# Patient Record
Sex: MALE | Race: BLACK OR AFRICAN AMERICAN | NOT HISPANIC OR LATINO | Employment: OTHER | URBAN - METROPOLITAN AREA
[De-identification: names, ages, dates, MRNs, and addresses within clinical notes are randomized per-mention and may not be internally consistent; named-entity substitution may affect disease eponyms.]

---

## 2017-01-04 ENCOUNTER — HOSPITAL ENCOUNTER (OUTPATIENT)
Dept: UROLOGY | Facility: HOSPITAL | Age: 67
Discharge: HOME | DRG: 699 | End: 2017-01-04
Attending: INTERNAL MEDICINE
Payer: MEDICARE

## 2017-01-04 VITALS
TEMPERATURE: 98 F | BODY MASS INDEX: 18.9 KG/M2 | WEIGHT: 135 LBS | RESPIRATION RATE: 18 BRPM | DIASTOLIC BLOOD PRESSURE: 80 MMHG | HEART RATE: 57 BPM | SYSTOLIC BLOOD PRESSURE: 118 MMHG | HEIGHT: 71 IN

## 2017-01-04 DIAGNOSIS — Z94.0 KIDNEY REPLACED BY TRANSPLANT: Primary | ICD-10-CM

## 2017-01-04 DIAGNOSIS — T86.10 COMPLICATIONS OF TRANSPLANTED KIDNEY: ICD-10-CM

## 2017-01-04 DIAGNOSIS — Z57.8 EMPLOYEE EXPOSURE TO BLOOD: Primary | ICD-10-CM

## 2017-01-04 DIAGNOSIS — Z72.89 OTHER PROBLEMS RELATED TO LIFESTYLE: ICD-10-CM

## 2017-01-04 DIAGNOSIS — R82.81 PYURIA: Primary | ICD-10-CM

## 2017-01-04 DIAGNOSIS — Z94.0 STATUS POST KIDNEY TRANSPLANT: ICD-10-CM

## 2017-01-04 DIAGNOSIS — R79.89 ELEVATED LFTS: ICD-10-CM

## 2017-01-04 LAB
BILIRUB SERPL-MCNC: NORMAL MG/DL
BLOOD URINE, POC: NORMAL
COLOR, POC UA: YELLOW
GLUCOSE UR QL STRIP: NORMAL
KETONES UR QL STRIP: NORMAL
LEUKOCYTE ESTERASE URINE, POC: NORMAL
NITRITE, POC UA: NORMAL
PH, POC UA: 5
PROTEIN, POC: NORMAL
SPECIFIC GRAVITY, POC UA: 1.01
UROBILINOGEN, POC UA: NORMAL

## 2017-01-04 PROCEDURE — 52310 CYSTOSCOPY AND TREATMENT: CPT | Mod: ,,, | Performed by: UROLOGY

## 2017-01-04 RX ORDER — TACROLIMUS 1 MG/1
CAPSULE ORAL
Qty: 450 CAPSULE | Refills: 11 | Status: ON HOLD | OUTPATIENT
Start: 2017-01-04 | End: 2017-01-16

## 2017-01-04 RX ORDER — LIDOCAINE HYDROCHLORIDE 20 MG/ML
10 JELLY TOPICAL
Status: COMPLETED | OUTPATIENT
Start: 2017-01-04 | End: 2017-01-04

## 2017-01-04 RX ORDER — SODIUM BICARBONATE 650 MG/1
1950 TABLET ORAL 2 TIMES DAILY
Qty: 180 TABLET | Refills: 11 | Status: SHIPPED | OUTPATIENT
Start: 2017-01-04 | End: 2017-09-25 | Stop reason: SDUPTHER

## 2017-01-04 RX ORDER — DOXYCYCLINE HYCLATE 100 MG
100 TABLET ORAL
Status: COMPLETED | OUTPATIENT
Start: 2017-01-04 | End: 2017-01-04

## 2017-01-04 RX ADMIN — Medication 100 MG: at 10:01

## 2017-01-04 RX ADMIN — LIDOCAINE HYDROCHLORIDE 10 ML: 20 JELLY TOPICAL at 09:01

## 2017-01-04 SDOH — SOCIAL DETERMINANTS OF HEALTH (SDOH): OCCUPATIONAL EXPOSURE TO OTHER RISK FACTORS: Z57.8

## 2017-01-04 NOTE — IP AVS SNAPSHOT
[address]  [place] [address]  Phone: [phone]           I have received a copy of my After Visit Summary and discharge instructions from [facility].    INSTRUCTIONS RECEIVED AND UNDERSTOOD BY:                     Patient/Patient Representative: ________________________________________________________________     Date/Time: ________________________________________________________________                     Instructions Given By: ________________________________________________________________     Date/Time: ________________________________________________________________

## 2017-01-04 NOTE — PROCEDURES
Procedure: Flexible cysto-uretheroscopy and stent removal   Pre Procedure Diagnosis:s/p kidney transplant   Post Procedure Diagnosis:same   Surgeon: Emily Guerin MD   Anesthesia: 2% uro-jet lidocaine jelly for local analgesia   Flexible cysto-urethroscopy was performed after consent was obtained. The risks and benefits were explained.   2% lidocaine urojet was used for local analgesia.   The genitalia was prepped and draped in the sterile fashion with betadine.   The flexible scope was advanced into the urethra and into the bladder. Slight stricture noted in urethra about 14F. Able to pass with scope with a little pressure.   The stent was removed without difficulty.   The patient tolerated the procedure well without complication.   They will follow up with transplant and f/u with Dr. Mann in 3 months.   Urine culture.

## 2017-01-04 NOTE — IP AVS SNAPSHOT
[facility]  [address] [facility] [address]  Phone: [phone]  Fax: [phone]                  Alin Burkett   2017  8:45 AM   Cysto and Stent Removal    Description:  Male : 1950   Provider:  [facility]   Department:  [facility]           Visit Information     Date & Time Provider Department    2017  8:45 AM [facility]      Recent Lab Values        10/20/2015 10/18/2016 2016 2016                  1:41 PM  3:12 PM  8:54 AM  8:26 AM        Color - - Yellow Yellow        Specific Gravity - - 1.015 1.010        pH - - 7.0 7.0        Nitrite - - Negative Negative        Ketones - - Negative Negative        Urobilinogen - - Negative Negative        PSA 0.32 0.41 - -        Comment for PSA at  1:41 PM on 10/20/2015:  PSA Expected levels:  Hormonal Therapy: <0.05 ng/ml  Prostatectomy: <0.01 ng/ml  Radiation Therapy: <1.00 ng/ml      Comment for PSA at  3:12 PM on 10/18/2016:  PSA Expected levels:  Hormonal Therapy: <0.05 ng/ml  Prostatectomy: <0.01 ng/ml  Radiation Therapy: <1.00 ng/ml        Procedures Performed This Visit     Procedure Authorizing Provider    Cystoscopy w/ stent removal Travis Zimmerman MD      Reason for Visit     Other           Diagnoses this Visit        Comments    Pyuria    -  Primary     Status post kidney transplant                To Do List           Your Scheduled Appointments     2017  8:00 AM CST   Fasting Lab with LAB, SLIDELL SAT   [facility] ([place]    [address]  [place] [address]   [phone]            2017  8:45 AM CST   Fasting Lab with LAB, SL[facility]LL SAT   [facility] ([place]    [address]  [place] [address]   [phone]            2017 11:00 AM CST   Established Patient with Chasidy Mcclain MD   [facility] ([facility] )    [address] [facility]  [address]   [phone]            Jan 23, 2017  8:30 AM CST   Fasting Lab with LAB, ENRIQUETA SAT   [facility] ([place])    [address] [place] [address] [phone]            Jan 23, 2017  8:30 AM CST   Urine with SPECIMEN, ENRIQUET[facility] [place] [facility] ([place])    [address]  [place] [address] [phone]              Goals (5 Years of Data)     None           Medications                ** Verify the list of medication(s) below is accurate and up to date. Carry this with you in case of emergency. If your medications have changed, please notify your healthcare provider.             Medication List      TAKE these medications        Additional Info                      cinacalcet 30 MG Tab   Commonly known as:  SENSIPAR   Quantity:  30 tablet   Refills:  11   Dose:  30 mg    Instructions:  Take 1 tablet (30 mg total) by mouth daily with breakfast.     Begin Date    AM    Noon    PM    Bedtime       famotidine 20 MG tablet   Commonly known as:  PEPCID   Quantity:  30 tablet   Refills:  11   Dose:  20 mg    Instructions:  Take 1 tablet (20 mg total) by mouth every evening.     Begin Date    AM    Noon    PM    Bedtime       k phos di & mono-sod phos mono 250 mg Tab   Commonly known as:  K-PHOS-NEUTRAL   Quantity:  180 tablet   Refills:  11   Dose:  500 mg   Indications:  Hypophosphatemia   Comments:  DOSE INCREASE    Instructions:  Take 2 tablets by mouth 3 (three) times daily.     Begin Date    AM    Noon    PM    Bedtime       levothyroxine 112 MCG tablet   Commonly known as:  SYNTHROID   Quantity:  30 tablet   Refills:  6   Dose:  112 mcg    Instructions:  Take 1 tablet (112 mcg total) by mouth once daily.     Begin Date    AM    Noon    PM    Bedtime       magnesium oxide 400 mg tablet   Commonly known as:  MAG-OX   Quantity:  30 tablet   Refills:  11   Dose:  400 mg    Instructions:  Take 1 tablet (400 mg total) by mouth once daily.     Begin Date    AM    Noon    PM     Bedtime       methylcellulose (laxative) 500 mg Tab   Commonly known as:  CITRUCEL   Quantity:  60 each   Refills:  6   Dose:  1 tablet    Instructions:  Take 1 tablet by mouth 2 (two) times daily.     Begin Date    AM    Noon    PM    Bedtime       metoprolol tartrate 25 MG tablet   Commonly known as:  LOPRESSOR   Quantity:  60 tablet   Refills:  11   Dose:  25 mg    Instructions:  Take 1 tablet (25 mg total) by mouth 2 (two) times daily.     Begin Date    AM    Noon    PM    Bedtime       mycophenolate 250 mg Cap   Commonly known as:  CELLCEPT   Quantity:  60 capsule   Refills:  11   Dose:  250 mg   Indications:  Prevention of Kidney Transplant Rejection   Comments:  DOSE DECREASE; Kidney Transplant z94.0; 11/26/16    Instructions:  Take 1 capsule (250 mg total) by mouth 2 (two) times daily. Z94.0/Kidney Transplant on 11/26/16     Begin Date    AM    Noon    PM    Bedtime       nifedipine 60 MG (OSM) 24 hr tablet   Commonly known as:  PROCARDIA-XL   Quantity:  60 tablet   Refills:  11   Dose:  60 mg    Instructions:  Take 1 tablet (60 mg total) by mouth 2 (two) times daily.     Begin Date    AM    Noon    PM    Bedtime       nystatin 100,000 unit/mL suspension   Commonly known as:  MYCOSTATIN   Quantity:  473 mL   Refills:  0   Dose:  [id number] Units    Instructions:  Take 5 mLs (500,000 Units total) by mouth 2 hours after meals and at bedtime. Stop: 12/26/16     Begin Date    AM    Noon    PM    Bedtime       ONE DAILY MULTIVITAMIN per tablet   Refills:  0   Dose:  1 tablet   Generic drug:  multivitamin    Instructions:  Take 1 tablet by mouth once daily.     Begin Date    AM    Noon    PM    Bedtime       oxycodone-acetaminophen 5-325 mg per tablet   Commonly known as:  PERCOCET   Quantity:  40 tablet   Refills:  0   Dose:  1 tablet    Instructions:  Take 1 tablet by mouth every 6 (six) hours as needed.     Begin Date    AM    Noon    PM    Bedtime       sodium bicarbonate 650 MG tablet   Quantity:  120 tablet  "  Refills:  11   Dose:  1300 mg   Comments:  DOSE DECREASE    Instructions:  Take 2 tablets (1,300 mg total) by mouth 2 (two) times daily.     Begin Date    AM    Noon    PM    Bedtime       sulfamethoxazole-trimethoprim 400-80mg 400-80 mg per tablet   Commonly known as:  BACTRIM,SEPTRA   Quantity:  30 tablet   Refills:  11   Dose:  1 tablet   Indications:  Opportunistic infection prophylaxis   Comments:  DOSE INCREASE; Kidney Transplant z94.0; 11/26/16    Instructions:  Take 1 tablet by mouth once daily. Stop: 11/26/17     Begin Date    AM    Noon    PM    Bedtime       tacrolimus 1 MG Cap   Commonly known as:  PROGRAF   Quantity:  420 capsule   Refills:  11   Dose:  7 mg   Indications:  Prevention of Kidney Transplant Rejection   Comments:  Z94.0/Kidney Transplant on 11/26/16    Instructions:  Take 7 capsules (7 mg total) by mouth every 12 (twelve) hours. Z94.0/Kidney Transplant on 11/26/16     Begin Date    AM    Noon    PM    Bedtime       valganciclovir 450 mg Tab   Commonly known as:  VALCYTE   Quantity:  30 tablet   Refills:  3   Dose:  450 mg   Comments:  DOSE INCREASE; Kidney Transplant z94.0; 11/26/16    Instructions:  Take 1 tablet (450 mg total) by mouth once daily. Stop: 2/24/17     Begin Date    AM    Noon    PM    Bedtime               Your Vitals Were     Temp Resp Height Weight BMI    97.8 °F (36.6 °C) (Oral) 18 5' 11" (1.803 m) 61.2 kg (135 lb) 18.83 kg/m2      Allergies as of 1/4/2017     No Known Allergies      Immunizations Administered on Date of Encounter - 1/4/2017     None      Orders Placed During Today's Visit      Normal Orders This Visit    CULTURE, URINE     Cystoscopy w/ stent removal     POCT urinalysis, dipstick or tablet reag       Maintenance Dialysis History     Start End Type Comments Center    6/16/2010  [facility] [place]            Current D[facility] Center Information     [facility] [place] [address] Phone #:  [phone]    Contact:  [facility] [place]  [address] Fax #:  " [phone]            Transplant Information        Txp Date Organ Coordinator Care Team    11/26/2016 Kidney Lisa Olea RN Referring Physician:  Lake Merchant MD   Surgeon:  Andrew Lopez Jr., MD         Instructions    What to Expect After a Cystoscopy with Stent Removal  For the next 24-48 hours, you may feel a mild burning when you urinate. This burning is normal and expected. Drink plenty of water to dilute the urine to help relieve the burning sensation. You may also see a small amount of blood in your urine after the procedure.    Unless you are already taking antibiotics, you may be given an antibiotic after the test to prevent infection.    Signs and Symptoms to Report  Call the [facility] at [phone] if you develop any of the following:  · Fever of 101 degrees or higher  · Chills or persistent bleeding  · Inability to urinate    After hours or on weekends, you may reach a urology resident on call at this number: [phone].       Advance Directives     An advance directive is a document which, in the event you are no longer able to make decisions for yourself, tells your healthcare team what kind of treatment you do or do not want to receive, or who you would like to make those decisions for you.  If you do not currently have an advance directive, [facility] encourages you to create one.  For more information call:  [phone]), [phone] ([phone]),  or log on to [email or web address].        [facility]     [facility] Care Line - 24/7 Assistance  Registered nurses in the [facility] provide clinical advisement, health education, appointment booking, and other advisory services.  Call for this free service at [phone].        Language Assistance Services     ATTENTION: Language assistance services are available, free of charge. Please call [phone].      ATENCIÓN: Si ramiro holley, aminah a neri disposición  servicios gratuitos de asistencia lingüística. Gilberto madison [phone].     [facility] Ý: N?u b?n nói Ti?ng Vi?t, có các d?ch v? h? tr? ngôn ng? mi?n phí dành cho b?n. G?i s? [phone].         [facility] complies with applicable Federal civil rights laws and does not discriminate on the basis of race, color, national origin, age, disability, or sex.

## 2017-01-04 NOTE — INTERVAL H&P NOTE
The patient has been examined and the H&P has been reviewed:    I concur with the findings and no changes have occurred since H&P was written.        There are no hospital problems to display for this patient.

## 2017-01-04 NOTE — PATIENT INSTRUCTIONS

## 2017-01-04 NOTE — H&P (VIEW-ONLY)
Transplant Surgery  Kidney Transplant Recipient Follow-up    Referring Physician: Lake Merchant  Current Nephrologist:     Subjective:     Chief Complaint: Alin Burkett is a 66 y.o. year old Black or  male who is status post Kidney transplant performed on 2016.    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  From a transplant perspective, he reports normal urination, no incisional pain, pain controlled with medications and no dysuria.  Alin is here for management of his immunosuppression medication.  Alin states that his immunosuppression is being well tolerated.  Hypertension is not present.    Review of Systems   Constitutional: Positive for unexpected weight change. Negative for activity change, appetite change, chills and fever.   HENT: Negative for congestion, nosebleeds, sinus pressure, sore throat and voice change.    Eyes: Negative for pain and visual disturbance.   Respiratory: Negative for cough and shortness of breath.    Cardiovascular: Negative for palpitations and leg swelling.   Gastrointestinal: Positive for diarrhea. Negative for abdominal distention, abdominal pain, nausea and vomiting.   Endocrine: Negative for cold intolerance and heat intolerance.   Genitourinary: Negative for dysuria, flank pain, frequency and hematuria.   Musculoskeletal: Negative for back pain and gait problem.   Skin: Negative for color change, pallor and wound.   Allergic/Immunologic: Negative for food allergies.   Neurological: Negative for dizziness, seizures, numbness and headaches.   Hematological: Negative for adenopathy.   Psychiatric/Behavioral: Negative for agitation, behavioral problems, hallucinations and sleep disturbance.        Objective:     Physical Exam   Constitutional: He is oriented to person, place, and time. He appears well-developed and well-nourished.   HENT:   Head: Normocephalic and atraumatic.   Eyes: EOM are normal. Pupils are equal, round, and reactive to light.   Neck: No JVD present.   Cardiovascular: Normal rate, regular rhythm and normal heart sounds.    Pulmonary/Chest: Effort normal and breath sounds normal. No stridor.   Abdominal: Soft. He exhibits no distension. There is no tenderness.   Incision c/d/i   Musculoskeletal: Normal range of motion. He exhibits no edema.   Neurological: He is alert and oriented to person, place, and time.   Skin: Skin is warm and dry.   Psychiatric: He has a normal mood and affect. His behavior is normal.     Lab Results   Component Value Date    CREATININE 1.3 12/27/2016    BUN 17 12/27/2016     Lab Results   Component Value Date    WBC 3.78 (L) 12/27/2016    HGB 12.1 (L) 12/27/2016    HCT 36.6 (L) 12/27/2016    HCT 26 (L) 11/26/2016     12/27/2016     Lab Results   Component Value Date    TACROLIMUS 5.3 12/27/2016         Assessment and Plan:        · S/P Kidney transplant.  · Chronic immunosuppressive medications for rejection prophylaxis subtherapeutic.  Plan: increase tacro to 7.  · Continue monitoring symptoms, labs and drug levels for drug-related toxicity and side effects.  · Renal hypertension at target.  · Diarrhea - imodium and fiber prescribed    Follow-up: Patient reminded to call with any health changes, since these can be early signs of significant complications.  Also, I advised the patient to be sure any new medications or changes of old medications are discussed with either a pharmacist, or physician knowledgeable with transplant to avoid rejection/drug toxicity related to significant drug interactions.    Andrew Lopez Jr, MD       [facility] Patient Status  Functional Status: 80% - Normal activity with effort: some symptoms of disease  Physical Capacity: No  Limitations

## 2017-01-04 NOTE — TELEPHONE ENCOUNTER
----- Message from Deb Gardner MD sent at 1/4/2017  3:33 PM CST -----  Prograf low at 6.8 --> if true 12 hour trough then increase Prograf from 7 mg BID to 8/7

## 2017-01-04 NOTE — TELEPHONE ENCOUNTER
Notified patient of Dr. Gardner's review of 1/3/17 lab results. Instructed patient to increase Prograf to 8/7 and increase sodium bicarbonate to 1950 mg BID. Informed patient that an abdominal ultrasound & transplant kidney ultrasound will be ordered due to elevated LFTs & elevated creatinine. Instructed patient to increase fluid intake to at least 2.5L water/fluids daily. Patient denies nausea, vomiting, diarrhea or any acute illness. Ultrasound and stat lab appointments given to patient.

## 2017-01-05 ENCOUNTER — HOSPITAL ENCOUNTER (INPATIENT)
Facility: HOSPITAL | Age: 67
LOS: 1 days | Discharge: HOME | DRG: 699 | End: 2017-01-06
Attending: TRANSPLANT SURGERY | Admitting: TRANSPLANT SURGERY
Payer: MEDICARE

## 2017-01-05 ENCOUNTER — HOSPITAL ENCOUNTER (OUTPATIENT)
Dept: RADIOLOGY | Facility: HOSPITAL | Age: 67
Discharge: HOME | DRG: 699 | End: 2017-01-05
Attending: INTERNAL MEDICINE
Payer: MEDICARE

## 2017-01-05 DIAGNOSIS — N17.9 AKI (ACUTE KIDNEY INJURY): ICD-10-CM

## 2017-01-05 DIAGNOSIS — Z29.89 PROPHYLACTIC IMMUNOTHERAPY: ICD-10-CM

## 2017-01-05 DIAGNOSIS — T86.10 COMPLICATIONS OF TRANSPLANTED KIDNEY: ICD-10-CM

## 2017-01-05 DIAGNOSIS — R79.89 ELEVATED LFTS: ICD-10-CM

## 2017-01-05 DIAGNOSIS — E87.20 ACIDOSIS: ICD-10-CM

## 2017-01-05 DIAGNOSIS — Z79.60 LONG-TERM USE OF IMMUNOSUPPRESSANT MEDICATION: ICD-10-CM

## 2017-01-05 DIAGNOSIS — Z94.0 S/P KIDNEY TRANSPLANT: ICD-10-CM

## 2017-01-05 LAB
ALBUMIN SERPL BCP-MCNC: 3.8 G/DL
ALP SERPL-CCNC: 110 U/L
ALT SERPL W/O P-5'-P-CCNC: 152 U/L
ANION GAP SERPL CALC-SCNC: 8 MMOL/L
AST SERPL-CCNC: 68 U/L
BACTERIA UR CULT: NO GROWTH
BASOPHILS # BLD AUTO: 0.03 K/UL
BASOPHILS NFR BLD: 0.8 %
BILIRUB SERPL-MCNC: 0.4 MG/DL
BUN SERPL-MCNC: 37 MG/DL
CALCIUM SERPL-MCNC: 8.8 MG/DL
CHLORIDE SERPL-SCNC: 111 MMOL/L
CO2 SERPL-SCNC: 22 MMOL/L
CREAT SERPL-MCNC: 1.6 MG/DL
DIFFERENTIAL METHOD: ABNORMAL
EOSINOPHIL # BLD AUTO: 0.2 K/UL
EOSINOPHIL NFR BLD: 4.7 %
ERYTHROCYTE [DISTWIDTH] IN BLOOD BY AUTOMATED COUNT: 16.6 %
EST. GFR  (AFRICAN AMERICAN): 51.5 ML/MIN/1.73 M^2
EST. GFR  (NON AFRICAN AMERICAN): 44.5 ML/MIN/1.73 M^2
GLUCOSE SERPL-MCNC: 88 MG/DL
HCT VFR BLD AUTO: 34.3 %
HGB BLD-MCNC: 11.6 G/DL
INR PPP: 0.9
LYMPHOCYTES # BLD AUTO: 0.5 K/UL
LYMPHOCYTES NFR BLD: 11.8 %
MAGNESIUM SERPL-MCNC: 2 MG/DL
MCH RBC QN AUTO: 30.6 PG
MCHC RBC AUTO-ENTMCNC: 33.8 %
MCV RBC AUTO: 91 FL
MONOCYTES # BLD AUTO: 0.2 K/UL
MONOCYTES NFR BLD: 5 %
NEUTROPHILS # BLD AUTO: 3 K/UL
NEUTROPHILS NFR BLD: 77.4 %
PHOSPHATE SERPL-MCNC: 2.8 MG/DL
PLATELET # BLD AUTO: 160 K/UL
PMV BLD AUTO: 10.1 FL
POCT GLUCOSE: 151 MG/DL (ref 70–110)
POTASSIUM SERPL-SCNC: 4.3 MMOL/L
PROT SERPL-MCNC: 7.3 G/DL
PROTHROMBIN TIME: 9.9 SEC
RBC # BLD AUTO: 3.79 M/UL
SODIUM SERPL-SCNC: 141 MMOL/L
WBC # BLD AUTO: 3.82 K/UL

## 2017-01-05 PROCEDURE — 63600175 PHARM REV CODE 636 W HCPCS: Performed by: PHYSICIAN ASSISTANT

## 2017-01-05 PROCEDURE — 83735 ASSAY OF MAGNESIUM: CPT

## 2017-01-05 PROCEDURE — 86977 RBC SERUM PRETX INCUBJ/INHIB: CPT

## 2017-01-05 PROCEDURE — 36415 COLL VENOUS BLD VENIPUNCTURE: CPT

## 2017-01-05 PROCEDURE — 86832 HLA CLASS I HIGH DEFIN QUAL: CPT | Mod: 91

## 2017-01-05 PROCEDURE — 25000003 PHARM REV CODE 250: Performed by: PHYSICIAN ASSISTANT

## 2017-01-05 PROCEDURE — 85025 COMPLETE CBC W/AUTO DIFF WBC: CPT

## 2017-01-05 PROCEDURE — 80053 COMPREHEN METABOLIC PANEL: CPT

## 2017-01-05 PROCEDURE — 20600001 HC STEP DOWN PRIVATE ROOM

## 2017-01-05 PROCEDURE — 84100 ASSAY OF PHOSPHORUS: CPT

## 2017-01-05 PROCEDURE — 86833 HLA CLASS II HIGH DEFIN QUAL: CPT

## 2017-01-05 PROCEDURE — 76776 US EXAM K TRANSPL W/DOPPLER: CPT | Mod: 26,,, | Performed by: RADIOLOGY

## 2017-01-05 PROCEDURE — 76700 US EXAM ABDOM COMPLETE: CPT | Mod: 26,,, | Performed by: RADIOLOGY

## 2017-01-05 PROCEDURE — 85610 PROTHROMBIN TIME: CPT

## 2017-01-05 PROCEDURE — 99223 1ST HOSP IP/OBS HIGH 75: CPT | Mod: 24,,, | Performed by: PHYSICIAN ASSISTANT

## 2017-01-05 RX ORDER — LEVOTHYROXINE SODIUM 112 UG/1
112 TABLET ORAL DAILY
Status: DISCONTINUED | OUTPATIENT
Start: 2017-01-06 | End: 2017-01-06 | Stop reason: HOSPADM

## 2017-01-05 RX ORDER — METOPROLOL TARTRATE 25 MG/1
25 TABLET, FILM COATED ORAL 2 TIMES DAILY
Status: DISCONTINUED | OUTPATIENT
Start: 2017-01-05 | End: 2017-01-06 | Stop reason: HOSPADM

## 2017-01-05 RX ORDER — HEPARIN SODIUM 5000 [USP'U]/ML
5000 INJECTION, SOLUTION INTRAVENOUS; SUBCUTANEOUS EVERY 8 HOURS
Status: DISCONTINUED | OUTPATIENT
Start: 2017-01-05 | End: 2017-01-06 | Stop reason: HOSPADM

## 2017-01-05 RX ORDER — OXYCODONE AND ACETAMINOPHEN 5; 325 MG/1; MG/1
1 TABLET ORAL EVERY 8 HOURS PRN
Status: DISCONTINUED | OUTPATIENT
Start: 2017-01-05 | End: 2017-01-06 | Stop reason: HOSPADM

## 2017-01-05 RX ORDER — IBUPROFEN 200 MG
16 TABLET ORAL
Status: DISCONTINUED | OUTPATIENT
Start: 2017-01-05 | End: 2017-01-06 | Stop reason: HOSPADM

## 2017-01-05 RX ORDER — NYSTATIN 100000 [USP'U]/ML
500000 SUSPENSION ORAL
Status: DISCONTINUED | OUTPATIENT
Start: 2017-01-05 | End: 2017-01-06 | Stop reason: HOSPADM

## 2017-01-05 RX ORDER — IBUPROFEN 200 MG
24 TABLET ORAL
Status: DISCONTINUED | OUTPATIENT
Start: 2017-01-05 | End: 2017-01-06 | Stop reason: HOSPADM

## 2017-01-05 RX ORDER — NIFEDIPINE 60 MG/1
60 TABLET, EXTENDED RELEASE ORAL 2 TIMES DAILY
Status: DISCONTINUED | OUTPATIENT
Start: 2017-01-05 | End: 2017-01-06 | Stop reason: HOSPADM

## 2017-01-05 RX ORDER — SULFAMETHOXAZOLE AND TRIMETHOPRIM 400; 80 MG/1; MG/1
1 TABLET ORAL DAILY
Status: DISCONTINUED | OUTPATIENT
Start: 2017-01-06 | End: 2017-01-06 | Stop reason: HOSPADM

## 2017-01-05 RX ORDER — CINACALCET 30 MG/1
30 TABLET, FILM COATED ORAL
Status: DISCONTINUED | OUTPATIENT
Start: 2017-01-06 | End: 2017-01-06 | Stop reason: HOSPADM

## 2017-01-05 RX ORDER — GLUCAGON 1 MG
1 KIT INJECTION
Status: DISCONTINUED | OUTPATIENT
Start: 2017-01-05 | End: 2017-01-06 | Stop reason: HOSPADM

## 2017-01-05 RX ORDER — SODIUM BICARBONATE 650 MG/1
1950 TABLET ORAL 2 TIMES DAILY
Status: DISCONTINUED | OUTPATIENT
Start: 2017-01-05 | End: 2017-01-06 | Stop reason: HOSPADM

## 2017-01-05 RX ORDER — FAMOTIDINE 20 MG/1
20 TABLET, FILM COATED ORAL NIGHTLY
Status: DISCONTINUED | OUTPATIENT
Start: 2017-01-05 | End: 2017-01-06 | Stop reason: HOSPADM

## 2017-01-05 RX ORDER — VALGANCICLOVIR 450 MG/1
450 TABLET, FILM COATED ORAL DAILY
Status: DISCONTINUED | OUTPATIENT
Start: 2017-01-06 | End: 2017-01-06 | Stop reason: HOSPADM

## 2017-01-05 RX ORDER — INSULIN ASPART 100 [IU]/ML
0-5 INJECTION, SOLUTION INTRAVENOUS; SUBCUTANEOUS
Status: DISCONTINUED | OUTPATIENT
Start: 2017-01-05 | End: 2017-01-06 | Stop reason: HOSPADM

## 2017-01-05 RX ORDER — SODIUM CHLORIDE 0.9 % (FLUSH) 0.9 %
3 SYRINGE (ML) INJECTION EVERY 8 HOURS
Status: DISCONTINUED | OUTPATIENT
Start: 2017-01-05 | End: 2017-01-06 | Stop reason: HOSPADM

## 2017-01-05 RX ORDER — ONDANSETRON 8 MG/1
8 TABLET, ORALLY DISINTEGRATING ORAL EVERY 8 HOURS PRN
Status: DISCONTINUED | OUTPATIENT
Start: 2017-01-05 | End: 2017-01-06 | Stop reason: HOSPADM

## 2017-01-05 RX ORDER — MYCOPHENOLATE MOFETIL 250 MG/1
250 CAPSULE ORAL 2 TIMES DAILY
Status: DISCONTINUED | OUTPATIENT
Start: 2017-01-05 | End: 2017-01-06

## 2017-01-05 RX ADMIN — DIBASIC SODIUM PHOSPHATE, MONOBASIC POTASSIUM PHOSPHATE AND MONOBASIC SODIUM PHOSPHATE 2 TABLET: 852; 155; 130 TABLET ORAL at 09:01

## 2017-01-05 RX ADMIN — HEPARIN SODIUM 5000 UNITS: 5000 INJECTION, SOLUTION INTRAVENOUS; SUBCUTANEOUS at 09:01

## 2017-01-05 RX ADMIN — NIFEDIPINE 60 MG: 60 TABLET, FILM COATED, EXTENDED RELEASE ORAL at 09:01

## 2017-01-05 RX ADMIN — FAMOTIDINE 20 MG: 20 TABLET, FILM COATED ORAL at 09:01

## 2017-01-05 RX ADMIN — MYCOPHENOLATE MOFETIL 250 MG: 250 CAPSULE ORAL at 09:01

## 2017-01-05 RX ADMIN — TACROLIMUS 7 MG: 5 CAPSULE, GELATIN COATED ORAL at 05:01

## 2017-01-05 RX ADMIN — SODIUM BICARBONATE 650 MG TABLET 1950 MG: at 09:01

## 2017-01-05 RX ADMIN — NYSTATIN 500000 UNITS: 500000 SUSPENSION ORAL at 09:01

## 2017-01-05 RX ADMIN — METOPROLOL TARTRATE 25 MG: 25 TABLET ORAL at 09:01

## 2017-01-05 RX ADMIN — DEXTROSE: 50 INJECTION, SOLUTION INTRAVENOUS at 07:01

## 2017-01-05 NOTE — TELEPHONE ENCOUNTER
Notified patient of Dr. Gardner's review of 1/4/17 lab results. Informed patient that he will need to be admitted to [facility] to be evaluated for elevated creatinine 2.6. Informed patient that he will need to report to admit office on 1st floor of hospital. Patient verbalized understanding.  MATTHEW Delarosa was informed of patient needing to be admitted today (Dr. Gardner's recommendations were given).

## 2017-01-05 NOTE — TELEPHONE ENCOUNTER
----- Message from Deb Gardner MD sent at 1/5/2017 12:33 PM CST -----  Cr even worse. Please direct patient for admission. He should have renal allograft biopsy, get DSAs, initiate empiric SM pulse.

## 2017-01-05 NOTE — IP AVS SNAPSHOT
[facility]  [address]  [facility] [address]  Phone: [phone]           Patient Discharge Instructions     Our goal is to set you up for success. This packet includes information on your condition, medications, and your home care. It will help you to care for yourself so you don't get sicker and need to go back to the hospital.     Please ask your nurse if you have any questions.        There are many details to remember when preparing to leave the hospital. Here is what you will need to do:    1. Take your medicine. If you are prescribed medications, review your Medication List in the following pages. You may have new medications to  at the pharmacy and others that you'll need to stop taking. Review the instructions for how and when to take your medications. Talk with your doctor or nurses if you are unsure of what to do.     2. Go to your follow-up appointments. Specific follow-up information is listed in the following pages. Your may be contacted by a transition nurse or clinical provider about future appointments. Be sure we have all of the phone numbers to reach you, if needed. Please contact your provider's office if you are unable to make an appointment.     3. Watch for warning signs. Your doctor or nurse will give you detailed warning signs to watch for and when to call for assistance. These instructions may also include educational information about your condition. If you experience any of warning signs to your health, call your doctor.               [facility]  Unless otherwise directed by your provider, please contact [facility], our nurse care line that is available for 24/7 assistance.     [phone] (toll-free)    Registered nurses in the [facility] provide clinical advisement, health education, appointment booking, and other advisory services.                    ** Verify the list of medication(s) below is accurate and up  to date. Carry this with you in case of emergency. If your medications have changed, please notify your healthcare provider.             Medication List      CHANGE how you take these medications        Additional Info                      mycophenolate 250 mg Cap   Commonly known as:  CELLCEPT   Quantity:  60 capsule   Refills:  11   Dose:  500 mg   Indications:  Prevention of Kidney Transplant Rejection   What changed:  how much to take   Comments:  DOSE DECREASE; Kidney Transplant z94.0; 11/26/16    Last time this was given:  250 mg on 1/6/2017  8:37 AM   Instructions:  Take 2 capsules (500 mg total) by mouth 2 (two) times daily. Z94.0/Kidney Transplant on 11/26/16     Begin Date    AM    Noon    PM    Bedtime         CONTINUE taking these medications        Additional Info                      cinacalcet 30 MG Tab   Commonly known as:  SENSIPAR   Quantity:  30 tablet   Refills:  11   Dose:  30 mg    Last time this was given:  30 mg on 1/6/2017  8:38 AM   Instructions:  Take 1 tablet (30 mg total) by mouth daily with breakfast.     Begin Date    AM    Noon    PM    Bedtime       famotidine 20 MG tablet   Commonly known as:  PEPCID   Quantity:  30 tablet   Refills:  11   Dose:  20 mg    Last time this was given:  20 mg on 1/5/2017  9:17 PM   Instructions:  Take 1 tablet (20 mg total) by mouth every evening.     Begin Date    AM    Noon    PM    Bedtime       k phos di & mono-sod phos mono 250 mg Tab   Commonly known as:  K-PHOS-NEUTRAL   Quantity:  180 tablet   Refills:  11   Dose:  500 mg   Indications:  Hypophosphatemia   Comments:  DOSE INCREASE    Last time this was given:  2 tablets on 1/6/2017  5:26 AM   Instructions:  Take 2 tablets by mouth 3 (three) times daily.     Begin Date    AM    Noon    PM    Bedtime       levothyroxine 112 MCG tablet   Commonly known as:  SYNTHROID   Quantity:  30 tablet   Refills:  6   Dose:  112 mcg    Last time this was given:  112 mcg on 1/6/2017  8:37 AM   Instructions:  Take  1 tablet (112 mcg total) by mouth once daily.     Begin Date    AM    Noon    PM    Bedtime       magnesium oxide 400 mg tablet   Commonly known as:  MAG-OX   Quantity:  30 tablet   Refills:  11   Dose:  400 mg    Instructions:  Take 1 tablet (400 mg total) by mouth once daily.     Begin Date    AM    Noon    PM    Bedtime       metoprolol tartrate 25 MG tablet   Commonly known as:  LOPRESSOR   Quantity:  60 tablet   Refills:  11   Dose:  25 mg    Last time this was given:  25 mg on 1/6/2017  8:39 AM   Instructions:  Take 1 tablet (25 mg total) by mouth 2 (two) times daily.     Begin Date    AM    Noon    PM    Bedtime       nifedipine 60 MG (OSM) 24 hr tablet   Commonly known as:  PROCARDIA-XL   Quantity:  60 tablet   Refills:  11   Dose:  60 mg    Last time this was given:  60 mg on 1/5/2017  9:17 PM   Instructions:  Take 1 tablet (60 mg total) by mouth 2 (two) times daily.     Begin Date    AM    Noon    PM    Bedtime       ONE DAILY MULTIVITAMIN per tablet   Refills:  0   Dose:  1 tablet   Generic drug:  multivitamin    Instructions:  Take 1 tablet by mouth once daily.     Begin Date    AM    Noon    PM    Bedtime       oxycodone-acetaminophen 5-325 mg per tablet   Commonly known as:  PERCOCET   Quantity:  40 tablet   Refills:  0   Dose:  1 tablet    Instructions:  Take 1 tablet by mouth every 6 (six) hours as needed.     Begin Date    AM    Noon    PM    Bedtime       sodium bicarbonate 650 MG tablet   Quantity:  180 tablet   Refills:  11   Dose:  1950 mg   Comments:  DOSE INCREASE    Last time this was given:  1,950 mg on 1/6/2017  8:37 AM   Instructions:  Take 3 tablets (1,950 mg total) by mouth 2 (two) times daily.     Begin Date    AM    Noon    PM    Bedtime       sulfamethoxazole-trimethoprim 400-80mg 400-80 mg per tablet   Commonly known as:  BACTRIM,SEPTRA   Quantity:  30 tablet   Refills:  11   Dose:  1 tablet   Indications:  Opportunistic infection prophylaxis   Comments:  DOSE INCREASE; Kidney  Transplant z94.0; 11/26/16    Last time this was given:  1 tablet on 1/6/2017  8:39 AM   Instructions:  Take 1 tablet by mouth once daily. Stop: 11/26/17     Begin Date    AM    Noon    PM    Bedtime       tacrolimus 1 MG Cap   Commonly known as:  PROGRAF   Quantity:  450 capsule   Refills:  11   Indications:  Prevention of Kidney Transplant Rejection   Comments:  DOSE INCREASE; Z94.0/Kidney Transplant on 11/26/16    Last time this was given:  8 mg on 1/6/2017  8:38 AM   Instructions:  Take 8 capsules (8mg total) in AM & 7 capsules (7mg total) in PM by mouth daily. Z94.0/Kidney Transplant on 11/26/16     Begin Date    AM    Noon    PM    Bedtime       valganciclovir 450 mg Tab   Commonly known as:  VALCYTE   Quantity:  30 tablet   Refills:  3   Dose:  450 mg   Comments:  DOSE INCREASE; Kidney Transplant z94.0; 11/26/16    Last time this was given:  450 mg on 1/6/2017  8:37 AM   Instructions:  Take 1 tablet (450 mg total) by mouth once daily. Stop: 2/24/17     Begin Date    AM    Noon    PM    Bedtime            Where to Get Your Medications      Information about where to get these medications is not yet available     ! Ask your nurse or doctor about these medications     mycophenolate 250 mg Cap                  Please bring to all follow up appointments:    1. A copy of your discharge instructions.  2. All medicines you are currently taking in their original bottles.  3. Identification and insurance card.    Please arrive 15 minutes ahead of scheduled appointment time.    Please call 24 hours in advance if you must reschedule your appointment and/or time.        Your Scheduled Appointments     Jan 09, 2017  8:00 AM CST   Fasting Lab with [facility] ([place])    [address]  [place] [address]   [phone]            Jan 16, 2017  8:45 AM CST   Fasting Lab with [facility] - Lab ([place])    [address]  [place] [address]   [phone]             Jan 16, 2017 11:00 AM CST   Established Patient with MD Brad Machado [facility]- Transplant ([place] )    [address]  [facility] [id number] [phone]            Jan 23, 2017  8:30 AM CST   Fasting Lab with [facility], [facility] - Lab ([place])    [address]  [place] [address] [phone]            Jan 23, 2017  8:30 AM CST   Urine with SPECIMEN, SLIDEMILVIA   [place] [facility] - Lab ([place])    [address]  [place] [address] [phone]              Follow-up Information     Follow up with as scheduled per transplant coordinator.    Why:  As needed, If symptoms worsen as scheduled per transplant coordinator        Discharge Instructions     Future Orders    Call MD for:  difficulty breathing or increased cough     Call MD for:  increased confusion or weakness     Call MD for:  persistent dizziness, light-headedness, or visual disturbances     Call MD for:  persistent nausea and vomiting or diarrhea     Call MD for:  redness, tenderness, or signs of infection (pain, swelling, redness, odor or green/yellow discharge around incision site)     Call MD for:  severe persistent headache     Call MD for:  severe uncontrolled pain     Call MD for:  temperature >100.4     Call MD for:  worsening rash     Call MD for:     Comments:    For any concerning signs or symptoms    Diet general     Questions:    Total calories:      Fat restriction, if any:      Protein restriction, if any:      Na restriction, if any:      Fluid restriction:      Additional restrictions:      Lifting restrictions     Comments:    Do not lift >10 pounds for 6 weeks    No dressing needed         Primary Diagnosis     Your primary diagnosis was:  Acute Nontraumatic Kidney Injury      Admission Information     Date & Time Provider Department CSN    1/5/2017  4:46 PM Andrew Lopez Jr., MD [facility] [id number]      Care Providers     Provider Role Specialty Primary  "office phone    Andrew Lopez Jr., MD Attending Provider Transplant [phone]    Reinier Roche MD Physician  Transplant [phone]    Lake Kay MD Physician  Transplant [phone]    Davida Roberto MD Physician  Transplant [phone]    Goldy Cruz MD Physician  Transplant [phone]    Naeem Cordova MD Physician  Transplant [phone]    Kyle Rouse Jr., MD Physician  Transplant [phone]    Vincenzo Austin MD Physician  Transplant [phone]    Andrew Lopez Jr., MD Physician  Transplant [phone]    Jovanna Omalley MD Consulting Physician  Nephrology [phone]    Travis Zimmerman MD Consulting Physician  Nephrology [phone]    Deb Gardner MD Consulting Physician  Transplant [phone]      Your Vitals Were     BP Pulse Temp Resp Height Weight    123/83 (BP Location: Right arm, Patient Position: Sitting, BP Method: Automatic) 76 98.3 °F (36.8 °C) (Oral) 18 5' 11" (1.803 m) 60 kg (132 lb 3.2 oz)    SpO2 BMI             99% 18.44 kg/m2         Recent Lab Values        2/8/2012                           5:05 AM           A1C 5.2                       Pending Labs     Order Current Status    HLA Donor Specific Antibodies In process    Urine culture In process      Allergies as of 1/6/2017     No Known Allergies      Advance Directives     An advance directive is a document which, in the event you are no longer able to make decisions for yourself, tells your healthcare team what kind of treatment you do or do not want to receive, or who you would like to make those decisions for you.  If you do not currently have an advance directive, [facility] encourages you to create one.  For more information call:  [phone] ([phone]), [phone] ([phone]),  or log on to [email or web address].        Smoking Cessation     If you would like to quit smoking:   You may be eligible for free services if you are a [place] resident and started " smoking cigarettes before September 1, 1988.  Call the Smoking Cessation [employer] (SCT) toll free at (096) 318-5342 or (700) 584-3412.   Call 1-800-QUIT-NOW if you do not meet the above criteria.            Language Assistance Services     ATTENTION: Language assistance services are available, free of charge. Please call [phone].      ATENCIÓN: Si habla español, tiene a neri disposición servicios gratuitos de asistencia lingüística. Llame al [phone].     CHÚ Ý: N?u b?n nói Ti?ng Vi?t, có các d?ch v? h? tr? ngôn ng? mi?n phí dành cho b?n. G?i s? [phone].        Chronic Kindey Disease Education              [facility] complies with applicable Federal civil rights laws and does not discriminate on the basis of race, color, national origin, age, disability, or sex.

## 2017-01-05 NOTE — IP AVS SNAPSHOT
[address]  [place] [address]  Phone: [phone]           I have received a copy of my After Visit Summary and discharge instructions from [facility].    INSTRUCTIONS RECEIVED AND UNDERSTOOD BY:                     Patient/Patient Representative: ________________________________________________________________     Date/Time: ________________________________________________________________                     Instructions Given By: ________________________________________________________________     Date/Time: ________________________________________________________________

## 2017-01-06 VITALS
OXYGEN SATURATION: 100 % | BODY MASS INDEX: 18.51 KG/M2 | HEIGHT: 71 IN | TEMPERATURE: 98 F | SYSTOLIC BLOOD PRESSURE: 115 MMHG | HEART RATE: 70 BPM | DIASTOLIC BLOOD PRESSURE: 78 MMHG | RESPIRATION RATE: 18 BRPM | WEIGHT: 132.19 LBS

## 2017-01-06 DIAGNOSIS — R74.8 ELEVATED LIVER ENZYMES: Primary | ICD-10-CM

## 2017-01-06 PROBLEM — N17.9 AKI (ACUTE KIDNEY INJURY): Status: ACTIVE | Noted: 2017-01-06

## 2017-01-06 LAB
ALBUMIN SERPL BCP-MCNC: 3.6 G/DL
ALP SERPL-CCNC: 101 U/L
ALT SERPL W/O P-5'-P-CCNC: 128 U/L
ANION GAP SERPL CALC-SCNC: 10 MMOL/L
AST SERPL-CCNC: 47 U/L
BASOPHILS # BLD AUTO: 0.01 K/UL
BASOPHILS NFR BLD: 0.2 %
BILIRUB SERPL-MCNC: 0.4 MG/DL
BILIRUB UR QL STRIP: NEGATIVE
BUN SERPL-MCNC: 32 MG/DL
CALCIUM SERPL-MCNC: 9.1 MG/DL
CHLORIDE SERPL-SCNC: 110 MMOL/L
CLARITY UR REFRACT.AUTO: CLEAR
CO2 SERPL-SCNC: 19 MMOL/L
COLOR UR AUTO: YELLOW
CREAT SERPL-MCNC: 1.5 MG/DL
DIFFERENTIAL METHOD: ABNORMAL
EOSINOPHIL # BLD AUTO: 0 K/UL
EOSINOPHIL NFR BLD: 0.5 %
ERYTHROCYTE [DISTWIDTH] IN BLOOD BY AUTOMATED COUNT: 16.4 %
EST. GFR  (AFRICAN AMERICAN): 55.7 ML/MIN/1.73 M^2
EST. GFR  (NON AFRICAN AMERICAN): 48.2 ML/MIN/1.73 M^2
GLUCOSE SERPL-MCNC: 133 MG/DL
GLUCOSE UR QL STRIP: NEGATIVE
HCT VFR BLD AUTO: 34.6 %
HGB BLD-MCNC: 11.8 G/DL
HGB UR QL STRIP: NEGATIVE
KETONES UR QL STRIP: NEGATIVE
LEUKOCYTE ESTERASE UR QL STRIP: NEGATIVE
LYMPHOCYTES # BLD AUTO: 0.4 K/UL
LYMPHOCYTES NFR BLD: 10.9 %
MAGNESIUM SERPL-MCNC: 2.1 MG/DL
MCH RBC QN AUTO: 30.9 PG
MCHC RBC AUTO-ENTMCNC: 34.1 %
MCV RBC AUTO: 91 FL
MONOCYTES # BLD AUTO: 0.1 K/UL
MONOCYTES NFR BLD: 1.7 %
NEUTROPHILS # BLD AUTO: 3.5 K/UL
NEUTROPHILS NFR BLD: 86 %
NITRITE UR QL STRIP: NEGATIVE
PH UR STRIP: 7 [PH] (ref 5–8)
PHOSPHATE SERPL-MCNC: 2.7 MG/DL
PLATELET # BLD AUTO: 157 K/UL
PMV BLD AUTO: 10.5 FL
POCT GLUCOSE: 131 MG/DL (ref 70–110)
POTASSIUM SERPL-SCNC: 4.7 MMOL/L
PROT SERPL-MCNC: 7.1 G/DL
PROT UR QL STRIP: NEGATIVE
RBC # BLD AUTO: 3.82 M/UL
SODIUM SERPL-SCNC: 139 MMOL/L
SP GR UR STRIP: 1.01 (ref 1–1.03)
TACROLIMUS BLD-MCNC: 8 NG/ML
URN SPEC COLLECT METH UR: NORMAL
UROBILINOGEN UR STRIP-ACNC: NEGATIVE EU/DL
WBC # BLD AUTO: 4.03 K/UL

## 2017-01-06 PROCEDURE — 80197 ASSAY OF TACROLIMUS: CPT

## 2017-01-06 PROCEDURE — 63600175 PHARM REV CODE 636 W HCPCS: Performed by: RADIOLOGY

## 2017-01-06 PROCEDURE — 85025 COMPLETE CBC W/AUTO DIFF WBC: CPT

## 2017-01-06 PROCEDURE — 84100 ASSAY OF PHOSPHORUS: CPT

## 2017-01-06 PROCEDURE — 63600175 PHARM REV CODE 636 W HCPCS: Performed by: PHYSICIAN ASSISTANT

## 2017-01-06 PROCEDURE — 25000003 PHARM REV CODE 250: Performed by: RADIOLOGY

## 2017-01-06 PROCEDURE — 80053 COMPREHEN METABOLIC PANEL: CPT

## 2017-01-06 PROCEDURE — 83735 ASSAY OF MAGNESIUM: CPT

## 2017-01-06 PROCEDURE — 36415 COLL VENOUS BLD VENIPUNCTURE: CPT

## 2017-01-06 PROCEDURE — 81003 URINALYSIS AUTO W/O SCOPE: CPT

## 2017-01-06 PROCEDURE — 99238 HOSP IP/OBS DSCHRG MGMT 30/<: CPT | Mod: 24,,, | Performed by: PHYSICIAN ASSISTANT

## 2017-01-06 PROCEDURE — 87086 URINE CULTURE/COLONY COUNT: CPT

## 2017-01-06 PROCEDURE — 0TB03ZX EXCISION OF RIGHT KIDNEY, PERCUTANEOUS APPROACH, DIAGNOSTIC: ICD-10-PCS | Performed by: RADIOLOGY

## 2017-01-06 PROCEDURE — 25000003 PHARM REV CODE 250: Performed by: PHYSICIAN ASSISTANT

## 2017-01-06 RX ORDER — MYCOPHENOLATE MOFETIL 250 MG/1
500 CAPSULE ORAL 2 TIMES DAILY
Status: DISCONTINUED | OUTPATIENT
Start: 2017-01-06 | End: 2017-01-06 | Stop reason: HOSPADM

## 2017-01-06 RX ORDER — MYCOPHENOLATE MOFETIL 250 MG/1
500 CAPSULE ORAL 2 TIMES DAILY
Qty: 60 CAPSULE | Refills: 11 | Status: ON HOLD
Start: 2017-01-06 | End: 2017-01-16

## 2017-01-06 RX ADMIN — SULFAMETHOXAZOLE AND TRIMETHOPRIM 1 TABLET: 400; 80 TABLET ORAL at 08:01

## 2017-01-06 RX ADMIN — DESMOPRESSIN ACETATE 9 MCG: 4 INJECTION INTRAVENOUS at 09:01

## 2017-01-06 RX ADMIN — VALGANCICLOVIR 450 MG: 450 TABLET, FILM COATED ORAL at 08:01

## 2017-01-06 RX ADMIN — DIBASIC SODIUM PHOSPHATE, MONOBASIC POTASSIUM PHOSPHATE AND MONOBASIC SODIUM PHOSPHATE 2 TABLET: 852; 155; 130 TABLET ORAL at 05:01

## 2017-01-06 RX ADMIN — METOPROLOL TARTRATE 25 MG: 25 TABLET ORAL at 08:01

## 2017-01-06 RX ADMIN — MYCOPHENOLATE MOFETIL 250 MG: 250 CAPSULE ORAL at 08:01

## 2017-01-06 RX ADMIN — CINACALCET HYDROCHLORIDE 30 MG: 30 TABLET, COATED ORAL at 08:01

## 2017-01-06 RX ADMIN — SODIUM BICARBONATE 650 MG TABLET 1950 MG: at 08:01

## 2017-01-06 RX ADMIN — TACROLIMUS 8 MG: 5 CAPSULE, GELATIN COATED ORAL at 08:01

## 2017-01-06 RX ADMIN — LEVOTHYROXINE SODIUM 112 MCG: 112 TABLET ORAL at 08:01

## 2017-01-06 RX ADMIN — THERA TABS 1 TABLET: TAB at 08:01

## 2017-01-06 NOTE — PROGRESS NOTES
Discharge Medication Note:    Hospital Course:  65 y/o M s/p  Kidney Transplant 11/26/16 secondary to HTN.  Patient admitted for possible rejection with DIVINE (SCr 2.6 from baseline of 1.3).  On admit SCr was 1.5.  Patient empirically received 1 dose of solumedrol and a kidney biopsy on 1/6. Plan was to discontinue solumedrol and increase MMF back to dose of 500 mg PO BID.  Will get a G6PD in the case of switching bactrim to Dapsone due to neutropenia and mild increases in LFTs.  Hepatology was curbsided for elevated LFTs -- patient to follow up with hepatology outpatient in 3-4 weeks.  Otherwise patient doing well and understood all the changes made during this hospitalization.     Met with Alin Burkett at discharge to review discharge medications and to update the blue medication card.      Medication Information      cinacalcet (SENSIPAR) 30 MG Tab  Take 1 tablet (30 mg total) by mouth daily with breakfast.     famotidine (PEPCID) 20 MG tablet  Take 1 tablet (20 mg total) by mouth every evening.     k phos di & mono-sod phos mono (K-PHOS-NEUTRAL) 250 mg Tab  Take 2 tablets by mouth 3 (three) times daily.     levothyroxine (SYNTHROID) 112 MCG tablet  Take 1 tablet (112 mcg total) by mouth once daily.     magnesium oxide (MAG-OX) 400 mg tablet  Take 1 tablet (400 mg total) by mouth once daily.     metoprolol tartrate (LOPRESSOR) 25 MG tablet  Take 1 tablet (25 mg total) by mouth 2 (two) times daily.     multivitamin (ONE DAILY MULTIVITAMIN) per tablet  Take 1 tablet by mouth once daily.     mycophenolate (CELLCEPT) 250 mg Cap  Take 2 capsules (500 mg total) by mouth 2 (two) times daily. Z94.0/Kidney Transplant on 11/26/16     nifedipine (PROCARDIA-XL) 60 MG (OSM) 24 hr tablet  Take 1 tablet (60 mg total) by mouth 2 (two) times daily.     oxycodone-acetaminophen (PERCOCET) 5-325 mg per tablet  Take 1 tablet by mouth every 6 (six) hours as needed.     sodium bicarbonate 650 MG tablet  Take 3 tablets (1,950 mg total)  by mouth 2 (two) times daily.     sulfamethoxazole-trimethoprim 400-80mg (BACTRIM,SEPTRA) 400-80 mg per tablet  Take 1 tablet by mouth once daily. Stop: 11/26/17     tacrolimus (PROGRAF) 1 MG Cap  Take 8 capsules (8mg total) in AM & 7 capsules (7mg total) in PM by mouth daily. Z94.0/Kidney Transplant on 11/26/16     valganciclovir (VALCYTE) 450 mg Tab  Take 1 tablet (450 mg total) by mouth once daily. Stop: 2/24/17         Pt was provided with prescriptions for the following meds:  E-rx: n/a  Printed rx: n/a  Phone-in or faxed rx: n/a to n/a pharmacy per pt request.    The following medications have been placed on HOLD and should be restarted in the outpatient setting (when appropriate): n/a    Alin Burkett verbalized understanding and had the opportunity to ask questions.

## 2017-01-06 NOTE — PLAN OF CARE
Problem: Patient Care Overview  Goal: Plan of Care Review  Outcome: Ongoing (interventions implemented as appropriate)  Patient aaox4. Bed kept locked, lowest position with 2x side rails up while in bed. nonslip footwear when out of bed. Ambulates independently. Call bell and personal items within reach. NPO after MD. Went for kidney u/s with biopsy this morning, bandaide to right abdomen cdi.  Left arm fistula +/+. Right FA PIV cdi, removed per discharge order, catheter intact. All discharge instructions explained and questions answered. Discharged home.

## 2017-01-06 NOTE — PLAN OF CARE
Problem: Patient Care Overview  Goal: Plan of Care Review  Outcome: Ongoing (interventions implemented as appropriate)  Pt has call bell in reach, non slip socks on, and bedrails up x2. Pt encouraged to wash hands. Pt receiving iv steroids for rejection.

## 2017-01-06 NOTE — DISCHARGE SUMMARY
Discharge Summary      Admit Date: 1/5/2017    Discharge Date and Time: 01/06/2017    Attending Physician: Andrew Lopez Jr., MD     Discharge Physician: Andrew Lopez Jr., MD     Principal Diagnoses: DIVINE (acute kidney injury)    Other Secondary Diagnoses:   1. DIVINE (acute kidney injury)    2. Long-term use of immunosuppressant medication    3. Prophylactic immunotherapy    4. S/P kidney transplant        Discharged Condition: good    Indication for Admission: DIVINE (acute kidney injury) [N17.9]     Hospital Course: Mr. Mi is a 66 y.o. M with ESRD secondary to HTN and congenitally absent L kidney. He was on hemodialysis since 06/16/2010. He is now s/p DDKTX (DBD donor, KDPI:36%) on 11/26/16. On prograf and cellcept increased to 500 BID. Of significance, donor had positive RPR screening testing. RPR checked on pt, negative. Oral doxycycline was started emperically on 11/27, and he was transitioned to 2.4 mu IM PCN with weekly injections x 3 weeks 12/5, 12/12, 12/19. RPR to be rechecked at 1, 3, 6, 9, and 12 months to exclude transmission per ID.     Pt was admitted 1/5 for DIVINE. Cr 2.6 from baseline 1.3. US 1/5 without hydronephrosis, stable RIs. Renal vein stenosis (mid vein not at anastomosis) noted on US, reviewed by Dr. Lopez. Pt received 1 dose of solumedrol empirically on admit; however, Cr improved to 1.6 prior to receiving first dose, and was down to 1.5 this morning. Pt underwent US biopsy of kidney 1/6 results pending. DSAs pending.    Pt also found to have elevated AST/ALT on outpt labs, which continued to be elevated while inpt. US liver 1/5 without significant findings. Will need to continue with CMPs with routine labs. Consider bactrim as possible cause, will send G6PD with follow up labs. Discussed briefly with Dr. Khan, pt is to follow up with hepatology in 3-4 weeks outpt.     Pt is doing well this morning. He is ready for discharge and will need labs (to include: CMP, Hep B DNA  PCR, Hep B surface antigen, Hep C RNA PCR, HIV-1 and HIV-2 antibodies and G6PD) as well as repeat US of his kidney Mon.           Total time spent with patients discussing/planning discharge 23 min.     Consults: none    Significant Diagnostic Studies: labs:   Lab Results   Component Value Date    WBC 4.03 01/06/2017    HGB 11.8 (L) 01/06/2017    HCT 34.6 (L) 01/06/2017    MCV 91 01/06/2017     01/06/2017     CMP  Sodium   Date Value Ref Range Status   01/06/2017 139 136 - 145 mmol/L Final     Potassium   Date Value Ref Range Status   01/06/2017 4.7 3.5 - 5.1 mmol/L Final     Chloride   Date Value Ref Range Status   01/06/2017 110 95 - 110 mmol/L Final     CO2   Date Value Ref Range Status   01/06/2017 19 (L) 23 - 29 mmol/L Final     Glucose   Date Value Ref Range Status   01/06/2017 133 (H) 70 - 110 mg/dL Final     BUN, Bld   Date Value Ref Range Status   01/06/2017 32 (H) 8 - 23 mg/dL Final     Creatinine   Date Value Ref Range Status   01/06/2017 1.5 (H) 0.5 - 1.4 mg/dL Final     Calcium   Date Value Ref Range Status   01/06/2017 9.1 8.7 - 10.5 mg/dL Final     Total Protein   Date Value Ref Range Status   01/06/2017 7.1 6.0 - 8.4 g/dL Final     Albumin   Date Value Ref Range Status   01/06/2017 3.6 3.5 - 5.2 g/dL Final     Total Bilirubin   Date Value Ref Range Status   01/06/2017 0.4 0.1 - 1.0 mg/dL Final     Comment:     For infants and newborns, interpretation of results should be based  on gestational age, weight and in agreement with clinical  observations.  Premature Infant recommended reference ranges:  Up to 24 hours.............<8.0 mg/dL  Up to 48 hours............<12.0 mg/dL  3-5 days..................<15.0 mg/dL  6-29 days.................<15.0 mg/dL       Alkaline Phosphatase   Date Value Ref Range Status   01/06/2017 101 55 - 135 U/L Final     AST   Date Value Ref Range Status   01/06/2017 47 (H) 10 - 40 U/L Final     ALT   Date Value Ref Range Status   01/06/2017 128 (H) 10 - 44 U/L Final      Anion Gap   Date Value Ref Range Status   01/06/2017 10 8 - 16 mmol/L Final     eGFR if    Date Value Ref Range Status   01/06/2017 55.7 (A) >60 mL/min/1.73 m^2 Final     eGFR if non    Date Value Ref Range Status   01/06/2017 48.2 (A) >60 mL/min/1.73 m^2 Final     Comment:     Calculation used to obtain the estimated glomerular filtration  rate (eGFR) is the CKD-EPI equation. Since race is unknown   in our information system, the eGFR values for   -American and Non--American patients are given   for each creatinine result.       Tacrolimus Lvl   Date Value Ref Range Status   01/06/2017 8.0 5.0 - 15.0 ng/mL Final     Comment:     Testing performed by Liquid Chromatography-Tandem  Mass Spectrometry (LC-MS/MS).  This test was developed and its performance characteristics  determined by [facility], Department of Pathology  and Laboratory Medicine in a manner consistent with CLIA  requirements. It has not been cleared or approved by the [place]  Food and Drug Administration.  This test is used for clinical   purposes.  It should not be regarded as investigational or for  research.     01/03/2017 6.8 5.0 - 15.0 ng/mL Final     Comment:     Testing performed by Liquid Chromatography-Tandem  Mass Spectrometry (LC-MS/MS).  This test was developed and its performance characteristics  determined by [facility], Department of Pathology  and Laboratory Medicine in a manner consistent with CLIA  requirements. It has not been cleared or approved by the [place]  Food and Drug Administration.  This test is used for clinical   purposes.  It should not be regarded as investigational or for  research.     12/27/2016 5.3 5.0 - 15.0 ng/mL Final     Comment:     Testing performed by Liquid Chromatography-Tandem  Mass Spectrometry (LC-MS/MS).  This test was developed and its performance characteristics  determined by [facility], Department of Pathology  and  Laboratory Medicine in a manner consistent with CLIA  requirements. It has not been cleared or approved by the [place]  Food and Drug Administration.  This test is used for clinical   purposes.  It should not be regarded as investigational or for  research.     12/22/2016 9.5 5.0 - 15.0 ng/mL Final     Comment:     Testing performed by Liquid Chromatography-Tandem  Mass Spectrometry (LC-MS/MS).  This test was developed and its performance characteristics  determined by [facility], Department of Pathology  and Laboratory Medicine in a manner consistent with CLIA  requirements. It has not been cleared or approved by the [place]  Food and Drug Administration.  This test is used for clinical   purposes.  It should not be regarded as investigational or for  research.           Treatments: See hospital course above    Disposition: Home or Self Care    Patient Instructions:   Current Discharge Medication List      CONTINUE these medications which have CHANGED    Details   mycophenolate (CELLCEPT) 250 mg Cap Take 2 capsules (500 mg total) by mouth 2 (two) times daily. Z94.0/Kidney Transplant on 11/26/16  Qty: 60 capsule, Refills: 11    Comments: DOSE DECREASE; Kidney Transplant z94.0; 11/26/16         CONTINUE these medications which have NOT CHANGED    Details   cinacalcet (SENSIPAR) 30 MG Tab Take 1 tablet (30 mg total) by mouth daily with breakfast.  Qty: 30 tablet, Refills: 11      famotidine (PEPCID) 20 MG tablet Take 1 tablet (20 mg total) by mouth every evening.  Qty: 30 tablet, Refills: 11      k phos di & mono-sod phos mono (K-PHOS-NEUTRAL) 250 mg Tab Take 2 tablets by mouth 3 (three) times daily.  Qty: 180 tablet, Refills: 11    Comments: DOSE INCREASE      levothyroxine (SYNTHROID) 112 MCG tablet Take 1 tablet (112 mcg total) by mouth once daily.  Qty: 30 tablet, Refills: 6      magnesium oxide (MAG-OX) 400 mg tablet Take 1 tablet (400 mg total) by mouth once daily.  Qty: 30 tablet, Refills: 11      metoprolol  tartrate (LOPRESSOR) 25 MG tablet Take 1 tablet (25 mg total) by mouth 2 (two) times daily.  Qty: 60 tablet, Refills: 11      multivitamin (ONE DAILY MULTIVITAMIN) per tablet Take 1 tablet by mouth once daily.      nifedipine (PROCARDIA-XL) 60 MG (OSM) 24 hr tablet Take 1 tablet (60 mg total) by mouth 2 (two) times daily.  Qty: 60 tablet, Refills: 11      oxycodone-acetaminophen (PERCOCET) 5-325 mg per tablet Take 1 tablet by mouth every 6 (six) hours as needed.  Qty: 40 tablet, Refills: 0      sodium bicarbonate 650 MG tablet Take 3 tablets (1,950 mg total) by mouth 2 (two) times daily.  Qty: 180 tablet, Refills: 11    Comments: DOSE INCREASE      sulfamethoxazole-trimethoprim 400-80mg (BACTRIM,SEPTRA) 400-80 mg per tablet Take 1 tablet by mouth once daily. Stop: 11/26/17  Qty: 30 tablet, Refills: 11    Comments: DOSE INCREASE; Kidney Transplant z94.0; 11/26/16      tacrolimus (PROGRAF) 1 MG Cap Take 8 capsules (8mg total) in AM & 7 capsules (7mg total) in PM by mouth daily. Z94.0/Kidney Transplant on 11/26/16  Qty: 450 capsule, Refills: 11    Comments: DOSE INCREASE; Z94.0/Kidney Transplant on 11/26/16      valganciclovir (VALCYTE) 450 mg Tab Take 1 tablet (450 mg total) by mouth once daily. Stop: 2/24/17  Qty: 30 tablet, Refills: 3    Comments: DOSE INCREASE; Kidney Transplant z94.0; 11/26/16         STOP taking these medications       nystatin (MYCOSTATIN) 100,000 unit/mL suspension Comments:   Reason for Stopping:                 Discharge Procedure Orders  Diet general     Lifting restrictions   Order Comments: Do not lift >10 pounds for 6 weeks     Call MD for:  temperature >100.4     Call MD for:  persistent nausea and vomiting or diarrhea     Call MD for:  severe uncontrolled pain     Call MD for:  redness, tenderness, or signs of infection (pain, swelling, redness, odor or green/yellow discharge around incision site)     Call MD for:  difficulty breathing or increased cough     Call MD for:  severe  persistent headache     Call MD for:  worsening rash     Call MD for:  persistent dizziness, light-headedness, or visual disturbances     Call MD for:  increased confusion or weakness     Call MD for:   Order Comments: For any concerning signs or symptoms     No dressing needed

## 2017-01-06 NOTE — H&P
Inpatient Radiology Pre-procedure Note    History of Present Illness:    Alin Burkett is a 66 y.o. male who presents for ultrasound guided kidney transplant biopsy.    Admission H&P reviewed.    Past Medical History   Diagnosis Date    Acidosis     Adrenal adenoma     Anemia associated with chronic renal failure     Arrhythmia, onset 1995 5/1/2015    Awaiting organ transplant status 11/26/2013    Basal cell carcinoma 06/12/2012     left nasal tip    Blood type B+ 11/26/2013    Cancer     Celiac artery dissection     Chronic diarrhea     Chronic urethral stricture     Congenital absence of kidney      left    Dissecting aortic aneurysm (any part), abdominal     Encounter for blood transfusion     ESRD (end stage renal disease) 06/16/2010    H/O: urethral stricture     History of AAA (abdominal aortic aneurysm) repair     Hypertension     Hypokalemia     Hypothyroidism 1/10/2014    Inguinal hernia bilateral, non-recurrent     Kidney stones     Organ transplant candidate 11/26/2013    Plantar warts 1/10/2014    Secondary hyperparathyroidism, renal     Thyroid disease      Past Surgical History   Procedure Laterality Date    Gastrojejunostomy      Hernia repair      Hemorrhoid surgery      Aorta - superior mesenteric artery bypass graft      Percutaneous nephrolithotripsy      Right eswl  6/26/12    Right  eswl  10/31/12    Bladder neck reconstruction      Abdominal surgery       exploratory lapatomy x 2    Bladder surgery      Cystoscopy      Lithotripsy         Review of Systems:   As documented in primary team H&P    Home Meds:   Prior to Admission medications    Medication Sig Start Date End Date Taking? Authorizing Provider   cinacalcet (SENSIPAR) 30 MG Tab Take 1 tablet (30 mg total) by mouth daily with breakfast. 12/12/16  Yes Travis Zimmerman MD   famotidine (PEPCID) 20 MG tablet Take 1 tablet (20 mg total) by mouth every evening. 11/28/16   Travis Zimmerman MD   [facility]  di & mono-sod phos mono (K-PHOS-NEUTRAL) 250 mg Tab Take 2 tablets by mouth 3 (three) times daily. 12/29/16 12/29/17  Travis Zimmerman MD   levothyroxine (SYNTHROID) 112 MCG tablet Take 1 tablet (112 mcg total) by mouth once daily. 1/23/15   Kristie Copeland PA-C   magnesium oxide (MAG-OX) 400 mg tablet Take 1 tablet (400 mg total) by mouth once daily. 12/22/16   Travis Zimmerman MD   methylcellulose, laxative, (CITRUCEL) 500 mg Tab Take 1 tablet by mouth 2 (two) times daily. 12/28/16   Andrew Lopez Jr., MD   metoprolol tartrate (LOPRESSOR) 25 MG tablet Take 1 tablet (25 mg total) by mouth 2 (two) times daily. 11/30/16 11/30/17  Travis Zimmerman MD   multivitamin (ONE DAILY MULTIVITAMIN) per tablet Take 1 tablet by mouth once daily.    Deysi Camerno MD   mycophenolate (CELLCEPT) 250 mg Cap Take 1 capsule (250 mg total) by mouth 2 (two) times daily. Z94.0/Kidney Transplant on 11/26/16 12/22/16   Travis Zimmerman MD   nifedipine (PROCARDIA-XL) 60 MG (OSM) 24 hr tablet Take 1 tablet (60 mg total) by mouth 2 (two) times daily. 11/30/16 11/30/17  Travis Zimmerman MD   nystatin (MYCOSTATIN) 100,000 unit/mL suspension Take 5 mLs (500,000 Units total) by mouth 2 hours after meals and at bedtime. Stop: 12/26/16 11/28/16   Travis Zimmerman MD   oxycodone-acetaminophen (PERCOCET) 5-325 mg per tablet Take 1 tablet by mouth every 6 (six) hours as needed. 12/28/16   Andrew Lopez Jr., MD   sodium bicarbonate 650 MG tablet Take 3 tablets (1,950 mg total) by mouth 2 (two) times daily. 1/4/17   Deb Gardner MD   sulfamethoxazole-trimethoprim 400-80mg (BACTRIM,SEPTRA) 400-80 mg per tablet Take 1 tablet by mouth once daily. Stop: 11/26/17 12/14/16   Travis Zimmerman MD   tacrolimus (PROGRAF) 1 MG Cap Take 8 capsules (8mg total) in AM & 7 capsules (7mg total) in PM by mouth daily. Z94.0/Kidney Transplant on 11/26/16 1/4/17   Deb Gardner MD   valganciclovir (VALCYTE) 450 mg Tab Take 1 tablet (450 mg total) by  mouth once daily. Stop: 2/24/17 12/14/16   Travis Zimmerman MD     Scheduled Meds:    cinacalcet  30 mg Oral Daily with breakfast    famotidine  20 mg Oral QHS    heparin (porcine)  5,000 Units Subcutaneous Q8H    k phos di & mono-sod phos mono  500 mg Oral TID    levothyroxine  112 mcg Oral Daily    methylPREDNISolone (SOLU-Medrol) IVPB   Intravenous Daily    metoprolol tartrate  25 mg Oral BID    multivitamin  1 tablet Oral Daily    mycophenolate  250 mg Oral BID    nifedipine  60 mg Oral BID    nystatin  500,000 Units Oral QID (PC + HS)    sodium bicarbonate  1,950 mg Oral BID    sodium chloride 0.9%  3 mL Intravenous Q8H    sulfamethoxazole-trimethoprim 400-80mg  1 tablet Oral Daily    tacrolimus  7 mg Oral Daily    tacrolimus  8 mg Oral Daily    valganciclovir  450 mg Oral Daily     Continuous Infusions:    PRN Meds:desmopressin (DDAVP) IVPB, dextrose 50%, dextrose 50%, glucagon (human recombinant), glucose, glucose, insulin aspart, ondansetron, oxycodone-acetaminophen     Anticoagulants/Antiplatelets: no anticoagulation    Allergies: Review of patient's allergies indicates:    No Known Allergies    Sedation Hx: have not been any systemic reactions    Labs:    Recent Labs  Lab 01/05/17  1712   INR 0.9       Recent Labs  Lab 01/06/17  0507   WBC 4.03   HGB 11.8*   HCT 34.6*   MCV 91         Recent Labs  Lab 01/04/17  1352  01/06/17  0507   GLU 91  < > 133*     < > 139   K 4.2  < > 4.7     < > 110   CO2 21*  < > 19*   BUN 42*  < > 32*   CREATININE 2.6*  < > 1.5*   CALCIUM 8.7  < > 9.1   MG  --   < > 2.1   *  < > 128*   AST 57*  < > 47*   ALBUMIN 3.8  3.8  < > 3.6   BILITOT 0.5  < > 0.4   BILIDIR 0.2  --   --    < > = values in this interval not displayed.      Vitals:  Temp: 98.3 °F (36.8 °C) (01/06/17 0800)  Pulse: 76 (01/06/17 0800)  Resp: 18 (01/06/17 0800)  BP: 123/83 (01/06/17 0800)  SpO2: 99 % (01/06/17 0800)     Physical Exam:  ASA: 2  Mallampati: 1    General:  no acute distress  Mental Status: alert and oriented to person, place and time  HEENT: normocephalic, atraumatic  Chest: unlabored breathing  Heart: regular heart rate  Abdomen: nondistended  Extremity: moves all extremities    Plan: Ultrasound guided kidney transplant biopsy.  Sedation Plan: local.    Antwon Pérez MD  PGY-4  Department of Radiology  [phone]

## 2017-01-06 NOTE — PROGRESS NOTES
Admit Note/Discharge Note    Met with patient to assess needs. Patient is a 66 y.o. in a relationship male, admitted for DIVINE. Pt was admitted for hypertension.     Patient admitted from home on 1/5/2017 .  At this time, patient presents as alert and oriented x 4, pleasant, good eye contact, well groomed, recall good, concentration/judgement good, calm, communicative, cooperative and asking and answering questions appropriately. Pt reported is feeling well, has been feeling well and believes the test are false. Pt also reported his caregivers are home.     Household/Family Systems     Patient resides with patient's significant other, at [address] [place] [address].  Support system includes s/o Jayden Winston for 24 yrs, daughters Trixie and Yohannes. Pt also has 3 supportive Pablo ,Sadiq, and Salma siblings and reports all family as supportive.  Patient does not have dependents that are need of being cared for.     Patients primary caregiver is Jayden Winston, patients significant other, phone number [phone] and pt reported daughter Yohannes will also assist [phone].  Confirmed patients contact information is [phone] (home);     Telephone Information:   Mobile [phone]   .    During admission, patient's caregiver at home Confirmed patient and patients caregivers do have access to reliable transportation.    Cognitive Status/Learning     Patient reports reading ability as college and states patient does have difficulty with seeing and wears trifocals.  Patient reports patient learns best by a combination of verbal, written, and hands-on instruction.     Needed: No.   Highest education level: Associate/Bachelor Degree    Vocation/Disability   .  Working for Income: No  If no, reason not working: Disability  Patient is disabled due to ESRD since 6/12/2010.  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    Adherence     Patient reports a high level of  adherence to patients health care regimen.  Adherence counseling and education provided.  Patient's caregiver verbalizes understanding.    Substance Use    Patient reports the following substance usage.    Tobacco: Pt is a former smoker of 40yrs.   Alcohol: Pt stoped ETHO in .  Illicit Drugs/Non-prescribed Medications: THC in youth.  Patient states clear understanding of the potential impact of substance use.  Substance abstinence/cessation counseling, education and resources provided and reviewed.     Services Utilizing/ADLS    Infusion Service: Prior to admission, patient utilizing? no  Home Health: Prior to admission, patient utilizing? no  DME: Prior to admission, yes BP cuff  Pulmonary/Cardiac Rehab: Prior to admission, no  Dialysis:  Prior to admission, yes  Transplant Specialty Pharmacy:  Prior to admission, pt was using [facility].     Prior to admission, patient reports patient was independent with ADLS and was driving.  Patient reports patient is able to care for self at this time.  Patient indicates a willingness to care for self once medically cleared to do so.    Insurance/Medications    Insured by   Payor/Plan Subscr  Sex Relation Sub. Ins. ID Effective Group Num   1. MEDICARE - [place]* TANG FALK 1950 Male  [id number] 6/1/10                                    [address]   2. [employer] CROSS * TANG FALK 1950 Male  [id number] 11/1/15 [id number]                                   [address] [address]      Primary Insurance (for UNOS reporting): Public Insurance - Medicare FFS (Fee For Service)  Secondary Insurance (for UNOS reporting): Private Insurance    Patient reports patient is able to obtain and afford medications at this time and at time of discharge. Pt reports since transplant, he has not had problems affording medications.     Living Will/Healthcare Power of     Patient states patient does not have a LW and/or HCPA.      Coping/Mental Health    Patient is coping  "adequately with post transplant process thus far. Pt was is great spirits and talkative. SW provided active listening, support, and encouragement in the post transplant process. Pt reported "All praise be to Allah" and that when he thinks positive, positive things happen.  Patient denies mental health difficulties.     Discharge Planning    At time of discharge, patient plans to return to patient's home and s/o and daughter are still caring for him.  Patients will transport patient.  Per rounds today, expected discharge date is today  1/6/2017. Patient verbalize understanding and is involved in treatment planning and discharge process.     Additional Concerns    Patient is being followed for needs, education, resources, information, emotional support, supportive counseling, and for supportive and skilled discharge plan of care. Patient denies additional needs and/or concerns at this time. Patient verbalizes understanding and agreement with information reviewed, social work availability, and how to access available resources as needed.   "

## 2017-01-06 NOTE — PROCEDURES
Radiology Post-Procedure Note    Pre Op Diagnosis: Renal dysfunction    Post Op Diagnosis: Same    Procedure: Ultrasound guided renal biopsy    Procedure performed by: Kaylyn Liao MD    Written Informed Consent Obtained: Yes    Specimen Removed: YES 2 specimens    Estimated Blood Loss: Minimal    Findings: Moderate sedation and local anesthesia were used.    A 16-gauge Monopty biopsy device was used to remove 2 specimens from the transplant kidney under ultrasound guidance.  Tissue was evaluated for adequacy and sent to pathology for further analysis.    The patient tolerated the procedure well and there were no complications.  Please see Imaging report for further details.    Antwon Pérez MD  PGY-4  Department of Radiology  [phone]

## 2017-01-06 NOTE — H&P
History & Physical   Kidney Transplant Surgery      SUBJECTIVE:     Chief Complaint/Reason for Admission: DIVINE    History of Present Illness: Mr. Mi is a 66 y.o. M with ESRD secondary to HTN and congenitally absent L kidney. He was on hemodialysis since 06/16/2010. He is now s/p DDKTX (DBD donor, KDPI:36%) on 11/26/16. Of significance, donor had positive RPR screening testing. RPR checked on pt, negative. Oral doxycycline was started emperically on 11/27, and he was transitioned to 2.4 mu IM PCN with plan for weekly injections x 3 weeks per ID. RPR to be rechecked at 1, 3, 6, 9, and 12 months to exclude transmission per ID.     Pt is being admitted from home today for DIVINE. Cr 2.6 from baseline 1.3. US 1/5 without hydronephrosis, stable RIs. Renal vein stenosis noted on US, reviewed by Dr. Lopez, will continue to monitor. Pt denies fever, chills, nausea, vomiting, CP, SOB, diarrhea or any recent sick contacts. Plan to start SMP x3, STAT DSAs and biopsy in the AM.     Facility-Administered Medications Prior to Admission   Medication    loperamide capsule 2 mg    penicillin G benzathine (BICILLIN LA) injection 2.4 Million Units    penicillin G benzathine (BICILLIN LA) injection 2.4 Million Units     PTA Medications   Medication Sig    cinacalcet (SENSIPAR) 30 MG Tab Take 1 tablet (30 mg total) by mouth daily with breakfast.    famotidine (PEPCID) 20 MG tablet Take 1 tablet (20 mg total) by mouth every evening.    k phos di & mono-sod phos mono (K-PHOS-NEUTRAL) 250 mg Tab Take 2 tablets by mouth 3 (three) times daily.    levothyroxine (SYNTHROID) 112 MCG tablet Take 1 tablet (112 mcg total) by mouth once daily.    magnesium oxide (MAG-OX) 400 mg tablet Take 1 tablet (400 mg total) by mouth once daily.    methylcellulose, laxative, (CITRUCEL) 500 mg Tab Take 1 tablet by mouth 2 (two) times daily.    metoprolol tartrate (LOPRESSOR) 25 MG tablet Take 1 tablet (25 mg total) by mouth 2 (two) times daily.     multivitamin (ONE DAILY MULTIVITAMIN) per tablet Take 1 tablet by mouth once daily.    mycophenolate (CELLCEPT) 250 mg Cap Take 1 capsule (250 mg total) by mouth 2 (two) times daily. Z94.0/Kidney Transplant on 11/26/16    nifedipine (PROCARDIA-XL) 60 MG (OSM) 24 hr tablet Take 1 tablet (60 mg total) by mouth 2 (two) times daily.    nystatin (MYCOSTATIN) 100,000 unit/mL suspension Take 5 mLs (500,000 Units total) by mouth 2 hours after meals and at bedtime. Stop: 12/26/16    oxycodone-acetaminophen (PERCOCET) 5-325 mg per tablet Take 1 tablet by mouth every 6 (six) hours as needed.    sodium bicarbonate 650 MG tablet Take 3 tablets (1,950 mg total) by mouth 2 (two) times daily.    sulfamethoxazole-trimethoprim 400-80mg (BACTRIM,SEPTRA) 400-80 mg per tablet Take 1 tablet by mouth once daily. Stop: 11/26/17    tacrolimus (PROGRAF) 1 MG Cap Take 8 capsules (8mg total) in AM & 7 capsules (7mg total) in PM by mouth daily. Z94.0/Kidney Transplant on 11/26/16    valganciclovir (VALCYTE) 450 mg Tab Take 1 tablet (450 mg total) by mouth once daily. Stop: 2/24/17       Review of patient's allergies indicates:  No Known Allergies    Past Medical History   Diagnosis Date    Acidosis     Adrenal adenoma     Anemia associated with chronic renal failure     Arrhythmia, onset 1995 5/1/2015    Awaiting organ transplant status 11/26/2013    Basal cell carcinoma 06/12/2012     left nasal tip    Blood type B+ 11/26/2013    Cancer     Celiac artery dissection     Chronic diarrhea     Chronic urethral stricture     Congenital absence of kidney      left    Dissecting aortic aneurysm (any part), abdominal     Encounter for blood transfusion     ESRD (end stage renal disease) 06/16/2010    H/O: urethral stricture     History of AAA (abdominal aortic aneurysm) repair     Hypertension     Hypokalemia     Hypothyroidism 1/10/2014    Inguinal hernia bilateral, non-recurrent     Kidney stones     Organ transplant  candidate 11/26/2013    Plantar warts 1/10/2014    Secondary hyperparathyroidism, renal     Thyroid disease      Past Surgical History   Procedure Laterality Date    Gastrojejunostomy      Hernia repair      Hemorrhoid surgery      Aorta - superior mesenteric artery bypass graft      Percutaneous nephrolithotripsy      Right eswl  6/26/12    Right  eswl  10/31/12    Bladder neck reconstruction      Abdominal surgery       exploratory lapatomy x 2    Bladder surgery      Cystoscopy      Lithotripsy       Family History   Problem Relation Age of Onset    Diabetes Mother     Cancer Brother      thyroid cancer    Stroke Maternal Aunt     Kidney disease Paternal Uncle     Kidney disease Cousin     Melanoma Neg Hx     Psoriasis Neg Hx     Lupus Neg Hx     Eczema Neg Hx      Social History   Substance Use Topics    Smoking status: Former Smoker     Years: 40.00     Types: Cigarettes     Quit date: 6/16/2010    Smokeless tobacco: Never Used    Alcohol use No      Comment: stopped ETOH in 2010        Review of Systems   Constitutional: Negative for appetite change, chills, diaphoresis, fatigue and fever.   Respiratory: Negative for cough, shortness of breath and wheezing.    Cardiovascular: Negative for chest pain, palpitations and leg swelling.   Gastrointestinal: Negative for abdominal distention, abdominal pain, constipation, diarrhea, nausea and vomiting.   Genitourinary: Negative for decreased urine volume, dysuria, frequency and urgency.   Allergic/Immunologic: Positive for immunocompromised state.   Neurological: Negative for dizziness, weakness, numbness and headaches.     OBJECTIVE:     Vital Signs (Most Recent)  Temp: 98.3 °F (36.8 °C) (01/05/17 1648)  Pulse: 70 (01/05/17 1648)  Resp: 16 (01/05/17 1648)  BP: 124/76 (01/05/17 1648)  SpO2: 99 % (01/05/17 1648)    Physical Exam   Constitutional: He is oriented to person, place, and time. He appears well-developed and well-nourished.   HENT:    Head: Normocephalic and atraumatic.   Eyes: EOM are normal.   Neck: Normal range of motion. Neck supple.   Cardiovascular: Normal rate, regular rhythm and intact distal pulses.  Exam reveals no gallop.    No murmur heard.  Pulmonary/Chest: Effort normal and breath sounds normal. No respiratory distress. He has no wheezes. He exhibits no tenderness.   Abdominal: Soft. Bowel sounds are normal. He exhibits no distension. There is no tenderness. There is no rebound and no guarding.   Musculoskeletal: Normal range of motion.   Neurological: He is alert and oriented to person, place, and time.   Skin: Skin is warm and dry.   Psychiatric: He has a normal mood and affect.     Laboratory  CBC:   Recent Labs  Lab 01/05/17  1712   WBC 3.82*   RBC 3.79*   HGB 11.6*   HCT 34.3*      MCV 91   MCH 30.6   MCHC 33.8     CMP:   Recent Labs  Lab 01/05/17  1713   GLU 88   CALCIUM 8.8   ALBUMIN 3.8   PROT 7.3      K 4.3   CO2 22*   *   BUN 37*   CREATININE 1.6*   ALKPHOS 110   *   AST 68*   BILITOT 0.4     Coagulation: No results for input(s): INR, APTT in the last 168 hours.    Invalid input(s): PT    Diagnostic Results:  Labs: Reviewed  US: Reviewed    ASSESSMENT/PLAN:     1. s/p DDKTX (DBD donor, KDPI:36%) on 11/26/16 for HTN: Cr elevated. US with stable RIs, renal vein stenosis, no hydro. Reviewed by Dr. Lopez. Pt denies changes in UOP. Monitor.   2. DIVINE: Cr elevated. Suspected rejection. Start SMP. Biopsy kidney in AM. STAT DSAs ordered.   3. Management of immunosuppressants: Cont prograf and cellcept. Monitor for toxicity and adjust dose accordingly. Cont valcyte, bactrim and nystatin for opportunistic infection prophylaxis.   4. Donor positive RPR screening testing. RPR pt, negative. Oral doxycycline was started emperically on 11/27,  transitioned to 2.4 mu IM PCN with plan for weekly injections x 3 weeks per ID. RPR to be rechecked at 1, 3, 6, 9, and 12 months to exclude transmission per ID.   5. Hx  of HTN: cont coreg and nifedipine. Monitor.   6. Acidosis: cont bicarb BID. Monitor.   7. DVT prophylaxis: subq heparin.   8. GI prophylaxis: H2 blocker.   9. Dispo: admit to inpatient.

## 2017-01-07 LAB — BACTERIA UR CULT: NORMAL

## 2017-01-09 ENCOUNTER — TELEPHONE (OUTPATIENT)
Dept: TRANSPLANT | Facility: CLINIC | Age: 67
End: 2017-01-09

## 2017-01-09 ENCOUNTER — HOSPITAL ENCOUNTER (INPATIENT)
Facility: HOSPITAL | Age: 67
LOS: 7 days | Discharge: HOME | DRG: 699 | End: 2017-01-16
Attending: TRANSPLANT SURGERY | Admitting: TRANSPLANT SURGERY
Payer: MEDICARE

## 2017-01-09 ENCOUNTER — TELEPHONE (OUTPATIENT)
Dept: TRANSPLANT | Facility: HOSPITAL | Age: 67
End: 2017-01-09

## 2017-01-09 DIAGNOSIS — Z94.0 DECEASED-DONOR KIDNEY TRANSPLANT: ICD-10-CM

## 2017-01-09 DIAGNOSIS — E83.39 HYPOPHOSPHATEMIA: ICD-10-CM

## 2017-01-09 DIAGNOSIS — N20.0 CALCIUM NEPHROLITHIASIS: ICD-10-CM

## 2017-01-09 DIAGNOSIS — Z29.89 PROPHYLACTIC IMMUNOTHERAPY: ICD-10-CM

## 2017-01-09 DIAGNOSIS — Z79.60 LONG-TERM USE OF IMMUNOSUPPRESSANT MEDICATION: ICD-10-CM

## 2017-01-09 DIAGNOSIS — E87.20 METABOLIC ACIDOSIS: ICD-10-CM

## 2017-01-09 DIAGNOSIS — Z94.0 S/P KIDNEY TRANSPLANT: ICD-10-CM

## 2017-01-09 DIAGNOSIS — T86.11 ACUTE REJECTION OF KIDNEY TRANSPLANT: ICD-10-CM

## 2017-01-09 DIAGNOSIS — T86.11 ACUTE REJECTION OF KIDNEY TRANSPLANT: Primary | ICD-10-CM

## 2017-01-09 LAB
ALBUMIN SERPL BCP-MCNC: 3.8 G/DL
ANION GAP SERPL CALC-SCNC: 7 MMOL/L
APTT BLDCRRT: 23.1 SEC
BASOPHILS # BLD AUTO: 0.03 K/UL
BASOPHILS NFR BLD: 0.7 %
BUN SERPL-MCNC: 33 MG/DL
CALCIUM SERPL-MCNC: 8.3 MG/DL
CHLORIDE SERPL-SCNC: 109 MMOL/L
CLASS I ANTIBODY COMMENTS - LUMINEX: NORMAL
CLASS II ANTIBODY COMMENTS - LUMINEX: NORMAL
CO2 SERPL-SCNC: 22 MMOL/L
CREAT SERPL-MCNC: 1.9 MG/DL
DIFFERENTIAL METHOD: ABNORMAL
DSA1 TESTING DATE: NORMAL
DSA2 TESTING DATE: NORMAL
EOSINOPHIL # BLD AUTO: 0.2 K/UL
EOSINOPHIL NFR BLD: 4.8 %
ERYTHROCYTE [DISTWIDTH] IN BLOOD BY AUTOMATED COUNT: 16.6 %
EST. GFR  (AFRICAN AMERICAN): 41.8 ML/MIN/1.73 M^2
EST. GFR  (NON AFRICAN AMERICAN): 36.2 ML/MIN/1.73 M^2
GLUCOSE SERPL-MCNC: 108 MG/DL
HCT VFR BLD AUTO: 35.2 %
HGB BLD-MCNC: 12 G/DL
INR PPP: 0.9
LYMPHOCYTES # BLD AUTO: 0.5 K/UL
LYMPHOCYTES NFR BLD: 11.2 %
MAGNESIUM SERPL-MCNC: 2.2 MG/DL
MCH RBC QN AUTO: 30.8 PG
MCHC RBC AUTO-ENTMCNC: 34.1 %
MCV RBC AUTO: 90 FL
MONOCYTES # BLD AUTO: 0.1 K/UL
MONOCYTES NFR BLD: 3.2 %
NEUTROPHILS # BLD AUTO: 3.5 K/UL
NEUTROPHILS NFR BLD: 79 %
PHOSPHATE SERPL-MCNC: 2.1 MG/DL
PLATELET # BLD AUTO: 192 K/UL
PMV BLD AUTO: 10.8 FL
POTASSIUM SERPL-SCNC: 4.3 MMOL/L
PROTHROMBIN TIME: 10 SEC
RBC # BLD AUTO: 3.9 M/UL
SERUM COLLECTION DT - LUMINEX CLASS I: NORMAL
SERUM COLLECTION DT - LUMINEX CLASS II: NORMAL
SODIUM SERPL-SCNC: 138 MMOL/L
WBC # BLD AUTO: 4.37 K/UL

## 2017-01-09 PROCEDURE — 87086 URINE CULTURE/COLONY COUNT: CPT

## 2017-01-09 PROCEDURE — 85610 PROTHROMBIN TIME: CPT

## 2017-01-09 PROCEDURE — 80069 RENAL FUNCTION PANEL: CPT

## 2017-01-09 PROCEDURE — 83735 ASSAY OF MAGNESIUM: CPT

## 2017-01-09 PROCEDURE — 85025 COMPLETE CBC W/AUTO DIFF WBC: CPT

## 2017-01-09 PROCEDURE — 20600001 HC STEP DOWN PRIVATE ROOM

## 2017-01-09 PROCEDURE — 85730 THROMBOPLASTIN TIME PARTIAL: CPT

## 2017-01-09 PROCEDURE — 36415 COLL VENOUS BLD VENIPUNCTURE: CPT

## 2017-01-09 RX ORDER — SODIUM CHLORIDE 0.9 % (FLUSH) 0.9 %
3 SYRINGE (ML) INJECTION EVERY 8 HOURS
Status: DISCONTINUED | OUTPATIENT
Start: 2017-01-09 | End: 2017-01-16 | Stop reason: HOSPADM

## 2017-01-09 RX ORDER — ONDANSETRON 8 MG/1
8 TABLET, ORALLY DISINTEGRATING ORAL EVERY 8 HOURS PRN
Status: DISCONTINUED | OUTPATIENT
Start: 2017-01-09 | End: 2017-01-16 | Stop reason: HOSPADM

## 2017-01-09 NOTE — TELEPHONE ENCOUNTER
----- Message from Travis Zimmerman MD sent at 1/9/2017  2:27 PM CST -----  Please increase KPN to three times a day  Make sure patient is taking dose correctly

## 2017-01-09 NOTE — IP AVS SNAPSHOT
[facility]  [address]  [facility] [address]  Phone: [phone]           Patient Discharge Instructions     Our goal is to set you up for success. This packet includes information on your condition, medications, and your home care. It will help you to care for yourself so you don't get sicker and need to go back to the hospital.     Please ask your nurse if you have any questions.        There are many details to remember when preparing to leave the hospital. Here is what you will need to do:    1. Take your medicine. If you are prescribed medications, review your Medication List in the following pages. You may have new medications to  at the pharmacy and others that you'll need to stop taking. Review the instructions for how and when to take your medications. Talk with your doctor or nurses if you are unsure of what to do.     2. Go to your follow-up appointments. Specific follow-up information is listed in the following pages. Your may be contacted by a transition nurse or clinical provider about future appointments. Be sure we have all of the phone numbers to reach you, if needed. Please contact your provider's office if you are unable to make an appointment.     3. Watch for warning signs. Your doctor or nurse will give you detailed warning signs to watch for and when to call for assistance. These instructions may also include educational information about your condition. If you experience any of warning signs to your health, call your doctor.               [facility]  Unless otherwise directed by your provider, please contact [facility], our nurse care line that is available for 24/7 assistance.     [phone] (toll-free)    Registered nurses in the [facility] provide clinical advisement, health education, appointment booking, and other advisory services.                    ** Verify the list of medication(s) below is accurate and up  to date. Carry this with you in case of emergency. If your medications have changed, please notify your healthcare provider.             Medication List      START taking these medications        Additional Info                      ketoconazole 200 mg Tab   Commonly known as:  NIZORAL   Quantity:  15 tablet   Refills:  5   Dose:  100 mg    Last time this was given:  100 mg on 1/16/2017  9:16 AM   Instructions:  Take 0.5 tablets (100 mg total) by mouth once daily.     Begin Date    AM    Noon    PM    Bedtime       linezolid 600 mg Tab   Commonly known as:  ZYVOX   Quantity:  14 tablet   Refills:  0   Dose:  600 mg   Indications:  Urinary Tract Infection    Last time this was given:  600 mg on 1/16/2017  9:17 AM   Instructions:  Take 1 tablet (600 mg total) by mouth every 12 (twelve) hours.     Begin Date    AM    Noon    PM    Bedtime       nystatin 100,000 unit/mL suspension   Commonly known as:  MYCOSTATIN   Quantity:  473 mL   Refills:  0   Dose:  981459 Units    Last time this was given:  500,000 Units on 1/16/2017  1:11 PM   Instructions:  Take 5 mLs (500,000 Units total) by mouth 4 (four) times daily with meals and nightly. Stop: 2/13/17     Begin Date    AM    Noon    PM    Bedtime       predniSONE 10 MG tablet   Commonly known as:  DELTASONE   Quantity:  60 tablet   Refills:  11    Last time this was given:  20 mg on 1/16/2017  9:16 AM   Instructions:  Take 20 mg PO QD 1/13-2/12; 15 mg PO QD 2/13-3/12; 10 mg PO QD 3/13-4/12; then 5 mg PO QD thereafter     Begin Date    AM    Noon    PM    Bedtime         CHANGE how you take these medications        Additional Info                      k phos di & mono-sod phos mono 250 mg Tab   Commonly known as:  K-PHOS-NEUTRAL   Quantity:  270 tablet   Refills:  11   Dose:  750 mg   Indications:  Hypophosphatemia   What changed:  how much to take   Comments:  DOSE INCREASE    Last time this was given:  3 tablets on 1/16/2017  1:11 PM   Instructions:  Take 3 tablets by  mouth 3 (three) times daily.     Begin Date    AM    Noon    PM    Bedtime       metoprolol tartrate 25 MG tablet   Commonly known as:  LOPRESSOR   Quantity:  30 tablet   Refills:  11   Dose:  12.5 mg   What changed:  how much to take    Last time this was given:  12.5 mg on 1/16/2017  9:16 AM   Instructions:  Take 0.5 tablets (12.5 mg total) by mouth 2 (two) times daily.     Begin Date    AM    Noon    PM    Bedtime       mycophenolate 250 mg Cap   Commonly known as:  CELLCEPT   Quantity:  180 capsule   Refills:  11   Dose:  750 mg   Indications:  Prevention of Kidney Transplant Rejection   What changed:  how much to take   Comments:  DOSE DECREASE; Kidney Transplant z94.0; 11/26/16    Last time this was given:  500 mg on 1/16/2017  9:16 AM   Instructions:  Take 3 capsules (750 mg total) by mouth 2 (two) times daily. Z94.0/Kidney Transplant on 11/26/16     Begin Date    AM    Noon    PM    Bedtime       tacrolimus 1 MG Cap   Commonly known as:  PROGRAF   Quantity:  450 capsule   Refills:  11   Dose:  7 mg   Indications:  Prevention of Kidney Transplant Rejection   What changed:    - how much to take  - how to take this  - when to take this  - additional instructions   Comments:  DOSE INCREASE; Z94.0/Kidney Transplant on 11/26/16    Last time this was given:  7 mg on 1/16/2017  9:16 AM   Instructions:  Take 7 capsules (7 mg total) by mouth every 12 (twelve) hours. Z94.0/Kidney Transplant on 11/26/16     Begin Date    AM    Noon    PM    Bedtime         CONTINUE taking these medications        Additional Info                      famotidine 20 MG tablet   Commonly known as:  PEPCID   Quantity:  30 tablet   Refills:  11   Dose:  20 mg    Last time this was given:  20 mg on 1/15/2017  9:53 PM   Instructions:  Take 1 tablet (20 mg total) by mouth every evening.     Begin Date    AM    Noon    PM    Bedtime       levothyroxine 112 MCG tablet   Commonly known as:  SYNTHROID   Quantity:  30 tablet   Refills:  6   Dose:   112 mcg    Last time this was given:  112 mcg on 1/16/2017  5:54 AM   Instructions:  Take 1 tablet (112 mcg total) by mouth once daily.     Begin Date    AM    Noon    PM    Bedtime       magnesium oxide 400 mg tablet   Commonly known as:  MAG-OX   Quantity:  30 tablet   Refills:  11   Dose:  400 mg    Last time this was given:  400 mg on 1/16/2017  9:16 AM   Instructions:  Take 1 tablet (400 mg total) by mouth once daily.     Begin Date    AM    Noon    PM    Bedtime       ONE DAILY MULTIVITAMIN per tablet   Refills:  0   Dose:  1 tablet   Generic drug:  multivitamin    Instructions:  Take 1 tablet by mouth once daily.     Begin Date    AM    Noon    PM    Bedtime       oxycodone-acetaminophen 5-325 mg per tablet   Commonly known as:  PERCOCET   Quantity:  40 tablet   Refills:  0   Dose:  1 tablet    Last time this was given:  1 tablet on 1/16/2017  6:02 AM   Instructions:  Take 1 tablet by mouth every 6 (six) hours as needed.     Begin Date    AM    Noon    PM    Bedtime       sodium bicarbonate 650 MG tablet   Quantity:  180 tablet   Refills:  11   Dose:  1950 mg   Comments:  DOSE INCREASE    Last time this was given:  1,950 mg on 1/16/2017  9:16 AM   Instructions:  Take 3 tablets (1,950 mg total) by mouth 2 (two) times daily.     Begin Date    AM    Noon    PM    Bedtime       sulfamethoxazole-trimethoprim 400-80mg 400-80 mg per tablet   Commonly known as:  BACTRIM,SEPTRA   Quantity:  30 tablet   Refills:  11   Dose:  1 tablet   Indications:  Opportunistic infection prophylaxis   Comments:  DOSE INCREASE; Kidney Transplant z94.0; 11/26/16    Last time this was given:  1 tablet on 1/16/2017  9:16 AM   Instructions:  Take 1 tablet by mouth once daily. Stop: 11/26/17     Begin Date    AM    Noon    PM    Bedtime       valganciclovir 450 mg Tab   Commonly known as:  VALCYTE   Quantity:  30 tablet   Refills:  3   Dose:  450 mg   Comments:  DOSE INCREASE; Kidney Transplant z94.0; 11/26/16    Last time this was given:   450 mg on 1/16/2017  9:16 AM   Instructions:  Take 1 tablet (450 mg total) by mouth once daily. Stop: 2/24/17     Begin Date    AM    Noon    PM    Bedtime            Where to Get Your Medications      These medications were sent to [facility] - [place], [place] - [address]  [address] [facility] [address]     Phone:  [phone]     k phos di & mono-sod phos mono 250 mg Tab    ketoconazole 200 mg Tab    linezolid 600 mg Tab    mycophenolate 250 mg Cap    nystatin 100,000 unit/mL suspension    predniSONE 10 MG tablet    tacrolimus 1 MG Cap         Information about where to get these medications is not yet available     ! Ask your nurse or doctor about these medications     metoprolol tartrate 25 MG tablet                  Please bring to all follow up appointments:    1. A copy of your discharge instructions.  2. All medicines you are currently taking in their original bottles.  3. Identification and insurance card.    Please arrive 15 minutes ahead of scheduled appointment time.    Please call 24 hours in advance if you must reschedule your appointment and/or time.        Your Scheduled Appointments     Jan 18, 2017  9:00 AM CST   Fasting Lab with LAB, [facility] SAT   [facility]    [address]  [place] [address]   [phone]            Jan 20, 2017  8:30 AM CST   Fasting Lab with LAB, [facility] SAT   [place] [facility]    [address]  [place] [address]   [phone]            Jan 23, 2017  8:30 AM CST   Fasting Lab with LAB, SL[facility]LL SAT   [place] [facility]    [address]  [place] [address]   [phone]            Jan 23, 2017  8:30 AM CST   Urine with SPECIMEN, SLIDELL   [place] [facility]    [address]  [place] [address]   [phone]            Jan 30, 2017  8:45 AM CST   Fasting Lab with LAB, [facility] SAT   [facility] [place] [facility]    [address]  [place] [address]   [phone]                Discharge Instructions     Future Orders    Activity as tolerated     Call MD for:  difficulty breathing or increased cough     Call MD for:  increased confusion or weakness     Call MD for:  persistent dizziness, light-headedness, or visual disturbances     Call MD for:  persistent nausea and vomiting or diarrhea     Call MD for:  redness, tenderness, or signs of infection (pain, swelling, redness, odor or green/yellow discharge around incision site)     Call MD for:  severe persistent headache     Call MD for:  severe uncontrolled pain     Call MD for:  temperature >100.4     Call MD for:  worsening rash     Diet general     Questions:    Total calories:      Fat restriction, if any:      Protein restriction, if any:      Na restriction, if any:      Fluid restriction:      Additional restrictions:      No dressing needed         Primary Diagnosis     Your primary diagnosis was:  Acute Rejection Of Renal Transplant      Admission Information     Date & Time Provider Department CSN    1/9/2017  8:49 PM Andrew Lopez Jr., MD [facility] [id number]      Care Providers     Provider Role Specialty Primary office phone    Andrew Lopez Jr., MD Attending Provider Transplant [phone]    Jovanna Omalley MD Consulting Physician  Nephrology [phone]    Travis Zimmerman MD Consulting Physician  Nephrology [phone]    Deb Gardner MD Consulting Physician  Transplant [phone]    Kyle Rouse Jr., MD Physician  Transplant [phone]    Vincenzo Austin MD Physician  Transplant [phone]    Andrew Lopez Jr., MD Physician  Transplant [phone]    Reinier Roche MD Physician  Transplant [phone]    Lake Kay MD Physician  Transplant [phone]    Davida Roberto MD Physician  Transplant [phone]    Goldy Cruz MD Physician  Transplant [phone]    Naeem Cordova MD Physician   "Transplant [phone]      Important Medicare Message          Most Recent Value    Important Message from Medicare Regarding Discharge Appeal Rights  Given to patient/caregiver, Explained to patient/caregiver, Signed/date by patient/caregiver [copy of initialed follow up IMM placed in blue chart for medical records] yes 01/16/2017 1347      Your Vitals Were     BP Pulse Temp Resp Height Weight    131/79 (BP Location: Right arm, Patient Position: Lying, BP Method: Automatic) 79 98.8 °F (37.1 °C) (Oral) 16 5' 11" (1.803 m) 60.8 kg (134 lb 0.6 oz)    SpO2 BMI             97% 18.69 kg/m2         Recent Lab Values        2/8/2012                           5:05 AM           A1C 5.2                       Pending Labs     Order Current Status    Urinalysis In process    Urine culture In process    Blood culture Preliminary result    Blood culture Preliminary result      Allergies as of 1/16/2017     No Known Allergies      Advance Directives     An advance directive is a document which, in the event you are no longer able to make decisions for yourself, tells your healthcare team what kind of treatment you do or do not want to receive, or who you would like to make those decisions for you.  If you do not currently have an advance directive, [facility] encourages you to create one.  For more information call:  [phone] ([phone]), [phone] ([phone]),  or log on to [email or web address].        Smoking Cessation     If you would like to quit smoking:   You may be eligible for free services if you are a [place] resident and started smoking cigarettes before September 1, 1988.  Call the Smoking Cessation Trust (SCT) toll free at (522) 149-5310 or (213) 573-3924.   Call 4-491-QUIT-NOW if you do not meet the above criteria.            Language Assistance Services     ATTENTION: Language assistance services are available, free of charge. Please call [phone].      ATENCIÓN: aminah Hollins " neri disposición servicios gratuitos de asistencia lingüística. Gilberto al [phone].     ELAYNE Ý: N?u b?n nói Ti?ng Vi?t, có các d?ch v? h? tr? ngôn ng? mi?n phí dành cho b?n. G?i s? [phone].        Chronic Kindey Disease Education [facility] complies with applicable Federal civil rights laws and does not discriminate on the basis of race, color, national origin, age, disability, or sex.

## 2017-01-09 NOTE — TELEPHONE ENCOUNTER
Renal Transplant Attending Brief Note:     Received prelim results of renal allograft biopsy performed on 1/6/17 for acute rise in Cr showing 21 glomeruli, none globally sclerosed, <5% interstitial fibrosis, no ACR, c4d negative, AVR CCT Type 2 (V1 lesion); plan is to admit for inpatient THYMO for treatment of vascular rejection     Deb Gardner MD

## 2017-01-09 NOTE — TELEPHONE ENCOUNTER
Notified patient of Dr. Gardner's review of 1/6/17 transplant kidney biopsy results. Informed patient that he will need to be admitted to hospital today for treatment of AV rejection. Patient verbalized understanding.     (1/6/17 Kidney biopsy results:  Renal allograft biopsy performed on 1/6/17 for acute rise in Cr showing 21 glomeruli, none globally sclerosed, <5% interstitial fibrosis, no ACR, c4d negative, AVR CCT Type 2 (V1 lesion); plan is to admit for inpatient THYMO)    Notified Ayaz CARLTON NP, Consuelo call RN, and On-call coordinator- Vladimir Ornelas RN

## 2017-01-10 LAB
ALBUMIN SERPL BCP-MCNC: 3.4 G/DL
ANION GAP SERPL CALC-SCNC: 8 MMOL/L
APTT BLDCRRT: 22.6 SEC
BASOPHILS # BLD AUTO: 0.03 K/UL
BASOPHILS NFR BLD: 0.8 %
BUN SERPL-MCNC: 30 MG/DL
CALCIUM SERPL-MCNC: 8.3 MG/DL
CHLORIDE SERPL-SCNC: 110 MMOL/L
CO2 SERPL-SCNC: 21 MMOL/L
CREAT SERPL-MCNC: 1.6 MG/DL
CYCLOSPORINE BLD LC/MS/MS-MCNC: <10 NG/ML
DIFFERENTIAL METHOD: ABNORMAL
EOSINOPHIL # BLD AUTO: 0.2 K/UL
EOSINOPHIL NFR BLD: 5.6 %
ERYTHROCYTE [DISTWIDTH] IN BLOOD BY AUTOMATED COUNT: 16.6 %
EST. GFR  (AFRICAN AMERICAN): 51.5 ML/MIN/1.73 M^2
EST. GFR  (NON AFRICAN AMERICAN): 44.5 ML/MIN/1.73 M^2
GLUCOSE SERPL-MCNC: 78 MG/DL
HCT VFR BLD AUTO: 34.1 %
HGB BLD-MCNC: 11.5 G/DL
INR PPP: 1
LYMPHOCYTES # BLD AUTO: 0.3 K/UL
LYMPHOCYTES NFR BLD: 8.7 %
MAGNESIUM SERPL-MCNC: 2.1 MG/DL
MCH RBC QN AUTO: 30.8 PG
MCHC RBC AUTO-ENTMCNC: 33.7 %
MCV RBC AUTO: 91 FL
MONOCYTES # BLD AUTO: 0.3 K/UL
MONOCYTES NFR BLD: 7.6 %
NEUTROPHILS # BLD AUTO: 2.7 K/UL
NEUTROPHILS NFR BLD: 76.2 %
PHOSPHATE SERPL-MCNC: 2.6 MG/DL
PLATELET # BLD AUTO: 180 K/UL
PMV BLD AUTO: 10.6 FL
POTASSIUM SERPL-SCNC: 4.4 MMOL/L
PROTHROMBIN TIME: 10.6 SEC
RBC # BLD AUTO: 3.73 M/UL
SODIUM SERPL-SCNC: 139 MMOL/L
TACROLIMUS BLD-MCNC: 8.8 NG/ML
WBC # BLD AUTO: 3.57 K/UL

## 2017-01-10 PROCEDURE — 85730 THROMBOPLASTIN TIME PARTIAL: CPT

## 2017-01-10 PROCEDURE — 85025 COMPLETE CBC W/AUTO DIFF WBC: CPT

## 2017-01-10 PROCEDURE — 82960 TEST FOR G6PD ENZYME: CPT

## 2017-01-10 PROCEDURE — 25000003 PHARM REV CODE 250: Performed by: PHYSICIAN ASSISTANT

## 2017-01-10 PROCEDURE — 63600175 PHARM REV CODE 636 W HCPCS: Performed by: STUDENT IN AN ORGANIZED HEALTH CARE EDUCATION/TRAINING PROGRAM

## 2017-01-10 PROCEDURE — 63600175 PHARM REV CODE 636 W HCPCS: Performed by: PHYSICIAN ASSISTANT

## 2017-01-10 PROCEDURE — 20600001 HC STEP DOWN PRIVATE ROOM

## 2017-01-10 PROCEDURE — 25000003 PHARM REV CODE 250: Performed by: STUDENT IN AN ORGANIZED HEALTH CARE EDUCATION/TRAINING PROGRAM

## 2017-01-10 PROCEDURE — 99233 SBSQ HOSP IP/OBS HIGH 50: CPT | Mod: ,,, | Performed by: PHYSICIAN ASSISTANT

## 2017-01-10 PROCEDURE — 83735 ASSAY OF MAGNESIUM: CPT

## 2017-01-10 PROCEDURE — 36415 COLL VENOUS BLD VENIPUNCTURE: CPT

## 2017-01-10 PROCEDURE — 80197 ASSAY OF TACROLIMUS: CPT

## 2017-01-10 PROCEDURE — 25000003 PHARM REV CODE 250: Performed by: NURSE PRACTITIONER

## 2017-01-10 PROCEDURE — 80069 RENAL FUNCTION PANEL: CPT

## 2017-01-10 PROCEDURE — 80158 DRUG ASSAY CYCLOSPORINE: CPT

## 2017-01-10 PROCEDURE — 85610 PROTHROMBIN TIME: CPT

## 2017-01-10 RX ORDER — EPINEPHRINE 1 MG/ML
1 INJECTION, SOLUTION INTRACARDIAC; INTRAMUSCULAR; INTRAVENOUS; SUBCUTANEOUS ONCE AS NEEDED
Status: DISPENSED | OUTPATIENT
Start: 2017-01-10 | End: 2017-01-10

## 2017-01-10 RX ORDER — DIPHENHYDRAMINE HCL 25 MG
25 CAPSULE ORAL ONCE
Status: COMPLETED | OUTPATIENT
Start: 2017-01-10 | End: 2017-01-10

## 2017-01-10 RX ORDER — TACROLIMUS 1 MG/1
8 CAPSULE ORAL EVERY MORNING
Status: DISCONTINUED | OUTPATIENT
Start: 2017-01-10 | End: 2017-01-11

## 2017-01-10 RX ORDER — VALGANCICLOVIR 450 MG/1
450 TABLET, FILM COATED ORAL DAILY
Status: DISCONTINUED | OUTPATIENT
Start: 2017-01-10 | End: 2017-01-16 | Stop reason: HOSPADM

## 2017-01-10 RX ORDER — MYCOPHENOLATE MOFETIL 250 MG/1
500 CAPSULE ORAL 2 TIMES DAILY
Status: DISCONTINUED | OUTPATIENT
Start: 2017-01-10 | End: 2017-01-16

## 2017-01-10 RX ORDER — DIPHENHYDRAMINE HCL 50 MG
50 CAPSULE ORAL ONCE AS NEEDED
Status: ACTIVE | OUTPATIENT
Start: 2017-01-10 | End: 2017-01-10

## 2017-01-10 RX ORDER — CINACALCET 30 MG/1
30 TABLET, FILM COATED ORAL
Status: DISCONTINUED | OUTPATIENT
Start: 2017-01-10 | End: 2017-01-12

## 2017-01-10 RX ORDER — TACROLIMUS 1 MG/1
7 CAPSULE ORAL EVERY EVENING
Status: DISCONTINUED | OUTPATIENT
Start: 2017-01-10 | End: 2017-01-11

## 2017-01-10 RX ORDER — NYSTATIN 100000 [USP'U]/ML
500000 SUSPENSION ORAL
Status: DISCONTINUED | OUTPATIENT
Start: 2017-01-10 | End: 2017-01-16 | Stop reason: HOSPADM

## 2017-01-10 RX ORDER — ACETAMINOPHEN 325 MG/1
650 TABLET ORAL ONCE
Status: COMPLETED | OUTPATIENT
Start: 2017-01-10 | End: 2017-01-10

## 2017-01-10 RX ORDER — LANOLIN ALCOHOL/MO/W.PET/CERES
400 CREAM (GRAM) TOPICAL DAILY
Status: DISCONTINUED | OUTPATIENT
Start: 2017-01-10 | End: 2017-01-16 | Stop reason: HOSPADM

## 2017-01-10 RX ORDER — NIFEDIPINE 60 MG/1
60 TABLET, EXTENDED RELEASE ORAL 2 TIMES DAILY
Status: DISCONTINUED | OUTPATIENT
Start: 2017-01-10 | End: 2017-01-10

## 2017-01-10 RX ORDER — NYSTATIN 500000 [USP'U]/1
500000 TABLET, COATED ORAL
Status: DISCONTINUED | OUTPATIENT
Start: 2017-01-10 | End: 2017-01-10

## 2017-01-10 RX ORDER — FAMOTIDINE 20 MG/1
20 TABLET, FILM COATED ORAL NIGHTLY
Status: DISCONTINUED | OUTPATIENT
Start: 2017-01-10 | End: 2017-01-16 | Stop reason: HOSPADM

## 2017-01-10 RX ORDER — OXYCODONE AND ACETAMINOPHEN 5; 325 MG/1; MG/1
1 TABLET ORAL EVERY 6 HOURS PRN
Status: DISCONTINUED | OUTPATIENT
Start: 2017-01-10 | End: 2017-01-16 | Stop reason: HOSPADM

## 2017-01-10 RX ORDER — SODIUM BICARBONATE 650 MG/1
1950 TABLET ORAL 2 TIMES DAILY
Status: DISCONTINUED | OUTPATIENT
Start: 2017-01-10 | End: 2017-01-16 | Stop reason: HOSPADM

## 2017-01-10 RX ORDER — ACETAMINOPHEN 325 MG/1
650 TABLET ORAL ONCE AS NEEDED
Status: ACTIVE | OUTPATIENT
Start: 2017-01-10 | End: 2017-01-10

## 2017-01-10 RX ORDER — EPINEPHRINE 1 MG/ML
INJECTION, SOLUTION INTRACARDIAC; INTRAMUSCULAR; INTRAVENOUS; SUBCUTANEOUS
Status: DISPENSED
Start: 2017-01-10 | End: 2017-01-11

## 2017-01-10 RX ORDER — LEVOTHYROXINE SODIUM 112 UG/1
112 TABLET ORAL
Status: DISCONTINUED | OUTPATIENT
Start: 2017-01-10 | End: 2017-01-16 | Stop reason: HOSPADM

## 2017-01-10 RX ORDER — METOPROLOL TARTRATE 25 MG/1
25 TABLET, FILM COATED ORAL 2 TIMES DAILY
Status: DISCONTINUED | OUTPATIENT
Start: 2017-01-10 | End: 2017-01-10

## 2017-01-10 RX ORDER — METOPROLOL TARTRATE 25 MG/1
12.5 TABLET ORAL 2 TIMES DAILY
Status: DISCONTINUED | OUTPATIENT
Start: 2017-01-10 | End: 2017-01-16 | Stop reason: HOSPADM

## 2017-01-10 RX ORDER — SULFAMETHOXAZOLE AND TRIMETHOPRIM 400; 80 MG/1; MG/1
1 TABLET ORAL DAILY
Status: DISCONTINUED | OUTPATIENT
Start: 2017-01-10 | End: 2017-01-16 | Stop reason: HOSPADM

## 2017-01-10 RX ORDER — HEPARIN SODIUM 5000 [USP'U]/ML
5000 INJECTION, SOLUTION INTRAVENOUS; SUBCUTANEOUS EVERY 8 HOURS
Status: DISCONTINUED | OUTPATIENT
Start: 2017-01-10 | End: 2017-01-16 | Stop reason: HOSPADM

## 2017-01-10 RX ADMIN — FAMOTIDINE 20 MG: 20 TABLET, FILM COATED ORAL at 09:01

## 2017-01-10 RX ADMIN — NYSTATIN 500000 UNITS: 500000 SUSPENSION ORAL at 09:01

## 2017-01-10 RX ADMIN — ACETAMINOPHEN 650 MG: 325 TABLET ORAL at 02:01

## 2017-01-10 RX ADMIN — DIBASIC SODIUM PHOSPHATE, MONOBASIC POTASSIUM PHOSPHATE AND MONOBASIC SODIUM PHOSPHATE 2 TABLET: 852; 155; 130 TABLET ORAL at 09:01

## 2017-01-10 RX ADMIN — DIBASIC SODIUM PHOSPHATE, MONOBASIC POTASSIUM PHOSPHATE AND MONOBASIC SODIUM PHOSPHATE 2 TABLET: 852; 155; 130 TABLET ORAL at 05:01

## 2017-01-10 RX ADMIN — THERA TABS 1 TABLET: TAB at 08:01

## 2017-01-10 RX ADMIN — DEXTROSE: 50 INJECTION, SOLUTION INTRAVENOUS at 01:01

## 2017-01-10 RX ADMIN — SULFAMETHOXAZOLE AND TRIMETHOPRIM 1 TABLET: 400; 80 TABLET ORAL at 08:01

## 2017-01-10 RX ADMIN — Medication 3 ML: at 02:01

## 2017-01-10 RX ADMIN — Medication 12.5 MG: at 09:01

## 2017-01-10 RX ADMIN — ANTI-THYMOCYTE GLOBULIN (RABBIT) 100 MG: 5 INJECTION, POWDER, LYOPHILIZED, FOR SOLUTION INTRAVENOUS at 03:01

## 2017-01-10 RX ADMIN — VALGANCICLOVIR 450 MG: 450 TABLET, FILM COATED ORAL at 08:01

## 2017-01-10 RX ADMIN — SODIUM BICARBONATE 650 MG TABLET 1950 MG: at 09:01

## 2017-01-10 RX ADMIN — TACROLIMUS 8 MG: 1 CAPSULE, GELATIN COATED ORAL at 08:01

## 2017-01-10 RX ADMIN — MYCOPHENOLATE MOFETIL 500 MG: 250 CAPSULE ORAL at 09:01

## 2017-01-10 RX ADMIN — TACROLIMUS 7 MG: 1 CAPSULE, GELATIN COATED ORAL at 06:01

## 2017-01-10 RX ADMIN — MYCOPHENOLATE MOFETIL 500 MG: 250 CAPSULE ORAL at 08:01

## 2017-01-10 RX ADMIN — LEVOTHYROXINE SODIUM 112 MCG: 112 TABLET ORAL at 05:01

## 2017-01-10 RX ADMIN — SODIUM BICARBONATE 650 MG TABLET 1950 MG: at 08:01

## 2017-01-10 RX ADMIN — Medication 3 ML: at 10:01

## 2017-01-10 RX ADMIN — DIPHENHYDRAMINE HYDROCHLORIDE 25 MG: 25 CAPSULE ORAL at 02:01

## 2017-01-10 RX ADMIN — NIFEDIPINE 60 MG: 60 TABLET, FILM COATED, EXTENDED RELEASE ORAL at 08:01

## 2017-01-10 RX ADMIN — METOPROLOL TARTRATE 25 MG: 25 TABLET ORAL at 08:01

## 2017-01-10 RX ADMIN — DIBASIC SODIUM PHOSPHATE, MONOBASIC POTASSIUM PHOSPHATE AND MONOBASIC SODIUM PHOSPHATE 2 TABLET: 852; 155; 130 TABLET ORAL at 01:01

## 2017-01-10 RX ADMIN — CINACALCET HYDROCHLORIDE 30 MG: 30 TABLET, COATED ORAL at 08:01

## 2017-01-10 RX ADMIN — MAGNESIUM OXIDE TAB 400 MG (241.3 MG ELEMENTAL MG) 400 MG: 400 (241.3 MG) TAB at 08:01

## 2017-01-10 NOTE — PROGRESS NOTES
While doing vitals, saw pts home percocet sitting on bedside table.  Asked pt if he took any.  Stated he took 2 around midnight.  Placed meds up in pt belongings.  Explained to pt to not take any meds besides the ones that we provide and to call if needing any prn meds.  Stated understanding.  Dr cosby informed

## 2017-01-10 NOTE — PROGRESS NOTES
Pt to [facility] independently ambulatory from admit office with wife.  Denies pain. Dr Carcamo informed.  Ok'd to have pt take home meds now as it may take some time for her to come see pt.  No PIV for now due to possible line placement for thymo for pt's ktx rejection.  Vitals obtained.  Food provided.  Call bell, phone, drink, urinal within reach.  Pt aware to call if needing assistance.

## 2017-01-10 NOTE — PROGRESS NOTES
Admit Note/Discharge Note    Met with patient to assess needs. Patient is a 66 y.o. 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     Patient admitted from home on 1/9/2017 .  At this time, patient presents as alert and oriented x 4, pleasant, good eye contact, well groomed, recall good, concentration/judgement good, average intelligence, calm, communicative, cooperative and asking and answering questions appropriately. Pt presents alone at this time.     Household/Family Systems     Patient resides with patient's significant other, at [address] [place] [address].  Support system includes s/o Jayden Winston for 24 yrs, daughters Trixie and Yohannes and 3 supportive siblings: Pablo ,Sadiq, and Salma.  Patient does not have dependents that are need of being cared for.     Patients primary caregiver is Jayden Winston, patients significant other, phone number [phone] and pt reported daughter Yohannes will also assist [phone].  Confirmed patients contact information is [phone] (home);     Telephone Information:   Mobile [phone]   .    During admission, patient's caregiver at home Confirmed patient and patients caregivers do have access to reliable transportation.    Cognitive Status/Learning     Patient reports reading ability as college and states patient does have difficulty with seeing and wears trifocals.  Patient reports patient learns best by a combination of verbal, written, and hands-on instruction.     Needed: No.   Highest education level: Associate/Bachelor Degree    Vocation/Disability   .  Working for Income: No  If no, reason not working: Disability  Patient is disabled due to ESRD since 6/12/2010.  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    Adherence     Patient reports a high level of adherence to patients health care regimen.  Adherence counseling and education  provided.  Patient's caregiver verbalizes understanding.    Substance Use    Patient reports the following substance usage.    Tobacco: Pt is a former smoker of 40yrs.   Alcohol: Pt reports quitting in .  Illicit Drugs/Non-prescribed Medications: Pt reports smoking marijuana in his teens.  Patient states clear understanding of the potential impact of substance use.  Substance abstinence/cessation counseling, education and resources provided and reviewed.     Services Utilizing/ADLS    Infusion Service: Prior to admission, patient utilizing? no  Home Health: Prior to admission, patient utilizing? no  DME: Prior to admission, yes BP cuff  Pulmonary/Cardiac Rehab: Prior to admission, no  Dialysis:  Prior to admission, yes  Transplant Specialty Pharmacy:  Prior to admission, pt was using [facility].     Prior to admission, patient reports patient was independent with ADLS and was driving.  Patient reports patient is able to care for self at this time.  Patient indicates a willingness to care for self once medically cleared to do so.    Insurance/Medications    Insured by   Payor/Plan Subscr  Sex Relation Sub. Ins. ID Effective Group Num   1. MEDICARE - ME* TANG FALK 1950 Male  [id number] 6/1/10                                    [address]   2. [employer] CROSS * TANG FALK 1950 Male  [id number] 11/1/15 [id number]                                   [address] [address]      Primary Insurance (for UNOS reporting): Public Insurance - Medicare FFS (Fee For Service)  Secondary Insurance (for UNOS reporting): Private Insurance    Patient reports patient is able to obtain and afford medications at this time and at time of discharge    Living Will/Healthcare Power of     Patient states patient does not have a LW and/or HCPA.      Coping/Mental Health    Patient is coping adequately with post transplant process.  Patient denies mental health difficulties.     Discharge Planning    At time of discharge,  patient plans to return to patient's home and s/o and daughter are still caring for him.  Patients will transport patient.  Per rounds today, expected discharge date has not been medically determined at this time. Patient verbalize understanding and is involved in treatment planning and discharge process.     Additional Concerns    Patient denies additional needs and/or concerns at this time. Patient is being followed for needs, education, resources, information, emotional support, supportive counseling, and for supportive and skilled discharge plan of care.  Patient verbalizes understanding and agreement with information reviewed, social work availability, and how to access available resources as needed.  remains available.

## 2017-01-10 NOTE — PLAN OF CARE
Problem: Patient Care Overview  Goal: Individualization & Mutuality  Outcome: Ongoing (interventions implemented as appropriate)  Pt aao x 4. Patient currently in bed, in lowest position, locked, and call light within reach. Right peripheral 20 G IV inserted in patient's forearm, tolerated well. Patient began receiving 1st dose of Thymo at 1500. VSS. Patient complains of no pain at this time. Patient is strict I/O, patient's current output 1140. Nifedipine has been D/C, and metoprolol 12.5 mg split tab to begin tonight at 2100.      Will continue to monitor, assess, and adjust care as needed.

## 2017-01-10 NOTE — H&P
[facility]  History & Physical  General Surgery    SUBJECTIVE:     Chief Complaint/Reason for Admission: Kidney rejection    History of Present Illness:  Patient is a 66 y.o. male s/p kidney transplant in 11/26/2016 who presents to F with a biopsy showing signs of acute rejection. Patient states he has not felt any differently but that his labs showed an elevated creatinine. He states he was still urinating well. He was tolerating a regular diet and denies any nausea or vomiting, fevers or chills.     Facility-Administered Medications Prior to Admission   Medication    loperamide capsule 2 mg     PTA Medications   Medication Sig    cinacalcet (SENSIPAR) 30 MG Tab Take 1 tablet (30 mg total) by mouth daily with breakfast.    famotidine (PEPCID) 20 MG tablet Take 1 tablet (20 mg total) by mouth every evening.    k phos di & mono-sod phos mono (K-PHOS-NEUTRAL) 250 mg Tab Take 2 tablets by mouth 3 (three) times daily.    levothyroxine (SYNTHROID) 112 MCG tablet Take 1 tablet (112 mcg total) by mouth once daily.    magnesium oxide (MAG-OX) 400 mg tablet Take 1 tablet (400 mg total) by mouth once daily.    metoprolol tartrate (LOPRESSOR) 25 MG tablet Take 1 tablet (25 mg total) by mouth 2 (two) times daily.    multivitamin (ONE DAILY MULTIVITAMIN) per tablet Take 1 tablet by mouth once daily.    mycophenolate (CELLCEPT) 250 mg Cap Take 2 capsules (500 mg total) by mouth 2 (two) times daily. Z94.0/Kidney Transplant on 11/26/16    nifedipine (PROCARDIA-XL) 60 MG (OSM) 24 hr tablet Take 1 tablet (60 mg total) by mouth 2 (two) times daily.    oxycodone-acetaminophen (PERCOCET) 5-325 mg per tablet Take 1 tablet by mouth every 6 (six) hours as needed.    sodium bicarbonate 650 MG tablet Take 3 tablets (1,950 mg total) by mouth 2 (two) times daily.    sulfamethoxazole-trimethoprim 400-80mg (BACTRIM,SEPTRA) 400-80 mg per tablet Take 1 tablet by mouth once daily. Stop: 11/26/17    tacrolimus  (PROGRAF) 1 MG Cap Take 8 capsules (8mg total) in AM & 7 capsules (7mg total) in PM by mouth daily. Z94.0/Kidney Transplant on 11/26/16    valganciclovir (VALCYTE) 450 mg Tab Take 1 tablet (450 mg total) by mouth once daily. Stop: 2/24/17       Review of patient's allergies indicates:  No Known Allergies    Past Medical History   Diagnosis Date    Acidosis     Adrenal adenoma     Anemia associated with chronic renal failure     Arrhythmia, onset 1995 5/1/2015    Awaiting organ transplant status 11/26/2013    Basal cell carcinoma 06/12/2012     left nasal tip    Blood type B+ 11/26/2013    Cancer     Celiac artery dissection     Chronic diarrhea     Chronic urethral stricture     Congenital absence of kidney      left    Dissecting aortic aneurysm (any part), abdominal     Encounter for blood transfusion     ESRD (end stage renal disease) 06/16/2010    H/O: urethral stricture     History of AAA (abdominal aortic aneurysm) repair     Hypertension     Hypokalemia     Hypothyroidism 1/10/2014    Inguinal hernia bilateral, non-recurrent     Kidney stones     Organ transplant candidate 11/26/2013    Plantar warts 1/10/2014    S/P kidney transplant     Secondary hyperparathyroidism, renal     Thyroid disease      Past Surgical History   Procedure Laterality Date    Gastrojejunostomy      Hernia repair      Hemorrhoid surgery      Aorta - superior mesenteric artery bypass graft      Percutaneous nephrolithotripsy      Right eswl  6/26/12    Right  eswl  10/31/12    Bladder neck reconstruction      Abdominal surgery       exploratory lapatomy x 2    Bladder surgery      Cystoscopy      Lithotripsy       Family History   Problem Relation Age of Onset    Diabetes Mother     Cancer Brother      thyroid cancer    Stroke Maternal Aunt     Kidney disease Paternal Uncle     Kidney disease Cousin     Melanoma Neg Hx     Psoriasis Neg Hx     Lupus Neg Hx     Eczema Neg Hx      Social  History   Substance Use Topics    Smoking status: Former Smoker     Years: 40.00     Types: Cigarettes     Quit date: 6/16/2010    Smokeless tobacco: Never Used    Alcohol use No      Comment: stopped ETOH in 2010        Review of Systems:  Constitutional: no fever or chills  Respiratory: no cough or shortness of breath  Cardiovascular: no chest pain or palpitations  Gastrointestinal: no nausea or vomiting, tolerating diet  Genitourinary: no hematuria or dysuria  Musculoskeletal: no arthralgias or myalgias    OBJECTIVE:     Vital Signs (Most Recent):  Temp: 98.2 °F (36.8 °C) (01/09/17 2045)  Pulse: (!) 57 (01/10/17 0100)  Resp: 18 (01/09/17 2045)  BP: 113/76 (01/09/17 2045)  SpO2: 100 % (01/09/17 2045)    Physical Exam:  General: well developed, well nourished, no distress  Head: normocephalic, atraumatic  Lungs:  clear to auscultation bilaterally and normal respiratory effort  Heart: regular rate and rhythm, S1, S2 normal, no murmur, rub or gallop  Abdomen: Soft, non-tender, non-distended, transplant scar healing well  Extremities: no cyanosis or edema, or clubbing    Laboratory:  CBC:   Recent Labs  Lab 01/09/17 2129   WBC 4.37   RBC 3.90*   HGB 12.0*   HCT 35.2*      MCV 90   MCH 30.8   MCHC 34.1     CMP:   Recent Labs  Lab 01/09/17  0738 01/09/17 2129   GLU 93 108   CALCIUM 8.6* 8.3*   ALBUMIN 3.8  3.8 3.8   PROT 7.0  --     138   K 4.1 4.3   CO2 23 22*    109   BUN 26* 33*   CREATININE 1.4 1.9*   ALKPHOS 101  --    *  --    AST 47*  --    BILITOT 0.4  --        Diagnostic Results:  Labs: Reviewed    ASSESSMENT/PLAN:     67 yo male s/p OLTx on 11/26/16 who presents with rejection    Plan:   - Admit to kidney transplant medicine  - Will consent patient for central line placement  - Reorder home meds  - NPO  - MIVF  - Discussed with kidney transplant staff on call    Sarah Carcamo MD  PGY2  [phone]

## 2017-01-10 NOTE — CONSULTS
Consult Note  Kidney Transplant      Consult Requested By: Andrew Lopez Jr., MD  Reason for Consult: Assessment of  donor kidney transplant recipient and management of immunosuppression.    SUBJECTIVE:     History of Present Illness:   Mr. Burkett is a 66 y.o. year old male who is status post  donor kidney transplant on 16 for HTN.  His maintenance immunosuppression consists of mycophenolate mofetil and tacrolimus. Pt recently admitted for DIVINE with suspected rejection; however, labs on admit  showed improvement of Cr from 2.6 to 1.6. Pt did receive 1 dose of SMP after labs drawn. A decision was made to bx pt  and d/c. Bx revealed AVR and pt was readmitted  to begin tx with thymo x 5-7 doses. DSA  neg.     Follow up: Pt is doing well this morning. He was unable to receive first dose of thymo overnight due to inability to place central line. Will proceed today with peripheral thymo. CMV pending.     Review of Systems:  General: no weakness, fatigue or lethargy  Eyes: no visual disturbance, blurry vision or double vision  Ears/Nose/Throat: no hearing loss, nasal congestion, sinus congestion or sore throat   Respiratory: no cough, shortness of breath or chest congestion  Cardiovascular: no palpitations, chest pain, dynpnea on exertion, orthopnea, PND or syncope  Gastrointestinal: no nausea, vomiting, abdominal pain, diarrhea, melena or bright red bleeding per rectum  Genitourinary: no burning, frequency, urgency, hematuria or urethral discharge  Musculoskeletal: no myalgias, arthralgias, back pain or limitation of joint movements  Psychiatric: no hallucinations, delusions or suicidal ideation  Neurological: no confusion, focal weakness, numbness or paresthesias.  Hematological: no bruising or lymphadenopathy  Skin: no rash, itching or ulceration    Past Medical and Surgical History: Mr. Burkett has a past medical history of Acidosis; Adrenal adenoma; Anemia associated with chronic  "renal failure; Arrhythmia, onset 1995 (5/1/2015); Awaiting organ transplant status (11/26/2013); Basal cell carcinoma (06/12/2012); Blood type B+ (11/26/2013); Cancer; Celiac artery dissection; Chronic diarrhea; Chronic urethral stricture; Congenital absence of kidney; Dissecting aortic aneurysm (any part), abdominal; Encounter for blood transfusion; ESRD (end stage renal disease) (06/16/2010); H/O: urethral stricture; History of AAA (abdominal aortic aneurysm) repair; Hypertension; Hypokalemia; Hypothyroidism (1/10/2014); Inguinal hernia bilateral, non-recurrent; Kidney stones; Organ transplant candidate (11/26/2013); Plantar warts (1/10/2014); S/P kidney transplant; Secondary hyperparathyroidism, renal; and Thyroid disease.  He has a past surgical history that includes Gastrojejunostomy; Hernia repair; Hemorrhoid surgery; Aorta - superior mesenteric artery bypass graft; Percutaneous nephrolithotripsy; right ESWL (6/26/12); right  ESWL (10/31/12); Bladder neck reconstruction; Abdominal surgery; Bladder surgery; Cystoscopy; and Lithotripsy.    Past Social and Family History: Mr. Burkett reports that he quit smoking about 6 years ago. His smoking use included Cigarettes. He quit after 40.00 years of use. He has never used smokeless tobacco. He reports that he does not drink alcohol or use illicit drugs.  His family history includes Cancer in his brother; Diabetes in his mother; Kidney disease in his cousin and paternal uncle; Stroke in his maternal aunt. There is no history of Melanoma, Psoriasis, Lupus, or Eczema.    OBJECTIVE:     Vital Signs (Most Recent)  Blood pressure 112/71, pulse 73, temperature 98.4 °F (36.9 °C), temperature source Oral, resp. rate 16, height 5' 11" (1.803 m), weight 59.6 kg (131 lb 6.3 oz), SpO2 100 %.    Physical Exam:  Constitutional: He is oriented to person, place, and time. He is well-developed and well-nourished, in no apparent distress.  HEENT: Normocephalic and atraumatic.  EOM are " normal.  Pupils are equal, round, and reactive to light.  No scleral icterus.  Neck: Normal range of motion.  No JVD present.  No thyromegaly present.   Heart: Normal rate and regular rhythm.  No gallop and no friction rub.  No murmur heard.  Chest: Effort normal and breath sounds normal.  No respiratory distress.  No rales or ronchi.  Abdominal: Soft.   Bowel sounds are normal.  He exhibits no distension and no mass.  There is no tenderness.  There is no guarding.   Musculoskeletal: Normal range of motion.  He exhibits no edema and no tenderness.  Lymphadenopathy: No cervical, axillary or inguinal lymphadenopathy.  Neurological: No focal weakness, numbness or paresthesias.  No cranial nerve deficit.   Skin: Skin is warm and dry.  No rash noted.  No pallor.   Psychiatric: He has normal mood, affect and behaviors.    Laboratory:    Recent Labs  Lab 01/10/17  0503   WBC 3.57*   RBC 3.73*   HGB 11.5*   HCT 34.1*      MCV 91   MCH 30.8   MCHC 33.7       Recent Labs  Lab 01/09/17  0738  01/10/17  0503   CALCIUM 8.6*  < > 8.3*   PROT 7.0  --   --      < > 139   K 4.1  < > 4.4   CO2 23  < > 21*     < > 110   BUN 26*  < > 30*   CREATININE 1.4  < > 1.6*   PHOS 2.0*  < > 2.6*   ALKPHOS 101  --   --    *  --   --    AST 47*  --   --    BILITOT 0.4  --   --    < > = values in this interval not displayed.    Recent Labs  Lab 01/10/17  0503   TACROLIMUS 8.8   CYCLOSPORINE <10*       Imaging Results:  None    ASSESSMENT/PLAN:   1. S/p kidney txp 11/26/16 for HTN: Cr elevated, pt reports good UOP. Bx 1/6 with AVR. DSAs neg. Plan for thymo. Monitor.   2. 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 Plan for peripheral thymo x 5-7 doses, #1 today. Check CD3 after third dose. Monitor.   3. Management of immunosuppressants: Cont prograf and cellcept. Monitor for toxicities and adjust dose accordingly. Cont valcyte, bactrim and nystatin for opportunistic infection prophylaxis. CMV pending.   4. Hx of HTN:  cont metoprolol. Monitor.   5. Acidosis: cont bicarb. Monitor.   6. Hypomagnesemia: Cont mg ox. Monitor.   7. GI prophylaxis: cont H2 blocker. Monitor.   8. DVT prophylaxis: subq heparin.   9. Dispo: Not a candidate at this time.

## 2017-01-11 LAB
ALBUMIN SERPL BCP-MCNC: 3.5 G/DL
ANION GAP SERPL CALC-SCNC: 7 MMOL/L
BACTERIA #/AREA URNS AUTO: ABNORMAL /HPF
BACTERIA UR CULT: NO GROWTH
BASOPHILS # BLD AUTO: 0.01 K/UL
BASOPHILS NFR BLD: 0.2 %
BILIRUB UR QL STRIP: NEGATIVE
BUN SERPL-MCNC: 28 MG/DL
CALCIUM SERPL-MCNC: 8.3 MG/DL
CHLORIDE SERPL-SCNC: 110 MMOL/L
CLARITY UR REFRACT.AUTO: CLEAR
CO2 SERPL-SCNC: 20 MMOL/L
COLOR UR AUTO: YELLOW
CREAT SERPL-MCNC: 1.3 MG/DL
DIFFERENTIAL METHOD: ABNORMAL
EOSINOPHIL # BLD AUTO: 0 K/UL
EOSINOPHIL NFR BLD: 0 %
ERYTHROCYTE [DISTWIDTH] IN BLOOD BY AUTOMATED COUNT: 16.5 %
EST. GFR  (AFRICAN AMERICAN): >60 ML/MIN/1.73 M^2
EST. GFR  (NON AFRICAN AMERICAN): 57.3 ML/MIN/1.73 M^2
GLUCOSE SERPL-MCNC: 100 MG/DL
GLUCOSE UR QL STRIP: NEGATIVE
HCT VFR BLD AUTO: 33.9 %
HGB BLD-MCNC: 11.7 G/DL
HGB UR QL STRIP: NEGATIVE
KETONES UR QL STRIP: NEGATIVE
LEUKOCYTE ESTERASE UR QL STRIP: ABNORMAL
LYMPHOCYTES # BLD AUTO: 0.1 K/UL
LYMPHOCYTES NFR BLD: 1.6 %
MAGNESIUM SERPL-MCNC: 1.7 MG/DL
MCH RBC QN AUTO: 30.8 PG
MCHC RBC AUTO-ENTMCNC: 34.5 %
MCV RBC AUTO: 89 FL
MICROSCOPIC COMMENT: ABNORMAL
MONOCYTES # BLD AUTO: 0.1 K/UL
MONOCYTES NFR BLD: 2.2 %
NEUTROPHILS # BLD AUTO: 4.8 K/UL
NEUTROPHILS NFR BLD: 95.4 %
NITRITE UR QL STRIP: NEGATIVE
PH UR STRIP: 6 [PH] (ref 5–8)
PHOSPHATE SERPL-MCNC: 2.3 MG/DL
PLATELET # BLD AUTO: 132 K/UL
PMV BLD AUTO: 10.3 FL
POTASSIUM SERPL-SCNC: 4.4 MMOL/L
PROT UR QL STRIP: ABNORMAL
RBC # BLD AUTO: 3.8 M/UL
RBC #/AREA URNS AUTO: 1 /HPF (ref 0–4)
SODIUM SERPL-SCNC: 137 MMOL/L
SP GR UR STRIP: 1.01 (ref 1–1.03)
SQUAMOUS #/AREA URNS AUTO: 1 /HPF
TACROLIMUS BLD-MCNC: 8.2 NG/ML
URN SPEC COLLECT METH UR: ABNORMAL
UROBILINOGEN UR STRIP-ACNC: NEGATIVE EU/DL
WBC # BLD AUTO: 4.99 K/UL
WBC #/AREA URNS AUTO: 5 /HPF (ref 0–5)

## 2017-01-11 PROCEDURE — 25000003 PHARM REV CODE 250: Performed by: NURSE PRACTITIONER

## 2017-01-11 PROCEDURE — 63600175 PHARM REV CODE 636 W HCPCS: Performed by: STUDENT IN AN ORGANIZED HEALTH CARE EDUCATION/TRAINING PROGRAM

## 2017-01-11 PROCEDURE — 87077 CULTURE AEROBIC IDENTIFY: CPT

## 2017-01-11 PROCEDURE — 80069 RENAL FUNCTION PANEL: CPT

## 2017-01-11 PROCEDURE — 20600001 HC STEP DOWN PRIVATE ROOM

## 2017-01-11 PROCEDURE — 85025 COMPLETE CBC W/AUTO DIFF WBC: CPT

## 2017-01-11 PROCEDURE — 25000003 PHARM REV CODE 250: Performed by: STUDENT IN AN ORGANIZED HEALTH CARE EDUCATION/TRAINING PROGRAM

## 2017-01-11 PROCEDURE — 63600175 PHARM REV CODE 636 W HCPCS: Performed by: PHYSICIAN ASSISTANT

## 2017-01-11 PROCEDURE — 80197 ASSAY OF TACROLIMUS: CPT

## 2017-01-11 PROCEDURE — 81001 URINALYSIS AUTO W/SCOPE: CPT

## 2017-01-11 PROCEDURE — 36415 COLL VENOUS BLD VENIPUNCTURE: CPT

## 2017-01-11 PROCEDURE — 63600175 PHARM REV CODE 636 W HCPCS: Performed by: INTERNAL MEDICINE

## 2017-01-11 PROCEDURE — 87086 URINE CULTURE/COLONY COUNT: CPT

## 2017-01-11 PROCEDURE — 99233 SBSQ HOSP IP/OBS HIGH 50: CPT | Mod: 24,,, | Performed by: PHYSICIAN ASSISTANT

## 2017-01-11 PROCEDURE — 87088 URINE BACTERIA CULTURE: CPT

## 2017-01-11 PROCEDURE — 87186 SC STD MICRODIL/AGAR DIL: CPT

## 2017-01-11 PROCEDURE — 25000003 PHARM REV CODE 250: Performed by: PHYSICIAN ASSISTANT

## 2017-01-11 PROCEDURE — 83735 ASSAY OF MAGNESIUM: CPT

## 2017-01-11 PROCEDURE — 87040 BLOOD CULTURE FOR BACTERIA: CPT

## 2017-01-11 RX ORDER — TACROLIMUS 1 MG/1
8 CAPSULE ORAL 2 TIMES DAILY
Status: DISCONTINUED | OUTPATIENT
Start: 2017-01-11 | End: 2017-01-12

## 2017-01-11 RX ORDER — DIPHENHYDRAMINE HCL 25 MG
25 CAPSULE ORAL ONCE
Status: DISCONTINUED | OUTPATIENT
Start: 2017-01-11 | End: 2017-01-12

## 2017-01-11 RX ORDER — ACETAMINOPHEN 325 MG/1
650 TABLET ORAL ONCE AS NEEDED
Status: ACTIVE | OUTPATIENT
Start: 2017-01-11 | End: 2017-01-11

## 2017-01-11 RX ORDER — EPINEPHRINE 1 MG/ML
1 INJECTION, SOLUTION INTRACARDIAC; INTRAMUSCULAR; INTRAVENOUS; SUBCUTANEOUS ONCE AS NEEDED
Status: ACTIVE | OUTPATIENT
Start: 2017-01-11 | End: 2017-01-11

## 2017-01-11 RX ORDER — ACETAMINOPHEN 325 MG/1
650 TABLET ORAL ONCE
Status: DISCONTINUED | OUTPATIENT
Start: 2017-01-11 | End: 2017-01-12

## 2017-01-11 RX ORDER — DIPHENHYDRAMINE HCL 50 MG
50 CAPSULE ORAL ONCE AS NEEDED
Status: ACTIVE | OUTPATIENT
Start: 2017-01-11 | End: 2017-01-11

## 2017-01-11 RX ADMIN — NYSTATIN 500000 UNITS: 500000 SUSPENSION ORAL at 05:01

## 2017-01-11 RX ADMIN — Medication 12.5 MG: at 09:01

## 2017-01-11 RX ADMIN — TACROLIMUS 8 MG: 1 CAPSULE, GELATIN COATED ORAL at 08:01

## 2017-01-11 RX ADMIN — THERA TABS 1 TABLET: TAB at 08:01

## 2017-01-11 RX ADMIN — LEVOTHYROXINE SODIUM 112 MCG: 112 TABLET ORAL at 05:01

## 2017-01-11 RX ADMIN — OXYCODONE HYDROCHLORIDE AND ACETAMINOPHEN 1 TABLET: 5; 325 TABLET ORAL at 12:01

## 2017-01-11 RX ADMIN — MAGNESIUM OXIDE TAB 400 MG (241.3 MG ELEMENTAL MG) 400 MG: 400 (241.3 MG) TAB at 08:01

## 2017-01-11 RX ADMIN — SODIUM BICARBONATE 650 MG TABLET 1950 MG: at 09:01

## 2017-01-11 RX ADMIN — Medication 3 ML: at 02:01

## 2017-01-11 RX ADMIN — OXYCODONE HYDROCHLORIDE AND ACETAMINOPHEN 1 TABLET: 5; 325 TABLET ORAL at 06:01

## 2017-01-11 RX ADMIN — SODIUM BICARBONATE 650 MG TABLET 1950 MG: at 08:01

## 2017-01-11 RX ADMIN — NYSTATIN 500000 UNITS: 500000 SUSPENSION ORAL at 08:01

## 2017-01-11 RX ADMIN — FAMOTIDINE 20 MG: 20 TABLET, FILM COATED ORAL at 09:01

## 2017-01-11 RX ADMIN — MYCOPHENOLATE MOFETIL 500 MG: 250 CAPSULE ORAL at 09:01

## 2017-01-11 RX ADMIN — Medication 12.5 MG: at 12:01

## 2017-01-11 RX ADMIN — DIBASIC SODIUM PHOSPHATE, MONOBASIC POTASSIUM PHOSPHATE AND MONOBASIC SODIUM PHOSPHATE 2 TABLET: 852; 155; 130 TABLET ORAL at 05:01

## 2017-01-11 RX ADMIN — Medication 3 ML: at 06:01

## 2017-01-11 RX ADMIN — SULFAMETHOXAZOLE AND TRIMETHOPRIM 1 TABLET: 400; 80 TABLET ORAL at 08:01

## 2017-01-11 RX ADMIN — NYSTATIN 500000 UNITS: 500000 SUSPENSION ORAL at 12:01

## 2017-01-11 RX ADMIN — DIBASIC SODIUM PHOSPHATE, MONOBASIC POTASSIUM PHOSPHATE AND MONOBASIC SODIUM PHOSPHATE 2 TABLET: 852; 155; 130 TABLET ORAL at 02:01

## 2017-01-11 RX ADMIN — TACROLIMUS 8 MG: 1 CAPSULE, GELATIN COATED ORAL at 05:01

## 2017-01-11 RX ADMIN — DEXTROSE: 50 INJECTION, SOLUTION INTRAVENOUS at 12:01

## 2017-01-11 RX ADMIN — Medication 3 ML: at 10:01

## 2017-01-11 RX ADMIN — OXYCODONE HYDROCHLORIDE AND ACETAMINOPHEN 1 TABLET: 5; 325 TABLET ORAL at 05:01

## 2017-01-11 RX ADMIN — DIBASIC SODIUM PHOSPHATE, MONOBASIC POTASSIUM PHOSPHATE AND MONOBASIC SODIUM PHOSPHATE 2 TABLET: 852; 155; 130 TABLET ORAL at 09:01

## 2017-01-11 RX ADMIN — VALGANCICLOVIR 450 MG: 450 TABLET, FILM COATED ORAL at 08:01

## 2017-01-11 RX ADMIN — MYCOPHENOLATE MOFETIL 500 MG: 250 CAPSULE ORAL at 08:01

## 2017-01-11 RX ADMIN — CINACALCET HYDROCHLORIDE 30 MG: 30 TABLET, COATED ORAL at 08:01

## 2017-01-11 RX ADMIN — NYSTATIN 500000 UNITS: 500000 SUSPENSION ORAL at 09:01

## 2017-01-11 NOTE — PLAN OF CARE
Problem: Patient Care Overview  Goal: Individualization & Mutuality  Outcome: Ongoing (interventions implemented as appropriate)    Pt aao x 4. Patient currently in bed, in lowest position, locked, and call light within reach. Patient free from injuries/falls this shift. D/C TELE today, patient showered self. VSS with the exception of afternoon temp of 100.5. Patient did NOT receive 2nd dose of Thymo today per MD order, due to patient's temp of 100.5. Patient notified and stated he understood reasoning of why he would not receive Thymo today. Patient has received Percocet 5 mg for pain this shift. Blood cultures x 2 done this afternoon, results pending. UA and culture specimen collected, results pending. Prograf dosage has been updated to 8 mg for morning and night administration.        Will continue to monitor, assess, and adjust care as needed.

## 2017-01-11 NOTE — PROGRESS NOTES
Progress Note  Transplant Surgery    Admit Date: 2017  Post-operative Day: 46  Hospital Day: 3    ORGAN: LEFT KIDNEY    Disease Etiology: Hypertensive Nephrosclerosis  Donor Type:  - Brain Death    CDC High Risk: Yes    Donor CMV Status: Positive  Donor CMV Status:   Donor HBcAB: Negative    Donor HCV Status: Negative    Whole or Partial:   Biliary Anastomosis:   Arterial Anatomy:     SUBJECTIVE:   HPI: Mr. Burkett is a 66 y.o. year old male who is status post  donor kidney transplant on 16 for HTN.  His maintenance immunosuppression consists of mycophenolate mofetil and tacrolimus. Pt recently admitted for DIVINE with suspected rejection; however, labs on admit  showed improvement of Cr from 2.6 to 1.6. Pt did receive 1 dose of SMP after labs drawn. A decision was made to bx pt  and d/c. Bx revealed AVR and pt was readmitted  to begin tx with thymo x 5-7 doses. DSA  neg.      Follow up: No acute events overnight. Received thymo #1 yest, tolerated well. Cr 1.3 from 1.6 yest. Pt with temp 100.5 prior to second dose today, with tachycardia. Blood and urine cultures sent. Will hold dose #2 and resume tomorrow.           Follow-up For: * No surgery found *    Review of patient's allergies indicates:  No Known Allergies    Review of Systems   Constitutional: Negative for appetite change, chills, diaphoresis, fatigue and fever.   Respiratory: Negative for cough, shortness of breath and wheezing.    Cardiovascular: Negative for chest pain, palpitations and leg swelling.   Gastrointestinal: Negative for abdominal distention, abdominal pain, constipation, diarrhea, nausea and vomiting.   Genitourinary: Negative for decreased urine volume, dysuria, frequency and urgency.   Allergic/Immunologic: Positive for immunocompromised state.   Neurological: Negative for dizziness, weakness, numbness and headaches.     OBJECTIVE:     Vital Signs (Most Recent)  Temp: (!) 100.5 °F (38.1 °C) (17  1330)  Pulse: 100 (01/11/17 1230)  Resp: 20 (01/11/17 1230)  BP: 135/86 (01/11/17 1230)  SpO2: 100 % (01/11/17 1230)    Vital Signs Range (Last 24H):  Temp:  [98.1 °F (36.7 °C)-100.5 °F (38.1 °C)]   Pulse:  []   Resp:  [16-20]   BP: (102-135)/(60-86)   SpO2:  [96 %-100 %]     I & O (Last 24H):  Intake/Output Summary (Last 24 hours) at 01/11/17 1524  Last data filed at 01/11/17 1500   Gross per 24 hour   Intake             1920 ml   Output             2915 ml   Net             -995 ml     Physical Exam   Constitutional: He is oriented to person, place, and time. He appears well-developed and well-nourished.   HENT:   Head: Normocephalic and atraumatic.   Eyes: EOM are normal.   Neck: Normal range of motion. Neck supple.   Cardiovascular: Normal rate, regular rhythm and intact distal pulses.  Exam reveals no gallop.    No murmur heard.  Pulmonary/Chest: Effort normal and breath sounds normal. No respiratory distress. He has no wheezes. He exhibits no tenderness.   Abdominal: Soft. Bowel sounds are normal. He exhibits no distension. There is no tenderness. There is no rebound and no guarding.   Musculoskeletal: Normal range of motion.   Neurological: He is alert and oriented to person, place, and time.   Skin: Skin is warm and dry.   Psychiatric: He has a normal mood and affect.       Laboratory:  CBC:   Recent Labs  Lab 01/11/17  0409   WBC 4.99   RBC 3.80*   HGB 11.7*   HCT 33.9*   *   MCV 89   MCH 30.8   MCHC 34.5     CMP:   Recent Labs  Lab 01/09/17  0738  01/11/17  0409   GLU 93  < > 100   CALCIUM 8.6*  < > 8.3*   ALBUMIN 3.8  3.8  < > 3.5   PROT 7.0  --   --      < > 137   K 4.1  < > 4.4   CO2 23  < > 20*     < > 110   BUN 26*  < > 28*   CREATININE 1.4  < > 1.3   ALKPHOS 101  --   --    *  --   --    AST 47*  --   --    BILITOT 0.4  --   --    < > = values in this interval not displayed.  Labs within the past 24 hours have been reviewed.    Diagnostic Results:  Labs:  Reviewed    ASSESSMENT/PLAN:     1. S/p kidney txp 11/26/16 for HTN: Cr elevated, pt reports good UOP. Bx 1/6 with AVR. DSAs neg. Plan for thymo. Monitor.   2. 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 Plan for peripheral thymo x 5-7 doses, #1 1/11 will hold of second dose today 2/2 fever 100.5. Check CD3 after third dose. Blood and urine cultures sent. Monitor.   3. Management of immunosuppressants: Cont prograf and cellcept. Monitor for toxicities and adjust dose accordingly. Cont valcyte, bactrim and nystatin for opportunistic infection prophylaxis. CMV pending. G6PD pending.   4. Transaminitis: Discussed with hepatology on previous admission. US 1/5 abdomen no acute findings. May be related to bactrim? G6PD pending. Cont CMPs.   5. Hx of HTN: cont metoprolol. Monitor.   6. Acidosis: cont bicarb. Monitor.   7. Hypomagnesemia: Cont mg ox. Monitor.   8. GI prophylaxis: cont H2 blocker. Monitor.   9. DVT prophylaxis: subq heparin.   10. Dispo: Not a candidate at this time.

## 2017-01-11 NOTE — PROGRESS NOTES
J. Oppenheimer, PA notified of patient's temp 100.5. Tylenol and Benadryl due as pre-medication for Thymo. Solumedrol 200mg IV already infused. Awaiting further instruction from PA.     [id number] Dr. Gardner notified of above per PA. Instructed to hold Thymo dose. Blood cultures to be ordered. Will implement and continue to monitor.

## 2017-01-12 LAB
ALBUMIN SERPL BCP-MCNC: 3 G/DL
ANION GAP SERPL CALC-SCNC: 8 MMOL/L
BASOPHILS # BLD AUTO: 0 K/UL
BASOPHILS NFR BLD: 0 %
BUN SERPL-MCNC: 28 MG/DL
CALCIUM SERPL-MCNC: 7.8 MG/DL
CHLORIDE SERPL-SCNC: 106 MMOL/L
CO2 SERPL-SCNC: 22 MMOL/L
CREAT SERPL-MCNC: 1.3 MG/DL
CYTOMEGALOVIRUS DNA: NOT DETECTED
CYTOMEGALOVIRUS LOG (IU/ML): <2.4 LOG (10) COPIES/ML
CYTOMEGALOVIRUS PCR, QUANT: <250 COPIES/ML
CYTOMEGALOVIRUS SOURCE: NORMAL
DIFFERENTIAL METHOD: ABNORMAL
EOSINOPHIL # BLD AUTO: 0 K/UL
EOSINOPHIL NFR BLD: 0.2 %
ERYTHROCYTE [DISTWIDTH] IN BLOOD BY AUTOMATED COUNT: 16.5 %
EST. GFR  (AFRICAN AMERICAN): >60 ML/MIN/1.73 M^2
EST. GFR  (NON AFRICAN AMERICAN): 57.3 ML/MIN/1.73 M^2
GLUCOSE SERPL-MCNC: 93 MG/DL
GLUCOSE-6-PHOSPHATE DEHYDROGENASE QUAL: NORMAL
HCT VFR BLD AUTO: 29.6 %
HGB BLD-MCNC: 10.1 G/DL
LYMPHOCYTES # BLD AUTO: 0 K/UL
LYMPHOCYTES NFR BLD: 0.8 %
MAGNESIUM SERPL-MCNC: 1.9 MG/DL
MCH RBC QN AUTO: 30.8 PG
MCHC RBC AUTO-ENTMCNC: 34.1 %
MCV RBC AUTO: 90 FL
MONOCYTES # BLD AUTO: 0.1 K/UL
MONOCYTES NFR BLD: 2.2 %
NEUTROPHILS # BLD AUTO: 4.9 K/UL
NEUTROPHILS NFR BLD: 96.4 %
PHOSPHATE SERPL-MCNC: 2.7 MG/DL
PLATELET # BLD AUTO: 119 K/UL
PMV BLD AUTO: 10.7 FL
POTASSIUM SERPL-SCNC: 4.3 MMOL/L
RBC # BLD AUTO: 3.28 M/UL
SODIUM SERPL-SCNC: 136 MMOL/L
TACROLIMUS BLD-MCNC: 6.3 NG/ML
WBC # BLD AUTO: 5.06 K/UL

## 2017-01-12 PROCEDURE — 83735 ASSAY OF MAGNESIUM: CPT

## 2017-01-12 PROCEDURE — 63600175 PHARM REV CODE 636 W HCPCS: Performed by: NURSE PRACTITIONER

## 2017-01-12 PROCEDURE — 25000003 PHARM REV CODE 250: Performed by: NURSE PRACTITIONER

## 2017-01-12 PROCEDURE — 25000003 PHARM REV CODE 250: Performed by: STUDENT IN AN ORGANIZED HEALTH CARE EDUCATION/TRAINING PROGRAM

## 2017-01-12 PROCEDURE — 25000003 PHARM REV CODE 250: Performed by: PHYSICIAN ASSISTANT

## 2017-01-12 PROCEDURE — 85025 COMPLETE CBC W/AUTO DIFF WBC: CPT

## 2017-01-12 PROCEDURE — 63600175 PHARM REV CODE 636 W HCPCS: Performed by: STUDENT IN AN ORGANIZED HEALTH CARE EDUCATION/TRAINING PROGRAM

## 2017-01-12 PROCEDURE — 99233 SBSQ HOSP IP/OBS HIGH 50: CPT | Mod: ,,, | Performed by: NURSE PRACTITIONER

## 2017-01-12 PROCEDURE — 80197 ASSAY OF TACROLIMUS: CPT

## 2017-01-12 PROCEDURE — 20600001 HC STEP DOWN PRIVATE ROOM

## 2017-01-12 PROCEDURE — 80069 RENAL FUNCTION PANEL: CPT

## 2017-01-12 PROCEDURE — 63600175 PHARM REV CODE 636 W HCPCS: Performed by: INTERNAL MEDICINE

## 2017-01-12 PROCEDURE — 36415 COLL VENOUS BLD VENIPUNCTURE: CPT

## 2017-01-12 RX ORDER — TACROLIMUS 1 MG/1
9 CAPSULE ORAL EVERY MORNING
Status: DISCONTINUED | OUTPATIENT
Start: 2017-01-13 | End: 2017-01-14

## 2017-01-12 RX ORDER — TACROLIMUS 1 MG/1
8 CAPSULE ORAL EVERY EVENING
Status: DISCONTINUED | OUTPATIENT
Start: 2017-01-12 | End: 2017-01-14

## 2017-01-12 RX ORDER — DIPHENHYDRAMINE HCL 50 MG
50 CAPSULE ORAL ONCE AS NEEDED
Status: ACTIVE | OUTPATIENT
Start: 2017-01-12 | End: 2017-01-12

## 2017-01-12 RX ORDER — ACETAMINOPHEN 325 MG/1
650 TABLET ORAL ONCE AS NEEDED
Status: ACTIVE | OUTPATIENT
Start: 2017-01-12 | End: 2017-01-12

## 2017-01-12 RX ORDER — TACROLIMUS 1 MG/1
1 CAPSULE ORAL ONCE
Status: COMPLETED | OUTPATIENT
Start: 2017-01-12 | End: 2017-01-12

## 2017-01-12 RX ORDER — METHYLPREDNISOLONE SOD SUCC 125 MG
125 VIAL (EA) INJECTION ONCE
Status: COMPLETED | OUTPATIENT
Start: 2017-01-12 | End: 2017-01-12

## 2017-01-12 RX ORDER — ACETAMINOPHEN 325 MG/1
650 TABLET ORAL ONCE
Status: COMPLETED | OUTPATIENT
Start: 2017-01-12 | End: 2017-01-12

## 2017-01-12 RX ORDER — EPINEPHRINE 1 MG/ML
1 INJECTION, SOLUTION INTRACARDIAC; INTRAMUSCULAR; INTRAVENOUS; SUBCUTANEOUS ONCE AS NEEDED
Status: ACTIVE | OUTPATIENT
Start: 2017-01-12 | End: 2017-01-12

## 2017-01-12 RX ORDER — DIPHENHYDRAMINE HCL 25 MG
25 CAPSULE ORAL ONCE
Status: COMPLETED | OUTPATIENT
Start: 2017-01-12 | End: 2017-01-12

## 2017-01-12 RX ADMIN — Medication 12.5 MG: at 10:01

## 2017-01-12 RX ADMIN — NYSTATIN 500000 UNITS: 500000 SUSPENSION ORAL at 08:01

## 2017-01-12 RX ADMIN — SODIUM BICARBONATE 650 MG TABLET 1950 MG: at 10:01

## 2017-01-12 RX ADMIN — MYCOPHENOLATE MOFETIL 500 MG: 250 CAPSULE ORAL at 08:01

## 2017-01-12 RX ADMIN — LEVOTHYROXINE SODIUM 112 MCG: 112 TABLET ORAL at 06:01

## 2017-01-12 RX ADMIN — THERA TABS 1 TABLET: TAB at 08:01

## 2017-01-12 RX ADMIN — SODIUM BICARBONATE 650 MG TABLET 1950 MG: at 08:01

## 2017-01-12 RX ADMIN — CINACALCET HYDROCHLORIDE 30 MG: 30 TABLET, COATED ORAL at 09:01

## 2017-01-12 RX ADMIN — METHYLPREDNISOLONE SODIUM SUCCINATE 125 MG: 125 INJECTION, POWDER, FOR SOLUTION INTRAMUSCULAR; INTRAVENOUS at 10:01

## 2017-01-12 RX ADMIN — ANTI-THYMOCYTE GLOBULIN (RABBIT) 100 MG: 5 INJECTION, POWDER, LYOPHILIZED, FOR SOLUTION INTRAVENOUS at 10:01

## 2017-01-12 RX ADMIN — FAMOTIDINE 20 MG: 20 TABLET, FILM COATED ORAL at 10:01

## 2017-01-12 RX ADMIN — VALGANCICLOVIR 450 MG: 450 TABLET, FILM COATED ORAL at 08:01

## 2017-01-12 RX ADMIN — Medication 3 ML: at 06:01

## 2017-01-12 RX ADMIN — DIBASIC SODIUM PHOSPHATE, MONOBASIC POTASSIUM PHOSPHATE AND MONOBASIC SODIUM PHOSPHATE 2 TABLET: 852; 155; 130 TABLET ORAL at 01:01

## 2017-01-12 RX ADMIN — MYCOPHENOLATE MOFETIL 500 MG: 250 CAPSULE ORAL at 10:01

## 2017-01-12 RX ADMIN — MAGNESIUM OXIDE TAB 400 MG (241.3 MG ELEMENTAL MG) 400 MG: 400 (241.3 MG) TAB at 08:01

## 2017-01-12 RX ADMIN — ACETAMINOPHEN 650 MG: 325 TABLET ORAL at 10:01

## 2017-01-12 RX ADMIN — NYSTATIN 500000 UNITS: 500000 SUSPENSION ORAL at 12:01

## 2017-01-12 RX ADMIN — Medication 3 ML: at 02:01

## 2017-01-12 RX ADMIN — Medication 3 ML: at 10:01

## 2017-01-12 RX ADMIN — DIPHENHYDRAMINE HYDROCHLORIDE 25 MG: 25 CAPSULE ORAL at 10:01

## 2017-01-12 RX ADMIN — NYSTATIN 500000 UNITS: 500000 SUSPENSION ORAL at 10:01

## 2017-01-12 RX ADMIN — DIBASIC SODIUM PHOSPHATE, MONOBASIC POTASSIUM PHOSPHATE AND MONOBASIC SODIUM PHOSPHATE 2 TABLET: 852; 155; 130 TABLET ORAL at 10:01

## 2017-01-12 RX ADMIN — DIBASIC SODIUM PHOSPHATE, MONOBASIC POTASSIUM PHOSPHATE AND MONOBASIC SODIUM PHOSPHATE 2 TABLET: 852; 155; 130 TABLET ORAL at 06:01

## 2017-01-12 RX ADMIN — NYSTATIN 500000 UNITS: 500000 SUSPENSION ORAL at 05:01

## 2017-01-12 RX ADMIN — OXYCODONE HYDROCHLORIDE AND ACETAMINOPHEN 1 TABLET: 5; 325 TABLET ORAL at 01:01

## 2017-01-12 RX ADMIN — TACROLIMUS 8 MG: 1 CAPSULE, GELATIN COATED ORAL at 08:01

## 2017-01-12 RX ADMIN — TACROLIMUS 1 MG: 1 CAPSULE, GELATIN COATED ORAL at 03:01

## 2017-01-12 RX ADMIN — TACROLIMUS 8 MG: 1 CAPSULE, GELATIN COATED ORAL at 05:01

## 2017-01-12 RX ADMIN — SULFAMETHOXAZOLE AND TRIMETHOPRIM 1 TABLET: 400; 80 TABLET ORAL at 08:01

## 2017-01-12 NOTE — PROGRESS NOTES
Orders given by Janna August NP to administer thymo dose #2 this am.  Orders given to use unadministered medication from yesterday with expiration date of 01/12/17 at 1100.      Peripheral IV infiltrated upon administering thymo dose #2.  New PIV inserted to R forearm 20g.  Vital signs taken prior to initiation of thymo.  Thymo started at 50ml/hr, to be run over 10 hours due to hx of sensitivity.  No issues at this time.  Will continue to monitor pt closely.

## 2017-01-12 NOTE — PROGRESS NOTES
Progress Note  Transplant Surgery    Admit Date: 2017  Post-operative Day: 46  Hospital Day: 4    ORGAN: LEFT KIDNEY    Disease Etiology: Hypertensive Nephrosclerosis  Donor Type:  - Brain Death    CDC High Risk: Yes    Donor CMV Status: Positive  Donor CMV Status:   Donor HBcAB: Negative    Donor HCV Status: Negative    Whole or Partial:   Biliary Anastomosis:   Arterial Anatomy:     SUBJECTIVE:   HPI: Mr. Burkett is a 66 y.o. year old male who is status post  donor kidney transplant on 16 for HTN.  His maintenance immunosuppression consists of mycophenolate mofetil and tacrolimus. Pt recently admitted for DIVINE with suspected rejection; however, labs on admit  showed improvement of Cr from 2.6 to 1.6. Pt did receive 1 dose of SMP after labs drawn. A decision was made to bx pt  and d/c. Bx revealed AVR and pt was readmitted  to begin tx with thymo x 5-7 doses. DSA  neg.      Follow up: No acute events overnight. No fever since  at 1330.  Of note, fever resolved w/o intervention- no Tylenol administered.  Patient denies fever, chills, n/v, abd pain or diarrhea.  VSS.  He received Thymo #1 1/10/17 w/o complications.  Cr at baseline this am.  Blood cultures prelim w NGTD.  UA w trace leuks and few bacteria, urine cx pending.  Plan for Thymp #2 today.  Tentative plan for dose #3 tomorrow and likely Saturday.  He will need CD3 level after third dose however CD3 not ran over wkd.      Follow-up For: * No surgery found *    Review of patient's allergies indicates:  No Known Allergies    Review of Systems   Constitutional: Negative for appetite change, chills, diaphoresis, fatigue and fever.   HENT: Negative for trouble swallowing.    Respiratory: Negative for cough, shortness of breath and wheezing.    Cardiovascular: Negative for chest pain, palpitations and leg swelling.   Gastrointestinal: Negative for abdominal distention, abdominal pain, constipation, diarrhea, nausea and  vomiting.   Genitourinary: Negative for decreased urine volume, dysuria, frequency and urgency.   Allergic/Immunologic: Positive for immunocompromised state.   Neurological: Negative for dizziness, weakness, numbness and headaches.     OBJECTIVE:     Vital Signs (Most Recent)  Temp: 98.5 °F (36.9 °C) (01/12/17 0800)  Pulse: 75 (01/12/17 0800)  Resp: 16 (01/12/17 0800)  BP: 133/85 (01/12/17 0800)  SpO2: 100 % (01/12/17 0800)    Vital Signs Range (Last 24H):  Temp:  [98.4 °F (36.9 °C)-100.5 °F (38.1 °C)]   Pulse:  []   Resp:  [16-20]   BP: (114-135)/(73-94)   SpO2:  [97 %-100 %]     I & O (Last 24H):    Intake/Output Summary (Last 24 hours) at 01/12/17 0854  Last data filed at 01/12/17 0810   Gross per 24 hour   Intake             2040 ml   Output             2550 ml   Net             -510 ml     Physical Exam   Constitutional: He is oriented to person, place, and time. He appears well-developed and well-nourished.   HENT:   Head: Normocephalic and atraumatic.   Mouth/Throat: No oropharyngeal exudate.   Eyes: EOM are normal. Pupils are equal, round, and reactive to light. No scleral icterus.   Neck: Normal range of motion. Neck supple. No thyromegaly present.   Cardiovascular: Normal rate, regular rhythm and intact distal pulses.  Exam reveals no gallop.    No murmur heard.  Pulmonary/Chest: Effort normal and breath sounds normal. No respiratory distress. He has no wheezes. He exhibits no tenderness.   Abdominal: Soft. Bowel sounds are normal. He exhibits no distension. There is no tenderness. There is no rebound and no guarding.   Musculoskeletal: Normal range of motion.   Neurological: He is alert and oriented to person, place, and time.   Skin: Skin is warm and dry.   Psychiatric: He has a normal mood and affect. His behavior is normal. Judgment and thought content normal.   Nursing note and vitals reviewed.      Laboratory:  CBC:     Recent Labs  Lab 01/12/17  0438   WBC 5.06   RBC 3.28*   HGB 10.1*   HCT  29.6*   *   MCV 90   MCH 30.8   MCHC 34.1     CMP:   Recent Labs  Lab 01/09/17  0738  01/12/17  0438   GLU 93  < > 93   CALCIUM 8.6*  < > 7.8*   ALBUMIN 3.8  3.8  < > 3.0*   PROT 7.0  --   --      < > 136   K 4.1  < > 4.3   CO2 23  < > 22*     < > 106   BUN 26*  < > 28*   CREATININE 1.4  < > 1.3   ALKPHOS 101  --   --    *  --   --    AST 47*  --   --    BILITOT 0.4  --   --    < > = values in this interval not displayed.     Tacrolimus Lvl   Date Value Ref Range Status   01/12/2017 6.3 5.0 - 15.0 ng/mL Final     Comment:     Testing performed by Liquid Chromatography-Tandem  Mass Spectrometry (LC-MS/MS).  This test was developed and its performance characteristics  determined by [facility], Department of Pathology  and Laboratory Medicine in a manner consistent with CLIA  requirements. It has not been cleared or approved by the [place]  Food and Drug Administration.  This test is used for clinical   purposes.  It should not be regarded as investigational or for  research.     01/11/2017 8.2 5.0 - 15.0 ng/mL Final     Comment:     Testing performed by Liquid Chromatography-Tandem  Mass Spectrometry (LC-MS/MS).  This test was developed and its performance characteristics  determined by [facility], Department of Pathology  and Laboratory Medicine in a manner consistent with CLIA  requirements. It has not been cleared or approved by the [place]  Food and Drug Administration.  This test is used for clinical   purposes.  It should not be regarded as investigational or for  research.     01/10/2017 8.8 5.0 - 15.0 ng/mL Final     Comment:     Testing performed by Liquid Chromatography-Tandem  Mass Spectrometry (LC-MS/MS).  This test was developed and its performance characteristics  determined by [facility], Department of Pathology  and Laboratory Medicine in a manner consistent with CLIA  requirements. It has not been cleared or approved by the [place]  Food and Drug  Administration.  This test is used for clinical   purposes.  It should not be regarded as investigational or for  research.     01/09/2017 9.6 5.0 - 15.0 ng/mL Final     Comment:     Testing performed by Liquid Chromatography-Tandem  Mass Spectrometry (LC-MS/MS).  This test was developed and its performance characteristics  determined by [facility], Department of Pathology  and Laboratory Medicine in a manner consistent with CLIA  requirements. It has not been cleared or approved by the [place]  Food and Drug Administration.  This test is used for clinical   purposes.  It should not be regarded as investigational or for  research.         Labs within the past 24 hours have been reviewed.    Diagnostic Results:  Labs: Reviewed    ASSESSMENT/PLAN:     1. S/p kidney txp 11/26/16 for HTN: Cr elevated on outpt labs prompting Kidney bx 1/6 which showed AVR. DSAs neg. Patient admitted for tx w Thymo.  Cont to make excellent urine.   Monitor.   2. 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 Plan for peripheral thymo x 5-7 doses (tentatively).  Thymo #1 1/10/17 w/o complications.   Dose cancelled 1/11 for temp 100.5.  W/u unremarkable and no further fever.  Plan for Thymo #2 today.  Patient should have CD3 after third dose however, lab does not run CD3 on wkd, may push CD3 to Monday.  Monitor.   3. Fever- 100.5 on 1/11/17 at 1330.  Resolved w/o intervention.  Blood cx NGTD.  UA w trace leuks and few bacteria, cx pending.  Cont to monitor.   4. Management of immunosuppressants: Cont prograf and cellcept. Monitor for toxicities and adjust dose accordingly. Cont valcyte, bactrim and nystatin for opportunistic infection prophylaxis. CMV in process. G6PD normal acitivity.   5. Transaminitis: Discussed with hepatology on previous admission. US 1/5 abdomen no acute findings. May be related to bactrim? G6PD normal activity.  Will change renal panel to CMP tomorrow.    6. Hx of HTN: well managed.  Cont metoprolol.  Monitor.   7. Acidosis: improved.  Cont bicarb. Monitor.   8. Hypomagnesemia: Cont mg ox. Monitor.   9. GI prophylaxis: cont H2 blocker. Monitor.   10. DVT prophylaxis: subq heparin.  Encourage ambulation.   11. Dispo: Not a candidate at this time.

## 2017-01-12 NOTE — PROGRESS NOTES
Pt aaox3.  Bed in low and locked position.  Nonskid socks in use.  Call bell, phone, food, drink, urinal within reach in bed or on bedside table.  Pt aware to call if needing assistance.  L ++.  R PIV.  Thymo #2 held today due to temp of 100.5.  Urine and blood cultures sent.  Plan for 5-7 doses of thymo with a cd3 count after 3rd dose.  Refuses heparin sq.  Complains of neck pain from old fall at home and is given prn po percocet that relieves pain.  See flowsheet for full assessment and vitals

## 2017-01-13 LAB
ALBUMIN SERPL BCP-MCNC: 3 G/DL
ALP SERPL-CCNC: 86 U/L
ALT SERPL W/O P-5'-P-CCNC: 100 U/L
ANION GAP SERPL CALC-SCNC: 8 MMOL/L
AST SERPL-CCNC: 37 U/L
BASOPHILS # BLD AUTO: 0 K/UL
BASOPHILS NFR BLD: 0 %
BILIRUB SERPL-MCNC: 0.3 MG/DL
BUN SERPL-MCNC: 26 MG/DL
CALCIUM SERPL-MCNC: 7.9 MG/DL
CHLORIDE SERPL-SCNC: 111 MMOL/L
CO2 SERPL-SCNC: 21 MMOL/L
CREAT SERPL-MCNC: 1.2 MG/DL
DIFFERENTIAL METHOD: ABNORMAL
EOSINOPHIL # BLD AUTO: 0 K/UL
EOSINOPHIL NFR BLD: 0.5 %
ERYTHROCYTE [DISTWIDTH] IN BLOOD BY AUTOMATED COUNT: 16.6 %
EST. GFR  (AFRICAN AMERICAN): >60 ML/MIN/1.73 M^2
EST. GFR  (NON AFRICAN AMERICAN): >60 ML/MIN/1.73 M^2
GLUCOSE SERPL-MCNC: 77 MG/DL
HCT VFR BLD AUTO: 30 %
HGB BLD-MCNC: 10.3 G/DL
LYMPHOCYTES # BLD AUTO: 0 K/UL
LYMPHOCYTES NFR BLD: 1.9 %
MAGNESIUM SERPL-MCNC: 1.7 MG/DL
MCH RBC QN AUTO: 31.1 PG
MCHC RBC AUTO-ENTMCNC: 34.3 %
MCV RBC AUTO: 91 FL
MONOCYTES # BLD AUTO: 0.1 K/UL
MONOCYTES NFR BLD: 6.1 %
NEUTROPHILS # BLD AUTO: 1.9 K/UL
NEUTROPHILS NFR BLD: 90.1 %
PLATELET # BLD AUTO: 91 K/UL
PMV BLD AUTO: 11.2 FL
POTASSIUM SERPL-SCNC: 3.8 MMOL/L
PROT SERPL-MCNC: 6 G/DL
RBC # BLD AUTO: 3.31 M/UL
SODIUM SERPL-SCNC: 140 MMOL/L
TACROLIMUS BLD-MCNC: 5.4 NG/ML
WBC # BLD AUTO: 2.14 K/UL

## 2017-01-13 PROCEDURE — 63600175 PHARM REV CODE 636 W HCPCS: Performed by: NURSE PRACTITIONER

## 2017-01-13 PROCEDURE — 80053 COMPREHEN METABOLIC PANEL: CPT

## 2017-01-13 PROCEDURE — 80197 ASSAY OF TACROLIMUS: CPT

## 2017-01-13 PROCEDURE — 85025 COMPLETE CBC W/AUTO DIFF WBC: CPT

## 2017-01-13 PROCEDURE — 20600001 HC STEP DOWN PRIVATE ROOM

## 2017-01-13 PROCEDURE — 25000003 PHARM REV CODE 250: Performed by: NURSE PRACTITIONER

## 2017-01-13 PROCEDURE — 99233 SBSQ HOSP IP/OBS HIGH 50: CPT | Mod: ,,, | Performed by: NURSE PRACTITIONER

## 2017-01-13 PROCEDURE — 25000003 PHARM REV CODE 250: Performed by: PHYSICIAN ASSISTANT

## 2017-01-13 PROCEDURE — 63600175 PHARM REV CODE 636 W HCPCS: Performed by: INTERNAL MEDICINE

## 2017-01-13 PROCEDURE — 63600175 PHARM REV CODE 636 W HCPCS: Performed by: STUDENT IN AN ORGANIZED HEALTH CARE EDUCATION/TRAINING PROGRAM

## 2017-01-13 PROCEDURE — 36415 COLL VENOUS BLD VENIPUNCTURE: CPT

## 2017-01-13 PROCEDURE — 83735 ASSAY OF MAGNESIUM: CPT

## 2017-01-13 PROCEDURE — 25000003 PHARM REV CODE 250: Performed by: STUDENT IN AN ORGANIZED HEALTH CARE EDUCATION/TRAINING PROGRAM

## 2017-01-13 RX ORDER — ACETAMINOPHEN 325 MG/1
650 TABLET ORAL ONCE
Status: COMPLETED | OUTPATIENT
Start: 2017-01-13 | End: 2017-01-13

## 2017-01-13 RX ORDER — PREDNISONE 20 MG/1
20 TABLET ORAL DAILY
Status: DISCONTINUED | OUTPATIENT
Start: 2017-01-13 | End: 2017-01-16 | Stop reason: HOSPADM

## 2017-01-13 RX ORDER — DIPHENHYDRAMINE HCL 25 MG
25 CAPSULE ORAL ONCE
Status: COMPLETED | OUTPATIENT
Start: 2017-01-13 | End: 2017-01-13

## 2017-01-13 RX ORDER — DIPHENHYDRAMINE HCL 50 MG
50 CAPSULE ORAL ONCE AS NEEDED
Status: ACTIVE | OUTPATIENT
Start: 2017-01-13 | End: 2017-01-13

## 2017-01-13 RX ORDER — AMOXICILLIN AND CLAVULANATE POTASSIUM 500; 125 MG/1; MG/1
1 TABLET, FILM COATED ORAL 2 TIMES DAILY
Status: CANCELLED | OUTPATIENT
Start: 2017-01-13 | End: 2017-01-23

## 2017-01-13 RX ORDER — EPINEPHRINE 1 MG/ML
1 INJECTION, SOLUTION INTRACARDIAC; INTRAMUSCULAR; INTRAVENOUS; SUBCUTANEOUS ONCE AS NEEDED
Status: ACTIVE | OUTPATIENT
Start: 2017-01-13 | End: 2017-01-13

## 2017-01-13 RX ORDER — ACETAMINOPHEN 325 MG/1
650 TABLET ORAL ONCE AS NEEDED
Status: ACTIVE | OUTPATIENT
Start: 2017-01-13 | End: 2017-01-13

## 2017-01-13 RX ADMIN — MYCOPHENOLATE MOFETIL 500 MG: 250 CAPSULE ORAL at 08:01

## 2017-01-13 RX ADMIN — Medication 12.5 MG: at 08:01

## 2017-01-13 RX ADMIN — VALGANCICLOVIR 450 MG: 450 TABLET, FILM COATED ORAL at 08:01

## 2017-01-13 RX ADMIN — ANTI-THYMOCYTE GLOBULIN (RABBIT) 50 MG: 5 INJECTION, POWDER, LYOPHILIZED, FOR SOLUTION INTRAVENOUS at 01:01

## 2017-01-13 RX ADMIN — NYSTATIN 500000 UNITS: 500000 SUSPENSION ORAL at 01:01

## 2017-01-13 RX ADMIN — FAMOTIDINE 20 MG: 20 TABLET, FILM COATED ORAL at 08:01

## 2017-01-13 RX ADMIN — THERA TABS 1 TABLET: TAB at 08:01

## 2017-01-13 RX ADMIN — DIPHENHYDRAMINE HYDROCHLORIDE 25 MG: 25 CAPSULE ORAL at 12:01

## 2017-01-13 RX ADMIN — ACETAMINOPHEN 650 MG: 325 TABLET ORAL at 12:01

## 2017-01-13 RX ADMIN — OXYCODONE HYDROCHLORIDE AND ACETAMINOPHEN 1 TABLET: 5; 325 TABLET ORAL at 06:01

## 2017-01-13 RX ADMIN — DIBASIC SODIUM PHOSPHATE, MONOBASIC POTASSIUM PHOSPHATE AND MONOBASIC SODIUM PHOSPHATE 2 TABLET: 852; 155; 130 TABLET ORAL at 06:01

## 2017-01-13 RX ADMIN — PREDNISONE 20 MG: 20 TABLET ORAL at 12:01

## 2017-01-13 RX ADMIN — TACROLIMUS 8 MG: 1 CAPSULE, GELATIN COATED ORAL at 06:01

## 2017-01-13 RX ADMIN — VANCOMYCIN HYDROCHLORIDE 1000 MG: 1 INJECTION, POWDER, LYOPHILIZED, FOR SOLUTION INTRAVENOUS at 11:01

## 2017-01-13 RX ADMIN — Medication 100 MG: at 12:01

## 2017-01-13 RX ADMIN — SODIUM BICARBONATE 650 MG TABLET 1950 MG: at 08:01

## 2017-01-13 RX ADMIN — TACROLIMUS 9 MG: 1 CAPSULE, GELATIN COATED ORAL at 08:01

## 2017-01-13 RX ADMIN — SULFAMETHOXAZOLE AND TRIMETHOPRIM 1 TABLET: 400; 80 TABLET ORAL at 08:01

## 2017-01-13 RX ADMIN — NYSTATIN 500000 UNITS: 500000 SUSPENSION ORAL at 08:01

## 2017-01-13 RX ADMIN — Medication 3 ML: at 06:01

## 2017-01-13 RX ADMIN — MAGNESIUM OXIDE TAB 400 MG (241.3 MG ELEMENTAL MG) 400 MG: 400 (241.3 MG) TAB at 08:01

## 2017-01-13 RX ADMIN — Medication 12.5 MG: at 09:01

## 2017-01-13 RX ADMIN — DIBASIC SODIUM PHOSPHATE, MONOBASIC POTASSIUM PHOSPHATE AND MONOBASIC SODIUM PHOSPHATE 2 TABLET: 852; 155; 130 TABLET ORAL at 08:01

## 2017-01-13 RX ADMIN — LEVOTHYROXINE SODIUM 112 MCG: 112 TABLET ORAL at 06:01

## 2017-01-13 NOTE — PROGRESS NOTES
PIV thymo was scanned no duel signature required. Charge nurse Chi SANCHES RN at bedside to verify. This was brought to sandra's (pharamcist) attention. She stated she will address.

## 2017-01-13 NOTE — PROGRESS NOTES
SARBJIT met with pt and significant other: Jayden to assess coping. Pt reports that he is doing well, however had questions about a letter sent by [employer] regarding a letter they sent him about the Ticket to Work Program and pt's eligibility. SW read and explained the letter. Pt expressed gratefulness for SW time. Pt's significant other also reports that her father: Yaniv Winston (age 80), [phone] would be coming to  pt on Monday if pt discharges. Patient and Caregiver expressed no other needs at this time. Pt will continue to be followed for any continuity of care issues, discharge planning, and emotional support. SARBJIT remains available at [phone].

## 2017-01-13 NOTE — PROGRESS NOTES
Progress Note  Transplant Surgery    Admit Date: 2017  Post-operative Day: 46  Hospital Day: 5    ORGAN: LEFT KIDNEY    Disease Etiology: Hypertensive Nephrosclerosis  Donor Type:  - Brain Death    CDC High Risk: Yes    Donor CMV Status: Positive  Donor CMV Status:   Donor HBcAB: Negative    Donor HCV Status: Negative    Whole or Partial:   Biliary Anastomosis:   Arterial Anatomy:     SUBJECTIVE:   HPI: Mr. Burkett is a 66 y.o. year old male who is status post  donor kidney transplant on 16 for HTN.  His maintenance immunosuppression consists of mycophenolate mofetil and tacrolimus. Pt recently admitted for DIVINE with suspected rejection; however, labs on admit  showed improvement of Cr from 2.6 to 1.6. Pt did receive 1 dose of SMP after labs drawn. A decision was made to bx pt  and d/c. Bx revealed AVR and pt was readmitted  to begin tx with thymo x 5-7 doses. DSA  neg.      Follow up: No acute events overnight. Remains afebrile.  Tolerated dose #2 of Thymo.  Wbc decreased from 5 to 2.  Plan to proceed w #3 half dose THYMO today.  Cr improved again today to 1.2. Blood cx NGTD.  UA w trace leuks and few bacteria.  Urine cx pending.  He will need CD3 level after third dose however CD3 not ran over wkd.  Will plan for CD3 on Monday in am.      Follow-up For: * No surgery found *    Review of patient's allergies indicates:  No Known Allergies    Review of Systems   Constitutional: Negative for appetite change, chills, diaphoresis, fatigue and fever.   HENT: Negative for trouble swallowing.    Respiratory: Negative for cough, shortness of breath and wheezing.    Cardiovascular: Negative for chest pain, palpitations and leg swelling.   Gastrointestinal: Negative for abdominal distention, abdominal pain, constipation, diarrhea, nausea and vomiting.   Genitourinary: Negative for decreased urine volume, dysuria, frequency and urgency.   Allergic/Immunologic: Positive for immunocompromised  state.   Neurological: Negative for dizziness, weakness, numbness and headaches.     OBJECTIVE:     Vital Signs (Most Recent)  Temp: 98.5 °F (36.9 °C) (01/13/17 0000)  Pulse: 76 (01/13/17 0400)  Resp: 20 (01/13/17 0000)  BP: (!) 120/90 (01/13/17 0400)  SpO2: 98 % (01/13/17 0400)    Vital Signs Range (Last 24H):  Temp:  [98.1 °F (36.7 °C)-98.9 °F (37.2 °C)]   Pulse:  [73-86]   Resp:  [12-20]   BP: (111-136)/(63-95)   SpO2:  [90 %-100 %]     I & O (Last 24H):    Intake/Output Summary (Last 24 hours) at 01/13/17 0807  Last data filed at 01/13/17 0600   Gross per 24 hour   Intake             2740 ml   Output             1800 ml   Net              940 ml     Physical Exam   Constitutional: He is oriented to person, place, and time. He appears well-developed and well-nourished.   HENT:   Head: Normocephalic and atraumatic.   Mouth/Throat: No oropharyngeal exudate.   Eyes: EOM are normal. Pupils are equal, round, and reactive to light. No scleral icterus.   Neck: Normal range of motion. Neck supple. No thyromegaly present.   Cardiovascular: Normal rate, regular rhythm and intact distal pulses.  Exam reveals no gallop.    No murmur heard.  Pulmonary/Chest: Effort normal and breath sounds normal. No respiratory distress. He has no wheezes. He exhibits no tenderness.   Abdominal: Soft. Bowel sounds are normal. He exhibits no distension. There is no tenderness. There is no rebound and no guarding.   Musculoskeletal: Normal range of motion.   Neurological: He is alert and oriented to person, place, and time.   Skin: Skin is warm and dry.   Psychiatric: He has a normal mood and affect. His behavior is normal. Judgment and thought content normal.   Nursing note and vitals reviewed.      Laboratory:  CBC:     Recent Labs  Lab 01/13/17  0426   WBC 2.14*   RBC 3.31*   HGB 10.3*   HCT 30.0*   PLT 91*   MCV 91   MCH 31.1*   MCHC 34.3     CMP:     Recent Labs  Lab 01/13/17  0426   GLU 77   CALCIUM 7.9*   ALBUMIN 3.0*   PROT 6.0   NA  140   K 3.8   CO2 21*   *   BUN 26*   CREATININE 1.2   ALKPHOS 86   *   AST 37   BILITOT 0.3      Tacrolimus Lvl   Date Value Ref Range Status   01/13/2017 5.4 5.0 - 15.0 ng/mL Final     Comment:     Testing performed by Liquid Chromatography-Tandem  Mass Spectrometry (LC-MS/MS).  This test was developed and its performance characteristics  determined by [facility], Department of Pathology  and Laboratory Medicine in a manner consistent with CLIA  requirements. It has not been cleared or approved by the [place]  Food and Drug Administration.  This test is used for clinical   purposes.  It should not be regarded as investigational or for  research.     01/12/2017 6.3 5.0 - 15.0 ng/mL Final     Comment:     Testing performed by Liquid Chromatography-Tandem  Mass Spectrometry (LC-MS/MS).  This test was developed and its performance characteristics  determined by [facility], Department of Pathology  and Laboratory Medicine in a manner consistent with CLIA  requirements. It has not been cleared or approved by the [place]  Food and Drug Administration.  This test is used for clinical   purposes.  It should not be regarded as investigational or for  research.     01/11/2017 8.2 5.0 - 15.0 ng/mL Final     Comment:     Testing performed by Liquid Chromatography-Tandem  Mass Spectrometry (LC-MS/MS).  This test was developed and its performance characteristics  determined by [facility], Department of Pathology  and Laboratory Medicine in a manner consistent with CLIA  requirements. It has not been cleared or approved by the [place]  Food and Drug Administration.  This test is used for clinical   purposes.  It should not be regarded as investigational or for  research.     01/10/2017 8.8 5.0 - 15.0 ng/mL Final     Comment:     Testing performed by Liquid Chromatography-Tandem  Mass Spectrometry (LC-MS/MS).  This test was developed and its performance characteristics  determined by [facility], Department of Pathology  and Laboratory Medicine in a manner consistent with CLIA  requirements. It has not been cleared or approved by the [place]  Food and Drug Administration.  This test is used for clinical   purposes.  It should not be regarded as investigational or for  research.         Labs within the past 24 hours have been reviewed.    Diagnostic Results:  Labs: Reviewed    ASSESSMENT/PLAN:     1. S/p kidney txp 11/26/16 for HTN: Cr elevated on outpt labs prompting Kidney bx 1/6 which showed AVR. DSAs neg. Patient admitted for tx w Thymo.  Cont to make excellent urine.   Monitor.   2. 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 Plan for peripheral thymo x 5-7 doses (tentatively).  Thymo #1 1/10/17 w/o complications.   Dose cancelled 1/11 for temp 100.5.  W/u unremarkable and no further fever.  Thymo #2 on 1/12.  Plan for Thymo #3 half dose today given decreased wbc.  Plan for CD3 Monday in am- ordered enetered.  Monitor.   3. Fever: 100.5 on 1/11/17 at 1330.  Resolved w/o intervention.  Blood cx NGTD.  UA w trace leuks and few bacteria.  Urine cx positive enterococcus >100,000.  Plan to treat w Vanc until sensitivities avail.  Cont to monitor.   4. UTI:  Urine cx positive enterococcus >100,000.  Plan to treat w Vanc until sensitivities avail.  Patient asymptomatic.  Monitor.  5. Management of immunosuppressants: Cont prograf.  Will cont Cellcept for now also. Monitor for toxicities and adjust dose accordingly. Cont valcyte, bactrim and nystatin for opportunistic infection prophylaxis. CMV 1/10 not detected. G6PD normal acitivity.   6. Transaminitis: Discussed with hepatology on previous admission. US 1/5 abdomen no acute findings. May be related to bactrim? G6PD normal activity.  Will change renal panel to CMP tomorrow.    7. Hx of HTN: well managed.  Cont metoprolol. Monitor.   8. Acidosis: improved.  Cont bicarb. Monitor.   9. Hypomagnesemia: Cont mag ox. Monitor.   10. GI prophylaxis:  cont H2 blocker. Monitor.   11. DVT prophylaxis: subq heparin.  Encourage ambulation.   12. Dispo: Not a candidate at this time.

## 2017-01-13 NOTE — PLAN OF CARE
Problem: Patient Care Overview  Goal: Plan of Care Review  Outcome: Ongoing (interventions implemented as appropriate)  Client is aaox3, ambulating around room and halls independently. Dose #4 of thymo currently infusing tolerating well. Tolerated meds well. Spouse at bedside today and supportive. Will continue to monitor. Call bell in reach.

## 2017-01-14 LAB
ALBUMIN SERPL BCP-MCNC: 3.3 G/DL
ALP SERPL-CCNC: 93 U/L
ALT SERPL W/O P-5'-P-CCNC: 98 U/L
ANION GAP SERPL CALC-SCNC: 5 MMOL/L
ANISOCYTOSIS BLD QL SMEAR: SLIGHT
AST SERPL-CCNC: 28 U/L
BACTERIA UR CULT: NORMAL
BASOPHILS # BLD AUTO: ABNORMAL K/UL
BASOPHILS NFR BLD: 0 %
BILIRUB SERPL-MCNC: 0.3 MG/DL
BUN SERPL-MCNC: 23 MG/DL
BURR CELLS BLD QL SMEAR: ABNORMAL
CALCIUM SERPL-MCNC: 8.3 MG/DL
CHLORIDE SERPL-SCNC: 113 MMOL/L
CO2 SERPL-SCNC: 23 MMOL/L
CREAT SERPL-MCNC: 1.1 MG/DL
DIFFERENTIAL METHOD: ABNORMAL
EOSINOPHIL # BLD AUTO: ABNORMAL K/UL
EOSINOPHIL NFR BLD: 0 %
ERYTHROCYTE [DISTWIDTH] IN BLOOD BY AUTOMATED COUNT: 16.4 %
EST. GFR  (AFRICAN AMERICAN): >60 ML/MIN/1.73 M^2
EST. GFR  (NON AFRICAN AMERICAN): >60 ML/MIN/1.73 M^2
FERRITIN SERPL-MCNC: 2978 NG/ML
GLUCOSE SERPL-MCNC: 119 MG/DL
HCT VFR BLD AUTO: 33.2 %
HGB BLD-MCNC: 11.2 G/DL
HYPOCHROMIA BLD QL SMEAR: ABNORMAL
IRON SERPL-MCNC: 100 UG/DL
LYMPHOCYTES # BLD AUTO: ABNORMAL K/UL
LYMPHOCYTES NFR BLD: 0 %
MAGNESIUM SERPL-MCNC: 2 MG/DL
MCH RBC QN AUTO: 30.9 PG
MCHC RBC AUTO-ENTMCNC: 33.7 %
MCV RBC AUTO: 92 FL
MONOCYTES # BLD AUTO: ABNORMAL K/UL
MONOCYTES NFR BLD: 2 %
NEUTROPHILS NFR BLD: 97 %
NEUTS BAND NFR BLD MANUAL: 1 %
OVALOCYTES BLD QL SMEAR: ABNORMAL
PHOSPHATE SERPL-MCNC: 1.6 MG/DL
PLATELET # BLD AUTO: 111 K/UL
PMV BLD AUTO: 11.8 FL
POIKILOCYTOSIS BLD QL SMEAR: SLIGHT
POLYCHROMASIA BLD QL SMEAR: ABNORMAL
POTASSIUM SERPL-SCNC: 3.6 MMOL/L
PROT SERPL-MCNC: 6.7 G/DL
RBC # BLD AUTO: 3.62 M/UL
SATURATED IRON: 36 %
SCHISTOCYTES BLD QL SMEAR: ABNORMAL
SODIUM SERPL-SCNC: 141 MMOL/L
TACROLIMUS BLD-MCNC: 9.7 NG/ML
TOTAL IRON BINDING CAPACITY: 277 UG/DL
TRANSFERRIN SERPL-MCNC: 187 MG/DL
WBC # BLD AUTO: 1.8 K/UL

## 2017-01-14 PROCEDURE — 63600175 PHARM REV CODE 636 W HCPCS: Performed by: INTERNAL MEDICINE

## 2017-01-14 PROCEDURE — 84100 ASSAY OF PHOSPHORUS: CPT

## 2017-01-14 PROCEDURE — 84466 ASSAY OF TRANSFERRIN: CPT

## 2017-01-14 PROCEDURE — 25000003 PHARM REV CODE 250: Performed by: NURSE PRACTITIONER

## 2017-01-14 PROCEDURE — 80053 COMPREHEN METABOLIC PANEL: CPT

## 2017-01-14 PROCEDURE — 99233 SBSQ HOSP IP/OBS HIGH 50: CPT | Mod: ,,, | Performed by: INTERNAL MEDICINE

## 2017-01-14 PROCEDURE — 25000003 PHARM REV CODE 250: Performed by: INTERNAL MEDICINE

## 2017-01-14 PROCEDURE — 63600175 PHARM REV CODE 636 W HCPCS: Performed by: STUDENT IN AN ORGANIZED HEALTH CARE EDUCATION/TRAINING PROGRAM

## 2017-01-14 PROCEDURE — 25000003 PHARM REV CODE 250: Performed by: STUDENT IN AN ORGANIZED HEALTH CARE EDUCATION/TRAINING PROGRAM

## 2017-01-14 PROCEDURE — 80197 ASSAY OF TACROLIMUS: CPT

## 2017-01-14 PROCEDURE — 85027 COMPLETE CBC AUTOMATED: CPT

## 2017-01-14 PROCEDURE — 83735 ASSAY OF MAGNESIUM: CPT

## 2017-01-14 PROCEDURE — 82728 ASSAY OF FERRITIN: CPT

## 2017-01-14 PROCEDURE — 63600175 PHARM REV CODE 636 W HCPCS: Performed by: NURSE PRACTITIONER

## 2017-01-14 PROCEDURE — 20600001 HC STEP DOWN PRIVATE ROOM

## 2017-01-14 PROCEDURE — 36415 COLL VENOUS BLD VENIPUNCTURE: CPT

## 2017-01-14 PROCEDURE — 83540 ASSAY OF IRON: CPT

## 2017-01-14 PROCEDURE — 85007 BL SMEAR W/DIFF WBC COUNT: CPT

## 2017-01-14 RX ORDER — TACROLIMUS 1 MG/1
8 CAPSULE ORAL 2 TIMES DAILY
Status: DISCONTINUED | OUTPATIENT
Start: 2017-01-14 | End: 2017-01-15

## 2017-01-14 RX ORDER — LINEZOLID 600 MG/1
600 TABLET, FILM COATED ORAL EVERY 12 HOURS
Status: DISCONTINUED | OUTPATIENT
Start: 2017-01-14 | End: 2017-01-16 | Stop reason: HOSPADM

## 2017-01-14 RX ORDER — ACETAMINOPHEN 325 MG/1
650 TABLET ORAL ONCE
Status: COMPLETED | OUTPATIENT
Start: 2017-01-14 | End: 2017-01-14

## 2017-01-14 RX ORDER — EPINEPHRINE 1 MG/ML
1 INJECTION, SOLUTION INTRACARDIAC; INTRAMUSCULAR; INTRAVENOUS; SUBCUTANEOUS ONCE AS NEEDED
Status: ACTIVE | OUTPATIENT
Start: 2017-01-14 | End: 2017-01-14

## 2017-01-14 RX ORDER — ACETAMINOPHEN 325 MG/1
650 TABLET ORAL ONCE AS NEEDED
Status: ACTIVE | OUTPATIENT
Start: 2017-01-14 | End: 2017-01-14

## 2017-01-14 RX ORDER — DIPHENHYDRAMINE HCL 50 MG
50 CAPSULE ORAL ONCE AS NEEDED
Status: ACTIVE | OUTPATIENT
Start: 2017-01-14 | End: 2017-01-14

## 2017-01-14 RX ORDER — DIPHENHYDRAMINE HCL 25 MG
25 CAPSULE ORAL ONCE
Status: COMPLETED | OUTPATIENT
Start: 2017-01-14 | End: 2017-01-14

## 2017-01-14 RX ADMIN — NYSTATIN 500000 UNITS: 500000 SUSPENSION ORAL at 06:01

## 2017-01-14 RX ADMIN — LINEZOLID 600 MG: 600 TABLET, FILM COATED ORAL at 09:01

## 2017-01-14 RX ADMIN — FAMOTIDINE 20 MG: 20 TABLET, FILM COATED ORAL at 09:01

## 2017-01-14 RX ADMIN — Medication 100 MG: at 08:01

## 2017-01-14 RX ADMIN — LEVOTHYROXINE SODIUM 112 MCG: 112 TABLET ORAL at 06:01

## 2017-01-14 RX ADMIN — POTASSIUM PHOSPHATE, MONOBASIC AND POTASSIUM PHOSPHATE, DIBASIC 15 MMOL: 224; 236 INJECTION, SOLUTION, CONCENTRATE INTRAVENOUS at 09:01

## 2017-01-14 RX ADMIN — NYSTATIN 500000 UNITS: 500000 SUSPENSION ORAL at 01:01

## 2017-01-14 RX ADMIN — MAGNESIUM OXIDE TAB 400 MG (241.3 MG ELEMENTAL MG) 400 MG: 400 (241.3 MG) TAB at 08:01

## 2017-01-14 RX ADMIN — DIBASIC SODIUM PHOSPHATE, MONOBASIC POTASSIUM PHOSPHATE AND MONOBASIC SODIUM PHOSPHATE 2 TABLET: 852; 155; 130 TABLET ORAL at 06:01

## 2017-01-14 RX ADMIN — NYSTATIN 500000 UNITS: 500000 SUSPENSION ORAL at 09:01

## 2017-01-14 RX ADMIN — VALGANCICLOVIR 450 MG: 450 TABLET, FILM COATED ORAL at 08:01

## 2017-01-14 RX ADMIN — TACROLIMUS 8 MG: 1 CAPSULE, GELATIN COATED ORAL at 06:01

## 2017-01-14 RX ADMIN — Medication 3 ML: at 10:01

## 2017-01-14 RX ADMIN — THERA TABS 1 TABLET: TAB at 08:01

## 2017-01-14 RX ADMIN — OXYCODONE HYDROCHLORIDE AND ACETAMINOPHEN 1 TABLET: 5; 325 TABLET ORAL at 06:01

## 2017-01-14 RX ADMIN — MYCOPHENOLATE MOFETIL 500 MG: 250 CAPSULE ORAL at 09:01

## 2017-01-14 RX ADMIN — ACETAMINOPHEN 650 MG: 325 TABLET ORAL at 03:01

## 2017-01-14 RX ADMIN — TACROLIMUS 9 MG: 1 CAPSULE, GELATIN COATED ORAL at 08:01

## 2017-01-14 RX ADMIN — SODIUM BICARBONATE 650 MG TABLET 1950 MG: at 09:01

## 2017-01-14 RX ADMIN — Medication 3 ML: at 03:01

## 2017-01-14 RX ADMIN — DIBASIC SODIUM PHOSPHATE, MONOBASIC POTASSIUM PHOSPHATE AND MONOBASIC SODIUM PHOSPHATE 2 TABLET: 852; 155; 130 TABLET ORAL at 01:01

## 2017-01-14 RX ADMIN — TBO-FILGRASTIM 480 MCG: 480 INJECTION, SOLUTION SUBCUTANEOUS at 01:01

## 2017-01-14 RX ADMIN — NYSTATIN 500000 UNITS: 500000 SUSPENSION ORAL at 08:01

## 2017-01-14 RX ADMIN — DIBASIC SODIUM PHOSPHATE, MONOBASIC POTASSIUM PHOSPHATE AND MONOBASIC SODIUM PHOSPHATE 2 TABLET: 852; 155; 130 TABLET ORAL at 09:01

## 2017-01-14 RX ADMIN — SODIUM BICARBONATE 650 MG TABLET 1950 MG: at 08:01

## 2017-01-14 RX ADMIN — LINEZOLID 600 MG: 600 TABLET, FILM COATED ORAL at 01:01

## 2017-01-14 RX ADMIN — ANTI-THYMOCYTE GLOBULIN (RABBIT) 50 MG: 5 INJECTION, POWDER, LYOPHILIZED, FOR SOLUTION INTRAVENOUS at 03:01

## 2017-01-14 RX ADMIN — Medication 3 ML: at 06:01

## 2017-01-14 RX ADMIN — MYCOPHENOLATE MOFETIL 500 MG: 250 CAPSULE ORAL at 08:01

## 2017-01-14 RX ADMIN — SULFAMETHOXAZOLE AND TRIMETHOPRIM 1 TABLET: 400; 80 TABLET ORAL at 08:01

## 2017-01-14 RX ADMIN — DIPHENHYDRAMINE HYDROCHLORIDE 25 MG: 25 CAPSULE ORAL at 03:01

## 2017-01-14 RX ADMIN — PREDNISONE 20 MG: 20 TABLET ORAL at 08:01

## 2017-01-14 NOTE — PROGRESS NOTES
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 Will place patient on contact isolation.

## 2017-01-14 NOTE — PLAN OF CARE
Problem: Patient Care Overview  Goal: Plan of Care Review  Outcome: Ongoing (interventions implemented as appropriate)     Pt AAOx4.      Pt received half dose of dose #3 of 5-7 of Thymo. Pt tolerated without incident.      VSS. Afebrile, HR 80's, 's/80's, satting 100% on room air.      Pt ambulated in halls independently. Steady on feet.   Pt received 5 mg percocet for c/o 3/10 pain to R neck/shoulder.      Skin is intact. L UA fistula +,+. No complications.   R FA 20g CDI.      Pt remained free from falls or injury thus far.   Bed is in low/ locked position, side rails are up x 2, call light is in reach.   Will continue to monitor.

## 2017-01-15 PROBLEM — E83.39 HYPOPHOSPHATEMIA: Status: ACTIVE | Noted: 2017-01-15

## 2017-01-15 LAB
ALBUMIN SERPL BCP-MCNC: 3 G/DL
ALP SERPL-CCNC: 92 U/L
ALT SERPL W/O P-5'-P-CCNC: 77 U/L
ANION GAP SERPL CALC-SCNC: 7 MMOL/L
ANISOCYTOSIS BLD QL SMEAR: SLIGHT
ANISOCYTOSIS BLD QL SMEAR: SLIGHT
AST SERPL-CCNC: 21 U/L
BASOPHILS # BLD AUTO: ABNORMAL K/UL
BASOPHILS # BLD AUTO: ABNORMAL K/UL
BASOPHILS NFR BLD: 0 %
BASOPHILS NFR BLD: 0 %
BILIRUB SERPL-MCNC: 0.5 MG/DL
BUN SERPL-MCNC: 17 MG/DL
CALCIUM SERPL-MCNC: 8.3 MG/DL
CHLORIDE SERPL-SCNC: 110 MMOL/L
CO2 SERPL-SCNC: 23 MMOL/L
CREAT SERPL-MCNC: 1.2 MG/DL
DIFFERENTIAL METHOD: ABNORMAL
DIFFERENTIAL METHOD: ABNORMAL
EOSINOPHIL # BLD AUTO: ABNORMAL K/UL
EOSINOPHIL # BLD AUTO: ABNORMAL K/UL
EOSINOPHIL NFR BLD: 0 %
EOSINOPHIL NFR BLD: 0 %
ERYTHROCYTE [DISTWIDTH] IN BLOOD BY AUTOMATED COUNT: 16.4 %
ERYTHROCYTE [DISTWIDTH] IN BLOOD BY AUTOMATED COUNT: 16.5 %
EST. GFR  (AFRICAN AMERICAN): >60 ML/MIN/1.73 M^2
EST. GFR  (NON AFRICAN AMERICAN): >60 ML/MIN/1.73 M^2
GLUCOSE SERPL-MCNC: 90 MG/DL
HCT VFR BLD AUTO: 30.6 %
HCT VFR BLD AUTO: 32.2 %
HGB BLD-MCNC: 10.3 G/DL
HGB BLD-MCNC: 10.9 G/DL
HYPOCHROMIA BLD QL SMEAR: ABNORMAL
LYMPHOCYTES # BLD AUTO: ABNORMAL K/UL
LYMPHOCYTES # BLD AUTO: ABNORMAL K/UL
LYMPHOCYTES NFR BLD: 0 %
LYMPHOCYTES NFR BLD: 0 %
MAGNESIUM SERPL-MCNC: 1.6 MG/DL
MCH RBC QN AUTO: 30.9 PG
MCH RBC QN AUTO: 30.9 PG
MCHC RBC AUTO-ENTMCNC: 33.7 %
MCHC RBC AUTO-ENTMCNC: 33.9 %
MCV RBC AUTO: 91 FL
MCV RBC AUTO: 92 FL
MONOCYTES # BLD AUTO: ABNORMAL K/UL
MONOCYTES # BLD AUTO: ABNORMAL K/UL
MONOCYTES NFR BLD: 0 %
MONOCYTES NFR BLD: 1 %
MYELOCYTES NFR BLD MANUAL: 1 %
NEUTROPHILS NFR BLD: 91 %
NEUTROPHILS NFR BLD: 92 %
NEUTS BAND NFR BLD MANUAL: 6 %
NEUTS BAND NFR BLD MANUAL: 9 %
OVALOCYTES BLD QL SMEAR: ABNORMAL
PHOSPHATE SERPL-MCNC: 2.2 MG/DL
PLATELET # BLD AUTO: 101 K/UL
PLATELET # BLD AUTO: 101 K/UL
PLATELET BLD QL SMEAR: ABNORMAL
PMV BLD AUTO: 11.8 FL
PMV BLD AUTO: 12.1 FL
POIKILOCYTOSIS BLD QL SMEAR: SLIGHT
POLYCHROMASIA BLD QL SMEAR: ABNORMAL
POTASSIUM SERPL-SCNC: 3.8 MMOL/L
PROT SERPL-MCNC: 6 G/DL
RBC # BLD AUTO: 3.33 M/UL
RBC # BLD AUTO: 3.53 M/UL
SODIUM SERPL-SCNC: 140 MMOL/L
TACROLIMUS BLD-MCNC: 14.2 NG/ML
WBC # BLD AUTO: 11.89 K/UL
WBC # BLD AUTO: 12.36 K/UL

## 2017-01-15 PROCEDURE — 25000003 PHARM REV CODE 250: Performed by: NURSE PRACTITIONER

## 2017-01-15 PROCEDURE — 63600175 PHARM REV CODE 636 W HCPCS: Performed by: NURSE PRACTITIONER

## 2017-01-15 PROCEDURE — 85027 COMPLETE CBC AUTOMATED: CPT | Mod: 91

## 2017-01-15 PROCEDURE — 99233 SBSQ HOSP IP/OBS HIGH 50: CPT | Mod: GC,,, | Performed by: INTERNAL MEDICINE

## 2017-01-15 PROCEDURE — 63600175 PHARM REV CODE 636 W HCPCS: Performed by: INTERNAL MEDICINE

## 2017-01-15 PROCEDURE — 80053 COMPREHEN METABOLIC PANEL: CPT

## 2017-01-15 PROCEDURE — 25000003 PHARM REV CODE 250: Performed by: STUDENT IN AN ORGANIZED HEALTH CARE EDUCATION/TRAINING PROGRAM

## 2017-01-15 PROCEDURE — 25000003 PHARM REV CODE 250: Performed by: PHYSICIAN ASSISTANT

## 2017-01-15 PROCEDURE — 84100 ASSAY OF PHOSPHORUS: CPT

## 2017-01-15 PROCEDURE — 25000003 PHARM REV CODE 250: Performed by: INTERNAL MEDICINE

## 2017-01-15 PROCEDURE — 80197 ASSAY OF TACROLIMUS: CPT

## 2017-01-15 PROCEDURE — 36415 COLL VENOUS BLD VENIPUNCTURE: CPT

## 2017-01-15 PROCEDURE — 83735 ASSAY OF MAGNESIUM: CPT

## 2017-01-15 PROCEDURE — 85007 BL SMEAR W/DIFF WBC COUNT: CPT | Mod: 91

## 2017-01-15 PROCEDURE — 63600175 PHARM REV CODE 636 W HCPCS: Performed by: STUDENT IN AN ORGANIZED HEALTH CARE EDUCATION/TRAINING PROGRAM

## 2017-01-15 PROCEDURE — 20600001 HC STEP DOWN PRIVATE ROOM

## 2017-01-15 RX ORDER — ACETAMINOPHEN 325 MG/1
650 TABLET ORAL ONCE
Status: COMPLETED | OUTPATIENT
Start: 2017-01-15 | End: 2017-01-15

## 2017-01-15 RX ORDER — ACETAMINOPHEN 325 MG/1
650 TABLET ORAL ONCE AS NEEDED
Status: DISPENSED | OUTPATIENT
Start: 2017-01-15 | End: 2017-01-15

## 2017-01-15 RX ORDER — DIPHENHYDRAMINE HCL 25 MG
25 CAPSULE ORAL ONCE
Status: COMPLETED | OUTPATIENT
Start: 2017-01-15 | End: 2017-01-15

## 2017-01-15 RX ORDER — EPINEPHRINE 1 MG/ML
1 INJECTION, SOLUTION INTRACARDIAC; INTRAMUSCULAR; INTRAVENOUS; SUBCUTANEOUS ONCE AS NEEDED
Status: ACTIVE | OUTPATIENT
Start: 2017-01-15 | End: 2017-01-15

## 2017-01-15 RX ORDER — LINEZOLID 2 MG/ML
600 INJECTION, SOLUTION INTRAVENOUS
Status: DISCONTINUED | OUTPATIENT
Start: 2017-01-15 | End: 2017-01-15

## 2017-01-15 RX ORDER — DIPHENHYDRAMINE HCL 50 MG
50 CAPSULE ORAL ONCE AS NEEDED
Status: DISPENSED | OUTPATIENT
Start: 2017-01-15 | End: 2017-01-15

## 2017-01-15 RX ADMIN — DIBASIC SODIUM PHOSPHATE, MONOBASIC POTASSIUM PHOSPHATE AND MONOBASIC SODIUM PHOSPHATE 2 TABLET: 852; 155; 130 TABLET ORAL at 06:01

## 2017-01-15 RX ADMIN — MYCOPHENOLATE MOFETIL 500 MG: 250 CAPSULE ORAL at 08:01

## 2017-01-15 RX ADMIN — Medication 100 MG: at 08:01

## 2017-01-15 RX ADMIN — PREDNISONE 20 MG: 20 TABLET ORAL at 08:01

## 2017-01-15 RX ADMIN — NYSTATIN 500000 UNITS: 500000 SUSPENSION ORAL at 08:01

## 2017-01-15 RX ADMIN — SODIUM BICARBONATE 650 MG TABLET 1950 MG: at 09:01

## 2017-01-15 RX ADMIN — TACROLIMUS 7 MG: 5 CAPSULE, GELATIN COATED ORAL at 06:01

## 2017-01-15 RX ADMIN — LINEZOLID 600 MG: 600 TABLET, FILM COATED ORAL at 09:01

## 2017-01-15 RX ADMIN — SULFAMETHOXAZOLE AND TRIMETHOPRIM 1 TABLET: 400; 80 TABLET ORAL at 08:01

## 2017-01-15 RX ADMIN — MYCOPHENOLATE MOFETIL 500 MG: 250 CAPSULE ORAL at 09:01

## 2017-01-15 RX ADMIN — LINEZOLID 600 MG: 600 TABLET, FILM COATED ORAL at 08:01

## 2017-01-15 RX ADMIN — NYSTATIN 500000 UNITS: 500000 SUSPENSION ORAL at 09:01

## 2017-01-15 RX ADMIN — Medication 3 ML: at 03:01

## 2017-01-15 RX ADMIN — DIPHENHYDRAMINE HYDROCHLORIDE 25 MG: 25 CAPSULE ORAL at 02:01

## 2017-01-15 RX ADMIN — THERA TABS 1 TABLET: TAB at 08:01

## 2017-01-15 RX ADMIN — TACROLIMUS 8 MG: 1 CAPSULE, GELATIN COATED ORAL at 08:01

## 2017-01-15 RX ADMIN — ANTI-THYMOCYTE GLOBULIN (RABBIT) 100 MG: 5 INJECTION, POWDER, LYOPHILIZED, FOR SOLUTION INTRAVENOUS at 03:01

## 2017-01-15 RX ADMIN — DIBASIC SODIUM PHOSPHATE, MONOBASIC POTASSIUM PHOSPHATE AND MONOBASIC SODIUM PHOSPHATE 3 TABLET: 852; 155; 130 TABLET ORAL at 03:01

## 2017-01-15 RX ADMIN — VALGANCICLOVIR 450 MG: 450 TABLET, FILM COATED ORAL at 08:01

## 2017-01-15 RX ADMIN — SODIUM BICARBONATE 650 MG TABLET 1950 MG: at 08:01

## 2017-01-15 RX ADMIN — LEVOTHYROXINE SODIUM 112 MCG: 112 TABLET ORAL at 06:01

## 2017-01-15 RX ADMIN — DIBASIC SODIUM PHOSPHATE, MONOBASIC POTASSIUM PHOSPHATE AND MONOBASIC SODIUM PHOSPHATE 3 TABLET: 852; 155; 130 TABLET ORAL at 09:01

## 2017-01-15 RX ADMIN — HEPARIN SODIUM 5000 UNITS: 5000 INJECTION, SOLUTION INTRAVENOUS; SUBCUTANEOUS at 09:01

## 2017-01-15 RX ADMIN — FAMOTIDINE 20 MG: 20 TABLET, FILM COATED ORAL at 09:01

## 2017-01-15 RX ADMIN — ACETAMINOPHEN 650 MG: 325 TABLET ORAL at 02:01

## 2017-01-15 RX ADMIN — OXYCODONE HYDROCHLORIDE AND ACETAMINOPHEN 1 TABLET: 5; 325 TABLET ORAL at 06:01

## 2017-01-15 RX ADMIN — MAGNESIUM OXIDE TAB 400 MG (241.3 MG ELEMENTAL MG) 400 MG: 400 (241.3 MG) TAB at 08:01

## 2017-01-15 RX ADMIN — NYSTATIN 500000 UNITS: 500000 SUSPENSION ORAL at 06:01

## 2017-01-15 RX ADMIN — NYSTATIN 500000 UNITS: 500000 SUSPENSION ORAL at 12:01

## 2017-01-15 RX ADMIN — Medication 3 ML: at 06:01

## 2017-01-15 RX ADMIN — Medication 12.5 MG: at 09:01

## 2017-01-15 NOTE — PROGRESS NOTES
Name: Alin Burkett  Medical Record Number: [id number]  Date of Service: 01/15/2017  Note By: Michelle Zuleta MD    RENAL TRANSPLANT PROGRESS NOTE      Reason for Follow-up: Reassessment of kidney transplant recipient and management of immunosuppression.    Summary: 66 y.o. male with history of ESRD secondary to decreased renal mass (congenitally absent left kidney) and HTN who was on dialysis since 6/16/10 until receiving PHS increased risk DDRT (pumped kidney, Campath induction, KDPI 36%, WIT 30 minutes, CIT 14 hours and 14 minutes, donor RPR positive thus patient completed PCN x3, CMV D+/R+) on 16 with corina Cr since transplant being 1.3 on 16 but noted to have DIVINE on outpatient labs up to 2.6 on 17 and was admitted for further work-up however he had improvement in Cr without intervention. He did have biopsy performed on 17 which resulted with 21 glomeruli, none globally sclerosed, <5% interstitial fibrosis, no ACR, c4d negative, AVR CCT Type 2 (V1 lesion) thus he was re-admitted on 17 for treatment of AVR. Unable to place central line thus he is receiving THYMO via peripheral IV.    Interval History: Patient states he feels well. Afebrile. Tolerated THYMO dose #4 (half dose) yesterday without complications. Urine cultures came back positive for VRE. Started on po linezolid.    PMHx:  Past Medical History   Diagnosis Date    Acidosis     Adrenal adenoma     Anemia associated with chronic renal failure     Arrhythmia, onset 2015    Awaiting organ transplant status 2013    Basal cell carcinoma 2012     left nasal tip    Blood type B+ 2013    Cancer     Celiac artery dissection     Chronic diarrhea     Chronic urethral stricture     Congenital absence of kidney      left    -donor kidney transplant 16      Induced w Campath 30 mg IV intraoperatively & SoluMedrol 875 mg total over 3 days.  Renal allograft biopsy 17 (DIVINE): 21 glomeruli,  none globally sclerosed, <5% interstitial fibrosis, no ACR, c4d negative, AVR CCT Type 2 (V1 lesion); plan THYMO     Dissecting aortic aneurysm (any part), abdominal     Encounter for blood transfusion     ESRD (end stage renal disease) 06/16/2010    H/O: urethral stricture     History of AAA (abdominal aortic aneurysm) repair     Hypertension     Hypokalemia     Hypothyroidism 1/10/2014    Inguinal hernia bilateral, non-recurrent     Kidney stones     Organ transplant candidate 11/26/2013    Plantar warts 1/10/2014    S/P kidney transplant     Secondary hyperparathyroidism, renal     Thyroid disease        Medications:   Scheduled Meds:   acetaminophen  650 mg Oral Once    Thymoglobulin 1.5mg/kg, hydrocortisone 20mg, heparin 1000 units in NS 500ml (Peripheral line)  1.5 mg/kg (Order-Specific) Intravenous Once    diphenhydrAMINE  25 mg Oral Once    famotidine  20 mg Oral QHS    heparin (porcine)  5,000 Units Subcutaneous Q8H    k phos di & mono-sod phos mono  750 mg Oral TID    ketoconazole  100 mg Oral Daily    levothyroxine  112 mcg Oral Before breakfast    linezolid  600 mg Oral Q12H    magnesium oxide  400 mg Oral Daily    metoprolol tartrate  12.5 mg Oral BID    multivitamin  1 tablet Oral Daily    mycophenolate  500 mg Oral BID    nystatin  500,000 Units Oral QID (WM & HS)    predniSONE  20 mg Oral Daily    sodium bicarbonate  1,950 mg Oral BID    sodium chloride 0.9%  3 mL Intravenous Q8H    sulfamethoxazole-trimethoprim 400-80mg  1 tablet Oral Daily    tacrolimus  8 mg Oral BID    valganciclovir  450 mg Oral Daily       Continuous Infusions:     PRN Meds:.acetaminophen, diphenhydrAMINE, EPINEPHrine, hydrocortisone sodium succinate, ondansetron, oxycodone-acetaminophen    Review of Systems:   10 point review of systems was conducted and was negative except as mentioned in the HPI.    Physical Exam:  Vitals:  Vitals:    01/15/17 1220   BP: 126/82   Pulse: 77   Resp: 15    Temp: 98.1 °F (36.7 °C)       Temp:  [98 °F (36.7 °C)-99 °F (37.2 °C)] 98.1 °F (36.7 °C)  Pulse:  [] 77  Resp:  [14-18] 15  SpO2:  [99 %-100 %] 99 %  BP: (115-141)/(65-93) 126/82    I/Os:  Ins: 2910  Outs: 2950  Net: -40  Net for hospitalization: -485    Exam  General: No acute distress, well groomed, alert and oriented x 3  HEENT: Normocephalic, atraumatic, EOM's intact bilaterally, external inspection of ears and nose normal, moist mucous membranes, no oral ulcerations/lesions  Neck: Supple, symmetrical, trachea midline, no thyromegaly, no JVD  Respiratory: Clear to auscultation bilaterally, respirations unlabored, no rales/rhonchi/wheezing  Cardiovacular: Regular rate and rhythm, S1, S2 normal, no murmurs, rubs or gallops  Gastrointestinal: Soft, non-tender, bowel sounds normal, no hepatosplenomegaly  Renal allograft exam: No tenderness, no bruits, normal exam  Musculoskeletal: No knee or ankle joint tenderness or swelling.   Extremities: No clubbing or cyanosis of bilateral upper extremities; no lower extremity edema bilaterally, radial pulses 2+ bilaterally, symmetric  Skin: warm and dry; no rash on exposed skin  Neurologic: CN grossly intact    Labs:  Results for TANG LAUREN (MRN [id number]) as of 1/15/2017 12:45   Ref. Range 1/14/2017 04:32 1/15/2017 04:51 1/15/2017 10:58   WBC Latest Ref Range: 3.90 - 12.70 K/uL 1.80 (LL) 11.89 12.36   RBC Latest Ref Range: 4.60 - 6.20 M/uL 3.62 (L) 3.53 (L) 3.33 (L)   Hemoglobin Latest Ref Range: 14.0 - 18.0 g/dL 11.2 (L) 10.9 (L) 10.3 (L)   Hematocrit Latest Ref Range: 40.0 - 54.0 % 33.2 (L) 32.2 (L) 30.6 (L)   MCV Latest Ref Range: 82 - 98 fL 92 91 92   MCH Latest Ref Range: 27.0 - 31.0 pg 30.9 30.9 30.9   MCHC Latest Ref Range: 32.0 - 36.0 % 33.7 33.9 33.7   RDW Latest Ref Range: 11.5 - 14.5 % 16.4 (H) 16.5 (H) 16.4 (H)   Platelets Latest Ref Range: 150 - 350 K/uL 111 (L) 101 (L) 101 (L)   MPV Latest Ref Range: 9.2 - 12.9 fL 11.8 12.1 11.8   Gran% Latest Ref  Range: 38.0 - 73.0 % 97.0 (H) 91.0 (H) 92.0 (H)   Lymph% Latest Ref Range: 18.0 - 48.0 % 0.0 (L) 0.0 (L) 0.0 (L)   Lymph # Latest Ref Range: 1.0 - 4.8 K/uL Test Not Performed CANCELED CANCELED   Mono% Latest Ref Range: 4.0 - 15.0 % 2.0 (L) 0.0 (L) 1.0 (L)   Mono # Latest Ref Range: 0.3 - 1.0 K/uL Test Not Performed CANCELED CANCELED   Eosinophil% Latest Ref Range: 0.0 - 8.0 % 0.0 0.0 0.0   Eos # Latest Ref Range: 0.0 - 0.5 K/uL Test Not Performed CANCELED CANCELED   Basophil% Latest Ref Range: 0.0 - 1.9 % 0.0 0.0 0.0   Baso # Latest Ref Range: 0.00 - 0.20 K/uL Test Not Performed CANCELED CANCELED   BANDS Latest Units: % 1.0 9.0 6.0   Myelocytes Latest Units: %   1.0   Iron Latest Ref Range: 45 - 160 ug/dL 100     TIBC Latest Ref Range: 250 - 450 ug/dL 277     Saturated Iron Latest Ref Range: 20 - 50 % 36     Transferrin Latest Ref Range: 200 - 375 mg/dL 187 (L)     Ferritin Latest Ref Range: 20.0 - 300.0 ng/mL 2978 (H)       Results for TANG LAUREN (MRN [id number]) as of 1/15/2017 12:45   Ref. Range 1/13/2017 04:26 1/14/2017 04:32 1/15/2017 04:51   Sodium Latest Ref Range: 136 - 145 mmol/L 140 141 140   Potassium Latest Ref Range: 3.5 - 5.1 mmol/L 3.8 3.6 3.8   Chloride Latest Ref Range: 95 - 110 mmol/L 111 (H) 113 (H) 110   CO2 Latest Ref Range: 23 - 29 mmol/L 21 (L) 23 23   Anion Gap Latest Ref Range: 8 - 16 mmol/L 8 5 (L) 7 (L)   BUN, Bld Latest Ref Range: 8 - 23 mg/dL 26 (H) 23 17   Creatinine Latest Ref Range: 0.5 - 1.4 mg/dL 1.2 1.1 1.2   eGFR if non African American Latest Ref Range: >60 mL/min/1.73 m^2 >60.0 >60.0 >60.0   eGFR if African American Latest Ref Range: >60 mL/min/1.73 m^2 >60.0 >60.0 >60.0   Glucose Latest Ref Range: 70 - 110 mg/dL 77 119 (H) 90   Calcium Latest Ref Range: 8.7 - 10.5 mg/dL 7.9 (L) 8.3 (L) 8.3 (L)   Phosphorus Latest Ref Range: 2.7 - 4.5 mg/dL  1.6 (L) 2.2 (L)   Magnesium Latest Ref Range: 1.6 - 2.6 mg/dL 1.7 2.0 1.6   Alkaline Phosphatase Latest Ref Range: 55 - 135 U/L 86 93 92    Total Protein Latest Ref Range: 6.0 - 8.4 g/dL 6.0 6.7 6.0   Albumin Latest Ref Range: 3.5 - 5.2 g/dL 3.0 (L) 3.3 (L) 3.0 (L)   Total Bilirubin Latest Ref Range: 0.1 - 1.0 mg/dL 0.3 0.3 0.5   AST Latest Ref Range: 10 - 40 U/L 37 28 21   ALT Latest Ref Range: 10 - 44 U/L 100 (H) 98 (H) 77 (H)     Imaging Studies:    ?    Assessment/Plan:  66 y.o. male with:    # History of ESRD secondary to decreased renal mass (congenitally absent left kidney) and HTN who was on dialysis since 6/16/10 until receiving PHS increased risk DDRT (pumped kidney, Campath induction, KDPI 36%, WIT 30 minutes, CIT 14 hours and 14 minutes, donor RPR positive thus patient completed PCN x3, CMV D+/R+) on 11/26/16: corina Cr since transplant being 1.3 on 12/27/16 but noted to have DIVINE on outpatient labs up to 2.6 on 1/4/17 and was admitted for further work-up however he had improvement in Cr without intervention. He did have biopsy performed on 1/6/17 which resulted with 21 glomeruli, none globally sclerosed, <5% interstitial fibrosis, no ACR, c4d negative, AVR CCT Type 2 (V1 lesion)  - Cr improved at 1.2  - will administer THYMO dose #5 today (full dose)  - CD3 count on Monday 1/16/2017.    # Immunosuppression Management:  - Prograf slightly decreased on 1/14/2017; goal FK-506 trough level is 8-10; level today 14.2 (10 hour level). Lowered dose to 7 mg BID.  - continue Cellcept 500 mg BID; once THYMO completed would aim to uptitrate to 1000 mg BID   - continue prednisone 20 mg daily  - continue to monitor for toxicities from immunosuppressive medications    # Immune Prophylaxis:  - continue Bactrim for PJP prophylaxis for 1 year  - continue Valcyte for CMV prophylaxis for 3 months; CMV D+/R+  - continue nystatin for thrush prophylaxis for 1 month    # Enterococcus UTI: patient noted to have fever of 100.5 on 1/11/17 thus obtained urine culture which is growing >100,000 CFU of VR enterococcus from urine culture on 1/13/17  - disntinue vancomycin  IV   - started on linezolid 600 mg BID to complete 7 days of therapy; end of therapy date 1/20/17    # Anemia of chronic disease: Hb currently in the 10-11 range  - continue to monitor    # Leukopenia: likely related to THYMO; CMV PCR negative from 1/10/17   - administer Neupogen 480 mcg SC on 1/14/2017  - [facility] WNL today  - continue to monitor     # Thrombocytopenia: likely related to THYMO   - continue to monitor     # HTN: BPs stable overall   - continue metoprolol 12.5 mg BID   - continue to monitor    # Hypothyroidism:   - continue synthroid     # Metabolic acidosis: bicarb improved at 23  - continue sodium bicarbonate 1950 mg BID     # Lytes:  - continue to monitor corrected calcium, potassium, phos, and magnesium --> currently within normal  - hypophosphatemia --> increased PO K-phos to 750 mg TID; encourage patient regarding higher phos diet     Michelle Zuleta MD  Abdominal Transplant surgery fellow

## 2017-01-15 NOTE — PLAN OF CARE
Problem: Patient Care Overview  Goal: Plan of Care Review  Outcome: Ongoing (interventions implemented as appropriate)     Pt AAOx4 with wife at the bedside.   Spouse is attentive to and supportive of pt and actively involved in pt care.  Pt received half dose of dose #4 of 5-7 of Thymo. Pt tolerated without incident.      VSS. Afebrile, HR 80's, 's-130's/70's-80's, satting 100% on room air.       L UA fistula +,+. No complications.   R FA 20g CDI. Saline locked.    Contact precautions maintained for VRE in urine. Pt started on Zyvox PO.     Pt remained free from falls or injury thus far.   Bed is in low/ locked position, side rails are up x 2, call light is in reach.   Will continue to monitor.

## 2017-01-15 NOTE — PLAN OF CARE
Problem: Patient Care Overview  Goal: Plan of Care Review  Outcome: Ongoing (interventions implemented as appropriate)  Patient's VSS and in NAD today. Patient in bed for most of day, but is able to walk independently around room ad [place]. Patient received potassium phosphate today as phosphate was 1.6 this morning. Thymo dose 4 started this afternoon. Patient tolerating medication with no reported side effects. Still putting out clear yellow urine (almost a liter today). Appetite is excellent. Patient is positive for VRE in urine, so was place on contact isolation today. Patient remains AAOx4, calm and cooperative. Free from injury, falls, and skin breakdown.

## 2017-01-16 VITALS
SYSTOLIC BLOOD PRESSURE: 131 MMHG | HEART RATE: 79 BPM | BODY MASS INDEX: 18.77 KG/M2 | OXYGEN SATURATION: 97 % | TEMPERATURE: 99 F | WEIGHT: 134.06 LBS | DIASTOLIC BLOOD PRESSURE: 79 MMHG | RESPIRATION RATE: 16 BRPM | HEIGHT: 71 IN

## 2017-01-16 LAB
ABSOLUTE CD3: 2 CELLS/UL (ref 700–2100)
ALBUMIN SERPL BCP-MCNC: 2.9 G/DL
ALP SERPL-CCNC: 91 U/L
ALT SERPL W/O P-5'-P-CCNC: 61 U/L
ANION GAP SERPL CALC-SCNC: 9 MMOL/L
ANISOCYTOSIS BLD QL SMEAR: SLIGHT
AST SERPL-CCNC: 14 U/L
BACTERIA BLD CULT: NORMAL
BACTERIA BLD CULT: NORMAL
BASOPHILS NFR BLD: 0 %
BILIRUB SERPL-MCNC: 0.3 MG/DL
BILIRUB UR QL STRIP: NEGATIVE
BUN SERPL-MCNC: 20 MG/DL
CALCIUM SERPL-MCNC: 8.4 MG/DL
CD3%: 66 % (ref 55–83)
CHLORIDE SERPL-SCNC: 112 MMOL/L
CLARITY UR REFRACT.AUTO: CLEAR
CO2 SERPL-SCNC: 19 MMOL/L
COLOR UR AUTO: YELLOW
CREAT SERPL-MCNC: 1.1 MG/DL
DIFFERENTIAL METHOD: ABNORMAL
EOSINOPHIL NFR BLD: 0 %
ERYTHROCYTE [DISTWIDTH] IN BLOOD BY AUTOMATED COUNT: 16.4 %
EST. GFR  (AFRICAN AMERICAN): >60 ML/MIN/1.73 M^2
EST. GFR  (NON AFRICAN AMERICAN): >60 ML/MIN/1.73 M^2
GLUCOSE SERPL-MCNC: 103 MG/DL
GLUCOSE UR QL STRIP: NEGATIVE
HCT VFR BLD AUTO: 31 %
HGB BLD-MCNC: 10.4 G/DL
HGB UR QL STRIP: NEGATIVE
HYPOCHROMIA BLD QL SMEAR: ABNORMAL
KETONES UR QL STRIP: NEGATIVE
LEUKOCYTE ESTERASE UR QL STRIP: NEGATIVE
LYMPHOCYTES NFR BLD: 3 %
MAGNESIUM SERPL-MCNC: 1.8 MG/DL
MCH RBC QN AUTO: 30.4 PG
MCHC RBC AUTO-ENTMCNC: 33.5 %
MCV RBC AUTO: 91 FL
MONOCYTES NFR BLD: 1 %
NEUTROPHILS NFR BLD: 93 %
NEUTS BAND NFR BLD MANUAL: 3 %
NITRITE UR QL STRIP: NEGATIVE
OVALOCYTES BLD QL SMEAR: ABNORMAL
PH UR STRIP: 7 [PH] (ref 5–8)
PHOSPHATE SERPL-MCNC: 1.8 MG/DL
PLATELET # BLD AUTO: 82 K/UL
PLATELET BLD QL SMEAR: ABNORMAL
PMV BLD AUTO: 10.9 FL
POIKILOCYTOSIS BLD QL SMEAR: SLIGHT
POLYCHROMASIA BLD QL SMEAR: ABNORMAL
POTASSIUM SERPL-SCNC: 3.6 MMOL/L
PROT SERPL-MCNC: 5.9 G/DL
PROT UR QL STRIP: NEGATIVE
RBC # BLD AUTO: 3.42 M/UL
SODIUM SERPL-SCNC: 140 MMOL/L
SP GR UR STRIP: 1.01 (ref 1–1.03)
TACROLIMUS BLD-MCNC: 14 NG/ML
URN SPEC COLLECT METH UR: NORMAL
UROBILINOGEN UR STRIP-ACNC: NEGATIVE EU/DL
WBC # BLD AUTO: 3.61 K/UL

## 2017-01-16 PROCEDURE — 25000003 PHARM REV CODE 250: Performed by: NURSE PRACTITIONER

## 2017-01-16 PROCEDURE — 25000003 PHARM REV CODE 250: Performed by: STUDENT IN AN ORGANIZED HEALTH CARE EDUCATION/TRAINING PROGRAM

## 2017-01-16 PROCEDURE — 25000003 PHARM REV CODE 250: Performed by: INTERNAL MEDICINE

## 2017-01-16 PROCEDURE — 85007 BL SMEAR W/DIFF WBC COUNT: CPT

## 2017-01-16 PROCEDURE — 83735 ASSAY OF MAGNESIUM: CPT

## 2017-01-16 PROCEDURE — 63600175 PHARM REV CODE 636 W HCPCS: Performed by: STUDENT IN AN ORGANIZED HEALTH CARE EDUCATION/TRAINING PROGRAM

## 2017-01-16 PROCEDURE — 63600175 PHARM REV CODE 636 W HCPCS: Performed by: INTERNAL MEDICINE

## 2017-01-16 PROCEDURE — 84100 ASSAY OF PHOSPHORUS: CPT

## 2017-01-16 PROCEDURE — 81003 URINALYSIS AUTO W/O SCOPE: CPT

## 2017-01-16 PROCEDURE — 80197 ASSAY OF TACROLIMUS: CPT

## 2017-01-16 PROCEDURE — 80053 COMPREHEN METABOLIC PANEL: CPT

## 2017-01-16 PROCEDURE — 63600175 PHARM REV CODE 636 W HCPCS: Performed by: NURSE PRACTITIONER

## 2017-01-16 PROCEDURE — 87086 URINE CULTURE/COLONY COUNT: CPT

## 2017-01-16 PROCEDURE — 36415 COLL VENOUS BLD VENIPUNCTURE: CPT

## 2017-01-16 PROCEDURE — 85027 COMPLETE CBC AUTOMATED: CPT

## 2017-01-16 PROCEDURE — 25000003 PHARM REV CODE 250: Performed by: PHYSICIAN ASSISTANT

## 2017-01-16 PROCEDURE — 86359 T CELLS TOTAL COUNT: CPT

## 2017-01-16 PROCEDURE — 99233 SBSQ HOSP IP/OBS HIGH 50: CPT | Mod: ,,, | Performed by: NURSE PRACTITIONER

## 2017-01-16 RX ORDER — NYSTATIN 100000 [USP'U]/ML
500000 SUSPENSION ORAL
Qty: 473 ML | Refills: 0 | Status: SHIPPED | OUTPATIENT
Start: 2017-01-16 | End: 2017-02-20 | Stop reason: ALTCHOICE

## 2017-01-16 RX ORDER — TACROLIMUS 1 MG/1
7 CAPSULE ORAL EVERY 12 HOURS
Qty: 450 CAPSULE | Refills: 11 | Status: SHIPPED | OUTPATIENT
Start: 2017-01-16 | End: 2017-01-20 | Stop reason: DRUGHIGH

## 2017-01-16 RX ORDER — KETOCONAZOLE 200 MG/1
100 TABLET ORAL DAILY
Qty: 15 TABLET | Refills: 5 | Status: SHIPPED | OUTPATIENT
Start: 2017-01-16 | End: 2017-07-21 | Stop reason: SDUPTHER

## 2017-01-16 RX ORDER — MYCOPHENOLATE MOFETIL 250 MG/1
750 CAPSULE ORAL 2 TIMES DAILY
Status: DISCONTINUED | OUTPATIENT
Start: 2017-01-16 | End: 2017-01-16 | Stop reason: HOSPADM

## 2017-01-16 RX ORDER — LINEZOLID 600 MG/1
600 TABLET, FILM COATED ORAL EVERY 12 HOURS
Qty: 14 TABLET | Refills: 0 | Status: SHIPPED | OUTPATIENT
Start: 2017-01-16 | End: 2017-01-23

## 2017-01-16 RX ORDER — PREDNISONE 10 MG/1
TABLET ORAL
Qty: 60 TABLET | Refills: 11 | Status: SHIPPED | OUTPATIENT
Start: 2017-01-16 | End: 2017-05-30 | Stop reason: SDUPTHER

## 2017-01-16 RX ORDER — MYCOPHENOLATE MOFETIL 250 MG/1
750 CAPSULE ORAL 2 TIMES DAILY
Qty: 180 CAPSULE | Refills: 11 | Status: SHIPPED | OUTPATIENT
Start: 2017-01-16 | End: 2017-08-18 | Stop reason: SDUPTHER

## 2017-01-16 RX ORDER — METOPROLOL TARTRATE 25 MG/1
12.5 TABLET, FILM COATED ORAL 2 TIMES DAILY
Qty: 30 TABLET | Refills: 11
Start: 2017-01-16 | End: 2017-09-25 | Stop reason: SDUPTHER

## 2017-01-16 RX ADMIN — Medication 100 MG: at 09:01

## 2017-01-16 RX ADMIN — DIBASIC SODIUM PHOSPHATE, MONOBASIC POTASSIUM PHOSPHATE AND MONOBASIC SODIUM PHOSPHATE 3 TABLET: 852; 155; 130 TABLET ORAL at 05:01

## 2017-01-16 RX ADMIN — DIBASIC SODIUM PHOSPHATE, MONOBASIC POTASSIUM PHOSPHATE AND MONOBASIC SODIUM PHOSPHATE 3 TABLET: 852; 155; 130 TABLET ORAL at 01:01

## 2017-01-16 RX ADMIN — LEVOTHYROXINE SODIUM 112 MCG: 112 TABLET ORAL at 05:01

## 2017-01-16 RX ADMIN — Medication 3 ML: at 05:01

## 2017-01-16 RX ADMIN — OXYCODONE HYDROCHLORIDE AND ACETAMINOPHEN 1 TABLET: 5; 325 TABLET ORAL at 06:01

## 2017-01-16 RX ADMIN — Medication 12.5 MG: at 09:01

## 2017-01-16 RX ADMIN — LINEZOLID 600 MG: 600 TABLET, FILM COATED ORAL at 09:01

## 2017-01-16 RX ADMIN — TACROLIMUS 7 MG: 5 CAPSULE, GELATIN COATED ORAL at 09:01

## 2017-01-16 RX ADMIN — MAGNESIUM OXIDE TAB 400 MG (241.3 MG ELEMENTAL MG) 400 MG: 400 (241.3 MG) TAB at 09:01

## 2017-01-16 RX ADMIN — MYCOPHENOLATE MOFETIL 500 MG: 250 CAPSULE ORAL at 09:01

## 2017-01-16 RX ADMIN — THERA TABS 1 TABLET: TAB at 09:01

## 2017-01-16 RX ADMIN — PREDNISONE 20 MG: 20 TABLET ORAL at 09:01

## 2017-01-16 RX ADMIN — SODIUM BICARBONATE 650 MG TABLET 1950 MG: at 09:01

## 2017-01-16 RX ADMIN — VALGANCICLOVIR 450 MG: 450 TABLET, FILM COATED ORAL at 09:01

## 2017-01-16 RX ADMIN — SULFAMETHOXAZOLE AND TRIMETHOPRIM 1 TABLET: 400; 80 TABLET ORAL at 09:01

## 2017-01-16 RX ADMIN — NYSTATIN 500000 UNITS: 500000 SUSPENSION ORAL at 05:01

## 2017-01-16 RX ADMIN — NYSTATIN 500000 UNITS: 500000 SUSPENSION ORAL at 01:01

## 2017-01-16 NOTE — PROGRESS NOTES
D/C note:    SW met with pt in pt's room today to discuss discharge and assess needs.  Pt presents alert and oriented x4 and reports coping appropriately and in agreement with d/c plan.  Pt will d/c to pt's home under the care of pt's s/o (Jayden Winston) with pt's s/o's father - Sean Winston ([phone]) providing transportation.  No needs identified at this time.  Pt denies having any concerns at this time.  Patient verbalizes understanding and agreement with information reviewed, social work availability, and how to access available resources as needed.  SW remains available.

## 2017-01-16 NOTE — PROGRESS NOTES
Discharge Medication Note:    Hospital Course:  65 y/o M s/p Kidney Transplant on 11/26/16 secondary to HTN.  Admitted due to acute vascular rejection seen on biopsy to start Thymoglobulin treatment.  DSA on 1/6 were negative.  Patient tolerated 4 (#1-5) total doses of thymo -- dose number 3 and 4 were half doses due to neutropenia requiring one dose of neupogen, then #5 was a full dose.  CD3 count still pending.  Patient had fever prior to dose #2 of thymo therefore Blood and Urine cultures were obtained.  Urine culture grew VRE therefore patient is on zyvox 600 mg PO BID x 10 days total ending on 1/23/17. Plan for another biopsy next Monday.  Increased MMF dose to 750 mg PO BID and running FK levels slightly higher.  Patient's ANC today was 3,400.    Met with Alin Burkett at discharge to review discharge medications and to update the blue medication card.      Medication Information      famotidine (PEPCID) 20 MG tablet  Take 1 tablet (20 mg total) by mouth every evening.     k phos di & mono-sod phos mono (K-PHOS-NEUTRAL) 250 mg Tab  Take 3 tablets by mouth 3 (three) times daily.     ketoconazole (NIZORAL) 200 mg Tab  Take 0.5 tablets (100 mg total) by mouth once daily.     levothyroxine (SYNTHROID) 112 MCG tablet  Take 1 tablet (112 mcg total) by mouth once daily.     linezolid (ZYVOX) 600 mg Tab  Take 1 tablet (600 mg total) by mouth every 12 (twelve) hours.     magnesium oxide (MAG-OX) 400 mg tablet  Take 1 tablet (400 mg total) by mouth once daily.     metoprolol tartrate (LOPRESSOR) 25 MG tablet  Take 0.5 tablets (12.5 mg total) by mouth 2 (two) times daily.     multivitamin (ONE DAILY MULTIVITAMIN) per tablet  Take 1 tablet by mouth once daily.     mycophenolate (CELLCEPT) 250 mg Cap  Take 3 capsules (750 mg total) by mouth 2 (two) times daily. Z94.0/Kidney Transplant on 11/26/16     nystatin (MYCOSTATIN) 100,000 unit/mL suspension  Take 5 mLs (500,000 Units total) by mouth 4 (four) times daily with  meals and nightly. Stop: 2/13/17     oxycodone-acetaminophen (PERCOCET) 5-325 mg per tablet  Take 1 tablet by mouth every 6 (six) hours as needed.     predniSONE (DELTASONE) 10 MG tablet  Take 20 mg PO QD 1/13-2/12; 15 mg PO QD 2/13-3/12; 10 mg PO QD 3/13-4/12; then 5 mg PO QD thereafter     sodium bicarbonate 650 MG tablet  Take 3 tablets (1,950 mg total) by mouth 2 (two) times daily.     sulfamethoxazole-trimethoprim 400-80mg (BACTRIM,SEPTRA) 400-80 mg per tablet  Take 1 tablet by mouth once daily. Stop: 11/26/17     tacrolimus (PROGRAF) 1 MG Cap  Take 7 capsules (7 mg total) by mouth every 12 (twelve) hours. Z94.0/Kidney Transplant on 11/26/16     valganciclovir (VALCYTE) 450 mg Tab  Take 1 tablet (450 mg total) by mouth once daily. Stop: 2/24/17         Pt was provided with prescriptions for the following meds:  E-rx: Ketoconazole, MMF, updated FK dose, Nystatin, Prednisone taper, KPN, Zyvox  Printed rx: n/a  Phone-in or faxed rx: n/a to n/a pharmacy per pt request.    The following medications have been placed on HOLD and should be restarted in the outpatient setting (when appropriate): Nifedipine on hold due to low BPs -- may be discontinued altogether     Alin Burkett verbalized understanding and had the opportunity to ask questions.

## 2017-01-16 NOTE — PLAN OF CARE
Problem: Patient Care Overview  Goal: Plan of Care Review  Outcome: Ongoing (interventions implemented as appropriate)  Pt has call bell in reach, non slip socks on, and wife at bedside. Pt encouraged to wash hands. Pt on po antibiotics. Pt has labs and strict I&Os.

## 2017-01-16 NOTE — DISCHARGE SUMMARY
Discharge Summary      Admit Date: 2017    Discharge Date and Time: 2017    Attending Physician: Andrew Lopez Jr., MD     Discharge Physician: Andrew Lopez Jr., MD     Principal Diagnoses: Acute rejection of kidney transplant    Other Secondary Diagnoses:   1. Acute vascular rejection of kidney transplant 17    2. Acute rejection of kidney transplant    3. Long-term use of immunosuppressant medication    4. Prophylactic immunotherapy    5. S/P kidney transplant    6. Metabolic acidosis    7. Hypophosphatemia    8. Calcium nephrolithiasis    9. -donor kidney transplant 16        Discharged Condition: fair    Indication for Admission: Acute rejection of kidney transplant [T86.11]     Hospital Course:  Mr. Burkett is a 66 y.o. s/p  donor kidney transplant on 16 for HTN due to decreased renal mass (congenitally absent left kidney) and HTN who was on dialysis since 6/16/10 until receiving PHS increased risk DDRT (pumped kidney, Campath induction, KDPI 36%, WIT 30 minutes, CIT 14 hours and 14 minutes, donor RPR positive thus patient completed PCN x3, CMV D+/R+) on 16    His maintenance immunosuppression consists of mycophenolate mofetil and tacrolimus.   Immune Prophylaxis w treatment of rejection Bactrim for PJP prophylaxis for 1 year, continue Valcyte for CMV prophylaxis for 3 months; CMV D+/R+ and Nystatin for thrush prophylaxis for 1 month.   Pt recently admitted for DIVINE with suspected rejection.  Labs on admit  showed improvement of Cr from 2.6 to 1.6. Pt did receive 1 dose of SMP after labs drawn. Kid bx  revealed AVR nd he was d/c'd home while awaiting results.  He was readmitted  to begin tx with thymo x 5-7 doses. DSA  neg.  He tolerated Thymo dose #1 w/o comp.  Dose #2 held due to fever 100.5.  No further fever.  Blood cx w NGTD.  U cx resulted (+) enterococcus >100,000.  He will be treated w Zyvox fro total of 10 days.  He received Thymo dose  #2 on 1/12, Thymo HALF dose #3, Thymo HALF dose #4 on 1/14( also received Neupogen 480 mcg SC 1/14/17) and Thymo full dose #5 on 1/15.  Cr improved to 1.2. CD3 this am is absolute 6.  No plan for additional plan for Thymo. CMV 1/10 not detected. G6PD normal acitivity.  Plan for discharge to home today.  Plan for kidney bx next week.  Labs on Wednesday and Friday.  Hypertension well managed w continuing Metoprolol. Acidosis improved w bicarb.  He will cont Mag Ox for hypomagnesemia.  Will cont synthroid and sodium bicarb 1950 mg bid to maintain bicarb at 23.    Will cont K-phos 750 mg TID and encourage patient regarding higher phos diet     Discharge instructions reviewed w patient per Pharmacist an Nursing.  Patient denies questions.  He will have labs Wednesday an Friday and f/u kidney bx on Monday.  Patient is appropriate for discharge to home today.        Consults: None    Significant Diagnostic Studies: labs:   Lab Results   Component Value Date    WBC 3.61 (L) 01/16/2017    HGB 10.4 (L) 01/16/2017    HCT 31.0 (L) 01/16/2017    MCV 91 01/16/2017    PLT 82 (L) 01/16/2017     CMP  Sodium   Date Value Ref Range Status   01/16/2017 140 136 - 145 mmol/L Final     Potassium   Date Value Ref Range Status   01/16/2017 3.6 3.5 - 5.1 mmol/L Final     Chloride   Date Value Ref Range Status   01/16/2017 112 (H) 95 - 110 mmol/L Final     CO2   Date Value Ref Range Status   01/16/2017 19 (L) 23 - 29 mmol/L Final     Glucose   Date Value Ref Range Status   01/16/2017 103 70 - 110 mg/dL Final     BUN, Bld   Date Value Ref Range Status   01/16/2017 20 8 - 23 mg/dL Final     Creatinine   Date Value Ref Range Status   01/16/2017 1.1 0.5 - 1.4 mg/dL Final     Calcium   Date Value Ref Range Status   01/16/2017 8.4 (L) 8.7 - 10.5 mg/dL Final     Total Protein   Date Value Ref Range Status   01/16/2017 5.9 (L) 6.0 - 8.4 g/dL Final     Albumin   Date Value Ref Range Status   01/16/2017 2.9 (L) 3.5 - 5.2 g/dL Final     Total Bilirubin    Date Value Ref Range Status   01/16/2017 0.3 0.1 - 1.0 mg/dL Final     Comment:     For infants and newborns, interpretation of results should be based  on gestational age, weight and in agreement with clinical  observations.  Premature Infant recommended reference ranges:  Up to 24 hours.............<8.0 mg/dL  Up to 48 hours............<12.0 mg/dL  3-5 days..................<15.0 mg/dL  6-29 days.................<15.0 mg/dL       Alkaline Phosphatase   Date Value Ref Range Status   01/16/2017 91 55 - 135 U/L Final     AST   Date Value Ref Range Status   01/16/2017 14 10 - 40 U/L Final     ALT   Date Value Ref Range Status   01/16/2017 61 (H) 10 - 44 U/L Final     Anion Gap   Date Value Ref Range Status   01/16/2017 9 8 - 16 mmol/L Final     eGFR if    Date Value Ref Range Status   01/16/2017 >60.0 >60 mL/min/1.73 m^2 Final     eGFR if non    Date Value Ref Range Status   01/16/2017 >60.0 >60 mL/min/1.73 m^2 Final     Comment:     Calculation used to obtain the estimated glomerular filtration  rate (eGFR) is the CKD-EPI equation. Since race is unknown   in our information system, the eGFR values for   -American and Non--American patients are given   for each creatinine result.       Tacrolimus Lvl   Date Value Ref Range Status   01/16/2017 14.0 5.0 - 15.0 ng/mL Final     Comment:     Testing performed by Liquid Chromatography-Tandem  Mass Spectrometry (LC-MS/MS).  This test was developed and its performance characteristics  determined by [facility], Department of Pathology  and Laboratory Medicine in a manner consistent with CLIA  requirements. It has not been cleared or approved by the [place]  Food and Drug Administration.  This test is used for clinical   purposes.  It should not be regarded as investigational or for  research.     01/15/2017 14.2 5.0 - 15.0 ng/mL Final     Comment:     Testing performed by Liquid Chromatography-Tandem  Mass Spectrometry  (LC-MS/MS).  This test was developed and its performance characteristics  determined by [facility], Department of Pathology  and Laboratory Medicine in a manner consistent with CLIA  requirements. It has not been cleared or approved by the [place]  Food and Drug Administration.  This test is used for clinical   purposes.  It should not be regarded as investigational or for  research.     01/14/2017 9.7 5.0 - 15.0 ng/mL Final     Comment:     Testing performed by Liquid Chromatography-Tandem  Mass Spectrometry (LC-MS/MS).  This test was developed and its performance characteristics  determined by [facility], Department of Pathology  and Laboratory Medicine in a manner consistent with CLIA  requirements. It has not been cleared or approved by the [place]  Food and Drug Administration.  This test is used for clinical   purposes.  It should not be regarded as investigational or for  research.     01/13/2017 5.4 5.0 - 15.0 ng/mL Final     Comment:     Testing performed by Liquid Chromatography-Tandem  Mass Spectrometry (LC-MS/MS).  This test was developed and its performance characteristics  determined by [facility], Department of Pathology  and Laboratory Medicine in a manner consistent with CLIA  requirements. It has not been cleared or approved by the [place]  Food and Drug Administration.  This test is used for clinical   purposes.  It should not be regarded as investigational or for  research.         Treatments: see HPI    Disposition: Home or Self Care    Patient Instructions:   Current Discharge Medication List      START taking these medications    Details   ketoconazole (NIZORAL) 200 mg Tab Take 0.5 tablets (100 mg total) by mouth once daily.  Qty: 15 tablet, Refills: 5      linezolid (ZYVOX) 600 mg Tab Take 1 tablet (600 mg total) by mouth every 12 (twelve) hours.  Qty: 14 tablet, Refills: 0      nystatin (MYCOSTATIN) 100,000 unit/mL suspension Take 5 mLs (500,000 Units total) by mouth 4  (four) times daily with meals and nightly. Stop: 2/13/17  Qty: 473 mL, Refills: 0      predniSONE (DELTASONE) 10 MG tablet Take 20 mg PO QD 1/13-2/12; 15 mg PO QD 2/13-3/12; 10 mg PO QD 3/13-4/12; then 5 mg PO QD thereafter  Qty: 60 tablet, Refills: 11         CONTINUE these medications which have CHANGED    Details   k phos di & mono-sod phos mono (K-PHOS-NEUTRAL) 250 mg Tab Take 3 tablets by mouth 3 (three) times daily.  Qty: 270 tablet, Refills: 11    Comments: DOSE INCREASE      metoprolol tartrate (LOPRESSOR) 25 MG tablet Take 0.5 tablets (12.5 mg total) by mouth 2 (two) times daily.  Qty: 30 tablet, Refills: 11      mycophenolate (CELLCEPT) 250 mg Cap Take 3 capsules (750 mg total) by mouth 2 (two) times daily. Z94.0/Kidney Transplant on 11/26/16  Qty: 180 capsule, Refills: 11    Comments: DOSE DECREASE; Kidney Transplant z94.0; 11/26/16      tacrolimus (PROGRAF) 1 MG Cap Take 7 capsules (7 mg total) by mouth every 12 (twelve) hours. Z94.0/Kidney Transplant on 11/26/16  Qty: 450 capsule, Refills: 11    Comments: DOSE INCREASE; Z94.0/Kidney Transplant on 11/26/16         CONTINUE these medications which have NOT CHANGED    Details   famotidine (PEPCID) 20 MG tablet Take 1 tablet (20 mg total) by mouth every evening.  Qty: 30 tablet, Refills: 11      levothyroxine (SYNTHROID) 112 MCG tablet Take 1 tablet (112 mcg total) by mouth once daily.  Qty: 30 tablet, Refills: 6      magnesium oxide (MAG-OX) 400 mg tablet Take 1 tablet (400 mg total) by mouth once daily.  Qty: 30 tablet, Refills: 11      multivitamin (ONE DAILY MULTIVITAMIN) per tablet Take 1 tablet by mouth once daily.      oxycodone-acetaminophen (PERCOCET) 5-325 mg per tablet Take 1 tablet by mouth every 6 (six) hours as needed.  Qty: 40 tablet, Refills: 0      sodium bicarbonate 650 MG tablet Take 3 tablets (1,950 mg total) by mouth 2 (two) times daily.  Qty: 180 tablet, Refills: 11    Comments: DOSE INCREASE      sulfamethoxazole-trimethoprim 400-80mg  (BACTRIM,SEPTRA) 400-80 mg per tablet Take 1 tablet by mouth once daily. Stop: 11/26/17  Qty: 30 tablet, Refills: 11    Comments: DOSE INCREASE; Kidney Transplant z94.0; 11/26/16      valganciclovir (VALCYTE) 450 mg Tab Take 1 tablet (450 mg total) by mouth once daily. Stop: 2/24/17  Qty: 30 tablet, Refills: 3    Comments: DOSE INCREASE; Kidney Transplant z94.0; 11/26/16               Discharge Procedure Orders  Diet general     Activity as tolerated     No dressing needed     Call MD for:  temperature >100.4     Call MD for:  increased confusion or weakness     Call MD for:  persistent dizziness, light-headedness, or visual disturbances     Call MD for:  worsening rash     Call MD for:  severe persistent headache     Call MD for:  difficulty breathing or increased cough     Call MD for:  redness, tenderness, or signs of infection (pain, swelling, redness, odor or green/yellow discharge around incision site)     Call MD for:  severe uncontrolled pain     Call MD for:  persistent nausea and vomiting or diarrhea         Time spent caring for patient (Greater than 1/2 spent in direct face-to-face contact): > 30 minutes

## 2017-01-16 NOTE — PLAN OF CARE
Problem: Patient Care Overview  Goal: Plan of Care Review  Outcome: Ongoing (interventions implemented as appropriate)  Patient's VSS and in NAD for shift. Patient remains calm, cooperative, independent, and AAOx4. Patient up ad [place]olina in room with no overt deficits. Patient received 5th dose of Thymo today with no reported adverse effects. Patient's urine output 725 ml for shift. Patient remains free from injury, falls, and skin breakdown.

## 2017-01-16 NOTE — PROGRESS NOTES
Pt transferred to Rm [id number] by bed with BRUNO Lucio assistance. Pt has all belongings. I have given report to BRUNO Darby.

## 2017-01-16 NOTE — PROGRESS NOTES
Staff Transplant Nephrology addendum:  Patient was seen and examined with Janna August     I agree with assessment and plan. I spoke with the patient for 15 minutes and explained  current plan. Patient and family voiced understanding. All questions answered  S/p kidney biopsy due to DIVINE  Vascurar rejection s/p 4 doses of thymo  VRE UTI: will get po Linezolid  No evidence of AMR    Will d/c patient today  Will repeat kidney biopsy next week  Will investigate with KIRA for central line if more thymo is needed  CD3 count from today is pending  KPN oral replacement and oral sodium bicarbonate  Same dose of prograf  MMF: 750 bid  Labs Wednesday and Friday

## 2017-01-16 NOTE — PLAN OF CARE
Notified by the [facility] the 1/11/2017 urine culture is positive for VRE, follow CONTACT ISOLATION and utilize appropriate PPE for patient encounters.  Sanitize hands after PPE removal.  Call the Infection Prevention dept. ([id number]) for isolation discontinuation criteria.

## 2017-01-16 NOTE — PROGRESS NOTES
Discharge education given to pt  regarding medications, future appointments, and when to call a healthcare provider. He verbalized understanding. Peripheral IV removed. He is ambulating downstairs independently and wife will drive him home.

## 2017-01-17 DIAGNOSIS — T86.10 COMPLICATIONS OF TRANSPLANTED KIDNEY: Primary | ICD-10-CM

## 2017-01-18 ENCOUNTER — LAB VISIT (OUTPATIENT)
Dept: LAB | Facility: HOSPITAL | Age: 67
End: 2017-01-18
Attending: INTERNAL MEDICINE
Payer: MEDICARE

## 2017-01-18 DIAGNOSIS — Z94.0 STATUS POST KIDNEY TRANSPLANT: ICD-10-CM

## 2017-01-18 DIAGNOSIS — Z79.899 ENCOUNTER FOR LONG-TERM (CURRENT) USE OF OTHER MEDICATIONS: ICD-10-CM

## 2017-01-18 LAB
ALBUMIN SERPL BCP-MCNC: 3.3 G/DL
ANION GAP SERPL CALC-SCNC: 8 MMOL/L
ANISOCYTOSIS BLD QL SMEAR: SLIGHT
BACTERIA UR CULT: NO GROWTH
BASOPHILS # BLD AUTO: ABNORMAL K/UL
BASOPHILS NFR BLD: 0 %
BUN SERPL-MCNC: 23 MG/DL
CALCIUM SERPL-MCNC: 8.6 MG/DL
CHLORIDE SERPL-SCNC: 107 MMOL/L
CO2 SERPL-SCNC: 24 MMOL/L
CREAT SERPL-MCNC: 1.3 MG/DL
DIFFERENTIAL METHOD: ABNORMAL
EOSINOPHIL # BLD AUTO: ABNORMAL K/UL
EOSINOPHIL NFR BLD: 0 %
ERYTHROCYTE [DISTWIDTH] IN BLOOD BY AUTOMATED COUNT: 16.3 %
EST. GFR  (AFRICAN AMERICAN): >60 ML/MIN/1.73 M^2
EST. GFR  (NON AFRICAN AMERICAN): 57.3 ML/MIN/1.73 M^2
GLUCOSE SERPL-MCNC: 83 MG/DL
HCT VFR BLD AUTO: 34.1 %
HGB BLD-MCNC: 11.5 G/DL
HYPOCHROMIA BLD QL SMEAR: ABNORMAL
LYMPHOCYTES # BLD AUTO: ABNORMAL K/UL
LYMPHOCYTES NFR BLD: 0 %
MAGNESIUM SERPL-MCNC: 1.6 MG/DL
MCH RBC QN AUTO: 30.6 PG
MCHC RBC AUTO-ENTMCNC: 33.7 %
MCV RBC AUTO: 91 FL
MONOCYTES # BLD AUTO: ABNORMAL K/UL
MONOCYTES NFR BLD: 4 %
NEUTROPHILS NFR BLD: 94 %
NEUTS BAND NFR BLD MANUAL: 2 %
OVALOCYTES BLD QL SMEAR: ABNORMAL
PHOSPHATE SERPL-MCNC: 2 MG/DL
PLATELET # BLD AUTO: 115 K/UL
PLATELET BLD QL SMEAR: ABNORMAL
PMV BLD AUTO: 10.8 FL
POIKILOCYTOSIS BLD QL SMEAR: SLIGHT
POTASSIUM SERPL-SCNC: 3.3 MMOL/L
RBC # BLD AUTO: 3.76 M/UL
SODIUM SERPL-SCNC: 139 MMOL/L
WBC # BLD AUTO: 5.71 K/UL

## 2017-01-18 PROCEDURE — 85007 BL SMEAR W/DIFF WBC COUNT: CPT

## 2017-01-18 PROCEDURE — 85027 COMPLETE CBC AUTOMATED: CPT

## 2017-01-18 PROCEDURE — 80069 RENAL FUNCTION PANEL: CPT

## 2017-01-18 PROCEDURE — 36415 COLL VENOUS BLD VENIPUNCTURE: CPT | Mod: PO

## 2017-01-18 PROCEDURE — 80197 ASSAY OF TACROLIMUS: CPT

## 2017-01-18 PROCEDURE — 83735 ASSAY OF MAGNESIUM: CPT

## 2017-01-19 ENCOUNTER — TELEPHONE (OUTPATIENT)
Dept: HEPATOLOGY | Facility: CLINIC | Age: 67
End: 2017-01-19

## 2017-01-19 DIAGNOSIS — N17.9 ACUTE KIDNEY FAILURE, UNSPECIFIED: ICD-10-CM

## 2017-01-19 DIAGNOSIS — T86.10 COMPLICATIONS OF TRANSPLANTED KIDNEY: Primary | ICD-10-CM

## 2017-01-19 LAB — TACROLIMUS BLD-MCNC: 14.2 NG/ML

## 2017-01-19 NOTE — TELEPHONE ENCOUNTER
MA called patient to schedule his Initial visit. Patient accepted 2/2/17 to see Dr. Epstein. Mailed appt reminder to patient.HO

## 2017-01-20 ENCOUNTER — LAB VISIT (OUTPATIENT)
Dept: LAB | Facility: HOSPITAL | Age: 67
End: 2017-01-20
Attending: INTERNAL MEDICINE
Payer: MEDICARE

## 2017-01-20 DIAGNOSIS — Z94.0 KIDNEY REPLACED BY TRANSPLANT: ICD-10-CM

## 2017-01-20 LAB
ALBUMIN SERPL BCP-MCNC: 3.2 G/DL
ANION GAP SERPL CALC-SCNC: 8 MMOL/L
ANISOCYTOSIS BLD QL SMEAR: SLIGHT
BASOPHILS # BLD AUTO: 0 K/UL
BASOPHILS NFR BLD: 0 %
BUN SERPL-MCNC: 22 MG/DL
BURR CELLS BLD QL SMEAR: ABNORMAL
CALCIUM SERPL-MCNC: 8.6 MG/DL
CHLORIDE SERPL-SCNC: 109 MMOL/L
CO2 SERPL-SCNC: 23 MMOL/L
CREAT SERPL-MCNC: 1.3 MG/DL
DACRYOCYTES BLD QL SMEAR: ABNORMAL
DIFFERENTIAL METHOD: ABNORMAL
EOSINOPHIL # BLD AUTO: 0 K/UL
EOSINOPHIL NFR BLD: 0.6 %
ERYTHROCYTE [DISTWIDTH] IN BLOOD BY AUTOMATED COUNT: 15.9 %
EST. GFR  (AFRICAN AMERICAN): >60 ML/MIN/1.73 M^2
EST. GFR  (NON AFRICAN AMERICAN): 57.3 ML/MIN/1.73 M^2
GLUCOSE SERPL-MCNC: 81 MG/DL
HCT VFR BLD AUTO: 33.3 %
HGB BLD-MCNC: 11.1 G/DL
LYMPHOCYTES # BLD AUTO: 0 K/UL
LYMPHOCYTES NFR BLD: 0.3 %
MAGNESIUM SERPL-MCNC: 1.7 MG/DL
MCH RBC QN AUTO: 30.8 PG
MCHC RBC AUTO-ENTMCNC: 33.3 %
MCV RBC AUTO: 93 FL
MONOCYTES # BLD AUTO: 0.6 K/UL
MONOCYTES NFR BLD: 8.9 %
NEUTROPHILS # BLD AUTO: 6.1 K/UL
NEUTROPHILS NFR BLD: 90.2 %
OVALOCYTES BLD QL SMEAR: ABNORMAL
PHOSPHATE SERPL-MCNC: 2.1 MG/DL
PLATELET # BLD AUTO: 122 K/UL
PLATELET BLD QL SMEAR: ABNORMAL
PMV BLD AUTO: 10.8 FL
POIKILOCYTOSIS BLD QL SMEAR: SLIGHT
POTASSIUM SERPL-SCNC: 3.8 MMOL/L
RBC # BLD AUTO: 3.6 M/UL
SCHISTOCYTES BLD QL SMEAR: ABNORMAL
SODIUM SERPL-SCNC: 140 MMOL/L
WBC # BLD AUTO: 6.71 K/UL

## 2017-01-20 PROCEDURE — 85025 COMPLETE CBC W/AUTO DIFF WBC: CPT | Mod: PO

## 2017-01-20 PROCEDURE — 80069 RENAL FUNCTION PANEL: CPT

## 2017-01-20 PROCEDURE — 80197 ASSAY OF TACROLIMUS: CPT

## 2017-01-20 PROCEDURE — 83735 ASSAY OF MAGNESIUM: CPT

## 2017-01-20 PROCEDURE — 36415 COLL VENOUS BLD VENIPUNCTURE: CPT | Mod: PO

## 2017-01-20 RX ORDER — TACROLIMUS 1 MG/1
6 CAPSULE ORAL EVERY 12 HOURS
Qty: 360 CAPSULE | Refills: 11 | Status: SHIPPED | OUTPATIENT
Start: 2017-01-20 | End: 2017-01-27 | Stop reason: SDUPTHER

## 2017-01-20 NOTE — PROGRESS NOTES
Phos low at 2.1 -->  on higher phos diet; if he is taking K-phos 750 mg TID then increase to 1000 mg TID

## 2017-01-20 NOTE — TELEPHONE ENCOUNTER
----- Message from Deb Gardner MD sent at 1/20/2017  4:12 PM CST -----  Phos low at 2.1 -->  on higher phos diet; if he is taking K-phos 750 mg TID then increase to 1000 mg TID

## 2017-01-20 NOTE — TELEPHONE ENCOUNTER
----- Message from Travis Zimmerman MD sent at 1/19/2017  1:11 PM CST -----  Please lower prograf to 6 mg po bid

## 2017-01-20 NOTE — TELEPHONE ENCOUNTER
Notified patient of Dr. Gardner's review of 1/18/17 lab results. Instructed patient to decrease Prograf to 6mg twice daily; increase KPN to 4 tabs three x daily; next lab appointment is 1/23/17. Patient verbalized understanding.

## 2017-01-21 LAB — TACROLIMUS BLD-MCNC: 11.7 NG/ML

## 2017-01-22 ENCOUNTER — NURSE TRIAGE (OUTPATIENT)
Dept: ADMINISTRATIVE | Facility: CLINIC | Age: 67
End: 2017-01-22

## 2017-01-22 NOTE — TELEPHONE ENCOUNTER
"BPA 16  Pt states that he did not call. Spouse called re cold sx    Reason for Disposition   Patient sounds very sick or weak to the triager    Answer Assessment - Initial Assessment Questions  1. ONSET: "When did the nasal discharge start?"       Cold sx x 3-4 days, just got out of hospital Monday for kidney bx. Kidney tx 2016. On valcyte, on abx, -zyvox         3. COUGH: "Do you have a cough?" If yes, ask: "Describe the color of your sputum" (clear, white, yellow, green)      Some cough, gets cough freq   4. RESPIRATORY DISTRESS: "Describe your breathing."       No SOB   5. FEVER: "Do you have a fever?" If so, ask: "What is your temperature, how was it measured, and when did it start?"      afeb - unsure if temp checked  6. SEVERITY: "Overall, how bad are you feeling right now?" (e.g., doesn't interfere with normal activities, staying home from school/work, staying in bed)       Pt has bx radha and pt doesn't know that pt can have bx with cold sx   7. OTHER SYMPTOMS: "Do you have any other symptoms?" (e.g., sore throat, earache, wheezing, vomiting)      C/o ST- getting better    Protocols used: ST [facility] pt follow up with kidney tx 11/28/2016  spok with neph resident, will review record and call family if pt cannot have bx tomorrow due to cold sx. Family notified. Offered appt for follow up with cold sx. appt made at 2pm. Sent message for OV earlier as first available at 2pm. call back with questions.      "

## 2017-01-23 ENCOUNTER — OFFICE VISIT (OUTPATIENT)
Dept: NEPHROLOGY | Facility: CLINIC | Age: 67
End: 2017-01-23
Payer: MEDICARE

## 2017-01-23 ENCOUNTER — HOSPITAL ENCOUNTER (OUTPATIENT)
Facility: HOSPITAL | Age: 67
Discharge: HOME | End: 2017-01-23
Attending: INTERNAL MEDICINE | Admitting: INTERNAL MEDICINE
Payer: MEDICARE

## 2017-01-23 ENCOUNTER — SURGERY (OUTPATIENT)
Age: 67
End: 2017-01-23

## 2017-01-23 ENCOUNTER — TELEPHONE (OUTPATIENT)
Dept: NEPHROLOGY | Facility: CLINIC | Age: 67
End: 2017-01-23

## 2017-01-23 VITALS
OXYGEN SATURATION: 93 % | BODY MASS INDEX: 18.58 KG/M2 | HEART RATE: 55 BPM | HEIGHT: 71 IN | WEIGHT: 132.69 LBS | DIASTOLIC BLOOD PRESSURE: 82 MMHG | SYSTOLIC BLOOD PRESSURE: 123 MMHG

## 2017-01-23 VITALS
DIASTOLIC BLOOD PRESSURE: 67 MMHG | BODY MASS INDEX: 18.48 KG/M2 | HEIGHT: 71 IN | TEMPERATURE: 98 F | RESPIRATION RATE: 18 BRPM | HEART RATE: 76 BPM | OXYGEN SATURATION: 100 % | WEIGHT: 132 LBS | SYSTOLIC BLOOD PRESSURE: 115 MMHG

## 2017-01-23 DIAGNOSIS — Z94.0 DECEASED-DONOR KIDNEY TRANSPLANT: ICD-10-CM

## 2017-01-23 DIAGNOSIS — Z29.89 PROPHYLACTIC IMMUNOTHERAPY: ICD-10-CM

## 2017-01-23 DIAGNOSIS — Q63.2 ECTOPIC KIDNEY: ICD-10-CM

## 2017-01-23 DIAGNOSIS — T86.10 COMPLICATIONS OF TRANSPLANTED KIDNEY: ICD-10-CM

## 2017-01-23 DIAGNOSIS — Z87.891 HISTORY OF SMOKING 10-25 PACK YEARS: ICD-10-CM

## 2017-01-23 DIAGNOSIS — E87.20 METABOLIC ACIDOSIS: ICD-10-CM

## 2017-01-23 DIAGNOSIS — N25.81 SECONDARY HYPERPARATHYROIDISM, RENAL: ICD-10-CM

## 2017-01-23 DIAGNOSIS — Z98.890 HISTORY OF AAA (ABDOMINAL AORTIC ANEURYSM) REPAIR: ICD-10-CM

## 2017-01-23 DIAGNOSIS — E83.39 HYPOPHOSPHATEMIA: ICD-10-CM

## 2017-01-23 DIAGNOSIS — Q60.2 CONGENITAL ABSENCE OF KIDNEY: ICD-10-CM

## 2017-01-23 DIAGNOSIS — N40.0 BENIGN PROSTATIC HYPERPLASIA, PRESENCE OF LOWER URINARY TRACT SYMPTOMS UNSPECIFIED, UNSPECIFIED MORPHOLOGY: Chronic | ICD-10-CM

## 2017-01-23 DIAGNOSIS — G47.33 OSA (OBSTRUCTIVE SLEEP APNEA): Primary | ICD-10-CM

## 2017-01-23 DIAGNOSIS — K52.9 CHRONIC DIARRHEA: ICD-10-CM

## 2017-01-23 DIAGNOSIS — Z79.60 LONG-TERM USE OF IMMUNOSUPPRESSANT MEDICATION: ICD-10-CM

## 2017-01-23 DIAGNOSIS — Z94.0 S/P KIDNEY TRANSPLANT: ICD-10-CM

## 2017-01-23 DIAGNOSIS — D35.00 ADRENAL ADENOMA, UNSPECIFIED LATERALITY: ICD-10-CM

## 2017-01-23 DIAGNOSIS — T86.10 COMPLICATION OF KIDNEY TRANSPLANT: ICD-10-CM

## 2017-01-23 DIAGNOSIS — N20.0 CALCIUM NEPHROLITHIASIS: ICD-10-CM

## 2017-01-23 DIAGNOSIS — D63.8 ANEMIA OF CHRONIC DISEASE: ICD-10-CM

## 2017-01-23 DIAGNOSIS — I11.9 LVH (LEFT VENTRICULAR HYPERTROPHY) DUE TO HYPERTENSIVE DISEASE, WITHOUT HEART FAILURE: ICD-10-CM

## 2017-01-23 DIAGNOSIS — N17.9 AKI (ACUTE KIDNEY INJURY): ICD-10-CM

## 2017-01-23 DIAGNOSIS — T86.11 ACUTE REJECTION OF KIDNEY TRANSPLANT: ICD-10-CM

## 2017-01-23 DIAGNOSIS — I10 ESSENTIAL HYPERTENSION: ICD-10-CM

## 2017-01-23 PROCEDURE — 99213 OFFICE O/P EST LOW 20 MIN: CPT | Mod: PBBFAC | Performed by: INTERNAL MEDICINE

## 2017-01-23 PROCEDURE — 99999 PR PBB SHADOW E&M-EST. PATIENT-LVL III: CPT | Mod: PBBFAC,,, | Performed by: INTERNAL MEDICINE

## 2017-01-23 PROCEDURE — 99024 POSTOP FOLLOW-UP VISIT: CPT | Mod: ,,, | Performed by: INTERNAL MEDICINE

## 2017-01-23 RX ORDER — HYDROCODONE BITARTRATE AND ACETAMINOPHEN 5; 325 MG/1; MG/1
1 TABLET ORAL EVERY 4 HOURS PRN
Status: CANCELLED | OUTPATIENT
Start: 2017-01-23

## 2017-01-23 RX ADMIN — DESMOPRESSIN ACETATE 9.12 MCG: 4 INJECTION INTRAVENOUS at 09:01

## 2017-01-23 NOTE — PROGRESS NOTES
Subjective:       Patient ID: Alin Burkett is a 67 y.o. Black or  male who presents erroneously for evaluation. Discussed with Dr. Gardner: Patient had a renal bx today by transplant nephrologist and will be followed by them until further notice.    HPI           Review of Systems   Constitutional: Negative for activity change, appetite change, chills, diaphoresis, fatigue, fever and unexpected weight change.   HENT: Negative for congestion, ear discharge, ear pain, facial swelling, hearing loss, nosebleeds, sinus pressure, sore throat and trouble swallowing.    Eyes: Negative for photophobia, pain, discharge, redness, itching and visual disturbance.   Respiratory: Negative for apnea, cough, chest tightness, shortness of breath and wheezing.    Cardiovascular: Negative for chest pain, palpitations and leg swelling.   Gastrointestinal: Negative for abdominal distention, abdominal pain, constipation, diarrhea and vomiting.   Endocrine: Negative for cold intolerance, heat intolerance, polydipsia and polyuria.   Genitourinary: Negative for decreased urine volume, difficulty urinating, dysuria, flank pain, frequency, hematuria, scrotal swelling, testicular pain and urgency.   Musculoskeletal: Negative for arthralgias, back pain, gait problem, joint swelling, myalgias, neck pain and neck stiffness.   Skin: Negative for color change, pallor, rash and wound.   Allergic/Immunologic: Negative for environmental allergies and food allergies.   Neurological: Negative for dizziness, tremors, seizures, syncope, facial asymmetry, speech difficulty, weakness, light-headedness, numbness and headaches.   Hematological: Negative for adenopathy. Does not bruise/bleed easily.   Psychiatric/Behavioral: Negative for agitation, behavioral problems, dysphoric mood and sleep disturbance. The patient is not nervous/anxious.        Objective:       Physical Exam   Constitutional: He is oriented to person, place, and time. He  appears well-developed and well-nourished. No distress.   HENT:   Head: Normocephalic and atraumatic.   Mouth/Throat: Oropharynx is clear and moist. No oropharyngeal exudate.   Eyes: Conjunctivae and EOM are normal. Pupils are equal, round, and reactive to light. Right eye exhibits no discharge. Left eye exhibits no discharge. No scleral icterus.   Neck: Normal range of motion. Neck supple. No JVD present. No tracheal deviation present. No thyromegaly present.   Cardiovascular: Normal rate, regular rhythm and normal heart sounds.  Exam reveals no gallop and no friction rub.    No murmur heard.  Pulmonary/Chest: No stridor. No respiratory distress. He has no wheezes. He has no rales. He exhibits no tenderness.   Abdominal: Soft. Bowel sounds are normal. He exhibits no distension and no mass. There is no tenderness. There is no rebound and no guarding. No hernia.   Musculoskeletal: Normal range of motion. He exhibits no edema or tenderness.   Lymphadenopathy:     He has no cervical adenopathy.   Neurological: He is alert and oriented to person, place, and time. No cranial nerve deficit. He exhibits normal muscle tone. Coordination normal.   Skin: Skin is warm and dry. No rash noted. He is not diaphoretic. No erythema. No pallor.   Psychiatric: He has a normal mood and affect. His behavior is normal. Judgment and thought content normal.   Nursing note and vitals reviewed.      Assessment:       1. LOUISA (obstructive sleep apnea), CPAP refused    2. Essential hypertension    3. LVH (left ventricular hypertrophy) due to hypertensive disease, without heart failure    4. Acute vascular rejection of kidney transplant 1/6/17    5. DIVINE (acute kidney injury)    6. Benign prostatic hyperplasia, presence of lower urinary tract symptoms unspecified, unspecified morphology    7. Calcium nephrolithiasis    8. Congenital absence of kidney    9. Ectopic kidney    10. Metabolic acidosis    11. Adrenal adenoma, unspecified laterality     12. Anemia of chronic disease    13. Secondary hyperparathyroidism, renal    14. Chronic diarrhea    15. Hypophosphatemia    16. -donor kidney transplant 16    17. History of AAA (abdominal aortic aneurysm) repair,     18. History of smoking 10-25 pack years, quit , 20 pack-years    19. Long-term use of immunosuppressant medication    20. Prophylactic immunotherapy (transplant immunosuppression)    21. S/P kidney transplant        Plan:

## 2017-01-23 NOTE — DISCHARGE SUMMARY
"Radiology Discharge Summary      Hospital Course: No complications    Admit Date: 1/23/2017  Discharge Date: 01/23/2017     Instructions Given to Patient: Yes  Diet: Resume prior diet  Activity: activity as tolerated    Description of Condition on Discharge: Stable  Vital Signs (Most Recent): Temp: 98.2 °F (36.8 °C) (01/23/17 0836)  Pulse: 76 (01/23/17 1215)  Resp: 18 (01/23/17 1215)  BP: 115/67 (01/23/17 1215)  SpO2: 100 % (01/23/17 1215)    Discharge Disposition: Home    Discharge Diagnosis: complications of renal allograft    Kieran Mccracken MD ([facility])  Radiology PGY-3  [phone]      "

## 2017-01-23 NOTE — IP AVS SNAPSHOT
[facility] [address]  [address]  [facility] [address]  Phone: [phone]           Patient Discharge Instructions     Our goal is to set you up for success. This packet includes information on your condition, medications, and your home care. It will help you to care for yourself so you don't get sicker and need to go back to the hospital.     Please ask your nurse if you have any questions.        There are many details to remember when preparing to leave the hospital. Here is what you will need to do:    1. Take your medicine. If you are prescribed medications, review your Medication List in the following pages. You may have new medications to  at the pharmacy and others that you'll need to stop taking. Review the instructions for how and when to take your medications. Talk with your doctor or nurses if you are unsure of what to do.     2. Go to your follow-up appointments. Specific follow-up information is listed in the following pages. Your may be contacted by a transition nurse or clinical provider about future appointments. Be sure we have all of the phone numbers to reach you, if needed. Please contact your provider's office if you are unable to make an appointment.     3. Watch for warning signs. Your doctor or nurse will give you detailed warning signs to watch for and when to call for assistance. These instructions may also include educational information about your condition. If you experience any of warning signs to your health, call your doctor.               [facility]  Unless otherwise directed by your provider, please contact [facility], our nurse care line that is available for 24/7 assistance.     [phone] (toll-free)    Registered nurses in the [facility] provide clinical advisement, health education, appointment booking, and other advisory services.                    ** Verify the list of medication(s) below is accurate and up  to date. Carry this with you in case of emergency. If your medications have changed, please notify your healthcare provider.             Medication List      ASK your doctor about these medications        Additional Info                      famotidine 20 MG tablet   Commonly known as:  PEPCID   Quantity:  30 tablet   Refills:  11   Dose:  20 mg    Instructions:  Take 1 tablet (20 mg total) by mouth every evening.     Begin Date    AM    Noon    PM    Bedtime       k phos di & mono-sod phos mono 250 mg Tab   Commonly known as:  K-PHOS-NEUTRAL   Quantity:  360 tablet   Refills:  11   Dose:  1000 mg   Indications:  Hypophosphatemia   Comments:  DOSE INCREASE    Instructions:  Take 4 tablets by mouth 3 (three) times daily.     Begin Date    AM    Noon    PM    Bedtime       ketoconazole 200 mg Tab   Commonly known as:  NIZORAL   Quantity:  15 tablet   Refills:  5   Dose:  100 mg    Instructions:  Take 0.5 tablets (100 mg total) by mouth once daily.     Begin Date    AM    Noon    PM    Bedtime       levothyroxine 112 MCG tablet   Commonly known as:  SYNTHROID   Quantity:  30 tablet   Refills:  6   Dose:  112 mcg    Instructions:  Take 1 tablet (112 mcg total) by mouth once daily.     Begin Date    AM    Noon    PM    Bedtime       linezolid 600 mg Tab   Commonly known as:  ZYVOX   Quantity:  14 tablet   Refills:  0   Dose:  600 mg   Indications:  Urinary Tract Infection    Instructions:  Take 1 tablet (600 mg total) by mouth every 12 (twelve) hours.     Begin Date    AM    Noon    PM    Bedtime       magnesium oxide 400 mg tablet   Commonly known as:  MAG-OX   Quantity:  30 tablet   Refills:  11   Dose:  400 mg    Instructions:  Take 1 tablet (400 mg total) by mouth once daily.     Begin Date    AM    Noon    PM    Bedtime       metoprolol tartrate 25 MG tablet   Commonly known as:  LOPRESSOR   Quantity:  30 tablet   Refills:  11   Dose:  12.5 mg    Instructions:  Take 0.5 tablets (12.5 mg total) by mouth 2 (two) times  daily.     Begin Date    AM    Noon    PM    Bedtime       mycophenolate 250 mg Cap   Commonly known as:  CELLCEPT   Quantity:  180 capsule   Refills:  11   Dose:  750 mg   Indications:  Prevention of Kidney Transplant Rejection   Comments:  DOSE DECREASE; Kidney Transplant z94.0; 11/26/16    Instructions:  Take 3 capsules (750 mg total) by mouth 2 (two) times daily. Z94.0/Kidney Transplant on 11/26/16     Begin Date    AM    Noon    PM    Bedtime       nystatin 100,000 unit/mL suspension   Commonly known as:  MYCOSTATIN   Quantity:  473 mL   Refills:  0   Dose:  823410 Units    Instructions:  Take 5 mLs (500,000 Units total) by mouth 4 (four) times daily with meals and nightly. Stop: 2/13/17     Begin Date    AM    Noon    PM    Bedtime       ONE DAILY MULTIVITAMIN per tablet   Refills:  0   Dose:  1 tablet   Generic drug:  multivitamin    Instructions:  Take 1 tablet by mouth once daily.     Begin Date    AM    Noon    PM    Bedtime       oxycodone-acetaminophen 5-325 mg per tablet   Commonly known as:  PERCOCET   Quantity:  40 tablet   Refills:  0   Dose:  1 tablet    Instructions:  Take 1 tablet by mouth every 6 (six) hours as needed.     Begin Date    AM    Noon    PM    Bedtime       predniSONE 10 MG tablet   Commonly known as:  DELTASONE   Quantity:  60 tablet   Refills:  11    Instructions:  Take 20 mg PO QD 1/13-2/12; 15 mg PO QD 2/13-3/12; 10 mg PO QD 3/13-4/12; then 5 mg PO QD thereafter     Begin Date    AM    Noon    PM    Bedtime       sodium bicarbonate 650 MG tablet   Quantity:  180 tablet   Refills:  11   Dose:  1950 mg   Comments:  DOSE INCREASE    Instructions:  Take 3 tablets (1,950 mg total) by mouth 2 (two) times daily.     Begin Date    AM    Noon    PM    Bedtime       sulfamethoxazole-trimethoprim 400-80mg 400-80 mg per tablet   Commonly known as:  BACTRIM,SEPTRA   Quantity:  30 tablet   Refills:  11   Dose:  1 tablet   Indications:  Opportunistic infection prophylaxis   Comments:  DOSE  INCREASE; Kidney Transplant z94.0; 11/26/16    Instructions:  Take 1 tablet by mouth once daily. Stop: 11/26/17     Begin Date    AM    Noon    PM    Bedtime       tacrolimus 1 MG Cap   Commonly known as:  PROGRAF   Quantity:  360 capsule   Refills:  11   Dose:  6 mg   Indications:  Prevention of Kidney Transplant Rejection   Comments:  DOSE DECREASE; Z94.0/Kidney Transplant on 11/26/16    Instructions:  Take 6 capsules (6 mg total) by mouth every 12 (twelve) hours. Z94.0/Kidney Transplant on 11/26/16     Begin Date    AM    Noon    PM    Bedtime       valganciclovir 450 mg Tab   Commonly known as:  VALCYTE   Quantity:  30 tablet   Refills:  3   Dose:  450 mg   Comments:  DOSE INCREASE; Kidney Transplant z94.0; 11/26/16    Instructions:  Take 1 tablet (450 mg total) by mouth once daily. Stop: 2/24/17     Begin Date    AM    Noon    PM    Bedtime                  Please bring to all follow up appointments:    1. A copy of your discharge instructions.  2. All medicines you are currently taking in their original bottles.  3. Identification and insurance card.    Please arrive 15 minutes ahead of scheduled appointment time.    Please call 24 hours in advance if you must reschedule your appointment and/or time.        Your Scheduled Appointments     Jan 23, 2017  2:00 PM CST   Established Patient Visit with MD Brad Fournier gauri - Nephrology ([facility] )    [address]  [place] [address]   [phone]            Jan 30, 2017  8:45 AM CST   Fasting Lab with [facility] ([place])    [address] [facility] [address] [facility] [facility]  [place] [address]   [phone]            Feb 01, 2017  1:30 PM CST   Established Patient with MD Brad Machado [facility]- Transplant ([facility] )    [address]  [place] [address]   [phone]            Feb 02, 2017  3:00 PM CST   New Patient with MD Brad Hayden gauri - Hepatology ([facility] )    [address]  [place]  [facility] [address]   [phone]            Feb 06, 2017  8:15 AM CST   Fasting Lab with LAB, [place]   [facility] ([place])    [address] [address] [phone]                  Discharge Instructions            Discharge Instructions for Kidney Biopsy  You had a procedure called a kidney biopsy. Your doctor used a special needle to remove a small piece of tissue from your kidney to examine it for signs of damage and disease. A kidney biopsy is ordered after other tests have shown that there may be a problem with your kidney. Kidney biopsies are also performed when kidney disease is suspected and to rule out cancer.  Home care  · Rest for 24 hours to 48 hours. Get up only to use the bathroom.  · Dont drive for 24 hours to 48 hours after the procedure.  · Dont shower for 24 hours after the biopsy. If you wish, you may wash yourself with a sponge or washcloth. When you are able to shower, dont scrub the site. Gently wash the area and pat it dry.  · Remove the bandage covering the biopsy site 24 hours to 48 hours after the procedure.  · Dont lift anything heavier than 10 pounds for 3 days to 4 days after the procedure.  · Ask your doctor when you can return to work. Be sure to tell your doctor if your job involves heavy lifting.  · If you normally take blood thinner medications (anticoagulants or antiplatelet medications) and you stopped taking them a few days before your procedure, ask your doctor when to start taking them again.  When to seek medical care  Call your doctor right away if you have any of the following:  · Blood in your urine  · Exhaustion or extreme weakness  · Dizziness or lightheadedness  · Sudden or increased shortness of breath  · Sudden chest pain  · Fever of 100.4°F (38°C) or higher, or chills  · Increasing redness, tenderness, or swelling at the biopsy site  · Opening of or drainage or bleeding from the biopsy site  · Increasing pain, with or without  "activity               Admission Information     Date & Time Provider Department CSN    1/23/2017  7:31 AM Deb Gardner MD [facility] [id number]      Care Providers     Provider Role Specialty Primary office phone    Deb Gardner MD Attending Provider Transplant [phone]    Deb Gardner MD Surgeon  Transplant [phone]      Your Vitals Were     BP Pulse Temp Resp Height Weight    114/66 75 98.2 °F (36.8 °C) (Oral) 17 5' 11" (1.803 m) 59.9 kg (132 lb)    SpO2 BMI             100% 18.41 kg/m2         Recent Lab Values        2/8/2012                           5:05 AM           A1C 5.2                       Allergies as of 1/23/2017     No Known Allergies      Advance Directives     An advance directive is a document which, in the event you are no longer able to make decisions for yourself, tells your healthcare team what kind of treatment you do or do not want to receive, or who you would like to make those decisions for you.  If you do not currently have an advance directive, [facility] encourages you to create one.  For more information call:  [phone]), [phone] ([phone]),  or log on to [email or web address].        Smoking Cessation     If you would like to quit smoking:   You may be eligible for free services if you are a [place] resident and started smoking cigarettes before September 1, 1988.  Call the Smoking Cessation Trust ([facility]) toll free at (638) 170-0746 or (821) 693-0974.   Call 1-800-QUIT-NOW if you do not meet the above criteria.            Language Assistance Services     ATTENTION: Language assistance services are available, free of charge. Please call [phone].      ATENCIÓN: Si habla español, tiene a neri disposición servicios gratuitos de asistencia lingüística. Llame al [phone].     CHÚ Ý: N?u b?n nói Ti?ng Vi?t, có các d?ch v? h? tr? ngôn ng? mi?n phí dành cho b?n. G?i s? [phone].        Chronic Kindey Disease " Education [facility] complies with applicable Federal civil rights laws and does not discriminate on the basis of race, color, national origin, age, disability, or sex.

## 2017-01-23 NOTE — PLAN OF CARE
Problem: Patient Care Overview  Goal: Plan of Care Review  Outcome: Outcome(s) achieved Date Met:  01/23/17  Discharge instructions given and explained to patient and family with verbalization of understanding all instructions.  Patients v/s stable, denies n/v and tolerating po, rates pain level tolerable, IV removed, and family at bedside for patient discharge home. Anesthesia and surgical consent in pt's chart at time of discharge.

## 2017-01-23 NOTE — PROGRESS NOTES
Prograf level still elevated --> if true 12 hour trough decrease Prograf from 6 mg BID to 6/5. Repeat labs on Wednesday

## 2017-01-23 NOTE — DISCHARGE INSTRUCTIONS
Discharge Instructions for Kidney Biopsy  You had a procedure called a kidney biopsy. Your doctor used a special needle to remove a small piece of tissue from your kidney to examine it for signs of damage and disease. A kidney biopsy is ordered after other tests have shown that there may be a problem with your kidney. Kidney biopsies are also performed when kidney disease is suspected and to rule out cancer.  Home care  · Rest for 24 hours to 48 hours. Get up only to use the bathroom.  · Dont drive for 24 hours to 48 hours after the procedure.  · Dont shower for 24 hours after the biopsy. If you wish, you may wash yourself with a sponge or washcloth. When you are able to shower, dont scrub the site. Gently wash the area and pat it dry.  · Remove the bandage covering the biopsy site 24 hours to 48 hours after the procedure.  · Dont lift anything heavier than 10 pounds for 3 days to 4 days after the procedure.  · Ask your doctor when you can return to work. Be sure to tell your doctor if your job involves heavy lifting.  · If you normally take blood thinner medications (anticoagulants or antiplatelet medications) and you stopped taking them a few days before your procedure, ask your doctor when to start taking them again.  When to seek medical care  Call your doctor right away if you have any of the following:  · Blood in your urine  · Exhaustion or extreme weakness  · Dizziness or lightheadedness  · Sudden or increased shortness of breath  · Sudden chest pain  · Fever of 100.4°F (38°C) or higher, or chills  · Increasing redness, tenderness, or swelling at the biopsy site  · Opening of or drainage or bleeding from the biopsy site  · Increasing pain, with or without activity

## 2017-01-23 NOTE — PROCEDURES
"Radiology Post-Procedure Note    Pre Op Diagnosis: Renal transplant ysfunction    Post Op Diagnosis: Same    Procedure: Ultrasound guided renal biopsy    Procedure performed by: Kaylyn Liao MD  And MD Kieran Mccracken    Written Informed Consent Obtained: Yes    Specimen Removed: YES 2 passes    Estimated Blood Loss: Minimal    Findings: Moderate sedation and local anesthesia were used.    A 16-gauge Monopty biopsy device was used to remove 2 specimens from the left-sided renal allograft under ultrasound guidance.  Tissue was evaluated by nephrology for adequacy and sent to pathology for further analysis.      The patient tolerated the procedure well and there were no complications.  Please see Imaging report for further details.    Kieran Mccracken MD ([facility])  Radiology PGY-3  [phone]      "

## 2017-01-23 NOTE — H&P
Radiology History & Physical      SUBJECTIVE:     Chief Complaint: Renal transplant complications    History of Present Illness:  Alin Burkett is a 67 y.o. male who presents for US guided biopsy of renal allograft.    Past Medical History   Diagnosis Date    Acidosis     Adrenal adenoma     Anemia associated with chronic renal failure     Arrhythmia, onset 2015    Awaiting organ transplant status 2013    Basal cell carcinoma 2012     left nasal tip    Blood type B+ 2013    Cancer     Celiac artery dissection     Chronic diarrhea     Chronic urethral stricture     Congenital absence of kidney      left    -donor kidney transplant 16      Induced w Campath 30 mg IV intraoperatively & SoluMedrol 875 mg total over 3 days.  Renal allograft biopsy 17 (DIVINE): 21 glomeruli, none globally sclerosed, <5% interstitial fibrosis, no ACR, c4d negative, AVR CCT Type 2 (V1 lesion); plan THYMO     Dissecting aortic aneurysm (any part), abdominal     Encounter for blood transfusion     ESRD (end stage renal disease) 2010    H/O: urethral stricture     History of AAA (abdominal aortic aneurysm) repair     Hypertension     Hypokalemia     Hypothyroidism 1/10/2014    Inguinal hernia bilateral, non-recurrent     Kidney stones     Organ transplant candidate 2013    Plantar warts 1/10/2014    S/P kidney transplant     Secondary hyperparathyroidism, renal     Thyroid disease      Past Surgical History   Procedure Laterality Date    Gastrojejunostomy      Hernia repair      Hemorrhoid surgery      Aorta - superior mesenteric artery bypass graft      Percutaneous nephrolithotripsy      Right eswl  12    Right  eswl  10/31/12    Bladder neck reconstruction      Abdominal surgery       exploratory lapatomy x 2    Bladder surgery      Cystoscopy      Lithotripsy      Kidney transplant         Home Meds:   Prior to Admission medications     Medication Sig Start Date End Date Taking? Authorizing Provider   famotidine (PEPCID) 20 MG tablet Take 1 tablet (20 mg total) by mouth every evening. 11/28/16  Yes Travis Zimmerman MD   k phos di & mono-sod phos mono (K-PHOS-NEUTRAL) 250 mg Tab Take 4 tablets by mouth 3 (three) times daily. 1/20/17 1/20/18 Yes Deb Gardner MD   ketoconazole (NIZORAL) 200 mg Tab Take 0.5 tablets (100 mg total) by mouth once daily. 1/16/17  Yes Travis Zimmerman MD   levothyroxine (SYNTHROID) 112 MCG tablet Take 1 tablet (112 mcg total) by mouth once daily. 1/23/15  Yes Kristie Copeland PA-C   linezolid (ZYVOX) 600 mg Tab Take 1 tablet (600 mg total) by mouth every 12 (twelve) hours. 1/16/17 1/23/17 Yes Travis Zimmerman MD   magnesium oxide (MAG-OX) 400 mg tablet Take 1 tablet (400 mg total) by mouth once daily. 12/22/16  Yes Travis Zimmerman MD   metoprolol tartrate (LOPRESSOR) 25 MG tablet Take 0.5 tablets (12.5 mg total) by mouth 2 (two) times daily. 1/16/17  Yes Travis Zimmerman MD   multivitamin (ONE DAILY MULTIVITAMIN) per tablet Take 1 tablet by mouth once daily.   Yes Historical Provider, MD   mycophenolate (CELLCEPT) 250 mg Cap Take 3 capsules (750 mg total) by mouth 2 (two) times daily. Z94.0/Kidney Transplant on 11/26/16 1/16/17  Yes Travis Zimmerman MD   nystatin (MYCOSTATIN) 100,000 unit/mL suspension Take 5 mLs (500,000 Units total) by mouth 4 (four) times daily with meals and nightly. Stop: 2/13/17 1/16/17  Yes Travis Zimmerman MD   oxycodone-acetaminophen (PERCOCET) 5-325 mg per tablet Take 1 tablet by mouth every 6 (six) hours as needed. 12/28/16  Yes Andrew Lopez Jr., MD   predniSONE (DELTASONE) 10 MG tablet Take 20 mg PO QD 1/13-2/12; 15 mg PO QD 2/13-3/12; 10 mg PO QD 3/13-4/12; then 5 mg PO QD thereafter 1/16/17  Yes Travis Zimmerman MD   sodium bicarbonate 650 MG tablet Take 3 tablets (1,950 mg total) by mouth 2 (two) times daily. 1/4/17  Yes Deb Gardner MD   sulfamethoxazole-trimethoprim 400-80mg  (BACTRIM,SEPTRA) 400-80 mg per tablet Take 1 tablet by mouth once daily. Stop: 11/26/17 12/14/16  Yes Travis Zimmerman MD   tacrolimus (PROGRAF) 1 MG Cap Take 6 capsules (6 mg total) by mouth every 12 (twelve) hours. Z94.0/Kidney Transplant on 11/26/16 1/20/17 1/20/18 Yes Deb Gardner MD   valganciclovir (VALCYTE) 450 mg Tab Take 1 tablet (450 mg total) by mouth once daily. Stop: 2/24/17 12/14/16  Yes Travis Zimmerman MD     Anticoagulants/Antiplatelets: no anticoagulation    Allergies: Review of patient's allergies indicates:  No Known Allergies  Sedation History:  no adverse reactions    Review of Systems:   Hematological: no known coagulopathies  Respiratory: no shortness of breath  Cardiovascular: no chest pain  Gastrointestinal: no abdominal pain  Genito-Urinary: no dysuria  Musculoskeletal: negative  Neurological: no TIA or stroke symptoms         OBJECTIVE:     Vital Signs (Most Recent)  Temp: 98.2 °F (36.8 °C) (01/23/17 0836)  Pulse: 78 (01/23/17 0836)  Resp: 16 (01/23/17 0836)  BP: 125/72 (01/23/17 0836)  SpO2: 100 % (01/23/17 0836)    Physical Exam:  ASA: 2  Mallampati: 2    General: no acute distress  Mental Status: alert and oriented to person, place and time  HEENT: normocephalic, atraumatic  Chest: unlabored breathing  Heart: regular heart rate  Abdomen: nondistended  Extremity: moves all extremities    Laboratory  Lab Results   Component Value Date    INR 0.9 01/23/2017       Lab Results   Component Value Date    WBC 4.78 01/23/2017    HGB 11.0 (L) 01/23/2017    HCT 32.5 (L) 01/23/2017    MCV 91 01/23/2017     (L) 01/23/2017      Lab Results   Component Value Date    GLU 94 01/23/2017     01/23/2017    K 3.8 01/23/2017     (H) 01/23/2017    CO2 25 01/23/2017    BUN 23 01/23/2017    CREATININE 1.2 01/23/2017    CALCIUM 8.7 01/23/2017    MG 1.7 01/20/2017    ALT 61 (H) 01/16/2017    AST 14 01/16/2017    ALBUMIN 3.1 (L) 01/23/2017    BILITOT 0.3 01/16/2017    BILIDIR 0.2 01/09/2017  "      ASSESSMENT/PLAN:     Sedation Plan: Local  Patient will undergo US guided biopsy of renal allograft.    Kieran Mccracken MD ([facility])  Radiology PGY-3  [phone]      "

## 2017-01-27 RX ORDER — TACROLIMUS 1 MG/1
CAPSULE ORAL
Qty: 330 CAPSULE | Refills: 11 | Status: SHIPPED | OUTPATIENT
Start: 2017-01-27 | End: 2017-02-01 | Stop reason: DRUGHIGH

## 2017-01-27 NOTE — TELEPHONE ENCOUNTER
Notified patient of Dr. Gardner's review of 1/23/17 lab results. Instructed patient to decrease prograf to 6mg in AM & 5mg in PM. Patient verbalized understanding.

## 2017-01-27 NOTE — TELEPHONE ENCOUNTER
----- Message from Deb Gardner MD sent at 1/23/2017 10:08 AM CST -----  Prograf level still elevated --> if true 12 hour trough decrease Prograf from 6 mg BID to 6/5. Repeat labs on Wednesday

## 2017-01-30 ENCOUNTER — LAB VISIT (OUTPATIENT)
Dept: LAB | Facility: HOSPITAL | Age: 67
End: 2017-01-30
Attending: INTERNAL MEDICINE
Payer: MEDICARE

## 2017-01-30 DIAGNOSIS — Z94.0 STATUS POST KIDNEY TRANSPLANT: ICD-10-CM

## 2017-01-30 DIAGNOSIS — Z79.899 ENCOUNTER FOR LONG-TERM (CURRENT) USE OF OTHER MEDICATIONS: ICD-10-CM

## 2017-01-30 LAB
ALBUMIN SERPL BCP-MCNC: 3.3 G/DL
ANION GAP SERPL CALC-SCNC: 5 MMOL/L
ANISOCYTOSIS BLD QL SMEAR: SLIGHT
BASOPHILS # BLD AUTO: ABNORMAL K/UL
BASOPHILS NFR BLD: 0 %
BUN SERPL-MCNC: 21 MG/DL
BURR CELLS BLD QL SMEAR: ABNORMAL
CALCIUM SERPL-MCNC: 8.3 MG/DL
CHLORIDE SERPL-SCNC: 111 MMOL/L
CO2 SERPL-SCNC: 24 MMOL/L
CREAT SERPL-MCNC: 1.1 MG/DL
DIFFERENTIAL METHOD: ABNORMAL
EOSINOPHIL # BLD AUTO: ABNORMAL K/UL
EOSINOPHIL NFR BLD: 0 %
ERYTHROCYTE [DISTWIDTH] IN BLOOD BY AUTOMATED COUNT: 16.7 %
EST. GFR  (AFRICAN AMERICAN): >60 ML/MIN/1.73 M^2
EST. GFR  (NON AFRICAN AMERICAN): >60 ML/MIN/1.73 M^2
GLUCOSE SERPL-MCNC: 86 MG/DL
HCT VFR BLD AUTO: 30.2 %
HGB BLD-MCNC: 10.2 G/DL
HYPOCHROMIA BLD QL SMEAR: ABNORMAL
LYMPHOCYTES # BLD AUTO: ABNORMAL K/UL
LYMPHOCYTES NFR BLD: 1 %
MAGNESIUM SERPL-MCNC: 1.9 MG/DL
MCH RBC QN AUTO: 30.9 PG
MCHC RBC AUTO-ENTMCNC: 33.8 %
MCV RBC AUTO: 92 FL
MONOCYTES # BLD AUTO: ABNORMAL K/UL
MONOCYTES NFR BLD: 5 %
NEUTROPHILS NFR BLD: 94 %
OVALOCYTES BLD QL SMEAR: ABNORMAL
PHOSPHATE SERPL-MCNC: 2.5 MG/DL
PLATELET # BLD AUTO: 135 K/UL
PLATELET BLD QL SMEAR: ABNORMAL
PMV BLD AUTO: 11 FL
POIKILOCYTOSIS BLD QL SMEAR: SLIGHT
POLYCHROMASIA BLD QL SMEAR: ABNORMAL
POTASSIUM SERPL-SCNC: 4.2 MMOL/L
RBC # BLD AUTO: 3.3 M/UL
SODIUM SERPL-SCNC: 140 MMOL/L
WBC # BLD AUTO: 7.36 K/UL

## 2017-01-30 PROCEDURE — 80069 RENAL FUNCTION PANEL: CPT

## 2017-01-30 PROCEDURE — 85027 COMPLETE CBC AUTOMATED: CPT

## 2017-01-30 PROCEDURE — 85007 BL SMEAR W/DIFF WBC COUNT: CPT

## 2017-01-30 PROCEDURE — 80197 ASSAY OF TACROLIMUS: CPT

## 2017-01-30 PROCEDURE — 83735 ASSAY OF MAGNESIUM: CPT

## 2017-01-30 PROCEDURE — 36415 COLL VENOUS BLD VENIPUNCTURE: CPT | Mod: PO

## 2017-01-31 LAB — TACROLIMUS BLD-MCNC: 7.9 NG/ML

## 2017-02-01 ENCOUNTER — OFFICE VISIT (OUTPATIENT)
Dept: TRANSPLANT | Facility: CLINIC | Age: 67
End: 2017-02-01
Payer: MEDICARE

## 2017-02-01 VITALS
BODY MASS INDEX: 18.95 KG/M2 | HEIGHT: 71 IN | WEIGHT: 135.38 LBS | DIASTOLIC BLOOD PRESSURE: 85 MMHG | OXYGEN SATURATION: 100 % | RESPIRATION RATE: 17 BRPM | SYSTOLIC BLOOD PRESSURE: 122 MMHG | TEMPERATURE: 98 F | HEART RATE: 74 BPM

## 2017-02-01 DIAGNOSIS — Z29.89 PROPHYLACTIC IMMUNOTHERAPY: Primary | ICD-10-CM

## 2017-02-01 DIAGNOSIS — N25.81 SECONDARY HYPERPARATHYROIDISM, RENAL: ICD-10-CM

## 2017-02-01 DIAGNOSIS — D63.8 ANEMIA OF CHRONIC DISEASE: ICD-10-CM

## 2017-02-01 DIAGNOSIS — Z94.0 DECEASED-DONOR KIDNEY TRANSPLANT: ICD-10-CM

## 2017-02-01 DIAGNOSIS — E83.39 HYPOPHOSPHATEMIA: ICD-10-CM

## 2017-02-01 PROCEDURE — 99214 OFFICE O/P EST MOD 30 MIN: CPT | Mod: S$PBB,,, | Performed by: INTERNAL MEDICINE

## 2017-02-01 PROCEDURE — 99214 OFFICE O/P EST MOD 30 MIN: CPT | Mod: PBBFAC | Performed by: INTERNAL MEDICINE

## 2017-02-01 PROCEDURE — 99999 PR PBB SHADOW E&M-EST. PATIENT-LVL IV: CPT | Mod: PBBFAC,,, | Performed by: INTERNAL MEDICINE

## 2017-02-01 RX ORDER — TACROLIMUS 1 MG/1
6 CAPSULE ORAL EVERY 12 HOURS
Qty: 360 CAPSULE | Refills: 11 | Status: SHIPPED | OUTPATIENT
Start: 2017-02-01 | End: 2017-03-02 | Stop reason: DRUGHIGH

## 2017-02-01 NOTE — TELEPHONE ENCOUNTER
Notified patient of Dr. Zimmerman review of 1/30/17 lab results. Instructed patient to increase prograf to 6mg twice daily; repeat labs 2/6/17. Patient verbalized understanding.

## 2017-02-01 NOTE — TELEPHONE ENCOUNTER
----- Message from Travis Zimmerman MD sent at 1/31/2017  3:44 PM CST -----  Please increase prograf to 6 bid repeat labs in 1 wwk

## 2017-02-01 NOTE — LETTER
February 1, 2017                     [address]- Transplant  [address]  [facility] [address]  Phone: [phone]   Patient: Alin Burktet   MR Number: [id number]   YOB: 1950   Date of Visit: 2/1/2017       Dear      Thank you for referring Alin Burkett to me for evaluation. Attached you will find relevant portions of my assessment and plan of care.    If you have questions, please do not hesitate to call me. I look forward to following Alin Burkett along with you.    Sincerely,    Chasidy Mcclain MD    Enclosure    If you would like to receive this communication electronically, please contact [email or web address] or [phone] to request [email or web address] Link access.    [email or web address] Link is a tool which provides read-only access to select patient information with whom you have a relationship. Its easy to use and provides real time access to review your patients record including encounter summaries, notes, results, and demographic information.    If you feel you have received this communication in error or would no longer like to receive these types of communications, please e-mail [email or web address]

## 2017-02-01 NOTE — PROGRESS NOTES
Kidney Post-Transplant Assessment    Referring Physician: Lake Merchant  Current Nephrologist:     ORGAN: LEFT KIDNEY  Donor Type:  - brain death 67 y.o.   PHS Increased Risk: yes  Cold Ischemia: 854 mins  Induction Medications: campath - alemtuzumab (anti-cd52)    Subjective:     CC:  Reassessment of renal allograft function and management of chronic immunosuppression.    HPI: Mr. Burkett is a 66 y.o male with PMH of  ESRD HD due to HTN  and congenital absent left kidney who was on dialysis since 6/16/10 until receiving PHS increased risk DDRT (pumped kidney, Campath induction, KDPI 36%, WIT 30 minutes, CIT 14 hours and 14 minutes, donor RPR positive thus patient completed PCN x3, CMV D+/R+) on 16. His maintenance immunosuppression consists of mycophenolate mofetil and tacrolimus. He is on Bactrim X 1 year and Valcyte X 3 months.     Post Transplant Course: surgery had no complication. Pt had DIVINE with suspected rejection on . Pt did receive 1 dose of SMP after labs drawn. Kid bx  revealed AVR and he was d/c'd home while awaiting results.  He was readmitted  to begin tx with thymo x 5-7 doses. DSA  neg.  He tolerated Thymo dose #1 w/o comp.  Dose #2 held due to fever 100.5.  No further fever.  Blood cx w NGTD.  U cx resulted (+) enterococcus >100,000.  He treated w Zyvox for total of 10 days. He received Thymo dose #2 on ; Thymo HALF dose #3; Thymo HALF dose #4 and also received Neupogen 480 mcg SC 17; and Thymo full dose #5 on 1/15. Creatinine trended to 1.2. He was rebiopsied on 17 which showed acute tubular injury and vacuolization, with + myoglobulin and negative heme.    Interval History:Since last biopsy he denies any hospitalizations or ER visits.  He states his incision has healed well, he is tolerating his immunosuppression w/o problems or financial concerns, he has no pain over allograft, and voids without problems.       Past Medical History:  Past Medical  History   Diagnosis Date    Acidosis     Adrenal adenoma     Anemia associated with chronic renal failure     Arrhythmia, onset 2015    Awaiting organ transplant status 2013    Basal cell carcinoma 2012     left nasal tip    Blood type B+ 2013    Cancer     Celiac artery dissection     Chronic diarrhea     Chronic urethral stricture     Congenital absence of kidney      left    -donor kidney transplant 16      Induced w Campath 30 mg IV intraoperatively & SoluMedrol 875 mg total over 3 days.  Renal allograft biopsy 17 (DIVINE): 21 glomeruli, none globally sclerosed, <5% interstitial fibrosis, no ACR, c4d negative, AVR CCT Type 2 (V1 lesion); plan THYMO     Dissecting aortic aneurysm (any part), abdominal     Encounter for blood transfusion     ESRD (end stage renal disease) 2010    H/O: urethral stricture     History of AAA (abdominal aortic aneurysm) repair     Hypertension     Hypokalemia     Hypothyroidism 1/10/2014    Inguinal hernia bilateral, non-recurrent     Kidney stones     Organ transplant candidate 2013    Plantar warts 1/10/2014    S/P kidney transplant     Secondary hyperparathyroidism, renal     Thyroid disease        Current Medication  Current Outpatient Prescriptions   Medication Sig Dispense Refill    famotidine (PEPCID) 20 MG tablet Take 1 tablet (20 mg total) by mouth every evening. 30 tablet 11    k phos di & mono-sod phos mono (K-PHOS-NEUTRAL) 250 mg Tab Take 4 tablets by mouth 3 (three) times daily. 360 tablet 11    ketoconazole (NIZORAL) 200 mg Tab Take 0.5 tablets (100 mg total) by mouth once daily. 15 tablet 5    levothyroxine (SYNTHROID) 112 MCG tablet Take 1 tablet (112 mcg total) by mouth once daily. 30 tablet 6    magnesium oxide (MAG-OX) 400 mg tablet Take 1 tablet (400 mg total) by mouth once daily. 30 tablet 11    metoprolol tartrate (LOPRESSOR) 25 MG tablet Take 0.5 tablets (12.5 mg total) by  mouth 2 (two) times daily. 30 tablet 11    multivitamin (ONE DAILY MULTIVITAMIN) per tablet Take 1 tablet by mouth once daily.      mycophenolate (CELLCEPT) 250 mg Cap Take 3 capsules (750 mg total) by mouth 2 (two) times daily. Z94.0/Kidney Transplant on 11/26/16 180 capsule 11    nystatin (MYCOSTATIN) 100,000 unit/mL suspension Take 5 mLs (500,000 Units total) by mouth 4 (four) times daily with meals and nightly. Stop: 2/13/17 473 mL 0    oxycodone-acetaminophen (PERCOCET) 5-325 mg per tablet Take 1 tablet by mouth every 6 (six) hours as needed. 40 tablet 0    predniSONE (DELTASONE) 10 MG tablet Take 20 mg PO QD 1/13-2/12; 15 mg PO QD 2/13-3/12; 10 mg PO QD 3/13-4/12; then 5 mg PO QD thereafter 60 tablet 11    sodium bicarbonate 650 MG tablet Take 3 tablets (1,950 mg total) by mouth 2 (two) times daily. 180 tablet 11    sulfamethoxazole-trimethoprim 400-80mg (BACTRIM,SEPTRA) 400-80 mg per tablet Take 1 tablet by mouth once daily. Stop: 11/26/17 30 tablet 11    tacrolimus (PROGRAF) 1 MG Cap Take 6 capsules (6 mg total) by mouth every 12 (twelve) hours. Z94.0/Kidney Transplant on 11/26/16 360 capsule 11    valganciclovir (VALCYTE) 450 mg Tab Take 1 tablet (450 mg total) by mouth once daily. Stop: 2/24/17 30 tablet 3     Current Facility-Administered Medications   Medication Dose Route Frequency Provider Last Rate Last Dose    loperamide capsule 2 mg  2 mg Oral QID PRN Anderw Lopez Jr., MD           Allergy    Review of Systems    Constitutional: Negative for fever, appetite change and fatigue.   HENT: Negative for hearing loss, sore throat and mouth sores.   Eyes: Negative for photophobia, pain and visual disturbance.   Respiratory: Negative for cough, chest tightness, shortness of breath and wheezing.   Cardiovascular: Negative for chest pain, palpitations and leg swelling.   Gastrointestinal: Negative for nausea, vomiting, abdominal pain, diarrhea, constipation, blood in stool and abdominal  "distention.   Genitourinary: Negative for dysuria, urgency, frequency, hematuria, decreased urine volume, difficulty urinating  Musculoskeletal: Negative for back pain, joint swelling, arthralgias and gait problem.   Skin: Negative for pallor, rash and wound.   Neurological: Negative for dizziness, tremors, syncope, weakness, light-headedness and headaches.   Psychiatric/Behavioral: Negative for confusion, sleep disturbance and dysphoric mood. The patient is not nervous/anxious.       Objective:     Blood pressure 122/85, pulse 74, temperature 98 °F (36.7 °C), temperature source Oral, resp. rate 17, height 5' 11" (1.803 m), weight 61.4 kg (135 lb 5.8 oz), SpO2 100 %.  body mass index is 18.88 kg/(m^2).    Physical Exam  General: No acute distress, well groomed  HEENT: Normocephalic, external inspection of ears and nose normal, moist mucous membranes, no oral ulcerations/lesions  Neck: Supple, symmetrical, trachea midline  Respiratory: Clear to auscultation bilaterally, respirations unlabored, no rales/rhonchi/wheezing  Cardiovacular: Regular rate and rhythm, S1, S2 normal, no murmurs, rubs or gallops, no JVD, no carotid bruit  Gastrointestinal: Soft, non-tender, bowel sounds normal  Renal allograft exam: No tenderness, no bruits, normal exam, mid line abdominal wall  and RLQ surgical scars  Musculoskeletal: No knee or ankle joint tenderness or swelling.   Extremities: No clubbing or cyanosis of bilateral upper extremities; no lower extremity edema bilaterally, radial pulses 2+ bilaterally, symmetric  Skin: warm and dry; no rash on exposed skin  Neurologic: CN grossly intact,  alert and oriented x 3    Labs:  Lab Results   Component Value Date    WBC 7.36 01/30/2017    HGB 10.2 (L) 01/30/2017    HCT 30.2 (L) 01/30/2017     01/30/2017    K 4.2 01/30/2017     (H) 01/30/2017    CO2 24 01/30/2017    BUN 21 01/30/2017    CREATININE 1.1 01/30/2017    EGFRNONAA >60.0 01/30/2017    CALCIUM 8.3 (L) 01/30/2017    " PHOS 2.5 (L) 2017    MG 1.9 2017    ALBUMIN 3.3 (L) 2017    AST 14 2017    ALT 61 (H) 2017    UTPCR 0.10 2017    .0 (H) 2016    TACROLIMUS 7.9 2017    CYCLOSPORINE <10 (L) 01/10/2017       No results found for: EXTANC, EXTWBC, EXTSEGS, EXTPLATELETS, EXTHEMOGLOBI, EXTHEMATOCRI, EXTCREATININ, EXTSODIUM, EXTPOTASSIUM, EXTBUN, EXTCO2, EXTCALCIUM, EXTPHOSPHORU, EXTGLUCOSE, EXTALBUMIN, EXTAST, EXTALT, EXTBILITOTAL, EXTLIPASE, EXTAMYLASE    No results found for: EXTCYCLOSLVL, EXTSIROLIMUS, EXTTACROLVL, EXTPROTCRE, EXTPTHINTACT, EXTPROTEINUA, EXTWBCUA, EXTRBCUA    Labs were reviewed with the patient    Assessment/Plan:     1. Prophylactic immunotherapy (transplant immunosuppression)    2. -donor kidney transplant 16    3. Anemia of chronic disease    4. Secondary hyperparathyroidism, renal    5. Hypophosphatemia        Mr. Burkett is a 67 y.o. male with:       # History of ESRD presumed secondary to HTN, and congenital solitary kidney  - S/p DDKT (pumped kidney, Campath induction, KDPI 36%, WIT 30 minutes, CIT 14 hours and 14 minutes, donor RPR positive thus patient completed PCN x3, CMV D+/R+) on 16.   - DIVINE due to vascular rejection on 17, treated with solumedrol and thymo X total 4 complete dose. Cr of 1.2 mg/dL after treatment. Second biopsy on 17 showed acute tubular injury and vacuolization, with + myoglobulin and negative heme.  - will check UA, urine pro/cr ratio, myoglobulin, serum myoglobulin and CPK      # Immunosuppression:   - continue Prograf , the level is acceptable, no change today.  - continue MMF  - continue prednisone taper to 5 mg daily   - will monitor closely for toxicities    # Antimicrobial Prophylaxis:   - continue bactrim for PJP prophylaxis  - continue Valcyte for CMV prophylaxis     # Infectious Surveillance:   - last CMV: negative  - last BK PCR: negative    # HTN:   - BPs well controlled   - continue with home  blood pressure monitoring  - low salt and healthy life discussed with the patient    # Metabolic Bone Disease/Secondary Hyperparathyroidism:  - on K-phos 4 tabs TID, encouraged to take nuts   - Corrected Ca is at normal range  - last PTH: 215 pg/ml  - will monitor PTH, calcium, and phosphorus as per our center protocol    # Anemia of chronic disease:   - Hb of 10.2  - will continue monitoring as per our center guidelines.   - No indication for acute intervention today    # History of basal cell carcinoma:  - He follows with dermatology    # Hypothyroidism  - on levothyroxine supplement    # Transaminitis:  - improving, close to normal range,  will monitor.    # Low BMI  - encourage the patient to take protein/callorie rich diet  - will monitor          Follow-up:   - Clinic: return to transplant clinic weekly for the first month after transplant; every 2 weeks during months 2-3; then at 6-, 9-, 12-, 18-, 24-, and 36- months post-transplant to reassess for complications from immunosuppression toxicity and monitor for rejection.  Annually thereafter.    - Labs: since patient remains at high risk for rejection and drug-related complications that warrant close monitoring, labs will be ordered as follows: continue twice weekly CBC, renal panel, and drug level for first month; then same labs once weekly through 3rd month post-transplant.  Urine for UA and protein/creatinine ratio monthly.  Serum BK - PCR at 1-, 3-, 6-, 9-, 12-, 18-, 24-, and 36- months post-transplant.  Hepatic panel at 1-, 2-, 3-, 6-, 9-, 12-, 18-, 24-, and 36- months post-transplant.    Chasidy Mcclain MD       Education:   Material provided to the patient.  Patient reminded to call with any health changes since these can be early signs of significant complications.  Also, I advised the patient to be sure any new medications or changes of old medications are discussed with either a pharmacist or physician knowledgeable with transplant to avoid  rejection/drug toxicity related to significant drug interactions.

## 2017-02-06 ENCOUNTER — LAB VISIT (OUTPATIENT)
Dept: LAB | Facility: HOSPITAL | Age: 67
End: 2017-02-06
Attending: INTERNAL MEDICINE
Payer: MEDICARE

## 2017-02-06 DIAGNOSIS — Z79.899 ENCOUNTER FOR LONG-TERM (CURRENT) USE OF OTHER MEDICATIONS: ICD-10-CM

## 2017-02-06 DIAGNOSIS — Z94.0 STATUS POST KIDNEY TRANSPLANT: ICD-10-CM

## 2017-02-06 LAB
ALBUMIN SERPL BCP-MCNC: 3.6 G/DL
ANION GAP SERPL CALC-SCNC: 7 MMOL/L
ANISOCYTOSIS BLD QL SMEAR: SLIGHT
BASOPHILS # BLD AUTO: ABNORMAL K/UL
BASOPHILS NFR BLD: 0 %
BUN SERPL-MCNC: 25 MG/DL
BURR CELLS BLD QL SMEAR: ABNORMAL
CALCIUM SERPL-MCNC: 8.7 MG/DL
CHLORIDE SERPL-SCNC: 114 MMOL/L
CO2 SERPL-SCNC: 22 MMOL/L
CREAT SERPL-MCNC: 1.1 MG/DL
DIFFERENTIAL METHOD: ABNORMAL
EOSINOPHIL # BLD AUTO: ABNORMAL K/UL
EOSINOPHIL NFR BLD: 3 %
ERYTHROCYTE [DISTWIDTH] IN BLOOD BY AUTOMATED COUNT: 18.2 %
EST. GFR  (AFRICAN AMERICAN): >60 ML/MIN/1.73 M^2
EST. GFR  (NON AFRICAN AMERICAN): >60 ML/MIN/1.73 M^2
GLUCOSE SERPL-MCNC: 85 MG/DL
HCT VFR BLD AUTO: 33.3 %
HGB BLD-MCNC: 11 G/DL
HYPOCHROMIA BLD QL SMEAR: ABNORMAL
LYMPHOCYTES # BLD AUTO: ABNORMAL K/UL
LYMPHOCYTES NFR BLD: 2 %
MAGNESIUM SERPL-MCNC: 1.8 MG/DL
MCH RBC QN AUTO: 31.8 PG
MCHC RBC AUTO-ENTMCNC: 33 %
MCV RBC AUTO: 96 FL
MONOCYTES # BLD AUTO: ABNORMAL K/UL
MONOCYTES NFR BLD: 5 %
NEUTROPHILS NFR BLD: 90 %
OVALOCYTES BLD QL SMEAR: ABNORMAL
PHOSPHATE SERPL-MCNC: 2.6 MG/DL
PLATELET # BLD AUTO: 182 K/UL
PLATELET BLD QL SMEAR: ABNORMAL
PMV BLD AUTO: 11.1 FL
POIKILOCYTOSIS BLD QL SMEAR: SLIGHT
POTASSIUM SERPL-SCNC: 3.7 MMOL/L
RBC # BLD AUTO: 3.46 M/UL
SCHISTOCYTES BLD QL SMEAR: ABNORMAL
SODIUM SERPL-SCNC: 143 MMOL/L
WBC # BLD AUTO: 4.84 K/UL

## 2017-02-06 PROCEDURE — 83735 ASSAY OF MAGNESIUM: CPT

## 2017-02-06 PROCEDURE — 85027 COMPLETE CBC AUTOMATED: CPT

## 2017-02-06 PROCEDURE — 80069 RENAL FUNCTION PANEL: CPT

## 2017-02-06 PROCEDURE — 85007 BL SMEAR W/DIFF WBC COUNT: CPT

## 2017-02-06 PROCEDURE — 36415 COLL VENOUS BLD VENIPUNCTURE: CPT | Mod: PO

## 2017-02-06 PROCEDURE — 80197 ASSAY OF TACROLIMUS: CPT

## 2017-02-07 LAB — TACROLIMUS BLD-MCNC: 9.4 NG/ML

## 2017-02-10 ENCOUNTER — LAB VISIT (OUTPATIENT)
Dept: LAB | Facility: HOSPITAL | Age: 67
End: 2017-02-10
Attending: INTERNAL MEDICINE
Payer: MEDICARE

## 2017-02-10 ENCOUNTER — TELEPHONE (OUTPATIENT)
Dept: TRANSPLANT | Facility: CLINIC | Age: 67
End: 2017-02-10

## 2017-02-10 DIAGNOSIS — N30.01 ACUTE CYSTITIS WITH HEMATURIA: ICD-10-CM

## 2017-02-10 DIAGNOSIS — N30.01 ACUTE CYSTITIS WITH HEMATURIA: Primary | ICD-10-CM

## 2017-02-10 LAB
BACTERIA #/AREA URNS AUTO: ABNORMAL /HPF
BILIRUB UR QL STRIP: NEGATIVE
CLARITY UR REFRACT.AUTO: ABNORMAL
COLOR UR AUTO: YELLOW
GLUCOSE UR QL STRIP: NEGATIVE
HGB UR QL STRIP: NEGATIVE
KETONES UR QL STRIP: NEGATIVE
LEUKOCYTE ESTERASE UR QL STRIP: ABNORMAL
MICROSCOPIC COMMENT: ABNORMAL
NITRITE UR QL STRIP: NEGATIVE
PH UR STRIP: 7 [PH] (ref 5–8)
PROT UR QL STRIP: NEGATIVE
RBC #/AREA URNS AUTO: 4 /HPF (ref 0–4)
SP GR UR STRIP: 1.02 (ref 1–1.03)
URN SPEC COLLECT METH UR: ABNORMAL
UROBILINOGEN UR STRIP-ACNC: NEGATIVE EU/DL
WBC #/AREA URNS AUTO: >100 /HPF (ref 0–5)

## 2017-02-10 PROCEDURE — 87077 CULTURE AEROBIC IDENTIFY: CPT

## 2017-02-10 PROCEDURE — 87088 URINE BACTERIA CULTURE: CPT

## 2017-02-10 PROCEDURE — 81001 URINALYSIS AUTO W/SCOPE: CPT

## 2017-02-10 PROCEDURE — 87086 URINE CULTURE/COLONY COUNT: CPT

## 2017-02-10 PROCEDURE — 87186 SC STD MICRODIL/AGAR DIL: CPT

## 2017-02-10 NOTE — TELEPHONE ENCOUNTER
Received phone call from patient C/O Burning with Urination x several days. Patient will submit urine today in slidelll and voice a understanding if pain gets worse or temp greater than 100.4 he will present to the ER.

## 2017-02-13 ENCOUNTER — LAB VISIT (OUTPATIENT)
Dept: LAB | Facility: HOSPITAL | Age: 67
End: 2017-02-13
Attending: INTERNAL MEDICINE
Payer: MEDICARE

## 2017-02-13 DIAGNOSIS — Z94.0 STATUS POST KIDNEY TRANSPLANT: ICD-10-CM

## 2017-02-13 DIAGNOSIS — Z79.899 ENCOUNTER FOR LONG-TERM (CURRENT) USE OF OTHER MEDICATIONS: ICD-10-CM

## 2017-02-13 LAB
ALBUMIN SERPL BCP-MCNC: 3.3 G/DL
ANION GAP SERPL CALC-SCNC: 8 MMOL/L
ANISOCYTOSIS BLD QL SMEAR: SLIGHT
BASOPHILS NFR BLD: 0 %
BUN SERPL-MCNC: 26 MG/DL
BURR CELLS BLD QL SMEAR: ABNORMAL
CALCIUM SERPL-MCNC: 8.6 MG/DL
CHLORIDE SERPL-SCNC: 112 MMOL/L
CO2 SERPL-SCNC: 21 MMOL/L
CREAT SERPL-MCNC: 1.3 MG/DL
DIFFERENTIAL METHOD: ABNORMAL
EOSINOPHIL NFR BLD: 0 %
ERYTHROCYTE [DISTWIDTH] IN BLOOD BY AUTOMATED COUNT: 18.6 %
EST. GFR  (AFRICAN AMERICAN): >60 ML/MIN/1.73 M^2
EST. GFR  (NON AFRICAN AMERICAN): 56.9 ML/MIN/1.73 M^2
GLUCOSE SERPL-MCNC: 85 MG/DL
HCT VFR BLD AUTO: 33.4 %
HGB BLD-MCNC: 10.9 G/DL
LYMPHOCYTES NFR BLD: 5 %
MAGNESIUM SERPL-MCNC: 1.7 MG/DL
MCH RBC QN AUTO: 31.9 PG
MCHC RBC AUTO-ENTMCNC: 32.6 %
MCV RBC AUTO: 98 FL
METAMYELOCYTES NFR BLD MANUAL: 3 %
MONOCYTES NFR BLD: 10 %
MYELOCYTES NFR BLD MANUAL: 4 %
NEUTROPHILS NFR BLD: 70 %
NEUTS BAND NFR BLD MANUAL: 8 %
OVALOCYTES BLD QL SMEAR: ABNORMAL
PHOSPHATE SERPL-MCNC: 2.3 MG/DL
PLATELET # BLD AUTO: 152 K/UL
PMV BLD AUTO: 11.1 FL
POIKILOCYTOSIS BLD QL SMEAR: SLIGHT
POLYCHROMASIA BLD QL SMEAR: ABNORMAL
POTASSIUM SERPL-SCNC: 3.3 MMOL/L
RBC # BLD AUTO: 3.42 M/UL
SCHISTOCYTES BLD QL SMEAR: ABNORMAL
SODIUM SERPL-SCNC: 141 MMOL/L
WBC # BLD AUTO: 5.16 K/UL

## 2017-02-13 PROCEDURE — 83735 ASSAY OF MAGNESIUM: CPT

## 2017-02-13 PROCEDURE — 36415 COLL VENOUS BLD VENIPUNCTURE: CPT | Mod: PO

## 2017-02-13 PROCEDURE — 85007 BL SMEAR W/DIFF WBC COUNT: CPT

## 2017-02-13 PROCEDURE — 85027 COMPLETE CBC AUTOMATED: CPT

## 2017-02-13 PROCEDURE — 80197 ASSAY OF TACROLIMUS: CPT

## 2017-02-13 PROCEDURE — 80069 RENAL FUNCTION PANEL: CPT

## 2017-02-14 LAB
BACTERIA UR CULT: NORMAL
TACROLIMUS BLD-MCNC: 8.1 NG/ML

## 2017-02-14 RX ORDER — CIPROFLOXACIN 500 MG/1
500 TABLET ORAL 2 TIMES DAILY
Qty: 14 TABLET | Refills: 0 | Status: SHIPPED | OUTPATIENT
Start: 2017-02-14 | End: 2017-02-21

## 2017-02-14 NOTE — PROGRESS NOTES
Please check adherence to KPN his Phos is a bit lower  Encourage dairy  Products  Will repeat labs next weeks

## 2017-02-19 DIAGNOSIS — Z94.0 KIDNEY REPLACED BY TRANSPLANT: Primary | ICD-10-CM

## 2017-02-19 DIAGNOSIS — D72.829 LEUKOCYTOSIS, UNSPECIFIED TYPE: ICD-10-CM

## 2017-02-19 DIAGNOSIS — N39.0 URINARY TRACT INFECTION WITHOUT HEMATURIA, SITE UNSPECIFIED: ICD-10-CM

## 2017-02-19 DIAGNOSIS — Z57.8 EMPLOYEE EXPOSURE TO BLOOD: ICD-10-CM

## 2017-02-19 DIAGNOSIS — R74.8 ELEVATED LIVER ENZYMES: ICD-10-CM

## 2017-02-19 DIAGNOSIS — Z72.89 OTHER PROBLEMS RELATED TO LIFESTYLE: ICD-10-CM

## 2017-02-19 SDOH — SOCIAL DETERMINANTS OF HEALTH (SDOH): OCCUPATIONAL EXPOSURE TO OTHER RISK FACTORS: Z57.8

## 2017-02-20 ENCOUNTER — OFFICE VISIT (OUTPATIENT)
Dept: TRANSPLANT | Facility: CLINIC | Age: 67
End: 2017-02-20
Payer: MEDICARE

## 2017-02-20 ENCOUNTER — LAB VISIT (OUTPATIENT)
Dept: LAB | Facility: HOSPITAL | Age: 67
End: 2017-02-20
Attending: INTERNAL MEDICINE
Payer: MEDICARE

## 2017-02-20 VITALS
SYSTOLIC BLOOD PRESSURE: 118 MMHG | WEIGHT: 140.44 LBS | RESPIRATION RATE: 18 BRPM | BODY MASS INDEX: 19.66 KG/M2 | DIASTOLIC BLOOD PRESSURE: 77 MMHG | OXYGEN SATURATION: 100 % | HEIGHT: 71 IN | HEART RATE: 70 BPM | TEMPERATURE: 98 F

## 2017-02-20 DIAGNOSIS — Z94.0 DECEASED-DONOR KIDNEY TRANSPLANT: Primary | ICD-10-CM

## 2017-02-20 DIAGNOSIS — Q60.2 CONGENITAL ABSENCE OF KIDNEY: ICD-10-CM

## 2017-02-20 DIAGNOSIS — E87.6 HYPOKALEMIA: ICD-10-CM

## 2017-02-20 DIAGNOSIS — E87.20 METABOLIC ACIDOSIS: ICD-10-CM

## 2017-02-20 DIAGNOSIS — Z94.0 KIDNEY REPLACED BY TRANSPLANT: Primary | ICD-10-CM

## 2017-02-20 DIAGNOSIS — Z94.0 S/P KIDNEY TRANSPLANT: ICD-10-CM

## 2017-02-20 DIAGNOSIS — T86.11 ACUTE REJECTION OF KIDNEY TRANSPLANT: ICD-10-CM

## 2017-02-20 DIAGNOSIS — Z57.8 EMPLOYEE EXPOSURE TO BLOOD: ICD-10-CM

## 2017-02-20 DIAGNOSIS — D63.8 ANEMIA OF CHRONIC DISEASE: ICD-10-CM

## 2017-02-20 DIAGNOSIS — E83.39 HYPOPHOSPHATEMIA: ICD-10-CM

## 2017-02-20 DIAGNOSIS — K52.9 CHRONIC DIARRHEA: ICD-10-CM

## 2017-02-20 DIAGNOSIS — N18.2 CKD (CHRONIC KIDNEY DISEASE) STAGE 2, GFR 60-89 ML/MIN: Chronic | ICD-10-CM

## 2017-02-20 DIAGNOSIS — Z94.0 KIDNEY REPLACED BY TRANSPLANT: ICD-10-CM

## 2017-02-20 DIAGNOSIS — Z79.60 LONG-TERM USE OF IMMUNOSUPPRESSANT MEDICATION: ICD-10-CM

## 2017-02-20 DIAGNOSIS — Z72.89 OTHER PROBLEMS RELATED TO LIFESTYLE: ICD-10-CM

## 2017-02-20 DIAGNOSIS — R74.8 ELEVATED LIVER ENZYMES: ICD-10-CM

## 2017-02-20 DIAGNOSIS — N25.81 SECONDARY HYPERPARATHYROIDISM, RENAL: ICD-10-CM

## 2017-02-20 DIAGNOSIS — Z29.89 PROPHYLACTIC IMMUNOTHERAPY: ICD-10-CM

## 2017-02-20 LAB
ALBUMIN SERPL BCP-MCNC: 3.5 G/DL
ALBUMIN SERPL BCP-MCNC: 3.5 G/DL
ALP SERPL-CCNC: 70 U/L
ALT SERPL W/O P-5'-P-CCNC: 57 U/L
ANION GAP SERPL CALC-SCNC: 7 MMOL/L
ANISOCYTOSIS BLD QL SMEAR: SLIGHT
AST SERPL-CCNC: 37 U/L
BASOPHILS # BLD AUTO: ABNORMAL K/UL
BASOPHILS NFR BLD: 0 %
BILIRUB DIRECT SERPL-MCNC: 0.2 MG/DL
BILIRUB SERPL-MCNC: 0.5 MG/DL
BUN SERPL-MCNC: 20 MG/DL
CALCIUM SERPL-MCNC: 8.8 MG/DL
CHLORIDE SERPL-SCNC: 110 MMOL/L
CHOLEST/HDLC SERPL: 2.7 {RATIO}
CO2 SERPL-SCNC: 23 MMOL/L
CREAT SERPL-MCNC: 1.2 MG/DL
DIFFERENTIAL METHOD: ABNORMAL
EOSINOPHIL # BLD AUTO: ABNORMAL K/UL
EOSINOPHIL NFR BLD: 2 %
ERYTHROCYTE [DISTWIDTH] IN BLOOD BY AUTOMATED COUNT: 18.3 %
EST. GFR  (AFRICAN AMERICAN): >60 ML/MIN/1.73 M^2
EST. GFR  (NON AFRICAN AMERICAN): >60 ML/MIN/1.73 M^2
GLUCOSE SERPL-MCNC: 84 MG/DL
HBV SURFACE AG SERPL QL IA: NEGATIVE
HCT VFR BLD AUTO: 35.1 %
HDL/CHOLESTEROL RATIO: 37.2 %
HDLC SERPL-MCNC: 156 MG/DL
HDLC SERPL-MCNC: 58 MG/DL
HGB BLD-MCNC: 11.6 G/DL
HIV 1+2 AB+HIV1 P24 AG SERPL QL IA: NEGATIVE
HYPOCHROMIA BLD QL SMEAR: ABNORMAL
LDLC SERPL CALC-MCNC: 71.8 MG/DL
LYMPHOCYTES # BLD AUTO: ABNORMAL K/UL
LYMPHOCYTES NFR BLD: 1 %
MAGNESIUM SERPL-MCNC: 1.7 MG/DL
MCH RBC QN AUTO: 32 PG
MCHC RBC AUTO-ENTMCNC: 33 %
MCV RBC AUTO: 97 FL
METAMYELOCYTES NFR BLD MANUAL: 1 %
MONOCYTES # BLD AUTO: ABNORMAL K/UL
MONOCYTES NFR BLD: 6 %
NEUTROPHILS NFR BLD: 85 %
NEUTS BAND NFR BLD MANUAL: 5 %
NONHDLC SERPL-MCNC: 98 MG/DL
OVALOCYTES BLD QL SMEAR: ABNORMAL
PHOSPHATE SERPL-MCNC: 2.7 MG/DL
PLATELET # BLD AUTO: 111 K/UL
PLATELET BLD QL SMEAR: ABNORMAL
PMV BLD AUTO: 11.2 FL
POIKILOCYTOSIS BLD QL SMEAR: SLIGHT
POTASSIUM SERPL-SCNC: 3.8 MMOL/L
PROT SERPL-MCNC: 6.3 G/DL
RBC # BLD AUTO: 3.62 M/UL
SCHISTOCYTES BLD QL SMEAR: ABNORMAL
SODIUM SERPL-SCNC: 140 MMOL/L
TRIGL SERPL-MCNC: 131 MG/DL
WBC # BLD AUTO: 4.99 K/UL

## 2017-02-20 PROCEDURE — 99214 OFFICE O/P EST MOD 30 MIN: CPT | Mod: PBBFAC | Performed by: NURSE PRACTITIONER

## 2017-02-20 PROCEDURE — 99215 OFFICE O/P EST HI 40 MIN: CPT | Mod: S$PBB,,, | Performed by: NURSE PRACTITIONER

## 2017-02-20 PROCEDURE — 99999 PR PBB SHADOW E&M-EST. PATIENT-LVL IV: CPT | Mod: PBBFAC,,, | Performed by: NURSE PRACTITIONER

## 2017-02-20 RX ORDER — VALGANCICLOVIR 450 MG/1
450 TABLET, FILM COATED ORAL DAILY
Qty: 30 TABLET | Refills: 1 | Status: SHIPPED | OUTPATIENT
Start: 2017-02-20 | End: 2017-05-22 | Stop reason: ALTCHOICE

## 2017-02-20 SDOH — SOCIAL DETERMINANTS OF HEALTH (SDOH): OCCUPATIONAL EXPOSURE TO OTHER RISK FACTORS: Z57.8

## 2017-02-20 NOTE — PROGRESS NOTES
Kidney Post-Transplant Assessment    Referring Physician: Lake Merchant  Current Nephrologist:     ORGAN: LEFT KIDNEY  Donor Type:  - brain death  PHS Increased Risk: yes  Cold Ischemia: 854 mins  Induction Medications: campath - alemtuzumab (anti-cd52)    Subjective:     CC:  Reassessment of renal allograft function and management of chronic immunosuppression.    HPI:  Mr. Burkett is a 67 y.o. year old Black or  male who received a  - brain death kidney transplant on 16. Mr. Burkett had  ESRD HD due to HTN  and congenital absent left kidney who was on dialysis since 6/16/10 until receiving PHS increased risk DDRT (pumped kidney, Campath induction, KDPI 36%, WIT 30 minutes, CIT 14 hours and 14 minutes, donor RPR positive thus patient completed PCN x3, CMV D+/R+) on 16.    His most recent creatinine is 1.3. He takes mycophenolate mofetil, prednisone and tacrolimus  For Immunosuppression. He is on Bactrim X 1 year and Valcyte X 3 months.      His post transplant course has been complicated by acute rejection.  Post Transplant Course: surgery had no complication. Pt had DIVINE with suspected rejection on . Pt did receive 1 dose of SMP after labs drawn. Kid bx  revealed AVR and he was d/c'd home while awaiting results.  He was readmitted  to begin tx with thymo x 5-7 doses. DSA  neg.  He tolerated Thymo dose #1 w/o comp.  Dose #2 held due to fever 100.5.  No further fever.  Blood cx w NGTD.  U cx resulted (+) enterococcus >100,000.  He will be treated w Zyvox fro total of 10 days. He received Thymo dose #2 on ; Thymo HALF dose #3; Thymo HALF dose #4 and also received Neupogen 480 mcg SC 17; and Thymo full dose #5 on 1/15. Creatinine trended to 1.2. He was rebiopsied on 17 which showed acute tubular injury and vacuolization, with + myoglobulin and negative heme.    Started on Cipro 500 mg BID for UTI  On 2017    Patient logs were left home  The  patient reports the following information:   BP stable --states it runs in the 120s/70s at home   Weight --> had lost some weight but has started drinking boost for extra calories, and feels he has started to put on weight.   2017--> weight 135 lb 5.8 oz , today -->  weight 140 lb 6.9 oz  I/Os--> urinating a lot --has not measured output  Is drinking 2-3l/ of water /day    Overall feels well. No health concerns today.   Denies chest pain, SOB, leg pain, abdominal pain or LUTs.    Past Medical History   Diagnosis Date    Acidosis     Adrenal adenoma     Anemia associated with chronic renal failure     Arrhythmia, onset 2015    Awaiting organ transplant status 2013    Basal cell carcinoma 2012     left nasal tip    Blood type B+ 2013    Cancer     Celiac artery dissection     Chronic diarrhea     Chronic urethral stricture     Congenital absence of kidney      left    -donor kidney transplant 16      Induced w Campath 30 mg IV intraoperatively & SoluMedrol 875 mg total over 3 days.  Renal allograft biopsy 17 (DIVINE): 21 glomeruli, none globally sclerosed, <5% interstitial fibrosis, no ACR, c4d negative, AVR CCT Type 2 (V1 lesion); plan THYMO     Dissecting aortic aneurysm (any part), abdominal     Encounter for blood transfusion     ESRD (end stage renal disease) 2010    H/O: urethral stricture     History of AAA (abdominal aortic aneurysm) repair     Hypertension     Hypokalemia     Hypothyroidism 1/10/2014    Inguinal hernia bilateral, non-recurrent     Kidney stones     Organ transplant candidate 2013    Plantar warts 1/10/2014    S/P kidney transplant     Secondary hyperparathyroidism, renal     Thyroid disease        Review of Systems   Constitutional: Negative for activity change, appetite change, chills, fatigue, fever and unexpected weight change.   HENT: Negative for congestion, facial swelling, postnasal drip, rhinorrhea,  "sinus pressure, sore throat and trouble swallowing.    Eyes: Positive for visual disturbance. Negative for pain and redness.        Wears glasses   Respiratory: Negative for cough, chest tightness, shortness of breath and wheezing.    Cardiovascular: Negative.  Negative for chest pain, palpitations and leg swelling.   Gastrointestinal: Negative for abdominal pain, diarrhea, nausea and vomiting.   Genitourinary: Negative for dysuria, flank pain and urgency.        Has noticed a low sex drive since starting dialysis    Musculoskeletal: Positive for back pain. Negative for gait problem, neck pain and neck stiffness.        Gets lower back pain at times. Last occurred 2 weeks ago  States percocet helps    Skin: Negative for rash.   Allergic/Immunologic: Positive for immunocompromised state. Negative for environmental allergies and food allergies.   Neurological: Negative for dizziness, weakness, light-headedness and headaches.   Psychiatric/Behavioral: Negative for agitation and confusion. The patient is not nervous/anxious.        Objective:   Blood pressure 118/77, pulse 70, temperature 97.8 °F (36.6 °C), temperature source Oral, resp. rate 18, height 5' 11" (1.803 m), weight 63.7 kg (140 lb 6.9 oz), SpO2 100 %.body mass index is 19.59 kg/(m^2).    Physical Exam   Constitutional: He is oriented to person, place, and time. He appears well-developed and well-nourished.   HENT:   Head: Normocephalic.   Mouth/Throat: Oropharynx is clear and moist. No oropharyngeal exudate.   Eyes: Conjunctivae and EOM are normal. Pupils are equal, round, and reactive to light. No scleral icterus.   Neck: Normal range of motion. Neck supple.   Cardiovascular: Normal rate and regular rhythm.    Murmur heard.   Systolic murmur is present with a grade of 1/6   Pulmonary/Chest: Effort normal and breath sounds normal. No respiratory distress. He has no wheezes. He has no rales.   Abdominal: Soft. Normal appearance and bowel sounds are normal. " He exhibits no distension and no mass. There is no splenomegaly or hepatomegaly. There is no tenderness. There is no rebound, no guarding, no CVA tenderness, no tenderness at McBurney's point and negative Blanchard's sign.       Musculoskeletal: Normal range of motion. He exhibits no edema.        Arms:  Lymphadenopathy:     He has no cervical adenopathy.   Neurological: He is alert and oriented to person, place, and time. He exhibits normal muscle tone. Coordination normal.   Skin: Skin is warm and dry.   Psychiatric: He has a normal mood and affect. His behavior is normal.   Vitals reviewed.      Labs:  Lab Results   Component Value Date    WBC 5.16 2017    HGB 10.9 (L) 2017    HCT 33.4 (L) 2017     2017    K 3.3 (L) 2017     (H) 2017    CO2 21 (L) 2017    BUN 26 (H) 2017    CREATININE 1.3 2017    EGFRNONAA 56.9 (A) 2017    CALCIUM 8.6 (L) 2017    PHOS 2.3 (L) 2017    MG 1.7 2017    ALBUMIN 3.3 (L) 2017    AST 14 2017    ALT 61 (H) 2017    UTPCR 0.10 2017    .0 (H) 2016    TACROLIMUS 8.1 2017    CYCLOSPORINE <10 (L) 01/10/2017       No results found for: EXTANC, EXTWBC, EXTSEGS, EXTPLATELETS, EXTHEMOGLOBI, EXTHEMATOCRI, EXTCREATININ, EXTSODIUM, EXTPOTASSIUM, EXTBUN, EXTCO2, EXTCALCIUM, EXTPHOSPHORU, EXTGLUCOSE, EXTALBUMIN, EXTAST, EXTALT, EXTBILITOTAL, EXTLIPASE, EXTAMYLASE    No results found for: EXTCYCLOSLVL, EXTSIROLIMUS, EXTTACROLVL, EXTPROTCRE, EXTPTHINTACT, EXTPROTEINUA, EXTWBCUA, EXTRBCUA    Labs were reviewed with the patient    Assessment:     1. -donor kidney transplant 16    2. S/P kidney transplant    3. Prophylactic immunotherapy (transplant immunosuppression)    4. Long-term use of immunosuppressant medication    5. Acute rejection of kidney transplant    6. Congenital absence of kidney    7. Hypophosphatemia    8. Anemia of chronic disease    9. Chronic  diarrhea    10. CKD (chronic kidney disease) stage 2, GFR 60-89 ml/min    11. Metabolic acidosis    12. Secondary hyperparathyroidism, renal    13. Hypokalemia        Plan:    Continue Cipro as prescribed     Follow-up:   1. CKD stage: 2 stable    2. Immunosuppression:   Prograf Trough 8.1, which is therapeutic--> target 8-10. Continue Prograf 6/6,  mg BID , and prednisone taper as prescribed  Continue Valcyte 450 mg QD for CMV prophylaxis  and Bactrim 400/80 mg for PCP prophylaxis QD as prescribed    Will continue to monitor for drug toxicities.     3. Allograft Function: Stable.  Continue good po hydration .    Lab Results   Component Value Date    CREATININE 1.3 02/13/2017     eGFR if African American >60 mL/min/1.73 m^2 >60.0                  02/13/2017       4. Hypertension management: controlled, advise low salt diet and home BP monitoring   Continue Lopressor 12.5 mg BID as prescribed     5. Metabolic Bone Disease/Secondary Hyperparathyroidism:stable.    Will monitor Vit D, PTH and CA / protocol   Lab Results   Component Value Date    CALCIUM 8.6 (L) 02/13/2017    PHOS 2.3 (L) 02/13/2017   Continue k phos 1000 mg TID as prescribed, High phos diet  and add some High K+ foods to diet    6. Electrolytes:  Bicarbonate is low, Continue sodium bicarb 1950 mg BID and MG ox 400 mg QD as prescribed  Lab Results   Component Value Date     02/13/2017    K 3.3 (L) 02/13/2017     (H) 02/13/2017    CO2 21 (L) 02/13/2017     MG 1.7 02/13/2017     7. Anemia: stable. No need for intervention    Lab Results   Component Value Date    WBC 4.99 02/20/2017    HGB 11.6 (L) 02/20/2017    HCT 35.1 (L) 02/20/2017    MCV 97 02/20/2017     (L) 02/20/2017       8.  Cytopenias: no significant cytopenias will monitor as per our guidelines. Medicine list reviewed including potential causes of drug-induced cytopenias    9.Proteinuria: continue p/c ratio as per guidelines    UTPCR 0.10 01/20/2017     10. BK virus  infection screening:   will continue to monitor/ guidelines  Results pending    11. Weight education: provided during the clinic visit   Body mass index is 19.59 kg/(m^2).          12.Patient safety education regarding immunosuppression including prophylaxis posttransplant for CMV, PCP : Education provided about vaccination and prevention of infections       Follow-up:   Clinic: return to transplant clinic weekly for the first month after transplant; every 2 weeks during months 2-3; then at 6-, 9-, 12-, 18-, 24-, and 36- months post-transplant to reassess for complications from immunosuppression toxicity and monitor for rejection.  Annually thereafter.    Labs: since patient remains at high risk for rejection and drug-related complications that warrant close monitoring, labs will be ordered as follows: continue twice weekly CBC, renal panel, and drug level for first month; then same labs once weekly through 3rd month post-transplant.  Urine for UA and protein/creatinine ratio monthly.  Urine BK - PCR at 1-, 3-, 6-, 9-, 12-, 18-, 24-, and 36- months post-transplant.  Hepatic panel at 1-, 2-, 3-, 6-, 9-, 12-, 18-, 24-, and 36- months post-transplant.    Yuni Rodney NP       Education:   Material provided to the patient.  Patient reminded to call with any health changes since these can be early signs of significant complications.  Also, I advised the patient to be sure any new medications or changes of old medications are discussed with either a pharmacist or physician knowledgeable with transplant to avoid rejection/drug toxicity related to significant drug interactions.

## 2017-02-20 NOTE — LETTER
February 20, 2017                     [address] [facility]  [address]  [facility] [address]  Phone: [phone]   Patient: Alin Burkett   MR Number: [id number]   YOB: 1950   Date of Visit: 2/20/2017       Dear      Thank you for referring Alin Burkett to me for evaluation. Attached you will find relevant portions of my assessment and plan of care.    If you have questions, please do not hesitate to call me. I look forward to following Alin Burkett along with you.    Sincerely,    Yuni Rodney, NP    Enclosure    If you would like to receive this communication electronically, please contact [email or web address] or [phone] to request [employer] access.    [facility] Link is a tool which provides read-only access to select patient information with whom you have a relationship. Its easy to use and provides real time access to review your patients record including encounter summaries, notes, results, and demographic information.    If you feel you have received this communication in error or would no longer like to receive these types of communications, please e-mail [email or web address]

## 2017-02-20 NOTE — MR AVS SNAPSHOT
[facility]  [address]  [place] [address]  Phone: [phone]                  Alin Burkett   2017 2:00 PM   Office Visit    Description:  Male : 1950   Provider:  Yuni Rodney NP   Department:  [facility]           Reason for Visit     Kidney Transplant Evaluation           Diagnoses this Visit        Comments    -donor kidney transplant    -  Primary     S/P kidney transplant         Prophylactic immunotherapy         Long-term use of immunosuppressant medication         Acute rejection of kidney transplant         Congenital absence of kidney         Hypophosphatemia         Anemia of chronic disease         Chronic diarrhea         CKD (chronic kidney disease) stage 2, GFR 60-89 ml/min                To Do List           Future Appointments        Provider Department Dept Phone    2017 8:45 AM LAB, [facility] - [facility] [phone]    2017 9:00 AM SPECIMEN, [facility] - [facility] [phone]    3/6/2017 8:30 AM LAB, [facility] - [facility] [phone]    2017 8:15 AM LAB, [facility] [phone]    2017 8:15 AM LAB, [facility] [phone]      Goals (5 Years of Data)     None      Follow-Up and Disposition     Return in about 3 months (around 2017).       These Medications        Disp Refills Start End    valganciclovir (VALCYTE) 450 mg Tab 30 tablet 1 2017 4/15/2017    Take 1 tablet (450 mg total) by mouth once daily. STOP 4/15/17 - Oral    Pharmacy: [facility] [address] [place] [address] #: [phone]         [facility]     [facility] Care [facility] -  Assistance  Registered nurses in the [facility] provide clinical advisement, health education, appointment booking, and other advisory services.  Call for this free service at [phone].             Medications            Message regarding Medications     Verify the changes and/or additions to your medication regime listed below are the same as discussed with your clinician today.  If any of these changes or additions are incorrect, please notify your healthcare provider.        START taking these NEW medications        Refills    valganciclovir (VALCYTE) 450 mg Tab 1    Sig: Take 1 tablet (450 mg total) by mouth once daily. STOP 4/15/17    Class: Normal    Route: Oral      STOP taking these medications     oxycodone-acetaminophen (PERCOCET) 5-325 mg per tablet Take 1 tablet by mouth every 6 (six) hours as needed.    nystatin (MYCOSTATIN) 100,000 unit/mL suspension Take 5 mLs (500,000 Units total) by mouth 4 (four) times daily with meals and nightly. Stop: 2/13/17    loperamide capsule 2 mg            Verify that the below list of medications is an accurate representation of the medications you are currently taking.  If none reported, the list may be blank. If incorrect, please contact your healthcare provider. Carry this list with you in case of emergency.           Current Medications     ciprofloxacin HCl (CIPRO) 500 MG tablet Take 1 tablet (500 mg total) by mouth 2 (two) times daily.    famotidine (PEPCID) 20 MG tablet Take 1 tablet (20 mg total) by mouth every evening.    k phos di & mono-sod phos mono (K-PHOS-NEUTRAL) 250 mg Tab Take 4 tablets by mouth 3 (three) times daily.    ketoconazole (NIZORAL) 200 mg Tab Take 0.5 tablets (100 mg total) by mouth once daily.    levothyroxine (SYNTHROID) 112 MCG tablet Take 1 tablet (112 mcg total) by mouth once daily.    magnesium oxide (MAG-OX) 400 mg tablet Take 1 tablet (400 mg total) by mouth once daily.    metoprolol tartrate (LOPRESSOR) 25 MG tablet Take 0.5 tablets (12.5 mg total) by mouth 2 (two) times daily.    multivitamin (ONE DAILY MULTIVITAMIN) per tablet Take 1 tablet by mouth once daily.    mycophenolate (CELLCEPT) 250 mg Cap Take 3 capsules (750 mg total) by mouth  "2 (two) times daily. Z94.0/Kidney Transplant on 11/26/16    predniSONE (DELTASONE) 10 MG tablet Take 20 mg PO QD 1/13-2/12; 15 mg PO QD 2/13-3/12; 10 mg PO QD 3/13-4/12; then 5 mg PO QD thereafter    sodium bicarbonate 650 MG tablet Take 3 tablets (1,950 mg total) by mouth 2 (two) times daily.    sulfamethoxazole-trimethoprim 400-80mg (BACTRIM,SEPTRA) 400-80 mg per tablet Take 1 tablet by mouth once daily. Stop: 11/26/17    tacrolimus (PROGRAF) 1 MG Cap Take 6 capsules (6 mg total) by mouth every 12 (twelve) hours. Z94.0/Kidney Transplant on 11/26/16    valganciclovir (VALCYTE) 450 mg Tab Take 1 tablet (450 mg total) by mouth once daily. STOP 4/15/17           Clinical Reference Information           Your Vitals Were     BP Pulse Temp Resp Height Weight    118/77 (BP Location: Right arm, Patient Position: Sitting, BP Method: Automatic) 70 97.8 °F (36.6 °C) (Oral) 18 5' 11" (1.803 m) 63.7 kg (140 lb 6.9 oz)    SpO2 BMI             100% 19.59 kg/m2         Blood Pressure          Most Recent Value    BP  118/77      Allergies as of 2/20/2017     No Known Allergies      Immunizations Administered on Date of Encounter - 2/20/2017     None      Maintenance Dialysis History     Start End Type Comments Center    6/16/2010  [place]  [facility]            Current Dialysis Center Information     [facility] [address] [facility] Phone #:  [phone]    Contact:  [facility]  [address] Fax #:  [phone]            Transplant Information        Txp Date Organ Coordinator Care Team    11/26/2016 Kidney Lisa Olea RN Referring Physician:  Lake Merchant MD   Surgeon:  Andrew Lopez Jr., MD         Language Assistance Services     ATTENTION: Language assistance services are available, free of charge. Please call [phone].      ATENCIÓN: Si habla español, tiene a neri disposición servicios gratuitos de asistencia lingüística. Llame al [phone].     ELAYNE Ý: N?u b?n nói Ti?ng Vi?t, có các d?ch v? " h? tr? toby ng? mi?n phí dành cho b?n. G?i s? [phone].         [facility] complies with applicable Federal civil rights laws and does not discriminate on the basis of race, color, national origin, age, disability, or sex.

## 2017-02-21 LAB
CK MB SERPL-MCNC: 8.6 NG/ML
CK MB SERPL-RTO: 5.1 %
CK SERPL-CCNC: 168 U/L
MYOGLOBIN SERPL-MCNC: 66 MCG/L
TACROLIMUS BLD-MCNC: 9.9 NG/ML

## 2017-02-22 LAB
BK VIRUS DNA PCR, QUANT, BLOOD: <250 COPIES/ML
BK VIRUS DNA, BLOOD: NOT DETECTED
HCV LOG: <1.08 LOG (10) IU/ML
HCV RNA QUANT PCR: <12 IU/ML
HCV, QUALITATIVE: NOT DETECTED IU/ML
LOG BKV COPIES/ML: <2.4 LOG (10) COPIES/ML

## 2017-02-23 LAB
HEPATITIS B VIRAL DNA - QUANTITATIVE: <10 IU/ML
HEPATITIS B VIRUS DNA: NOT DETECTED
LOG HBV IU/ML: <1 LOG (10) IU/ML

## 2017-02-27 ENCOUNTER — LAB VISIT (OUTPATIENT)
Dept: LAB | Facility: HOSPITAL | Age: 67
End: 2017-02-27
Attending: INTERNAL MEDICINE
Payer: MEDICARE

## 2017-02-27 DIAGNOSIS — Z94.0 KIDNEY REPLACED BY TRANSPLANT: ICD-10-CM

## 2017-02-27 LAB
ALBUMIN SERPL BCP-MCNC: 3.4 G/DL
ANION GAP SERPL CALC-SCNC: 6 MMOL/L
ANISOCYTOSIS BLD QL SMEAR: SLIGHT
BASOPHILS # BLD AUTO: ABNORMAL K/UL
BASOPHILS NFR BLD: 3 %
BUN SERPL-MCNC: 23 MG/DL
CALCIUM SERPL-MCNC: 8.3 MG/DL
CHLORIDE SERPL-SCNC: 110 MMOL/L
CK SERPL-CCNC: 155 U/L
CO2 SERPL-SCNC: 24 MMOL/L
CREAT SERPL-MCNC: 1.2 MG/DL
DIFFERENTIAL METHOD: ABNORMAL
EOSINOPHIL # BLD AUTO: ABNORMAL K/UL
EOSINOPHIL NFR BLD: 4 %
ERYTHROCYTE [DISTWIDTH] IN BLOOD BY AUTOMATED COUNT: 17.8 %
EST. GFR  (AFRICAN AMERICAN): >60 ML/MIN/1.73 M^2
EST. GFR  (NON AFRICAN AMERICAN): >60 ML/MIN/1.73 M^2
GLUCOSE SERPL-MCNC: 81 MG/DL
HCT VFR BLD AUTO: 36.2 %
HGB BLD-MCNC: 11.8 G/DL
LYMPHOCYTES # BLD AUTO: ABNORMAL K/UL
LYMPHOCYTES NFR BLD: 4 %
MAGNESIUM SERPL-MCNC: 1.7 MG/DL
MCH RBC QN AUTO: 32.7 PG
MCHC RBC AUTO-ENTMCNC: 32.6 %
MCV RBC AUTO: 100 FL
MONOCYTES # BLD AUTO: ABNORMAL K/UL
MONOCYTES NFR BLD: 4 %
NEUTROPHILS NFR BLD: 77 %
NEUTS BAND NFR BLD MANUAL: 8 %
OVALOCYTES BLD QL SMEAR: ABNORMAL
PHOSPHATE SERPL-MCNC: 2.1 MG/DL
PLATELET # BLD AUTO: 121 K/UL
PMV BLD AUTO: 11.2 FL
POIKILOCYTOSIS BLD QL SMEAR: SLIGHT
POLYCHROMASIA BLD QL SMEAR: ABNORMAL
POTASSIUM SERPL-SCNC: 3.2 MMOL/L
RBC # BLD AUTO: 3.61 M/UL
SODIUM SERPL-SCNC: 140 MMOL/L
WBC # BLD AUTO: 4.54 K/UL

## 2017-02-27 PROCEDURE — 36415 COLL VENOUS BLD VENIPUNCTURE: CPT | Mod: PO

## 2017-02-27 PROCEDURE — 85007 BL SMEAR W/DIFF WBC COUNT: CPT

## 2017-02-27 PROCEDURE — 80069 RENAL FUNCTION PANEL: CPT

## 2017-02-27 PROCEDURE — 82550 ASSAY OF CK (CPK): CPT

## 2017-02-27 PROCEDURE — 80197 ASSAY OF TACROLIMUS: CPT

## 2017-02-27 PROCEDURE — 83735 ASSAY OF MAGNESIUM: CPT

## 2017-02-27 PROCEDURE — 85027 COMPLETE CBC AUTOMATED: CPT

## 2017-02-27 NOTE — PROGRESS NOTES
Potassium, calcium, and phos all low --> have dietician contact patient.  on higher potassium diet. Last vitamin D on lower end --> start cholecalciferol 1000 units daily. If he is compliant with K-phos 1000 mg TID then increase to 1000 mg QID. Repeat labs later this week

## 2017-02-27 NOTE — TELEPHONE ENCOUNTER
Call placed, labs reviewed by Dr. Gardner. KPN dose increased to 1000mg QID. Will start Cholecalciferol 1000mg/d

## 2017-02-28 LAB — TACROLIMUS BLD-MCNC: 9.6 NG/ML

## 2017-02-28 NOTE — PROGRESS NOTES
Results reviewed and the following message sent to patient via [facility]: Please decrease Prograf/tacrolimus to 5 mg twice daily.

## 2017-03-01 RX ORDER — VIT C/E/ZN/COPPR/LUTEIN/ZEAXAN 250MG-90MG
1000 CAPSULE ORAL DAILY
Qty: 30 CAPSULE | Refills: 11 | Status: SHIPPED | OUTPATIENT
Start: 2017-03-01 | End: 2017-06-26

## 2017-03-02 NOTE — TELEPHONE ENCOUNTER
----- Message from Jovanna Omalley MD sent at 2/28/2017  5:34 PM CST -----  Results reviewed and the following message sent to patient via [facility]: Please decrease Prograf/tacrolimus to 5 mg twice daily.

## 2017-03-02 NOTE — TELEPHONE ENCOUNTER
Notified patient of Dr. Pulido's review of 2/27/17 lab results. Instructed patient to decrease Prograf to 5mg twice daily; next labs 3/6/17.  Patient verbalized understanding.

## 2017-03-03 ENCOUNTER — TELEPHONE (OUTPATIENT)
Dept: TRANSPLANT | Facility: CLINIC | Age: 67
End: 2017-03-03

## 2017-03-03 RX ORDER — TACROLIMUS 1 MG/1
5 CAPSULE ORAL EVERY 12 HOURS
Qty: 300 CAPSULE | Refills: 11 | Status: SHIPPED | OUTPATIENT
Start: 2017-03-03 | End: 2017-03-09 | Stop reason: DRUGHIGH

## 2017-03-03 NOTE — TELEPHONE ENCOUNTER
Called pt per nurse request to speak to him about his diet relating to low phos and potassium levels.   K 3.2, Phos 2.1    Pt reports having a very good appetite, no n/v, he does have consistent diarrhea after eating. He states it does not matter what he eats or drinks, he experiences diarrhea afterwards.   He drinks Ensure/Boost when available at home, not daily. He is eating high phos and potassium rich foods daily, at each meal.   Pt will accept nutrition info mailed to his home.   RD contact info provided.

## 2017-03-06 ENCOUNTER — LAB VISIT (OUTPATIENT)
Dept: LAB | Facility: HOSPITAL | Age: 67
End: 2017-03-06
Attending: INTERNAL MEDICINE
Payer: MEDICARE

## 2017-03-06 DIAGNOSIS — Z94.0 KIDNEY REPLACED BY TRANSPLANT: ICD-10-CM

## 2017-03-06 LAB
ALBUMIN SERPL BCP-MCNC: 3.6 G/DL
ANION GAP SERPL CALC-SCNC: 7 MMOL/L
BASOPHILS # BLD AUTO: 0.01 K/UL
BASOPHILS NFR BLD: 0.2 %
BUN SERPL-MCNC: 19 MG/DL
CALCIUM SERPL-MCNC: 8.7 MG/DL
CHLORIDE SERPL-SCNC: 111 MMOL/L
CO2 SERPL-SCNC: 23 MMOL/L
CREAT SERPL-MCNC: 1.2 MG/DL
DIFFERENTIAL METHOD: ABNORMAL
EOSINOPHIL # BLD AUTO: 0.1 K/UL
EOSINOPHIL NFR BLD: 0.9 %
ERYTHROCYTE [DISTWIDTH] IN BLOOD BY AUTOMATED COUNT: 16.7 %
EST. GFR  (AFRICAN AMERICAN): >60 ML/MIN/1.73 M^2
EST. GFR  (NON AFRICAN AMERICAN): >60 ML/MIN/1.73 M^2
GLUCOSE SERPL-MCNC: 74 MG/DL
HCT VFR BLD AUTO: 36.9 %
HGB BLD-MCNC: 11.9 G/DL
LYMPHOCYTES # BLD AUTO: 0.4 K/UL
LYMPHOCYTES NFR BLD: 7.8 %
MAGNESIUM SERPL-MCNC: 1.7 MG/DL
MCH RBC QN AUTO: 32.2 PG
MCHC RBC AUTO-ENTMCNC: 32.2 %
MCV RBC AUTO: 100 FL
MONOCYTES # BLD AUTO: 0.2 K/UL
MONOCYTES NFR BLD: 2.9 %
NEUTROPHILS # BLD AUTO: 4.7 K/UL
NEUTROPHILS NFR BLD: 84.4 %
PHOSPHATE SERPL-MCNC: 2.4 MG/DL
PLATELET # BLD AUTO: 130 K/UL
PMV BLD AUTO: 11.6 FL
POTASSIUM SERPL-SCNC: 3.7 MMOL/L
RBC # BLD AUTO: 3.69 M/UL
SODIUM SERPL-SCNC: 141 MMOL/L
WBC # BLD AUTO: 5.52 K/UL

## 2017-03-06 PROCEDURE — 36415 COLL VENOUS BLD VENIPUNCTURE: CPT | Mod: PO

## 2017-03-06 PROCEDURE — 80069 RENAL FUNCTION PANEL: CPT

## 2017-03-06 PROCEDURE — 85025 COMPLETE CBC W/AUTO DIFF WBC: CPT

## 2017-03-06 PROCEDURE — 80197 ASSAY OF TACROLIMUS: CPT

## 2017-03-06 PROCEDURE — 83735 ASSAY OF MAGNESIUM: CPT

## 2017-03-08 LAB — TACROLIMUS BLD-MCNC: 12.5 NG/ML

## 2017-03-09 NOTE — TELEPHONE ENCOUNTER
Notified patient of Dr. Mcclain's review of 3/7/17 lab results. Instructed patient to decrease Prograf to 5mg in AM & 4mg in PM; repeat labs 3/15/17. Patient verbalized understanding.

## 2017-03-09 NOTE — TELEPHONE ENCOUNTER
----- Message from Chasidy Mcclain MD sent at 3/8/2017 10:25 AM CST -----  Please change the prograf dose to 5/4 if it is a true trough and check the level next week.

## 2017-03-10 RX ORDER — TACROLIMUS 1 MG/1
CAPSULE ORAL
Qty: 270 CAPSULE | Refills: 11 | Status: SHIPPED | OUTPATIENT
Start: 2017-03-10 | End: 2017-03-17 | Stop reason: DRUGHIGH

## 2017-03-15 ENCOUNTER — LAB VISIT (OUTPATIENT)
Dept: LAB | Facility: HOSPITAL | Age: 67
End: 2017-03-15
Attending: INTERNAL MEDICINE
Payer: MEDICARE

## 2017-03-15 DIAGNOSIS — Z94.0 KIDNEY REPLACED BY TRANSPLANT: ICD-10-CM

## 2017-03-15 LAB
ALBUMIN SERPL BCP-MCNC: 3.7 G/DL
ANION GAP SERPL CALC-SCNC: 8 MMOL/L
ANISOCYTOSIS BLD QL SMEAR: SLIGHT
BASOPHILS # BLD AUTO: ABNORMAL K/UL
BASOPHILS NFR BLD: 3 %
BUN SERPL-MCNC: 22 MG/DL
CALCIUM SERPL-MCNC: 9.3 MG/DL
CHLORIDE SERPL-SCNC: 109 MMOL/L
CO2 SERPL-SCNC: 24 MMOL/L
CREAT SERPL-MCNC: 1.2 MG/DL
DIFFERENTIAL METHOD: ABNORMAL
EOSINOPHIL # BLD AUTO: ABNORMAL K/UL
EOSINOPHIL NFR BLD: 2 %
ERYTHROCYTE [DISTWIDTH] IN BLOOD BY AUTOMATED COUNT: 15.4 %
EST. GFR  (AFRICAN AMERICAN): >60 ML/MIN/1.73 M^2
EST. GFR  (NON AFRICAN AMERICAN): >60 ML/MIN/1.73 M^2
GLUCOSE SERPL-MCNC: 74 MG/DL
HCT VFR BLD AUTO: 37.9 %
HGB BLD-MCNC: 13 G/DL
LYMPHOCYTES # BLD AUTO: ABNORMAL K/UL
LYMPHOCYTES NFR BLD: 5 %
MAGNESIUM SERPL-MCNC: 1.9 MG/DL
MCH RBC QN AUTO: 33.9 PG
MCHC RBC AUTO-ENTMCNC: 34.3 %
MCV RBC AUTO: 99 FL
MONOCYTES # BLD AUTO: ABNORMAL K/UL
MONOCYTES NFR BLD: 5 %
NEUTROPHILS NFR BLD: 83 %
NEUTS BAND NFR BLD MANUAL: 2 %
OVALOCYTES BLD QL SMEAR: ABNORMAL
PHOSPHATE SERPL-MCNC: 3.2 MG/DL
PLATELET # BLD AUTO: 106 K/UL
PLATELET BLD QL SMEAR: ABNORMAL
PMV BLD AUTO: 11 FL
POIKILOCYTOSIS BLD QL SMEAR: ABNORMAL
POTASSIUM SERPL-SCNC: 3.6 MMOL/L
RBC # BLD AUTO: 3.83 M/UL
SODIUM SERPL-SCNC: 141 MMOL/L
WBC # BLD AUTO: 5.52 K/UL

## 2017-03-15 PROCEDURE — 80197 ASSAY OF TACROLIMUS: CPT

## 2017-03-15 PROCEDURE — 85007 BL SMEAR W/DIFF WBC COUNT: CPT | Mod: PO

## 2017-03-15 PROCEDURE — 80069 RENAL FUNCTION PANEL: CPT

## 2017-03-15 PROCEDURE — 83735 ASSAY OF MAGNESIUM: CPT

## 2017-03-15 PROCEDURE — 36415 COLL VENOUS BLD VENIPUNCTURE: CPT | Mod: PO

## 2017-03-15 PROCEDURE — 85027 COMPLETE CBC AUTOMATED: CPT | Mod: PO

## 2017-03-16 LAB — TACROLIMUS BLD-MCNC: 6.8 NG/ML

## 2017-03-16 NOTE — PROGRESS NOTES
Prograf low fred since he had rejection in Jan 2017 --> if true 12 hour trough increase Prograf from 5/4 to 5 mg BID. Repeat labs in a week

## 2017-03-17 DIAGNOSIS — Z72.89 OTHER PROBLEMS RELATED TO LIFESTYLE: Primary | ICD-10-CM

## 2017-03-17 DIAGNOSIS — Z94.0 KIDNEY REPLACED BY TRANSPLANT: ICD-10-CM

## 2017-03-17 DIAGNOSIS — E83.39 HYPOPHOSPHATEMIA: ICD-10-CM

## 2017-03-17 RX ORDER — TACROLIMUS 1 MG/1
5 CAPSULE ORAL EVERY 12 HOURS
Qty: 300 CAPSULE | Refills: 11 | Status: SHIPPED | OUTPATIENT
Start: 2017-03-17 | End: 2017-06-07 | Stop reason: DRUGHIGH

## 2017-03-17 NOTE — TELEPHONE ENCOUNTER
Notified patient of Dr. Gardner's review of 3/15/17 lab results. Instructed patient to increase Prograf to 5/5; Next lab appointment is 3/27/17. Patient verbalized understanding.

## 2017-03-17 NOTE — TELEPHONE ENCOUNTER
----- Message from Deb Gardner MD sent at 3/16/2017 11:14 AM CDT -----  Prograf low fred since he had rejection in Jan 2017 --> if true 12 hour trough increase Prograf from 5/4 to 5 mg BID. Repeat labs in a week

## 2017-03-27 ENCOUNTER — LAB VISIT (OUTPATIENT)
Dept: LAB | Facility: HOSPITAL | Age: 67
End: 2017-03-27
Attending: INTERNAL MEDICINE
Payer: MEDICARE

## 2017-03-27 DIAGNOSIS — Z72.89 OTHER PROBLEMS RELATED TO LIFESTYLE: ICD-10-CM

## 2017-03-27 DIAGNOSIS — Z94.0 KIDNEY REPLACED BY TRANSPLANT: ICD-10-CM

## 2017-03-27 PROCEDURE — 80197 ASSAY OF TACROLIMUS: CPT

## 2017-03-27 PROCEDURE — 86592 SYPHILIS TEST NON-TREP QUAL: CPT

## 2017-03-27 PROCEDURE — 86780 TREPONEMA PALLIDUM: CPT

## 2017-03-27 PROCEDURE — 36415 COLL VENOUS BLD VENIPUNCTURE: CPT | Mod: PO

## 2017-03-28 LAB
RPR SER QL: NORMAL
TACROLIMUS BLD-MCNC: 9.7 NG/ML

## 2017-03-29 LAB — T PALLIDUM AB SER QL IF: NORMAL

## 2017-04-04 ENCOUNTER — LAB VISIT (OUTPATIENT)
Dept: LAB | Facility: HOSPITAL | Age: 67
End: 2017-04-04
Attending: INTERNAL MEDICINE
Payer: MEDICARE

## 2017-04-04 DIAGNOSIS — Z94.0 KIDNEY REPLACED BY TRANSPLANT: ICD-10-CM

## 2017-04-04 LAB
ALBUMIN SERPL BCP-MCNC: 3.4 G/DL
ANION GAP SERPL CALC-SCNC: 6 MMOL/L
ANISOCYTOSIS BLD QL SMEAR: SLIGHT
BASOPHILS # BLD AUTO: ABNORMAL K/UL
BASOPHILS NFR BLD: 1 %
BUN SERPL-MCNC: 19 MG/DL
CALCIUM SERPL-MCNC: 9.1 MG/DL
CHLORIDE SERPL-SCNC: 112 MMOL/L
CO2 SERPL-SCNC: 24 MMOL/L
CREAT SERPL-MCNC: 1.2 MG/DL
DIFFERENTIAL METHOD: ABNORMAL
EOSINOPHIL # BLD AUTO: ABNORMAL K/UL
EOSINOPHIL NFR BLD: 5 %
ERYTHROCYTE [DISTWIDTH] IN BLOOD BY AUTOMATED COUNT: 14.6 %
EST. GFR  (AFRICAN AMERICAN): >60 ML/MIN/1.73 M^2
EST. GFR  (NON AFRICAN AMERICAN): >60 ML/MIN/1.73 M^2
GLUCOSE SERPL-MCNC: 85 MG/DL
HCT VFR BLD AUTO: 38.9 %
HGB BLD-MCNC: 12.6 G/DL
LYMPHOCYTES # BLD AUTO: ABNORMAL K/UL
LYMPHOCYTES NFR BLD: 8 %
MAGNESIUM SERPL-MCNC: 1.9 MG/DL
MCH RBC QN AUTO: 32.9 PG
MCHC RBC AUTO-ENTMCNC: 32.4 %
MCV RBC AUTO: 102 FL
MONOCYTES # BLD AUTO: ABNORMAL K/UL
MONOCYTES NFR BLD: 11 %
NEUTROPHILS NFR BLD: 74 %
NEUTS BAND NFR BLD MANUAL: 1 %
PHOSPHATE SERPL-MCNC: 2 MG/DL
PLATELET # BLD AUTO: 133 K/UL
PMV BLD AUTO: 11.9 FL
POTASSIUM SERPL-SCNC: 4.1 MMOL/L
RBC # BLD AUTO: 3.83 M/UL
SODIUM SERPL-SCNC: 142 MMOL/L
WBC # BLD AUTO: 2.76 K/UL

## 2017-04-04 PROCEDURE — 36415 COLL VENOUS BLD VENIPUNCTURE: CPT | Mod: PO

## 2017-04-04 PROCEDURE — 85027 COMPLETE CBC AUTOMATED: CPT

## 2017-04-04 PROCEDURE — 80069 RENAL FUNCTION PANEL: CPT

## 2017-04-04 PROCEDURE — 85007 BL SMEAR W/DIFF WBC COUNT: CPT

## 2017-04-04 PROCEDURE — 80197 ASSAY OF TACROLIMUS: CPT

## 2017-04-04 PROCEDURE — 83735 ASSAY OF MAGNESIUM: CPT

## 2017-04-05 ENCOUNTER — TELEPHONE (OUTPATIENT)
Dept: TRANSPLANT | Facility: CLINIC | Age: 67
End: 2017-04-05

## 2017-04-05 LAB — TACROLIMUS BLD-MCNC: 9 NG/ML

## 2017-04-05 NOTE — TELEPHONE ENCOUNTER
Notified patient of Dr. Mcclain's review of 4/4/17 lab results. Patient reports he has been taking KPN 4 tabs 4 x daily as prescribed but has not eating foods high in phosphorus. Patient request not to increase KPN to 5 tabs 4 x daily at this time due to high cost of medications. Patient states he will increase his intake of foods high in phosphorus. Next lab appointment is 4/10/17. Patient verbalized understanding.

## 2017-04-05 NOTE — TELEPHONE ENCOUNTER
----- Message from Chasidy Mcclain MD sent at 4/5/2017  9:24 AM CDT -----  Low Phos: she is on K-Phos 4 tabs 4 times a day. Does she take her med? If so, please increase it to 5 tabs 4 times a day.

## 2017-04-05 NOTE — PROGRESS NOTES
Low Phos: she is KPhos 4 tabs 4 times a day. Does she take her med? If so, please increase it to 5 tabs 4 times a day.

## 2017-04-11 ENCOUNTER — TELEPHONE (OUTPATIENT)
Dept: TRANSPLANT | Facility: CLINIC | Age: 67
End: 2017-04-11

## 2017-04-11 ENCOUNTER — LAB VISIT (OUTPATIENT)
Dept: LAB | Facility: HOSPITAL | Age: 67
End: 2017-04-11
Attending: INTERNAL MEDICINE
Payer: MEDICARE

## 2017-04-11 DIAGNOSIS — Z94.0 KIDNEY REPLACED BY TRANSPLANT: ICD-10-CM

## 2017-04-11 LAB
ALBUMIN SERPL BCP-MCNC: 3.6 G/DL
ANION GAP SERPL CALC-SCNC: 9 MMOL/L
BASOPHILS # BLD AUTO: 0.01 K/UL
BASOPHILS NFR BLD: 0.2 %
BUN SERPL-MCNC: 23 MG/DL
CALCIUM SERPL-MCNC: 9 MG/DL
CHLORIDE SERPL-SCNC: 109 MMOL/L
CO2 SERPL-SCNC: 22 MMOL/L
CREAT SERPL-MCNC: 1.2 MG/DL
DIFFERENTIAL METHOD: ABNORMAL
EOSINOPHIL # BLD AUTO: 0.1 K/UL
EOSINOPHIL NFR BLD: 1.8 %
ERYTHROCYTE [DISTWIDTH] IN BLOOD BY AUTOMATED COUNT: 14.1 %
EST. GFR  (AFRICAN AMERICAN): >60 ML/MIN/1.73 M^2
EST. GFR  (NON AFRICAN AMERICAN): >60 ML/MIN/1.73 M^2
GLUCOSE SERPL-MCNC: 85 MG/DL
HCT VFR BLD AUTO: 37.9 %
HGB BLD-MCNC: 12.7 G/DL
LYMPHOCYTES # BLD AUTO: 0.5 K/UL
LYMPHOCYTES NFR BLD: 11.2 %
MAGNESIUM SERPL-MCNC: 1.7 MG/DL
MCH RBC QN AUTO: 33.3 PG
MCHC RBC AUTO-ENTMCNC: 33.5 %
MCV RBC AUTO: 100 FL
MONOCYTES # BLD AUTO: 0.4 K/UL
MONOCYTES NFR BLD: 7.8 %
NEUTROPHILS # BLD AUTO: 3.5 K/UL
NEUTROPHILS NFR BLD: 78.3 %
PHOSPHATE SERPL-MCNC: 3 MG/DL
PLATELET # BLD AUTO: 125 K/UL
PMV BLD AUTO: 11.8 FL
POTASSIUM SERPL-SCNC: 3.3 MMOL/L
RBC # BLD AUTO: 3.81 M/UL
SODIUM SERPL-SCNC: 140 MMOL/L
WBC # BLD AUTO: 4.46 K/UL

## 2017-04-11 PROCEDURE — 80069 RENAL FUNCTION PANEL: CPT

## 2017-04-11 PROCEDURE — 36415 COLL VENOUS BLD VENIPUNCTURE: CPT | Mod: PO

## 2017-04-11 PROCEDURE — 80197 ASSAY OF TACROLIMUS: CPT

## 2017-04-11 PROCEDURE — 83735 ASSAY OF MAGNESIUM: CPT

## 2017-04-11 PROCEDURE — 85025 COMPLETE CBC W/AUTO DIFF WBC: CPT

## 2017-04-11 NOTE — TELEPHONE ENCOUNTER
Received call from patient, requesting rx refills, but states unable to reach transplant pharmacy.  Call placed to pharmacy, patient warm transferred to pharm tech to take care of request.

## 2017-04-12 ENCOUNTER — TELEPHONE (OUTPATIENT)
Dept: TRANSPLANT | Facility: CLINIC | Age: 67
End: 2017-04-12

## 2017-04-12 LAB — TACROLIMUS BLD-MCNC: 7.5 NG/ML

## 2017-04-12 NOTE — TELEPHONE ENCOUNTER
Notified patient of Dr. Mcclain's review of 4/11/17 lab results. Instructed patient to increase intake of foods high in potassium. Patient verbalized understanding.

## 2017-04-12 NOTE — TELEPHONE ENCOUNTER
----- Message from Chasidy Mcclain MD sent at 4/12/2017  7:50 AM CDT -----    Low K: Increase high K diet for couple days. Thank you

## 2017-05-02 ENCOUNTER — LAB VISIT (OUTPATIENT)
Dept: LAB | Facility: HOSPITAL | Age: 67
End: 2017-05-02
Attending: INTERNAL MEDICINE
Payer: MEDICARE

## 2017-05-02 DIAGNOSIS — Z72.89 OTHER PROBLEMS RELATED TO LIFESTYLE: ICD-10-CM

## 2017-05-02 DIAGNOSIS — Z94.0 KIDNEY REPLACED BY TRANSPLANT: ICD-10-CM

## 2017-05-02 DIAGNOSIS — E83.39 HYPOPHOSPHATEMIA: ICD-10-CM

## 2017-05-02 LAB
25(OH)D3+25(OH)D2 SERPL-MCNC: 29 NG/ML
ALBUMIN SERPL BCP-MCNC: 3.6 G/DL
ALBUMIN SERPL BCP-MCNC: 3.6 G/DL
ALP SERPL-CCNC: 63 U/L
ALT SERPL W/O P-5'-P-CCNC: 40 U/L
ANION GAP SERPL CALC-SCNC: 8 MMOL/L
AST SERPL-CCNC: 31 U/L
BASOPHILS # BLD AUTO: 0.01 K/UL
BASOPHILS NFR BLD: 0.3 %
BILIRUB DIRECT SERPL-MCNC: 0.2 MG/DL
BILIRUB SERPL-MCNC: 0.5 MG/DL
BUN SERPL-MCNC: 20 MG/DL
CALCIUM SERPL-MCNC: 9 MG/DL
CHLORIDE SERPL-SCNC: 110 MMOL/L
CO2 SERPL-SCNC: 25 MMOL/L
CREAT SERPL-MCNC: 1.3 MG/DL
DIFFERENTIAL METHOD: ABNORMAL
EOSINOPHIL # BLD AUTO: 0.1 K/UL
EOSINOPHIL NFR BLD: 2.7 %
ERYTHROCYTE [DISTWIDTH] IN BLOOD BY AUTOMATED COUNT: 13.2 %
EST. GFR  (AFRICAN AMERICAN): >60 ML/MIN/1.73 M^2
EST. GFR  (NON AFRICAN AMERICAN): 56.9 ML/MIN/1.73 M^2
GLUCOSE SERPL-MCNC: 80 MG/DL
HCT VFR BLD AUTO: 38.3 %
HGB BLD-MCNC: 13.1 G/DL
LYMPHOCYTES # BLD AUTO: 0.3 K/UL
LYMPHOCYTES NFR BLD: 8.9 %
MAGNESIUM SERPL-MCNC: 1.8 MG/DL
MCH RBC QN AUTO: 33.7 PG
MCHC RBC AUTO-ENTMCNC: 34.2 %
MCV RBC AUTO: 99 FL
MONOCYTES # BLD AUTO: 0.4 K/UL
MONOCYTES NFR BLD: 10.8 %
NEUTROPHILS # BLD AUTO: 2.8 K/UL
NEUTROPHILS NFR BLD: 76.5 %
PHOSPHATE SERPL-MCNC: 3.3 MG/DL
PLATELET # BLD AUTO: 123 K/UL
PMV BLD AUTO: 11.7 FL
POTASSIUM SERPL-SCNC: 3.8 MMOL/L
PROT SERPL-MCNC: 6.4 G/DL
PTH-INTACT SERPL-MCNC: 323 PG/ML
RBC # BLD AUTO: 3.89 M/UL
RPR SER QL: NORMAL
SODIUM SERPL-SCNC: 143 MMOL/L
URATE SERPL-MCNC: 7.9 MG/DL
WBC # BLD AUTO: 3.7 K/UL

## 2017-05-02 PROCEDURE — 85025 COMPLETE CBC W/AUTO DIFF WBC: CPT

## 2017-05-02 PROCEDURE — 36415 COLL VENOUS BLD VENIPUNCTURE: CPT | Mod: PO

## 2017-05-02 PROCEDURE — 86592 SYPHILIS TEST NON-TREP QUAL: CPT

## 2017-05-02 PROCEDURE — 87799 DETECT AGENT NOS DNA QUANT: CPT

## 2017-05-02 PROCEDURE — 80197 ASSAY OF TACROLIMUS: CPT

## 2017-05-02 PROCEDURE — 82306 VITAMIN D 25 HYDROXY: CPT

## 2017-05-02 PROCEDURE — 83735 ASSAY OF MAGNESIUM: CPT

## 2017-05-02 PROCEDURE — 83970 ASSAY OF PARATHORMONE: CPT

## 2017-05-02 PROCEDURE — 84550 ASSAY OF BLOOD/URIC ACID: CPT

## 2017-05-02 PROCEDURE — 80069 RENAL FUNCTION PANEL: CPT

## 2017-05-02 PROCEDURE — 86780 TREPONEMA PALLIDUM: CPT

## 2017-05-02 PROCEDURE — 84075 ASSAY ALKALINE PHOSPHATASE: CPT

## 2017-05-03 ENCOUNTER — TELEPHONE (OUTPATIENT)
Dept: TRANSPLANT | Facility: CLINIC | Age: 67
End: 2017-05-03

## 2017-05-03 LAB
T PALLIDUM AB SER QL IF: NORMAL
TACROLIMUS BLD-MCNC: 9.9 NG/ML

## 2017-05-03 NOTE — TELEPHONE ENCOUNTER
----- Message from Jovanna Omalley MD sent at 5/3/2017  4:15 PM CDT -----  Results reviewed and the following message sent to patient via [facility]: Your urinalysis shows a lot of white cells suggestive of infection.  Please repeat a clean-catch midstream urine to Center repeat urinalysis and culture.

## 2017-05-03 NOTE — TELEPHONE ENCOUNTER
Notified patient of Dr. Pulido's review of 5/2/17 lab results. Instructed patient to repeat a clean-catch midstream urine tomorrow (repeat urinalysis and culture). Patient verbalized understanding.

## 2017-05-04 ENCOUNTER — LAB VISIT (OUTPATIENT)
Dept: LAB | Facility: HOSPITAL | Age: 67
End: 2017-05-04
Attending: INTERNAL MEDICINE
Payer: MEDICARE

## 2017-05-04 DIAGNOSIS — N39.0 URINARY TRACT INFECTION WITHOUT HEMATURIA, SITE UNSPECIFIED: ICD-10-CM

## 2017-05-04 DIAGNOSIS — Z94.0 KIDNEY REPLACED BY TRANSPLANT: ICD-10-CM

## 2017-05-04 DIAGNOSIS — D72.829 LEUKOCYTOSIS, UNSPECIFIED TYPE: ICD-10-CM

## 2017-05-04 LAB
BACTERIA #/AREA URNS AUTO: ABNORMAL /HPF
BILIRUB UR QL STRIP: NEGATIVE
BK VIRUS DNA PCR, QUANT, BLOOD: <250 COPIES/ML
BK VIRUS DNA, BLOOD: NOT DETECTED
CAOX CRY UR QL COMP ASSIST: ABNORMAL
CLARITY UR REFRACT.AUTO: ABNORMAL
COLOR UR AUTO: YELLOW
GLUCOSE UR QL STRIP: NEGATIVE
HGB UR QL STRIP: NEGATIVE
KETONES UR QL STRIP: NEGATIVE
LEUKOCYTE ESTERASE UR QL STRIP: ABNORMAL
LOG BKV COPIES/ML: <2.4 LOG (10) COPIES/ML
MICROSCOPIC COMMENT: ABNORMAL
NITRITE UR QL STRIP: NEGATIVE
PH UR STRIP: 6 [PH] (ref 5–8)
PROT UR QL STRIP: NEGATIVE
SP GR UR STRIP: 1.02 (ref 1–1.03)
URN SPEC COLLECT METH UR: ABNORMAL
UROBILINOGEN UR STRIP-ACNC: NEGATIVE EU/DL
WBC #/AREA URNS AUTO: >100 /HPF (ref 0–5)

## 2017-05-04 PROCEDURE — 87077 CULTURE AEROBIC IDENTIFY: CPT

## 2017-05-04 PROCEDURE — 81001 URINALYSIS AUTO W/SCOPE: CPT

## 2017-05-04 PROCEDURE — 87088 URINE BACTERIA CULTURE: CPT

## 2017-05-04 PROCEDURE — 87086 URINE CULTURE/COLONY COUNT: CPT

## 2017-05-04 PROCEDURE — 87186 SC STD MICRODIL/AGAR DIL: CPT

## 2017-05-05 NOTE — PROGRESS NOTES
The lab reviewed. UA shows + bacteria and leukocytes. Is patient symptomatic? Please consider urine culture first and then treat the patient with ciprofloxacin 500 mg BID for 5 days.

## 2017-05-08 LAB — BACTERIA UR CULT: NORMAL

## 2017-05-08 RX ORDER — CIPROFLOXACIN 500 MG/1
500 TABLET ORAL 2 TIMES DAILY
Qty: 14 TABLET | Refills: 0 | Status: SHIPPED | OUTPATIENT
Start: 2017-05-08 | End: 2017-05-15

## 2017-05-08 NOTE — TELEPHONE ENCOUNTER
Notified patient of Dr. Mcclain's review of 5/4/17 urine culture results. Instructed patient to take Cipro 500mg twice daily x 7 days. Patient verbalized understanding.

## 2017-05-08 NOTE — TELEPHONE ENCOUNTER
----- Message from Chasidy Mcclain MD sent at 5/8/2017 11:30 AM CDT -----  Will continue ciprofloxacin 500 mg BID for total 7 days.

## 2017-05-22 ENCOUNTER — OFFICE VISIT (OUTPATIENT)
Dept: TRANSPLANT | Facility: CLINIC | Age: 67
End: 2017-05-22
Payer: MEDICARE

## 2017-05-22 VITALS
WEIGHT: 150.81 LBS | HEART RATE: 69 BPM | BODY MASS INDEX: 21.11 KG/M2 | SYSTOLIC BLOOD PRESSURE: 130 MMHG | TEMPERATURE: 98 F | HEIGHT: 71 IN | RESPIRATION RATE: 16 BRPM | OXYGEN SATURATION: 100 % | DIASTOLIC BLOOD PRESSURE: 92 MMHG

## 2017-05-22 DIAGNOSIS — N39.0 URINARY TRACT INFECTION WITHOUT HEMATURIA, SITE UNSPECIFIED: ICD-10-CM

## 2017-05-22 DIAGNOSIS — N25.81 SECONDARY HYPERPARATHYROIDISM, RENAL: ICD-10-CM

## 2017-05-22 DIAGNOSIS — E87.20 METABOLIC ACIDOSIS: ICD-10-CM

## 2017-05-22 DIAGNOSIS — Z98.890 HISTORY OF AAA (ABDOMINAL AORTIC ANEURYSM) REPAIR: ICD-10-CM

## 2017-05-22 DIAGNOSIS — Z94.0 DECEASED-DONOR KIDNEY TRANSPLANT: ICD-10-CM

## 2017-05-22 DIAGNOSIS — Z79.60 LONG-TERM USE OF IMMUNOSUPPRESSANT MEDICATION: Primary | ICD-10-CM

## 2017-05-22 DIAGNOSIS — N39.0 URINARY TRACT INFECTION WITHOUT HEMATURIA, SITE UNSPECIFIED: Primary | ICD-10-CM

## 2017-05-22 PROCEDURE — 87086 URINE CULTURE/COLONY COUNT: CPT

## 2017-05-22 PROCEDURE — 87186 SC STD MICRODIL/AGAR DIL: CPT

## 2017-05-22 PROCEDURE — 99214 OFFICE O/P EST MOD 30 MIN: CPT | Mod: S$PBB,,, | Performed by: INTERNAL MEDICINE

## 2017-05-22 PROCEDURE — 99213 OFFICE O/P EST LOW 20 MIN: CPT | Mod: PBBFAC | Performed by: INTERNAL MEDICINE

## 2017-05-22 PROCEDURE — 99999 PR PBB SHADOW E&M-EST. PATIENT-LVL III: CPT | Mod: PBBFAC,,, | Performed by: INTERNAL MEDICINE

## 2017-05-22 PROCEDURE — 81001 URINALYSIS AUTO W/SCOPE: CPT

## 2017-05-22 PROCEDURE — 87088 URINE BACTERIA CULTURE: CPT

## 2017-05-22 PROCEDURE — 87077 CULTURE AEROBIC IDENTIFY: CPT

## 2017-05-22 NOTE — LETTER
May 22, 2017                     [facility]  [address]  [facility] [address]  Phone: [phone]   Patient: Alin Burkett   MR Number: [id number]   YOB: 1950   Date of Visit: 5/22/2017       Dear      Thank you for referring Alin Burkett to me for evaluation. Attached you will find relevant portions of my assessment and plan of care.    If you have questions, please do not hesitate to call me. I look forward to following Alin Burkett along with you.    Sincerely,    Chasidy Mcclain MD    Enclosure    If you would like to receive this communication electronically, please contact [email or web address] or [phone] to request [employer] Link access.    [employer] Link is a tool which provides read-only access to select patient information with whom you have a relationship. Its easy to use and provides real time access to review your patients record including encounter summaries, notes, results, and demographic information.    If you feel you have received this communication in error or would no longer like to receive these types of communications, please e-mail [email or web address]

## 2017-05-22 NOTE — PROGRESS NOTES
Kidney Post-Transplant Assessment    Referring Physician: Lake Merchant    ORGAN: LEFT KIDNEY  Donor Type:  - brain death   PHS Increased Risk: yes  Cold Ischemia: 854 mins  Induction Medications: campath - alemtuzumab (anti-cd52)      Subjective:     CC:  Reassessment of renal allograft function and management of chronic immunosuppression.      Mr. Burkett is a 67 y.o. year old Black or  male who received a  - brain death kidney transplant on 16. Mr. Burkett had  ESRD HD due to HTN  and congenital absent left kidney who was on dialysis since 6/16/10 until receiving PHS increased risk DDRT (pumped kidney, Campath induction, KDPI 36%, WIT 30 minutes, CIT 14 hours and 14 minutes, donor RPR positive thus patient completed PCN x3, CMV D+/R+) on 16. His most recent creatinine is 1.3. He takes mycophenolate mofetil, prednisone and tacrolimus  For Immunosuppression. He is on Bactrim X 1 year and Valcyte X 3 months.       Post Transplant Course:   - Pt had DIVINE with suspected rejection on . Pt did receive 1 dose of SMP after labs drawn. Kid bx  revealed AVR and he was d/c'd home while awaiting results. Had thymo x 5-7 doses. DSA  neg.  -  - He was rebiopsied on 17 which showed acute tubular injury and vacuolization, with + myoglobulin and negative heme.  - Recurrent UTI, last one May 8, 2017 completed Cipro 500 mg BID for UTI    Interval History: patient complains of mild burning sensation which improved after completing cipro.  he denies any chest pain, shortness of breath, ENT symptoms, nausea/vomiting, diarrhea,  frequency, urgency, or pain over the allograft. His home BPs are acceptable.       Medications:   Current Outpatient Prescriptions   Medication Sig Dispense Refill    cholecalciferol, vitamin D3, 1,000 unit capsule Take 1 capsule (1,000 Units total) by mouth once daily. 30 capsule 11    famotidine (PEPCID) 20 MG tablet Take 1 tablet (20 mg total) by  mouth every evening. 30 tablet 11    k phos di & mono-sod phos mono (K-PHOS-NEUTRAL) 250 mg Tab Take 4 tablets by mouth 4 (four) times daily. 480 tablet 11    ketoconazole (NIZORAL) 200 mg Tab Take 0.5 tablets (100 mg total) by mouth once daily. 15 tablet 5    levothyroxine (SYNTHROID) 112 MCG tablet Take 1 tablet (112 mcg total) by mouth once daily. 30 tablet 6    magnesium oxide (MAG-OX) 400 mg tablet Take 1 tablet (400 mg total) by mouth once daily. 30 tablet 11    metoprolol tartrate (LOPRESSOR) 25 MG tablet Take 0.5 tablets (12.5 mg total) by mouth 2 (two) times daily. 30 tablet 11    multivitamin (ONE DAILY MULTIVITAMIN) per tablet Take 1 tablet by mouth once daily.      mycophenolate (CELLCEPT) 250 mg Cap Take 3 capsules (750 mg total) by mouth 2 (two) times daily. Z94.0/Kidney Transplant on 11/26/16 180 capsule 11    predniSONE (DELTASONE) 10 MG tablet Take 20 mg PO QD 1/13-2/12; 15 mg PO QD 2/13-3/12; 10 mg PO QD 3/13-4/12; then 5 mg PO QD thereafter 60 tablet 11    sodium bicarbonate 650 MG tablet Take 3 tablets (1,950 mg total) by mouth 2 (two) times daily. 180 tablet 11    sulfamethoxazole-trimethoprim 400-80mg (BACTRIM,SEPTRA) 400-80 mg per tablet Take 1 tablet by mouth once daily. Stop: 11/26/17 30 tablet 11    tacrolimus (PROGRAF) 1 MG Cap Take 5 capsules (5 mg total) by mouth every 12 (twelve) hours. Z94.0/Kidney Transplant on 11/26/16 300 capsule 11     No current facility-administered medications for this visit.          Review of Systems  Constitutional: Negative for fever, appetite change and fatigue.   HENT: Negative for hearing loss, sore throat and mouth sores.   Eyes: Negative for photophobia, pain and visual disturbance.   Respiratory: Negative for cough, chest tightness, shortness of breath and wheezing.   Cardiovascular: Negative for chest pain, palpitations and leg swelling.   Gastrointestinal: Negative for nausea, vomiting, abdominal pain, diarrhea, constipation, blood in  "stool and abdominal distention.   Genitourinary: Negative for urgency, frequency, hematuria, decreased urine volume, difficulty urinating. + mild dysuria  Hematological: Negative for adenopathy. Does not bruise/bleed easily.   Psychiatric/Behavioral: Negative for confusion, sleep disturbance and dysphoric mood. The patient is not nervous/anxious.         Objective:     Blood pressure (!) 130/92, pulse 69, temperature 98.4 °F (36.9 °C), temperature source Oral, resp. rate 16, height 5' 11" (1.803 m), weight 68.4 kg (150 lb 12.7 oz), SpO2 100 %.  body mass index is 21.03 kg/m².    Physical Exam  General: No acute distress, well groomed  HEENT: Normocephalic, atraumatic, moist mucous membranes, no oral ulcerations/lesions  Neck: Supple, symmetrical, trachea midline, no thyromegaly, no JVD  Respiratory: Clear to auscultation bilaterally, respirations unlabored, no rales/rhonchi/wheezing  Cardiovacular: Regular rate and rhythm, S1, S2 normal, no murmurs, rubs or gallops, no carotid bruit  Gastrointestinal: Soft, non-tender, bowel sounds normal, no hepatosplenomegaly  Renal allograft exam: No tenderness, no bruits, normal exam  Musculoskeletal: No knee or ankle joint tenderness or swelling.   Extremities: No clubbing or cyanosis of bilateral upper extremities; no lower extremity edema bilaterally, radial pulses 2+ bilaterally, symmetric  Skin: warm and dry; no rash on exposed skin  Neurologic: CN grossly intact, alert and oriented x 3    Labs:  Lab Results   Component Value Date    WBC 3.70 (L) 05/02/2017    HGB 13.1 (L) 05/02/2017    HCT 38.3 (L) 05/02/2017     05/02/2017    K 3.8 05/02/2017     05/02/2017    CO2 25 05/02/2017    BUN 20 05/02/2017    CREATININE 1.3 05/02/2017    EGFRNONAA 56.9 (A) 05/02/2017    CALCIUM 9.0 05/02/2017    PHOS 3.3 05/02/2017    MG 1.8 05/02/2017    ALBUMIN 3.6 05/02/2017    ALBUMIN 3.6 05/02/2017    AST 31 05/02/2017    ALT 40 05/02/2017    UTPCR 0.19 05/02/2017    .0 " (H) 2017    TACROLIMUS 9.9 2017    CYCLOSPORINE <10 (L) 01/10/2017           Assessment/Plan:     1. Long-term use of immunosuppressant medication    2. -donor kidney transplant 16    3. History of AAA (abdominal aortic aneurysm) repair,     4. Secondary hyperparathyroidism, renal    5. Metabolic acidosis        Mr. Burkett is a 67 y.o. male with:     # History of ESRD presumed secondary to HTN and congenital solitary kidney  - s/p DDRT on 16  - Pt had DIVINE with suspected rejection on . Pt did receive 1 dose of SMP after labs drawn. Kid bx 17 revealed AVR. Had thymo x 5-7 doses. DSA  neg.    - Kidney rebiopsy on 17 showed acute tubular injury and vacuolization, with + myoglobulin and negative heme.  - last Cr 1.3  - non significant proteinuria    # Immunosuppression:   - continue Prograf at the current dose, recently adjusted  - continue  mg BID   - continue prednisone   - will monitor closely for toxicities    # Antimicrobial Prophylaxis:   - continue  for PJP prophylaxis   - continue Valcyte for CMV prophylaxis   - continue nystatin for thrush prophylaxis for 1 month     # mild dysuria  - has history of urethral stricture in the past  - recurrent UTI after surgery  - Will refer him to urology    # Infectious Surveillance:   - last CMV : negative  - last BK PCR : negative    # HTN:   - BPs well controlled   - continue with home blood pressure monitoring  - low salt and healthy life discussed with the patient    # Metabolic Bone Disease/Secondary Hyperparathyroidism:  - stable Ca, PO4 and Mg  - will monitor PTH, calcium, and phosphorus as per our center protocol    # Anemia of chronic disease:   - Hb stable  - will continue monitoring as per our center guidelines. No indication for acute intervention today    # Hyperuricemia of 7.9  - educated regarding low purine diet  - will monitor uric acid    Chasidy Mcclain MD     Education:   Material provided to the  patient.  Patient reminded to call with any health changes since these can be early signs of significant complications.  Also, I advised the patient to be sure any new medications or changes of old medications are discussed with either a pharmacist or physician knowledgeable with transplant to avoid rejection/drug toxicity related to significant drug interactions.    UNOS Patient Status  Functional Status: 80% - Normal activity with effort: some symptoms of disease  Physical Capacity: No Limitations

## 2017-05-23 LAB
BACTERIA #/AREA URNS AUTO: ABNORMAL /HPF
BILIRUB UR QL STRIP: NEGATIVE
CLARITY UR REFRACT.AUTO: CLEAR
COLOR UR AUTO: YELLOW
GLUCOSE UR QL STRIP: NEGATIVE
HGB UR QL STRIP: NEGATIVE
KETONES UR QL STRIP: NEGATIVE
LEUKOCYTE ESTERASE UR QL STRIP: ABNORMAL
MICROSCOPIC COMMENT: ABNORMAL
NITRITE UR QL STRIP: NEGATIVE
PH UR STRIP: 6 [PH] (ref 5–8)
PROT UR QL STRIP: NEGATIVE
RBC #/AREA URNS AUTO: 1 /HPF (ref 0–4)
SP GR UR STRIP: 1.01 (ref 1–1.03)
SQUAMOUS #/AREA URNS AUTO: 31 /HPF
URN SPEC COLLECT METH UR: ABNORMAL
UROBILINOGEN UR STRIP-ACNC: NEGATIVE EU/DL
WBC #/AREA URNS AUTO: 25 /HPF (ref 0–5)

## 2017-05-25 ENCOUNTER — LAB VISIT (OUTPATIENT)
Dept: LAB | Facility: HOSPITAL | Age: 67
End: 2017-05-25
Attending: INTERNAL MEDICINE
Payer: MEDICARE

## 2017-05-25 ENCOUNTER — PATIENT MESSAGE (OUTPATIENT)
Dept: TRANSPLANT | Facility: CLINIC | Age: 67
End: 2017-05-25

## 2017-05-25 DIAGNOSIS — Z94.0 KIDNEY REPLACED BY TRANSPLANT: ICD-10-CM

## 2017-05-25 LAB — BACTERIA UR CULT: NORMAL

## 2017-05-25 PROCEDURE — 80197 ASSAY OF TACROLIMUS: CPT

## 2017-05-25 PROCEDURE — 36415 COLL VENOUS BLD VENIPUNCTURE: CPT | Mod: PO

## 2017-05-25 NOTE — TELEPHONE ENCOUNTER
----- Message from Chasidy Mcclain MD sent at 5/25/2017  3:37 PM CDT -----  Please start Nitrofurantoin  mg q 12 hours x 10 days (give with food). Thank you

## 2017-05-25 NOTE — TELEPHONE ENCOUNTER
Notified patient of Dr. Mcclain's review of urine culture results. Instructed patient to start Nitrofurantoin  mg q 12 hours x 10 days (give with food). Patient verbalized understanding.     Prescription called into [facility] @ [phone]  Pharmacist: Jovanna Martínez

## 2017-05-26 LAB — TACROLIMUS BLD-MCNC: 5.6 NG/ML

## 2017-05-26 RX ORDER — NITROFURANTOIN (MACROCRYSTALS) 100 MG/1
100 CAPSULE ORAL EVERY 12 HOURS
Qty: 20 CAPSULE | Refills: 0 | Status: SHIPPED | OUTPATIENT
Start: 2017-05-26 | End: 2017-06-05

## 2017-05-30 NOTE — TELEPHONE ENCOUNTER
Received a phone call from [facility]'s pharmacy requesting a new prescription for prednisone be sent because patient is almost out.

## 2017-05-31 RX ORDER — PREDNISONE 5 MG/1
5 TABLET ORAL DAILY
Qty: 30 TABLET | Refills: 11 | Status: SHIPPED | OUTPATIENT
Start: 2017-05-31 | End: 2018-02-20 | Stop reason: SDUPTHER

## 2017-06-05 NOTE — TELEPHONE ENCOUNTER
----- Message from Lisa Moore sent at 6/5/2017 10:39 AM CDT -----  Contact: [facility]  Faxing over a script request for k phos di & mono-sod phos mono (K-PHOS-NEUTRAL) 250 mg Tab is running out due to dosage change can call @ # [phone].

## 2017-06-06 ENCOUNTER — LAB VISIT (OUTPATIENT)
Dept: LAB | Facility: HOSPITAL | Age: 67
End: 2017-06-06
Attending: INTERNAL MEDICINE
Payer: MEDICARE

## 2017-06-06 DIAGNOSIS — Z94.0 STATUS POST KIDNEY TRANSPLANT: ICD-10-CM

## 2017-06-06 DIAGNOSIS — Z94.0 KIDNEY REPLACED BY TRANSPLANT: ICD-10-CM

## 2017-06-06 DIAGNOSIS — Z79.899 ENCOUNTER FOR LONG-TERM (CURRENT) USE OF OTHER MEDICATIONS: ICD-10-CM

## 2017-06-06 LAB
ALBUMIN SERPL BCP-MCNC: 3.5 G/DL
ANION GAP SERPL CALC-SCNC: 9 MMOL/L
BASOPHILS # BLD AUTO: 0.01 K/UL
BASOPHILS NFR BLD: 0.2 %
BUN SERPL-MCNC: 31 MG/DL
CALCIUM SERPL-MCNC: 8.8 MG/DL
CHLORIDE SERPL-SCNC: 108 MMOL/L
CO2 SERPL-SCNC: 22 MMOL/L
CREAT SERPL-MCNC: 1.6 MG/DL
DIFFERENTIAL METHOD: ABNORMAL
EOSINOPHIL # BLD AUTO: 0.1 K/UL
EOSINOPHIL NFR BLD: 2.4 %
ERYTHROCYTE [DISTWIDTH] IN BLOOD BY AUTOMATED COUNT: 13.1 %
EST. GFR  (AFRICAN AMERICAN): 51.1 ML/MIN/1.73 M^2
EST. GFR  (NON AFRICAN AMERICAN): 44.2 ML/MIN/1.73 M^2
GLUCOSE SERPL-MCNC: 79 MG/DL
HCT VFR BLD AUTO: 36.8 %
HGB BLD-MCNC: 12.1 G/DL
LYMPHOCYTES # BLD AUTO: 1.3 K/UL
LYMPHOCYTES NFR BLD: 25 %
MAGNESIUM SERPL-MCNC: 2.1 MG/DL
MCH RBC QN AUTO: 32.1 PG
MCHC RBC AUTO-ENTMCNC: 32.9 %
MCV RBC AUTO: 98 FL
MONOCYTES # BLD AUTO: 0.7 K/UL
MONOCYTES NFR BLD: 12.8 %
NEUTROPHILS # BLD AUTO: 3 K/UL
NEUTROPHILS NFR BLD: 59 %
PHOSPHATE SERPL-MCNC: 3.7 MG/DL
PLATELET # BLD AUTO: 121 K/UL
PMV BLD AUTO: 11.5 FL
POTASSIUM SERPL-SCNC: 3.3 MMOL/L
RBC # BLD AUTO: 3.77 M/UL
SODIUM SERPL-SCNC: 139 MMOL/L
WBC # BLD AUTO: 5.08 K/UL

## 2017-06-06 PROCEDURE — 80197 ASSAY OF TACROLIMUS: CPT

## 2017-06-06 PROCEDURE — 80069 RENAL FUNCTION PANEL: CPT

## 2017-06-06 PROCEDURE — 83735 ASSAY OF MAGNESIUM: CPT

## 2017-06-06 PROCEDURE — 85025 COMPLETE CBC W/AUTO DIFF WBC: CPT

## 2017-06-06 PROCEDURE — 36415 COLL VENOUS BLD VENIPUNCTURE: CPT | Mod: PO

## 2017-06-06 NOTE — PROGRESS NOTES
Tell the patient to gentle increase K with diet 1 banana, 1 portion of tomatoes/day  Increase hydration  Other labs pending  Is he on any diuretic

## 2017-06-07 LAB
CMV DNA SERPL NAA+PROBE-ACNC: NORMAL IU/ML
TACROLIMUS BLD-MCNC: 10.4 NG/ML

## 2017-06-07 RX ORDER — TACROLIMUS 1 MG/1
CAPSULE ORAL
Qty: 270 CAPSULE | Refills: 11 | Status: SHIPPED | OUTPATIENT
Start: 2017-06-07 | End: 2017-08-22 | Stop reason: SDUPTHER

## 2017-06-07 NOTE — TELEPHONE ENCOUNTER
Notified patient of Dr. Zimmerman review of 66/17 lab results. Instructed patient to decrease Prograf to 5mg in AM & 4mg in PM; increase food high in potassium & fluid intake to at least 2.5-3L water/fluids daily.

## 2017-06-07 NOTE — TELEPHONE ENCOUNTER
----- Message from Travis Zimmerman MD sent at 6/7/2017  2:17 PM CDT -----  Please lower tacro to 5/4 repeat level in 2 weeks

## 2017-06-07 NOTE — TELEPHONE ENCOUNTER
----- Message from Travis Zimmerman MD sent at 6/6/2017  4:42 PM CDT -----  Tell the patient to gentle increase K with diet 1 banana, 1 portion of tomatoes/day  Increase hydration  Other labs pending  Is he on any diuretic

## 2017-06-19 ENCOUNTER — LAB VISIT (OUTPATIENT)
Dept: LAB | Facility: HOSPITAL | Age: 67
End: 2017-06-19
Attending: INTERNAL MEDICINE
Payer: MEDICARE

## 2017-06-19 DIAGNOSIS — Z94.0 KIDNEY REPLACED BY TRANSPLANT: ICD-10-CM

## 2017-06-19 PROCEDURE — 80197 ASSAY OF TACROLIMUS: CPT

## 2017-06-19 PROCEDURE — 36415 COLL VENOUS BLD VENIPUNCTURE: CPT | Mod: PO

## 2017-06-20 ENCOUNTER — PATIENT MESSAGE (OUTPATIENT)
Dept: TRANSPLANT | Facility: CLINIC | Age: 67
End: 2017-06-20

## 2017-06-20 LAB — TACROLIMUS BLD-MCNC: 10.5 NG/ML

## 2017-06-22 ENCOUNTER — TELEPHONE (OUTPATIENT)
Dept: TRANSPLANT | Facility: CLINIC | Age: 67
End: 2017-06-22

## 2017-06-22 DIAGNOSIS — N39.0 URINARY TRACT INFECTION WITHOUT HEMATURIA, SITE UNSPECIFIED: Primary | ICD-10-CM

## 2017-06-22 RX ORDER — CIPROFLOXACIN 500 MG/1
500 TABLET ORAL 2 TIMES DAILY
Qty: 20 TABLET | Refills: 0 | Status: CANCELLED | OUTPATIENT
Start: 2017-06-22 | End: 2017-07-02

## 2017-06-22 RX ORDER — CEFPODOXIME PROXETIL 100 MG/1
100 TABLET, FILM COATED ORAL 2 TIMES DAILY
Qty: 14 TABLET | Refills: 0 | Status: SHIPPED | OUTPATIENT
Start: 2017-06-22 | End: 2017-06-26

## 2017-06-22 NOTE — TELEPHONE ENCOUNTER
Alin has another UTI and needs antibiotics.   I've ordered cefpedoxime BID x7 days; rx sent to [facility]'s.    Also, I see no recent  follow up. He needs to see Dr. Mann (urology) given prior hx TURP for BPH and urethral stricture. He should also have US of native and transplanted kidneys.

## 2017-06-22 NOTE — TELEPHONE ENCOUNTER
----- Message from Jovanna Omalley MD sent at 6/22/2017  4:03 PM CDT -----  Results reviewed, and action is needed: See telephone encounter- needs atb and  consult.

## 2017-06-22 NOTE — TELEPHONE ENCOUNTER
Notified patient of Dr. Pulido's review of 6/19/17 urine results. Instructed patient to start Cipro 500mg twice daily x 10 days. Informed patient that an appointment with Urology will be scheduled due to recurrent UTIs. Patient verbalized understanding.

## 2017-06-23 NOTE — TELEPHONE ENCOUNTER
Clarification of medication: 6/22/17 @ 5:45PM  Notified patient of prescription called into [facility]'s pharmacy by Dr. Pulido. Instructed patient to take cefpodoxime 100mg twice daily x 7 days. Informed patient that a Urology  appointment will be scheduled due to recurrent UTIs.  Patient verbalized understanding.

## 2017-06-26 ENCOUNTER — OFFICE VISIT (OUTPATIENT)
Dept: UROLOGY | Facility: CLINIC | Age: 67
End: 2017-06-26
Payer: MEDICARE

## 2017-06-26 VITALS
SYSTOLIC BLOOD PRESSURE: 107 MMHG | HEART RATE: 70 BPM | WEIGHT: 143 LBS | DIASTOLIC BLOOD PRESSURE: 66 MMHG | BODY MASS INDEX: 20.02 KG/M2 | HEIGHT: 71 IN

## 2017-06-26 DIAGNOSIS — N39.0 URINARY TRACT INFECTION WITHOUT HEMATURIA, SITE UNSPECIFIED: Primary | ICD-10-CM

## 2017-06-26 DIAGNOSIS — R30.0 DYSURIA: ICD-10-CM

## 2017-06-26 DIAGNOSIS — N40.0 BENIGN NON-NODULAR PROSTATIC HYPERPLASIA, PRESENCE OF LOWER URINARY TRACT SYMPTOMS UNSPECIFIED: Chronic | ICD-10-CM

## 2017-06-26 DIAGNOSIS — R39.198 SLOW URINARY STREAM: ICD-10-CM

## 2017-06-26 DIAGNOSIS — Z94.0 S/P KIDNEY TRANSPLANT: ICD-10-CM

## 2017-06-26 DIAGNOSIS — Z79.60 LONG-TERM USE OF IMMUNOSUPPRESSANT MEDICATION: ICD-10-CM

## 2017-06-26 LAB
BILIRUB SERPL-MCNC: NORMAL MG/DL
BLOOD URINE, POC: NORMAL
COLOR, POC UA: NORMAL
GLUCOSE UR QL STRIP: NORMAL
KETONES UR QL STRIP: NORMAL
LEUKOCYTE ESTERASE URINE, POC: NORMAL
NITRITE, POC UA: NORMAL
PH, POC UA: 5
POC RESIDUAL URINE VOLUME: 26 ML (ref 0–100)
PROTEIN, POC: NORMAL
SPECIFIC GRAVITY, POC UA: 1.01
UROBILINOGEN, POC UA: NORMAL

## 2017-06-26 PROCEDURE — 99214 OFFICE O/P EST MOD 30 MIN: CPT | Mod: S$PBB,,, | Performed by: NURSE PRACTITIONER

## 2017-06-26 PROCEDURE — 1126F AMNT PAIN NOTED NONE PRSNT: CPT | Mod: ,,, | Performed by: NURSE PRACTITIONER

## 2017-06-26 PROCEDURE — 87088 URINE BACTERIA CULTURE: CPT

## 2017-06-26 PROCEDURE — 51798 US URINE CAPACITY MEASURE: CPT | Mod: PBBFAC | Performed by: NURSE PRACTITIONER

## 2017-06-26 PROCEDURE — 81002 URINALYSIS NONAUTO W/O SCOPE: CPT | Mod: PBBFAC | Performed by: NURSE PRACTITIONER

## 2017-06-26 PROCEDURE — 1159F MED LIST DOCD IN RCRD: CPT | Mod: ,,, | Performed by: NURSE PRACTITIONER

## 2017-06-26 PROCEDURE — 99214 OFFICE O/P EST MOD 30 MIN: CPT | Mod: PBBFAC,25 | Performed by: NURSE PRACTITIONER

## 2017-06-26 PROCEDURE — 99999 PR PBB SHADOW E&M-EST. PATIENT-LVL IV: CPT | Mod: PBBFAC,,, | Performed by: NURSE PRACTITIONER

## 2017-06-26 PROCEDURE — 87086 URINE CULTURE/COLONY COUNT: CPT

## 2017-06-26 PROCEDURE — 87186 SC STD MICRODIL/AGAR DIL: CPT

## 2017-06-26 PROCEDURE — 87077 CULTURE AEROBIC IDENTIFY: CPT

## 2017-06-26 RX ORDER — CIPROFLOXACIN 250 MG/1
500 TABLET, FILM COATED ORAL ONCE
Status: CANCELLED | OUTPATIENT
Start: 2017-06-26 | End: 2017-06-26

## 2017-06-26 RX ORDER — LIDOCAINE HYDROCHLORIDE 20 MG/ML
JELLY TOPICAL ONCE
Status: CANCELLED | OUTPATIENT
Start: 2017-06-26 | End: 2017-06-26

## 2017-06-26 NOTE — PROGRESS NOTES
CHIEF COMPLAINT:    Mr. Burkett is a 67 y.o. male presenting for penile pain.   PRESENTING ILLNESS:    Alin Burkett is a 67 y.o. male with a PMH of kidney transplant in 2016 who presents for urethral pain.  Last seen in the clinic with Dr. Mann in .     s/p right ESWL back in 10/12.  Hx of urethral stricture  Hx of kidney transplant in   Cysto was done back in 2013 revealing 16 F mild stricture, which was dilated by Dr. Mann.    Today he reports recurrent UTIs since 2017. He is currently taking Vantin BID since 17 for a UTI. He will take a total of days. He states he was on cipro but it did not help his symptoms.  He continue to report burning in his urethra when he is not urinating, frequency, and a weak slow FOS.  Denies dysuria, urgency, and hematuria. Denies abdominal and flank pain.  Reports complete bladder emptying.  He takes bactrim and prograf.      REVIEW OF SYSTEMS:    Review of Systems    Constitutional: Negative for fever and chills.   HENT: Negative for hearing loss.   Eyes: Negative for visual disturbance.   Respiratory: Negative for shortness of breath.   Cardiovascular: Negative for chest pain.   Gastrointestinal: Negative for nausea, vomiting, and constipation.   Genitourinary:  See above.   Neurological: Negative for dizziness.   Hematological: Does not bruise/bleed easily.   Psychiatric/Behavioral: Negative for confusion.       PATIENT HISTORY:    Past Medical History:   Diagnosis Date    Acidosis     Adrenal adenoma     Anemia associated with chronic renal failure     Arrhythmia, onset 2015    Awaiting organ transplant status 2013    Basal cell carcinoma 2012    left nasal tip    Blood type B+ 2013    Cancer     Celiac artery dissection     Chronic diarrhea     Chronic urethral stricture     Congenital absence of kidney     left    -donor kidney transplant 16     Induced w Campath 30 mg IV intraoperatively & SoluMedrol  875 mg total over 3 days.  Renal allograft biopsy 1/6/17 ([facility]): 21 glomeruli, none globally sclerosed, <5% interstitial fibrosis, no ACR, c4d negative, AVR CCT Type 2 (V1 lesion); plan THYMO     Dissecting aortic aneurysm (any part), abdominal     Encounter for blood transfusion     ESRD (end stage renal disease) 06/16/2010    H/O: urethral stricture     History of AAA (abdominal aortic aneurysm) repair     Hypertension     Hypokalemia     Hypothyroidism 1/10/2014    Inguinal hernia bilateral, non-recurrent     Kidney stones     Organ transplant candidate 11/26/2013    Plantar warts 1/10/2014    S/P kidney transplant     Secondary hyperparathyroidism, renal     Thyroid disease        Past Surgical History:   Procedure Laterality Date    ABDOMINAL SURGERY      exploratory lapatomy x 2    AORTA - SUPERIOR MESENTERIC ARTERY BYPASS GRAFT      BLADDER NECK RECONSTRUCTION      BLADDER SURGERY      CYSTOSCOPY      GASTROJEJUNOSTOMY      HEMORRHOID SURGERY      HERNIA REPAIR      KIDNEY TRANSPLANT      LITHOTRIPSY      PERCUTANEOUS NEPHROLITHOTRIPSY      right  ESWL  10/31/12    right ESWL  6/26/12       Family History   Problem Relation Age of Onset    Diabetes Mother     Cancer Brother      thyroid cancer    Stroke Maternal Aunt     Kidney disease Paternal Uncle     Kidney disease Cousin     Melanoma Neg Hx     Psoriasis Neg Hx     Lupus Neg Hx     Eczema Neg Hx        Social History     Social History    Marital status: Significant Other     Spouse name: N/A    Number of children: N/A    Years of education: N/A     Occupational History     Disabled     Social History Main Topics    Smoking status: Former Smoker     Years: 40.00     Types: Cigarettes     Quit date: 6/16/2010    Smokeless tobacco: Never Used    Alcohol use No      Comment: stopped ETOH in 2010    Drug use: No      Comment: THC in youth    Sexual activity: Yes     Partners: Female     Birth control/ protection:  None     Other Topics Concern    Not on file     Social History Narrative    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    Medications:    Current Outpatient Prescriptions:     famotidine (PEPCID) 20 MG tablet, Take 1 tablet (20 mg total) by mouth every evening., Disp: 30 tablet, Rfl: 11    k phos di & mono-sod phos mono (K-PHOS-NEUTRAL) 250 mg Tab, Take 4 tablets by mouth 4 (four) times daily., Disp: 480 tablet, Rfl: 11    ketoconazole (NIZORAL) 200 mg Tab, Take 0.5 tablets (100 mg total) by mouth once daily., Disp: 15 tablet, Rfl: 5    levothyroxine (SYNTHROID) 112 MCG tablet, Take 1 tablet (112 mcg total) by mouth once daily., Disp: 30 tablet, Rfl: 6    magnesium oxide (MAG-OX) 400 mg tablet, Take 1 tablet (400 mg total) by mouth once daily., Disp: 30 tablet, Rfl: 11    metoprolol tartrate (LOPRESSOR) 25 MG tablet, Take 0.5 tablets (12.5 mg total) by mouth 2 (two) times daily., Disp: 30 tablet, Rfl: 11    multivitamin (ONE DAILY MULTIVITAMIN) per tablet, Take 1 tablet by mouth once daily., Disp: , Rfl:     mycophenolate (CELLCEPT) 250 mg Cap, Take 3 capsules (750 mg total) by mouth 2 (two) times daily. Z94.0/Kidney Transplant on 11/26/16, Disp: 180 capsule, Rfl: 11    predniSONE (DELTASONE) 5 MG tablet, Take 1 tablet (5 mg total) by mouth once daily. Z94.0/Kidney transplant on 11/26/2016, Disp: 30 tablet, Rfl: 11    sodium bicarbonate 650 MG tablet, Take 3 tablets (1,950 mg total) by mouth 2 (two) times daily., Disp: 180 tablet, Rfl: 11    sulfamethoxazole-trimethoprim 400-80mg (BACTRIM,SEPTRA) 400-80 mg per tablet, Take 1 tablet by mouth once daily. Stop: 11/26/17, Disp: 30 tablet, Rfl: 11    tacrolimus (PROGRAF) 1 MG Cap, Take 5 capsules (5mg total) in AM & 4 capsules (4mg total) in PM by mouth daily. Z94.0/Kidney Transplant on 11/26/16, Disp: 270 capsule, Rfl: 11    PHYSICAL EXAMINATION:    Constitutional: He is  oriented to person, place, and time. He appears well-developed and well-nourished.  He is in no apparent distress.    Neck: No tracheal deviation present.     Cardiovascular: Normal rate.      Pulmonary/Chest: Effort normal. No respiratory distress.     Abdominal:  He exhibits no distension.  There is no CVA tenderness.     Lymphadenopathy:        Right: No supraclavicular adenopathy present.        Left: No supraclavicular adenopathy present.     Neurological: He is alert and oriented to person, place, and time.     Skin: Skin is warm and dry.     Extremities: No pitting edema noted in lower extremities bilaterally    Psych: Cooperative with normal affect.    Genitourinary: The penis is normal. The urethral meatus is normal.     Normal anal sphincter tone. No rectal mass.    The prostate is ~40 g.  Prostate is boggy, non tender with no nodules noted.    Physical Exam      LABS:  PVR today with bladder scanner was 26 cc.   U/a today + blood and trace protein. No bacteria present.     Lab Results   Component Value Date    PSA 0.41 10/18/2016    PSA 0.32 10/20/2015    PSA 0.45 11/04/2014       IMPRESSION:    Encounter Diagnoses   Name Primary?    Urinary tract infection without hematuria, site unspecified Yes    Dysuria     S/P kidney transplant     Long-term use of immunosuppressant medication     Benign non-nodular prostatic hyperplasia, presence of lower urinary tract symptoms unspecified          PLAN:  -Reassured pt,   -Continue Vantin.  -Post prostate massage UC sent to the lab. Will notify with results.   -Discussed the need for a cysto. Will have it scheduled with Dr. Mann.  He cannot do July 9-20 bc he is out of town.     I encouraged him or any of his family members to call or email me with questions and/or concerns.      VERONICA Gloria

## 2017-06-26 NOTE — LETTER
June 28, 2017      Jovanna Omalley MD  [address]  [place] [address]           [facility]  [address]  [facility] [address]  Phone: [phone]          Patient: Alin Burkett   MR Number: [id number]   YOB: 1950   Date of Visit: 6/26/2017       Dear Dr. Jovanna Omalley:    Thank you for referring Alin Burkett to me for evaluation. Attached you will find relevant portions of my assessment and plan of care.    If you have questions, please do not hesitate to call me. I look forward to following Alin Burkett along with you.    Sincerely,    Shannon Kelly, NP    Enclosure  CC:  No Recipients    If you would like to receive this communication electronically, please contact [email or web address] or [phone] to request more information on [employer] access.    For providers and/or their staff who would like to refer a patient to [facility], please contact us through our one-stop-shop provider referral line, [facility], at [phone].    If you feel you have received this communication in error or would no longer like to receive these types of communications, please e-mail [email or web address]

## 2017-06-26 NOTE — PATIENT INSTRUCTIONS
Cystoscopy    Cystoscopy is a procedure that lets your doctor look directly inside your urethra and bladder. It can be used to:  · Help diagnose a problem with your urethra, bladder, or kidneys.  · Take a sample (biopsy) of bladder or urethral tissue.  · Treat certain problems (such as removing kidney stones).  · Place a stent to bypass an obstruction.  · Take special X-rays of the kidneys.  Based on the findings, your doctor may recommend other tests or treatments.  What is a cystoscope?  A cystoscope is a telescope-like instrument that contains lenses and fiberoptics (small glass wires that make bright light). The cystoscope may be straight and rigid, or flexible to bend around curves in the urethra. The doctor may look directly into the cystoscope, or project the image onto a monitor.  Getting ready  · Ask your doctor if you should stop taking any medications prior to the procedure.  · Ask whether you should avoid eating or drinking anything after midnight before the procedure.  · Follow any other instructions your doctor gives you.  Tell your doctor before the exam if you:  · Take any medications, such as aspirin or blood thinners  · Have allergies to any medications  · Are pregnant   The procedure  Cystoscopy is done in the doctors office or hospital. The doctor and a nurse are present during the procedure. It takes only a few minutes, longer if a biopsy, X-ray, or treatment needs to be done.  During the procedure:  · You lie on an exam table on your back, knees bent and legs apart. You are covered with a drape.  · Your urethra and the area around it are washed. Anesthetic jelly may be applied to numb the urethra. Other pain medication is usually not needed. In some cases, you may be offered a mild sedative to help you relax. If a more extensive procedure is to be done, such as a biopsy or kidney stone removal, general anesthesia may be needed.  · The cystoscope is inserted. A sterile fluid is put into the  bladder to expand it. You may feel pressure from this fluid.  · When the procedure is done, the cystoscope is removed.  After the procedure  If you had a sedative, general anesthesia, or spinal anesthesia, you must have someone drive you home. Once youre home:  · Drink plenty of fluids.  · You may have burning or light bleeding when you urinate--this is normal.  · Medications may be prescribed to ease any discomfort or prevent infection. Take these as directed.  · Call your doctor if you have heavy bleeding or blood clots, burning that lasts more than a day, a fever over 100°F  (38° C), or trouble urinating.  Date Last Reviewed: 9/8/2014  © 7453-1391 [employer]. [address] [place] [address], [place] [address]. All rights reserved. This information is not intended as a substitute for professional medical care. Always follow your healthcare professional's instructions.        Urinary Tract Infections in Men    Urinary tract infections (UTIs) are most often caused by bacteria (germs) that invade the urinary tract. The bacteria may come from outside the body. Or they may travel from the skin outside of rectum into the urethra. Pain in or around the urinary tract is a common symptom for most UTIs. But the only way to know for sure if you have a UTI is to have a urinalysis and urine culture.   Types of UTIs  · Cystitis: This is a bladder infection and is often linked to a blockage from an enlarged prostate. You may have an urgent or frequent need to urinate, and bloody urine. Treatment includes antibiotics and medications to relax or shrink the prostate. Sometimes, surgery is needed.  · Urethritis: This is an infection of the urethra. You may have a discharge from the urethra or burning when you urinate. You may also have pain in the urethra or penis. It is treated with antibiotics.  · Prostatitis: This is an inflammation or infection of the prostate. You may have an urgent or frequent need to urinate, fever, or  burning when you urinate. Or you may have a tender prostate, or a vague feeling of pressure. Prostatitis is treated with a range of medications, depending on the cause.  · Pyelonephritis: This is a kidney infection. If not treated, it can be serious and damage your kidneys. In severe cases you may be hospitalized. You may have a fever and upper back pain.  Treating a UTI  · Medications: Most UTIs are treated with antibiotics. These kill the bacteria. The length of time you need to take them depends on the type of infection. Take antibiotics exactly as directed until all of the medication is gone. If you don't, the infection may not go away and may become harder to treat. For certain types of UTIs, you may be given other medications to help treat your symptoms.  · Lifestyle changes: The lifestyle changes below will help get rid of your current infection. They may also help prevent future UTIs.  ¨ Drink plenty of fluids such as water, juice, or other caffeine-free drinks. This helps flush bacteria out of your system.  ¨ Empty your bladder when you feel the urge to urinate and before going to sleep. Urine that stays in your bladder promotes infection.  ¨ Use condoms during sex. These help prevent UTIs caused by sexually transmitted bacteria.  ¨ Keep follow-up appointments with your health care provider. He or she can may do tests to make sure the infection has cleared. If necessary, additional treatment can be started.  · Other treatment: Most UTIs respond to medication. But sometimes a procedure or surgery is needed. This can treat an enlarged prostate, or remove a kidney stone or other blockage. Surgery may also treat problems caused by scarring or long-term infections.  Date Last Reviewed: 9/8/2014 © 2000-2016 [employer]. [address] [place] [address], [place] [address]. All rights reserved. This information is not intended as a substitute for professional medical care. Always follow your healthcare  professional's instructions.

## 2017-06-28 LAB — BACTERIA UR CULT: NORMAL

## 2017-06-29 ENCOUNTER — TELEPHONE (OUTPATIENT)
Dept: UROLOGY | Facility: CLINIC | Age: 67
End: 2017-06-29

## 2017-06-29 DIAGNOSIS — N39.0 URINARY TRACT INFECTION WITHOUT HEMATURIA, SITE UNSPECIFIED: Primary | ICD-10-CM

## 2017-06-29 DIAGNOSIS — R30.0 DYSURIA: ICD-10-CM

## 2017-06-29 RX ORDER — CEFPODOXIME PROXETIL 100 MG/1
100 TABLET, FILM COATED ORAL 2 TIMES DAILY
Qty: 14 TABLET | Refills: 0 | Status: SHIPPED | OUTPATIENT
Start: 2017-06-29 | End: 2017-07-06

## 2017-07-05 ENCOUNTER — TELEPHONE (OUTPATIENT)
Dept: UROLOGY | Facility: CLINIC | Age: 67
End: 2017-07-05

## 2017-07-05 NOTE — TELEPHONE ENCOUNTER
7/6/17 Called to notify him that Dr. Mann wanted to perform cysto urethral dilation with possible DVIU under general anesthesia. He will be out of town 7/9- 7/16. Verbalized understanding.       7/5/17  Called to notify him that Dr. Mann wanted to perform cysto urethral dilation with possible DVIU under general anesthesia. Left VM for him to return my call.

## 2017-07-06 ENCOUNTER — TELEPHONE (OUTPATIENT)
Dept: UROLOGY | Facility: CLINIC | Age: 67
End: 2017-07-06

## 2017-07-06 ENCOUNTER — LAB VISIT (OUTPATIENT)
Dept: LAB | Facility: HOSPITAL | Age: 67
End: 2017-07-06
Attending: INTERNAL MEDICINE
Payer: MEDICARE

## 2017-07-06 DIAGNOSIS — Z79.899 ENCOUNTER FOR LONG-TERM (CURRENT) USE OF OTHER MEDICATIONS: ICD-10-CM

## 2017-07-06 DIAGNOSIS — N35.9 URETHRAL STRICTURE, UNSPECIFIED STRICTURE TYPE: Primary | ICD-10-CM

## 2017-07-06 DIAGNOSIS — Z94.0 KIDNEY REPLACED BY TRANSPLANT: ICD-10-CM

## 2017-07-06 DIAGNOSIS — Z94.0 STATUS POST KIDNEY TRANSPLANT: ICD-10-CM

## 2017-07-06 LAB
ALBUMIN SERPL BCP-MCNC: 3.4 G/DL
ANION GAP SERPL CALC-SCNC: 8 MMOL/L
BASOPHILS # BLD AUTO: 0.01 K/UL
BASOPHILS NFR BLD: 0.2 %
BUN SERPL-MCNC: 22 MG/DL
CALCIUM SERPL-MCNC: 8.7 MG/DL
CHLORIDE SERPL-SCNC: 111 MMOL/L
CO2 SERPL-SCNC: 24 MMOL/L
CREAT SERPL-MCNC: 1.5 MG/DL
DIFFERENTIAL METHOD: ABNORMAL
EOSINOPHIL # BLD AUTO: 0.1 K/UL
EOSINOPHIL NFR BLD: 2.2 %
ERYTHROCYTE [DISTWIDTH] IN BLOOD BY AUTOMATED COUNT: 13.6 %
EST. GFR  (AFRICAN AMERICAN): 55.3 ML/MIN/1.73 M^2
EST. GFR  (NON AFRICAN AMERICAN): 47.8 ML/MIN/1.73 M^2
GLUCOSE SERPL-MCNC: 86 MG/DL
HCT VFR BLD AUTO: 36.6 %
HGB BLD-MCNC: 12.2 G/DL
LYMPHOCYTES # BLD AUTO: 0.9 K/UL
LYMPHOCYTES NFR BLD: 18.8 %
MAGNESIUM SERPL-MCNC: 1.9 MG/DL
MCH RBC QN AUTO: 31.5 PG
MCHC RBC AUTO-ENTMCNC: 33.3 %
MCV RBC AUTO: 95 FL
MONOCYTES # BLD AUTO: 0.4 K/UL
MONOCYTES NFR BLD: 9.6 %
NEUTROPHILS # BLD AUTO: 3.2 K/UL
NEUTROPHILS NFR BLD: 68.8 %
PHOSPHATE SERPL-MCNC: 3.9 MG/DL
PLATELET # BLD AUTO: 115 K/UL
PMV BLD AUTO: 11.3 FL
POTASSIUM SERPL-SCNC: 3.2 MMOL/L
RBC # BLD AUTO: 3.87 M/UL
SODIUM SERPL-SCNC: 143 MMOL/L
WBC # BLD AUTO: 4.58 K/UL

## 2017-07-06 PROCEDURE — 80069 RENAL FUNCTION PANEL: CPT

## 2017-07-06 PROCEDURE — 80197 ASSAY OF TACROLIMUS: CPT

## 2017-07-06 PROCEDURE — 36415 COLL VENOUS BLD VENIPUNCTURE: CPT | Mod: PO

## 2017-07-06 PROCEDURE — 83735 ASSAY OF MAGNESIUM: CPT

## 2017-07-06 PROCEDURE — 85025 COMPLETE CBC W/AUTO DIFF WBC: CPT

## 2017-07-07 LAB
CMV DNA SERPL NAA+PROBE-ACNC: NORMAL IU/ML
TACROLIMUS BLD-MCNC: 8.8 NG/ML

## 2017-07-10 ENCOUNTER — PATIENT MESSAGE (OUTPATIENT)
Dept: TRANSPLANT | Facility: CLINIC | Age: 67
End: 2017-07-10

## 2017-07-10 DIAGNOSIS — N39.0 URINARY TRACT INFECTION WITHOUT HEMATURIA, SITE UNSPECIFIED: Primary | ICD-10-CM

## 2017-07-10 RX ORDER — LINEZOLID 600 MG/1
600 TABLET, FILM COATED ORAL EVERY 12 HOURS
Qty: 20 TABLET | Refills: 0 | Status: ON HOLD | OUTPATIENT
Start: 2017-07-10 | End: 2017-07-19

## 2017-07-10 NOTE — TELEPHONE ENCOUNTER
Notified patient of Dr. Mcclain's review of 7/6/17 urine culture results. Instructed patient to start linezolid 600 mg BID X 10 days. Informed patient that he will also be referred to infectious disease due to recurrent UTIs. Patient verbalized understanding.

## 2017-07-10 NOTE — TELEPHONE ENCOUNTER
----- Message from Chasidy Mcclain MD sent at 7/9/2017  3:22 PM CDT -----  Patient has VRE UTI. Please ID consult (Dr. Delgado). Start linezolid 600 mg BID X 10 days. Patient was supposed to have cystoscopy last week, did he get it done?

## 2017-07-12 ENCOUNTER — ANESTHESIA EVENT (OUTPATIENT)
Dept: SURGERY | Facility: HOSPITAL | Age: 67
End: 2017-07-12
Payer: MEDICARE

## 2017-07-12 NOTE — PRE ADMISSION SCREENING
Anesthesia Assessment: Preoperative EQUATION    Planned Procedure: Procedure(s) (LRB):  CYSTOSCOPY (N/A)  DILATION-URETHRA (N/A)  URETHROTOMY-DIRECT VISUAL INTERNAL (DVIU) (N/A)  Requested Anesthesia Type:General  Surgeon: Dewey Mann MD  Service: Urology  Known or anticipated Date of Surgery:7/19/2017    Surgeon notes: reviewed    Electronic QUestionnaire Assessment completed via nurse interview with patient.        NO AQ    Triage considerations:     The patient has no apparent active cardiac condition (No unstable coronary Syndrome such as severe unstable angina or recent [<1 month] myocardial infarction, decompensated CHF, severe valvular   disease or significant arrhythmia)    Previous anesthesia records:GETA, MAC and No problems Kidney Transplant- 11/26/16; Placement Time: 1439; Method of Intubation: Direct laryngoscopy; Inserted by: CRNA; Airway Device: Endotracheal Tube; Mask Ventilation: Easy; Intubated: Postinduction; Blade: Dotson #2; Airway Device Size: 8.0; Style: Cuffed; Cuff Inflation: Minimal occlusive pressure; Inflation Amount: 6; Placement Verified By: Auscultation, Capnometry; Grade: Grade I; Complicating Factors: None; Intubation Findings: Positive EtCO2, Bilateral breath sounds, Atraumatic/Condition of teeth unchanged;  Depth of Insertion: 26; Securment: Lips; Complications: None; Breath Sounds: Equal Bilateral; Insertion Attempts: 1; Removal Date: 11/26/16;   Airway/Jaw/Neck:  Airway Findings: Mouth Opening: Normal Tongue: Normal  General Airway Assessment: Adult  Mallampati: II  Improves to II with phonation.  TM Distance: Normal, at least 6 cm      Dental:  Dental Findings: In tact     Last PCP note: within 3 months , within [facility]  Transplant MD 5/22/17  Subspecialty notes: Kidney Transplant    Other important co-morbidities: S/P Kidney transplant d/t HTN 11/2016, HTN, GERD, HX anemia, Hyperparathyroidism, hypothyroidism     Tests already available:  Available tests,  within 1 month ,  within [facility] . 7/6/17 renal fxn, CBC, tacro level. 9/2016 echo, 11/2016 EKG            Instructions given. (See in Nurse's note)    Optimization:  Anesthesia Preop Clinic Assessment  Indicated-not required for this procedure    Medical Opinion Indicated-will ask transplant for ok          Plan:    Testing:  none     Consultation:will ask transplant ok for surgery?     Patient  has previously scheduled Medical Appointment:none    Navigation:           Consults scheduled.             Results will be tracked by Preop Clinic.

## 2017-07-12 NOTE — ANESTHESIA PREPROCEDURE EVALUATION
Pre Admission Screening  Jess Freed RN      []Hide copied text  Anesthesia Assessment: Preoperative EQUATION     Planned Procedure: Procedure(s) (LRB):  CYSTOSCOPY (N/A)  DILATION-URETHRA (N/A)  URETHROTOMY-DIRECT VISUAL INTERNAL (DVIU) (N/A)  Requested Anesthesia Type:General  Surgeon: Dewey Mann MD  Service: Urology  Known or anticipated Date of Surgery:7/19/2017     Surgeon notes: reviewed     Electronic QUestionnaire Assessment completed via nurse interview with patient.         NO AQ     Triage considerations:      The patient has no apparent active cardiac condition (No unstable coronary Syndrome such as severe unstable angina or recent [<1 month] myocardial infarction, decompensated CHF, severe valvular   disease or significant arrhythmia)     Previous anesthesia records:GETA, MAC and No problems Kidney Transplant- 11/26/16; Placement Time: 1439; Method of Intubation: Direct laryngoscopy; Inserted by: CRNA; Airway Device: Endotracheal Tube; Mask Ventilation: Easy; Intubated: Postinduction; Blade: Dotson #2; Airway Device Size: 8.0; Style: Cuffed; Cuff Inflation: Minimal occlusive pressure; Inflation Amount: 6; Placement Verified By: Auscultation, Capnometry; Grade: Grade I; Complicating Factors: None; Intubation Findings: Positive EtCO2, Bilateral breath sounds, Atraumatic/Condition of teeth unchanged;  Depth of Insertion: 26; Securment: Lips; Complications: None; Breath Sounds: Equal Bilateral; Insertion Attempts: 1; Removal Date: 11/26/16;   Airway/Jaw/Neck:  Airway Findings: Mouth Opening: Normal Tongue: Normal  General Airway Assessment: Adult  Mallampati: II  Improves to II with phonation.  TM Distance: Normal, at least 6 cm      Dental:  Dental Findings: In tact      Last PCP note: within 3 months , within [facility]  Transplant MD 5/22/17  Subspecialty notes: Kidney Transplant     Other important co-morbidities: S/P Kidney transplant d/t HTN 11/2016, HTN, GERD, HX anemia, Hyperparathyroidism,  hypothyroidism     Tests already available:  Available tests,  within 1 month , within [facility] . 7/6/17 renal fxn, CBC, tacro level. 9/2016 echo, 11/2016 EKG                                                Instructions given. (See in Nurse's note)     Optimization:  Anesthesia Preop Clinic Assessment  Indicated-not required for this procedure    Medical Opinion Indicated-will ask transplant for ok                                                                      Plan:              Testing:  none                                               Consultation:will ask transplant ok for surgery?                                               Patient  has previously scheduled Medical Appointment:none     Navigation:                        Consults scheduled.                                  Results will be tracked by Preop Clinic.                                          Electronically signed by Jess Freed RN at 7/12/2017 11:45 AM        Pre-admit on 7/19/2017            Detailed Report    7/13/17   From: Chasidy Mcclain MD   Sent: 7/12/2017   4:20 PM   To: Lisa Olea RN     We need to repeat his labs on Friday (his K was recently low) and inform Dr. Mann about the patient's recent UTI, on antibiotic.        7/17/17 K ordered for am of surgery                                                                                                           07/12/2017  Alin Burkett is a 67 y.o., male.    Anesthesia Evaluation    I have reviewed the Patient Summary Reports.        Review of Systems  Anesthesia Hx:  No problems with previous Anesthesia  History of prior surgery of interest to airway management or planning: Previous anesthesia: General 11/2016 kidney transplant with general anesthesia.  Procedure performed at an [facility]. Denies Family Hx of Anesthesia complications.   Denies Personal Hx of Anesthesia complications.   Social:  Non-Smoker    Hematology/Oncology:     Oncology Normal    --  Anemia:   EENT/Dental:EENT/Dental Normal   Cardiovascular:   Hypertension, well controlled  Denies Angina.  Abdominal Aortic Aneurysm, s/p surgical repair    Pulmonary:   Shortness of breath Recent URI    Renal/:  Renal Symptoms/Infections/Stones:  Urinary Tract Infection (UTI) Urethral stricture Kidney Function/Disease S/P Kidney Transplant, functioning, stable.   Hepatic/GI:   GERD, well controlled    Endocrine:  Thyroid Disease Hypothyroidism  Parathyroid Disease, Hyperparathyroidism        Physical Exam  General:  Well nourished    Airway/Jaw/Neck:  Airway Findings: Mouth Opening: Normal General Airway Assessment: Adult  Mallampati: I  TM Distance: Normal, at least 6 cm  Jaw/Neck Findings:  Neck ROM: Normal ROM       Chest/Lungs:  Chest/Lungs Findings: Clear to auscultation     Heart/Vascular:  Heart Findings: Rate: Normal  Rhythm: Regular Rhythm        Mental Status:  Mental Status Findings:  Cooperative, Alert and Oriented         Anesthesia Plan  Type of Anesthesia, risks & benefits discussed:  Anesthesia Type:  general  Patient's Preference:   Intra-op Monitoring Plan:   Intra-op Monitoring Plan Comments:   Post Op Pain Control Plan:   Post Op Pain Control Plan Comments:   Induction:    Beta Blocker:  Patient is not currently on a Beta-Blocker (No further documentation required).       Informed Consent: Patient understands risks and agrees with Anesthesia plan.  Questions answered. Anesthesia consent signed with patient.  ASA Score: 3     Day of Surgery Review of History & Physical:            Ready For Surgery From Anesthesia Perspective.

## 2017-07-13 ENCOUNTER — TELEPHONE (OUTPATIENT)
Dept: UROLOGY | Facility: CLINIC | Age: 67
End: 2017-07-13

## 2017-07-17 DIAGNOSIS — Z01.818 PREOPERATIVE TESTING: Primary | ICD-10-CM

## 2017-07-18 ENCOUNTER — TELEPHONE (OUTPATIENT)
Dept: UROLOGY | Facility: CLINIC | Age: 67
End: 2017-07-18

## 2017-07-18 NOTE — TELEPHONE ENCOUNTER
Called pt to confirm 11:45am arrival time for procedure. Gave pt NPO instructions and gave pt opportunity to ask questions. Pt verbalized understanding.

## 2017-07-19 ENCOUNTER — ANESTHESIA (OUTPATIENT)
Dept: SURGERY | Facility: HOSPITAL | Age: 67
End: 2017-07-19
Payer: MEDICARE

## 2017-07-19 ENCOUNTER — HOSPITAL ENCOUNTER (OUTPATIENT)
Facility: HOSPITAL | Age: 67
Discharge: HOME | End: 2017-07-19
Attending: UROLOGY | Admitting: UROLOGY
Payer: MEDICARE

## 2017-07-19 ENCOUNTER — SURGERY (OUTPATIENT)
Age: 67
End: 2017-07-19

## 2017-07-19 VITALS
HEART RATE: 65 BPM | BODY MASS INDEX: 20.02 KG/M2 | RESPIRATION RATE: 18 BRPM | TEMPERATURE: 99 F | HEIGHT: 71 IN | SYSTOLIC BLOOD PRESSURE: 123 MMHG | WEIGHT: 143 LBS | DIASTOLIC BLOOD PRESSURE: 76 MMHG | OXYGEN SATURATION: 100 %

## 2017-07-19 DIAGNOSIS — N35.919 URETHRAL STRICTURE: ICD-10-CM

## 2017-07-19 DIAGNOSIS — N35.9 URETHRAL STRICTURE, UNSPECIFIED STRICTURE TYPE: ICD-10-CM

## 2017-07-19 DIAGNOSIS — Z01.818 PREOPERATIVE TESTING: ICD-10-CM

## 2017-07-19 LAB — POTASSIUM SERPL-SCNC: 4.2 MMOL/L

## 2017-07-19 PROCEDURE — 63600175 PHARM REV CODE 636 W HCPCS: Performed by: REGISTERED NURSE

## 2017-07-19 PROCEDURE — D9220A PRA ANESTHESIA: Mod: CRNA,,, | Performed by: REGISTERED NURSE

## 2017-07-19 PROCEDURE — 87086 URINE CULTURE/COLONY COUNT: CPT

## 2017-07-19 PROCEDURE — 71000015 HC POSTOP RECOV 1ST HR: Performed by: UROLOGY

## 2017-07-19 PROCEDURE — 37000008 HC ANESTHESIA 1ST 15 MINUTES: Performed by: UROLOGY

## 2017-07-19 PROCEDURE — 37000009 HC ANESTHESIA EA ADD 15 MINS: Performed by: UROLOGY

## 2017-07-19 PROCEDURE — 36000707: Performed by: UROLOGY

## 2017-07-19 PROCEDURE — 52276 CYSTOSCOPY AND TREATMENT: CPT | Mod: GC,,, | Performed by: UROLOGY

## 2017-07-19 PROCEDURE — 87186 SC STD MICRODIL/AGAR DIL: CPT

## 2017-07-19 PROCEDURE — D9220A PRA ANESTHESIA: Mod: ANES,,, | Performed by: ANESTHESIOLOGY

## 2017-07-19 PROCEDURE — 25000003 PHARM REV CODE 250: Performed by: STUDENT IN AN ORGANIZED HEALTH CARE EDUCATION/TRAINING PROGRAM

## 2017-07-19 PROCEDURE — 71000033 HC RECOVERY, INTIAL HOUR: Performed by: UROLOGY

## 2017-07-19 PROCEDURE — 25000003 PHARM REV CODE 250: Performed by: REGISTERED NURSE

## 2017-07-19 PROCEDURE — 87088 URINE BACTERIA CULTURE: CPT

## 2017-07-19 PROCEDURE — C1769 GUIDE WIRE: HCPCS | Performed by: UROLOGY

## 2017-07-19 PROCEDURE — 87077 CULTURE AEROBIC IDENTIFY: CPT

## 2017-07-19 PROCEDURE — 36000706: Performed by: UROLOGY

## 2017-07-19 PROCEDURE — 84132 ASSAY OF SERUM POTASSIUM: CPT

## 2017-07-19 PROCEDURE — 27201423 OPTIME MED/SURG SUP & DEVICES STERILE SUPPLY: Performed by: UROLOGY

## 2017-07-19 PROCEDURE — 63600175 PHARM REV CODE 636 W HCPCS: Performed by: ANESTHESIOLOGY

## 2017-07-19 RX ORDER — LINEZOLID 600 MG/1
600 TABLET, FILM COATED ORAL EVERY 12 HOURS
Qty: 6 TABLET | Refills: 0 | Status: SHIPPED | OUTPATIENT
Start: 2017-07-19 | End: 2017-07-22

## 2017-07-19 RX ORDER — OXYCODONE AND ACETAMINOPHEN 5; 325 MG/1; MG/1
1 TABLET ORAL EVERY 4 HOURS PRN
Qty: 10 TABLET | Refills: 0 | Status: SHIPPED | OUTPATIENT
Start: 2017-07-19 | End: 2017-10-04

## 2017-07-19 RX ORDER — MIDAZOLAM HYDROCHLORIDE 1 MG/ML
INJECTION, SOLUTION INTRAMUSCULAR; INTRAVENOUS
Status: DISCONTINUED | OUTPATIENT
Start: 2017-07-19 | End: 2017-07-19

## 2017-07-19 RX ORDER — SODIUM CHLORIDE 9 MG/ML
INJECTION, SOLUTION INTRAVENOUS CONTINUOUS
Status: DISCONTINUED | OUTPATIENT
Start: 2017-07-19 | End: 2017-07-19 | Stop reason: HOSPADM

## 2017-07-19 RX ORDER — ONDANSETRON 8 MG/1
8 TABLET, ORALLY DISINTEGRATING ORAL EVERY 8 HOURS PRN
Status: DISCONTINUED | OUTPATIENT
Start: 2017-07-19 | End: 2017-07-19 | Stop reason: HOSPADM

## 2017-07-19 RX ORDER — POLYETHYLENE GLYCOL 3350 17 G/17G
17 POWDER, FOR SOLUTION ORAL DAILY
Qty: 30 PACKET | Refills: 0 | Status: SHIPPED | OUTPATIENT
Start: 2017-07-19 | End: 2017-07-28

## 2017-07-19 RX ORDER — FENTANYL CITRATE 50 UG/ML
INJECTION, SOLUTION INTRAMUSCULAR; INTRAVENOUS
Status: DISCONTINUED | OUTPATIENT
Start: 2017-07-19 | End: 2017-07-19

## 2017-07-19 RX ORDER — HYDROCODONE BITARTRATE AND ACETAMINOPHEN 5; 325 MG/1; MG/1
1 TABLET ORAL EVERY 4 HOURS PRN
Status: DISCONTINUED | OUTPATIENT
Start: 2017-07-19 | End: 2017-07-19 | Stop reason: HOSPADM

## 2017-07-19 RX ORDER — PROPOFOL 10 MG/ML
VIAL (ML) INTRAVENOUS
Status: DISCONTINUED | OUTPATIENT
Start: 2017-07-19 | End: 2017-07-19

## 2017-07-19 RX ORDER — LIDOCAINE HCL/PF 100 MG/5ML
SYRINGE (ML) INTRAVENOUS
Status: DISCONTINUED | OUTPATIENT
Start: 2017-07-19 | End: 2017-07-19

## 2017-07-19 RX ADMIN — FENTANYL CITRATE 100 MCG: 50 INJECTION, SOLUTION INTRAMUSCULAR; INTRAVENOUS at 03:07

## 2017-07-19 RX ADMIN — HYDROCODONE BITARTRATE AND ACETAMINOPHEN 1 TABLET: 5; 325 TABLET ORAL at 03:07

## 2017-07-19 RX ADMIN — PROPOFOL 150 MG: 10 INJECTION, EMULSION INTRAVENOUS at 03:07

## 2017-07-19 RX ADMIN — MIDAZOLAM HYDROCHLORIDE 2 MG: 1 INJECTION, SOLUTION INTRAMUSCULAR; INTRAVENOUS at 03:07

## 2017-07-19 RX ADMIN — LIDOCAINE HYDROCHLORIDE 100 MG: 20 INJECTION, SOLUTION INTRAVENOUS at 03:07

## 2017-07-19 RX ADMIN — LINEZOLID 600 MG: 200 INJECTION, SOLUTION INTRAVENOUS at 02:07

## 2017-07-19 NOTE — OP NOTE
[facility]  Operative Note    Date: 2017    Pre-Op Diagnosis: urethral stricture  Patient Active Problem List   Diagnosis    Adrenal adenoma    Calcium nephrolithiasis    Ectopic kidney    Essential hypertension    Congenital absence of kidney    Anemia of chronic disease    Basal cell carcinoma - of the nose    History of AAA (abdominal aortic aneurysm) repair,     Secondary hyperparathyroidism, renal    Metabolic acidosis    Hypokalemia    Chronic diarrhea    Hypothyroidism    Plantar warts    Left lateral abdominal pain    Arrhythmia, onset     History of smoking 10-25 pack years, quit , 20 pack-years    History of alcohol abuse, quit     Abnormal ECG    VPC (ventricular premature complex)    Body mass index (BMI) of 20.0-20.9 in adult    LOUISA (obstructive sleep apnea), CPAP refused    LVH (left ventricular hypertrophy) due to hypertensive disease    Chest pain    Elevated troponin I level    BPH (benign prostatic hyperplasia)    Prophylactic immunotherapy (transplant immunosuppression)    Long-term use of immunosuppressant medication    DIVINE (acute kidney injury)    -donor kidney transplant 16    Acute vascular rejection of kidney transplant 17    S/P kidney transplant    Hypophosphatemia    Complication of kidney transplant    CKD (chronic kidney disease) stage 2, GFR 60-89 ml/min    Urethral stricture       Post-Op Diagnosis: same    Procedure(s) Performed:   1.  Cystoscopy with DVIU  2.  Fluoro < 1 hour    Specimen(s): urine for culture    Staff Surgeon: Dewey Mann MD    Assistant Surgeon: Basia Jackson MD    Anesthesia: General endotracheal anesthesia    Indications: Alin Burkett is a 67 y.o. male with urethral stricture presenting for endoscopic management.      Findings:  1. tight stricture in anterior urethra  2. Bladder diverticulum on left lateral wall    Estimated Blood Loss: min    Drains: 20 Fr fournier  catheter    Procedure in Detail:  After risks, benefits and possible complications of cystoscopy were explained, the patient elected to undergo the procedure and informed consent was obtained. All questions were answered in the ingrid-operative area. The patient was transferred to the cystoscopy suite and placed in the supine position.  SCDs were applied and working.  Anesthesia was administered.  Once the patient was adequately sedated, he was placed in the dorsal lithotomy position and prepped and draped in the usual sterile fashion.  Time out was performed, ingrid-procedural antibiotics were confirmed.     A rigid cystoscope in a 22 Fr sheath was inserted into the urethra. There was a stricture in the mid penile urethra. A super stiff wire was passed through the scope through the stricture and into the bladder. Position confirmed with fluoro. The scope was removed.    A urethrotome in a 22 Fr sheath was introduced into the urethra. The urethrotome was used to cut at the 12 o'clock position until bleeding was visualized.      The scope was then passed through the rest of the urethra and into the bladder.  There was a lot of debris present in the bladder. The urine was drained and sent for culture. There was a large diverticulum on the left lateral wall. The remainder of the bladder appeared hyperemic. The scope was removed.    A 20 fr Douglas tip fournier was advance over the wire and into the bladder. The wire was removed. 10 cc were placed in the balloon.     The patient was removed from lithotomy.     The patient tolerated the procedure well and was transferred to recovery in stable condition.    Disposition:  The patient will follow up with Dr. Mann in 3 months.  The patient was given prescriptions for linezolid, percocet, miralax. He will remove his catheter in 4 days.       Basia Jackson MD

## 2017-07-19 NOTE — PLAN OF CARE
Problem: Patient Care Overview  Goal: Plan of Care Review  Outcome: Outcome(s) achieved Date Met: 07/19/17    Discharge instructions and prescription given to patient and daughter. Verbalized understanding. Patient stable, tolerating fluids. No complaints at this time.  Dr. Mann at bedside explaining results of procedure to patient and daughter. All questions answered.  Patient adequate for discharge.

## 2017-07-19 NOTE — DISCHARGE INSTRUCTIONS
You may remove your catheter on Sunday 7/23, in the morning.         Cystoscopy    Cystoscopy is a procedure that lets your doctor look directly inside your urethra and bladder. It can be used to:  · Help diagnose a problem with your urethra, bladder, or kidneys.  · Take a sample (biopsy) of bladder or urethral tissue.  · Treat certain problems (such as removing kidney stones).  · Place a stent to bypass an obstruction.  · Take special X-rays of the kidneys.  Based on the findings, your doctor may recommend other tests or treatments.  What is a cystoscope?  A cystoscope is a telescope-like instrument that contains lenses and fiberoptics (small glass wires that make bright light). The cystoscope may be straight and rigid, or flexible to bend around curves in the urethra. The doctor may look directly into the cystoscope, or project the image onto a monitor.  Getting ready  · Ask your doctor if you should stop taking any medications prior to the procedure.  · Ask whether you should avoid eating or drinking anything after midnight before the procedure.  · Follow any other instructions your doctor gives you.  Tell your doctor before the exam if you:  · Take any medications, such as aspirin or blood thinners  · Have allergies to any medications  · Are pregnant   The procedure  Cystoscopy is done in the doctors office or hospital. The doctor and a nurse are present during the procedure. It takes only a few minutes, longer if a biopsy, X-ray, or treatment needs to be done.  During the procedure:  · You lie on an exam table on your back, knees bent and legs apart. You are covered with a drape.  · Your urethra and the area around it are washed. Anesthetic jelly may be applied to numb the urethra. Other pain medication is usually not needed. In some cases, you may be offered a mild sedative to help you relax. If a more extensive procedure is to be done, such as a biopsy or kidney stone removal, general anesthesia may be  needed.  · The cystoscope is inserted. A sterile fluid is put into the bladder to expand it. You may feel pressure from this fluid.  · When the procedure is done, the cystoscope is removed.  After the procedure  If you had a sedative, general anesthesia, or spinal anesthesia, you must have someone drive you home. Once youre home:  · Drink plenty of fluids.  · You may have burning or light bleeding when you urinate--this is normal.  · Medications may be prescribed to ease any discomfort or prevent infection. Take these as directed.  · Call your doctor if you have heavy bleeding or blood clots, burning that lasts more than a day, a fever over 100°F  (38° C), or trouble urinating.          PATIENT EDUCATION FOR CATHETER CARE:  1. Wash hands before and after handling the catheter.  2. Wash around urinary opening daily, taking care to avoid pulling on the catheter during cleansing.  3. Drink 8-12 glasses of fluids daily; increase fluid intake if urine becomes dark and concentrated.  4. Wipe all connecting junctions with alcohol or betadine before changing from leg-bag drainage to  overnight bag drainage.  5. Keep the drainage bag at a lower level than the bladder; do not place the bag on your chair.  6. Avoid letting the bag lay on its side as urine may flow back into the drainage tube.  7. Usually the catheter is not changed except when blocked or a malfunction occurs.  **MALE**  If you are uncircumcised, pull skin back over glans (head) of penis. Cleanse from the catheter outward to  include entire glans. After cleansing return foreskin to its normal position. Continue with cleansing the rest  of your genitalia, without returning to this glans area.  BATHING: You may take a shower. Do not soak in a tub.

## 2017-07-19 NOTE — ANESTHESIA POSTPROCEDURE EVALUATION
"Anesthesia Post Evaluation    Patient: Alin Burkett    Procedure(s) Performed: Procedure(s) (LRB):  CYSTOSCOPY (N/A)  URETHROTOMY-DIRECT VISUAL INTERNAL (DVIU) (N/A)    Final Anesthesia Type: general  Patient location during evaluation: PACU  Patient participation: Yes- Able to Participate  Level of consciousness: awake and alert  Post-procedure vital signs: reviewed and stable  Pain management: adequate  Airway patency: patent  PONV status at discharge: No PONV  Anesthetic complications: no      Cardiovascular status: stable  Respiratory status: unassisted, spontaneous ventilation and room air  Hydration status: euvolemic  Follow-up not needed.        Visit Vitals  /73   Pulse 70   Temp 36.6 °C (97.9 °F) (Temporal)   Resp 18   Ht 5' 11" (1.803 m)   Wt 64.9 kg (143 lb)   SpO2 100%   BMI 19.94 kg/m²       Pain/Raheem Score: Pain Rating Prior to Med Admin: 6 (burning to penis) (7/19/2017  3:58 PM)      "

## 2017-07-19 NOTE — DISCHARGE SUMMARY
[facility]  Discharge Note  Short Stay    Admit Date: 7/19/2017    Discharge Date and Time: 07/19/2017    Attending Physician: Dewey Mann MD     Discharge Provider: Basia Jackson    Diagnoses:  Active Hospital Problems    Diagnosis  POA    *Urethral stricture [N35.9]  Yes    CKD (chronic kidney disease) stage 2, GFR 60-89 ml/min [N18.2]  Yes     Chronic    S/P kidney transplant [Z94.0]  Not Applicable    Acute vascular rejection of kidney transplant 1/6/17 [T86.11]  Yes    Long-term use of immunosuppressant medication [Z79.899]  Not Applicable    BPH (benign prostatic hyperplasia) [N40.0]  Yes     Chronic     s/p TURP with urethral stricture      LVH (left ventricular hypertrophy) due to hypertensive disease [I11.9]  Yes    Body mass index (BMI) of 20.0-20.9 in adult [Z68.20]  Not Applicable    VPC (ventricular premature complex) [I49.3]  Yes    History of smoking 10-25 pack years, quit 2010, 20 pack-years [Z87.891]  Not Applicable    Anemia of chronic disease [D63.8]  Yes    Congenital absence of kidney [Q60.2]  Not Applicable     left      Essential hypertension [I10]  Yes      Resolved Hospital Problems    Diagnosis Date Resolved POA   No resolved problems to display.       Discharged Condition: good    Hospital Course: Patient was admitted for an outpatient procedure and tolerated the procedure well with no complications.    Final Diagnoses: Same as principal problem.    Disposition: Home or Self Care    Follow up/Patient Instructions:    Medications:  Reconciled Home Medications:   Current Discharge Medication List      START taking these medications    Details   oxycodone-acetaminophen (PERCOCET) 5-325 mg per tablet Take 1 tablet by mouth every 4 (four) hours as needed for Pain.  Qty: 10 tablet, Refills: 0      polyethylene glycol (GLYCOLAX) 17 gram PwPk Take 17 g by mouth once daily.  Qty: 30 packet, Refills: 0         CONTINUE these medications which have CHANGED    Details    linezolid (ZYVOX) 600 mg Tab Take 1 tablet (600 mg total) by mouth every 12 (twelve) hours.  Qty: 6 tablet, Refills: 0         CONTINUE these medications which have NOT CHANGED    Details   famotidine (PEPCID) 20 MG tablet Take 1 tablet (20 mg total) by mouth every evening.  Qty: 30 tablet, Refills: 11      k phos di & mono-sod phos mono (K-PHOS-NEUTRAL) 250 mg Tab Take 4 tablets by mouth 4 (four) times daily.  Qty: 480 tablet, Refills: 11      ketoconazole (NIZORAL) 200 mg Tab Take 0.5 tablets (100 mg total) by mouth once daily.  Qty: 15 tablet, Refills: 5      levothyroxine (SYNTHROID) 112 MCG tablet Take 1 tablet (112 mcg total) by mouth once daily.  Qty: 30 tablet, Refills: 6      magnesium oxide (MAG-OX) 400 mg tablet Take 1 tablet (400 mg total) by mouth once daily.  Qty: 30 tablet, Refills: 11      metoprolol tartrate (LOPRESSOR) 25 MG tablet Take 0.5 tablets (12.5 mg total) by mouth 2 (two) times daily.  Qty: 30 tablet, Refills: 11      multivitamin (ONE DAILY MULTIVITAMIN) per tablet Take 1 tablet by mouth once daily.      mycophenolate (CELLCEPT) 250 mg Cap Take 3 capsules (750 mg total) by mouth 2 (two) times daily. Z94.0/Kidney Transplant on 11/26/16  Qty: 180 capsule, Refills: 11    Comments: DOSE DECREASE; Kidney Transplant z94.0; 11/26/16      predniSONE (DELTASONE) 5 MG tablet Take 1 tablet (5 mg total) by mouth once daily. Z94.0/Kidney transplant on 11/26/2016  Qty: 30 tablet, Refills: 11      sodium bicarbonate 650 MG tablet Take 3 tablets (1,950 mg total) by mouth 2 (two) times daily.  Qty: 180 tablet, Refills: 11    Comments: DOSE INCREASE      sulfamethoxazole-trimethoprim 400-80mg (BACTRIM,SEPTRA) 400-80 mg per tablet Take 1 tablet by mouth once daily. Stop: 11/26/17  Qty: 30 tablet, Refills: 11    Comments: DOSE INCREASE; Kidney Transplant z94.0; 11/26/16      tacrolimus (PROGRAF) 1 MG Cap Take 5 capsules (5mg total) in AM & 4 capsules (4mg total) in PM by mouth daily. Z94.0/Kidney  Transplant on 11/26/16  Qty: 270 capsule, Refills: 11    Comments: DOSE DECREASE; Z94.0/Kidney Transplant on 11/26/16             Discharge Procedure Orders  Diet general     Activity as tolerated     Call MD for:  temperature >100.4     Call MD for:  persistent nausea and vomiting     Call MD for:  severe uncontrolled pain     Call MD for:   Order Comments: Barker stops draining     No dressing needed       Follow-up Information     Dewey Mann MD In 3 months.    Specialty:  Urology  Why:  with cysto  Contact information:  [address] [place], [place] [address]ERSON [facility] [id number] [phone]                     Basia Jackson MD  Urology, PGY-3  Pager# [phone]

## 2017-07-19 NOTE — ANESTHESIA RELEASE NOTE
"Anesthesia Release from PACU Note    Patient: Alin Burkett    Procedure(s) Performed: Procedure(s) (LRB):  CYSTOSCOPY (N/A)  URETHROTOMY-DIRECT VISUAL INTERNAL (DVIU) (N/A)    Anesthesia type: general    Post pain: Adequate analgesia    Post assessment: no apparent anesthetic complications    Last Vitals:   Visit Vitals  /73   Pulse 70   Temp 36.6 °C (97.9 °F) (Temporal)   Resp 18   Ht 5' 11" (1.803 m)   Wt 64.9 kg (143 lb)   SpO2 100%   BMI 19.94 kg/m²       Post vital signs: stable    Level of consciousness: awake    Nausea/Vomiting: no nausea/no vomiting    Complications: none    Airway Patency: patent    Respiratory: unassisted    Cardiovascular: stable and blood pressure at baseline    Hydration: euvolemic  "

## 2017-07-19 NOTE — TRANSFER OF CARE
"Anesthesia Transfer of Care Note    Patient: Alin Burkett    Procedure(s) Performed: Procedure(s) (LRB):  CYSTOSCOPY (N/A)  URETHROTOMY-DIRECT VISUAL INTERNAL (DVIU) (N/A)    Patient location: [facility]    Anesthesia Type: general    Transport from OR: Transported from OR on 6-10 L/min O2 by face mask with adequate spontaneous ventilation    Post pain: adequate analgesia    Post assessment: no apparent anesthetic complications    Post vital signs: stable    Level of consciousness: awake    Nausea/Vomiting: no nausea/vomiting    Complications: none    Transfer of care protocol was followed      Last vitals:   Visit Vitals  /79 (BP Location: Right arm, Patient Position: Lying, BP Method: Automatic)   Pulse 74   Temp 36.7 °C (98.1 °F) (Skin)   Resp 18   Ht 5' 11" (1.803 m)   Wt 64.9 kg (143 lb)   SpO2 100%   BMI 19.94 kg/m²     "

## 2017-07-20 ENCOUNTER — TELEPHONE (OUTPATIENT)
Dept: UROLOGY | Facility: CLINIC | Age: 67
End: 2017-07-20

## 2017-07-20 NOTE — TELEPHONE ENCOUNTER
----- Message from Mckenna Decker sent at 7/19/2017  4:50 PM CDT -----  Contact: Dr Johnathan Mann called and asked that pt be added to his schedule on 7/28/17 at 11 am for IC office visit. Please add pt.

## 2017-07-21 ENCOUNTER — TELEPHONE (OUTPATIENT)
Dept: UROLOGY | Facility: CLINIC | Age: 67
End: 2017-07-21

## 2017-07-21 DIAGNOSIS — N39.0 URINARY TRACT INFECTION WITHOUT HEMATURIA, SITE UNSPECIFIED: Primary | ICD-10-CM

## 2017-07-21 LAB — BACTERIA UR CULT: NORMAL

## 2017-07-21 RX ORDER — CIPROFLOXACIN 500 MG/1
500 TABLET ORAL DAILY
Qty: 7 TABLET | Refills: 0 | Status: SHIPPED | OUTPATIENT
Start: 2017-07-21 | End: 2017-07-28

## 2017-07-21 RX ORDER — KETOCONAZOLE 200 MG/1
100 TABLET ORAL DAILY
Qty: 15 TABLET | Refills: 11 | Status: SHIPPED | OUTPATIENT
Start: 2017-07-21 | End: 2018-02-20 | Stop reason: SDUPTHER

## 2017-07-21 NOTE — TELEPHONE ENCOUNTER
Returned patient's call regarding medication refill for Ketoconazole to be sent to [facility]'s pharmacy.

## 2017-07-21 NOTE — TELEPHONE ENCOUNTER
----- Message from Gabbie Antonio sent at 7/21/2017  9:44 AM CDT -----  Contact: patient   Requesting a return call regarding medication please call

## 2017-07-21 NOTE — TELEPHONE ENCOUNTER
Diagnoses and all orders for this visit:    Urinary tract infection without hematuria, site unspecified    Other orders  -     ciprofloxacin HCl (CIPRO) 500 MG tablet; Take 1 tablet (500 mg total) by mouth once daily at 6am.    eRxed to the pharmacy.  Please call and advise the patient to take the medication as given.

## 2017-07-24 ENCOUNTER — TELEPHONE (OUTPATIENT)
Dept: UROLOGY | Facility: CLINIC | Age: 67
End: 2017-07-24

## 2017-07-28 ENCOUNTER — OFFICE VISIT (OUTPATIENT)
Dept: INFECTIOUS DISEASES | Facility: CLINIC | Age: 67
End: 2017-07-28
Payer: MEDICARE

## 2017-07-28 ENCOUNTER — OFFICE VISIT (OUTPATIENT)
Dept: UROLOGY | Facility: CLINIC | Age: 67
End: 2017-07-28
Payer: MEDICARE

## 2017-07-28 VITALS
SYSTOLIC BLOOD PRESSURE: 117 MMHG | HEIGHT: 71 IN | WEIGHT: 147.5 LBS | DIASTOLIC BLOOD PRESSURE: 78 MMHG | HEART RATE: 63 BPM | BODY MASS INDEX: 20.65 KG/M2

## 2017-07-28 VITALS
HEART RATE: 68 BPM | DIASTOLIC BLOOD PRESSURE: 79 MMHG | BODY MASS INDEX: 20.65 KG/M2 | HEIGHT: 71 IN | WEIGHT: 147.5 LBS | TEMPERATURE: 99 F | SYSTOLIC BLOOD PRESSURE: 119 MMHG

## 2017-07-28 DIAGNOSIS — Q64.32 CONGENITAL STRICTURE OF URETHRA: Primary | ICD-10-CM

## 2017-07-28 DIAGNOSIS — N40.0 BENIGN PROSTATIC HYPERPLASIA, PRESENCE OF LOWER URINARY TRACT SYMPTOMS UNSPECIFIED: Chronic | ICD-10-CM

## 2017-07-28 DIAGNOSIS — R19.7 DIARRHEA, UNSPECIFIED TYPE: ICD-10-CM

## 2017-07-28 DIAGNOSIS — N39.0 RECURRENT UTI: ICD-10-CM

## 2017-07-28 DIAGNOSIS — R82.71 BACTERIURIA: Primary | ICD-10-CM

## 2017-07-28 DIAGNOSIS — N32.3 ACQUIRED BLADDER DIVERTICULUM: ICD-10-CM

## 2017-07-28 PROCEDURE — 99214 OFFICE O/P EST MOD 30 MIN: CPT | Mod: S$PBB,,, | Performed by: INTERNAL MEDICINE

## 2017-07-28 PROCEDURE — 1159F MED LIST DOCD IN RCRD: CPT | Mod: ,,, | Performed by: UROLOGY

## 2017-07-28 PROCEDURE — 1159F MED LIST DOCD IN RCRD: CPT | Mod: ,,, | Performed by: INTERNAL MEDICINE

## 2017-07-28 PROCEDURE — 99213 OFFICE O/P EST LOW 20 MIN: CPT | Mod: PBBFAC,27 | Performed by: INTERNAL MEDICINE

## 2017-07-28 PROCEDURE — 99999 PR PBB SHADOW E&M-EST. PATIENT-LVL III: CPT | Mod: PBBFAC,,, | Performed by: INTERNAL MEDICINE

## 2017-07-28 PROCEDURE — 99214 OFFICE O/P EST MOD 30 MIN: CPT | Mod: S$PBB,,, | Performed by: UROLOGY

## 2017-07-28 PROCEDURE — 1126F AMNT PAIN NOTED NONE PRSNT: CPT | Mod: ,,, | Performed by: INTERNAL MEDICINE

## 2017-07-28 PROCEDURE — 1126F AMNT PAIN NOTED NONE PRSNT: CPT | Mod: ,,, | Performed by: UROLOGY

## 2017-07-28 PROCEDURE — 99999 PR PBB SHADOW E&M-EST. PATIENT-LVL III: CPT | Mod: PBBFAC,,, | Performed by: UROLOGY

## 2017-07-28 NOTE — PROGRESS NOTES
Infectious Diseases Clinic Note    Subjective:       Patient ID: Alin Burkett is a 67 y.o. male.    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 16 ( CMV D+/R+, alemtuzumab induction, mmf/tacro, [facility] IR donor RPR + s/p PCN x 3) c/b AVR given thymo 2017 and VRE UTI 2017 ultimately found to have urethral stricture, BPH and bladder diverticulum s/p urethral dilation  with recommended initiation of CIC 2x perday     ucx E cloacae R to tetracycline, tmp/smx, I to nitrofurantoin   ucx VRE faecium    Past Medical History:   Diagnosis Date    Acidosis     Adrenal adenoma     Anemia associated with chronic renal failure     Arrhythmia, onset 2015    Awaiting organ transplant status 2013    Basal cell carcinoma 2012    left nasal tip    Blood type B+ 2013    Cancer     Celiac artery dissection     Chronic diarrhea     Chronic urethral stricture     Congenital absence of kidney     left    -donor kidney transplant 16     Induced w Campath 30 mg IV intraoperatively & SoluMedrol 875 mg total over 3 days.  Renal allograft biopsy 17 (DIVINE): 21 glomeruli, none globally sclerosed, <5% interstitial fibrosis, no ACR, c4d negative, AVR CCT Type 2 (V1 lesion); plan THYMO     Dissecting aortic aneurysm (any part), abdominal     Encounter for blood transfusion     ESRD (end stage renal disease) 2010    H/O: urethral stricture     History of AAA (abdominal aortic aneurysm) repair     Hypertension     Hypokalemia     Hypothyroidism 1/10/2014    Inguinal hernia bilateral, non-recurrent     Kidney stones     Organ transplant candidate 2013    Plantar warts 1/10/2014    Recurrent UTI 2017    S/P kidney transplant     Secondary hyperparathyroidism, renal     Thyroid disease        Social History     Social History    Marital status: Significant Other      Spouse name: N/A    Number of children: N/A    Years of education: N/A     Occupational History     Disabled     Social History Main Topics    Smoking status: Former Smoker     Years: 40.00     Types: Cigarettes     Quit date: 6/16/2010    Smokeless tobacco: Never Used    Alcohol use No      Comment: stopped ETOH in 2010    Drug use: No      Comment: THC in youth    Sexual activity: Yes     Partners: Female     Birth control/ protection: None     Other Topics Concern    Not on file     Social History Narrative    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         Current Outpatient Prescriptions:     ciprofloxacin HCl (CIPRO) 500 MG tablet, Take 1 tablet (500 mg total) by mouth once daily at 6am., Disp: 7 tablet, Rfl: 0    famotidine (PEPCID) 20 MG tablet, Take 1 tablet (20 mg total) by mouth every evening., Disp: 30 tablet, Rfl: 11    k phos di & mono-sod phos mono (K-PHOS-NEUTRAL) 250 mg Tab, Take 4 tablets by mouth 4 (four) times daily., Disp: 480 tablet, Rfl: 11    ketoconazole (NIZORAL) 200 mg Tab, Take 0.5 tablets (100 mg total) by mouth once daily., Disp: 15 tablet, Rfl: 11    levothyroxine (SYNTHROID) 112 MCG tablet, Take 1 tablet (112 mcg total) by mouth once daily., Disp: 30 tablet, Rfl: 6    magnesium oxide (MAG-OX) 400 mg tablet, Take 1 tablet (400 mg total) by mouth once daily., Disp: 30 tablet, Rfl: 11    metoprolol tartrate (LOPRESSOR) 25 MG tablet, Take 0.5 tablets (12.5 mg total) by mouth 2 (two) times daily., Disp: 30 tablet, Rfl: 11    multivitamin (ONE DAILY MULTIVITAMIN) per tablet, Take 1 tablet by mouth once daily., Disp: , Rfl:     mycophenolate (CELLCEPT) 250 mg Cap, Take 3 capsules (750 mg total) by mouth 2 (two) times daily. Z94.0/Kidney Transplant on 11/26/16, Disp: 180 capsule, Rfl: 11    oxycodone-acetaminophen (PERCOCET) 5-325 mg per tablet, Take 1 tablet by mouth every 4 (four) hours as needed for Pain., Disp: 10 tablet, Rfl: 0    predniSONE (DELTASONE) 5  MG tablet, Take 1 tablet (5 mg total) by mouth once daily. Z94.0/Kidney transplant on 11/26/2016, Disp: 30 tablet, Rfl: 11    sodium bicarbonate 650 MG tablet, Take 3 tablets (1,950 mg total) by mouth 2 (two) times daily., Disp: 180 tablet, Rfl: 11    sulfamethoxazole-trimethoprim 400-80mg (BACTRIM,SEPTRA) 400-80 mg per tablet, Take 1 tablet by mouth once daily. Stop: 11/26/17, Disp: 30 tablet, Rfl: 11    tacrolimus (PROGRAF) 1 MG Cap, Take 5 capsules (5mg total) in AM & 4 capsules (4mg total) in PM by mouth daily. Z94.0/Kidney Transplant on 11/26/16, Disp: 270 capsule, Rfl: 11    Review of Systems   Constitutional: Negative for activity change, appetite change, chills, fatigue and fever.   HENT: Negative for congestion, dental problem, mouth sores and sinus pressure.    Eyes: Negative for pain, redness and visual disturbance.   Respiratory: Negative for cough, shortness of breath and wheezing.    Cardiovascular: Negative for chest pain and leg swelling.   Gastrointestinal: Negative for abdominal distention, abdominal pain, diarrhea, nausea and vomiting.   Endocrine: Negative for polyuria.   Genitourinary: Negative for decreased urine volume, dysuria and frequency.   Musculoskeletal: Negative for joint swelling.   Skin: Negative for color change.   Allergic/Immunologic: Negative for food allergies.   Neurological: Negative for dizziness, weakness and headaches.   Hematological: Negative for adenopathy.   Psychiatric/Behavioral: Negative for agitation and confusion. The patient is not nervous/anxious.            Objective:      Vitals:    07/28/17 1328   BP: 119/79   Pulse: 68   Temp: 98.7 °F (37.1 °C)     Physical Exam   Constitutional: He is oriented to person, place, and time. He appears well-developed and well-nourished.   HENT:   Head: Normocephalic and atraumatic.   Mouth/Throat: Oropharynx is clear and moist.   Eyes: Conjunctivae are normal.   Neck: Neck supple.   Cardiovascular: Normal rate, regular rhythm  and normal heart sounds.    No murmur heard.  Pulmonary/Chest: Effort normal and breath sounds normal. No respiratory distress. He has no wheezes.   Abdominal: Soft. Bowel sounds are normal. He exhibits no distension. There is no tenderness.   Musculoskeletal: Normal range of motion. He exhibits no edema or tenderness.   Lymphadenopathy:     He has no cervical adenopathy.   Neurological: He is alert and oriented to person, place, and time. Coordination normal.   Skin: Skin is warm and dry. No rash noted.   Psychiatric: He has a normal mood and affect. His behavior is normal.           Assessment/Plan:       No diagnosis found.    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  - no abx needed at this time, pt understands what to look for and to call coordinator with issues

## 2017-07-28 NOTE — PROGRESS NOTES
CC: recurrent enterococcus UTI, bladder diverticulum, urethral stricture    Alin Burkett is a 67 y.o. man who is here for the evaluation of ESRD( END STAGE RENAL DISEASE)  (FOLLOW UP APPIONTMENT PT HAVE NO UROLOGY COMPLAINTS ON TODAY VIST.)  s/p cysto and urethral dilation for anterior urethral stricture.  Bladder diverticulum was found on cysto.  He is here to discuss the future management of his urinary conditions.    States that he can void freely.  He is pleased with the outcome of the surgery.    Denies flank pain, dysuira, hematuria .      Past Medical History:   Diagnosis Date    Acidosis     Adrenal adenoma     Anemia associated with chronic renal failure     Arrhythmia, onset 2015    Awaiting organ transplant status 2013    Basal cell carcinoma 2012    left nasal tip    Blood type B+ 2013    Cancer     Celiac artery dissection     Chronic diarrhea     Chronic urethral stricture     Congenital absence of kidney     left    -donor kidney transplant 16     Induced w Campath 30 mg IV intraoperatively & SoluMedrol 875 mg total over 3 days.  Renal allograft biopsy 17 (DIVINE): 21 glomeruli, none globally sclerosed, <5% interstitial fibrosis, no ACR, c4d negative, AVR CCT Type 2 (V1 lesion); plan THYMO     Dissecting aortic aneurysm (any part), abdominal     Encounter for blood transfusion     ESRD (end stage renal disease) 2010    H/O: urethral stricture     History of AAA (abdominal aortic aneurysm) repair     Hypertension     Hypokalemia     Hypothyroidism 1/10/2014    Inguinal hernia bilateral, non-recurrent     Kidney stones     Organ transplant candidate 2013    Plantar warts 1/10/2014    Recurrent UTI 2017    S/P kidney transplant     Secondary hyperparathyroidism, renal     Thyroid disease      Past Surgical History:   Procedure Laterality Date    ABDOMINAL SURGERY      exploratory lapatomy x 2    AORTA  - SUPERIOR MESENTERIC ARTERY BYPASS GRAFT      BLADDER NECK RECONSTRUCTION      BLADDER SURGERY      CYSTOSCOPY      GASTROJEJUNOSTOMY      HEMORRHOID SURGERY      HERNIA REPAIR      KIDNEY TRANSPLANT      LITHOTRIPSY      PERCUTANEOUS NEPHROLITHOTRIPSY      right  ESWL  10/31/12    right ESWL  6/26/12     Social History   Substance Use Topics    Smoking status: Former Smoker     Years: 40.00     Types: Cigarettes     Quit date: 6/16/2010    Smokeless tobacco: Never Used    Alcohol use No      Comment: stopped ETOH in 2010     Family History   Problem Relation Age of Onset    Diabetes Mother     Cancer Brother      thyroid cancer    Stroke Maternal Aunt     Kidney disease Paternal Uncle     Kidney disease Cousin     Melanoma Neg Hx     Psoriasis Neg Hx     Lupus Neg Hx     Eczema Neg Hx      Allergy:  Review of patient's allergies indicates:  No Known Allergies  Outpatient Encounter Prescriptions as of 7/28/2017   Medication Sig Dispense Refill    ciprofloxacin HCl (CIPRO) 500 MG tablet Take 1 tablet (500 mg total) by mouth once daily at 6am. 7 tablet 0    famotidine (PEPCID) 20 MG tablet Take 1 tablet (20 mg total) by mouth every evening. 30 tablet 11    k phos di & mono-sod phos mono (K-PHOS-NEUTRAL) 250 mg Tab Take 4 tablets by mouth 4 (four) times daily. 480 tablet 11    ketoconazole (NIZORAL) 200 mg Tab Take 0.5 tablets (100 mg total) by mouth once daily. 15 tablet 11    levothyroxine (SYNTHROID) 112 MCG tablet Take 1 tablet (112 mcg total) by mouth once daily. 30 tablet 6    magnesium oxide (MAG-OX) 400 mg tablet Take 1 tablet (400 mg total) by mouth once daily. 30 tablet 11    metoprolol tartrate (LOPRESSOR) 25 MG tablet Take 0.5 tablets (12.5 mg total) by mouth 2 (two) times daily. 30 tablet 11    multivitamin (ONE DAILY MULTIVITAMIN) per tablet Take 1 tablet by mouth once daily.      mycophenolate (CELLCEPT) 250 mg Cap Take 3 capsules (750 mg total) by mouth 2 (two) times  daily. Z94.0/Kidney Transplant on 11/26/16 180 capsule 11    oxycodone-acetaminophen (PERCOCET) 5-325 mg per tablet Take 1 tablet by mouth every 4 (four) hours as needed for Pain. 10 tablet 0    predniSONE (DELTASONE) 5 MG tablet Take 1 tablet (5 mg total) by mouth once daily. Z94.0/Kidney transplant on 11/26/2016 30 tablet 11    sodium bicarbonate 650 MG tablet Take 3 tablets (1,950 mg total) by mouth 2 (two) times daily. 180 tablet 11    sulfamethoxazole-trimethoprim 400-80mg (BACTRIM,SEPTRA) 400-80 mg per tablet Take 1 tablet by mouth once daily. Stop: 11/26/17 30 tablet 11    tacrolimus (PROGRAF) 1 MG Cap Take 5 capsules (5mg total) in AM & 4 capsules (4mg total) in PM by mouth daily. Z94.0/Kidney Transplant on 11/26/16 270 capsule 11    [DISCONTINUED] polyethylene glycol (GLYCOLAX) 17 gram PwPk Take 17 g by mouth once daily. 30 packet 0     No facility-administered encounter medications on file as of 7/28/2017.      Review of Systems   General ROS: GENERAL:  No weight gain or loss  SKIN:  No rashes or lacerations  HEAD:  No headaches  EYES:  No exophthalmos, jaundice or blindness  EARS:  No dizziness, tinnitus or hearing loss  NOSE:  No changes in smell  MOUTH & THROAT:  No dyskinetic movements or obvious goiter  CHEST:  No shortness of breath, hyperventilation or cough  CARDIOVASCULAR:  No tachycardia or chest pain  ABDOMEN:  No nausea, vomiting, pain, constipation or diarrhea  URINARY:  No frequency, dysuria or sexual dysfunction  ENDOCRINE:  No polydipsia, polyuria  MUSCULOSKELETAL:  No pain or stiffness of the joints  NEUROLOGIC:  No weakness, sensory changes, seizures, confusion, memory loss, tremor or other abnormal movements  Physical Exam     Vitals:    07/28/17 1123   BP: 117/78   Pulse: 63     Physical Exam  Genitalia:  Scrotum: no rash or lesion  Normal symmetric epididymis without masses  Normal vas palpated  Normal size, symmetric testicles with no masses   Normal urethral meatus with no  discharge  Normal circumcised penis with no lesion   Rectal:  Normal perineum and anus upon inspection.  Normal tone, no masses or tenderness;     LABS:  Lab Results   Component Value Date    PSA 0.41 10/18/2016    PSA 0.32 10/20/2015    PSA 0.45 11/04/2014    PSA 0.79 11/21/2013    PSA 0.53 01/04/2013    PSA 1.1 05/17/2011    PSA 0.4 04/23/2008     No results found for this or any previous visit.  Lab Results   Component Value Date    CREATININE 1.5 (H) 07/06/2017    CREATININE 1.6 (H) 06/06/2017    CREATININE 1.3 05/02/2017     Results for orders placed or performed in visit on 05/15/12   Testosterone   Result Value Ref Range    Testosterone, Total 652 195 - 1138 ng/dl     Urine Culture, Routine   Date Value Ref Range Status   07/19/2017 ENTEROBACTER CLOACAE  >100,000 cfu/ml    Final       Assessment and Plan:  Alin was seen today for esrd( end stage renal disease) 06-/16/2010.    Diagnoses and all orders for this visit:    Congenital stricture of urethra    Recurrent UTI    Acquired bladder diverticulum    Benign prostatic hyperplasia, presence of lower urinary tract symptoms unspecified    start CIC 2 x day indefinitely to improve his bladder emptying, minimize recurrent urethral stricture.  I believe that this will lower his recurrent UTI.  Recommend CIC in a sterile fashion.  Pt was taught how to do CIC today.    Will follow up in 3 months to check and see how he does.  I spent 25 minutes with the patient of which more than half was spent in direct consultation with the patient in regards to our treatment and plan.    Follow-up:  Return in about 3 months (around 10/28/2017).

## 2017-08-02 ENCOUNTER — LAB VISIT (OUTPATIENT)
Dept: LAB | Facility: HOSPITAL | Age: 67
End: 2017-08-02
Attending: INTERNAL MEDICINE
Payer: MEDICARE

## 2017-08-02 DIAGNOSIS — Z94.0 STATUS POST KIDNEY TRANSPLANT: ICD-10-CM

## 2017-08-02 DIAGNOSIS — Z94.0 KIDNEY REPLACED BY TRANSPLANT: ICD-10-CM

## 2017-08-02 DIAGNOSIS — Z79.899 ENCOUNTER FOR LONG-TERM (CURRENT) USE OF OTHER MEDICATIONS: ICD-10-CM

## 2017-08-02 LAB
ALBUMIN SERPL BCP-MCNC: 3.6 G/DL
ALBUMIN SERPL BCP-MCNC: 3.6 G/DL
ALP SERPL-CCNC: 75 U/L
ALT SERPL W/O P-5'-P-CCNC: 33 U/L
ANION GAP SERPL CALC-SCNC: 9 MMOL/L
AST SERPL-CCNC: 31 U/L
BASOPHILS # BLD AUTO: 0.01 K/UL
BASOPHILS NFR BLD: 0.2 %
BILIRUB DIRECT SERPL-MCNC: 0.2 MG/DL
BILIRUB SERPL-MCNC: 0.3 MG/DL
BUN SERPL-MCNC: 16 MG/DL
CALCIUM SERPL-MCNC: 8.7 MG/DL
CHLORIDE SERPL-SCNC: 110 MMOL/L
CO2 SERPL-SCNC: 23 MMOL/L
CREAT SERPL-MCNC: 1.5 MG/DL
DIFFERENTIAL METHOD: ABNORMAL
EOSINOPHIL # BLD AUTO: 0.1 K/UL
EOSINOPHIL NFR BLD: 2.7 %
ERYTHROCYTE [DISTWIDTH] IN BLOOD BY AUTOMATED COUNT: 13.6 %
EST. GFR  (AFRICAN AMERICAN): 55.3 ML/MIN/1.73 M^2
EST. GFR  (NON AFRICAN AMERICAN): 47.8 ML/MIN/1.73 M^2
GLUCOSE SERPL-MCNC: 78 MG/DL
HCT VFR BLD AUTO: 35.7 %
HGB BLD-MCNC: 11.9 G/DL
LYMPHOCYTES # BLD AUTO: 0.9 K/UL
LYMPHOCYTES NFR BLD: 19.5 %
MAGNESIUM SERPL-MCNC: 1.8 MG/DL
MCH RBC QN AUTO: 31.7 PG
MCHC RBC AUTO-ENTMCNC: 33.3 G/DL
MCV RBC AUTO: 95 FL
MONOCYTES # BLD AUTO: 0.4 K/UL
MONOCYTES NFR BLD: 9.8 %
NEUTROPHILS # BLD AUTO: 3 K/UL
NEUTROPHILS NFR BLD: 67.3 %
PHOSPHATE SERPL-MCNC: 3.2 MG/DL
PLATELET # BLD AUTO: 152 K/UL
PMV BLD AUTO: 10.7 FL
POTASSIUM SERPL-SCNC: 4 MMOL/L
PROT SERPL-MCNC: 6.7 G/DL
RBC # BLD AUTO: 3.75 M/UL
SODIUM SERPL-SCNC: 142 MMOL/L
WBC # BLD AUTO: 4.41 K/UL

## 2017-08-02 PROCEDURE — 80197 ASSAY OF TACROLIMUS: CPT

## 2017-08-02 PROCEDURE — 80069 RENAL FUNCTION PANEL: CPT

## 2017-08-02 PROCEDURE — 85025 COMPLETE CBC W/AUTO DIFF WBC: CPT

## 2017-08-02 PROCEDURE — 36415 COLL VENOUS BLD VENIPUNCTURE: CPT | Mod: PO

## 2017-08-02 PROCEDURE — 84075 ASSAY ALKALINE PHOSPHATASE: CPT

## 2017-08-02 PROCEDURE — 83735 ASSAY OF MAGNESIUM: CPT

## 2017-08-02 PROCEDURE — 87799 DETECT AGENT NOS DNA QUANT: CPT

## 2017-08-03 LAB — TACROLIMUS BLD-MCNC: 8.8 NG/ML

## 2017-08-04 LAB
BK VIRUS DNA PCR, QUANT, BLOOD: <125 COPIES/ML
BK VIRUS DNA, BLOOD: NOT DETECTED
LOG BKV COPIES/ML: <2.1 LOG (10) COPIES/ML

## 2017-08-18 RX ORDER — MYCOPHENOLATE MOFETIL 250 MG/1
750 CAPSULE ORAL 2 TIMES DAILY
Qty: 180 CAPSULE | Refills: 11 | Status: SHIPPED | OUTPATIENT
Start: 2017-08-18 | End: 2018-02-09 | Stop reason: SDUPTHER

## 2017-08-18 NOTE — TELEPHONE ENCOUNTER
Patient called requesting his prescription for Cellcept be sent to [facility]'s pharmacy.   Medication routed as requested.

## 2017-08-22 RX ORDER — TACROLIMUS 1 MG/1
CAPSULE ORAL
Qty: 270 CAPSULE | Refills: 11 | Status: SHIPPED | OUTPATIENT
Start: 2017-08-22 | End: 2017-09-07 | Stop reason: SDUPTHER

## 2017-08-22 NOTE — TELEPHONE ENCOUNTER
----- Message from Neisha Reardon sent at 8/22/2017 11:25 AM CDT -----  Good morning,    Mr Ogden is running low on his prograf and requests rx be sent to [facility] in [place]. Please send as soon as possible.     Thank you  Outpatient pharmacy

## 2017-08-31 ENCOUNTER — TELEPHONE (OUTPATIENT)
Dept: TRANSPLANT | Facility: CLINIC | Age: 67
End: 2017-08-31

## 2017-09-06 ENCOUNTER — LAB VISIT (OUTPATIENT)
Dept: LAB | Facility: HOSPITAL | Age: 67
End: 2017-09-06
Attending: INTERNAL MEDICINE
Payer: MEDICARE

## 2017-09-06 DIAGNOSIS — Z94.0 KIDNEY REPLACED BY TRANSPLANT: ICD-10-CM

## 2017-09-06 DIAGNOSIS — Z72.89 OTHER PROBLEMS RELATED TO LIFESTYLE: ICD-10-CM

## 2017-09-06 LAB
ALBUMIN SERPL BCP-MCNC: 3.5 G/DL
ANION GAP SERPL CALC-SCNC: 9 MMOL/L
BASOPHILS # BLD AUTO: 0.01 K/UL
BASOPHILS NFR BLD: 0.3 %
BUN SERPL-MCNC: 22 MG/DL
CALCIUM SERPL-MCNC: 8.8 MG/DL
CHLORIDE SERPL-SCNC: 107 MMOL/L
CO2 SERPL-SCNC: 25 MMOL/L
CREAT SERPL-MCNC: 1.5 MG/DL
DIFFERENTIAL METHOD: ABNORMAL
EOSINOPHIL # BLD AUTO: 0.1 K/UL
EOSINOPHIL NFR BLD: 2.3 %
ERYTHROCYTE [DISTWIDTH] IN BLOOD BY AUTOMATED COUNT: 14 %
EST. GFR  (AFRICAN AMERICAN): 55.3 ML/MIN/1.73 M^2
EST. GFR  (NON AFRICAN AMERICAN): 47.8 ML/MIN/1.73 M^2
GLUCOSE SERPL-MCNC: 80 MG/DL
HCT VFR BLD AUTO: 37.6 %
HGB BLD-MCNC: 12.3 G/DL
LYMPHOCYTES # BLD AUTO: 0.7 K/UL
LYMPHOCYTES NFR BLD: 17.1 %
MAGNESIUM SERPL-MCNC: 1.9 MG/DL
MCH RBC QN AUTO: 31.5 PG
MCHC RBC AUTO-ENTMCNC: 32.7 G/DL
MCV RBC AUTO: 96 FL
MONOCYTES # BLD AUTO: 0.4 K/UL
MONOCYTES NFR BLD: 8.8 %
NEUTROPHILS # BLD AUTO: 2.8 K/UL
NEUTROPHILS NFR BLD: 71.2 %
PHOSPHATE SERPL-MCNC: 3.1 MG/DL
PLATELET # BLD AUTO: 136 K/UL
PMV BLD AUTO: 11.4 FL
POTASSIUM SERPL-SCNC: 3.6 MMOL/L
RBC # BLD AUTO: 3.91 M/UL
SODIUM SERPL-SCNC: 141 MMOL/L
WBC # BLD AUTO: 3.97 K/UL

## 2017-09-06 PROCEDURE — 80197 ASSAY OF TACROLIMUS: CPT

## 2017-09-06 PROCEDURE — 85025 COMPLETE CBC W/AUTO DIFF WBC: CPT

## 2017-09-06 PROCEDURE — 86780 TREPONEMA PALLIDUM: CPT

## 2017-09-06 PROCEDURE — 83735 ASSAY OF MAGNESIUM: CPT

## 2017-09-06 PROCEDURE — 80069 RENAL FUNCTION PANEL: CPT

## 2017-09-06 PROCEDURE — 86592 SYPHILIS TEST NON-TREP QUAL: CPT

## 2017-09-06 PROCEDURE — 36415 COLL VENOUS BLD VENIPUNCTURE: CPT | Mod: PO

## 2017-09-07 LAB
RPR SER QL: NORMAL
TACROLIMUS BLD-MCNC: 10.8 NG/ML

## 2017-09-07 RX ORDER — TACROLIMUS 1 MG/1
4 CAPSULE ORAL EVERY 12 HOURS
Qty: 240 CAPSULE | Refills: 11 | Status: SHIPPED | OUTPATIENT
Start: 2017-09-07 | End: 2017-09-25 | Stop reason: SDUPTHER

## 2017-09-07 NOTE — TELEPHONE ENCOUNTER
----- Message from Chasidy Mcclain MD sent at 9/7/2017  4:32 PM CDT -----  Please decrease prograf to 4 mg BID if it is a true trough and check level in a week. Thank you!

## 2017-09-07 NOTE — TELEPHONE ENCOUNTER
Patient wife ghassan repeated back and voice a understanding of orders and will decrease Tacro to 4mg po BID . Next labs 9/12/17 . Patient wife also requesting a GI consult for chronic diarrhea .

## 2017-09-12 ENCOUNTER — LAB VISIT (OUTPATIENT)
Dept: LAB | Facility: HOSPITAL | Age: 67
End: 2017-09-12
Attending: INTERNAL MEDICINE
Payer: MEDICARE

## 2017-09-12 ENCOUNTER — OFFICE VISIT (OUTPATIENT)
Dept: GASTROENTEROLOGY | Facility: CLINIC | Age: 67
End: 2017-09-12
Payer: MEDICARE

## 2017-09-12 VITALS
DIASTOLIC BLOOD PRESSURE: 72 MMHG | WEIGHT: 148.81 LBS | HEIGHT: 71 IN | HEART RATE: 64 BPM | RESPIRATION RATE: 20 BRPM | SYSTOLIC BLOOD PRESSURE: 118 MMHG | BODY MASS INDEX: 20.83 KG/M2

## 2017-09-12 DIAGNOSIS — Z94.0 HISTORY OF KIDNEY TRANSPLANT: ICD-10-CM

## 2017-09-12 DIAGNOSIS — K52.9 CHRONIC DIARRHEA: Primary | ICD-10-CM

## 2017-09-12 DIAGNOSIS — Z94.0 KIDNEY REPLACED BY TRANSPLANT: ICD-10-CM

## 2017-09-12 LAB
ALBUMIN SERPL BCP-MCNC: 3.4 G/DL
ANION GAP SERPL CALC-SCNC: 11 MMOL/L
BASOPHILS # BLD AUTO: 0.01 K/UL
BASOPHILS NFR BLD: 0.2 %
BUN SERPL-MCNC: 21 MG/DL
CALCIUM SERPL-MCNC: 8.9 MG/DL
CHLORIDE SERPL-SCNC: 110 MMOL/L
CO2 SERPL-SCNC: 22 MMOL/L
CREAT SERPL-MCNC: 1.5 MG/DL
DIFFERENTIAL METHOD: ABNORMAL
EOSINOPHIL # BLD AUTO: 0.1 K/UL
EOSINOPHIL NFR BLD: 2.3 %
ERYTHROCYTE [DISTWIDTH] IN BLOOD BY AUTOMATED COUNT: 13.8 %
EST. GFR  (AFRICAN AMERICAN): 55.3 ML/MIN/1.73 M^2
EST. GFR  (NON AFRICAN AMERICAN): 47.8 ML/MIN/1.73 M^2
GLUCOSE SERPL-MCNC: 84 MG/DL
HCT VFR BLD AUTO: 37.1 %
HGB BLD-MCNC: 12.1 G/DL
LYMPHOCYTES # BLD AUTO: 0.7 K/UL
LYMPHOCYTES NFR BLD: 14.7 %
MAGNESIUM SERPL-MCNC: 2 MG/DL
MCH RBC QN AUTO: 31.5 PG
MCHC RBC AUTO-ENTMCNC: 32.6 G/DL
MCV RBC AUTO: 97 FL
MONOCYTES # BLD AUTO: 0.4 K/UL
MONOCYTES NFR BLD: 9.7 %
NEUTROPHILS # BLD AUTO: 3.2 K/UL
NEUTROPHILS NFR BLD: 72.9 %
PHOSPHATE SERPL-MCNC: 2.9 MG/DL
PLATELET # BLD AUTO: 133 K/UL
PMV BLD AUTO: 11 FL
POTASSIUM SERPL-SCNC: 3.8 MMOL/L
RBC # BLD AUTO: 3.84 M/UL
SODIUM SERPL-SCNC: 143 MMOL/L
WBC # BLD AUTO: 4.43 K/UL

## 2017-09-12 PROCEDURE — 3074F SYST BP LT 130 MM HG: CPT | Mod: ,,, | Performed by: NURSE PRACTITIONER

## 2017-09-12 PROCEDURE — 85025 COMPLETE CBC W/AUTO DIFF WBC: CPT

## 2017-09-12 PROCEDURE — 99214 OFFICE O/P EST MOD 30 MIN: CPT | Mod: PBBFAC,PO | Performed by: NURSE PRACTITIONER

## 2017-09-12 PROCEDURE — 3078F DIAST BP <80 MM HG: CPT | Mod: ,,, | Performed by: NURSE PRACTITIONER

## 2017-09-12 PROCEDURE — 1159F MED LIST DOCD IN RCRD: CPT | Mod: ,,, | Performed by: NURSE PRACTITIONER

## 2017-09-12 PROCEDURE — 83735 ASSAY OF MAGNESIUM: CPT

## 2017-09-12 PROCEDURE — 80069 RENAL FUNCTION PANEL: CPT

## 2017-09-12 PROCEDURE — 80197 ASSAY OF TACROLIMUS: CPT

## 2017-09-12 PROCEDURE — 99999 PR PBB SHADOW E&M-EST. PATIENT-LVL IV: CPT | Mod: PBBFAC,,, | Performed by: NURSE PRACTITIONER

## 2017-09-12 PROCEDURE — 99214 OFFICE O/P EST MOD 30 MIN: CPT | Mod: S$PBB,,, | Performed by: NURSE PRACTITIONER

## 2017-09-12 PROCEDURE — 1126F AMNT PAIN NOTED NONE PRSNT: CPT | Mod: ,,, | Performed by: NURSE PRACTITIONER

## 2017-09-12 PROCEDURE — 36415 COLL VENOUS BLD VENIPUNCTURE: CPT | Mod: PO

## 2017-09-12 NOTE — PATIENT INSTRUCTIONS

## 2017-09-12 NOTE — LETTER
September 12, 2017        Chasidy Mcclain MD  [address]  [facility] [address]             [facility] [facility]  [address]  [place] [address]  Phone: [phone]   Patient: Alin Burkett   MR Number: [id number]   YOB: 1950   Date of Visit: 9/12/2017       Dear Dr. Mcclain:    Thank you for referring Alin Burkett to me for evaluation. Below are the relevant portions of my assessment and plan of care.            If you have questions, please do not hesitate to call me. I look forward to following Alin along with you.    Sincerely,      Jessica Grissom, VIC           CC  No Recipients

## 2017-09-12 NOTE — PROGRESS NOTES
Subjective:       Patient ID: Alin Burkett is a 67 y.o. male Body mass index is 20.75 kg/m².    Chief Complaint: Diarrhea (has had it for years)    This patient is new to me.  Referred by Dr. Mcclain for chronic diarrhea  Established patient of Dr. Gutierrez (last in 2013)    Diarrhea    This is a chronic problem. Episode onset: started several years ago, prior to kidney transplant; greater than 5 years ago. The problem occurs 2 to 4 times per day (anytime he eats or drinks). The problem has been waxing and waning. Diarrhea characteristics: rated 5- 7 on bristol stool scale. The patient states that diarrhea awakens him from sleep. Associated symptoms include increased flatus. Pertinent negatives include no abdominal pain, chills, coughing, fever, vomiting or weight loss. Associated symptoms comments: Bowel urgency. Exacerbated by: anytime he eats. Risk factors include recent antibiotic use and recent hospitalization (bactrim currently; last hospitalizations: kidney transplant 11/2016 & 1/2017; denies foriegn travel, ill contacts, suspect food intake, or new/change in medications). He has tried nothing for the symptoms. His past medical history is significant for a recent abdominal surgery (kidney transplant; reports abdominal surgery in the past after AAA rupture). There is no history of bowel resection or inflammatory bowel disease.     Review of Systems   Constitutional: Negative for appetite change, chills, fatigue, fever, unexpected weight change and weight loss.   HENT: Negative for sore throat and trouble swallowing.    Respiratory: Negative for cough, choking and shortness of breath.    Cardiovascular: Negative for chest pain.   Gastrointestinal: Positive for diarrhea and flatus. Negative for abdominal pain, anal bleeding, blood in stool, constipation, nausea, rectal pain and vomiting.   Genitourinary: Negative for difficulty urinating, dysuria, flank pain, frequency and urgency.   Neurological: Negative for  weakness.       Past Medical History:   Diagnosis Date    Acidosis     Adrenal adenoma     Anemia associated with chronic renal failure     Arrhythmia, onset 2015    Awaiting organ transplant status 2013    Basal cell carcinoma 2012    left nasal tip    Blood type B+ 2013    Cancer     Celiac artery dissection     Chronic diarrhea     Chronic urethral stricture     Congenital absence of kidney     left    -donor kidney transplant 16     Induced w Campath 30 mg IV intraoperatively & SoluMedrol 875 mg total over 3 days.  Renal allograft biopsy 17 (DIVINE): 21 glomeruli, none globally sclerosed, <5% interstitial fibrosis, no ACR, c4d negative, AVR CCT Type 2 (V1 lesion); plan THYMO     Dissecting aortic aneurysm (any part), abdominal     Diverticulosis     Encounter for blood transfusion     ESRD (end stage renal disease) 2010    H/O: urethral stricture     History of AAA (abdominal aortic aneurysm) repair     Hypertension     Hypokalemia     Hypothyroidism 1/10/2014    Inguinal hernia bilateral, non-recurrent     Kidney stones     Organ transplant candidate 2013    Plantar warts 1/10/2014    Recurrent UTI 2017    S/P kidney transplant     Secondary hyperparathyroidism, renal     Thyroid disease      Past Surgical History:   Procedure Laterality Date    ABDOMINAL SURGERY      exploratory lapatomy x 2    AORTA - SUPERIOR MESENTERIC ARTERY BYPASS GRAFT      BLADDER NECK RECONSTRUCTION      BLADDER SURGERY      COLONOSCOPY  10/10/2013    Dr. Gutierrez, repeat in 5 years    CYSTOSCOPY      GASTROJEJUNOSTOMY      HEMORRHOID SURGERY      HERNIA REPAIR      KIDNEY TRANSPLANT      LITHOTRIPSY      PERCUTANEOUS NEPHROLITHOTRIPSY      right  ESWL  10/31/12    right ESWL  12     Family History   Problem Relation Age of Onset    Diabetes Mother     Cancer Brother      thyroid cancer    Stroke Maternal Aunt     Kidney  disease Paternal Uncle     Kidney disease Cousin     Melanoma Neg Hx     Psoriasis Neg Hx     Lupus Neg Hx     Eczema Neg Hx     Colon cancer Neg Hx     Colon polyps Neg Hx     Crohn's disease Neg Hx     Ulcerative colitis Neg Hx     Celiac disease Neg Hx      Wt Readings from Last 10 Encounters:   09/12/17 67.5 kg (148 lb 13 oz)   07/28/17 66.9 kg (147 lb 7.8 oz)   07/28/17 66.9 kg (147 lb 7.8 oz)   07/19/17 64.9 kg (143 lb)   06/26/17 64.9 kg (143 lb)   05/22/17 68.4 kg (150 lb 12.7 oz)   02/20/17 63.7 kg (140 lb 6.9 oz)   02/01/17 61.4 kg (135 lb 5.8 oz)   01/23/17 59.9 kg (132 lb)   01/23/17 60.2 kg (132 lb 11.5 oz)     Lab Results   Component Value Date    WBC 4.43 09/12/2017    HGB 12.1 (L) 09/12/2017    HCT 37.1 (L) 09/12/2017    MCV 97 09/12/2017     (L) 09/12/2017     CMP  Sodium   Date Value Ref Range Status   09/06/2017 141 136 - 145 mmol/L Final     Potassium   Date Value Ref Range Status   09/06/2017 3.6 3.5 - 5.1 mmol/L Final     Chloride   Date Value Ref Range Status   09/06/2017 107 95 - 110 mmol/L Final     CO2   Date Value Ref Range Status   09/06/2017 25 23 - 29 mmol/L Final     Glucose   Date Value Ref Range Status   09/06/2017 80 70 - 110 mg/dL Final     BUN, Bld   Date Value Ref Range Status   09/06/2017 22 8 - 23 mg/dL Final     Creatinine   Date Value Ref Range Status   09/06/2017 1.5 (H) 0.5 - 1.4 mg/dL Final     Calcium   Date Value Ref Range Status   09/06/2017 8.8 8.7 - 10.5 mg/dL Final     Total Protein   Date Value Ref Range Status   08/02/2017 6.7 6.0 - 8.4 g/dL Final     Albumin   Date Value Ref Range Status   09/06/2017 3.5 3.5 - 5.2 g/dL Final     Total Bilirubin   Date Value Ref Range Status   08/02/2017 0.3 0.1 - 1.0 mg/dL Final     Comment:     For infants and newborns, interpretation of results should be based  on gestational age, weight and in agreement with clinical  observations.  Premature Infant recommended reference ranges:  Up to 24 hours.............<8.0  "mg/dL  Up to 48 hours............<12.0 mg/dL  3-5 days..................<15.0 mg/dL  6-29 days.................<15.0 mg/dL       Alkaline Phosphatase   Date Value Ref Range Status   08/02/2017 75 55 - 135 U/L Final     AST   Date Value Ref Range Status   08/02/2017 31 10 - 40 U/L Final     ALT   Date Value Ref Range Status   08/02/2017 33 10 - 44 U/L Final     Anion Gap   Date Value Ref Range Status   09/06/2017 9 8 - 16 mmol/L Final     eGFR if    Date Value Ref Range Status   09/06/2017 55.3 (A) >60 mL/min/1.73 m^2 Final     eGFR if non    Date Value Ref Range Status   09/06/2017 47.8 (A) >60 mL/min/1.73 m^2 Final     Comment:     Calculation used to obtain the estimated glomerular filtration  rate (eGFR) is the CKD-EPI equation. Since race is unknown   in our information system, the eGFR values for   -American and Non--American patients are given   for each creatinine result.       Lab Results   Component Value Date    LIPASE 43 01/20/2015     Lab Results   Component Value Date    TSH 2.797 06/20/2016     Reviewed prior medical records including radiology report of 1/5/17 abdominal ultrasound, 10/18/16 ct abdomen pelvis & endoscopy history (see surgical history).    10/10/13 Colonoscopy was reviewed and procedure report states:   " Findings:       The perianal exam was abnormal. Findings include non-thrombosed        external hemorrhoids.       Many medium-mouthed diverticula were found in the sigmoid colon, in        the descending colon, in the transverse colon and in the cecum.       The rectum and ascending colon appeared normal.  Impression:          - Non-thrombosed external hemorrhoids found on                        perianal exam.                       - Diverticulosis in the sigmoid colon, in the                        descending colon, in the transverse colon and in the                        cecum.                       - The rectum and ascending colon are " "normal.  Recommendation:      - High fiber diet.                       - Repeat colonoscopy in 5 years for screening                        purposes. ".    Objective:      Physical Exam   Constitutional: He is oriented to person, place, and time. He appears well-developed and well-nourished. No distress.   HENT:   Mouth/Throat: Oropharynx is clear and moist and mucous membranes are normal. No oral lesions. No oropharyngeal exudate.   Eyes: Conjunctivae are normal. Pupils are equal, round, and reactive to light. No scleral icterus.   Neck: Neck supple. No tracheal deviation present.   Cardiovascular: Normal rate.    Pulmonary/Chest: Effort normal and breath sounds normal. No respiratory distress. He has no wheezes.   Abdominal: Soft. Bowel sounds are normal. He exhibits no distension, no abdominal bruit and no mass. There is no hepatosplenomegaly. There is no tenderness. There is no rigidity, no rebound, no guarding, no tenderness at McBurney's point and negative Blanchard's sign. No hernia.   Well-healed surgical scars noted.   Lymphadenopathy:     He has no cervical adenopathy.   Neurological: He is alert and oriented to person, place, and time.   Skin: Skin is warm and dry. No rash noted. He is not diaphoretic. No erythema. No pallor.   Non-jaundiced   Psychiatric: He has a normal mood and affect. His behavior is normal.   Nursing note and vitals reviewed.      Assessment:       1. Chronic diarrhea    2. History of kidney transplant        Plan:       Chronic diarrhea  -     IgA; Future; Expected date: 09/12/2017  -     Tissue transglutaminase, IgA; Future; Expected date: 09/12/2017  -     Adenovirus Antigen EIA, Stool; Future; Expected date: 09/12/2017  -     Fecal fat, qualitative; Future; Expected date: 09/12/2017  -     Giardia / Cryptosporidum, EIA; Future; Expected date: 09/12/2017  -     Occult blood x 1, stool; Future; Expected date: 09/12/2017  -     pH, stool; Future; Expected date: 09/12/2017  -     " Rotavirus antigen, stool; Future; Expected date: 09/12/2017  -     Stool Exam-Ova,Cysts,Parasites; Future; Expected date: 09/12/2017  -     WBC, Stool; Future; Expected date: 09/12/2017  -     Stool culture; Future; Expected date: 09/12/2017  -     Clostridium difficile EIA; Future; Expected date: 09/12/2017  - bile acid sequestrant is contraindicated with transplant medications  - possible trial of bentyl pending results of testing and if symptoms persist  - schedule Colonoscopy, discussed procedure with the patient, patient verbalized understanding    History of kidney transplant  Recommend follow-up with Primary Care Provider and transplant team for continued evaluation and management.    Return in about 1 month (around 10/12/2017), or if symptoms worsen or fail to improve.      If no improvement in symptoms or symptoms worsen, call/follow-up at clinic or go to ER.

## 2017-09-13 LAB
T PALLIDUM AB SER QL IF: NORMAL
TACROLIMUS BLD-MCNC: 7.7 NG/ML

## 2017-09-25 ENCOUNTER — OFFICE VISIT (OUTPATIENT)
Dept: TRANSPLANT | Facility: CLINIC | Age: 67
End: 2017-09-25
Payer: MEDICARE

## 2017-09-25 VITALS
BODY MASS INDEX: 20.61 KG/M2 | HEIGHT: 71 IN | HEART RATE: 74 BPM | TEMPERATURE: 99 F | DIASTOLIC BLOOD PRESSURE: 79 MMHG | SYSTOLIC BLOOD PRESSURE: 111 MMHG | OXYGEN SATURATION: 100 % | RESPIRATION RATE: 16 BRPM | WEIGHT: 147.25 LBS

## 2017-09-25 DIAGNOSIS — C44.91 BASAL CELL CARCINOMA: ICD-10-CM

## 2017-09-25 DIAGNOSIS — N52.9 ERECTILE DYSFUNCTION, UNSPECIFIED ERECTILE DYSFUNCTION TYPE: ICD-10-CM

## 2017-09-25 DIAGNOSIS — K52.9 CHRONIC DIARRHEA: ICD-10-CM

## 2017-09-25 DIAGNOSIS — Z79.60 LONG-TERM USE OF IMMUNOSUPPRESSANT MEDICATION: Primary | ICD-10-CM

## 2017-09-25 DIAGNOSIS — Z94.0 KIDNEY REPLACED BY TRANSPLANT: Primary | ICD-10-CM

## 2017-09-25 DIAGNOSIS — Z94.0 S/P KIDNEY TRANSPLANT: ICD-10-CM

## 2017-09-25 DIAGNOSIS — E03.9 HYPOTHYROIDISM, UNSPECIFIED TYPE: ICD-10-CM

## 2017-09-25 DIAGNOSIS — R53.83 FATIGUE, UNSPECIFIED TYPE: ICD-10-CM

## 2017-09-25 PROCEDURE — 1126F AMNT PAIN NOTED NONE PRSNT: CPT | Mod: ,,, | Performed by: INTERNAL MEDICINE

## 2017-09-25 PROCEDURE — 99215 OFFICE O/P EST HI 40 MIN: CPT | Mod: S$PBB,,, | Performed by: INTERNAL MEDICINE

## 2017-09-25 PROCEDURE — 1159F MED LIST DOCD IN RCRD: CPT | Mod: ,,, | Performed by: INTERNAL MEDICINE

## 2017-09-25 PROCEDURE — 3074F SYST BP LT 130 MM HG: CPT | Mod: ,,, | Performed by: INTERNAL MEDICINE

## 2017-09-25 PROCEDURE — 99999 PR PBB SHADOW E&M-EST. PATIENT-LVL IV: CPT | Mod: PBBFAC,,, | Performed by: INTERNAL MEDICINE

## 2017-09-25 PROCEDURE — 3078F DIAST BP <80 MM HG: CPT | Mod: ,,, | Performed by: INTERNAL MEDICINE

## 2017-09-25 PROCEDURE — 99214 OFFICE O/P EST MOD 30 MIN: CPT | Mod: PBBFAC | Performed by: INTERNAL MEDICINE

## 2017-09-25 RX ORDER — TACROLIMUS 1 MG/1
4 CAPSULE ORAL EVERY 12 HOURS
Qty: 240 CAPSULE | Refills: 11 | Status: SHIPPED | OUTPATIENT
Start: 2017-09-25 | End: 2017-12-26 | Stop reason: DRUGHIGH

## 2017-09-25 RX ORDER — SODIUM BICARBONATE 650 MG/1
1950 TABLET ORAL 2 TIMES DAILY
Qty: 180 TABLET | Refills: 11 | Status: SHIPPED | OUTPATIENT
Start: 2017-09-25 | End: 2018-02-20 | Stop reason: SDUPTHER

## 2017-09-25 RX ORDER — METOPROLOL TARTRATE 25 MG/1
12.5 TABLET, FILM COATED ORAL 2 TIMES DAILY
Qty: 30 TABLET | Refills: 11 | Status: SHIPPED | OUTPATIENT
Start: 2017-09-25 | End: 2018-02-20 | Stop reason: SDUPTHER

## 2017-09-25 RX ORDER — FAMOTIDINE 20 MG/1
20 TABLET, FILM COATED ORAL NIGHTLY
Qty: 30 TABLET | Refills: 11 | Status: SHIPPED | OUTPATIENT
Start: 2017-09-25 | End: 2018-02-20 | Stop reason: SDUPTHER

## 2017-09-25 RX ORDER — SULFAMETHOXAZOLE AND TRIMETHOPRIM 400; 80 MG/1; MG/1
1 TABLET ORAL DAILY
Qty: 30 TABLET | Refills: 11 | Status: SHIPPED | OUTPATIENT
Start: 2017-09-25 | End: 2018-02-05 | Stop reason: SDUPTHER

## 2017-09-25 NOTE — PROGRESS NOTES
Kidney Post-Transplant Assessment    Referring Physician: [place]  Current Nephrologist:     ORGAN: LEFT KIDNEY  Donor Type:  - brain death   PHS Increased Risk: yes  Cold Ischemia: 854 mins  Induction Medications: campath - alemtuzumab (anti-cd52)      Subjective:     CC:  Reassessment of renal allograft function and management of chronic immunosuppression.    Kidney History:  Mr. Burkett is a 67 y.o. 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 Mr. Burkett had  ESRD HD due to HTN  and congenital absent left kidney who was on dialysis since 6/16/10 until receiving PHS increased risk DDRT (pumped kidney, Campath induction, KDPI 36%, WIT 30 minutes, CIT 14 hours and 14 minutes, donor RPR positive thus patient completed PCN x3, CMV D+/R+) on 16. His most recent creatinine is 1.5. He takes mycophenolate mofetil, prednisone and tacrolimus  For Immunosuppression. He is on Bactrim X 1 year      Post Transplant Course:   - Pt had DIVINE with suspected rejection on 17.  Kid bx  revealed AVR  Had thymo x 5-7 doses. DSA neg.  -  - He was rebiopsied on 17 which showed acute tubular injury and vacuolization, with + myoglobulin and negative heme.  - Recurrent UTI  - chronic diarrhea     Interval History:  he denies any chest pain, shortness of breath, ENT symptoms, nausea/vomiting,  frequency, urgency, or pain over the allograft. His home BPs are acceptable. He follows urology for his recurrent UTI, last episode was in May. Patient states that he has had chronic diarrhea which not resolved , GI plans to perform EGD and colonoscopy. His stool test is still pending    Medications:   Current Outpatient Prescriptions   Medication Sig Dispense Refill    ketoconazole (NIZORAL) 200 mg Tab Take 0.5 tablets (100 mg total) by mouth once daily. 15 tablet 11    magnesium oxide (MAG-OX) 400 mg tablet Take 1 tablet (400 mg total) by mouth once  daily. 30 tablet 11    multivitamin (ONE DAILY MULTIVITAMIN) per tablet Take 1 tablet by mouth once daily.      mycophenolate (CELLCEPT) 250 mg Cap Take 3 capsules (750 mg total) by mouth 2 (two) times daily. Z94.0/Kidney Transplant on 11/26/16 180 capsule 11    predniSONE (DELTASONE) 5 MG tablet Take 1 tablet (5 mg total) by mouth once daily. Z94.0/Kidney transplant on 11/26/2016 30 tablet 11    famotidine (PEPCID) 20 MG tablet Take 1 tablet (20 mg total) by mouth every evening. 30 tablet 11    k phos di & mono-sod phos mono (K-PHOS-NEUTRAL) 250 mg Tab Take 4 tablets by mouth 4 (four) times daily. 480 tablet 11    levothyroxine (SYNTHROID) 112 MCG tablet Take 1 tablet (112 mcg total) by mouth once daily. 30 tablet 6    metoprolol tartrate (LOPRESSOR) 25 MG tablet Take 0.5 tablets (12.5 mg total) by mouth 2 (two) times daily. 30 tablet 11    oxycodone-acetaminophen (PERCOCET) 5-325 mg per tablet Take 1 tablet by mouth every 4 (four) hours as needed for Pain. 10 tablet 0    sodium bicarbonate 650 MG tablet Take 3 tablets (1,950 mg total) by mouth 2 (two) times daily. 180 tablet 11    sulfamethoxazole-trimethoprim 400-80mg (BACTRIM,SEPTRA) 400-80 mg per tablet Take 1 tablet by mouth once daily. Stop: 11/26/17 30 tablet 11    tacrolimus (PROGRAF) 1 MG Cap Take 4 capsules (4 mg total) by mouth every 12 (twelve) hours. Z94.0/Kidney Transplant on 11/26/16 240 capsule 11     No current facility-administered medications for this visit.            Review of Systems  Constitutional: Negative for fever, appetite change and fatigue.    Respiratory: Negative for cough, chest tightness, shortness of breath and wheezing.   Cardiovascular: Negative for chest pain, palpitations and leg swelling.   Gastrointestinal: Negative for nausea, vomiting, abdominal pain, constipation, blood in stool and abdominal distention. + diarrhea 5 times per day  Genitourinary: Negative for urgency, frequency, hematuria, decreased urine  "volume, difficulty urinating.  Hematological: Negative for adenopathy. Does not bruise/bleed easily.   Psychiatric/Behavioral: Negative for confusion, sleep disturbance and dysphoric mood. The patient is not nervous/anxious.        Objective:     Blood pressure 111/79, pulse 74, temperature 98.6 °F (37 °C), temperature source Oral, resp. rate 16, height 5' 11" (1.803 m), weight 66.8 kg (147 lb 4.3 oz), SpO2 100 %.  body mass index is 20.54 kg/m².    Physical Exam    Respiratory: Clear to auscultation bilaterally, respirations unlabored, no rales/rhonchi/wheezing  Cardiovacular: Regular rate and rhythm, S1, S2 normal, no murmurs, rubs or gallops, no carotid bruit  Gastrointestinal: Soft, non-tender, bowel sounds normal, no hepatosplenomegaly  Renal allograft exam: No tenderness, no bruits, normal exam  Musculoskeletal: No knee or ankle joint tenderness or swelling.   Extremities: No clubbing or cyanosis of bilateral upper extremities; no lower extremity edema bilaterally, radial pulses 2+ bilaterally, symmetric  Skin: warm and dry; no rash on exposed skin  Neurologic: CN grossly intact, alert and oriented x 3       Labs:  Lab Results   Component Value Date    WBC 4.43 09/12/2017    HGB 12.1 (L) 09/12/2017    HCT 37.1 (L) 09/12/2017     09/12/2017    K 3.8 09/12/2017     09/12/2017    CO2 22 (L) 09/12/2017    BUN 21 09/12/2017    CREATININE 1.5 (H) 09/12/2017    EGFRNONAA 47.8 (A) 09/12/2017    CALCIUM 8.9 09/12/2017    PHOS 2.9 09/12/2017    MG 2.0 09/12/2017    ALBUMIN 3.4 (L) 09/12/2017    AST 31 08/02/2017    ALT 33 08/02/2017    UTPCR 0.09 08/02/2017    .0 (H) 05/02/2017    TACROLIMUS 7.7 09/12/2017    CYCLOSPORINE <10 (L) 01/10/2017       Assessment/Plan:   No diagnosis found.    Mr. Burkett is a 67 y.o. male with:     # History of ESRD presumed secondary to HTN and congenital solitary kidney  - s/p DDRT on 11/26/16  - Kid bx 1/6/17 revealed AVR. Had thymo x 5 doses. DSA eg.    - Kidney " rebiopsy on 1/23/17 showed acute tubular injury and vacuolization, with + myoglobulin and negative heme.  - last Cr 1.5  - non significant proteinuria    # Immunosuppression:   - continue Prograf at the current dose, recently adjusted  - continue  mg BID   - continue prednisone   - will monitor closely for toxicities     # Antimicrobial Prophylaxis:   - continue bactrim for PJP prophylaxis   - continue Valcyte for CMV prophylaxis   - continue nystatin for thrush prophylaxis for 1 month      # History of recurrent UTI  - has history of urethral stricture in the past  - recurrent UTI after surgery, last one was in May 2017  - follow with his urologist     # Infectious Surveillance:   - last CMV : negative  - last BK PCR : negative    # HTN:   - BPs well controlled   - continue with home blood pressure monitoring  - low salt and healthy life discussed with the patient     # Metabolic Bone Disease/Secondary Hyperparathyroidism:  - stable Ca, PO4 and Mg  - will monitor PTH, calcium, and phosphorus as per our center protocol     # Anemia of chronic disease:   - Hb stable  - will continue monitoring as per our center guidelines. No indication for acute intervention today     # Hyperuricemia of 7.9  - on low purine diet  - will monitor uric acid    # Chronic diarrhea  - plan for colonoscopy and EGD  - pending stool study ( patient needs to drop the sample off to lab)    # Erection dysfunction  - check testosterone, free t3, t4 and TSH  - urology follow up          Plan:  - Check cylax and UPCR, testosterone, TSH, Free T4, T3 with next lab  - Follow with your urologist for history recurrent UTI  - To clarify his MMF dose  - Plan for colonoscopy due to diarrhea  - Pending stool study       Chasidy Mcclain MD

## 2017-09-25 NOTE — LETTER
September 25, 2017                     [address]  [address]  [facility] [address]  Phone: [phone]   Patient: Alin Burkett   MR Number: [id number]   YOB: 1950   Date of Visit: 9/25/2017       Dear      Thank you for referring Alin Burkett to me for evaluation. Attached you will find relevant portions of my assessment and plan of care.    If you have questions, please do not hesitate to call me. I look forward to following Alin Burkett along with you.    Sincerely,    Chasidy Mcclain MD    Enclosure    If you would like to receive this communication electronically, please contact [email or web address] or [phone] to request [facility] [employer] access.    [facility] [employer] is a tool which provides read-only access to select patient information with whom you have a relationship. Its easy to use and provides real time access to review your patients record including encounter summaries, notes, results, and demographic information.    If you feel you have received this communication in error or would no longer like to receive these types of communications, please e-mail [email or web address]

## 2017-09-25 NOTE — PATIENT INSTRUCTIONS
Please:    - Increase water intake  - Drop your stool sample for C. Diff and stool study  - Colonoscopy November 1st  - Call Lisa to clarify the dose of myfortic  - Monitor your blood pressure and aim to keep < 140/90  - Follow a low sodium diet. It is hard to do, but helps keep blood pressure controlled and prevents swelling (edema).   - Avoid nonsteroidal anti-inflamatory drugs (NSAIDS): ibuprofen (Advil, Motrin), naproxen (Aleve, Naprosyn), Indomethacin (Indocin).   - Check any new medications (over the counter or prescription) to make sure they will not affect your new kidney.   - Follow regular routine health maintenance as appropriate for you (i.e. prostate examination colonoscopy after age 50).  - See a dermatologist for skin screening test annually as you have a higher risk of skin  cancer after transplant.  - See your nephrologist regularly and follow his/her recommendations.  - Follow with your urologist for history UTI

## 2017-09-26 ENCOUNTER — TELEPHONE (OUTPATIENT)
Dept: TRANSPLANT | Facility: CLINIC | Age: 67
End: 2017-09-26

## 2017-09-26 NOTE — TELEPHONE ENCOUNTER
Patient called to inform transplant nephrologist of his current dose of cellcept. Patient reports he's currently taking Cellcept 250mg three capsules 3 x daily. Instructed patient to continue taking Cellcept as prescribed. Patient verbalized understanding.

## 2017-09-30 ENCOUNTER — HOSPITAL ENCOUNTER (EMERGENCY)
Facility: HOSPITAL | Age: 67
Discharge: HOME | End: 2017-09-30
Attending: EMERGENCY MEDICINE
Payer: MEDICARE

## 2017-09-30 VITALS
TEMPERATURE: 99 F | BODY MASS INDEX: 20.16 KG/M2 | WEIGHT: 144 LBS | DIASTOLIC BLOOD PRESSURE: 63 MMHG | HEART RATE: 64 BPM | RESPIRATION RATE: 20 BRPM | HEIGHT: 71 IN | SYSTOLIC BLOOD PRESSURE: 104 MMHG | OXYGEN SATURATION: 99 %

## 2017-09-30 DIAGNOSIS — L03.114 CELLULITIS OF LEFT UPPER EXTREMITY: Primary | ICD-10-CM

## 2017-09-30 PROCEDURE — 99283 EMERGENCY DEPT VISIT LOW MDM: CPT

## 2017-09-30 RX ORDER — CEPHALEXIN 500 MG/1
500 CAPSULE ORAL 4 TIMES DAILY
Qty: 20 CAPSULE | Refills: 0 | Status: SHIPPED | OUTPATIENT
Start: 2017-09-30 | End: 2017-10-05

## 2017-09-30 NOTE — DISCHARGE INSTRUCTIONS
Take antibiotics as prescribed.  See your primary care provider or Dr. Ruiz within one week.  For worsening symptoms, chest pain, shortness of breath, increased abdominal pain, high grade fever, stroke or stroke like symptoms, immediately go to the nearest Emergency Room or call 911 as soon as possible.

## 2017-09-30 NOTE — ED PROVIDER NOTES
Encounter Date: 2017       History     Chief Complaint   Patient presents with    Vascular Access Problem     Patient is a 67 year old male who presents with swelling to his fistula. He reports PMH significant for ESRD s/p renal transplant (), hypertension, hypothyroidism and adrenal adenoma. He states he has not used the fistula since  when he received his renal transplant. He notes over the last few days he has noticed swelling to the fistula with associated erythema. He denied any fever or chills. He denied any warmth to the area. He denied any attempted treatment.       The history is provided by the patient.     Review of patient's allergies indicates:  No Known Allergies  Past Medical History:   Diagnosis Date    Acidosis     Adrenal adenoma     Anemia associated with chronic renal failure     Arrhythmia, onset 2015    Awaiting organ transplant status 2013    Basal cell carcinoma 2012    left nasal tip    Blood type B+ 2013    Cancer     Celiac artery dissection     Chronic diarrhea     Chronic urethral stricture     Congenital absence of kidney     left    -donor kidney transplant 16     Induced w Campath 30 mg IV intraoperatively & SoluMedrol 875 mg total over 3 days.  Renal allograft biopsy 17 (DIVINE): 21 glomeruli, none globally sclerosed, <5% interstitial fibrosis, no ACR, c4d negative, AVR CCT Type 2 (V1 lesion); plan THYMO     Dissecting aortic aneurysm (any part), abdominal     Diverticulosis     Encounter for blood transfusion     ESRD (end stage renal disease) 2010    H/O: urethral stricture     History of AAA (abdominal aortic aneurysm) repair     Hypertension     Hypokalemia     Hypothyroidism 1/10/2014    Inguinal hernia bilateral, non-recurrent     Kidney stones     Organ transplant candidate 2013    Plantar warts 1/10/2014    Recurrent UTI 2017    S/P kidney transplant     Secondary  hyperparathyroidism, renal     Thyroid disease      Past Surgical History:   Procedure Laterality Date    ABDOMINAL SURGERY      exploratory lapatomy x 2    AORTA - SUPERIOR MESENTERIC ARTERY BYPASS GRAFT      BLADDER NECK RECONSTRUCTION      BLADDER SURGERY      COLONOSCOPY  10/10/2013    Dr. Gutierrez, repeat in 5 years    CYSTOSCOPY      GASTROJEJUNOSTOMY      HEMORRHOID SURGERY      HERNIA REPAIR      KIDNEY TRANSPLANT      LITHOTRIPSY      PERCUTANEOUS NEPHROLITHOTRIPSY      right  ESWL  10/31/12    right ESWL  6/26/12     Family History   Problem Relation Age of Onset    Diabetes Mother     Cancer Brother      thyroid cancer    Stroke Maternal Aunt     Kidney disease Paternal Uncle     Kidney disease Cousin     Melanoma Neg Hx     Psoriasis Neg Hx     Lupus Neg Hx     Eczema Neg Hx     Colon cancer Neg Hx     Colon polyps Neg Hx     Crohn's disease Neg Hx     Ulcerative colitis Neg Hx     Celiac disease Neg Hx      Social History   Substance Use Topics    Smoking status: Former Smoker     Years: 40.00     Types: Cigarettes     Quit date: 6/16/2010    Smokeless tobacco: Never Used    Alcohol use No      Comment: stopped ETOH in 2010     Review of Systems   Constitutional: Negative for chills and fever.   HENT: Negative for congestion, rhinorrhea and sore throat.    Respiratory: Negative for cough, chest tightness and shortness of breath.    Cardiovascular: Negative for chest pain.   Gastrointestinal: Negative for abdominal pain, diarrhea, nausea and vomiting.   Genitourinary: Negative for dysuria and frequency.   Musculoskeletal: Negative for back pain, neck pain and neck stiffness.        Left upper extremity    Skin: Positive for color change. Negative for rash.   Neurological: Negative for dizziness, syncope, numbness and headaches.            Physical Exam     Initial Vitals [09/30/17 1218]   BP Pulse Resp Temp SpO2   104/63 64 20 99.3 °F (37.4 °C) 99 %      MAP       76.67      "    Physical Exam    Constitutional: Vital signs are normal. He appears well-developed and well-nourished. He is cooperative.  Non-toxic appearance. He does not have a sickly appearance.   HENT:   Head: Normocephalic and atraumatic.   Right Ear: External ear normal.   Left Ear: External ear normal.   Nose: Nose normal.   Mouth/Throat: Oropharynx is clear and moist.   Eyes: Conjunctivae and lids are normal. Pupils are equal, round, and reactive to light.   Neck: Normal range of motion and full passive range of motion without pain. Neck supple.   Cardiovascular: Normal rate and regular rhythm.   Pulmonary/Chest: Breath sounds normal. No respiratory distress. He has no wheezes.   Abdominal: Soft. Normal appearance. There is no tenderness. There is no rigidity, no rebound and no guarding.   Musculoskeletal:        Arms:  Fistula to left upper extremity. Pulses noted. Sensation intact. Cap refill < 3 seconds. There is some mild erythema and warmth noted around the fistula.   Neurological: He is alert and oriented to person, place, and time.   Skin: Skin is warm, dry and intact. No rash noted.         ED Course   Procedures  Labs Reviewed - No data to display          Medical Decision Making:   History:   Old Medical Records: I decided to obtain old medical records.       APC / Resident Notes:   This is an urgent evaluation of a 67 year old male who presents with "swelling" to fistula on the left upper extremity. He has a good radial pulse. There is mild erythema and warmth. There is no significant swelling. No vascular compromise at this time. He is a kidney transplant patient and is on immunosuppressives. Secondary to this, will treat with antibiotics due to erythema and concern for possible cellulitis. Discussed results with patient. Return precautions given. Patient is to follow up with their primary care provider. Case was discussed with Dr. Cornelius who has evaluated the patient and is in agreement with the plan " of care. All questions answered.                 ED Course      Clinical Impression:   The encounter diagnosis was Cellulitis of left upper extremity.                           Doris Smith PA-C  09/30/17 1646       Doris Smith PA-C  09/30/17 1655

## 2017-10-03 ENCOUNTER — LAB VISIT (OUTPATIENT)
Dept: LAB | Facility: HOSPITAL | Age: 67
End: 2017-10-03
Attending: INTERNAL MEDICINE
Payer: MEDICARE

## 2017-10-03 DIAGNOSIS — N52.9 ERECTILE DYSFUNCTION, UNSPECIFIED ERECTILE DYSFUNCTION TYPE: ICD-10-CM

## 2017-10-03 DIAGNOSIS — R53.83 FATIGUE, UNSPECIFIED TYPE: ICD-10-CM

## 2017-10-03 DIAGNOSIS — Z94.0 KIDNEY REPLACED BY TRANSPLANT: ICD-10-CM

## 2017-10-03 LAB
ALBUMIN SERPL BCP-MCNC: 3.1 G/DL
ANION GAP SERPL CALC-SCNC: 9 MMOL/L
BASOPHILS # BLD AUTO: 0 K/UL
BASOPHILS NFR BLD: 0 %
BUN SERPL-MCNC: 18 MG/DL
CALCIUM SERPL-MCNC: 9.4 MG/DL
CHLORIDE SERPL-SCNC: 108 MMOL/L
CO2 SERPL-SCNC: 22 MMOL/L
CREAT SERPL-MCNC: 1.5 MG/DL
DIFFERENTIAL METHOD: ABNORMAL
EOSINOPHIL # BLD AUTO: 0.1 K/UL
EOSINOPHIL NFR BLD: 1.2 %
ERYTHROCYTE [DISTWIDTH] IN BLOOD BY AUTOMATED COUNT: 13.6 %
EST. GFR  (AFRICAN AMERICAN): 55.3 ML/MIN/1.73 M^2
EST. GFR  (NON AFRICAN AMERICAN): 47.8 ML/MIN/1.73 M^2
GLUCOSE SERPL-MCNC: 92 MG/DL
HCT VFR BLD AUTO: 38.2 %
HGB BLD-MCNC: 12.3 G/DL
LYMPHOCYTES # BLD AUTO: 0.7 K/UL
LYMPHOCYTES NFR BLD: 12.1 %
MAGNESIUM SERPL-MCNC: 1.8 MG/DL
MCH RBC QN AUTO: 31.1 PG
MCHC RBC AUTO-ENTMCNC: 32.2 G/DL
MCV RBC AUTO: 97 FL
MONOCYTES # BLD AUTO: 0.5 K/UL
MONOCYTES NFR BLD: 9 %
NEUTROPHILS # BLD AUTO: 4.5 K/UL
NEUTROPHILS NFR BLD: 77.2 %
PHOSPHATE SERPL-MCNC: 2.1 MG/DL
PLATELET # BLD AUTO: 147 K/UL
PMV BLD AUTO: 11.7 FL
POTASSIUM SERPL-SCNC: 3.4 MMOL/L
RBC # BLD AUTO: 3.96 M/UL
SODIUM SERPL-SCNC: 139 MMOL/L
T4 FREE SERPL-MCNC: 1.08 NG/DL
TESTOST SERPL-MCNC: 543 NG/DL
TSH SERPL DL<=0.005 MIU/L-ACNC: 0.3 UIU/ML
WBC # BLD AUTO: 5.79 K/UL

## 2017-10-03 PROCEDURE — 84403 ASSAY OF TOTAL TESTOSTERONE: CPT

## 2017-10-03 PROCEDURE — 80069 RENAL FUNCTION PANEL: CPT

## 2017-10-03 PROCEDURE — 80197 ASSAY OF TACROLIMUS: CPT

## 2017-10-03 PROCEDURE — 85025 COMPLETE CBC W/AUTO DIFF WBC: CPT

## 2017-10-03 PROCEDURE — 84481 FREE ASSAY (FT-3): CPT

## 2017-10-03 PROCEDURE — 86352 CELL FUNCTION ASSAY W/STIM: CPT

## 2017-10-03 PROCEDURE — 36415 COLL VENOUS BLD VENIPUNCTURE: CPT | Mod: PO

## 2017-10-03 PROCEDURE — 84443 ASSAY THYROID STIM HORMONE: CPT

## 2017-10-03 PROCEDURE — 84439 ASSAY OF FREE THYROXINE: CPT

## 2017-10-03 PROCEDURE — 83735 ASSAY OF MAGNESIUM: CPT

## 2017-10-04 ENCOUNTER — HOSPITAL ENCOUNTER (EMERGENCY)
Facility: HOSPITAL | Age: 67
Discharge: HOME | End: 2017-10-04
Attending: EMERGENCY MEDICINE
Payer: MEDICARE

## 2017-10-04 VITALS
WEIGHT: 144 LBS | SYSTOLIC BLOOD PRESSURE: 163 MMHG | TEMPERATURE: 98 F | OXYGEN SATURATION: 96 % | HEART RATE: 76 BPM | BODY MASS INDEX: 20.16 KG/M2 | HEIGHT: 71 IN | DIASTOLIC BLOOD PRESSURE: 89 MMHG | RESPIRATION RATE: 18 BRPM

## 2017-10-04 DIAGNOSIS — I80.8 THROMBOPHLEBITIS ARM: Primary | ICD-10-CM

## 2017-10-04 DIAGNOSIS — M79.602 LEFT ARM PAIN: ICD-10-CM

## 2017-10-04 LAB
T3FREE SERPL-MCNC: 2 PG/ML
TACROLIMUS BLD-MCNC: 5.3 NG/ML

## 2017-10-04 PROCEDURE — 99283 EMERGENCY DEPT VISIT LOW MDM: CPT | Mod: 25

## 2017-10-04 PROCEDURE — 96372 THER/PROPH/DIAG INJ SC/IM: CPT

## 2017-10-04 PROCEDURE — 63600175 PHARM REV CODE 636 W HCPCS: Performed by: EMERGENCY MEDICINE

## 2017-10-04 RX ORDER — OXYCODONE AND ACETAMINOPHEN 5; 325 MG/1; MG/1
1 TABLET ORAL EVERY 4 HOURS PRN
Qty: 18 TABLET | Refills: 0 | Status: SHIPPED | OUTPATIENT
Start: 2017-10-04 | End: 2017-11-24

## 2017-10-04 RX ORDER — DEXAMETHASONE SODIUM PHOSPHATE 4 MG/ML
8 INJECTION, SOLUTION INTRA-ARTICULAR; INTRALESIONAL; INTRAMUSCULAR; INTRAVENOUS; SOFT TISSUE
Status: COMPLETED | OUTPATIENT
Start: 2017-10-04 | End: 2017-10-04

## 2017-10-04 RX ORDER — HYDROMORPHONE HYDROCHLORIDE 1 MG/ML
1 INJECTION, SOLUTION INTRAMUSCULAR; INTRAVENOUS; SUBCUTANEOUS
Status: COMPLETED | OUTPATIENT
Start: 2017-10-04 | End: 2017-10-04

## 2017-10-04 RX ADMIN — HYDROMORPHONE HYDROCHLORIDE 1 MG: 1 INJECTION, SOLUTION INTRAMUSCULAR; INTRAVENOUS; SUBCUTANEOUS at 01:10

## 2017-10-04 RX ADMIN — DEXAMETHASONE SODIUM PHOSPHATE 8 MG: 4 INJECTION, SOLUTION INTRAMUSCULAR; INTRAVENOUS at 01:10

## 2017-10-04 NOTE — ED PROVIDER NOTES
"Encounter Date: 10/4/2017    SCRIBE #1 NOTE: I, Adalgisa Epstein, am scribing for, and in the presence of, Dr. Menjivar.       History     Chief Complaint   Patient presents with    Vascular Access Problem     seen per Dr Ruiz " clotted "  , appt. at 0900 tomorrow , patient states ' increasing in size "       10/04/2017 12:21 PM     Chief complaint: Arm swelling       Alin Burkett is a 67 y.o. male with HTN and ESRD who presents to the ED with an onset of worsening left arm swelling around the AV fistula over the last several days with associated pain, erythema, and warmth to touch. He was seen in the ER 4 days ago, discharged with Keflex 500 mg, and referred to seen Dr. Ruiz for a suspected clot. The patient was seen by Dr. Ruiz, who ordered an US and an XR of the LUE, 3 days ago and has an appointment tomorrow with him but was unable to tolerate the swelling and the pain. He denies any other symptoms at this time. No pertinent SHx noted.       The history is provided by the patient.     Review of patient's allergies indicates:  No Known Allergies  Past Medical History:   Diagnosis Date    Acidosis     Adrenal adenoma     Anemia associated with chronic renal failure     Arrhythmia, onset 2015    Awaiting organ transplant status 2013    Basal cell carcinoma 2012    left nasal tip    Blood type B+ 2013    Cancer     Celiac artery dissection     Chronic diarrhea     Chronic urethral stricture     Congenital absence of kidney     left    -donor kidney transplant 16     Induced w Campath 30 mg IV intraoperatively & SoluMedrol 875 mg total over 3 days.  Renal allograft biopsy 17 (DIVINE): 21 glomeruli, none globally sclerosed, <5% interstitial fibrosis, no ACR, c4d negative, AVR CCT Type 2 (V1 lesion); plan THYMO     Dissecting aortic aneurysm (any part), abdominal     Diverticulosis     Encounter for blood transfusion     ESRD (end stage renal disease) " 06/16/2010    H/O: urethral stricture     History of AAA (abdominal aortic aneurysm) repair     Hypertension     Hypokalemia     Hypothyroidism 1/10/2014    Inguinal hernia bilateral, non-recurrent     Kidney stones     Organ transplant candidate 11/26/2013    Plantar warts 1/10/2014    Recurrent UTI 7/28/2017    S/P kidney transplant     Secondary hyperparathyroidism, renal     Thyroid disease      Past Surgical History:   Procedure Laterality Date    ABDOMINAL SURGERY      exploratory lapatomy x 2    AORTA - SUPERIOR MESENTERIC ARTERY BYPASS GRAFT      BLADDER NECK RECONSTRUCTION      BLADDER SURGERY      COLONOSCOPY  10/10/2013    Dr. Gutierrez, repeat in 5 years    CYSTOSCOPY      GASTROJEJUNOSTOMY      HEMORRHOID SURGERY      HERNIA REPAIR      KIDNEY TRANSPLANT      LITHOTRIPSY      PERCUTANEOUS NEPHROLITHOTRIPSY      right  ESWL  10/31/12    right ESWL  6/26/12     Family History   Problem Relation Age of Onset    Diabetes Mother     Cancer Brother      thyroid cancer    Stroke Maternal Aunt     Kidney disease Paternal Uncle     Kidney disease Cousin     Melanoma Neg Hx     Psoriasis Neg Hx     Lupus Neg Hx     Eczema Neg Hx     Colon cancer Neg Hx     Colon polyps Neg Hx     Crohn's disease Neg Hx     Ulcerative colitis Neg Hx     Celiac disease Neg Hx      Social History   Substance Use Topics    Smoking status: Former Smoker     Years: 40.00     Types: Cigarettes     Quit date: 6/16/2010    Smokeless tobacco: Never Used    Alcohol use No      Comment: stopped ETOH in 2010     Review of Systems   Constitutional: Negative for chills and fever.   HENT: Negative for nosebleeds.    Eyes: Negative for visual disturbance.   Respiratory: Negative for cough and shortness of breath.    Cardiovascular: Negative for chest pain and palpitations.   Gastrointestinal: Negative for abdominal pain, diarrhea, nausea and vomiting.   Genitourinary: Negative for dysuria and hematuria.    Musculoskeletal: Positive for myalgias (left arm). Negative for back pain and neck pain.        Positive for swelling (left arm).   Skin: Positive for color change (erythema). Negative for rash.        Positive for warmth to touch.    Neurological: Negative for seizures, syncope and headaches.     Physical Exam     Initial Vitals [10/04/17 1203]   BP Pulse Resp Temp SpO2   (!) 178/81 79 20 98 °F (36.7 °C) 98 %      MAP       113.33         Physical Exam    Nursing note and vitals reviewed.  Constitutional: He appears well-developed and well-nourished.  Non-toxic appearance. No distress.   HENT:   Head: Normocephalic and atraumatic.   Eyes: EOM are normal. Pupils are equal, round, and reactive to light.   Neck: Normal range of motion. Neck supple. No neck rigidity. No JVD present.   Cardiovascular: Normal rate, regular rhythm, normal heart sounds and intact distal pulses. Exam reveals no gallop and no friction rub.    No murmur heard.  Pulses:       Radial pulses are 2+ on the left side.   Normal pulses distally.    Pulmonary/Chest: Breath sounds normal. He has no wheezes. He has no rhonchi. He has no rales.   Abdominal: Soft. Bowel sounds are normal. He exhibits no distension. There is no tenderness. There is no rigidity, no rebound and no guarding.   Musculoskeletal: Normal range of motion.   Firm left arm AV fistula with tenderness and localized erythema. No thrill or bruit. No other swelling to the LUE.    Neurological: He is alert and oriented to person, place, and time. He has normal strength and normal reflexes. No cranial nerve deficit or sensory deficit. He exhibits normal muscle tone. Coordination normal. GCS eye subscore is 4. GCS verbal subscore is 5. GCS motor subscore is 6.   Skin: Skin is warm and dry.   Psychiatric: He has a normal mood and affect. His speech is normal and behavior is normal. He is not actively hallucinating.       ED Course   Procedures  Labs Reviewed - No data to display         Medical Decision Making:   History:   Old Medical Records: I decided to obtain old medical records.  ED Management:  12:35 PM - Patient with left arm pain, swelling, pain, and redness. Patient history and PE are consistent with a superficial thrombophlebitis and AV fistula thrombosis. Dr. Ruiz, who suggests outpatient treatment and follow-up. Patient to apply warm compression, shot of steroids, and pain medication.  Patient had outpatient ultrasound and x-ray performed.  He will follow-up in Dr. Ruiz's office tomorrow morning at 9 AM. I do not believe this is a DVT or cellulitis.  Patient is afebrile.  There is no induration.  I doubt abscess formation.  He is given IM Dilaudid in the emergency department and 8 mg of Decadron.  I will prescribe him Percocet 5 mg tablets for his pain and instructed to apply warm compresses.  Return precautions for worsening symptoms, specially development of fever.            Scribe Attestation:   Scribe #1: I performed the above scribed service and the documentation accurately describes the services I performed. I attest to the accuracy of the note.            ED Course      Clinical Impression:     1. Thrombophlebitis arm    2. Left arm pain          Disposition:   Disposition: Discharged  Condition: Stable                        Vimal Menjivar MD  10/04/17 5298

## 2017-10-05 LAB — IMMUNKNOW (STIMULATED): 183 NG/ML

## 2017-10-11 ENCOUNTER — LAB VISIT (OUTPATIENT)
Dept: LAB | Facility: HOSPITAL | Age: 67
End: 2017-10-11
Attending: NURSE PRACTITIONER
Payer: MEDICARE

## 2017-10-11 DIAGNOSIS — K52.9 CHRONIC DIARRHEA: ICD-10-CM

## 2017-10-11 PROCEDURE — 83986 ASSAY PH BODY FLUID NOS: CPT

## 2017-10-11 PROCEDURE — 89125 SPECIMEN FAT STAIN: CPT

## 2017-10-11 PROCEDURE — 82272 OCCULT BLD FECES 1-3 TESTS: CPT

## 2017-10-11 PROCEDURE — 87046 STOOL CULTR AEROBIC BACT EA: CPT

## 2017-10-11 PROCEDURE — 87301 ADENOVIRUS AG IA: CPT

## 2017-10-11 PROCEDURE — 87209 SMEAR COMPLEX STAIN: CPT

## 2017-10-11 PROCEDURE — 87427 SHIGA-LIKE TOXIN AG IA: CPT

## 2017-10-11 PROCEDURE — 87449 NOS EACH ORGANISM AG IA: CPT

## 2017-10-11 PROCEDURE — 87425 ROTAVIRUS AG IA: CPT

## 2017-10-11 PROCEDURE — 87045 FECES CULTURE AEROBIC BACT: CPT

## 2017-10-11 PROCEDURE — 87329 GIARDIA AG IA: CPT

## 2017-10-11 PROCEDURE — 89055 LEUKOCYTE ASSESSMENT FECAL: CPT

## 2017-10-12 LAB
C DIFF GDH STL QL: NEGATIVE
C DIFF TOX A+B STL QL IA: NEGATIVE
CRYPTOSP AG STL QL IA: NEGATIVE
FAT STL SUDAN IV STN: NORMAL
G LAMBLIA AG STL QL IA: NEGATIVE
O+P STL TRI STN: NORMAL
OB PNL STL: NEGATIVE
PH STL: NORMAL [PH]
RV AG STL QL IA.RAPID: NEGATIVE
WBC #/AREA STL HPF: NORMAL /[HPF]

## 2017-10-13 ENCOUNTER — TELEPHONE (OUTPATIENT)
Dept: TRANSPLANT | Facility: CLINIC | Age: 67
End: 2017-10-13

## 2017-10-13 DIAGNOSIS — R79.89 LOW TSH LEVEL: Primary | ICD-10-CM

## 2017-10-13 LAB
E COLI SXT1 STL QL IA: NEGATIVE
E COLI SXT2 STL QL IA: NEGATIVE

## 2017-10-13 NOTE — TELEPHONE ENCOUNTER
----- Message from Chasidy Mcclain MD sent at 10/4/2017 11:03 AM CDT -----  Endo consult: low TSH and low T3. Needs further action. Thank you!

## 2017-10-16 ENCOUNTER — LAB VISIT (OUTPATIENT)
Dept: LAB | Facility: HOSPITAL | Age: 67
End: 2017-10-16
Attending: INTERNAL MEDICINE
Payer: MEDICARE

## 2017-10-16 DIAGNOSIS — Z94.0 KIDNEY REPLACED BY TRANSPLANT: ICD-10-CM

## 2017-10-16 LAB — BACTERIA STL CULT: NORMAL

## 2017-10-16 PROCEDURE — 80197 ASSAY OF TACROLIMUS: CPT

## 2017-10-16 PROCEDURE — 36415 COLL VENOUS BLD VENIPUNCTURE: CPT | Mod: PO

## 2017-10-17 LAB
HADV AG STL QL IA: NOT DETECTED
TACROLIMUS BLD-MCNC: 10.2 NG/ML

## 2017-10-17 NOTE — PROGRESS NOTES
The dose was not changed but tac levels are all over the place. Please recheck it in 1-2 weeks. Thank you!

## 2017-10-23 ENCOUNTER — TELEPHONE (OUTPATIENT)
Dept: ENDOCRINOLOGY | Facility: CLINIC | Age: 67
End: 2017-10-23

## 2017-11-07 ENCOUNTER — LAB VISIT (OUTPATIENT)
Dept: LAB | Facility: HOSPITAL | Age: 67
End: 2017-11-07
Attending: INTERNAL MEDICINE
Payer: MEDICARE

## 2017-11-07 DIAGNOSIS — Z94.0 KIDNEY REPLACED BY TRANSPLANT: ICD-10-CM

## 2017-11-07 DIAGNOSIS — Z72.89 OTHER PROBLEMS RELATED TO LIFESTYLE: ICD-10-CM

## 2017-11-07 LAB
ALBUMIN SERPL BCP-MCNC: 3.5 G/DL
ALBUMIN SERPL BCP-MCNC: 3.5 G/DL
ALP SERPL-CCNC: 64 U/L
ALT SERPL W/O P-5'-P-CCNC: 28 U/L
ANION GAP SERPL CALC-SCNC: 12 MMOL/L
AST SERPL-CCNC: 25 U/L
BASOPHILS # BLD AUTO: 0.02 K/UL
BASOPHILS NFR BLD: 0.5 %
BILIRUB DIRECT SERPL-MCNC: 0.3 MG/DL
BILIRUB SERPL-MCNC: 0.6 MG/DL
BUN SERPL-MCNC: 17 MG/DL
CALCIUM SERPL-MCNC: 9 MG/DL
CHLORIDE SERPL-SCNC: 109 MMOL/L
CO2 SERPL-SCNC: 23 MMOL/L
CREAT SERPL-MCNC: 1.6 MG/DL
DIFFERENTIAL METHOD: ABNORMAL
EOSINOPHIL # BLD AUTO: 0.1 K/UL
EOSINOPHIL NFR BLD: 2.8 %
ERYTHROCYTE [DISTWIDTH] IN BLOOD BY AUTOMATED COUNT: 13.3 %
EST. GFR  (AFRICAN AMERICAN): 51.1 ML/MIN/1.73 M^2
EST. GFR  (NON AFRICAN AMERICAN): 44.2 ML/MIN/1.73 M^2
GLUCOSE SERPL-MCNC: 82 MG/DL
HCT VFR BLD AUTO: 37.2 %
HGB BLD-MCNC: 11.8 G/DL
IMM GRANULOCYTES # BLD AUTO: 0.01 K/UL
IMM GRANULOCYTES NFR BLD AUTO: 0.3 %
LYMPHOCYTES # BLD AUTO: 0.6 K/UL
LYMPHOCYTES NFR BLD: 16.1 %
MAGNESIUM SERPL-MCNC: 1.7 MG/DL
MCH RBC QN AUTO: 30.5 PG
MCHC RBC AUTO-ENTMCNC: 31.7 G/DL
MCV RBC AUTO: 96 FL
MONOCYTES # BLD AUTO: 0.4 K/UL
MONOCYTES NFR BLD: 9.5 %
NEUTROPHILS # BLD AUTO: 2.8 K/UL
NEUTROPHILS NFR BLD: 70.8 %
NRBC BLD-RTO: 0 /100 WBC
PHOSPHATE SERPL-MCNC: 3 MG/DL
PLATELET # BLD AUTO: 132 K/UL
PMV BLD AUTO: 12 FL
POTASSIUM SERPL-SCNC: 3.4 MMOL/L
PROT SERPL-MCNC: 6.7 G/DL
RBC # BLD AUTO: 3.87 M/UL
SODIUM SERPL-SCNC: 144 MMOL/L
WBC # BLD AUTO: 3.98 K/UL

## 2017-11-07 PROCEDURE — 87799 DETECT AGENT NOS DNA QUANT: CPT

## 2017-11-07 PROCEDURE — 84075 ASSAY ALKALINE PHOSPHATASE: CPT

## 2017-11-07 PROCEDURE — 82247 BILIRUBIN TOTAL: CPT

## 2017-11-07 PROCEDURE — 83735 ASSAY OF MAGNESIUM: CPT

## 2017-11-07 PROCEDURE — 86592 SYPHILIS TEST NON-TREP QUAL: CPT

## 2017-11-07 PROCEDURE — 85025 COMPLETE CBC W/AUTO DIFF WBC: CPT

## 2017-11-07 PROCEDURE — 80197 ASSAY OF TACROLIMUS: CPT

## 2017-11-07 PROCEDURE — 86780 TREPONEMA PALLIDUM: CPT

## 2017-11-07 PROCEDURE — 80069 RENAL FUNCTION PANEL: CPT

## 2017-11-07 NOTE — PROGRESS NOTES
Na of 144: water depletion for 1 liter. Please more water intake  Low K: please encourage for high K diet for few days    Thank you!

## 2017-11-08 ENCOUNTER — TELEPHONE (OUTPATIENT)
Dept: TRANSPLANT | Facility: CLINIC | Age: 67
End: 2017-11-08

## 2017-11-08 LAB
CMV DNA SERPL NAA+PROBE-ACNC: NORMAL IU/ML
RPR SER QL: NORMAL
TACROLIMUS BLD-MCNC: 7 NG/ML

## 2017-11-08 NOTE — TELEPHONE ENCOUNTER
----- Message from Chasidy Mcclain MD sent at 11/7/2017  2:59 PM CST -----  Is she symptomatic for UTI?

## 2017-11-08 NOTE — TELEPHONE ENCOUNTER
----- Message from Chasidy Mcclain MD sent at 11/7/2017  2:54 PM CST -----  Mild leukopenia: Please check CBC in a week.

## 2017-11-08 NOTE — TELEPHONE ENCOUNTER
----- Message from Chasidy Mcclain MD sent at 11/7/2017  5:17 PM CST -----  Na of 144: water depletion for 1 liter. Please more water intake  Low K: please encourage for high K diet for few days    Thank you!

## 2017-11-14 ENCOUNTER — PATIENT MESSAGE (OUTPATIENT)
Dept: TRANSPLANT | Facility: CLINIC | Age: 67
End: 2017-11-14

## 2017-11-15 LAB — T PALLIDUM AB SER QL IF: NORMAL

## 2017-11-20 ENCOUNTER — OFFICE VISIT (OUTPATIENT)
Dept: TRANSPLANT | Facility: CLINIC | Age: 67
End: 2017-11-20
Payer: MEDICARE

## 2017-11-20 VITALS
BODY MASS INDEX: 20.68 KG/M2 | HEART RATE: 72 BPM | OXYGEN SATURATION: 100 % | DIASTOLIC BLOOD PRESSURE: 73 MMHG | TEMPERATURE: 98 F | HEIGHT: 71 IN | RESPIRATION RATE: 16 BRPM | WEIGHT: 147.69 LBS | SYSTOLIC BLOOD PRESSURE: 128 MMHG

## 2017-11-20 DIAGNOSIS — N18.30 STAGE 3 CHRONIC KIDNEY DISEASE: Chronic | ICD-10-CM

## 2017-11-20 DIAGNOSIS — Z94.0 DECEASED-DONOR KIDNEY TRANSPLANT: Primary | ICD-10-CM

## 2017-11-20 DIAGNOSIS — E87.20 METABOLIC ACIDOSIS: ICD-10-CM

## 2017-11-20 DIAGNOSIS — Z29.89 PROPHYLACTIC IMMUNOTHERAPY: ICD-10-CM

## 2017-11-20 DIAGNOSIS — Z79.60 LONG-TERM USE OF IMMUNOSUPPRESSANT MEDICATION: ICD-10-CM

## 2017-11-20 DIAGNOSIS — Z94.0 S/P KIDNEY TRANSPLANT: ICD-10-CM

## 2017-11-20 DIAGNOSIS — E83.39 HYPOPHOSPHATEMIA: ICD-10-CM

## 2017-11-20 DIAGNOSIS — D63.8 ANEMIA OF CHRONIC DISEASE: ICD-10-CM

## 2017-11-20 DIAGNOSIS — N35.9 URETHRAL STRICTURE, UNSPECIFIED STRICTURE TYPE: ICD-10-CM

## 2017-11-20 PROCEDURE — 99214 OFFICE O/P EST MOD 30 MIN: CPT | Mod: S$GLB,,, | Performed by: NURSE PRACTITIONER

## 2017-11-20 PROCEDURE — 99999 PR PBB SHADOW E&M-EST. PATIENT-LVL III: CPT | Mod: PBBFAC,,, | Performed by: NURSE PRACTITIONER

## 2017-11-20 PROCEDURE — 99213 OFFICE O/P EST LOW 20 MIN: CPT | Mod: PBBFAC | Performed by: NURSE PRACTITIONER

## 2017-11-20 NOTE — LETTER
November 20, 2017                     [address]  [address]  [facility] [address]  Phone: [phone]   Patient: Alin Burkett   MR Number: [id number]   YOB: 1950   Date of Visit: 11/20/2017       Dear      Thank you for referring Alin uBrkett to me for evaluation. Attached you will find relevant portions of my assessment and plan of care.    If you have questions, please do not hesitate to call me. I look forward to following Alin Burkett along with you.    Sincerely,    Yuni Rodney, NP    Enclosure    If you would like to receive this communication electronically, please contact [email or web address] or [phone] to request [employer] access.    [employer] is a tool which provides read-only access to select patient information with whom you have a relationship. Its easy to use and provides real time access to review your patients record including encounter summaries, notes, results, and demographic information.    If you feel you have received this communication in error or would no longer like to receive these types of communications, please e-mail [email or web address]

## 2017-11-20 NOTE — PROGRESS NOTES
Kidney Post-Transplant Assessment    Referring Physician: [place]  Current Nephrologist:     ORGAN: LEFT KIDNEY  Donor Type:  - brain death  PHS Increased Risk: yes  Cold Ischemia: 854 mins  Induction Medications: campath - alemtuzumab (anti-cd52)    Subjective:     CC:  Reassessment of renal allograft function and management of chronic immunosuppression.    HPI:  Mr. Burkett is a 67 y.o. year old Black or  male who received a  - brain death kidney transplant on 16.  He has CKD stage 3 - GFR 30-59 and his baseline creatinine is between 1.5-1.6. He takes mycophenolate mofetil, prednisone and tacrolimus for maintenance immunosuppression. He denies any recent hospitalizations or ER visits since his previous clinic visit.  10/4/2017 ED thrombophlebitis arm   2017 ED cellulitis left arm     HX urethral stricture. Is followed by DR Mann/ urology. Reports self cath 3 x / week   2017 cystoscopy / DR Mann  Reports having some blood that drips out of the penis once the cathter is removed. He has a f/u apt with Dr Mann on   He also describes the area on the tip of the penis will get infected at the area of trauma and he will need antibiotics. He has been on Keflex in the past. He recently restarted an old Keflex prescription, which he had a few pills left over. He ran out of pills, and started to take his dogs Keflex prescription. He states the keflex is  the same dose. He was encouraged to stop taking his pets meds and to contact Dr Mann's office prior to his apt on 2017 to discuss his medicine options.     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  Pt had DIVINE with suspected rejection on . Pt did receive 1 dose of SMP after labs drawn. Kid bx  revealed AVR and he was d/c'd home while awaiting results.  He was readmitted  to begin tx with thymo x 5-7 doses. DSA  neg.  He tolerated Thymo dose #1 w/o comp.  Dose  #2 held due to fever 100.5.  No further fever.  Blood cx w NGTD.  U cx resulted (+) enterococcus >100,000.  He will be treated w Zyvox fro total of 10 days. He received Thymo dose #2 on ; Thymo HALF dose #3; Thymo HALF dose #4 and also received Neupogen 480 mcg SC 17; and Thymo full dose #5 on 1/15. Creatinine trended to 1.2. He was rebiopsied on 17 which showed acute tubular injury and vacuolization, with + myoglobulin and negative heme.     Past Medical History:   Diagnosis Date    Acidosis     Adrenal adenoma     Anemia associated with chronic renal failure     Arrhythmia, onset 2015    Awaiting organ transplant status 2013    Basal cell carcinoma 2012    left nasal tip    Blood type B+ 2013    Cancer     Celiac artery dissection     Chronic diarrhea     Chronic urethral stricture     Congenital absence of kidney     left    -donor kidney transplant 16     Induced w Campath 30 mg IV intraoperatively & SoluMedrol 875 mg total over 3 days.  Renal allograft biopsy 17 (DIVINE): 21 glomeruli, none globally sclerosed, <5% interstitial fibrosis, no ACR, c4d negative, AVR CCT Type 2 (V1 lesion); plan THYMO     Dissecting aortic aneurysm (any part), abdominal     Diverticulosis     Encounter for blood transfusion     ESRD (end stage renal disease) 2010    H/O: urethral stricture     History of AAA (abdominal aortic aneurysm) repair     Hypertension     Hypokalemia     Hypothyroidism 1/10/2014    Inguinal hernia bilateral, non-recurrent     Kidney stones     Organ transplant candidate 2013    Plantar warts 1/10/2014    Recurrent UTI 2017    S/P kidney transplant     Secondary hyperparathyroidism, renal     Thyroid disease        Review of Systems   Constitutional: Negative for activity change, appetite change, chills, fatigue, fever and unexpected weight change.   HENT: Negative for congestion, facial swelling, postnasal  "drip, rhinorrhea, sinus pressure, sore throat and trouble swallowing.    Eyes: Negative for pain, redness and visual disturbance.   Respiratory: Negative for cough, chest tightness, shortness of breath and wheezing.    Cardiovascular: Negative.  Negative for chest pain, palpitations and leg swelling.   Gastrointestinal: Negative for abdominal pain, diarrhea, nausea and vomiting.   Genitourinary: Negative for dysuria, flank pain and urgency.        Urethral stricture   Musculoskeletal: Negative for gait problem, neck pain and neck stiffness.   Skin: Negative for rash.   Allergic/Immunologic: Positive for immunocompromised state. Negative for environmental allergies and food allergies.   Neurological: Negative for dizziness, weakness, light-headedness and headaches.   Psychiatric/Behavioral: Negative for agitation and confusion. The patient is not nervous/anxious.        Objective:     Blood pressure 128/73, pulse 72, temperature 98.2 °F (36.8 °C), temperature source Oral, resp. rate 16, height 5' 11" (1.803 m), weight 67 kg (147 lb 11.3 oz), SpO2 100 %.body mass index is 20.6 kg/m².    Physical Exam   Constitutional: He is oriented to person, place, and time. He appears well-developed and well-nourished.   HENT:   Head: Normocephalic.   Mouth/Throat: Oropharynx is clear and moist. No oropharyngeal exudate.   Eyes: Conjunctivae and EOM are normal. Pupils are equal, round, and reactive to light. No scleral icterus.   Neck: Normal range of motion. Neck supple.   Cardiovascular: Normal rate, regular rhythm and normal heart sounds.    Pulmonary/Chest: Effort normal and breath sounds normal.   Abdominal: Soft. Normal appearance and bowel sounds are normal. He exhibits no distension and no mass. There is no splenomegaly or hepatomegaly. There is no tenderness. There is no rebound, no guarding, no CVA tenderness, no tenderness at McBurney's point and negative Blanchard's sign.       Musculoskeletal: Normal range of motion. He " exhibits no edema.   Lymphadenopathy:     He has no cervical adenopathy.   Neurological: He is alert and oriented to person, place, and time. He exhibits normal muscle tone. Coordination normal.   Skin: Skin is warm and dry.   Psychiatric: He has a normal mood and affect. His behavior is normal.   Vitals reviewed.      Labs:  Lab Results   Component Value Date    WBC 3.98 11/07/2017    HGB 11.8 (L) 11/07/2017    HCT 37.2 (L) 11/07/2017     11/07/2017    K 3.4 (L) 11/07/2017     11/07/2017    CO2 23 11/07/2017    BUN 17 11/07/2017    CREATININE 1.6 (H) 11/07/2017    EGFRNONAA 44.2 (A) 11/07/2017    CALCIUM 9.0 11/07/2017    PHOS 3.0 11/07/2017    MG 1.7 11/07/2017    ALBUMIN 3.5 11/07/2017    ALBUMIN 3.5 11/07/2017    AST 25 11/07/2017    ALT 28 11/07/2017    UTPCR 0.11 11/07/2017    .0 (H) 05/02/2017    TACROLIMUS 7.0 11/07/2017    CYCLOSPORINE <10 (L) 01/10/2017     No results found for: EXTANC, EXTWBC, EXTSEGS, EXTPLATELETS, EXTHEMOGLOBI, EXTHEMATOCRI, EXTCREATININ, EXTSODIUM, EXTPOTASSIUM, EXTBUN, EXTCO2, EXTCALCIUM, EXTPHOSPHORU, EXTGLUCOSE, EXTALBUMIN, EXTAST, EXTALT, EXTBILITOTAL, EXTLIPASE, EXTAMYLASE    No results found for: EXTCYCLOSLVL, EXTSIROLIMUS, EXTTACROLVL, EXTPROTCRE, EXTPTHINTACT, EXTPROTEINUA, EXTWBCUA, EXTRBCUA    Current Outpatient Prescriptions   Medication Sig Dispense Refill    famotidine (PEPCID) 20 MG tablet Take 1 tablet (20 mg total) by mouth every evening. 30 tablet 11    k phos di & mono-sod phos mono (K-PHOS-NEUTRAL) 250 mg Tab Take 4 tablets by mouth 4 (four) times daily. 480 tablet 11    ketoconazole (NIZORAL) 200 mg Tab Take 0.5 tablets (100 mg total) by mouth once daily. 15 tablet 11    levothyroxine (SYNTHROID) 112 MCG tablet Take 1 tablet (112 mcg total) by mouth once daily. 30 tablet 6    magnesium oxide (MAG-OX) 400 mg tablet Take 1 tablet (400 mg total) by mouth once daily. 30 tablet 11    metoprolol tartrate (LOPRESSOR) 25 MG tablet Take 0.5 tablets  (12.5 mg total) by mouth 2 (two) times daily. 30 tablet 11    multivitamin (ONE DAILY MULTIVITAMIN) per tablet Take 1 tablet by mouth once daily.      mycophenolate (CELLCEPT) 250 mg Cap Take 3 capsules (750 mg total) by mouth 2 (two) times daily. Z94.0/Kidney Transplant on 16 180 capsule 11    oxycodone-acetaminophen (PERCOCET) 5-325 mg per tablet Take 1 tablet by mouth every 4 (four) hours as needed for Pain. 18 tablet 0    predniSONE (DELTASONE) 5 MG tablet Take 1 tablet (5 mg total) by mouth once daily. Z94.0/Kidney transplant on 2016 30 tablet 11    sodium bicarbonate 650 MG tablet Take 3 tablets (1,950 mg total) by mouth 2 (two) times daily. 180 tablet 11    sulfamethoxazole-trimethoprim 400-80mg (BACTRIM,SEPTRA) 400-80 mg per tablet Take 1 tablet by mouth once daily. Stop: 17 30 tablet 11    tacrolimus (PROGRAF) 1 MG Cap Take 4 capsules (4 mg total) by mouth every 12 (twelve) hours. Z94.0/Kidney Transplant on 16 240 capsule 11     No current facility-administered medications for this visit.        Labs were reviewed with the patient.    Assessment:     1. -donor kidney transplant 16    2. Stage 3 chronic kidney disease    3. Long-term use of immunosuppressant medication    4. S/P kidney transplant    5. Prophylactic immunotherapy (transplant immunosuppression)    6. Metabolic acidosis    7. Hypophosphatemia    8. Anemia of chronic disease    9. Body mass index (BMI) of 20.0-20.9 in adult    10. Urethral stricture, unspecified stricture type        Plan:   F/u with urology 2017    Follow-up:   1. CKD stage: 3 stable    2. Immunosuppression:   Prograf trough 7.0, which is  therapeutic target 5-7. Continue  Prograf 4/4  Mg BID, and Prednisone 5 mg QD. Bactrim 400/80 mg QD for PCP prophylaxis, Ketoconazole 100 mg QD  Will continue to monitor for drug toxicities    3. Allograft Function: Stable. Continue good po hydration.     BL 1.5-1.6  Lab Results    Component Value Date    CREATININE 1.6 (H) 11/07/2017 11/7/2017  eGFR if African American >60 mL/min/1.73 m^2 51.1        4. Hypertension management: stable, advise low salt diet and home BP monitoring    Lopressor 12.5 mg BID    5. Metabolic Bone Disease/Secondary Hyperparathyroidism:stable  Will monitor PTH, CA and Vit D/guidelines,    Lab Results   Component Value Date    .0 (H) 05/02/2017    CALCIUM 9.0 11/07/2017    PHOS 3.0 11/07/2017   Mg ox 400 mg qd, KPN  1000 mg QID      6. Electrolytes:  Will monitor /guidelines     Lab Results   Component Value Date     11/07/2017    K 3.4 (L) 11/07/2017     11/07/2017    CO2 23 11/07/2017   Sodium bicarb 1950 mg bid, low K+ --high K+ diet encouraged         7. Anemia: stable. No need for intervention    Will monitor /guidelines  Lab Results   Component Value Date    WBC 3.98 11/07/2017    HGB 11.8 (L) 11/07/2017    HCT 37.2 (L) 11/07/2017    MCV 96 11/07/2017     (L) 11/07/2017       8.  Cytopenias: no significant cytopenias will monitor as per our guidelines. Medicine list reviewed including potential causes of drug-induced cytopenias  11/7/2017  Gran # 1.8 - 7.7 K/uL 2.8       9.Proteinuria: continue p/c ratio as per guidelines    11/7/2017  Prot/Creat Ratio, Ur 0.00 - 0.20 0.11        10. BK virus infection screening:  will continue to monitor/ guidelines  11/7/2017  Not detected     11/7/2017  Cytomegalovirus PCR, Quant Undetected IU/mL  Undetected        11. Weight education: provided during the clinic visit   Body mass index is 20.6 kg/m².          12.Patient safety education regarding immunosuppression including prophylaxis posttransplant for CMV, PCP : Education provided about vaccination and prevention of infections         Follow-up:   Clinic: return to transplant clinic weekly for the first month after transplant; every 2 weeks during months 2-3; then at 6-, 9-, 12-, 18-, 24-, and 36- months post-transplant to reassess for  complications from immunosuppression toxicity and monitor for rejection.  Annually thereafter.    Labs: since patient remains at high risk for rejection and drug-related complications that warrant close monitoring, labs will be ordered as follows: continue twice weekly CBC, renal panel, and drug level for first month; then same labs once weekly through 3rd month post-transplant.  Urine for UA and protein/creatinine ratio monthly.  Urine BK - PCR at 1-, 3-, 6-, 9-, 12-, 18-, 24-, and 36- months post-transplant.  Hepatic panel at 1-, 2-, 3-, 6-, 9-, 12-, 18-, 24-, and 36- months post-transplant.    Yuni Rodney NP       Education:   Material provided to the patient.  Patient reminded to call with any health changes since these can be early signs of significant complications.  Also, I advised the patient to be sure any new medications or changes of old medications are discussed with either a pharmacist or physician knowledgeable with transplant to avoid rejection/drug toxicity related to significant drug interactions.    UNOS Patient Status  Functional Status: 80% - Normal activity with effort: some symptoms of disease  Physical Capacity: No Limitations

## 2017-11-21 ENCOUNTER — LAB VISIT (OUTPATIENT)
Dept: LAB | Facility: HOSPITAL | Age: 67
End: 2017-11-21
Attending: INTERNAL MEDICINE
Payer: MEDICARE

## 2017-11-21 DIAGNOSIS — Z94.0 KIDNEY REPLACED BY TRANSPLANT: ICD-10-CM

## 2017-11-21 LAB
ALBUMIN SERPL BCP-MCNC: 3.4 G/DL
ANION GAP SERPL CALC-SCNC: 8 MMOL/L
BASOPHILS # BLD AUTO: 0.02 K/UL
BASOPHILS NFR BLD: 0.5 %
BUN SERPL-MCNC: 17 MG/DL
CALCIUM SERPL-MCNC: 9.2 MG/DL
CHLORIDE SERPL-SCNC: 109 MMOL/L
CO2 SERPL-SCNC: 25 MMOL/L
CREAT SERPL-MCNC: 1.5 MG/DL
DIFFERENTIAL METHOD: ABNORMAL
EOSINOPHIL # BLD AUTO: 0.1 K/UL
EOSINOPHIL NFR BLD: 2.1 %
ERYTHROCYTE [DISTWIDTH] IN BLOOD BY AUTOMATED COUNT: 13.5 %
EST. GFR  (AFRICAN AMERICAN): 55.3 ML/MIN/1.73 M^2
EST. GFR  (NON AFRICAN AMERICAN): 47.8 ML/MIN/1.73 M^2
GLUCOSE SERPL-MCNC: 82 MG/DL
HCT VFR BLD AUTO: 37.5 %
HGB BLD-MCNC: 12 G/DL
IMM GRANULOCYTES # BLD AUTO: 0.02 K/UL
IMM GRANULOCYTES NFR BLD AUTO: 0.5 %
LYMPHOCYTES # BLD AUTO: 0.8 K/UL
LYMPHOCYTES NFR BLD: 17.7 %
MCH RBC QN AUTO: 30.7 PG
MCHC RBC AUTO-ENTMCNC: 32 G/DL
MCV RBC AUTO: 96 FL
MONOCYTES # BLD AUTO: 0.4 K/UL
MONOCYTES NFR BLD: 8.3 %
NEUTROPHILS # BLD AUTO: 3 K/UL
NEUTROPHILS NFR BLD: 70.9 %
NRBC BLD-RTO: 0 /100 WBC
PHOSPHATE SERPL-MCNC: 4.1 MG/DL
PLATELET # BLD AUTO: 143 K/UL
PMV BLD AUTO: 11.8 FL
POTASSIUM SERPL-SCNC: 3.8 MMOL/L
RBC # BLD AUTO: 3.91 M/UL
SODIUM SERPL-SCNC: 142 MMOL/L
WBC # BLD AUTO: 4.24 K/UL

## 2017-11-21 PROCEDURE — 80197 ASSAY OF TACROLIMUS: CPT

## 2017-11-21 PROCEDURE — 85025 COMPLETE CBC W/AUTO DIFF WBC: CPT

## 2017-11-21 PROCEDURE — 36415 COLL VENOUS BLD VENIPUNCTURE: CPT | Mod: PO

## 2017-11-21 PROCEDURE — 80069 RENAL FUNCTION PANEL: CPT

## 2017-11-22 LAB — TACROLIMUS BLD-MCNC: 7.7 NG/ML

## 2017-11-24 ENCOUNTER — OFFICE VISIT (OUTPATIENT)
Dept: UROLOGY | Facility: CLINIC | Age: 67
End: 2017-11-24
Payer: MEDICARE

## 2017-11-24 ENCOUNTER — OFFICE VISIT (OUTPATIENT)
Dept: ENDOCRINOLOGY | Facility: CLINIC | Age: 67
End: 2017-11-24
Payer: MEDICARE

## 2017-11-24 VITALS
DIASTOLIC BLOOD PRESSURE: 70 MMHG | HEIGHT: 71 IN | SYSTOLIC BLOOD PRESSURE: 118 MMHG | WEIGHT: 143.94 LBS | BODY MASS INDEX: 20.15 KG/M2 | HEART RATE: 68 BPM

## 2017-11-24 VITALS
BODY MASS INDEX: 20.15 KG/M2 | WEIGHT: 143.94 LBS | DIASTOLIC BLOOD PRESSURE: 70 MMHG | HEART RATE: 68 BPM | SYSTOLIC BLOOD PRESSURE: 118 MMHG | HEIGHT: 71 IN

## 2017-11-24 DIAGNOSIS — Z13.820 SCREENING FOR OSTEOPOROSIS: ICD-10-CM

## 2017-11-24 DIAGNOSIS — E03.9 HYPOTHYROIDISM, UNSPECIFIED TYPE: ICD-10-CM

## 2017-11-24 DIAGNOSIS — N39.0 RECURRENT UTI: ICD-10-CM

## 2017-11-24 DIAGNOSIS — N35.9 URETHRAL STRICTURE, UNSPECIFIED STRICTURE TYPE: Primary | ICD-10-CM

## 2017-11-24 DIAGNOSIS — M89.9 DISORDER OF BONE: ICD-10-CM

## 2017-11-24 DIAGNOSIS — Z92.241 HISTORY OF RECENT STEROID USE: ICD-10-CM

## 2017-11-24 DIAGNOSIS — Z94.0 S/P KIDNEY TRANSPLANT: ICD-10-CM

## 2017-11-24 DIAGNOSIS — E27.8 ADRENAL INCIDENTALOMA: Primary | ICD-10-CM

## 2017-11-24 LAB
BILIRUB SERPL-MCNC: NORMAL MG/DL
BLOOD URINE, POC: NORMAL
COLOR, POC UA: NORMAL
GLUCOSE UR QL STRIP: NORMAL
KETONES UR QL STRIP: NORMAL
LEUKOCYTE ESTERASE URINE, POC: NORMAL
NITRITE, POC UA: NORMAL
PH, POC UA: 7
PROTEIN, POC: NORMAL
SPECIFIC GRAVITY, POC UA: 1.01
UROBILINOGEN, POC UA: NORMAL

## 2017-11-24 PROCEDURE — 99999 PR PBB SHADOW E&M-EST. PATIENT-LVL III: CPT | Mod: PBBFAC,,, | Performed by: INTERNAL MEDICINE

## 2017-11-24 PROCEDURE — 99999 PR PBB SHADOW E&M-EST. PATIENT-LVL III: CPT | Mod: PBBFAC,,, | Performed by: UROLOGY

## 2017-11-24 PROCEDURE — 81002 URINALYSIS NONAUTO W/O SCOPE: CPT | Mod: S$GLB,,, | Performed by: UROLOGY

## 2017-11-24 PROCEDURE — 87088 URINE BACTERIA CULTURE: CPT

## 2017-11-24 PROCEDURE — 87077 CULTURE AEROBIC IDENTIFY: CPT

## 2017-11-24 PROCEDURE — 87086 URINE CULTURE/COLONY COUNT: CPT

## 2017-11-24 PROCEDURE — 99214 OFFICE O/P EST MOD 30 MIN: CPT | Mod: 25,S$GLB,, | Performed by: UROLOGY

## 2017-11-24 PROCEDURE — 87186 SC STD MICRODIL/AGAR DIL: CPT

## 2017-11-24 PROCEDURE — 99213 OFFICE O/P EST LOW 20 MIN: CPT | Mod: PBBFAC | Performed by: INTERNAL MEDICINE

## 2017-11-24 PROCEDURE — 81002 URINALYSIS NONAUTO W/O SCOPE: CPT | Mod: PBBFAC | Performed by: UROLOGY

## 2017-11-24 PROCEDURE — 99204 OFFICE O/P NEW MOD 45 MIN: CPT | Mod: S$GLB,,, | Performed by: INTERNAL MEDICINE

## 2017-11-24 PROCEDURE — 99213 OFFICE O/P EST LOW 20 MIN: CPT | Mod: PBBFAC,27 | Performed by: UROLOGY

## 2017-11-24 RX ORDER — LEVOTHYROXINE SODIUM 100 UG/1
100 TABLET ORAL DAILY
Qty: 30 TABLET | Refills: 11 | Status: SHIPPED | OUTPATIENT
Start: 2017-11-24 | End: 2018-02-20 | Stop reason: SDUPTHER

## 2017-11-24 NOTE — PROGRESS NOTES
CC: hematuria, recurrent urethral stricture    CC: recurrent enterococcus UTI, bladder diverticulum, urethral stricture    Alin Burkett is a 67 y.o. man who is here for the evaluation of Follow-up  s/p cysto and urethral dilation for anterior urethral stricture on 17.  Bladder diverticulum was found on cysto.  He is here to discuss the future management of his urinary conditions.    States that he can void freely.  He is pleased with the outcome of the surgery.  C/o bleeding after self urethral dilation.  Thus he has not done additional urethral dilation.    Denies flank pain, dysuira .      Past Medical History:   Diagnosis Date    Acidosis     Adrenal adenoma     Anemia associated with chronic renal failure     Arrhythmia, onset 2015    Awaiting organ transplant status 2013    Basal cell carcinoma 2012    left nasal tip    Blood type B+ 2013    Cancer     Celiac artery dissection     Chronic diarrhea     Chronic urethral stricture     Congenital absence of kidney     left    -donor kidney transplant 16     Induced w Campath 30 mg IV intraoperatively & SoluMedrol 875 mg total over 3 days.  Renal allograft biopsy 17 (DIVINE): 21 glomeruli, none globally sclerosed, <5% interstitial fibrosis, no ACR, c4d negative, AVR CCT Type 2 (V1 lesion); plan THYMO     Dissecting aortic aneurysm (any part), abdominal     Diverticulosis     Encounter for blood transfusion     ESRD (end stage renal disease) 2010    H/O: urethral stricture     History of AAA (abdominal aortic aneurysm) repair     Hypertension     Hypokalemia     Hypothyroidism 1/10/2014    Inguinal hernia bilateral, non-recurrent     Kidney stones     Organ transplant candidate 2013    Plantar warts 1/10/2014    Recurrent UTI 2017    S/P kidney transplant     Secondary hyperparathyroidism, renal     Thyroid disease      Past Surgical History:   Procedure Laterality Date     ABDOMINAL SURGERY      exploratory lapatomy x 2    AORTA - SUPERIOR MESENTERIC ARTERY BYPASS GRAFT      BLADDER NECK RECONSTRUCTION      BLADDER SURGERY      COLONOSCOPY  10/10/2013    Dr. Gutierrez, repeat in 5 years    CYSTOSCOPY      GASTROJEJUNOSTOMY      HEMORRHOID SURGERY      HERNIA REPAIR      KIDNEY TRANSPLANT      LITHOTRIPSY      PERCUTANEOUS NEPHROLITHOTRIPSY      right  ESWL  10/31/12    right ESWL  6/26/12     Social History   Substance Use Topics    Smoking status: Former Smoker     Years: 40.00     Types: Cigarettes     Quit date: 6/16/2010    Smokeless tobacco: Never Used    Alcohol use No      Comment: stopped ETOH in 2010     Family History   Problem Relation Age of Onset    Diabetes Mother     Cancer Brother      thyroid cancer    Stroke Maternal Aunt     Kidney disease Paternal Uncle     Kidney disease Cousin     Melanoma Neg Hx     Psoriasis Neg Hx     Lupus Neg Hx     Eczema Neg Hx     Colon cancer Neg Hx     Colon polyps Neg Hx     Crohn's disease Neg Hx     Ulcerative colitis Neg Hx     Celiac disease Neg Hx      Allergy:  Review of patient's allergies indicates:  No Known Allergies  Outpatient Encounter Prescriptions as of 11/24/2017   Medication Sig Dispense Refill    famotidine (PEPCID) 20 MG tablet Take 1 tablet (20 mg total) by mouth every evening. 30 tablet 11    k phos di & mono-sod phos mono (K-PHOS-NEUTRAL) 250 mg Tab Take 4 tablets by mouth 4 (four) times daily. (Patient taking differently: Take 1,000 mg by mouth 3 (three) times daily. ) 480 tablet 11    ketoconazole (NIZORAL) 200 mg Tab Take 0.5 tablets (100 mg total) by mouth once daily. 15 tablet 11    levothyroxine (SYNTHROID) 100 MCG tablet Take 1 tablet (100 mcg total) by mouth once daily. 30 tablet 11    magnesium oxide (MAG-OX) 400 mg tablet Take 1 tablet (400 mg total) by mouth once daily. 30 tablet 11    metoprolol tartrate (LOPRESSOR) 25 MG tablet Take 0.5 tablets (12.5 mg total) by  mouth 2 (two) times daily. 30 tablet 11    multivitamin (ONE DAILY MULTIVITAMIN) per tablet Take 1 tablet by mouth once daily.      mycophenolate (CELLCEPT) 250 mg Cap Take 3 capsules (750 mg total) by mouth 2 (two) times daily. Z94.0/Kidney Transplant on 11/26/16 180 capsule 11    predniSONE (DELTASONE) 5 MG tablet Take 1 tablet (5 mg total) by mouth once daily. Z94.0/Kidney transplant on 11/26/2016 30 tablet 11    sodium bicarbonate 650 MG tablet Take 3 tablets (1,950 mg total) by mouth 2 (two) times daily. 180 tablet 11    sulfamethoxazole-trimethoprim 400-80mg (BACTRIM,SEPTRA) 400-80 mg per tablet Take 1 tablet by mouth once daily. Stop: 11/26/17 30 tablet 11    tacrolimus (PROGRAF) 1 MG Cap Take 4 capsules (4 mg total) by mouth every 12 (twelve) hours. Z94.0/Kidney Transplant on 11/26/16 (Patient taking differently: Take by mouth every 12 (twelve) hours. 5 tablets am   4 tablets  pm) 240 capsule 11    [DISCONTINUED] levothyroxine (SYNTHROID) 112 MCG tablet Take 1 tablet (112 mcg total) by mouth once daily. 30 tablet 6    [DISCONTINUED] oxycodone-acetaminophen (PERCOCET) 5-325 mg per tablet Take 1 tablet by mouth every 4 (four) hours as needed for Pain. 18 tablet 0     No facility-administered encounter medications on file as of 11/24/2017.      Review of Systems   General ROS: GENERAL:  No weight gain or loss  SKIN:  No rashes or lacerations  HEAD:  No headaches  EYES:  No exophthalmos, jaundice or blindness  EARS:  No dizziness, tinnitus or hearing loss  NOSE:  No changes in smell  MOUTH & THROAT:  No dyskinetic movements or obvious goiter  CHEST:  No shortness of breath, hyperventilation or cough  CARDIOVASCULAR:  No tachycardia or chest pain  ABDOMEN:  No nausea, vomiting, pain, constipation or diarrhea  URINARY:  No frequency, dysuria or sexual dysfunction  ENDOCRINE:  No polydipsia, polyuria  MUSCULOSKELETAL:  No pain or stiffness of the joints  NEUROLOGIC:  No weakness, sensory changes, seizures,  confusion, memory loss, tremor or other abnormal movements  Physical Exam     Vitals:    11/24/17 1047   BP: 118/70   Pulse: 68     Physical Exam  Genitalia:  Scrotum: no rash or lesion  Normal symmetric epididymis without masses  Normal vas palpated  Normal size, symmetric testicles with no masses   Normal urethral meatus with no discharge  Normal circumcised penis with no lesion   Rectal:  Normal perineum and anus upon inspection.  Normal tone, no masses or tenderness;     LABS:  Lab Results   Component Value Date    PSA 0.41 10/18/2016    PSA 0.32 10/20/2015    PSA 0.45 11/04/2014    PSA 0.79 11/21/2013    PSA 0.53 01/04/2013    PSA 1.1 05/17/2011    PSA 0.4 04/23/2008     No results found for this or any previous visit.  Lab Results   Component Value Date    CREATININE 1.5 (H) 11/21/2017    CREATININE 1.6 (H) 11/07/2017    CREATININE 1.5 (H) 10/03/2017     Results for orders placed or performed in visit on 10/03/17   TESTOSTERONE   Result Value Ref Range    Testosterone, Total 543 195.0 - 1138.0 ng/dL   Results for orders placed or performed in visit on 05/15/12   Testosterone   Result Value Ref Range    Testosterone, Total 652 195 - 1138 ng/dl     Urine Culture, Routine   Date Value Ref Range Status   08/02/2017   Final    KLEBSIELLA PNEUMONIAE ESBL  10,000 - 49,999 cfu/ml         Assessment and Plan:  Alin was seen today for follow-up.    Diagnoses and all orders for this visit:    Urethral stricture, unspecified stricture type    S/P kidney transplant    Recurrent UTI  -     POCT urine dipstick without microscope  -     Urine culture    he was doing CIC 2 x day to improve his bladder emptying, minimize recurrent urethral stricture.  Will do cysto to re-evaluate his urethral stricture.  Urethral dilation poss. DVIU will be scheduled.  Urine culture today.    I spent 25 minutes with the patient of which more than half was spent in direct consultation with the patient in regards to our treatment and plan.       Follow-up:  Return for cysto urethral dilation, poss. DVIU.

## 2017-11-24 NOTE — PROGRESS NOTES
"Mr. Burkett is a 67 y.o. M with history of hypothyroidism, adrenal incidentalomas, ESRD s/p transplant 11/26/16, here for reestablishing care.    Pt is new to me.  Last visit in Jan 2014, since then he has received his transplant.     Regarding his hypothyroidism: s/p 33mCi  on 09/27/2012.  In the past thyroid Us with only small cystic lesions. Pt is currently on 112mcg for levothyroxine daily as chronic dose.  His most recent TSH was mildly suppressed (see below). He denies palpitations or tremors or recent weight changes.      Regarding his bilateraly adrenal incidentalomas: initially found as part of kidney transplant workup in 2011 - 1cm bilateral adrenal nodules. On CT in Oct 2012 showed "stable" imaging with hormonal evaluation with 2012 labs showing ON Dex suppression with cortiso of 3.3, low renin below lower threshold of detection and kathrine of 9 and 18 on separate occasions (unclear what medications pt was on), and plasma metanephrine minimally elevated (felt due to ESRD).  He had a recent CT in Oct 2016 noncontrast of the abdomen though no adrenal was mentioned.     On 5mg prednisone daily in addition to cellcept and prograf.  His kidney transplant was complicated by rejection.     Component      Latest Ref Rng & Units 10/3/2017             TSH      0.400 - 4.000 uIU/mL 0.298 (L)   Vit D, 25-Hydroxy      30 - 96 ng/mL    Renin Activity      ng/mL/h    Aldosterone      ng/dL    Free T4      0.71 - 1.51 ng/dL 1.08   PTH      9.0 - 77.0 pg/mL    T3, Free      2.3 - 4.2 pg/mL 2.0 (L)     Component      Latest Ref Rng & Units 9/18/2012 6/26/2012 6/19/2012 5/15/2012                Metanephrine, Free      see fn pg/mL 111 (H)  95 (H) 81 (H)   Normetanephrine, Free      see fn pg/mL 83  132 106   Metanephrine, Total, Plasma      see fn pg/mL 194  227 (H) 187     Component      Latest Ref Rng & Units 9/18/2012 6/26/2012 6/19/2012 5/15/2012                Renin Activity      ng/mL/h <0.6   <0.6   Aldosterone      " ng/dL 9.3   18.8     Past Medical History:   Diagnosis Date    Acidosis     Adrenal adenoma     Anemia associated with chronic renal failure     Arrhythmia, onset 2015    Awaiting organ transplant status 2013    Basal cell carcinoma 2012    left nasal tip    Blood type B+ 2013    Cancer     Celiac artery dissection     Chronic diarrhea     Chronic urethral stricture     Congenital absence of kidney     left    -donor kidney transplant 16     Induced w Campath 30 mg IV intraoperatively & SoluMedrol 875 mg total over 3 days.  Renal allograft biopsy 17 (DIVINE): 21 glomeruli, none globally sclerosed, <5% interstitial fibrosis, no ACR, c4d negative, AVR CCT Type 2 (V1 lesion); plan THYMO     Dissecting aortic aneurysm (any part), abdominal     Diverticulosis     Encounter for blood transfusion     ESRD (end stage renal disease) 2010    H/O: urethral stricture     History of AAA (abdominal aortic aneurysm) repair     Hypertension     Hypokalemia     Hypothyroidism 1/10/2014    Inguinal hernia bilateral, non-recurrent     Kidney stones     Organ transplant candidate 2013    Plantar warts 1/10/2014    Recurrent UTI 2017    S/P kidney transplant     Secondary hyperparathyroidism, renal     Thyroid disease         reports that he quit smoking about 7 years ago. His smoking use included Cigarettes. He quit after 40.00 years of use. He has never used smokeless tobacco. He reports that he does not drink alcohol or use drugs.    Family History   Problem Relation Age of Onset    Diabetes Mother     Cancer Brother      thyroid cancer    Stroke Maternal Aunt     Kidney disease Paternal Uncle     Kidney disease Cousin     Melanoma Neg Hx     Psoriasis Neg Hx     Lupus Neg Hx     Eczema Neg Hx     Colon cancer Neg Hx     Colon polyps Neg Hx     Crohn's disease Neg Hx     Ulcerative colitis Neg Hx     Celiac disease Neg Hx         Past Surgical History:   Procedure Laterality Date    ABDOMINAL SURGERY      exploratory lapatomy x 2    AORTA - SUPERIOR MESENTERIC ARTERY BYPASS GRAFT      BLADDER NECK RECONSTRUCTION      BLADDER SURGERY      COLONOSCOPY  10/10/2013    Dr. Gutierrez, repeat in 5 years    CYSTOSCOPY      GASTROJEJUNOSTOMY      HEMORRHOID SURGERY      HERNIA REPAIR      KIDNEY TRANSPLANT      LITHOTRIPSY      PERCUTANEOUS NEPHROLITHOTRIPSY      right  ESWL  10/31/12    right ESWL  6/26/12       Patient's Medications   New Prescriptions    No medications on file   Previous Medications    FAMOTIDINE (PEPCID) 20 MG TABLET    Take 1 tablet (20 mg total) by mouth every evening.    K PHOS DI & MONO-SOD PHOS MONO (K-PHOS-NEUTRAL) 250 MG TAB    Take 4 tablets by mouth 4 (four) times daily.    KETOCONAZOLE (NIZORAL) 200 MG TAB    Take 0.5 tablets (100 mg total) by mouth once daily.    LEVOTHYROXINE (SYNTHROID) 112 MCG TABLET    Take 1 tablet (112 mcg total) by mouth once daily.    MAGNESIUM OXIDE (MAG-OX) 400 MG TABLET    Take 1 tablet (400 mg total) by mouth once daily.    METOPROLOL TARTRATE (LOPRESSOR) 25 MG TABLET    Take 0.5 tablets (12.5 mg total) by mouth 2 (two) times daily.    MULTIVITAMIN (ONE DAILY MULTIVITAMIN) PER TABLET    Take 1 tablet by mouth once daily.    MYCOPHENOLATE (CELLCEPT) 250 MG CAP    Take 3 capsules (750 mg total) by mouth 2 (two) times daily. Z94.0/Kidney Transplant on 11/26/16    OXYCODONE-ACETAMINOPHEN (PERCOCET) 5-325 MG PER TABLET    Take 1 tablet by mouth every 4 (four) hours as needed for Pain.    PREDNISONE (DELTASONE) 5 MG TABLET    Take 1 tablet (5 mg total) by mouth once daily. Z94.0/Kidney transplant on 11/26/2016    SODIUM BICARBONATE 650 MG TABLET    Take 3 tablets (1,950 mg total) by mouth 2 (two) times daily.    SULFAMETHOXAZOLE-TRIMETHOPRIM 400-80MG (BACTRIM,SEPTRA) 400-80 MG PER TABLET    Take 1 tablet by mouth once daily. Stop: 11/26/17    TACROLIMUS (PROGRAF) 1 MG CAP     "Take 4 capsules (4 mg total) by mouth every 12 (twelve) hours. Z94.0/Kidney Transplant on 11/26/16   Modified Medications    No medications on file   Discontinued Medications    No medications on file         Review of Systems:  General: no fevers  Eyes: no vision changes  ENT: no sore throat  Lung: no sob  CV: no palpitations  GI: no nausea   : no dysuria   Endo: no heat interolance  Heme: no easy bruising   MSK: no joint swelling        /70 (BP Location: Right arm, Patient Position: Sitting, BP Method: Large (Manual))   Pulse 68   Ht 5' 11" (1.803 m)   Wt 65.3 kg (143 lb 15.4 oz)   BMI 20.08 kg/m²     Physical Exam:  General: thin body habitus, not in acute distress   Eyes: anicteric, no proptosis   ENT: no facial lesions, no oral lesions   Neck: no masses, no LAD, thyroid 10g  Lung; ctabl, no wheezing or stridor   GI: normal bowel sounds, nondistended, healed scars on abdomen   CV: RRR, no rubs or murmurs   Skin: exposed skin without bruising or bleeding   Ext: no peripheral edema or erythema  Neuro: AOx3, moving all extremities, normal gait     Labs:    Chemistry        Component Value Date/Time     11/21/2017 0713    K 3.8 11/21/2017 0713     11/21/2017 0713    CO2 25 11/21/2017 0713    BUN 17 11/21/2017 0713    CREATININE 1.5 (H) 11/21/2017 0713    GLU 82 11/21/2017 0713        Component Value Date/Time    CALCIUM 9.2 11/21/2017 0713    ALKPHOS 64 11/07/2017 0717    AST 25 11/07/2017 0717    ALT 28 11/07/2017 0717    BILITOT 0.6 11/07/2017 0717    ESTGFRAFRICA 55.3 (A) 11/21/2017 0713    EGFRNONAA 47.8 (A) 11/21/2017 0713          Lab Results   Component Value Date    WBC 4.24 11/21/2017    HGB 12.0 (L) 11/21/2017    HCT 37.5 (L) 11/21/2017    MCV 96 11/21/2017     (L) 11/21/2017        Lab Results   Component Value Date    HDL 58 02/20/2017    HDL 45 11/26/2016    HDL 39 (L) 06/21/2016     Lab Results   Component Value Date    LDLCALC 71.8 02/20/2017    LDLCALC 52.2 (L) " 11/26/2016    LDLCALC 45.6 (L) 06/21/2016     Lab Results   Component Value Date    TRIG 131 02/20/2017    TRIG 49 11/26/2016    TRIG 62 06/21/2016     Lab Results   Component Value Date    CHOLHDL 37.2 02/20/2017    CHOLHDL 42.1 11/26/2016    CHOLHDL 40.2 06/21/2016       Hemoglobin A1C   Date Value Ref Range Status   02/08/2012 5.2 4.0 - 6.2 % Final       Lab Results   Component Value Date    TSH 0.298 (L) 10/03/2017         Assessment and Plan:  Mr. Burkett with history of hypothyroidism, adrenal incidentalomas, ESRD s/p transplant 11/26/16, here for reestablishing care.    1. Adrenal nodules:   Will review w radiology if CT's done in 2016 is able to offer information on stability of adrenal nodules  Repeat hormonal workup - plasma metanephrines, renin/kathrine (noted pt on prednisone thus unclear usefulness of hypercortisolism testing especially without signs/symptoms)  If continues to be stable, can likely stop repeat monitoring    2. Hypothyroidism: mildly suppressed TSH - can decrease to 100mcg levothyroxine daily - repeat TFTs in 2 months    3. Osteoporosis screening: in setting of age and chronic steroid use    RTC 2 months    Marcin Pineda M.D.  Endocrinology

## 2017-11-27 ENCOUNTER — HOSPITAL ENCOUNTER (OUTPATIENT)
Dept: RADIOLOGY | Facility: CLINIC | Age: 67
Discharge: HOME | End: 2017-11-27
Attending: INTERNAL MEDICINE
Payer: MEDICARE

## 2017-11-27 ENCOUNTER — TELEPHONE (OUTPATIENT)
Dept: UROLOGY | Facility: CLINIC | Age: 67
End: 2017-11-27

## 2017-11-27 ENCOUNTER — PATIENT MESSAGE (OUTPATIENT)
Dept: ENDOCRINOLOGY | Facility: CLINIC | Age: 67
End: 2017-11-27

## 2017-11-27 DIAGNOSIS — M89.9 DISORDER OF BONE: ICD-10-CM

## 2017-11-27 DIAGNOSIS — N30.00 ACUTE CYSTITIS WITHOUT HEMATURIA: Primary | ICD-10-CM

## 2017-11-27 DIAGNOSIS — Z13.820 SCREENING FOR OSTEOPOROSIS: ICD-10-CM

## 2017-11-27 DIAGNOSIS — N39.0 RECURRENT UTI: Primary | ICD-10-CM

## 2017-11-27 DIAGNOSIS — N35.9 URETHRAL STRICTURE, UNSPECIFIED STRICTURE TYPE: ICD-10-CM

## 2017-11-27 LAB — BACTERIA UR CULT: NORMAL

## 2017-11-27 PROCEDURE — 77080 DXA BONE DENSITY AXIAL: CPT | Mod: 26,,, | Performed by: RADIOLOGY

## 2017-11-27 PROCEDURE — 77080 DXA BONE DENSITY AXIAL: CPT | Mod: TC,PO

## 2017-11-27 RX ORDER — NITROFURANTOIN 25; 75 MG/1; MG/1
100 CAPSULE ORAL 2 TIMES DAILY
Qty: 14 CAPSULE | Refills: 0 | Status: SHIPPED | OUTPATIENT
Start: 2017-11-27 | End: 2017-12-04

## 2017-11-27 NOTE — TELEPHONE ENCOUNTER
Diagnoses and all orders for this visit:    Recurrent UTI  -     Case Request Operating Room: CYSTOSCOPY, DILATION-URETHRA, URETHROTOMY-DIRECT VISUAL INTERNAL (DVIU)    Urethral stricture, unspecified stricture type  -     Case Request Operating Room: CYSTOSCOPY, DILATION-URETHRA, URETHROTOMY-DIRECT VISUAL INTERNAL (DVIU)

## 2017-11-27 NOTE — TELEPHONE ENCOUNTER
Diagnoses and all orders for this visit:    Acute cystitis without hematuria  -     nitrofurantoin, macrocrystal-monohydrate, (MACROBID) 100 MG capsule; Take 1 capsule (100 mg total) by mouth 2 (two) times daily.    eRxed to the pharmacy.  Please call and advise the patient to take the medication as given.

## 2017-11-27 NOTE — TELEPHONE ENCOUNTER
Hi Mr. Burkett - your bone density shows there is some osteopenia (lower bone density but not actual osteoporosis) at the hip.  We can discuss further at the next visit.    Best,  Dr. Pineda

## 2017-12-05 ENCOUNTER — DOCUMENTATION ONLY (OUTPATIENT)
Dept: ENDOCRINOLOGY | Facility: CLINIC | Age: 67
End: 2017-12-05

## 2017-12-05 ENCOUNTER — LAB VISIT (OUTPATIENT)
Dept: LAB | Facility: HOSPITAL | Age: 67
End: 2017-12-05
Attending: INTERNAL MEDICINE
Payer: MEDICARE

## 2017-12-05 DIAGNOSIS — Z94.0 KIDNEY REPLACED BY TRANSPLANT: ICD-10-CM

## 2017-12-05 LAB
ALBUMIN SERPL BCP-MCNC: 3.6 G/DL
ANION GAP SERPL CALC-SCNC: 10 MMOL/L
BASOPHILS # BLD AUTO: 0.02 K/UL
BASOPHILS NFR BLD: 0.5 %
BUN SERPL-MCNC: 20 MG/DL
CALCIUM SERPL-MCNC: 8.8 MG/DL
CHLORIDE SERPL-SCNC: 109 MMOL/L
CO2 SERPL-SCNC: 23 MMOL/L
CREAT SERPL-MCNC: 1.6 MG/DL
DIFFERENTIAL METHOD: ABNORMAL
EOSINOPHIL # BLD AUTO: 0.1 K/UL
EOSINOPHIL NFR BLD: 2 %
ERYTHROCYTE [DISTWIDTH] IN BLOOD BY AUTOMATED COUNT: 13.7 %
EST. GFR  (AFRICAN AMERICAN): 51.1 ML/MIN/1.73 M^2
EST. GFR  (NON AFRICAN AMERICAN): 44.2 ML/MIN/1.73 M^2
GLUCOSE SERPL-MCNC: 76 MG/DL
HCT VFR BLD AUTO: 38.4 %
HGB BLD-MCNC: 12.4 G/DL
IMM GRANULOCYTES # BLD AUTO: 0.02 K/UL
IMM GRANULOCYTES NFR BLD AUTO: 0.5 %
LYMPHOCYTES # BLD AUTO: 0.5 K/UL
LYMPHOCYTES NFR BLD: 13.3 %
MAGNESIUM SERPL-MCNC: 1.8 MG/DL
MCH RBC QN AUTO: 31.1 PG
MCHC RBC AUTO-ENTMCNC: 32.3 G/DL
MCV RBC AUTO: 96 FL
MONOCYTES # BLD AUTO: 0.4 K/UL
MONOCYTES NFR BLD: 9.5 %
NEUTROPHILS # BLD AUTO: 3 K/UL
NEUTROPHILS NFR BLD: 74.2 %
NRBC BLD-RTO: 0 /100 WBC
PHOSPHATE SERPL-MCNC: 3.3 MG/DL
PLATELET # BLD AUTO: 119 K/UL
PMV BLD AUTO: 12 FL
POTASSIUM SERPL-SCNC: 3.6 MMOL/L
RBC # BLD AUTO: 3.99 M/UL
SODIUM SERPL-SCNC: 142 MMOL/L
WBC # BLD AUTO: 3.99 K/UL

## 2017-12-05 PROCEDURE — 80197 ASSAY OF TACROLIMUS: CPT

## 2017-12-05 PROCEDURE — 80069 RENAL FUNCTION PANEL: CPT

## 2017-12-05 PROCEDURE — 85025 COMPLETE CBC W/AUTO DIFF WBC: CPT

## 2017-12-05 PROCEDURE — 36415 COLL VENOUS BLD VENIPUNCTURE: CPT | Mod: PO

## 2017-12-05 PROCEDURE — 83735 ASSAY OF MAGNESIUM: CPT

## 2017-12-05 NOTE — PROGRESS NOTES
Reviewed w Dr. Garrett in radiology - Oct 2016 2017 abdominal CT reviewed - R and L adrenal nodules stable in size about 1cm each.  Study limited by lack of contrast.     Marcin Pineda MD

## 2017-12-06 LAB
CMV DNA SERPL NAA+PROBE-ACNC: NORMAL IU/ML
TACROLIMUS BLD-MCNC: 8.4 NG/ML

## 2017-12-07 ENCOUNTER — TELEPHONE (OUTPATIENT)
Dept: TRANSPLANT | Facility: CLINIC | Age: 67
End: 2017-12-07

## 2017-12-07 ENCOUNTER — PATIENT MESSAGE (OUTPATIENT)
Dept: TRANSPLANT | Facility: CLINIC | Age: 67
End: 2017-12-07

## 2017-12-07 NOTE — TELEPHONE ENCOUNTER
----- Message from Chasidy Mcclain MD sent at 12/6/2017  5:53 PM CST -----  Mildly higher than target level, please repeat tac in 2-3 weeks. Thank you!

## 2017-12-07 NOTE — TELEPHONE ENCOUNTER
Notified patient of Dr. Mcclain's review of 12/5/17 lab results. Instructed patient to repeat Prograf level on 12/27/17. Patient verbalized understanding.

## 2017-12-13 ENCOUNTER — ANESTHESIA EVENT (OUTPATIENT)
Dept: SURGERY | Facility: HOSPITAL | Age: 67
End: 2017-12-13
Payer: MEDICARE

## 2017-12-13 ENCOUNTER — TELEPHONE (OUTPATIENT)
Dept: TRANSPLANT | Facility: CLINIC | Age: 67
End: 2017-12-13

## 2017-12-13 DIAGNOSIS — N39.0 URINARY TRACT INFECTION WITHOUT HEMATURIA, SITE UNSPECIFIED: Primary | ICD-10-CM

## 2017-12-13 NOTE — ANESTHESIA PREPROCEDURE EVALUATION
Pre Admission Screening  Jess Freed RN      []Hide copied text  Anesthesia Assessment: Preoperative EQUATION     Planned Procedure: Procedure(s) (LRB):  CYSTOSCOPY (N/A)  DILATION-URETHRA (N/A)  URETHROTOMY-DIRECT VISUAL INTERNAL (DVIU) (N/A)  Requested Anesthesia Type:General  Surgeon: Dewey Mann MD  Service: Urology  Known or anticipated Date of Surgery:12/27/2017     Surgeon notes: reviewed     Electronic QUestionnaire Assessment completed via nurse interview with patient.         NO AQ     Triage considerations:      The patient has no apparent active cardiac condition (No unstable coronary Syndrome such as severe unstable angina or recent [<1 month] myocardial infarction, decompensated CHF, severe valvular   disease or significant arrhythmia)     Previous anesthesia records:GETA, LMA General, MAC and No problems -HX of TMJ  Cystoscopy:  07/19/17; Placement Time: 1511; Inserted by: CRNA; Airway Device: LMA; Mask Ventilation: Easy - oral; Intubated: Postinduction; Cuff Inflation: Minimal occlusive pressure; Placement Verified By: Auscultation, Capnometry; Complicating Factors: None; Intubation Findings: Positive EtCO2, Bilateral breath sounds, Atraumatic/Condition of teeth unchanged; Securment: Lips; Complications: None; Removal Date: 07/19/17;      11/26/16; Placement Time: 1439; Method of Intubation: Direct laryngoscopy; Inserted by: CRNA; Airway Device: Endotracheal Tube; Mask Ventilation: Easy; Intubated: Postinduction; Blade: Dotson #2; Airway Device Size: 8.0; Style: Cuffed; Cuff Inflation: Minimal occlusive pressure; Inflation Amount: 6; Placement Verified By: Auscultation, Capnometry; Grade: Grade I; Complicating Factors: None; Intubation Findings: Positive EtCO2, Bilateral breath sounds, Atraumatic/Condition of teeth unchanged;  Depth of Insertion: 26; Securment: Lips; Complications: None; Breath Sounds: Equal Bilateral; Insertion Attempts: 1;      Last PCP note: within 3 months [facility]  11/20  Subspecialty notes: Endocrinology, Gastroenterology, Kidney Transplant     Other important co-morbidities: S/P Kidney transplant d/t HTN 11/2016, HTN, GERD, HX anemia, Hyperparathyroidism, hypothyroidism     Tests already available:  Available tests,  within 1 month . 12/5/17 renal function, CBC and tacro level. 11/2016 EKG and 9/2016 echo.                            Instructions given. (See in Nurse's note)     Optimization:  Anesthesia Preop Clinic Assessment  Indicated-not required for this procedure    Medical Opinion Indicated-will notify transplant of surgery                                        Plan:    Testing:  none                           Patient  has previously scheduled Medical Appointment:12/22 tacro level per Transplant     Navigation: Tests Scheduled.                         Consults scheduled.                        Results will be tracked by Preop Clinic.                                Electronically signed by Jess Freed RN at 12/13/2017 10:33 AM        Pre-admit on 12/27/2017            Detailed Report      12/26-tac level and UA results noted, and transplant aware of pt surgery.                                                                                                               12/13/2017  Alin Burkett is a 67 y.o., male.    Anesthesia Evaluation         Review of Systems  Anesthesia Hx:  No problems with previous Anesthesia History of prior surgery of interest to airway management or planning: Previous anesthesia: General 7/19/17 cysto DVIU with general anesthesia.  Procedure performed at an [facility]. Airway issues documented on chart review include laryngeal mask airway used  Denies Family Hx of Anesthesia complications.   Denies Personal Hx of Anesthesia complications.   Social:  Former Smoker    Hematology/Oncology:     Oncology Normal    -- Anemia: Hematology Comments: HX anemia- H/H 38.4 & 12.4 on 12/5/17    EENT/Dental:   Jaw Problems:  Clicking (TMJ) and  Pain   Cardiovascular:   Hypertension  Denies Angina. History of AAA (abdominal aortic aneurysm) repair Functional Capacity 4 METS  Abdominal Aortic Aneurysm, s/p surgical repair  Hypertension , Well Controlled on Rx    Pulmonary:   Denies Shortness of breath.  Denies Recent URI. Sleep Apnea    Renal/:   Chronic Renal Disease HX adrenal incidentaloma  S/P Kidney transplant  Congenital absence of kidney Kidney Function/Disease, Chronic Kidney Disease (CKD) , CKD Stage III (GFR 30-59) S/P Kidney Transplant, functioning, stable. Other Renal / Gu Conditions: (recurrent UTI, urethral stricture)   Hepatic/GI:  Esophageal / Stomach Disorders Gerd Controlled by chronic antireflux medication.    Musculoskeletal:  Musculoskeletal Normal    Neurological:  Neurology Normal    Endocrine:   Hypothyroidism Secondary hyperparathyroidism, renal Thyroid Disease Hypothyroidism  Parathyroid Disease, Hyperparathyroidism    Psych:  Psychiatric Normal           Physical Exam  General:  Well nourished    Airway/Jaw/Neck:  Airway Findings: Mouth Opening: Normal Tongue: Normal  General Airway Assessment: Adult  Mallampati: II  TM Distance: Normal, at least 6 cm        Eyes/Ears/Nose:  EYES/EARS/NOSE FINDINGS: Normal   Dental:  Dental Findings: In tact    Chest/Lungs:  Chest/Lungs Clear    Heart/Vascular:  Heart Findings: Normal Heart murmur: negative Vascular Findings: Normal    Abdomen:  Abdomen Findings: Normal    Musculoskeletal:  Musculoskeletal Findings: Normal   Skin:  Skin Findings: Normal    Mental Status:  Mental Status Findings: Normal        Anesthesia Plan  Type of Anesthesia, risks & benefits discussed:  Anesthesia Type:  general, MAC  Patient's Preference:   Intra-op Monitoring Plan:   Intra-op Monitoring Plan Comments:   Post Op Pain Control Plan:   Post Op Pain Control Plan Comments:   Induction:   IV  Beta Blocker:  Patient is on a Beta-Blocker and has received one dose within the past 24 hours (No further documentation  required).       Informed Consent: Patient understands risks and agrees with Anesthesia plan.  Questions answered. Anesthesia consent signed with patient.  ASA Score: 3     Day of Surgery Review of History & Physical:    H&P update referred to the surgeon.         Ready For Surgery From Anesthesia Perspective.

## 2017-12-13 NOTE — PRE ADMISSION SCREENING
Anesthesia Assessment: Preoperative EQUATION    Planned Procedure: Procedure(s) (LRB):  CYSTOSCOPY (N/A)  DILATION-URETHRA (N/A)  URETHROTOMY-DIRECT VISUAL INTERNAL (DVIU) (N/A)  Requested Anesthesia Type:General  Surgeon: Dewey Mann MD  Service: Urology  Known or anticipated Date of Surgery:12/27/2017    Surgeon notes: reviewed    Electronic QUestionnaire Assessment completed via nurse interview with patient.        NO AQ    Triage considerations:     The patient has no apparent active cardiac condition (No unstable coronary Syndrome such as severe unstable angina or recent [<1 month] myocardial infarction, decompensated CHF, severe valvular   disease or significant arrhythmia)    Previous anesthesia records:GETA, LMA General, MAC and No problems -HX of TMJ  Cystoscopy:  07/19/17; Placement Time: 1511; Inserted by: CRNA; Airway Device: LMA; Mask Ventilation: Easy - oral; Intubated: Postinduction; Cuff Inflation: Minimal occlusive pressure; Placement Verified By: Auscultation, Capnometry; Complicating Factors: None; Intubation Findings: Positive EtCO2, Bilateral breath sounds, Atraumatic/Condition of teeth unchanged; Securment: Lips; Complications: None; Removal Date: 07/19/17;     11/26/16; Placement Time: 1439; Method of Intubation: Direct laryngoscopy; Inserted by: CRNA; Airway Device: Endotracheal Tube; Mask Ventilation: Easy; Intubated: Postinduction; Blade: Dotson #2; Airway Device Size: 8.0; Style: Cuffed; Cuff Inflation: Minimal occlusive pressure; Inflation Amount: 6; Placement Verified By: Auscultation, Capnometry; Grade: Grade I; Complicating Factors: None; Intubation Findings: Positive EtCO2, Bilateral breath sounds, Atraumatic/Condition of teeth unchanged;  Depth of Insertion: 26; Securment: Lips; Complications: None; Breath Sounds: Equal Bilateral; Insertion Attempts: 1;     Last PCP note: within 3 months [facility] 11/20  Subspecialty notes: Endocrinology, Gastroenterology, Kidney  Transplant    Other important co-morbidities: S/P Kidney transplant d/t HTN 11/2016, HTN, GERD, HX anemia, Hyperparathyroidism, hypothyroidism     Tests already available:  Available tests,  within 1 month . 12/5/17 renal function, CBC and tacro level. 11/2016 EKG and 9/2016 echo.            Instructions given. (See in Nurse's note)    Optimization:  Anesthesia Preop Clinic Assessment  Indicated-not required for this procedure    Medical Opinion Indicated-will ask transplant if ok for surgery         Plan:    Testing:  none     Patient  has previously scheduled Medical Appointment:12/22 tacro level per Transplant    Navigation: Tests Scheduled.              Consults scheduled.             Results will be tracked by Preop Clinic.

## 2017-12-13 NOTE — TELEPHONE ENCOUNTER
----- Message from Jess Freed RN sent at 12/13/2017 10:48 AM CST -----  Pt having another cystoscopy/DVIU with Dr Mann on 12/27/17. Anesthesia review in progress and wanted to be sure he is ok for surgery.   I see he has a tacro level scheduled for 12/22, he requests to give a urine specimen at that time to be sure he doesn't have a UTI prior to surgery. Not sure if that's something you can order for him.  Please advise.  Thanks,  Jess Freed  Preop RN

## 2017-12-15 RX ORDER — LANOLIN ALCOHOL/MO/W.PET/CERES
400 CREAM (GRAM) TOPICAL DAILY
Qty: 30 TABLET | Refills: 11 | Status: SHIPPED | OUTPATIENT
Start: 2017-12-15 | End: 2017-12-26 | Stop reason: SDUPTHER

## 2017-12-19 ENCOUNTER — TELEPHONE (OUTPATIENT)
Dept: TRANSPLANT | Facility: CLINIC | Age: 67
End: 2017-12-19

## 2017-12-19 NOTE — TELEPHONE ENCOUNTER
SARBJIT received a message from pt's coordinator, BIANKA Olea, RN who reports that pt is trying to sign up for a new insurance ([employer]) and needs a letter stating that pt had a successful kidney transplant. SARBJIT spoke with pt to see where to send letter. Pt reports that he was not at home but requested it to be sent to his e-mail address [email or web address]. SARBJIT drafted the letter and provided a copy to pt's coordinator to have signed by the doctor. SARBJIT remains available to pt, pt's family, and transplant team at [phone].

## 2017-12-22 ENCOUNTER — TELEPHONE (OUTPATIENT)
Dept: UROLOGY | Facility: CLINIC | Age: 67
End: 2017-12-22

## 2017-12-22 ENCOUNTER — LAB VISIT (OUTPATIENT)
Dept: LAB | Facility: HOSPITAL | Age: 67
End: 2017-12-22
Attending: INTERNAL MEDICINE
Payer: MEDICARE

## 2017-12-22 DIAGNOSIS — N39.0 RECURRENT UTI: Primary | ICD-10-CM

## 2017-12-22 DIAGNOSIS — Z94.0 KIDNEY REPLACED BY TRANSPLANT: ICD-10-CM

## 2017-12-22 PROCEDURE — 36415 COLL VENOUS BLD VENIPUNCTURE: CPT | Mod: PO

## 2017-12-22 PROCEDURE — 80197 ASSAY OF TACROLIMUS: CPT

## 2017-12-22 RX ORDER — NITROFURANTOIN 25; 75 MG/1; MG/1
100 CAPSULE ORAL 2 TIMES DAILY
Qty: 14 CAPSULE | Refills: 0 | Status: SHIPPED | OUTPATIENT
Start: 2017-12-22 | End: 2017-12-29

## 2017-12-22 NOTE — TELEPHONE ENCOUNTER
Called pt and told him his antibiotic has been called into the pharmacy. Pt states he will pick it up and start it today.

## 2017-12-22 NOTE — TELEPHONE ENCOUNTER
Called pt to confirm 8:45am arrival time for procedure scheduled on 12/27. Gave pt NPO instructions and gave pt opportunity to ask questions. Pt verbalized understanding.

## 2017-12-22 NOTE — TELEPHONE ENCOUNTER
Diagnoses and all orders for this visit:    Recurrent UTI    Other orders  -     nitrofurantoin, macrocrystal-monohydrate, (MACROBID) 100 MG capsule; Take 1 capsule (100 mg total) by mouth 2 (two) times daily.    start prophylactic abx now for his surgery next week.  eRxed to the pharmacy.  Please call and advise the patient to take the medication as given.

## 2017-12-23 LAB — TACROLIMUS BLD-MCNC: 10.1 NG/ML

## 2017-12-26 RX ORDER — LANOLIN ALCOHOL/MO/W.PET/CERES
400 CREAM (GRAM) TOPICAL DAILY
Qty: 30 TABLET | Refills: 11 | Status: SHIPPED | OUTPATIENT
Start: 2017-12-26 | End: 2018-02-20 | Stop reason: SDUPTHER

## 2017-12-26 RX ORDER — TACROLIMUS 1 MG/1
CAPSULE ORAL
Qty: 210 CAPSULE | Refills: 11 | Status: SHIPPED | OUTPATIENT
Start: 2017-12-26 | End: 2018-02-09 | Stop reason: SDUPTHER

## 2017-12-26 NOTE — PROGRESS NOTES
Please lower prograf to 4/3 repeat labs in 1 week    I see a urine culture resulted from 4 days ago. I assume he is symptomatic, need to make a decision today. Call ID and see if he can be seen today. The sensitivities of this culture are limited

## 2017-12-27 ENCOUNTER — SURGERY (OUTPATIENT)
Age: 67
End: 2017-12-27

## 2017-12-27 ENCOUNTER — ANESTHESIA (OUTPATIENT)
Dept: SURGERY | Facility: HOSPITAL | Age: 67
End: 2017-12-27
Payer: MEDICARE

## 2017-12-27 ENCOUNTER — HOSPITAL ENCOUNTER (OUTPATIENT)
Facility: HOSPITAL | Age: 67
Discharge: HOME | End: 2017-12-27
Attending: UROLOGY | Admitting: UROLOGY
Payer: MEDICARE

## 2017-12-27 VITALS
WEIGHT: 146 LBS | RESPIRATION RATE: 18 BRPM | BODY MASS INDEX: 20.44 KG/M2 | HEART RATE: 69 BPM | TEMPERATURE: 97 F | OXYGEN SATURATION: 99 % | SYSTOLIC BLOOD PRESSURE: 129 MMHG | HEIGHT: 71 IN | DIASTOLIC BLOOD PRESSURE: 69 MMHG

## 2017-12-27 DIAGNOSIS — N52.9 ED (ERECTILE DYSFUNCTION) OF ORGANIC ORIGIN: Primary | ICD-10-CM

## 2017-12-27 DIAGNOSIS — R31.9 HEMATURIA, UNSPECIFIED TYPE: ICD-10-CM

## 2017-12-27 DIAGNOSIS — N35.919 URETHRAL STRICTURE: ICD-10-CM

## 2017-12-27 PROCEDURE — 25000003 PHARM REV CODE 250: Performed by: UROLOGY

## 2017-12-27 PROCEDURE — 25000003 PHARM REV CODE 250: Performed by: STUDENT IN AN ORGANIZED HEALTH CARE EDUCATION/TRAINING PROGRAM

## 2017-12-27 PROCEDURE — 36000706: Performed by: UROLOGY

## 2017-12-27 PROCEDURE — C1769 GUIDE WIRE: HCPCS | Performed by: UROLOGY

## 2017-12-27 PROCEDURE — 71000015 HC POSTOP RECOV 1ST HR: Performed by: UROLOGY

## 2017-12-27 PROCEDURE — 74450 X-RAY URETHRA/BLADDER: CPT | Mod: 26,,, | Performed by: UROLOGY

## 2017-12-27 PROCEDURE — D9220A PRA ANESTHESIA: Mod: CRNA,,, | Performed by: NURSE ANESTHETIST, CERTIFIED REGISTERED

## 2017-12-27 PROCEDURE — 36000707: Performed by: UROLOGY

## 2017-12-27 PROCEDURE — D9220A PRA ANESTHESIA: Mod: ANES,,, | Performed by: ANESTHESIOLOGY

## 2017-12-27 PROCEDURE — 63600175 PHARM REV CODE 636 W HCPCS: Performed by: STUDENT IN AN ORGANIZED HEALTH CARE EDUCATION/TRAINING PROGRAM

## 2017-12-27 PROCEDURE — 51610 INJECTION FOR BLADDER X-RAY: CPT | Mod: 51,,, | Performed by: UROLOGY

## 2017-12-27 PROCEDURE — 27201423 OPTIME MED/SURG SUP & DEVICES STERILE SUPPLY: Performed by: UROLOGY

## 2017-12-27 PROCEDURE — 37000009 HC ANESTHESIA EA ADD 15 MINS: Performed by: UROLOGY

## 2017-12-27 PROCEDURE — 63600175 PHARM REV CODE 636 W HCPCS: Performed by: NURSE ANESTHETIST, CERTIFIED REGISTERED

## 2017-12-27 PROCEDURE — 76000 FLUOROSCOPY <1 HR PHYS/QHP: CPT | Mod: 26,59,, | Performed by: UROLOGY

## 2017-12-27 PROCEDURE — 71000033 HC RECOVERY, INTIAL HOUR: Performed by: UROLOGY

## 2017-12-27 PROCEDURE — 37000008 HC ANESTHESIA 1ST 15 MINUTES: Performed by: UROLOGY

## 2017-12-27 PROCEDURE — 52276 CYSTOSCOPY AND TREATMENT: CPT | Mod: ,,, | Performed by: UROLOGY

## 2017-12-27 PROCEDURE — 25000003 PHARM REV CODE 250: Performed by: NURSE ANESTHETIST, CERTIFIED REGISTERED

## 2017-12-27 RX ORDER — PROPOFOL 10 MG/ML
VIAL (ML) INTRAVENOUS
Status: DISCONTINUED | OUTPATIENT
Start: 2017-12-27 | End: 2017-12-27

## 2017-12-27 RX ORDER — LIDOCAINE HCL/PF 100 MG/5ML
SYRINGE (ML) INTRAVENOUS
Status: DISCONTINUED | OUTPATIENT
Start: 2017-12-27 | End: 2017-12-27

## 2017-12-27 RX ORDER — SILDENAFIL 100 MG/1
100 TABLET, FILM COATED ORAL DAILY PRN
Qty: 6 TABLET | Refills: 12 | Status: SHIPPED | OUTPATIENT
Start: 2017-12-27 | End: 2018-04-16

## 2017-12-27 RX ORDER — HYDROCODONE BITARTRATE AND ACETAMINOPHEN 5; 325 MG/1; MG/1
1 TABLET ORAL EVERY 6 HOURS PRN
Qty: 20 TABLET | Refills: 0 | Status: SHIPPED | OUTPATIENT
Start: 2017-12-27 | End: 2018-01-06

## 2017-12-27 RX ORDER — HYDROCODONE BITARTRATE AND ACETAMINOPHEN 5; 325 MG/1; MG/1
1 TABLET ORAL EVERY 6 HOURS PRN
Status: DISCONTINUED | OUTPATIENT
Start: 2017-12-27 | End: 2017-12-27 | Stop reason: HOSPADM

## 2017-12-27 RX ORDER — SODIUM CHLORIDE 9 MG/ML
INJECTION, SOLUTION INTRAVENOUS CONTINUOUS PRN
Status: DISCONTINUED | OUTPATIENT
Start: 2017-12-27 | End: 2017-12-27

## 2017-12-27 RX ORDER — SODIUM CHLORIDE 9 MG/ML
INJECTION, SOLUTION INTRAVENOUS CONTINUOUS
Status: DISCONTINUED | OUTPATIENT
Start: 2017-12-27 | End: 2017-12-27 | Stop reason: HOSPADM

## 2017-12-27 RX ORDER — FENTANYL CITRATE 50 UG/ML
INJECTION, SOLUTION INTRAMUSCULAR; INTRAVENOUS
Status: DISCONTINUED | OUTPATIENT
Start: 2017-12-27 | End: 2017-12-27

## 2017-12-27 RX ORDER — MEPERIDINE HYDROCHLORIDE 50 MG/ML
12.5 INJECTION INTRAMUSCULAR; INTRAVENOUS; SUBCUTANEOUS ONCE AS NEEDED
Status: DISCONTINUED | OUTPATIENT
Start: 2017-12-27 | End: 2017-12-27 | Stop reason: HOSPADM

## 2017-12-27 RX ORDER — LINEZOLID 2 MG/ML
600 INJECTION, SOLUTION INTRAVENOUS
Status: COMPLETED | OUTPATIENT
Start: 2017-12-27 | End: 2017-12-27

## 2017-12-27 RX ORDER — HYDROCODONE BITARTRATE AND ACETAMINOPHEN 5; 325 MG/1; MG/1
TABLET ORAL
Status: DISCONTINUED
Start: 2017-12-27 | End: 2017-12-27 | Stop reason: HOSPADM

## 2017-12-27 RX ORDER — LIDOCAINE HYDROCHLORIDE 20 MG/ML
JELLY TOPICAL
Status: DISCONTINUED | OUTPATIENT
Start: 2017-12-27 | End: 2017-12-27 | Stop reason: HOSPADM

## 2017-12-27 RX ORDER — PROPOFOL 10 MG/ML
VIAL (ML) INTRAVENOUS CONTINUOUS PRN
Status: DISCONTINUED | OUTPATIENT
Start: 2017-12-27 | End: 2017-12-27

## 2017-12-27 RX ADMIN — HYDROCODONE BITARTRATE AND ACETAMINOPHEN 1 TABLET: 5; 325 TABLET ORAL at 10:12

## 2017-12-27 RX ADMIN — PROPOFOL 30 MG: 10 INJECTION, EMULSION INTRAVENOUS at 09:12

## 2017-12-27 RX ADMIN — SODIUM CHLORIDE: 0.9 INJECTION, SOLUTION INTRAVENOUS at 09:12

## 2017-12-27 RX ADMIN — FENTANYL CITRATE 50 MCG: 50 INJECTION, SOLUTION INTRAMUSCULAR; INTRAVENOUS at 09:12

## 2017-12-27 RX ADMIN — PROPOFOL 170 MG: 10 INJECTION, EMULSION INTRAVENOUS at 09:12

## 2017-12-27 RX ADMIN — PROPOFOL 125 MCG/KG/MIN: 10 INJECTION, EMULSION INTRAVENOUS at 09:12

## 2017-12-27 RX ADMIN — LIDOCAINE HYDROCHLORIDE 10 ML: 20 JELLY TOPICAL at 09:12

## 2017-12-27 RX ADMIN — ERTAPENEM SODIUM 1 G: 1 INJECTION, POWDER, LYOPHILIZED, FOR SOLUTION INTRAMUSCULAR; INTRAVENOUS at 09:12

## 2017-12-27 RX ADMIN — LINEZOLID 600 MG: 600 INJECTION, SOLUTION INTRAVENOUS at 09:12

## 2017-12-27 RX ADMIN — LIDOCAINE HYDROCHLORIDE 25 MG: 20 INJECTION, SOLUTION INTRAVENOUS at 09:12

## 2017-12-27 RX ADMIN — FENTANYL CITRATE 25 MCG: 50 INJECTION, SOLUTION INTRAMUSCULAR; INTRAVENOUS at 09:12

## 2017-12-27 RX ADMIN — LIDOCAINE HYDROCHLORIDE 75 MG: 20 INJECTION, SOLUTION INTRAVENOUS at 09:12

## 2017-12-27 NOTE — TRANSFER OF CARE
"Anesthesia Transfer of Care Note    Patient: Alin Burkett    Procedure(s) Performed: Procedure(s) (LRB):  CYSTOSCOPY (N/A)  URETHROTOMY-DIRECT VISUAL INTERNAL (DVIU) (N/A)  URETHROGRAM-RETROGRADE (N/A)    Patient location: PACU    Anesthesia Type: general    Transport from OR: Transported from OR on room air with adequate spontaneous ventilation    Post pain: adequate analgesia    Post assessment: no apparent anesthetic complications and tolerated procedure well    Post vital signs: stable    Level of consciousness: awake, alert and oriented    Nausea/Vomiting: no nausea/vomiting    Complications: none    Transfer of care protocol was followed      Last vitals:   Visit Vitals  BP (!) 121/92 (BP Location: Right arm, Patient Position: Lying)   Pulse 66   Temp 36.8 °C (98.3 °F) (Temporal)   Resp 17   Ht 5' 11" (1.803 m)   Wt 66.2 kg (146 lb)   SpO2 98%   BMI 20.36 kg/m²     "

## 2017-12-27 NOTE — PROGRESS NOTES
Called placed to pharmacy again, informed Arlette that neither ertapenem or linezolid has arrived to the tube station. Arlette states she will check on it and give me a call back. Will continue to monitor.

## 2017-12-27 NOTE — DISCHARGE INSTRUCTIONS
Please continue taking MACROBID for three more days.   Patient may take out fournier catheter on Saturday morning (12/30/17). Please give patient 10cc syringe to use to remove catheter.   Please do Clean Intermittent Catheterizations (CIC) twice daily once catheter removed.   Please use 16Fr catheters to perform CIC.         Catheter Care  PATIENT EDUCATION:  1. Wash hands before and after handling the catheter.  2. Wash around urinary opening daily, taking care to avoid pulling on the catheter during cleansing.  3. Drink 8-12 glasses of fluids daily; increase fluid intake if urine becomes dark and concentrated.  4. Wipe all connecting junctions with alcohol or betadine before changing from leg-bag drainage to  overnight bag drainage.  5. Keep the drainage bag at a lower level than the bladder; do not place the bag on your chair.  6. Avoid letting the bag lay on its side as urine may flow back into the drainage tube.  7. Usually the catheter is not changed except when blocked or a malfunction occurs.  **MALE**  If you are uncircumcised, pull skin back over glans (head) of penis. Cleanse from the catheter outward to  include entire glans. After cleansing return foreskin to its normal position. Continue with cleansing the rest  of your genitalia, without returning to this glans area.  BATHING: You may take a shower. Do not soak in a tub.

## 2017-12-27 NOTE — TELEPHONE ENCOUNTER
----- Message from Travis Zimmerman MD sent at 12/26/2017  8:31 AM CST -----  Please lower prograf to 4/3 repeat labs in 1 week    I see a urine culture resulted from 4 days ago. I assume he is symptomatic, need to make a decision today. Call ID and see if he can be seen today. The sensitivities of this culture are limited

## 2017-12-27 NOTE — H&P
Urology ([facility]) H&P  Staff: Dewey Mann MD    Is this patient in a research study?  No    CC: urethral stricture     HPI:  Alin Burkett is a 67 y.o. male with recurrent enterococcus UTI, bladder diverticulum, urethral stricture       He is s/p cysto and urethral dilation for stricture in the pendulous urethra on 17. Bladder diverticulum was found on cysto. He presents today for urethral dilation possible DVIU. UCx growing enterococcus, on macrobid.  No blood thinners.      Denies flank pain, dysuira.        ROS:  Neg except per HPI    Past Medical History:   Diagnosis Date    Acidosis     Adrenal adenoma     Anemia associated with chronic renal failure     Arrhythmia, onset 2015    Awaiting organ transplant status 2013    Basal cell carcinoma 2012    left nasal tip    Blood type B+ 2013    Cancer     Celiac artery dissection     Chronic diarrhea     Chronic urethral stricture     Congenital absence of kidney     left    -donor kidney transplant 16     Induced w Campath 30 mg IV intraoperatively & SoluMedrol 875 mg total over 3 days.  Renal allograft biopsy 17 (DIVINE): 21 glomeruli, none globally sclerosed, <5% interstitial fibrosis, no ACR, c4d negative, AVR CCT Type 2 (V1 lesion); plan THYMO     Dissecting aortic aneurysm (any part), abdominal     Diverticulosis     Encounter for blood transfusion     ESRD (end stage renal disease) 2010    H/O: urethral stricture     History of AAA (abdominal aortic aneurysm) repair     Hypertension     Hypokalemia     Hypothyroidism 1/10/2014    Inguinal hernia bilateral, non-recurrent     Kidney stones     Organ transplant candidate 2013    Plantar warts 1/10/2014    Recurrent UTI 2017    S/P kidney transplant     Secondary hyperparathyroidism, renal     Thyroid disease        Past Surgical History:   Procedure Laterality Date    ABDOMINAL SURGERY      exploratory lapatomy x 2     AORTA - SUPERIOR MESENTERIC ARTERY BYPASS GRAFT      BLADDER NECK RECONSTRUCTION      BLADDER SURGERY      COLONOSCOPY  10/10/2013    Dr. Gutierrez, repeat in 5 years    CYSTOSCOPY      GASTROJEJUNOSTOMY      HEMORRHOID SURGERY      HERNIA REPAIR      KIDNEY TRANSPLANT      LITHOTRIPSY      PERCUTANEOUS NEPHROLITHOTRIPSY      right  ESWL  10/31/12    right ESWL  6/26/12       Social History     Social History    Marital status: Significant Other     Spouse name: N/A    Number of children: N/A    Years of education: N/A     Occupational History     Disabled     Social History Main Topics    Smoking status: Former Smoker     Years: 40.00     Types: Cigarettes     Quit date: 6/16/2010    Smokeless tobacco: Never Used    Alcohol use No      Comment: stopped ETOH in 2010    Drug use: No      Comment: THC in youth    Sexual activity: Yes     Partners: Female     Birth control/ protection: None     Other Topics Concern    Not on file     Social History Narrative    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      Current Outpatient Prescriptions on File Prior to Encounter   Medication Sig Dispense Refill    famotidine (PEPCID) 20 MG tablet Take 1 tablet (20 mg total) by mouth every evening. 30 tablet 11    k phos di & mono-sod phos mono (K-PHOS-NEUTRAL) 250 mg Tab Take 4 tablets by mouth 4 (four) times daily. (Patient taking differently: Take  1,000 mg by mouth 3 (three) times daily. ) 480 tablet 11    levothyroxine (SYNTHROID) 100 MCG tablet Take 1 tablet (100 mcg total) by mouth once daily. 30 tablet 11    metoprolol tartrate (LOPRESSOR) 25 MG tablet Take 0.5 tablets (12.5 mg total) by mouth 2 (two) times daily. 30 tablet 11    multivitamin (ONE DAILY MULTIVITAMIN) per tablet Take 1 tablet by mouth once daily.      mycophenolate (CELLCEPT) 250 mg Cap Take 3 capsules (750 mg total) by mouth 2 (two) times daily. Z94.0/Kidney Transplant on 11/26/16 180 capsule 11    predniSONE (DELTASONE) 5 MG tablet Take 1 tablet (5 mg total) by mouth once daily. Z94.0/Kidney transplant on 11/26/2016 30 tablet 11    sodium bicarbonate 650 MG tablet Take 3 tablets (1,950 mg total) by mouth 2 (two) times daily. 180 tablet 11    ketoconazole (NIZORAL) 200 mg Tab Take 0.5 tablets (100 mg total) by mouth once daily. 15 tablet 11    sulfamethoxazole-trimethoprim 400-80mg (BACTRIM,SEPTRA) 400-80 mg per tablet Take 1 tablet by mouth once daily. Stop: 11/26/17 30 tablet 11       Anticoagulation:  No    Physical Exam:      AAOx4, NAD, WDWN  NC/AT, EOMI, PER, sclerae anicteric, speech normal, tongue midline  Nl effort  Regular rate  Soft, non-tender, non-distended      Labs:    Urine dipstick today - negative for all components       Lab Results   Component Value Date    WBC 3.99 12/05/2017    HGB 12.4 (L) 12/05/2017    HCT 38.4 (L) 12/05/2017    MCV 96 12/05/2017     (L) 12/05/2017       BMP  Lab Results   Component Value Date     12/05/2017    K 3.6 12/05/2017     12/05/2017    CO2 23 12/05/2017    BUN 20 12/05/2017    CREATININE 1.6 (H) 12/05/2017    CALCIUM 8.8 12/05/2017    ANIONGAP 10 12/05/2017    ESTGFRAFRICA 51.1 (A) 12/05/2017    EGFRNONAA 44.2 (A) 12/05/2017       Lab Results   Component Value Date    PSA 0.41 10/18/2016    PSA 0.32 10/20/2015    PSA 0.45 11/04/2014       Imaging:      Assessment: Alin Burkett is a 67 y.o. male with recurrent  enterococcus UTI, bladder diverticulum, urethral stricture      Plan:     1. To OR today for cystoscopy with urethral dilation, possible DVIU  2. Consents signed   3. I have explained the risk, benefits, and alternatives of the procedure in detail. The patient voices understanding and all questions have been answered. The patient agrees to proceed as planned.       John Mckeon MD

## 2017-12-27 NOTE — TELEPHONE ENCOUNTER
Patient returned coordinator's call.   Notified patient of Dr. Zimmerman review of 12/22/17 lab results. Instructed patient to decrease Prograf to 4mg in AM & 3mg in PM; repeat labs on 1/9/18. Patient verbalized understanding.     Informed patient that Dr. Zimmerman recommends he sees infectious disease for further evaluation, ASAP.   Patient denies symptoms of UTI. Patient reports on 12/22/17 Dr. Mann started him on Macrobid 100mg bid in preparation for cystoscopy.  Urine results were routed to Dr. JULIA Mann. Instructed patient to notify Dr. Mann or Transplant nephrologist or report to nearest ER if he develops fever, any changes in condition. Patient verbalized understanding.

## 2017-12-27 NOTE — OP NOTE
[facility]  Operative Note    Date: 2017    Pre-Op Diagnosis: penile urethral stricture    Patient Active Problem List   Diagnosis    Adrenal adenoma    Calcium nephrolithiasis    Ectopic kidney    Essential hypertension    Congenital absence of kidney    Anemia of chronic disease    Basal cell carcinoma - of the nose    History of AAA (abdominal aortic aneurysm) repair,     Secondary hyperparathyroidism, renal    Metabolic acidosis    Hypokalemia    Chronic diarrhea    Hypothyroidism    Plantar warts    Left lateral abdominal pain    Arrhythmia, onset     History of smoking 10-25 pack years, quit , 20 pack-years    History of alcohol abuse, quit     Abnormal ECG    VPC (ventricular premature complex)    Body mass index (BMI) of 20.0-20.9 in adult    LOUISA (obstructive sleep apnea), CPAP refused    LVH (left ventricular hypertrophy) due to hypertensive disease    Chest pain    Elevated troponin I level    BPH (benign prostatic hyperplasia)    Prophylactic immunotherapy (transplant immunosuppression)    Long-term use of immunosuppressant medication    DIVINE (acute kidney injury)    -donor kidney transplant 16    Acute vascular rejection of kidney transplant 17    S/P kidney transplant    Hypophosphatemia    Complication of kidney transplant    Stage 3 chronic kidney disease    Urethral stricture    Recurrent UTI    Acquired bladder diverticulum       Post-Op Diagnosis: same    Procedure(s) Performed:   1.  Cystoscopy with DVIU  2.  RUG  3.  Fluoro < 1 hour    Specimen(s): none    Staff Surgeon: Dr. Dewey Mann MD    Assistant Surgeon: Siddhartha Barr MD    Anesthesia: Monitored Local Anesthesia with Sedation    Indications: Alin Burkett is a 67 y.o. male with urethral stricture presenting for endoscopic management.      Findings:   - Tight stricture in anterior (mid-penile) urethra.  - The stricture appeared to be roughly 3  cm in length beginning just distal to the external sphincter and extending proximally.    Estimated Blood Loss: min    Drains: 22 Fr fournier catheter    Procedure in Detail:  After risks, benefits and possible complications of cystoscopy were explained, the patient elected to undergo the procedure and informed consent was obtained. All questions were answered in the ingrid-operative area. The patient was transferred to the cystoscopy suite and placed in the supine position.  SCDs were applied and working.  Anesthesia was administered.  Once the patient was adequately sedated, he was placed in the dorsal lithotomy position and prepped and draped in the usual sterile fashion. Time out was performed, ingrid-procedural antibiotics were confirmed.     The cystoscope in a 22 Fr sheath was introduced into the patients urethra. A tight mid penile urethral stricture was noted, roughly 7 Fr in diameter. We could not pass the scope and the scope was removed.     A 60 mL catheter tip syringe was introduced into the patient's meatus.  A RUG was performed which revealed a narrowed area in the mid penile urethra with no additional lesions seen throughout the more proximal urethra.     The cystoscope was again placed into the urethra and a guide wire was placed through the strictured area and into the bladder. This was confirmed with fluoroscopy. We removed the cystoscope, keeping the wire in place. A urethrotome in a 22 Fr sheath was introduced into the urethra. The urethrotome was used to cut the strictured area at the 12 o'clock position until bleeding was visualized.      The scope was then passed through the rest of the urethra and into the bladder. The right and left ureteral orifices were identified in the normal anatomic position and were seen effluxing clear urine. Formal cystoscopy was performed which revealed a large left sided bladder diverticulum. No stones or masses were seen. The bladder neck was wide open as the patient  has had a previous TURP. The stricture appeared to be roughly 3 cm in length beginning just distal to the external sphincter and extending proximally. The scope was removed, keeping the wire in place.     The bladder was drained and a 22 Fr fournier catheter was placed over the wire. The wire was removed. The patient was removed from lithotomy.     The patient tolerated the procedure well and was transferred to recovery in stable condition.    Disposition:  The patient will follow up with Dr. Mann in 3 months.  The patient was given prescriptions for hydrocodone. He was instructed to take out his catheter at home in 3 days. He was also instructed to use a 16Fr catheter and perform CIC 2x/day. He will take his Macrobid for three more days.       Siddhartha Barr MD

## 2017-12-27 NOTE — PROGRESS NOTES
Diagnoses and all orders for this visit:    ED (erectile dysfunction) of organic origin  -     sildenafil (VIAGRA) 100 MG tablet; Take 1 tablet (100 mg total) by mouth daily as needed for Erectile Dysfunction.

## 2017-12-27 NOTE — PLAN OF CARE
Pt awake and alert.  Denies pain.  D/C instructions given, all questions answered.   Tolerated PO without difficulty.  Pt adequate for D/C at this time.

## 2017-12-27 NOTE — PROGRESS NOTES
Call placed to pharmacy, informed Yen of needing antibiotics (ertapenem and linezolid) before pt heads to procedure. She states that linezolid was just sent via tube system and should be received shortly. Informed her that I am looking at tube station and none received here. She states they are working on tubing the ertapenem and give the tube station a minute or two. Will continue to monitor.

## 2017-12-27 NOTE — DISCHARGE SUMMARY
[facility]  Discharge Note  Short Stay    Admit Date: 12/27/2017    Discharge Date and Time: 12/27/2017 10:00 AM     Attending Physician: Dewey Mann MD     Discharge Provider: Siddhartha Barr MD    Diagnoses:  Active Hospital Problems    Diagnosis  POA    *Urethral stricture [N35.9]  Yes      Resolved Hospital Problems    Diagnosis Date Resolved POA   No resolved problems to display.       Discharged Condition: good    Hospital Course: Patient was admitted for an outpatient cystoscopy and DVIU and tolerated the procedure well with no complications.    Final Diagnoses: Same as principal problem.    Disposition: Home or Self Care    Follow up/Patient Instructions:    Medications:  Reconciled Home Medications:   Current Discharge Medication List      START taking these medications    Details   hydrocodone-acetaminophen 5-325mg (NORCO) 5-325 mg per tablet Take 1 tablet by mouth every 6 (six) hours as needed.  Qty: 20 tablet, Refills: 0         CONTINUE these medications which have NOT CHANGED    Details   famotidine (PEPCID) 20 MG tablet Take 1 tablet (20 mg total) by mouth every evening.  Qty: 30 tablet, Refills: 11      k phos di & mono-sod phos mono (K-PHOS-NEUTRAL) 250 mg Tab Take 4 tablets by mouth 4 (four) times daily.  Qty: 480 tablet, Refills: 11      ketoconazole (NIZORAL) 200 mg Tab Take 0.5 tablets (100 mg total) by mouth once daily.  Qty: 15 tablet, Refills: 11      levothyroxine (SYNTHROID) 100 MCG tablet Take 1 tablet (100 mcg total) by mouth once daily.  Qty: 30 tablet, Refills: 11      magnesium oxide (MAG-OX) 400 mg tablet Take 1 tablet (400 mg total) by mouth once daily.  Qty: 30 tablet, Refills: 11      metoprolol tartrate (LOPRESSOR) 25 MG tablet Take 0.5 tablets (12.5 mg total) by mouth 2 (two) times daily.  Qty: 30 tablet, Refills: 11      multivitamin (ONE DAILY MULTIVITAMIN) per tablet Take 1 tablet by mouth once daily.      mycophenolate (CELLCEPT) 250 mg Cap Take 3 capsules (750 mg  total) by mouth 2 (two) times daily. Z94.0/Kidney Transplant on 11/26/16  Qty: 180 capsule, Refills: 11    Comments: Z94.0/Kidney Transplant on 11/26/16.      nitrofurantoin, macrocrystal-monohydrate, (MACROBID) 100 MG capsule Take 1 capsule (100 mg total) by mouth 2 (two) times daily.  Qty: 14 capsule, Refills: 0      predniSONE (DELTASONE) 5 MG tablet Take 1 tablet (5 mg total) by mouth once daily. Z94.0/Kidney transplant on 11/26/2016  Qty: 30 tablet, Refills: 11      sodium bicarbonate 650 MG tablet Take 3 tablets (1,950 mg total) by mouth 2 (two) times daily.  Qty: 180 tablet, Refills: 11    Comments: DOSE INCREASE      tacrolimus (PROGRAF) 1 MG Cap Take 4 capsules (4mg total) in AM & 3 capsules (3mg total) in PM by mouth daily. Z94.0/Kidney Transplant on 11/26/16  Qty: 210 capsule, Refills: 11    Comments: Z94.0/Kidney Transplant on 11/26/16      sulfamethoxazole-trimethoprim 400-80mg (BACTRIM,SEPTRA) 400-80 mg per tablet Take 1 tablet by mouth once daily. Stop: 11/26/17  Qty: 30 tablet, Refills: 11    Comments: Kidney Transplant z94.0; 11/26/16             Discharge Procedure Orders  Call MD for:  persistent nausea and vomiting or diarrhea     Call MD for:  severe uncontrolled pain     Call MD for:   Order Comments: Temperature > 101F       Follow-up Information     Dewey Mann MD In 3 months.    Specialty:  Urology  Contact information:  [address] [facility] [id number] [phone]                   Discharge Procedure Orders (must include Diet, Follow-up, Activity):    Discharge Procedure Orders (must include Diet, Follow-up, Activity)  Call MD for:  persistent nausea and vomiting or diarrhea     Call MD for:  severe uncontrolled pain     Call MD for:   Order Comments: Temperature > 101F

## 2017-12-28 NOTE — ANESTHESIA POSTPROCEDURE EVALUATION
"Anesthesia Post Evaluation    Patient: Alin Burkett    Procedure(s) Performed: Procedure(s) (LRB):  CYSTOSCOPY (N/A)  URETHROTOMY-DIRECT VISUAL INTERNAL (DVIU) (N/A)  URETHROGRAM-RETROGRADE (N/A)    Final Anesthesia Type: general  Patient location during evaluation: PACU  Patient participation: Yes- Able to Participate  Level of consciousness: awake and alert  Post-procedure vital signs: reviewed and stable  Pain management: adequate  Airway patency: patent  PONV status at discharge: No PONV  Anesthetic complications: no      Cardiovascular status: blood pressure returned to baseline  Respiratory status: spontaneous ventilation and room air  Hydration status: euvolemic  Follow-up not needed.        Visit Vitals  /69 (BP Location: Right arm, Patient Position: Lying)   Pulse 69   Temp 36.3 °C (97.4 °F) (Oral)   Resp 18   Ht 5' 11" (1.803 m)   Wt 66.2 kg (146 lb)   SpO2 99%   BMI 20.36 kg/m²       Pain/Raheem Score: Pain Assessment Performed: Yes (12/27/2017 11:00 AM)  Presence of Pain: denies (12/27/2017 11:00 AM)  Pain Rating Prior to Med Admin: 8 (12/27/2017 10:13 AM)  Raheem Score: 10 (12/27/2017 11:00 AM)      "

## 2018-01-03 ENCOUNTER — OFFICE VISIT (OUTPATIENT)
Dept: NEPHROLOGY | Facility: CLINIC | Age: 68
End: 2018-01-03
Payer: MEDICARE

## 2018-01-03 VITALS
HEIGHT: 71 IN | HEART RATE: 62 BPM | BODY MASS INDEX: 20.86 KG/M2 | WEIGHT: 149 LBS | SYSTOLIC BLOOD PRESSURE: 115 MMHG | OXYGEN SATURATION: 98 % | DIASTOLIC BLOOD PRESSURE: 78 MMHG

## 2018-01-03 DIAGNOSIS — Z87.891 HISTORY OF SMOKING 10-25 PACK YEARS: ICD-10-CM

## 2018-01-03 DIAGNOSIS — F10.11 HISTORY OF ALCOHOL ABUSE: ICD-10-CM

## 2018-01-03 DIAGNOSIS — I11.9 HYPERTENSIVE LEFT VENTRICULAR HYPERTROPHY, WITHOUT HEART FAILURE: ICD-10-CM

## 2018-01-03 DIAGNOSIS — N20.0 CALCIUM NEPHROLITHIASIS: ICD-10-CM

## 2018-01-03 DIAGNOSIS — T86.11 ACUTE REJECTION OF KIDNEY TRANSPLANT: ICD-10-CM

## 2018-01-03 DIAGNOSIS — D50.9 IRON DEFICIENCY ANEMIA, UNSPECIFIED IRON DEFICIENCY ANEMIA TYPE: ICD-10-CM

## 2018-01-03 DIAGNOSIS — Q63.2 ECTOPIC KIDNEY: ICD-10-CM

## 2018-01-03 DIAGNOSIS — Q60.2 CONGENITAL ABSENCE OF KIDNEY: ICD-10-CM

## 2018-01-03 DIAGNOSIS — E03.9 HYPOTHYROIDISM, UNSPECIFIED TYPE: ICD-10-CM

## 2018-01-03 DIAGNOSIS — G47.33 OSA (OBSTRUCTIVE SLEEP APNEA): ICD-10-CM

## 2018-01-03 DIAGNOSIS — N18.30 STAGE 3 CHRONIC KIDNEY DISEASE: Primary | Chronic | ICD-10-CM

## 2018-01-03 DIAGNOSIS — Z94.0 DECEASED-DONOR KIDNEY TRANSPLANT: ICD-10-CM

## 2018-01-03 DIAGNOSIS — I10 ESSENTIAL HYPERTENSION: ICD-10-CM

## 2018-01-03 DIAGNOSIS — Z79.60 LONG-TERM USE OF IMMUNOSUPPRESSANT MEDICATION: ICD-10-CM

## 2018-01-03 DIAGNOSIS — N25.81 SECONDARY HYPERPARATHYROIDISM, RENAL: ICD-10-CM

## 2018-01-03 DIAGNOSIS — E87.20 METABOLIC ACIDOSIS: ICD-10-CM

## 2018-01-03 PROCEDURE — 99999 PR PBB SHADOW E&M-EST. PATIENT-LVL III: CPT | Mod: PBBFAC,,, | Performed by: INTERNAL MEDICINE

## 2018-01-03 PROCEDURE — 99215 OFFICE O/P EST HI 40 MIN: CPT | Mod: S$GLB,,, | Performed by: INTERNAL MEDICINE

## 2018-01-05 NOTE — PROGRESS NOTES
Subjective:       Patient ID: Alin Burkett is a 67 y.o. Black or  male who presents for new evaluation of Consult; transplant done 2016; and Chronic Kidney Disease    HPI     He is referred by kidney transplant for CKD stage 3. He was previously followed by Dr. Merchant of nephrology but pt wishes to change nephrologists after he attempted to schedule an appointment to see him and was unsuccessful after four times. This caused delay in pt having secondary insurance as he needed a letter from Dr. Merchant.     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 He required Hemodialysis since  and received a  donor kidney in 2016 complicated by rejection. His baseline creatinine is around 1.5-1.6mg/dL. He denies allograft pain and no problems with urination but needs urethral dilatation intermittently, follows with Dr. Mann. No recent illness. He avoids NSAIDS and no herbal medications.He is compliant with medications and denies any side effects. BP is typically well controlled. No DM    Review of Systems   Constitutional: Negative for activity change, appetite change, fatigue, fever and unexpected weight change.   HENT: Negative for facial swelling.    Respiratory: Negative for cough and shortness of breath.    Cardiovascular: Negative for chest pain and leg swelling.   Gastrointestinal: Negative for abdominal pain, constipation and diarrhea.   Genitourinary: Positive for hematuria (when self caths). Negative for difficulty urinating, dysuria and frequency.   Musculoskeletal: Negative for arthralgias.   Skin: Negative for rash.   Neurological: Negative for tremors, weakness and headaches.   Hematological: Does not bruise/bleed easily.   Psychiatric/Behavioral: Negative for sleep disturbance.       Objective:      Physical Exam   Constitutional: He is oriented to person, place, and time. He appears well-developed and well-nourished. No distress.   Neck: No JVD present.    Cardiovascular: S1 normal and S2 normal.  Exam reveals no friction rub.    Pulmonary/Chest: Breath sounds normal. He has no wheezes. He has no rales.   Abdominal: Soft.   Musculoskeletal: He exhibits no edema.   Neurological: He is alert and oriented to person, place, and time.   Skin: Skin is warm and dry.   Psychiatric: He has a normal mood and affect.   Vitals reviewed.      Assessment:       1. Stage 3 chronic kidney disease    2. -donor kidney transplant 16    3. Acute vascular rejection of kidney transplant 17    4. Other specified causes of urethral stricture    5. History of alcohol abuse, quit     6. Essential hypertension    7. Hypertensive left ventricular hypertrophy, without heart failure    8. Calcium nephrolithiasis    9. Ectopic kidney    10. Congenital absence of kidney    11. Metabolic acidosis    12. Secondary hyperparathyroidism, renal    13. Hypothyroidism, unspecified type    14. History of smoking 10-25 pack years, quit , 20 pack-years    15. LOUISA (obstructive sleep apnea), CPAP refused    16. Long-term use of immunosuppressant medication        Plan:             ESRD s/p kidney transplant and resultant CKD Stage 3.     HTN is controlled    Mineral and Bone disease--due for PTH. Continue exogenous bicarbonate    Mild anemia--check iron stores, B12 and folate    Needs screen for DM      RTC 3 months with labs prior

## 2018-01-08 ENCOUNTER — TELEPHONE (OUTPATIENT)
Dept: TRANSPLANT | Facility: CLINIC | Age: 68
End: 2018-01-08

## 2018-01-08 NOTE — TELEPHONE ENCOUNTER
----- Message from Garrett Pablo sent at 1/8/2018  9:42 AM CST -----  Contact: Pt   Pt would like a call regarding his medication, please call him today @ [phone]     He states his medication will run out today.     tacrolimus (PROGRAF) 1 MG Cap AND mycophenolate (CELLCEPT) 250 mg Cap    Pharmacy [phone]

## 2018-01-09 ENCOUNTER — LAB VISIT (OUTPATIENT)
Dept: LAB | Facility: HOSPITAL | Age: 68
End: 2018-01-09
Attending: INTERNAL MEDICINE
Payer: MEDICARE

## 2018-01-09 DIAGNOSIS — E03.9 HYPOTHYROIDISM, UNSPECIFIED TYPE: ICD-10-CM

## 2018-01-09 DIAGNOSIS — E27.8 ADRENAL INCIDENTALOMA: ICD-10-CM

## 2018-01-09 DIAGNOSIS — M89.9 DISORDER OF BONE: ICD-10-CM

## 2018-01-09 LAB
25(OH)D3+25(OH)D2 SERPL-MCNC: 25 NG/ML
ANION GAP SERPL CALC-SCNC: 9 MMOL/L
BUN SERPL-MCNC: 17 MG/DL
CALCIUM SERPL-MCNC: 9.1 MG/DL
CHLORIDE SERPL-SCNC: 109 MMOL/L
CO2 SERPL-SCNC: 26 MMOL/L
CREAT SERPL-MCNC: 1.5 MG/DL
EST. GFR  (AFRICAN AMERICAN): 55.3 ML/MIN/1.73 M^2
EST. GFR  (NON AFRICAN AMERICAN): 47.8 ML/MIN/1.73 M^2
GLUCOSE SERPL-MCNC: 85 MG/DL
POTASSIUM SERPL-SCNC: 3.9 MMOL/L
SODIUM SERPL-SCNC: 144 MMOL/L
T4 FREE SERPL-MCNC: 1 NG/DL
TSH SERPL DL<=0.005 MIU/L-ACNC: 4.74 UIU/ML

## 2018-01-09 PROCEDURE — 82088 ASSAY OF ALDOSTERONE: CPT

## 2018-01-09 PROCEDURE — 84244 ASSAY OF RENIN: CPT

## 2018-01-09 PROCEDURE — 84443 ASSAY THYROID STIM HORMONE: CPT

## 2018-01-09 PROCEDURE — 36415 COLL VENOUS BLD VENIPUNCTURE: CPT | Mod: PO

## 2018-01-09 PROCEDURE — 80048 BASIC METABOLIC PNL TOTAL CA: CPT

## 2018-01-09 PROCEDURE — 84439 ASSAY OF FREE THYROXINE: CPT

## 2018-01-09 PROCEDURE — 83835 ASSAY OF METANEPHRINES: CPT

## 2018-01-09 PROCEDURE — 82306 VITAMIN D 25 HYDROXY: CPT

## 2018-01-11 LAB — ALDOST SERPL-MCNC: 24.6 NG/DL

## 2018-01-12 ENCOUNTER — DOCUMENTATION ONLY (OUTPATIENT)
Dept: FAMILY MEDICINE | Facility: CLINIC | Age: 68
End: 2018-01-12

## 2018-01-12 LAB — RENIN PLAS-CCNC: 0.9 NG/ML/H

## 2018-01-12 NOTE — PROGRESS NOTES
Pre-Visit Chart Review  For Appointment Scheduled on 1/15/18    Health Maintenance Due   Topic Date Due    TETANUS VACCINE  01[birth year]21/1968    Zoster Vaccine  01/21/2011    Pneumococcal (65+) (1 of 2 - PCV13) 01/21/2016    Influenza Vaccine  08/01/2017

## 2018-01-13 LAB
METANEPH FREE SERPL-MCNC: 59 PG/ML
METANEPHS SERPL-MCNC: 133 PG/ML
NORMETANEPH FREE SERPL-MCNC: 74 PG/ML

## 2018-01-24 ENCOUNTER — OFFICE VISIT (OUTPATIENT)
Dept: ENDOCRINOLOGY | Facility: CLINIC | Age: 68
End: 2018-01-24
Payer: MEDICARE

## 2018-01-24 VITALS
HEIGHT: 71 IN | BODY MASS INDEX: 20.87 KG/M2 | SYSTOLIC BLOOD PRESSURE: 116 MMHG | WEIGHT: 149.06 LBS | DIASTOLIC BLOOD PRESSURE: 74 MMHG

## 2018-01-24 DIAGNOSIS — E03.9 HYPOTHYROIDISM, UNSPECIFIED TYPE: ICD-10-CM

## 2018-01-24 DIAGNOSIS — M85.80 OSTEOPENIA, UNSPECIFIED LOCATION: ICD-10-CM

## 2018-01-24 DIAGNOSIS — R79.89 ELEVATED PLASMA METANEPHRINES: ICD-10-CM

## 2018-01-24 DIAGNOSIS — E27.8 ADRENAL INCIDENTALOMA: Primary | ICD-10-CM

## 2018-01-24 PROCEDURE — 99214 OFFICE O/P EST MOD 30 MIN: CPT | Mod: S$GLB,,, | Performed by: INTERNAL MEDICINE

## 2018-01-24 PROCEDURE — 99999 PR PBB SHADOW E&M-EST. PATIENT-LVL IV: CPT | Mod: PBBFAC,,, | Performed by: INTERNAL MEDICINE

## 2018-01-24 NOTE — PROGRESS NOTES
"Mr. Burkett is a 68 y.o. M with history of hypothyroidism, adrenal incidentalomas, ESRD s/p transplant 11/26/16, here for followup.       Regarding his hypothyroidism: s/p 33mCi  on 09/27/2012.  In the past thyroid Us with only small cystic lesions. Pt is currently on 112mcg for levothyroxine daily as chronic dose.  His TSH in Oct 2017 was suppressed so I decreased his levothyroxine from 112 to 100mcg daily - recent labs show very minimally elevated TSH in Jan 2018 (below). He denies palpitations or tremors or recent weight changes.      Regarding his bilateraly adrenal incidentalomas: initially found as part of kidney transplant workup in 2011 - 1cm bilateral adrenal nodules. On CT in Oct 2012 showed "stable" imaging with hormonal evaluation with 2012 labs showing ON Dex suppression with cortiso of 3.3.  Over the years (including in Jan 2018) he continues to have very minimally elevated plasma free metanephrines (but stable) - he also has had impressive kathrine/renin ratios in Jan 2018 and in the past, although his BP is well controlled on metoprolol and no hypokalemia.  He had a recent CT in Oct 2016 noncontrast of the abdomen which on review w radiology (although limited by lack of contrast) showed stable bl adrenal adenomas.      We also did a Nov 2017 DXA which showed osteopenia not meeting criteria for treatment.     On 5mg prednisone daily in addition to cellcept and prograf.  His kidney transplant was complicated by rejection.             Component      Latest Ref Rng & Units 1/9/2018 10/3/2017   Sodium      136 - 145 mmol/L 144    Potassium      3.5 - 5.1 mmol/L 3.9    Chloride      95 - 110 mmol/L 109    CO2      23 - 29 mmol/L 26    Glucose      70 - 110 mg/dL 85    BUN, Bld      8 - 23 mg/dL 17    Creatinine      0.5 - 1.4 mg/dL 1.5 (H)    Calcium      8.7 - 10.5 mg/dL 9.1    Anion Gap      8 - 16 mmol/L 9    eGFR if African American      >60 mL/min/1.73 m:2 55.3 (A)    eGFR if non       >60 " mL/min/1.73 m:2 47.8 (A)    Metanephrine, Free      < OR = 57 pg/mL 59 (H)    Metanephrine, Total, Plasma      < OR = 205 pg/mL 133    Normetanephrine, Free      < OR = 148 pg/mL 74    Free T4      0.71 - 1.51 ng/dL 1.00 1.08   TSH      0.400 - 4.000 uIU/mL 4.735 (H) 0.298 (L)   Renin Activity      ng/mL/h 0.9    Aldosterone      ng/dL 24.6    Vit D, 25-Hydroxy      30 - 96 ng/mL 25 (L)        Component      Latest Ref Rng & Units 10/3/2017             TSH      0.400 - 4.000 uIU/mL 0.298 (L)   Vit D, 25-Hydroxy      30 - 96 ng/mL    Renin Activity      ng/mL/h    Aldosterone      ng/dL    Free T4      0.71 - 1.51 ng/dL 1.08   PTH      9.0 - 77.0 pg/mL    T3, Free      2.3 - 4.2 pg/mL 2.0 (L)     Component      Latest Ref Rng & Units 2012 2012 2012 5/15/2012                Metanephrine, Free      see fn pg/mL 111 (H)  95 (H) 81 (H)   Normetanephrine, Free      see fn pg/mL 83  132 106   Metanephrine, Total, Plasma      see fn pg/mL 194  227 (H) 187     Component      Latest Ref Rng & Units 2012 2012 2012 5/15/2012                Renin Activity      ng/mL/h <0.6   <0.6   Aldosterone      ng/dL 9.3   18.8     Past Medical History:   Diagnosis Date    Acidosis     Adrenal adenoma     Anemia associated with chronic renal failure     Arrhythmia, onset 2015    Awaiting organ transplant status 2013    Basal cell carcinoma 2012    left nasal tip    Blood type B+ 2013    Cancer     Celiac artery dissection     Chronic diarrhea     Chronic urethral stricture     Congenital absence of kidney     left    -donor kidney transplant 16     Induced w Campath 30 mg IV intraoperatively & SoluMedrol 875 mg total over 3 days.  Renal allograft biopsy 17 (DIVINE): 21 glomeruli, none globally sclerosed, <5% interstitial fibrosis, no ACR, c4d negative, AVR CCT Type 2 (V1 lesion); plan THYMO     Dissecting aortic aneurysm (any part), abdominal      Diverticulosis     Encounter for blood transfusion     ESRD (end stage renal disease) 06/16/2010    H/O: urethral stricture     History of AAA (abdominal aortic aneurysm) repair     Hypertension     Hypokalemia     Hypothyroidism 1/10/2014    Inguinal hernia bilateral, non-recurrent     Kidney stones     Organ transplant candidate 11/26/2013    Plantar warts 1/10/2014    Recurrent UTI 7/28/2017    S/P kidney transplant     Secondary hyperparathyroidism, renal     Thyroid disease         reports that he quit smoking about 7 years ago. His smoking use included Cigarettes. He quit after 40.00 years of use. He has never used smokeless tobacco. He reports that he does not drink alcohol or use drugs.    Family History   Problem Relation Age of Onset    Diabetes Mother     Cancer Brother      thyroid cancer    Stroke Maternal Aunt     Kidney disease Paternal Uncle     Kidney disease Cousin     Melanoma Neg Hx     Psoriasis Neg Hx     Lupus Neg Hx     Eczema Neg Hx     Colon cancer Neg Hx     Colon polyps Neg Hx     Crohn's disease Neg Hx     Ulcerative colitis Neg Hx     Celiac disease Neg Hx        Past Surgical History:   Procedure Laterality Date    ABDOMINAL SURGERY      exploratory lapatomy x 2    AORTA - SUPERIOR MESENTERIC ARTERY BYPASS GRAFT      BLADDER NECK RECONSTRUCTION      BLADDER SURGERY      COLONOSCOPY  10/10/2013    Dr. Gutierrez, repeat in 5 years    CYSTOSCOPY      GASTROJEJUNOSTOMY      HEMORRHOID SURGERY      HERNIA REPAIR      KIDNEY TRANSPLANT      LITHOTRIPSY      PERCUTANEOUS NEPHROLITHOTRIPSY      right  ESWL  10/31/12    right ESWL  6/26/12       Patient's Medications   New Prescriptions    No medications on file   Previous Medications    FAMOTIDINE (PEPCID) 20 MG TABLET    Take 1 tablet (20 mg total) by mouth every evening.    K PHOS DI & MONO-SOD PHOS MONO (K-PHOS-NEUTRAL) 250 MG TAB    Take 4 tablets by mouth 4 (four) times daily.    KETOCONAZOLE  "(NIZORAL) 200 MG TAB    Take 0.5 tablets (100 mg total) by mouth once daily.    LEVOTHYROXINE (SYNTHROID) 100 MCG TABLET    Take 1 tablet (100 mcg total) by mouth once daily.    MAGNESIUM OXIDE (MAG-OX) 400 MG TABLET    Take 1 tablet (400 mg total) by mouth once daily.    METOPROLOL TARTRATE (LOPRESSOR) 25 MG TABLET    Take 0.5 tablets (12.5 mg total) by mouth 2 (two) times daily.    MULTIVITAMIN (ONE DAILY MULTIVITAMIN) PER TABLET    Take 1 tablet by mouth once daily.    MYCOPHENOLATE (CELLCEPT) 250 MG CAP    Take 3 capsules (750 mg total) by mouth 2 (two) times daily. Z94.0/Kidney Transplant on 11/26/16    PREDNISONE (DELTASONE) 5 MG TABLET    Take 1 tablet (5 mg total) by mouth once daily. Z94.0/Kidney transplant on 11/26/2016    SILDENAFIL (VIAGRA) 100 MG TABLET    Take 1 tablet (100 mg total) by mouth daily as needed for Erectile Dysfunction.    SODIUM BICARBONATE 650 MG TABLET    Take 3 tablets (1,950 mg total) by mouth 2 (two) times daily.    SULFAMETHOXAZOLE-TRIMETHOPRIM 400-80MG (BACTRIM,SEPTRA) 400-80 MG PER TABLET    Take 1 tablet by mouth once daily. Stop: 11/26/17    TACROLIMUS (PROGRAF) 1 MG CAP    Take 4 capsules (4mg total) in AM & 3 capsules (3mg total) in PM by mouth daily. Z94.0/Kidney Transplant on 11/26/16   Modified Medications    No medications on file   Discontinued Medications    No medications on file         Review of Systems:  General: no fevers  Eyes: no vision changes  ENT: no sore throat  Lung: no sob  CV: no palpitations  GI: no nausea   : no dysuria   Endo: no heat interolance  Heme: no easy bruising   MSK: no joint swelling        /74   Ht 5' 11" (1.803 m)   Wt 67.6 kg (149 lb 0.5 oz)   BMI 20.79 kg/m²     Physical Exam:  General: thin body habitus, not in acute distress   Eyes: anicteric, no proptosis   ENT: no facial lesions, no oral lesions   Skin: exposed skin without bruising or bleeding   Ext: no peripheral edema or erythema  Neuro: AOx3, moving all extremities, " normal gait     Labs:    Chemistry        Component Value Date/Time     01/09/2018 0833     01/09/2018 0833    K 3.7 01/09/2018 0833    K 3.9 01/09/2018 0833     01/09/2018 0833     01/09/2018 0833    CO2 23 01/09/2018 0833    CO2 26 01/09/2018 0833    BUN 17 01/09/2018 0833    BUN 17 01/09/2018 0833    CREATININE 1.6 (H) 01/09/2018 0833    CREATININE 1.5 (H) 01/09/2018 0833    GLU 87 01/09/2018 0833    GLU 85 01/09/2018 0833        Component Value Date/Time    CALCIUM 9.3 01/09/2018 0833    CALCIUM 9.1 01/09/2018 0833    ALKPHOS 64 11/07/2017 0717    AST 25 11/07/2017 0717    ALT 28 11/07/2017 0717    BILITOT 0.6 11/07/2017 0717    ESTGFRAFRICA 51.1 (A) 01/09/2018 0833    ESTGFRAFRICA 55.3 (A) 01/09/2018 0833    EGFRNONAA 44.2 (A) 01/09/2018 0833    EGFRNONAA 47.8 (A) 01/09/2018 0833          Lab Results   Component Value Date    WBC 4.12 01/09/2018    HGB 12.8 (L) 01/09/2018    HCT 40.5 01/09/2018    MCV 96 01/09/2018     (L) 01/09/2018        Lab Results   Component Value Date    HDL 58 02/20/2017    HDL 45 11/26/2016    HDL 39 (L) 06/21/2016     Lab Results   Component Value Date    LDLCALC 71.8 02/20/2017    LDLCALC 52.2 (L) 11/26/2016    LDLCALC 45.6 (L) 06/21/2016     Lab Results   Component Value Date    TRIG 131 02/20/2017    TRIG 49 11/26/2016    TRIG 62 06/21/2016     Lab Results   Component Value Date    CHOLHDL 37.2 02/20/2017    CHOLHDL 42.1 11/26/2016    CHOLHDL 40.2 06/21/2016       Hemoglobin A1C   Date Value Ref Range Status   02/08/2012 5.2 4.0 - 6.2 % Final       Lab Results   Component Value Date    TSH 4.735 (H) 01/09/2018         Assessment and Plan:    Mr. Burkett with history of hypothyroidism, adrenal incidentalomas, ESRD s/p transplant 11/26/16, here for reestablishing care.    1. Adrenal nodules: stable in size, mildly elevated plasma metanephrines likely false positive, unclear significance of hyperaldosteronism w lower renin without significant  hypertension or hyperkalemia  Pt on prednisone thus unclear usefulness of hypercortisolism testing especially without signs/symptoms  Recommend 24hr urine metanephrines, intermittently monitor size on imaging and hormonal excess    2. Hypothyroidism: mildly elevated TSH -continue 100mcg levothyroxine daily - repeat TFTs in 2 months (added onto transplant labs)    3. Osteopenia: risk factors including steroid use and hx of ESRD; recommend 2000 units vitD daily and 2-3 servings of calcium daily (per pt this is achievable)    RTC 1 year    Marcin Pineda M.D.  Endocrinology

## 2018-02-05 RX ORDER — SULFAMETHOXAZOLE AND TRIMETHOPRIM 400; 80 MG/1; MG/1
1 TABLET ORAL DAILY
Qty: 30 TABLET | Refills: 4 | Status: SHIPPED | OUTPATIENT
Start: 2018-02-05 | End: 2018-02-07 | Stop reason: SDUPTHER

## 2018-02-07 RX ORDER — SULFAMETHOXAZOLE AND TRIMETHOPRIM 400; 80 MG/1; MG/1
1 TABLET ORAL DAILY
Qty: 30 TABLET | Refills: 4 | Status: SHIPPED | OUTPATIENT
Start: 2018-02-07 | End: 2018-02-20 | Stop reason: SDUPTHER

## 2018-02-08 ENCOUNTER — LAB VISIT (OUTPATIENT)
Dept: LAB | Facility: HOSPITAL | Age: 68
End: 2018-02-08
Attending: INTERNAL MEDICINE
Payer: MEDICARE

## 2018-02-08 DIAGNOSIS — Z94.0 KIDNEY REPLACED BY TRANSPLANT: ICD-10-CM

## 2018-02-08 PROCEDURE — 80197 ASSAY OF TACROLIMUS: CPT

## 2018-02-08 PROCEDURE — 36415 COLL VENOUS BLD VENIPUNCTURE: CPT | Mod: PO

## 2018-02-09 DIAGNOSIS — Z94.0 KIDNEY REPLACED BY TRANSPLANT: Primary | ICD-10-CM

## 2018-02-09 LAB — TACROLIMUS BLD-MCNC: 6.8 NG/ML

## 2018-02-09 RX ORDER — MYCOPHENOLATE MOFETIL 250 MG/1
750 CAPSULE ORAL 2 TIMES DAILY
Qty: 540 CAPSULE | Refills: 3 | Status: SHIPPED | OUTPATIENT
Start: 2018-02-09 | End: 2019-03-04 | Stop reason: SDUPTHER

## 2018-02-09 RX ORDER — TACROLIMUS 1 MG/1
CAPSULE ORAL
Qty: 630 CAPSULE | Refills: 3 | Status: SHIPPED | OUTPATIENT
Start: 2018-02-09 | End: 2018-05-10 | Stop reason: SDUPTHER

## 2018-02-09 NOTE — TELEPHONE ENCOUNTER
Patient called to request prescription for Cellcept & Prograf be routed to [facility] since his insurance changed.

## 2018-02-20 RX ORDER — PREDNISONE 5 MG/1
5 TABLET ORAL DAILY
Qty: 90 TABLET | Refills: 3 | Status: SHIPPED | OUTPATIENT
Start: 2018-02-20 | End: 2019-03-04 | Stop reason: SDUPTHER

## 2018-02-20 RX ORDER — METOPROLOL TARTRATE 25 MG/1
12.5 TABLET, FILM COATED ORAL 2 TIMES DAILY
Qty: 90 TABLET | Refills: 3 | Status: SHIPPED | OUTPATIENT
Start: 2018-02-20 | End: 2019-03-04 | Stop reason: SDUPTHER

## 2018-02-20 RX ORDER — LANOLIN ALCOHOL/MO/W.PET/CERES
400 CREAM (GRAM) TOPICAL DAILY
Qty: 90 TABLET | Refills: 3 | Status: SHIPPED | OUTPATIENT
Start: 2018-02-20 | End: 2019-03-04 | Stop reason: SDUPTHER

## 2018-02-20 RX ORDER — SODIUM BICARBONATE 650 MG/1
1950 TABLET ORAL 2 TIMES DAILY
Qty: 540 TABLET | Refills: 3 | Status: SHIPPED | OUTPATIENT
Start: 2018-02-20 | End: 2018-09-04 | Stop reason: SDUPTHER

## 2018-02-20 RX ORDER — KETOCONAZOLE 200 MG/1
100 TABLET ORAL DAILY
Qty: 45 TABLET | Refills: 3 | Status: SHIPPED | OUTPATIENT
Start: 2018-02-20 | End: 2019-03-04 | Stop reason: SDUPTHER

## 2018-02-20 RX ORDER — FAMOTIDINE 20 MG/1
20 TABLET, FILM COATED ORAL NIGHTLY
Qty: 90 TABLET | Refills: 3 | Status: SHIPPED | OUTPATIENT
Start: 2018-02-20 | End: 2019-03-04 | Stop reason: SDUPTHER

## 2018-02-20 RX ORDER — SULFAMETHOXAZOLE AND TRIMETHOPRIM 400; 80 MG/1; MG/1
1 TABLET ORAL DAILY
Qty: 30 TABLET | Refills: 4 | Status: SHIPPED | OUTPATIENT
Start: 2018-02-20 | End: 2018-05-14 | Stop reason: ALTCHOICE

## 2018-02-20 RX ORDER — LEVOTHYROXINE SODIUM 100 UG/1
100 TABLET ORAL DAILY
Qty: 90 TABLET | Refills: 3 | Status: SHIPPED | OUTPATIENT
Start: 2018-02-20 | End: 2018-06-06 | Stop reason: SDUPTHER

## 2018-02-20 NOTE — TELEPHONE ENCOUNTER
----- Message from Courtney Leija sent at 2/20/2018  8:03 AM CST -----  Contact: pt  Pt calling bc rx needs pre authorizations and [facility]ills at [facility] [phone]  k phos di & mono-sod phos mono (K-PHOS-NEUTRAL) 250 mg Tab  ketoconazole (NIZORAL) 200 mg Tab  levothyroxine (SYNTHROID) 100 MCG tablet  metoprolol tartrate (LOPRESSOR) 25 MG tablet  sulfamethoxazole-trimethoprim 400-80mg (BACTRIM,SEPTRA) 400-80 mg per tablet    Pt called yesterday and has not heard back.    Pt is running out of medications    Pt contact [phone]

## 2018-03-06 ENCOUNTER — TELEPHONE (OUTPATIENT)
Dept: TRANSPLANT | Facility: CLINIC | Age: 68
End: 2018-03-06

## 2018-03-06 NOTE — TELEPHONE ENCOUNTER
Received a call from Dr. Winston, patient's dentist requesting recommendations for patient having oral surgery.   Call forwarded to Dr. Mcclain.    Plan given by Dr. Mcclain.:  We need to do labs (cylex, CBC., RFP, CMV, PCR & Tacro) few days before procedure. The dental office will call transplant and tell the exact day of extraction.  We will HOLD Cellcept 1-2 days before surgery for 2 weeks for wound healing & possible abscess development.

## 2018-03-28 ENCOUNTER — LAB VISIT (OUTPATIENT)
Dept: LAB | Facility: HOSPITAL | Age: 68
End: 2018-03-28
Attending: INTERNAL MEDICINE
Payer: MEDICARE

## 2018-03-28 DIAGNOSIS — E03.9 HYPOTHYROIDISM, UNSPECIFIED TYPE: ICD-10-CM

## 2018-03-28 DIAGNOSIS — Z94.0 DECEASED-DONOR KIDNEY TRANSPLANT: ICD-10-CM

## 2018-03-28 DIAGNOSIS — D50.9 IRON DEFICIENCY ANEMIA, UNSPECIFIED IRON DEFICIENCY ANEMIA TYPE: ICD-10-CM

## 2018-03-28 DIAGNOSIS — N18.30 STAGE 3 CHRONIC KIDNEY DISEASE: Chronic | ICD-10-CM

## 2018-03-28 LAB
25(OH)D3+25(OH)D2 SERPL-MCNC: 29 NG/ML
ALBUMIN SERPL BCP-MCNC: 3.6 G/DL
ALBUMIN SERPL BCP-MCNC: 3.6 G/DL
ALP SERPL-CCNC: 66 U/L
ALT SERPL W/O P-5'-P-CCNC: 24 U/L
ANION GAP SERPL CALC-SCNC: 10 MMOL/L
AST SERPL-CCNC: 22 U/L
BILIRUB DIRECT SERPL-MCNC: 0.2 MG/DL
BILIRUB SERPL-MCNC: 0.4 MG/DL
BUN SERPL-MCNC: 23 MG/DL
CALCIUM SERPL-MCNC: 8.7 MG/DL
CHLORIDE SERPL-SCNC: 108 MMOL/L
CO2 SERPL-SCNC: 27 MMOL/L
CREAT SERPL-MCNC: 1.8 MG/DL
EST. GFR  (AFRICAN AMERICAN): 44 ML/MIN/1.73 M^2
EST. GFR  (NON AFRICAN AMERICAN): 38.1 ML/MIN/1.73 M^2
ESTIMATED AVG GLUCOSE: 105 MG/DL
FERRITIN SERPL-MCNC: 1478 NG/ML
FOLATE SERPL-MCNC: 11.7 NG/ML
GLUCOSE SERPL-MCNC: 81 MG/DL
HBA1C MFR BLD HPLC: 5.3 %
IRON SERPL-MCNC: 76 UG/DL
PHOSPHATE SERPL-MCNC: 4.7 MG/DL
POTASSIUM SERPL-SCNC: 3.7 MMOL/L
PROT SERPL-MCNC: 6.6 G/DL
PTH-INTACT SERPL-MCNC: 920 PG/ML
SATURATED IRON: 23 %
SODIUM SERPL-SCNC: 145 MMOL/L
TOTAL IRON BINDING CAPACITY: 337 UG/DL
TRANSFERRIN SERPL-MCNC: 228 MG/DL
TSH SERPL DL<=0.005 MIU/L-ACNC: 3.83 UIU/ML
VIT B12 SERPL-MCNC: 410 PG/ML

## 2018-03-28 PROCEDURE — 82728 ASSAY OF FERRITIN: CPT

## 2018-03-28 PROCEDURE — 82746 ASSAY OF FOLIC ACID SERUM: CPT

## 2018-03-28 PROCEDURE — 82306 VITAMIN D 25 HYDROXY: CPT

## 2018-03-28 PROCEDURE — 36415 COLL VENOUS BLD VENIPUNCTURE: CPT | Mod: PO

## 2018-03-28 PROCEDURE — 84075 ASSAY ALKALINE PHOSPHATASE: CPT

## 2018-03-28 PROCEDURE — 83036 HEMOGLOBIN GLYCOSYLATED A1C: CPT

## 2018-03-28 PROCEDURE — 80197 ASSAY OF TACROLIMUS: CPT

## 2018-03-28 PROCEDURE — 80069 RENAL FUNCTION PANEL: CPT

## 2018-03-28 PROCEDURE — 82607 VITAMIN B-12: CPT

## 2018-03-28 PROCEDURE — 83970 ASSAY OF PARATHORMONE: CPT

## 2018-03-28 PROCEDURE — 83540 ASSAY OF IRON: CPT

## 2018-03-28 PROCEDURE — 84443 ASSAY THYROID STIM HORMONE: CPT

## 2018-03-29 LAB — TACROLIMUS BLD-MCNC: 10 NG/ML

## 2018-04-02 ENCOUNTER — PATIENT MESSAGE (OUTPATIENT)
Dept: ENDOCRINOLOGY | Facility: CLINIC | Age: 68
End: 2018-04-02

## 2018-04-02 NOTE — TELEPHONE ENCOUNTER
Component      Latest Ref Rng & Units 3/28/2018           7:09 AM   TSH      0.400 - 4.000 uIU/mL 3.834

## 2018-04-16 ENCOUNTER — TELEPHONE (OUTPATIENT)
Dept: NEPHROLOGY | Facility: CLINIC | Age: 68
End: 2018-04-16

## 2018-04-16 ENCOUNTER — OFFICE VISIT (OUTPATIENT)
Dept: NEPHROLOGY | Facility: CLINIC | Age: 68
End: 2018-04-16
Payer: MEDICARE

## 2018-04-16 VITALS
WEIGHT: 149.5 LBS | OXYGEN SATURATION: 100 % | DIASTOLIC BLOOD PRESSURE: 78 MMHG | SYSTOLIC BLOOD PRESSURE: 136 MMHG | HEART RATE: 69 BPM | HEIGHT: 71 IN | BODY MASS INDEX: 20.93 KG/M2

## 2018-04-16 DIAGNOSIS — N25.81 SECONDARY HYPERPARATHYROIDISM, RENAL: ICD-10-CM

## 2018-04-16 DIAGNOSIS — E87.20 METABOLIC ACIDOSIS: ICD-10-CM

## 2018-04-16 DIAGNOSIS — I11.9 HYPERTENSIVE LEFT VENTRICULAR HYPERTROPHY, WITHOUT HEART FAILURE: ICD-10-CM

## 2018-04-16 DIAGNOSIS — Z94.0 KIDNEY TRANSPLANT RECIPIENT: Primary | ICD-10-CM

## 2018-04-16 DIAGNOSIS — N20.0 CALCIUM NEPHROLITHIASIS: ICD-10-CM

## 2018-04-16 DIAGNOSIS — D63.8 ANEMIA OF CHRONIC DISEASE: ICD-10-CM

## 2018-04-16 DIAGNOSIS — Z29.89 PROPHYLACTIC IMMUNOTHERAPY: ICD-10-CM

## 2018-04-16 DIAGNOSIS — E03.9 HYPOTHYROIDISM, UNSPECIFIED TYPE: ICD-10-CM

## 2018-04-16 DIAGNOSIS — N18.30 STAGE 3 CHRONIC KIDNEY DISEASE: Chronic | ICD-10-CM

## 2018-04-16 DIAGNOSIS — Z87.891 HISTORY OF SMOKING 10-25 PACK YEARS: ICD-10-CM

## 2018-04-16 DIAGNOSIS — T86.11 ACUTE REJECTION OF KIDNEY TRANSPLANT: ICD-10-CM

## 2018-04-16 DIAGNOSIS — I10 ESSENTIAL HYPERTENSION: ICD-10-CM

## 2018-04-16 DIAGNOSIS — Z94.0 DECEASED-DONOR KIDNEY TRANSPLANT: ICD-10-CM

## 2018-04-16 PROBLEM — N17.9 AKI (ACUTE KIDNEY INJURY): Status: RESOLVED | Noted: 2017-01-06 | Resolved: 2018-04-16

## 2018-04-16 PROCEDURE — 3075F SYST BP GE 130 - 139MM HG: CPT | Mod: S$GLB,,, | Performed by: INTERNAL MEDICINE

## 2018-04-16 PROCEDURE — 99999 PR PBB SHADOW E&M-EST. PATIENT-LVL IV: CPT | Mod: PBBFAC,,, | Performed by: INTERNAL MEDICINE

## 2018-04-16 PROCEDURE — 99214 OFFICE O/P EST MOD 30 MIN: CPT | Mod: S$GLB,,, | Performed by: INTERNAL MEDICINE

## 2018-04-16 PROCEDURE — 3078F DIAST BP <80 MM HG: CPT | Mod: S$GLB,,, | Performed by: INTERNAL MEDICINE

## 2018-04-16 NOTE — PROGRESS NOTES
Subjective:       Patient ID: Alin Burkett is a 68 y.o. Black or  male who presents for new evaluation of Chronic Kidney Disease and Kidney Transplant    HPI     Initial visit: 2018: He is referred by kidney transplant for CKD stage 3. He was previously followed by Dr. Merchant of nephrology but pt wishes to change nephrologists after he attempted to schedule an appointment to see him and was unsuccessful after four times. This caused delay in pt having secondary insurance as he needed a letter from Dr. Merchant.   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 He required Hemodialysis since  and received a  donor kidney in 2016 complicated by rejection. His baseline creatinine is around 1.5-1.6mg/dL. He denies allograft pain and no problems with urination but needs urethral dilatation intermittently, follows with Dr. Mann. No recent illness. He avoids NSAIDS and no herbal medications.He is compliant with medications and denies any side effects. BP is typically well controlled. No DM    Interval history: 2018: he reports he is doing well. He is to have a tooth pulled soon. No recent bronchitis nor illness. He escaped the flu thus far. He denies allograft pain and no problems with urination. No edema and no SOB. Appetite is good and weight is stable. He got 'off track' with Prograf q12 but has since resumed a better schedule.       Review of Systems   Constitutional: Negative for activity change, appetite change, fatigue, fever and unexpected weight change.   HENT: Negative for facial swelling.    Respiratory: Negative for cough and shortness of breath.    Cardiovascular: Negative for chest pain and leg swelling.   Gastrointestinal: Negative for abdominal pain, constipation and diarrhea.   Genitourinary: Positive for hematuria (when self caths). Negative for difficulty urinating, dysuria and frequency.   Musculoskeletal: Negative for arthralgias.   Skin:  Negative for rash.   Neurological: Negative for tremors, weakness and headaches.   Hematological: Does not bruise/bleed easily.   Psychiatric/Behavioral: Negative for sleep disturbance.       Objective:      Physical Exam   Constitutional: He is oriented to person, place, and time. He appears well-developed and well-nourished. No distress.   Neck: No JVD present.   Cardiovascular: S1 normal and S2 normal.  Exam reveals no friction rub.    Pulmonary/Chest: Breath sounds normal. He has no wheezes. He has no rales.   Abdominal: Soft.   Musculoskeletal: He exhibits no edema.   Neurological: He is alert and oriented to person, place, and time.   Skin: Skin is warm and dry.   Psychiatric: He has a normal mood and affect.   Vitals reviewed.      Assessment:       1. Kidney transplant recipient    2. Stage 3 chronic kidney disease    3. Hypertensive left ventricular hypertrophy, without heart failure    4. Essential hypertension    5. -donor kidney transplant 16    6. Acute vascular rejection of kidney transplant 17    7. Secondary hyperparathyroidism, renal    8. Anemia of chronic disease    9. History of smoking 10-25 pack years, quit , 20 pack-years    10. Prophylactic immunotherapy (transplant immunosuppression)    11. Hypothyroidism, unspecified type    12. Metabolic acidosis    13. Calcium nephrolithiasis        Plan:             ESRD s/p kidney transplant and resultant CKD Stage 3 with stable kidney function. Prograf is high but likely not a true trough. Repeat level this Friday    HTN is controlled    Mineral and Bone disease--may need Sensipar. Add calcitriol with PTH in one month. Continue exogenous bicarbonate    Mild anemia--B12 and folate are fine. Ferritin >1000 so no additional iron    Screen for DM is negative    History of facial basal cell--Derm follow up.       RTC 4 months with labs prior

## 2018-04-16 NOTE — PATIENT INSTRUCTIONS
1. Kidney function is great  2. All electrolytes are normal  3. Urine protein is normal  4. Prograf is high--will recheck Friday morning at 7am (take Prograf at 7pm Thursday)   5. Iron, B12 and folate levels are fine  6. BP is great  7. Watch phosphorous for next time  8. Screen for Diabetes is NORMAL

## 2018-04-19 ENCOUNTER — DOCUMENTATION ONLY (OUTPATIENT)
Dept: FAMILY MEDICINE | Facility: CLINIC | Age: 68
End: 2018-04-19

## 2018-04-19 NOTE — PROGRESS NOTES
Pre-Visit Chart Review  For Appointment Scheduled on 4-20-18    Health Maintenance Due   Topic Date Due    TETANUS VACCINE  01/21/1969    Zoster Vaccine  01/21/2011    Pneumococcal (65+) (1 of 2 - PCV13) 01/21/2016    Influenza Vaccine  08/01/2017

## 2018-04-20 ENCOUNTER — OFFICE VISIT (OUTPATIENT)
Dept: FAMILY MEDICINE | Facility: CLINIC | Age: 68
End: 2018-04-20
Payer: MEDICARE

## 2018-04-20 ENCOUNTER — LAB VISIT (OUTPATIENT)
Dept: LAB | Facility: HOSPITAL | Age: 68
End: 2018-04-20
Attending: INTERNAL MEDICINE
Payer: MEDICARE

## 2018-04-20 VITALS
BODY MASS INDEX: 20.87 KG/M2 | DIASTOLIC BLOOD PRESSURE: 61 MMHG | HEART RATE: 85 BPM | SYSTOLIC BLOOD PRESSURE: 127 MMHG | WEIGHT: 149.06 LBS | TEMPERATURE: 98 F | HEIGHT: 71 IN

## 2018-04-20 DIAGNOSIS — T86.11 ACUTE REJECTION OF KIDNEY TRANSPLANT: ICD-10-CM

## 2018-04-20 DIAGNOSIS — N18.30 STAGE 3 CHRONIC KIDNEY DISEASE: Chronic | ICD-10-CM

## 2018-04-20 DIAGNOSIS — Z29.89 PROPHYLACTIC IMMUNOTHERAPY: ICD-10-CM

## 2018-04-20 DIAGNOSIS — Z79.60 LONG-TERM USE OF IMMUNOSUPPRESSANT MEDICATION: ICD-10-CM

## 2018-04-20 DIAGNOSIS — Z94.0 KIDNEY TRANSPLANT RECIPIENT: ICD-10-CM

## 2018-04-20 DIAGNOSIS — E03.9 HYPOTHYROIDISM, UNSPECIFIED TYPE: ICD-10-CM

## 2018-04-20 DIAGNOSIS — I10 ESSENTIAL HYPERTENSION: Primary | ICD-10-CM

## 2018-04-20 PROCEDURE — 36415 COLL VENOUS BLD VENIPUNCTURE: CPT | Mod: PO

## 2018-04-20 PROCEDURE — 3074F SYST BP LT 130 MM HG: CPT | Mod: S$GLB,,, | Performed by: FAMILY MEDICINE

## 2018-04-20 PROCEDURE — 99999 PR PBB SHADOW E&M-EST. PATIENT-LVL III: CPT | Mod: PBBFAC,,, | Performed by: FAMILY MEDICINE

## 2018-04-20 PROCEDURE — 99214 OFFICE O/P EST MOD 30 MIN: CPT | Mod: S$GLB,,, | Performed by: FAMILY MEDICINE

## 2018-04-20 PROCEDURE — 80197 ASSAY OF TACROLIMUS: CPT

## 2018-04-20 PROCEDURE — 3078F DIAST BP <80 MM HG: CPT | Mod: S$GLB,,, | Performed by: FAMILY MEDICINE

## 2018-04-20 NOTE — PROGRESS NOTES
Subjective:       Patient ID: Alin Burkett is a 68 y.o. male.    Chief Complaint: Establish Care    Patient Active Problem List   Diagnosis    Adrenal adenoma    Calcium nephrolithiasis    Ectopic kidney    Essential hypertension    Congenital absence of kidney    Anemia of chronic disease    Basal cell carcinoma - of the nose    History of AAA (abdominal aortic aneurysm) repair,     Secondary hyperparathyroidism, renal    Metabolic acidosis    Hypokalemia    Chronic diarrhea    Hypothyroidism    Plantar warts    Left lateral abdominal pain    Arrhythmia, onset     History of smoking 10-25 pack years, quit , 20 pack-years    History of alcohol abuse, quit     Abnormal ECG    VPC (ventricular premature complex)    Body mass index (BMI) of 20.0-20.9 in adult    LOUISA (obstructive sleep apnea), CPAP refused    LVH (left ventricular hypertrophy) due to hypertensive disease    Chest pain    Elevated troponin I level    BPH (benign prostatic hyperplasia)    Prophylactic immunotherapy (transplant immunosuppression)    Long-term use of immunosuppressant medication    -donor kidney transplant 16    Acute vascular rejection of kidney transplant 17    S/P kidney transplant    Hypophosphatemia    Complication of kidney transplant    Stage 3 chronic kidney disease    Urethral stricture    Recurrent UTI    Acquired bladder diverticulum     HPI  Review of Systems   Constitutional: Negative for activity change and unexpected weight change.   HENT: Negative for hearing loss, rhinorrhea and trouble swallowing.    Eyes: Negative for discharge and visual disturbance.   Respiratory: Negative for chest tightness and wheezing.    Cardiovascular: Negative for chest pain and palpitations.   Gastrointestinal: Negative for blood in stool, constipation, diarrhea and vomiting.   Endocrine: Negative for polydipsia and polyuria.   Genitourinary: Negative for difficulty  urinating, hematuria and urgency.   Musculoskeletal: Negative for arthralgias, joint swelling and neck pain.   Neurological: Negative for weakness and headaches.   Psychiatric/Behavioral: Negative for confusion and dysphoric mood.       Objective:      Physical Exam   Constitutional: He is oriented to person, place, and time. He appears well-developed and well-nourished.   Cardiovascular: Normal rate, regular rhythm and normal heart sounds.    Pulmonary/Chest: Effort normal and breath sounds normal.   Musculoskeletal: He exhibits no edema.   Neurological: He is alert and oriented to person, place, and time.   Skin: Skin is warm and dry.   Psychiatric: He has a normal mood and affect.   Nursing note and vitals reviewed.      Assessment:       1. Essential hypertension    2. Long-term use of immunosuppressant medication    3. Prophylactic immunotherapy (transplant immunosuppression)    4. Stage 3 chronic kidney disease    5. Acute vascular rejection of kidney transplant 1/6/17    6. Hypothyroidism, unspecified type        Plan:       1. Essential hypertension  Controlled on current medications.  Continue current medications.      2. Long-term use of immunosuppressant medication  Cont current meds    3. Prophylactic immunotherapy (transplant immunosuppression)  Cont transplant team care and monitoring    4. Stage 3 chronic kidney disease  Stable and chronic.  Will continue to monitor q3-6 months and control chronic conditions as optimally as possible to preserve function.      5. Acute vascular rejection of kidney transplant 1/6/17  Cont transplant team monitoring and care    6. Hypothyroidism, unspecified type  Stable condition.  Continue current medications.  Will adjust based on lab findings or if condition changes.    Reeval 6 months

## 2018-04-21 LAB — TACROLIMUS BLD-MCNC: 6.3 NG/ML

## 2018-04-23 ENCOUNTER — PATIENT MESSAGE (OUTPATIENT)
Dept: NEPHROLOGY | Facility: CLINIC | Age: 68
End: 2018-04-23

## 2018-05-08 ENCOUNTER — LAB VISIT (OUTPATIENT)
Dept: LAB | Facility: HOSPITAL | Age: 68
End: 2018-05-08
Attending: FAMILY MEDICINE
Payer: MEDICARE

## 2018-05-08 DIAGNOSIS — Z94.0 KIDNEY REPLACED BY TRANSPLANT: ICD-10-CM

## 2018-05-08 LAB
ALBUMIN SERPL BCP-MCNC: 3.6 G/DL
ALBUMIN SERPL BCP-MCNC: 3.6 G/DL
ALP SERPL-CCNC: 63 U/L
ALT SERPL W/O P-5'-P-CCNC: 35 U/L
ANION GAP SERPL CALC-SCNC: 10 MMOL/L
AST SERPL-CCNC: 27 U/L
BASOPHILS # BLD AUTO: 0.02 K/UL
BASOPHILS NFR BLD: 0.4 %
BILIRUB DIRECT SERPL-MCNC: 0.2 MG/DL
BILIRUB SERPL-MCNC: 0.4 MG/DL
BUN SERPL-MCNC: 20 MG/DL
CALCIUM SERPL-MCNC: 9.1 MG/DL
CHLORIDE SERPL-SCNC: 110 MMOL/L
CO2 SERPL-SCNC: 24 MMOL/L
CREAT SERPL-MCNC: 1.5 MG/DL
DIFFERENTIAL METHOD: ABNORMAL
EOSINOPHIL # BLD AUTO: 0.1 K/UL
EOSINOPHIL NFR BLD: 1.9 %
ERYTHROCYTE [DISTWIDTH] IN BLOOD BY AUTOMATED COUNT: 13.3 %
EST. GFR  (AFRICAN AMERICAN): 54.9 ML/MIN/1.73 M^2
EST. GFR  (NON AFRICAN AMERICAN): 47.5 ML/MIN/1.73 M^2
GLUCOSE SERPL-MCNC: 87 MG/DL
HCT VFR BLD AUTO: 40.3 %
HGB BLD-MCNC: 12.7 G/DL
IMM GRANULOCYTES # BLD AUTO: 0.03 K/UL
IMM GRANULOCYTES NFR BLD AUTO: 0.6 %
LYMPHOCYTES # BLD AUTO: 0.8 K/UL
LYMPHOCYTES NFR BLD: 16.2 %
MAGNESIUM SERPL-MCNC: 1.8 MG/DL
MCH RBC QN AUTO: 30.7 PG
MCHC RBC AUTO-ENTMCNC: 31.5 G/DL
MCV RBC AUTO: 97 FL
MONOCYTES # BLD AUTO: 0.4 K/UL
MONOCYTES NFR BLD: 9.3 %
NEUTROPHILS # BLD AUTO: 3.3 K/UL
NEUTROPHILS NFR BLD: 71.6 %
NRBC BLD-RTO: 0 /100 WBC
PHOSPHATE SERPL-MCNC: 4.1 MG/DL
PLATELET # BLD AUTO: 145 K/UL
PMV BLD AUTO: 11.6 FL
POTASSIUM SERPL-SCNC: 3.3 MMOL/L
PROT SERPL-MCNC: 6.7 G/DL
RBC # BLD AUTO: 4.14 M/UL
SODIUM SERPL-SCNC: 144 MMOL/L
WBC # BLD AUTO: 4.64 K/UL

## 2018-05-08 PROCEDURE — 84075 ASSAY ALKALINE PHOSPHATASE: CPT

## 2018-05-08 PROCEDURE — 80069 RENAL FUNCTION PANEL: CPT

## 2018-05-08 PROCEDURE — 80197 ASSAY OF TACROLIMUS: CPT

## 2018-05-08 PROCEDURE — 85025 COMPLETE CBC W/AUTO DIFF WBC: CPT

## 2018-05-08 PROCEDURE — 83735 ASSAY OF MAGNESIUM: CPT

## 2018-05-08 PROCEDURE — 82247 BILIRUBIN TOTAL: CPT

## 2018-05-08 PROCEDURE — 87799 DETECT AGENT NOS DNA QUANT: CPT

## 2018-05-09 ENCOUNTER — PATIENT MESSAGE (OUTPATIENT)
Dept: TRANSPLANT | Facility: CLINIC | Age: 68
End: 2018-05-09

## 2018-05-09 LAB — TACROLIMUS BLD-MCNC: 10.1 NG/ML

## 2018-05-10 LAB
BKV DNA SERPL NAA+PROBE-ACNC: <125 COPIES/ML
BKV DNA SERPL NAA+PROBE-LOG#: <2.1 LOG (10) COPIES/ML
BKV DNA SERPL QL NAA+PROBE: NOT DETECTED

## 2018-05-10 NOTE — TELEPHONE ENCOUNTER
----- Message from Chasidy Mcclain MD sent at 5/9/2018  9:56 AM CDT -----  Please decrease tac to 3 mg BID if it is a real trough and check level in 3 weeks. Thank you!

## 2018-05-10 NOTE — TELEPHONE ENCOUNTER
Notified patient of Dr. Mcclain's review of 5/8/18 lab results via [facility]. Instructions given for patient to decrease Prograf to 3mg twice daily; repeat labs on 5/30/18.

## 2018-05-11 RX ORDER — TACROLIMUS 1 MG/1
3 CAPSULE ORAL EVERY 12 HOURS
Qty: 180 CAPSULE | Refills: 11 | Status: SHIPPED | OUTPATIENT
Start: 2018-05-11 | End: 2018-05-31 | Stop reason: SDUPTHER

## 2018-05-14 ENCOUNTER — OFFICE VISIT (OUTPATIENT)
Dept: TRANSPLANT | Facility: CLINIC | Age: 68
End: 2018-05-14
Payer: MEDICARE

## 2018-05-14 ENCOUNTER — LAB VISIT (OUTPATIENT)
Dept: LAB | Facility: HOSPITAL | Age: 68
End: 2018-05-14
Attending: INTERNAL MEDICINE
Payer: MEDICARE

## 2018-05-14 VITALS
HEIGHT: 71 IN | TEMPERATURE: 99 F | OXYGEN SATURATION: 99 % | RESPIRATION RATE: 17 BRPM | DIASTOLIC BLOOD PRESSURE: 84 MMHG | BODY MASS INDEX: 20.8 KG/M2 | SYSTOLIC BLOOD PRESSURE: 115 MMHG | WEIGHT: 148.56 LBS | HEART RATE: 50 BPM

## 2018-05-14 DIAGNOSIS — E03.9 HYPOTHYROIDISM, UNSPECIFIED TYPE: ICD-10-CM

## 2018-05-14 DIAGNOSIS — Z79.60 LONG-TERM USE OF IMMUNOSUPPRESSANT MEDICATION: Primary | ICD-10-CM

## 2018-05-14 DIAGNOSIS — N25.81 SECONDARY HYPERPARATHYROIDISM, RENAL: ICD-10-CM

## 2018-05-14 DIAGNOSIS — R30.0 BURNING WITH URINATION: Primary | ICD-10-CM

## 2018-05-14 DIAGNOSIS — R30.0 BURNING WITH URINATION: ICD-10-CM

## 2018-05-14 DIAGNOSIS — I10 ESSENTIAL HYPERTENSION: ICD-10-CM

## 2018-05-14 DIAGNOSIS — Z94.0 S/P KIDNEY TRANSPLANT: ICD-10-CM

## 2018-05-14 DIAGNOSIS — N39.0 RECURRENT UTI: ICD-10-CM

## 2018-05-14 PROBLEM — T86.11 ACUTE REJECTION OF KIDNEY TRANSPLANT: Status: RESOLVED | Noted: 2017-01-09 | Resolved: 2018-05-14

## 2018-05-14 LAB
BACTERIA #/AREA URNS AUTO: ABNORMAL /HPF
BILIRUB UR QL STRIP: NEGATIVE
CLARITY UR REFRACT.AUTO: ABNORMAL
COLOR UR AUTO: YELLOW
GLUCOSE UR QL STRIP: NEGATIVE
HGB UR QL STRIP: ABNORMAL
KETONES UR QL STRIP: NEGATIVE
LEUKOCYTE ESTERASE UR QL STRIP: ABNORMAL
MICROSCOPIC COMMENT: ABNORMAL
NITRITE UR QL STRIP: POSITIVE
PH UR STRIP: 6 [PH] (ref 5–8)
PROT UR QL STRIP: NEGATIVE
RBC #/AREA URNS AUTO: 3 /HPF (ref 0–4)
SP GR UR STRIP: 1.01 (ref 1–1.03)
URN SPEC COLLECT METH UR: ABNORMAL
UROBILINOGEN UR STRIP-ACNC: NEGATIVE EU/DL
WBC #/AREA URNS AUTO: >100 /HPF (ref 0–5)
WBC CLUMPS UR QL AUTO: ABNORMAL

## 2018-05-14 PROCEDURE — 87086 URINE CULTURE/COLONY COUNT: CPT

## 2018-05-14 PROCEDURE — 3074F SYST BP LT 130 MM HG: CPT | Mod: S$GLB,,, | Performed by: INTERNAL MEDICINE

## 2018-05-14 PROCEDURE — 87186 SC STD MICRODIL/AGAR DIL: CPT

## 2018-05-14 PROCEDURE — 3079F DIAST BP 80-89 MM HG: CPT | Mod: S$GLB,,, | Performed by: INTERNAL MEDICINE

## 2018-05-14 PROCEDURE — 81001 URINALYSIS AUTO W/SCOPE: CPT

## 2018-05-14 PROCEDURE — 87088 URINE BACTERIA CULTURE: CPT

## 2018-05-14 PROCEDURE — 99215 OFFICE O/P EST HI 40 MIN: CPT | Mod: S$GLB,,, | Performed by: INTERNAL MEDICINE

## 2018-05-14 PROCEDURE — 87077 CULTURE AEROBIC IDENTIFY: CPT

## 2018-05-14 PROCEDURE — 99999 PR PBB SHADOW E&M-EST. PATIENT-LVL IV: CPT | Mod: PBBFAC,,, | Performed by: INTERNAL MEDICINE

## 2018-05-14 RX ORDER — CINACALCET 60 MG/1
60 TABLET, FILM COATED ORAL
Qty: 30 TABLET | Refills: 5 | Status: SHIPPED | OUTPATIENT
Start: 2018-05-14 | End: 2018-11-07

## 2018-05-14 RX ORDER — ERGOCALCIFEROL 1.25 MG/1
50000 CAPSULE ORAL
Qty: 4 CAPSULE | Refills: 6 | Status: SHIPPED | OUTPATIENT
Start: 2018-05-14 | End: 2018-09-04

## 2018-05-14 NOTE — PATIENT INSTRUCTIONS
Thank you for entrusting your transplant care to us, and visiting me today. Please:    - blood work May 30th, 2018  - Ergocalciferol 58412 units q weekly  - Start sensipar 60 mg daily  - Feel free to call us with any concerns regarding your health care.  - Monitor your blood pressure and aim to keep < 140/90  - See your nephrologist

## 2018-05-14 NOTE — LETTER
May 14, 2018                     [address]  [address]  [facility] [address]  Phone: [phone]   Patient: lAin Burkett   MR Number: [id number]   YOB: 1950   Date of Visit: 5/14/2018       Dear      Thank you for referring Alin Burkett to me for evaluation. Attached you will find relevant portions of my assessment and plan of care.    If you have questions, please do not hesitate to call me. I look forward to following Alin Burkett along with you.    Sincerely,    Chasidy Mcclain MD    Enclosure    If you would like to receive this communication electronically, please contact [email or web address] or [phone] to request [employer] Link access.    [employer] Link is a tool which provides read-only access to select patient information with whom you have a relationship. Its easy to use and provides real time access to review your patients record including encounter summaries, notes, results, and demographic information.    If you feel you have received this communication in error or would no longer like to receive these types of communications, please e-mail [email or web address]

## 2018-05-14 NOTE — PROGRESS NOTES
Kidney Post-Transplant Assessment    Referring Physician: Lake Merchant  Current Nephrologist:     ORGAN: LEFT KIDNEY  Donor Type:  - brain death   PHS Increased Risk: yes  Cold Ischemia: 854 mins  Induction Medications: campath - alemtuzumab (anti-cd52)      Subjective:     CC:  Reassessment of renal allograft function and management of chronic immunosuppression.    Kidney History:  Mr. Burkett is a 68 y.o. 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 Mr. Burkett had  ESRD HD due to HTN  and congenital absent left kidney who was on dialysis since 6/16/10 until receiving PHS increased risk DDRT (pumped kidney, Campath induction, KDPI 36%, WIT 30 minutes, CIT 14 hours and 14 minutes, donor RPR positive thus patient completed PCN x3, CMV D+/R+) on 16. His most recent creatinine is 1.4. He takes mycophenolate mofetil, prednisone and tacrolimus  For Immunosuppression.       Post Transplant Course:   - Pt had DIVINE with suspected rejection on 17.  Kid bx  revealed AVR  Had thymo x 5-7 doses. DSA neg.  -  - He was rebiopsied on 17 which showed acute tubular injury and vacuolization, with + myoglobulin and negative heme.  - Recurrent UTI  - chronic diarrhea: resolved     Interval History: He presents to the clinic by himself.  He feels well, but he complains of burning sensation while urinating.  he denies any chest pain, shortness of breath, ENT symptoms, nausea/vomiting, or pain over the allograft. His home BPs are acceptable. He follows urology for his recurrent UTI.  Patient is not familiar with his meds and states that his wife takes care of his meds.       Medications:   Current Outpatient Prescriptions   Medication Sig Dispense Refill    famotidine (PEPCID) 20 MG tablet Take 1 tablet (20 mg total) by mouth every evening. 90 tablet 3    k phos di & mono-sod phos mono (K-PHOS-NEUTRAL) 250 mg Tab Take 4 tablets by mouth 4  (four) times daily. 480 tablet 11    ketoconazole (NIZORAL) 200 mg Tab Take 0.5 tablets (100 mg total) by mouth once daily. 45 tablet 3    levothyroxine (SYNTHROID) 100 MCG tablet Take 1 tablet (100 mcg total) by mouth once daily. 90 tablet 3    magnesium oxide (MAG-OX) 400 mg tablet Take 1 tablet (400 mg total) by mouth once daily. 90 tablet 3    metoprolol tartrate (LOPRESSOR) 25 MG tablet Take 0.5 tablets (12.5 mg total) by mouth 2 (two) times daily. 90 tablet 3    multivitamin (ONE DAILY MULTIVITAMIN) per tablet Take 1 tablet by mouth once daily.      mycophenolate (CELLCEPT) 250 mg Cap Take 3 capsules (750 mg total) by mouth 2 (two) times daily. Z94.0/Kidney Transplant on 11/26/16 540 capsule 3    predniSONE (DELTASONE) 5 MG tablet Take 1 tablet (5 mg total) by mouth once daily. Z94.0/Kidney transplant on 11/26/2016 90 tablet 3    sodium bicarbonate 650 MG tablet Take 3 tablets (1,950 mg total) by mouth 2 (two) times daily. 540 tablet 3    tacrolimus (PROGRAF) 1 MG Cap Take 3 capsules (3 mg total) by mouth every 12 (twelve) hours 180 capsule 11    cinacalcet (SENSIPAR) 60 MG Tab Take 1 tablet (60 mg total) by mouth daily with breakfast. 30 tablet 5    ergocalciferol (ERGOCALCIFEROL) 50,000 unit Cap Take 1 capsule (50,000 Units total) by mouth every 7 days. 4 capsule 6     No current facility-administered medications for this visit.            Review of Systems     Constitutional: Negative for fever, appetite change and fatigue.    Respiratory: Negative for cough, chest tightness, shortness of breath and wheezing.   Cardiovascular: Negative for chest pain, palpitations and leg swelling.   Gastrointestinal: Negative for nausea, vomiting, abdominal pain, constipation, blood in stool and abdominal distention.   Genitourinary: Negative for urgency, frequency, hematuria, decreased urine volume, difficulty urinating.  Hematological: Negative for adenopathy. Does not bruise/bleed easily.  "  Psychiatric/Behavioral: Negative for confusion, sleep disturbance and dysphoric mood. The patient is not nervous/anxious.        Objective:     Blood pressure 115/84, pulse (!) 50, temperature 98.9 °F (37.2 °C), temperature source Oral, resp. rate 17, height 5' 11" (1.803 m), weight 67.4 kg (148 lb 9.4 oz), SpO2 99 %.  body mass index is 20.72 kg/m².    Physical Exam     Respiratory: Clear to auscultation bilaterally, respirations unlabored, no rales/rhonchi/wheezing  Cardiovacular: Regular rate and rhythm, S1, S2 normal, no murmurs, rubs or gallops, no carotid bruit  Gastrointestinal: Soft, non-tender, bowel sounds normal, no hepatosplenomegaly  Renal allograft exam: No tenderness, no bruits, normal exam  Musculoskeletal: No knee or ankle joint tenderness or swelling.   Extremities: No clubbing or cyanosis of bilateral upper extremities; no lower extremity edema bilaterally  Skin: warm and dry; no rash on exposed skin  Neurologic: CN grossly intact, alert and oriented x 3    Labs:  Lab Results   Component Value Date    WBC 4.64 05/08/2018    HGB 12.7 (L) 05/08/2018    HCT 40.3 05/08/2018     05/08/2018    K 3.3 (L) 05/08/2018     05/08/2018    CO2 24 05/08/2018    BUN 20 05/08/2018    CREATININE 1.5 (H) 05/08/2018    EGFRNONAA 47.5 (A) 05/08/2018    CALCIUM 9.1 05/08/2018    PHOS 4.1 05/08/2018    MG 1.8 05/08/2018    ALBUMIN 3.6 05/08/2018    ALBUMIN 3.6 05/08/2018    AST 27 05/08/2018    ALT 35 05/08/2018    UTPCR 0.15 05/08/2018    .0 (H) 03/28/2018    TACROLIMUS 10.1 05/08/2018    CYCLOSPORINE <10 (L) 01/10/2017       No results found for: EXTANC, EXTWBC, EXTSEGS, EXTPLATELETS, EXTHEMOGLOBI, EXTHEMATOCRI, EXTCREATININ, EXTSODIUM, EXTPOTASSIUM, EXTBUN, EXTCO2, EXTCALCIUM, EXTPHOSPHORU, EXTGLUCOSE, EXTALBUMIN, EXTAST, EXTALT, EXTBILITOTAL, EXTLIPASE, EXTAMYLASE    No results found for: EXTCYCLOSLVL, EXTSIROLIMUS, EXTTACROLVL, EXTPROTCRE, EXTPTHINTACT, EXTPROTEINUA, EXTWBCUA, EXTRBCUA    Labs " were reviewed with the patient.    Assessment/Plan:     1. Long-term use of immunosuppressant medication    2. Hypothyroidism, unspecified type    3. Body mass index (BMI) of 20.0-20.9 in adult    4. Secondary hyperparathyroidism, renal    5. Recurrent UTI    6. S/P kidney transplant    7. Essential hypertension        Mr. Burkett is a 68 y.o. male with:       # History of ESRD presumed secondary to HTN and congenital solitary kidney  - s/p DDRT on 11/26/16  - Kid bx 1/6/17 revealed AVR. Had thymo x 5 doses. DSA neg.    - Kidney rebiopsy on 1/23/17 showed acute tubular injury and vacuolization, with + myoglobulin and negative heme.  - last Cr 1.5  - non significant proteinuria    # Immunosuppression:   - continue Prograf at the current dose  - continue  mg BID   - continue prednisone      # History of recurrent UTI  - has history of urethral stricture in the past  - follow with his urologist  - UA and urine culture as patient is symptomatic today     # Infectious Surveillance:   - last CMV : negative  - last BK PCR : negative    # HTN:   - BPs well controlled   - continue with home blood pressure monitoring  - low salt and healthy life discussed with the patient     # Metabolic Bone Disease/Secondary Hyperparathyroidism:  - High PTH and low vitamin D   - stable Ca, PO4 and Mg  - will start ergocalciferol 93210 units weekly and sensipar 60 mg daily       # Anemia of chronic disease:   - Hb stable  - will continue monitoring as per our center guidelines.       # Hyperuricemia   - on low purine diet  - will monitor uric acid        # Erection dysfunction  - urology follow up              Plan:  - Start sensipar and ergocalciferol  - UA and urine culture today   - Education by pharmacy regarding hs med list

## 2018-05-14 NOTE — Clinical Note
Clinic plan:  - Sensipar 60 mg daily - Ergocalciferol 77889 units weekly for 6 months - D/C bactrim - Dermatology consult

## 2018-05-15 ENCOUNTER — PATIENT MESSAGE (OUTPATIENT)
Dept: TRANSPLANT | Facility: CLINIC | Age: 68
End: 2018-05-15

## 2018-05-15 RX ORDER — CIPROFLOXACIN 500 MG/1
500 TABLET ORAL DAILY
Qty: 7 TABLET | Refills: 0 | Status: SHIPPED | OUTPATIENT
Start: 2018-05-15 | End: 2018-05-22

## 2018-05-15 NOTE — TELEPHONE ENCOUNTER
----- Message from Chasidy Mcclain MD sent at 5/14/2018  7:31 PM CDT -----  Please start ciprofloxacin 500 mg daily x 7 days. Thank you

## 2018-05-16 LAB — BACTERIA UR CULT: NORMAL

## 2018-05-30 ENCOUNTER — LAB VISIT (OUTPATIENT)
Dept: LAB | Facility: HOSPITAL | Age: 68
End: 2018-05-30
Attending: INTERNAL MEDICINE
Payer: MEDICARE

## 2018-05-30 DIAGNOSIS — Z94.0 KIDNEY REPLACED BY TRANSPLANT: ICD-10-CM

## 2018-05-30 PROCEDURE — 36415 COLL VENOUS BLD VENIPUNCTURE: CPT | Mod: PO

## 2018-05-30 PROCEDURE — 80197 ASSAY OF TACROLIMUS: CPT

## 2018-05-31 LAB — TACROLIMUS BLD-MCNC: 10.1 NG/ML

## 2018-05-31 RX ORDER — TACROLIMUS 1 MG/1
CAPSULE ORAL
Qty: 150 CAPSULE | Refills: 11 | Status: SHIPPED | OUTPATIENT
Start: 2018-05-31 | End: 2019-03-04 | Stop reason: SDUPTHER

## 2018-05-31 NOTE — TELEPHONE ENCOUNTER
----- Message from Chasidy Mcclain MD sent at 5/31/2018 10:26 AM CDT -----  Please decrease prograf to 3/2 mg and check the level in 2-3 weeks. Thank you!

## 2018-06-06 RX ORDER — LEVOTHYROXINE SODIUM 100 UG/1
100 TABLET ORAL DAILY
Qty: 90 TABLET | Refills: 3 | Status: SHIPPED | OUTPATIENT
Start: 2018-06-06 | End: 2019-03-04 | Stop reason: SDUPTHER

## 2018-06-11 ENCOUNTER — LAB VISIT (OUTPATIENT)
Dept: LAB | Facility: HOSPITAL | Age: 68
End: 2018-06-11
Attending: INTERNAL MEDICINE
Payer: MEDICARE

## 2018-06-11 DIAGNOSIS — Z94.0 KIDNEY REPLACED BY TRANSPLANT: ICD-10-CM

## 2018-06-11 PROCEDURE — 80197 ASSAY OF TACROLIMUS: CPT

## 2018-06-11 PROCEDURE — 36415 COLL VENOUS BLD VENIPUNCTURE: CPT | Mod: PO

## 2018-06-12 LAB — TACROLIMUS BLD-MCNC: 5.2 NG/ML

## 2018-07-03 RX ORDER — SOD PHOS DI, MONO/K PHOS MONO 250 MG
TABLET ORAL
Qty: 480 TABLET | Refills: 11 | OUTPATIENT
Start: 2018-07-03

## 2018-07-07 RX ORDER — SOD PHOS DI, MONO/K PHOS MONO 250 MG
TABLET ORAL
Qty: 480 TABLET | Refills: 11 | Status: SHIPPED | OUTPATIENT
Start: 2018-07-07 | End: 2018-11-12 | Stop reason: ALTCHOICE

## 2018-08-13 ENCOUNTER — LAB VISIT (OUTPATIENT)
Dept: LAB | Facility: HOSPITAL | Age: 68
End: 2018-08-13
Attending: INTERNAL MEDICINE
Payer: MEDICARE

## 2018-08-13 DIAGNOSIS — Z94.0 KIDNEY TRANSPLANT RECIPIENT: ICD-10-CM

## 2018-08-13 LAB
ALBUMIN SERPL BCP-MCNC: 3.5 G/DL
ALBUMIN SERPL BCP-MCNC: 3.5 G/DL
ALP SERPL-CCNC: 60 U/L
ALT SERPL W/O P-5'-P-CCNC: 31 U/L
ANION GAP SERPL CALC-SCNC: 10 MMOL/L
AST SERPL-CCNC: 24 U/L
BASOPHILS # BLD AUTO: 0.02 K/UL
BASOPHILS NFR BLD: 0.4 %
BILIRUB DIRECT SERPL-MCNC: 0.3 MG/DL
BILIRUB SERPL-MCNC: 0.6 MG/DL
BUN SERPL-MCNC: 23 MG/DL
CALCIUM SERPL-MCNC: 8 MG/DL
CHLORIDE SERPL-SCNC: 106 MMOL/L
CO2 SERPL-SCNC: 29 MMOL/L
CREAT SERPL-MCNC: 1.4 MG/DL
DIFFERENTIAL METHOD: ABNORMAL
EOSINOPHIL # BLD AUTO: 0.1 K/UL
EOSINOPHIL NFR BLD: 2 %
ERYTHROCYTE [DISTWIDTH] IN BLOOD BY AUTOMATED COUNT: 13.9 %
EST. GFR  (AFRICAN AMERICAN): 59.7 ML/MIN/1.73 M^2
EST. GFR  (NON AFRICAN AMERICAN): 51.6 ML/MIN/1.73 M^2
GLUCOSE SERPL-MCNC: 62 MG/DL
HCT VFR BLD AUTO: 40.4 %
HGB BLD-MCNC: 12.9 G/DL
IMM GRANULOCYTES # BLD AUTO: 0.03 K/UL
IMM GRANULOCYTES NFR BLD AUTO: 0.6 %
LYMPHOCYTES # BLD AUTO: 0.7 K/UL
LYMPHOCYTES NFR BLD: 14.1 %
MAGNESIUM SERPL-MCNC: 1.6 MG/DL
MCH RBC QN AUTO: 31 PG
MCHC RBC AUTO-ENTMCNC: 31.9 G/DL
MCV RBC AUTO: 97 FL
MONOCYTES # BLD AUTO: 0.5 K/UL
MONOCYTES NFR BLD: 9.7 %
NEUTROPHILS # BLD AUTO: 3.7 K/UL
NEUTROPHILS NFR BLD: 73.2 %
NRBC BLD-RTO: 0 /100 WBC
PHOSPHATE SERPL-MCNC: 3.2 MG/DL
PLATELET # BLD AUTO: 131 K/UL
PMV BLD AUTO: 11.6 FL
POTASSIUM SERPL-SCNC: 3 MMOL/L
PROT SERPL-MCNC: 6.5 G/DL
PTH-INTACT SERPL-MCNC: 324 PG/ML
RBC # BLD AUTO: 4.16 M/UL
SODIUM SERPL-SCNC: 145 MMOL/L
WBC # BLD AUTO: 5.04 K/UL

## 2018-08-13 PROCEDURE — 80197 ASSAY OF TACROLIMUS: CPT

## 2018-08-13 PROCEDURE — 83735 ASSAY OF MAGNESIUM: CPT

## 2018-08-13 PROCEDURE — 83970 ASSAY OF PARATHORMONE: CPT

## 2018-08-13 PROCEDURE — 82247 BILIRUBIN TOTAL: CPT

## 2018-08-13 PROCEDURE — 85025 COMPLETE CBC W/AUTO DIFF WBC: CPT

## 2018-08-13 PROCEDURE — 84155 ASSAY OF PROTEIN SERUM: CPT

## 2018-08-13 PROCEDURE — 36415 COLL VENOUS BLD VENIPUNCTURE: CPT | Mod: PO

## 2018-08-13 PROCEDURE — 80069 RENAL FUNCTION PANEL: CPT

## 2018-08-14 LAB — TACROLIMUS BLD-MCNC: 6.4 NG/ML

## 2018-08-24 ENCOUNTER — TELEPHONE (OUTPATIENT)
Dept: NEPHROLOGY | Facility: CLINIC | Age: 68
End: 2018-08-24

## 2018-08-24 NOTE — TELEPHONE ENCOUNTER
He needs to get a tetanus. He can go to his PCP or a [facility].     Keep it cleaned BID with antibacterial soap. Any drainage or increased redness to Urgent Care over the weekend.

## 2018-08-24 NOTE — TELEPHONE ENCOUNTER
"Patient fell and scraped arm  "pretty bad" on an old metal bench.  No Tetanus vaccine since [place].  He cleaned it good and put neosporin and a bandage on it.    Please advise re: treatment or vaccines needed.     "

## 2018-09-04 ENCOUNTER — OFFICE VISIT (OUTPATIENT)
Dept: NEPHROLOGY | Facility: CLINIC | Age: 68
End: 2018-09-04
Payer: MEDICARE

## 2018-09-04 ENCOUNTER — LAB VISIT (OUTPATIENT)
Dept: LAB | Facility: HOSPITAL | Age: 68
End: 2018-09-04
Attending: INTERNAL MEDICINE
Payer: MEDICARE

## 2018-09-04 VITALS
OXYGEN SATURATION: 99 % | HEART RATE: 63 BPM | WEIGHT: 154.81 LBS | BODY MASS INDEX: 21.67 KG/M2 | DIASTOLIC BLOOD PRESSURE: 76 MMHG | HEIGHT: 71 IN | SYSTOLIC BLOOD PRESSURE: 124 MMHG

## 2018-09-04 DIAGNOSIS — N39.0 UTI (URINARY TRACT INFECTION), UNCOMPLICATED: ICD-10-CM

## 2018-09-04 DIAGNOSIS — N39.0 UTI (URINARY TRACT INFECTION), UNCOMPLICATED: Primary | ICD-10-CM

## 2018-09-04 DIAGNOSIS — E83.39 HYPOPHOSPHATEMIA: ICD-10-CM

## 2018-09-04 PROCEDURE — 99214 OFFICE O/P EST MOD 30 MIN: CPT | Mod: S$PBB,,, | Performed by: INTERNAL MEDICINE

## 2018-09-04 PROCEDURE — 3074F SYST BP LT 130 MM HG: CPT | Mod: ,,, | Performed by: INTERNAL MEDICINE

## 2018-09-04 PROCEDURE — 87186 SC STD MICRODIL/AGAR DIL: CPT

## 2018-09-04 PROCEDURE — 99213 OFFICE O/P EST LOW 20 MIN: CPT | Mod: PBBFAC,PO | Performed by: INTERNAL MEDICINE

## 2018-09-04 PROCEDURE — 87088 URINE BACTERIA CULTURE: CPT

## 2018-09-04 PROCEDURE — 87077 CULTURE AEROBIC IDENTIFY: CPT

## 2018-09-04 PROCEDURE — 1101F PT FALLS ASSESS-DOCD LE1/YR: CPT | Mod: ,,, | Performed by: INTERNAL MEDICINE

## 2018-09-04 PROCEDURE — 99999 PR PBB SHADOW E&M-EST. PATIENT-LVL III: CPT | Mod: PBBFAC,,, | Performed by: INTERNAL MEDICINE

## 2018-09-04 PROCEDURE — 3078F DIAST BP <80 MM HG: CPT | Mod: ,,, | Performed by: INTERNAL MEDICINE

## 2018-09-04 PROCEDURE — 87086 URINE CULTURE/COLONY COUNT: CPT

## 2018-09-04 RX ORDER — SODIUM BICARBONATE 650 MG/1
1950 TABLET ORAL 2 TIMES DAILY
Qty: 540 TABLET | Refills: 3 | Status: SHIPPED | OUTPATIENT
Start: 2018-09-04 | End: 2018-11-12

## 2018-09-05 ENCOUNTER — TELEPHONE (OUTPATIENT)
Dept: NEPHROLOGY | Facility: CLINIC | Age: 68
End: 2018-09-05

## 2018-09-05 DIAGNOSIS — I10 ESSENTIAL HYPERTENSION: Primary | ICD-10-CM

## 2018-09-05 NOTE — TELEPHONE ENCOUNTER
Pt had urcx done yesterday and was to have a RFP in 2 weeks but it was drawn yesterday as well. Please schedule another in 2 weeks so that I can check his phosphorous level after I changed meds yesterday, orders in. Thank you

## 2018-09-06 ENCOUNTER — TELEPHONE (OUTPATIENT)
Dept: NEPHROLOGY | Facility: CLINIC | Age: 68
End: 2018-09-06

## 2018-09-06 RX ORDER — AMPICILLIN 500 MG/1
500 CAPSULE ORAL 3 TIMES DAILY
Qty: 21 CAPSULE | Refills: 0 | Status: SHIPPED | OUTPATIENT
Start: 2018-09-06 | End: 2018-09-13

## 2018-09-07 ENCOUNTER — TELEPHONE (OUTPATIENT)
Dept: NEPHROLOGY | Facility: CLINIC | Age: 68
End: 2018-09-07

## 2018-09-07 LAB — BACTERIA UR CULT: NORMAL

## 2018-09-07 RX ORDER — DOXYCYCLINE 100 MG/1
100 CAPSULE ORAL 2 TIMES DAILY WITH MEALS
Qty: 14 CAPSULE | Refills: 0 | Status: SHIPPED | OUTPATIENT
Start: 2018-09-07 | End: 2018-09-14

## 2018-09-07 NOTE — TELEPHONE ENCOUNTER
The ID & S has returned and I need to prescribe Doxycycline. Cipro won't clear the UTI. Script sent to [facility]. Thank you

## 2018-09-07 NOTE — TELEPHONE ENCOUNTER
----- Message from Lilia Jane sent at 9/7/2018  2:22 PM CDT -----    Telma  From  [facility] pharmacy [phone]  For a script ampicillin 500 mg  Pharmacy is out pt requesting  cipro please call [phone] pt is in store waiting to see if  They  Can get  Cipro instead  // placed a call to pod

## 2018-09-19 ENCOUNTER — LAB VISIT (OUTPATIENT)
Dept: LAB | Facility: HOSPITAL | Age: 68
End: 2018-09-19
Attending: INTERNAL MEDICINE
Payer: MEDICARE

## 2018-09-19 DIAGNOSIS — I10 ESSENTIAL HYPERTENSION: ICD-10-CM

## 2018-09-19 LAB
ALBUMIN SERPL BCP-MCNC: 3.8 G/DL
ANION GAP SERPL CALC-SCNC: 9 MMOL/L
BUN SERPL-MCNC: 26 MG/DL
CALCIUM SERPL-MCNC: 8.7 MG/DL
CHLORIDE SERPL-SCNC: 109 MMOL/L
CO2 SERPL-SCNC: 25 MMOL/L
CREAT SERPL-MCNC: 1.8 MG/DL
EST. GFR  (AFRICAN AMERICAN): 44 ML/MIN/1.73 M^2
EST. GFR  (NON AFRICAN AMERICAN): 38.1 ML/MIN/1.73 M^2
GLUCOSE SERPL-MCNC: 77 MG/DL
PHOSPHATE SERPL-MCNC: 3.6 MG/DL
POTASSIUM SERPL-SCNC: 3.5 MMOL/L
SODIUM SERPL-SCNC: 143 MMOL/L

## 2018-09-19 PROCEDURE — 36415 COLL VENOUS BLD VENIPUNCTURE: CPT | Mod: PO

## 2018-09-19 PROCEDURE — 80069 RENAL FUNCTION PANEL: CPT

## 2018-09-23 NOTE — PROGRESS NOTES
Subjective:       Patient ID: Alin Burkett is a 68 y.o. Black or  male who presents for new evaluation of Chronic Kidney Disease and Kidney Transplant    HPI   Initial visit: 2018: He is referred by kidney transplant for CKD stage 3. He was previously followed by Dr. Merchant of nephrology but pt wishes to change nephrologists after he attempted to schedule an appointment to see him and was unsuccessful after four times. This caused delay in pt having secondary insurance as he needed a letter from Dr. Merchant.   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 He required Hemodialysis since  and received a  donor kidney in 2016 complicated by rejection. His baseline creatinine is around 1.5-1.6mg/dL. He denies allograft pain and no problems with urination but needs urethral dilatation intermittently, follows with Dr. Mann. No recent illness. He avoids NSAIDS and no herbal medications.He is compliant with medications and denies any side effects. BP is typically well controlled. No DM    Interval history: 2018: he reports he is doing well. He is to have a tooth pulled soon. No recent bronchitis nor illness. He escaped the flu thus far. He denies allograft pain and no problems with urination. No edema and no SOB. Appetite is good and weight is stable. He got 'off track' with Prograf q12 but has since resumed a better schedule.     Interval history: He's having trouble affording phos supplements. Good po intake, 2L at least, no recent illness. Has LUTS  But no allograft pain. No fever      Review of Systems   Constitutional: Negative for activity change, appetite change, fatigue, fever and unexpected weight change.   HENT: Negative for facial swelling.    Respiratory: Negative for cough and shortness of breath.    Cardiovascular: Negative for chest pain and leg swelling.   Gastrointestinal: Negative for abdominal pain, constipation and diarrhea.    Genitourinary: Positive for hematuria (when self caths). Negative for difficulty urinating, dysuria and frequency.   Musculoskeletal: Negative for arthralgias.   Skin: Negative for rash.   Neurological: Negative for tremors, weakness and headaches.   Hematological: Does not bruise/bleed easily.   Psychiatric/Behavioral: Negative for sleep disturbance.       Objective:      Physical Exam   Constitutional: He is oriented to person, place, and time. He appears well-developed and well-nourished. No distress.   Neck: No JVD present.   Cardiovascular: S1 normal and S2 normal. Exam reveals no friction rub.   Pulmonary/Chest: Breath sounds normal. He has no wheezes. He has no rales.   Abdominal: Soft.   Musculoskeletal: He exhibits no edema.   Neurological: He is alert and oriented to person, place, and time.   Skin: Skin is warm and dry.   Psychiatric: He has a normal mood and affect.   Vitals reviewed.      Assessment:       1. UTI (urinary tract infection), uncomplicated    2. Hypophosphatemia        Plan:             ESRD s/p kidney transplant and resultant CKD Stage 3 with stable kidney function. Repeat tac level     HTN is controlled    Mineral and Bone--improvement of addition of calcitriol. Continue exogenous bicarbonate    Mild anemia-Last Ferritin >1000 so no additional iron. No indication for ROMINA's since Hb >10    Screen for DM is negative    History of facial basal cell--Derm follow up.       RTC 4 months with labs prior

## 2018-09-24 ENCOUNTER — PATIENT MESSAGE (OUTPATIENT)
Dept: NEPHROLOGY | Facility: CLINIC | Age: 68
End: 2018-09-24

## 2018-11-06 ENCOUNTER — LAB VISIT (OUTPATIENT)
Dept: LAB | Facility: HOSPITAL | Age: 68
End: 2018-11-06
Attending: INTERNAL MEDICINE
Payer: MEDICARE

## 2018-11-06 DIAGNOSIS — Z94.0 KIDNEY REPLACED BY TRANSPLANT: ICD-10-CM

## 2018-11-06 LAB
ALBUMIN SERPL BCP-MCNC: 3.8 G/DL
ALBUMIN SERPL BCP-MCNC: 3.8 G/DL
ALP SERPL-CCNC: 58 U/L
ALT SERPL W/O P-5'-P-CCNC: 31 U/L
ANION GAP SERPL CALC-SCNC: 9 MMOL/L
AST SERPL-CCNC: 28 U/L
BASOPHILS # BLD AUTO: 0.03 K/UL
BASOPHILS NFR BLD: 0.5 %
BILIRUB DIRECT SERPL-MCNC: 0.3 MG/DL
BILIRUB SERPL-MCNC: 0.7 MG/DL
BUN SERPL-MCNC: 21 MG/DL
CALCIUM SERPL-MCNC: 8.1 MG/DL
CHLORIDE SERPL-SCNC: 110 MMOL/L
CO2 SERPL-SCNC: 26 MMOL/L
CREAT SERPL-MCNC: 1.8 MG/DL
DIFFERENTIAL METHOD: ABNORMAL
EOSINOPHIL # BLD AUTO: 0.1 K/UL
EOSINOPHIL NFR BLD: 1.9 %
ERYTHROCYTE [DISTWIDTH] IN BLOOD BY AUTOMATED COUNT: 13.6 %
EST. GFR  (AFRICAN AMERICAN): 44 ML/MIN/1.73 M^2
EST. GFR  (NON AFRICAN AMERICAN): 38.1 ML/MIN/1.73 M^2
GLUCOSE SERPL-MCNC: 85 MG/DL
HCT VFR BLD AUTO: 41.7 %
HGB BLD-MCNC: 13.3 G/DL
IMM GRANULOCYTES # BLD AUTO: 0.03 K/UL
IMM GRANULOCYTES NFR BLD AUTO: 0.5 %
LYMPHOCYTES # BLD AUTO: 0.8 K/UL
LYMPHOCYTES NFR BLD: 13 %
MAGNESIUM SERPL-MCNC: 1.8 MG/DL
MCH RBC QN AUTO: 31.2 PG
MCHC RBC AUTO-ENTMCNC: 31.9 G/DL
MCV RBC AUTO: 98 FL
MONOCYTES # BLD AUTO: 0.4 K/UL
MONOCYTES NFR BLD: 7.3 %
NEUTROPHILS # BLD AUTO: 4.6 K/UL
NEUTROPHILS NFR BLD: 76.8 %
NRBC BLD-RTO: 0 /100 WBC
PHOSPHATE SERPL-MCNC: 4.2 MG/DL
PLATELET # BLD AUTO: 148 K/UL
PMV BLD AUTO: 12 FL
POTASSIUM SERPL-SCNC: 4.1 MMOL/L
PROT SERPL-MCNC: 7 G/DL
RBC # BLD AUTO: 4.26 M/UL
SODIUM SERPL-SCNC: 145 MMOL/L
WBC # BLD AUTO: 5.92 K/UL

## 2018-11-06 PROCEDURE — 85025 COMPLETE CBC W/AUTO DIFF WBC: CPT

## 2018-11-06 PROCEDURE — 82247 BILIRUBIN TOTAL: CPT

## 2018-11-06 PROCEDURE — 84075 ASSAY ALKALINE PHOSPHATASE: CPT

## 2018-11-06 PROCEDURE — 80069 RENAL FUNCTION PANEL: CPT

## 2018-11-06 PROCEDURE — 80197 ASSAY OF TACROLIMUS: CPT

## 2018-11-06 PROCEDURE — 84155 ASSAY OF PROTEIN SERUM: CPT

## 2018-11-06 PROCEDURE — 87799 DETECT AGENT NOS DNA QUANT: CPT

## 2018-11-06 PROCEDURE — 83735 ASSAY OF MAGNESIUM: CPT

## 2018-11-06 PROCEDURE — 36415 COLL VENOUS BLD VENIPUNCTURE: CPT | Mod: PO

## 2018-11-06 NOTE — PROGRESS NOTES
Low Ca: Please stop sensipar and start calcitriol 0.5 mcg daily and check renal panel in a week. Thank you!

## 2018-11-07 ENCOUNTER — PATIENT MESSAGE (OUTPATIENT)
Dept: TRANSPLANT | Facility: CLINIC | Age: 68
End: 2018-11-07

## 2018-11-07 LAB — TACROLIMUS BLD-MCNC: 5.8 NG/ML

## 2018-11-07 RX ORDER — CALCITRIOL 0.5 UG/1
0.5 CAPSULE ORAL DAILY
Qty: 30 CAPSULE | Refills: 11 | Status: SHIPPED | OUTPATIENT
Start: 2018-11-07 | End: 2018-11-26 | Stop reason: SDUPTHER

## 2018-11-07 NOTE — TELEPHONE ENCOUNTER
Notified patient of Dr. Mcclain's review of 11/6/18 lab results via [facility]. Instructions given for patient to stop sensipar and start calcitriol 0.5 mcg by mouth daily and check renal panel in a week (11/131/18).

## 2018-11-11 NOTE — PROGRESS NOTES
Kidney Post-Transplant Assessment    Referring Physician: Lake Merchant  Current Nephrologist:     ORGAN: LEFT KIDNEY  Donor Type:  - brain death   PHS Increased Risk: yes  Cold Ischemia: 854 mins  Induction Medications: campath - alemtuzumab (anti-cd52)      Subjective:     CC:  Reassessment of renal allograft function and management of chronic immunosuppression.    Kidney History:  Mr. Burkett is a 68 y.o. 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 Mr. Burkett had  ESRD HD due to HTN  and congenital absent left kidney who was on dialysis since 6/16/10 until receiving PHS increased risk DDRT (pumped kidney, Campath induction, KDPI 36%, WIT 30 minutes, CIT 14 hours and 14 minutes, donor RPR positive thus patient completed PCN x3, CMV D+/R+) on 16. His most recent creatinine is 1.4. He takes mycophenolate mofetil, prednisone and tacrolimus  For Immunosuppression.       Post Transplant Course:   - Pt had DIVINE with suspected rejection on 17.  Kid bx  revealed AVR  Had thymo x 5-7 doses. DSA neg.  -  - He was rebiopsied on 17 which showed acute tubular injury and vacuolization, with + myoglobulin and negative heme.  - Recurrent UTI       Interval History:  He feels well, but he complains of burning sensation while urinating.  he denies any chest pain, shortness of breath, ENT symptoms, nausea/vomiting, or pain over the allograft. His home BPs are acceptable. He follows urology for his recurrent UTI.      Medications:   Current Outpatient Medications   Medication Sig Dispense Refill    calcitRIOL (ROCALTROL) 0.5 MCG Cap Take 1 capsule (0.5 mcg total) by mouth once daily. 30 capsule 11    famotidine (PEPCID) 20 MG tablet Take 1 tablet (20 mg total) by mouth every evening. 90 tablet 3    k phos di & mono-sod phos mono (K-PHOS-NEUTRAL) 250 mg Tab Take 3 tablets by mouth 2 (two) times daily. 480 tablet 11    ketoconazole  "(NIZORAL) 200 mg Tab Take 0.5 tablets (100 mg total) by mouth once daily. 45 tablet 3    levothyroxine (SYNTHROID) 100 MCG tablet Take 1 tablet (100 mcg total) by mouth once daily. 90 tablet 3    magnesium oxide (MAG-OX) 400 mg tablet Take 1 tablet (400 mg total) by mouth once daily. 90 tablet 3    metoprolol tartrate (LOPRESSOR) 25 MG tablet Take 0.5 tablets (12.5 mg total) by mouth 2 (two) times daily. 90 tablet 3    multivitamin (ONE DAILY MULTIVITAMIN) per tablet Take 1 tablet by mouth once daily.      mycophenolate (CELLCEPT) 250 mg Cap Take 3 capsules (750 mg total) by mouth 2 (two) times daily. Z94.0/Kidney Transplant on 11/26/16 540 capsule 3    predniSONE (DELTASONE) 5 MG tablet Take 1 tablet (5 mg total) by mouth once daily. Z94.0/Kidney transplant on 11/26/2016 90 tablet 3    sodium bicarbonate 650 MG tablet Take 1 tablet (650 mg total) by mouth 2 (two) times daily. 540 tablet 3    tacrolimus (PROGRAF) 1 MG Cap Take 3 mg by mouth every morning and take 2 mg every evening 150 capsule 11     No current facility-administered medications for this visit.            Review of Systems     Constitutional: Negative for fever, appetite change and fatigue.    Respiratory: Negative for cough, chest tightness, shortness of breath and wheezing.   Cardiovascular: Negative for chest pain, palpitations and leg swelling.   Gastrointestinal: Negative for nausea, vomiting, abdominal pain, constipation, blood in stool and abdominal distention.   Genitourinary: + urgency, frequency, No hematuria, decreased urine volume, difficulty urinating.  Hematological: Negative for adenopathy. Does not bruise/bleed easily.   Psychiatric/Behavioral: Negative for confusion, sleep disturbance and dysphoric mood. The patient is not nervous/anxious.        Objective:     Blood pressure (!) 140/88, pulse 66, temperature 99 °F (37.2 °C), temperature source Oral, resp. rate 16, height 5' 11" (1.803 m), weight 72 kg (158 lb 11.7 oz), SpO2 " 99 %.  body mass index is 22.14 kg/m².    Physical Exam     Respiratory: Clear to auscultation bilaterally, respirations unlabored, no rales/rhonchi/wheezing  Cardiovacular: Regular rate and rhythm, S1, S2 normal, no murmurs, rubs or gallops, no carotid bruit  Gastrointestinal: Soft, non-tender, bowel sounds normal, no hepatosplenomegaly  Renal allograft exam: No tenderness, no bruits, normal exam  Musculoskeletal: No knee or ankle joint tenderness or swelling.   Extremities: No clubbing or cyanosis of bilateral upper extremities; no lower extremity edema bilaterally  Skin: warm and dry; no rash on exposed skin  Neurologic: CN grossly intact, alert and oriented x 3    Labs:  Lab Results   Component Value Date    WBC 5.92 2018    HGB 13.3 (L) 2018    HCT 41.7 2018     2018    K 4.1 2018     2018    CO2 26 2018    BUN 21 2018    CREATININE 1.8 (H) 2018    EGFRNONAA 38.1 (A) 2018    CALCIUM 8.1 (L) 2018    PHOS 4.2 2018    MG 1.8 2018    ALBUMIN 3.8 2018    ALBUMIN 3.8 2018    AST 28 2018    ALT 31 2018    UTPCR 0.10 2018    .0 (H) 2018    TACROLIMUS 5.8 2018    CYCLOSPORINE <10 (L) 01/10/2017       No results found for: EXTANC, EXTWBC, EXTSEGS, EXTPLATELETS, EXTHEMOGLOBI, EXTHEMATOCRI, EXTCREATININ, EXTSODIUM, EXTPOTASSIUM, EXTBUN, EXTCO2, EXTCALCIUM, EXTPHOSPHORU, EXTGLUCOSE, EXTALBUMIN, EXTAST, EXTALT, EXTBILITOTAL, EXTLIPASE, EXTAMYLASE    No results found for: EXTCYCLOSLVL, EXTSIROLIMUS, EXTTACROLVL, EXTPROTCRE, EXTPTHINTACT, EXTPROTEINUA, EXTWBCUA, EXTRBCUA    Labs were reviewed with the patient.    Assessment/Plan:     1. Kidney transplant status    2. Long-term use of immunosuppressant medication    3. Secondary hyperparathyroidism, renal    4. -donor kidney transplant 16    5. Benign prostatic hyperplasia, unspecified whether lower urinary tract symptoms  present        Mr. Burkett is a 68 y.o. male with:       # History of ESRD presumed secondary to HTN and congenital solitary kidney  - s/p DDRT on 11/26/16  - Kid bx 1/6/17 revealed AVR. Had thymo x 5 doses. DSA neg.    - Kidney rebiopsy on 1/23/17 showed acute tubular injury and vacuolization, with + myoglobulin and negative heme.  - last Cr 1.8                      - non significant proteinuria    # Immunosuppression:   - continue Prograf and ketoconazole at the current dose  - continue  mg BID   - continue prednisone      # History of recurrent UTI  - has history of urethral stricture in the past  - follow with his urologist  - UA and urine culture as patient is symptomatic today       # Infectious Surveillance:   - last CMV : negative  - last BK PCR : negative    # HTN:   - BPs well controlled   - continue with home blood pressure monitoring  - low salt and healthy life discussed with the patient     # Metabolic Bone Disease/Secondary Hyperparathyroidism:  - High PTH and low vitamin D   - stable Ca, PO4 and Mg  - on ergocalciferol 32294 units weekly and calcitriol       # Anemia of chronic disease:   - Hb stable  - will continue monitoring as per our center guidelines.       # Hyperuricemia   - on low purine diet  - will monitor uric acid        # Erection dysfunction  - urology follow up              Plan:  - UA and urine culture today   - urology follow up for recurrent UTI and BPH  - PTH and vitamin D in 3-6 months

## 2018-11-12 ENCOUNTER — OFFICE VISIT (OUTPATIENT)
Dept: TRANSPLANT | Facility: CLINIC | Age: 68
End: 2018-11-12
Payer: MEDICARE

## 2018-11-12 VITALS
TEMPERATURE: 99 F | HEART RATE: 66 BPM | BODY MASS INDEX: 22.23 KG/M2 | OXYGEN SATURATION: 99 % | RESPIRATION RATE: 16 BRPM | HEIGHT: 71 IN | WEIGHT: 158.75 LBS | SYSTOLIC BLOOD PRESSURE: 140 MMHG | DIASTOLIC BLOOD PRESSURE: 88 MMHG

## 2018-11-12 DIAGNOSIS — Z79.60 LONG-TERM USE OF IMMUNOSUPPRESSANT MEDICATION: ICD-10-CM

## 2018-11-12 DIAGNOSIS — N25.81 SECONDARY HYPERPARATHYROIDISM, RENAL: ICD-10-CM

## 2018-11-12 DIAGNOSIS — N40.0 BENIGN PROSTATIC HYPERPLASIA, UNSPECIFIED WHETHER LOWER URINARY TRACT SYMPTOMS PRESENT: Chronic | ICD-10-CM

## 2018-11-12 DIAGNOSIS — Z94.0 KIDNEY TRANSPLANT STATUS: Primary | ICD-10-CM

## 2018-11-12 DIAGNOSIS — Z94.0 DECEASED-DONOR KIDNEY TRANSPLANT: ICD-10-CM

## 2018-11-12 PROBLEM — T86.10 COMPLICATION OF KIDNEY TRANSPLANT: Status: RESOLVED | Noted: 2017-01-23 | Resolved: 2018-11-12

## 2018-11-12 LAB
BACTERIA #/AREA URNS AUTO: ABNORMAL /HPF
BILIRUB UR QL STRIP: NEGATIVE
CLARITY UR REFRACT.AUTO: CLEAR
COLOR UR AUTO: YELLOW
GLUCOSE UR QL STRIP: NEGATIVE
HGB UR QL STRIP: NEGATIVE
KETONES UR QL STRIP: NEGATIVE
LEUKOCYTE ESTERASE UR QL STRIP: ABNORMAL
MICROSCOPIC COMMENT: ABNORMAL
NITRITE UR QL STRIP: NEGATIVE
PH UR STRIP: 6 [PH] (ref 5–8)
PROT UR QL STRIP: NEGATIVE
RBC #/AREA URNS AUTO: 1 /HPF (ref 0–4)
SP GR UR STRIP: 1.01 (ref 1–1.03)
SQUAMOUS #/AREA URNS AUTO: 0 /HPF
URN SPEC COLLECT METH UR: ABNORMAL
WBC #/AREA URNS AUTO: 51 /HPF (ref 0–5)

## 2018-11-12 PROCEDURE — 87088 URINE BACTERIA CULTURE: CPT

## 2018-11-12 PROCEDURE — 3077F SYST BP >= 140 MM HG: CPT | Mod: S$GLB,,, | Performed by: INTERNAL MEDICINE

## 2018-11-12 PROCEDURE — 99215 OFFICE O/P EST HI 40 MIN: CPT | Mod: S$GLB,,, | Performed by: INTERNAL MEDICINE

## 2018-11-12 PROCEDURE — 81001 URINALYSIS AUTO W/SCOPE: CPT

## 2018-11-12 PROCEDURE — 87077 CULTURE AEROBIC IDENTIFY: CPT

## 2018-11-12 PROCEDURE — 99999 PR PBB SHADOW E&M-EST. PATIENT-LVL V: CPT | Mod: PBBFAC,,, | Performed by: INTERNAL MEDICINE

## 2018-11-12 PROCEDURE — 87186 SC STD MICRODIL/AGAR DIL: CPT

## 2018-11-12 PROCEDURE — 87086 URINE CULTURE/COLONY COUNT: CPT

## 2018-11-12 PROCEDURE — 3079F DIAST BP 80-89 MM HG: CPT | Mod: S$GLB,,, | Performed by: INTERNAL MEDICINE

## 2018-11-12 PROCEDURE — 1101F PT FALLS ASSESS-DOCD LE1/YR: CPT | Mod: S$GLB,,, | Performed by: INTERNAL MEDICINE

## 2018-11-12 RX ORDER — SODIUM BICARBONATE 650 MG/1
650 TABLET ORAL 2 TIMES DAILY
Qty: 540 TABLET | Refills: 3 | Status: SHIPPED | OUTPATIENT
Start: 2018-11-12

## 2018-11-12 NOTE — PATIENT INSTRUCTIONS
Thank you for entrusting your transplant care to us, and visiting me today. Please:    - UA and urine culture today  - Feel free to call us with any concerns regarding your health care.  - Monitor your blood pressure and aim to keep < 140/90  - See a dermatologist for skin screening test annually as you have a higher risk of skin  cancer after transplant.  - See your nephrologist regularly and follow his/her recommendations.

## 2018-11-12 NOTE — Clinical Note
Clinic plan:- decrease sodium bicarbonate to 650 mg BID - decrease k-phos to 750 mg BID- Check PTH, vitamin D in 6 months- UA, urine culture today

## 2018-11-12 NOTE — Clinical Note
November 12, 2018                     [address]  [address]  [facility] [address]  Phone: [phone]   Patient: Alin Burkett   MR Number: [id number]   YOB: 1950   Date of Visit: 11/12/2018       Dear      Thank you for referring Alin Burkett to me for evaluation. Attached you will find relevant portions of my assessment and plan of care.    If you have questions, please do not hesitate to call me. I look forward to following Alin Burkett along with you.    Sincerely,    Chasidy Mcclain MD    Enclosure    If you would like to receive this communication electronically, please contact [email or web address] or [phone] to request [employer] access.    [employer] is a tool which provides read-only access to select patient information with whom you have a relationship. Its easy to use and provides real time access to review your patients record including encounter summaries, notes, results, and demographic information.    If you feel you have received this communication in error or would no longer like to receive these types of communications, please e-mail [email or web address]

## 2018-11-13 ENCOUNTER — LAB VISIT (OUTPATIENT)
Dept: LAB | Facility: HOSPITAL | Age: 68
End: 2018-11-13
Attending: INTERNAL MEDICINE
Payer: MEDICARE

## 2018-11-13 DIAGNOSIS — Z94.0 KIDNEY REPLACED BY TRANSPLANT: ICD-10-CM

## 2018-11-13 LAB
ALBUMIN SERPL BCP-MCNC: 3.6 G/DL
ANION GAP SERPL CALC-SCNC: 9 MMOL/L
BUN SERPL-MCNC: 18 MG/DL
CALCIUM SERPL-MCNC: 9.1 MG/DL
CHLORIDE SERPL-SCNC: 109 MMOL/L
CO2 SERPL-SCNC: 25 MMOL/L
CREAT SERPL-MCNC: 1.4 MG/DL
EST. GFR  (AFRICAN AMERICAN): 59.7 ML/MIN/1.73 M^2
EST. GFR  (NON AFRICAN AMERICAN): 51.6 ML/MIN/1.73 M^2
GLUCOSE SERPL-MCNC: 77 MG/DL
PHOSPHATE SERPL-MCNC: 3.1 MG/DL
POTASSIUM SERPL-SCNC: 4 MMOL/L
SODIUM SERPL-SCNC: 143 MMOL/L

## 2018-11-13 PROCEDURE — 36415 COLL VENOUS BLD VENIPUNCTURE: CPT | Mod: PO

## 2018-11-13 PROCEDURE — 80069 RENAL FUNCTION PANEL: CPT

## 2018-11-14 RX ORDER — CIPROFLOXACIN 500 MG/1
500 TABLET ORAL 2 TIMES DAILY
Qty: 14 TABLET | Refills: 0 | Status: SHIPPED | OUTPATIENT
Start: 2018-11-14 | End: 2018-11-21

## 2018-11-14 NOTE — PROGRESS NOTES
Please start ciprofloxacin 500 mg BID X 7 days. Urology consult with Dr. Mann for recurrent UTI. Thank you!

## 2018-11-14 NOTE — TELEPHONE ENCOUNTER
Notified patient of Dr. Mcclain's review of 11/12/18 urine results via [email or web address]. Instructions given for patient to take Cipro 500mg twice daily; follow-up with Urologist,Dr. Mann for recurrent UTI.     Coordinator will follow-up with patient verify that he received the message with medication instructions.

## 2018-11-14 NOTE — TELEPHONE ENCOUNTER
----- Message from Chasidy Mcclain MD sent at 11/14/2018  9:26 AM CST -----  Please start ciprofloxacin 500 mg BID X 7 days. Urology consult with Dr. Mann for recurrent UTI. Thank you!

## 2018-11-16 LAB — BACTERIA UR CULT: NORMAL

## 2018-11-23 ENCOUNTER — PES CALL (OUTPATIENT)
Dept: ADMINISTRATIVE | Facility: CLINIC | Age: 68
End: 2018-11-23

## 2018-11-26 RX ORDER — CALCITRIOL 0.5 UG/1
0.5 CAPSULE ORAL DAILY
Qty: 90 CAPSULE | Refills: 0 | Status: SHIPPED | OUTPATIENT
Start: 2018-11-26 | End: 2019-03-04 | Stop reason: SDUPTHER

## 2018-11-26 NOTE — TELEPHONE ENCOUNTER
----- Message from Amara Segovia sent at 11/26/2018 10:40 AM CST -----  Contact: Patient  Needs Advice    Reason for call: Pt requesting his [facility]RIOL [facility].         Communication Preference: [phone]    Additional Information: n/a

## 2018-11-27 ENCOUNTER — OFFICE VISIT (OUTPATIENT)
Dept: UROLOGY | Facility: CLINIC | Age: 68
End: 2018-11-27
Payer: MEDICARE

## 2018-11-27 ENCOUNTER — TELEPHONE (OUTPATIENT)
Dept: FAMILY MEDICINE | Facility: CLINIC | Age: 68
End: 2018-11-27

## 2018-11-27 ENCOUNTER — TELEPHONE (OUTPATIENT)
Dept: UROLOGY | Facility: CLINIC | Age: 68
End: 2018-11-27

## 2018-11-27 VITALS
SYSTOLIC BLOOD PRESSURE: 130 MMHG | WEIGHT: 157.19 LBS | BODY MASS INDEX: 22.01 KG/M2 | HEIGHT: 71 IN | DIASTOLIC BLOOD PRESSURE: 92 MMHG | HEART RATE: 63 BPM

## 2018-11-27 DIAGNOSIS — N32.3 BLADDER DIVERTICULUM: ICD-10-CM

## 2018-11-27 DIAGNOSIS — Z87.448 H/O URETHRAL STRICTURE: ICD-10-CM

## 2018-11-27 DIAGNOSIS — N20.0 KIDNEY STONE: ICD-10-CM

## 2018-11-27 DIAGNOSIS — N39.0 RECURRENT UTI: Primary | ICD-10-CM

## 2018-11-27 PROCEDURE — 99214 OFFICE O/P EST MOD 30 MIN: CPT | Mod: S$GLB,,, | Performed by: UROLOGY

## 2018-11-27 PROCEDURE — 3075F SYST BP GE 130 - 139MM HG: CPT | Mod: S$GLB,,, | Performed by: UROLOGY

## 2018-11-27 PROCEDURE — 99999 PR PBB SHADOW E&M-EST. PATIENT-LVL IV: CPT | Mod: PBBFAC,,, | Performed by: UROLOGY

## 2018-11-27 PROCEDURE — 1101F PT FALLS ASSESS-DOCD LE1/YR: CPT | Mod: S$GLB,,, | Performed by: UROLOGY

## 2018-11-27 PROCEDURE — 87086 URINE CULTURE/COLONY COUNT: CPT

## 2018-11-27 PROCEDURE — 87088 URINE BACTERIA CULTURE: CPT

## 2018-11-27 PROCEDURE — 3080F DIAST BP >= 90 MM HG: CPT | Mod: S$GLB,,, | Performed by: UROLOGY

## 2018-11-27 PROCEDURE — 87077 CULTURE AEROBIC IDENTIFY: CPT

## 2018-11-27 PROCEDURE — 87186 SC STD MICRODIL/AGAR DIL: CPT

## 2018-11-27 NOTE — H&P (VIEW-ONLY)
CC: recurrent  UTI, bladder diverticulum, urethral stricture    Alin Burkett is a 68 y.o. man who is here for the evaluation of urethral stricture , unspecified stricture type (patient follow up on today .)  s/p cysto and urethral dilation for anterior urethral stricture on .  Bladder diverticulum was also seen on cysto.  He is here to discuss the recurrent urinary tract infections. His only symptom is small degree of burning before he urinates whenever his urine cultures are positive  No fevers, no dysuria or hematuria. He still does self-cath with 16 Fr catheter occasionally but has not does this in over 3 months.  His stream is good most of the time, but he reports occasional trickling. He also occasionally passes some debris that may be stones.    3 most recent urine cultures are proteus, enterococcus, and klebsiella.    PVR today is 0 mL    Past Medical History:   Diagnosis Date    Acidosis     Adrenal adenoma     Anemia associated with chronic renal failure     Arrhythmia, onset 2015    Awaiting organ transplant status 2013    Basal cell carcinoma 2012    left nasal tip    Blood type B+ 2013    Cancer     Celiac artery dissection     Chronic diarrhea     Chronic urethral stricture     Congenital absence of kidney     left    -donor kidney transplant 16     Induced w Campath 30 mg IV intraoperatively & SoluMedrol 875 mg total over 3 days.  Renal allograft biopsy 17 (DIVINE): 21 glomeruli, none globally sclerosed, <5% interstitial fibrosis, no ACR, c4d negative, AVR CCT Type 2 (V1 lesion); plan THYMO     Dissecting aortic aneurysm (any part), abdominal     Diverticulosis     Encounter for blood transfusion     ESRD (end stage renal disease) 2010    H/O: urethral stricture     History of AAA (abdominal aortic aneurysm) repair     Hypertension     Hypokalemia     Hypothyroidism 1/10/2014    Inguinal hernia bilateral, non-recurrent      Kidney stones     Organ transplant candidate 2013    Plantar warts 1/10/2014    Recurrent UTI 2017    S/P kidney transplant     Secondary hyperparathyroidism, renal     Thyroid disease      Past Surgical History:   Procedure Laterality Date    ABDOMINAL SURGERY      exploratory lapatomy x 2    AORTA - SUPERIOR MESENTERIC ARTERY BYPASS GRAFT      BIOPSY - Ultrasound guided N/A 2017    [facility]formed [facility] at [facility] OR 2ND [facility]    BLADDER NECK RECONSTRUCTION      BLADDER SURGERY      COLONOSCOPY  10/10/2013    Dr. Gutierrez, repeat in 5 years    COLONOSCOPY N/A 10/10/2013    [facility] at [facility]    CYSTOSCOPY      CYSTOSCOPY N/A 2017    [facility]formed by Dewey [facility] at [facility] OR 1ST [facility]    CYSTOSCOPY N/A 2017    [facility]formed by [facility] at [facility] OR [facility]ST [facility]    GASTROJEJUNOSTOMY      HEMORRHOID SURGERY      HERNIA REPAIR      KIDNEY TRANSPLANT      LITHOTRIPSY      PERCUTANEOUS NEPHROLITHOTRIPSY      right  ESWL  10/31/12    right ESWL  12    TRANSPLANT-KIDNEY N/A 2016    Performed by Andrew Lopez Jr., MD at [facility] OR 2ND FLR    URETHROGRAM-RETROGRADE N/A 2017    Performed by [facility] at [facility] OR 1ST [facility]    URETHROTOMY-DIRECT VISUAL INTERNAL (DVIU) N/A 2017    Performed by [facility] at [facility] OR 1ST [facility]    URETHROTOMY-DIRECT VISUAL INTERNAL (DVIU) N/A 2017    Performed by Dewey Mann MD at [facility] OR 1ST [place]     Social History     Tobacco Use    Smoking status: Former Smoker     Years: 40.00     Types: Cigarettes     Last attempt to quit: 2010     Years since quittin.4    Smokeless tobacco: Never Used   Substance Use Topics    Alcohol use: No     Comment: stopped ETOH in     Drug use: No     Comment: THC in youth     Family History   Problem Relation Age of Onset    Diabetes Mother     Cancer Brother         thyroid cancer    Stroke Maternal Aunt     Kidney disease  Paternal Uncle     Kidney disease Cousin     Melanoma Neg Hx     Psoriasis Neg Hx     Lupus Neg Hx     Eczema Neg Hx     Colon cancer Neg Hx     Colon polyps Neg Hx     Crohn's disease Neg Hx     Ulcerative colitis Neg Hx     Celiac disease Neg Hx      Allergy:  Review of patient's allergies indicates:  No Known Allergies  Outpatient Encounter Medications as of 11/27/2018   Medication Sig Dispense Refill    calcitRIOL (ROCALTROL) 0.5 MCG Cap Take 1 capsule (0.5 mcg total) by mouth once daily. 90 capsule 0    famotidine (PEPCID) 20 MG tablet Take 1 tablet (20 mg total) by mouth every evening. 90 tablet 3    k phos di & mono-sod phos mono (K-PHOS-NEUTRAL) 250 mg Tab Take 3 tablets by mouth 2 (two) times daily. 480 tablet 11    ketoconazole (NIZORAL) 200 mg Tab Take 0.5 tablets (100 mg total) by mouth once daily. 45 tablet 3    levothyroxine (SYNTHROID) 100 MCG tablet Take 1 tablet (100 mcg total) by mouth once daily. 90 tablet 3    magnesium oxide (MAG-OX) 400 mg tablet Take 1 tablet (400 mg total) by mouth once daily. 90 tablet 3    metoprolol tartrate (LOPRESSOR) 25 MG tablet Take 0.5 tablets (12.5 mg total) by mouth 2 (two) times daily. 90 tablet 3    multivitamin (ONE DAILY MULTIVITAMIN) per tablet Take 1 tablet by mouth once daily.      mycophenolate (CELLCEPT) 250 mg Cap Take 3 capsules (750 mg total) by mouth 2 (two) times daily. Z94.0/Kidney Transplant on 11/26/16 540 capsule 3    predniSONE (DELTASONE) 5 MG tablet Take 1 tablet (5 mg total) by mouth once daily. Z94.0/Kidney transplant on 11/26/2016 90 tablet 3    sodium bicarbonate 650 MG tablet Take 1 tablet (650 mg total) by mouth 2 (two) times daily. 540 tablet 3    tacrolimus (PROGRAF) 1 MG Cap Take 3 mg by mouth every morning and take 2 mg every evening 150 capsule 11     No facility-administered encounter medications on file as of 11/27/2018.      Review of Systems   General ROS: GENERAL:  No weight gain or loss  SKIN:  No rashes  or lacerations  HEAD:  No headaches  EYES:  No exophthalmos, jaundice or blindness  EARS:  No dizziness, tinnitus or hearing loss  NOSE:  No changes in smell  MOUTH & THROAT:  No dyskinetic movements or obvious goiter  CHEST:  No shortness of breath, hyperventilation or cough  CARDIOVASCULAR:  No tachycardia or chest pain  ABDOMEN:  No nausea, vomiting, pain, constipation or diarrhea  URINARY:  No frequency, dysuria or sexual dysfunction  ENDOCRINE:  No polydipsia, polyuria  MUSCULOSKELETAL:  No pain or stiffness of the joints  NEUROLOGIC:  No weakness, sensory changes, seizures, confusion, memory loss, tremor or other abnormal movements    Physical Exam     Vitals:    11/27/18 0943   BP: (!) 130/92   Pulse: 63     Physical Exam   Nursing note and vitals reviewed.  Constitutional: He is oriented to person, place, and time. He appears well-developed and well-nourished. No distress.   Neck: No tracheal deviation present. No thyromegaly present.   Cardiovascular: Normal rate and intact distal pulses.    Pulmonary/Chest: Effort normal. No accessory muscle usage. No respiratory distress.   Abdominal: Soft. He exhibits no distension and no mass. There is no splenomegaly or hepatomegaly. There is no tenderness. There is no CVA tenderness. No hernia.   Musculoskeletal: He exhibits no edema or tenderness.   Lymphadenopathy:     He has no cervical adenopathy.   Neurological: He is alert and oriented to person, place, and time.   Skin: Skin is warm and dry. No lesion and no rash noted. No cyanosis.     Psychiatric: He has a normal mood and affect.               LABS:  Lab Results   Component Value Date    PSA 0.41 10/18/2016    PSA 0.32 10/20/2015    PSA 0.45 11/04/2014    PSA 0.79 11/21/2013    PSA 0.53 01/04/2013    PSA 1.1 05/17/2011    PSA 0.4 04/23/2008     No results found for this or any previous visit.  Lab Results   Component Value Date    CREATININE 1.4 11/13/2018    CREATININE 1.8 (H) 11/06/2018    CREATININE 1.8  (H) 09/19/2018     Results for orders placed or performed in visit on 10/03/17   TESTOSTERONE   Result Value Ref Range    Testosterone, Total 543 195.0 - 1138.0 ng/dL   Results for orders placed or performed in visit on 05/15/12   Testosterone   Result Value Ref Range    Testosterone, Total 652 195 - 1138 ng/dl     Urine Culture, Routine   Date Value Ref Range Status   11/12/2018 PROTEUS MIRABILIS  >100,000 cfu/ml    Final     PVR 0 ml per bladder scan  UA clear    Assessment and Plan:  Alin was seen today for urethral stricture , unspecified stricture type.    Diagnoses and all orders for this visit:    Recurrent UTI  -     CT Renal Stone Study ABD Pelvis WO; Future  -     X-Ray Abdomen AP 1 View; Future  -     EKG 12-lead; Future    Bladder diverticulum  -     EKG 12-lead; Future    H/O urethral stricture  -     EKG 12-lead; Future      CTRSS to evaluate for stone disease given proteus urine culture and history  Will do cysto to re-evaluate his urethral stricture, possible dilation, possible DVIU. Also stone intervention at that time if required  Will evaluate diverticulum at that time as well to determine if that is likely cause of infections.  I spent 25 minutes with the patient of which more than half was spent in direct consultation with the patient in regards to our treatment and plan.      Follow-up:  Follow-up for cysto under anesthesia, poss. DVIU.

## 2018-11-27 NOTE — TELEPHONE ENCOUNTER
----- Message from Guadalupe Boyle sent at 11/27/2018  1:09 PM CST -----  Type: Needs Medical Advice    Who Called:  Patient  Best Call Back Number: [phone]  Additional Information: Needs to reschedule his Annual Wellness Visit on 12/19/18. Please call back to reschedule.

## 2018-11-27 NOTE — PROGRESS NOTES
CC: recurrent  UTI, bladder diverticulum, urethral stricture    Alin Burkett is a 68 y.o. man who is here for the evaluation of urethral stricture , unspecified stricture type (patient follow up on today .)  s/p cysto and urethral dilation for anterior urethral stricture on .  Bladder diverticulum was also seen on cysto.  He is here to discuss the recurrent urinary tract infections. His only symptom is small degree of burning before he urinates whenever his urine cultures are positive  No fevers, no dysuria or hematuria. He still does self-cath with 16 Fr catheter occasionally but has not does this in over 3 months.  His stream is good most of the time, but he reports occasional trickling. He also occasionally passes some debris that may be stones.    3 most recent urine cultures are proteus, enterococcus, and klebsiella.    PVR today is 0 mL    Past Medical History:   Diagnosis Date    Acidosis     Adrenal adenoma     Anemia associated with chronic renal failure     Arrhythmia, onset 2015    Awaiting organ transplant status 2013    Basal cell carcinoma 2012    left nasal tip    Blood type B+ 2013    Cancer     Celiac artery dissection     Chronic diarrhea     Chronic urethral stricture     Congenital absence of kidney     left    -donor kidney transplant 16     Induced w Campath 30 mg IV intraoperatively & SoluMedrol 875 mg total over 3 days.  Renal allograft biopsy 17 (DIVINE): 21 glomeruli, none globally sclerosed, <5% interstitial fibrosis, no ACR, c4d negative, AVR CCT Type 2 (V1 lesion); plan THYMO     Dissecting aortic aneurysm (any part), abdominal     Diverticulosis     Encounter for blood transfusion     ESRD (end stage renal disease) 2010    H/O: urethral stricture     History of AAA (abdominal aortic aneurysm) repair     Hypertension     Hypokalemia     Hypothyroidism 1/10/2014    Inguinal hernia bilateral, non-recurrent      Kidney stones     Organ transplant candidate 2013    Plantar warts 1/10/2014    Recurrent UTI 2017    S/P kidney transplant     Secondary hyperparathyroidism, renal     Thyroid disease      Past Surgical History:   Procedure Laterality Date    ABDOMINAL SURGERY      exploratory lapatomy x 2    AORTA - SUPERIOR MESENTERIC ARTERY BYPASS GRAFT      BIOPSY - Ultrasound guided N/A 2017    Performed by Deb [facility] at [facility] OR 2ND FLR    BLADDER NECK RECONSTRUCTION      BLADDER SURGERY      COLONOSCOPY  10/10/2013    Dr. Gutierrez, repeat in 5 years    COLONOSCOPY N/A 10/10/2013    [facility]    CYSTOSCOPY      CYSTOSCOPY N/A 2017    Performed by Dewey Mann MD at [facility] OR 1ST FLR    CYSTOSCOPY N/A 2017    Performed by Dewey Mann MD at [facility] OR 1ST [facility]    GASTROJEJUNOSTOMY      HEMORRHOID SURGERY      HERNIA REPAIR      KIDNEY TRANSPLANT      LITHOTRIPSY      PERCUTANEOUS NEPHROLITHOTRIPSY      right  ESWL  10/31/12    right ESWL  12    TRANSPLANT-KIDNEY N/A 2016    Performed by Andrew Lopez Jr., MD at [facility] OR 2ND FLR    URETHROGRAM-RETROGRADE N/A 2017    Performed by Dewey Mann MD at [facility] OR 1ST [facility]LR    URETHROTOMY-DIRECT VISUAL INTERNAL (DVIU) N/A 2017    Performed by Dewey Mann MD at [facility] OR 1ST [facility]LR    URETHROTOMY-DIRECT VISUAL INTERNAL (DVIU) N/A 2017    Performed by Dewey Mann MD at [facility] [place] [place]     Social History     Tobacco Use    Smoking status: Former Smoker     Years: 40.00     Types: Cigarettes     Last attempt to quit: 2010     Years since quittin.4    Smokeless tobacco: Never Used   Substance Use Topics    Alcohol use: No     Comment: stopped ETOH in     Drug use: No     Comment: THC in youth     Family History   Problem Relation Age of Onset    Diabetes Mother     Cancer Brother         thyroid cancer    Stroke Maternal Aunt     Kidney disease  Paternal Uncle     Kidney disease Cousin     Melanoma Neg Hx     Psoriasis Neg Hx     Lupus Neg Hx     Eczema Neg Hx     Colon cancer Neg Hx     Colon polyps Neg Hx     Crohn's disease Neg Hx     Ulcerative colitis Neg Hx     Celiac disease Neg Hx      Allergy:  Review of patient's allergies indicates:  No Known Allergies  Outpatient Encounter Medications as of 11/27/2018   Medication Sig Dispense Refill    calcitRIOL (ROCALTROL) 0.5 MCG Cap Take 1 capsule (0.5 mcg total) by mouth once daily. 90 capsule 0    famotidine (PEPCID) 20 MG tablet Take 1 tablet (20 mg total) by mouth every evening. 90 tablet 3    k phos di & mono-sod phos mono (K-PHOS-NEUTRAL) 250 mg Tab Take 3 tablets by mouth 2 (two) times daily. 480 tablet 11    ketoconazole (NIZORAL) 200 mg Tab Take 0.5 tablets (100 mg total) by mouth once daily. 45 tablet 3    levothyroxine (SYNTHROID) 100 MCG tablet Take 1 tablet (100 mcg total) by mouth once daily. 90 tablet 3    magnesium oxide (MAG-OX) 400 mg tablet Take 1 tablet (400 mg total) by mouth once daily. 90 tablet 3    metoprolol tartrate (LOPRESSOR) 25 MG tablet Take 0.5 tablets (12.5 mg total) by mouth 2 (two) times daily. 90 tablet 3    multivitamin (ONE DAILY MULTIVITAMIN) per tablet Take 1 tablet by mouth once daily.      mycophenolate (CELLCEPT) 250 mg Cap Take 3 capsules (750 mg total) by mouth 2 (two) times daily. Z94.0/Kidney Transplant on 11/26/16 540 capsule 3    predniSONE (DELTASONE) 5 MG tablet Take 1 tablet (5 mg total) by mouth once daily. Z94.0/Kidney transplant on 11/26/2016 90 tablet 3    sodium bicarbonate 650 MG tablet Take 1 tablet (650 mg total) by mouth 2 (two) times daily. 540 tablet 3    tacrolimus (PROGRAF) 1 MG Cap Take 3 mg by mouth every morning and take 2 mg every evening 150 capsule 11     No facility-administered encounter medications on file as of 11/27/2018.      Review of Systems   General ROS: GENERAL:  No weight gain or loss  SKIN:  No rashes  or lacerations  HEAD:  No headaches  EYES:  No exophthalmos, jaundice or blindness  EARS:  No dizziness, tinnitus or hearing loss  NOSE:  No changes in smell  MOUTH & THROAT:  No dyskinetic movements or obvious goiter  CHEST:  No shortness of breath, hyperventilation or cough  CARDIOVASCULAR:  No tachycardia or chest pain  ABDOMEN:  No nausea, vomiting, pain, constipation or diarrhea  URINARY:  No frequency, dysuria or sexual dysfunction  ENDOCRINE:  No polydipsia, polyuria  MUSCULOSKELETAL:  No pain or stiffness of the joints  NEUROLOGIC:  No weakness, sensory changes, seizures, confusion, memory loss, tremor or other abnormal movements    Physical Exam     Vitals:    11/27/18 0943   BP: (!) 130/92   Pulse: 63     Physical Exam   Nursing note and vitals reviewed.  Constitutional: He is oriented to person, place, and time. He appears well-developed and well-nourished. No distress.   Neck: No tracheal deviation present. No thyromegaly present.   Cardiovascular: Normal rate and intact distal pulses.    Pulmonary/Chest: Effort normal. No accessory muscle usage. No respiratory distress.   Abdominal: Soft. He exhibits no distension and no mass. There is no splenomegaly or hepatomegaly. There is no tenderness. There is no CVA tenderness. No hernia.   Musculoskeletal: He exhibits no edema or tenderness.   Lymphadenopathy:     He has no cervical adenopathy.   Neurological: He is alert and oriented to person, place, and time.   Skin: Skin is warm and dry. No lesion and no rash noted. No cyanosis.     Psychiatric: He has a normal mood and affect.               LABS:  Lab Results   Component Value Date    PSA 0.41 10/18/2016    PSA 0.32 10/20/2015    PSA 0.45 11/04/2014    PSA 0.79 11/21/2013    PSA 0.53 01/04/2013    PSA 1.1 05/17/2011    PSA 0.4 04/23/2008     No results found for this or any previous visit.  Lab Results   Component Value Date    CREATININE 1.4 11/13/2018    CREATININE 1.8 (H) 11/06/2018    CREATININE 1.8  (H) 09/19/2018     Results for orders placed or performed in visit on 10/03/17   TESTOSTERONE   Result Value Ref Range    Testosterone, Total 543 195.0 - 1138.0 ng/dL   Results for orders placed or performed in visit on 05/15/12   Testosterone   Result Value Ref Range    Testosterone, Total 652 195 - 1138 ng/dl     Urine Culture, Routine   Date Value Ref Range Status   11/12/2018 PROTEUS MIRABILIS  >100,000 cfu/ml    Final     PVR 0 ml per bladder scan  UA clear    Assessment and Plan:  Alin was seen today for urethral stricture , unspecified stricture type.    Diagnoses and all orders for this visit:    Recurrent UTI  -     CT Renal Stone Study ABD Pelvis WO; Future  -     X-Ray Abdomen AP 1 View; Future  -     EKG 12-lead; Future    Bladder diverticulum  -     EKG 12-lead; Future    H/O urethral stricture  -     EKG 12-lead; Future      CTRSS to evaluate for stone disease given proteus urine culture and history  Will do cysto to re-evaluate his urethral stricture, possible dilation, possible DVIU. Also stone intervention at that time if required  Will evaluate diverticulum at that time as well to determine if that is likely cause of infections.  I spent 25 minutes with the patient of which more than half was spent in direct consultation with the patient in regards to our treatment and plan.      Follow-up:  Follow-up for cysto under anesthesia, poss. DVIU.

## 2018-11-30 ENCOUNTER — HOSPITAL ENCOUNTER (OUTPATIENT)
Dept: RADIOLOGY | Facility: HOSPITAL | Age: 68
Discharge: HOME | End: 2018-11-30
Attending: UROLOGY
Payer: MEDICARE

## 2018-11-30 DIAGNOSIS — N39.0 RECURRENT UTI: ICD-10-CM

## 2018-11-30 PROCEDURE — 74176 CT ABD & PELVIS W/O CONTRAST: CPT | Mod: 26,,, | Performed by: RADIOLOGY

## 2018-11-30 PROCEDURE — 74018 RADEX ABDOMEN 1 VIEW: CPT | Mod: TC,FY

## 2018-11-30 PROCEDURE — 74018 RADEX ABDOMEN 1 VIEW: CPT | Mod: 26,,, | Performed by: RADIOLOGY

## 2018-11-30 PROCEDURE — 74176 CT ABD & PELVIS W/O CONTRAST: CPT | Mod: TC

## 2018-12-02 LAB — BACTERIA UR CULT: NORMAL

## 2018-12-03 ENCOUNTER — TELEPHONE (OUTPATIENT)
Dept: UROLOGY | Facility: CLINIC | Age: 68
End: 2018-12-03

## 2018-12-03 ENCOUNTER — PATIENT MESSAGE (OUTPATIENT)
Dept: UROLOGY | Facility: CLINIC | Age: 68
End: 2018-12-03

## 2018-12-03 DIAGNOSIS — N30.00 ACUTE CYSTITIS WITHOUT HEMATURIA: Primary | ICD-10-CM

## 2018-12-03 RX ORDER — NITROFURANTOIN 25; 75 MG/1; MG/1
100 CAPSULE ORAL 2 TIMES DAILY
Qty: 14 CAPSULE | Refills: 0 | Status: SHIPPED | OUTPATIENT
Start: 2018-12-03 | End: 2018-12-10

## 2018-12-03 NOTE — TELEPHONE ENCOUNTER
Acute cystitis without hematuria  -     nitrofurantoin, macrocrystal-monohydrate, (MACROBID) 100 MG capsule; Take 1 capsule (100 mg total) by mouth 2 (two) times daily. for 7 days  Dispense: 14 capsule; Refill: 0    eRxed to the pharmacy.  Please call and advise the patient to take the medication as given.

## 2018-12-10 ENCOUNTER — ANESTHESIA EVENT (OUTPATIENT)
Dept: SURGERY | Facility: HOSPITAL | Age: 68
End: 2018-12-10
Payer: MEDICARE

## 2018-12-10 NOTE — ANESTHESIA PREPROCEDURE EVALUATION
Jess Freed, RN   Registered Nurse      Pre Admission Screening   Signed                             []Hide copied text    []Hover for details      Anesthesia Assessment: Preoperative EQUATION     Planned Procedure: Procedure(s) (LRB):  CYSTOSCOPY (N/A)  CYSTOSCOPY, WITH DIRECT VISION INTERNAL URETHROTOMY (N/A)  DILATION, URETHRA (N/A)  Requested Anesthesia Type:Monitor Anesthesia Care  Surgeon: Dewey Mann MD  Service: Urology  Known or anticipated Date of Surgery:12/19/2018     Surgeon notes: reviewed     Electronic QUestionnaire Assessment completed via nurse interview with patient.         No AQ        Triage considerations:      The patient has no apparent active cardiac condition (No unstable coronary Syndrome such as severe unstable angina or recent [<1 month] myocardial infarction, decompensated CHF, severe valvular   disease or significant arrhythmia)     Previous anesthesia records:GETA, MAC and No problems -HX of TMJ   12/27/17 cysto:   Airway/Jaw/Neck:  Airway Findings: Mouth Opening: Normal Tongue: Normal  General Airway Assessment: Adult  Mallampati: II  TM Distance: Normal, at least 6 cm              Last PCP note: 6-12 months ago   Subspecialty notes: Endocrinology, Gastroenterology, Kidney Transplant     Other important co-morbidities: S/P Kidney transplant d/t HTN 11/2016, HTN, GERD, HX anemia, Hyperparathyroidism, hypothyroidism     Tests already available:  Available tests,  within 3 months . 11/13/18 renal function, 11/6/18 CBC, tacro level. 2016 EKG and echo.                            Instructions given. (See in Nurse's note)     Optimization:  Anesthesia Preop Clinic Assessment  Indicated-not required for this procedure                Plan:    Testing:  none                              Consultation:will notify kidney transplant                           Patient  has previously scheduled Medical Appointment: none     Navigation:              Consults scheduled.                         Results will be tracked by Preop Clinic.             Electronically signed by Jess Freed RN at 12/10/2018 11:07 AM       Pre-admit on 12/19/2018            Detailed Report    12/10/18 Kidney transplant note/recommendations:   MD Jess Machado, RN; REHAN Umaña Staff             Thank you for taking care of patient! Just hydration at this point. I assume he has no active infection.  Right?     Thank you!   Chasidy                                                                                                                      12/10/2018  Alin Burkett is a 68 y.o., male.    Anesthesia Evaluation         Review of Systems  Anesthesia Hx:  No problems with previous Anesthesia History of prior surgery of interest to airway management or planning: Previous anesthesia: MAC  12/2017 cysto with MAC.  Procedure performed at an [facility]. Denies Family Hx of Anesthesia complications.   Denies Personal Hx of Anesthesia complications.   Social:  Former Smoker    Hematology/Oncology:  Hematology Normal   Oncology Normal     EENT/Dental:   Jaw Problems:  Clicking (TMJ)   Cardiovascular:   Hypertension, well controlled Denies MI.    Denies Angina.  Functional Capacity good / => 4 METS  Abdominal Aortic Aneurysm, s/p surgical repair  Hypertension , Well Controlled on Rx    Pulmonary:   Denies Shortness of breath.  Denies Recent URI.  Obstructive Sleep Apnea (LOUISA).   Renal/:  Renal Symptoms/Infections/Stones:  Urinary Tract Infection (UTI) (completes abx 12/10/18) Kidney Function/Disease, Chronic Kidney Disease (CKD) , CKD Stage III (GFR 30-59) S/P Kidney Transplant (D/T HTN), functioning, stable.   Hepatic/GI:  Esophageal / Stomach Disorders Gerd Controlled by chronic antireflux medication.    Musculoskeletal:  Musculoskeletal Normal    Neurological:  Neurology Normal    Endocrine:   Denies Diabetes.  Thyroid Disease Hypothyroidism  Parathyroid Disease, Hyperparathyroidism  Adrenal  Disease (incidentaloma)    Psych:  Psychiatric Normal           Physical Exam  General:  Well nourished    Airway/Jaw/Neck:  Airway Findings: Mouth Opening: Normal Tongue: Normal  General Airway Assessment: Adult, Average  Mallampati: II  TM Distance: Normal, at least 6 cm  Jaw/Neck Findings:  Neck ROM: Normal ROM            Mental Status:  Mental Status Findings:  Cooperative, Alert and Oriented         Anesthesia Plan  Type of Anesthesia, risks & benefits discussed:  Anesthesia Type:  general  Patient's Preference:   Intra-op Monitoring Plan:   Intra-op Monitoring Plan Comments:   Post Op Pain Control Plan:   Post Op Pain Control Plan Comments: As per surgeon's plan  Induction:   IV  Beta Blocker:  Patient is not currently on a Beta-Blocker (No further documentation required).       Informed Consent: Patient understands risks and agrees with Anesthesia plan.  Questions answered. Anesthesia consent signed with patient.  ASA Score: 2     Day of Surgery Review of History & Physical:    H&P update referred to the surgeon.         Ready For Surgery From Anesthesia Perspective.   intubation, easy mask grade 1

## 2018-12-10 NOTE — PRE ADMISSION SCREENING
Anesthesia Assessment: Preoperative EQUATION    Planned Procedure: Procedure(s) (LRB):  CYSTOSCOPY (N/A)  CYSTOSCOPY, WITH DIRECT VISION INTERNAL URETHROTOMY (N/A)  DILATION, URETHRA (N/A)  Requested Anesthesia Type:Monitor Anesthesia Care  Surgeon: Dewey Mann MD  Service: Urology  Known or anticipated Date of Surgery:12/19/2018    Surgeon notes: reviewed    Electronic QUestionnaire Assessment completed via nurse interview with patient.        No AQ      Triage considerations:     The patient has no apparent active cardiac condition (No unstable coronary Syndrome such as severe unstable angina or recent [<1 month] myocardial infarction, decompensated CHF, severe valvular   disease or significant arrhythmia)    Previous anesthesia records:GETA, MAC and No problems -HX of TMJ   12/27/17 cysto:   Airway/Jaw/Neck:  Airway Findings: Mouth Opening: Normal Tongue: Normal  General Airway Assessment: Adult  Mallampati: II  TM Distance: Normal, at least 6 cm            Last PCP note: 6-12 months ago   Subspecialty notes: Endocrinology, Gastroenterology, Kidney Transplant    Other important co-morbidities: S/P Kidney transplant d/t HTN 11/2016, HTN, GERD, HX anemia, Hyperparathyroidism, hypothyroidism     Tests already available:  Available tests,  within 3 months . 11/13/18 renal function, 11/6/18 CBC, tacro level. 2016 EKG and echo.            Instructions given. (See in Nurse's note)    Optimization:  Anesthesia Preop Clinic Assessment  Indicated-not required for this procedure      Plan:    Testing:  none       Consultation:will notify kidney transplant     Patient  has previously scheduled Medical Appointment: none    Navigation:            Consults scheduled.             Results will be tracked by Preop Clinic.

## 2018-12-18 ENCOUNTER — TELEPHONE (OUTPATIENT)
Dept: UROLOGY | Facility: CLINIC | Age: 68
End: 2018-12-18

## 2018-12-18 NOTE — TELEPHONE ENCOUNTER
Called pt to confirm 1:30pm arrival time for procedure. Gave pt NPO instructions and gave pt opportunity to ask questions. Pt verbalized understanding.

## 2018-12-19 ENCOUNTER — ANESTHESIA (OUTPATIENT)
Dept: SURGERY | Facility: HOSPITAL | Age: 68
End: 2018-12-19
Payer: MEDICARE

## 2018-12-19 ENCOUNTER — HOSPITAL ENCOUNTER (OUTPATIENT)
Facility: HOSPITAL | Age: 68
Discharge: HOME | End: 2018-12-19
Attending: UROLOGY | Admitting: UROLOGY
Payer: MEDICARE

## 2018-12-19 VITALS
RESPIRATION RATE: 12 BRPM | HEART RATE: 55 BPM | SYSTOLIC BLOOD PRESSURE: 135 MMHG | DIASTOLIC BLOOD PRESSURE: 81 MMHG | OXYGEN SATURATION: 99 % | BODY MASS INDEX: 21.98 KG/M2 | WEIGHT: 157 LBS | HEIGHT: 71 IN | TEMPERATURE: 98 F

## 2018-12-19 DIAGNOSIS — Z87.448 H/O URETHRAL STRICTURE: Primary | ICD-10-CM

## 2018-12-19 DIAGNOSIS — N32.3 BLADDER DIVERTICULUM: ICD-10-CM

## 2018-12-19 DIAGNOSIS — Z87.448 HISTORY OF URETHRAL STRICTURE: ICD-10-CM

## 2018-12-19 DIAGNOSIS — N39.0 RECURRENT UTI: ICD-10-CM

## 2018-12-19 PROCEDURE — 52276 CYSTOSCOPY AND TREATMENT: CPT | Mod: ,,, | Performed by: UROLOGY

## 2018-12-19 PROCEDURE — D9220A PRA ANESTHESIA: Mod: CRNA,,, | Performed by: NURSE ANESTHETIST, CERTIFIED REGISTERED

## 2018-12-19 PROCEDURE — 71000015 HC POSTOP RECOV 1ST HR: Performed by: UROLOGY

## 2018-12-19 PROCEDURE — 25000003 PHARM REV CODE 250: Performed by: NURSE ANESTHETIST, CERTIFIED REGISTERED

## 2018-12-19 PROCEDURE — 25000003 PHARM REV CODE 250: Performed by: STUDENT IN AN ORGANIZED HEALTH CARE EDUCATION/TRAINING PROGRAM

## 2018-12-19 PROCEDURE — 27201423 OPTIME MED/SURG SUP & DEVICES STERILE SUPPLY: Performed by: UROLOGY

## 2018-12-19 PROCEDURE — D9220A PRA ANESTHESIA: Mod: ANES,,, | Performed by: ANESTHESIOLOGY

## 2018-12-19 PROCEDURE — 71000044 HC DOSC ROUTINE RECOVERY FIRST HOUR: Performed by: UROLOGY

## 2018-12-19 PROCEDURE — 36000706: Performed by: UROLOGY

## 2018-12-19 PROCEDURE — C1769 GUIDE WIRE: HCPCS | Performed by: UROLOGY

## 2018-12-19 PROCEDURE — 25000003 PHARM REV CODE 250: Performed by: UROLOGY

## 2018-12-19 PROCEDURE — 25000003 PHARM REV CODE 250

## 2018-12-19 PROCEDURE — 63600175 PHARM REV CODE 636 W HCPCS: Performed by: STUDENT IN AN ORGANIZED HEALTH CARE EDUCATION/TRAINING PROGRAM

## 2018-12-19 PROCEDURE — 36000707: Performed by: UROLOGY

## 2018-12-19 PROCEDURE — 37000009 HC ANESTHESIA EA ADD 15 MINS: Performed by: UROLOGY

## 2018-12-19 PROCEDURE — 37000008 HC ANESTHESIA 1ST 15 MINUTES: Performed by: UROLOGY

## 2018-12-19 PROCEDURE — 63600175 PHARM REV CODE 636 W HCPCS: Performed by: NURSE ANESTHETIST, CERTIFIED REGISTERED

## 2018-12-19 PROCEDURE — 63600175 PHARM REV CODE 636 W HCPCS

## 2018-12-19 RX ORDER — SODIUM CHLORIDE 9 MG/ML
INJECTION, SOLUTION INTRAVENOUS CONTINUOUS
Status: DISCONTINUED | OUTPATIENT
Start: 2018-12-19 | End: 2018-12-19 | Stop reason: HOSPADM

## 2018-12-19 RX ORDER — GLYCOPYRROLATE 0.2 MG/ML
INJECTION INTRAMUSCULAR; INTRAVENOUS
Status: DISCONTINUED | OUTPATIENT
Start: 2018-12-19 | End: 2018-12-19

## 2018-12-19 RX ORDER — VANCOMYCIN HCL IN 5 % DEXTROSE 1G/250ML
1000 PLASTIC BAG, INJECTION (ML) INTRAVENOUS
Status: COMPLETED | OUTPATIENT
Start: 2018-12-19 | End: 2018-12-19

## 2018-12-19 RX ORDER — MEPERIDINE HYDROCHLORIDE 25 MG/ML
12.5 INJECTION INTRAMUSCULAR; INTRAVENOUS; SUBCUTANEOUS EVERY 10 MIN PRN
Status: DISCONTINUED | OUTPATIENT
Start: 2018-12-19 | End: 2018-12-19 | Stop reason: HOSPADM

## 2018-12-19 RX ORDER — PROPOFOL 10 MG/ML
VIAL (ML) INTRAVENOUS
Status: DISCONTINUED | OUTPATIENT
Start: 2018-12-19 | End: 2018-12-19

## 2018-12-19 RX ORDER — KETAMINE HYDROCHLORIDE 10 MG/ML
INJECTION, SOLUTION INTRAMUSCULAR; INTRAVENOUS
Status: DISCONTINUED | OUTPATIENT
Start: 2018-12-19 | End: 2018-12-19

## 2018-12-19 RX ORDER — MIDAZOLAM HYDROCHLORIDE 1 MG/ML
INJECTION, SOLUTION INTRAMUSCULAR; INTRAVENOUS
Status: DISCONTINUED | OUTPATIENT
Start: 2018-12-19 | End: 2018-12-19

## 2018-12-19 RX ORDER — SODIUM CHLORIDE 0.9 % (FLUSH) 0.9 %
3 SYRINGE (ML) INJECTION
Status: DISCONTINUED | OUTPATIENT
Start: 2018-12-19 | End: 2018-12-19 | Stop reason: HOSPADM

## 2018-12-19 RX ORDER — VANCOMYCIN HCL IN 5 % DEXTROSE 1G/250ML
PLASTIC BAG, INJECTION (ML) INTRAVENOUS
Status: COMPLETED
Start: 2018-12-19 | End: 2018-12-19

## 2018-12-19 RX ORDER — LIDOCAINE HCL/PF 100 MG/5ML
SYRINGE (ML) INTRAVENOUS
Status: DISCONTINUED | OUTPATIENT
Start: 2018-12-19 | End: 2018-12-19

## 2018-12-19 RX ORDER — TRAMADOL HYDROCHLORIDE 50 MG/1
50 TABLET ORAL EVERY 4 HOURS PRN
Qty: 10 TABLET | Refills: 0 | Status: SHIPPED | OUTPATIENT
Start: 2018-12-19 | End: 2019-01-07 | Stop reason: ALTCHOICE

## 2018-12-19 RX ORDER — LIDOCAINE HYDROCHLORIDE 20 MG/ML
JELLY TOPICAL
Status: DISCONTINUED | OUTPATIENT
Start: 2018-12-19 | End: 2018-12-19 | Stop reason: HOSPADM

## 2018-12-19 RX ORDER — ONDANSETRON 2 MG/ML
INJECTION INTRAMUSCULAR; INTRAVENOUS
Status: DISCONTINUED | OUTPATIENT
Start: 2018-12-19 | End: 2018-12-19

## 2018-12-19 RX ORDER — HYDROMORPHONE HYDROCHLORIDE 1 MG/ML
0.2 INJECTION, SOLUTION INTRAMUSCULAR; INTRAVENOUS; SUBCUTANEOUS EVERY 5 MIN PRN
Status: DISCONTINUED | OUTPATIENT
Start: 2018-12-19 | End: 2018-12-19 | Stop reason: HOSPADM

## 2018-12-19 RX ORDER — FENTANYL CITRATE 50 UG/ML
INJECTION, SOLUTION INTRAMUSCULAR; INTRAVENOUS
Status: DISCONTINUED | OUTPATIENT
Start: 2018-12-19 | End: 2018-12-19

## 2018-12-19 RX ADMIN — ONDANSETRON 4 MG: 2 INJECTION INTRAMUSCULAR; INTRAVENOUS at 04:12

## 2018-12-19 RX ADMIN — FENTANYL CITRATE 50 MCG: 50 INJECTION, SOLUTION INTRAMUSCULAR; INTRAVENOUS at 03:12

## 2018-12-19 RX ADMIN — GLYCOPYRROLATE 0.2 MG: 0.2 INJECTION, SOLUTION INTRAMUSCULAR; INTRAVENOUS at 04:12

## 2018-12-19 RX ADMIN — GENTAMICIN SULFATE 160 MG: 40 INJECTION, SOLUTION INTRAMUSCULAR; INTRAVENOUS at 03:12

## 2018-12-19 RX ADMIN — LIDOCAINE HYDROCHLORIDE 80 MG: 20 INJECTION, SOLUTION INTRAVENOUS at 03:12

## 2018-12-19 RX ADMIN — PROPOFOL 150 MG: 10 INJECTION, EMULSION INTRAVENOUS at 03:12

## 2018-12-19 RX ADMIN — VANCOMYCIN HYDROCHLORIDE 1000 MG: 1 INJECTION, POWDER, LYOPHILIZED, FOR SOLUTION INTRAVENOUS at 02:12

## 2018-12-19 RX ADMIN — MIDAZOLAM HYDROCHLORIDE 2 MG: 1 INJECTION, SOLUTION INTRAMUSCULAR; INTRAVENOUS at 03:12

## 2018-12-19 RX ADMIN — Medication 1000 MG: at 02:12

## 2018-12-19 RX ADMIN — KETAMINE HYDROCHLORIDE 25 MG: 10 INJECTION, SOLUTION INTRAMUSCULAR; INTRAVENOUS at 03:12

## 2018-12-19 RX ADMIN — SODIUM CHLORIDE: 0.9 INJECTION, SOLUTION INTRAVENOUS at 02:12

## 2018-12-19 NOTE — ANESTHESIA POSTPROCEDURE EVALUATION
"Anesthesia Post Evaluation    Patient: Alin Burkett    Procedure(s) Performed: Procedure(s) (LRB):  CYSTOSCOPY (N/A)  CYSTOSCOPY, WITH DIRECT VISION INTERNAL URETHROTOMY (N/A)  DILATION, URETHRA (N/A)    Final Anesthesia Type: general  Patient location during evaluation: PACU  Patient participation: Yes- Able to Participate  Level of consciousness: awake and alert and oriented  Post-procedure vital signs: reviewed and stable  Pain management: adequate  Airway patency: patent  PONV status at discharge: No PONV  Anesthetic complications: no      Cardiovascular status: stable  Respiratory status: unassisted, spontaneous ventilation and room air  Hydration status: euvolemic  Follow-up not needed.        Visit Vitals  /76   Pulse 62   Temp 36.7 °C (98 °F) (Temporal)   Resp 16   Ht 5' 11" (1.803 m)   Wt 71.2 kg (157 lb)   SpO2 96%   BMI 21.90 kg/m²       Pain/Raheem Score: Raheem Score: 9 (12/19/2018  4:35 PM)        "

## 2018-12-19 NOTE — INTERVAL H&P NOTE
The patient has been examined and the H&P has been reviewed:    I concur with the findings and no changes have occurred since H&P was written.    Anesthesia/Surgery risks, benefits and alternative options discussed and understood by patient/family.          Active Hospital Problems    Diagnosis  POA    History of urethral stricture [Z87.448]  Yes      Resolved Hospital Problems   No resolved problems to display.

## 2018-12-19 NOTE — OP NOTE
[facility]  Operative Note    Date: 2018    Pre-Op Diagnosis: urethral stricture  Patient Active Problem List    Diagnosis Date Noted    History of urethral stricture 2018    H/O urethral stricture 2018    Recurrent UTI 2017    Bladder diverticulum 2017    Urethral stricture 2017    Stage 3 chronic kidney disease 2017    Hypophosphatemia 01/15/2017    S/P kidney transplant     -donor kidney transplant 16     Prophylactic immunotherapy (transplant immunosuppression) 2016    Long-term use of immunosuppressant medication 2016    BPH (benign prostatic hyperplasia) 10/18/2016    Chest pain 2016    LOUISA (obstructive sleep apnea), CPAP refused 2015    LVH (left ventricular hypertrophy) due to hypertensive disease 2015    Arrhythmia, onset 2015    History of smoking 10-25 pack years, quit , 20 pack-years 2015    History of alcohol abuse, quit 2015    Abnormal ECG 2015    VPC (ventricular premature complex) 2015    Hypothyroidism 01/10/2014    Plantar warts 01/10/2014    Essential hypertension     Congenital absence of kidney     Anemia of chronic disease     Basal cell carcinoma - of the nose     History of AAA (abdominal aortic aneurysm) repair,      Secondary hyperparathyroidism, renal     Ectopic kidney 11/15/2012    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  Cystoscopy with DVIU  2.  Urethral dilation    Specimen(s): none    Staff Surgeon: Dewey Mann MD    Assistant Surgeon: Cullen Renee MD    Anesthesia: General LMA anesthesia    Indications: Alin Burkett is a 68 y.o. male with urethral stricture, recurrent UTIs, bladder diverticulum presenting for endoscopic management.     Findings:   - tight stricture in pendulous urethra about 3 cm in length, dilated from 10  - 22 Fr with Heymen dilators. Urethrotome used to open strictured area.  - Large mouth bladder diverticula with multiple outpouchings    Estimated Blood Loss: min    Drains: 22 Fr [place] tip catheter    Procedure in Detail:  After risks, benefits and possible complications of cystoscopy were explained, the patient elected to undergo the procedure and informed consent was obtained. All questions were answered in the ingrdi-operative area. The patient was transferred to the cystoscopy suite and placed in the supine position.  SCDs were applied and working.  Anesthesia was administered.  Once the patient was adequately sedated, he was placed in the dorsal lithotomy position and prepped and draped in the usual sterile fashion.  Time out was performed, ingrid-procedural antibiotics were confirmed.    22 Fr cystoscope was inserted into the urethra. Findings as above. Guide wire placed through the scope and scope removed. Dilated from 10-22 Fr with Heymen dilators.     A urethrotome in a 22 Fr sheath was introduced into the urethra. A stricture was able to accommodate the scope. The urethrotome was used to cut at the 12 o'clock position until bleeding was visualized.      The scope was then passed through the rest of the urethra and into the bladder. The right and left ureteral orifices were identified in the normal anatomic position and were seen effluxing clear urine.  Formal cystoscopy was performed which revealed findings as above.    A 22 Fr [place] tip catheter was placed.    The bladder was drained, and the patient was removed from lithotomy.     The patient tolerated the procedure well and was transferred to recovery in stable condition.    Disposition:  The patient will follow up with [facility] next week for fournier removal and to learn CIC. The patient was given prescriptions for tramadol.    Cullen Renee MD

## 2018-12-19 NOTE — ANESTHESIA RELEASE NOTE
"Anesthesia Release from PACU Note    Patient: Alin Burkett    Procedure(s) Performed: Procedure(s) (LRB):  CYSTOSCOPY (N/A)  CYSTOSCOPY, WITH DIRECT VISION INTERNAL URETHROTOMY (N/A)  DILATION, URETHRA (N/A)    Anesthesia type: GEN    Post pain: Adequate analgesia reported    Post assessment: no apparent anesthetic complications, tolerated procedure well and no evidence of recall    Post vital signs: /76   Pulse 62   Temp 36.7 °C (98 °F) (Temporal)   Resp 16   Ht 5' 11" (1.803 m)   Wt 71.2 kg (157 lb)   SpO2 96%   BMI 21.90 kg/m²     Level of consciousness: awake, alert and oriented    Nausea/Vomiting: no nausea/no vomiting    Complications: none    Airway Patency: patent    Respiratory: unassisted, spontaneous ventilation, room air    Cardiovascular: stable and blood pressure at baseline    Hydration: euvolemic    "

## 2018-12-19 NOTE — PLAN OF CARE
Discharge instructions given to patient and family. Educated patient on procedure and post op instructions, medications and when to follow up within designated time frame. Patient verbalized understanding. Patient denies pain and nausea at this time. PO fluids tolerated.

## 2018-12-19 NOTE — DISCHARGE INSTRUCTIONS
Barker Catheter Care    A Barker catheter is a rubber tube that is placed through the urethra (opening where urine comes out) and into the bladder. This helps drain urine from the bladder. There is a small balloon on the end of the tube that is inflated after insertion. This keeps the catheter from sliding out of the bladder.  A Barker catheter is used to treat urinary retention (unable to pass urine). It is also used when there is incontinence (loss of bladder control).  Home care  · Finish taking any prescribed antibiotic even if you are feeling better before then.  · It is important to keep bacteria from getting into the collection bag. Do not disconnect the catheter from the collection bag.  · Use a leg band to secure the drainage tube, so it does not pull on the catheter. Drain the collection bag when it becomes full using the drain spout at the bottom of the bag.  · Do not try to pull or remove your catheter. This will injure your urethra. It must be removed by your healthcare provider or nurse.  Follow-up care  Follow up with your healthcare provider as advised for repeat urine testing and catheter removal or replacement.  When to seek medical advice  Call your healthcare provider right away if any of these occur:  · Fever of 100.4ºF (38ºC) or higher, or as directed by your healthcare provider  · Bladder pain or fullness  · Abdominal swelling, nausea or vomiting, or back pain  · Blood or urine leakage around the catheter  · Bloody urine coming from the catheter (if a new symptom)  · Catheter falls out  · Catheter stops draining for 6 hours  · Weakness, dizziness, or fainting  Date Last Reviewed: 10/1/2016  © 9440-0222 [employer]. [address] [place] [address], [place] [address]. All rights reserved. This information is not intended as a substitute for professional medical care. Always follow your healthcare professional's instructions.

## 2018-12-19 NOTE — DISCHARGE SUMMARY
[facility]  Discharge Note  Short Stay    Admit Date: 2018    Discharge Date and Time: 2018 4:21 PM      Attending Physician: Dewey Mann MD     Discharge Provider: Cullen Renee MD    Diagnoses:  Active Hospital Problems    Diagnosis  POA    *History of urethral stricture [Z87.448]  Yes    H/O urethral stricture [Z87.448]  Yes    Bladder diverticulum [N32.3]  Yes    Recurrent UTI [N39.0]  Yes    Stage 3 chronic kidney disease [N18.3]  Yes     Chronic    Hypophosphatemia [E83.39]  Yes    -donor kidney transplant 16 [Z94.0]  Not Applicable     Induced w Campath 30 mg IV intraoperatively & SoluMedrol 875 mg total over 3 days.   Renal allograft biopsy 17 (DIVINE): 21 glomeruli, none globally sclerosed, <5% interstitial fibrosis, no ACR, c4d negative, AVR CCT Type 2 (V1 lesion); plan THYMO   Renal allograft biopsy 17 (follow-up after rejection treatment): 27 glomeruli, no AVR, no ACR, C4d negative, no microcirculation changes, 5-10% interstitial fibrosis; ?cast --> further studies pending      Long-term use of immunosuppressant medication [Z79.899]  Not Applicable    BPH (benign prostatic hyperplasia) [N40.0]  Yes     Chronic     s/p TURP with urethral stricture      LVH (left ventricular hypertrophy) due to hypertensive disease [I11.9]  Yes    LOUISA (obstructive sleep apnea), CPAP refused [G47.33]  Yes    Arrhythmia, onset  [I49.9]  Yes    History of smoking 10-25 pack years, quit , 20 pack-years [Z87.891]  Not Applicable    Abnormal ECG [R94.31]  Yes    Hypothyroidism [E03.9]  Yes    Essential hypertension [I10]  Yes    Congenital absence of kidney [Q60.2]  Not Applicable     left      Anemia of chronic disease [D63.8]  Yes    Basal cell carcinoma - of the nose [C44.91]  Yes    History of AAA (abdominal aortic aneurysm) repair,  [Z98.890]  Not Applicable    Secondary hyperparathyroidism, renal [N25.81]  Yes    Ectopic kidney [Q63.2]  Not  Applicable    Calcium nephrolithiasis [N20.0]  Yes    Adrenal adenoma [D35.00]  Yes      Resolved Hospital Problems   No resolved problems to display.       Discharged Condition: stable    Hospital Course: Patient was admitted for cysto DVIU and tolerated the procedure well with no complications. The patient was discharged home in good condition on the same day.       Final Diagnoses: Same as principal problem.    Disposition: Home or Self Care    Follow up/Patient Instructions:    Medications:  Reconciled Home Medications:   Current Discharge Medication List      START taking these medications    Details   traMADol (ULTRAM) 50 mg tablet Take 1 tablet (50 mg total) by mouth every 4 (four) hours as needed for Pain.  Qty: 10 tablet, Refills: 0         CONTINUE these medications which have NOT CHANGED    Details   calcitRIOL (ROCALTROL) 0.5 MCG Cap Take 1 capsule (0.5 mcg total) by mouth once daily.  Qty: 90 capsule, Refills: 0      famotidine (PEPCID) 20 MG tablet Take 1 tablet (20 mg total) by mouth every evening.  Qty: 90 tablet, Refills: 3      k phos di & mono-sod phos mono (K-PHOS-NEUTRAL) 250 mg Tab Take 3 tablets by mouth 2 (two) times daily.  Qty: 480 tablet, Refills: 11      ketoconazole (NIZORAL) 200 mg Tab Take 0.5 tablets (100 mg total) by mouth once daily.  Qty: 45 tablet, Refills: 3      levothyroxine (SYNTHROID) 100 MCG tablet Take 1 tablet (100 mcg total) by mouth once daily.  Qty: 90 tablet, Refills: 3      magnesium oxide (MAG-OX) 400 mg tablet Take 1 tablet (400 mg total) by mouth once daily.  Qty: 90 tablet, Refills: 3      metoprolol tartrate (LOPRESSOR) 25 MG tablet Take 0.5 tablets (12.5 mg total) by mouth 2 (two) times daily.  Qty: 90 tablet, Refills: 3      multivitamin (ONE DAILY MULTIVITAMIN) per tablet Take 1 tablet by mouth once daily.      mycophenolate (CELLCEPT) 250 mg Cap Take 3 capsules (750 mg total) by mouth 2 (two) times daily. Z94.0/Kidney Transplant on 11/26/16  Qty: 540  capsule, Refills: 3    Comments: Z94.0/Kidney Transplant on 11/26/16.      predniSONE (DELTASONE) 5 MG tablet Take 1 tablet (5 mg total) by mouth once daily. Z94.0/Kidney transplant on 11/26/2016  Qty: 90 tablet, Refills: 3      sodium bicarbonate 650 MG tablet Take 1 tablet (650 mg total) by mouth 2 (two) times daily.  Qty: 540 tablet, Refills: 3    Comments: DOSE INCREASE      tacrolimus (PROGRAF) 1 MG Cap Take 3 mg by mouth every morning and take 2 mg every evening  Qty: 150 capsule, Refills: 11    Comments: Z94.0/Kidney Transplant on 11/26/16           Discharge Procedure Orders   Call MD for:  temperature >100.4     Call MD for:  persistent nausea and vomiting or diarrhea     Call MD for:  severe uncontrolled pain     Call MD for:  difficulty breathing or increased cough     Call MD for:  severe persistent headache     Call MD for:  persistent dizziness, light-headedness, or visual disturbances     Call MD for:  increased confusion or weakness     No dressing needed     Activity as tolerated     Follow-up Information     Jeannine Cross PA-C On 12/26/2018.    Specialty:  Urology  Why:  s/p DVIU  Contact information:  [address] [facility] [address]  [phone]                   Discharge Procedure Orders (must include Diet, Follow-up, Activity):   Discharge Procedure Orders (must include Diet, Follow-up, Activity)   Call MD for:  temperature >100.4     Call MD for:  persistent nausea and vomiting or diarrhea     Call MD for:  severe uncontrolled pain     Call MD for:  difficulty breathing or increased cough     Call MD for:  severe persistent headache     Call MD for:  persistent dizziness, light-headedness, or visual disturbances     Call MD for:  increased confusion or weakness     No dressing needed     Activity as tolerated

## 2018-12-19 NOTE — TRANSFER OF CARE
"Anesthesia Transfer of Care Note    Patient: Alin Burkett    Procedure(s) Performed: Procedure(s) (LRB):  CYSTOSCOPY (N/A)  CYSTOSCOPY, WITH DIRECT VISION INTERNAL URETHROTOMY (N/A)  DILATION, URETHRA (N/A)    Patient location: PACU    Anesthesia Type: general    Transport from OR: Transported from OR on 6-10 L/min O2 by face mask with adequate spontaneous ventilation    Post pain: adequate analgesia    Post assessment: no apparent anesthetic complications and tolerated procedure well    Post vital signs: stable    Level of consciousness: awake, alert and oriented    Nausea/Vomiting: no nausea/vomiting    Complications: none    Transfer of care protocol was followed      Last vitals:   Visit Vitals  /74 (BP Location: Right arm, Patient Position: Lying)   Pulse 81   Temp 36.8 °C (98.2 °F) (Oral)   Resp 20   Ht 5' 11" (1.803 m)   Wt 71.2 kg (157 lb)   SpO2 98%   BMI 21.90 kg/m²     "

## 2018-12-24 ENCOUNTER — DOCUMENTATION ONLY (OUTPATIENT)
Dept: FAMILY MEDICINE | Facility: CLINIC | Age: 68
End: 2018-12-24

## 2018-12-24 NOTE — PROGRESS NOTES
Pre-Visit Chart Review  For Appointment Scheduled on 01/07/2019    Health Maintenance Due   Topic Date Due    TETANUS VACCINE  01/21/1968

## 2018-12-26 ENCOUNTER — OFFICE VISIT (OUTPATIENT)
Dept: UROLOGY | Facility: CLINIC | Age: 68
End: 2018-12-26
Payer: MEDICARE

## 2018-12-26 VITALS
WEIGHT: 154.56 LBS | DIASTOLIC BLOOD PRESSURE: 78 MMHG | SYSTOLIC BLOOD PRESSURE: 138 MMHG | BODY MASS INDEX: 21.64 KG/M2 | HEART RATE: 68 BPM | HEIGHT: 71 IN

## 2018-12-26 DIAGNOSIS — N35.819 OTHER URETHRAL STRICTURE, MALE, UNSPECIFIED SITE: Primary | ICD-10-CM

## 2018-12-26 DIAGNOSIS — R33.9 INCOMPLETE BLADDER EMPTYING: ICD-10-CM

## 2018-12-26 PROCEDURE — 3075F SYST BP GE 130 - 139MM HG: CPT | Mod: S$GLB,,, | Performed by: PHYSICIAN ASSISTANT

## 2018-12-26 PROCEDURE — 3078F DIAST BP <80 MM HG: CPT | Mod: S$GLB,,, | Performed by: PHYSICIAN ASSISTANT

## 2018-12-26 PROCEDURE — 99213 OFFICE O/P EST LOW 20 MIN: CPT | Mod: S$GLB,,, | Performed by: PHYSICIAN ASSISTANT

## 2018-12-26 PROCEDURE — 1101F PT FALLS ASSESS-DOCD LE1/YR: CPT | Mod: S$GLB,,, | Performed by: PHYSICIAN ASSISTANT

## 2018-12-26 PROCEDURE — 99999 PR PBB SHADOW E&M-EST. PATIENT-LVL IV: CPT | Mod: PBBFAC,,, | Performed by: PHYSICIAN ASSISTANT

## 2018-12-26 NOTE — PROGRESS NOTES
CHIEF COMPLAINT:    Mr. Burkett is a 68 y.o. male presenting for fournier catheter removal.    PRESENTING ILLNESS:    Alin Burkett is a 68 y.o. male with a PMH of urethral stricture who presents for fournier catheter removal.    He had the following procedure on 12/20/18 with Dr. Mann:  Procedure(s) Performed:   1.  Cystoscopy with DVIU  2.  Urethral dilation   Findings:   - tight stricture in pendulous urethra about 3 cm in length, dilated from 10 - 22 Fr with Heymen dilators. Urethrotome used to open strictured area.  - Large mouth bladder diverticula with multiple outpouchings    He is here today for fournier catheter removal and to learn CIC.  His catheter has been draining well.  He reports some discomfort at the tip of his penis yesterday.  He has a history of recurrent UTIs.    His most recent urine cultures are proteus, enterococcus, and klebsiella.  His only symptom is small degree of burning before he urinates whenever his urine cultures are positive.    s/p cysto and urethral dilation for anterior urethral stricture on 12/17.  Bladder diverticulum was also seen on cysto.     He has a history of self-cath with 16 Fr catheter occasionally but has not does this in over 3 months.  His stream is good most of the time, but he reports occasional trickling. He also occasionally passes some debris that may be stones.     He had a kidney transplant in 11/ 2016       REVIEW OF SYSTEMS:    Constitutional: Negative for fever and chills.   HENT: Negative for hearing loss.   Eyes: Negative for visual disturbance.   Respiratory: Negative for shortness of breath.   Cardiovascular: Negative for chest pain.   Gastrointestinal: Negative for vomiting, and constipation.   Genitourinary: See HPI  Neurological: Negative for dizziness.   Hematological: Does not bruise/bleed easily.   Psychiatric/Behavioral: Negative for confusion.       PATIENT HISTORY:    Past Medical History:   Diagnosis Date    Acidosis     Adrenal adenoma      Anemia associated with chronic renal failure     Arrhythmia, onset 2015    Awaiting organ transplant status 2013    Basal cell carcinoma 2012    left nasal tip    Blood type B+ 2013    Cancer     Celiac artery dissection     Chronic diarrhea     Chronic urethral stricture     Congenital absence of kidney     left    -donor kidney transplant 16     Induced w Campath 30 mg IV intraoperatively & SoluMedrol 875 mg total over 3 days.  Renal allograft biopsy 17 (DIVINE): 21 glomeruli, none globally sclerosed, <5% interstitial fibrosis, no ACR, c4d negative, AVR CCT Type 2 (V1 lesion); plan THYMO     Dissecting aortic aneurysm (any part), abdominal     Diverticulosis     Encounter for blood transfusion     ESRD (end stage renal disease) 2010    H/O: urethral stricture     History of AAA (abdominal aortic aneurysm) repair     Hypertension     Hypokalemia     Hypothyroidism 1/10/2014    Inguinal hernia bilateral, non-recurrent     Kidney stones     Organ transplant candidate 2013    Plantar warts 1/10/2014    Recurrent UTI 2017    S/P kidney transplant     Secondary hyperparathyroidism, renal     Thyroid disease        Past Surgical History:   Procedure Laterality Date    ABDOMINAL SURGERY      exploratory lapatomy x 2    AORTA - SUPERIOR MESENTERIC ARTERY BYPASS GRAFT      BIOPSY - Ultrasound guided N/A 2017    [facility] 2ND FLR    BLADDER NECK RECONSTRUCTION      BLADDER SURGERY      COLONOSCOPY  10/10/2013    Dr. Gutierrez, repeat in 5 years    COLONOSCOPY N/A 10/10/2013    [facility] ENDO    CYSTOSCOPY      CYSTOSCOPY N/A 2018    [facility] by [facility] at [facility] OR 1ST FLR    CYSTOSCOPY N/A 2017    [facility] by [facility] at [facility] OR 1ST FLR    CYSTOSCOPY N/A 2017    [facility] at [facility] OR 1ST FLR    CYSTOSCOPY,  WITH DIRECT VISION INTERNAL URETHROTOMY N/A 2018    [facility]formed [facility] at [facility] OR 1ST FLR    DILATION, URETHRA N/A 2018    Performed by [facility] at [facility] OR 1ST FLR    GASTROJEJUNOSTOMY      HEMORRHOID SURGERY      HERNIA REPAIR      KIDNEY TRANSPLANT      LITHOTRIPSY      PERCUTANEOUS NEPHROLITHOTRIPSY      right  ESWL  10/31/12    right ESWL  12    TRANSPLANT-KIDNEY N/A 2016    Performed by Andrew Lopez Jr., MD at [facility] OR 2ND FLR    URETHROGRAM-RETROGRADE N/A 2017    Performed by [facility]wey [facility] at [facility] OR 1ST FLR    URETHROTOMY-DIRECT VISUAL INTERNAL (DVIU) N/A 2017    Performed by [facility] at [facility] OR 1ST FLR    URETHROTOMY-DIRECT VISUAL INTERNAL (DVIU) N/A 2017    [facility]formed by [facility] at [facility] OR 1ST FLR       Family History   Problem Relation Age of Onset    Diabetes Mother     Cancer Brother         thyroid cancer    Stroke Maternal Aunt     Kidney disease Paternal Uncle     Kidney disease Cousin     Melanoma Neg Hx     Psoriasis Neg Hx     Lupus Neg Hx     Eczema Neg Hx     Colon cancer Neg Hx     Colon polyps Neg Hx     Crohn's disease Neg Hx     Ulcerative colitis Neg Hx     Celiac disease Neg Hx        Social History     Socioeconomic History    Marital status: Significant Other     Spouse name: Not on file    Number of children: Not on file    Years of education: Not on file    Highest education level: Not on file   Social Needs    Financial resource strain: Not on file    Food insecurity - worry: Not on file    Food insecurity - inability: Not on file    Transportation needs - medical: Not on file    Transportation needs - non-medical: Not on file   Occupational History     Employer: Disabled   Tobacco Use    Smoking status: Former Smoker     Years: 40.00     Types: Cigarettes     Last attempt to quit: 2010     Years since quittin.5    Smokeless tobacco: Never Used    Substance and Sexual Activity    Alcohol use: No     Comment: stopped ETOH in 2010    Drug use: No     Comment: THC in youth    Sexual activity: Yes     Partners: Female     Birth control/protection: None   Other Topics Concern    Not on file   Social History Narrative    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    Medications:    Current Outpatient Medications:     calcitRIOL (ROCALTROL) 0.5 MCG Cap, Take 1 capsule (0.5 mcg total) by mouth once daily., Disp: 90 capsule, Rfl: 0    famotidine (PEPCID) 20 MG tablet, Take 1 tablet (20 mg total) by mouth every evening., Disp: 90 tablet, Rfl: 3    k phos di & mono-sod phos mono (K-PHOS-NEUTRAL) 250 mg Tab, Take 3 tablets by mouth 2 (two) times daily., Disp: 480 tablet, Rfl: 11    ketoconazole (NIZORAL) 200 mg Tab, Take 0.5 tablets (100 mg total) by mouth once daily., Disp: 45 tablet, Rfl: 3    levothyroxine (SYNTHROID) 100 MCG tablet, Take 1 tablet (100 mcg total) by mouth once daily., Disp: 90 tablet, Rfl: 3    magnesium oxide (MAG-OX) 400 mg tablet, Take 1 tablet (400 mg total) by mouth once daily., Disp: 90 tablet, Rfl: 3    metoprolol tartrate (LOPRESSOR) 25 MG tablet, Take 0.5 tablets (12.5 mg total) by mouth 2 (two) times daily., Disp: 90 tablet, Rfl: 3    multivitamin (ONE DAILY MULTIVITAMIN) per tablet, Take 1 tablet by mouth once daily., Disp: , Rfl:     mycophenolate (CELLCEPT) 250 mg Cap, Take 3 capsules (750 mg total) by mouth 2 (two) times daily. Z94.0/Kidney Transplant on 11/26/16, Disp: 540 capsule, Rfl: 3    predniSONE (DELTASONE) 5 MG tablet, Take 1 tablet (5 mg total) by mouth once daily. Z94.0/Kidney transplant on 11/26/2016, Disp: 90 tablet, Rfl: 3    sodium bicarbonate 650 MG tablet, Take 1 tablet (650 mg total) by mouth 2 (two) times daily., Disp: 540 tablet, Rfl: 3    tacrolimus (PROGRAF) 1 MG Cap, Take 3 mg by mouth every morning and take 2 mg every evening, Disp: 150  capsule, Rfl: 11    traMADol (ULTRAM) 50 mg tablet, Take 1 tablet (50 mg total) by mouth every 4 (four) hours as needed for Pain., Disp: 10 tablet, Rfl: 0    PHYSICAL EXAMINATION:    Constitutional: He appears well-developed and well-nourished.  He is in no apparent distress.    Eyes: No scleral icterus noted bilaterally. No discharge bilaterally.    Nose: No rhinorrhea    Cardiovascular: Normal rate.  No pitting edema noted in lower extremities bilaterally    Pulmonary/Chest: Effort normal. No respiratory distress.     Abdominal:  He exhibits no distension.  There is no CVA tenderness.     Lymphadenopathy:        Right: No supraclavicular adenopathy present.        Left: No supraclavicular adenopathy present.     Neurological: He is alert and oriented to person, place, and time.     Skin: Skin is warm and dry.     Psych: Cooperative with normal affect.    Genitourinary: The penis is circumcised.     Physical Exam      LABS:    Lab Results   Component Value Date    PSA 0.41 10/18/2016    PSA 0.32 10/20/2015    PSA 0.45 11/04/2014       IMPRESSION:    Encounter Diagnoses   Name Primary?    Other urethral stricture, male, unspecified site Yes    Incomplete bladder emptying          PLAN:    10cc was removed to deflate the balloon and the fournier catheter was removed by me.  Patient demonstrated the ability to perform CIC in office.  Patient instructed to perform CIC twice a day using a 16fr catheter indefinitely due to incomplete bladder emptying.      Follow up with Dr. Mann in 3 months.        Jeannine Cross PA-C

## 2019-01-01 ENCOUNTER — TELEPHONE (OUTPATIENT)
Dept: RADIOLOGY | Facility: HOSPITAL | Age: 69
End: 2019-01-01

## 2019-01-01 ENCOUNTER — HOSPITAL ENCOUNTER (OUTPATIENT)
Dept: RADIOLOGY | Facility: HOSPITAL | Age: 69
Discharge: HOME | End: 2019-11-25
Attending: STUDENT IN AN ORGANIZED HEALTH CARE EDUCATION/TRAINING PROGRAM
Payer: MEDICARE

## 2019-01-01 ENCOUNTER — OFFICE VISIT (OUTPATIENT)
Dept: TRANSPLANT | Facility: CLINIC | Age: 69
End: 2019-01-01
Payer: MEDICARE

## 2019-01-01 ENCOUNTER — OFFICE VISIT (OUTPATIENT)
Dept: UROLOGY | Facility: CLINIC | Age: 69
End: 2019-01-01
Payer: MEDICARE

## 2019-01-01 VITALS
HEIGHT: 71 IN | OXYGEN SATURATION: 100 % | SYSTOLIC BLOOD PRESSURE: 114 MMHG | HEART RATE: 69 BPM | WEIGHT: 152.75 LBS | RESPIRATION RATE: 18 BRPM | BODY MASS INDEX: 21.39 KG/M2 | TEMPERATURE: 98 F | DIASTOLIC BLOOD PRESSURE: 84 MMHG

## 2019-01-01 VITALS
BODY MASS INDEX: 21.3 KG/M2 | WEIGHT: 152.13 LBS | DIASTOLIC BLOOD PRESSURE: 76 MMHG | HEIGHT: 71 IN | HEART RATE: 66 BPM | SYSTOLIC BLOOD PRESSURE: 120 MMHG

## 2019-01-01 DIAGNOSIS — N25.81 SECONDARY HYPERPARATHYROIDISM, RENAL: Chronic | ICD-10-CM

## 2019-01-01 DIAGNOSIS — T83.511A URINARY TRACT INFECTION ASSOCIATED WITH INDWELLING URETHRAL CATHETER, INITIAL ENCOUNTER: ICD-10-CM

## 2019-01-01 DIAGNOSIS — N39.0 URINARY TRACT INFECTION ASSOCIATED WITH INDWELLING URETHRAL CATHETER, INITIAL ENCOUNTER: ICD-10-CM

## 2019-01-01 DIAGNOSIS — N99.114 POSTPROCEDURAL STRICTURE OF ANTERIOR URETHRA: ICD-10-CM

## 2019-01-01 DIAGNOSIS — N99.114 POSTPROCEDURAL STRICTURE OF ANTERIOR URETHRA: Primary | ICD-10-CM

## 2019-01-01 DIAGNOSIS — Z79.60 LONG-TERM USE OF IMMUNOSUPPRESSANT MEDICATION: Primary | ICD-10-CM

## 2019-01-01 DIAGNOSIS — Z94.0 DECEASED-DONOR KIDNEY TRANSPLANT: ICD-10-CM

## 2019-01-01 DIAGNOSIS — I10 ESSENTIAL HYPERTENSION: Chronic | ICD-10-CM

## 2019-01-01 DIAGNOSIS — N18.30 STAGE 3 CHRONIC KIDNEY DISEASE: Chronic | ICD-10-CM

## 2019-01-01 PROCEDURE — 3079F DIAST BP 80-89 MM HG: CPT | Mod: S$GLB,,, | Performed by: INTERNAL MEDICINE

## 2019-01-01 PROCEDURE — 99999 PR PBB SHADOW E&M-EST. PATIENT-LVL IV: ICD-10-PCS | Mod: PBBFAC,,, | Performed by: UROLOGY

## 2019-01-01 PROCEDURE — 99499 NO LOS: ICD-10-PCS | Mod: S$GLB,,, | Performed by: UROLOGY

## 2019-01-01 PROCEDURE — 1101F PR PT FALLS ASSESS DOC 0-1 FALLS W/OUT INJ PAST YR: ICD-10-PCS | Mod: S$GLB,,, | Performed by: INTERNAL MEDICINE

## 2019-01-01 PROCEDURE — 25500020 PHARM REV CODE 255: Performed by: STUDENT IN AN ORGANIZED HEALTH CARE EDUCATION/TRAINING PROGRAM

## 2019-01-01 PROCEDURE — 99215 OFFICE O/P EST HI 40 MIN: CPT | Mod: S$GLB,,, | Performed by: INTERNAL MEDICINE

## 2019-01-01 PROCEDURE — 99215 PR OFFICE/OUTPT VISIT, EST, LEVL V, 40-54 MIN: ICD-10-PCS | Mod: S$GLB,,, | Performed by: INTERNAL MEDICINE

## 2019-01-01 PROCEDURE — 99999 PR PBB SHADOW E&M-EST. PATIENT-LVL IV: CPT | Mod: PBBFAC,,, | Performed by: INTERNAL MEDICINE

## 2019-01-01 PROCEDURE — 3079F PR MOST RECENT DIASTOLIC BLOOD PRESSURE 80-89 MM HG: ICD-10-PCS | Mod: S$GLB,,, | Performed by: INTERNAL MEDICINE

## 2019-01-01 PROCEDURE — 3074F SYST BP LT 130 MM HG: CPT | Mod: S$GLB,,, | Performed by: INTERNAL MEDICINE

## 2019-01-01 PROCEDURE — 74450 X-RAY URETHRA/BLADDER: CPT | Mod: TC

## 2019-01-01 PROCEDURE — 99999 PR PBB SHADOW E&M-EST. PATIENT-LVL IV: ICD-10-PCS | Mod: PBBFAC,,, | Performed by: INTERNAL MEDICINE

## 2019-01-01 PROCEDURE — 99999 PR PBB SHADOW E&M-EST. PATIENT-LVL IV: CPT | Mod: PBBFAC,,, | Performed by: UROLOGY

## 2019-01-01 PROCEDURE — 25000003 PHARM REV CODE 250: Performed by: STUDENT IN AN ORGANIZED HEALTH CARE EDUCATION/TRAINING PROGRAM

## 2019-01-01 PROCEDURE — 99499 UNLISTED E&M SERVICE: CPT | Mod: S$GLB,,, | Performed by: UROLOGY

## 2019-01-01 PROCEDURE — 74450 FL URETHROGRAM RETROGRADE: ICD-10-PCS | Mod: 26,,, | Performed by: RADIOLOGY

## 2019-01-01 PROCEDURE — 51610 INJECTION FOR BLADDER X-RAY: CPT | Mod: ,,, | Performed by: RADIOLOGY

## 2019-01-01 PROCEDURE — 3074F PR MOST RECENT SYSTOLIC BLOOD PRESSURE < 130 MM HG: ICD-10-PCS | Mod: S$GLB,,, | Performed by: INTERNAL MEDICINE

## 2019-01-01 PROCEDURE — 74450 X-RAY URETHRA/BLADDER: CPT | Mod: 26,,, | Performed by: RADIOLOGY

## 2019-01-01 PROCEDURE — 1159F PR MEDICATION LIST DOCUMENTED IN MEDICAL RECORD: ICD-10-PCS | Mod: S$GLB,,, | Performed by: INTERNAL MEDICINE

## 2019-01-01 PROCEDURE — 1126F AMNT PAIN NOTED NONE PRSNT: CPT | Mod: S$GLB,,, | Performed by: INTERNAL MEDICINE

## 2019-01-01 PROCEDURE — 1101F PT FALLS ASSESS-DOCD LE1/YR: CPT | Mod: S$GLB,,, | Performed by: INTERNAL MEDICINE

## 2019-01-01 PROCEDURE — 1126F PR PAIN SEVERITY QUANTIFIED, NO PAIN PRESENT: ICD-10-PCS | Mod: S$GLB,,, | Performed by: INTERNAL MEDICINE

## 2019-01-01 PROCEDURE — 1159F MED LIST DOCD IN RCRD: CPT | Mod: S$GLB,,, | Performed by: INTERNAL MEDICINE

## 2019-01-01 PROCEDURE — 51610 FL URETHROGRAM RETROGRADE: ICD-10-PCS | Mod: ,,, | Performed by: RADIOLOGY

## 2019-01-01 RX ORDER — AMOXICILLIN 500 MG/1
500 CAPSULE ORAL EVERY 12 HOURS
Qty: 6 CAPSULE | Refills: 0 | Status: SHIPPED | OUTPATIENT
Start: 2019-01-01 | End: 2020-01-01 | Stop reason: CLARIF

## 2019-01-01 RX ORDER — LIDOCAINE HYDROCHLORIDE 20 MG/ML
JELLY TOPICAL
Status: DISCONTINUED | OUTPATIENT
Start: 2019-01-01 | End: 2019-01-01 | Stop reason: HOSPADM

## 2019-01-01 RX ADMIN — IOTHALAMATE MEGLUMINE 40 ML: 172 INJECTION URETERAL at 01:11

## 2019-01-01 RX ADMIN — LIDOCAINE HYDROCHLORIDE: 20 JELLY TOPICAL at 01:11

## 2019-01-07 ENCOUNTER — OFFICE VISIT (OUTPATIENT)
Dept: FAMILY MEDICINE | Facility: CLINIC | Age: 69
End: 2019-01-07
Payer: MEDICARE

## 2019-01-07 VITALS
DIASTOLIC BLOOD PRESSURE: 50 MMHG | TEMPERATURE: 98 F | BODY MASS INDEX: 21.66 KG/M2 | SYSTOLIC BLOOD PRESSURE: 90 MMHG | WEIGHT: 154.75 LBS | HEART RATE: 64 BPM | HEIGHT: 71 IN

## 2019-01-07 DIAGNOSIS — I11.9 HYPERTENSIVE LEFT VENTRICULAR HYPERTROPHY, WITHOUT HEART FAILURE: ICD-10-CM

## 2019-01-07 DIAGNOSIS — I71.40 ABDOMINAL AORTIC ANEURYSM (AAA) WITHOUT RUPTURE: ICD-10-CM

## 2019-01-07 DIAGNOSIS — D69.6 THROMBOCYTOPENIA: ICD-10-CM

## 2019-01-07 DIAGNOSIS — N18.30 STAGE 3 CHRONIC KIDNEY DISEASE: Chronic | ICD-10-CM

## 2019-01-07 DIAGNOSIS — D63.8 ANEMIA OF CHRONIC DISEASE: ICD-10-CM

## 2019-01-07 DIAGNOSIS — D35.00 ADRENAL ADENOMA, UNSPECIFIED LATERALITY: ICD-10-CM

## 2019-01-07 DIAGNOSIS — F10.11 HISTORY OF ALCOHOL ABUSE: ICD-10-CM

## 2019-01-07 DIAGNOSIS — Z00.00 ENCOUNTER FOR PREVENTIVE HEALTH EXAMINATION: Primary | ICD-10-CM

## 2019-01-07 DIAGNOSIS — I49.9 CARDIAC ARRHYTHMIA, UNSPECIFIED CARDIAC ARRHYTHMIA TYPE: ICD-10-CM

## 2019-01-07 DIAGNOSIS — N39.0 RECURRENT UTI: Primary | ICD-10-CM

## 2019-01-07 DIAGNOSIS — N25.81 SECONDARY HYPERPARATHYROIDISM, RENAL: ICD-10-CM

## 2019-01-07 DIAGNOSIS — I10 ESSENTIAL HYPERTENSION: ICD-10-CM

## 2019-01-07 DIAGNOSIS — I70.0 ABDOMINAL AORTIC ATHEROSCLEROSIS: ICD-10-CM

## 2019-01-07 DIAGNOSIS — G47.33 OSA (OBSTRUCTIVE SLEEP APNEA): ICD-10-CM

## 2019-01-07 DIAGNOSIS — N40.0 BENIGN PROSTATIC HYPERPLASIA, UNSPECIFIED WHETHER LOWER URINARY TRACT SYMPTOMS PRESENT: Chronic | ICD-10-CM

## 2019-01-07 PROBLEM — D69.3 IMMUNE THROMBOCYTOPENIC PURPURA: Status: ACTIVE | Noted: 2019-01-07

## 2019-01-07 PROBLEM — Z87.448 HISTORY OF URETHRAL STRICTURE: Status: RESOLVED | Noted: 2018-12-19 | Resolved: 2019-01-07

## 2019-01-07 PROBLEM — Z87.448 H/O URETHRAL STRICTURE: Status: RESOLVED | Noted: 2018-11-27 | Resolved: 2019-01-07

## 2019-01-07 PROCEDURE — 3078F PR MOST RECENT DIASTOLIC BLOOD PRESSURE < 80 MM HG: ICD-10-PCS | Mod: S$GLB,,, | Performed by: PHYSICIAN ASSISTANT

## 2019-01-07 PROCEDURE — 99999 PR PBB SHADOW E&M-EST. PATIENT-LVL IV: ICD-10-PCS | Mod: PBBFAC,,, | Performed by: PHYSICIAN ASSISTANT

## 2019-01-07 PROCEDURE — 3074F PR MOST RECENT SYSTOLIC BLOOD PRESSURE < 130 MM HG: ICD-10-PCS | Mod: S$GLB,,, | Performed by: PHYSICIAN ASSISTANT

## 2019-01-07 PROCEDURE — G0439 PR MEDICARE ANNUAL WELLNESS SUBSEQUENT VISIT: ICD-10-PCS | Mod: S$GLB,,, | Performed by: PHYSICIAN ASSISTANT

## 2019-01-07 PROCEDURE — 3078F DIAST BP <80 MM HG: CPT | Mod: S$GLB,,, | Performed by: PHYSICIAN ASSISTANT

## 2019-01-07 PROCEDURE — G0439 PPPS, SUBSEQ VISIT: HCPCS | Mod: S$GLB,,, | Performed by: PHYSICIAN ASSISTANT

## 2019-01-07 PROCEDURE — 99999 PR PBB SHADOW E&M-EST. PATIENT-LVL IV: CPT | Mod: PBBFAC,,, | Performed by: PHYSICIAN ASSISTANT

## 2019-01-07 PROCEDURE — 3074F SYST BP LT 130 MM HG: CPT | Mod: S$GLB,,, | Performed by: PHYSICIAN ASSISTANT

## 2019-01-07 NOTE — PROGRESS NOTES
"Alin Burkett presented for a  Medicare AWV and comprehensive Health Risk Assessment today. The following components were reviewed and updated:    · Medical history  · Family History  · Social history  · Allergies and Current Medications  · Health Risk Assessment  · Health Maintenance  · Care Team     ** See Completed Assessments for Annual Wellness Visit within the encounter summary.**       The following assessments were completed:  · Living Situation  · CAGE  · Depression Screening  · Timed Get Up and Go  · Whisper Test  · Cognitive Function Screening  · Nutrition Screening  · ADL Screening  · PAQ Screening    Vitals:    01/07/19 0815   BP: (!) 90/50   BP Location: Right arm   Patient Position: Sitting   BP Method: Small (Automatic)   Pulse: 64   Temp: 98.1 °F (36.7 °C)   TempSrc: Oral   Weight: 70.2 kg (154 lb 12.2 oz)   Height: 5' 11" (1.803 m)     Body mass index is 21.59 kg/m².  Physical Exam   Constitutional: He is oriented to person, place, and time. He appears well-developed and well-nourished. No distress.   HENT:   Head: Normocephalic and atraumatic.   Neck: Normal range of motion. Neck supple. No JVD present.   Pulmonary/Chest: Effort normal.   Neurological: He is alert and oriented to person, place, and time.   Skin: He is not diaphoretic.                 Diagnoses and health risks identified today and associated recommendations/orders:    Alin was seen today for medicare awv.    Diagnoses and all orders for this visit:    Encounter for preventive health examination    Secondary hyperparathyroidism, renal  Comments:  stable, continue to monitor    Stage 3 chronic kidney disease  Comments:  stable, continue to monitor    Thrombocytopenia  Comments:  stable, continue to monitor    Abdominal aortic atherosclerosis  Comments:  stable, continue to monitor    Abdominal aortic aneurysm (AAA) without rupture  Comments:  stable, continue to monitor    Benign prostatic hyperplasia, unspecified whether lower " urinary tract symptoms present  Comments:  stable, followed by urology    Essential hypertension  Comments:  controlled, continue meds    Cardiac arrhythmia, unspecified cardiac arrhythmia type  Comments:  stable, continue regimen    LOUISA (obstructive sleep apnea), CPAP refused  Comments:  uncontrolled, encouraged cpap    Hypertensive left ventricular hypertrophy, without heart failure  Comments:  stable, continue to monitor    History of alcohol abuse, quit 2010  Comments:  controlled, in remission    Anemia of chronic disease  Comments:  stable, continue to monitor    Adrenal adenoma, unspecified laterality  Comments:  stable, continue to monitor        Provided Alin with a 5-10 year written screening schedule and personal prevention plan. Recommendations were developed using the USPSTF age appropriate recommendations. Education, counseling, and referrals were provided as needed. After Visit Summary printed and given to patient which includes a list of additional screenings\tests needed.    No Follow-up on file.    MATTHEW Lara     Patient readiness: acceptance and barriers:environmental    During the course of the visit the patient was educated and counseled about the following:     Hypertension:   Regular aerobic exercise.    Goals: Hypertension: Reduce Blood Pressure    Did patient meet goals/outcomes: Yes    The following self management tools provided: declined    Patient Instructions (the written plan) was given to the patient/family.     Time spent with patient: 55 minutes    Barriers to medications present (no )    Adverse reactions to current medications (no)    Over the counter medications reviewed (Yes)

## 2019-01-07 NOTE — Clinical Note
Primary Care Providers:Carmen Krueger MD, MD (General)Your patient was seen today for a HRA visit. Gap(s) in care (HEDIS gaps) have been identified during this visit that require additional testing and possible follow up.No orders of the defined types were placed in this encounter.These orders were placed using [facility] approved protocol and any results will be forwarded to your office for appropriate follow up. I have included a copy of my visit note; please review the note and feel free to contact me with any questions. Thank you for allowing me to participate in the care of your patients.MATTHEW Lara

## 2019-01-07 NOTE — PATIENT INSTRUCTIONS
Counseling and Referral of Other Preventative  (Italic type indicates deductible and co-insurance are waived)    Patient Name: Alin Burkett  Today's Date: 1/7/2019    Health Maintenance       Date Due Completion Date    High Dose Statin [birth year]/1972 ---    Pneumococcal Vaccine (65+ High/Highest Risk) (2 of 2 - PPSV23) 11/28/2018 10/3/2018    TETANUS VACCINE 01/06/2020 (Originally 1/21/1968) ---    Lipid Panel 02/20/2022 2/20/2017    Colonoscopy 10/10/2023 10/10/2013        No orders of the defined types were placed in this encounter.    The following information is provided to all patients.  This information is to help you find resources for any of the problems found today that may be affecting your health:                Living healthy guide: www.[facility].[place].gov      Understanding Diabetes: www.diabetes.org      Eating healthy: www.cdc.gov/healthyweight      CDC home safety checklist: www.cdc.gov/steadi/patient.html      Agency on Aging: www.goea.[place].[facility]      Alcoholics anonymous (AA): www.aa.org      Physical Activity: www.michael.nih.gov/vz6hjfy      Tobacco use: www.quitwithusla.org

## 2019-01-08 ENCOUNTER — APPOINTMENT (OUTPATIENT)
Dept: LAB | Facility: HOSPITAL | Age: 69
End: 2019-01-08
Attending: INTERNAL MEDICINE
Payer: MEDICARE

## 2019-01-10 ENCOUNTER — TELEPHONE (OUTPATIENT)
Dept: FAMILY MEDICINE | Facility: CLINIC | Age: 69
End: 2019-01-10

## 2019-01-10 RX ORDER — CIPROFLOXACIN 500 MG/1
500 TABLET ORAL 2 TIMES DAILY
Qty: 14 TABLET | Refills: 0 | Status: SHIPPED | OUTPATIENT
Start: 2019-01-10 | End: 2019-01-17

## 2019-01-10 NOTE — TELEPHONE ENCOUNTER
Covering for argenis. Preliminary urine culture demonstrates infection with a gram negative linda. Am starting antibiotics and argenis will notify him of the final culture results.

## 2019-01-12 NOTE — TELEPHONE ENCOUNTER
Please inform pt that his urcx thus far shows a UTI and I have sent ampicillin to [facility] in [place]. The ABX sensitivity has not resulted so I may be changing the ABX.     Thank you  
Pt informed.  
APER

## 2019-03-04 RX ORDER — CALCITRIOL 0.5 UG/1
0.5 CAPSULE ORAL DAILY
Qty: 90 CAPSULE | Refills: 3 | Status: SHIPPED | OUTPATIENT
Start: 2019-03-04 | End: 2019-05-08 | Stop reason: DRUGHIGH

## 2019-03-04 RX ORDER — LANOLIN ALCOHOL/MO/W.PET/CERES
400 CREAM (GRAM) TOPICAL DAILY
Qty: 90 TABLET | Refills: 3 | Status: SHIPPED | OUTPATIENT
Start: 2019-03-04

## 2019-03-04 RX ORDER — PREDNISONE 5 MG/1
5 TABLET ORAL DAILY
Qty: 90 TABLET | Refills: 3 | Status: SHIPPED | OUTPATIENT
Start: 2019-03-04 | End: 2020-01-01

## 2019-03-04 RX ORDER — LEVOTHYROXINE SODIUM 100 UG/1
100 TABLET ORAL DAILY
Qty: 90 TABLET | Refills: 3 | Status: SHIPPED | OUTPATIENT
Start: 2019-03-04 | End: 2020-01-01

## 2019-03-04 RX ORDER — METOPROLOL TARTRATE 25 MG/1
12.5 TABLET, FILM COATED ORAL 2 TIMES DAILY
Qty: 90 TABLET | Refills: 3 | Status: SHIPPED | OUTPATIENT
Start: 2019-03-04 | End: 2020-01-01

## 2019-03-04 RX ORDER — KETOCONAZOLE 200 MG/1
100 TABLET ORAL DAILY
Qty: 45 TABLET | Refills: 3 | Status: SHIPPED | OUTPATIENT
Start: 2019-03-04 | End: 2020-01-01

## 2019-03-04 RX ORDER — TACROLIMUS 1 MG/1
CAPSULE ORAL
Qty: 450 CAPSULE | Refills: 3 | Status: ON HOLD | OUTPATIENT
Start: 2019-03-04 | End: 2020-01-01 | Stop reason: SDUPTHER

## 2019-03-04 RX ORDER — MYCOPHENOLATE MOFETIL 250 MG/1
750 CAPSULE ORAL 2 TIMES DAILY
Qty: 540 CAPSULE | Refills: 3 | Status: ON HOLD | OUTPATIENT
Start: 2019-03-04 | End: 2019-08-25 | Stop reason: HOSPADM

## 2019-03-04 RX ORDER — FAMOTIDINE 20 MG/1
20 TABLET, FILM COATED ORAL NIGHTLY
Qty: 90 TABLET | Refills: 3 | Status: SHIPPED | OUTPATIENT
Start: 2019-03-04 | End: 2020-01-01

## 2019-05-03 DIAGNOSIS — Z94.0 KIDNEY REPLACED BY TRANSPLANT: Primary | ICD-10-CM

## 2019-05-03 DIAGNOSIS — N39.0 URINARY TRACT INFECTION WITHOUT HEMATURIA, SITE UNSPECIFIED: ICD-10-CM

## 2019-05-03 DIAGNOSIS — T86.10 COMPLICATION OF TRANSPLANTED KIDNEY, UNSPECIFIED COMPLICATION: ICD-10-CM

## 2019-05-07 ENCOUNTER — LAB VISIT (OUTPATIENT)
Dept: LAB | Facility: HOSPITAL | Age: 69
End: 2019-05-07
Attending: INTERNAL MEDICINE
Payer: MEDICARE

## 2019-05-07 DIAGNOSIS — T86.10 COMPLICATION OF TRANSPLANTED KIDNEY, UNSPECIFIED COMPLICATION: ICD-10-CM

## 2019-05-07 LAB
25(OH)D3+25(OH)D2 SERPL-MCNC: 34 NG/ML (ref 30–96)
PTH-INTACT SERPL-MCNC: 129 PG/ML (ref 9–77)

## 2019-05-07 PROCEDURE — 36415 COLL VENOUS BLD VENIPUNCTURE: CPT | Mod: PO

## 2019-05-07 PROCEDURE — 82306 VITAMIN D 25 HYDROXY: CPT

## 2019-05-07 PROCEDURE — 83970 ASSAY OF PARATHORMONE: CPT

## 2019-05-08 RX ORDER — CALCITRIOL 0.25 UG/1
0.25 CAPSULE ORAL DAILY
Qty: 30 CAPSULE | Refills: 11 | Status: ON HOLD | OUTPATIENT
Start: 2019-05-08 | End: 2019-11-05 | Stop reason: SDUPTHER

## 2019-05-08 NOTE — TELEPHONE ENCOUNTER
Notified patient of Dr. Mcclain's review of 5/7/19 lab results. Instructed patient to decrease calcitrol to 0.25mcg daily. Patient verbalized understanding.

## 2019-05-08 NOTE — TELEPHONE ENCOUNTER
----- Message from Chasidy Mcclain MD sent at 5/7/2019  2:25 PM CDT -----  Please decrease calcitriol to 0.25 mcg daily. Thank you!

## 2019-07-23 NOTE — TELEPHONE ENCOUNTER
Returned patient's call regarding KPN refill. Explained to patient that Transplant nephrologist prefers non transplant medications be refilled by his nephrologist. Patient reports he has not seen Dr. Allen since last year but he will call her office to schedule his annual follow-up appointment. In the meantime patient will have RFP drawn this Thursday. Once labs are reviewed by Dr. Mcclain she will determine if patient needs to continue taking KPN 3 tabs twice daily. Patient verbalized understanding.

## 2019-07-23 NOTE — TELEPHONE ENCOUNTER
----- Message from Courtney Leija sent at 7/23/2019 11:32 AM CDT -----  Rx Refill/Request     Is this a Refill or New Rx:  New  Rx Name and Strength:  k phos di & mono-sod phos mono (K-PHOS-NEUTRAL) 250 mg Tab  Preferred Pharmacy with phone number: [facility]  Communication Preference: [phone]  Additional Information: new rx needed

## 2019-07-25 ENCOUNTER — LAB VISIT (OUTPATIENT)
Dept: LAB | Facility: HOSPITAL | Age: 69
End: 2019-07-25
Attending: FAMILY MEDICINE
Payer: MEDICARE

## 2019-07-25 DIAGNOSIS — Z94.0 KIDNEY REPLACED BY TRANSPLANT: ICD-10-CM

## 2019-07-25 LAB
ALBUMIN SERPL BCP-MCNC: 3.5 G/DL (ref 3.5–5.2)
ANION GAP SERPL CALC-SCNC: 7 MMOL/L (ref 8–16)
BUN SERPL-MCNC: 28 MG/DL (ref 8–23)
CALCIUM SERPL-MCNC: 10 MG/DL (ref 8.7–10.5)
CHLORIDE SERPL-SCNC: 109 MMOL/L (ref 95–110)
CO2 SERPL-SCNC: 23 MMOL/L (ref 23–29)
CREAT SERPL-MCNC: 2.1 MG/DL (ref 0.5–1.4)
EST. GFR  (AFRICAN AMERICAN): 36.3 ML/MIN/1.73 M^2
EST. GFR  (NON AFRICAN AMERICAN): 31.4 ML/MIN/1.73 M^2
GLUCOSE SERPL-MCNC: 76 MG/DL (ref 70–110)
PHOSPHATE SERPL-MCNC: 2.6 MG/DL (ref 2.7–4.5)
POTASSIUM SERPL-SCNC: 3.5 MMOL/L (ref 3.5–5.1)
SODIUM SERPL-SCNC: 139 MMOL/L (ref 136–145)

## 2019-07-25 PROCEDURE — 36415 COLL VENOUS BLD VENIPUNCTURE: CPT | Mod: PO

## 2019-07-25 PROCEDURE — 80069 RENAL FUNCTION PANEL: CPT

## 2019-08-05 ENCOUNTER — PATIENT MESSAGE (OUTPATIENT)
Dept: TRANSPLANT | Facility: CLINIC | Age: 69
End: 2019-08-05

## 2019-08-05 ENCOUNTER — TELEPHONE (OUTPATIENT)
Dept: TRANSPLANT | Facility: CLINIC | Age: 69
End: 2019-08-05

## 2019-08-05 DIAGNOSIS — T86.10 COMPLICATION OF TRANSPLANTED KIDNEY, UNSPECIFIED COMPLICATION: Primary | ICD-10-CM

## 2019-08-05 NOTE — TELEPHONE ENCOUNTER
----- Message from Lisa Olea RN sent at 7/26/2019 12:06 AM CDT -----      ----- Message -----  From: Chasidy Mcclain MD  Sent: 7/25/2019   2:56 PM  To: [facility]    Cr 2.1: Please more water intake. Kidney US. Does he see us soon?

## 2019-08-05 NOTE — TELEPHONE ENCOUNTER
Notified patient of Dr. Mcclain's review of 7/25/19 lab results via [facility]. Instructed patient to increase water intake to 2.5-3L daily; he will need a kidney ultrasound due to elevated creatinine=2.1.

## 2019-08-08 ENCOUNTER — HOSPITAL ENCOUNTER (OUTPATIENT)
Dept: RADIOLOGY | Facility: HOSPITAL | Age: 69
Discharge: HOME | End: 2019-08-08
Attending: INTERNAL MEDICINE
Payer: MEDICARE

## 2019-08-08 DIAGNOSIS — T86.10 COMPLICATION OF TRANSPLANTED KIDNEY, UNSPECIFIED COMPLICATION: ICD-10-CM

## 2019-08-08 PROCEDURE — 76776 US TRANSPLANT KIDNEY WITH DOPPLER: ICD-10-PCS | Mod: 26,,, | Performed by: RADIOLOGY

## 2019-08-08 PROCEDURE — 76776 US EXAM K TRANSPL W/DOPPLER: CPT | Mod: TC

## 2019-08-08 PROCEDURE — 76776 US EXAM K TRANSPL W/DOPPLER: CPT | Mod: 26,,, | Performed by: RADIOLOGY

## 2019-08-16 ENCOUNTER — TELEPHONE (OUTPATIENT)
Dept: TRANSPLANT | Facility: CLINIC | Age: 69
End: 2019-08-16

## 2019-08-16 DIAGNOSIS — T86.10 COMPLICATION OF TRANSPLANTED KIDNEY, UNSPECIFIED COMPLICATION: Primary | ICD-10-CM

## 2019-08-16 NOTE — TELEPHONE ENCOUNTER
Reviewed patient's case with Dr. Mcclain. Patient c/o new pain around his kidney. Noticed this Tues 8/13/19. Informed patient Dr. Mcclain recommends a repeat ultrasound. Patient agrees to have US done on Tues 8/20/19.       Patient appointment with Nephrology was changed to 8/22/19

## 2019-08-16 NOTE — TELEPHONE ENCOUNTER
"Returned patient's call. Patient reports he's experienced a "few episodes of passing out associated with low B/P". Asked patient if he contacted his local nephrologist or PCP to be seen. Patient states he missed an opportunity to see his nephrologist today but he will try to get an appointment next week. In the meantime, advised patient to have someone bring him to nearest ER if he experience dizziness or passes out again.    Patient also c/o intermittent pain on the side of his kidney. Denies fever, symptoms of UTI, issues with urinating (no decrease urine output). Informed patient that coordinator will review with Dr. Mcclain & follow-up with her recommendations. Patient verbalized understanding.    "

## 2019-08-16 NOTE — TELEPHONE ENCOUNTER
----- Message from Nina Nichols sent at 8/16/2019 11:59 AM CDT -----  Contact: self  Needs Advice    Reason for call: Pt states that he left a message earlier this morning in regards to having some complication and need to speak to someone as soon as possible        Communication Preference:  Phone     Additional Information: n/a

## 2019-08-19 ENCOUNTER — HOSPITAL ENCOUNTER (INPATIENT)
Facility: HOSPITAL | Age: 69
LOS: 1 days | Discharge: ANOTHER HOSPITAL | DRG: 309 | End: 2019-08-20
Attending: EMERGENCY MEDICINE | Admitting: INTERNAL MEDICINE
Payer: MEDICARE

## 2019-08-19 DIAGNOSIS — R79.89 ELEVATED TROPONIN: ICD-10-CM

## 2019-08-19 DIAGNOSIS — R07.9 CHEST PAIN: Primary | ICD-10-CM

## 2019-08-19 DIAGNOSIS — I24.9 ACUTE CORONARY SYNDROME: ICD-10-CM

## 2019-08-19 DIAGNOSIS — I21.9 AMI (ACUTE MYOCARDIAL INFARCTION): ICD-10-CM

## 2019-08-19 DIAGNOSIS — I47.10 SVT (SUPRAVENTRICULAR TACHYCARDIA): ICD-10-CM

## 2019-08-19 LAB
BASOPHILS # BLD AUTO: 0.02 K/UL (ref 0–0.2)
BASOPHILS NFR BLD: 0.2 % (ref 0–1.9)
DIFFERENTIAL METHOD: ABNORMAL
EOSINOPHIL # BLD AUTO: 0.1 K/UL (ref 0–0.5)
EOSINOPHIL NFR BLD: 0.7 % (ref 0–8)
ERYTHROCYTE [DISTWIDTH] IN BLOOD BY AUTOMATED COUNT: 13.9 % (ref 11.5–14.5)
HCT VFR BLD AUTO: 40.8 % (ref 40–54)
HGB BLD-MCNC: 12.8 G/DL (ref 14–18)
IMM GRANULOCYTES # BLD AUTO: 0.06 K/UL (ref 0–0.04)
LYMPHOCYTES # BLD AUTO: 1.2 K/UL (ref 1–4.8)
LYMPHOCYTES NFR BLD: 13.9 % (ref 18–48)
MCH RBC QN AUTO: 29.7 PG (ref 27–31)
MCHC RBC AUTO-ENTMCNC: 31.4 G/DL (ref 32–36)
MCV RBC AUTO: 95 FL (ref 82–98)
MONOCYTES # BLD AUTO: 0.8 K/UL (ref 0.3–1)
MONOCYTES NFR BLD: 8.9 % (ref 4–15)
NEUTROPHILS # BLD AUTO: 6.7 K/UL (ref 1.8–7.7)
NEUTROPHILS NFR BLD: 75.6 % (ref 38–73)
NRBC BLD-RTO: 0 /100 WBC
PLATELET # BLD AUTO: 171 K/UL (ref 150–350)
PMV BLD AUTO: 11.3 FL (ref 9.2–12.9)
RBC # BLD AUTO: 4.31 M/UL (ref 4.6–6.2)
TROPONIN I SERPL DL<=0.01 NG/ML-MCNC: 1.16 NG/ML (ref 0–0.03)
WBC # BLD AUTO: 8.89 K/UL (ref 3.9–12.7)

## 2019-08-19 PROCEDURE — 83880 ASSAY OF NATRIURETIC PEPTIDE: CPT

## 2019-08-19 PROCEDURE — 12000002 HC ACUTE/MED SURGE SEMI-PRIVATE ROOM

## 2019-08-19 PROCEDURE — 93005 ELECTROCARDIOGRAM TRACING: CPT

## 2019-08-19 PROCEDURE — 25000003 PHARM REV CODE 250: Performed by: EMERGENCY MEDICINE

## 2019-08-19 PROCEDURE — 99291 CRITICAL CARE FIRST HOUR: CPT | Mod: 25

## 2019-08-19 PROCEDURE — 96361 HYDRATE IV INFUSION ADD-ON: CPT | Mod: 59

## 2019-08-19 PROCEDURE — 36415 COLL VENOUS BLD VENIPUNCTURE: CPT

## 2019-08-19 PROCEDURE — 84484 ASSAY OF TROPONIN QUANT: CPT

## 2019-08-19 PROCEDURE — 92960 CARDIOVERSION ELECTRIC EXT: CPT

## 2019-08-19 PROCEDURE — 63600175 PHARM REV CODE 636 W HCPCS: Performed by: EMERGENCY MEDICINE

## 2019-08-19 PROCEDURE — 80053 COMPREHEN METABOLIC PANEL: CPT

## 2019-08-19 PROCEDURE — 85025 COMPLETE CBC W/AUTO DIFF WBC: CPT

## 2019-08-19 RX ORDER — SODIUM CHLORIDE 9 MG/ML
1000 INJECTION, SOLUTION INTRAVENOUS
Status: COMPLETED | OUTPATIENT
Start: 2019-08-19 | End: 2019-08-19

## 2019-08-19 RX ORDER — ASPIRIN 325 MG
325 TABLET ORAL
Status: COMPLETED | OUTPATIENT
Start: 2019-08-19 | End: 2019-08-19

## 2019-08-19 RX ORDER — ADENOSINE 3 MG/ML
6 INJECTION, SOLUTION INTRAVENOUS
Status: COMPLETED | OUTPATIENT
Start: 2019-08-19 | End: 2019-08-19

## 2019-08-19 RX ADMIN — SODIUM CHLORIDE 1000 ML: 0.9 INJECTION, SOLUTION INTRAVENOUS at 11:08

## 2019-08-19 RX ADMIN — ASPIRIN 325 MG ORAL TABLET 325 MG: 325 PILL ORAL at 11:08

## 2019-08-19 RX ADMIN — ADENOSINE 6 MG: 3 INJECTION, SOLUTION INTRAVENOUS at 11:08

## 2019-08-20 ENCOUNTER — CLINICAL SUPPORT (OUTPATIENT)
Dept: CARDIOLOGY | Facility: HOSPITAL | Age: 69
DRG: 287 | End: 2019-08-20
Attending: FAMILY MEDICINE
Payer: MEDICARE

## 2019-08-20 ENCOUNTER — TELEPHONE (OUTPATIENT)
Dept: TRANSPLANT | Facility: CLINIC | Age: 69
End: 2019-08-20

## 2019-08-20 ENCOUNTER — HOSPITAL ENCOUNTER (INPATIENT)
Facility: HOSPITAL | Age: 69
LOS: 1 days | Discharge: OTHER INSTITUTION | DRG: 287 | End: 2019-08-21
Attending: FAMILY MEDICINE | Admitting: FAMILY MEDICINE
Payer: MEDICARE

## 2019-08-20 VITALS
BODY MASS INDEX: 21 KG/M2 | RESPIRATION RATE: 20 BRPM | WEIGHT: 150 LBS | SYSTOLIC BLOOD PRESSURE: 118 MMHG | HEIGHT: 71 IN | TEMPERATURE: 98 F | HEART RATE: 78 BPM | OXYGEN SATURATION: 95 % | DIASTOLIC BLOOD PRESSURE: 74 MMHG

## 2019-08-20 DIAGNOSIS — I47.10 SVT (SUPRAVENTRICULAR TACHYCARDIA): ICD-10-CM

## 2019-08-20 DIAGNOSIS — R79.89 ELEVATED TROPONIN: Primary | ICD-10-CM

## 2019-08-20 DIAGNOSIS — I21.9 AMI (ACUTE MYOCARDIAL INFARCTION): ICD-10-CM

## 2019-08-20 PROBLEM — I24.89 DEMAND ISCHEMIA: Status: ACTIVE | Noted: 2019-08-20

## 2019-08-20 LAB
ALBUMIN SERPL BCP-MCNC: 3.3 G/DL (ref 3.5–5.2)
ALP SERPL-CCNC: 55 U/L (ref 55–135)
ALT SERPL W/O P-5'-P-CCNC: 22 U/L (ref 10–44)
ANION GAP SERPL CALC-SCNC: 10 MMOL/L (ref 8–16)
AORTIC ROOT ANNULUS: 3.37 CM
AORTIC VALVE CUSP SEPERATION: 2.33 CM
AST SERPL-CCNC: 30 U/L (ref 10–40)
AV INDEX (PROSTH): 1.28
AV MEAN GRADIENT: 7 MMHG
AV PEAK GRADIENT: 15 MMHG
AV VALVE AREA: 4.48 CM2
AV VELOCITY RATIO: 0.99
BILIRUB SERPL-MCNC: 0.5 MG/DL (ref 0.1–1)
BNP SERPL-MCNC: 811 PG/ML (ref 0–99)
BSA FOR ECHO PROCEDURE: 1.85 M2
BUN SERPL-MCNC: 20 MG/DL (ref 8–23)
CALCIUM SERPL-MCNC: 10.4 MG/DL (ref 8.7–10.5)
CHLORIDE SERPL-SCNC: 107 MMOL/L (ref 95–110)
CHOLEST SERPL-MCNC: 108 MG/DL (ref 120–199)
CHOLEST/HDLC SERPL: 3.4 {RATIO} (ref 2–5)
CO2 SERPL-SCNC: 26 MMOL/L (ref 23–29)
CREAT SERPL-MCNC: 1.8 MG/DL (ref 0.5–1.4)
CV ECHO LV RWT: 1.09 CM
DOP CALC AO PEAK VEL: 1.94 M/S
DOP CALC AO VTI: 32.52 CM
DOP CALC LVOT AREA: 3.5 CM2
DOP CALC LVOT DIAMETER: 2.11 CM
DOP CALC LVOT PEAK VEL: 1.93 M/S
DOP CALC LVOT STROKE VOLUME: 145.74 CM3
DOP CALCLVOT PEAK VEL VTI: 41.7 CM
E WAVE DECELERATION TIME: 174.45 MSEC
E/A RATIO: 1.7
E/E' RATIO: 18.91 M/S
ECHO LV POSTERIOR WALL: 1.62 CM (ref 0.6–1.1)
EST. GFR  (AFRICAN AMERICAN): 44 ML/MIN/1.73 M^2
EST. GFR  (NON AFRICAN AMERICAN): 38 ML/MIN/1.73 M^2
FRACTIONAL SHORTENING: 32 % (ref 28–44)
GLUCOSE SERPL-MCNC: 100 MG/DL (ref 70–110)
HDLC SERPL-MCNC: 32 MG/DL (ref 40–75)
HDLC SERPL: 29.6 % (ref 20–50)
INR PPP: 1.1
INTERVENTRICULAR SEPTUM: 1.6 CM (ref 0.6–1.1)
IVRT: 0.11 MSEC
LDLC SERPL CALC-MCNC: 62.6 MG/DL (ref 63–159)
LEFT ATRIUM SIZE: 3.89 CM
LEFT INTERNAL DIMENSION IN SYSTOLE: 2.02 CM (ref 2.1–4)
LEFT VENTRICLE DIASTOLIC VOLUME INDEX: 18.41 ML/M2
LEFT VENTRICLE DIASTOLIC VOLUME: 34.36 ML
LEFT VENTRICLE MASS INDEX: 95 G/M2
LEFT VENTRICLE SYSTOLIC VOLUME INDEX: 7 ML/M2
LEFT VENTRICLE SYSTOLIC VOLUME: 13.13 ML
LEFT VENTRICULAR INTERNAL DIMENSION IN DIASTOLE: 2.98 CM (ref 3.5–6)
LEFT VENTRICULAR MASS: 176.87 G
LV LATERAL E/E' RATIO: 14.86 M/S
LV SEPTAL E/E' RATIO: 26 M/S
MV A" WAVE DURATION": 166 MSEC
MV PEAK A VEL: 0.61 M/S
MV PEAK E VEL: 1.04 M/S
NONHDLC SERPL-MCNC: 76 MG/DL
PISA TR MAX VEL: 3.07 M/S
POTASSIUM SERPL-SCNC: 3.3 MMOL/L (ref 3.5–5.1)
PROT SERPL-MCNC: 6.8 G/DL (ref 6–8.4)
PROTHROMBIN TIME: 13.3 SEC (ref 11.7–14)
PULM VEIN A" WAVE DURATION": 134 MSEC
PULM VEIN S/D RATIO: 1.51
PV PEAK D VEL: 0.45 M/S
PV PEAK S VEL: 0.68 M/S
PV PEAK VELOCITY: 1.86 CM/S
RA PRESSURE: 3 MMHG
RIGHT VENTRICULAR END-DIASTOLIC DIMENSION: 3.37 CM
SODIUM SERPL-SCNC: 143 MMOL/L (ref 136–145)
T4 FREE SERPL-MCNC: 0.83 NG/DL (ref 0.71–1.51)
TDI LATERAL: 0.07 M/S
TDI SEPTAL: 0.04 M/S
TDI: 0.06 M/S
TR MAX PG: 38 MMHG
TRICUSPID ANNULAR PLANE SYSTOLIC EXCURSION: 2.11 CM
TRIGL SERPL-MCNC: 67 MG/DL (ref 30–150)
TROPONIN I SERPL DL<=0.01 NG/ML-MCNC: 5.58 NG/ML (ref 0–0.03)
TROPONIN I SERPL DL<=0.01 NG/ML-MCNC: 6.92 NG/ML (ref 0–0.03)
TSH SERPL DL<=0.005 MIU/L-ACNC: 4.2 UIU/ML (ref 0.4–4)
TV REST PULMONARY ARTERY PRESSURE: 41 MMHG

## 2019-08-20 PROCEDURE — 85610 PROTHROMBIN TIME: CPT

## 2019-08-20 PROCEDURE — 25000003 PHARM REV CODE 250: Performed by: INTERNAL MEDICINE

## 2019-08-20 PROCEDURE — 63600175 PHARM REV CODE 636 W HCPCS: Performed by: SPECIALIST

## 2019-08-20 PROCEDURE — 25500020 PHARM REV CODE 255: Performed by: INTERNAL MEDICINE

## 2019-08-20 PROCEDURE — 25000003 PHARM REV CODE 250: Performed by: NURSE PRACTITIONER

## 2019-08-20 PROCEDURE — 93458 L HRT ARTERY/VENTRICLE ANGIO: CPT | Performed by: INTERNAL MEDICINE

## 2019-08-20 PROCEDURE — 25000003 PHARM REV CODE 250: Performed by: SPECIALIST

## 2019-08-20 PROCEDURE — 94761 N-INVAS EAR/PLS OXIMETRY MLT: CPT

## 2019-08-20 PROCEDURE — 11000001 HC ACUTE MED/SURG PRIVATE ROOM

## 2019-08-20 PROCEDURE — 93306 TTE W/DOPPLER COMPLETE: CPT

## 2019-08-20 PROCEDURE — 63600175 PHARM REV CODE 636 W HCPCS: Performed by: FAMILY MEDICINE

## 2019-08-20 PROCEDURE — 84484 ASSAY OF TROPONIN QUANT: CPT

## 2019-08-20 PROCEDURE — 25000003 PHARM REV CODE 250: Performed by: EMERGENCY MEDICINE

## 2019-08-20 PROCEDURE — C1769 GUIDE WIRE: HCPCS | Performed by: INTERNAL MEDICINE

## 2019-08-20 PROCEDURE — C1887 CATHETER, GUIDING: HCPCS | Performed by: INTERNAL MEDICINE

## 2019-08-20 PROCEDURE — 21400001 HC TELEMETRY ROOM

## 2019-08-20 PROCEDURE — 63600175 PHARM REV CODE 636 W HCPCS: Performed by: EMERGENCY MEDICINE

## 2019-08-20 PROCEDURE — 25000003 PHARM REV CODE 250: Performed by: FAMILY MEDICINE

## 2019-08-20 PROCEDURE — 63600175 PHARM REV CODE 636 W HCPCS: Performed by: INTERNAL MEDICINE

## 2019-08-20 PROCEDURE — C1725 CATH, TRANSLUMIN NON-LASER: HCPCS | Performed by: INTERNAL MEDICINE

## 2019-08-20 PROCEDURE — 63600175 PHARM REV CODE 636 W HCPCS: Performed by: NURSE PRACTITIONER

## 2019-08-20 PROCEDURE — 80061 LIPID PANEL: CPT

## 2019-08-20 PROCEDURE — 36415 COLL VENOUS BLD VENIPUNCTURE: CPT

## 2019-08-20 PROCEDURE — 84439 ASSAY OF FREE THYROXINE: CPT

## 2019-08-20 PROCEDURE — 84443 ASSAY THYROID STIM HORMONE: CPT

## 2019-08-20 PROCEDURE — C1894 INTRO/SHEATH, NON-LASER: HCPCS | Performed by: INTERNAL MEDICINE

## 2019-08-20 RX ORDER — LANOLIN ALCOHOL/MO/W.PET/CERES
400 CREAM (GRAM) TOPICAL DAILY
Status: DISCONTINUED | OUTPATIENT
Start: 2019-08-20 | End: 2019-08-20 | Stop reason: HOSPADM

## 2019-08-20 RX ORDER — AMOXICILLIN 250 MG
1 CAPSULE ORAL 2 TIMES DAILY
Status: DISCONTINUED | OUTPATIENT
Start: 2019-08-20 | End: 2019-08-20 | Stop reason: HOSPADM

## 2019-08-20 RX ORDER — SODIUM BICARBONATE 650 MG/1
650 TABLET ORAL 2 TIMES DAILY
Status: DISCONTINUED | OUTPATIENT
Start: 2019-08-20 | End: 2019-08-21 | Stop reason: HOSPADM

## 2019-08-20 RX ORDER — CALCITRIOL 0.25 UG/1
0.25 CAPSULE ORAL DAILY
Status: DISCONTINUED | OUTPATIENT
Start: 2019-08-21 | End: 2019-08-21 | Stop reason: HOSPADM

## 2019-08-20 RX ORDER — LANOLIN ALCOHOL/MO/W.PET/CERES
400 CREAM (GRAM) TOPICAL DAILY
Status: DISCONTINUED | OUTPATIENT
Start: 2019-08-21 | End: 2019-08-21 | Stop reason: HOSPADM

## 2019-08-20 RX ORDER — ENOXAPARIN SODIUM 100 MG/ML
1 INJECTION SUBCUTANEOUS
Status: COMPLETED | OUTPATIENT
Start: 2019-08-20 | End: 2019-08-20

## 2019-08-20 RX ORDER — SODIUM BICARBONATE 650 MG/1
650 TABLET ORAL 2 TIMES DAILY
Status: DISCONTINUED | OUTPATIENT
Start: 2019-08-20 | End: 2019-08-20 | Stop reason: HOSPADM

## 2019-08-20 RX ORDER — METOPROLOL TARTRATE 50 MG/1
50 TABLET ORAL 3 TIMES DAILY
Status: DISCONTINUED | OUTPATIENT
Start: 2019-08-20 | End: 2019-08-20 | Stop reason: HOSPADM

## 2019-08-20 RX ORDER — METOPROLOL TARTRATE 25 MG/1
12.5 TABLET ORAL 2 TIMES DAILY
Status: DISCONTINUED | OUTPATIENT
Start: 2019-08-20 | End: 2019-08-20 | Stop reason: DRUGHIGH

## 2019-08-20 RX ORDER — NITROGLYCERIN 0.4 MG/1
0.4 TABLET SUBLINGUAL EVERY 5 MIN PRN
Status: DISCONTINUED | OUTPATIENT
Start: 2019-08-20 | End: 2019-08-20 | Stop reason: HOSPADM

## 2019-08-20 RX ORDER — DOBUTAMINE HYDROCHLORIDE 200 MG/100ML
10 INJECTION INTRAVENOUS ONCE
Status: COMPLETED | OUTPATIENT
Start: 2019-08-20 | End: 2019-08-20

## 2019-08-20 RX ORDER — MORPHINE SULFATE 2 MG/ML
2 INJECTION, SOLUTION INTRAMUSCULAR; INTRAVENOUS
Status: DISCONTINUED | OUTPATIENT
Start: 2019-08-20 | End: 2019-08-20 | Stop reason: HOSPADM

## 2019-08-20 RX ORDER — SODIUM CHLORIDE 9 MG/ML
INJECTION, SOLUTION INTRAVENOUS CONTINUOUS
Status: DISCONTINUED | OUTPATIENT
Start: 2019-08-20 | End: 2019-08-20 | Stop reason: HOSPADM

## 2019-08-20 RX ORDER — POTASSIUM CHLORIDE 20 MEQ/1
40 TABLET, EXTENDED RELEASE ORAL 3 TIMES DAILY
Status: DISCONTINUED | OUTPATIENT
Start: 2019-08-20 | End: 2019-08-20

## 2019-08-20 RX ORDER — MORPHINE SULFATE 2 MG/ML
2 INJECTION, SOLUTION INTRAMUSCULAR; INTRAVENOUS ONCE
Status: COMPLETED | OUTPATIENT
Start: 2019-08-20 | End: 2019-08-20

## 2019-08-20 RX ORDER — ASPIRIN 325 MG
325 TABLET ORAL DAILY
Status: DISCONTINUED | OUTPATIENT
Start: 2019-08-20 | End: 2019-08-20 | Stop reason: HOSPADM

## 2019-08-20 RX ORDER — LIDOCAINE HYDROCHLORIDE 10 MG/ML
INJECTION, SOLUTION EPIDURAL; INFILTRATION; INTRACAUDAL; PERINEURAL
Status: DISCONTINUED | OUTPATIENT
Start: 2019-08-20 | End: 2019-08-21 | Stop reason: HOSPADM

## 2019-08-20 RX ORDER — FAMOTIDINE 20 MG/1
20 TABLET, FILM COATED ORAL NIGHTLY
Status: DISCONTINUED | OUTPATIENT
Start: 2019-08-20 | End: 2019-08-21 | Stop reason: HOSPADM

## 2019-08-20 RX ORDER — MYCOPHENOLATE MOFETIL 250 MG/1
750 CAPSULE ORAL 2 TIMES DAILY
Status: DISCONTINUED | OUTPATIENT
Start: 2019-08-20 | End: 2019-08-21 | Stop reason: HOSPADM

## 2019-08-20 RX ORDER — HYDROCODONE BITARTRATE AND ACETAMINOPHEN 5; 325 MG/1; MG/1
1 TABLET ORAL EVERY 8 HOURS PRN
Status: DISCONTINUED | OUTPATIENT
Start: 2019-08-20 | End: 2019-08-21 | Stop reason: HOSPADM

## 2019-08-20 RX ORDER — METOPROLOL TARTRATE 25 MG/1
25 TABLET, FILM COATED ORAL
Status: COMPLETED | OUTPATIENT
Start: 2019-08-20 | End: 2019-08-20

## 2019-08-20 RX ORDER — SODIUM CHLORIDE 450 MG/100ML
100 INJECTION, SOLUTION INTRAVENOUS CONTINUOUS
Status: ACTIVE | OUTPATIENT
Start: 2019-08-20 | End: 2019-08-20

## 2019-08-20 RX ORDER — PREDNISONE 5 MG/1
5 TABLET ORAL DAILY
Status: DISCONTINUED | OUTPATIENT
Start: 2019-08-20 | End: 2019-08-20 | Stop reason: HOSPADM

## 2019-08-20 RX ORDER — MYCOPHENOLATE MOFETIL 250 MG/1
750 CAPSULE ORAL 2 TIMES DAILY
Status: DISCONTINUED | OUTPATIENT
Start: 2019-08-20 | End: 2019-08-20 | Stop reason: HOSPADM

## 2019-08-20 RX ORDER — MORPHINE SULFATE 2 MG/ML
2 INJECTION, SOLUTION INTRAMUSCULAR; INTRAVENOUS ONCE
Status: DISCONTINUED | OUTPATIENT
Start: 2019-08-20 | End: 2019-08-20

## 2019-08-20 RX ORDER — CALCIUM CARBONATE 200(500)MG
1000 TABLET,CHEWABLE ORAL EVERY 6 HOURS PRN
Status: DISCONTINUED | OUTPATIENT
Start: 2019-08-20 | End: 2019-08-20 | Stop reason: HOSPADM

## 2019-08-20 RX ORDER — TACROLIMUS 1 MG/1
2 CAPSULE ORAL EVERY EVENING
Status: DISCONTINUED | OUTPATIENT
Start: 2019-08-20 | End: 2019-08-20 | Stop reason: HOSPADM

## 2019-08-20 RX ORDER — POTASSIUM CHLORIDE 20 MEQ/1
40 TABLET, EXTENDED RELEASE ORAL ONCE
Status: COMPLETED | OUTPATIENT
Start: 2019-08-20 | End: 2019-08-20

## 2019-08-20 RX ORDER — TACROLIMUS 1 MG/1
3 CAPSULE ORAL EVERY MORNING
Status: DISCONTINUED | OUTPATIENT
Start: 2019-08-20 | End: 2019-08-20 | Stop reason: HOSPADM

## 2019-08-20 RX ORDER — ATORVASTATIN CALCIUM 40 MG/1
80 TABLET, FILM COATED ORAL DAILY
Status: DISCONTINUED | OUTPATIENT
Start: 2019-08-20 | End: 2019-08-20 | Stop reason: HOSPADM

## 2019-08-20 RX ORDER — FENTANYL CITRATE 50 UG/ML
INJECTION, SOLUTION INTRAMUSCULAR; INTRAVENOUS
Status: DISCONTINUED | OUTPATIENT
Start: 2019-08-20 | End: 2019-08-21 | Stop reason: HOSPADM

## 2019-08-20 RX ORDER — PREDNISONE 5 MG/1
5 TABLET ORAL DAILY
Status: DISCONTINUED | OUTPATIENT
Start: 2019-08-21 | End: 2019-08-21 | Stop reason: HOSPADM

## 2019-08-20 RX ORDER — METOPROLOL TARTRATE 25 MG/1
12.5 TABLET ORAL 2 TIMES DAILY
Status: DISCONTINUED | OUTPATIENT
Start: 2019-08-20 | End: 2019-08-21 | Stop reason: HOSPADM

## 2019-08-20 RX ORDER — ASPIRIN 325 MG
325 TABLET ORAL
Status: DISCONTINUED | OUTPATIENT
Start: 2019-08-20 | End: 2019-08-20

## 2019-08-20 RX ORDER — FAMOTIDINE 20 MG/1
20 TABLET, FILM COATED ORAL NIGHTLY
Status: DISCONTINUED | OUTPATIENT
Start: 2019-08-20 | End: 2019-08-20 | Stop reason: HOSPADM

## 2019-08-20 RX ORDER — CALCITRIOL 0.25 UG/1
0.25 CAPSULE ORAL DAILY
Status: DISCONTINUED | OUTPATIENT
Start: 2019-08-20 | End: 2019-08-20 | Stop reason: HOSPADM

## 2019-08-20 RX ORDER — TACROLIMUS 1 MG/1
1 CAPSULE ORAL EVERY MORNING
Status: DISCONTINUED | OUTPATIENT
Start: 2019-08-21 | End: 2019-08-21 | Stop reason: HOSPADM

## 2019-08-20 RX ORDER — MIDAZOLAM HYDROCHLORIDE 1 MG/ML
INJECTION INTRAMUSCULAR; INTRAVENOUS
Status: DISCONTINUED | OUTPATIENT
Start: 2019-08-20 | End: 2019-08-21 | Stop reason: HOSPADM

## 2019-08-20 RX ORDER — ASPIRIN 325 MG
325 TABLET ORAL DAILY
Refills: 0 | Status: ON HOLD | COMMUNITY
Start: 2019-08-21 | End: 2019-08-25 | Stop reason: HOSPADM

## 2019-08-20 RX ORDER — LEVOTHYROXINE SODIUM 100 UG/1
100 TABLET ORAL DAILY
Status: DISCONTINUED | OUTPATIENT
Start: 2019-08-21 | End: 2019-08-21 | Stop reason: HOSPADM

## 2019-08-20 RX ORDER — LEVOTHYROXINE SODIUM 100 UG/1
100 TABLET ORAL
Status: DISCONTINUED | OUTPATIENT
Start: 2019-08-20 | End: 2019-08-20 | Stop reason: HOSPADM

## 2019-08-20 RX ORDER — SODIUM CHLORIDE AND POTASSIUM CHLORIDE 150; 450 MG/100ML; MG/100ML
INJECTION, SOLUTION INTRAVENOUS CONTINUOUS
Status: DISCONTINUED | OUTPATIENT
Start: 2019-08-20 | End: 2019-08-20

## 2019-08-20 RX ORDER — MORPHINE SULFATE 2 MG/ML
1 INJECTION, SOLUTION INTRAMUSCULAR; INTRAVENOUS EVERY 4 HOURS PRN
Status: DISCONTINUED | OUTPATIENT
Start: 2019-08-20 | End: 2019-08-21 | Stop reason: HOSPADM

## 2019-08-20 RX ORDER — ONDANSETRON 2 MG/ML
4 INJECTION INTRAMUSCULAR; INTRAVENOUS EVERY 4 HOURS PRN
Status: DISCONTINUED | OUTPATIENT
Start: 2019-08-20 | End: 2019-08-20 | Stop reason: HOSPADM

## 2019-08-20 RX ORDER — SODIUM CHLORIDE 0.9 % (FLUSH) 0.9 %
10 SYRINGE (ML) INJECTION
Status: DISCONTINUED | OUTPATIENT
Start: 2019-08-20 | End: 2019-08-20 | Stop reason: HOSPADM

## 2019-08-20 RX ORDER — ACETAMINOPHEN 325 MG/1
650 TABLET ORAL EVERY 4 HOURS PRN
Status: DISCONTINUED | OUTPATIENT
Start: 2019-08-20 | End: 2019-08-20 | Stop reason: HOSPADM

## 2019-08-20 RX ADMIN — METOPROLOL TARTRATE 50 MG: 50 TABLET ORAL at 09:08

## 2019-08-20 RX ADMIN — METOPROLOL TARTRATE 12.5 MG: 25 TABLET, FILM COATED ORAL at 08:08

## 2019-08-20 RX ADMIN — MYCOPHENOLATE MOFETIL 750 MG: 250 CAPSULE ORAL at 08:08

## 2019-08-20 RX ADMIN — ENOXAPARIN SODIUM 70 MG: 100 INJECTION SUBCUTANEOUS at 02:08

## 2019-08-20 RX ADMIN — DOBUTAMINE IN DEXTROSE 10 MCG/KG/MIN: 200 INJECTION, SOLUTION INTRAVENOUS at 08:08

## 2019-08-20 RX ADMIN — PREDNISONE 5 MG: 5 TABLET ORAL at 08:08

## 2019-08-20 RX ADMIN — HYDROCODONE BITARTRATE AND ACETAMINOPHEN 1 TABLET: 5; 325 TABLET ORAL at 04:08

## 2019-08-20 RX ADMIN — SODIUM CHLORIDE AND POTASSIUM CHLORIDE: 4.5; 1.49 INJECTION, SOLUTION INTRAVENOUS at 09:08

## 2019-08-20 RX ADMIN — ASPIRIN 325 MG ORAL TABLET 325 MG: 325 PILL ORAL at 08:08

## 2019-08-20 RX ADMIN — FAMOTIDINE 20 MG: 20 TABLET ORAL at 08:08

## 2019-08-20 RX ADMIN — POTASSIUM CHLORIDE 40 MEQ: 20 TABLET, EXTENDED RELEASE ORAL at 04:08

## 2019-08-20 RX ADMIN — FAMOTIDINE 20 MG: 20 TABLET, FILM COATED ORAL at 04:08

## 2019-08-20 RX ADMIN — SODIUM BICARBONATE 650 MG TABLET 650 MG: at 08:08

## 2019-08-20 RX ADMIN — LEVOTHYROXINE SODIUM 100 MCG: 100 TABLET ORAL at 06:08

## 2019-08-20 RX ADMIN — MORPHINE SULFATE 1 MG: 2 INJECTION, SOLUTION INTRAMUSCULAR; INTRAVENOUS at 07:08

## 2019-08-20 RX ADMIN — MORPHINE SULFATE 2 MG: 2 INJECTION, SOLUTION INTRAMUSCULAR; INTRAVENOUS at 08:08

## 2019-08-20 RX ADMIN — TACROLIMUS 3 MG: 1 CAPSULE ORAL at 08:08

## 2019-08-20 RX ADMIN — METOPROLOL TARTRATE 25 MG: 25 TABLET ORAL at 01:08
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
## 2019-08-20 NOTE — TELEPHONE ENCOUNTER
----- Message from Tianna Rizvi sent at 8/20/2019  8:20 AM CDT -----  Contact: Self/ [phone]  Patient would like a call back to speak with Lisa Olea RN.

## 2019-08-20 NOTE — H&P
History & Physical  Department of Hospital Medicine      SUBJECTIVE:     CC/Reason for Admission to Hospital:   No chief complaint on file.      History of Present Illness:    Per history and physical at outside facility;    Mr. Burkett is a 70yo M with a PMH of HTN, Thyroid disease, AAA-s/p repair, Anemia due to CKD, CKD3, and Kidney transplant 2016. He presents ot the ED with c/o Palpitations. It started after eating and was associated with chest burning. He took and antacid without relief. His Metoprolol was recently discontinued due to low BP.  He had been having tenderness to his transplanted kidney site. He has an ultrasound scheduled for tomorrow, but will likely miis it due to now being in the hospital. While in the ED, he was found to be in SVT and given Adenosine. His troponin was elevated at 1.162. Dr. Simons was notified of patient case. He was given ASA 325mg, Full dose Lovenox, and 25mg lopressor. He currently denies pain or discomforts.       Patient was referred to our facility due to elevated troponin. He was seen by Dr. Bebeto Chacon [place] .He was then referred here for left heart catheterization.he is currently chest pain-free. He has had no further SVT or palpitations. He has had no prior history of coronary artery disease. I reviewed the patient's past medical history surgical history etc.denies any smoking or tobacco abuse. Denies heavy alcohol abuse.    PTA Medications   Medication Sig    [START ON 8/21/2019] aspirin 325 MG tablet Take 1 tablet (325 mg total) by mouth once daily.    calcitRIOL (ROCALTROL) 0.25 MCG Cap Take 1 capsule (0.25 mcg total) by mouth once daily.    famotidine (PEPCID) 20 MG tablet Take 1 tablet (20 mg total) by mouth every evening.    k phos di & mono-sod phos mono (K-PHOS-NEUTRAL) 250 mg Tab Take 3 tablets by mouth 2 (two) times daily.    ketoconazole (NIZORAL) 200 mg Tab Take 0.5 tablets (100 mg total) by mouth once daily.    levothyroxine (SYNTHROID) 100  MCG tablet Take 1 tablet (100 mcg total) by mouth once daily.    magnesium oxide (MAG-OX) 400 mg (241.3 mg magnesium) tablet Take 1 tablet (400 mg total) by mouth once daily.    metoprolol tartrate (LOPRESSOR) 25 MG tablet Take 0.5 tablets (12.5 mg total) by mouth 2 (two) times daily.    multivitamin (ONE DAILY MULTIVITAMIN) per tablet Take 1 tablet by mouth once daily.    mycophenolate (CELLCEPT) 250 mg Cap Take 3 capsules (750 mg total) by mouth 2 (two) times daily. Z94.0/Kidney Transplant on 16    predniSONE (DELTASONE) 5 MG tablet Take 1 tablet (5 mg total) by mouth once daily. Z94.0/Kidney transplant on 2016    sodium bicarbonate 650 MG tablet Take 1 tablet (650 mg total) by mouth 2 (two) times daily.    tacrolimus (PROGRAF) 1 MG Cap Take 3 capsules (3 mg total) by mouth every morning AND 2 capsules (2 mg total) every evening. Z94.0/Kidney Transplant on 16.       Review of patient's allergies indicates:  No Known Allergies     Past Medical History:   Diagnosis Date    Acidosis     Adrenal adenoma     Anemia associated with chronic renal failure     Arrhythmia, onset 2015    Awaiting organ transplant status 2013    Basal cell carcinoma 2012    left nasal tip    Blood type B+ 2013    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  Renal allograft biopsy 17 (DIVINE): 21 glomeruli, none globally sclerosed, <5% interstitial fibrosis, no ACR, c4d negative, AVR CCT Type 2 (V1 lesion); plan THYMO     Dissecting aortic aneurysm (any part), abdominal     Diverticulosis     Encounter for blood transfusion     ESRD (end stage renal disease) 2010    H/O urethral stricture 2018    H/O:  urethral stricture     History of AAA (abdominal aortic aneurysm) repair     History of urethral stricture 12/19/2018    Hypertension     Hypokalemia     Hypothyroidism 1/10/2014    Inguinal hernia bilateral, non-recurrent     Kidney stones     Organ transplant candidate 11/26/2013    Plantar warts 1/10/2014    Recurrent UTI 7/28/2017    S/P kidney transplant     Secondary hyperparathyroidism, renal     Thyroid disease      Past Surgical History:   Procedure Laterality Date    ABDOMINAL SURGERY      exploratory lapatomy x 2    AORTA - SUPERIOR MESENTERIC ARTERY BYPASS GRAFT      BIOPSY - Ultrasound guided N/A 1/23/2017    [facility]formed [facility] at [facility] OR 2ND FLR    BLADDER NECK RECONSTRUCTION      BLADDER SURGERY      COLONOSCOPY  10/10/2013    Dr. Gutierrez, repeat in 5 years    COLONOSCOPY N/A 10/10/2013    [facility] ENDO    CYSTOSCOPY      CYSTOSCOPY N/A 12/19/2018    Performed by Dewey Mann [facility] at [facility] OR 1ST FLR    CYSTOSCOPY N/A 12/27/2017    Performed by [facility] at [facility] OR 1ST FLR    CYSTOSCOPY N/A 7/19/2017    Performed by [facility] at [facility] OR 1ST FLR    CYSTOSCOPY, WITH DIRECT VISION INTERNAL URETHROTOMY N/A 12/19/2018    Performed by [facility] at [facility] OR 1ST FLR    DILATION, URETHRA N/A 12/19/2018    Performed by [facility] at [facility] OR 1ST FLR    GASTROJEJUNOSTOMY      HEMORRHOID SURGERY      HERNIA REPAIR      KIDNEY TRANSPLANT      LITHOTRIPSY      PERCUTANEOUS NEPHROLITHOTRIPSY      right  ESWL  10/31/12    right ESWL  6/26/12    TRANSPLANT-KIDNEY N/A 11/26/2016    Performed by Andrew Lopez Jr., MD at [facility] OR 2ND FLR    URETHROGRAM-RETROGRADE N/A 12/27/2017    Performed by [facility] at [facility] OR 1ST FLR    URETHROTOMY-DIRECT VISUAL INTERNAL (DVIU) N/A 12/27/2017    Performed by [facility] at [facility] OR 1ST FLR    URETHROTOMY-DIRECT VISUAL INTERNAL (DVIU) N/A 7/19/2017     [facility] at [facility] OR [address] [place] [address]     Family History   Problem Relation Age of Onset    Diabetes Mother     Alzheimer's disease Mother     Alcohol abuse Father     HIV Brother     Stroke Maternal Aunt     Kidney disease Paternal Uncle     Kidney disease Cousin     No Known Problems Sister     No Known Problems Daughter     No Known Problems Sister     No Known Problems Brother     No Known Problems Brother     Cancer Brother         thyroid cancer    Melanoma Neg Hx     Psoriasis Neg Hx     Lupus Neg Hx     Eczema Neg Hx     Colon cancer Neg Hx     Colon polyps Neg Hx     Crohn's disease Neg Hx     Ulcerative colitis Neg Hx     Celiac disease Neg Hx      Social History     Tobacco Use    Smoking status: Former Smoker     Years: 40.00     Types: Cigarettes     Last attempt to quit: 2010     Years since quittin.1    Smokeless tobacco: Never Used   Substance Use Topics    Alcohol use: No     Comment: stopped ETOH in     Drug use: No     Comment: THC in youth         Review of Systems:  Constitutional: no fever or chills, negative for chills, fevers and weight loss  ENT: no nasal congestion or sore throat  Respiratory: no cough or shortness of breath  Cardiovascular: positive for chest discomfort as well as palpitations. None currently.  Gastrointestinal: no nausea or vomiting, no abdominal pain or change in bowel habits  Genitourinary: no hematuria or dysuria, negative for frequency  Allergy/Immunology: nasal congestion and postnasal drip  Musculoskeletal: no arthralgias or myalgias  Neurological: no seizures or tremors, negative for dizziness and headaches  Dermatologic: no significant lesions or skin breakdown noted    OBJECTIVE:     Vital Signs (Most Recent):  Temp: 98.2 °F (36.8 °C) (19 1200)  Pulse: 60 (19 1615)  Resp: 15 (19 1615)  BP: 136/86 (19 1615)  SpO2: 98 % (19 1615)       Physical Exam:  General- Resting in bed,  NAD  HEENT- PERRLA, EOMI, OP clear, MMM  Neck- No JVD, no LAD, no Thyromegaly  CV- Regular rate and rhythm, No Murmur, rubs, or gallops.  Resp- Lungs CTA Bilaterally, No increased WOB  GI- Soft, Non tender/non-distended, BS normoactive x4 quads, no HSM  Extrem- No cyanosis, clubbing, edema. 2+ DPs bilaterally.  Neuro- CN II-XII grossly intact, no sensory or motor deficits noted.  PSYC: Alert and oriented times four.  Euthymic mood      Laboratory and Radilogy Data:  CBC:   Recent Labs   Lab 08/19/19  2305   WBC 8.89   RBC 4.31*   HGB 12.8*   HCT 40.8      MCV 95   MCH 29.7   MCHC 31.4*     BMP:   Recent Labs   Lab 08/19/19  2305         K 3.3*      CO2 26   BUN 20   CREATININE 1.8*   CALCIUM 10.4     CMP:   Recent Labs   Lab 08/19/19  2305      CALCIUM 10.4   ALBUMIN 3.3*   PROT 6.8      K 3.3*   CO2 26      BUN 20   CREATININE 1.8*   ALKPHOS 55   ALT 22   AST 30   BILITOT 0.5     LFTs:   Recent Labs   Lab 08/19/19  2305   ALT 22   AST 30   ALKPHOS 55   BILITOT 0.5   PROT 6.8   ALBUMIN 3.3*     Coagulation:   Recent Labs   Lab 08/20/19  1300   INR 1.1     Cardiac markers:   Recent Labs   Lab 08/20/19  0920   TROPONINI 6.920*     No results for input(s): COLORU, CLARITYU, SPECGRAV, PHUR, PROTEINUA, GLUCOSEU, BILIRUBINCON, BLOODU, WBCU, RBCU, BACTERIA, MUCUS, NITRITE, LEUKOCYTESUR, UROBILINOGEN, HYALINECASTS in the last 168 hours.    ASSESSMENT/PLAN:     Active Hospital Problems    Diagnosis  POA    Elevated troponin [R74.8]  Yes      Resolved Hospital Problems   No resolved problems to display.       Plan: See orders dated today for the complete plan. Case discussed with Dr. ALANNA Oscar.     Acute myocardial infarction- patient be taken to the Cath Lab for acute left heart catheterization. Trend cardiac enzymes. Unclear etiology. This may be secondary to coronary artery disease versus significant supraventricular tachycardia which is currently resolved. Trend cardiac  enzymes. Obtain echo and chest x-ray. Recheck labs.Discussed with Dr Javan Oscar, Interventional Cardiology personally.    Paroxysmal supraventricular tachycardia currently resolved- present at outside facility. Currently resolved. We'll continue to monitor on telemetry. Continue current dose of beta-blockade.     donor kidney transplant 2016- ontinue Prograf and CellCept. Patient complains of pain at the transplanted kidney site. He will need an ultrasound immediately at this site.    Chronic hypothyroidism- continue home levothyroxine. Outside TSH 3.834.      Anemia of chronic disease -currently stable. Cont  to Monitor    Essential hypertension-currently stable. Continue to monitor blood pressure while receiving beta-blockade.

## 2019-08-20 NOTE — ED PROVIDER NOTES
"Encounter Date: 2019    SCRIBE #1 NOTE: IMarcela, am scribing for, and in the presence of, Vimal Menjivar MD.       History     Chief Complaint   Patient presents with    Heart racing     started around 2 pm along with chest burning and took an antacid       Time seen by provider: 10:50 PM on 2019    Alin Burkett is a 69 y.o. male with PMHx of HTN, thyroid disease, cancer, ESRD, and kidney transplant who presents to the ED for evaluation of "rapid heart beat" that started ~x9 hours ago at 2PM. The patient describes having a "burning sensation" in his chest s/p eating. He has taken Antacid without relief. The patient endorses having similar episodes in the past. He denies being on blood thinners. The patient mentions recently discontinuing Metoprolol secondary to having low blood pressure. The patient has no other medical concerns or complaints at this moment. He denies onset of any other new symptoms currently. SHx includes percutaneous nephrolithotripsy, bladder surgery, cystoscopy, colonoscopy, and superior mesenteric artery bypass graft. NKDA noted.     The history is provided by the patient and the spouse.     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  Renal allograft biopsy 17 (DIVINE): 21 glomeruli, none globally sclerosed, <5% interstitial fibrosis, no ACR, c4d negative, " AVR CCT Type 2 (V1 lesion); plan THYMO     Dissecting aortic aneurysm (any part), abdominal     Diverticulosis     Encounter for blood transfusion     ESRD (end stage renal disease) 06/16/2010    H/O urethral stricture 11/27/2018    H/O: urethral stricture     History of AAA (abdominal aortic aneurysm) repair     History of urethral stricture 12/19/2018    Hypertension     Hypokalemia     Hypothyroidism 1/10/2014    Inguinal hernia bilateral, non-recurrent     Kidney stones     Organ transplant candidate 11/26/2013    Plantar warts 1/10/2014    Recurrent UTI 7/28/2017    S/P kidney transplant     Secondary hyperparathyroidism, renal     Thyroid disease      Past Surgical History:   Procedure Laterality Date    ABDOMINAL SURGERY      exploratory lapatomy x 2    AORTA - SUPERIOR MESENTERIC ARTERY BYPASS GRAFT      BIOPSY - Ultrasound guided N/A 1/23/2017    Performed by [facility] at [facility] OR 2ND FLR    BLADDER NECK RECONSTRUCTION      BLADDER SURGERY      COLONOSCOPY  10/10/2013    Dr. Gutierrez, repeat in 5 years    COLONOSCOPY N/A 10/10/2013    [facility] at [facility] ENDO    CYSTOSCOPY      CYSTOSCOPY N/A 12/19/2018    Performed by Dewey Mann [facility] at [facility] OR 1ST FLR    CYSTOSCOPY N/A 12/27/2017    Performed by [facility]wey [facility] at [facility] OR 1ST FLR    CYSTOSCOPY N/A 7/19/2017    Performed by [facility]wey Mann [facility] at [facility] OR 1ST FLR    CYSTOSCOPY, WITH DIRECT VISION INTERNAL URETHROTOMY N/A 12/19/2018    Performed by Dewey Mann MD at [facility] OR 1ST FLR    DILATION, URETHRA N/A 12/19/2018    Performed by Dewey Mann [facility] at [facility] OR 1ST FLR    GASTROJEJUNOSTOMY      HEMORRHOID SURGERY      HERNIA REPAIR      KIDNEY TRANSPLANT      LITHOTRIPSY      PERCUTANEOUS NEPHROLITHOTRIPSY      right  ESWL  10/31/12    right ESWL  6/26/12    TRANSPLANT-KIDNEY N/A 11/26/2016    Performed by Andrew Lopez Jr., MD at [facility] OR 2ND FLR    URETHROGRAM-RETROGRADE N/A  2017    [facility] at [facility] OR 1ST FLR    URETHROTOMY-DIRECT VISUAL INTERNAL (DVIU) N/A 2017    Performed by [facility]wey Mann [facility] at [facility] OR 1ST FLR    URETHROTOMY-DIRECT VISUAL INTERNAL (DVIU) N/A 2017    Performed by [facility]wey Mann [facility] at [facility] OR 1ST FLR     Family History   Problem Relation Age of Onset    Diabetes Mother     Alzheimer's disease Mother     Alcohol abuse Father     HIV Brother     Stroke Maternal Aunt     Kidney disease Paternal Uncle     Kidney disease Cousin     No Known Problems Sister     No Known Problems Daughter     No Known Problems Sister     No Known Problems Brother     No Known Problems Brother     Cancer Brother         thyroid cancer    Melanoma Neg Hx     Psoriasis Neg Hx     Lupus Neg Hx     Eczema Neg Hx     Colon cancer Neg Hx     Colon polyps Neg Hx     Crohn's disease Neg Hx     Ulcerative colitis Neg Hx     Celiac disease Neg Hx      Social History     Tobacco Use    Smoking status: Former Smoker     Years: 40.00     Types: Cigarettes     Last attempt to quit: 2010     Years since quittin.1    Smokeless tobacco: Never Used   Substance Use Topics    Alcohol use: No     Comment: stopped ETOH in     Drug use: No     Comment: THC in youth     Review of Systems   Constitutional: Negative for diaphoresis and fever.   HENT: Negative for facial swelling.    Respiratory: Negative for cough and shortness of breath.    Cardiovascular: Positive for palpitations. Negative for chest pain.   Gastrointestinal: Negative for vomiting.   Genitourinary: Negative for hematuria.   Musculoskeletal: Negative for joint swelling.   Skin: Negative for rash.   Neurological: Negative for weakness and headaches.   Hematological: Does not bruise/bleed easily.   Psychiatric/Behavioral: The patient is not nervous/anxious.        Physical Exam     Initial Vitals [19 2246]   BP Pulse Resp Temp SpO2   (!) 77/55 (!) 153 16 99.4 °F  (37.4 °C) 98 %      MAP       --         Physical Exam    Nursing note and vitals reviewed.  Constitutional: He appears well-developed and well-nourished. He is not diaphoretic.  Non-toxic appearance. No distress.   HENT:   Head: Normocephalic and atraumatic.   Eyes: EOM are normal. Pupils are equal, round, and reactive to light.   Neck: Normal range of motion. No neck rigidity.   Cardiovascular: Regular rhythm, normal heart sounds and intact distal pulses. Tachycardia present.  Exam reveals no gallop and no friction rub.    No murmur heard.  Pulses:       Radial pulses are 2+ on the right side, and 2+ on the left side.   Pulmonary/Chest: Breath sounds normal. He has no wheezes. He has no rhonchi. He has no rales.   Abdominal: Soft. There is no tenderness.   Abdominal scar. No tenderness.    Musculoskeletal: Normal range of motion.   Neurological: He is alert and oriented to person, place, and time. He has normal strength. No cranial nerve deficit.   Skin: Skin is warm and dry.   Psychiatric: He has a normal mood and affect. His speech is normal and behavior is normal. He is not actively hallucinating.         ED Course   Cardioversion  Date/Time: 8/20/2019 6:00 AM  Performed by: Vimal Menjivar MD  Authorized by: Clementina Evans MD   Consent Done: Emergent Situation  Patient sedated: no  Cardioversion basis: emergent  Pre-procedure rhythm: supraventricular tachycardia  Patient position: patient was placed in a supine position  Chest area: chest area exposed  Electrodes: pads  Electrodes placed: anterior-posterior  Number of attempts: 1  Cardioversion mode attempt one: Medical cardioversion was performed with adenosine, 6 mg IV with successful conversion to sinus rhythm.  Post-procedure rhythm: normal sinus rhythm  Complications: no complications  Patient tolerance: Patient tolerated the procedure well with no immediate complications        Labs Reviewed   CBC W/ AUTO DIFFERENTIAL - Abnormal; Notable for the  following components:       Result Value    RBC 4.31 (*)     Hemoglobin 12.8 (*)     Mean Corpuscular Hemoglobin Conc 31.4 (*)     Immature Grans (Abs) 0.06 (*)     Gran% 75.6 (*)     Lymph% 13.9 (*)     All other components within normal limits   COMPREHENSIVE METABOLIC PANEL - Abnormal; Notable for the following components:    Potassium 3.3 (*)     Creatinine 1.8 (*)     Albumin 3.3 (*)     eGFR if  44 (*)     eGFR if non  38 (*)     All other components within normal limits   TROPONIN I - Abnormal; Notable for the following components:    Troponin I 1.162 (*)     All other components within normal limits   B-TYPE NATRIURETIC PEPTIDE - Abnormal; Notable for the following components:     (*)     All other components within normal limits     EKG Readings: (Independently Interpreted)   Rhythm: Supraventricular Tachycardia (SVT). Heart Rate: 157 BPM.       Imaging Results          X-Ray Chest PA And Lateral (In process)                  Medical Decision Making:   History:   Old Medical Records: I decided to obtain old medical records.  Initial Assessment:   Patient is 69-year-old man who presents emergency department complaining of burning chest pain and palpitations.  Patient is found to be in SVT.  No improvement with vagal maneuvers.  Patient converted to sinus rhythm after 6 mg of adenosine.  Chest pain resolved after conversion of sinus rhythm. Troponin was found to be elevated, likely secondary to SVT more so than ACS.  EKG you with findings concerning for LVH and repolarization abnormalities versus ischemic changes.  Discussed with Dr. Simons who recommends Lovenox, aspirin, stress echo admission Hospital Medicine who I discussed with and agrees to admit the patient.  Independently Interpreted Test(s):   I have ordered and independently interpreted EKG Reading(s) - see prior notes  Clinical Tests:   Lab Tests: Reviewed and Ordered  Radiological Study: Reviewed and  Ordered  Medical Tests: Ordered and Reviewed  ED Management:  Critical Care Time MDM    The high probability of sudden, clinically significant deterioration in the patient's condition required the highest level of my preparedness to intervene urgently.    The services I provided to this patient were to treat and/or prevent clinically significant deterioration that could result in permanent disability, chronic pain and/or death.     Services included the following: chart data review, reviewing nursing notes and/or old charts, documentation time, consultant collaboration regarding findings and treatment options, medication orders and management, direct patient care, vital sign assessments and ordering, interpreting and reviewing diagnostic studies/lab tests.    Aggregate critical care time was between 30 and 74 minutes, which includes only time during which I was engaged in work directly related to the patient's care, as described above, whether at the bedside or elsewhere in the Emergency Department.     Vimal Menjivar M.D.       Other:   I have discussed this case with another health care provider.            Scribe Attestation:   Scribe #1: I performed the above scribed service and the documentation accurately describes the services I performed. I attest to the accuracy of the note.      I, Otto Rush, personally performed the services described in this documentation. All medical record entries made by the scribe were at my direction and in my presence.  I have reviewed the chart and agree that the record reflects my personal performance and is accurate and complete. Vimal Menjivar MD.  6:01 AM 08/20/2019       DISCLAIMER: This note was prepared with [employer] Flu[employer] Direct voice recognition transcription software. Garbled syntax, mangled pronouns, and other bizarre constructions may be attributed to that software system.               Clinical Impression:       ICD-10-CM ICD-9-CM   1. SVT  (supraventricular tachycardia) I47.1 427.89   2. Chest pain R07.9 786.50   3. Acute coronary syndrome I24.9 411.1         Disposition:   Disposition: Admitted                        Vimal Menjivar MD  08/20/19 0601

## 2019-08-20 NOTE — NURSING
Received report from, Araceli RN, Care assumed. Dobutamine Infusion initiated, pt tolerating fine. VSS. Denies any pain or other complaints at this time. Will continue to monitor.   3401- Spoke with Dr Simons, whom ordered to D/C Dobutrex drip. Keep pt NPO but okay to give one time dose of Lopressor that he ordered, and to tx to [facility].

## 2019-08-20 NOTE — TELEPHONE ENCOUNTER
----- Message from Tianna Rizvi sent at 8/20/2019  8:20 AM CDT -----  Contact: Self/ [phone]  Patient would like a call back to speak with Lisa Olea RN.

## 2019-08-20 NOTE — DISCHARGE SUMMARY
[facility] Hospital Medicine  Discharge Summary      Patient Name: Alin Burkett  MRN: [id number]  Admission Date: 8/19/2019  Hospital Length of Stay: 0 days  Discharge Date and Time:  08/20/2019 10:49 AM  Attending Physician: Clementina Evans MD   Discharging Provider: Clementina Evans MD  Primary Care Provider: Carmen Krueger MD      HPI:   Mr. Burkett is a 70yo M with a PMH of HTN, Thyroid disease, AAA-s/p repair, Anemia due to CKD, CKD3, and Kidney transplant 2016. He presents ot the ED with c/o Palpitations. It started after eating and was associated with chest burning. He took and antacid without relief. His Metoprolol was recently discontinued due to low BP.  He had been having tenderness to his transplanted kidney site. He has an ultrasound scheduled for tomorrow, but will likely miis it due to now being in the hospital. While in the ED, he was found to be in SVT and given Adenosine. His troponin was elevated at 1.162. Dr. Simons was notified of patient case. He was given ASA 325mg, Full dose Lovenox, and 25mg lopressor. He currently denies pain or discomforts.     Hospital Course:   The patient is a 69-year-old gentleman who was admitted to the hospital with symptomatic  SVT.  He was treated with adenosine in the emergency room and converted to a sinus rhythm.  The patient also complained of chest pain therefore we checked a troponin which is abnormal.  The patient was seen by Dr. Simons today who recommended transfer to [facility] for a left heart catheterization.    The patient has a history of a kidney transplant and has chronic kidney disease which is stable.      Consults:   Consults (From admission, onward)        Status Ordering Provider     Inpatient consult to Cardiology  Once     Provider:  Antwon Simons MD    Acknowledged CLEMENTINA EVANS            Final Active Diagnoses:    Diagnosis Date Noted POA    PRINCIPAL PROBLEM:  SVT (supraventricular tachycardia)  [I47.1] 2019 Yes    Stage 3 chronic kidney disease [N18.3] 2017 Yes     Chronic    -donor kidney transplant 16 [Z94.0]  Not Applicable    Acute coronary syndrome [I24.9] 2016 Yes    Hypothyroidism [E03.9] 01/10/2014 Yes    Essential hypertension [I10]  Yes    Anemia of chronic disease [D63.8]  Yes      Problems Resolved During this Admission:       Discharged Condition: stable    Disposition: Another Health Care Inst*         Diet Cardiac     Activity as tolerated       Significant Diagnostic Studies: Labs:   BMP:   Recent Labs   Lab 19  2305         K 3.3*      CO2 26   BUN 20   CREATININE 1.8*   CALCIUM 10.4    and CBC   Recent Labs   Lab 19  2305   WBC 8.89   HGB 12.8*   HCT 40.8          Pending Diagnostic Studies:     Procedure Component Value Units Date/Time    EKG 12-lead [[id number]] Collected:  19    Order Status:  Sent Lab Status:  In process Updated:  19    Narrative:       Test Reason : I47.1,    Vent. Rate : 086 BPM     Atrial Rate : 086 BPM     P-R Int : 216 ms          QRS Dur : 080 ms      QT Int : 374 ms       P-R-T Axes : 053 030 147 degrees     QTc Int : 447 ms    Sinus rhythm with 1st degree A-V block with Premature atrial complexes  LVH with repolarization abnormality  Abnormal ECG  When compared with ECG of 19-AUG-2019 22:53,  Premature atrial complexes are now Present  AZ interval has increased  Vent. rate has decreased BY  71 BPM  ST no longer depressed in Inferior leads    Referred By: [email or web address]   SELF           Confirmed By:     Echocardiogram stress test [[id number]]     Order Status:  Sent Lab Status:  No result     Transthoracic echo (TTE) 2D with Color Flow [[id number]]     Order Status:  Sent Lab Status:  No result     US Transplant Kidney With Doppler [[id number]] Resulted:  19    Order Status:  Sent Lab Status:  In process Updated:  19         Medications:  Reconciled  Home Medications:      Medication List      START taking these medications    aspirin 325 MG tablet  Take 1 tablet (325 mg total) by mouth once daily.  Start taking on:  8/21/2019        CHANGE how you take these medications    k phos di & mono-sod phos mono 250 mg Tab  Commonly known as:  K-PHOS-NEUTRAL  Take 3 tablets by mouth 2 (two) times daily.  What changed:  Another medication with the same name was removed. Continue taking this medication, and follow the directions you see here.        CONTINUE taking these medications    calcitRIOL 0.25 MCG Cap  Commonly known as:  ROCALTROL  Take 1 capsule (0.25 mcg total) by mouth once daily.     famotidine 20 MG tablet  Commonly known as:  PEPCID  Take 1 tablet (20 mg total) by mouth every evening.     ketoconazole 200 mg Tab  Commonly known as:  NIZORAL  Take 0.5 tablets (100 mg total) by mouth once daily.     levothyroxine 100 MCG tablet  Commonly known as:  SYNTHROID  Take 1 tablet (100 mcg total) by mouth once daily.     magnesium oxide 400 mg (241.3 mg magnesium) tablet  Commonly known as:  MAG-OX  Take 1 tablet (400 mg total) by mouth once daily.     metoprolol tartrate 25 MG tablet  Commonly known as:  LOPRESSOR  Take 0.5 tablets (12.5 mg total) by mouth 2 (two) times daily.     mycophenolate 250 mg Cap  Commonly known as:  CELLCEPT  Take 3 capsules (750 mg total) by mouth 2 (two) times daily. Z94.0/Kidney Transplant on 11/26/16     ONE DAILY MULTIVITAMIN per tablet  Generic drug:  multivitamin  Take 1 tablet by mouth once daily.     predniSONE 5 MG tablet  Commonly known as:  DELTASONE  Take 1 tablet (5 mg total) by mouth once daily. Z94.0/Kidney transplant on 11/26/2016     sodium bicarbonate 650 MG tablet  Take 1 tablet (650 mg total) by mouth 2 (two) times daily.     tacrolimus 1 MG Cap  Commonly known as:  PROGRAF  Take 3 capsules (3 mg total) by mouth every morning AND 2 capsules (2 mg total) every evening. Z94.0/Kidney Transplant on 11/26/16.             Indwelling Lines/Drains at time of discharge:   Lines/Drains/Airways     Drain                 Hemodialysis AV Fistula Left forearm -- days         Urethral Catheter 12/19/18 1614 Double-lumen;Latex 22 Fr. 243 days                Time spent on the discharge of patient: 30 minutes  Patient was seen and examined on the date of discharge and determined to be suitable for discharge.         Clementina Evans MD  Department of Hospital Medicine  [facility]

## 2019-08-20 NOTE — H&P
[facility] Medicine  History & Physical    Patient Name: Alin Burkett  MRN: [id number]  Admission Date: 2019  Attending Physician: Clementina Evans MD   Primary Care Provider: Carmen Krueger MD         Patient information was obtained from patient, past medical records and ER records.     Subjective:     Principal Problem:SVT (supraventricular tachycardia)    Chief Complaint:   Chief Complaint   Patient presents with    Heart racing     started around 2 pm along with chest burning and took an antacid        HPI: Mr. Burkett is a 70yo M with a PMH of HTN, Thyroid disease, AAA-s/p repair, Anemia due to CKD, CKD3, and Kidney transplant 2016. He presents ot the ED with c/o Palpitations. It started after eating and was associated with chest burning. He took and antacid without relief. His Metoprolol was recently discontinued due to low BP.  He had been having tenderness to his transplanted kidney site. He has an ultrasound scheduled for tomorrow, but will likely miis it due to now being in the hospital. While in the ED, he was found to be in SVT and given Adenosine. His troponin was elevated at 1.162. Dr. Simons was notified of patient case. He was given ASA 325mg, Full dose Lovenox, and 25mg lopressor. He currently denies pain or discomforts.     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  Renal allograft biopsy 17 (DIVINE): 21 glomeruli,  none globally sclerosed, <5% interstitial fibrosis, no ACR, c4d negative, AVR CCT Type 2 (V1 lesion); plan THYMO     Dissecting aortic aneurysm (any part), abdominal     Diverticulosis     Encounter for blood transfusion     ESRD (end stage renal disease) 06/16/2010    H/O urethral stricture 11/27/2018    H/O: urethral stricture     History of AAA (abdominal aortic aneurysm) repair     History of urethral stricture 12/19/2018    Hypertension     Hypokalemia     Hypothyroidism 1/10/2014    Inguinal hernia bilateral, non-recurrent     Kidney stones     Organ transplant candidate 11/26/2013    Plantar warts 1/10/2014    Recurrent UTI 7/28/2017    S/P kidney transplant     Secondary hyperparathyroidism, renal     Thyroid disease        Past Surgical History:   Procedure Laterality Date    ABDOMINAL SURGERY      exploratory lapatomy x 2    AORTA - SUPERIOR MESENTERIC ARTERY BYPASS GRAFT      BIOPSY - Ultrasound guided N/A 1/23/2017    Performed by [facility] at [facility] OR [facility]ND FLR    BLADDER NECK RECONSTRUCTION      BLADDER SURGERY      COLONOSCOPY  10/10/2013    Dr. Gutierrez, repeat in 5 years    COLONOSCOPY N/A 10/10/2013    Per[facility] by [facility] at [facility]    CYSTOSCOPY      CYSTOSCOPY N/A 12/19/2018    Performed by Dewey Mann [facility] at [facility] OR [facility]ST FLR    CYSTOSCOPY N/A 12/27/2017    Performed by [facility] at [facility] OR [facility]    CYSTOSCOPY N/A 7/19/2017    Performed by [facility] at [facility] OR [facility]    CYSTOSCOPY, WITH DIRECT VISION INTERNAL URETHROTOMY N/A 12/19/2018    Performed by [facility] at [facility] OR [facility]ST [facility]LR    DILATION, URETHRA N/A 12/19/2018    Performed by [facility] MD at [facility] OR [facility]ST [facility]LR    GASTROJEJUNOSTOMY      HEMORRHOID SURGERY      HERNIA REPAIR      KIDNEY TRANSPLANT      LITHOTRIPSY      PERCUTANEOUS NEPHROLITHOTRIPSY      right  ESWL  10/31/12    right ESWL  6/26/12    TRANSPLANT-KIDNEY N/A 11/26/2016    Performed by  Andrew Lopez Jr., MD at [facility] OR 2ND FLR    URETHROGRAM-RETROGRADE N/A 12/27/2017    Performed by [facility] Mann MD at [facility] OR [facility]ST [facility]LR    URETHROTOMY-DIRECT VISUAL INTERNAL (DVIU) N/A 12/27/2017    Performed by [facility]wey Mann MD at [facility] OR 1ST FLR    URETHROTOMY-DIRECT VISUAL INTERNAL (DVIU) N/A 7/19/2017    Performed by Dewey Mann MD at [facility] OR 1ST FLR       Review of patient's allergies indicates:  No Known Allergies    No current facility-administered medications on file prior to encounter.      Current Outpatient Medications on File Prior to Encounter   Medication Sig    calcitRIOL (ROCALTROL) 0.25 MCG Cap Take 1 capsule (0.25 mcg total) by mouth once daily.    famotidine (PEPCID) 20 MG tablet Take 1 tablet (20 mg total) by mouth every evening.    ketoconazole (NIZORAL) 200 mg Tab Take 0.5 tablets (100 mg total) by mouth once daily.    levothyroxine (SYNTHROID) 100 MCG tablet Take 1 tablet (100 mcg total) by mouth once daily.    magnesium oxide (MAG-OX) 400 mg (241.3 mg magnesium) tablet Take 1 tablet (400 mg total) by mouth once daily.    multivitamin (ONE DAILY MULTIVITAMIN) per tablet Take 1 tablet by mouth once daily.    mycophenolate (CELLCEPT) 250 mg Cap Take 3 capsules (750 mg total) by mouth 2 (two) times daily. Z94.0/Kidney Transplant on 11/26/16    predniSONE (DELTASONE) 5 MG tablet Take 1 tablet (5 mg total) by mouth once daily. Z94.0/Kidney transplant on 11/26/2016    sodium bicarbonate 650 MG tablet Take 1 tablet (650 mg total) by mouth 2 (two) times daily.    tacrolimus (PROGRAF) 1 MG Cap Take 3 capsules (3 mg total) by mouth every morning AND 2 capsules (2 mg total) every evening. Z94.0/Kidney Transplant on 11/26/16.    k phos di & mono-sod phos mono (K-PHOS-NEUTRAL) 250 mg Tab Take 3 tablets by mouth 2 (two) times daily.    k phos di & mono-sod phos mono (PHOSPHO-FARIBA 250 NEUTRAL) 250 mg Tab Take 1 tablet by mouth 2 (two) times daily.    metoprolol tartrate (LOPRESSOR) 25  MG tablet Take 0.5 tablets (12.5 mg total) by mouth 2 (two) times daily.     Family History     Problem Relation (Age of Onset)    Alcohol abuse Father    Alzheimer's disease Mother    Cancer Brother    Diabetes Mother    HIV Brother    Kidney disease Paternal Uncle, Cousin    No Known Problems Sister, Daughter, Sister, Brother, Brother    Stroke Maternal Aunt        Tobacco Use    Smoking status: Former Smoker     Years: 40.00     Types: Cigarettes     Last attempt to quit: 2010     Years since quittin.1    Smokeless tobacco: Never Used   Substance and Sexual Activity    Alcohol use: No     Comment: stopped ETOH in     Drug use: No     Comment: THC in youth    Sexual activity: Yes     Partners: Female     Birth control/protection: None     Review of Systems   Constitutional: Negative for chills, diaphoresis and fever.   HENT: Negative for congestion.    Eyes: Negative for visual disturbance.   Respiratory: Negative for cough and shortness of breath.    Cardiovascular: Positive for chest pain and palpitations.   Gastrointestinal: Negative for abdominal pain, diarrhea, nausea and vomiting.   Genitourinary: Negative for dysuria.   Musculoskeletal: Negative for arthralgias.   Skin: Negative for wound.   Neurological: Negative for dizziness, syncope and headaches.   Psychiatric/Behavioral: Negative for confusion and hallucinations.     Objective:     Vital Signs (Most Recent):  Temp: 97.9 °F (36.6 °C) (19)  Pulse: 77 (19)  Resp: 18 (19)  BP: 115/78 (19)  SpO2: 99 % (19) Vital Signs (24h Range):  Temp:  [97.9 °F (36.6 °C)-99.4 °F (37.4 °C)] 97.9 °F (36.6 °C)  Pulse:  [] 77  Resp:  [16-18] 18  SpO2:  [93 %-99 %] 99 %  BP: ()/(51-78) 115/78     Weight: 68.1 kg (150 lb 2.1 oz)  Body mass index is 20.94 kg/m².    Physical Exam   Constitutional: He is oriented to person, place, and time. No distress.   HENT:   Head: Normocephalic.   Eyes:  Pupils are equal, round, and reactive to light.   Neck: Normal range of motion. Neck supple. No JVD present. No tracheal deviation present.   Cardiovascular: Normal rate, regular rhythm, normal heart sounds and intact distal pulses.   Pulmonary/Chest: Effort normal and breath sounds normal. No respiratory distress.   Abdominal: Soft. Bowel sounds are normal. He exhibits no distension. There is tenderness in the right upper quadrant. There is no guarding.   Musculoskeletal: Normal range of motion. He exhibits no edema.   Neurological: He is alert and oriented to person, place, and time. No cranial nerve deficit.   Skin: Skin is warm, dry and intact. Capillary refill takes less than 2 seconds.         CRANIAL NERVES     CN III, IV, VI   Pupils are equal, round, and reactive to light.       Significant Labs:   CBC:   Recent Labs   Lab 19  2305   WBC 8.89   HGB 12.8*   HCT 40.8        CMP:   Recent Labs   Lab 19  2305      K 3.3*      CO2 26      BUN 20   CREATININE 1.8*   CALCIUM 10.4   PROT 6.8   ALBUMIN 3.3*   BILITOT 0.5   ALKPHOS 55   AST 30   ALT 22   ANIONGAP 10   EGFRNONAA 38*     Cardiac Markers:   Recent Labs   Lab 19  2305   *     Troponin:   Recent Labs   Lab 19  2305   TROPONINI 1.162*       Significant Imaging:     CXR: Reviewed film-- looks ok.    Assessment/Plan:     * SVT (supraventricular tachycardia)  Given IV Adenosine 6mg x1 in ED and converted to NSR  Currently NSR  Continue BB  Monitor on telemetry  Consult cardiology      Acute coronary syndrome  Likey due to SVT  Chest pain resolved when converted to NSR  Troponin 1.162--continue to trend  Full dose lovenox and ASA 325mg given in ED  Continue ASA daily  Check fasting Lipid panel  NPO for ECHO stress test  Monitor on telemetry  Consult Cardiology    Stage 3 chronic kidney disease  Stable  Avoid NSAIDs/Nephrotoxic agents  Renal diet  Monitor labs      -donor kidney transplant  11/26/16  Continue Prograf and Cellcept  Obtain Ultrasound since he is having pain at transplanted kidney site      Hypothyroidism  Chronic. Continue home levothyroxine dose.  Obtain TSH  Lab Results   Component Value Date    TSH 3.834 03/28/2018           Anemia of chronic disease  Stable  Monitor labs      Essential hypertension  Chronic/Controlled.   Not on chronic medications  Adding BB for SVT  Monitor VS      VTE Risk Mitigation (From admission, onward)        Ordered     IP VTE LOW RISK PATIENT  Once     Given Full Dose Lovenox in ED 08/20/19 0333             Lorena Gage NP  Department of Hospital Medicine   [facility]

## 2019-08-20 NOTE — PLAN OF CARE
CM attempted to meet with pt to complete discharge assessment. Pt currently having ECHO done at bedside. CM will try again at later time.     08/20/19 1010   Discharge Assessment   Assessment Type Discharge Planning Assessment

## 2019-08-20 NOTE — TELEPHONE ENCOUNTER
Coordinator returned patient's call. Patient informed coordinator he is currently admitted to [facility] for evaluation of chest pain. Patient reports the kidney ultrasound he was scheduled for today will be done inpatient.

## 2019-08-20 NOTE — Clinical Note
Catheter is inserted into the ostium   left main. Angiography performed of the left coronary arteries in multiple views. Angiography performed via power injection with 8 mL contrast at 4 mL/s. JL 4.0

## 2019-08-20 NOTE — ASSESSMENT & PLAN NOTE
Chronic. Continue home levothyroxine dose.  Obtain TSH  Lab Results   Component Value Date    TSH 3.834 03/28/2018

## 2019-08-20 NOTE — HOSPITAL COURSE
The patient is a 69-year-old gentleman who was admitted to the hospital with symptomatic  SVT.  He was treated with adenosine in the emergency room and converted to a sinus rhythm.  The patient also complained of chest pain therefore we checked a troponin which is abnormal.  The patient was seen by Dr. Ronak herr who recommended transfer to [facility] for a left heart catheterization.    The patient has a history of a kidney transplant and has chronic kidney disease which is stable.

## 2019-08-20 NOTE — ASSESSMENT & PLAN NOTE
Likey due to SVT  Chest pain resolved when converted to NSR  Troponin 1.162--continue to trend  Full dose lovenox and ASA 325mg given in ED  Continue ASA daily  Check fasting Lipid panel  NPO for ECHO stress test  Monitor on telemetry  Consult Cardiology

## 2019-08-20 NOTE — ASSESSMENT & PLAN NOTE
Continue Prograf and Cellcept  Obtain Ultrasound since he is having pain at transplanted kidney site

## 2019-08-20 NOTE — HPI
Mr. Burkett is a 68yo M with a PMH of HTN, Thyroid disease, AAA-s/p repair, Anemia due to CKD, CKD3, and Kidney transplant 2016. He presents ot the ED with c/o Palpitations. It started after eating and was associated with chest burning. He took and antacid without relief. His Metoprolol was recently discontinued due to low BP.  He had been having tenderness to his transplanted kidney site. He has an ultrasound scheduled for tomorrow, but will likely miis it due to now being in the hospital. While in the ED, he was found to be in SVT and given Adenosine. His troponin was elevated at 1.162. Dr. Simons was notified of patient case. He was given ASA 325mg, Full dose Lovenox, and 25mg lopressor. He currently denies pain or discomforts.

## 2019-08-20 NOTE — PLAN OF CARE
08/20/19 1131   Final Note   Assessment Type Discharge Planning Reassessment   Anticipated Discharge Disposition Short Term

## 2019-08-20 NOTE — NURSING
Spoke with Dr. Simons concerning pts elevated troponin. Ronak is on his way to see pt.     0950  Informed Dr. Evans that Dr. Simons wants pt transferred to [facility] today. Provided Dr. Evans with the transfer center phone number.     8537  Called transfer center to inform them that per Dr. Simons pt needs to transfer STAT to [facility].

## 2019-08-20 NOTE — Clinical Note
Catheter is inserted into the ostium   right coronary artery. Angiography performed of the right coronary arteries in multiple views. Angiography performed via power injection with 6 mL contrast at 3 mL/s. JR 4.0

## 2019-08-20 NOTE — PLAN OF CARE
08/20/19 0342   Patient Assessment/Suction   Level of Consciousness (AVPU) alert   Respiratory Effort Unlabored   Expansion/Accessory Muscles/Retractions no use of accessory muscles   PRE-TX-O2   O2 Device (Oxygen Therapy) room air   SpO2 98 %   Pulse Oximetry Type Intermittent   $ Pulse Oximetry - Multiple Charge Pulse Oximetry - Multiple   Pulse 75   Resp 18

## 2019-08-20 NOTE — ASSESSMENT & PLAN NOTE
Given IV Adenosine 6mg x1 in ED and converted to NSR  Currently NSR  Continue BB  Monitor on telemetry  Consult cardiology

## 2019-08-20 NOTE — PLAN OF CARE
Pt transferred to [facility] before  could complete discharge assessment.      08/20/19 1131   Discharge Assessment   Assessment Type Discharge Planning Reassessment

## 2019-08-20 NOTE — NURSING
Spoke with Tx Center whom said report can be called @ [phone] to [facility]. Pt will tx to Room # 2533 under the care of Hospitalist Dr. Alec Camacho and that ambulance transportation is currently being set-up.      1050- Attempted to call report to [facility] but was told they do not have a bed or nurse available. Called transfer center about situation and was told house supervisor is aware of the patient being transferred and to recall [facility]. Called [facility] again and report given to Nurse Carlson at [facility].   Pt aware of Transfer and verbalized understanding of POC. VSS. Denies any complaints.      1111- Pt transported off unit via stretcher by [facility].

## 2019-08-20 NOTE — SUBJECTIVE & OBJECTIVE
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  Renal allograft biopsy 17 (DIVINE): 21 glomeruli, none globally sclerosed, <5% interstitial fibrosis, no ACR, c4d negative, AVR CCT Type 2 (V1 lesion); plan THYMO     Dissecting aortic aneurysm (any part), abdominal     Diverticulosis     Encounter for blood transfusion     ESRD (end stage renal disease) 2010    H/O urethral stricture 2018    H/O: urethral stricture     History of AAA (abdominal aortic aneurysm) repair     History of urethral stricture 2018    Hypertension     Hypokalemia     Hypothyroidism 1/10/2014    Inguinal hernia bilateral, non-recurrent     Kidney stones     Organ transplant candidate 2013    Plantar warts 1/10/2014    Recurrent UTI 2017    S/P kidney transplant     Secondary hyperparathyroidism, renal     Thyroid disease        Past Surgical History:   Procedure Laterality Date    ABDOMINAL SURGERY      exploratory lapatomy x 2    AORTA - SUPERIOR MESENTERIC ARTERY BYPASS GRAFT      BIOPSY - Ultrasound guided N/A 2017    [facility]    BLADDER NECK RECONSTRUCTION      BLADDER SURGERY      COLONOSCOPY  10/10/2013    Dr. Gutierrez, repeat in 5 years    COLONOSCOPY N/A 10/10/2013    [facility]    CYSTOSCOPY      CYSTOSCOPY N/A 2018    Performed by De[facility] Mann [facility]  at [facility] OR 1ST FLR    CYSTOSCOPY N/A 12/27/2017    Performed by [facility]wey [facility] MD at [facility] OR [facility]LR    CYSTOSCOPY N/A 7/19/2017    Performed by [facility] at [facility] OR [facility]LR    CYSTOSCOPY, WITH DIRECT VISION INTERNAL URETHROTOMY N/A 12/19/2018    Performed by [facility] at [facility] OR 1ST [facility]LR    DILATION, URETHRA N/A 12/19/2018    Performed by [facility] at [facility] OR [facility]ST [facility]LR    GASTROJEJUNOSTOMY      HEMORRHOID SURGERY      HERNIA REPAIR      KIDNEY TRANSPLANT      LITHOTRIPSY      PERCUTANEOUS NEPHROLITHOTRIPSY      right  ESWL  10/31/12    right ESWL  6/26/12    TRANSPLANT-KIDNEY N/A 11/26/2016    Performed by Andrew Lopez Jr., MD at [facility] OR 2ND FLR    URETHROGRAM-RETROGRADE N/A 12/27/2017    Performed by [facility]wey Mann MD at [facility] OR [facility]ST FLR    URETHROTOMY-DIRECT VISUAL INTERNAL (DVIU) N/A 12/27/2017    Performed by [facility] Mann MD at [facility] OR 1ST FLR    URETHROTOMY-DIRECT VISUAL INTERNAL (DVIU) N/A 7/19/2017    Performed by Dewey Mann MD at [facility] OR [address] [place] [address]       Review of patient's allergies indicates:  No Known Allergies    No current facility-administered medications on file prior to encounter.      Current Outpatient Medications on File Prior to Encounter   Medication Sig    calcitRIOL (ROCALTROL) 0.25 MCG Cap Take 1 capsule (0.25 mcg total) by mouth once daily.    famotidine (PEPCID) 20 MG tablet Take 1 tablet (20 mg total) by mouth every evening.    ketoconazole (NIZORAL) 200 mg Tab Take 0.5 tablets (100 mg total) by mouth once daily.    levothyroxine (SYNTHROID) 100 MCG tablet Take 1 tablet (100 mcg total) by mouth once daily.    magnesium oxide (MAG-OX) 400 mg (241.3 mg magnesium) tablet Take 1 tablet (400 mg total) by mouth once daily.    multivitamin (ONE DAILY MULTIVITAMIN) per tablet Take 1 tablet by mouth once daily.    mycophenolate (CELLCEPT) 250 mg Cap Take 3 capsules (750 mg total) by mouth 2 (two) times daily. Z94.0/Kidney Transplant on 11/26/16     predniSONE (DELTASONE) 5 MG tablet Take 1 tablet (5 mg total) by mouth once daily. Z94.0/Kidney transplant on 2016    sodium bicarbonate 650 MG tablet Take 1 tablet (650 mg total) by mouth 2 (two) times daily.    tacrolimus (PROGRAF) 1 MG Cap Take 3 capsules (3 mg total) by mouth every morning AND 2 capsules (2 mg total) every evening. Z94.0/Kidney Transplant on 16.    k phos di & mono-sod phos mono (K-PHOS-NEUTRAL) 250 mg Tab Take 3 tablets by mouth 2 (two) times daily.    k phos di & mono-sod phos mono (PHOSPHO-FARIBA 250 NEUTRAL) 250 mg Tab Take 1 tablet by mouth 2 (two) times daily.    metoprolol tartrate (LOPRESSOR) 25 MG tablet Take 0.5 tablets (12.5 mg total) by mouth 2 (two) times daily.     Family History     Problem Relation (Age of Onset)    Alcohol abuse Father    Alzheimer's disease Mother    Cancer Brother    Diabetes Mother    HIV Brother    Kidney disease Paternal Uncle, Cousin    No Known Problems Sister, Daughter, Sister, Brother, Brother    Stroke Maternal Aunt        Tobacco Use    Smoking status: Former Smoker     Years: 40.00     Types: Cigarettes     Last attempt to quit: 2010     Years since quittin.1    Smokeless tobacco: Never Used   Substance and Sexual Activity    Alcohol use: No     Comment: stopped ETOH in     Drug use: No     Comment: THC in youth    Sexual activity: Yes     Partners: Female     Birth control/protection: None     Review of Systems   Constitutional: Negative for chills, diaphoresis and fever.   HENT: Negative for congestion.    Eyes: Negative for visual disturbance.   Respiratory: Negative for cough and shortness of breath.    Cardiovascular: Positive for chest pain and palpitations.   Gastrointestinal: Negative for abdominal pain, diarrhea, nausea and vomiting.   Genitourinary: Negative for dysuria.   Musculoskeletal: Negative for arthralgias.   Skin: Negative for wound.   Neurological: Negative for dizziness, syncope and headaches.    Psychiatric/Behavioral: Negative for confusion and hallucinations.     Objective:     Vital Signs (Most Recent):  Temp: 97.9 °F (36.6 °C) (08/20/19 0224)  Pulse: 77 (08/20/19 0224)  Resp: 18 (08/20/19 0224)  BP: 115/78 (08/20/19 0224)  SpO2: 99 % (08/20/19 0224) Vital Signs (24h Range):  Temp:  [97.9 °F (36.6 °C)-99.4 °F (37.4 °C)] 97.9 °F (36.6 °C)  Pulse:  [] 77  Resp:  [16-18] 18  SpO2:  [93 %-99 %] 99 %  BP: ()/(51-78) 115/78     Weight: 68.1 kg (150 lb 2.1 oz)  Body mass index is 20.94 kg/m².    Physical Exam   Constitutional: He is oriented to person, place, and time. No distress.   HENT:   Head: Normocephalic.   Eyes: Pupils are equal, round, and reactive to light.   Neck: Normal range of motion. Neck supple. No JVD present. No tracheal deviation present.   Cardiovascular: Normal rate, regular rhythm, normal heart sounds and intact distal pulses.   Pulmonary/Chest: Effort normal and breath sounds normal. No respiratory distress.   Abdominal: Soft. Bowel sounds are normal. He exhibits no distension. There is tenderness in the right upper quadrant. There is no guarding.   Musculoskeletal: Normal range of motion. He exhibits no edema.   Neurological: He is alert and oriented to person, place, and time. No cranial nerve deficit.   Skin: Skin is warm, dry and intact. Capillary refill takes less than 2 seconds.         CRANIAL NERVES     CN III, IV, VI   Pupils are equal, round, and reactive to light.       Significant Labs:   CBC:   Recent Labs   Lab 08/19/19  2305   WBC 8.89   HGB 12.8*   HCT 40.8        CMP:   Recent Labs   Lab 08/19/19  2305      K 3.3*      CO2 26      BUN 20   CREATININE 1.8*   CALCIUM 10.4   PROT 6.8   ALBUMIN 3.3*   BILITOT 0.5   ALKPHOS 55   AST 30   ALT 22   ANIONGAP 10   EGFRNONAA 38*     Cardiac Markers:   Recent Labs   Lab 08/19/19  2305   *     Troponin:   Recent Labs   Lab 08/19/19  2305   TROPONINI 1.162*       Significant Imaging:      CXR: Reviewed film-- looks ok.

## 2019-08-20 NOTE — NURSING
Pt arrived on floor via ems from [facility]. Per report and after speaking with DR. SALDANA PT. IS SUPPOSE TO GO DIRECTLY TO CATH LAB FOR ANGIO. HOWEVER THERE WAS STILL ANOTHER PT. ON TABLE SO PT. PLACED IN ROOM. VS TAKEN AND RECORDED. A SECOND IV PLACED PER PROTOCOL. UNABLE TO VIEW OR DOCUMENT ON PT. AT TIME DUE TO NOT TRANSFERRED OVER TO OUR COMPUTER SYSTEM. HOUSE SUPERVISOR MADE AWARE.

## 2019-08-20 NOTE — PLAN OF CARE
Problem: Adult Inpatient Plan of Care  Goal: Plan of Care Review  Outcome: Ongoing (interventions implemented as appropriate)  Pt in bed resting. VSS. No acute distress noted. NPO at this time for stress test this morning. Ultrasound kidney this morning as well. Sinus rhythm on the monitor. No complaints of pain. Up ad [place]. Safety maintained. Bed low and locked. Call light in reach. Will continue to monitor.

## 2019-08-21 ENCOUNTER — HOSPITAL ENCOUNTER (INPATIENT)
Facility: HOSPITAL | Age: 69
LOS: 4 days | Discharge: HOME | DRG: 274 | End: 2019-08-25
Attending: HOSPITALIST | Admitting: HOSPITALIST
Payer: MEDICARE

## 2019-08-21 ENCOUNTER — TELEPHONE (OUTPATIENT)
Dept: MEDSURG UNIT | Facility: HOSPITAL | Age: 69
End: 2019-08-21

## 2019-08-21 VITALS
HEIGHT: 71 IN | OXYGEN SATURATION: 97 % | RESPIRATION RATE: 18 BRPM | DIASTOLIC BLOOD PRESSURE: 75 MMHG | BODY MASS INDEX: 20.87 KG/M2 | TEMPERATURE: 99 F | WEIGHT: 149.06 LBS | SYSTOLIC BLOOD PRESSURE: 117 MMHG | HEART RATE: 70 BPM

## 2019-08-21 DIAGNOSIS — D63.8 ANEMIA OF CHRONIC DISEASE: Chronic | ICD-10-CM

## 2019-08-21 DIAGNOSIS — N25.81 SECONDARY HYPERPARATHYROIDISM, RENAL: Chronic | ICD-10-CM

## 2019-08-21 DIAGNOSIS — Z79.60 LONG-TERM USE OF IMMUNOSUPPRESSANT MEDICATION: ICD-10-CM

## 2019-08-21 DIAGNOSIS — N39.0 UTI DUE TO KLEBSIELLA SPECIES: ICD-10-CM

## 2019-08-21 DIAGNOSIS — N18.30 STAGE 3 CHRONIC KIDNEY DISEASE: Chronic | ICD-10-CM

## 2019-08-21 DIAGNOSIS — B96.89 UTI DUE TO KLEBSIELLA SPECIES: ICD-10-CM

## 2019-08-21 DIAGNOSIS — Z94.0 DECEASED-DONOR KIDNEY TRANSPLANT: ICD-10-CM

## 2019-08-21 DIAGNOSIS — I49.9 ARRHYTHMIA: ICD-10-CM

## 2019-08-21 DIAGNOSIS — I10 ESSENTIAL HYPERTENSION: Chronic | ICD-10-CM

## 2019-08-21 DIAGNOSIS — I49.3 PVC (PREMATURE VENTRICULAR CONTRACTION): ICD-10-CM

## 2019-08-21 DIAGNOSIS — A04.72 C. DIFFICILE DIARRHEA: ICD-10-CM

## 2019-08-21 DIAGNOSIS — I11.9 HYPERTENSIVE LEFT VENTRICULAR HYPERTROPHY, WITHOUT HEART FAILURE: ICD-10-CM

## 2019-08-21 DIAGNOSIS — I47.10 SVT (SUPRAVENTRICULAR TACHYCARDIA): Primary | ICD-10-CM

## 2019-08-21 DIAGNOSIS — E03.9 ACQUIRED HYPOTHYROIDISM: Chronic | ICD-10-CM

## 2019-08-21 PROBLEM — I21.9 AMI (ACUTE MYOCARDIAL INFARCTION): Chronic | Status: ACTIVE | Noted: 2019-08-20

## 2019-08-21 LAB
BASOPHILS # BLD AUTO: 0.02 K/UL (ref 0–0.2)
BASOPHILS NFR BLD: 0.3 % (ref 0–1.9)
DIFFERENTIAL METHOD: ABNORMAL
EOSINOPHIL # BLD AUTO: 0.2 K/UL (ref 0–0.5)
EOSINOPHIL NFR BLD: 2.1 % (ref 0–8)
ERYTHROCYTE [DISTWIDTH] IN BLOOD BY AUTOMATED COUNT: 13.8 % (ref 11.5–14.5)
HCT VFR BLD AUTO: 37.8 % (ref 40–54)
HGB BLD-MCNC: 12.1 G/DL (ref 14–18)
IMM GRANULOCYTES # BLD AUTO: 0.04 K/UL (ref 0–0.04)
IMM GRANULOCYTES NFR BLD AUTO: 0.6 % (ref 0–0.5)
LYMPHOCYTES # BLD AUTO: 0.4 K/UL (ref 1–4.8)
LYMPHOCYTES NFR BLD: 5.1 % (ref 18–48)
MCH RBC QN AUTO: 30.1 PG (ref 27–31)
MCHC RBC AUTO-ENTMCNC: 32 G/DL (ref 32–36)
MCV RBC AUTO: 94 FL (ref 82–98)
MONOCYTES # BLD AUTO: 0.5 K/UL (ref 0.3–1)
MONOCYTES NFR BLD: 6.5 % (ref 4–15)
NEUTROPHILS # BLD AUTO: 6.2 K/UL (ref 1.8–7.7)
NEUTROPHILS NFR BLD: 85.4 % (ref 38–73)
NRBC BLD-RTO: 0 /100 WBC
PLATELET # BLD AUTO: 150 K/UL (ref 150–350)
PMV BLD AUTO: 10.9 FL (ref 9.2–12.9)
RBC # BLD AUTO: 4.02 M/UL (ref 4.6–6.2)
WBC # BLD AUTO: 7.22 K/UL (ref 3.9–12.7)

## 2019-08-21 PROCEDURE — 63600175 PHARM REV CODE 636 W HCPCS: Performed by: FAMILY MEDICINE

## 2019-08-21 PROCEDURE — 20600001 HC STEP DOWN PRIVATE ROOM

## 2019-08-21 PROCEDURE — 36415 COLL VENOUS BLD VENIPUNCTURE: CPT

## 2019-08-21 PROCEDURE — 83735 ASSAY OF MAGNESIUM: CPT

## 2019-08-21 PROCEDURE — 84100 ASSAY OF PHOSPHORUS: CPT

## 2019-08-21 PROCEDURE — 99223 1ST HOSP IP/OBS HIGH 75: CPT | Mod: ,,, | Performed by: HOSPITALIST

## 2019-08-21 PROCEDURE — 85025 COMPLETE CBC W/AUTO DIFF WBC: CPT

## 2019-08-21 PROCEDURE — 25000003 PHARM REV CODE 250: Performed by: SPECIALIST

## 2019-08-21 PROCEDURE — 80053 COMPREHEN METABOLIC PANEL: CPT

## 2019-08-21 PROCEDURE — 99223 PR INITIAL HOSPITAL CARE,LEVL III: ICD-10-PCS | Mod: ,,, | Performed by: HOSPITALIST

## 2019-08-21 PROCEDURE — 94761 N-INVAS EAR/PLS OXIMETRY MLT: CPT

## 2019-08-21 PROCEDURE — 25000003 PHARM REV CODE 250: Performed by: FAMILY MEDICINE

## 2019-08-21 RX ORDER — SODIUM CHLORIDE 0.9 % (FLUSH) 0.9 %
10 SYRINGE (ML) INJECTION
Status: DISCONTINUED | OUTPATIENT
Start: 2019-08-22 | End: 2019-08-25 | Stop reason: HOSPADM

## 2019-08-21 RX ORDER — POTASSIUM CHLORIDE 750 MG/1
30 CAPSULE, EXTENDED RELEASE ORAL ONCE
Status: COMPLETED | OUTPATIENT
Start: 2019-08-21 | End: 2019-08-21

## 2019-08-21 RX ORDER — LEVOTHYROXINE SODIUM 50 UG/1
100 TABLET ORAL DAILY
Status: DISCONTINUED | OUTPATIENT
Start: 2019-08-22 | End: 2019-08-25 | Stop reason: HOSPADM

## 2019-08-21 RX ORDER — MYCOPHENOLATE MOFETIL 250 MG/1
750 CAPSULE ORAL 2 TIMES DAILY
Status: DISCONTINUED | OUTPATIENT
Start: 2019-08-22 | End: 2019-08-24

## 2019-08-21 RX ORDER — CALCITRIOL 0.25 UG/1
0.25 CAPSULE ORAL DAILY
Status: DISCONTINUED | OUTPATIENT
Start: 2019-08-22 | End: 2019-08-25 | Stop reason: HOSPADM

## 2019-08-21 RX ORDER — HYDROCODONE BITARTRATE AND ACETAMINOPHEN 5; 325 MG/1; MG/1
1 TABLET ORAL EVERY 8 HOURS PRN
Status: DISCONTINUED | OUTPATIENT
Start: 2019-08-21 | End: 2019-08-25 | Stop reason: HOSPADM

## 2019-08-21 RX ORDER — ACETAMINOPHEN 325 MG/1
650 TABLET ORAL EVERY 4 HOURS PRN
Status: DISCONTINUED | OUTPATIENT
Start: 2019-08-22 | End: 2019-08-25 | Stop reason: HOSPADM

## 2019-08-21 RX ORDER — METOPROLOL TARTRATE 25 MG/1
12.5 TABLET ORAL 2 TIMES DAILY
Status: DISCONTINUED | OUTPATIENT
Start: 2019-08-22 | End: 2019-08-25 | Stop reason: HOSPADM

## 2019-08-21 RX ORDER — MORPHINE SULFATE 2 MG/ML
1 INJECTION, SOLUTION INTRAMUSCULAR; INTRAVENOUS EVERY 4 HOURS PRN
Status: CANCELLED | OUTPATIENT
Start: 2019-08-21 | End: 2019-08-24

## 2019-08-21 RX ORDER — LEVOTHYROXINE SODIUM 100 UG/1
100 TABLET ORAL DAILY
Status: CANCELLED | OUTPATIENT
Start: 2019-08-22

## 2019-08-21 RX ORDER — PREDNISONE 5 MG/1
5 TABLET ORAL DAILY
Status: DISCONTINUED | OUTPATIENT
Start: 2019-08-22 | End: 2019-08-25 | Stop reason: HOSPADM

## 2019-08-21 RX ORDER — ONDANSETRON 8 MG/1
8 TABLET, ORALLY DISINTEGRATING ORAL EVERY 8 HOURS PRN
Status: DISCONTINUED | OUTPATIENT
Start: 2019-08-22 | End: 2019-08-25 | Stop reason: HOSPADM

## 2019-08-21 RX ORDER — TACROLIMUS 1 MG/1
1 CAPSULE ORAL EVERY MORNING
Status: DISCONTINUED | OUTPATIENT
Start: 2019-08-22 | End: 2019-08-22

## 2019-08-21 RX ORDER — METOPROLOL TARTRATE 25 MG/1
12.5 TABLET ORAL 2 TIMES DAILY
Status: CANCELLED | OUTPATIENT
Start: 2019-08-21

## 2019-08-21 RX ORDER — PREDNISONE 5 MG/1
5 TABLET ORAL DAILY
Status: CANCELLED | OUTPATIENT
Start: 2019-08-22

## 2019-08-21 RX ORDER — HYDROCODONE BITARTRATE AND ACETAMINOPHEN 5; 325 MG/1; MG/1
1 TABLET ORAL EVERY 8 HOURS PRN
Status: CANCELLED | OUTPATIENT
Start: 2019-08-21

## 2019-08-21 RX ORDER — POLYETHYLENE GLYCOL 3350 17 G/17G
17 POWDER, FOR SOLUTION ORAL 2 TIMES DAILY PRN
Status: DISCONTINUED | OUTPATIENT
Start: 2019-08-22 | End: 2019-08-25 | Stop reason: HOSPADM

## 2019-08-21 RX ORDER — MYCOPHENOLATE MOFETIL 250 MG/1
750 CAPSULE ORAL 2 TIMES DAILY
Status: CANCELLED | OUTPATIENT
Start: 2019-08-21

## 2019-08-21 RX ORDER — SODIUM BICARBONATE 650 MG/1
650 TABLET ORAL 2 TIMES DAILY
Status: CANCELLED | OUTPATIENT
Start: 2019-08-21

## 2019-08-21 RX ORDER — LANOLIN ALCOHOL/MO/W.PET/CERES
400 CREAM (GRAM) TOPICAL DAILY
Status: CANCELLED | OUTPATIENT
Start: 2019-08-22

## 2019-08-21 RX ORDER — TACROLIMUS 1 MG/1
1 CAPSULE ORAL EVERY MORNING
Status: CANCELLED | OUTPATIENT
Start: 2019-08-22

## 2019-08-21 RX ORDER — FAMOTIDINE 20 MG/1
20 TABLET, FILM COATED ORAL NIGHTLY
Status: DISCONTINUED | OUTPATIENT
Start: 2019-08-22 | End: 2019-08-25 | Stop reason: HOSPADM

## 2019-08-21 RX ORDER — MORPHINE SULFATE 2 MG/ML
1 INJECTION, SOLUTION INTRAMUSCULAR; INTRAVENOUS EVERY 4 HOURS PRN
Status: DISCONTINUED | OUTPATIENT
Start: 2019-08-21 | End: 2019-08-21

## 2019-08-21 RX ORDER — ASPIRIN 81 MG/1
81 TABLET ORAL DAILY
Status: DISCONTINUED | OUTPATIENT
Start: 2019-08-22 | End: 2019-08-25 | Stop reason: HOSPADM

## 2019-08-21 RX ORDER — PROMETHAZINE HYDROCHLORIDE 25 MG/1
25 TABLET ORAL EVERY 6 HOURS PRN
Status: DISCONTINUED | OUTPATIENT
Start: 2019-08-22 | End: 2019-08-25 | Stop reason: HOSPADM

## 2019-08-21 RX ORDER — LANOLIN ALCOHOL/MO/W.PET/CERES
400 CREAM (GRAM) TOPICAL DAILY
Status: DISCONTINUED | OUTPATIENT
Start: 2019-08-22 | End: 2019-08-25 | Stop reason: HOSPADM

## 2019-08-21 RX ORDER — FAMOTIDINE 20 MG/1
20 TABLET, FILM COATED ORAL NIGHTLY
Status: CANCELLED | OUTPATIENT
Start: 2019-08-21

## 2019-08-21 RX ORDER — SODIUM BICARBONATE 650 MG/1
650 TABLET ORAL 2 TIMES DAILY
Status: DISCONTINUED | OUTPATIENT
Start: 2019-08-22 | End: 2019-08-22

## 2019-08-21 RX ADMIN — METOPROLOL TARTRATE 12.5 MG: 25 TABLET, FILM COATED ORAL at 08:08

## 2019-08-21 RX ADMIN — MYCOPHENOLATE MOFETIL 750 MG: 250 CAPSULE ORAL at 10:08

## 2019-08-21 RX ADMIN — MYCOPHENOLATE MOFETIL 750 MG: 250 CAPSULE ORAL at 08:08

## 2019-08-21 RX ADMIN — FAMOTIDINE 20 MG: 20 TABLET ORAL at 08:08

## 2019-08-21 RX ADMIN — CALCITRIOL CAPSULES 0.25 MCG 0.25 MCG: 0.25 CAPSULE ORAL at 10:08

## 2019-08-21 RX ADMIN — METOPROLOL TARTRATE 12.5 MG: 25 TABLET, FILM COATED ORAL at 10:08

## 2019-08-21 RX ADMIN — MAGNESIUM OXIDE 400 MG: 400 TABLET ORAL at 10:08

## 2019-08-21 RX ADMIN — TACROLIMUS 1 MG: 1 CAPSULE ORAL at 10:08

## 2019-08-21 RX ADMIN — SODIUM BICARBONATE 650 MG TABLET 650 MG: at 08:08

## 2019-08-21 RX ADMIN — SODIUM BICARBONATE 650 MG TABLET 650 MG: at 10:08

## 2019-08-21 RX ADMIN — POTASSIUM CHLORIDE 30 MEQ: 750 CAPSULE, EXTENDED RELEASE ORAL at 10:08

## 2019-08-21 RX ADMIN — PREDNISONE 5 MG: 5 TABLET ORAL at 10:08

## 2019-08-21 RX ADMIN — LEVOTHYROXINE SODIUM 100 MCG: 100 TABLET ORAL at 10:08

## 2019-08-21 NOTE — PLAN OF CARE
Problem: Adult Inpatient Plan of Care  Goal: Optimal Comfort and Wellbeing    Intervention: Monitor Pain and Promote Comfort  Patient pain was controlled with ordered pain medication. Patient's pain was relieved and resolved. Patient did not complain of any pain after receiving dose for break through pain.

## 2019-08-21 NOTE — DISCHARGE SUMMARY
Discharge Summary  Department of [facility]      Admit Date: 8/20/2019    Discharge Date and Time:  08/21/2019 4:45 PM    Attending Physician: FELICIANO Camacho MD     Reason for Admission:  No chief complaint on file.      Procedures Performed: Procedure(s) (LRB):  Left heart cath (Left)    Hospital Course:     The patient was admitted in transfer from [facility] for elevated troponin.  The patient initially presented there after having a near syncopal event while riding a bicycle.  He had elevated troponins after being found to have super super ventricular tachycardia.  He converted with adenosine.  He was placed on beta-blockade q.8 hours.  He was transferred to our facility for angiogram.  Dr. Javan Oscar performed an angiogram.  The patient had normal coronaries.  The patient had no further chest pain or palpitations or any cardiac arrhythmias while present in hospital.  He was seen by Dr. Silver of Cardiology who has made arrangements to have the patient transferred to [facility] for EP study and ablation.  The plan is listed below.    Ms. Burkett is a 69-year-old man with HTN, hypothyroidism, AAA s/p repair, Hx kidney transplant in 2016 for ESRD due to HTN and congenital absent left kidney (on Prograf, CellCept, and prednisone), now with CKD stage 3 c/b anemia of chronic kidney disease who presented originally to [facility] with palpitations and burning chest discomfort and was later transferred to [facility] for [facility] to evaluate an elevated troponin. Dr. Simons states that the patient has had palpitations on and off for a year with recurrent syncopal episodes, most recently increased in frequency after his metoprolol was discontinued due to hypotension. The chest discomfort prompting this presentation started after a meal, and he originally attributed them to reflux, taking an antacid with partial relief of the chest pain. Initial workup significant for SVT, for which he was  given adenosine at [facility] with resolution, followed later by resuming his metoprolol tartrate 12.5 mg BID. Workup at the time significant for elevated troponin at 1.162, for which he was transferred to [facility] for a left heart catheterization.      His troponin has continued to rise, most recently elevated at 6.9. Most recent ECG showing 1st degree AV block with PACs and LVH. He underwent TTE on 8/20 that showed concentric LVH, LVEF 65%, grade II diastolic dysfunction, and IHSS with FRANCISCO and outflow obstruction (full report copied below). [facility] official report is pending, but per Dr. Simons had essentially normal coronary arteries. He is now stable on beta blockade with no recurrent chest pain or palpitations. Dr. Simons and Dr. Mcmanus discussed the case, who agreed to consult for ablation at [facility].     The patient can be transferred in stable condition.  Patient to be transferred once a bed is arranged it, and wants transfer vehicle is available.  Patient discharged in stable condition.  Chest pain free  At time of discharge.    Physical Exam:  General- Resting in bed, NAD  HEENT- PERRLA, EOMI, OP clear, MMM  Neck- No JVD, no LAD, no Thyromegaly  CV- Regular rate and rhythm, No Murmur, rubs, or gallops.  Resp- Lungs CTA Bilaterally, No increased WOB  GI- Soft, Non tender/non-distended, BS normoactive x4 quads, no HSM  Extrem- No cyanosis, clubbing, edema. 2+ DPs bilaterally.  Neuro- CN II-XII grossly intact, no sensory or motor deficits noted.  PSYC: alert and oriented times four.  Euthymic mood      Consults:  Interventional Cardiology    Significant Diagnostic Studies: Labs:   BMP:   Recent Labs   Lab 08/19/19  2305         K 3.3*      CO2 26   BUN 20   CREATININE 1.8*   CALCIUM 10.4   , CMP   Recent Labs   Lab 08/19/19  2305      K 3.3*      CO2 26      BUN 20   CREATININE 1.8*   CALCIUM 10.4   PROT 6.8   ALBUMIN 3.3*   BILITOT 0.5   ALKPHOS 55   AST 30    ALT 22   ANIONGAP 10   ESTGFRAFRICA 44*   EGFRNONAA 38*   , CBC   Recent Labs   Lab 08/19/19  2305 08/21/19  0425   WBC 8.89 7.22   HGB 12.8* 12.1*   HCT 40.8 37.8*    150   , INR   Lab Results   Component Value Date    INR 1.1 08/20/2019    INR 0.9 01/23/2017    INR 1.0 01/10/2017   , Lipid Panel   Lab Results   Component Value Date    CHOL 108 (L) 08/20/2019    HDL 32 (L) 08/20/2019    LDLCALC 62.6 (L) 08/20/2019    TRIG 67 08/20/2019    CHOLHDL 29.6 08/20/2019   , Troponin   Recent Labs   Lab 08/20/19  0920   TROPONINI 6.920*    and All labs within the past 24 hours have been reviewed        Final Diagnoses:    Principal Problem: Elevated troponin   Secondary Diagnoses:   Active Hospital Problems    Diagnosis  POA    *Elevated troponin [R74.8]  Yes    SVT (supraventricular tachycardia) [I47.1]  Yes     Chronic      Resolved Hospital Problems   No resolved problems to display.       Discharged Condition: good    Disposition: Another Health Care Institution Not Defined  [facility] for EP study and ablation    Medications:  Reconciled Home Medications:      Medication List      ASK your doctor about these medications    aspirin 325 MG tablet  Take 1 tablet (325 mg total) by mouth once daily.     calcitRIOL 0.25 MCG Cap  Commonly known as:  ROCALTROL  Take 1 capsule (0.25 mcg total) by mouth once daily.     famotidine 20 MG tablet  Commonly known as:  PEPCID  Take 1 tablet (20 mg total) by mouth every evening.     k phos di & mono-sod phos mono 250 mg Tab  Commonly known as:  K-PHOS-NEUTRAL  Take 3 tablets by mouth 2 (two) times daily.     ketoconazole 200 mg Tab  Commonly known as:  NIZORAL  Take 0.5 tablets (100 mg total) by mouth once daily.     levothyroxine 100 MCG tablet  Commonly known as:  SYNTHROID  Take 1 tablet (100 mcg total) by mouth once daily.     magnesium oxide 400 mg (241.3 mg magnesium) tablet  Commonly known as:  MAG-OX  Take 1 tablet (400 mg total) by  mouth once daily.     metoprolol tartrate 25 MG tablet  Commonly known as:  LOPRESSOR  Take 0.5 tablets (12.5 mg total) by mouth 2 (two) times daily.     mycophenolate 250 mg Cap  Commonly known as:  CELLCEPT  Take 3 capsules (750 mg total) by mouth 2 (two) times daily. Z94.0/Kidney Transplant on 11/26/16     ONE DAILY MULTIVITAMIN per tablet  Generic drug:  multivitamin  Take 1 tablet by mouth once daily.     predniSONE 5 MG tablet  Commonly known as:  DELTASONE  Take 1 tablet (5 mg total) by mouth once daily. Z94.0/Kidney transplant on 11/26/2016     sodium bicarbonate 650 MG tablet  Take 1 tablet (650 mg total) by mouth 2 (two) times daily.     tacrolimus 1 MG Cap  Commonly known as:  PROGRAF  Take 3 capsules (3 mg total) by mouth every morning AND 2 capsules (2 mg total) every evening. Z94.0/Kidney Transplant on 11/26/16.          No discharge procedures on file.      Time Spent Coordinating Care for Discharge: Greater than 30 minutes    Last Recorded LACE+ Scores     LACE+ Score     08/20 1226  63    12/19 1803  26    12/27 1119  49    10/04 1336  63    09/30 1350  60    07/19 1717  43    01/23 1258  43    01/16 1759  63    01/06 1458  58    11/30 1729  61    06/21 1723  51

## 2019-08-21 NOTE — CONSULTS
[facility]  Cardiology  Progress Note    Patient Name: Alin Burkett  MRN: [id number]  Admission Date: 8/20/2019  Hospital Length of Stay: 1 days  Code Status: Prior   Attending Physician: FELICIANO Camacho MD   Primary Care Physician: Carmen Krueger MD  Expected Discharge Date:   Principal Problem:<principal problem not specified>    Subjective:     Hospital Course: he feels ok     Interval History: cath= normal coronaries, echo  ihss   k low     ROS  Objective:     Vital Signs (Most Recent):  Temp: 98.4 °F (36.9 °C) (08/21/19 0701)  Pulse: 70 (08/21/19 0800)  Resp: 18 (08/21/19 0800)  BP: 125/84 (08/21/19 0701)  SpO2: 96 % (08/21/19 0800) Vital Signs (24h Range):  Temp:  [98.2 °F (36.8 °C)-99 °F (37.2 °C)] 98.4 °F (36.9 °C)  Pulse:  [55-95] 70  Resp:  [8-22] 18  SpO2:  [94 %-100 %] 96 %  BP: (110-136)/(65-86) 125/84     Weight: 67.6 kg (149 lb 0.5 oz)  Body mass index is 20.79 kg/m².    SpO2: 96 %  O2 Device (Oxygen Therapy): room air      Intake/Output Summary (Last 24 hours) at 8/21/2019 0844  Last data filed at 8/21/2019 0600  Gross per 24 hour   Intake 600 ml   Output --   Net 600 ml       Lines/Drains/Airways     Drain                 Hemodialysis AV Fistula Left forearm -- days          Peripheral Intravenous Line                 Peripheral IV - Single Lumen 08/19/19 2307 20 G Right Antecubital 1 day         Peripheral IV - Single Lumen 08/20/19 1200 20 G Right;Posterior Forearm less than 1 day                Physical Exam    100/60 standing   Lungs clear cor reg 2/6 late sys M apex  Increases with valsalva decreases squat     Significant Labs:   CMP   Recent Labs   Lab 08/19/19  2305      K 3.3*      CO2 26      BUN 20   CREATININE 1.8*   CALCIUM 10.4   PROT 6.8   ALBUMIN 3.3*   BILITOT 0.5   ALKPHOS 55   AST 30   ALT 22   ANIONGAP 10   ESTGFRAFRICA 44*   EGFRNONAA 38*    and CBC   Recent Labs   Lab 08/19/19  2305 08/21/19  0425   WBC 8.89 7.22   HGB 12.8* 12.1*   HCT 40.8 37.8*     150       Significant Imaging: coronaries wnl   Assessment and Plan:    sissy Collins I talked to  Dr Yusra Heath-  He agreed to accept for ablation   Transfer today or 8/22   Brief HPI:     Active Diagnoses:    Diagnosis Date Noted POA    Elevated troponin [R74.8] 08/20/2019 Yes      Problems Resolved During this Admission:       VTE Risk Mitigation (From admission, onward)    None          Antwon Simons MD  Cardiology  [facility]

## 2019-08-21 NOTE — PLAN OF CARE
(Physician in Lead of Transfers)   Outside Transfer Acceptance Note / Regional Referral Center    Transferring Physician: Antwon Simons MD (cardiology)    Accepting Physician: Erick Whitehead MD    Date of Acceptance: 08/21/2019    Transferring Facility: [facility]    Reason for Transfer: Higher level of care, specifically EP evaluation and ablation    Report from Transferring Physician/Hospital course:     Ms. Burkett is a 69-year-old man with HTN, hypothyroidism, AAA s/p repair, Hx kidney transplant in 2016 for ESRD due to HTN and congenital absent left kidney (on Prograf, CellCept, and prednisone), now with CKD stage 3 c/b anemia of chronic kidney disease who presented originally to [facility] with palpitations and burning chest discomfort and was later transferred to [facility] for C to evaluate an elevated troponin. Dr. Simons states that the patient has had palpitations on and off for a year with recurrent syncopal episodes, most recently increased in frequency after his metoprolol was discontinued due to hypotension. The chest discomfort prompting this presentation started after a meal, and he originally attributed them to reflux, taking an antacid with partial relief of the chest pain. Initial workup significant for SVT, for which he was given adenosine at [facility] with resolution, followed later by resuming his metoprolol tartrate 12.5 mg BID. Workup at the time significant for elevated troponin at 1.162, for which he was transferred to [facility] for a left heart catheterization.     His troponin has continued to rise, most recently elevated at 6.9. Most recent ECG showing 1st degree AV block with PACs and LVH. He underwent TTE on 8/20 that showed concentric LVH, LVEF 65%, grade II diastolic dysfunction, and IHSS with FRANCISCO and outflow obstruction (full report copied below). [facility] official report is pending, but per Dr. Simons had essentially normal coronary arteries.  "He is now stable on beta blockade with no recurrent chest pain or palpitations. Dr. Simons and Dr. Mcmnaus discussed the case, who [facility]      TTE performed 8/20:  · Concentric left ventricular hypertrophy.  · Normal left ventricular systolic function. The estimated ejection fraction is 65%  · Grade II (moderate) left ventricular diastolic dysfunction consistent with pseudonormalization.  · Elevated left atrial pressure.  · Moderate outflow tract left ventricular obstruction is present.  · No wall motion abnormalities.  · Normal right ventricular systolic function.  · Mild mitral regurgitation.  · Mild to moderate tricuspid regurgitation.  · Mild pulmonary hypertension present.  · Normal central venous pressure (3 mm Hg).  · The estimated PA systolic pressure is 41 mm Hg     Classic  Hypertrophic subaortic  Stenosis with FRANCISCO and outflow tract obstruction and mitral reguritation ( late)   Septum  Slightly more thickened than post wall - Dr. Simons    VS:     Temp: 98.4  HR: 70  RR: 18  BP: 125/84  SpO2: 96% on RA    Labs:     Hemoglobin 12.1  K 3.3  No magnesium  sCr 1.8    Troponin 1.162 -> 5.58 -> 6.92  TSH 4.2, fT4 0.83  INR 1.1    Radiographs:     US kidney 8/20  "1.  Mild hydronephrosis of transplanted kidney.      2.  No arterial/venous thrombosis seen." - per interpreting radiologist    To Do List:     1. Consult EP  2. Start telemetry  3. Continue metoprolol 12.5 BID  4. CMP + Mg. Goal K 4-5 and Mg 2-3  5. Would involve transplant nephrology for management of immunosuppressants.  sCr essentially at baseline, though with recent contrast exposure    Upon patient arrival to floor, please call extension [phone] (if no answer, this will flip to a beeper, so enter your call back number) for Hospital Medicine admit team assignment and for additional admit orders for the patient.  Do not page the attending physician associated with the patient on arrival (this physician may not " be on duty at the time of arrival).  Rather, always call [phone] to reach the triage physician for orders and team assignment.    Erick Whitehead M.D.  Department of Hospital Medicine  [facility]  [phone] (pager)

## 2019-08-21 NOTE — PLAN OF CARE
08/21/19 1113   Final Note   Assessment Type Final Discharge Note   Anticipated Discharge Disposition Short Term

## 2019-08-21 NOTE — PLAN OF CARE
08/21/19 1516   Final Note   Assessment Type Final Discharge Note     Pt is being transferred to [facility] this date.  No SS / CM needs addressed at this time.

## 2019-08-22 ENCOUNTER — ANESTHESIA (OUTPATIENT)
Dept: MEDSURG UNIT | Facility: HOSPITAL | Age: 69
DRG: 274 | End: 2019-08-22
Payer: MEDICARE

## 2019-08-22 ENCOUNTER — ANESTHESIA EVENT (OUTPATIENT)
Dept: MEDSURG UNIT | Facility: HOSPITAL | Age: 69
DRG: 274 | End: 2019-08-22
Payer: MEDICARE

## 2019-08-22 PROBLEM — I49.3 VENTRICULAR PREMATURE BEATS: Status: ACTIVE | Noted: 2019-08-22

## 2019-08-22 LAB
ALBUMIN SERPL BCP-MCNC: 2.9 G/DL (ref 3.5–5.2)
ALBUMIN SERPL BCP-MCNC: 2.9 G/DL (ref 3.5–5.2)
ALBUMIN SERPL BCP-MCNC: 3 G/DL (ref 3.5–5.2)
ALP SERPL-CCNC: 53 U/L (ref 55–135)
ALP SERPL-CCNC: 53 U/L (ref 55–135)
ALP SERPL-CCNC: 57 U/L (ref 55–135)
ALT SERPL W/O P-5'-P-CCNC: 18 U/L (ref 10–44)
ALT SERPL W/O P-5'-P-CCNC: 19 U/L (ref 10–44)
ALT SERPL W/O P-5'-P-CCNC: 19 U/L (ref 10–44)
ANION GAP SERPL CALC-SCNC: 10 MMOL/L (ref 8–16)
AST SERPL-CCNC: 34 U/L (ref 10–40)
AST SERPL-CCNC: 35 U/L (ref 10–40)
AST SERPL-CCNC: 35 U/L (ref 10–40)
BILIRUB SERPL-MCNC: 0.4 MG/DL (ref 0.1–1)
BUN SERPL-MCNC: 22 MG/DL (ref 8–23)
BUN SERPL-MCNC: 23 MG/DL (ref 8–23)
BUN SERPL-MCNC: 23 MG/DL (ref 8–23)
CALCIUM SERPL-MCNC: 9 MG/DL (ref 8.7–10.5)
CHLORIDE SERPL-SCNC: 109 MMOL/L (ref 95–110)
CO2 SERPL-SCNC: 20 MMOL/L (ref 23–29)
CREAT SERPL-MCNC: 1.5 MG/DL (ref 0.5–1.4)
EST. GFR  (AFRICAN AMERICAN): 54.5 ML/MIN/1.73 M^2
EST. GFR  (NON AFRICAN AMERICAN): 47.2 ML/MIN/1.73 M^2
GLUCOSE SERPL-MCNC: 77 MG/DL (ref 70–110)
GLUCOSE SERPL-MCNC: 84 MG/DL (ref 70–110)
GLUCOSE SERPL-MCNC: 84 MG/DL (ref 70–110)
MAGNESIUM SERPL-MCNC: 2 MG/DL (ref 1.6–2.6)
PHOSPHATE SERPL-MCNC: 2.1 MG/DL (ref 2.7–4.5)
POTASSIUM SERPL-SCNC: 3.4 MMOL/L (ref 3.5–5.1)
POTASSIUM SERPL-SCNC: 3.5 MMOL/L (ref 3.5–5.1)
POTASSIUM SERPL-SCNC: 3.5 MMOL/L (ref 3.5–5.1)
PROT SERPL-MCNC: 6.4 G/DL (ref 6–8.4)
PROT SERPL-MCNC: 6.4 G/DL (ref 6–8.4)
PROT SERPL-MCNC: 6.5 G/DL (ref 6–8.4)
SODIUM SERPL-SCNC: 139 MMOL/L (ref 136–145)
TACROLIMUS BLD-MCNC: <1.5 NG/ML (ref 5–15)

## 2019-08-22 PROCEDURE — D9220A PRA ANESTHESIA: ICD-10-PCS | Mod: CRNA,,, | Performed by: NURSE ANESTHETIST, CERTIFIED REGISTERED

## 2019-08-22 PROCEDURE — 25000003 PHARM REV CODE 250: Performed by: FAMILY MEDICINE

## 2019-08-22 PROCEDURE — 93621 COMP EP EVL L PAC&REC C SINS: CPT | Performed by: INTERNAL MEDICINE

## 2019-08-22 PROCEDURE — 93010 ELECTROCARDIOGRAM REPORT: CPT | Mod: ,,, | Performed by: INTERNAL MEDICINE

## 2019-08-22 PROCEDURE — 27201423 OPTIME MED/SURG SUP & DEVICES STERILE SUPPLY: Performed by: INTERNAL MEDICINE

## 2019-08-22 PROCEDURE — 93613 INTRACARDIAC EPHYS 3D MAPG: CPT | Mod: ,,, | Performed by: INTERNAL MEDICINE

## 2019-08-22 PROCEDURE — 93653 COMPRE EP EVAL TX SVT: CPT | Performed by: INTERNAL MEDICINE

## 2019-08-22 PROCEDURE — 36415 COLL VENOUS BLD VENIPUNCTURE: CPT

## 2019-08-22 PROCEDURE — 80197 ASSAY OF TACROLIMUS: CPT

## 2019-08-22 PROCEDURE — 99223 PR INITIAL HOSPITAL CARE,LEVL III: ICD-10-PCS | Mod: 25,,, | Performed by: INTERNAL MEDICINE

## 2019-08-22 PROCEDURE — 20600001 HC STEP DOWN PRIVATE ROOM

## 2019-08-22 PROCEDURE — 25000003 PHARM REV CODE 250: Performed by: NURSE ANESTHETIST, CERTIFIED REGISTERED

## 2019-08-22 PROCEDURE — 63600175 PHARM REV CODE 636 W HCPCS: Performed by: HOSPITALIST

## 2019-08-22 PROCEDURE — D9220A PRA ANESTHESIA: ICD-10-PCS | Mod: ANES,,, | Performed by: ANESTHESIOLOGY

## 2019-08-22 PROCEDURE — 93653 COMPRE EP EVAL TX SVT: CPT | Mod: ,,, | Performed by: INTERNAL MEDICINE

## 2019-08-22 PROCEDURE — 25000003 PHARM REV CODE 250: Performed by: INTERNAL MEDICINE

## 2019-08-22 PROCEDURE — 25000003 PHARM REV CODE 250: Performed by: HOSPITALIST

## 2019-08-22 PROCEDURE — 63600175 PHARM REV CODE 636 W HCPCS: Performed by: STUDENT IN AN ORGANIZED HEALTH CARE EDUCATION/TRAINING PROGRAM

## 2019-08-22 PROCEDURE — 25000003 PHARM REV CODE 250: Performed by: NURSE PRACTITIONER

## 2019-08-22 PROCEDURE — 93623 PRGRMD STIMJ&PACG IV RX NFS: CPT | Performed by: INTERNAL MEDICINE

## 2019-08-22 PROCEDURE — 93623 PR STIM/PACING HEART POST IV DRUG INFU: ICD-10-PCS | Mod: 26,,, | Performed by: INTERNAL MEDICINE

## 2019-08-22 PROCEDURE — 63600175 PHARM REV CODE 636 W HCPCS: Performed by: NURSE ANESTHETIST, CERTIFIED REGISTERED

## 2019-08-22 PROCEDURE — 93005 ELECTROCARDIOGRAM TRACING: CPT

## 2019-08-22 PROCEDURE — 93010 EKG 12-LEAD: ICD-10-PCS | Mod: ,,, | Performed by: INTERNAL MEDICINE

## 2019-08-22 PROCEDURE — 63600175 PHARM REV CODE 636 W HCPCS: Performed by: FAMILY MEDICINE

## 2019-08-22 PROCEDURE — 63600175 PHARM REV CODE 636 W HCPCS

## 2019-08-22 PROCEDURE — 99223 1ST HOSP IP/OBS HIGH 75: CPT | Mod: 25,,, | Performed by: INTERNAL MEDICINE

## 2019-08-22 PROCEDURE — 93653 PR ELECTROPHYS EVAL, COMPREHEN, W/SUPRAVENT TACHYCARD TRMT: ICD-10-PCS | Mod: ,,, | Performed by: INTERNAL MEDICINE

## 2019-08-22 PROCEDURE — C1732 CATH, EP, DIAG/ABL, 3D/VECT: HCPCS | Performed by: INTERNAL MEDICINE

## 2019-08-22 PROCEDURE — D9220A PRA ANESTHESIA: Mod: ANES,,, | Performed by: ANESTHESIOLOGY

## 2019-08-22 PROCEDURE — 99223 PR INITIAL HOSPITAL CARE,LEVL III: ICD-10-PCS | Mod: GC,,, | Performed by: INTERNAL MEDICINE

## 2019-08-22 PROCEDURE — 93623 PRGRMD STIMJ&PACG IV RX NFS: CPT | Mod: 26,,, | Performed by: INTERNAL MEDICINE

## 2019-08-22 PROCEDURE — 63600175 PHARM REV CODE 636 W HCPCS: Performed by: ANESTHESIOLOGY

## 2019-08-22 PROCEDURE — 93613 INTRACARDIAC EPHYS 3D MAPG: CPT | Performed by: INTERNAL MEDICINE

## 2019-08-22 PROCEDURE — 93621 COMP EP EVL L PAC&REC C SINS: CPT | Mod: 26,,, | Performed by: INTERNAL MEDICINE

## 2019-08-22 PROCEDURE — 93621: ICD-10-PCS | Mod: 26,,, | Performed by: INTERNAL MEDICINE

## 2019-08-22 PROCEDURE — 93613 PR INTRACARD ELECTROPHYS 3-DIMENS MAPPING: ICD-10-PCS | Mod: ,,, | Performed by: INTERNAL MEDICINE

## 2019-08-22 PROCEDURE — C1730 CATH, EP, 19 OR FEW ELECT: HCPCS | Performed by: INTERNAL MEDICINE

## 2019-08-22 PROCEDURE — 99223 1ST HOSP IP/OBS HIGH 75: CPT | Mod: GC,,, | Performed by: INTERNAL MEDICINE

## 2019-08-22 PROCEDURE — D9220A PRA ANESTHESIA: Mod: CRNA,,, | Performed by: NURSE ANESTHETIST, CERTIFIED REGISTERED

## 2019-08-22 PROCEDURE — C1894 INTRO/SHEATH, NON-LASER: HCPCS | Performed by: INTERNAL MEDICINE

## 2019-08-22 PROCEDURE — 37000008 HC ANESTHESIA 1ST 15 MINUTES: Performed by: INTERNAL MEDICINE

## 2019-08-22 PROCEDURE — 80053 COMPREHEN METABOLIC PANEL: CPT

## 2019-08-22 PROCEDURE — 37000009 HC ANESTHESIA EA ADD 15 MINS: Performed by: INTERNAL MEDICINE

## 2019-08-22 RX ORDER — PROPOFOL 10 MG/ML
VIAL (ML) INTRAVENOUS
Status: DISCONTINUED | OUTPATIENT
Start: 2019-08-22 | End: 2019-08-22

## 2019-08-22 RX ORDER — MIDAZOLAM HYDROCHLORIDE 1 MG/ML
INJECTION, SOLUTION INTRAMUSCULAR; INTRAVENOUS
Status: DISCONTINUED | OUTPATIENT
Start: 2019-08-22 | End: 2019-08-22

## 2019-08-22 RX ORDER — TACROLIMUS 1 MG/1
3 CAPSULE ORAL EVERY MORNING
Status: DISCONTINUED | OUTPATIENT
Start: 2019-08-22 | End: 2019-08-25 | Stop reason: HOSPADM

## 2019-08-22 RX ORDER — TACROLIMUS 1 MG/1
2 CAPSULE ORAL EVERY EVENING
Status: DISCONTINUED | OUTPATIENT
Start: 2019-08-22 | End: 2019-08-25 | Stop reason: HOSPADM

## 2019-08-22 RX ORDER — SODIUM CHLORIDE 9 MG/ML
INJECTION, SOLUTION INTRAVENOUS CONTINUOUS PRN
Status: DISCONTINUED | OUTPATIENT
Start: 2019-08-22 | End: 2019-08-22

## 2019-08-22 RX ORDER — POTASSIUM CHLORIDE 750 MG/1
60 CAPSULE, EXTENDED RELEASE ORAL ONCE
Status: COMPLETED | OUTPATIENT
Start: 2019-08-22 | End: 2019-08-22

## 2019-08-22 RX ORDER — FENTANYL CITRATE 50 UG/ML
INJECTION, SOLUTION INTRAMUSCULAR; INTRAVENOUS
Status: DISCONTINUED | OUTPATIENT
Start: 2019-08-22 | End: 2019-08-22

## 2019-08-22 RX ORDER — HYDROMORPHONE HYDROCHLORIDE 1 MG/ML
INJECTION, SOLUTION INTRAMUSCULAR; INTRAVENOUS; SUBCUTANEOUS
Status: COMPLETED
Start: 2019-08-22 | End: 2019-08-22

## 2019-08-22 RX ORDER — LIDOCAINE HCL/PF 100 MG/5ML
SYRINGE (ML) INTRAVENOUS
Status: DISCONTINUED | OUTPATIENT
Start: 2019-08-22 | End: 2019-08-22

## 2019-08-22 RX ORDER — LIDOCAINE HYDROCHLORIDE 20 MG/ML
INJECTION, SOLUTION INFILTRATION; PERINEURAL
Status: DISCONTINUED | OUTPATIENT
Start: 2019-08-22 | End: 2019-08-25 | Stop reason: HOSPADM

## 2019-08-22 RX ORDER — SODIUM BICARBONATE 650 MG/1
1300 TABLET ORAL 2 TIMES DAILY
Status: DISCONTINUED | OUTPATIENT
Start: 2019-08-23 | End: 2019-08-25 | Stop reason: HOSPADM

## 2019-08-22 RX ORDER — FENTANYL CITRATE 50 UG/ML
25 INJECTION, SOLUTION INTRAMUSCULAR; INTRAVENOUS EVERY 5 MIN PRN
Status: COMPLETED | OUTPATIENT
Start: 2019-08-22 | End: 2019-08-22

## 2019-08-22 RX ORDER — PROPOFOL 10 MG/ML
VIAL (ML) INTRAVENOUS CONTINUOUS PRN
Status: DISCONTINUED | OUTPATIENT
Start: 2019-08-22 | End: 2019-08-22

## 2019-08-22 RX ORDER — HYDROMORPHONE HYDROCHLORIDE 1 MG/ML
0.2 INJECTION, SOLUTION INTRAMUSCULAR; INTRAVENOUS; SUBCUTANEOUS EVERY 5 MIN PRN
Status: DISCONTINUED | OUTPATIENT
Start: 2019-08-22 | End: 2019-08-25 | Stop reason: HOSPADM

## 2019-08-22 RX ORDER — ONDANSETRON 2 MG/ML
4 INJECTION INTRAMUSCULAR; INTRAVENOUS DAILY PRN
Status: DISCONTINUED | OUTPATIENT
Start: 2019-08-22 | End: 2019-08-25 | Stop reason: HOSPADM

## 2019-08-22 RX ADMIN — ASPIRIN 81 MG: 81 TABLET, COATED ORAL at 09:08

## 2019-08-22 RX ADMIN — HYDROCODONE BITARTRATE AND ACETAMINOPHEN 1 TABLET: 5; 325 TABLET ORAL at 05:08

## 2019-08-22 RX ADMIN — Medication 400 MG: at 09:08

## 2019-08-22 RX ADMIN — FAMOTIDINE 20 MG: 20 TABLET, FILM COATED ORAL at 09:08

## 2019-08-22 RX ADMIN — CALCITRIOL CAPSULES 0.25 MCG 0.25 MCG: 0.25 CAPSULE ORAL at 09:08

## 2019-08-22 RX ADMIN — FENTANYL CITRATE 25 MCG: 50 INJECTION INTRAMUSCULAR; INTRAVENOUS at 05:08

## 2019-08-22 RX ADMIN — PREDNISONE 5 MG: 5 TABLET ORAL at 09:08

## 2019-08-22 RX ADMIN — MYCOPHENOLATE MOFETIL 750 MG: 250 CAPSULE ORAL at 09:08

## 2019-08-22 RX ADMIN — TACROLIMUS 2 MG: 1 CAPSULE ORAL at 06:08

## 2019-08-22 RX ADMIN — HYDROMORPHONE HYDROCHLORIDE 0.2 MG: 1 INJECTION, SOLUTION INTRAMUSCULAR; INTRAVENOUS; SUBCUTANEOUS at 09:08

## 2019-08-22 RX ADMIN — PROPOFOL 50 MG: 10 INJECTION, EMULSION INTRAVENOUS at 04:08

## 2019-08-22 RX ADMIN — SODIUM BICARBONATE 650 MG TABLET 650 MG: at 09:08

## 2019-08-22 RX ADMIN — ISOPROTERENOL HYDROCHLORIDE 1 MCG/MIN: 0.2 INJECTION, SOLUTION INTRAMUSCULAR; INTRAVENOUS at 03:08

## 2019-08-22 RX ADMIN — FENTANYL CITRATE 50 MCG: 50 INJECTION, SOLUTION INTRAMUSCULAR; INTRAVENOUS at 02:08

## 2019-08-22 RX ADMIN — PROPOFOL 60 MG: 10 INJECTION, EMULSION INTRAVENOUS at 03:08

## 2019-08-22 RX ADMIN — LIDOCAINE HYDROCHLORIDE 80 MG: 20 INJECTION, SOLUTION INTRAVENOUS at 01:08

## 2019-08-22 RX ADMIN — PROPOFOL 40 MG: 10 INJECTION, EMULSION INTRAVENOUS at 02:08

## 2019-08-22 RX ADMIN — SODIUM CHLORIDE: 9 INJECTION, SOLUTION INTRAVENOUS at 02:08

## 2019-08-22 RX ADMIN — FENTANYL CITRATE 25 MCG: 50 INJECTION, SOLUTION INTRAMUSCULAR; INTRAVENOUS at 03:08

## 2019-08-22 RX ADMIN — METOPROLOL TARTRATE 12.5 MG: 25 TABLET, FILM COATED ORAL at 09:08

## 2019-08-22 RX ADMIN — SODIUM CHLORIDE, SODIUM GLUCONATE, SODIUM ACETATE, POTASSIUM CHLORIDE, MAGNESIUM CHLORIDE, SODIUM PHOSPHATE, DIBASIC, AND POTASSIUM PHOSPHATE: .53; .5; .37; .037; .03; .012; .00082 INJECTION, SOLUTION INTRAVENOUS at 01:08

## 2019-08-22 RX ADMIN — HYDROMORPHONE HYDROCHLORIDE 0.2 MG: 1 INJECTION, SOLUTION INTRAMUSCULAR; INTRAVENOUS; SUBCUTANEOUS at 06:08

## 2019-08-22 RX ADMIN — LEVOTHYROXINE SODIUM 100 MCG: 50 TABLET ORAL at 07:08

## 2019-08-22 RX ADMIN — PROPOFOL 50 MCG/KG/MIN: 10 INJECTION, EMULSION INTRAVENOUS at 01:08

## 2019-08-22 RX ADMIN — MIDAZOLAM HYDROCHLORIDE 1 MG: 1 INJECTION, SOLUTION INTRAMUSCULAR; INTRAVENOUS at 01:08

## 2019-08-22 RX ADMIN — TACROLIMUS 3 MG: 1 CAPSULE ORAL at 09:08

## 2019-08-22 RX ADMIN — PROPOFOL 20 MG: 10 INJECTION, EMULSION INTRAVENOUS at 01:08

## 2019-08-22 RX ADMIN — PROPOFOL 50 MG: 10 INJECTION, EMULSION INTRAVENOUS at 01:08

## 2019-08-22 RX ADMIN — POTASSIUM CHLORIDE 60 MEQ: 750 CAPSULE, EXTENDED RELEASE ORAL at 09:08

## 2019-08-22 RX ADMIN — PROPOFOL 50 MG: 10 INJECTION, EMULSION INTRAVENOUS at 02:08

## 2019-08-22 RX ADMIN — PROPOFOL 50 MG: 10 INJECTION, EMULSION INTRAVENOUS at 03:08

## 2019-08-22 NOTE — TRANSFER OF CARE
"Anesthesia Transfer of Care Note    Patient: Alin Burkett    Procedure(s) Performed: Procedure(s) (LRB):  ABLATION, SVT (N/A)    Patient location: PACU    Anesthesia Type: general    Transport from OR: Transported from OR on 100% O2 by closed face mask with adequate spontaneous ventilation. Continuous SpO2 monitoring in transport    Post pain: adequate analgesia    Post assessment: no apparent anesthetic complications and tolerated procedure well    Post vital signs: stable    Level of consciousness: awake    Nausea/Vomiting: no nausea/vomiting    Complications: none    Transfer of care protocol was followed      Last vitals:   Visit Vitals  /77 (BP Location: Right arm, Patient Position: Lying)   Pulse 67   Temp 37.1 °C (98.7 °F) (Oral)   Resp 18   Ht 5' 11" (1.803 m)   Wt 67.2 kg (148 lb 2.4 oz)   SpO2 98%   BMI 20.66 kg/m²     "

## 2019-08-22 NOTE — CONSULTS
[facility]  Cardiac Electrophysiology  Consult Note    Admission Date: 8/21/2019  Code Status: Full Code   Attending Provider: Barbie aGrner MD  Consulting Provider: Robel Chowdhury MD  Principal Problem:<principal problem not specified>    Inpatient consult to Electrophysiology  Consult performed by: Robel Chowdhury MD  Consult ordered by: [place] MD        Subjective:     Chief Complaint:  Palpitation      HPI:   Mr. Burkett is a 69-year-old man with HTN, AAA s/p repair; hypothyroidism ESRD (KTM on 2016 on immunosuppressive therapy). He presented initially to [facility] with frequent palpitations, presyncope and chest discomfort. He was riding his bicycle when the events of being lightheaded occured last week 8/15/2019. He was about to ride his bicycle when felt lightheadedness and blurry vision and fell on fround without fully losing his consciousness. When he arrived home after this episode, he contacted [facility] and they recommended to hold off on his Metoprolol as he was hypotensive (80-90/60 mmHg). On Monday 8/19/2019; he felt another episode of lighthedadedness and indigestion, his heart rate was on 120 BPM, and he went to [facility]. He was having SVT with rate ~ 157 BPM, and received one time of 6 mg IV Adenosine which converted him to sinus rhythm (with baseline 1st degree block). No strip while giving adenosine recorded. During his stay at [facility], he was having chest discomfort associated with elevated troponin. Transferred to [facility] for LHC. He underwent LHC on 8/20/2019 and showed nonobstructive coronaries.     Of note, his palpitation has been active for at least 3-4 years. He previously saw Dr. King back in 5/2015 with similar (although less frequent) palpitation, and underwent Holter monitor and it showed (very frequent mostly monomorphic PVCs totalling [id number] and averaging 539 per hour). He was on  Metoprolol tartrate which resulted in controlling his palpitation (discontinued secondary to hypotension).       ECGs:   Prior to arrival and his syncopal episode: Sinus bradycardia with sinus arrhythmia with 1st degree A-V block  2019 On arrival to ED: SVT with V rate 157 BPM  2019 Post 6 mg IV Adenosine: Sinus rhythm with 1st degree A-V block with PACs     TTE: 2019  Concentric LVH, LVEF 65%, grade II diastolic dysfunction, and Hypertrophic subaortic Stenosis with FRANCISCO and outflow tract obstruction and MR.     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  Renal allograft biopsy 17 (DIVINE): 21 glomeruli, none globally sclerosed, <5% interstitial fibrosis, no ACR, c4d negative, AVR CCT Type 2 (V1 lesion); plan THYMO     Dissecting aortic aneurysm (any part), abdominal     Diverticulosis     Encounter for blood transfusion     ESRD (end stage renal disease) 2010    H/O urethral stricture 2018    H/O: urethral stricture     History of AAA (abdominal aortic aneurysm) repair     History of urethral stricture 2018    Hypertension     Hypokalemia     Hypothyroidism 1/10/2014    Inguinal hernia bilateral, non-recurrent     Kidney stones     Organ transplant candidate 2013    Plantar warts 1/10/2014    Recurrent UTI 2017    S/P kidney transplant     Secondary hyperparathyroidism, renal     Thyroid disease         Past Surgical History:   Procedure Laterality Date    ABDOMINAL SURGERY      exploratory lapatomy x 2    AORTA - SUPERIOR MESENTERIC ARTERY BYPASS GRAFT      BIOPSY - Ultrasound guided N/A 1/23/2017    [facility]formed by [facility] at [facility] OR 2ND FLR    BLADDER NECK RECONSTRUCTION      BLADDER SURGERY      COLONOSCOPY  10/10/2013    Dr. Gutierrez, repeat in 5 years    COLONOSCOPY N/A 10/10/2013    [facility]    CYSTOSCOPY      CYSTOSCOPY N/A 12/19/2018    Performed by Dewey Mann MD at [facility] OR 1ST FLR    CYSTOSCOPY N/A 12/27/2017    Performed by [facility] MD at [facility] OR [facility]ST FLR    CYSTOSCOPY N/A 7/19/2017    Performed by [facility] MD at [facility] OR [facility]ST FLR    CYSTOSCOPY, WITH DIRECT VISION INTERNAL URETHROTOMY N/A 12/19/2018    Performed by [facility]wey Mann MD at [facility] OR 1ST FLR    DILATION, URETHRA N/A 12/19/2018    [facility]    GASTROJEJUNOSTOMY      HEMORRHOID SURGERY      HERNIA REPAIR      KIDNEY TRANSPLANT      Left heart cath Left 8/20/2019    [facility] CATH[facility]EP LAB    LITHOTRIPSY      PERCUTANEOUS NEPHROLITHOTRIPSY      right  ESWL  10/31/12    right ESWL  6/26/12    TRANSPLANT-KIDNEY N/A 11/26/2016    Performed by Andrew Lopez Jr., MD at [facility] OR 2ND FLR    URETHROGRAM-RETROGRADE N/A 12/27/2017    Performed by [facility] at [facility] OR [facility]    URETHROTOMY-DIRECT VISUAL INTERNAL (DVIU) N/A 12/27/2017    Performed by Dewey Mann MD at [facility] OR 1ST FLR    URETHROTOMY-DIRECT VISUAL INTERNAL (DVIU) N/A 7/19/2017    Performed by Dewey Mann MD at [facility] OR [address] [place] [address]       Review of patient's allergies indicates:  No Known Allergies    Current Facility-Administered Medications on File Prior to Encounter   Medication    [COMPLETED] potassium chloride CR capsule 30 mEq    [DISCONTINUED] calcitRIOL capsule 0.25 mcg    [DISCONTINUED] famotidine tablet 20 mg     [DISCONTINUED] fentaNYL injection    [DISCONTINUED] HYDROcodone-acetaminophen 5-325 mg per tablet 1 tablet    [DISCONTINUED] iohexol (OMNIPAQUE 350) injection    [DISCONTINUED] ketoconazole split tablet 100 mg    [DISCONTINUED] levothyroxine tablet 100 mcg    [DISCONTINUED] lidocaine (PF) 10 mg/ml (1%) injection    [DISCONTINUED] magnesium oxide tablet 400 mg    [DISCONTINUED] metoprolol tartrate (LOPRESSOR) split tablet 12.5 mg    [DISCONTINUED] midazolam (VERSED) 1 mg/mL injection    [DISCONTINUED] morphine injection 1 mg    [DISCONTINUED] mycophenolate capsule 750 mg    [DISCONTINUED] predniSONE tablet 5 mg    [DISCONTINUED] sodium bicarbonate tablet 650 mg    [DISCONTINUED] tacrolimus capsule 1 mg     Current Outpatient Medications on File Prior to Encounter   Medication Sig    aspirin 325 MG tablet Take 1 tablet (325 mg total) by mouth once daily.    calcitRIOL (ROCALTROL) 0.25 MCG Cap Take 1 capsule (0.25 mcg total) by mouth once daily.    famotidine (PEPCID) 20 MG tablet Take 1 tablet (20 mg total) by mouth every evening.    levothyroxine (SYNTHROID) 100 MCG tablet Take 1 tablet (100 mcg total) by mouth once daily.    magnesium oxide (MAG-OX) 400 mg (241.3 mg magnesium) tablet Take 1 tablet (400 mg total) by mouth once daily.    metoprolol tartrate (LOPRESSOR) 25 MG tablet Take 0.5 tablets (12.5 mg total) by mouth 2 (two) times daily.    multivitamin (ONE DAILY MULTIVITAMIN) per tablet Take 1 tablet by mouth once daily.    mycophenolate (CELLCEPT) 250 mg Cap Take 3 capsules (750 mg total) by mouth 2 (two) times daily. Z94.0/Kidney Transplant on 11/26/16    predniSONE (DELTASONE) 5 MG tablet Take 1 tablet (5 mg total) by mouth once daily. Z94.0/Kidney transplant on 11/26/2016    sodium bicarbonate 650 MG tablet Take 1 tablet (650 mg total) by mouth 2 (two) times daily.    tacrolimus (PROGRAF) 1 MG Cap Take 3 capsules (3 mg total) by mouth every morning AND 2 capsules (2 mg total) every  evening. Z94.0/Kidney Transplant on 16.    k phos di & mono-sod phos mono (K-PHOS-NEUTRAL) 250 mg Tab Take 3 tablets by mouth 2 (two) times daily.    ketoconazole (NIZORAL) 200 mg Tab Take 0.5 tablets (100 mg total) by mouth once daily.     Family History     Problem Relation (Age of Onset)    Alcohol abuse Father    Alzheimer's disease Mother    Cancer Brother    Diabetes Mother    HIV Brother    Kidney disease Paternal Uncle, Cousin    No Known Problems Sister, Daughter, Sister, Brother, Brother    Stroke Maternal Aunt        Tobacco Use    Smoking status: Former Smoker     Years: 40.00     Types: Cigarettes     Last attempt to quit: 2010     Years since quittin.1    Smokeless tobacco: Never Used   Substance and Sexual Activity    Alcohol use: No     Comment: stopped ETOH in     Drug use: No     Comment: THC in youth    Sexual activity: Yes     Partners: Female     Birth control/protection: None     Review of Systems   Constitution: Negative for chills, decreased appetite and malaise/fatigue.   HENT: Negative for congestion.    Cardiovascular: Positive for near-syncope and palpitations. Negative for chest pain, dyspnea on exertion, irregular heartbeat, leg swelling, paroxysmal nocturnal dyspnea and syncope.   Respiratory: Negative for cough and shortness of breath.    Endocrine: Negative for cold intolerance and heat intolerance.   Skin: Negative for rash.   Musculoskeletal: Negative for arthritis and back pain.   Gastrointestinal: Negative for abdominal pain, constipation and diarrhea.   Genitourinary: Negative for dysuria and hematuria.   Neurological: Negative for dizziness and headaches.   Psychiatric/Behavioral: Negative for altered mental status.     Objective:     Vital Signs (Most Recent):  Temp: 98.5 °F (36.9 °C) (19)  Pulse: 64 (19 2314)  Resp: 18 (19)  BP: (!) 121/90 (19)  SpO2: 98 % (19) Vital Signs (24h Range):  Temp:  [98.4  °F (36.9 °C)-99.5 °F (37.5 °C)] 98.5 °F (36.9 °C)  Pulse:  [64-79] 64  Resp:  [17-20] 18  SpO2:  [95 %-100 %] 98 %  BP: (117-135)/(74-90) 121/90       Weight: 67.2 kg (148 lb 2.4 oz)  Body mass index is 20.66 kg/m².    SpO2: 98 %  O2 Device (Oxygen Therapy): room air    Physical Exam   Constitutional: He is oriented to person, place, and time. He appears well-developed and well-nourished. No distress.   HENT:   Head: Normocephalic.   Left Ear: External ear normal.   Eyes: Pupils are equal, round, and reactive to light. Right eye exhibits no discharge. Left eye exhibits no discharge.   Neck: Normal range of motion. No thyromegaly present.   Cardiovascular: Normal rate and regular rhythm. Exam reveals no friction rub.   No murmur heard.  Pulmonary/Chest: Effort normal and breath sounds normal. No respiratory distress. He has no wheezes.   Abdominal: Soft. Bowel sounds are normal. He exhibits no distension. There is no tenderness.   Musculoskeletal: Normal range of motion. He exhibits no edema.   Neurological: He is alert and oriented to person, place, and time. No cranial nerve deficit.   Skin:        Prior AV Fistula        Significant Labs:   CBC:   Recent Labs   Lab 08/21/19  0425   WBC 7.22   HGB 12.1*   HCT 37.8*                  Assessment and Plan:     SVT (supraventricular tachycardia)  - Symptomatic SVT with palpitation, lightheadedness and presyncope.  - His SVT could be related to structural heart disease as his TTE showed Hypertrophic subaortic stenosis and outflow tract obstruction   - Continue on Beta blocker (metoprolol tartrate 12.5 mg PO BID)  - NPO midnight, for tentative plan for radiofrequency catheter ablation with Dr. Mcmanus.     Thank you for your consult. EP team will follow-up with patient. Please contact us if you have any additional questions.    Robel Chowdhury MD  Cardiac Electrophysiology  [facility]

## 2019-08-22 NOTE — NURSING TRANSFER
Nursing Transfer Note      8/22/2019     Transfer To: EP/cath lab    Transfer via stretcher    Transfer with cardiac monitoring    Transported by staff    Medicines sent: none    Chart send with patient: Yes

## 2019-08-22 NOTE — ASSESSMENT & PLAN NOTE
- Symptomatic SVT with palpitation, lightheadedness and presyncope.  - His SVT could be related to structural heart disease as his TTE showed Hypertrophic subaortic stenosis and outflow tract obstruction   - Continue on Beta blocker (metoprolol tartrate 12.5 mg PO BID)  - NPO midnight, for tentative plan for radiofrequency catheter ablation with Dr. Mcmanus.

## 2019-08-22 NOTE — SUBJECTIVE & OBJECTIVE
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  Renal allograft biopsy 17 (DIVINE): 21 glomeruli, none globally sclerosed, <5% interstitial fibrosis, no ACR, c4d negative, AVR CCT Type 2 (V1 lesion); plan THYMO     Dissecting aortic aneurysm (any part), abdominal     Diverticulosis     Encounter for blood transfusion     ESRD (end stage renal disease) 2010    H/O urethral stricture 2018    H/O: urethral stricture     History of AAA (abdominal aortic aneurysm) repair     History of urethral stricture 2018    Hypertension     Hypokalemia     Hypothyroidism 1/10/2014    Inguinal hernia bilateral, non-recurrent     Kidney stones     Organ transplant candidate 2013    Plantar warts 1/10/2014    Recurrent UTI 2017    S/P kidney transplant     Secondary hyperparathyroidism, renal     Thyroid disease        Past Surgical History:   Procedure Laterality Date    ABDOMINAL SURGERY      exploratory lapatomy x 2    AORTA - SUPERIOR MESENTERIC ARTERY BYPASS GRAFT      BIOPSY - Ultrasound guided N/A 2017    [facility]    BLADDER NECK RECONSTRUCTION      BLADDER SURGERY      COLONOSCOPY  10/10/2013    Dr. Gutierrez, repeat in 5 years    COLONOSCOPY N/A 10/10/2013    [facility] ENDO    CYSTOSCOPY      CYSTOSCOPY N/A 2018    Performed by Dewey Mann MD  at [facility] OR 1ST FLR    CYSTOSCOPY N/A 12/27/2017    Performed by Dewey Mann MD at [facility] OR 1ST FLR    CYSTOSCOPY N/A 7/19/2017    Performed by Dewey Mann MD at [facility] OR 1ST FLR    CYSTOSCOPY, WITH DIRECT VISION INTERNAL URETHROTOMY N/A 12/19/2018    Performed by Dewey Mann MD at [facility] OR 1ST FLR    DILATION, URETHRA N/A 12/19/2018    Performed by Dewey Mann MD at [facility] OR 1ST FLR    GASTROJEJUNOSTOMY      HEMORRHOID SURGERY      HERNIA REPAIR      KIDNEY TRANSPLANT      Left heart cath Left 8/20/2019    [facility] CATH[facility]EP LAB    LITHOTRIPSY      PERCUTANEOUS NEPHROLITHOTRIPSY      right  ESWL  10/31/12    right ESWL  6/26/12    TRANSPLANT-KIDNEY N/A 11/26/2016    Performed by Andrew Lopez Jr., MD at [facility] OR 2ND FLR    URETHROGRAM-RETROGRADE N/A 12/27/2017    Performed by Dewey Mann MD at [facility] OR 1ST FLR    URETHROTOMY-DIRECT VISUAL INTERNAL (DVIU) N/A 12/27/2017    Performed by Dewey Mann MD at [facility] OR 1ST FLR    URETHROTOMY-DIRECT VISUAL INTERNAL (DVIU) N/A 7/19/2017    Performed by Dewey Mann MD at [facility] OR 1ST FLR       Review of patient's allergies indicates:  No Known Allergies    Current Facility-Administered Medications on File Prior to Encounter   Medication    [COMPLETED] potassium chloride CR capsule 30 mEq    [DISCONTINUED] calcitRIOL capsule 0.25 mcg    [DISCONTINUED] famotidine tablet 20 mg    [DISCONTINUED] fentaNYL injection    [DISCONTINUED] HYDROcodone-acetaminophen 5-325 mg per tablet 1 tablet    [DISCONTINUED] iohexol (OMNIPAQUE 350) injection    [DISCONTINUED] ketoconazole split tablet 100 mg    [DISCONTINUED] levothyroxine tablet 100 mcg    [DISCONTINUED] lidocaine (PF) 10 mg/ml (1%) injection    [DISCONTINUED] magnesium oxide tablet 400 mg    [DISCONTINUED] metoprolol tartrate (LOPRESSOR) split tablet 12.5 mg    [DISCONTINUED] midazolam (VERSED) 1 mg/mL injection    [DISCONTINUED] morphine injection 1 mg     [DISCONTINUED] mycophenolate capsule 750 mg    [DISCONTINUED] predniSONE tablet 5 mg    [DISCONTINUED] sodium bicarbonate tablet 650 mg    [DISCONTINUED] tacrolimus capsule 1 mg     Current Outpatient Medications on File Prior to Encounter   Medication Sig    aspirin 325 MG tablet Take 1 tablet (325 mg total) by mouth once daily.    calcitRIOL (ROCALTROL) 0.25 MCG Cap Take 1 capsule (0.25 mcg total) by mouth once daily.    famotidine (PEPCID) 20 MG tablet Take 1 tablet (20 mg total) by mouth every evening.    levothyroxine (SYNTHROID) 100 MCG tablet Take 1 tablet (100 mcg total) by mouth once daily.    magnesium oxide (MAG-OX) 400 mg (241.3 mg magnesium) tablet Take 1 tablet (400 mg total) by mouth once daily.    metoprolol tartrate (LOPRESSOR) 25 MG tablet Take 0.5 tablets (12.5 mg total) by mouth 2 (two) times daily.    multivitamin (ONE DAILY MULTIVITAMIN) per tablet Take 1 tablet by mouth once daily.    mycophenolate (CELLCEPT) 250 mg Cap Take 3 capsules (750 mg total) by mouth 2 (two) times daily. Z94.0/Kidney Transplant on 11/26/16    predniSONE (DELTASONE) 5 MG tablet Take 1 tablet (5 mg total) by mouth once daily. Z94.0/Kidney transplant on 11/26/2016    sodium bicarbonate 650 MG tablet Take 1 tablet (650 mg total) by mouth 2 (two) times daily.    tacrolimus (PROGRAF) 1 MG Cap Take 3 capsules (3 mg total) by mouth every morning AND 2 capsules (2 mg total) every evening. Z94.0/Kidney Transplant on 11/26/16.    k phos di & mono-sod phos mono (K-PHOS-NEUTRAL) 250 mg Tab Take 3 tablets by mouth 2 (two) times daily.    ketoconazole (NIZORAL) 200 mg Tab Take 0.5 tablets (100 mg total) by mouth once daily.     Family History     Problem Relation (Age of Onset)    Alcohol abuse Father    Alzheimer's disease Mother    Cancer Brother    Diabetes Mother    HIV Brother    Kidney disease Paternal Uncle, Cousin    No Known Problems Sister, Daughter, Sister, Brother, Brother    Stroke Maternal Aunt         Tobacco Use    Smoking status: Former Smoker     Years: 40.00     Types: Cigarettes     Last attempt to quit: 2010     Years since quittin.1    Smokeless tobacco: Never Used   Substance and Sexual Activity    Alcohol use: No     Comment: stopped ETOH in     Drug use: No     Comment: THC in youth    Sexual activity: Yes     Partners: Female     Birth control/protection: None     Review of Systems   Constitution: Negative for chills, decreased appetite and malaise/fatigue.   HENT: Negative for congestion.    Cardiovascular: Positive for near-syncope and palpitations. Negative for chest pain, dyspnea on exertion, irregular heartbeat, leg swelling, paroxysmal nocturnal dyspnea and syncope.   Respiratory: Negative for cough and shortness of breath.    Endocrine: Negative for cold intolerance and heat intolerance.   Skin: Negative for rash.   Musculoskeletal: Negative for arthritis and back pain.   Gastrointestinal: Negative for abdominal pain, constipation and diarrhea.   Genitourinary: Negative for dysuria and hematuria.   Neurological: Negative for dizziness and headaches.   Psychiatric/Behavioral: Negative for altered mental status.     Objective:     Vital Signs (Most Recent):  Temp: 98.5 °F (36.9 °C) (19)  Pulse: 64 (19 2314)  Resp: 18 (19)  BP: (!) 121/90 (19)  SpO2: 98 % (19) Vital Signs (24h Range):  Temp:  [98.4 °F (36.9 °C)-99.5 °F (37.5 °C)] 98.5 °F (36.9 °C)  Pulse:  [64-79] 64  Resp:  [17-20] 18  SpO2:  [95 %-100 %] 98 %  BP: (117-135)/(74-90) 121/90       Weight: 67.2 kg (148 lb 2.4 oz)  Body mass index is 20.66 kg/m².    SpO2: 98 %  O2 Device (Oxygen Therapy): room air    Physical Exam   Constitutional: He is oriented to person, place, and time. He appears well-developed and well-nourished. No distress.   HENT:   Head: Normocephalic.   Left Ear: External ear normal.   Eyes: Pupils are equal, round, and reactive to light. Right eye  exhibits no discharge. Left eye exhibits no discharge.   Neck: Normal range of motion. No thyromegaly present.   Cardiovascular: Normal rate and regular rhythm. Exam reveals no friction rub.   No murmur heard.  Pulmonary/Chest: Effort normal and breath sounds normal. No respiratory distress. He has no wheezes.   Abdominal: Soft. Bowel sounds are normal. He exhibits no distension. There is no tenderness.   Musculoskeletal: Normal range of motion. He exhibits no edema.   Neurological: He is alert and oriented to person, place, and time. No cranial nerve deficit.   Skin:        Prior AV Fistula        Significant Labs:   CBC:   Recent Labs   Lab 08/21/19  0425   WBC 7.22   HGB 12.1*   HCT 37.8*

## 2019-08-22 NOTE — BRIEF OP NOTE
Patient is s/p slow pathway modification:    Tolerated procedure well. No acute complication noted.  Post op care per protocol.  Will monitor in recovery on tele overnight  Access: b/l CFV, hemostasis b/l manual pressure   Fu EKG  EP service to follow

## 2019-08-22 NOTE — ASSESSMENT & PLAN NOTE
- Symptomatic ventricular premature beats (VPB/PVCc) with palpitation, lightheadedness and presyncope   - His PVC could be related to structural heart disease as his TTE showed Hypertrophic subaortic Stenosis and outflow tract obstruction   - Tele showed frequent PVC in couplets; however, there is no hemodynamic compromise  - Continue on Beta blocker (metoprolol tartrate 25 mg PO BID)  - NPO midnight, for tentative plan for radiofrequency catheter ablation with Dr. Mcmanus.

## 2019-08-22 NOTE — PLAN OF CARE
Problem: Adult Inpatient Plan of Care  Goal: Plan of Care Review  Outcome: Ongoing (interventions implemented as appropriate)  Plan of care discussed with patient.  Patient ambulating independently, fall precautions in place. Patient has no complaints of pain. Discussed medications and care. Ablation preformed today. Patient has no questions at this time. Will continue to monitor.

## 2019-08-22 NOTE — PLAN OF CARE
Problem: Adult Inpatient Plan of Care  Goal: Plan of Care Review  Outcome: Ongoing (interventions implemented as appropriate)  Patient free from falls and injuries throughout shift.  AAO and VSS.  Patient denies chest pain and SOB.    Patient continues on prednisone 5mg and metoprolol 12.5 mg BID.  Patient NPO for possible ablation.  K+ 3.5, phos 2.1, and Mg 2.0.  BUN/Cre 23/1.5.  No acute events overnight. Patient resting well at this time.  Plan of care discussed with patient.  Patient verbalizes understanding.  Will continue to monitor.

## 2019-08-22 NOTE — ANESTHESIA POSTPROCEDURE EVALUATION
Anesthesia Post Evaluation    Patient: Alin Burkett    Procedure(s) Performed: Procedure(s) (LRB):  ABLATION, SVT (N/A)    Final Anesthesia Type: general  Patient location during evaluation: PACU  Patient participation: Yes- Able to Participate  Level of consciousness: awake and alert  Post-procedure vital signs: reviewed and stable  Pain management: adequate  Airway patency: patent  PONV status at discharge: No PONV  Anesthetic complications: no      Cardiovascular status: blood pressure returned to baseline  Respiratory status: unassisted  Hydration status: euvolemic  Follow-up not needed.          Vitals Value Taken Time   /96 8/22/2019  5:58 PM   Temp 36.6 °C (97.9 °F) 8/22/2019  4:50 PM   Pulse 68 8/22/2019  6:10 PM   Resp 18 8/22/2019  6:10 PM   SpO2 100 % 8/22/2019  6:10 PM   Vitals shown include unvalidated device data.      No case tracking events are documented in the log.      Pain/Raheem Score: Pain Rating Prior to Med Admin: 8 (8/22/2019  5:18 PM)

## 2019-08-22 NOTE — H&P
HISTORY & PHYSICAL  Hospital Medicine    Team: [facility] HOSP MED C    PRESENTING HISTORY     Chief Complaint/Reason for Admission:  Evaluation for supraventricular tachycardia    History of Present Illness:  Patient of concern is a 69M essential HTN, acquired hypothyroidism, AAA s/p repair, hx kidney transplant in 2016 for ESRD due to HTN and congenital absent left kidney (on prograf, cellcept, and prednisone), now with CKD stage 3 c/b anemia of chronic kidney disease here for evaluation of supraventricular tachycardia. The patient presented originally to [facility] with palpitations and burning chest discomfort and was later transferred to [facility] for [facility] to evaluate an elevated troponin. Dr. Simons states that the patient has had palpitations on and off for a year with recurrent syncopal episodes, most recently increased in frequency after his metoprolol was discontinued due to hypotension. The chest discomfort prompting this presentation started after a meal, and he originally attributed them to reflux, taking an antacid with partial relief of the chest pain. Initial workup significant for SVT, for which he was given adenosine at [facility] with resolution, followed later by resuming his metoprolol tartrate 12.5 mg BID. Workup at the time significant for elevated troponin at 1.162, for which [facility] a left heart catheterization.      His troponin has continued to rise, most recently elevated at 6.9. Most recent ECG showing 1st degree AV block with PACs and LVH. He underwent TTE on 8/20 that showed concentric LVH, LVEF 65%, grade II diastolic dysfunction, and idiopathic hypertrophic subaortic stenosis with FRANCISCO and outflow obstruction (full report copied below). [facility] official report is pending, but per Dr. Simons had essentially normal coronary arteries. He is now stable on beta blockade with no recurrent chest pain or palpitations. Dr. Simons and Dr. Mcmanus discussed the  case, who [facility]      [place] [facility] is doing well. Seen at bedside with significant other. He offers no complaints and is joking around.     TTE performed 8/20:  · Concentric left ventricular hypertrophy.  · Normal left ventricular systolic function. The estimated ejection fraction is 65%  · Grade II (moderate) LVDD consistent with pseudonormalization.  · Elevated left atrial pressure.  · Moderate outflow tract left ventricular obstruction is present.  · No wall motion abnormalities.  · Normal right ventricular systolic function.  · Mild mitral regurgitation.  · Mild to moderate tricuspid regurgitation.  · Mild pulmonary hypertension present.  · Normal central venous pressure (3 mm Hg).  · The estimated PA systolic pressure is 41 mm Hg    Review of Systems:    Review of Systems   Constitutional: Negative for chills and fever.   HENT: Negative for congestion and sore throat.    Eyes: Negative for photophobia, pain and discharge.   Respiratory: Negative for cough, hemoptysis, sputum production and shortness of breath.    Cardiovascular: Negative for chest pain, palpitations and leg swelling.   Gastrointestinal: Negative for abdominal pain, diarrhea, nausea and vomiting.   Genitourinary: Negative for dysuria and urgency.   Musculoskeletal: Negative for myalgias and neck pain.   Skin: Negative for itching and rash.   Neurological: Negative for sensory change, focal weakness and headaches.   Endo/Heme/Allergies: Negative for polydipsia. Does not bruise/bleed easily.   Psychiatric/Behavioral: Negative for depression and suicidal ideas.       PAST HISTORY:     Past Medical History:   Diagnosis Date    Acidosis     Adrenal adenoma     Anemia associated with chronic renal failure     Arrhythmia, onset 1995 5/1/2015    Awaiting organ transplant status 11/26/2013    Basal cell carcinoma 06/12/2012    left nasal tip    Blood type B+ 11/26/2013    Calcium  nephrolithiasis 10/16/2012    Cancer     Celiac artery dissection     Chronic diarrhea     Chronic urethral stricture     Congenital absence of kidney     left    -donor kidney transplant 16     Induced w Campath 30 mg IV intraoperatively & SoluMedrol 875 mg total over 3 days.  Renal allograft biopsy 17 (DIVINE): 21 glomeruli, none globally sclerosed, <5% interstitial fibrosis, no ACR, c4d negative, AVR CCT Type 2 (V1 lesion); plan THYMO     Dissecting aortic aneurysm (any part), abdominal     Diverticulosis     Encounter for blood transfusion     ESRD (end stage renal disease) 2010    H/O urethral stricture 2018    H/O: urethral stricture     History of AAA (abdominal aortic aneurysm) repair     History of urethral stricture 2018    Hypertension     Hypokalemia     Hypothyroidism 1/10/2014    Inguinal hernia bilateral, non-recurrent     Kidney stones     Organ transplant candidate 2013    Plantar warts 1/10/2014    Recurrent UTI 2017    S/P kidney transplant     Secondary hyperparathyroidism, renal     Thyroid disease        Past Surgical History:   Procedure Laterality Date    ABDOMINAL SURGERY      exploratory lapatomy x 2    AORTA - SUPERIOR MESENTERIC ARTERY BYPASS GRAFT      BIOPSY - Ultrasound guided N/A 2017    [facility]ND F[facility]    BLADDER NECK RECONSTRUCTION      BLADDER SURGERY      COLONOSCOPY  10/10/2013    Dr. Gutierrez, repeat in 5 years    COLONOSCOPY N/A 10/10/2013    [facility] ENDO    CYSTOSCOPY      CYSTOSCOPY N/A 2018    [facility]    CYSTOSCOPY N/A 2017    [facility]    CYSTOSCOPY N/A 2017    [facility]    CYSTOSCOPY, WITH DIRECT VISION INTERNAL URETHROTOMY N/A 2018    [facility] at [facility] OR 1ST  FLR    DILATION, URETHRA N/A 12/19/2018    [facility]    GASTROJEJUNOSTOMY      HEMORRHOID SURGERY      HERNIA REPAIR      KIDNEY TRANSPLANT      Left heart cath Left 8/20/2019    [facility]    LITHOTRIPSY      PERCUTANEOUS NEPHROLITHOTRIPSY      right  ESWL  10/31/12    right ESWL  6/26/12    TRANSPLANT-KIDNEY N/A 11/26/2016    [facility]    URETHROGRAM-RETROGRADE N/A 12/27/2017    [facility]    URETHROTOMY-DIRECT VISUAL INTERNAL (DVIU) N/A 12/27/2017    [facility]    URETHROTOMY-DIRECT VISUAL INTERNAL (DVIU) N/A 7/19/2017    [facility]       Family History   Problem Relation Age of Onset    Diabetes Mother     Alzheimer's disease Mother     Alcohol abuse Father     HIV Brother     Stroke Maternal Aunt     Kidney disease Paternal Uncle     Kidney disease Cousin     No Known Problems Sister     No Known Problems Daughter     No Known Problems Sister     No Known Problems Brother     No Known Problems Brother     Cancer Brother         thyroid cancer    Melanoma Neg Hx     Psoriasis Neg Hx     Lupus Neg Hx     Eczema Neg Hx     Colon cancer Neg Hx     Colon polyps Neg Hx     Crohn's disease Neg Hx     Ulcerative colitis Neg Hx     Celiac disease Neg Hx        Social History     Socioeconomic History    Marital status: Significant Other     Spouse name: Not on file    Number of children: Not on file    Years of education: Not on file    Highest education level: Not on file   Occupational History     Employer: Disabled   Social Needs    Financial resource strain: Not on file    Food insecurity:     Worry: Not on file     Inability: Not on file    Transportation needs:     Medical: Not on file     Non-medical: Not on file   Tobacco  Use    Smoking status: Former Smoker     Years: 40.00     Types: Cigarettes     Last attempt to quit: 2010     Years since quittin.1    Smokeless tobacco: Never Used   Substance and Sexual Activity    Alcohol use: No     Comment: stopped ETOH in     Drug use: No     Comment: THC in youth    Sexual activity: Yes     Partners: Female     Birth control/protection: None   Lifestyle    Physical activity:     Days per week: Not on file     Minutes per session: Not on file    Stress: Not on file   Relationships    Social connections:     Talks on phone: Not on file     Gets together: Not on file     Attends Yarsani service: Not on file     Active member of club or organization: Not on file     Attends meetings of clubs or organizations: Not on file     Relationship status: Not on file   Other Topics Concern    Not on file   Social History Narrative    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       MEDICATIONS & ALLERGIES:     Current Facility-Administered Medications on File Prior to Encounter   Medication Dose Route Frequency Provider Last Rate Last Dose    [COMPLETED] potassium chloride CR capsule 30 mEq  30 mEq Oral Once Antwon Simons MD   30 mEq at 19 1025    [DISCONTINUED] calcitRIOL capsule 0.25 mcg  0.25 mcg Oral Daily FELICIANO Camacho MD   0.25 mcg at 19 1025    [DISCONTINUED] famotidine tablet 20 mg  20 mg Oral QHS FELICIANO Camacho MD   20 mg at 19 2046    [DISCONTINUED] fentaNYL injection    PRN Javan Oscar MD   25 mcg at 19 1401    [DISCONTINUED] HYDROcodone-acetaminophen 5-325 mg per tablet 1 tablet  1 tablet Oral Q8H PRN Javan Oscar MD   1 tablet at 19 1615    [DISCONTINUED] iohexol (OMNIPAQUE 350) injection    PRN Javan Oscar MD   250 mL at 19 1418    [DISCONTINUED] ketoconazole split tablet 100 mg  100 mg Oral Daily FELICIANO Camacho MD        [DISCONTINUED] levothyroxine tablet 100 mcg  100 mcg Oral Daily FELICIANO Miranda  MD Janice   100 mcg at 08/21/19 1025    [DISCONTINUED] lidocaine (PF) 10 mg/ml (1%) injection    PRN Javan Oscar MD   10 mL at 08/20/19 1403    [DISCONTINUED] magnesium oxide tablet 400 mg  400 mg Oral Daily FELICIANO Camacho MD   400 mg at 08/21/19 1026    [DISCONTINUED] metoprolol tartrate (LOPRESSOR) split tablet 12.5 mg  12.5 mg Oral BID FELICIANO Camacho MD   12.5 mg at 08/21/19 2046    [DISCONTINUED] midazolam (VERSED) 1 mg/mL injection    PRN Javan Oscar MD   1 mg at 08/20/19 1403    [DISCONTINUED] morphine injection 1 mg  1 mg Intravenous Q4H PRN FELICIANO Camacho MD   1 mg at 08/20/19 1900    [DISCONTINUED] mycophenolate capsule 750 mg  750 mg Oral BID FELICIANO Camacho MD   750 mg at 08/21/19 2046    [DISCONTINUED] predniSONE tablet 5 mg  5 mg Oral Daily FELICIANO Camacho MD   5 mg at 08/21/19 1026    [DISCONTINUED] sodium bicarbonate tablet 650 mg  650 mg Oral BID FELICIANO Camacho MD   650 mg at 08/21/19 2046    [DISCONTINUED] tacrolimus capsule 1 mg  1 mg Oral Daily FELICIANO Camacho MD   1 mg at 08/21/19 1026     Current Outpatient Medications on File Prior to Encounter   Medication Sig Dispense Refill    aspirin 325 MG tablet Take 1 tablet (325 mg total) by mouth once daily.  0    calcitRIOL (ROCALTROL) 0.25 MCG Cap Take 1 capsule (0.25 mcg total) by mouth once daily. 30 capsule 11    famotidine (PEPCID) 20 MG tablet Take 1 tablet (20 mg total) by mouth every evening. 90 tablet 3    levothyroxine (SYNTHROID) 100 MCG tablet Take 1 tablet (100 mcg total) by mouth once daily. 90 tablet 3    magnesium oxide (MAG-OX) 400 mg (241.3 mg magnesium) tablet Take 1 tablet (400 mg total) by mouth once daily. 90 tablet 3    metoprolol tartrate (LOPRESSOR) 25 MG tablet Take 0.5 tablets (12.5 mg total) by mouth 2 (two) times daily. 90 tablet 3    multivitamin (ONE DAILY MULTIVITAMIN) per tablet Take 1 tablet by mouth once daily.      mycophenolate (CELLCEPT) 250 mg Cap Take 3 capsules (750 mg total) by  "mouth 2 (two) times daily. Z94.0/Kidney Transplant on 11/26/16 540 capsule 3    predniSONE (DELTASONE) 5 MG tablet Take 1 tablet (5 mg total) by mouth once daily. Z94.0/Kidney transplant on 11/26/2016 90 tablet 3    sodium bicarbonate 650 MG tablet Take 1 tablet (650 mg total) by mouth 2 (two) times daily. 540 tablet 3    tacrolimus (PROGRAF) 1 MG Cap Take 3 capsules (3 mg total) by mouth every morning AND 2 capsules (2 mg total) every evening. Z94.0/Kidney Transplant on 11/26/16. 450 capsule 3    k phos di & mono-sod phos mono (K-PHOS-NEUTRAL) 250 mg Tab Take 3 tablets by mouth 2 (two) times daily. 480 tablet 11    ketoconazole (NIZORAL) 200 mg Tab Take 0.5 tablets (100 mg total) by mouth once daily. 45 tablet 3        Review of patient's allergies indicates:  No Known Allergies    OBJECTIVE:     Vital Signs:  Temp:  [98.4 °F (36.9 °C)-99.5 °F (37.5 °C)] 98.5 °F (36.9 °C)  Pulse:  [62-79] 64  Resp:  [16-20] 18  SpO2:  [95 %-100 %] 98 %  BP: (117-135)/(74-90) 121/90  Body mass index is 20.66 kg/m².     Physical Exam:    Objective:  General Appearance:  Comfortable, well-appearing, in no acute distress and not in pain.    Vital signs: (most recent): Blood pressure (!) 121/90, pulse 74, temperature 98.5 °F (36.9 °C), temperature source Oral, resp. rate 18, height 5' 11" (1.803 m), weight 67.2 kg (148 lb 2.4 oz), SpO2 98 %.  No fever.    Output: Producing urine and producing stool.    HEENT: Normal HEENT exam.    Lungs:  Normal effort.  Breath sounds clear to auscultation.  He is not in respiratory distress.  No rales or wheezes.    Heart: Normal rate.  Regular rhythm.  S1 normal and S2 normal.  No murmur.   Chest: Symmetric chest wall expansion.   Abdomen: Abdomen is soft.  Bowel sounds are normal.   There is no abdominal tenderness.     Extremities: Normal range of motion.  There is no deformity, effusion, dependent edema or local swelling.    Pulses: Distal pulses are intact.    Neurological: Patient is alert " and oriented to person, place and time.  Normal strength.    Pupils:  Pupils are equal, round, and reactive to light.    Skin:  Warm and dry.      Laboratory  Lab Results   Component Value Date    WBC 7.22 2019    HGB 12.1 (L) 2019    HCT 37.8 (L) 2019    MCV 94 2019     2019     No results for input(s): GLU, NA, K, CL, CO2, BUN, CREATININE, CALCIUM, MG in the last 24 hours.  Lab Results   Component Value Date    INR 1.1 2019    INR 0.9 2017    INR 1.0 01/10/2017     Lab Results   Component Value Date    HGBA1C 5.3 2018     No results for input(s): POCTGLUCOSE in the last 72 hours.    Diagnostic Results:  Labs: Reviewed  ECG: Reviewed  X-Ray: Reviewed  Echo: Reviewed    ASSESSMENT & PLAN:     Current Problems List:  Active Hospital Problems    Diagnosis  POA    SVT (supraventricular tachycardia) [I47.1]  Yes     Chronic    Elevated troponin [R74.8]  Yes    Stage 3 chronic kidney disease [N18.3]  Yes     Chronic    -donor kidney transplant 16 [Z94.0]  Not Applicable     Chronic     Induced w Campath 30 mg IV intraoperatively & SoluMedrol 875 mg total over 3 days.   Renal allograft biopsy 17 (DIVINE): 21 glomeruli, none globally sclerosed, <5% interstitial fibrosis, no ACR, c4d negative, AVR CCT Type 2 (V1 lesion); plan THYMO   Renal allograft biopsy 17 (follow-up after rejection treatment): 27 glomeruli, no AVR, no ACR, C4d negative, no microcirculation changes, 5-10% interstitial fibrosis; ?cast --> further studies pending      Long-term use of immunosuppressant medication [Z79.899]  Not Applicable    LVH (left ventricular hypertrophy) due to hypertensive disease [I11.9]  Yes    Hypothyroidism [E03.9]  Yes     Chronic    Essential hypertension [I10]  Yes     Chronic    Anemia of chronic disease [D63.8]  Yes     Chronic    Secondary hyperparathyroidism, renal [N25.81]  Yes     Chronic      Resolved Hospital Problems   No resolved  problems to display.     Problem Assessment & Treatment Plan:    Paroxysmal supraventricular tachycardia  Elevated troponin   - Adenosine responsive  - EP consulted for ablation  - C/w BB  - Add ASA  - NPOpMN for possible procedures    Idiopathic hypertrophic subaortic stenosis  - Will need to determine if further studies are warranted to determine outflow gradient, i.e. Repeat 2D ECHO with doppler  - Discuss with EP should comanagement consultation be indicated     donor kidney transplant 2016  Chronic kidney disease, stage III  Long term use immunosuppressant  Secondary hyperparathyroidism, renal  - Continue regimen of prograf, cellcept, and prednisone  - Daily prograf level ordered  - Consider renal transplant sonogram as indicated in consultation with KTM  - Continue sodium bicarbonate  - C/w calcitriol    Essential hypertension  - Currently stable  - Continue to monitor blood pressure while receiving beta-blockade    Hypothyroidism, acquired  - TSH appropriate  - Continue home levothyroxine    Anemia of chronic kidney disease, stage III  - Currently stable    DVT PPx - hold in setting possible procedures  Cardiac diet, NPOpMN  FULL CODE    Dispo: Pending EP evaluation    Lake Damico MD  [facility] Medicine

## 2019-08-22 NOTE — ANESTHESIA PREPROCEDURE EVALUATION
2019  Pre-operative evaluation for Procedure(s):  SVT Ablation     Alin Burkett is a 69 y.o. male with a hx of LOUISA, BPH, & ESRD 2/2 to HTN and has been on HD since  on a TTS schedule via a LUE AV fistula.  Previous AAA repair.  Now scheduled for the above procedure.        Patient Active Problem List   Diagnosis    Adrenal adenoma    Essential hypertension    Congenital absence of kidney    Anemia of chronic disease    Secondary hyperparathyroidism, renal    Hypothyroidism    Plantar warts    Arrhythmia, onset     History of smoking 10-25 pack years, quit , 20 pack-years    History of alcohol abuse, quit     LOUISA (obstructive sleep apnea), CPAP refused    LVH (left ventricular hypertrophy) due to hypertensive disease    Acute coronary syndrome    BPH (benign prostatic hyperplasia)    Long-term use of immunosuppressant medication    -donor kidney transplant 16    Hypophosphatemia    Stage 3 chronic kidney disease    Urethral stricture    Recurrent UTI    Bladder diverticulum    Thrombocytopenia    Abdominal aortic atherosclerosis    Abdominal aortic aneurysm (AAA) without rupture    SVT (supraventricular tachycardia)    AMI (acute myocardial infarction)    Elevated troponin       Review of patient's allergies indicates:  No Known Allergies    Current Facility-Administered Medications on File Prior to Visit   Medication Dose Route Frequency Provider Last Rate Last Dose    acetaminophen tablet 650 mg  650 mg Oral Q4H PRN Lake Damico MD        aspirin EC tablet 81 mg  81 mg Oral Daily Lake Damico MD   81 mg at 19 0912    calcitRIOL capsule 0.25 mcg  0.25 mcg Oral Daily Lake Damico MD   0.25 mcg at 19 0913    famotidine tablet 20 mg  20 mg Oral QHS FELICIANO Camacho MD        HYDROcodone-acetaminophen 5-325 mg per  tablet 1 tablet  1 tablet Oral Q8H PRN FELICIANO Camacho MD        ketoconazole split tablet 100 mg  100 mg Oral Daily FELICIANO Camacho MD        levothyroxine tablet 100 mcg  100 mcg Oral Daily FELICIANO Camacho MD   100 mcg at 08/22/19 0700    magnesium oxide tablet 400 mg  400 mg Oral Daily FELICIANO Camacho MD        metoprolol tartrate (LOPRESSOR) split tablet 12.5 mg  12.5 mg Oral BID FELICIANO Camacho MD   12.5 mg at 08/22/19 0913    mycophenolate capsule 750 mg  750 mg Oral BID FELICIANO Camacho MD   750 mg at 08/22/19 0914    ondansetron disintegrating tablet 8 mg  8 mg Oral Q8H PRN Lake Damico MD        polyethylene glycol packet 17 g  17 g Oral BID PRN Lake Damico MD        predniSONE tablet 5 mg  5 mg Oral Daily FELICIANO Camacho MD   5 mg at 08/22/19 0914    promethazine tablet 25 mg  25 mg Oral Q6H PRN Lake Damico MD        sodium bicarbonate tablet 650 mg  650 mg Oral BID FELICIANO Camacho MD   650 mg at 08/22/19 0913    sodium chloride 0.9% flush 10 mL  10 mL Intravenous PRN Lake Damico MD        tacrolimus capsule 2 mg  2 mg Oral Daily Lake Damico MD        tacrolimus capsule 3 mg  3 mg Oral Daily Lake Damico MD   3 mg at 08/22/19 0912     Current Outpatient Medications on File Prior to Visit   Medication Sig Dispense Refill    aspirin 325 MG tablet Take 1 tablet (325 mg total) by mouth once daily.  0    calcitRIOL (ROCALTROL) 0.25 MCG Cap Take 1 capsule (0.25 mcg total) by mouth once daily. 30 capsule 11    famotidine (PEPCID) 20 MG tablet Take 1 tablet (20 mg total) by mouth every evening. 90 tablet 3    k phos di & mono-sod phos mono (K-PHOS-NEUTRAL) 250 mg Tab Take 3 tablets by mouth 2 (two) times daily. 480 tablet 11    ketoconazole (NIZORAL) 200 mg Tab Take 0.5 tablets (100 mg total) by mouth once daily. 45 tablet 3    levothyroxine (SYNTHROID) 100 MCG tablet Take 1 tablet (100 mcg total) by mouth once daily. 90 tablet 3    magnesium oxide (MAG-OX) 400 mg  (241.3 mg magnesium) tablet Take 1 tablet (400 mg total) by mouth once daily. 90 tablet 3    metoprolol tartrate (LOPRESSOR) 25 MG tablet Take 0.5 tablets (12.5 mg total) by mouth 2 (two) times daily. 90 tablet 3    multivitamin (ONE DAILY MULTIVITAMIN) per tablet Take 1 tablet by mouth once daily.      mycophenolate (CELLCEPT) 250 mg Cap Take 3 capsules (750 mg total) by mouth 2 (two) times daily. Z94.0/Kidney Transplant on 11/26/16 540 capsule 3    predniSONE (DELTASONE) 5 MG tablet Take 1 tablet (5 mg total) by mouth once daily. Z94.0/Kidney transplant on 11/26/2016 90 tablet 3    sodium bicarbonate 650 MG tablet Take 1 tablet (650 mg total) by mouth 2 (two) times daily. 540 tablet 3    tacrolimus (PROGRAF) 1 MG Cap Take 3 capsules (3 mg total) by mouth every morning AND 2 capsules (2 mg total) every evening. Z94.0/Kidney Transplant on 11/26/16. 450 capsule 3       Past Surgical History:   Procedure Laterality Date    ABDOMINAL SURGERY      exploratory lapatomy x 2    AORTA - SUPERIOR MESENTERIC ARTERY BYPASS GRAFT      BIOPSY - Ultrasound guided N/A 1/23/2017    Performed [facility] at [facility] OR 2ND FLR    BLADDER NECK RECONSTRUCTION      BLADDER SURGERY      COLONOSCOPY  10/10/2013    Dr. Gutierrez, repeat in 5 years    COLONOSCOPY N/A 10/10/2013    Performed by [facility] at [facility] ENDO    CYSTOSCOPY      CYSTOSCOPY N/A 12/19/2018    Performed by Dewey Mann [facility] at [facility] OR 1ST FLR    CYSTOSCOPY N/A 12/27/2017    Performed by [facility]wey Mann [facility] at [facility] OR 1ST FLR    CYSTOSCOPY N/A 7/19/2017    Performed by [facility] at [facility] OR 1ST FLR    CYSTOSCOPY, WITH DIRECT VISION INTERNAL URETHROTOMY N/A 12/19/2018    Performed by [facility] Mann [facility] at [facility] OR 1ST FLR    DILATION, URETHRA N/A 12/19/2018    Performed by [facility] Mann [facility] at [facility] OR 1ST FLR    GASTROJEJUNOSTOMY      HEMORRHOID SURGERY      HERNIA REPAIR      KIDNEY TRANSPLANT      Left heart cath Left 8/20/2019     [facility]ATH[facility]    LITHOTRIPSY      PERCUTANEOUS NEPHROLITHOTRIPSY      right  ESWL  10/31/12    right ESWL  12    TRANSPLANT-KIDNEY N/A 2016    Performed by [facility] Jr[facility] at [facility] OR 2ND FLR    URETHROGRAM-RETROGRADE N/A 2017    [facility] at [facility] OR 1ST FLR    URETHROTOMY-DIRECT VISUAL INTERNAL (DVIU) N/A 2017    [facility] at [facility] OR 1ST FLR    URETHROTOMY-DIRECT VISUAL INTERNAL (DVIU) N/A 2017    Performed by [facility]wey Mann [facility] at [facility] OR 1ST FLR       Social History     Socioeconomic History    Marital status: Significant Other     Spouse name: Not on file    Number of children: Not on file    Years of education: Not on file    Highest education level: Not on file   Occupational History     Employer: Disabled   Social Needs    Financial resource strain: Not on file    Food insecurity:     Worry: Not on file     Inability: Not on file    Transportation needs:     Medical: Not on file     Non-medical: Not on file   Tobacco Use    Smoking status: Former Smoker     Years: 40.00     Types: Cigarettes     Last attempt to quit: 2010     Years since quittin.1    Smokeless tobacco: Never Used   Substance and Sexual Activity    Alcohol use: No     Comment: stopped ETOH in     Drug use: No     Comment: THC in youth    Sexual activity: Yes     Partners: Female     Birth control/protection: None   Lifestyle    Physical activity:     Days per week: Not on file     Minutes per session: Not on file    Stress: Not on file   Relationships    Social connections:     Talks on phone: Not on file     Gets together: Not on file     Attends [employer] service: Not on file     Active member of club or organization: Not on file     Attends meetings of clubs or organizations: Not on file     Relationship status: Not on file   Other Topics Concern    Not on file   Social History  Narrative    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 Rate : 061 BPM     Atrial Rate : 061 BPM     P-R Int : 240 ms          QRS Dur : 094 ms      QT Int : 428 ms       P-R-T Axes : 060 057 103 degrees     QTc Int : 430 ms    Sinus rhythm with 1st degree A-V block  Minimal voltage criteria for LVH, may be normal variant  T wave abnormality, consider lateral ischemia  Abnormal ECG  When compared with ECG of 01-MAY-2015 12:11,  Premature ventricular complexes are no longer Present  Confirmed by BELINDA DELGADILLO MD ([id number]) on 6/21/2016 7:45:51 PM    Referred By: SELF REFERRAL           Confirmed By:BELINDA DELGADILLO MD    2D Echo:  CONCLUSIONS     1 - Concentric hypertrophy.     2 - Normal left ventricular systolic function (EF 65-70%).     3 - Left ventricular diastolic dysfunction.     4 - Trivial mitral regurgitation.     5 - The estimated PA systolic pressure is 23 mmHg.     6 - Normal right ventricular systolic function .     7 - Right ventricular hypertrophy.     8 - Trivial tricuspid regurgitation.     9 - Mild pulmonic regurgitation.       This document has been electronically    SIGNED BY: Norbert Daley MD On: 09/01/2016 10:45      Pre-op Assessment    I have reviewed the Patient Summary Reports.      I have reviewed the Medications.     Review of Systems  Anesthesia Hx:  History of prior surgery of interest to airway management or planning: Previous anesthesia: MAC Denies Family Hx of Anesthesia complications.   Denies Personal Hx of Anesthesia complications.   Hematology/Oncology:     Oncology Normal    -- Anemia:   EENT/Dental:EENT/Dental Normal   Cardiovascular:   Denies Pacemaker. Hypertension Dysrhythmias  CHF (HFpEF)    Pulmonary:   Sleep Apnea    Renal/:   Chronic Renal Disease, ESRD, Dialysis    Musculoskeletal:  Musculoskeletal Normal    Endocrine:   Hypothyroidism        Physical Exam  General:  Well nourished    Airway/Jaw/Neck:  Airway Findings: Mouth Opening: Normal  Tongue: Normal  General Airway Assessment: Adult  Mallampati: II  Improves to II with phonation.  TM Distance: Normal, at least 6 cm      Dental:  Dental Findings: In tact   Chest/Lungs:  Chest/Lungs Findings: Normal Respiratory Rate     Heart/Vascular:  Heart Findings: Rate: Normal  Rhythm: Regular Rhythm  Vascular Findings:  Vascular Access: Peripheral IV(s)  Dialysis Access: AV Fistula LUE        Mental Status:  Mental Status Findings:  Cooperative, Alert and Oriented         Anesthesia Plan  Type of Anesthesia, risks & benefits discussed:  Anesthesia Type:  general, MAC  Patient's Preference:   Intra-op Monitoring Plan: standard ASA monitors  Intra-op Monitoring Plan Comments:   Post Op Pain Control Plan: per primary service following discharge from PACU and IV/PO Opioids PRN  Post Op Pain Control Plan Comments:   Induction:   IV  Beta Blocker:  Patient is on a Beta-Blocker and has received one dose within the past 24 hours (No further documentation required).       Informed Consent: Patient understands risks and agrees with Anesthesia plan.  Questions answered. Anesthesia consent signed with patient.  ASA Score: 4     Day of Surgery Review of History & Physical:    H&P update referred to the surgeon.         Ready For Surgery From Anesthesia Perspective.

## 2019-08-22 NOTE — PLAN OF CARE
08/22/19 1221   Discharge Assessment   Assessment Type Discharge Planning Assessment   Confirmed/corrected address and phone number on facesheet? Yes   Assessment information obtained from? Patient;Medical Record   Expected Length of Stay (days) 1   Communicated expected length of stay with patient/caregiver yes   Prior to hospitilization cognitive status: Alert/Oriented   Prior to hospitalization functional status: Independent   Current cognitive status: Alert/Oriented   Current Functional Status: Independent   Facility Arrived From: [facility]   Lives With significant other   Able to Return to Prior Arrangements yes   Is patient able to care for self after discharge? Yes   Patient's perception of discharge disposition home or selfcare   Readmission Within the Last 30 Days no previous admission in last 30 days   Patient currently being followed by outpatient case management? No   Patient currently receives any other outside agency services? No   Equipment Currently Used at Home none   Do you have any problems affording any of your prescribed medications? TBD   Is the patient taking medications as prescribed? yes   Does the patient have transportation home? Yes   Transportation Anticipated family or friend will provide   Does the patient receive services at the Coumadin Clinic? No   Discharge Plan A Home with family   DME Needed Upon Discharge  none   Patient/Family in Agreement with Plan yes   Transferred [facility] for SVT. Lives with SO and is independent in his ADLs. Plan is to DC home. No DC needs identified.

## 2019-08-22 NOTE — HPI
Mr. Burkett is a 69-year-old man with HTN, AAA s/p repair; hypothyroidism ESRD (KTM on 2016 on immunosuppressive therapy). He presented initially to [facility] with frequent palpitations, presyncope and chest discomfort. He was riding his bicycle when the events of being lightheaded occured last week 8/15/2019. He was about to ride his bicycle when felt lightheadedness and blurry vision and fell on fround without fully losing his consciousness. When he arrived home after this episode, he contacted [facility] and they recommended to hold off on his Metoprolol as he was hypotensive (80-90/60 mmHg). On Monday 8/19/2019; he felt another episode of lighthedadedness and indigestion, his heart rate was on 120 BPM, and he went to [facility]. He was having SVT with rate ~ 157 BPM, and received one time of 6 mg IV Adenosine which converted him to sinus rhythm (with baseline 1st degree block). No strip while giving adenosine recorded. During his stay at [facility], he was having chest discomfort associated with elevated troponin. Transferred to [facility] for LHC. He underwent LHC on 8/20/2019 and showed nonobstructive coronaries.     Of note, his palpitation has been active for at least 3-4 years. He previously saw Dr. King back in 5/2015 with similar (although less frequent) palpitation, and underwent Holter monitor and it showed (very frequent mostly monomorphic PVCs totalling 60587 and averaging 539 per hour). He was on Metoprolol tartrate which resulted in controlling his palpitation (discontinued secondary to hypotension).       ECGs:   Prior to arrival and his syncopal episode: Sinus bradycardia with sinus arrhythmia with 1st degree A-V block  8/19/2019 On arrival to ED: SVT with V rate 157 BPM  8/19/2019 Post 6 mg IV Adenosine: Sinus rhythm with 1st degree A-V block with PACs     TTE: 8/20/2019  Concentric LVH, LVEF 65%, grade II diastolic dysfunction, and  Hypertrophic subaortic Stenosis with FRANCISCO and outflow tract obstruction and MR.

## 2019-08-23 PROBLEM — N39.0 URINARY TRACT INFECTION WITHOUT HEMATURIA: Status: ACTIVE | Noted: 2019-08-23

## 2019-08-23 LAB
ALBUMIN SERPL BCP-MCNC: 3.4 G/DL (ref 3.5–5.2)
ALP SERPL-CCNC: 63 U/L (ref 55–135)
ALT SERPL W/O P-5'-P-CCNC: 21 U/L (ref 10–44)
ANION GAP SERPL CALC-SCNC: 10 MMOL/L (ref 8–16)
AST SERPL-CCNC: 32 U/L (ref 10–40)
BACTERIA #/AREA URNS AUTO: ABNORMAL /HPF
BILIRUB SERPL-MCNC: 0.6 MG/DL (ref 0.1–1)
BILIRUB UR QL STRIP: NEGATIVE
BUN SERPL-MCNC: 16 MG/DL (ref 8–23)
CALCIUM SERPL-MCNC: 9.5 MG/DL (ref 8.7–10.5)
CHLORIDE SERPL-SCNC: 110 MMOL/L (ref 95–110)
CLARITY UR REFRACT.AUTO: ABNORMAL
CO2 SERPL-SCNC: 21 MMOL/L (ref 23–29)
COLOR UR AUTO: YELLOW
CREAT SERPL-MCNC: 1.4 MG/DL (ref 0.5–1.4)
EST. GFR  (AFRICAN AMERICAN): 59.3 ML/MIN/1.73 M^2
EST. GFR  (NON AFRICAN AMERICAN): 51.3 ML/MIN/1.73 M^2
GLUCOSE SERPL-MCNC: 78 MG/DL (ref 70–110)
GLUCOSE UR QL STRIP: NEGATIVE
HGB UR QL STRIP: ABNORMAL
KETONES UR QL STRIP: NEGATIVE
LEUKOCYTE ESTERASE UR QL STRIP: ABNORMAL
MAGNESIUM SERPL-MCNC: 2.1 MG/DL (ref 1.6–2.6)
MICROSCOPIC COMMENT: ABNORMAL
NITRITE UR QL STRIP: POSITIVE
PH UR STRIP: 6 [PH] (ref 5–8)
PHOSPHATE SERPL-MCNC: 1.8 MG/DL (ref 2.7–4.5)
POTASSIUM SERPL-SCNC: 4.1 MMOL/L (ref 3.5–5.1)
PROT SERPL-MCNC: 7.3 G/DL (ref 6–8.4)
PROT UR QL STRIP: NEGATIVE
RBC #/AREA URNS AUTO: 2 /HPF (ref 0–4)
SODIUM SERPL-SCNC: 141 MMOL/L (ref 136–145)
SP GR UR STRIP: 1.01 (ref 1–1.03)
SQUAMOUS #/AREA URNS AUTO: 0 /HPF
TACROLIMUS BLD-MCNC: 3.1 NG/ML (ref 5–15)
URN SPEC COLLECT METH UR: ABNORMAL
WBC #/AREA URNS AUTO: >100 /HPF (ref 0–5)
WBC CLUMPS UR QL AUTO: ABNORMAL

## 2019-08-23 PROCEDURE — 99232 PR SUBSEQUENT HOSPITAL CARE,LEVL II: ICD-10-PCS | Mod: ,,, | Performed by: NURSE PRACTITIONER

## 2019-08-23 PROCEDURE — 99233 PR SUBSEQUENT HOSPITAL CARE,LEVL III: ICD-10-PCS | Mod: GC,,, | Performed by: INTERNAL MEDICINE

## 2019-08-23 PROCEDURE — 63600175 PHARM REV CODE 636 W HCPCS: Performed by: HOSPITALIST

## 2019-08-23 PROCEDURE — 25000003 PHARM REV CODE 250: Performed by: FAMILY MEDICINE

## 2019-08-23 PROCEDURE — 80053 COMPREHEN METABOLIC PANEL: CPT

## 2019-08-23 PROCEDURE — 87086 URINE CULTURE/COLONY COUNT: CPT

## 2019-08-23 PROCEDURE — 99232 SBSQ HOSP IP/OBS MODERATE 35: CPT | Mod: ,,, | Performed by: NURSE PRACTITIONER

## 2019-08-23 PROCEDURE — 87186 SC STD MICRODIL/AGAR DIL: CPT

## 2019-08-23 PROCEDURE — 25000003 PHARM REV CODE 250: Performed by: NURSE PRACTITIONER

## 2019-08-23 PROCEDURE — 25000003 PHARM REV CODE 250: Performed by: HOSPITALIST

## 2019-08-23 PROCEDURE — 83735 ASSAY OF MAGNESIUM: CPT

## 2019-08-23 PROCEDURE — 63600175 PHARM REV CODE 636 W HCPCS: Performed by: FAMILY MEDICINE

## 2019-08-23 PROCEDURE — 87077 CULTURE AEROBIC IDENTIFY: CPT

## 2019-08-23 PROCEDURE — 87088 URINE BACTERIA CULTURE: CPT

## 2019-08-23 PROCEDURE — 63600175 PHARM REV CODE 636 W HCPCS: Performed by: NURSE PRACTITIONER

## 2019-08-23 PROCEDURE — 81001 URINALYSIS AUTO W/SCOPE: CPT

## 2019-08-23 PROCEDURE — 80197 ASSAY OF TACROLIMUS: CPT

## 2019-08-23 PROCEDURE — 99233 SBSQ HOSP IP/OBS HIGH 50: CPT | Mod: GC,,, | Performed by: INTERNAL MEDICINE

## 2019-08-23 PROCEDURE — 84100 ASSAY OF PHOSPHORUS: CPT

## 2019-08-23 PROCEDURE — 20600001 HC STEP DOWN PRIVATE ROOM

## 2019-08-23 RX ORDER — POTASSIUM CHLORIDE 750 MG/1
40 CAPSULE, EXTENDED RELEASE ORAL ONCE
Status: DISCONTINUED | OUTPATIENT
Start: 2019-08-23 | End: 2019-08-23

## 2019-08-23 RX ORDER — SODIUM,POTASSIUM PHOSPHATES 280-250MG
2 POWDER IN PACKET (EA) ORAL ONCE
Status: COMPLETED | OUTPATIENT
Start: 2019-08-23 | End: 2019-08-23

## 2019-08-23 RX ORDER — CEFTRIAXONE 1 G/1
1 INJECTION, POWDER, FOR SOLUTION INTRAMUSCULAR; INTRAVENOUS
Status: DISCONTINUED | OUTPATIENT
Start: 2019-08-23 | End: 2019-08-25 | Stop reason: HOSPADM

## 2019-08-23 RX ADMIN — SODIUM BICARBONATE 650 MG TABLET 1300 MG: at 08:08

## 2019-08-23 RX ADMIN — MYCOPHENOLATE MOFETIL 750 MG: 250 CAPSULE ORAL at 09:08

## 2019-08-23 RX ADMIN — TACROLIMUS 2 MG: 1 CAPSULE ORAL at 05:08

## 2019-08-23 RX ADMIN — TACROLIMUS 3 MG: 1 CAPSULE ORAL at 08:08

## 2019-08-23 RX ADMIN — Medication 400 MG: at 08:08

## 2019-08-23 RX ADMIN — CALCITRIOL CAPSULES 0.25 MCG 0.25 MCG: 0.25 CAPSULE ORAL at 08:08

## 2019-08-23 RX ADMIN — SODIUM BICARBONATE 650 MG TABLET 1300 MG: at 09:08

## 2019-08-23 RX ADMIN — METOPROLOL TARTRATE 12.5 MG: 25 TABLET, FILM COATED ORAL at 08:08

## 2019-08-23 RX ADMIN — CEFTRIAXONE SODIUM 1 G: 1 INJECTION, POWDER, FOR SOLUTION INTRAMUSCULAR; INTRAVENOUS at 12:08

## 2019-08-23 RX ADMIN — LEVOTHYROXINE SODIUM 100 MCG: 50 TABLET ORAL at 05:08

## 2019-08-23 RX ADMIN — MYCOPHENOLATE MOFETIL 750 MG: 250 CAPSULE ORAL at 08:08

## 2019-08-23 RX ADMIN — KETOCONAZOLE 100 MG: 200 TABLET ORAL at 08:08

## 2019-08-23 RX ADMIN — ASPIRIN 81 MG: 81 TABLET, COATED ORAL at 08:08

## 2019-08-23 RX ADMIN — POTASSIUM & SODIUM PHOSPHATES POWDER PACK 280-160-250 MG 2 PACKET: 280-160-250 PACK at 05:08

## 2019-08-23 RX ADMIN — FAMOTIDINE 20 MG: 20 TABLET, FILM COATED ORAL at 09:08

## 2019-08-23 RX ADMIN — PREDNISONE 5 MG: 5 TABLET ORAL at 08:08

## 2019-08-23 RX ADMIN — METOPROLOL TARTRATE 12.5 MG: 25 TABLET, FILM COATED ORAL at 09:08

## 2019-08-23 NOTE — ASSESSMENT & PLAN NOTE
- Repeat 2D ECHO with doppler : Classic  Hypertrophic subaortic  Stenosis with FRANCISCO and outflow tract obstruction and mitral reguritation ( late).  Septum slightly more thickened than post wall   · Concentric left ventricular hypertrophy.  · Normal left ventricular systolic function. The estimated ejection fraction is 65%  · Grade II (moderate) left ventricular diastolic dysfunction consistent with pseudonormalization.  · Elevated left atrial pressure.  · Moderate outflow tract left ventricular obstruction is present.  · No wall motion abnormalities.  · Normal right ventricular systolic function.  · Mild mitral regurgitation.  · Mild to moderate tricuspid regurgitation.  · Mild pulmonary hypertension present.  · Normal central venous pressure (3 mm Hg).  · The estimated PA systolic pressure is 41 mm Hg     - ep following can f/u with salvatore Gamez hydrated

## 2019-08-23 NOTE — ASSESSMENT & PLAN NOTE
- subtherapeutic, might not gotten at OSH  - KTM following no change in regimen   - trending up goal 4-5

## 2019-08-23 NOTE — PLAN OF CARE
Problem: Adult Inpatient Plan of Care  Goal: Plan of Care Review  Outcome: Ongoing (interventions implemented as appropriate)  Plan of care discussed with patient.  Patient ambulating independently, fall precautions in place. Patient has no complaints of pain. Discussed medications and care. Groin sites are CDI. UA resulted positive for a UTI, Ceftriaxone IVP q24h started, KTM managing. Patient has no questions at this time. Will continue to monitor.

## 2019-08-23 NOTE — CONSULTS
[facility] [address]  Kidney Transplant  Consult Note    IP Consult to Kidney/Pancreas Transplant Medicine  Consult performed by: Sagar Gallegos MD  Consult ordered by: Lake Damico MD  Reason for consult: Kidney allograft reassessment and IS management            Subjective:     History of Present Illness:   Mr. Burkett is a 68 y.o. 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 Mr. Burkett had  ESRD HD due to HTN  and congenital absent left kidney who was on dialysis since 6/16/10 until receiving PHS increased risk DDRT (pumped kidney, Campath induction, KDPI 36%, WIT 30 minutes, CIT 14 hours and 14 minutes, donor RPR positive thus patient completed PCN x3, CMV D+/R+) on 16. His baseline creatinine is 1.4-1.6 mg/dL. He takes mycophenolate mofetil, prednisone and tacrolimus  For Immunosuppression.    Post Transplant Course: As per Dr Mcclain  - Pt had DIVINE with suspected rejection on 17.  Kid bx  revealed AVR  Had thymo x 5-7 doses. DSA neg.  -  - He was rebiopsied on 17 which showed acute tubular injury and vacuolization, with + myoglobulin and negative heme.  - Recurrent UTI  Mr Alin Burkett is admitted to Hospital Medicine secondary to an ablation that he will have by Dr Mcmanus.his Cr is 1.5 mg/dL. He is having his ablation today. Renal function is stable. Encourage hydration. KTM cx for Kidney allograft assessment and IS management.    Mr. Burkett is a 69 y.o. year old male who is status post Kidney Transplant - 2016  (#1).    He received induction therapy with Campath and his maintenance immunosuppression consists of:   Immunosuppressants (From admission, onward)    Start     Stop Route Frequency Ordered    19 1800  tacrolimus capsule 2 mg      -- Oral Daily 19 0009    19 0900  mycophenolate capsule 750 mg      -- Oral 2 times daily 19 2313    19 0009  tacrolimus capsule 3  mg      -- Oral Daily 19 0009          Interval History:  He tolerated ablation well will plan to restart Tacro tonight     Past Medical and Surgical History: Mr. 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), Cancer, Celiac artery dissection, Chronic diarrhea, Chronic urethral stricture, Congenital absence of kidney, -donor kidney transplant 16, Dissecting aortic aneurysm (any part), abdominal, Diverticulosis, Encounter for blood transfusion, ESRD (end stage renal disease) (2010), H/O urethral stricture (2018), H/O: urethral stricture, History of AAA (abdominal aortic aneurysm) repair, History of urethral stricture (2018), Hypertension, Hypokalemia, Hypothyroidism (1/10/2014), Inguinal hernia bilateral, non-recurrent, Kidney stones, Organ transplant candidate (2013), Plantar warts (1/10/2014), Recurrent UTI (2017), S/P kidney transplant, Secondary hyperparathyroidism, renal, and Thyroid disease.  He has a past surgical history that includes Gastrojejunostomy; Hernia repair; Hemorrhoid surgery; Aorta - superior mesenteric artery bypass graft; Percutaneous nephrolithotripsy; right ESWL (12); right  ESWL (10/31/12); Bladder neck reconstruction; Abdominal surgery; Bladder surgery; Cystoscopy; Lithotripsy; Kidney transplant; Colonoscopy (10/10/2013); Cystoscopy (N/A, 2018); Cystourethroscopy with direct vision internal urethrotomy (N/A, 2018); Dilation of urethra (N/A, 2018); and Left heart catheterization (Left, 2019).    Past Social and Family History: Mr. Burkett reports that he quit smoking about 9 years ago. His smoking use included cigarettes. He quit after 40.00 years of use. He has never used  "smokeless tobacco. He reports that he does not drink alcohol or use drugs.His family history includes Alcohol abuse in his father; Alzheimer's disease in his mother; Cancer in his brother; Diabetes in his mother; HIV in his brother; Kidney disease in his cousin and paternal uncle; No Known Problems in his brother, brother, daughter, sister, and sister; Stroke in his maternal aunt.    Intake/Output - Last 3 Shifts       08/20 0700 - 08/21 0659 08/21 0700 - 08/22 0659 08/22 0700 - 08/23 0659    I.V. (mL/kg)   800 (11.9)    Total Intake(mL/kg)   800 (11.9)    Urine (mL/kg/hr)   625 (0.7)    Total Output   625    Net   +175                  Review of Systems  Objective:     Vital Signs (Most Recent):  Temp: 96.4 °F (35.8 °C) (08/22/19 2107)  Pulse: 67 (08/22/19 1906)  Resp: 18 (08/22/19 1845)  BP: (!) 143/95 (08/22/19 2000)  SpO2: 99 % (08/22/19 1845) Vital Signs (24h Range):  Temp:  [96.4 °F (35.8 °C)-98.7 °F (37.1 °C)] 96.4 °F (35.8 °C)  Pulse:  [56-82] 67  Resp:  [14-20] 18  SpO2:  [97 %-100 %] 99 %  BP: (119-168)/() 143/95     Weight: 67.2 kg (148 lb 2.4 oz)  Height: 5' 11" (180.3 cm)  Body mass index is 20.66 kg/m².    Physical Exam    Significant Labs:  CBC:   Recent Labs   Lab 08/19/19 2305 08/21/19  0425   WBC 8.89 7.22   RBC 4.31* 4.02*   HGB 12.8* 12.1*   HCT 40.8 37.8*    150   MCV 95 94   MCH 29.7 30.1   MCHC 31.4* 32.0     BMP:   Recent Labs   Lab 08/19/19  2305 08/21/19  2336 08/22/19  0446    84  84 77    139  139 139   K 3.3* 3.5  3.5 3.4*    109  109 109   CO2 26 20*  20* 20*   BUN 20 23  23 22   CREATININE 1.8* 1.5*  1.5* 1.5*   CALCIUM 10.4 9.0  9.0 9.0     CMP:   Recent Labs   Lab 08/19/19  2305 08/21/19  2336 08/22/19  0446    84  84 77   CALCIUM 10.4 9.0  9.0 9.0   ALBUMIN 3.3* 2.9*  2.9* 3.0*   PROT 6.8 6.4  6.4 6.5    139  139 139   K 3.3* 3.5  3.5 3.4*   CO2 26 20*  20* 20*    109  109 109   BUN 20 23  23 22   CREATININE 1.8* " 1.5*  1.5* 1.5*   ALKPHOS 55 53*  53* 57   ALT 22 19  19 18   AST 30 35  35 34     Coagulation: No results for input(s): PT, APTT in the last 168 hours.  Cardiac Markers: No results for input(s): CKMB, TROPONINT, MYOGLOBIN in the last 168 hours.  Labs within the past 24 hours have been reviewed.    Diagnostics:  None    Assessment/Plan:     * Essential hypertension  · Acceptable BP, will continue to monitor. Goal for BP is <130 mmHg SBP and BDP <80 mmHg.  · As per Cardiology         Stage 3 chronic kidney disease  CKD stage G3a/A1 stable sCr at baseline 1.5 mg/dL    · Has hx of recurrent UTI low threshold for UA w/ reflex UCx  · Acid-Base: increase home dose of sodium Bicarb to 1300 mg BID  · Electrolytes: please get RFP and Mag daily. Stable electrolytes  · Maintain MAP > 65  · Hg >7 if Hg < 7 please transfuse.   · continue to monitor strict I/O's, daily weights, renally dose medications, and avoid nephrotoxic agents        -donor kidney transplant  Induced w Campath 30 mg IV intraoperatively & SoluMedrol 875 mg total over 3 days.   Renal allograft biopsy 17 (DIVINE): 21 glomeruli, none globally sclerosed, <5% interstitial fibrosis, no ACR, c4d negative, AVR CCT Type 2 (V1 lesion); plan THYMO   Renal allograft biopsy 17 (follow-up after rejection treatment): 27 glomeruli, no AVR, no ACR, C4d negative, no microcirculation changes, 5-10% interstitial fibrosis; ?cast --> further studies pending      Long-term use of immunosuppressant medication  - induction with Campath  and solumderol  - Keto 100 mg daily  - continue Prograf 3/2 mg, will need to monitor for toxicities  - Tac Level daily, goal [id number] trough level 4-6  - continue Cellcept 750 mg BID  - continue prednisone 5 mg daily            Discharge Planning:        Sagar Gallegos MD  Kidney Transplant  [facility]

## 2019-08-23 NOTE — HPI
Patient of concern is a 69M essential HTN, acquired hypothyroidism, AAA s/p repair, hx kidney transplant in 2016 for ESRD due to HTN and congenital absent left kidney (on prograf, cellcept, and prednisone), now with CKD stage 3 c/b anemia of chronic kidney disease here for evaluation of supraventricular tachycardia. The patient presented originally to [facility] with palpitations and burning chest discomfort and was later transferred to [facility] for [facility] to evaluate an elevated troponin. Dr. Simons states that the patient has had palpitations on and off for a year with recurrent syncopal episodes, most recently increased in frequency after his metoprolol was discontinued due to hypotension. The chest discomfort prompting this presentation started after a meal, and he originally attributed them to reflux, taking an antacid with partial relief of the chest pain. Initial workup significant for SVT, for which he was given adenosine at [facility] with resolution, followed later by resuming his metoprolol tartrate 12.5 mg BID. Workup at the time significant for elevated troponin at 1.162, for which he was transferred to [facility] for a left heart catheterization.      His troponin has continued to rise, most recently elevated at 6.9. Most recent ECG showing 1st degree AV block with PACs and LVH. He underwent TTE on 8/20 that showed concentric LVH, LVEF 65%, grade II diastolic dysfunction, and idiopathic hypertrophic subaortic stenosis with FRANCISCO and outflow obstruction (full report copied below). [facility] official report is pending, but per Dr. Simons had essentially normal coronary arteries. He is now stable on beta blockade with no recurrent chest pain or palpitations. Dr. Simons and Dr. Mcmanus discussed the case, who agreed to consult for ablation at [facility].       Upon arrival to [facility] patient is doing well. Seen at bedside with significant other. He offers no complaints and is joking around.     TTE  performed 8/20:  · Concentric left ventricular hypertrophy.  · Normal left ventricular systolic function. The estimated ejection fraction is 65%  · Grade II (moderate) LVDD consistent with pseudonormalization.  · Elevated left atrial pressure.  · Moderate outflow tract left ventricular obstruction is present.  · No wall motion abnormalities.  · Normal right ventricular systolic function.  · Mild mitral regurgitation.  · Mild to moderate tricuspid regurgitation.  · Mild pulmonary hypertension present.  · Normal central venous pressure (3 mm Hg).  · The estimated PA systolic pressure is 41 mm Hg

## 2019-08-23 NOTE — SUBJECTIVE & OBJECTIVE
Subjective:     History of Present Illness:   Mr. Burkett is a 68 y.o. 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 Mr. Burkett had  ESRD HD due to HTN  and congenital absent left kidney who was on dialysis since 6/16/10 until receiving [facility] increased risk DDRT (pumped kidney, Campath induction, KDPI 36%, WIT 30 minutes, CIT 14 hours and 14 minutes, donor RPR positive thus patient completed PCN x3, CMV D+/R+) on 16. His baseline creatinine is 1.4-1.6 mg/dL. He takes mycophenolate mofetil, prednisone and tacrolimus  For Immunosuppression.    Post Transplant Course: As per Dr Mcclain  - Pt had DIVINE with suspected rejection on 17.  Kid bx  revealed AVR  Had thymo x 5-7 doses. DSA neg.  -  - He was rebiopsied on 17 which showed acute tubular injury and vacuolization, with + myoglobulin and negative heme.  - Recurrent UTI  Mr Alin Burkett is admitted to Hospital Medicine secondary to an ablation that he will have by Dr Mcmanus.his Cr is 1.5 mg/dL. He is having his ablation today. Renal function is stable. Encourage hydration. [facility] cx for Kidney allograft assessment and IS management.    Mr. Burkett is a 69 y.o. year old male who is status post Kidney Transplant - 2016  (#1).    He received induction therapy with Campath and his maintenance immunosuppression consists of:   Immunosuppressants (From admission, onward)    Start     Stop Route Frequency Ordered    19 1800  tacrolimus capsule 2 mg      -- Oral Daily 19 0009    19 0900  mycophenolate capsule 750 mg      -- Oral 2 times daily 19 2313    19 0009  tacrolimus capsule 3 mg      -- Oral Daily 19 0009          Interval History:  He tolerated ablation well will plan to restart Tacro tonight     Past Medical and Surgical History: Mr. 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), Cancer, Celiac artery dissection, Chronic diarrhea, Chronic urethral stricture, Congenital absence of kidney, -donor kidney transplant 16, Dissecting aortic aneurysm (any part), abdominal, Diverticulosis, Encounter for blood transfusion, ESRD (end stage renal disease) (2010), H/O urethral stricture (2018), H/O: urethral stricture, History of AAA (abdominal aortic aneurysm) repair, History of urethral stricture (2018), Hypertension, Hypokalemia, Hypothyroidism (1/10/2014), Inguinal hernia bilateral, non-recurrent, Kidney stones, Organ transplant candidate (2013), Plantar warts (1/10/2014), Recurrent UTI (2017), S/P kidney transplant, Secondary hyperparathyroidism, renal, and Thyroid disease.  He has a past surgical history that includes Gastrojejunostomy; Hernia repair; Hemorrhoid surgery; Aorta - superior mesenteric artery bypass graft; Percutaneous nephrolithotripsy; right ESWL (12); right  ESWL (10/31/12); Bladder neck reconstruction; Abdominal surgery; Bladder surgery; Cystoscopy; Lithotripsy; Kidney transplant; Colonoscopy (10/10/2013); Cystoscopy (N/A, 2018); Cystourethroscopy with direct vision internal urethrotomy (N/A, 2018); Dilation of urethra (N/A, 2018); and Left heart catheterization (Left, 2019).    Past Social and Family History: Mr. Burkett reports that he quit smoking about 9 years ago. His smoking use included cigarettes. He quit after 40.00 years of use. He has never used smokeless tobacco. He reports that he does not drink alcohol or use drugs.His family history includes Alcohol abuse in his father; Alzheimer's disease in his mother; Cancer in his brother; Diabetes in his mother; HIV in his brother; Kidney disease in his cousin and paternal uncle;  "No Known Problems in his brother, brother, daughter, sister, and sister; Stroke in his maternal aunt.    Intake/Output - Last 3 Shifts       08/20 0700 - 08/21 0659 08/21 0700 - 08/22 0659 08/22 0700 - 08/23 0659    I.V. (mL/kg)   800 (11.9)    Total Intake(mL/kg)   800 (11.9)    Urine (mL/kg/hr)   625 (0.7)    Total Output   625    Net   +175                  Review of Systems  Objective:     Vital Signs (Most Recent):  Temp: 96.4 °F (35.8 °C) (08/22/19 2107)  Pulse: 67 (08/22/19 1906)  Resp: 18 (08/22/19 1845)  BP: (!) 143/95 (08/22/19 2000)  SpO2: 99 % (08/22/19 1845) Vital Signs (24h Range):  Temp:  [96.4 °F (35.8 °C)-98.7 °F (37.1 °C)] 96.4 °F (35.8 °C)  Pulse:  [56-82] 67  Resp:  [14-20] 18  SpO2:  [97 %-100 %] 99 %  BP: (119-168)/() 143/95     Weight: 67.2 kg (148 lb 2.4 oz)  Height: 5' 11" (180.3 cm)  Body mass index is 20.66 kg/m².    Physical Exam    Significant Labs:  CBC:   Recent Labs   Lab 08/19/19 2305 08/21/19  0425   WBC 8.89 7.22   RBC 4.31* 4.02*   HGB 12.8* 12.1*   HCT 40.8 37.8*    150   MCV 95 94   MCH 29.7 30.1   MCHC 31.4* 32.0     BMP:   Recent Labs   Lab 08/19/19  2305 08/21/19  2336 08/22/19  0446    84  84 77    139  139 139   K 3.3* 3.5  3.5 3.4*    109  109 109   CO2 26 20*  20* 20*   BUN 20 23  23 22   CREATININE 1.8* 1.5*  1.5* 1.5*   CALCIUM 10.4 9.0  9.0 9.0     CMP:   Recent Labs   Lab 08/19/19  2305 08/21/19  2336 08/22/19  0446    84  84 77   CALCIUM 10.4 9.0  9.0 9.0   ALBUMIN 3.3* 2.9*  2.9* 3.0*   PROT 6.8 6.4  6.4 6.5    139  139 139   K 3.3* 3.5  3.5 3.4*   CO2 26 20*  20* 20*    109  109 109   BUN 20 23  23 22   CREATININE 1.8* 1.5*  1.5* 1.5*   ALKPHOS 55 53*  53* 57   ALT 22 19  19 18   AST 30 35  35 34     Coagulation: No results for input(s): PT, APTT in the last 168 hours.  Cardiac Markers: No results for input(s): CKMB, TROPONINT, MYOGLOBIN in the last 168 hours.  Labs within the past 24 hours " have been reviewed.    Diagnostics:  None

## 2019-08-23 NOTE — NURSING TRANSFER
Nursing Transfer Note      8/22/2019     Transfer To: [id number]    Transfer via stretcher    Transfer with cardiac monitoring    Transported by tobin Wright    Medicines sent: no    Chart send with patient: Yes    Notified: spouse

## 2019-08-23 NOTE — ASSESSMENT & PLAN NOTE
· Acceptable BP, will continue to monitor. Goal for BP is <130 mmHg SBP and BDP <80 mmHg.  · As per Cardiology

## 2019-08-23 NOTE — SUBJECTIVE & OBJECTIVE
Interval History: Patient upset he's being interrupted for so many interventions, blood draws and vital signs at night.  Explained that is procedural here in order to monitor post procedure.  He denies cp, palp, urinary symptoms.     Review of Systems   Constitutional: Negative for activity change, appetite change, chills, fatigue and fever.   HENT: Negative for congestion, sore throat and trouble swallowing.    Eyes: Negative for redness and visual disturbance.   Respiratory: Negative for cough, shortness of breath and wheezing.    Cardiovascular: Negative for chest pain, palpitations and leg swelling.   Gastrointestinal: Negative for abdominal pain, constipation, diarrhea, nausea and vomiting.   Endocrine: Negative for cold intolerance and heat intolerance.   Genitourinary: Negative for decreased urine volume, difficulty urinating and hematuria.   Musculoskeletal: Positive for back pain. Negative for myalgias.   Skin: Negative for color change, rash and wound.   Allergic/Immunologic: Negative for immunocompromised state.   Neurological: Negative for dizziness, weakness, light-headedness, numbness and headaches.   Hematological: Does not bruise/bleed easily.   Psychiatric/Behavioral: Negative for agitation, decreased concentration and sleep disturbance. The patient is not nervous/anxious.      Objective:     Vital Signs (Most Recent):  Temp: 98.8 °F (37.1 °C) (08/23/19 1557)  Pulse: 70 (08/23/19 1557)  Resp: 18 (08/23/19 1557)  BP: 123/80 (08/23/19 1557)  SpO2: 96 % (08/23/19 1557) Vital Signs (24h Range):  Temp:  [96.4 °F (35.8 °C)-99.4 °F (37.4 °C)] 98.8 °F (37.1 °C)  Pulse:  [62-77] 70  Resp:  [14-20] 18  SpO2:  [95 %-100 %] 96 %  BP: (115-168)/() 123/80     Weight: 67.2 kg (148 lb 2.4 oz)  Body mass index is 20.66 kg/m².    Intake/Output Summary (Last 24 hours) at 8/23/2019 1620  Last data filed at 8/23/2019 0200  Gross per 24 hour   Intake 200 ml   Output 600 ml   Net -400 ml      Physical Exam    Constitutional: He is oriented to person, place, and time. He appears well-developed and well-nourished. No distress.   HENT:   Head: Normocephalic and atraumatic.   Right Ear: External ear normal.   Left Ear: External ear normal.   Nose: Nose normal.   Eyes: Conjunctivae and EOM are normal.   Neck: Normal range of motion. Neck supple. No JVD present.   Cardiovascular: Normal rate, regular rhythm and intact distal pulses.   Murmur heard.  Pulmonary/Chest: Effort normal and breath sounds normal. No respiratory distress. He has no wheezes. He has no rales.   Abdominal: Soft. Bowel sounds are normal. He exhibits no distension and no mass. There is no tenderness. There is no guarding.   Musculoskeletal: Normal range of motion. He exhibits no edema.   Neurological: He is alert and oriented to person, place, and time. No cranial nerve deficit or sensory deficit. Coordination normal.   Skin: Skin is warm and dry. No rash noted. He is not diaphoretic. No erythema.   Bilateral groin dressings intact, no s/s hematoma or bleeding     Psychiatric: He has a normal mood and affect. His behavior is normal. Judgment and thought content normal.   Nursing note and vitals reviewed.      Significant Labs:   BMP:   Recent Labs   Lab 08/23/19  0808   GLU 78      K 4.1      CO2 21*   BUN 16   CREATININE 1.4   CALCIUM 9.5   MG 2.1     Urine Culture: No results for input(s): LABURIN in the last 48 hours.  Urine Studies:   Recent Labs   Lab 08/23/19  0332   COLORU Yellow   APPEARANCEUA Cloudy*   PHUR 6.0   SPECGRAV 1.010   PROTEINUA Negative   GLUCUA Negative   KETONESU Negative   BILIRUBINUA Negative   OCCULTUA 1+*   NITRITE Positive*   LEUKOCYTESUR 3+*   RBCUA 2   WBCUA >100*   BACTERIA Moderate*   SQUAMEPITHEL 0       Significant Imaging: I have reviewed all pertinent imaging results/findings within the past 24 hours.

## 2019-08-23 NOTE — HOSPITAL COURSE
Admitted to hospital medicine for SVT ablation and was found to have a significant UTI (UA +, Cx >100k GNR, recurrent klebsiella), diarrhea (+ c diff), subtherapeutic immunosuppression tacrolimus level (? Proper dosing OSH).      EP performed a successful ablation on 8/22.  A urinalysis was sent and was found to be grossly + nitrate, LE, > 100k WBC and Many bacteria.  He had a Klebsiella UTI in January which was pansensitive. 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 Urethrotome used to open strictured area. He also has a large mouth bladder diverticula with multiple outpouchings. He is being sent home on levofloxacin (adjusted for CRcl of 47 so 750 qod and already having had 3 days of CTX prior to discharge)  Dr. Ryan and Dr. Rayray Moya from [facility] consulted for subtherapeutic immunosuppression, acute UTI and now C diff diarrhea. Patient started on 10 day oral course of oral vancomycin, discussed and approved by transplant medicine.  Cell cept on hold while taking antibiotics per [facility].     Dispo: home today with f/u with Dr. Simons his referring cardiologist and Dr. Mann urologist for recurrent UTI's

## 2019-08-23 NOTE — ASSESSMENT & PLAN NOTE
Continue regimen of prograf, cellcept, and prednisone  - Daily prograf level ordered  - Consider renal transplant sonogram as indicated in consultation with [facility]  - Continue sodium bicarbonate  - C/w calcitriol

## 2019-08-23 NOTE — ASSESSMENT & PLAN NOTE
- Adenosine responsive  - EP consulted s/p successful ablation of slow pathway modification, VA dissociation in setting of structural heart disease.  - C/w BB  - Add ASA  Discharge instructions:  1. Keep the dressing on, clean, and dry for 24 hours.   2. Please do not lift anything more than 5 lbs for a week  3. After 24 hours, the dressing may be removed and a shower is allowed.   4. Clean the area with mild soap and water and then leave it opened to air.   5. Avoid scrubbing the groin for the next 2 days, do not spray water directly on the site, for the next week, please dab the area lightly to minimize risk of infection  6. Once the skin has healed, bathing in a tub or swimming is allowed.   7. Please do not lift anything more   8. Inspect the groin site daily and report to the physician any bleeding and/or swelling at the site that cannot be controlled with manual pressure for 10 minutes, unusual pain at the access site or affected extremity, unusual swelling at the access site, or signs or symptoms of infection such as redness, pain, or fever.   11. Please follow up with Dr. Mcgowan (referring physician) next week     Call 911 if you have:   -Bleeding from the puncture site that you cannot stop by doing the following:      Relax and lie down right away. Keep your leg flat and apply firm pressure to the site using your fingers and a gauze pad. Keep the pressure on for 20 minutes. Continue this until the bleeding stops. This may take awhile. When bleeding stops, cover the site with a sterile bandage and keep your leg still as much as possible.

## 2019-08-23 NOTE — PLAN OF CARE
08/23/2019  7:37 AM    Patient had no arrythmias overnight, successful ablation. Recovered well with soft groins. No medication changes.    Can be discharged from EP standpoint with the following instructions:    1. Keep the dressing on, clean, and dry for 24 hours.   2. Please do not lift anything more than 5 lbs for a week  3. After 24 hours, the dressing may be removed and a shower is allowed.   4. Clean the area with mild soap and water and then leave it opened to air.   5. Avoid scrubbing the groin for the next 2 days, do not spray water directly on the site, for the next week, please dab the area lightly to minimize risk of infection  6. Once the skin has healed, bathing in a tub or swimming is allowed.   7. Please do not lift anything more   8. Inspect the groin site daily and report to the physician any bleeding and/or swelling at the site that cannot be controlled with manual pressure for 10 minutes, unusual pain at the access site or affected extremity, unusual swelling at the access site, or signs or symptoms of infection such as redness, pain, or fever.   11. Please follow up with Dr. Mcgowan (referring physician) next week    Call 911 if you have:   -Bleeding from the puncture site that you cannot stop by doing the following:     Relax and lie down right away. Keep your leg flat and apply firm pressure to the site using your fingers and a gauze pad. Keep the pressure on for 20 minutes. Continue this until the bleeding stops. This may take awhile. When bleeding stops, cover the site with a sterile bandage and keep your leg still as much as possible.    Nicola Garcia

## 2019-08-23 NOTE — ASSESSMENT & PLAN NOTE
CKD stage G3a/A1 stable sCr at baseline 1.5 mg/dL    · Has hx of recurrent UTI low threshold for UA w/ reflex UCx  · Acid-Base: increase home dose of sodium Bicarb to 1300 mg BID  · Electrolytes: please get RFP and Mag daily. Stable electrolytes  · Maintain MAP > 65  · Hg >7 if Hg < 7 please transfuse.   · continue to monitor strict I/O's, daily weights, renally dose medications, and avoid nephrotoxic agents

## 2019-08-23 NOTE — PROGRESS NOTES
[facility] Hospital Medicine  Progress Note    Patient Name: Alin Burkett  MRN: [id number]  Patient Class: IP- Inpatient   Admission Date: 8/21/2019  Length of Stay: 2 days  Attending Physician: Barbie Garner MD  Primary Care Provider: Carmen Krueger MD    [facility] Medicine Team: [facility] MED MAUREEN Gruber NP    Subjective:     Principal Problem:SVT (supraventricular tachycardia)        HPI:  Patient of concern is a 69M essential HTN, acquired hypothyroidism, AAA s/p repair, hx kidney transplant in 2016 for ESRD due to HTN and congenital absent left kidney (on prograf, cellcept, and prednisone), now with CKD stage 3 c/b anemia of chronic kidney disease here for evaluation of supraventricular tachycardia. The patient presented originally to [facility] with palpitations and burning chest discomfort and was later transferred to [facility] for C to evaluate an elevated troponin. Dr. Simons states that the patient has had palpitations on and off for a year with recurrent syncopal episodes, most recently increased in frequency after his metoprolol was discontinued due to hypotension. The chest discomfort prompting this presentation started after a meal, and he originally attributed them to reflux, taking an antacid with partial relief of the chest pain. Initial workup significant for SVT, for which he was given adenosine at [facility] with resolution, followed later by resuming his metoprolol tartrate 12.5 mg BID. Workup at the time significant for elevated troponin at 1.162, for which he was transferred to [facility] for a left heart catheterization.      His troponin has continued to rise, most recently elevated at 6.9. Most recent ECG showing 1st degree AV block with PACs and LVH. He underwent TTE on 8/20 that showed concentric LVH, LVEF 65%, grade II diastolic dysfunction, and idiopathic hypertrophic subaortic stenosis with FRANCISCO and outflow obstruction (full report  copied below). [facility] official report is pending, but per Dr. Simons had essentially normal coronary arteries. He is now stable on beta blockade with no recurrent chest pain or palpitations. Dr. Simons and Dr. Mcmanus discussed the case, who agreed to consult for ablation at [facility].       Upon arrival to [facility], patient is doing well. Seen at bedside with significant other. He offers no complaints and is joking around.     TTE performed 8/20:  · Concentric left ventricular hypertrophy.  · Normal left ventricular systolic function. The estimated ejection fraction is 65%  · Grade II (moderate) LVDD consistent with pseudonormalization.  · Elevated left atrial pressure.  · Moderate outflow tract left ventricular obstruction is present.  · No wall motion abnormalities.  · Normal right ventricular systolic function.  · Mild mitral regurgitation.  · Mild to moderate tricuspid regurgitation.  · Mild pulmonary hypertension present.  · Normal central venous pressure (3 mm Hg).  The estimated PA systolic pressure is 41 mm Hg    Overview/Hospital Course:  Admitted to hospital medicine for SVT ablation and was found to have a significant UTI (UA +, Cx pending), subtherapeutic immunosuppression tacrolimus level (? Proper dosing OSH).  EP performed a successful ablation on 8/22.  A urinalysis was sent and was found to be grossly + nitrate, LE, > 100k WBC and Many bacteria.  He had a Klebsiella UTI in January which was pansensitive.  Dr. Ryan from [facility] consulted for subtherapeutic immunosuppression and acute UTI.  Patient was started on CTX pending formal urine cx findings.     Dispo: pending urine cx and final treatment regimen, from a cardiac standpoint he's free to go home and f/u with Dr. Simons his referring cardiologist.     Interval History: Patient upset he's being interrupted for so many interventions, blood draws and vital signs at night.  Explained that is procedural here in order to monitor post procedure.  He denies cp,  palp, urinary symptoms.     Review of Systems   Constitutional: Negative for activity change, appetite change, chills, fatigue and fever.   HENT: Negative for congestion, sore throat and trouble swallowing.    Eyes: Negative for redness and visual disturbance.   Respiratory: Negative for cough, shortness of breath and wheezing.    Cardiovascular: Negative for chest pain, palpitations and leg swelling.   Gastrointestinal: Negative for abdominal pain, constipation, diarrhea, nausea and vomiting.   Endocrine: Negative for cold intolerance and heat intolerance.   Genitourinary: Negative for decreased urine volume, difficulty urinating and hematuria.   Musculoskeletal: Positive for back pain. Negative for myalgias.   Skin: Negative for color change, rash and wound.   Allergic/Immunologic: Negative for immunocompromised state.   Neurological: Negative for dizziness, weakness, light-headedness, numbness and headaches.   Hematological: Does not bruise/bleed easily.   Psychiatric/Behavioral: Negative for agitation, decreased concentration and sleep disturbance. The patient is not nervous/anxious.      Objective:     Vital Signs (Most Recent):  Temp: 98.8 °F (37.1 °C) (08/23/19 1557)  Pulse: 70 (08/23/19 1557)  Resp: 18 (08/23/19 1557)  BP: 123/80 (08/23/19 1557)  SpO2: 96 % (08/23/19 1557) Vital Signs (24h Range):  Temp:  [96.4 °F (35.8 °C)-99.4 °F (37.4 °C)] 98.8 °F (37.1 °C)  Pulse:  [62-77] 70  Resp:  [14-20] 18  SpO2:  [95 %-100 %] 96 %  BP: (115-168)/() 123/80     Weight: 67.2 kg (148 lb 2.4 oz)  Body mass index is 20.66 kg/m².    Intake/Output Summary (Last 24 hours) at 8/23/2019 1620  Last data filed at 8/23/2019 0200  Gross per 24 hour   Intake 200 ml   Output 600 ml   Net -400 ml      Physical Exam   Constitutional: He is oriented to person, place, and time. He appears well-developed and well-nourished. No distress.   HENT:   Head: Normocephalic and atraumatic.   Right Ear: External ear normal.   Left Ear:  External ear normal.   Nose: Nose normal.   Eyes: Conjunctivae and EOM are normal.   Neck: Normal range of motion. Neck supple. No JVD present.   Cardiovascular: Normal rate, regular rhythm and intact distal pulses.   Murmur heard.  Pulmonary/Chest: Effort normal and breath sounds normal. No respiratory distress. He has no wheezes. He has no rales.   Abdominal: Soft. Bowel sounds are normal. He exhibits no distension and no mass. There is no tenderness. There is no guarding.   Musculoskeletal: Normal range of motion. He exhibits no edema.   Neurological: He is alert and oriented to person, place, and time. No cranial nerve deficit or sensory deficit. Coordination normal.   Skin: Skin is warm and dry. No rash noted. He is not diaphoretic. No erythema.   Bilateral groin dressings intact, no s/s hematoma or bleeding     Psychiatric: He has a normal mood and affect. His behavior is normal. Judgment and thought content normal.   Nursing note and vitals reviewed.      Significant Labs:   BMP:   Recent Labs   Lab 08/23/19  0808   GLU 78      K 4.1      CO2 21*   BUN 16   CREATININE 1.4   CALCIUM 9.5   MG 2.1     Urine Culture: No results for input(s): LABURIN in the last 48 hours.  Urine Studies:   Recent Labs   Lab 08/23/19  0332   COLORU Yellow   APPEARANCEUA Cloudy*   PHUR 6.0   SPECGRAV 1.010   PROTEINUA Negative   GLUCUA Negative   KETONESU Negative   BILIRUBINUA Negative   OCCULTUA 1+*   NITRITE Positive*   LEUKOCYTESUR 3+*   RBCUA 2   WBCUA >100*   BACTERIA Moderate*   SQUAMEPITHEL 0       Significant Imaging: I have reviewed all pertinent imaging results/findings within the past 24 hours.      Assessment/Plan:      * SVT (supraventricular tachycardia)  - Adenosine responsive  - EP consulted s/p successful ablation of slow pathway modification, VA dissociation in setting of structural heart disease.  - C/w BB  - Add ASA  Discharge instructions:  1. Keep the dressing on, clean, and dry for 24 hours.   2.  Please do not lift anything more than 5 lbs for a week  3. After 24 hours, the dressing may be removed and a shower is allowed.   4. Clean the area with mild soap and water and then leave it opened to air.   5. Avoid scrubbing the groin for the next 2 days, do not spray water directly on the site, for the next week, please dab the area lightly to minimize risk of infection  6. Once the skin has healed, bathing in a tub or swimming is allowed.   7. Please do not lift anything more   8. Inspect the groin site daily and report to the physician any bleeding and/or swelling at the site that cannot be controlled with manual pressure for 10 minutes, unusual pain at the access site or affected extremity, unusual swelling at the access site, or signs or symptoms of infection such as redness, pain, or fever.   11. Please follow up with Dr. Mcgowan (referring physician) next week     Call 911 if you have:   -Bleeding from the puncture site that you cannot stop by doing the following:      Relax and lie down right away. Keep your leg flat and apply firm pressure to the site using your fingers and a gauze pad. Keep the pressure on for 20 minutes. Continue this until the bleeding stops. This may take awhile. When bleeding stops, cover the site with a sterile bandage and keep your leg still as much as possible.       Urinary tract infection without hematuria  - urinalysis grossly positive  - immunosupressed, KTM managing, started on CTX for now h/o Klebsiella earlier this year      Elevated troponin  - d/t poor renal clearance      Stage 3 chronic kidney disease  - see above       -donor kidney transplant   -KTM following       Long-term use of immunosuppressant medication  - subtherapeutic, might not gotten at OSH  - KTM following no change in regimen   - trending up goal 4-5      LVH (left ventricular hypertrophy) due to hypertensive disease  - Repeat 2D ECHO with doppler : Classic  Hypertrophic subaortic  Stenosis with  FRANCISCO and outflow tract obstruction and mitral reguritation ( late).  Septum  slightly more thickened than post wall   · Concentric left ventricular hypertrophy.  · Normal left ventricular systolic function. The estimated ejection fraction is 65%  · Grade II (moderate) left ventricular diastolic dysfunction consistent with pseudonormalization.  · Elevated left atrial pressure.  · Moderate outflow tract left ventricular obstruction is present.  · No wall motion abnormalities.  · Normal right ventricular systolic function.  · Mild mitral regurgitation.  · Mild to moderate tricuspid regurgitation.  · Mild pulmonary hypertension present.  · Normal central venous pressure (3 mm Hg).  · The estimated PA systolic pressure is 41 mm Hg     - ep following can f/u with Dr. Simons, stay hydrated    Hypothyroidism  - TSH appropriate   - Continue home levothyroxine        Secondary hyperparathyroidism, renal   Continue regimen of prograf, cellcept, and prednisone  - Daily prograf level ordered  - Consider renal transplant sonogram as indicated in consultation with KTM  - Continue sodium bicarbonate  - C/w calcitriol      Anemia of chronic disease  - Currently stable        Essential hypertension  - - Currently stable   - Continue to monitor blood pressure while receiving beta-blockade        VTE Risk Mitigation (From admission, onward)        Ordered     IP VTE HIGH RISK PATIENT  Once      08/21/19 2316     Reason for No Pharmacological VTE Prophylaxis  Once      08/21/19 2316                Perri Gruber NP  Department of Hospital Medicine   [facility]

## 2019-08-23 NOTE — ASSESSMENT & PLAN NOTE
Induced w Campath 30 mg IV intraoperatively & SoluMedrol 875 mg total over 3 days.   Renal allograft biopsy 1/6/17 (DIVINE): 21 glomeruli, none globally sclerosed, <5% interstitial fibrosis, no ACR, c4d negative, AVR CCT Type 2 (V1 lesion); plan THYMO   Renal allograft biopsy 1/23/17 (follow-up after rejection treatment): 27 glomeruli, no AVR, no ACR, C4d negative, no microcirculation changes, 5-10% interstitial fibrosis; ?cast --> further studies pending

## 2019-08-23 NOTE — DISCHARGE INSTRUCTIONS
1. Keep the dressing on, clean, and dry for 24 hours.   2. Please do not lift anything more than 5 lbs for a week  3. After 24 hours, the dressing may be removed and a shower is allowed.   4. Clean the area with mild soap and water and then leave it opened to air.   5. Avoid scrubbing the groin for the next 2 days, do not spray water directly on the site, for the next week, please dab the area lightly to minimize risk of infection  6. Once the skin has healed, bathing in a tub or swimming is allowed.   7. Please do not lift anything more   8. Inspect the groin site daily and report to the physician any bleeding and/or swelling at the site that cannot be controlled with manual pressure for 10 minutes, unusual pain at the access site or affected extremity, unusual swelling at the access site, or signs or symptoms of infection such as redness, pain, or fever.   11. Please follow up with Dr. Mcgowan (referring physician) next week     Call 911 if you have:   -Bleeding from the puncture site that you cannot stop by doing the following:      Relax and lie down right away. Keep your leg flat and apply firm pressure to the site using your fingers and a gauze pad. Keep the pressure on for 20 minutes. Continue this until the bleeding stops. This may take awhile. When bleeding stops, cover the site with a sterile bandage and keep your leg still as much as possible.     Nicola Garcia

## 2019-08-23 NOTE — ASSESSMENT & PLAN NOTE
- urinalysis grossly positive  - immunosupressed, KTM managing, started on CTX for now h/o Klebsiella earlier this year

## 2019-08-24 PROBLEM — B96.89 UTI DUE TO KLEBSIELLA SPECIES: Status: ACTIVE | Noted: 2019-08-23

## 2019-08-24 LAB
ALBUMIN SERPL BCP-MCNC: 2.7 G/DL (ref 3.5–5.2)
ALP SERPL-CCNC: 53 U/L (ref 55–135)
ALT SERPL W/O P-5'-P-CCNC: 15 U/L (ref 10–44)
ANION GAP SERPL CALC-SCNC: 8 MMOL/L (ref 8–16)
AST SERPL-CCNC: 19 U/L (ref 10–40)
BILIRUB SERPL-MCNC: 0.3 MG/DL (ref 0.1–1)
BUN SERPL-MCNC: 19 MG/DL (ref 8–23)
C DIFF GDH STL QL: POSITIVE
C DIFF TOX A+B STL QL IA: POSITIVE
CALCIUM SERPL-MCNC: 9.1 MG/DL (ref 8.7–10.5)
CHLORIDE SERPL-SCNC: 110 MMOL/L (ref 95–110)
CO2 SERPL-SCNC: 24 MMOL/L (ref 23–29)
CREAT SERPL-MCNC: 1.6 MG/DL (ref 0.5–1.4)
EST. GFR  (AFRICAN AMERICAN): 50.4 ML/MIN/1.73 M^2
EST. GFR  (NON AFRICAN AMERICAN): 43.6 ML/MIN/1.73 M^2
GLUCOSE SERPL-MCNC: 82 MG/DL (ref 70–110)
MAGNESIUM SERPL-MCNC: 2 MG/DL (ref 1.6–2.6)
PHOSPHATE SERPL-MCNC: 2.2 MG/DL (ref 2.7–4.5)
POTASSIUM SERPL-SCNC: 3.9 MMOL/L (ref 3.5–5.1)
PROT SERPL-MCNC: 6.2 G/DL (ref 6–8.4)
SODIUM SERPL-SCNC: 142 MMOL/L (ref 136–145)
TACROLIMUS BLD-MCNC: 3.7 NG/ML (ref 5–15)

## 2019-08-24 PROCEDURE — 63600175 PHARM REV CODE 636 W HCPCS: Performed by: NURSE PRACTITIONER

## 2019-08-24 PROCEDURE — 63600175 PHARM REV CODE 636 W HCPCS: Performed by: FAMILY MEDICINE

## 2019-08-24 PROCEDURE — 63600175 PHARM REV CODE 636 W HCPCS: Performed by: HOSPITALIST

## 2019-08-24 PROCEDURE — 87449 NOS EACH ORGANISM AG IA: CPT

## 2019-08-24 PROCEDURE — 87046 STOOL CULTR AEROBIC BACT EA: CPT | Mod: 59

## 2019-08-24 PROCEDURE — 83735 ASSAY OF MAGNESIUM: CPT

## 2019-08-24 PROCEDURE — 25000003 PHARM REV CODE 250: Performed by: FAMILY MEDICINE

## 2019-08-24 PROCEDURE — 99233 PR SUBSEQUENT HOSPITAL CARE,LEVL III: ICD-10-PCS | Mod: GC,,, | Performed by: INTERNAL MEDICINE

## 2019-08-24 PROCEDURE — 25000003 PHARM REV CODE 250: Performed by: HOSPITALIST

## 2019-08-24 PROCEDURE — 80053 COMPREHEN METABOLIC PANEL: CPT

## 2019-08-24 PROCEDURE — 84100 ASSAY OF PHOSPHORUS: CPT

## 2019-08-24 PROCEDURE — 87427 SHIGA-LIKE TOXIN AG IA: CPT | Mod: 59

## 2019-08-24 PROCEDURE — 25000003 PHARM REV CODE 250: Performed by: INTERNAL MEDICINE

## 2019-08-24 PROCEDURE — 99232 PR SUBSEQUENT HOSPITAL CARE,LEVL II: ICD-10-PCS | Mod: ,,, | Performed by: NURSE PRACTITIONER

## 2019-08-24 PROCEDURE — 36415 COLL VENOUS BLD VENIPUNCTURE: CPT

## 2019-08-24 PROCEDURE — 99232 SBSQ HOSP IP/OBS MODERATE 35: CPT | Mod: ,,, | Performed by: NURSE PRACTITIONER

## 2019-08-24 PROCEDURE — 80197 ASSAY OF TACROLIMUS: CPT

## 2019-08-24 PROCEDURE — 20600001 HC STEP DOWN PRIVATE ROOM

## 2019-08-24 PROCEDURE — 99233 SBSQ HOSP IP/OBS HIGH 50: CPT | Mod: GC,,, | Performed by: INTERNAL MEDICINE

## 2019-08-24 PROCEDURE — 87209 SMEAR COMPLEX STAIN: CPT

## 2019-08-24 PROCEDURE — 87045 FECES CULTURE AEROBIC BACT: CPT

## 2019-08-24 RX ORDER — SODIUM,POTASSIUM PHOSPHATES 280-250MG
2 POWDER IN PACKET (EA) ORAL ONCE
Status: COMPLETED | OUTPATIENT
Start: 2019-08-24 | End: 2019-08-24

## 2019-08-24 RX ADMIN — CALCITRIOL CAPSULES 0.25 MCG 0.25 MCG: 0.25 CAPSULE ORAL at 08:08

## 2019-08-24 RX ADMIN — KETOCONAZOLE 100 MG: 200 TABLET ORAL at 10:08

## 2019-08-24 RX ADMIN — TACROLIMUS 2 MG: 1 CAPSULE ORAL at 05:08

## 2019-08-24 RX ADMIN — SODIUM BICARBONATE 650 MG TABLET 1300 MG: at 08:08

## 2019-08-24 RX ADMIN — PREDNISONE 5 MG: 5 TABLET ORAL at 08:08

## 2019-08-24 RX ADMIN — METOPROLOL TARTRATE 12.5 MG: 25 TABLET, FILM COATED ORAL at 08:08

## 2019-08-24 RX ADMIN — Medication 400 MG: at 08:08

## 2019-08-24 RX ADMIN — LEVOTHYROXINE SODIUM 100 MCG: 50 TABLET ORAL at 05:08

## 2019-08-24 RX ADMIN — SODIUM BICARBONATE 650 MG TABLET 1300 MG: at 09:08

## 2019-08-24 RX ADMIN — CEFTRIAXONE SODIUM 1 G: 1 INJECTION, POWDER, FOR SOLUTION INTRAMUSCULAR; INTRAVENOUS at 10:08

## 2019-08-24 RX ADMIN — METOPROLOL TARTRATE 12.5 MG: 25 TABLET, FILM COATED ORAL at 09:08

## 2019-08-24 RX ADMIN — TACROLIMUS 3 MG: 1 CAPSULE ORAL at 08:08

## 2019-08-24 RX ADMIN — FAMOTIDINE 20 MG: 20 TABLET, FILM COATED ORAL at 09:08

## 2019-08-24 RX ADMIN — POTASSIUM & SODIUM PHOSPHATES POWDER PACK 280-160-250 MG 2 PACKET: 280-160-250 PACK at 12:08

## 2019-08-24 RX ADMIN — ASPIRIN 81 MG: 81 TABLET, COATED ORAL at 08:08

## 2019-08-24 NOTE — PROGRESS NOTES
[facility]  Kidney Transplant  Progress Note      Reason for Follow-up: Reassessment of Kidney Transplant - 2016  (#1) recipient and management of immunosuppression.    ORGAN: LEFT KIDNEY    Donor Type:  - Brain Death          Subjective:   History of Present Illness:  Mr. Burkett is a 68 y.o. 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 Mr. Burkett had  ESRD HD due to HTN  and congenital absent left kidney who was on dialysis since 6/16/10 until receiving PHS increased risk DDRT (pumped kidney, Campath induction, KDPI 36%, WIT 30 minutes, CIT 14 hours and 14 minutes, donor RPR positive thus patient completed PCN x3, CMV D+/R+) on 16. His baseline creatinine is 1.4-1.6 mg/dL. He takes mycophenolate mofetil, prednisone and tacrolimus  For Immunosuppression.    Post Transplant Course: As per Dr Mcclain  - Pt had DIVINE with suspected rejection on 17.  Kid bx  revealed AVR  Had thymo x 5-7 doses. DSA neg.  -  - He was rebiopsied on 17 which showed acute tubular injury and vacuolization, with + myoglobulin and negative heme.  - Recurrent UTI  Mr Alin Burkett is admitted to Hospital Medicine secondary to an ablation that he will have by Dr Mcmanus.his Cr is 1.5 mg/dL. He is having his ablation today. Renal function is stable. Encourage hydration. KTM cx for Kidney allograft assessment and IS management.    Mr. Burkett is a 69 y.o. year old male who is status post Kidney Transplant - 2016  (#1).    His maintenance immunosuppression consists of:   Immunosuppressants (From admission, onward)    Start     Stop Route Frequency Ordered    19 1800  tacrolimus capsule 2 mg      -- Oral Daily 19 0009    19 0900  mycophenolate capsule 750 mg      -- Oral 2 times daily 19 2313    19 000  tacrolimus capsule 3 mg      -- Oral Daily 19 000        Interval History:  Tolerated well  ablation with a very stable serum creatinine of 1.4.  Lower and was best of his baseline.  Patient making good urine stable.  He missed his doses yesterday Prograf level 3.1 after medication restarted will continue to trend.    Past Medical, Surgical, Family, and Social History:   Unchanged from H&P.    Scheduled Meds:   aspirin  81 mg Oral Daily    calcitRIOL  0.25 mcg Oral Daily    cefTRIAXone (ROCEPHIN) IVPB  1 g Intravenous Q24H    famotidine  20 mg Oral QHS    ketoconazole  100 mg Oral Daily    levothyroxine  100 mcg Oral Daily    magnesium oxide  400 mg Oral Daily    metoprolol tartrate  12.5 mg Oral BID    mycophenolate  750 mg Oral BID    predniSONE  5 mg Oral Daily    sodium bicarbonate  1,300 mg Oral BID    tacrolimus  2 mg Oral Daily    tacrolimus  3 mg Oral Daily     Continuous Infusions:  PRN Meds:acetaminophen, HYDROcodone-acetaminophen, HYDROmorphone, lidocaine HCL 20 mg/ml (2%), ondansetron, ondansetron, polyethylene glycol, promethazine, sodium chloride 0.9%    Intake/Output - Last 3 Shifts       08/21 0700 - 08/22 0659 08/22 0700 - 08/23 0659 08/23 0700 - 08/24 0659    I.V. (mL/kg)  800 (11.9)     Total Intake(mL/kg)  800 (11.9)     Urine (mL/kg/hr)  925 (0.6)     Total Output  925     Net  -125                   Review of Systems   Constitutional: Negative for appetite change, fatigue and fever.   HENT: Negative for facial swelling, hearing loss and tinnitus.    Eyes: Negative for pain and visual disturbance.   Respiratory: Negative for chest tightness and shortness of breath.    Cardiovascular: Negative for chest pain and leg swelling.   Gastrointestinal: Negative for abdominal pain, nausea and vomiting.   Genitourinary: Negative for difficulty urinating, dysuria and flank pain.   Musculoskeletal: Positive for back pain. Negative for arthralgias and myalgias.   Skin: Negative for color change.   Allergic/Immunologic: Positive for immunocompromised state.   Neurological: Negative for  "dizziness, syncope, weakness, light-headedness and headaches.   Psychiatric/Behavioral: Negative for behavioral problems and confusion.      Objective:     Vital Signs (Most Recent):  Temp: 98.8 °F (37.1 °C) (08/23/19 1557)  Pulse: 67 (08/23/19 1931)  Resp: 18 (08/23/19 1557)  BP: 123/80 (08/23/19 1557)  SpO2: 96 % (08/23/19 1557) Vital Signs (24h Range):  Temp:  [96.4 °F (35.8 °C)-99.4 °F (37.4 °C)] 98.8 °F (37.1 °C)  Pulse:  [62-77] 67  Resp:  [18] 18  SpO2:  [95 %-97 %] 96 %  BP: (115-143)/(76-91) 123/80     Weight: 67.2 kg (148 lb 2.4 oz)  Height: 5' 11" (180.3 cm)  Body mass index is 20.66 kg/m².    Physical Exam   Constitutional: He is oriented to person, place, and time. He appears well-developed and well-nourished. No distress.   HENT:   Head: Normocephalic and atraumatic.   Eyes: Pupils are equal, round, and reactive to light. Conjunctivae are normal.   Neck: Trachea normal. Neck supple. No JVD present.   Cardiovascular: Normal rate, regular rhythm, S1 normal, S2 normal, normal heart sounds and normal pulses. Exam reveals no gallop and no friction rub.   No murmur heard.  Pulmonary/Chest: Effort normal and breath sounds normal.   Abdominal: Soft. Bowel sounds are normal.   Musculoskeletal: Normal range of motion.   Neurological: He is alert and oriented to person, place, and time.   Skin: Skin is warm and dry.   Psychiatric: He has a normal mood and affect. His behavior is normal. Thought content normal.   Vitals reviewed.      Laboratory:  CBC:   Recent Labs   Lab 08/19/19  2305 08/21/19  0425   WBC 8.89 7.22   RBC 4.31* 4.02*   HGB 12.8* 12.1*   HCT 40.8 37.8*    150   MCV 95 94   MCH 29.7 30.1   MCHC 31.4* 32.0     BMP:   Recent Labs   Lab 08/21/19  2336 08/22/19  0446 08/23/19  0808   GLU 84  84 77 78     139 139 141   K 3.5  3.5 3.4* 4.1     109 109 110   CO2 20*  20* 20* 21*   BUN 23  23 22 16   CREATININE 1.5*  1.5* 1.5* 1.4   CALCIUM 9.0  9.0 9.0 9.5     CMP:   Recent Labs "   Lab 19  2336 19  0446 19  0808   GLU 84  84 77 78   CALCIUM 9.0  9.0 9.0 9.5   ALBUMIN 2.9*  2.9* 3.0* 3.4*   PROT 6.4  6.4 6.5 7.3     139 139 141   K 3.5  3.5 3.4* 4.1   CO2 20*  20* 20* 21*     109 109 110   BUN  16   CREATININE 1.5*  1.5* 1.5* 1.4   ALKPHOS 53*  53* 57 63   ALT 19  19 18 21   AST 35  35 34 32     Cardiac Markers: No results for input(s): CKMB, TROPONINT, MYOGLOBIN in the last 168 hours.  Labs within the past 24 hours have been reviewed.    Diagnostic Results:  None    Assessment/Plan:     Stage 3 chronic kidney disease  CKD stage G3a/A1 stable sCr at baseline 1.5 mg/dL.  Stable renal function    · UA with greater than 100 K WBC urine culture in progress empiric antibiotics started by primary team Ceftriaxone  · Acid-Base: increase home dose of sodium Bicarb to 1300 mg BID  · Electrolytes: please get RFP and Mag daily. Stable electrolytes  · Maintain MAP > 65  · Hg >7 if Hg < 7 please transfuse.   · continue to monitor strict I/O's, daily weights, renally dose medications, and avoid nephrotoxic agents        -donor kidney transplant  Induced w Campath 30 mg IV intraoperatively & SoluMedrol 875 mg total over 3 days.   Renal allograft biopsy 17 (DIVINE): 21 glomeruli, none globally sclerosed, <5% interstitial fibrosis, no ACR, c4d negative, AVR CCT Type 2 (V1 lesion); plan THYMO   Renal allograft biopsy 17 (follow-up after rejection treatment): 27 glomeruli, no AVR, no ACR, C4d negative, no microcirculation changes, 5-10% interstitial fibrosis; ?cast --> further studies pending      Long-term use of immunosuppressant medication  - induction with Campath  and solumderol  - Keto 100 mg daily  - continue Prograf 3/2 mg, will need to monitor for toxicities  - Tac Level improving after resuming medications, goal [id number] trough level 4-6  - please hold Cellcept in setting of recurrent UTI  - continue prednisone 5 mg daily        Essential  hypertension  · Acceptable BP, will continue to monitor. Goal for BP is <130 mmHg SBP and BDP <80 mmHg.  · As per         Elevated troponin  As per Cardiology and primary team      Urinary tract infection without hematuria  Urine culture in progress agree with ceftriaxone deescalate 1 sensitivities are back  [place] Cellcept while patient is on antibiotics restart when antibiotics have been completed.          Discharge Planning:  As per primary team    Sagar Gallegos MD  Kidney Transplant  [facility]

## 2019-08-24 NOTE — PLAN OF CARE
Problem: Adult Inpatient Plan of Care  Goal: Plan of Care Review  Outcome: Revised  Pt free of falls/trauma/injuries.  Denies c/o SOB, CP, or discomfort.  Generalized skin remains CDI; No edema noted.  Electrolytes replaced as ordered.  Possible d/c in AM.  Pt tolerating plan of care.

## 2019-08-24 NOTE — SUBJECTIVE & OBJECTIVE
Subjective:   History of Present Illness:  Mr. Burkett is a 68 y.o. 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 Mr. Burkett had  ESRD HD due to HTN  and congenital absent left kidney who was on dialysis since 6/16/10 until receiving [facility] increased risk DDRT (pumped kidney, Campath induction, KDPI 36%, WIT 30 minutes, CIT 14 hours and 14 minutes, donor RPR positive thus patient completed PCN x3, CMV D+/R+) on 16. His baseline creatinine is 1.4-1.6 mg/dL. He takes mycophenolate mofetil, prednisone and tacrolimus  For Immunosuppression.    Post Transplant Course: As per Dr Mcclain  - Pt had DIVINE with suspected rejection on 17.  Kid bx  revealed AVR  Had thymo x 5-7 doses. DSA neg.  -  - He was rebiopsied on 17 which showed acute tubular injury and vacuolization, with + myoglobulin and negative heme.  - Recurrent UTI  Mr Alin Burkett is admitted to Hospital Medicine secondary to an ablation that he will have by Dr Mcmanus.his Cr is 1.5 mg/dL. He is having his ablation today. Renal function is stable. Encourage hydration. KTM cx for Kidney allograft assessment and IS management.    Mr. Burkett is a 69 y.o. year old male who is status post Kidney Transplant - 2016  (#1).    His maintenance immunosuppression consists of:   Immunosuppressants (From admission, onward)    Start     Stop Route Frequency Ordered    19 1800  tacrolimus capsule 2 mg      -- Oral Daily 19 0009    19 0009  tacrolimus capsule 3 mg      -- Oral Daily 19 0009          Interval History:  Patient evaluated bedside, feeling better, no acute distress, abdominal pain much improved.  Night uneventful.  Stable vital signs.  Good urine output.    Past Medical, Surgical, Family, and Social History:   Unchanged from H&P.    Scheduled Meds:   aspirin  81 mg Oral Daily    calcitRIOL  0.25 mcg Oral Daily    cefTRIAXone (ROCEPHIN) IVPB  1 g  "Intravenous Q24H    famotidine  20 mg Oral QHS    ketoconazole  100 mg Oral Daily    levothyroxine  100 mcg Oral Daily    magnesium oxide  400 mg Oral Daily    metoprolol tartrate  12.5 mg Oral BID    potassium, sodium phosphates  2 packet Oral Once    predniSONE  5 mg Oral Daily    sodium bicarbonate  1,300 mg Oral BID    tacrolimus  2 mg Oral Daily    tacrolimus  3 mg Oral Daily     Continuous Infusions:  PRN Meds:acetaminophen, HYDROcodone-acetaminophen, HYDROmorphone, lidocaine HCL 20 mg/ml (2%), ondansetron, ondansetron, polyethylene glycol, promethazine, sodium chloride 0.9%    Intake/Output - Last 3 Shifts       08/22 0700 - 08/23 0659 08/23 0700 - 08/24 0659 08/24 0700 - 08/25 0659    P.O.  480 360    I.V. (mL/kg) 800 (11.9)      Total Intake(mL/kg) 800 (11.9) 480 (7.3) 360 (5.5)    Urine (mL/kg/hr) 925 (0.6)      Total Output 925      Net -125 +480 +360           Urine Occurrence  1 x            Review of Systems   Objective:     Vital Signs (Most Recent):  Temp: 98.6 °F (37 °C) (08/24/19 0823)  Pulse: 76 (08/24/19 0823)  Resp: 15 (08/24/19 0823)  BP: 131/88 (08/24/19 0823)  SpO2: 97 % (08/24/19 0823) Vital Signs (24h Range):  Temp:  [98.6 °F (37 °C)-99.4 °F (37.4 °C)] 98.6 °F (37 °C)  Pulse:  [62-76] 76  Resp:  [15-18] 15  SpO2:  [95 %-98 %] 97 %  BP: (115-136)/(76-88) 131/88     Weight: 66 kg (145 lb 8.1 oz)  Height: 5' 11" (180.3 cm)  Body mass index is 20.29 kg/m².    Physical Exam   Constitutional: He is oriented to person, place, and time. He appears well-developed and well-nourished. No distress.   HENT:   Head: Normocephalic and atraumatic.   Eyes: Pupils are equal, round, and reactive to light. Conjunctivae are normal.   Neck: Trachea normal. Neck supple. No JVD present.   Cardiovascular: Normal rate, regular rhythm, S1 normal, S2 normal, normal heart sounds and normal pulses. Exam reveals no gallop and no friction rub.   No murmur heard.  Pulmonary/Chest: Effort normal and breath sounds " normal.   Abdominal: Soft. Bowel sounds are normal.   Musculoskeletal: Normal range of motion.   Neurological: He is alert and oriented to person, place, and time.   Skin: Skin is warm and dry.   Psychiatric: He has a normal mood and affect. His behavior is normal. Thought content normal.   Nursing note and vitals reviewed.      Laboratory:  CBC:   Recent Labs   Lab 08/19/19  2305 08/21/19  0425   WBC 8.89 7.22   RBC 4.31* 4.02*   HGB 12.8* 12.1*   HCT 40.8 37.8*    150   MCV 95 94   MCH 29.7 30.1   MCHC 31.4* 32.0     CMP:   Recent Labs   Lab 08/22/19  0446 08/23/19  0808 08/24/19  0351   GLU 77 78 82   CALCIUM 9.0 9.5 9.1   ALBUMIN 3.0* 3.4* 2.7*   PROT 6.5 7.3 6.2    141 142   K 3.4* 4.1 3.9   CO2 20* 21* 24    110 110   BUN 22 16 19   CREATININE 1.5* 1.4 1.6*   ALKPHOS 57 63 53*   ALT 18 21 15   AST 34 32 19     Labs within the past 24 hours have been reviewed.

## 2019-08-24 NOTE — ASSESSMENT & PLAN NOTE
Continue regimen of prograf, and prednisone cell cept on hold d/t abx  - Daily prograf level ordered  - Consider renal transplant sonogram as indicated in consultation with KTM  - Continue sodium bicarbonate  - C/w calcitriol

## 2019-08-24 NOTE — PLAN OF CARE
Problem: Adult Inpatient Plan of Care  Goal: Plan of Care Review  Patient remains free from falls and injuries through out shift. Patient AAO and VSS.  Patient denies chest pain and SOB.  Special contact precautions maintained as ordered. Stool sample collected as ordered. No results at this time. Patient's wife at bedside. Plan of care reviewed with patient. Patient verbalizes understanding of plan.  Will continue to monitor.

## 2019-08-24 NOTE — ASSESSMENT & PLAN NOTE
Urine culture in progress agree with ceftriaxone deescalate 1 sensitivities are back  [place] Cellcept while patient is on antibiotics restart when antibiotics have been completed.

## 2019-08-24 NOTE — ASSESSMENT & PLAN NOTE
- urinalysis grossly positive  - immunosupressed, KTM managing, started on CTX  h/o Klebsiella earlier this year, now with recurrent Klebsiella infection.  Consider Urology f/u to see if he needs source clearance ie bladder stones etc.

## 2019-08-24 NOTE — SUBJECTIVE & OBJECTIVE
Interval History: He's ready to go home but understands he has a recurrent infection that needs to be treated properly and diagnosed.  He denies cp, palp, urinary symptoms.     Review of Systems   Constitutional: Negative for activity change, appetite change, chills, fatigue and fever.   HENT: Negative for congestion, sore throat and trouble swallowing.    Eyes: Negative for redness and visual disturbance.   Respiratory: Negative for cough, shortness of breath and wheezing.    Cardiovascular: Negative for chest pain, palpitations and leg swelling.   Gastrointestinal: Negative for abdominal pain, constipation, diarrhea, nausea and vomiting.   Endocrine: Negative for cold intolerance and heat intolerance.   Genitourinary: Negative for decreased urine volume, difficulty urinating and hematuria.   Musculoskeletal: Positive for back pain. Negative for myalgias.   Skin: Negative for color change, rash and wound.   Allergic/Immunologic: Negative for immunocompromised state.   Neurological: Negative for dizziness, weakness, light-headedness, numbness and headaches.   Hematological: Does not bruise/bleed easily.   Psychiatric/Behavioral: Negative for agitation, decreased concentration and sleep disturbance. The patient is not nervous/anxious.      Objective:     Vital Signs (Most Recent):  Temp: 97.8 °F (36.6 °C) (08/24/19 1139)  Pulse: 64 (08/24/19 1400)  Resp: 18 (08/24/19 1139)  BP: (!) 140/89 (08/24/19 1139)  SpO2: 98 % (08/24/19 1139) Vital Signs (24h Range):  Temp:  [97.8 °F (36.6 °C)-98.9 °F (37.2 °C)] 97.8 °F (36.6 °C)  Pulse:  [60-78] 64  Resp:  [15-18] 18  SpO2:  [96 %-98 %] 98 %  BP: (123-140)/(80-89) 140/89     Weight: 66 kg (145 lb 8.1 oz)  Body mass index is 20.29 kg/m².    Intake/Output Summary (Last 24 hours) at 8/24/2019 1515  Last data filed at 8/24/2019 0800  Gross per 24 hour   Intake 840 ml   Output --   Net 840 ml      Physical Exam   Constitutional: He is oriented to person, place, and time. He appears  well-developed and well-nourished. No distress.   HENT:   Head: Normocephalic and atraumatic.   Right Ear: External ear normal.   Left Ear: External ear normal.   Nose: Nose normal.   Eyes: Conjunctivae and EOM are normal.   Neck: Normal range of motion. Neck supple. No JVD present.   Cardiovascular: Normal rate, regular rhythm and intact distal pulses.   Murmur heard.  Pulmonary/Chest: Effort normal and breath sounds normal. No respiratory distress. He has no wheezes. He has no rales.   Abdominal: Soft. Bowel sounds are normal. He exhibits no distension and no mass. There is no tenderness. There is no guarding.   Musculoskeletal: Normal range of motion. He exhibits no edema.   Neurological: He is alert and oriented to person, place, and time. No cranial nerve deficit or sensory deficit. Coordination normal.   Skin: Skin is warm and dry. No rash noted. He is not diaphoretic. No erythema.   Bilateral groin dressings intact, no s/s hematoma or bleeding     Psychiatric: He has a normal mood and affect. His behavior is normal. Judgment and thought content normal.   Nursing note and vitals reviewed.      Significant Labs:   BMP:   Recent Labs   Lab 08/24/19  0351   GLU 82      K 3.9      CO2 24   BUN 19   CREATININE 1.6*   CALCIUM 9.1   MG 2.0     Urine Culture:   Recent Labs   Lab 08/23/19  0332   LABURIN KLEBSIELLA PNEUMONIAE  >100,000 cfu/ml  Susceptibility pending  *     Urine Studies:   Recent Labs   Lab 08/23/19  0332   COLORU Yellow   APPEARANCEUA Cloudy*   PHUR 6.0   SPECGRAV 1.010   PROTEINUA Negative   GLUCUA Negative   KETONESU Negative   BILIRUBINUA Negative   OCCULTUA 1+*   NITRITE Positive*   LEUKOCYTESUR 3+*   RBCUA 2   WBCUA >100*   BACTERIA Moderate*   SQUAMEPITHEL 0       Significant Imaging: I have reviewed all pertinent imaging results/findings within the past 24 hours.

## 2019-08-24 NOTE — SUBJECTIVE & OBJECTIVE
Subjective:   History of Present Illness:  Mr. Burkett is a 68 y.o. 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 Mr. Burkett had  ESRD HD due to HTN  and congenital absent left kidney who was on dialysis since 6/16/10 until receiving [facility] increased risk DDRT (pumped kidney, Campath induction, KDPI 36%, WIT 30 minutes, CIT 14 hours and 14 minutes, donor RPR positive thus patient completed PCN x3, CMV D+/R+) on 16. His baseline creatinine is 1.4-1.6 mg/dL. He takes mycophenolate mofetil, prednisone and tacrolimus  For Immunosuppression.    Post Transplant Course: As per Dr Mcclain  - Pt had DIVINE with suspected rejection on 17.  Kid bx  revealed AVR  Had thymo x 5-7 doses. DSA neg.  -  - He was rebiopsied on 17 which showed acute tubular injury and vacuolization, with + myoglobulin and negative heme.  - Recurrent UTI  Mr Alin Burkett is admitted to Hospital Medicine secondary to an ablation that he will have by Dr Mcmanus.his Cr is 1.5 mg/dL. He is having his ablation today. Renal function is stable. Encourage hydration. KTM cx for Kidney allograft assessment and IS management.    Mr. Burkett is a 69 y.o. year old male who is status post Kidney Transplant - 2016  (#1).    His maintenance immunosuppression consists of:   Immunosuppressants (From admission, onward)    Start     Stop Route Frequency Ordered    19 1800  tacrolimus capsule 2 mg      -- Oral Daily 19 0009    19 0900  mycophenolate capsule 750 mg      -- Oral 2 times daily 19 2313    19 0009  tacrolimus capsule 3 mg      -- Oral Daily 19 0009        Interval History:  Tolerated well ablation with a very stable serum creatinine of 1.4.  Lower and was best of his baseline.  Patient making good urine stable.  He missed his doses yesterday Prograf level 3.1 after medication restarted will continue to trend.    Past Medical, Surgical,  Family, and Social History:   Unchanged from H&P.    Scheduled Meds:   aspirin  81 mg Oral Daily    calcitRIOL  0.25 mcg Oral Daily    cefTRIAXone (ROCEPHIN) IVPB  1 g Intravenous Q24H    famotidine  20 mg Oral QHS    ketoconazole  100 mg Oral Daily    levothyroxine  100 mcg Oral Daily    magnesium oxide  400 mg Oral Daily    metoprolol tartrate  12.5 mg Oral BID    mycophenolate  750 mg Oral BID    predniSONE  5 mg Oral Daily    sodium bicarbonate  1,300 mg Oral BID    tacrolimus  2 mg Oral Daily    tacrolimus  3 mg Oral Daily     Continuous Infusions:  PRN Meds:acetaminophen, HYDROcodone-acetaminophen, HYDROmorphone, lidocaine HCL 20 mg/ml (2%), ondansetron, ondansetron, polyethylene glycol, promethazine, sodium chloride 0.9%    Intake/Output - Last 3 Shifts       08/21 0700 - 08/22 0659 08/22 0700 - 08/23 0659 08/23 0700 - 08/24 0659    I.V. (mL/kg)  800 (11.9)     Total Intake(mL/kg)  800 (11.9)     Urine (mL/kg/hr)  925 (0.6)     Total Output  925     Net  -125                   Review of Systems   Constitutional: Negative for appetite change, fatigue and fever.   HENT: Negative for facial swelling, hearing loss and tinnitus.    Eyes: Negative for pain and visual disturbance.   Respiratory: Negative for chest tightness and shortness of breath.    Cardiovascular: Negative for chest pain and leg swelling.   Gastrointestinal: Negative for abdominal pain, nausea and vomiting.   Genitourinary: Negative for difficulty urinating, dysuria and flank pain.   Musculoskeletal: Positive for back pain. Negative for arthralgias and myalgias.   Skin: Negative for color change.   Allergic/Immunologic: Positive for immunocompromised state.   Neurological: Negative for dizziness, syncope, weakness, light-headedness and headaches.   Psychiatric/Behavioral: Negative for behavioral problems and confusion.      Objective:     Vital Signs (Most Recent):  Temp: 98.8 °F (37.1 °C) (08/23/19 1557)  Pulse: 67 (08/23/19  "1931)  Resp: 18 (08/23/19 1557)  BP: 123/80 (08/23/19 1557)  SpO2: 96 % (08/23/19 1557) Vital Signs (24h Range):  Temp:  [96.4 °F (35.8 °C)-99.4 °F (37.4 °C)] 98.8 °F (37.1 °C)  Pulse:  [62-77] 67  Resp:  [18] 18  SpO2:  [95 %-97 %] 96 %  BP: (115-143)/(76-91) 123/80     Weight: 67.2 kg (148 lb 2.4 oz)  Height: 5' 11" (180.3 cm)  Body mass index is 20.66 kg/m².    Physical Exam   Constitutional: He is oriented to person, place, and time. He appears well-developed and well-nourished. No distress.   HENT:   Head: Normocephalic and atraumatic.   Eyes: Pupils are equal, round, and reactive to light. Conjunctivae are normal.   Neck: Trachea normal. Neck supple. No JVD present.   Cardiovascular: Normal rate, regular rhythm, S1 normal, S2 normal, normal heart sounds and normal pulses. Exam reveals no gallop and no friction rub.   No murmur heard.  Pulmonary/Chest: Effort normal and breath sounds normal.   Abdominal: Soft. Bowel sounds are normal.   Musculoskeletal: Normal range of motion.   Neurological: He is alert and oriented to person, place, and time.   Skin: Skin is warm and dry.   Psychiatric: He has a normal mood and affect. His behavior is normal. Thought content normal.   Vitals reviewed.      Laboratory:  CBC:   Recent Labs   Lab 08/19/19  2305 08/21/19  0425   WBC 8.89 7.22   RBC 4.31* 4.02*   HGB 12.8* 12.1*   HCT 40.8 37.8*    150   MCV 95 94   MCH 29.7 30.1   MCHC 31.4* 32.0     BMP:   Recent Labs   Lab 08/21/19  2336 08/22/19  0446 08/23/19  0808   GLU 84  84 77 78     139 139 141   K 3.5  3.5 3.4* 4.1     109 109 110   CO2 20*  20* 20* 21*   BUN 23  23 22 16   CREATININE 1.5*  1.5* 1.5* 1.4   CALCIUM 9.0  9.0 9.0 9.5     CMP:   Recent Labs   Lab 08/21/19  2336 08/22/19  0446 08/23/19  0808   GLU 84  84 77 78   CALCIUM 9.0  9.0 9.0 9.5   ALBUMIN 2.9*  2.9* 3.0* 3.4*   PROT 6.4  6.4 6.5 7.3     139 139 141   K 3.5  3.5 3.4* 4.1   CO2 20*  20* 20* 21*     109 109 " 110   BUN 23  23 22 16   CREATININE 1.5*  1.5* 1.5* 1.4   ALKPHOS 53*  53* 57 63   ALT 19  19 18 21   AST 35  35 34 32     Cardiac Markers: No results for input(s): CKMB, TROPONINT, MYOGLOBIN in the last 168 hours.  Labs within the past 24 hours have been reviewed.    Diagnostic Results:  None

## 2019-08-24 NOTE — ASSESSMENT & PLAN NOTE
· Acceptable BP, will continue to monitor. Goal for BP is <130 mmHg SBP and BDP <80 mmHg.  · As per HM

## 2019-08-24 NOTE — PROGRESS NOTES
[facility] Hospital Medicine  Progress Note    Patient Name: Alin Burkett  MRN: [id number]  Patient Class: IP- Inpatient   Admission Date: 8/21/2019  Length of Stay: 3 days  Attending Physician: Barbie Garner MD  Primary Care Provider: Carmen Krueger MD    [facility] Medicine Team: [facility] MED MAUREEN Gruber NP    Subjective:     Principal Problem:SVT (supraventricular tachycardia)        HPI:  Patient of concern is a 69M essential HTN, acquired hypothyroidism, AAA s/p repair, hx kidney transplant in 2016 for ESRD due to HTN and congenital absent left kidney (on prograf, cellcept, and prednisone), now with CKD stage 3 c/b anemia of chronic kidney disease here for evaluation of supraventricular tachycardia. The patient presented originally to [facility] with palpitations and burning chest discomfort and was later transferred to [facility] for C to evaluate an elevated troponin. Dr. Simons states that the patient has had palpitations on and off for a year with recurrent syncopal episodes, most recently increased in frequency after his metoprolol was discontinued due to hypotension. The chest discomfort prompting this presentation started after a meal, and he originally attributed them to reflux, taking an antacid with partial relief of the chest pain. Initial workup significant for SVT, for which he was given adenosine at [facility] with resolution, followed later by resuming his metoprolol tartrate 12.5 mg BID. Workup at the time significant for elevated troponin at 1.162, for which he was transferred to [facility] for a left heart catheterization.      His troponin has continued to rise, most recently elevated at 6.9. Most recent ECG showing 1st degree AV block with PACs and LVH. He underwent TTE on 8/20 that showed concentric LVH, LVEF 65%, grade II diastolic dysfunction, and idiopathic hypertrophic subaortic stenosis with FRANCISCO and outflow obstruction (full report  copied below). [facility] official report is pending, but per Dr. Simons had essentially normal coronary arteries. He is now stable on beta blockade with no recurrent chest pain or palpitations. Dr. Simons and Dr. Mcmanus discussed the case, who agreed to consult for ablation at [facility].       Upon arrival to [facility], patient is doing well. Seen at bedside with significant other. He offers no complaints and is joking around.     TTE performed 8/20:  · Concentric left ventricular hypertrophy.  · Normal left ventricular systolic function. The estimated ejection fraction is 65%  · Grade II (moderate) LVDD consistent with pseudonormalization.  · Elevated left atrial pressure.  · Moderate outflow tract left ventricular obstruction is present.  · No wall motion abnormalities.  · Normal right ventricular systolic function.  · Mild mitral regurgitation.  · Mild to moderate tricuspid regurgitation.  · Mild pulmonary hypertension present.  · Normal central venous pressure (3 mm Hg).  The estimated PA systolic pressure is 41 mm Hg    Overview/Hospital Course:  Admitted to hospital medicine for SVT ablation and was found to have a significant UTI (UA +, Cx >100k GNR), subtherapeutic immunosuppression tacrolimus level (? Proper dosing OSH).  EP performed a successful ablation on 8/22.  A urinalysis was sent and was found to be grossly + nitrate, LE, > 100k WBC and Many bacteria.  He had a Klebsiella UTI in January which was pansensitive.  Dr. Ryan from [facility] consulted for subtherapeutic immunosuppression and acute UTI.  Patient was started on CTX pending formal urine cx findings.     Dispo: pending urine cx and final treatment regimen, from a cardiac standpoint he's free to go home and f/u with Dr. Simons his referring cardiologist.     Interval History: He's ready to go home but understands he has a recurrent infection that needs to be treated properly and diagnosed.  He denies cp, palp, urinary symptoms.     Review of Systems    Constitutional: Negative for activity change, appetite change, chills, fatigue and fever.   HENT: Negative for congestion, sore throat and trouble swallowing.    Eyes: Negative for redness and visual disturbance.   Respiratory: Negative for cough, shortness of breath and wheezing.    Cardiovascular: Negative for chest pain, palpitations and leg swelling.   Gastrointestinal: Negative for abdominal pain, constipation, diarrhea, nausea and vomiting.   Endocrine: Negative for cold intolerance and heat intolerance.   Genitourinary: Negative for decreased urine volume, difficulty urinating and hematuria.   Musculoskeletal: Positive for back pain. Negative for myalgias.   Skin: Negative for color change, rash and wound.   Allergic/Immunologic: Negative for immunocompromised state.   Neurological: Negative for dizziness, weakness, light-headedness, numbness and headaches.   Hematological: Does not bruise/bleed easily.   Psychiatric/Behavioral: Negative for agitation, decreased concentration and sleep disturbance. The patient is not nervous/anxious.      Objective:     Vital Signs (Most Recent):  Temp: 97.8 °F (36.6 °C) (08/24/19 1139)  Pulse: 64 (08/24/19 1400)  Resp: 18 (08/24/19 1139)  BP: (!) 140/89 (08/24/19 1139)  SpO2: 98 % (08/24/19 1139) Vital Signs (24h Range):  Temp:  [97.8 °F (36.6 °C)-98.9 °F (37.2 °C)] 97.8 °F (36.6 °C)  Pulse:  [60-78] 64  Resp:  [15-18] 18  SpO2:  [96 %-98 %] 98 %  BP: (123-140)/(80-89) 140/89     Weight: 66 kg (145 lb 8.1 oz)  Body mass index is 20.29 kg/m².    Intake/Output Summary (Last 24 hours) at 8/24/2019 1515  Last data filed at 8/24/2019 0800  Gross per 24 hour   Intake 840 ml   Output --   Net 840 ml      Physical Exam   Constitutional: He is oriented to person, place, and time. He appears well-developed and well-nourished. No distress.   HENT:   Head: Normocephalic and atraumatic.   Right Ear: External ear normal.   Left Ear: External ear normal.   Nose: Nose normal.   Eyes:  Conjunctivae and EOM are normal.   Neck: Normal range of motion. Neck supple. No JVD present.   Cardiovascular: Normal rate, regular rhythm and intact distal pulses.   Murmur heard.  Pulmonary/Chest: Effort normal and breath sounds normal. No respiratory distress. He has no wheezes. He has no rales.   Abdominal: Soft. Bowel sounds are normal. He exhibits no distension and no mass. There is no tenderness. There is no guarding.   Musculoskeletal: Normal range of motion. He exhibits no edema.   Neurological: He is alert and oriented to person, place, and time. No cranial nerve deficit or sensory deficit. Coordination normal.   Skin: Skin is warm and dry. No rash noted. He is not diaphoretic. No erythema.   Bilateral groin dressings intact, no s/s hematoma or bleeding     Psychiatric: He has a normal mood and affect. His behavior is normal. Judgment and thought content normal.   Nursing note and vitals reviewed.      Significant Labs:   BMP:   Recent Labs   Lab 08/24/19  0351   GLU 82      K 3.9      CO2 24   BUN 19   CREATININE 1.6*   CALCIUM 9.1   MG 2.0     Urine Culture:   Recent Labs   Lab 08/23/19  0332   LABURIN KLEBSIELLA PNEUMONIAE  >100,000 cfu/ml  Susceptibility pending  *     Urine Studies:   Recent Labs   Lab 08/23/19  0332   COLORU Yellow   APPEARANCEUA Cloudy*   PHUR 6.0   SPECGRAV 1.010   PROTEINUA Negative   GLUCUA Negative   KETONESU Negative   BILIRUBINUA Negative   OCCULTUA 1+*   NITRITE Positive*   LEUKOCYTESUR 3+*   RBCUA 2   WBCUA >100*   BACTERIA Moderate*   SQUAMEPITHEL 0       Significant Imaging: I have reviewed all pertinent imaging results/findings within the past 24 hours.      Assessment/Plan:      * SVT (supraventricular tachycardia)  - Adenosine responsive  - EP consulted s/p successful ablation of slow pathway modification, VA dissociation in setting of structural heart disease.  - C/w BB  - Add ASA  Discharge instructions:  1. Keep the dressing on, clean, and dry for 24  hours.   2. Please do not lift anything more than 5 lbs for a week  3. After 24 hours, the dressing may be removed and a shower is allowed.   4. Clean the area with mild soap and water and then leave it opened to air.   5. Avoid scrubbing the groin for the next 2 days, do not spray water directly on the site, for the next week, please dab the area lightly to minimize risk of infection  6. Once the skin has healed, bathing in a tub or swimming is allowed.   7. Please do not lift anything more   8. Inspect the groin site daily and report to the physician any bleeding and/or swelling at the site that cannot be controlled with manual pressure for 10 minutes, unusual pain at the access site or affected extremity, unusual swelling at the access site, or signs or symptoms of infection such as redness, pain, or fever.   11. Please follow up with Dr. Mcgowan (referring physician) next week     Call 911 if you have:   -Bleeding from the puncture site that you cannot stop by doing the following:      Relax and lie down right away. Keep your leg flat and apply firm pressure to the site using your fingers and a gauze pad. Keep the pressure on for 20 minutes. Continue this until the bleeding stops. This may take awhile. When bleeding stops, cover the site with a sterile bandage and keep your leg still as much as possible.       UTI due to Klebsiella species  - urinalysis grossly positive  - immunosupressed, KTM managing, started on CTX  h/o Klebsiella earlier this year, now with recurrent Klebsiella infection.  Consider Urology f/u to see if he needs source clearance ie bladder stones etc.       Elevated troponin  - d/t poor renal clearance      Stage 3 chronic kidney disease  - see above       -donor kidney transplant   -KTM following   - holding cell cept d/t abx resume once abx finished      Long-term use of immunosuppressant medication  - subtherapeutic, might not gotten at OSH  - KTM following no change in regimen   -  trending up goal 4-5      LVH (left ventricular hypertrophy) due to hypertensive disease  - Repeat 2D ECHO with doppler : Classic  Hypertrophic subaortic  Stenosis with FRANCISCO and outflow tract obstruction and mitral reguritation ( late).  Septum slightly more thickened than post wall   · Concentric left ventricular hypertrophy.  · Normal left ventricular systolic function. The estimated ejection fraction is 65%  · Grade II (moderate) left ventricular diastolic dysfunction consistent with pseudonormalization.  · Elevated left atrial pressure.  · Moderate outflow tract left ventricular obstruction is present.  · No wall motion abnormalities.  · Normal right ventricular systolic function.  · Mild mitral regurgitation.  · Mild to moderate tricuspid regurgitation.  · Mild pulmonary hypertension present.  · Normal central venous pressure (3 mm Hg).  · The estimated PA systolic pressure is 41 mm Hg     - ep following can f/u with Dr. Simons, stay hydrated    Hypothyroidism  - TSH appropriate   - Continue home levothyroxine        Secondary hyperparathyroidism, renal   Continue regimen of prograf, and prednisone cell cept on hold d/t abx  - Daily prograf level ordered  - Consider renal transplant sonogram as indicated in consultation with KTM  - Continue sodium bicarbonate  - C/w calcitriol      Anemia of chronic disease  - Currently stable        Essential hypertension  - - Currently stable   - Continue to monitor blood pressure while receiving beta-blockade        VTE Risk Mitigation (From admission, onward)        Ordered     IP VTE HIGH RISK PATIENT  Once      08/21/19 2316     Reason for No Pharmacological VTE Prophylaxis  Once      08/21/19 2316                Perri Gruber NP  Department of Hospital Medicine   [facility]

## 2019-08-24 NOTE — ASSESSMENT & PLAN NOTE
- induction with Campath  and solumderol  - Keto 100 mg daily  - continue Prograf 3/2 mg, will need to monitor for toxicities  - Tac Level improving after resuming medications, goal [id number] trough level 4-6  - please hold Cellcept in setting of recurrent UTI  - continue prednisone 5 mg daily

## 2019-08-25 VITALS
RESPIRATION RATE: 18 BRPM | DIASTOLIC BLOOD PRESSURE: 78 MMHG | HEIGHT: 71 IN | BODY MASS INDEX: 20.4 KG/M2 | TEMPERATURE: 99 F | HEART RATE: 72 BPM | WEIGHT: 145.75 LBS | SYSTOLIC BLOOD PRESSURE: 125 MMHG | OXYGEN SATURATION: 99 %

## 2019-08-25 PROBLEM — A04.72 C. DIFFICILE DIARRHEA: Status: ACTIVE | Noted: 2019-08-25

## 2019-08-25 LAB
ALBUMIN SERPL BCP-MCNC: 2.7 G/DL (ref 3.5–5.2)
ALP SERPL-CCNC: 48 U/L (ref 55–135)
ALT SERPL W/O P-5'-P-CCNC: 14 U/L (ref 10–44)
ANION GAP SERPL CALC-SCNC: 8 MMOL/L (ref 8–16)
AST SERPL-CCNC: 16 U/L (ref 10–40)
BACTERIA UR CULT: ABNORMAL
BILIRUB SERPL-MCNC: 0.3 MG/DL (ref 0.1–1)
BUN SERPL-MCNC: 15 MG/DL (ref 8–23)
CALCIUM SERPL-MCNC: 8.8 MG/DL (ref 8.7–10.5)
CHLORIDE SERPL-SCNC: 110 MMOL/L (ref 95–110)
CO2 SERPL-SCNC: 24 MMOL/L (ref 23–29)
CREAT SERPL-MCNC: 1.4 MG/DL (ref 0.5–1.4)
EST. GFR  (AFRICAN AMERICAN): 59.3 ML/MIN/1.73 M^2
EST. GFR  (NON AFRICAN AMERICAN): 51.3 ML/MIN/1.73 M^2
GLUCOSE SERPL-MCNC: 79 MG/DL (ref 70–110)
MAGNESIUM SERPL-MCNC: 1.7 MG/DL (ref 1.6–2.6)
PHOSPHATE SERPL-MCNC: 2.4 MG/DL (ref 2.7–4.5)
POTASSIUM SERPL-SCNC: 3.7 MMOL/L (ref 3.5–5.1)
PROT SERPL-MCNC: 6.1 G/DL (ref 6–8.4)
SODIUM SERPL-SCNC: 142 MMOL/L (ref 136–145)
TACROLIMUS BLD-MCNC: 4.4 NG/ML (ref 5–15)

## 2019-08-25 PROCEDURE — 99233 PR SUBSEQUENT HOSPITAL CARE,LEVL III: ICD-10-PCS | Mod: GC,,, | Performed by: INTERNAL MEDICINE

## 2019-08-25 PROCEDURE — 25000003 PHARM REV CODE 250: Performed by: FAMILY MEDICINE

## 2019-08-25 PROCEDURE — 63600175 PHARM REV CODE 636 W HCPCS: Performed by: HOSPITALIST

## 2019-08-25 PROCEDURE — 25000003 PHARM REV CODE 250: Performed by: NURSE PRACTITIONER

## 2019-08-25 PROCEDURE — 99239 PR HOSPITAL DISCHARGE DAY,>30 MIN: ICD-10-PCS | Mod: ,,, | Performed by: NURSE PRACTITIONER

## 2019-08-25 PROCEDURE — 80053 COMPREHEN METABOLIC PANEL: CPT

## 2019-08-25 PROCEDURE — 63600175 PHARM REV CODE 636 W HCPCS: Performed by: NURSE PRACTITIONER

## 2019-08-25 PROCEDURE — 83735 ASSAY OF MAGNESIUM: CPT

## 2019-08-25 PROCEDURE — 80197 ASSAY OF TACROLIMUS: CPT

## 2019-08-25 PROCEDURE — 84100 ASSAY OF PHOSPHORUS: CPT

## 2019-08-25 PROCEDURE — 25000003 PHARM REV CODE 250: Performed by: HOSPITALIST

## 2019-08-25 PROCEDURE — 99239 HOSP IP/OBS DSCHRG MGMT >30: CPT | Mod: ,,, | Performed by: NURSE PRACTITIONER

## 2019-08-25 PROCEDURE — 63600175 PHARM REV CODE 636 W HCPCS: Performed by: FAMILY MEDICINE

## 2019-08-25 PROCEDURE — 99233 SBSQ HOSP IP/OBS HIGH 50: CPT | Mod: GC,,, | Performed by: INTERNAL MEDICINE

## 2019-08-25 RX ORDER — POTASSIUM CHLORIDE 750 MG/1
40 CAPSULE, EXTENDED RELEASE ORAL ONCE
Status: COMPLETED | OUTPATIENT
Start: 2019-08-25 | End: 2019-08-25

## 2019-08-25 RX ORDER — LEVOFLOXACIN 750 MG/1
750 TABLET ORAL EVERY OTHER DAY
Status: DISCONTINUED | OUTPATIENT
Start: 2019-08-26 | End: 2019-08-25 | Stop reason: HOSPADM

## 2019-08-25 RX ORDER — LEVOFLOXACIN 750 MG/1
750 TABLET ORAL EVERY OTHER DAY
Qty: 5 TABLET | Refills: 0 | Status: ON HOLD | OUTPATIENT
Start: 2019-08-26 | End: 2019-11-06 | Stop reason: HOSPADM

## 2019-08-25 RX ORDER — ASPIRIN 81 MG/1
81 TABLET ORAL DAILY
Refills: 0 | COMMUNITY
Start: 2019-08-25 | End: 2020-01-01

## 2019-08-25 RX ADMIN — Medication 125 MG: at 11:08

## 2019-08-25 RX ADMIN — KETOCONAZOLE 100 MG: 200 TABLET ORAL at 08:08

## 2019-08-25 RX ADMIN — CALCITRIOL CAPSULES 0.25 MCG 0.25 MCG: 0.25 CAPSULE ORAL at 08:08

## 2019-08-25 RX ADMIN — ASPIRIN 81 MG: 81 TABLET, COATED ORAL at 08:08

## 2019-08-25 RX ADMIN — LEVOTHYROXINE SODIUM 100 MCG: 50 TABLET ORAL at 06:08

## 2019-08-25 RX ADMIN — CEFTRIAXONE SODIUM 1 G: 1 INJECTION, POWDER, FOR SOLUTION INTRAMUSCULAR; INTRAVENOUS at 11:08

## 2019-08-25 RX ADMIN — METOPROLOL TARTRATE 12.5 MG: 25 TABLET, FILM COATED ORAL at 08:08

## 2019-08-25 RX ADMIN — PREDNISONE 5 MG: 5 TABLET ORAL at 08:08

## 2019-08-25 RX ADMIN — POTASSIUM CHLORIDE 40 MEQ: 750 CAPSULE, EXTENDED RELEASE ORAL at 08:08

## 2019-08-25 RX ADMIN — TACROLIMUS 3 MG: 1 CAPSULE ORAL at 08:08

## 2019-08-25 RX ADMIN — Medication 400 MG: at 08:08

## 2019-08-25 RX ADMIN — SODIUM BICARBONATE 650 MG TABLET 1300 MG: at 08:08

## 2019-08-25 NOTE — ASSESSMENT & PLAN NOTE
Continue regimen of prograf, and prednisone cell cept on hold d/t abx  - Daily prograf level trending up to goal  - Consider renal transplant sonogram as indicated in consultation with KTM  - Continue sodium bicarbonate  - C/w calcitriol

## 2019-08-25 NOTE — DISCHARGE SUMMARY
[facility] Hospital Medicine  Discharge Summary      Patient Name: Alin Burkett  MRN: [id number]  Admission Date: 8/21/2019  Hospital Length of Stay: 4 days  Discharge Date and Time:  08/25/2019 1:15 PM  Attending Physician: Barbie Garner MD   Discharging Provider: Perri Gruber NP  Primary Care Provider: Carmen Krueger MD  [facility] Medicine Team: [facility] MED  Perri Gruber NP    HPI:   Patient of concern is a 69M essential HTN, acquired hypothyroidism, AAA s/p repair, hx kidney transplant in 2016 for ESRD due to HTN and congenital absent left kidney (on prograf, cellcept, and prednisone), now with CKD stage 3 c/b anemia of chronic kidney disease here for evaluation of supraventricular tachycardia. The patient presented originally to [facility] with palpitations and burning chest discomfort and was later transferred to [facility] for C to evaluate an elevated troponin. Dr. Simons states that the patient has had palpitations on and off for a year with recurrent syncopal episodes, most recently increased in frequency after his metoprolol was discontinued due to hypotension. The chest discomfort prompting this presentation started after a meal, and he originally attributed them to reflux, taking an antacid with partial relief of the chest pain. Initial workup significant for SVT, for which he was given adenosine at [facility] with resolution, followed later by resuming his metoprolol tartrate 12.5 mg BID. Workup at the time significant for elevated troponin at 1.162, for which he was transferred to [facility] for a left heart catheterization.      His troponin has continued to rise, most recently elevated at 6.9. Most recent ECG showing 1st degree AV block with PACs and LVH. He underwent TTE on 8/20 that showed concentric LVH, LVEF 65%, grade II diastolic dysfunction, and idiopathic hypertrophic subaortic stenosis with FRANCISCO and outflow obstruction (full report  copied below). [facility] official report is pending, but per Dr. Simons had essentially normal coronary arteries. He is now stable on beta blockade with no recurrent chest pain or palpitations. Dr. Simons and Dr. Mcmanus discussed the case, who agreed to consult for ablation at [facility].       Upon arrival to [facility], patient is doing well. Seen at bedside with significant other. He offers no complaints and is joking around.     TTE performed 8/20:  · Concentric left ventricular hypertrophy.  · Normal left ventricular systolic function. The estimated ejection fraction is 65%  · Grade II (moderate) LVDD consistent with pseudonormalization.  · Elevated left atrial pressure.  · Moderate outflow tract left ventricular obstruction is present.  · No wall motion abnormalities.  · Normal right ventricular systolic function.  · Mild mitral regurgitation.  · Mild to moderate tricuspid regurgitation.  · Mild pulmonary hypertension present.  · Normal central venous pressure (3 mm Hg).  The estimated PA systolic pressure is 41 mm Hg    Procedure(s) (LRB):  ABLATION, SVT (N/A)      Hospital Course:   Admitted to hospital medicine for SVT ablation and was found to have a significant UTI (UA +, Cx >100k GNR, recurrent klebsiella), diarrhea (+ c diff), subtherapeutic immunosuppression tacrolimus level (? Proper dosing OSH).      EP performed a successful ablation on 8/22.  A urinalysis was sent and was found to be grossly + nitrate, LE, > 100k WBC and Many bacteria.  He had a Klebsiella UTI in January which was pansensitive. 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 Urethrotome used to open strictured area. He also has a large mouth bladder diverticula with multiple outpouchings. He is being sent home on levofloxacin (adjusted for CRcl of 47 so 750 qod and already having had 3 days of CTX  prior to discharge)  Dr. Ryan and Dr. Rayray Moya from [facility] consulted for subtherapeutic immunosuppression, acute UTI and now C diff diarrhea. Patient started on 10 day oral course of oral vancomycin, discussed and approved by transplant medicine.  Cell cept on hold while taking antibiotics per [facility].     Dispo: home today with f/u with Dr. Simons his referring cardiologist and Dr. Mann urologist for recurrent UTI's     Consults:   Consults (From admission, onward)        Status Ordering Provider     Inpatient consult to Electrophysiology  Once     Provider:  (Not yet assigned)    Completed GIULIA SZYMANSKI     IP Consult to Kidney/Pancreas Transplant Medicine  Once     Provider:  (Not yet assigned)    Completed GIULIA SZYMANSKI      Review of Systems   Constitutional: Negative for activity change, appetite change, chills, fatigue and fever.   HENT: Negative for congestion, sore throat and trouble swallowing.    Eyes: Negative for redness and visual disturbance.   Respiratory: Negative for cough, shortness of breath and wheezing.    Cardiovascular: Negative for chest pain, palpitations and leg swelling.   Gastrointestinal: Negative for abdominal pain, constipation, diarrhea, nausea and vomiting.   Endocrine: Negative for cold intolerance and heat intolerance.   Genitourinary: Negative for decreased urine volume, difficulty urinating and hematuria.   Musculoskeletal: Positive for back pain. Negative for myalgias.   Skin: Negative for color change, rash and wound.   Allergic/Immunologic: Negative for immunocompromised state.   Neurological: Negative for dizziness, weakness, light-headedness, numbness and headaches.   Hematological: Does not bruise/bleed easily.   Psychiatric/Behavioral: Negative for agitation, decreased concentration and sleep disturbance. The patient is not nervous/anxious.      Physical Exam   Constitutional: He is oriented to person, place, and time. He appears well-developed and  well-nourished. No distress.   HENT:   Head: Normocephalic and atraumatic.   Right Ear: External ear normal.   Left Ear: External ear normal.   Nose: Nose normal.   Eyes: Conjunctivae and EOM are normal.   Neck: Normal range of motion. Neck supple. No JVD present.   Cardiovascular: Normal rate, regular rhythm and intact distal pulses.   Murmur heard.  Pulmonary/Chest: Effort normal and breath sounds normal. No respiratory distress. He has no wheezes. He has no rales.   Abdominal: Soft. Bowel sounds are normal. He exhibits no distension and no mass. There is no tenderness. There is no guarding.   Musculoskeletal: Normal range of motion. He exhibits no edema.   Neurological: He is alert and oriented to person, place, and time. No cranial nerve deficit or sensory deficit. Coordination normal.   Skin: Skin is warm and dry. No rash noted. He is not diaphoretic. No erythema.   Bilateral groin dressings intact, no s/s hematoma or bleeding     Psychiatric: He has a normal mood and affect. His behavior is normal. Judgment and thought content normal.     * SVT (supraventricular tachycardia)  - Adenosine responsive  - EP consulted s/p successful ablation of slow pathway modification, VA dissociation in setting of structural heart disease.  - C/w BB  - Add ASA  Discharge instructions:  1. Keep the dressing on, clean, and dry for 24 hours.   2. Please do not lift anything more than 5 lbs for a week  3. After 24 hours, the dressing may be removed and a shower is allowed.   4. Clean the area with mild soap and water and then leave it opened to air.   5. Avoid scrubbing the groin for the next 2 days, do not spray water directly on the site, for the next week, please dab the area lightly to minimize risk of infection  6. Once the skin has healed, bathing in a tub or swimming is allowed.   7. Please do not lift anything more   8. Inspect the groin site daily and report to the physician any bleeding and/or swelling at the site that  cannot be controlled with manual pressure for 10 minutes, unusual pain at the access site or affected extremity, unusual swelling at the access site, or signs or symptoms of infection such as redness, pain, or fever.   11. Please follow up with Dr. Mcgowan (referring physician) next week     Call 911 if you have:   -Bleeding from the puncture site that you cannot stop by doing the following:      Relax and lie down right away. Keep your leg flat and apply firm pressure to the site using your fingers and a gauze pad. Keep the pressure on for 20 minutes. Continue this until the bleeding stops. This may take awhile. When bleeding stops, cover the site with a sterile bandage and keep your leg still as much as possible.       C. difficile diarrhea  - started on 10 day oral vanc       UTI due to Klebsiella species  - urinalysis grossly positive  - immunosupressed, [facility] managing, started on CTX x3 days recurrent Klebsiella as also what he had earlier this year.  Urology f/u h/o urethral stensoses which probably preventing complete bladder emptying and retention.       Elevated troponin  - d/t poor renal clearance      Stage 3 chronic kidney disease  - see above       -donor kidney transplant   -[facility] following   - holding cell cept d/t abx resume once abx finished      Long-term use of immunosuppressant medication  - subtherapeutic, might not gotten at OSH  - [facility] following no change in prograf, MMF on hold while taking abx   - trending up goal 4-5      LVH (left ventricular hypertrophy) due to hypertensive disease  - Repeat 2D ECHO with doppler : Classic  Hypertrophic subaortic  Stenosis with FRANCISCO and outflow tract obstruction and mitral reguritation ( late).  Septum slightly more thickened than post wall   · Concentric left ventricular hypertrophy.  · Normal left ventricular systolic function. The estimated ejection fraction is 65%  · Grade II (moderate) left ventricular diastolic dysfunction consistent with  pseudonormalization.  · Elevated left atrial pressure.  · Moderate outflow tract left ventricular obstruction is present.  · No wall motion abnormalities.  · Normal right ventricular systolic function.  · Mild mitral regurgitation.  · Mild to moderate tricuspid regurgitation.  · Mild pulmonary hypertension present.  · Normal central venous pressure (3 mm Hg).  · The estimated PA systolic pressure is 41 mm Hg     - ep following can f/u with Dr. Simons, stay hydrated    Hypothyroidism  - TSH appropriate   - Continue home levothyroxine        Secondary hyperparathyroidism, renal   Continue regimen of prograf, and prednisone cell cept on hold d/t abx  - Daily prograf level trending up to goal  - Consider renal transplant sonogram as indicated in consultation with [facility]  - Continue sodium bicarbonate  - C/w calcitriol      Anemia of chronic disease  - Currently stable        Essential hypertension  - - Currently stable   - Continue to monitor blood pressure while receiving beta-blockade        Final Active Diagnoses:    Diagnosis Date Noted POA    PRINCIPAL PROBLEM:  SVT (supraventricular tachycardia) [I47.1] 2019 Yes     Chronic    C. difficile diarrhea [A04.72] 2019 Yes    UTI due to Klebsiella species [N39.0, B96.1] 2019 Yes    Elevated troponin [R74.8] 2019 Yes    Stage 3 chronic kidney disease [N18.3] 2017 Yes     Chronic    -donor kidney transplant [Z94.0]  Not Applicable    Long-term use of immunosuppressant medication [Z79.899] 2016 Not Applicable    LVH (left ventricular hypertrophy) due to hypertensive disease [I11.9] 2015 Yes    Hypothyroidism [E03.9] 01/10/2014 Yes     Chronic    Essential hypertension [I10]  Yes     Chronic    Anemia of chronic disease [D63.8]  Yes     Chronic    Secondary hyperparathyroidism, renal [N25.81]  Yes     Chronic      Problems Resolved During this Admission:       Discharged Condition: good    Disposition: Home or Self  Care    Follow Up:  Follow-up Information     Shari Arechiga MD .    Specialties:  Cardiovascular Disease, Cardiology  Contact information:  [address]  [address]  [place] [address]  [phone]                 Patient Instructions:      Ambulatory referral to Urology   Referral Priority: Routine Referral Type: Consultation   Referral Reason: Specialty Services Required   Requested Specialty: Urology   Number of Visits Requested: 1     Ambulatory referral to Cardiology   Referral Priority: Routine Referral Type: Consultation   Referral Reason: Specialty Services Required   Referred to Provider: SHARI ARECHIGA Requested Specialty: Cardiology   Number of Visits Requested: 1     Diet Cardiac     Notify your health care provider if you experience any of the following:  temperature >100.4     Notify your health care provider if you experience any of the following:  persistent nausea and vomiting or diarrhea     Notify your health care provider if you experience any of the following:  severe uncontrolled pain     Notify your health care provider if you experience any of the following:  redness, tenderness, or signs of infection (pain, swelling, redness, odor or green/yellow discharge around incision site)     Notify your health care provider if you experience any of the following:  difficulty breathing or increased cough     Notify your health care provider if you experience any of the following:  severe persistent headache     Notify your health care provider if you experience any of the following:  worsening rash     Notify your health care provider if you experience any of the following:  persistent dizziness, light-headedness, or visual disturbances     Notify your health care provider if you experience any of the following:  increased confusion or weakness     Activity as tolerated       Significant Diagnostic Studies: Labs: All labs within the past 24 hours have been reviewed    Pending Diagnostic Studies:      Procedure Component Value Units Date/Time    Stool Exam-Ova,Cysts,Parasites [[id number]] Collected:  08/24/19 1145    Order Status:  Sent Lab Status:  In process Updated:  08/24/19 1304    Specimen:  Stool          Medications:  Reconciled Home Medications:      Medication List      START taking these medications    aspirin 81 MG EC tablet  Commonly known as:  ECOTRIN  Take 1 tablet (81 mg total) by mouth once daily.  Replaces:  aspirin 325 MG tablet     levoFLOXacin 750 MG tablet  Commonly known as:  LEVAQUIN  Take 1 tablet (750 mg total) by mouth every other day.  Start taking on:  8/26/2019     vancomycin 250mg / 10ml Susp  Take 5 mLs (125 mg total) by mouth every 6 (six) hours. for 10 days        CONTINUE taking these medications    calcitRIOL 0.25 MCG Cap  Commonly known as:  ROCALTROL  Take 1 capsule (0.25 mcg total) by mouth once daily.     famotidine 20 MG tablet  Commonly known as:  PEPCID  Take 1 tablet (20 mg total) by mouth every evening.     k phos di & mono-sod phos mono 250 mg Tab  Commonly known as:  K-PHOS-NEUTRAL  Take 3 tablets by mouth 2 (two) times daily.     ketoconazole 200 mg Tab  Commonly known as:  NIZORAL  Take 0.5 tablets (100 mg total) by mouth once daily.     levothyroxine 100 MCG tablet  Commonly known as:  SYNTHROID  Take 1 tablet (100 mcg total) by mouth once daily.     magnesium oxide 400 mg (241.3 mg magnesium) tablet  Commonly known as:  MAG-OX  Take 1 tablet (400 mg total) by mouth once daily.     metoprolol tartrate 25 MG tablet  Commonly known as:  LOPRESSOR  Take 0.5 tablets (12.5 mg total) by mouth 2 (two) times daily.     ONE DAILY MULTIVITAMIN per tablet  Generic drug:  multivitamin  Take 1 tablet by mouth once daily.     predniSONE 5 MG tablet  Commonly known as:  DELTASONE  Take 1 tablet (5 mg total) by mouth once daily. Z94.0/Kidney transplant on 11/26/2016     sodium bicarbonate 650 MG tablet  Take 1 tablet (650 mg total) by mouth 2 (two) times daily.     tacrolimus 1  MG Cap  Commonly known as:  PROGRAF  Take 3 capsules (3 mg total) by mouth every morning AND 2 capsules (2 mg total) every evening. Z94.0/Kidney Transplant on 11/26/16.        STOP taking these medications    aspirin 325 MG tablet  Replaced by:  aspirin 81 MG EC tablet     mycophenolate 250 mg Cap  Commonly known as:  CELLCEPT            Indwelling Lines/Drains at time of discharge:   Lines/Drains/Airways     Drain                 Hemodialysis AV Fistula Left forearm -- days                Time spent on the discharge of patient: 39 minutes  Patient was seen and examined on the date of discharge and determined to be suitable for discharge.         Perri Gruber NP  Department of Hospital Medicine  [facility]  Spectra:  [phone]  Pager: [phone]

## 2019-08-25 NOTE — PLAN OF CARE
Problem: Adult Inpatient Plan of Care  Goal: Plan of Care Review  Patient remains free from falls and injuries through out shift. Patient AAO and VSS.  Patient denies chest pain and SOB. Special contact precautions  maintained as ordered. Patient receiving PO Vanc for C.Diff. IV rocephin given as ordered. Plan of care reviewed with patient. Patient verbalizes understanding of plan.  Will continue to monitor.

## 2019-08-25 NOTE — NURSING
Patient discharged home. Patient remains free from falls and injuries through out shift. Patient telemetry removed. IV removed with catheter tip in place. Prescriptions delivered to patient by pharmacy. Patient VSS. Discharge instructions, medication list reviewed, and patient verbalizes understanding. Patient states he is waiting on his ride and will not need a wheelchair. Will continue to monitor.

## 2019-08-25 NOTE — ASSESSMENT & PLAN NOTE
- subtherapeutic, might not gotten at OSH  - [facility] following no change in prograf, MMF on hold while taking abx   - trending up goal 4-5

## 2019-08-25 NOTE — PROGRESS NOTES
[facility]  Kidney Transplant  Progress Note      Reason for Follow-up: Reassessment of Kidney Transplant - 2016  (#1) recipient and management of immunosuppression.    ORGAN: LEFT KIDNEY    Donor Type:  - Brain Death      Subjective:   History of Present Illness:  Mr. Burkett is a 68 y.o. 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 Mr. Burkett had  ESRD HD due to HTN  and congenital absent left kidney who was on dialysis since 6/16/10 until receiving [facility] increased risk DDRT (pumped kidney, Campath induction, KDPI 36%, WIT 30 minutes, CIT 14 hours and 14 minutes, donor RPR positive thus patient completed PCN x3, CMV D+/R+) on 16. His baseline creatinine is 1.4-1.6 mg/dL. He takes mycophenolate mofetil, prednisone and tacrolimus  For Immunosuppression.    Post Transplant Course: As per Dr Mcclain  - Pt had DIVINE with suspected rejection on 17.  Kid bx  revealed AVR  Had thymo x 5-7 doses. DSA neg.  -  - He was rebiopsied on 17 which showed acute tubular injury and vacuolization, with + myoglobulin and negative heme.  - Recurrent UTI  Mr Alin Burkett is admitted to Hospital Medicine secondary to an ablation that he will have by Dr Mcmanus.his Cr is 1.5 mg/dL. He is having his ablation today. Renal function is stable. Encourage hydration. KTM cx for Kidney allograft assessment and IS management.    Mr. Burkett is a 69 y.o. year old male who is status post Kidney Transplant - 2016  (#1).    His maintenance immunosuppression consists of:   Immunosuppressants (From admission, onward)    Start     Stop Route Frequency Ordered    19 1800  tacrolimus capsule 2 mg      -- Oral Daily 19 0009    19 000  tacrolimus capsule 3 mg      -- Oral Daily 19 000          Interval History:  Patient evaluated bedside, stable vital signs, night uneventful.    Past Medical, Surgical, Family, and  "Social History:   Unchanged from H&P.    Scheduled Meds:   aspirin  81 mg Oral Daily    calcitRIOL  0.25 mcg Oral Daily    cefTRIAXone (ROCEPHIN) IVPB  1 g Intravenous Q24H    famotidine  20 mg Oral QHS    ketoconazole  100 mg Oral Daily    levothyroxine  100 mcg Oral Daily    magnesium oxide  400 mg Oral Daily    metoprolol tartrate  12.5 mg Oral BID    predniSONE  5 mg Oral Daily    sodium bicarbonate  1,300 mg Oral BID    tacrolimus  2 mg Oral Daily    tacrolimus  3 mg Oral Daily    vancomycin  125 mg Oral Q6H     Continuous Infusions:  PRN Meds:acetaminophen, HYDROcodone-acetaminophen, HYDROmorphone, lidocaine HCL 20 mg/ml (2%), ondansetron, ondansetron, polyethylene glycol, promethazine, sodium chloride 0.9%    Intake/Output - Last 3 Shifts       08/23 0700 - 08/24 0659 08/24 0700 - 08/25 0659 08/25 0700 - 08/26 0659    P.O. 480 1080 240    I.V. (mL/kg)       Total Intake(mL/kg) 480 (7.3) 1080 (16.3) 240 (3.6)    Urine (mL/kg/hr)  725 (0.5) 900 (4.1)    Blood  0     Total Output  725 900    Net +480 +355 -660           Urine Occurrence 1 x               Objective:     Vital Signs (Most Recent):  Temp: 98.6 °F (37 °C) (08/25/19 0811)  Pulse: 74 (08/25/19 0811)  Resp: 16 (08/25/19 0811)  BP: (!) 146/94 (08/25/19 0811)  SpO2: 99 % (08/25/19 0811) Vital Signs (24h Range):  Temp:  [97.8 °F (36.6 °C)-98.7 °F (37.1 °C)] 98.6 °F (37 °C)  Pulse:  [57-75] 74  Resp:  [16-18] 16  SpO2:  [95 %-99 %] 99 %  BP: (127-146)/(81-94) 146/94     Weight: 66.1 kg (145 lb 11.6 oz)  Height: 5' 11" (180.3 cm)  Body mass index is 20.32 kg/m².    Physical Exam   Constitutional: He is oriented to person, place, and time. He appears well-developed and well-nourished. No distress.   HENT:   Head: Normocephalic and atraumatic.   Eyes: Pupils are equal, round, and reactive to light. Conjunctivae are normal.   Neck: Trachea normal. Neck supple. No JVD present.   Cardiovascular: Normal rate, regular rhythm, S1 normal, S2 normal, " normal heart sounds and normal pulses. Exam reveals no gallop and no friction rub.   No murmur heard.  Pulmonary/Chest: Effort normal and breath sounds normal.   Abdominal: Soft. Bowel sounds are normal.   Musculoskeletal: Normal range of motion.   Neurological: He is alert and oriented to person, place, and time.   Skin: Skin is warm and dry.   Psychiatric: He has a normal mood and affect. His behavior is normal. Thought content normal.   Nursing note and vitals reviewed.      Laboratory:  CBC:   Recent Labs   Lab 08/19/19  2305 08/21/19  0425   WBC 8.89 7.22   RBC 4.31* 4.02*   HGB 12.8* 12.1*   HCT 40.8 37.8*    150   MCV 95 94   MCH 29.7 30.1   MCHC 31.4* 32.0     CMP:   Recent Labs   Lab 08/23/19  0808 08/24/19  0351 08/25/19  0452   GLU 78 82 79   CALCIUM 9.5 9.1 8.8   ALBUMIN 3.4* 2.7* 2.7*   PROT 7.3 6.2 6.1    142 142   K 4.1 3.9 3.7   CO2 21* 24 24    110 110   BUN 16 19 15   CREATININE 1.4 1.6* 1.4   ALKPHOS 63 53* 48*   ALT 21 15 14   AST 32 19 16     Labs within the past 24 hours have been reviewed.      Assessment/Plan:     Urinary tract infection without hematuria  - Urine culture Klebsiella pneumoniae sensitivities pending; agree with ceftriaxone deescalate once sensitivities are back  - Hold Cellcept while patient is on antibiotics restart when antibiotics have been completed.     C.diff colitis  - agree with oral vanc  - follow carefully     Stage 3 chronic kidney disease  CKD stage G3a/A1 stable sCr at baseline 1.5 mg/dL.  Stable renal function  - sCr improving, down to 1.4   - UA with greater than 100 K WBC urine culture in progress empiric antibiotics started by primary team Ceftriaxone  - Please wait for full sensitivity; for now continue empiric treatment as patient's symptoms and sCr improving  - Acid-Base: Continue home dose of sodium Bicarb  - Electrolytes: please get RFP and Mag daily. Stable electrolytes  - Maintain MAP > 65  - Hg >7 if Hg < 7 please transfuse.   -  Continue to monitor strict I/O's, daily weights, renally dose medications, and avoid nephrotoxic agents           -donor kidney transplant  Induced w Campath 30 mg IV intraoperatively & SoluMedrol 875 mg total over 3 days.   Renal allograft biopsy 17 (DIVINE): 21 glomeruli, none globally sclerosed, <5% interstitial fibrosis, no ACR, c4d negative, AVR CCT Type 2 (V1 lesion); plan THYMO   Renal allograft biopsy 17 (follow-up after rejection treatment): 27 glomeruli, no AVR, no ACR, C4d negative, no microcirculation changes, 5-10% interstitial fibrosis; ?cast --> further studies pending        Long-term use of immunosuppressant medication  - induction with Campath  and solumderol  - Keto 100 mg daily  - continue Prograf 3/2 mg, will need to monitor for toxicities  - Tac Level in am, goal [id number] trough level 4-6  - Holding Cellcept in setting of recurrent UTI  - Continue prednisone 5 mg daily           Essential hypertension  - Acceptable BP, will continue to monitor. Goal for BP is <130 mmHg SBP and BDP <80 mmHg.  - As per        Discharge Planning:  Per primary    Portillo Dhillon MD  Kidney Transplant  [facility]

## 2019-08-25 NOTE — ASSESSMENT & PLAN NOTE
- urinalysis grossly positive  - immunosupressed, KTM managing, started on CTX x3 days recurrent Klebsiella as also what he had earlier this year.  Urology f/u h/o urethral stensoses which probably preventing complete bladder emptying and retention.

## 2019-08-25 NOTE — SUBJECTIVE & OBJECTIVE
Subjective:   History of Present Illness:  Mr. Burkett is a 68 y.o. 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 Mr. Burkett had  ESRD HD due to HTN  and congenital absent left kidney who was on dialysis since 6/16/10 until receiving [facility] increased risk DDRT (pumped kidney, Campath induction, KDPI 36%, WIT 30 minutes, CIT 14 hours and 14 minutes, donor RPR positive thus patient completed PCN x3, CMV D+/R+) on 16. His baseline creatinine is 1.4-1.6 mg/dL. He takes mycophenolate mofetil, prednisone and tacrolimus  For Immunosuppression.    Post Transplant Course: As per Dr Mcclain  - Pt had DIVINE with suspected rejection on 17.  Kid bx  revealed AVR  Had thymo x 5-7 doses. DSA neg.  -  - He was rebiopsied on 17 which showed acute tubular injury and vacuolization, with + myoglobulin and negative heme.  - Recurrent UTI  Mr Alin Burkett is admitted to Hospital Medicine secondary to an ablation that he will have by Dr Mcmanus.his Cr is 1.5 mg/dL. He is having his ablation today. Renal function is stable. Encourage hydration. KTM cx for Kidney allograft assessment and IS management.    Mr. Burkett is a 69 y.o. year old male who is status post Kidney Transplant - 2016  (#1).    His maintenance immunosuppression consists of:   Immunosuppressants (From admission, onward)    Start     Stop Route Frequency Ordered    19 1800  tacrolimus capsule 2 mg      -- Oral Daily 19 0009    19 0009  tacrolimus capsule 3 mg      -- Oral Daily 19 0009          Interval History:  Patient evaluated bedside, stable vital signs, night uneventful.    Past Medical, Surgical, Family, and Social History:   Unchanged from H&P.    Scheduled Meds:   aspirin  81 mg Oral Daily    calcitRIOL  0.25 mcg Oral Daily    cefTRIAXone (ROCEPHIN) IVPB  1 g Intravenous Q24H    famotidine  20 mg Oral QHS    ketoconazole  100 mg Oral Daily     "levothyroxine  100 mcg Oral Daily    magnesium oxide  400 mg Oral Daily    metoprolol tartrate  12.5 mg Oral BID    predniSONE  5 mg Oral Daily    sodium bicarbonate  1,300 mg Oral BID    tacrolimus  2 mg Oral Daily    tacrolimus  3 mg Oral Daily    vancomycin  125 mg Oral Q6H     Continuous Infusions:  PRN Meds:acetaminophen, HYDROcodone-acetaminophen, HYDROmorphone, lidocaine HCL 20 mg/ml (2%), ondansetron, ondansetron, polyethylene glycol, promethazine, sodium chloride 0.9%    Intake/Output - Last 3 Shifts       08/23 0700 - 08/24 0659 08/24 0700 - 08/25 0659 08/25 0700 - 08/26 0659    P.O. 480 1080 240    I.V. (mL/kg)       Total Intake(mL/kg) 480 (7.3) 1080 (16.3) 240 (3.6)    Urine (mL/kg/hr)  725 (0.5) 900 (4.1)    Blood  0     Total Output  725 900    Net +480 +355 -660           Urine Occurrence 1 x               Objective:     Vital Signs (Most Recent):  Temp: 98.6 °F (37 °C) (08/25/19 0811)  Pulse: 74 (08/25/19 0811)  Resp: 16 (08/25/19 0811)  BP: (!) 146/94 (08/25/19 0811)  SpO2: 99 % (08/25/19 0811) Vital Signs (24h Range):  Temp:  [97.8 °F (36.6 °C)-98.7 °F (37.1 °C)] 98.6 °F (37 °C)  Pulse:  [57-75] 74  Resp:  [16-18] 16  SpO2:  [95 %-99 %] 99 %  BP: (127-146)/(81-94) 146/94     Weight: 66.1 kg (145 lb 11.6 oz)  Height: 5' 11" (180.3 cm)  Body mass index is 20.32 kg/m².    Physical Exam   Constitutional: He is oriented to person, place, and time. He appears well-developed and well-nourished. No distress.   HENT:   Head: Normocephalic and atraumatic.   Eyes: Pupils are equal, round, and reactive to light. Conjunctivae are normal.   Neck: Trachea normal. Neck supple. No JVD present.   Cardiovascular: Normal rate, regular rhythm, S1 normal, S2 normal, normal heart sounds and normal pulses. Exam reveals no gallop and no friction rub.   No murmur heard.  Pulmonary/Chest: Effort normal and breath sounds normal.   Abdominal: Soft. Bowel sounds are normal.   Musculoskeletal: Normal range of motion. "   Neurological: He is alert and oriented to person, place, and time.   Skin: Skin is warm and dry.   Psychiatric: He has a normal mood and affect. His behavior is normal. Thought content normal.   Nursing note and vitals reviewed.      Laboratory:  CBC:   Recent Labs   Lab 08/19/19  2305 08/21/19  0425   WBC 8.89 7.22   RBC 4.31* 4.02*   HGB 12.8* 12.1*   HCT 40.8 37.8*    150   MCV 95 94   MCH 29.7 30.1   MCHC 31.4* 32.0     CMP:   Recent Labs   Lab 08/23/19  0808 08/24/19  0351 08/25/19  0452   GLU 78 82 79   CALCIUM 9.5 9.1 8.8   ALBUMIN 3.4* 2.7* 2.7*   PROT 7.3 6.2 6.1    142 142   K 4.1 3.9 3.7   CO2 21* 24 24    110 110   BUN 16 19 15   CREATININE 1.4 1.6* 1.4   ALKPHOS 63 53* 48*   ALT 21 15 14   AST 32 19 16     Labs within the past 24 hours have been reviewed.

## 2019-08-26 LAB
E COLI SXT1 STL QL IA: NEGATIVE
E COLI SXT2 STL QL IA: NEGATIVE
O+P STL TRI STN: NORMAL

## 2019-08-26 NOTE — PLAN OF CARE
08/26/19 0750   Final Note   Assessment Type Final Discharge Note   Anticipated Discharge Disposition Home   Hospital Follow Up  Appt(s) scheduled? No  (EP to schedule follow up)

## 2019-08-27 LAB — BACTERIA STL CULT: NORMAL

## 2019-08-28 LAB
AORTIC ROOT ANNULUS: 3.68 CM
AORTIC VALVE CUSP SEPERATION: 1.84 CM
AV INDEX (PROSTH): 1.03
AV MEAN GRADIENT: 4 MMHG
AV PEAK GRADIENT: 5 MMHG
AV VALVE AREA: 3.26 CM2
AV VELOCITY RATIO: 112.53
BSA FOR ECHO PROCEDURE: 1.85 M2
CV ECHO LV RWT: 1.03 CM
DOP CALC AO PEAK VEL: 1.16 M/S
DOP CALC AO VTI: 24.74 CM
DOP CALC LVOT AREA: 3.2 CM2
DOP CALC LVOT DIAMETER: 2.01 CM
DOP CALC LVOT PEAK VEL: 130.53 M/S
DOP CALC LVOT STROKE VOLUME: 80.75 CM3
DOP CALCLVOT PEAK VEL VTI: 25.46 CM
E WAVE DECELERATION TIME: 149.18 MSEC
E/A RATIO: 1.24
E/E' RATIO: 11.33 M/S
ECHO LV POSTERIOR WALL: 1.49 CM (ref 0.6–1.1)
FRACTIONAL SHORTENING: 50 % (ref 28–44)
INTERVENTRICULAR SEPTUM: 1.41 CM (ref 0.6–1.1)
LEFT ATRIUM SIZE: 3.6 CM
LEFT INTERNAL DIMENSION IN SYSTOLE: 1.43 CM (ref 2.1–4)
LEFT VENTRICLE DIASTOLIC VOLUME INDEX: 14.41 ML/M2
LEFT VENTRICLE DIASTOLIC VOLUME: 26.9 ML
LEFT VENTRICLE MASS INDEX: 76 G/M2
LEFT VENTRICLE SYSTOLIC VOLUME INDEX: 4.9 ML/M2
LEFT VENTRICLE SYSTOLIC VOLUME: 9.2 ML
LEFT VENTRICULAR INTERNAL DIMENSION IN DIASTOLE: 2.88 CM (ref 3.5–6)
LEFT VENTRICULAR MASS: 141.38 G
LV LATERAL E/E' RATIO: 9.71 M/S
LV SEPTAL E/E' RATIO: 13.6 M/S
MV PEAK A VEL: 0.55 M/S
MV PEAK E VEL: 0.68 M/S
PISA TR MAX VEL: 2.27 M/S
PV PEAK VELOCITY: 91.67 CM/S
RIGHT VENTRICULAR END-DIASTOLIC DIMENSION: 364 CM
TDI LATERAL: 0.07 M/S
TDI SEPTAL: 0.05 M/S
TDI: 0.06 M/S
TR MAX PG: 21 MMHG

## 2019-09-20 ENCOUNTER — TELEPHONE (OUTPATIENT)
Dept: TRANSPLANT | Facility: CLINIC | Age: 69
End: 2019-09-20

## 2019-09-20 ENCOUNTER — PATIENT OUTREACH (OUTPATIENT)
Dept: ADMINISTRATIVE | Facility: OTHER | Age: 69
End: 2019-09-20

## 2019-09-20 RX ORDER — MYCOPHENOLATE MOFETIL 250 MG/1
750 CAPSULE ORAL 2 TIMES DAILY
Qty: 180 CAPSULE | Refills: 11 | Status: ON HOLD | OUTPATIENT
Start: 2019-09-20 | End: 2020-01-01 | Stop reason: SDUPTHER

## 2019-09-20 NOTE — TELEPHONE ENCOUNTER
----- Message from Chasidy Mcclain MD sent at 9/20/2019  3:07 PM CDT -----  He can restart it. Thanks!    ----- Message -----  From: Lisa Olea RN  Sent: 9/20/2019   2:48 PM  To: MD Steve Mach[address] Dr.,     Mr. Burkett called to find out when can he restart Cellcept?    Patient reports it was placed on HOLD after his recent admission 8/19/19 with heart issues & UTI. Patient was on levaquin & report therapy is complete.   He does have a f/u appt to see Dr. Mann on 9/24/19 & he saw his Cardiologist this week.

## 2019-09-20 NOTE — TELEPHONE ENCOUNTER
----- Message from Chandni Paredes sent at 9/20/2019  9:02 AM CDT -----  Type:  Needs Medical Advice    Who Called: Pt     Would the patient rather a call back or a response via [facility]? Call back    Best Call Back Number: [phone]    Additional Information: Pt wants to know when will he resume Txp medication

## 2019-09-20 NOTE — TELEPHONE ENCOUNTER
Contacted patient with Dr. Mcclain's recommendations to restart Cellcept as previously ordered, 750mg bid. Patient will have labs drawn 9/30/19. Patient verbalized understanding.

## 2019-09-20 NOTE — TELEPHONE ENCOUNTER
----- Message from Cecy Edmondson sent at 9/20/2019 12:19 PM CDT -----  Contact: Alin  Pt stated he needed to speak with someone in the office about his meds. Pt stated this message is urgent. Pt contact number is [phone].

## 2019-09-20 NOTE — TELEPHONE ENCOUNTER
Returned patient's call. Patient called to ask when can he restart Cellcept. Patient reports it was placed on HOLD after his recent admission with heart issues & UTI. Patient was on levaquin & report therapy is complete.   Coordinator informed patient that she will review with Transplant nephrologist & follow-up with her recommendations. Patient verbalized understanding.

## 2019-09-24 ENCOUNTER — OFFICE VISIT (OUTPATIENT)
Dept: UROLOGY | Facility: CLINIC | Age: 69
End: 2019-09-24
Payer: MEDICARE

## 2019-09-24 VITALS
DIASTOLIC BLOOD PRESSURE: 89 MMHG | BODY MASS INDEX: 20.99 KG/M2 | HEART RATE: 57 BPM | HEIGHT: 71 IN | SYSTOLIC BLOOD PRESSURE: 134 MMHG | WEIGHT: 149.94 LBS

## 2019-09-24 DIAGNOSIS — N32.3 BLADDER DIVERTICULUM: ICD-10-CM

## 2019-09-24 DIAGNOSIS — N30.00 ACUTE CYSTITIS WITHOUT HEMATURIA: Primary | ICD-10-CM

## 2019-09-24 DIAGNOSIS — N99.111 POSTPROCEDURAL BULBOUS URETHRAL STRICTURE: ICD-10-CM

## 2019-09-24 DIAGNOSIS — N52.9 ED (ERECTILE DYSFUNCTION) OF ORGANIC ORIGIN: ICD-10-CM

## 2019-09-24 PROCEDURE — 3288F PR FALLS RISK ASSESSMENT DOCUMENTED: ICD-10-PCS | Mod: S$GLB,,, | Performed by: UROLOGY

## 2019-09-24 PROCEDURE — 99215 PR OFFICE/OUTPT VISIT, EST, LEVL V, 40-54 MIN: ICD-10-PCS | Mod: S$GLB,,, | Performed by: UROLOGY

## 2019-09-24 PROCEDURE — 99999 PR PBB SHADOW E&M-EST. PATIENT-LVL IV: CPT | Mod: PBBFAC,,, | Performed by: UROLOGY

## 2019-09-24 PROCEDURE — 3079F DIAST BP 80-89 MM HG: CPT | Mod: S$GLB,,, | Performed by: UROLOGY

## 2019-09-24 PROCEDURE — 99215 OFFICE O/P EST HI 40 MIN: CPT | Mod: S$GLB,,, | Performed by: UROLOGY

## 2019-09-24 PROCEDURE — 1100F PR PT FALLS ASSESS DOC 2+ FALLS/FALL W/INJURY/YR: ICD-10-PCS | Mod: S$GLB,,, | Performed by: UROLOGY

## 2019-09-24 PROCEDURE — 1100F PTFALLS ASSESS-DOCD GE2>/YR: CPT | Mod: S$GLB,,, | Performed by: UROLOGY

## 2019-09-24 PROCEDURE — 99999 PR PBB SHADOW E&M-EST. PATIENT-LVL IV: ICD-10-PCS | Mod: PBBFAC,,, | Performed by: UROLOGY

## 2019-09-24 PROCEDURE — 3288F FALL RISK ASSESSMENT DOCD: CPT | Mod: S$GLB,,, | Performed by: UROLOGY

## 2019-09-24 PROCEDURE — 3075F SYST BP GE 130 - 139MM HG: CPT | Mod: S$GLB,,, | Performed by: UROLOGY

## 2019-09-24 PROCEDURE — 3079F PR MOST RECENT DIASTOLIC BLOOD PRESSURE 80-89 MM HG: ICD-10-PCS | Mod: S$GLB,,, | Performed by: UROLOGY

## 2019-09-24 PROCEDURE — 3075F PR MOST RECENT SYSTOLIC BLOOD PRESS GE 130-139MM HG: ICD-10-PCS | Mod: S$GLB,,, | Performed by: UROLOGY

## 2019-09-24 RX ORDER — DOXYCYCLINE 100 MG/1
100 CAPSULE ORAL 2 TIMES DAILY
Qty: 6 CAPSULE | Refills: 0 | Status: SHIPPED | OUTPATIENT
Start: 2019-09-24 | End: 2019-09-27

## 2019-09-24 RX ORDER — MYCOPHENOLATE MOFETIL 250 MG/1
CAPSULE ORAL
COMMUNITY
Start: 2019-09-20 | End: 2020-01-01

## 2019-09-24 NOTE — PROGRESS NOTES
CHIEF COMPLAINT:    Mr. Burkett is a 69 y.o. male presenting for fournier catheter removal.    PRESENTING ILLNESS:    Alin Burkett is a 69 y.o. male with a PMH of urethral stricture, s/p transplant kidney, ED.  C/o weak urine flow.  C/o ED.  Not responded well to viagra in the past.    He had the following procedure on 12/20/18 with Dr. Mann:  Procedure(s) Performed:   1.  Cystoscopy with DVIU  2.  Urethral dilation   Findings:   - tight stricture in pendulous urethra about 3 cm in length, dilated from 10 - 22 Fr with Heymen dilators. Urethrotome used to open strictured area.  - Large mouth bladder diverticula with multiple outpouchings    He has learned how to do CIC.  However, he noticed that following CIC, he experienced some purulent drainage coming out of his urethra.  Thus he stopped doing CIC.   He reports some discomfort at the tip of his penis yesterday.  He has a history of recurrent UTIs.    His most recent urine cultures are proteus, enterococcus, and klebsiella.  His only symptom is small degree of burning before he urinates whenever his urine cultures are positive.    s/p cysto and urethral dilation for anterior urethral stricture on 12/17.  Bladder diverticulum was also seen on cysto.     He has a history of self-cath with 16 Fr catheter occasionally but has not does this in over 3 months.  His stream is good most of the time, but he reports occasional trickling. He also occasionally passes some debris that may be stones.     He had a kidney transplant in 11/ 2016       REVIEW OF SYSTEMS:    Constitutional: Negative for fever and chills.   HENT: Negative for hearing loss.   Eyes: Negative for visual disturbance.   Respiratory: Negative for shortness of breath.   Cardiovascular: Negative for chest pain.   Gastrointestinal: Negative for vomiting, and constipation.   Genitourinary: See HPI  Neurological: Negative for dizziness.   Hematological: Does not bruise/bleed easily.   Psychiatric/Behavioral:  Negative for confusion.       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  Renal allograft biopsy 17 (DIVINE): 21 glomeruli, none globally sclerosed, <5% interstitial fibrosis, no ACR, c4d negative, AVR CCT Type 2 (V1 lesion); plan THYMO     Dissecting aortic aneurysm (any part), abdominal     Diverticulosis     Encounter for blood transfusion     ESRD (end stage renal disease) 2010    H/O urethral stricture 2018    H/O: urethral stricture     History of AAA (abdominal aortic aneurysm) repair     History of urethral stricture 2018    Hypertension     Hypokalemia     Hypothyroidism 1/10/2014    Inguinal hernia bilateral, non-recurrent     Kidney stones     Organ transplant candidate 2013    Plantar warts 1/10/2014    Recurrent UTI 2017    S/P kidney transplant     Secondary hyperparathyroidism, renal     Thyroid disease        Past Surgical History:   Procedure Laterality Date    ABDOMINAL SURGERY      exploratory lapatomy x 2    ABLATION N/A 2019    Procedure: ABLATION, SVT;  Surgeon: Emerson Mcmanus MD;  Location: [facility] EP LAB;  Service: Cardiology;  Laterality: N/A;  SVT, RFA, CARTO, anes, GP, 323    AORTA - SUPERIOR MESENTERIC ARTERY BYPASS GRAFT      BLADDER NECK RECONSTRUCTION      BLADDER SURGERY      COLONOSCOPY  10/10/2013    Dr. Gutierrez, repeat in 5 years    CYSTOSCOPY       CYSTOSCOPY N/A 12/19/2018    Procedure: CYSTOSCOPY;  Surgeon: Dewey Mann MD;  Location: [facility] OR [address] [place] [address];  Service: Urology;  Laterality: N/A;  45 min    CYSTOURETHROSCOPY WITH DIRECT VISION INTERNAL URETHROTOMY N/A 12/19/2018    Procedure: CYSTOSCOPY, WITH DIRECT VISION INTERNAL URETHROTOMY;  Surgeon: Dewey Mann MD;  [facility];  Service: Urology;  Laterality: N/A;    DILATION OF URETHRA N/A 12/19/2018    Procedure: DILATION, URETHRA;  Surgeon: Dewey Mann MD;  Location: [facility] OR [address] [place] [address];  Service: Urology;  Laterality: N/A;    GASTROJEJUNOSTOMY      HEMORRHOID SURGERY      HERNIA REPAIR      KIDNEY TRANSPLANT      LEFT HEART CATHETERIZATION Left 8/20/2019    Procedure: Left heart cath;  Surgeon: Javan Oscar MD;  Location: [facility] CATH/EP LAB;  Service: Cardiology;  Laterality: Left;    LITHOTRIPSY      PERCUTANEOUS NEPHROLITHOTRIPSY      right  ESWL  10/31/12    right ESWL  6/26/12       Family History   Problem Relation Age of Onset    Diabetes Mother     Alzheimer's disease Mother     Alcohol abuse Father     HIV Brother     Stroke Maternal Aunt     Kidney disease Paternal Uncle     Kidney disease Cousin     No Known Problems Sister     No Known Problems Daughter     No Known Problems Sister     No Known Problems Brother     No Known Problems Brother     Cancer Brother         thyroid cancer    Melanoma Neg Hx     Psoriasis Neg Hx     Lupus Neg Hx     Eczema Neg Hx     Colon cancer Neg Hx     Colon polyps Neg Hx     Crohn's disease Neg Hx     Ulcerative colitis Neg Hx     Celiac disease Neg Hx        Social History     Socioeconomic History    Marital status: Significant Other     Spouse name: Not on file    Number of children: Not on file    Years of education: Not on file    Highest education level: Not on file   Occupational History     Employer: Disabled   Social Needs    Financial resource strain: Not on file    Food insecurity:     Worry:  Not on file     Inability: Not on file    Transportation needs:     Medical: Not on file     Non-medical: Not on file   Tobacco Use    Smoking status: Former Smoker     Years: 40.00     Types: Cigarettes     Last attempt to quit: 2010     Years since quittin.2    Smokeless tobacco: Never Used   Substance and Sexual Activity    Alcohol use: No     Comment: stopped ETOH in     Drug use: No     Comment: THC in youth    Sexual activity: Yes     Partners: Female     Birth control/protection: None   Lifestyle    Physical activity:     Days per week: Not on file     Minutes per session: Not on file    Stress: Not on file   Relationships    Social connections:     Talks on phone: Not on file     Gets together: Not on file     Attends Scientologist service: Not on file     Active member of club or organization: Not on file     Attends meetings of clubs or organizations: Not on file     Relationship status: Not on file   Other Topics Concern    Not on file   Social History Narrative    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    Medications:    Current Outpatient Medications:     aspirin (ECOTRIN) 81 MG EC tablet, Take 1 tablet (81 mg total) by mouth once daily., Disp: , Rfl: 0    calcitRIOL (ROCALTROL) 0.25 MCG Cap, Take 1 capsule (0.25 mcg total) by mouth once daily., Disp: 30 capsule, Rfl: 11    famotidine (PEPCID) 20 MG tablet, Take 1 tablet (20 mg total) by mouth every evening., Disp: 90 tablet, Rfl: 3    FLUAD 5082-4835, 65 YR UP,,PF, 45 mcg (15 mcg x 3)/0.5 mL Syrg, PHARMACIST ADMINISTERED IMMUNIZATION ADMINISTERED AT TIME OF DISPENSING, Disp: , Rfl: 0    ketoconazole (NIZORAL) 200 mg Tab, Take 0.5 tablets (100 mg total) by mouth once daily., Disp: 45 tablet, Rfl: 3    levoFLOXacin (LEVAQUIN) 750 MG tablet, Take 1 tablet (750 mg total) by mouth every other day., Disp: 5 tablet, Rfl: 0    levothyroxine (SYNTHROID) 100 MCG tablet, Take 1  tablet (100 mcg total) by mouth once daily., Disp: 90 tablet, Rfl: 3    magnesium oxide (MAG-OX) 400 mg (241.3 mg magnesium) tablet, Take 1 tablet (400 mg total) by mouth once daily., Disp: 90 tablet, Rfl: 3    metoprolol tartrate (LOPRESSOR) 25 MG tablet, Take 0.5 tablets (12.5 mg total) by mouth 2 (two) times daily., Disp: 90 tablet, Rfl: 3    multivitamin (ONE DAILY MULTIVITAMIN) per tablet, Take 1 tablet by mouth once daily., Disp: , Rfl:     mycophenolate (CELLCEPT) 250 mg Cap, Take 3 capsules (750 mg total) by mouth 2 (two) times daily. Z94.0/Kidney transplant on 11/26/16, Disp: 180 capsule, Rfl: 11    mycophenolate (CELLCEPT) 250 mg Cap, , Disp: , Rfl:     predniSONE (DELTASONE) 5 MG tablet, Take 1 tablet (5 mg total) by mouth once daily. Z94.0/Kidney transplant on 11/26/2016, Disp: 90 tablet, Rfl: 3    sodium bicarbonate 650 MG tablet, Take 1 tablet (650 mg total) by mouth 2 (two) times daily., Disp: 540 tablet, Rfl: 3    tacrolimus (PROGRAF) 1 MG Cap, Take 3 capsules (3 mg total) by mouth every morning AND 2 capsules (2 mg total) every evening. Z94.0/Kidney Transplant on 11/26/16., Disp: 450 capsule, Rfl: 3    doxycycline (VIBRAMYCIN) 100 MG Cap, Take 1 capsule (100 mg total) by mouth 2 (two) times daily. for 6 doses, Disp: 6 capsule, Rfl: 0    pep injection, Inject 0.25 ml as directed.  May increase by 0.05 ml such as 0.30, 0.35 ml etc. Up to 1.00 ml Until adequate result is achieved.  For compounding pharmacy use:  Add PAPAVERINE 30 mcg Add PHENTOLAMINE 10 mg Add ALPROSTADIL 100 mcg, Disp: 1 vial, Rfl: 2    PHYSICAL EXAMINATION:    Constitutional: He appears well-developed and well-nourished.  He is in no apparent distress.    Eyes: No scleral icterus noted bilaterally. No discharge bilaterally.    Nose: No rhinorrhea    Cardiovascular: Normal rate.  No pitting edema noted in lower extremities bilaterally    Pulmonary/Chest: Effort normal. No respiratory distress.     Abdominal:  He exhibits no  distension.  There is no CVA tenderness.     Lymphadenopathy:        Right: No supraclavicular adenopathy present.        Left: No supraclavicular adenopathy present.     Neurological: He is alert and oriented to person, place, and time.     Skin: Skin is warm and dry.     Psych: Cooperative with normal affect.    Genitourinary: The penis is circumcised.     Physical Exam      LABS:    Lab Results   Component Value Date    PSA 0.41 10/18/2016    PSA 0.32 10/20/2015    PSA 0.45 11/04/2014       IMPRESSION:    Acute cystitis without hematuria  -     Urine culture; Future; Expected date: 09/24/2019  -     doxycycline (VIBRAMYCIN) 100 MG Cap; Take 1 capsule (100 mg total) by mouth 2 (two) times daily. for 6 doses  Dispense: 6 capsule; Refill: 0  -     Urine culture; Future; Expected date: 09/30/2019    Postprocedural bulbous urethral stricture  -     FL Urethrogram Retrograde (xpd); Future; Expected date: 09/24/2019    ED (erectile dysfunction) of organic origin  -     pep injection; Inject 0.25 ml as directed.   May increase by 0.05 ml such as 0.30, 0.35 ml etc. Up to 1.00 ml Until adequate result is achieved.    For compounding pharmacy use:   Add PAPAVERINE 30 mcg  Add PHENTOLAMINE 10 mg  Add ALPROSTADIL 100 mcg  Dispense: 1 vial; Refill: 2    Bladder diverticulum        PLAN:    For his LUTS, I suspect recurrent urethral stricture.  It was long and tight on previous exam.  Most likely he will require DVIU.  Discussed briefly regarding urethroplasty.  He is interested in such operation.  Thus will evaluate him with RUG first and see what to do.  Will require transplant team consult regarding his healing condition.  Collect urine culture next Monday at [facility].  Take doxycycline 1 dose after RUG.    For his ED, he is interested in trying PEP injection.  He will bring the Trimix from [facility] pharmacy in a ice box.  I spent 40 minutes with the patient of which more than half was spent in direct consultation with the  patient in regards to our treatment and plan.    Follow up RUG.

## 2019-09-30 ENCOUNTER — LAB VISIT (OUTPATIENT)
Dept: LAB | Facility: HOSPITAL | Age: 69
End: 2019-09-30
Attending: INTERNAL MEDICINE
Payer: MEDICARE

## 2019-09-30 DIAGNOSIS — Z94.0 KIDNEY REPLACED BY TRANSPLANT: ICD-10-CM

## 2019-09-30 LAB
ALBUMIN SERPL BCP-MCNC: 3.9 G/DL (ref 3.5–5.2)
ANION GAP SERPL CALC-SCNC: 10 MMOL/L (ref 8–16)
BASOPHILS # BLD AUTO: 0.03 K/UL (ref 0–0.2)
BASOPHILS NFR BLD: 0.4 % (ref 0–1.9)
BUN SERPL-MCNC: 28 MG/DL (ref 8–23)
CALCIUM SERPL-MCNC: 9.8 MG/DL (ref 8.7–10.5)
CHLORIDE SERPL-SCNC: 108 MMOL/L (ref 95–110)
CO2 SERPL-SCNC: 23 MMOL/L (ref 23–29)
CREAT SERPL-MCNC: 1.6 MG/DL (ref 0.5–1.4)
DIFFERENTIAL METHOD: ABNORMAL
EOSINOPHIL # BLD AUTO: 0.1 K/UL (ref 0–0.5)
EOSINOPHIL NFR BLD: 1.6 % (ref 0–8)
ERYTHROCYTE [DISTWIDTH] IN BLOOD BY AUTOMATED COUNT: 14.8 % (ref 11.5–14.5)
EST. GFR  (AFRICAN AMERICAN): 50.4 ML/MIN/1.73 M^2
EST. GFR  (NON AFRICAN AMERICAN): 43.6 ML/MIN/1.73 M^2
GLUCOSE SERPL-MCNC: 73 MG/DL (ref 70–110)
HCT VFR BLD AUTO: 43.1 % (ref 40–54)
HGB BLD-MCNC: 13.5 G/DL (ref 14–18)
IMM GRANULOCYTES # BLD AUTO: 0.05 K/UL (ref 0–0.04)
IMM GRANULOCYTES NFR BLD AUTO: 0.7 % (ref 0–0.5)
LYMPHOCYTES # BLD AUTO: 1.2 K/UL (ref 1–4.8)
LYMPHOCYTES NFR BLD: 17.2 % (ref 18–48)
MCH RBC QN AUTO: 30.7 PG (ref 27–31)
MCHC RBC AUTO-ENTMCNC: 31.3 G/DL (ref 32–36)
MCV RBC AUTO: 98 FL (ref 82–98)
MONOCYTES # BLD AUTO: 0.6 K/UL (ref 0.3–1)
MONOCYTES NFR BLD: 8.5 % (ref 4–15)
NEUTROPHILS # BLD AUTO: 5 K/UL (ref 1.8–7.7)
NEUTROPHILS NFR BLD: 71.6 % (ref 38–73)
NRBC BLD-RTO: 0 /100 WBC
PHOSPHATE SERPL-MCNC: 3.1 MG/DL (ref 2.7–4.5)
PLATELET # BLD AUTO: 154 K/UL (ref 150–350)
PMV BLD AUTO: 11.8 FL (ref 9.2–12.9)
POTASSIUM SERPL-SCNC: 3.5 MMOL/L (ref 3.5–5.1)
RBC # BLD AUTO: 4.4 M/UL (ref 4.6–6.2)
SODIUM SERPL-SCNC: 141 MMOL/L (ref 136–145)
WBC # BLD AUTO: 6.97 K/UL (ref 3.9–12.7)

## 2019-09-30 PROCEDURE — 80069 RENAL FUNCTION PANEL: CPT

## 2019-09-30 PROCEDURE — 85025 COMPLETE CBC W/AUTO DIFF WBC: CPT

## 2019-09-30 PROCEDURE — 36415 COLL VENOUS BLD VENIPUNCTURE: CPT | Mod: PO

## 2019-10-14 ENCOUNTER — TELEPHONE (OUTPATIENT)
Dept: RADIOLOGY | Facility: HOSPITAL | Age: 69
End: 2019-10-14

## 2019-10-15 ENCOUNTER — HOSPITAL ENCOUNTER (OUTPATIENT)
Dept: RADIOLOGY | Facility: HOSPITAL | Age: 69
Discharge: HOME | End: 2019-10-15
Attending: UROLOGY
Payer: MEDICARE

## 2019-10-15 DIAGNOSIS — N99.111 POSTPROCEDURAL BULBOUS URETHRAL STRICTURE: ICD-10-CM

## 2019-10-15 PROCEDURE — 74450 X-RAY URETHRA/BLADDER: CPT | Mod: 26,,, | Performed by: RADIOLOGY

## 2019-10-15 PROCEDURE — 74450 X-RAY URETHRA/BLADDER: CPT | Mod: TC

## 2019-10-15 PROCEDURE — 51610 INJECTION FOR BLADDER X-RAY: CPT | Mod: ,,, | Performed by: RADIOLOGY

## 2019-10-15 PROCEDURE — 51610 FL URETHROGRAM RETROGRADE: ICD-10-PCS | Mod: ,,, | Performed by: RADIOLOGY

## 2019-10-15 PROCEDURE — 25500020 PHARM REV CODE 255: Performed by: UROLOGY

## 2019-10-15 PROCEDURE — 74450 FL URETHROGRAM RETROGRADE: ICD-10-PCS | Mod: 26,,, | Performed by: RADIOLOGY

## 2019-10-15 RX ADMIN — IOTHALAMATE MEGLUMINE 50 ML: 172 INJECTION URETERAL at 01:10

## 2019-10-17 ENCOUNTER — OFFICE VISIT (OUTPATIENT)
Dept: UROLOGY | Facility: CLINIC | Age: 69
End: 2019-10-17
Payer: MEDICARE

## 2019-10-17 ENCOUNTER — TELEPHONE (OUTPATIENT)
Dept: UROLOGY | Facility: CLINIC | Age: 69
End: 2019-10-17

## 2019-10-17 VITALS
BODY MASS INDEX: 21.46 KG/M2 | WEIGHT: 153.88 LBS | DIASTOLIC BLOOD PRESSURE: 87 MMHG | SYSTOLIC BLOOD PRESSURE: 133 MMHG | HEART RATE: 76 BPM

## 2019-10-17 DIAGNOSIS — N99.114 POSTPROCEDURAL STRICTURE OF ANTERIOR URETHRA: Primary | ICD-10-CM

## 2019-10-17 DIAGNOSIS — K13.70 LESION OF BUCCAL MUCOSA: ICD-10-CM

## 2019-10-17 PROCEDURE — 99215 OFFICE O/P EST HI 40 MIN: CPT | Mod: 57,S$GLB,, | Performed by: UROLOGY

## 2019-10-17 PROCEDURE — 3079F DIAST BP 80-89 MM HG: CPT | Mod: S$GLB,,, | Performed by: UROLOGY

## 2019-10-17 PROCEDURE — 1100F PR PT FALLS ASSESS DOC 2+ FALLS/FALL W/INJURY/YR: ICD-10-PCS | Mod: S$GLB,,, | Performed by: UROLOGY

## 2019-10-17 PROCEDURE — 99215 PR OFFICE/OUTPT VISIT, EST, LEVL V, 40-54 MIN: ICD-10-PCS | Mod: 57,S$GLB,, | Performed by: UROLOGY

## 2019-10-17 PROCEDURE — 99999 PR PBB SHADOW E&M-EST. PATIENT-LVL IV: CPT | Mod: PBBFAC,,, | Performed by: UROLOGY

## 2019-10-17 PROCEDURE — 3288F PR FALLS RISK ASSESSMENT DOCUMENTED: ICD-10-PCS | Mod: S$GLB,,, | Performed by: UROLOGY

## 2019-10-17 PROCEDURE — 87086 URINE CULTURE/COLONY COUNT: CPT

## 2019-10-17 PROCEDURE — 1100F PTFALLS ASSESS-DOCD GE2>/YR: CPT | Mod: S$GLB,,, | Performed by: UROLOGY

## 2019-10-17 PROCEDURE — 99999 PR PBB SHADOW E&M-EST. PATIENT-LVL IV: ICD-10-PCS | Mod: PBBFAC,,, | Performed by: UROLOGY

## 2019-10-17 PROCEDURE — 3075F PR MOST RECENT SYSTOLIC BLOOD PRESS GE 130-139MM HG: ICD-10-PCS | Mod: S$GLB,,, | Performed by: UROLOGY

## 2019-10-17 PROCEDURE — 3288F FALL RISK ASSESSMENT DOCD: CPT | Mod: S$GLB,,, | Performed by: UROLOGY

## 2019-10-17 PROCEDURE — 3075F SYST BP GE 130 - 139MM HG: CPT | Mod: S$GLB,,, | Performed by: UROLOGY

## 2019-10-17 PROCEDURE — 3079F PR MOST RECENT DIASTOLIC BLOOD PRESSURE 80-89 MM HG: ICD-10-PCS | Mod: S$GLB,,, | Performed by: UROLOGY

## 2019-10-17 NOTE — TELEPHONE ENCOUNTER
Postprocedural stricture of anterior urethra  -     Case Request Operating Room: URETHROPLASTY, USING PATCH GRAFT BUCCAL MUCOSA GRAFT, CYSTOSCOPY, FLEXIBLE

## 2019-10-17 NOTE — PROGRESS NOTES
CHIEF COMPLAINT:    Mr. Burkett is a 69 y.o. male presenting for evaluation and management of recurrent urethral stricture.    Alin Burkett is a 69 y.o. male with a PMH of urethral stricture, s/p transplant kidney, ED.  C/o weak urine flow.    He has had cysto DVIU x 3 for his recurrent urethral stricture ( 12/19/18, 12/2017 and 7/2017).   Last DVIU Performed on 12/19/18:   1.  Cystoscopy with DVIU  2.  Urethral dilation   Findings:   - tight stricture in pendulous urethra about 3 cm in length, dilated from 10 - 22 Fr with Heymen dilators. Urethrotome used to open strictured area.  - Large mouth bladder diverticula with multiple outpouchings    He has learned how to do CIC.  However, he noticed that following CIC, he experienced some purulent drainage coming out of his urethra.  Thus he stopped doing CIC.   He reports some discomfort at the tip of his penis yesterday.  He has a history of recurrent UTIs.    His most recent urine cultures are proteus, enterococcus, and klebsiella.  His only symptom is small degree of burning before he urinates whenever his urine cultures are positive.    s/p cysto and urethral dilation for anterior urethral stricture on 12/17.  Bladder diverticulum was also seen on cysto.     He has a history of self-cath with 16 Fr catheter occasionally but has not does this in over 3 months.  His stream used to be good while he was doing CICs, but he reports occasional trickling. He also occasionally passes some debris that may be stones.     He had a kidney transplant in 11/ 2016       REVIEW OF SYSTEMS:    Constitutional: Negative for fever and chills.   HENT: Negative for hearing loss.   Eyes: Negative for visual disturbance.   Respiratory: Negative for shortness of breath.   Cardiovascular: Negative for chest pain.   Gastrointestinal: Negative for vomiting, and constipation.   Genitourinary: See HPI  Neurological: Negative for dizziness.   Hematological: Does not bruise/bleed easily.    Psychiatric/Behavioral: Negative for confusion.       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  Renal allograft biopsy 17 (DIVINE): 21 glomeruli, none globally sclerosed, <5% interstitial fibrosis, no ACR, c4d negative, AVR CCT Type 2 (V1 lesion); plan THYMO     Dissecting aortic aneurysm (any part), abdominal     Diverticulosis     Encounter for blood transfusion     ESRD (end stage renal disease) 2010    H/O urethral stricture 2018    H/O: urethral stricture     History of AAA (abdominal aortic aneurysm) repair     History of urethral stricture 2018    Hypertension     Hypokalemia     Hypothyroidism 1/10/2014    Inguinal hernia bilateral, non-recurrent     Kidney stones     Organ transplant candidate 2013    Plantar warts 1/10/2014    Recurrent UTI 2017    S/P kidney transplant     Secondary hyperparathyroidism, renal     Thyroid disease        Past Surgical History:   Procedure Laterality Date    ABDOMINAL SURGERY      exploratory lapatomy x 2    ABLATION N/A 2019    Procedure: ABLATION, SVT;  Surgeon: Emerson Mcmanus MD;  Location: [facility];  Service: Cardiology;  Laterality: N/A;  SVT, RFA, CARTO, anes, GP, 323    AORTA - SUPERIOR MESENTERIC ARTERY BYPASS GRAFT      BLADDER NECK RECONSTRUCTION      BLADDER SURGERY      COLONOSCOPY  10/10/2013    Dr. Gutierrez, repeat in 5 years     CYSTOSCOPY      CYSTOSCOPY N/A 12/19/2018    Procedure: CYSTOSCOPY;  Surgeon: Dewey Mann MD;  Location: [facility] OR [address] [place] [address];  Service: Urology;  Laterality: N/A;  45 min    CYSTOURETHROSCOPY WITH DIRECT VISION INTERNAL URETHROTOMY N/A 12/19/2018    Procedure: CYSTOSCOPY, WITH DIRECT VISION INTERNAL URETHROTOMY;  Surgeon: Dewey Mann MD;  [facility];  Service: Urology;  Laterality: N/A;    DILATION OF URETHRA N/A 12/19/2018    Procedure: DILATION, URETHRA;  Surgeon: Dewey Mann MD;  Location: [facility] OR [address] [place] [address];  Service: Urology;  Laterality: N/A;    GASTROJEJUNOSTOMY      HEMORRHOID SURGERY      HERNIA REPAIR      KIDNEY TRANSPLANT      LEFT HEART CATHETERIZATION Left 8/20/2019    Procedure: Left heart cath;  Surgeon: Javan Oscar MD;  Location: [facility] CATH/EP LAB;  Service: Cardiology;  Laterality: Left;    LITHOTRIPSY      PERCUTANEOUS NEPHROLITHOTRIPSY      right  ESWL  10/31/12    right ESWL  6/26/12       Family History   Problem Relation Age of Onset    Diabetes Mother     Alzheimer's disease Mother     Alcohol abuse Father     HIV Brother     Stroke Maternal Aunt     Kidney disease Paternal Uncle     Kidney disease Cousin     No Known Problems Sister     No Known Problems Daughter     No Known Problems Sister     No Known Problems Brother     No Known Problems Brother     Cancer Brother         thyroid cancer    Melanoma Neg Hx     Psoriasis Neg Hx     Lupus Neg Hx     Eczema Neg Hx     Colon cancer Neg Hx     Colon polyps Neg Hx     Crohn's disease Neg Hx     Ulcerative colitis Neg Hx     Celiac disease Neg Hx        Social History     Socioeconomic History    Marital status: Significant Other     Spouse name: Not on file    Number of children: Not on file    Years of education: Not on file    Highest education level: Not on file   Occupational History     Employer: Disabled   Social Needs    Financial resource strain: Not on file    Food  insecurity:     Worry: Not on file     Inability: Not on file    Transportation needs:     Medical: Not on file     Non-medical: Not on file   Tobacco Use    Smoking status: Former Smoker     Years: 40.00     Types: Cigarettes     Last attempt to quit: 2010     Years since quittin.3    Smokeless tobacco: Never Used   Substance and Sexual Activity    Alcohol use: No     Comment: stopped ETOH in     Drug use: No     Comment: THC in youth    Sexual activity: Yes     Partners: Female     Birth control/protection: None   Lifestyle    Physical activity:     Days per week: Not on file     Minutes per session: Not on file    Stress: Not on file   Relationships    Social connections:     Talks on phone: Not on file     Gets together: Not on file     Attends Alevism service: Not on file     Active member of club or organization: Not on file     Attends meetings of clubs or organizations: Not on file     Relationship status: Not on file   Other Topics Concern    Not on file   Social History Narrative    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    Medications:    Current Outpatient Medications:     aspirin (ECOTRIN) 81 MG EC tablet, Take 1 tablet (81 mg total) by mouth once daily., Disp: , Rfl: 0    calcitRIOL (ROCALTROL) 0.25 MCG Cap, Take 1 capsule (0.25 mcg total) by mouth once daily., Disp: 30 capsule, Rfl: 11    famotidine (PEPCID) 20 MG tablet, Take 1 tablet (20 mg total) by mouth every evening., Disp: 90 tablet, Rfl: 3    FLUAD 8900-1260, 65 YR UP,,PF, 45 mcg (15 mcg x 3)/0.5 mL Syrg, PHARMACIST ADMINISTERED IMMUNIZATION ADMINISTERED AT TIME OF DISPENSING, Disp: , Rfl: 0    ketoconazole (NIZORAL) 200 mg Tab, Take 0.5 tablets (100 mg total) by mouth once daily., Disp: 45 tablet, Rfl: 3    levoFLOXacin (LEVAQUIN) 750 MG tablet, Take 1 tablet (750 mg total) by mouth every other day., Disp: 5 tablet, Rfl: 0    levothyroxine (SYNTHROID)  100 MCG tablet, Take 1 tablet (100 mcg total) by mouth once daily., Disp: 90 tablet, Rfl: 3    magnesium oxide (MAG-OX) 400 mg (241.3 mg magnesium) tablet, Take 1 tablet (400 mg total) by mouth once daily., Disp: 90 tablet, Rfl: 3    metoprolol tartrate (LOPRESSOR) 25 MG tablet, Take 0.5 tablets (12.5 mg total) by mouth 2 (two) times daily., Disp: 90 tablet, Rfl: 3    multivitamin (ONE DAILY MULTIVITAMIN) per tablet, Take 1 tablet by mouth once daily., Disp: , Rfl:     mycophenolate (CELLCEPT) 250 mg Cap, Take 3 capsules (750 mg total) by mouth 2 (two) times daily. Z94.0/Kidney transplant on 11/26/16, Disp: 180 capsule, Rfl: 11    mycophenolate (CELLCEPT) 250 mg Cap, , Disp: , Rfl:     pep injection, Inject 0.25 ml as directed.  May increase by 0.05 ml such as 0.30, 0.35 ml etc. Up to 1.00 ml Until adequate result is achieved.  For compounding pharmacy use:  Add PAPAVERINE 30 mcg Add PHENTOLAMINE 10 mg Add ALPROSTADIL 100 mcg, Disp: 1 vial, Rfl: 2    predniSONE (DELTASONE) 5 MG tablet, Take 1 tablet (5 mg total) by mouth once daily. Z94.0/Kidney transplant on 11/26/2016, Disp: 90 tablet, Rfl: 3    sodium bicarbonate 650 MG tablet, Take 1 tablet (650 mg total) by mouth 2 (two) times daily., Disp: 540 tablet, Rfl: 3    tacrolimus (PROGRAF) 1 MG Cap, Take 3 capsules (3 mg total) by mouth every morning AND 2 capsules (2 mg total) every evening. Z94.0/Kidney Transplant on 11/26/16., Disp: 450 capsule, Rfl: 3    PHYSICAL EXAMINATION:    Constitutional: He appears well-developed and well-nourished.  He is in no apparent distress.    Eyes: No scleral icterus noted bilaterally. No discharge bilaterally.    Nose: No rhinorrhea    Cardiovascular: Normal rate.  No pitting edema noted in lower extremities bilaterally    Pulmonary/Chest: Effort normal. No respiratory distress.     Abdominal:  He exhibits no distension.  There is no CVA tenderness.     Lymphadenopathy:        Right: No supraclavicular adenopathy present.         Left: No supraclavicular adenopathy present.     Neurological: He is alert and oriented to person, place, and time.     Skin: Skin is warm and dry.     Psych: Cooperative with normal affect.    Genitourinary: The penis is circumcised.     Physical Exam      LABS:    Lab Results   Component Value Date    PSA 0.41 10/18/2016    PSA 0.32 10/20/2015    PSA 0.45 11/04/2014     RUG 10/15/19  There is a 1.3 cm stricture at the proximal penile urethra.  This stricture measures around 3 mm at its most narrow diameter.  There are no areas of contrast extravasation to suggest leak.  No mass or soft tissue inflammation surrounding the visualized urethra.    Post evacuation imaging is unremarkable.    IMPRESSION:    Postprocedural stricture of anterior urethra  -     EKG 12-lead; Future; Expected date: 10/17/2019  -     Urine culture  -     CBC auto differential; Future; Expected date: 10/17/2019  -     Comprehensive metabolic panel; Future; Expected date: 10/17/2019    Lesion of buccal mucosa  -     Ambulatory Referral to ENT      PLAN:    RUG revealed a tight urethral stricutre involving the pendulous urethra measuring 1.5 cm.  However, he may have more scar tissues proximal and distal to this stricture.  Thus most likely he will need buccal mucosa graft.  Will get an ENT consult, Dr. Cardoza to help us obtain his buccal mucosa graft 3 to 4 cm in length.  Will require transplant team consult regarding his healing condition.  He will require minimal 2 wks of fournier catheter if not longer given his medical condition.  All questions answered.  I spent 40 minutes with the patient of which more than half was spent in direct consultation with the patient in regards to our treatment and plan.    Follow up:  Follow up Urethroplasty with buccal mucosa graft.

## 2019-10-18 LAB — BACTERIA UR CULT: NO GROWTH

## 2019-10-28 ENCOUNTER — LAB VISIT (OUTPATIENT)
Dept: LAB | Facility: HOSPITAL | Age: 69
End: 2019-10-28
Attending: UROLOGY
Payer: MEDICARE

## 2019-10-28 DIAGNOSIS — N99.114 POSTPROCEDURAL STRICTURE OF ANTERIOR URETHRA: ICD-10-CM

## 2019-10-28 LAB
ALBUMIN SERPL BCP-MCNC: 3.9 G/DL (ref 3.5–5.2)
ALP SERPL-CCNC: 62 U/L (ref 55–135)
ALT SERPL W/O P-5'-P-CCNC: 34 U/L (ref 10–44)
ANION GAP SERPL CALC-SCNC: 7 MMOL/L (ref 8–16)
AST SERPL-CCNC: 37 U/L (ref 10–40)
BASOPHILS # BLD AUTO: 0.01 K/UL (ref 0–0.2)
BASOPHILS NFR BLD: 0.1 % (ref 0–1.9)
BILIRUB SERPL-MCNC: 0.5 MG/DL (ref 0.1–1)
BUN SERPL-MCNC: 21 MG/DL (ref 8–23)
CALCIUM SERPL-MCNC: 10.3 MG/DL (ref 8.7–10.5)
CHLORIDE SERPL-SCNC: 107 MMOL/L (ref 95–110)
CO2 SERPL-SCNC: 27 MMOL/L (ref 23–29)
CREAT SERPL-MCNC: 1.5 MG/DL (ref 0.5–1.4)
DIFFERENTIAL METHOD: ABNORMAL
EOSINOPHIL # BLD AUTO: 0.1 K/UL (ref 0–0.5)
EOSINOPHIL NFR BLD: 0.8 % (ref 0–8)
ERYTHROCYTE [DISTWIDTH] IN BLOOD BY AUTOMATED COUNT: 14.9 % (ref 11.5–14.5)
EST. GFR  (AFRICAN AMERICAN): 54.5 ML/MIN/1.73 M^2
EST. GFR  (NON AFRICAN AMERICAN): 47.2 ML/MIN/1.73 M^2
GLUCOSE SERPL-MCNC: 85 MG/DL (ref 70–110)
HCT VFR BLD AUTO: 43.7 % (ref 40–54)
HGB BLD-MCNC: 13.7 G/DL (ref 14–18)
IMM GRANULOCYTES # BLD AUTO: 0.03 K/UL (ref 0–0.04)
IMM GRANULOCYTES NFR BLD AUTO: 0.4 % (ref 0–0.5)
LYMPHOCYTES # BLD AUTO: 0.9 K/UL (ref 1–4.8)
LYMPHOCYTES NFR BLD: 12.9 % (ref 18–48)
MCH RBC QN AUTO: 30.5 PG (ref 27–31)
MCHC RBC AUTO-ENTMCNC: 31.4 G/DL (ref 32–36)
MCV RBC AUTO: 97 FL (ref 82–98)
MONOCYTES # BLD AUTO: 0.4 K/UL (ref 0.3–1)
MONOCYTES NFR BLD: 5.8 % (ref 4–15)
NEUTROPHILS # BLD AUTO: 5.8 K/UL (ref 1.8–7.7)
NEUTROPHILS NFR BLD: 80 % (ref 38–73)
NRBC BLD-RTO: 0 /100 WBC
PLATELET # BLD AUTO: 159 K/UL (ref 150–350)
PMV BLD AUTO: 12.2 FL (ref 9.2–12.9)
POTASSIUM SERPL-SCNC: 4.5 MMOL/L (ref 3.5–5.1)
PROT SERPL-MCNC: 7.1 G/DL (ref 6–8.4)
RBC # BLD AUTO: 4.49 M/UL (ref 4.6–6.2)
SODIUM SERPL-SCNC: 141 MMOL/L (ref 136–145)
WBC # BLD AUTO: 7.26 K/UL (ref 3.9–12.7)

## 2019-10-28 PROCEDURE — 85025 COMPLETE CBC W/AUTO DIFF WBC: CPT

## 2019-10-28 PROCEDURE — 36415 COLL VENOUS BLD VENIPUNCTURE: CPT | Mod: PO

## 2019-10-28 PROCEDURE — 80053 COMPREHEN METABOLIC PANEL: CPT

## 2019-10-29 ENCOUNTER — TELEPHONE (OUTPATIENT)
Dept: FAMILY MEDICINE | Facility: CLINIC | Age: 69
End: 2019-10-29

## 2019-10-29 ENCOUNTER — ANESTHESIA EVENT (OUTPATIENT)
Dept: SURGERY | Facility: HOSPITAL | Age: 69
End: 2019-10-29
Payer: MEDICARE

## 2019-10-29 ENCOUNTER — TELEPHONE (OUTPATIENT)
Dept: TRANSPLANT | Facility: CLINIC | Age: 69
End: 2019-10-29

## 2019-10-29 NOTE — TELEPHONE ENCOUNTER
Cross cover for Dr. Mcclain:  Asked to provide transplant can rinse for patient with 1.3 cm stricture at the proximal penile urethra to have Urethroplasty with buccal mucosa graft 11/6/2019.     Transplant history reviewed:  -  ESRD HD due to HTN  and congenital absent left kidney.   -  PHS-IR DDKT induced w Camp[place]. KDPI 36%  -  Kidney biopsy 01/06/2017 showed AVR, treated thymo x5 doses  -  Kidney biopsy 01/27/2017 showed ATI with +hemoglobin, negative heme     10/28/2019 labs reviewed %period% creatinine 1.5 with GFR 54. Baseline creatinine appears to be 1.4-1.6.      Looking at transplant records and kidney numbers alone [w/o benefit of recent transplant eval], he is medically stable to undergo planned surgery from renal transplant perspective. A medical preop eval should be considered to ensure HTN is controlled and no other medical issues present..

## 2019-10-29 NOTE — TELEPHONE ENCOUNTER
----- Message from Lisa Frank RN sent at 10/29/2019 10:03 AM CDT -----  ##Please note corrected patient listed above##    This patient is scheduled for Urethroplasty with mucosa graft and flexible cysto with Dr. Mann on 11/6/2019.  (General anesthesia, 210 minutes).  Anesthesia review is in progress.    Is patient optimized/cleared?  Please advise.    Thank you,  Lisa Frank RN  Anesthesia [facility], [facility] [place]

## 2019-10-29 NOTE — TELEPHONE ENCOUNTER
----- Message from Lisa Frank RN sent at 10/29/2019  9:59 AM CDT -----  This patient is scheduled for Urethroplasty with mucosa graft and flexible cysto with Dr. Mann on 11/6/2019.  (General anesthesia, 210 minutes).  Anesthesia review is in progress.    Are there any recommendations?    Thank you,  Lisa Frank RN  [facility] [place]

## 2019-10-29 NOTE — TELEPHONE ENCOUNTER
Patient has not been seen since 2018. Will need appt for preop clearance.  Please schedule with me or IRINA Dial

## 2019-10-29 NOTE — ANESTHESIA PREPROCEDURE EVALUATION
Lisa Frank, RN   Registered Nurse      Pre Admission Screening   Signed                     Anesthesia Assessment: Preoperative EQUATION     Planned Procedure: Procedure(s) (LRB):  URETHROPLASTY, USING PATCH GRAFT BUCCAL MUCOSA GRAFT (N/A)  CYSTOSCOPY, FLEXIBLE (N/A)  Requested Anesthesia Type:General  Surgeon: Dewey Mann MD  Service: Urology  Known or anticipated Date of Surgery:11/6/2019     Surgeon notes: reviewed     Previous anesthesia records:GETA and No problems  8/22/2019 Ablation/SVT (ASA4)  Airway/Jaw/Neck:  Airway Findings: Mouth Opening: Normal Tongue: Normal  General Airway Assessment: Adult  Mallampati: II  Improves to II with phonation.  TM Distance: Normal, at least 6 cm     Last PCP note: 6-12 months ago , within [facility] K. Pippin NP  Subspecialty notes: Cardiology: EP and General, Kidney Transplant, Nephrology, Urology     Other important co-morbidities: Per Epic:  Hypothyroid, LOUISA, BPH, ESRD (HD: TTS schedule), HTN, CHF, HFpEF, Hx AAA repair     Tests already available:  Available tests,  within 1 month , within [facility] .  10/28/2019 CMP, CBC; 9/30/2019 Renal Fx; 8/20/2019 TSH; 8/22/2019 EKG, TTE, Stress Test                            Instructions given. (See in Nurse's note)     Optimization:  Anesthesia Preop Clinic Assessment  Indicated.    Medical Opinion Indicated. TBC[facility] OS Card Dr. Simons; Transplant notified.                               Plan:               Testing:  None needed   Pre-anesthesia  visit                                        Visit focus: concerns in complex and/or prolonged anesthesia, (P2/ASA4)                           Consultation: OS card Dr. Simons                           Patient  has previously scheduled Medical Appointment: 10/30/2019     Navigation: Tests Scheduled.                         Consults scheduled.                        Results will be tracked by Preop Clinic.        10/30/2019  Transplant recommendations noted.  Discussed with Dr. Porter.   Pt TBCB PCP in addition to Cardiology.  Spoke with PCP office, appt to be scheduled.    10/29/19 Recommendations from Transplant  Cross cover for Dr. Mcclain:  Asked to provide transplant can rinse for patient with 1.3 cm stricture at the proximal penile urethra to have Urethroplasty with buccal mucosa graft 11/6/2019.      Transplant history reviewed:  -  ESRD HD due to HTN  and congenital absent left kidney.   -  PHS-IR DDKT induced w [place]. KDPI 36%  -  Kidney biopsy 01/06/2017 showed AVR, treated thymo x5 doses  -  Kidney biopsy 01/27/2017 showed ATI with +hemoglobin, negative heme      10/28/2019 labs reviewed %period% creatinine 1.5 with GFR 54. Baseline creatinine /appears to be 1.4-1.6.       Looking at transplant records and kidney numbers alone [w/o benefit of recent transplant eval], he is medically stable to undergo planned surgery from renal transplant perspective. A medical preop eval should be considered to ensure HTN is controlled and no other medical issues present..    10/31/2019  POC visit completed; Dr. Villa reviewed chart and spoke with patient, PCP clear was waived.  Card clearance and ASA inst remain pending.    11/1/2019  Cardiac clearance and ASA instructions noted. (Scanned to media)      TTE Echo 8/20/19  · Concentric left ventricular hypertrophy.  · Normal left ventricular systolic function. The estimated ejection fraction is 65%  · Grade II (moderate) left ventricular diastolic dysfunction consistent with pseudonormalization.  · Elevated left atrial pressure.  · Moderate outflow tract left ventricular obstruction is present.  · No wall motion abnormalities.  · Normal right ventricular systolic function.  · Mild mitral regurgitation.  · Mild to moderate tricuspid regurgitation.  · Mild pulmonary hypertension present.  · Normal central venous pressure (3 mm Hg).  · The estimated PA systolic pressure is 41 mm Hg     Classic  Hypertrophic subaortic  Stenosis with FRANCISCO and outflow tract  obstruction and mitral reguritation ( late)    Septum  Slightly more thickened than post wall                                                                                                                 10/29/2019  Alin Burkett is a 69 y.o., male with hx significant for SVT s/p ablation 2019, Prior ESRD s/p kidney tx, prior AAA repair with recurrent UTIs and urethral stricture    10/31/19  Transplant cleared him for surgery. They recommended PCP but his only other significant hx is cardiac in nature. Cardiology approval is in process. HTN has been well controlled and this is managed by Transplant.  BIANKA Villa MD     Active Ambulatory Problems     Diagnosis Date Noted    Adrenal adenoma     Essential hypertension     Congenital absence of kidney     Anemia of chronic disease     Secondary hyperparathyroidism, renal     Hypothyroidism 01/10/2014    Plantar warts 01/10/2014    Arrhythmia, onset 2015    History of smoking 10-25 pack years, quit , 20 pack-years 2015    History of alcohol abuse, quit 2015    LOUISA (obstructive sleep apnea), CPAP refused 2015    LVH (left ventricular hypertrophy) due to hypertensive disease 2015    Acute coronary syndrome 2016    BPH (benign prostatic hyperplasia) 10/18/2016    Long-term use of immunosuppressant medication 2016    -donor kidney transplant     Hypophosphatemia 01/15/2017    Stage 3 chronic kidney disease 2017    Stricture of anterior urethra in male 2017    Recurrent UTI 2017    Bladder diverticulum 2017    Thrombocytopenia 2019    Abdominal aortic atherosclerosis 2019    Abdominal aortic aneurysm (AAA) without rupture 2019    SVT (supraventricular tachycardia) 2019    AMI (acute myocardial infarction) 2019    Elevated troponin 2019    UTI due to Klebsiella species 2019    C. difficile diarrhea 2019      Resolved Ambulatory Problems     Diagnosis Date Noted    Calcium nephrolithiasis 10/16/2012    ESRD (end stage renal disease) -06/16/2010 10/16/2012    Ectopic kidney 11/15/2012    Basal cell carcinoma - of the nose     History of AAA (abdominal aortic aneurysm) repair, 1997     Metabolic acidosis     Hypokalemia     Chronic diarrhea     Screening for colon cancer 10/10/2013    Awaiting organ transplant status Actively listed since 10/26/2011 11/26/2013    Left lateral abdominal pain 01/19/2015    Abnormal ECG 05/01/2015    VPC (ventricular premature complex) 05/01/2015    Body mass index (BMI) of 20.0-20.9 in adult 05/01/2015    Elevated troponin I level 06/20/2016    Prophylactic immunotherapy (transplant immunosuppression) 11/28/2016    DIVINE (acute kidney injury) 01/06/2017    Acute vascular rejection of kidney transplant 1/6/17 01/09/2017    S/P kidney transplant     Complication of kidney transplant 01/23/2017    H/O urethral stricture 11/27/2018    History of urethral stricture 12/19/2018     Past Medical History:   Diagnosis Date    Acidosis     Anemia associated with chronic renal failure     Blood type B+ 11/26/2013    Cancer     Celiac artery dissection     Chronic urethral stricture     Dissecting aortic aneurysm (any part), abdominal     Diverticulosis     Encounter for blood transfusion     H/O: urethral stricture     Hypertension     Inguinal hernia bilateral, non-recurrent     Kidney stones     Organ transplant candidate 11/26/2013    Thyroid disease          Anesthesia Evaluation    I have reviewed the Patient Summary Reports.    I have reviewed the Nursing Notes.   I have reviewed the Medications.     Review of Systems  Anesthesia Hx:  No problems with previous Anesthesia Denies Hx of Anesthetic complications  History of prior surgery of interest to airway management or planning: Denies Family Hx of Anesthesia complications.   Denies Personal Hx of Anesthesia  complications.   Social:  Former Smoker    Hematology/Oncology:     Oncology Normal   Hematology Comments: Thrombocytopenia, mild   EENT/Dental:EENT/Dental Normal   Cardiovascular:   Exercise tolerance: good Hypertension Valvular problems/Murmurs Dysrhythmias CHF ECG has been reviewed. Recent elevation of Troponins in 8/2019 in the setting of SVT, s/p Ablation during that admission    Left heart cath 8/2019 Normal coronaries with sluggish flow in the LAD and the RCA    Hypertrophic subaortic  Stenosis, FRANCISCO noted on recent TTE    Previous AAA repair   Pulmonary:   Sleep Apnea    Renal/:   Chronic Renal Disease, CRI S/p Kidney Transplant 3 years ago   Hepatic/GI:  Hepatic/GI Normal    Musculoskeletal:  Musculoskeletal Normal    Neurological:  Neurology Normal    Endocrine:   Hypothyroidism    Dermatological:  Skin Normal    Psych:  Psychiatric Normal           Physical Exam  General:  Well nourished    Airway/Jaw/Neck:  Airway Findings: Mouth Opening: Normal Tongue: Normal  General Airway Assessment: Adult  Mallampati: III  Improves to II with phonation.  TM Distance: Normal, at least 6 cm  Jaw/Neck Findings:     Eyes/Ears/Nose:  EYES/EARS/NOSE FINDINGS: Normal   Dental:  Dental Findings:    Chest/Lungs:  Chest/Lungs Clear    Heart/Vascular:  Heart Findings: Normal Heart murmur: negative Vascular Findings:  Dialysis Access: AV Fistula LUE  Vascular Exam Findings: No thrill or bruit through LUE AVF     Abdomen:  Abdomen Findings: Normal    Musculoskeletal:  Musculoskeletal Findings: Normal   Skin:  Skin Findings: Normal    Mental Status:  Mental Status Findings:  Cooperative, Alert and Oriented         Anesthesia Plan  Type of Anesthesia, risks & benefits discussed:  Anesthesia Type:  general  Patient's Preference: GA  Intra-op Monitoring Plan: standard ASA monitors  Intra-op Monitoring Plan Comments:   Post Op Pain Control Plan: multimodal analgesia, per primary service following discharge from PACU and IV/PO  Opioids PRN  Post Op Pain Control Plan Comments:   Induction:   IV  Beta Blocker:  Patient is on a Beta-Blocker and has received one dose within the past 24 hours (No further documentation required).       Informed Consent: Patient understands risks and agrees with Anesthesia plan.  Questions answered. Anesthesia consent signed with patient.  ASA Score: 3     Day of Surgery Review of History & Physical:    H&P update referred to the surgeon.     Anesthesia Plan Notes: The patient's PMH was reviewed and PE was performed  Plan for GETA        Ready For Surgery From Anesthesia Perspective.

## 2019-10-29 NOTE — TELEPHONE ENCOUNTER
Returned call and spoke to Lisa regarding patient clearance for surgery. Lisa was informed that patient has not seen Dr. Krueger since 4/20/18 and will need to come in for surgery clearance. Lisa also was informed that patient stated that his cardiologist Dr. Simons will be cleaning him for surgery.

## 2019-10-29 NOTE — PRE ADMISSION SCREENING
Anesthesia Assessment: Preoperative EQUATION    Planned Procedure: Procedure(s) (LRB):  URETHROPLASTY, USING PATCH GRAFT BUCCAL MUCOSA GRAFT (N/A)  CYSTOSCOPY, FLEXIBLE (N/A)  Requested Anesthesia Type:General  Surgeon: Dewey Mann MD  Service: Urology  Known or anticipated Date of Surgery:11/6/2019    Surgeon notes: reviewed    Previous anesthesia records:GETA and No problems  8/22/2019 Ablation/SVT (ASA4)  Airway/Jaw/Neck:  Airway Findings: Mouth Opening: Normal Tongue: Normal  General Airway Assessment: Adult  Mallampati: II  Improves to II with phonation.  TM Distance: Normal, at least 6 cm    Last PCP note: 6-12 months ago , within [facility]  KIKI Canseco NP  Subspecialty notes: Cardiology: EP and General, Kidney Transplant, Nephrology, Urology    Other important co-morbidities: Per Epic:  Hypothyroid, LOUISA, BPH, ESRD (HD: TTS schedule), HTN, CHF, HFpEF, Hx AAA repair     Tests already available:  Available tests,  within 1 month , within [facility] .  10/28/2019 CMP, CBC; 9/30/2019 Renal Fx; 8/20/2019 TSH; 8/22/2019 EKG, TTE, Stress Test            Instructions given. (See in Nurse's note)    Optimization:  Anesthesia Preop Clinic Assessment  Indicated.    Medical Opinion Indicated. [facility] OS Card Dr. Simons; Transplant notified.          Plan:    Testing:  None needed   Pre-anesthesia  visit       Visit focus: concerns in complex and/or prolonged anesthesia, (P2/ASA4)     Consultation:OS card Dr. Simons     Patient  has previously scheduled Medical Appointment: 10/30/2019    Navigation: Tests Scheduled.              Consults scheduled.             Results will be tracked by Preop Clinic.

## 2019-10-29 NOTE — TELEPHONE ENCOUNTER
Spoke with patient cardiologist is clearing him for surgery and does not need clearance from his PCP

## 2019-10-29 NOTE — TELEPHONE ENCOUNTER
----- Message from Lisa Frank RN sent at 10/29/2019 10:03 AM CDT -----  ##Please note corrected patient listed above##    This patient is scheduled for Urethroplasty with mucosa graft and flexible cysto with Dr. Mann on 11/6/2019.  (General anesthesia, 210 minutes).  Anesthesia review is in progress.    Is patient optimized/cleared?  Please advise.    Thank you,  Lisa Frank RN  Anesthesia [facility], [facility] [place]

## 2019-10-30 ENCOUNTER — HOSPITAL ENCOUNTER (OUTPATIENT)
Dept: CARDIOLOGY | Facility: CLINIC | Age: 69
Discharge: HOME | End: 2019-10-30
Payer: MEDICARE

## 2019-10-30 ENCOUNTER — OFFICE VISIT (OUTPATIENT)
Dept: UROLOGY | Facility: CLINIC | Age: 69
End: 2019-10-30
Payer: MEDICARE

## 2019-10-30 ENCOUNTER — OFFICE VISIT (OUTPATIENT)
Dept: OTOLARYNGOLOGY | Facility: CLINIC | Age: 69
End: 2019-10-30
Payer: MEDICARE

## 2019-10-30 ENCOUNTER — TELEPHONE (OUTPATIENT)
Dept: FAMILY MEDICINE | Facility: CLINIC | Age: 69
End: 2019-10-30

## 2019-10-30 VITALS
SYSTOLIC BLOOD PRESSURE: 119 MMHG | TEMPERATURE: 98 F | HEART RATE: 70 BPM | DIASTOLIC BLOOD PRESSURE: 87 MMHG | BODY MASS INDEX: 20.94 KG/M2 | WEIGHT: 150.13 LBS

## 2019-10-30 DIAGNOSIS — N35.914 STRICTURE OF ANTERIOR URETHRA IN MALE, UNSPECIFIED STRICTURE TYPE: Primary | ICD-10-CM

## 2019-10-30 DIAGNOSIS — N99.114 POSTPROCEDURAL STRICTURE OF ANTERIOR URETHRA: ICD-10-CM

## 2019-10-30 PROCEDURE — 93005 EKG 12-LEAD: ICD-10-PCS | Mod: S$GLB,,, | Performed by: UROLOGY

## 2019-10-30 PROCEDURE — 3079F DIAST BP 80-89 MM HG: CPT | Mod: S$GLB,,, | Performed by: OTOLARYNGOLOGY

## 2019-10-30 PROCEDURE — 99499 UNLISTED E&M SERVICE: CPT | Mod: S$GLB,,, | Performed by: UROLOGY

## 2019-10-30 PROCEDURE — 3288F FALL RISK ASSESSMENT DOCD: CPT | Mod: S$GLB,,, | Performed by: OTOLARYNGOLOGY

## 2019-10-30 PROCEDURE — 3074F SYST BP LT 130 MM HG: CPT | Mod: S$GLB,,, | Performed by: OTOLARYNGOLOGY

## 2019-10-30 PROCEDURE — 87147 CULTURE TYPE IMMUNOLOGIC: CPT

## 2019-10-30 PROCEDURE — 93005 ELECTROCARDIOGRAM TRACING: CPT | Mod: S$GLB,,, | Performed by: UROLOGY

## 2019-10-30 PROCEDURE — 87088 URINE BACTERIA CULTURE: CPT

## 2019-10-30 PROCEDURE — 99499 NO LOS: ICD-10-PCS | Mod: S$GLB,,, | Performed by: UROLOGY

## 2019-10-30 PROCEDURE — 99999 PR PBB SHADOW E&M-EST. PATIENT-LVL I: CPT | Mod: PBBFAC,,,

## 2019-10-30 PROCEDURE — 99999 PR PBB SHADOW E&M-EST. PATIENT-LVL III: CPT | Mod: PBBFAC,,, | Performed by: OTOLARYNGOLOGY

## 2019-10-30 PROCEDURE — 1100F PTFALLS ASSESS-DOCD GE2>/YR: CPT | Mod: S$GLB,,, | Performed by: OTOLARYNGOLOGY

## 2019-10-30 PROCEDURE — 1100F PR PT FALLS ASSESS DOC 2+ FALLS/FALL W/INJURY/YR: ICD-10-PCS | Mod: S$GLB,,, | Performed by: OTOLARYNGOLOGY

## 2019-10-30 PROCEDURE — 93010 ELECTROCARDIOGRAM REPORT: CPT | Mod: S$GLB,,, | Performed by: INTERNAL MEDICINE

## 2019-10-30 PROCEDURE — 3074F PR MOST RECENT SYSTOLIC BLOOD PRESSURE < 130 MM HG: ICD-10-PCS | Mod: S$GLB,,, | Performed by: OTOLARYNGOLOGY

## 2019-10-30 PROCEDURE — 99999 PR PBB SHADOW E&M-EST. PATIENT-LVL I: ICD-10-PCS | Mod: PBBFAC,,,

## 2019-10-30 PROCEDURE — 3288F PR FALLS RISK ASSESSMENT DOCUMENTED: ICD-10-PCS | Mod: S$GLB,,, | Performed by: OTOLARYNGOLOGY

## 2019-10-30 PROCEDURE — 3079F PR MOST RECENT DIASTOLIC BLOOD PRESSURE 80-89 MM HG: ICD-10-PCS | Mod: S$GLB,,, | Performed by: OTOLARYNGOLOGY

## 2019-10-30 PROCEDURE — 93010 EKG 12-LEAD: ICD-10-PCS | Mod: S$GLB,,, | Performed by: INTERNAL MEDICINE

## 2019-10-30 PROCEDURE — 99203 PR OFFICE/OUTPT VISIT, NEW, LEVL III, 30-44 MIN: ICD-10-PCS | Mod: S$GLB,,, | Performed by: OTOLARYNGOLOGY

## 2019-10-30 PROCEDURE — 99203 OFFICE O/P NEW LOW 30 MIN: CPT | Mod: S$GLB,,, | Performed by: OTOLARYNGOLOGY

## 2019-10-30 PROCEDURE — 87086 URINE CULTURE/COLONY COUNT: CPT

## 2019-10-30 PROCEDURE — 99999 PR PBB SHADOW E&M-EST. PATIENT-LVL III: ICD-10-PCS | Mod: PBBFAC,,, | Performed by: OTOLARYNGOLOGY

## 2019-10-30 RX ORDER — SODIUM CHLORIDE 9 MG/ML
INJECTION, SOLUTION INTRAVENOUS CONTINUOUS
Status: CANCELLED | OUTPATIENT
Start: 2019-10-30

## 2019-10-30 NOTE — PROGRESS NOTES
[facility] [place]  Staff: Dewey Mann MD    Is this patient in a research study? No    CC: urethral stricture    HPI:  Alin Burkett is a 69 y.o. male with a PMH of congenital urethral stricture, s/p transplant kidney, ED.  C/o weak urine flow.  C/o ED.  Not responded well to viagra in the past.    s/p cysto and urethral dilation for anterior urethral stricture on 12/17.  Bladder diverticulum was also seen on cysto.     He had the following procedure on 12/20/18 with Dr. Mann:  Procedure(s) Performed:   1.  Cystoscopy with DVIU  2.  Urethral dilation   Findings:   - tight stricture in pendulous urethra about 3 cm in length, dilated from 10 - 22 Fr with Heymen dilators. Urethrotome used to open strictured area.  - Large mouth bladder diverticula with multiple outpouchings     He has learned how to do CIC.  However, he noticed that following CIC, he experienced some purulent drainage coming out of his urethra.  Thus he stopped doing CIC.   He reports some discomfort at the tip of his penis.  He has a history of recurrent UTIs.    His most recent urine cultures are proteus, enterococcus, Klebsiella, and coag-negative staph.  Most recent urine culture showed no growth. His only symptom is small degree of burning before he urinates whenever his urine cultures are positive.    He has a history of self-cath with 16 Fr catheter occasionally but has not does this in several months, as he get UTIs when he does this.   His stream is good most of the time, but he reports occasional trickling. He also occasionally passes some debris that may be stones.     He had a kidney transplant in 11/ 2016     He has a significant cardiac history. Underwent ablation for SVT in 08/2019. His cardiologist is Dr. Antwon Simons, in [place]. He has an appointment scheduled with Cardiology later today; will need cardiology clearance. On ASA 81 mg.     ROS:  Neg except per HPI    Past Medical History:   Diagnosis Date    Acidosis     Adrenal  adenoma     Anemia associated with chronic renal failure     Arrhythmia, onset 2015    Awaiting organ transplant status 2013    Basal cell carcinoma 2012    left nasal tip    Blood type B+ 2013    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  Renal allograft biopsy 17 (DIVINE): 21 glomeruli, none globally sclerosed, <5% interstitial fibrosis, no ACR, c4d negative, AVR CCT Type 2 (V1 lesion); plan THYMO     Dissecting aortic aneurysm (any part), abdominal     Diverticulosis     Encounter for blood transfusion     ESRD (end stage renal disease) 2010    H/O urethral stricture 2018    H/O: urethral stricture     History of AAA (abdominal aortic aneurysm) repair     History of urethral stricture 2018    Hypertension     Hypokalemia     Hypothyroidism 1/10/2014    Inguinal hernia bilateral, non-recurrent     Kidney stones     Organ transplant candidate 2013    Plantar warts 1/10/2014    Recurrent UTI 2017    S/P kidney transplant     Secondary hyperparathyroidism, renal     Thyroid disease        Past Surgical History:   Procedure Laterality Date    ABDOMINAL SURGERY      exploratory lapatomy x 2    ABLATION N/A 2019    Procedure: ABLATION, SVT;  Surgeon: Emerson Mcmanus MD;  Location: [facility] EP LAB;  Service: Cardiology;  Laterality: N/A;  SVT, RFA, CARTO, anes, GP, 323    AORTA - SUPERIOR MESENTERIC ARTERY BYPASS GRAFT      BLADDER NECK RECONSTRUCTION      BLADDER SURGERY      COLONOSCOPY  10/10/2013    Dr. Gutierrez, repeat in 5 years    CYSTOSCOPY      CYSTOSCOPY N/A 2018    Procedure: CYSTOSCOPY;  Surgeon: Dewey Mann MD;  [facility];  Service:  Urology;  Laterality: N/A;  45 min    CYSTOURETHROSCOPY WITH DIRECT VISION INTERNAL URETHROTOMY N/A 2018    Procedure: CYSTOSCOPY, WITH DIRECT VISION INTERNAL URETHROTOMY;  Surgeon: Dewey Mann MD;  Location: [address];  Service: Urology;  Laterality: N/A;    DILATION OF URETHRA N/A 2018    Procedure: DILATION, URETHRA;  Surgeon: Dewey Mann MD;  Location: [address];  Service: Urology;  Laterality: N/A;    GASTROJEJUNOSTOMY      HEMORRHOID SURGERY      HERNIA REPAIR      KIDNEY TRANSPLANT      LEFT HEART CATHETERIZATION Left 2019    Procedure: Left heart cath;  Surgeon: Javan Oscar MD;  Location: [facility] CATH/EP LAB;  Service: Cardiology;  Laterality: Left;    LITHOTRIPSY      PERCUTANEOUS NEPHROLITHOTRIPSY      right  ESWL  10/31/12    right ESWL  12       Social History     Socioeconomic History    Marital status: Significant Other     Spouse name: Not on file    Number of children: Not on file    Years of education: Not on file    Highest education level: Not on file   Occupational History     Employer: Disabled   Social Needs    Financial resource strain: Not on file    Food insecurity:     Worry: Not on file     Inability: Not on file    Transportation needs:     Medical: Not on file     Non-medical: Not on file   Tobacco Use    Smoking status: Former Smoker     Years: 40.00     Types: Cigarettes     Last attempt to quit: 2010     Years since quittin.3    Smokeless tobacco: Never Used   Substance and Sexual Activity    Alcohol use: No     Comment: stopped ETOH in     Drug use: No     Comment: THC in youth    Sexual activity: Yes     Partners: Female     Birth control/protection: None   Lifestyle    Physical activity:     Days per week: Not on file     Minutes per session: Not on file    Stress: Not on file   Relationships    Social connections:     Talks on phone: Not on file     Gets together: Not on file     Attends Anglican  service: Not on file     Active member of club or organization: Not on file     Attends meetings of clubs or organizations: Not on file     Relationship status: Not on file   Other Topics Concern    Not on file   Social History Narrative    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       Family History   Problem Relation Age of Onset    Diabetes Mother     Alzheimer's disease Mother     Alcohol abuse Father     HIV Brother     Stroke Maternal Aunt     Kidney disease Paternal Uncle     Kidney disease Cousin     No Known Problems Sister     No Known Problems Daughter     No Known Problems Sister     No Known Problems Brother     No Known Problems Brother     Cancer Brother         thyroid cancer    Melanoma Neg Hx     Psoriasis Neg Hx     Lupus Neg Hx     Eczema Neg Hx     Colon cancer Neg Hx     Colon polyps Neg Hx     Crohn's disease Neg Hx     Ulcerative colitis Neg Hx     Celiac disease Neg Hx        Review of patient's allergies indicates:  No Known Allergies    Current Outpatient Medications on File Prior to Visit   Medication Sig Dispense Refill    aspirin (ECOTRIN) 81 MG EC tablet Take 1 tablet (81 mg total) by mouth once daily.  0    calcitRIOL (ROCALTROL) 0.25 MCG Cap Take 1 capsule (0.25 mcg total) by mouth once daily. 30 capsule 11    famotidine (PEPCID) 20 MG tablet Take 1 tablet (20 mg total) by mouth every evening. 90 tablet 3    FLUAD 7077-8601, 65 YR UP,,PF, 45 mcg (15 mcg x 3)/0.5 mL Syrg PHARMACIST ADMINISTERED IMMUNIZATION ADMINISTERED AT TIME OF DISPENSING  0    ketoconazole (NIZORAL) 200 mg Tab Take 0.5 tablets (100 mg total) by mouth once daily. 45 tablet 3    levoFLOXacin (LEVAQUIN) 750 MG tablet Take 1 tablet (750 mg total) by mouth every other day. 5 tablet 0    levothyroxine (SYNTHROID) 100 MCG tablet Take 1 tablet (100 mcg total) by mouth once daily. 90 tablet 3    magnesium oxide (MAG-OX) 400 mg (241.3 mg magnesium) tablet Take 1 tablet (400 mg total)  "by mouth once daily. 90 tablet 3    metoprolol tartrate (LOPRESSOR) 25 MG tablet Take 0.5 tablets (12.5 mg total) by mouth 2 (two) times daily. 90 tablet 3    multivitamin (ONE DAILY MULTIVITAMIN) per tablet Take 1 tablet by mouth once daily.      mycophenolate (CELLCEPT) 250 mg Cap Take 3 capsules (750 mg total) by mouth 2 (two) times daily. Z94.0/Kidney transplant on 11/26/16 180 capsule 11    mycophenolate (CELLCEPT) 250 mg Cap       pep injection Inject 0.25 ml as directed.   May increase by 0.05 ml such as 0.30, 0.35 ml etc. Up to 1.00 ml Until adequate result is achieved.    For compounding pharmacy use:   Add PAPAVERINE 30 mcg  Add PHENTOLAMINE 10 mg  Add ALPROSTADIL 100 mcg 1 vial 2    predniSONE (DELTASONE) 5 MG tablet Take 1 tablet (5 mg total) by mouth once daily. Z94.0/Kidney transplant on 11/26/2016 90 tablet 3    sodium bicarbonate 650 MG tablet Take 1 tablet (650 mg total) by mouth 2 (two) times daily. 540 tablet 3    tacrolimus (PROGRAF) 1 MG Cap Take 3 capsules (3 mg total) by mouth every morning AND 2 capsules (2 mg total) every evening. Z94.0/Kidney Transplant on 11/26/16. 450 capsule 3     No current facility-administered medications on file prior to visit.        Anticoagulation:  Yes ASA 81 mg, last taken today - will need to hold from now until surgery    Physical Exam:  Estimated body mass index is 21.46 kg/m² as calculated from the following:    Height as of 9/24/19: 5' 11" (1.803 m).    Weight as of 10/17/19: 69.8 kg (153 lb 14.1 oz).    General: No acute distress, well developed. AAOx3  Head: Normocephalic, Atraumatic  Eyes: Extra-occular movements intact, No discharge  Neck: supple, symmetrical, trachea midline  Lungs: normal respiratory effort, no respiratory distress, no wheezes  CV: regular rate, 2+ pulses  Abdomen: soft, non-tender, non-distended, no organomegaly  : circumcised penis; testes descended bilaterally; numerous epididymal cysts bilaterally; spermatic cords normal " bilaterally  MSK: no edema, no deformities, normal ROM  Skin: skin color, texture, turgor normal.  Neurologic: no focal deficits, sensation intact    Labs:    Urine dipstick today - negative for nitrites, blood, and LE    Lab Results   Component Value Date    WBC 7.26 10/28/2019    HGB 13.7 (L) 10/28/2019    HCT 43.7 10/28/2019    MCV 97 10/28/2019     10/28/2019           BMP  Lab Results   Component Value Date     10/28/2019    K 4.5 10/28/2019     10/28/2019    CO2 27 10/28/2019    BUN 21 10/28/2019    CREATININE 1.5 (H) 10/28/2019    CALCIUM 10.3 10/28/2019    ANIONGAP 7 (L) 10/28/2019    ESTGFRAFRICA 54.5 (A) 10/28/2019    EGFRNONAA 47.2 (A) 10/28/2019       Lab Results   Component Value Date    PSA 0.41 10/18/2016    PSA 0.32 10/20/2015    PSA 0.45 11/04/2014       Imaging:     Retrograde urethrogram 10/15/19 - 1.3 cm proximal urethral stricture    Assessment: Alin Burkett is a 69 y.o. male with urethral stricture    Plan:     1. To OR on 11/6/19 for urethroplasty with buccal mucosa graft and flexible cystoscopy  2. Consents signed   3. I have explained the risk, benefits, and alternatives of the procedure in detail. The patient voices understanding and all questions have been answered. The patient agrees to proceed as planned.   4. Patient has appointment with ENT today (Dr. Cardoza) to discuss procurement of buccal mucosa graft. Appreciate the assistance.  5. Patient has appointment with cardiology today for EKG. His outpatient cardiologist is Dr. Antwon Simons.  6. Patient will need preop clearance from anesthesia.  7. Urine culture today.    Omar Bunn MD

## 2019-10-30 NOTE — TELEPHONE ENCOUNTER
Called patient to inform him that a surgery clearance  was needed and recommended by the Cardiologist and the transplant team before his surgery on 11/6 patient refused for me to schedule the appointment and stated he would take care of it because no one told him over there he needed this

## 2019-10-30 NOTE — H&P (VIEW-ONLY)
[facility] [place]  Staff: Dewey Mann MD    Is this patient in a research study? No    CC: urethral stricture    HPI:  Alin Burkett is a 69 y.o. male with a PMH of congenital urethral stricture, s/p transplant kidney, ED.  C/o weak urine flow.  C/o ED.  Not responded well to viagra in the past.    s/p cysto and urethral dilation for anterior urethral stricture on 12/17.  Bladder diverticulum was also seen on cysto.     He had the following procedure on 12/20/18 with Dr. Mann:  Procedure(s) Performed:   1.  Cystoscopy with DVIU  2.  Urethral dilation   Findings:   - tight stricture in pendulous urethra about 3 cm in length, dilated from 10 - 22 Fr with Heymen dilators. Urethrotome used to open strictured area.  - Large mouth bladder diverticula with multiple outpouchings     He has learned how to do CIC.  However, he noticed that following CIC, he experienced some purulent drainage coming out of his urethra.  Thus he stopped doing CIC.   He reports some discomfort at the tip of his penis.  He has a history of recurrent UTIs.    His most recent urine cultures are proteus, enterococcus, Klebsiella, and coag-negative staph.  Most recent urine culture showed no growth. His only symptom is small degree of burning before he urinates whenever his urine cultures are positive.    He has a history of self-cath with 16 Fr catheter occasionally but has not does this in several months, as he get UTIs when he does this.   His stream is good most of the time, but he reports occasional trickling. He also occasionally passes some debris that may be stones.     He had a kidney transplant in 11/ 2016     He has a significant cardiac history. Underwent ablation for SVT in 08/2019. His cardiologist is Dr. Antwon Simons, in [place]. He has an appointment scheduled with Cardiology later today; will need cardiology clearance. On ASA 81 mg.     ROS:  Neg except per HPI    Past Medical History:   Diagnosis Date    Acidosis     Adrenal  adenoma     Anemia associated with chronic renal failure     Arrhythmia, onset 2015    Awaiting organ transplant status 2013    Basal cell carcinoma 2012    left nasal tip    Blood type B+ 2013    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  Renal allograft biopsy 17 (DIVINE): 21 glomeruli, none globally sclerosed, <5% interstitial fibrosis, no ACR, c4d negative, AVR CCT Type 2 (V1 lesion); plan THYMO     Dissecting aortic aneurysm (any part), abdominal     Diverticulosis     Encounter for blood transfusion     ESRD (end stage renal disease) 2010    H/O urethral stricture 2018    H/O: urethral stricture     History of AAA (abdominal aortic aneurysm) repair     History of urethral stricture 2018    Hypertension     Hypokalemia     Hypothyroidism 1/10/2014    Inguinal hernia bilateral, non-recurrent     Kidney stones     Organ transplant candidate 2013    Plantar warts 1/10/2014    Recurrent UTI 2017    S/P kidney transplant     Secondary hyperparathyroidism, renal     Thyroid disease        Past Surgical History:   Procedure Laterality Date    ABDOMINAL SURGERY      exploratory lapatomy x 2    ABLATION N/A 2019    Procedure: ABLATION, SVT;  Surgeon: Emerson Mcmanus MD;  Location: [facility] EP LAB;  Service: Cardiology;  Laterality: N/A;  SVT, RFA, CARTO, anes, GP, 323    AORTA - SUPERIOR MESENTERIC ARTERY BYPASS GRAFT      BLADDER NECK RECONSTRUCTION      BLADDER SURGERY      COLONOSCOPY  10/10/2013    Dr. Gutierrez, repeat in 5 years    CYSTOSCOPY      CYSTOSCOPY N/A 2018    Procedure: CYSTOSCOPY;  Surgeon: Dewey Mann MD;  [facility];  Service:  Urology;  Laterality: N/A;  45 min    CYSTOURETHROSCOPY WITH DIRECT VISION INTERNAL URETHROTOMY N/A 2018    Procedure: CYSTOSCOPY, WITH DIRECT VISION INTERNAL URETHROTOMY;  Surgeon: Dewey Mann MD;  Location: [address];  Service: Urology;  Laterality: N/A;    DILATION OF URETHRA N/A 2018    Procedure: DILATION, URETHRA;  Surgeon: Dewey Mann MD;  Location: [address];  Service: Urology;  Laterality: N/A;    GASTROJEJUNOSTOMY      HEMORRHOID SURGERY      HERNIA REPAIR      KIDNEY TRANSPLANT      LEFT HEART CATHETERIZATION Left 2019    Procedure: Left heart cath;  Surgeon: Javan Oscar MD;  Location: [facility] CATH/EP LAB;  Service: Cardiology;  Laterality: Left;    LITHOTRIPSY      PERCUTANEOUS NEPHROLITHOTRIPSY      right  ESWL  10/31/12    right ESWL  12       Social History     Socioeconomic History    Marital status: Significant Other     Spouse name: Not on file    Number of children: Not on file    Years of education: Not on file    Highest education level: Not on file   Occupational History     Employer: Disabled   Social Needs    Financial resource strain: Not on file    Food insecurity:     Worry: Not on file     Inability: Not on file    Transportation needs:     Medical: Not on file     Non-medical: Not on file   Tobacco Use    Smoking status: Former Smoker     Years: 40.00     Types: Cigarettes     Last attempt to quit: 2010     Years since quittin.3    Smokeless tobacco: Never Used   Substance and Sexual Activity    Alcohol use: No     Comment: stopped ETOH in     Drug use: No     Comment: THC in youth    Sexual activity: Yes     Partners: Female     Birth control/protection: None   Lifestyle    Physical activity:     Days per week: Not on file     Minutes per session: Not on file    Stress: Not on file   Relationships    Social connections:     Talks on phone: Not on file     Gets together: Not on file     Attends Religion  service: Not on file     Active member of club or organization: Not on file     Attends meetings of clubs or organizations: Not on file     Relationship status: Not on file   Other Topics Concern    Not on file   Social History Narrative    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       Family History   Problem Relation Age of Onset    Diabetes Mother     Alzheimer's disease Mother     Alcohol abuse Father     HIV Brother     Stroke Maternal Aunt     Kidney disease Paternal Uncle     Kidney disease Cousin     No Known Problems Sister     No Known Problems Daughter     No Known Problems Sister     No Known Problems Brother     No Known Problems Brother     Cancer Brother         thyroid cancer    Melanoma Neg Hx     Psoriasis Neg Hx     Lupus Neg Hx     Eczema Neg Hx     Colon cancer Neg Hx     Colon polyps Neg Hx     Crohn's disease Neg Hx     Ulcerative colitis Neg Hx     Celiac disease Neg Hx        Review of patient's allergies indicates:  No Known Allergies    Current Outpatient Medications on File Prior to Visit   Medication Sig Dispense Refill    aspirin (ECOTRIN) 81 MG EC tablet Take 1 tablet (81 mg total) by mouth once daily.  0    calcitRIOL (ROCALTROL) 0.25 MCG Cap Take 1 capsule (0.25 mcg total) by mouth once daily. 30 capsule 11    famotidine (PEPCID) 20 MG tablet Take 1 tablet (20 mg total) by mouth every evening. 90 tablet 3    FLUAD 4542-0422, 65 YR UP,,PF, 45 mcg (15 mcg x 3)/0.5 mL Syrg PHARMACIST ADMINISTERED IMMUNIZATION ADMINISTERED AT TIME OF DISPENSING  0    ketoconazole (NIZORAL) 200 mg Tab Take 0.5 tablets (100 mg total) by mouth once daily. 45 tablet 3    levoFLOXacin (LEVAQUIN) 750 MG tablet Take 1 tablet (750 mg total) by mouth every other day. 5 tablet 0    levothyroxine (SYNTHROID) 100 MCG tablet Take 1 tablet (100 mcg total) by mouth once daily. 90 tablet 3    magnesium oxide (MAG-OX) 400 mg (241.3 mg magnesium) tablet Take 1 tablet (400 mg total)  "by mouth once daily. 90 tablet 3    metoprolol tartrate (LOPRESSOR) 25 MG tablet Take 0.5 tablets (12.5 mg total) by mouth 2 (two) times daily. 90 tablet 3    multivitamin (ONE DAILY MULTIVITAMIN) per tablet Take 1 tablet by mouth once daily.      mycophenolate (CELLCEPT) 250 mg Cap Take 3 capsules (750 mg total) by mouth 2 (two) times daily. Z94.0/Kidney transplant on 11/26/16 180 capsule 11    mycophenolate (CELLCEPT) 250 mg Cap       pep injection Inject 0.25 ml as directed.   May increase by 0.05 ml such as 0.30, 0.35 ml etc. Up to 1.00 ml Until adequate result is achieved.    For compounding pharmacy use:   Add PAPAVERINE 30 mcg  Add PHENTOLAMINE 10 mg  Add ALPROSTADIL 100 mcg 1 vial 2    predniSONE (DELTASONE) 5 MG tablet Take 1 tablet (5 mg total) by mouth once daily. Z94.0/Kidney transplant on 11/26/2016 90 tablet 3    sodium bicarbonate 650 MG tablet Take 1 tablet (650 mg total) by mouth 2 (two) times daily. 540 tablet 3    tacrolimus (PROGRAF) 1 MG Cap Take 3 capsules (3 mg total) by mouth every morning AND 2 capsules (2 mg total) every evening. Z94.0/Kidney Transplant on 11/26/16. 450 capsule 3     No current facility-administered medications on file prior to visit.        Anticoagulation:  Yes ASA 81 mg, last taken today - will need to hold from now until surgery    Physical Exam:  Estimated body mass index is 21.46 kg/m² as calculated from the following:    Height as of 9/24/19: 5' 11" (1.803 m).    Weight as of 10/17/19: 69.8 kg (153 lb 14.1 oz).    General: No acute distress, well developed. AAOx3  Head: Normocephalic, Atraumatic  Eyes: Extra-occular movements intact, No discharge  Neck: supple, symmetrical, trachea midline  Lungs: normal respiratory effort, no respiratory distress, no wheezes  CV: regular rate, 2+ pulses  Abdomen: soft, non-tender, non-distended, no organomegaly  : circumcised penis; testes descended bilaterally; numerous epididymal cysts bilaterally; spermatic cords normal " bilaterally  MSK: no edema, no deformities, normal ROM  Skin: skin color, texture, turgor normal.  Neurologic: no focal deficits, sensation intact    Labs:    Urine dipstick today - negative for nitrites, blood, and LE    Lab Results   Component Value Date    WBC 7.26 10/28/2019    HGB 13.7 (L) 10/28/2019    HCT 43.7 10/28/2019    MCV 97 10/28/2019     10/28/2019           BMP  Lab Results   Component Value Date     10/28/2019    K 4.5 10/28/2019     10/28/2019    CO2 27 10/28/2019    BUN 21 10/28/2019    CREATININE 1.5 (H) 10/28/2019    CALCIUM 10.3 10/28/2019    ANIONGAP 7 (L) 10/28/2019    ESTGFRAFRICA 54.5 (A) 10/28/2019    EGFRNONAA 47.2 (A) 10/28/2019       Lab Results   Component Value Date    PSA 0.41 10/18/2016    PSA 0.32 10/20/2015    PSA 0.45 11/04/2014       Imaging:     Retrograde urethrogram 10/15/19 - 1.3 cm proximal urethral stricture    Assessment: Alin Burkett is a 69 y.o. male with urethral stricture    Plan:     1. To OR on 11/6/19 for urethroplasty with buccal mucosa graft and flexible cystoscopy  2. Consents signed   3. I have explained the risk, benefits, and alternatives of the procedure in detail. The patient voices understanding and all questions have been answered. The patient agrees to proceed as planned.   4. Patient has appointment with ENT today (Dr. Cardoza) to discuss procurement of buccal mucosa graft. Appreciate the assistance.  5. Patient has appointment with cardiology today for EKG. His outpatient cardiologist is Dr. Antwon Simons.  6. Patient will need preop clearance from anesthesia.  7. Urine culture today.    Omar Bunn MD

## 2019-10-30 NOTE — PROGRESS NOTES
Chief Complaint   Patient presents with    consult/ buccal mucosa graft consult       HPI   69 y.o. male presents in anticipation of urethroplasty with possible buccal mucosa graft next week.  He has no head neck complaints.  He is a former smoker.  He has no known history of oral surgery.    Review of Systems   Constitutional: Negative for fatigue and unexpected weight change.   HENT: Per HPI.  Eyes: Negative for visual disturbance.   Respiratory: Negative for shortness of breath, hemoptysis   Cardiovascular: Negative for chest pain and palpitations.   Musculoskeletal: Negative for decreased ROM, back pain.   Skin: Negative for rash, sunburn, itching.   Neurological: Negative for dizziness and seizures.   Hematological: Negative for adenopathy. Does not bruise/bleed easily.   Endocrine: Negative for rapid weight loss/weight gain, heat/cold intolerance.     Past Medical History   Patient Active Problem List   Diagnosis    Adrenal adenoma    Essential hypertension    Congenital absence of kidney    Anemia of chronic disease    Secondary hyperparathyroidism, renal    Hypothyroidism    Plantar warts    Arrhythmia, onset     History of smoking 10-25 pack years, quit , 20 pack-years    History of alcohol abuse, quit     LOUISA (obstructive sleep apnea), CPAP refused    LVH (left ventricular hypertrophy) due to hypertensive disease    Acute coronary syndrome    BPH (benign prostatic hyperplasia)    Long-term use of immunosuppressant medication    -donor kidney transplant    Hypophosphatemia    Stage 3 chronic kidney disease    Urethral stricture    Recurrent UTI    Bladder diverticulum    Thrombocytopenia    Abdominal aortic atherosclerosis    Abdominal aortic aneurysm (AAA) without rupture    SVT (supraventricular tachycardia)    AMI (acute myocardial infarction)    Elevated troponin    UTI due to Klebsiella species    C. difficile diarrhea           Past Surgical History    Past Surgical History:   Procedure Laterality Date    ABDOMINAL SURGERY      exploratory lapatomy x 2    ABLATION N/A 8/22/2019    Procedure: ABLATION, SVT;  Surgeon: Emerson Mcmanus MD;  Location: [facility] EP LAB;  Service: Cardiology;  Laterality: N/A;  SVT, RFA, CARTO, anes, GP, 323    AORTA - SUPERIOR MESENTERIC ARTERY BYPASS GRAFT      BLADDER NECK RECONSTRUCTION      BLADDER SURGERY      COLONOSCOPY  10/10/2013    Dr. Gutierrez, repeat in 5 years    CYSTOSCOPY      CYSTOSCOPY N/A 12/19/2018    Procedure: CYSTOSCOPY;  Surgeon: Dewey Mann MD;  Location: [facility] OR [address] [place] [address];  Service: Urology;  Laterality: N/A;  45 min    CYSTOURETHROSCOPY WITH DIRECT VISION INTERNAL URETHROTOMY N/A 12/19/2018    Procedure: CYSTOSCOPY, WITH DIRECT VISION INTERNAL URETHROTOMY;  Surgeon: Dewey Mann MD;  Location: [facility] OR [address] [place] [address];  Service: Urology;  Laterality: N/A;    DILATION OF URETHRA N/A 12/19/2018    Procedure: DILATION, URETHRA;  Surgeon: Dewey Mann MD;  Location: [address];  Service: Urology;  Laterality: N/A;    GASTROJEJUNOSTOMY      HEMORRHOID SURGERY      HERNIA REPAIR      KIDNEY TRANSPLANT      LEFT HEART CATHETERIZATION Left 8/20/2019    Procedure: Left heart cath;  Surgeon: Javan Oscar MD;  Location: [facility] CATH/EP LAB;  Service: Cardiology;  Laterality: Left;    LITHOTRIPSY      PERCUTANEOUS NEPHROLITHOTRIPSY      right  ESWL  10/31/12    right ESWL  6/26/12         Family History   Family History   Problem Relation Age of Onset    Diabetes Mother     Alzheimer's disease Mother     Alcohol abuse Father     HIV Brother     Stroke Maternal Aunt     Kidney disease Paternal Uncle     Kidney disease Cousin     No Known Problems Sister     No Known Problems Daughter     No Known Problems Sister     No Known Problems Brother     No Known Problems Brother     Cancer Brother         thyroid cancer    Melanoma Neg Hx     Psoriasis Neg Hx     Lupus Neg Hx     Eczema Neg Hx      Colon cancer Neg Hx     Colon polyps Neg Hx     Crohn's disease Neg Hx     Ulcerative colitis Neg Hx     Celiac disease Neg Hx            Social History   .  Social History     Socioeconomic History    Marital status: Significant Other     Spouse name: Not on file    Number of children: Not on file    Years of education: Not on file    Highest education level: Not on file   Occupational History     Employer: Disabled   Social Needs    Financial resource strain: Not on file    Food insecurity:     Worry: Not on file     Inability: Not on file    Transportation needs:     Medical: Not on file     Non-medical: Not on file   Tobacco Use    Smoking status: Former Smoker     Years: 40.00     Types: Cigarettes     Last attempt to quit: 2010     Years since quittin.3    Smokeless tobacco: Never Used   Substance and Sexual Activity    Alcohol use: No     Comment: stopped ETOH in     Drug use: No     Comment: THC in youth    Sexual activity: Yes     Partners: Female     Birth control/protection: None   Lifestyle    Physical activity:     Days per week: Not on file     Minutes per session: Not on file    Stress: Not on file   Relationships    Social connections:     Talks on phone: Not on file     Gets together: Not on file     Attends Uatsdin service: Not on file     Active member of club or organization: Not on file     Attends meetings of clubs or organizations: Not on file     Relationship status: Not on file   Other Topics Concern    Not on file   Social History Narrative    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      General: AOx3, NAD   Respiratory:  Symmetric chest rise, normal effort  Oral Cavity:  Oral Tongue mobile, no lesions noted. Hard Palate WNL.  No buccal or FOM lesions.  Oropharynx:  No masses/lesions of the posterior pharyngeal wall. Tonsillar fossa without lesions. Soft palate without masses. Midline uvula.   Neck: No scars.  No cervical lymphadenopathy, thyromegaly or thyroid nodules.  Normal range of motion.    Face: House Brackmann I bilaterally.     Assessment/Plan  Problem List Items Addressed This Visit        Other    Stricture of anterior urethra in male - Primary     Will be available to perform buccal mucosa graft if necessary.

## 2019-10-30 NOTE — LETTER
October 30, 2019      Dewey Mann MD  [address]  [facility] [address]           [facility] - Head/Neck S[facility] On[facility]  [address]  [facility] [address]  Phone: [phone]  Fax: [phone]          Patient: Alin Burkett   MR Number: [id number]   YOB: 1950   Date of Visit: 10/30/2019       Dear Dr. Dewey Mann:    Thank you for referring Alin Burkett to me for evaluation. Attached you will find relevant portions of my assessment and plan of care.    If you have questions, please do not hesitate to call me. I look forward to following Alin Burkett along with you.    Sincerely,    Mahesh Cardoza MD    Enclosure  CC:  No Recipients    If you would like to receive this communication electronically, please contact [email or web address] or [phone] to request more information on [employer] access.    For providers and/or their staff who would like to refer a patient to [facility], please contact us through our one-stop-shop provider referral line, [facility], at [phone].    If you feel you have received this communication in error or would no longer like to receive these types of communications, please e-mail [email or web address]

## 2019-10-31 ENCOUNTER — HOSPITAL ENCOUNTER (OUTPATIENT)
Dept: PREADMISSION TESTING | Facility: HOSPITAL | Age: 69
Discharge: HOME | End: 2019-10-31
Attending: ANESTHESIOLOGY
Payer: MEDICARE

## 2019-10-31 VITALS
RESPIRATION RATE: 18 BRPM | DIASTOLIC BLOOD PRESSURE: 87 MMHG | OXYGEN SATURATION: 99 % | SYSTOLIC BLOOD PRESSURE: 130 MMHG | HEIGHT: 71 IN | BODY MASS INDEX: 21.28 KG/M2 | HEART RATE: 60 BPM | WEIGHT: 152 LBS | TEMPERATURE: 98 F

## 2019-10-31 LAB — BACTERIA UR CULT: ABNORMAL

## 2019-10-31 NOTE — DISCHARGE INSTRUCTIONS
Your surgery has been scheduled for:  Wednesday, November 6, 2019    You should report to:    ____[facility], located on the [facility] side of the            Second floor of the [facility] ([phone])    Please Note   - Tell your doctor if you take Aspirin, products containing Aspirin, herbal medications or blood thinners, such as Coumadin, Ticlid, or Plavix.  (Consult your provider regarding holding or stopping before surgery).  - Arrange for someone to drive you home following surgery.  You will not be allowed to leave the surgical facility alone or drive yourself home following sedation and anesthesia.    Before Surgery  - Stop taking all vitamins/ herbal medications 7 days prior to surgery  - No Motrin/Advil (Ibuprofen) 7 days before surgery  - No Aleve (Naproxen) 7 days before surgery  - Stop taking Aspirin, products containing Aspirin 7 days before surgery  - Stop taking blood thinners_______days before surgery  - No Goody's/BC  Powder 7 days before surgery  - Refrain from drinking alcoholic beverages for 24 hours before and after surgery  - Stop or limit smoking 14 days before surgery  - You may take Tylenol for pain    Night before Surgery   Stop ALL solid food, gum, candy (including vitamins) 8 hours before arrival time.  (Please note: If your surgeon gives you different eating and drinking instructions, please follow surgeon's directions.)   Stop all CLOUDY liquids: coffee with creamer, formula, tube feeds, cloudy juices, non-human milk and breast milk with additives, 6 hours prior to arrival time.   Stop plain breast milk 4 hours prior to arrival time.   The patient should be ENCOURAGED to drink carbohydrate-rich clear liquids (sports drinks, clear juices) until 2 hours prior to arrival time.   CLEAR liquids include only water, black coffee NO creamer, clear oral rehydration drinks, clear sports drinks or clear fruit juices (no orange juice, no pulpy  juices, no apple cider). Advise patients if they can read newsprint through the liquid, it qualifies as clear liquid.    IF IN DOUBT, drink water instead.   - Take a shower or bath (shower is recommended).  Bathe with Hibiclens soap or an antibacterial soap from the neck down.  If not supplied by your surgeon, hibiclens soap will need to be purchased over the counter in pharmacy.  Rinse soap off thoroughly.  - Shampoo your hair with your regular shampoo    The Day of Surgery    NOTHING TO  DRINK 2 hours before arrival time. If you are told to take medication on the morning of surgery, it may be taken with a sip of water.   - Take another bath or shower with hibiclens or any antibacterial soap, to reduce the chance of infection.  - Take heart and blood pressure medications with a small sip of water, as advised by the perioperative team.  - Do not take fluid pills  - You may brush your teeth and rinse your mouth, but do not swallow any additional water.   - Do not apply perfumes, powder, body lotions or deodorant on the day of surgery.  - Nail polish should be removed.  - Do not wear makeup or moisturizer.  - Wear comfortable clothes, such as a button front shirt and loose fitting pants.  - Leave all jewelry, including body piercings, and valuables at home.    - Bring any devices you will neeed after surgery such as crutches or canes.  - If you have sleep apnea, please bring your CPAP machine.    In the event that your physical condition changes including the onset of a cold or respiratory illness, or if you have to delay or cancel your surgery, please notify your surgeon.    Anesthesia: General Anesthesia     You are watched continuously during your procedure by your anesthesia provider.     Youre due to have surgery. During surgery, youll be given medicine called anesthesia or anesthetic. This will keep you comfortable and pain-free. Your anesthesia provider will use general anesthesia.  What is general  anesthesia?  General anesthesia puts you into a state like deep sleep. It goes into the bloodstream (IV anesthetics), into the lungs (gas anesthetics), or both. You feel nothing during the procedure. You will not remember it. During the procedure, the anesthesia provider monitors you continuously. He or she checks your heart rate and rhythm, blood pressure, breathing, and blood oxygen.  · IV anesthetics. IV anesthetics are given through an IV line in your arm. Theyre often given first. This is so you are asleep before a gas anesthetic is started. Some kinds of IV anesthetics relieve pain. Others relax you. Your doctor will decide which kind is best in your case.  · Gas anesthetics. Gas anesthetics are breathed into the lungs. They are often used to keep you asleep. They can be given through a facemask or a tube placed in your larynx or trachea (breathing tube).  ? If you have a facemask, your anesthesia provider will most likely place it over your nose and mouth while youre still awake. Youll breathe oxygen through the mask as your IV anesthetic is started. Gas anesthetic may be added through the mask.  ? If you have a tube in the larynx or trachea, it will be inserted into your throat after youre asleep.  Anesthesia tools and medicines  You will likely have:  · IV anesthetics. These are put into an IV line into your bloodstream.  · Gas anesthetics. You breathe these anesthetics into your lungs, where they pass into your bloodstream.  · Pulse oximeter. This is a small clip that is attached to the end of your finger. This measures your blood oxygen level.  · Electrocardiography leads (electrodes). These are small sticky pads that are placed on your chest. They record your heart rate and rhythm.  · Blood pressure cuff. This reads your blood pressure.  Risks and possible complications  General anesthesia has some risks. These include:  · Breathing problems  · Nausea and vomiting  · Sore throat or hoarseness  (usually temporary)  · Allergic reaction to the anesthetic  · Irregular heartbeat (rare)  · Cardiac arrest (rare)   Anesthesia safety  · Follow all instructions you are given for how long not to eat or drink before your procedure.  · Be sure your doctor knows what medicines and drugs you take. This includes over-the-counter medicines, herbs, supplements, alcohol or other drugs. You will be asked when those were last taken.  · Have an adult family member or friend drive you home after the procedure.  · For the first 24 hours after your surgery:  ? Do not drive or use heavy equipment.  ? Do not make important decisions or sign legal documents. If important decisions or signing legal documents is necessary during the first 24 hours after surgery, have a trusted family member or spouse act on your behalf.  ? Avoid alcohol.  ? Have a responsible adult stay with you. He or she can watch for problems and help keep you safe.  Date Last Reviewed: 12/1/2016  © 8880-1085 [employer]. [address] [place] [address], [place] [address]. All rights reserved. This information is not intended as a substitute for professional medical care. Always follow your healthcare professional's instructions

## 2019-11-01 ENCOUNTER — TELEPHONE (OUTPATIENT)
Dept: UROLOGY | Facility: CLINIC | Age: 69
End: 2019-11-01

## 2019-11-01 DIAGNOSIS — N30.00 ACUTE CYSTITIS WITHOUT HEMATURIA: Primary | ICD-10-CM

## 2019-11-01 RX ORDER — AMOXICILLIN AND CLAVULANATE POTASSIUM 875; 125 MG/1; MG/1
1 TABLET, FILM COATED ORAL 2 TIMES DAILY
Qty: 14 TABLET | Refills: 0 | Status: CANCELLED | OUTPATIENT
Start: 2019-11-01 | End: 2019-01-01

## 2019-11-01 RX ORDER — AMOXICILLIN AND CLAVULANATE POTASSIUM 875; 125 MG/1; MG/1
1 TABLET, FILM COATED ORAL 2 TIMES DAILY
Qty: 14 TABLET | Refills: 0 | Status: ON HOLD | OUTPATIENT
Start: 2019-11-01 | End: 2019-11-06 | Stop reason: HOSPADM

## 2019-11-01 NOTE — TELEPHONE ENCOUNTER
----- Message from Rebeca Berman LPN sent at 11/1/2019  1:43 PM CDT -----  Contact: pt    I didn't call, did you? If not ok to delete   ----- Message -----  From: Natalee Candelario  Sent: 11/1/2019   1:28 PM CDT  To: Johnathan SANCHES Staff    Rebeca, pt is calling to see if you called for him today.  If so you can call back, [phone]

## 2019-11-01 NOTE — TELEPHONE ENCOUNTER
Acute cystitis without hematuria  -     amoxicillin-clavulanate 875-125mg (AUGMENTIN) 875-125 mg per tablet; Take 1 tablet by mouth 2 (two) times daily. for 7 days  Dispense: 14 tablet; Refill: 0    please have him start Augmentin to keep his urine sterile prior to his urethroplasty next week.

## 2019-11-05 ENCOUNTER — TELEPHONE (OUTPATIENT)
Dept: UROLOGY | Facility: CLINIC | Age: 69
End: 2019-11-05

## 2019-11-05 ENCOUNTER — LAB VISIT (OUTPATIENT)
Dept: LAB | Facility: HOSPITAL | Age: 69
End: 2019-11-05
Attending: INTERNAL MEDICINE
Payer: MEDICARE

## 2019-11-05 ENCOUNTER — PATIENT MESSAGE (OUTPATIENT)
Dept: TRANSPLANT | Facility: CLINIC | Age: 69
End: 2019-11-05

## 2019-11-05 DIAGNOSIS — Z94.0 KIDNEY REPLACED BY TRANSPLANT: ICD-10-CM

## 2019-11-05 DIAGNOSIS — T86.10 COMPLICATION OF TRANSPLANTED KIDNEY, UNSPECIFIED COMPLICATION: ICD-10-CM

## 2019-11-05 LAB
ALBUMIN SERPL BCP-MCNC: 3.8 G/DL (ref 3.5–5.2)
ALBUMIN SERPL BCP-MCNC: 3.8 G/DL (ref 3.5–5.2)
ALP SERPL-CCNC: 46 U/L (ref 55–135)
ALT SERPL W/O P-5'-P-CCNC: 26 U/L (ref 10–44)
ANION GAP SERPL CALC-SCNC: 6 MMOL/L (ref 8–16)
AST SERPL-CCNC: 33 U/L (ref 10–40)
BASOPHILS # BLD AUTO: 0.02 K/UL (ref 0–0.2)
BASOPHILS NFR BLD: 0.5 % (ref 0–1.9)
BILIRUB DIRECT SERPL-MCNC: 0.3 MG/DL (ref 0.1–0.3)
BILIRUB SERPL-MCNC: 0.7 MG/DL (ref 0.1–1)
BUN SERPL-MCNC: 24 MG/DL (ref 8–23)
CALCIUM SERPL-MCNC: 9.3 MG/DL (ref 8.7–10.5)
CHLORIDE SERPL-SCNC: 112 MMOL/L (ref 95–110)
CO2 SERPL-SCNC: 24 MMOL/L (ref 23–29)
CREAT SERPL-MCNC: 1.7 MG/DL (ref 0.5–1.4)
DIFFERENTIAL METHOD: ABNORMAL
EOSINOPHIL # BLD AUTO: 0.1 K/UL (ref 0–0.5)
EOSINOPHIL NFR BLD: 2.1 % (ref 0–8)
ERYTHROCYTE [DISTWIDTH] IN BLOOD BY AUTOMATED COUNT: 14.5 % (ref 11.5–14.5)
EST. GFR  (AFRICAN AMERICAN): 46.9 ML/MIN/1.73 M^2
EST. GFR  (NON AFRICAN AMERICAN): 40.5 ML/MIN/1.73 M^2
GLUCOSE SERPL-MCNC: 77 MG/DL (ref 70–110)
HCT VFR BLD AUTO: 42.7 % (ref 40–54)
HGB BLD-MCNC: 13 G/DL (ref 14–18)
IMM GRANULOCYTES # BLD AUTO: 0.01 K/UL (ref 0–0.04)
IMM GRANULOCYTES NFR BLD AUTO: 0.2 % (ref 0–0.5)
LYMPHOCYTES # BLD AUTO: 0.7 K/UL (ref 1–4.8)
LYMPHOCYTES NFR BLD: 15.7 % (ref 18–48)
MAGNESIUM SERPL-MCNC: 2 MG/DL (ref 1.6–2.6)
MCH RBC QN AUTO: 30 PG (ref 27–31)
MCHC RBC AUTO-ENTMCNC: 30.4 G/DL (ref 32–36)
MCV RBC AUTO: 99 FL (ref 82–98)
MONOCYTES # BLD AUTO: 0.4 K/UL (ref 0.3–1)
MONOCYTES NFR BLD: 10 % (ref 4–15)
NEUTROPHILS # BLD AUTO: 3.1 K/UL (ref 1.8–7.7)
NEUTROPHILS NFR BLD: 71.5 % (ref 38–73)
NRBC BLD-RTO: 0 /100 WBC
PHOSPHATE SERPL-MCNC: 3 MG/DL (ref 2.7–4.5)
PLATELET # BLD AUTO: 139 K/UL (ref 150–350)
PMV BLD AUTO: 11.9 FL (ref 9.2–12.9)
POTASSIUM SERPL-SCNC: 3.6 MMOL/L (ref 3.5–5.1)
PROT SERPL-MCNC: 6.8 G/DL (ref 6–8.4)
PTH-INTACT SERPL-MCNC: 248 PG/ML (ref 9–77)
RBC # BLD AUTO: 4.33 M/UL (ref 4.6–6.2)
SODIUM SERPL-SCNC: 142 MMOL/L (ref 136–145)
WBC # BLD AUTO: 4.28 K/UL (ref 3.9–12.7)

## 2019-11-05 PROCEDURE — 36415 COLL VENOUS BLD VENIPUNCTURE: CPT | Mod: PO

## 2019-11-05 PROCEDURE — 80069 RENAL FUNCTION PANEL: CPT

## 2019-11-05 PROCEDURE — 83735 ASSAY OF MAGNESIUM: CPT

## 2019-11-05 PROCEDURE — 84075 ASSAY ALKALINE PHOSPHATASE: CPT

## 2019-11-05 PROCEDURE — 87799 DETECT AGENT NOS DNA QUANT: CPT

## 2019-11-05 PROCEDURE — 84155 ASSAY OF PROTEIN SERUM: CPT

## 2019-11-05 PROCEDURE — 82247 BILIRUBIN TOTAL: CPT

## 2019-11-05 PROCEDURE — 83970 ASSAY OF PARATHORMONE: CPT

## 2019-11-05 PROCEDURE — 80197 ASSAY OF TACROLIMUS: CPT

## 2019-11-05 PROCEDURE — 85025 COMPLETE CBC W/AUTO DIFF WBC: CPT

## 2019-11-05 NOTE — TELEPHONE ENCOUNTER
Called pt to confirm 6am arrival time for procedure. Gave pt NPO instructions and gave pt opportunity to ask questions. Pt verbalized understanding.

## 2019-11-06 ENCOUNTER — ANESTHESIA (OUTPATIENT)
Dept: SURGERY | Facility: HOSPITAL | Age: 69
End: 2019-11-06
Payer: MEDICARE

## 2019-11-06 ENCOUNTER — HOSPITAL ENCOUNTER (OUTPATIENT)
Facility: HOSPITAL | Age: 69
LOS: 1 days | Discharge: HOME | End: 2019-11-06
Attending: UROLOGY | Admitting: UROLOGY
Payer: MEDICARE

## 2019-11-06 VITALS
DIASTOLIC BLOOD PRESSURE: 70 MMHG | SYSTOLIC BLOOD PRESSURE: 101 MMHG | HEIGHT: 71 IN | HEART RATE: 48 BPM | TEMPERATURE: 98 F | WEIGHT: 150 LBS | BODY MASS INDEX: 21 KG/M2 | RESPIRATION RATE: 12 BRPM | OXYGEN SATURATION: 98 %

## 2019-11-06 DIAGNOSIS — N99.114 POSTPROCEDURAL STRICTURE OF ANTERIOR URETHRA: Primary | ICD-10-CM

## 2019-11-06 DIAGNOSIS — N35.914 STRICTURE OF ANTERIOR URETHRA IN MALE, UNSPECIFIED STRICTURE TYPE: ICD-10-CM

## 2019-11-06 LAB
BKV DNA SERPL NAA+PROBE-ACNC: <125 COPIES/ML
BKV DNA SERPL NAA+PROBE-LOG#: <2.1 LOG (10) COPIES/ML
BKV DNA SERPL QL NAA+PROBE: NOT DETECTED
TACROLIMUS BLD-MCNC: 5.5 NG/ML (ref 5–15)

## 2019-11-06 PROCEDURE — 71000015 HC POSTOP RECOV 1ST HR: Performed by: UROLOGY

## 2019-11-06 PROCEDURE — 36000706: Performed by: UROLOGY

## 2019-11-06 PROCEDURE — D9220A PRA ANESTHESIA: Mod: CRNA,,, | Performed by: NURSE ANESTHETIST, CERTIFIED REGISTERED

## 2019-11-06 PROCEDURE — 88305 TISSUE SPECIMEN TO PATHOLOGY - SURGERY: ICD-10-PCS | Mod: 26,,, | Performed by: PATHOLOGY

## 2019-11-06 PROCEDURE — 88305 TISSUE EXAM BY PATHOLOGIST: CPT | Performed by: PATHOLOGY

## 2019-11-06 PROCEDURE — 53410 PR RECONSTRUC ANT MALE URETHRA: ICD-10-PCS | Mod: ,,, | Performed by: UROLOGY

## 2019-11-06 PROCEDURE — 71000016 HC POSTOP RECOV ADDL HR: Performed by: UROLOGY

## 2019-11-06 PROCEDURE — 25000003 PHARM REV CODE 250: Performed by: ANESTHESIOLOGY

## 2019-11-06 PROCEDURE — 63600175 PHARM REV CODE 636 W HCPCS: Performed by: NURSE ANESTHETIST, CERTIFIED REGISTERED

## 2019-11-06 PROCEDURE — 27201423 OPTIME MED/SURG SUP & DEVICES STERILE SUPPLY: Performed by: UROLOGY

## 2019-11-06 PROCEDURE — 63600175 PHARM REV CODE 636 W HCPCS: Performed by: STUDENT IN AN ORGANIZED HEALTH CARE EDUCATION/TRAINING PROGRAM

## 2019-11-06 PROCEDURE — 37000008 HC ANESTHESIA 1ST 15 MINUTES: Performed by: UROLOGY

## 2019-11-06 PROCEDURE — 71000033 HC RECOVERY, INTIAL HOUR: Performed by: UROLOGY

## 2019-11-06 PROCEDURE — 88305 TISSUE EXAM BY PATHOLOGIST: CPT | Mod: 26,,, | Performed by: PATHOLOGY

## 2019-11-06 PROCEDURE — 94761 N-INVAS EAR/PLS OXIMETRY MLT: CPT

## 2019-11-06 PROCEDURE — D9220A PRA ANESTHESIA: Mod: ANES,,, | Performed by: ANESTHESIOLOGY

## 2019-11-06 PROCEDURE — D9220A PRA ANESTHESIA: ICD-10-PCS | Mod: CRNA,,, | Performed by: NURSE ANESTHETIST, CERTIFIED REGISTERED

## 2019-11-06 PROCEDURE — D9220A PRA ANESTHESIA: ICD-10-PCS | Mod: ANES,,, | Performed by: ANESTHESIOLOGY

## 2019-11-06 PROCEDURE — 53410 RECONSTRUCTION OF URETHRA: CPT | Mod: ,,, | Performed by: UROLOGY

## 2019-11-06 PROCEDURE — 37000009 HC ANESTHESIA EA ADD 15 MINS: Performed by: UROLOGY

## 2019-11-06 PROCEDURE — 25000003 PHARM REV CODE 250: Performed by: UROLOGY

## 2019-11-06 PROCEDURE — S0028 INJECTION, FAMOTIDINE, 20 MG: HCPCS | Performed by: NURSE ANESTHETIST, CERTIFIED REGISTERED

## 2019-11-06 PROCEDURE — 25000003 PHARM REV CODE 250: Performed by: NURSE ANESTHETIST, CERTIFIED REGISTERED

## 2019-11-06 PROCEDURE — 36000707: Performed by: UROLOGY

## 2019-11-06 RX ORDER — BUPIVACAINE HYDROCHLORIDE 2.5 MG/ML
INJECTION, SOLUTION EPIDURAL; INFILTRATION; INTRACAUDAL
Status: DISCONTINUED | OUTPATIENT
Start: 2019-11-06 | End: 2019-11-06 | Stop reason: HOSPADM

## 2019-11-06 RX ORDER — NEOSTIGMINE METHYLSULFATE 0.5 MG/ML
INJECTION, SOLUTION INTRAVENOUS
Status: DISCONTINUED | OUTPATIENT
Start: 2019-11-06 | End: 2019-11-06

## 2019-11-06 RX ORDER — METOPROLOL TARTRATE 25 MG/1
25 TABLET, FILM COATED ORAL ONCE
Status: COMPLETED | OUTPATIENT
Start: 2019-11-06 | End: 2019-11-06

## 2019-11-06 RX ORDER — ONDANSETRON 2 MG/ML
INJECTION INTRAMUSCULAR; INTRAVENOUS
Status: DISCONTINUED | OUTPATIENT
Start: 2019-11-06 | End: 2019-11-06

## 2019-11-06 RX ORDER — CALCITRIOL 0.5 UG/1
0.5 CAPSULE ORAL DAILY
Qty: 30 CAPSULE | Refills: 11 | Status: SHIPPED | OUTPATIENT
Start: 2019-11-06

## 2019-11-06 RX ORDER — FAMOTIDINE 10 MG/ML
INJECTION INTRAVENOUS
Status: DISCONTINUED | OUTPATIENT
Start: 2019-11-06 | End: 2019-11-06

## 2019-11-06 RX ORDER — SODIUM CHLORIDE 9 MG/ML
INJECTION, SOLUTION INTRAVENOUS CONTINUOUS
Status: DISCONTINUED | OUTPATIENT
Start: 2019-11-06 | End: 2019-11-06 | Stop reason: HOSPADM

## 2019-11-06 RX ORDER — ACETAMINOPHEN 10 MG/ML
INJECTION, SOLUTION INTRAVENOUS
Status: DISCONTINUED | OUTPATIENT
Start: 2019-11-06 | End: 2019-11-06

## 2019-11-06 RX ORDER — LIDOCAINE HCL/PF 100 MG/5ML
SYRINGE (ML) INTRAVENOUS
Status: DISCONTINUED | OUTPATIENT
Start: 2019-11-06 | End: 2019-11-06

## 2019-11-06 RX ORDER — VASOPRESSIN 20 [USP'U]/ML
INJECTION, SOLUTION INTRAMUSCULAR; SUBCUTANEOUS
Status: DISCONTINUED | OUTPATIENT
Start: 2019-11-06 | End: 2019-11-06

## 2019-11-06 RX ORDER — CEPHALEXIN 500 MG/1
500 CAPSULE ORAL EVERY 12 HOURS
Qty: 42 CAPSULE | Refills: 0 | Status: SHIPPED | OUTPATIENT
Start: 2019-11-06 | End: 2019-01-01

## 2019-11-06 RX ORDER — OXYCODONE HYDROCHLORIDE 5 MG/1
5 TABLET ORAL EVERY 4 HOURS PRN
Status: DISCONTINUED | OUTPATIENT
Start: 2019-11-06 | End: 2019-11-06 | Stop reason: HOSPADM

## 2019-11-06 RX ORDER — GLYCOPYRROLATE 0.2 MG/ML
INJECTION INTRAMUSCULAR; INTRAVENOUS
Status: DISCONTINUED | OUTPATIENT
Start: 2019-11-06 | End: 2019-11-06

## 2019-11-06 RX ORDER — HYDROMORPHONE HYDROCHLORIDE 1 MG/ML
0.2 INJECTION, SOLUTION INTRAMUSCULAR; INTRAVENOUS; SUBCUTANEOUS EVERY 5 MIN PRN
Status: DISCONTINUED | OUTPATIENT
Start: 2019-11-06 | End: 2019-11-06 | Stop reason: HOSPADM

## 2019-11-06 RX ORDER — SODIUM CHLORIDE 0.9 % (FLUSH) 0.9 %
3 SYRINGE (ML) INJECTION
Status: DISCONTINUED | OUTPATIENT
Start: 2019-11-06 | End: 2019-11-06 | Stop reason: HOSPADM

## 2019-11-06 RX ORDER — FENTANYL CITRATE 50 UG/ML
INJECTION, SOLUTION INTRAMUSCULAR; INTRAVENOUS
Status: DISCONTINUED | OUTPATIENT
Start: 2019-11-06 | End: 2019-11-06

## 2019-11-06 RX ORDER — VANCOMYCIN HCL IN 5 % DEXTROSE 1G/250ML
1000 PLASTIC BAG, INJECTION (ML) INTRAVENOUS
Status: COMPLETED | OUTPATIENT
Start: 2019-11-06 | End: 2019-11-06

## 2019-11-06 RX ORDER — ROCURONIUM BROMIDE 10 MG/ML
INJECTION, SOLUTION INTRAVENOUS
Status: DISCONTINUED | OUTPATIENT
Start: 2019-11-06 | End: 2019-11-06

## 2019-11-06 RX ORDER — METHYLENE BLUE 10 MG/ML
INJECTION INTRAVENOUS
Status: DISCONTINUED | OUTPATIENT
Start: 2019-11-06 | End: 2019-11-06 | Stop reason: HOSPADM

## 2019-11-06 RX ORDER — PROPOFOL 10 MG/ML
VIAL (ML) INTRAVENOUS
Status: DISCONTINUED | OUTPATIENT
Start: 2019-11-06 | End: 2019-11-06

## 2019-11-06 RX ORDER — MIDAZOLAM HYDROCHLORIDE 1 MG/ML
INJECTION, SOLUTION INTRAMUSCULAR; INTRAVENOUS
Status: DISCONTINUED | OUTPATIENT
Start: 2019-11-06 | End: 2019-11-06

## 2019-11-06 RX ORDER — KETAMINE HCL IN 0.9 % NACL 50 MG/5 ML
SYRINGE (ML) INTRAVENOUS
Status: DISCONTINUED | OUTPATIENT
Start: 2019-11-06 | End: 2019-11-06

## 2019-11-06 RX ORDER — OXYCODONE HYDROCHLORIDE 5 MG/1
5 TABLET ORAL EVERY 6 HOURS PRN
Qty: 11 TABLET | Refills: 0 | Status: SHIPPED | OUTPATIENT
Start: 2019-11-06 | End: 2020-01-01 | Stop reason: CLARIF

## 2019-11-06 RX ADMIN — VASOPRESSIN 0.5 UNITS: 20 INJECTION INTRAVENOUS at 10:11

## 2019-11-06 RX ADMIN — VASOPRESSIN 0.25 UNITS: 20 INJECTION INTRAVENOUS at 09:11

## 2019-11-06 RX ADMIN — NEOSTIGMINE METHYLSULFATE 4 MG: 0.5 INJECTION INTRAVENOUS at 11:11

## 2019-11-06 RX ADMIN — VANCOMYCIN HYDROCHLORIDE 1000 MG: 1 INJECTION, POWDER, FOR SOLUTION INTRAVENOUS at 07:11

## 2019-11-06 RX ADMIN — MIDAZOLAM HYDROCHLORIDE 1 MG: 1 INJECTION, SOLUTION INTRAMUSCULAR; INTRAVENOUS at 07:11

## 2019-11-06 RX ADMIN — ACETAMINOPHEN 1000 MG: 10 INJECTION, SOLUTION INTRAVENOUS at 10:11

## 2019-11-06 RX ADMIN — FENTANYL CITRATE 50 MCG: 50 INJECTION, SOLUTION INTRAMUSCULAR; INTRAVENOUS at 08:11

## 2019-11-06 RX ADMIN — ONDANSETRON 4 MG: 2 INJECTION INTRAMUSCULAR; INTRAVENOUS at 10:11

## 2019-11-06 RX ADMIN — LIDOCAINE HYDROCHLORIDE 80 MG: 20 INJECTION, SOLUTION INTRAVENOUS at 08:11

## 2019-11-06 RX ADMIN — Medication 10 MG: at 09:11

## 2019-11-06 RX ADMIN — SODIUM CHLORIDE, SODIUM GLUCONATE, SODIUM ACETATE, POTASSIUM CHLORIDE, MAGNESIUM CHLORIDE, SODIUM PHOSPHATE, DIBASIC, AND POTASSIUM PHOSPHATE: .53; .5; .37; .037; .03; .012; .00082 INJECTION, SOLUTION INTRAVENOUS at 11:11

## 2019-11-06 RX ADMIN — Medication 30 MG: at 08:11

## 2019-11-06 RX ADMIN — PROPOFOL 50 MG: 10 INJECTION, EMULSION INTRAVENOUS at 08:11

## 2019-11-06 RX ADMIN — METOPROLOL TARTRATE 25 MG: 25 TABLET ORAL at 07:11

## 2019-11-06 RX ADMIN — VASOPRESSIN 0.5 UNITS: 20 INJECTION INTRAVENOUS at 08:11

## 2019-11-06 RX ADMIN — PROPOFOL 120 MG: 10 INJECTION, EMULSION INTRAVENOUS at 08:11

## 2019-11-06 RX ADMIN — SODIUM CHLORIDE: 0.9 INJECTION, SOLUTION INTRAVENOUS at 07:11

## 2019-11-06 RX ADMIN — FENTANYL CITRATE 25 MCG: 50 INJECTION, SOLUTION INTRAMUSCULAR; INTRAVENOUS at 11:11

## 2019-11-06 RX ADMIN — GLYCOPYRROLATE 0.4 MG: 0.2 INJECTION, SOLUTION INTRAMUSCULAR; INTRAVENOUS at 11:11

## 2019-11-06 RX ADMIN — ROCURONIUM BROMIDE 40 MG: 10 INJECTION, SOLUTION INTRAVENOUS at 08:11

## 2019-11-06 RX ADMIN — FAMOTIDINE 20 MG: 10 INJECTION, SOLUTION INTRAVENOUS at 08:11

## 2019-11-06 RX ADMIN — GENTAMICIN SULFATE 160 MG: 40 INJECTION, SOLUTION INTRAMUSCULAR; INTRAVENOUS at 08:11

## 2019-11-06 RX ADMIN — FENTANYL CITRATE 25 MCG: 50 INJECTION, SOLUTION INTRAMUSCULAR; INTRAVENOUS at 10:11

## 2019-11-06 RX ADMIN — VASOPRESSIN 0.5 UNITS: 20 INJECTION INTRAVENOUS at 09:11

## 2019-11-06 RX ADMIN — FENTANYL CITRATE 100 MCG: 50 INJECTION, SOLUTION INTRAMUSCULAR; INTRAVENOUS at 08:11

## 2019-11-06 NOTE — ANESTHESIA POSTPROCEDURE EVALUATION
Anesthesia Post Evaluation    Patient: Alin Burkett    Procedure(s) Performed: Procedure(s) (LRB):  URETHROPLASTY, USING PATCH GRAFT BUCCAL MUCOSA GRAFT (N/A)  CYSTOSCOPY, FLEXIBLE (N/A)    Final Anesthesia Type: general  Patient location during evaluation: PACU  Patient participation: Yes- Able to Participate  Level of consciousness: awake and alert and oriented  Post-procedure vital signs: reviewed and stable  Pain management: adequate  Airway patency: patent  PONV status at discharge: No PONV  Anesthetic complications: no      Cardiovascular status: blood pressure returned to baseline  Respiratory status: unassisted, room air and spontaneous ventilation  Hydration status: euvolemic  Follow-up not needed.          Vitals Value Taken Time   /83 11/6/2019 12:01 PM   Temp 37.2 °C (99 °F) 11/6/2019 11:29 AM   Pulse 50 11/6/2019 12:04 PM   Resp 11 11/6/2019 12:04 PM   SpO2 100 % 11/6/2019 12:04 PM   Vitals shown include unvalidated device data.      No case tracking events are documented in the log.      Pain/Raheem Score: Raheem Score: 8 (11/6/2019 11:29 AM)

## 2019-11-06 NOTE — TELEPHONE ENCOUNTER
Notified patient of Dr. Mcclain's review of 11/5/19 lab results via [facility]. Instructions given for patient to increase Calcitriol to 0.5mcg daily.     Follow-up message or call will be sent to patient to verify he received the message.

## 2019-11-06 NOTE — TRANSFER OF CARE
"Anesthesia Transfer of Care Note    Patient: Alin Burkett    Procedure(s) Performed: Procedure(s) (LRB):  URETHROPLASTY, USING PATCH GRAFT BUCCAL MUCOSA GRAFT (N/A)  CYSTOSCOPY, FLEXIBLE (N/A)    Patient location: PACU    Anesthesia Type: general    Transport from OR: Transported from OR on 6-10 L/min O2 by face mask with adequate spontaneous ventilation    Post pain: adequate analgesia    Post assessment: no apparent anesthetic complications and tolerated procedure well    Post vital signs: stable    Level of consciousness: awake and responds to stimulation    Nausea/Vomiting: no nausea/vomiting    Complications: none    Transfer of care protocol was followed      Last vitals:   Visit Vitals  /82   Pulse 71   Temp 37.2 °C (99 °F) (Axillary)   Resp 18   Ht 5' 11" (1.803 m)   Wt 68 kg (150 lb)   SpO2 100%   BMI 20.92 kg/m²     "

## 2019-11-06 NOTE — DISCHARGE INSTRUCTIONS

## 2019-11-06 NOTE — TELEPHONE ENCOUNTER
----- Message from Chasidy Mcclain MD sent at 11/5/2019  2:58 PM CST -----  Please increase calcitriol to 0.5 mcg daily.

## 2019-11-06 NOTE — OP NOTE
[facility]  Operative Note    Date: 2019    Pre-Op Diagnosis: urethral stricture    Patient Active Problem List    Diagnosis Date Noted    C. difficile diarrhea 2019    UTI due to Klebsiella species 2019    SVT (supraventricular tachycardia) 2019    AMI (acute myocardial infarction) 2019    Elevated troponin 2019    Thrombocytopenia 2019    Abdominal aortic atherosclerosis 2019    Abdominal aortic aneurysm (AAA) without rupture 2019    Recurrent UTI 2017    Bladder diverticulum 2017    Stricture of anterior urethra in male 2017    Stage 3 chronic kidney disease 2017    Hypophosphatemia 01/15/2017    -donor kidney transplant     Long-term use of immunosuppressant medication 2016    BPH (benign prostatic hyperplasia) 10/18/2016    Acute coronary syndrome 2016    LOUISA (obstructive sleep apnea), CPAP refused 2015    LVH (left ventricular hypertrophy) due to hypertensive disease 2015    Arrhythmia, onset 2015    History of smoking 10-25 pack years, quit , 20 pack-years 2015    History of alcohol abuse, quit 2015    Hypothyroidism 01/10/2014    Plantar warts 01/10/2014    Essential hypertension     Congenital absence of kidney     Anemia of chronic disease     Secondary hyperparathyroidism, renal     Adrenal adenoma          Post-Op Diagnosis: same    Procedure(s) Performed:   Excision and primary anastomosis urethroplasty   Cystoscopy    Specimen(s): none    Staff Surgeon: Dewey Mann MD    Assistant Surgeon:  John Mckeon    Anesthesia: General endotracheal anesthesia    Indications: Alin Burkett is a 69 y.o. male with urethral stricture here for definitive intervention.    Findings:   - 3 cm bulbar urethral stricture repaired by excision and primary anastomosis     Estimated Blood Loss: minimal    Drains: 16 Fr silicone  catheter    Procedure in detail:  After the risks and the benefits of the procedure were explained consent was obtained.  The pt was transferred to the operating room and placed under anesthesia after time out was confirmed. Pre-operative antibiotics were administered. The pt was then placed in a slightly exaggerated dorsal lithotomy position. His perineum was then shaved, prepped and draped in a normal and sterile fashion.    We started by inserting a bougie a boule until the stricture was engaged.  The tip of the bougie was palpated and an the corpus spongiosum was marked in this location.    We then identified the median raphe extending onto his perineum and made 4cm incision along the raphe on the perineum. Bovie cautery was then used to dissect down through the subcutaneous tissues.  Once the bulbospongiosus was identified it was split in the midline. We then released the bulb from the central tendon in order to allow proximal mobilization.  The urethra was dissected circumferentially well distal to the stricture, and proximal all the way to the membranous urethra.      With the bougie in place at the level of the stricture, the urethra was opened longitudinally with a scalpel.  This incision was carried proximally with Metzenbaum scissors until healthy, normal urethra was encountered.  The total length of the strictured segment was 3 cm.  Flexible cystoscopy was performed proximal to the stricture-- this demonstrated that the remainder of the urethra was patent.      We then clamped the corpus spongiosum and the proximal and distal margins of the strictured segment with angled Debakey clamps, and sharply excised the strictured segment, leaving healthy, bleeding edges proximally and distally.  The cut edges were both spatulated, one ventrally and one dorsally.     After ensuring that there was adequate mobilization for the ends to reach each other in a tension-free manner, we used 4-0 vicryl to anchor the  proximal and distal ends of the urethra to the urogenital diaphragm.  This allowed the spatulated, cut ends to lie adjacent to each other with no gap and no tension.      Interrupted 5-0 PDS was then used to approximate the dorsal aspect of the anastomosis.  This was done taking full-thickness bites of urethral mucosa and corpus spongiosum.  A 16 fr fournier catheter was then inserted.  The ventral side of the anastomosis was performed with 5-0 PDS in interrupted fashion, taking bites of mucosa only.    Felecia was applied.  The corpus spongiosum was then closed over this with 5-0 PDS in running fashion. The bulbospongiosus muscle was closed over this with 3-0 vicryl, followed by Colles fascia with 3-0 vicryl, and the skin with 4-0 monocryl in running horizontal mattress fashion.   0.25% marcaine was used to anesthetize the skin.  Dermabond was applied. Fluffs and mesh underwear were applied.     Disposition:  1. Catheter x 3 weeks  2. Melissa-catheter rug prior to catheter removal  3. Suppressive keflex while catheter in place.  4. Ice to perineum    John Mckeon MD

## 2019-11-06 NOTE — PLAN OF CARE
Patient prepped for surgery.  Urine collected and left at bedside.  Significant other signed up for text message updates.

## 2019-11-06 NOTE — PROGRESS NOTES
Spoke with Pt wife again in regards to discharge plan, wife will try to get daughter to pick pt up in about 2 hours

## 2019-11-06 NOTE — DISCHARGE SUMMARY
[facility]  Discharge Note  Short Stay    Admit Date: 11/6/2019    Discharge Date and Time: 11/6/2019       Attending Physician: Dewey Mann MD     Discharge Provider: John Mckeon MD    Diagnoses:  Active Hospital Problems    Diagnosis  POA    *Stricture of anterior urethra in male [N35.914]  Yes    AMI (acute myocardial infarction) [I21.9]  Yes     Chronic    Thrombocytopenia [D69.6]  Yes    Abdominal aortic aneurysm (AAA) without rupture [I71.4]  Yes    Bladder diverticulum [N32.3]  Yes    Stage 3 chronic kidney disease [N18.3]  Yes     Chronic    Long-term use of immunosuppressant medication [Z79.899]  Not Applicable    BPH (benign prostatic hyperplasia) [N40.0]  Yes     Chronic     s/p TURP with urethral stricture      Acute coronary syndrome [I24.9]  Yes    LOUISA (obstructive sleep apnea), CPAP refused [G47.33]  Yes     Chronic    LVH (left ventricular hypertrophy) due to hypertensive disease [I11.9]  Yes    History of smoking 10-25 pack years, quit 2010, 20 pack-years [Z87.891]  Not Applicable    History of alcohol abuse, quit 2010 [F10.11]  Yes    Hypothyroidism [E03.9]  Yes     Chronic    Secondary hyperparathyroidism, renal [N25.81]  Yes     Chronic    Essential hypertension [I10]  Yes     Chronic    Anemia of chronic disease [D63.8]  Yes     Chronic    Adrenal adenoma [D35.00]  Yes     Chronic      Resolved Hospital Problems   No resolved problems to display.       Discharged Condition: good    Hospital Course: Patient was admitted for a urethroplasty and tolerated the procedure well with no complications.  He will follow up in 3 weeks with pericatheter RUG.     Final Diagnoses: Same as principal problem.    Disposition: Home or Self Care    Follow up/Patient Instructions:    Medications:  Reconciled Home Medications:      Medication List      START taking these medications    cephALEXin 500 MG capsule  Commonly known as:  KEFLEX  Take 1 capsule (500 mg total) by mouth  every 12 (twelve) hours. for 21 days        CHANGE how you take these medications    calcitRIOL 0.5 MCG Cap  Commonly known as:  ROCALTROL  Take 1 capsule (0.5 mcg total) by mouth once daily.  What changed:    · medication strength  · how much to take        CONTINUE taking these medications    aspirin 81 MG EC tablet  Commonly known as:  ECOTRIN  Take 1 tablet (81 mg total) by mouth once daily.     famotidine 20 MG tablet  Commonly known as:  PEPCID  Take 1 tablet (20 mg total) by mouth every evening.     FLUAD 2598-6895 (65 YR UP)(PF) 45 mcg (15 mcg x 3)/0.5 mL Syrg  Generic drug:  flu vac 2019 [id number](PF)  PHARMACIST ADMINISTERED IMMUNIZATION ADMINISTERED AT TIME OF DISPENSING     ketoconazole 200 mg Tab  Commonly known as:  NIZORAL  Take 0.5 tablets (100 mg total) by mouth once daily.     levothyroxine 100 MCG tablet  Commonly known as:  SYNTHROID  Take 1 tablet (100 mcg total) by mouth once daily.     magnesium oxide 400 mg (241.3 mg magnesium) tablet  Commonly known as:  MAG-OX  Take 1 tablet (400 mg total) by mouth once daily.     metoprolol tartrate 25 MG tablet  Commonly known as:  LOPRESSOR  Take 0.5 tablets (12.5 mg total) by mouth 2 (two) times daily.     * mycophenolate 250 mg Cap  Commonly known as:  CELLCEPT  Take 3 capsules (750 mg total) by mouth 2 (two) times daily. Z94.0/Kidney transplant on 11/26/16     * mycophenolate 250 mg Cap  Commonly known as:  CELLCEPT     ONE DAILY MULTIVITAMIN per tablet  Generic drug:  multivitamin  Take 1 tablet by mouth once daily.     PEP INJECTION (FOR RX USE)  Inject 0.25 ml as directed.   May increase by 0.05 ml such as 0.30, 0.35 ml etc. Up to 1.00 ml Until adequate result is achieved.    For compounding pharmacy use:   Add PAPAVERINE 30 mcg  Add PHENTOLAMINE 10 mg  Add ALPROSTADIL 100 mcg     predniSONE 5 MG tablet  Commonly known as:  DELTASONE  Take 1 tablet (5 mg total) by mouth once daily. Z94.0/Kidney transplant on 11/26/2016     sodium bicarbonate  650 MG tablet  Take 1 tablet (650 mg total) by mouth 2 (two) times daily.     tacrolimus 1 MG Cap  Commonly known as:  PROGRAF  Take 3 capsules (3 mg total) by mouth every morning AND 2 capsules (2 mg total) every evening. Z94.0/Kidney Transplant on 11/26/16.         * This list has 2 medication(s) that are the same as other medications prescribed for you. Read the directions carefully, and ask your doctor or other care provider to review them with you.            STOP taking these medications    amoxicillin-clavulanate 875-125mg 875-125 mg per tablet  Commonly known as:  AUGMENTIN     levoFLOXacin 750 MG tablet  Commonly known as:  LEVAQUIN          Discharge Procedure Orders   FL Urethrogram Retrograde (xpd)   Standing Status: Future Standing Exp. Date: 11/06/20     Order Specific Question Answer Comments   Reason for Exam: s/p urethroplasty.  Needs ingrid-catheter RUG.    May the Radiologist modify the order per protocol to meet the clinical needs of the patient? Yes      Call MD for:  temperature >100.4     Call MD for:  persistent nausea and vomiting or diarrhea     Call MD for:  severe uncontrolled pain     Call MD for:  redness, tenderness, or signs of infection (pain, swelling, redness, odor or green/yellow discharge around incision site)     Call MD for:  difficulty breathing or increased cough     Call MD for:  severe persistent headache     Call MD for:  worsening rash     Call MD for:  persistent dizziness, light-headedness, or visual disturbances     Call MD for:  increased confusion or weakness     No dressing needed     Other restrictions (specify):   Order Comments: No driving while taking pain medications.  No lifting more than 10 pounds for 6 weeks.  No sex or masturbation for 6 weeks.   Do not submerge incisions.     Follow-up Information     Dewey Mann MD In 3 weeks.    Specialty:  Urology  Why:  post op urethroplasty  Contact information:  [id number]  [facility]  [id number]  [phone]                   Discharge Procedure Orders (must include Diet, Follow-up, Activity):   Discharge Procedure Orders (must include Diet, Follow-up, Activity)   FL Urethrogram Retrograde (xpd)   Standing Status: Future Standing Exp. Date: 11/06/20     Order Specific Question Answer Comments   Reason for Exam: s/p urethroplasty.  Needs ingrid-catheter RUG.    May the Radiologist modify the order per protocol to meet the clinical needs of the patient? Yes      Call MD for:  temperature >100.4     Call MD for:  persistent nausea and vomiting or diarrhea     Call MD for:  severe uncontrolled pain     Call MD for:  redness, tenderness, or signs of infection (pain, swelling, redness, odor or green/yellow discharge around incision site)     Call MD for:  difficulty breathing or increased cough     Call MD for:  severe persistent headache     Call MD for:  worsening rash     Call MD for:  persistent dizziness, light-headedness, or visual disturbances     Call MD for:  increased confusion or weakness     No dressing needed     Other restrictions (specify):   Order Comments: No driving while taking pain medications.  No lifting more than 10 pounds for 6 weeks.  No sex or masturbation for 6 weeks.   Do not submerge incisions.

## 2019-11-06 NOTE — PROGRESS NOTES
Pt planned to be discharged home today instead of staying overnight. Notified wife, wife states she is working and won't be able to pick pt up til after work which is about 1800 this evening. Notified charge nurse

## 2019-11-06 NOTE — INTERVAL H&P NOTE
The patient has been examined and the H&P has been reviewed:    I concur with the findings and no changes have occurred since H&P was written.    Anesthesia/Surgery risks, benefits and alternative options discussed and understood by patient/family.          Active Hospital Problems    Diagnosis  POA    Stricture of anterior urethra in male [N35.914]  Yes      Resolved Hospital Problems   No resolved problems to display.

## 2019-11-07 LAB — CMV DNA SERPL NAA+PROBE-ACNC: NORMAL IU/ML

## 2019-11-15 NOTE — PROGRESS NOTES
[facility]  Kidney Transplant  Progress Note      Reason for Follow-up: Reassessment of Kidney Transplant - 2016  (#1) recipient and management of immunosuppression.    ORGAN: LEFT KIDNEY    Donor Type:  - Brain Death      Subjective:   History of Present Illness:  Mr. Burkett is a 68 y.o. 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 Mr. Burkett had  ESRD HD due to HTN  and congenital absent left kidney who was on dialysis since 6/16/10 until receiving PHS increased risk DDRT (pumped kidney, Campath induction, KDPI 36%, WIT 30 minutes, CIT 14 hours and 14 minutes, donor RPR positive thus patient completed PCN x3, CMV D+/R+) on 16. His baseline creatinine is 1.4-1.6 mg/dL. He takes mycophenolate mofetil, prednisone and tacrolimus  For Immunosuppression.    Post Transplant Course: As per Dr Mcclain  - Pt had DIVINE with suspected rejection on 17.  Kid bx  revealed AVR  Had thymo x 5-7 doses. DSA neg.  -  - He was rebiopsied on 17 which showed acute tubular injury and vacuolization, with + myoglobulin and negative heme.  - Recurrent UTI  Mr Alin Burkett is admitted to Hospital Medicine secondary to an ablation that he will have by Dr Mcmanus.his Cr is 1.5 mg/dL. He is having his ablation today. Renal function is stable. Encourage hydration. KTM cx for Kidney allograft assessment and IS management.    Mr. Burkett is a 69 y.o. year old male who is status post Kidney Transplant - 2016  (#1).    His maintenance immunosuppression consists of:   Immunosuppressants (From admission, onward)    Start     Stop Route Frequency Ordered    19 1800  tacrolimus capsule 2 mg      -- Oral Daily 19 0009    19 000  tacrolimus capsule 3 mg      -- Oral Daily 19 000          Interval History:  Patient evaluated bedside, feeling better, no acute distress, abdominal pain much improved.  Night  "uneventful.  Stable vital signs.  Good urine output.    Past Medical, Surgical, Family, and Social History:   Unchanged from H&P.    Scheduled Meds:   aspirin  81 mg Oral Daily    calcitRIOL  0.25 mcg Oral Daily    cefTRIAXone (ROCEPHIN) IVPB  1 g Intravenous Q24H    famotidine  20 mg Oral QHS    ketoconazole  100 mg Oral Daily    levothyroxine  100 mcg Oral Daily    magnesium oxide  400 mg Oral Daily    metoprolol tartrate  12.5 mg Oral BID    potassium, sodium phosphates  2 packet Oral Once    predniSONE  5 mg Oral Daily    sodium bicarbonate  1,300 mg Oral BID    tacrolimus  2 mg Oral Daily    tacrolimus  3 mg Oral Daily     Continuous Infusions:  PRN Meds:acetaminophen, HYDROcodone-acetaminophen, HYDROmorphone, lidocaine HCL 20 mg/ml (2%), ondansetron, ondansetron, polyethylene glycol, promethazine, sodium chloride 0.9%    Intake/Output - Last 3 Shifts       08/22 0700 - 08/23 0659 08/23 0700 - 08/24 0659 08/24 0700 - 08/25 0659    P.O.  480 360    I.V. (mL/kg) 800 (11.9)      Total Intake(mL/kg) 800 (11.9) 480 (7.3) 360 (5.5)    Urine (mL/kg/hr) 925 (0.6)      Total Output 925      Net -125 +480 +360           Urine Occurrence  1 x            Review of Systems   Objective:     Vital Signs (Most Recent):  Temp: 98.6 °F (37 °C) (08/24/19 0823)  Pulse: 76 (08/24/19 0823)  Resp: 15 (08/24/19 0823)  BP: 131/88 (08/24/19 0823)  SpO2: 97 % (08/24/19 0823) Vital Signs (24h Range):  Temp:  [98.6 °F (37 °C)-99.4 °F (37.4 °C)] 98.6 °F (37 °C)  Pulse:  [62-76] 76  Resp:  [15-18] 15  SpO2:  [95 %-98 %] 97 %  BP: (115-136)/(76-88) 131/88     Weight: 66 kg (145 lb 8.1 oz)  Height: 5' 11" (180.3 cm)  Body mass index is 20.29 kg/m².    Physical Exam   Constitutional: He is oriented to person, place, and time. He appears well-developed and well-nourished. No distress.   HENT:   Head: Normocephalic and atraumatic.   Eyes: Pupils are equal, round, and reactive to light. Conjunctivae are normal.   Neck: Trachea normal. " Neck supple. No JVD present.   Cardiovascular: Normal rate, regular rhythm, S1 normal, S2 normal, normal heart sounds and normal pulses. Exam reveals no gallop and no friction rub.   No murmur heard.  Pulmonary/Chest: Effort normal and breath sounds normal.   Abdominal: Soft. Bowel sounds are normal.   Musculoskeletal: Normal range of motion.   Neurological: He is alert and oriented to person, place, and time.   Skin: Skin is warm and dry.   Psychiatric: He has a normal mood and affect. His behavior is normal. Thought content normal.   Nursing note and vitals reviewed.      Laboratory:  CBC:   Recent Labs   Lab 08/19/19  2305 08/21/19  0425   WBC 8.89 7.22   RBC 4.31* 4.02*   HGB 12.8* 12.1*   HCT 40.8 37.8*    150   MCV 95 94   MCH 29.7 30.1   MCHC 31.4* 32.0     CMP:   Recent Labs   Lab 08/22/19  0446 08/23/19  0808 08/24/19  0351   GLU 77 78 82   CALCIUM 9.0 9.5 9.1   ALBUMIN 3.0* 3.4* 2.7*   PROT 6.5 7.3 6.2    141 142   K 3.4* 4.1 3.9   CO2 20* 21* 24    110 110   BUN 22 16 19   CREATININE 1.5* 1.4 1.6*   ALKPHOS 57 63 53*   ALT 18 21 15   AST 34 32 19     Labs within the past 24 hours have been reviewed.      Assessment/Plan:      Urinary tract infection without hematuria  - Urine culture in progress agree with ceftriaxone deescalate once sensitivities are back  - Hold Cellcept while patient is on antibiotics restart when antibiotics have been completed.      Stage 3 chronic kidney disease  CKD stage G3a/A1 stable sCr at baseline 1.5 mg/dL.  Stable renal function     - UA with greater than 100 K WBC urine culture in progress empiric antibiotics started by primary team Ceftriaxone  - Please wait for full sensitivity; for now continue empiric treatment as patient's symptoms and sCr improving  - Acid-Base: Continue home dose of sodium Bicarb  - Electrolytes: please get RFP and Mag daily. Stable electrolytes  - Maintain MAP > 65  - Hg >7 if Hg < 7 please transfuse.   - Continue to monitor strict  I/O's, daily weights, renally dose medications, and avoid nephrotoxic agents           -donor kidney transplant  Induced w Campath 30 mg IV intraoperatively & SoluMedrol 875 mg total over 3 days.   Renal allograft biopsy 17 (DIVINE): 21 glomeruli, none globally sclerosed, <5% interstitial fibrosis, no ACR, c4d negative, AVR CCT Type 2 (V1 lesion); plan THYMO   Renal allograft biopsy 17 (follow-up after rejection treatment): 27 glomeruli, no AVR, no ACR, C4d negative, no microcirculation changes, 5-10% interstitial fibrosis; ?cast --> further studies pending        Long-term use of immunosuppressant medication  - induction with Campath  and solumderol  - Keto 100 mg daily  - continue Prograf 3/2 mg, will need to monitor for toxicities  - Tac Level in am, goal [id number] trough level 4-6  - Holding Cellcept in setting of recurrent UTI  - Continue prednisone 5 mg daily           Essential hypertension  - Acceptable BP, will continue to monitor. Goal for BP is <130 mmHg SBP and BDP <80 mmHg.  - As per            Elevated troponin  As per Cardiology and primary team           Portillo Dhillon MD  Kidney Transplant  [facility]   Never smoker

## 2019-11-24 NOTE — PROGRESS NOTES
Kidney Post-Transplant Assessment    Referring Physician: [place]  Current Nephrologist:     ORGAN: LEFT KIDNEY  Donor Type:  - brain death   PHS Increased Risk: yes  Cold Ischemia: 854 mins  Induction Medications: campath - alemtuzumab (anti-cd52)      Subjective:     CC:  Reassessment of renal allograft function and management of chronic immunosuppression.    Kidney History:  Mr. Burkett is a 69 y.o. 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 Mr. Burkett had  ESRD HD due to HTN  and congenital absent left kidney who was on dialysis since 6/16/10 until receiving PHS increased risk DDRT (pumped kidney, Campath induction, KDPI 36%, WIT 30 minutes, CIT 14 hours and 14 minutes, donor RPR positive thus patient completed PCN x3, CMV D+/R+) on 16. His most recent creatinine is 1.4. He takes mycophenolate mofetil, prednisone and tacrolimus  For Immunosuppression.       Post Transplant Course:   - Pt had DIVINE with suspected rejection on 17.  Kid bx  revealed AVR  Had thymo x 5-7 doses. DSA neg.  -  - He was rebiopsied on 17 which showed acute tubular injury and vacuolization, with + myoglobulin and negative heme.  - Recurrent UTI  - Patient was admitted for a urethroplasty and tolerated the procedure well with no complications in 2019.  He will follow up in 3 weeks with pericatheter RUG.        Interval History:  He feels well, he has fournier cath and will follow urology today. he denies any chest pain, shortness of breath, ENT symptoms, nausea/vomiting, or pain over the allograft. His home BPs are acceptable.      Medications:   Current Outpatient Medications   Medication Sig Dispense Refill    aspirin (ECOTRIN) 81 MG EC tablet Take 1 tablet (81 mg total) by mouth once daily.  0    calcitRIOL (ROCALTROL) 0.5 MCG Cap Take 1 capsule (0.5 mcg total) by mouth once daily. 30 capsule 11    cephALEXin (KEFLEX) 500 MG  capsule Take 1 capsule (500 mg total) by mouth every 12 (twelve) hours. for 21 days 42 capsule 0    famotidine (PEPCID) 20 MG tablet Take 1 tablet (20 mg total) by mouth every evening. 90 tablet 3    ketoconazole (NIZORAL) 200 mg Tab Take 0.5 tablets (100 mg total) by mouth once daily. 45 tablet 3    levothyroxine (SYNTHROID) 100 MCG tablet Take 1 tablet (100 mcg total) by mouth once daily. 90 tablet 3    magnesium oxide (MAG-OX) 400 mg (241.3 mg magnesium) tablet Take 1 tablet (400 mg total) by mouth once daily. 90 tablet 3    metoprolol tartrate (LOPRESSOR) 25 MG tablet Take 0.5 tablets (12.5 mg total) by mouth 2 (two) times daily. 90 tablet 3    multivitamin (ONE DAILY MULTIVITAMIN) per tablet Take 1 tablet by mouth once daily.      mycophenolate (CELLCEPT) 250 mg Cap Take 3 capsules (750 mg total) by mouth 2 (two) times daily. Z94.0/Kidney transplant on 11/26/16 180 capsule 11    predniSONE (DELTASONE) 5 MG tablet Take 1 tablet (5 mg total) by mouth once daily. Z94.0/Kidney transplant on 11/26/2016 90 tablet 3    sodium bicarbonate 650 MG tablet Take 1 tablet (650 mg total) by mouth 2 (two) times daily. 540 tablet 3    tacrolimus (PROGRAF) 1 MG Cap Take 3 capsules (3 mg total) by mouth every morning AND 2 capsules (2 mg total) every evening. Z94.0/Kidney Transplant on 11/26/16. 450 capsule 3    FLUAD 0825-2172, 65 YR UP,,PF, 45 mcg (15 mcg x 3)/0.5 mL Syrg PHARMACIST ADMINISTERED IMMUNIZATION ADMINISTERED AT TIME OF DISPENSING  0    mycophenolate (CELLCEPT) 250 mg Cap       oxyCODONE (ROXICODONE) 5 MG immediate release tablet Take 1 tablet (5 mg total) by mouth every 6 (six) hours as needed for Pain. (Patient not taking: Reported on 11/25/2019) 11 tablet 0    pep injection Inject 0.25 ml as directed.   May increase by 0.05 ml such as 0.30, 0.35 ml etc. Up to 1.00 ml Until adequate result is achieved.    For compounding pharmacy use:   Add PAPAVERINE 30 mcg  Add PHENTOLAMINE 10 mg  Add ALPROSTADIL  "100 mcg (Patient not taking: Reported on 11/25/2019) 1 vial 2     No current facility-administered medications for this visit.      Facility-Administered Medications Ordered in Other Visits   Medication Dose Route Frequency Provider Last Rate Last Dose    lidocaine HCL 2% jelly   Topical (Top) PRN John Mckeon MD               Review of Systems     Constitutional: Negative for fever, appetite change and fatigue.    Respiratory: Negative for cough, chest tightness, shortness of breath and wheezing.   Cardiovascular: Negative for chest pain, palpitations and leg swelling.   Gastrointestinal: Negative for nausea, vomiting, abdominal pain, constipation, blood in stool and abdominal distention.   Genitourinary: No hematuria, decreased urine volume, difficulty urinating.  Hematological: Negative for adenopathy. Does not bruise/bleed easily.   Psychiatric/Behavioral: Negative for confusion, sleep disturbance and dysphoric mood. The patient is not nervous/anxious.        Objective:     Blood pressure 114/84, pulse 69, temperature 98 °F (36.7 °C), temperature source Oral, resp. rate 18, height 5' 11" (1.803 m), weight 69.3 kg (152 lb 12.5 oz), SpO2 100 %.  body mass index is 21.31 kg/m².    Physical Exam     Respiratory: Clear to auscultation bilaterally, respirations unlabored, no rales/rhonchi/wheezing  Cardiovacular: Regular rate and rhythm, S1, S2 normal, no murmurs, rubs or gallops, no carotid bruit  Gastrointestinal: Soft, non-tender, bowel sounds normal, no hepatosplenomegaly  Renal allograft exam: No tenderness, no bruits, normal exam  Musculoskeletal: No knee or ankle joint tenderness or swelling.   Extremities: No clubbing or cyanosis of bilateral upper extremities; no lower extremity edema bilaterally  Skin: warm and dry; no rash on exposed skin  Neurologic: CN grossly intact, alert and oriented x 3    Labs:  Lab Results   Component Value Date    WBC 4.28 11/05/2019    HGB 13.0 (L) 11/05/2019    HCT 42.7 " 2019     2019    K 3.6 2019     (H) 2019    CO2 24 2019    BUN 24 (H) 2019    CREATININE 1.7 (H) 2019    EGFRNONAA 40.5 (A) 2019    CALCIUM 9.3 2019    PHOS 3.0 2019    MG 2.0 2019    ALBUMIN 3.8 2019    ALBUMIN 3.8 2019    AST 33 2019    ALT 26 2019    UTPCR 0.19 2019    .0 (H) 2019    TACROLIMUS 5.5 2019    CYCLOSPORINE <10 (L) 01/10/2017       No results found for: EXTANC, EXTWBC, EXTSEGS, EXTPLATELETS, EXTHEMOGLOBI, EXTHEMATOCRI, EXTCREATININ, EXTSODIUM, EXTPOTASSIUM, EXTBUN, EXTCO2, EXTCALCIUM, EXTPHOSPHORU, EXTGLUCOSE, EXTALBUMIN, EXTAST, EXTALT, EXTBILITOTAL, EXTLIPASE, EXTAMYLASE    No results found for: EXTCYCLOSLVL, EXTSIROLIMUS, EXTTACROLVL, EXTPROTCRE, EXTPTHINTACT, EXTPROTEINUA, EXTWBCUA, EXTRBCUA    Labs were reviewed with the patient.    Assessment/Plan:     1. Long-term use of immunosuppressant medication    2. Secondary hyperparathyroidism, renal    3. -donor kidney transplant    4. Stage 3 chronic kidney disease    5. Essential hypertension        Mr. Burkett is a 69 y.o. male with:       # History of ESRD presumed secondary to HTN and congenital solitary kidney  - s/p DDRT on 16  - Kid bx 17 revealed AVR. Had thymo x 5 doses. DSA neg.    - Kidney rebiopsy on 17 showed acute tubular injury and vacuolization, with + myoglobulin and negative heme.  - last Cr 1.7                    - non significant proteinuria    # Immunosuppression:   - continue Prograf and ketoconazole at the current dose  - continue  mg BID   - continue prednisone      # History of recurrent UTI  - has history of urethral stricture in the past  - S/p Urethroplasty in 2019  - follow with urology      # Infectious Surveillance:   - last CMV : negative  - last BK PCR : negative    # HTN:   - BPs well controlled   - continue with home blood pressure monitoring  - low salt and  healthy life discussed with the patient     # Metabolic Bone Disease/Secondary Hyperparathyroidism:  - High PTH and low vitamin D   - stable Ca, PO4 and Mg  - on ergocalciferol 87331 units weekly and calcitriol       # Anemia of chronic disease:   - Hb stable  - will continue monitoring as per our center guidelines.       # Hyperuricemia   - on low purine diet  - will monitor uric acid                    Plan:  - urology follow up   - PTH and vitamin D in 6 months  - Labs in 3 months

## 2019-11-25 PROBLEM — I21.9 AMI (ACUTE MYOCARDIAL INFARCTION): Chronic | Status: RESOLVED | Noted: 2019-08-20 | Resolved: 2019-01-01

## 2019-11-25 PROBLEM — N39.0 UTI DUE TO KLEBSIELLA SPECIES: Status: RESOLVED | Noted: 2019-08-23 | Resolved: 2019-01-01

## 2019-11-25 PROBLEM — B96.89 UTI DUE TO KLEBSIELLA SPECIES: Status: RESOLVED | Noted: 2019-08-23 | Resolved: 2019-01-01

## 2019-11-25 NOTE — LETTER
November 25, 2019                     [address]  [address]  [facility] [address]  Phone: [phone]   Patient: Alin Burkett   MR Number: [id number]   YOB: 1950   Date of Visit: 11/25/2019       Dear      Thank you for referring Alin Burkett to me for evaluation. Attached you will find relevant portions of my assessment and plan of care.    If you have questions, please do not hesitate to call me. I look forward to following Alin Burkett along with you.    Sincerely,    Chasidy Mcclain MD    Enclosure    If you would like to receive this communication electronically, please contact [email or web address] or [phone] to request [employer] Link access.    [employer] Link is a tool which provides read-only access to select patient information with whom you have a relationship. Its easy to use and provides real time access to review your patients record including encounter summaries, notes, results, and demographic information.    If you feel you have received this communication in error or would no longer like to receive these types of communications, please e-mail [email or web address]

## 2019-11-26 NOTE — PROGRESS NOTES
CHIEF COMPLAINT:    Mr. Burkett is a 69 y.o. male presenting for post op visiting following urethroplasty on 11/6/19.    Alin Burkett is a 69 y.o. male with a PMH of urethral stricture, s/p transplant kidney, ED.    Procedure(s) Performed: 11/6/19  Excision and primary anastomosis urethroplasty   Cystoscopy   Findings:   - 3 cm bulbar urethral stricture repaired by excision and primary anastomosis     He is here today with ingrid-catheter RUG.  RUG on 11/25/19  The urethra was filled with contrast in a retrograde fashion through catheter tip syringe inserted adjacent to the Barker catheter.  The urethral lumen is normal in size.  Proximal penile/bulbous urethra is unremarkable with normal pericatheter contrast flow.  We were unable to reflux contrast into the bladder.  Prostatic urethra not visualized.  No evidence of extraluminal contrast to indicate urethral rupture or fistula.    s/p cysto and urethral dilation for anterior urethral stricture on 12/17.  Bladder diverticulum was also seen on cysto.     He has a history of self-cath with 16 Fr catheter occasionally but has not does this in over 3 months.  His stream used to be good while he was doing CICs, but he reports occasional trickling. He also occasionally passes some debris that may be stones.     He had a kidney transplant in 11/ 2016       REVIEW OF SYSTEMS:    Constitutional: Negative for fever and chills.   HENT: Negative for hearing loss.   Eyes: Negative for visual disturbance.   Respiratory: Negative for shortness of breath.   Cardiovascular: Negative for chest pain.   Gastrointestinal: Negative for vomiting, and constipation.   Genitourinary: See HPI  Neurological: Negative for dizziness.   Hematological: Does not bruise/bleed easily.   Psychiatric/Behavioral: Negative for confusion.       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  Renal allograft biopsy 17 (DIVINE): 21 glomeruli, none globally sclerosed, <5% interstitial fibrosis, no ACR, c4d negative, AVR CCT Type 2 (V1 lesion); plan THYMO     Dissecting aortic aneurysm (any part), abdominal     Diverticulosis     Encounter for blood transfusion     ESRD (end stage renal disease) 2010    H/O urethral stricture 2018    H/O: urethral stricture     History of AAA (abdominal aortic aneurysm) repair     History of urethral stricture 2018    Hypertension     Hypokalemia     Hypothyroidism 1/10/2014    Inguinal hernia bilateral, non-recurrent     Kidney stones     Organ transplant candidate 2013    Plantar warts 1/10/2014    Recurrent UTI 2017    S/P kidney transplant     Secondary hyperparathyroidism, renal     Thyroid disease        Past Surgical History:   Procedure Laterality Date    ABDOMINAL SURGERY      exploratory lapatomy x 2    ABLATION N/A 2019    Procedure: ABLATION, SVT;  Surgeon: Emerson Mcmanus MD;  Location: [facility] EP LAB;  Service: Cardiology;  Laterality: N/A;  SVT, RFA, CARTO, anes, GP, 323    AORTA - SUPERIOR MESENTERIC ARTERY BYPASS GRAFT      BLADDER NECK RECONSTRUCTION      BLADDER SURGERY      COLONOSCOPY  10/10/2013    Dr. Gutierrez, repeat in 5 years    CYSTOSCOPY      CYSTOSCOPY N/A 2018    Procedure: CYSTOSCOPY;  Surgeon: Dewey Mann MD;  [facility];  Service: Urology;  Laterality: N/A;  45 min    CYSTOURETHROSCOPY WITH DIRECT VISION  INTERNAL URETHROTOMY N/A 12/19/2018    Procedure: CYSTOSCOPY, WITH DIRECT VISION INTERNAL URETHROTOMY;  Surgeon: Dewey Mann MD;  [facility];  Service: Urology;  Laterality: N/A;    DILATION OF URETHRA N/A 12/19/2018    Procedure: DILATION, URETHRA;  Surgeon: Dewey Mann MD;  [facility];  Service: Urology;  Laterality: N/A;    FLEXIBLE CYSTOSCOPY N/A 11/6/2019    Procedure: CYSTOSCOPY, FLEXIBLE;  Surgeon: Dewey Mann MD;  Location: [facility] OR 2ND FLR;  Service: Urology;  Laterality: N/A;    GASTROJEJUNOSTOMY      HEMORRHOID SURGERY      HERNIA REPAIR      KIDNEY TRANSPLANT      LEFT HEART CATHETERIZATION Left 8/20/2019    Procedure: Left heart cath;  Surgeon: Javan Oscar MD;  Location: [facility] CATH/EP LAB;  Service: Cardiology;  Laterality: Left;    LITHOTRIPSY      PERCUTANEOUS NEPHROLITHOTRIPSY      right  ESWL  10/31/12    right ESWL  6/26/12    URETHROPLASTY USING PATCH GRAFT N/A 11/6/2019    Procedure: URETHROPLASTY, USING PATCH GRAFT BUCCAL MUCOSA GRAFT;  Surgeon: Dewey Mann MD;  [facility];  Service: Urology;  Laterality: N/A;  3 HOURS       Family History   Problem Relation Age of Onset    Diabetes Mother     Alzheimer's disease Mother     Alcohol abuse Father     HIV Brother     Stroke Maternal Aunt     Kidney disease Paternal Uncle     Kidney disease Cousin     No Known Problems Sister     No Known Problems Daughter     No Known Problems Sister     No Known Problems Brother     No Known Problems Brother     Cancer Brother         thyroid cancer    Melanoma Neg Hx     Psoriasis Neg Hx     Lupus Neg Hx     Eczema Neg Hx     Colon cancer Neg Hx     Colon polyps Neg Hx     Crohn's disease Neg Hx     Ulcerative colitis Neg Hx     Celiac disease Neg Hx        Social History     Socioeconomic History    Marital status: Significant Other     Spouse name: Not on file    Number of children: Not on file    Years of  education: Not on file    Highest education level: Not on file   Occupational History     Employer: Disabled   Social Needs    Financial resource strain: Not on file    Food insecurity:     Worry: Not on file     Inability: Not on file    Transportation needs:     Medical: Not on file     Non-medical: Not on file   Tobacco Use    Smoking status: Former Smoker     Years: 40.00     Types: Cigarettes     Last attempt to quit: 2010     Years since quittin.4    Smokeless tobacco: Never Used   Substance and Sexual Activity    Alcohol use: No     Comment: stopped ETOH in     Drug use: No     Comment: THC in youth    Sexual activity: Yes     Partners: Female     Birth control/protection: None   Lifestyle    Physical activity:     Days per week: Not on file     Minutes per session: Not on file    Stress: Not on file   Relationships    Social connections:     Talks on phone: Not on file     Gets together: Not on file     Attends [employer] service: Not on file     Active member of club or organization: Not on file     Attends meetings of clubs or organizations: Not on file     Relationship status: Not on file   Other Topics Concern    Not on file   Social History Narrative    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    Medications:    Current Outpatient Medications:     aspirin (ECOTRIN) 81 MG EC tablet, Take 1 tablet (81 mg total) by mouth once daily., Disp: , Rfl: 0    calcitRIOL (ROCALTROL) 0.5 MCG Cap, Take 1 capsule (0.5 mcg total) by mouth once daily., Disp: 30 capsule, Rfl: 11    cephALEXin (KEFLEX) 500 MG capsule, Take 1 capsule (500 mg total) by mouth every 12 (twelve) hours. for 21 days, Disp: 42 capsule, Rfl: 0    famotidine (PEPCID) 20 MG tablet, Take 1 tablet (20 mg total) by mouth every evening., Disp: 90 tablet, Rfl: 3    FLUAD 3087-3224, 65 YR UP,,PF, 45 mcg (15 mcg x 3)/0.5 mL Syrg, PHARMACIST ADMINISTERED IMMUNIZATION  ADMINISTERED AT TIME OF DISPENSING, Disp: , Rfl: 0    ketoconazole (NIZORAL) 200 mg Tab, Take 0.5 tablets (100 mg total) by mouth once daily., Disp: 45 tablet, Rfl: 3    levothyroxine (SYNTHROID) 100 MCG tablet, Take 1 tablet (100 mcg total) by mouth once daily., Disp: 90 tablet, Rfl: 3    magnesium oxide (MAG-OX) 400 mg (241.3 mg magnesium) tablet, Take 1 tablet (400 mg total) by mouth once daily., Disp: 90 tablet, Rfl: 3    metoprolol tartrate (LOPRESSOR) 25 MG tablet, Take 0.5 tablets (12.5 mg total) by mouth 2 (two) times daily., Disp: 90 tablet, Rfl: 3    multivitamin (ONE DAILY MULTIVITAMIN) per tablet, Take 1 tablet by mouth once daily., Disp: , Rfl:     mycophenolate (CELLCEPT) 250 mg Cap, Take 3 capsules (750 mg total) by mouth 2 (two) times daily. Z94.0/Kidney transplant on 11/26/16, Disp: 180 capsule, Rfl: 11    mycophenolate (CELLCEPT) 250 mg Cap, , Disp: , Rfl:     oxyCODONE (ROXICODONE) 5 MG immediate release tablet, Take 1 tablet (5 mg total) by mouth every 6 (six) hours as needed for Pain., Disp: 11 tablet, Rfl: 0    pep injection, Inject 0.25 ml as directed.  May increase by 0.05 ml such as 0.30, 0.35 ml etc. Up to 1.00 ml Until adequate result is achieved.  For compounding pharmacy use:  Add PAPAVERINE 30 mcg Add PHENTOLAMINE 10 mg Add ALPROSTADIL 100 mcg, Disp: 1 vial, Rfl: 2    predniSONE (DELTASONE) 5 MG tablet, Take 1 tablet (5 mg total) by mouth once daily. Z94.0/Kidney transplant on 11/26/2016, Disp: 90 tablet, Rfl: 3    sodium bicarbonate 650 MG tablet, Take 1 tablet (650 mg total) by mouth 2 (two) times daily., Disp: 540 tablet, Rfl: 3    tacrolimus (PROGRAF) 1 MG Cap, Take 3 capsules (3 mg total) by mouth every morning AND 2 capsules (2 mg total) every evening. Z94.0/Kidney Transplant on 11/26/16., Disp: 450 capsule, Rfl: 3    amoxicillin (AMOXIL) 500 MG capsule, Take 1 capsule (500 mg total) by mouth every 12 (twelve) hours., Disp: 6 capsule, Rfl: 0  No current  facility-administered medications for this visit.     PHYSICAL EXAMINATION:    Constitutional: He appears well-developed and well-nourished.  He is in no apparent distress.    Eyes: No scleral icterus noted bilaterally. No discharge bilaterally.    Nose: No rhinorrhea    Cardiovascular: Normal rate.  No pitting edema noted in lower extremities bilaterally    Pulmonary/Chest: Effort normal. No respiratory distress.     Abdominal:  He exhibits no distension.  There is no CVA tenderness.     Lymphadenopathy:        Right: No supraclavicular adenopathy present.        Left: No supraclavicular adenopathy present.     Neurological: He is alert and oriented to person, place, and time.     Skin: Skin is warm and dry.     Psych: Cooperative with normal affect.    Genitourinary: The penis is circumcised.     Physical Exam      LABS:    Lab Results   Component Value Date    PSA 0.41 10/18/2016    PSA 0.32 10/20/2015    PSA 0.45 11/04/2014     RUG 10/15/19  There is a 1.3 cm stricture at the proximal penile urethra.  This stricture measures around 3 mm at its most narrow diameter.  There are no areas of contrast extravasation to suggest leak.  No mass or soft tissue inflammation surrounding the visualized urethra.    Post evacuation imaging is unremarkable.    Procedure  Voiding Trial is done.  200 cc sterile water instilled in the bladder.  Barker catheter removed.  Patient was able to void spontaneously.    IMPRESSION:    Postprocedural stricture of anterior urethra    Urinary tract infection associated with indwelling urethral catheter, initial encounter  -     amoxicillin (AMOXIL) 500 MG capsule; Take 1 capsule (500 mg total) by mouth every 12 (twelve) hours.  Dispense: 6 capsule; Refill: 0      PLAN:    Voiding trial today.  Finish abx and take additional abx amoxil for 3 more days.    Follow up:  Follow up in about 4 months (around 3/26/2020).

## 2019-11-26 NOTE — LETTER
November 26, 2019      Carmen Krueger MD  [address]  [facility] [address]           [facility] Urology 4th Floor  [address]  [facility] [address]  Phone: [phone]          Patient: Alin Burkett   MR Number: [id number]   YOB: 1950   Date of Visit: 11/26/2019       Dear Dr. Carmen Krueger:    Thank you for referring Alin Burkett to me for evaluation. Attached you will find relevant portions of my assessment and plan of care.    If you have questions, please do not hesitate to call me. I look forward to following Alin Burkett along with you.    Sincerely,    Dewey Mann MD    Enclosure  CC:  No Recipients    If you would like to receive this communication electronically, please contact [email or web address] or [phone] to request more information on [employer] Link access.    For providers and/or their staff who would like to refer a patient to [facility], please contact us through our one-stop-shop provider referral line, [facility], at [phone].    If you feel you have received this communication in error or would no longer like to receive these types of communications, please e-mail [email or web address]

## 2020-01-01 ENCOUNTER — OFFICE VISIT (OUTPATIENT)
Dept: OPHTHALMOLOGY | Facility: CLINIC | Age: 70
End: 2020-01-01
Payer: MEDICARE

## 2020-01-01 ENCOUNTER — LAB VISIT (OUTPATIENT)
Dept: PRIMARY CARE CLINIC | Facility: CLINIC | Age: 70
End: 2020-01-01
Payer: MEDICARE

## 2020-01-01 ENCOUNTER — TELEPHONE (OUTPATIENT)
Dept: HEMATOLOGY/ONCOLOGY | Facility: CLINIC | Age: 70
End: 2020-01-01

## 2020-01-01 ENCOUNTER — TELEPHONE (OUTPATIENT)
Dept: SURGERY | Facility: CLINIC | Age: 70
End: 2020-01-01

## 2020-01-01 ENCOUNTER — LAB VISIT (OUTPATIENT)
Dept: LAB | Facility: HOSPITAL | Age: 70
End: 2020-01-01
Attending: INTERNAL MEDICINE
Payer: MEDICARE

## 2020-01-01 ENCOUNTER — DOCUMENTATION ONLY (OUTPATIENT)
Dept: PHARMACY | Facility: HOSPITAL | Age: 70
End: 2020-01-01

## 2020-01-01 ENCOUNTER — HOSPITAL ENCOUNTER (OUTPATIENT)
Dept: UROLOGY | Facility: HOSPITAL | Age: 70
Discharge: HOME | End: 2020-03-05
Attending: UROLOGY
Payer: MEDICARE

## 2020-01-01 ENCOUNTER — TELEPHONE (OUTPATIENT)
Dept: UROLOGY | Facility: CLINIC | Age: 70
End: 2020-01-01

## 2020-01-01 ENCOUNTER — CLINICAL SUPPORT (OUTPATIENT)
Dept: HEMATOLOGY/ONCOLOGY | Facility: CLINIC | Age: 70
End: 2020-01-01
Payer: MEDICARE

## 2020-01-01 ENCOUNTER — PATIENT MESSAGE (OUTPATIENT)
Dept: ADMINISTRATIVE | Facility: HOSPITAL | Age: 70
End: 2020-01-01

## 2020-01-01 ENCOUNTER — HOSPITAL ENCOUNTER (EMERGENCY)
Facility: HOSPITAL | Age: 70
Discharge: HOME | End: 2020-10-10
Attending: EMERGENCY MEDICINE
Payer: MEDICARE

## 2020-01-01 ENCOUNTER — TELEPHONE (OUTPATIENT)
Dept: NEPHROLOGY | Facility: CLINIC | Age: 70
End: 2020-01-01

## 2020-01-01 ENCOUNTER — HOSPITAL ENCOUNTER (EMERGENCY)
Facility: HOSPITAL | Age: 70
Discharge: HOME | End: 2020-08-06
Attending: EMERGENCY MEDICINE
Payer: MEDICARE

## 2020-01-01 ENCOUNTER — HOSPITAL ENCOUNTER (OUTPATIENT)
Facility: HOSPITAL | Age: 70
Discharge: HOME | End: 2020-07-13
Attending: SURGERY | Admitting: SURGERY
Payer: MEDICARE

## 2020-01-01 ENCOUNTER — OFFICE VISIT (OUTPATIENT)
Dept: HEMATOLOGY/ONCOLOGY | Facility: CLINIC | Age: 70
End: 2020-01-01
Payer: MEDICARE

## 2020-01-01 ENCOUNTER — TELEPHONE (OUTPATIENT)
Dept: TRANSPLANT | Facility: CLINIC | Age: 70
End: 2020-01-01

## 2020-01-01 ENCOUNTER — LAB VISIT (OUTPATIENT)
Dept: LAB | Facility: HOSPITAL | Age: 70
End: 2020-01-01
Attending: SPECIALIST
Payer: MEDICARE

## 2020-01-01 ENCOUNTER — TELEPHONE (OUTPATIENT)
Dept: INTERVENTIONAL RADIOLOGY/VASCULAR | Facility: HOSPITAL | Age: 70
End: 2020-01-01

## 2020-01-01 ENCOUNTER — TELEPHONE (OUTPATIENT)
Dept: INFECTIOUS DISEASES | Facility: CLINIC | Age: 70
End: 2020-01-01

## 2020-01-01 ENCOUNTER — OUTSIDE PLACE OF SERVICE (OUTPATIENT)
Dept: INFECTIOUS DISEASES | Facility: CLINIC | Age: 70
End: 2020-01-01
Payer: MEDICARE

## 2020-01-01 ENCOUNTER — NURSE TRIAGE (OUTPATIENT)
Dept: ADMINISTRATIVE | Facility: CLINIC | Age: 70
End: 2020-01-01

## 2020-01-01 ENCOUNTER — HOSPITAL ENCOUNTER (OUTPATIENT)
Facility: HOSPITAL | Age: 70
Discharge: HOME | End: 2020-01-09
Attending: EMERGENCY MEDICINE | Admitting: EMERGENCY MEDICINE
Payer: MEDICARE

## 2020-01-01 ENCOUNTER — OFFICE VISIT (OUTPATIENT)
Dept: INFECTIOUS DISEASES | Facility: CLINIC | Age: 70
End: 2020-01-01
Payer: MEDICARE

## 2020-01-01 ENCOUNTER — OFFICE VISIT (OUTPATIENT)
Dept: UROLOGY | Facility: CLINIC | Age: 70
End: 2020-01-01
Payer: MEDICARE

## 2020-01-01 ENCOUNTER — HOSPITAL ENCOUNTER (OUTPATIENT)
Dept: RADIOLOGY | Facility: HOSPITAL | Age: 70
Discharge: HOME | End: 2020-07-08
Attending: INTERNAL MEDICINE
Payer: MEDICARE

## 2020-01-01 ENCOUNTER — HOSPITAL ENCOUNTER (OUTPATIENT)
Facility: HOSPITAL | Age: 70
Discharge: HOME | End: 2020-07-27
Attending: SURGERY | Admitting: SURGERY
Payer: MEDICARE

## 2020-01-01 ENCOUNTER — ANESTHESIA (OUTPATIENT)
Dept: SURGERY | Facility: HOSPITAL | Age: 70
End: 2020-01-01
Payer: MEDICARE

## 2020-01-01 ENCOUNTER — INFUSION (OUTPATIENT)
Dept: INFUSION THERAPY | Facility: HOSPITAL | Age: 70
End: 2020-01-01
Attending: INTERNAL MEDICINE
Payer: MEDICARE

## 2020-01-01 ENCOUNTER — PATIENT MESSAGE (OUTPATIENT)
Dept: TRANSPLANT | Facility: CLINIC | Age: 70
End: 2020-01-01

## 2020-01-01 ENCOUNTER — ANESTHESIA (OUTPATIENT)
Dept: SURGERY | Facility: HOSPITAL | Age: 70
DRG: 853 | End: 2020-01-01
Payer: MEDICARE

## 2020-01-01 ENCOUNTER — TELEPHONE (OUTPATIENT)
Dept: OPHTHALMOLOGY | Facility: CLINIC | Age: 70
End: 2020-01-01

## 2020-01-01 ENCOUNTER — CLINICAL SUPPORT (OUTPATIENT)
Dept: CARDIOLOGY | Facility: CLINIC | Age: 70
End: 2020-01-01
Attending: INTERNAL MEDICINE
Payer: MEDICARE

## 2020-01-01 ENCOUNTER — LAB VISIT (OUTPATIENT)
Dept: LAB | Facility: HOSPITAL | Age: 70
End: 2020-01-01
Payer: MEDICARE

## 2020-01-01 ENCOUNTER — ANESTHESIA EVENT (OUTPATIENT)
Dept: SURGERY | Facility: HOSPITAL | Age: 70
DRG: 853 | End: 2020-01-01
Payer: MEDICARE

## 2020-01-01 ENCOUNTER — ANESTHESIA (OUTPATIENT)
Dept: ENDOSCOPY | Facility: HOSPITAL | Age: 70
DRG: 853 | End: 2020-01-01
Payer: MEDICARE

## 2020-01-01 ENCOUNTER — HOSPITAL ENCOUNTER (INPATIENT)
Facility: HOSPITAL | Age: 70
LOS: 24 days | DRG: 853 | End: 2020-11-08
Attending: EMERGENCY MEDICINE | Admitting: HOSPITALIST
Payer: MEDICARE

## 2020-01-01 ENCOUNTER — HOSPITAL ENCOUNTER (OUTPATIENT)
Facility: HOSPITAL | Age: 70
Discharge: HOME | End: 2020-06-30
Attending: RADIOLOGY | Admitting: RADIOLOGY
Payer: MEDICARE

## 2020-01-01 ENCOUNTER — INITIAL CONSULT (OUTPATIENT)
Dept: SURGERY | Facility: CLINIC | Age: 70
End: 2020-01-01
Payer: MEDICARE

## 2020-01-01 ENCOUNTER — HOSPITAL ENCOUNTER (INPATIENT)
Facility: HOSPITAL | Age: 70
LOS: 4 days | Discharge: HOME HEALTH | DRG: 872 | End: 2020-02-04
Attending: EMERGENCY MEDICINE | Admitting: EMERGENCY MEDICINE
Payer: MEDICARE

## 2020-01-01 ENCOUNTER — ANESTHESIA EVENT (OUTPATIENT)
Dept: SURGERY | Facility: HOSPITAL | Age: 70
End: 2020-01-01
Payer: MEDICARE

## 2020-01-01 ENCOUNTER — ANESTHESIA EVENT (OUTPATIENT)
Dept: ENDOSCOPY | Facility: HOSPITAL | Age: 70
DRG: 853 | End: 2020-01-01
Payer: MEDICARE

## 2020-01-01 VITALS
BODY MASS INDEX: 21.56 KG/M2 | HEART RATE: 73 BPM | RESPIRATION RATE: 16 BRPM | OXYGEN SATURATION: 98 % | TEMPERATURE: 98 F | TEMPERATURE: 98 F | SYSTOLIC BLOOD PRESSURE: 156 MMHG | HEART RATE: 72 BPM | HEIGHT: 71 IN | DIASTOLIC BLOOD PRESSURE: 87 MMHG | DIASTOLIC BLOOD PRESSURE: 83 MMHG | SYSTOLIC BLOOD PRESSURE: 123 MMHG | WEIGHT: 154 LBS

## 2020-01-01 VITALS
TEMPERATURE: 99 F | HEIGHT: 71 IN | DIASTOLIC BLOOD PRESSURE: 82 MMHG | WEIGHT: 155 LBS | BODY MASS INDEX: 21.7 KG/M2 | SYSTOLIC BLOOD PRESSURE: 125 MMHG | HEART RATE: 67 BPM

## 2020-01-01 VITALS
HEART RATE: 50 BPM | SYSTOLIC BLOOD PRESSURE: 102 MMHG | DIASTOLIC BLOOD PRESSURE: 69 MMHG | BODY MASS INDEX: 21.16 KG/M2 | WEIGHT: 151.13 LBS | OXYGEN SATURATION: 99 % | TEMPERATURE: 99 F | HEIGHT: 71 IN | RESPIRATION RATE: 16 BRPM

## 2020-01-01 VITALS
SYSTOLIC BLOOD PRESSURE: 138 MMHG | TEMPERATURE: 99 F | DIASTOLIC BLOOD PRESSURE: 75 MMHG | BODY MASS INDEX: 21.54 KG/M2 | HEIGHT: 71 IN | RESPIRATION RATE: 16 BRPM | WEIGHT: 153.88 LBS | HEART RATE: 65 BPM | OXYGEN SATURATION: 100 %

## 2020-01-01 VITALS
HEIGHT: 71 IN | DIASTOLIC BLOOD PRESSURE: 78 MMHG | HEART RATE: 72 BPM | WEIGHT: 155.44 LBS | OXYGEN SATURATION: 96 % | TEMPERATURE: 98 F | BODY MASS INDEX: 21.76 KG/M2 | RESPIRATION RATE: 18 BRPM | SYSTOLIC BLOOD PRESSURE: 130 MMHG

## 2020-01-01 VITALS
SYSTOLIC BLOOD PRESSURE: 108 MMHG | BODY MASS INDEX: 20.61 KG/M2 | DIASTOLIC BLOOD PRESSURE: 59 MMHG | TEMPERATURE: 99 F | WEIGHT: 147.25 LBS | HEART RATE: 62 BPM | RESPIRATION RATE: 20 BRPM | OXYGEN SATURATION: 98 % | HEIGHT: 71 IN

## 2020-01-01 VITALS
WEIGHT: 155.63 LBS | OXYGEN SATURATION: 98 % | BODY MASS INDEX: 21.79 KG/M2 | DIASTOLIC BLOOD PRESSURE: 78 MMHG | OXYGEN SATURATION: 100 % | HEIGHT: 71 IN | WEIGHT: 154 LBS | RESPIRATION RATE: 16 BRPM | SYSTOLIC BLOOD PRESSURE: 107 MMHG | BODY MASS INDEX: 21.56 KG/M2 | RESPIRATION RATE: 18 BRPM | TEMPERATURE: 98 F | HEART RATE: 64 BPM | SYSTOLIC BLOOD PRESSURE: 132 MMHG | TEMPERATURE: 98 F | HEIGHT: 71 IN | HEART RATE: 71 BPM | DIASTOLIC BLOOD PRESSURE: 80 MMHG

## 2020-01-01 VITALS
HEIGHT: 71 IN | TEMPERATURE: 98 F | BODY MASS INDEX: 21 KG/M2 | RESPIRATION RATE: 18 BRPM | DIASTOLIC BLOOD PRESSURE: 77 MMHG | HEART RATE: 77 BPM | OXYGEN SATURATION: 99 % | WEIGHT: 150 LBS | SYSTOLIC BLOOD PRESSURE: 124 MMHG

## 2020-01-01 VITALS
BODY MASS INDEX: 20.58 KG/M2 | DIASTOLIC BLOOD PRESSURE: 91 MMHG | HEART RATE: 69 BPM | SYSTOLIC BLOOD PRESSURE: 135 MMHG | HEIGHT: 71 IN | WEIGHT: 147 LBS

## 2020-01-01 VITALS — HEIGHT: 71 IN | BODY MASS INDEX: 21.58 KG/M2 | WEIGHT: 154.13 LBS

## 2020-01-01 VITALS
HEIGHT: 71 IN | BODY MASS INDEX: 20.49 KG/M2 | HEART RATE: 95 BPM | RESPIRATION RATE: 18 BRPM | DIASTOLIC BLOOD PRESSURE: 76 MMHG | TEMPERATURE: 99 F | OXYGEN SATURATION: 98 % | WEIGHT: 146.38 LBS | BODY MASS INDEX: 21.14 KG/M2 | DIASTOLIC BLOOD PRESSURE: 63 MMHG | HEART RATE: 56 BPM | TEMPERATURE: 98 F | HEIGHT: 71 IN | WEIGHT: 151 LBS | SYSTOLIC BLOOD PRESSURE: 115 MMHG | RESPIRATION RATE: 20 BRPM | SYSTOLIC BLOOD PRESSURE: 102 MMHG

## 2020-01-01 VITALS
WEIGHT: 146.81 LBS | RESPIRATION RATE: 18 BRPM | HEART RATE: 61 BPM | HEIGHT: 71 IN | TEMPERATURE: 98 F | BODY MASS INDEX: 20.55 KG/M2 | SYSTOLIC BLOOD PRESSURE: 140 MMHG | DIASTOLIC BLOOD PRESSURE: 84 MMHG

## 2020-01-01 VITALS
WEIGHT: 155 LBS | DIASTOLIC BLOOD PRESSURE: 79 MMHG | SYSTOLIC BLOOD PRESSURE: 129 MMHG | RESPIRATION RATE: 20 BRPM | HEART RATE: 70 BPM | TEMPERATURE: 99 F | HEIGHT: 71 IN | BODY MASS INDEX: 21.7 KG/M2 | OXYGEN SATURATION: 98 %

## 2020-01-01 VITALS
HEART RATE: 68 BPM | RESPIRATION RATE: 16 BRPM | SYSTOLIC BLOOD PRESSURE: 142 MMHG | BODY MASS INDEX: 21.54 KG/M2 | TEMPERATURE: 98 F | DIASTOLIC BLOOD PRESSURE: 93 MMHG | OXYGEN SATURATION: 92 % | HEIGHT: 71 IN | WEIGHT: 153.88 LBS

## 2020-01-01 VITALS
OXYGEN SATURATION: 100 % | RESPIRATION RATE: 114 BRPM | BODY MASS INDEX: 27.47 KG/M2 | SYSTOLIC BLOOD PRESSURE: 163 MMHG | WEIGHT: 196.19 LBS | TEMPERATURE: 97 F | DIASTOLIC BLOOD PRESSURE: 103 MMHG | HEART RATE: 101 BPM | HEIGHT: 71 IN

## 2020-01-01 VITALS
SYSTOLIC BLOOD PRESSURE: 116 MMHG | OXYGEN SATURATION: 100 % | DIASTOLIC BLOOD PRESSURE: 66 MMHG | WEIGHT: 154 LBS | HEART RATE: 73 BPM | TEMPERATURE: 99 F | HEIGHT: 71 IN | BODY MASS INDEX: 21.56 KG/M2 | RESPIRATION RATE: 16 BRPM

## 2020-01-01 VITALS
RESPIRATION RATE: 16 BRPM | OXYGEN SATURATION: 98 % | BODY MASS INDEX: 21.33 KG/M2 | TEMPERATURE: 98 F | HEART RATE: 70 BPM | WEIGHT: 152.88 LBS | DIASTOLIC BLOOD PRESSURE: 84 MMHG | SYSTOLIC BLOOD PRESSURE: 134 MMHG

## 2020-01-01 VITALS
HEART RATE: 61 BPM | SYSTOLIC BLOOD PRESSURE: 114 MMHG | DIASTOLIC BLOOD PRESSURE: 84 MMHG | WEIGHT: 155 LBS | BODY MASS INDEX: 21.7 KG/M2 | RESPIRATION RATE: 18 BRPM | HEIGHT: 71 IN | OXYGEN SATURATION: 98 % | TEMPERATURE: 99 F

## 2020-01-01 VITALS
HEIGHT: 71 IN | BODY MASS INDEX: 21.26 KG/M2 | SYSTOLIC BLOOD PRESSURE: 106 MMHG | WEIGHT: 151.88 LBS | DIASTOLIC BLOOD PRESSURE: 74 MMHG | TEMPERATURE: 99 F | HEART RATE: 87 BPM

## 2020-01-01 VITALS
WEIGHT: 145 LBS | SYSTOLIC BLOOD PRESSURE: 85 MMHG | TEMPERATURE: 98 F | DIASTOLIC BLOOD PRESSURE: 56 MMHG | RESPIRATION RATE: 18 BRPM | OXYGEN SATURATION: 98 % | BODY MASS INDEX: 20.3 KG/M2 | HEIGHT: 71 IN | HEART RATE: 95 BPM

## 2020-01-01 VITALS
HEIGHT: 71 IN | SYSTOLIC BLOOD PRESSURE: 104 MMHG | OXYGEN SATURATION: 98 % | BODY MASS INDEX: 21.16 KG/M2 | WEIGHT: 151.13 LBS | TEMPERATURE: 98 F | HEART RATE: 65 BPM | DIASTOLIC BLOOD PRESSURE: 64 MMHG | RESPIRATION RATE: 17 BRPM

## 2020-01-01 VITALS
HEART RATE: 64 BPM | RESPIRATION RATE: 18 BRPM | TEMPERATURE: 99 F | OXYGEN SATURATION: 96 % | DIASTOLIC BLOOD PRESSURE: 77 MMHG | SYSTOLIC BLOOD PRESSURE: 135 MMHG

## 2020-01-01 DIAGNOSIS — N39.0 RECURRENT UTI: ICD-10-CM

## 2020-01-01 DIAGNOSIS — R59.0 LYMPHADENOPATHY, INGUINAL: ICD-10-CM

## 2020-01-01 DIAGNOSIS — D47.Z1 POST-TRANSPLANT LYMPHOPROLIFERATIVE DISORDER (PTLD): Primary | ICD-10-CM

## 2020-01-01 DIAGNOSIS — N18.30 ACUTE RENAL FAILURE SUPERIMPOSED ON STAGE 3 CHRONIC KIDNEY DISEASE: ICD-10-CM

## 2020-01-01 DIAGNOSIS — N18.9 ACUTE KIDNEY INJURY SUPERIMPOSED ON CHRONIC KIDNEY DISEASE: ICD-10-CM

## 2020-01-01 DIAGNOSIS — N17.9 ACUTE RENAL FAILURE SUPERIMPOSED ON STAGE 3 CHRONIC KIDNEY DISEASE: ICD-10-CM

## 2020-01-01 DIAGNOSIS — R00.0 TACHYCARDIA: ICD-10-CM

## 2020-01-01 DIAGNOSIS — N18.30 ANEMIA IN STAGE 3 CHRONIC KIDNEY DISEASE, UNSPECIFIED WHETHER STAGE 3A OR 3B CKD: ICD-10-CM

## 2020-01-01 DIAGNOSIS — D47.Z1 POST-TRANSPLANT LYMPHOPROLIFERATIVE DISORDER (PTLD): ICD-10-CM

## 2020-01-01 DIAGNOSIS — N32.3 BLADDER DIVERTICULUM: ICD-10-CM

## 2020-01-01 DIAGNOSIS — T45.1X5A ANEMIA ASSOCIATED WITH CHEMOTHERAPY: ICD-10-CM

## 2020-01-01 DIAGNOSIS — S51.012A LACERATION OF LEFT ELBOW, INITIAL ENCOUNTER: ICD-10-CM

## 2020-01-01 DIAGNOSIS — Z94.0 KIDNEY REPLACED BY TRANSPLANT: ICD-10-CM

## 2020-01-01 DIAGNOSIS — Z94.0 IMMUNOSUPPRESSIVE MANAGEMENT ENCOUNTER FOLLOWING KIDNEY TRANSPLANT: ICD-10-CM

## 2020-01-01 DIAGNOSIS — N18.30 STAGE 3 CHRONIC KIDNEY DISEASE: ICD-10-CM

## 2020-01-01 DIAGNOSIS — K92.2 LOWER GI BLEEDING: ICD-10-CM

## 2020-01-01 DIAGNOSIS — H25.11 NUCLEAR SCLEROTIC CATARACT OF RIGHT EYE: ICD-10-CM

## 2020-01-01 DIAGNOSIS — D63.1 ANEMIA IN STAGE 3 CHRONIC KIDNEY DISEASE, UNSPECIFIED WHETHER STAGE 3A OR 3B CKD: ICD-10-CM

## 2020-01-01 DIAGNOSIS — Z94.0 S/P KIDNEY TRANSPLANT: ICD-10-CM

## 2020-01-01 DIAGNOSIS — N35.914 STRICTURE OF ANTERIOR URETHRA IN MALE, UNSPECIFIED STRICTURE TYPE: ICD-10-CM

## 2020-01-01 DIAGNOSIS — C83.38 DIFFUSE LARGE B-CELL LYMPHOMA OF LYMPH NODES OF MULTIPLE REGIONS: ICD-10-CM
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
## 2020-01-01 DIAGNOSIS — B96.20 E COLI BACTEREMIA: ICD-10-CM

## 2020-01-01 DIAGNOSIS — A49.8 PSEUDOMONAS AERUGINOSA INFECTION: ICD-10-CM

## 2020-01-01 DIAGNOSIS — F41.9 ANXIETY: ICD-10-CM

## 2020-01-01 DIAGNOSIS — R59.1 LYMPHADENOPATHY: Primary | ICD-10-CM

## 2020-01-01 DIAGNOSIS — Z01.818 PRE-OP TESTING: ICD-10-CM

## 2020-01-01 DIAGNOSIS — N18.9 CHRONIC KIDNEY DISEASE, UNSPECIFIED: Primary | ICD-10-CM

## 2020-01-01 DIAGNOSIS — C83.38 DIFFUSE LARGE B-CELL LYMPHOMA OF LYMPH NODES OF MULTIPLE REGIONS: Primary | ICD-10-CM

## 2020-01-01 DIAGNOSIS — R59.0 INGUINAL ADENOPATHY: ICD-10-CM

## 2020-01-01 DIAGNOSIS — C85.18 B-CELL LYMPHOMA OF LYMPH NODES OF MULTIPLE SITES: Primary | ICD-10-CM

## 2020-01-01 DIAGNOSIS — E79.0 HYPERURICEMIA: ICD-10-CM

## 2020-01-01 DIAGNOSIS — C83.08 SMALL CELL B-CELL LYMPHOMA OF LYMPH NODES OF MULTIPLE SITES: Primary | ICD-10-CM

## 2020-01-01 DIAGNOSIS — T86.10 COMPLICATION OF TRANSPLANTED KIDNEY, UNSPECIFIED COMPLICATION: ICD-10-CM

## 2020-01-01 DIAGNOSIS — N30.00 ACUTE CYSTITIS WITHOUT HEMATURIA: Primary | ICD-10-CM

## 2020-01-01 DIAGNOSIS — D64.81 ANEMIA ASSOCIATED WITH CHEMOTHERAPY: ICD-10-CM

## 2020-01-01 DIAGNOSIS — N30.00 ACUTE CYSTITIS WITHOUT HEMATURIA: ICD-10-CM

## 2020-01-01 DIAGNOSIS — N30.90 CYSTITIS: Primary | ICD-10-CM

## 2020-01-01 DIAGNOSIS — D69.6 THROMBOCYTOPENIA: ICD-10-CM

## 2020-01-01 DIAGNOSIS — N18.30 STAGE 3 CHRONIC KIDNEY DISEASE: Chronic | ICD-10-CM

## 2020-01-01 DIAGNOSIS — R89.7 ABNORMAL BIOPSY RESULT: ICD-10-CM

## 2020-01-01 DIAGNOSIS — C85.18 B-CELL LYMPHOMA OF LYMPH NODES OF MULTIPLE REGIONS, UNSPECIFIED B-CELL LYMPHOMA TYPE: ICD-10-CM

## 2020-01-01 DIAGNOSIS — R07.9 CHEST PAIN: ICD-10-CM

## 2020-01-01 DIAGNOSIS — C82.93 NODULAR LYMPHOMA OF INTRA-ABDOMINAL LYMPH NODES: ICD-10-CM

## 2020-01-01 DIAGNOSIS — R05.9 COUGH: Primary | ICD-10-CM

## 2020-01-01 DIAGNOSIS — A04.72 C. DIFFICILE COLITIS: ICD-10-CM

## 2020-01-01 DIAGNOSIS — R10.32 LEFT GROIN PAIN: Primary | ICD-10-CM

## 2020-01-01 DIAGNOSIS — T45.1X5A ANEMIA DUE TO CHEMOTHERAPY: ICD-10-CM

## 2020-01-01 DIAGNOSIS — R30.0 DYSURIA: Primary | ICD-10-CM

## 2020-01-01 DIAGNOSIS — A41.9 SEPSIS, DUE TO UNSPECIFIED ORGANISM, UNSPECIFIED WHETHER ACUTE ORGAN DYSFUNCTION PRESENT: Primary | ICD-10-CM

## 2020-01-01 DIAGNOSIS — R93.89 ABNORMAL FINDING ON IMAGING: Primary | ICD-10-CM

## 2020-01-01 DIAGNOSIS — C83.00 SMALL CELL B-CELL LYMPHOMA: ICD-10-CM

## 2020-01-01 DIAGNOSIS — Z00.00 ROUTINE GENERAL MEDICAL EXAMINATION AT A HEALTH CARE FACILITY: ICD-10-CM

## 2020-01-01 DIAGNOSIS — R30.0 DYSURIA: ICD-10-CM

## 2020-01-01 DIAGNOSIS — R59.1 LYMPHADENOPATHY: ICD-10-CM

## 2020-01-01 DIAGNOSIS — N12 PYELONEPHRITIS: Primary | ICD-10-CM

## 2020-01-01 DIAGNOSIS — Z94.0 DECEASED-DONOR KIDNEY TRANSPLANT: ICD-10-CM

## 2020-01-01 DIAGNOSIS — D84.9 IMMUNOCOMPROMISED STATE: ICD-10-CM

## 2020-01-01 DIAGNOSIS — E03.9 ACQUIRED HYPOTHYROIDISM: ICD-10-CM

## 2020-01-01 DIAGNOSIS — Z79.899 IMMUNOSUPPRESSIVE MANAGEMENT ENCOUNTER FOLLOWING KIDNEY TRANSPLANT: Chronic | ICD-10-CM

## 2020-01-01 DIAGNOSIS — Z90.49 S/P COLECTOMY: ICD-10-CM

## 2020-01-01 DIAGNOSIS — S51.811A LACERATION OF RIGHT FOREARM, INITIAL ENCOUNTER: ICD-10-CM

## 2020-01-01 DIAGNOSIS — K62.5 RECTAL BLEEDING: Primary | ICD-10-CM

## 2020-01-01 DIAGNOSIS — T45.1X5A PANCYTOPENIA DUE TO ANTINEOPLASTIC CHEMOTHERAPY: ICD-10-CM

## 2020-01-01 DIAGNOSIS — R78.81 BACTEREMIA: ICD-10-CM

## 2020-01-01 DIAGNOSIS — N17.9 ACUTE KIDNEY INJURY: ICD-10-CM

## 2020-01-01 DIAGNOSIS — Z93.2 S/P ILEOSTOMY: ICD-10-CM

## 2020-01-01 DIAGNOSIS — C85.18 B-CELL LYMPHOMA OF LYMPH NODES OF MULTIPLE REGIONS, UNSPECIFIED B-CELL LYMPHOMA TYPE: Primary | ICD-10-CM

## 2020-01-01 DIAGNOSIS — C83.08 SMALL CELL B-CELL LYMPHOMA OF LYMPH NODES OF MULTIPLE SITES: ICD-10-CM

## 2020-01-01 DIAGNOSIS — Z13.9 SCREENING FOR CONDITION: ICD-10-CM

## 2020-01-01 DIAGNOSIS — N18.32 STAGE 3B CHRONIC KIDNEY DISEASE: ICD-10-CM

## 2020-01-01 DIAGNOSIS — R10.32 LEFT LOWER QUADRANT ABDOMINAL PAIN: ICD-10-CM

## 2020-01-01 DIAGNOSIS — R80.9 PROTEINURIA, UNSPECIFIED TYPE: ICD-10-CM
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
## 2020-01-01 DIAGNOSIS — A04.72 COLITIS DUE TO CLOSTRIDIUM DIFFICILE: Primary | ICD-10-CM

## 2020-01-01 DIAGNOSIS — R59.0 INGUINAL ADENOPATHY: Primary | ICD-10-CM

## 2020-01-01 DIAGNOSIS — R78.81 E COLI BACTEREMIA: ICD-10-CM

## 2020-01-01 DIAGNOSIS — D49.89 NEOPLASM OF ABDOMEN: ICD-10-CM

## 2020-01-01 DIAGNOSIS — R17 TOTAL BILIRUBIN, ELEVATED: ICD-10-CM

## 2020-01-01 DIAGNOSIS — N39.0 COMPLICATED UTI (URINARY TRACT INFECTION): Primary | ICD-10-CM

## 2020-01-01 DIAGNOSIS — N39.0 RECURRENT UTI: Primary | ICD-10-CM

## 2020-01-01 DIAGNOSIS — R78.81 E COLI BACTEREMIA: Primary | ICD-10-CM

## 2020-01-01 DIAGNOSIS — K57.92 DIVERTICULITIS: ICD-10-CM

## 2020-01-01 DIAGNOSIS — D64.9 ANEMIA, UNSPECIFIED: ICD-10-CM

## 2020-01-01 DIAGNOSIS — R00.2 PALPITATIONS: ICD-10-CM

## 2020-01-01 DIAGNOSIS — N30.90 CYSTITIS: ICD-10-CM

## 2020-01-01 DIAGNOSIS — I47.29 PAROXYSMAL VENTRICULAR TACHYCARDIA: ICD-10-CM

## 2020-01-01 DIAGNOSIS — I48.0 PAROXYSMAL ATRIAL FIBRILLATION: Primary | ICD-10-CM

## 2020-01-01 DIAGNOSIS — R93.89 ABNORMAL FINDING ON IMAGING: ICD-10-CM

## 2020-01-01 DIAGNOSIS — A04.72 COLITIS DUE TO CLOSTRIDIUM DIFFICILE: ICD-10-CM

## 2020-01-01 DIAGNOSIS — D61.810 PANCYTOPENIA DUE TO ANTINEOPLASTIC CHEMOTHERAPY: ICD-10-CM

## 2020-01-01 DIAGNOSIS — J18.9 PNEUMONIA DUE TO INFECTIOUS ORGANISM, UNSPECIFIED LATERALITY, UNSPECIFIED PART OF LUNG: ICD-10-CM

## 2020-01-01 DIAGNOSIS — R50.81 FEBRILE NEUTROPENIA: ICD-10-CM

## 2020-01-01 DIAGNOSIS — D70.9 FEBRILE NEUTROPENIA: ICD-10-CM

## 2020-01-01 DIAGNOSIS — Z94.0 KIDNEY REPLACED BY TRANSPLANT: Primary | ICD-10-CM

## 2020-01-01 DIAGNOSIS — N10 PYELONEPHRITIS, ACUTE: ICD-10-CM

## 2020-01-01 DIAGNOSIS — I10 ESSENTIAL HYPERTENSION: ICD-10-CM

## 2020-01-01 DIAGNOSIS — J06.9 VIRAL URI WITH COUGH: ICD-10-CM

## 2020-01-01 DIAGNOSIS — D64.81 ANEMIA DUE TO CHEMOTHERAPY: ICD-10-CM

## 2020-01-01 DIAGNOSIS — K62.5 RECTAL BLEEDING: ICD-10-CM

## 2020-01-01 DIAGNOSIS — N17.9 ACUTE KIDNEY INJURY SUPERIMPOSED ON CHRONIC KIDNEY DISEASE: ICD-10-CM

## 2020-01-01 DIAGNOSIS — R65.21 SEPTIC SHOCK: ICD-10-CM

## 2020-01-01 DIAGNOSIS — Q60.2 CONGENITAL ABSENCE OF KIDNEY: Chronic | ICD-10-CM

## 2020-01-01 DIAGNOSIS — I10 ESSENTIAL HYPERTENSION: Chronic | ICD-10-CM

## 2020-01-01 DIAGNOSIS — E61.1 IRON DEFICIENCY: ICD-10-CM

## 2020-01-01 DIAGNOSIS — S51.011A SKIN TEAR OF RIGHT ELBOW WITHOUT COMPLICATION, INITIAL ENCOUNTER: ICD-10-CM

## 2020-01-01 DIAGNOSIS — A04.72 CLOSTRIDIUM DIFFICILE DIARRHEA: ICD-10-CM

## 2020-01-01 DIAGNOSIS — Z79.899 IMMUNOSUPPRESSIVE MANAGEMENT ENCOUNTER FOLLOWING KIDNEY TRANSPLANT: ICD-10-CM

## 2020-01-01 DIAGNOSIS — A41.9 SEPTIC SHOCK: ICD-10-CM

## 2020-01-01 DIAGNOSIS — R55 SYNCOPE AND COLLAPSE: ICD-10-CM

## 2020-01-01 DIAGNOSIS — N39.0 URINARY TRACT INFECTION WITHOUT HEMATURIA, SITE UNSPECIFIED: ICD-10-CM

## 2020-01-01 DIAGNOSIS — H25.12 NUCLEAR SCLEROTIC CATARACT OF LEFT EYE: Primary | ICD-10-CM

## 2020-01-01 DIAGNOSIS — B96.20 E COLI BACTEREMIA: Primary | ICD-10-CM

## 2020-01-01 DIAGNOSIS — K92.2 GASTROINTESTINAL HEMORRHAGE, UNSPECIFIED GASTROINTESTINAL HEMORRHAGE TYPE: ICD-10-CM

## 2020-01-01 DIAGNOSIS — Z94.0 IMMUNOSUPPRESSIVE MANAGEMENT ENCOUNTER FOLLOWING KIDNEY TRANSPLANT: Chronic | ICD-10-CM

## 2020-01-01 LAB
25(OH)D3+25(OH)D2 SERPL-MCNC: 11 NG/ML (ref 30–96)
ABO + RH BLD: NORMAL
ACANTHOCYTES BLD QL SMEAR: PRESENT
ADENOVIRUS: NOT DETECTED
ADENOVIRUS: NOT DETECTED
ADEQUACY: NORMAL
ALBUMIN SERPL BCP-MCNC: 1 G/DL (ref 3.5–5.2)
ALBUMIN SERPL BCP-MCNC: 1 G/DL (ref 3.5–5.2)
ALBUMIN SERPL BCP-MCNC: 1.4 G/DL (ref 3.5–5.2)
ALBUMIN SERPL BCP-MCNC: 1.5 G/DL (ref 3.5–5.2)
ALBUMIN SERPL BCP-MCNC: 1.7 G/DL (ref 3.5–5.2)
ALBUMIN SERPL BCP-MCNC: 1.8 G/DL (ref 3.5–5.2)
ALBUMIN SERPL BCP-MCNC: 1.8 G/DL (ref 3.5–5.2)
ALBUMIN SERPL BCP-MCNC: 2.4 G/DL (ref 3.5–5.2)
ALBUMIN SERPL BCP-MCNC: 2.4 G/DL (ref 3.5–5.2)
ALBUMIN SERPL BCP-MCNC: 2.6 G/DL (ref 3.5–5.2)
ALBUMIN SERPL BCP-MCNC: 2.7 G/DL (ref 3.5–5.2)
ALBUMIN SERPL BCP-MCNC: 2.8 G/DL (ref 3.5–5.2)
ALBUMIN SERPL BCP-MCNC: 2.8 G/DL (ref 3.5–5.2)
ALBUMIN SERPL BCP-MCNC: 2.9 G/DL (ref 3.5–5.2)
ALBUMIN SERPL BCP-MCNC: 3.1 G/DL (ref 3.5–5.2)
ALBUMIN SERPL BCP-MCNC: 3.2 G/DL (ref 3.5–5.2)
ALBUMIN SERPL BCP-MCNC: 3.3 G/DL (ref 3.5–5.2)
ALBUMIN SERPL BCP-MCNC: 3.3 G/DL (ref 3.5–5.2)
ALBUMIN SERPL BCP-MCNC: 3.4 G/DL (ref 3.5–5.2)
ALBUMIN SERPL BCP-MCNC: 3.5 G/DL (ref 3.5–5.2)
ALBUMIN SERPL BCP-MCNC: 3.6 G/DL (ref 3.5–5.2)
ALBUMIN SERPL BCP-MCNC: 3.7 G/DL (ref 3.5–5.2)
ALLENS TEST: ABNORMAL
ALP SERPL-CCNC: 109 U/L (ref 55–135)
ALP SERPL-CCNC: 154 U/L (ref 55–135)
ALP SERPL-CCNC: 41 U/L (ref 55–135)
ALP SERPL-CCNC: 56 U/L (ref 55–135)
ALP SERPL-CCNC: 56 U/L (ref 55–135)
ALP SERPL-CCNC: 57 U/L (ref 55–135)
ALP SERPL-CCNC: 58 U/L (ref 55–135)
ALP SERPL-CCNC: 58 U/L (ref 55–135)
ALP SERPL-CCNC: 60 U/L (ref 55–135)
ALP SERPL-CCNC: 62 U/L (ref 55–135)
ALP SERPL-CCNC: 62 U/L (ref 55–135)
ALP SERPL-CCNC: 63 U/L (ref 55–135)
ALP SERPL-CCNC: 64 U/L (ref 55–135)
ALP SERPL-CCNC: 65 U/L (ref 55–135)
ALP SERPL-CCNC: 69 U/L (ref 55–135)
ALP SERPL-CCNC: 69 U/L (ref 55–135)
ALP SERPL-CCNC: 71 U/L (ref 55–135)
ALP SERPL-CCNC: 73 U/L (ref 55–135)
ALP SERPL-CCNC: 74 U/L (ref 55–135)
ALP SERPL-CCNC: 75 U/L (ref 55–135)
ALP SERPL-CCNC: 76 U/L (ref 55–135)
ALP SERPL-CCNC: 76 U/L (ref 55–135)
ALP SERPL-CCNC: 92 U/L (ref 55–135)
ALT SERPL W/O P-5'-P-CCNC: 14 U/L (ref 10–44)
ALT SERPL W/O P-5'-P-CCNC: 16 U/L (ref 10–44)
ALT SERPL W/O P-5'-P-CCNC: 17 U/L (ref 10–44)
ALT SERPL W/O P-5'-P-CCNC: 17 U/L (ref 10–44)
ALT SERPL W/O P-5'-P-CCNC: 18 U/L (ref 10–44)
ALT SERPL W/O P-5'-P-CCNC: 19 U/L (ref 10–44)
ALT SERPL W/O P-5'-P-CCNC: 26 U/L (ref 10–44)
ALT SERPL W/O P-5'-P-CCNC: 27 U/L (ref 10–44)
ALT SERPL W/O P-5'-P-CCNC: 28 U/L (ref 10–44)
ALT SERPL W/O P-5'-P-CCNC: 30 U/L (ref 10–44)
ALT SERPL W/O P-5'-P-CCNC: 40 U/L (ref 10–44)
ALT SERPL W/O P-5'-P-CCNC: 40 U/L (ref 10–44)
ALT SERPL W/O P-5'-P-CCNC: 42 U/L (ref 10–44)
ALT SERPL W/O P-5'-P-CCNC: 902 U/L (ref 10–44)
ANION GAP SERPL CALC-SCNC: 10 MMOL/L (ref 8–16)
ANION GAP SERPL CALC-SCNC: 11 MMOL/L (ref 8–16)
ANION GAP SERPL CALC-SCNC: 12 MMOL/L (ref 8–16)
ANION GAP SERPL CALC-SCNC: 13 MMOL/L (ref 8–16)
ANION GAP SERPL CALC-SCNC: 19 MMOL/L (ref 8–16)
ANION GAP SERPL CALC-SCNC: 19 MMOL/L (ref 8–16)
ANION GAP SERPL CALC-SCNC: 6 MMOL/L (ref 8–16)
ANION GAP SERPL CALC-SCNC: 7 MMOL/L (ref 8–16)
ANION GAP SERPL CALC-SCNC: 8 MMOL/L (ref 8–16)
ANION GAP SERPL CALC-SCNC: 9 MMOL/L (ref 8–16)
ANION GAP SERPL CALC-SCNC: ABNORMAL MMOL/L (ref 8–16)
ANION GAP SERPL CALC-SCNC: ABNORMAL MMOL/L (ref 8–16)
ANISOCYTOSIS BLD QL SMEAR: ABNORMAL
ANISOCYTOSIS BLD QL SMEAR: ABNORMAL
ANISOCYTOSIS BLD QL SMEAR: SLIGHT
APTT BLDCRRT: 27.4 SEC (ref 21–32)
APTT BLDCRRT: 35.7 SEC (ref 21–32)
ASCENDING AORTA: 2.93 CM
AST SERPL-CCNC: 12 U/L (ref 10–40)
AST SERPL-CCNC: 14 U/L (ref 10–40)
AST SERPL-CCNC: 17 U/L (ref 10–40)
AST SERPL-CCNC: 17 U/L (ref 10–40)
AST SERPL-CCNC: 18 U/L (ref 10–40)
AST SERPL-CCNC: 18 U/L (ref 10–40)
AST SERPL-CCNC: 19 U/L (ref 10–40)
AST SERPL-CCNC: 20 U/L (ref 10–40)
AST SERPL-CCNC: 20 U/L (ref 10–40)
AST SERPL-CCNC: 21 U/L (ref 10–40)
AST SERPL-CCNC: 2201 U/L (ref 10–40)
AST SERPL-CCNC: 23 U/L (ref 10–40)
AST SERPL-CCNC: 23 U/L (ref 10–40)
AST SERPL-CCNC: 25 U/L (ref 10–40)
AST SERPL-CCNC: 26 U/L (ref 10–40)
AST SERPL-CCNC: 27 U/L (ref 10–40)
AST SERPL-CCNC: 27 U/L (ref 10–40)
AST SERPL-CCNC: 28 U/L (ref 10–40)
AST SERPL-CCNC: 31 U/L (ref 10–40)
AST SERPL-CCNC: 31 U/L (ref 10–40)
AST SERPL-CCNC: 32 U/L (ref 10–40)
AST SERPL-CCNC: 38 U/L (ref 10–40)
AST SERPL-CCNC: 47 U/L (ref 10–40)
AV INDEX (PROSTH): 0.91
AV MEAN GRADIENT: 13 MMHG
AV PEAK GRADIENT: 24 MMHG
AV VALVE AREA: 3.6 CM2
AV VELOCITY RATIO: 0.82
BACTERIA #/AREA URNS AUTO: ABNORMAL /HPF
BACTERIA #/AREA URNS HPF: ABNORMAL /HPF
BACTERIA #/AREA URNS HPF: ABNORMAL /HPF
BACTERIA #/AREA URNS HPF: NORMAL /HPF
BACTERIA #/AREA URNS HPF: NORMAL /HPF
BACTERIA BLD CULT: ABNORMAL
BACTERIA BLD CULT: NORMAL
BACTERIA SPEC AEROBE CULT: NO GROWTH
BACTERIA SPEC ANAEROBE CULT: NORMAL
BACTERIA STL CULT: NORMAL
BACTERIA STL CULT: NORMAL
BACTERIA UR CULT: ABNORMAL
BACTERIA UR CULT: NO GROWTH
BACTERIA UR CULT: NO GROWTH
BACTERIA UR CULT: NORMAL
BASOPHILS # BLD AUTO: 0 K/UL (ref 0–0.2)
BASOPHILS # BLD AUTO: 0.01 K/UL (ref 0–0.2)
BASOPHILS # BLD AUTO: 0.02 K/UL (ref 0–0.2)
BASOPHILS # BLD AUTO: 0.03 K/UL (ref 0–0.2)
BASOPHILS # BLD AUTO: 0.04 K/UL (ref 0–0.2)
BASOPHILS # BLD AUTO: 0.05 K/UL (ref 0–0.2)
BASOPHILS # BLD AUTO: 0.06 K/UL (ref 0–0.2)
BASOPHILS # BLD AUTO: 0.06 K/UL (ref 0–0.2)
BASOPHILS # BLD AUTO: 0.09 K/UL (ref 0–0.2)
BASOPHILS # BLD AUTO: 0.09 K/UL (ref 0–0.2)
BASOPHILS # BLD AUTO: ABNORMAL K/UL (ref 0–0.2)
BASOPHILS NFR BLD: 0 % (ref 0–1.9)
BASOPHILS NFR BLD: 0.1 % (ref 0–1.9)
BASOPHILS NFR BLD: 0.1 % (ref 0–1.9)
BASOPHILS NFR BLD: 0.2 % (ref 0–1.9)
BASOPHILS NFR BLD: 0.3 % (ref 0–1.9)
BASOPHILS NFR BLD: 0.4 % (ref 0–1.9)
BASOPHILS NFR BLD: 0.5 % (ref 0–1.9)
BASOPHILS NFR BLD: 0.6 % (ref 0–1.9)
BASOPHILS NFR BLD: 0.6 % (ref 0–1.9)
BASOPHILS NFR BLD: 0.7 % (ref 0–1.9)
BASOPHILS NFR BLD: 0.8 % (ref 0–1.9)
BASOPHILS NFR BLD: 0.8 % (ref 0–1.9)
BASOPHILS NFR BLD: 0.9 % (ref 0–1.9)
BASOPHILS NFR BLD: 1 % (ref 0–1.9)
BASOPHILS NFR BLD: 1.2 % (ref 0–1.9)
BASOPHILS NFR BLD: 1.2 % (ref 0–1.9)
BILIRUB DIRECT SERPL-MCNC: 3 MG/DL (ref 0.1–0.3)
BILIRUB SERPL-MCNC: 0.2 MG/DL (ref 0.1–1)
BILIRUB SERPL-MCNC: 0.3 MG/DL (ref 0.1–1)
BILIRUB SERPL-MCNC: 0.4 MG/DL (ref 0.1–1)
BILIRUB SERPL-MCNC: 0.5 MG/DL (ref 0.1–1)
BILIRUB SERPL-MCNC: 0.6 MG/DL (ref 0.1–1)
BILIRUB SERPL-MCNC: 0.7 MG/DL (ref 0.1–1)
BILIRUB SERPL-MCNC: 0.7 MG/DL (ref 0.1–1)
BILIRUB SERPL-MCNC: 0.8 MG/DL (ref 0.1–1)
BILIRUB SERPL-MCNC: 0.8 MG/DL (ref 0.1–1)
BILIRUB SERPL-MCNC: 0.9 MG/DL (ref 0.1–1)
BILIRUB SERPL-MCNC: 1.2 MG/DL (ref 0.1–1)
BILIRUB SERPL-MCNC: 4 MG/DL (ref 0.1–1)
BILIRUB UR QL STRIP: NEGATIVE
BLD GP AB SCN CELLS X3 SERPL QL: NORMAL
BLD PROD TYP BPU: NORMAL
BLOOD GROUP ANTIBODIES SERPL: NORMAL
BLOOD GROUP ANTIBODIES SERPL: NORMAL
BLOOD UNIT EXPIRATION DATE: NORMAL
BLOOD UNIT TYPE CODE: 1700
BLOOD UNIT TYPE CODE: 1700
BLOOD UNIT TYPE CODE: 5100
BLOOD UNIT TYPE CODE: 6200
BLOOD UNIT TYPE CODE: 6200
BLOOD UNIT TYPE CODE: 7300
BLOOD UNIT TYPE CODE: 8400
BLOOD UNIT TYPE CODE: 8400
BLOOD UNIT TYPE CODE: 9500
BLOOD UNIT TYPE: NORMAL
BNP SERPL-MCNC: 138 PG/ML (ref 0–99)
BNP SERPL-MCNC: 316 PG/ML (ref 0–99)
BODY SITE - BONE MARROW: NORMAL
BORDETELLA PARAPERTUSSIS (IS1001): NOT DETECTED
BORDETELLA PARAPERTUSSIS (IS1001): NOT DETECTED
BORDETELLA PERTUSSIS (PTXP): NOT DETECTED
BORDETELLA PERTUSSIS (PTXP): NOT DETECTED
BSA FOR ECHO PROCEDURE: 1.87 M2
BUN SERPL-MCNC: 13 MG/DL (ref 8–23)
BUN SERPL-MCNC: 13 MG/DL (ref 8–23)
BUN SERPL-MCNC: 15 MG/DL (ref 8–23)
BUN SERPL-MCNC: 15 MG/DL (ref 8–23)
BUN SERPL-MCNC: 16 MG/DL (ref 8–23)
BUN SERPL-MCNC: 18 MG/DL (ref 8–23)
BUN SERPL-MCNC: 19 MG/DL (ref 8–23)
BUN SERPL-MCNC: 20 MG/DL (ref 8–23)
BUN SERPL-MCNC: 21 MG/DL (ref 8–23)
BUN SERPL-MCNC: 22 MG/DL (ref 8–23)
BUN SERPL-MCNC: 23 MG/DL (ref 8–23)
BUN SERPL-MCNC: 25 MG/DL (ref 8–23)
BUN SERPL-MCNC: 26 MG/DL (ref 8–23)
BUN SERPL-MCNC: 27 MG/DL (ref 8–23)
BUN SERPL-MCNC: 28 MG/DL (ref 8–23)
BUN SERPL-MCNC: 30 MG/DL (ref 8–23)
BUN SERPL-MCNC: 30 MG/DL (ref 8–23)
BUN SERPL-MCNC: 32 MG/DL (ref 8–23)
BUN SERPL-MCNC: 34 MG/DL (ref 8–23)
BUN SERPL-MCNC: 35 MG/DL (ref 8–23)
BUN SERPL-MCNC: 35 MG/DL (ref 8–23)
BUN SERPL-MCNC: 36 MG/DL (ref 8–23)
BUN SERPL-MCNC: 37 MG/DL (ref 8–23)
BUN SERPL-MCNC: 38 MG/DL (ref 8–23)
BUN SERPL-MCNC: 39 MG/DL (ref 8–23)
BUN SERPL-MCNC: 42 MG/DL (ref 8–23)
BUN SERPL-MCNC: 42 MG/DL (ref 8–23)
BUN SERPL-MCNC: 43 MG/DL (ref 8–23)
BUN SERPL-MCNC: 44 MG/DL (ref 8–23)
BUN SERPL-MCNC: 44 MG/DL (ref 8–23)
C DIFF GDH STL QL: POSITIVE
C DIFF GDH STL QL: POSITIVE
C DIFF TOX A+B STL QL IA: NEGATIVE
C DIFF TOX A+B STL QL IA: POSITIVE
C DIFF TOX GENS STL QL NAA+PROBE: NEGATIVE
CA-I BLDV-SCNC: 0.82 MMOL/L (ref 1.06–1.42)
CA-I BLDV-SCNC: 0.9 MMOL/L (ref 1.06–1.42)
CA-I BLDV-SCNC: 0.99 MMOL/L (ref 1.06–1.42)
CA-I BLDV-SCNC: 1.02 MMOL/L (ref 1.06–1.42)
CA-I BLDV-SCNC: 1.03 MMOL/L (ref 1.06–1.42)
CA-I BLDV-SCNC: 1.08 MMOL/L (ref 1.06–1.42)
CA-I BLDV-SCNC: 1.3 MMOL/L (ref 1.06–1.42)
CA-I BLDV-SCNC: 1.86 MMOL/L (ref 1.06–1.42)
CALCIUM SERPL-MCNC: 10 MG/DL (ref 8.7–10.5)
CALCIUM SERPL-MCNC: 18.7 MG/DL (ref 8.7–10.5)
CALCIUM SERPL-MCNC: 18.7 MG/DL (ref 8.7–10.5)
CALCIUM SERPL-MCNC: 6.7 MG/DL (ref 8.7–10.5)
CALCIUM SERPL-MCNC: 6.9 MG/DL (ref 8.7–10.5)
CALCIUM SERPL-MCNC: 6.9 MG/DL (ref 8.7–10.5)
CALCIUM SERPL-MCNC: 7.1 MG/DL (ref 8.7–10.5)
CALCIUM SERPL-MCNC: 7.2 MG/DL (ref 8.7–10.5)
CALCIUM SERPL-MCNC: 7.2 MG/DL (ref 8.7–10.5)
CALCIUM SERPL-MCNC: 7.3 MG/DL (ref 8.7–10.5)
CALCIUM SERPL-MCNC: 7.4 MG/DL (ref 8.7–10.5)
CALCIUM SERPL-MCNC: 7.4 MG/DL (ref 8.7–10.5)
CALCIUM SERPL-MCNC: 7.5 MG/DL (ref 8.7–10.5)
CALCIUM SERPL-MCNC: 7.6 MG/DL (ref 8.7–10.5)
CALCIUM SERPL-MCNC: 7.8 MG/DL (ref 8.7–10.5)
CALCIUM SERPL-MCNC: 7.9 MG/DL (ref 8.7–10.5)
CALCIUM SERPL-MCNC: 8 MG/DL (ref 8.7–10.5)
CALCIUM SERPL-MCNC: 8.1 MG/DL (ref 8.7–10.5)
CALCIUM SERPL-MCNC: 8.6 MG/DL (ref 8.7–10.5)
CALCIUM SERPL-MCNC: 8.6 MG/DL (ref 8.7–10.5)
CALCIUM SERPL-MCNC: 8.8 MG/DL (ref 8.7–10.5)
CALCIUM SERPL-MCNC: 9.1 MG/DL (ref 8.7–10.5)
CALCIUM SERPL-MCNC: 9.1 MG/DL (ref 8.7–10.5)
CALCIUM SERPL-MCNC: 9.2 MG/DL (ref 8.7–10.5)
CALCIUM SERPL-MCNC: 9.3 MG/DL (ref 8.7–10.5)
CALCIUM SERPL-MCNC: 9.3 MG/DL (ref 8.7–10.5)
CALCIUM SERPL-MCNC: 9.4 MG/DL (ref 8.7–10.5)
CALCIUM SERPL-MCNC: 9.4 MG/DL (ref 8.7–10.5)
CALCIUM SERPL-MCNC: 9.5 MG/DL (ref 8.7–10.5)
CALCIUM SERPL-MCNC: 9.5 MG/DL (ref 8.7–10.5)
CALCIUM SERPL-MCNC: 9.6 MG/DL (ref 8.7–10.5)
CALCIUM SERPL-MCNC: 9.6 MG/DL (ref 8.7–10.5)
CALCIUM SERPL-MCNC: 9.7 MG/DL (ref 8.7–10.5)
CALCIUM SERPL-MCNC: 9.7 MG/DL (ref 8.7–10.5)
CALCIUM SERPL-MCNC: 9.8 MG/DL (ref 8.7–10.5)
CALCIUM SERPL-MCNC: 9.9 MG/DL (ref 8.7–10.5)
CHLAMYDIA PNEUMONIAE: NOT DETECTED
CHLAMYDIA PNEUMONIAE: NOT DETECTED
CHLORIDE SERPL-SCNC: 101 MMOL/L (ref 95–110)
CHLORIDE SERPL-SCNC: 101 MMOL/L (ref 95–110)
CHLORIDE SERPL-SCNC: 103 MMOL/L (ref 95–110)
CHLORIDE SERPL-SCNC: 104 MMOL/L (ref 95–110)
CHLORIDE SERPL-SCNC: 105 MMOL/L (ref 95–110)
CHLORIDE SERPL-SCNC: 106 MMOL/L (ref 95–110)
CHLORIDE SERPL-SCNC: 107 MMOL/L (ref 95–110)
CHLORIDE SERPL-SCNC: 108 MMOL/L (ref 95–110)
CHLORIDE SERPL-SCNC: 109 MMOL/L (ref 95–110)
CHLORIDE SERPL-SCNC: 110 MMOL/L (ref 95–110)
CHLORIDE SERPL-SCNC: 111 MMOL/L (ref 95–110)
CHLORIDE SERPL-SCNC: 112 MMOL/L (ref 95–110)
CHLORIDE SERPL-SCNC: 113 MMOL/L (ref 95–110)
CHLORIDE SERPL-SCNC: 113 MMOL/L (ref 95–110)
CHLORIDE SERPL-SCNC: 114 MMOL/L (ref 95–110)
CHLORIDE SERPL-SCNC: 114 MMOL/L (ref 95–110)
CHLORIDE SERPL-SCNC: 115 MMOL/L (ref 95–110)
CHLORIDE SERPL-SCNC: 117 MMOL/L (ref 95–110)
CHLORIDE SERPL-SCNC: 117 MMOL/L (ref 95–110)
CHLORIDE SERPL-SCNC: 119 MMOL/L (ref 95–110)
CHLORIDE SERPL-SCNC: 120 MMOL/L (ref 95–110)
CHLORIDE SERPL-SCNC: 120 MMOL/L (ref 95–110)
CHLORIDE UR-SCNC: 26 MMOL/L (ref 25–200)
CHLORIDE UR-SCNC: 37 MMOL/L (ref 25–200)
CHOLEST SERPL-MCNC: 150 MG/DL (ref 120–199)
CHOLEST/HDLC SERPL: 3 {RATIO} (ref 2–5)
CHROM BANDING METHOD: NORMAL
CHROMOSOME ANALYSIS BM ADDITIONAL INFORMATION: NORMAL
CHROMOSOME ANALYSIS BM RELEASED BY: NORMAL
CHROMOSOME ANALYSIS BM RESULT SUMMARY: NORMAL
CLARITY UR REFRACT.AUTO: ABNORMAL
CLARITY UR: CLEAR
CLINICAL CYTOGENETICIST REVIEW: NORMAL
CLINICAL DIAGNOSIS - BONE MARROW: NORMAL
CO2 SERPL-SCNC: 10 MMOL/L (ref 23–29)
CO2 SERPL-SCNC: 11 MMOL/L (ref 23–29)
CO2 SERPL-SCNC: 12 MMOL/L (ref 23–29)
CO2 SERPL-SCNC: 13 MMOL/L (ref 23–29)
CO2 SERPL-SCNC: 13 MMOL/L (ref 23–29)
CO2 SERPL-SCNC: 14 MMOL/L (ref 23–29)
CO2 SERPL-SCNC: 15 MMOL/L (ref 23–29)
CO2 SERPL-SCNC: 15 MMOL/L (ref 23–29)
CO2 SERPL-SCNC: 16 MMOL/L (ref 23–29)
CO2 SERPL-SCNC: 16 MMOL/L (ref 23–29)
CO2 SERPL-SCNC: 17 MMOL/L (ref 23–29)
CO2 SERPL-SCNC: 17 MMOL/L (ref 23–29)
CO2 SERPL-SCNC: 18 MMOL/L (ref 23–29)
CO2 SERPL-SCNC: 19 MMOL/L (ref 23–29)
CO2 SERPL-SCNC: 20 MMOL/L (ref 23–29)
CO2 SERPL-SCNC: 20 MMOL/L (ref 23–29)
CO2 SERPL-SCNC: 21 MMOL/L (ref 23–29)
CO2 SERPL-SCNC: 22 MMOL/L (ref 23–29)
CO2 SERPL-SCNC: 23 MMOL/L (ref 23–29)
CO2 SERPL-SCNC: 24 MMOL/L (ref 23–29)
CO2 SERPL-SCNC: 25 MMOL/L (ref 23–29)
CO2 SERPL-SCNC: 28 MMOL/L (ref 23–29)
CO2 SERPL-SCNC: 9 MMOL/L (ref 23–29)
CO2 SERPL-SCNC: <5 MMOL/L (ref 23–29)
CO2 SERPL-SCNC: <5 MMOL/L (ref 23–29)
CODING SYSTEM: NORMAL
COLOR UR AUTO: YELLOW
COLOR UR: YELLOW
COMMENT: NORMAL
CORONAVIRUS 229E, COMMON COLD VIRUS: NOT DETECTED
CORONAVIRUS 229E, COMMON COLD VIRUS: NOT DETECTED
CORONAVIRUS HKU1, COMMON COLD VIRUS: NOT DETECTED
CORONAVIRUS HKU1, COMMON COLD VIRUS: NOT DETECTED
CORONAVIRUS NL63, COMMON COLD VIRUS: NOT DETECTED
CORONAVIRUS NL63, COMMON COLD VIRUS: NOT DETECTED
CORONAVIRUS OC43, COMMON COLD VIRUS: NOT DETECTED
CORONAVIRUS OC43, COMMON COLD VIRUS: NOT DETECTED
CREAT SERPL-MCNC: 1.2 MG/DL (ref 0.5–1.4)
CREAT SERPL-MCNC: 1.3 MG/DL (ref 0.5–1.4)
CREAT SERPL-MCNC: 1.3 MG/DL (ref 0.5–1.4)
CREAT SERPL-MCNC: 1.4 MG/DL (ref 0.5–1.4)
CREAT SERPL-MCNC: 1.4 MG/DL (ref 0.5–1.4)
CREAT SERPL-MCNC: 1.5 MG/DL (ref 0.5–1.4)
CREAT SERPL-MCNC: 1.6 MG/DL (ref 0.5–1.4)
CREAT SERPL-MCNC: 1.6 MG/DL (ref 0.5–1.4)
CREAT SERPL-MCNC: 1.7 MG/DL (ref 0.5–1.4)
CREAT SERPL-MCNC: 1.8 MG/DL (ref 0.5–1.4)
CREAT SERPL-MCNC: 1.9 MG/DL (ref 0.5–1.4)
CREAT SERPL-MCNC: 2 MG/DL (ref 0.5–1.4)
CREAT SERPL-MCNC: 2.1 MG/DL (ref 0.5–1.4)
CREAT SERPL-MCNC: 2.2 MG/DL (ref 0.5–1.4)
CREAT SERPL-MCNC: 2.2 MG/DL (ref 0.5–1.4)
CREAT SERPL-MCNC: 2.3 MG/DL (ref 0.5–1.4)
CREAT SERPL-MCNC: 2.4 MG/DL (ref 0.5–1.4)
CREAT SERPL-MCNC: 2.5 MG/DL (ref 0.5–1.4)
CREAT SERPL-MCNC: 2.6 MG/DL (ref 0.5–1.4)
CREAT SERPL-MCNC: 2.7 MG/DL (ref 0.5–1.4)
CREAT SERPL-MCNC: 2.8 MG/DL (ref 0.5–1.4)
CREAT SERPL-MCNC: 2.9 MG/DL (ref 0.5–1.4)
CREAT SERPL-MCNC: 2.9 MG/DL (ref 0.5–1.4)
CV ECHO LV RWT: 0.86 CM
DACRYOCYTES BLD QL SMEAR: ABNORMAL
DELSYS: ABNORMAL
DIFFERENTIAL METHOD: ABNORMAL
DISPENSE STATUS: NORMAL
DOP CALC AO PEAK VEL: 2.44 M/S
DOP CALC AO VTI: 43.13 CM
DOP CALC LVOT AREA: 3.9 CM2
DOP CALC LVOT DIAMETER: 2.24 CM
DOP CALC LVOT PEAK VEL: 2 M/S
DOP CALC LVOT STROKE VOLUME: 155.27 CM3
DOP CALCLVOT PEAK VEL VTI: 39.42 CM
E COLI SXT1 STL QL IA: NEGATIVE
E COLI SXT1 STL QL IA: NEGATIVE
E COLI SXT2 STL QL IA: NEGATIVE
E COLI SXT2 STL QL IA: NEGATIVE
E WAVE DECELERATION TIME: 242.49 MSEC
E/A RATIO: 1.37
E/E' RATIO: 11.83 M/S
EBV DNA BY PCR: 350 IU/ML
ECHO LV POSTERIOR WALL: 1.55 CM (ref 0.6–1.1)
EOSINOPHIL # BLD AUTO: 0 K/UL (ref 0–0.5)
EOSINOPHIL # BLD AUTO: 0.1 K/UL (ref 0–0.5)
EOSINOPHIL # BLD AUTO: 0.2 K/UL (ref 0–0.5)
EOSINOPHIL # BLD AUTO: ABNORMAL K/UL (ref 0–0.5)
EOSINOPHIL NFR BLD: 0 % (ref 0–8)
EOSINOPHIL NFR BLD: 0.1 % (ref 0–8)
EOSINOPHIL NFR BLD: 0.2 % (ref 0–8)
EOSINOPHIL NFR BLD: 0.3 % (ref 0–8)
EOSINOPHIL NFR BLD: 0.3 % (ref 0–8)
EOSINOPHIL NFR BLD: 0.4 % (ref 0–8)
EOSINOPHIL NFR BLD: 0.4 % (ref 0–8)
EOSINOPHIL NFR BLD: 0.6 % (ref 0–8)
EOSINOPHIL NFR BLD: 0.9 % (ref 0–8)
EOSINOPHIL NFR BLD: 1 % (ref 0–8)
EOSINOPHIL NFR BLD: 1.1 % (ref 0–8)
EOSINOPHIL NFR BLD: 1.2 % (ref 0–8)
EOSINOPHIL NFR BLD: 1.5 % (ref 0–8)
EOSINOPHIL NFR BLD: 1.5 % (ref 0–8)
EOSINOPHIL NFR BLD: 1.6 % (ref 0–8)
EOSINOPHIL NFR BLD: 1.8 % (ref 0–8)
EOSINOPHIL NFR BLD: 1.9 % (ref 0–8)
EOSINOPHIL NFR BLD: 2 % (ref 0–8)
EOSINOPHIL NFR BLD: 2.4 % (ref 0–8)
EOSINOPHIL NFR BLD: 2.4 % (ref 0–8)
EOSINOPHIL NFR BLD: 2.6 % (ref 0–8)
EOSINOPHIL NFR BLD: 2.7 % (ref 0–8)
EOSINOPHIL NFR BLD: 4 % (ref 0–8)
EOSINOPHIL NFR BLD: 4 % (ref 0–8)
EOSINOPHIL NFR BLD: 6 % (ref 0–8)
EOSINOPHIL NFR BLD: 9.1 % (ref 0–8)
ERYTHROCYTE [DISTWIDTH] IN BLOOD BY AUTOMATED COUNT: 13.4 % (ref 11.5–14.5)
ERYTHROCYTE [DISTWIDTH] IN BLOOD BY AUTOMATED COUNT: 13.5 % (ref 11.5–14.5)
ERYTHROCYTE [DISTWIDTH] IN BLOOD BY AUTOMATED COUNT: 13.6 % (ref 11.5–14.5)
ERYTHROCYTE [DISTWIDTH] IN BLOOD BY AUTOMATED COUNT: 13.8 % (ref 11.5–14.5)
ERYTHROCYTE [DISTWIDTH] IN BLOOD BY AUTOMATED COUNT: 13.8 % (ref 11.5–14.5)
ERYTHROCYTE [DISTWIDTH] IN BLOOD BY AUTOMATED COUNT: 14.4 % (ref 11.5–14.5)
ERYTHROCYTE [DISTWIDTH] IN BLOOD BY AUTOMATED COUNT: 14.7 % (ref 11.5–14.5)
ERYTHROCYTE [DISTWIDTH] IN BLOOD BY AUTOMATED COUNT: 14.8 % (ref 11.5–14.5)
ERYTHROCYTE [DISTWIDTH] IN BLOOD BY AUTOMATED COUNT: 15 % (ref 11.5–14.5)
ERYTHROCYTE [DISTWIDTH] IN BLOOD BY AUTOMATED COUNT: 15.1 % (ref 11.5–14.5)
ERYTHROCYTE [DISTWIDTH] IN BLOOD BY AUTOMATED COUNT: 15.3 % (ref 11.5–14.5)
ERYTHROCYTE [DISTWIDTH] IN BLOOD BY AUTOMATED COUNT: 15.4 % (ref 11.5–14.5)
ERYTHROCYTE [DISTWIDTH] IN BLOOD BY AUTOMATED COUNT: 15.5 % (ref 11.5–14.5)
ERYTHROCYTE [DISTWIDTH] IN BLOOD BY AUTOMATED COUNT: 15.6 % (ref 11.5–14.5)
ERYTHROCYTE [DISTWIDTH] IN BLOOD BY AUTOMATED COUNT: 15.6 % (ref 11.5–14.5)
ERYTHROCYTE [DISTWIDTH] IN BLOOD BY AUTOMATED COUNT: 15.7 % (ref 11.5–14.5)
ERYTHROCYTE [DISTWIDTH] IN BLOOD BY AUTOMATED COUNT: 15.7 % (ref 11.5–14.5)
ERYTHROCYTE [DISTWIDTH] IN BLOOD BY AUTOMATED COUNT: 15.8 % (ref 11.5–14.5)
ERYTHROCYTE [DISTWIDTH] IN BLOOD BY AUTOMATED COUNT: 15.9 % (ref 11.5–14.5)
ERYTHROCYTE [DISTWIDTH] IN BLOOD BY AUTOMATED COUNT: 16.1 % (ref 11.5–14.5)
ERYTHROCYTE [DISTWIDTH] IN BLOOD BY AUTOMATED COUNT: 16.2 % (ref 11.5–14.5)
ERYTHROCYTE [DISTWIDTH] IN BLOOD BY AUTOMATED COUNT: 16.3 % (ref 11.5–14.5)
ERYTHROCYTE [DISTWIDTH] IN BLOOD BY AUTOMATED COUNT: 16.4 % (ref 11.5–14.5)
ERYTHROCYTE [DISTWIDTH] IN BLOOD BY AUTOMATED COUNT: 16.4 % (ref 11.5–14.5)
ERYTHROCYTE [DISTWIDTH] IN BLOOD BY AUTOMATED COUNT: 16.5 % (ref 11.5–14.5)
ERYTHROCYTE [DISTWIDTH] IN BLOOD BY AUTOMATED COUNT: 16.7 % (ref 11.5–14.5)
ERYTHROCYTE [DISTWIDTH] IN BLOOD BY AUTOMATED COUNT: 16.8 % (ref 11.5–14.5)
ERYTHROCYTE [DISTWIDTH] IN BLOOD BY AUTOMATED COUNT: 17.1 % (ref 11.5–14.5)
ERYTHROCYTE [DISTWIDTH] IN BLOOD BY AUTOMATED COUNT: 17.2 % (ref 11.5–14.5)
ERYTHROCYTE [DISTWIDTH] IN BLOOD BY AUTOMATED COUNT: 17.3 % (ref 11.5–14.5)
ERYTHROCYTE [DISTWIDTH] IN BLOOD BY AUTOMATED COUNT: 17.7 % (ref 11.5–14.5)
ERYTHROCYTE [DISTWIDTH] IN BLOOD BY AUTOMATED COUNT: 18.6 % (ref 11.5–14.5)
ERYTHROCYTE [DISTWIDTH] IN BLOOD BY AUTOMATED COUNT: 18.8 % (ref 11.5–14.5)
ERYTHROCYTE [DISTWIDTH] IN BLOOD BY AUTOMATED COUNT: 20.5 % (ref 11.5–14.5)
ERYTHROCYTE [DISTWIDTH] IN BLOOD BY AUTOMATED COUNT: 20.5 % (ref 11.5–14.5)
ERYTHROCYTE [DISTWIDTH] IN BLOOD BY AUTOMATED COUNT: 20.9 % (ref 11.5–14.5)
ERYTHROCYTE [SEDIMENTATION RATE] IN BLOOD BY WESTERGREN METHOD: 16 MM/H
ERYTHROCYTE [SEDIMENTATION RATE] IN BLOOD BY WESTERGREN METHOD: 16 MM/H
ERYTHROCYTE [SEDIMENTATION RATE] IN BLOOD BY WESTERGREN METHOD: 20 MM/H
EST. GFR  (AFRICAN AMERICAN): 24 ML/MIN/1.73 M^2
EST. GFR  (AFRICAN AMERICAN): 24 ML/MIN/1.73 M^2
EST. GFR  (AFRICAN AMERICAN): 25 ML/MIN/1.73 M^2
EST. GFR  (AFRICAN AMERICAN): 27 ML/MIN/1.73 M^2
EST. GFR  (AFRICAN AMERICAN): 28 ML/MIN/1.73 M^2
EST. GFR  (AFRICAN AMERICAN): 29 ML/MIN/1.73 M^2
EST. GFR  (AFRICAN AMERICAN): 31 ML/MIN/1.73 M^2
EST. GFR  (AFRICAN AMERICAN): 32 ML/MIN/1.73 M^2
EST. GFR  (AFRICAN AMERICAN): 34 ML/MIN/1.73 M^2
EST. GFR  (AFRICAN AMERICAN): 34 ML/MIN/1.73 M^2
EST. GFR  (AFRICAN AMERICAN): 36 ML/MIN/1.73 M^2
EST. GFR  (AFRICAN AMERICAN): 38 ML/MIN/1.73 M^2
EST. GFR  (AFRICAN AMERICAN): 38.2 ML/MIN/1.73 M^2
EST. GFR  (AFRICAN AMERICAN): 38.2 ML/MIN/1.73 M^2
EST. GFR  (AFRICAN AMERICAN): 40.7 ML/MIN/1.73 M^2
EST. GFR  (AFRICAN AMERICAN): 40.7 ML/MIN/1.73 M^2
EST. GFR  (AFRICAN AMERICAN): 41 ML/MIN/1.73 M^2
EST. GFR  (AFRICAN AMERICAN): 41 ML/MIN/1.73 M^2
EST. GFR  (AFRICAN AMERICAN): 43 ML/MIN/1.73 M^2
EST. GFR  (AFRICAN AMERICAN): 43.4 ML/MIN/1.73 M^2
EST. GFR  (AFRICAN AMERICAN): 46.5 ML/MIN/1.73 M^2
EST. GFR  (AFRICAN AMERICAN): 47 ML/MIN/1.73 M^2
EST. GFR  (AFRICAN AMERICAN): 50 ML/MIN/1.73 M^2
EST. GFR  (AFRICAN AMERICAN): 50 ML/MIN/1.73 M^2
EST. GFR  (AFRICAN AMERICAN): 54 ML/MIN/1.73 M^2
EST. GFR  (AFRICAN AMERICAN): 54.1 ML/MIN/1.73 M^2
EST. GFR  (AFRICAN AMERICAN): 54.1 ML/MIN/1.73 M^2
EST. GFR  (AFRICAN AMERICAN): 54.5 ML/MIN/1.73 M^2
EST. GFR  (AFRICAN AMERICAN): 59 ML/MIN/1.73 M^2
EST. GFR  (AFRICAN AMERICAN): 59 ML/MIN/1.73 M^2
EST. GFR  (AFRICAN AMERICAN): >60 ML/MIN/1.73 M^2
EST. GFR  (NON AFRICAN AMERICAN): 21 ML/MIN/1.73 M^2
EST. GFR  (NON AFRICAN AMERICAN): 21 ML/MIN/1.73 M^2
EST. GFR  (NON AFRICAN AMERICAN): 22 ML/MIN/1.73 M^2
EST. GFR  (NON AFRICAN AMERICAN): 23 ML/MIN/1.73 M^2
EST. GFR  (NON AFRICAN AMERICAN): 24 ML/MIN/1.73 M^2
EST. GFR  (NON AFRICAN AMERICAN): 25 ML/MIN/1.73 M^2
EST. GFR  (NON AFRICAN AMERICAN): 27 ML/MIN/1.73 M^2
EST. GFR  (NON AFRICAN AMERICAN): 28 ML/MIN/1.73 M^2
EST. GFR  (NON AFRICAN AMERICAN): 29 ML/MIN/1.73 M^2
EST. GFR  (NON AFRICAN AMERICAN): 29 ML/MIN/1.73 M^2
EST. GFR  (NON AFRICAN AMERICAN): 31 ML/MIN/1.73 M^2
EST. GFR  (NON AFRICAN AMERICAN): 33 ML/MIN/1.73 M^2
EST. GFR  (NON AFRICAN AMERICAN): 33.1 ML/MIN/1.73 M^2
EST. GFR  (NON AFRICAN AMERICAN): 33.1 ML/MIN/1.73 M^2
EST. GFR  (NON AFRICAN AMERICAN): 35 ML/MIN/1.73 M^2
EST. GFR  (NON AFRICAN AMERICAN): 35 ML/MIN/1.73 M^2
EST. GFR  (NON AFRICAN AMERICAN): 35.2 ML/MIN/1.73 M^2
EST. GFR  (NON AFRICAN AMERICAN): 35.2 ML/MIN/1.73 M^2
EST. GFR  (NON AFRICAN AMERICAN): 37.6 ML/MIN/1.73 M^2
EST. GFR  (NON AFRICAN AMERICAN): 38 ML/MIN/1.73 M^2
EST. GFR  (NON AFRICAN AMERICAN): 40 ML/MIN/1.73 M^2
EST. GFR  (NON AFRICAN AMERICAN): 40.3 ML/MIN/1.73 M^2
EST. GFR  (NON AFRICAN AMERICAN): 43 ML/MIN/1.73 M^2
EST. GFR  (NON AFRICAN AMERICAN): 43 ML/MIN/1.73 M^2
EST. GFR  (NON AFRICAN AMERICAN): 46.8 ML/MIN/1.73 M^2
EST. GFR  (NON AFRICAN AMERICAN): 46.8 ML/MIN/1.73 M^2
EST. GFR  (NON AFRICAN AMERICAN): 47 ML/MIN/1.73 M^2
EST. GFR  (NON AFRICAN AMERICAN): 47.2 ML/MIN/1.73 M^2
EST. GFR  (NON AFRICAN AMERICAN): 51 ML/MIN/1.73 M^2
EST. GFR  (NON AFRICAN AMERICAN): 51 ML/MIN/1.73 M^2
EST. GFR  (NON AFRICAN AMERICAN): 56.1 ML/MIN/1.73 M^2
EST. GFR  (NON AFRICAN AMERICAN): 56.1 ML/MIN/1.73 M^2
EST. GFR  (NON AFRICAN AMERICAN): >60 ML/MIN/1.73 M^2
ESTIMATED AVG GLUCOSE: 105 MG/DL (ref 68–131)
ETCO2: 15
ETCO2: 22
FERRITIN SERPL-MCNC: 1646 NG/ML (ref 20–300)
FERRITIN SERPL-MCNC: 3704 NG/ML (ref 20–300)
FERRITIN SERPL-MCNC: 4173 NG/ML (ref 20–300)
FERRITIN SERPL-MCNC: ABNORMAL NG/ML (ref 20–300)
FIBRINOGEN PPP-MCNC: 376 MG/DL (ref 182–366)
FINAL PATHOLOGIC DIAGNOSIS: NORMAL
FIO2: 60
FLOW CYTOMETRY ANTIBODIES ANALYZED - BONE MARROW: NORMAL
FLOW CYTOMETRY ANTIBODIES ANALYZED - LYMPH NODE: NORMAL
FLOW CYTOMETRY ANTIBODIES ANALYZED - LYMPH NODE: NORMAL
FLOW CYTOMETRY COMMENT - BONE MARROW: NORMAL
FLOW CYTOMETRY COMMENT - LYMPH NODE: NORMAL
FLOW CYTOMETRY COMMENT - LYMPH NODE: NORMAL
FLOW CYTOMETRY INTERPRETATION - BONE MARROW: NORMAL
FLOW CYTOMETRY INTERPRETATION - LYMPH NODE: NORMAL
FLOW CYTOMETRY INTERPRETATION - LYMPH NODE: NORMAL
FLOW: 2
FLUBV RNA NPH QL NAA+NON-PROBE: NOT DETECTED
FLUBV RNA NPH QL NAA+NON-PROBE: NOT DETECTED
FOLATE SERPL-MCNC: 16.4 NG/ML (ref 4–24)
FRACTIONAL SHORTENING: 53 % (ref 28–44)
FUNGUS SPEC CULT: NORMAL
GLUCOSE SERPL-MCNC: 100 MG/DL (ref 70–110)
GLUCOSE SERPL-MCNC: 100 MG/DL (ref 70–110)
GLUCOSE SERPL-MCNC: 101 MG/DL (ref 70–110)
GLUCOSE SERPL-MCNC: 103 MG/DL (ref 70–110)
GLUCOSE SERPL-MCNC: 103 MG/DL (ref 70–110)
GLUCOSE SERPL-MCNC: 106 MG/DL (ref 70–110)
GLUCOSE SERPL-MCNC: 108 MG/DL (ref 70–110)
GLUCOSE SERPL-MCNC: 108 MG/DL (ref 70–110)
GLUCOSE SERPL-MCNC: 109 MG/DL (ref 70–110)
GLUCOSE SERPL-MCNC: 109 MG/DL (ref 70–110)
GLUCOSE SERPL-MCNC: 110 MG/DL (ref 70–110)
GLUCOSE SERPL-MCNC: 110 MG/DL (ref 70–110)
GLUCOSE SERPL-MCNC: 112 MG/DL (ref 70–110)
GLUCOSE SERPL-MCNC: 116 MG/DL (ref 70–110)
GLUCOSE SERPL-MCNC: 117 MG/DL (ref 70–110)
GLUCOSE SERPL-MCNC: 118 MG/DL (ref 70–110)
GLUCOSE SERPL-MCNC: 119 MG/DL (ref 70–110)
GLUCOSE SERPL-MCNC: 120 MG/DL (ref 70–110)
GLUCOSE SERPL-MCNC: 120 MG/DL (ref 70–110)
GLUCOSE SERPL-MCNC: 121 MG/DL (ref 70–110)
GLUCOSE SERPL-MCNC: 131 MG/DL (ref 70–110)
GLUCOSE SERPL-MCNC: 136 MG/DL (ref 70–110)
GLUCOSE SERPL-MCNC: 137 MG/DL (ref 70–110)
GLUCOSE SERPL-MCNC: 148 MG/DL (ref 70–110)
GLUCOSE SERPL-MCNC: 161 MG/DL (ref 70–110)
GLUCOSE SERPL-MCNC: 207 MG/DL (ref 70–110)
GLUCOSE SERPL-MCNC: 371 MG/DL (ref 70–110)
GLUCOSE SERPL-MCNC: 52 MG/DL (ref 70–110)
GLUCOSE SERPL-MCNC: 568 MG/DL (ref 70–110)
GLUCOSE SERPL-MCNC: 568 MG/DL (ref 70–110)
GLUCOSE SERPL-MCNC: 58 MG/DL (ref 70–110)
GLUCOSE SERPL-MCNC: 72 MG/DL (ref 70–110)
GLUCOSE SERPL-MCNC: 74 MG/DL (ref 70–110)
GLUCOSE SERPL-MCNC: 79 MG/DL (ref 70–110)
GLUCOSE SERPL-MCNC: 80 MG/DL (ref 70–110)
GLUCOSE SERPL-MCNC: 80 MG/DL (ref 70–110)
GLUCOSE SERPL-MCNC: 81 MG/DL (ref 70–110)
GLUCOSE SERPL-MCNC: 81 MG/DL (ref 70–110)
GLUCOSE SERPL-MCNC: 82 MG/DL (ref 70–110)
GLUCOSE SERPL-MCNC: 82 MG/DL (ref 70–110)
GLUCOSE SERPL-MCNC: 83 MG/DL (ref 70–110)
GLUCOSE SERPL-MCNC: 84 MG/DL (ref 70–110)
GLUCOSE SERPL-MCNC: 85 MG/DL (ref 70–110)
GLUCOSE SERPL-MCNC: 86 MG/DL (ref 70–110)
GLUCOSE SERPL-MCNC: 86 MG/DL (ref 70–110)
GLUCOSE SERPL-MCNC: 87 MG/DL (ref 70–110)
GLUCOSE SERPL-MCNC: 87 MG/DL (ref 70–110)
GLUCOSE SERPL-MCNC: 91 MG/DL (ref 70–110)
GLUCOSE SERPL-MCNC: 92 MG/DL (ref 70–110)
GLUCOSE SERPL-MCNC: 92 MG/DL (ref 70–110)
GLUCOSE SERPL-MCNC: 93 MG/DL (ref 70–110)
GLUCOSE SERPL-MCNC: 94 MG/DL (ref 70–110)
GLUCOSE SERPL-MCNC: 95 MG/DL (ref 70–110)
GLUCOSE SERPL-MCNC: 96 MG/DL (ref 70–110)
GLUCOSE SERPL-MCNC: 98 MG/DL (ref 70–110)
GLUCOSE SERPL-MCNC: 98 MG/DL (ref 70–110)
GLUCOSE UR QL STRIP: NEGATIVE
GRAM STN SPEC: NORMAL
GRAM STN SPEC: NORMAL
GRAN CASTS #/AREA URNS LPF: 3 /LPF
GROSS: NORMAL
GROUP A STREP, MOLECULAR: NEGATIVE
HAPTOGLOB SERPL-MCNC: 277 MG/DL (ref 30–250)
HBA1C MFR BLD HPLC: 5.3 % (ref 4–5.6)
HBV CORE AB SERPL QL IA: NEGATIVE
HBV CORE AB SERPL QL IA: NEGATIVE
HBV SURFACE AB SER-ACNC: POSITIVE M[IU]/ML
HBV SURFACE AG SERPL QL IA: NEGATIVE
HBV SURFACE AG SERPL QL IA: NEGATIVE
HCO3 UR-SCNC: 10.4 MMOL/L (ref 24–28)
HCO3 UR-SCNC: 14.5 MMOL/L (ref 24–28)
HCO3 UR-SCNC: 19.9 MMOL/L (ref 24–28)
HCO3 UR-SCNC: 22.9 MMOL/L (ref 24–28)
HCO3 UR-SCNC: 8.1 MMOL/L (ref 24–28)
HCT VFR BLD AUTO: 18 % (ref 40–54)
HCT VFR BLD AUTO: 19.2 % (ref 40–54)
HCT VFR BLD AUTO: 19.4 % (ref 40–54)
HCT VFR BLD AUTO: 19.9 % (ref 40–54)
HCT VFR BLD AUTO: 21.6 % (ref 40–54)
HCT VFR BLD AUTO: 22.5 % (ref 40–54)
HCT VFR BLD AUTO: 22.8 % (ref 40–54)
HCT VFR BLD AUTO: 22.8 % (ref 40–54)
HCT VFR BLD AUTO: 24.5 % (ref 40–54)
HCT VFR BLD AUTO: 24.7 % (ref 40–54)
HCT VFR BLD AUTO: 24.7 % (ref 40–54)
HCT VFR BLD AUTO: 25.7 % (ref 40–54)
HCT VFR BLD AUTO: 26.3 % (ref 40–54)
HCT VFR BLD AUTO: 26.4 % (ref 40–54)
HCT VFR BLD AUTO: 26.7 % (ref 40–54)
HCT VFR BLD AUTO: 27 % (ref 40–54)
HCT VFR BLD AUTO: 27.4 % (ref 40–54)
HCT VFR BLD AUTO: 27.7 % (ref 40–54)
HCT VFR BLD AUTO: 28.1 % (ref 40–54)
HCT VFR BLD AUTO: 28.1 % (ref 40–54)
HCT VFR BLD AUTO: 28.5 % (ref 40–54)
HCT VFR BLD AUTO: 28.8 % (ref 40–54)
HCT VFR BLD AUTO: 29 % (ref 40–54)
HCT VFR BLD AUTO: 29.6 % (ref 40–54)
HCT VFR BLD AUTO: 29.7 % (ref 40–54)
HCT VFR BLD AUTO: 30 % (ref 40–54)
HCT VFR BLD AUTO: 30.2 % (ref 40–54)
HCT VFR BLD AUTO: 30.4 % (ref 40–54)
HCT VFR BLD AUTO: 30.5 % (ref 40–54)
HCT VFR BLD AUTO: 31.2 % (ref 40–54)
HCT VFR BLD AUTO: 31.3 % (ref 40–54)
HCT VFR BLD AUTO: 31.6 % (ref 40–54)
HCT VFR BLD AUTO: 32.1 % (ref 40–54)
HCT VFR BLD AUTO: 32.2 % (ref 40–54)
HCT VFR BLD AUTO: 32.4 % (ref 40–54)
HCT VFR BLD AUTO: 32.5 % (ref 40–54)
HCT VFR BLD AUTO: 33 % (ref 40–54)
HCT VFR BLD AUTO: 33.8 % (ref 40–54)
HCT VFR BLD AUTO: 34.3 % (ref 40–54)
HCT VFR BLD AUTO: 35 % (ref 40–54)
HCT VFR BLD AUTO: 35.1 % (ref 40–54)
HCT VFR BLD AUTO: 35.1 % (ref 40–54)
HCT VFR BLD AUTO: 36.4 % (ref 40–54)
HCT VFR BLD AUTO: 36.5 % (ref 40–54)
HCT VFR BLD AUTO: 36.8 % (ref 40–54)
HCT VFR BLD AUTO: 37.7 % (ref 40–54)
HCT VFR BLD AUTO: 37.8 % (ref 40–54)
HCT VFR BLD AUTO: 37.9 % (ref 40–54)
HCT VFR BLD AUTO: 38 % (ref 40–54)
HCT VFR BLD AUTO: 38.5 % (ref 40–54)
HCT VFR BLD AUTO: 39.6 % (ref 40–54)
HCT VFR BLD AUTO: 39.8 % (ref 40–54)
HCT VFR BLD AUTO: 42.5 % (ref 40–54)
HCT VFR BLD AUTO: 43 % (ref 40–54)
HCT VFR BLD CALC: 36 %PCV (ref 36–54)
HDLC SERPL-MCNC: 50 MG/DL (ref 40–75)
HDLC SERPL: 33.3 % (ref 20–50)
HGB BLD-MCNC: 10 G/DL (ref 14–18)
HGB BLD-MCNC: 10.1 G/DL (ref 14–18)
HGB BLD-MCNC: 10.2 G/DL (ref 14–18)
HGB BLD-MCNC: 10.3 G/DL (ref 14–18)
HGB BLD-MCNC: 10.3 G/DL (ref 14–18)
HGB BLD-MCNC: 10.5 G/DL (ref 14–18)
HGB BLD-MCNC: 10.6 G/DL (ref 14–18)
HGB BLD-MCNC: 10.6 G/DL (ref 14–18)
HGB BLD-MCNC: 10.7 G/DL (ref 14–18)
HGB BLD-MCNC: 10.8 G/DL (ref 14–18)
HGB BLD-MCNC: 10.9 G/DL (ref 14–18)
HGB BLD-MCNC: 11.2 G/DL (ref 14–18)
HGB BLD-MCNC: 11.3 G/DL (ref 14–18)
HGB BLD-MCNC: 11.4 G/DL (ref 14–18)
HGB BLD-MCNC: 11.5 G/DL (ref 14–18)
HGB BLD-MCNC: 11.7 G/DL (ref 14–18)
HGB BLD-MCNC: 11.7 G/DL (ref 14–18)
HGB BLD-MCNC: 11.9 G/DL (ref 14–18)
HGB BLD-MCNC: 12 G/DL (ref 14–18)
HGB BLD-MCNC: 12.3 G/DL (ref 14–18)
HGB BLD-MCNC: 12.3 G/DL (ref 14–18)
HGB BLD-MCNC: 13 G/DL (ref 14–18)
HGB BLD-MCNC: 13.6 G/DL (ref 14–18)
HGB BLD-MCNC: 6 G/DL (ref 14–18)
HGB BLD-MCNC: 6 G/DL (ref 14–18)
HGB BLD-MCNC: 6.3 G/DL (ref 14–18)
HGB BLD-MCNC: 6.7 G/DL (ref 14–18)
HGB BLD-MCNC: 6.7 G/DL (ref 14–18)
HGB BLD-MCNC: 7.2 G/DL (ref 14–18)
HGB BLD-MCNC: 7.2 G/DL (ref 14–18)
HGB BLD-MCNC: 7.3 G/DL (ref 14–18)
HGB BLD-MCNC: 7.3 G/DL (ref 14–18)
HGB BLD-MCNC: 7.6 G/DL (ref 14–18)
HGB BLD-MCNC: 7.6 G/DL (ref 14–18)
HGB BLD-MCNC: 7.9 G/DL (ref 14–18)
HGB BLD-MCNC: 7.9 G/DL (ref 14–18)
HGB BLD-MCNC: 8.2 G/DL (ref 14–18)
HGB BLD-MCNC: 8.8 G/DL (ref 14–18)
HGB BLD-MCNC: 8.9 G/DL (ref 14–18)
HGB BLD-MCNC: 8.9 G/DL (ref 14–18)
HGB BLD-MCNC: 9 G/DL (ref 14–18)
HGB BLD-MCNC: 9.2 G/DL (ref 14–18)
HGB BLD-MCNC: 9.3 G/DL (ref 14–18)
HGB BLD-MCNC: 9.4 G/DL (ref 14–18)
HGB BLD-MCNC: 9.4 G/DL (ref 14–18)
HGB BLD-MCNC: 9.5 G/DL (ref 14–18)
HGB BLD-MCNC: 9.6 G/DL (ref 14–18)
HGB BLD-MCNC: 9.7 G/DL (ref 14–18)
HGB BLD-MCNC: 9.9 G/DL (ref 14–18)
HGB BLD-MCNC: 9.9 G/DL (ref 14–18)
HGB UR QL STRIP: ABNORMAL
HGB UR QL STRIP: NEGATIVE
HIV 1+2 AB+HIV1 P24 AG SERPL QL IA: NEGATIVE
HPIV1 RNA NPH QL NAA+NON-PROBE: NOT DETECTED
HPIV1 RNA NPH QL NAA+NON-PROBE: NOT DETECTED
HPIV2 RNA NPH QL NAA+NON-PROBE: NOT DETECTED
HPIV2 RNA NPH QL NAA+NON-PROBE: NOT DETECTED
HPIV3 RNA NPH QL NAA+NON-PROBE: NOT DETECTED
HPIV3 RNA NPH QL NAA+NON-PROBE: NOT DETECTED
HPIV4 RNA NPH QL NAA+NON-PROBE: NOT DETECTED
HPIV4 RNA NPH QL NAA+NON-PROBE: NOT DETECTED
HUMAN METAPNEUMOVIRUS: NOT DETECTED
HUMAN METAPNEUMOVIRUS: NOT DETECTED
HYALINE CASTS #/AREA URNS LPF: 0 /LPF
HYALINE CASTS UR QL AUTO: 0 /LPF
HYPOCHROMIA BLD QL SMEAR: ABNORMAL
IMM GRANULOCYTES # BLD AUTO: 0 K/UL (ref 0–0.04)
IMM GRANULOCYTES # BLD AUTO: 0.02 K/UL (ref 0–0.04)
IMM GRANULOCYTES # BLD AUTO: 0.02 K/UL (ref 0–0.04)
IMM GRANULOCYTES # BLD AUTO: 0.03 K/UL (ref 0–0.04)
IMM GRANULOCYTES # BLD AUTO: 0.04 K/UL (ref 0–0.04)
IMM GRANULOCYTES # BLD AUTO: 0.06 K/UL (ref 0–0.04)
IMM GRANULOCYTES # BLD AUTO: 0.07 K/UL (ref 0–0.04)
IMM GRANULOCYTES # BLD AUTO: 0.07 K/UL (ref 0–0.04)
IMM GRANULOCYTES # BLD AUTO: 0.08 K/UL (ref 0–0.04)
IMM GRANULOCYTES # BLD AUTO: 0.09 K/UL (ref 0–0.04)
IMM GRANULOCYTES # BLD AUTO: 0.09 K/UL (ref 0–0.04)
IMM GRANULOCYTES # BLD AUTO: 0.11 K/UL (ref 0–0.04)
IMM GRANULOCYTES # BLD AUTO: 0.13 K/UL (ref 0–0.04)
IMM GRANULOCYTES # BLD AUTO: 0.14 K/UL (ref 0–0.04)
IMM GRANULOCYTES # BLD AUTO: 0.15 K/UL (ref 0–0.04)
IMM GRANULOCYTES # BLD AUTO: 0.25 K/UL (ref 0–0.04)
IMM GRANULOCYTES # BLD AUTO: 0.44 K/UL (ref 0–0.04)
IMM GRANULOCYTES # BLD AUTO: 0.73 K/UL (ref 0–0.04)
IMM GRANULOCYTES # BLD AUTO: 0.87 K/UL (ref 0–0.04)
IMM GRANULOCYTES # BLD AUTO: ABNORMAL K/UL
IMM GRANULOCYTES # BLD AUTO: ABNORMAL K/UL (ref 0–0.04)
IMM GRANULOCYTES NFR BLD AUTO: 0 % (ref 0–0.5)
IMM GRANULOCYTES NFR BLD AUTO: 0.3 % (ref 0–0.5)
IMM GRANULOCYTES NFR BLD AUTO: 0.4 % (ref 0–0.5)
IMM GRANULOCYTES NFR BLD AUTO: 0.5 % (ref 0–0.5)
IMM GRANULOCYTES NFR BLD AUTO: 0.5 % (ref 0–0.5)
IMM GRANULOCYTES NFR BLD AUTO: 0.6 % (ref 0–0.5)
IMM GRANULOCYTES NFR BLD AUTO: 0.8 % (ref 0–0.5)
IMM GRANULOCYTES NFR BLD AUTO: 0.9 % (ref 0–0.5)
IMM GRANULOCYTES NFR BLD AUTO: 0.9 % (ref 0–0.5)
IMM GRANULOCYTES NFR BLD AUTO: 1 % (ref 0–0.5)
IMM GRANULOCYTES NFR BLD AUTO: 1.2 % (ref 0–0.5)
IMM GRANULOCYTES NFR BLD AUTO: 1.3 % (ref 0–0.5)
IMM GRANULOCYTES NFR BLD AUTO: 1.4 % (ref 0–0.5)
IMM GRANULOCYTES NFR BLD AUTO: 1.6 % (ref 0–0.5)
IMM GRANULOCYTES NFR BLD AUTO: 1.6 % (ref 0–0.5)
IMM GRANULOCYTES NFR BLD AUTO: 1.9 % (ref 0–0.5)
IMM GRANULOCYTES NFR BLD AUTO: 2.7 % (ref 0–0.5)
IMM GRANULOCYTES NFR BLD AUTO: 4.6 % (ref 0–0.5)
IMM GRANULOCYTES NFR BLD AUTO: 6.1 % (ref 0–0.5)
IMM GRANULOCYTES NFR BLD AUTO: 7.4 % (ref 0–0.5)
IMM GRANULOCYTES NFR BLD AUTO: 7.8 % (ref 0–0.5)
IMM GRANULOCYTES NFR BLD AUTO: ABNORMAL %
IMM GRANULOCYTES NFR BLD AUTO: ABNORMAL % (ref 0–0.5)
INFLUENZA A (SUBTYPES H1,H1-2009,H3): NOT DETECTED
INFLUENZA A (SUBTYPES H1,H1-2009,H3): NOT DETECTED
INFLUENZA A, MOLECULAR: NEGATIVE
INFLUENZA A, MOLECULAR: NEGATIVE
INFLUENZA B, MOLECULAR: NEGATIVE
INFLUENZA B, MOLECULAR: NEGATIVE
INR PPP: 0.9 (ref 0.8–1.2)
INR PPP: 1 (ref 0.8–1.2)
INR PPP: 1.1 (ref 0.8–1.2)
INR PPP: 1.2 (ref 0.8–1.2)
INTERVENTRICULAR SEPTUM: 1.34 CM (ref 0.6–1.1)
IRON SERPL-MCNC: 24 UG/DL (ref 45–160)
IRON SERPL-MCNC: 32 UG/DL (ref 45–160)
IRON SERPL-MCNC: 39 UG/DL (ref 45–160)
IRON SERPL-MCNC: <10 UG/DL (ref 45–160)
IVRT: 69.2 MSEC
KARYOTYP MAR: NORMAL
KETONES UR QL STRIP: NEGATIVE
LA MAJOR: 4.73 CM
LA MINOR: 5.12 CM
LA WIDTH: 3.92 CM
LACTATE SERPL-SCNC: 0.9 MMOL/L (ref 0.5–2.2)
LACTATE SERPL-SCNC: 1.1 MMOL/L (ref 0.5–2.2)
LACTATE SERPL-SCNC: 1.1 MMOL/L (ref 0.5–2.2)
LACTATE SERPL-SCNC: 1.3 MMOL/L (ref 0.5–2.2)
LACTATE SERPL-SCNC: 1.3 MMOL/L (ref 0.5–2.2)
LACTATE SERPL-SCNC: 1.5 MMOL/L (ref 0.5–2.2)
LACTATE SERPL-SCNC: 1.9 MMOL/L (ref 0.5–2.2)
LACTATE SERPL-SCNC: 2 MMOL/L (ref 0.5–2.2)
LACTATE SERPL-SCNC: >12 MMOL/L (ref 0.5–2.2)
LDH SERPL L TO P-CCNC: 128 U/L (ref 110–260)
LDH SERPL L TO P-CCNC: 160 U/L (ref 110–260)
LDH SERPL L TO P-CCNC: 162 U/L (ref 110–260)
LDH SERPL L TO P-CCNC: 177 U/L (ref 110–260)
LDH SERPL L TO P-CCNC: 196 U/L (ref 110–260)
LDH SERPL L TO P-CCNC: 210 U/L (ref 110–260)
LDH SERPL L TO P-CCNC: 222 U/L (ref 110–260)
LDLC SERPL CALC-MCNC: 80.2 MG/DL (ref 63–159)
LEFT ATRIUM SIZE: 2.75 CM
LEFT ATRIUM VOLUME INDEX: 23.9 ML/M2
LEFT ATRIUM VOLUME: 45.06 CM3
LEFT INTERNAL DIMENSION IN SYSTOLE: 1.71 CM (ref 2.1–4)
LEFT VENTRICLE DIASTOLIC VOLUME INDEX: 28.9 ML/M2
LEFT VENTRICLE DIASTOLIC VOLUME: 54.55 ML
LEFT VENTRICLE MASS INDEX: 100 G/M2
LEFT VENTRICLE SYSTOLIC VOLUME INDEX: 4.5 ML/M2
LEFT VENTRICLE SYSTOLIC VOLUME: 8.58 ML
LEFT VENTRICULAR INTERNAL DIMENSION IN DIASTOLE: 3.6 CM (ref 3.5–6)
LEFT VENTRICULAR MASS: 189.22 G
LEUKOCYTE ESTERASE UR QL STRIP: ABNORMAL
LIPASE SERPL-CCNC: 81 U/L (ref 4–60)
LV LATERAL E/E' RATIO: 11.83 M/S
LV SEPTAL E/E' RATIO: 11.83 M/S
LYMPHOCYTES # BLD AUTO: 0 K/UL (ref 1–4.8)
LYMPHOCYTES # BLD AUTO: 0.3 K/UL (ref 1–4.8)
LYMPHOCYTES # BLD AUTO: 0.3 K/UL (ref 1–4.8)
LYMPHOCYTES # BLD AUTO: 0.4 K/UL (ref 1–4.8)
LYMPHOCYTES # BLD AUTO: 0.4 K/UL (ref 1–4.8)
LYMPHOCYTES # BLD AUTO: 0.5 K/UL (ref 1–4.8)
LYMPHOCYTES # BLD AUTO: 0.6 K/UL (ref 1–4.8)
LYMPHOCYTES # BLD AUTO: 0.7 K/UL (ref 1–4.8)
LYMPHOCYTES # BLD AUTO: 0.8 K/UL (ref 1–4.8)
LYMPHOCYTES # BLD AUTO: 0.9 K/UL (ref 1–4.8)
LYMPHOCYTES # BLD AUTO: 1 K/UL (ref 1–4.8)
LYMPHOCYTES # BLD AUTO: 1.1 K/UL (ref 1–4.8)
LYMPHOCYTES # BLD AUTO: ABNORMAL K/UL (ref 1–4.8)
LYMPHOCYTES NFR BLD: 0 % (ref 18–48)
LYMPHOCYTES NFR BLD: 1 % (ref 18–48)
LYMPHOCYTES NFR BLD: 10 % (ref 18–48)
LYMPHOCYTES NFR BLD: 10.1 % (ref 18–48)
LYMPHOCYTES NFR BLD: 10.2 % (ref 18–48)
LYMPHOCYTES NFR BLD: 10.2 % (ref 18–48)
LYMPHOCYTES NFR BLD: 10.8 % (ref 18–48)
LYMPHOCYTES NFR BLD: 10.8 % (ref 18–48)
LYMPHOCYTES NFR BLD: 11 % (ref 18–48)
LYMPHOCYTES NFR BLD: 12 % (ref 18–48)
LYMPHOCYTES NFR BLD: 12.5 % (ref 18–48)
LYMPHOCYTES NFR BLD: 12.9 % (ref 18–48)
LYMPHOCYTES NFR BLD: 14.5 % (ref 18–48)
LYMPHOCYTES NFR BLD: 15.2 % (ref 18–48)
LYMPHOCYTES NFR BLD: 16 % (ref 18–48)
LYMPHOCYTES NFR BLD: 16.9 % (ref 18–48)
LYMPHOCYTES NFR BLD: 17.9 % (ref 18–48)
LYMPHOCYTES NFR BLD: 19 % (ref 18–48)
LYMPHOCYTES NFR BLD: 2.5 % (ref 18–48)
LYMPHOCYTES NFR BLD: 20.2 % (ref 18–48)
LYMPHOCYTES NFR BLD: 21 % (ref 18–48)
LYMPHOCYTES NFR BLD: 26 % (ref 18–48)
LYMPHOCYTES NFR BLD: 26 % (ref 18–48)
LYMPHOCYTES NFR BLD: 27 % (ref 18–48)
LYMPHOCYTES NFR BLD: 28 % (ref 18–48)
LYMPHOCYTES NFR BLD: 3 % (ref 18–48)
LYMPHOCYTES NFR BLD: 3.7 % (ref 18–48)
LYMPHOCYTES NFR BLD: 32 % (ref 18–48)
LYMPHOCYTES NFR BLD: 36.4 % (ref 18–48)
LYMPHOCYTES NFR BLD: 4 % (ref 18–48)
LYMPHOCYTES NFR BLD: 4.5 % (ref 18–48)
LYMPHOCYTES NFR BLD: 5.2 % (ref 18–48)
LYMPHOCYTES NFR BLD: 56 % (ref 18–48)
LYMPHOCYTES NFR BLD: 6 % (ref 18–48)
LYMPHOCYTES NFR BLD: 6 % (ref 18–48)
LYMPHOCYTES NFR BLD: 6.5 % (ref 18–48)
LYMPHOCYTES NFR BLD: 6.6 % (ref 18–48)
LYMPHOCYTES NFR BLD: 7 % (ref 18–48)
LYMPHOCYTES NFR BLD: 8.5 % (ref 18–48)
LYMPHOCYTES NFR BLD: 8.9 % (ref 18–48)
LYMPHOCYTES NFR BLD: 9.2 % (ref 18–48)
LYMPHOCYTES NFR BLD: 9.4 % (ref 18–48)
LYMPHOCYTES NFR BLD: 9.7 % (ref 18–48)
LYMPHOCYTES NFR BLD: 9.9 % (ref 18–48)
Lab: NORMAL
MAGNESIUM SERPL-MCNC: 1.5 MG/DL (ref 1.6–2.6)
MAGNESIUM SERPL-MCNC: 1.6 MG/DL (ref 1.6–2.6)
MAGNESIUM SERPL-MCNC: 1.7 MG/DL (ref 1.6–2.6)
MAGNESIUM SERPL-MCNC: 1.7 MG/DL (ref 1.6–2.6)
MAGNESIUM SERPL-MCNC: 1.8 MG/DL (ref 1.6–2.6)
MAGNESIUM SERPL-MCNC: 1.9 MG/DL (ref 1.6–2.6)
MAGNESIUM SERPL-MCNC: 2 MG/DL (ref 1.6–2.6)
MAGNESIUM SERPL-MCNC: 2 MG/DL (ref 1.6–2.6)
MAGNESIUM SERPL-MCNC: 2.3 MG/DL (ref 1.6–2.6)
MAGNESIUM SERPL-MCNC: 2.3 MG/DL (ref 1.6–2.6)
MCH RBC QN AUTO: 28.8 PG (ref 27–31)
MCH RBC QN AUTO: 29.1 PG (ref 27–31)
MCH RBC QN AUTO: 29.4 PG (ref 27–31)
MCH RBC QN AUTO: 29.4 PG (ref 27–31)
MCH RBC QN AUTO: 29.5 PG (ref 27–31)
MCH RBC QN AUTO: 29.5 PG (ref 27–31)
MCH RBC QN AUTO: 29.6 PG (ref 27–31)
MCH RBC QN AUTO: 29.7 PG (ref 27–31)
MCH RBC QN AUTO: 29.7 PG (ref 27–31)
MCH RBC QN AUTO: 29.9 PG (ref 27–31)
MCH RBC QN AUTO: 30 PG (ref 27–31)
MCH RBC QN AUTO: 30.1 PG (ref 27–31)
MCH RBC QN AUTO: 30.2 PG (ref 27–31)
MCH RBC QN AUTO: 30.3 PG (ref 27–31)
MCH RBC QN AUTO: 30.3 PG (ref 27–31)
MCH RBC QN AUTO: 30.5 PG (ref 27–31)
MCH RBC QN AUTO: 30.6 PG (ref 27–31)
MCH RBC QN AUTO: 30.7 PG (ref 27–31)
MCH RBC QN AUTO: 30.8 PG (ref 27–31)
MCH RBC QN AUTO: 30.9 PG (ref 27–31)
MCH RBC QN AUTO: 31.4 PG (ref 27–31)
MCH RBC QN AUTO: 31.5 PG (ref 27–31)
MCH RBC QN AUTO: 31.6 PG (ref 27–31)
MCH RBC QN AUTO: 31.7 PG (ref 27–31)
MCH RBC QN AUTO: 31.8 PG (ref 27–31)
MCH RBC QN AUTO: 31.9 PG (ref 27–31)
MCH RBC QN AUTO: 31.9 PG (ref 27–31)
MCH RBC QN AUTO: 32 PG (ref 27–31)
MCH RBC QN AUTO: 32.1 PG (ref 27–31)
MCH RBC QN AUTO: 32.1 PG (ref 27–31)
MCH RBC QN AUTO: 32.2 PG (ref 27–31)
MCH RBC QN AUTO: 32.3 PG (ref 27–31)
MCH RBC QN AUTO: 32.4 PG (ref 27–31)
MCH RBC QN AUTO: 32.5 PG (ref 27–31)
MCH RBC QN AUTO: 32.6 PG (ref 27–31)
MCHC RBC AUTO-ENTMCNC: 29.6 G/DL (ref 32–36)
MCHC RBC AUTO-ENTMCNC: 29.7 G/DL (ref 32–36)
MCHC RBC AUTO-ENTMCNC: 30.2 G/DL (ref 32–36)
MCHC RBC AUTO-ENTMCNC: 30.6 G/DL (ref 32–36)
MCHC RBC AUTO-ENTMCNC: 30.7 G/DL (ref 32–36)
MCHC RBC AUTO-ENTMCNC: 30.8 G/DL (ref 32–36)
MCHC RBC AUTO-ENTMCNC: 30.9 G/DL (ref 32–36)
MCHC RBC AUTO-ENTMCNC: 30.9 G/DL (ref 32–36)
MCHC RBC AUTO-ENTMCNC: 31 G/DL (ref 32–36)
MCHC RBC AUTO-ENTMCNC: 31.1 G/DL (ref 32–36)
MCHC RBC AUTO-ENTMCNC: 31.1 G/DL (ref 32–36)
MCHC RBC AUTO-ENTMCNC: 31.2 G/DL (ref 32–36)
MCHC RBC AUTO-ENTMCNC: 31.3 G/DL (ref 32–36)
MCHC RBC AUTO-ENTMCNC: 31.4 G/DL (ref 32–36)
MCHC RBC AUTO-ENTMCNC: 31.5 G/DL (ref 32–36)
MCHC RBC AUTO-ENTMCNC: 31.6 G/DL (ref 32–36)
MCHC RBC AUTO-ENTMCNC: 31.7 G/DL (ref 32–36)
MCHC RBC AUTO-ENTMCNC: 31.9 G/DL (ref 32–36)
MCHC RBC AUTO-ENTMCNC: 32 G/DL (ref 32–36)
MCHC RBC AUTO-ENTMCNC: 32.1 G/DL (ref 32–36)
MCHC RBC AUTO-ENTMCNC: 32.2 G/DL (ref 32–36)
MCHC RBC AUTO-ENTMCNC: 32.3 G/DL (ref 32–36)
MCHC RBC AUTO-ENTMCNC: 32.3 G/DL (ref 32–36)
MCHC RBC AUTO-ENTMCNC: 32.6 G/DL (ref 32–36)
MCHC RBC AUTO-ENTMCNC: 32.8 G/DL (ref 32–36)
MCHC RBC AUTO-ENTMCNC: 32.9 G/DL (ref 32–36)
MCHC RBC AUTO-ENTMCNC: 33 G/DL (ref 32–36)
MCHC RBC AUTO-ENTMCNC: 33.1 G/DL (ref 32–36)
MCHC RBC AUTO-ENTMCNC: 33.2 G/DL (ref 32–36)
MCHC RBC AUTO-ENTMCNC: 33.3 G/DL (ref 32–36)
MCHC RBC AUTO-ENTMCNC: 33.5 G/DL (ref 32–36)
MCHC RBC AUTO-ENTMCNC: 33.7 G/DL (ref 32–36)
MCHC RBC AUTO-ENTMCNC: 33.8 G/DL (ref 32–36)
MCHC RBC AUTO-ENTMCNC: 33.8 G/DL (ref 32–36)
MCHC RBC AUTO-ENTMCNC: 33.9 G/DL (ref 32–36)
MCHC RBC AUTO-ENTMCNC: 34.5 G/DL (ref 32–36)
MCHC RBC AUTO-ENTMCNC: 35 G/DL (ref 32–36)
MCHC RBC AUTO-ENTMCNC: 35.4 G/DL (ref 32–36)
MCHC RBC AUTO-ENTMCNC: 36.2 G/DL (ref 32–36)
MCV RBC AUTO: 100 FL (ref 82–98)
MCV RBC AUTO: 101 FL (ref 82–98)
MCV RBC AUTO: 104 FL (ref 82–98)
MCV RBC AUTO: 85 FL (ref 82–98)
MCV RBC AUTO: 85 FL (ref 82–98)
MCV RBC AUTO: 86 FL (ref 82–98)
MCV RBC AUTO: 88 FL (ref 82–98)
MCV RBC AUTO: 89 FL (ref 82–98)
MCV RBC AUTO: 92 FL (ref 82–98)
MCV RBC AUTO: 92 FL (ref 82–98)
MCV RBC AUTO: 94 FL (ref 82–98)
MCV RBC AUTO: 95 FL (ref 82–98)
MCV RBC AUTO: 96 FL (ref 82–98)
MCV RBC AUTO: 97 FL (ref 82–98)
MCV RBC AUTO: 98 FL (ref 82–98)
MCV RBC AUTO: 99 FL (ref 82–98)
METAMYELOCYTES NFR BLD MANUAL: 2 %
METAMYELOCYTES NFR BLD MANUAL: 4 %
METAMYELOCYTES NFR BLD MANUAL: 5 %
METAMYELOCYTES NFR BLD MANUAL: 5 %
METAMYELOCYTES NFR BLD MANUAL: 6 %
MICROSCOPIC COMMENT: ABNORMAL
MICROSCOPIC COMMENT: NORMAL
MICROSCOPIC COMMENT: NORMAL
MICROSCOPIC EXAM: NORMAL
MIN VOL: 13.6
MODE: ABNORMAL
MONOCYTES # BLD AUTO: 0.1 K/UL (ref 0.3–1)
MONOCYTES # BLD AUTO: 0.4 K/UL (ref 0.3–1)
MONOCYTES # BLD AUTO: 0.4 K/UL (ref 0.3–1)
MONOCYTES # BLD AUTO: 0.5 K/UL (ref 0.3–1)
MONOCYTES # BLD AUTO: 0.6 K/UL (ref 0.3–1)
MONOCYTES # BLD AUTO: 0.7 K/UL (ref 0.3–1)
MONOCYTES # BLD AUTO: 0.8 K/UL (ref 0.3–1)
MONOCYTES # BLD AUTO: 0.8 K/UL (ref 0.3–1)
MONOCYTES # BLD AUTO: 0.9 K/UL (ref 0.3–1)
MONOCYTES # BLD AUTO: 1 K/UL (ref 0.3–1)
MONOCYTES # BLD AUTO: 1 K/UL (ref 0.3–1)
MONOCYTES # BLD AUTO: 1.4 K/UL (ref 0.3–1)
MONOCYTES # BLD AUTO: ABNORMAL K/UL (ref 0.3–1)
MONOCYTES NFR BLD: 0 % (ref 4–15)
MONOCYTES NFR BLD: 0 % (ref 4–15)
MONOCYTES NFR BLD: 1 % (ref 4–15)
MONOCYTES NFR BLD: 10.1 % (ref 4–15)
MONOCYTES NFR BLD: 10.3 % (ref 4–15)
MONOCYTES NFR BLD: 10.3 % (ref 4–15)
MONOCYTES NFR BLD: 10.4 % (ref 4–15)
MONOCYTES NFR BLD: 11 % (ref 4–15)
MONOCYTES NFR BLD: 11.6 % (ref 4–15)
MONOCYTES NFR BLD: 12 % (ref 4–15)
MONOCYTES NFR BLD: 12 % (ref 4–15)
MONOCYTES NFR BLD: 13 % (ref 4–15)
MONOCYTES NFR BLD: 13.3 % (ref 4–15)
MONOCYTES NFR BLD: 2 % (ref 4–15)
MONOCYTES NFR BLD: 3 % (ref 4–15)
MONOCYTES NFR BLD: 4.7 % (ref 4–15)
MONOCYTES NFR BLD: 45.5 % (ref 4–15)
MONOCYTES NFR BLD: 5 % (ref 4–15)
MONOCYTES NFR BLD: 5 % (ref 4–15)
MONOCYTES NFR BLD: 6 % (ref 4–15)
MONOCYTES NFR BLD: 6.4 % (ref 4–15)
MONOCYTES NFR BLD: 6.5 % (ref 4–15)
MONOCYTES NFR BLD: 6.7 % (ref 4–15)
MONOCYTES NFR BLD: 7 % (ref 4–15)
MONOCYTES NFR BLD: 7.4 % (ref 4–15)
MONOCYTES NFR BLD: 7.8 % (ref 4–15)
MONOCYTES NFR BLD: 7.9 % (ref 4–15)
MONOCYTES NFR BLD: 8 % (ref 4–15)
MONOCYTES NFR BLD: 8 % (ref 4–15)
MONOCYTES NFR BLD: 8.1 % (ref 4–15)
MONOCYTES NFR BLD: 8.1 % (ref 4–15)
MONOCYTES NFR BLD: 8.2 % (ref 4–15)
MONOCYTES NFR BLD: 8.3 % (ref 4–15)
MONOCYTES NFR BLD: 8.8 % (ref 4–15)
MONOCYTES NFR BLD: 8.9 % (ref 4–15)
MONOCYTES NFR BLD: 9.2 % (ref 4–15)
MONOCYTES NFR BLD: 9.4 % (ref 4–15)
MONOCYTES NFR BLD: 9.6 % (ref 4–15)
MV PEAK A VEL: 0.52 M/S
MV PEAK E VEL: 0.71 M/S
MV STENOSIS PRESSURE HALF TIME: 70.32 MS
MV VALVE AREA P 1/2 METHOD: 3.13 CM2
MYCOPLASMA PNEUMONIAE: NOT DETECTED
MYCOPLASMA PNEUMONIAE: NOT DETECTED
MYELOCYTES NFR BLD MANUAL: 1 %
MYELOCYTES NFR BLD MANUAL: 1 %
MYELOCYTES NFR BLD MANUAL: 2 %
NEUTROPHILS # BLD AUTO: 0 K/UL (ref 1.8–7.7)
NEUTROPHILS # BLD AUTO: 18.9 K/UL (ref 1.8–7.7)
NEUTROPHILS # BLD AUTO: 3.6 K/UL (ref 1.8–7.7)
NEUTROPHILS # BLD AUTO: 3.6 K/UL (ref 1.8–7.7)
NEUTROPHILS # BLD AUTO: 3.7 K/UL (ref 1.8–7.7)
NEUTROPHILS # BLD AUTO: 3.9 K/UL (ref 1.8–7.7)
NEUTROPHILS # BLD AUTO: 3.9 K/UL (ref 1.8–7.7)
NEUTROPHILS # BLD AUTO: 4 K/UL (ref 1.8–7.7)
NEUTROPHILS # BLD AUTO: 4 K/UL (ref 1.8–7.7)
NEUTROPHILS # BLD AUTO: 4.1 K/UL (ref 1.8–7.7)
NEUTROPHILS # BLD AUTO: 4.1 K/UL (ref 1.8–7.7)
NEUTROPHILS # BLD AUTO: 4.4 K/UL (ref 1.8–7.7)
NEUTROPHILS # BLD AUTO: 4.5 K/UL (ref 1.8–7.7)
NEUTROPHILS # BLD AUTO: 4.5 K/UL (ref 1.8–7.7)
NEUTROPHILS # BLD AUTO: 4.7 K/UL (ref 1.8–7.7)
NEUTROPHILS # BLD AUTO: 5.3 K/UL (ref 1.8–7.7)
NEUTROPHILS # BLD AUTO: 5.4 K/UL (ref 1.8–7.7)
NEUTROPHILS # BLD AUTO: 5.5 K/UL (ref 1.8–7.7)
NEUTROPHILS # BLD AUTO: 5.6 K/UL (ref 1.8–7.7)
NEUTROPHILS # BLD AUTO: 6.1 K/UL (ref 1.8–7.7)
NEUTROPHILS # BLD AUTO: 6.3 K/UL (ref 1.8–7.7)
NEUTROPHILS # BLD AUTO: 6.4 K/UL (ref 1.8–7.7)
NEUTROPHILS # BLD AUTO: 7.1 K/UL (ref 1.8–7.7)
NEUTROPHILS # BLD AUTO: 7.1 K/UL (ref 1.8–7.7)
NEUTROPHILS # BLD AUTO: 7.2 K/UL (ref 1.8–7.7)
NEUTROPHILS # BLD AUTO: 7.5 K/UL (ref 1.8–7.7)
NEUTROPHILS # BLD AUTO: 7.9 K/UL (ref 1.8–7.7)
NEUTROPHILS # BLD AUTO: 9.2 K/UL (ref 1.8–7.7)
NEUTROPHILS NFR BLD: 32 % (ref 38–73)
NEUTROPHILS NFR BLD: 47 % (ref 38–73)
NEUTROPHILS NFR BLD: 48 % (ref 38–73)
NEUTROPHILS NFR BLD: 55 % (ref 38–73)
NEUTROPHILS NFR BLD: 57 % (ref 38–73)
NEUTROPHILS NFR BLD: 61 % (ref 38–73)
NEUTROPHILS NFR BLD: 61 % (ref 38–73)
NEUTROPHILS NFR BLD: 67.5 % (ref 38–73)
NEUTROPHILS NFR BLD: 67.7 % (ref 38–73)
NEUTROPHILS NFR BLD: 68 % (ref 38–73)
NEUTROPHILS NFR BLD: 68 % (ref 38–73)
NEUTROPHILS NFR BLD: 70.2 % (ref 38–73)
NEUTROPHILS NFR BLD: 71.8 % (ref 38–73)
NEUTROPHILS NFR BLD: 71.9 % (ref 38–73)
NEUTROPHILS NFR BLD: 72 % (ref 38–73)
NEUTROPHILS NFR BLD: 73 % (ref 38–73)
NEUTROPHILS NFR BLD: 73 % (ref 38–73)
NEUTROPHILS NFR BLD: 73.4 % (ref 38–73)
NEUTROPHILS NFR BLD: 73.5 % (ref 38–73)
NEUTROPHILS NFR BLD: 73.7 % (ref 38–73)
NEUTROPHILS NFR BLD: 73.8 % (ref 38–73)
NEUTROPHILS NFR BLD: 75 % (ref 38–73)
NEUTROPHILS NFR BLD: 75.5 % (ref 38–73)
NEUTROPHILS NFR BLD: 76 % (ref 38–73)
NEUTROPHILS NFR BLD: 76 % (ref 38–73)
NEUTROPHILS NFR BLD: 77 % (ref 38–73)
NEUTROPHILS NFR BLD: 78.2 % (ref 38–73)
NEUTROPHILS NFR BLD: 78.4 % (ref 38–73)
NEUTROPHILS NFR BLD: 78.5 % (ref 38–73)
NEUTROPHILS NFR BLD: 79.7 % (ref 38–73)
NEUTROPHILS NFR BLD: 79.7 % (ref 38–73)
NEUTROPHILS NFR BLD: 79.8 % (ref 38–73)
NEUTROPHILS NFR BLD: 80.1 % (ref 38–73)
NEUTROPHILS NFR BLD: 80.1 % (ref 38–73)
NEUTROPHILS NFR BLD: 81.5 % (ref 38–73)
NEUTROPHILS NFR BLD: 82.3 % (ref 38–73)
NEUTROPHILS NFR BLD: 82.6 % (ref 38–73)
NEUTROPHILS NFR BLD: 84.2 % (ref 38–73)
NEUTROPHILS NFR BLD: 85 % (ref 38–73)
NEUTROPHILS NFR BLD: 85.1 % (ref 38–73)
NEUTROPHILS NFR BLD: 89.9 % (ref 38–73)
NEUTROPHILS NFR BLD: 9 % (ref 38–73)
NEUTROPHILS NFR BLD: 90 % (ref 38–73)
NEUTROPHILS NFR BLD: 91 % (ref 38–73)
NEUTROPHILS NFR BLD: 95 % (ref 38–73)
NEUTROPHILS NFR BLD: 95 % (ref 38–73)
NEUTS BAND NFR BLD MANUAL: 10 %
NEUTS BAND NFR BLD MANUAL: 11 %
NEUTS BAND NFR BLD MANUAL: 11 %
NEUTS BAND NFR BLD MANUAL: 14 %
NEUTS BAND NFR BLD MANUAL: 21 %
NEUTS BAND NFR BLD MANUAL: 25 %
NEUTS BAND NFR BLD MANUAL: 3 %
NEUTS BAND NFR BLD MANUAL: 3 %
NEUTS BAND NFR BLD MANUAL: 4 %
NEUTS BAND NFR BLD MANUAL: 5 %
NEUTS BAND NFR BLD MANUAL: 5 %
NEUTS BAND NFR BLD MANUAL: 8 %
NEUTS BAND NFR BLD MANUAL: 8 %
NITRITE UR QL STRIP: NEGATIVE
NITRITE UR QL STRIP: POSITIVE
NONHDLC SERPL-MCNC: 100 MG/DL
NRBC BLD-RTO: 0 /100 WBC
NRBC BLD-RTO: 1 /100 WBC
NRBC BLD-RTO: 1 /100 WBC
NRBC BLD-RTO: 2 /100 WBC
NRBC BLD-RTO: 5 /100 WBC
NUM UNITS TRANS FFP: NORMAL
NUM UNITS TRANS PACKED RBC: NORMAL
O+P STL MICRO: NORMAL
OB PNL STL: POSITIVE
OSMOLALITY UR: 180 MOSM/KG (ref 50–1200)
OVALOCYTES BLD QL SMEAR: ABNORMAL
PCO2 BLDA: 15.3 MMHG (ref 35–45)
PCO2 BLDA: 24 MMHG (ref 35–45)
PCO2 BLDA: 24.8 MMHG (ref 35–45)
PCO2 BLDA: 30.6 MMHG (ref 35–45)
PCO2 BLDA: 31.9 MMHG (ref 35–45)
PEEP: 5
PH SMN: 7.25 [PH] (ref 7.35–7.45)
PH SMN: 7.33 [PH] (ref 7.35–7.45)
PH SMN: 7.38 [PH] (ref 7.35–7.45)
PH SMN: 7.42 [PH] (ref 7.35–7.45)
PH SMN: 7.46 [PH] (ref 7.35–7.45)
PH UR STRIP: 6 [PH] (ref 5–8)
PH UR STRIP: 7 [PH] (ref 5–8)
PHOSPHATE SERPL-MCNC: 11.8 MG/DL (ref 2.7–4.5)
PHOSPHATE SERPL-MCNC: 2 MG/DL (ref 2.7–4.5)
PHOSPHATE SERPL-MCNC: 2.1 MG/DL (ref 2.7–4.5)
PHOSPHATE SERPL-MCNC: 2.1 MG/DL (ref 2.7–4.5)
PHOSPHATE SERPL-MCNC: 2.3 MG/DL (ref 2.7–4.5)
PHOSPHATE SERPL-MCNC: 2.4 MG/DL (ref 2.7–4.5)
PHOSPHATE SERPL-MCNC: 2.6 MG/DL (ref 2.7–4.5)
PHOSPHATE SERPL-MCNC: 2.7 MG/DL (ref 2.7–4.5)
PHOSPHATE SERPL-MCNC: 2.8 MG/DL (ref 2.7–4.5)
PHOSPHATE SERPL-MCNC: 2.9 MG/DL (ref 2.7–4.5)
PHOSPHATE SERPL-MCNC: 3.4 MG/DL (ref 2.7–4.5)
PHOSPHATE SERPL-MCNC: 3.4 MG/DL (ref 2.7–4.5)
PHOSPHATE SERPL-MCNC: 3.5 MG/DL (ref 2.7–4.5)
PHOSPHATE SERPL-MCNC: 3.6 MG/DL (ref 2.7–4.5)
PHOSPHATE SERPL-MCNC: 3.6 MG/DL (ref 2.7–4.5)
PHOSPHATE SERPL-MCNC: 3.7 MG/DL (ref 2.7–4.5)
PHOSPHATE SERPL-MCNC: 3.9 MG/DL (ref 2.7–4.5)
PHOSPHATE SERPL-MCNC: 4.7 MG/DL (ref 2.7–4.5)
PIP: 22
PISA TR MAX VEL: 2.86 M/S
PLATELET # BLD AUTO: 102 K/UL (ref 150–350)
PLATELET # BLD AUTO: 103 K/UL (ref 150–350)
PLATELET # BLD AUTO: 103 K/UL (ref 150–350)
PLATELET # BLD AUTO: 113 K/UL (ref 150–350)
PLATELET # BLD AUTO: 113 K/UL (ref 150–350)
PLATELET # BLD AUTO: 115 K/UL (ref 150–350)
PLATELET # BLD AUTO: 117 K/UL (ref 150–350)
PLATELET # BLD AUTO: 117 K/UL (ref 150–350)
PLATELET # BLD AUTO: 119 K/UL (ref 150–350)
PLATELET # BLD AUTO: 124 K/UL (ref 150–350)
PLATELET # BLD AUTO: 134 K/UL (ref 150–350)
PLATELET # BLD AUTO: 141 K/UL (ref 150–350)
PLATELET # BLD AUTO: 143 K/UL (ref 150–350)
PLATELET # BLD AUTO: 145 K/UL (ref 150–350)
PLATELET # BLD AUTO: 153 K/UL (ref 150–350)
PLATELET # BLD AUTO: 155 K/UL (ref 150–350)
PLATELET # BLD AUTO: 159 K/UL (ref 150–350)
PLATELET # BLD AUTO: 160 K/UL (ref 150–350)
PLATELET # BLD AUTO: 164 K/UL (ref 150–350)
PLATELET # BLD AUTO: 167 K/UL (ref 150–350)
PLATELET # BLD AUTO: 171 K/UL (ref 150–350)
PLATELET # BLD AUTO: 177 K/UL (ref 150–350)
PLATELET # BLD AUTO: 181 K/UL (ref 150–350)
PLATELET # BLD AUTO: 181 K/UL (ref 150–350)
PLATELET # BLD AUTO: 183 K/UL (ref 150–350)
PLATELET # BLD AUTO: 186 K/UL (ref 150–350)
PLATELET # BLD AUTO: 186 K/UL (ref 150–350)
PLATELET # BLD AUTO: 190 K/UL (ref 150–350)
PLATELET # BLD AUTO: 194 K/UL (ref 150–350)
PLATELET # BLD AUTO: 195 K/UL (ref 150–350)
PLATELET # BLD AUTO: 196 K/UL (ref 150–350)
PLATELET # BLD AUTO: 202 K/UL (ref 150–350)
PLATELET # BLD AUTO: 214 K/UL (ref 150–350)
PLATELET # BLD AUTO: 226 K/UL (ref 150–350)
PLATELET # BLD AUTO: 236 K/UL (ref 150–350)
PLATELET # BLD AUTO: 245 K/UL (ref 150–350)
PLATELET # BLD AUTO: 260 K/UL (ref 150–350)
PLATELET # BLD AUTO: 266 K/UL (ref 150–350)
PLATELET # BLD AUTO: 302 K/UL (ref 150–350)
PLATELET # BLD AUTO: 309 K/UL (ref 150–350)
PLATELET # BLD AUTO: 43 K/UL (ref 150–350)
PLATELET # BLD AUTO: 46 K/UL (ref 150–350)
PLATELET # BLD AUTO: 50 K/UL (ref 150–350)
PLATELET # BLD AUTO: 52 K/UL (ref 150–350)
PLATELET # BLD AUTO: 67 K/UL (ref 150–350)
PLATELET # BLD AUTO: 67 K/UL (ref 150–350)
PLATELET # BLD AUTO: 69 K/UL (ref 150–350)
PLATELET # BLD AUTO: 76 K/UL (ref 150–350)
PLATELET # BLD AUTO: 79 K/UL (ref 150–350)
PLATELET # BLD AUTO: 89 K/UL (ref 150–350)
PLATELET # BLD AUTO: 91 K/UL (ref 150–350)
PLATELET # BLD AUTO: 93 K/UL (ref 150–350)
PLATELET # BLD AUTO: 99 K/UL (ref 150–350)
PLATELET BLD QL SMEAR: ABNORMAL
PMV BLD AUTO: 10.3 FL (ref 9.2–12.9)
PMV BLD AUTO: 10.4 FL (ref 9.2–12.9)
PMV BLD AUTO: 10.6 FL (ref 9.2–12.9)
PMV BLD AUTO: 10.7 FL (ref 9.2–12.9)
PMV BLD AUTO: 10.8 FL (ref 9.2–12.9)
PMV BLD AUTO: 10.9 FL (ref 9.2–12.9)
PMV BLD AUTO: 10.9 FL (ref 9.2–12.9)
PMV BLD AUTO: 11 FL (ref 9.2–12.9)
PMV BLD AUTO: 11.1 FL (ref 9.2–12.9)
PMV BLD AUTO: 11.2 FL (ref 9.2–12.9)
PMV BLD AUTO: 11.2 FL (ref 9.2–12.9)
PMV BLD AUTO: 11.3 FL (ref 9.2–12.9)
PMV BLD AUTO: 11.4 FL (ref 9.2–12.9)
PMV BLD AUTO: 11.4 FL (ref 9.2–12.9)
PMV BLD AUTO: 11.5 FL (ref 9.2–12.9)
PMV BLD AUTO: 11.5 FL (ref 9.2–12.9)
PMV BLD AUTO: 11.6 FL (ref 9.2–12.9)
PMV BLD AUTO: 11.7 FL (ref 9.2–12.9)
PMV BLD AUTO: 11.7 FL (ref 9.2–12.9)
PMV BLD AUTO: 11.8 FL (ref 9.2–12.9)
PMV BLD AUTO: 11.9 FL (ref 9.2–12.9)
PMV BLD AUTO: 12 FL (ref 9.2–12.9)
PMV BLD AUTO: 12 FL (ref 9.2–12.9)
PMV BLD AUTO: 12.1 FL (ref 9.2–12.9)
PMV BLD AUTO: 12.1 FL (ref 9.2–12.9)
PMV BLD AUTO: 12.2 FL (ref 9.2–12.9)
PMV BLD AUTO: 12.3 FL (ref 9.2–12.9)
PMV BLD AUTO: 12.5 FL (ref 9.2–12.9)
PMV BLD AUTO: 12.6 FL (ref 9.2–12.9)
PMV BLD AUTO: 12.6 FL (ref 9.2–12.9)
PMV BLD AUTO: 12.7 FL (ref 9.2–12.9)
PMV BLD AUTO: 12.8 FL (ref 9.2–12.9)
PMV BLD AUTO: 13 FL (ref 9.2–12.9)
PMV BLD AUTO: 13 FL (ref 9.2–12.9)
PMV BLD AUTO: 13.3 FL (ref 9.2–12.9)
PMV BLD AUTO: ABNORMAL FL (ref 9.2–12.9)
PO2 BLDA: 110 MMHG (ref 80–100)
PO2 BLDA: 198 MMHG (ref 80–100)
PO2 BLDA: 203 MMHG (ref 80–100)
PO2 BLDA: 205 MMHG (ref 80–100)
PO2 BLDA: 42 MMHG (ref 40–60)
POC BE: -1 MMOL/L
POC BE: -11 MMOL/L
POC BE: -17 MMOL/L
POC BE: -18 MMOL/L
POC BE: -5 MMOL/L
POC IONIZED CALCIUM: 0.97 MMOL/L (ref 1.06–1.42)
POC IONIZED CALCIUM: 1.22 MMOL/L (ref 1.06–1.42)
POC SATURATED O2: 100 % (ref 95–100)
POC SATURATED O2: 78 % (ref 95–100)
POC SATURATED O2: 98 % (ref 95–100)
POC TCO2: 11 MMOL/L (ref 23–27)
POC TCO2: 15 MMOL/L (ref 24–29)
POC TCO2: 21 MMOL/L (ref 23–27)
POC TCO2: 24 MMOL/L (ref 23–27)
POC TCO2: 9 MMOL/L (ref 23–27)
POCT GLUCOSE: 107 MG/DL (ref 70–110)
POCT GLUCOSE: 122 MG/DL (ref 70–110)
POCT GLUCOSE: 155 MG/DL (ref 70–110)
POCT GLUCOSE: 248 MG/DL (ref 70–110)
POCT GLUCOSE: 342 MG/DL (ref 70–110)
POCT GLUCOSE: 366 MG/DL (ref 70–110)
POCT GLUCOSE: 73 MG/DL (ref 70–110)
POCT GLUCOSE: 80 MG/DL (ref 70–110)
POCT GLUCOSE: 87 MG/DL (ref 70–110)
POIKILOCYTOSIS BLD QL SMEAR: SLIGHT
POLYCHROMASIA BLD QL SMEAR: ABNORMAL
POTASSIUM BLD-SCNC: 7.5 MMOL/L (ref 3.5–5.1)
POTASSIUM BLD-SCNC: 8.2 MMOL/L (ref 3.5–5.1)
POTASSIUM SERPL-SCNC: 2.6 MMOL/L (ref 3.5–5.1)
POTASSIUM SERPL-SCNC: 2.9 MMOL/L (ref 3.5–5.1)
POTASSIUM SERPL-SCNC: 3.1 MMOL/L (ref 3.5–5.1)
POTASSIUM SERPL-SCNC: 3.2 MMOL/L (ref 3.5–5.1)
POTASSIUM SERPL-SCNC: 3.3 MMOL/L (ref 3.5–5.1)
POTASSIUM SERPL-SCNC: 3.4 MMOL/L (ref 3.5–5.1)
POTASSIUM SERPL-SCNC: 3.5 MMOL/L (ref 3.5–5.1)
POTASSIUM SERPL-SCNC: 3.6 MMOL/L (ref 3.5–5.1)
POTASSIUM SERPL-SCNC: 3.7 MMOL/L (ref 3.5–5.1)
POTASSIUM SERPL-SCNC: 3.8 MMOL/L (ref 3.5–5.1)
POTASSIUM SERPL-SCNC: 3.9 MMOL/L (ref 3.5–5.1)
POTASSIUM SERPL-SCNC: 4 MMOL/L (ref 3.5–5.1)
POTASSIUM SERPL-SCNC: 4.1 MMOL/L (ref 3.5–5.1)
POTASSIUM SERPL-SCNC: 4.2 MMOL/L (ref 3.5–5.1)
POTASSIUM SERPL-SCNC: 4.3 MMOL/L (ref 3.5–5.1)
POTASSIUM SERPL-SCNC: 4.3 MMOL/L (ref 3.5–5.1)
POTASSIUM SERPL-SCNC: 4.4 MMOL/L (ref 3.5–5.1)
POTASSIUM SERPL-SCNC: 4.5 MMOL/L (ref 3.5–5.1)
POTASSIUM SERPL-SCNC: 4.6 MMOL/L (ref 3.5–5.1)
POTASSIUM SERPL-SCNC: 4.8 MMOL/L (ref 3.5–5.1)
POTASSIUM SERPL-SCNC: 4.8 MMOL/L (ref 3.5–5.1)
POTASSIUM SERPL-SCNC: 5 MMOL/L (ref 3.5–5.1)
POTASSIUM SERPL-SCNC: 5.1 MMOL/L (ref 3.5–5.1)
POTASSIUM SERPL-SCNC: 7.3 MMOL/L (ref 3.5–5.1)
POTASSIUM SERPL-SCNC: 7.3 MMOL/L (ref 3.5–5.1)
POTASSIUM UR-SCNC: 12 MMOL/L (ref 15–95)
POTASSIUM UR-SCNC: 27 MMOL/L (ref 15–95)
PREALB SERPL-MCNC: 12 MG/DL (ref 20–43)
PROCALCITONIN SERPL IA-MCNC: 1.78 NG/ML
PROMYELOCYTES NFR BLD MANUAL: 1 %
PROT SERPL-MCNC: 2.5 G/DL (ref 6–8.4)
PROT SERPL-MCNC: 4.2 G/DL (ref 6–8.4)
PROT SERPL-MCNC: 4.3 G/DL (ref 6–8.4)
PROT SERPL-MCNC: 5.2 G/DL (ref 6–8.4)
PROT SERPL-MCNC: 5.6 G/DL (ref 6–8.4)
PROT SERPL-MCNC: 5.8 G/DL (ref 6–8.4)
PROT SERPL-MCNC: 6.2 G/DL (ref 6–8.4)
PROT SERPL-MCNC: 6.2 G/DL (ref 6–8.4)
PROT SERPL-MCNC: 6.3 G/DL (ref 6–8.4)
PROT SERPL-MCNC: 6.4 G/DL (ref 6–8.4)
PROT SERPL-MCNC: 6.5 G/DL (ref 6–8.4)
PROT SERPL-MCNC: 6.6 G/DL (ref 6–8.4)
PROT SERPL-MCNC: 6.7 G/DL (ref 6–8.4)
PROT SERPL-MCNC: 6.7 G/DL (ref 6–8.4)
PROT SERPL-MCNC: 6.8 G/DL (ref 6–8.4)
PROT SERPL-MCNC: 6.8 G/DL (ref 6–8.4)
PROT SERPL-MCNC: 6.9 G/DL (ref 6–8.4)
PROT SERPL-MCNC: 6.9 G/DL (ref 6–8.4)
PROT SERPL-MCNC: 7 G/DL (ref 6–8.4)
PROT SERPL-MCNC: 7.2 G/DL (ref 6–8.4)
PROT SERPL-MCNC: 7.4 G/DL (ref 6–8.4)
PROT UR QL STRIP: ABNORMAL
PROT UR QL STRIP: NEGATIVE
PROTHROMBIN TIME: 10.7 SEC (ref 9–12.5)
PROTHROMBIN TIME: 12.1 SEC (ref 9–12.5)
PROTHROMBIN TIME: 12.8 SEC (ref 9–12.5)
PROTHROMBIN TIME: 9.8 SEC (ref 9–12.5)
PTH-INTACT SERPL-MCNC: 355.6 PG/ML (ref 9–77)
PULM VEIN S/D RATIO: 0.97
PV PEAK D VEL: 0.39 M/S
PV PEAK S VEL: 0.38 M/S
PV PEAK VELOCITY: 1.02 CM/S
RA MAJOR: 3.89 CM
RA PRESSURE: 3 MMHG
RA WIDTH: 3.62 CM
RBC # BLD AUTO: 1.95 M/UL (ref 4.6–6.2)
RBC # BLD AUTO: 2.08 M/UL (ref 4.6–6.2)
RBC # BLD AUTO: 2.13 M/UL (ref 4.6–6.2)
RBC # BLD AUTO: 2.26 M/UL (ref 4.6–6.2)
RBC # BLD AUTO: 2.27 M/UL (ref 4.6–6.2)
RBC # BLD AUTO: 2.45 M/UL (ref 4.6–6.2)
RBC # BLD AUTO: 2.46 M/UL (ref 4.6–6.2)
RBC # BLD AUTO: 2.56 M/UL (ref 4.6–6.2)
RBC # BLD AUTO: 2.61 M/UL (ref 4.6–6.2)
RBC # BLD AUTO: 2.64 M/UL (ref 4.6–6.2)
RBC # BLD AUTO: 2.78 M/UL (ref 4.6–6.2)
RBC # BLD AUTO: 2.81 M/UL (ref 4.6–6.2)
RBC # BLD AUTO: 2.83 M/UL (ref 4.6–6.2)
RBC # BLD AUTO: 2.88 M/UL (ref 4.6–6.2)
RBC # BLD AUTO: 2.91 M/UL (ref 4.6–6.2)
RBC # BLD AUTO: 2.92 M/UL (ref 4.6–6.2)
RBC # BLD AUTO: 2.95 M/UL (ref 4.6–6.2)
RBC # BLD AUTO: 2.97 M/UL (ref 4.6–6.2)
RBC # BLD AUTO: 2.99 M/UL (ref 4.6–6.2)
RBC # BLD AUTO: 3.03 M/UL (ref 4.6–6.2)
RBC # BLD AUTO: 3.03 M/UL (ref 4.6–6.2)
RBC # BLD AUTO: 3.04 M/UL (ref 4.6–6.2)
RBC # BLD AUTO: 3.09 M/UL (ref 4.6–6.2)
RBC # BLD AUTO: 3.15 M/UL (ref 4.6–6.2)
RBC # BLD AUTO: 3.19 M/UL (ref 4.6–6.2)
RBC # BLD AUTO: 3.21 M/UL (ref 4.6–6.2)
RBC # BLD AUTO: 3.22 M/UL (ref 4.6–6.2)
RBC # BLD AUTO: 3.22 M/UL (ref 4.6–6.2)
RBC # BLD AUTO: 3.24 M/UL (ref 4.6–6.2)
RBC # BLD AUTO: 3.25 M/UL (ref 4.6–6.2)
RBC # BLD AUTO: 3.27 M/UL (ref 4.6–6.2)
RBC # BLD AUTO: 3.33 M/UL (ref 4.6–6.2)
RBC # BLD AUTO: 3.35 M/UL (ref 4.6–6.2)
RBC # BLD AUTO: 3.41 M/UL (ref 4.6–6.2)
RBC # BLD AUTO: 3.52 M/UL (ref 4.6–6.2)
RBC # BLD AUTO: 3.57 M/UL (ref 4.6–6.2)
RBC # BLD AUTO: 3.59 M/UL (ref 4.6–6.2)
RBC # BLD AUTO: 3.62 M/UL (ref 4.6–6.2)
RBC # BLD AUTO: 3.65 M/UL (ref 4.6–6.2)
RBC # BLD AUTO: 3.66 M/UL (ref 4.6–6.2)
RBC # BLD AUTO: 3.7 M/UL (ref 4.6–6.2)
RBC # BLD AUTO: 3.77 M/UL (ref 4.6–6.2)
RBC # BLD AUTO: 3.79 M/UL (ref 4.6–6.2)
RBC # BLD AUTO: 3.8 M/UL (ref 4.6–6.2)
RBC # BLD AUTO: 3.81 M/UL (ref 4.6–6.2)
RBC # BLD AUTO: 3.84 M/UL (ref 4.6–6.2)
RBC # BLD AUTO: 3.88 M/UL (ref 4.6–6.2)
RBC # BLD AUTO: 3.94 M/UL (ref 4.6–6.2)
RBC # BLD AUTO: 4.03 M/UL (ref 4.6–6.2)
RBC # BLD AUTO: 4.03 M/UL (ref 4.6–6.2)
RBC # BLD AUTO: 4.37 M/UL (ref 4.6–6.2)
RBC # BLD AUTO: 4.46 M/UL (ref 4.6–6.2)
RBC #/AREA URNS AUTO: 2 /HPF (ref 0–4)
RBC #/AREA URNS AUTO: 29 /HPF (ref 0–4)
RBC #/AREA URNS AUTO: 6 /HPF (ref 0–4)
RBC #/AREA URNS AUTO: 85 /HPF (ref 0–4)
RBC #/AREA URNS HPF: 0 /HPF (ref 0–4)
RBC #/AREA URNS HPF: 1 /HPF (ref 0–4)
RBC #/AREA URNS HPF: 1 /HPF (ref 0–4)
RBC #/AREA URNS HPF: 5 /HPF (ref 0–4)
REASON FOR REFERRAL (NARRATIVE): NORMAL
REF LAB TEST METHOD: NORMAL
RESPIRATORY INFECTION PANEL SOURCE: ABNORMAL
RESPIRATORY INFECTION PANEL SOURCE: NORMAL
RIGHT VENTRICULAR END-DIASTOLIC DIMENSION: 3.03 CM
RSV RNA NPH QL NAA+NON-PROBE: NOT DETECTED
RSV RNA NPH QL NAA+NON-PROBE: NOT DETECTED
RV TISSUE DOPPLER FREE WALL SYSTOLIC VELOCITY 1 (APICAL 4 CHAMBER VIEW): 9.4 CM/S
RV+EV RNA NPH QL NAA+NON-PROBE: DETECTED
RV+EV RNA NPH QL NAA+NON-PROBE: NOT DETECTED
SAMPLE: ABNORMAL
SARS-COV-2 RDRP RESP QL NAA+PROBE: NEGATIVE
SARS-COV-2 RNA RESP QL NAA+PROBE: NOT DETECTED
SATURATED IRON: 14 % (ref 20–50)
SATURATED IRON: 15 % (ref 20–50)
SATURATED IRON: 30 % (ref 20–50)
SATURATED IRON: ABNORMAL % (ref 20–50)
SINUS: 2.79 CM
SITE: ABNORMAL
SODIUM BLD-SCNC: 127 MMOL/L (ref 136–145)
SODIUM BLD-SCNC: 131 MMOL/L (ref 136–145)
SODIUM SERPL-SCNC: 126 MMOL/L (ref 136–145)
SODIUM SERPL-SCNC: 128 MMOL/L (ref 136–145)
SODIUM SERPL-SCNC: 129 MMOL/L (ref 136–145)
SODIUM SERPL-SCNC: 130 MMOL/L (ref 136–145)
SODIUM SERPL-SCNC: 132 MMOL/L (ref 136–145)
SODIUM SERPL-SCNC: 133 MMOL/L (ref 136–145)
SODIUM SERPL-SCNC: 134 MMOL/L (ref 136–145)
SODIUM SERPL-SCNC: 134 MMOL/L (ref 136–145)
SODIUM SERPL-SCNC: 135 MMOL/L (ref 136–145)
SODIUM SERPL-SCNC: 136 MMOL/L (ref 136–145)
SODIUM SERPL-SCNC: 137 MMOL/L (ref 136–145)
SODIUM SERPL-SCNC: 138 MMOL/L (ref 136–145)
SODIUM SERPL-SCNC: 139 MMOL/L (ref 136–145)
SODIUM SERPL-SCNC: 139 MMOL/L (ref 136–145)
SODIUM SERPL-SCNC: 140 MMOL/L (ref 136–145)
SODIUM SERPL-SCNC: 141 MMOL/L (ref 136–145)
SODIUM SERPL-SCNC: 142 MMOL/L (ref 136–145)
SODIUM SERPL-SCNC: 143 MMOL/L (ref 136–145)
SODIUM SERPL-SCNC: 143 MMOL/L (ref 136–145)
SODIUM SERPL-SCNC: 144 MMOL/L (ref 136–145)
SODIUM SERPL-SCNC: 144 MMOL/L (ref 136–145)
SODIUM UR-SCNC: 29 MMOL/L (ref 20–250)
SODIUM UR-SCNC: <20 MMOL/L (ref 20–250)
SP GR UR STRIP: 1.01 (ref 1–1.03)
SP GR UR STRIP: 1.02 (ref 1–1.03)
SP GR UR STRIP: <=1.005 (ref 1–1.03)
SP02: 100
SP02: 92
SP02: 96
SPECIMEN SOURCE: NORMAL
SPECIMEN: NORMAL
SQUAMOUS #/AREA URNS AUTO: 0 /HPF
SQUAMOUS #/AREA URNS AUTO: 0 /HPF
SQUAMOUS #/AREA URNS AUTO: 1 /HPF
SQUAMOUS #/AREA URNS HPF: 2 /HPF
SQUAMOUS #/AREA URNS HPF: 2 /HPF
SQUAMOUS #/AREA URNS HPF: 6 /HPF
STJ: 3.02 CM
SUPPLEMENTAL DIAGNOSIS: NORMAL
T4 FREE SERPL-MCNC: 0.85 NG/DL (ref 0.71–1.51)
T4 FREE SERPL-MCNC: 0.87 NG/DL (ref 0.71–1.51)
TACROLIMUS BLD-MCNC: 1.5 NG/ML (ref 5–15)
TACROLIMUS BLD-MCNC: 2.1 NG/ML (ref 5–15)
TACROLIMUS BLD-MCNC: 2.9 NG/ML (ref 5–15)
TACROLIMUS BLD-MCNC: 2.9 NG/ML (ref 5–15)
TACROLIMUS BLD-MCNC: 3.2 NG/ML (ref 5–15)
TACROLIMUS BLD-MCNC: 3.2 NG/ML (ref 5–15)
TACROLIMUS BLD-MCNC: 3.3 NG/ML (ref 5–15)
TACROLIMUS BLD-MCNC: 4.8 NG/ML (ref 5–15)
TACROLIMUS BLD-MCNC: 4.9 NG/ML (ref 5–15)
TACROLIMUS BLD-MCNC: 5.2 NG/ML (ref 5–15)
TACROLIMUS BLD-MCNC: 5.4 NG/ML (ref 5–15)
TACROLIMUS BLD-MCNC: 6.1 NG/ML (ref 5–15)
TACROLIMUS BLD-MCNC: 6.7 NG/ML (ref 5–15)
TACROLIMUS BLD-MCNC: 6.8 NG/ML (ref 5–15)
TACROLIMUS BLD-MCNC: 6.9 NG/ML (ref 5–15)
TACROLIMUS BLD-MCNC: 6.9 NG/ML (ref 5–15)
TACROLIMUS BLD-MCNC: 7.4 NG/ML (ref 5–15)
TACROLIMUS BLD-MCNC: 7.5 NG/ML (ref 5–15)
TACROLIMUS BLD-MCNC: 7.6 NG/ML (ref 5–15)
TACROLIMUS BLD-MCNC: 8 NG/ML (ref 5–15)
TACROLIMUS BLD-MCNC: 8.1 NG/ML (ref 5–15)
TACROLIMUS BLD-MCNC: <1.5 NG/ML (ref 5–15)
TB INDURATION 48 - 72 HR READ: 0 MM
TDI LATERAL: 0.06 M/S
TDI SEPTAL: 0.06 M/S
TDI: 0.06 M/S
TOTAL IRON BINDING CAPACITY: 132 UG/DL (ref 250–450)
TOTAL IRON BINDING CAPACITY: 136 UG/DL (ref 250–450)
TOTAL IRON BINDING CAPACITY: 155 UG/DL (ref 250–450)
TOTAL IRON BINDING CAPACITY: 228 UG/DL (ref 250–450)
TR MAX PG: 33 MMHG
TRANS PLATPHERESIS VOL PATIENT: NORMAL ML
TRANSFERRIN SERPL-MCNC: 105 MG/DL (ref 200–375)
TRANSFERRIN SERPL-MCNC: 154 MG/DL (ref 200–375)
TRANSFERRIN SERPL-MCNC: 89 MG/DL (ref 200–375)
TRANSFERRIN SERPL-MCNC: 92 MG/DL (ref 200–375)
TRICUSPID ANNULAR PLANE SYSTOLIC EXCURSION: 1.56 CM
TRIGL SERPL-MCNC: 99 MG/DL (ref 30–150)
TROPONIN I SERPL DL<=0.01 NG/ML-MCNC: 0.02 NG/ML (ref 0–0.03)
TROPONIN I SERPL DL<=0.01 NG/ML-MCNC: 0.03 NG/ML (ref 0–0.03)
TROPONIN I SERPL DL<=0.01 NG/ML-MCNC: 0.04 NG/ML (ref 0–0.03)
TROPONIN I SERPL DL<=0.01 NG/ML-MCNC: 0.07 NG/ML (ref 0–0.03)
TSH SERPL DL<=0.005 MIU/L-ACNC: 15.09 UIU/ML (ref 0.4–4)
TSH SERPL DL<=0.005 MIU/L-ACNC: 8.04 UIU/ML (ref 0.4–4)
TV REST PULMONARY ARTERY PRESSURE: 36 MMHG
URATE SERPL-MCNC: 3.2 MG/DL (ref 3.4–7)
URATE SERPL-MCNC: 4.7 MG/DL (ref 3.4–7)
URATE SERPL-MCNC: 5.8 MG/DL (ref 3.4–7)
URATE SERPL-MCNC: 5.9 MG/DL (ref 3.4–7)
URATE SERPL-MCNC: 6.2 MG/DL (ref 3.4–7)
URN SPEC COLLECT METH UR: ABNORMAL
UROBILINOGEN UR STRIP-ACNC: NEGATIVE EU/DL
VANCOMYCIN SERPL-MCNC: 11 UG/ML
VANCOMYCIN SERPL-MCNC: 12.9 UG/ML
VANCOMYCIN SERPL-MCNC: 13.6 UG/ML
VANCOMYCIN SERPL-MCNC: 15.3 UG/ML
VANCOMYCIN SERPL-MCNC: 18.1 UG/ML
VANCOMYCIN SERPL-MCNC: 18.1 UG/ML
VANCOMYCIN SERPL-MCNC: 19.1 UG/ML
VANCOMYCIN SERPL-MCNC: 19.1 UG/ML
VANCOMYCIN SERPL-MCNC: 19.2 UG/ML
VANCOMYCIN SERPL-MCNC: 19.6 UG/ML
VANCOMYCIN SERPL-MCNC: 21 UG/ML
VANCOMYCIN SERPL-MCNC: 23.3 UG/ML
VANCOMYCIN SERPL-MCNC: 7 UG/ML
VANCOMYCIN SERPL-MCNC: 7.5 UG/ML
VIT B12 SERPL-MCNC: 1586 PG/ML (ref 210–950)
VT: 550
WBC # BLD AUTO: 0.11 K/UL (ref 3.9–12.7)
WBC # BLD AUTO: 0.2 K/UL (ref 3.9–12.7)
WBC # BLD AUTO: 0.22 K/UL (ref 3.9–12.7)
WBC # BLD AUTO: 1.43 K/UL (ref 3.9–12.7)
WBC # BLD AUTO: 1.65 K/UL (ref 3.9–12.7)
WBC # BLD AUTO: 11.71 K/UL (ref 3.9–12.7)
WBC # BLD AUTO: 12.13 K/UL (ref 3.9–12.7)
WBC # BLD AUTO: 12.82 K/UL (ref 3.9–12.7)
WBC # BLD AUTO: 13.06 K/UL (ref 3.9–12.7)
WBC # BLD AUTO: 13.06 K/UL (ref 3.9–12.7)
WBC # BLD AUTO: 14.81 K/UL (ref 3.9–12.7)
WBC # BLD AUTO: 16.98 K/UL (ref 3.9–12.7)
WBC # BLD AUTO: 17.83 K/UL (ref 3.9–12.7)
WBC # BLD AUTO: 21.08 K/UL (ref 3.9–12.7)
WBC # BLD AUTO: 21.7 K/UL (ref 3.9–12.7)
WBC # BLD AUTO: 21.9 K/UL (ref 3.9–12.7)
WBC # BLD AUTO: 22.7 K/UL (ref 3.9–12.7)
WBC # BLD AUTO: 24.99 K/UL (ref 3.9–12.7)
WBC # BLD AUTO: 25.82 K/UL (ref 3.9–12.7)
WBC # BLD AUTO: 3.86 K/UL (ref 3.9–12.7)
WBC # BLD AUTO: 3.99 K/UL (ref 3.9–12.7)
WBC # BLD AUTO: 31.22 K/UL (ref 3.9–12.7)
WBC # BLD AUTO: 37.09 K/UL (ref 3.9–12.7)
WBC # BLD AUTO: 37.46 K/UL (ref 3.9–12.7)
WBC # BLD AUTO: 4 K/UL (ref 3.9–12.7)
WBC # BLD AUTO: 4.09 K/UL (ref 3.9–12.7)
WBC # BLD AUTO: 4.3 K/UL (ref 3.9–12.7)
WBC # BLD AUTO: 4.3 K/UL (ref 3.9–12.7)
WBC # BLD AUTO: 4.51 K/UL (ref 3.9–12.7)
WBC # BLD AUTO: 4.88 K/UL (ref 3.9–12.7)
WBC # BLD AUTO: 5.09 K/UL (ref 3.9–12.7)
WBC # BLD AUTO: 5.16 K/UL (ref 3.9–12.7)
WBC # BLD AUTO: 5.17 K/UL (ref 3.9–12.7)
WBC # BLD AUTO: 5.29 K/UL (ref 3.9–12.7)
WBC # BLD AUTO: 5.42 K/UL (ref 3.9–12.7)
WBC # BLD AUTO: 5.43 K/UL (ref 3.9–12.7)
WBC # BLD AUTO: 5.44 K/UL (ref 3.9–12.7)
WBC # BLD AUTO: 5.46 K/UL (ref 3.9–12.7)
WBC # BLD AUTO: 5.52 K/UL (ref 3.9–12.7)
WBC # BLD AUTO: 5.57 K/UL (ref 3.9–12.7)
WBC # BLD AUTO: 5.62 K/UL (ref 3.9–12.7)
WBC # BLD AUTO: 5.73 K/UL (ref 3.9–12.7)
WBC # BLD AUTO: 6.15 K/UL (ref 3.9–12.7)
WBC # BLD AUTO: 6.3 K/UL (ref 3.9–12.7)
WBC # BLD AUTO: 6.79 K/UL (ref 3.9–12.7)
WBC # BLD AUTO: 7.21 K/UL (ref 3.9–12.7)
WBC # BLD AUTO: 7.21 K/UL (ref 3.9–12.7)
WBC # BLD AUTO: 7.49 K/UL (ref 3.9–12.7)
WBC # BLD AUTO: 7.56 K/UL (ref 3.9–12.7)
WBC # BLD AUTO: 7.57 K/UL (ref 3.9–12.7)
WBC # BLD AUTO: 7.66 K/UL (ref 3.9–12.7)
WBC # BLD AUTO: 7.84 K/UL (ref 3.9–12.7)
WBC # BLD AUTO: 7.92 K/UL (ref 3.9–12.7)
WBC # BLD AUTO: 8.03 K/UL (ref 3.9–12.7)
WBC # BLD AUTO: 8.69 K/UL (ref 3.9–12.7)
WBC # BLD AUTO: 8.85 K/UL (ref 3.9–12.7)
WBC # BLD AUTO: 8.96 K/UL (ref 3.9–12.7)
WBC # BLD AUTO: 9.4 K/UL (ref 3.9–12.7)
WBC # BLD AUTO: 9.54 K/UL (ref 3.9–12.7)
WBC #/AREA URNS AUTO: 73 /HPF (ref 0–5)
WBC #/AREA URNS AUTO: 84 /HPF (ref 0–5)
WBC #/AREA URNS AUTO: >100 /HPF (ref 0–5)
WBC #/AREA URNS AUTO: >100 /HPF (ref 0–5)
WBC #/AREA URNS HPF: 2 /HPF (ref 0–5)
WBC #/AREA URNS HPF: 3 /HPF (ref 0–5)
WBC #/AREA URNS HPF: 4 /HPF (ref 0–5)
WBC #/AREA URNS HPF: 50 /HPF (ref 0–5)
WBC CLUMPS UR QL AUTO: ABNORMAL
WBC OTHER NFR BLD MANUAL: 3 %

## 2020-01-01 PROCEDURE — 99232 SBSQ HOSP IP/OBS MODERATE 35: CPT | Mod: ,,, | Performed by: PHYSICIAN ASSISTANT

## 2020-01-01 PROCEDURE — 27201012 HC FORCEPS, HOT/COLD, DISP: Performed by: INTERNAL MEDICINE

## 2020-01-01 PROCEDURE — 87502 INFLUENZA DNA AMP PROBE: CPT

## 2020-01-01 PROCEDURE — 36000708 HC OR TIME LEV III 1ST 15 MIN: Performed by: SURGERY

## 2020-01-01 PROCEDURE — 25000003 PHARM REV CODE 250: Performed by: STUDENT IN AN ORGANIZED HEALTH CARE EDUCATION/TRAINING PROGRAM

## 2020-01-01 PROCEDURE — 36415 COLL VENOUS BLD VENIPUNCTURE: CPT

## 2020-01-01 PROCEDURE — 99232 PR SUBSEQUENT HOSPITAL CARE,LEVL II: ICD-10-PCS | Mod: GC,,, | Performed by: HOSPITALIST

## 2020-01-01 PROCEDURE — 25000003 PHARM REV CODE 250: Performed by: INTERNAL MEDICINE

## 2020-01-01 PROCEDURE — 25000003 PHARM REV CODE 250: Performed by: SURGERY

## 2020-01-01 PROCEDURE — 1159F PR MEDICATION LIST DOCUMENTED IN MEDICAL RECORD: ICD-10-PCS | Mod: S$GLB,,, | Performed by: INTERNAL MEDICINE

## 2020-01-01 PROCEDURE — 82330 ASSAY OF CALCIUM: CPT | Mod: 91

## 2020-01-01 PROCEDURE — 71000044 HC DOSC ROUTINE RECOVERY FIRST HOUR: Performed by: SURGERY

## 2020-01-01 PROCEDURE — 96375 TX/PRO/DX INJ NEW DRUG ADDON: CPT

## 2020-01-01 PROCEDURE — 36600 WITHDRAWAL OF ARTERIAL BLOOD: CPT

## 2020-01-01 PROCEDURE — D9220A PRA ANESTHESIA: Mod: CRNA,,, | Performed by: NURSE ANESTHETIST, CERTIFIED REGISTERED

## 2020-01-01 PROCEDURE — 85027 COMPLETE CBC AUTOMATED: CPT

## 2020-01-01 PROCEDURE — 85007 BL SMEAR W/DIFF WBC COUNT: CPT

## 2020-01-01 PROCEDURE — 77001 CHG FLUOROGUIDE CNTRL VEN ACCESS,PLACE,REPLACE,REMOVE: ICD-10-PCS | Mod: 26,,, | Performed by: SURGERY

## 2020-01-01 PROCEDURE — 63600175 PHARM REV CODE 636 W HCPCS: Performed by: INTERNAL MEDICINE

## 2020-01-01 PROCEDURE — 97530 THERAPEUTIC ACTIVITIES: CPT

## 2020-01-01 PROCEDURE — 25000242 PHARM REV CODE 250 ALT 637 W/ HCPCS: Performed by: SURGERY

## 2020-01-01 PROCEDURE — 80202 ASSAY OF VANCOMYCIN: CPT

## 2020-01-01 PROCEDURE — 36561 PR INSERT TUNNELED CV CATH WITH PORT: ICD-10-PCS | Mod: LT,,, | Performed by: SURGERY

## 2020-01-01 PROCEDURE — 81000 URINALYSIS NONAUTO W/SCOPE: CPT

## 2020-01-01 PROCEDURE — 99999 PR PBB SHADOW E&M-EST. PATIENT-LVL I: ICD-10-PCS | Mod: PBBFAC,,,

## 2020-01-01 PROCEDURE — D9220A PRA ANESTHESIA: Mod: ANES,,, | Performed by: ANESTHESIOLOGY

## 2020-01-01 PROCEDURE — 88342 IMHCHEM/IMCYTCHM 1ST ANTB: CPT | Mod: 59 | Performed by: PATHOLOGY

## 2020-01-01 PROCEDURE — 99233 PR SUBSEQUENT HOSPITAL CARE,LEVL III: ICD-10-PCS | Mod: S$GLB,,, | Performed by: INTERNAL MEDICINE

## 2020-01-01 PROCEDURE — 99232 PR SUBSEQUENT HOSPITAL CARE,LEVL II: ICD-10-PCS | Mod: ,,, | Performed by: PHYSICIAN ASSISTANT

## 2020-01-01 PROCEDURE — 84300 ASSAY OF URINE SODIUM: CPT

## 2020-01-01 PROCEDURE — 99999 PR PBB SHADOW E&M-EST. PATIENT-LVL IV: CPT | Mod: PBBFAC,,, | Performed by: NURSE PRACTITIONER

## 2020-01-01 PROCEDURE — 83605 ASSAY OF LACTIC ACID: CPT

## 2020-01-01 PROCEDURE — 30200315 PPD INTRADERMAL TEST REV CODE 302: Performed by: INTERNAL MEDICINE

## 2020-01-01 PROCEDURE — S0030 INJECTION, METRONIDAZOLE: HCPCS | Performed by: INTERNAL MEDICINE

## 2020-01-01 PROCEDURE — 99232 PR SUBSEQUENT HOSPITAL CARE,LEVL II: ICD-10-PCS | Mod: S$GLB,,, | Performed by: INTERNAL MEDICINE

## 2020-01-01 PROCEDURE — 25500020 PHARM REV CODE 255

## 2020-01-01 PROCEDURE — 87077 CULTURE AEROBIC IDENTIFY: CPT

## 2020-01-01 PROCEDURE — 85027 COMPLETE CBC AUTOMATED: CPT | Mod: 91

## 2020-01-01 PROCEDURE — 99215 PR OFFICE/OUTPT VISIT, EST, LEVL V, 40-54 MIN: ICD-10-PCS | Mod: S$GLB,,, | Performed by: INTERNAL MEDICINE

## 2020-01-01 PROCEDURE — 3075F SYST BP GE 130 - 139MM HG: CPT | Mod: S$GLB,,, | Performed by: INTERNAL MEDICINE

## 2020-01-01 PROCEDURE — 84550 ASSAY OF BLOOD/URIC ACID: CPT

## 2020-01-01 PROCEDURE — 3075F PR MOST RECENT SYSTOLIC BLOOD PRESS GE 130-139MM HG: ICD-10-PCS | Mod: S$GLB,,, | Performed by: UROLOGY

## 2020-01-01 PROCEDURE — 99900035 HC TECH TIME PER 15 MIN (STAT)

## 2020-01-01 PROCEDURE — 99999 PR PBB SHADOW E&M-EST. PATIENT-LVL I: CPT | Mod: PBBFAC,,,

## 2020-01-01 PROCEDURE — 84100 ASSAY OF PHOSPHORUS: CPT

## 2020-01-01 PROCEDURE — 87086 URINE CULTURE/COLONY COUNT: CPT

## 2020-01-01 PROCEDURE — 25000003 PHARM REV CODE 250: Performed by: EMERGENCY MEDICINE

## 2020-01-01 PROCEDURE — 88342 CHG IMMUNOCYTOCHEMISTRY: ICD-10-PCS | Mod: 26,,, | Performed by: PATHOLOGY

## 2020-01-01 PROCEDURE — 88341 PR IHC OR ICC EACH ADD'L SINGLE ANTIBODY  STAINPR: ICD-10-PCS | Mod: 26,,, | Performed by: PATHOLOGY

## 2020-01-01 PROCEDURE — 25000003 PHARM REV CODE 250: Performed by: HOSPITALIST

## 2020-01-01 PROCEDURE — 20000000 HC ICU ROOM

## 2020-01-01 PROCEDURE — 99999 PR PBB SHADOW E&M-EST. PATIENT-LVL IV: CPT | Mod: PBBFAC,,, | Performed by: INTERNAL MEDICINE

## 2020-01-01 PROCEDURE — 92004 PR EYE EXAM, NEW PATIENT,COMPREHESV: ICD-10-PCS | Mod: S$GLB,,, | Performed by: OPHTHALMOLOGY

## 2020-01-01 PROCEDURE — 99214 PR OFFICE/OUTPT VISIT, EST, LEVL IV, 30-39 MIN: ICD-10-PCS | Mod: GC,S$GLB,, | Performed by: INTERNAL MEDICINE

## 2020-01-01 PROCEDURE — 80048 BASIC METABOLIC PNL TOTAL CA: CPT

## 2020-01-01 PROCEDURE — 3079F PR MOST RECENT DIASTOLIC BLOOD PRESSURE 80-89 MM HG: ICD-10-PCS | Mod: S$GLB,,, | Performed by: NURSE PRACTITIONER

## 2020-01-01 PROCEDURE — 93010 EKG 12-LEAD: ICD-10-PCS | Mod: ,,, | Performed by: INTERNAL MEDICINE

## 2020-01-01 PROCEDURE — 84132 ASSAY OF SERUM POTASSIUM: CPT

## 2020-01-01 PROCEDURE — 3079F PR MOST RECENT DIASTOLIC BLOOD PRESSURE 80-89 MM HG: ICD-10-PCS | Mod: S$GLB,,, | Performed by: INTERNAL MEDICINE

## 2020-01-01 PROCEDURE — 99999 PR PBB SHADOW E&M-EST. PATIENT-LVL IV: ICD-10-PCS | Mod: PBBFAC,,, | Performed by: NURSE PRACTITIONER

## 2020-01-01 PROCEDURE — S0028 INJECTION, FAMOTIDINE, 20 MG: HCPCS | Performed by: INTERNAL MEDICINE

## 2020-01-01 PROCEDURE — 87088 URINE BACTERIA CULTURE: CPT

## 2020-01-01 PROCEDURE — 36430 TRANSFUSION BLD/BLD COMPNT: CPT

## 2020-01-01 PROCEDURE — 36000706: Performed by: STUDENT IN AN ORGANIZED HEALTH CARE EDUCATION/TRAINING PROGRAM

## 2020-01-01 PROCEDURE — 85025 COMPLETE CBC W/AUTO DIFF WBC: CPT

## 2020-01-01 PROCEDURE — 88305 TISSUE EXAM BY PATHOLOGIST: CPT | Mod: 26,,, | Performed by: PATHOLOGY

## 2020-01-01 PROCEDURE — 3078F PR MOST RECENT DIASTOLIC BLOOD PRESSURE < 80 MM HG: ICD-10-PCS | Mod: S$GLB,,, | Performed by: INTERNAL MEDICINE

## 2020-01-01 PROCEDURE — 87075 CULTR BACTERIA EXCEPT BLOOD: CPT

## 2020-01-01 PROCEDURE — 1101F PT FALLS ASSESS-DOCD LE1/YR: CPT | Mod: S$GLB,,, | Performed by: INTERNAL MEDICINE

## 2020-01-01 PROCEDURE — 12001 RPR S/N/AX/GEN/TRNK 2.5CM/<: CPT

## 2020-01-01 PROCEDURE — 63600175 PHARM REV CODE 636 W HCPCS: Performed by: SURGERY

## 2020-01-01 PROCEDURE — 94761 N-INVAS EAR/PLS OXIMETRY MLT: CPT

## 2020-01-01 PROCEDURE — 3008F PR BODY MASS INDEX (BMI) DOCUMENTED: ICD-10-PCS | Mod: S$GLB,,, | Performed by: NURSE PRACTITIONER

## 2020-01-01 PROCEDURE — 87186 SC STD MICRODIL/AGAR DIL: CPT | Mod: 59

## 2020-01-01 PROCEDURE — 82962 GLUCOSE BLOOD TEST: CPT

## 2020-01-01 PROCEDURE — 37799 UNLISTED PX VASCULAR SURGERY: CPT

## 2020-01-01 PROCEDURE — 88342 IMHCHEM/IMCYTCHM 1ST ANTB: CPT | Mod: 26,59,, | Performed by: PATHOLOGY

## 2020-01-01 PROCEDURE — 99285 EMERGENCY DEPT VISIT HI MDM: CPT | Mod: ,,, | Performed by: PHYSICIAN ASSISTANT

## 2020-01-01 PROCEDURE — 99233 SBSQ HOSP IP/OBS HIGH 50: CPT | Mod: ,,, | Performed by: INTERNAL MEDICINE

## 2020-01-01 PROCEDURE — 80197 ASSAY OF TACROLIMUS: CPT

## 2020-01-01 PROCEDURE — S0030 INJECTION, METRONIDAZOLE: HCPCS | Performed by: SURGERY

## 2020-01-01 PROCEDURE — 96377 APPLICATON ON-BODY INJECTOR: CPT

## 2020-01-01 PROCEDURE — 99223 1ST HOSP IP/OBS HIGH 75: CPT | Mod: ,,, | Performed by: INTERNAL MEDICINE

## 2020-01-01 PROCEDURE — 99231 SBSQ HOSP IP/OBS SF/LOW 25: CPT | Mod: S$GLB,,, | Performed by: INTERNAL MEDICINE

## 2020-01-01 PROCEDURE — 88189 PR  FLOWCYTOMETRY/READ, 16 & > MARKERS: ICD-10-PCS | Mod: ,,, | Performed by: PATHOLOGY

## 2020-01-01 PROCEDURE — 25000003 PHARM REV CODE 250: Performed by: RADIOLOGY

## 2020-01-01 PROCEDURE — 99233 PR SUBSEQUENT HOSPITAL CARE,LEVL III: ICD-10-PCS | Mod: ,,, | Performed by: INTERNAL MEDICINE

## 2020-01-01 PROCEDURE — 43239 EGD BIOPSY SINGLE/MULTIPLE: CPT | Mod: 51,,, | Performed by: INTERNAL MEDICINE

## 2020-01-01 PROCEDURE — 85610 PROTHROMBIN TIME: CPT

## 2020-01-01 PROCEDURE — 88341 IMHCHEM/IMCYTCHM EA ADD ANTB: CPT | Performed by: PATHOLOGY

## 2020-01-01 PROCEDURE — 3074F PR MOST RECENT SYSTOLIC BLOOD PRESSURE < 130 MM HG: ICD-10-PCS | Mod: S$GLB,,, | Performed by: NURSE PRACTITIONER

## 2020-01-01 PROCEDURE — P9016 RBC LEUKOCYTES REDUCED: HCPCS

## 2020-01-01 PROCEDURE — 96411 CHEMO IV PUSH ADDL DRUG: CPT

## 2020-01-01 PROCEDURE — 88305 TISSUE EXAM BY PATHOLOGIST: CPT | Performed by: PATHOLOGY

## 2020-01-01 PROCEDURE — 44120 PR RESECT SMALL INTEST,SINGL RESEC/ANAS: ICD-10-PCS | Mod: 58,,, | Performed by: SURGERY

## 2020-01-01 PROCEDURE — 99233 SBSQ HOSP IP/OBS HIGH 50: CPT | Mod: S$GLB,,, | Performed by: INTERNAL MEDICINE

## 2020-01-01 PROCEDURE — 82272 OCCULT BLD FECES 1-3 TESTS: CPT

## 2020-01-01 PROCEDURE — 1126F AMNT PAIN NOTED NONE PRSNT: CPT | Mod: S$GLB,,, | Performed by: INTERNAL MEDICINE

## 2020-01-01 PROCEDURE — 87186 SC STD MICRODIL/AGAR DIL: CPT

## 2020-01-01 PROCEDURE — 99232 PR SUBSEQUENT HOSPITAL CARE,LEVL II: ICD-10-PCS | Mod: ,,, | Performed by: INTERNAL MEDICINE

## 2020-01-01 PROCEDURE — 1159F MED LIST DOCD IN RCRD: CPT | Mod: S$GLB,,, | Performed by: NURSE PRACTITIONER

## 2020-01-01 PROCEDURE — 82330 ASSAY OF CALCIUM: CPT

## 2020-01-01 PROCEDURE — 63600175 PHARM REV CODE 636 W HCPCS: Performed by: STUDENT IN AN ORGANIZED HEALTH CARE EDUCATION/TRAINING PROGRAM

## 2020-01-01 PROCEDURE — 99223 PR INITIAL HOSPITAL CARE,LEVL III: ICD-10-PCS | Mod: ,,, | Performed by: PHYSICIAN ASSISTANT

## 2020-01-01 PROCEDURE — 44155 PR REMOVAL COLON/PROCTECTOMY/ILEOSTOMY: ICD-10-PCS | Mod: 22,,, | Performed by: SURGERY

## 2020-01-01 PROCEDURE — 37000009 HC ANESTHESIA EA ADD 15 MINS: Performed by: STUDENT IN AN ORGANIZED HEALTH CARE EDUCATION/TRAINING PROGRAM

## 2020-01-01 PROCEDURE — 99900026 HC AIRWAY MAINTENANCE (STAT)

## 2020-01-01 PROCEDURE — 88365 PR  TISSUE HYBRIDIZATION: ICD-10-PCS | Mod: 26,,, | Performed by: PATHOLOGY

## 2020-01-01 PROCEDURE — 12000002 HC ACUTE/MED SURGE SEMI-PRIVATE ROOM

## 2020-01-01 PROCEDURE — 82728 ASSAY OF FERRITIN: CPT

## 2020-01-01 PROCEDURE — U0002 COVID-19 LAB TEST NON-CDC: HCPCS

## 2020-01-01 PROCEDURE — 86850 RBC ANTIBODY SCREEN: CPT

## 2020-01-01 PROCEDURE — 36415 COLL VENOUS BLD VENIPUNCTURE: CPT | Mod: PO

## 2020-01-01 PROCEDURE — 97161 PT EVAL LOW COMPLEX 20 MIN: CPT

## 2020-01-01 PROCEDURE — 80053 COMPREHEN METABOLIC PANEL: CPT

## 2020-01-01 PROCEDURE — 96367 TX/PROPH/DG ADDL SEQ IV INF: CPT

## 2020-01-01 PROCEDURE — 94002 VENT MGMT INPAT INIT DAY: CPT

## 2020-01-01 PROCEDURE — 1159F PR MEDICATION LIST DOCUMENTED IN MEDICAL RECORD: ICD-10-PCS | Mod: ,,, | Performed by: UROLOGY

## 2020-01-01 PROCEDURE — 99232 SBSQ HOSP IP/OBS MODERATE 35: CPT | Mod: ,,, | Performed by: INTERNAL MEDICINE

## 2020-01-01 PROCEDURE — 86870 RBC ANTIBODY IDENTIFICATION: CPT

## 2020-01-01 PROCEDURE — 81001 URINALYSIS AUTO W/SCOPE: CPT

## 2020-01-01 PROCEDURE — 93010 ELECTROCARDIOGRAM REPORT: CPT | Mod: ,,, | Performed by: INTERNAL MEDICINE

## 2020-01-01 PROCEDURE — 3288F PR FALLS RISK ASSESSMENT DOCUMENTED: ICD-10-PCS | Mod: S$GLB,,, | Performed by: NURSE PRACTITIONER

## 2020-01-01 PROCEDURE — 88189 FLOWCYTOMETRY/READ 16 & >: CPT | Mod: ,,, | Performed by: PATHOLOGY

## 2020-01-01 PROCEDURE — 99232 SBSQ HOSP IP/OBS MODERATE 35: CPT | Mod: GC,,, | Performed by: HOSPITALIST

## 2020-01-01 PROCEDURE — 86706 HEP B SURFACE ANTIBODY: CPT

## 2020-01-01 PROCEDURE — 88365 INSITU HYBRIDIZATION (FISH): CPT | Mod: 26,,, | Performed by: PATHOLOGY

## 2020-01-01 PROCEDURE — 99204 OFFICE O/P NEW MOD 45 MIN: CPT | Mod: S$GLB,,, | Performed by: NURSE PRACTITIONER

## 2020-01-01 PROCEDURE — 85097 BONE MARROW INTERPRETATION: CPT | Mod: ,,, | Performed by: PATHOLOGY

## 2020-01-01 PROCEDURE — 83880 ASSAY OF NATRIURETIC PEPTIDE: CPT

## 2020-01-01 PROCEDURE — 76937 US GUIDE VASCULAR ACCESS: CPT

## 2020-01-01 PROCEDURE — 3008F PR BODY MASS INDEX (BMI) DOCUMENTED: ICD-10-PCS | Mod: S$GLB,,, | Performed by: INTERNAL MEDICINE

## 2020-01-01 PROCEDURE — 87040 BLOOD CULTURE FOR BACTERIA: CPT

## 2020-01-01 PROCEDURE — 1100F PR PT FALLS ASSESS DOC 2+ FALLS/FALL W/INJURY/YR: ICD-10-PCS | Mod: S$GLB,,, | Performed by: NURSE PRACTITIONER

## 2020-01-01 PROCEDURE — 80048 BASIC METABOLIC PNL TOTAL CA: CPT | Mod: 91

## 2020-01-01 PROCEDURE — 99223 PR INITIAL HOSPITAL CARE,LEVL III: ICD-10-PCS | Mod: ,,, | Performed by: INTERNAL MEDICINE

## 2020-01-01 PROCEDURE — 63600175 PHARM REV CODE 636 W HCPCS: Performed by: EMERGENCY MEDICINE

## 2020-01-01 PROCEDURE — 25000003 PHARM REV CODE 250: Performed by: PHYSICIAN ASSISTANT

## 2020-01-01 PROCEDURE — 88333 PATH CONSLTJ SURG CYTO XM 1: CPT | Mod: 26,,, | Performed by: PATHOLOGY

## 2020-01-01 PROCEDURE — 88377 M/PHMTRC ALYS ISHQUANT/SEMIQ: CPT | Performed by: PATHOLOGY

## 2020-01-01 PROCEDURE — 3288F FALL RISK ASSESSMENT DOCD: CPT | Mod: S$GLB,,, | Performed by: NURSE PRACTITIONER

## 2020-01-01 PROCEDURE — 3074F PR MOST RECENT SYSTOLIC BLOOD PRESSURE < 130 MM HG: ICD-10-PCS | Mod: S$GLB,,, | Performed by: INTERNAL MEDICINE

## 2020-01-01 PROCEDURE — 25000003 PHARM REV CODE 250: Performed by: NURSE ANESTHETIST, CERTIFIED REGISTERED

## 2020-01-01 PROCEDURE — 78815 PET IMAGE W/CT SKULL-THIGH: CPT | Mod: TC

## 2020-01-01 PROCEDURE — 99223 1ST HOSP IP/OBS HIGH 75: CPT | Mod: GC,,, | Performed by: INTERNAL MEDICINE

## 2020-01-01 PROCEDURE — 63600175 PHARM REV CODE 636 W HCPCS: Performed by: HOSPITALIST

## 2020-01-01 PROCEDURE — U0003 INFECTIOUS AGENT DETECTION BY NUCLEIC ACID (DNA OR RNA); SEVERE ACUTE RESPIRATORY SYNDROME CORONAVIRUS 2 (SARS-COV-2) (CORONAVIRUS DISEASE [COVID-19]), AMPLIFIED PROBE TECHNIQUE, MAKING USE OF HIGH THROUGHPUT TECHNOLOGIES AS DESCRIBED BY CMS-2020-01-R: HCPCS

## 2020-01-01 PROCEDURE — 88185 FLOWCYTOMETRY/TC ADD-ON: CPT | Mod: 59 | Performed by: PATHOLOGY

## 2020-01-01 PROCEDURE — 82746 ASSAY OF FOLIC ACID SERUM: CPT

## 2020-01-01 PROCEDURE — 88312 SPECIAL STAINS GROUP 1: CPT | Mod: 59 | Performed by: PATHOLOGY

## 2020-01-01 PROCEDURE — 84145 PROCALCITONIN (PCT): CPT

## 2020-01-01 PROCEDURE — 97165 OT EVAL LOW COMPLEX 30 MIN: CPT

## 2020-01-01 PROCEDURE — 99284 EMERGENCY DEPT VISIT MOD MDM: CPT | Mod: ,,, | Performed by: EMERGENCY MEDICINE

## 2020-01-01 PROCEDURE — 88342 IMHCHEM/IMCYTCHM 1ST ANTB: CPT | Mod: 26,,, | Performed by: PATHOLOGY

## 2020-01-01 PROCEDURE — 63600175 PHARM REV CODE 636 W HCPCS: Performed by: NURSE ANESTHETIST, CERTIFIED REGISTERED

## 2020-01-01 PROCEDURE — 96413 CHEMO IV INFUSION 1 HR: CPT

## 2020-01-01 PROCEDURE — 88304 PR  SURG PATH,LEVEL III: ICD-10-PCS | Mod: 26,,, | Performed by: PATHOLOGY

## 2020-01-01 PROCEDURE — 94760 N-INVAS EAR/PLS OXIMETRY 1: CPT

## 2020-01-01 PROCEDURE — 93005 ELECTROCARDIOGRAM TRACING: CPT

## 2020-01-01 PROCEDURE — 87340 HEPATITIS B SURFACE AG IA: CPT

## 2020-01-01 PROCEDURE — 87651 STREP A DNA AMP PROBE: CPT

## 2020-01-01 PROCEDURE — 38222 PR BONE MARROW BIOPSY(IES) W/ASPIRATION(S); DIAGNOSTIC: ICD-10-PCS | Mod: LT,S$GLB,, | Performed by: NURSE PRACTITIONER

## 2020-01-01 PROCEDURE — 87324 CLOSTRIDIUM AG IA: CPT

## 2020-01-01 PROCEDURE — 3078F DIAST BP <80 MM HG: CPT | Mod: S$GLB,,, | Performed by: INTERNAL MEDICINE

## 2020-01-01 PROCEDURE — 1126F PR PAIN SEVERITY QUANTIFIED, NO PAIN PRESENT: ICD-10-PCS | Mod: S$GLB,,, | Performed by: INTERNAL MEDICINE

## 2020-01-01 PROCEDURE — 3075F PR MOST RECENT SYSTOLIC BLOOD PRESS GE 130-139MM HG: ICD-10-PCS | Mod: S$GLB,,, | Performed by: INTERNAL MEDICINE

## 2020-01-01 PROCEDURE — 36561 INSERT TUNNELED CV CATH: CPT | Mod: LT,,, | Performed by: SURGERY

## 2020-01-01 PROCEDURE — 45380 COLONOSCOPY AND BIOPSY: CPT | Mod: ,,, | Performed by: INTERNAL MEDICINE

## 2020-01-01 PROCEDURE — 87427 SHIGA-LIKE TOXIN AG IA: CPT | Mod: 59

## 2020-01-01 PROCEDURE — 93306 TTE W/DOPPLER COMPLETE: CPT | Mod: S$GLB,,, | Performed by: INTERNAL MEDICINE

## 2020-01-01 PROCEDURE — 37000008 HC ANESTHESIA 1ST 15 MINUTES: Performed by: STUDENT IN AN ORGANIZED HEALTH CARE EDUCATION/TRAINING PROGRAM

## 2020-01-01 PROCEDURE — 1101F PR PT FALLS ASSESS DOC 0-1 FALLS W/OUT INJ PAST YR: ICD-10-PCS | Mod: S$GLB,,, | Performed by: INTERNAL MEDICINE

## 2020-01-01 PROCEDURE — 3008F BODY MASS INDEX DOCD: CPT | Mod: S$GLB,,, | Performed by: INTERNAL MEDICINE

## 2020-01-01 PROCEDURE — 11000001 HC ACUTE MED/SURG PRIVATE ROOM

## 2020-01-01 PROCEDURE — 99999 PR PBB SHADOW E&M-EST. PATIENT-LVL V: ICD-10-PCS | Mod: PBBFAC,,, | Performed by: INTERNAL MEDICINE

## 2020-01-01 PROCEDURE — 88365 INSITU HYBRIDIZATION (FISH): CPT | Mod: 59 | Performed by: PATHOLOGY

## 2020-01-01 PROCEDURE — 3008F BODY MASS INDEX DOCD: CPT | Mod: S$GLB,,, | Performed by: NURSE PRACTITIONER

## 2020-01-01 PROCEDURE — 78815 PET IMAGE W/CT SKULL-THIGH: CPT | Mod: 26,PI,, | Performed by: RADIOLOGY

## 2020-01-01 PROCEDURE — 80100014 HC HEMODIALYSIS 1:1

## 2020-01-01 PROCEDURE — S5010 5% DEXTROSE AND 0.45% SALINE: HCPCS | Performed by: HOSPITALIST

## 2020-01-01 PROCEDURE — 97164 PT RE-EVAL EST PLAN CARE: CPT

## 2020-01-01 PROCEDURE — 63600175 PHARM REV CODE 636 W HCPCS: Performed by: PHYSICIAN ASSISTANT

## 2020-01-01 PROCEDURE — 83735 ASSAY OF MAGNESIUM: CPT

## 2020-01-01 PROCEDURE — 99215 PR OFFICE/OUTPT VISIT, EST, LEVL V, 40-54 MIN: ICD-10-PCS | Mod: S$GLB,,, | Performed by: NURSE PRACTITIONER

## 2020-01-01 PROCEDURE — 88313 SPECIAL STAINS GROUP 2: CPT | Mod: 26,,, | Performed by: PATHOLOGY

## 2020-01-01 PROCEDURE — 3079F DIAST BP 80-89 MM HG: CPT | Mod: S$GLB,,, | Performed by: INTERNAL MEDICINE

## 2020-01-01 PROCEDURE — 85060 PATHOLOGIST REVIEW: ICD-10-PCS | Mod: ,,, | Performed by: PATHOLOGY

## 2020-01-01 PROCEDURE — C9113 INJ PANTOPRAZOLE SODIUM, VIA: HCPCS | Performed by: INTERNAL MEDICINE

## 2020-01-01 PROCEDURE — 84484 ASSAY OF TROPONIN QUANT: CPT | Mod: 91

## 2020-01-01 PROCEDURE — 88264 CHROMOSOME ANALYSIS 20-25: CPT

## 2020-01-01 PROCEDURE — 3074F SYST BP LT 130 MM HG: CPT | Mod: S$GLB,,, | Performed by: INTERNAL MEDICINE

## 2020-01-01 PROCEDURE — 47600 PR REMOVAL GALLBLADDER: ICD-10-PCS | Mod: 51,,, | Performed by: SURGERY

## 2020-01-01 PROCEDURE — 99214 OFFICE O/P EST MOD 30 MIN: CPT | Mod: S$GLB,,, | Performed by: UROLOGY

## 2020-01-01 PROCEDURE — 88360 TUMOR IMMUNOHISTOCHEM/MANUAL: CPT | Mod: 26,,, | Performed by: PATHOLOGY

## 2020-01-01 PROCEDURE — 84295 ASSAY OF SERUM SODIUM: CPT

## 2020-01-01 PROCEDURE — 97803 MED NUTRITION INDIV SUBSEQ: CPT

## 2020-01-01 PROCEDURE — 97535 SELF CARE MNGMENT TRAINING: CPT

## 2020-01-01 PROCEDURE — 1101F PR PT FALLS ASSESS DOC 0-1 FALLS W/OUT INJ PAST YR: ICD-10-PCS | Mod: S$GLB,,, | Performed by: UROLOGY

## 2020-01-01 PROCEDURE — 1100F PTFALLS ASSESS-DOCD GE2>/YR: CPT | Mod: S$GLB,,, | Performed by: NURSE PRACTITIONER

## 2020-01-01 PROCEDURE — 27000221 HC OXYGEN, UP TO 24 HOURS

## 2020-01-01 PROCEDURE — 99231 PR SUBSEQUENT HOSPITAL CARE,LEVL I: ICD-10-PCS | Mod: S$GLB,,, | Performed by: INTERNAL MEDICINE

## 2020-01-01 PROCEDURE — 1159F MED LIST DOCD IN RCRD: CPT | Mod: ,,, | Performed by: UROLOGY

## 2020-01-01 PROCEDURE — 45380 PR COLONOSCOPY,BIOPSY: ICD-10-PCS | Mod: ,,, | Performed by: INTERNAL MEDICINE

## 2020-01-01 PROCEDURE — 99215 OFFICE O/P EST HI 40 MIN: CPT | Mod: S$GLB,,, | Performed by: INTERNAL MEDICINE

## 2020-01-01 PROCEDURE — G0378 HOSPITAL OBSERVATION PER HR: HCPCS

## 2020-01-01 PROCEDURE — 88311 PR  DECALCIFY TISSUE: ICD-10-PCS | Mod: 26,,, | Performed by: PATHOLOGY

## 2020-01-01 PROCEDURE — 99215 PR OFFICE/OUTPT VISIT, EST, LEVL V, 40-54 MIN: ICD-10-PCS | Mod: GC,S$GLB,, | Performed by: INTERNAL MEDICINE

## 2020-01-01 PROCEDURE — 88342 CHG IMMUNOCYTOCHEMISTRY: ICD-10-PCS | Mod: 26,59,, | Performed by: PATHOLOGY

## 2020-01-01 PROCEDURE — 82803 BLOOD GASES ANY COMBINATION: CPT

## 2020-01-01 PROCEDURE — 83615 LACTATE (LD) (LDH) ENZYME: CPT

## 2020-01-01 PROCEDURE — 96415 CHEMO IV INFUSION ADDL HR: CPT

## 2020-01-01 PROCEDURE — 99205 OFFICE O/P NEW HI 60 MIN: CPT | Mod: S$GLB,,, | Performed by: INTERNAL MEDICINE

## 2020-01-01 PROCEDURE — 87209 SMEAR COMPLEX STAIN: CPT

## 2020-01-01 PROCEDURE — 1159F PR MEDICATION LIST DOCUMENTED IN MEDICAL RECORD: ICD-10-PCS | Mod: S$GLB,,, | Performed by: UROLOGY

## 2020-01-01 PROCEDURE — 85384 FIBRINOGEN ACTIVITY: CPT

## 2020-01-01 PROCEDURE — P9035 PLATELET PHERES LEUKOREDUCED: HCPCS

## 2020-01-01 PROCEDURE — 37000009 HC ANESTHESIA EA ADD 15 MINS: Performed by: SURGERY

## 2020-01-01 PROCEDURE — 87205 SMEAR GRAM STAIN: CPT

## 2020-01-01 PROCEDURE — 52000 CYSTOURETHROSCOPY: CPT

## 2020-01-01 PROCEDURE — 87045 FECES CULTURE AEROBIC BACT: CPT

## 2020-01-01 PROCEDURE — D9220A PRA ANESTHESIA: ICD-10-PCS | Mod: ANES,,, | Performed by: ANESTHESIOLOGY

## 2020-01-01 PROCEDURE — 27201423 OPTIME MED/SURG SUP & DEVICES STERILE SUPPLY: Performed by: SURGERY

## 2020-01-01 PROCEDURE — 99205 PR OFFICE/OUTPT VISIT, NEW, LEVL V, 60-74 MIN: ICD-10-PCS | Mod: S$GLB,,, | Performed by: INTERNAL MEDICINE

## 2020-01-01 PROCEDURE — 88185 FLOWCYTOMETRY/TC ADD-ON: CPT | Performed by: PATHOLOGY

## 2020-01-01 PROCEDURE — 87633 RESP VIRUS 12-25 TARGETS: CPT

## 2020-01-01 PROCEDURE — 88307 TISSUE EXAM BY PATHOLOGIST: CPT | Mod: 26,,, | Performed by: PATHOLOGY

## 2020-01-01 PROCEDURE — 94770 HC EXHALED C02 TEST: CPT

## 2020-01-01 PROCEDURE — 88237 TISSUE CULTURE BONE MARROW: CPT

## 2020-01-01 PROCEDURE — 82436 ASSAY OF URINE CHLORIDE: CPT

## 2020-01-01 PROCEDURE — 87040 BLOOD CULTURE FOR BACTERIA: CPT | Mod: 59

## 2020-01-01 PROCEDURE — 1159F MED LIST DOCD IN RCRD: CPT | Mod: S$GLB,,, | Performed by: INTERNAL MEDICINE

## 2020-01-01 PROCEDURE — 88313 SPECIAL STAINS GROUP 2: CPT | Performed by: PATHOLOGY

## 2020-01-01 PROCEDURE — 99282 EMERGENCY DEPT VISIT SF MDM: CPT | Mod: 25

## 2020-01-01 PROCEDURE — 88184 FLOWCYTOMETRY/ TC 1 MARKER: CPT | Performed by: PATHOLOGY

## 2020-01-01 PROCEDURE — 88307 TISSUE EXAM BY PATHOLOGIST: CPT | Performed by: PATHOLOGY

## 2020-01-01 PROCEDURE — 3074F SYST BP LT 130 MM HG: CPT | Mod: S$GLB,,, | Performed by: NURSE PRACTITIONER

## 2020-01-01 PROCEDURE — 88341 IMHCHEM/IMCYTCHM EA ADD ANTB: CPT | Mod: 26,,, | Performed by: PATHOLOGY

## 2020-01-01 PROCEDURE — B4185 PARENTERAL SOL 10 GM LIPIDS: HCPCS | Performed by: HOSPITALIST

## 2020-01-01 PROCEDURE — 86704 HEP B CORE ANTIBODY TOTAL: CPT

## 2020-01-01 PROCEDURE — 88305 TISSUE EXAM BY PATHOLOGIST: ICD-10-PCS | Mod: 26,,, | Performed by: PATHOLOGY

## 2020-01-01 PROCEDURE — 84484 ASSAY OF TROPONIN QUANT: CPT

## 2020-01-01 PROCEDURE — 83540 ASSAY OF IRON: CPT

## 2020-01-01 PROCEDURE — 96374 THER/PROPH/DIAG INJ IV PUSH: CPT | Performed by: EMERGENCY MEDICINE

## 2020-01-01 PROCEDURE — 80069 RENAL FUNCTION PANEL: CPT

## 2020-01-01 PROCEDURE — 1101F PR PT FALLS ASSESS DOC 0-1 FALLS W/OUT INJ PAST YR: ICD-10-PCS | Mod: S$GLB,,, | Performed by: NURSE PRACTITIONER

## 2020-01-01 PROCEDURE — 3078F PR MOST RECENT DIASTOLIC BLOOD PRESSURE < 80 MM HG: ICD-10-PCS | Mod: S$GLB,,, | Performed by: NURSE PRACTITIONER

## 2020-01-01 PROCEDURE — 96401 CHEMO ANTI-NEOPL SQ/IM: CPT

## 2020-01-01 PROCEDURE — 83935 ASSAY OF URINE OSMOLALITY: CPT

## 2020-01-01 PROCEDURE — 1101F PT FALLS ASSESS-DOCD LE1/YR: CPT | Mod: S$GLB,,, | Performed by: NURSE PRACTITIONER

## 2020-01-01 PROCEDURE — 25000003 PHARM REV CODE 250: Performed by: FAMILY MEDICINE

## 2020-01-01 PROCEDURE — 27201423 OPTIME MED/SURG SUP & DEVICES STERILE SUPPLY: Performed by: STUDENT IN AN ORGANIZED HEALTH CARE EDUCATION/TRAINING PROGRAM

## 2020-01-01 PROCEDURE — 86922 COMPATIBILITY TEST ANTIGLOB: CPT

## 2020-01-01 PROCEDURE — 97802 MEDICAL NUTRITION INDIV IN: CPT

## 2020-01-01 PROCEDURE — 85730 THROMBOPLASTIN TIME PARTIAL: CPT

## 2020-01-01 PROCEDURE — 36000709 HC OR TIME LEV III EA ADD 15 MIN: Performed by: SURGERY

## 2020-01-01 PROCEDURE — 71000033 HC RECOVERY, INTIAL HOUR: Performed by: SURGERY

## 2020-01-01 PROCEDURE — 99220 PR INITIAL OBSERVATION CARE,LEVL III: ICD-10-PCS | Mod: GC,,, | Performed by: HOSPITALIST

## 2020-01-01 PROCEDURE — 99232 SBSQ HOSP IP/OBS MODERATE 35: CPT | Mod: S$GLB,,, | Performed by: INTERNAL MEDICINE

## 2020-01-01 PROCEDURE — 88313 PR  SPECIAL STAINS,GROUP II: ICD-10-PCS | Mod: 26,,, | Performed by: PATHOLOGY

## 2020-01-01 PROCEDURE — 88312 PR  SPECIAL STAINS,GROUP I: ICD-10-PCS | Mod: 26,,, | Performed by: PATHOLOGY

## 2020-01-01 PROCEDURE — 3079F DIAST BP 80-89 MM HG: CPT | Mod: S$GLB,,, | Performed by: NURSE PRACTITIONER

## 2020-01-01 PROCEDURE — 93306 ECHO (CUPID ONLY): ICD-10-PCS | Mod: S$GLB,,, | Performed by: INTERNAL MEDICINE

## 2020-01-01 PROCEDURE — 96360 HYDRATION IV INFUSION INIT: CPT

## 2020-01-01 PROCEDURE — B4185 PARENTERAL SOL 10 GM LIPIDS: HCPCS | Performed by: INTERNAL MEDICINE

## 2020-01-01 PROCEDURE — 97530 THERAPEUTIC ACTIVITIES: CPT | Mod: CQ

## 2020-01-01 PROCEDURE — 94640 AIRWAY INHALATION TREATMENT: CPT

## 2020-01-01 PROCEDURE — 88333 PATH CONSLTJ SURG CYTO XM 1: CPT | Performed by: PATHOLOGY

## 2020-01-01 PROCEDURE — 99214 PR OFFICE/OUTPT VISIT, EST, LEVL IV, 30-39 MIN: ICD-10-PCS | Mod: S$GLB,,, | Performed by: NURSE PRACTITIONER

## 2020-01-01 PROCEDURE — 99999 PR PBB SHADOW E&M-EST. PATIENT-LVL III: ICD-10-PCS | Mod: PBBFAC,,, | Performed by: INTERNAL MEDICINE

## 2020-01-01 PROCEDURE — C1751 CATH, INF, PER/CENT/MIDLINE: HCPCS

## 2020-01-01 PROCEDURE — 99291 PR CRITICAL CARE, E/M 30-74 MINUTES: ICD-10-PCS | Mod: ,,, | Performed by: EMERGENCY MEDICINE

## 2020-01-01 PROCEDURE — 37000008 HC ANESTHESIA 1ST 15 MINUTES: Performed by: SURGERY

## 2020-01-01 PROCEDURE — 84134 ASSAY OF PREALBUMIN: CPT

## 2020-01-01 PROCEDURE — 71000016 HC POSTOP RECOV ADDL HR: Performed by: SURGERY

## 2020-01-01 PROCEDURE — 52000 PR CYSTOURETHROSCOPY: ICD-10-PCS | Mod: ,,, | Performed by: UROLOGY

## 2020-01-01 PROCEDURE — 87070 CULTURE OTHR SPECIMN AEROBIC: CPT

## 2020-01-01 PROCEDURE — 99223 1ST HOSP IP/OBS HIGH 75: CPT | Mod: ,,, | Performed by: PHYSICIAN ASSISTANT

## 2020-01-01 PROCEDURE — D9220A PRA ANESTHESIA: ICD-10-PCS | Mod: CRNA,,, | Performed by: NURSE ANESTHETIST, CERTIFIED REGISTERED

## 2020-01-01 PROCEDURE — 52000 CYSTOURETHROSCOPY: CPT | Mod: ,,, | Performed by: UROLOGY

## 2020-01-01 PROCEDURE — 1126F PR PAIN SEVERITY QUANTIFIED, NO PAIN PRESENT: ICD-10-PCS | Mod: S$GLB,,, | Performed by: NURSE PRACTITIONER

## 2020-01-01 PROCEDURE — 88342 IMHCHEM/IMCYTCHM 1ST ANTB: CPT | Mod: 91 | Performed by: PATHOLOGY

## 2020-01-01 PROCEDURE — 96417 CHEMO IV INFUS EACH ADDL SEQ: CPT

## 2020-01-01 PROCEDURE — 99291 CRITICAL CARE FIRST HOUR: CPT | Mod: ,,, | Performed by: EMERGENCY MEDICINE

## 2020-01-01 PROCEDURE — 87045 FECES CULTURE AEROBIC BACT: CPT | Mod: 59

## 2020-01-01 PROCEDURE — 83036 HEMOGLOBIN GLYCOSYLATED A1C: CPT

## 2020-01-01 PROCEDURE — 71000015 HC POSTOP RECOV 1ST HR: Performed by: SURGERY

## 2020-01-01 PROCEDURE — 80061 LIPID PANEL: CPT

## 2020-01-01 PROCEDURE — 93356 MYOCRD STRAIN IMG SPCKL TRCK: CPT | Mod: S$GLB,,, | Performed by: INTERNAL MEDICINE

## 2020-01-01 PROCEDURE — 88341 PR IHC OR ICC EACH ADD'L SINGLE ANTIBODY  STAINPR: ICD-10-PCS | Mod: 26,59,, | Performed by: PATHOLOGY

## 2020-01-01 PROCEDURE — D9220A PRA ANESTHESIA: ICD-10-PCS | Mod: ,,, | Performed by: ANESTHESIOLOGY

## 2020-01-01 PROCEDURE — 71000039 HC RECOVERY, EACH ADD'L HOUR: Performed by: STUDENT IN AN ORGANIZED HEALTH CARE EDUCATION/TRAINING PROGRAM

## 2020-01-01 PROCEDURE — 96377 APPLICATON ON-BODY INJECTOR: CPT | Mod: 59

## 2020-01-01 PROCEDURE — 12002 RPR S/N/AX/GEN/TRNK2.6-7.5CM: CPT

## 2020-01-01 PROCEDURE — 1125F PR PAIN SEVERITY QUANTIFIED, PAIN PRESENT: ICD-10-PCS | Mod: S$GLB,,, | Performed by: INTERNAL MEDICINE

## 2020-01-01 PROCEDURE — 99225 PR SUBSEQUENT OBSERVATION CARE,LEVEL II: ICD-10-PCS | Mod: GC,,, | Performed by: HOSPITALIST

## 2020-01-01 PROCEDURE — 99233 SBSQ HOSP IP/OBS HIGH 50: CPT | Mod: ,,, | Performed by: PHYSICIAN ASSISTANT

## 2020-01-01 PROCEDURE — 36000707: Performed by: STUDENT IN AN ORGANIZED HEALTH CARE EDUCATION/TRAINING PROGRAM

## 2020-01-01 PROCEDURE — 88307 PR  SURG PATH,LEVEL V: ICD-10-PCS | Mod: 26,,, | Performed by: PATHOLOGY

## 2020-01-01 PROCEDURE — 96361 HYDRATE IV INFUSION ADD-ON: CPT

## 2020-01-01 PROCEDURE — 1126F AMNT PAIN NOTED NONE PRSNT: CPT | Mod: S$GLB,,, | Performed by: UROLOGY

## 2020-01-01 PROCEDURE — 99999 PR PBB SHADOW E&M-EST. PATIENT-LVL IV: ICD-10-PCS | Mod: PBBFAC,,, | Performed by: UROLOGY

## 2020-01-01 PROCEDURE — 99238 PR HOSPITAL DISCHARGE DAY,<30 MIN: ICD-10-PCS | Mod: GC,,, | Performed by: HOSPITALIST

## 2020-01-01 PROCEDURE — A4216 STERILE WATER/SALINE, 10 ML: HCPCS | Performed by: INTERNAL MEDICINE

## 2020-01-01 PROCEDURE — 3080F PR MOST RECENT DIASTOLIC BLOOD PRESSURE >= 90 MM HG: ICD-10-PCS | Mod: S$GLB,,, | Performed by: UROLOGY

## 2020-01-01 PROCEDURE — 97535 SELF CARE MNGMENT TRAINING: CPT | Mod: CO

## 2020-01-01 PROCEDURE — 97110 THERAPEUTIC EXERCISES: CPT

## 2020-01-01 PROCEDURE — 1159F PR MEDICATION LIST DOCUMENTED IN MEDICAL RECORD: ICD-10-PCS | Mod: S$GLB,,, | Performed by: NURSE PRACTITIONER

## 2020-01-01 PROCEDURE — 88304 TISSUE EXAM BY PATHOLOGIST: CPT | Performed by: PATHOLOGY

## 2020-01-01 PROCEDURE — 92136 IOL MASTER - OU - BOTH EYES: ICD-10-PCS | Mod: LT,S$GLB,, | Performed by: OPHTHALMOLOGY

## 2020-01-01 PROCEDURE — 90935 HEMODIALYSIS ONE EVALUATION: CPT

## 2020-01-01 PROCEDURE — 99284 PR EMERGENCY DEPT VISIT,LEVEL IV: ICD-10-PCS | Mod: ,,, | Performed by: EMERGENCY MEDICINE

## 2020-01-01 PROCEDURE — 99223 PR INITIAL HOSPITAL CARE,LEVL III: ICD-10-PCS | Mod: GC,,, | Performed by: HOSPITALIST

## 2020-01-01 PROCEDURE — 99291 CRITICAL CARE FIRST HOUR: CPT | Mod: 25

## 2020-01-01 PROCEDURE — 99999 PR PBB SHADOW E&M-EST. PATIENT-LVL V: ICD-10-PCS | Mod: PBBFAC,,, | Performed by: NURSE PRACTITIONER

## 2020-01-01 PROCEDURE — 84439 ASSAY OF FREE THYROXINE: CPT

## 2020-01-01 PROCEDURE — 44155 REMOVAL OF COLON/ILEOSTOMY: CPT | Mod: 22,,, | Performed by: SURGERY

## 2020-01-01 PROCEDURE — 87102 FUNGUS ISOLATION CULTURE: CPT

## 2020-01-01 PROCEDURE — 84443 ASSAY THYROID STIM HORMONE: CPT

## 2020-01-01 PROCEDURE — 99214 OFFICE O/P EST MOD 30 MIN: CPT | Mod: GC,S$GLB,, | Performed by: INTERNAL MEDICINE

## 2020-01-01 PROCEDURE — 99204 PR OFFICE/OUTPT VISIT, NEW, LEVL IV, 45-59 MIN: ICD-10-PCS | Mod: S$GLB,,, | Performed by: NURSE PRACTITIONER

## 2020-01-01 PROCEDURE — 38531 PR BIOPSY/EXCISION, INGUINOFEMORAL NODE(S), OPEN: ICD-10-PCS | Mod: LT,,, | Performed by: SURGERY

## 2020-01-01 PROCEDURE — 3078F DIAST BP <80 MM HG: CPT | Mod: S$GLB,,, | Performed by: NURSE PRACTITIONER

## 2020-01-01 PROCEDURE — P9045 ALBUMIN (HUMAN), 5%, 250 ML: HCPCS | Performed by: NURSE ANESTHETIST, CERTIFIED REGISTERED

## 2020-01-01 PROCEDURE — 99233 PR SUBSEQUENT HOSPITAL CARE,LEVL III: ICD-10-PCS | Mod: ,,, | Performed by: PHYSICIAN ASSISTANT

## 2020-01-01 PROCEDURE — 88311 DECALCIFY TISSUE: CPT | Mod: 26,,, | Performed by: PATHOLOGY

## 2020-01-01 PROCEDURE — 43239 PR EGD, FLEX, W/BIOPSY, SGL/MULTI: ICD-10-PCS | Mod: 51,,, | Performed by: INTERNAL MEDICINE

## 2020-01-01 PROCEDURE — 88304 TISSUE EXAM BY PATHOLOGIST: CPT | Mod: 26,,, | Performed by: PATHOLOGY

## 2020-01-01 PROCEDURE — 3075F SYST BP GE 130 - 139MM HG: CPT | Mod: S$GLB,,, | Performed by: UROLOGY

## 2020-01-01 PROCEDURE — C1765 ADHESION BARRIER: HCPCS | Performed by: SURGERY

## 2020-01-01 PROCEDURE — 82607 VITAMIN B-12: CPT

## 2020-01-01 PROCEDURE — 99999 PR PBB SHADOW E&M-EST. PATIENT-LVL V: CPT | Mod: PBBFAC,,, | Performed by: SURGERY

## 2020-01-01 PROCEDURE — 99499 NO LOS: ICD-10-PCS | Mod: S$GLB,,, | Performed by: NURSE PRACTITIONER

## 2020-01-01 PROCEDURE — 38222 DX BONE MARROW BX & ASPIR: CPT | Mod: LT,S$GLB,, | Performed by: NURSE PRACTITIONER

## 2020-01-01 PROCEDURE — 99217 PR OBSERVATION CARE DISCHARGE: ICD-10-PCS | Mod: GC,,, | Performed by: HOSPITALIST

## 2020-01-01 PROCEDURE — 25500020 PHARM REV CODE 255: Performed by: HOSPITALIST

## 2020-01-01 PROCEDURE — 84133 ASSAY OF URINE POTASSIUM: CPT

## 2020-01-01 PROCEDURE — 85018 HEMOGLOBIN: CPT

## 2020-01-01 PROCEDURE — 99999 PR PBB SHADOW E&M-EST. PATIENT-LVL V: CPT | Mod: PBBFAC,,, | Performed by: INTERNAL MEDICINE

## 2020-01-01 PROCEDURE — 3075F SYST BP GE 130 - 139MM HG: CPT | Mod: S$GLB,,, | Performed by: NURSE PRACTITIONER

## 2020-01-01 PROCEDURE — 99223 PR INITIAL HOSPITAL CARE,LEVL III: ICD-10-PCS | Mod: GC,,, | Performed by: INTERNAL MEDICINE

## 2020-01-01 PROCEDURE — 36000706: Performed by: SURGERY

## 2020-01-01 PROCEDURE — 85097 PR  BONE MARROW,SMEAR INTERPRETATION: ICD-10-PCS | Mod: ,,, | Performed by: PATHOLOGY

## 2020-01-01 PROCEDURE — 88333 PR  INTRAOPERATIVE CYTO PATH CONSULT, INITIAL SITE: ICD-10-PCS | Mod: 26,,, | Performed by: PATHOLOGY

## 2020-01-01 PROCEDURE — 25000003 PHARM REV CODE 250: Performed by: NURSE PRACTITIONER

## 2020-01-01 PROCEDURE — 1101F PR PT FALLS ASSESS DOC 0-1 FALLS W/OUT INJ PAST YR: ICD-10-PCS | Mod: ,,, | Performed by: UROLOGY

## 2020-01-01 PROCEDURE — 99214 PR OFFICE/OUTPT VISIT, EST, LEVL IV, 30-39 MIN: ICD-10-PCS | Mod: 95,,, | Performed by: UROLOGY

## 2020-01-01 PROCEDURE — 85048 AUTOMATED LEUKOCYTE COUNT: CPT

## 2020-01-01 PROCEDURE — C1729 CATH, DRAINAGE: HCPCS | Performed by: SURGERY

## 2020-01-01 PROCEDURE — 1125F AMNT PAIN NOTED PAIN PRSNT: CPT | Mod: S$GLB,,, | Performed by: NURSE PRACTITIONER

## 2020-01-01 PROCEDURE — 87046 STOOL CULTR AEROBIC BACT EA: CPT | Mod: 59

## 2020-01-01 PROCEDURE — 99225 PR SUBSEQUENT OBSERVATION CARE,LEVEL II: CPT | Mod: GC,,, | Performed by: HOSPITALIST

## 2020-01-01 PROCEDURE — P9017 PLASMA 1 DONOR FRZ W/IN 8 HR: HCPCS

## 2020-01-01 PROCEDURE — 25000242 PHARM REV CODE 250 ALT 637 W/ HCPCS: Performed by: NURSE PRACTITIONER

## 2020-01-01 PROCEDURE — 36000707: Performed by: SURGERY

## 2020-01-01 PROCEDURE — 87449 NOS EACH ORGANISM AG IA: CPT

## 2020-01-01 PROCEDURE — A9552 F18 FDG: HCPCS

## 2020-01-01 PROCEDURE — 80076 HEPATIC FUNCTION PANEL: CPT

## 2020-01-01 PROCEDURE — 83970 ASSAY OF PARATHORMONE: CPT

## 2020-01-01 PROCEDURE — D9220A PRA ANESTHESIA: Mod: ,,, | Performed by: ANESTHESIOLOGY

## 2020-01-01 PROCEDURE — 99215 OFFICE O/P EST HI 40 MIN: CPT | Mod: GC,S$GLB,, | Performed by: INTERNAL MEDICINE

## 2020-01-01 PROCEDURE — 99223 1ST HOSP IP/OBS HIGH 75: CPT | Mod: GC,,, | Performed by: HOSPITALIST

## 2020-01-01 PROCEDURE — 99999 PR PBB SHADOW E&M-EST. PATIENT-LVL III: CPT | Mod: PBBFAC,,, | Performed by: OPHTHALMOLOGY

## 2020-01-01 PROCEDURE — 99238 HOSP IP/OBS DSCHRG MGMT 30/<: CPT | Mod: GC,,, | Performed by: HOSPITALIST

## 2020-01-01 PROCEDURE — 99214 PR OFFICE/OUTPT VISIT, EST, LEVL IV, 30-39 MIN: ICD-10-PCS | Mod: S$GLB,,, | Performed by: UROLOGY

## 2020-01-01 PROCEDURE — 88311 DECALCIFY TISSUE: CPT | Performed by: PATHOLOGY

## 2020-01-01 PROCEDURE — 99215 PR OFFICE/OUTPT VISIT, EST, LEVL V, 40-54 MIN: ICD-10-PCS | Mod: GC,,, | Performed by: INTERNAL MEDICINE

## 2020-01-01 PROCEDURE — 99999 PR PBB SHADOW E&M-EST. PATIENT-LVL IV: ICD-10-PCS | Mod: PBBFAC,,, | Performed by: INTERNAL MEDICINE

## 2020-01-01 PROCEDURE — 99999 PR PBB SHADOW E&M-EST. PATIENT-LVL IV: CPT | Mod: PBBFAC,,, | Performed by: UROLOGY

## 2020-01-01 PROCEDURE — 71000033 HC RECOVERY, INTIAL HOUR: Performed by: STUDENT IN AN ORGANIZED HEALTH CARE EDUCATION/TRAINING PROGRAM

## 2020-01-01 PROCEDURE — 36410 VNPNXR 3YR/> PHY/QHP DX/THER: CPT

## 2020-01-01 PROCEDURE — 63600175 PHARM REV CODE 636 W HCPCS: Performed by: ANESTHESIOLOGY

## 2020-01-01 PROCEDURE — 85060 BLOOD SMEAR INTERPRETATION: CPT | Mod: ,,, | Performed by: PATHOLOGY

## 2020-01-01 PROCEDURE — 82306 VITAMIN D 25 HYDROXY: CPT

## 2020-01-01 PROCEDURE — 99214 OFFICE O/P EST MOD 30 MIN: CPT | Mod: 95,,, | Performed by: UROLOGY

## 2020-01-01 PROCEDURE — 86580 TB INTRADERMAL TEST: CPT | Performed by: INTERNAL MEDICINE

## 2020-01-01 PROCEDURE — 1159F MED LIST DOCD IN RCRD: CPT | Mod: S$GLB,,, | Performed by: UROLOGY

## 2020-01-01 PROCEDURE — 37000008 HC ANESTHESIA 1ST 15 MINUTES: Performed by: INTERNAL MEDICINE

## 2020-01-01 PROCEDURE — 99999 PR PBB SHADOW E&M-EST. PATIENT-LVL V: ICD-10-PCS | Mod: PBBFAC,,, | Performed by: SURGERY

## 2020-01-01 PROCEDURE — P9047 ALBUMIN (HUMAN), 25%, 50ML: HCPCS | Performed by: HOSPITALIST

## 2020-01-01 PROCEDURE — 1126F AMNT PAIN NOTED NONE PRSNT: CPT | Mod: S$GLB,,, | Performed by: NURSE PRACTITIONER

## 2020-01-01 PROCEDURE — 86703 HIV-1/HIV-2 1 RESULT ANTBDY: CPT

## 2020-01-01 PROCEDURE — 37000009 HC ANESTHESIA EA ADD 15 MINS: Performed by: INTERNAL MEDICINE

## 2020-01-01 PROCEDURE — 1125F AMNT PAIN NOTED PAIN PRSNT: CPT | Mod: S$GLB,,, | Performed by: INTERNAL MEDICINE

## 2020-01-01 PROCEDURE — 86901 BLOOD TYPING SEROLOGIC RH(D): CPT

## 2020-01-01 PROCEDURE — 99217 PR OBSERVATION CARE DISCHARGE: CPT | Mod: GC,,, | Performed by: HOSPITALIST

## 2020-01-01 PROCEDURE — 93356 ECHO (CUPID ONLY): ICD-10-PCS | Mod: S$GLB,,, | Performed by: INTERNAL MEDICINE

## 2020-01-01 PROCEDURE — 1101F PT FALLS ASSESS-DOCD LE1/YR: CPT | Mod: S$GLB,,, | Performed by: UROLOGY

## 2020-01-01 PROCEDURE — 99285 EMERGENCY DEPT VISIT HI MDM: CPT | Mod: 25

## 2020-01-01 PROCEDURE — 44120 PR RESECT SMALL INTEST,SINGL RESEC/ANAS: ICD-10-PCS | Mod: 58,80,, | Performed by: STUDENT IN AN ORGANIZED HEALTH CARE EDUCATION/TRAINING PROGRAM

## 2020-01-01 PROCEDURE — 99999 PR PBB SHADOW E&M-EST. PATIENT-LVL V: CPT | Mod: PBBFAC,,, | Performed by: NURSE PRACTITIONER

## 2020-01-01 PROCEDURE — 99999 PR PBB SHADOW E&M-EST. PATIENT-LVL III: ICD-10-PCS | Mod: PBBFAC,,, | Performed by: OPHTHALMOLOGY

## 2020-01-01 PROCEDURE — 82310 ASSAY OF CALCIUM: CPT

## 2020-01-01 PROCEDURE — 99223 1ST HOSP IP/OBS HIGH 75: CPT | Mod: S$GLB,,, | Performed by: INTERNAL MEDICINE

## 2020-01-01 PROCEDURE — 1101F PT FALLS ASSESS-DOCD LE1/YR: CPT | Mod: ,,, | Performed by: UROLOGY

## 2020-01-01 PROCEDURE — 63600175 PHARM REV CODE 636 W HCPCS: Mod: TB | Performed by: INTERNAL MEDICINE

## 2020-01-01 PROCEDURE — 97530 THERAPEUTIC ACTIVITIES: CPT | Performed by: PHYSICAL THERAPIST

## 2020-01-01 PROCEDURE — 99215 OFFICE O/P EST HI 40 MIN: CPT | Mod: S$GLB,,, | Performed by: NURSE PRACTITIONER

## 2020-01-01 PROCEDURE — A9698 NON-RAD CONTRAST MATERIALNOC: HCPCS

## 2020-01-01 PROCEDURE — 87799 DETECT AGENT NOS DNA QUANT: CPT

## 2020-01-01 PROCEDURE — 25000242 PHARM REV CODE 250 ALT 637 W/ HCPCS

## 2020-01-01 PROCEDURE — 96365 THER/PROPH/DIAG IV INF INIT: CPT

## 2020-01-01 PROCEDURE — 92004 COMPRE OPH EXAM NEW PT 1/>: CPT | Mod: S$GLB,,, | Performed by: OPHTHALMOLOGY

## 2020-01-01 PROCEDURE — 87176 TISSUE HOMOGENIZATION CULTR: CPT

## 2020-01-01 PROCEDURE — 78815 NM PET CT ROUTINE: ICD-10-PCS | Mod: 26,PI,, | Performed by: RADIOLOGY

## 2020-01-01 PROCEDURE — 97162 PT EVAL MOD COMPLEX 30 MIN: CPT

## 2020-01-01 PROCEDURE — S0030 INJECTION, METRONIDAZOLE: HCPCS | Performed by: STUDENT IN AN ORGANIZED HEALTH CARE EDUCATION/TRAINING PROGRAM

## 2020-01-01 PROCEDURE — 83605 ASSAY OF LACTIC ACID: CPT | Mod: 91

## 2020-01-01 PROCEDURE — C1788 PORT, INDWELLING, IMP: HCPCS | Performed by: SURGERY

## 2020-01-01 PROCEDURE — S0030 INJECTION, METRONIDAZOLE: HCPCS | Performed by: EMERGENCY MEDICINE

## 2020-01-01 PROCEDURE — 38531 OPEN BX/EXC INGUINOFEM NODES: CPT | Mod: LT,,, | Performed by: SURGERY

## 2020-01-01 PROCEDURE — 99220 PR INITIAL OBSERVATION CARE,LEVL III: CPT | Mod: GC,,, | Performed by: HOSPITALIST

## 2020-01-01 PROCEDURE — 83010 ASSAY OF HAPTOGLOBIN QUANT: CPT

## 2020-01-01 PROCEDURE — 43239 EGD BIOPSY SINGLE/MULTIPLE: CPT | Performed by: INTERNAL MEDICINE

## 2020-01-01 PROCEDURE — 87427 SHIGA-LIKE TOXIN AG IA: CPT

## 2020-01-01 PROCEDURE — 80202 ASSAY OF VANCOMYCIN: CPT | Mod: 91

## 2020-01-01 PROCEDURE — 99214 OFFICE O/P EST MOD 30 MIN: CPT | Mod: S$GLB,,, | Performed by: NURSE PRACTITIONER

## 2020-01-01 PROCEDURE — 63600175 PHARM REV CODE 636 W HCPCS: Mod: JG | Performed by: INTERNAL MEDICINE

## 2020-01-01 PROCEDURE — 47600 CHOLECYSTECTOMY: CPT | Mod: 51,,, | Performed by: SURGERY

## 2020-01-01 PROCEDURE — 92136 OPHTHALMIC BIOMETRY: CPT | Mod: LT,S$GLB,, | Performed by: OPHTHALMOLOGY

## 2020-01-01 PROCEDURE — 99285 PR EMERGENCY DEPT VISIT,LEVEL V: ICD-10-PCS | Mod: ,,, | Performed by: PHYSICIAN ASSISTANT

## 2020-01-01 PROCEDURE — 1126F PR PAIN SEVERITY QUANTIFIED, NO PAIN PRESENT: ICD-10-PCS | Mod: S$GLB,,, | Performed by: UROLOGY

## 2020-01-01 PROCEDURE — 99999 PR PBB SHADOW E&M-EST. PATIENT-LVL III: CPT | Mod: PBBFAC,,, | Performed by: INTERNAL MEDICINE

## 2020-01-01 PROCEDURE — 88341 IMHCHEM/IMCYTCHM EA ADD ANTB: CPT | Mod: 26,59,, | Performed by: PATHOLOGY

## 2020-01-01 PROCEDURE — 87493 C DIFF AMPLIFIED PROBE: CPT

## 2020-01-01 PROCEDURE — 99215 OFFICE O/P EST HI 40 MIN: CPT | Mod: GC,,, | Performed by: INTERNAL MEDICINE

## 2020-01-01 PROCEDURE — 99499 UNLISTED E&M SERVICE: CPT | Mod: S$GLB,,, | Performed by: NURSE PRACTITIONER

## 2020-01-01 PROCEDURE — 3080F DIAST BP >= 90 MM HG: CPT | Mod: S$GLB,,, | Performed by: UROLOGY

## 2020-01-01 PROCEDURE — 85007 BL SMEAR W/DIFF WBC COUNT: CPT | Mod: 91

## 2020-01-01 PROCEDURE — 88341 IMHCHEM/IMCYTCHM EA ADD ANTB: CPT | Mod: 59 | Performed by: PATHOLOGY

## 2020-01-01 PROCEDURE — 99223 PR INITIAL HOSPITAL CARE,LEVL III: ICD-10-PCS | Mod: S$GLB,,, | Performed by: INTERNAL MEDICINE

## 2020-01-01 PROCEDURE — 3075F PR MOST RECENT SYSTOLIC BLOOD PRESS GE 130-139MM HG: ICD-10-PCS | Mod: S$GLB,,, | Performed by: NURSE PRACTITIONER

## 2020-01-01 PROCEDURE — 44120 REMOVAL OF SMALL INTESTINE: CPT | Mod: 58,,, | Performed by: SURGERY

## 2020-01-01 PROCEDURE — 45380 COLONOSCOPY AND BIOPSY: CPT | Performed by: INTERNAL MEDICINE

## 2020-01-01 PROCEDURE — 88360 PR  TUMOR IMMUNOHISTOCHEM/MANUAL: ICD-10-PCS | Mod: 26,,, | Performed by: PATHOLOGY

## 2020-01-01 PROCEDURE — 44120 REMOVAL OF SMALL INTESTINE: CPT | Mod: 58,80,, | Performed by: STUDENT IN AN ORGANIZED HEALTH CARE EDUCATION/TRAINING PROGRAM

## 2020-01-01 PROCEDURE — 88312 SPECIAL STAINS GROUP 1: CPT | Mod: 26,,, | Performed by: PATHOLOGY

## 2020-01-01 PROCEDURE — 25000242 PHARM REV CODE 250 ALT 637 W/ HCPCS: Performed by: INTERNAL MEDICINE

## 2020-01-01 PROCEDURE — 83690 ASSAY OF LIPASE: CPT

## 2020-01-01 PROCEDURE — 63600175 PHARM REV CODE 636 W HCPCS: Performed by: RADIOLOGY

## 2020-01-01 PROCEDURE — 1125F PR PAIN SEVERITY QUANTIFIED, PAIN PRESENT: ICD-10-PCS | Mod: S$GLB,,, | Performed by: NURSE PRACTITIONER

## 2020-01-01 PROCEDURE — 77001 FLUOROGUIDE FOR VEIN DEVICE: CPT | Mod: 26,,, | Performed by: SURGERY

## 2020-01-01 PROCEDURE — 88365 INSITU HYBRIDIZATION (FISH): CPT | Performed by: PATHOLOGY

## 2020-01-01 DEVICE — MEMBRANE SEPRAFILM 5 X 6: Type: IMPLANTABLE DEVICE | Site: ABDOMEN | Status: FUNCTIONAL

## 2020-01-01 DEVICE — KIT POWERPORT SINGLE 8FR: Type: IMPLANTABLE DEVICE | Site: CHEST | Status: FUNCTIONAL

## 2020-01-01 RX ORDER — TACROLIMUS 1 MG/1
3 CAPSULE ORAL EVERY MORNING
Status: DISCONTINUED | OUTPATIENT
Start: 2020-01-01 | End: 2020-01-01 | Stop reason: HOSPADM

## 2020-01-01 RX ORDER — CALCIUM CHLORIDE INJECTION 100 MG/ML
INJECTION, SOLUTION INTRAVENOUS CODE/TRAUMA/SEDATION MEDICATION
Status: COMPLETED | OUTPATIENT
Start: 2020-01-01 | End: 2020-01-01

## 2020-01-01 RX ORDER — ACETAMINOPHEN 325 MG/1
650 TABLET ORAL
Status: COMPLETED | OUTPATIENT
Start: 2020-01-01 | End: 2020-01-01

## 2020-01-01 RX ORDER — SODIUM CHLORIDE 0.9 % (FLUSH) 0.9 %
10 SYRINGE (ML) INJECTION
Status: DISCONTINUED | OUTPATIENT
Start: 2020-01-01 | End: 2020-01-01 | Stop reason: HOSPADM

## 2020-01-01 RX ORDER — PREDNISONE 50 MG/1
100 TABLET ORAL DAILY
Qty: 8 TABLET | Refills: 5 | Status: SHIPPED | OUTPATIENT
Start: 2020-01-01 | End: 2020-01-01

## 2020-01-01 RX ORDER — HYDROCODONE BITARTRATE AND ACETAMINOPHEN 500; 5 MG/1; MG/1
TABLET ORAL
Status: DISCONTINUED | OUTPATIENT
Start: 2020-01-01 | End: 2020-01-01

## 2020-01-01 RX ORDER — PREDNISONE 5 MG/1
5 TABLET ORAL DAILY
Status: DISCONTINUED | OUTPATIENT
Start: 2020-01-01 | End: 2020-01-01 | Stop reason: HOSPADM

## 2020-01-01 RX ORDER — MYCOPHENOLATE MOFETIL 250 MG/1
750 CAPSULE ORAL 2 TIMES DAILY
Qty: 180 CAPSULE | Refills: 11
Start: 2020-01-01 | End: 2020-01-01 | Stop reason: SDUPTHER

## 2020-01-01 RX ORDER — PROPOFOL 10 MG/ML
INJECTION, EMULSION INTRAVENOUS
Status: DISPENSED
Start: 2020-01-01 | End: 2020-01-01

## 2020-01-01 RX ORDER — FAMOTIDINE 10 MG/ML
20 INJECTION INTRAVENOUS
Status: COMPLETED | OUTPATIENT
Start: 2020-01-01 | End: 2020-01-01

## 2020-01-01 RX ORDER — POTASSIUM CHLORIDE 7.45 MG/ML
40 INJECTION INTRAVENOUS
Status: DISCONTINUED | OUTPATIENT
Start: 2020-01-01 | End: 2020-01-01

## 2020-01-01 RX ORDER — PHENYLEPHRINE HYDROCHLORIDE 10 MG/ML
INJECTION INTRAVENOUS
Status: DISCONTINUED | OUTPATIENT
Start: 2020-01-01 | End: 2020-01-01

## 2020-01-01 RX ORDER — ALLOPURINOL 100 MG/1
100 TABLET ORAL DAILY
Status: DISCONTINUED | OUTPATIENT
Start: 2020-01-01 | End: 2020-01-01 | Stop reason: HOSPADM

## 2020-01-01 RX ORDER — MORPHINE SULFATE 4 MG/ML
4 INJECTION, SOLUTION INTRAMUSCULAR; INTRAVENOUS
Status: DISCONTINUED | OUTPATIENT
Start: 2020-01-01 | End: 2020-01-01

## 2020-01-01 RX ORDER — GLYCOPYRROLATE 0.2 MG/ML
INJECTION INTRAMUSCULAR; INTRAVENOUS
Status: DISCONTINUED | OUTPATIENT
Start: 2020-01-01 | End: 2020-01-01

## 2020-01-01 RX ORDER — HYDROCODONE BITARTRATE AND ACETAMINOPHEN 500; 5 MG/1; MG/1
TABLET ORAL
Status: DISCONTINUED | OUTPATIENT
Start: 2020-01-01 | End: 2020-01-01 | Stop reason: HOSPADM

## 2020-01-01 RX ORDER — FERROUS SULFATE 325(65) MG
325 TABLET, DELAYED RELEASE (ENTERIC COATED) ORAL 2 TIMES DAILY
Status: DISCONTINUED | OUTPATIENT
Start: 2020-01-01 | End: 2020-01-01

## 2020-01-01 RX ORDER — DEXTROSE MONOHYDRATE 100 MG/ML
25 INJECTION, SOLUTION INTRAVENOUS
Status: DISCONTINUED | OUTPATIENT
Start: 2020-01-01 | End: 2020-01-01 | Stop reason: HOSPADM

## 2020-01-01 RX ORDER — CALCIUM GLUCONATE 20 MG/ML
INJECTION, SOLUTION INTRAVENOUS ONCE
Status: COMPLETED | OUTPATIENT
Start: 2020-01-01 | End: 2020-01-01

## 2020-01-01 RX ORDER — MEPERIDINE HYDROCHLORIDE 50 MG/ML
25 INJECTION INTRAMUSCULAR; INTRAVENOUS; SUBCUTANEOUS EVERY 30 MIN PRN
Status: CANCELLED | OUTPATIENT
Start: 2020-01-01

## 2020-01-01 RX ORDER — SODIUM CHLORIDE 9 MG/ML
INJECTION, SOLUTION INTRAVENOUS ONCE
Status: CANCELLED | OUTPATIENT
Start: 2020-01-01 | End: 2020-01-01

## 2020-01-01 RX ORDER — DOXORUBICIN HYDROCHLORIDE 2 MG/ML
50 INJECTION, SOLUTION INTRAVENOUS
Status: CANCELLED | OUTPATIENT
Start: 2020-01-01

## 2020-01-01 RX ORDER — DEXAMETHASONE SODIUM PHOSPHATE 4 MG/ML
INJECTION, SOLUTION INTRA-ARTICULAR; INTRALESIONAL; INTRAMUSCULAR; INTRAVENOUS; SOFT TISSUE
Status: DISCONTINUED | OUTPATIENT
Start: 2020-01-01 | End: 2020-01-01

## 2020-01-01 RX ORDER — SODIUM BICARBONATE 650 MG/1
650 TABLET ORAL 2 TIMES DAILY
Status: DISCONTINUED | OUTPATIENT
Start: 2020-01-01 | End: 2020-01-01 | Stop reason: HOSPADM

## 2020-01-01 RX ORDER — PROPOFOL 10 MG/ML
0-50 INJECTION, EMULSION INTRAVENOUS CONTINUOUS
Status: DISCONTINUED | OUTPATIENT
Start: 2020-01-01 | End: 2020-01-01 | Stop reason: HOSPADM

## 2020-01-01 RX ORDER — METRONIDAZOLE 500 MG/1
500 TABLET ORAL EVERY 8 HOURS
Qty: 16 TABLET | Refills: 0 | Status: SHIPPED | OUTPATIENT
Start: 2020-01-01 | End: 2020-01-01

## 2020-01-01 RX ORDER — MORPHINE SULFATE 4 MG/ML
8 INJECTION, SOLUTION INTRAMUSCULAR; INTRAVENOUS
Status: DISCONTINUED | OUTPATIENT
Start: 2020-01-01 | End: 2020-01-01

## 2020-01-01 RX ORDER — SUCCINYLCHOLINE CHLORIDE 20 MG/ML
INJECTION INTRAMUSCULAR; INTRAVENOUS
Status: DISCONTINUED | OUTPATIENT
Start: 2020-01-01 | End: 2020-01-01

## 2020-01-01 RX ORDER — HEPARIN SODIUM (PORCINE) LOCK FLUSH IV SOLN 100 UNIT/ML 100 UNIT/ML
SOLUTION INTRAVENOUS
Status: DISCONTINUED | OUTPATIENT
Start: 2020-01-01 | End: 2020-01-01 | Stop reason: HOSPADM

## 2020-01-01 RX ORDER — HYOSCYAMINE SULFATE 0.12 MG/1
0.12 TABLET SUBLINGUAL EVERY 4 HOURS PRN
Status: DISCONTINUED | OUTPATIENT
Start: 2020-01-01 | End: 2020-01-01 | Stop reason: HOSPADM

## 2020-01-01 RX ORDER — FENTANYL CITRATE 50 UG/ML
INJECTION, SOLUTION INTRAMUSCULAR; INTRAVENOUS
Status: DISCONTINUED | OUTPATIENT
Start: 2020-01-01 | End: 2020-01-01

## 2020-01-01 RX ORDER — TACROLIMUS 1 MG/1
2 CAPSULE ORAL EVERY 12 HOURS
Qty: 450 CAPSULE | Refills: 3 | Status: SHIPPED | OUTPATIENT
Start: 2020-01-01 | End: 2020-01-01 | Stop reason: DRUGHIGH

## 2020-01-01 RX ORDER — ACETAMINOPHEN 325 MG/1
650 TABLET ORAL
Status: CANCELLED | OUTPATIENT
Start: 2020-01-01

## 2020-01-01 RX ORDER — TACROLIMUS 1 MG/1
CAPSULE ORAL
Qty: 150 CAPSULE | Refills: 11 | Status: SHIPPED | OUTPATIENT
Start: 2020-01-01 | End: 2020-01-01 | Stop reason: SDUPTHER

## 2020-01-01 RX ORDER — ALBUMIN HUMAN 250 G/1000ML
50 SOLUTION INTRAVENOUS EVERY 4 HOURS
Status: DISPENSED | OUTPATIENT
Start: 2020-01-01 | End: 2020-01-01

## 2020-01-01 RX ORDER — ASPIRIN 325 MG
325 TABLET ORAL
Status: COMPLETED | OUTPATIENT
Start: 2020-01-01 | End: 2020-01-01

## 2020-01-01 RX ORDER — SODIUM CHLORIDE 9 MG/ML
INJECTION, SOLUTION INTRAVENOUS CONTINUOUS
Status: CANCELLED | OUTPATIENT
Start: 2020-01-01

## 2020-01-01 RX ORDER — DRONABINOL 2.5 MG/1
5 CAPSULE ORAL 2 TIMES DAILY
Status: DISCONTINUED | OUTPATIENT
Start: 2020-01-01 | End: 2020-01-01

## 2020-01-01 RX ORDER — MEPERIDINE HYDROCHLORIDE 50 MG/ML
25 INJECTION INTRAMUSCULAR; INTRAVENOUS; SUBCUTANEOUS EVERY 30 MIN PRN
Status: DISCONTINUED | OUTPATIENT
Start: 2020-01-01 | End: 2020-01-01 | Stop reason: HOSPADM

## 2020-01-01 RX ORDER — SODIUM CHLORIDE, SODIUM LACTATE, POTASSIUM CHLORIDE, CALCIUM CHLORIDE 600; 310; 30; 20 MG/100ML; MG/100ML; MG/100ML; MG/100ML
INJECTION, SOLUTION INTRAVENOUS CONTINUOUS
Status: DISCONTINUED | OUTPATIENT
Start: 2020-01-01 | End: 2020-01-01

## 2020-01-01 RX ORDER — ONDANSETRON 2 MG/ML
4 INJECTION INTRAMUSCULAR; INTRAVENOUS EVERY 8 HOURS PRN
Status: DISCONTINUED | OUTPATIENT
Start: 2020-01-01 | End: 2020-01-01 | Stop reason: HOSPADM

## 2020-01-01 RX ORDER — ONDANSETRON 2 MG/ML
INJECTION INTRAMUSCULAR; INTRAVENOUS
Status: DISCONTINUED | OUTPATIENT
Start: 2020-01-01 | End: 2020-01-01

## 2020-01-01 RX ORDER — VANCOMYCIN HYDROCHLORIDE 500 MG/100ML
500 INJECTION, SOLUTION INTRAVENOUS ONCE
Status: DISCONTINUED | OUTPATIENT
Start: 2020-01-01 | End: 2020-01-01 | Stop reason: SDUPTHER

## 2020-01-01 RX ORDER — POTASSIUM CHLORIDE 7.45 MG/ML
80 INJECTION INTRAVENOUS
Status: DISCONTINUED | OUTPATIENT
Start: 2020-01-01 | End: 2020-01-01

## 2020-01-01 RX ORDER — CEFPODOXIME PROXETIL 100 MG/1
100 TABLET, FILM COATED ORAL EVERY 12 HOURS
Qty: 60 TABLET | Refills: 3 | Status: SHIPPED | OUTPATIENT
Start: 2020-01-01

## 2020-01-01 RX ORDER — TACROLIMUS 1 MG/1
2 CAPSULE ORAL EVERY MORNING
Status: DISCONTINUED | OUTPATIENT
Start: 2020-01-01 | End: 2020-01-01 | Stop reason: HOSPADM

## 2020-01-01 RX ORDER — KETOCONAZOLE 200 MG/1
TABLET ORAL
Qty: 45 TABLET | Refills: 3 | Status: SHIPPED | OUTPATIENT
Start: 2020-01-01

## 2020-01-01 RX ORDER — CIPROFLOXACIN 500 MG/1
500 TABLET ORAL EVERY 12 HOURS
Status: DISCONTINUED | OUTPATIENT
Start: 2020-01-01 | End: 2020-01-01

## 2020-01-01 RX ORDER — DEXTROSE MONOHYDRATE AND SODIUM CHLORIDE 5; .45 G/100ML; G/100ML
INJECTION, SOLUTION INTRAVENOUS CONTINUOUS
Status: DISCONTINUED | OUTPATIENT
Start: 2020-01-01 | End: 2020-01-01

## 2020-01-01 RX ORDER — DOXORUBICIN HYDROCHLORIDE 2 MG/ML
50 INJECTION, SOLUTION INTRAVENOUS
Status: COMPLETED | OUTPATIENT
Start: 2020-01-01 | End: 2020-01-01

## 2020-01-01 RX ORDER — FENTANYL CITRATE 50 UG/ML
25 INJECTION, SOLUTION INTRAMUSCULAR; INTRAVENOUS EVERY 5 MIN PRN
Status: DISCONTINUED | OUTPATIENT
Start: 2020-01-01 | End: 2020-01-01 | Stop reason: HOSPADM

## 2020-01-01 RX ORDER — NOREPINEPHRINE BITARTRATE/D5W 4MG/250ML
PLASTIC BAG, INJECTION (ML) INTRAVENOUS
Status: DISPENSED
Start: 2020-01-01 | End: 2020-01-01

## 2020-01-01 RX ORDER — DEXMEDETOMIDINE HYDROCHLORIDE 4 UG/ML
INJECTION, SOLUTION INTRAVENOUS
Status: DISPENSED
Start: 2020-01-01 | End: 2020-01-01

## 2020-01-01 RX ORDER — SULFAMETHOXAZOLE AND TRIMETHOPRIM 800; 160 MG/1; MG/1
1 TABLET ORAL 2 TIMES DAILY
Qty: 28 TABLET | Refills: 0 | Status: SHIPPED | OUTPATIENT
Start: 2020-01-01 | End: 2020-01-01

## 2020-01-01 RX ORDER — LIDOCAINE HYDROCHLORIDE 20 MG/ML
JELLY TOPICAL ONCE
Status: CANCELLED | OUTPATIENT
Start: 2020-01-01 | End: 2020-01-01

## 2020-01-01 RX ORDER — METOPROLOL TARTRATE 25 MG/1
12.5 TABLET ORAL 2 TIMES DAILY
Status: DISCONTINUED | OUTPATIENT
Start: 2020-01-01 | End: 2020-01-01 | Stop reason: HOSPADM

## 2020-01-01 RX ORDER — CEPHALEXIN 500 MG/1
500 CAPSULE ORAL EVERY 8 HOURS
Qty: 30 CAPSULE | Refills: 0 | Status: SHIPPED | OUTPATIENT
Start: 2020-01-01 | End: 2020-01-01

## 2020-01-01 RX ORDER — MUPIROCIN 20 MG/G
OINTMENT TOPICAL DAILY
Status: DISCONTINUED | OUTPATIENT
Start: 2020-01-01 | End: 2020-01-01 | Stop reason: SDUPTHER

## 2020-01-01 RX ORDER — DRONABINOL 2.5 MG/1
2.5 CAPSULE ORAL 2 TIMES DAILY
Status: DISCONTINUED | OUTPATIENT
Start: 2020-01-01 | End: 2020-01-01

## 2020-01-01 RX ORDER — METRONIDAZOLE 500 MG/100ML
500 INJECTION, SOLUTION INTRAVENOUS
Status: DISCONTINUED | OUTPATIENT
Start: 2020-01-01 | End: 2020-01-01 | Stop reason: HOSPADM

## 2020-01-01 RX ORDER — PROMETHAZINE HYDROCHLORIDE 25 MG/1
25 TABLET ORAL EVERY 6 HOURS PRN
Status: DISCONTINUED | OUTPATIENT
Start: 2020-01-01 | End: 2020-01-01 | Stop reason: HOSPADM

## 2020-01-01 RX ORDER — MUPIROCIN 20 MG/G
OINTMENT TOPICAL 2 TIMES DAILY
Status: DISCONTINUED | OUTPATIENT
Start: 2020-01-01 | End: 2020-01-01

## 2020-01-01 RX ORDER — IBUPROFEN 200 MG
16 TABLET ORAL
Status: DISCONTINUED | OUTPATIENT
Start: 2020-01-01 | End: 2020-01-01 | Stop reason: HOSPADM

## 2020-01-01 RX ORDER — ACETAMINOPHEN 325 MG/1
650 TABLET ORAL EVERY 4 HOURS PRN
Status: DISCONTINUED | OUTPATIENT
Start: 2020-01-01 | End: 2020-01-01

## 2020-01-01 RX ORDER — MORPHINE SULFATE 4 MG/ML
4 INJECTION, SOLUTION INTRAMUSCULAR; INTRAVENOUS EVERY 4 HOURS PRN
Status: DISCONTINUED | OUTPATIENT
Start: 2020-01-01 | End: 2020-01-01

## 2020-01-01 RX ORDER — ONDANSETRON HYDROCHLORIDE 2 MG/ML
INJECTION, SOLUTION INTRAMUSCULAR; INTRAVENOUS
Status: DISCONTINUED | OUTPATIENT
Start: 2020-01-01 | End: 2020-01-01

## 2020-01-01 RX ORDER — CIPROFLOXACIN 500 MG/1
500 TABLET ORAL EVERY 12 HOURS
Qty: 6 TABLET | Refills: 0 | Status: SHIPPED | OUTPATIENT
Start: 2020-01-01 | End: 2020-01-01

## 2020-01-01 RX ORDER — TACROLIMUS 1 MG/1
2 CAPSULE ORAL EVERY EVENING
Status: DISCONTINUED | OUTPATIENT
Start: 2020-01-01 | End: 2020-01-01 | Stop reason: HOSPADM

## 2020-01-01 RX ORDER — ALBUMIN HUMAN 250 G/1000ML
50 SOLUTION INTRAVENOUS ONCE
Status: DISCONTINUED | OUTPATIENT
Start: 2020-01-01 | End: 2020-01-01

## 2020-01-01 RX ORDER — CIPROFLOXACIN 2 MG/ML
400 INJECTION, SOLUTION INTRAVENOUS
Status: DISCONTINUED | OUTPATIENT
Start: 2020-01-01 | End: 2020-01-01

## 2020-01-01 RX ORDER — ONDANSETRON 2 MG/ML
4 INJECTION INTRAMUSCULAR; INTRAVENOUS ONCE AS NEEDED
Status: DISCONTINUED | OUTPATIENT
Start: 2020-01-01 | End: 2020-01-01 | Stop reason: HOSPADM

## 2020-01-01 RX ORDER — CIPROFLOXACIN 750 MG/1
750 TABLET, FILM COATED ORAL EVERY 12 HOURS
Qty: 10 TABLET | Refills: 0 | Status: SHIPPED | OUTPATIENT
Start: 2020-01-01 | End: 2020-01-01

## 2020-01-01 RX ORDER — HEPARIN 100 UNIT/ML
500 SYRINGE INTRAVENOUS
Status: CANCELLED | OUTPATIENT
Start: 2020-01-01

## 2020-01-01 RX ORDER — PROPOFOL 10 MG/ML
VIAL (ML) INTRAVENOUS
Status: DISCONTINUED | OUTPATIENT
Start: 2020-01-01 | End: 2020-01-01

## 2020-01-01 RX ORDER — MYCOPHENOLATE MOFETIL 250 MG/1
750 CAPSULE ORAL 2 TIMES DAILY
Qty: 180 CAPSULE | Refills: 11 | Status: ON HOLD
Start: 2020-01-01 | End: 2020-01-01 | Stop reason: SDUPTHER

## 2020-01-01 RX ORDER — OXYCODONE HYDROCHLORIDE 5 MG/1
10 TABLET ORAL ONCE AS NEEDED
Status: COMPLETED | OUTPATIENT
Start: 2020-01-01 | End: 2020-01-01

## 2020-01-01 RX ORDER — SODIUM CHLORIDE 9 MG/ML
INJECTION, SOLUTION INTRAVENOUS CONTINUOUS
Status: DISCONTINUED | OUTPATIENT
Start: 2020-01-01 | End: 2020-01-01

## 2020-01-01 RX ORDER — TACROLIMUS 1 MG/1
CAPSULE ORAL
Qty: 150 CAPSULE | Refills: 11 | Status: SHIPPED | OUTPATIENT
Start: 2020-01-01

## 2020-01-01 RX ORDER — POTASSIUM CHLORIDE 20 MEQ/1
20 TABLET, EXTENDED RELEASE ORAL 2 TIMES DAILY
Status: DISCONTINUED | OUTPATIENT
Start: 2020-01-01 | End: 2020-01-01

## 2020-01-01 RX ORDER — HEPARIN 100 UNIT/ML
500 SYRINGE INTRAVENOUS
Status: DISCONTINUED | OUTPATIENT
Start: 2020-01-01 | End: 2020-01-01 | Stop reason: HOSPADM

## 2020-01-01 RX ORDER — ACETAMINOPHEN 325 MG/1
650 TABLET ORAL EVERY 4 HOURS PRN
Status: DISCONTINUED | OUTPATIENT
Start: 2020-01-01 | End: 2020-01-01 | Stop reason: HOSPADM

## 2020-01-01 RX ORDER — POTASSIUM CHLORIDE 7.45 MG/ML
60 INJECTION INTRAVENOUS
Status: DISCONTINUED | OUTPATIENT
Start: 2020-01-01 | End: 2020-01-01

## 2020-01-01 RX ORDER — MIDODRINE HYDROCHLORIDE 2.5 MG/1
10 TABLET ORAL 3 TIMES DAILY
Status: DISCONTINUED | OUTPATIENT
Start: 2020-01-01 | End: 2020-01-01

## 2020-01-01 RX ORDER — SODIUM CHLORIDE 0.9 % (FLUSH) 0.9 %
3 SYRINGE (ML) INJECTION
Status: DISCONTINUED | OUTPATIENT
Start: 2020-01-01 | End: 2020-01-01 | Stop reason: HOSPADM

## 2020-01-01 RX ORDER — HYDROMORPHONE HYDROCHLORIDE 1 MG/ML
0.2 INJECTION, SOLUTION INTRAMUSCULAR; INTRAVENOUS; SUBCUTANEOUS EVERY 5 MIN PRN
Status: DISCONTINUED | OUTPATIENT
Start: 2020-01-01 | End: 2020-01-01 | Stop reason: HOSPADM

## 2020-01-01 RX ORDER — METRONIDAZOLE 500 MG/100ML
500 INJECTION, SOLUTION INTRAVENOUS
Status: DISCONTINUED | OUTPATIENT
Start: 2020-01-01 | End: 2020-01-01

## 2020-01-01 RX ORDER — CHOLESTYRAMINE 4 G/4.8G
1 POWDER, FOR SUSPENSION ORAL 2 TIMES DAILY
Status: COMPLETED | OUTPATIENT
Start: 2020-01-01 | End: 2020-01-01

## 2020-01-01 RX ORDER — METOPROLOL TARTRATE 25 MG/1
TABLET, FILM COATED ORAL
Qty: 90 TABLET | Refills: 3 | Status: SHIPPED | OUTPATIENT
Start: 2020-01-01 | End: 2020-01-01 | Stop reason: CLARIF

## 2020-01-01 RX ORDER — MIDAZOLAM HYDROCHLORIDE 1 MG/ML
1 INJECTION INTRAMUSCULAR; INTRAVENOUS
Status: CANCELLED | OUTPATIENT
Start: 2020-01-01

## 2020-01-01 RX ORDER — ACETAMINOPHEN 500 MG
1000 TABLET ORAL
Status: COMPLETED | OUTPATIENT
Start: 2020-01-01 | End: 2020-01-01

## 2020-01-01 RX ORDER — SODIUM CHLORIDE 9 MG/ML
INJECTION, SOLUTION INTRAVENOUS CONTINUOUS PRN
Status: DISCONTINUED | OUTPATIENT
Start: 2020-01-01 | End: 2020-01-01

## 2020-01-01 RX ORDER — SODIUM CHLORIDE 9 MG/ML
INJECTION, SOLUTION INTRAVENOUS CONTINUOUS
Status: DISCONTINUED | OUTPATIENT
Start: 2020-01-01 | End: 2020-01-01 | Stop reason: HOSPADM

## 2020-01-01 RX ORDER — PREDNISONE 5 MG/1
TABLET ORAL
Qty: 90 TABLET | Refills: 3 | Status: SHIPPED | OUTPATIENT
Start: 2020-01-01 | End: 2020-01-01 | Stop reason: ALTCHOICE

## 2020-01-01 RX ORDER — TRAVOPROST OPHTHALMIC SOLUTION 0.04 MG/ML
1 SOLUTION OPHTHALMIC NIGHTLY
COMMUNITY
Start: 2020-01-01

## 2020-01-01 RX ORDER — ROCURONIUM BROMIDE 10 MG/ML
INJECTION, SOLUTION INTRAVENOUS
Status: DISCONTINUED | OUTPATIENT
Start: 2020-01-01 | End: 2020-01-01

## 2020-01-01 RX ORDER — VANCOMYCIN HYDROCHLORIDE 500 MG/100ML
500 INJECTION, SOLUTION INTRAVENOUS ONCE
Status: COMPLETED | OUTPATIENT
Start: 2020-01-01 | End: 2020-01-01

## 2020-01-01 RX ORDER — ALBUTEROL SULFATE 2.5 MG/.5ML
SOLUTION RESPIRATORY (INHALATION)
Status: COMPLETED
Start: 2020-01-01 | End: 2020-01-01

## 2020-01-01 RX ORDER — IBUPROFEN 200 MG
24 TABLET ORAL
Status: DISCONTINUED | OUTPATIENT
Start: 2020-01-01 | End: 2020-01-01 | Stop reason: HOSPADM

## 2020-01-01 RX ORDER — POTASSIUM CHLORIDE 20 MEQ/1
40 TABLET, EXTENDED RELEASE ORAL
Status: COMPLETED | OUTPATIENT
Start: 2020-01-01 | End: 2020-01-01

## 2020-01-01 RX ORDER — DICYCLOMINE HYDROCHLORIDE 10 MG/1
10 CAPSULE ORAL 4 TIMES DAILY PRN
Status: DISCONTINUED | OUTPATIENT
Start: 2020-01-01 | End: 2020-01-01 | Stop reason: HOSPADM

## 2020-01-01 RX ORDER — CEFEPIME HYDROCHLORIDE 1 G/50ML
1 INJECTION, SOLUTION INTRAVENOUS
Status: DISCONTINUED | OUTPATIENT
Start: 2020-01-01 | End: 2020-01-01

## 2020-01-01 RX ORDER — HYDROMORPHONE HYDROCHLORIDE 2 MG/ML
0.2 INJECTION, SOLUTION INTRAMUSCULAR; INTRAVENOUS; SUBCUTANEOUS EVERY 5 MIN PRN
Status: DISCONTINUED | OUTPATIENT
Start: 2020-01-01 | End: 2020-01-01

## 2020-01-01 RX ORDER — CALCIUM GLUCONATE 20 MG/ML
1 INJECTION, SOLUTION INTRAVENOUS
Status: COMPLETED | OUTPATIENT
Start: 2020-01-01 | End: 2020-01-01

## 2020-01-01 RX ORDER — PREDNISONE 50 MG/1
100 TABLET ORAL DAILY
Qty: 8 TABLET | Refills: 0 | Status: SHIPPED | OUTPATIENT
Start: 2020-01-01

## 2020-01-01 RX ORDER — LIDOCAINE HYDROCHLORIDE 10 MG/ML
INJECTION, SOLUTION EPIDURAL; INFILTRATION; INTRACAUDAL; PERINEURAL
Status: DISCONTINUED | OUTPATIENT
Start: 2020-01-01 | End: 2020-01-01 | Stop reason: HOSPADM

## 2020-01-01 RX ORDER — METRONIDAZOLE 500 MG/100ML
500 INJECTION, SOLUTION INTRAVENOUS
Status: COMPLETED | OUTPATIENT
Start: 2020-01-01 | End: 2020-01-01

## 2020-01-01 RX ORDER — FAMOTIDINE 20 MG/1
TABLET, FILM COATED ORAL
Qty: 90 TABLET | Refills: 3 | Status: SHIPPED | OUTPATIENT
Start: 2020-01-01

## 2020-01-01 RX ORDER — TRAVOPROST OPHTHALMIC SOLUTION 0.04 MG/ML
1 SOLUTION OPHTHALMIC NIGHTLY
Status: DISCONTINUED | OUTPATIENT
Start: 2020-01-01 | End: 2020-01-01 | Stop reason: HOSPADM

## 2020-01-01 RX ORDER — POTASSIUM CHLORIDE 20 MEQ/1
20 TABLET, EXTENDED RELEASE ORAL 3 TIMES DAILY
Status: DISCONTINUED | OUTPATIENT
Start: 2020-01-01 | End: 2020-01-01

## 2020-01-01 RX ORDER — LIDOCAINE HCL/PF 100 MG/5ML
SYRINGE (ML) INTRAVENOUS
Status: DISCONTINUED | OUTPATIENT
Start: 2020-01-01 | End: 2020-01-01

## 2020-01-01 RX ORDER — MUPIROCIN 20 MG/G
OINTMENT TOPICAL DAILY
Status: DISCONTINUED | OUTPATIENT
Start: 2020-01-01 | End: 2020-01-01 | Stop reason: HOSPADM

## 2020-01-01 RX ORDER — LEVOTHYROXINE SODIUM 100 UG/1
100 TABLET ORAL
Status: DISCONTINUED | OUTPATIENT
Start: 2020-01-01 | End: 2020-01-01 | Stop reason: HOSPADM

## 2020-01-01 RX ORDER — METOCLOPRAMIDE HYDROCHLORIDE 5 MG/ML
10 INJECTION INTRAMUSCULAR; INTRAVENOUS EVERY 6 HOURS PRN
Status: DISCONTINUED | OUTPATIENT
Start: 2020-01-01 | End: 2020-01-01 | Stop reason: HOSPADM

## 2020-01-01 RX ORDER — ALBUMIN HUMAN 250 G/1000ML
25 SOLUTION INTRAVENOUS ONCE
Status: DISCONTINUED | OUTPATIENT
Start: 2020-01-01 | End: 2020-01-01

## 2020-01-01 RX ORDER — BRIMONIDINE TARTRATE, TIMOLOL MALEATE 2; 5 MG/ML; MG/ML
1 SOLUTION/ DROPS OPHTHALMIC 2 TIMES DAILY
COMMUNITY
Start: 2020-01-01

## 2020-01-01 RX ORDER — CIPROFLOXACIN 750 MG/1
750 TABLET, FILM COATED ORAL EVERY 12 HOURS
Qty: 8 TABLET | Refills: 0 | Status: SHIPPED | OUTPATIENT
Start: 2020-01-01 | End: 2020-01-01

## 2020-01-01 RX ORDER — CEFEPIME HYDROCHLORIDE 1 G/1
1 INJECTION, POWDER, FOR SOLUTION INTRAMUSCULAR; INTRAVENOUS
Status: COMPLETED | OUTPATIENT
Start: 2020-01-01 | End: 2020-01-01

## 2020-01-01 RX ORDER — CIPROFLOXACIN 500 MG/1
500 TABLET ORAL
Status: COMPLETED | OUTPATIENT
Start: 2020-01-01 | End: 2020-01-01

## 2020-01-01 RX ORDER — MYCOPHENOLATE MOFETIL 250 MG/1
750 CAPSULE ORAL 2 TIMES DAILY
Qty: 180 CAPSULE | Refills: 11 | Status: SHIPPED | OUTPATIENT
Start: 2020-01-01 | End: 2020-01-01

## 2020-01-01 RX ORDER — VANCOMYCIN HYDROCHLORIDE 500 MG/100ML
500 INJECTION, SOLUTION INTRAVENOUS ONCE
Status: DISCONTINUED | OUTPATIENT
Start: 2020-01-01 | End: 2020-01-01

## 2020-01-01 RX ORDER — LIDOCAINE HYDROCHLORIDE 10 MG/ML
10 INJECTION INFILTRATION; PERINEURAL
Status: COMPLETED | OUTPATIENT
Start: 2020-01-01 | End: 2020-01-01

## 2020-01-01 RX ORDER — CIPROFLOXACIN 500 MG/1
500 TABLET ORAL 2 TIMES DAILY
Qty: 14 TABLET | Refills: 0 | Status: SHIPPED | OUTPATIENT
Start: 2020-01-01 | End: 2020-01-01

## 2020-01-01 RX ORDER — LEVOTHYROXINE SODIUM 100 UG/1
100 TABLET ORAL
Status: DISCONTINUED | OUTPATIENT
Start: 2020-01-01 | End: 2020-01-01

## 2020-01-01 RX ORDER — PREDNISONE 50 MG/1
100 TABLET ORAL DAILY
Qty: 8 TABLET | Refills: 5 | Status: SHIPPED | OUTPATIENT
Start: 2020-01-01 | End: 2020-01-01 | Stop reason: SDUPTHER

## 2020-01-01 RX ORDER — SODIUM CHLORIDE 0.9 % (FLUSH) 0.9 %
10 SYRINGE (ML) INJECTION
Status: CANCELLED | OUTPATIENT
Start: 2020-01-01

## 2020-01-01 RX ORDER — DOXYCYCLINE HYCLATE 100 MG
100 TABLET ORAL ONCE
Status: CANCELLED | OUTPATIENT
Start: 2020-01-01 | End: 2020-01-01

## 2020-01-01 RX ORDER — ALLOPURINOL 100 MG/1
300 TABLET ORAL DAILY
Status: DISCONTINUED | OUTPATIENT
Start: 2020-01-01 | End: 2020-01-01

## 2020-01-01 RX ORDER — METRONIDAZOLE 500 MG/1
500 TABLET ORAL EVERY 8 HOURS
Status: DISCONTINUED | OUTPATIENT
Start: 2020-01-01 | End: 2020-01-01 | Stop reason: HOSPADM

## 2020-01-01 RX ORDER — CIPROFLOXACIN 2 MG/ML
400 INJECTION, SOLUTION INTRAVENOUS
Status: COMPLETED | OUTPATIENT
Start: 2020-01-01 | End: 2020-01-01

## 2020-01-01 RX ORDER — OXYCODONE HYDROCHLORIDE 5 MG/1
5 TABLET ORAL EVERY 8 HOURS PRN
Status: DISCONTINUED | OUTPATIENT
Start: 2020-01-01 | End: 2020-01-01

## 2020-01-01 RX ORDER — ETOMIDATE 2 MG/ML
INJECTION INTRAVENOUS
Status: DISCONTINUED | OUTPATIENT
Start: 2020-01-01 | End: 2020-01-01

## 2020-01-01 RX ORDER — TAMSULOSIN HYDROCHLORIDE 0.4 MG/1
0.4 CAPSULE ORAL ONCE
Status: CANCELLED | OUTPATIENT
Start: 2020-01-01 | End: 2020-01-01

## 2020-01-01 RX ORDER — SUCRALFATE 1 G/10ML
1 SUSPENSION ORAL
Status: DISCONTINUED | OUTPATIENT
Start: 2020-01-01 | End: 2020-01-01 | Stop reason: HOSPADM

## 2020-01-01 RX ORDER — PROPOFOL 10 MG/ML
VIAL (ML) INTRAVENOUS CONTINUOUS PRN
Status: DISCONTINUED | OUTPATIENT
Start: 2020-01-01 | End: 2020-01-01

## 2020-01-01 RX ORDER — CIPROFLOXACIN 500 MG/1
750 TABLET ORAL EVERY 12 HOURS
Qty: 6 TABLET | Refills: 0 | Status: SHIPPED | OUTPATIENT
Start: 2020-01-01 | End: 2020-01-01

## 2020-01-01 RX ORDER — METOPROLOL TARTRATE 25 MG/1
12.5 TABLET ORAL 2 TIMES DAILY
Status: DISCONTINUED | OUTPATIENT
Start: 2020-01-01 | End: 2020-01-01

## 2020-01-01 RX ORDER — CEPHALEXIN 500 MG/1
500 CAPSULE ORAL EVERY 8 HOURS
Qty: 10 CAPSULE | Refills: 0 | Status: SHIPPED | OUTPATIENT
Start: 2020-01-01 | End: 2020-01-01

## 2020-01-01 RX ORDER — SODIUM BICARBONATE 1 MEQ/ML
SYRINGE (ML) INTRAVENOUS CODE/TRAUMA/SEDATION MEDICATION
Status: COMPLETED | OUTPATIENT
Start: 2020-01-01 | End: 2020-01-01

## 2020-01-01 RX ORDER — EPINEPHRINE 0.1 MG/ML
INJECTION INTRAVENOUS CODE/TRAUMA/SEDATION MEDICATION
Status: COMPLETED | OUTPATIENT
Start: 2020-01-01 | End: 2020-01-01

## 2020-01-01 RX ORDER — DEXTROSE MONOHYDRATE 100 MG/ML
12.5 INJECTION, SOLUTION INTRAVENOUS
Status: DISCONTINUED | OUTPATIENT
Start: 2020-01-01 | End: 2020-01-01 | Stop reason: HOSPADM

## 2020-01-01 RX ORDER — INDOMETHACIN 25 MG/1
150 CAPSULE ORAL ONCE
Status: DISCONTINUED | OUTPATIENT
Start: 2020-01-01 | End: 2020-01-01 | Stop reason: HOSPADM

## 2020-01-01 RX ORDER — CIPROFLOXACIN 500 MG/1
500 TABLET ORAL EVERY 12 HOURS
Qty: 11 TABLET | Refills: 0 | Status: SHIPPED | OUTPATIENT
Start: 2020-01-01 | End: 2020-01-01

## 2020-01-01 RX ORDER — POTASSIUM CHLORIDE 750 MG/1
30 CAPSULE, EXTENDED RELEASE ORAL ONCE
Status: COMPLETED | OUTPATIENT
Start: 2020-01-01 | End: 2020-01-01

## 2020-01-01 RX ORDER — METRONIDAZOLE 500 MG/1
500 TABLET ORAL EVERY 8 HOURS
Qty: 12 TABLET | Refills: 0 | Status: SHIPPED | OUTPATIENT
Start: 2020-01-01 | End: 2020-01-01

## 2020-01-01 RX ORDER — LIDOCAINE HYDROCHLORIDE 10 MG/ML
1 INJECTION, SOLUTION EPIDURAL; INFILTRATION; INTRACAUDAL; PERINEURAL ONCE
Status: DISCONTINUED | OUTPATIENT
Start: 2020-01-01 | End: 2020-01-01 | Stop reason: HOSPADM

## 2020-01-01 RX ORDER — MORPHINE SULFATE 2 MG/ML
2 INJECTION, SOLUTION INTRAMUSCULAR; INTRAVENOUS EVERY 6 HOURS PRN
Status: DISCONTINUED | OUTPATIENT
Start: 2020-01-01 | End: 2020-01-01

## 2020-01-01 RX ORDER — BUPIVACAINE HYDROCHLORIDE AND EPINEPHRINE 2.5; 5 MG/ML; UG/ML
INJECTION, SOLUTION EPIDURAL; INFILTRATION; INTRACAUDAL; PERINEURAL
Status: DISCONTINUED | OUTPATIENT
Start: 2020-01-01 | End: 2020-01-01 | Stop reason: HOSPADM

## 2020-01-01 RX ORDER — DIPHENHYDRAMINE HYDROCHLORIDE 50 MG/ML
25 INJECTION INTRAMUSCULAR; INTRAVENOUS EVERY 6 HOURS PRN
Status: DISCONTINUED | OUTPATIENT
Start: 2020-01-01 | End: 2020-01-01 | Stop reason: HOSPADM

## 2020-01-01 RX ORDER — VANCOMYCIN HCL IN 5 % DEXTROSE 1G/250ML
1000 PLASTIC BAG, INJECTION (ML) INTRAVENOUS ONCE
Status: COMPLETED | OUTPATIENT
Start: 2020-01-01 | End: 2020-01-01

## 2020-01-01 RX ORDER — ONDANSETRON 8 MG/1
8 TABLET, ORALLY DISINTEGRATING ORAL EVERY 8 HOURS PRN
Status: DISCONTINUED | OUTPATIENT
Start: 2020-01-01 | End: 2020-01-01 | Stop reason: HOSPADM

## 2020-01-01 RX ORDER — MAGNESIUM SULFATE HEPTAHYDRATE 40 MG/ML
4 INJECTION, SOLUTION INTRAVENOUS
Status: DISCONTINUED | OUTPATIENT
Start: 2020-01-01 | End: 2020-01-01

## 2020-01-01 RX ORDER — SODIUM CHLORIDE 9 MG/ML
500 INJECTION, SOLUTION INTRAVENOUS ONCE
Status: COMPLETED | OUTPATIENT
Start: 2020-01-01 | End: 2020-01-01

## 2020-01-01 RX ORDER — BUPIVACAINE HYDROCHLORIDE 2.5 MG/ML
INJECTION, SOLUTION EPIDURAL; INFILTRATION; INTRACAUDAL
Status: DISCONTINUED | OUTPATIENT
Start: 2020-01-01 | End: 2020-01-01 | Stop reason: HOSPADM

## 2020-01-01 RX ORDER — NOREPINEPHRINE BITARTRATE/D5W 8 MG/250ML
0.02 PLASTIC BAG, INJECTION (ML) INTRAVENOUS CONTINUOUS
Status: DISCONTINUED | OUTPATIENT
Start: 2020-01-01 | End: 2020-01-01 | Stop reason: HOSPADM

## 2020-01-01 RX ORDER — GLUCAGON 1 MG
1 KIT INJECTION
Status: DISCONTINUED | OUTPATIENT
Start: 2020-01-01 | End: 2020-01-01 | Stop reason: HOSPADM

## 2020-01-01 RX ORDER — FENTANYL CITRATE 50 UG/ML
INJECTION, SOLUTION INTRAMUSCULAR; INTRAVENOUS CODE/TRAUMA/SEDATION MEDICATION
Status: COMPLETED | OUTPATIENT
Start: 2020-01-01 | End: 2020-01-01

## 2020-01-01 RX ORDER — ONDANSETRON 2 MG/ML
4 INJECTION INTRAMUSCULAR; INTRAVENOUS DAILY PRN
Status: DISCONTINUED | OUTPATIENT
Start: 2020-01-01 | End: 2020-01-01

## 2020-01-01 RX ORDER — SODIUM BICARBONATE 650 MG/1
1300 TABLET ORAL 2 TIMES DAILY
Status: DISCONTINUED | OUTPATIENT
Start: 2020-01-01 | End: 2020-01-01

## 2020-01-01 RX ORDER — CALCITRIOL 0.25 UG/1
0.5 CAPSULE ORAL DAILY
Status: DISCONTINUED | OUTPATIENT
Start: 2020-01-01 | End: 2020-01-01 | Stop reason: HOSPADM

## 2020-01-01 RX ORDER — MORPHINE SULFATE 2 MG/ML
2 INJECTION, SOLUTION INTRAMUSCULAR; INTRAVENOUS EVERY 4 HOURS PRN
Status: DISCONTINUED | OUTPATIENT
Start: 2020-01-01 | End: 2020-01-01

## 2020-01-01 RX ORDER — SODIUM,POTASSIUM PHOSPHATES 280-250MG
2 POWDER IN PACKET (EA) ORAL EVERY 4 HOURS
Status: COMPLETED | OUTPATIENT
Start: 2020-01-01 | End: 2020-01-01

## 2020-01-01 RX ORDER — MAGNESIUM SULFATE HEPTAHYDRATE 40 MG/ML
2 INJECTION, SOLUTION INTRAVENOUS
Status: DISCONTINUED | OUTPATIENT
Start: 2020-01-01 | End: 2020-01-01

## 2020-01-01 RX ORDER — ALLOPURINOL 300 MG/1
300 TABLET ORAL DAILY
Qty: 30 TABLET | Refills: 2 | Status: SHIPPED | OUTPATIENT
Start: 2020-01-01 | End: 2021-07-28

## 2020-01-01 RX ORDER — LIDOCAINE HYDROCHLORIDE 10 MG/ML
1 INJECTION, SOLUTION EPIDURAL; INFILTRATION; INTRACAUDAL; PERINEURAL ONCE
Status: CANCELLED | OUTPATIENT
Start: 2020-01-01 | End: 2020-01-01

## 2020-01-01 RX ORDER — HEPARIN SODIUM 1000 [USP'U]/ML
3000 INJECTION, SOLUTION INTRAVENOUS; SUBCUTANEOUS
Status: DISCONTINUED | OUTPATIENT
Start: 2020-01-01 | End: 2020-01-01

## 2020-01-01 RX ORDER — METRONIDAZOLE 500 MG/1
500 TABLET ORAL EVERY 8 HOURS
Qty: 9 TABLET | Refills: 0 | Status: SHIPPED | OUTPATIENT
Start: 2020-01-01 | End: 2020-01-01

## 2020-01-01 RX ORDER — CIPROFLOXACIN 500 MG/1
500 TABLET ORAL 2 TIMES DAILY
Qty: 20 TABLET | Refills: 0 | Status: SHIPPED | OUTPATIENT
Start: 2020-01-01 | End: 2020-01-01

## 2020-01-01 RX ORDER — ONDANSETRON 2 MG/ML
4 INJECTION INTRAMUSCULAR; INTRAVENOUS DAILY PRN
Status: DISCONTINUED | OUTPATIENT
Start: 2020-01-01 | End: 2020-01-01 | Stop reason: HOSPADM

## 2020-01-01 RX ORDER — FAMOTIDINE 10 MG/ML
20 INJECTION INTRAVENOUS
Status: CANCELLED | OUTPATIENT
Start: 2020-01-01

## 2020-01-01 RX ORDER — METRONIDAZOLE 500 MG/100ML
500 INJECTION, SOLUTION INTRAVENOUS
Status: DISCONTINUED | OUTPATIENT
Start: 2020-01-01 | End: 2020-01-01 | Stop reason: SDUPTHER

## 2020-01-01 RX ORDER — CEFEPIME HYDROCHLORIDE 1 G/50ML
2 INJECTION, SOLUTION INTRAVENOUS
Status: COMPLETED | OUTPATIENT
Start: 2020-01-01 | End: 2020-01-01

## 2020-01-01 RX ORDER — TACROLIMUS 1 MG/1
2 CAPSULE ORAL EVERY MORNING
Status: DISCONTINUED | OUTPATIENT
Start: 2020-01-01 | End: 2020-01-01

## 2020-01-01 RX ORDER — ASPIRIN 81 MG/1
81 TABLET ORAL DAILY
Status: DISCONTINUED | OUTPATIENT
Start: 2020-01-01 | End: 2020-01-01

## 2020-01-01 RX ORDER — CHOLESTYRAMINE 4 G/4.8G
1 POWDER, FOR SUSPENSION ORAL 2 TIMES DAILY
Status: DISCONTINUED | OUTPATIENT
Start: 2020-01-01 | End: 2020-01-01

## 2020-01-01 RX ORDER — CEFEPIME HYDROCHLORIDE 1 G/50ML
1 INJECTION, SOLUTION INTRAVENOUS
Status: DISCONTINUED | OUTPATIENT
Start: 2020-01-01 | End: 2020-01-01 | Stop reason: HOSPADM

## 2020-01-01 RX ORDER — MIDAZOLAM HYDROCHLORIDE 1 MG/ML
INJECTION, SOLUTION INTRAMUSCULAR; INTRAVENOUS
Status: DISCONTINUED | OUTPATIENT
Start: 2020-01-01 | End: 2020-01-01

## 2020-01-01 RX ORDER — LEVOFLOXACIN 500 MG/1
500 TABLET, FILM COATED ORAL DAILY
Qty: 30 TABLET | Refills: 2 | Status: SHIPPED | OUTPATIENT
Start: 2020-01-01 | End: 2020-01-01

## 2020-01-01 RX ORDER — ALBUMIN HUMAN 250 G/1000ML
25 SOLUTION INTRAVENOUS ONCE
Status: DISCONTINUED | OUTPATIENT
Start: 2020-01-01 | End: 2020-01-01 | Stop reason: HOSPADM

## 2020-01-01 RX ORDER — CEFAZOLIN SODIUM 1 G/3ML
2 INJECTION, POWDER, FOR SOLUTION INTRAMUSCULAR; INTRAVENOUS
Status: DISCONTINUED | OUTPATIENT
Start: 2020-01-01 | End: 2020-01-01 | Stop reason: HOSPADM

## 2020-01-01 RX ORDER — FAMOTIDINE 40 MG/5ML
20 POWDER, FOR SUSPENSION ORAL NIGHTLY
Status: DISCONTINUED | OUTPATIENT
Start: 2020-01-01 | End: 2020-01-01 | Stop reason: HOSPADM

## 2020-01-01 RX ORDER — TALC
6 POWDER (GRAM) TOPICAL NIGHTLY PRN
Status: DISCONTINUED | OUTPATIENT
Start: 2020-01-01 | End: 2020-01-01 | Stop reason: HOSPADM

## 2020-01-01 RX ORDER — EPHEDRINE SULFATE 50 MG/ML
INJECTION, SOLUTION INTRAVENOUS
Status: DISCONTINUED | OUTPATIENT
Start: 2020-01-01 | End: 2020-01-01

## 2020-01-01 RX ORDER — ONDANSETRON 4 MG/1
8 TABLET, ORALLY DISINTEGRATING ORAL EVERY 6 HOURS PRN
Status: DISCONTINUED | OUTPATIENT
Start: 2020-01-01 | End: 2020-01-01

## 2020-01-01 RX ORDER — SODIUM,POTASSIUM PHOSPHATES 280-250MG
1 POWDER IN PACKET (EA) ORAL EVERY 4 HOURS
Status: DISPENSED | OUTPATIENT
Start: 2020-01-01 | End: 2020-01-01

## 2020-01-01 RX ORDER — LEVOTHYROXINE SODIUM 100 UG/1
TABLET ORAL
Qty: 90 TABLET | Refills: 3 | Status: SHIPPED | OUTPATIENT
Start: 2020-01-01

## 2020-01-01 RX ORDER — MIDODRINE HYDROCHLORIDE 2.5 MG/1
5 TABLET ORAL 3 TIMES DAILY
Status: DISCONTINUED | OUTPATIENT
Start: 2020-01-01 | End: 2020-01-01

## 2020-01-01 RX ORDER — PROMETHAZINE HYDROCHLORIDE 12.5 MG/1
12.5 TABLET ORAL EVERY 6 HOURS PRN
Qty: 30 TABLET | Refills: 1 | Status: SHIPPED | OUTPATIENT
Start: 2020-01-01 | End: 2020-01-01 | Stop reason: CLARIF

## 2020-01-01 RX ORDER — POTASSIUM CHLORIDE 20 MEQ/1
40 TABLET, EXTENDED RELEASE ORAL 2 TIMES DAILY
Status: DISCONTINUED | OUTPATIENT
Start: 2020-01-01 | End: 2020-01-01 | Stop reason: HOSPADM

## 2020-01-01 RX ORDER — SODIUM CHLORIDE, SODIUM LACTATE, POTASSIUM CHLORIDE, CALCIUM CHLORIDE 600; 310; 30; 20 MG/100ML; MG/100ML; MG/100ML; MG/100ML
75 INJECTION, SOLUTION INTRAVENOUS CONTINUOUS
Status: DISCONTINUED | OUTPATIENT
Start: 2020-01-01 | End: 2020-01-01

## 2020-01-01 RX ORDER — ASPIRIN 81 MG/1
81 TABLET ORAL DAILY
Status: DISCONTINUED | OUTPATIENT
Start: 2020-01-01 | End: 2020-01-01 | Stop reason: HOSPADM

## 2020-01-01 RX ORDER — HYDROCODONE BITARTRATE AND ACETAMINOPHEN 5; 325 MG/1; MG/1
1 TABLET ORAL EVERY 6 HOURS PRN
Qty: 5 TABLET | Refills: 0 | Status: SHIPPED | OUTPATIENT
Start: 2020-01-01 | End: 2020-01-01 | Stop reason: CLARIF

## 2020-01-01 RX ORDER — ONDANSETRON 8 MG/1
8 TABLET, ORALLY DISINTEGRATING ORAL EVERY 12 HOURS PRN
Qty: 21 TABLET | Refills: 1 | Status: SHIPPED | OUTPATIENT
Start: 2020-01-01

## 2020-01-01 RX ORDER — FENTANYL CITRATE 50 UG/ML
50 INJECTION, SOLUTION INTRAMUSCULAR; INTRAVENOUS
Status: CANCELLED | OUTPATIENT
Start: 2020-01-01

## 2020-01-01 RX ORDER — ALBUTEROL SULFATE 2.5 MG/.5ML
10 SOLUTION RESPIRATORY (INHALATION) ONCE
Status: COMPLETED | OUTPATIENT
Start: 2020-01-01 | End: 2020-01-01

## 2020-01-01 RX ORDER — LANOLIN ALCOHOL/MO/W.PET/CERES
400 CREAM (GRAM) TOPICAL DAILY
Status: DISCONTINUED | OUTPATIENT
Start: 2020-01-01 | End: 2020-01-01 | Stop reason: HOSPADM

## 2020-01-01 RX ORDER — INDOMETHACIN 25 MG/1
50 CAPSULE ORAL ONCE
Status: COMPLETED | OUTPATIENT
Start: 2020-01-01 | End: 2020-01-01

## 2020-01-01 RX ORDER — TACROLIMUS 0.5 MG/1
2 CAPSULE ORAL EVERY MORNING
Status: DISCONTINUED | OUTPATIENT
Start: 2020-01-01 | End: 2020-01-01 | Stop reason: HOSPADM

## 2020-01-01 RX ORDER — VANCOMYCIN 1.75 GRAM/500 ML IN 0.9 % SODIUM CHLORIDE INTRAVENOUS
1750
Status: COMPLETED | OUTPATIENT
Start: 2020-01-01 | End: 2020-01-01

## 2020-01-01 RX ORDER — CEFEPIME HYDROCHLORIDE 1 G/50ML
2 INJECTION, SOLUTION INTRAVENOUS
Status: DISCONTINUED | OUTPATIENT
Start: 2020-01-01 | End: 2020-01-01

## 2020-01-01 RX ORDER — KETAMINE HYDROCHLORIDE 100 MG/ML
INJECTION, SOLUTION INTRAMUSCULAR; INTRAVENOUS
Status: DISCONTINUED | OUTPATIENT
Start: 2020-01-01 | End: 2020-01-01

## 2020-01-01 RX ORDER — LEVOTHYROXINE SODIUM ANHYDROUS 100 UG/5ML
50 INJECTION, POWDER, LYOPHILIZED, FOR SOLUTION INTRAVENOUS DAILY
Status: DISCONTINUED | OUTPATIENT
Start: 2020-01-01 | End: 2020-01-01 | Stop reason: HOSPADM

## 2020-01-01 RX ORDER — METRONIDAZOLE 500 MG/100ML
500 INJECTION, SOLUTION INTRAVENOUS
Status: CANCELLED | OUTPATIENT
Start: 2020-01-01

## 2020-01-01 RX ORDER — SODIUM BICARBONATE 650 MG/1
650 TABLET ORAL 3 TIMES DAILY
Status: DISCONTINUED | OUTPATIENT
Start: 2020-01-01 | End: 2020-01-01

## 2020-01-01 RX ORDER — LIDOCAINE HYDROCHLORIDE 20 MG/ML
JELLY TOPICAL ONCE
Status: COMPLETED | OUTPATIENT
Start: 2020-01-01 | End: 2020-01-01

## 2020-01-01 RX ORDER — TACROLIMUS 1 MG/1
3 CAPSULE ORAL EVERY MORNING
Status: DISCONTINUED | OUTPATIENT
Start: 2020-01-01 | End: 2020-01-01

## 2020-01-01 RX ORDER — CEFTRIAXONE 1 G/1
2 INJECTION, POWDER, FOR SOLUTION INTRAMUSCULAR; INTRAVENOUS DAILY
Qty: 24 G | Refills: 0
Start: 2020-01-01 | End: 2020-01-01

## 2020-01-01 RX ORDER — LEVOTHYROXINE SODIUM 100 UG/1
100 TABLET ORAL DAILY
Status: DISCONTINUED | OUTPATIENT
Start: 2020-01-01 | End: 2020-01-01

## 2020-01-01 RX ORDER — FAMOTIDINE 20 MG/1
20 TABLET, FILM COATED ORAL NIGHTLY
Status: DISCONTINUED | OUTPATIENT
Start: 2020-01-01 | End: 2020-01-01

## 2020-01-01 RX ORDER — ACETAMINOPHEN 10 MG/ML
INJECTION, SOLUTION INTRAVENOUS
Status: DISCONTINUED | OUTPATIENT
Start: 2020-01-01 | End: 2020-01-01

## 2020-01-01 RX ORDER — OXYCODONE HYDROCHLORIDE 5 MG/1
5 TABLET ORAL EVERY 8 HOURS PRN
Qty: 6 TABLET | Refills: 0 | Status: SHIPPED | OUTPATIENT
Start: 2020-01-01 | End: 2020-01-01 | Stop reason: CLARIF

## 2020-01-01 RX ORDER — LORAZEPAM 0.5 MG/1
0.5 TABLET ORAL ONCE
Qty: 2 TABLET | Refills: 0 | Status: SHIPPED | OUTPATIENT
Start: 2020-01-01 | End: 2020-01-01 | Stop reason: CLARIF

## 2020-01-01 RX ORDER — LIDOCAINE HYDROCHLORIDE 10 MG/ML
1 INJECTION INFILTRATION; PERINEURAL ONCE
Status: COMPLETED | OUTPATIENT
Start: 2020-01-01 | End: 2020-01-01

## 2020-01-01 RX ORDER — OXYCODONE HYDROCHLORIDE 5 MG/1
5 TABLET ORAL
Status: DISCONTINUED | OUTPATIENT
Start: 2020-01-01 | End: 2020-01-01 | Stop reason: HOSPADM

## 2020-01-01 RX ORDER — METHOCARBAMOL 750 MG/1
1500 TABLET, FILM COATED ORAL
Status: COMPLETED | OUTPATIENT
Start: 2020-01-01 | End: 2020-01-01

## 2020-01-01 RX ORDER — PANTOPRAZOLE SODIUM 40 MG/10ML
40 INJECTION, POWDER, LYOPHILIZED, FOR SOLUTION INTRAVENOUS 2 TIMES DAILY
Status: DISCONTINUED | OUTPATIENT
Start: 2020-01-01 | End: 2020-01-01

## 2020-01-01 RX ORDER — LORAZEPAM/0.9% SODIUM CHLORIDE 100MG/0.1L
2 PLASTIC BAG, INJECTION (ML) INTRAVENOUS ONCE
Status: COMPLETED | OUTPATIENT
Start: 2020-01-01 | End: 2020-01-01

## 2020-01-01 RX ORDER — CALCIUM GLUCONATE 20 MG/ML
1 INJECTION, SOLUTION INTRAVENOUS ONCE
Status: DISCONTINUED | OUTPATIENT
Start: 2020-01-01 | End: 2020-01-01 | Stop reason: HOSPADM

## 2020-01-01 RX ORDER — LIDOCAINE HYDROCHLORIDE AND EPINEPHRINE 10; 10 MG/ML; UG/ML
INJECTION, SOLUTION INFILTRATION; PERINEURAL
Status: DISCONTINUED | OUTPATIENT
Start: 2020-01-01 | End: 2020-01-01 | Stop reason: HOSPADM

## 2020-01-01 RX ORDER — AMIODARONE HYDROCHLORIDE 150 MG/3ML
INJECTION, SOLUTION INTRAVENOUS CODE/TRAUMA/SEDATION MEDICATION
Status: COMPLETED | OUTPATIENT
Start: 2020-01-01 | End: 2020-01-01

## 2020-01-01 RX ORDER — ALBUMIN HUMAN 50 G/1000ML
SOLUTION INTRAVENOUS CONTINUOUS PRN
Status: DISCONTINUED | OUTPATIENT
Start: 2020-01-01 | End: 2020-01-01

## 2020-01-01 RX ORDER — IPRATROPIUM BROMIDE AND ALBUTEROL SULFATE 2.5; .5 MG/3ML; MG/3ML
3 SOLUTION RESPIRATORY (INHALATION) EVERY 6 HOURS PRN
Status: DISCONTINUED | OUTPATIENT
Start: 2020-01-01 | End: 2020-01-01 | Stop reason: HOSPADM

## 2020-01-01 RX ORDER — ATROPINE SULFATE 0.1 MG/ML
INJECTION INTRAVENOUS CODE/TRAUMA/SEDATION MEDICATION
Status: COMPLETED | OUTPATIENT
Start: 2020-01-01 | End: 2020-01-01

## 2020-01-01 RX ORDER — DOXYCYCLINE HYCLATE 100 MG
100 TABLET ORAL ONCE
Status: DISCONTINUED | OUTPATIENT
Start: 2020-01-01 | End: 2020-01-01

## 2020-01-01 RX ORDER — MORPHINE SULFATE 2 MG/ML
2 INJECTION, SOLUTION INTRAMUSCULAR; INTRAVENOUS
Status: DISCONTINUED | OUTPATIENT
Start: 2020-01-01 | End: 2020-01-01 | Stop reason: HOSPADM

## 2020-01-01 RX ORDER — BRIMONIDINE TARTRATE AND TIMOLOL MALEATE 2; 5 MG/ML; MG/ML
1 SOLUTION OPHTHALMIC
Status: DISCONTINUED | OUTPATIENT
Start: 2020-01-01 | End: 2020-01-01 | Stop reason: HOSPADM

## 2020-01-01 RX ORDER — MORPHINE SULFATE 4 MG/ML
8 INJECTION, SOLUTION INTRAMUSCULAR; INTRAVENOUS EVERY 4 HOURS PRN
Status: DISCONTINUED | OUTPATIENT
Start: 2020-01-01 | End: 2020-01-01

## 2020-01-01 RX ORDER — CIPROFLOXACIN 750 MG/1
750 TABLET, FILM COATED ORAL EVERY 12 HOURS
Qty: 12 TABLET | Refills: 0 | Status: SHIPPED | OUTPATIENT
Start: 2020-01-01 | End: 2020-01-01

## 2020-01-01 RX ORDER — POTASSIUM CHLORIDE 1.5 G/1.58G
40 POWDER, FOR SOLUTION ORAL
Status: COMPLETED | OUTPATIENT
Start: 2020-01-01 | End: 2020-01-01

## 2020-01-01 RX ORDER — TAMSULOSIN HYDROCHLORIDE 0.4 MG/1
0.4 CAPSULE ORAL ONCE
Status: COMPLETED | OUTPATIENT
Start: 2020-01-01 | End: 2020-01-01

## 2020-01-01 RX ORDER — METOPROLOL TARTRATE 25 MG/1
12.5 TABLET ORAL ONCE
Status: COMPLETED | OUTPATIENT
Start: 2020-01-01 | End: 2020-01-01

## 2020-01-01 RX ORDER — CHOLESTYRAMINE 4 G/4.8G
1 POWDER, FOR SUSPENSION ORAL 3 TIMES DAILY
Status: DISCONTINUED | OUTPATIENT
Start: 2020-01-01 | End: 2020-01-01 | Stop reason: HOSPADM

## 2020-01-01 RX ORDER — DICYCLOMINE HYDROCHLORIDE 10 MG/1
10 CAPSULE ORAL 4 TIMES DAILY
Status: DISCONTINUED | OUTPATIENT
Start: 2020-01-01 | End: 2020-01-01

## 2020-01-01 RX ORDER — FAMOTIDINE 40 MG/5ML
20 POWDER, FOR SUSPENSION ORAL NIGHTLY
Status: DISCONTINUED | OUTPATIENT
Start: 2020-01-01 | End: 2020-01-01

## 2020-01-01 RX ORDER — POTASSIUM CHLORIDE 29.8 MG/ML
40 INJECTION INTRAVENOUS ONCE
Status: COMPLETED | OUTPATIENT
Start: 2020-01-01 | End: 2020-01-01

## 2020-01-01 RX ORDER — VANCOMYCIN HCL IN 5 % DEXTROSE 1G/250ML
15 PLASTIC BAG, INJECTION (ML) INTRAVENOUS
Status: DISCONTINUED | OUTPATIENT
Start: 2020-01-01 | End: 2020-01-01

## 2020-01-01 RX ORDER — CEFAZOLIN SODIUM 1 G/3ML
INJECTION, POWDER, FOR SOLUTION INTRAMUSCULAR; INTRAVENOUS
Status: DISCONTINUED | OUTPATIENT
Start: 2020-01-01 | End: 2020-01-01

## 2020-01-01 RX ORDER — TACROLIMUS 0.5 MG/1
2 CAPSULE ORAL ONCE
Status: COMPLETED | OUTPATIENT
Start: 2020-01-01 | End: 2020-01-01

## 2020-01-01 RX ORDER — PHENAZOPYRIDINE HYDROCHLORIDE 200 MG/1
200 TABLET, FILM COATED ORAL 3 TIMES DAILY PRN
Qty: 20 TABLET | Refills: 0 | Status: SHIPPED | OUTPATIENT
Start: 2020-01-01 | End: 2020-01-01 | Stop reason: CLARIF

## 2020-01-01 RX ORDER — FAMOTIDINE 20 MG/1
20 TABLET, FILM COATED ORAL NIGHTLY
Status: DISCONTINUED | OUTPATIENT
Start: 2020-01-01 | End: 2020-01-01 | Stop reason: HOSPADM

## 2020-01-01 RX ORDER — HYDROMORPHONE HYDROCHLORIDE 2 MG/ML
0.2 INJECTION, SOLUTION INTRAMUSCULAR; INTRAVENOUS; SUBCUTANEOUS EVERY 5 MIN PRN
Status: DISCONTINUED | OUTPATIENT
Start: 2020-01-01 | End: 2020-01-01 | Stop reason: HOSPADM

## 2020-01-01 RX ORDER — MIDAZOLAM HYDROCHLORIDE 1 MG/ML
INJECTION INTRAMUSCULAR; INTRAVENOUS CODE/TRAUMA/SEDATION MEDICATION
Status: COMPLETED | OUTPATIENT
Start: 2020-01-01 | End: 2020-01-01

## 2020-01-01 RX ORDER — KETAMINE HCL IN 0.9 % NACL 50 MG/5 ML
SYRINGE (ML) INTRAVENOUS
Status: DISCONTINUED | OUTPATIENT
Start: 2020-01-01 | End: 2020-01-01

## 2020-01-01 RX ADMIN — CHOLESTYRAMINE 4 G: 4 POWDER, FOR SUSPENSION ORAL at 09:11

## 2020-01-01 RX ADMIN — SODIUM CHLORIDE: 0.9 INJECTION, SOLUTION INTRAVENOUS at 03:10

## 2020-01-01 RX ADMIN — Medication 500 MG: at 11:10

## 2020-01-01 RX ADMIN — HYDROCORTISONE SODIUM SUCCINATE 100 MG: 250 INJECTION, POWDER, FOR SOLUTION INTRAMUSCULAR; INTRAVENOUS at 12:11

## 2020-01-01 RX ADMIN — DICYCLOMINE HYDROCHLORIDE 10 MG: 10 CAPSULE ORAL at 09:10

## 2020-01-01 RX ADMIN — METRONIDAZOLE 500 MG: 500 INJECTION, SOLUTION INTRAVENOUS at 03:10

## 2020-01-01 RX ADMIN — METRONIDAZOLE 500 MG: 500 INJECTION, SOLUTION INTRAVENOUS at 04:10

## 2020-01-01 RX ADMIN — SODIUM BICARBONATE 650 MG TABLET 650 MG: at 09:02

## 2020-01-01 RX ADMIN — PEGFILGRASTIM 6 MG: KIT SUBCUTANEOUS at 02:08

## 2020-01-01 RX ADMIN — SODIUM BICARBONATE 650 MG TABLET 650 MG: at 05:10

## 2020-01-01 RX ADMIN — CALCITRIOL CAPSULES 0.25 MCG 0.5 MCG: 0.25 CAPSULE ORAL at 09:01

## 2020-01-01 RX ADMIN — HYDROCORTISONE SODIUM SUCCINATE 500 MG: 250 INJECTION, POWDER, FOR SOLUTION INTRAMUSCULAR; INTRAVENOUS at 05:11

## 2020-01-01 RX ADMIN — MICAFUNGIN SODIUM 100 MG: 20 INJECTION, POWDER, LYOPHILIZED, FOR SOLUTION INTRAVENOUS at 04:11

## 2020-01-01 RX ADMIN — METRONIDAZOLE 500 MG: 500 INJECTION, SOLUTION INTRAVENOUS at 07:11

## 2020-01-01 RX ADMIN — VINCRISTINE SULFATE 2 MG: 1 INJECTION, SOLUTION INTRAVENOUS at 01:10

## 2020-01-01 RX ADMIN — IPRATROPIUM BROMIDE AND ALBUTEROL SULFATE 3 ML: .5; 2.5 SOLUTION RESPIRATORY (INHALATION) at 04:10

## 2020-01-01 RX ADMIN — ROCURONIUM BROMIDE 5 MG: 10 INJECTION, SOLUTION INTRAVENOUS at 01:11

## 2020-01-01 RX ADMIN — EPHEDRINE SULFATE 10 MG: 50 INJECTION INTRAVENOUS at 05:10

## 2020-01-01 RX ADMIN — PREDNISONE 5 MG: 5 TABLET ORAL at 09:01

## 2020-01-01 RX ADMIN — Medication 125 MG: at 12:10

## 2020-01-01 RX ADMIN — IOHEXOL 75 ML: 350 INJECTION, SOLUTION INTRAVENOUS at 06:01

## 2020-01-01 RX ADMIN — SODIUM BICARBONATE 650 MG TABLET 1300 MG: at 09:11

## 2020-01-01 RX ADMIN — Medication 0.02 MCG/KG/MIN: at 11:10

## 2020-01-01 RX ADMIN — Medication 500 MG: at 05:10

## 2020-01-01 RX ADMIN — PHENYLEPHRINE HYDROCHLORIDE 2.5 MCG/KG/MIN: 10 INJECTION INTRAVENOUS at 11:11

## 2020-01-01 RX ADMIN — MIDODRINE HYDROCHLORIDE 10 MG: 2.5 TABLET ORAL at 09:10

## 2020-01-01 RX ADMIN — PROPOFOL 20 MG: 10 INJECTION, EMULSION INTRAVENOUS at 05:10

## 2020-01-01 RX ADMIN — MUPIROCIN: 20 OINTMENT TOPICAL at 08:11

## 2020-01-01 RX ADMIN — TRAVOPROST 1 DROP: 0.04 SOLUTION/ DROPS OPHTHALMIC at 09:10

## 2020-01-01 RX ADMIN — DIPHENHYDRAMINE HYDROCHLORIDE 50 MG: 50 INJECTION, SOLUTION INTRAMUSCULAR; INTRAVENOUS at 10:07

## 2020-01-01 RX ADMIN — MIDODRINE HYDROCHLORIDE 10 MG: 2.5 TABLET ORAL at 02:10

## 2020-01-01 RX ADMIN — DOXORUBICIN HYDROCHLORIDE 94 MG: 2 INJECTION, SOLUTION INTRAVENOUS at 02:07

## 2020-01-01 RX ADMIN — CHOLESTYRAMINE 4 G: 4 POWDER, FOR SUSPENSION ORAL at 08:10

## 2020-01-01 RX ADMIN — METRONIDAZOLE 500 MG: 500 INJECTION, SOLUTION INTRAVENOUS at 10:10

## 2020-01-01 RX ADMIN — ALLOPURINOL 300 MG: 100 TABLET ORAL at 09:10

## 2020-01-01 RX ADMIN — LEUCINE, PHENYLALANINE, LYSINE, METHIONINE, ISOLEUCINE, VALINE, HISTIDINE, THREONINE, TRYPTOPHAN, ALANINE, GLYCINE, ARGININE, PROLINE, SERINE, TYROSINE, SODIUM ACETATE, DIBASIC POTASSIUM PHOSPHATE, MAGNESIUM CHLORIDE, SODIUM CHLORIDE, CALCIUM CHLORIDE, DEXTROSE
311; 238; 247; 170; 255; 247; 204; 179; 77; 880; 438; 489; 289; 213; 17; 297; 261; 51; 77; 33; 5 INJECTION INTRAVENOUS at 05:10

## 2020-01-01 RX ADMIN — Medication 0.02 MCG/KG/MIN: at 06:10

## 2020-01-01 RX ADMIN — CALCITRIOL CAPSULES 0.25 MCG 0.5 MCG: 0.25 CAPSULE ORAL at 08:10

## 2020-01-01 RX ADMIN — ACETAMINOPHEN 650 MG: 325 TABLET ORAL at 09:07

## 2020-01-01 RX ADMIN — SODIUM BICARBONATE: 84 INJECTION, SOLUTION INTRAVENOUS at 08:11

## 2020-01-01 RX ADMIN — ASPIRIN 81 MG: 81 TABLET, COATED ORAL at 10:02

## 2020-01-01 RX ADMIN — HEPARIN SODIUM 3000 UNITS: 1000 INJECTION, SOLUTION INTRAVENOUS; SUBCUTANEOUS at 03:11

## 2020-01-01 RX ADMIN — METOPROLOL TARTRATE 12.5 MG: 25 TABLET, FILM COATED ORAL at 08:02

## 2020-01-01 RX ADMIN — DICYCLOMINE HYDROCHLORIDE 10 MG: 10 CAPSULE ORAL at 12:10

## 2020-01-01 RX ADMIN — MICAFUNGIN SODIUM 100 MG: 20 INJECTION, POWDER, LYOPHILIZED, FOR SOLUTION INTRAVENOUS at 05:11

## 2020-01-01 RX ADMIN — TACROLIMUS 2 MG: 1 CAPSULE ORAL at 05:10

## 2020-01-01 RX ADMIN — VANCOMYCIN HYDROCHLORIDE 500 MG: 500 INJECTION, POWDER, LYOPHILIZED, FOR SOLUTION INTRAVENOUS at 05:11

## 2020-01-01 RX ADMIN — LEVOTHYROXINE SODIUM ANHYDROUS 50 MCG: 100 INJECTION, POWDER, LYOPHILIZED, FOR SOLUTION INTRAVENOUS at 10:11

## 2020-01-01 RX ADMIN — BRIMONIDINE TARTRATE, TIMOLOL MALEATE 1 DROP: 2; 5 SOLUTION/ DROPS OPHTHALMIC at 10:10

## 2020-01-01 RX ADMIN — POTASSIUM CHLORIDE 40 MEQ: 1500 TABLET, EXTENDED RELEASE ORAL at 09:11

## 2020-01-01 RX ADMIN — FAMOTIDINE 20 MG: 10 INJECTION INTRAVENOUS at 01:09

## 2020-01-01 RX ADMIN — MORPHINE SULFATE 2 MG: 2 INJECTION, SOLUTION INTRAMUSCULAR; INTRAVENOUS at 12:11

## 2020-01-01 RX ADMIN — TRAVOPROST 1 DROP: 0.04 SOLUTION/ DROPS OPHTHALMIC at 08:10

## 2020-01-01 RX ADMIN — LEVOTHYROXINE SODIUM 100 MCG: 0.1 TABLET ORAL at 05:10

## 2020-01-01 RX ADMIN — Medication 125 MG: at 06:10

## 2020-01-01 RX ADMIN — BRIMONIDINE TARTRATE, TIMOLOL MALEATE 1 DROP: 2; 5 SOLUTION/ DROPS OPHTHALMIC at 09:10

## 2020-01-01 RX ADMIN — MIDODRINE HYDROCHLORIDE 10 MG: 2.5 TABLET ORAL at 01:10

## 2020-01-01 RX ADMIN — CEFEPIME HYDROCHLORIDE 1 G: 1 INJECTION, SOLUTION INTRAVENOUS at 07:10

## 2020-01-01 RX ADMIN — Medication 125 MG: at 05:10

## 2020-01-01 RX ADMIN — DICYCLOMINE HYDROCHLORIDE 10 MG: 10 CAPSULE ORAL at 06:10

## 2020-01-01 RX ADMIN — MIDODRINE HYDROCHLORIDE 10 MG: 2.5 TABLET ORAL at 05:10

## 2020-01-01 RX ADMIN — POTASSIUM CHLORIDE 20 MEQ: 1500 TABLET, EXTENDED RELEASE ORAL at 08:10

## 2020-01-01 RX ADMIN — SODIUM BICARBONATE 650 MG TABLET 650 MG: at 03:10

## 2020-01-01 RX ADMIN — SODIUM CHLORIDE: 0.9 INJECTION, SOLUTION INTRAVENOUS at 11:08

## 2020-01-01 RX ADMIN — DOXORUBICIN HYDROCHLORIDE 92 MG: 2 INJECTION, SOLUTION INTRAVENOUS at 01:10

## 2020-01-01 RX ADMIN — PHENYLEPHRINE HYDROCHLORIDE 1.5 MCG/KG/MIN: 10 INJECTION INTRAVENOUS at 01:11

## 2020-01-01 RX ADMIN — Medication 500 MG: at 01:10

## 2020-01-01 RX ADMIN — CHOLESTYRAMINE 4 G: 4 POWDER, FOR SUSPENSION ORAL at 03:10

## 2020-01-01 RX ADMIN — FERROUS SULFATE TAB EC 325 MG (65 MG FE EQUIVALENT) 325 MG: 325 (65 FE) TABLET DELAYED RESPONSE at 09:10

## 2020-01-01 RX ADMIN — CHOLESTYRAMINE 4 G: 4 POWDER, FOR SUSPENSION ORAL at 02:10

## 2020-01-01 RX ADMIN — PIPERACILLIN AND TAZOBACTAM 4.5 G: 4; .5 INJECTION, POWDER, LYOPHILIZED, FOR SOLUTION INTRAVENOUS; PARENTERAL at 04:02

## 2020-01-01 RX ADMIN — CHOLESTYRAMINE 4 G: 4 POWDER, FOR SUSPENSION ORAL at 09:10

## 2020-01-01 RX ADMIN — FERROUS SULFATE TAB EC 325 MG (65 MG FE EQUIVALENT) 325 MG: 325 (65 FE) TABLET DELAYED RESPONSE at 08:10

## 2020-01-01 RX ADMIN — KETOCONAZOLE 100 MG: 200 TABLET ORAL at 10:02

## 2020-01-01 RX ADMIN — HYDROCORTISONE SODIUM SUCCINATE 500 MG: 250 INJECTION, POWDER, FOR SOLUTION INTRAMUSCULAR; INTRAVENOUS at 07:11

## 2020-01-01 RX ADMIN — TRAVOPROST 1 DROP: 0.04 SOLUTION/ DROPS OPHTHALMIC at 09:11

## 2020-01-01 RX ADMIN — HEPARIN 500 UNITS: 100 SYRINGE at 02:10

## 2020-01-01 RX ADMIN — HYDROCORTISONE SODIUM SUCCINATE 100 MG: 100 INJECTION, POWDER, FOR SOLUTION INTRAMUSCULAR; INTRAVENOUS at 06:11

## 2020-01-01 RX ADMIN — KETOCONAZOLE 100 MG: 200 TABLET ORAL at 12:01

## 2020-01-01 RX ADMIN — DICYCLOMINE HYDROCHLORIDE 10 MG: 10 CAPSULE ORAL at 04:10

## 2020-01-01 RX ADMIN — CALCITRIOL CAPSULES 0.25 MCG 0.5 MCG: 0.25 CAPSULE ORAL at 09:10

## 2020-01-01 RX ADMIN — METRONIDAZOLE 500 MG: 500 INJECTION, SOLUTION INTRAVENOUS at 12:10

## 2020-01-01 RX ADMIN — CIPROFLOXACIN 500 MG: 500 TABLET ORAL at 11:03

## 2020-01-01 RX ADMIN — PEGFILGRASTIM 6 MG: KIT SUBCUTANEOUS at 02:10

## 2020-01-01 RX ADMIN — METRONIDAZOLE 500 MG: 500 INJECTION, SOLUTION INTRAVENOUS at 04:11

## 2020-01-01 RX ADMIN — CHOLESTYRAMINE 4 G: 4 POWDER, FOR SUSPENSION ORAL at 11:10

## 2020-01-01 RX ADMIN — TRAVOPROST 1 DROP: 0.04 SOLUTION/ DROPS OPHTHALMIC at 11:10

## 2020-01-01 RX ADMIN — PHENYLEPHRINE HYDROCHLORIDE 100 MCG: 10 INJECTION INTRAVENOUS at 04:10

## 2020-01-01 RX ADMIN — METRONIDAZOLE 500 MG: 500 INJECTION, SOLUTION INTRAVENOUS at 11:11

## 2020-01-01 RX ADMIN — Medication 4 TABLET: at 07:10

## 2020-01-01 RX ADMIN — ALLOPURINOL 100 MG: 100 TABLET ORAL at 09:10

## 2020-01-01 RX ADMIN — DRONABINOL 2.5 MG: 2.5 CAPSULE ORAL at 09:10

## 2020-01-01 RX ADMIN — INSULIN HUMAN 10 UNITS: 100 INJECTION, SOLUTION PARENTERAL at 03:11

## 2020-01-01 RX ADMIN — CALCIUM CHLORIDE 1 G: 100 INJECTION, SOLUTION INTRAVENOUS at 01:11

## 2020-01-01 RX ADMIN — METOPROLOL TARTRATE 12.5 MG: 25 TABLET, FILM COATED ORAL at 11:01

## 2020-01-01 RX ADMIN — ACETAMINOPHEN 650 MG: 325 TABLET ORAL at 03:10

## 2020-01-01 RX ADMIN — LEVOTHYROXINE SODIUM 100 MCG: 100 TABLET ORAL at 06:02

## 2020-01-01 RX ADMIN — MORPHINE SULFATE 2 MG: 2 INJECTION, SOLUTION INTRAMUSCULAR; INTRAVENOUS at 10:10

## 2020-01-01 RX ADMIN — CEFEPIME HYDROCHLORIDE 1 G: 1 INJECTION, SOLUTION INTRAVENOUS at 08:10

## 2020-01-01 RX ADMIN — CALCIUM GLUCONATE 1000 MG: 20 INJECTION, SOLUTION INTRAVENOUS at 12:11

## 2020-01-01 RX ADMIN — DIPHENHYDRAMINE HYDROCHLORIDE 50 MG: 50 INJECTION INTRAMUSCULAR; INTRAVENOUS at 01:09

## 2020-01-01 RX ADMIN — MIDODRINE HYDROCHLORIDE 10 MG: 2.5 TABLET ORAL at 03:11

## 2020-01-01 RX ADMIN — MORPHINE SULFATE 2 MG: 2 INJECTION, SOLUTION INTRAMUSCULAR; INTRAVENOUS at 03:11

## 2020-01-01 RX ADMIN — BRIMONIDINE TARTRATE, TIMOLOL MALEATE 1 DROP: 2; 5 SOLUTION/ DROPS OPHTHALMIC at 11:11

## 2020-01-01 RX ADMIN — DEXTROSE AND SODIUM CHLORIDE: 5; .45 INJECTION, SOLUTION INTRAVENOUS at 03:10

## 2020-01-01 RX ADMIN — MORPHINE SULFATE 8 MG: 4 INJECTION INTRAVENOUS at 01:10

## 2020-01-01 RX ADMIN — METOPROLOL TARTRATE 12.5 MG: 25 TABLET, FILM COATED ORAL at 09:02

## 2020-01-01 RX ADMIN — LEVOTHYROXINE SODIUM 100 MCG: 0.1 TABLET ORAL at 08:10

## 2020-01-01 RX ADMIN — DIPHENHYDRAMINE HYDROCHLORIDE 50 MG: 50 INJECTION, SOLUTION INTRAMUSCULAR; INTRAVENOUS at 11:08

## 2020-01-01 RX ADMIN — BRIMONIDINE TARTRATE, TIMOLOL MALEATE 1 DROP: 2; 5 SOLUTION/ DROPS OPHTHALMIC at 10:11

## 2020-01-01 RX ADMIN — SODIUM BICARBONATE 650 MG TABLET 650 MG: at 11:01

## 2020-01-01 RX ADMIN — PHENYLEPHRINE HYDROCHLORIDE 200 MCG: 10 INJECTION INTRAVENOUS at 05:10

## 2020-01-01 RX ADMIN — TACROLIMUS 3 MG: 1 CAPSULE ORAL at 07:10

## 2020-01-01 RX ADMIN — Medication 4 TABLET: at 05:10

## 2020-01-01 RX ADMIN — TACROLIMUS 3 MG: 1 CAPSULE ORAL at 09:01

## 2020-01-01 RX ADMIN — ACETAMINOPHEN 650 MG: 325 TABLET ORAL at 04:10

## 2020-01-01 RX ADMIN — ALBUMIN (HUMAN): 12.5 SOLUTION INTRAVENOUS at 03:11

## 2020-01-01 RX ADMIN — DICYCLOMINE HYDROCHLORIDE 10 MG: 10 CAPSULE ORAL at 08:10

## 2020-01-01 RX ADMIN — I.V. FAT EMULSION 250 ML: 20 EMULSION INTRAVENOUS at 09:11

## 2020-01-01 RX ADMIN — Medication 4 TABLET: at 11:10

## 2020-01-01 RX ADMIN — CEFEPIME HYDROCHLORIDE 1 G: 1 INJECTION, SOLUTION INTRAVENOUS at 09:11

## 2020-01-01 RX ADMIN — FENTANYL CITRATE 50 MCG: 50 INJECTION, SOLUTION INTRAMUSCULAR; INTRAVENOUS at 02:11

## 2020-01-01 RX ADMIN — TACROLIMUS 3 MG: 1 CAPSULE ORAL at 12:01

## 2020-01-01 RX ADMIN — CIPROFLOXACIN HYDROCHLORIDE 750 MG: 500 TABLET, FILM COATED ORAL at 09:01

## 2020-01-01 RX ADMIN — CALCITRIOL CAPSULES 0.25 MCG 0.5 MCG: 0.25 CAPSULE ORAL at 10:10

## 2020-01-01 RX ADMIN — ACETAMINOPHEN 1000 MG: 500 TABLET ORAL at 07:01

## 2020-01-01 RX ADMIN — POTASSIUM CHLORIDE 40 MEQ: 29.8 INJECTION, SOLUTION INTRAVENOUS at 03:10

## 2020-01-01 RX ADMIN — TACROLIMUS 2 MG: 1 CAPSULE ORAL at 06:11

## 2020-01-01 RX ADMIN — METRONIDAZOLE 500 MG: 500 INJECTION, SOLUTION INTRAVENOUS at 06:10

## 2020-01-01 RX ADMIN — FERROUS SULFATE TAB EC 325 MG (65 MG FE EQUIVALENT) 325 MG: 325 (65 FE) TABLET DELAYED RESPONSE at 09:11

## 2020-01-01 RX ADMIN — PEGFILGRASTIM 6 MG: KIT SUBCUTANEOUS at 04:09

## 2020-01-01 RX ADMIN — AMIODARONE HYDROCHLORIDE 1 MG/MIN: 1.8 INJECTION, SOLUTION INTRAVENOUS at 01:11

## 2020-01-01 RX ADMIN — FENTANYL CITRATE 100 MCG: 50 INJECTION, SOLUTION INTRAMUSCULAR; INTRAVENOUS at 02:07

## 2020-01-01 RX ADMIN — HYDROCORTISONE SODIUM SUCCINATE 100 MG: 250 INJECTION, POWDER, FOR SOLUTION INTRAMUSCULAR; INTRAVENOUS at 06:11

## 2020-01-01 RX ADMIN — MORPHINE SULFATE 8 MG: 4 INJECTION INTRAVENOUS at 12:10

## 2020-01-01 RX ADMIN — FERROUS SULFATE TAB EC 325 MG (65 MG FE EQUIVALENT) 325 MG: 325 (65 FE) TABLET DELAYED RESPONSE at 10:10

## 2020-01-01 RX ADMIN — MORPHINE SULFATE 2 MG: 2 INJECTION, SOLUTION INTRAMUSCULAR; INTRAVENOUS at 09:11

## 2020-01-01 RX ADMIN — ALLOPURINOL 100 MG: 100 TABLET ORAL at 08:10

## 2020-01-01 RX ADMIN — Medication 125 MG: at 11:10

## 2020-01-01 RX ADMIN — CIPROFLOXACIN 400 MG: 2 INJECTION, SOLUTION INTRAVENOUS at 07:01

## 2020-01-01 RX ADMIN — MORPHINE SULFATE 4 MG: 4 INJECTION INTRAVENOUS at 10:11

## 2020-01-01 RX ADMIN — CEFEPIME HYDROCHLORIDE 2 G: 2 INJECTION, SOLUTION INTRAVENOUS at 03:10

## 2020-01-01 RX ADMIN — FAMOTIDINE 20 MG: 20 TABLET ORAL at 10:10

## 2020-01-01 RX ADMIN — TUBERCULIN PURIFIED PROTEIN DERIVATIVE 5 UNITS: 5 INJECTION, SOLUTION INTRADERMAL at 10:10

## 2020-01-01 RX ADMIN — MORPHINE SULFATE 2 MG: 2 INJECTION, SOLUTION INTRAMUSCULAR; INTRAVENOUS at 07:11

## 2020-01-01 RX ADMIN — TACROLIMUS 3 MG: 1 CAPSULE ORAL at 09:11

## 2020-01-01 RX ADMIN — METRONIDAZOLE 500 MG: 500 TABLET ORAL at 05:01

## 2020-01-01 RX ADMIN — SUCRALFATE 1 G: 1 SUSPENSION ORAL at 10:11

## 2020-01-01 RX ADMIN — Medication 4 TABLET: at 12:10

## 2020-01-01 RX ADMIN — Medication 500 MG: at 12:10

## 2020-01-01 RX ADMIN — PREDNISONE 5 MG: 5 TABLET ORAL at 08:01

## 2020-01-01 RX ADMIN — INSULIN HUMAN 10 UNITS: 100 INJECTION, SOLUTION PARENTERAL at 01:11

## 2020-01-01 RX ADMIN — FENTANYL CITRATE 50 MCG: 50 INJECTION, SOLUTION INTRAMUSCULAR; INTRAVENOUS at 12:06

## 2020-01-01 RX ADMIN — FAMOTIDINE 20 MG: 20 TABLET ORAL at 08:10

## 2020-01-01 RX ADMIN — I.V. FAT EMULSION 250 ML: 20 EMULSION INTRAVENOUS at 10:10

## 2020-01-01 RX ADMIN — PROPOFOL 20 MG: 10 INJECTION, EMULSION INTRAVENOUS at 04:10

## 2020-01-01 RX ADMIN — TACROLIMUS 3 MG: 1 CAPSULE ORAL at 08:02

## 2020-01-01 RX ADMIN — ASPIRIN 81 MG: 81 TABLET, COATED ORAL at 08:10

## 2020-01-01 RX ADMIN — PROPOFOL 30 MG: 10 INJECTION, EMULSION INTRAVENOUS at 08:07

## 2020-01-01 RX ADMIN — Medication 500 MG: at 06:10

## 2020-01-01 RX ADMIN — HYDROCORTISONE SODIUM SUCCINATE 100 MG: 100 INJECTION, POWDER, FOR SOLUTION INTRAMUSCULAR; INTRAVENOUS at 05:11

## 2020-01-01 RX ADMIN — METHOCARBAMOL TABLETS 1500 MG: 750 TABLET, COATED ORAL at 11:08

## 2020-01-01 RX ADMIN — MUPIROCIN: 20 OINTMENT TOPICAL at 09:11

## 2020-01-01 RX ADMIN — PROPOFOL 30 MCG/KG/MIN: 10 INJECTION, EMULSION INTRAVENOUS at 09:11

## 2020-01-01 RX ADMIN — DICYCLOMINE HYDROCHLORIDE 10 MG: 10 CAPSULE ORAL at 10:10

## 2020-01-01 RX ADMIN — TACROLIMUS 2 MG: 1 CAPSULE ORAL at 09:11

## 2020-01-01 RX ADMIN — SODIUM BICARBONATE: 84 INJECTION, SOLUTION INTRAVENOUS at 11:10

## 2020-01-01 RX ADMIN — LEVOTHYROXINE SODIUM 100 MCG: 0.1 TABLET ORAL at 06:10

## 2020-01-01 RX ADMIN — METRONIDAZOLE 500 MG: 500 INJECTION, SOLUTION INTRAVENOUS at 03:11

## 2020-01-01 RX ADMIN — SODIUM CHLORIDE: 0.9 INJECTION, SOLUTION INTRAVENOUS at 08:10

## 2020-01-01 RX ADMIN — TACROLIMUS 2 MG: 1 CAPSULE ORAL at 06:10

## 2020-01-01 RX ADMIN — ALLOPURINOL 100 MG: 100 TABLET ORAL at 08:11

## 2020-01-01 RX ADMIN — LEVOTHYROXINE SODIUM ANHYDROUS 50 MCG: 100 INJECTION, POWDER, LYOPHILIZED, FOR SOLUTION INTRAVENOUS at 08:11

## 2020-01-01 RX ADMIN — MIDODRINE HYDROCHLORIDE 10 MG: 2.5 TABLET ORAL at 11:11

## 2020-01-01 RX ADMIN — HYOSCYAMINE SULFATE 0.12 MG: 0.12 TABLET, ORALLY DISINTEGRATING ORAL at 04:10

## 2020-01-01 RX ADMIN — SODIUM BICARBONATE: 84 INJECTION, SOLUTION INTRAVENOUS at 01:10

## 2020-01-01 RX ADMIN — TACROLIMUS 3 MG: 1 CAPSULE ORAL at 09:10

## 2020-01-01 RX ADMIN — METRONIDAZOLE 500 MG: 500 INJECTION, SOLUTION INTRAVENOUS at 07:10

## 2020-01-01 RX ADMIN — METRONIDAZOLE 500 MG: 500 INJECTION, SOLUTION INTRAVENOUS at 06:11

## 2020-01-01 RX ADMIN — ASPIRIN 81 MG: 81 TABLET, COATED ORAL at 09:10

## 2020-01-01 RX ADMIN — POTASSIUM CHLORIDE 20 MEQ: 1500 TABLET, EXTENDED RELEASE ORAL at 09:10

## 2020-01-01 RX ADMIN — BRIMONIDINE TARTRATE, TIMOLOL MALEATE 1 DROP: 2; 5 SOLUTION/ DROPS OPHTHALMIC at 11:10

## 2020-01-01 RX ADMIN — LEVOTHYROXINE SODIUM 100 MCG: 100 TABLET ORAL at 08:01

## 2020-01-01 RX ADMIN — MORPHINE SULFATE 2 MG: 2 INJECTION, SOLUTION INTRAMUSCULAR; INTRAVENOUS at 02:11

## 2020-01-01 RX ADMIN — CEFEPIME HYDROCHLORIDE 1 G: 1 INJECTION, SOLUTION INTRAVENOUS at 07:11

## 2020-01-01 RX ADMIN — CEFEPIME HYDROCHLORIDE 1 G: 1 INJECTION, SOLUTION INTRAVENOUS at 08:11

## 2020-01-01 RX ADMIN — TRAVOPROST 1 DROP: 0.04 SOLUTION/ DROPS OPHTHALMIC at 10:10

## 2020-01-01 RX ADMIN — ONDANSETRON 4 MG: 2 INJECTION, SOLUTION INTRAMUSCULAR; INTRAVENOUS at 02:11

## 2020-01-01 RX ADMIN — MIDODRINE HYDROCHLORIDE 10 MG: 2.5 TABLET ORAL at 03:10

## 2020-01-01 RX ADMIN — LIDOCAINE HYDROCHLORIDE 75 MG: 20 INJECTION, SOLUTION INTRAVENOUS at 01:11

## 2020-01-01 RX ADMIN — RETINOL, ERGOCALCIFEROL, .ALPHA.-TOCOPHEROL ACETATE, DL-, PHYTONADIONE, ASCORBIC ACID, NIACINAMIDE, RIBOFLAVIN 5-PHOSPHATE SODIUM, THIAMINE HYDROCHLORIDE, PYRIDOXINE HYDROCHLORIDE, DEXPANTHENOL, BIOTIN, FOLIC ACID, AND CYANOCOBALAMIN: KIT at 03:10

## 2020-01-01 RX ADMIN — TACROLIMUS 3 MG: 1 CAPSULE ORAL at 08:10

## 2020-01-01 RX ADMIN — ETOMIDATE 5 MG: 2 INJECTION INTRAVENOUS at 04:10

## 2020-01-01 RX ADMIN — SODIUM CHLORIDE: 0.9 INJECTION, SOLUTION INTRAVENOUS at 02:01

## 2020-01-01 RX ADMIN — FAMOTIDINE 20 MG: 20 TABLET ORAL at 09:10

## 2020-01-01 RX ADMIN — SODIUM CHLORIDE 500 ML: 0.9 INJECTION, SOLUTION INTRAVENOUS at 12:10

## 2020-01-01 RX ADMIN — CHOLESTYRAMINE 4 G: 4 POWDER, FOR SUSPENSION ORAL at 10:10

## 2020-01-01 RX ADMIN — SODIUM BICARBONATE 650 MG TABLET 650 MG: at 08:10

## 2020-01-01 RX ADMIN — TACROLIMUS 2 MG: 0.5 CAPSULE ORAL at 08:02

## 2020-01-01 RX ADMIN — MUPIROCIN: 20 OINTMENT TOPICAL at 08:10

## 2020-01-01 RX ADMIN — SODIUM BICARBONATE 650 MG TABLET 650 MG: at 10:02

## 2020-01-01 RX ADMIN — VANCOMYCIN HYDROCHLORIDE 1000 MG: 1 INJECTION, POWDER, LYOPHILIZED, FOR SOLUTION INTRAVENOUS at 09:10

## 2020-01-01 RX ADMIN — DEXAMETHASONE SODIUM PHOSPHATE 4 MG: 4 INJECTION, SOLUTION INTRA-ARTICULAR; INTRALESIONAL; INTRAMUSCULAR; INTRAVENOUS; SOFT TISSUE at 01:11

## 2020-01-01 RX ADMIN — METRONIDAZOLE 500 MG: 500 INJECTION, SOLUTION INTRAVENOUS at 06:01

## 2020-01-01 RX ADMIN — POTASSIUM CHLORIDE 30 MEQ: 750 CAPSULE, EXTENDED RELEASE ORAL at 10:02

## 2020-01-01 RX ADMIN — METOPROLOL TARTRATE 12.5 MG: 25 TABLET, FILM COATED ORAL at 01:07

## 2020-01-01 RX ADMIN — FAMOTIDINE 20 MG: 10 INJECTION INTRAVENOUS at 11:10

## 2020-01-01 RX ADMIN — CYCLOPHOSPHAMIDE 1405 MG: 1 INJECTION, POWDER, FOR SOLUTION INTRAVENOUS; ORAL at 01:08

## 2020-01-01 RX ADMIN — EPINEPHRINE 1 MG: 0.1 INJECTION, SOLUTION ENDOTRACHEAL; INTRACARDIAC; INTRAVENOUS at 01:11

## 2020-01-01 RX ADMIN — METRONIDAZOLE 500 MG: 500 TABLET ORAL at 09:01

## 2020-01-01 RX ADMIN — DEXTROSE MONOHYDRATE 25 G: 25 INJECTION, SOLUTION INTRAVENOUS at 03:11

## 2020-01-01 RX ADMIN — CALCITRIOL CAPSULES 0.25 MCG 0.5 MCG: 0.25 CAPSULE ORAL at 09:11

## 2020-01-01 RX ADMIN — HYDROMORPHONE HYDROCHLORIDE 0.2 MG: 2 INJECTION INTRAMUSCULAR; INTRAVENOUS; SUBCUTANEOUS at 05:11

## 2020-01-01 RX ADMIN — METRONIDAZOLE 500 MG: 500 INJECTION, SOLUTION INTRAVENOUS at 10:11

## 2020-01-01 RX ADMIN — Medication 4 TABLET: at 08:10

## 2020-01-01 RX ADMIN — EPINEPHRINE 1 MG: 0.1 INJECTION, SOLUTION ENDOTRACHEAL; INTRACARDIAC; INTRAVENOUS at 03:11

## 2020-01-01 RX ADMIN — OXYCODONE HYDROCHLORIDE 5 MG: 5 TABLET ORAL at 09:10

## 2020-01-01 RX ADMIN — ROCURONIUM BROMIDE 40 MG: 10 INJECTION, SOLUTION INTRAVENOUS at 01:11

## 2020-01-01 RX ADMIN — Medication 0.4 MCG/KG/MIN: at 12:10

## 2020-01-01 RX ADMIN — GANCICLOVIR SODIUM 112 MG: 500 INJECTION, POWDER, LYOPHILIZED, FOR SOLUTION INTRAVENOUS at 07:11

## 2020-01-01 RX ADMIN — FIDAXOMICIN 200 MG: 200 TABLET, FILM COATED ORAL at 08:10

## 2020-01-01 RX ADMIN — TACROLIMUS 2 MG: 0.5 CAPSULE ORAL at 05:02

## 2020-01-01 RX ADMIN — PHENYLEPHRINE HYDROCHLORIDE 4 MCG/KG/MIN: 10 INJECTION INTRAVENOUS at 12:11

## 2020-01-01 RX ADMIN — HYDROCORTISONE SODIUM SUCCINATE 100 MG: 100 INJECTION, POWDER, FOR SOLUTION INTRAMUSCULAR; INTRAVENOUS at 12:11

## 2020-01-01 RX ADMIN — DEXTROSE MONOHYDRATE 25 G: 25 INJECTION, SOLUTION INTRAVENOUS at 02:11

## 2020-01-01 RX ADMIN — CEFEPIME HYDROCHLORIDE 2 G: 2 INJECTION, SOLUTION INTRAVENOUS at 04:10

## 2020-01-01 RX ADMIN — ACETAMINOPHEN 650 MG: 325 TABLET ORAL at 11:08

## 2020-01-01 RX ADMIN — PHENYLEPHRINE HYDROCHLORIDE 50 MCG: 10 INJECTION INTRAVENOUS at 08:07

## 2020-01-01 RX ADMIN — HYDROCORTISONE SODIUM SUCCINATE 500 MG: 250 INJECTION, POWDER, FOR SOLUTION INTRAMUSCULAR; INTRAVENOUS at 12:11

## 2020-01-01 RX ADMIN — DRONABINOL 5 MG: 2.5 CAPSULE ORAL at 10:10

## 2020-01-01 RX ADMIN — MIDAZOLAM HYDROCHLORIDE 2 MG: 1 INJECTION, SOLUTION INTRAMUSCULAR; INTRAVENOUS at 02:07

## 2020-01-01 RX ADMIN — HEPARIN SODIUM 3000 UNITS: 1000 INJECTION, SOLUTION INTRAVENOUS; SUBCUTANEOUS at 06:11

## 2020-01-01 RX ADMIN — HEPARIN SODIUM (PORCINE) LOCK FLUSH IV SOLN 100 UNIT/ML 500 UNITS: 100 SOLUTION at 03:09

## 2020-01-01 RX ADMIN — SUCRALFATE 1 G: 1 SUSPENSION ORAL at 09:11

## 2020-01-01 RX ADMIN — PANTOPRAZOLE SODIUM 40 MG: 40 INJECTION, POWDER, FOR SOLUTION INTRAVENOUS at 09:11

## 2020-01-01 RX ADMIN — DEXAMETHASONE SODIUM PHOSPHATE: 4 INJECTION, SOLUTION INTRA-ARTICULAR; INTRALESIONAL; INTRAMUSCULAR; INTRAVENOUS; SOFT TISSUE at 12:10

## 2020-01-01 RX ADMIN — LIDOCAINE HYDROCHLORIDE 10 ML: 10 INJECTION, SOLUTION INFILTRATION; PERINEURAL at 05:10

## 2020-01-01 RX ADMIN — SODIUM BICARBONATE 50 MEQ: 84 INJECTION INTRAVENOUS at 01:11

## 2020-01-01 RX ADMIN — METRONIDAZOLE 500 MG: 500 INJECTION, SOLUTION INTRAVENOUS at 02:11

## 2020-01-01 RX ADMIN — MIDODRINE HYDROCHLORIDE 10 MG: 2.5 TABLET ORAL at 08:10

## 2020-01-01 RX ADMIN — SODIUM CHLORIDE: 0.9 INJECTION, SOLUTION INTRAVENOUS at 02:07

## 2020-01-01 RX ADMIN — ASPIRIN 81 MG: 81 TABLET, COATED ORAL at 10:10

## 2020-01-01 RX ADMIN — I.V. FAT EMULSION 250 ML: 20 EMULSION INTRAVENOUS at 11:11

## 2020-01-01 RX ADMIN — DEXAMETHASONE SODIUM PHOSPHATE 4 MG: 4 INJECTION, SOLUTION INTRAMUSCULAR; INTRAVENOUS at 08:07

## 2020-01-01 RX ADMIN — PHENYLEPHRINE HYDROCHLORIDE 5 MCG/KG/MIN: 10 INJECTION INTRAVENOUS at 07:11

## 2020-01-01 RX ADMIN — BRIMONIDINE TARTRATE, TIMOLOL MALEATE 1 DROP: 2; 5 SOLUTION/ DROPS OPHTHALMIC at 08:10

## 2020-01-01 RX ADMIN — Medication 20 MG: at 08:07

## 2020-01-01 RX ADMIN — GLYCOPYRROLATE 0.2 MG: 0.2 INJECTION, SOLUTION INTRAMUSCULAR; INTRAVENOUS at 09:07

## 2020-01-01 RX ADMIN — METRONIDAZOLE 500 MG: 500 INJECTION, SOLUTION INTRAVENOUS at 08:10

## 2020-01-01 RX ADMIN — CEFEPIME HYDROCHLORIDE 2 G: 2 INJECTION, SOLUTION INTRAVENOUS at 05:10

## 2020-01-01 RX ADMIN — CALCIUM CHLORIDE 300 MG: 100 INJECTION, SOLUTION INTRAVENOUS at 03:11

## 2020-01-01 RX ADMIN — POTASSIUM CHLORIDE 40 MEQ: 7.46 INJECTION, SOLUTION INTRAVENOUS at 10:10

## 2020-01-01 RX ADMIN — TAMSULOSIN HYDROCHLORIDE 0.4 MG: 0.4 CAPSULE ORAL at 07:07

## 2020-01-01 RX ADMIN — Medication 4 TABLET: at 04:10

## 2020-01-01 RX ADMIN — IOHEXOL 500 ML: 9 SOLUTION ORAL at 06:11

## 2020-01-01 RX ADMIN — SODIUM BICARBONATE: 84 INJECTION, SOLUTION INTRAVENOUS at 09:11

## 2020-01-01 RX ADMIN — VANCOMYCIN HYDROCHLORIDE 1000 MG: 1 INJECTION, POWDER, LYOPHILIZED, FOR SOLUTION INTRAVENOUS at 12:10

## 2020-01-01 RX ADMIN — LIDOCAINE HYDROCHLORIDE 100 MG: 20 INJECTION, SOLUTION INTRAVENOUS at 01:11

## 2020-01-01 RX ADMIN — FAMOTIDINE 20 MG: 10 INJECTION, SOLUTION INTRAVENOUS at 09:07

## 2020-01-01 RX ADMIN — TRAVOPROST 1 DROP: 0.04 SOLUTION/ DROPS OPHTHALMIC at 11:11

## 2020-01-01 RX ADMIN — DRONABINOL 2.5 MG: 2.5 CAPSULE ORAL at 08:10

## 2020-01-01 RX ADMIN — FENTANYL CITRATE 25 MCG: 50 INJECTION, SOLUTION INTRAMUSCULAR; INTRAVENOUS at 04:11

## 2020-01-01 RX ADMIN — ASCORBIC ACID, VITAMIN A PALMITATE, CHOLECALCIFEROL, THIAMINE HYDROCHLORIDE, RIBOFLAVIN-5 PHOSPHATE SODIUM, PYRIDOXINE HYDROCHLORIDE, NIACINAMIDE, DEXPANTHENOL, ALPHA-TOCOPHEROL ACETATE, VITAMIN K1, FOLIC ACID, BIOTIN, CYANOCOBALAMIN: 200; 3300; 200; 6; 3.6; 6; 40; 15; 10; 150; 600; 60; 5 INJECTION, SOLUTION INTRAVENOUS at 10:10

## 2020-01-01 RX ADMIN — PHENYLEPHRINE HYDROCHLORIDE 3.5 MCG/KG/MIN: 10 INJECTION INTRAVENOUS at 07:11

## 2020-01-01 RX ADMIN — Medication 0.16 MCG/KG/MIN: at 12:10

## 2020-01-01 RX ADMIN — NOREPINEPHRINE BITARTRATE 0.02 MCG/KG/MIN: 1 INJECTION, SOLUTION, CONCENTRATE INTRAVENOUS at 04:11

## 2020-01-01 RX ADMIN — HYOSCYAMINE SULFATE 0.12 MG: 0.12 TABLET, ORALLY DISINTEGRATING ORAL at 08:11

## 2020-01-01 RX ADMIN — PROPOFOL 70 MG: 10 INJECTION, EMULSION INTRAVENOUS at 02:07

## 2020-01-01 RX ADMIN — PIPERACILLIN AND TAZOBACTAM 4.5 G: 4; .5 INJECTION, POWDER, LYOPHILIZED, FOR SOLUTION INTRAVENOUS; PARENTERAL at 01:02

## 2020-01-01 RX ADMIN — POTASSIUM CHLORIDE 60 MEQ: 7.46 INJECTION, SOLUTION INTRAVENOUS at 06:11

## 2020-01-01 RX ADMIN — CIPROFLOXACIN HYDROCHLORIDE 500 MG: 500 TABLET, FILM COATED ORAL at 07:01

## 2020-01-01 RX ADMIN — CHOLESTYRAMINE 4 G: 4 POWDER, FOR SUSPENSION ORAL at 08:11

## 2020-01-01 RX ADMIN — SODIUM CHLORIDE 2040 ML: 0.9 INJECTION, SOLUTION INTRAVENOUS at 03:10

## 2020-01-01 RX ADMIN — POTASSIUM CHLORIDE 40 MEQ: 7.46 INJECTION, SOLUTION INTRAVENOUS at 07:10

## 2020-01-01 RX ADMIN — VANCOMYCIN HYDROCHLORIDE 500 MG: 500 INJECTION, POWDER, LYOPHILIZED, FOR SOLUTION INTRAVENOUS at 09:10

## 2020-01-01 RX ADMIN — FAMOTIDINE 20 MG: 40 POWDER, FOR SUSPENSION ORAL at 08:02

## 2020-01-01 RX ADMIN — ALLOPURINOL 300 MG: 100 TABLET ORAL at 08:10

## 2020-01-01 RX ADMIN — LEVOTHYROXINE SODIUM 100 MCG: 100 TABLET ORAL at 05:02

## 2020-01-01 RX ADMIN — FENTANYL CITRATE 100 MCG: 50 INJECTION, SOLUTION INTRAMUSCULAR; INTRAVENOUS at 08:07

## 2020-01-01 RX ADMIN — PANTOPRAZOLE SODIUM 40 MG: 40 INJECTION, POWDER, FOR SOLUTION INTRAVENOUS at 08:11

## 2020-01-01 RX ADMIN — MIDODRINE HYDROCHLORIDE 10 MG: 2.5 TABLET ORAL at 09:11

## 2020-01-01 RX ADMIN — METOPROLOL TARTRATE 12.5 MG: 25 TABLET, FILM COATED ORAL at 10:02

## 2020-01-01 RX ADMIN — FENTANYL CITRATE 50 MCG: 50 INJECTION, SOLUTION INTRAMUSCULAR; INTRAVENOUS at 04:11

## 2020-01-01 RX ADMIN — METRONIDAZOLE 500 MG: 500 INJECTION, SOLUTION INTRAVENOUS at 05:10

## 2020-01-01 RX ADMIN — POTASSIUM & SODIUM PHOSPHATES POWDER PACK 280-160-250 MG 2 PACKET: 280-160-250 PACK at 09:02

## 2020-01-01 RX ADMIN — MORPHINE SULFATE 2 MG: 2 INJECTION, SOLUTION INTRAMUSCULAR; INTRAVENOUS at 06:11

## 2020-01-01 RX ADMIN — ACETAMINOPHEN 650 MG: 325 TABLET ORAL at 04:02

## 2020-01-01 RX ADMIN — CALCITRIOL CAPSULES 0.25 MCG 0.5 MCG: 0.25 CAPSULE ORAL at 08:11

## 2020-01-01 RX ADMIN — ASPIRIN 81 MG: 81 TABLET, COATED ORAL at 09:02

## 2020-01-01 RX ADMIN — SODIUM BICARBONATE 650 MG TABLET 650 MG: at 11:11

## 2020-01-01 RX ADMIN — I.V. FAT EMULSION 250 ML: 20 EMULSION INTRAVENOUS at 09:10

## 2020-01-01 RX ADMIN — Medication 0.22 MCG/KG/MIN: at 10:10

## 2020-01-01 RX ADMIN — SODIUM CHLORIDE: 0.9 INJECTION, SOLUTION INTRAVENOUS at 12:11

## 2020-01-01 RX ADMIN — KETOCONAZOLE 100 MG: 200 TABLET ORAL at 09:02

## 2020-01-01 RX ADMIN — LEVOTHYROXINE SODIUM 100 MCG: 0.1 TABLET ORAL at 05:11

## 2020-01-01 RX ADMIN — BRIMONIDINE TARTRATE, TIMOLOL MALEATE 1 DROP: 2; 5 SOLUTION/ DROPS OPHTHALMIC at 09:11

## 2020-01-01 RX ADMIN — Medication 400 MG: at 09:02

## 2020-01-01 RX ADMIN — SODIUM BICARBONATE: 84 INJECTION, SOLUTION INTRAVENOUS at 08:10

## 2020-01-01 RX ADMIN — VANCOMYCIN HYDROCHLORIDE 500 MG: 500 INJECTION, POWDER, LYOPHILIZED, FOR SOLUTION INTRAVENOUS at 11:10

## 2020-01-01 RX ADMIN — MIDODRINE HYDROCHLORIDE 10 MG: 2.5 TABLET ORAL at 08:11

## 2020-01-01 RX ADMIN — PHENYLEPHRINE HYDROCHLORIDE 200 MCG: 10 INJECTION INTRAVENOUS at 03:07

## 2020-01-01 RX ADMIN — Medication 125 MG: at 08:10

## 2020-01-01 RX ADMIN — TACROLIMUS 2 MG: 1 CAPSULE ORAL at 05:11

## 2020-01-01 RX ADMIN — MORPHINE SULFATE 4 MG: 4 INJECTION INTRAVENOUS at 05:10

## 2020-01-01 RX ADMIN — DRONABINOL 2.5 MG: 2.5 CAPSULE ORAL at 11:10

## 2020-01-01 RX ADMIN — HEPARIN SODIUM 3000 UNITS: 1000 INJECTION, SOLUTION INTRAVENOUS; SUBCUTANEOUS at 12:11

## 2020-01-01 RX ADMIN — ASCORBIC ACID, VITAMIN A PALMITATE, CHOLECALCIFEROL, THIAMINE HYDROCHLORIDE, RIBOFLAVIN-5 PHOSPHATE SODIUM, PYRIDOXINE HYDROCHLORIDE, NIACINAMIDE, DEXPANTHENOL, ALPHA-TOCOPHEROL ACETATE, VITAMIN K1, FOLIC ACID, BIOTIN, CYANOCOBALAMIN: 200; 3300; 200; 6; 3.6; 6; 40; 15; 10; 150; 600; 60; 5 INJECTION, SOLUTION INTRAVENOUS at 11:11

## 2020-01-01 RX ADMIN — SODIUM CHLORIDE: 9 INJECTION, SOLUTION INTRAVENOUS at 09:07

## 2020-01-01 RX ADMIN — PANTOPRAZOLE SODIUM 40 MG: 40 INJECTION, POWDER, FOR SOLUTION INTRAVENOUS at 08:10

## 2020-01-01 RX ADMIN — Medication 125 MG: at 01:10

## 2020-01-01 RX ADMIN — POTASSIUM & SODIUM PHOSPHATES POWDER PACK 280-160-250 MG 1 PACKET: 280-160-250 PACK at 07:01

## 2020-01-01 RX ADMIN — SUCRALFATE 1 G: 1 SUSPENSION ORAL at 05:11

## 2020-01-01 RX ADMIN — NOREPINEPHRINE BITARTRATE 1 MCG/KG/MIN: 1 INJECTION, SOLUTION, CONCENTRATE INTRAVENOUS at 02:11

## 2020-01-01 RX ADMIN — Medication 125 MG: at 10:10

## 2020-01-01 RX ADMIN — SODIUM BICARBONATE: 84 INJECTION, SOLUTION INTRAVENOUS at 03:10

## 2020-01-01 RX ADMIN — PROPOFOL 20 MCG/KG/MIN: 10 INJECTION, EMULSION INTRAVENOUS at 04:11

## 2020-01-01 RX ADMIN — DEXTROSE MONOHYDRATE 25 G: 25 INJECTION, SOLUTION INTRAVENOUS at 01:11

## 2020-01-01 RX ADMIN — DRONABINOL 2.5 MG: 2.5 CAPSULE ORAL at 10:10

## 2020-01-01 RX ADMIN — ASCORBIC ACID, VITAMIN A PALMITATE, CHOLECALCIFEROL, THIAMINE HYDROCHLORIDE, RIBOFLAVIN-5 PHOSPHATE SODIUM, PYRIDOXINE HYDROCHLORIDE, NIACINAMIDE, DEXPANTHENOL, ALPHA-TOCOPHEROL ACETATE, VITAMIN K1, FOLIC ACID, BIOTIN, CYANOCOBALAMIN: 200; 3300; 200; 6; 3.6; 6; 40; 15; 10; 150; 600; 60; 5 INJECTION, SOLUTION INTRAVENOUS at 11:10

## 2020-01-01 RX ADMIN — POTASSIUM PHOSPHATE, MONOBASIC AND POTASSIUM PHOSPHATE, DIBASIC 15 MMOL: 224; 236 INJECTION, SOLUTION, CONCENTRATE INTRAVENOUS at 05:01

## 2020-01-01 RX ADMIN — DRONABINOL 5 MG: 2.5 CAPSULE ORAL at 09:10

## 2020-01-01 RX ADMIN — LIDOCAINE HYDROCHLORIDE 0.2 ML: 10 INJECTION, SOLUTION EPIDURAL; INFILTRATION; INTRACAUDAL; PERINEURAL at 11:06

## 2020-01-01 RX ADMIN — POTASSIUM CHLORIDE 20 MEQ: 1500 TABLET, EXTENDED RELEASE ORAL at 05:10

## 2020-01-01 RX ADMIN — DEXTROSE AND SODIUM CHLORIDE: 5; .45 INJECTION, SOLUTION INTRAVENOUS at 01:10

## 2020-01-01 RX ADMIN — CIPROFLOXACIN HYDROCHLORIDE 500 MG: 500 TABLET, FILM COATED ORAL at 09:01

## 2020-01-01 RX ADMIN — METRONIDAZOLE 500 MG: 500 INJECTION, SOLUTION INTRAVENOUS at 08:11

## 2020-01-01 RX ADMIN — OXYCODONE HYDROCHLORIDE 10 MG: 5 TABLET ORAL at 04:07

## 2020-01-01 RX ADMIN — DICYCLOMINE HYDROCHLORIDE 10 MG: 10 CAPSULE ORAL at 05:10

## 2020-01-01 RX ADMIN — EPINEPHRINE 1 MG: 0.1 INJECTION, SOLUTION ENDOTRACHEAL; INTRACARDIAC; INTRAVENOUS at 02:11

## 2020-01-01 RX ADMIN — ACETAMINOPHEN 650 MG: 325 TABLET ORAL at 12:10

## 2020-01-01 RX ADMIN — Medication 0.16 MCG/KG/MIN: at 04:10

## 2020-01-01 RX ADMIN — DICYCLOMINE HYDROCHLORIDE 10 MG: 10 CAPSULE ORAL at 02:10

## 2020-01-01 RX ADMIN — METRONIDAZOLE 500 MG: 500 TABLET ORAL at 10:01

## 2020-01-01 RX ADMIN — CALCITRIOL CAPSULES 0.25 MCG 0.5 MCG: 0.25 CAPSULE ORAL at 05:01

## 2020-01-01 RX ADMIN — SUCRALFATE 1 G: 1 SUSPENSION ORAL at 08:10

## 2020-01-01 RX ADMIN — VINCRISTINE SULFATE 2 MG: 1 INJECTION, SOLUTION INTRAVENOUS at 02:07

## 2020-01-01 RX ADMIN — ONDANSETRON 4 MG: 2 INJECTION, SOLUTION INTRAMUSCULAR; INTRAVENOUS at 09:07

## 2020-01-01 RX ADMIN — ACETAMINOPHEN 650 MG: 325 TABLET ORAL at 06:10

## 2020-01-01 RX ADMIN — POTASSIUM CHLORIDE 40 MEQ: 7.46 INJECTION, SOLUTION INTRAVENOUS at 09:10

## 2020-01-01 RX ADMIN — TACROLIMUS 3 MG: 1 CAPSULE ORAL at 10:10

## 2020-01-01 RX ADMIN — SODIUM BICARBONATE 50 MEQ: 84 INJECTION, SOLUTION INTRAVENOUS at 05:11

## 2020-01-01 RX ADMIN — MORPHINE SULFATE 4 MG: 4 INJECTION INTRAVENOUS at 09:11

## 2020-01-01 RX ADMIN — ALTEPLASE 2 MG: 2.2 INJECTION, POWDER, LYOPHILIZED, FOR SOLUTION INTRAVENOUS at 02:11

## 2020-01-01 RX ADMIN — DEXAMETHASONE SODIUM PHOSPHATE: 4 INJECTION, SOLUTION INTRA-ARTICULAR; INTRALESIONAL; INTRAMUSCULAR; INTRAVENOUS; SOFT TISSUE at 01:09

## 2020-01-01 RX ADMIN — KETAMINE HYDROCHLORIDE 20 MG: 100 INJECTION, SOLUTION, CONCENTRATE INTRAMUSCULAR; INTRAVENOUS at 04:11

## 2020-01-01 RX ADMIN — Medication 0.05 MCG/KG/MIN: at 11:10

## 2020-01-01 RX ADMIN — ACETAMINOPHEN 650 MG: 325 TABLET ORAL at 01:09

## 2020-01-01 RX ADMIN — DEXAMETHASONE SODIUM PHOSPHATE: 4 INJECTION, SOLUTION INTRA-ARTICULAR; INTRALESIONAL; INTRAMUSCULAR; INTRAVENOUS; SOFT TISSUE at 12:08

## 2020-01-01 RX ADMIN — HYDROCORTISONE SODIUM SUCCINATE 100 MG: 100 INJECTION, POWDER, FOR SOLUTION INTRAMUSCULAR; INTRAVENOUS at 11:11

## 2020-01-01 RX ADMIN — CYCLOPHOSPHAMIDE 1405 MG: 1 INJECTION, POWDER, FOR SOLUTION INTRAVENOUS; ORAL at 02:09

## 2020-01-01 RX ADMIN — CALCIUM CHLORIDE 1 G: 100 INJECTION, SOLUTION INTRAVENOUS at 03:11

## 2020-01-01 RX ADMIN — MIDODRINE HYDROCHLORIDE 10 MG: 2.5 TABLET ORAL at 10:10

## 2020-01-01 RX ADMIN — Medication 400 MG: at 10:02

## 2020-01-01 RX ADMIN — SODIUM CHLORIDE 1000 ML: 0.9 INJECTION, SOLUTION INTRAVENOUS at 10:10

## 2020-01-01 RX ADMIN — MICAFUNGIN SODIUM 100 MG: 20 INJECTION, POWDER, LYOPHILIZED, FOR SOLUTION INTRAVENOUS at 03:11

## 2020-01-01 RX ADMIN — ACETAMINOPHEN 1000 MG: 500 TABLET ORAL at 05:10

## 2020-01-01 RX ADMIN — METRONIDAZOLE 500 MG: 500 INJECTION, SOLUTION INTRAVENOUS at 11:10

## 2020-01-01 RX ADMIN — FENTANYL CITRATE 50 MCG: 50 INJECTION, SOLUTION INTRAMUSCULAR; INTRAVENOUS at 01:11

## 2020-01-01 RX ADMIN — CEFTRIAXONE 2 G: 2 INJECTION, SOLUTION INTRAVENOUS at 11:02

## 2020-01-01 RX ADMIN — CYCLOPHOSPHAMIDE 1405 MG: 500 INJECTION, POWDER, FOR SOLUTION INTRAVENOUS; ORAL at 02:07

## 2020-01-01 RX ADMIN — SODIUM CHLORIDE, SODIUM LACTATE, POTASSIUM CHLORIDE, AND CALCIUM CHLORIDE: .6; .31; .03; .02 INJECTION, SOLUTION INTRAVENOUS at 07:10

## 2020-01-01 RX ADMIN — CALCIUM CHLORIDE 300 MG: 100 INJECTION, SOLUTION INTRAVENOUS at 05:10

## 2020-01-01 RX ADMIN — NOREPINEPHRINE BITARTRATE 0.02 MCG/KG/MIN: 1 INJECTION, SOLUTION, CONCENTRATE INTRAVENOUS at 07:11

## 2020-01-01 RX ADMIN — TACROLIMUS 2 MG: 1 CAPSULE ORAL at 05:01

## 2020-01-01 RX ADMIN — SUCRALFATE 1 G: 1 SUSPENSION ORAL at 12:11

## 2020-01-01 RX ADMIN — RITUXIMAB 700 MG: 10 INJECTION, SOLUTION INTRAVENOUS at 10:07

## 2020-01-01 RX ADMIN — TBO-FILGRASTIM 300 MCG: 300 INJECTION, SOLUTION SUBCUTANEOUS at 08:10

## 2020-01-01 RX ADMIN — METRONIDAZOLE 500 MG: 500 SOLUTION INTRAVENOUS at 07:01

## 2020-01-01 RX ADMIN — SODIUM CHLORIDE 1000 ML: 0.9 INJECTION, SOLUTION INTRAVENOUS at 01:01

## 2020-01-01 RX ADMIN — ALBUTEROL SULFATE 10 MG: 2.5 SOLUTION RESPIRATORY (INHALATION) at 01:11

## 2020-01-01 RX ADMIN — METRONIDAZOLE 500 MG: 500 INJECTION, SOLUTION INTRAVENOUS at 12:11

## 2020-01-01 RX ADMIN — TACROLIMUS 3 MG: 1 CAPSULE ORAL at 11:11

## 2020-01-01 RX ADMIN — PREDNISONE 5 MG: 5 TABLET ORAL at 08:02

## 2020-01-01 RX ADMIN — PHENYLEPHRINE HYDROCHLORIDE 100 MCG: 10 INJECTION INTRAVENOUS at 03:07

## 2020-01-01 RX ADMIN — VINCRISTINE SULFATE 2 MG: 1 INJECTION, SOLUTION INTRAVENOUS at 01:08

## 2020-01-01 RX ADMIN — PIPERACILLIN AND TAZOBACTAM 4.5 G: 4; .5 INJECTION, POWDER, LYOPHILIZED, FOR SOLUTION INTRAVENOUS; PARENTERAL at 08:02

## 2020-01-01 RX ADMIN — PANTOPRAZOLE SODIUM 40 MG: 40 INJECTION, POWDER, FOR SOLUTION INTRAVENOUS at 07:10

## 2020-01-01 RX ADMIN — FAMOTIDINE 20 MG: 10 INJECTION INTRAVENOUS at 11:08

## 2020-01-01 RX ADMIN — ROCURONIUM BROMIDE 10 MG: 10 INJECTION, SOLUTION INTRAVENOUS at 01:11

## 2020-01-01 RX ADMIN — TRAVOPROST 1 DROP: 0.04 SOLUTION/ DROPS OPHTHALMIC at 12:11

## 2020-01-01 RX ADMIN — ERAVACYCLINE 80.1 MG: 50 INJECTION, POWDER, LYOPHILIZED, FOR SOLUTION INTRAVENOUS at 11:10

## 2020-01-01 RX ADMIN — CALCIUM GLUCONATE 1000 MG: 20 INJECTION, SOLUTION INTRAVENOUS at 11:11

## 2020-01-01 RX ADMIN — KETOCONAZOLE 100 MG: 200 TABLET ORAL at 09:01

## 2020-01-01 RX ADMIN — EPHEDRINE SULFATE 5 MG: 50 INJECTION INTRAVENOUS at 03:07

## 2020-01-01 RX ADMIN — Medication 400 MG: at 08:02

## 2020-01-01 RX ADMIN — SODIUM BICARBONATE: 84 INJECTION, SOLUTION INTRAVENOUS at 06:11

## 2020-01-01 RX ADMIN — PHENYLEPHRINE HYDROCHLORIDE 0.5 MCG/KG/MIN: 10 INJECTION INTRAVENOUS at 10:11

## 2020-01-01 RX ADMIN — SUCCINYLCHOLINE CHLORIDE 100 MG: 20 INJECTION, SOLUTION INTRAMUSCULAR; INTRAVENOUS; PARENTERAL at 01:11

## 2020-01-01 RX ADMIN — MORPHINE SULFATE 4 MG: 4 INJECTION INTRAVENOUS at 02:11

## 2020-01-01 RX ADMIN — SODIUM CHLORIDE: 0.9 INJECTION, SOLUTION INTRAVENOUS at 01:10

## 2020-01-01 RX ADMIN — POTASSIUM CHLORIDE 40 MEQ: 1500 TABLET, EXTENDED RELEASE ORAL at 08:11

## 2020-01-01 RX ADMIN — ASCORBIC ACID, VITAMIN A PALMITATE, CHOLECALCIFEROL, THIAMINE HYDROCHLORIDE, RIBOFLAVIN-5 PHOSPHATE SODIUM, PYRIDOXINE HYDROCHLORIDE, NIACINAMIDE, DEXPANTHENOL, ALPHA-TOCOPHEROL ACETATE, VITAMIN K1, FOLIC ACID, BIOTIN, CYANOCOBALAMIN: 200; 3300; 200; 6; 3.6; 6; 40; 15; 10; 150; 600; 60; 5 INJECTION, SOLUTION INTRAVENOUS at 09:10

## 2020-01-01 RX ADMIN — RITUXIMAB AND HYALURONIDASE 1400 MG: 120; 2000 INJECTION, SOLUTION SUBCUTANEOUS at 12:10

## 2020-01-01 RX ADMIN — SODIUM CHLORIDE 2040 ML: 0.9 INJECTION, SOLUTION INTRAVENOUS at 06:01

## 2020-01-01 RX ADMIN — PREDNISONE 5 MG: 5 TABLET ORAL at 09:02

## 2020-01-01 RX ADMIN — SODIUM CHLORIDE: 0.9 INJECTION, SOLUTION INTRAVENOUS at 01:09

## 2020-01-01 RX ADMIN — TACROLIMUS 3 MG: 1 CAPSULE ORAL at 10:01

## 2020-01-01 RX ADMIN — TACROLIMUS 2 MG: 1 CAPSULE ORAL at 08:01

## 2020-01-01 RX ADMIN — SODIUM CHLORIDE: 0.9 INJECTION, SOLUTION INTRAVENOUS at 09:10

## 2020-01-01 RX ADMIN — CEFTRIAXONE 2 G: 2 INJECTION, SOLUTION INTRAVENOUS at 01:02

## 2020-01-01 RX ADMIN — CEFEPIME HYDROCHLORIDE 1 G: 1 INJECTION, SOLUTION INTRAVENOUS at 11:11

## 2020-01-01 RX ADMIN — ASCORBIC ACID, VITAMIN A PALMITATE, CHOLECALCIFEROL, THIAMINE HYDROCHLORIDE, RIBOFLAVIN-5 PHOSPHATE SODIUM, PYRIDOXINE HYDROCHLORIDE, NIACINAMIDE, DEXPANTHENOL, ALPHA-TOCOPHEROL ACETATE, VITAMIN K1, FOLIC ACID, BIOTIN, CYANOCOBALAMIN: 200; 3300; 200; 6; 3.6; 6; 40; 15; 10; 150; 600; 60; 5 INJECTION, SOLUTION INTRAVENOUS at 12:10

## 2020-01-01 RX ADMIN — PHENYLEPHRINE HYDROCHLORIDE 100 MCG: 10 INJECTION INTRAVENOUS at 09:07

## 2020-01-01 RX ADMIN — FAMOTIDINE 20 MG: 40 POWDER, FOR SUSPENSION ORAL at 10:02

## 2020-01-01 RX ADMIN — CHOLESTYRAMINE 4 G: 4 POWDER, FOR SUSPENSION ORAL at 05:10

## 2020-01-01 RX ADMIN — LEVOTHYROXINE SODIUM ANHYDROUS 50 MCG: 100 INJECTION, POWDER, LYOPHILIZED, FOR SOLUTION INTRAVENOUS at 09:11

## 2020-01-01 RX ADMIN — PHENYLEPHRINE HYDROCHLORIDE 0.42 MCG/KG/MIN: 10 INJECTION INTRAVENOUS at 02:11

## 2020-01-01 RX ADMIN — FAMOTIDINE 20 MG: 40 POWDER, FOR SUSPENSION ORAL at 11:01

## 2020-01-01 RX ADMIN — TACROLIMUS 2 MG: 1 CAPSULE ORAL at 06:01

## 2020-01-01 RX ADMIN — Medication 0.16 MCG/KG/MIN: at 06:10

## 2020-01-01 RX ADMIN — VANCOMYCIN HYDROCHLORIDE 500 MG: 500 INJECTION, POWDER, LYOPHILIZED, FOR SOLUTION INTRAVENOUS at 08:11

## 2020-01-01 RX ADMIN — POTASSIUM CHLORIDE 40 MEQ: 1500 TABLET, EXTENDED RELEASE ORAL at 09:01

## 2020-01-01 RX ADMIN — SODIUM CHLORIDE: 0.9 INJECTION, SOLUTION INTRAVENOUS at 01:11

## 2020-01-01 RX ADMIN — LEUCINE, PHENYLALANINE, LYSINE, METHIONINE, ISOLEUCINE, VALINE, HISTIDINE, THREONINE, TRYPTOPHAN, ALANINE, GLYCINE, ARGININE, PROLINE, SERINE, TYROSINE, SODIUM ACETATE, DIBASIC POTASSIUM PHOSPHATE, MAGNESIUM CHLORIDE, SODIUM CHLORIDE, CALCIUM CHLORIDE, DEXTROSE
311; 238; 247; 170; 255; 247; 204; 179; 77; 880; 438; 489; 289; 213; 17; 297; 261; 51; 77; 33; 5 INJECTION INTRAVENOUS at 03:10

## 2020-01-01 RX ADMIN — CALCIUM CHLORIDE 200 MG: 100 INJECTION, SOLUTION INTRAVENOUS at 03:11

## 2020-01-01 RX ADMIN — ALLOPURINOL 100 MG: 100 TABLET ORAL at 09:11

## 2020-01-01 RX ADMIN — POTASSIUM CHLORIDE 40 MEQ: 1.5 POWDER, FOR SOLUTION ORAL at 05:10

## 2020-01-01 RX ADMIN — FERROUS SULFATE TAB EC 325 MG (65 MG FE EQUIVALENT) 325 MG: 325 (65 FE) TABLET DELAYED RESPONSE at 12:10

## 2020-01-01 RX ADMIN — MIDODRINE HYDROCHLORIDE 5 MG: 2.5 TABLET ORAL at 08:10

## 2020-01-01 RX ADMIN — DIPHENHYDRAMINE HYDROCHLORIDE 50 MG: 50 INJECTION INTRAMUSCULAR; INTRAVENOUS at 11:10

## 2020-01-01 RX ADMIN — DEXAMETHASONE SODIUM PHOSPHATE 4 MG: 4 INJECTION, SOLUTION INTRA-ARTICULAR; INTRALESIONAL; INTRAMUSCULAR; INTRAVENOUS; SOFT TISSUE at 02:11

## 2020-01-01 RX ADMIN — SUCRALFATE 1 G: 1 SUSPENSION ORAL at 06:11

## 2020-01-01 RX ADMIN — ASPIRIN 325 MG ORAL TABLET 325 MG: 325 PILL ORAL at 07:08

## 2020-01-01 RX ADMIN — KETAMINE HYDROCHLORIDE 10 MG: 100 INJECTION, SOLUTION, CONCENTRATE INTRAMUSCULAR; INTRAVENOUS at 01:11

## 2020-01-01 RX ADMIN — POTASSIUM & SODIUM PHOSPHATES POWDER PACK 280-160-250 MG 2 PACKET: 280-160-250 PACK at 06:02

## 2020-01-01 RX ADMIN — SODIUM BICARBONATE 650 MG TABLET 650 MG: at 05:01

## 2020-01-01 RX ADMIN — I.V. FAT EMULSION 250 ML: 20 EMULSION INTRAVENOUS at 08:10

## 2020-01-01 RX ADMIN — METRONIDAZOLE 500 MG: 500 TABLET ORAL at 06:01

## 2020-01-01 RX ADMIN — HYDROCORTISONE SODIUM SUCCINATE 100 MG: 250 INJECTION, POWDER, FOR SOLUTION INTRAMUSCULAR; INTRAVENOUS at 02:11

## 2020-01-01 RX ADMIN — TACROLIMUS 3 MG: 1 CAPSULE ORAL at 10:02

## 2020-01-01 RX ADMIN — VANCOMYCIN HYDROCHLORIDE 500 MG: 500 INJECTION, POWDER, LYOPHILIZED, FOR SOLUTION INTRAVENOUS at 06:10

## 2020-01-01 RX ADMIN — Medication 0.42 MCG/KG/MIN: at 05:10

## 2020-01-01 RX ADMIN — PROPOFOL 100 MCG/KG/MIN: 10 INJECTION, EMULSION INTRAVENOUS at 02:07

## 2020-01-01 RX ADMIN — ONDANSETRON 4 MG: 2 INJECTION, SOLUTION INTRAMUSCULAR; INTRAVENOUS at 01:11

## 2020-01-01 RX ADMIN — SUGAMMADEX 200 MG: 100 INJECTION, SOLUTION INTRAVENOUS at 04:11

## 2020-01-01 RX ADMIN — FAMOTIDINE 20 MG: 20 TABLET ORAL at 09:01

## 2020-01-01 RX ADMIN — DOXORUBICIN HYDROCHLORIDE 94 MG: 2 INJECTION, SOLUTION INTRAVENOUS at 02:09

## 2020-01-01 RX ADMIN — MORPHINE SULFATE 4 MG: 4 INJECTION INTRAVENOUS at 06:10

## 2020-01-01 RX ADMIN — FENTANYL CITRATE 25 MCG: 50 INJECTION, SOLUTION INTRAMUSCULAR; INTRAVENOUS at 01:06

## 2020-01-01 RX ADMIN — CALCIUM GLUCONATE 1000 MG: 20 INJECTION, SOLUTION INTRAVENOUS at 06:11

## 2020-01-01 RX ADMIN — Medication 500 MG: at 01:11

## 2020-01-01 RX ADMIN — TACROLIMUS 2 MG: 1 CAPSULE ORAL at 08:10

## 2020-01-01 RX ADMIN — MAGNESIUM SULFATE IN WATER 2 G: 40 INJECTION, SOLUTION INTRAVENOUS at 12:01

## 2020-01-01 RX ADMIN — POTASSIUM CHLORIDE 20 MEQ: 1500 TABLET, EXTENDED RELEASE ORAL at 04:10

## 2020-01-01 RX ADMIN — PHENYLEPHRINE HYDROCHLORIDE 3.5 MCG/KG/MIN: 10 INJECTION INTRAVENOUS at 09:11

## 2020-01-01 RX ADMIN — PIPERACILLIN AND TAZOBACTAM 4.5 G: 4; .5 INJECTION, POWDER, LYOPHILIZED, FOR SOLUTION INTRAVENOUS; PARENTERAL at 12:02

## 2020-01-01 RX ADMIN — ALBUMIN (HUMAN) 50 G: 12.5 SOLUTION INTRAVENOUS at 08:11

## 2020-01-01 RX ADMIN — MIDAZOLAM HYDROCHLORIDE 4 MG: 1 INJECTION, SOLUTION INTRAMUSCULAR; INTRAVENOUS at 08:07

## 2020-01-01 RX ADMIN — POTASSIUM CHLORIDE 20 MEQ: 1500 TABLET, EXTENDED RELEASE ORAL at 03:10

## 2020-01-01 RX ADMIN — TACROLIMUS 2 MG: 1 CAPSULE ORAL at 12:01

## 2020-01-01 RX ADMIN — MIDODRINE HYDROCHLORIDE 10 MG: 2.5 TABLET ORAL at 04:10

## 2020-01-01 RX ADMIN — MIDAZOLAM HYDROCHLORIDE 0.5 MG: 1 INJECTION, SOLUTION INTRAMUSCULAR; INTRAVENOUS at 01:06

## 2020-01-01 RX ADMIN — MIDODRINE HYDROCHLORIDE 10 MG: 2.5 TABLET ORAL at 12:10

## 2020-01-01 RX ADMIN — POTASSIUM CHLORIDE 40 MEQ: 1500 TABLET, EXTENDED RELEASE ORAL at 12:01

## 2020-01-01 RX ADMIN — ETOMIDATE 20 MG: 2 INJECTION INTRAVENOUS at 01:11

## 2020-01-01 RX ADMIN — LEVOTHYROXINE SODIUM 100 MCG: 100 TABLET ORAL at 06:01

## 2020-01-01 RX ADMIN — SODIUM BICARBONATE: 84 INJECTION, SOLUTION INTRAVENOUS at 03:11

## 2020-01-01 RX ADMIN — VANCOMYCIN HYDROCHLORIDE 1500 MG: 1.5 INJECTION, POWDER, LYOPHILIZED, FOR SOLUTION INTRAVENOUS at 08:10

## 2020-01-01 RX ADMIN — VANCOMYCIN HYDROCHLORIDE 1000 MG: 1 INJECTION, POWDER, LYOPHILIZED, FOR SOLUTION INTRAVENOUS at 05:10

## 2020-01-01 RX ADMIN — CALCITRIOL CAPSULES 0.25 MCG 0.5 MCG: 0.25 CAPSULE ORAL at 11:11

## 2020-01-01 RX ADMIN — DOXORUBICIN HYDROCHLORIDE 94 MG: 2 INJECTION, SOLUTION INTRAVENOUS at 01:08

## 2020-01-01 RX ADMIN — VANCOMYCIN HYDROCHLORIDE 500 MG: 500 INJECTION, POWDER, LYOPHILIZED, FOR SOLUTION INTRAVENOUS at 08:10

## 2020-01-01 RX ADMIN — CALCIUM CHLORIDE 400 MG: 100 INJECTION, SOLUTION INTRAVENOUS at 05:10

## 2020-01-01 RX ADMIN — VANCOMYCIN HYDROCHLORIDE 1000 MG: 1 INJECTION, POWDER, LYOPHILIZED, FOR SOLUTION INTRAVENOUS at 10:11

## 2020-01-01 RX ADMIN — RITUXIMAB AND HYALURONIDASE 1400 MG: 120; 2000 INJECTION, SOLUTION SUBCUTANEOUS at 12:08

## 2020-01-01 RX ADMIN — VINCRISTINE SULFATE 2 MG: 1 INJECTION, SOLUTION INTRAVENOUS at 02:09

## 2020-01-01 RX ADMIN — CHOLESTYRAMINE 4 G: 4 POWDER, FOR SUSPENSION ORAL at 04:10

## 2020-01-01 RX ADMIN — ALLOPURINOL 300 MG: 100 TABLET ORAL at 10:10

## 2020-01-01 RX ADMIN — SUCRALFATE 1 G: 1 SUSPENSION ORAL at 11:11

## 2020-01-01 RX ADMIN — SODIUM CHLORIDE, SODIUM LACTATE, POTASSIUM CHLORIDE, AND CALCIUM CHLORIDE: .6; .31; .03; .02 INJECTION, SOLUTION INTRAVENOUS at 09:10

## 2020-01-01 RX ADMIN — SODIUM CHLORIDE: 0.9 INJECTION, SOLUTION INTRAVENOUS at 11:10

## 2020-01-01 RX ADMIN — Medication 0.16 MCG/KG/MIN: at 02:10

## 2020-01-01 RX ADMIN — MORPHINE SULFATE 2 MG: 2 INJECTION, SOLUTION INTRAMUSCULAR; INTRAVENOUS at 08:11

## 2020-01-01 RX ADMIN — VANCOMYCIN HYDROCHLORIDE 500 MG: 500 INJECTION, SOLUTION INTRAVENOUS at 05:11

## 2020-01-01 RX ADMIN — LEVOTHYROXINE SODIUM 100 MCG: 100 TABLET ORAL at 05:01

## 2020-01-01 RX ADMIN — PHENYLEPHRINE HYDROCHLORIDE 0.2 MCG: 10 INJECTION INTRAVENOUS at 02:11

## 2020-01-01 RX ADMIN — Medication 0.16 MCG/KG/MIN: at 09:10

## 2020-01-01 RX ADMIN — LIDOCAINE HYDROCHLORIDE: 20 JELLY TOPICAL at 11:03

## 2020-01-01 RX ADMIN — TRAVOPROST 1 DROP: 0.04 SOLUTION/ DROPS OPHTHALMIC at 08:11

## 2020-01-01 RX ADMIN — ALBUTEROL SULFATE 10 MG: 2.5 SOLUTION RESPIRATORY (INHALATION) at 03:11

## 2020-01-01 RX ADMIN — PROPOFOL 50 MCG/KG/MIN: 10 INJECTION, EMULSION INTRAVENOUS at 08:07

## 2020-01-01 RX ADMIN — CIPROFLOXACIN HYDROCHLORIDE 500 MG: 500 TABLET, FILM COATED ORAL at 01:08

## 2020-01-01 RX ADMIN — DEXAMETHASONE SODIUM PHOSPHATE: 4 INJECTION, SOLUTION INTRA-ARTICULAR; INTRALESIONAL; INTRAMUSCULAR; INTRAVENOUS; SOFT TISSUE at 01:07

## 2020-01-01 RX ADMIN — Medication 10 ML: at 02:10

## 2020-01-01 RX ADMIN — CEFTRIAXONE 2 G: 1 INJECTION, SOLUTION INTRAVENOUS at 08:07

## 2020-01-01 RX ADMIN — TBO-FILGRASTIM 300 MCG: 300 INJECTION, SOLUTION SUBCUTANEOUS at 09:10

## 2020-01-01 RX ADMIN — FENTANYL CITRATE 25 MCG: 50 INJECTION, SOLUTION INTRAMUSCULAR; INTRAVENOUS at 01:11

## 2020-01-01 RX ADMIN — TACROLIMUS 3 MG: 1 CAPSULE ORAL at 09:02

## 2020-01-01 RX ADMIN — SODIUM BICARBONATE 650 MG TABLET 650 MG: at 08:02

## 2020-01-01 RX ADMIN — ATROPINE SULFATE 1 MG: 0.1 INJECTION, SOLUTION INTRAVENOUS at 03:11

## 2020-01-01 RX ADMIN — SODIUM BICARBONATE 650 MG TABLET 650 MG: at 09:10

## 2020-01-01 RX ADMIN — ETOMIDATE 5 MG: 2 INJECTION INTRAVENOUS at 05:10

## 2020-01-01 RX ADMIN — SODIUM CHLORIDE: 0.9 INJECTION, SOLUTION INTRAVENOUS at 05:10

## 2020-01-01 RX ADMIN — AMIODARONE HYDROCHLORIDE 150 MG: 50 INJECTION, SOLUTION INTRAVENOUS at 01:11

## 2020-01-01 RX ADMIN — PEGFILGRASTIM 6 MG: KIT SUBCUTANEOUS at 03:07

## 2020-01-01 RX ADMIN — PROPOFOL 50 MG: 10 INJECTION, EMULSION INTRAVENOUS at 01:11

## 2020-01-01 RX ADMIN — METRONIDAZOLE 500 MG: 500 INJECTION, SOLUTION INTRAVENOUS at 09:10

## 2020-01-01 RX ADMIN — SODIUM CHLORIDE: 0.9 INJECTION, SOLUTION INTRAVENOUS at 03:11

## 2020-01-01 RX ADMIN — ALLOPURINOL 100 MG: 100 TABLET ORAL at 11:11

## 2020-01-01 RX ADMIN — ASCORBIC ACID, VITAMIN A PALMITATE, CHOLECALCIFEROL, THIAMINE HYDROCHLORIDE, RIBOFLAVIN-5 PHOSPHATE SODIUM, PYRIDOXINE HYDROCHLORIDE, NIACINAMIDE, DEXPANTHENOL, ALPHA-TOCOPHEROL ACETATE, VITAMIN K1, FOLIC ACID, BIOTIN, CYANOCOBALAMIN: 200; 3300; 200; 6; 3.6; 6; 40; 15; 10; 150; 600; 60; 5 INJECTION, SOLUTION INTRAVENOUS at 10:11

## 2020-01-01 RX ADMIN — SODIUM CHLORIDE 500 ML: 0.9 INJECTION, SOLUTION INTRAVENOUS at 11:06

## 2020-01-01 RX ADMIN — ERAVACYCLINE 80.1 MG: 50 INJECTION, POWDER, LYOPHILIZED, FOR SOLUTION INTRAVENOUS at 05:11

## 2020-01-01 RX ADMIN — VANCOMYCIN HYDROCHLORIDE 1750 MG: 100 INJECTION, POWDER, LYOPHILIZED, FOR SOLUTION INTRAVENOUS at 07:01

## 2020-01-01 RX ADMIN — SODIUM BICARBONATE 650 MG TABLET 650 MG: at 03:11

## 2020-01-01 RX ADMIN — MIDAZOLAM HYDROCHLORIDE 1 MG: 1 INJECTION, SOLUTION INTRAMUSCULAR; INTRAVENOUS at 12:06

## 2020-01-01 RX ADMIN — RITUXIMAB AND HYALURONIDASE 1400 MG: 120; 2000 INJECTION, SOLUTION SUBCUTANEOUS at 02:09

## 2020-01-01 RX ADMIN — MIDAZOLAM 2 MG: 1 INJECTION INTRAMUSCULAR; INTRAVENOUS at 03:11

## 2020-01-01 RX ADMIN — TBO-FILGRASTIM 300 MCG: 300 INJECTION, SOLUTION SUBCUTANEOUS at 05:10

## 2020-01-01 RX ADMIN — SODIUM CHLORIDE: 0.9 INJECTION, SOLUTION INTRAVENOUS at 07:10

## 2020-01-01 RX ADMIN — HEPARIN 500 UNITS: 100 SYRINGE at 04:07

## 2020-01-01 RX ADMIN — METRONIDAZOLE 500 MG: 500 TABLET ORAL at 02:01

## 2020-01-01 RX ADMIN — ETOMIDATE 15 MG: 2 INJECTION INTRAVENOUS at 04:10

## 2020-01-01 RX ADMIN — FIDAXOMICIN 200 MG: 200 TABLET, FILM COATED ORAL at 08:11

## 2020-01-01 RX ADMIN — LIDOCAINE HYDROCHLORIDE 100 MG: 20 INJECTION INTRAVENOUS at 04:10

## 2020-01-01 RX ADMIN — SODIUM BICARBONATE 650 MG TABLET 1300 MG: at 08:11

## 2020-01-01 RX ADMIN — ALBUMIN (HUMAN) 50 G: 12.5 SOLUTION INTRAVENOUS at 10:11

## 2020-01-01 RX ADMIN — CEFEPIME HYDROCHLORIDE 1 G: 1 INJECTION, SOLUTION INTRAVENOUS at 09:10

## 2020-01-01 RX ADMIN — ACETAMINOPHEN 650 MG: 325 TABLET ORAL at 11:10

## 2020-01-01 RX ADMIN — METRONIDAZOLE 500 MG: 500 TABLET ORAL at 03:01

## 2020-01-01 RX ADMIN — METRONIDAZOLE 500 MG: 500 INJECTION, SOLUTION INTRAVENOUS at 08:07

## 2020-01-01 RX ADMIN — ASPIRIN 81 MG: 81 TABLET, COATED ORAL at 08:02

## 2020-01-01 RX ADMIN — VANCOMYCIN HYDROCHLORIDE 500 MG: 500 INJECTION, POWDER, LYOPHILIZED, FOR SOLUTION INTRAVENOUS at 01:11

## 2020-01-01 RX ADMIN — DEXTROSE AND SODIUM CHLORIDE: 5; .45 INJECTION, SOLUTION INTRAVENOUS at 10:10

## 2020-01-01 RX ADMIN — SODIUM CHLORIDE: 9 INJECTION, SOLUTION INTRAVENOUS at 08:07

## 2020-01-01 RX ADMIN — FAMOTIDINE 20 MG: 20 TABLET ORAL at 08:01

## 2020-01-01 RX ADMIN — SUCCINYLCHOLINE CHLORIDE 200 MG: 20 INJECTION, SOLUTION INTRAMUSCULAR; INTRAVENOUS; PARENTERAL at 01:11

## 2020-01-01 RX ADMIN — TACROLIMUS 2 MG: 0.5 CAPSULE ORAL at 11:01

## 2020-01-01 RX ADMIN — CALCIUM CHLORIDE 1 G: 100 INJECTION, SOLUTION INTRAVENOUS at 02:11

## 2020-01-01 RX ADMIN — SODIUM BICARBONATE: 84 INJECTION, SOLUTION INTRAVENOUS at 07:11

## 2020-01-01 RX ADMIN — Medication 500 MG: at 08:10

## 2020-01-01 RX ADMIN — CYCLOPHOSPHAMIDE 1365 MG: 1 INJECTION, POWDER, FOR SOLUTION INTRAVENOUS; ORAL at 01:10

## 2020-01-01 RX ADMIN — SODIUM BICARBONATE 650 MG TABLET 650 MG: at 04:10

## 2020-01-01 RX ADMIN — IPRATROPIUM BROMIDE AND ALBUTEROL SULFATE 3 ML: .5; 2.5 SOLUTION RESPIRATORY (INHALATION) at 07:11

## 2020-01-01 RX ADMIN — TACROLIMUS 3 MG: 1 CAPSULE ORAL at 08:11

## 2020-01-01 RX ADMIN — SODIUM BICARBONATE 650 MG TABLET 650 MG: at 09:01

## 2020-01-01 RX ADMIN — ACETAMINOPHEN 500 MG: 10 INJECTION, SOLUTION INTRAVENOUS at 02:11

## 2020-01-01 RX ADMIN — SODIUM BICARBONATE 650 MG TABLET 650 MG: at 09:11

## 2020-01-01 RX ADMIN — CEFEPIME 1 G: 1 INJECTION, POWDER, FOR SOLUTION INTRAMUSCULAR; INTRAVENOUS at 08:01

## 2020-01-01 RX ADMIN — VANCOMYCIN HYDROCHLORIDE 500 MG: 500 INJECTION, POWDER, LYOPHILIZED, FOR SOLUTION INTRAVENOUS at 09:11

## 2020-01-01 RX ADMIN — SODIUM CHLORIDE, SODIUM LACTATE, POTASSIUM CHLORIDE, AND CALCIUM CHLORIDE: .6; .31; .03; .02 INJECTION, SOLUTION INTRAVENOUS at 12:10

## 2020-01-01 RX ADMIN — CALCIUM GLUCONATE 1 G: 94 INJECTION, SOLUTION INTRAVENOUS at 01:11

## 2020-01-01 RX ADMIN — PREDNISONE 5 MG: 5 TABLET ORAL at 10:02

## 2020-01-01 RX ADMIN — HEPARIN SODIUM (PORCINE) LOCK FLUSH IV SOLN 100 UNIT/ML 500 UNITS: 100 SOLUTION at 02:08

## 2020-01-06 PROBLEM — K62.5 RECTAL BLEEDING: Status: ACTIVE | Noted: 2020-01-01

## 2020-01-06 NOTE — ED TRIAGE NOTES
"Medical screening exam completed.  I have conducted a focused provider triage encounter, findings are as follows:    12:54 PM    Brief history of present illness:   Patient is a 69-year-old male with a history of kidney transplant in 2016 presents the ED with rectal bleeding.  Noted to episodes of blood in stool this morning.  States that he always has diarrhea, but had blood clots and "beet red" blood with his stools. Denies eating any beets.  Noted left middle quadrant pain yesterday or earlier this AM that is currently a 1/10 on the pain scale that is exacerbated with palpation to the area.  No radiating pain.  No nausea or vomiting.  Does have a history of hemorrhoids, but denies any rectal pain.  States that he does not believe it is from his hemorrhoids because "I with know!".    Vitals:    01/06/20 1144   BP: 136/80   Pulse: 81   Resp: 18   Temp: 98.4 °F (36.9 °C)   TempSrc: Oral   SpO2: 99%   Weight: 68 kg (150 lb)   Height: 5' 11" (1.803 m)       Pertinent physical exam:    Hemodynamically stable.  NAD and nontoxic appearing.  Sitting comfortably in.  Tenderness noted to left middle and lower quadrant with mild guarding.  No rigidity or rebound.    Brief workup plan:    History concerning for GI bleed vs. hemorrhoids vs. other etiologies.  He is not complaining of shortness of breath, weakness, or fatigue.  Will initiate workup, type and screen, and defer any further evaluation including rectal exam, and treatment to the primary ED team.    Preliminary workup initiated; this workup will be continued and followed by the physician or advanced practice provider that is assigned to the patient when roomed.      12:58 PM  Jovanna Patton PA-C  Emergent Department  [facility]  Spectralink #[phone] or #[phone]    "

## 2020-01-06 NOTE — PROVIDER PROGRESS NOTES - EMERGENCY DEPT.
Encounter Date: 1/6/2020    ED Physician Progress Notes        Physician Note:   I received checkout from Dr. Vizcarra.  Patient with left lower abdominal pain and rectal bleeding x2.    Plan was to follow up is CT scan.    On my exam he appears to be comfortable nontoxic.  His left lower quadrant tenderness without guarding or rebound.  Rectal exam done by Dr. Vizcarra had trace amount of blood on glove      Labs were reviewed and reassuring.  CT was limited without use of IV contrast, but he it appears he has diverticulosis with signs diverticulitis.  I doubt he has a perforation based on his presentation.    Will give IV antibiotics.  I consult discussed with Medicine.  Will admit for IV antibiotics and following for lower GI bleed.  He has not had a bowel movement in the last 12 hr so I doubt he has active bleeding

## 2020-01-06 NOTE — ED TRIAGE NOTES
Pt reports bloody diarrhea stools x2  At 2 am, bright red in color, denies SOB, N/V, dizziness, weakness, cold or flu like symptoms or recent illness           LOC: The patient is awake, alert, and oriented to place, time, situation. Affect is appropriate.  Speech is appropriate and clear.     APPEARANCE: Patient resting comfortably in no acute distress.  Patient is clean and well groomed.    SKIN: The skin is warm and dry; color consistent with ethnicity.  Patient has normal skin turgor and moist mucus membranes.  Skin intact; no breakdown, bruising noted to bilateral arms.     MUSCULOSKELETAL: Patient moving upper and lower extremities without difficulty. Generalized weakness.     RESPIRATORY: Airway is open and patent. Respirations spontaneous, even, easy, and non-labored.  Patient has a normal effort and rate.  No accessory muscle use noted. Denies cough.     CARDIAC:  Normal rhythm and rate noted.  No peripheral edema noted. No complaints of chest pain.      ABDOMEN: Soft, tender to palpation left lower ABD.  No distention noted.     NEUROLOGIC: Eyes open spontaneously.  Behavior appropriate to situation.  Follows commands; facial expression symmetrical.  Purposeful motor response noted; normal sensation in all extremities.

## 2020-01-06 NOTE — ED PROVIDER NOTES
Encounter Date: 2020       History     Chief Complaint   Patient presents with    Rectal Bleeding     very large and bright red twice, abd pain, kidney xplant 2016, not on blood thinners     69-year-old gentleman with history of end-stage renal disease status post kidney transplant in 2016 presents with 2 bloody bowel movements.  He reports that 12 hr ago he woke up from sleep with some left lower quadrant abdominal pain.  He states that he had a large blowout that was beet colored.  He reports that several hours later he had a 2nd bowel movement.  That bowel movement was also dark red colored and had some clot in it.  He reports some mild abdominal pain only when pressing on his abdomen.  He denies any nausea or vomiting.  He reports that he has been having some chronic back lately, but has not been taking any NSAIDs for this.    The history is provided by the patient.     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  Renal allograft biopsy 17 (DIVINE): 21 glomeruli, none globally sclerosed, <5% interstitial fibrosis, no ACR, c4d negative, AVR CCT Type 2 (V1 lesion); plan THYMO     Dissecting aortic aneurysm (any part), abdominal     Diverticulosis     Encounter for blood transfusion     ESRD (end stage renal disease) 2010    H/O urethral stricture 2018    H/O: urethral stricture      History of AAA (abdominal aortic aneurysm) repair     History of urethral stricture 12/19/2018    Hypertension     Hypokalemia     Hypothyroidism 1/10/2014    Inguinal hernia bilateral, non-recurrent     Kidney stones     Organ transplant candidate 11/26/2013    Plantar warts 1/10/2014    Recurrent UTI 7/28/2017    S/P kidney transplant     Secondary hyperparathyroidism, renal     Thyroid disease      Past Surgical History:   Procedure Laterality Date    ABDOMINAL SURGERY      exploratory lapatomy x 2    ABLATION N/A 8/22/2019    Procedure: ABLATION, SVT;  Surgeon: Emerson Mcmanus MD;  Location: [facility] EP LAB;  Service: Cardiology;  Laterality: N/A;  SVT, RFA, CARTO, anes, GP, 323    AORTA - SUPERIOR MESENTERIC ARTERY BYPASS GRAFT      BLADDER NECK RECONSTRUCTION      BLADDER SURGERY      COLONOSCOPY  10/10/2013    Dr. Gutierrez, repeat in 5 years    CYSTOSCOPY      CYSTOSCOPY N/A 12/19/2018    Procedure: CYSTOSCOPY;  Surgeon: Dewey Mann MD;  Location: [facility] OR [address] [place] [address];  Service: Urology;  Laterality: N/A;  45 min    CYSTOURETHROSCOPY WITH DIRECT VISION INTERNAL URETHROTOMY N/A 12/19/2018    Procedure: CYSTOSCOPY, WITH DIRECT VISION INTERNAL URETHROTOMY;  Surgeon: Dewey Mann MD;  [facility];  Service: Urology;  Laterality: N/A;    DILATION OF URETHRA N/A 12/19/2018    Procedure: DILATION, URETHRA;  Surgeon: Dewey Mann MD;  Location: [facility] OR 1ST FLR;  Service: Urology;  Laterality: N/A;    FLEXIBLE CYSTOSCOPY N/A 11/6/2019    Procedure: CYSTOSCOPY, FLEXIBLE;  Surgeon: Dewey Mann MD;  Location: [facility] OR 2ND FLR;  Service: Urology;  Laterality: N/A;    GASTROJEJUNOSTOMY      HEMORRHOID SURGERY      HERNIA REPAIR      KIDNEY TRANSPLANT      LEFT HEART CATHETERIZATION Left 8/20/2019    Procedure: Left heart cath;  Surgeon: Javan Oscar MD;  Location: [facility] CATH/EP LAB;  Service: Cardiology;  Laterality: Left;    LITHOTRIPSY      PERCUTANEOUS  NEPHROLITHOTRIPSY      right  ESWL  10/31/12    right ESWL  12    URETHROPLASTY USING PATCH GRAFT N/A 2019    Procedure: URETHROPLASTY, USING PATCH GRAFT BUCCAL MUCOSA GRAFT;  Surgeon: Dewey Mann MD;  Location: [facility] OR [address] [place] [address];  Service: Urology;  Laterality: N/A;  3 HOURS     Family History   Problem Relation Age of Onset    Diabetes Mother     Alzheimer's disease Mother     Alcohol abuse Father     HIV Brother     Stroke Maternal Aunt     Kidney disease Paternal Uncle     Kidney disease Cousin     No Known Problems Sister     No Known Problems Daughter     No Known Problems Sister     No Known Problems Brother     No Known Problems Brother     Cancer Brother         thyroid cancer    Melanoma Neg Hx     Psoriasis Neg Hx     Lupus Neg Hx     Eczema Neg Hx     Colon cancer Neg Hx     Colon polyps Neg Hx     Crohn's disease Neg Hx     Ulcerative colitis Neg Hx     Celiac disease Neg Hx      Social History     Tobacco Use    Smoking status: Former Smoker     Years: 40.00     Types: Cigarettes     Last attempt to quit: 2010     Years since quittin.5    Smokeless tobacco: Never Used   Substance Use Topics    Alcohol use: No     Comment: stopped ETOH in     Drug use: No     Comment: THC in youth     Review of Systems   Constitutional: Negative for fever.   HENT: Negative for sore throat.    Respiratory: Negative for shortness of breath.    Cardiovascular: Negative for chest pain.   Gastrointestinal: Positive for abdominal pain and blood in stool. Negative for nausea.   Genitourinary: Negative for dysuria.   Musculoskeletal: Negative for back pain.   Skin: Negative for rash.   Neurological: Negative for weakness.   Hematological: Does not bruise/bleed easily.   All other systems reviewed and are negative.      Physical Exam     Initial Vitals [20 1144]   BP Pulse Resp Temp SpO2   136/80 81 18 98.4 °F (36.9 °C) 99 %      MAP       --         Physical  Exam    Nursing note and vitals reviewed.  Constitutional: Vital signs are normal. He appears well-developed and well-nourished.   HENT:   Head: Normocephalic and atraumatic.   Mouth/Throat: Oropharynx is clear and moist.   Eyes: Conjunctivae and EOM are normal. Pupils are equal, round, and reactive to light.   Neck: Trachea normal and normal range of motion. Neck supple.   Cardiovascular: Normal rate, regular rhythm, normal heart sounds and normal pulses.   Pulmonary/Chest: Breath sounds normal. No respiratory distress.   Abdominal: Soft. Normal appearance. There is tenderness. There is guarding.   Multiple surgical scars noted  Mild tenderness to the left lower quadrant with guarding, no rebound   Genitourinary: Rectal exam shows guaiac positive stool. Guaiac positive stool. : Acceptable.  Genitourinary Comments: External hemorrhoid, nontender rectal exam  Minimal stool in the vault, pink streaks noted     Musculoskeletal: Normal range of motion.   Neurological: He is alert and oriented to person, place, and time.   Skin: Skin is warm and dry.         ED Course   Procedures  Labs Reviewed   CBC W/ AUTO DIFFERENTIAL - Abnormal; Notable for the following components:       Result Value    RBC 3.80 (*)     Hemoglobin 11.4 (*)     Hematocrit 36.8 (*)     Mean Corpuscular Hemoglobin Conc 31.0 (*)     Immature Granulocytes 0.9 (*)     Immature Grans (Abs) 0.08 (*)     Lymph # 0.9 (*)     Gran% 80.1 (*)     Lymph% 10.2 (*)     All other components within normal limits   COMPREHENSIVE METABOLIC PANEL - Abnormal; Notable for the following components:    Creatinine 1.5 (*)     Albumin 3.2 (*)     eGFR if  54.5 (*)     eGFR if non  47.2 (*)     All other components within normal limits   PHOSPHORUS - Abnormal; Notable for the following components:    Phosphorus 2.3 (*)     All other components within normal limits    Narrative:     ADD-ON MG #[id number]; PHOS #[id number] PER  JULIET VIZCARRA MD   13:43  01/06/2020   ADD ON TACRO ORDER #[id number] PER DANYELLE PLASCENCIA  01/06/2020  13:58    PROTIME-INR   MAGNESIUM   PHOSPHORUS   TACROLIMUS LEVEL   MAGNESIUM    Narrative:     ADD-ON MG #[id number]; PHOS #[id number] PER JULIET VIZCARRA MD   13:43  01/06/2020   ADD ON TACRO ORDER #[id number] PER DANYELLE PLASCENCIA  01/06/2020  13:58    TACROLIMUS LEVEL   LACTIC ACID, PLASMA   TYPE & SCREEN   ANTIBODY IDENTIFICATION          Imaging Results    None          Medical Decision Making:   History:   Old Medical Records: I decided to obtain old medical records.  Initial Assessment:   Emergent evaluation of rectal bleeding  Differential Diagnosis:   Lower GI bleed, colitis, perforated bowel, ischemic colitis  Clinical Tests:   Lab Tests: Ordered and Reviewed  ED Management:  Patient is hemodynamically stable. Initial plan for lab work, fluid resuscitation as needed and monitoring for additional bleeding.  He has not had bleeding for several hours at this time.  He has history of multiple abdominal surgeries, but has never had diverticulitis noted. I reviewed the medical record and noted that he had a colonoscopy 5 years ago.  This did show that he had diverticulosis.  Due to his renal function and transplant status, unable to get CT scan with IV contrast.  Final disposition pending lab work and CT imaging.  Reassessment and disposition per Dr. Lomax                                 Clinical Impression:       ICD-10-CM ICD-9-CM   1. Rectal bleeding K62.5 569.3   2. Left lower quadrant abdominal pain R10.32 789.04                             Juliet Vizcarra MD  01/06/20 1433

## 2020-01-07 NOTE — ASSESSMENT & PLAN NOTE
-Concern for diverticulitis, GI on board, ok to get CT w/ contrast if he needs one for further management planning

## 2020-01-07 NOTE — ASSESSMENT & PLAN NOTE
-Takes prednisone, MMF and prograft at home  -Cr at baseline (~1.5)  -Hold MMF given concern for active infx, cont prograft and tacro, daily prograft levels, monitor for s/s of toxicity

## 2020-01-07 NOTE — PT/OT/SLP EVAL
Occupational Therapy   Evaluation and Discharge Note    Name: Alin Burkett  MRN: [id number]  Admitting Diagnosis:  Rectal bleeding      Recommendations:     Discharge Recommendations: home  Discharge Equipment Recommendations:  none  Barriers to discharge:  None    Assessment:     Alin Burkett is a 69 y.o. male with a medical diagnosis of Rectal bleeding. At this time, patient is functioning at their prior level of function and does not require further acute OT services.     Plan:     During this hospitalization, patient does not require further acute OT services.  Please re-consult if situation changes.    · Plan of Care Reviewed with: patient    Subjective     Chief Complaint: No complaints   Patient/Family Comments/goals: return home    Occupational Profile:  Living Environment: Pt lives w/ family in a [facility].  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   Equipment Used at home:  none  Assistance upon Discharge: N/A    Pain/Comfort:  · Pain Rating 1: 0/10  · Pain Rating Post-Intervention 1: 0/10    Patients cultural, spiritual, Hoahaoism conflicts given the current situation:      Objective:     Communicated with: RN prior to session.  Patient found up in chair with telemetry, peripheral IV upon OT entry to room.    General Precautions: Standard, fall   Orthopedic Precautions:N/A   Braces: N/A     Occupational Performance:      Functional Mobility/Transfers:  · Patient completed Sit <> Stand Transfer with independence  with  no assistive device   · Patient completed Bed <> Chair Transfer using Step Transfer technique with independence with no assistive device  · Functional Mobility: Ambulated indep in hallway.        Cognitive/Visual Perceptual:  Cognitive/Psychosocial Skills:     -       Oriented to: Person, Place, Time and Situation   -       Follows Commands/attention:Follows multistep  commands  -       Communication: clear/fluent  -       Memory: No Deficits  noted  -       Safety awareness/insight to disability: intact   -       Mood/Affect/Coping skills/emotional control: Appropriate to situation  Visual/Perceptual:      -Intact      Physical Exam:  Balance:    -       NO noted deficits  Postural examination/scapula alignment:    -       Rounded shoulders  Skin integrity: Visible skin intact  Upper Extremity Range of Motion:     -       Right Upper Extremity: WFL  -       Left Upper Extremity: WFL  Upper Extremity Strength:    -       Right Upper Extremity: WFL  -       Left Upper Extremity: WFL   Strength:    -       Right Upper Extremity: WFL  -       Left Upper Extremity: WFL  Fine Motor Coordination:    -       Intact  Gross motor coordination:   WFL    AMPAC 6 Click ADL:  AMPAC Total Score: 24    Treatment & Education:  Pt educated on POC.   Education:    Patient left up in chair with all lines intact and call button in reach    GOALS:   Multidisciplinary Problems     Occupational Therapy Goals     Not on file          Multidisciplinary Problems (Resolved)        Problem: Occupational Therapy Goal    Goal Priority Disciplines Outcome Interventions   Occupational Therapy Goal   (Resolved)     OT, PT/OT Met    Description:  Pt is not currently displaying a need for acute OT services. D/C acute OT services and recommend pt D/C home.                    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  Renal  allograft biopsy 1/6/17 (DIVINE): 21 glomeruli, none globally sclerosed, <5% interstitial fibrosis, no ACR, c4d negative, AVR CCT Type 2 (V1 lesion); plan THYMO     Dissecting aortic aneurysm (any part), abdominal     Diverticulosis     Encounter for blood transfusion     ESRD (end stage renal disease) 06/16/2010    H/O urethral stricture 11/27/2018    H/O: urethral stricture     History of AAA (abdominal aortic aneurysm) repair     History of urethral stricture 12/19/2018    Hypertension     Hypokalemia     Hypothyroidism 1/10/2014    Inguinal hernia bilateral, non-recurrent     Kidney stones     Organ transplant candidate 11/26/2013    Plantar warts 1/10/2014    Recurrent UTI 7/28/2017    S/P kidney transplant     Secondary hyperparathyroidism, renal     Thyroid disease        Past Surgical History:   Procedure Laterality Date    ABDOMINAL SURGERY      exploratory lapatomy x 2    ABLATION N/A 8/22/2019    Procedure: ABLATION, SVT;  Surgeon: Emerson Mcmanus MD;  Location: [facility] EP LAB;  Service: Cardiology;  Laterality: N/A;  SVT, RFA, CARTO, anes, GP, 323    AORTA - SUPERIOR MESENTERIC ARTERY BYPASS GRAFT      BLADDER NECK RECONSTRUCTION      BLADDER SURGERY      COLONOSCOPY  10/10/2013    Dr. Gutierrez, repeat in 5 years    CYSTOSCOPY      CYSTOSCOPY N/A 12/19/2018    Procedure: CYSTOSCOPY;  Surgeon: Dewey Mann MD;  Location: [facility] OR [address] [place] [address];  Service: Urology;  Laterality: N/A;  45 min    CYSTOURETHROSCOPY WITH DIRECT VISION INTERNAL URETHROTOMY N/A 12/19/2018    Procedure: CYSTOSCOPY, WITH DIRECT VISION INTERNAL URETHROTOMY;  Surgeon: Dewey Mann MD;  Location: [facility] OR [address] [place] [address];  Service: Urology;  Laterality: N/A;    DILATION OF URETHRA N/A 12/19/2018    Procedure: DILATION, URETHRA;  Surgeon: Dewey Mann MD;  Location: [facility] OR [address] [place] [address];  Service: Urology;  Laterality: N/A;    FLEXIBLE CYSTOSCOPY N/A 11/6/2019    Procedure: CYSTOSCOPY, FLEXIBLE;  Surgeon: Dewey Mann MD;   Location: [facility] OR [address] [place] [address];  Service: Urology;  Laterality: N/A;    GASTROJEJUNOSTOMY      HEMORRHOID SURGERY      HERNIA REPAIR      KIDNEY TRANSPLANT      LEFT HEART CATHETERIZATION Left 8/20/2019    Procedure: Left heart cath;  Surgeon: Javan Oscar MD;  Location: [facility] CATH/EP LAB;  Service: Cardiology;  Laterality: Left;    LITHOTRIPSY      PERCUTANEOUS NEPHROLITHOTRIPSY      right  ESWL  10/31/12    right ESWL  6/26/12    URETHROPLASTY USING PATCH GRAFT N/A 11/6/2019    Procedure: URETHROPLASTY, USING PATCH GRAFT BUCCAL MUCOSA GRAFT;  Surgeon: Dewey Mann MD;  Location: [address];  Service: Urology;  Laterality: N/A;  3 HOURS       Time Tracking:     OT Date of Treatment: 01/07/20  OT Start Time: 1014  OT Stop Time: 1028  OT Total Time (min): 14 min    Billable Minutes:Evaluation 14 minutes    Kwadwo Casas, OT  1/7/2020

## 2020-01-07 NOTE — ASSESSMENT & PLAN NOTE
Patient with previous CKD and congenital anomaly s/p Kidney transplant in 2016 and on chronic immunosuppresion presented to ED on 1/6. Cr wnl on admission and he denies any ongoing dysuria or hematuria or change in frequency. He also states he is compliant on home medications.    Home meds: Tacro, Prednisone 5, mycophenolate, fluconazole    Plan:  - Consult KTM; will call in AM - appreciate recs  - Continue home prednisone and tacro  - Currently holding home mycophenolate  - UA with reflex orderd; will f/u  - IVF bolus ordered  - Phos repletedl will follow up

## 2020-01-07 NOTE — SUBJECTIVE & OBJECTIVE
Subjective:   History of Present Illness:  69 y.o. year old Black or  male who received a DD kidney transplant (11/26/16) presumed 2/2 to HTN and congenital absent left kidney, CKD 3A, hypothyroid, HTN who we were consulted on for IS recommendations and management. Pt reports 2 days PTA he developed 2 concurrent episodes of maroon colored stool. He described them as diarrhea and about 45 min apart. Not a/w pain. He reports never having sx like this before. Pt came to ED where CT showed quationable diverticulitis +/- perf. He was started on abx and given IVF. GI was consulted. Today he reports one small dark stool however denies fevers, chills, nausea, vomiting.        Past Medical, Surgical, Family, and Social History:   Unchanged from H&P.    Scheduled Meds:   ciprofloxacin HCl  500 mg Oral Q12H    famotidine  20 mg Oral QHS    ketoconazole  100 mg Oral Daily    [START ON 1/8/2020] levothyroxine  100 mcg Oral Before breakfast    magnesium sulfate  2 g Intravenous Once    metroNIDAZOLE  500 mg Oral Q8H    predniSONE  5 mg Oral Daily    tacrolimus  2 mg Oral Daily AM    tacrolimus  3 mg Oral Daily AM     Continuous Infusions:  PRN Meds:acetaminophen, Dextrose 10% Bolus, Dextrose 10% Bolus, glucagon (human recombinant), glucose, glucose, melatonin, ondansetron, sodium chloride 0.9%    Intake/Output - Last 3 Shifts       01/05 0700 - 01/06 0659 01/06 0700 - 01/07 0659 01/07 0700 - 01/08 0659    IV Piggyback  1300     Total Intake(mL/kg)  1300 (19.1)     Net  +1300                   Review of Systems   Constitutional: Negative for activity change and appetite change.   Respiratory: Negative for chest tightness and shortness of breath.    Cardiovascular: Negative for chest pain and palpitations.   Gastrointestinal: Positive for abdominal pain and diarrhea. Negative for abdominal distention.   Genitourinary: Negative for difficulty urinating and dysuria.   Skin: Negative for color change and  "rash.   Allergic/Immunologic: Positive for immunocompromised state.   Neurological: Negative for dizziness and syncope.   Psychiatric/Behavioral: Negative for agitation and behavioral problems.      Objective:     Vital Signs (Most Recent):  Temp: 98.7 °F (37.1 °C) (01/07/20 1155)  Pulse: 98 (01/07/20 1155)  Resp: 20 (01/07/20 1155)  BP: 112/63 (01/07/20 1155)  SpO2: 98 % (01/07/20 1155) Vital Signs (24h Range):  Temp:  [97 °F (36.1 °C)-99.4 °F (37.4 °C)] 98.7 °F (37.1 °C)  Pulse:  [75-98] 98  Resp:  [18-20] 20  SpO2:  [95 %-99 %] 98 %  BP: (104-143)/(63-87) 112/63     Weight: 68 kg (150 lb)  Height: 5' 11" (180.3 cm)  Body mass index is 20.92 kg/m².    Physical Exam   Constitutional: He is oriented to person, place, and time. He appears well-developed and well-nourished.   HENT:   Head: Normocephalic and atraumatic.   Eyes: Pupils are equal, round, and reactive to light. EOM are normal.   Neck: Normal range of motion. Neck supple.   Cardiovascular: Normal rate, regular rhythm, normal heart sounds and intact distal pulses.   Pulmonary/Chest: Effort normal and breath sounds normal.   Abdominal: Soft. Bowel sounds are normal. He exhibits no distension. There is tenderness.   Musculoskeletal: Normal range of motion.   Neurological: He is alert and oriented to person, place, and time.   Skin: Skin is warm and dry.   Psychiatric: He has a normal mood and affect. His behavior is normal.       Laboratory:  CBC:   Recent Labs   Lab 01/06/20  1304 01/06/20  1859 01/07/20  0333   WBC 8.96 7.66 8.69   RBC 3.80* 3.33* 3.59*   HGB 11.4* 10.0* 10.8*   HCT 36.8* 32.1* 35.1*    159 183   MCV 97 96 98   MCH 30.0 30.0 30.1   MCHC 31.0* 31.2* 30.8*     CMP:   Recent Labs   Lab 01/06/20  1304 01/07/20  0333   GLU 86 81   CALCIUM 9.6 8.6*   ALBUMIN 3.2* 2.8*   PROT 6.8 6.2    141   K 4.0 3.5   CO2 24 22*    111*   BUN 19 15   CREATININE 1.5* 1.3   ALKPHOS 62 56   ALT 18 14   AST 23 18       Diagnostic Results:  US - " Kidney:   Results for orders placed during the hospital encounter of 08/08/19   US Transplant Kidney With Doppler    Narrative EXAMINATION:  US TRANSPLANT KIDNEY WITH DOPPLER    CLINICAL HISTORY:  elevated creatinine;Unspecified complication of kidney transplant    TECHNIQUE:  Real time gray scale and doppler ultrasound was performed over the patient's renal allograft.    COMPARISON:  None    FINDINGS:  Renal allograft in the right lower quadrant.  The allograft measures 11.7 cm. Normal perfusion. Mild hydronephrosis.  Minimal debris within the bladder.    No fluid collections.    Vasculature:    Resistive indices ranged from 0.56 to 0.62.    Main renal artery peak systolic velocity: 99cm/sec with normal waveform.    Renal artery/iliac ratio: 0.63 .    The main renal vein is patent.      Impression Satisfactory gray scale and doppler evaluation of renal allograft.  Mild hydronephrosis.      Electronically signed by: Kieran De Luna MD  Date:    08/08/2019  Time:    13:47

## 2020-01-07 NOTE — H&P
[facility] Hospital Medicine  History & Physical    Patient Name: Alin Burkett  MRN: [id number]  Admission Date: 1/6/2020  Attending Physician: Brii Naylor MD   Primary Care Provider: Carmen Krueger MD    Hospital Medicine Team: [facility] HOSP MED 1 Brenton Rodriguez MD     Patient information was obtained from patient and ER records.     Subjective:     Principal Problem:Rectal bleeding    Chief Complaint:   Chief Complaint   Patient presents with    Rectal Bleeding     very large and bright red twice, abd pain, kidney xplant 2016, not on blood thinners        HPI: Patient with history of CKD (s/p kidney transplant in 2016), chronic immunosuppression (tacro, prednisone, and mycophenolate), htn, and hypothyroidism presented to [facility] ED on 1/6/2020  following two episodes of painless blood per rectum on the same day. Patient notes that first episode was early in AM with the second event in afternoon prior to presentation. He endorsed some clotting and gross blood as well with both episodes. He reports no history of similar events but does endorse an associate LLQ pain with palpation but not at rest. He denies any fever, chills, n/v, odynophagia, post prandial pain, chest pain, sob, cough, dyspepsia, dysuria, hematuria, rash, edema, or pallor. He denies having any dark or tarry stools over the past week or any episodes of hematemesis.     Patient is a former smoker of 40 years (0.5 PPD) but quit over 10 years ago. He notes social alcohol use and no history of drug use. He lives at home and is functionally independent. Of note, he does endorse a family history of colon cancer in his brother.     Past Medical History:   Diagnosis Date    Acidosis     Adrenal adenoma     Anemia associated with chronic renal failure     Arrhythmia, onset 1995 5/1/2015    Awaiting organ transplant status 11/26/2013    Basal cell carcinoma 06/12/2012    left nasal tip    Blood type B+ 11/26/2013    Calcium  nephrolithiasis 10/16/2012    Cancer     Celiac artery dissection     Chronic diarrhea     Chronic urethral stricture     Congenital absence of kidney     left    -donor kidney transplant 16     Induced w Campath 30 mg IV intraoperatively & SoluMedrol 875 mg total over 3 days.  Renal allograft biopsy 17 (DIVINE): 21 glomeruli, none globally sclerosed, <5% interstitial fibrosis, no ACR, c4d negative, AVR CCT Type 2 (V1 lesion); plan THYMO     Dissecting aortic aneurysm (any part), abdominal     Diverticulosis     Encounter for blood transfusion     ESRD (end stage renal disease) 2010    H/O urethral stricture 2018    H/O: urethral stricture     History of AAA (abdominal aortic aneurysm) repair     History of urethral stricture 2018    Hypertension     Hypokalemia     Hypothyroidism 1/10/2014    Inguinal hernia bilateral, non-recurrent     Kidney stones     Organ transplant candidate 2013    Plantar warts 1/10/2014    Recurrent UTI 2017    S/P kidney transplant     Secondary hyperparathyroidism, renal     Thyroid disease        Past Surgical History:   Procedure Laterality Date    ABDOMINAL SURGERY      exploratory lapatomy x 2    ABLATION N/A 2019    Procedure: ABLATION, SVT;  Surgeon: Emerson Mcmanus MD;  Location: [facility] EP LAB;  Service: Cardiology;  Laterality: N/A;  SVT, RFA, CARTO, anes, GP, 323    AORTA - SUPERIOR MESENTERIC ARTERY BYPASS GRAFT      BLADDER NECK RECONSTRUCTION      BLADDER SURGERY      COLONOSCOPY  10/10/2013    Dr. Gutierrez, repeat in 5 years    CYSTOSCOPY      CYSTOSCOPY N/A 2018    Procedure: CYSTOSCOPY;  Surgeon: Dewey Mann MD;  Location: [facility] OR [address] [place] [address];  Service: Urology;  Laterality: N/A;  45 min    CYSTOURETHROSCOPY WITH DIRECT VISION INTERNAL URETHROTOMY N/A 2018    Procedure: CYSTOSCOPY, WITH DIRECT VISION INTERNAL URETHROTOMY;  Surgeon: Dewey Mann MD;  Location: [facility] OR [address] [place] [address];   Service: Urology;  Laterality: N/A;    DILATION OF URETHRA N/A 12/19/2018    Procedure: DILATION, URETHRA;  Surgeon: Dewey Mann MD;  Location: [facility] OR 1ST FLR;  Service: Urology;  Laterality: N/A;    FLEXIBLE CYSTOSCOPY N/A 11/6/2019    Procedure: CYSTOSCOPY, FLEXIBLE;  Surgeon: Dewey Mann MD;  Location: [facility] OR 2ND FLR;  Service: Urology;  Laterality: N/A;    GASTROJEJUNOSTOMY      HEMORRHOID SURGERY      HERNIA REPAIR      KIDNEY TRANSPLANT      LEFT HEART CATHETERIZATION Left 8/20/2019    Procedure: Left heart cath;  Surgeon: Javan Oscar MD;  Location: [facility] CATH/EP LAB;  Service: Cardiology;  Laterality: Left;    LITHOTRIPSY      PERCUTANEOUS NEPHROLITHOTRIPSY      right  ESWL  10/31/12    right ESWL  6/26/12    URETHROPLASTY USING PATCH GRAFT N/A 11/6/2019    Procedure: URETHROPLASTY, USING PATCH GRAFT BUCCAL MUCOSA GRAFT;  Surgeon: Dewey Mann MD;  [facility];  Service: Urology;  Laterality: N/A;  3 HOURS       Review of patient's allergies indicates:  No Known Allergies    No current facility-administered medications on file prior to encounter.      Current Outpatient Medications on File Prior to Encounter   Medication Sig    aspirin (ECOTRIN) 81 MG EC tablet Take 1 tablet (81 mg total) by mouth once daily.    calcitRIOL (ROCALTROL) 0.5 MCG Cap Take 1 capsule (0.5 mcg total) by mouth once daily.    famotidine (PEPCID) 20 MG tablet Take 1 tablet (20 mg total) by mouth every evening.    ketoconazole (NIZORAL) 200 mg Tab Take 0.5 tablets (100 mg total) by mouth once daily.    levothyroxine (SYNTHROID) 100 MCG tablet Take 1 tablet (100 mcg total) by mouth once daily.    magnesium oxide (MAG-OX) 400 mg (241.3 mg magnesium) tablet Take 1 tablet (400 mg total) by mouth once daily.    metoprolol tartrate (LOPRESSOR) 25 MG tablet Take 0.5 tablets (12.5 mg total) by mouth 2 (two) times daily.    multivitamin (ONE DAILY MULTIVITAMIN) per tablet Take 1 tablet by mouth  once daily.    mycophenolate (CELLCEPT) 250 mg Cap Take 3 capsules (750 mg total) by mouth 2 (two) times daily. Z94.0/Kidney transplant on 16    pep injection Inject 0.25 ml as directed.   May increase by 0.05 ml such as 0.30, 0.35 ml etc. Up to 1.00 ml Until adequate result is achieved.    For compounding pharmacy use:   Add PAPAVERINE 30 mcg  Add PHENTOLAMINE 10 mg  Add ALPROSTADIL 100 mcg    predniSONE (DELTASONE) 5 MG tablet Take 1 tablet (5 mg total) by mouth once daily. Z94.0/Kidney transplant on 2016    sodium bicarbonate 650 MG tablet Take 1 tablet (650 mg total) by mouth 2 (two) times daily.    tacrolimus (PROGRAF) 1 MG Cap Take 3 capsules (3 mg total) by mouth every morning AND 2 capsules (2 mg total) every evening. Z94.0/Kidney Transplant on 16.    [DISCONTINUED] amoxicillin (AMOXIL) 500 MG capsule Take 1 capsule (500 mg total) by mouth every 12 (twelve) hours.    [DISCONTINUED] FLUAD 1160-7808, 65 YR UP,,PF, 45 mcg (15 mcg x 3)/0.5 mL Syrg PHARMACIST ADMINISTERED IMMUNIZATION ADMINISTERED AT TIME OF DISPENSING    [DISCONTINUED] mycophenolate (CELLCEPT) 250 mg Cap     [DISCONTINUED] oxyCODONE (ROXICODONE) 5 MG immediate release tablet Take 1 tablet (5 mg total) by mouth every 6 (six) hours as needed for Pain.     Family History     Problem Relation (Age of Onset)    Alcohol abuse Father    Alzheimer's disease Mother    Cancer Brother    Colon cancer Brother    Diabetes Mother    HIV Brother    Kidney disease Paternal Uncle, Cousin    No Known Problems Sister, Daughter, Sister, Brother, Brother    Stroke Maternal Aunt        Tobacco Use    Smoking status: Former Smoker     Packs/day: 0.50     Years: 40.00     Pack years: 20.00     Types: Cigarettes     Last attempt to quit: 2010     Years since quittin.5    Smokeless tobacco: Never Used   Substance and Sexual Activity    Alcohol use: Yes     Comment: occasional/social    Drug use: No     Comment: THC in youth     Sexual activity: Yes     Partners: Female     Birth control/protection: None     Review of Systems   Constitutional: Negative for chills, diaphoresis, fever and unexpected weight change.   HENT: Negative for rhinorrhea, sore throat and trouble swallowing.    Eyes: Negative for redness and visual disturbance.   Respiratory: Negative for cough, shortness of breath and wheezing.    Cardiovascular: Negative for chest pain, palpitations and leg swelling.   Gastrointestinal: Positive for abdominal pain (LLQ with palpation), anal bleeding (2 episodes) and blood in stool. Negative for abdominal distention, constipation, diarrhea, nausea, rectal pain and vomiting.   Genitourinary: Negative for dysuria, flank pain and hematuria.   Musculoskeletal: Positive for back pain (lower ). Negative for myalgias.   Skin: Negative for pallor and rash.   Neurological: Negative for dizziness, light-headedness and headaches.   Psychiatric/Behavioral: Negative for confusion and decreased concentration.     Objective:     Vital Signs (Most Recent):  Temp: 98.4 °F (36.9 °C) (01/06/20 1144)  Pulse: 86 (01/06/20 1733)  Resp: 18 (01/06/20 1144)  BP: 124/77 (01/06/20 1733)  SpO2: 98 % (01/06/20 1733) Vital Signs (24h Range):  Temp:  [98.4 °F (36.9 °C)] 98.4 °F (36.9 °C)  Pulse:  [81-92] 86  Resp:  [18] 18  SpO2:  [96 %-99 %] 98 %  BP: (118-143)/(68-87) 124/77     Weight: 68 kg (150 lb)  Body mass index is 20.92 kg/m².    Physical Exam   Constitutional: He is oriented to person, place, and time. He appears well-developed and well-nourished. No distress.   HENT:   Head: Normocephalic and atraumatic.   Eyes: Conjunctivae and EOM are normal. No scleral icterus.   Neck: Normal range of motion. Neck supple. No JVD present.   Cardiovascular: Normal rate, regular rhythm, normal heart sounds and intact distal pulses.   No murmur heard.  Pulmonary/Chest: Effort normal and breath sounds normal. No stridor. No respiratory distress.   Abdominal: Soft. He  exhibits no distension. There is tenderness (LLQ). There is no rebound and no guarding.   Musculoskeletal: Normal range of motion. He exhibits no edema or tenderness.   Neurological: He is alert and oriented to person, place, and time.   Skin: Skin is warm and dry. He is not diaphoretic.   Psychiatric: He has a normal mood and affect. His behavior is normal. Judgment and thought content normal.         CRANIAL NERVES     CN III, IV, VI   Extraocular motions are normal.        Significant Labs: All pertinent labs within the past 24 hours have been reviewed.    Recent Results (from the past 24 hour(s))   CBC auto differential    Collection Time: 01/06/20  1:04 PM   Result Value Ref Range    WBC 8.96 3.90 - 12.70 K/uL    RBC 3.80 (L) 4.60 - 6.20 M/uL    Hemoglobin 11.4 (L) 14.0 - 18.0 g/dL    Hematocrit 36.8 (L) 40.0 - 54.0 %    Mean Corpuscular Volume 97 82 - 98 fL    Mean Corpuscular Hemoglobin 30.0 27.0 - 31.0 pg    Mean Corpuscular Hemoglobin Conc 31.0 (L) 32.0 - 36.0 g/dL    RDW 13.5 11.5 - 14.5 %    Platelets 186 150 - 350 K/uL    MPV 10.7 9.2 - 12.9 fL    Immature Granulocytes 0.9 (H) 0.0 - 0.5 %    Gran # (ANC) 7.2 1.8 - 7.7 K/uL    Immature Grans (Abs) 0.08 (H) 0.00 - 0.04 K/uL    Lymph # 0.9 (L) 1.0 - 4.8 K/uL    Mono # 0.6 0.3 - 1.0 K/uL    Eos # 0.1 0.0 - 0.5 K/uL    Baso # 0.03 0.00 - 0.20 K/uL    nRBC 0 0 /100 WBC    Gran% 80.1 (H) 38.0 - 73.0 %    Lymph% 10.2 (L) 18.0 - 48.0 %    Mono% 7.0 4.0 - 15.0 %    Eosinophil% 1.5 0.0 - 8.0 %    Basophil% 0.3 0.0 - 1.9 %    Differential Method Automated    Comprehensive metabolic panel    Collection Time: 01/06/20  1:04 PM   Result Value Ref Range    Sodium 141 136 - 145 mmol/L    Potassium 4.0 3.5 - 5.1 mmol/L    Chloride 109 95 - 110 mmol/L    CO2 24 23 - 29 mmol/L    Glucose 86 70 - 110 mg/dL    BUN, Bld 19 8 - 23 mg/dL    Creatinine 1.5 (H) 0.5 - 1.4 mg/dL    Calcium 9.6 8.7 - 10.5 mg/dL    Total Protein 6.8 6.0 - 8.4 g/dL    Albumin 3.2 (L) 3.5 - 5.2 g/dL     Total Bilirubin 0.6 0.1 - 1.0 mg/dL    Alkaline Phosphatase 62 55 - 135 U/L    AST 23 10 - 40 U/L    ALT 18 10 - 44 U/L    Anion Gap 8 8 - 16 mmol/L    eGFR if African American 54.5 (A) >60 mL/min/1.73 m^2    eGFR if non  47.2 (A) >60 mL/min/1.73 m^2   Protime-INR    Collection Time: 01/06/20  1:04 PM   Result Value Ref Range    Prothrombin Time 9.8 9.0 - 12.5 sec    INR 0.9 0.8 - 1.2   Type & Screen    Collection Time: 01/06/20  1:04 PM   Result Value Ref Range    Group & Rh B POS     Indirect [place] POS    Magnesium    Collection Time: 01/06/20  1:04 PM   Result Value Ref Range    Magnesium 1.7 1.6 - 2.6 mg/dL   Phosphorus    Collection Time: 01/06/20  1:04 PM   Result Value Ref Range    Phosphorus 2.3 (L) 2.7 - 4.5 mg/dL   Antibody identification    Collection Time: 01/06/20  1:04 PM   Result Value Ref Range    Antibody Identification POS    Lactic acid, plasma    Collection Time: 01/06/20  3:19 PM   Result Value Ref Range    Lactate (Lactic Acid) 1.3 0.5 - 2.2 mmol/L         Significant Imaging: I have reviewed all pertinent imaging results/findings within the past 24 hours.    Assessment/Plan:     * Rectal bleeding  Patient presented to [facility] on 1/6 following two episodes of painless blood per rectum with some stools. He notes gross blood with some clots but denies any dark or tarry stools over the past week or on day of presentation. He denies any n/v, odynophagia, post prandial pain, dyspepsia, NSAID use, dysuria, hematuria, rectal pain or rash. He states that he has never experienced this before but does have a FH of colon cancer in his brother. Unkown last colonoscopy.     CT demonstrated limited findings given lack of contrast in pt s/p kidney transplant. It showed diverticulosis with possible diverstiiiculitis. Patients bleeding likely secondary to diverticulosis with possible diverticulitis. He had possitive occult blood but otherwise had had no bleeding since presentation. It is possible  that there is ongoing bleeding. Hemoglobin was 11.4 and Lactate 1.3 on arrival.     Plan:  - IVF bolus   - Clear liquid diet  - Repeat CBC at 10pm with transfusion if necessary; may need to consult GI emergently if severe drop in Hb  - Daily CBC, CMP, Mg, Phos  - May benefit from scope in AM if continues to decrease in Hb to evaluate for ongoing bleeding  - Continue cipro and flagyll (started in ED)        Hypophosphatemia  Patient presented with phos of 2.3.     Was given oral repletion. Will continue to monitor      S/P kidney transplant  Patient with previous CKD and congenital anomaly s/p Kidney transplant in 2016 and on chronic immunosuppresion presented to ED on 1/6. Cr wnl on admission and he denies any ongoing dysuria or hematuria or change in frequency. He also states he is compliant on home medications.    Home meds: Tacro, Prednisone 5, mycophenolate, fluconazole    Plan:  - Consult KTM; will call in AM - appreciate recs  - Continue home prednisone and tacro  - Currently holding home mycophenolate  - UA with reflex orderd; will f/u  - IVF bolus ordered  - Phos repletedl will follow up      Long-term use of immunosuppressant medication  See s/p kidney transplant      Hypothyroidism  Reported history of hypothyroidism on home levothyroxine    Restarted home medication on admission      Congenital absence of kidney  See s/p kidney transplant      Essential hypertension  Patient currently normotensive and with no notes home htn medications    Will continue to monitor        VTE Risk Mitigation (From admission, onward)         Ordered     IP VTE LOW RISK PATIENT  Once      01/06/20 1852     Place sequential compression device  Until discontinued      01/06/20 1852                   Brenton Rodriguez MD  Department of Hospital Medicine   [facility]

## 2020-01-07 NOTE — PROGRESS NOTES
[facility]  Kidney Transplant  Progress Note      Reason for Follow-up: Reassessment of Kidney Transplant - 11/26/2016  (#1) recipient and management of immunosuppression.    ORGAN: LEFT KIDNEY    Donor Type: Donation after Brain Death    Donor CMV Status: Positive  Donor HBcAB:Negative  Donor HCV Status:Negative  Donor HBV RAFAEL: Negative  Donor HCV RAFAEL:       Subjective:   History of Present Illness:  69 y.o. year old Black or  male who received a DD kidney transplant (11/26/16) presumed 2/2 to HTN and congenital absent left kidney, CKD 3A, hypothyroid, HTN who we were consulted on for IS recommendations and management. Pt reports 2 days PTA he developed 2 concurrent episodes of maroon colored stool. He described them as diarrhea and about 45 min apart. Not a/w pain. He reports never having sx like this before. Pt came to ED where CT showed quationable diverticulitis +/- perf. He was started on abx and given IVF. GI was consulted. Today he reports one small dark stool however denies fevers, chills, nausea, vomiting.        Past Medical, Surgical, Family, and Social History:   Unchanged from H&P.    Scheduled Meds:   ciprofloxacin HCl  500 mg Oral Q12H    famotidine  20 mg Oral QHS    ketoconazole  100 mg Oral Daily    [START ON 1/8/2020] levothyroxine  100 mcg Oral Before breakfast    magnesium sulfate  2 g Intravenous Once    metroNIDAZOLE  500 mg Oral Q8H    predniSONE  5 mg Oral Daily    tacrolimus  2 mg Oral Daily AM    tacrolimus  3 mg Oral Daily AM     Continuous Infusions:  PRN Meds:acetaminophen, Dextrose 10% Bolus, Dextrose 10% Bolus, glucagon (human recombinant), glucose, glucose, melatonin, ondansetron, sodium chloride 0.9%    Intake/Output - Last 3 Shifts       01/05 0700 - 01/06 0659 01/06 0700 - 01/07 0659 01/07 0700 - 01/08 0659    IV Piggyback  1300     Total Intake(mL/kg)  1300 (19.1)     Net  +1300                   Review of Systems   Constitutional:  "Negative for activity change and appetite change.   Respiratory: Negative for chest tightness and shortness of breath.    Cardiovascular: Negative for chest pain and palpitations.   Gastrointestinal: Positive for abdominal pain and diarrhea. Negative for abdominal distention.   Genitourinary: Negative for difficulty urinating and dysuria.   Skin: Negative for color change and rash.   Allergic/Immunologic: Positive for immunocompromised state.   Neurological: Negative for dizziness and syncope.   Psychiatric/Behavioral: Negative for agitation and behavioral problems.      Objective:     Vital Signs (Most Recent):  Temp: 98.7 °F (37.1 °C) (01/07/20 1155)  Pulse: 98 (01/07/20 1155)  Resp: 20 (01/07/20 1155)  BP: 112/63 (01/07/20 1155)  SpO2: 98 % (01/07/20 1155) Vital Signs (24h Range):  Temp:  [97 °F (36.1 °C)-99.4 °F (37.4 °C)] 98.7 °F (37.1 °C)  Pulse:  [75-98] 98  Resp:  [18-20] 20  SpO2:  [95 %-99 %] 98 %  BP: (104-143)/(63-87) 112/63     Weight: 68 kg (150 lb)  Height: 5' 11" (180.3 cm)  Body mass index is 20.92 kg/m².    Physical Exam   Constitutional: He is oriented to person, place, and time. He appears well-developed and well-nourished.   HENT:   Head: Normocephalic and atraumatic.   Eyes: Pupils are equal, round, and reactive to light. EOM are normal.   Neck: Normal range of motion. Neck supple.   Cardiovascular: Normal rate, regular rhythm, normal heart sounds and intact distal pulses.   Pulmonary/Chest: Effort normal and breath sounds normal.   Abdominal: Soft. Bowel sounds are normal. He exhibits no distension. There is tenderness.   Musculoskeletal: Normal range of motion.   Neurological: He is alert and oriented to person, place, and time.   Skin: Skin is warm and dry.   Psychiatric: He has a normal mood and affect. His behavior is normal.       Laboratory:  CBC:   Recent Labs   Lab 01/06/20  1304 01/06/20  1859 01/07/20  0333   WBC 8.96 7.66 8.69   RBC 3.80* 3.33* 3.59*   HGB 11.4* 10.0* 10.8*   HCT " 36.8* 32.1* 35.1*    159 183   MCV 97 96 98   MCH 30.0 30.0 30.1   MCHC 31.0* 31.2* 30.8*     CMP:   Recent Labs   Lab 20  1304 20  0333   GLU 86 81   CALCIUM 9.6 8.6*   ALBUMIN 3.2* 2.8*   PROT 6.8 6.2    141   K 4.0 3.5   CO2 24 22*    111*   BUN 19 15   CREATININE 1.5* 1.3   ALKPHOS 62 56   ALT 18 14   AST 23 18       Diagnostic Results:  US - Kidney:   Results for orders placed during the hospital encounter of 19   US Transplant Kidney With Doppler    Narrative EXAMINATION:  US TRANSPLANT KIDNEY WITH DOPPLER    CLINICAL HISTORY:  elevated creatinine;Unspecified complication of kidney transplant    TECHNIQUE:  Real time gray scale and doppler ultrasound was performed over the patient's renal allograft.    COMPARISON:  None    FINDINGS:  Renal allograft in the right lower quadrant.  The allograft measures 11.7 cm. Normal perfusion. Mild hydronephrosis.  Minimal debris within the bladder.    No fluid collections.    Vasculature:    Resistive indices ranged from 0.56 to 0.62.    Main renal artery peak systolic velocity: 99cm/sec with normal waveform.    Renal artery/iliac ratio: 0.63 .    The main renal vein is patent.      Impression Satisfactory gray scale and doppler evaluation of renal allograft.  Mild hydronephrosis.      Electronically signed by: Kieran De Luna MD  Date:    2019  Time:    13:47     Assessment/Plan:     * Rectal bleeding  -Concern for diverticulitis, GI on board, ok to get CT w/ contrast if he needs one for further management planning      Recurrent UTI    -Cx pending, would cont abx as you are doing    -donor kidney transplant  -DD kidney transplant (16), induction with campath and MP,  thymo for rejection, on home MMF, pred and tacro  -Management as above      Long-term use of immunosuppressant medication  -Takes prednisone, MMF and prograft at home  -Cr at baseline (~1.5)  -Hold MMF given concern for active infx, cont prograft and  tacro, daily prograft levels, monitor for s/s of toxicity         Discharge Planning:  Pending GI workup    Brendon Naranjo MD  Kidney Transplant  [facility]

## 2020-01-07 NOTE — PLAN OF CARE
Problem: Occupational Therapy Goal  Goal: Occupational Therapy Goal  Description  Pt is not currently displaying a need for acute OT services. D/C acute OT services and recommend pt D/C home.   Outcome: Met     Kwadwo Casas OTR/L  1/7/2020

## 2020-01-07 NOTE — PLAN OF CARE
Lying in bed, w/o complaints. Complying with tx plan. 1 episode of bloody stool noted today. Primary team aware. Monitoring for bleeding. Safety maintained during stay.

## 2020-01-07 NOTE — HPI
Patient with history of CKD (s/p kidney transplant in 2016), chronic immunosuppression (tacro, prednisone, and mycophenolate), htn, and hypothyroidism presented to [facility] ED on 1/6/2020  following two episodes of painless blood per rectum on the same day. Patient notes that first episode was early in AM with the second event in afternoon prior to presentation. He endorsed some clotting and gross blood as well with both episodes. He reports no history of similar events but does endorse an associate LLQ pain with palpation but not at rest. He denies any fever, chills, n/v, odynophagia, post prandial pain, chest pain, sob, cough, dyspepsia, dysuria, hematuria, rash, edema, or pallor. He denies having any dark or tarry stools over the past week or any episodes of hematemesis.     Patient is a former smoker of 40 years (0.5 PPD) but quit over 10 years ago. He notes social alcohol use and no history of drug use. He lives at home and is functionally independent. Of note, he does endorse a family history of colon cancer in his brother.

## 2020-01-07 NOTE — HPI
69 y.o. year old Black or  male who received a DD kidney transplant (11/26/16) presumed 2/2 to HTN and congenital absent left kidney maintained of prograft, MMF and prednisone, CKD 3A, hypothyroid, HTN who we were consulted on for IS recommendations and management. Pt reports 2 days PTA he developed 2 concurrent episodes of maroon colored stool. He described them as diarrhea and about 45 min apart. A/w LLQ pain.. He reports never having sx like this before. Pt came to ED where CT showed quationable diverticulitis +/- perf. He was started on abx and given IVF. GI was consulted. Today he reports one small dark stool however denies fevers, chills, nausea, vomiting.

## 2020-01-07 NOTE — PLAN OF CARE
Problem: Physical Therapy Goal  Goal: Physical Therapy Goal  Description  PT evaluation completed.      Outcome: Met  PT evaluation complete. No goals established secondary to patient functional at their baseline with mobility and has no acute PT needs at this time. D/C from PT services.  Doris Del Castillo, PT, DPT  1/7/2020

## 2020-01-07 NOTE — ASSESSMENT & PLAN NOTE
-DD kidney transplant (11/26/16), induction with campath and MP, 1/17 thymo for rejection, on home MMF, pred and tacro  -Management as above

## 2020-01-07 NOTE — ASSESSMENT & PLAN NOTE
Patient currently normotensive and with no notes home htn medications    Will continue to monitor

## 2020-01-07 NOTE — ASSESSMENT & PLAN NOTE
Reported history of hypothyroidism on home levothyroxine    Restarted home medication on admission

## 2020-01-07 NOTE — PT/OT/SLP EVAL
Physical Therapy Evaluation and Discharge Note    Patient Name:  Alin Burkett   MRN:  [id number]    Recommendations:     Discharge Recommendations:  home   Discharge Equipment Recommendations: none   Barriers to discharge: None    Assessment:     Alin Burkett is a 69 y.o. male admitted with a medical diagnosis of Rectal bleeding. .  At this time, patient is functioning at their prior level of function and does not require further acute PT services.     Recent Surgery: * No surgery found *      Plan:     During this hospitalization, patient does not require further acute PT services.  Please re-consult if situation changes.      Subjective     Chief Complaint: none  Patient/Family Comments/goals:  To go home  Pain/Comfort:  · Pain Rating 1: 0/10  · Pain Rating Post-Intervention 1: 0/10    Patients cultural, spiritual, Sabianism conflicts given the current situation: no    Living Environment:  Patient lives with spouse and MIL in a [facility]. He is an active  and (I) PTA. He cares for his wife and mother-in-law. He owns no DME.   Prior to admission, patients level of function was independent.  Equipment used at home: none.  DME owned (not currently used): none.  Upon discharge, patient will have assistance from family.    Objective:     Communicated with RN prior to session.  Patient found up in chair with telemetry, peripheral IV upon PT entry to room.    General Precautions: Standard,     Orthopedic Precautions:N/A   Braces: N/A     Exams:  · Cognitive Exam:  Patient is oriented to Person, Place, Time and Situation  · Gross Motor Coordination:  WFL  · Postural Exam:  Patient presented with the following abnormalities:    · -       No postural abnormalities identified  · Sensation:    · -       Intact  light/touch BLE  · RLE ROM: WFL  · RLE Strength: WFL  · LLE ROM: WFL  · LLE Strength: WFL    Functional Mobility:  · Transfers:     · Sit to Stand:  independence with no AD  · Gait: ~300ft with (I); no  notable gait deviations   · Balance: good balance throughout     AM-PAC 6 CLICK MOBILITY  Total Score:24       Therapeutic Activities and Exercises:   Patient educated on role and goal of PT   White board updated   Questions and concerns answered within PT scope     AM-PAC 6 CLICK MOBILITY  Total Score:24     Patient left up in chair with all lines intact, call button in reach and RN notified.    GOALS:   Multidisciplinary Problems     Physical Therapy Goals     Not on file          Multidisciplinary Problems (Resolved)        Problem: Physical Therapy Goal    Goal Priority Disciplines Outcome Goal Variances Interventions   Physical Therapy Goal   (Resolved)     PT, PT/OT Met     Description:  PT evaluation completed.                       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  Renal allograft biopsy 17 (DIVINE): 21 glomeruli, none globally sclerosed, <5% interstitial fibrosis, no ACR, c4d negative, AVR CCT Type 2 (V1 lesion); plan THYMO     Dissecting aortic aneurysm (any part), abdominal     Diverticulosis     Encounter for blood transfusion     ESRD (end stage renal disease) 2010    H/O urethral stricture 2018    H/O: urethral stricture     History of AAA (abdominal aortic aneurysm) repair     History of urethral stricture 2018    Hypertension     Hypokalemia     Hypothyroidism 1/10/2014    Inguinal hernia bilateral,  non-recurrent     Kidney stones     Organ transplant candidate 11/26/2013    Plantar warts 1/10/2014    Recurrent UTI 7/28/2017    S/P kidney transplant     Secondary hyperparathyroidism, renal     Thyroid disease        Past Surgical History:   Procedure Laterality Date    ABDOMINAL SURGERY      exploratory lapatomy x 2    ABLATION N/A 8/22/2019    Procedure: ABLATION, SVT;  Surgeon: Emerson Mcmanus MD;  Location: [facility] EP LAB;  Service: Cardiology;  Laterality: N/A;  SVT, RFA, CARTO, anes, GP, 323    AORTA - SUPERIOR MESENTERIC ARTERY BYPASS GRAFT      BLADDER NECK RECONSTRUCTION      BLADDER SURGERY      COLONOSCOPY  10/10/2013    Dr. Gutierrez, repeat in 5 years    CYSTOSCOPY      CYSTOSCOPY N/A 12/19/2018    Procedure: CYSTOSCOPY;  Surgeon: Dewey Mann MD;  Location: [facility] OR 1ST FLR;  Service: Urology;  Laterality: N/A;  45 min    CYSTOURETHROSCOPY WITH DIRECT VISION INTERNAL URETHROTOMY N/A 12/19/2018    Procedure: CYSTOSCOPY, WITH DIRECT VISION INTERNAL URETHROTOMY;  Surgeon: Dewey Mann MD;  Location: [facility] OR 1ST FLR;  Service: Urology;  Laterality: N/A;    DILATION OF URETHRA N/A 12/19/2018    Procedure: DILATION, URETHRA;  Surgeon: Dewey Mann MD;  Location: [facility] OR 1ST FLR;  Service: Urology;  Laterality: N/A;    FLEXIBLE CYSTOSCOPY N/A 11/6/2019    Procedure: CYSTOSCOPY, FLEXIBLE;  Surgeon: Dewey Mann MD;  Location: [facility] OR 2ND FLR;  Service: Urology;  Laterality: N/A;    GASTROJEJUNOSTOMY      HEMORRHOID SURGERY      HERNIA REPAIR      KIDNEY TRANSPLANT      LEFT HEART CATHETERIZATION Left 8/20/2019    Procedure: Left heart cath;  Surgeon: Javan Oscar MD;  Location: [facility] CATH/EP LAB;  Service: Cardiology;  Laterality: Left;    LITHOTRIPSY      PERCUTANEOUS NEPHROLITHOTRIPSY      right  ESWL  10/31/12    right ESWL  6/26/12    URETHROPLASTY USING PATCH GRAFT N/A 11/6/2019    Procedure: URETHROPLASTY, USING PATCH GRAFT BUCCAL MUCOSA GRAFT;  Surgeon: Dewey DUMONT  MD Johnathan;  Location: [facility] OR [address] [place] [address];  Service: Urology;  Laterality: N/A;  3 HOURS       Time Tracking:     PT Received On: 01/07/20  PT Start Time: 1014     PT Stop Time: 1023  PT Total Time (min): 9 min     Billable Minutes: Evaluation 9 min      Doris Del Castillo, PT  01/07/2020

## 2020-01-07 NOTE — SUBJECTIVE & OBJECTIVE
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  Renal allograft biopsy 17 (DIVINE): 21 glomeruli, none globally sclerosed, <5% interstitial fibrosis, no ACR, c4d negative, AVR CCT Type 2 (V1 lesion); plan THYMO     Dissecting aortic aneurysm (any part), abdominal     Diverticulosis     Encounter for blood transfusion     ESRD (end stage renal disease) 2010    H/O urethral stricture 2018    H/O: urethral stricture     History of AAA (abdominal aortic aneurysm) repair     History of urethral stricture 2018    Hypertension     Hypokalemia     Hypothyroidism 1/10/2014    Inguinal hernia bilateral, non-recurrent     Kidney stones     Organ transplant candidate 2013    Plantar warts 1/10/2014    Recurrent UTI 2017    S/P kidney transplant     Secondary hyperparathyroidism, renal     Thyroid disease        Past Surgical History:   Procedure Laterality Date    ABDOMINAL SURGERY      exploratory lapatomy x 2    ABLATION N/A 2019    Procedure: ABLATION, SVT;  Surgeon: Emerson Mcmanus MD;  Location: [facility];  Service: Cardiology;  Laterality: N/A;  SVT, RFA, CARTO, anes, GP, 323    AORTA - SUPERIOR MESENTERIC ARTERY BYPASS GRAFT      BLADDER NECK RECONSTRUCTION      BLADDER SURGERY      COLONOSCOPY  10/10/2013    Dr. Gutierrez, repeat in 5 years    CYSTOSCOPY      CYSTOSCOPY N/A 2018    Procedure: CYSTOSCOPY;   Surgeon: Dewey Mann MD;  Location: [facility] OR [address] [place] [address];  Service: Urology;  Laterality: N/A;  45 min    CYSTOURETHROSCOPY WITH DIRECT VISION INTERNAL URETHROTOMY N/A 12/19/2018    Procedure: CYSTOSCOPY, WITH DIRECT VISION INTERNAL URETHROTOMY;  Surgeon: Dewey Mann MD;  [address]  Service: Urology;  Laterality: N/A;    DILATION OF URETHRA N/A 12/19/2018    Procedure: DILATION, URETHRA;  Surgeon: Dewey Mann MD;  [facility];  Service: Urology;  Laterality: N/A;    FLEXIBLE CYSTOSCOPY N/A 11/6/2019    Procedure: CYSTOSCOPY, FLEXIBLE;  Surgeon: Dewey Mann MD;  Location: [facility] OR [address] [place] [address];  Service: Urology;  Laterality: N/A;    GASTROJEJUNOSTOMY      HEMORRHOID SURGERY      HERNIA REPAIR      KIDNEY TRANSPLANT      LEFT HEART CATHETERIZATION Left 8/20/2019    Procedure: Left heart cath;  Surgeon: Javan Oscar MD;  [facility] CATH/EP LAB;  Service: Cardiology;  Laterality: Left;    LITHOTRIPSY      PERCUTANEOUS NEPHROLITHOTRIPSY      right  ESWL  10/31/12    right ESWL  6/26/12    URETHROPLASTY USING PATCH GRAFT N/A 11/6/2019    Procedure: URETHROPLASTY, USING PATCH GRAFT BUCCAL MUCOSA GRAFT;  Surgeon: Dewey Mann MD;  Location: [address];  Service: Urology;  Laterality: N/A;  3 HOURS       Review of patient's allergies indicates:  No Known Allergies    No current facility-administered medications on file prior to encounter.      Current Outpatient Medications on File Prior to Encounter   Medication Sig    aspirin (ECOTRIN) 81 MG EC tablet Take 1 tablet (81 mg total) by mouth once daily.    calcitRIOL (ROCALTROL) 0.5 MCG Cap Take 1 capsule (0.5 mcg total) by mouth once daily.    famotidine (PEPCID) 20 MG tablet Take 1 tablet (20 mg total) by mouth every evening.    ketoconazole (NIZORAL) 200 mg Tab Take 0.5 tablets (100 mg total) by mouth once daily.    levothyroxine (SYNTHROID) 100 MCG tablet Take 1 tablet (100 mcg total) by mouth once daily.     magnesium oxide (MAG-OX) 400 mg (241.3 mg magnesium) tablet Take 1 tablet (400 mg total) by mouth once daily.    metoprolol tartrate (LOPRESSOR) 25 MG tablet Take 0.5 tablets (12.5 mg total) by mouth 2 (two) times daily.    multivitamin (ONE DAILY MULTIVITAMIN) per tablet Take 1 tablet by mouth once daily.    mycophenolate (CELLCEPT) 250 mg Cap Take 3 capsules (750 mg total) by mouth 2 (two) times daily. Z94.0/Kidney transplant on 11/26/16    pep injection Inject 0.25 ml as directed.   May increase by 0.05 ml such as 0.30, 0.35 ml etc. Up to 1.00 ml Until adequate result is achieved.    For compounding pharmacy use:   Add PAPAVERINE 30 mcg  Add PHENTOLAMINE 10 mg  Add ALPROSTADIL 100 mcg    predniSONE (DELTASONE) 5 MG tablet Take 1 tablet (5 mg total) by mouth once daily. Z94.0/Kidney transplant on 11/26/2016    sodium bicarbonate 650 MG tablet Take 1 tablet (650 mg total) by mouth 2 (two) times daily.    tacrolimus (PROGRAF) 1 MG Cap Take 3 capsules (3 mg total) by mouth every morning AND 2 capsules (2 mg total) every evening. Z94.0/Kidney Transplant on 11/26/16.    [DISCONTINUED] amoxicillin (AMOXIL) 500 MG capsule Take 1 capsule (500 mg total) by mouth every 12 (twelve) hours.    [DISCONTINUED] FLUAD 0118-4426, 65 YR UP,,PF, 45 mcg (15 mcg x 3)/0.5 mL Syrg PHARMACIST ADMINISTERED IMMUNIZATION ADMINISTERED AT TIME OF DISPENSING    [DISCONTINUED] mycophenolate (CELLCEPT) 250 mg Cap     [DISCONTINUED] oxyCODONE (ROXICODONE) 5 MG immediate release tablet Take 1 tablet (5 mg total) by mouth every 6 (six) hours as needed for Pain.     Family History     Problem Relation (Age of Onset)    Alcohol abuse Father    Alzheimer's disease Mother    Cancer Brother    Colon cancer Brother    Diabetes Mother    HIV Brother    Kidney disease Paternal Uncle, Cousin    No Known Problems Sister, Daughter, Sister, Brother, Brother    Stroke Maternal Aunt        Tobacco Use    Smoking status: Former Smoker      Packs/day: 0.50     Years: 40.00     Pack years: 20.00     Types: Cigarettes     Last attempt to quit: 2010     Years since quittin.5    Smokeless tobacco: Never Used   Substance and Sexual Activity    Alcohol use: Yes     Comment: occasional/social    Drug use: No     Comment: THC in youth    Sexual activity: Yes     Partners: Female     Birth control/protection: None     Review of Systems   Constitutional: Negative for chills, diaphoresis, fever and unexpected weight change.   HENT: Negative for rhinorrhea, sore throat and trouble swallowing.    Eyes: Negative for redness and visual disturbance.   Respiratory: Negative for cough, shortness of breath and wheezing.    Cardiovascular: Negative for chest pain, palpitations and leg swelling.   Gastrointestinal: Positive for abdominal pain (LLQ with palpation), anal bleeding (2 episodes) and blood in stool. Negative for abdominal distention, constipation, diarrhea, nausea, rectal pain and vomiting.   Genitourinary: Negative for dysuria, flank pain and hematuria.   Musculoskeletal: Positive for back pain (lower ). Negative for myalgias.   Skin: Negative for pallor and rash.   Neurological: Negative for dizziness, light-headedness and headaches.   Psychiatric/Behavioral: Negative for confusion and decreased concentration.     Objective:     Vital Signs (Most Recent):  Temp: 98.4 °F (36.9 °C) (20 1144)  Pulse: 86 (20 1733)  Resp: 18 (20 1144)  BP: 124/77 (20 1733)  SpO2: 98 % (20 1733) Vital Signs (24h Range):  Temp:  [98.4 °F (36.9 °C)] 98.4 °F (36.9 °C)  Pulse:  [81-92] 86  Resp:  [18] 18  SpO2:  [96 %-99 %] 98 %  BP: (118-143)/(68-87) 124/77     Weight: 68 kg (150 lb)  Body mass index is 20.92 kg/m².    Physical Exam   Constitutional: He is oriented to person, place, and time. He appears well-developed and well-nourished. No distress.   HENT:   Head: Normocephalic and atraumatic.   Eyes: Conjunctivae and EOM are normal. No  scleral icterus.   Neck: Normal range of motion. Neck supple. No JVD present.   Cardiovascular: Normal rate, regular rhythm, normal heart sounds and intact distal pulses.   No murmur heard.  Pulmonary/Chest: Effort normal and breath sounds normal. No stridor. No respiratory distress.   Abdominal: Soft. He exhibits no distension. There is tenderness (LLQ). There is no rebound and no guarding.   Musculoskeletal: Normal range of motion. He exhibits no edema or tenderness.   Neurological: He is alert and oriented to person, place, and time.   Skin: Skin is warm and dry. He is not diaphoretic.   Psychiatric: He has a normal mood and affect. His behavior is normal. Judgment and thought content normal.         CRANIAL NERVES     CN III, IV, VI   Extraocular motions are normal.        Significant Labs: All pertinent labs within the past 24 hours have been reviewed.    Recent Results (from the past 24 hour(s))   CBC auto differential    Collection Time: 01/06/20  1:04 PM   Result Value Ref Range    WBC 8.96 3.90 - 12.70 K/uL    RBC 3.80 (L) 4.60 - 6.20 M/uL    Hemoglobin 11.4 (L) 14.0 - 18.0 g/dL    Hematocrit 36.8 (L) 40.0 - 54.0 %    Mean Corpuscular Volume 97 82 - 98 fL    Mean Corpuscular Hemoglobin 30.0 27.0 - 31.0 pg    Mean Corpuscular Hemoglobin Conc 31.0 (L) 32.0 - 36.0 g/dL    RDW 13.5 11.5 - 14.5 %    Platelets 186 150 - 350 K/uL    MPV 10.7 9.2 - 12.9 fL    Immature Granulocytes 0.9 (H) 0.0 - 0.5 %    Gran # (ANC) 7.2 1.8 - 7.7 K/uL    Immature Grans (Abs) 0.08 (H) 0.00 - 0.04 K/uL    Lymph # 0.9 (L) 1.0 - 4.8 K/uL    Mono # 0.6 0.3 - 1.0 K/uL    Eos # 0.1 0.0 - 0.5 K/uL    Baso # 0.03 0.00 - 0.20 K/uL    nRBC 0 0 /100 WBC    Gran% 80.1 (H) 38.0 - 73.0 %    Lymph% 10.2 (L) 18.0 - 48.0 %    Mono% 7.0 4.0 - 15.0 %    Eosinophil% 1.5 0.0 - 8.0 %    Basophil% 0.3 0.0 - 1.9 %    Differential Method Automated    Comprehensive metabolic panel    Collection Time: 01/06/20  1:04 PM   Result Value Ref Range    Sodium 141  136 - 145 mmol/L    Potassium 4.0 3.5 - 5.1 mmol/L    Chloride 109 95 - 110 mmol/L    CO2 24 23 - 29 mmol/L    Glucose 86 70 - 110 mg/dL    BUN, Bld 19 8 - 23 mg/dL    Creatinine 1.5 (H) 0.5 - 1.4 mg/dL    Calcium 9.6 8.7 - 10.5 mg/dL    Total Protein 6.8 6.0 - 8.4 g/dL    Albumin 3.2 (L) 3.5 - 5.2 g/dL    Total Bilirubin 0.6 0.1 - 1.0 mg/dL    Alkaline Phosphatase 62 55 - 135 U/L    AST 23 10 - 40 U/L    ALT 18 10 - 44 U/L    Anion Gap 8 8 - 16 mmol/L    eGFR if African American 54.5 (A) >60 mL/min/1.73 m^2    eGFR if non  47.2 (A) >60 mL/min/1.73 m^2   Protime-INR    Collection Time: 01/06/20  1:04 PM   Result Value Ref Range    Prothrombin Time 9.8 9.0 - 12.5 sec    INR 0.9 0.8 - 1.2   Type & Screen    Collection Time: 01/06/20  1:04 PM   Result Value Ref Range    Group & Rh B POS     Indirect [place] POS    Magnesium    Collection Time: 01/06/20  1:04 PM   Result Value Ref Range    Magnesium 1.7 1.6 - 2.6 mg/dL   Phosphorus    Collection Time: 01/06/20  1:04 PM   Result Value Ref Range    Phosphorus 2.3 (L) 2.7 - 4.5 mg/dL   Antibody identification    Collection Time: 01/06/20  1:04 PM   Result Value Ref Range    Antibody Identification POS    Lactic acid, plasma    Collection Time: 01/06/20  3:19 PM   Result Value Ref Range    Lactate (Lactic Acid) 1.3 0.5 - 2.2 mmol/L         Significant Imaging: I have reviewed all pertinent imaging results/findings within the past 24 hours.

## 2020-01-07 NOTE — PROGRESS NOTES
[facility]  Kidney Transplant  Progress Note      Reason for Follow-up: Reassessment of Kidney Transplant - 2016  (#1) recipient and management of immunosuppression.    ORGAN: LEFT KIDNEY    Donor Type: Donation after Brain Death    Donor CMV Status: Positive  Donor HBcAB:Negative  Donor HCV Status:Negative  Donor HBV RAFAEL: Negative  Donor HCV RAFAEL:     No new subjective & objective note has been filed under this hospital service since the last note was generated.    Assessment/Plan:     * Rectal bleeding  -Concern for diverticulitis, GI on board, ok to get CT w/ contrast if he needs one for further management planning      Long-term use of immunosuppressant medication  -Takes prednisone, MMF and prograft at home  -Cr at baseline (~1.5)  -Hold MMF given concern for active infx, cont prograft and tacro, daily prograft levels, monitor for s/s of toxicity     -donor kidney transplant  -DD kidney transplant (16), induction with campath and MP,  thymo for rejection, on home MMF, pred and tacro  -Management as above      Recurrent UTI    -Cx pending, would cont abx as you are doing        Discharge Planning:  Pending GI workup    Brendon Naranjo MD  Kidney Transplant  [facility]

## 2020-01-07 NOTE — ASSESSMENT & PLAN NOTE
Patient presented to [facility] on 1/6 following two episodes of painless blood per rectum with some stools. He notes gross blood with some clots but denies any dark or tarry stools over the past week or on day of presentation. He denies any n/v, odynophagia, post prandial pain, dyspepsia, NSAID use, dysuria, hematuria, rectal pain or rash. He states that he has never experienced this before but does have a FH of colon cancer in his brother. Unkown last colonoscopy.     CT demonstrated limited findings given lack of contrast in pt s/p kidney transplant. It showed diverticulosis with possible diverstiiiculitis. Patients bleeding likely secondary to diverticulosis with possible diverticulitis. He had possitive occult blood but otherwise had had no bleeding since presentation. It is possible that there is ongoing bleeding. Hemoglobin was 11.4 and Lactate 1.3 on arrival.     Plan:  - IVF bolus   - Clear liquid diet  - Repeat CBC at 10pm with transfusion if necessary; may need to consult GI emergently if severe drop in Hb  - Daily CBC, CMP, Mg, Phos  - May benefit from scope in AM if continues to decrease in Hb to evaluate for ongoing bleeding  - Continue cipro and flagyll (started in ED)

## 2020-01-08 NOTE — PROGRESS NOTES
[facility]  Kidney Transplant  Progress Note      Reason for Follow-up: Reassessment of Kidney Transplant - 11/26/2016  (#1) recipient and management of immunosuppression.    ORGAN: LEFT KIDNEY    Donor Type: Donation after Brain Death        Subjective:   History of Present Illness:  69 y.o. year old Black or  male who received a DD kidney transplant (11/26/16) presumed 2/2 to HTN and congenital absent left kidney maintained of prograft, MMF and prednisone, CKD 3A, hypothyroid, HTN who we were consulted on for IS recommendations and management. Pt reports 2 days PTA he developed 2 concurrent episodes of maroon colored stool. He described them as diarrhea and about 45 min apart. A/w LLQ pain.. He reports never having sx like this before. Pt came to ED where CT showed quationable diverticulitis +/- perf. He was started on abx and given IVF. GI was consulted. Today he reports one small dark stool however denies fevers, chills, nausea, vomiting.      Hospital Course:  No notes on file    Interval History:  abd pain improved. Reports one small episode of dark stool. Otherwise says he is doing better    Past Medical, Surgical, Family, and Social History:   Unchanged from H&P.    Scheduled Meds:   ciprofloxacin HCl  500 mg Oral Q12H    famotidine  20 mg Oral QHS    ketoconazole  100 mg Oral Daily    levothyroxine  100 mcg Oral Before breakfast    metroNIDAZOLE  500 mg Oral Q8H    potassium chloride  40 mEq Oral Q2H    predniSONE  5 mg Oral Daily    tacrolimus  2 mg Oral Daily PM    tacrolimus  3 mg Oral Daily AM     Continuous Infusions:   sodium chloride 0.9% 75 mL/hr at 01/07/20 1504     PRN Meds:acetaminophen, Dextrose 10% Bolus, Dextrose 10% Bolus, glucagon (human recombinant), glucose, glucose, melatonin, ondansetron, sodium chloride 0.9%    Intake/Output - Last 3 Shifts       01/06 0700 - 01/07 0659 01/07 0700 - 01/08 0659 01/08 0700 - 01/09 0659    P.O.  720     I.V.  "(mL/kg)  450 (6.6)     IV Piggyback 1300      Total Intake(mL/kg) 1300 (19.1) 1170 (17.2)     Urine (mL/kg/hr)  2 (0)     Stool  0     Total Output  2     Net +1300 +1168            Urine Occurrence  2 x     Stool Occurrence  2 x            Review of Systems   Constitutional: Negative for activity change and appetite change.   Respiratory: Negative for chest tightness and shortness of breath.    Cardiovascular: Negative for chest pain and palpitations.   Gastrointestinal: Negative for abdominal distention, abdominal pain and diarrhea.   Genitourinary: Negative for difficulty urinating and dysuria.   Skin: Negative for color change and rash.   Allergic/Immunologic: Positive for immunocompromised state.   Neurological: Negative for dizziness and syncope.   Psychiatric/Behavioral: Negative for agitation and behavioral problems.      Objective:     Vital Signs (Most Recent):  Temp: 97.7 °F (36.5 °C) (01/08/20 0816)  Pulse: 67 (01/08/20 0816)  Resp: 18 (01/08/20 0816)  BP: 116/77 (01/08/20 0816)  SpO2: (!) 94 % (01/08/20 0816) Vital Signs (24h Range):  Temp:  [97.7 °F (36.5 °C)-98.8 °F (37.1 °C)] 97.7 °F (36.5 °C)  Pulse:  [67-99] 67  Resp:  [16-20] 18  SpO2:  [94 %-99 %] 94 %  BP: (112-124)/(63-77) 116/77     Weight: 68 kg (150 lb)  Height: 5' 11" (180.3 cm)  Body mass index is 20.92 kg/m².    Physical Exam   Constitutional: He is oriented to person, place, and time. He appears well-developed and well-nourished.   HENT:   Head: Normocephalic and atraumatic.   Eyes: Pupils are equal, round, and reactive to light. EOM are normal.   Neck: Normal range of motion. Neck supple.   Cardiovascular: Normal rate, regular rhythm, normal heart sounds and intact distal pulses.   Pulmonary/Chest: Effort normal and breath sounds normal.   Abdominal: Soft. Bowel sounds are normal. He exhibits no distension. There is no tenderness.   Musculoskeletal: Normal range of motion.   Neurological: He is alert and oriented to person, place, and " time.   Skin: Skin is warm and dry.   Psychiatric: He has a normal mood and affect. His behavior is normal.       Laboratory:  CBC:   Recent Labs   Lab 20  1859 20  0333 20  0358   WBC 7.66 8.69 6.79   RBC 3.33* 3.59* 3.22*   HGB 10.0* 10.8* 9.7*   HCT 32.1* 35.1* 31.3*    183 181   MCV 96 98 97   MCH 30.0 30.1 30.1   MCHC 31.2* 30.8* 31.0*     CMP:   Recent Labs   Lab 20  1304 20  0333 20  0358   GLU 86 81 91   CALCIUM 9.6 8.6* 8.1*   ALBUMIN 3.2* 2.8* 2.6*   PROT 6.8 6.2 5.6*    141 139   K 4.0 3.5 3.2*   CO2 24 22* 19*    111* 113*   BUN 19 15 13   CREATININE 1.5* 1.3 1.3   ALKPHOS 62 56 60   ALT 18 14 14   AST 23 18 17       Diagnostic Results:  CT - Abd/Pelvic: No results found. However, due to the size of the patient record, not all encounters were searched. Please check Results Review for a complete set of results.    Assessment/Plan:     * Rectal bleeding  -CT scan suspicious for diverticulitis, on flagyl and cipro, management per primary      Long-term use of immunosuppressant medication  -Takes prednisone, MMF and prograft at home  -Cr at baseline (~1.5)  -Hold MMF given concern for active infx until abx finished, cont prograft and tacro, daily prograft levels, monitor for s/s of toxicity     -donor kidney transplant  -DD kidney transplant (16), induction with campath and MP,  thymo for rejection, on home MMF, pred and tacro  -Management as above      Recurrent UTI  -Cx with gram neg rods.  -Would not discharge until sensitivites come back, would cont current abx          Brendon Naranjo MD  Kidney Transplant  [facility]

## 2020-01-08 NOTE — HOSPITAL COURSE
Patient with kidney transplant and on chronic immunosuppression was admitted for 2 episodes of painless blood per rectum with associated LLQ pain on palpation at home. On arrival he was noted to have positive fecal occult and with unknown last colonoscopy. He noted no fever chills, nausea, vomiting, confusion, LH, hemoptysis, hematemesis, chest pain, sob, hematuria, dysuria. Over hospital course, he had one additional episode but with stable hb and no leukocytosis. CT without contrast demonstrated diverticulosis with possible diverticulitis. GI consulted and said no intervention at this time was indicated but will follow up outpatient. Patient was started on cipro and flagyll. KTM consulted and recommended continuing patients home immunosuppression regimen. They also noted that patient should have a CT with contrast and 48 hours of IVF thereafter to avoid DIVINE given his kidney transplant history so that the patient can CT with contrast was suspicious for diverticulitis. Of note, he was found to have >100,000 CFU of gram negative bacteria on UCx following a dirty UA. KTM recommended waiting for speciation and sensitivities prior to discharging patient given his immunosuppressed state. Cultures with Pseudomonas sensitive to Ciprofloxacin. Patient to complete 10 day course of Ciprofloxacin and 7 day course of Metronidazole. Patient to resume cellcept after antibiotic completion.

## 2020-01-08 NOTE — PLAN OF CARE
Patient is lying in bed with eyes closed.  Breathing is even and unlabored, bed is low and locked, and call light is within reach.  Will continue to monitor until arrival of day shift.

## 2020-01-08 NOTE — SUBJECTIVE & OBJECTIVE
Interval History: Patient with one episode of blood per rectum overnight. He states that it was painless and without clots. He still is having LLQ pain with palpation butotherwise denies any other abdominal pain, fever, chills, n/v, chest pain, dysuria, hematuria, rash, edema or hematemesis.     Review of Systems   Constitutional: Negative for chills, diaphoresis, fatigue and fever.   HENT: Negative for sore throat and trouble swallowing.    Eyes: Negative for redness and visual disturbance.   Respiratory: Negative for cough, shortness of breath and wheezing.    Cardiovascular: Negative for chest pain, palpitations and leg swelling.   Gastrointestinal: Positive for abdominal pain (LLQ with palpation), anal bleeding and blood in stool. Negative for abdominal distention, constipation, diarrhea, nausea, rectal pain and vomiting.   Genitourinary: Negative for dysuria, flank pain and hematuria.   Musculoskeletal: Negative for gait problem and myalgias.   Skin: Negative for color change, pallor and rash.   Neurological: Negative for light-headedness and headaches.   Psychiatric/Behavioral: Negative for confusion and decreased concentration.     Objective:     Vital Signs (Most Recent):  Temp: 98.2 °F (36.8 °C) (01/07/20 1556)  Pulse: 96 (01/07/20 1556)  Resp: 18 (01/07/20 1556)  BP: 124/68 (01/07/20 1556)  SpO2: 96 % (01/07/20 1556) Vital Signs (24h Range):  Temp:  [97 °F (36.1 °C)-99.4 °F (37.4 °C)] 98.2 °F (36.8 °C)  Pulse:  [75-98] 96  Resp:  [18-20] 18  SpO2:  [95 %-99 %] 96 %  BP: (104-139)/(63-84) 124/68     Weight: 68 kg (150 lb)  Body mass index is 20.92 kg/m².    Intake/Output Summary (Last 24 hours) at 1/7/2020 1801  Last data filed at 1/7/2020 1300  Gross per 24 hour   Intake 1070 ml   Output 1 ml   Net 1069 ml      Physical Exam   Constitutional: He is oriented to person, place, and time. He appears well-developed and well-nourished. No distress.   HENT:   Head: Normocephalic and atraumatic.   Eyes:  Conjunctivae and EOM are normal. No scleral icterus.   Neck: Normal range of motion. Neck supple. No JVD present.   Cardiovascular: Normal rate, regular rhythm, normal heart sounds and intact distal pulses.   No murmur heard.  Pulmonary/Chest: Effort normal and breath sounds normal. No respiratory distress. He has no wheezes.   Abdominal: Soft. He exhibits no distension. There is tenderness (LLQ). There is no rebound and no guarding.   Musculoskeletal: Normal range of motion. He exhibits no edema or tenderness.   Neurological: He is alert and oriented to person, place, and time.   Skin: Skin is warm and dry. He is not diaphoretic. No erythema. No pallor.   Psychiatric: He has a normal mood and affect. His behavior is normal. Judgment and thought content normal.       Significant Labs: All pertinent labs within the past 24 hours have been reviewed.    Recent Results (from the past 24 hour(s))   TSH    Collection Time: 01/06/20  6:59 PM   Result Value Ref Range    TSH 15.087 (H) 0.400 - 4.000 uIU/mL   Hemoglobin A1c    Collection Time: 01/06/20  6:59 PM   Result Value Ref Range    Hemoglobin A1C 5.3 4.0 - 5.6 %    Estimated Avg Glucose 105 68 - 131 mg/dL   Iron and TIBC    Collection Time: 01/06/20  6:59 PM   Result Value Ref Range    Iron 32 (L) 45 - 160 ug/dL    Transferrin 154 (L) 200 - 375 mg/dL    TIBC 228 (L) 250 - 450 ug/dL    Saturated Iron 14 (L) 20 - 50 %   Ferritin    Collection Time: 01/06/20  6:59 PM   Result Value Ref Range    Ferritin 1,646 (H) 20.0 - 300.0 ng/mL   Urinalysis, Reflex to Urine Culture Urine, Clean Catch    Collection Time: 01/06/20  6:59 PM   Result Value Ref Range    Specimen UA Urine, Clean Catch     Color, UA Yellow Yellow, Straw, Tati    Appearance, UA Hazy (A) Clear    pH, UA 6.0 5.0 - 8.0    Specific Gravity, UA 1.010 1.005 - 1.030    Protein, UA Negative Negative    Glucose, UA Negative Negative    Ketones, UA Negative Negative    Bilirubin (UA) Negative Negative    Occult Blood  UA 1+ (A) Negative    Nitrite, UA Positive (A) Negative    Leukocytes, UA 3+ (A) Negative   CBC auto differential    Collection Time: 01/06/20  6:59 PM   Result Value Ref Range    WBC 7.66 3.90 - 12.70 K/uL    RBC 3.33 (L) 4.60 - 6.20 M/uL    Hemoglobin 10.0 (L) 14.0 - 18.0 g/dL    Hematocrit 32.1 (L) 40.0 - 54.0 %    Mean Corpuscular Volume 96 82 - 98 fL    Mean Corpuscular Hemoglobin 30.0 27.0 - 31.0 pg    Mean Corpuscular Hemoglobin Conc 31.2 (L) 32.0 - 36.0 g/dL    RDW 13.4 11.5 - 14.5 %    Platelets 159 150 - 350 K/uL    MPV 11.1 9.2 - 12.9 fL    Immature Granulocytes 1.0 (H) 0.0 - 0.5 %    Gran # (ANC) 6.3 1.8 - 7.7 K/uL    Immature Grans (Abs) 0.08 (H) 0.00 - 0.04 K/uL    Lymph # 0.7 (L) 1.0 - 4.8 K/uL    Mono # 0.5 0.3 - 1.0 K/uL    Eos # 0.1 0.0 - 0.5 K/uL    Baso # 0.02 0.00 - 0.20 K/uL    nRBC 0 0 /100 WBC    Gran% 82.6 (H) 38.0 - 73.0 %    Lymph% 8.5 (L) 18.0 - 48.0 %    Mono% 6.4 4.0 - 15.0 %    Eosinophil% 1.2 0.0 - 8.0 %    Basophil% 0.3 0.0 - 1.9 %    Differential Method Automated    Urinalysis Microscopic    Collection Time: 01/06/20  6:59 PM   Result Value Ref Range    RBC, UA 29 (H) 0 - 4 /hpf    WBC, UA >100 (H) 0 - 5 /hpf    WBC Clumps, UA Occasional (A) None-Rare    Bacteria Many (A) None-Occ /hpf    Squam Epithel, UA 0 /hpf    Microscopic Comment SEE COMMENT    T4, free    Collection Time: 01/06/20  6:59 PM   Result Value Ref Range    Free T4 0.85 0.71 - 1.51 ng/dL   POCT glucose    Collection Time: 01/06/20  7:12 PM   Result Value Ref Range    POCT Glucose 80 70 - 110 mg/dL   Comprehensive Metabolic Panel (CMP)    Collection Time: 01/07/20  3:33 AM   Result Value Ref Range    Sodium 141 136 - 145 mmol/L    Potassium 3.5 3.5 - 5.1 mmol/L    Chloride 111 (H) 95 - 110 mmol/L    CO2 22 (L) 23 - 29 mmol/L    Glucose 81 70 - 110 mg/dL    BUN, Bld 15 8 - 23 mg/dL    Creatinine 1.3 0.5 - 1.4 mg/dL    Calcium 8.6 (L) 8.7 - 10.5 mg/dL    Total Protein 6.2 6.0 - 8.4 g/dL    Albumin 2.8 (L) 3.5 - 5.2  g/dL    Total Bilirubin 0.5 0.1 - 1.0 mg/dL    Alkaline Phosphatase 56 55 - 135 U/L    AST 18 10 - 40 U/L    ALT 14 10 - 44 U/L    Anion Gap 8 8 - 16 mmol/L    eGFR if African American >60.0 >60 mL/min/1.73 m^2    eGFR if non  56.1 (A) >60 mL/min/1.73 m^2   Magnesium    Collection Time: 01/07/20  3:33 AM   Result Value Ref Range    Magnesium 1.5 (L) 1.6 - 2.6 mg/dL   Phosphorus    Collection Time: 01/07/20  3:33 AM   Result Value Ref Range    Phosphorus 2.4 (L) 2.7 - 4.5 mg/dL   CBC with Automated Differential    Collection Time: 01/07/20  3:33 AM   Result Value Ref Range    WBC 8.69 3.90 - 12.70 K/uL    RBC 3.59 (L) 4.60 - 6.20 M/uL    Hemoglobin 10.8 (L) 14.0 - 18.0 g/dL    Hematocrit 35.1 (L) 40.0 - 54.0 %    Mean Corpuscular Volume 98 82 - 98 fL    Mean Corpuscular Hemoglobin 30.1 27.0 - 31.0 pg    Mean Corpuscular Hemoglobin Conc 30.8 (L) 32.0 - 36.0 g/dL    RDW 13.4 11.5 - 14.5 %    Platelets 183 150 - 350 K/uL    MPV 11.5 9.2 - 12.9 fL    Immature Granulocytes 0.9 (H) 0.0 - 0.5 %    Gran # (ANC) 7.1 1.8 - 7.7 K/uL    Immature Grans (Abs) 0.08 (H) 0.00 - 0.04 K/uL    Lymph # 0.8 (L) 1.0 - 4.8 K/uL    Mono # 0.6 0.3 - 1.0 K/uL    Eos # 0.1 0.0 - 0.5 K/uL    Baso # 0.04 0.00 - 0.20 K/uL    nRBC 0 0 /100 WBC    Gran% 81.5 (H) 38.0 - 73.0 %    Lymph% 8.9 (L) 18.0 - 48.0 %    Mono% 7.0 4.0 - 15.0 %    Eosinophil% 1.2 0.0 - 8.0 %    Basophil% 0.5 0.0 - 1.9 %    Differential Method Automated    Tacrolimus level    Collection Time: 01/07/20  3:33 AM   Result Value Ref Range    Tacrolimus Lvl 3.2 (L) 5.0 - 15.0 ng/mL         Significant Imaging: I have reviewed all pertinent imaging results/findings within the past 24 hours.

## 2020-01-08 NOTE — ASSESSMENT & PLAN NOTE
[place] [facility] [place] two episodes of painless blood per rectum with some stools. He notes gross blood with some clots but denies any dark or tarry stools over the past week or on day of presentation. He denies any n/v, odynophagia, post prandial pain, dyspepsia, NSAID use, dysuria, hematuria, rectal pain or rash. He states that he has never experienced this before but does have a FH of colon cancer in his brother. Unkown last colonoscopy.     CT demonstrated limited findings given lack of contrast in pt s/p kidney transplant. It showed diverticulosis with possible diverstiiiculitis. Patients bleeding likely secondary to diverticulosis with possible diverticulitis. He had possitive occult blood but otherwise had had no bleeding since presentation. It is possible that there is ongoing bleeding. Hemoglobin was 11.4 and Lactate 1.3 on arrival.     GI was called and they noted that patient was not a candidate for scope at this time and that they could not further comment on presence of diverticulitis without better imaging. They stated they would see the patient outpatient.     Plan:  - IVF bolus on 1/6  - Clear liquid diet advanced to regular diet on 1/7  - Daily CBC, CMP, Mg, Phos  - Continue cipro and flagyll (started in ED)  - o/p GI f/u

## 2020-01-08 NOTE — ASSESSMENT & PLAN NOTE
Patient presented with phos of 2.3. Increased to 2.4 on 1/7    Continue to give oral repletion. Will continue to monitor

## 2020-01-08 NOTE — ASSESSMENT & PLAN NOTE
Reported history of hypothyroidism on home levothyroxine.  TSH 15 and T4 wnl on admission.    Restarted home medication on admission

## 2020-01-08 NOTE — ASSESSMENT & PLAN NOTE
Patient with recurrent UTIs (last was strep agalactiae) is currently asymptomatic without any dysuria or hematuria or flank pain. UA was concerning for infection with UCx demonstrating >100,000 CFU of gram negative bacteria.     KTM is following and noted that they would recommend patient staying inpatient pending speciation and sensitivities of urine culture as he is currently immunosuppressed given his previous kidney transplant.     Plan:  - Follow up UCx results  - Currently on oral cipro and flagyll for diverticulitis  - May adjust abx regimen pending results  - Can discharge once results have come back and abx appropriately adjusted

## 2020-01-08 NOTE — PROGRESS NOTES
[facility] Hospital Medicine  Progress Note    Patient Name: Alin Burkett  MRN: [id number]  Patient Class: OP- Observation   Admission Date: 1/6/2020  Length of Stay: 1 days  Attending Physician: Dameon Anderson MD  Primary Care Provider: Carmen Krueegr MD    [facility] Medicine Team: [facility] HOSP MED 1 Brenton Rodriguez MD    Subjective:     Principal Problem:Rectal bleeding        HPI:  Patient with history of CKD (s/p kidney transplant in 2016), chronic immunosuppression (tacro, prednisone, and mycophenolate), htn, and hypothyroidism presented to [facility] ED on 1/6/2020  following two episodes of painless blood per rectum on the same day. Patient notes that first episode was early in AM with the second event in afternoon prior to presentation. He endorsed some clotting and gross blood as well with both episodes. He reports no history of similar events but does endorse an associate LLQ pain with palpation but not at rest. He denies any fever, chills, n/v, odynophagia, post prandial pain, chest pain, sob, cough, dyspepsia, dysuria, hematuria, rash, edema, or pallor. He denies having any dark or tarry stools over the past week or any episodes of hematemesis.     Patient is a former smoker of 40 years (0.5 PPD) but quit over 10 years ago. He notes social alcohol use and no history of drug use. He lives at home and is functionally independent. Of note, he does endorse a family history of colon cancer in his brother.     Overview/Hospital Course:  Patient with kidney transplant and on chronic immunosuppression was admitted for 2 episodes of painless blood per rectum with associated LLQ pain on palpation at home. On arrival he was noted to have positive fecal occult and with unknown last colonoscopy. He noted no fever chills, nausea, vomiting, confusion, LH, hemoptysis, hematemesis, chest pain, sob, hematuria, dysuria. Over hospital course, he had one additional episode but with stable hb and no leukocytosis. CT  without contrast demonstrated diverticulosis with possible diverticulitis. GI consulted and said no intervention at this time was indicated but will follow up outpatient. Patient was started on cipro and flagyll. KTM consulted and recommended continuing patients home immunosuppression regimen. They also noted that patient should have a CT with contrast and 48 hours of IVF thereafter to avoid DIVINE given his kidney transplant history so that the patient can CT with contrast was suspicious for diverticulitis. Of note, he was found to have >100,000 CFU of gram negative bacteria on UCx following a dirty UA. KTM recommended waiting for speciation and sensitivities prior to discharging patient given his immunosuppressed state. Patient will likely be discharged on 1/9 AM following speciation of UCx with antibiotics and GI follow up outpatient.     Interval History: Patient without any acute events overnight and without any new events of blood per rectum overnight. He notes improvement in abdominal pain and denies any new or worsening symptoms including fever, chills, n/v, BV, LH, dizzinessm cough, hematemesis, hemoptysis, chest pain, sob, rash or hematuria/dysuria. He states he feels well this AM.     Review of Systems   Constitutional: Negative for chills, diaphoresis, fatigue and fever.   HENT: Negative for sore throat and trouble swallowing.    Eyes: Negative for redness and visual disturbance.   Respiratory: Negative for cough, shortness of breath and wheezing.    Cardiovascular: Negative for chest pain, palpitations and leg swelling.   Gastrointestinal: Positive for abdominal pain (LLQ with palpation), anal bleeding (improving) and blood in stool (improving). Negative for abdominal distention, constipation, diarrhea, nausea, rectal pain and vomiting.   Genitourinary: Negative for dysuria, flank pain and hematuria.   Musculoskeletal: Negative for gait problem and myalgias.   Skin: Negative for color change, pallor and  rash.   Neurological: Negative for light-headedness and headaches.   Psychiatric/Behavioral: Negative for confusion and decreased concentration.     Objective:     Vital Signs (Most Recent):  Temp: 97.7 °F (36.5 °C) (01/08/20 0816)  Pulse: 67 (01/08/20 0816)  Resp: 18 (01/08/20 0816)  BP: 116/77 (01/08/20 0816)  SpO2: (!) 94 % (01/08/20 0816) Vital Signs (24h Range):  Temp:  [97.7 °F (36.5 °C)-98.8 °F (37.1 °C)] 97.7 °F (36.5 °C)  Pulse:  [67-99] 67  Resp:  [16-20] 18  SpO2:  [94 %-99 %] 94 %  BP: (112-124)/(63-77) 116/77     Weight: 68 kg (150 lb)  Body mass index is 20.92 kg/m².    Intake/Output Summary (Last 24 hours) at 1/8/2020 0939  Last data filed at 1/7/2020 1736  Gross per 24 hour   Intake 690 ml   Output 2 ml   Net 688 ml      Physical Exam   Constitutional: He is oriented to person, place, and time. He appears well-developed and well-nourished. No distress.   HENT:   Head: Normocephalic and atraumatic.   Eyes: Conjunctivae and EOM are normal. No scleral icterus.   Neck: Normal range of motion. Neck supple. No JVD present.   Cardiovascular: Normal rate, regular rhythm, normal heart sounds and intact distal pulses.   No murmur heard.  Pulmonary/Chest: Effort normal and breath sounds normal. No respiratory distress. He has no wheezes.   Abdominal: Soft. He exhibits no distension. There is tenderness (LLQ; improving). There is no rebound and no guarding.   Musculoskeletal: Normal range of motion. He exhibits no edema or tenderness.   Neurological: He is alert and oriented to person, place, and time.   Skin: Skin is warm and dry. He is not diaphoretic. No erythema. No pallor.   Psychiatric: He has a normal mood and affect. His behavior is normal. Judgment and thought content normal.       Significant Labs: All pertinent labs within the past 24 hours have been reviewed.    Recent Results (from the past 24 hour(s))   Comprehensive Metabolic Panel (CMP)    Collection Time: 01/08/20  3:58 AM   Result Value Ref  Range    Sodium 139 136 - 145 mmol/L    Potassium 3.2 (L) 3.5 - 5.1 mmol/L    Chloride 113 (H) 95 - 110 mmol/L    CO2 19 (L) 23 - 29 mmol/L    Glucose 91 70 - 110 mg/dL    BUN, Bld 13 8 - 23 mg/dL    Creatinine 1.3 0.5 - 1.4 mg/dL    Calcium 8.1 (L) 8.7 - 10.5 mg/dL    Total Protein 5.6 (L) 6.0 - 8.4 g/dL    Albumin 2.6 (L) 3.5 - 5.2 g/dL    Total Bilirubin 0.2 0.1 - 1.0 mg/dL    Alkaline Phosphatase 60 55 - 135 U/L    AST 17 10 - 40 U/L    ALT 14 10 - 44 U/L    Anion Gap 7 (L) 8 - 16 mmol/L    eGFR if African American >60.0 >60 mL/min/1.73 m^2    eGFR if non  56.1 (A) >60 mL/min/1.73 m^2   Magnesium    Collection Time: 01/08/20  3:58 AM   Result Value Ref Range    Magnesium 1.9 1.6 - 2.6 mg/dL   Phosphorus    Collection Time: 01/08/20  3:58 AM   Result Value Ref Range    Phosphorus 2.8 2.7 - 4.5 mg/dL   CBC with Automated Differential    Collection Time: 01/08/20  3:58 AM   Result Value Ref Range    WBC 6.79 3.90 - 12.70 K/uL    RBC 3.22 (L) 4.60 - 6.20 M/uL    Hemoglobin 9.7 (L) 14.0 - 18.0 g/dL    Hematocrit 31.3 (L) 40.0 - 54.0 %    Mean Corpuscular Volume 97 82 - 98 fL    Mean Corpuscular Hemoglobin 30.1 27.0 - 31.0 pg    Mean Corpuscular Hemoglobin Conc 31.0 (L) 32.0 - 36.0 g/dL    RDW 13.4 11.5 - 14.5 %    Platelets 181 150 - 350 K/uL    MPV 11.2 9.2 - 12.9 fL    Immature Granulocytes 1.2 (H) 0.0 - 0.5 %    Gran # (ANC) 5.4 1.8 - 7.7 K/uL    Immature Grans (Abs) 0.08 (H) 0.00 - 0.04 K/uL    Lymph # 0.6 (L) 1.0 - 4.8 K/uL    Mono # 0.5 0.3 - 1.0 K/uL    Eos # 0.1 0.0 - 0.5 K/uL    Baso # 0.02 0.00 - 0.20 K/uL    nRBC 0 0 /100 WBC    Gran% 80.1 (H) 38.0 - 73.0 %    Lymph% 9.4 (L) 18.0 - 48.0 %    Mono% 7.4 4.0 - 15.0 %    Eosinophil% 1.6 0.0 - 8.0 %    Basophil% 0.3 0.0 - 1.9 %    Differential Method Automated    Tacrolimus level    Collection Time: 01/08/20  3:58 AM   Result Value Ref Range    Tacrolimus Lvl 7.5 5.0 - 15.0 ng/mL         Significant Imaging: I have reviewed all pertinent imaging  results/findings within the past 24 hours.      Assessment/Plan:      * Rectal bleeding  Patient presented to [facility] on 1/6 following two episodes of painless blood per rectum with some stools. He notes gross blood with some clots but denies any dark or tarry stools over the past week or on day of presentation. He denies any n/v, odynophagia, post prandial pain, dyspepsia, NSAID use, dysuria, hematuria, rectal pain or rash. He states that he has never experienced this before but does have a FH of colon cancer in his brother. Unkown last colonoscopy.     CT demonstrated limited findings given lack of contrast in pt s/p kidney transplant. It showed diverticulosis with possible diverstiiiculitis. Patients bleeding likely secondary to diverticulosis with possible diverticulitis. He had possitive occult blood but otherwise had had no bleeding since presentation. It is possible that there is ongoing bleeding. Hemoglobin was 11.4 and Lactate 1.3 on arrival.     GI was called and they noted that patient was not a candidate for scope at this time and that they could not further comment on presence of diverticulitis without better imaging. They will follow up outpatient. KTM noted that patient should receive contrast for definitive ruling on diverticulitis with 48 hours IVF. CT with contrast was suspicious for diverticulitis.     Plan:  - IVF bolus on 1/6; currently on 100cc an hour for 48 hours  - Clear liquid diet advanced to regular diet on 1/7  - Daily CBC, CMP, Mg, Phos  - Continue cipro and flagyll for diverticulitis (will discharge with regimen in future)  - o/p GI f/u        Recurrent UTI  Patient with recurrent UTIs (last was strep agalactiae) is currently asymptomatic without any dysuria or hematuria or flank pain. UA was concerning for infection with UCx demonstrating >100,000 CFU of gram negative bacteria.     KTM is following and noted that they would recommend patient staying inpatient pending speciation and  sensitivities of urine culture as he is currently immunosuppressed given his previous kidney transplant.     Plan:  - Follow up UCx results  - Currently on oral cipro and flagyll for diverticulitis  - May adjust abx regimen pending results  - Can discharge once results have come back and abx appropriately adjusted    Hypophosphatemia  Patient presented with phos of 2.3. Increased to 2.4 on  and 2.8 on .    Resolved currently. 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 Cr wnl on admission and he denies any ongoing dysuria or hematuria or change in frequency. He also states he is compliant on home medications.    Home meds: Tacro, Prednisone 5, mycophenolate, fluconazole    KTM following patient. Stated that patient would need definitive ruling in or out of diverticulitis for proper ppx and immunosuppresion recommendations to be made. GI stated that patient would not be candidate for scope and that CT with contrast would be mode of diagnosing diverticulitis. (Patients previous study was limited as it was without contrast). KTM stated that patient should receive contrast for CT with 48 hours of IVF after for proper imaging to rule in/out diverticulitis.     Plan:  - KTM following; appreciate recs  - 100cc IVF/hr for 48 hours following contrast administration - per KTM recs  - Continue home prednisone and tacro  - Currently holding home mycophenolate  - Phos repleted      -donor kidney transplant  See s/p kidney transplant      Long-term use of immunosuppressant medication  See s/p kidney transplant      Hypothyroidism  Reported history of hypothyroidism on home levothyroxine.  TSH 15 and T4 wnl on admission.    Restarted home medication on admission      Congenital absence of kidney  See s/p kidney transplant      Essential hypertension  Patient currently  normotensive and with no notes home htn medications    Will continue to monitor        VTE Risk Mitigation (From admission, onward)         Ordered     IP VTE LOW RISK PATIENT  Once      01/06/20 1852     Place sequential compression device  Until discontinued      01/06/20 1852                      Brenton Rodriguez MD  Department of Hospital Medicine   [facility]

## 2020-01-08 NOTE — SUBJECTIVE & OBJECTIVE
Interval History: Patient without any acute events overnight and without any new events of blood per rectum overnight. He notes improvement in abdominal pain and denies any new or worsening symptoms including fever, chills, n/v, BV, LH, dizzinessm cough, hematemesis, hemoptysis, chest pain, sob, rash or hematuria/dysuria. He states he feels well this AM.     Review of Systems   Constitutional: Negative for chills, diaphoresis, fatigue and fever.   HENT: Negative for sore throat and trouble swallowing.    Eyes: Negative for redness and visual disturbance.   Respiratory: Negative for cough, shortness of breath and wheezing.    Cardiovascular: Negative for chest pain, palpitations and leg swelling.   Gastrointestinal: Positive for abdominal pain (LLQ with palpation), anal bleeding (improving) and blood in stool (improving). Negative for abdominal distention, constipation, diarrhea, nausea, rectal pain and vomiting.   Genitourinary: Negative for dysuria, flank pain and hematuria.   Musculoskeletal: Negative for gait problem and myalgias.   Skin: Negative for color change, pallor and rash.   Neurological: Negative for light-headedness and headaches.   Psychiatric/Behavioral: Negative for confusion and decreased concentration.     Objective:     Vital Signs (Most Recent):  Temp: 97.7 °F (36.5 °C) (01/08/20 0816)  Pulse: 67 (01/08/20 0816)  Resp: 18 (01/08/20 0816)  BP: 116/77 (01/08/20 0816)  SpO2: (!) 94 % (01/08/20 0816) Vital Signs (24h Range):  Temp:  [97.7 °F (36.5 °C)-98.8 °F (37.1 °C)] 97.7 °F (36.5 °C)  Pulse:  [67-99] 67  Resp:  [16-20] 18  SpO2:  [94 %-99 %] 94 %  BP: (112-124)/(63-77) 116/77     Weight: 68 kg (150 lb)  Body mass index is 20.92 kg/m².    Intake/Output Summary (Last 24 hours) at 1/8/2020 0939  Last data filed at 1/7/2020 1736  Gross per 24 hour   Intake 690 ml   Output 2 ml   Net 688 ml      Physical Exam   Constitutional: He is oriented to person, place, and time. He appears well-developed and  well-nourished. No distress.   HENT:   Head: Normocephalic and atraumatic.   Eyes: Conjunctivae and EOM are normal. No scleral icterus.   Neck: Normal range of motion. Neck supple. No JVD present.   Cardiovascular: Normal rate, regular rhythm, normal heart sounds and intact distal pulses.   No murmur heard.  Pulmonary/Chest: Effort normal and breath sounds normal. No respiratory distress. He has no wheezes.   Abdominal: Soft. He exhibits no distension. There is tenderness (LLQ; improving). There is no rebound and no guarding.   Musculoskeletal: Normal range of motion. He exhibits no edema or tenderness.   Neurological: He is alert and oriented to person, place, and time.   Skin: Skin is warm and dry. He is not diaphoretic. No erythema. No pallor.   Psychiatric: He has a normal mood and affect. His behavior is normal. Judgment and thought content normal.       Significant Labs: All pertinent labs within the past 24 hours have been reviewed.    Recent Results (from the past 24 hour(s))   Comprehensive Metabolic Panel (CMP)    Collection Time: 01/08/20  3:58 AM   Result Value Ref Range    Sodium 139 136 - 145 mmol/L    Potassium 3.2 (L) 3.5 - 5.1 mmol/L    Chloride 113 (H) 95 - 110 mmol/L    CO2 19 (L) 23 - 29 mmol/L    Glucose 91 70 - 110 mg/dL    BUN, Bld 13 8 - 23 mg/dL    Creatinine 1.3 0.5 - 1.4 mg/dL    Calcium 8.1 (L) 8.7 - 10.5 mg/dL    Total Protein 5.6 (L) 6.0 - 8.4 g/dL    Albumin 2.6 (L) 3.5 - 5.2 g/dL    Total Bilirubin 0.2 0.1 - 1.0 mg/dL    Alkaline Phosphatase 60 55 - 135 U/L    AST 17 10 - 40 U/L    ALT 14 10 - 44 U/L    Anion Gap 7 (L) 8 - 16 mmol/L    eGFR if African American >60.0 >60 mL/min/1.73 m^2    eGFR if non  56.1 (A) >60 mL/min/1.73 m^2   Magnesium    Collection Time: 01/08/20  3:58 AM   Result Value Ref Range    Magnesium 1.9 1.6 - 2.6 mg/dL   Phosphorus    Collection Time: 01/08/20  3:58 AM   Result Value Ref Range    Phosphorus 2.8 2.7 - 4.5 mg/dL   CBC with Automated  Differential    Collection Time: 01/08/20  3:58 AM   Result Value Ref Range    WBC 6.79 3.90 - 12.70 K/uL    RBC 3.22 (L) 4.60 - 6.20 M/uL    Hemoglobin 9.7 (L) 14.0 - 18.0 g/dL    Hematocrit 31.3 (L) 40.0 - 54.0 %    Mean Corpuscular Volume 97 82 - 98 fL    Mean Corpuscular Hemoglobin 30.1 27.0 - 31.0 pg    Mean Corpuscular Hemoglobin Conc 31.0 (L) 32.0 - 36.0 g/dL    RDW 13.4 11.5 - 14.5 %    Platelets 181 150 - 350 K/uL    MPV 11.2 9.2 - 12.9 fL    Immature Granulocytes 1.2 (H) 0.0 - 0.5 %    Gran # (ANC) 5.4 1.8 - 7.7 K/uL    Immature Grans (Abs) 0.08 (H) 0.00 - 0.04 K/uL    Lymph # 0.6 (L) 1.0 - 4.8 K/uL    Mono # 0.5 0.3 - 1.0 K/uL    Eos # 0.1 0.0 - 0.5 K/uL    Baso # 0.02 0.00 - 0.20 K/uL    nRBC 0 0 /100 WBC    Gran% 80.1 (H) 38.0 - 73.0 %    Lymph% 9.4 (L) 18.0 - 48.0 %    Mono% 7.4 4.0 - 15.0 %    Eosinophil% 1.6 0.0 - 8.0 %    Basophil% 0.3 0.0 - 1.9 %    Differential Method Automated    Tacrolimus level    Collection Time: 01/08/20  3:58 AM   Result Value Ref Range    Tacrolimus Lvl 7.5 5.0 - 15.0 ng/mL         Significant Imaging: I have reviewed all pertinent imaging results/findings within the past 24 hours.

## 2020-01-08 NOTE — ASSESSMENT & PLAN NOTE
-Cx with gram neg rods.  -Would not discharge until sensitivites come back, would cont current abx

## 2020-01-08 NOTE — ASSESSMENT & PLAN NOTE
Patient with previous CKD and congenital anomaly s/p Kidney transplant in 2016 and on chronic immunosuppresion presented to ED on 1/6. Cr wnl on admission and he denies any ongoing dysuria or hematuria or change in frequency. He also states he is compliant on home medications.    Home meds: Tacro, Prednisone 5, mycophenolate, fluconazole    KTM following patient. Stated that patient would need definitive ruling in or out of diverticulitis for proper ppx and immunosuppresion recommendations to be made. GI stated that patient would not be candidate for scope and that CT with contrast would be mode of diagnosing diverticulitis. (Patients previous study was limited as it was without contrast). KTM stated that patient should receive contrast for CT with 48 hours of IVF after for proper imaging to rule in/out diverticulitis.     Plan:  - KTM following; appreciate recs  - 100cc IVF/hr for 48 hours following contrast administration - per KTM recs  - Continue home prednisone and tacro  - Currently holding home mycophenolate  - Phos repleted

## 2020-01-08 NOTE — ASSESSMENT & PLAN NOTE
-Takes prednisone, MMF and prograft at home  -Cr at baseline (~1.5)  -Hold MMF given concern for active infx until abx finished, cont prograft and tacro, daily prograft levels, monitor for s/s of toxicity

## 2020-01-08 NOTE — PHARMACY MED REC
"Admission Medication Reconciliation - Pharmacy Consult Note    The home medication history was taken by Gely Serrano, Pharmacy Technician. Based on information gathered and subsequent review by the clinical pharmacist, the items below may need attention.     You may go to "Admission" then "Reconcile Home Medications" tabs to review and/or act upon these items.     Potentially problematic discrepancies with current MAR  o Patient IS taking the following which was not ordered upon admit  o Calcitriol 0.5 mcg PO daily  o Sodium bicarbonate 650 mg PO BID    Please address this information as you see fit.  Feel free to contact us if you have any questions or require assistance.    Hattie Broussard, PharmD, BCPS  [id number]     PTA Medications   Medication Sig    aspirin (ECOTRIN) 81 MG EC tablet Take 1 tablet (81 mg total) by mouth once daily.    calcitRIOL (ROCALTROL) 0.5 MCG Cap Take 1 capsule (0.5 mcg total) by mouth once daily.    famotidine (PEPCID) 20 MG tablet Take 1 tablet (20 mg total) by mouth every evening.    ketoconazole (NIZORAL) 200 mg Tab Take 0.5 tablets (100 mg total) by mouth once daily.    levothyroxine (SYNTHROID) 100 MCG tablet Take 1 tablet (100 mcg total) by mouth once daily.    magnesium oxide (MAG-OX) 400 mg (241.3 mg magnesium) tablet Take 1 tablet (400 mg total) by mouth once daily.    metoprolol tartrate (LOPRESSOR) 25 MG tablet Take 0.5 tablets (12.5 mg total) by mouth 2 (two) times daily.    multivitamin (ONE DAILY MULTIVITAMIN) per tablet Take 1 tablet by mouth once daily.    mycophenolate (CELLCEPT) 250 mg Cap Take 3 capsules (750 mg total) by mouth 2 (two) times daily. Z94.0/Kidney transplant on 11/26/16    predniSONE (DELTASONE) 5 MG tablet Take 1 tablet (5 mg total) by mouth once daily. Z94.0/Kidney transplant on 11/26/2016    sodium bicarbonate 650 MG tablet Take 1 tablet (650 mg total) by mouth 2 (two) times daily.    tacrolimus (PROGRAF) 1 MG Cap Take 3 capsules (3 mg total) " by mouth every morning AND 2 capsules (2 mg total) every evening. Z94.0/Kidney Transplant on 11/26/16.    pep injection Inject 0.25 ml as directed.   May increase by 0.05 ml such as 0.30, 0.35 ml etc. Up to 1.00 ml Until adequate result is achieved.    For compounding pharmacy use:   Add PAPAVERINE 30 mcg  Add PHENTOLAMINE 10 mg  Add ALPROSTADIL 100 mcg     .    .

## 2020-01-08 NOTE — PROGRESS NOTES
[facility] Hospital Medicine  Progress Note    Patient Name: Alin Burkett  MRN: [id number]  Patient Class: OP- Observation   Admission Date: 1/6/2020  Length of Stay: 1 days  Attending Physician: Dameon Anderson MD  Primary Care Provider: Carmen Krueger MD    [facility] Medicine Team: [facility]OSP MED 1 Brenton Rodriguez MD    Subjective:     Principal Problem:Rectal bleeding        HPI:  Patient with history of CKD (s/p kidney transplant in 2016), chronic immunosuppression (tacro, prednisone, and mycophenolate), htn, and hypothyroidism presented to [facility] ED on 1/6/2020  following two episodes of painless blood per rectum on the same day. Patient notes that first episode was early in AM with the second event in afternoon prior to presentation. He endorsed some clotting and gross blood as well with both episodes. He reports no history of similar events but does endorse an associate LLQ pain with palpation but not at rest. He denies any fever, chills, n/v, odynophagia, post prandial pain, chest pain, sob, cough, dyspepsia, dysuria, hematuria, rash, edema, or pallor. He denies having any dark or tarry stools over the past week or any episodes of hematemesis.     Patient is a former smoker of 40 years (0.5 PPD) but quit over 10 years ago. He notes social alcohol use and no history of drug use. He lives at home and is functionally independent. Of note, he does endorse a family history of colon cancer in his brother.     Overview/Hospital Course:  No notes on file    Interval History: Patient with one episode of blood per rectum overnight. He states that it was painless and without clots. He still is having LLQ pain with palpation butotherwise denies any other abdominal pain, fever, chills, n/v, chest pain, dysuria, hematuria, rash, edema or hematemesis.     Review of Systems   Constitutional: Negative for chills, diaphoresis, fatigue and fever.   HENT: Negative for sore throat and trouble swallowing.    Eyes:  Negative for redness and visual disturbance.   Respiratory: Negative for cough, shortness of breath and wheezing.    Cardiovascular: Negative for chest pain, palpitations and leg swelling.   Gastrointestinal: Positive for abdominal pain (LLQ with palpation), anal bleeding and blood in stool. Negative for abdominal distention, constipation, diarrhea, nausea, rectal pain and vomiting.   Genitourinary: Negative for dysuria, flank pain and hematuria.   Musculoskeletal: Negative for gait problem and myalgias.   Skin: Negative for color change, pallor and rash.   Neurological: Negative for light-headedness and headaches.   Psychiatric/Behavioral: Negative for confusion and decreased concentration.     Objective:     Vital Signs (Most Recent):  Temp: 98.2 °F (36.8 °C) (01/07/20 1556)  Pulse: 96 (01/07/20 1556)  Resp: 18 (01/07/20 1556)  BP: 124/68 (01/07/20 1556)  SpO2: 96 % (01/07/20 1556) Vital Signs (24h Range):  Temp:  [97 °F (36.1 °C)-99.4 °F (37.4 °C)] 98.2 °F (36.8 °C)  Pulse:  [75-98] 96  Resp:  [18-20] 18  SpO2:  [95 %-99 %] 96 %  BP: (104-139)/(63-84) 124/68     Weight: 68 kg (150 lb)  Body mass index is 20.92 kg/m².    Intake/Output Summary (Last 24 hours) at 1/7/2020 1801  Last data filed at 1/7/2020 1300  Gross per 24 hour   Intake 1070 ml   Output 1 ml   Net 1069 ml      Physical Exam   Constitutional: He is oriented to person, place, and time. He appears well-developed and well-nourished. No distress.   HENT:   Head: Normocephalic and atraumatic.   Eyes: Conjunctivae and EOM are normal. No scleral icterus.   Neck: Normal range of motion. Neck supple. No JVD present.   Cardiovascular: Normal rate, regular rhythm, normal heart sounds and intact distal pulses.   No murmur heard.  Pulmonary/Chest: Effort normal and breath sounds normal. No respiratory distress. He has no wheezes.   Abdominal: Soft. He exhibits no distension. There is tenderness (LLQ). There is no rebound and no guarding.   Musculoskeletal:  Normal range of motion. He exhibits no edema or tenderness.   Neurological: He is alert and oriented to person, place, and time.   Skin: Skin is warm and dry. He is not diaphoretic. No erythema. No pallor.   Psychiatric: He has a normal mood and affect. His behavior is normal. Judgment and thought content normal.       Significant Labs: All pertinent labs within the past 24 hours have been reviewed.    Recent Results (from the past 24 hour(s))   TSH    Collection Time: 01/06/20  6:59 PM   Result Value Ref Range    TSH 15.087 (H) 0.400 - 4.000 uIU/mL   Hemoglobin A1c    Collection Time: 01/06/20  6:59 PM   Result Value Ref Range    Hemoglobin A1C 5.3 4.0 - 5.6 %    Estimated Avg Glucose 105 68 - 131 mg/dL   Iron and TIBC    Collection Time: 01/06/20  6:59 PM   Result Value Ref Range    Iron 32 (L) 45 - 160 ug/dL    Transferrin 154 (L) 200 - 375 mg/dL    TIBC 228 (L) 250 - 450 ug/dL    Saturated Iron 14 (L) 20 - 50 %   Ferritin    Collection Time: 01/06/20  6:59 PM   Result Value Ref Range    Ferritin 1,646 (H) 20.0 - 300.0 ng/mL   Urinalysis, Reflex to Urine Culture Urine, Clean Catch    Collection Time: 01/06/20  6:59 PM   Result Value Ref Range    Specimen UA Urine, Clean Catch     Color, UA Yellow Yellow, Straw, Tati    Appearance, UA Hazy (A) Clear    pH, UA 6.0 5.0 - 8.0    Specific Gravity, UA 1.010 1.005 - 1.030    Protein, UA Negative Negative    Glucose, UA Negative Negative    Ketones, UA Negative Negative    Bilirubin (UA) Negative Negative    Occult Blood UA 1+ (A) Negative    Nitrite, UA Positive (A) Negative    Leukocytes, UA 3+ (A) Negative   CBC auto differential    Collection Time: 01/06/20  6:59 PM   Result Value Ref Range    WBC 7.66 3.90 - 12.70 K/uL    RBC 3.33 (L) 4.60 - 6.20 M/uL    Hemoglobin 10.0 (L) 14.0 - 18.0 g/dL    Hematocrit 32.1 (L) 40.0 - 54.0 %    Mean Corpuscular Volume 96 82 - 98 fL    Mean Corpuscular Hemoglobin 30.0 27.0 - 31.0 pg    Mean Corpuscular Hemoglobin Conc 31.2 (L)  32.0 - 36.0 g/dL    RDW 13.4 11.5 - 14.5 %    Platelets 159 150 - 350 K/uL    MPV 11.1 9.2 - 12.9 fL    Immature Granulocytes 1.0 (H) 0.0 - 0.5 %    Gran # (ANC) 6.3 1.8 - 7.7 K/uL    Immature Grans (Abs) 0.08 (H) 0.00 - 0.04 K/uL    Lymph # 0.7 (L) 1.0 - 4.8 K/uL    Mono # 0.5 0.3 - 1.0 K/uL    Eos # 0.1 0.0 - 0.5 K/uL    Baso # 0.02 0.00 - 0.20 K/uL    nRBC 0 0 /100 WBC    Gran% 82.6 (H) 38.0 - 73.0 %    Lymph% 8.5 (L) 18.0 - 48.0 %    Mono% 6.4 4.0 - 15.0 %    Eosinophil% 1.2 0.0 - 8.0 %    Basophil% 0.3 0.0 - 1.9 %    Differential Method Automated    Urinalysis Microscopic    Collection Time: 01/06/20  6:59 PM   Result Value Ref Range    RBC, UA 29 (H) 0 - 4 /hpf    WBC, UA >100 (H) 0 - 5 /hpf    WBC Clumps, UA Occasional (A) None-Rare    Bacteria Many (A) None-Occ /hpf    Squam Epithel, UA 0 /hpf    Microscopic Comment SEE COMMENT    T4, free    Collection Time: 01/06/20  6:59 PM   Result Value Ref Range    Free T4 0.85 0.71 - 1.51 ng/dL   POCT glucose    Collection Time: 01/06/20  7:12 PM   Result Value Ref Range    POCT Glucose 80 70 - 110 mg/dL   Comprehensive Metabolic Panel (CMP)    Collection Time: 01/07/20  3:33 AM   Result Value Ref Range    Sodium 141 136 - 145 mmol/L    Potassium 3.5 3.5 - 5.1 mmol/L    Chloride 111 (H) 95 - 110 mmol/L    CO2 22 (L) 23 - 29 mmol/L    Glucose 81 70 - 110 mg/dL    BUN, Bld 15 8 - 23 mg/dL    Creatinine 1.3 0.5 - 1.4 mg/dL    Calcium 8.6 (L) 8.7 - 10.5 mg/dL    Total Protein 6.2 6.0 - 8.4 g/dL    Albumin 2.8 (L) 3.5 - 5.2 g/dL    Total Bilirubin 0.5 0.1 - 1.0 mg/dL    Alkaline Phosphatase 56 55 - 135 U/L    AST 18 10 - 40 U/L    ALT 14 10 - 44 U/L    Anion Gap 8 8 - 16 mmol/L    eGFR if African American >60.0 >60 mL/min/1.73 m^2    eGFR if non  56.1 (A) >60 mL/min/1.73 m^2   Magnesium    Collection Time: 01/07/20  3:33 AM   Result Value Ref Range    Magnesium 1.5 (L) 1.6 - 2.6 mg/dL   Phosphorus    Collection Time: 01/07/20  3:33 AM   Result Value Ref  Range    Phosphorus 2.4 (L) 2.7 - 4.5 mg/dL   CBC with Automated Differential    Collection Time: 01/07/20  3:33 AM   Result Value Ref Range    WBC 8.69 3.90 - 12.70 K/uL    RBC 3.59 (L) 4.60 - 6.20 M/uL    Hemoglobin 10.8 (L) 14.0 - 18.0 g/dL    Hematocrit 35.1 (L) 40.0 - 54.0 %    Mean Corpuscular Volume 98 82 - 98 fL    Mean Corpuscular Hemoglobin 30.1 27.0 - 31.0 pg    Mean Corpuscular Hemoglobin Conc 30.8 (L) 32.0 - 36.0 g/dL    RDW 13.4 11.5 - 14.5 %    Platelets 183 150 - 350 K/uL    MPV 11.5 9.2 - 12.9 fL    Immature Granulocytes 0.9 (H) 0.0 - 0.5 %    Gran # (ANC) 7.1 1.8 - 7.7 K/uL    Immature Grans (Abs) 0.08 (H) 0.00 - 0.04 K/uL    Lymph # 0.8 (L) 1.0 - 4.8 K/uL    Mono # 0.6 0.3 - 1.0 K/uL    Eos # 0.1 0.0 - 0.5 K/uL    Baso # 0.04 0.00 - 0.20 K/uL    nRBC 0 0 /100 WBC    Gran% 81.5 (H) 38.0 - 73.0 %    Lymph% 8.9 (L) 18.0 - 48.0 %    Mono% 7.0 4.0 - 15.0 %    Eosinophil% 1.2 0.0 - 8.0 %    Basophil% 0.5 0.0 - 1.9 %    Differential Method Automated    Tacrolimus level    Collection Time: 01/07/20  3:33 AM   Result Value Ref Range    Tacrolimus Lvl 3.2 (L) 5.0 - 15.0 ng/mL         Significant Imaging: I have reviewed all pertinent imaging results/findings within the past 24 hours.      Assessment/Plan:      * Rectal bleeding  Patient presented to [facility] on 1/6 following two episodes of painless blood per rectum with some stools. He notes gross blood with some clots but denies any dark or tarry stools over the past week or on day of presentation. He denies any n/v, odynophagia, post prandial pain, dyspepsia, NSAID use, dysuria, hematuria, rectal pain or rash. He states that he has never experienced this before but does have a FH of colon cancer in his brother. Unkown last colonoscopy.     CT demonstrated limited findings given lack of contrast in pt s/p kidney transplant. It showed diverticulosis with possible diverstiiiculitis. Patients bleeding likely secondary to diverticulosis with possible  diverticulitis. He had possitive occult blood but otherwise had had no bleeding since presentation. It is possible that there is ongoing bleeding. Hemoglobin was 11.4 and Lactate 1.3 on arrival.     GI was called and they noted that patient was not a candidate for scope at this time and that they could not further comment on presence of diverticulitis without better imaging. They stated they would see the patient outpatient.     Plan:  - IVF bolus on   - Clear liquid diet advanced to regular diet on   - Daily CBC, CMP, Mg, Phos  - Continue cipro and flagyll (started in ED)  - o/p GI f/u        Hypophosphatemia  Patient presented with phos of 2.3. Increased to 2.4 on     Continue to give oral repletion. 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 Cr wnl on admission and he denies any ongoing dysuria or hematuria or change in frequency. He also states he is compliant on home medications.    Home meds: Tacro, Prednisone 5, mycophenolate, fluconazole    KTM following patient. Stated that patient would need definitive ruling in or out of diverticulitis for proper ppx and immunosuppresion recommendations to be made. GI stated that patient would not be candidate for scope and that CT with contrast would be mode of diagnosing diverticulitis. (Patients previous study was limited as it was without contrast). KTM stated that patient should receive contrast for CT with 48 hours of IVF after for proper imaging to rule in/out diverticulitis.     Plan:  - KTM following; appreciate recs  - CT with contrast abdomen pelvis pending;will follow up  - Continue home prednisone and tacro  - Currently holding home mycophenolate  - Continuous fluids 100cc/hr x48 hours  - Phos repleted will follow up      -donor kidney transplant  See s/p kidney transplant      Long-term use of  immunosuppressant medication  See s/p kidney transplant      Hypothyroidism  Reported history of hypothyroidism on home levothyroxine.  TSH 15 and T4 wnl on admission.    Restarted home medication on admission      Congenital absence of kidney  See s/p kidney transplant      Essential hypertension  Patient currently normotensive and with no notes home htn medications    Will continue to monitor        VTE Risk Mitigation (From admission, onward)         Ordered     IP VTE LOW RISK PATIENT  Once      01/06/20 1852     Place sequential compression device  Until discontinued      01/06/20 1852                      Brenton Rodriguez MD  Department of Hospital Medicine   [facility]

## 2020-01-08 NOTE — SUBJECTIVE & OBJECTIVE
Subjective:   History of Present Illness:  69 y.o. year old Black or  male who received a DD kidney transplant (11/26/16) presumed 2/2 to HTN and congenital absent left kidney maintained of prograft, MMF and prednisone, CKD 3A, hypothyroid, HTN who we were consulted on for IS recommendations and management. Pt reports 2 days PTA he developed 2 concurrent episodes of maroon colored stool. He described them as diarrhea and about 45 min apart. A/w LLQ pain.. He reports never having sx like this before. Pt came to ED where CT showed quationable diverticulitis +/- perf. He was started on abx and given IVF. GI was consulted. Today he reports one small dark stool however denies fevers, chills, nausea, vomiting.      Hospital Course:  No notes on file    Interval History:  abd pain improved. Reports one small episode of dark stool. Otherwise says he is doing better    Past Medical, Surgical, Family, and Social History:   Unchanged from H&P.    Scheduled Meds:   ciprofloxacin HCl  500 mg Oral Q12H    famotidine  20 mg Oral QHS    ketoconazole  100 mg Oral Daily    levothyroxine  100 mcg Oral Before breakfast    metroNIDAZOLE  500 mg Oral Q8H    potassium chloride  40 mEq Oral Q2H    predniSONE  5 mg Oral Daily    tacrolimus  2 mg Oral Daily PM    tacrolimus  3 mg Oral Daily AM     Continuous Infusions:   sodium chloride 0.9% 75 mL/hr at 01/07/20 1504     PRN Meds:acetaminophen, Dextrose 10% Bolus, Dextrose 10% Bolus, glucagon (human recombinant), glucose, glucose, melatonin, ondansetron, sodium chloride 0.9%    Intake/Output - Last 3 Shifts       01/06 0700 - 01/07 0659 01/07 0700 - 01/08 0659 01/08 0700 - 01/09 0659    P.O.  720     I.V. (mL/kg)  450 (6.6)     IV Piggyback 1300      Total Intake(mL/kg) 1300 (19.1) 1170 (17.2)     Urine (mL/kg/hr)  2 (0)     Stool  0     Total Output  2     Net +1300 +1168            Urine Occurrence  2 x     Stool Occurrence  2 x            Review of Systems  "  Constitutional: Negative for activity change and appetite change.   Respiratory: Negative for chest tightness and shortness of breath.    Cardiovascular: Negative for chest pain and palpitations.   Gastrointestinal: Negative for abdominal distention, abdominal pain and diarrhea.   Genitourinary: Negative for difficulty urinating and dysuria.   Skin: Negative for color change and rash.   Allergic/Immunologic: Positive for immunocompromised state.   Neurological: Negative for dizziness and syncope.   Psychiatric/Behavioral: Negative for agitation and behavioral problems.      Objective:     Vital Signs (Most Recent):  Temp: 97.7 °F (36.5 °C) (01/08/20 0816)  Pulse: 67 (01/08/20 0816)  Resp: 18 (01/08/20 0816)  BP: 116/77 (01/08/20 0816)  SpO2: (!) 94 % (01/08/20 0816) Vital Signs (24h Range):  Temp:  [97.7 °F (36.5 °C)-98.8 °F (37.1 °C)] 97.7 °F (36.5 °C)  Pulse:  [67-99] 67  Resp:  [16-20] 18  SpO2:  [94 %-99 %] 94 %  BP: (112-124)/(63-77) 116/77     Weight: 68 kg (150 lb)  Height: 5' 11" (180.3 cm)  Body mass index is 20.92 kg/m².    Physical Exam   Constitutional: He is oriented to person, place, and time. He appears well-developed and well-nourished.   HENT:   Head: Normocephalic and atraumatic.   Eyes: Pupils are equal, round, and reactive to light. EOM are normal.   Neck: Normal range of motion. Neck supple.   Cardiovascular: Normal rate, regular rhythm, normal heart sounds and intact distal pulses.   Pulmonary/Chest: Effort normal and breath sounds normal.   Abdominal: Soft. Bowel sounds are normal. He exhibits no distension. There is no tenderness.   Musculoskeletal: Normal range of motion.   Neurological: He is alert and oriented to person, place, and time.   Skin: Skin is warm and dry.   Psychiatric: He has a normal mood and affect. His behavior is normal.       Laboratory:  CBC:   Recent Labs   Lab 01/06/20  1859 01/07/20  0333 01/08/20  0358   WBC 7.66 8.69 6.79   RBC 3.33* 3.59* 3.22*   HGB 10.0* 10.8* " 9.7*   HCT 32.1* 35.1* 31.3*    183 181   MCV 96 98 97   MCH 30.0 30.1 30.1   MCHC 31.2* 30.8* 31.0*     CMP:   Recent Labs   Lab 01/06/20  1304 01/07/20  0333 01/08/20  0358   GLU 86 81 91   CALCIUM 9.6 8.6* 8.1*   ALBUMIN 3.2* 2.8* 2.6*   PROT 6.8 6.2 5.6*    141 139   K 4.0 3.5 3.2*   CO2 24 22* 19*    111* 113*   BUN 19 15 13   CREATININE 1.5* 1.3 1.3   ALKPHOS 62 56 60   ALT 18 14 14   AST 23 18 17       Diagnostic Results:  CT - Abd/Pelvic: No results found. However, due to the size of the patient record, not all encounters were searched. Please check Results Review for a complete set of results.

## 2020-01-08 NOTE — PLAN OF CARE
PCP- DR. ADELFO STEIN     PT HAS A RIDE HOME AND FAMILY SUPPORT WITH [employer]IR[employer]END.     PHARMACY- [facility] [place] AND [facility]    Coverage Name [employer] Phone     Employer Group   Group Number [id number]   Subscriber Name TANG LAUREN Subscriber Number [id number]   Subscriber Date of Birth 1950 Subscriber -   Subscriber Address [address] Subscriber Phone [phone]     [place] [address]            01/07/20 1133   Discharge Assessment   Assessment Type Discharge Planning Assessment   Confirmed/corrected address and phone number on facesheet? Yes   Assessment information obtained from? Patient   Expected Length of Stay (days) 3   Communicated expected length of stay with patient/caregiver yes   Prior to hospitilization cognitive status: Alert/Oriented   Prior to hospitalization functional status: Independent   Current cognitive status: Alert/Oriented   Current Functional Status: Independent   Lives With significant other   Able to Return to Prior Arrangements yes   Is patient able to care for self after discharge? Yes   Patient's perception of discharge disposition home or selfcare   Readmission Within the Last 30 Days no previous admission in last 30 days   Patient currently being followed by outpatient case management? No   Patient currently receives any other outside agency services? No   Equipment Currently Used at Home none   Do you have any problems affording any of your prescribed medications? No   Is the patient taking medications as prescribed? yes   Does the patient have transportation home? Yes   Transportation Anticipated family or friend will provide;car, drives self   Does the patient receive services at the [facility]mad[facility] Clinic? No   Discharge Plan A Home with family   Discharge Plan B Home with family;Home Health   Patient/Family in Agreement with Plan yes

## 2020-01-08 NOTE — ASSESSMENT & PLAN NOTE
Patient with previous CKD and congenital anomaly s/p Kidney transplant in 2016 and on chronic immunosuppresion presented to ED on 1/6. Cr wnl on admission and he denies any ongoing dysuria or hematuria or change in frequency. He also states he is compliant on home medications.    Home meds: Tacro, Prednisone 5, mycophenolate, fluconazole    KTM following patient. Stated that patient would need definitive ruling in or out of diverticulitis for proper ppx and immunosuppresion recommendations to be made. GI stated that patient would not be candidate for scope and that CT with contrast would be mode of diagnosing diverticulitis. (Patients previous study was limited as it was without contrast). KTM stated that patient should receive contrast for CT with 48 hours of IVF after for proper imaging to rule in/out diverticulitis.     Plan:  - KTM following; appreciate recs  - CT with contrast abdomen pelvis pending;will follow up  - Continue home prednisone and tacro  - Currently holding home mycophenolate  - Continuous fluids 100cc/hr x48 hours  - Phos repleted will follow up

## 2020-01-08 NOTE — ASSESSMENT & PLAN NOTE
Patient presented with phos of 2.3. Increased to 2.4 on 1/7 and 2.8 on 1/8.    Resolved currently. Will continue to monitor

## 2020-01-08 NOTE — CONSULTS
Please see consult note label as Progress note on 1/7/2020 by Dr Brendon Gallegos MD  Transplant Nephrology Fellow   [phone]

## 2020-01-08 NOTE — ASSESSMENT & PLAN NOTE
Patient presented to [facility] [place] 1/6 following two episodes of painless blood per rectum with some stools. He notes gross blood with some clots but denies any dark or tarry stools over the past week or on day of presentation. He denies any n/v, odynophagia, post prandial pain, dyspepsia, NSAID use, dysuria, hematuria, rectal pain or rash. He states that he has never experienced this before but does have a FH of colon cancer in his brother. Unkown last colonoscopy.     CT demonstrated limited findings given lack of contrast in pt s/p kidney transplant. It showed diverticulosis with possible diverstiiiculitis. Patients bleeding likely secondary to diverticulosis with possible diverticulitis. He had possitive occult blood but otherwise had had no bleeding since presentation. It is possible that there is ongoing bleeding. Hemoglobin was 11.4 and Lactate 1.3 on arrival.     GI was called and they noted that patient was not a candidate for scope at this time and that they could not further comment on presence of diverticulitis without better imaging. They will follow up outpatient. KTM noted that patient should receive contrast for definitive ruling on diverticulitis with 48 hours IVF. CT with contrast was suspicious for diverticulitis.     Plan:  - IVF bolus on 1/6; currently on 100cc an hour for 48 hours  - Clear liquid diet advanced to regular diet on 1/7  - Daily CBC, CMP, Mg, Phos  - Continue cipro and flagyll for diverticulitis (will discharge with regimen in future)  - o/p GI f/u

## 2020-01-09 NOTE — PLAN OF CARE
Patient is lying in bed with eyes closed.  Breathing is even and unlabored.  Bed is low and locked, and call light is within reach.  Vitals are within normal limits.  Will continue to monitor until arrival of day shift.

## 2020-01-09 NOTE — ASSESSMENT & PLAN NOTE
Patient with previous CKD and congenital anomaly s/p Kidney transplant in 2016 and on chronic immunosuppresion presented to ED on 1/6. Cr wnl on admission and he denies any ongoing dysuria or hematuria or change in frequency. He also states he is compliant on home medications.    Home meds: Tacro, Prednisone 5, mycophenolate, fluconazole    KTM following patient. Stated that patient would need definitive ruling in or out of diverticulitis for proper ppx and immunosuppresion recommendations to be made. GI stated that patient would not be candidate for scope and that CT with contrast would be mode of diagnosing diverticulitis. (Patients previous study was limited as it was without contrast). KTM stated that patient should receive contrast for CT with 48 hours of IVF after for proper imaging to rule in/out diverticulitis.     Plan:  - KTM following; appreciate recs  - 100cc IVF/hr for 48 hours following contrast administration - per KTM recs  - Continue home prednisone and tacro  - Currently holding home mycophenolate, resume after antibiotic completion

## 2020-01-09 NOTE — ASSESSMENT & PLAN NOTE
Patient with recurrent UTIs (last was strep agalactiae) is currently asymptomatic without any dysuria or hematuria or flank pain. UA was concerning for infection with UCx demonstrating >100,000 CFU of gram negative bacteria.     KTM is following and noted that they would recommend patient staying inpatient pending speciation and sensitivities of urine culture as he is currently immunosuppressed given his previous kidney transplant.     Plan:    - Currently on oral cipro and flagyll for diverticulitis  - May adjust abx regimen pending results  - Cultures with Pseudomonas sensitive to Cipro, plan to complete 10day course

## 2020-01-09 NOTE — DISCHARGE SUMMARY
[facility] Hospital Medicine  Discharge Summary      Patient Name: Alin Burkett  MRN: [id number]  Admission Date: 1/6/2020  Hospital Length of Stay: 1 days  Discharge Date and Time:  01/09/2020 4:37 PM  Attending Physician: No att. providers found   Discharging Provider: Carla Tello DO  Primary Care Provider: Carmen Krueger MD  [facility] Medicine Team: [facility] MED 1 Carla Tello DO    HPI:   Patient with history of CKD (s/p kidney transplant in 2016), chronic immunosuppression (tacro, prednisone, and mycophenolate), htn, and hypothyroidism presented to [facility] on 1/6/2020  following two episodes of painless blood per rectum on the same day. Patient notes that first episode was early in AM with the second event in afternoon prior to presentation. He endorsed some clotting and gross blood as well with both episodes. He reports no history of similar events but does endorse an associate LLQ pain with palpation but not at rest. He denies any fever, chills, n/v, odynophagia, post prandial pain, chest pain, sob, cough, dyspepsia, dysuria, hematuria, rash, edema, or pallor. He denies having any dark or tarry stools over the past week or any episodes of hematemesis.     Patient is a former smoker of 40 years (0.5 PPD) but quit over 10 years ago. He notes social alcohol use and no history of drug use. He lives at home and is functionally independent. Of note, he does endorse a family history of colon cancer in his brother.     * No surgery found *      Hospital Course:   Patient with kidney transplant and on chronic immunosuppression was admitted for 2 episodes of painless blood per rectum with associated LLQ pain on palpation at home. On arrival he was noted to have positive fecal occult and with unknown last colonoscopy. He noted no fever chills, nausea, vomiting, confusion, LH, hemoptysis, hematemesis, chest pain, sob, hematuria, dysuria. Over hospital course, he had one additional episode but with stable  hb and no leukocytosis. CT without contrast demonstrated diverticulosis with possible diverticulitis. GI consulted and said no intervention at this time was indicated but will follow up outpatient. Patient was started on cipro and flagyll. KTM consulted and recommended continuing patients home immunosuppression regimen. They also noted that patient should have a CT with contrast and 48 hours of IVF thereafter to avoid DIVINE given his kidney transplant history so that the patient can CT with contrast was suspicious for diverticulitis. Of note, he was found to have >100,000 CFU of gram negative bacteria on UCx following a dirty UA. KTM recommended waiting for speciation and sensitivities prior to discharging patient given his immunosuppressed state. Cultures with Pseudomonas sensitive to Ciprofloxacin. Patient to complete 10 day course of Ciprofloxacin and 7 day course of Metronidazole. Patient to resume cellcept after antibiotic completion.      Physical Exam   Constitutional: He is oriented to person, place, and time. He appears well-developed and well-nourished. No distress.   HENT:   Head: Normocephalic and atraumatic.   Eyes: Conjunctivae and EOM are normal. No scleral icterus.   Neck: Normal range of motion. Neck supple. No JVD present.   Cardiovascular: Normal rate, regular rhythm, normal heart sounds and intact distal pulses.   No murmur heard.  Pulmonary/Chest: Effort normal and breath sounds normal. No respiratory distress. He has no wheezes.   Abdominal: Soft. He exhibits no distension.There is no rebound and no guarding.   Musculoskeletal: Normal range of motion. He exhibits no edema or tenderness.   Neurological: He is alert and oriented to person, place, and time.   Skin: Skin is warm and dry. He is not diaphoretic. No erythema. No pallor.   Psychiatric: He has a normal mood and affect. His behavior is normal. Judgment and thought content normal.   Consults:   Consults (From admission, onward)         Status Ordering Provider     Inpatient consult to Kidney/Pancreas Transplant Medicine  Once     Provider:  (Not yet assigned)    Completed JARRETT FROST          No new Assessment & Plan notes have been filed under this hospital service since the last note was generated.  Service: Hospital Medicine    Final Active Diagnoses:    Diagnosis Date Noted POA    PRINCIPAL PROBLEM:  Rectal bleeding [K62.5] 2020 Yes    Recurrent UTI [N39.0] 2017 Yes    Hypophosphatemia [E83.39] 01/15/2017 Yes    S/P kidney transplant [Z94.0]  Not Applicable    -donor kidney transplant [Z94.0]  Not Applicable    Long-term use of immunosuppressant medication [Z79.899] 2016 Not Applicable    Hypothyroidism [E03.9] 01/10/2014 Yes     Chronic    Essential hypertension [I10]  Yes     Chronic    Congenital absence of kidney [Q60.2]  Not Applicable     Chronic      Problems Resolved During this Admission:       Discharged Condition: good    Disposition: Home or Self Care    Follow Up:  Follow-up Information     [facility].    Specialty:  Gastroenterology  Contact information:  [address] [place] [address] [phone]               Patient Instructions:      Ambulatory Referral to Gastroenterology   Referral Priority: Routine Referral Type: Consultation   Referral Reason: Specialty Services Required   Requested Specialty: Gastroenterology   Number of Visits Requested: 1       Significant Diagnostic Studies: Labs:   CMP   Recent Labs   Lab 20  0358 20  0323    140   K 3.2* 4.0   * 115*   CO2 19* 19*   GLU 91 81   BUN 13 13   CREATININE 1.3 1.2   CALCIUM 8.1* 8.6*   PROT 5.6* 5.8*   ALBUMIN 2.6* 2.7*   BILITOT 0.2 0.2   ALKPHOS 60 56   AST 17 18   ALT 14 14   ANIONGAP 7* 6*   ESTGFRAFRICA >60.0 >60.0   EGFRNONAA 56.1* >60.0    and CBC   Recent Labs   Lab 20  0358 20  0323   WBC 6.79 4.88   HGB 9.7* 9.4*   HCT 31.3* 31.6*    194        Pending Diagnostic Studies:     None         Medications:  Reconciled Home Medications:      Medication List      START taking these medications    ciprofloxacin HCl 750 MG tablet  Commonly known as:  CIPRO  Take 1 tablet (750 mg total) by mouth every 12 (twelve) hours for 5 days     metroNIDAZOLE 500 MG tablet  Commonly known as:  FLAGYL  Take 1 tablet (500 mg total) by mouth every 8 (eight) hours. for 4 days        CHANGE how you take these medications    mycophenolate 250 mg Cap  Commonly known as:  CELLCEPT  Take 3 capsules (750 mg total) by mouth 2 (two) times daily. Z94.0/Kidney transplant on 11/26/16. HOLD FOR 5 DAYS THEN RESUME 1/15/20  Start taking on:  January 13, 2020  What changed:    · how much to take  · how to take this  · when to take this  · additional instructions  · These instructions start on January 13, 2020. If you are unsure what to do until then, ask your doctor or other care provider.        CONTINUE taking these medications    aspirin 81 MG EC tablet  Commonly known as:  ECOTRIN  Take 1 tablet (81 mg total) by mouth once daily.     calcitRIOL 0.5 MCG Cap  Commonly known as:  ROCALTROL  Take 1 capsule (0.5 mcg total) by mouth once daily.     famotidine 20 MG tablet  Commonly known as:  PEPCID  Take 1 tablet (20 mg total) by mouth every evening.     ketoconazole 200 mg Tab  Commonly known as:  NIZORAL  Take 0.5 tablets (100 mg total) by mouth once daily.     levothyroxine 100 MCG tablet  Commonly known as:  Synthroid  Take 1 tablet (100 mcg total) by mouth once daily.     magnesium oxide 400 mg (241.3 mg magnesium) tablet  Commonly known as:  MAG-OX  Take 1 tablet (400 mg total) by mouth once daily.     metoprolol tartrate 25 MG tablet  Commonly known as:  LOPRESSOR  Take 0.5 tablets (12.5 mg total) by mouth 2 (two) times daily.     One Daily Multivitamin per tablet  Generic drug:  multivitamin  Take 1 tablet by mouth once daily.     PEP INJECTION (FOR RX USE)  Inject 0.25 ml as  directed.   May increase by 0.05 ml such as 0.30, 0.35 ml etc. Up to 1.00 ml Until adequate result is achieved.    For compounding pharmacy use:   Add PAPAVERINE 30 mcg  Add PHENTOLAMINE 10 mg  Add ALPROSTADIL 100 mcg     predniSONE 5 MG tablet  Commonly known as:  DELTASONE  Take 1 tablet (5 mg total) by mouth once daily. Z94.0/Kidney transplant on 11/26/2016     sodium bicarbonate 650 MG tablet  Take 1 tablet (650 mg total) by mouth 2 (two) times daily.     tacrolimus 1 MG Cap  Commonly known as:  PROGRAF  Take 3 capsules (3 mg total) by mouth every morning AND 2 capsules (2 mg total) every evening. Z94.0/Kidney Transplant on 11/26/16.            Indwelling Lines/Drains at time of discharge:   Lines/Drains/Airways     None                 Time spent on the discharge of patient: 30 minutes  Patient was seen and examined on the date of discharge and determined to be suitable for discharge.         Carla Tello DO  Department of Hospital Medicine  [facility]

## 2020-01-09 NOTE — ASSESSMENT & PLAN NOTE
Patient presented to [facility] on 1/6 following two episodes of painless blood per rectum with some stools. He notes gross blood with some clots but denies any dark or tarry stools over the past week or on day of presentation. He denies any n/v, odynophagia, post prandial pain, dyspepsia, NSAID use, dysuria, hematuria, rectal pain or rash. He states that he has never experienced this before but does have a FH of colon cancer in his brother. Unkown last colonoscopy.     CT demonstrated limited findings given lack of contrast in pt s/p kidney transplant. It showed diverticulosis with possible diverstiiiculitis. Patients bleeding likely secondary to diverticulosis with possible diverticulitis. He had possitive occult blood but otherwise had had no bleeding since presentation. It is possible that there is ongoing bleeding. Hemoglobin was 11.4 and Lactate 1.3 on arrival.     GI was called and they noted that patient was not a candidate for scope at this time and that they could not further comment on presence of diverticulitis without better imaging. They will follow up outpatient. KTM noted that patient should receive contrast for definitive ruling on diverticulitis with 48 hours IVF. CT with contrast was suspicious for diverticulitis.     Plan:  - IVF bolus on 1/6; currently on 100cc an hour for 48 hours  - Clear liquid diet advanced to regular diet on 1/7  - Daily CBC, CMP, Mg, Phos  - Continue cipro and flagyll for diverticulitis  - o/p GI f/u  - No bleeding x 2 days on discharge

## 2020-01-31 PROBLEM — A41.9 SEPSIS: Status: ACTIVE | Noted: 2020-01-01

## 2020-01-31 NOTE — TELEPHONE ENCOUNTER
Coordinator received a call from patient reporting he has a temperature of 103 & chills now. Patient states he's been having fever for about 3-4 days associated with congestion & coughing. Coordinator strongly advised patient to report to nearest ER. Patient reports he will have someone bring him to [facility] ER now.

## 2020-01-31 NOTE — ED TRIAGE NOTES
Alin Burkett, a 70 y.o. male presents to the ED via pmv with CC Chills, Generalized Body Aches, FEVER (Patient reports generalized body aches with chills x four days.. Pt is a kidney transplant pt.       Patient identifiers verified verbally with armband and correct for Alin Burkett.    LOC/ APPEARANCE: The patient is AAOx4. Pt is speaking appropriately, no slurred speech. Pt changed into hospital gown. Continuous cardiac monitor, cont pulse ox, and auto BP cuff applied to patient. Pt is clean and well groomed. No JVD visible. Pt reports pain level of 0. Pt updated on POC. Bed low and locked with side rails up x2, call bell in pt reach.  SKIN: Skin is warm dry and intact, and color is consistent with ethnicity. Capillary refill <3 seconds. No breakdown or brusing visible. Mucus membranes moist, acyanotic.  RESPIRATORY: Airway is open and patent. Respirations-spontaneous, unlabored, regular rate, equal bilaterally on inspiration and expiration. No accessory muscle use noted. Lungs clear to auscultation in all fields bilaterally anterior and posterior.   CARDIAC: Patient has regular heart rate.  No peripheral edema noted, and patient has no c/o chest pain. Peripheral pulses present equal and strong throughout.  ABDOMEN: Soft and non-tender to palpation with no distention noted. Normoactive bowel sounds x4 quadrants. Pt has no complaints of abnormal bowel movements, denies blood in stool. Pt reports normal appetite.   NEUROLOGIC: Eyes open spontaneously and facial expression symmetrical. Pt behavior appropriate to situation, and pt follows commands. Pt reports sensation present in all extremities when touched with a finger, denies any numbness or tingling. PERRLA  MUSCULOSKELETAL: Spontaneous movement noted to all extremities. Hand  equal and leg strength strong +5 bilaterally.   : No complaints of frequency, burning, urgency or blood in the urine. No complaints of incontinence.

## 2020-02-01 PROBLEM — J22 LOWER RESPIRATORY TRACT INFECTION: Status: ACTIVE | Noted: 2020-01-01

## 2020-02-01 PROBLEM — N17.9 ACUTE KIDNEY INJURY SUPERIMPOSED ON CHRONIC KIDNEY DISEASE: Status: ACTIVE | Noted: 2020-01-01

## 2020-02-01 PROBLEM — J18.9 HCAP (HEALTHCARE-ASSOCIATED PNEUMONIA): Status: ACTIVE | Noted: 2020-01-01

## 2020-02-01 PROBLEM — N30.01 ACUTE CYSTITIS WITH HEMATURIA: Status: ACTIVE | Noted: 2020-01-01

## 2020-02-01 PROBLEM — N18.30 STAGE 3 CHRONIC KIDNEY DISEASE: Chronic | Status: RESOLVED | Noted: 2017-02-20 | Resolved: 2020-01-01

## 2020-02-01 PROBLEM — N18.9 ACUTE KIDNEY INJURY SUPERIMPOSED ON CHRONIC KIDNEY DISEASE: Status: ACTIVE | Noted: 2020-01-01

## 2020-02-01 PROBLEM — N39.0 COMPLICATED UTI (URINARY TRACT INFECTION): Status: ACTIVE | Noted: 2020-01-01

## 2020-02-01 PROBLEM — R78.81 BACTEREMIA: Status: ACTIVE | Noted: 2020-01-01

## 2020-02-01 PROBLEM — A04.72 C. DIFFICILE DIARRHEA: Status: RESOLVED | Noted: 2019-08-25 | Resolved: 2020-01-01

## 2020-02-01 PROBLEM — R79.89 ELEVATED TROPONIN: Status: RESOLVED | Noted: 2019-08-20 | Resolved: 2020-01-01

## 2020-02-01 NOTE — H&P
[facility] Hospital Medicine  History & Physical    Patient Name: Alin Burkett  MRN: [id number]  Admission Date: 1/31/2020  Attending Physician: Dameon Anderson MD   Primary Care Provider: Carmen Krueger MD    [facility] Medicine Team: [facility] MED 1 Randy Villagomez MD     Patient information was obtained from patient, spouse/SO and ER records.     Subjective:     Principal Problem:Acute kidney injury superimposed on chronic kidney disease    Chief Complaint:   Chief Complaint   Patient presents with    Generalized Body Aches     Patient reports generalized body aches with chills x four days.     Chills        HPI: Mr. Burkett is a 70 year old male with ESRD s/p KTx (2016), BPH, urethral stricture s/p stenting, recurrent UTIs (Hx ESBL, VRE), AAA s/p repair, HTN, diverticulosis, and hypothyroidism that presents with productive cough, chills, sore throat, night sweats. Wife at bedside during H/P.    Patient was admitted earlier this month for acute diverticulitis (see Hospital Course below). Course was complicated by UTI that grew pan-sensitive Pseudomonas. He completed cipro x 10 days + flagyl x 7 days. He recovered fully and was back to his baseline until 1/27 when he developed sore throat. Throughout the day, he developed productive cough (swallows sputum so unsure what color). Symptoms have been progressively worse and associated with frontal headaches, chills, and occasional night sweats. He has taken excedrin for headaches with moderate improvement. He has been staying well hydrated but has had decreased appetite. Has had urinary frequency as well. Suffers from frequent UTIs that typical manifest as urethral burning prior to urination. Denies subjective fevers, nausea/vomiting, abdominal pain, dysuria, flank pain. Has diarrhea at baseline without change. Of note, he has missed last 3 doses of tacrolimus (typically takes 3mg in am, 2mg in pm).    Hospital Course from 1/6-1/9:  Patient with  kidney transplant and on chronic immunosuppression was admitted for 2 episodes of painless blood per rectum with associated LLQ pain on palpation at home. On arrival he was noted to have positive fecal occult and with unknown last colonoscopy. He noted no fever chills, nausea, vomiting, confusion, LH, hemoptysis, hematemesis, chest pain, sob, hematuria, dysuria. Over hospital course, he had one additional episode but with stable hb and no leukocytosis. CT without contrast demonstrated diverticulosis with possible diverticulitis. GI consulted and said no intervention at this time was indicated but will follow up outpatient. Patient was started on cipro and flagyll. KTM consulted and recommended continuing patients home immunosuppression regimen. They also noted that patient should have a CT with contrast and 48 hours of IVF thereafter to avoid DIVINE given his kidney transplant history so that the patient can CT with contrast was suspicious for diverticulitis. Of note, he was found to have >100,000 CFU of gram negative bacteria on UCx following a dirty UA. KTM recommended waiting for speciation and sensitivities prior to discharging patient given his immunosuppressed state. Cultures with Pseudomonas sensitive to Ciprofloxacin. Patient to complete 10 day course of Ciprofloxacin and 7 day course of Metronidazole. Patient to resume cellcept after antibiotic completion.       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  Renal allograft biopsy 1/6/17 (DIVINE): 21 glomeruli, none globally sclerosed, <5% interstitial fibrosis, no ACR, c4d negative, AVR CCT Type 2 (V1 lesion); plan THYMO     Dissecting aortic aneurysm (any part), abdominal     Diverticulosis     Encounter for blood transfusion     ESRD (end stage renal disease) 06/16/2010    H/O urethral stricture 11/27/2018    H/O: urethral stricture     History of AAA (abdominal aortic aneurysm) repair     History of urethral stricture 12/19/2018    Hypertension     Hypokalemia     Hypothyroidism 1/10/2014    Inguinal hernia bilateral, non-recurrent     Kidney stones     Organ transplant candidate 11/26/2013    Plantar warts 1/10/2014    Recurrent UTI 7/28/2017    S/P kidney transplant     Secondary hyperparathyroidism, renal     Thyroid disease        Past Surgical History:   Procedure Laterality Date    ABDOMINAL SURGERY      exploratory lapatomy x 2    ABLATION N/A 8/22/2019    Procedure: ABLATION, SVT;  Surgeon: Emerson Mcmanus MD;  Location: [facility] EP LAB;  Service: Cardiology;  Laterality: N/A;  SVT, RFA, CARTO, anes, GP, 323    AORTA - SUPERIOR MESENTERIC ARTERY BYPASS GRAFT      BLADDER NECK RECONSTRUCTION      BLADDER SURGERY      COLONOSCOPY  10/10/2013    Dr. Gutierrez, repeat in 5 years    CYSTOSCOPY      CYSTOSCOPY N/A 12/19/2018    Procedure: CYSTOSCOPY;  Surgeon: Dewey Mann MD;  Location: [address];  Service: Urology;  Laterality: N/A;  45 min    CYSTOURETHROSCOPY WITH DIRECT VISION INTERNAL URETHROTOMY N/A 12/19/2018    Procedure: CYSTOSCOPY, WITH DIRECT VISION INTERNAL URETHROTOMY;  Surgeon: Dewey Mann MD;  Location: [address];  Service: Urology;  Laterality: N/A;    DILATION OF URETHRA N/A 12/19/2018    Procedure: DILATION, URETHRA;  Surgeon: Dewey Mann MD;  Location: [facility] OR [address] [place] [address];  Service: Urology;  Laterality: N/A;    FLEXIBLE CYSTOSCOPY N/A 11/6/2019     Procedure: CYSTOSCOPY, FLEXIBLE;  Surgeon: Dewey Mann MD;  Location: [facility] OR [address] [place] [address];  Service: Urology;  Laterality: N/A;    GASTROJEJUNOSTOMY      HEMORRHOID SURGERY      HERNIA REPAIR      KIDNEY TRANSPLANT      LEFT HEART CATHETERIZATION Left 8/20/2019    Procedure: Left heart cath;  Surgeon: Javan Oscar MD;  Location: [facility] CATH/EP LAB;  Service: Cardiology;  Laterality: Left;    LITHOTRIPSY      PERCUTANEOUS NEPHROLITHOTRIPSY      right  ESWL  10/31/12    right ESWL  6/26/12    URETHROPLASTY USING PATCH GRAFT N/A 11/6/2019    Procedure: URETHROPLASTY, USING PATCH GRAFT BUCCAL MUCOSA GRAFT;  Surgeon: Dewey Mann MD;  Location: [facility] OR [address] [place] [address];  Service: Urology;  Laterality: N/A;  3 HOURS       Review of patient's allergies indicates:  No Known Allergies    No current facility-administered medications on file prior to encounter.      Current Outpatient Medications on File Prior to Encounter   Medication Sig    aspirin (ECOTRIN) 81 MG EC tablet Take 1 tablet (81 mg total) by mouth once daily.    calcitRIOL (ROCALTROL) 0.5 MCG Cap Take 1 capsule (0.5 mcg total) by mouth once daily.    famotidine (PEPCID) 20 MG tablet Take 1 tablet (20 mg total) by mouth every evening.    ketoconazole (NIZORAL) 200 mg Tab Take 0.5 tablets (100 mg total) by mouth once daily.    levothyroxine (SYNTHROID) 100 MCG tablet Take 1 tablet (100 mcg total) by mouth once daily.    magnesium oxide (MAG-OX) 400 mg (241.3 mg magnesium) tablet Take 1 tablet (400 mg total) by mouth once daily.    metoprolol tartrate (LOPRESSOR) 25 MG tablet Take 0.5 tablets (12.5 mg total) by mouth 2 (two) times daily.    multivitamin (ONE DAILY MULTIVITAMIN) per tablet Take 1 tablet by mouth once daily.    mycophenolate (CELLCEPT) 250 mg Cap Take 3 capsules (750 mg total) by mouth 2 (two) times daily. Z94.0/Kidney transplant on 11/26/16. HOLD FOR 5 DAYS THEN RESUME 1/15/20    pep injection Inject 0.25 ml as directed.   May  increase by 0.05 ml such as 0.30, 0.35 ml etc. Up to 1.00 ml Until adequate result is achieved.    For compounding pharmacy use:   Add PAPAVERINE 30 mcg  Add PHENTOLAMINE 10 mg  Add ALPROSTADIL 100 mcg    predniSONE (DELTASONE) 5 MG tablet Take 1 tablet (5 mg total) by mouth once daily. Z94.0/Kidney transplant on 2016    sodium bicarbonate 650 MG tablet Take 1 tablet (650 mg total) by mouth 2 (two) times daily.    tacrolimus (PROGRAF) 1 MG Cap Take 3 capsules (3 mg total) by mouth every morning AND 2 capsules (2 mg total) every evening. Z94.0/Kidney Transplant on 16.     Family History     Problem Relation (Age of Onset)    Alcohol abuse Father    Alzheimer's disease Mother    Cancer Brother    Colon cancer Brother    Diabetes Mother    HIV Brother    Kidney disease Paternal Uncle, Cousin    No Known Problems Sister, Daughter, Sister, Brother, Brother    Stroke Maternal Aunt        Tobacco Use    Smoking status: Former Smoker     Packs/day: 0.50     Years: 40.00     Pack years: 20.00     Types: Cigarettes     Last attempt to quit: 2010     Years since quittin.6    Smokeless tobacco: Never Used   Substance and Sexual Activity    Alcohol use: Yes     Comment: occasional/social    Drug use: No     Comment: THC in youth    Sexual activity: Yes     Partners: Female     Birth control/protection: None     Review of Systems   Constitutional: Positive for appetite change (decreased), chills, diaphoresis (night sweats), fatigue and fever.   HENT: Positive for sore throat. Negative for congestion and trouble swallowing.    Eyes: Negative for visual disturbance.   Respiratory: Positive for cough (productive cough). Negative for shortness of breath and wheezing.    Cardiovascular: Negative for chest pain, palpitations and leg swelling.   Gastrointestinal: Positive for diarrhea (diarrhea). Negative for abdominal pain, blood in stool, constipation, nausea and vomiting.   Genitourinary: Positive for  frequency. Negative for decreased urine volume, difficulty urinating, dysuria, flank pain and hematuria.   Musculoskeletal: Negative for arthralgias and myalgias.   Skin: Negative for rash.   Neurological: Positive for weakness and headaches (frontal). Negative for dizziness, light-headedness and numbness.   Psychiatric/Behavioral: Negative for agitation and confusion.     Objective:     Vital Signs (Most Recent):  Temp: 99.6 °F (37.6 °C) (01/31/20 2124)  Pulse: 91 (01/31/20 2124)  Resp: 18 (01/31/20 2124)  BP: (!) 114/58 (01/31/20 2124)  SpO2: 98 % (01/31/20 2124) Vital Signs (24h Range):  Temp:  [99.6 °F (37.6 °C)-102.8 °F (39.3 °C)] 99.6 °F (37.6 °C)  Pulse:  [] 91  Resp:  [18-20] 18  SpO2:  [96 %-99 %] 98 %  BP: (107-114)/(58-76) 114/58     Weight: 66.8 kg (147 lb 4.3 oz)  Body mass index is 20.54 kg/m².    Physical Exam   Constitutional: He is oriented to person, place, and time. He appears well-developed and well-nourished. No distress.   HENT:   Head: Normocephalic and atraumatic.   Mouth/Throat: Oropharynx is clear and moist. No oropharyngeal exudate.   Eyes: Pupils are equal, round, and reactive to light. Conjunctivae are normal.   Neck: Normal range of motion. Neck supple.   Cardiovascular: Normal rate, regular rhythm and normal heart sounds.   No murmur heard.  Pulmonary/Chest: Effort normal and breath sounds normal. No respiratory distress. He has no wheezes. He has no rales.   Abdominal: Soft. Bowel sounds are normal. He exhibits no distension. There is no tenderness. There is no guarding.   Musculoskeletal: He exhibits no edema or tenderness.   AV fistula in L arm, no erythema or tenderness   Neurological: He is alert and oriented to person, place, and time. No cranial nerve deficit. He exhibits normal muscle tone.   Skin: Skin is warm and dry. No rash noted.   Psychiatric: He has a normal mood and affect. His behavior is normal.         CRANIAL NERVES     CN III, IV, VI   Pupils are equal,  round, and reactive to light.       Significant Labs:   CBC:   Recent Labs   Lab 20  1808   WBC 7.49   HGB 11.9*   HCT 37.9*   *     CMP:   Recent Labs   Lab 20  1808      K 3.8      CO2 22*      BUN 21   CREATININE 2.0*   CALCIUM 9.8   PROT 7.2   ALBUMIN 3.3*   BILITOT 1.2*   ALKPHOS 76   AST 19   ALT 17   ANIONGAP 11   EGFRNONAA 33.1*     Lactic Acid:   Recent Labs   Lab 20  1808   LACTATE 1.1  1.1       Significant Imaging: I have reviewed and interpreted all pertinent imaging results/findings within the past 24 hours.    Assessment/Plan:     * Acute kidney injury superimposed on chronic kidney disease  Stage 3 chronic kidney disease  -donor kidney transplant  69 yo M presenting with respiratory symptoms + urinary frequency that is admitted with DIVINE on CKD of a transplanted kidney. 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 S/p kidney transplant in 2016. On tacrolimus (3mg in am, 2mg in pm) + MMF, but he has not taken medication over last few days. DDx includes UTI (UA suggestive of infection), post-renal obstruction (BPH vs urethral stricture), pre-renal azotemia 2/2 dehydration in setting of viral illness, graft dysfunction due to missing recent doses of tacro (less likely).    Plan:  - F/u UCx  - Continue zosyn  - Kidney transplant ultrasound  - KTM consulted, recs appreciated  - Resume tacrolimus + prednisone 5mg  - Hold MMF  - Trend Cr  - Strict I&Os  - Avoid nephrotoxic agents  - Renally adjust medications    Complicated UTI (urinary tract infection)  Recurrent UTI  Stricture of anterior urethra in male  BPH (benign prostatic hyperplasia)  Hx urethral stricture s/p urethral stenting in 2019  Suffers from recurrent UTIs, Hx ESBL Klebsiella + VRE  Most recent UTI grew pan-sensitive Pseudomonas earlier this month    UA with RBC 85, WBC > 100. BCx + UCx pending    Plan:  - F/u UCx  - Continue zosyn  - Renal US to  evaluate for hydronephrosis    Lower respiratory tract infection  RIP panel  Given immunosuppression and recent hospitalization, will treat for HCAP with vanc + zosyn    SVT (supraventricular tachycardia)  Continue home Lopressor      Hypothyroidism  Continue home levothyroxine      Anemia of chronic disease  CBCs daily  Transfuse with goal Hb > 7    Essential hypertension  Continue home Lopressor    VTE Risk Mitigation (From admission, onward)         Ordered     Place RASTA hose  Until discontinued      01/31/20 1953     IP VTE LOW RISK PATIENT  Once      01/31/20 1953                   Randy Villagomez MD  Department of Hospital Medicine   [facility]

## 2020-02-01 NOTE — HOSPITAL COURSE
Admitted to Hospital Medicine for evaluation/management of productive cough, sore throat, fevers. Found to have DIVINE and positive UA for many bacteria, in the setting of multiple prior UTIs and urethral stricture. RIP is positive for Rhinovirus/Enterovirus. CXR clear. No leukocytosis. BCx positive for E. Coli. Being treated for DIVINE 2/2 urosepsis. Evaluated by Kidney Transplant Medicine, tacro level in therapeutic window of 4-6. Holding home MMF until ABx are ended. Treating E. Coli bacteremia with pip-tazo which was deescalated to IV Rocephin on 2/3 after UCx susceptibilities returned. Will need to be on total of 2 weeks of therapy (ending 2/15). Tacrolimus level noted to be elevated to 7.4 on 2/4. Discussed with renal transplant who recommended decreasing tacro to 2mg BID and repeating lab in one week. Patient has been HD stable during admission. Discharge home with home health for labs and IV Rocephin care with urology appointment scheduled on 2/20/2020.

## 2020-02-01 NOTE — SUBJECTIVE & OBJECTIVE
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  Renal allograft biopsy 17 (DIVINE): 21 glomeruli, none globally sclerosed, <5% interstitial fibrosis, no ACR, c4d negative, AVR CCT Type 2 (V1 lesion); plan THYMO     Dissecting aortic aneurysm (any part), abdominal     Diverticulosis     Encounter for blood transfusion     ESRD (end stage renal disease) 2010    H/O urethral stricture 2018    H/O: urethral stricture     History of AAA (abdominal aortic aneurysm) repair     History of urethral stricture 2018    Hypertension     Hypokalemia     Hypothyroidism 1/10/2014    Inguinal hernia bilateral, non-recurrent     Kidney stones     Organ transplant candidate 2013    Plantar warts 1/10/2014    Recurrent UTI 2017    S/P kidney transplant     Secondary hyperparathyroidism, renal     Thyroid disease        Past Surgical History:   Procedure Laterality Date    ABDOMINAL SURGERY      exploratory lapatomy x 2    ABLATION N/A 2019    Procedure: ABLATION, SVT;  Surgeon: Emerson Mcmanus MD;  Location: [facility];  Service: Cardiology;  Laterality: N/A;  SVT, RFA, CARTO, anes, GP, 323    AORTA - SUPERIOR MESENTERIC ARTERY BYPASS GRAFT      BLADDER NECK RECONSTRUCTION      BLADDER SURGERY      COLONOSCOPY  10/10/2013    Dr. Gutierrez, repeat in 5 years    CYSTOSCOPY      CYSTOSCOPY N/A 2018    Procedure: CYSTOSCOPY;   Surgeon: Dewey Mann MD;  Location: [facility] OR [address] [place] [address];  Service: Urology;  Laterality: N/A;  45 min    CYSTOURETHROSCOPY WITH DIRECT VISION INTERNAL URETHROTOMY N/A 12/19/2018    Procedure: CYSTOSCOPY, WITH DIRECT VISION INTERNAL URETHROTOMY;  Surgeon: Dewey Mann MD;  [address];  Service: Urology;  Laterality: N/A;    DILATION OF URETHRA N/A 12/19/2018    Procedure: DILATION, URETHRA;  Surgeon: Dewey Mann MD;  [facility];  Service: Urology;  Laterality: N/A;    FLEXIBLE CYSTOSCOPY N/A 11/6/2019    Procedure: CYSTOSCOPY, FLEXIBLE;  Surgeon: Dewey Mann MD;  Location: [facility] OR [address] [place] [address];  Service: Urology;  Laterality: N/A;    GASTROJEJUNOSTOMY      HEMORRHOID SURGERY      HERNIA REPAIR      KIDNEY TRANSPLANT      LEFT HEART CATHETERIZATION Left 8/20/2019    Procedure: Left heart cath;  Surgeon: Javan Oscar MD;  Location: [facility] CATH/EP LAB;  Service: Cardiology;  Laterality: Left;    LITHOTRIPSY      PERCUTANEOUS NEPHROLITHOTRIPSY      right  ESWL  10/31/12    right ESWL  6/26/12    URETHROPLASTY USING PATCH GRAFT N/A 11/6/2019    Procedure: URETHROPLASTY, USING PATCH GRAFT BUCCAL MUCOSA GRAFT;  Surgeon: Dewey Mann MD;  Location: [address];  Service: Urology;  Laterality: N/A;  3 HOURS       Review of patient's allergies indicates:  No Known Allergies    No current facility-administered medications on file prior to encounter.      Current Outpatient Medications on File Prior to Encounter   Medication Sig    aspirin (ECOTRIN) 81 MG EC tablet Take 1 tablet (81 mg total) by mouth once daily.    calcitRIOL (ROCALTROL) 0.5 MCG Cap Take 1 capsule (0.5 mcg total) by mouth once daily.    famotidine (PEPCID) 20 MG tablet Take 1 tablet (20 mg total) by mouth every evening.    ketoconazole (NIZORAL) 200 mg Tab Take 0.5 tablets (100 mg total) by mouth once daily.    levothyroxine (SYNTHROID) 100 MCG tablet Take 1 tablet (100 mcg total) by mouth once daily.     magnesium oxide (MAG-OX) 400 mg (241.3 mg magnesium) tablet Take 1 tablet (400 mg total) by mouth once daily.    metoprolol tartrate (LOPRESSOR) 25 MG tablet Take 0.5 tablets (12.5 mg total) by mouth 2 (two) times daily.    multivitamin (ONE DAILY MULTIVITAMIN) per tablet Take 1 tablet by mouth once daily.    mycophenolate (CELLCEPT) 250 mg Cap Take 3 capsules (750 mg total) by mouth 2 (two) times daily. Z94.0/Kidney transplant on 16. HOLD FOR 5 DAYS THEN RESUME 1/15/20    pep injection Inject 0.25 ml as directed.   May increase by 0.05 ml such as 0.30, 0.35 ml etc. Up to 1.00 ml Until adequate result is achieved.    For compounding pharmacy use:   Add PAPAVERINE 30 mcg  Add PHENTOLAMINE 10 mg  Add ALPROSTADIL 100 mcg    predniSONE (DELTASONE) 5 MG tablet Take 1 tablet (5 mg total) by mouth once daily. Z94.0/Kidney transplant on 2016    sodium bicarbonate 650 MG tablet Take 1 tablet (650 mg total) by mouth 2 (two) times daily.    tacrolimus (PROGRAF) 1 MG Cap Take 3 capsules (3 mg total) by mouth every morning AND 2 capsules (2 mg total) every evening. Z94.0/Kidney Transplant on 16.     Family History     Problem Relation (Age of Onset)    Alcohol abuse Father    Alzheimer's disease Mother    Cancer Brother    Colon cancer Brother    Diabetes Mother    HIV Brother    Kidney disease Paternal Uncle, Cousin    No Known Problems Sister, Daughter, Sister, Brother, Brother    Stroke Maternal Aunt        Tobacco Use    Smoking status: Former Smoker     Packs/day: 0.50     Years: 40.00     Pack years: 20.00     Types: Cigarettes     Last attempt to quit: 2010     Years since quittin.6    Smokeless tobacco: Never Used   Substance and Sexual Activity    Alcohol use: Yes     Comment: occasional/social    Drug use: No     Comment: THC in youth    Sexual activity: Yes     Partners: Female     Birth control/protection: None     Review of Systems   Constitutional: Positive for appetite  change (decreased), chills, diaphoresis (night sweats), fatigue and fever.   HENT: Positive for sore throat. Negative for congestion and trouble swallowing.    Eyes: Negative for visual disturbance.   Respiratory: Positive for cough (productive cough). Negative for shortness of breath and wheezing.    Cardiovascular: Negative for chest pain, palpitations and leg swelling.   Gastrointestinal: Positive for diarrhea (diarrhea). Negative for abdominal pain, blood in stool, constipation, nausea and vomiting.   Genitourinary: Positive for frequency. Negative for decreased urine volume, difficulty urinating, dysuria, flank pain and hematuria.   Musculoskeletal: Negative for arthralgias and myalgias.   Skin: Negative for rash.   Neurological: Positive for weakness and headaches (frontal). Negative for dizziness, light-headedness and numbness.   Psychiatric/Behavioral: Negative for agitation and confusion.     Objective:     Vital Signs (Most Recent):  Temp: 99.6 °F (37.6 °C) (01/31/20 2124)  Pulse: 91 (01/31/20 2124)  Resp: 18 (01/31/20 2124)  BP: (!) 114/58 (01/31/20 2124)  SpO2: 98 % (01/31/20 2124) Vital Signs (24h Range):  Temp:  [99.6 °F (37.6 °C)-102.8 °F (39.3 °C)] 99.6 °F (37.6 °C)  Pulse:  [] 91  Resp:  [18-20] 18  SpO2:  [96 %-99 %] 98 %  BP: (107-114)/(58-76) 114/58     Weight: 66.8 kg (147 lb 4.3 oz)  Body mass index is 20.54 kg/m².    Physical Exam   Constitutional: He is oriented to person, place, and time. He appears well-developed and well-nourished. No distress.   HENT:   Head: Normocephalic and atraumatic.   Mouth/Throat: Oropharynx is clear and moist. No oropharyngeal exudate.   Eyes: Pupils are equal, round, and reactive to light. Conjunctivae are normal.   Neck: Normal range of motion. Neck supple.   Cardiovascular: Normal rate, regular rhythm and normal heart sounds.   No murmur heard.  Pulmonary/Chest: Effort normal and breath sounds normal. No respiratory distress. He has no wheezes. He has no  rales.   Abdominal: Soft. Bowel sounds are normal. He exhibits no distension. There is no tenderness. There is no guarding.   Musculoskeletal: He exhibits no edema or tenderness.   AV fistula in L arm, no erythema or tenderness   Neurological: He is alert and oriented to person, place, and time. No cranial nerve deficit. He exhibits normal muscle tone.   Skin: Skin is warm and dry. No rash noted.   Psychiatric: He has a normal mood and affect. His behavior is normal.         CRANIAL NERVES     CN III, IV, VI   Pupils are equal, round, and reactive to light.       Significant Labs:   CBC:   Recent Labs   Lab 01/31/20  1808   WBC 7.49   HGB 11.9*   HCT 37.9*   *     CMP:   Recent Labs   Lab 01/31/20  1808      K 3.8      CO2 22*      BUN 21   CREATININE 2.0*   CALCIUM 9.8   PROT 7.2   ALBUMIN 3.3*   BILITOT 1.2*   ALKPHOS 76   AST 19   ALT 17   ANIONGAP 11   EGFRNONAA 33.1*     Lactic Acid:   Recent Labs   Lab 01/31/20  1808   LACTATE 1.1  1.1       Significant Imaging: I have reviewed and interpreted all pertinent imaging results/findings within the past 24 hours.

## 2020-02-01 NOTE — NURSING
Pt arrived  to room 630 with nurse wife accompanying. Denies pain and discomfort. VSS. Urine collected and sent. Safety maintained. Will monitor. Pt belongingness, pant, shirt, jacket, shoes, wallet, cell phone, keys.

## 2020-02-01 NOTE — ASSESSMENT & PLAN NOTE
Hx urethral stricture s/p urethral stenting in 11/2019  Suffers from recurrent UTIs, Hx ESBL Klebsiella + VRE  Most recent UTI grew pan-sensitive Pseudomonas earlier this month    UA with RBC 85, WBC > 100. BCx + UCx pending    Plan:  - F/u UCx  - Continue zosyn  - Renal US to evaluate for hydronephrosis

## 2020-02-01 NOTE — ASSESSMENT & PLAN NOTE
71 yo M presenting with respiratory symptoms + urinary frequency that is admitted with DIVINE on CKD of a transplanted kidney. 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 S/p kidney transplant in 2016. On tacrolimus (3mg in am, 2mg in pm) + MMF, but he has not taken medication over last few days. DDx includes UTI (UA suggestive of infection), post-renal obstruction (BPH vs urethral stricture), pre-renal azotemia 2/2 dehydration in setting of viral illness, graft dysfunction due to missing recent doses of tacro (less likely).    Plan:  - F/u UCx  - Continue zosyn  - Kidney transplant ultrasound  - KTM consulted, recs appreciated  - Resume tacrolimus + prednisone 5mg  - Hold MMF  - Trend Cr  - Strict I&Os  - Avoid nephrotoxic agents  - Renally adjust medications

## 2020-02-01 NOTE — H&P
[facility] Hospital Medicine  History & Physical    Patient Name: Alin Burkett  MRN: [id number]  Admission Date: 1/31/2020  Attending Physician: Dameon Anderson MD   Primary Care Provider: Carmen Krueger MD    [facility] Team: [facility] 1 Randy Villagomez MD     Patient information was obtained from patient and ER records.     Subjective:     Principal Problem:Acute kidney injury superimposed on chronic kidney disease    Chief Complaint:   Chief Complaint   Patient presents with    Generalized Body Aches     Patient reports generalized body aches with chills x four days.     Chills        HPI: Mr. Burkett is a 70 year old male with ESRD s/p KTx (2016), BPH, urethral stricture s/p stenting, recurrent UTIs (Hx ESBL, VRE), AAA s/p repair, HTN, diverticulosis, and hypothyroidism that presents with productive cough, chills, sore throat, night sweats. Wife at bedside during H/P.    Patient was admitted earlier this month for acute diverticulitis (see Hospital Course below). Course was complicated by UTI that grew pan-sensitive Pseudomonas. He completed cipro x 10 days + flagyl x 7 days. He recovered fully and was back to his baseline until 1/27 when he developed sore throat. Throughout the day, he developed productive cough (swallows sputum so unsure what color). Symptoms have been progressively worse and associated with frontal headaches, chills, and occasional night sweats. He has taken excedrin for headaches with moderate improvement. He has been staying well hydrated but has had decreased appetite. Has had urinary frequency as well. Suffers from frequent UTIs that typical manifest as urethral burning prior to urination. Denies subjective fevers, nausea/vomiting, abdominal pain, dysuria, flank pain. Has diarrhea at baseline without change. Of note, he has missed last 3 doses of tacrolimus (typically takes 3mg in am, 2mg in pm).    Hospital Course from 1/6-1/9:  Patient with kidney  transplant and on chronic immunosuppression was admitted for 2 episodes of painless blood per rectum with associated LLQ pain on palpation at home. On arrival he was noted to have positive fecal occult and with unknown last colonoscopy. He noted no fever chills, nausea, vomiting, confusion, LH, hemoptysis, hematemesis, chest pain, sob, hematuria, dysuria. Over hospital course, he had one additional episode but with stable hb and no leukocytosis. CT without contrast demonstrated diverticulosis with possible diverticulitis. GI consulted and said no intervention at this time was indicated but will follow up outpatient. Patient was started on cipro and flagyll. KTM consulted and recommended continuing patients home immunosuppression regimen. They also noted that patient should have a CT with contrast and 48 hours of IVF thereafter to avoid DIVINE given his kidney transplant history so that the patient can CT with contrast was suspicious for diverticulitis. Of note, he was found to have >100,000 CFU of gram negative bacteria on UCx following a dirty UA. KTM recommended waiting for speciation and sensitivities prior to discharging patient given his immunosuppressed state. Cultures with Pseudomonas sensitive to Ciprofloxacin. Patient to complete 10 day course of Ciprofloxacin and 7 day course of Metronidazole. Patient to resume cellcept after antibiotic completion.       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  Renal allograft biopsy 1/6/17 (DIVINE): 21 glomeruli, none globally sclerosed, <5% interstitial fibrosis, no ACR, c4d negative, AVR CCT Type 2 (V1 lesion); plan THYMO     Dissecting aortic aneurysm (any part), abdominal     Diverticulosis     Encounter for blood transfusion     ESRD (end stage renal disease) 06/16/2010    H/O urethral stricture 11/27/2018    H/O: urethral stricture     History of AAA (abdominal aortic aneurysm) repair     History of urethral stricture 12/19/2018    Hypertension     Hypokalemia     Hypothyroidism 1/10/2014    Inguinal hernia bilateral, non-recurrent     Kidney stones     Organ transplant candidate 11/26/2013    Plantar warts 1/10/2014    Recurrent UTI 7/28/2017    S/P kidney transplant     Secondary hyperparathyroidism, renal     Thyroid disease        Past Surgical History:   Procedure Laterality Date    ABDOMINAL SURGERY      exploratory lapatomy x 2    ABLATION N/A 8/22/2019    Procedure: ABLATION, SVT;  Surgeon: Emerson Mcmanus MD;  Location: [facility] EP LAB;  Service: Cardiology;  Laterality: N/A;  SVT, RFA, CARTO, anes, GP, 323    AORTA - SUPERIOR MESENTERIC ARTERY BYPASS GRAFT      BLADDER NECK RECONSTRUCTION      BLADDER SURGERY      COLONOSCOPY  10/10/2013    Dr. Gutierrez, repeat in 5 years    CYSTOSCOPY      CYSTOSCOPY N/A 12/19/2018    Procedure: CYSTOSCOPY;  Surgeon: Dewey Mann MD;  Location: [facility] OR [address] [place] [address];  Service: Urology;  Laterality: N/A;  45 min    CYSTOURETHROSCOPY WITH DIRECT VISION INTERNAL URETHROTOMY N/A 12/19/2018    Procedure: CYSTOSCOPY, WITH DIRECT VISION INTERNAL URETHROTOMY;  Surgeon: Dewey Mann MD;  Location: [facility] OR [address] [place] [address];  Service: Urology;  Laterality: N/A;    DILATION OF URETHRA N/A 12/19/2018    Procedure: DILATION, URETHRA;  Surgeon: Dewey Mann MD;  Location: [facility] OR [address] [place] [address];  Service: Urology;  Laterality: N/A;    FLEXIBLE CYSTOSCOPY N/A 11/6/2019    Procedure: CYSTOSCOPY,  FLEXIBLE;  Surgeon: Dewey Mann MD;  [facility];  Service: Urology;  Laterality: N/A;    GASTROJEJUNOSTOMY      HEMORRHOID SURGERY      HERNIA REPAIR      KIDNEY TRANSPLANT      LEFT HEART CATHETERIZATION Left 8/20/2019    Procedure: Left heart cath;  Surgeon: Javan Oscar MD;  Location: [facility] CATH/EP LAB;  Service: Cardiology;  Laterality: Left;    LITHOTRIPSY      PERCUTANEOUS NEPHROLITHOTRIPSY      right  ESWL  10/31/12    right ESWL  6/26/12    URETHROPLASTY USING PATCH GRAFT N/A 11/6/2019    Procedure: URETHROPLASTY, USING PATCH GRAFT BUCCAL MUCOSA GRAFT;  Surgeon: Dewey Mann MD;  Location: [facility] OR [address] [place] [address];  Service: Urology;  Laterality: N/A;  3 HOURS       Review of patient's allergies indicates:  No Known Allergies    No current facility-administered medications on file prior to encounter.      Current Outpatient Medications on File Prior to Encounter   Medication Sig    aspirin (ECOTRIN) 81 MG EC tablet Take 1 tablet (81 mg total) by mouth once daily.    calcitRIOL (ROCALTROL) 0.5 MCG Cap Take 1 capsule (0.5 mcg total) by mouth once daily.    famotidine (PEPCID) 20 MG tablet Take 1 tablet (20 mg total) by mouth every evening.    ketoconazole (NIZORAL) 200 mg Tab Take 0.5 tablets (100 mg total) by mouth once daily.    levothyroxine (SYNTHROID) 100 MCG tablet Take 1 tablet (100 mcg total) by mouth once daily.    magnesium oxide (MAG-OX) 400 mg (241.3 mg magnesium) tablet Take 1 tablet (400 mg total) by mouth once daily.    metoprolol tartrate (LOPRESSOR) 25 MG tablet Take 0.5 tablets (12.5 mg total) by mouth 2 (two) times daily.    multivitamin (ONE DAILY MULTIVITAMIN) per tablet Take 1 tablet by mouth once daily.    mycophenolate (CELLCEPT) 250 mg Cap Take 3 capsules (750 mg total) by mouth 2 (two) times daily. Z94.0/Kidney transplant on 11/26/16. HOLD FOR 5 DAYS THEN RESUME 1/15/20    pep injection Inject 0.25 ml as directed.   May increase by 0.05 ml such as  0.30, 0.35 ml etc. Up to 1.00 ml Until adequate result is achieved.    For compounding pharmacy use:   Add PAPAVERINE 30 mcg  Add PHENTOLAMINE 10 mg  Add ALPROSTADIL 100 mcg    predniSONE (DELTASONE) 5 MG tablet Take 1 tablet (5 mg total) by mouth once daily. Z94.0/Kidney transplant on 2016    sodium bicarbonate 650 MG tablet Take 1 tablet (650 mg total) by mouth 2 (two) times daily.    tacrolimus (PROGRAF) 1 MG Cap Take 3 capsules (3 mg total) by mouth every morning AND 2 capsules (2 mg total) every evening. Z94.0/Kidney Transplant on 16.     Family History     Problem Relation (Age of Onset)    Alcohol abuse Father    Alzheimer's disease Mother    Cancer Brother    Colon cancer Brother    Diabetes Mother    HIV Brother    Kidney disease Paternal Uncle, Cousin    No Known Problems Sister, Daughter, Sister, Brother, Brother    Stroke Maternal Aunt        Tobacco Use    Smoking status: Former Smoker     Packs/day: 0.50     Years: 40.00     Pack years: 20.00     Types: Cigarettes     Last attempt to quit: 2010     Years since quittin.6    Smokeless tobacco: Never Used   Substance and Sexual Activity    Alcohol use: Yes     Comment: occasional/social    Drug use: No     Comment: THC in youth    Sexual activity: Yes     Partners: Female     Birth control/protection: None     Review of Systems   Constitutional: Positive for appetite change (decreased), chills, diaphoresis (night sweats), fatigue and fever.   HENT: Positive for sore throat. Negative for congestion and trouble swallowing.    Eyes: Negative for visual disturbance.   Respiratory: Positive for cough (productive cough). Negative for shortness of breath and wheezing.    Cardiovascular: Negative for chest pain, palpitations and leg swelling.   Gastrointestinal: Positive for diarrhea (diarrhea). Negative for abdominal pain, blood in stool, constipation, nausea and vomiting.   Genitourinary: Positive for frequency. Negative for  decreased urine volume, difficulty urinating, dysuria, flank pain and hematuria.   Musculoskeletal: Negative for arthralgias and myalgias.   Skin: Negative for rash.   Neurological: Positive for weakness and headaches (frontal). Negative for dizziness, light-headedness and numbness.   Psychiatric/Behavioral: Negative for agitation and confusion.     Objective:     Vital Signs (Most Recent):  Temp: 99.6 °F (37.6 °C) (01/31/20 2124)  Pulse: 91 (01/31/20 2124)  Resp: 18 (01/31/20 2124)  BP: (!) 114/58 (01/31/20 2124)  SpO2: 98 % (01/31/20 2124) Vital Signs (24h Range):  Temp:  [99.6 °F (37.6 °C)-102.8 °F (39.3 °C)] 99.6 °F (37.6 °C)  Pulse:  [] 91  Resp:  [18-20] 18  SpO2:  [96 %-99 %] 98 %  BP: (107-114)/(58-76) 114/58     Weight: 66.8 kg (147 lb 4.3 oz)  Body mass index is 20.54 kg/m².    Physical Exam   Constitutional: He is oriented to person, place, and time. He appears well-developed and well-nourished. No distress.   HENT:   Head: Normocephalic and atraumatic.   Mouth/Throat: Oropharynx is clear and moist. No oropharyngeal exudate.   Eyes: Pupils are equal, round, and reactive to light. Conjunctivae are normal.   Neck: Normal range of motion. Neck supple.   Cardiovascular: Normal rate, regular rhythm and normal heart sounds.   No murmur heard.  Pulmonary/Chest: Effort normal and breath sounds normal. No respiratory distress. He has no wheezes. He has no rales.   Abdominal: Soft. Bowel sounds are normal. He exhibits no distension. There is no tenderness. There is no guarding.   Musculoskeletal: He exhibits no edema or tenderness.   AV fistula in L arm, no erythema or tenderness   Neurological: He is alert and oriented to person, place, and time. No cranial nerve deficit. He exhibits normal muscle tone.   Skin: Skin is warm and dry. No rash noted.   Psychiatric: He has a normal mood and affect. His behavior is normal.         CRANIAL NERVES     CN III, IV, VI   Pupils are equal, round, and reactive to  light.       Significant Labs:   CBC:   Recent Labs   Lab 01/31/20  1808   WBC 7.49   HGB 11.9*   HCT 37.9*   *     CMP:   Recent Labs   Lab 01/31/20  1808      K 3.8      CO2 22*      BUN 21   CREATININE 2.0*   CALCIUM 9.8   PROT 7.2   ALBUMIN 3.3*   BILITOT 1.2*   ALKPHOS 76   AST 19   ALT 17   ANIONGAP 11   EGFRNONAA 33.1*     Lactic Acid:   Recent Labs   Lab 01/31/20  1808   LACTATE 1.1  1.1       Significant Imaging: I have reviewed and interpreted all pertinent imaging results/findings within the past 24 hours.    Assessment/Plan:     * Acute kidney injury superimposed on chronic kidney disease  71 yo M presenting with respiratory symptoms + urinary frequency that is admitted with DIVINE on CKD of a transplanted kidney. 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 S/p kidney transplant in 2016. On tacrolimus (3mg in am, 2mg in pm) + MMF, but he has not taken medication over last few days. DDx includes UTI (UA suggestive of infection), post-renal obstruction (BPH vs urethral stricture), pre-renal azotemia 2/2 dehydration in setting of viral illness, graft dysfunction due to missing recent doses of tacro (less likely).    Plan:  - F/u UCx  - Continue zosyn  - Kidney transplant ultrasound  - KTM consulted, recs appreciated  - Resume tacrolimus + prednisone 5mg  - Hold MMF  - Trend Cr  - Strict I&Os  - Avoid nephrotoxic agents  - Renally adjust medications    Complicated UTI (urinary tract infection)  Hx urethral stricture s/p urethral stenting in 11/2019  Suffers from recurrent UTIs, Hx ESBL Klebsiella + VRE  Most recent UTI grew pan-sensitive Pseudomonas earlier this month    UA with RBC 85, WBC > 100. BCx + UCx pending    Plan:  - F/u UCx  - Continue zosyn  - Renal US to evaluate for hydronephrosis    Lower respiratory tract infection  RIP panel  Given immunosuppression, will treat for HCAP with vanc + zosyn    SVT (supraventricular  tachycardia)  Continue home Lopressor      Hypothyroidism  Continue home levothyroxine      Anemia of chronic disease  CBCs daily  Transfuse with goal Hb > 7    Essential hypertension  Continue home Lopressor    VTE Risk Mitigation (From admission, onward)         Ordered     Place RASTA hose  Until discontinued      01/31/20 1953     IP VTE LOW RISK PATIENT  Once      01/31/20 1953                   Randy Villagomez MD  Department of Hospital Medicine   [facility]

## 2020-02-01 NOTE — HPI
Mr. Burkett is a 70 year old male with ESRD s/p KTx (2016), BPH, urethral stricture s/p stenting, recurrent UTIs (Hx ESBL, VRE), AAA s/p repair, HTN, diverticulosis, and hypothyroidism that presents with productive cough, chills, sore throat, night sweats. Wife at bedside during H/P.    Patient was admitted earlier this month for acute diverticulitis (see Hospital Course below). Course was complicated by UTI that grew pan-sensitive Pseudomonas. He completed cipro x 10 days + flagyl x 7 days. He recovered fully and was back to his baseline until 1/27 when he developed sore throat. Throughout the day, he developed productive cough (swallows sputum so unsure what color). Symptoms have been progressively worse and associated with frontal headaches, chills, and occasional night sweats. He has taken excedrin for headaches with moderate improvement. He has been staying well hydrated but has had decreased appetite. Has had urinary frequency as well. Suffers from frequent UTIs that typical manifest as urethral burning prior to urination. Denies subjective fevers, nausea/vomiting, abdominal pain, dysuria, flank pain. Has diarrhea at baseline without change. Of note, he has missed last 3 doses of tacrolimus (typically takes 3mg in am, 2mg in pm).    Hospital Course from 1/6-1/9:  Patient with kidney transplant and on chronic immunosuppression was admitted for 2 episodes of painless blood per rectum with associated LLQ pain on palpation at home. On arrival he was noted to have positive fecal occult and with unknown last colonoscopy. He noted no fever chills, nausea, vomiting, confusion, LH, hemoptysis, hematemesis, chest pain, sob, hematuria, dysuria. Over hospital course, he had one additional episode but with stable hb and no leukocytosis. CT without contrast demonstrated diverticulosis with possible diverticulitis. GI consulted and said no intervention at this time was indicated but will follow up outpatient. Patient was  started on cipro and flagyll. KTM consulted and recommended continuing patients home immunosuppression regimen. They also noted that patient should have a CT with contrast and 48 hours of IVF thereafter to avoid DIVINE given his kidney transplant history so that the patient can CT with contrast was suspicious for diverticulitis. Of note, he was found to have >100,000 CFU of gram negative bacteria on UCx following a dirty UA. KTM recommended waiting for speciation and sensitivities prior to discharging patient given his immunosuppressed state. Cultures with Pseudomonas sensitive to Ciprofloxacin. Patient to complete 10 day course of Ciprofloxacin and 7 day course of Metronidazole. Patient to resume cellcept after antibiotic completion.

## 2020-02-01 NOTE — PROGRESS NOTES
Pharmacokinetic Initial Assessment: IV Vancomycin    Assessment/Plan:    Initiate intravenous vancomycin with loading dose of 1750 mg once followed by a maintenance dose of vancomycin 750mg IV every 24 hours  Desired empiric serum trough concentration is 10 to 20 mcg/mL  Draw vancomycin trough level 30 min prior to third dose on 2/2 at approximately 1830  Pharmacy will continue to follow and monitor vancomycin.      Please contact pharmacy at extension [phone] with any questions regarding this assessment.     Thank you for the consult,   Nelly Prasad       Patient brief summary:  Alin Burkett is a 70 y.o. male initiated on antimicrobial therapy with IV Vancomycin for treatment of suspected fever in kidney transplant, possible UTI vs PNA    Drug Allergies:   Review of patient's allergies indicates:  No Known Allergies    Actual Body Weight:   66.8kg    Renal Function:   Estimated Creatinine Clearance: 32.9 mL/min (A) (based on SCr of 2 mg/dL (H)).,     Dialysis Method (if applicable):  N/A    CBC (last 72 hours):  Recent Labs   Lab Result Units 01/31/20  1808   WBC K/uL 7.49   Hemoglobin g/dL 11.9*   Hematocrit % 37.9*   Platelets K/uL 117*   Gran% % 85.1*   Lymph% % 4.5*   Mono% % 9.6   Eosinophil% % 0.3   Basophil% % 0.1   Differential Method  Automated       Metabolic Panel (last 72 hours):  Recent Labs   Lab Result Units 01/31/20  1808 01/31/20  2152   Sodium mmol/L 136  --    Potassium mmol/L 3.8  --    Chloride mmol/L 103  --    CO2 mmol/L 22*  --    Glucose mg/dL 106  --    Glucose, UA   --  Negative   BUN, Bld mg/dL 21  --    Creatinine mg/dL 2.0*  --    Albumin g/dL 3.3*  --    Total Bilirubin mg/dL 1.2*  --    Alkaline Phosphatase U/L 76  --    AST U/L 19  --    ALT U/L 17  --        Drug levels (last 3 results):  No results for input(s): VANCOMYCINRA, VANCOMYCINPE, VANCOMYCINTR in the last 72 hours.    Microbiologic Results:  Microbiology Results (last 7 days)     Procedure Component Value Units  Date/Time    Urine culture [[id number]] Collected:  01/31/20 2152    Order Status:  No result Specimen:  Urine Updated:  01/31/20 2237    Culture, Respiratory with Gram Stain [[id number]]     Order Status:  No result Specimen:  Respiratory     Respiratory Infection Panel, Nasopharyngeal [[id number]]     Order Status:  No result Specimen:  Nasopharyngeal Swab     Influenza A & B by Molecular [[id number]] Collected:  01/31/20 1809    Order Status:  Completed Specimen:  Nasopharyngeal Swab Updated:  01/31/20 1845     Influenza A, Molecular Negative     Influenza B, Molecular Negative     Flu A & B Source Nasal swab    Blood culture x two cultures. Draw prior to antibiotics. [[id number]] Collected:  01/31/20 1809    Order Status:  Sent Specimen:  Blood from Peripheral, Wrist, Left Updated:  01/31/20 1821    Blood culture x two cultures. Draw prior to antibiotics. [[id number]] Collected:  01/31/20 1809    Order Status:  Sent Specimen:  Blood from Peripheral, Forearm, Left Updated:  01/31/20 1820    Urine culture - High Risk **CANNOT BE ORDERED STAT** [[id number]]     Order Status:  No result Specimen:  Urine

## 2020-02-01 NOTE — HPI
Alin is a 69 yo AAM post renal transplant 2016 who presented with feeling poorly, cough, chills, night sweats and sore throat. He reports sx started Monday and URI sx have gotten better, but he actually felt worse and started with HA. Found + rhinovirus, UA with pyuria suspicious for another UTI and GNR bacteremia.  Recently admitted for UTI and diverticulitis, and has completed atb for that. He follows with Dr. Mann for urethral stricture and stated he has had no LUTS symptoms since urethral dilation last fall.  IS: tacrolimus 3/2 and  mg BID. MMF was held while on antibiotics and recently restarted.

## 2020-02-01 NOTE — SUBJECTIVE & OBJECTIVE
Subjective:     History of Present Illness:   Alin is a 69 yo AAM post renal transplant 2016 who presented with feeling poorly, cough, chills, night sweats and sore throat. He reports sx started Monday and URI sx have gotten better, but he actually felt worse and started with HA. Found + rhinovirus, UA with pyuria suspicious for another UTI and GNR bacteremia.  Recently admitted for UTI and diverticulitis, and has completed atb for that. He follows with Dr. Mann for urethral stricture and stated he has had no LUTS symptoms since urethral dilation last fall.  IS: tacrolimus 3 and  mg BID. MMF was held while on antibiotics and recently restarted.    Interval History:  Feels better since admission. He feels URI sx are further improved since admission. He has a residual cough with scant sputum. Denies SOB or CP. Chills and overall poor feeling improved. Denies LUTS whatsoever, foul odor, poor stream.    Past Medical and Surgical History: Mr. 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), Cancer, Celiac artery dissection, Chronic diarrhea, Chronic urethral stricture, Congenital absence of kidney, -donor kidney transplant 16, Dissecting aortic aneurysm (any part), abdominal, Diverticulosis, Encounter for blood transfusion, ESRD (end stage renal disease) (2010), H/O urethral stricture (2018), H/O: urethral stricture, History of AAA (abdominal aortic aneurysm) repair, History of urethral stricture (2018), Hypertension, Hypokalemia, Hypothyroidism (1/10/2014), Inguinal hernia bilateral, non-recurrent, Kidney stones, Organ transplant candidate (2013), Plantar warts (1/10/2014), Recurrent UTI (2017), S/P kidney transplant,  Secondary hyperparathyroidism, renal, and Thyroid disease.  He has a past surgical history that includes Gastrojejunostomy; Hernia repair; Hemorrhoid surgery; Aorta - superior mesenteric artery bypass graft; Percutaneous nephrolithotripsy; right ESWL (6/26/12); right  ESWL (10/31/12); Bladder neck reconstruction; Abdominal surgery; Bladder surgery; Cystoscopy; Lithotripsy; Kidney transplant; Colonoscopy (10/10/2013); Cystoscopy (N/A, 12/19/2018); Cystourethroscopy with direct vision internal urethrotomy (N/A, 12/19/2018); Dilation of urethra (N/A, 12/19/2018); Ablation (N/A, 8/22/2019); Left heart catheterization (Left, 8/20/2019); Urethroplasty using patch graft (N/A, 11/6/2019); and Flexible cystoscopy (N/A, 11/6/2019).    Past Social and Family History: Mr. Burkett reports that he quit smoking about 9 years ago. His smoking use included cigarettes. He has a 20.00 pack-year smoking history. He has never used smokeless tobacco. He reports that he drinks alcohol. He reports that he does not use drugs.His family history includes Alcohol abuse in his father; Alzheimer's disease in his mother; Cancer in his brother; Colon cancer in his brother; Diabetes in his mother; HIV in his brother; Kidney disease in his cousin and paternal uncle; No Known Problems in his brother, brother, daughter, sister, and sister; Stroke in his maternal aunt.    Intake/Output - Last 3 Shifts       01/30 0700 - 01/31 0659 01/31 0700 - 02/01 0659 02/01 0700 - 02/02 0659    P.O.  240     IV Piggyback  200     Total Intake(mL/kg)  440 (6.6)     Urine (mL/kg/hr)  650     Stool  0     Total Output  650     Net  -210            Urine Occurrence  0 x     Stool Occurrence  1 x            Review of Systems   Constitutional: Positive for activity change and fatigue.   HENT: Positive for sinus pressure.    Respiratory: Positive for cough. Negative for shortness of breath.    Cardiovascular: Negative for chest pain and leg swelling.   Gastrointestinal:  "Negative for nausea and vomiting.   Genitourinary: Negative for decreased urine volume, difficulty urinating, dysuria and hematuria.   Musculoskeletal: Negative for arthralgias.   Skin: Negative for rash.   Allergic/Immunologic: Positive for immunocompromised state.   Neurological: Negative for weakness.   Hematological: Does not bruise/bleed easily.   Psychiatric/Behavioral: Negative for sleep disturbance.     Objective:     Vital Signs (Most Recent):  Temp: 99.2 °F (37.3 °C) (02/01/20 1131)  Pulse: 78 (02/01/20 1131)  Resp: 16 (02/01/20 1131)  BP: 99/65 (02/01/20 1131)  SpO2: 96 % (02/01/20 1131) Vital Signs (24h Range):  Temp:  [98.2 °F (36.8 °C)-102.8 °F (39.3 °C)] 99.2 °F (37.3 °C)  Pulse:  [] 78  Resp:  [16-20] 16  SpO2:  [96 %-99 %] 96 %  BP: ()/(58-76) 99/65     Weight: 66.8 kg (147 lb 4.3 oz)  Height: 5' 11" (180.3 cm)  Body mass index is 20.54 kg/m².    Physical Exam   Constitutional: He is oriented to person, place, and time. He appears well-developed and well-nourished. He is cooperative. No distress.   HENT:   Mouth/Throat: Mucous membranes are normal. No oral lesions.   Eyes: Conjunctivae and lids are normal. No scleral icterus.   Neck: Trachea normal. Neck supple. No JVD present. No thyroid mass present.   Cardiovascular: Normal rate and regular rhythm.   Pulmonary/Chest: Effort normal. He has no rales.   Abdominal: Soft. He exhibits no mass. There is no hepatosplenomegaly. There is no tenderness. There is no CVA tenderness.   Musculoskeletal: Normal range of motion. He exhibits no edema.   Neurological: He is alert and oriented to person, place, and time. He displays no tremor. Gait normal.   Skin: Skin is warm and dry. No lesion and no rash noted. No cyanosis. Nails show no clubbing.   Psychiatric: He has a normal mood and affect. His speech is normal. Cognition and memory are normal.     Significant Labs:  Recent Labs   Lab 01/31/20  1808 02/01/20  0226    138   K 3.8 4.0    " 107   CO2 22* 20*   BUN 21 22   CREATININE 2.0* 1.9*   CALCIUM 9.8 8.8   PHOS  --  2.6*     Diagnostics:  US - Kidney:   Results for orders placed during the hospital encounter of 01/31/20   US Transplant Kidney With Doppler    Narrative EXAMINATION:  US TRANSPLANT KIDNEY WITH DOPPLER    CLINICAL HISTORY:  h/o KTx now with DIVINE, h/o obstruction;    TECHNIQUE:  Transplant renal ultrasound of the right lower quadrant with color flow doppler and duplex analysis.    COMPARISON:  Ultrasound transplant kidney 08/08/2019, 01/05/2017, 11/29/2016, 11/27/2016.    FINDINGS:  The renal transplant demonstrates no focal parenchymal abnormality, measures approximately 12.4 cm in length.  Minimal transplant hydronephrosis, improved from most recent prior dated 08/08/2019..  No peritransplant fluid.    Renal arterial resistive indices are as follows: Interlobar 0.82 (previously 0.65), segmental Up 0.85 (previously 0.56), segmental Mid 0.83 (previously 0.62), segmental Low 0.76 (previously 0.62).  There is no evidence of a tardus parvus waveform. The maximum velocity in the main renal artery is 169 cm/sec, previously 156 cm/seconds.  The renal artery to iliac ratio 0.8.    The renal transplant venous system is unremarkable.      Impression Elevated renal arterial resistive indices can be seen with rejection, calcineurin inhibitor toxicity, or medical renal disease.    Minimal transplant hydronephrosis, improved from most recent prior dated 08/08/2019.    This report was flagged in Epic as abnormal.    Electronically signed by resident: Luis Guzman  Date:    02/01/2020  Time:    05:57    Electronically signed by: Magdy Jaeger  Date:    02/01/2020  Time:    06:09

## 2020-02-01 NOTE — PLAN OF CARE
Pt ambulate. Remain free from fall and injuries. Continues to have elevated temperature Denies pain. Safety maintained.  Will monitor.

## 2020-02-01 NOTE — ASSESSMENT & PLAN NOTE
Encounter for Monitoring Immunosuppression post Transplant  Recent Labs   Lab 01/09/20  0323 01/31/20  2204 02/01/20  0835   Tacrolimus Lvl 8.1 2.1 L 3.3 L   -Target level for Alin is 4-6  -Recheck as per guidelines.  -Monitor for side effects and toxicities, given narrow therapeutic window and significant risk of AE

## 2020-02-01 NOTE — ASSESSMENT & PLAN NOTE
-DIVINE complicating renal transplant, likely from poor po intake and ?SIRS/sepsis   -CKD3 s/p kidney transplantation. Baseline creat 1.2-1.3. Continue to monitor renal function and electrolytes, HTN, secondary hyperparathyroidism and other issues related to underlying ESRD.    -Follow with strict I/Os, daily weights, BP (goal <140/90), daily labs.   -Avoid nephrotoxic agents (NSAIDs, IV contrast dye, ACEI/ARB anti-HTN medications, Aminoglycoside-containing antibiotics)  -Renally dose all appropriate medications, including antibiotics

## 2020-02-01 NOTE — CONSULTS
[facility]  Kidney Transplant  Consult Note    Inpatient consult to Kidney/Pancreas Transplant Medicine  Consult performed by: Jovanna Omalley MD  Consult ordered by: Randy Villagomez MD            Subjective:     History of Present Illness:   Alin is a 71 yo AAM post renal transplant 2016 who presented with feeling poorly, cough, chills, night sweats and sore throat. He reports sx started Monday and URI sx have gotten better, but he actually felt worse and started with HA. Found + rhinovirus, UA with pyuria suspicious for another UTI and GNR bacteremia.  Recently admitted for UTI and diverticulitis, and has completed atb for that. He follows with Dr. Mann for urethral stricture and stated he has had no LUTS symptoms since urethral dilation last .  IS: tacrolimus 3/ and  mg BID. MMF was held while on antibiotics and recently restarted.    Interval History:  Feels better since admission. He feels URI sx are further improved since admission. He has a residual cough with scant sputum. Denies SOB or CP. Chills and overall poor feeling improved. Denies LUTS whatsoever, foul odor, poor stream.    Past Medical and Surgical History: Mr. 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), Cancer, Celiac artery dissection, Chronic diarrhea, Chronic urethral stricture, Congenital absence of kidney, -donor kidney transplant 16, Dissecting aortic aneurysm (any part), abdominal, Diverticulosis, Encounter for blood transfusion, ESRD (end stage renal disease) (2010), H/O urethral stricture (2018), H/O: urethral stricture, History of AAA (abdominal aortic aneurysm) repair, History of urethral stricture (2018), Hypertension,  Hypokalemia, Hypothyroidism (1/10/2014), Inguinal hernia bilateral, non-recurrent, Kidney stones, Organ transplant candidate (11/26/2013), Plantar warts (1/10/2014), Recurrent UTI (7/28/2017), S/P kidney transplant, Secondary hyperparathyroidism, renal, and Thyroid disease.  He has a past surgical history that includes Gastrojejunostomy; Hernia repair; Hemorrhoid surgery; Aorta - superior mesenteric artery bypass graft; Percutaneous nephrolithotripsy; right ESWL (6/26/12); right  ESWL (10/31/12); Bladder neck reconstruction; Abdominal surgery; Bladder surgery; Cystoscopy; Lithotripsy; Kidney transplant; Colonoscopy (10/10/2013); Cystoscopy (N/A, 12/19/2018); Cystourethroscopy with direct vision internal urethrotomy (N/A, 12/19/2018); Dilation of urethra (N/A, 12/19/2018); Ablation (N/A, 8/22/2019); Left heart catheterization (Left, 8/20/2019); Urethroplasty using patch graft (N/A, 11/6/2019); and Flexible cystoscopy (N/A, 11/6/2019).    Past Social and Family History: Mr. Burkett reports that he quit smoking about 9 years ago. His smoking use included cigarettes. He has a 20.00 pack-year smoking history. He has never used smokeless tobacco. He reports that he drinks alcohol. He reports that he does not use drugs.His family history includes Alcohol abuse in his father; Alzheimer's disease in his mother; Cancer in his brother; Colon cancer in his brother; Diabetes in his mother; HIV in his brother; Kidney disease in his cousin and paternal uncle; No Known Problems in his brother, brother, daughter, sister, and sister; Stroke in his maternal aunt.    Intake/Output - Last 3 Shifts       01/30 0700 - 01/31 0659 01/31 0700 - 02/01 0659 02/01 0700 - 02/02 0659    P.O.  240     IV Piggyback  200     Total Intake(mL/kg)  440 (6.6)     Urine (mL/kg/hr)  650     Stool  0     Total Output  650     Net  -210            Urine Occurrence  0 x     Stool Occurrence  1 x            Review of Systems   Constitutional: Positive for  "activity change and fatigue.   HENT: Positive for sinus pressure.    Respiratory: Positive for cough. Negative for shortness of breath.    Cardiovascular: Negative for chest pain and leg swelling.   Gastrointestinal: Negative for nausea and vomiting.   Genitourinary: Negative for decreased urine volume, difficulty urinating, dysuria and hematuria.   Musculoskeletal: Negative for arthralgias.   Skin: Negative for rash.   Allergic/Immunologic: Positive for immunocompromised state.   Neurological: Negative for weakness.   Hematological: Does not bruise/bleed easily.   Psychiatric/Behavioral: Negative for sleep disturbance.     Objective:     Vital Signs (Most Recent):  Temp: 99.2 °F (37.3 °C) (02/01/20 1131)  Pulse: 78 (02/01/20 1131)  Resp: 16 (02/01/20 1131)  BP: 99/65 (02/01/20 1131)  SpO2: 96 % (02/01/20 1131) Vital Signs (24h Range):  Temp:  [98.2 °F (36.8 °C)-102.8 °F (39.3 °C)] 99.2 °F (37.3 °C)  Pulse:  [] 78  Resp:  [16-20] 16  SpO2:  [96 %-99 %] 96 %  BP: ()/(58-76) 99/65     Weight: 66.8 kg (147 lb 4.3 oz)  Height: 5' 11" (180.3 cm)  Body mass index is 20.54 kg/m².    Physical Exam   Constitutional: He is oriented to person, place, and time. He appears well-developed and well-nourished. He is cooperative. No distress.   HENT:   Mouth/Throat: Mucous membranes are normal. No oral lesions.   Eyes: Conjunctivae and lids are normal. No scleral icterus.   Neck: Trachea normal. Neck supple. No JVD present. No thyroid mass present.   Cardiovascular: Normal rate and regular rhythm.   Pulmonary/Chest: Effort normal. He has no rales.   Abdominal: Soft. He exhibits no mass. There is no hepatosplenomegaly. There is no tenderness. There is no CVA tenderness.   Musculoskeletal: Normal range of motion. He exhibits no edema.   Neurological: He is alert and oriented to person, place, and time. He displays no tremor. Gait normal.   Skin: Skin is warm and dry. No lesion and no rash noted. No cyanosis. Nails show no " clubbing.   Psychiatric: He has a normal mood and affect. His speech is normal. Cognition and memory are normal.     Significant Labs:  Recent Labs   Lab 01/31/20  1808 02/01/20  0226    138   K 3.8 4.0    107   CO2 22* 20*   BUN 21 22   CREATININE 2.0* 1.9*   CALCIUM 9.8 8.8   PHOS  --  2.6*     Diagnostics:  US - Kidney:   Results for orders placed during the hospital encounter of 01/31/20   US Transplant Kidney With Doppler    Narrative EXAMINATION:  US TRANSPLANT KIDNEY WITH DOPPLER    CLINICAL HISTORY:  h/o KTx now with DIVINE, h/o obstruction;    TECHNIQUE:  Transplant renal ultrasound of the right lower quadrant with color flow doppler and duplex analysis.    COMPARISON:  Ultrasound transplant kidney 08/08/2019, 01/05/2017, 11/29/2016, 11/27/2016.    FINDINGS:  The renal transplant demonstrates no focal parenchymal abnormality, measures approximately 12.4 cm in length.  Minimal transplant hydronephrosis, improved from most recent prior dated 08/08/2019..  No peritransplant fluid.    Renal arterial resistive indices are as follows: Interlobar 0.82 (previously 0.65), segmental Up 0.85 (previously 0.56), segmental Mid 0.83 (previously 0.62), segmental Low 0.76 (previously 0.62).  There is no evidence of a tardus parvus waveform. The maximum velocity in the main renal artery is 169 cm/sec, previously 156 cm/seconds.  The renal artery to iliac ratio 0.8.    The renal transplant venous system is unremarkable.      Impression Elevated renal arterial resistive indices can be seen with rejection, calcineurin inhibitor toxicity, or medical renal disease.    Minimal transplant hydronephrosis, improved from most recent prior dated 08/08/2019.    This report was flagged in Epic as abnormal.    Electronically signed by resident: Luis Guzman  Date:    02/01/2020  Time:    05:57    Electronically signed by: Magdy Jaeger  Date:    02/01/2020  Time:    06:09     Assessment/Plan:     * Acute kidney injury  superimposed on chronic kidney disease  -DIVINE complicating renal transplant, likely from poor po intake and ?SIRS/sepsis   -CKD3 s/p kidney transplantation. Baseline creat 1.2-1.3. Continue to monitor renal function and electrolytes, HTN, secondary hyperparathyroidism and other issues related to underlying ESRD.    -Follow with strict I/Os, daily weights, BP (goal <140/90), daily labs.   -Avoid nephrotoxic agents (NSAIDs, IV contrast dye, ACEI/ARB anti-HTN medications, Aminoglycoside-containing antibiotics)  -Renally dose all appropriate medications, including antibiotics       Stage 3 chronic kidney disease  See DIVINE      Bacteremia  GNR found today. Atb and management per hosp med.       -donor kidney transplant  -Stable presently; see CKD  -PHS-IR DDKT induced w [place]. KDPI 36%. HD since 2010    Immunosuppressive management encounter following kidney transplant  Encounter for Monitoring Immunosuppression post Transplant  Recent Labs   Lab 20  0323 20  2204 20  0835   Tacrolimus Lvl 8.1 2.1 L 3.3 L   -Target level for Alin is 4-6  -Recheck as per guidelines.  -Monitor for side effects and toxicities, given narrow therapeutic window and significant risk of AE         Jovanna Omalley MD  Kidney Transplant  [facility]

## 2020-02-01 NOTE — PROGRESS NOTES
Therapy with vancomycin complete and consult discontinued by provider.  Pharmacy will sign off, please re-consult as needed.

## 2020-02-01 NOTE — ED PROVIDER NOTES
Encounter Date: 2020       History     Chief Complaint   Patient presents with    Generalized Body Aches     Patient reports generalized body aches with chills x four days.     Chills     70-year-old male with past medical history of kidney transplant 2016, presenting with several days of cough and fatigue, also endorsed mild headache and subjective fever for which he took Excedrin with mild improvement.  Endorses increased urine output due to thirst, but also endorses decreased appetite.  Denies chest pain, neck or back pain, abdominal pain, changes in stooling.        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  Renal allograft biopsy 17 (DIVINE): 21 glomeruli, none globally sclerosed, <5% interstitial fibrosis, no ACR, c4d negative, AVR CCT Type 2 (V1 lesion); plan THYMO     Dissecting aortic aneurysm (any part), abdominal     Diverticulosis     Encounter for blood transfusion     ESRD (end stage renal disease) 2010    H/O urethral stricture 2018    H/O: urethral stricture     History of AAA (abdominal aortic aneurysm) repair     History of urethral stricture 2018    Hypertension     Hypokalemia     Hypothyroidism 1/10/2014    Inguinal hernia bilateral, non-recurrent     Kidney stones     Organ transplant candidate 2013    Plantar warts 1/10/2014    Recurrent  UTI 7/28/2017    S/P kidney transplant     Secondary hyperparathyroidism, renal     Thyroid disease      Past Surgical History:   Procedure Laterality Date    ABDOMINAL SURGERY      exploratory lapatomy x 2    ABLATION N/A 8/22/2019    Procedure: ABLATION, SVT;  Surgeon: Emerson Mcmanus MD;  Location: [facility] EP LAB;  Service: Cardiology;  Laterality: N/A;  SVT, RFA, CARTO, anes, GP, 323    AORTA - SUPERIOR MESENTERIC ARTERY BYPASS GRAFT      BLADDER NECK RECONSTRUCTION      BLADDER SURGERY      COLONOSCOPY  10/10/2013    Dr. Gutierrez, repeat in 5 years    CYSTOSCOPY      CYSTOSCOPY N/A 12/19/2018    Procedure: CYSTOSCOPY;  Surgeon: Dewey Mann MD;  Location: [address];  Service: Urology;  Laterality: N/A;  45 min    CYSTOURETHROSCOPY WITH DIRECT VISION INTERNAL URETHROTOMY N/A 12/19/2018    Procedure: CYSTOSCOPY, WITH DIRECT VISION INTERNAL URETHROTOMY;  Surgeon: Dewey Mann MD;  Location: [address];  Service: Urology;  Laterality: N/A;    DILATION OF URETHRA N/A 12/19/2018    Procedure: DILATION, URETHRA;  Surgeon: Dewey Mann MD;  Location: [facility] OR [address] [place] [address];  Service: Urology;  Laterality: N/A;    FLEXIBLE CYSTOSCOPY N/A 11/6/2019    Procedure: CYSTOSCOPY, FLEXIBLE;  Surgeon: Dewey Mann MD;  Location: [facility] OR [address] [place] [address];  Service: Urology;  Laterality: N/A;    GASTROJEJUNOSTOMY      HEMORRHOID SURGERY      HERNIA REPAIR      KIDNEY TRANSPLANT      LEFT HEART CATHETERIZATION Left 8/20/2019    Procedure: Left heart cath;  Surgeon: Javan Oscar MD;  Location: [facility] CATH/EP LAB;  Service: Cardiology;  Laterality: Left;    LITHOTRIPSY      PERCUTANEOUS NEPHROLITHOTRIPSY      right  ESWL  10/31/12    right ESWL  6/26/12    URETHROPLASTY USING PATCH GRAFT N/A 11/6/2019    Procedure: URETHROPLASTY, USING PATCH GRAFT BUCCAL MUCOSA GRAFT;  Surgeon: Dewey Mann MD;  Location: [facility] OR [address] [place] [address];  Service: Urology;  Laterality: N/A;  3 HOURS     Family History   Problem Relation  Age of Onset    Diabetes Mother     Alzheimer's disease Mother     Alcohol abuse Father     HIV Brother     Stroke Maternal Aunt     Kidney disease Paternal Uncle     Kidney disease Cousin     No Known Problems Sister     No Known Problems Daughter     No Known Problems Sister     No Known Problems Brother     No Known Problems Brother     Cancer Brother         thyroid cancer    Colon cancer Brother     Melanoma Neg Hx     Psoriasis Neg Hx     Lupus Neg Hx     Eczema Neg Hx     Colon polyps Neg Hx     Crohn's disease Neg Hx     Ulcerative colitis Neg Hx     Celiac disease Neg Hx      Social History     Tobacco Use    Smoking status: Former Smoker     Packs/day: 0.50     Years: 40.00     Pack years: 20.00     Types: Cigarettes     Last attempt to quit: 2010     Years since quittin.6    Smokeless tobacco: Never Used   Substance Use Topics    Alcohol use: Yes     Comment: occasional/social    Drug use: No     Comment: THC in youth     Review of Systems   Constitutional: Positive for appetite change, fatigue and fever. Negative for chills and diaphoresis.   Eyes: Negative for visual disturbance.        Neg vision changes   Respiratory: Negative for cough and shortness of breath.    Cardiovascular: Negative for chest pain and leg swelling.   Gastrointestinal: Negative for abdominal pain, nausea and vomiting.        Neg changes in stool   Genitourinary:        Neg changes in urination   Musculoskeletal: Negative for arthralgias and joint swelling.   Skin: Negative for rash.   Allergic/Immunologic: Positive for immunocompromised state.   Neurological: Positive for weakness and headaches. Negative for dizziness, light-headedness and numbness.   Hematological: Does not bruise/bleed easily.       Physical Exam     Initial Vitals [20 1719]   BP Pulse Resp Temp SpO2   107/76 (!) 116 20 (!) 102.8 °F (39.3 °C) 99 %      MAP       --         Physical Exam    Nursing note and vitals  reviewed.  Constitutional: He appears well-developed and well-nourished. He is not diaphoretic. No distress.   Globally weakened, febrile to 102.8   HENT:   Head: Normocephalic and atraumatic.   Nose: Nose normal.   Eyes: Conjunctivae and EOM are normal. Pupils are equal, round, and reactive to light. No scleral icterus.   Neck: Normal range of motion. Neck supple.   Cardiovascular: Regular rhythm and intact distal pulses.   No murmur heard.  Tachycardic to 116, regular   Pulmonary/Chest: Breath sounds normal. No respiratory distress. He has no wheezes. He has no rhonchi. He has no rales. He exhibits no tenderness.   Abdominal: Soft. Bowel sounds are normal. He exhibits no distension. There is no tenderness. There is no rebound and no guarding.   Musculoskeletal: Normal range of motion. He exhibits no edema or tenderness.   Neurological: He is alert and oriented to person, place, and time. He has normal strength. No cranial nerve deficit or sensory deficit.   Skin: Skin is warm and dry. Capillary refill takes less than 2 seconds. No rash noted.   Psychiatric: He has a normal mood and affect. His behavior is normal. Judgment and thought content normal.         ED Course   Critical Care  Date/Time: 1/31/2020 9:00 PM  Performed by: Estefanía Ruvalcaba MD  Authorized by: Estefanía Ruvalcaba MD   Direct patient critical care time: 15 minutes  Additional history critical care time: 10 minutes  Ordering / reviewing critical care time: 10 minutes  Documentation critical care time: 10 minutes  Consulting other physicians critical care time: 5 minutes  Total critical care time (exclusive of procedural time) : 50 minutes  Critical care time was exclusive of separately billable procedures and treating other patients and teaching time.  Critical care was necessary to treat or prevent imminent or life-threatening deterioration of the following conditions: sepsis.  Critical care was time spent personally by me on the following  activities: development of treatment plan with patient or surrogate, evaluation of patient's response to treatment, examination of patient, obtaining history from patient or surrogate, ordering and performing treatments and interventions, ordering and review of laboratory studies, ordering and review of radiographic studies, pulse oximetry, re-evaluation of patient's condition and review of old charts.        Labs Reviewed   CBC W/ AUTO DIFFERENTIAL - Abnormal; Notable for the following components:       Result Value    RBC 3.94 (*)     Hemoglobin 11.9 (*)     Hematocrit 37.9 (*)     Mean Corpuscular Hemoglobin Conc 31.4 (*)     RDW 14.7 (*)     Platelets 117 (*)     Lymph # 0.3 (*)     Gran% 85.1 (*)     Lymph% 4.5 (*)     All other components within normal limits   COMPREHENSIVE METABOLIC PANEL - Abnormal; Notable for the following components:    CO2 22 (*)     Creatinine 2.0 (*)     Albumin 3.3 (*)     Total Bilirubin 1.2 (*)     eGFR if  38.2 (*)     eGFR if non  33.1 (*)     All other components within normal limits   PROCALCITONIN - Abnormal; Notable for the following components:    Procalcitonin 1.78 (*)     All other components within normal limits   TROPONIN I - Abnormal; Notable for the following components:    Troponin I 0.040 (*)     All other components within normal limits   INFLUENZA A & B BY MOLECULAR   CULTURE, URINE   APTT   LACTIC ACID, PLASMA   LACTIC ACID, PLASMA   PROTIME-INR     EKG Readings: (Independently Interpreted)   Initial Reading: No STEMI.   Sinus tachycardia, LVH, rate 105, NV interval 178, QRS 86, .  T-wave inversion in lateral leads present on prior EKG October 2019.     ECG Results          EKG 12-lead (Final result)  Result time 02/01/20 11:35:20    Final result by Interface, Lab In [facility] (02/01/20 11:35:20)                 Narrative:    Test Reason : R00.0,    Vent. Rate : 105 BPM     Atrial Rate : 105 BPM     P-R Int : 178 ms           QRS Dur : 086 ms      QT Int : 308 ms       P-R-T Axes : 068 066 122 degrees     QTc Int : 407 ms    Age and gender specific analysis  Sinus tachycardia  LVH with repolarization abnormality  Abnormal ECG  When compared with ECG of 30-OCT-2019 14:37,  OR interval has decreased  Vent. rate has increased BY  38 BPM  Nonspecific T wave abnormality, worse in Inferior leads  Confirmed by Isiah JACKSON, Rudy ([id number]) on 2/1/2020 11:35:11 AM    Referred By: [email or web address]   SELF           Confirmed By:Rudy Joyce MD                            Imaging Results          X-Ray Chest AP Portable (Final result)  Result time 01/31/20 19:11:33    Final result by Rc Olmos MD (01/31/20 19:11:33)                 Impression:      No acute abnormality.      Electronically signed by: Rc Olmos  Date:    01/31/2020  Time:    19:11             Narrative:    EXAMINATION:  XR CHEST AP PORTABLE    CLINICAL HISTORY:  Sepsis;    TECHNIQUE:  Single frontal view of the chest was performed.    COMPARISON:  08/20/2019    FINDINGS:  The lungs are clear, with normal appearance of pulmonary vasculature and no pleural effusion or pneumothorax.    The cardiac silhouette is normal in size. The hilar and mediastinal contours are unremarkable.    Bones are intact.    Vascular stent in the left subclavian and brachial vasculature.    No significant change.                                 Medical Decision Making:   History:   I obtained history from: someone other than patient.       <> Summary of History: Wife reports pt had cough for several days, she is not sick herself  Old Medical Records: I decided to obtain old medical records.  Old Records Summarized: records from clinic visits and records from previous admission(s).       <> Summary of Records: Last admission 1/10/20 for rectal bleed  Initial Assessment:   Patient is febrile and tachycardic and immunocompromised, will cover with broad-spectrum antibiotics as well as test for influenza under sepsis  protocol.  Differential Diagnosis:   PNA, pleural effusion, influenza, URI, sepsis, bacteremia,   ED Management:  Flagyl administered by accidental order, will add cefepime due to likely pulmonary etiology and to cover broad-spectrum in conjunction with vancomycin.    H/H slightly anemic at baseline, platelets 117, AKA eye with creatinine elevated to 2.0 compared to 1.2 three weeks ago, T bili newly elevated to 1.2, influenza negative.                                 Clinical Impression:       ICD-10-CM ICD-9-CM   1. Sepsis, due to unspecified organism, unspecified whether acute organ dysfunction present A41.9 038.9     995.91   2. Tachycardia R00.0 785.0   3. Total bilirubin, elevated R17 277.4   4. Acute kidney injury N17.9 584.9                           Estefanía Ruvalcaba MD  02/02/20 [id number]

## 2020-02-02 PROBLEM — J06.9 VIRAL URI WITH COUGH: Status: ACTIVE | Noted: 2020-01-01

## 2020-02-02 NOTE — PROGRESS NOTES
"TRANSPLANT DAILY PROGRESS NOTE  Alin is a 71 yo AAM post renal transplant 2016 who presented with feeling poorly, cough, chills, night sweats and sore throat. He reports sx started Monday and URI sx have gotten better, but he actually felt worse and started with HA. Found + rhinovirus, UA with pyuria suspicious for another UTI and GNR bacteremia.  Recently admitted for UTI and diverticulitis, and has completed atb for that. He follows with Dr. Mann for urethral stricture and stated he has had no LUTS symptoms since urethral dilation last fall.  Home IS: tacrolimus 3/2 and usually on  mg BID. MMF was held while on antibiotics and recently restarted.  Asked to follow patient for monitoring of transplant relates issues, allograft function, and immunosuppression.    Interval history: Feels much better since admission. Denies fever, anorxia, N/V, CP, SOB, abd pain. Denies pain over allograft and LUTS. Urine cx + presumptive E Coli, Blood cx GNR, pending further ID and sensitivities.    Past medical, surgical, family, social history reviewed & unchanged since admission.     I/O last 3 completed shifts:  In: 1810 [P.O.:1310; IV Piggyback:500]  Out: 2375 [Urine:2375]    VITALS:  height is 5' 11" (1.803 m) and weight is 66.8 kg (147 lb 4.3 oz). His oral temperature is 98.9 °F (37.2 °C). His blood pressure is 125/82 and his pulse is 68. His respiration is 18 and oxygen saturation is 98%.       A&O x3, NAD.  Lungs CTA, unlabored.  Heart regular, no rub.  Abdomen soft, nontender, no masses.  Edema: none in either LE.    Recent Labs   Lab 01/31/20  1808 02/01/20  0226 02/02/20  0400   WBC 7.49 5.62 5.57   HGB 11.9* 10.9* 10.2*   HCT 37.9* 35.0* 33.8*   * 89* 113*       Recent Labs   Lab 01/31/20  1808 02/01/20  0226 02/02/20  0400    138 141   K 3.8 4.0 3.3*    107 111*   CO2 22* 20* 23   BUN 21 22 21   CREATININE 2.0* 1.9* 1.9*   CALCIUM 9.8 8.8 8.8   PHOS  --  2.6* 2.1*         ASSESSMENT/PLAN:    DIVINE " on CKD III post kidney transplant  Recent Labs   Lab 01/31/20  1808 02/01/20  0226 02/02/20  0400   Creatinine 2.0 H 1.9 H 1.9 H   eGFR if non  33.1 A 35.2 A 35.2 A   eGFR if  38.2 A 40.7 A 40.7 A   - Usually has CKD3 s/p kidney transplantation. Baseline creat 1.2-1.3.   - DIVINE suspected now from pyelonephritis; stable. No need RRT  -Follow with strict I/Os, daily weights, BP (goal <140/90), daily labs.    -Avoid nephrotoxic agents when feasible (NSAIDs, IV contrast dye, Aminoglycoside-containing antibiotics)  -Renally dose all appropriate medications, including antibiotics      Encounter for Monitoring Immunosuppression post Transplant  Recent Labs   Lab 01/31/20  2204 02/01/20  0835 02/02/20  0400   Tacrolimus Lvl 2.1 L 3.3 L 4.9 L   -Target level for Alin is 4-6; watch w/o change  -Will trend immunosuppression levels daily. Also monitor for med-related side effects or drug toxicity given immunosuppressant med with narrow therapeutic window.   -Cont to hold MMF     UTI/pyelonephritis and bacteremia per hosp medicine

## 2020-02-02 NOTE — SUBJECTIVE & OBJECTIVE
Interval History: NAEON. Cough improved. Ambulating without dyspnea.     Review of Systems   Constitutional: Negative for chills and fever.   HENT: Negative for congestion and trouble swallowing.    Eyes: Negative for visual disturbance.   Respiratory: Positive for cough (improved). Negative for shortness of breath and wheezing.    Cardiovascular: Negative for chest pain, palpitations and leg swelling.   Gastrointestinal: Negative for abdominal pain, nausea and vomiting.   Genitourinary: Positive for frequency. Negative for decreased urine volume, difficulty urinating, dysuria, flank pain and hematuria.   Musculoskeletal: Negative for arthralgias and myalgias.   Skin: Negative for rash.   Neurological: Negative for dizziness, light-headedness and numbness.   Psychiatric/Behavioral: Negative for agitation and confusion.     Objective:     Vital Signs (Most Recent):  Temp: 98.9 °F (37.2 °C) (02/02/20 1146)  Pulse: 68 (02/02/20 1146)  Resp: 18 (02/02/20 1146)  BP: 125/82 (02/02/20 1146)  SpO2: 98 % (02/02/20 1146) Vital Signs (24h Range):  Temp:  [97.8 °F (36.6 °C)-98.9 °F (37.2 °C)] 98.9 °F (37.2 °C)  Pulse:  [68-96] 68  Resp:  [17-20] 18  SpO2:  [96 %-99 %] 98 %  BP: (102-138)/(61-85) 125/82     Weight: 66.8 kg (147 lb 4.3 oz)  Body mass index is 20.54 kg/m².    Intake/Output Summary (Last 24 hours) at 2/2/2020 1309  Last data filed at 2/2/2020 0645  Gross per 24 hour   Intake 790 ml   Output 1050 ml   Net -260 ml      Physical Exam   Constitutional: He is oriented to person, place, and time. No distress.   HENT:   Head: Normocephalic and atraumatic.   Eyes: Right eye exhibits no discharge. Left eye exhibits no discharge. No scleral icterus.   Neck: Normal range of motion. No JVD present.   Cardiovascular: Normal rate and regular rhythm.   Pulmonary/Chest: Effort normal and breath sounds normal.   Abdominal: Soft. He exhibits no distension.   Musculoskeletal: He exhibits no edema or tenderness.   Neurological: He is  alert and oriented to person, place, and time.   Skin: Skin is warm and dry. He is not diaphoretic.   Vitals reviewed.    Significant Labs:   Blood Culture:   Recent Labs   Lab 01/31/20 1809 02/01/20  1749   LABBLOO Gram stain eleonora bottle: Gram negative rods   Results called to and read back by:Berkley Salazar RN  02/01/2020    08:52  Gram stain aer bottle: Gram negative rods   Positive results previously called 02/01/2020  15:54  ESCHERICHIA COLI  Susceptibility pending  *  No Growth to date  No Growth to date No Growth to date  No Growth to date     CBC:   Recent Labs   Lab 01/31/20 1808 02/01/20 0226 02/02/20  0400   WBC 7.49 5.62 5.57   HGB 11.9* 10.9* 10.2*   HCT 37.9* 35.0* 33.8*   * 89* 113*     CMP:   Recent Labs   Lab 01/31/20 1808 02/01/20 0226 02/02/20  0400    138 141   K 3.8 4.0 3.3*    107 111*   CO2 22* 20* 23    95 116*   BUN 21 22 21   CREATININE 2.0* 1.9* 1.9*   CALCIUM 9.8 8.8 8.8   PROT 7.2 6.5 6.3   ALBUMIN 3.3* 2.9* 2.7*   BILITOT 1.2* 0.9 0.3   ALKPHOS 76 71 75   AST 19 20 17   ALT 17 17 16   ANIONGAP 11 11 7*   EGFRNONAA 33.1* 35.2* 35.2*     Urine Culture:   Recent Labs   Lab 01/31/20 2152   LABURIN PRESUMPTIVE E COLI  >100,000 cfu/ml  Identification and susceptibility pending  *     Urine Studies:   Recent Labs   Lab 01/31/20 2152 02/01/20  1504   COLORU Yellow Yellow   APPEARANCEUA Cloudy* Hazy*   PHUR 6.0 6.0   SPECGRAV 1.010 1.010   PROTEINUA 1+* Negative   GLUCUA Negative Negative   KETONESU Negative Negative   BILIRUBINUA Negative Negative   OCCULTUA 2+* 2+*   NITRITE Negative Negative   LEUKOCYTESUR 3+* 3+*   RBCUA 85* 6*   WBCUA >100* 84*   BACTERIA Occasional Occasional   SQUAMEPITHEL  --  0   HYALINECASTS 0  --      All pertinent labs within the past 24 hours have been reviewed.    Significant Imaging: I have reviewed all pertinent imaging results/findings within the past 24 hours.   Transplant Kidney ultrasound w/ doppler:  Impression        Elevated renal arterial resistive indices can be seen with rejection, calcineurin inhibitor toxicity, or medical renal disease.    Minimal transplant hydronephrosis, improved from most recent prior dated 08/08/2019.    This report was flagged in Epic as abnormal.    Electronically signed by resident: Luis Guzman  Date: 02/01/2020  Time: 05:57    Electronically signed by: Magdy Jaeger  Date: 02/01/2020  Time: 06:09     CXR:  FINDINGS:  The lungs are clear, with normal appearance of pulmonary vasculature and no pleural effusion or pneumothorax.    The cardiac silhouette is normal in size. The hilar and mediastinal contours are unremarkable.    Bones are intact.    Vascular stent in the left subclavian and brachial vasculature.    No significant change.      Impression       No acute abnormality.      Electronically signed by: Rc Morin  Date: 01/31/2020  Time: 19:11

## 2020-02-02 NOTE — ASSESSMENT & PLAN NOTE
71 yo M presenting with respiratory symptoms + urinary frequency that is admitted with DIVINE on CKD of a transplanted kidney. 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 S/p kidney transplant in 2016. On tacrolimus (3mg in am, 2mg in pm) + MMF, but he has not taken medication over last few days. DDx includes UTI (UA suggestive of infection), post-renal obstruction (BPH vs urethral stricture), pre-renal azotemia 2/2 dehydration in setting of viral illness, graft dysfunction due to missing recent doses of tacro (less likely).    Kidney transplant ultrasound: increased resistive indices; minimal transplant hydronephrosis improved from prior    Plan:  - Blood and urine cultures growing E. Coli; susceptibilities pending  - Continue zosyn  - Holding MMF while bacteremic  - Continue tacrolimus at home dose; therapeutic range 4-6 per KTM  - Continue prednisone 5mg    - Trend Cr  - Strict I&Os  - Avoid nephrotoxic agents  - Renally adjust medications

## 2020-02-02 NOTE — PLAN OF CARE
Pt ambulate. Remain free from fall and injuries. VSS, no spike on temperature this shift.  Denies pain. Safety maintained.  Will monitor.

## 2020-02-02 NOTE — PROGRESS NOTES
[facility] Hospital Medicine  Progress Note    Patient Name: Alin Burkett  MRN: [id number]  Patient Class: IP- Inpatient   Admission Date: 1/31/2020  Length of Stay: 2 days  Attending Physician: Dameon Anderson MD  Primary Care Provider: Carmen Krueger MD    [facility] Medicine Team: [facility]OSP MED 1 Kae Medellin MD    Subjective:     Principal Problem:Acute kidney injury superimposed on chronic kidney disease        HPI:  Mr. Burkett is a 70 year old male with ESRD s/p KTx (2016), BPH, urethral stricture s/p stenting, recurrent UTIs (Hx ESBL, VRE), AAA s/p repair, HTN, diverticulosis, and hypothyroidism that presents with productive cough, chills, sore throat, night sweats. Wife at bedside during H/P.    Patient was admitted earlier this month for acute diverticulitis (see Hospital Course below). Course was complicated by UTI that grew pan-sensitive Pseudomonas. He completed cipro x 10 days + flagyl x 7 days. He recovered fully and was back to his baseline until 1/27 when he developed sore throat. Throughout the day, he developed productive cough (swallows sputum so unsure what color). Symptoms have been progressively worse and associated with frontal headaches, chills, and occasional night sweats. He has taken excedrin for headaches with moderate improvement. He has been staying well hydrated but has had decreased appetite. Has had urinary frequency as well. Suffers from frequent UTIs that typical manifest as urethral burning prior to urination. Denies subjective fevers, nausea/vomiting, abdominal pain, dysuria, flank pain. Has diarrhea at baseline without change. Of note, he has missed last 3 doses of tacrolimus (typically takes 3mg in am, 2mg in pm).    Hospital Course from 1/6-1/9:  Patient with kidney transplant and on chronic immunosuppression was admitted for 2 episodes of painless blood per rectum with associated LLQ pain on palpation at home. On arrival he was noted to have positive fecal  occult and with unknown last colonoscopy. He noted no fever chills, nausea, vomiting, confusion, LH, hemoptysis, hematemesis, chest pain, sob, hematuria, dysuria. Over hospital course, he had one additional episode but with stable hb and no leukocytosis. CT without contrast demonstrated diverticulosis with possible diverticulitis. GI consulted and said no intervention at this time was indicated but will follow up outpatient. Patient was started on cipro and flagyll. KTM consulted and recommended continuing patients home immunosuppression regimen. They also noted that patient should have a CT with contrast and 48 hours of IVF thereafter to avoid DIVINE given his kidney transplant history so that the patient can CT with contrast was suspicious for diverticulitis. Of note, he was found to have >100,000 CFU of gram negative bacteria on UCx following a dirty UA. KTM recommended waiting for speciation and sensitivities prior to discharging patient given his immunosuppressed state. Cultures with Pseudomonas sensitive to Ciprofloxacin. Patient to complete 10 day course of Ciprofloxacin and 7 day course of Metronidazole. Patient to resume cellcept after antibiotic completion.       Overview/Hospital Course:  Admitted to Hospital Medicine for evaluation/management of productive cough, sore throat, fevers. Found to have DIVINE and positive UA for many bacteria, in the setting of multiple prior UTIs and urethral stricture. RIP is positive for Rhinovirus/Enterovirus. CXR clear. No leukocytosis. BCx positive for E. Coli. Being treated for DIVINE 2/2 urosepsis.     Evaluated by Kidney Transplant Medicine, tacro level in therapeutic window of 4-6. Holding home MMF.     Treating E. Coli bacteremia with pip-tazo, de-escalate pending sensitivities. Will schedule Urology outpatient follow-up prior to discharge.     Interval History: NAEON. Cough improved. Ambulating without dyspnea.     Review of Systems   Constitutional: Negative for chills  and fever.   HENT: Negative for congestion and trouble swallowing.    Eyes: Negative for visual disturbance.   Respiratory: Positive for cough (improved). Negative for shortness of breath and wheezing.    Cardiovascular: Negative for chest pain, palpitations and leg swelling.   Gastrointestinal: Negative for abdominal pain, nausea and vomiting.   Genitourinary: Positive for frequency. Negative for decreased urine volume, difficulty urinating, dysuria, flank pain and hematuria.   Musculoskeletal: Negative for arthralgias and myalgias.   Skin: Negative for rash.   Neurological: Negative for dizziness, light-headedness and numbness.   Psychiatric/Behavioral: Negative for agitation and confusion.     Objective:     Vital Signs (Most Recent):  Temp: 98.9 °F (37.2 °C) (02/02/20 1146)  Pulse: 68 (02/02/20 1146)  Resp: 18 (02/02/20 1146)  BP: 125/82 (02/02/20 1146)  SpO2: 98 % (02/02/20 1146) Vital Signs (24h Range):  Temp:  [97.8 °F (36.6 °C)-98.9 °F (37.2 °C)] 98.9 °F (37.2 °C)  Pulse:  [68-96] 68  Resp:  [17-20] 18  SpO2:  [96 %-99 %] 98 %  BP: (102-138)/(61-85) 125/82     Weight: 66.8 kg (147 lb 4.3 oz)  Body mass index is 20.54 kg/m².    Intake/Output Summary (Last 24 hours) at 2/2/2020 1309  Last data filed at 2/2/2020 0645  Gross per 24 hour   Intake 790 ml   Output 1050 ml   Net -260 ml      Physical Exam   Constitutional: He is oriented to person, place, and time. No distress.   HENT:   Head: Normocephalic and atraumatic.   Eyes: Right eye exhibits no discharge. Left eye exhibits no discharge. No scleral icterus.   Neck: Normal range of motion. No JVD present.   Cardiovascular: Normal rate and regular rhythm.   Pulmonary/Chest: Effort normal and breath sounds normal.   Abdominal: Soft. He exhibits no distension.   Musculoskeletal: He exhibits no edema or tenderness.   Neurological: He is alert and oriented to person, place, and time.   Skin: Skin is warm and dry. He is not diaphoretic.   Vitals  reviewed.    Significant Labs:   Blood Culture:   Recent Labs   Lab 01/31/20  1809 02/01/20  1749   LABBLOO Gram stain eleonora bottle: Gram negative rods   Results called to and read back by:Berkley Salazar RN  02/01/2020    08:52  Gram stain aer bottle: Gram negative rods   Positive results previously called 02/01/2020  15:54  ESCHERICHIA COLI  Susceptibility pending  *  No Growth to date  No Growth to date No Growth to date  No Growth to date     CBC:   Recent Labs   Lab 01/31/20 1808 02/01/20 0226 02/02/20  0400   WBC 7.49 5.62 5.57   HGB 11.9* 10.9* 10.2*   HCT 37.9* 35.0* 33.8*   * 89* 113*     CMP:   Recent Labs   Lab 01/31/20 1808 02/01/20 0226 02/02/20  0400    138 141   K 3.8 4.0 3.3*    107 111*   CO2 22* 20* 23    95 116*   BUN 21 22 21   CREATININE 2.0* 1.9* 1.9*   CALCIUM 9.8 8.8 8.8   PROT 7.2 6.5 6.3   ALBUMIN 3.3* 2.9* 2.7*   BILITOT 1.2* 0.9 0.3   ALKPHOS 76 71 75   AST 19 20 17   ALT 17 17 16   ANIONGAP 11 11 7*   EGFRNONAA 33.1* 35.2* 35.2*     Urine Culture:   Recent Labs   Lab 01/31/20 2152   LABURIN PRESUMPTIVE E COLI  >100,000 cfu/ml  Identification and susceptibility pending  *     Urine Studies:   Recent Labs   Lab 01/31/20 2152 02/01/20  1504   COLORU Yellow Yellow   APPEARANCEUA Cloudy* Hazy*   PHUR 6.0 6.0   SPECGRAV 1.010 1.010   PROTEINUA 1+* Negative   GLUCUA Negative Negative   KETONESU Negative Negative   BILIRUBINUA Negative Negative   OCCULTUA 2+* 2+*   NITRITE Negative Negative   LEUKOCYTESUR 3+* 3+*   RBCUA 85* 6*   WBCUA >100* 84*   BACTERIA Occasional Occasional   SQUAMEPITHEL  --  0   HYALINECASTS 0  --      All pertinent labs within the past 24 hours have been reviewed.    Significant Imaging: I have reviewed all pertinent imaging results/findings within the past 24 hours.   Transplant Kidney ultrasound w/ doppler:  Impression       Elevated renal arterial resistive indices can be seen with rejection, calcineurin inhibitor toxicity, or  medical renal disease.    Minimal transplant hydronephrosis, improved from most recent prior dated 08/08/2019.    This report was flagged in Epic as abnormal.    Electronically signed by resident: Luis Guzman  Date: 02/01/2020  Time: 05:57    Electronically signed by: Magdy Jaeger  Date: 02/01/2020  Time: 06:09     CXR:  FINDINGS:  The lungs are clear, with normal appearance of pulmonary vasculature and no pleural effusion or pneumothorax.    The cardiac silhouette is normal in size. The hilar and mediastinal contours are unremarkable.    Bones are intact.    Vascular stent in the left subclavian and brachial vasculature.    No significant change.      Impression       No acute abnormality.      Electronically signed by: Rc Morin  Date: 01/31/2020  Time: 19:11       Assessment/Plan:      * Acute kidney injury superimposed on chronic kidney disease  71 yo M presenting with respiratory symptoms + urinary frequency that is admitted with DIVINE on CKD of a transplanted kidney. 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 S/p kidney transplant in 2016. On tacrolimus (3mg in am, 2mg in pm) + MMF, but he has not taken medication over last few days. DDx includes UTI (UA suggestive of infection), post-renal obstruction (BPH vs urethral stricture), pre-renal azotemia 2/2 dehydration in setting of viral illness, graft dysfunction due to missing recent doses of tacro (less likely).    Kidney transplant ultrasound: increased resistive indices; minimal transplant hydronephrosis improved from prior    Plan:  - Blood and urine cultures growing E. Coli; susceptibilities pending  - Continue zosyn  - Holding MMF while bacteremic  - Continue tacrolimus at home dose; therapeutic range 4-6 per KTM  - Continue prednisone 5mg    - Trend Cr  - Strict I&Os  - Avoid nephrotoxic agents  - Renally adjust medications    Bacteremia  BCx and UCx both positive for E. Coli    Viral URI with cough  -  RIP panel positive for rhinovirus/enterovirus  - Continue supportive care    Complicated UTI (urinary tract infection)  Hx urethral stricture s/p urethral stenting in 2019  Suffers from recurrent UTIs, Hx ESBL Klebsiella + VRE  Most recent UTI grew pan-sensitive Pseudomonas earlier this month    UA with RBC 85, WBC > 100. BCx + UCx pending    Plan:  - F/u UCx  - Continue zosyn  - Renal US to evaluate for hydronephrosis    SVT (supraventricular tachycardia)  Continue home Lopressor      Recurrent UTI  Stricture of anterior urethra in male  -donor kidney transplant  BPH (benign prostatic hyperplasia)  - Outpatient f/u with Urologist Dr. aMnn     Stage 3 chronic kidney disease    Acquired hypothyroidism  Continue home levothyroxine      Anemia of chronic disease  CBCs daily  Transfuse with goal Hb > 7    Essential hypertension  Continue home Lopressor      VTE Risk Mitigation (From admission, onward)         Ordered     IP VTE LOW RISK PATIENT  Once      20                      Kae Medellin MD  Department of Hospital Medicine   [facility]

## 2020-02-03 NOTE — ASSESSMENT & PLAN NOTE
70M PMH KTx 2016 who presented w/ URI symptoms, fevers, found to have + UA, urine cx + E. Coli, blood cultures + E. Coli. Repeat blood cultures on 2/2 NGTD. Patient clinically improved, antibiotics have been de-escalated to ceftriaxone. ID consulted for discharge recs.    Recommendations:  - continue ceftriaxone 2g daily x 14 days from first negative blood culture (EOT 2/16/2020)  - check weekly CBC and CMP while on IV antibiotics - fax to [phone]    Will sign off. Please call back w/ questions.

## 2020-02-03 NOTE — ASSESSMENT & PLAN NOTE
*PHS-IR DDKT induced w [facility]. KDPI 36%.   Plan/Rec:  - Daily renal panel  - Baseline Scr ~1.2-1.3.  - Currently 1.7 from 1.9 yesterday  - Stable presently; see CKD

## 2020-02-03 NOTE — PLAN OF CARE
02/03/20 1111   Post-Acute Status   Post-Acute Authorization Home Health/Hospice   Home Health/Hospice Status Referrals Sent     Pt will need abx at d/c, possibly today, [facility] infusion given referral, Sw to follow.

## 2020-02-03 NOTE — SUBJECTIVE & OBJECTIVE
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  Renal allograft biopsy 17 (DIVINE): 21 glomeruli, none globally sclerosed, <5% interstitial fibrosis, no ACR, c4d negative, AVR CCT Type 2 (V1 lesion); plan THYMO     Dissecting aortic aneurysm (any part), abdominal     Diverticulosis     Encounter for blood transfusion     ESRD (end stage renal disease) 2010    H/O urethral stricture 2018    H/O: urethral stricture     History of AAA (abdominal aortic aneurysm) repair     History of urethral stricture 2018    Hypertension     Hypokalemia     Hypothyroidism 1/10/2014    Inguinal hernia bilateral, non-recurrent     Kidney stones     Organ transplant candidate 2013    Plantar warts 1/10/2014    Recurrent UTI 2017    S/P kidney transplant     Secondary hyperparathyroidism, renal     Thyroid disease        Past Surgical History:   Procedure Laterality Date    ABDOMINAL SURGERY      exploratory lapatomy x 2    ABLATION N/A 2019    Procedure: ABLATION, SVT;  Surgeon: Emerson Mcmanus MD;  Location: [facility];  Service: Cardiology;  Laterality: N/A;  SVT, RFA, CARTO, anes, GP, 323    AORTA - SUPERIOR MESENTERIC ARTERY BYPASS GRAFT      BLADDER NECK RECONSTRUCTION      BLADDER SURGERY      COLONOSCOPY  10/10/2013    Dr. Gutierrez, repeat in 5 years    CYSTOSCOPY      CYSTOSCOPY N/A 2018    Procedure: CYSTOSCOPY;   Surgeon: Dewey Mann MD;  Location: [facility] OR [address] [place] [address];  Service: Urology;  Laterality: N/A;  45 min    CYSTOURETHROSCOPY WITH DIRECT VISION INTERNAL URETHROTOMY N/A 12/19/2018    Procedure: CYSTOSCOPY, WITH DIRECT VISION INTERNAL URETHROTOMY;  Surgeon: Dewey Mann MD;  [address]  Service: Urology;  Laterality: N/A;    DILATION OF URETHRA N/A 12/19/2018    Procedure: DILATION, URETHRA;  Surgeon: Dewey Mann MD;  [facility];  Service: Urology;  Laterality: N/A;    FLEXIBLE CYSTOSCOPY N/A 11/6/2019    Procedure: CYSTOSCOPY, FLEXIBLE;  Surgeon: Dewey Mann MD;  [facility];  Service: Urology;  Laterality: N/A;    GASTROJEJUNOSTOMY      HEMORRHOID SURGERY      HERNIA REPAIR      KIDNEY TRANSPLANT      LEFT HEART CATHETERIZATION Left 8/20/2019    Procedure: Left heart cath;  Surgeon: Javan Oscar MD;  Location: [facility] CATH/EP LAB;  Service: Cardiology;  Laterality: Left;    LITHOTRIPSY      PERCUTANEOUS NEPHROLITHOTRIPSY      right  ESWL  10/31/12    right ESWL  6/26/12    URETHROPLASTY USING PATCH GRAFT N/A 11/6/2019    Procedure: URETHROPLASTY, USING PATCH GRAFT BUCCAL MUCOSA GRAFT;  Surgeon: Dewey Mann MD;  Location: [address];  Service: Urology;  Laterality: N/A;  3 HOURS       Review of patient's allergies indicates:  No Known Allergies    Medications:  Medications Prior to Admission   Medication Sig    aspirin (ECOTRIN) 81 MG EC tablet Take 1 tablet (81 mg total) by mouth once daily.    calcitRIOL (ROCALTROL) 0.5 MCG Cap Take 1 capsule (0.5 mcg total) by mouth once daily.    famotidine (PEPCID) 20 MG tablet Take 1 tablet (20 mg total) by mouth every evening.    ketoconazole (NIZORAL) 200 mg Tab Take 0.5 tablets (100 mg total) by mouth once daily.    levothyroxine (SYNTHROID) 100 MCG tablet Take 1 tablet (100 mcg total) by mouth once daily.    magnesium oxide (MAG-OX) 400 mg (241.3 mg magnesium) tablet Take 1 tablet (400 mg  total) by mouth once daily.    metoprolol tartrate (LOPRESSOR) 25 MG tablet Take 0.5 tablets (12.5 mg total) by mouth 2 (two) times daily.    multivitamin (ONE DAILY MULTIVITAMIN) per tablet Take 1 tablet by mouth once daily.    pep injection Inject 0.25 ml as directed.   May increase by 0.05 ml such as 0.30, 0.35 ml etc. Up to 1.00 ml Until adequate result is achieved.    For compounding pharmacy use:   Add PAPAVERINE 30 mcg  Add PHENTOLAMINE 10 mg  Add ALPROSTADIL 100 mcg    predniSONE (DELTASONE) 5 MG tablet Take 1 tablet (5 mg total) by mouth once daily. Z94.0/Kidney transplant on 11/26/2016    sodium bicarbonate 650 MG tablet Take 1 tablet (650 mg total) by mouth 2 (two) times daily.    tacrolimus (PROGRAF) 1 MG Cap Take 3 capsules (3 mg total) by mouth every morning AND 2 capsules (2 mg total) every evening. Z94.0/Kidney Transplant on 11/26/16.     Antibiotics (From admission, onward)    Start     Stop Route Frequency Ordered    02/03/20 1200  cefTRIAXone (ROCEPHIN) 2 g in dextrose 5 % 50 mL IVPB      -- IV Every 24 hours (non-standard times) 02/03/20 1000        Antifungals (From admission, onward)    Start     Stop Route Frequency Ordered    02/01/20 0900  ketoconazole split tablet 100 mg      -- Oral Daily 01/31/20 2154        Antivirals (From admission, onward)    None           Immunization History   Administered Date(s) Administered    Influenza - High Dose - PF (65 years and older) 11/29/2017, 10/03/2018    Influenza - Trivalent - Adjuvanted - PF 10/03/2018, 09/19/2019    Pneumococcal Conjugate - 13 Valent 10/03/2018, 09/19/2019    Tdap 08/24/2018       Family History     Problem Relation (Age of Onset)    Alcohol abuse Father    Alzheimer's disease Mother    Cancer Brother    Colon cancer Brother    Diabetes Mother    HIV Brother    Kidney disease Paternal Uncle, Cousin    No Known Problems Sister, Daughter, Sister, Brother, Brother    Stroke Maternal Aunt        Social History      Socioeconomic History    Marital status: Significant Other     Spouse name: Not on file    Number of children: Not on file    Years of education: Not on file    Highest education level: Not on file   Occupational History     Employer: Disabled   Social Needs    Financial resource strain: Not on file    Food insecurity:     Worry: Not on file     Inability: Not on file    Transportation needs:     Medical: Not on file     Non-medical: Not on file   Tobacco Use    Smoking status: Former Smoker     Packs/day: 0.50     Years: 40.00     Pack years: 20.00     Types: Cigarettes     Last attempt to quit: 2010     Years since quittin.6    Smokeless tobacco: Never Used   Substance and Sexual Activity    Alcohol use: Yes     Comment: occasional/social    Drug use: No     Comment: THC in youth    Sexual activity: Yes     Partners: Female     Birth control/protection: None   Lifestyle    Physical activity:     Days per week: Not on file     Minutes per session: Not on file    Stress: Not on file   Relationships    Social connections:     Talks on phone: Not on file     Gets together: Not on file     Attends Yazidism service: Not on file     Active member of club or organization: Not on file     Attends meetings of clubs or organizations: Not on file     Relationship status: Not on file   Other Topics Concern    Not on file   Social History Narrative    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   HENT: Negative for congestion, dental problem, ear pain and rhinorrhea.    Eyes: Negative for pain and discharge.   Respiratory: Negative for cough and shortness of breath.    Cardiovascular: Negative for chest pain and leg swelling.   Gastrointestinal: Negative for abdominal distention, abdominal pain, constipation, diarrhea, nausea and vomiting.   Genitourinary: Negative for difficulty urinating  and dysuria.   Musculoskeletal: Negative for arthralgias, back pain and joint swelling.   Skin: Negative for rash and wound.   Allergic/Immunologic: Positive for immunocompromised state.   Neurological: Negative for dizziness, light-headedness and headaches.   Psychiatric/Behavioral: Negative for behavioral problems and confusion.     Objective:     Vital Signs (Most Recent):  Temp: 98.2 °F (36.8 °C) (02/03/20 1559)  Pulse: 66 (02/03/20 1559)  Resp: 18 (02/03/20 1559)  BP: 133/82 (02/03/20 1559)  SpO2: 97 % (02/03/20 1559) Vital Signs (24h Range):  Temp:  [96 °F (35.6 °C)-98.5 °F (36.9 °C)] 98.2 °F (36.8 °C)  Pulse:  [63-71] 66  Resp:  [15-18] 18  SpO2:  [96 %-98 %] 97 %  BP: (123-155)/(75-95) 133/82     Weight: 66.8 kg (147 lb 4.3 oz)  Body mass index is 20.54 kg/m².    Estimated Creatinine Clearance: 38.7 mL/min (A) (based on SCr of 1.7 mg/dL (H)).    Physical Exam   Constitutional: He is oriented to person, place, and time. He appears well-developed and well-nourished. No distress.   HENT:   Head: Atraumatic.   Right Ear: External ear normal.   Left Ear: External ear normal.   Nose: Nose normal.   Mouth/Throat: Oropharynx is clear and moist.   Eyes: EOM are normal.   Neck: Normal range of motion. Neck supple.   Cardiovascular: Normal rate, regular rhythm and normal heart sounds.   No murmur heard.  Pulmonary/Chest: Effort normal and breath sounds normal. No respiratory distress. He has no wheezes.   Abdominal: Soft. Bowel sounds are normal. He exhibits no distension. There is no tenderness.   Musculoskeletal: Normal range of motion. He exhibits no edema or deformity.   Neurological: He is alert and oriented to person, place, and time. No cranial nerve deficit.   Skin: Skin is warm and dry. No rash noted. He is not diaphoretic. No erythema.   Psychiatric: He has a normal mood and affect. His behavior is normal.       Significant Labs:   CBC:   Recent Labs   Lab 02/02/20  0400 02/03/20  0653   WBC 5.57 5.09   HGB  10.2* 9.9*   HCT 33.8* 32.4*   * 119*     CMP:   Recent Labs   Lab 02/02/20  0400 02/03/20  0653    142   K 3.3* 3.3*   * 114*   CO2 23 20*   * 80   BUN 21 16   CREATININE 1.9* 1.7*   CALCIUM 8.8 8.8   PROT 6.3 6.3   ALBUMIN 2.7* 2.7*   BILITOT 0.3 0.3   ALKPHOS 75 69   AST 17 14   ALT 16 14   ANIONGAP 7* 8   EGFRNONAA 35.2* 40.3*     Microbiology Results (last 7 days)     Procedure Component Value Units Date/Time    Blood culture x two cultures. Draw prior to antibiotics. [[id number]]  (Abnormal)  (Susceptibility) Collected:  01/31/20 1809    Order Status:  Completed Specimen:  Blood from Peripheral, Forearm, Left Updated:  02/03/20 1024     Blood Culture, Routine Gram stain eleonora bottle: Gram negative rods       Results called to and read back by:Berkley Salazar RN  02/01/2020        08:52      Gram stain aer bottle: Gram negative rods       Positive results previously called 02/01/2020  15:54      ESCHERICHIA COLI    Narrative:       Aerobic and anaerobic    Urine culture [[id number]]  (Abnormal)  (Susceptibility) Collected:  01/31/20 2152    Order Status:  Completed Specimen:  Urine Updated:  02/03/20 0228     Urine Culture, Routine ESCHERICHIA COLI  >100,000 cfu/ml      Narrative:       Preferred Collection Type->Urine, Clean Catch    Blood culture [[id number]] Collected:  02/02/20 1743    Order Status:  Completed Specimen:  Blood Updated:  02/03/20 0115     Blood Culture, Routine No Growth to date    Blood culture [[id number]] Collected:  02/02/20 1743    Order Status:  Completed Specimen:  Blood Updated:  02/03/20 0115     Blood Culture, Routine No Growth to date    Urine culture [[id number]] Collected:  02/01/20 1504    Order Status:  Completed Specimen:  Urine Updated:  02/02/20 2355     Urine Culture, Routine No growth    Narrative:       Preferred Collection Type->Urine, Clean Catch    Blood culture [[id number]] Collected:  02/01/20 1749    Order Status:  Completed Specimen:  Blood  Updated:  02/02/20 2012     Blood Culture, Routine No Growth to date      No Growth to date    Blood culture [[id number]] Collected:  02/01/20 1749    Order Status:  Completed Specimen:  Blood Updated:  02/02/20 2012     Blood Culture, Routine No Growth to date      No Growth to date    Blood culture x two cultures. Draw prior to antibiotics. [[id number]] Collected:  01/31/20 1809    Order Status:  Completed Specimen:  Blood from Peripheral, Wrist, Left Updated:  02/02/20 2012     Blood Culture, Routine No Growth to date      No Growth to date      No Growth to date    Narrative:       Aerobic and anaerobic    Group A Strep, Molecular [[id number]] Collected:  02/01/20 1626    Order Status:  Completed Specimen:  Throat Updated:  02/01/20 1801     Group A Strep, Molecular Negative    Group A Strep, Molecular [[id number]] Collected:  02/01/20 1505    Order Status:  Canceled Specimen:  Throat     Respiratory Infection Panel, Nasopharyngeal [[id number]]  (Abnormal) Collected:  01/31/20 2344    Order Status:  Completed Specimen:  Nasopharyngeal Swab Updated:  02/01/20 0643     Respiratory Infection Panel Source NP Swab     Adenovirus Not Detected     Coronavirus 229E Not Detected     Coronavirus HKU1 Not Detected     Coronavirus NL63 Not Detected     Coronavirus OC43 Not Detected     Human Metapneumovirus Not Detected     Human Rhinovirus/Enterovirus Detected     Influenza A (subtypes H1, H1-2009,H3) Not Detected     Influenza B Not Detected     Parainfluenza Virus 1 Not Detected     Parainfluenza Virus 2 Not Detected     Parainfluenza Virus 3 Not Detected     Parainfluenza Virus 4 Not Detected     Respiratory Syncytial Virus Not Detected     Bordetella Parapertussis (LL6344) Not Detected     Bordetella pertussis (ptxP) Not Detected     Chlamydia pneumoniae Not Detected     Mycoplasma pneumoniae Not Detected     Comment: Respiratory Infection Panel testing performed by Multiplex PCR.       Narrative:       For all other  respiratory sources order [id number] Respiratory  Viral Panel by PCR (RVPCR)    Culture, Respiratory with Gram Stain [[id number]]     Order Status:  No result Specimen:  Respiratory     Influenza A & B by Molecular [[id number]] Collected:  01/31/20 1809    Order Status:  Completed Specimen:  Nasopharyngeal Swab Updated:  01/31/20 1845     Influenza A, Molecular Negative     Influenza B, Molecular Negative     Flu A & B Source Nasal swab    Urine culture - High Risk **CANNOT BE ORDERED STAT** [[id number]]     Order Status:  No result Specimen:  Urine           Significant Imaging: I have reviewed all pertinent imaging results/findings within the past 24 hours.

## 2020-02-03 NOTE — PT/OT/SLP EVAL
"Physical Therapy Evaluation and Discharge Note    Patient Name:  Alin Burkett   MRN:  [id number]    Recommendations:     Discharge Recommendations:  home   Discharge Equipment Recommendations: none   Barriers to discharge: None    Assessment:     Alin Burkett is a 70 y.o. male admitted with a medical diagnosis of Acute kidney injury superimposed on chronic kidney disease. Pt resistant to questioning and required encouragement to participate with PT evaluation at this time. However, patient is independent and functioning at his prior level of function and does not require further acute PT services. Has assist of significant other as needed. Will D/C from PT.     Recent Surgery: * No surgery found *      Plan:     During this hospitalization, patient does not require further acute PT services.  Please re-consult if situation changes.      Subjective     Chief Complaint: "Why are you all here?"  Patient/Family Comments/goals: "Why are you asking me all these questions?"  Pain/Comfort:  · Pain Rating 1: 0/10  · Pain Rating Post-Intervention 1: 0/10    Patients cultural, spiritual, Lutheran conflicts given the current situation: no    Living Environment:  Prior to admission, patients level of function was independent but has walking stick if needed. Lives with significant other in an [facility] with threshold to enter.  Equipment used at home: none.  Upon discharge, patient will have assistance from significant other.    Objective:     Communicated with RN prior to session.  Patient found in bathroom with peripheral IV upon PT entry to room.    General Precautions: Standard, fall, droplet, contact   Orthopedic Precautions:N/A   Braces: N/A     Exams:  · Cognitive Exam:  Patient is oriented to Person, Place, Time and Situation, resistant to questioning during session but eventually cooperative   · RLE Strength: WFL  · LLE Strength: WFL    Functional Mobility:  · Transfers:     · Sit to Stand:  independence with no " AD  · Gait: 15' x 1 with IV pole, no gait deviations noted  · Balance: Good, no LOBs or falls    AM-PAC 6 CLICK MOBILITY  Total Score:24       Therapeutic Activities and Exercises:   Pt educated on: PT role/POC; safety with mobility; benefits of OOB activities; discharge recommendations. Pt verbalized understanding.       AM-PAC 6 CLICK MOBILITY  Total Score:24     Patient left up in chair with all lines intact, call button in reach and RN notified.    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  Renal allograft biopsy 17 (DIVINE): 21 glomeruli, none globally sclerosed, <5% interstitial fibrosis, no ACR, c4d negative, AVR CCT Type 2 (V1 lesion); plan THYMO     Dissecting aortic aneurysm (any part), abdominal     Diverticulosis     Encounter for blood transfusion     ESRD (end stage renal disease) 2010    H/O urethral stricture 2018    H/O: urethral stricture     History of AAA (abdominal aortic aneurysm) repair     History of urethral stricture 2018    Hypertension     Hypokalemia     Hypothyroidism 1/10/2014    Inguinal hernia  bilateral, non-recurrent     Kidney stones     Organ transplant candidate 11/26/2013    Plantar warts 1/10/2014    Recurrent UTI 7/28/2017    S/P kidney transplant     Secondary hyperparathyroidism, renal     Thyroid disease        Past Surgical History:   Procedure Laterality Date    ABDOMINAL SURGERY      exploratory lapatomy x 2    ABLATION N/A 8/22/2019    Procedure: ABLATION, SVT;  Surgeon: Emerson Mcmanus MD;  Location: [facility] EP LAB;  Service: Cardiology;  Laterality: N/A;  SVT, RFA, CARTO, anes, GP, 323    AORTA - SUPERIOR MESENTERIC ARTERY BYPASS GRAFT      BLADDER NECK RECONSTRUCTION      BLADDER SURGERY      COLONOSCOPY  10/10/2013    Dr. Gutierrez, repeat in 5 years    CYSTOSCOPY      CYSTOSCOPY N/A 12/19/2018    Procedure: CYSTOSCOPY;  Surgeon: Dewey Mann MD;  Location: [facility] OR 1ST FLR;  Service: Urology;  Laterality: N/A;  45 min    CYSTOURETHROSCOPY WITH DIRECT VISION INTERNAL URETHROTOMY N/A 12/19/2018    Procedure: CYSTOSCOPY, WITH DIRECT VISION INTERNAL URETHROTOMY;  Surgeon: Dewey Mann MD;  Location: [facility] OR 1ST FLR;  Service: Urology;  Laterality: N/A;    DILATION OF URETHRA N/A 12/19/2018    Procedure: DILATION, URETHRA;  Surgeon: Dewey Mann MD;  Location: [facility] OR 1ST FLR;  Service: Urology;  Laterality: N/A;    FLEXIBLE CYSTOSCOPY N/A 11/6/2019    Procedure: CYSTOSCOPY, FLEXIBLE;  Surgeon: Dewey Mann MD;  Location: [facility] OR 2ND FLR;  Service: Urology;  Laterality: N/A;    GASTROJEJUNOSTOMY      HEMORRHOID SURGERY      HERNIA REPAIR      KIDNEY TRANSPLANT      LEFT HEART CATHETERIZATION Left 8/20/2019    Procedure: Left heart cath;  Surgeon: Javan Oscar MD;  Location: [facility] CATH/EP LAB;  Service: Cardiology;  Laterality: Left;    LITHOTRIPSY      PERCUTANEOUS NEPHROLITHOTRIPSY      right  ESWL  10/31/12    right ESWL  6/26/12    URETHROPLASTY USING PATCH GRAFT N/A 11/6/2019    Procedure: URETHROPLASTY, USING PATCH GRAFT BUCCAL MUCOSA GRAFT;  Surgeon:  Dewey Mann MD;  Location: [facility] OR [address] [place] [address];  Service: Urology;  Laterality: N/A;  3 HOURS       Time Tracking:     PT Received On: 02/03/20  PT Start Time: 1146     PT Stop Time: 1200  PT Total Time (min): 14 min     Billable Minutes: Evaluation 14 min      Ronna Peoples, PT, DPT  02/03/2020

## 2020-02-03 NOTE — PLAN OF CARE
Pt ambulate. Remain free from fall and injuries. VSS, no spike on temperature this shift.  Denies pain. Safety maintained. Ambulated.  Will monitor.

## 2020-02-03 NOTE — PT/OT/SLP EVAL
"Occupational Therapy   Evaluation and Discharge Note    Name: Alin Burkett  MRN: [id number]  Admitting Diagnosis:  Acute kidney injury superimposed on chronic kidney disease      Recommendations:     Discharge Recommendations: home  Discharge Equipment Recommendations:  none  Barriers to discharge:  None    Assessment:     Alin Burkett is a 70 y.o. male with a medical diagnosis of Acute kidney injury superimposed on chronic kidney disease. At this time, patient is functioning at their prior level of function and does not require further acute OT services.      Plan:     During this hospitalization, patient does not require further acute OT services.  Please re-consult if situation changes.    · Plan of Care Reviewed with: patient    Subjective     Chief Complaint: "Why are you all here"?  Patient/Family Comments/goals: none    Occupational Profile:  Living Environment: Patient lives with significant other in a 1  with 1 IVONNE to enter. Patient has a tub shower combo with no grab bars or bench. Patient has a regular toilet. Patient has a walking stick, but does not use it. Patient was independent with ADLs and functional mobility PTA.     Pain/Comfort:  · Pain Rating 1: 0/10  · Pain Rating Post-Intervention 1: 0/10    Patients cultural, spiritual, Caodaism conflicts given the current situation: no    Objective:     Communicated with: NSAGUILA prior to session.  Patient found on toilet with peripheral IV upon OT entry to room.    General Precautions: Standard, droplet, fall, contact   Orthopedic Precautions:N/A   Braces: N/A     Occupational Performance:    Bed Mobility:    · Patient completed Sit to Supine with independence    Functional Mobility/Transfers:  · Patient completed Sit <> Stand Transfer with independence  with  no assistive device   · Patient completed Toilet Transfer toilet>bed with functional ambulation technique with independence with  no AD    Activities of Daily Living:  · Upper Body Dressing: " independence per patient report. Patient declined to perform task.   · Lower Body Dressing: independence per patient report. Patient declined to perform task.  · Toileting: independence      Cognitive/Visual Perceptual:  Cognitive/Psychosocial Skills:     -       Oriented to: Person, Place, Time and Situation   -       Follows Commands/attention:Follows multistep  commands  -       Communication: clear/fluent but resistant to answering questions asked  -       Memory: No Deficits noted  -       Safety awareness/insight to disability: intact   -       Mood/Affect/Coping skills/emotional control: Appropriate to situation  Visual/Perceptual:      -Intact      Physical Exam:  Upper Extremity Range of Motion:     -       Right Upper Extremity: WFL  -       Left Upper Extremity: WFL  Upper Extremity Strength:    -       Right Upper Extremity: WFL  -       Left Upper Extremity: WFL   Strength:    -       Right Upper Extremity: WFL  -       Left Upper Extremity: WFL    AMPAC 6 Click ADL:  AMPAC Total Score: 24    Treatment & Education:  Role of OT and POC  Safety  ADL retraining  Functional mobility training  Discharge planning  Education:    Patient left HOB elevated with call button in reach and all needs met.     GOALS:   Multidisciplinary Problems     Occupational Therapy Goals     Not on file          Multidisciplinary Problems (Resolved)        Problem: Occupational Therapy Goal    Goal Priority Disciplines Outcome Interventions   Occupational Therapy Goal   (Resolved)     OT, PT/OT Met                    History:     Past Medical History:   Diagnosis Date    Acidosis     Adrenal adenoma     Anemia associated with chronic renal failure     Arrhythmia, onset 1995 5/1/2015    Awaiting organ transplant status 11/26/2013    Basal cell carcinoma 06/12/2012    left nasal tip    Blood type B+ 11/26/2013    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  Renal allograft biopsy 17 (DIVINE): 21 glomeruli, none globally sclerosed, <5% interstitial fibrosis, no ACR, c4d negative, AVR CCT Type 2 (V1 lesion); plan THYMO     Dissecting aortic aneurysm (any part), abdominal     Diverticulosis     Encounter for blood transfusion     ESRD (end stage renal disease) 2010    H/O urethral stricture 2018    H/O: urethral stricture     History of AAA (abdominal aortic aneurysm) repair     History of urethral stricture 2018    Hypertension     Hypokalemia     Hypothyroidism 1/10/2014    Inguinal hernia bilateral, non-recurrent     Kidney stones     Organ transplant candidate 2013    Plantar warts 1/10/2014    Recurrent UTI 2017    S/P kidney transplant     Secondary hyperparathyroidism, renal     Thyroid disease        Past Surgical History:   Procedure Laterality Date    ABDOMINAL SURGERY      exploratory lapatomy x 2    ABLATION N/A 2019    Procedure: ABLATION, SVT;  Surgeon: Emerson Mcmanus MD;  Location: [facility] EP LAB;  Service: Cardiology;  Laterality: N/A;  SVT, RFA, CARTO, anes, GP, 323    AORTA - SUPERIOR MESENTERIC ARTERY BYPASS GRAFT      BLADDER NECK RECONSTRUCTION      BLADDER SURGERY      COLONOSCOPY  10/10/2013    Dr. Gutierrez, repeat in 5 years    CYSTOSCOPY      CYSTOSCOPY N/A 2018    Procedure: CYSTOSCOPY;  Surgeon: Dewey Mann MD;  Location: [facility] OR [address] [place] [address];  Service: Urology;  Laterality: N/A;  45 min    CYSTOURETHROSCOPY WITH DIRECT VISION INTERNAL URETHROTOMY N/A 2018    Procedure: CYSTOSCOPY, WITH DIRECT VISION INTERNAL URETHROTOMY;  Surgeon: Dewey Mann MD;  Location: [facility] OR [address] [place] [address];  Service: Urology;  Laterality: N/A;    DILATION OF URETHRA N/A 2018    Procedure: DILATION,  URETHRA;  Surgeon: Dewey Mann MD;  Location: [facility] OR 1ST FLR;  Service: Urology;  Laterality: N/A;    FLEXIBLE CYSTOSCOPY N/A 11/6/2019    Procedure: CYSTOSCOPY, FLEXIBLE;  Surgeon: Dewey Mann MD;  Location: [facility] OR 2ND FLR;  Service: Urology;  Laterality: N/A;    GASTROJEJUNOSTOMY      HEMORRHOID SURGERY      HERNIA REPAIR      KIDNEY TRANSPLANT      LEFT HEART CATHETERIZATION Left 8/20/2019    Procedure: Left heart cath;  Surgeon: Javan Oscar MD;  Location: [facility] CATH/EP LAB;  Service: Cardiology;  Laterality: Left;    LITHOTRIPSY      PERCUTANEOUS NEPHROLITHOTRIPSY      right  ESWL  10/31/12    right ESWL  6/26/12    URETHROPLASTY USING PATCH GRAFT N/A 11/6/2019    Procedure: URETHROPLASTY, USING PATCH GRAFT BUCCAL MUCOSA GRAFT;  Surgeon: Dewey Mann MD;  Location: [facility] OR [address] [place] [address];  Service: Urology;  Laterality: N/A;  3 HOURS       Time Tracking:     OT Date of Treatment: 02/03/20  OT Start Time: 1143  OT Stop Time: 1200  OT Total Time (min): 17 min    Billable Minutes:Evaluation 17    TAYO La  2/3/2020

## 2020-02-03 NOTE — CONSULTS
Single lumen 18G x 8CM midline placed right basilic vein. Max dwell date 3/3/20, Lot# [id number].  Needle advanced into the vessel under real time ultrasound guidance.  Image recorded and saved.

## 2020-02-03 NOTE — PROGRESS NOTES
[facility]  Kidney Transplant  Progress Note      Reason for Follow-up: Reassessment of Kidney Transplant - 11/26/2016  (#1) recipient and management of immunosuppression.    ORGAN: LEFT KIDNEY    Donor Type: Donation after Brain Death        Subjective:   History of Present Illness:  Alin is a 69 yo AAM post renal transplant 2016 who presented with feeling poorly, cough, chills, night sweats and sore throat. He reports sx started Monday and URI sx have gotten better, but he actually felt worse and started with HA. Found + rhinovirus, UA with pyuria suspicious for another UTI and GNR bacteremia.  Recently admitted for UTI and diverticulitis, and has completed atb for that. He follows with Dr. Mann for urethral stricture and stated he has had no LUTS symptoms since urethral dilation last fall.  IS: tacrolimus 3/2 and  mg BID. MMF was held while on antibiotics and recently restarted.    Mr. Burkett is a 70 y.o. year old male who is status post Kidney Transplant - 11/26/2016  (#1).    His maintenance immunosuppression consists of:   Immunosuppressants (From admission, onward)    Start     Stop Route Frequency Ordered    02/02/20 2000  tacrolimus capsule 2 mg      -- Oral Daily 02/01/20 0204    02/01/20 0800  tacrolimus capsule 3 mg      -- Oral Daily 02/01/20 0204        Interval History:  Patient seen and examined at bedside and found in no acute distress. Scr on decreasing trend since admission. Tacrolimus trough within acceptable range 6.7 (~10.5hrs). NAEON reported.    Past Medical, Surgical, Family, and Social History:   Unchanged from H&P.    Scheduled Meds:   aspirin  81 mg Oral Daily    cefTRIAXone (ROCEPHIN) IVPB  2 g Intravenous Q24H    famotidine  20 mg Oral QHS    ketoconazole  100 mg Oral Daily    levothyroxine  100 mcg Oral Before breakfast    magnesium oxide  400 mg Oral Daily    metoprolol tartrate  12.5 mg Oral BID    predniSONE  5 mg Oral Daily    sodium bicarbonate  " 650 mg Oral BID    tacrolimus  2 mg Oral Daily AM    tacrolimus  3 mg Oral Daily AM     Continuous Infusions:  PRN Meds:acetaminophen, dextrose 10 % in water (D10W), dextrose 10 % in water (D10W), glucagon (human recombinant), glucose, glucose, ondansetron, promethazine, sodium chloride 0.9%, sodium chloride 0.9%    Intake/Output - Last 3 Shifts       02/01 0700 - 02/02 0659 02/02 0700 - 02/03 0659 02/03 0700 - 02/04 0659    P.O. 1070 920     IV Piggyback 300 300     Total Intake(mL/kg) 1370 (20.5) 1220 (18.3)     Urine (mL/kg/hr) 1725 (1.1) 1100 (0.7)     Stool 0 0     Total Output 1725 1100     Net -355 +120            Urine Occurrence  3 x     Stool Occurrence 1 x 0 x     Emesis Occurrence  0 x            Review of Systems   Objective:     Vital Signs (Most Recent):  Temp: 96 °F (35.6 °C) (02/03/20 0432)  Pulse: 63 (02/03/20 0854)  Resp: 18 (02/03/20 0853)  BP: 127/88 (02/03/20 0854)  SpO2: 98 % (02/03/20 0853) Vital Signs (24h Range):  Temp:  [96 °F (35.6 °C)-98.9 °F (37.2 °C)] 96 °F (35.6 °C)  Pulse:  [63-72] 63  Resp:  [15-18] 18  SpO2:  [96 %-98 %] 98 %  BP: (125-155)/(77-95) 127/88     Weight: 66.8 kg (147 lb 4.3 oz)  Height: 5' 11" (180.3 cm)  Body mass index is 20.54 kg/m².    Physical Exam   Constitutional: He is oriented to person, place, and time. He appears well-developed and well-nourished. He is cooperative. No distress.   HENT:   Mouth/Throat: Mucous membranes are normal. No oral lesions.   Eyes: Conjunctivae and lids are normal. No scleral icterus.   Neck: Trachea normal. Neck supple. No JVD present. No thyroid mass present.   Cardiovascular: Normal rate and regular rhythm.   Pulmonary/Chest: Effort normal. He has no rales.   Abdominal: Soft. He exhibits no mass. There is no hepatosplenomegaly. There is no tenderness. There is no CVA tenderness.   Musculoskeletal: Normal range of motion. He exhibits no edema.   Neurological: He is alert and oriented to person, place, and time. He displays no " tremor. Gait normal.   Skin: Skin is warm and dry. No lesion and no rash noted. No cyanosis. Nails show no clubbing.   Psychiatric: He has a normal mood and affect. His speech is normal. Cognition and memory are normal.       Laboratory:  CBC:   Recent Labs   Lab 20  0653   WBC 5.62 5.57 5.09   RBC 3.57* 3.41* 3.27*   HGB 10.9* 10.2* 9.9*   HCT 35.0* 33.8* 32.4*   PLT 89* 113* 119*   MCV 98 99* 99*   MCH 30.5 29.9 30.3   MCHC 31.1* 30.2* 30.6*     CMP:   Recent Labs   Lab 20  0653   GLU 95 116* 80   CALCIUM 8.8 8.8 8.8   ALBUMIN 2.9* 2.7* 2.7*   PROT 6.5 6.3 6.3    141 142   K 4.0 3.3* 3.3*   CO2 20* 23 20*    111* 114*   BUN 22 21 16   CREATININE 1.9* 1.9* 1.7*   ALKPHOS 71 75 69   ALT 17 16 14   AST 20 17 14     Labs within the past 24 hours have been reviewed.        Assessment/Plan:     * Acute kidney injury superimposed on chronic kidney disease  -DIVINE complicating renal transplant, likely from poor po intake and ?SIRS/sepsis. CKD3 s/p kidney transplantation.  - Scr on decreasing trend towards baseline. No need for RRT. Encourage PO intake.    -Strict I/Os, BP (goal <140/90).    -Avoid nephrotoxic agents (NSAIDs, IV contrast dye, ACEI/ARB anti-HTN medications, Aminoglycoside-containing antibiotics)  -Renally dose all appropriate medications.      Pyelonephritis, acute  - Managed by primary team.  - Currently on Rocephin 2 gr IV qd      Bacteremia  - Atb and management per hosp med.       Stage 3 chronic kidney disease  See DIVINE      -donor kidney transplant  *PHS-IR DDKT induced w Campath. KDPI 36%.   Plan/Rec:  - Daily renal panel  - Baseline Scr ~1.2-1.3.  - Currently 1.7 from 1.9 yesterday  - Stable presently; see CKD    Immunosuppressive management encounter following kidney transplant  Encounter for Monitoring Immunosuppression post Transplant  Recent Labs   Lab 20  0835 20  0400 20  0653    Tacrolimus Lvl 3.3 L 4.9 L 6.7   *Target level for Alin is 4-6  Plan/Rec:  - Continue current Prograf dose 3/2  - Continue Prednisone 5 mg   - Hold MMF until antibiotic therapy is completed.   - Recheck as per guidelines.  - Monitor for side effects and toxicities, given narrow therapeutic window and significant risk of AE           Discharge Planning:  Per primary team    Goldy Sutton MD  Kidney Transplant  [facility]

## 2020-02-03 NOTE — PROGRESS NOTES
[facility] Hospital Medicine  Progress Note    Patient Name: Alin Burkett  MRN: [id number]  Patient Class: IP- Inpatient   Admission Date: 1/31/2020  Length of Stay: 3 days  Attending Physician: Dameon Anderson MD  Primary Care Provider: Carmen Krueger MD    [facility] Medicine Team: [facility] 1 Vimal Irizarry MD    Subjective:     Principal Problem:Acute kidney injury superimposed on chronic kidney disease        HPI:  Mr. Burkett is a 70 year old male with ESRD s/p KTx (2016), BPH, urethral stricture s/p stenting, recurrent UTIs (Hx ESBL, VRE), AAA s/p repair, HTN, diverticulosis, and hypothyroidism that presents with productive cough, chills, sore throat, night sweats. Wife at bedside during H/P.    Patient was admitted earlier this month for acute diverticulitis (see Hospital Course below). Course was complicated by UTI that grew pan-sensitive Pseudomonas. He completed cipro x 10 days + flagyl x 7 days. He recovered fully and was back to his baseline until 1/27 when he developed sore throat. Throughout the day, he developed productive cough (swallows sputum so unsure what color). Symptoms have been progressively worse and associated with frontal headaches, chills, and occasional night sweats. He has taken excedrin for headaches with moderate improvement. He has been staying well hydrated but has had decreased appetite. Has had urinary frequency as well. Suffers from frequent UTIs that typical manifest as urethral burning prior to urination. Denies subjective fevers, nausea/vomiting, abdominal pain, dysuria, flank pain. Has diarrhea at baseline without change. Of note, he has missed last 3 doses of tacrolimus (typically takes 3mg in am, 2mg in pm).    Hospital Course from 1/6-1/9:  Patient with kidney transplant and on chronic immunosuppression was admitted for 2 episodes of painless blood per rectum with associated LLQ pain on palpation at home. On arrival he was noted to have positive  fecal occult and with unknown last colonoscopy. He noted no fever chills, nausea, vomiting, confusion, LH, hemoptysis, hematemesis, chest pain, sob, hematuria, dysuria. Over hospital course, he had one additional episode but with stable hb and no leukocytosis. CT without contrast demonstrated diverticulosis with possible diverticulitis. GI consulted and said no intervention at this time was indicated but will follow up outpatient. Patient was started on cipro and flagyll. KTM consulted and recommended continuing patients home immunosuppression regimen. They also noted that patient should have a CT with contrast and 48 hours of IVF thereafter to avoid DIVINE given his kidney transplant history so that the patient can CT with contrast was suspicious for diverticulitis. Of note, he was found to have >100,000 CFU of gram negative bacteria on UCx following a dirty UA. KTM recommended waiting for speciation and sensitivities prior to discharging patient given his immunosuppressed state. Cultures with Pseudomonas sensitive to Ciprofloxacin. Patient to complete 10 day course of Ciprofloxacin and 7 day course of Metronidazole. Patient to resume cellcept after antibiotic completion.       Overview/Hospital Course:  Admitted to Hospital Medicine for evaluation/management of productive cough, sore throat, fevers. Found to have DIVINE and positive UA for many bacteria, in the setting of multiple prior UTIs and urethral stricture. RIP is positive for Rhinovirus/Enterovirus. CXR clear. No leukocytosis. BCx positive for E. Coli. Being treated for DIVINE 2/2 urosepsis. Evaluated by Kidney Transplant Medicine, tacro level in therapeutic window of 4-6. Holding home MMF until ABx are ended. Treating E. Coli bacteremia with pip-tazo which was deescalated to IV Rocephin on 2/3 after UCx susceptibilities returned. Will need to be on total of 2 weeks of therapy (ending 2/15). Transplant medicine to follow labs outpatient. Will schedule Urology  outpatient follow-up prior to discharge.     Interval History: NAEON. Patient comfortable this AM. Understands plan for home IV ABx and urology follow up. Without complaints.     Review of Systems   Constitutional: Negative for chills and fever.   HENT: Negative for congestion and trouble swallowing.    Eyes: Negative for visual disturbance.   Respiratory: Positive for cough (improved). Negative for shortness of breath and wheezing.    Cardiovascular: Negative for chest pain, palpitations and leg swelling.   Gastrointestinal: Negative for abdominal pain, nausea and vomiting.   Genitourinary: Positive for frequency. Negative for difficulty urinating, dysuria and hematuria.   Musculoskeletal: Negative for arthralgias and myalgias.   Skin: Negative for rash.   Neurological: Negative for dizziness and light-headedness.   Psychiatric/Behavioral: Negative for agitation and confusion.     Objective:     Vital Signs (Most Recent):  Temp: 98.5 °F (36.9 °C) (02/03/20 1131)  Pulse: 69 (02/03/20 1131)  Resp: 18 (02/03/20 1131)  BP: 123/75 (02/03/20 1131)  SpO2: 98 % (02/03/20 1131) Vital Signs (24h Range):  Temp:  [96 °F (35.6 °C)-98.5 °F (36.9 °C)] 98.5 °F (36.9 °C)  Pulse:  [63-72] 69  Resp:  [15-18] 18  SpO2:  [96 %-98 %] 98 %  BP: (123-155)/(75-95) 123/75     Weight: 66.8 kg (147 lb 4.3 oz)  Body mass index is 20.54 kg/m².    Intake/Output Summary (Last 24 hours) at 2/3/2020 1327  Last data filed at 2/3/2020 0646  Gross per 24 hour   Intake 640 ml   Output 500 ml   Net 140 ml      Physical Exam   Constitutional: He is oriented to person, place, and time. No distress.   HENT:   Head: Normocephalic and atraumatic.   Eyes: Right eye exhibits no discharge. Left eye exhibits no discharge. No scleral icterus.   Neck: Normal range of motion. No JVD present.   Cardiovascular: Normal rate and regular rhythm.   Pulmonary/Chest: Effort normal and breath sounds normal.   Abdominal: Soft. He exhibits no distension.   Musculoskeletal: He  exhibits no edema or tenderness.   Neurological: He is alert and oriented to person, place, and time.   Skin: Skin is warm and dry. He is not diaphoretic.   Vitals reviewed.      Significant Labs:   CBC:   Recent Labs   Lab 02/02/20  0400 02/03/20  0653   WBC 5.57 5.09   HGB 10.2* 9.9*   HCT 33.8* 32.4*   * 119*     CMP:   Recent Labs   Lab 02/02/20  0400 02/03/20  0653    142   K 3.3* 3.3*   * 114*   CO2 23 20*   * 80   BUN 21 16   CREATININE 1.9* 1.7*   CALCIUM 8.8 8.8   PROT 6.3 6.3   ALBUMIN 2.7* 2.7*   BILITOT 0.3 0.3   ALKPHOS 75 69   AST 17 14   ALT 16 14   ANIONGAP 7* 8   EGFRNONAA 35.2* 40.3*       Significant Imaging: I have reviewed all pertinent imaging results/findings within the past 24 hours.      Assessment/Plan:      * Acute kidney injury superimposed on chronic kidney disease  69 yo M presenting with respiratory symptoms + urinary frequency that is admitted with DIVINE on CKD of a transplanted kidney. 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 S/p kidney transplant in 2016. On tacrolimus (3mg in am, 2mg in pm) + MMF, but he has not taken medication over last few days. DDx includes UTI (UA suggestive of infection), post-renal obstruction (BPH vs urethral stricture), pre-renal azotemia 2/2 dehydration in setting of viral illness, graft dysfunction due to missing recent doses of tacro (less likely).    Kidney transplant ultrasound: increased resistive indices; minimal transplant hydronephrosis improved from prior    Plan:  - Blood and urine cultures growing E. Coli; susceptible to Rocephin   - Stop Zosyn and begin IV Rocephin for total of 2 weeks of therapy. End Date 2/15  - Home health placed for infusion   - Transplant ID consulted and comfortable with therapy. Will follow up labs outpatient. Appreciate assistance   - Will consult midline team. Blood Cx from 2/1 have been NGTD  - Will schedule o/p f/u with urologist   - Holding MMF  while bacteremic  - Continue tacrolimus at home dose; therapeutic range 4-6 per KTM  - Continue prednisone 5mg    - Trend Cr  - Strict I&Os  - Avoid nephrotoxic agents  - Renally adjust medications    Immunocompromised state        Pyelonephritis, acute        Bacteremia  BCx and UCx both positive for E. Coli    Viral URI with cough  - RIP panel positive for rhinovirus/enterovirus  - Continue supportive care    Complicated UTI (urinary tract infection)  Hx urethral stricture s/p urethral stenting in 2019  Suffers from recurrent UTIs, Hx ESBL Klebsiella + VRE  Most recent UTI grew pan-sensitive Pseudomonas earlier this month    UA with RBC 85, WBC > 100. BCx + UCx pending    Plan:  - F/u UCx  - Continue zosyn  - Renal US to evaluate for hydronephrosis    SVT (supraventricular tachycardia)  Continue home Lopressor      Recurrent UTI        Stricture of anterior urethra in male        Stage 3 chronic kidney disease        -donor kidney transplant        Immunosuppressive management encounter following kidney transplant        BPH (benign prostatic hyperplasia)        Acquired hypothyroidism  Continue home levothyroxine      Anemia of chronic disease  CBCs daily  Transfuse with goal Hb > 7    Essential hypertension  Continue home Lopressor      VTE Risk Mitigation (From admission, onward)         Ordered     IP VTE LOW RISK PATIENT  Once      20                      Vimal Irizarry MD  Department of Hospital Medicine   [facility]

## 2020-02-03 NOTE — PLAN OF CARE
[facility]    HOME HEALTH ORDERS  FACE TO FACE ENCOUNTER    Patient Name: Alin Burkett  YOB: 1950    PCP: Carmen Krueger MD   PCP Address: [address] / [place] [address]  PCP Phone Number: [phone]  PCP Fax: [phone]    Encounter Date: 2020    Admit to Home Health    Diagnoses:  Active Hospital Problems    Diagnosis  POA    *Acute kidney injury superimposed on chronic kidney disease [N17.9, N18.9]  Yes    Pyelonephritis, acute [N10]  Yes    Viral URI with cough [J06.9, B97.89]  Yes    Bacteremia [R78.81]  Unknown    Recurrent UTI [N39.0]  Yes    Stage 3 chronic kidney disease [N18.3]  Yes     Chronic    -donor kidney transplant [Z94.0]  Not Applicable     Induced w Campath 30 mg IV intraoperatively & SoluMedrol 875 mg total over 3 days.   Renal allograft biopsy 17 (DIVINE): 21 glomeruli, none globally sclerosed, <5% interstitial fibrosis, no ACR, c4d negative, AVR CCT Type 2 (V1 lesion); plan THYMO   Renal allograft biopsy 17 (follow-up after rejection treatment): 27 glomeruli, no AVR, no ACR, C4d negative, no microcirculation changes, 5-10% interstitial fibrosis; ?cast --> further studies pending      Immunosuppressive management encounter following kidney transplant [Z79.899, Z94.0]  Not Applicable     Chronic    Acquired hypothyroidism [E03.9]  Yes    Essential hypertension [I10]  Yes     Chronic      Resolved Hospital Problems   No resolved problems to display.       Future Appointments   Date Time Provider Department Center   2020  3:00 PM Dewey Mann MD [facility] UROLOGY [facility]   3/23/2020 10:20 AM Dewey Mann MD [facility] UROLOGY [place]           I have seen and examined this patient face to face today. My clinical findings that support the need for the home health skilled services and home bound status are the following:  Requiring assistive device to leave home due to unsteady gait caused by  Infection.  Patient with  medication mismanagement issues requiring home bound status as evidenced by  Unstable vital signs (blood pressure, heart rate).    Allergies:Review of patient's allergies indicates:  No Known Allergies    Diet: renal diet    Activities: activity as tolerated    Nursing:   SN to complete comprehensive assessment including routine vital signs. Instruct on disease process and s/s of complications to report to MD. Review/verify medication list sent home with the patient at time of discharge  and instruct patient/caregiver as needed. Frequency may be adjusted depending on start of care date.    Notify MD if SBP > 160 or < 90; DBP > 90 or < 50; HR > 120 or < 50; Temp > 101; Other:      CONSULTS:    Aide to provide assistance with personal care, ADLs, and vital signs.    MISCELLANEOUS CARE:  Home Infusion Therapy:   SN to perform Infusion Therapy/Central Line Care.  Review Central Line Care & Central Line Flush with patient.    Administer (drug and dose): Rocephin 2g every 24 hours   Last dose given: 2/4/2020                        Home dose due: 2/5/2020    Scrub the Hub: Prior to accessing the line, always perform a 30 second alcohol scrub  Each lumen of the central line is to be flushed at least daily with 10 mL Normal Saline and 3 mL Heparin flush (10 units/mL)  Skilled Nurse (SN) may draw blood from IV access  Blood Draw Procedure:   - Aspirate at least 5 mL of blood   - Discard   - Obtain specimen   - Change injection cap   - Flush with 20 mL Normal Saline followed by a                 3-5 mL Heparin flush (10 units/mL)  Central :   - Sterile dressing changes are done weekly and as needed.   - Use chlor-hexadine scrub to cleanse site, apply Biopatch to insertion site,       apply securement device dressing   - Injection caps are changed weekly and after EVERY lab draw.   - If sterile gauze is under dressing to control oozing,                 dressing change must be performed every 24 hours until gauze  is not needed.    Weekly CBC and CMP until off of antibiotics. Please fax results to ID clinic at [phone]    Medications: Review discharge medications with patient and family and provide education.      Current Discharge Medication List      START taking these medications    Details   cefTRIAXone (ROCEPHIN) 1 gram injection Inject 2 g into the vein once daily. (END 2/15/2020) for 12 days  Qty: 24 g, Refills: 0         CONTINUE these medications which have CHANGED    Details   mycophenolate (CELLCEPT) 250 mg Cap Take 3 capsules (750 mg total) by mouth 2 (two) times daily. Z94.0/Kidney transplant on 11/26/16. HOLD UNTIL ANTIBIOTICS IS COMPLETE; RESUME 2/16/2020  Qty: 180 capsule, Refills: 11         CONTINUE these medications which have NOT CHANGED    Details   aspirin (ECOTRIN) 81 MG EC tablet Take 1 tablet (81 mg total) by mouth once daily.  Refills: 0      calcitRIOL (ROCALTROL) 0.5 MCG Cap Take 1 capsule (0.5 mcg total) by mouth once daily.  Qty: 30 capsule, Refills: 11    Comments: NEW SCRIPT FOR DOSE INCREASE      famotidine (PEPCID) 20 MG tablet Take 1 tablet (20 mg total) by mouth every evening.  Qty: 90 tablet, Refills: 3      ketoconazole (NIZORAL) 200 mg Tab Take 0.5 tablets (100 mg total) by mouth once daily.  Qty: 45 tablet, Refills: 3      levothyroxine (SYNTHROID) 100 MCG tablet Take 1 tablet (100 mcg total) by mouth once daily.  Qty: 90 tablet, Refills: 3      magnesium oxide (MAG-OX) 400 mg (241.3 mg magnesium) tablet Take 1 tablet (400 mg total) by mouth once daily.  Qty: 90 tablet, Refills: 3      metoprolol tartrate (LOPRESSOR) 25 MG tablet Take 0.5 tablets (12.5 mg total) by mouth 2 (two) times daily.  Qty: 90 tablet, Refills: 3      multivitamin (ONE DAILY MULTIVITAMIN) per tablet Take 1 tablet by mouth once daily.      pep injection Inject 0.25 ml as directed.   May increase by 0.05 ml such as 0.30, 0.35 ml etc. Up to 1.00 ml Until adequate result is achieved.    For compounding pharmacy  use:   Add PAPAVERINE 30 mcg  Add PHENTOLAMINE 10 mg  Add ALPROSTADIL 100 mcg  Qty: 1 vial, Refills: 2    Associated Diagnoses: ED (erectile dysfunction) of organic origin      predniSONE (DELTASONE) 5 MG tablet Take 1 tablet (5 mg total) by mouth once daily. Z94.0/Kidney transplant on 11/26/2016  Qty: 90 tablet, Refills: 3      sodium bicarbonate 650 MG tablet Take 1 tablet (650 mg total) by mouth 2 (two) times daily.  Qty: 540 tablet, Refills: 3    Comments: DOSE INCREASE      tacrolimus (PROGRAF) 1 MG Cap Take 2 capsules (2 mg total) by mouth every morning AND 2 capsules every evening. Z94.0/Kidney Transplant on 11/26/16.  Qty: 450 capsule, Refills: 3    Comments: Z94.0/Kidney Transplant on 11/26/16             I certify that this patient is confined to his home and needs intermittent skilled nursing care.

## 2020-02-03 NOTE — HPI
70M hx ESRD s/p KTx 2016 who presented w/ fevers, cough, sore throat on 1/31. UA +, UCX + E. Coli. Blood cultures on admission + E. Coli. Has a hx of urethral stricture, but denies symptoms since dilation in the fall 2019. Follows w/ Dr. Mann. Repeat blood cx on 2/1 NGTD. RIP + rhinovirus. ID consulted for discharge antibiotic recommendations.

## 2020-02-03 NOTE — PLAN OF CARE
Problem: Physical Therapy Goal  Goal: Physical Therapy Goal  Outcome: Met     PT evaluation completed, pt independent with bed mobility, transfers and ambulation. No skilled in house PT indicated at this time, will D/C from PT.     Ronna Peoples, PT, DPT  2/3/2020

## 2020-02-03 NOTE — SUBJECTIVE & OBJECTIVE
Subjective:   History of Present Illness:  Alin is a 69 yo AAM post renal transplant 2016 who presented with feeling poorly, cough, chills, night sweats and sore throat. He reports sx started Monday and URI sx have gotten better, but he actually felt worse and started with HA. Found + rhinovirus, UA with pyuria suspicious for another UTI and GNR bacteremia.  Recently admitted for UTI and diverticulitis, and has completed atb for that. He follows with Dr. Mann for urethral stricture and stated he has had no LUTS symptoms since urethral dilation last fall.  IS: tacrolimus 3/2 and  mg BID. MMF was held while on antibiotics and recently restarted.    Mr. Burkett is a 70 y.o. year old male who is status post Kidney Transplant - 11/26/2016  (#1).    His maintenance immunosuppression consists of:   Immunosuppressants (From admission, onward)    Start     Stop Route Frequency Ordered    02/02/20 2000  tacrolimus capsule 2 mg      -- Oral Daily 02/01/20 0204    02/01/20 0800  tacrolimus capsule 3 mg      -- Oral Daily 02/01/20 0204        Interval History:  Patient seen and examined at bedside and found in no acute distress. Scr on decreasing trend since admission. Tacrolimus trough within acceptable range 6.7 (~10.5hrs). ZOHRAEON reported.    Past Medical, Surgical, Family, and Social History:   Unchanged from H&P.    Scheduled Meds:   aspirin  81 mg Oral Daily    cefTRIAXone (ROCEPHIN) IVPB  2 g Intravenous Q24H    famotidine  20 mg Oral QHS    ketoconazole  100 mg Oral Daily    levothyroxine  100 mcg Oral Before breakfast    magnesium oxide  400 mg Oral Daily    metoprolol tartrate  12.5 mg Oral BID    predniSONE  5 mg Oral Daily    sodium bicarbonate  650 mg Oral BID    tacrolimus  2 mg Oral Daily AM    tacrolimus  3 mg Oral Daily AM     Continuous Infusions:  PRN Meds:acetaminophen, dextrose 10 % in water (D10W), dextrose 10 % in water (D10W), glucagon (human recombinant), glucose, glucose, ondansetron,  "promethazine, sodium chloride 0.9%, sodium chloride 0.9%    Intake/Output - Last 3 Shifts       02/01 0700 - 02/02 0659 02/02 0700 - 02/03 0659 02/03 0700 - 02/04 0659    P.O. 1070 920     IV Piggyback 300 300     Total Intake(mL/kg) 1370 (20.5) 1220 (18.3)     Urine (mL/kg/hr) 1725 (1.1) 1100 (0.7)     Stool 0 0     Total Output 1725 1100     Net -355 +120            Urine Occurrence  3 x     Stool Occurrence 1 x 0 x     Emesis Occurrence  0 x            Review of Systems   Objective:     Vital Signs (Most Recent):  Temp: 96 °F (35.6 °C) (02/03/20 0432)  Pulse: 63 (02/03/20 0854)  Resp: 18 (02/03/20 0853)  BP: 127/88 (02/03/20 0854)  SpO2: 98 % (02/03/20 0853) Vital Signs (24h Range):  Temp:  [96 °F (35.6 °C)-98.9 °F (37.2 °C)] 96 °F (35.6 °C)  Pulse:  [63-72] 63  Resp:  [15-18] 18  SpO2:  [96 %-98 %] 98 %  BP: (125-155)/(77-95) 127/88     Weight: 66.8 kg (147 lb 4.3 oz)  Height: 5' 11" (180.3 cm)  Body mass index is 20.54 kg/m².    Physical Exam   Constitutional: He is oriented to person, place, and time. He appears well-developed and well-nourished. He is cooperative. No distress.   HENT:   Mouth/Throat: Mucous membranes are normal. No oral lesions.   Eyes: Conjunctivae and lids are normal. No scleral icterus.   Neck: Trachea normal. Neck supple. No JVD present. No thyroid mass present.   Cardiovascular: Normal rate and regular rhythm.   Pulmonary/Chest: Effort normal. He has no rales.   Abdominal: Soft. He exhibits no mass. There is no hepatosplenomegaly. There is no tenderness. There is no CVA tenderness.   Musculoskeletal: Normal range of motion. He exhibits no edema.   Neurological: He is alert and oriented to person, place, and time. He displays no tremor. Gait normal.   Skin: Skin is warm and dry. No lesion and no rash noted. No cyanosis. Nails show no clubbing.   Psychiatric: He has a normal mood and affect. His speech is normal. Cognition and memory are normal.       Laboratory:  CBC:   Recent Labs   Lab " 02/01/20 0226 02/02/20 0400 02/03/20  0653   WBC 5.62 5.57 5.09   RBC 3.57* 3.41* 3.27*   HGB 10.9* 10.2* 9.9*   HCT 35.0* 33.8* 32.4*   PLT 89* 113* 119*   MCV 98 99* 99*   MCH 30.5 29.9 30.3   MCHC 31.1* 30.2* 30.6*     CMP:   Recent Labs   Lab 02/01/20 0226 02/02/20 0400 02/03/20  0653   GLU 95 116* 80   CALCIUM 8.8 8.8 8.8   ALBUMIN 2.9* 2.7* 2.7*   PROT 6.5 6.3 6.3    141 142   K 4.0 3.3* 3.3*   CO2 20* 23 20*    111* 114*   BUN 22 21 16   CREATININE 1.9* 1.9* 1.7*   ALKPHOS 71 75 69   ALT 17 16 14   AST 20 17 14     Labs within the past 24 hours have been reviewed.

## 2020-02-03 NOTE — SUBJECTIVE & OBJECTIVE
Interval History: NAEON. Patient comfortable this AM. Understands plan for home IV ABx and urology follow up. Without complaints.     Review of Systems   Constitutional: Negative for chills and fever.   HENT: Negative for congestion and trouble swallowing.    Eyes: Negative for visual disturbance.   Respiratory: Positive for cough (improved). Negative for shortness of breath and wheezing.    Cardiovascular: Negative for chest pain, palpitations and leg swelling.   Gastrointestinal: Negative for abdominal pain, nausea and vomiting.   Genitourinary: Positive for frequency. Negative for difficulty urinating, dysuria and hematuria.   Musculoskeletal: Negative for arthralgias and myalgias.   Skin: Negative for rash.   Neurological: Negative for dizziness and light-headedness.   Psychiatric/Behavioral: Negative for agitation and confusion.     Objective:     Vital Signs (Most Recent):  Temp: 98.5 °F (36.9 °C) (02/03/20 1131)  Pulse: 69 (02/03/20 1131)  Resp: 18 (02/03/20 1131)  BP: 123/75 (02/03/20 1131)  SpO2: 98 % (02/03/20 1131) Vital Signs (24h Range):  Temp:  [96 °F (35.6 °C)-98.5 °F (36.9 °C)] 98.5 °F (36.9 °C)  Pulse:  [63-72] 69  Resp:  [15-18] 18  SpO2:  [96 %-98 %] 98 %  BP: (123-155)/(75-95) 123/75     Weight: 66.8 kg (147 lb 4.3 oz)  Body mass index is 20.54 kg/m².    Intake/Output Summary (Last 24 hours) at 2/3/2020 1327  Last data filed at 2/3/2020 0646  Gross per 24 hour   Intake 640 ml   Output 500 ml   Net 140 ml      Physical Exam   Constitutional: He is oriented to person, place, and time. No distress.   HENT:   Head: Normocephalic and atraumatic.   Eyes: Right eye exhibits no discharge. Left eye exhibits no discharge. No scleral icterus.   Neck: Normal range of motion. No JVD present.   Cardiovascular: Normal rate and regular rhythm.   Pulmonary/Chest: Effort normal and breath sounds normal.   Abdominal: Soft. He exhibits no distension.   Musculoskeletal: He exhibits no edema or tenderness.    Neurological: He is alert and oriented to person, place, and time.   Skin: Skin is warm and dry. He is not diaphoretic.   Vitals reviewed.      Significant Labs:   CBC:   Recent Labs   Lab 02/02/20  0400 02/03/20  0653   WBC 5.57 5.09   HGB 10.2* 9.9*   HCT 33.8* 32.4*   * 119*     CMP:   Recent Labs   Lab 02/02/20  0400 02/03/20  0653    142   K 3.3* 3.3*   * 114*   CO2 23 20*   * 80   BUN 21 16   CREATININE 1.9* 1.7*   CALCIUM 8.8 8.8   PROT 6.3 6.3   ALBUMIN 2.7* 2.7*   BILITOT 0.3 0.3   ALKPHOS 75 69   AST 17 14   ALT 16 14   ANIONGAP 7* 8   EGFRNONAA 35.2* 40.3*       Significant Imaging: I have reviewed all pertinent imaging results/findings within the past 24 hours.

## 2020-02-03 NOTE — ASSESSMENT & PLAN NOTE
Encounter for Monitoring Immunosuppression post Transplant  Recent Labs   Lab 02/01/20  0835 02/02/20  0400 02/03/20  0653   Tacrolimus Lvl 3.3 L 4.9 L 6.7   *Target level for Alin is 4-6  Plan/Rec:  - Continue current Prograf dose 3/2  - Continue Prednisone 5 mg   - Hold MMF until antibiotic therapy is completed.   - Recheck as per guidelines.  - Monitor for side effects and toxicities, given narrow therapeutic window and significant risk of AE

## 2020-02-03 NOTE — CONSULTS
[facility]  Infectious Disease  Consult Note    Patient Name: Alin Burkett  MRN: [id number]  Admission Date: 1/31/2020  Hospital Length of Stay: 3 days  Attending Physician: Dameon Anderson MD  Primary Care Provider: Carmen Krueger MD     Isolation Status: Droplet    Patient information was obtained from patient and ER records.      Inpatient consult to Infectious Diseases  Consult performed by: Ella Kaur DO  Consult ordered by: Kae Medellin MD        Assessment/Plan:     Bacteremia  70M PMH KTx 2016 who presented w/ URI symptoms, fevers, found to have + UA, urine cx + E. Coli, blood cultures + E. Coli. Repeat blood cultures on 2/2 NGTD. Patient clinically improved, antibiotics have been de-escalated to ceftriaxone. ID consulted for discharge recs.    Recommendations:  - continue ceftriaxone 2g daily x 14 days from first negative blood culture (EOT 2/16/2020)  - check weekly CBC and CMP while on IV antibiotics - fax to [phone]    Will sign off. Please call back w/ questions.         Thank you for your consult. I will sign off. Please contact us if you have any additional questions.      Danitza Kaur DO  Transplant ID  Infectious Disease Fellow  C: [phone]  P: [phone]      Subjective:     Principal Problem: Acute kidney injury superimposed on chronic kidney disease    HPI: 70M hx ESRD s/p KTx 2016 who presented w/ fevers, cough, sore throat on 1/31. UA +, UCX + E. Coli. Blood cultures on admission + E. Coli. Has a hx of urethral stricture, but denies symptoms since dilation in the fall 2019. Follows w/ Dr. Mann. Repeat blood cx on 2/1 NGTD. RIP + rhinovirus. ID consulted for discharge antibiotic recommendations.     Past Medical History:   Diagnosis Date    Acidosis     Adrenal adenoma     Anemia associated with chronic renal failure     Arrhythmia, onset 1995 5/1/2015    Awaiting organ transplant status 11/26/2013    Basal cell carcinoma 06/12/2012    left nasal tip     Blood type B+ 2013    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  Renal allograft biopsy 17 (DIVINE): 21 glomeruli, none globally sclerosed, <5% interstitial fibrosis, no ACR, c4d negative, AVR CCT Type 2 (V1 lesion); plan THYMO     Dissecting aortic aneurysm (any part), abdominal     Diverticulosis     Encounter for blood transfusion     ESRD (end stage renal disease) 2010    H/O urethral stricture 2018    H/O: urethral stricture     History of AAA (abdominal aortic aneurysm) repair     History of urethral stricture 2018    Hypertension     Hypokalemia     Hypothyroidism 1/10/2014    Inguinal hernia bilateral, non-recurrent     Kidney stones     Organ transplant candidate 2013    Plantar warts 1/10/2014    Recurrent UTI 2017    S/P kidney transplant     Secondary hyperparathyroidism, renal     Thyroid disease        Past Surgical History:   Procedure Laterality Date    ABDOMINAL SURGERY      exploratory lapatomy x 2    ABLATION N/A 2019    Procedure: ABLATION, SVT;  Surgeon: Emerson Mcmanus MD;  Location: [facility] EP LAB;  Service: Cardiology;  Laterality: N/A;  SVT, RFA, CARTO, anes, GP, 323    AORTA - SUPERIOR MESENTERIC ARTERY BYPASS GRAFT      BLADDER NECK RECONSTRUCTION      BLADDER SURGERY      COLONOSCOPY  10/10/2013    Dr. Gutierrez, repeat in 5 years    CYSTOSCOPY      CYSTOSCOPY N/A 2018    Procedure: CYSTOSCOPY;  Surgeon: Dewey Mann MD;  Location: [facility] OR [address] [place] [address];  Service: Urology;  Laterality: N/A;  45 min    CYSTOURETHROSCOPY WITH DIRECT VISION INTERNAL URETHROTOMY N/A 2018    Procedure: CYSTOSCOPY, WITH DIRECT VISION INTERNAL URETHROTOMY;  Surgeon: Dewey DUMONT  MD Johnathan;  [facility];  Service: Urology;  Laterality: N/A;    DILATION OF URETHRA N/A 12/19/2018    Procedure: DILATION, URETHRA;  Surgeon: Dewey Mann MD;  Location: [facility] OR 1ST FLR;  Service: Urology;  Laterality: N/A;    FLEXIBLE CYSTOSCOPY N/A 11/6/2019    Procedure: CYSTOSCOPY, FLEXIBLE;  Surgeon: Dewey Mann MD;  Location: [facility] OR 2ND FLR;  Service: Urology;  Laterality: N/A;    GASTROJEJUNOSTOMY      HEMORRHOID SURGERY      HERNIA REPAIR      KIDNEY TRANSPLANT      LEFT HEART CATHETERIZATION Left 8/20/2019    Procedure: Left heart cath;  Surgeon: Javan Oscar MD;  Location: [facility] CATH/EP LAB;  Service: Cardiology;  Laterality: Left;    LITHOTRIPSY      PERCUTANEOUS NEPHROLITHOTRIPSY      right  ESWL  10/31/12    right ESWL  6/26/12    URETHROPLASTY USING PATCH GRAFT N/A 11/6/2019    Procedure: URETHROPLASTY, USING PATCH GRAFT BUCCAL MUCOSA GRAFT;  Surgeon: Dewey Mann MD;  Location: [address];  Service: Urology;  Laterality: N/A;  3 HOURS       Review of patient's allergies indicates:  No Known Allergies    Medications:  Medications Prior to Admission   Medication Sig    aspirin (ECOTRIN) 81 MG EC tablet Take 1 tablet (81 mg total) by mouth once daily.    calcitRIOL (ROCALTROL) 0.5 MCG Cap Take 1 capsule (0.5 mcg total) by mouth once daily.    famotidine (PEPCID) 20 MG tablet Take 1 tablet (20 mg total) by mouth every evening.    ketoconazole (NIZORAL) 200 mg Tab Take 0.5 tablets (100 mg total) by mouth once daily.    levothyroxine (SYNTHROID) 100 MCG tablet Take 1 tablet (100 mcg total) by mouth once daily.    magnesium oxide (MAG-OX) 400 mg (241.3 mg magnesium) tablet Take 1 tablet (400 mg total) by mouth once daily.    metoprolol tartrate (LOPRESSOR) 25 MG tablet Take 0.5 tablets (12.5 mg total) by mouth 2 (two) times daily.    multivitamin (ONE DAILY MULTIVITAMIN) per tablet Take 1 tablet by mouth once daily.    pep injection Inject 0.25 ml as  directed.   May increase by 0.05 ml such as 0.30, 0.35 ml etc. Up to 1.00 ml Until adequate result is achieved.    For compounding pharmacy use:   Add PAPAVERINE 30 mcg  Add PHENTOLAMINE 10 mg  Add ALPROSTADIL 100 mcg    predniSONE (DELTASONE) 5 MG tablet Take 1 tablet (5 mg total) by mouth once daily. Z94.0/Kidney transplant on 11/26/2016    sodium bicarbonate 650 MG tablet Take 1 tablet (650 mg total) by mouth 2 (two) times daily.    tacrolimus (PROGRAF) 1 MG Cap Take 3 capsules (3 mg total) by mouth every morning AND 2 capsules (2 mg total) every evening. Z94.0/Kidney Transplant on 11/26/16.     Antibiotics (From admission, onward)    Start     Stop Route Frequency Ordered    02/03/20 1200  cefTRIAXone (ROCEPHIN) 2 g in dextrose 5 % 50 mL IVPB      -- IV Every 24 hours (non-standard times) 02/03/20 1000        Antifungals (From admission, onward)    Start     Stop Route Frequency Ordered    02/01/20 0900  ketoconazole split tablet 100 mg      -- Oral Daily 01/31/20 2154        Antivirals (From admission, onward)    None           Immunization History   Administered Date(s) Administered    Influenza - High Dose - PF (65 years and older) 11/29/2017, 10/03/2018    Influenza - Trivalent - Adjuvanted - PF 10/03/2018, 09/19/2019    Pneumococcal Conjugate - 13 Valent 10/03/2018, 09/19/2019    Tdap 08/24/2018       Family History     Problem Relation (Age of Onset)    Alcohol abuse Father    Alzheimer's disease Mother    Cancer Brother    Colon cancer Brother    Diabetes Mother    HIV Brother    Kidney disease Paternal Uncle, Cousin    No Known Problems Sister, Daughter, Sister, Brother, Brother    Stroke Maternal Aunt        Social History     Socioeconomic History    Marital status: Significant Other     Spouse name: Not on file    Number of children: Not on file    Years of education: Not on file    Highest education level: Not on file   Occupational History     Employer: Disabled   Social Needs     Financial resource strain: Not on file    Food insecurity:     Worry: Not on file     Inability: Not on file    Transportation needs:     Medical: Not on file     Non-medical: Not on file   Tobacco Use    Smoking status: Former Smoker     Packs/day: 0.50     Years: 40.00     Pack years: 20.00     Types: Cigarettes     Last attempt to quit: 2010     Years since quittin.6    Smokeless tobacco: Never Used   Substance and Sexual Activity    Alcohol use: Yes     Comment: occasional/social    Drug use: No     Comment: THC in youth    Sexual activity: Yes     Partners: Female     Birth control/protection: None   Lifestyle    Physical activity:     Days per week: Not on file     Minutes per session: Not on file    Stress: Not on file   Relationships    Social connections:     Talks on phone: Not on file     Gets together: Not on file     Attends Bahai service: Not on file     Active member of club or organization: Not on file     Attends meetings of clubs or organizations: Not on file     Relationship status: Not on file   Other Topics Concern    Not on file   Social History Narrative    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   HENT: Negative for congestion, dental problem, ear pain and rhinorrhea.    Eyes: Negative for pain and discharge.   Respiratory: Negative for cough and shortness of breath.    Cardiovascular: Negative for chest pain and leg swelling.   Gastrointestinal: Negative for abdominal distention, abdominal pain, constipation, diarrhea, nausea and vomiting.   Genitourinary: Negative for difficulty urinating and dysuria.   Musculoskeletal: Negative for arthralgias, back pain and joint swelling.   Skin: Negative for rash and wound.   Allergic/Immunologic: Positive for immunocompromised state.   Neurological: Negative for dizziness, light-headedness and headaches.    Psychiatric/Behavioral: Negative for behavioral problems and confusion.     Objective:     Vital Signs (Most Recent):  Temp: 98.2 °F (36.8 °C) (02/03/20 1559)  Pulse: 66 (02/03/20 1559)  Resp: 18 (02/03/20 1559)  BP: 133/82 (02/03/20 1559)  SpO2: 97 % (02/03/20 1559) Vital Signs (24h Range):  Temp:  [96 °F (35.6 °C)-98.5 °F (36.9 °C)] 98.2 °F (36.8 °C)  Pulse:  [63-71] 66  Resp:  [15-18] 18  SpO2:  [96 %-98 %] 97 %  BP: (123-155)/(75-95) 133/82     Weight: 66.8 kg (147 lb 4.3 oz)  Body mass index is 20.54 kg/m².    Estimated Creatinine Clearance: 38.7 mL/min (A) (based on SCr of 1.7 mg/dL (H)).    Physical Exam   Constitutional: He is oriented to person, place, and time. He appears well-developed and well-nourished. No distress.   HENT:   Head: Atraumatic.   Right Ear: External ear normal.   Left Ear: External ear normal.   Nose: Nose normal.   Mouth/Throat: Oropharynx is clear and moist.   Eyes: EOM are normal.   Neck: Normal range of motion. Neck supple.   Cardiovascular: Normal rate, regular rhythm and normal heart sounds.   No murmur heard.  Pulmonary/Chest: Effort normal and breath sounds normal. No respiratory distress. He has no wheezes.   Abdominal: Soft. Bowel sounds are normal. He exhibits no distension. There is no tenderness.   Musculoskeletal: Normal range of motion. He exhibits no edema or deformity.   Neurological: He is alert and oriented to person, place, and time. No cranial nerve deficit.   Skin: Skin is warm and dry. No rash noted. He is not diaphoretic. No erythema.   Psychiatric: He has a normal mood and affect. His behavior is normal.       Significant Labs:   CBC:   Recent Labs   Lab 02/02/20  0400 02/03/20  0653   WBC 5.57 5.09   HGB 10.2* 9.9*   HCT 33.8* 32.4*   * 119*     CMP:   Recent Labs   Lab 02/02/20  0400 02/03/20  0653    142   K 3.3* 3.3*   * 114*   CO2 23 20*   * 80   BUN 21 16   CREATININE 1.9* 1.7*   CALCIUM 8.8 8.8   PROT 6.3 6.3   ALBUMIN 2.7* 2.7*    BILITOT 0.3 0.3   ALKPHOS 75 69   AST 17 14   ALT 16 14   ANIONGAP 7* 8   EGFRNONAA 35.2* 40.3*     Microbiology Results (last 7 days)     Procedure Component Value Units Date/Time    Blood culture x two cultures. Draw prior to antibiotics. [[id number]]  (Abnormal)  (Susceptibility) Collected:  01/31/20 1809    Order Status:  Completed Specimen:  Blood from Peripheral, Forearm, Left Updated:  02/03/20 1024     Blood Culture, Routine Gram stain eleonora bottle: Gram negative rods       Results called to and read back by:Berkley Salazar RN  02/01/2020        08:52      Gram stain aer bottle: Gram negative rods       Positive results previously called 02/01/2020  15:54      ESCHERICHIA COLI    Narrative:       Aerobic and anaerobic    Urine culture [[id number]]  (Abnormal)  (Susceptibility) Collected:  01/31/20 2152    Order Status:  Completed Specimen:  Urine Updated:  02/03/20 0228     Urine Culture, Routine ESCHERICHIA COLI  >100,000 cfu/ml      Narrative:       Preferred Collection Type->Urine, Clean Catch    Blood culture [[id number]] Collected:  02/02/20 1743    Order Status:  Completed Specimen:  Blood Updated:  02/03/20 0115     Blood Culture, Routine No Growth to date    Blood culture [[id number]] Collected:  02/02/20 1743    Order Status:  Completed Specimen:  Blood Updated:  02/03/20 0115     Blood Culture, Routine No Growth to date    Urine culture [[id number]] Collected:  02/01/20 1504    Order Status:  Completed Specimen:  Urine Updated:  02/02/20 2355     Urine Culture, Routine No growth    Narrative:       Preferred Collection Type->Urine, Clean Catch    Blood culture [[id number]] Collected:  02/01/20 1749    Order Status:  Completed Specimen:  Blood Updated:  02/02/20 2012     Blood Culture, Routine No Growth to date      No Growth to date    Blood culture [[id number]] Collected:  02/01/20 1749    Order Status:  Completed Specimen:  Blood Updated:  02/02/20 2012     Blood Culture, Routine No Growth to date       No Growth to date    Blood culture x two cultures. Draw prior to antibiotics. [[id number]] Collected:  01/31/20 1809    Order Status:  Completed Specimen:  Blood from Peripheral, Wrist, Left Updated:  02/02/20 2012     Blood Culture, Routine No Growth to date      No Growth to date      No Growth to date    Narrative:       Aerobic and anaerobic    Group A Strep, Molecular [[id number]] Collected:  02/01/20 1626    Order Status:  Completed Specimen:  Throat Updated:  02/01/20 1801     Group A Strep, Molecular Negative    Group A Strep, Molecular [[id number]] Collected:  02/01/20 1505    Order Status:  Canceled Specimen:  Throat     Respiratory Infection Panel, Nasopharyngeal [[id number]]  (Abnormal) Collected:  01/31/20 2344    Order Status:  Completed Specimen:  Nasopharyngeal Swab Updated:  02/01/20 0643     Respiratory Infection Panel Source NP Swab     Adenovirus Not Detected     Coronavirus 229E Not Detected     Coronavirus HKU1 Not Detected     Coronavirus NL63 Not Detected     Coronavirus OC43 Not Detected     Human Metapneumovirus Not Detected     Human Rhinovirus/Enterovirus Detected     Influenza A (subtypes H1, H1-2009,H3) Not Detected     Influenza B Not Detected     Parainfluenza Virus 1 Not Detected     Parainfluenza Virus 2 Not Detected     Parainfluenza Virus 3 Not Detected     Parainfluenza Virus 4 Not Detected     Respiratory Syncytial Virus Not Detected     Bordetella Parapertussis (HT7341) Not Detected     Bordetella pertussis (ptxP) Not Detected     Chlamydia pneumoniae Not Detected     Mycoplasma pneumoniae Not Detected     Comment: Respiratory Infection Panel testing performed by Multiplex PCR.       Narrative:       For all other respiratory sources order [id number] Respiratory  Viral Panel by PCR (RVPCR)    Culture, Respiratory with Gram Stain [[id number]]     Order Status:  No result Specimen:  Respiratory     Influenza A & B by Molecular [[id number]] Collected:  01/31/20 1809    Order Status:   Completed Specimen:  Nasopharyngeal Swab Updated:  01/31/20 1845     Influenza A, Molecular Negative     Influenza B, Molecular Negative     Flu A & B Source Nasal swab    Urine culture - High Risk **CANNOT BE ORDERED STAT** [[id number]]     Order Status:  No result Specimen:  Urine           Significant Imaging: I have reviewed all pertinent imaging results/findings within the past 24 hours.

## 2020-02-03 NOTE — ASSESSMENT & PLAN NOTE
69 yo M presenting with respiratory symptoms + urinary frequency that is admitted with DIVINE on CKD of a transplanted kidney. 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 S/p kidney transplant in 2016. On tacrolimus (3mg in am, 2mg in pm) + MMF, but he has not taken medication over last few days. DDx includes UTI (UA suggestive of infection), post-renal obstruction (BPH vs urethral stricture), pre-renal azotemia 2/2 dehydration in setting of viral illness, graft dysfunction due to missing recent doses of tacro (less likely).    Kidney transplant ultrasound: increased resistive indices; minimal transplant hydronephrosis improved from prior    Plan:  - Blood and urine cultures growing E. Coli; susceptible to Rocephin   - Stop Zosyn and begin IV Rocephin for total of 2 weeks of therapy. End Date 2/15  - Home health placed for infusion   - Transplant ID consulted and comfortable with therapy. Will follow up labs outpatient. Appreciate assistance   - Will consult midline team. Blood Cx from 2/1 have been NGTD  - Will schedule o/p f/u with urologist   - Holding MMF while bacteremic  - Continue tacrolimus at home dose; therapeutic range 4-6 per KTM  - Continue prednisone 5mg    - Trend Cr  - Strict I&Os  - Avoid nephrotoxic agents  - Renally adjust medications

## 2020-02-03 NOTE — ASSESSMENT & PLAN NOTE
-DIVINE complicating renal transplant, likely from poor po intake and ?SIRS/sepsis. CKD3 s/p kidney transplantation.  - Scr on decreasing trend towards baseline. No need for RRT. Encourage PO intake.    -Strict I/Os, BP (goal <140/90).    -Avoid nephrotoxic agents (NSAIDs, IV contrast dye, ACEI/ARB anti-HTN medications, Aminoglycoside-containing antibiotics)  -Renally dose all appropriate medications.

## 2020-02-03 NOTE — PLAN OF CARE
02/03/20 1221   Post-Acute Status   Post-Acute Authorization Home Health/Hospice   Home Health/Hospice Status Referrals Sent     [facility] nurse Mirta in to teach Pt with spouse once she is off and secure hh provider as Pt has no preferences.

## 2020-02-03 NOTE — TELEPHONE ENCOUNTER
----- Message from Catherine Mejia RN sent at 2/3/2020  2:45 PM CST -----  Requesting hospital follow-up appointment in 1-2 weeks. Patient with complicated UTI due to stricture and Dr. Anderson (hosp medicine staff) requesting appointment sooner(one currently scheduled for March)    Thank you.  Catherine

## 2020-02-03 NOTE — PLAN OF CARE
Discharge planning: Message sent to Dr. Mann's scheduling pool via [facility] requesting Urology f/u appointment as soon as possible.

## 2020-02-03 NOTE — PLAN OF CARE
Problem: Occupational Therapy Goal  Goal: Occupational Therapy Goal  Outcome: Met  Evaluation/Discharge only. No acute OT needs at this time. No goals established. Re-consult if situation changes.    TAYO La  2/3/2020

## 2020-02-03 NOTE — PROGRESS NOTES
Pt placed in droplet precautions due to rhinovirus detected. Explained the procedure to pt along with the reasoning.

## 2020-02-04 PROBLEM — B96.20 E COLI BACTEREMIA: Status: ACTIVE | Noted: 2020-01-01

## 2020-02-04 NOTE — PROGRESS NOTES
Pt discharged from [facility] left the unit ambulatory with his belongings, sister is down stairs waiting on him.

## 2020-02-04 NOTE — PLAN OF CARE
02/04/20 0838   Post-Acute Status   Post-Acute Authorization Home Health/Hospice   Home Health/Hospice Status Referrals Sent     Updated hh orders provided to [facility], Sw to follow with hh provider and teaching. Pt ok to d/c after abx dose given, ride to transport Pt home will arrive at 230pm/3pm per Amany with [facility]. Pt setup with [facility] which is a division of [facility].

## 2020-02-04 NOTE — DISCHARGE SUMMARY
[facility] Hospital Medicine  Discharge Summary      Patient Name: Alin Burkett  MRN: [id number]  Admission Date: 1/31/2020  Hospital Length of Stay: 4 days  Discharge Date and Time:  02/04/2020 1:19 PM  Attending Physician: Dameon Anderson MD   Discharging Provider: Vimal Irizarry MD  Primary Care Provider: Carmen Krueger MD  Hospital Medicine Team: [facility] 1 Vimal Irizarry MD    HPI:   Mr. Burkett is a 70 year old male with ESRD s/p KTx (2016), BPH, urethral stricture s/p stenting, recurrent UTIs (Hx ESBL, VRE), AAA s/p repair, HTN, diverticulosis, and hypothyroidism that presents with productive cough, chills, sore throat, night sweats. Wife at bedside during H/P.    Patient was admitted earlier this month for acute diverticulitis (see Hospital Course below). Course was complicated by UTI that grew pan-sensitive Pseudomonas. He completed cipro x 10 days + flagyl x 7 days. He recovered fully and was back to his baseline until 1/27 when he developed sore throat. Throughout the day, he developed productive cough (swallows sputum so unsure what color). Symptoms have been progressively worse and associated with frontal headaches, chills, and occasional night sweats. He has taken excedrin for headaches with moderate improvement. He has been staying well hydrated but has had decreased appetite. Has had urinary frequency as well. Suffers from frequent UTIs that typical manifest as urethral burning prior to urination. Denies subjective fevers, nausea/vomiting, abdominal pain, dysuria, flank pain. Has diarrhea at baseline without change. Of note, he has missed last 3 doses of tacrolimus (typically takes 3mg in am, 2mg in pm).    Hospital Course from 1/6-1/9:  Patient with kidney transplant and on chronic immunosuppression was admitted for 2 episodes of painless blood per rectum with associated LLQ pain on palpation at home. On arrival he was noted to have positive fecal occult and with unknown  last colonoscopy. He noted no fever chills, nausea, vomiting, confusion, LH, hemoptysis, hematemesis, chest pain, sob, hematuria, dysuria. Over hospital course, he had one additional episode but with stable hb and no leukocytosis. CT without contrast demonstrated diverticulosis with possible diverticulitis. GI consulted and said no intervention at this time was indicated but will follow up outpatient. Patient was started on cipro and flagyll. KTM consulted and recommended continuing patients home immunosuppression regimen. They also noted that patient should have a CT with contrast and 48 hours of IVF thereafter to avoid IDVINE given his kidney transplant history so that the patient can CT with contrast was suspicious for diverticulitis. Of note, he was found to have >100,000 CFU of gram negative bacteria on UCx following a dirty UA. KTM recommended waiting for speciation and sensitivities prior to discharging patient given his immunosuppressed state. Cultures with Pseudomonas sensitive to Ciprofloxacin. Patient to complete 10 day course of Ciprofloxacin and 7 day course of Metronidazole. Patient to resume cellcept after antibiotic completion.       * No surgery found *      Hospital Course:   Admitted to Hospital Medicine for evaluation/management of productive cough, sore throat, fevers. Found to have DIVINE and positive UA for many bacteria, in the setting of multiple prior UTIs and urethral stricture. RIP is positive for Rhinovirus/Enterovirus. CXR clear. No leukocytosis. BCx positive for E. Coli. Being treated for DIVINE 2/2 urosepsis. Evaluated by Kidney Transplant Medicine, tacro level in therapeutic window of 4-6. Holding home MMF until ABx are ended. Treating E. Coli bacteremia with pip-tazo which was deescalated to IV Rocephin on 2/3 after UCx susceptibilities returned. Will need to be on total of 2 weeks of therapy (ending 2/15). Tacrolimus level noted to be elevated to 7.4 on 2/4. Discussed with renal transplant  who recommended decreasing tacro to 2mg BID and repeating lab in one week. Patient has been HD stable during admission. Discharge home with home health for labs and IV Rocephin care with urology appointment scheduled on 2/20/2020.      Consults:   Consults (From admission, onward)        Status Ordering Provider     Inpatient consult to Infectious Diseases  Once     Provider:  (Not yet assigned)    Completed DENNISE PORTER     Inpatient consult to Kidney/Pancreas Transplant Medicine  Once     Provider:  (Not yet assigned)    Completed RAHUL SWEENEY     Inpatient consult to Midline team  Once     Provider:  (Not yet assigned)    Completed RAHUL SWEENEY        Review of Systems   Constitutional: Negative for chills and fever.   HENT: Negative for congestion and trouble swallowing.    Eyes: Negative for visual disturbance.   Respiratory: Positive for cough (improved). Negative for shortness of breath and wheezing.    Cardiovascular: Negative for chest pain, palpitations and leg swelling.   Gastrointestinal: Negative for abdominal pain, nausea and vomiting.   Genitourinary: Positive for frequency. Negative for difficulty urinating, dysuria and hematuria.   Musculoskeletal: Negative for arthralgias and myalgias.   Skin: Negative for rash.   Neurological: Negative for dizziness and light-headedness.   Psychiatric/Behavioral: Negative for agitation and confusion.     Physical Exam   Constitutional: He is oriented to person, place, and time. No distress.   HENT:   Head: Normocephalic and atraumatic.   Eyes: Right eye exhibits no discharge. Left eye exhibits no discharge. No scleral icterus.   Neck: Normal range of motion. No JVD present.   Cardiovascular: Normal rate and regular rhythm.   Pulmonary/Chest: Effort normal and breath sounds normal.   Abdominal: Soft. He exhibits no distension.   Musculoskeletal: He exhibits no edema or tenderness.   Neurological: He is alert and oriented to person, place, and time.   Skin: Skin is  warm and dry. He is not diaphoretic.   Vitals reviewed.      * Acute kidney injury superimposed on chronic kidney disease  69 yo M presenting with respiratory symptoms + urinary frequency that is admitted with DIVINE on CKD of a transplanted kidney. 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 S/p kidney transplant in 2016. On tacrolimus (3mg in am, 2mg in pm) + MMF, but he has not taken medication over last few days. DDx includes UTI (UA suggestive of infection), post-renal obstruction (BPH vs urethral stricture), pre-renal azotemia 2/2 dehydration in setting of viral illness, graft dysfunction due to missing recent doses of tacro (less likely).    Kidney transplant ultrasound: increased resistive indices; minimal transplant hydronephrosis improved from prior    Plan:  - Blood and urine cultures growing E. Coli; susceptible to Rocephin   - Stop Zosyn and begin IV Rocephin for total of 2 weeks of therapy. End Date 2/15  - Home health placed for infusion   - Transplant ID consulted and comfortable with therapy. Will follow up labs outpatient. Appreciate assistance   - Will consult midline team. Blood Cx from  have been NGTD  - Will schedule o/p f/u with urologist   - Holding MMF while bacteremic  - Continue tacrolimus 2mg BID with therapeutic range 4-6 per KTM  - Continue prednisone 5mg    - Trend Cr  - Strict I&Os  - Avoid nephrotoxic agents  - Renally adjust medications    Immunocompromised state        Pyelonephritis, acute        Bacteremia  BCx and UCx both positive for E. Coli    Viral URI with cough  - RIP panel positive for rhinovirus/enterovirus  - Continue supportive care    Recurrent UTI        Stage 3 chronic kidney disease        -donor kidney transplant        Immunosuppressive management encounter following kidney transplant        Acquired hypothyroidism  Continue home levothyroxine      Essential hypertension  Continue home Lopressor      Final Active  Diagnoses:    Diagnosis Date Noted POA    PRINCIPAL PROBLEM:  Acute kidney injury superimposed on chronic kidney disease [N17.9, N18.9] 2020 Yes    Immunocompromised state [D89.9]  Yes    Pyelonephritis, acute [N10]  Yes    Viral URI with cough [J06.9, B97.89] 2020 Yes    Bacteremia [R78.81] 2020 Unknown    Recurrent UTI [N39.0] 2017 Yes    Stage 3 chronic kidney disease [N18.3] 2017 Yes     Chronic    -donor kidney transplant [Z94.0]  Not Applicable    Immunosuppressive management encounter following kidney transplant [Z79.899, Z94.0] 2016 Not Applicable     Chronic    Acquired hypothyroidism [E03.9] 01/10/2014 Yes    Essential hypertension [I10]  Yes     Chronic      Problems Resolved During this Admission:       Discharged Condition: stable    Disposition:     Follow Up:  Follow-up Information     Aura Gray MD On 2020.    Specialty:  Urology  Why:  appointment 3:00pm  Contact information:  [address] [place] [id number]  [phone]                 Patient Instructions:      TACROLIMUS LEVEL   Standing Status: Future Standing Exp. Date: 21     Ambulatory Referral to Urology   Referral Priority: Routine Referral Type: Consultation   Referral Reason: Specialty Services Required   Referred to Provider: AURA GRAY Requested Specialty: Urology   Number of Visits Requested: 1     Ambulatory referral/consult to Transplant, Kidney   Standing Status: Future   Referral Priority: Routine Referral Type: Transplants   Number of Visits Requested: 1       Significant Diagnostic Studies: Labs:   CMP   Recent Labs   Lab 20  0653 20  0427    141   K 3.3* 3.7   * 111*   CO2 20* 24   GLU 80 74   BUN 16 15   CREATININE 1.7* 1.5*   CALCIUM 8.8 9.1   PROT 6.3 6.7   ALBUMIN 2.7* 2.8*   BILITOT 0.3 0.2   ALKPHOS 69 69   AST 14 20   ALT 14 18   ANIONGAP 8 6*   ESTGFRAFRICA 46.5* 54.1*   EGFRNONAA 40.3* 46.8*   , CBC   Recent Labs    Lab 02/03/20  0653 02/04/20  0427   WBC 5.09 5.42   HGB 9.9* 10.6*   HCT 32.4* 34.3*   * 164    and Tacrolimus level 7.4 on day of discharge    Pending Diagnostic Studies:     None         Medications:  Reconciled Home Medications:      Medication List      START taking these medications    cefTRIAXone 1 gram injection  Commonly known as:  ROCEPHIN  Inject 2 g into the vein once daily. (END 2/15/2020) for 12 days        CHANGE how you take these medications    mycophenolate 250 mg Cap  Commonly known as:  CELLCEPT  Take 3 capsules (750 mg total) by mouth 2 (two) times daily. Z94.0/Kidney transplant on 11/26/16. HOLD UNTIL ANTIBIOTICS IS COMPLETE; RESUME 2/16/2020  Start taking on:  February 16, 2020  What changed:    · additional instructions  · These instructions start on February 16, 2020. If you are unsure what to do until then, ask your doctor or other care provider.     tacrolimus 1 MG Cap  Commonly known as:  PROGRAF  Take 2 capsules (2 mg total) by mouth every 12 (twelve) hours. Z94.0/Kidney Transplant on 11/26/16  What changed:  See the new instructions.        CONTINUE taking these medications    aspirin 81 MG EC tablet  Commonly known as:  ECOTRIN  Take 1 tablet (81 mg total) by mouth once daily.     calcitRIOL 0.5 MCG Cap  Commonly known as:  ROCALTROL  Take 1 capsule (0.5 mcg total) by mouth once daily.     famotidine 20 MG tablet  Commonly known as:  PEPCID  Take 1 tablet (20 mg total) by mouth every evening.     ketoconazole 200 mg Tab  Commonly known as:  NIZORAL  Take 0.5 tablets (100 mg total) by mouth once daily.     levothyroxine 100 MCG tablet  Commonly known as:  Synthroid  Take 1 tablet (100 mcg total) by mouth once daily.     magnesium oxide 400 mg (241.3 mg magnesium) tablet  Commonly known as:  MAG-OX  Take 1 tablet (400 mg total) by mouth once daily.     metoprolol tartrate 25 MG tablet  Commonly known as:  LOPRESSOR  Take 0.5 tablets (12.5 mg total) by mouth 2 (two) times daily.      One Daily Multivitamin per tablet  Generic drug:  multivitamin  Take 1 tablet by mouth once daily.     PEP INJECTION (FOR RX USE)  Inject 0.25 ml as directed.   May increase by 0.05 ml such as 0.30, 0.35 ml etc. Up to 1.00 ml Until adequate result is achieved.    For compounding pharmacy use:   Add PAPAVERINE 30 mcg  Add PHENTOLAMINE 10 mg  Add ALPROSTADIL 100 mcg     predniSONE 5 MG tablet  Commonly known as:  DELTASONE  Take 1 tablet (5 mg total) by mouth once daily. Z94.0/Kidney transplant on 11/26/2016     sodium bicarbonate 650 MG tablet  Take 1 tablet (650 mg total) by mouth 2 (two) times daily.            Indwelling Lines/Drains at time of discharge:   Lines/Drains/Airways     None                 Time spent on the discharge of patient: 45 minutes  Patient was seen and examined on the date of discharge and determined to be suitable for discharge.         Vimal Irizarry MD  Department of Hospital Medicine  [facility]

## 2020-02-04 NOTE — PLAN OF CARE
Meds given as ordered tolerated well. No complaints of pain.No signs or symptoms of distress/discomfort noted.

## 2020-02-04 NOTE — TELEPHONE ENCOUNTER
----- Message from Travis Zimmerman MD sent at 2/4/2020 10:29 AM CST -----  Hi,    He is to be discharged today with IV Rocephin to complete therapy 2/16/2020 via medline . E Coli Bacteremia and pyelonephritis seen by ID. Please  Plan to repeat labs next week   Creatinine at baseline  Patient feels fine

## 2020-02-04 NOTE — ASSESSMENT & PLAN NOTE
71 yo M presenting with respiratory symptoms + urinary frequency that is admitted with DIVINE on CKD of a transplanted kidney. 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 S/p kidney transplant in 2016. On tacrolimus (3mg in am, 2mg in pm) + MMF, but he has not taken medication over last few days. DDx includes UTI (UA suggestive of infection), post-renal obstruction (BPH vs urethral stricture), pre-renal azotemia 2/2 dehydration in setting of viral illness, graft dysfunction due to missing recent doses of tacro (less likely).    Kidney transplant ultrasound: increased resistive indices; minimal transplant hydronephrosis improved from prior    Plan:  - Blood and urine cultures growing E. Coli; susceptible to Rocephin   - Stop Zosyn and begin IV Rocephin for total of 2 weeks of therapy. End Date 2/15  - Home health placed for infusion   - Transplant ID consulted and comfortable with therapy. Will follow up labs outpatient. Appreciate assistance   - Will consult midline team. Blood Cx from 2/1 have been NGTD  - Will schedule o/p f/u with urologist   - Holding MMF while bacteremic  - Continue tacrolimus 2mg BID with therapeutic range 4-6 per KTM  - Continue prednisone 5mg    - Trend Cr  - Strict I&Os  - Avoid nephrotoxic agents  - Renally adjust medications

## 2020-02-04 NOTE — PLAN OF CARE
Educated pt on the importance to protect the PICC line he has in the right upper arm I explained to him that this line enters his arm the lies directly in a large artery near his heart and I t is a portal for infection I explained to him that only a nurse should change the dressing and that he should avoid touching or scratching the area al all means. I described signs and symptoms of infection for him to watch for and to report immediately to his physician such as redness and warmth to the area, drainage or pain and or elevated temp, he voiced his understanding.

## 2020-02-04 NOTE — DISCHARGE INSTRUCTIONS
Per Renal transplant doctors, please start taking your TACROLIMUS 2mg in the morning and then in evening. You will need a repeat tacrolimus level in one week so that the transplant team can evaluate your levels. Follow up with your urologist with your scheduled appointment on 2/20/2020. You will be on IV antibiotics until 2/15/2020. After the antibiotic stops please continue your CellCept.

## 2020-02-04 NOTE — PROGRESS NOTES
Pt   Being discharged, infusion nurse visited and gave instructions, meds will be delivered to his home, pt states his wife was also educated. IV access removed PICC line remains to the left upper arm Discharge instructions reviewed with pt he verbalized his understanding.

## 2020-02-04 NOTE — PROGRESS NOTES
KTM Chart Check    No intake or output data in the 24 hours ending 02/04/20 1447    Vitals:    02/04/20 0002 02/04/20 0401 02/04/20 0707 02/04/20 1200   BP: 120/76 (!) 141/82 133/88 (!) 108/59   BP Location:  Right arm  Right arm   Patient Position: Lying Lying  Lying   Pulse: (!) 59 61 68 62   Resp: 20 17 18 20   Temp: 98.7 °F (37.1 °C) 96.5 °F (35.8 °C) 98.2 °F (36.8 °C) 98.5 °F (36.9 °C)   TempSrc: Oral Oral  Oral   SpO2: 99% 99% 99% 98%   Weight:       Height:           Recent Labs   Lab 02/02/20  0400 02/03/20  0653 02/04/20  0427    142 141   K 3.3* 3.3* 3.7   * 114* 111*   CO2 23 20* 24   BUN 21 16 15   CREATININE 1.9* 1.7* 1.5*   CALCIUM 8.8 8.8 9.1   PHOS 2.1* 2.0* 2.0*       Immunosuppression Level - Prograf level 7.4 (~11 hr trough)  - Recommend Decreasing Prograf to 2 mg bid  - Transplant Nephrologist and coordinator contacted for follow up and scheduling of labs in   1 week.   - Scr on decreasing trend towards baseline ( ~1.2-1.3)  - No acute issues identified.

## 2020-02-05 NOTE — PHYSICIAN QUERY
PT Name: Alin Burkett  MR #: [id number]     PHYSICIAN QUERY -  ELECTROLYTE CLARIFICATION      CDS/: Sweetie SALEEM, RN-BC  Contact information: [email or web address]  This form is a permanent document in the medical record.     Query Date: February 5, 2020    By submitting this query, we are merely seeking further clarification of documentation to reflect the severity of illness of your patient. Please utilize your independent clinical judgment when addressing the question(s) below.    The Medical record reflects the following:     Indicators   Supporting Clinical Findings Location in Medical Record   X Lab Value(s) Potassium 3.3  Phosphorus 2.6-2.0 2/2-2/3 Labs  2/1-2/4   X Treatment                                 Medication potassium chloride CR capsule 30 mEq   potassium, sodium phosphates 280-160-250 mg packet 2 packet x2  2/2 MAR  2/1    Other       Provider, please specify the diagnosis or diagnoses that correspond(s) to the above indicators. Rudy all that apply:    [ x  ] Hypokalemia   [ x  ] Hypophosphatemia   [   ] Other electrolyte disturbance (please specify): _______   [   ]  Clinically Undetermined       Please document in your progress notes daily for the duration of treatment until resolved, and include in your discharge summary.

## 2020-02-05 NOTE — PHYSICIAN QUERY
PT Name: Alin Burkett  MR #: [id number]    Physician Query Form -Systemic Infectious Process Clarification     CDS/: Sweetie SALEEM, RN-BC  Contact information: [email or web address]    This form is a permanent document in the medical record.     Query Date: February 5, 2020     By submitting this query, we are merely seeking further clarification of documentation. Please utilize your independent clinical judgment when addressing the question(s) below.    The Medical record contains the following:     Indicators   Supporting Clinical Findings   Location in Medical Record   X HR RR BP Temp   , 106, 104, 100, 92  RR 18-20   B/P 107/76 and 114/58  T 102.8, 100.1, 99.6  T 101.9 and 101    1/31 VS  1/31 1/31 1/31 2/1   X Lactic Acid             Procalcitonin Lactic acid 1.1-1.9 and procal 1.78 Labs 1/31 and 2/1   X WBC                Bands                     CRP WBC 7.49-5.42 Labs 1/31-2/4   X Culture(s) Blood cx E. Coli  Urine cx  ESCHERICHIA COLI   >100,000 cfu/ml  Labs 1/31    AMS, Confusion, LOC, etc.     X  Organ Dysfunction / Failure DIVINE complicating renal transplant, likely from poor po intake and ?SIRS/sepsis  Transplant Kidney, Consult, Dr. Baker, 2/1/2020   X Bacteremia or Sepsis / Septic Sepsis, due to unspecified organism, unspecified whether acute organ dysfunction present....will cover with broad-spectrum antibiotics as well as test for influenza under sepsis protocol    Bacteremia-BCx and UCx both positive for E. Coli ED provider note, Dr. Ruvalcaba, 1/31/2020          [facility], [place], Dr. Medellin/Dr. Anderson, 2/2/2020   X Known or Suspected Source of Infection documented should treat as HCAP despite negative CXR as he may not be able to mount sufficient leukocyte response to show infiltrate on CXR....UA does show significant pyuria indicating recurrent UTI     Complicated UTI  ED provider note, Dr. Ruvalcaba, 1/31/2020            [facility], [place], Dr. Medellin/Dr. Anderson, 2/2/2020     (Failed) Outpatient Treatment     X Medication ceFEPIme injection 1 g   vancomycin 1750 mg   metronidazole IVPB 500 mg  cefTRIAXone (ROCEPHIN) 2 g   piperacillin-tazobactam 4.5 g  IVPB   ketoconazole split tablet 100 mg MAR 1/31  1/31  1/31  2/3  2/1-2/3  2/1-2/4   X Treatment F/u UCx  - Continue zosyn  - Renal US to evaluate for hydronephrosis  RIP panel  Given immunosuppression and recent hospitalization, will treat for HCAP with [facility], H&P Dr. Villagomez/Dr. Platt, 2/1/2020   X Other  Bilirubin 1.2   Creatinine 1.5-2.0 Lab 1/31 1/31-2/4     Provider, please specify diagnosis or diagnoses associated with above clinical findings.    [ x  ] Sepsis   [   ] Severe Sepsis with Acute Organ Dysfunction/Failure (please specify  organ dysfunction/failure): ___________________   [   ] Other Infectious Disease (please specify): _________________________________   [   ] Other: __________________________________   [  ]  Clinically Undetermined         Please document in your progress notes daily for the duration of treatment until resolved and include in your discharge summary.

## 2020-02-11 NOTE — TELEPHONE ENCOUNTER
Dr. Kaur,   I spoke with Laura from [facility] and she stated that patient EOC is this Saturday 2/15. Patient is curretly on Rocephin 2g q24 and has no follow up appt. You seen this patient in the hospital on 2/3. Please advise.

## 2020-02-13 NOTE — TELEPHONE ENCOUNTER
----- Message from Chasidy Mcclain MD sent at 2/12/2020  2:15 PM CST -----  Please prograf to 3/2 mg

## 2020-02-13 NOTE — TELEPHONE ENCOUNTER
Notified patient of Dr. Mcclain's review of 2/11/20 lab results via [facility] message.     Hey Akash Kwadwo,     Dr. Mcclain reviewed your 2/11/20 lab results. Your Prograf level was slightly lower than it should be. She wants you to please increase your Prograf dose to 3mg in AM & 2mg in PM; repeat labs as scheduled 2/20/20.    Have a nice day!    Lisa     Follow-up message will be sent to patient to verify he received the message.

## 2020-02-14 NOTE — TELEPHONE ENCOUNTER
----- Message from Antwon Alfaro sent at 2/14/2020 10:12 AM CST -----  Contact: pt  Calling to speak with coordinator regarding his arm    Call back: [phone]

## 2020-02-20 NOTE — H&P (VIEW-ONLY)
CHIEF COMPLAINT:    Mr. Burkett is a 70 y.o. male presenting for evaluation of E coli urosepsis.  Had urethroplasty on 11/6/19.    Alin Burkett is a 70 y.o. male with a PMH of urethral stricture, s/p transplant kidney, ED.  Hx of bladder diverticulum.  Procedure(s) Performed: 11/6/19  Excision and primary anastomosis urethroplasty   Cystoscopy   Findings:   - 3 cm bulbar urethral stricture repaired by excision and primary anastomosis     He is here today with ingrid-catheter RUG.  RUG on 11/25/19  The urethra was filled with contrast in a retrograde fashion through catheter tip syringe inserted adjacent to the Barker catheter.  The urethral lumen is normal in size.  Proximal penile/bulbous urethra is unremarkable with normal pericatheter contrast flow.  We were unable to reflux contrast into the bladder.  Prostatic urethra not visualized.  No evidence of extraluminal contrast to indicate urethral rupture or fistula.    s/p cysto and urethral dilation for anterior urethral stricture on 12/17.  Bladder diverticulum was also seen on cysto.     He has a history of self-cath with 16 Fr catheter occasionally but has not does this in over 3 months.  His stream used to be good while he was doing CICs, but he reports occasional trickling. He also occasionally passes some debris that may be stones.     He had a kidney transplant in 11/ 2016       REVIEW OF SYSTEMS:    Constitutional: Negative for fever and chills.   HENT: Negative for hearing loss.   Eyes: Negative for visual disturbance.   Respiratory: Negative for shortness of breath.   Cardiovascular: Negative for chest pain.   Gastrointestinal: Negative for vomiting, and constipation.   Genitourinary: See HPI  Neurological: Negative for dizziness.   Hematological: Does not bruise/bleed easily.   Psychiatric/Behavioral: Negative for confusion.       PATIENT HISTORY:    Past Medical History:   Diagnosis Date    Acidosis     Adrenal adenoma     Anemia associated with  chronic renal failure     Arrhythmia, onset 2015    Awaiting organ transplant status 2013    Basal cell carcinoma 2012    left nasal tip    Blood type B+ 2013    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  Renal allograft biopsy 17 (DIVINE): 21 glomeruli, none globally sclerosed, <5% interstitial fibrosis, no ACR, c4d negative, AVR CCT Type 2 (V1 lesion); plan THYMO     Dissecting aortic aneurysm (any part), abdominal     Diverticulosis     Encounter for blood transfusion     ESRD (end stage renal disease) 2010    H/O urethral stricture 2018    H/O: urethral stricture     History of AAA (abdominal aortic aneurysm) repair     History of urethral stricture 2018    Hypertension     Hypokalemia     Hypothyroidism 1/10/2014    Inguinal hernia bilateral, non-recurrent     Kidney stones     Organ transplant candidate 2013    Plantar warts 1/10/2014    Recurrent UTI 2017    S/P kidney transplant     Secondary hyperparathyroidism, renal     Thyroid disease        Past Surgical History:   Procedure Laterality Date    ABDOMINAL SURGERY      exploratory lapatomy x 2    ABLATION N/A 2019    Procedure: ABLATION, SVT;  Surgeon: Emerson Mcmanus MD;  Location: [facility] EP LAB;  Service: Cardiology;  Laterality: N/A;  SVT, RFA, CARTO, anes, GP, 323    AORTA - SUPERIOR MESENTERIC ARTERY BYPASS GRAFT      BLADDER NECK RECONSTRUCTION      BLADDER SURGERY      COLONOSCOPY  10/10/2013    Dr. Gutierrez, repeat in 5 years    CYSTOSCOPY      CYSTOSCOPY N/A 2018    Procedure: CYSTOSCOPY;  Surgeon: Dewey Mann MD;  [facility];  Service: Urology;  Laterality: N/A;  45 min     CYSTOURETHROSCOPY WITH DIRECT VISION INTERNAL URETHROTOMY N/A 12/19/2018    Procedure: CYSTOSCOPY, WITH DIRECT VISION INTERNAL URETHROTOMY;  Surgeon: Dewey Mann MD;  [facility];  Service: Urology;  Laterality: N/A;    DILATION OF URETHRA N/A 12/19/2018    Procedure: DILATION, URETHRA;  Surgeon: Dewey Mann MD;  [facility];  Service: Urology;  Laterality: N/A;    FLEXIBLE CYSTOSCOPY N/A 11/6/2019    Procedure: CYSTOSCOPY, FLEXIBLE;  Surgeon: Dewey Mann MD;  Location: [facility] OR 2ND FLR;  Service: Urology;  Laterality: N/A;    GASTROJEJUNOSTOMY      HEMORRHOID SURGERY      HERNIA REPAIR      KIDNEY TRANSPLANT      LEFT HEART CATHETERIZATION Left 8/20/2019    Procedure: Left heart cath;  Surgeon: Javan Oscar MD;  Location: [facility] CATH/EP LAB;  Service: Cardiology;  Laterality: Left;    LITHOTRIPSY      PERCUTANEOUS NEPHROLITHOTRIPSY      right  ESWL  10/31/12    right ESWL  6/26/12    URETHROPLASTY USING PATCH GRAFT N/A 11/6/2019    Procedure: URETHROPLASTY, USING PATCH GRAFT BUCCAL MUCOSA GRAFT;  Surgeon: Dewey Mann MD;  Location: [facility] OR [address] [place] [address];  Service: Urology;  Laterality: N/A;  3 HOURS       Family History   Problem Relation Age of Onset    Diabetes Mother     Alzheimer's disease Mother     Alcohol abuse Father     HIV Brother     Stroke Maternal Aunt     Kidney disease Paternal Uncle     Kidney disease Cousin     No Known Problems Sister     No Known Problems Daughter     No Known Problems Sister     No Known Problems Brother     No Known Problems Brother     Cancer Brother         thyroid cancer    Colon cancer Brother     Melanoma Neg Hx     Psoriasis Neg Hx     Lupus Neg Hx     Eczema Neg Hx     Colon polyps Neg Hx     Crohn's disease Neg Hx     Ulcerative colitis Neg Hx     Celiac disease Neg Hx        Social History     Socioeconomic History    Marital status: Significant Other     Spouse name: Not on file    Number of  children: Not on file    Years of education: Not on file    Highest education level: Not on file   Occupational History     Employer: Disabled   Social Needs    Financial resource strain: Not on file    Food insecurity:     Worry: Not on file     Inability: Not on file    Transportation needs:     Medical: Not on file     Non-medical: Not on file   Tobacco Use    Smoking status: Former Smoker     Packs/day: 0.50     Years: 40.00     Pack years: 20.00     Types: Cigarettes     Last attempt to quit: 2010     Years since quittin.6    Smokeless tobacco: Never Used   Substance and Sexual Activity    Alcohol use: Yes     Comment: occasional/social    Drug use: No     Comment: THC in youth    Sexual activity: Yes     Partners: Female     Birth control/protection: None   Lifestyle    Physical activity:     Days per week: Not on file     Minutes per session: Not on file    Stress: Not on file   Relationships    Social connections:     Talks on phone: Not on file     Gets together: Not on file     Attends Voodoo service: Not on file     Active member of club or organization: Not on file     Attends meetings of clubs or organizations: Not on file     Relationship status: Not on file   Other Topics Concern    Not on file   Social History Narrative    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    Medications:    Current Outpatient Medications:     aspirin (ECOTRIN) 81 MG EC tablet, Take 1 tablet (81 mg total) by mouth once daily., Disp: , Rfl: 0    calcitRIOL (ROCALTROL) 0.5 MCG Cap, Take 1 capsule (0.5 mcg total) by mouth once daily., Disp: 30 capsule, Rfl: 11    famotidine (PEPCID) 20 MG tablet, Take 1 tablet (20 mg total) by mouth every evening., Disp: 90 tablet, Rfl: 3    ketoconazole (NIZORAL) 200 mg Tab, Take 0.5 tablets (100 mg total) by mouth once daily., Disp: 45 tablet, Rfl: 3    levothyroxine (SYNTHROID) 100 MCG tablet, Take 1  tablet (100 mcg total) by mouth once daily., Disp: 90 tablet, Rfl: 3    magnesium oxide (MAG-OX) 400 mg (241.3 mg magnesium) tablet, Take 1 tablet (400 mg total) by mouth once daily., Disp: 90 tablet, Rfl: 3    metoprolol tartrate (LOPRESSOR) 25 MG tablet, Take 0.5 tablets (12.5 mg total) by mouth 2 (two) times daily., Disp: 90 tablet, Rfl: 3    multivitamin (ONE DAILY MULTIVITAMIN) per tablet, Take 1 tablet by mouth once daily., Disp: , Rfl:     mycophenolate (CELLCEPT) 250 mg Cap, Take 3 capsules (750 mg total) by mouth 2 (two) times daily. Z94.0/Kidney transplant on 11/26/16. HOLD UNTIL ANTIBIOTICS IS COMPLETE; RESUME 2/16/2020, Disp: 180 capsule, Rfl: 11    pep injection, Inject 0.25 ml as directed.  May increase by 0.05 ml such as 0.30, 0.35 ml etc. Up to 1.00 ml Until adequate result is achieved.  For compounding pharmacy use:  Add PAPAVERINE 30 mcg Add PHENTOLAMINE 10 mg Add ALPROSTADIL 100 mcg, Disp: 1 vial, Rfl: 2    predniSONE (DELTASONE) 5 MG tablet, Take 1 tablet (5 mg total) by mouth once daily. Z94.0/Kidney transplant on 11/26/2016, Disp: 90 tablet, Rfl: 3    sodium bicarbonate 650 MG tablet, Take 1 tablet (650 mg total) by mouth 2 (two) times daily., Disp: 540 tablet, Rfl: 3    tacrolimus (PROGRAF) 1 MG Cap, Take 3 capsules (3 mg total) by mouth every morning AND 2 capsules (2 mg total) every evening. Z94.0/Kidney Transplant on 11/26/16., Disp: 150 capsule, Rfl: 11    PHYSICAL EXAMINATION:    Constitutional: He appears well-developed and well-nourished.  He is in no apparent distress.    Eyes: No scleral icterus noted bilaterally. No discharge bilaterally.    Nose: No rhinorrhea    Cardiovascular: Normal rate.  No pitting edema noted in lower extremities bilaterally    Pulmonary/Chest: Effort normal. No respiratory distress.     Abdominal:  He exhibits no distension.  There is no CVA tenderness.     Lymphadenopathy:        Right: No supraclavicular adenopathy present.        Left: No  supraclavicular adenopathy present.     Neurological: He is alert and oriented to person, place, and time.     Skin: Skin is warm and dry.     Psych: Cooperative with normal affect.    Genitourinary: The penis is circumcised.     Physical Exam      LABS:    Lab Results   Component Value Date    PSA 0.41 10/18/2016    PSA 0.32 10/20/2015    PSA 0.45 2014     RUG 10/15/19  There is a 1.3 cm stricture at the proximal penile urethra.  This stricture measures around 3 mm at its most narrow diameter.  There are no areas of contrast extravasation to suggest leak.  No mass or soft tissue inflammation surrounding the visualized urethra.    Post evacuation imaging is unremarkable.    UA clear with positive leukocytes    IMPRESSION:    E coli bacteremia  -     Urine culture  -     Cystoscopy; Future    -donor kidney transplant    Bladder diverticulum  -     Cystoscopy; Future    Immunocompromised state    Recurrent UTI  -     Urine culture  -     Cystoscopy; Future      PLAN:  Reports that he voids well with no restrictive flow.  Feels he is emptying his bladder.  But he may not empty bladder   Recommend Probiotics daily.  May consider daily suppressive abx given his immune suppressed state.  Will further evaluate him with cath PVR and cysto at the same time.  Urine culture today.  I spent 25 minutes with the patient of which more than half was spent in direct consultation with the patient in regards to our treatment and plan.      Follow up:  Follow up for Cysto.

## 2020-02-20 NOTE — PROGRESS NOTES
CHIEF COMPLAINT:    Mr. Burkett is a 70 y.o. male presenting for evaluation of E coli urosepsis.  Had urethroplasty on 11/6/19.    Alin Burkett is a 70 y.o. male with a PMH of urethral stricture, s/p transplant kidney, ED.  Hx of bladder diverticulum.  Procedure(s) Performed: 11/6/19  Excision and primary anastomosis urethroplasty   Cystoscopy   Findings:   - 3 cm bulbar urethral stricture repaired by excision and primary anastomosis     He is here today with ingrid-catheter RUG.  RUG on 11/25/19  The urethra was filled with contrast in a retrograde fashion through catheter tip syringe inserted adjacent to the Barker catheter.  The urethral lumen is normal in size.  Proximal penile/bulbous urethra is unremarkable with normal pericatheter contrast flow.  We were unable to reflux contrast into the bladder.  Prostatic urethra not visualized.  No evidence of extraluminal contrast to indicate urethral rupture or fistula.    s/p cysto and urethral dilation for anterior urethral stricture on 12/17.  Bladder diverticulum was also seen on cysto.     He has a history of self-cath with 16 Fr catheter occasionally but has not does this in over 3 months.  His stream used to be good while he was doing CICs, but he reports occasional trickling. He also occasionally passes some debris that may be stones.     He had a kidney transplant in 11/ 2016       REVIEW OF SYSTEMS:    Constitutional: Negative for fever and chills.   HENT: Negative for hearing loss.   Eyes: Negative for visual disturbance.   Respiratory: Negative for shortness of breath.   Cardiovascular: Negative for chest pain.   Gastrointestinal: Negative for vomiting, and constipation.   Genitourinary: See HPI  Neurological: Negative for dizziness.   Hematological: Does not bruise/bleed easily.   Psychiatric/Behavioral: Negative for confusion.       PATIENT HISTORY:    Past Medical History:   Diagnosis Date    Acidosis     Adrenal adenoma     Anemia associated with  chronic renal failure     Arrhythmia, onset 2015    Awaiting organ transplant status 2013    Basal cell carcinoma 2012    left nasal tip    Blood type B+ 2013    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  Renal allograft biopsy 17 (DIVINE): 21 glomeruli, none globally sclerosed, <5% interstitial fibrosis, no ACR, c4d negative, AVR CCT Type 2 (V1 lesion); plan THYMO     Dissecting aortic aneurysm (any part), abdominal     Diverticulosis     Encounter for blood transfusion     ESRD (end stage renal disease) 2010    H/O urethral stricture 2018    H/O: urethral stricture     History of AAA (abdominal aortic aneurysm) repair     History of urethral stricture 2018    Hypertension     Hypokalemia     Hypothyroidism 1/10/2014    Inguinal hernia bilateral, non-recurrent     Kidney stones     Organ transplant candidate 2013    Plantar warts 1/10/2014    Recurrent UTI 2017    S/P kidney transplant     Secondary hyperparathyroidism, renal     Thyroid disease        Past Surgical History:   Procedure Laterality Date    ABDOMINAL SURGERY      exploratory lapatomy x 2    ABLATION N/A 2019    Procedure: ABLATION, SVT;  Surgeon: Emerson Mcmanus MD;  Location: [facility] EP LAB;  Service: Cardiology;  Laterality: N/A;  SVT, RFA, CARTO, anes, GP, 323    AORTA - SUPERIOR MESENTERIC ARTERY BYPASS GRAFT      BLADDER NECK RECONSTRUCTION      BLADDER SURGERY      COLONOSCOPY  10/10/2013    Dr. Gutierrez, repeat in 5 years    CYSTOSCOPY      CYSTOSCOPY N/A 2018    Procedure: CYSTOSCOPY;  Surgeon: Dewey Mann MD;  [facility];  Service: Urology;  Laterality: N/A;  45 min     CYSTOURETHROSCOPY WITH DIRECT VISION INTERNAL URETHROTOMY N/A 12/19/2018    Procedure: CYSTOSCOPY, WITH DIRECT VISION INTERNAL URETHROTOMY;  Surgeon: Dewey Mann MD;  [facility];  Service: Urology;  Laterality: N/A;    DILATION OF URETHRA N/A 12/19/2018    Procedure: DILATION, URETHRA;  Surgeon: Dewey Mann MD;  [facility];  Service: Urology;  Laterality: N/A;    FLEXIBLE CYSTOSCOPY N/A 11/6/2019    Procedure: CYSTOSCOPY, FLEXIBLE;  Surgeon: Dewey Mann MD;  Location: [facility] OR 2ND FLR;  Service: Urology;  Laterality: N/A;    GASTROJEJUNOSTOMY      HEMORRHOID SURGERY      HERNIA REPAIR      KIDNEY TRANSPLANT      LEFT HEART CATHETERIZATION Left 8/20/2019    Procedure: Left heart cath;  Surgeon: Javan Oscar MD;  Location: [facility] CATH/EP LAB;  Service: Cardiology;  Laterality: Left;    LITHOTRIPSY      PERCUTANEOUS NEPHROLITHOTRIPSY      right  ESWL  10/31/12    right ESWL  6/26/12    URETHROPLASTY USING PATCH GRAFT N/A 11/6/2019    Procedure: URETHROPLASTY, USING PATCH GRAFT BUCCAL MUCOSA GRAFT;  Surgeon: Dewey Mann MD;  Location: [facility] OR [address] [place] [address];  Service: Urology;  Laterality: N/A;  3 HOURS       Family History   Problem Relation Age of Onset    Diabetes Mother     Alzheimer's disease Mother     Alcohol abuse Father     HIV Brother     Stroke Maternal Aunt     Kidney disease Paternal Uncle     Kidney disease Cousin     No Known Problems Sister     No Known Problems Daughter     No Known Problems Sister     No Known Problems Brother     No Known Problems Brother     Cancer Brother         thyroid cancer    Colon cancer Brother     Melanoma Neg Hx     Psoriasis Neg Hx     Lupus Neg Hx     Eczema Neg Hx     Colon polyps Neg Hx     Crohn's disease Neg Hx     Ulcerative colitis Neg Hx     Celiac disease Neg Hx        Social History     Socioeconomic History    Marital status: Significant Other     Spouse name: Not on file    Number of  children: Not on file    Years of education: Not on file    Highest education level: Not on file   Occupational History     Employer: Disabled   Social Needs    Financial resource strain: Not on file    Food insecurity:     Worry: Not on file     Inability: Not on file    Transportation needs:     Medical: Not on file     Non-medical: Not on file   Tobacco Use    Smoking status: Former Smoker     Packs/day: 0.50     Years: 40.00     Pack years: 20.00     Types: Cigarettes     Last attempt to quit: 2010     Years since quittin.6    Smokeless tobacco: Never Used   Substance and Sexual Activity    Alcohol use: Yes     Comment: occasional/social    Drug use: No     Comment: THC in youth    Sexual activity: Yes     Partners: Female     Birth control/protection: None   Lifestyle    Physical activity:     Days per week: Not on file     Minutes per session: Not on file    Stress: Not on file   Relationships    Social connections:     Talks on phone: Not on file     Gets together: Not on file     Attends Caodaism service: Not on file     Active member of club or organization: Not on file     Attends meetings of clubs or organizations: Not on file     Relationship status: Not on file   Other Topics Concern    Not on file   Social History Narrative    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    Medications:    Current Outpatient Medications:     aspirin (ECOTRIN) 81 MG EC tablet, Take 1 tablet (81 mg total) by mouth once daily., Disp: , Rfl: 0    calcitRIOL (ROCALTROL) 0.5 MCG Cap, Take 1 capsule (0.5 mcg total) by mouth once daily., Disp: 30 capsule, Rfl: 11    famotidine (PEPCID) 20 MG tablet, Take 1 tablet (20 mg total) by mouth every evening., Disp: 90 tablet, Rfl: 3    ketoconazole (NIZORAL) 200 mg Tab, Take 0.5 tablets (100 mg total) by mouth once daily., Disp: 45 tablet, Rfl: 3    levothyroxine (SYNTHROID) 100 MCG tablet, Take 1  tablet (100 mcg total) by mouth once daily., Disp: 90 tablet, Rfl: 3    magnesium oxide (MAG-OX) 400 mg (241.3 mg magnesium) tablet, Take 1 tablet (400 mg total) by mouth once daily., Disp: 90 tablet, Rfl: 3    metoprolol tartrate (LOPRESSOR) 25 MG tablet, Take 0.5 tablets (12.5 mg total) by mouth 2 (two) times daily., Disp: 90 tablet, Rfl: 3    multivitamin (ONE DAILY MULTIVITAMIN) per tablet, Take 1 tablet by mouth once daily., Disp: , Rfl:     mycophenolate (CELLCEPT) 250 mg Cap, Take 3 capsules (750 mg total) by mouth 2 (two) times daily. Z94.0/Kidney transplant on 11/26/16. HOLD UNTIL ANTIBIOTICS IS COMPLETE; RESUME 2/16/2020, Disp: 180 capsule, Rfl: 11    pep injection, Inject 0.25 ml as directed.  May increase by 0.05 ml such as 0.30, 0.35 ml etc. Up to 1.00 ml Until adequate result is achieved.  For compounding pharmacy use:  Add PAPAVERINE 30 mcg Add PHENTOLAMINE 10 mg Add ALPROSTADIL 100 mcg, Disp: 1 vial, Rfl: 2    predniSONE (DELTASONE) 5 MG tablet, Take 1 tablet (5 mg total) by mouth once daily. Z94.0/Kidney transplant on 11/26/2016, Disp: 90 tablet, Rfl: 3    sodium bicarbonate 650 MG tablet, Take 1 tablet (650 mg total) by mouth 2 (two) times daily., Disp: 540 tablet, Rfl: 3    tacrolimus (PROGRAF) 1 MG Cap, Take 3 capsules (3 mg total) by mouth every morning AND 2 capsules (2 mg total) every evening. Z94.0/Kidney Transplant on 11/26/16., Disp: 150 capsule, Rfl: 11    PHYSICAL EXAMINATION:    Constitutional: He appears well-developed and well-nourished.  He is in no apparent distress.    Eyes: No scleral icterus noted bilaterally. No discharge bilaterally.    Nose: No rhinorrhea    Cardiovascular: Normal rate.  No pitting edema noted in lower extremities bilaterally    Pulmonary/Chest: Effort normal. No respiratory distress.     Abdominal:  He exhibits no distension.  There is no CVA tenderness.     Lymphadenopathy:        Right: No supraclavicular adenopathy present.        Left: No  supraclavicular adenopathy present.     Neurological: He is alert and oriented to person, place, and time.     Skin: Skin is warm and dry.     Psych: Cooperative with normal affect.    Genitourinary: The penis is circumcised.     Physical Exam      LABS:    Lab Results   Component Value Date    PSA 0.41 10/18/2016    PSA 0.32 10/20/2015    PSA 0.45 2014     RUG 10/15/19  There is a 1.3 cm stricture at the proximal penile urethra.  This stricture measures around 3 mm at its most narrow diameter.  There are no areas of contrast extravasation to suggest leak.  No mass or soft tissue inflammation surrounding the visualized urethra.    Post evacuation imaging is unremarkable.    UA clear with positive leukocytes    IMPRESSION:    E coli bacteremia  -     Urine culture  -     Cystoscopy; Future    -donor kidney transplant    Bladder diverticulum  -     Cystoscopy; Future    Immunocompromised state    Recurrent UTI  -     Urine culture  -     Cystoscopy; Future      PLAN:  Reports that he voids well with no restrictive flow.  Feels he is emptying his bladder.  But he may not empty bladder   Recommend Probiotics daily.  May consider daily suppressive abx given his immune suppressed state.  Will further evaluate him with cath PVR and cysto at the same time.  Urine culture today.  I spent 25 minutes with the patient of which more than half was spent in direct consultation with the patient in regards to our treatment and plan.      Follow up:  Follow up for Cysto.

## 2020-03-05 NOTE — PROCEDURES
Procedure Date:  03/05/2020      Procedure:  Male Diagnostic Cystourethroscopy    Pre-op diagnosis: pseudomonas UTI  Post-op diagnosis: normal cysto  Anesthesia: Local  Surgeon:  Dewey Mann MD    Findings:  Urethra:  Normal urethra. S/p urethroplasty.  Wide open urethra with no recurrent stricture  Sphincter: competent.  Prostate: Estimated Length Prostatic Urethra: s/p TURP with open prostate fossa.   Bladder neck: patent with no stricture  Bladder:  Normal bladder.  His transplant kidney located on the right side.  Its ureteral orifice is located in the right upper bladder wall.  Positive bladder diverticulum in the right lower lateral wall.  No stone or other lesions seen.  Normal ureteral orifices bilaterally.   Moderate trabeculation with early sacculation.     Description of Procedure:                                                         Informed Consent:                                                            - Risks, benefits and alternatives of procedure discussed with               patient and informed consent obtained.       Patient Position:   - Supine. --- Bladder ---   Prep and Drape:   - Patient prepped and draped in usual sterile fashion using povidone     iodine (Betadine).   Instruments:   - 16 Fr flexible cystoscope with 0 degree lens.   Procedure Details:   - Cystoscope passed under vision into bladder.   - Bladder and urethra examined in their entirety with findings as     above.     Conclusion:  1. Bladder diverticulum  Suspect this may be the source for recurrent UTI in this pt with immune suppressed state.  Recommend to take Probiotics daily.    2. Left groin lymphadenopathy.  He can follow up with transplant team.  Keflex 500 mg TID for 3 days given.    Plan:  Patient was discharged home in a stable condition.  Medications: cipro  Follow up:  1 year

## 2020-03-05 NOTE — PATIENT INSTRUCTIONS
What to Expect After a Cystoscopy  For the next 24-48 hours, you may feel a mild burning when you urinate. This burning is normal and expected. Drink plenty of water to dilute the urine to help relieve the burning sensation. You may also see a small amount of blood in your urine after the procedure.    Unless you are already taking antibiotics, you may be given an antibiotic after the test to prevent infection.    Signs and Symptoms to Report  Call the [facility] at [phone] if you develop any of the following:  · Fever of 101 degrees or higher  · Chills or persistent bleeding  · Inability to urinate

## 2020-03-24 PROBLEM — R59.0 INGUINAL ADENOPATHY: Status: ACTIVE | Noted: 2020-01-01

## 2020-03-24 NOTE — TELEPHONE ENCOUNTER
----- Message from Julia Veloz sent at 3/24/2020  8:18 AM CDT -----  Contact: pt  Patient calling to speak with coordinator pt in pain  Pt need advise         Call Back :[phone]

## 2020-03-24 NOTE — TELEPHONE ENCOUNTER
----- Message from Shelly Velasquez sent at 3/24/2020 10:49 AM CDT -----  Contact: pt  Reason: Returning call to Lisa    Communication; [phone]

## 2020-03-24 NOTE — TELEPHONE ENCOUNTER
Coordinator returned patient's call. Patient reports he has pain to his left groin & it feels like a swollen lymph node; the site is also red and tender to touch. Patient asked if he should see Dr. Mann again since he saw him last month for the same issue & was given antibiotics. Advised patient to contact his PCP or urgent care for a possible video visit or contact Urology. Patient reports he will contact Dr. Mann's office 1st and let coordinator know.        Coordinator placed a follow-up call to patient. Patient reports he was able to get a video visit scheduled with Dr. Mann today. Dr. Mann assessed his groin; prescribed Keflex 500mg every 8 hours x 10 days & told him to follow up with transplant team.     Coordinator advised patient to take entire regime of antibiotics & follow-up with his PCP within 2-3 weeks since this is a recurrent issue. Patient verbalized understanding.

## 2020-03-24 NOTE — TELEPHONE ENCOUNTER
----- Message from Natalee Candelario sent at 3/24/2020 11:53 AM CDT -----  Please give pt a call. He stated he is in a lot of pain around his groin area.  He was advise by his transplant team to give Dr. Mann a call about this.  Please advise. [phone]

## 2020-03-24 NOTE — PROGRESS NOTES
The patient location is: home  The chief complaint leading to consultation is: left groin pain with swelling  Visit type: Virtual visit with synchronous audio and video  Total time spent with patient: 11 mins  Each patient to whom he or she provides medical services by telemedicine is:  (1) informed of the relationship between the physician and patient and the respective role of any other health care provider with respect to management of the patient; and (2) notified that he or she may decline to receive medical services by telemedicine and may withdraw from such care at any time.    Notes:   CHIEF COMPLAINT:    Mr. Burkett is a 70 y.o. male presenting for evaluation of E coli urosepsis.  Had urethroplasty on 11/6/19.    Alin Burkett is a 70 y.o. male with a PMH of urethral stricture, s/p transplant kidney, ED.    I did cystoscopic evaluation on 3/5/20  Pre-op diagnosis: pseudomonas UTI  Post-op diagnosis: normal cysto  Findings:  Urethra:  Normal urethra. S/p urethroplasty.  Wide open urethra with no recurrent stricture  Sphincter: competent.  Prostate: Estimated Length Prostatic Urethra: s/p TURP with open prostate fossa.   Bladder neck: patent with no stricture  Bladder:  Normal bladder.  His transplant kidney located on the right side.  Its ureteral orifice is located in the right upper bladder wall.  Positive bladder diverticulum in the right lower lateral wall.  No stone or other lesions seen.  Normal ureteral orifices bilaterally.   Moderate trabeculation with early sacculation.     At the time of his cystoscopic exam, he complained of left groin swelling.  I gave him Keflex 500 mg TID for 3 days and recommended him to follow up with transplant team.  He called me today asking for a visit.  Thus we decided to a virtual visit.  The transplant team recommended to follow up with me rather than seeing the pt at Transplant team.    Hx of bladder diverticulum.  Procedure(s) Performed: 11/6/19  Excision and  primary anastomosis urethroplasty   Cystoscopy   Findings:   - 3 cm bulbar urethral stricture repaired by excision and primary anastomosis    He has a history of self-cath with 16 Fr catheter occasionally but has not does this in over 3 months.  His stream used to be good while he was doing CICs, but he reports occasional trickling. He also occasionally passes some debris that may be stones.     He had a kidney transplant in 2016       REVIEW OF SYSTEMS:    Constitutional: Negative for fever and chills.   HENT: Negative for hearing loss.   Eyes: Negative for visual disturbance.   Respiratory: Negative for shortness of breath.   Cardiovascular: Negative for chest pain.   Gastrointestinal: Negative for vomiting, and constipation.   Genitourinary: See HPI  Neurological: Negative for dizziness.   Hematological: Does not bruise/bleed easily.   Psychiatric/Behavioral: Negative for confusion.       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  Renal allograft biopsy 17 (DIVINE): 21 glomeruli, none globally sclerosed, <5% interstitial fibrosis, no ACR, c4d negative, AVR CCT Type 2 (V1 lesion); plan THYMO     Dissecting aortic aneurysm (any part), abdominal     Diverticulosis     Encounter for blood transfusion     ESRD (end stage renal disease) 2010    H/O urethral stricture 2018    H/O: urethral stricture     History of AAA (abdominal  aortic aneurysm) repair     History of urethral stricture 12/19/2018    Hypertension     Hypokalemia     Hypothyroidism 1/10/2014    Inguinal hernia bilateral, non-recurrent     Kidney stones     Organ transplant candidate 11/26/2013    Plantar warts 1/10/2014    Recurrent UTI 7/28/2017    S/P kidney transplant     Secondary hyperparathyroidism, renal     Thyroid disease        Past Surgical History:   Procedure Laterality Date    ABDOMINAL SURGERY      exploratory lapatomy x 2    ABLATION N/A 8/22/2019    Procedure: ABLATION, SVT;  Surgeon: Emerson Mcmanus MD;  Location: [facility] EP LAB;  Service: Cardiology;  Laterality: N/A;  SVT, RFA, CARTO, anes, GP, 323    AORTA - SUPERIOR MESENTERIC ARTERY BYPASS GRAFT      BLADDER NECK RECONSTRUCTION      BLADDER SURGERY      COLONOSCOPY  10/10/2013    Dr. Gutierrez, repeat in 5 years    CYSTOSCOPY      CYSTOSCOPY N/A 12/19/2018    Procedure: CYSTOSCOPY;  Surgeon: Dewey Mann MD;  [facility];  Service: Urology;  Laterality: N/A;  45 min    CYSTOURETHROSCOPY WITH DIRECT VISION INTERNAL URETHROTOMY N/A 12/19/2018    Procedure: CYSTOSCOPY, WITH DIRECT VISION INTERNAL URETHROTOMY;  Surgeon: Dewey Mann MD;  Location: [facility] OR 1ST FLR;  Service: Urology;  Laterality: N/A;    DILATION OF URETHRA N/A 12/19/2018    Procedure: DILATION, URETHRA;  Surgeon: Dewey Mann MD;  Location: [facility] OR 1ST FLR;  Service: Urology;  Laterality: N/A;    FLEXIBLE CYSTOSCOPY N/A 11/6/2019    Procedure: CYSTOSCOPY, FLEXIBLE;  Surgeon: Dewey Mann MD;  Location: [facility] OR 2ND FLR;  Service: Urology;  Laterality: N/A;    GASTROJEJUNOSTOMY      HEMORRHOID SURGERY      HERNIA REPAIR      KIDNEY TRANSPLANT      LEFT HEART CATHETERIZATION Left 8/20/2019    Procedure: Left heart cath;  Surgeon: Javan Oscar MD;  Location: [facility] CATH/EP LAB;  Service: Cardiology;  Laterality: Left;    LITHOTRIPSY      PERCUTANEOUS NEPHROLITHOTRIPSY      right  ESWL   10/31/12    right ESWL  12    URETHROPLASTY USING PATCH GRAFT N/A 2019    Procedure: URETHROPLASTY, USING PATCH GRAFT BUCCAL MUCOSA GRAFT;  Surgeon: Dewey Mann MD;  Location: [facility] OR [address] [place] [address];  Service: Urology;  Laterality: N/A;  3 HOURS       Family History   Problem Relation Age of Onset    Diabetes Mother     Alzheimer's disease Mother     Alcohol abuse Father     HIV Brother     Stroke Maternal Aunt     Kidney disease Paternal Uncle     Kidney disease Cousin     No Known Problems Sister     No Known Problems Daughter     No Known Problems Sister     No Known Problems Brother     No Known Problems Brother     Cancer Brother         thyroid cancer    Colon cancer Brother     Melanoma Neg Hx     Psoriasis Neg Hx     Lupus Neg Hx     Eczema Neg Hx     Colon polyps Neg Hx     Crohn's disease Neg Hx     Ulcerative colitis Neg Hx     Celiac disease Neg Hx        Social History     Socioeconomic History    Marital status: Significant Other     Spouse name: Not on file    Number of children: Not on file    Years of education: Not on file    Highest education level: Not on file   Occupational History     Employer: Disabled   Social Needs    Financial resource strain: Not on file    Food insecurity:     Worry: Not on file     Inability: Not on file    Transportation needs:     Medical: Not on file     Non-medical: Not on file   Tobacco Use    Smoking status: Former Smoker     Packs/day: 0.50     Years: 40.00     Pack years: 20.00     Types: Cigarettes     Last attempt to quit: 2010     Years since quittin.7    Smokeless tobacco: Never Used   Substance and Sexual Activity    Alcohol use: Yes     Comment: occasional/social    Drug use: No     Comment: THC in youth    Sexual activity: Yes     Partners: Female     Birth control/protection: None   Lifestyle    Physical activity:     Days per week: Not on file     Minutes per session: Not on file    Stress: Not on file    Relationships    Social connections:     Talks on phone: Not on file     Gets together: Not on file     Attends Anglican service: Not on file     Active member of club or organization: Not on file     Attends meetings of clubs or organizations: Not on file     Relationship status: Not on file   Other Topics Concern    Not on file   Social History Narrative    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    Medications:    Current Outpatient Medications:     aspirin (ECOTRIN) 81 MG EC tablet, Take 1 tablet (81 mg total) by mouth once daily., Disp: , Rfl: 0    calcitRIOL (ROCALTROL) 0.5 MCG Cap, Take 1 capsule (0.5 mcg total) by mouth once daily., Disp: 30 capsule, Rfl: 11    cephALEXin (KEFLEX) 500 MG capsule, Take 1 capsule (500 mg total) by mouth every 8 (eight) hours. for 10 days, Disp: 30 capsule, Rfl: 0    famotidine (PEPCID) 20 MG tablet, TAKE 1 TABLET EVERY EVENING, Disp: 90 tablet, Rfl: 3    ketoconazole (NIZORAL) 200 mg Tab, TAKE ONE-HALF (1/2) TABLET ONCE DAILY, Disp: 45 tablet, Rfl: 3    levothyroxine (SYNTHROID) 100 MCG tablet, TAKE 1 TABLET DAILY, Disp: 90 tablet, Rfl: 3    magnesium oxide (MAG-OX) 400 mg (241.3 mg magnesium) tablet, Take 1 tablet (400 mg total) by mouth once daily., Disp: 90 tablet, Rfl: 3    metoprolol tartrate (LOPRESSOR) 25 MG tablet, TAKE ONE-HALF (1/2) TABLET TWICE A DAY, Disp: 90 tablet, Rfl: 3    multivitamin (ONE DAILY MULTIVITAMIN) per tablet, Take 1 tablet by mouth once daily., Disp: , Rfl:     mycophenolate (CELLCEPT) 250 mg Cap, Take 3 capsules (750 mg total) by mouth 2 (two) times daily. Z94.0/Kidney transplant on 11/26/16. HOLD UNTIL ANTIBIOTICS IS COMPLETE; RESUME 2/16/2020, Disp: 180 capsule, Rfl: 11    pep injection, Inject 0.25 ml as directed.  May increase by 0.05 ml such as 0.30, 0.35 ml etc. Up to 1.00 ml Until adequate result is achieved.  For compounding pharmacy use:  Add PAPAVERINE 30 mcg Add  PHENTOLAMINE 10 mg Add ALPROSTADIL 100 mcg, Disp: 1 vial, Rfl: 2    predniSONE (DELTASONE) 5 MG tablet, TAKE 1 TABLET ONCE DAILY (KIDNEY TRANSPLANT ON 11/26/2016), Disp: 90 tablet, Rfl: 3    sodium bicarbonate 650 MG tablet, Take 1 tablet (650 mg total) by mouth 2 (two) times daily., Disp: 540 tablet, Rfl: 3    tacrolimus (PROGRAF) 1 MG Cap, Take 3 capsules (3 mg total) by mouth every morning AND 2 capsules (2 mg total) every evening. Z94.0/Kidney Transplant on 11/26/16., Disp: 150 capsule, Rfl: 11    Current Facility-Administered Medications:     doxycycline tablet 100 mg, 100 mg, Oral, Once, Dewey Mann MD, Stopped at 03/05/20 1119    PHYSICAL EXAMINATION:    Constitutional: He appears well-developed and well-nourished.  He is in no apparent distress.    Eyes: No scleral icterus noted bilaterally. No discharge bilaterally.    Nose: No rhinorrhea    Cardiovascular: Normal rate.  No pitting edema noted in lower extremities bilaterally    Pulmonary/Chest: Effort normal. No respiratory distress.     Abdominal:  He exhibits no distension.  There is no CVA tenderness.     Lymphadenopathy:        Right: No supraclavicular adenopathy present.        Left: No supraclavicular adenopathy present.     Neurological: He is alert and oriented to person, place, and time.     Skin: Skin is warm and dry.     Psych: Cooperative with normal affect.    Genitourinary: The penis is circumcised.     Physical Exam      LABS:    Lab Results   Component Value Date    PSA 0.41 10/18/2016    PSA 0.32 10/20/2015    PSA 0.45 11/04/2014     RUG 10/15/19  There is a 1.3 cm stricture at the proximal penile urethra.  This stricture measures around 3 mm at its most narrow diameter.  There are no areas of contrast extravasation to suggest leak.  No mass or soft tissue inflammation surrounding the visualized urethra.    Post evacuation imaging is unremarkable.    UA clear with positive leukocytes    IMPRESSION:    Left groin pain  -      cephALEXin (KEFLEX) 500 MG capsule; Take 1 capsule (500 mg total) by mouth every 8 (eight) hours. for 10 days  Dispense: 30 capsule; Refill: 0    Inguinal adenopathy  -     cephALEXin (KEFLEX) 500 MG capsule; Take 1 capsule (500 mg total) by mouth every 8 (eight) hours. for 10 days  Dispense: 30 capsule; Refill: 0      PLAN:  Left groin swelling is most likely due to inguinal lymphadenopathy, which is likely resulted from the cut he developed on the left LE.  He reports that the cut is all healed.  Will retreat him with Keflex for 10 days. If not getting better within a , he has to follow up with Kidney Transplant team.    This is not urologic problems in origin.    I spent 25 minutes with the patient of which more than half was spent in direct consultation with the patient in regards to our treatment and plan.      Follow up transplant team.    Follow up:  Follow up transplant team.

## 2020-04-06 NOTE — TELEPHONE ENCOUNTER
----- Message from Shelly Velasquez sent at 4/6/2020 11:08 AM CDT -----  Contact: pt  Reason: Pt calling to speak with coordinator pertaining to tacrolimus (PROGRAF) 1 MG Cap    Communication: [phone]

## 2020-04-06 NOTE — TELEPHONE ENCOUNTER
Returned patient's call. Patient request medication refill for Prograf & Cellcept be sent to [facility].

## 2020-05-19 NOTE — TELEPHONE ENCOUNTER
----- Message from Gely Tavera MA sent at 5/19/2020  2:19 PM CDT -----  Contact:  [phone]   Pt calling for a refill request on antibiotics for pain and swelling in the groin (lymph nodes) sames problem as he had in March.   [phone]      [facility] [address] [place] [address]. [phone] (Phone)  [phone] (Fax)

## 2020-05-20 NOTE — TELEPHONE ENCOUNTER
----- Message from Glory Taylor sent at 5/20/2020 10:04 AM CDT -----  Contact: pt: [phone]  Pt is calling to speak with coord       Please contact pt: [phone]

## 2020-05-22 NOTE — TELEPHONE ENCOUNTER
----- Message from Chasidy Mcclain MD sent at 5/22/2020  9:40 AM CDT -----  Is he symptomatic for UTI?

## 2020-05-22 NOTE — TELEPHONE ENCOUNTER
"Patient contacted to see if he is having any symptoms due to abnomral UA. Denies having any issues with urination, denies fever.  States however on his left groin side, an "enlarged lymph node",  states third time in 3 months, has taken antimitotics each time, but keeps returning. He states he has an apt with ID in a couple weeks.  "

## 2020-05-26 NOTE — PROGRESS NOTES
2 patients identifiers verified. Specimen collected. Covid test complete. Patient tolerated well.

## 2020-06-02 PROBLEM — N18.9 ACUTE KIDNEY INJURY SUPERIMPOSED ON CHRONIC KIDNEY DISEASE: Status: RESOLVED | Noted: 2020-01-01 | Resolved: 2020-01-01

## 2020-06-02 PROBLEM — N39.0 RECURRENT UTI: Status: RESOLVED | Noted: 2017-07-28 | Resolved: 2020-01-01

## 2020-06-02 PROBLEM — N39.0 COMPLICATED UTI (URINARY TRACT INFECTION): Status: RESOLVED | Noted: 2020-01-01 | Resolved: 2020-01-01

## 2020-06-02 PROBLEM — N17.9 ACUTE KIDNEY INJURY SUPERIMPOSED ON CHRONIC KIDNEY DISEASE: Status: RESOLVED | Noted: 2020-01-01 | Resolved: 2020-01-01

## 2020-06-02 PROBLEM — B96.20 E COLI BACTEREMIA: Status: RESOLVED | Noted: 2020-01-01 | Resolved: 2020-01-01

## 2020-06-02 PROBLEM — R78.81 E COLI BACTEREMIA: Status: RESOLVED | Noted: 2020-01-01 | Resolved: 2020-01-01

## 2020-06-02 NOTE — LETTER
June 2, 2020      Chasidy Mcclain MD  [address]  [facility] [address]           [facility] Infectious Diseases  [address]  [facility] [address]  Phone: [phone]  Fax: [phone]          Patient: Alin Burkett   MR Number: [id number]   YOB: 1950   Date of Visit: 6/2/2020       Dear Dr. Chasidy Mcclain:    Thank you for referring Alin Burkett to me for evaluation. Attached you will find relevant portions of my assessment and plan of care.    If you have questions, please do not hesitate to call me. I look forward to following Alin Burkett along with you.    Sincerely,    Nohemi Collins MD    Enclosure  CC:  No Recipients    If you would like to receive this communication electronically, please contact [email or web address] or [phone] to request more information on [employer] Link access.    For providers and/or their staff who would like to refer a patient to [facility], please contact us through our one-stop-shop provider referral line, [facility], at [phone].    If you feel you have received this communication in error or would no longer like to receive these types of communications, please e-mail [email or web address]

## 2020-06-02 NOTE — PROGRESS NOTES
Subjective:      Chief Complaint: Recurrent left lymph node swelling    History of Present Illness  70 y.o. male with a past medical history of HTN, hypothyroidism, congenital absence of left kidney, ESRD s/p kidney transplant 11/26/2016 (on tacro, [facility]) c/b AVR s/p thymo 1/2017, uretheral stricture, BPH, bladder diverticulum s/p urethral dilation 7/6, presents for evaluation of recurrent left lymph node swelling.    Patient reports in mid-February, he was at a flea market in [place]. He was visiting a vendor and was bitten by the vendor's small dog. It cut through his pants and to the skin but did not draw any blood. Patient noted this dog appeared to be well cared for and was not concerned about potential for rabies.  He is not able to trace the vendor or dog for confirmation that the dog is still healthy.  Afterwards, he developed a painful lymph node in his left groin.  He was given 14 days of cephalexin with resolution in lymph node swelling. A couple of days after completion of antibiotics, the lymph node started enlarging again. After about a week, the lymph node continued to grow in size and became more painful.  He was given a second course of cephalexin for 14 days with decrease in the swelling of the lymph node.  Two days after stopping antibiotics, swelling returned, patient was restarted on another 14 day course of cephalexin.  Patient has just completed his third course of cephalexin with resolution in lymph node swelling.    Patient reports he has received a TDaP 2018.    Patient denies any fevers, chills, sweats, cough, SOB, CP, nvd, abdominal pain, hematuria, dysuria.    Patient denies any recent travel. Patient lives in [place], [place].  Patient has two dogs at home  Denied having any outdoor hobbies, no recent outdoor activities.    Review of Systems   Constitutional: Negative for chills, diaphoresis, fever and weight loss.   HENT: Negative for congestion, sinus pain and sore throat.    Eyes:  Negative for photophobia and pain.   Respiratory: Negative for cough, sputum production and shortness of breath.    Cardiovascular: Negative for chest pain and leg swelling.   Gastrointestinal: Negative for abdominal pain, diarrhea, nausea and vomiting.   Genitourinary: Negative for dysuria and hematuria.   Musculoskeletal: Negative for joint pain.   Skin: Negative for rash.   Neurological: Negative for focal weakness and headaches.   Psychiatric/Behavioral: Negative for depression. The patient is not nervous/anxious.          Objective:   Physical Exam   Constitutional: He is oriented to person, place, and time. He appears well-developed and well-nourished. No distress.   HENT:   Head: Normocephalic and atraumatic.   Eyes: Conjunctivae and EOM are normal.   Neck: Normal range of motion. Neck supple.   Pulmonary/Chest: Effort normal. No respiratory distress.   Abdominal: Soft. He exhibits no distension.   Musculoskeletal: Normal range of motion. He exhibits no edema.   Left lateral mid leg with scarring from prior dog bite. No surrounding redness, swelling, erythema.   Neurological: He is alert and oriented to person, place, and time.   Skin: Skin is warm and dry. No rash noted. He is not diaphoretic. No erythema.   Psychiatric: He has a normal mood and affect. His behavior is normal.   Vitals reviewed.        Significant labs and imaging reviewed:    Component      Latest Ref Rng & Units 5/21/2020             WBC      3.90 - 12.70 K/uL 8.85   RBC      4.60 - 6.20 M/uL 4.37 (L)   Hemoglobin      14.0 - 18.0 g/dL 13.0 (L)   Hematocrit      40.0 - 54.0 % 43.0   MCV      82 - 98 fL 98   MCH      27.0 - 31.0 pg 29.7   MCHC      32.0 - 36.0 g/dL 30.2 (L)   RDW      11.5 - 14.5 % 15.3 (H)   Platelets      150 - 350 K/uL 117 (L)   MPV      9.2 - 12.9 fL 12.5   Lymph #      1.0 - 4.8 K/uL 0.9 (L)   Mono #      0.3 - 1.0 K/uL 0.8   Eos #      0.0 - 0.5 K/uL 0.1   Baso #      0.00 - 0.20 K/uL 0.01   Gran%      38.0 - 73.0 %  79.8 (H)   Lymph%      18.0 - 48.0 % 9.9 (L)   Mono%      4.0 - 15.0 % 8.9   Eosinophil%      0.0 - 8.0 % 1.0   Basophil%      0.0 - 1.9 % 0.1      Ref Range & Units 12d ago   Glucose 70 - 110 mg/dL 82    Sodium 136 - 145 mmol/L 141    Potassium 3.5 - 5.1 mmol/L 3.9    Chloride 95 - 110 mmol/L 108    CO2 23 - 29 mmol/L 25    BUN, Bld 8 - 23 mg/dL 19    Calcium 8.7 - 10.5 mg/dL 9.5    Creatinine 0.5 - 1.4 mg/dL 1.5High     Albumin 3.5 - 5.2 g/dL 3.5    Phosphorus 2.7 - 4.5 mg/dL 2.7    eGFR if African American >60 mL/min/1.73 m^2 54.1Abnormal     eGFR if non African American >60 mL/min/1.73 m^2 46.8Abnormal          Ref Range & Units 12d ago   Specimen UA  Urine, Unspecified    Color, UA Yellow, Straw, Tati Yellow    Appearance, UA Clear HazyAbnormal     pH, UA 5.0 - 8.0 6.0    Specific Gravity, UA 1.005 - 1.030 1.010    Protein, UA Negative Negative    Comment: Recommend a 24 hour urine protein or a urine   protein/creatinine ratio if globulin induced proteinuria is   clinically suspected.    Glucose, UA Negative Negative    Ketones, UA Negative Negative    Bilirubin (UA) Negative Negative    Occult Blood UA Negative 1+Abnormal     Nitrite, UA Negative PositiveAbnormal     Leukocytes, UA Negative 3+Abnormal        Ref Range & Units 12d ago   RBC, UA 0 - 4 /hpf 6High     WBC, UA 0 - 5 /hpf 22High     Bacteria None-Occ /hpf FewAbnormal     Ca Oxalate Hannah, UA None-Moderate Moderate    Amorphous, UA None-Moderate Occasional    Microscopic Comment  SEE COMMENT      Component 12d ago   Urine Culture, Routine Multiple organisms isolated. None in predominance.  Repeat if    Urine Culture, Routine clinically necessary.          Assessment:   70 y.o. male with a past medical history of HTN, hypothyroidism, congenital absence of left kidney, ESRD s/p kidney transplant 11/26/2016 (on tacro, [facility]) c/b AVR s/p thymo 1/2017, uretheral stricture, BPH, bladder diverticulum s/p urethral dilation 7/6, presents for evaluation of  recurrent left lymph node swelling in the setting of recent dog bite on left leg.    Patient reports resolution of left lymph node swelling after completion of 14 day course of cephalexin, but each time he discontinues antibiotics, lymph node swelling recurs.  Given history of recent dog bite, concerning for Bartonella infection - will send off serology and PCR.      Patient already received TDaP in 2018. Dog with owner, low likelihood of rabies infection.    Plan:   - Sent bartonella antibody panel and PCR from serum  - If lymph node swelling recurs, plans for IR biopsy, send for aerobic/anaerobic, fungal, AFB cultures, pathology    Nohemi Collins MD MPH  [facility] [place]

## 2020-06-02 NOTE — TELEPHONE ENCOUNTER
Called IR to schedule the biopsy, no answer left a voice message.    Send a message to the IR  team, to schedule the appointment.

## 2020-06-22 NOTE — TELEPHONE ENCOUNTER
· Called IR to schedule a biopsy, no answer left a voice message to call me back ASAP.    · Please advice

## 2020-06-22 NOTE — TELEPHONE ENCOUNTER
----- Message from Aurora Leana sent at 6/22/2020  2:13 PM CDT -----  Contact: [phone]  Caller says his temp is 101.   Infection is back, since 2 days ago.  Still having pain in the lymph node area.     Wants to be seen asap .   Asking if you can see him tomorrow?  Pls call to discuss this .

## 2020-06-24 NOTE — TELEPHONE ENCOUNTER
MD Lilliana Apple MA   Caller: Unspecified (Today,  9:44 AM)             Okay I put the order in.  Tell him to call [phone] and then schedule a screening close to him within 72 hours of his biopsy.

## 2020-06-24 NOTE — TELEPHONE ENCOUNTER
Reason for Disposition   [1] Caller requesting NON-URGENT health information AND [2] PCP's office is the best resource    Protocols used: INFORMATION ONLY CALL-A-    Pt stated he is scheduled to have a biopsy and he is suppose to have a covid 19 test before hand. Pt stated he hasn't been told when to take covid test and he would like to have it done in [place]. Please call and advice pt [phone]

## 2020-06-26 NOTE — TELEPHONE ENCOUNTER
----- Message from Nohemi Collins MD sent at 6/26/2020 12:28 PM CDT -----  Regarding: RE: Pt Inquiry  Tell him I think if the lump is still there he should still go  ----- Message -----  From: Lilliana Saab MA  Sent: 6/26/2020  12:27 PM CDT  To: Nohemi Collins MD  Subject: FW: Pt Inquiry                                   Please advice  ----- Message -----  From: Saumya Rubio  Sent: 6/26/2020  12:21 PM CDT  To: Karina Song Staff  Subject: Pt Inquiry                                       Pt called advising pain for Biopsy has gone away. Inquiring if procedure should be canceled for 7-30-20.      [phone] ([place])

## 2020-06-29 NOTE — NURSING
Pre-op call complete.     Pre procedure instructions given for lymph node Bx. Pt instructed not to eat or drink after midnight. Allergies reviewed. Daughter, Aslyn to provide transport and monitor pt 8 hrs. Pts daughter's phone broken.  Daughter will have pts cell phone tomorrow.  Pt denied anticoagulation medications. Home medications reviewed with patient. Pt instructed to check in to second floor radiology/lab desk to have blood work drawn at 9:30 am then to proceed to [facility] waiting area. Pt denied insulin use.  Expected length of stay reviewed.  Covid screening complete.  Pt verbalizes understanding. Questions answered.

## 2020-06-30 PROBLEM — R59.1 LYMPHADENOPATHY: Status: ACTIVE | Noted: 2020-01-01

## 2020-06-30 NOTE — PLAN OF CARE
L groin lymph node biopsy completed. NAD noted.  Dressing applied to left groin, CDI.  PT to be transferred to [facility]. Report given at bedside.

## 2020-06-30 NOTE — DISCHARGE SUMMARY
Radiology Discharge Summary      Hospital Course: No complications    Admit Date: 6/30/2020  Discharge Date: 06/30/2020     Instructions Given to Patient: Yes  Diet: Resume prior diet  Activity: activity as tolerated    Description of Condition on Discharge: Stable  Vital Signs (Most Recent): Temp: 98.8 °F (37.1 °C) (06/30/20 1320)  Pulse: 73 (06/30/20 1430)  Resp: 16 (06/30/20 1430)  BP: 116/66 (06/30/20 1430)  SpO2: 100 % (06/30/20 1430)    Discharge Disposition: Home    Discharge Diagnosis: Left inguinal lymphadenopathy     Follow-up: SHYAM Matthews MD  Diagnostic and Interventional Radiologist  Department of Radiology  Pager: [phone]

## 2020-06-30 NOTE — NURSING
Pt arrived to [facility] bed 4 for 1 hour post Lymph Node Bx of right groin recovery. Report received from Kelly CARR. Pt denies pain/discomfort. Dressing CDI. VSS. No acute events. See flow sheets for post procedure monitoring.

## 2020-06-30 NOTE — H&P
Vascular and Interventional Radiology History & Physical    Date:  2020    Chief Complaint:   Left groin lymphadenopathy    History of Present Illness:  Alin Burkett is a 70 y.o. male with hx of kidney transplant 2016 who presents for left groin lymph node biopsy for recurrent lymphadenopathy that began after being bitten by a dog in the left lower leg in 2020.     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  Renal allograft biopsy 17 (DIVINE): 21 glomeruli, none globally sclerosed, <5% interstitial fibrosis, no ACR, c4d negative, AVR CCT Type 2 (V1 lesion); plan THYMO     Dissecting aortic aneurysm (any part), abdominal     Diverticulosis     Encounter for blood transfusion     ESRD (end stage renal disease) 2010    H/O urethral stricture 2018    H/O: urethral stricture     History of AAA (abdominal aortic aneurysm) repair     History of urethral stricture 2018    Hypertension     Hypokalemia     Hypothyroidism 1/10/2014    Inguinal hernia bilateral, non-recurrent     Kidney stones     Organ transplant candidate 2013    Plantar warts 1/10/2014    Recurrent UTI 2017    S/P kidney transplant     Secondary hyperparathyroidism, renal     Thyroid disease        Past Surgical History:  Past Surgical History:   Procedure Laterality Date    ABDOMINAL SURGERY      exploratory lapatomy x 2     ABLATION N/A 8/22/2019    Procedure: ABLATION, SVT;  Surgeon: Emerson Mcmanus MD;  Location: [facility] EP LAB;  Service: Cardiology;  Laterality: N/A;  SVT, RFA, CARTO, anes, GP, 323    AORTA - SUPERIOR MESENTERIC ARTERY BYPASS GRAFT      BLADDER NECK RECONSTRUCTION      BLADDER SURGERY      COLONOSCOPY  10/10/2013    Dr. Gutierrez, repeat in 5 years    CYSTOSCOPY      CYSTOSCOPY N/A 12/19/2018    Procedure: CYSTOSCOPY;  Surgeon: Dewey Mann MD;  [facility];  Service: Urology;  Laterality: N/A;  45 min    CYSTOURETHROSCOPY WITH DIRECT VISION INTERNAL URETHROTOMY N/A 12/19/2018    Procedure: CYSTOSCOPY, WITH DIRECT VISION INTERNAL URETHROTOMY;  Surgeon: Dewey Mann MD;  [facility];  Service: Urology;  Laterality: N/A;    DILATION OF URETHRA N/A 12/19/2018    Procedure: DILATION, URETHRA;  Surgeon: Dewey Mann MD;  [facility];  Service: Urology;  Laterality: N/A;    FLEXIBLE CYSTOSCOPY N/A 11/6/2019    Procedure: CYSTOSCOPY, FLEXIBLE;  Surgeon: Dewey Mann MD;  Location: [facility] OR 2ND FLR;  Service: Urology;  Laterality: N/A;    GASTROJEJUNOSTOMY      HEMORRHOID SURGERY      HERNIA REPAIR      KIDNEY TRANSPLANT      LEFT HEART CATHETERIZATION Left 8/20/2019    Procedure: Left heart cath;  Surgeon: Javan Oscar MD;  Location: [facility] CATH/EP LAB;  Service: Cardiology;  Laterality: Left;    LITHOTRIPSY      PERCUTANEOUS NEPHROLITHOTRIPSY      right  ESWL  10/31/12    right ESWL  6/26/12    URETHROPLASTY USING PATCH GRAFT N/A 11/6/2019    Procedure: URETHROPLASTY, USING PATCH GRAFT BUCCAL MUCOSA GRAFT;  Surgeon: Dewey Mnan MD;  [facility];  Service: Urology;  Laterality: N/A;  3 HOURS        Sedation History:    Denies any adverse reactions.  Denies problems laying flat.    Social History:  Social History     Tobacco Use    Smoking status: Former Smoker     Packs/day: 0.50     Years: 40.00     Pack years: 20.00     Types:  Cigarettes     Quit date: 6/16/2010     Years since quitting: 10.0    Smokeless tobacco: Never Used   Substance Use Topics    Alcohol use: Yes     Comment: occasional/social    Drug use: No     Comment: THC in youth        Home Medications:   Prior to Admission medications    Medication Sig Start Date End Date Taking? Authorizing Provider   calcitRIOL (ROCALTROL) 0.5 MCG Cap Take 1 capsule (0.5 mcg total) by mouth once daily. 11/6/19  Yes Chasidy Mcclain MD   famotidine (PEPCID) 20 MG tablet TAKE 1 TABLET EVERY EVENING 3/12/20  Yes Chasidy Mcclain MD   ketoconazole (NIZORAL) 200 mg Tab TAKE ONE-HALF (1/2) TABLET ONCE DAILY 3/12/20  Yes Chasidy Mcclain MD   levothyroxine (SYNTHROID) 100 MCG tablet TAKE 1 TABLET DAILY 3/12/20  Yes Chasidy Mcclain MD   magnesium oxide (MAG-OX) 400 mg (241.3 mg magnesium) tablet Take 1 tablet (400 mg total) by mouth once daily. 3/4/19  Yes Chasidy Mcclain MD   metoprolol tartrate (LOPRESSOR) 25 MG tablet TAKE ONE-HALF (1/2) TABLET TWICE A DAY 3/12/20  Yes Chasidy Mcclain MD   multivitamin (ONE DAILY MULTIVITAMIN) per tablet Take 1 tablet by mouth once daily.   Yes Historical Provider, MD   mycophenolate (CELLCEPT) 250 mg Cap Take 3 capsules (750 mg total) by mouth 2 (two) times daily. Z94.0/Kidney transplant on 11/26/16. 4/6/20  Yes Chasidy Mcclain MD   sodium bicarbonate 650 MG tablet Take 1 tablet (650 mg total) by mouth 2 (two) times daily. 11/12/18  Yes Chasidy Mcclain MD   tacrolimus (PROGRAF) 1 MG Cap Take 3 capsules (3 mg total) by mouth every morning AND 2 capsules (2 mg total) every evening. Z94.0/Kidney Transplant on 11/26/16. 4/6/20  Yes Chasidy Mcclain MD   aspirin (ECOTRIN) 81 MG EC tablet Take 1 tablet (81 mg total) by mouth once daily. 8/25/19 8/24/20  Perri Gruber NP   pep injection Inject 0.25 ml as directed.   May increase by 0.05 ml such as 0.30, 0.35 ml etc. Up to 1.00 ml Until adequate result is achieved.    For compounding  pharmacy use:   Add PAPAVERINE 30 mcg  Add PHENTOLAMINE 10 mg  Add ALPROSTADIL 100 mcg 9/24/19   Dewey Mann MD       Inpatient Medications:  No current facility-administered medications for this encounter.      Anticoagulants/Antiplatelets:   aspirin- held since 6 days ago    Allergies:   Review of patient's allergies indicates:  No Known Allergies    Review of Systems:   As documented in primary provider H&P.    Vital Signs (Most Recent):  Temp: 97.9 °F (36.6 °C) (06/30/20 1112)  Pulse: 68 (06/30/20 1112)  Resp: 16 (06/30/20 1112)  BP: (!) 114/57 (06/30/20 1112)  SpO2: 100 % (06/30/20 1112)    Physical Exam:  No acute distress, laying comfortably in bed, pleasant and cooperative  Regular rate and rhythm  Breathing unlabored  Abdomen benign  Extremities warm and well perfused    Sedation Exam:  ASA: II - Patient appears to have mild systemic disease, adequately controlled   Mallampati: II (hard and soft palate, upper portion of tonsils anduvula visible)     Laboratory:  Lab Results   Component Value Date    INR 0.9 06/30/2020       Lab Results   Component Value Date    WBC 6.30 06/05/2020    HGB 13.6 (L) 06/05/2020    HCT 42.5 06/05/2020    MCV 95 06/05/2020     06/05/2020      Lab Results   Component Value Date    GLU 87 06/05/2020     06/05/2020    K 3.8 06/05/2020     06/05/2020    CO2 21 (L) 06/05/2020    BUN 23 06/05/2020    CREATININE 1.8 (H) 06/05/2020    CALCIUM 9.7 06/05/2020    MG 1.9 05/21/2020    ALT 28 06/05/2020    AST 32 06/05/2020    ALBUMIN 3.7 06/05/2020    BILITOT 0.8 06/05/2020    BILIDIR 0.3 11/05/2019       Imaging:  Reviewed.    ASSESSMENT/PLAN:                     Sedation Plan: Moderate  Patient will undergo: Left inguinal lymph node biopsy.    Jayson White MD  Radiology PGY-2

## 2020-06-30 NOTE — PROCEDURES
Radiology Post-Procedure Note    Pre Op Diagnosis: Left inguinal lymphadenopathy  Post Op Diagnosis: Same    Procedure: US guided lymph node biopsy    Procedure performed by: Enrcio Matthews MD    Written Informed Consent Obtained: Yes  Specimen Removed: YES 7 18g core specimens  Estimated Blood Loss: Minimal    Findings:   US guided left inguinal lymph node biopsy performed with 17/18g coaxial core needle.  7 18g core specimens obtained.  No complications.    Patient tolerated procedure well.    Enrico Matthews MD  Diagnostic and Interventional Radiologist  Department of Radiology  Pager: [phone]

## 2020-07-06 NOTE — TELEPHONE ENCOUNTER
----- Message from Julia Veloz sent at 7/6/2020  1:42 PM CDT -----  Contact: pt  Patient calling to speak with coordinator           Call Back : [phone]

## 2020-07-06 NOTE — TELEPHONE ENCOUNTER
Coordinator returned patient's call. Patient called to inform transplant nephrologist of his recent biopsy results from the left groin lymph nodes. Patient reports Dr. Collins is referring him to Oncology (Dr. Lopez)after receiving the biopsy results b-cell lymphoma. Informed patient that Dr. Collins informed Dr. Mcclain. Assured patient that Transplant will continue following his care. Patient verbalized understanding.

## 2020-07-07 NOTE — TELEPHONE ENCOUNTER
----- Message from Yadiel Montero MA sent at 7/7/2020  8:38 AM CDT -----  Regarding: RE: New lymphoma  Good morning Ankita I will forward this tread to our Nurse navigator Elisa Mccoy. In addition we are asking that any new pt referral be sent directly to her at [email or web address].    Connor Hernandez  ----- Message -----  From: Ankita Lockhart RN  Sent: 7/7/2020   8:34 AM CDT  To: , #  Subject: FW: New lymphoma                                 Hello,    Can we help coordinate this patient for the biopsy needed for his new diagnosis work up?     Thank you,   Ankita Lockhart, BRUNO  [id number]  ----- Message -----  From: Luis Fernando Lopez MD  Sent: 7/6/2020   2:18 PM CDT  To: Nohemi Collins MD, Ankita Lockhart, RN  Subject: New lymphoma                                     Can you please put this patient on my schedule on Thursday at 4? New dx of lymphoma. Can you also please try to get PET/CT as soon as possible and refer to surgery for excisional biopsy of inguinal lymph node?

## 2020-07-07 NOTE — TELEPHONE ENCOUNTER
Referral received for pt to see general surgery for excisional inguinal lymph node biopsy. Scheduled this Thursday with , attempted to call pt & confirm, no answer, left voicemail.

## 2020-07-09 NOTE — LETTER
July 13, 2020      Rima Song NP  [address] [facility]  [place] [address]           [facility]  [address]  [place] [address]  Phone: [phone]          Patient: Alin Burkett   MR Number: [id number]   YOB: 1950   Date of Visit: 7/9/2020       Dear Rima Song:    Thank you for referring Alin Burkett to me for evaluation. Attached you will find relevant portions of my assessment and plan of care.    If you have questions, please do not hesitate to call me. I look forward to following Alin Burkett along with you.    Sincerely,    Luis Fernando Lopez MD    Enclosure  CC:  No Recipients    If you would like to receive this communication electronically, please contact [email or web address] or [phone] to request more information on "[employer]" Link access.    For providers and/or their staff who would like to refer a patient to [facility], please contact us through our one-stop-shop provider referral line, Zuleima Collier, at [phone].    If you feel you have received this communication in error or would no longer like to receive these types of communications, please e-mail [email or web address]

## 2020-07-09 NOTE — PROGRESS NOTES
HEMATOLOGIC MALIGNANCIES CONSULT NOTE    IDENTIFYING STATEMENT   Alin Burkett (Alin Burkett) is a 70 y.o. male with a  of 1950 from [place], [place], referred by Dr. Nohemi Collins for evaluation of B-cell lymphoma.     HISTORY OF PRESENT ILLNESS:      Mr. Burkett is 70, is s/p renal transplantation in 2016 (complicated by rejection requiring ATG), HTN, AAA, and is referred for a new diagnosis of B-cell lymphoma.    He first noticed inguinal lymphadenopathy on the left after a dog bite in February of this year. He completed three courses of antibiotics with cephalexin but had incomplete resolution of the lymphadenopathy. He was eventually referred to infectious diseases.     Core needle biopsy took place on , which showed pathology consistent with B-cell lymphoma of germinal center origin. EBV was positive by LONNIE. Morphology was not definitive with respect to whether this is DLBCL/PTLD or follicular lymphoma.    PET/CT was obtained on  and showed the presence of L internal iliac lymphadenopathy as well as the L inguinal adenopathy. SUV max was 36 in each region, with juan david size measuring ~3 cm.     He had surgical consult this morning, and he is arranged for excisional node biopsy on Monday, .     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  Renal allograft biopsy 17 (DIVINE): 21 glomeruli, none globally sclerosed, <5% interstitial fibrosis, no ACR, c4d  negative, AVR CCT Type 2 (V1 lesion); plan THYMO     Dissecting aortic aneurysm (any part), abdominal     Diverticulosis     Encounter for blood transfusion     ESRD (end stage renal disease) 06/16/2010    H/O urethral stricture 11/27/2018    H/O: urethral stricture     History of AAA (abdominal aortic aneurysm) repair     History of urethral stricture 12/19/2018    Hypertension     Hypokalemia     Hypothyroidism 1/10/2014    Inguinal hernia bilateral, non-recurrent     Kidney stones     Organ transplant candidate 11/26/2013    Plantar warts 1/10/2014    Recurrent UTI 7/28/2017    S/P kidney transplant     Secondary hyperparathyroidism, renal     Thyroid disease        Family History   Problem Relation Age of Onset    Diabetes Mother     Alzheimer's disease Mother     Alcohol abuse Father     HIV Brother     Stroke Maternal Aunt     Kidney disease Paternal Uncle     Kidney disease Cousin     No Known Problems Sister     No Known Problems Daughter     No Known Problems Sister     No Known Problems Brother     No Known Problems Brother     Cancer Brother         thyroid cancer    Colon cancer Brother     Melanoma Neg Hx     Psoriasis Neg Hx     Lupus Neg Hx     Eczema Neg Hx     Colon polyps Neg Hx     Crohn's disease Neg Hx     Ulcerative colitis Neg Hx     Celiac disease Neg Hx        Social History     Socioeconomic History    Marital status: Significant Other     Spouse name: Not on file    Number of children: Not on file    Years of education: Not on file    Highest education level: Not on file   Occupational History     Employer: Disabled   Social Needs    Financial resource strain: Not on file    Food insecurity     Worry: Not on file     Inability: Not on file    Transportation needs     Medical: Not on file     Non-medical: Not on file   Tobacco Use    Smoking status: Former Smoker     Packs/day: 0.50     Years: 40.00     Pack years: 20.00     Types: Cigarettes      Quit date: 6/16/2010     Years since quitting: 10.0    Smokeless tobacco: Never Used   Substance and Sexual Activity    Alcohol use: Yes     Comment: occasional/social    Drug use: No     Comment: THC in youth    Sexual activity: Yes     Partners: Female     Birth control/protection: None   Lifestyle    Physical activity     Days per week: Not on file     Minutes per session: Not on file    Stress: Not on file   Relationships    Social connections     Talks on phone: Not on file     Gets together: Not on file     Attends Latter day service: Not on file     Active member of club or organization: Not on file     Attends meetings of clubs or organizations: Not on file     Relationship status: Not on file   Other Topics Concern    Not on file   Social History Narrative    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  0    calcitRIOL (ROCALTROL) 0.5 MCG Cap Take 1 capsule (0.5 mcg total) by mouth once daily. 30 capsule 11    famotidine (PEPCID) 20 MG tablet TAKE 1 TABLET EVERY EVENING 90 tablet 3    ketoconazole (NIZORAL) 200 mg Tab TAKE ONE-HALF (1/2) TABLET ONCE DAILY 45 tablet 3    levothyroxine (SYNTHROID) 100 MCG tablet TAKE 1 TABLET DAILY 90 tablet 3    magnesium oxide (MAG-OX) 400 mg (241.3 mg magnesium) tablet Take 1 tablet (400 mg total) by mouth once daily. 90 tablet 3    metoprolol tartrate (LOPRESSOR) 25 MG tablet TAKE ONE-HALF (1/2) TABLET TWICE A DAY 90 tablet 3    multivitamin (ONE DAILY MULTIVITAMIN) per tablet Take 1 tablet by mouth once daily.      mycophenolate (CELLCEPT) 250 mg Cap Take 3 capsules (750 mg total) by mouth 2 (two) times daily. Z94.0/Kidney transplant on 11/26/16. 180 capsule 11    pep injection Inject 0.25 ml as directed.   May increase by 0.05 ml such as 0.30, 0.35  ml etc. Up to 1.00 ml Until adequate result is achieved.    For compounding pharmacy use:   Add PAPAVERINE 30 mcg  Add PHENTOLAMINE 10 mg  Add ALPROSTADIL 100 mcg 1 vial 2    sodium bicarbonate 650 MG tablet Take 1 tablet (650 mg total) by mouth 2 (two) times daily. 540 tablet 3    tacrolimus (PROGRAF) 1 MG Cap Take 3 capsules (3 mg total) by mouth every morning AND 2 capsules (2 mg total) every evening. Z94.0/Kidney Transplant on 11/26/16. 150 capsule 11     No current facility-administered medications on file prior to visit.        ALLERGIES: Review of patient's allergies indicates:  No Known Allergies     ROS:       Review of Systems   Constitutional: Negative for diaphoresis, fatigue, fever and unexpected weight change.   HENT:   Negative for lump/mass and sore throat.    Eyes: Negative for icterus.   Respiratory: Negative for cough and shortness of breath.    Cardiovascular: Negative for chest pain and palpitations.   Gastrointestinal: Negative for abdominal distention, constipation, diarrhea, nausea and vomiting.   Genitourinary: Negative for dysuria and frequency.    Musculoskeletal: Negative for arthralgias, gait problem and myalgias.   Skin: Negative for rash.   Neurological: Negative for dizziness, gait problem and headaches.   Hematological: Negative for adenopathy. Does not bruise/bleed easily.   Psychiatric/Behavioral: The patient is not nervous/anxious.        PHYSICAL EXAM:  There were no vitals filed for this visit.    Physical Exam  Constitutional:       General: He is not in acute distress.     Appearance: He is well-developed.   HENT:      Head: Normocephalic and atraumatic.      Mouth/Throat:      Mouth: No oral lesions.   Eyes:      Conjunctiva/sclera: Conjunctivae normal.   Neck:      Thyroid: No thyromegaly.   Cardiovascular:      Rate and Rhythm: Normal rate and regular rhythm.      Heart sounds: Normal heart sounds. No murmur.   Pulmonary:      Breath sounds: Normal breath sounds. No  wheezing or rales.   Abdominal:      General: There is no distension.      Palpations: Abdomen is soft. There is no hepatomegaly, splenomegaly or mass.      Tenderness: There is no abdominal tenderness.   Lymphadenopathy:      Cervical: No cervical adenopathy.      Right cervical: No deep cervical adenopathy.     Left cervical: No deep cervical adenopathy.      Lower Body: Right inguinal adenopathy present.   Skin:     Findings: No rash.   Neurological:      Mental Status: He is alert and oriented to person, place, and time.      Cranial Nerves: No cranial nerve deficit.      Coordination: Coordination normal.      Deep Tendon Reflexes: Reflexes are normal and symmetric.         LAB:   Results for orders placed or performed during the hospital encounter of 07/08/20   POCT glucose   Result Value Ref Range    POCT Glucose 87 70 - 110 mg/dL     *Note: Due to a large number of results and/or encounters for the requested time period, some results have not been displayed. A complete set of results can be found in Results Review.       PROBLEMS ASSESSED THIS VISIT:    No diagnosis found.    IMPRESSION:    1. B-cell lymphoma   A. 2/2020: Noticed left inguinal lymphadenopathy after a dog bite (failed to resolve with antibiotics)   B. 6/2/2020: Saw Dr. Collins in infectious diseases for inguinal lymphadenopathy - referred for biopsy   C. 6/30/2020: Core biopsy of L inguinal lymph node shows B-cell lymphoma of germinal center origin; EBV-positive by LONNIE; morphology nondiagnostic of PTLD vs follicular lymphoma   D. 7/8/2020: PET/CT shows left internal iliac chain node measuring 3.3 x 3 cm with SUV max 37; L inguinal node measures 3.2 x 2.4 cm with SUV max 36    2. History of alcohol abuse, quit 2010  3. Hypertension, complicated by left ventricular hypertrophy  4. Supraventricular tachycardia  5. Abdominal aortic atherosclerosis with aneurysm  6. S/p kidney transplant 11/26/2016 c/b AVR s/p thymo 1/2017  7. Benign prostatic  hyperplasia  8. Secondary hyperparathryoidism  9. Obstructive sleep apnea    PLAN:       B-cell lymphoma    Mr. Burkett has a new diagnosis of B-cell lymphoma based on his core biopsy of an inguinal lymph node. This is most likely an EBV-positive PTLD; however, pathologic analysis was more consistent with follicular lymphoma. We will need excisional lymph node biopsy to make a definitive diagnosis as well as to establish prognosis and a therapeutic plan.    We discussed the possibility of reduction of immune suppression with possible rituximab or even R-CHOP therapy depending on the morphology of the biopsy.     We have already completed PET/CT. Our next steps are as follows:  - Labs: CBC, CMP, LDH, EBV PCR, hep B core antibody, hep B surface antigen, HIV - labs to be done tomorrow  - Refer to surgery for excisional biopsy of L inguinal lymph node  - Bone marrow biopsy  - Recommend to kidney transplant to team to reduce immunosuppression as much as possible.     S/p kidney transplant    Will work with transplant team regarding plan to reduce immunosuppression. If possible, I would like to see him weaned off steroids and mycophenolate mofetil.    Follow-up  Pending biopsy results    Luis Fernando Lopez MD  Hematology and Stem Cell Transplant    This visit lasted one hour, with more than half dedicated to education/counseling.

## 2020-07-09 NOTE — PROGRESS NOTES
History & Physical    SUBJECTIVE:     History of Present Illness:  Mr Burkett is a 70 y.o. male who is accompanied by his daughter after being referred to us by Dr Collins after she requested an incisional biopsy on an enlarged left inguinal lymph node. He reports that his lymph node has been waxing and waning for months after the dog bite and that he didn't quite understand why he was referred to us. I explained that he was going to have a excisional biopsy to remove that enlarged lymph node and provide further detail for treatment. He otherwise feels well although describes himself as a couch potato. His pathology from the incisional biopsy and results of the PET scan are as follows:    6/30/2020: Biopsy results:   Flow cytometric analysis of lymph node detects a kappa restricted B lymphocyte population that expresses CD19, CD20, and CD10.  CD5 is negative.   Flow differential:  Lymphocytes 70.4%, Monocytes 0.9%, Granulocytes  22.2%, Blast  0.4%, Debris/nRBC 1.8%,  Viability 72.6%.   Immunohistochemical stains are performed with adequate controls for greater   sensitivity and further architectural assessment.  CD21/CD23 highlights   disrupted follicular dendritic cell meshworks.    The lymphoid follicles contain CD20-positive B-cells.  The B-cells show expression of CD10 (very weak), BCL6, BCL2 (weak), and high proliferation index (Ki-67 70%).  They are   negative for cyclin D1.  CD3-positive T-cells are seen.  Rare lymphoid cells are positive for EBV by LONNIE in situ hybridization study.     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  Follicular lymphoma is favored.  However, limited sampling precludes definite diagnosis.  Given patient history of kidney transplant, posttransplant lymphoproliferative disorder cannot be excluded.  Excisional biopsy is required.     07/08/20 NM PET SCAN:  In the abdomen and pelvis, there is hypermetabolic lymphadenopathy throughout  the left internal/external iliac chain and left groin.  New left internal iliac chain node measures 3.3 x 3 0 cm with SUV max of 37 (axial fused image 193).  Left inguinal node measures approximately 3.2 x 2.4 cm with SUV max of 36 (axial fused image 218), previously measured up to 1.4 cm.  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  The right kidney appears atrophic with abnormal contour parenchymal calcifications, unchanged.  Right lower quadrant transplant kidney in place.  Stable small bladder diverticulum.  Stable 1.2 cm pancreatic cyst, better characterized on most recent CT with IV contrast.  Stable bilateral probable adrenal adenomas.  Colonic diverticulosis without evidence of acute diverticulitis.  Stable fusiform abdominal aortic ectasia.  No hypermetabolic juan david disease above the diaphragm.  The Deauville score is 5.       Previous HX:       Patient reports in mid-February, he was at a flea market in [place]. He was visiting a vendor and was bitten by the vendor's small dog. It cut through his pants and to the skin but did not draw any blood. Patient noted this dog appeared to be well cared for and was not concerned about potential for rabies.  He is not able to trace the vendor or dog for confirmation that the dog is still healthy.  Afterwards, he developed a painful lymph node in his left groin.  He was given 14 days of cephalexin with resolution in lymph node swelling. A couple of days after completion of antibiotics, the lymph node started enlarging again. After about a week, the lymph node continued to grow in size and became more painful.  He was given a second course of cephalexin for 14 days with decrease in the swelling of the lymph node.  Two days after stopping antibiotics, swelling returned, patient was restarted on another 14 day course of cephalexin.  Patient has just completed his third course of cephalexin with resolution in lymph node swelling.     He has a past medical history of HTN,  hypothyroidism, congenital absence of left kidney, ESRD s/p kidney transplant 11/26/2016 (on tacro, [facility]) c/b AVR s/p thymo 1/2017, uretheral stricture, BPH, bladder diverticulum s/p urethral dilation 7/6, presents for evaluation of recurrent left lymph node swelling.    Patient reports he has received a TDaP 2018.     Patient denies any fevers, chills, sweats, cough, SOB, CP, nvd, abdominal pain, hematuria, dysuria.     Patient denies any recent travel. Patient lives in [place], [place].  Patient has two dogs at home  Denied having any outdoor hobbies, no recent outdoor activities.     Dr. Epstein was able to visit w him today and we scheduled him for an excisional biopsy. All consents were done by the surgical resident w detailed outline of risks, benefits, possible complications, probable solutions and rationale. He expressed understanding and wishes to proceed.     Chief Complaint   Patient presents with    Consult     L inguinal Excisional Biopsy       Review of patient's allergies indicates:  No Known Allergies    Current Outpatient Medications   Medication Sig Dispense Refill    aspirin (ECOTRIN) 81 MG EC tablet Take 1 tablet (81 mg total) by mouth once daily.  0    calcitRIOL (ROCALTROL) 0.5 MCG Cap Take 1 capsule (0.5 mcg total) by mouth once daily. 30 capsule 11    famotidine (PEPCID) 20 MG tablet TAKE 1 TABLET EVERY EVENING 90 tablet 3    ketoconazole (NIZORAL) 200 mg Tab TAKE ONE-HALF (1/2) TABLET ONCE DAILY 45 tablet 3    levothyroxine (SYNTHROID) 100 MCG tablet TAKE 1 TABLET DAILY 90 tablet 3    magnesium oxide (MAG-OX) 400 mg (241.3 mg magnesium) tablet Take 1 tablet (400 mg total) by mouth once daily. 90 tablet 3    metoprolol tartrate (LOPRESSOR) 25 MG tablet TAKE ONE-HALF (1/2) TABLET TWICE A DAY 90 tablet 3    multivitamin (ONE DAILY MULTIVITAMIN) per tablet Take 1 tablet by mouth once daily.      mycophenolate (CELLCEPT) 250 mg Cap Take 3 capsules (750 mg total) by mouth 2 (two) times  daily. Z94.0/Kidney transplant on 16. 180 capsule 11    pep injection Inject 0.25 ml as directed.   May increase by 0.05 ml such as 0.30, 0.35 ml etc. Up to 1.00 ml Until adequate result is achieved.    For compounding pharmacy use:   Add PAPAVERINE 30 mcg  Add PHENTOLAMINE 10 mg  Add ALPROSTADIL 100 mcg 1 vial 2    sodium bicarbonate 650 MG tablet Take 1 tablet (650 mg total) by mouth 2 (two) times daily. 540 tablet 3    tacrolimus (PROGRAF) 1 MG Cap Take 3 capsules (3 mg total) by mouth every morning AND 2 capsules (2 mg total) every evening. Z94.0/Kidney Transplant on 16. 150 capsule 11    travoprost (TRAVATAN Z) 0.004 % ophthalmic solution INSTILL 1 DROP INTO EACH EYE ONCE DAILY AT NIGHT       No current facility-administered medications for this visit.        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  Renal allograft biopsy 17 (DIVINE): 21 glomeruli, none globally sclerosed, <5% interstitial fibrosis, no ACR, c4d negative, AVR CCT Type 2 (V1 lesion); plan THYMO     Dissecting aortic aneurysm (any part), abdominal     Diverticulosis     Encounter for blood transfusion     ESRD (end stage renal disease) 2010    H/O urethral stricture 2018    H/O: urethral stricture     History of AAA (abdominal aortic aneurysm) repair     History of urethral stricture 2018    Hypertension     Hypokalemia     Hypothyroidism 1/10/2014     Inguinal hernia bilateral, non-recurrent     Kidney stones     Organ transplant candidate 11/26/2013    Plantar warts 1/10/2014    Recurrent UTI 7/28/2017    S/P kidney transplant     Secondary hyperparathyroidism, renal     Thyroid disease      Past Surgical History:   Procedure Laterality Date    ABDOMINAL SURGERY      exploratory lapatomy x 2    ABLATION N/A 8/22/2019    Procedure: ABLATION, SVT;  Surgeon: Emerson Mcmanus MD;  Location: [facility] EP LAB;  Service: Cardiology;  Laterality: N/A;  SVT, RFA, CARTO, anes, GP, 323    AORTA - SUPERIOR MESENTERIC ARTERY BYPASS GRAFT      BLADDER NECK RECONSTRUCTION      BLADDER SURGERY      COLONOSCOPY  10/10/2013    Dr. Gutierrez, repeat in 5 years    CYSTOSCOPY      CYSTOSCOPY N/A 12/19/2018    Procedure: CYSTOSCOPY;  Surgeon: Dewey Mann MD;  Location: [address];  Service: Urology;  Laterality: N/A;  45 min    CYSTOURETHROSCOPY WITH DIRECT VISION INTERNAL URETHROTOMY N/A 12/19/2018    Procedure: CYSTOSCOPY, WITH DIRECT VISION INTERNAL URETHROTOMY;  Surgeon: Dewey Mann MD;  Location: [address];  Service: Urology;  Laterality: N/A;    DILATION OF URETHRA N/A 12/19/2018    Procedure: DILATION, URETHRA;  Surgeon: Dewey Mann MD;  Location: [facility] OR [address] [place] [address];  Service: Urology;  Laterality: N/A;    FLEXIBLE CYSTOSCOPY N/A 11/6/2019    Procedure: CYSTOSCOPY, FLEXIBLE;  Surgeon: Dewey Mann MD;  Location: [facility] OR [address] [place] [address];  Service: Urology;  Laterality: N/A;    GASTROJEJUNOSTOMY      HEMORRHOID SURGERY      HERNIA REPAIR      KIDNEY TRANSPLANT      LEFT HEART CATHETERIZATION Left 8/20/2019    Procedure: Left heart cath;  Surgeon: Javan Oscar MD;  Location: [facility] CATH/EP LAB;  Service: Cardiology;  Laterality: Left;    LITHOTRIPSY      LYMPH NODE BIOPSY N/A 6/30/2020    Procedure: BIOPSY, LYMPH NODE;  Surgeon: Ella Diagnostic Provider;  Location: [facility] OR [address] [place] [address];  Service: General;  Laterality: N/A;  189 lymph node biopsy  /ULTRASOUND    PERCUTANEOUS NEPHROLITHOTRIPSY      right  ESWL  10/31/12    right ESWL  6/26/12    URETHROPLASTY USING PATCH GRAFT N/A 11/6/2019    Procedure: URETHROPLASTY, USING PATCH GRAFT BUCCAL MUCOSA GRAFT;  Surgeon: Dewey Mann MD;  Location: [facility] OR [address] [place] [address];  Service: Urology;  Laterality: N/A;  3 HOURS     Family History   Problem Relation Age of Onset    Diabetes Mother     Alzheimer's disease Mother     Alcohol abuse Father     HIV Brother     Stroke Maternal Aunt     Kidney disease Paternal Uncle     Kidney disease Cousin     No Known Problems Sister     No Known Problems Daughter     No Known Problems Sister     No Known Problems Brother     No Known Problems Brother     Cancer Brother         thyroid cancer    Colon cancer Brother     Melanoma Neg Hx     Psoriasis Neg Hx     Lupus Neg Hx     Eczema Neg Hx     Colon polyps Neg Hx     Crohn's disease Neg Hx     Ulcerative colitis Neg Hx     Celiac disease Neg Hx      Social History     Tobacco Use    Smoking status: Former Smoker     Packs/day: 0.50     Years: 40.00     Pack years: 20.00     Types: Cigarettes     Quit date: 6/16/2010     Years since quitting: 10.0    Smokeless tobacco: Never Used   Substance Use Topics    Alcohol use: Yes     Comment: occasional/social    Drug use: No     Comment: THC in youth        Review of Systems:  Review of Systems   Constitutional: Negative for activity change, appetite change, fever and unexpected weight change.   HENT: Negative.    Eyes: Negative.    Respiratory: Negative.    Cardiovascular: Negative.    Gastrointestinal: Negative.    Endocrine: Negative.    Genitourinary: Negative.    Musculoskeletal: Negative.    Allergic/Immunologic: Negative.    Neurological: Negative.    Hematological: Negative.    Psychiatric/Behavioral: Negative.        OBJECTIVE:     Vital Signs (Most Recent)  Temp: 98.4 °F (36.9 °C) (07/09/20 1416)  Pulse: 73 (07/09/20 1416)  BP: 123/83 (07/09/20  "4796  5' 11" (1.803 m)  69.9 kg (154 lb)     Physical Exam:  Physical Exam  Constitutional:       General: He is awake.      Appearance: Normal appearance. He is well-developed.   HENT:      Head: Normocephalic and atraumatic.      Jaw: There is normal jaw occlusion.      Salivary Glands: Right salivary gland is not diffusely enlarged or tender. Left salivary gland is not diffusely enlarged or tender.   Eyes:      General: Lids are normal.      Conjunctiva/sclera: Conjunctivae normal.   Neck:      Musculoskeletal: Full passive range of motion without pain.      Thyroid: No thyroid mass, thyromegaly or thyroid tenderness.      Trachea: Trachea and phonation normal.   Cardiovascular:      Rate and Rhythm: Normal rate and regular rhythm.      Pulses:           Radial pulses are 2+ on the right side and 2+ on the left side.      Heart sounds: Normal heart sounds, S1 normal and S2 normal.      Comments: 1+ Pitting edema in left ankle. New finding for him.   Pulmonary:      Effort: Pulmonary effort is normal.      Breath sounds: Examination of the right-middle field reveals decreased breath sounds. Examination of the right-lower field reveals decreased breath sounds. Decreased breath sounds present.   Abdominal:      General: Abdomen is flat. Bowel sounds are normal.      Palpations: Abdomen is soft.      Tenderness: There is no abdominal tenderness.      Hernia: No hernia is present.      Comments: Multiple well healed incisions from previous surgeries   Musculoskeletal:        Legs:       Comments: ROM WNL, ambulates well, able to get up on table wo assist and easily   Lymphadenopathy:      Cervical: No cervical adenopathy.      Upper Body:      Right upper body: No supraclavicular adenopathy.      Left upper body: No supraclavicular adenopathy.      Lower Body: Left inguinal adenopathy present.      Comments: Large, non mobile, non tender and hard enlarged lymph node.    Skin:     General: Skin is warm and dry.      " Capillary Refill: Capillary refill takes less than 2 seconds.      Comments: tanned   Neurological:      General: No focal deficit present.      Mental Status: He is alert and oriented to person, place, and time.      Coordination: Coordination is intact.   Psychiatric:         Attention and Perception: Attention and perception normal.         Mood and Affect: Mood and affect normal.         Speech: Speech normal.         Behavior: Behavior normal. Behavior is cooperative.         Cognition and Memory: Cognition and memory normal.         Judgment: Judgment normal.         Laboratory  reviewed    Diagnostic Results:  NM PET scan report and image reviewed by both Dr. Epstein and I separately    ASSESSMENT/PLAN:     IMPR: possible B cell lymphoma in L inguinal LN.     PLAN:    1. Schedule excisional biopsy  2. Follow up w Dr. Gould for treatment.

## 2020-07-09 NOTE — LETTER
July 9, 2020      Luis Fernando Lopez MD  [address] [facility] [address]           [facility]  [address]  [place] [address]  Phone: [phone]          Patient: Alin Burkett   MR Number: [id number]   YOB: 1950   Date of Visit: 7/9/2020       Dear Dr. Luis Fernando Lopez:    Thank you for referring Alin Burkett to me for evaluation. Attached you will find relevant portions of my assessment and plan of care.    If you have questions, please do not hesitate to call me. I look forward to following Alin Burkett along with you.    Sincerely,    Vlad Epstein MD    Enclosure  CC:  No Recipients    If you would like to receive this communication electronically, please contact [email or web address] or [phone] to request more information on [employer] access.    For providers and/or their staff who would like to refer a patient to [facility], please contact us through our one-stop-shop provider referral line, [facility], at [phone].    If you feel you have received this communication in error or would no longer like to receive these types of communications, please e-mail [email or web address]

## 2020-07-12 NOTE — ANESTHESIA PREPROCEDURE EVALUATION
[facility]  Anesthesia Pre-Operative Evaluation         Patient Name: Alin Burkett  YOB: 1950  MRN: [id number]    SUBJECTIVE:     Pre-operative evaluation for Procedure(s) (LRB):  EXCISIONAL BIOPSY- LEFT INGUINAL NODE (N/A)     2020    Alin Burkett is a 70 y.o. male w/ a significant PMHx of hypertension, LOUISA, SVT status post ablation, acquired hypothyroidism, LVH, abdominal aortic aneurysm without rupture, anemia of chronic disease, anemia, congenital absence of kidney, immunocompromised state status post  donor kidney transplant who presents for the above procedure.    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  Excision biopsy required for definitive diagnosis.      LDA: None documented.    Prev airway:   Placement Date: 19; Placement Time: 0806; Method of Intubation: Direct laryngoscopy; Inserted by: CRNA; Airway Device: Endotracheal Tube; Mask Ventilation: Easy - oral; Intubated: Postinduction; Blade: Dotson #2; Airway Device Size: 7.5; Style: Cuffed; Cuff Inflation: Minimal occlusive pressure; Inflation Amount: 7; Placement Verified By: Auscultation, Capnometry, ETT Condensation; Grade: Grade I; Complicating Factors: None; Intubation Findings: Positive EtCO2, Bilateral breath sounds, Atraumatic/Condition of teeth unchanged;  Depth of Insertion: 23; Securment: Lips; Complications: None; Breath Sounds: Equal Bilateral; Insertion Attempts: 1;     Drips: None documented.    Patient Active Problem List   Diagnosis    Adrenal adenoma    Essential hypertension    Congenital absence of kidney    Anemia of chronic disease    Secondary hyperparathyroidism, renal    Acquired hypothyroidism    Plantar warts    History of smoking 10-25 pack years, quit , 20 pack-years    History of alcohol abuse, quit     LOUISA  (obstructive sleep apnea), CPAP refused    LVH (left ventricular hypertrophy) due to hypertensive disease    BPH (benign prostatic hyperplasia)    Immunosuppressive management encounter following kidney transplant    -donor kidney transplant    S/P kidney transplant    Hypophosphatemia    Stage 3 chronic kidney disease    Stricture of anterior urethra in male    Bladder diverticulum    Thrombocytopenia    Abdominal aortic atherosclerosis    Abdominal aortic aneurysm (AAA) without rupture    SVT (supraventricular tachycardia)    Rectal bleeding    Viral URI with cough    Immunocompromised state    Inguinal adenopathy    Lymphadenopathy       Review of patient's allergies indicates:  No Known Allergies    Current Inpatient Medications:      No current facility-administered medications on file prior to encounter.      Current Outpatient Medications on File Prior to Encounter   Medication Sig Dispense Refill    aspirin (ECOTRIN) 81 MG EC tablet Take 1 tablet (81 mg total) by mouth once daily.  0    calcitRIOL (ROCALTROL) 0.5 MCG Cap Take 1 capsule (0.5 mcg total) by mouth once daily. 30 capsule 11    famotidine (PEPCID) 20 MG tablet TAKE 1 TABLET EVERY EVENING 90 tablet 3    ketoconazole (NIZORAL) 200 mg Tab TAKE ONE-HALF (1/2) TABLET ONCE DAILY 45 tablet 3    levothyroxine (SYNTHROID) 100 MCG tablet TAKE 1 TABLET DAILY 90 tablet 3    magnesium oxide (MAG-OX) 400 mg (241.3 mg magnesium) tablet Take 1 tablet (400 mg total) by mouth once daily. 90 tablet 3    metoprolol tartrate (LOPRESSOR) 25 MG tablet TAKE ONE-HALF (1/2) TABLET TWICE A DAY 90 tablet 3    multivitamin (ONE DAILY MULTIVITAMIN) per tablet Take 1 tablet by mouth once daily.      mycophenolate (CELLCEPT) 250 mg Cap Take 3 capsules (750 mg total) by mouth 2 (two) times daily. Z94.0/Kidney transplant on 16. 180 capsule 11    pep injection Inject 0.25 ml as directed.   May increase by 0.05 ml such as 0.30, 0.35 ml etc.  Up to 1.00 ml Until adequate result is achieved.    For compounding pharmacy use:   Add PAPAVERINE 30 mcg  Add PHENTOLAMINE 10 mg  Add ALPROSTADIL 100 mcg 1 vial 2    sodium bicarbonate 650 MG tablet Take 1 tablet (650 mg total) by mouth 2 (two) times daily. 540 tablet 3    tacrolimus (PROGRAF) 1 MG Cap Take 3 capsules (3 mg total) by mouth every morning AND 2 capsules (2 mg total) every evening. Z94.0/Kidney Transplant on 11/26/16. 150 capsule 11    travoprost (TRAVATAN Z) 0.004 % ophthalmic solution INSTILL 1 DROP INTO EACH EYE ONCE DAILY AT NIGHT         Past Surgical History:   Procedure Laterality Date    ABDOMINAL SURGERY      exploratory lapatomy x 2    ABLATION N/A 8/22/2019    Procedure: ABLATION, SVT;  Surgeon: Emerson Mcmanus MD;  Location: [facility] EP LAB;  Service: Cardiology;  Laterality: N/A;  SVT, RFA, CARTO, anes, GP, 323    AORTA - SUPERIOR MESENTERIC ARTERY BYPASS GRAFT      BLADDER NECK RECONSTRUCTION      BLADDER SURGERY      COLONOSCOPY  10/10/2013    Dr. Gutierrez, repeat in 5 years    CYSTOSCOPY      CYSTOSCOPY N/A 12/19/2018    Procedure: CYSTOSCOPY;  Surgeon: Dewey Mann MD;  Location: [address];  Service: Urology;  Laterality: N/A;  45 min    CYSTOURETHROSCOPY WITH DIRECT VISION INTERNAL URETHROTOMY N/A 12/19/2018    Procedure: CYSTOSCOPY, WITH DIRECT VISION INTERNAL URETHROTOMY;  Surgeon: Dewey Mann MD;  Location: [address];  Service: Urology;  Laterality: N/A;    DILATION OF URETHRA N/A 12/19/2018    Procedure: DILATION, URETHRA;  Surgeon: Dewey Mann MD;  Location: [facility] OR [address] [place] [address];  Service: Urology;  Laterality: N/A;    FLEXIBLE CYSTOSCOPY N/A 11/6/2019    Procedure: CYSTOSCOPY, FLEXIBLE;  Surgeon: Dewey Mann MD;  Location: [facility] OR [address] [place] [address];  Service: Urology;  Laterality: N/A;    GASTROJEJUNOSTOMY      HEMORRHOID SURGERY      HERNIA REPAIR      KIDNEY TRANSPLANT      LEFT HEART CATHETERIZATION Left 8/20/2019    Procedure: Left heart cath;   Surgeon: Javan Oscar MD;  Location: [facility] CATH/EP LAB;  Service: Cardiology;  Laterality: Left;    LITHOTRIPSY      LYMPH NODE BIOPSY N/A 6/30/2020    Procedure: BIOPSY, LYMPH NODE;  Surgeon: Ella Diagnostic Provider;  Location: [facility] OR [address] [place] [address];  Service: General;  Laterality: N/A;  189 lymph node biopsy /ULTRASOUND    PERCUTANEOUS NEPHROLITHOTRIPSY      right  ESWL  10/31/12    right ESWL  6/26/12    URETHROPLASTY USING PATCH GRAFT N/A 11/6/2019    Procedure: URETHROPLASTY, USING PATCH GRAFT BUCCAL MUCOSA GRAFT;  Surgeon: Dewey Mann MD;  Location: [facility] OR [address] [place] [address];  Service: Urology;  Laterality: N/A;  3 HOURS       Social History     Socioeconomic History    Marital status: Significant Other     Spouse name: Not on file    Number of children: Not on file    Years of education: Not on file    Highest education level: Not on file   Occupational History     Employer: Disabled   Social Needs    Financial resource strain: Not on file    Food insecurity     Worry: Not on file     Inability: Not on file    Transportation needs     Medical: Not on file     Non-medical: Not on file   Tobacco Use    Smoking status: Former Smoker     Packs/day: 0.50     Years: 40.00     Pack years: 20.00     Types: Cigarettes     Quit date: 6/16/2010     Years since quitting: 10.0    Smokeless tobacco: Never Used   Substance and Sexual Activity    Alcohol use: Yes     Comment: occasional/social    Drug use: No     Comment: THC in youth    Sexual activity: Yes     Partners: Female     Birth control/protection: None   Lifestyle    Physical activity     Days per week: Not on file     Minutes per session: Not on file    Stress: Not on file   Relationships    Social connections     Talks on phone: Not on file     Gets together: Not on file     Attends [employer] service: Not on file     Active member of club or organization: Not on file     Attends meetings of clubs or organizations: Not on file     Relationship  status: Not on file   Other Topics Concern    Not on file   Social History Narrative    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    Diagnostic Studies: No relevant studies.    EKG: No recent studies available.    2D ECHO:  Results for orders placed or performed during the hospital encounter of 09/01/16   2D echo with color flow doppler   Result Value Ref Range    QEF 70 55 - 65    Mitral Valve Regurgitation TRIVIAL     Diastolic Dysfunction Yes (A)     Est. PA Systolic Pressure 23     Tricuspid Valve Regurgitation TRIVIAL          ASSESSMENT/PLAN:                                                                                                                    07/12/2020  Alin Burkett is a 70 y.o., male.    Anesthesia Evaluation    I have reviewed the Patient Summary Reports.    I have reviewed the Nursing Notes.    I have reviewed the Medications.     Review of Systems  Anesthesia Hx:  No problems with previous Anesthesia Denies Hx of Anesthetic complications  History of prior surgery of interest to airway management or planning: Denies Family Hx of Anesthesia complications.   Denies Personal Hx of Anesthesia complications.   Social:  Former Smoker    Hematology/Oncology:     Oncology Normal   Hematology Comments: Thrombocytopenia, mild   EENT/Dental:EENT/Dental Normal   Cardiovascular:   Exercise tolerance: good Hypertension Valvular problems/Murmurs Dysrhythmias CHF ECG has been reviewed. Recent elevation of Troponins in 8/2019 in the  setting of SVT, s/p Ablation during that admission    Left heart cath 8/2019 Normal coronaries with sluggish flow in the LAD and the RCA    Hypertrophic subaortic  Stenosis, FRANCISCO noted on recent TTE    Previous AAA repair   Pulmonary:   Sleep Apnea    Renal/:   Chronic Renal Disease, CRI S/p Kidney Transplant 3 years ago   Hepatic/GI:  Hepatic/GI Normal    Musculoskeletal:  Musculoskeletal Normal    Neurological:  Neurology Normal    Endocrine:   Hypothyroidism    Dermatological:  Skin Normal    Psych:  Psychiatric Normal           Physical Exam  General:  Well nourished    Airway/Jaw/Neck:  Airway Findings: Mouth Opening: Normal Tongue: Normal  General Airway Assessment: Adult  Mallampati: III  Improves to II with phonation.  TM Distance: Normal, at least 6 cm  Jaw/Neck Findings:     Eyes/Ears/Nose:  EYES/EARS/NOSE FINDINGS: Normal   Dental:  Dental Findings:    Chest/Lungs:  Chest/Lungs Clear    Heart/Vascular:  Heart Findings: Normal Heart murmur: negative Vascular Findings:  Dialysis Access: AV Fistula LUE  Vascular Exam Findings: No thrill or bruit through LUE AVF     Abdomen:  Abdomen Findings: Normal    Musculoskeletal:  Musculoskeletal Findings: Normal   Skin:  Skin Findings: Normal    Mental Status:  Mental Status Findings:  Cooperative, Alert and Oriented         Anesthesia Plan  Type of Anesthesia, risks & benefits discussed:  Anesthesia Type:  MAC, general  Patient's Preference:   Intra-op Monitoring Plan: standard ASA monitors  Intra-op Monitoring Plan Comments:   Post Op Pain Control Plan: multimodal analgesia, per primary service following discharge from PACU and IV/PO Opioids PRN  Post Op Pain Control Plan Comments:   Induction:   IV  Beta Blocker:  Patient is on a Beta-Blocker and has received one dose within the past 24 hours (No further documentation required).       Informed Consent: Patient understands risks and agrees with Anesthesia plan.  Questions answered. Anesthesia consent signed with  patient.  ASA Score: 3     Day of Surgery Review of History & Physical:  There are no significant changes.  H&P update referred to the surgeon.         Ready For Surgery From Anesthesia Perspective.

## 2020-07-13 NOTE — TRANSFER OF CARE
"Anesthesia Transfer of Care Note    Patient: Alin Burkett    Procedure(s) Performed: Procedure(s) (LRB):  EXCISIONAL BIOPSY- LEFT INGUINAL NODE (N/A)    Patient location: PACU    Anesthesia Type: general    Transport from OR: Transported from OR on 6-10 L/min O2 by face mask with adequate spontaneous ventilation    Post pain: adequate analgesia    Post assessment: no apparent anesthetic complications    Post vital signs: stable    Level of consciousness: awake and alert    Nausea/Vomiting: no nausea/vomiting    Complications: none    Transfer of care protocol was followed      Last vitals:   Visit Vitals  /78 (BP Location: Right arm, Patient Position: Lying)   Pulse 62   Temp 36.8 °C (98.2 °F) (Temporal)   Resp 18   Ht 5' 11" (1.803 m)   Wt 69.9 kg (154 lb)   SpO2 99%   BMI 21.48 kg/m²     "

## 2020-07-13 NOTE — DISCHARGE INSTRUCTIONS
General Discharge Instructions    BATHING:   Keep the operation site dry for 24 hours.   You may shower in  24 hours, no scrubbing, gently pat dry.     DRESSING:  Do not remove steri strips. They should be removed in 7-10 days. If they have not come off by themselves at that time, these can be removed easily in your shower or bath.     ACTIVITY LEVEL:  If you have received sedation or an anesthetic, you may feel sleepy for several hours. Rest until you are more awake. Gradually resume your normal activities. No heavy lifting, straining, or vigorous activity.     DIET:  At home, continue with liquids, and if there is no nausea, you may eat a soft diet. Gradually resume your normal diet.    MEDICATIONS:  You will receive instructions for any pain and/or antibiotic prescriptions. Pain medication should be taken only if needed and as directed. Antibiotics should be taken as directed until the entire prescription is completed.    WHEN TO CALL THE DOCTOR:   For any obvious bleeding (some dried blood over the incision is normal).   Redness or swelling around the incision.   Fever over 101ºF (38.4ºC).   Drainage (pus) from the wound.   Persistent pain not relieved by the pain medication.    FOR EMERGENCIES:  If any unusual problems or difficulties occur, contact Dr. Vlad Epstein or the resident at [phone] (page ) or at the Clinic office.

## 2020-07-13 NOTE — PROGRESS NOTES
Plan of care reviewed with pt & spouse,  both verbalized understanding, pt progressing with plan of care, denies nausea, minimal pain in left groin,  tolerating PO, reviewed all DC instructions, home meds, scripts, when to call MD, when to follow-up, answered questions.

## 2020-07-13 NOTE — BRIEF OP NOTE
[facility]  Brief Operative Note    Surgery Date: 7/13/2020     Surgeon(s) and Role:     * Vlad Epstein MD - Primary     * Abdoulaye Davis MD - Resident - Assisting     * Randy Bunn MD - Resident - Assisting    Pre-op Diagnosis:  Inguinal adenopathy [R59.0]    Post-op Diagnosis:  Post-Op Diagnosis Codes:     * Inguinal adenopathy [R59.0]    Procedure(s) (LRB):  EXCISIONAL BIOPSY- LEFT INGUINAL NODE (N/A)    Anesthesia: General/MAC    Description of the findings of the procedure(s): Excisional biopsy of left inguinal lymph node  3x3cm node sent for path   Multiple other palpable lymph nodes in left groin    Estimated Blood Loss: 5mL         Specimens:   Specimen (12h ago, onward)    None            Discharge Note    OUTCOME: Patient tolerated treatment/procedure well without complication and is now ready for discharge.    DISPOSITION: Home or Self Care    FINAL DIAGNOSIS:  <principal problem not specified>    FOLLOWUP: In clinic    DISCHARGE INSTRUCTIONS:    Discharge Procedure Orders   Diet Adult Regular     Notify your health care provider if you experience any of the following:  redness, tenderness, or signs of infection (pain, swelling, redness, odor or green/yellow discharge around incision site)     Notify your health care provider if you experience any of the following:  severe uncontrolled pain     Notify your health care provider if you experience any of the following:  persistent nausea and vomiting or diarrhea     Notify your health care provider if you experience any of the following:  temperature >100.4     No dressing needed   Order Comments: Can shower in 24 hours. No soaking in bath tub or pool for 2 weeks.   Tape over incision will fall off in 10-14 days.     Activity as tolerated

## 2020-07-13 NOTE — H&P (VIEW-ONLY)
History & Physical     SUBJECTIVE:      History of Present Illness:  Mr Burkett is a 70 y.o. male who is accompanied by his daughter after being referred to us by Dr Collins after she requested an incisional biopsy on an enlarged left inguinal lymph node. He reports that his lymph node has been waxing and waning for months after the dog bite and that he didn't quite understand why he was referred to us. I explained that he was going to have a excisional biopsy to remove that enlarged lymph node and provide further detail for treatment. He otherwise feels well although describes himself as a couch potato. His pathology from the incisional biopsy and results of the PET scan are as follows:     6/30/2020: Biopsy results:   Flow cytometric analysis of lymph node detects a kappa restricted B lymphocyte population that expresses CD19, CD20, and CD10.  CD5 is negative.   Flow differential:  Lymphocytes 70.4%, Monocytes 0.9%, Granulocytes  22.2%, Blast  0.4%, Debris/nRBC 1.8%,  Viability 72.6%.   Immunohistochemical stains are performed with adequate controls for greater   sensitivity and further architectural assessment.  CD21/CD23 highlights   disrupted follicular dendritic cell meshworks.    The lymphoid follicles contain CD20-positive B-cells.  The B-cells show expression of CD10 (very weak), BCL6, BCL2 (weak), and high proliferation index (Ki-67 70%).  They are   negative for cyclin D1.  CD3-positive T-cells are seen.  Rare lymphoid cells are positive for EBV by LONNIE in situ hybridization study.      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  Follicular lymphoma is favored.  However, limited sampling precludes definite diagnosis.  Given patient history of kidney transplant, posttransplant lymphoproliferative disorder cannot be excluded.  Excisional biopsy is required.      07/08/20 NM PET SCAN:  In the abdomen and pelvis, there is hypermetabolic lymphadenopathy  throughout the left internal/external iliac chain and left groin.  New left internal iliac chain node measures 3.3 x 3 0 cm with SUV max of 37 (axial fused image 193).  Left inguinal node measures approximately 3.2 x 2.4 cm with SUV max of 36 (axial fused image 218), previously measured up to 1.4 cm.  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  The right kidney appears atrophic with abnormal contour parenchymal calcifications, unchanged.  Right lower quadrant transplant kidney in place.  Stable small bladder diverticulum.  Stable 1.2 cm pancreatic cyst, better characterized on most recent CT with IV contrast.  Stable bilateral probable adrenal adenomas.  Colonic diverticulosis without evidence of acute diverticulitis.  Stable fusiform abdominal aortic ectasia.  No hypermetabolic juan david disease above the diaphragm.  The Deauville score is 5.        Previous HX:        Patient reports in mid-February, he was at a flea market in [place]. He was visiting a vendor and was bitten by the vendor's small dog. It cut through his pants and to the skin but did not draw any blood. Patient noted this dog appeared to be well cared for and was not concerned about potential for rabies.  He is not able to trace the vendor or dog for confirmation that the dog is still healthy.  Afterwards, he developed a painful lymph node in his left groin.  He was given 14 days of cephalexin with resolution in lymph node swelling. A couple of days after completion of antibiotics, the lymph node started enlarging again. After about a week, the lymph node continued to grow in size and became more painful.  He was given a second course of cephalexin for 14 days with decrease in the swelling of the lymph node.  Two days after stopping antibiotics, swelling returned, patient was restarted on another 14 day course of cephalexin.  Patient has just completed his third course of cephalexin with resolution in lymph node swelling.     He has a past medical history of  HTN, hypothyroidism, congenital absence of left kidney, ESRD s/p kidney transplant 11/26/2016 (on tacro, [facility]) c/b AVR s/p thymo 1/2017, uretheral stricture, BPH, bladder diverticulum s/p urethral dilation 7/6, presents for evaluation of recurrent left lymph node swelling.     Patient reports he has received a TDaP 2018.     Patient denies any fevers, chills, sweats, cough, SOB, CP, nvd, abdominal pain, hematuria, dysuria.     Patient denies any recent travel. Patient lives in [place], [place].  Patient has two dogs at home  Denied having any outdoor hobbies, no recent outdoor activities.     Dr. Epstein was able to visit w him today and we scheduled him for an excisional biopsy. All consents were done by the surgical resident w detailed outline of risks, benefits, possible complications, probable solutions and rationale. He expressed understanding and wishes to proceed.           Chief Complaint   Patient presents with    Consult       L inguinal Excisional Biopsy        Review of patient's allergies indicates:  No Known Allergies     Current Medications          Current Outpatient Medications   Medication Sig Dispense Refill    aspirin (ECOTRIN) 81 MG EC tablet Take 1 tablet (81 mg total) by mouth once daily.   0    calcitRIOL (ROCALTROL) 0.5 MCG Cap Take 1 capsule (0.5 mcg total) by mouth once daily. 30 capsule 11    famotidine (PEPCID) 20 MG tablet TAKE 1 TABLET EVERY EVENING 90 tablet 3    ketoconazole (NIZORAL) 200 mg Tab TAKE ONE-HALF (1/2) TABLET ONCE DAILY 45 tablet 3    levothyroxine (SYNTHROID) 100 MCG tablet TAKE 1 TABLET DAILY 90 tablet 3    magnesium oxide (MAG-OX) 400 mg (241.3 mg magnesium) tablet Take 1 tablet (400 mg total) by mouth once daily. 90 tablet 3    metoprolol tartrate (LOPRESSOR) 25 MG tablet TAKE ONE-HALF (1/2) TABLET TWICE A DAY 90 tablet 3    multivitamin (ONE DAILY MULTIVITAMIN) per tablet Take 1 tablet by mouth once daily.        mycophenolate (CELLCEPT) 250 mg Cap Take 3  capsules (750 mg total) by mouth 2 (two) times daily. Z94.0/Kidney transplant on 16. 180 capsule 11    pep injection Inject 0.25 ml as directed.   May increase by 0.05 ml such as 0.30, 0.35 ml etc. Up to 1.00 ml Until adequate result is achieved.     For compounding pharmacy use:   Add PAPAVERINE 30 mcg  Add PHENTOLAMINE 10 mg  Add ALPROSTADIL 100 mcg 1 vial 2    sodium bicarbonate 650 MG tablet Take 1 tablet (650 mg total) by mouth 2 (two) times daily. 540 tablet 3    tacrolimus (PROGRAF) 1 MG Cap Take 3 capsules (3 mg total) by mouth every morning AND 2 capsules (2 mg total) every evening. Z94.0/Kidney Transplant on 16. 150 capsule 11    travoprost (TRAVATAN Z) 0.004 % ophthalmic solution INSTILL 1 DROP INTO EACH EYE ONCE DAILY AT NIGHT          No current facility-administered medications for this visit.                 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  Renal allograft biopsy 17 (DIVINE): 21 glomeruli, none globally sclerosed, <5% interstitial fibrosis, no ACR, c4d negative, AVR CCT Type 2 (V1 lesion); plan THYMO     Dissecting aortic aneurysm (any part), abdominal      Diverticulosis      Encounter for blood transfusion      ESRD (end stage renal disease) 2010    H/O urethral stricture 2018    H/O: urethral stricture      History of AAA (abdominal aortic aneurysm) repair      History of urethral stricture  12/19/2018    Hypertension      Hypokalemia      Hypothyroidism 1/10/2014    Inguinal hernia bilateral, non-recurrent      Kidney stones      Organ transplant candidate 11/26/2013    Plantar warts 1/10/2014    Recurrent UTI 7/28/2017    S/P kidney transplant      Secondary hyperparathyroidism, renal      Thyroid disease             Past Surgical History:   Procedure Laterality Date    ABDOMINAL SURGERY         exploratory lapatomy x 2    ABLATION N/A 8/22/2019     Procedure: ABLATION, SVT;  Surgeon: Emerson Mcmanus MD;  Location: [facility] EP LAB;  Service: Cardiology;  Laterality: N/A;  SVT, RFA, CARTO, anes, GP, 323    AORTA - SUPERIOR MESENTERIC ARTERY BYPASS GRAFT        BLADDER NECK RECONSTRUCTION        BLADDER SURGERY        COLONOSCOPY   10/10/2013     Dr. Gutierrez, repeat in 5 years    CYSTOSCOPY        CYSTOSCOPY N/A 12/19/2018     Procedure: CYSTOSCOPY;  Surgeon: Dewey Mann MD;  Location: [facility] OR 1ST FLR;  Service: Urology;  Laterality: N/A;  45 min    CYSTOURETHROSCOPY WITH DIRECT VISION INTERNAL URETHROTOMY N/A 12/19/2018     Procedure: CYSTOSCOPY, WITH DIRECT VISION INTERNAL URETHROTOMY;  Surgeon: Dewey Mann MD;  Location: [facility] OR 1ST FLR;  Service: Urology;  Laterality: N/A;    DILATION OF URETHRA N/A 12/19/2018     Procedure: DILATION, URETHRA;  Surgeon: Dewey Mann MD;  Location: [facility] OR 1ST FLR;  Service: Urology;  Laterality: N/A;    FLEXIBLE CYSTOSCOPY N/A 11/6/2019     Procedure: CYSTOSCOPY, FLEXIBLE;  Surgeon: Dewey Mann MD;  Location: [facility] OR 2ND FLR;  Service: Urology;  Laterality: N/A;    GASTROJEJUNOSTOMY        HEMORRHOID SURGERY        HERNIA REPAIR        KIDNEY TRANSPLANT        LEFT HEART CATHETERIZATION Left 8/20/2019     Procedure: Left heart cath;  Surgeon: Javan Oscar MD;  Location: [facility] CATH/EP LAB;  Service: Cardiology;  Laterality: Left;    LITHOTRIPSY        LYMPH NODE BIOPSY N/A 6/30/2020     Procedure: BIOPSY, LYMPH NODE;   Surgeon: Ella Diagnostic Provider;  [facility];  Service: General;  Laterality: N/A;  189 lymph node biopsy /ULTRASOUND    PERCUTANEOUS NEPHROLITHOTRIPSY        right  ESWL   10/31/12    right ESWL   6/26/12    URETHROPLASTY USING PATCH GRAFT N/A 11/6/2019     Procedure: URETHROPLASTY, USING PATCH GRAFT BUCCAL MUCOSA GRAFT;  Surgeon: Dewey Mann MD;  [facility];  Service: Urology;  Laterality: N/A;  3 HOURS           Family History   Problem Relation Age of Onset    Diabetes Mother      Alzheimer's disease Mother      Alcohol abuse Father      HIV Brother      Stroke Maternal Aunt      Kidney disease Paternal Uncle      Kidney disease Cousin      No Known Problems Sister      No Known Problems Daughter      No Known Problems Sister      No Known Problems Brother      No Known Problems Brother      Cancer Brother           thyroid cancer    Colon cancer Brother      Melanoma Neg Hx      Psoriasis Neg Hx      Lupus Neg Hx      Eczema Neg Hx      Colon polyps Neg Hx      Crohn's disease Neg Hx      Ulcerative colitis Neg Hx      Celiac disease Neg Hx       Social History            Tobacco Use    Smoking status: Former Smoker       Packs/day: 0.50       Years: 40.00       Pack years: 20.00       Types: Cigarettes       Quit date: 6/16/2010       Years since quitting: 10.0    Smokeless tobacco: Never Used   Substance Use Topics    Alcohol use: Yes       Comment: occasional/social    Drug use: No       Comment: THC in youth         Review of Systems:  Review of Systems   Constitutional: Negative for activity change, appetite change, fever and unexpected weight change.   HENT: Negative.    Eyes: Negative.    Respiratory: Negative.    Cardiovascular: Negative.    Gastrointestinal: Negative.    Endocrine: Negative.    Genitourinary: Negative.    Musculoskeletal: Negative.    Allergic/Immunologic: Negative.    Neurological: Negative.    Hematological: Negative.   "  Psychiatric/Behavioral: Negative.          OBJECTIVE:      Vital Signs (Most Recent)  Temp: 98.4 °F (36.9 °C) (07/09/20 1416)  Pulse: 73 (07/09/20 1416)  BP: 123/83 (07/09/20 1416)  5' 11" (1.803 m)  69.9 kg (154 lb)      Physical Exam:  Physical Exam  Constitutional:       General: He is awake.      Appearance: Normal appearance. He is well-developed.   HENT:      Head: Normocephalic and atraumatic.      Jaw: There is normal jaw occlusion.      Salivary Glands: Right salivary gland is not diffusely enlarged or tender. Left salivary gland is not diffusely enlarged or tender.   Eyes:      General: Lids are normal.      Conjunctiva/sclera: Conjunctivae normal.   Neck:      Musculoskeletal: Full passive range of motion without pain.      Thyroid: No thyroid mass, thyromegaly or thyroid tenderness.      Trachea: Trachea and phonation normal.   Cardiovascular:      Rate and Rhythm: Normal rate and regular rhythm.      Pulses:           Radial pulses are 2+ on the right side and 2+ on the left side.      Heart sounds: Normal heart sounds, S1 normal and S2 normal.      Comments: 1+ Pitting edema in left ankle. New finding for him.   Pulmonary:      Effort: Pulmonary effort is normal.      Breath sounds: Examination of the right-middle field reveals decreased breath sounds. Examination of the right-lower field reveals decreased breath sounds. Decreased breath sounds present.   Abdominal:      General: Abdomen is flat. Bowel sounds are normal.      Palpations: Abdomen is soft.      Tenderness: There is no abdominal tenderness.      Hernia: No hernia is present.      Comments: Multiple well healed incisions from previous surgeries   Musculoskeletal:        Legs:       Comments: ROM WNL, ambulates well, able to get up on table wo assist and easily   Lymphadenopathy:      Cervical: No cervical adenopathy.      Upper Body:      Right upper body: No supraclavicular adenopathy.      Left upper body: No supraclavicular " adenopathy.      Lower Body: Left inguinal adenopathy present.      Comments: Large, non mobile, non tender and hard enlarged lymph node.    Skin:     General: Skin is warm and dry.      Capillary Refill: Capillary refill takes less than 2 seconds.      Comments: tanned   Neurological:      General: No focal deficit present.      Mental Status: He is alert and oriented to person, place, and time.      Coordination: Coordination is intact.   Psychiatric:         Attention and Perception: Attention and perception normal.         Mood and Affect: Mood and affect normal.         Speech: Speech normal.         Behavior: Behavior normal. Behavior is cooperative.         Cognition and Memory: Cognition and memory normal.         Judgment: Judgment normal.            Laboratory  reviewed     Diagnostic Results:  NM PET scan report and image reviewed by both Dr. Epstein and I separately     ASSESSMENT/PLAN:      IMPR: possible B cell lymphoma in L inguinal LN.      PLAN:     1. Schedule excisional biopsy  2. Follow up w Dr. Gould for treatment.      UPDATE: No significant clinical changes have occurred since the above H&P was written by Dr. Johnson. Patient presents for left inguinal lymph node biopsy and understands all associated risks and benefits.     Kyle Teixeira MD  General Surgery PGY-5  Pager: [phone]

## 2020-07-13 NOTE — H&P
History & Physical     SUBJECTIVE:      History of Present Illness:  Mr Burkett is a 70 y.o. male who is accompanied by his daughter after being referred to us by Dr Collins after she requested an incisional biopsy on an enlarged left inguinal lymph node. He reports that his lymph node has been waxing and waning for months after the dog bite and that he didn't quite understand why he was referred to us. I explained that he was going to have a excisional biopsy to remove that enlarged lymph node and provide further detail for treatment. He otherwise feels well although describes himself as a couch potato. His pathology from the incisional biopsy and results of the PET scan are as follows:     6/30/2020: Biopsy results:   Flow cytometric analysis of lymph node detects a kappa restricted B lymphocyte population that expresses CD19, CD20, and CD10.  CD5 is negative.   Flow differential:  Lymphocytes 70.4%, Monocytes 0.9%, Granulocytes  22.2%, Blast  0.4%, Debris/nRBC 1.8%,  Viability 72.6%.   Immunohistochemical stains are performed with adequate controls for greater   sensitivity and further architectural assessment.  CD21/CD23 highlights   disrupted follicular dendritic cell meshworks.    The lymphoid follicles contain CD20-positive B-cells.  The B-cells show expression of CD10 (very weak), BCL6, BCL2 (weak), and high proliferation index (Ki-67 70%).  They are   negative for cyclin D1.  CD3-positive T-cells are seen.  Rare lymphoid cells are positive for EBV by LONNIE in situ hybridization study.      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  Follicular lymphoma is favored.  However, limited sampling precludes definite diagnosis.  Given patient history of kidney transplant, posttransplant lymphoproliferative disorder cannot be excluded.  Excisional biopsy is required.      07/08/20 NM PET SCAN:  In the abdomen and pelvis, there is hypermetabolic lymphadenopathy  throughout the left internal/external iliac chain and left groin.  New left internal iliac chain node measures 3.3 x 3 0 cm with SUV max of 37 (axial fused image 193).  Left inguinal node measures approximately 3.2 x 2.4 cm with SUV max of 36 (axial fused image 218), previously measured up to 1.4 cm.  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  The right kidney appears atrophic with abnormal contour parenchymal calcifications, unchanged.  Right lower quadrant transplant kidney in place.  Stable small bladder diverticulum.  Stable 1.2 cm pancreatic cyst, better characterized on most recent CT with IV contrast.  Stable bilateral probable adrenal adenomas.  Colonic diverticulosis without evidence of acute diverticulitis.  Stable fusiform abdominal aortic ectasia.  No hypermetabolic juan david disease above the diaphragm.  The Deauville score is 5.        Previous HX:        Patient reports in mid-February, he was at a flea market in [place]. He was visiting a vendor and was bitten by the vendor's small dog. It cut through his pants and to the skin but did not draw any blood. Patient noted this dog appeared to be well cared for and was not concerned about potential for rabies.  He is not able to trace the vendor or dog for confirmation that the dog is still healthy.  Afterwards, he developed a painful lymph node in his left groin.  He was given 14 days of cephalexin with resolution in lymph node swelling. A couple of days after completion of antibiotics, the lymph node started enlarging again. After about a week, the lymph node continued to grow in size and became more painful.  He was given a second course of cephalexin for 14 days with decrease in the swelling of the lymph node.  Two days after stopping antibiotics, swelling returned, patient was restarted on another 14 day course of cephalexin.  Patient has just completed his third course of cephalexin with resolution in lymph node swelling.     He has a past medical history of  HTN, hypothyroidism, congenital absence of left kidney, ESRD s/p kidney transplant 11/26/2016 (on tacro, [facility]) c/b AVR s/p thymo 1/2017, uretheral stricture, BPH, bladder diverticulum s/p urethral dilation 7/6, presents for evaluation of recurrent left lymph node swelling.     Patient reports he has received a TDaP 2018.     Patient denies any fevers, chills, sweats, cough, SOB, CP, nvd, abdominal pain, hematuria, dysuria.     Patient denies any recent travel. Patient lives in [place], [place].  Patient has two dogs at home  Denied having any outdoor hobbies, no recent outdoor activities.     Dr. Epstein was able to visit w him today and we scheduled him for an excisional biopsy. All consents were done by the surgical resident w detailed outline of risks, benefits, possible complications, probable solutions and rationale. He expressed understanding and wishes to proceed.           Chief Complaint   Patient presents with    Consult       L inguinal Excisional Biopsy        Review of patient's allergies indicates:  No Known Allergies     Current Medications          Current Outpatient Medications   Medication Sig Dispense Refill    aspirin (ECOTRIN) 81 MG EC tablet Take 1 tablet (81 mg total) by mouth once daily.   0    calcitRIOL (ROCALTROL) 0.5 MCG Cap Take 1 capsule (0.5 mcg total) by mouth once daily. 30 capsule 11    famotidine (PEPCID) 20 MG tablet TAKE 1 TABLET EVERY EVENING 90 tablet 3    ketoconazole (NIZORAL) 200 mg Tab TAKE ONE-HALF (1/2) TABLET ONCE DAILY 45 tablet 3    levothyroxine (SYNTHROID) 100 MCG tablet TAKE 1 TABLET DAILY 90 tablet 3    magnesium oxide (MAG-OX) 400 mg (241.3 mg magnesium) tablet Take 1 tablet (400 mg total) by mouth once daily. 90 tablet 3    metoprolol tartrate (LOPRESSOR) 25 MG tablet TAKE ONE-HALF (1/2) TABLET TWICE A DAY 90 tablet 3    multivitamin (ONE DAILY MULTIVITAMIN) per tablet Take 1 tablet by mouth once daily.        mycophenolate (CELLCEPT) 250 mg Cap Take 3  capsules (750 mg total) by mouth 2 (two) times daily. Z94.0/Kidney transplant on 16. 180 capsule 11    pep injection Inject 0.25 ml as directed.   May increase by 0.05 ml such as 0.30, 0.35 ml etc. Up to 1.00 ml Until adequate result is achieved.     For compounding pharmacy use:   Add PAPAVERINE 30 mcg  Add PHENTOLAMINE 10 mg  Add ALPROSTADIL 100 mcg 1 vial 2    sodium bicarbonate 650 MG tablet Take 1 tablet (650 mg total) by mouth 2 (two) times daily. 540 tablet 3    tacrolimus (PROGRAF) 1 MG Cap Take 3 capsules (3 mg total) by mouth every morning AND 2 capsules (2 mg total) every evening. Z94.0/Kidney Transplant on 16. 150 capsule 11    travoprost (TRAVATAN Z) 0.004 % ophthalmic solution INSTILL 1 DROP INTO EACH EYE ONCE DAILY AT NIGHT          No current facility-administered medications for this visit.                 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  Renal allograft biopsy 17 (DIVINE): 21 glomeruli, none globally sclerosed, <5% interstitial fibrosis, no ACR, c4d negative, AVR CCT Type 2 (V1 lesion); plan THYMO     Dissecting aortic aneurysm (any part), abdominal      Diverticulosis      Encounter for blood transfusion      ESRD (end stage renal disease) 2010    H/O urethral stricture 2018    H/O: urethral stricture      History of AAA (abdominal aortic aneurysm) repair      History of urethral stricture  12/19/2018    Hypertension      Hypokalemia      Hypothyroidism 1/10/2014    Inguinal hernia bilateral, non-recurrent      Kidney stones      Organ transplant candidate 11/26/2013    Plantar warts 1/10/2014    Recurrent UTI 7/28/2017    S/P kidney transplant      Secondary hyperparathyroidism, renal      Thyroid disease             Past Surgical History:   Procedure Laterality Date    ABDOMINAL SURGERY         exploratory lapatomy x 2    ABLATION N/A 8/22/2019     Procedure: ABLATION, SVT;  Surgeon: Emerson Mcmanus MD;  Location: [facility] EP LAB;  Service: Cardiology;  Laterality: N/A;  SVT, RFA, CARTO, anes, GP, 323    AORTA - SUPERIOR MESENTERIC ARTERY BYPASS GRAFT        BLADDER NECK RECONSTRUCTION        BLADDER SURGERY        COLONOSCOPY   10/10/2013     Dr. Gutierrez, repeat in 5 years    CYSTOSCOPY        CYSTOSCOPY N/A 12/19/2018     Procedure: CYSTOSCOPY;  Surgeon: Dewey Mann MD;  Location: [facility] OR 1ST FLR;  Service: Urology;  Laterality: N/A;  45 min    CYSTOURETHROSCOPY WITH DIRECT VISION INTERNAL URETHROTOMY N/A 12/19/2018     Procedure: CYSTOSCOPY, WITH DIRECT VISION INTERNAL URETHROTOMY;  Surgeon: Dewey Mann MD;  Location: [facility] OR 1ST FLR;  Service: Urology;  Laterality: N/A;    DILATION OF URETHRA N/A 12/19/2018     Procedure: DILATION, URETHRA;  Surgeon: Dewey Mann MD;  Location: [facility] OR 1ST FLR;  Service: Urology;  Laterality: N/A;    FLEXIBLE CYSTOSCOPY N/A 11/6/2019     Procedure: CYSTOSCOPY, FLEXIBLE;  Surgeon: Dewey Mann MD;  Location: [facility] OR 2ND FLR;  Service: Urology;  Laterality: N/A;    GASTROJEJUNOSTOMY        HEMORRHOID SURGERY        HERNIA REPAIR        KIDNEY TRANSPLANT        LEFT HEART CATHETERIZATION Left 8/20/2019     Procedure: Left heart cath;  Surgeon: Javan Oscar MD;  Location: [facility] CATH/EP LAB;  Service: Cardiology;  Laterality: Left;    LITHOTRIPSY        LYMPH NODE BIOPSY N/A 6/30/2020     Procedure: BIOPSY, LYMPH NODE;   Surgeon: Ella Diagnostic Provider;  [facility];  Service: General;  Laterality: N/A;  189 lymph node biopsy /ULTRASOUND    PERCUTANEOUS NEPHROLITHOTRIPSY        right  ESWL   10/31/12    right ESWL   6/26/12    URETHROPLASTY USING PATCH GRAFT N/A 11/6/2019     Procedure: URETHROPLASTY, USING PATCH GRAFT BUCCAL MUCOSA GRAFT;  Surgeon: Dewey Mann MD;  [facility];  Service: Urology;  Laterality: N/A;  3 HOURS           Family History   Problem Relation Age of Onset    Diabetes Mother      Alzheimer's disease Mother      Alcohol abuse Father      HIV Brother      Stroke Maternal Aunt      Kidney disease Paternal Uncle      Kidney disease Cousin      No Known Problems Sister      No Known Problems Daughter      No Known Problems Sister      No Known Problems Brother      No Known Problems Brother      Cancer Brother           thyroid cancer    Colon cancer Brother      Melanoma Neg Hx      Psoriasis Neg Hx      Lupus Neg Hx      Eczema Neg Hx      Colon polyps Neg Hx      Crohn's disease Neg Hx      Ulcerative colitis Neg Hx      Celiac disease Neg Hx       Social History            Tobacco Use    Smoking status: Former Smoker       Packs/day: 0.50       Years: 40.00       Pack years: 20.00       Types: Cigarettes       Quit date: 6/16/2010       Years since quitting: 10.0    Smokeless tobacco: Never Used   Substance Use Topics    Alcohol use: Yes       Comment: occasional/social    Drug use: No       Comment: THC in youth         Review of Systems:  Review of Systems   Constitutional: Negative for activity change, appetite change, fever and unexpected weight change.   HENT: Negative.    Eyes: Negative.    Respiratory: Negative.    Cardiovascular: Negative.    Gastrointestinal: Negative.    Endocrine: Negative.    Genitourinary: Negative.    Musculoskeletal: Negative.    Allergic/Immunologic: Negative.    Neurological: Negative.    Hematological: Negative.   "  Psychiatric/Behavioral: Negative.          OBJECTIVE:      Vital Signs (Most Recent)  Temp: 98.4 °F (36.9 °C) (07/09/20 1416)  Pulse: 73 (07/09/20 1416)  BP: 123/83 (07/09/20 1416)  5' 11" (1.803 m)  69.9 kg (154 lb)      Physical Exam:  Physical Exam  Constitutional:       General: He is awake.      Appearance: Normal appearance. He is well-developed.   HENT:      Head: Normocephalic and atraumatic.      Jaw: There is normal jaw occlusion.      Salivary Glands: Right salivary gland is not diffusely enlarged or tender. Left salivary gland is not diffusely enlarged or tender.   Eyes:      General: Lids are normal.      Conjunctiva/sclera: Conjunctivae normal.   Neck:      Musculoskeletal: Full passive range of motion without pain.      Thyroid: No thyroid mass, thyromegaly or thyroid tenderness.      Trachea: Trachea and phonation normal.   Cardiovascular:      Rate and Rhythm: Normal rate and regular rhythm.      Pulses:           Radial pulses are 2+ on the right side and 2+ on the left side.      Heart sounds: Normal heart sounds, S1 normal and S2 normal.      Comments: 1+ Pitting edema in left ankle. New finding for him.   Pulmonary:      Effort: Pulmonary effort is normal.      Breath sounds: Examination of the right-middle field reveals decreased breath sounds. Examination of the right-lower field reveals decreased breath sounds. Decreased breath sounds present.   Abdominal:      General: Abdomen is flat. Bowel sounds are normal.      Palpations: Abdomen is soft.      Tenderness: There is no abdominal tenderness.      Hernia: No hernia is present.      Comments: Multiple well healed incisions from previous surgeries   Musculoskeletal:        Legs:       Comments: ROM WNL, ambulates well, able to get up on table wo assist and easily   Lymphadenopathy:      Cervical: No cervical adenopathy.      Upper Body:      Right upper body: No supraclavicular adenopathy.      Left upper body: No supraclavicular " adenopathy.      Lower Body: Left inguinal adenopathy present.      Comments: Large, non mobile, non tender and hard enlarged lymph node.    Skin:     General: Skin is warm and dry.      Capillary Refill: Capillary refill takes less than 2 seconds.      Comments: tanned   Neurological:      General: No focal deficit present.      Mental Status: He is alert and oriented to person, place, and time.      Coordination: Coordination is intact.   Psychiatric:         Attention and Perception: Attention and perception normal.         Mood and Affect: Mood and affect normal.         Speech: Speech normal.         Behavior: Behavior normal. Behavior is cooperative.         Cognition and Memory: Cognition and memory normal.         Judgment: Judgment normal.            Laboratory  reviewed     Diagnostic Results:  NM PET scan report and image reviewed by both Dr. Epstein and I separately     ASSESSMENT/PLAN:      IMPR: possible B cell lymphoma in L inguinal LN.      PLAN:     1. Schedule excisional biopsy  2. Follow up w Dr. Gould for treatment.      UPDATE: No significant clinical changes have occurred since the above H&P was written by Dr. Johnson. Patient presents for left inguinal lymph node biopsy and understands all associated risks and benefits.     Kyle Teixeira MD  General Surgery PGY-5  Pager: [phone]

## 2020-07-13 NOTE — ANESTHESIA POSTPROCEDURE EVALUATION
Anesthesia Post Evaluation    Patient: Alin Burkett    Procedure(s) Performed: Procedure(s) (LRB):  EXCISIONAL BIOPSY- LEFT INGUINAL NODE (N/A)    Final Anesthesia Type: general    Patient location during evaluation: PACU  Patient participation: Yes- Able to Participate  Level of consciousness: awake and alert  Post-procedure vital signs: reviewed and stable  Pain management: adequate  Airway patency: patent    PONV status at discharge: No PONV  Anesthetic complications: no      Cardiovascular status: hemodynamically stable  Respiratory status: unassisted  Hydration status: euvolemic  Follow-up not needed.          Vitals Value Taken Time   /78 07/13/20 1105   Temp 36.7 °C (98 °F) 07/13/20 1105   Pulse 71 07/13/20 1105   Resp 18 07/13/20 1105   SpO2 100 % 07/13/20 1105         No case tracking events are documented in the log.      Pain/Raheem Score: Raheem Score: 10 (7/13/2020 10:20 AM)

## 2020-07-13 NOTE — OP NOTE
[facility]  Surgery Department  Operative Note       Date of Procedure: 7/13/2020       Surgeon(s):  Surgeon(s) and Role:     * Vlad Epstein MD - Primary     * Abdoulaye Davis MD - Resident - Assisting     * Randy Bunn MD - Resident - Assisting      Pre-Operative Diagnosis:   1. Left Inguinal adenopathy [R59.0]    Post-Operative Diagnosis:   1. Same     Anesthesia: General/MAC    Operative Findings:   1. Largest node excised, submitted for flow and histopath    Procedures:  1. Excision of deep left inguinal node    Estimated Blood Loss (EBL):  <10 mL      Specimen(s):    Specimen (12h ago, onward)    None                 Indications:  Alin Burkett presents for the above procedures.  Risks and benefits were reviewed including bleeding, infection, damage to local structures, seroma, need for additional procedures, death, and imponderables.  He understands and gave informed consent to proceed.    Details:  The patient was transported to the operating room and satisfactory anesthesia established.  Preoperative antibiotics were administered.  The patient was placed in the supine position and extremities were padded and protected throughout.      The operative field was prepped and draped in sterile fashion.  A timeout was performed.  The node on the left groin was palpable and an incision made through a previous scar.  Cautery and scissors were used to dissect circumferentially around the largest node, which was >4 cm.  Clips and ties were used to divide lymphovascular tributaries.  The specimen was bivalved and submitted fresh for flow and histopathology.      The wound was made hemostatic and closed in layers with absorbable sutures.    All needle, lap, and sponge counts were reported as correct.  I communicated the intraoperative findings with the family following the procedure.     Condition: Stable    Disposition: PACU - hemodynamically stable.    Attestation: I was present for and  directed this procedure

## 2020-07-14 NOTE — DISCHARGE SUMMARY
[facility]  General Surgery  Discharge Summary      Patient Name: Alin Burkett  MRN: [id number]  Admission Date: 7/13/2020  Hospital Length of Stay: 0 days  Discharge Date and Time: 7/13/2020 11:19 AM  Attending Physician: No att. providers found   Discharging Provider: Abdoulaye Davis MD  Primary Care Provider: Carmen Krueger MD     HPI: Alin Burkett is a 70 y.o. male with groin adenopathy. Prelim pathology consistent with lymphoma. He presents today for excisional lymph node biopsy.     Procedure(s) (LRB):  EXCISIONAL BIOPSY- LEFT INGUINAL NODE (N/A)     Hospital Course: The patient was admitted and underwent the above listed procedures without apparent complication. Patient discharged home in stable condition.       Consults:     Significant Diagnostic Studies: Labs:   BMP: No results for input(s): GLU, NA, K, CL, CO2, BUN, CREATININE, CALCIUM, MG in the last 48 hours., CMP No results for input(s): NA, K, CL, CO2, GLU, BUN, CREATININE, CALCIUM, PROT, ALBUMIN, BILITOT, ALKPHOS, AST, ALT, ANIONGAP, ESTGFRAFRICA, EGFRNONAA in the last 48 hours., CBC No results for input(s): WBC, HGB, HCT, PLT in the last 48 hours. and INR   Lab Results   Component Value Date    INR 0.9 06/30/2020    INR 1.0 01/31/2020    INR 0.9 01/06/2020       Pending Diagnostic Studies:     Procedure Component Value Units Date/Time    Leukemia/Lymphoma Screen - Lymph Node Has a separate specimen been submitted and ordered for surgical pathology? Yes [[id number]] Collected: 07/13/20 0913    Order Status: Sent Lab Status: In process Updated: 07/13/20 1220    Specimen: Lymph Node     Specimen to Pathology, Surgery General Surgery [[id number]] Collected: 07/13/20 0913    Order Status: Sent Lab Status: In process Updated: 07/13/20 0946        Final Active Diagnoses:    Diagnosis Date Noted POA    Inguinal adenopathy [R59.0] 03/24/2020 Yes      Problems Resolved During this Admission:      Discharged Condition: good    Disposition: Home  or Self Care    Follow Up:  Follow-up Information     Call Vlad Epstein MD.    Specialties: General Surgery, Surgical Oncology  Why: As needed  Contact information:  [address]  [facility] [address] [phone]                 Patient Instructions:      Diet Adult Regular     Notify your health care provider if you experience any of the following:  redness, tenderness, or signs of infection (pain, swelling, redness, odor or green/yellow discharge around incision site)     Notify your health care provider if you experience any of the following:  severe uncontrolled pain     Notify your health care provider if you experience any of the following:  persistent nausea and vomiting or diarrhea     Notify your health care provider if you experience any of the following:  temperature >100.4     No dressing needed   Order Comments: Can shower in 24 hours. No soaking in bath tub or pool for 2 weeks.   Tape over incision will fall off in 10-14 days.     Activity as tolerated     Medications:  Reconciled Home Medications:      Medication List      START taking these medications    HYDROcodone-acetaminophen 5-325 mg per tablet  Commonly known as: NORCO  Take 1 tablet by mouth every 6 (six) hours as needed for Pain.        CONTINUE taking these medications    aspirin 81 MG EC tablet  Commonly known as: ECOTRIN  Take 1 tablet (81 mg total) by mouth once daily.     calcitRIOL 0.5 MCG Cap  Commonly known as: ROCALTROL  Take 1 capsule (0.5 mcg total) by mouth once daily.     famotidine 20 MG tablet  Commonly known as: PEPCID  TAKE 1 TABLET EVERY EVENING     ketoconazole 200 mg Tab  Commonly known as: NIZORAL  TAKE ONE-HALF (1/2) TABLET ONCE DAILY     levothyroxine 100 MCG tablet  Commonly known as: SYNTHROID  TAKE 1 TABLET DAILY     magnesium oxide 400 mg (241.3 mg magnesium) tablet  Commonly known as: MAG-OX  Take 1 tablet (400 mg total) by mouth once daily.     metoprolol tartrate 25 MG tablet  Commonly known  as: LOPRESSOR  TAKE ONE-HALF (1/2) TABLET TWICE A DAY     mycophenolate 250 mg Cap  Commonly known as: CELLCEPT  Take 3 capsules (750 mg total) by mouth 2 (two) times daily. Z94.0/Kidney transplant on 11/26/16.     ONE DAILY MULTIVITAMIN per tablet  Generic drug: multivitamin  Take 1 tablet by mouth once daily.     PEP INJECTION (FOR RX USE)  Inject 0.25 ml as directed.   May increase by 0.05 ml such as 0.30, 0.35 ml etc. Up to 1.00 ml Until adequate result is achieved.    For compounding pharmacy use:   Add PAPAVERINE 30 mcg  Add PHENTOLAMINE 10 mg  Add ALPROSTADIL 100 mcg     sodium bicarbonate 650 MG tablet  Take 1 tablet (650 mg total) by mouth 2 (two) times daily.     tacrolimus 1 MG Cap  Commonly known as: PROGRAF  Take 3 capsules (3 mg total) by mouth every morning AND 2 capsules (2 mg total) every evening. Z94.0/Kidney Transplant on 11/26/16.     travoprost 0.004 % ophthalmic solution  Commonly known as: TRAVATAN Z  INSTILL 1 DROP INTO EACH EYE ONCE DAILY AT NIGHT            Abdoulaye Davis MD  General Surgery  [facility]

## 2020-07-20 PROBLEM — C83.08: Status: ACTIVE | Noted: 2020-01-01

## 2020-07-20 NOTE — PROGRESS NOTES
PROCEDURE NOTE:  Bone Marrow Biopsy  Date: 7/20/2020  Indication: B Cell Lymphoma  Consent: Informed consent was obtained from patient.  Timeout: Done and documented.  Site: left posterior illiac crest.  Prep: Betadine.  Needle used: 11 gauge Jamshidi needle.  Anesthetic: 2% lidocaine 5 cc.  Biopsy: The biopsy needle was introduced into the marrow cavity and an aspirate was obtained without complications. Core biopsy obtained and sent for routine histologic examination and cytogenetics.  Complications: None.  Disposition: The patient was discharged home after applying pressure to site for 15 minutes and being check by an RN.  Minimal blood loss  PARISH Matos NP    Return to clinic in 2 weeks with MD appointment and labs.     Patient is in agreement with the proposed treatment plan. All questions were answered to the patient's satisfaction. Patient knows to call clinic for any new or worsening symptoms and if anything is needed before the next clinic visit.          Amara Matos, FNP-C  Hematology & Medical Oncology   [facility] [place] [address]  ph. [phone]  Fax. [phone]    Patient dicussed with collaborating physician, Dr. Lopez.

## 2020-07-21 PROBLEM — D47.Z1 POST-TRANSPLANT LYMPHOPROLIFERATIVE DISORDER (PTLD): Status: ACTIVE | Noted: 2020-01-01

## 2020-07-21 NOTE — TELEPHONE ENCOUNTER
----- Message from Antwon Alfaro sent at 7/21/2020  4:27 PM CDT -----  Contact: pt  Returning call to     Call back: [phone]

## 2020-07-22 NOTE — TELEPHONE ENCOUNTER
Called patient to inform him of port placement on 07/27 at 1300. Instructed him on: where to go, what time to get here, nothing to eat or drink 6 hours prior procedure, and pre-op COVID testing on 07/24 in [place] after ECHO appointment. Patient verbalized understanding. Asked him to please call us back with any questions or concerns he may have. Will comply.

## 2020-07-22 NOTE — TELEPHONE ENCOUNTER
Coordinator returned patient's call. Patient called to make sure transplant was aware of him starting chemo soon. Patient states he's having his port inserted next Mon & chemo treatments will follow. Informed patient coordinator will inform Dr. Mcclain. Patient instructed to d/c'd Cellcept now. Patient verbalized understanding.     ----- Message from Lydia Landeros sent at 7/22/2020  9:16 AM CDT -----  Pt#[phone]  He wants to speak with you regarding chemo Please call

## 2020-07-24 NOTE — TELEPHONE ENCOUNTER
Preop instructions given again with pt. Instructions/directions give, all questions answered. Pt will go to [facility] after Dr. Gould appt 11:30am

## 2020-07-26 NOTE — ANESTHESIA PREPROCEDURE EVALUATION
[facility]  Anesthesia Pre-Operative Evaluation         Patient Name: Alin Burkett  YOB: 1950  MRN: [id number]    SUBJECTIVE:     Pre-operative evaluation for Procedure(s) (LRB):  INSERTION, VENOUS ACCESS PORT (N/A)     2020    Alin Burkett is a 70 y.o. male w/ a significant PMHx of hypertension, LOUISA, SVT status post ablation, acquired hypothyroidism, LVH, abdominal aortic aneurysm without rupture, anemia of chronic disease, anemia, congenital absence of kidney, immunocompromised state status post  donor kidney transplant who presents for the above procedure.    Patient now presents for the above procedure(s).      LDA: None documented.       Prev airway: Placement Time: 806; Method of Intubation: Direct laryngoscopy; Inserted by: CRNA; Airway Device: Endotracheal Tube; Mask Ventilation: Easy - oral; Intubated: Postinduction; Blade: Dotson #2; Airway Device Size: 7.5; Style: Cuffed; Cuff Inflation: Minimal occlusive pressure; Inflation Amount: 7; Placement Verified By: Auscultation, Capnometry, ETT Condensation; Grade: Grade I; Complicating Factors: None; Intubation Findings: Positive EtCO2, Bilateral breath sounds, Atraumatic/Condition of teeth unchanged;  Depth of Insertion: 23; Securment: Lips; Complications: None; Breath Sounds: Equal Bilateral; Insertion Attempts: 1;    Drips: None documented.      Patient Active Problem List   Diagnosis    Adrenal adenoma    Essential hypertension    Congenital absence of kidney    Anemia of chronic disease    Secondary hyperparathyroidism, renal    Acquired hypothyroidism    Plantar warts    History of smoking 10-25 pack years, quit , 20 pack-years    History of alcohol abuse, quit     LOUISA (obstructive sleep apnea), CPAP refused    LVH (left ventricular hypertrophy) due to hypertensive disease    BPH (benign prostatic hyperplasia)    Immunosuppressive management  encounter following kidney transplant    -donor kidney transplant    S/P kidney transplant    Hypophosphatemia    Stage 3 chronic kidney disease    Stricture of anterior urethra in male    Bladder diverticulum    Thrombocytopenia    Abdominal aortic atherosclerosis    Abdominal aortic aneurysm (AAA) without rupture    SVT (supraventricular tachycardia)    Rectal bleeding    Viral URI with cough    Immunocompromised state    Inguinal adenopathy    Lymphadenopathy    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      Current Outpatient Medications on File Prior to Encounter   Medication Sig Dispense Refill    calcitRIOL (ROCALTROL) 0.5 MCG Cap Take 1 capsule (0.5 mcg total) by mouth once daily. 30 capsule 11    famotidine (PEPCID) 20 MG tablet TAKE 1 TABLET EVERY EVENING 90 tablet 3    ketoconazole (NIZORAL) 200 mg Tab TAKE ONE-HALF (1/2) TABLET ONCE DAILY 45 tablet 3    levothyroxine (SYNTHROID) 100 MCG tablet TAKE 1 TABLET DAILY 90 tablet 3    magnesium oxide (MAG-OX) 400 mg (241.3 mg magnesium) tablet Take 1 tablet (400 mg total) by mouth once daily. 90 tablet 3    metoprolol tartrate (LOPRESSOR) 25 MG tablet TAKE ONE-HALF (1/2) TABLET TWICE A DAY 90 tablet 3    multivitamin (ONE DAILY MULTIVITAMIN) per tablet Take 1 tablet by mouth once daily.      sodium bicarbonate 650 MG tablet Take 1 tablet (650 mg total) by mouth 2 (two) times daily. 540 tablet 3    tacrolimus (PROGRAF) 1 MG Cap Take 3 capsules (3 mg total) by mouth every morning AND 2 capsules (2 mg total) every evening. Z94.0/Kidney Transplant on 16. 150 capsule 11    aspirin (ECOTRIN) 81 MG EC tablet Take 1 tablet (81 mg total) by mouth once  daily.  0    HYDROcodone-acetaminophen (NORCO) 5-325 mg per tablet Take 1 tablet by mouth every 6 (six) hours as needed for Pain. 5 tablet 0    LORazepam (ATIVAN) 0.5 MG tablet Take 1 tablet (0.5 mg total) by mouth once. 30 minutes before procedure for 1 dose 2 tablet 0    pep injection Inject 0.25 ml as directed.   May increase by 0.05 ml such as 0.30, 0.35 ml etc. Up to 1.00 ml Until adequate result is achieved.    For compounding pharmacy use:   Add PAPAVERINE 30 mcg  Add PHENTOLAMINE 10 mg  Add ALPROSTADIL 100 mcg 1 vial 2    travoprost (TRAVATAN Z) 0.004 % ophthalmic solution INSTILL 1 DROP INTO EACH EYE ONCE DAILY AT NIGHT         Past Surgical History:   Procedure Laterality Date    ABDOMINAL SURGERY      exploratory lapatomy x 2    ABLATION N/A 8/22/2019    Procedure: ABLATION, SVT;  Surgeon: Emerson Mcmanus MD;  Location: [facility] EP LAB;  Service: Cardiology;  Laterality: N/A;  SVT, RFA, CARTO, anes, GP, 323    AORTA - SUPERIOR MESENTERIC ARTERY BYPASS GRAFT      BLADDER NECK RECONSTRUCTION      BLADDER SURGERY      COLONOSCOPY  10/10/2013    Dr. Gutierrez, repeat in 5 years    CYSTOSCOPY      CYSTOSCOPY N/A 12/19/2018    Procedure: CYSTOSCOPY;  Surgeon: Dewey Mann MD;  Location: [address];  Service: Urology;  Laterality: N/A;  45 min    CYSTOURETHROSCOPY WITH DIRECT VISION INTERNAL URETHROTOMY N/A 12/19/2018    Procedure: CYSTOSCOPY, WITH DIRECT VISION INTERNAL URETHROTOMY;  Surgeon: Dewey Mann MD;  Location: [address];  Service: Urology;  Laterality: N/A;    DILATION OF URETHRA N/A 12/19/2018    Procedure: DILATION, URETHRA;  Surgeon: Dewey Mann MD;  [facility];  Service: Urology;  Laterality: N/A;    EXCISIONAL BIOPSY N/A 7/13/2020    Procedure: EXCISIONAL BIOPSY- LEFT INGUINAL NODE;  Surgeon: Vlad Epstein MD;  Location: [facility] OR 2ND FLR;  Service: General;  Laterality: N/A;    FLEXIBLE CYSTOSCOPY N/A 11/6/2019    Procedure: CYSTOSCOPY, FLEXIBLE;   Surgeon: Dewey Mann MD;  Location: [address];  Service: Urology;  Laterality: N/A;    GASTROJEJUNOSTOMY      HEMORRHOID SURGERY      HERNIA REPAIR      KIDNEY TRANSPLANT      LEFT HEART CATHETERIZATION Left 8/20/2019    Procedure: Left heart cath;  Surgeon: Javan Oscar MD;  Location: [facility] CATH/EP LAB;  Service: Cardiology;  Laterality: Left;    LITHOTRIPSY      LYMPH NODE BIOPSY N/A 6/30/2020    Procedure: BIOPSY, LYMPH NODE;  [facility];  Location: [address];  Service: General;  Laterality: N/A;  189 lymph node biopsy /ULTRASOUND    PERCUTANEOUS NEPHROLITHOTRIPSY      right  ESWL  10/31/12    right ESWL  6/26/12    URETHROPLASTY USING PATCH GRAFT N/A 11/6/2019    Procedure: URETHROPLASTY, USING PATCH GRAFT BUCCAL MUCOSA GRAFT;  Surgeon: Dewey Mann MD;  Location: [address];  Service: Urology;  Laterality: N/A;  3 HOURS       Social History     Socioeconomic History    Marital status: Single     Spouse name: Not on file    Number of children: Not on file    Years of education: Not on file    Highest education level: Not on file   Occupational History     Employer: Disabled   Social Needs    Financial resource strain: Not on file    Food insecurity     Worry: Not on file     Inability: Not on file    Transportation needs     Medical: Not on file     Non-medical: Not on file   Tobacco Use    Smoking status: Former Smoker     Packs/day: 0.50     Years: 40.00     Pack years: 20.00     Types: Cigarettes     Quit date: 6/16/2010     Years since quitting: 10.1    Smokeless tobacco: Never Used   Substance and Sexual Activity    Alcohol use: Yes     Comment: occasional/social    Drug use: No     Comment: THC in youth    Sexual activity: Yes     Partners: Female     Birth control/protection: None   Lifestyle    Physical activity     Days per week: Not on file     Minutes per session: Not on file    Stress: Not on file   Relationships    Social  connections     Talks on phone: Not on file     Gets together: Not on file     Attends [employer] service: Not on file     Active member of club or organization: Not on file     Attends meetings of clubs or organizations: Not on file     Relationship status: Not on file   Other Topics Concern    Not on file   Social History Narrative    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       OBJECTIVE:     Vital Signs Range (Last 24H):  Temp:  [36.9 °C (98.4 °F)-37 °C (98.6 °F)]   Pulse:  [59-64]   Resp:  [16]   BP: (132-134)/(80-83)   SpO2:  [98 %-100 %]       Significant Labs:  Lab Results   Component Value Date    WBC 7.25 07/27/2020    HGB 12.6 (L) 07/27/2020    HCT 40.5 07/27/2020     07/27/2020    CHOL 150 06/05/2020    TRIG 99 06/05/2020    HDL 50 06/05/2020    ALT 21 07/27/2020    AST 26 07/27/2020     07/27/2020    K 4.0 07/27/2020     (H) 07/27/2020    CREATININE 1.8 (H) 07/27/2020    BUN 26 (H) 07/27/2020    CO2 26 07/27/2020    TSH 15.087 (H) 01/06/2020    PSA 0.41 10/18/2016    INR 0.9 06/30/2020    HGBA1C 5.3 01/06/2020       Diagnostic Studies: No relevant studies.    EKG:   Results for orders placed or performed during the hospital encounter of 01/31/20   EKG 12-lead    Collection Time: 01/31/20  5:49 PM    Narrative    Test Reason : R00.0,    Vent. Rate : 105 BPM     Atrial Rate : 105 BPM     P-R Int : 178 ms          QRS Dur : 086 ms      QT Int : 308 ms       P-R-T Axes : 068 066 122 degrees     QTc Int : 407 ms    Age and gender specific analysis  Sinus tachycardia  LVH with repolarization abnormality  Abnormal ECG  When compared with ECG of 30-OCT-2019 14:37,  GA interval has decreased  Vent. rate has increased BY  38 BPM  Nonspecific T wave abnormality, worse in Inferior leads  Confirmed by Rudy Joyce MD ([id number]) on 2/1/2020 11:35:11 AM    Referred By: [email or web address]   SELF           Confirmed By:Rudy Joyce MD       2D ECHO:  TTE:  Results for orders placed or performed in visit on  07/24/20   Echo Color Flow Doppler? Yes   Result Value Ref Range    BSA 1.87 m2    TDI SEPTAL 0.06 m/s    LV LATERAL E/E' RATIO 11.83 m/s    LV SEPTAL E/E' RATIO 11.83 m/s    LA WIDTH 3.92 cm    Right Atrial Pressure (from IVC) 3 mmHg    TDI LATERAL 0.06 m/s    PV PEAK VELOCITY 1.02 cm/s    LVIDD 3.60 3.5 - 6.0 cm    IVS 1.34 (A) 0.6 - 1.1 cm    PW 1.55 (A) 0.6 - 1.1 cm    LVIDS 1.71 (A) 2.1 - 4.0 cm    FS 53 28 - 44 %    LA volume 45.06 cm3    Sinus 2.79 cm    STJ 3.02 cm    Ascending aorta 2.93 cm    LV mass 189.22 g    LA size 2.75 cm    RVDD 3.03 cm    TAPSE 1.56 cm    RV S' 9.40 cm/s    Left Ventricle Relative Wall Thickness 0.86 cm    AV mean gradient 13 mmHg    AV valve area 3.60 cm2    AV Velocity Ratio 0.82     AV index (prosthetic) 0.91     MV valve area p 1/2 method 3.13 cm2    E/A ratio 1.37     Mean e' 0.06 m/s    E wave decelartion time 242.49 msec    IVRT 69.20 msec    Pulm vein S/D ratio 0.97     LVOT diameter 2.24 cm    LVOT area 3.9 cm2    LVOT peak philippe 2.00 m/s    LVOT peak VTI 39.42 cm    Ao peak philippe 2.44 m/s    Ao VTI 43.13 cm    LVOT stroke volume 155.27 cm3    AV peak gradient 24 mmHg    TV rest pulmonary artery pressure 36 mmHg    E/E' ratio 11.83 m/s    MV Peak E Philippe 0.71 m/s    TR Max Philippe 2.86 m/s    MV stenosis pressure 1/2 time 70.32 ms    MV Peak A Philippe 0.52 m/s    PV Peak S Philippe 0.38 m/s    PV Peak D Philippe 0.39 m/s    LV Systolic Volume 8.58 mL    LV Systolic Volume Index 4.5 mL/m2    LV Diastolic Volume 54.55 mL    LV Diastolic Volume Index 28.90 mL/m2    LA Volume Index 23.9 mL/m2    LV Mass Index 100 g/m2    RA Major Axis 3.89 cm    Left Atrium Minor Axis 5.12 cm    Left Atrium Major Axis 4.73 cm    Triscuspid Valve Regurgitation Peak Gradient 33 mmHg    RA Width 3.62 cm    Narrative    · Concentric left ventricular remodeling.  · Hyperdynamic left ventricular systolic function. The estimated ejection   fraction is 85%.  · The left ventricular global longitudinal strain is -15%.  reduced  · No wall motion abnormalities.  · Grade I (mild) left ventricular diastolic dysfunction consistent with   impaired relaxation.  · Normal right ventricular systolic function.  · Mild right atrial enlargement.  · Mild mitral regurgitation.  · Mild tricuspid regurgitation.  · Normal central venous pressure (3 mmHg).  · The estimated PA systolic pressure is 36 mmHg.  · Systolic anterior motion of the anterior leaflet of the Mitral valve is   noted.  · Since previous echo of 8/20/19, there is now evidence of FRANCISCO.          RAUL:  No results found. However, due to the size of the patient record, not all encounters were searched. Please check Results Review for a complete set of results.    ASSESSMENT/PLAN:         Anesthesia Evaluation    I have reviewed the Patient Summary Reports.    I have reviewed the Nursing Notes. I have reviewed the NPO Status.   I have reviewed the Medications.     Review of Systems  Anesthesia Hx:  No problems with previous Anesthesia Denies Hx of Anesthetic complications  History of prior surgery of interest to airway management or planning: Denies Family Hx of Anesthesia complications.   Denies Personal Hx of Anesthesia complications.   Social:  Former Smoker    Hematology/Oncology:        Current/Recent Cancer.   EENT/Dental:EENT/Dental Normal   Cardiovascular:   Exercise tolerance: good Hypertension Valvular problems/Murmurs Dysrhythmias CHF ECG has been reviewed. Recent elevation of Troponins in 8/2019 in the setting of SVT, s/p Ablation during that admission    Left heart cath 8/2019 Normal coronaries with sluggish flow in the LAD and the RCA    Hypertrophic subaortic  Stenosis, FRANCISCO noted on recent TTE    Previous AAA repair   Pulmonary:   Sleep Apnea    Renal/:   Chronic Renal Disease, CRI S/p Kidney Transplant 3 years ago   Hepatic/GI:  Hepatic/GI Normal    Musculoskeletal:  Musculoskeletal Normal    Neurological:  Neurology Normal    Endocrine:   Hypothyroidism     Dermatological:  Skin Normal    Psych:  Psychiatric Normal           Physical Exam  General:  Well nourished    Airway/Jaw/Neck:  Airway Findings: Mouth Opening: Normal Tongue: Normal  General Airway Assessment: Adult, Possible difficult mask airway  Mallampati: III  Improves to II with phonation.  TM Distance: Normal, at least 6 cm  Jaw/Neck Findings:  Neck ROM: Normal ROM     Eyes/Ears/Nose:  EYES/EARS/NOSE FINDINGS: Normal   Dental:  Dental Findings:    Chest/Lungs:  Chest/Lungs Clear    Heart/Vascular:  Heart Findings: Normal Heart murmur: negative Vascular Findings:  Dialysis Access: AV Fistula LUE  Vascular Exam Findings: No thrill or bruit through LUE AVF     Abdomen:  Abdomen Findings: Normal    Musculoskeletal:  Musculoskeletal Findings: Normal   Skin:  Skin Findings: Normal    Mental Status:  Mental Status Findings:  Cooperative, Alert and Oriented         Anesthesia Plan  Type of Anesthesia, risks & benefits discussed:  Anesthesia Type:  general, MAC  Patient's Preference:   Intra-op Monitoring Plan: standard ASA monitors  Intra-op Monitoring Plan Comments:   Post Op Pain Control Plan: multimodal analgesia, IV/PO Opioids PRN and per primary service following discharge from PACU  Post Op Pain Control Plan Comments:   Induction:   rapid sequence, Inhalation and IV  Beta Blocker:  Patient is on a Beta-Blocker and has received one dose within the past 24 hours (No further documentation required).       Informed Consent: Patient understands risks and agrees with Anesthesia plan.  Questions answered. Anesthesia consent signed with patient.  ASA Score: 3     Day of Surgery Review of History & Physical: I have interviewed and examined the patient. I have reviewed the patient's H&P dated:            Ready For Surgery From Anesthesia Perspective.

## 2020-07-27 PROBLEM — C83.00 SMALL CELL B-CELL LYMPHOMA: Status: ACTIVE | Noted: 2020-01-01

## 2020-07-27 NOTE — Clinical Note
When you educate him, can you also ask him to  allopurinol? His uric acid came back pretty elevated yesterday.

## 2020-07-27 NOTE — DISCHARGE INSTRUCTIONS
Vascular Access Port Implantation   Port implantation is surgery to place (implant) a port under the skin. For vascular access, it is placed into a vein. The port allows medicines or nutrition to be sent right into your bloodstream. Blood can also be taken or given through the port. During the procedure, a long, thin tube called a catheter is threaded into one of your large veins. The tube is then attached to the port. This usually sits under the skin of your chest and causes a small bump. To use the port, a special needle is passed through your skin and into the port. The needle can stay in your skin for up to 7 days, if needed. A port can stay in place for weeks or months or longer.    Why is a vascular access port needed?  A vascular access port may allow healthcare providers to give you:  · Chemotherapy or other cancer-fighting drugs  · IV treatments, such as antibiotics or nutrition  · Hemodialysis (for kidney failure)  The port may also be used to draw blood.  Before the procedure  Follow any instructions you are given on how to prepare.  Tell your provider about any medicines you are taking. This includes:  · All prescription medicines  · Over-the-counter medicines such as aspirin or ibuprofen  · Herbs, vitamins, and other supplements  Also be sure your provider knows:  · If you are pregnant or think you may be pregnant  · If you are allergic to any medicines or substances, especially local anesthetics or iodine  · Your full medical history, including why you will need the port  · If you plan on doing any contact sports  During the procedure  · Before the procedure, an IV may be put into a vein in your arm or hand. This gives you fluids and medicines. You may be given medicine through the IV to help you relax during the procedure. This is called sedation. But some surgeons place ports using general anesthesia.  · The chest is used most often for the port. In some cases, your belly (abdomen) or arm will be  used instead.  · The skin over the insertion area is numbed with local anesthetic.  · Ultrasound or X-rays are used to help the healthcare provider guide the catheter into the proper location during the procedure.  · A cut (incision) is made in the skin where the port will be placed. A small pocket for the port is formed under the skin.  · A second small incision is made in the skin near the first incision. A tunnel under the skin is created. The catheter is put through the tunnel and into the blood vessel.  · The skin is closed over the port. It is held shut with stitches (sutures) or surgical glue or tape. The second small incision is also closed.  · A chest X-ray may be done to make sure the port is placed properly.  After the procedure  You may be taken to a recovery room where youll recover from the sedation. Nurses will check on you as you rest. If you have pain, nurses can give you medicine. If you are not staying in the hospital overnight, you will be sent home a few hours after the procedure is done. A healthcare provider will tell you when you can go home. An adult family member or friend will need to drive you home.  Recovering at home  · Take pain medicine as directed by your healthcare provider.  · Take it easy for 24 hours after the procedure. Avoid physical activity and heavy lifting until your healthcare provider says its OK.  · Keep the port clean and dry. Ask when you can shower again. You will need to keep the port dry by covering it when you shower.  · Care for the insertion site as you are directed.  · Dont swim, bathe, or do other activities that cause water to cover the insertion site.  · To keep the port from getting blocked with blood clots, flush it as often as directed. You should be shown the proper way to flush the port before you go home. It is important to follow these directions.     Risks and possible complications of implantation  · Bleeding  · Infection of the insertion  site  · Damage to a blood vessel  · Nerve injury or irritation  · Collapsed lung (for chest port placements)  · Skin breakdown over the port  Risks and possible complications of having a port  · Blocked  port or catheter  · Leakage or breakage of the port or catheter  · The port moves out of position  · Blood clot  · Skin or bloodstream infection  · Skin breakdown over the port      When to seek medical care  Call your healthcare provider right away if you have any of the following:  · A fever of 100.4°F (38.0°C) or higher  · You can't access or use the port properly  · You can't flush the port or get a blood return  · The skin near the port is red, warm, swollen, or broken  · You have shoulder pain on the side where the port is located  · You feel a heart flutter or racing heart   · Swollen arm, if the port is placed in your arm   Date Last Reviewed: 7/1/2016  © 7918-0386 [employer]. [address] [place] [address], [place] [address]. All rights reserved. This information is not intended as a substitute for professional medical care. Always follow your healthcare professional's instructions.

## 2020-07-27 NOTE — PLAN OF CARE
Discharge instructions given and explained to patient and significant other with verbalization of understanding all instructions. CXR cleared by MD Oni. Patients v/s stable, denies n/v and tolerating po, rates pain level tolerable, IV removed, and significant other at bedside for patient discharge home.

## 2020-07-27 NOTE — INTERVAL H&P NOTE
The patient has been examined and the H&P has been reviewed:    Presents today for port a cath placement. No changes since previous exam.    I concur with the findings and no changes have occurred since H&P was written.    Anesthesia/Surgery risks, benefits and alternative options discussed and understood by patient/family.          Active Hospital Problems    Diagnosis  POA    Small cell B-cell lymphoma [C83.00]  Yes      Resolved Hospital Problems   No resolved problems to display.

## 2020-07-27 NOTE — Clinical Note
Please schedule labs (CBC, CMP, type and screen, LDH, phos, uric acid) in [place] on 8/7, 8/14, 8/20. Follow-up on 8/21 with chemo same day for Cycle 2 R-CHOP

## 2020-07-27 NOTE — OP NOTE
[facility]  Surgery Department  Operative Note    SUMMARY     Date of Procedure: 7/27/2020     Procedure: Procedure(s) (LRB):  INSERTION, VENOUS ACCESS PORT (Left)     Surgeon(s) and Role:     * Vlad Epstein MD - Primary     * Randy Bunn MD - Resident - Assisting        Pre-Operative Diagnosis: Lymphoma    Post-Operative Diagnosis: Same, Chronic thrombosis RIJ    Anesthesia: Local/MAC     Estimated Blood Loss (EBL):  <10 mL    Procedures:     1.  Left Internal Jugular Tunneled Central Venous Catheter with Subcutaneous Port (PowerPort)  2.  Intraoperative Fluoroscopy for Port Placement    Indication:  Alin Burkett is a 70 y.o. male in need of port placement for central venous access.  Risks and benefits of port placement including bleeding, infection, hemothorax, pneumothorax, death, port malfunction, need for additional procedures and imponderables were all reviewed prior to the procedure;  he agrees to proceed.    Details: After informed consent, the patient was transported to the operating room.  Following satisfactory anesthesia, the patient was positioned supine, Trendelenburg position, and the arms tucked and padded.  The operative field was prepped and draped in sterile fashion.  Local anesthetic was infused about the port site.  The ultrasound was used to survey the internal jugular veins.  The right side was chronically thrombosed.  The left internal jugular vein was confirmed by easy compression and respiratory variation.  Under ultrasound guidance, an access needle was passed into the internal jugular vein with easy return of dark red, non-pulsatile blood.  A wire was passed into the superior vena cava under fluoroscopic guidance.   A tunnel was created to the chest wall, where an incision and pocket were created with a scalpel and electrocautery.  The port was sutured to the pectoral fascia with absorbable suture.  Sheath and dilator assembly were passed over the  wire under fluoroscopic guidance, and the wire/dilator removed.  A catheter was passed through the tunnel, into the sheath, measured and cut to length under fluoroscopic guidance.  The sheath was peeled away in its entirety.  The fluoroscopic images were interpreted by me and saved on PACS.  The catheter and port were joined, de-aired, aspirated and flushed with easy return of dark red, non-pulsatile blood.  A heparin solution was instilled.  Wounds were closed using absorbable suture, derma-bond and steri-strips.  The patient recovered from anesthesia and was taken to the recovery room in stable condition.  I was present for and performed the entirety of the procedure.  All needle, lap, and sponge counts were reported as correct.  I communicated the intraoperative findings with the family following the procedure.        Implants:   Implant Name Type Inv. Item Serial No.  Lot No. LRB No. Used Action   KIT POWERPORT SINGLE 8FR - [id number]  KIT POWERPORT SINGLE 8FR  [employer] [id number] Left 1 Implanted       Specimens:   Specimen (12h ago, onward)    None                Condition: Stable    Disposition: PACU - hemodynamically stable.    Attestation: I was present and scrubbed for the key portions of the procedure.

## 2020-07-27 NOTE — BRIEF OP NOTE
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

## 2020-07-27 NOTE — TRANSFER OF CARE
"Anesthesia Transfer of Care Note    Patient: Alin Burkett    Procedure(s) Performed: Procedure(s) (LRB):  INSERTION, VENOUS ACCESS PORT (Left)    Patient location: PACU    Anesthesia Type: general    Transport from OR: Transported from OR on 6-10 L/min O2 by face mask with adequate spontaneous ventilation    Post pain: adequate analgesia    Post assessment: no apparent anesthetic complications    Post vital signs: stable    Level of consciousness: awake, alert and oriented    Nausea/Vomiting: no nausea/vomiting    Complications: none    Transfer of care protocol was followed      Last vitals:   Visit Vitals  /79 (BP Location: Left arm, Patient Position: Lying)   Pulse 61   Temp 36.9 °C (98.4 °F) (Temporal)   Resp 18   Ht 5' 11" (1.803 m)   Wt 70.3 kg (155 lb)   SpO2 100%   BMI 21.62 kg/m²     "

## 2020-07-28 PROBLEM — E79.0 HYPERURICEMIA: Status: ACTIVE | Noted: 2020-01-01

## 2020-07-28 NOTE — TELEPHONE ENCOUNTER
----- Message from SHABBIR CRUMP sent at 7/27/2020  4:01 PM CDT -----  Regarding: Pharmacy visit tomorrow or Wed  Set for whatever time is best for the patient for chemo counseling - phone or virtual

## 2020-07-28 NOTE — PROGRESS NOTES
HEMATOLOGIC MALIGNANCIES PROGRESS NOTE    IDENTIFYING STATEMENT   Alin Burkett (Alin Burkett) is a 70 y.o. male with a  of 1950 from [place] with the diagnosis of post-translpant lymphoproliferative disorder.      ONCOLOGY HISTORY:    1. Monomorphic post-transplant lymphoproliferative disorder   A. 2020: Noticed left inguinal lymphadenopathy after a dog bite (failed to resolve with antibiotics)              B. 2020: Saw Dr. Collins in infectious diseases for inguinal lymphadenopathy - referred for biopsy              C. 2020: Core biopsy of L inguinal lymph node shows B-cell lymphoma of germinal center origin; EBV-positive by LONNIE; morphology nondiagnostic of PTLD vs follicular lymphoma              D. 2020: PET/CT shows left internal iliac chain node measuring 3.3 x 3 cm with SUV max 37; L inguinal node measures 3.2 x 2.4 cm with SUV max 36   E. 2020: Excisional biopsy of left inguinal node shows monomorphic post-transplant lymphoproliferative disorder (DLBCL, GCB 60%, follicular lymphoma, grade 3B 40%); FISH for MYC rearrangement is negative   F. 2020: Bone marrow biopsy shows no evidence of B-cell lymphoma    2. History of alcohol abuse, quit   3. Hypertension, complicated by left ventricular hypertrophy  4. Supraventricular tachycardia  5. Abdominal aortic atherosclerosis with aneurysm  6. S/p kidney transplant 2016 c/b AVR s/p thymo 2017  7. Benign prostatic hyperplasia  8. Secondary hyperparathryoidism  9. Obstructive sleep apnea    INTERVAL HISTORY:      Mr. Burkett returns to clinic for follow-up of his monomorphic PTLD. This is his first in-person visit since his full diagnosis. He is going to have port placement by surgery this afternoon.     He is feeling okay. He is accompanied by his wife, and they have many questions about his upcoming therapy.     Past Medical History, Past Social History and Past Family History have been reviewed and are unchanged except  as noted in the interval history.    MEDICATIONS:     Current Outpatient Medications on File Prior to Visit   Medication Sig Dispense Refill    aspirin (ECOTRIN) 81 MG EC tablet Take 1 tablet (81 mg total) by mouth once daily.  0    calcitRIOL (ROCALTROL) 0.5 MCG Cap Take 1 capsule (0.5 mcg total) by mouth once daily. 30 capsule 11    COMBIGAN 0.2-0.5 % Drop INSTILL 1 DROP INTO EACH EYE TWICE DAILY      famotidine (PEPCID) 20 MG tablet TAKE 1 TABLET EVERY EVENING 90 tablet 3    HYDROcodone-acetaminophen (NORCO) 5-325 mg per tablet Take 1 tablet by mouth every 6 (six) hours as needed for Pain. 5 tablet 0    ketoconazole (NIZORAL) 200 mg Tab TAKE ONE-HALF (1/2) TABLET ONCE DAILY 45 tablet 3    levothyroxine (SYNTHROID) 100 MCG tablet TAKE 1 TABLET DAILY 90 tablet 3    magnesium oxide (MAG-OX) 400 mg (241.3 mg magnesium) tablet Take 1 tablet (400 mg total) by mouth once daily. 90 tablet 3    metoprolol tartrate (LOPRESSOR) 25 MG tablet TAKE ONE-HALF (1/2) TABLET TWICE A DAY 90 tablet 3    multivitamin (ONE DAILY MULTIVITAMIN) per tablet Take 1 tablet by mouth once daily.      pep injection Inject 0.25 ml as directed.   May increase by 0.05 ml such as 0.30, 0.35 ml etc. Up to 1.00 ml Until adequate result is achieved.    For compounding pharmacy use:   Add PAPAVERINE 30 mcg  Add PHENTOLAMINE 10 mg  Add ALPROSTADIL 100 mcg 1 vial 2    sodium bicarbonate 650 MG tablet Take 1 tablet (650 mg total) by mouth 2 (two) times daily. 540 tablet 3    tacrolimus (PROGRAF) 1 MG Cap Take 3 capsules (3 mg total) by mouth every morning AND 2 capsules (2 mg total) every evening. Z94.0/Kidney Transplant on 11/26/16. 150 capsule 11    travoprost (TRAVATAN Z) 0.004 % ophthalmic solution INSTILL 1 DROP INTO EACH EYE ONCE DAILY AT NIGHT      LORazepam (ATIVAN) 0.5 MG tablet Take 1 tablet (0.5 mg total) by mouth once. 30 minutes before procedure for 1 dose 2 tablet 0     Current Facility-Administered Medications on File Prior  to Visit   Medication Dose Route Frequency Provider Last Rate Last Dose    [COMPLETED] metoprolol tartrate (LOPRESSOR) split tablet 12.5 mg  12.5 mg Oral Once Sundar Brown MD   12.5 mg at 07/27/20 1315    [COMPLETED] oxyCODONE immediate release tablet 10 mg  10 mg Oral Once PRN Randy Bunn MD   10 mg at 07/27/20 1614    [DISCONTINUED] 0.9%  NaCl infusion   Intravenous Continuous Vlad Epstein MD   Stopped at 07/27/20 1608    [DISCONTINUED] ceFAZolin injection 2 g  2 g Intravenous On Call Procedure Vlad Epstein MD        [DISCONTINUED] ePHEDrine sulfate    PRN Sundar Brown MD   5 mg at 07/27/20 1515    [DISCONTINUED] fentaNYL injection    PRN Sundar Brown MD   100 mcg at 07/27/20 1438    [DISCONTINUED] heparin lock flush (porcine) injection    PRN Vlad Epstein MD   5 mL at 07/27/20 1457    [DISCONTINUED] lidocaine (PF) 10 mg/ml (1%) injection    PRN Vlad Epstein MD   20 mL at 07/27/20 1459    [DISCONTINUED] midazolam injection   Intravenous PRN Sundar Brown MD   2 mg at 07/27/20 1431    [DISCONTINUED] ondansetron injection 4 mg  4 mg Intravenous Daily PRN Sundar Brown MD        [DISCONTINUED] phenylephrine injection    PRN Sundar Brown MD   200 mcg at 07/27/20 1511    [DISCONTINUED] propofol (DIPRIVAN) 10 mg/mL infusion    Continuous PRN Sundar Brown MD 21 mL/hr at 07/27/20 1520 50 mcg/kg/min at 07/27/20 1520    [DISCONTINUED] propofol (DIPRIVAN) 10 mg/mL infusion    PRN Sundar Brown MD   70 mg at 07/27/20 1445    [DISCONTINUED] sodium chloride 0.9% flush 10 mL  10 mL Intravenous PRN Sundar Brown MD           ALLERGIES: Review of patient's allergies indicates:  No Known Allergies     ROS:       Review of Systems   Constitutional: Negative for diaphoresis, fatigue, fever and unexpected weight change.   HENT:   Negative for lump/mass and sore throat.    Eyes: Negative for icterus.   Respiratory: Negative  "for cough and shortness of breath.    Cardiovascular: Negative for chest pain and palpitations.   Gastrointestinal: Negative for abdominal distention, constipation, diarrhea, nausea and vomiting.   Genitourinary: Negative for dysuria and frequency.    Musculoskeletal: Negative for arthralgias, gait problem and myalgias.   Skin: Negative for rash.   Neurological: Negative for dizziness, gait problem and headaches.   Hematological: Negative for adenopathy. Does not bruise/bleed easily.   Psychiatric/Behavioral: The patient is not nervous/anxious.        PHYSICAL EXAM:  Vitals:    07/27/20 1112   BP: 132/80   Pulse: 64   Resp: 16   Temp: 98.4 °F (36.9 °C)   SpO2: 98%   Weight: 70.6 kg (155 lb 9.6 oz)   Height: 5' 11" (1.803 m)   PainSc: 0-No pain       KARNOFSKY PERFORMANCE STATUS 80%  ECOG 1    Physical Exam  Constitutional:       General: He is not in acute distress.     Appearance: He is well-developed.   HENT:      Head: Normocephalic and atraumatic.      Mouth/Throat:      Mouth: No oral lesions.   Eyes:      Conjunctiva/sclera: Conjunctivae normal.   Neck:      Thyroid: No thyromegaly.   Cardiovascular:      Rate and Rhythm: Normal rate and regular rhythm.      Heart sounds: Normal heart sounds. No murmur.   Pulmonary:      Breath sounds: Normal breath sounds. No wheezing or rales.   Abdominal:      General: There is no distension.      Palpations: Abdomen is soft. There is no hepatomegaly, splenomegaly or mass.      Tenderness: There is no abdominal tenderness.   Lymphadenopathy:      Cervical: No cervical adenopathy.      Right cervical: No deep cervical adenopathy.     Left cervical: No deep cervical adenopathy.      Lower Body: Right inguinal adenopathy present.   Skin:     Findings: No rash.   Neurological:      Mental Status: He is alert and oriented to person, place, and time.      Cranial Nerves: No cranial nerve deficit.      Coordination: Coordination normal.      Deep Tendon Reflexes: Reflexes are " normal and symmetric.         LAB:   Results for orders placed or performed in visit on 07/27/20   CBC auto differential   Result Value Ref Range    WBC 7.25 3.90 - 12.70 K/uL    RBC 4.16 (L) 4.60 - 6.20 M/uL    Hemoglobin 12.6 (L) 14.0 - 18.0 g/dL    Hematocrit 40.5 40.0 - 54.0 %    Mean Corpuscular Volume 97 82 - 98 fL    Mean Corpuscular Hemoglobin 30.3 27.0 - 31.0 pg    Mean Corpuscular Hemoglobin Conc 31.1 (L) 32.0 - 36.0 g/dL    RDW 14.8 (H) 11.5 - 14.5 %    Platelets 176 150 - 350 K/uL    MPV 11.3 9.2 - 12.9 fL    Immature Granulocytes 1.0 (H) 0.0 - 0.5 %    Gran # (ANC) 5.4 1.8 - 7.7 K/uL    Immature Grans (Abs) 0.07 (H) 0.00 - 0.04 K/uL    Lymph # 1.1 1.0 - 4.8 K/uL    Mono # 0.6 0.3 - 1.0 K/uL    Eos # 0.1 0.0 - 0.5 K/uL    Baso # 0.06 0.00 - 0.20 K/uL    nRBC 0 0 /100 WBC    Gran% 74.0 (H) 38.0 - 73.0 %    Lymph% 14.6 (L) 18.0 - 48.0 %    Mono% 7.7 4.0 - 15.0 %    Eosinophil% 1.9 0.0 - 8.0 %    Basophil% 0.8 0.0 - 1.9 %    Differential Method Automated    Comprehensive metabolic panel   Result Value Ref Range    Sodium 143 136 - 145 mmol/L    Potassium 4.0 3.5 - 5.1 mmol/L    Chloride 111 (H) 95 - 110 mmol/L    CO2 26 23 - 29 mmol/L    Glucose 86 70 - 110 mg/dL    BUN, Bld 26 (H) 8 - 23 mg/dL    Creatinine 1.8 (H) 0.5 - 1.4 mg/dL    Calcium 9.9 8.7 - 10.5 mg/dL    Total Protein 7.1 6.0 - 8.4 g/dL    Albumin 3.8 3.5 - 5.2 g/dL    Total Bilirubin 0.6 0.1 - 1.0 mg/dL    Alkaline Phosphatase 56 55 - 135 U/L    AST 26 10 - 40 U/L    ALT 21 10 - 44 U/L    Anion Gap 6 (L) 8 - 16 mmol/L    eGFR if African American 43.4 (A) >60 mL/min/1.73 m^2    eGFR if non  37.6 (A) >60 mL/min/1.73 m^2   Lactate Dehydrogenase   Result Value Ref Range     110 - 260 U/L   PHOSPHORUS   Result Value Ref Range    Phosphorus 3.3 2.7 - 4.5 mg/dL   Uric acid   Result Value Ref Range    Uric Acid 8.6 (H) 3.4 - 7.0 mg/dL     *Note: Due to a large number of results and/or encounters for the requested time period, some  results have not been displayed. A complete set of results can be found in Results Review.       PROBLEMS ASSESSED THIS VISIT:    1. Post-transplant lymphoproliferative disorder (PTLD)        PLAN:       PTLD    Discussed that for monomorphic PTLD, chemoimmunotherapy with R-CHOP is preferred and consistent with NCCN guidelines. He has completed pre-therapy procedures and will have port this afternoon.     We reviewed risks and benefits of therapy and obtained informed consent today.     He will receive growth factor support with pegfilgrastim (On-Body Injector due to remote distance from cancer center) given his age and post-transplant status.    He states that he has been instructed to discontinue mycophenolate mofetil, which I agree with. I have also advocated he discuss his steroids with the transplant team, as he will receive prednisone 100 mg PO daily x 5 days each cycle of therapy.     Hyperuricemia    Will prescribe allopurinol as prophylaxis for tumor lysis syndrome.    CKD-III  S/p kidney transplant    Will monitor carefully and renally dose all medications.     Follow-up  Wednesday for R-CHOP    Luis Fernando Lopez MD  Hematology and Stem Cell Transplant

## 2020-07-28 NOTE — ANESTHESIA POSTPROCEDURE EVALUATION
Anesthesia Post Evaluation    Patient: Alin Burkett    Procedure(s) Performed: Procedure(s) (LRB):  INSERTION, VENOUS ACCESS PORT (Left)    Final Anesthesia Type: MAC    Patient location during evaluation: PACU  Patient participation: Yes- Able to Participate  Level of consciousness: awake and alert and oriented  Post-procedure vital signs: reviewed and stable  Pain management: adequate  Airway patency: patent    PONV status at discharge: No PONV  Anesthetic complications: no      Cardiovascular status: blood pressure returned to baseline  Respiratory status: unassisted, spontaneous ventilation and room air  Hydration status: euvolemic  Follow-up not needed.          Vitals Value Taken Time   /84 07/27/20 1800   Temp 37 °C (98.6 °F) 07/27/20 1800   Pulse 61 07/27/20 1800   Resp 18 07/27/20 1800   SpO2 98 % 07/27/20 1800         No case tracking events are documented in the log.      Pain/Raheem Score: Pain Rating Prior to Med Admin: 6 (7/27/2020  4:14 PM)  Pain Rating Post Med Admin: 3 (7/27/2020  6:00 PM)  Raheem Score: 10 (7/27/2020  4:30 PM)

## 2020-07-29 NOTE — PROGRESS NOTES
Pharmacist Patient Education Note    Patient counseled and given information regarding the chemotherapy regimen: R-CHOP.      Chemotherapy regimen was explained in detail:  - Day 1 patient will receive rituximab, dexamethasone, vincristine, doxorubicin, and cyclophosphamide (all IV)  - Day 2 neulasta shot in clinic (if deemed necessary)  - Day 2-5 Prednisone 100mg by mouth daily at home       The following side effects were discussed:  - Prevention and treatment of nausea/vomiting  - The need for a PICC/central line for the chemotherapy infusion to reduce risk of extravasation  - Myelosuppression and infection risk  - Hair loss  - Short term side effects of steroids: blurred vision, mood swings, insomnia, increased appetite, increases in blood sugar and white count, hypertension  - Constipation with vincristine (and recommendations to start a bowel regimen)  - Peripheral neuropathy associated with vincristine  - Hemorrhagic cystitis with higher doses of cyclophosphamide  - Cardiotoxicity with doxorubucin (and monitoring)  - Red urine with doxorubicin  - Changes in nails/nail beds  - Mucositis  - Infusion reactions with rituximab (first infusion will be long in order to minimize chance of reaction; if no severe reaction occurs then subsequent infusions can be changed to a 10-minute subQ injection)  - Bone pain with Neulasta (if given)  - Monitoring and potential changes of hepatic and renal function while receiving chemotherapy         All questions were answered for patient.       Fabiana Cai, PharmD, BCPS, BCOP  Clinical Pharmacy Specialist   BMT/Hematology Oncology  SpectraLink: [id number]

## 2020-07-29 NOTE — PLAN OF CARE
Pt admitted for C1 RCHOP. Labs reviewed and side effects and self care tips discussed. Fabiana (BMT Pharmacist) came to chair side discuss chemo agents, give patient handouts on all drugs, and answer questions. Teaching continued throughout treatment. Neulasta OBI placed and Pt educated on actions and care. Pt tolerated treatment well, plan of care was reviewed and Pt given AVS. Pt discharged @ 16:05

## 2020-08-06 NOTE — HPI
"Alin Burkett  is a 70-year-old male with a history of kidney transplant 2016, HTN, lymphoma, abdominal aorta dissection, AAA repair, presents the ED with chest pain, right flank pain, and low back pain onset at 01:00.  States that he was sleeping when he was awoken by his pain.  States that his pain has been intermittent and comes in "surges".  States his chest pain resolved on the way to the ER.  Currently he complains of 8/10 pain when it comes in waves.  Denies any abdominal pain or radiating pain in bilateral lower extremities.  He has had urinary frequency.  Denies any injury or trauma.  No dysuria, hematuria, nausea, vomiting, change in bowel movements, fever, chills, diaphoresis, cough, or shortness of breath.  States that when he had a dissection, he had what felt to him was and upset stomach.  "

## 2020-08-06 NOTE — ED TRIAGE NOTES
Chest pain radiating to right hip and low back since 0100 today. Hx of lymphoma and kidney transplant

## 2020-08-06 NOTE — DISCHARGE INSTRUCTIONS
You have a kidney infection.  You were given your 1st dose of ciprofloxacin in the emergency department.  Continue to take 2 times a day which is every 12 hr for the next 10 days.   Call and follow up closely with your kidney transplant doctor and oncologist for further evaluation.  Avoid any fever reducing medications such as aspirin, ibuprofen, naproxen, or Tylenol.    Use heat to the area of pain for 10 min 4 times a day.  You can take narcotic pain medication only as needed for severe pain only.  Return promptly to the emergency department for worsening symptoms such as fever, chills, worsening pain, blood in urine, or any concerning signs or symptoms.    Future Appointments   Date Time Provider Department Center   8/7/2020  9:30 AM LAB, [facility] CLINLAB [facility]   8/14/2020  9:30 AM LAB, [facility] CLINLAB [facility]   8/19/2020 10:00 AM COVID COMMUNITY C, [facility] COMMUNITY TESTING [facility] COMTEST [facility]   8/20/2020  9:30 AM LAB, [facility] CLINLAB [facility]   8/21/2020 10:40 AM Luis Fernando Lopez MD [facility] BMT [facility]   8/21/2020 11:00 AM ROOM 3, [facility] BMT INF [facility] BMT INF [facility]     Our goal in the emergency department is to always give you outstanding care and exceptional service. You may receive a survey by mail or e-mail in the next week regarding your experience in our ED. We would greatly appreciate your completing and returning the survey. Your feedback provides us with a way to recognize our staff who give very good care and it helps us learn how to improve when your experience was below our aspiration of excellence.

## 2020-08-10 NOTE — PROGRESS NOTES
HEMATOLOGIC MALIGNANCIES PROGRESS NOTE    IDENTIFYING STATEMENT   Alin Burkett (Alin Burkett) is a 70 y.o. male with a  of 1950 from [place] with the diagnosis of post-translpant lymphoproliferative disorder.      ONCOLOGY HISTORY:    1. Monomorphic post-transplant lymphoproliferative disorder   A. 2020: Noticed left inguinal lymphadenopathy after a dog bite (failed to resolve with antibiotics)              B. 2020: Saw Dr. Collins in infectious diseases for inguinal lymphadenopathy - referred for biopsy              C. 2020: Core biopsy of L inguinal lymph node shows B-cell lymphoma of germinal center origin; EBV-positive by LONNIE; morphology nondiagnostic of PTLD vs follicular lymphoma              D. 2020: PET/CT shows left internal iliac chain node measuring 3.3 x 3 cm with SUV max 37; L inguinal node measures 3.2 x 2.4 cm with SUV max 36   E. 2020: Excisional biopsy of left inguinal node shows monomorphic post-transplant lymphoproliferative disorder (DLBCL, GCB 60%, follicular lymphoma, grade 3B 40%); FISH for MYC rearrangement is negative   F. 2020: Bone marrow biopsy shows no evidence of B-cell lymphoma    2. History of alcohol abuse, quit   3. Hypertension, complicated by left ventricular hypertrophy  4. Supraventricular tachycardia  5. Abdominal aortic atherosclerosis with aneurysm  6. S/p kidney transplant 2016 c/b AVR s/p thymo 2017  7. Benign prostatic hyperplasia  8. Secondary hyperparathryoidism  9. Obstructive sleep apnea    INTERVAL HISTORY:      Mr. Burkett returns for an ED follow up after being seen on  for chest pain, flank pain, and urinary frequency. The chest pain was coming in waves and resolved when driving to ED. He has a hx of AAA which was repaired. Cardiac work up was negative. CT c/a/p without acute path. Pt was found to have abnormal UA and received cipro for pyelonephritis. Upon review of his urine cx, he grew pseudomonas aeruginosa  which was pansensitive. He will complete a total of 10 day course on 8/16/20. He notes back pain resolved after 2nd day of antibx. Urinary frequency back to normal as well. Of note, he recently had C1 RCHOP for his monomorphic PTLD on 7/29/20. He denies fevers, chills, SOB, n/v/d/c, and any further chest pain.     Past Medical History, Past Social History and Past Family History have been reviewed and are unchanged except as noted in the interval history.    MEDICATIONS:     Current Outpatient Medications on File Prior to Visit   Medication Sig Dispense Refill    allopurinoL (ZYLOPRIM) 300 MG tablet Take 1 tablet (300 mg total) by mouth once daily. 30 tablet 2    aspirin (ECOTRIN) 81 MG EC tablet Take 1 tablet (81 mg total) by mouth once daily.  0    calcitRIOL (ROCALTROL) 0.5 MCG Cap Take 1 capsule (0.5 mcg total) by mouth once daily. 30 capsule 11    ciprofloxacin HCl (CIPRO) 500 MG tablet Take 1 tablet (500 mg total) by mouth 2 (two) times daily. for 10 days 20 tablet 0    COMBIGAN 0.2-0.5 % Drop INSTILL 1 DROP INTO EACH EYE TWICE DAILY      famotidine (PEPCID) 20 MG tablet TAKE 1 TABLET EVERY EVENING 90 tablet 3    HYDROcodone-acetaminophen (NORCO) 5-325 mg per tablet Take 1 tablet by mouth every 6 (six) hours as needed for Pain. 5 tablet 0    ketoconazole (NIZORAL) 200 mg Tab TAKE ONE-HALF (1/2) TABLET ONCE DAILY 45 tablet 3    levothyroxine (SYNTHROID) 100 MCG tablet TAKE 1 TABLET DAILY 90 tablet 3    magnesium oxide (MAG-OX) 400 mg (241.3 mg magnesium) tablet Take 1 tablet (400 mg total) by mouth once daily. 90 tablet 3    metoprolol tartrate (LOPRESSOR) 25 MG tablet TAKE ONE-HALF (1/2) TABLET TWICE A DAY 90 tablet 3    multivitamin (ONE DAILY MULTIVITAMIN) per tablet Take 1 tablet by mouth once daily.      ondansetron (ZOFRAN-ODT) 8 MG TbDL Dissolve 1 tablet (8 mg total) by mouth every 12 (twelve) hours as needed. 21 tablet 1    oxyCODONE (ROXICODONE) 5 MG immediate release tablet Take 1 tablet  (5 mg total) by mouth every 8 (eight) hours as needed for Pain. 6 tablet 0    pep injection Inject 0.25 ml as directed.   May increase by 0.05 ml such as 0.30, 0.35 ml etc. Up to 1.00 ml Until adequate result is achieved.    For compounding pharmacy use:   Add PAPAVERINE 30 mcg  Add PHENTOLAMINE 10 mg  Add ALPROSTADIL 100 mcg 1 vial 2    predniSONE (DELTASONE) 50 MG Tab Take 2 tablets (100 mg total) by mouth once daily. Take on days 2-5 of your chemotherapy cycles. 8 tablet 5    promethazine (PHENERGAN) 12.5 MG Tab Take 1 tablet (12.5 mg total) by mouth every 6 (six) hours as needed. 30 tablet 1    sodium bicarbonate 650 MG tablet Take 1 tablet (650 mg total) by mouth 2 (two) times daily. 540 tablet 3    tacrolimus (PROGRAF) 1 MG Cap Take 3 capsules (3 mg total) by mouth every morning AND 2 capsules (2 mg total) every evening. Z94.0/Kidney Transplant on 11/26/16. 150 capsule 11    travoprost (TRAVATAN Z) 0.004 % ophthalmic solution INSTILL 1 DROP INTO EACH EYE ONCE DAILY AT NIGHT      LORazepam (ATIVAN) 0.5 MG tablet Take 1 tablet (0.5 mg total) by mouth once. 30 minutes before procedure for 1 dose 2 tablet 0     No current facility-administered medications on file prior to visit.        ALLERGIES: Review of patient's allergies indicates:  No Known Allergies     ROS:       Review of Systems   Constitutional: Negative for diaphoresis, fatigue, fever and unexpected weight change.   HENT:   Negative for lump/mass and sore throat.    Eyes: Negative for icterus.   Respiratory: Negative for cough and shortness of breath.    Cardiovascular: Negative for chest pain and palpitations.   Gastrointestinal: Negative for abdominal distention, constipation, diarrhea, nausea and vomiting.   Genitourinary: Negative for dysuria.         Urinary frequency and flank pain resolved   Musculoskeletal: Negative for arthralgias, gait problem and myalgias.   Skin: Negative for rash.   Neurological: Negative for dizziness, gait problem  and headaches.   Psychiatric/Behavioral: The patient is not nervous/anxious.        PHYSICAL EXAM:  Vitals:    08/12/20 0947   BP: 134/84   Pulse: 70   Resp: 16   Temp: 98.3 °F (36.8 °C)   TempSrc: Oral   SpO2: 98%   Weight: 69.4 kg (152 lb 14.4 oz)   PainSc: 0-No pain       KARNOFSKY PERFORMANCE STATUS 80%  ECOG 1    Physical Exam  Constitutional:       General: He is not in acute distress.     Appearance: He is well-developed.   HENT:      Head: Normocephalic and atraumatic.      Mouth/Throat:      Mouth: No oral lesions.   Eyes:      Conjunctiva/sclera: Conjunctivae normal.   Neck:      Thyroid: No thyromegaly.   Cardiovascular:      Rate and Rhythm: Normal rate and regular rhythm.      Heart sounds: Normal heart sounds.   Pulmonary:      Breath sounds: Normal breath sounds.   Abdominal:      General: There is no distension.      Palpations: Abdomen is soft. There is no hepatomegaly, splenomegaly or mass.      Tenderness: There is no abdominal tenderness. There is no right CVA tenderness or left CVA tenderness.   Musculoskeletal:         General: No swelling.      Comments: No further flank or spinal tenderness    Skin:     Findings: No rash.      Comments: Generalized bruising noted to extremities   Neurological:      Mental Status: He is alert and oriented to person, place, and time.      Coordination: Coordination normal.      Deep Tendon Reflexes: Reflexes are normal and symmetric.         LAB:   Results for orders placed or performed in visit on 08/07/20   CBC auto differential   Result Value Ref Range    WBC 7.57 3.90 - 12.70 K/uL    RBC 3.79 (L) 4.60 - 6.20 M/uL    Hemoglobin 11.7 (L) 14.0 - 18.0 g/dL    Hematocrit 37.8 (L) 40.0 - 54.0 %    Mean Corpuscular Volume 100 (H) 82 - 98 fL    Mean Corpuscular Hemoglobin 30.9 27.0 - 31.0 pg    Mean Corpuscular Hemoglobin Conc 31.0 (L) 32.0 - 36.0 g/dL    RDW 13.8 11.5 - 14.5 %    Platelets 103 (L) 150 - 350 K/uL    MPV 12.1 9.2 - 12.9 fL    Immature Granulocytes  CANCELED 0.0 - 0.5 %    Immature Grans (Abs) CANCELED 0.00 - 0.04 K/uL    nRBC 0 0 /100 WBC    Gran% 47.0 38.0 - 73.0 %    Lymph% 11.0 (L) 18.0 - 48.0 %    Mono% 12.0 4.0 - 15.0 %    Eosinophil% 0.0 0.0 - 8.0 %    Basophil% 0.0 0.0 - 1.9 %    Bands 25.0 %    Metamyelocytes 5.0 %    Platelet Estimate Decreased (A)     Differential Method Manual    Comprehensive metabolic panel   Result Value Ref Range    Sodium 144 136 - 145 mmol/L    Potassium 4.0 3.5 - 5.1 mmol/L    Chloride 110 95 - 110 mmol/L    CO2 23 23 - 29 mmol/L    Glucose 93 70 - 110 mg/dL    BUN, Bld 21 8 - 23 mg/dL    Creatinine 1.7 (H) 0.5 - 1.4 mg/dL    Calcium 9.4 8.7 - 10.5 mg/dL    Total Protein 6.7 6.0 - 8.4 g/dL    Albumin 3.2 (L) 3.5 - 5.2 g/dL    Total Bilirubin 0.3 0.1 - 1.0 mg/dL    Alkaline Phosphatase 63 55 - 135 U/L    AST 23 10 - 40 U/L    ALT 42 10 - 44 U/L    Anion Gap 11 8 - 16 mmol/L    eGFR if African American 47 (A) >60 mL/min/1.73 m^2    eGFR if non African American 40 (A) >60 mL/min/1.73 m^2   PHOSPHORUS   Result Value Ref Range    Phosphorus 2.8 2.7 - 4.5 mg/dL   Uric acid   Result Value Ref Range    Uric Acid 6.2 3.4 - 7.0 mg/dL   Lactate Dehydrogenase   Result Value Ref Range     110 - 260 U/L   Type & Screen   Result Value Ref Range    Group & Rh B POS     Indirect [place] POS    Antibody identification   Result Value Ref Range    Antibody Identification POS      *Note: Due to a large number of results and/or encounters for the requested time period, some results have not been displayed. A complete set of results can be found in Results Review.       PROBLEMS ASSESSED THIS VISIT:    1. Pyelonephritis    2. Pseudomonas aeruginosa infection    3. Post-transplant lymphoproliferative disorder (PTLD)    4. Hyperuricemia    5. S/P kidney transplant    6. Stage 3 chronic kidney disease        PLAN:        ED follow up/ Pyelonephritis  Seen in ED 8/6/20 for chest pain, urinary frequency, and flank pain  UA grew pseudomonas  aeruginosa  Started cipro course x10 days, EOT 8/16/20  Symptoms improved, he knows to complete full course of antibx    PTLD  Port placed 7/27/20  S/P C1 RCHOP 7/29/20  Scheduled for C2 8/21/20  Receiving growth factor support with pegfilgrastim (On-Body Injector due to remote distance from cancer center) given his age and post-transplant status.    Hyperuricemia  Continue allopurinol as prophylaxis for tumor lysis syndrome.    CKD-III  S/p kidney transplant  He has been instructed to d/c mycophenolate  He was told to f/u with kidney transplant about steroids as his chemo include prednisone 100 mg PO daily x5 days with each cycles  Will monitor carefully and renally dose medications.    Follow-up as scheduled below for labs next cycle of RCHOP     Future Appointments   Date Time Provider Department Center   8/14/2020  9:30 AM LAB, [facility] CLINLAB [facility]   8/19/2020 10:00 AM COVID COMMUNITY C, [facility] COMMUNITY TESTING [facility] COMTEST [facility]   8/20/2020  9:30 AM LAB, [facility] CLIN[facility]   8/21/2020  9:00 AM Jodie Hernández MD [facility] Brad Arevalo   8/21/2020 10:40 AM uLis Fernando Lopez MD [facility] BMT Dave Calabrese   8/21/2020 11:00 AM ROOM 3, [facility] BMT INF [facility] B[facility] INF [facility]         Kaelyn Valles NP  Hematology and Stem Cell Transplant

## 2020-08-21 PROBLEM — C83.38 DIFFUSE LARGE B-CELL LYMPHOMA OF LYMPH NODES OF MULTIPLE REGIONS: Status: RESOLVED | Noted: 2020-01-01 | Resolved: 2020-01-01

## 2020-08-21 PROBLEM — C83.08: Status: RESOLVED | Noted: 2020-01-01 | Resolved: 2020-01-01

## 2020-08-21 PROBLEM — C83.00 SMALL CELL B-CELL LYMPHOMA: Status: RESOLVED | Noted: 2020-01-01 | Resolved: 2020-01-01

## 2020-08-21 NOTE — TELEPHONE ENCOUNTER
Spoke to wife who saw dr orozco after appt with dr holly orozco wants to postpone cat sx until after tx   Will let dr barrera know     ----- Message from Cecy Edmondson sent at 8/21/2020  3:55 PM CDT -----  Contact: Mrs. Burkett (wife)  Patient wife is calling to speak with you about surgery. Patient contact number is [phone] or [phone].

## 2020-08-21 NOTE — PROGRESS NOTES
HPI     69 YO male here for cat amanda.     Last edited by Doris Christopher on 8/21/2020  8:48 AM. (History)            Assessment /Plan     For exam results, see Encounter Report.    Nuclear sclerotic cataract of left eye  -     IOL Master - OU - Both Eyes    Nuclear sclerotic cataract of right eye      Visually significant nuclear sclerotic cataract   - Interfering with activities of daily living.  Pt desires cataract surgery for [facility] rehabilitation.   - R/B/A discussed and pt agrees to proceed with surgery.   - IOL options discussed according to patient's goals and concomitant ocular pathology; and pt content with monofocal lens.    - Target: NEAR.    pcboo 17.5 OS  *ora - N/C    (pcboo 17.0 OD)    Pt okay with Rx for distance.    OAG  - CPM with gtts, will need to be set up with devi for continued care post cat sx.

## 2020-08-21 NOTE — ASSESSMENT & PLAN NOTE
Patient here for C2 of [facility]. He did well with C1 only having hair loss and some left toe pain.     Plan  - continue with C2 of [facility]\""  - follow up for C3 in 3 weeks

## 2020-08-21 NOTE — PLAN OF CARE
"Pt ambulatory to clinic with S/O for Cycle 2 of RCHOP. Pt reports "diarrhea" ongoing prior to starting treatment. Port accessed without difficulty. Good blood return. Infusing without difficulty. Tolerated Rituxan Hycela, 1st dose, well to LLQ of abd. Chemo infused without difficulty. Pt tolerated well. Slept during most of treatment. OBI to L lateral abd with green light flashing. Pt and S/O aware to remove tomorrow at 1800. Woods needle removed after flushing with NS and Heparin. Bandaid applied. Ambulatory from clinic in NAD.   "

## 2020-08-21 NOTE — ASSESSMENT & PLAN NOTE
Patient has history of small cell B cell lymphoma, presenting today for C2 of RCHOP.     Plan  - continue with RCHOP  - continue allopurinol.

## 2020-08-21 NOTE — PROGRESS NOTES
Clinic Note  2020      Subjective:       Patient ID:  Alin Burkett is a 70 y.o. male being seen to establish care.     Chief Complaint: Follow-up    Alin Burkett (Alin Burkett) is a 70 y.o. male with a  of 1950 from [place] with the diagnosis of post-translpant lymphoproliferative disorder.        ONCOLOGY HISTORY:     1. Monomorphic post-transplant lymphoproliferative disorder              A. 2020: Noticed left inguinal lymphadenopathy after a dog bite (failed to resolve with antibiotics)              B. 2020: Saw Dr. Collins in infectious diseases for inguinal lymphadenopathy - referred for biopsy              C. 2020: Core biopsy of L inguinal lymph node shows B-cell lymphoma of germinal center origin; EBV-positive by LONNIE; morphology nondiagnostic of PTLD vs follicular lymphoma              D. 2020: PET/CT shows left internal iliac chain node measuring 3.3 x 3 cm with SUV max 37; L inguinal node measures 3.2 x 2.4 cm with SUV max 36              E. 2020: Excisional biopsy of left inguinal node shows monomorphic post-transplant lymphoproliferative disorder (DLBCL, GCB 60%, follicular lymphoma, grade 3B 40%); FISH for MYC rearrangement is negative              F. 2020: Bone marrow biopsy shows no evidence of B-cell lymphoma   G.. 20: started RCHOP and got pegfilgastrim     2. History of alcohol abuse, quit   3. Hypertension, complicated by left ventricular hypertrophy  4. Supraventricular tachycardia  5. Abdominal aortic atherosclerosis with aneurysm  6. S/p kidney transplant 2016 c/b AVR s/p thymo 2017  7. Benign prostatic hyperplasia  8. Secondary hyperparathryoidism  9. Obstructive sleep apnea    INTERVAL HISTORY: Last saw Dr. Lopez end of July. Since then, he went to the ED on  for chest pain radiating to right hip and back. Cardiac work up negative but U/A showed pseudomonal UTI and patient was treated with full course of ciprofloxacin. He followed  up with [facility] after this ED visit. He is taking allopurinol for TLS. Patient got first dose of RCHOP end of July. Patient has had hair loss. Denies fatigue, SOB, chest pain, no changes to urination, no dizziness or weakness.     He would like to get cataract surgery soon but wants to make sure it is okay with his chemo treatments.    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  Renal allograft biopsy 17 (DIVINE): 21 glomeruli, none globally sclerosed, <5% interstitial fibrosis, no ACR, c4d negative, AVR CCT Type 2 (V1 lesion); plan THYMO     Dissecting aortic aneurysm (any part), abdominal     Diverticulosis     Encounter for blood transfusion     ESRD (end stage renal disease) 2010    H/O urethral stricture 2018    H/O: urethral stricture     History of AAA (abdominal aortic aneurysm) repair     History of urethral stricture 2018    Hypertension     Hypokalemia     Hypothyroidism 1/10/2014    Inguinal hernia bilateral, non-recurrent     Kidney stones     Organ transplant candidate 2013    Plantar warts 1/10/2014    Recurrent UTI 2017    S/P kidney transplant     Secondary hyperparathyroidism, renal     Thyroid disease        Past Surgical History:   Procedure Laterality Date    ABDOMINAL SURGERY      exploratory lapatomy x 2    ABLATION N/A 2019    Procedure: ABLATION, SVT;  Surgeon: Emerson Mcmanus MD;  Location:  [facility] EP LAB;  Service: Cardiology;  Laterality: N/A;  SVT, RFA, CARTO, anes, GP, 323    AORTA - SUPERIOR MESENTERIC ARTERY BYPASS GRAFT      BLADDER NECK RECONSTRUCTION      BLADDER SURGERY      COLONOSCOPY  10/10/2013    Dr. Gutierrez, repeat in 5 years    CYSTOSCOPY      CYSTOSCOPY N/A 12/19/2018    Procedure: CYSTOSCOPY;  Surgeon: Dewey Mann MD;  [facility];  Service: Urology;  Laterality: N/A;  45 min    CYSTOURETHROSCOPY WITH DIRECT VISION INTERNAL URETHROTOMY N/A 12/19/2018    Procedure: CYSTOSCOPY, WITH DIRECT VISION INTERNAL URETHROTOMY;  Surgeon: Dewey Mann MD;  [facility];  Service: Urology;  Laterality: N/A;    DILATION OF URETHRA N/A 12/19/2018    Procedure: DILATION, URETHRA;  Surgeon: Dewey Mann MD;  [facility];  Service: Urology;  Laterality: N/A;    EXCISIONAL BIOPSY N/A 7/13/2020    Procedure: EXCISIONAL BIOPSY- LEFT INGUINAL NODE;  Surgeon: Vlad Epstein MD;  Location: [address];  Service: General;  Laterality: N/A;    FLEXIBLE CYSTOSCOPY N/A 11/6/2019    Procedure: CYSTOSCOPY, FLEXIBLE;  Surgeon: Dewey Mann MD;  [facility];  Service: Urology;  Laterality: N/A;    GASTROJEJUNOSTOMY      HEMORRHOID SURGERY      HERNIA REPAIR      INSERTION OF VENOUS ACCESS PORT Left 7/27/2020    Procedure: INSERTION, VENOUS ACCESS PORT;  Surgeon: Vlad Epstein MD;  Location: [facility] OR [address] [place] [address];  Service: General;  Laterality: Left;    KIDNEY TRANSPLANT      LEFT HEART CATHETERIZATION Left 8/20/2019    Procedure: Left heart cath;  Surgeon: Javan Oscar MD;  Location: [facility] CATH/EP LAB;  Service: Cardiology;  Laterality: Left;    LITHOTRIPSY      LYMPH NODE BIOPSY N/A 6/30/2020    Procedure: BIOPSY, LYMPH NODE;  Surgeon: Ella Diagnostic Provider;  [facility];  Service: General;  Laterality: N/A;  189 lymph node biopsy /ULTRASOUND    PERCUTANEOUS NEPHROLITHOTRIPSY      right  ESWL   10/31/12    right ESWL  6/26/12    URETHROPLASTY USING PATCH GRAFT N/A 11/6/2019    Procedure: URETHROPLASTY, USING PATCH GRAFT BUCCAL MUCOSA GRAFT;  Surgeon: Dewey Mann MD;  Location: [facility] OR [address] [place] [address];  Service: Urology;  Laterality: N/A;  3 HOURS       Family History   Problem Relation Age of Onset    Diabetes Mother     Alzheimer's disease Mother     Alcohol abuse Father     HIV Brother     Stroke Maternal Aunt     Kidney disease Paternal Uncle     Kidney disease Cousin     No Known Problems Sister     No Known Problems Daughter     No Known Problems Sister     No Known Problems Brother     No Known Problems Brother     Cancer Brother         thyroid cancer    Colon cancer Brother     Melanoma Neg Hx     Psoriasis Neg Hx     Lupus Neg Hx     Eczema Neg Hx     Colon polyps Neg Hx     Crohn's disease Neg Hx     Ulcerative colitis Neg Hx     Celiac disease Neg Hx        Social History     Socioeconomic History    Marital status: Single     Spouse name: Not on file    Number of children: Not on file    Years of education: Not on file    Highest education level: Not on file   Occupational History     Employer: Disabled   Social Needs    Financial resource strain: Not on file    Food insecurity     Worry: Not on file     Inability: Not on file    Transportation needs     Medical: Not on file     Non-medical: Not on file   Tobacco Use    Smoking status: Former Smoker     Packs/day: 0.50     Years: 40.00     Pack years: 20.00     Types: Cigarettes     Quit date: 6/16/2010     Years since quitting: 10.1    Smokeless tobacco: Never Used   Substance and Sexual Activity    Alcohol use: Yes     Comment: occasional/social    Drug use: No     Comment: THC in youth    Sexual activity: Yes     Partners: Female     Birth control/protection: None   Lifestyle    Physical activity     Days per week: Not on file     Minutes per session: Not on file    Stress: Not on file   Relationships     Social connections     Talks on phone: Not on file     Gets together: Not on file     Attends Christianity service: Not on file     Active member of club or organization: Not on file     Attends meetings of clubs or organizations: Not on file     Relationship status: Not on file   Other Topics Concern    Not on file   Social History Narrative    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 Negative for fever.   HENT: Negative for congestion and hearing loss.    Respiratory: Negative for cough and shortness of breath.    Cardiovascular: Negative for chest pain and leg swelling.   Gastrointestinal: Positive for diarrhea (chronic, prior to treatment). Negative for constipation.   Genitourinary: Negative for dysuria and urgency.   Musculoskeletal: Negative for back pain and neck pain.   Neurological: Negative for dizziness and weakness.       Medication List with Changes/Refills   Current Medications    ALLOPURINOL (ZYLOPRIM) 300 MG TABLET    Take 1 tablet (300 mg total) by mouth once daily.    ASPIRIN (ECOTRIN) 81 MG EC TABLET    Take 1 tablet (81 mg total) by mouth once daily.    CALCITRIOL (ROCALTROL) 0.5 MCG CAP    Take 1 capsule (0.5 mcg total) by mouth once daily.    COMBIGAN 0.2-0.5 % DROP    INSTILL 1 DROP INTO EACH EYE TWICE DAILY    FAMOTIDINE (PEPCID) 20 MG TABLET    TAKE 1 TABLET EVERY EVENING    HYDROCODONE-ACETAMINOPHEN (NORCO) 5-325 MG PER TABLET    Take 1 tablet by mouth every 6 (six) hours as needed for Pain.    KETOCONAZOLE (NIZORAL) 200 MG TAB    TAKE ONE-HALF (1/2) TABLET ONCE DAILY    LEVOTHYROXINE (SYNTHROID) 100 MCG TABLET    TAKE 1 TABLET DAILY    LORAZEPAM (ATIVAN) 0.5 MG TABLET    Take 1 tablet (0.5 mg total) by mouth once. 30 minutes before procedure for 1 dose    MAGNESIUM OXIDE (MAG-OX) 400 MG (241.3 MG MAGNESIUM) TABLET    Take 1 tablet (400 mg total) by mouth once daily.    METOPROLOL TARTRATE  (LOPRESSOR) 25 MG TABLET    TAKE ONE-HALF (1/2) TABLET TWICE A DAY    MULTIVITAMIN (ONE DAILY MULTIVITAMIN) PER TABLET    Take 1 tablet by mouth once daily.    ONDANSETRON (ZOFRAN-ODT) 8 MG TBDL    Dissolve 1 tablet (8 mg total) by mouth every 12 (twelve) hours as needed.    OXYCODONE (ROXICODONE) 5 MG IMMEDIATE RELEASE TABLET    Take 1 tablet (5 mg total) by mouth every 8 (eight) hours as needed for Pain.    PEP INJECTION    Inject 0.25 ml as directed.   May increase by 0.05 ml such as 0.30, 0.35 ml etc. Up to 1.00 ml Until adequate result is achieved.    For compounding pharmacy use:   Add PAPAVERINE 30 mcg  Add PHENTOLAMINE 10 mg  Add ALPROSTADIL 100 mcg    PROMETHAZINE (PHENERGAN) 12.5 MG TAB    Take 1 tablet (12.5 mg total) by mouth every 6 (six) hours as needed.    SODIUM BICARBONATE 650 MG TABLET    Take 1 tablet (650 mg total) by mouth 2 (two) times daily.    TACROLIMUS (PROGRAF) 1 MG CAP    Take 3 capsules (3 mg total) by mouth every morning AND 2 capsules (2 mg total) every evening. Z94.0/Kidney Transplant on 11/26/16.    TRAVOPROST (TRAVATAN Z) 0.004 % OPHTHALMIC SOLUTION    INSTILL 1 DROP INTO EACH EYE ONCE DAILY AT NIGHT   Changed and/or Refilled Medications    Modified Medication Previous Medication    PREDNISONE (DELTASONE) 50 MG TAB predniSONE (DELTASONE) 50 MG Tab       Take 2 tablets (100 mg total) by mouth once daily. Take on days 2-5 of your chemotherapy cycles.    Take 2 tablets (100 mg total) by mouth once daily. Take on days 2-5 of your chemotherapy cycles.       Patient Active Problem List   Diagnosis    Adrenal adenoma    Essential hypertension    Congenital absence of kidney    Anemia of chronic disease    Secondary hyperparathyroidism, renal    Acquired hypothyroidism    Plantar warts    History of smoking 10-25 pack years, quit 2010, 20 pack-years    History of alcohol abuse, quit 2010    LOUISA (obstructive sleep apnea), CPAP refused    LVH (left ventricular hypertrophy) due to  "hypertensive disease    BPH (benign prostatic hyperplasia)    Immunosuppressive management encounter following kidney transplant    -donor kidney transplant    S/P kidney transplant    Hypophosphatemia    Stage 3 chronic kidney disease    Stricture of anterior urethra in male    Bladder diverticulum    Thrombocytopenia    Abdominal aortic atherosclerosis    Abdominal aortic aneurysm (AAA) without rupture    SVT (supraventricular tachycardia)    Rectal bleeding    Viral URI with cough    Immunocompromised state    Inguinal adenopathy    Lymphadenopathy    Post-transplant lymphoproliferative disorder (PTLD)    Hyperuricemia               Objective:      BP (!) 142/93 (BP Location: Right arm, Patient Position: Sitting, BP Method: Large (Automatic))   Pulse 68   Temp 98.4 °F (36.9 °C) (Oral)   Resp 16   Ht 5' 11" (1.803 m)   Wt 69.8 kg (153 lb 14.4 oz)   SpO2 (!) 92%   BMI 21.46 kg/m²   Estimated body mass index is 21.46 kg/m² as calculated from the following:    Height as of this encounter: 5' 11" (1.803 m).    Weight as of this encounter: 69.8 kg (153 lb 14.4 oz).    KARNOFSKY PERFORMANCE STATUS     80%  ECOG 1       Physical Exam   Constitutional: He is oriented to person, place, and time and well-developed, well-nourished, and in no distress.   Cardiovascular: Normal rate, regular rhythm and normal heart sounds.   Pulmonary/Chest: Effort normal and breath sounds normal. No respiratory distress. He has no rales.   Abdominal: Soft. Bowel sounds are normal. He exhibits mass (R kidney in RLQ after transplant). He exhibits no distension. There is no abdominal tenderness.   Musculoskeletal:         General: Tenderness (left toe joint pain) and edema (L ankle edema) present.      Comments: Port in left chest wall   Neurological: He is alert and oriented to person, place, and time. No cranial nerve deficit.   Skin: Skin is warm and dry. No rash noted. No erythema.   Psychiatric: Memory, " affect and judgment normal.   Nursing note and vitals reviewed.        Assessment and Plan:         Problem List Items Addressed This Visit           Post-transplant lymphoproliferative disorder (PTLD) - Primary    Current Assessment & Plan     Patient here for C2 of [facility]. He did well with C1 only having hair loss and some left toe pain.     Plan  - continue with C2 of RCHOP followed by 4 days of prednisone  - follow up for C3 in 3 weeks  - continue daily allopurinol         Relevant Medications    predniSONE (DELTASONE) 50 MG Tab    Other Relevant Orders    CBC auto differential    Comprehensive metabolic panel    Type & Screen    Lactate Dehydrogenase          Follow Up:       Alin was seen today for follow-up.    Diagnoses and all orders for this visit:    Post-transplant lymphoproliferative disorder (PTLD)  -     predniSONE (DELTASONE) 50 MG Tab; Take 2 tablets (100 mg total) by mouth once daily. Take on days 2-5 of your chemotherapy cycles.  -     CBC auto differential; Future  -     Comprehensive metabolic panel; Future  -     Type & Screen; Future  -     Lactate Dehydrogenase; Future    Diffuse large B-cell lymphoma of lymph nodes of multiple regions    Other orders  -     acetaminophen tablet 650 mg  -     diphenhydrAMINE (BENADRYL) 50 mg in sodium chloride 0.9% 50 mL IVPB  -     famotidine (PF) injection 20 mg  -     rituxan hycela 1400 mg/11.7 mL (120 mg/mL) injection 1,400 mg  -     meperidine injection 25 mg  -     palonosetron (ALOXI) 0.25 mg, dexamethasone (DECADRON) 12 mg in sodium chloride 0.9% 50 mL IVPB  -     vinCRIStine (ONCOVIN) 2 mg in sodium chloride 0.9% 50 mL chemo infusion  -     DOXOrubicin chemo injection 94 mg  -     cyclophosphamide (CYTOXAN) 750 mg/m2 = 1,405 mg in sodium chloride 0.9% 250 mL chemo infusion  -     pegfilgrastim (NEULASTA (ON BODY INJECTOR)) injection 6 mg  -     sodium chloride 0.9% 250 mL flush bag  -     sodium chloride 0.9% flush 10 mL  -     heparin, porcine (PF)  100 unit/mL injection flush 500 Units  -     alteplase injection 2 mg            Other Orders Placed This Visit:  Orders Placed This Encounter   Procedures    CBC auto differential    Comprehensive metabolic panel    Lactate Dehydrogenase    Type & Screen             Interview, Assessment, Findings, and Plan discussed with Dr. Lopez    No follow-ups on file.    Jacinta Bull MD  Internal Medicine, PGY2  [phone]    ATTENDING ADDENDUM:    Mr. Burkett presents for Cycle 2 of R-CHOP for PTLD. He tolerated Cycle 1 well. He had one presentation to the emergency department for chest pain, and MI was ruled out. He was found to have urinary tract infection/pyelonephritis and treated with outpatient oral antibiotic therapy. Otherwise, he feels well. No issues with fever or chills. No lymphadenopathy.     He has mild anemia related to chemotherapy. We will monitor.     He has cataracts and desires surgery. I recommend he wait until he is completed with all chemotherapy to avoid risk of infection, hemorrhage, or poor wound healing while on chemotherapy for PTLD.     Follow-up in 3 weeks for cycle 3.     Luis Fernando Lopez MD  Hematology/Oncology and Stem Cell Transplant

## 2020-08-27 NOTE — TELEPHONE ENCOUNTER
"----- Message from Cuca Saenz sent at 8/27/2020 12:54 PM CDT -----  Regarding: Symptoms  Patient Assist    Name of caller:  Alin   Provider name: John Gould MD   Contact Preference:  [phone]   Current patient or new patient?:  current   Does Patient feel the need to see the MD today? No   What is the nature of the call?    - pt was told that he had a kidney infection, took all   the antibiotics but he feels that it has returned. Please  call and assist.  Experiencing symptoms that were   there the last time.     Additional Notes:   "Thank you for all that you do for our patients'"          "

## 2020-08-27 NOTE — TELEPHONE ENCOUNTER
Spoke with patient and reviewed his urinary symptoms. Reviewed with Dr. Lopez and set up for urinalysis and urine culture tomorrow when he is coming with his daughter to the area.

## 2020-09-02 NOTE — PROGRESS NOTES
HEMATOLOGIC MALIGNANCIES PROGRESS NOTE    IDENTIFYING STATEMENT   Alin Burkett (Alin Burkett) is a 70 y.o. male with a  of 1950 from [place] with the diagnosis of post-translpant lymphoproliferative disorder.      ONCOLOGY HISTORY:    1. Monomorphic post-transplant lymphoproliferative disorder   A. 2020: Noticed left inguinal lymphadenopathy after a dog bite (failed to resolve with antibiotics)              B. 2020: Saw Dr. Collins in infectious diseases for inguinal lymphadenopathy - referred for biopsy              C. 2020: Core biopsy of L inguinal lymph node shows B-cell lymphoma of germinal center origin; EBV-positive by LONNIE; morphology nondiagnostic of PTLD vs follicular lymphoma              D. 2020: PET/CT shows left internal iliac chain node measuring 3.3 x 3 cm with SUV max 37; L inguinal node measures 3.2 x 2.4 cm with SUV max 36   E. 2020: Excisional biopsy of left inguinal node shows monomorphic post-transplant lymphoproliferative disorder (DLBCL, GCB 60%, follicular lymphoma, grade 3B 40%); FISH for MYC rearrangement is negative   F. 2020: Bone marrow biopsy shows no evidence of B-cell lymphoma   G. 2020: Cycle 1 R-CHOP; course complicated by pansensitive E. Coli UTI    2. History of alcohol abuse, quit   3. Hypertension, complicated by left ventricular hypertrophy  4. Supraventricular tachycardia  5. Abdominal aortic atherosclerosis with aneurysm  6. S/p kidney transplant 2016 c/b AVR s/p thymo 2017  7. Benign prostatic hyperplasia  8. Secondary hyperparathryoidism  9. Obstructive sleep apnea    INTERVAL HISTORY:      Mr. Burkett returns to clinic on Cycle 2 day 8 of R-CHOP chemotherapy. He feels generally fatigued. He has return of some left flank pain and urinary discomfort and is worried about recurrence of UTI. He denies any fever or chills. Feels run down. He submitted a urine sample but wanted evaluation.     Past Medical History, Past Social  History and Past Family History have been reviewed and are unchanged except as noted in the interval history.    MEDICATIONS:     Current Outpatient Medications on File Prior to Visit   Medication Sig Dispense Refill    allopurinoL (ZYLOPRIM) 300 MG tablet Take 1 tablet (300 mg total) by mouth once daily. 30 tablet 2    aspirin (ECOTRIN) 81 MG EC tablet Take 1 tablet (81 mg total) by mouth once daily.  0    calcitRIOL (ROCALTROL) 0.5 MCG Cap Take 1 capsule (0.5 mcg total) by mouth once daily. 30 capsule 11    COMBIGAN 0.2-0.5 % Drop INSTILL 1 DROP INTO EACH EYE TWICE DAILY      famotidine (PEPCID) 20 MG tablet TAKE 1 TABLET EVERY EVENING 90 tablet 3    HYDROcodone-acetaminophen (NORCO) 5-325 mg per tablet Take 1 tablet by mouth every 6 (six) hours as needed for Pain. 5 tablet 0    ketoconazole (NIZORAL) 200 mg Tab TAKE ONE-HALF (1/2) TABLET ONCE DAILY 45 tablet 3    levothyroxine (SYNTHROID) 100 MCG tablet TAKE 1 TABLET DAILY 90 tablet 3    magnesium oxide (MAG-OX) 400 mg (241.3 mg magnesium) tablet Take 1 tablet (400 mg total) by mouth once daily. 90 tablet 3    metoprolol tartrate (LOPRESSOR) 25 MG tablet TAKE ONE-HALF (1/2) TABLET TWICE A DAY 90 tablet 3    multivitamin (ONE DAILY MULTIVITAMIN) per tablet Take 1 tablet by mouth once daily.      oxyCODONE (ROXICODONE) 5 MG immediate release tablet Take 1 tablet (5 mg total) by mouth every 8 (eight) hours as needed for Pain. 6 tablet 0    pep injection Inject 0.25 ml as directed.   May increase by 0.05 ml such as 0.30, 0.35 ml etc. Up to 1.00 ml Until adequate result is achieved.    For compounding pharmacy use:   Add PAPAVERINE 30 mcg  Add PHENTOLAMINE 10 mg  Add ALPROSTADIL 100 mcg 1 vial 2    predniSONE (DELTASONE) 50 MG Tab Take 2 tablets (100 mg total) by mouth once daily. Take on days 2-5 of your chemotherapy cycles. 8 tablet 5    promethazine (PHENERGAN) 12.5 MG Tab Take 1 tablet (12.5 mg total) by mouth every 6 (six) hours as needed. 30  "tablet 1    sodium bicarbonate 650 MG tablet Take 1 tablet (650 mg total) by mouth 2 (two) times daily. 540 tablet 3    tacrolimus (PROGRAF) 1 MG Cap Take 3 capsules (3 mg total) by mouth every morning AND 2 capsules (2 mg total) every evening. Z94.0/Kidney Transplant on 11/26/16. 150 capsule 11    travoprost (TRAVATAN Z) 0.004 % ophthalmic solution INSTILL 1 DROP INTO EACH EYE ONCE DAILY AT NIGHT      LORazepam (ATIVAN) 0.5 MG tablet Take 1 tablet (0.5 mg total) by mouth once. 30 minutes before procedure for 1 dose (Patient not taking: Reported on 8/28/2020) 2 tablet 0    ondansetron (ZOFRAN-ODT) 8 MG TbDL Dissolve 1 tablet (8 mg total) by mouth every 12 (twelve) hours as needed. (Patient not taking: Reported on 8/28/2020) 21 tablet 1     No current facility-administered medications on file prior to visit.        ALLERGIES: Review of patient's allergies indicates:  No Known Allergies     ROS:       Review of Systems   Constitutional: Positive for fatigue. Negative for diaphoresis, fever and unexpected weight change.   HENT:   Negative for lump/mass and sore throat.    Eyes: Negative for icterus.   Respiratory: Negative for cough and shortness of breath.    Cardiovascular: Negative for chest pain and palpitations.   Gastrointestinal: Negative for abdominal distention, constipation, diarrhea, nausea and vomiting.   Genitourinary: Negative for dysuria.         Urinary frequency and flank pain has returned   Musculoskeletal: Negative for arthralgias, gait problem and myalgias.   Skin: Negative for rash.   Neurological: Negative for dizziness, gait problem and headaches.   Psychiatric/Behavioral: The patient is not nervous/anxious.        PHYSICAL EXAM:  Vitals:    08/28/20 1125   BP: 106/74   Pulse: 87   Temp: 99.4 °F (37.4 °C)   TempSrc: Oral   Weight: 68.9 kg (151 lb 14.4 oz)   Height: 5' 11" (1.803 m)   PainSc:   9       KARNOFSKY PERFORMANCE STATUS 80%  ECOG 1    Physical Exam  Constitutional:       General: " He is not in acute distress.     Appearance: He is well-developed.   HENT:      Head: Normocephalic and atraumatic.      Mouth/Throat:      Mouth: No oral lesions.   Eyes:      Conjunctiva/sclera: Conjunctivae normal.   Neck:      Thyroid: No thyromegaly.   Cardiovascular:      Rate and Rhythm: Normal rate and regular rhythm.      Heart sounds: Normal heart sounds.   Pulmonary:      Breath sounds: Normal breath sounds.   Abdominal:      General: There is no distension.      Palpations: Abdomen is soft. There is no hepatomegaly, splenomegaly or mass.      Tenderness: There is no abdominal tenderness. There is no right CVA tenderness or left CVA tenderness.   Musculoskeletal:         General: No swelling.      Comments: No further flank or spinal tenderness    Skin:     Findings: No rash.      Comments: Generalized bruising noted to extremities   Neurological:      Mental Status: He is alert and oriented to person, place, and time.      Coordination: Coordination normal.      Deep Tendon Reflexes: Reflexes are normal and symmetric.         LAB:   Results for orders placed or performed in visit on 08/28/20   CBC auto differential   Result Value Ref Range    WBC 0.55 (LL) 3.90 - 12.70 K/uL    RBC 3.80 (L) 4.60 - 6.20 M/uL    Hemoglobin 11.7 (L) 14.0 - 18.0 g/dL    Hematocrit 37.5 (L) 40.0 - 54.0 %    Mean Corpuscular Volume 99 (H) 82 - 98 fL    Mean Corpuscular Hemoglobin 30.8 27.0 - 31.0 pg    Mean Corpuscular Hemoglobin Conc 31.2 (L) 32.0 - 36.0 g/dL    RDW 14.6 (H) 11.5 - 14.5 %    Platelets 96 (L) 150 - 350 K/uL    MPV 13.0 (H) 9.2 - 12.9 fL    Immature Granulocytes CANCELED 0.0 - 0.5 %    Immature Grans (Abs) CANCELED 0.00 - 0.04 K/uL    nRBC 0 0 /100 WBC    Gran% 8.0 (L) 38.0 - 73.0 %    Lymph% 42.0 18.0 - 48.0 %    Mono% 8.0 4.0 - 15.0 %    Eosinophil% 36.0 (H) 0.0 - 8.0 %    Basophil% 4.0 (H) 0.0 - 1.9 %    Bands 2.0 %    Platelet Estimate Decreased (A)     Aniso Slight     Poik Slight     Differential Method  Manual    Comprehensive metabolic panel   Result Value Ref Range    Sodium 141 136 - 145 mmol/L    Potassium 3.9 3.5 - 5.1 mmol/L    Chloride 108 95 - 110 mmol/L    CO2 25 23 - 29 mmol/L    Glucose 100 70 - 110 mg/dL    BUN, Bld 27 (H) 8 - 23 mg/dL    Creatinine 1.5 (H) 0.5 - 1.4 mg/dL    Calcium 9.2 8.7 - 10.5 mg/dL    Total Protein 6.7 6.0 - 8.4 g/dL    Albumin 3.7 3.5 - 5.2 g/dL    Total Bilirubin 0.7 0.1 - 1.0 mg/dL    Alkaline Phosphatase 86 55 - 135 U/L    AST 18 10 - 40 U/L    ALT 25 10 - 44 U/L    Anion Gap 8 8 - 16 mmol/L    eGFR if African American 54.1 (A) >60 mL/min/1.73 m^2    eGFR if non  46.8 (A) >60 mL/min/1.73 m^2   Lactate Dehydrogenase   Result Value Ref Range     110 - 260 U/L   Type & Screen   Result Value Ref Range    Group & Rh B POS     Indirect [place] NEG      *Note: Due to a large number of results and/or encounters for the requested time period, some results have not been displayed. A complete set of results can be found in Results Review.       PROBLEMS ASSESSED THIS VISIT:    1. Acute cystitis without hematuria    2. Pseudomonas aeruginosa infection        PLAN:        PTLD  Today is Cycle 2, day 8 of R-CHOP chemotherapy. He is neutropenic today. I suspect this is the cause of his fatigue.     Pancytopenia  Due to chemotherapy. Monitor to resolution.     Acute cystitis  Urinalysis shows increase in WBCs, potentially consistent with UTI. Will treat empirically for pansensitive Pseudomonas, which is his prior organism. Prescribed ciprofloxacin.     Hyperuricemia  Continue allopurinol as prophylaxis for tumor lysis syndrome.    CKD-III  S/p kidney transplant  He has been instructed to d/c mycophenolate  He was told to f/u with kidney transplant about steroids as his chemo include prednisone 100 mg PO daily x5 days with each cycles  Will monitor carefully and renally dose medications.    Follow-up as scheduled below for labs next cycle of [facility]     Future Appointments    Date Time Provider Department Center   9/10/2020 10:00 AM [facility]   9/11/2020 11:20 AM Luis Fernando Lopez MD [facility] HC BMT [facility]   9/11/2020  1:00 PM CHAIR 8, [facility] BMT INF [facility] BMT INF [facility] BMT   12/17/2020  2:00 PM Antwon Simons MD [facility] at [facility]         Luis Fernando Lopez MD  Hematology and Stem Cell Transplant

## 2020-09-11 NOTE — Clinical Note
Please schedule labs (CBC, CMP, LDH), MD/NP visit, and chemo appt on 10/2 for next cycle of chemotherapy.

## 2020-09-11 NOTE — PLAN OF CARE
Pt ambulatory to clinic with S/O for RCHOP. Denies any sig complaints, reports a UTI a few days after treatment. MD to RX antibiotics for him to take starting today post treatments. Port accessed without difficulty , good blood return. Will draw blood from port for TSH level today. Pt made comfortable.

## 2020-09-13 NOTE — PROGRESS NOTES
HEMATOLOGIC MALIGNANCIES PROGRESS NOTE    IDENTIFYING STATEMENT   Alin Burkett (Alin Burkett) is a 70 y.o. male with a  of 1950 from [place] with the diagnosis of post-translpant lymphoproliferative disorder.      ONCOLOGY HISTORY:    1. Monomorphic post-transplant lymphoproliferative disorder   A. 2020: Noticed left inguinal lymphadenopathy after a dog bite (failed to resolve with antibiotics)              B. 2020: Saw Dr. Collins in infectious diseases for inguinal lymphadenopathy - referred for biopsy              C. 2020: Core biopsy of L inguinal lymph node shows B-cell lymphoma of germinal center origin; EBV-positive by LONNIE; morphology nondiagnostic of PTLD vs follicular lymphoma              D. 2020: PET/CT shows left internal iliac chain node measuring 3.3 x 3 cm with SUV max 37; L inguinal node measures 3.2 x 2.4 cm with SUV max 36   E. 2020: Excisional biopsy of left inguinal node shows monomorphic post-transplant lymphoproliferative disorder (DLBCL, GCB 60%, follicular lymphoma, grade 3B 40%); FISH for MYC rearrangement is negative   F. 2020: Bone marrow biopsy shows no evidence of B-cell lymphoma   G. 2020: Cycle 1 R-CHOP; course complicated by pansensitive E. Coli UTI              H. 2020: Cycle 2 R-CHOP: tolerated well except possible UTI for which he was treated empirically with antibiotics      2. History of alcohol abuse, quit   3. Hypertension, complicated by left ventricular hypertrophy  4. Supraventricular tachycardia  5. Abdominal aortic atherosclerosis with aneurysm  6. S/p kidney transplant 2016 c/b AVR s/p thymo 2017  7. Benign prostatic hyperplasia  8. Secondary hyperparathryoidism  9. Obstructive sleep apnea  10. Chronic rhinorrhea     INTERVAL HISTORY:      Mr. Burkett returns to clinic for Cycle 3 day 1 of R-CHOP chemotherapy. He feels generally well. Had mid cycle fatigue after cycle 2 and was treated for possible UTI. He is very  concerned about getting another UTI with this cycle. He also reports chronic clear rhinorrhea without blood which he has had for 2 years now. His brother passed away from nasopharyngeal carcinoma. In office his HR was in the 50s. He was not symptomatic. He has hypothyroidism for which he is on Levothyroxine 100 mcg daily. His TSH in 1/2020 was 15 however he doesn't remember if his dose was increased after that.     Past Medical History, Past Social History and Past Family History have been reviewed and are unchanged except as noted in the interval history.    MEDICATIONS:     Current Outpatient Medications on File Prior to Visit   Medication Sig Dispense Refill    allopurinoL (ZYLOPRIM) 300 MG tablet Take 1 tablet (300 mg total) by mouth once daily. 30 tablet 2    calcitRIOL (ROCALTROL) 0.5 MCG Cap Take 1 capsule (0.5 mcg total) by mouth once daily. 30 capsule 11    COMBIGAN 0.2-0.5 % Drop INSTILL 1 DROP INTO EACH EYE TWICE DAILY      famotidine (PEPCID) 20 MG tablet TAKE 1 TABLET EVERY EVENING 90 tablet 3    HYDROcodone-acetaminophen (NORCO) 5-325 mg per tablet Take 1 tablet by mouth every 6 (six) hours as needed for Pain. 5 tablet 0    ketoconazole (NIZORAL) 200 mg Tab TAKE ONE-HALF (1/2) TABLET ONCE DAILY 45 tablet 3    levothyroxine (SYNTHROID) 100 MCG tablet TAKE 1 TABLET DAILY 90 tablet 3    magnesium oxide (MAG-OX) 400 mg (241.3 mg magnesium) tablet Take 1 tablet (400 mg total) by mouth once daily. 90 tablet 3    metoprolol tartrate (LOPRESSOR) 25 MG tablet TAKE ONE-HALF (1/2) TABLET TWICE A DAY 90 tablet 3    multivitamin (ONE DAILY MULTIVITAMIN) per tablet Take 1 tablet by mouth once daily.      oxyCODONE (ROXICODONE) 5 MG immediate release tablet Take 1 tablet (5 mg total) by mouth every 8 (eight) hours as needed for Pain. 6 tablet 0    pep injection Inject 0.25 ml as directed.   May increase by 0.05 ml such as 0.30, 0.35 ml etc. Up to 1.00 ml Until adequate result is achieved.    For  compounding pharmacy use:   Add PAPAVERINE 30 mcg  Add PHENTOLAMINE 10 mg  Add ALPROSTADIL 100 mcg 1 vial 2    predniSONE (DELTASONE) 50 MG Tab Take 2 tablets (100 mg total) by mouth once daily. Take on days 2-5 of your chemotherapy cycles. 8 tablet 5    promethazine (PHENERGAN) 12.5 MG Tab Take 1 tablet (12.5 mg total) by mouth every 6 (six) hours as needed. 30 tablet 1    sodium bicarbonate 650 MG tablet Take 1 tablet (650 mg total) by mouth 2 (two) times daily. 540 tablet 3    tacrolimus (PROGRAF) 1 MG Cap Take 3 capsules (3 mg total) by mouth every morning AND 2 capsules (2 mg total) every evening. Z94.0/Kidney Transplant on 11/26/16. 150 capsule 11    travoprost (TRAVATAN Z) 0.004 % ophthalmic solution INSTILL 1 DROP INTO EACH EYE ONCE DAILY AT NIGHT      aspirin (ECOTRIN) 81 MG EC tablet Take 1 tablet (81 mg total) by mouth once daily.  0    LORazepam (ATIVAN) 0.5 MG tablet Take 1 tablet (0.5 mg total) by mouth once. 30 minutes before procedure for 1 dose (Patient not taking: Reported on 8/28/2020) 2 tablet 0    ondansetron (ZOFRAN-ODT) 8 MG TbDL Dissolve 1 tablet (8 mg total) by mouth every 12 (twelve) hours as needed. (Patient not taking: Reported on 8/28/2020) 21 tablet 1     No current facility-administered medications on file prior to visit.        ALLERGIES: Review of patient's allergies indicates:  No Known Allergies     ROS:       Review of Systems   Constitutional: Negative for diaphoresis, fatigue, fever and unexpected weight change.   HENT:   Negative for lump/mass and sore throat.    Eyes: Negative for icterus.   Respiratory: Negative for cough and shortness of breath.    Cardiovascular: Negative for chest pain and palpitations.   Gastrointestinal: Negative for abdominal distention, constipation, diarrhea, nausea and vomiting.   Genitourinary: Negative for dysuria.    Musculoskeletal: Negative for arthralgias, gait problem and myalgias.   Skin: Negative for rash.   Neurological: Negative  "for dizziness, gait problem and headaches.   Psychiatric/Behavioral: The patient is not nervous/anxious.        PHYSICAL EXAM:  Vitals:    09/11/20 1100   BP: 102/69   Pulse: (!) 50   Resp: 16   Temp: 98.8 °F (37.1 °C)   TempSrc: Oral   SpO2: 99%   Weight: 68.6 kg (151 lb 2 oz)   Height: 5' 11" (1.803 m)   PainSc: 0-No pain       KARNOFSKY PERFORMANCE STATUS 80%  ECOG 1    Physical Exam  Constitutional:       General: He is not in acute distress.     Appearance: He is well-developed.   HENT:      Head: Normocephalic and atraumatic.      Mouth/Throat:      Mouth: No oral lesions.   Eyes:      Conjunctiva/sclera: Conjunctivae normal.   Neck:      Thyroid: No thyromegaly.   Cardiovascular:      Rate and Rhythm: Normal rate and regular rhythm.      Heart sounds: Normal heart sounds.   Pulmonary:      Breath sounds: Normal breath sounds.   Abdominal:      General: There is no distension.      Palpations: Abdomen is soft. There is no hepatomegaly, splenomegaly or mass.      Tenderness: There is no abdominal tenderness. There is no right CVA tenderness or left CVA tenderness.   Musculoskeletal:         General: No swelling.      Comments: No further flank or spinal tenderness    Skin:     Findings: No rash.   Neurological:      Mental Status: He is alert and oriented to person, place, and time.      Coordination: Coordination normal.      Deep Tendon Reflexes: Reflexes are normal and symmetric.         LAB:   Results for orders placed or performed in visit on 09/10/20   CBC auto differential   Result Value Ref Range    WBC 5.17 3.90 - 12.70 K/uL    RBC 3.62 (L) 4.60 - 6.20 M/uL    Hemoglobin 11.5 (L) 14.0 - 18.0 g/dL    Hematocrit 36.4 (L) 40.0 - 54.0 %    Mean Corpuscular Volume 101 (H) 82 - 98 fL    Mean Corpuscular Hemoglobin 31.8 (H) 27.0 - 31.0 pg    Mean Corpuscular Hemoglobin Conc 31.6 (L) 32.0 - 36.0 g/dL    RDW 15.7 (H) 11.5 - 14.5 %    Platelets 260 150 - 350 K/uL    MPV 10.3 9.2 - 12.9 fL    Immature " Granulocytes 0.6 (H) 0.0 - 0.5 %    Gran # (ANC) 3.9 1.8 - 7.7 K/uL    Immature Grans (Abs) 0.03 0.00 - 0.04 K/uL    Lymph # 0.5 (L) 1.0 - 4.8 K/uL    Mono # 0.6 0.3 - 1.0 K/uL    Eos # 0.1 0.0 - 0.5 K/uL    Baso # 0.05 0.00 - 0.20 K/uL    nRBC 0 0 /100 WBC    Gran% 75.5 (H) 38.0 - 73.0 %    Lymph% 9.7 (L) 18.0 - 48.0 %    Mono% 12.0 4.0 - 15.0 %    Eosinophil% 1.2 0.0 - 8.0 %    Basophil% 1.0 0.0 - 1.9 %    Differential Method Automated    Comprehensive metabolic panel   Result Value Ref Range    Sodium 144 136 - 145 mmol/L    Potassium 3.5 3.5 - 5.1 mmol/L    Chloride 114 (H) 95 - 110 mmol/L    CO2 23 23 - 29 mmol/L    Glucose 52 (L) 70 - 110 mg/dL    BUN, Bld 22 8 - 23 mg/dL    Creatinine 1.7 (H) 0.5 - 1.4 mg/dL    Calcium 9.4 8.7 - 10.5 mg/dL    Total Protein 6.4 6.0 - 8.4 g/dL    Albumin 3.4 (L) 3.5 - 5.2 g/dL    Total Bilirubin 0.3 0.1 - 1.0 mg/dL    Alkaline Phosphatase 58 55 - 135 U/L    AST 31 10 - 40 U/L    ALT 26 10 - 44 U/L    Anion Gap 7 (L) 8 - 16 mmol/L    eGFR if African American 47 (A) >60 mL/min/1.73 m^2    eGFR if non African American 40 (A) >60 mL/min/1.73 m^2   Lactate Dehydrogenase   Result Value Ref Range     110 - 260 U/L   Type & Screen   Result Value Ref Range    Group & Rh B POS     Indirect [place] NEG      *Note: Due to a large number of results and/or encounters for the requested time period, some results have not been displayed. A complete set of results can be found in Results Review.       PROBLEMS ASSESSED THIS VISIT:    1. Post-transplant lymphoproliferative disorder (PTLD)    2. Acquired hypothyroidism        PLAN:        PTLD  He is here for Cycle 3, day 1 of R-CHOP chemotherapy. He feels well, labs are unremarkable, he may proceed with chemotherapy today. He will get a mid treatment PET CT after 4 cycles.     Pancytopenia  Resolved today, Neulasta OBI today.    Acute cystitis  Resolved, will prescribe Levofloxacin 500 mg daily for PPx through the end of his RCHOP course.      Hyperuricemia  Continue allopurinol as prophylaxis for tumor lysis syndrome.    CKD-III  S/p kidney transplant  He has been instructed to d/c mycophenolate  He was told to f/u with kidney transplant about steroids as his chemo include prednisone 100 mg PO daily x5 days with each cycles  Will monitor carefully and renally dose medications.    Chronic rhinorrhea  Unlikely to be a sign of nasopharyngeal malignancy, we will refer to ENT once he is done with his chemotherapy course for PTLD.     Follow-up as scheduled below for labs next cycle of [facility]     Future Appointments   Date Time Provider Department Center   10/2/2020  8:30 AM [facility] LAB BMT [facility] LABBMT [facility]abrese   10/2/2020  9:30 AM Nelly Toscano NP [facility] HC BMT Dave Calabrese   10/2/2020 10:00 AM CHAIR 8, [facility] BMT INF [facility] BMT INF [facility] BMT   12/17/2020  2:00 PM Antwon Simons [place] [facility] [place] [facility] JOYCE Greco MD  Hematology and Stem Cell Transplant      Patient was seen and discussed with attending Dr. Lopez.

## 2020-09-14 NOTE — TELEPHONE ENCOUNTER
Received phone call patient stating that his Oncologist placed him on Levaquin 500mg PO QD x 10 days for a UTI.    ----- Message from Vladimir Ornelas RN sent at 9/14/2020 11:35 AM CDT -----    ----- Message -----  From: Quinten Mccarthy  Sent: 9/14/2020  11:19 AM CDT  To: [facility] Post-Kidney Transplant Clinical    Pt calling to speak with coord regarding Rx prescribed by oncologist        [phone]

## 2020-09-18 NOTE — TELEPHONE ENCOUNTER
"----- Message from Hugozurdosandra Aj sent at 9/18/2020  1:19 PM CDT -----  Consult/Advisory:    Name Of Caller: Jayden Winston   Contact Preference?: [phone]    Provider Name: Dr Lopez     What is the nature of the call?:  Pt states the levoFLOXacin (LEVAQUIN) is not helping and he is experiencing burning and constant urination.  Please contact to discuss     Additional Notes:  "Thank you for all that you do for our patients'"           "

## 2020-09-18 NOTE — TELEPHONE ENCOUNTER
patient states wednesday morning his urinary issues started to flair up again. saying it is more of a bladder irritation than burning and the irritated feeling is relieved by urinating so he is going to use the bath room 6 to 7 times a night. denies cloudiness and foul order. patient stated he is still taking levaquin but is worried it is not working and wants an alternative medication  Notified MD.

## 2020-09-18 NOTE — TELEPHONE ENCOUNTER
"----- Message from Cuca Saenz sent at 9/18/2020  2:23 PM CDT -----  Patient Assist    Name of caller:  Alin  Established or New patient?: est   Contact Preference:  [phone]   Does Patient feel the need to see the MD today?  Unclear   Provider name:  John Gould MD   What is the nature of the call?    - pt has a bad infection and states that he called this morning   asking to be contacted he is trying to get something prescribed   for it. Please call and assist.     Additional Notes:   "Thank you for all that you do for our patients'"          "

## 2020-10-01 NOTE — PROGRESS NOTES
HEMATOLOGIC MALIGNANCIES PROGRESS NOTE    IDENTIFYING STATEMENT   Alin Burkett (Alin Burkett) is a 70 y.o. male with a  of 1950 from [place] with the diagnosis of post-translpant lymphoproliferative disorder.      ONCOLOGY HISTORY:    1. Monomorphic post-transplant lymphoproliferative disorder   A. 2020: Noticed left inguinal lymphadenopathy after a dog bite (failed to resolve with antibiotics)              B. 2020: Saw Dr. Collins in infectious diseases for inguinal lymphadenopathy - referred for biopsy              C. 2020: Core biopsy of L inguinal lymph node shows B-cell lymphoma of germinal center origin; EBV-positive by LONNIE; morphology nondiagnostic of PTLD vs follicular lymphoma              D. 2020: PET/CT shows left internal iliac chain node measuring 3.3 x 3 cm with SUV max 37; L inguinal node measures 3.2 x 2.4 cm with SUV max 36   E. 2020: Excisional biopsy of left inguinal node shows monomorphic post-transplant lymphoproliferative disorder (DLBCL, GCB 60%, follicular lymphoma, grade 3B 40%); FISH for MYC rearrangement is negative   F. 2020: Bone marrow biopsy shows no evidence of B-cell lymphoma   G. 2020: Cycle 1 R-CHOP; course complicated by pansensitive E. Coli UTI              H. 2020: Cycle 2 R-CHOP: tolerated well except possible UTI for which he was treated empirically with antibiotics      2. History of alcohol abuse, quit   3. Hypertension, complicated by left ventricular hypertrophy  4. Supraventricular tachycardia  5. Abdominal aortic atherosclerosis with aneurysm  6. S/p kidney transplant 2016 c/b AVR s/p thymo 2017  7. Benign prostatic hyperplasia  8. Secondary hyperparathryoidism  9. Obstructive sleep apnea  10. Chronic rhinorrhea     INTERVAL HISTORY:      Mr. Burkett returns to clinic for Cycle 4 day 1 of R-CHOP chemotherapy.  Reports feeling awful today. Has had chills the past few days, has not taken his temperature. Reports  feeling weak and dizzy. States he has fallen twice. Has productive cough, rhinorrhea (which is chronic). Low appetite, intermittent nausea and abdominal cramps. Deals chronically with diarrhea.       Past Medical History, Past Social History and Past Family History have been reviewed and are unchanged except as noted in the interval history.    MEDICATIONS:     Current Outpatient Medications on File Prior to Visit   Medication Sig Dispense Refill    allopurinoL (ZYLOPRIM) 300 MG tablet Take 1 tablet (300 mg total) by mouth once daily. 30 tablet 2    calcitRIOL (ROCALTROL) 0.5 MCG Cap Take 1 capsule (0.5 mcg total) by mouth once daily. 30 capsule 11    COMBIGAN 0.2-0.5 % Drop INSTILL 1 DROP INTO EACH EYE TWICE DAILY      famotidine (PEPCID) 20 MG tablet TAKE 1 TABLET EVERY EVENING 90 tablet 3    HYDROcodone-acetaminophen (NORCO) 5-325 mg per tablet Take 1 tablet by mouth every 6 (six) hours as needed for Pain. 5 tablet 0    ketoconazole (NIZORAL) 200 mg Tab TAKE ONE-HALF (1/2) TABLET ONCE DAILY 45 tablet 3    levothyroxine (SYNTHROID) 100 MCG tablet TAKE 1 TABLET DAILY 90 tablet 3    magnesium oxide (MAG-OX) 400 mg (241.3 mg magnesium) tablet Take 1 tablet (400 mg total) by mouth once daily. 90 tablet 3    metoprolol tartrate (LOPRESSOR) 25 MG tablet TAKE ONE-HALF (1/2) TABLET TWICE A DAY 90 tablet 3    multivitamin (ONE DAILY MULTIVITAMIN) per tablet Take 1 tablet by mouth once daily.      ondansetron (ZOFRAN-ODT) 8 MG TbDL Dissolve 1 tablet (8 mg total) by mouth every 12 (twelve) hours as needed. 21 tablet 1    predniSONE (DELTASONE) 50 MG Tab Take 2 tablets (100 mg total) by mouth once daily. Take on days 2-5 of your chemotherapy cycles. 8 tablet 5    promethazine (PHENERGAN) 12.5 MG Tab Take 1 tablet (12.5 mg total) by mouth every 6 (six) hours as needed. 30 tablet 1    sodium bicarbonate 650 MG tablet Take 1 tablet (650 mg total) by mouth 2 (two) times daily. 540 tablet 3    tacrolimus (PROGRAF) 1  MG Cap Take 3 capsules (3 mg total) by mouth every morning AND 2 capsules (2 mg total) every evening. Z94.0/Kidney Transplant on 11/26/16. 150 capsule 11    travoprost (TRAVATAN Z) 0.004 % ophthalmic solution INSTILL 1 DROP INTO EACH EYE ONCE DAILY AT NIGHT      [DISCONTINUED] sulfamethoxazole-trimethoprim 800-160mg (BACTRIM DS) 800-160 mg Tab Take 1 tablet by mouth 2 (two) times daily. for 14 days 28 tablet 0    aspirin (ECOTRIN) 81 MG EC tablet Take 1 tablet (81 mg total) by mouth once daily.  0    LORazepam (ATIVAN) 0.5 MG tablet Take 1 tablet (0.5 mg total) by mouth once. 30 minutes before procedure for 1 dose (Patient not taking: Reported on 8/28/2020) 2 tablet 0    oxyCODONE (ROXICODONE) 5 MG immediate release tablet Take 1 tablet (5 mg total) by mouth every 8 (eight) hours as needed for Pain. 6 tablet 0    pep injection Inject 0.25 ml as directed.   May increase by 0.05 ml such as 0.30, 0.35 ml etc. Up to 1.00 ml Until adequate result is achieved.    For compounding pharmacy use:   Add PAPAVERINE 30 mcg  Add PHENTOLAMINE 10 mg  Add ALPROSTADIL 100 mcg 1 vial 2    phenazopyridine (PYRIDIUM) 200 MG tablet Take 1 tablet (200 mg total) by mouth 3 (three) times daily as needed for Pain. 20 tablet 0     No current facility-administered medications on file prior to visit.        ALLERGIES: Review of patient's allergies indicates:  No Known Allergies     ROS:       Review of Systems   Constitutional: Negative for diaphoresis, fatigue, fever and unexpected weight change.   HENT:   Negative for lump/mass and sore throat.    Eyes: Negative for icterus.   Respiratory: Negative for cough and shortness of breath.    Cardiovascular: Negative for chest pain and palpitations.   Gastrointestinal: Negative for abdominal distention, constipation, diarrhea, nausea and vomiting.   Genitourinary: Negative for dysuria.    Musculoskeletal: Negative for arthralgias, gait problem and myalgias.   Skin: Negative for rash.  "  Neurological: Negative for dizziness, gait problem and headaches.   Psychiatric/Behavioral: The patient is not nervous/anxious.        PHYSICAL EXAM:  Vitals:    10/02/20 0930   BP: (!) 85/56   Pulse: 95   Resp: 18   Temp: 98.4 °F (36.9 °C)   SpO2: 98%   Weight: 65.8 kg (145 lb)   Height: 5' 11" (1.803 m)   PainSc: 0-No pain       KARNOFSKY PERFORMANCE STATUS 80%  ECOG 1    Physical Exam  Constitutional:       General: He is not in acute distress.     Appearance: He is well-developed.   HENT:      Head: Normocephalic and atraumatic.      Mouth/Throat:      Mouth: No oral lesions.   Eyes:      Conjunctiva/sclera: Conjunctivae normal.   Neck:      Thyroid: No thyromegaly.   Cardiovascular:      Rate and Rhythm: Normal rate and regular rhythm.      Heart sounds: Normal heart sounds.   Pulmonary:      Breath sounds: Normal breath sounds.   Abdominal:      General: There is no distension.      Palpations: Abdomen is soft. There is no hepatomegaly, splenomegaly or mass.      Tenderness: There is no abdominal tenderness. There is no right CVA tenderness or left CVA tenderness.   Musculoskeletal:         General: No swelling.      Comments: No further flank or spinal tenderness    Skin:     Findings: No rash.   Neurological:      Mental Status: He is alert and oriented to person, place, and time.      Coordination: Coordination normal.      Deep Tendon Reflexes: Reflexes are normal and symmetric.         LAB:   Results for orders placed or performed in visit on 10/02/20   CBC auto differential   Result Value Ref Range    WBC 21.08 (H) 3.90 - 12.70 K/uL    RBC 3.65 (L) 4.60 - 6.20 M/uL    Hemoglobin 11.5 (L) 14.0 - 18.0 g/dL    Hematocrit 36.5 (L) 40.0 - 54.0 %    Mean Corpuscular Volume 100 (H) 82 - 98 fL    Mean Corpuscular Hemoglobin 31.5 (H) 27.0 - 31.0 pg    Mean Corpuscular Hemoglobin Conc 31.5 (L) 32.0 - 36.0 g/dL    RDW 16.7 (H) 11.5 - 14.5 %    Platelets 266 150 - 350 K/uL    MPV 11.0 9.2 - 12.9 fL    Immature " Granulocytes 0.6 (H) 0.0 - 0.5 %    Gran # (ANC) 18.9 (H) 1.8 - 7.7 K/uL    Immature Grans (Abs) 0.13 (H) 0.00 - 0.04 K/uL    Lymph # 0.5 (L) 1.0 - 4.8 K/uL    Mono # 1.4 (H) 0.3 - 1.0 K/uL    Eos # 0.0 0.0 - 0.5 K/uL    Baso # 0.05 0.00 - 0.20 K/uL    nRBC 0 0 /100 WBC    Gran% 89.9 (H) 38.0 - 73.0 %    Lymph% 2.5 (L) 18.0 - 48.0 %    Mono% 6.7 4.0 - 15.0 %    Eosinophil% 0.1 0.0 - 8.0 %    Basophil% 0.2 0.0 - 1.9 %    Differential Method Automated    Comprehensive metabolic panel   Result Value Ref Range    Sodium 137 136 - 145 mmol/L    Potassium 3.5 3.5 - 5.1 mmol/L    Chloride 110 95 - 110 mmol/L    CO2 19 (L) 23 - 29 mmol/L    Glucose 103 70 - 110 mg/dL    BUN, Bld 22 8 - 23 mg/dL    Creatinine 2.0 (H) 0.5 - 1.4 mg/dL    Calcium 9.6 8.7 - 10.5 mg/dL    Total Protein 6.6 6.0 - 8.4 g/dL    Albumin 3.3 (L) 3.5 - 5.2 g/dL    Total Bilirubin 0.7 0.1 - 1.0 mg/dL    Alkaline Phosphatase 76 55 - 135 U/L    AST 25 10 - 40 U/L    ALT 19 10 - 44 U/L    Anion Gap 8 8 - 16 mmol/L    eGFR if African American 38.2 (A) >60 mL/min/1.73 m^2    eGFR if non  33.1 (A) >60 mL/min/1.73 m^2   Lactate Dehydrogenase   Result Value Ref Range     110 - 260 U/L     *Note: Due to a large number of results and/or encounters for the requested time period, some results have not been displayed. A complete set of results can be found in Results Review.       PROBLEMS ASSESSED THIS VISIT:    1. Diffuse large B-cell lymphoma of lymph nodes of multiple regions    2. Viral URI with cough    3. Recurrent UTI    4. Anemia associated with chemotherapy    5. Hyperuricemia    6. Stage 3b chronic kidney disease    7. S/P kidney transplant        PLAN:        PTLD  He is here for Cycle 4, day 1 of R-CHOP chemotherapy. Does not feel well today. Hypotensive and with worsening kidney function. Will hold chemo today. Giving IVF. Will recheck labs Monday and then schedule chemo for next Friday    Anemia: chemo  Stable; no need for  transfusion  Likely rebound leukocytosis from Neulasta OBI last cycle    Recurrent UTIs  Has had two UTIs following chemo cycles. Last treated with bactrim. Will do ppx cefpidoxime 100mg BID with the next three cycles of chemo to hopefully prevent recurrence    Hyperuricemia  Continue allopurinol as prophylaxis for tumor lysis syndrome.    CKD-III  S/p kidney transplant  He has been instructed to d/c mycophenolate  He was told to f/u with kidney transplant about steroids as his chemo include prednisone 100 mg PO daily x5 days with each cycles  Will monitor carefully and renally dose medications.  Creatinine up to 2.0 today; will give extra 500cc fluid today with chemo; baseline s/p transplant ~1.6    Chronic rhinorrhea/Viral URI  Unlikely to be a sign of nasopharyngeal malignancy, we will refer to ENT once he is done with his chemotherapy course for PTLD.  With addition of productive cough today will swab for covid/flu/RIP       Follow-up  Labs on Monday (CBC, CMP) in [place]  Labs (cbc, cmp) and chair for [place] [facility] [place]  PET scan, labs (CBC, CMP, LDH), visit with Dr. Lopez and [facility]      Nelly Toscano NP  Hematology and Stem Cell Transplant

## 2020-10-02 NOTE — Clinical Note
Labs on Monday (CBC, CMP) in [place]  Labs (cbc, cmp) and chair for chemo Friday 10/9 here at [facility]  PET scan, labs (CBC, CMP, LDH), visit with Dr. Lopez and chair for cycle 5 of RCHOP on 10/30/20

## 2020-10-09 NOTE — TELEPHONE ENCOUNTER
"  Reason for Disposition   [1] Caller has URGENT medication question about med that PCP or specialist prescribed AND [2] triager unable to answer question    Additional Information   Negative: Drug overdose and triager unable to answer question   Negative: Caller requesting information unrelated to medicine   Negative: Caller requesting a prescription for Strep throat and has a positive culture result   Negative: Rash while taking a medication or within 3 days of stopping it   Negative: Immunization reaction suspected   Negative: [1] Asthma and [2] having symptoms of asthma (cough, wheezing, etc.)   Negative: [1] Influenza symptoms AND [2] anti-viral med prescription request, such as Tamiflu   Negative: [1] Symptom of illness (e.g., headache, abdominal pain, earache, vomiting) AND [2] more than mild   Negative: MORE THAN A DOUBLE DOSE of a prescription or over-the-counter (OTC) drug   Negative: [1] DOUBLE DOSE (an extra dose or lesser amount) of over-the-counter (OTC) drug AND [2] any symptoms (e.g., dizziness, nausea, pain, sleepiness)   Negative: [1] DOUBLE DOSE (an extra dose or lesser amount) of prescription drug AND [2] any symptoms (e.g., dizziness, nausea, pain, sleepiness)   Negative: Took another person's prescription drug   Negative: [1] Pharmacy calling with prescription questions AND [2] triager unable to answer question   Negative: [1] Prescription not at pharmacy AND [2] was prescribed by PCP recently   Negative: [1] Request for URGENT new prescription or refill of "essential" medication (i.e., likelihood of harm to patient if not taken) AND [2] triager unable to fill per unit policy   Negative: [1] DOUBLE DOSE (an extra dose or lesser amount) of prescription drug AND [2] NO symptoms (Exception: a double dose of antibiotics)   Negative: Diabetes drug error or overdose (e.g., took wrong type of insulin or took extra dose)    Protocols used: MEDICATION QUESTION CALL-A-  Kidney " Transplant - 11/26/2016 (#1)  BPA 9/16  CC spouse needs steroid transferred to [facility] as it wasn't brought to bedside during chemo.  Wife wants to know if pt needs to remain on Bactrim and allopurinolol. Supposed to be on Bactrim prophylactically. Left message for MD on call. spouse states that pt on vantin not bactrim and that was already sent to [facility]. Only needs steroid sent to [facility].   [facility] pharm in [place]  rx left on pharm VM at 659pm

## 2020-10-09 NOTE — PLAN OF CARE
1450-Patient tolerated treatment well, neulasta onbody was placed on patients abdomen and he verbalized understanding monitoring and use of device. Discharged without complaints or S/S of adverse event. AVS given.  Instructed to call provider for any questions or concerns.

## 2020-10-10 NOTE — ED NOTES
Alin SANCHEZ Kwadwo presents to the ED s/p trip and fall.  Patient tripped over electrical cord.  Denies hitting head while falling.  Has skin tears to bilateral forearms.

## 2020-10-10 NOTE — ED PROVIDER NOTES
Encounter Date: 10/10/2020    SCRIBE #1 NOTE: I, Vincenzo Purvis, am scribing for, and in the presence of, Dr. Vega.       History     Chief Complaint   Patient presents with    Fall     tripped & fell earlier today - presents with bilat arm skin tears     Time seen by provider: 3:33 PM on 10/10/2020      Alin Burkett is a 70 y.o. male with a PMHx of lymphoma, HTN, kidney transplant, and diverticulosis who presents to the ED for a laceration status post fall that occurred 7 hours ago. The patient reports that he was walking outside, when he tripped over his pool cord and fell onto the ground. He denies LOC, neck pain, and back pain. He states that he has two lacerations, one to his right forearm region and the other to his left elbow. Wife states that she is afraid these lacerations will require sutures. Patient denies chest pain, SOB, abdominal pain, fever, extremity pain, swelling, or any other complaint at this time. The patient is currently a chemotherapy patient and his last chemotherapy was 10/09/2020. The patient has a PSHx of aorta-superior mesenteric artery bypass graft, hernia repair, and kidney transplant.    The history is provided by the patient.     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  Renal allograft biopsy 17 (DIVINE): 21 glomeruli, none globally sclerosed, <5% interstitial  fibrosis, no ACR, c4d negative, AVR CCT Type 2 (V1 lesion); plan THYMO     Dissecting aortic aneurysm (any part), abdominal     Diverticulosis     Encounter for blood transfusion     ESRD (end stage renal disease) 06/16/2010    H/O urethral stricture 11/27/2018    H/O: urethral stricture     History of AAA (abdominal aortic aneurysm) repair     History of urethral stricture 12/19/2018    Hypertension     Hypokalemia     Hypothyroidism 1/10/2014    Inguinal hernia bilateral, non-recurrent     Kidney stones     Organ transplant candidate 11/26/2013    Plantar warts 1/10/2014    Recurrent UTI 7/28/2017    S/P kidney transplant     Secondary hyperparathyroidism, renal     Thyroid disease      Past Surgical History:   Procedure Laterality Date    ABDOMINAL SURGERY      exploratory lapatomy x 2    ABLATION N/A 8/22/2019    Procedure: ABLATION, SVT;  Surgeon: Emerson Mcmanus MD;  Location: [facility] EP LAB;  Service: Cardiology;  Laterality: N/A;  SVT, RFA, CARTO, anes, GP, 323    AORTA - SUPERIOR MESENTERIC ARTERY BYPASS GRAFT      BLADDER NECK RECONSTRUCTION      BLADDER SURGERY      COLONOSCOPY  10/10/2013    Dr. Gutierrez, repeat in 5 years    CYSTOSCOPY      CYSTOSCOPY N/A 12/19/2018    Procedure: CYSTOSCOPY;  Surgeon: Dewey Mann MD;  Location: [address];  Service: Urology;  Laterality: N/A;  45 min    CYSTOURETHROSCOPY WITH DIRECT VISION INTERNAL URETHROTOMY N/A 12/19/2018    Procedure: CYSTOSCOPY, WITH DIRECT VISION INTERNAL URETHROTOMY;  Surgeon: Dewey Mann MD;  Location: [address];  Service: Urology;  Laterality: N/A;    DILATION OF URETHRA N/A 12/19/2018    Procedure: DILATION, URETHRA;  Surgeon: Dewey Mann MD;  Location: [facility] OR [address] [place] [address];  Service: Urology;  Laterality: N/A;    EXCISIONAL BIOPSY N/A 7/13/2020    Procedure: EXCISIONAL BIOPSY- LEFT INGUINAL NODE;  Surgeon: Vlad Epstein MD;  Location: [facility] OR [address] [place] [address];  Service: General;  Laterality: N/A;     FLEXIBLE CYSTOSCOPY N/A 11/6/2019    Procedure: CYSTOSCOPY, FLEXIBLE;  Surgeon: Dewey Mann MD;  Location: [address];  Service: Urology;  Laterality: N/A;    GASTROJEJUNOSTOMY      HEMORRHOID SURGERY      HERNIA REPAIR      INSERTION OF VENOUS ACCESS PORT Left 7/27/2020    Procedure: INSERTION, VENOUS ACCESS PORT;  Surgeon: Vlad Epstein MD;  Location: [address];  Service: General;  Laterality: Left;    KIDNEY TRANSPLANT      LEFT HEART CATHETERIZATION Left 8/20/2019    Procedure: Left heart cath;  Surgeon: Javan Oscar MD;  Location: [facility] CATH/EP LAB;  Service: Cardiology;  Laterality: Left;    LITHOTRIPSY      LYMPH NODE BIOPSY N/A 6/30/2020    Procedure: BIOPSY, LYMPH NODE;  Surgeon: Ella Diagnostic Provider;  Location: [address];  Service: General;  Laterality: N/A;  189 lymph node biopsy /ULTRASOUND    PERCUTANEOUS NEPHROLITHOTRIPSY      right  ESWL  10/31/12    right ESWL  6/26/12    URETHROPLASTY USING PATCH GRAFT N/A 11/6/2019    Procedure: URETHROPLASTY, USING PATCH GRAFT BUCCAL MUCOSA GRAFT;  Surgeon: Dewey Mann MD;  Location: [address];  Service: Urology;  Laterality: N/A;  3 HOURS     Family History   Problem Relation Age of Onset    Diabetes Mother     Alzheimer's disease Mother     Alcohol abuse Father     HIV Brother     Stroke Maternal Aunt     Kidney disease Paternal Uncle     Kidney disease Cousin     No Known Problems Sister     No Known Problems Daughter     No Known Problems Sister     No Known Problems Brother     No Known Problems Brother     Cancer Brother         thyroid cancer    Colon cancer Brother     Melanoma Neg Hx     Psoriasis Neg Hx     Lupus Neg Hx     Eczema Neg Hx     Colon polyps Neg Hx     Crohn's disease Neg Hx     Ulcerative colitis Neg Hx     Celiac disease Neg Hx      Social History     Tobacco Use    Smoking status: Former Smoker     Packs/day: 0.50     Years: 40.00     Pack years: 20.00      Types: Cigarettes     Quit date: 6/16/2010     Years since quitting: 10.3    Smokeless tobacco: Never Used   Substance Use Topics    Alcohol use: Yes     Comment: occasional/social    Drug use: No     Comment: THC in youth     Review of Systems   Constitutional: Negative for appetite change, chills, fever and unexpected weight change.   HENT: Negative for congestion, ear pain, hearing loss and sore throat.    Eyes: Negative for pain and visual disturbance.   Respiratory: Negative for cough, shortness of breath and wheezing.    Cardiovascular: Negative for chest pain, palpitations and leg swelling.   Gastrointestinal: Negative for abdominal pain, blood in stool, diarrhea, nausea and vomiting.   Genitourinary: Negative for dysuria and hematuria.   Musculoskeletal: Negative for back pain, gait problem, myalgias, neck pain and neck stiffness.   Skin: Positive for wound. Negative for rash.   Neurological: Negative for syncope, weakness, numbness and headaches.   Hematological: Does not bruise/bleed easily.       Physical Exam     Initial Vitals [10/10/20 1454]   BP Pulse Resp Temp SpO2   102/63 95 20 98.3 °F (36.8 °C) 98 %      MAP       --         Physical Exam    Nursing note and vitals reviewed.  Constitutional: He appears well-developed and well-nourished. No distress.   Non-toxic, well-appearing male.   HENT:   Head: Normocephalic and atraumatic.   Eyes: Conjunctivae and EOM are normal. Pupils are equal, round, and reactive to light.   Neck: Neck supple.   Cardiovascular: Normal rate, regular rhythm and normal heart sounds. Exam reveals no gallop and no friction rub.    No murmur heard.  Pulmonary/Chest: Breath sounds normal. No respiratory distress. He has no wheezes. He has no rhonchi. He has no rales.   Abdominal: Soft. Bowel sounds are normal. He exhibits no distension. There is no abdominal tenderness.   Musculoskeletal: Normal range of motion. No tenderness or edema.   Neurological: He is alert and oriented  to person, place, and time.   Skin: Skin is warm and dry.   Stellate laceration to the right mid dorsal forearm. 2 cm curved laceration to the dorsal left elbow   Psychiatric: He has a normal mood and affect.         ED Course   Lac Repair    Date/Time: 10/10/2020 5:25 PM  Performed by: Vincenzo Vega MD  Authorized by: Vincenzo Vega MD     Consent:     Consent obtained:  Verbal    Consent given by:  Patient    Risks discussed:  Infection, need for additional repair, pain, poor cosmetic result and poor wound healing    Alternatives discussed:  No treatment  Anesthesia (see MAR for exact dosages):     Anesthesia method:  Local infiltration    Local anesthetic:  Lidocaine 1% WITH epi  Laceration details:     Location:  Shoulder/arm    Shoulder/arm location:  R lower arm    Length (cm):  2  Repair type:     Repair type:  Simple  Pre-procedure details:     Preparation:  Patient was prepped and draped in usual sterile fashion  Exploration:     Wound extent: no areolar tissue violation noted, no fascia violation noted, no foreign bodies/material noted, no muscle damage noted, no nerve damage noted, no tendon damage noted, no underlying fracture noted and no vascular damage noted      Contaminated: no    Treatment:     Area cleansed with:  Betadine    Amount of cleaning:  Standard    Irrigation solution:  Sterile saline    Irrigation method:  Syringe    Visualized foreign bodies/material removed: no    Skin repair:     Repair method:  Sutures    Suture size:  5-0    Suture material:  Prolene    Suture technique:  Simple interrupted    Number of sutures:  4  Approximation:     Approximation:  Close  Post-procedure details:     Dressing:  Open (no dressing)    Patient tolerance of procedure:  Tolerated well, no immediate complications  Lac Repair    Date/Time: 10/10/2020 5:32 PM  Performed by: Vincenzo Vega MD  Authorized by: Vincenzo Vega MD     Consent:     Consent obtained:  Verbal    Consent given by:   Patient    Risks discussed:  Infection, pain, poor cosmetic result, need for additional repair and poor wound healing    Alternatives discussed:  No treatment  Anesthesia (see MAR for exact dosages):     Anesthesia method:  Local infiltration    Local anesthetic:  Lidocaine 1% WITH epi  Laceration details:     Location:  Shoulder/arm    Shoulder/arm location:  L elbow    Length (cm):  2  Repair type:     Repair type:  Simple  Pre-procedure details:     Preparation:  Patient was prepped and draped in usual sterile fashion  Exploration:     Wound extent: no areolar tissue violation noted, no fascia violation noted, no foreign bodies/material noted, no muscle damage noted, no nerve damage noted, no tendon damage noted, no underlying fracture noted and no vascular damage noted      Contaminated: no    Treatment:     Area cleansed with:  Betadine    Amount of cleaning:  Standard    Irrigation solution:  Sterile saline    Irrigation method:  Syringe    Visualized foreign bodies/material removed: no    Skin repair:     Repair method:  Sutures    Suture size:  5-0    Suture material:  Prolene    Suture technique:  Simple interrupted    Number of sutures:  5  Approximation:     Approximation:  Close  Post-procedure details:     Dressing:  Antibiotic ointment    Patient tolerance of procedure:  Tolerated well, no immediate complications      Labs Reviewed - No data to display       Imaging Results    None          Medical Decision Making:   History:   Old Medical Records: I decided to obtain old medical records.  Cleaned and irrigated copiously.  Patient is stable for discharge with return precautions and needs to follow up with primary care for suture removal in 10-14 days.  Patient had a mechanical fall.  Lacerations were repaired with simple interrupted stitches.  Please see procedure note.  No signs of any significant injury of the lacerations from this fall.  Awake alert and oriented with no signs of head trauma.  He is  already on prophylactic antibiotics for urinary tract infections developed right after chemotherapy.  I do not think he needs further antibiotics            Scribe Attestation:   Scribe #1: I performed the above scribed service and the documentation accurately describes the services I performed. I attest to the accuracy of the note.    I, Dr. Vincenzo Vega personally performed the services described in this documentation. All medical record entries made by the scribe were at my direction and in my presence.  I have reviewed the chart and agree that the record reflects my personal performance and is accurate and complete. Vincenzo Vega MD.  6:57 AM 10/12/2020    DISCLAIMER: This note was prepared with Dragon NaturallySpeaking voice recognition transcription software. Garbled syntax, mangled pronouns, and other bizarre constructions may be attributed to that software system                   Clinical Impression:     ICD-10-CM ICD-9-CM   1. Fall, initial encounter  W19.XXXA E888.9   2. Laceration of left elbow, initial encounter  S51.012A 881.01   3. Laceration of right forearm, initial encounter  S51.811A 881.00                          ED Disposition Condition    Discharge Stable        ED Prescriptions     None        Follow-up Information     Follow up With Specialties Details Why Contact Info    Carmen Krueger MD Family Medicine Schedule an appointment as soon as possible for a visit  For wound re-check 10-14 days [address] [facility] [address]  [phone]      [facility] Emergency Medicine  If symptoms worsen:  Increase pain swelling or redness [address] [place] [address] [id number] [phone]                                       Vincenzo Vega MD  10/12/20 0657

## 2020-10-10 NOTE — ED NOTES
Wounds to bilateral forearms flushed with sterile saline. Cleansed with fluff, betadine and sterile saline.

## 2020-10-12 NOTE — TELEPHONE ENCOUNTER
I have not seen patient since 2018. Needs to get orders from treating onc team.  Will forward to Dr. Toscano

## 2020-10-14 NOTE — TELEPHONE ENCOUNTER
Reviewed to increase oral hydration and offered IV fluids. Patient denies symptoms and will call if he feels like he wants to set up IV fluids

## 2020-10-14 NOTE — TELEPHONE ENCOUNTER
----- Message from Camila Blakely sent at 10/14/2020  8:39 AM CDT -----  Regarding: returning Call  Contact: [phone]  Type:  Patient Returning Call    Who Called:pt  Does the patient know what this is regarding?:no  Would the patient rather a call back or a response via [facility]?call back  Best Call Back Number:[phone]  Additional Information:

## 2020-10-14 NOTE — TELEPHONE ENCOUNTER
----- Message from Gwendolyn Newby sent at 10/14/2020  8:45 AM CDT -----  Contact: Patient  Pt is returning a missed call     Communication preference:: [phone] (M)    Additional info::

## 2020-10-14 NOTE — TELEPHONE ENCOUNTER
"----- Message from Nestor Rocha sent at 10/14/2020  8:15 AM CDT -----  Consult/Advisory:    Name Of Caller: Jayden   Contact Preference?: [phone]    What is the nature of the call?:  Pt's blood pressure is really low. Please contact to discuss     Additional Notes:  "Thank you for all that you do for our patients'"           "

## 2020-10-14 NOTE — TELEPHONE ENCOUNTER
Spoke with patient on phone to assess vitals and symptoms. Patient checked blood pressure while on phone. Blood pressure is 92/62. HR 91. Denies fever. Patient denies dizziness but stated he feels a little weak. Stated he was cooking breakfast. Patient is currently resting and is stating he is feeling better today than the past couple of days. He states he feels like he can eat more. Patient has agreed to focus on hydration and eat a hearty breakfast. Will follow up with patient to check back in on symptoms and vitals.

## 2020-10-15 PROBLEM — R65.21 SEPTIC SHOCK: Status: ACTIVE | Noted: 2020-01-01

## 2020-10-15 PROBLEM — E87.6 HYPOKALEMIA DUE TO EXCESSIVE GASTROINTESTINAL LOSS OF POTASSIUM: Status: ACTIVE | Noted: 2020-01-01

## 2020-10-15 PROBLEM — R50.81 FEBRILE NEUTROPENIA: Status: ACTIVE | Noted: 2020-01-01

## 2020-10-15 PROBLEM — A41.9 SEPTIC SHOCK: Status: ACTIVE | Noted: 2020-01-01

## 2020-10-15 PROBLEM — D70.9 FEBRILE NEUTROPENIA: Status: ACTIVE | Noted: 2020-01-01

## 2020-10-15 NOTE — ED NOTES
Spoke with pt wife. States he always has diarrhea but has worsened over the past 2 weeks. Started new prophylactic antibiotic 3 weeks ago called Vantin per wife. Has been on chemo for about 4 months now. Last chemo Friday. Has not had Prograf in 2 days. Wife concerned about kidney transplant rejection. Also reports he is supposed to be taking steroid with chemo and has not taken it today.

## 2020-10-15 NOTE — TELEPHONE ENCOUNTER
Pt frustrated, he has been trying to reach Dr. Luis Fernando Lopez' office without success, states he needs to be seen.  He is a cancer patient receiving chemotherapy and a kidney txp patient, as well.  States he has a fever of 102.8, and is having profuse liquid stool.  Advised pt go in to the ED immediately for evaluation.  Notified FrankelRN with hem/onc and Lisa Olea RN with abd txp.  Reason for Disposition   Fever > 101 F (38.3 C) and over 60 years of age    Additional Information   Negative: Difficult to awaken or acting confused (e.g., disoriented, slurred speech)   Negative: Pale cold skin and very weak (can't stand)   Negative: Difficulty breathing and bluish (or gray) lips or face   Negative: New onset rash with purple (or blood-colored) spots or dots   Negative: Sounds like a life-threatening emergency to the triager   Negative: Fever onset within 24 hours of receiving vaccine   Negative: Fever within 14 days of COVID-19 Exposure   Negative: Pregnant   Negative: Postpartum (from 0 to 6 weeks after delivery)   Negative: Headache and stiff neck (can't touch chin to chest)   Negative: Difficulty breathing   Negative: IV drug abuse   Negative: Fever > 103 F (39.4 C)    Protocols used: FEVER-A-OH

## 2020-10-15 NOTE — ED NOTES
Pt reports diarrhea x 5 days. Usually always has diarrhea, but worse lately. Pt had B< here, clear, mucous, with pink tinge. States had red gatorade. Denies black/bloodystools. Denies n/v. Reports TMAX home 102.8 today. Hypotensive.Feels fatigued. Hx renal transplant in 2006. Has PORT to left upper chest. Placed on cont cardiac, bp,a nd spo2 monitors.

## 2020-10-15 NOTE — ED PROVIDER NOTES
Encounter Date: 10/15/2020    SCRIBE #1 NOTE: I, Lola Sanderson, am scribing for, and in the presence of, Dilan Castillo MD.       History     Chief Complaint   Patient presents with    Diarrhea     for the past 3 days,last received chemotherapy 6 days ago.     Abdominal Pain       Time seen by provider: 2:54 PM on 10/15/2020    Alin Burkett is a 70 y.o. male with kidney transplant recipient, ESRD, chronic diarrhea, and diffuse large B-cell lymphoma who presents to the ED with an onset of intermittent diarrhea with associated abdominal pain and nausea x 1 week, as well as, fever. Pt is still urinating but believes he may be dehydrated. He is currently undergoing chemotherapy but has not been able to take immunosuppressant medication the last 2 days. The patient denies blood in stool, vomiting, or any other symptoms at this time. PSHx of abdominal surgery, gastrojejunostomy, and colonoscopy.    The history is provided by the patient.     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  Renal allograft biopsy 17 (DIVINE): 21 glomeruli, none globally sclerosed, <5% interstitial fibrosis, no ACR, c4d negative, AVR CCT Type 2 (V1 lesion); plan THYMO     Dissecting aortic aneurysm (any part), abdominal     Diverticulosis     Encounter for blood transfusion     ESRD (end stage renal disease) 2010    H/O  urethral stricture 11/27/2018    H/O: urethral stricture     History of AAA (abdominal aortic aneurysm) repair     History of urethral stricture 12/19/2018    Hypertension     Hypokalemia     Hypothyroidism 1/10/2014    Inguinal hernia bilateral, non-recurrent     Kidney stones     Organ transplant candidate 11/26/2013    Plantar warts 1/10/2014    Recurrent UTI 7/28/2017    S/P kidney transplant     Secondary hyperparathyroidism, renal     Thyroid disease      Past Surgical History:   Procedure Laterality Date    ABDOMINAL SURGERY      exploratory lapatomy x 2    ABLATION N/A 8/22/2019    Procedure: ABLATION, SVT;  Surgeon: Emerson Mcmanus MD;  Location: [facility] EP LAB;  Service: Cardiology;  Laterality: N/A;  SVT, RFA, CARTO, anes, GP, 323    AORTA - SUPERIOR MESENTERIC ARTERY BYPASS GRAFT      BLADDER NECK RECONSTRUCTION      BLADDER SURGERY      COLONOSCOPY  10/10/2013    Dr. Gutierrez, repeat in 5 years    CYSTOSCOPY      CYSTOSCOPY N/A 12/19/2018    Procedure: CYSTOSCOPY;  Surgeon: Dewey Mann MD;  Location: [facility] OR [address] [place] [address];  Service: Urology;  Laterality: N/A;  45 min    CYSTOURETHROSCOPY WITH DIRECT VISION INTERNAL URETHROTOMY N/A 12/19/2018    Procedure: CYSTOSCOPY, WITH DIRECT VISION INTERNAL URETHROTOMY;  Surgeon: Dewey Mann MD;  [facility];  Service: Urology;  Laterality: N/A;    DILATION OF URETHRA N/A 12/19/2018    Procedure: DILATION, URETHRA;  Surgeon: Dewey Mann MD;  [facility];  Service: Urology;  Laterality: N/A;    EXCISIONAL BIOPSY N/A 7/13/2020    Procedure: EXCISIONAL BIOPSY- LEFT INGUINAL NODE;  Surgeon: Vlad Epstein MD;  [facility];  Service: General;  Laterality: N/A;    FLEXIBLE CYSTOSCOPY N/A 11/6/2019    Procedure: CYSTOSCOPY, FLEXIBLE;  Surgeon: Dewey Mann MD;  [facility];  Service: Urology;  Laterality: N/A;    GASTROJEJUNOSTOMY      HEMORRHOID SURGERY      HERNIA REPAIR       INSERTION OF VENOUS ACCESS PORT Left 7/27/2020    Procedure: INSERTION, VENOUS ACCESS PORT;  Surgeon: Vlad Epstein MD;  Location: [facility] OR [facility];  Service: General;  Laterality: Left;    KIDNEY TRANSPLANT      LEFT HEART CATHETERIZATION Left 8/20/2019    Procedure: Left heart cath;  Surgeon: Javan Oscar MD;  Location: [facility] CATH/EP LAB;  Service: Cardiology;  Laterality: Left;    LITHOTRIPSY      LYMPH NODE BIOPSY N/A 6/30/2020    Procedure: BIOPSY, LYMPH NODE;  Surgeon: Ella Diagnostic Provider;  [facility];  Service: General;  Laterality: N/A;  189 lymph node biopsy /ULTRASOUND    PERCUTANEOUS NEPHROLITHOTRIPSY      right  ESWL  10/31/12    right ESWL  6/26/12    URETHROPLASTY USING PATCH GRAFT N/A 11/6/2019    Procedure: URETHROPLASTY, USING PATCH GRAFT BUCCAL MUCOSA GRAFT;  Surgeon: Dewey Mann MD;  [facility];  Service: Urology;  Laterality: N/A;  3 HOURS     Family History   Problem Relation Age of Onset    Diabetes Mother     Alzheimer's disease Mother     Alcohol abuse Father     HIV Brother     Stroke Maternal Aunt     Kidney disease Paternal Uncle     Kidney disease Cousin     No Known Problems Sister     No Known Problems Daughter     No Known Problems Sister     No Known Problems Brother     No Known Problems Brother     Cancer Brother         thyroid cancer    Colon cancer Brother     Melanoma Neg Hx     Psoriasis Neg Hx     Lupus Neg Hx     Eczema Neg Hx     Colon polyps Neg Hx     Crohn's disease Neg Hx     Ulcerative colitis Neg Hx     Celiac disease Neg Hx      Social History     Tobacco Use    Smoking status: Former Smoker     Packs/day: 0.50     Years: 40.00     Pack years: 20.00     Types: Cigarettes     Quit date: 6/16/2010     Years since quitting: 10.3    Smokeless tobacco: Never Used   Substance Use Topics    Alcohol use: Yes     Alcohol/week: 3.0 standard drinks     Types: 3 Cans of beer per week     Comment:  occasional/social    Drug use: No     Comment: THC in youth     Review of Systems   Constitutional: Positive for fever.   Gastrointestinal: Positive for diarrhea and nausea. Negative for blood in stool and vomiting.   Genitourinary: Negative for difficulty urinating.   Allergic/Immunologic: Positive for immunocompromised state.   All other systems reviewed and are negative.      Physical Exam     Initial Vitals [10/15/20 1440]   BP Pulse Resp Temp SpO2   (!) 71/51 (!) 120 18 100 °F (37.8 °C) 95 %      MAP       --         Physical Exam    Nursing note and vitals reviewed.  Constitutional: He appears well-developed and well-nourished. He is not diaphoretic. He appears cachectic.  Non-toxic appearance. He does not have a sickly appearance. He appears ill. No distress.   Chronically-ill appearing.  Cachectic   HENT:   Head: Normocephalic and atraumatic.   Eyes: EOM are normal.   Neck: Normal range of motion. Neck supple. Normal range of motion present. No neck rigidity.   Cardiovascular: Normal rate, regular rhythm and normal pulses. Exam reveals no gallop and no friction rub.    No murmur heard.  Left chest port   Pulmonary/Chest: No respiratory distress.   Abdominal: Soft. Bowel sounds are normal. He exhibits no distension. There is abdominal tenderness in the left lower quadrant. There is no rigidity, no rebound and no guarding.   Multiple abdominal surgical scars. Minimal LLQ tenderness.   Musculoskeletal: Normal range of motion.   Neurological: He is alert and oriented to person, place, and time.   Skin: Skin is warm and dry. No rash noted.   Psychiatric: He has a normal mood and affect. His behavior is normal. Judgment and thought content normal.         ED Course   Critical Care    Date/Time: 10/15/2020 6:17 PM  Performed by: Dilan Castillo MD  Authorized by: Dilan Castillo MD   Direct patient critical care time: 28 minutes  Additional history critical care time: 15 minutes  Ordering / reviewing critical  care time: 10 minutes  Documentation critical care time: 9 minutes  Consulting other physicians critical care time: 5 minutes  Consult with family critical care time: 5 minutes  Total critical care time (exclusive of procedural time) : 72 minutes  Critical care was necessary to treat or prevent imminent or life-threatening deterioration of the following conditions: sepsis (Neutropenic fever).  Critical care was time spent personally by me on the following activities: discussions with consultants, evaluation of patient's response to treatment, obtaining history from patient or surrogate, ordering and review of laboratory studies, pulse oximetry, review of old charts, examination of patient, ordering and performing treatments and interventions, re-evaluation of patient's condition and ordering and review of radiographic studies.        Labs Reviewed   CBC W/ AUTO DIFFERENTIAL - Abnormal; Notable for the following components:       Result Value    WBC 0.20 (*)     RBC 3.66 (*)     Hemoglobin 11.5 (*)     Hematocrit 35.1 (*)     Mean Corpuscular Hemoglobin 31.4 (*)     RDW 15.6 (*)     Platelets 113 (*)     Mono% 0.0 (*)     All other components within normal limits   COMPREHENSIVE METABOLIC PANEL - Abnormal; Notable for the following components:    Potassium 2.6 (*)     CO2 17 (*)     Calcium 7.9 (*)     Total Protein 5.2 (*)     Albumin 2.4 (*)     eGFR if  59 (*)     eGFR if non  51 (*)     All other components within normal limits    Narrative:        critical potassium result(s) called and verbal readback obtained   from Sue Beck at 1606 10/15/20 by [facility] 10/15/2020 16:07   PHOSPHORUS - Abnormal; Notable for the following components:    Phosphorus 2.0 (*)     All other components within normal limits   LACTIC ACID, PLASMA   MAGNESIUM   SARS-COV-2 RNA AMPLIFICATION, QUAL   LACTIC ACID, PLASMA        ECG Results          EKG 12-lead (In process)  Result time 10/16/20 08:20:15    In  process by Interface, Lab In [facility] (10/16/20 08:20:15)                 Narrative:    Test Reason : R00.0,    Vent. Rate : 103 BPM     Atrial Rate : 103 BPM     P-R Int : 182 ms          QRS Dur : 088 ms      QT Int : 342 ms       P-R-T Axes : 042 030 153 degrees     QTc Int : 448 ms    Sinus tachycardia  ST and T wave abnormality, consider lateral ischemia  Abnormal ECG  When compared with ECG of 06-AUG-2020 06:50,  Nonspecific T wave abnormality has replaced inverted T waves in Inferior  leads  T wave inversion less evident in Lateral leads    Referred By: [email or web address]   SELF           Confirmed By:                             Imaging Results          X-Ray Chest AP Portable (Final result)  Result time 10/15/20 15:51:39    Final result by Mariah Irving MD (10/15/20 15:51:39)                 Impression:      There is no evidence acute pulmonary disease.      Electronically signed by: Mariah Irving MD  Date:    10/15/2020  Time:    15:51             Narrative:    EXAMINATION:  XR CHEST AP PORTABLE    CLINICAL HISTORY:  Sepsis;    TECHNIQUE:  Single frontal view of the chest was performed.    COMPARISON:  None    FINDINGS:  There is left-sided Port-A-Cath with the catheter tip in the superior vena cava.  There are left vascular stents seen in the region of the axilla and upper extremity.  There is no pneumothorax, pleural effusion or focal consolidation.                                 Medical Decision Making:   History:   Old Medical Records: I decided to obtain old medical records.  Independently Interpreted Test(s):   I have ordered and independently interpreted EKG Reading(s) - see prior notes  Clinical Tests:   Lab Tests: Reviewed and Ordered  Radiological Study: Ordered and Reviewed  Medical Tests: Ordered and Reviewed            Scribe Attestation:   Scribe #1: I performed the above scribed service and the documentation accurately describes the services I performed. I attest to the accuracy of the  note.    I, Dr. Castillo, personally performed the services described in this documentation. All medical record entries made by the scribe were at my direction and in my presence.  I have reviewed the chart and agree that the record reflects my personal performance and is accurate and complete.6:18 PM 10/16/2020            ED Course as of Oct 16 1142   Thu Oct 15, 2020   1529 BP(!): 82/58 [EF]   1546 Sinus tachycardia 103 beats per minute no ST segment elevation or depression T-wave inversion V4 V5 V6.    [EF]   1608 BP: 102/64 [EF]   1608 Potassium(!!): 2.6 [EF]   1610 Lactate, Paul: 1.3 [EF]   1616 X-Ray Chest AP Portable [EF]   1618 WBC(!!): 0.20 [EF]   1619 Hemoglobin(!): 11.5 [EF]   1619 Platelets(!): 113 [EF]   1711 Case discussed with Dr. Decker of [facility] who will admit the patient    [EF]   1825 BP(!): 87/55 [EF]   1825 Temp(!): 100.5 °F (38.1 °C) [EF]   1825 Temp src: Oral [EF]   1825 Pulse(!): 111 [EF]   1825 Resp: 20 [EF]   1825 SpO2: 100 % [EF]      ED Course User Index  [EF] Dilan Castillo MD            Clinical Impression:     ICD-10-CM ICD-9-CM   1. Tachycardia  R00.0 785.0   2. Febrile neutropenia  D70.9 288.00    R50.81 780.61   3. Septic shock  A41.9 038.9    R65.21 785.52     995.92                      Disposition:   Disposition: Admitted     ED Disposition Condition    Admit                    70-year-old status post kidney transplant 4 years ago, posttransplant lymphoproliferative disorder, recently completed chemo presents with fever and diarrhea.  No acute distress in the ER but he is chronically ill-appearing.  White count 0.2.  Covered with broad-spectrum antibiotics.  Blood pressure increased with IV fluids, consistent with prior blood pressures over the last several weeks.  Case discussed with [facility] here Dr. Decker will admit the patient.         Dilan Castillo MD  10/15/20 1818       Dilan Castillo MD  10/16/20 1142

## 2020-10-15 NOTE — TELEPHONE ENCOUNTER
"----- Message from Nestor Rocha sent at 10/15/2020 12:51 PM CDT -----  Consult/Advisory:    Name Of Caller: Alin   Contact Preference?:[phone]    What is the nature of the call?:  Pt states his condition has not improved and would like a callback to discuss     Additional Notes:  "Thank you for all that you do for our patients'"           "

## 2020-10-16 NOTE — H&P
"[facility] Medicine  History & Physical    Patient Name: Alin Burkett  MRN: [id number]  Admission Date: 10/15/2020  Attending Physician: Brennan Decker MD   Primary Care Provider: Carmen Krueger MD         Patient information was obtained from patient, past medical records and ER records.     Subjective:     Principal Problem:Septic shock    Chief Complaint:   Chief Complaint   Patient presents with    Diarrhea     for the past 3 days,last received chemotherapy 6 days ago.     Abdominal Pain        HPI: Patient has been having diarrhea with "jelly-like" consistency as well as crampiness in the abdomen.  Symptoms began roughly one week ago.  Also has had fever and malaise.  Appetite poor.  Fatigue as well.  He has been receiving chemo for PTLD; most recent cycle of CHOP was end of last week.  He has history of renal transplant four years ago and is currently on twice-a-day  Prograf.  He's had no cough, no unusual headache, no sinus pain, and no burning on urination.  He feels that he's probably dehydrated.    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  Renal allograft biopsy 17 (DIVINE): 21 glomeruli, none globally sclerosed, <5% interstitial fibrosis, no ACR, c4d negative, AVR CCT Type 2 (V1 lesion); plan THYMO     Dissecting aortic aneurysm (any part), abdominal     Diverticulosis     Encounter for blood transfusion     ESRD (end " stage renal disease) 06/16/2010    H/O urethral stricture 11/27/2018    H/O: urethral stricture     History of AAA (abdominal aortic aneurysm) repair     History of urethral stricture 12/19/2018    Hypertension     Hypokalemia     Hypothyroidism 1/10/2014    Inguinal hernia bilateral, non-recurrent     Kidney stones     Organ transplant candidate 11/26/2013    Plantar warts 1/10/2014    Recurrent UTI 7/28/2017    S/P kidney transplant     Secondary hyperparathyroidism, renal     Thyroid disease        Past Surgical History:   Procedure Laterality Date    ABDOMINAL SURGERY      exploratory lapatomy x 2    ABLATION N/A 8/22/2019    Procedure: ABLATION, SVT;  Surgeon: Emerson Mcmanus MD;  Location: [facility] EP LAB;  Service: Cardiology;  Laterality: N/A;  SVT, RFA, CARTO, anes, GP, 323    AORTA - SUPERIOR MESENTERIC ARTERY BYPASS GRAFT      BLADDER NECK RECONSTRUCTION      BLADDER SURGERY      COLONOSCOPY  10/10/2013    Dr. Gutierrez, repeat in 5 years    CYSTOSCOPY      CYSTOSCOPY N/A 12/19/2018    Procedure: CYSTOSCOPY;  Surgeon: Dewey Mann MD;  Location: [address];  Service: Urology;  Laterality: N/A;  45 min    CYSTOURETHROSCOPY WITH DIRECT VISION INTERNAL URETHROTOMY N/A 12/19/2018    Procedure: CYSTOSCOPY, WITH DIRECT VISION INTERNAL URETHROTOMY;  Surgeon: Dewey Mann MD;  Location: [address];  Service: Urology;  Laterality: N/A;    DILATION OF URETHRA N/A 12/19/2018    Procedure: DILATION, URETHRA;  Surgeon: Dewey Mann MD;  [facility];  Service: Urology;  Laterality: N/A;    EXCISIONAL BIOPSY N/A 7/13/2020    Procedure: EXCISIONAL BIOPSY- LEFT INGUINAL NODE;  Surgeon: Vlad Epstein MD;  [facility];  Service: General;  Laterality: N/A;    FLEXIBLE CYSTOSCOPY N/A 11/6/2019    Procedure: CYSTOSCOPY, FLEXIBLE;  Surgeon: Dewey Mann MD;  Location: [facility] OR [address] [place] [address];  Service: Urology;  Laterality: N/A;    GASTROJEJUNOSTOMY       HEMORRHOID SURGERY      HERNIA REPAIR      INSERTION OF VENOUS ACCESS PORT Left 7/27/2020    Procedure: INSERTION, VENOUS ACCESS PORT;  Surgeon: Vlad Epstein MD;  Location: [facility] OR [address] [place] [address];  Service: General;  Laterality: Left;    KIDNEY TRANSPLANT      LEFT HEART CATHETERIZATION Left 8/20/2019    Procedure: Left heart cath;  Surgeon: Javan Oscar MD;  Location: [facility] CATH/EP LAB;  Service: Cardiology;  Laterality: Left;    LITHOTRIPSY      LYMPH NODE BIOPSY N/A 6/30/2020    Procedure: BIOPSY, LYMPH NODE;  Surgeon: [facility] Diagnostic Provider;  [address];  Service: General;  Laterality: N/A;  189 lymph node biopsy /ULTRASOUND    PERCUTANEOUS NEPHROLITHOTRIPSY      right  ESWL  10/31/12    right ESWL  6/26/12    URETHROPLASTY USING PATCH GRAFT N/A 11/6/2019    Procedure: URETHROPLASTY, USING PATCH GRAFT BUCCAL MUCOSA GRAFT;  Surgeon: Dewey Mann MD;  Location: [address];  Service: Urology;  Laterality: N/A;  3 HOURS       Review of patient's allergies indicates:  No Known Allergies    No current facility-administered medications on file prior to encounter.      Current Outpatient Medications on File Prior to Encounter   Medication Sig    allopurinoL (ZYLOPRIM) 300 MG tablet Take 1 tablet (300 mg total) by mouth once daily.    aspirin (ECOTRIN) 81 MG EC tablet Take 1 tablet (81 mg total) by mouth once daily.    calcitRIOL (ROCALTROL) 0.5 MCG Cap Take 1 capsule (0.5 mcg total) by mouth once daily.    cefpodoxime (VANTIN) 100 MG tablet Take 1 tablet (100 mg total) by mouth every 12 (twelve) hours.    COMBIGAN 0.2-0.5 % Drop Place 1 drop into both eyes 2 (two) times a day.     famotidine (PEPCID) 20 MG tablet TAKE 1 TABLET EVERY EVENING (Patient taking differently: Take 20 mg by mouth every evening. )    ketoconazole (NIZORAL) 200 mg Tab TAKE ONE-HALF (1/2) TABLET ONCE DAILY (Patient taking differently: Take 100 mg by mouth once daily. )    levothyroxine (SYNTHROID)  100 MCG tablet TAKE 1 TABLET DAILY (Patient taking differently: Take 100 mcg by mouth before breakfast. Administer on an empty stomach at least 30 minutes before food. If receiving tube feeds, HOLD tube feeds for 1 hour before and after levothyroxine administration.)    magnesium oxide (MAG-OX) 400 mg (241.3 mg magnesium) tablet Take 1 tablet (400 mg total) by mouth once daily.    multivitamin (ONE DAILY MULTIVITAMIN) per tablet Take 1 tablet by mouth once daily.    ondansetron (ZOFRAN-ODT) 8 MG TbDL Dissolve 1 tablet (8 mg total) by mouth every 12 (twelve) hours as needed. (Patient taking differently: Take 8 mg by mouth every 12 (twelve) hours as needed (nausea). )    predniSONE (DELTASONE) 50 MG Tab Take 2 tablets (100 mg total) by mouth once daily. Take on days 2-5 of your chemotherapy cycles.    sodium bicarbonate 650 MG tablet Take 1 tablet (650 mg total) by mouth 2 (two) times daily.    tacrolimus (PROGRAF) 1 MG Cap Take 3 capsules (3 mg total) by mouth every morning AND 2 capsules (2 mg total) every evening. Z94.0/Kidney Transplant on 11/26/16.    travoprost (TRAVATAN Z) 0.004 % ophthalmic solution Place 1 drop into both eyes every evening.     [DISCONTINUED] HYDROcodone-acetaminophen (NORCO) 5-325 mg per tablet Take 1 tablet by mouth every 6 (six) hours as needed for Pain.    [DISCONTINUED] LORazepam (ATIVAN) 0.5 MG tablet Take 1 tablet (0.5 mg total) by mouth once. 30 minutes before procedure for 1 dose (Patient not taking: Reported on 8/28/2020)    [DISCONTINUED] metoprolol tartrate (LOPRESSOR) 25 MG tablet TAKE ONE-HALF (1/2) TABLET TWICE A DAY    [DISCONTINUED] oxyCODONE (ROXICODONE) 5 MG immediate release tablet Take 1 tablet (5 mg total) by mouth every 8 (eight) hours as needed for Pain.    [DISCONTINUED] pep injection Inject 0.25 ml as directed.   May increase by 0.05 ml such as 0.30, 0.35 ml etc. Up to 1.00 ml Until adequate result is achieved.    For compounding pharmacy use:   Add  PAPAVERINE 30 mcg  Add PHENTOLAMINE 10 mg  Add ALPROSTADIL 100 mcg    [DISCONTINUED] phenazopyridine (PYRIDIUM) 200 MG tablet Take 1 tablet (200 mg total) by mouth 3 (three) times daily as needed for Pain.    [DISCONTINUED] promethazine (PHENERGAN) 12.5 MG Tab Take 1 tablet (12.5 mg total) by mouth every 6 (six) hours as needed.    [DISCONTINUED] sulfamethoxazole-trimethoprim 800-160mg (BACTRIM DS) 800-160 mg Tab Take 1 tablet by mouth twice daily for 14 days     Family History     Problem Relation (Age of Onset)    Alcohol abuse Father    Alzheimer's disease Mother    Cancer Brother    Colon cancer Brother    Diabetes Mother    HIV Brother    Kidney disease Paternal Uncle, Cousin    No Known Problems Sister, Daughter, Sister, Brother, Brother    Stroke Maternal Aunt        Tobacco Use    Smoking status: Former Smoker     Packs/day: 0.50     Years: 40.00     Pack years: 20.00     Types: Cigarettes     Quit date: 6/16/2010     Years since quitting: 10.3    Smokeless tobacco: Never Used   Substance and Sexual Activity    Alcohol use: Yes     Alcohol/week: 3.0 standard drinks     Types: 3 Cans of beer per week     Comment: occasional/social    Drug use: No     Comment: THC in youth    Sexual activity: Yes     Partners: Female     Birth control/protection: None     Review of Systems   Constitutional: Positive for activity change, appetite change, fatigue and fever. Negative for chills.   HENT: Negative for congestion and sinus pressure.    Eyes: Negative for pain and visual disturbance.   Respiratory: Negative for cough, shortness of breath and wheezing.    Cardiovascular: Negative for chest pain, palpitations and leg swelling.   Gastrointestinal: Positive for abdominal pain and diarrhea. Negative for nausea and vomiting.   Genitourinary: Positive for decreased urine volume. Negative for difficulty urinating, dysuria and hematuria.   Musculoskeletal: Negative for arthralgias and myalgias.   Skin: Negative for  rash and wound.   Neurological: Negative for dizziness, weakness, light-headedness and headaches.   Hematological: Negative for adenopathy. Does not bruise/bleed easily.   Psychiatric/Behavioral: Negative for confusion and dysphoric mood. The patient is not nervous/anxious.      Objective:     Vital Signs (Most Recent):  Temp: 98.8 °F (37.1 °C) (10/15/20 2230)  Pulse: 102 (10/15/20 2300)  Resp: (!) 23 (10/15/20 2300)  BP: (!) 107/58 (10/15/20 2300)  SpO2: 100 % (10/15/20 2300) Vital Signs (24h Range):  Temp:  [98.8 °F (37.1 °C)-101.8 °F (38.8 °C)] 98.8 °F (37.1 °C)  Pulse:  [] 102  Resp:  [16-25] 23  SpO2:  [95 %-100 %] 100 %  BP: ()/() 107/58     Weight: 66.9 kg (147 lb 7.8 oz)  Body mass index is 20.57 kg/m².    Physical Exam  Constitutional:       General: He is not in acute distress.     Appearance: He is normal weight. He is ill-appearing.   HENT:      Head: Normocephalic and atraumatic.      Right Ear: External ear normal.      Left Ear: External ear normal.      Mouth/Throat:      Mouth: Mucous membranes are dry.      Pharynx: No oropharyngeal exudate.   Eyes:      General: No scleral icterus.        Right eye: No discharge.         Left eye: No discharge.      Conjunctiva/sclera: Conjunctivae normal.   Neck:      Musculoskeletal: Normal range of motion and neck supple.      Thyroid: No thyromegaly.      Vascular: No JVD.   Cardiovascular:      Rate and Rhythm: Regular rhythm. Tachycardia present.      Heart sounds: No gallop.    Pulmonary:      Effort: Pulmonary effort is normal.      Breath sounds: Normal breath sounds. No wheezing.   Abdominal:      General: Bowel sounds are normal. There is no distension.      Palpations: Abdomen is soft. There is no mass.      Tenderness: There is abdominal tenderness in the left lower quadrant. There is no left CVA tenderness or rebound.   Musculoskeletal:         General: No deformity.   Lymphadenopathy:      Cervical: No cervical adenopathy.    Skin:     General: Skin is warm and dry.      Capillary Refill: Capillary refill takes less than 2 seconds.      Findings: No rash.   Neurological:      Mental Status: He is alert and oriented to person, place, and time.   Psychiatric:         Behavior: Behavior normal.             Significant Labs:   BMP:   Recent Labs   Lab 10/15/20  1520         K 2.6*      CO2 17*   BUN 20   CREATININE 1.4   CALCIUM 7.9*   MG 2.3     CBC:   Recent Labs   Lab 10/15/20  1520   WBC 0.20*   HGB 11.5*   HCT 35.1*   *     Lactic Acid:   Recent Labs   Lab 10/15/20  1520 10/15/20  1855   LACTATE 1.3 2.0       Significant Imaging: I have reviewed all pertinent imaging results/findings within the past 24 hours.    Assessment/Plan:     * Septic shock  Unsure of source.  UA ordered; not collected yet.  Empiric antibiotcs: vancomycin and cefepime.  BP remains low, even after a bolus of saline at 30 ml/kg.  Continue norepinephrine drip.  Monitor lactate level.      Hypokalemia due to excessive gastrointestinal loss of potassium  Give supplement and monitor level.    Potassium   Date Value Ref Range Status   10/15/2020 2.6 (LL) 3.5 - 5.1 mmol/L Final     Comment:     critical potassium result(s) called and verbal readback obtained   from Sue Beck at 1606 10/15/20 by [facility] 10/15/2020 16:07     10/09/2020 3.5 3.5 - 5.1 mmol/L Final         Febrile neutropenia  No evidence of specific infection.  Will treat empirically with cefepime and vancomycin.  Monitor leukocyte count.  Will consult with hematologist for help.    Post-transplant lymphoproliferative disorder (PTLD)  Has been receiving chemo for this.      Stage 3 chronic kidney disease  Creatine stable for now. BMP reviewed- noted Estimated Creatinine Clearance: 47.1 mL/min (based on SCr of 1.4 mg/dL). according to latest data. Monitor UOP and serial BMP and adjust therapy as needed. Renally dose meds.      -donor kidney transplant  Continue Prograf at  usual dose.      Acquired hypothyroidism  Continue levothyroxine.    Anemia in stage 3 chronic kidney disease  Patient's anemia is currently controlled. Has not received any PRBCs to date.. Etiology likely d/t CKD  Current CBC reviewed-   Lab Results   Component Value Date    HGB 11.5 (L) 10/15/2020    HCT 35.1 (L) 10/15/2020     Monitor serial CBC and transfuse if patient becomes hemodynamically unstable, symptomatic or H/H drops below 7/21.           VTE Risk Mitigation (From admission, onward)         Ordered     IP VTE LOW RISK PATIENT  Once      10/15/20 2220              Critical care time spent on the evaluation and treatment of severe organ dysfunction, review of pertinent labs and imaging studies, discussions with consulting providers and discussions with patient/family: 38 minutes.     Brennan Decker MD  Department of Hospital Medicine   [facility]

## 2020-10-16 NOTE — ASSESSMENT & PLAN NOTE
Patient's anemia is currently controlled. Has not received any PRBCs to date.. Etiology likely d/t CKD  Current CBC reviewed-   Lab Results   Component Value Date    HGB 11.5 (L) 10/15/2020    HCT 35.1 (L) 10/15/2020     Monitor serial CBC and transfuse if patient becomes hemodynamically unstable, symptomatic or H/H drops below 7/21.

## 2020-10-16 NOTE — ED NOTES
Pt to be transferred to ICU room 502A via hospital stretcher and [facility]. Report given to BRUNO Jose. Pt belongings at . IV infusion to be continued and unchanged during transport. VSS.

## 2020-10-16 NOTE — CONSULTS
Consult Note  Infectious Disease    Reason for Consult:  C difficile colitis, neutropenia, sepsis    HPI: Alin Burkett is a   70 y.o. male who is status post renal transplant and is receiving chemotherapy for diffuse large B-cell lymphoma, presented to the emergency room on 10/15 with worsening of chronic diarrhea, abdominal cramps of 1 weeks duration.  He was found to be neutropenic with a white blood cell count of 0.2, volume depleted, hypokalemic, was cultured and given fluid resuscitation and vancomycin and cefepime.  He was admitted to the intensive care unit. He was recently given cefpodoxime to take prophylactically associated with his chemotherapy for recurring urinary tract infections.  Most recent positive urine culture was  with E coli sensitive to 3rd generation cephalosporins carbapenems  He has had a hectic fever, stool for C difficile toxin was obtained and was resulted as positive today.  he has had a positive C difficile test before, 2019.  White blood cells remain depressed at 0.1, platelets are depressed at 91,000, creatinine 1.6.  CT scan of the abdomen obtained last night shows severe proctocolitis without megacolon. Neupogen has been ordered. He is discouraged from eating by severe cramps.    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  Renal allograft biopsy  1/6/17 (DIV[facility]): 21 glomeruli, none globally sclerosed, <5% interstitial fibrosis, no ACR, c4d negative, AVR CCT Type 2 (V1 lesion); plan THYMO     Dissecting aortic aneurysm (any part), abdominal     Diverticulosis     Encounter for blood transfusion     ESRD (end stage renal disease) 06/16/2010    H/O urethral stricture 11/27/2018    H/O: urethral stricture     History of AAA (abdominal aortic aneurysm) repair     History of urethral stricture 12/19/2018    Hypertension     Hypokalemia     Hypothyroidism 1/10/2014    Inguinal hernia bilateral, non-recurrent     Kidney stones     Organ transplant candidate 11/26/2013    Plantar warts 1/10/2014    Recurrent UTI 7/28/2017    S/P kidney transplant     Secondary hyperparathyroidism, renal     Thyroid disease     EBV positive large B-cell lymphoma diagnosed June 2020, consistent with post transplant lymphoproliferative disorder  Chemotherapy commenced 07/29/2020  Past Surgical History:   Procedure Laterality Date    ABDOMINAL SURGERY      exploratory lapatomy x 2    ABLATION N/A 8/22/2019    Procedure: ABLATION, SVT;  Surgeon: Emerson Mcmanus MD;  Location: [facility] EP LAB;  Service: Cardiology;  Laterality: N/A;  SVT, RFA, CARTO, anes, GP, 323    AORTA - SUPERIOR MESENTERIC ARTERY BYPASS GRAFT      BLADDER NECK RECONSTRUCTION      BLADDER SURGERY      COLONOSCOPY  10/10/2013    Dr. Gutierrez, repeat in 5 years    CYSTOSCOPY      CYSTOSCOPY N/A 12/19/2018    Procedure: CYSTOSCOPY;  Surgeon: Dewey Mann MD;  Location: [facility] OR [address] [place] [address];  Service: Urology;  Laterality: N/A;  45 min    CYSTOURETHROSCOPY WITH DIRECT VISION INTERNAL URETHROTOMY N/A 12/19/2018    Procedure: CYSTOSCOPY, WITH DIRECT VISION INTERNAL URETHROTOMY;  Surgeon: Dewey Mann MD;  Location: [facility] OR [address] [place] [address];  Service: Urology;  Laterality: N/A;    DILATION OF URETHRA N/A 12/19/2018    Procedure: DILATION, URETHRA;  Surgeon: Dewey Mann MD;  Location: [facility] OR [address] [place] [address];  Service:  Urology;  Laterality: N/A;    EXCISIONAL BIOPSY N/A 7/13/2020    Procedure: EXCISIONAL BIOPSY- LEFT INGUINAL NODE;  Surgeon: Vlad Epstein MD;  Location: [address];  Service: General;  Laterality: N/A;    FLEXIBLE CYSTOSCOPY N/A 11/6/2019    Procedure: CYSTOSCOPY, FLEXIBLE;  Surgeon: Dewey Mann MD;  Location: [address];  Service: Urology;  Laterality: N/A;    GASTROJEJUNOSTOMY      HEMORRHOID SURGERY      HERNIA REPAIR      INSERTION OF VENOUS ACCESS PORT Left 7/27/2020    Procedure: INSERTION, VENOUS ACCESS PORT;  Surgeon: Vlad Epstein MD;  Location: [facility] OR [address] [place] [address];  Service: General;  Laterality: Left;    KIDNEY TRANSPLANT      LEFT HEART CATHETERIZATION Left 8/20/2019    Procedure: Left heart cath;  Surgeon: Javan Oscar MD;  Location: [facility] CATH/EP LAB;  Service: Cardiology;  Laterality: Left;    LITHOTRIPSY      LYMPH NODE BIOPSY N/A 6/30/2020    Procedure: BIOPSY, LYMPH NODE;  Sur[facility]: [facility];  Location: [address];  Service: General;  Laterality: N/A;  189 lymph node biopsy /ULTRASOUND    PERCUTANEOUS NEPHROLITHOTRIPSY      right  ESWL  10/31/12    right ESWL  6/26/12    URETHROPLASTY USING PATCH GRAFT N/A 11/6/2019    Procedure: URETHROPLASTY, USING PATCH GRAFT BUCCAL MUCOSA GRAFT;  Surgeon: Dewey Mann MD;  Location: [address];  Service: Urology;  Laterality: N/A;  3 HOURS     Social History     Socioeconomic History    Marital status: Single     Spouse name: Not on file    Number of children: Not on file    Years of education: Not on file    Highest education level: Not on file   Occupational History     Employer: Disabled   Social Needs    Financial resource strain: Not on file    Food insecurity     Worry: Not on file     Inability: Not on file    Transportation needs     Medical: Not on file     Non-medical: Not on file   Tobacco Use    Smoking status: Former Smoker     Packs/day: 0.50     Years: 40.00      Pack years: 20.00     Types: Cigarettes     Quit date: 6/16/2010     Years since quitting: 10.3    Smokeless tobacco: Never Used   Substance and Sexual Activity    Alcohol use: Yes     Alcohol/week: 3.0 standard drinks     Types: 3 Cans of beer per week     Comment: occasional/social    Drug use: No     Comment: THC in youth    Sexual activity: Yes     Partners: Female     Birth control/protection: None   Lifestyle    Physical activity     Days per week: Not on file     Minutes per session: Not on file    Stress: Not on file   Relationships    Social connections     Talks on phone: Not on file     Gets together: Not on file     Attends Congregation service: Not on file     Active member of club or organization: Not on file     Attends meetings of clubs or organizations: Not on file     Relationship status: Not on file   Other Topics Concern    Not on file   Social History Narrative    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     Family History   Problem Relation Age of Onset    Diabetes Mother     Alzheimer's disease Mother     Alcohol abuse Father     HIV Brother     Stroke Maternal Aunt     Kidney disease Paternal Uncle     Kidney disease Cousin     No Known Problems Sister     No Known Problems Daughter     No Known Problems Sister     No Known Problems Brother     No Known Problems Brother     Cancer Brother         thyroid cancer    Colon cancer Brother     Melanoma Neg Hx     Psoriasis Neg Hx     Lupus Neg Hx     Eczema Neg Hx     Colon polyps Neg Hx     Crohn's disease Neg Hx     Ulcerative colitis Neg Hx     Celiac disease Neg Hx        Pertinent medications noted:     Review of Systems:     chills, fever, sweats, weight loss  No change in vision, loss of vision or diplopia  No sinus congestion, purulent nasal discharge, post nasal drip or facial pain  No pain in mouth or throat. No problems with teeth, gums.  No chest pain, palpitations, syncope  No cough, sputum  production, shortness of breath,    No nausea, vomiting,  , blood in stool, or focal abd pain, (HE has generalized abd discomfort), positive cramps whenever he takes po.  No dysphagia, odynophagia  No dysuria, hematuria, strangury, retention,    Reports his prostate has been checked adequately.   No swelling of joints, redness of joints, injuries, or new focal pain  No unusual headaches, dizziness, vertigo,  , neuropathy, falls  No anxiety, depression, substance abuse, sleep disturbance  No diabetes, thyroid, hypogonadal conditions  No bleeding,  unusual bruising  No new rashes, lesions, or wounds     Outdoor activities:none  Travel:   Implants:   Antibiotic History: cefpodoxime around each chem treatment because of recurren tUTIs.     EXAM & DIAGNOSTICS REVIEWED:   Vitals:     Temp:  [98.4 °F (36.9 °C)-103 °F (39.4 °C)]   Temp: (!) 102 °F (38.9 °C) (10/16/20 1615)  Pulse: 96 (10/16/20 1300)  Resp: (!) 27 (10/16/20 1300)  BP: 98/63 (10/16/20 1300)  SpO2: 98 % (10/16/20 1300)    Intake/Output Summary (Last 24 hours) at 10/16/2020 1647  Last data filed at 10/16/2020 1300  Gross per 24 hour   Intake 4277.8 ml   Output 1650 ml   Net 2627.8 ml       General:  In NAD. Alert and attentive, cooperative, uncomfortable, chronically ill appearing  Eyes:  Anicteric, PERRL, EOMI  ENT:  No ulcers, exudates, thrush, nares patent, dentition  good  Neck:  supple, no masses or adenopathy appreciated  Lungs: Clear, no consolidation, rales, wheezes, rub  Heart:  RRR, no gallop/murmur/rub noted  Abd:  Mild distention, hypoactive BS, no masses or organomegaly appreciated. Tender throughout  :  Voids/  urine clear, no flank tenderness  Musc:  Joints without effusion, swelling, erythema, synovitis,    Skin:  No rashes. No palmar or plantar lesions. No subungual petechiae  Wound:   Neuro:              Alert, attentive, speech fluent, face symmetric, moves all extremities, no focal weakness. Ambulatory  Psych:  Calm, cooperative      Extrem: No edema, erythema, phlebitis, cellulitis, warm and well perfused  VAD:  portacath     Isolation:  Special contact    Lines/Tubes/Drains:    General Labs reviewed:  Recent Labs   Lab 10/15/20  1520 10/16/20  0812   WBC 0.20* 0.11*   HGB 11.5* 9.6*   HCT 35.1* 29.7*   * 91*       Recent Labs   Lab 10/15/20  1520 10/16/20  0017 10/16/20  0812     --  137   K 2.6* 3.2* 3.8     --  111*   CO2 17*  --  19*   BUN 20  --  19   CREATININE 1.4  --  1.6*   CALCIUM 7.9*  --  7.3*   PROT 5.2*  --   --    BILITOT 0.7  --   --    ALKPHOS 65  --   --    ALT 14  --   --    AST 12  --   --            Micro:  Microbiology Results (last 7 days)     Procedure Component Value Units Date/Time    Clostridium difficile EIA [[id number]]  (Abnormal) Collected: 10/16/20 0358    Order Status: Completed Specimen: Stool Updated: 10/16/20 1057     C. diff Antigen Positive     C difficile Toxins A+B, EIA Positive     Comment: Testing not recommended for children <24 months old.       Blood culture x two cultures. Draw prior to antibiotics. [[id number]] Collected: 10/15/20 1546    Order Status: Completed Specimen: Blood from Antecubital, Right Arm Updated: 10/16/20 0745     Blood Culture, Routine No Growth to date    Narrative:      Aerobic and anaerobic    Blood culture x two cultures. Draw prior to antibiotics. [[id number]] Collected: 10/15/20 1626    Order Status: Completed Specimen: Blood from Peripheral, Right Hand Updated: 10/16/20 0745     Blood Culture, Routine No Growth to date    Narrative:      Aerobic and anaerobic        Imaging Reviewed:   CXR clear   CT abdomen and pelvis 10/16  Findings consistent with severe proctocolitis.     Transplanted kidney right side of the pelvis with mild pelvocaliectasis and perinephric stranding nonspecific.  No calcified stones seen     Additional findings as detailed above including cystic lesion with in the native right kidney versus dilatation of collecting structure of the  native right kidney.  Stones in the native right kidney.  Cystic lesion within the pancreas  Cardiology:    IMPRESSION & PLAN   1. Severe C. Difficile colitis   Prompted by oral antibiotics and/or chemotherapy   History of Cdiff 8/2019    2. PTLD, EBV related lymphoma, receiving chemotherapy and neutropenia  3. S/p renal transplant on immunosuppression  4. Chronic diarrhea      Recommendations:  Cefepime for gram negative coverage until neutropenia resolved  neupogen until ANC is well over 1000  IV flagyl and high dose oral vancomyin for Cdiff  Serial KUBs  If he worsens, would add IV tygacil  Anti-spasmodics    Medical Decision Making during this encounter was  [_] Low Complexity  [_] Moderate Complexity  [  ] High Complexity

## 2020-10-16 NOTE — TELEPHONE ENCOUNTER
Took consult from Lake RN in ICU at [facility] this morning--sent information to MATTHEW Goodman--no further action needed at this time

## 2020-10-16 NOTE — ASSESSMENT & PLAN NOTE
Unsure of source.  UA ordered; not collected yet.  Empiric antibiotcs: vancomycin and cefepime.  BP remains low, even after a bolus of saline at 30 ml/kg.  Continue norepinephrine drip.  Monitor lactate level.

## 2020-10-16 NOTE — RESPIRATORY THERAPY
10/16/20 0757   Patient Assessment/Suction   Level of Consciousness (AVPU) alert   PRE-TX-O2   O2 Device (Oxygen Therapy) room air   Pulse Oximetry Type Continuous   $ Pulse Oximetry - Multiple Charge Pulse Oximetry - Multiple

## 2020-10-16 NOTE — SUBJECTIVE & OBJECTIVE
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  Renal allograft biopsy 17 (DIVINE): 21 glomeruli, none globally sclerosed, <5% interstitial fibrosis, no ACR, c4d negative, AVR CCT Type 2 (V1 lesion); plan THYMO     Dissecting aortic aneurysm (any part), abdominal     Diverticulosis     Encounter for blood transfusion     ESRD (end stage renal disease) 2010    H/O urethral stricture 2018    H/O: urethral stricture     History of AAA (abdominal aortic aneurysm) repair     History of urethral stricture 2018    Hypertension     Hypokalemia     Hypothyroidism 1/10/2014    Inguinal hernia bilateral, non-recurrent     Kidney stones     Organ transplant candidate 2013    Plantar warts 1/10/2014    Recurrent UTI 2017    S/P kidney transplant     Secondary hyperparathyroidism, renal     Thyroid disease        Past Surgical History:   Procedure Laterality Date    ABDOMINAL SURGERY      exploratory lapatomy x 2    ABLATION N/A 2019    Procedure: ABLATION, SVT;  Surgeon: Emerson Mcmanus MD;  Location: [facility];  Service: Cardiology;  Laterality: N/A;  SVT, RFA, CARTO, anes, GP, 323    AORTA - SUPERIOR MESENTERIC ARTERY BYPASS GRAFT      BLADDER NECK RECONSTRUCTION      BLADDER SURGERY      COLONOSCOPY  10/10/2013    Dr. Gutierrez, repeat in 5 years    CYSTOSCOPY      CYSTOSCOPY N/A 2018    Procedure: CYSTOSCOPY;   Surgeon: Dewey Mann MD;  [address];  Service: Urology;  Laterality: N/A;  45 min    CYSTOURETHROSCOPY WITH DIRECT VISION INTERNAL URETHROTOMY N/A 12/19/2018    Procedure: CYSTOSCOPY, WITH DIRECT VISION INTERNAL URETHROTOMY;  Surgeon: Dewey Mann MD;  [address];  Service: Urology;  Laterality: N/A;    DILATION OF URETHRA N/A 12/19/2018    Procedure: DILATION, URETHRA;  Surgeon: Dewey Mann MD;  [address];  Service: Urology;  Laterality: N/A;    EXCISIONAL BIOPSY N/A 7/13/2020    Procedure: EXCISIONAL BIOPSY- LEFT INGUINAL NODE;  Surgeon: Vlad Epstein MD;  Location: [address];  Service: General;  Laterality: N/A;    FLEXIBLE CYSTOSCOPY N/A 11/6/2019    Procedure: CYSTOSCOPY, FLEXIBLE;  Surgeon: Dewey Mann MD;  Location: [address];  Service: Urology;  Laterality: N/A;    GASTROJEJUNOSTOMY      HEMORRHOID SURGERY      HERNIA REPAIR      INSERTION OF VENOUS ACCESS PORT Left 7/27/2020    Procedure: INSERTION, VENOUS ACCESS PORT;  Surgeon: Vlad Epstein MD;  Location: [address];  Service: General;  Laterality: Left;    KIDNEY TRANSPLANT      LEFT HEART CATHETERIZATION Left 8/20/2019    Procedure: Left heart cath;  Surgeon: Javan Oscar MD;  Location: [facility] CATH[facility]EP LAB;  Service: Cardiology;  Laterality: Left;    LITHOTRIPSY      LYMPH NODE BIOPSY N/A 6/30/2020    Procedure: BIOPSY, LYMPH NODE;  Surgeon: [facility];  Location: [address];  Service: General;  Laterality: N/A;  189 lymph node biopsy /ULTRASOUND    PERCUTANEOUS NEPHROLITHOTRIPSY      right  ESWL  10/31/12    right ESWL  6/26/12    URETHROPLASTY USING PATCH GRAFT N/A 11/6/2019    Procedure: URETHROPLASTY, USING PATCH GRAFT BUCCAL MUCOSA GRAFT;  Surgeon: Dewey Mann MD;  Location: [address];  Service: Urology;  Laterality: N/A;  3 HOURS       Review of patient's allergies indicates:  No Known Allergies    No current  facility-administered medications on file prior to encounter.      Current Outpatient Medications on File Prior to Encounter   Medication Sig    allopurinoL (ZYLOPRIM) 300 MG tablet Take 1 tablet (300 mg total) by mouth once daily.    aspirin (ECOTRIN) 81 MG EC tablet Take 1 tablet (81 mg total) by mouth once daily.    calcitRIOL (ROCALTROL) 0.5 MCG Cap Take 1 capsule (0.5 mcg total) by mouth once daily.    cefpodoxime (VANTIN) 100 MG tablet Take 1 tablet (100 mg total) by mouth every 12 (twelve) hours.    COMBIGAN 0.2-0.5 % Drop Place 1 drop into both eyes 2 (two) times a day.     famotidine (PEPCID) 20 MG tablet TAKE 1 TABLET EVERY EVENING (Patient taking differently: Take 20 mg by mouth every evening. )    ketoconazole (NIZORAL) 200 mg Tab TAKE ONE-HALF (1/2) TABLET ONCE DAILY (Patient taking differently: Take 100 mg by mouth once daily. )    levothyroxine (SYNTHROID) 100 MCG tablet TAKE 1 TABLET DAILY (Patient taking differently: Take 100 mcg by mouth before breakfast. Administer on an empty stomach at least 30 minutes before food. If receiving tube feeds, HOLD tube feeds for 1 hour before and after levothyroxine administration.)    magnesium oxide (MAG-OX) 400 mg (241.3 mg magnesium) tablet Take 1 tablet (400 mg total) by mouth once daily.    multivitamin (ONE DAILY MULTIVITAMIN) per tablet Take 1 tablet by mouth once daily.    ondansetron (ZOFRAN-ODT) 8 MG TbDL Dissolve 1 tablet (8 mg total) by mouth every 12 (twelve) hours as needed. (Patient taking differently: Take 8 mg by mouth every 12 (twelve) hours as needed (nausea). )    predniSONE (DELTASONE) 50 MG Tab Take 2 tablets (100 mg total) by mouth once daily. Take on days 2-5 of your chemotherapy cycles.    sodium bicarbonate 650 MG tablet Take 1 tablet (650 mg total) by mouth 2 (two) times daily.    tacrolimus (PROGRAF) 1 MG Cap Take 3 capsules (3 mg total) by mouth every morning AND 2 capsules (2 mg total) every evening. Z94.0/Kidney  Transplant on 11/26/16.    travoprost (TRAVATAN Z) 0.004 % ophthalmic solution Place 1 drop into both eyes every evening.     [DISCONTINUED] HYDROcodone-acetaminophen (NORCO) 5-325 mg per tablet Take 1 tablet by mouth every 6 (six) hours as needed for Pain.    [DISCONTINUED] LORazepam (ATIVAN) 0.5 MG tablet Take 1 tablet (0.5 mg total) by mouth once. 30 minutes before procedure for 1 dose (Patient not taking: Reported on 8/28/2020)    [DISCONTINUED] metoprolol tartrate (LOPRESSOR) 25 MG tablet TAKE ONE-HALF (1/2) TABLET TWICE A DAY    [DISCONTINUED] oxyCODONE (ROXICODONE) 5 MG immediate release tablet Take 1 tablet (5 mg total) by mouth every 8 (eight) hours as needed for Pain.    [DISCONTINUED] pep injection Inject 0.25 ml as directed.   May increase by 0.05 ml such as 0.30, 0.35 ml etc. Up to 1.00 ml Until adequate result is achieved.    For compounding pharmacy use:   Add PAPAVERINE 30 mcg  Add PHENTOLAMINE 10 mg  Add ALPROSTADIL 100 mcg    [DISCONTINUED] phenazopyridine (PYRIDIUM) 200 MG tablet Take 1 tablet (200 mg total) by mouth 3 (three) times daily as needed for Pain.    [DISCONTINUED] promethazine (PHENERGAN) 12.5 MG Tab Take 1 tablet (12.5 mg total) by mouth every 6 (six) hours as needed.    [DISCONTINUED] sulfamethoxazole-trimethoprim 800-160mg (BACTRIM DS) 800-160 mg Tab Take 1 tablet by mouth twice daily for 14 days     Family History     Problem Relation (Age of Onset)    Alcohol abuse Father    Alzheimer's disease Mother    Cancer Brother    Colon cancer Brother    Diabetes Mother    HIV Brother    Kidney disease Paternal Uncle, Cousin    No Known Problems Sister, Daughter, Sister, Brother, Brother    Stroke Maternal Aunt        Tobacco Use    Smoking status: Former Smoker     Packs/day: 0.50     Years: 40.00     Pack years: 20.00     Types: Cigarettes     Quit date: 6/16/2010     Years since quitting: 10.3    Smokeless tobacco: Never Used   Substance and Sexual Activity    Alcohol use:  Yes     Alcohol/week: 3.0 standard drinks     Types: 3 Cans of beer per week     Comment: occasional/social    Drug use: No     Comment: THC in youth    Sexual activity: Yes     Partners: Female     Birth control/protection: None     Review of Systems   Constitutional: Positive for activity change, appetite change, fatigue and fever. Negative for chills.   HENT: Negative for congestion and sinus pressure.    Eyes: Negative for pain and visual disturbance.   Respiratory: Negative for cough, shortness of breath and wheezing.    Cardiovascular: Negative for chest pain, palpitations and leg swelling.   Gastrointestinal: Positive for abdominal pain and diarrhea. Negative for nausea and vomiting.   Genitourinary: Positive for decreased urine volume. Negative for difficulty urinating, dysuria and hematuria.   Musculoskeletal: Negative for arthralgias and myalgias.   Skin: Negative for rash and wound.   Neurological: Negative for dizziness, weakness, light-headedness and headaches.   Hematological: Negative for adenopathy. Does not bruise/bleed easily.   Psychiatric/Behavioral: Negative for confusion and dysphoric mood. The patient is not nervous/anxious.      Objective:     Vital Signs (Most Recent):  Temp: 98.8 °F (37.1 °C) (10/15/20 2230)  Pulse: 102 (10/15/20 2300)  Resp: (!) 23 (10/15/20 2300)  BP: (!) 107/58 (10/15/20 2300)  SpO2: 100 % (10/15/20 2300) Vital Signs (24h Range):  Temp:  [98.8 °F (37.1 °C)-101.8 °F (38.8 °C)] 98.8 °F (37.1 °C)  Pulse:  [] 102  Resp:  [16-25] 23  SpO2:  [95 %-100 %] 100 %  BP: ()/() 107/58     Weight: 66.9 kg (147 lb 7.8 oz)  Body mass index is 20.57 kg/m².    Physical Exam  Constitutional:       General: He is not in acute distress.     Appearance: He is normal weight. He is ill-appearing.   HENT:      Head: Normocephalic and atraumatic.      Right Ear: External ear normal.      Left Ear: External ear normal.      Mouth/Throat:      Mouth: Mucous membranes are dry.       Pharynx: No oropharyngeal exudate.   Eyes:      General: No scleral icterus.        Right eye: No discharge.         Left eye: No discharge.      Conjunctiva/sclera: Conjunctivae normal.   Neck:      Musculoskeletal: Normal range of motion and neck supple.      Thyroid: No thyromegaly.      Vascular: No JVD.   Cardiovascular:      Rate and Rhythm: Regular rhythm. Tachycardia present.      Heart sounds: No gallop.    Pulmonary:      Effort: Pulmonary effort is normal.      Breath sounds: Normal breath sounds. No wheezing.   Abdominal:      General: Bowel sounds are normal. There is no distension.      Palpations: Abdomen is soft. There is no mass.      Tenderness: There is abdominal tenderness in the left lower quadrant. There is no left CVA tenderness or rebound.   Musculoskeletal:         General: No deformity.   Lymphadenopathy:      Cervical: No cervical adenopathy.   Skin:     General: Skin is warm and dry.      Capillary Refill: Capillary refill takes less than 2 seconds.      Findings: No rash.   Neurological:      Mental Status: He is alert and oriented to person, place, and time.   Psychiatric:         Behavior: Behavior normal.             Significant Labs:   BMP:   Recent Labs   Lab 10/15/20  1520         K 2.6*      CO2 17*   BUN 20   CREATININE 1.4   CALCIUM 7.9*   MG 2.3     CBC:   Recent Labs   Lab 10/15/20  1520   WBC 0.20*   HGB 11.5*   HCT 35.1*   *     Lactic Acid:   Recent Labs   Lab 10/15/20  1520 10/15/20  1855   LACTATE 1.3 2.0       Significant Imaging: I have reviewed all pertinent imaging results/findings within the past 24 hours.

## 2020-10-16 NOTE — CONSULTS
Addendum to previous vancomycin consult note:    Pharmacokinetic Initial Assessment: IV Vancomycin    Assessment/Plan:    Initiate intravenous vancomycin with dose of 1000 mg once with subsequent doses when random concentrations are less than 20 mcg/mL  Desired empiric serum trough concentration is 15 to 20 mcg/mL  Draw vancomycin random level on 10/16/2020 at 1900.  Pharmacy will continue to follow and monitor vancomycin.      Please contact pharmacy at extension [phone] with any questions regarding this assessment.     Thank you for the consult,   Naun Tanya       Patient brief summary:  Alin Burkett is a 70 y.o. male initiated on antimicrobial therapy with IV Vancomycin for treatment of suspected neutropenic fever, sepsis    Drug Allergies:   Review of patient's allergies indicates:  No Known Allergies    Actual Body Weight:   66.9kg    Renal Function:   Estimated Creatinine Clearance: 41.2 mL/min (A) (based on SCr of 1.6 mg/dL (H)).,     CBC (last 72 hours):  Recent Labs   Lab Result Units 10/15/20  1520 10/16/20  0812   WBC K/uL 0.20* 0.11*   Hemoglobin g/dL 11.5* 9.6*   Hematocrit % 35.1* 29.7*   Platelets K/uL 113* 91*   Gran% % 68.0  --    Lymph% % 32.0  --    Mono% % 0.0*  --    Eosinophil% % 0.0  --    Basophil% % 0.0  --    Differential Method  Manual  --        Metabolic Panel (last 72 hours):  Recent Labs   Lab Result Units 10/15/20  1520 10/16/20  0017 10/16/20  0350 10/16/20  0812   Sodium mmol/L 136  --   --  137   Potassium mmol/L 2.6* 3.2*  --  3.8   Chloride mmol/L 107  --   --  111*   CO2 mmol/L 17*  --   --  19*   Glucose mg/dL 108  --   --  96   Glucose, UA   --   --  Negative  --    BUN, Bld mg/dL 20  --   --  19   Creatinine mg/dL 1.4  --   --  1.6*   Albumin g/dL 2.4*  --   --   --    Total Bilirubin mg/dL 0.7  --   --   --    Alkaline Phosphatase U/L 65  --   --   --    AST U/L 12  --   --   --    ALT U/L 14  --   --   --    Magnesium mg/dL 2.3  --   --   --    Phosphorus mg/dL 2.0*  --    --   --        Drug levels (last 3 results):  No results for input(s): VANCOMYCINRA, VANCOMYCINPE, VANCOMYCINTR in the last 72 hours.    Microbiologic Results:  Microbiology Results (last 7 days)       Procedure Component Value Units Date/Time    Blood culture x two cultures. Draw prior to antibiotics. [[id number]] Collected: 10/15/20 1546    Order Status: Completed Specimen: Blood from Antecubital, Right Arm Updated: 10/16/20 0745     Blood Culture, Routine No Growth to date    Narrative:      Aerobic and anaerobic    Blood culture x two cultures. Draw prior to antibiotics. [[id number]] Collected: 10/15/20 1626    Order Status: Completed Specimen: Blood from Peripheral, Right Hand Updated: 10/16/20 0745     Blood Culture, Routine No Growth to date    Narrative:      Aerobic and anaerobic    Clostridium difficile EIA [[id number]] Collected: 10/16/20 0358    Order Status: Sent Specimen: Stool Updated: 10/16/20 0414

## 2020-10-16 NOTE — EICU
Eicu brief admission review note.  Pt was examined on video, notes, images, labs  reviewed.  Septic shock in neutropenic patient c  on chemo (CHOP regimen for B cell lymphoma Last chemo on 10/9) with abdominal pain, diarrhea, was on po vantin.   S/p septic bolus, continue LR@100cc/h Cefepime/Vanc, UA pending, CXR NAD, order CT abdomen noncontrast, stool for c.diff, PO vanco till C.diff is back, consider neupogen  S/p Kidney transplant off tac for several days, f/u lytes, creatinine, UO, tac level, resume tac  HypoK being supplemented, recheck  Trombocytopenia on chemo- moniotor  DVT proph- SCD for now  D/w RN

## 2020-10-16 NOTE — ED NOTES
Pt continues to be resting in bed with eyes closed and even chest rise and fall. Pt on CCM. VSS. Awaiting ICU nurse to arrive to unit to give report.

## 2020-10-16 NOTE — ASSESSMENT & PLAN NOTE
No evidence of specific infection.  Will treat empirically with cefepime and vancomycin.  Monitor leukocyte count.  Will consult with hematologist for help.

## 2020-10-16 NOTE — HPI
"Patient has been having diarrhea with "jelly-like" consistency as well as crampiness in the abdomen.  Symptoms began roughly one week ago.  Also has had fever and malaise.  Appetite poor.  Fatigue as well.  He has been receiving chemo for PTLD; most recent cycle of CHOP was end of last week.  He has history of renal transplant four years ago and is currently on twice-a-day  Prograf.  He's had no cough, no unusual headache, no sinus pain, and no burning on urination.  He feels that he's probably dehydrated.  "

## 2020-10-16 NOTE — ED NOTES
Pt continues resting in bed with eyes closed and even chest rise and fall. Wife remains at BS. Pt on CCM in NAD.

## 2020-10-16 NOTE — CONSULTS
[facility]  Patient Name: Alin Burkett  : 1950  Age: 70 y.o.  Sex: male  MRN: [id number]  Admission Date: 10/15/2020  Hospital Length of Stay: 1 days  Code Status: Full Code   Admitting Provider: Brennan Decker MD  Attending Provider: Brennan Decker MD  Primary Care Physician: Carmen Krueger MD  Full Code  Subjective:     Date of Visit: 10/16/2020             Principal Problem: Septic shock    Reason for Consult: Febrile Neutropenia    Patient ID: Alin Burkett is a 70 y.o. 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 ED workup showed pt was neutropenic with WBC 0.2 with fever. Pt was cultured and given fluid resuscitation and vancomycin and cefepime. Stool cultures positive for C. Difficile toxin. CT abd/pelvis without contrast revealed severe proctocolitis without megacolon. Pt remains pancytopenic with most recent WBC 0.11 with ANC 0.0 and H/H 9.6/29.7 and platelets 91. Pt seen at bedside with his wife and states he has chronic throbbing abdominal pain made worse by eating and has had chronic diarrhea. He states he is fatigued and has decreased appetite along with fever/chills and night sweats.    Review of Systems   Constitutional: Positive for activity change, appetite change, chills, diaphoresis (night sweats), fatigue and fever.   HENT: Negative for mouth sores and trouble swallowing.    Eyes: Negative for photophobia and visual disturbance.   Respiratory: Negative for cough, chest tightness, shortness of breath, wheezing and stridor.    Cardiovascular: Negative for chest pain and leg swelling.   Gastrointestinal: Positive for abdominal distention, abdominal pain and diarrhea. Negative for constipation, nausea and vomiting.   Musculoskeletal: Negative for  arthralgias, back pain and myalgias.   Skin: Negative for color change, pallor, rash and wound.   Neurological: Negative for syncope, speech difficulty and weakness.   Hematological: Negative for adenopathy. Does not bruise/bleed easily.   Psychiatric/Behavioral: Negative for agitation, behavioral problems, confusion, decreased concentration and dysphoric mood.        ONCOLOGY HISTORY:     1. Monomorphic post-transplant lymphoproliferative disorder              A. 2020: Noticed left inguinal lymphadenopathy after a dog bite (failed to resolve with antibiotics)              B. 2020: Saw Dr. Collins in infectious diseases for inguinal lymphadenopathy - referred for biopsy              C. 2020: Core biopsy of L inguinal lymph node shows B-cell lymphoma of germinal center origin; EBV-positive by LONNIE; morphology nondiagnostic of PTLD vs follicular lymphoma              D. 2020: PET/CT shows left internal iliac chain node measuring 3.3 x 3 cm with SUV max 37; L inguinal node measures 3.2 x 2.4 cm with SUV max 36              E. 2020: Excisional biopsy of left inguinal node shows monomorphic post-transplant lymphoproliferative disorder (DLBCL, GCB 60%, follicular lymphoma, grade 3B 40%); FISH for MYC rearrangement is negative              F. 2020: Bone marrow biopsy shows no evidence of B-cell lymphoma                Past Medical History:   Diagnosis Date    Acidosis     Adrenal adenoma     Anemia associated with chronic renal failure     Arrhythmia, onset 2015    Awaiting organ transplant status 2013    Basal cell carcinoma 2012    left nasal tip    Blood type B+ 2013    Calcium nephrolithiasis 10/16/2012    Cancer     Celiac artery dissection     Chronic diarrhea     Chronic urethral stricture     Congenital absence of kidney     left    -donor kidney transplant 16     Induced w Campath 30 mg IV intraoperatively & SoluMedrol 875 mg total over  3 days.  Renal allograft biopsy 1/6/17 (DIVINE): 21 glomeruli, none globally sclerosed, <5% interstitial fibrosis, no ACR, c4d negative, AVR CCT Type 2 (V1 lesion); plan THYMO     Dissecting aortic aneurysm (any part), abdominal     Diverticulosis     Encounter for blood transfusion     ESRD (end stage renal disease) 06/16/2010    H/O urethral stricture 11/27/2018    H/O: urethral stricture     History of AAA (abdominal aortic aneurysm) repair     History of urethral stricture 12/19/2018    Hypertension     Hypokalemia     Hypothyroidism 1/10/2014    Inguinal hernia bilateral, non-recurrent     Kidney stones     Organ transplant candidate 11/26/2013    Plantar warts 1/10/2014    Recurrent UTI 7/28/2017    S/P kidney transplant     Secondary hyperparathyroidism, renal     Thyroid disease        Family History   Problem Relation Age of Onset    Diabetes Mother     Alzheimer's disease Mother     Alcohol abuse Father     HIV Brother     Stroke Maternal Aunt     Kidney disease Paternal Uncle     Kidney disease Cousin     No Known Problems Sister     No Known Problems Daughter     No Known Problems Sister     No Known Problems Brother     No Known Problems Brother     Cancer Brother         thyroid cancer    Colon cancer Brother     Melanoma Neg Hx     Psoriasis Neg Hx     Lupus Neg Hx     Eczema Neg Hx     Colon polyps Neg Hx     Crohn's disease Neg Hx     Ulcerative colitis Neg Hx     Celiac disease Neg Hx        Past Surgical History:   Procedure Laterality Date    ABDOMINAL SURGERY      exploratory lapatomy x 2    ABLATION N/A 8/22/2019    Procedure: ABLATION, SVT;  Surgeon: Emerson Mcmanus MD;  Location: [facility];  Service: Cardiology;  Laterality: N/A;  SVT, RFA, CARTO, anes, GP, 323    AORTA - SUPERIOR MESENTERIC ARTERY BYPASS GRAFT      BLADDER NECK RECONSTRUCTION      BLADDER SURGERY      COLONOSCOPY  10/10/2013    Dr. Gutierrez, repeat in 5 years    CYSTOSCOPY       CYSTOSCOPY N/A 12/19/2018    Procedure: CYSTOSCOPY;  Surgeon: Dewey Mann MD;  Location: [facility] OR 1ST FLR;  Service: Urology;  Laterality: N/A;  45 min    CYSTOURETHROSCOPY WITH DIRECT VISION INTERNAL URETHROTOMY N/A 12/19/2018    Procedure: CYSTOSCOPY, WITH DIRECT VISION INTERNAL URETHROTOMY;  Surgeon: Dewey Mann MD;  [facility];  Service: Urology;  Laterality: N/A;    DILATION OF URETHRA N/A 12/19/2018    Procedure: DILATION, URETHRA;  Surgeon: Dewey Mann MD;  [facility];  Service: Urology;  Laterality: N/A;    EXCISIONAL BIOPSY N/A 7/13/2020    Procedure: EXCISIONAL BIOPSY- LEFT INGUINAL NODE;  Surgeon: Vlad Epstein MD;  Location: [facility] OR [address] [place] [address];  Service: General;  Laterality: N/A;    FLEXIBLE CYSTOSCOPY N/A 11/6/2019    Procedure: CYSTOSCOPY, FLEXIBLE;  Surgeon: Dewey Mann MD;  Location: [address];  Service: Urology;  Laterality: N/A;    GASTROJEJUNOSTOMY      HEMORRHOID SURGERY      HERNIA REPAIR      INSERTION OF VENOUS ACCESS PORT Left 7/27/2020    Procedure: INSERTION, VENOUS ACCESS PORT;  Surgeon: Vlad Epstein MD;  Location: [address];  Service: General;  Laterality: Left;    KIDNEY TRANSPLANT      LEFT HEART CATHETERIZATION Left 8/20/2019    Procedure: Left heart cath;  Surgeon: Javan Oscar MD;  Location: [facility] CATH/EP LAB;  Service: Cardiology;  Laterality: Left;    LITHOTRIPSY      LYMPH NODE BIOPSY N/A 6/30/2020    Procedure: BIOPSY, LYMPH NODE;  Surgeon: [facility] Diagnostic Provider;  [address];  Service: General;  Laterality: N/A;  189 lymph node biopsy /ULTRASOUND    PERCUTANEOUS NEPHROLITHOTRIPSY      right  ESWL  10/31/12    right ESWL  6/26/12    URETHROPLASTY USING PATCH GRAFT N/A 11/6/2019    Procedure: URETHROPLASTY, USING PATCH GRAFT BUCCAL MUCOSA GRAFT;  Surgeon: Dewey Mann MD;  Location: [address];  Service: Urology;  Laterality: N/A;  3 HOURS       Social History      Socioeconomic History    Marital status: Single     Spouse name: Not on file    Number of children: Not on file    Years of education: Not on file    Highest education level: Not on file   Occupational History     Employer: Disabled   Social Needs    Financial resource strain: Not on file    Food insecurity     Worry: Not on file     Inability: Not on file    Transportation needs     Medical: Not on file     Non-medical: Not on file   Tobacco Use    Smoking status: Former Smoker     Packs/day: 0.50     Years: 40.00     Pack years: 20.00     Types: Cigarettes     Quit date: 6/16/2010     Years since quitting: 10.3    Smokeless tobacco: Never Used   Substance and Sexual Activity    Alcohol use: Yes     Alcohol/week: 3.0 standard drinks     Types: 3 Cans of beer per week     Comment: occasional/social    Drug use: No     Comment: THC in youth    Sexual activity: Yes     Partners: Female     Birth control/protection: None   Lifestyle    Physical activity     Days per week: Not on file     Minutes per session: Not on file    Stress: Not on file   Relationships    Social connections     Talks on phone: Not on file     Gets together: Not on file     Attends Mandaeism service: Not on file     Active member of club or organization: Not on file     Attends meetings of clubs or organizations: Not on file     Relationship status: Not on file   Other Topics Concern    Not on file   Social History Narrative    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       Current Facility-Administered Medications   Medication Dose Route Frequency Provider Last Rate Last Dose    acetaminophen tablet 650 mg  650 mg Oral Q4H PRN Brennan Decker MD   650 mg at 10/16/20 1259    allopurinoL tablet 300 mg  300 mg Oral Daily Brennan Decker MD   300 mg at 10/16/20 1056    aspirin EC tablet 81 mg  81 mg Oral Daily Brennan Decker MD   81 mg at 10/16/20 1057    brimonidine-timoloL 0.2-0.5 % ophthalmic solution 1 drop   1 drop Both Eyes Q12H Brennan Decker MD   1 drop at 10/16/20 1043    calcitRIOL capsule 0.5 mcg  0.5 mcg Oral Daily Brennan Decker MD   0.5 mcg at 10/16/20 1055    cefepime in dextrose 5 % IVPB 2 g  2 g Intravenous Q12H Brennan Decker MD   2 g at 10/16/20 0337    dextrose 5 % and 0.45 % NaCl infusion   Intravenous Continuous Brennan Decker MD        famotidine tablet 20 mg  20 mg Oral QHS Brennan Decker MD   20 mg at 10/15/20 2237    levothyroxine tablet 100 mcg  100 mcg Oral Before breakfast Brennan Dekcer MD   100 mcg at 10/16/20 0549    magnesium sulfate 2g in water 50mL IVPB (premix)  2 g Intravenous PRN Brennan Decker MD        magnesium sulfate 2g in water 50mL IVPB (premix)  4 g Intravenous PRN Brennan Decker MD        metoclopramide HCl injection 10 mg  10 mg Intravenous Q6H PRN Brennan Decker MD        NORepinephrine bitartrate 8 mg in dextrose 5% 250 mL infusion  0.02 mcg/kg/min Intravenous Continuous Brennan Decker MD 25.5 mL/hr at 10/16/20 1041 0.2 mcg/kg/min at 10/16/20 1041    ondansetron disintegrating tablet 8 mg  8 mg Oral Q6H PRN Brennan Decker MD        oxyCODONE immediate release tablet 5 mg  5 mg Oral Q8H PRN Brennan Decker MD   5 mg at 10/15/20 2116    potassium chloride 10 mEq in 100 mL IVPB  40 mEq Intravenous PRN Brennan Decker  mL/hr at 10/16/20 0933 40 mEq at 10/16/20 0933    And    potassium chloride 10 mEq in 100 mL IVPB  60 mEq Intravenous PRN Brennan Decker MD        And    potassium chloride 10 mEq in 100 mL IVPB  80 mEq Intravenous PRN Brennan Decker MD        promethazine tablet 25 mg  25 mg Oral Q6H PRN Brennan Decker MD        sodium chloride 0.9% flush 10 mL  10 mL Intravenous PRN Brennan Decker MD        tacrolimus capsule 2 mg  2 mg Oral Daily PM Brennan Decker MD        tacrolimus capsule 3 mg  3 mg Oral Daily AM Brennan Decker MD   3 mg at 10/16/20 1059    travoprost 0.004 % ophthalmic solution  1 drop  1 drop Both Eyes QHS Brennan Decker MD   1 drop at 10/15/20 2332    vancomycin - pharmacy to dose   Intravenous pharmacy to manage frequency Brennan Decker MD        vancomycin 25 mg/mL oral solution 125 mg  125 mg Oral Q6H Fabienne Bustamante MD   125 mg at 10/16/20 1112        allopurinoL  300 mg Oral Daily    aspirin  81 mg Oral Daily    brimonidine-timoloL  1 drop Both Eyes Q12H    calcitRIOL  0.5 mcg Oral Daily    ceFEPime (MAXIPIME) IVPB  2 g Intravenous Q12H    famotidine  20 mg Oral QHS    levothyroxine  100 mcg Oral Before breakfast    tacrolimus  2 mg Oral Daily PM    tacrolimus  3 mg Oral Daily AM    travoprost  1 drop Both Eyes QHS    vancomycin  125 mg Oral Q6H        dextrose 5 % and 0.45 % NaCl      norepinephrine bitartrate-D5W 0.2 mcg/kg/min (10/16/20 1041)       acetaminophen, magnesium sulfate IVPB, magnesium sulfate IVPB, metoclopramide HCl, ondansetron, oxyCODONE, potassium chloride in water **AND** potassium chloride in water **AND** potassium chloride in water, promethazine, sodium chloride 0.9%, Pharmacy to dose Vancomycin consult **AND** vancomycin - pharmacy to dose    Antibiotics (From admission, onward)    Start     Stop Route Frequency Ordered    10/16/20 0600  vancomycin 25 mg/mL oral solution 125 mg  (C. difficile Infection (CDI) Treatment Order Panel)      10/26 0559 Oral Every 6 hours 10/15/20 2350    10/16/20 0400  cefepime in dextrose 5 % IVPB 2 g  (Med - Cefepime IVPB (Preferred if non-anaphylactic penicillin allergy))      -- IV Every 12 hours (non-standard times) 10/15/20 2220    10/15/20 2220  vancomycin - pharmacy to dose  (vancomycin IVPB)      -- IV pharmacy to manage frequency 10/15/20 2220          Review of patient's allergies indicates:  No Known Allergies  All medications and past history have been reviewed.    Objective:      Vitals:  Patient Vitals for the past 24 hrs:   BP Temp Temp src Pulse Resp SpO2 Height Weight   10/16/20 1300 98/63  -- -- 96 (!) 27 98 % -- --   10/16/20 1259 -- (!) 103 °F (39.4 °C) Oral -- -- -- -- --   10/16/20 1245 (!) 94/59 -- -- 99 (!) 28 99 % -- --   10/16/20 1230 112/74 -- -- 88 (!) 25 98 % -- --   10/16/20 1215 108/66 -- -- 93 (!) 25 98 % -- --   10/16/20 1200 105/63 -- -- 93 (!) 24 98 % -- --   10/16/20 1145 100/60 -- -- 93 (!) 24 98 % -- --   10/16/20 1130 (!) 104/58 -- -- 91 (!) 25 98 % -- --   10/16/20 1115 105/61 -- -- 95 (!) 25 99 % -- --   10/16/20 1100 115/69 -- -- 96 (!) 26 100 % -- --   10/16/20 1045 108/65 -- -- 95 (!) 26 100 % -- --   10/16/20 1030 116/72 -- -- 96 (!) 24 97 % -- --   10/16/20 1015 111/68 -- -- 96 (!) 24 98 % -- --   10/16/20 1000 106/67 (!) 101.1 °F (38.4 °C) Oral 95 (!) 24 100 % -- --   10/16/20 0945 100/64 -- -- 97 (!) 24 99 % -- --   10/16/20 0930 104/63 -- -- 95 (!) 23 99 % -- --   10/16/20 0915 104/64 -- -- 96 (!) 22 98 % -- --   10/16/20 0900 107/65 -- -- 95 (!) 21 100 % -- --   10/16/20 0845 106/60 -- -- 97 (!) 23 98 % -- --   10/16/20 0830 -- -- -- 94 (!) 27 100 % -- --   10/16/20 0815 -- -- -- 93 (!) 28 99 % -- --   10/16/20 0800 117/65 -- -- 91 (!) 22 98 % -- --   10/16/20 0745 119/73 (!) 101.3 °F (38.5 °C) Oral 89 (!) 22 99 % -- --   10/16/20 0730 112/74 -- -- 89 (!) 22 98 % -- --   10/16/20 0545 (!) 87/61 98.4 °F (36.9 °C) Axillary 84 (!) 23 100 % -- --   10/16/20 0515 (!) 94/53 -- -- 95 20 98 % -- --   10/16/20 0500 109/64 -- -- 88 16 98 % -- --   10/16/20 0445 110/74 -- -- 91 18 98 % -- --   10/16/20 0430 111/74 -- -- 94 (!) 32 98 % -- --   10/16/20 0415 109/68 -- -- 92 (!) 22 99 % -- --   10/16/20 0400 113/68 -- -- 93 (!) 24 97 % -- --   10/16/20 0345 101/66 -- -- 93 (!) 25 97 % -- --   10/16/20 0332 -- (!) 100.8 °F (38.2 °C) -- -- -- -- -- --   10/16/20 0330 (!) 82/53 -- -- 97 (!) 25 99 % -- --   10/16/20 0315 99/65 (!) 100.8 °F (38.2 °C) Axillary 98 (!) 22 98 % -- --   10/16/20 0300 (!) 90/55 -- -- 94 (!) 23 98 % -- --   10/16/20 0230 95/60 -- -- 95 (!) 22 -- -- --   10/16/20  "0215 (!) 97/58 -- -- 89 (!) 23 97 % -- --   10/16/20 0200 96/60 -- -- 96 (!) 30 97 % -- --   10/16/20 0145 97/60 -- -- 93 20 97 % -- --   10/16/20 0130 116/66 -- -- 87 (!) 21 96 % -- --   10/16/20 0128 -- -- -- -- -- 96 % -- --   10/16/20 0115 (!) 85/51 -- -- 101 (!) 24 96 % -- --   10/16/20 0100 102/61 -- -- 91 (!) 23 97 % -- --   10/16/20 0045 98/61 -- -- 93 (!) 23 95 % -- --   10/16/20 0030 (!) 101/59 -- -- 100 (!) 26 98 % -- --   10/16/20 0015 (!) 94/55 -- -- 100 (!) 25 96 % -- --   10/16/20 0000 (!) 93/53 98.8 °F (37.1 °C) Axillary 99 (!) 26 97 % -- --   10/15/20 2345 106/65 -- -- -- (!) 26 97 % -- --   10/15/20 2330 107/62 -- -- 99 (!) 26 95 % -- --   10/15/20 2315 (!) 102/56 -- -- 99 (!) 21 99 % -- --   10/15/20 2300 (!) 107/58 -- -- 102 (!) 23 100 % -- --   10/15/20 2245 97/68 -- -- 107 (!) 21 99 % -- --   10/15/20 2230 105/62 98.8 °F (37.1 °C) Axillary 103 (!) 25 100 % 5' 11" (1.803 m) 66.9 kg (147 lb 7.8 oz)   10/15/20 2215 109/66 -- -- 109 (!) 25 98 % -- --   10/15/20 2200 -- (!) 101.5 °F (38.6 °C) -- -- -- -- -- --   10/15/20 2157 (!) 117/58 -- -- -- -- -- -- --   10/15/20 2152 122/61 -- -- 103 -- 98 % -- --   10/15/20 2147 129/60 -- -- -- -- -- -- --   10/15/20 2142 (!) 126/56 -- -- -- -- -- -- --   10/15/20 2137 132/75 -- -- -- -- -- -- --   10/15/20 2131 137/81 -- -- -- -- -- -- --   10/15/20 2126 137/75 -- -- -- -- -- -- --   10/15/20 2121 128/74 -- -- -- -- -- -- --   10/15/20 2116 129/71 -- -- -- 20 -- -- --   10/15/20 2111 120/63 -- -- -- -- -- -- --   10/15/20 2106 132/64 -- -- -- -- -- -- --   10/15/20 2102 133/65 -- -- -- -- -- -- --   10/15/20 2057 132/65 -- -- -- -- -- -- --   10/15/20 2052 127/70 -- -- -- -- -- -- --   10/15/20 2047 123/60 -- -- -- -- -- -- --   10/15/20 2042 117/63 -- -- -- -- -- -- --   10/15/20 2037 115/62 -- -- 107 -- 99 % -- --   10/15/20 2031 (!) 114/57 -- -- -- -- -- -- --   10/15/20 2029 125/62 -- -- -- -- -- -- --   10/15/20 2021 117/75 -- -- -- -- -- -- -- " "  10/15/20 2016 119/75 -- -- -- -- -- -- --   10/15/20 2011 115/72 -- -- -- -- -- -- --   10/15/20 2006 121/69 -- -- (!) 119 -- 96 % -- --   10/15/20 2002 (!) 176/84 -- -- 80 -- 98 % -- --   10/15/20 1952 (!) 177/101 -- -- (!) 54 -- 100 % -- --   10/15/20 1942 (!) 98/59 -- -- 110 -- 98 % -- --   10/15/20 1937 (!) 93/58 -- -- 105 -- 98 % -- --   10/15/20 1931 (!) 107/59 -- -- -- -- -- -- --   10/15/20 1926 (!) 90/53 -- -- -- -- -- -- --   10/15/20 1921 (!) 85/49 -- -- (!) 111 -- 97 % -- --   10/15/20 1916 (!) 83/51 -- -- 107 -- 98 % -- --   10/15/20 1906 (!) 82/65 -- -- (!) 112 -- 99 % -- --   10/15/20 1902 (!) 83/51 -- -- (!) 118 -- 97 % -- --   10/15/20 1858 (!) 87/50 -- -- 110 -- 98 % -- --   10/15/20 1850 (!) 83/55 -- -- 108 -- 98 % -- --   10/15/20 1822 (!) 87/55 -- -- (!) 112 -- 100 % -- --   10/15/20 1821 (!) 87/55 (!) 100.5 °F (38.1 °C) Oral (!) 111 20 100 % -- --   10/15/20 1802 (!) 87/62 -- -- (!) 120 -- 100 % -- --   10/15/20 1752 97/63 -- -- (!) 116 -- 100 % -- --   10/15/20 1750 97/63 (!) 101.8 °F (38.8 °C) Oral (!) 114 (!) 22 100 % -- --   10/15/20 1702 98/63 -- -- -- -- -- -- --   10/15/20 1700 -- -- -- -- -- 95 % -- --   10/15/20 1611 (!) 92/56 -- -- (!) 117 -- 97 % -- --   10/15/20 1608 102/68 -- -- 102 16 98 % -- --   10/15/20 1550 102/64 -- -- -- -- -- -- --   10/15/20 1530 (!) 84/58 -- -- -- -- -- -- --   10/15/20 1518 (!) 82/58 -- -- -- -- -- -- --   10/15/20 1440 (!) 71/51 100 °F (37.8 °C) Oral (!) 120 18 95 % 5' 11" (1.803 m) 68 kg (150 lb)      Body mass index is 20.57 kg/m².  Body surface area is 1.83 meters squared.    Last 24 Hours:    Intake/Output Summary (Last 24 hours) at 10/16/2020 1344  Last data filed at 10/16/2020 1300  Gross per 24 hour   Intake 4277.8 ml   Output 1650 ml   Net 2627.8 ml     Weight Readings:  Wt Readings from Last 5 Encounters:   10/15/20 66.9 kg (147 lb 7.8 oz)   10/10/20 68.5 kg (151 lb)   10/09/20 66.4 kg (146 lb 6.2 oz)   10/02/20 65.8 kg (145 lb)   09/11/20 " 68.6 kg (151 lb 2 oz)      Blood Type:  B POS     Physical Exam  Constitutional:       Appearance: He is ill-appearing.   HENT:      Head: Normocephalic and atraumatic.      Right Ear: External ear normal.      Left Ear: External ear normal.      Nose: Nose normal. No congestion or rhinorrhea.   Eyes:      General:         Right eye: No discharge.         Left eye: No discharge.      Extraocular Movements: Extraocular movements intact.      Conjunctiva/sclera: Conjunctivae normal.      Pupils: Pupils are equal, round, and reactive to light.   Neck:      Musculoskeletal: Normal range of motion and neck supple. No neck rigidity.   Cardiovascular:      Rate and Rhythm: Normal rate and regular rhythm.      Heart sounds: Normal heart sounds. No murmur.   Pulmonary:      Effort: Pulmonary effort is normal. No respiratory distress.      Breath sounds: Normal breath sounds. No stridor. No wheezing, rhonchi or rales.   Abdominal:      General: Bowel sounds are normal. There is distension.      Palpations: Abdomen is soft.      Tenderness: There is abdominal tenderness (LLQ). There is no guarding.   Musculoskeletal: Normal range of motion.         General: No deformity.      Right lower leg: No edema.      Left lower leg: No edema.   Lymphadenopathy:      Cervical: No cervical adenopathy.   Skin:     General: Skin is warm and dry.      Coloration: Skin is not pale.      Findings: No erythema or rash.   Neurological:      General: No focal deficit present.      Mental Status: He is alert and oriented to person, place, and time. Mental status is at baseline.      Cranial Nerves: No cranial nerve deficit.   Psychiatric:         Mood and Affect: Mood normal.         Behavior: Behavior normal.         Thought Content: Thought content normal.         Judgment: Judgment normal.         Labs:  Recent Labs   Lab 10/15/20  1520 10/16/20  0812   WBC 0.20* 0.11*   RBC 3.66* 3.03*   HGB 11.5* 9.6*   HCT 35.1* 29.7*   * 91*   MCV 96  98     Recent Labs   Lab 10/15/20  1520 10/16/20  0017 10/16/20  0812     --  137   K 2.6* 3.2* 3.8     --  111*   CO2 17*  --  19*   BUN 20  --  19   CREATININE 1.4  --  1.6*     --  96   CALCIUM 7.9*  --  7.3*   MG 2.3  --   --    PHOS 2.0*  --   --    ALKPHOS 65  --   --    PROT 5.2*  --   --    ALBUMIN 2.4*  --   --    BILITOT 0.7  --   --    AST 12  --   --    ALT 14  --   --        Imaging:  Results for orders placed or performed during the hospital encounter of 10/15/20 (from the past 2160 hour(s))   CT Abdomen Pelvis  Without Contrast    Impression    Findings consistent with severe proctocolitis.    Transplanted kidney right side of the pelvis with mild pelvocaliectasis and perinephric stranding nonspecific.  No calcified stones seen    Additional findings as detailed above including cystic lesion with in the native right kidney versus dilatation of collecting structure of the native right kidney.  Stones in the native right kidney.  Cystic lesion within the pancreas.    Final read    Virtual Radiology concordant      Electronically signed by: Ada Castillo MD  Date:    10/16/2020  Time:    08:56   Results for orders placed or performed during the hospital encounter of 08/06/20 (from the past 2160 hour(s))   CT Chest Abdomen Pelvis Without Contrast (XPD)    Impression    No acute abdominal pathology identified.    Nonvisualization of the left kidney with malrotation of the right kidney and multiple nonobstructing renal stones.  No hydronephrosis.    Right pelvic renal allograft with no evidence for renal allograft focal lesion, nephrolithiasis, or hydronephrosis.    Stable mild ectasia of the infrarenal abdominal aorta measuring up to 2.6 cm without evidence for aortic aneurysm or other acute aortic pathology.  Atherosclerosis identified.    Multiple enlarged lymph nodes in the left inguinal region, with interval dissection of multiple left inguinal and pelvic lymph nodes when compared  to nuclear medicine PET-CT 07/08/2020.  Correlate with biopsy results.    Stable cystic appearing lesion in the pancreatic head measuring 1.2 cm.    Other findings as above.    Electronically signed by resident: Mati Cárdenas  Date:    08/06/2020  Time:    09:23    Electronically signed by: Jayson Staples MD  Date:    08/06/2020  Time:    10:34     *Note: Due to a large number of results and/or encounters for the requested time period, some results have not been displayed. A complete set of results can be found in Results Review.     PET CT 07/08/2020  FINDINGS:  Quality of the study: Adequate.     In the neck, there is no abnormal hypermetabolic activity worrisome for malignancy.  Vascular stent identified in the left axillary region.  No significant lymphadenopathy.     In the chest, there is no abnormal hypermetabolic activity worsened malignancy.  Coronary atherosclerosis.  0.4 cm pulmonary nodule identified in the left upper lobe (axial series 3, image 67), lesion is too small to characterize with PET-CT. Recommend attenuation expected follow-up examinations. No significant lymphadenopathy.     In the abdomen and pelvis, there is hypermetabolic lymphadenopathy throughout the left internal/external iliac chain and left groin.  New left internal iliac chain node measures 3.3 x 3 0 cm with SUV max of 37 (axial fused image 193).  Left inguinal node measures approximately 3.2 x 2.4 cm with SUV max of 36 (axial fused image 218), previously measured up to 1.4 cm.  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  The right kidney appears atrophic with abnormal contour parenchymal calcifications, unchanged.  Right lower quadrant transplant kidney in place.  Stable small bladder diverticulum.  Stable 1.2 cm pancreatic cyst, better characterized on most recent CT with IV contrast.  Stable bilateral probable adrenal adenomas.  Colonic diverticulosis without evidence of acute diverticulitis.  Stable fusiform abdominal aortic  ectasia.     Spleen appears upper limit of normal for size measuring 12.0 cm in craniocaudal dimension.  Normal heterogeneous uptake similar to liver.     In the bones, there is no abnormal activity worrisome for malignancy.  Additional focus of increased uptake identified within the myofascial structures of the thigh, just deep to the left femur with SUV max of 27 (axial fused image 269).     Impression:     Hypermetabolic lymphadenopathy throughout the left iliac chain and left groin with additional hypermetabolic focus in the left thigh.  No hypermetabolic juan david disease above the diaphragm.  The Deauville score is 5.    All lab results and imaging results have been reviewed.    Assessment and Plan:      Present on Admission:   Febrile neutropenia   Septic shock   Anemia in stage 3 chronic kidney disease   Acquired hypothyroidism   Post-transplant lymphoproliferative disorder (PTLD)   Stage 3 chronic kidney disease   Hypokalemia due to excessive gastrointestinal loss of potassium    Post-transplant lymphoproliferative disorder (PTLD)  -Cycle 4 RCHOP completed on 10/09/2020. Pt reaching corina time-frame s/p RCHOP infusion.    C-difficile colitis  -Secondary to abx use and chemotherapy.  -Follow ID recommendations.     Pancytopenia with neutropenic fever  -Secondary to chemotherapy and disease.  -Continue neutropenic precautions.  -Granix 300mcg ordered. Continue until ANC reaches 1,000.  -Mixed anemia secondary to chemotherapy and chronic disease. H/H 9.6/29.67. Will assess iron and tibc, ferritin, vitamin B12, folate to r/o any additional causes of anemia. Patient has no active signs of blood loss and is reaching corina of RCHOP treatment. Continue to monitor and transfuse PRBC PRN for H/H <7/21 or if pt is symptomatically anemic/hemodynamically unstable.  -Platelets are 91. Continue to monitor. Transfuse platelets PRN if platelet count <20,000 or if pt actively bleeding.  -Continue IV abx and neutropenic  precautions.    Sincerely,  Alexi Camp PA-C    Note is available for collaborating MD; Dr. Cathy Stafford for review.    Electronically signed by: Alexi Camp PA-C

## 2020-10-16 NOTE — PROGRESS NOTES
Pharmacokinetic Initial Assessment: IV Vancomycin    Assessment/Plan:    Initiate intravenous vancomycin with  dose of 1000 mg once followed by a maintenance dose of vancomycin 1000mg IV every 24 hours  Desired empiric serum trough concentration is 15 to 20 mcg/mL  Draw vancomycin trough level 60 min prior to third dose on 10/18 at approximately 0000  Pharmacy will continue to follow and monitor vancomycin.      Please contact pharmacy at extension [phone] with any questions regarding this assessment.     Thank you for the consult,   Catherine Yao       Patient brief summary:  Alin Burkett is a 70 y.o. male initiated on antimicrobial therapy with IV Vancomycin for treatment of suspected neutropenic fever    Drug Allergies:   Review of patient's allergies indicates:  No Known Allergies    Actual Body Weight:   66.9 kg    Renal Function:   Estimated Creatinine Clearance: 47.1 mL/min (based on SCr of 1.4 mg/dL).,       CBC (last 72 hours):  Recent Labs   Lab Result Units 10/15/20  1520   WBC K/uL 0.20*   Hemoglobin g/dL 11.5*   Hematocrit % 35.1*   Platelets K/uL 113*   Gran% % 68.0   Lymph% % 32.0   Mono% % 0.0*   Eosinophil% % 0.0   Basophil% % 0.0   Differential Method  Manual       Metabolic Panel (last 72 hours):  Recent Labs   Lab Result Units 10/15/20  1520 10/16/20  0017   Sodium mmol/L 136  --    Potassium mmol/L 2.6* 3.2*   Chloride mmol/L 107  --    CO2 mmol/L 17*  --    Glucose mg/dL 108  --    BUN, Bld mg/dL 20  --    Creatinine mg/dL 1.4  --    Albumin g/dL 2.4*  --    Total Bilirubin mg/dL 0.7  --    Alkaline Phosphatase U/L 65  --    AST U/L 12  --    ALT U/L 14  --    Magnesium mg/dL 2.3  --    Phosphorus mg/dL 2.0*  --        Drug levels (last 3 results):  No results for input(s): VANCOMYCINRA, VANCOMYCINPE, VANCOMYCINTR in the last 72 hours.    Microbiologic Results:  Microbiology Results (last 7 days)       Procedure Component Value Units Date/Time    Blood culture x two cultures. Draw prior to  antibiotics. [[id number]] Collected: 10/15/20 1546    Order Status: Sent Specimen: Blood from Antecubital, Right Arm Updated: 10/16/20 0013    Blood culture x two cultures. Draw prior to antibiotics. [[id number]] Collected: 10/15/20 1626    Order Status: Sent Specimen: Blood from Peripheral, Right Hand Updated: 10/16/20 0013    Clostridium difficile EIA [[id number]]     Order Status: No result Specimen: Stool

## 2020-10-16 NOTE — RESPIRATORY THERAPY
Results for TANG LAUREN (MRN [id number]) as of 10/16/2020 01:29   Ref. Range 10/16/2020 01:17   POC PH Latest Ref Range: 7.35 - 7.45  7.377   POC PCO2 Latest Ref Range: 35 - 45 mmHg 24.8 (L)   POC PO2 Latest Ref Range: 40 - 60 mmHg 42   POC BE Latest Ref Range: -2 to 2 mmol/L -11   POC HCO3 Latest Ref Range: 24 - 28 mmol/L 14.5 (L)   POC SATURATED O2 Latest Ref Range: 95 - 100 % 78 (L)   POC TCO2 Latest Ref Range: 24 - 29 mmol/L 15 (L)   Sample Unknown VENOUS   DelSys Unknown Room Air   Allens Test Unknown N/A   Site Unknown Other         Called to EICU with results.

## 2020-10-16 NOTE — ASSESSMENT & PLAN NOTE
Creatine stable for now. BMP reviewed- noted Estimated Creatinine Clearance: 47.1 mL/min (based on SCr of 1.4 mg/dL). according to latest data. Monitor UOP and serial BMP and adjust therapy as needed. Renally dose meds.

## 2020-10-16 NOTE — ASSESSMENT & PLAN NOTE
Give supplement and monitor level.    Potassium   Date Value Ref Range Status   10/15/2020 2.6 (LL) 3.5 - 5.1 mmol/L Final     Comment:     critical potassium result(s) called and verbal readback obtained   from Sue Beck at 1606 10/15/20 by [facility] 10/15/2020 16:07     10/09/2020 3.5 3.5 - 5.1 mmol/L Final

## 2020-10-16 NOTE — PLAN OF CARE
Pharmacy used is [facility] in [place] near [facility].  PCP is Dr Carmen Krueger. Pt admitted after nausea from last Chemo treatment, tested Covid Neg on 10-15-20, and his S.O. here in room with pt, and she works daily except on Friday's, and she can be reached at cell [phone] as on FS. She also verified [employer] and their address on FS.       10/16/20 8316   Discharge Assessment   Assessment Type Discharge Planning Assessment   Confirmed/corrected address and phone number on facesheet? Yes   Assessment information obtained from? Patient   Expected Length of Stay (days)   (unsure due to in ICU with septic shck and nausea from his last chemo)   Communicated expected length of stay with patient/caregiver yes   Prior to hospitilization cognitive status: Alert/Oriented   Prior to hospitalization functional status: Independent   Current cognitive status: Alert/Oriented   Current Functional Status: Independent;Needs Assistance   Facility Arrived From: Home   Lives With significant other   Able to Return to Prior Arrangements yes   Is patient able to care for self after discharge? Yes   Who are your caregiver(s) and their phone number(s)? S.O,. Mrs Jayden Winston at cell [phone]   Patient's perception of discharge disposition home or selfcare   Readmission Within the Last 30 Days no previous admission in last 30 days   Patient currently being followed by outpatient case management? No   Patient currently receives any other outside agency services? No   Equipment Currently Used at Home bedside commode;shower chair   Part D Coverage [employer] Managed Medicare   Do you have any problems affording any of your prescribed medications? No   Is the patient taking medications as prescribed? yes   Does the patient have transportation home? Yes   Transportation Anticipated family or friend will provide   Dialysis Name and Scheduled days No   Does the patient receive services at the Coumadin Clinic? No    Discharge Plan A Home with family   Discharge Plan B Home with family;Home Health   DME Needed Upon Discharge  none   Patient/Family in Agreement with Plan yes

## 2020-10-16 NOTE — PLAN OF CARE
Pt rec'd from ED 2213 last night.  Pt AAO, RA, bp supported by levo. Pt afebrile on arrival, toward end of shift, max temp 100.8, prn tylenol adm. UA, and cdiff lab complete. VBG complete. Potassium replaced as ordered. CT of abdomen/pelvis w/out contrast complete. ABX adm as ordered. Skin assessment complete, bathed pt.  BM x 1, linen change. Safety maintained with frequent checks, neutropenic, and special contact precautions maintained, no falls or injuries this shift.

## 2020-10-16 NOTE — PLAN OF CARE
Remains on Levophed, being slowly weaned, now at 0.16mcg.  Has had 4 diaper changes today, due to diarrhea stool, with perineal care done each time. No breakdown noted.  Has had abd pain associated with diarrhea, cramping and fairly consistent. Fever as per fllow sheet, with little resolution with Tylenol.  No appetite, but is afraid to eat due to diarrhea, but encouraged to eat.  Now taking sips of broth and crackers.  Med with Morphine 4mg IVP with good result in pain relief and allowing patient to relax.

## 2020-10-17 NOTE — PROGRESS NOTES
"Consult Note  Infectious Disease    Reason for Consult:  C difficile colitis, neutropenia, sepsis    HPI: Alin Burkett is a   70 y.o. male who is status post renal transplant and is receiving chemotherapy for diffuse large B-cell lymphoma, presented to the emergency room on 10/15 with worsening of chronic diarrhea, abdominal cramps of 1 weeks duration.  He was found to be neutropenic with a white blood cell count of 0.2, volume depleted, hypokalemic, was cultured and given fluid resuscitation and vancomycin and cefepime.  He was admitted to the intensive care unit. He was recently given cefpodoxime to take prophylactically associated with his chemotherapy for recurring urinary tract infections.  Most recent positive urine culture was September 18th with E coli sensitive to 3rd generation cephalosporins carbapenems  He has had a hectic fever, stool for C difficile toxin was obtained and was resulted as positive today.  he has had a positive C difficile test before, August 2019.  White blood cells remain depressed at 0.1, platelets are depressed at 91,000, creatinine 1.6.  CT scan of the abdomen obtained last night shows severe proctocolitis without megacolon. Neupogen has been ordered. He is discouraged from eating by severe cramps.      10/17/2020 tmax is improved. Continues to be neutropenic Continues to have cramping, intermittent, abdominal pain. + diarrhea "lost count how many" He had refused vancomycin in am but decided to take it now  Levophed at     Antibiotics (From admission, onward)    Start     Stop Route Frequency Ordered    10/17/20 0000  vancomycin 25 mg/mL oral solution 500 mg  (C. difficile Infection (CDI) Treatment Order Panel)      10/26 0559 Oral Every 6 hours 10/16/20 1755    10/16/20 1900  metronidazole IVPB 500 mg      -- IV Every 8 hours (non-standard times) 10/16/20 1755    10/16/20 0400  cefepime in dextrose 5 % IVPB 2 g  (Med - Cefepime IVPB (Preferred if non-anaphylactic penicillin " allergy))      -- IV Every 12 hours (non-standard times) 10/15/20 2220    10/15/20 222  vancomycin - pharmacy to dose  (vancomycin IVPB)      -- IV pharmacy to manage frequency 10/15/20 222        Antifungals (From admission, onward)    None        Antivirals (From admission, onward)    None            Review of patient's allergies indicates:  No Known Allergies  Past Medical History:   Diagnosis Date    Acidosis     Adrenal adenoma     Anemia associated with chronic renal failure     Arrhythmia, onset 2015    Awaiting organ transplant status 2013    Basal cell carcinoma 2012    left nasal tip    Blood type B+ 2013    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  Renal allograft biopsy 17 (DIVINE): 21 glomeruli, none globally sclerosed, <5% interstitial fibrosis, no ACR, c4d negative, AVR CCT Type 2 (V1 lesion); plan THYMO     Dissecting aortic aneurysm (any part), abdominal     Diverticulosis     Encounter for blood transfusion     ESRD (end stage renal disease) 2010    H/O urethral stricture 2018    H/O: urethral stricture     History of AAA (abdominal aortic aneurysm) repair     History of urethral stricture 2018    Hypertension     Hypokalemia     Hypothyroidism 1/10/2014    Inguinal hernia bilateral, non-recurrent     Kidney stones     Organ transplant candidate 2013    Plantar warts 1/10/2014    Recurrent UTI 2017    S/P kidney transplant     Secondary hyperparathyroidism, renal     Thyroid disease     EBV positive large B-cell lymphoma diagnosed 2020, consistent with post transplant lymphoproliferative disorder  Chemotherapy commenced 2020  Past Surgical  History:   Procedure Laterality Date    ABDOMINAL SURGERY      exploratory lapatomy x 2    ABLATION N/A 8/22/2019    Procedure: ABLATION, SVT;  Surgeon: Emerson Mcmanus MD;  Location: [facility] EP LAB;  Service: Cardiology;  Laterality: N/A;  SVT, RFA, CARTO, anes, GP, 323    AORTA - SUPERIOR MESENTERIC ARTERY BYPASS GRAFT      BLADDER NECK RECONSTRUCTION      BLADDER SURGERY      COLONOSCOPY  10/10/2013    Dr. Gutierrez, repeat in 5 years    CYSTOSCOPY      CYSTOSCOPY N/A 12/19/2018    Procedure: CYSTOSCOPY;  Surgeon: Dewey Mann MD;  [facility];  Service: Urology;  Laterality: N/A;  45 min    CYSTOURETHROSCOPY WITH DIRECT VISION INTERNAL URETHROTOMY N/A 12/19/2018    Procedure: CYSTOSCOPY, WITH DIRECT VISION INTERNAL URETHROTOMY;  Surgeon: Dewey Mann MD;  [facility];  Service: Urology;  Laterality: N/A;    DILATION OF URETHRA N/A 12/19/2018    Procedure: DILATION, URETHRA;  Surgeon: Dewey Mann MD;  [address];  Service: Urology;  Laterality: N/A;    EXCISIONAL BIOPSY N/A 7/13/2020    Procedure: EXCISIONAL BIOPSY- LEFT INGUINAL NODE;  Surgeon: Vlad Epstein MD;  [address];  Service: General;  Laterality: N/A;    FLEXIBLE CYSTOSCOPY N/A 11/6/2019    Procedure: CYSTOSCOPY, FLEXIBLE;  Surgeon: Dewey Mann MD;  [facility];  Service: Urology;  Laterality: N/A;    GASTROJEJUNOSTOMY      HEMORRHOID SURGERY      HERNIA REPAIR      INSERTION OF VENOUS ACCESS PORT Left 7/27/2020    Procedure: INSERTION, VENOUS ACCESS PORT;  Surgeon: Vlad Epstein MD;  Location: [facility] OR [address] [place] [address];  Service: General;  Laterality: Left;    KIDNEY TRANSPLANT      LEFT HEART CATHETERIZATION Left 8/20/2019    Procedure: Left heart cath;  Surgeon: Javan Oscar MD;  Location: [facility] CATH/EP LAB;  Service: Cardiology;  Laterality: Left;    LITHOTRIPSY      LYMPH NODE BIOPSY N/A 6/30/2020    Procedure: BIOPSY, LYMPH NODE;   Surgeon: [facility] Diagnostic Provider;  [facility];  Service: General;  Laterality: N/A;  189 lymph node biopsy /ULTRASOUND    PERCUTANEOUS NEPHROLITHOTRIPSY      right  ESWL  10/31/12    right ESWL  6/26/12    URETHROPLASTY USING PATCH GRAFT N/A 11/6/2019    Procedure: URETHROPLASTY, USING PATCH GRAFT BUCCAL MUCOSA GRAFT;  Surgeon: Dewey Mann MD;  [facility];  Service: Urology;  Laterality: N/A;  3 HOURS       EXAM & DIAGNOSTICS REVIEWED:   Vitals:     Temp:  [99 °F (37.2 °C)-103 °F (39.4 °C)]   Temp: (!) 100.8 °F (38.2 °C) (10/17/20 0400)  Pulse: (!) 111 (10/17/20 0700)  Resp: (!) 22 (10/17/20 0700)  BP: 116/67 (10/17/20 0700)  SpO2: 99 % (10/17/20 0700)    Intake/Output Summary (Last 24 hours) at 10/17/2020 0755  Last data filed at 10/17/2020 0600  Gross per 24 hour   Intake 4417.07 ml   Output 1375 ml   Net 3042.07 ml       General:  In NAD. Alert and attentive, cooperative, uncomfortable, chronically ill appearing  Eyes:  Anicteric, PERRL, EOMI  ENT:  No ulcers, exudates, thrush, nares patent, dentition  good  Neck:  supple, no masses or adenopathy appreciated  Lungs: Clear, no consolidation, rales, wheezes, rub  Heart:  RRR, no gallop/murmur/rub noted  Abd:  Mild distention, hypoactive BS, no masses or organomegaly appreciated. Tender throughout. Multiple scars from prior surgeries  :  Voids/  urine clear, no flank tenderness  Musc:  Joints without effusion, swelling, erythema, synovitis,    Skin:  No rashes. No palmar or plantar lesions. No subungual petechiae  Wound:   Neuro: Alert, attentive, speech fluent, face symmetric, moves all extremities, no focal weakness. Ambulatory  Psych:  Short tempered,  cooperative     Extrem: No edema, erythema, phlebitis, cellulitis, warm and well perfused  VAD:  portacath     Isolation:  Special contact    Lines/Tubes/Drains:    General Labs reviewed:  Recent Labs   Lab 10/15/20  1520 10/16/20  0812 10/17/20  0251   WBC 0.20* 0.11* 0.22*    HGB 11.5* 9.6* 9.7*   HCT 35.1* 29.7* 29.6*   * 91* 69*       Recent Labs   Lab 10/15/20  1520  10/16/20  0812 10/16/20  1828 10/17/20  0251     --  137 133* 133*   K 2.6*   < > 3.8 4.1 3.9     --  111* 111* 109   CO2 17*  --  19* 16* 15*   BUN 20  --  19 19 18   CREATININE 1.4  --  1.6* 1.5* 1.4   CALCIUM 7.9*  --  7.3* 7.3* 7.1*   PROT 5.2*  --   --   --   --    BILITOT 0.7  --   --   --   --    ALKPHOS 65  --   --   --   --    ALT 14  --   --   --   --    AST 12  --   --   --   --     < > = values in this interval not displayed.     Micro:  Microbiology Results (last 7 days)     Procedure Component Value Units Date/Time    Blood culture x two cultures. Draw prior to antibiotics. [[id number]] Collected: 10/15/20 1626    Order Status: Completed Specimen: Blood from Peripheral, Right Hand Updated: 10/17/20 0613     Blood Culture, Routine No Growth to date      No Growth to date    Narrative:      Aerobic and anaerobic    Blood culture x two cultures. Draw prior to antibiotics. [[id number]] Collected: 10/15/20 1546    Order Status: Completed Specimen: Blood from Antecubital, Right Arm Updated: 10/17/20 0613     Blood Culture, Routine No Growth to date      No Growth to date    Narrative:      Aerobic and anaerobic    Clostridium difficile EIA [[id number]]  (Abnormal) Collected: 10/16/20 0358    Order Status: Completed Specimen: Stool Updated: 10/16/20 1057     C. diff Antigen Positive     C difficile Toxins A+B, EIA Positive     Comment: Testing not recommended for children <24 months old.           Imaging Reviewed:   KUB   CXR clear   CT abdomen and pelvis 10/16 :    Findings consistent with severe proctocolitis.    Transplanted kidney right side of the pelvis with mild pelvocaliectasis and perinephric stranding nonspecific.  No calcified stones seen Additional findings as detailed above including cystic lesion with in the native right kidney versus dilatation of collecting structure of the native  right kidney.  Stones in the native right kidney.  Cystic lesion within the pancreas  Cardiology:    IMPRESSION & PLAN   1. Severe C. Difficile colitis   Prompted by oral antibiotics and/or chemotherapy   History of Cdiff 8/2019    2. PTLD, EBV related lymphoma, receiving chemotherapy and neutropenia  3. S/p renal transplant on immunosuppression  4. Chronic diarrhea      Recommendations:  Cefepime for gram negative coverage until neutropenia resolved  neupogen until ANC is well over 1000  IV flagyl and high dose oral vancomyin for Cdiff  Serial KUBs  If he worsens, would add IV tygacil  If he worsens, would consider dfficid  Anti-spasmodics

## 2020-10-17 NOTE — ASSESSMENT & PLAN NOTE
Patient's anemia is currently controlled. Has not received any PRBCs to date.. Etiology likely d/t CKD  Current CBC reviewed-   Lab Results   Component Value Date    HGB 9.6 (L) 10/16/2020    HCT 29.7 (L) 10/16/2020     Monitor serial CBC and transfuse if patient becomes hemodynamically unstable, symptomatic or H/H drops below 7/21.

## 2020-10-17 NOTE — PLAN OF CARE
informed on BP treatment with levophed. Abx continued as orderd. Pt continues to pass mucus stools per brief and informs staff when need for changing brief. Poor oral intake encouraged pt at meals to attempt any amount. Safety maitained

## 2020-10-17 NOTE — PROGRESS NOTES
"[facility] Medicine  Progress Note    Patient Name: Alin Burkett  MRN: [id number]  Patient Class: IP- Inpatient   Admission Date: 10/15/2020  Length of Stay: 1 days  Attending Physician: Brennan Decker MD  Primary Care Provider: Carmen Krueger MD        Subjective:     Principal Problem:Septic shock        HPI:  Patient has been having diarrhea with "jelly-like" consistency as well as crampiness in the abdomen.  Symptoms began roughly one week ago.  Also has had fever and malaise.  Appetite poor.  Fatigue as well.  He has been receiving chemo for PTLD; most recent cycle of CHOP was end of last week.  He has history of renal transplant four years ago and is currently on twice-a-day  Prograf.  He's had no cough, no unusual headache, no sinus pain, and no burning on urination.  He feels that he's probably dehydrated.    Overview/Hospital Course:  No notes on file    Interval History:  Continues with fevers.  Stool is Cdiff positive.  Seen by ID consultant.  Seen by oncology group.  Continues to have crampy abdominal pain.    Review of Systems   Constitutional: Positive for fatigue and fever.   Respiratory: Negative for cough and shortness of breath.    Cardiovascular: Negative for chest pain.   Gastrointestinal: Positive for abdominal pain and diarrhea.     Objective:     Vital Signs (Most Recent):  Temp: 99.8 °F (37.7 °C) (10/16/20 2000)  Pulse: 96 (10/16/20 2000)  Resp: (!) 22 (10/16/20 2000)  BP: (!) 98/58 (10/16/20 2000)  SpO2: 99 % (10/16/20 2000) Vital Signs (24h Range):  Temp:  [98.4 °F (36.9 °C)-103 °F (39.4 °C)] 99.8 °F (37.7 °C)  Pulse:  [] 96  Resp:  [16-32] 22  SpO2:  [64 %-100 %] 99 %  BP: ()/(51-74) 98/58     Weight: 66.9 kg (147 lb 7.8 oz)  Body mass index is 20.57 kg/m².    Intake/Output Summary (Last 24 hours) at 10/16/2020 2258  Last data filed at 10/16/2020 1800  Gross per 24 hour   Intake 4540.07 ml   Output 1650 ml   Net 2890.07 ml      Physical " Exam  Vitals signs reviewed.   Constitutional:       General: He is not in acute distress.     Appearance: He is ill-appearing. He is not diaphoretic.   HENT:      Mouth/Throat:      Mouth: Mucous membranes are dry.   Eyes:      General: No scleral icterus.        Right eye: No discharge.         Left eye: No discharge.   Neck:      Vascular: No JVD.   Cardiovascular:      Rate and Rhythm: Normal rate and regular rhythm.   Pulmonary:      Effort: Pulmonary effort is normal.      Breath sounds: Normal breath sounds.   Abdominal:      General: Bowel sounds are normal. There is no distension.      Palpations: Abdomen is soft.      Tenderness: There is abdominal tenderness in the left upper quadrant and left lower quadrant. There is no rebound.   Skin:     General: Skin is warm.      Findings: No rash.   Neurological:      Mental Status: He is alert.         Significant Labs: All pertinent labs within the past 24 hours have been reviewed.    Significant Imaging: I have reviewed all pertinent imaging results/findings within the past 24 hours.      Assessment/Plan:      * Septic shock  Source is likely colitis from C.difficile.  UA does not appear infected.  Empiric antibiotcs: vancomycin and cefepime.  IV and oral vancomycin.  Continue norepinephrine drip.  Lactate level is ok.      Anemia due to chemotherapy  Patient's anemia is currently controlled. Has not received any PRBCs to date.. Etiology likely d/t chemo  Current CBC reviewed-   Lab Results   Component Value Date    HGB 9.6 (L) 10/16/2020    HCT 29.7 (L) 10/16/2020     Monitor serial CBC and transfuse if patient becomes hemodynamically unstable, symptomatic or H/H drops below 7/21.         Hypokalemia due to excessive gastrointestinal loss of potassium  Give supplement and monitor level.    Potassium   Date Value Ref Range Status   10/16/2020 4.1 3.5 - 5.1 mmol/L Final   10/16/2020 3.8 3.5 - 5.1 mmol/L Final         Febrile neutropenia  Will treat empirically  with cefepime and vancomycin.  Monitor leukocyte count.  Consulting with hematologist for help.  Granix given today.;    WBC   Date Value Ref Range Status   10/16/2020 0.11 (LL) 3.90 - 12.70 K/uL Final     Comment:     critical result(s) called and verbal readback obtained from Pamela Fraire @2782  by [facility] 10/16/2020 09:45         Post-transplant lymphoproliferative disorder (PTLD)  Has been receiving chemo for this.      Stage 3 chronic kidney disease  Creatine stable for now. BMP reviewed- noted Estimated Creatinine Clearance: 44 mL/min (A) (based on SCr of 1.5 mg/dL (H)). according to latest data. Monitor UOP and serial BMP and adjust therapy as needed. Renally dose meds.      -donor kidney transplant  Continue Prograf at usual dose.      Acquired hypothyroidism  Continue levothyroxine.      VTE Risk Mitigation (From admission, onward)         Ordered     IP VTE LOW RISK PATIENT  Once      10/15/20 2220                Discharge Planning   TRINH:      Code Status: Full Code   Is the patient medically ready for discharge?:     Reason for patient still in hospital (select all that apply): Patient unstable, Patient trending condition, Laboratory test, Treatment and Consult recommendations  Discharge Plan A: Home with family            Brennan Decker MD  Department of Hospital Medicine   [facility]

## 2020-10-17 NOTE — PLAN OF CARE
Neuro intact, MAEW, Levophed at 0.16mcgs/kg/min to (L)chest port a cath and D51/2NS at 80cc/hr, SBP maintained >/= 100, abdomen rounded and tender due to cramping, coup[le large loose, mucous yellow Bm's, voids per urinal. Safety and comfort maintained.

## 2020-10-17 NOTE — RESPIRATORY THERAPY
10/16/20 1916   Patient Assessment/Suction   Level of Consciousness (AVPU) alert   Respiratory Effort Unlabored   PRE-TX-O2   O2 Device (Oxygen Therapy) room air   SpO2 95 %   Pulse Oximetry Type Continuous   $ Pulse Oximetry - Multiple Charge Pulse Oximetry - Multiple   Pulse 91   Resp (!) 22   BP (!) 96/59

## 2020-10-17 NOTE — SUBJECTIVE & OBJECTIVE
Interval History:  Afebrile today. Continuing to require levophed to maintain pressure. mentating appropriately. Was refusing vancomycin today. Now amendable.     Review of Systems   Constitutional: Positive for fatigue and fever.   Respiratory: Negative for cough and shortness of breath.    Cardiovascular: Negative for chest pain.   Gastrointestinal: Positive for abdominal pain and diarrhea.     Objective:     Vital Signs (Most Recent):  Temp: 99.7 °F (37.6 °C) (10/17/20 1200)  Pulse: 96 (10/17/20 1200)  Resp: (!) 22 (10/17/20 1200)  BP: 95/68 (10/17/20 1200)  SpO2: 100 % (10/17/20 1200) Vital Signs (24h Range):  Temp:  [98.5 °F (36.9 °C)-103 °F (39.4 °C)] 99.7 °F (37.6 °C)  Pulse:  [] 96  Resp:  [19-39] 22  SpO2:  [60 %-100 %] 100 %  BP: ()/(56-75) 95/68     Weight: 68.1 kg (150 lb 2.1 oz)  Body mass index is 20.94 kg/m².    Intake/Output Summary (Last 24 hours) at 10/17/2020 1245  Last data filed at 10/17/2020 1200  Gross per 24 hour   Intake 4679.47 ml   Output 1025 ml   Net 3654.47 ml      Physical Exam  Vitals signs reviewed.   Constitutional:       General: He is not in acute distress.     Appearance: He is ill-appearing. He is not diaphoretic.   HENT:      Mouth/Throat:      Mouth: Mucous membranes are dry.   Eyes:      General: No scleral icterus.        Right eye: No discharge.         Left eye: No discharge.   Neck:      Vascular: No JVD.   Cardiovascular:      Rate and Rhythm: Normal rate and regular rhythm.   Pulmonary:      Effort: Pulmonary effort is normal.      Breath sounds: Normal breath sounds.   Abdominal:      General: Bowel sounds are normal. There is no distension.      Palpations: Abdomen is soft.      Tenderness: There is abdominal tenderness in the left upper quadrant and left lower quadrant. There is no rebound.   Skin:     General: Skin is warm.      Findings: No rash.   Neurological:      Mental Status: He is alert.         Significant Labs: All pertinent labs within the  past 24 hours have been reviewed.    Significant Imaging: I have reviewed all pertinent imaging results/findings within the past 24 hours.

## 2020-10-17 NOTE — ASSESSMENT & PLAN NOTE
Will treat empirically with cefepime and vancomycin.  Monitor leukocyte count.  Consulting with hematologist for help.  Granix given today.;    WBC   Date Value Ref Range Status   10/16/2020 0.11 (LL) 3.90 - 12.70 K/uL Final     Comment:     critical result(s) called and verbal readback obtained from Pamela Fraire @0944  by [facility] 10/16/2020 09:45

## 2020-10-17 NOTE — PROGRESS NOTES
"[facility] Medicine  Progress Note    Patient Name: Alin Burkett  MRN: [id number]  Patient Class: IP- Inpatient   Admission Date: 10/15/2020  Length of Stay: 2 days  Attending Physician: Peyman Rob,*  Primary Care Provider: Carmen Krueger MD        Subjective:     Principal Problem:Septic shock        HPI:  Patient has been having diarrhea with "jelly-like" consistency as well as crampiness in the abdomen.  Symptoms began roughly one week ago.  Also has had fever and malaise.  Appetite poor.  Fatigue as well.  He has been receiving chemo for PTLD; most recent cycle of CHOP was end of last week.  He has history of renal transplant four years ago and is currently on twice-a-day  Prograf.  He's had no cough, no unusual headache, no sinus pain, and no burning on urination.  He feels that he's probably dehydrated.    Overview/Hospital Course:  No notes on file    Interval History:  Afebrile today. Continuing to require levophed to maintain pressure. mentating appropriately. Was refusing vancomycin today. Now amendable.     Review of Systems   Constitutional: Positive for fatigue and fever.   Respiratory: Negative for cough and shortness of breath.    Cardiovascular: Negative for chest pain.   Gastrointestinal: Positive for abdominal pain and diarrhea.     Objective:     Vital Signs (Most Recent):  Temp: 99.7 °F (37.6 °C) (10/17/20 1200)  Pulse: 96 (10/17/20 1200)  Resp: (!) 22 (10/17/20 1200)  BP: 95/68 (10/17/20 1200)  SpO2: 100 % (10/17/20 1200) Vital Signs (24h Range):  Temp:  [98.5 °F (36.9 °C)-103 °F (39.4 °C)] 99.7 °F (37.6 °C)  Pulse:  [] 96  Resp:  [19-39] 22  SpO2:  [60 %-100 %] 100 %  BP: ()/(56-75) 95/68     Weight: 68.1 kg (150 lb 2.1 oz)  Body mass index is 20.94 kg/m².    Intake/Output Summary (Last 24 hours) at 10/17/2020 1245  Last data filed at 10/17/2020 1200  Gross per 24 hour   Intake 4679.47 ml   Output 1025 ml   Net 3654.47 ml      Physical " Exam  Vitals signs reviewed.   Constitutional:       General: He is not in acute distress.     Appearance: He is ill-appearing. He is not diaphoretic.   HENT:      Mouth/Throat:      Mouth: Mucous membranes are dry.   Eyes:      General: No scleral icterus.        Right eye: No discharge.         Left eye: No discharge.   Neck:      Vascular: No JVD.   Cardiovascular:      Rate and Rhythm: Normal rate and regular rhythm.   Pulmonary:      Effort: Pulmonary effort is normal.      Breath sounds: Normal breath sounds.   Abdominal:      General: Bowel sounds are normal. There is no distension.      Palpations: Abdomen is soft.      Tenderness: There is abdominal tenderness in the left upper quadrant and left lower quadrant. There is no rebound.   Skin:     General: Skin is warm.      Findings: No rash.   Neurological:      Mental Status: He is alert.         Significant Labs: All pertinent labs within the past 24 hours have been reviewed.    Significant Imaging: I have reviewed all pertinent imaging results/findings within the past 24 hours.      Assessment/Plan:      * Septic shock  Source is likely colitis from C.difficile.  UA does not appear infected.  Empiric antibiotcs: vancomycin and cefepime.  IV and oral vancomycin.  Continue norepinephrine drip.  Lactate level is ok.      Anemia due to chemotherapy  Patient's anemia is currently controlled. Has not received any PRBCs to date.. Etiology likely d/t chemo  Current CBC reviewed-   Lab Results   Component Value Date    HGB 9.6 (L) 10/16/2020    HCT 29.7 (L) 10/16/2020     Monitor serial CBC and transfuse if patient becomes hemodynamically unstable, symptomatic or H/H drops below 7/21.         Hypokalemia due to excessive gastrointestinal loss of potassium  Give supplement and monitor level.    Potassium   Date Value Ref Range Status   10/16/2020 4.1 3.5 - 5.1 mmol/L Final   10/16/2020 3.8 3.5 - 5.1 mmol/L Final         Febrile neutropenia  Will treat empirically  with cefepime and vancomycin.  Monitor leukocyte count.  Consulting with hematologist for help.  Granix given today.;    WBC   Date Value Ref Range Status   10/16/2020 0.11 (LL) 3.90 - 12.70 K/uL Final     Comment:     critical result(s) called and verbal readback obtained from Pamela Fraire @0944  by [facility] 10/16/2020 09:45         Post-transplant lymphoproliferative disorder (PTLD)  Has been receiving chemo for this.      Stage 3 chronic kidney disease  Creatine stable for now. BMP reviewed- noted Estimated Creatinine Clearance: 44 mL/min (A) (based on SCr of 1.5 mg/dL (H)). according to latest data. Monitor UOP and serial BMP and adjust therapy as needed. Renally dose meds.      -donor kidney transplant  Continue Prograf at usual dose.      Acquired hypothyroidism  Continue levothyroxine.      VTE Risk Mitigation (From admission, onward)         Ordered     IP VTE LOW RISK PATIENT  Once      10/15/20 2220                Discharge Planning   TRINH:      Code Status: Full Code   Is the patient medically ready for discharge?:     Reason for patient still in hospital (select all that apply): Patient trending condition and Treatment  Discharge Plan A: Home with family            Critical care time spent on the evaluation and treatment of severe organ dysfunction, review of pertinent labs and imaging studies, discussions with consulting providers and discussions with patient/family: 25 minutes.      Peyman Rob MD  Department of Hospital Medicine   [facility]

## 2020-10-17 NOTE — PROGRESS NOTES
Pharmacokinetic Assessment Follow Up: IV Vancomycin    Vancomycin serum concentration assessment(s):    The random level was drawn correctly and can be used to guide therapy at this time. The measurement is below the desired definitive target range of 15 to 20 mcg/mL.    Vancomycin Regimen Plan:    Pharmacy dosing by level. Pharmacy will re-dose vancomycin 1000 mg x 1. Vancomycin random level ordered for 10/17 at 1400 (18 hrs level)    Drug levels (last 3 results):  Recent Labs   Lab Result Units 10/16/20  1828   Vancomycin, Random ug/mL 7.5       Pharmacy will continue to follow and monitor vancomycin.    Please contact pharmacy at extension [phone] for questions regarding this assessment.    Thank you for the consult,   Alessia Gonzalez       Patient brief summary:  Alin Burkett is a 70 y.o. male initiated on antimicrobial therapy with IV Vancomycin for treatment of neutropenic fever    The patient's current regimen is pulse dosing    Drug Allergies:   Review of patient's allergies indicates:  No Known Allergies    Actual Body Weight:   66.9    Renal Function:   Estimated Creatinine Clearance: 44 mL/min (A) (based on SCr of 1.5 mg/dL (H)).,     Dialysis Method (if applicable):  N/A    CBC (last 72 hours):  Recent Labs   Lab Result Units 10/15/20  1520 10/16/20  0812   WBC K/uL 0.20* 0.11*   Hemoglobin g/dL 11.5* 9.6*   Hematocrit % 35.1* 29.7*   Platelets K/uL 113* 91*   Gran% % 68.0 9.0*   Lymph% % 32.0 36.4   Mono% % 0.0* 45.5*   Eosinophil% % 0.0 9.1*   Basophil% % 0.0 0.0   Differential Method  Manual Automated       Metabolic Panel (last 72 hours):  Recent Labs   Lab Result Units 10/15/20  1520 10/16/20  0017 10/16/20  0350 10/16/20  0812 10/16/20  1828   Sodium mmol/L 136  --   --  137 133*   Potassium mmol/L 2.6* 3.2*  --  3.8 4.1   Chloride mmol/L 107  --   --  111* 111*   CO2 mmol/L 17*  --   --  19* 16*   Glucose mg/dL 108  --   --  96 98   Glucose, UA   --   --  Negative  --   --    BUN, Bld mg/dL 20  --    --  19 19   Creatinine mg/dL 1.4  --   --  1.6* 1.5*   Albumin g/dL 2.4*  --   --   --   --    Total Bilirubin mg/dL 0.7  --   --   --   --    Alkaline Phosphatase U/L 65  --   --   --   --    AST U/L 12  --   --   --   --    ALT U/L 14  --   --   --   --    Magnesium mg/dL 2.3  --   --   --   --    Phosphorus mg/dL 2.0*  --   --   --   --        Vancomycin Administrations:  vancomycin given in the last 96 hours                     vancomycin 25 mg/mL oral solution 125 mg (mg) 125 mg Given 10/16/20 1729     125 mg Given  1112     125 mg Given  0549    vancomycin in dextrose 5 % 1 gram/250 mL IVPB 1,000 mg (mg) 1,000 mg New Bag 10/16/20 0059                    Microbiologic Results:  Microbiology Results (last 7 days)       Procedure Component Value Units Date/Time    Clostridium difficile EIA [[id number]]  (Abnormal) Collected: 10/16/20 0358    Order Status: Completed Specimen: Stool Updated: 10/16/20 1057     C. diff Antigen Positive     C difficile Toxins A+B, EIA Positive     Comment: Testing not recommended for children <24 months old.       Blood culture x two cultures. Draw prior to antibiotics. [[id number]] Collected: 10/15/20 1546    Order Status: Completed Specimen: Blood from Antecubital, Right Arm Updated: 10/16/20 0745     Blood Culture, Routine No Growth to date    Narrative:      Aerobic and anaerobic    Blood culture x two cultures. Draw prior to antibiotics. [[id number]] Collected: 10/15/20 1626    Order Status: Completed Specimen: Blood from Peripheral, Right Hand Updated: 10/16/20 0745     Blood Culture, Routine No Growth to date    Narrative:      Aerobic and anaerobic

## 2020-10-17 NOTE — NURSING
"Refused Vancomycin 500mg po. States " I don't want to take that medicine. I am having all the side effects that are listed for that medicine. I want to wait to take it until I can talk to a doctor." I explained that the Vancomycin po was treating his Cdiff and that is why he is here. He stated he understands what Cdiff is and that this medicine he is refusing is the treatment for Cdiff. I also explained that's the reason for his abdominal pain and diarrhea. Stated he understands but still refuses to take the po Vancomycin.  "

## 2020-10-17 NOTE — ASSESSMENT & PLAN NOTE
Patient's anemia is currently controlled. Has not received any PRBCs to date.. Etiology likely d/t chemo  Current CBC reviewed-   Lab Results   Component Value Date    HGB 9.6 (L) 10/16/2020    HCT 29.7 (L) 10/16/2020     Monitor serial CBC and transfuse if patient becomes hemodynamically unstable, symptomatic or H/H drops below 7/21.

## 2020-10-17 NOTE — CARE UPDATE
This note also relates to the following rows which could not be included:  SpO2 - Cannot attach notes to unvalidated device data  Pulse - Cannot attach notes to unvalidated device data  Resp - Cannot attach notes to unvalidated device data  BP - Cannot attach notes to unvalidated device data       10/17/20 0721   PRE-TX-O2   O2 Device (Oxygen Therapy) room air   Pulse Oximetry Type Intermittent   $ Pulse Oximetry - Multiple Charge Pulse Oximetry - Multiple

## 2020-10-17 NOTE — PROGRESS NOTES
Pharmacokinetic Assessment Follow Up: IV Vancomycin    Vancomycin serum concentration assessment(s):    The random level was drawn correctly and can be used to guide therapy at this time. The measurement is below the desired definitive target range of 15 to 20 mcg/mL.    Vancomycin Regimen Plan:    Continue pulse dosing. Vancomycin 1000 mg IV once with next serum level measured at 0930 prior to next dose on 10/18/20.    Drug levels (last 3 results):  Recent Labs   Lab Result Units 10/16/20  1828 10/17/20  1438   Vancomycin, Random ug/mL 7.5 12.9       Pharmacy will continue to follow and monitor vancomycin.    Please contact pharmacy at extension [phone] for questions regarding this assessment.    Thank you for the consult,   Mara Ricks       Patient brief summary:  Alin Burkett is a 70 y.o. male initiated on antimicrobial therapy with IV Vancomycin for treatment of neutropenic fever    The patient's current regimen is pulse dosing.    Drug Allergies:   Review of patient's allergies indicates:  No Known Allergies    Actual Body Weight:   68.1 kg    Renal Function:   Estimated Creatinine Clearance: 48 mL/min (based on SCr of 1.4 mg/dL).,     Dialysis Method (if applicable):  N/A    CBC (last 72 hours):  Recent Labs   Lab Result Units 10/15/20  1520 10/16/20  0812 10/17/20  0251   WBC K/uL 0.20* 0.11* 0.22*   Hemoglobin g/dL 11.5* 9.6* 9.7*   Hematocrit % 35.1* 29.7* 29.6*   Platelets K/uL 113* 91* 69*   Gran% % 68.0 9.0* 32.0*   Lymph% % 32.0 36.4 56.0*   Mono% % 0.0* 45.5* 8.0   Eosinophil% % 0.0 9.1* 4.0   Basophil% % 0.0 0.0 0.0   Differential Method  Manual Automated Manual       Metabolic Panel (last 72 hours):  Recent Labs   Lab Result Units 10/15/20  1520 10/16/20  0017 10/16/20  0350 10/16/20  0812 10/16/20  1828 10/17/20  0251   Sodium mmol/L 136  --   --  137 133* 133*   Potassium mmol/L 2.6* 3.2*  --  3.8 4.1 3.9   Chloride mmol/L 107  --   --  111* 111* 109   CO2 mmol/L 17*  --   --  19* 16* 15*    Glucose mg/dL 108  --   --  96 98 101   Glucose, UA   --   --  Negative  --   --   --    BUN, Bld mg/dL 20  --   --  19 19 18   Creatinine mg/dL 1.4  --   --  1.6* 1.5* 1.4   Albumin g/dL 2.4*  --   --   --   --   --    Total Bilirubin mg/dL 0.7  --   --   --   --   --    Alkaline Phosphatase U/L 65  --   --   --   --   --    AST U/L 12  --   --   --   --   --    ALT U/L 14  --   --   --   --   --    Magnesium mg/dL 2.3  --   --   --   --   --    Phosphorus mg/dL 2.0*  --   --   --   --   --        Vancomycin Administrations:  vancomycin given in the last 96 hours                     vancomycin 25 mg/mL oral solution 500 mg (mg) 500 mg Given 10/17/20 1256    vancomycin in dextrose 5 % 1 gram/250 mL IVPB 1,000 mg (mg) 1,000 mg New Bag 10/16/20 2119    vancomycin 25 mg/mL oral solution 125 mg (mg) 125 mg Given 10/16/20 1729     125 mg Given  1112     125 mg Given  0549    vancomycin in dextrose 5 % 1 gram/250 mL IVPB 1,000 mg (mg) 1,000 mg New Bag 10/16/20 0059                    Microbiologic Results:  Microbiology Results (last 7 days)       Procedure Component Value Units Date/Time    Blood culture x two cultures. Draw prior to antibiotics. [[id number]] Collected: 10/15/20 1626    Order Status: Completed Specimen: Blood from Peripheral, Right Hand Updated: 10/17/20 0613     Blood Culture, Routine No Growth to date      No Growth to date    Narrative:      Aerobic and anaerobic    Blood culture x two cultures. Draw prior to antibiotics. [[id number]] Collected: 10/15/20 1546    Order Status: Completed Specimen: Blood from Antecubital, Right Arm Updated: 10/17/20 0613     Blood Culture, Routine No Growth to date      No Growth to date    Narrative:      Aerobic and anaerobic    Clostridium difficile EIA [[id number]]  (Abnormal) Collected: 10/16/20 0709    Order Status: Completed Specimen: Stool Updated: 10/16/20 1057     C. diff Antigen Positive     C difficile Toxins A+B, EIA Positive     Comment: Testing not  recommended for children <24 months old.

## 2020-10-17 NOTE — SUBJECTIVE & OBJECTIVE
Interval History:  Continues with fevers.  Stool is Cdiff positive.  Seen by ID consultant.  Seen by oncology group.  Continues to have crampy abdominal pain.    Review of Systems   Constitutional: Positive for fatigue and fever.   Respiratory: Negative for cough and shortness of breath.    Cardiovascular: Negative for chest pain.   Gastrointestinal: Positive for abdominal pain and diarrhea.     Objective:     Vital Signs (Most Recent):  Temp: 99.8 °F (37.7 °C) (10/16/20 2000)  Pulse: 96 (10/16/20 2000)  Resp: (!) 22 (10/16/20 2000)  BP: (!) 98/58 (10/16/20 2000)  SpO2: 99 % (10/16/20 2000) Vital Signs (24h Range):  Temp:  [98.4 °F (36.9 °C)-103 °F (39.4 °C)] 99.8 °F (37.7 °C)  Pulse:  [] 96  Resp:  [16-32] 22  SpO2:  [64 %-100 %] 99 %  BP: ()/(51-74) 98/58     Weight: 66.9 kg (147 lb 7.8 oz)  Body mass index is 20.57 kg/m².    Intake/Output Summary (Last 24 hours) at 10/16/2020 2258  Last data filed at 10/16/2020 1800  Gross per 24 hour   Intake 4540.07 ml   Output 1650 ml   Net 2890.07 ml      Physical Exam  Vitals signs reviewed.   Constitutional:       General: He is not in acute distress.     Appearance: He is ill-appearing. He is not diaphoretic.   HENT:      Mouth/Throat:      Mouth: Mucous membranes are dry.   Eyes:      General: No scleral icterus.        Right eye: No discharge.         Left eye: No discharge.   Neck:      Vascular: No JVD.   Cardiovascular:      Rate and Rhythm: Normal rate and regular rhythm.   Pulmonary:      Effort: Pulmonary effort is normal.      Breath sounds: Normal breath sounds.   Abdominal:      General: Bowel sounds are normal. There is no distension.      Palpations: Abdomen is soft.      Tenderness: There is abdominal tenderness in the left upper quadrant and left lower quadrant. There is no rebound.   Skin:     General: Skin is warm.      Findings: No rash.   Neurological:      Mental Status: He is alert.         Significant Labs: All pertinent labs within the  past 24 hours have been reviewed.    Significant Imaging: I have reviewed all pertinent imaging results/findings within the past 24 hours.

## 2020-10-17 NOTE — ASSESSMENT & PLAN NOTE
Give supplement and monitor level.    Potassium   Date Value Ref Range Status   10/16/2020 4.1 3.5 - 5.1 mmol/L Final   10/16/2020 3.8 3.5 - 5.1 mmol/L Final

## 2020-10-17 NOTE — ASSESSMENT & PLAN NOTE
Source is likely colitis from C.difficile.  UA does not appear infected.  Empiric antibiotcs: vancomycin and cefepime.  IV and oral vancomycin.  Continue norepinephrine drip.  Lactate level is ok.

## 2020-10-17 NOTE — ASSESSMENT & PLAN NOTE
Creatine stable for now. BMP reviewed- noted Estimated Creatinine Clearance: 44 mL/min (A) (based on SCr of 1.5 mg/dL (H)). according to latest data. Monitor UOP and serial BMP and adjust therapy as needed. Renally dose meds.

## 2020-10-18 NOTE — RESPIRATORY THERAPY
10/18/20 0759   PRE-TX-O2   O2 Device (Oxygen Therapy) room air   Pulse Oximetry Type Continuous   $ Pulse Oximetry - Multiple Charge Pulse Oximetry - Multiple

## 2020-10-18 NOTE — PROGRESS NOTES
Pharmacokinetic Assessment Follow Up: IV Vancomycin    Vancomycin serum concentration assessment(s):    The random level was drawn correctly and can be used to guide therapy at this time. The measurement is within the desired definitive target range of 15 to 20 mcg/mL.    Vancomycin Regimen Plan:    Continue pulse dosing due to fluctuating renal function.   Vancomycin 500 mg IV once with serum concentration measured on 10/19/20 at 0430 prior to next dose.    Drug levels (last 3 results):  Recent Labs   Lab Result Units 10/16/20  1828 10/17/20  1438 10/18/20  0927   Vancomycin, Random ug/mL 7.5 12.9 18.1       Pharmacy will continue to follow and monitor vancomycin.    Please contact pharmacy at extension [phone] for questions regarding this assessment.    Thank you for the consult,   Mara Ricks       Patient brief summary:  Alin Burkett is a 70 y.o. male initiated on antimicrobial therapy with IV Vancomycin for treatment of neutropenic fever.    The patient's current regimen is pulse dosing.    Drug Allergies:   Review of patient's allergies indicates:  No Known Allergies    Actual Body Weight:   68.1 kg    Renal Function:   Estimated Creatinine Clearance: 39.5 mL/min (A) (based on SCr of 1.7 mg/dL (H)).,     Dialysis Method (if applicable):  N/A    CBC (last 72 hours):  Recent Labs   Lab Result Units 10/15/20  1520 10/16/20  0812 10/17/20  0251 10/18/20  0522   WBC K/uL 0.20* 0.11* 0.22* 1.43*   Hemoglobin g/dL 11.5* 9.6* 9.7* 9.5*   Hematocrit % 35.1* 29.7* 29.6* 29.0*   Platelets K/uL 113* 91* 69* 50*   Gran% % 68.0 9.0* 32.0* 72.0   Lymph% % 32.0 36.4 56.0* 16.0*   Mono% % 0.0* 45.5* 8.0 8.0   Eosinophil% % 0.0 9.1* 4.0 4.0   Basophil% % 0.0 0.0 0.0 0.0   Differential Method  Manual Automated Manual Manual       Metabolic Panel (last 72 hours):  Recent Labs   Lab Result Units 10/15/20  1520 10/16/20  0017 10/16/20  0350 10/16/20  0812 10/16/20  1828 10/17/20  0251 10/18/20  0522 10/18/20  0927   Sodium  mmol/L 136  --   --  137 133* 133* 126* 129*   Potassium mmol/L 2.6* 3.2*  --  3.8 4.1 3.9 3.7 4.0   Chloride mmol/L 107  --   --  111* 111* 109 106 108   CO2 mmol/L 17*  --   --  19* 16* 15* 12* 12*   Glucose mg/dL 108  --   --  96 98 101 371* 148*   Glucose, UA   --   --  Negative  --   --   --   --   --    BUN, Bld mg/dL 20  --   --  19 19 18 22 25*   Creatinine mg/dL 1.4  --   --  1.6* 1.5* 1.4 1.7* 1.7*   Albumin g/dL 2.4*  --   --   --   --   --   --   --    Total Bilirubin mg/dL 0.7  --   --   --   --   --   --   --    Alkaline Phosphatase U/L 65  --   --   --   --   --   --   --    AST U/L 12  --   --   --   --   --   --   --    ALT U/L 14  --   --   --   --   --   --   --    Magnesium mg/dL 2.3  --   --   --   --   --   --   --    Phosphorus mg/dL 2.0*  --   --   --   --   --   --   --        Vancomycin Administrations:  vancomycin given in the last 96 hours                     vancomycin 25 mg/mL oral solution 500 mg (mg) 500 mg Given 10/18/20 0636     500 mg Given  0040     500 mg Given 10/17/20 1718     500 mg Given  1256    vancomycin in dextrose 5 % 1 gram/250 mL IVPB 1,000 mg (mg) 1,000 mg New Bag 10/17/20 1718    vancomycin in dextrose 5 % 1 gram/250 mL IVPB 1,000 mg (mg) 1,000 mg New Bag 10/16/20 2119    vancomycin 25 mg/mL oral solution 125 mg (mg) 125 mg Given 10/16/20 1729     125 mg Given  1112     125 mg Given  0549    vancomycin in dextrose 5 % 1 gram/250 mL IVPB 1,000 mg (mg) 1,000 mg New Bag 10/16/20 0059                    Microbiologic Results:  Microbiology Results (last 7 days)       Procedure Component Value Units Date/Time    Blood culture x two cultures. Draw prior to antibiotics. [[id number]] Collected: 10/15/20 1626    Order Status: Completed Specimen: Blood from Peripheral, Right Hand Updated: 10/18/20 0613     Blood Culture, Routine No Growth to date      No Growth to date      No Growth to date    Narrative:      Aerobic and anaerobic    Blood culture x two cultures. Draw prior to  antibiotics. [[id number]] Collected: 10/15/20 1546    Order Status: Completed Specimen: Blood from Antecubital, Right Arm Updated: 10/18/20 0613     Blood Culture, Routine No Growth to date      No Growth to date      No Growth to date    Narrative:      Aerobic and anaerobic    Clostridium difficile EIA [[id number]]  (Abnormal) Collected: 10/16/20 0358    Order Status: Completed Specimen: Stool Updated: 10/16/20 1057     C. diff Antigen Positive     C difficile Toxins A+B, EIA Positive     Comment: Testing not recommended for children <24 months old.

## 2020-10-18 NOTE — PLAN OF CARE
Clabsi care reviewed. Cleaned of mucus stool x2 thus far. IVF changed to NS and rate increased to 125ml/hr. Continue levophed to keep SBP above 100. Encouraged food intake.  Safety maintained

## 2020-10-18 NOTE — CONSULTS
[facility] [place]  Cardiology  Consult Note    Patient Name: Alin Burkett  MRN: [id number]  Admission Date: 10/15/2020  Hospital Length of Stay: 3 days  Code Status: Full Code   Attending Provider: Peyman Rob,*   Consulting Provider: Erica Lozano MD  Primary Care Physician: Carmen Krueger MD  Principal Problem:Septic shock    Patient information was obtained from patient, past medical records and ER records.     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  He was admitted with C diff proctocolitis.  He is currently on antibiotics.  He is having severe diarrhea however he states that his diarrhea has improved.  He was noted to have PACs on the telemetry and hence Cardiology was consulted.  I reviewed the rhythm strips on the chart and he did not have any episodes of atrial fibrillation.  He had sinus tachycardia with PACs.  He is in shock and was started on Levophed.  He denies any chest pain, palpitations, lightheadedness, dizziness.  He reports shortness of breath particularly after he started having the abdominal distension.    Past Medical History:   Diagnosis Date    Acidosis     Adrenal adenoma     Anemia associated with chronic renal failure     Arrhythmia, onset 1995 5/1/2015    Awaiting organ transplant status 11/26/2013    Basal cell carcinoma 06/12/2012    left nasal tip    Blood type B+ 11/26/2013    Calcium nephrolithiasis 10/16/2012    Cancer     Celiac artery dissection     Chronic diarrhea     Chronic urethral stricture      Congenital absence of kidney     left    -donor kidney transplant 16     Induced w Campath 30 mg IV intraoperatively & SoluMedrol 875 mg total over 3 days.  Renal allograft biopsy 17 (DIVINE): 21 glomeruli, none globally sclerosed, <5% interstitial fibrosis, no ACR, c4d negative, AVR CCT Type 2 (V1 lesion); plan THYMO     Dissecting aortic aneurysm (any part), abdominal     Diverticulosis     Encounter for blood transfusion     ESRD (end stage renal disease) 2010    H/O urethral stricture 2018    H/O: urethral stricture     History of AAA (abdominal aortic aneurysm) repair     History of urethral stricture 2018    Hypertension     Hypokalemia     Hypothyroidism 1/10/2014    Inguinal hernia bilateral, non-recurrent     Kidney stones     Organ transplant candidate 2013    Plantar warts 1/10/2014    Recurrent UTI 2017    S/P kidney transplant     Secondary hyperparathyroidism, renal     Thyroid disease        Past Surgical History:   Procedure Laterality Date    ABDOMINAL SURGERY      exploratory lapatomy x 2    ABLATION N/A 2019    Procedure: ABLATION, SVT;  Surgeon: Emerson Mcmanus MD;  Location: [facility] EP LAB;  Service: Cardiology;  Laterality: N/A;  SVT, RFA, CARTO, anes, GP, 323    AORTA - SUPERIOR MESENTERIC ARTERY BYPASS GRAFT      BLADDER NECK RECONSTRUCTION      BLADDER SURGERY      COLONOSCOPY  10/10/2013    Dr. Gutierrez, repeat in 5 years    CYSTOSCOPY      CYSTOSCOPY N/A 2018    Procedure: CYSTOSCOPY;  Surgeon: Dewey Mann MD;  Location: [facility] OR [address] [place] [address];  Service: Urology;  Laterality: N/A;  45 min    CYSTOURETHROSCOPY WITH DIRECT VISION INTERNAL URETHROTOMY N/A 2018    Procedure: CYSTOSCOPY, WITH DIRECT VISION INTERNAL URETHROTOMY;  Surgeon: Dewey Mann MD;  Location: [facility] OR [address] [place] [address];  Service: Urology;  Laterality: N/A;    DILATION OF URETHRA N/A 2018    Procedure: DILATION, URETHRA;  Surgeon: Dewey DUMONT  MD Johnathan;  [facility];  Service: Urology;  Laterality: N/A;    EXCISIONAL BIOPSY N/A 7/13/2020    Procedure: EXCISIONAL BIOPSY- LEFT INGUINAL NODE;  Surgeon: Vlad Epstein MD;  [facility];  Service: General;  Laterality: N/A;    FLEXIBLE CYSTOSCOPY N/A 11/6/2019    Procedure: CYSTOSCOPY, FLEXIBLE;  Surgeon: Dewey Mann MD;  Location: [address];  Service: Urology;  Laterality: N/A;    GASTROJEJUNOSTOMY      HEMORRHOID SURGERY      HERNIA REPAIR      INSERTION OF VENOUS ACCESS PORT Left 7/27/2020    Procedure: INSERTION, VENOUS ACCESS PORT;  Surgeon: Vlad Epstein MD;  Location: [facility] OR [address] [place] [address];  Service: General;  Laterality: Left;    KIDNEY TRANSPLANT      LEFT HEART CATHETERIZATION Left 8/20/2019    Procedure: Left heart cath;  Surgeon: Javan Oscar MD;  Location: [facility] CATH/EP LAB;  Service: Cardiology;  Laterality: Left;    LITHOTRIPSY      LYMPH NODE BIOPSY N/A 6/30/2020    Procedure: BIOPSY, LYMPH NODE;  Surgeon: [facility] Provider;  Location: [address];  Service: General;  Laterality: N/A;  189 lymph node biopsy /ULTRASOUND    PERCUTANEOUS NEPHROLITHOTRIPSY      right  ESWL  10/31/12    right ESWL  6/26/12    URETHROPLASTY USING PATCH GRAFT N/A 11/6/2019    Procedure: URETHROPLASTY, USING PATCH GRAFT BUCCAL MUCOSA GRAFT;  Surgeon: Dewey Mann MD;  Location: [address];  Service: Urology;  Laterality: N/A;  3 HOURS       Review of patient's allergies indicates:  No Known Allergies    No current facility-administered medications on file prior to encounter.      Current Outpatient Medications on File Prior to Encounter   Medication Sig    allopurinoL (ZYLOPRIM) 300 MG tablet Take 1 tablet (300 mg total) by mouth once daily.    aspirin (ECOTRIN) 81 MG EC tablet Take 1 tablet (81 mg total) by mouth once daily.    calcitRIOL (ROCALTROL) 0.5 MCG Cap Take 1 capsule (0.5 mcg total) by mouth once daily.    cefpodoxime  (VANTIN) 100 MG tablet Take 1 tablet (100 mg total) by mouth every 12 (twelve) hours.    COMBIGAN 0.2-0.5 % Drop Place 1 drop into both eyes 2 (two) times a day.     famotidine (PEPCID) 20 MG tablet TAKE 1 TABLET EVERY EVENING (Patient taking differently: Take 20 mg by mouth every evening. )    ketoconazole (NIZORAL) 200 mg Tab TAKE ONE-HALF (1/2) TABLET ONCE DAILY (Patient taking differently: Take 100 mg by mouth once daily. )    levothyroxine (SYNTHROID) 100 MCG tablet TAKE 1 TABLET DAILY (Patient taking differently: Take 100 mcg by mouth before breakfast. Administer on an empty stomach at least 30 minutes before food. If receiving tube feeds, HOLD tube feeds for 1 hour before and after levothyroxine administration.)    magnesium oxide (MAG-OX) 400 mg (241.3 mg magnesium) tablet Take 1 tablet (400 mg total) by mouth once daily.    multivitamin (ONE DAILY MULTIVITAMIN) per tablet Take 1 tablet by mouth once daily.    ondansetron (ZOFRAN-ODT) 8 MG TbDL Dissolve 1 tablet (8 mg total) by mouth every 12 (twelve) hours as needed. (Patient taking differently: Take 8 mg by mouth every 12 (twelve) hours as needed (nausea). )    predniSONE (DELTASONE) 50 MG Tab Take 2 tablets (100 mg total) by mouth once daily. Take on days 2-5 of your chemotherapy cycles.    sodium bicarbonate 650 MG tablet Take 1 tablet (650 mg total) by mouth 2 (two) times daily.    tacrolimus (PROGRAF) 1 MG Cap Take 3 capsules (3 mg total) by mouth every morning AND 2 capsules (2 mg total) every evening. Z94.0/Kidney Transplant on 11/26/16.    travoprost (TRAVATAN Z) 0.004 % ophthalmic solution Place 1 drop into both eyes every evening.        Scheduled Meds:   allopurinoL  300 mg Oral Daily    aspirin  81 mg Oral Daily    brimonidine-timoloL  1 drop Both Eyes Q12H    calcitRIOL  0.5 mcg Oral Daily    ceFEPime (MAXIPIME) IVPB  2 g Intravenous Q12H    dicyclomine  10 mg Oral QID    famotidine  20 mg Oral QHS    levothyroxine  100 mcg  Oral Before breakfast    metronidazole  500 mg Intravenous Q8H    tacrolimus  2 mg Oral Daily PM    tacrolimus  3 mg Oral Daily AM    tbo-filgrastim  300 mcg Subcutaneous Daily    travoprost  1 drop Both Eyes QHS    vancomycin  500 mg Oral Q6H     Continuous Infusions:   sodium chloride 0.9% 125 mL/hr at 10/18/20 1135    norepinephrine bitartrate-D5W 0.158 mcg/kg/min (10/18/20 0222)     PRN Meds:.acetaminophen, hyoscyamine, magnesium sulfate IVPB, magnesium sulfate IVPB, metoclopramide HCl, morphine, morphine, ondansetron, potassium chloride in water **AND** potassium chloride in water **AND** potassium chloride in water, promethazine, sodium chloride 0.9%, Pharmacy to dose Vancomycin consult **AND** vancomycin - pharmacy to dose    Family History     Problem Relation (Age of Onset)    Alcohol abuse Father    Alzheimer's disease Mother    Cancer Brother    Colon cancer Brother    Diabetes Mother    HIV Brother    Kidney disease Paternal Uncle, Cousin    No Known Problems Sister, Daughter, Sister, Brother, Brother    Stroke Maternal Aunt          Tobacco Use    Smoking status: Former Smoker     Packs/day: 0.50     Years: 40.00     Pack years: 20.00     Types: Cigarettes     Quit date: 6/16/2010     Years since quitting: 10.3    Smokeless tobacco: Never Used   Substance and Sexual Activity    Alcohol use: Yes     Alcohol/week: 3.0 standard drinks     Types: 3 Cans of beer per week     Comment: occasional/social    Drug use: No     Comment: THC in youth    Sexual activity: Yes     Partners: Female     Birth control/protection: None       ROS   No significant headaches or sore throat or runny nose.   No recent changes in vision.   No recent changes in hearing.  No dysphagia or odynophagia.  Reports shortness of breath.   Denies any cough or hemoptysis.   Report abdominal pain, diarrhea.   Denies any dysuria or polyuria.   Denies any fevers or chills.   Denies any recent significant weight changes.   Denies  bleeding diathesis    Objective:     Vital Signs (Most Recent):  Temp: 98.6 °F (37 °C) (10/18/20 1200)  Pulse: 97 (10/18/20 1200)  Resp: (!) 26 (10/18/20 1200)  BP: 111/64 (10/18/20 1200)  SpO2: (!) 94 % (10/18/20 1200) Vital Signs (24h Range):  Temp:  [97.9 °F (36.6 °C)-99.8 °F (37.7 °C)] 98.6 °F (37 °C)  Pulse:  [] 97  Resp:  [16-38] 26  SpO2:  [94 %-100 %] 94 %  BP: ()/(61-78) 111/64     Weight: 68.1 kg (150 lb 2.1 oz)  Body mass index is 20.94 kg/m².    SpO2: (!) 94 %  O2 Device (Oxygen Therapy): room air      Intake/Output Summary (Last 24 hours) at 10/18/2020 1316  Last data filed at 10/18/2020 1200  Gross per 24 hour   Intake 3756.79 ml   Output 1100 ml   Net 2656.79 ml       Lines/Drains/Airways     Central Venous Catheter Line                 PowerPort A Cath Single Lumen 10/15/20 1502 left subclavian 2 days                Physical Exam  HEENT: Normocephalic, atraumatic, PERRL, Conjunctiva pink, no scleral icterus.   CVS: S1S2+, RRR, no murmurs, rubs or gallops, JVP: Normal.  LUNGS: Clear  ABDOMEN:  Distended, bowel sounds positive.  EXTREMITIES: No cyanosis, clubbing or edema  NEURO: AAO X 3.       Significant Labs:   BMP:   Recent Labs   Lab 10/17/20  0251 10/18/20  0522 10/18/20  0927    371* 148*   * 126* 129*   K 3.9 3.7 4.0    106 108   CO2 15* 12* 12*   BUN 18 22 25*   CREATININE 1.4 1.7* 1.7*   CALCIUM 7.1* 7.5* 7.8*   , CMP   Recent Labs   Lab 10/17/20  0251 10/18/20  0522 10/18/20  0927   * 126* 129*   K 3.9 3.7 4.0    106 108   CO2 15* 12* 12*    371* 148*   BUN 18 22 25*   CREATININE 1.4 1.7* 1.7*   CALCIUM 7.1* 7.5* 7.8*   ANIONGAP 9 8 9   ESTGFRAFRICA 59* 47* 47*   EGFRNONAA 51* 40* 40*   , CBC   Recent Labs   Lab 10/17/20  0251 10/18/20  0522   WBC 0.22* 1.43*   HGB 9.7* 9.5*   HCT 29.6* 29.0*   PLT 69* 50*   , INR No results for input(s): INR, PROTIME in the last 48 hours., Lipid Panel No results for input(s): CHOL, HDL, LDLCALC, TRIG,  CHOLHDL in the last 48 hours. and Troponin No results for input(s): TROPONINI in the last 48 hours.    Significant Imaging: Reviewed  Assessment and Plan:     IMPRESSION:  PACs.  His EF was normal in July of 2020.  History of SVT status post ablation.  Septic shock currently on Levophed.  Severe proctocolitis secondary to C diff infection.  Chronic kidney disease with history of renal transplant on chronic immunosuppressive therapy.      RECOMMENDATIONS:  1.  I expect the PACs to improve once he is dehydration improves as well as Levophed is off. Patient would not need any further cardiac workup at this point in time.    2.  Maintain potassium greater than or equal 4.0 and magnesium greater than or equal to 2.0.  3.  Please re-consult if needed.  Thank you for your consult. I will sign off. Please contact us if you have any additional questions.    Erica Lozano MD  Cardiology   [facility]

## 2020-10-18 NOTE — SUBJECTIVE & OBJECTIVE
Interval History:  Diarrhea slowly improving.  Patient reports being compliant with vancomycin day yesterday.  White blood cell count improving.  He did have some new onset AFib overnight.  Continues to be on levo at same dose    Review of Systems   Constitutional: Positive for fatigue and fever.   Respiratory: Negative for cough and shortness of breath.    Cardiovascular: Negative for chest pain.   Gastrointestinal: Positive for abdominal pain and diarrhea.     Objective:     Vital Signs (Most Recent):  Temp: 98.9 °F (37.2 °C) (10/18/20 0800)  Pulse: 93 (10/18/20 0900)  Resp: (!) 21 (10/18/20 0900)  BP: 123/68 (10/18/20 0900)  SpO2: 100 % (10/18/20 0900) Vital Signs (24h Range):  Temp:  [97.9 °F (36.6 °C)-99.8 °F (37.7 °C)] 98.9 °F (37.2 °C)  Pulse:  [] 93  Resp:  [16-38] 21  SpO2:  [86 %-100 %] 100 %  BP: ()/(61-86) 123/68     Weight: 68.1 kg (150 lb 2.1 oz)  Body mass index is 20.94 kg/m².    Intake/Output Summary (Last 24 hours) at 10/18/2020 0949  Last data filed at 10/18/2020 0800  Gross per 24 hour   Intake 3677.91 ml   Output 1050 ml   Net 2627.91 ml      Physical Exam  Vitals signs reviewed.   Constitutional:       General: He is not in acute distress.     Appearance: He is ill-appearing. He is not diaphoretic.   HENT:      Mouth/Throat:      Mouth: Mucous membranes are dry.   Eyes:      General: No scleral icterus.        Right eye: No discharge.         Left eye: No discharge.   Neck:      Vascular: No JVD.   Cardiovascular:      Rate and Rhythm: Normal rate and regular rhythm.   Pulmonary:      Effort: Pulmonary effort is normal.      Breath sounds: Normal breath sounds.   Abdominal:      General: Bowel sounds are normal. There is no distension.      Palpations: Abdomen is soft.      Tenderness: There is abdominal tenderness in the left upper quadrant and left lower quadrant. There is no rebound.   Skin:     General: Skin is warm.      Findings: No rash.   Neurological:      Mental Status: He  is alert.         Significant Labs: All pertinent labs within the past 24 hours have been reviewed.    Significant Imaging: I have reviewed all pertinent imaging results/findings within the past 24 hours.

## 2020-10-18 NOTE — PROGRESS NOTES
"Consult Note  Infectious Disease    Reason for Consult:  C difficile colitis, neutropenia, sepsis    HPI: Alin Burkett is a   70 y.o. male who is status post renal transplant and is receiving chemotherapy for diffuse large B-cell lymphoma, presented to the emergency room on 10/15 with worsening of chronic diarrhea, abdominal cramps of 1 weeks duration.  He was found to be neutropenic with a white blood cell count of 0.2, volume depleted, hypokalemic, was cultured and given fluid resuscitation and vancomycin and cefepime.  He was admitted to the intensive care unit. He was recently given cefpodoxime to take prophylactically associated with his chemotherapy for recurring urinary tract infections.  Most recent positive urine culture was September 18th with E coli sensitive to 3rd generation cephalosporins carbapenems  He has had a hectic fever, stool for C difficile toxin was obtained and was resulted as positive today.  he has had a positive C difficile test before, August 2019.  White blood cells remain depressed at 0.1, platelets are depressed at 91,000, creatinine 1.6.  CT scan of the abdomen obtained last night shows severe proctocolitis without megacolon. Neupogen has been ordered. He is discouraged from eating by severe cramps.      10/17/2020 tmax is improved. Continues to be neutropenic Continues to have cramping, intermittent, abdominal pain. + diarrhea "lost count how many" He had refused vancomycin in am but decided to take it now  10/18/2020 afebrile,(103 on 16th)  ANC improved on granix 0---1029) he is feeling much better today.  Less abdominal pain.  Less loose stools.    Antibiotics (From admission, onward)    Start     Stop Route Frequency Ordered    10/17/20 0000  vancomycin 25 mg/mL oral solution 500 mg  (C. difficile Infection (CDI) Treatment Order Panel)      10/26 0559 Oral Every 6 hours 10/16/20 1755    10/16/20 1900  metronidazole IVPB 500 mg      -- IV Every 8 hours (non-standard times) " 10/16/20 1755    10/16/20 0400  cefepime in dextrose 5 % IVPB 2 g  (Med - Cefepime IVPB (Preferred if non-anaphylactic penicillin allergy))      -- IV Every 12 hours (non-standard times) 10/15/20 2220    10/15/20 222  vancomycin - pharmacy to dose  (vancomycin IVPB)      -- IV pharmacy to manage frequency 10/15/20 222        Antifungals (From admission, onward)    None        Antivirals (From admission, onward)    None            Review of patient's allergies indicates:  No Known Allergies  Past Medical History:   Diagnosis Date    Acidosis     Adrenal adenoma     Anemia associated with chronic renal failure     Arrhythmia, onset 2015    Awaiting organ transplant status 2013    Basal cell carcinoma 2012    left nasal tip    Blood type B+ 2013    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  Renal allograft biopsy 17 (DIVINE): 21 glomeruli, none globally sclerosed, <5% interstitial fibrosis, no ACR, c4d negative, AVR CCT Type 2 (V1 lesion); plan THYMO     Dissecting aortic aneurysm (any part), abdominal     Diverticulosis     Encounter for blood transfusion     ESRD (end stage renal disease) 2010    H/O urethral stricture 2018    H/O: urethral stricture     History of AAA (abdominal aortic aneurysm) repair     History of urethral stricture 2018    Hypertension     Hypokalemia     Hypothyroidism 1/10/2014    Inguinal hernia bilateral, non-recurrent     Kidney stones     Organ transplant candidate 2013    Plantar warts 1/10/2014    Recurrent UTI 2017    S/P kidney transplant     Secondary hyperparathyroidism, renal     Thyroid disease     EBV positive large B-cell lymphoma  diagnosed June 2020, consistent with post transplant lymphoproliferative disorder  Chemotherapy commenced 07/29/2020      EXAM & DIAGNOSTICS REVIEWED:   Vitals:     Temp:  [97.9 °F (36.6 °C)-99.8 °F (37.7 °C)]   Temp: 98.9 °F (37.2 °C) (10/18/20 0800)  Pulse: 100 (10/18/20 0800)  Resp: (!) 24 (10/18/20 0800)  BP: 111/69 (10/18/20 0800)  SpO2: 99 % (10/18/20 0800)    Intake/Output Summary (Last 24 hours) at 10/18/2020 0827  Last data filed at 10/18/2020 0600  Gross per 24 hour   Intake 3477.71 ml   Output 1050 ml   Net 2427.71 ml       General:  In NAD. Alert and attentive, cooperative, uncomfortable, chronically ill appearing  Eyes:  Anicteric, PERRL, EOMI  ENT:  No ulcers, exudates, thrush, nares patent, dentition  good  Neck:  supple, no masses or adenopathy appreciated  Lungs: Clear, no consolidation, rales, wheezes, rub  Heart:  RRR, no gallop/murmur/rub noted  Abd:  Mild distention, hypoactive BS, no masses or organomegaly appreciated. Tender throughout. Multiple scars from prior surgeries  :  Voids/  urine clear, no flank tenderness  Musc:  Joints without effusion, swelling, erythema, synovitis,    Skin:  No rashes. No palmar or plantar lesions. No subungual petechiae  Wound:   Neuro: Alert, attentive, speech fluent, face symmetric, moves all extremities, no focal weakness. Ambulatory  Psych:  Short tempered,  cooperative     Extrem: No edema, erythema, phlebitis, cellulitis, warm and well perfused  VAD:  portacath     Isolation:  Special contact    Lines/Tubes/Drains:    General Labs reviewed:  Recent Labs   Lab 10/16/20  0812 10/17/20  0251 10/18/20  0522   WBC 0.11* 0.22* 1.43*   HGB 9.6* 9.7* 9.5*   HCT 29.7* 29.6* 29.0*   PLT 91* 69* 50*       Recent Labs   Lab 10/15/20  1520  10/16/20  1828 10/17/20  0251 10/18/20  0522      < > 133* 133* 126*   K 2.6*   < > 4.1 3.9 3.7      < > 111* 109 106   CO2 17*   < > 16* 15* 12*   BUN 20   < > 19 18 22   CREATININE 1.4   < > 1.5* 1.4 1.7*   CALCIUM  7.9*   < > 7.3* 7.1* 7.5*   PROT 5.2*  --   --   --   --    BILITOT 0.7  --   --   --   --    ALKPHOS 65  --   --   --   --    ALT 14  --   --   --   --    AST 12  --   --   --   --     < > = values in this interval not displayed.     Micro:  Microbiology Results (last 7 days)     Procedure Component Value Units Date/Time    Blood culture x two cultures. Draw prior to antibiotics. [[id number]] Collected: 10/15/20 1626    Order Status: Completed Specimen: Blood from Peripheral, Right Hand Updated: 10/18/20 0613     Blood Culture, Routine No Growth to date      No Growth to date      No Growth to date    Narrative:      Aerobic and anaerobic    Blood culture x two cultures. Draw prior to antibiotics. [[id number]] Collected: 10/15/20 1546    Order Status: Completed Specimen: Blood from Antecubital, Right Arm Updated: 10/18/20 0613     Blood Culture, Routine No Growth to date      No Growth to date      No Growth to date    Narrative:      Aerobic and anaerobic    Clostridium difficile EIA [[id number]]  (Abnormal) Collected: 10/16/20 0358    Order Status: Completed Specimen: Stool Updated: 10/16/20 1057     C. diff Antigen Positive     C difficile Toxins A+B, EIA Positive     Comment: Testing not recommended for children <24 months old.           Imaging Reviewed:   KUB   CXR clear   CT abdomen and pelvis 10/16 :    Findings consistent with severe proctocolitis.    Transplanted kidney right side of the pelvis with mild pelvocaliectasis and perinephric stranding nonspecific.  No calcified stones seen Additional findings as detailed above including cystic lesion with in the native right kidney versus dilatation of collecting structure of the native right kidney.  Stones in the native right kidney.  Cystic lesion within the pancreas  Cardiology:    IMPRESSION & PLAN   1. Severe C. Difficile colitis   Prompted by oral antibiotics and/or chemotherapy   History of Cdiff 8/2019    2. PTLD, EBV related lymphoma, receiving  chemotherapy and neutropenia  3. S/p renal transplant on immunosuppression  4. Chronic diarrhea      Recommendations:  Cefepime for gram negative coverage.  I will probably discontinue it tomorrow or the day after  Discontinue granix tomorrow  IV flagyl and high dose oral vancomyin for Cdiff  Serial KUBs  If he worsens, would add IV tygacil  If he worsens, would consider dfficid

## 2020-10-18 NOTE — PROGRESS NOTES
"[facility] Medicine  Progress Note    Patient Name: Alin Burkett  MRN: [id number]  Patient Class: IP- Inpatient   Admission Date: 10/15/2020  Length of Stay: 3 days  Attending Physician: Peyman Rob,*  Primary Care Provider: Carmen Krueger MD        Subjective:     Principal Problem:Septic shock        HPI:  Patient has been having diarrhea with "jelly-like" consistency as well as crampiness in the abdomen.  Symptoms began roughly one week ago.  Also has had fever and malaise.  Appetite poor.  Fatigue as well.  He has been receiving chemo for PTLD; most recent cycle of CHOP was end of last week.  He has history of renal transplant four years ago and is currently on twice-a-day  Prograf.  He's had no cough, no unusual headache, no sinus pain, and no burning on urination.  He feels that he's probably dehydrated.    Overview/Hospital Course:  No notes on file    Interval History:  Diarrhea slowly improving.  Patient reports being compliant with vancomycin day yesterday.  White blood cell count improving.  He did have some new onset AFib overnight.  Continues to be on levo at same dose    Review of Systems   Constitutional: Positive for fatigue and fever.   Respiratory: Negative for cough and shortness of breath.    Cardiovascular: Negative for chest pain.   Gastrointestinal: Positive for abdominal pain and diarrhea.     Objective:     Vital Signs (Most Recent):  Temp: 98.9 °F (37.2 °C) (10/18/20 0800)  Pulse: 93 (10/18/20 0900)  Resp: (!) 21 (10/18/20 0900)  BP: 123/68 (10/18/20 0900)  SpO2: 100 % (10/18/20 0900) Vital Signs (24h Range):  Temp:  [97.9 °F (36.6 °C)-99.8 °F (37.7 °C)] 98.9 °F (37.2 °C)  Pulse:  [] 93  Resp:  [16-38] 21  SpO2:  [86 %-100 %] 100 %  BP: ()/(61-86) 123/68     Weight: 68.1 kg (150 lb 2.1 oz)  Body mass index is 20.94 kg/m².    Intake/Output Summary (Last 24 hours) at 10/18/2020 0949  Last data filed at 10/18/2020 0800  Gross per 24 hour "   Intake 3677.91 ml   Output 1050 ml   Net 2627.91 ml      Physical Exam  Vitals signs reviewed.   Constitutional:       General: He is not in acute distress.     Appearance: He is ill-appearing. He is not diaphoretic.   HENT:      Mouth/Throat:      Mouth: Mucous membranes are dry.   Eyes:      General: No scleral icterus.        Right eye: No discharge.         Left eye: No discharge.   Neck:      Vascular: No JVD.   Cardiovascular:      Rate and Rhythm: Normal rate and regular rhythm.   Pulmonary:      Effort: Pulmonary effort is normal.      Breath sounds: Normal breath sounds.   Abdominal:      General: Bowel sounds are normal. There is no distension.      Palpations: Abdomen is soft.      Tenderness: There is abdominal tenderness in the left upper quadrant and left lower quadrant. There is no rebound.   Skin:     General: Skin is warm.      Findings: No rash.   Neurological:      Mental Status: He is alert.         Significant Labs: All pertinent labs within the past 24 hours have been reviewed.    Significant Imaging: I have reviewed all pertinent imaging results/findings within the past 24 hours.      Assessment/Plan:      * Septic shock  Source is likely colitis from C.difficile.  UA does not appear infected.  Empiric antibiotcs: vancomycin and cefepime.  IV and oral vancomycin.  Continue norepinephrine drip.  Lactate level is ok.      Anemia due to chemotherapy  Patient's anemia is currently controlled. Has not received any PRBCs to date.. Etiology likely d/t chemo  Current CBC reviewed-   Lab Results   Component Value Date    HGB 9.6 (L) 10/16/2020    HCT 29.7 (L) 10/16/2020     Monitor serial CBC and transfuse if patient becomes hemodynamically unstable, symptomatic or H/H drops below 7/21.         Hypokalemia due to excessive gastrointestinal loss of potassium  Give supplement and monitor level.    Potassium   Date Value Ref Range Status   10/16/2020 4.1 3.5 - 5.1 mmol/L Final   10/16/2020 3.8 3.5 - 5.1  mmol/L Final         Febrile neutropenia  Will treat empirically with cefepime and vancomycin.  Monitor leukocyte count.  Consulting with hematologist for help.  Granix given today.;    WBC   Date Value Ref Range Status   10/16/2020 0.11 (LL) 3.90 - 12.70 K/uL Final     Comment:     critical result(s) called and verbal readback obtained from Pamela Fraire @0944  by [facility] 10/16/2020 09:45         Post-transplant lymphoproliferative disorder (PTLD)  Has been receiving chemo for this.      Stage 3 chronic kidney disease  Creatine stable for now. BMP reviewed- noted Estimated Creatinine Clearance: 44 mL/min (A) (based on SCr of 1.5 mg/dL (H)). according to latest data. Monitor UOP and serial BMP and adjust therapy as needed. Renally dose meds.      -donor kidney transplant  Continue Prograf at usual dose.      Acquired hypothyroidism  Continue levothyroxine.      VTE Risk Mitigation (From admission, onward)         Ordered     IP VTE LOW RISK PATIENT  Once      10/15/20 2220                Discharge Planning   TRINH:      Code Status: Full Code   Is the patient medically ready for discharge?:     Reason for patient still in hospital (select all that apply): Patient trending condition and Laboratory test  Discharge Plan A: Home with family            Critical care time spent on the evaluation and treatment of severe organ dysfunction, review of pertinent labs and imaging studies, discussions with consulting providers and discussions with patient/family: 30 minutes.      Peyman Rob MD  Department of Hospital Medicine   [facility]

## 2020-10-19 NOTE — PROGRESS NOTES
Alin Burkett [id number] is a 70 y.o. male who has been consulted for vancomycin dosing.    Pharmacy consult for vancomycin dosing in no longer required.  Vancomycin was discontinued.    Thank you for allowing us to participate in this patient's care.     -Woodrow Arnold, PharmD

## 2020-10-19 NOTE — ASSESSMENT & PLAN NOTE
Will treat empirically with cefepime and vancomycin.  Monitor leukocyte count.  Consulting with hematologist for help.  Granix given today.;    WBC   Date Value Ref Range Status   10/19/2020 5.46 3.90 - 12.70 K/uL Final

## 2020-10-19 NOTE — ASSESSMENT & PLAN NOTE
Creatine stable for now. BMP reviewed- noted Estimated Creatinine Clearance: 35.3 mL/min (A) (based on SCr of 1.9 mg/dL (H)). according to latest data. Monitor UOP and serial BMP and adjust therapy as needed. Renally dose meds.

## 2020-10-19 NOTE — PLAN OF CARE
Shift goals discussed with patient with time given for questions and answers. Medicated for pain X 1 at request, effective relief noted. No other needs noted, rests well.

## 2020-10-19 NOTE — ASSESSMENT & PLAN NOTE
Give supplement and monitor level.    Potassium   Date Value Ref Range Status   10/19/2020 3.7 3.5 - 5.1 mmol/L Final   10/18/2020 4.0 3.5 - 5.1 mmol/L Final

## 2020-10-19 NOTE — PROGRESS NOTES
[facility]  Patient Name: Alin Burkett  : 1950  Age: 70 y.o.  Sex: male  MRN: [id number]  Admission Date: 10/15/2020  Hospital Length of Stay: 4 days  Code Status: Full Code   Admitting Provider: Brennan Decker MD  Attending Provider: Julia Summers MD  Primary Care Physician: Carmen Krueger MD  Full Code  Subjective:     Date of Visit: 10/19/2020             Principal Problem: Septic shock    Patient ID: Alin Burkett is a 70 y.o. 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 ED workup showed pt was neutropenic with WBC 0.2 with fever. Pt was cultured and given fluid resuscitation and vancomycin and cefepime. Stool cultures positive for C. Difficile toxin. CT abd/pelvis without contrast revealed severe proctocolitis without megacolon. Pt remains pancytopenic with most recent WBC 0.11 with ANC 0.0 and H/H 9.6/29.7 and platelets 91. Pt seen at bedside with his wife and states he has chronic throbbing abdominal pain made worse by eating and has had chronic diarrhea. He states he is fatigued and has decreased appetite along with fever/chills and night sweats.    Interval History:  10/19/2020: Pt seen at bedside and appears cachectic. He endorses that he is still having diarrhea, but that it has improved and decreased in frequency and consistency has thickened. Pt states abdominal pain has improved, but it still present. He endorses fatigue. Pt denies hemoptysis, hematochezia, melena, hematuria.    Review of Systems   Constitutional: Positive for activity change, appetite change and diaphoresis. Negative for chills and fever.   HENT: Negative for mouth sores and trouble swallowing.    Eyes: Negative for photophobia and visual disturbance.   Respiratory: Negative for  cough, chest tightness, shortness of breath, wheezing and stridor.    Cardiovascular: Negative for chest pain and leg swelling.   Gastrointestinal: Positive for abdominal distention, abdominal pain and diarrhea. Negative for constipation, nausea and vomiting.   Musculoskeletal: Negative for arthralgias, back pain and myalgias.   Skin: Negative for color change, pallor, rash and wound.   Neurological: Negative for syncope, speech difficulty and weakness.   Hematological: Negative for adenopathy. Does not bruise/bleed easily.   Psychiatric/Behavioral: Negative for agitation, behavioral problems, confusion, decreased concentration and dysphoric mood.        ONCOLOGY HISTORY:     1. Monomorphic post-transplant lymphoproliferative disorder              A. 2/2020: Noticed left inguinal lymphadenopathy after a dog bite (failed to resolve with antibiotics)              B. 6/2/2020: Saw Dr. Collins in infectious diseases for inguinal lymphadenopathy - referred for biopsy              C. 6/30/2020: Core biopsy of L inguinal lymph node shows B-cell lymphoma of germinal center origin; EBV-positive by LONNIE; morphology nondiagnostic of PTLD vs follicular lymphoma              D. 7/8/2020: PET/CT shows left internal iliac chain node measuring 3.3 x 3 cm with SUV max 37; L inguinal node measures 3.2 x 2.4 cm with SUV max 36              E. 7/13/2020: Excisional biopsy of left inguinal node shows monomorphic post-transplant lymphoproliferative disorder (DLBCL, GCB 60%, follicular lymphoma, grade 3B 40%); FISH for MYC rearrangement is negative              F. 7/20/2020: Bone marrow biopsy shows no evidence of B-cell lymphoma                Past Medical History:   Diagnosis Date    Acidosis     Adrenal adenoma     Anemia associated with chronic renal failure     Arrhythmia, onset 1995 5/1/2015    Awaiting organ transplant status 11/26/2013    Basal cell carcinoma 06/12/2012    left nasal tip    Blood type B+ 11/26/2013     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  Renal allograft biopsy 17 (DIVINE): 21 glomeruli, none globally sclerosed, <5% interstitial fibrosis, no ACR, c4d negative, AVR CCT Type 2 (V1 lesion); plan THYMO     Dissecting aortic aneurysm (any part), abdominal     Diverticulosis     Encounter for blood transfusion     ESRD (end stage renal disease) 2010    H/O urethral stricture 2018    H/O: urethral stricture     History of AAA (abdominal aortic aneurysm) repair     History of urethral stricture 2018    Hypertension     Hypokalemia     Hypothyroidism 1/10/2014    Inguinal hernia bilateral, non-recurrent     Kidney stones     Organ transplant candidate 2013    Plantar warts 1/10/2014    Recurrent UTI 2017    S/P kidney transplant     Secondary hyperparathyroidism, renal     Thyroid disease        Family History   Problem Relation Age of Onset    Diabetes Mother     Alzheimer's disease Mother     Alcohol abuse Father     HIV Brother     Stroke Maternal Aunt     Kidney disease Paternal Uncle     Kidney disease Cousin     No Known Problems Sister     No Known Problems Daughter     No Known Problems Sister     No Known Problems Brother     No Known Problems Brother     Cancer Brother         thyroid cancer    Colon cancer Brother     Melanoma Neg Hx     Psoriasis Neg Hx     Lupus Neg Hx     Eczema Neg Hx     Colon polyps Neg Hx     Crohn's disease Neg Hx     Ulcerative colitis Neg Hx     Celiac disease Neg Hx        Past Surgical History:   Procedure Laterality Date    ABDOMINAL SURGERY      exploratory lapatomy x 2    ABLATION N/A 2019    Procedure: ABLATION, SVT;  Surgeon: Emerson CARLTON  MD [place];  Location: [facility] EP LAB;  Service: Cardiology;  Laterality: N/A;  SVT, RFA, CARTO, anes, GP, 323    AORTA - SUPERIOR MESENTERIC ARTERY BYPASS GRAFT      BLADDER NECK RECONSTRUCTION      BLADDER SURGERY      COLONOSCOPY  10/10/2013    Dr. Gutierrez, repeat in 5 years    CYSTOSCOPY      CYSTOSCOPY N/A 12/19/2018    Procedure: CYSTOSCOPY;  Surgeon: Dewey Mann MD;  [facility];  Service: Urology;  Laterality: N/A;  45 min    CYSTOURETHROSCOPY WITH DIRECT VISION INTERNAL URETHROTOMY N/A 12/19/2018    Procedure: CYSTOSCOPY, WITH DIRECT VISION INTERNAL URETHROTOMY;  Surgeon: Dewey Mann MD;  [facility];  Service: Urology;  Laterality: N/A;    DILATION OF URETHRA N/A 12/19/2018    Procedure: DILATION, URETHRA;  Surgeon: Dewey Mann MD;  [facility];  Service: Urology;  Laterality: N/A;    EXCISIONAL BIOPSY N/A 7/13/2020    Procedure: EXCISIONAL BIOPSY- LEFT INGUINAL NODE;  Surgeon: Vlad Epstein MD;  [address]  Service: General;  Laterality: N/A;    FLEXIBLE CYSTOSCOPY N/A 11/6/2019    Procedure: CYSTOSCOPY, FLEXIBLE;  Surgeon: Dewey Mann MD;  [facility];  Service: Urology;  Laterality: N/A;    GASTROJEJUNOSTOMY      HEMORRHOID SURGERY      HERNIA REPAIR      INSERTION OF VENOUS ACCESS PORT Left 7/27/2020    Procedure: INSERTION, VENOUS ACCESS PORT;  Surgeon: Vlad Epstein MD;  [address]  Service: General;  Laterality: Left;    KIDNEY TRANSPLANT      LEFT HEART CATHETERIZATION Left 8/20/2019    Procedure: Left heart cath;  Surgeon: Javan Oscar MD;  Location: [facility] CATH/EP LAB;  Service: Cardiology;  Laterality: Left;    LITHOTRIPSY      LYMPH NODE BIOPSY N/A 6/30/2020    Procedure: BIOPSY, LYMPH NODE;  Surgeon: Ella Diagnostic Provider;  [facility];  Service: General;  Laterality: N/A;  189 lymph node biopsy /ULTRASOUND    PERCUTANEOUS NEPHROLITHOTRIPSY       right  ESWL  10/31/12    right ESWL  6/26/12    URETHROPLASTY USING PATCH GRAFT N/A 11/6/2019    Procedure: URETHROPLASTY, USING PATCH GRAFT BUCCAL MUCOSA GRAFT;  Surgeon: Dewey Mann MD;  Location: [facility] OR [address] [place] [address];  Service: Urology;  Laterality: N/A;  3 HOURS       Social History     Socioeconomic History    Marital status: Single     Spouse name: Not on file    Number of children: Not on file    Years of education: Not on file    Highest education level: Not on file   Occupational History     Employer: Disabled   Social Needs    Financial resource strain: Not on file    Food insecurity     Worry: Not on file     Inability: Not on file    Transportation needs     Medical: Not on file     Non-medical: Not on file   Tobacco Use    Smoking status: Former Smoker     Packs/day: 0.50     Years: 40.00     Pack years: 20.00     Types: Cigarettes     Quit date: 6/16/2010     Years since quitting: 10.3    Smokeless tobacco: Never Used   Substance and Sexual Activity    Alcohol use: Yes     Alcohol/week: 3.0 standard drinks     Types: 3 Cans of beer per week     Comment: occasional/social    Drug use: No     Comment: THC in youth    Sexual activity: Yes     Partners: Female     Birth control/protection: None   Lifestyle    Physical activity     Days per week: Not on file     Minutes per session: Not on file    Stress: Not on file   Relationships    Social connections     Talks on phone: Not on file     Gets together: Not on file     Attends Catholic service: Not on file     Active member of club or organization: Not on file     Attends meetings of clubs or organizations: Not on file     Relationship status: Not on file   Other Topics Concern    Not on file   Social History Narrative    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       Current Facility-Administered Medications   Medication Dose Route Frequency Provider Last Rate Last Dose    0.9%  NaCl infusion   Intravenous Continuous  Peyman Rob  mL/hr at 10/18/20 1135      acetaminophen tablet 650 mg  650 mg Oral Q4H PRN Brennan Decker MD   650 mg at 10/16/20 1259    allopurinoL tablet 300 mg  300 mg Oral Daily Brennan Decker MD   300 mg at 10/18/20 0916    aspirin EC tablet 81 mg  81 mg Oral Daily Brennan Decker MD   81 mg at 10/18/20 0916    brimonidine-timoloL 0.2-0.5 % ophthalmic solution 1 drop  1 drop Both Eyes Q12H Brennan Decker MD   1 drop at 10/18/20 2135    calcitRIOL capsule 0.5 mcg  0.5 mcg Oral Daily Brennan Decker MD   0.5 mcg at 10/18/20 0916    cefepime in dextrose 5 % IVPB 2 g  2 g Intravenous Q12H Brennan Decker MD   2 g at 10/19/20 0447    dicyclomine capsule 10 mg  10 mg Oral QID Lola Tolbert MD   10 mg at 10/18/20 2120    famotidine tablet 20 mg  20 mg Oral QHS Brennan Decker MD   20 mg at 10/18/20 2120    hyoscyamine ODT 0.125 mg  0.125 mg Sublingual Q4H PRN Lola Tolbert MD   0.125 mg at 10/17/20 1604    levothyroxine tablet 100 mcg  100 mcg Oral Before breakfast Brennan Decker MD   100 mcg at 10/19/20 0614    magnesium sulfate 2g in water 50mL IVPB (premix)  2 g Intravenous PRN Brennan Decker MD        magnesium sulfate 2g in water 50mL IVPB (premix)  4 g Intravenous PRN Brennan Decker MD        metoclopramide HCl injection 10 mg  10 mg Intravenous Q6H PRN Brennan Decker MD        metronidazole IVPB 500 mg  500 mg Intravenous Q8H Lola Tolbert  mL/hr at 10/19/20 0400 500 mg at 10/19/20 0400    morphine injection 4 mg  4 mg Intravenous Q3H PRN Brennan Decker MD   4 mg at 10/17/20 1831    morphine injection 8 mg  8 mg Intravenous Q3H PRN Brennan Decker MD   8 mg at 10/19/20 0137    NORepinephrine bitartrate 8 mg in dextrose 5% 250 mL infusion  0.02 mcg/kg/min Intravenous Continuous Brennan Decker MD 12.8 mL/hr at 10/19/20 0809 0.1 mcg/kg/min at 10/19/20 0809    ondansetron disintegrating tablet 8 mg  8 mg Oral Q6H PRN  Brennan Decker MD        potassium chloride 10 mEq in 100 mL IVPB  40 mEq Intravenous PRN Brennan Decker  mL/hr at 10/19/20 0723 40 mEq at 10/19/20 0723    And    potassium chloride 10 mEq in 100 mL IVPB  60 mEq Intravenous PRN Brennan Decker MD        And    potassium chloride 10 mEq in 100 mL IVPB  80 mEq Intravenous PRN Brennan Decker MD        promethazine tablet 25 mg  25 mg Oral Q6H PRN Brennan Decker MD        sodium chloride 0.9% flush 10 mL  10 mL Intravenous PRN Brennan Decker MD        tacrolimus capsule 2 mg  2 mg Oral Daily PM Brennan Decker MD   2 mg at 10/18/20 1732    tacrolimus capsule 3 mg  3 mg Oral Daily AM Brennan Decker MD   3 mg at 10/19/20 0722    tbo-filgrastim (GRANIX) injection 300 mcg/0.5 mL  300 mcg Subcutaneous Daily Alexi Camp PA-C   300 mcg at 10/18/20 0915    travoprost 0.004 % ophthalmic solution 1 drop  1 drop Both Eyes QHS Brennan Decker MD   1 drop at 10/18/20 2135    vancomycin - pharmacy to dose   Intravenous pharmacy to manage frequency Brennan Decker MD        vancomycin 25 mg/mL oral solution 500 mg  500 mg Oral Q6H Lola Tolbert MD   500 mg at 10/19/20 0614        allopurinoL  300 mg Oral Daily    aspirin  81 mg Oral Daily    brimonidine-timoloL  1 drop Both Eyes Q12H    calcitRIOL  0.5 mcg Oral Daily    ceFEPime (MAXIPIME) IVPB  2 g Intravenous Q12H    dicyclomine  10 mg Oral QID    famotidine  20 mg Oral QHS    levothyroxine  100 mcg Oral Before breakfast    metronidazole  500 mg Intravenous Q8H    tacrolimus  2 mg Oral Daily PM    tacrolimus  3 mg Oral Daily AM    tbo-filgrastim  300 mcg Subcutaneous Daily    travoprost  1 drop Both Eyes QHS    vancomycin  500 mg Oral Q6H        sodium chloride 0.9% 125 mL/hr at 10/18/20 1135    norepinephrine bitartrate-D5W 0.1 mcg/kg/min (10/19/20 0809)       acetaminophen, hyoscyamine, magnesium sulfate IVPB, magnesium sulfate IVPB, metoclopramide HCl,  morphine, morphine, ondansetron, potassium chloride in water **AND** potassium chloride in water **AND** potassium chloride in water, promethazine, sodium chloride 0.9%, Pharmacy to dose Vancomycin consult **AND** vancomycin - pharmacy to dose    Antibiotics (From admission, onward)    Start     Stop Route Frequency Ordered    10/17/20 0000  vancomycin 25 mg/mL oral solution 500 mg  (C. difficile Infection (CDI) Treatment Order Panel)      10/26 0559 Oral Every 6 hours 10/16/20 1755    10/16/20 1900  metronidazole IVPB 500 mg      -- IV Every 8 hours (non-standard times) 10/16/20 1755    10/16/20 0400  cefepime in dextrose 5 % IVPB 2 g  (Med - Cefepime IVPB (Preferred if non-anaphylactic penicillin allergy))      -- IV Every 12 hours (non-standard times) 10/15/20 2220    10/15/20 2220  vancomycin - pharmacy to dose  (vancomycin IVPB)      -- IV pharmacy to manage frequency 10/15/20 2220          Review of patient's allergies indicates:  No Known Allergies  All medications and past history have been reviewed.    Objective:      Vitals:  Patient Vitals for the past 24 hrs:   BP Temp Temp src Pulse Resp SpO2   10/19/20 0730 119/68 -- -- 98 (!) 25 99 %   10/19/20 0715 121/70 98.4 °F (36.9 °C) Oral 106 (!) 22 100 %   10/19/20 0700 128/69 -- -- 99 17 99 %   10/19/20 0645 119/66 -- -- 102 16 99 %   10/19/20 0600 -- -- -- 99 14 99 %   10/19/20 0500 -- -- -- 98 13 100 %   10/19/20 0400 128/70 98.9 °F (37.2 °C) Skin 99 18 99 %   10/19/20 0300 127/84 -- -- 105 19 99 %   10/19/20 0200 120/77 -- -- (!) 113 (!) 22 100 %   10/19/20 0137 -- -- -- -- (!) 24 --   10/19/20 0100 -- -- -- 96 (!) 24 99 %   10/19/20 0000 (!) 140/81 97.7 °F (36.5 °C) Skin 105 (!) 22 100 %   10/18/20 2300 128/81 -- -- 100 (!) 23 100 %   10/18/20 2200 114/79 -- -- (!) 113 (!) 28 97 %   10/18/20 2100 125/82 -- -- 105 20 99 %   10/18/20 2000 136/86 97.9 °F (36.6 °C) Skin 103 18 99 %   10/18/20 1900 (!) 137/95 -- -- 104 (!) 25 99 %   10/18/20 1800 119/81 -- --  97 (!) 24 100 %   10/18/20 1700 114/73 -- -- 103 (!) 22 98 %   10/18/20 1600 129/83 99 °F (37.2 °C) Oral 105 18 100 %   10/18/20 1500 116/79 -- -- 90 (!) 24 98 %   10/18/20 1400 109/74 -- -- 91 20 99 %   10/18/20 1300 92/64 -- -- 94 20 100 %   10/18/20 1200 111/64 98.6 °F (37 °C) Oral 97 (!) 26 (!) 94 %   10/18/20 1100 99/64 -- -- 86 17 100 %   10/18/20 1000 122/73 -- -- 94 19 100 %   10/18/20 0900 123/68 -- -- 93 (!) 21 100 %      Body mass index is 20.94 kg/m².  Body surface area is 1.85 meters squared.    Last 24 Hours:    Intake/Output Summary (Last 24 hours) at 10/19/2020 0826  Last data filed at 10/19/2020 0606  Gross per 24 hour   Intake 2628.16 ml   Output 1075 ml   Net 1553.16 ml     Weight Readings:  Wt Readings from Last 5 Encounters:   10/17/20 68.1 kg (150 lb 2.1 oz)   10/10/20 68.5 kg (151 lb)   10/09/20 66.4 kg (146 lb 6.2 oz)   10/02/20 65.8 kg (145 lb)   09/11/20 68.6 kg (151 lb 2 oz)      Blood Type:  B POS     Physical Exam  Constitutional:       Appearance: He is ill-appearing.   HENT:      Head: Normocephalic and atraumatic.      Right Ear: External ear normal.      Left Ear: External ear normal.      Nose: Nose normal. No congestion or rhinorrhea.   Eyes:      General:         Right eye: No discharge.         Left eye: No discharge.      Extraocular Movements: Extraocular movements intact.      Conjunctiva/sclera: Conjunctivae normal.      Pupils: Pupils are equal, round, and reactive to light.   Neck:      Musculoskeletal: Normal range of motion and neck supple. No neck rigidity.   Cardiovascular:      Rate and Rhythm: Normal rate and regular rhythm.      Heart sounds: Normal heart sounds. No murmur.   Pulmonary:      Effort: Pulmonary effort is normal. No respiratory distress.      Breath sounds: Normal breath sounds. No stridor. No wheezing, rhonchi or rales.   Abdominal:      General: Bowel sounds are normal. There is distension.      Palpations: Abdomen is soft.      Tenderness: There is  abdominal tenderness (LLQ). There is no guarding.   Musculoskeletal: Normal range of motion.         General: No deformity.      Right lower leg: No edema.      Left lower leg: No edema.   Lymphadenopathy:      Cervical: No cervical adenopathy.   Skin:     General: Skin is warm and dry.      Coloration: Skin is not pale.      Findings: Bruising (bialteral UE and chest) present. No erythema or rash.   Neurological:      General: No focal deficit present.      Mental Status: He is alert and oriented to person, place, and time. Mental status is at baseline.      Cranial Nerves: No cranial nerve deficit.   Psychiatric:         Mood and Affect: Mood normal.         Behavior: Behavior normal.         Thought Content: Thought content normal.         Judgment: Judgment normal.         Labs:  Recent Labs   Lab 10/17/20  0251 10/18/20  0522 10/19/20  0316   WBC 0.22* 1.43* 5.46   RBC 3.09* 3.03* 3.22*   HGB 9.7* 9.5* 10.3*   HCT 29.6* 29.0* 30.4*   PLT 69* 50* 43*   MCV 96 96 94     Recent Labs   Lab 10/15/20  1520  10/18/20  0522 10/18/20  0927 10/19/20  0316      < > 126* 129* 130*   K 2.6*   < > 3.7 4.0 3.7      < > 106 108 110   CO2 17*   < > 12* 12* 12*   BUN 20   < > 22 25* 30*   CREATININE 1.4   < > 1.7* 1.7* 1.9*      < > 371* 148* 87   CALCIUM 7.9*   < > 7.5* 7.8* 7.6*   MG 2.3  --   --   --   --    PHOS 2.0*  --   --   --   --    ALKPHOS 65  --   --   --   --    PROT 5.2*  --   --   --   --    ALBUMIN 2.4*  --   --   --   --    BILITOT 0.7  --   --   --   --    AST 12  --   --   --   --    ALT 14  --   --   --   --     < > = values in this interval not displayed.       Imaging:  Results for orders placed or performed during the hospital encounter of 10/15/20 (from the past 2160 hour(s))   CT Abdomen Pelvis  Without Contrast    Impression    Findings consistent with severe proctocolitis.    Transplanted kidney right side of the pelvis with mild pelvocaliectasis and perinephric stranding  nonspecific.  No calcified stones seen    Additional findings as detailed above including cystic lesion with in the native right kidney versus dilatation of collecting structure of the native right kidney.  Stones in the native right kidney.  Cystic lesion within the pancreas.    Final read    Virtual Radiology concordant      Electronically signed by: Ada Castillo MD  Date:    10/16/2020  Time:    08:56   Results for orders placed or performed during the hospital encounter of 08/06/20 (from the past 2160 hour(s))   CT Chest Abdomen Pelvis Without Contrast (XPD)    Impression    No acute abdominal pathology identified.    Nonvisualization of the left kidney with malrotation of the right kidney and multiple nonobstructing renal stones.  No hydronephrosis.    Right pelvic renal allograft with no evidence for renal allograft focal lesion, nephrolithiasis, or hydronephrosis.    Stable mild ectasia of the infrarenal abdominal aorta measuring up to 2.6 cm without evidence for aortic aneurysm or other acute aortic pathology.  Atherosclerosis identified.    Multiple enlarged lymph nodes in the left inguinal region, with interval dissection of multiple left inguinal and pelvic lymph nodes when compared to nuclear medicine PET-CT 07/08/2020.  Correlate with biopsy results.    Stable cystic appearing lesion in the pancreatic head measuring 1.2 cm.    Other findings as above.    Electronically signed by resident: Mati Cárdenas  Date:    08/06/2020  Time:    09:23    Electronically signed by: Jayson Staples MD  Date:    08/06/2020  Time:    10:34     *Note: Due to a large number of results and/or encounters for the requested time period, some results have not been displayed. A complete set of results can be found in Results Review.     PET CT 07/08/2020  FINDINGS:  Quality of the study: Adequate.     In the neck, there is no abnormal hypermetabolic activity worrisome for malignancy.  Vascular stent identified in the left  axillary region.  No significant lymphadenopathy.     In the chest, there is no abnormal hypermetabolic activity worsened malignancy.  Coronary atherosclerosis.  0.4 cm pulmonary nodule identified in the left upper lobe (axial series 3, image 67), lesion is too small to characterize with PET-CT. Recommend attenuation expected follow-up examinations. No significant lymphadenopathy.     In the abdomen and pelvis, there is hypermetabolic lymphadenopathy throughout the left internal/external iliac chain and left groin.  New left internal iliac chain node measures 3.3 x 3 0 cm with SUV max of 37 (axial fused image 193).  Left inguinal node measures approximately 3.2 x 2.4 cm with SUV max of 36 (axial fused image 218), previously measured up to 1.4 cm.  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  The right kidney appears atrophic with abnormal contour parenchymal calcifications, unchanged.  Right lower quadrant transplant kidney in place.  Stable small bladder diverticulum.  Stable 1.2 cm pancreatic cyst, better characterized on most recent CT with IV contrast.  Stable bilateral probable adrenal adenomas.  Colonic diverticulosis without evidence of acute diverticulitis.  Stable fusiform abdominal aortic ectasia.     Spleen appears upper limit of normal for size measuring 12.0 cm in craniocaudal dimension.  Normal heterogeneous uptake similar to liver.     In the bones, there is no abnormal activity worrisome for malignancy.  Additional focus of increased uptake identified within the myofascial structures of the thigh, just deep to the left femur with SUV max of 27 (axial fused image 269).     Impression:     Hypermetabolic lymphadenopathy throughout the left iliac chain and left groin with additional hypermetabolic focus in the left thigh.  No hypermetabolic juan david disease above the diaphragm.  The Deauville score is 5.    All lab results and imaging results have been reviewed.    Assessment and Plan:      Present on  Admission:   Febrile neutropenia   Septic shock   Acquired hypothyroidism   Post-transplant lymphoproliferative disorder (PTLD)   Stage 3 chronic kidney disease   Hypokalemia due to excessive gastrointestinal loss of potassium   Anemia due to chemotherapy    Post-transplant lymphoproliferative disorder (PTLD)  -Cycle 4 RCHOP completed on 10/09/2020. Pt reaching corina time-frame s/p RCHOP infusion. Please have patient follow up with Primary Oncologist Dr. Luis Fernando Lopez within 72 hours of discharge.     C-difficile colitis  -Follow ID recommendations.     Pancytopenia   -Secondary to chemotherapy and disease.  -Neutropenia resolved. Pt given Granix 300mcg daily x 3 doses and ANC WNL. Pt has not had fever in over 24 hours.   -Pt H/H improving to 10.3/30.4. Pt serum iron <10 and ferritin elevated. Pt in septic shock with decreased renal function. Ferritin is an acute phase reactant that can be elevated due to inflammation. Orders placed for ferous sulfate BID for iron supplementation. We will hold on IV iron for now to avoid overloading the patient.   -Platelets are 43. Continue to monitor. Transfuse platelets PRN if platelet count <20,000 or if pt actively bleeding.  -Continue abx regimen for C. Difficile.    Sincerely,  Alexi Camp PA-C    Note is available for collaborating MD; Dr. Cathy Stafford for review.    Electronically signed by: Alexi Camp PA-C

## 2020-10-19 NOTE — CARE UPDATE
10/19/20 0894   Patient Assessment/Suction   Level of Consciousness (AVPU) alert   PRE-TX-O2   O2 Device (Oxygen Therapy) room air   SpO2 98 %   Pulse Oximetry Type Continuous   $ Pulse Oximetry - Multiple Charge Pulse Oximetry - Multiple   Pulse 102   Resp (!) 22

## 2020-10-19 NOTE — ASSESSMENT & PLAN NOTE
Patient's anemia is currently controlled. Has not received any PRBCs to date.. Etiology likely d/t chemo  Current CBC reviewed-   Lab Results   Component Value Date    HGB 10.3 (L) 10/19/2020    HCT 30.4 (L) 10/19/2020     Monitor serial CBC and transfuse if patient becomes hemodynamically unstable, symptomatic or H/H drops below 7/21.

## 2020-10-19 NOTE — PROGRESS NOTES
Pharmacokinetic Assessment Follow Up: IV Vancomycin    Vancomycin serum concentration assessment(s):    The random level was drawn correctly and can be used to guide therapy at this time. The measurement is within the desired definitive target range of 15 to 20 mcg/mL.    Vancomycin Regimen Plan:    Repeat Vancomycin 500 mg x 1 and Re-dose when the random level is less than 20 mcg/mL, next level to be drawn at 0500 on 10/20    Drug levels (last 3 results):  Recent Labs   Lab Result Units 10/17/20  1438 10/18/20  0927 10/19/20  0316   Vancomycin, Random ug/mL 12.9 18.1 19.2       Pharmacy will continue to follow and monitor vancomycin.    Please contact pharmacy at extension [phone] for questions regarding this assessment.    Thank you for the consult,   Catherine Yao       Patient brief summary:  Alin Burkett is a 70 y.o. male initiated on antimicrobial therapy with IV Vancomycin for treatment of neutropenic fever    The patient's current regimen is pulse dosing. Last dose was 500 mg.    Drug Allergies:   Review of patient's allergies indicates:  No Known Allergies    Actual Body Weight:   68.1 kg    Renal Function:   Estimated Creatinine Clearance: 35.3 mL/min (A) (based on SCr of 1.9 mg/dL (H)).,         CBC (last 72 hours):  Recent Labs   Lab Result Units 10/16/20  0812 10/17/20  0251 10/18/20  0522 10/19/20  0316   WBC K/uL 0.11* 0.22* 1.43* 5.46   Hemoglobin g/dL 9.6* 9.7* 9.5* 10.3*   Hematocrit % 29.7* 29.6* 29.0* 30.4*   Platelets K/uL 91* 69* 50* 43*   Gran% % 9.0* 32.0* 72.0  --    Lymph% % 36.4 56.0* 16.0*  --    Mono% % 45.5* 8.0 8.0  --    Eosinophil% % 9.1* 4.0 4.0  --    Basophil% % 0.0 0.0 0.0  --    Differential Method  Automated Manual Manual  --        Metabolic Panel (last 72 hours):  Recent Labs   Lab Result Units 10/16/20  0812 10/16/20  1828 10/17/20  0251 10/18/20  0522 10/18/20  0927 10/19/20  0316   Sodium mmol/L 137 133* 133* 126* 129* 130*   Potassium mmol/L 3.8 4.1 3.9 3.7 4.0 3.7    Chloride mmol/L 111* 111* 109 106 108 110   CO2 mmol/L 19* 16* 15* 12* 12* 12*   Glucose mg/dL 96 98 101 371* 148* 87   BUN, Bld mg/dL 19 19 18 22 25* 30*   Creatinine mg/dL 1.6* 1.5* 1.4 1.7* 1.7* 1.9*       Vancomycin Administrations:  vancomycin given in the last 96 hours                     vancomycin 25 mg/mL oral solution 500 mg (mg) 500 mg Given 10/19/20 0100     500 mg Given 10/18/20 1732     500 mg Given  1149     500 mg Given  0636     500 mg Given  0040     500 mg Given 10/17/20 1718     500 mg Given  1256    vancomycin 500 mg in dextrose 5 % 100 mL IVPB (ready to mix system) (mg) 500 mg New Bag 10/18/20 1149    vancomycin in dextrose 5 % 1 gram/250 mL IVPB 1,000 mg (mg) 1,000 mg New Bag 10/17/20 1718    vancomycin in dextrose 5 % 1 gram/250 mL IVPB 1,000 mg (mg) 1,000 mg New Bag 10/16/20 2119    vancomycin 25 mg/mL oral solution 125 mg (mg) 125 mg Given 10/16/20 1729     125 mg Given  1112     125 mg Given  0549    vancomycin in dextrose 5 % 1 gram/250 mL IVPB 1,000 mg (mg) 1,000 mg New Bag 10/16/20 0059                    Microbiologic Results:  Microbiology Results (last 7 days)       Procedure Component Value Units Date/Time    Blood culture x two cultures. Draw prior to antibiotics. [[id number]] Collected: 10/15/20 1626    Order Status: Completed Specimen: Blood from Peripheral, Right Hand Updated: 10/18/20 0613     Blood Culture, Routine No Growth to date      No Growth to date      No Growth to date    Narrative:      Aerobic and anaerobic    Blood culture x two cultures. Draw prior to antibiotics. [[id number]] Collected: 10/15/20 1546    Order Status: Completed Specimen: Blood from Antecubital, Right Arm Updated: 10/18/20 0613     Blood Culture, Routine No Growth to date      No Growth to date      No Growth to date    Narrative:      Aerobic and anaerobic    Clostridium difficile EIA [[id number]]  (Abnormal) Collected: 10/16/20 0358    Order Status: Completed Specimen: Stool Updated: 10/16/20 1057      C. diff Antigen Positive     C difficile Toxins A+B, EIA Positive     Comment: Testing not recommended for children <24 months old.

## 2020-10-19 NOTE — PLAN OF CARE
Pt afebrile this am. C/o left shoulder pain of 5/10 but refuses any more morphine at present. Refuses breakfast at this time. Decreased lung sounds to LLL and MILLICENT. Right side clear. Abdomen soft with gassy sounds. titration down on norepi. To keep SBP > 100.

## 2020-10-19 NOTE — PROGRESS NOTES
"Progress Note  Infectious Disease    Reason for Consult:  C difficile colitis, neutropenia, sepsis    HPI: Alin Burkett is a   70 y.o. male who is status post renal transplant and is receiving chemotherapy for diffuse large B-cell lymphoma, presented to the emergency room on 10/15 with worsening of chronic diarrhea, abdominal cramps of 1 weeks duration.  He was found to be neutropenic with a white blood cell count of 0.2, volume depleted, hypokalemic, was cultured and given fluid resuscitation and vancomycin and cefepime.  He was admitted to the intensive care unit. He was recently given cefpodoxime to take prophylactically associated with his chemotherapy for recurring urinary tract infections.  Most recent positive urine culture was September 18th with E coli sensitive to 3rd generation cephalosporins carbapenems  He has had a hectic fever, stool for C difficile toxin was obtained and was resulted as positive today.  he has had a positive C difficile test before, August 2019.  White blood cells remain depressed at 0.1, platelets are depressed at 91,000, creatinine 1.6.  CT scan of the abdomen obtained last night shows severe proctocolitis without megacolon. Neupogen has been ordered. He is discouraged from eating by severe cramps.      10/17/2020 tmax is improved. Continues to be neutropenic Continues to have cramping, intermittent, abdominal pain. + diarrhea "lost count how many" He had refused vancomycin in am but decided to take it now  10/18/2020 afebrile,(103 on 16th)  ANC improved on granix 0---1029) he is feeling much better today.  Less abdominal pain.  Less loose stools.  10/19/2020.  Afebrile.  T-max 99°.  Less need for pressors.  WBC 5.  Creatinine slightly worse at 1.9.  Discussed with nurse.  Patient may have had some hallucinations earlier  Antibiotics (From admission, onward)    Start     Stop Route Frequency Ordered    10/17/20 0000  vancomycin 25 mg/mL oral solution 500 mg  (C. difficile " Infection (CDI) Treatment Order Panel)      10/26 0559 Oral Every 6 hours 10/16/20 1755    10/16/20 1900  metronidazole IVPB 500 mg      -- IV Every 8 hours (non-standard times) 10/16/20 1755    10/16/20 0400  cefepime in dextrose 5 % IVPB 2 g  (Med - Cefepime IVPB (Preferred if non-anaphylactic penicillin allergy))      -- IV Every 12 hours (non-standard times) 10/15/20 2220    10/15/20 2220  vancomycin - pharmacy to dose  (vancomycin IVPB)      -- IV pharmacy to manage frequency 10/15/20 2220        Antifungals (From admission, onward)    None        Antivirals (From admission, onward)    None          EXAM & DIAGNOSTICS REVIEWED:   Vitals:     Temp:  [97.7 °F (36.5 °C)-99 °F (37.2 °C)]   Temp: 97.9 °F (36.6 °C) (10/19/20 1115)  Pulse: 99 (10/19/20 1115)  Resp: (!) 22 (10/19/20 1115)  BP: 104/70 (10/19/20 1115)  SpO2: 99 % (10/19/20 1115)    Intake/Output Summary (Last 24 hours) at 10/19/2020 1130  Last data filed at 10/19/2020 1121  Gross per 24 hour   Intake 2996.6 ml   Output 1225 ml   Net 1771.6 ml       General:  In NAD. Alert and attentive, cooperative, uncomfortable, chronically ill appearing.    Eyes:  Anicteric, PERRL, EOMI  ENT:  No ulcers, exudates, thrush, nares patent, dentition  good  Neck:  supple, no masses or adenopathy appreciated  Lungs: Clear, no consolidation, rales, wheezes, rub  Heart:  RRR, no gallop/murmur/rub noted  Abd:  Mild distention, hypoactive BS, no masses or organomegaly appreciated. Tender throughout. Multiple scars from prior surgeries  :  Voids/  urine clear, no flank tenderness  Musc:  Joints without effusion, swelling, erythema, synovitis,    Skin:  No rashes. No palmar or plantar lesions. No subungual petechiae  Wound:   Neuro: Alert, attentive, speech fluent, face symmetric, moves all extremities, no focal weakness. Ambulatory  Psych:  Short tempered,  cooperative     Extrem: No edema, erythema, phlebitis, cellulitis, warm and well perfused  VAD:  portacath     Isolation:   Special contact    Lines/Tubes/Drains:    General Labs reviewed:  Recent Labs   Lab 10/17/20  0251 10/18/20  0522 10/19/20  0316   WBC 0.22* 1.43* 5.46   HGB 9.7* 9.5* 10.3*   HCT 29.6* 29.0* 30.4*   PLT 69* 50* 43*       Recent Labs   Lab 10/15/20  1520  10/18/20  0522 10/18/20  0927 10/19/20  0316      < > 126* 129* 130*   K 2.6*   < > 3.7 4.0 3.7      < > 106 108 110   CO2 17*   < > 12* 12* 12*   BUN 20   < > 22 25* 30*   CREATININE 1.4   < > 1.7* 1.7* 1.9*   CALCIUM 7.9*   < > 7.5* 7.8* 7.6*   PROT 5.2*  --   --   --   --    BILITOT 0.7  --   --   --   --    ALKPHOS 65  --   --   --   --    ALT 14  --   --   --   --    AST 12  --   --   --   --     < > = values in this interval not displayed.     Micro:  Microbiology Results (last 7 days)     Procedure Component Value Units Date/Time    Blood culture x two cultures. Draw prior to antibiotics. [[id number]] Collected: 10/15/20 1546    Order Status: Completed Specimen: Blood from Antecubital, Right Arm Updated: 10/19/20 0612     Blood Culture, Routine No Growth to date      No Growth to date      No Growth to date      No Growth to date    Narrative:      Aerobic and anaerobic    Blood culture x two cultures. Draw prior to antibiotics. [[id number]] Collected: 10/15/20 1626    Order Status: Completed Specimen: Blood from Peripheral, Right Hand Updated: 10/19/20 0612     Blood Culture, Routine No Growth to date      No Growth to date      No Growth to date      No Growth to date    Narrative:      Aerobic and anaerobic    Clostridium difficile EIA [[id number]]  (Abnormal) Collected: 10/16/20 0791    Order Status: Completed Specimen: Stool Updated: 10/16/20 1057     C. diff Antigen Positive     C difficile Toxins A+B, EIA Positive     Comment: Testing not recommended for children <24 months old.           Imaging Reviewed:   KUB   CXR clear   CT abdomen and pelvis 10/16 :    Findings consistent with severe proctocolitis.    Transplanted kidney right side of  the pelvis with mild pelvocaliectasis and perinephric stranding nonspecific.  No calcified stones seen Additional findings as detailed above including cystic lesion with in the native right kidney versus dilatation of collecting structure of the native right kidney.  Stones in the native right kidney.  Cystic lesion within the pancreas  Cardiology:    IMPRESSION & PLAN   1. Severe C. Difficile colitis   Prompted by oral antibiotics and/or chemotherapy   History of Cdiff 8/2019    2. PTLD, EBV related lymphoma, receiving chemotherapy and neutropenia  3. S/p renal transplant on immunosuppression  4. Chronic diarrhea      Recommendations:  Can discontinue vancomycin IV, cefepime, Granix.  IV flagyl and high dose oral vancomyin for Cdiff  If he worsens, would add IV tygacil  If he worsens, would consider dfficid

## 2020-10-19 NOTE — SUBJECTIVE & OBJECTIVE
Interval History:  Diarrhea improved.  White count improved H&H stable.  Continues to have persistent thrombocytopenia. Still lethargic, was at baseline mental status during my eval during the day but became more confused ans started hallucinating later towards end of the day    Review of Systems   Constitutional: Positive for chills, fatigue and fever.   Respiratory: Negative for cough and shortness of breath.    Cardiovascular: Negative for chest pain.   Gastrointestinal: Positive for abdominal pain and diarrhea.     Objective:     Vital Signs (Most Recent):  Temp: 98.4 °F (36.9 °C) (10/19/20 0715)  Pulse: 98 (10/19/20 0730)  Resp: (!) 25 (10/19/20 0730)  BP: 119/68 (10/19/20 0730)  SpO2: 99 % (10/19/20 0730) Vital Signs (24h Range):  Temp:  [97.7 °F (36.5 °C)-99 °F (37.2 °C)] 98.4 °F (36.9 °C)  Pulse:  [] 98  Resp:  [13-28] 25  SpO2:  [94 %-100 %] 99 %  BP: ()/(64-95) 119/68     Weight: 68.1 kg (150 lb 2.1 oz)  Body mass index is 20.94 kg/m².    Intake/Output Summary (Last 24 hours) at 10/19/2020 0827  Last data filed at 10/19/2020 0606  Gross per 24 hour   Intake 2628.16 ml   Output 1075 ml   Net 1553.16 ml      Physical Exam  Vitals signs reviewed.   Constitutional:       General: He is not in acute distress.     Appearance: He is ill-appearing. He is not diaphoretic.   HENT:      Mouth/Throat:      Mouth: Mucous membranes are dry.   Eyes:      General: No scleral icterus.        Right eye: No discharge.         Left eye: No discharge.   Neck:      Vascular: No JVD.   Cardiovascular:      Rate and Rhythm: Normal rate and regular rhythm.   Pulmonary:      Effort: Pulmonary effort is normal.      Breath sounds: Normal breath sounds.   Abdominal:      General: Bowel sounds are normal. There is no distension.      Palpations: Abdomen is soft.      Tenderness: There is abdominal tenderness in the left upper quadrant and left lower quadrant. There is no rebound.   Skin:     General: Skin is warm.       Findings: No rash.   Neurological:      Mental Status: He is alert.         Significant Labs:   BMP:   Recent Labs   Lab 10/19/20  0316   GLU 87   *   K 3.7      CO2 12*   BUN 30*   CREATININE 1.9*   CALCIUM 7.6*     CBC:   Recent Labs   Lab 10/18/20  0522 10/19/20  0316   WBC 1.43* 5.46   HGB 9.5* 10.3*   HCT 29.0* 30.4*   PLT 50* 43*       Significant Imaging: I have reviewed all pertinent imaging results/findings within the past 24 hours.

## 2020-10-20 PROBLEM — E44.0 MODERATE MALNUTRITION: Status: ACTIVE | Noted: 2020-01-01

## 2020-10-20 NOTE — CHAPLAIN
Pt was soundly sleeping, so I did not go into room; I said a silent, short blessing over his room. Lord, in your mercy.

## 2020-10-20 NOTE — PLAN OF CARE
"Pt has been hallucinating all day. This am pt stated " I see chemical formulas on the wall to my side. I know there is a board with witting on it." reoriented pt. Asked where he was, the date , and his birthday, all of which he answered correctly. Pt refused breakfast and lunch. He ate lunch mid afternoon but only picked at tray. He did drink his lemon aide. Pt asked for pain medication of Morphine for abdominal pain. Pt had already had his scheduled doses of Bentyl. Pt refused sublingual cramping medication. Brought 4 mg Morphine in but pt stated he needed the 8 mg. Pt's hallucinations got somewhat worse. As the day wore on he was in and out of reality. When wife came in around supper time, he thought there was a hallway behind his  bed. He thought the curtain rods were statues waving at him. Before the wife came in the charge RN came to see  how he was doing. He remembered someone was in but thought it was his wife. When asked if he new where he was he is able to state the correct name of facility. Pt admits " I know I'm  confused" . Wife to talk to Dr Summers.  Pt tells staff when he needs to be changed of diarrhea. Pt uses urinal with wipes to clean his hands. Pt able to pull self up in bed and move freely around.  "

## 2020-10-20 NOTE — PLAN OF CARE
Pt had a small loose mucous stool this am. This evening pt had a large green/yellow mucous stool. Pt able to roll self in bed and pull himself up. Wife came by at lunch time to check up on pt. He has been alert and lucid all day. No hallucinations. Uses urinal as needed. Sanitizing wipes to clean hands. Lungs with scattered rales today. Left elbow is holding more fluid than yesterday. Abdomen was very distended this am but has gone down. Pt states it is always distended but that  it is  less now. Pt still not eating much. Did not eat any breakfast. RT in to see  pt for preferences. Lunch pt ate all of broth. He took one bite of egg salad sandwich. Pt is eating supper at present.

## 2020-10-20 NOTE — CONSULTS
"  [facility]  Adult Nutrition  Consult Note    SUMMARY   Intervention: general healthful diet, nutrition education, and prescription medication- appetite stimulant     Recommendation:   1) Change diet to low fiber- add banana/applesauce or mashed potato at meals   2) Add boost pudding with meals   3) weigh pt weekly   4) Continue appetite stimulant, replete lytes    Goals: 1) PO intakes 50% of meals and supplements at f/u  Nutrition Goal Status: new  Communication of RD Recs: reviewed with physician(POC, sticky note, second sign)    Reason for Assessment    Reason For Assessment: consult  Diagnosis: (septic shock)  Relevant Medical History: PTLD s/p chemo, CKD 3, renal transplant 4 years ago  Interdisciplinary Rounds: attended    General Information Comments: 71 y/o male admitted with sepsis, neutropenia as well as diarrhea/ cramps x 2 weeks as well as fever, poor appetite, and malaise x 1 week. Last chemo, [facility], end of last week. NFPE done 10/20/20 mild-severe wasting seen. Pt within UBW range. Tells me he has had a, "poor appetite for years," worse this past week. Only ate 2 bites of his oatmeal.    Nutrition Discharge Planning: to be determined- Regular diet + boost plus as needed    Nutrition Risk Screen    Nutrition Risk Screen: no indicators present    Nutrition/Diet History    Spiritual, Cultural Beliefs, Baptist Practices, Values that Affect Care: no  Food Allergies: NKFA  Factors Affecting Nutritional Intake: decreased appetite, abdominal pain, diarrhea    Anthropometrics    Temp: 97.3 °F (36.3 °C)  Height Method: Measured(10/2/20)  Height: 5' 11"  Height (inches): 71 in  Weight Method: Bed Scale  Weight: 68.1 kg (150 lb 2.1 oz)  Weight (lb): 150.13 lb  Ideal Body Weight (IBW), Male: 172 lb  % Ideal Body Weight, Male (lb): 87.28 %  BMI (Calculated): 20.9  BMI Grade: 18.5-24.9 - normal  Usual Body Weight (UBW), k kg(20)  Weight Change Amount: (65.8 kg 10/2/20 and 10/30/19)  % " Usual Body Weight: 97.49  % Weight Change From Usual Weight: -2.72 %       Lab/Procedures/Meds    Pertinent Labs Reviewed: reviewed  BMP  Lab Results   Component Value Date     (L) 10/20/2020    K 3.6 10/20/2020     (H) 10/20/2020    CO2 9 (LL) 10/20/2020    BUN 30 (H) 10/20/2020    CREATININE 2.0 (H) 10/20/2020    CALCIUM 6.9 (LL) 10/20/2020    ANIONGAP 8 10/20/2020    ESTGFRAFRICA 38 (A) 10/20/2020    EGFRNONAA 33 (A) 10/20/2020     Lab Results   Component Value Date    CALCIUM 6.9 (LL) 10/20/2020    PHOS 2.0 (L) 10/15/2020     Lab Results   Component Value Date    IRON <10 (L) 10/17/2020    TIBC 136 (L) 10/17/2020    FERRITIN 4,173 (H) 10/17/2020     Lab Results   Component Value Date    ALBUMIN 2.4 (L) 10/15/2020       Pertinent Medications Reviewed: reviewed  Pertinent Medications Comments: norepinephrine 0.02 mcg/kg/min, calcitriol, iron, dronabinol, NS @ 150 ml/hr, Mg sulfate, zofran, KCl, promethazine    Estimated/Assessed Needs    Weight Used For Calorie Calculations: 68.1 kg (150 lb 2.1 oz)  Energy Calorie Requirements (kcal): MSJ ( x 1.3-1.4 critical care wt maintenance) = 5790-9813 kcal  Energy Need Method: [place]-St Jeor, Kcal/kg  Protein Requirements: 1.2 g protein/kg ( CA, wasting, critical care vs. CKD) = 81 g protein  Weight Used For Protein Calculations: 68.1 kg (150 lb 2.1 oz)  Fluid Requirements (mL): 2000 ml or per MD  Estimated Fluid Requirement Method: RDA Method  CHO Requirement: N/A      Nutrition Prescription Ordered    Current Diet Order: Regular    Evaluation of Received Nutrient/Fluid Intake    Energy Calories Required: meeting needs  Protein Required: meeting needs  Fluid Required: exceeds needs  Total Fluid Intake (mL/kg): IVF @ 150 ml/hr  Tolerance: tolerating  % Intake of Estimated Energy Needs: 25%  % Meal Intake: 25%    Nutrition Risk    Level of Risk/Frequency of Follow-up: moderate 2 x weekly     Assessment and Plan    Moderate malnutrition  Contributing Nutrition  Diagnosis  Moderate chronic illness related malnutrition    Related to (etiology):   Increased needs d/t CA and decreased appetite    Signs and Symptoms (as evidenced by):   1) PO intakes < 75% x > 1 month  2) mild-moderate muscle/ fat wasting as charted below    Interventions:  Above    Nutrition Diagnosis Status:   new           Monitor and Evaluation    Food and Nutrient Intake: energy intake, food and beverage intake  Food and Nutrient Adminstration: diet order  Anthropometric Measurements: weight  Biochemical Data, Medical Tests and Procedures: electrolyte and renal panel, gastrointestinal profile  Nutrition-Focused Physical Findings: overall appearance     Malnutrition Assessment  Malnutrition Type: chronic illness  Skin (Micronutrient): (Brandon = 17)  Teeth (Micronutrient): (missing some)   Micronutrient Evaluation: suspected deficiency  Micronutrient Evaluation Comments: Na, check iron   Energy Intake (Malnutrition): less than 75% for greater than or equal to 1 month   Orbital Region (Subcutaneous Fat Loss): moderate depletion  Upper Arm Region (Subcutaneous Fat Loss): moderate depletion  Thoracic and Lumbar Region: mild depletion   Mormonism Region (Muscle Loss): moderate depletion(severe buccal)  Clavicle Bone Region (Muscle Loss): moderate depletion  Clavicle and Acromion Bone Region (Muscle Loss): moderate depletion  Scapular Bone Region (Muscle Loss): moderate depletion  Dorsal Hand (Muscle Loss): mild depletion  Patellar Region (Muscle Loss): well nourished  Anterior Thigh Region (Muscle Loss): well nourished  Posterior Calf Region (Muscle Loss): well nourished       Subcutaneous Fat Loss (Final Summary): moderate protein-calorie malnutrition  Muscle Loss Evaluation (Final Summary): moderate protein-calorie malnutrition         Nutrition Follow-Up      yes

## 2020-10-20 NOTE — PROGRESS NOTES
"[facility] Medicine  Progress Note    Patient Name: Alin Burektt  MRN: [id number]  Patient Class: IP- Inpatient   Admission Date: 10/15/2020  Length of Stay: 4 days  Attending Physician: Julia Summers MD  Primary Care Provider: Carmen Krueger MD        Subjective:     Principal Problem:Septic shock        HPI:  Patient has been having diarrhea with "jelly-like" consistency as well as crampiness in the abdomen.  Symptoms began roughly one week ago.  Also has had fever and malaise.  Appetite poor.  Fatigue as well.  He has been receiving chemo for PTLD; most recent cycle of CHOP was end of last week.  He has history of renal transplant four years ago and is currently on twice-a-day  Prograf.  He's had no cough, no unusual headache, no sinus pain, and no burning on urination.  He feels that he's probably dehydrated.    Overview/Hospital Course:  No notes on file    Interval History:  Diarrhea improved.  White count improved H&H stable.  Continues to have persistent thrombocytopenia. Still lethargic, was at baseline mental status during my eval during the day but became more confused ans started hallucinating later towards end of the day    Review of Systems   Constitutional: Positive for chills, fatigue and fever.   Respiratory: Negative for cough and shortness of breath.    Cardiovascular: Negative for chest pain.   Gastrointestinal: Positive for abdominal pain and diarrhea.     Objective:     Vital Signs (Most Recent):  Temp: 98.4 °F (36.9 °C) (10/19/20 0715)  Pulse: 98 (10/19/20 0730)  Resp: (!) 25 (10/19/20 0730)  BP: 119/68 (10/19/20 0730)  SpO2: 99 % (10/19/20 0730) Vital Signs (24h Range):  Temp:  [97.7 °F (36.5 °C)-99 °F (37.2 °C)] 98.4 °F (36.9 °C)  Pulse:  [] 98  Resp:  [13-28] 25  SpO2:  [94 %-100 %] 99 %  BP: ()/(64-95) 119/68     Weight: 68.1 kg (150 lb 2.1 oz)  Body mass index is 20.94 kg/m².    Intake/Output Summary (Last 24 hours) at 10/19/2020 0827  Last " data filed at 10/19/2020 0606  Gross per 24 hour   Intake 2628.16 ml   Output 1075 ml   Net 1553.16 ml      Physical Exam  Vitals signs reviewed.   Constitutional:       General: He is not in acute distress.     Appearance: He is ill-appearing. He is not diaphoretic.   HENT:      Mouth/Throat:      Mouth: Mucous membranes are dry.   Eyes:      General: No scleral icterus.        Right eye: No discharge.         Left eye: No discharge.   Neck:      Vascular: No JVD.   Cardiovascular:      Rate and Rhythm: Normal rate and regular rhythm.   Pulmonary:      Effort: Pulmonary effort is normal.      Breath sounds: Normal breath sounds.   Abdominal:      General: Bowel sounds are normal. There is no distension.      Palpations: Abdomen is soft.      Tenderness: There is abdominal tenderness in the left upper quadrant and left lower quadrant. There is no rebound.   Skin:     General: Skin is warm.      Findings: No rash.   Neurological:      Mental Status: He is alert.         Significant Labs:   BMP:   Recent Labs   Lab 10/19/20  0316   GLU 87   *   K 3.7      CO2 12*   BUN 30*   CREATININE 1.9*   CALCIUM 7.6*     CBC:   Recent Labs   Lab 10/18/20  0522 10/19/20  0316   WBC 1.43* 5.46   HGB 9.5* 10.3*   HCT 29.0* 30.4*   PLT 50* 43*       Significant Imaging: I have reviewed all pertinent imaging results/findings within the past 24 hours.      Assessment/Plan:      * Septic shock  Source is likely colitis from C.difficile.  UA does not appear infected.  Empiric antibiotcs: vancomycin and cefepime.  IV and oral vancomycin.  Continue norepinephrine drip.  Lactate level is ok.      Anemia due to chemotherapy  Patient's anemia is currently controlled. Has not received any PRBCs to date.. Etiology likely d/t chemo  Current CBC reviewed-   Lab Results   Component Value Date    HGB 10.3 (L) 10/19/2020    HCT 30.4 (L) 10/19/2020     Monitor serial CBC and transfuse if patient becomes hemodynamically unstable,  symptomatic or H/H drops below 7/21.         Hypokalemia due to excessive gastrointestinal loss of potassium  Give supplement and monitor level.    Potassium   Date Value Ref Range Status   10/19/2020 3.7 3.5 - 5.1 mmol/L Final   10/18/2020 4.0 3.5 - 5.1 mmol/L Final         Febrile neutropenia  Will treat empirically with cefepime and vancomycin.  Monitor leukocyte count.  Consulting with hematologist for help.  Granix given today.;    WBC   Date Value Ref Range Status   10/19/2020 5.46 3.90 - 12.70 K/uL Final       Post-transplant lymphoproliferative disorder (PTLD)  Has been receiving chemo for this.      Stage 3 chronic kidney disease  Creatine stable for now. BMP reviewed- noted Estimated Creatinine Clearance: 35.3 mL/min (A) (based on SCr of 1.9 mg/dL (H)). according to latest data. Monitor UOP and serial BMP and adjust therapy as needed. Renally dose meds.      -donor kidney transplant  Continue Prograf at usual dose.      Acquired hypothyroidism  Continue levothyroxine.      VTE Risk Mitigation (From admission, onward)         Ordered     IP VTE LOW RISK PATIENT  Once      10/15/20 2220                Discharge Planning   TRINH:      Code Status: Full Code   Is the patient medically ready for discharge?:     Reason for patient still in hospital (select all that apply): Patient trending condition and Treatment  Discharge Plan A: Home with family            Critical care time spent on the evaluation and treatment of severe organ dysfunction, review of pertinent labs and imaging studies, discussions with consulting providers and discussions with patient/family: 60 minutes.      Julia Summers MD  Department of Hospital Medicine   [facility]

## 2020-10-20 NOTE — PROGRESS NOTES
[facility]  Patient Name: Alin Burkett  : 1950  Age: 70 y.o.  Sex: male  MRN: [id number]  Admission Date: 10/15/2020  Hospital Length of Stay: 5 days  Code Status: Full Code   Admitting Provider: Brennan Decker MD  Attending Provider: Julia Summers MD  Primary Care Physician: Carmen Krueger MD  Full Code  Subjective:     Date of Visit: 10/20/2020             Principal Problem: Septic shock    Patient ID: Alin Burkett is a 70 y.o. 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 ED workup showed pt was neutropenic with WBC 0.2 with fever. Pt was cultured and given fluid resuscitation and vancomycin and cefepime. Stool cultures positive for C. Difficile toxin. CT abd/pelvis without contrast revealed severe proctocolitis without megacolon. Pt remains pancytopenic with most recent WBC 0.11 with ANC 0.0 and H/H 9.6/29.7 and platelets 91. Pt seen at bedside with his wife and states he has chronic throbbing abdominal pain made worse by eating and has had chronic diarrhea. He states he is fatigued and has decreased appetite along with fever/chills and night sweats.    10/19/2020: Pt seen at bedside and appears cachectic. He endorses that he is still having diarrhea, but that it has improved and decreased in frequency and consistency has thickened. Pt states abdominal pain has improved, but it still present. He endorses fatigue. Pt denies hemoptysis, hematochezia, melena, hematuria.    10/20/2020: Pt seen at bedside in ICU. He states that he is feeling less lethargic. He states his diarrhea is improving and that he only has abdominal pain to palpation.    Review of Systems   Constitutional: Positive for activity change, appetite change and diaphoresis. Negative for chills and  fever.   HENT: Negative for mouth sores and trouble swallowing.    Eyes: Negative for photophobia and visual disturbance.   Respiratory: Negative for cough, chest tightness, shortness of breath, wheezing and stridor.    Cardiovascular: Negative for chest pain and leg swelling.   Gastrointestinal: Positive for abdominal distention, abdominal pain and diarrhea. Negative for constipation, nausea and vomiting.   Musculoskeletal: Negative for arthralgias, back pain and myalgias.   Skin: Negative for color change, pallor, rash and wound.   Neurological: Negative for syncope, speech difficulty and weakness.   Hematological: Negative for adenopathy. Does not bruise/bleed easily.   Psychiatric/Behavioral: Negative for agitation, behavioral problems, confusion, decreased concentration and dysphoric mood.        ONCOLOGY HISTORY:     1. Monomorphic post-transplant lymphoproliferative disorder              A. 2/2020: Noticed left inguinal lymphadenopathy after a dog bite (failed to resolve with antibiotics)              B. 6/2/2020: Saw Dr. Collins in infectious diseases for inguinal lymphadenopathy - referred for biopsy              C. 6/30/2020: Core biopsy of L inguinal lymph node shows B-cell lymphoma of germinal center origin; EBV-positive by LONNIE; morphology nondiagnostic of PTLD vs follicular lymphoma              D. 7/8/2020: PET/CT shows left internal iliac chain node measuring 3.3 x 3 cm with SUV max 37; L inguinal node measures 3.2 x 2.4 cm with SUV max 36              E. 7/13/2020: Excisional biopsy of left inguinal node shows monomorphic post-transplant lymphoproliferative disorder (DLBCL, GCB 60%, follicular lymphoma, grade 3B 40%); FISH for MYC rearrangement is negative              F. 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  Renal allograft biopsy 17 (DIVINE): 21 glomeruli, none globally sclerosed, <5% interstitial fibrosis, no ACR, c4d negative, AVR CCT Type 2 (V1 lesion); plan THYMO     Dissecting aortic aneurysm (any part), abdominal     Diverticulosis     Encounter for blood transfusion     ESRD (end stage renal disease) 2010    H/O urethral stricture 2018    H/O: urethral stricture     History of AAA (abdominal aortic aneurysm) repair     History of urethral stricture 2018    Hypertension     Hypokalemia     Hypothyroidism 1/10/2014    Inguinal hernia bilateral, non-recurrent     Kidney stones     Organ transplant candidate 2013    Plantar warts 1/10/2014    Recurrent UTI 2017    S/P kidney transplant     Secondary hyperparathyroidism, renal     Thyroid disease        Family History   Problem Relation Age of Onset    Diabetes Mother     Alzheimer's disease Mother     Alcohol abuse Father     HIV Brother     Stroke Maternal Aunt     Kidney disease Paternal Uncle     Kidney disease Cousin     No Known Problems Sister     No Known Problems Daughter     No Known Problems Sister     No Known Problems Brother     No Known Problems Brother     Cancer Brother         thyroid cancer    Colon cancer Brother     Melanoma Neg Hx     Psoriasis Neg Hx     Lupus Neg Hx     Eczema Neg Hx     Colon polyps Neg Hx     Crohn's disease Neg Hx     Ulcerative colitis Neg Hx     Celiac disease Neg Hx        Past Surgical History:   Procedure  Laterality Date    ABDOMINAL SURGERY      exploratory lapatomy x 2    ABLATION N/A 8/22/2019    Procedure: ABLATION, SVT;  Surgeon: Emerson Mcmanus MD;  Location: [facility] EP LAB;  Service: Cardiology;  Laterality: N/A;  SVT, RFA, CARTO, anes, GP, 323    AORTA - SUPERIOR MESENTERIC ARTERY BYPASS GRAFT      BLADDER NECK RECONSTRUCTION      BLADDER SURGERY      COLONOSCOPY  10/10/2013    Dr. Gutierrez, repeat in 5 years    CYSTOSCOPY      CYSTOSCOPY N/A 12/19/2018    Procedure: CYSTOSCOPY;  Surgeon: Dewey Mann MD;  [facility];  Service: Urology;  Laterality: N/A;  45 min    CYSTOURETHROSCOPY WITH DIRECT VISION INTERNAL URETHROTOMY N/A 12/19/2018    Procedure: CYSTOSCOPY, WITH DIRECT VISION INTERNAL URETHROTOMY;  Surgeon: Dewey Mann MD;  [address]  Service: Urology;  Laterality: N/A;    DILATION OF URETHRA N/A 12/19/2018    Procedure: DILATION, URETHRA;  Surgeon: Dewey Mann MD;  [address];  Service: Urology;  Laterality: N/A;    EXCISIONAL BIOPSY N/A 7/13/2020    Procedure: EXCISIONAL BIOPSY- LEFT INGUINAL NODE;  Surgeon: Vlad Epstein MD;  Location: [address];  Service: General;  Laterality: N/A;    FLEXIBLE CYSTOSCOPY N/A 11/6/2019    Procedure: CYSTOSCOPY, FLEXIBLE;  Surgeon: Dewey Mann MD;  [facility];  Service: Urology;  Laterality: N/A;    GASTROJEJUNOSTOMY      HEMORRHOID SURGERY      HERNIA REPAIR      INSERTION OF VENOUS ACCESS PORT Left 7/27/2020    Procedure: INSERTION, VENOUS ACCESS PORT;  Surgeon: Vlad Epstein MD;  Location: [facility] OR [address] [place] [address];  Service: General;  Laterality: Left;    KIDNEY TRANSPLANT      LEFT HEART CATHETERIZATION Left 8/20/2019    Procedure: Left heart cath;  Surgeon: Javan Oscar MD;  Location: [facility] CATH/EP LAB;  Service: Cardiology;  Laterality: Left;    LITHOTRIPSY      LYMPH NODE BIOPSY N/A 6/30/2020    Procedure: BIOPSY, LYMPH NODE;  Surgeon: [facility] Diagnostic  Provider;  [facility];  Service: General;  Laterality: N/A;  189 lymph node biopsy /ULTRASOUND    PERCUTANEOUS NEPHROLITHOTRIPSY      right  ESWL  10/31/12    right ESWL  6/26/12    URETHROPLASTY USING PATCH GRAFT N/A 11/6/2019    Procedure: URETHROPLASTY, USING PATCH GRAFT BUCCAL MUCOSA GRAFT;  Surgeon: Dewey Mann MD;  Location: [facility] OR [address] [place] [address];  Service: Urology;  Laterality: N/A;  3 HOURS       Social History     Socioeconomic History    Marital status: Single     Spouse name: Not on file    Number of children: Not on file    Years of education: Not on file    Highest education level: Not on file   Occupational History     Employer: Disabled   Social Needs    Financial resource strain: Not on file    Food insecurity     Worry: Not on file     Inability: Not on file    Transportation needs     Medical: Not on file     Non-medical: Not on file   Tobacco Use    Smoking status: Former Smoker     Packs/day: 0.50     Years: 40.00     Pack years: 20.00     Types: Cigarettes     Quit date: 6/16/2010     Years since quitting: 10.3    Smokeless tobacco: Never Used   Substance and Sexual Activity    Alcohol use: Yes     Alcohol/week: 3.0 standard drinks     Types: 3 Cans of beer per week     Comment: occasional/social    Drug use: No     Comment: THC in youth    Sexual activity: Yes     Partners: Female     Birth control/protection: None   Lifestyle    Physical activity     Days per week: Not on file     Minutes per session: Not on file    Stress: Not on file   Relationships    Social connections     Talks on phone: Not on file     Gets together: Not on file     Attends [employer] service: Not on file     Active member of club or organization: Not on file     Attends meetings of clubs or organizations: Not on file     Relationship status: Not on file   Other Topics Concern    Not on file   Social History Narrative    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       Current  Facility-Administered Medications   Medication Dose Route Frequency Provider Last Rate Last Dose    acetaminophen tablet 650 mg  650 mg Oral Q4H PRN Brennan Decker MD   650 mg at 10/16/20 1259    allopurinoL tablet 300 mg  300 mg Oral Daily Brennan Decker MD   300 mg at 10/20/20 0944    aspirin EC tablet 81 mg  81 mg Oral Daily Brennan Decker MD   81 mg at 10/20/20 0944    brimonidine-timoloL 0.2-0.5 % ophthalmic solution 1 drop  1 drop Both Eyes Q12H Brennan Decker MD   1 drop at 10/20/20 0951    calcitRIOL capsule 0.5 mcg  0.5 mcg Oral Daily Brennan Decker MD   0.5 mcg at 10/20/20 0944    dicyclomine capsule 10 mg  10 mg Oral QID Lola Tolbert MD   10 mg at 10/20/20 1212    dronabinoL capsule 2.5 mg  2.5 mg Oral BID Julia Summers MD   2.5 mg at 10/20/20 0944    famotidine tablet 20 mg  20 mg Oral QHS Brennan Decker MD   20 mg at 10/19/20 2058    ferrous sulfate EC tablet 325 mg  325 mg Oral BID Alexi Camp PA-C   325 mg at 10/20/20 0944    hyoscyamine ODT 0.125 mg  0.125 mg Sublingual Q4H PRN Lola Tolbert MD   0.125 mg at 10/17/20 1604    levothyroxine tablet 100 mcg  100 mcg Oral Before breakfast Brennan Decker MD   100 mcg at 10/20/20 0641    magnesium sulfate 2g in water 50mL IVPB (premix)  2 g Intravenous PRN Brennan Decker MD        magnesium sulfate 2g in water 50mL IVPB (premix)  4 g Intravenous PRN Brennan Decker MD        metoclopramide HCl injection 10 mg  10 mg Intravenous Q6H PRN Brennan Decker MD        metronidazole IVPB 500 mg  500 mg Intravenous Q8H Lola Tolbert  mL/hr at 10/20/20 1152 500 mg at 10/20/20 1152    morphine injection 2 mg  2 mg Intravenous Q4H PRN Julia Summers MD        NORepinephrine bitartrate 8 mg in dextrose 5% 250 mL infusion  0.02 mcg/kg/min Intravenous Continuous Brennan Decker MD 2.6 mL/hr at 10/19/20 2357 0.02 mcg/kg/min at 10/19/20 1249    ondansetron disintegrating tablet 8  mg  8 mg Oral Q6H PRN Brennan Decker MD        potassium chloride 10 mEq in 100 mL IVPB  40 mEq Intravenous PRN Brennan Decker  mL/hr at 10/20/20 0729 40 mEq at 10/20/20 0729    And    potassium chloride 10 mEq in 100 mL IVPB  60 mEq Intravenous PRN Brennan Decker MD        And    potassium chloride 10 mEq in 100 mL IVPB  80 mEq Intravenous PRN Brennan Decker MD        promethazine tablet 25 mg  25 mg Oral Q6H PRN Brennan Decker MD        sodium chloride 0.45% 1,000 mL with sodium bicarbonate 100 mEq infusion   Intravenous Continuous Tc Malave MD        sodium chloride 0.9% flush 10 mL  10 mL Intravenous PRN Brennan Decker MD        tacrolimus capsule 2 mg  2 mg Oral Daily PM Brennan Decker MD   2 mg at 10/19/20 1706    tacrolimus capsule 3 mg  3 mg Oral Daily AM Brennan Decker MD   3 mg at 10/20/20 0729    travoprost 0.004 % ophthalmic solution 1 drop  1 drop Both Eyes QHS Brennan Decker MD   1 drop at 10/19/20 2101    vancomycin 25 mg/mL oral solution 500 mg  500 mg Oral Q6H Lola Tolbert MD   500 mg at 10/20/20 1141        allopurinoL  300 mg Oral Daily    aspirin  81 mg Oral Daily    brimonidine-timoloL  1 drop Both Eyes Q12H    calcitRIOL  0.5 mcg Oral Daily    dicyclomine  10 mg Oral QID    dronabinoL  2.5 mg Oral BID    famotidine  20 mg Oral QHS    ferrous sulfate  325 mg Oral BID    levothyroxine  100 mcg Oral Before breakfast    metronidazole  500 mg Intravenous Q8H    tacrolimus  2 mg Oral Daily PM    tacrolimus  3 mg Oral Daily AM    travoprost  1 drop Both Eyes QHS    vancomycin  500 mg Oral Q6H        norepinephrine bitartrate-D5W 0.02 mcg/kg/min (10/19/20 9592)    custom IV infusion builder         acetaminophen, hyoscyamine, magnesium sulfate IVPB, magnesium sulfate IVPB, metoclopramide HCl, morphine, ondansetron, potassium chloride in water **AND** potassium chloride in water **AND** potassium chloride in water, promethazine,  sodium chloride 0.9%    Antibiotics (From admission, onward)    Start     Stop Route Frequency Ordered    10/17/20 0000  vancomycin 25 mg/mL oral solution 500 mg  (C. difficile Infection (CDI) Treatment Order Panel)      10/26 0559 Oral Every 6 hours 10/16/20 1755    10/16/20 1900  metronidazole IVPB 500 mg      -- IV Every 8 hours (non-standard times) 10/16/20 1755          Review of patient's allergies indicates:  No Known Allergies  All medications and past history have been reviewed.    Objective:      Vitals:  Patient Vitals for the past 24 hrs:   BP Temp Temp src Pulse Resp SpO2   10/20/20 1230 (!) 100/58 -- -- 83 (!) 32 99 %   10/20/20 1215 96/61 -- -- 83 18 100 %   10/20/20 1200 100/67 -- -- 86 (!) 28 99 %   10/20/20 1145 105/63 97.3 °F (36.3 °C) Oral 83 (!) 21 100 %   10/20/20 1130 (!) 107/59 -- -- 85 16 100 %   10/20/20 1115 107/62 -- -- 86 13 99 %   10/20/20 1100 102/67 -- -- 85 13 98 %   10/20/20 1045 96/64 -- -- 87 15 98 %   10/20/20 1030 105/69 -- -- 88 16 99 %   10/20/20 1015 114/75 -- -- 99 (!) 21 100 %   10/20/20 1000 116/83 -- -- 97 (!) 22 100 %   10/20/20 0945 114/76 -- -- 96 13 99 %   10/20/20 0930 109/74 -- -- 96 15 97 %   10/20/20 0915 111/62 -- -- 101 15 99 %   10/20/20 0900 112/70 -- -- 95 16 99 %   10/20/20 0845 103/73 -- -- 96 13 98 %   10/20/20 0830 98/65 -- -- 98 15 99 %   10/20/20 0815 111/74 -- -- 94 19 96 %   10/20/20 0800 106/69 97.6 °F (36.4 °C) Oral 101 20 97 %   10/20/20 0745 103/62 -- -- 98 (!) 23 96 %   10/20/20 0730 117/75 -- -- 99 15 98 %   10/20/20 0715 112/62 -- -- 96 13 98 %   10/20/20 0700 109/69 -- -- 99 13 98 %   10/20/20 0645 96/72 -- -- 100 14 98 %   10/20/20 0630 -- -- -- 95 12 98 %   10/20/20 0615 (!) 80/58 -- -- 101 13 98 %   10/20/20 0600 94/64 -- -- 100 13 97 %   10/20/20 0545 97/61 -- -- 100 14 97 %   10/20/20 0530 102/68 -- -- 97 (!) 21 99 %   10/20/20 0515 103/68 -- -- 96 15 97 %   10/20/20 0500 102/71 -- -- 96 16 98 %   10/20/20 0445 100/68 -- -- 98 19 100 %    10/20/20 0430 108/77 -- -- 108 (!) 24 (!) 77 %   10/20/20 0415 120/78 -- -- 90 15 98 %   10/20/20 0400 121/67 98.3 °F (36.8 °C) Oral 90 18 98 %   10/20/20 0345 (!) 89/64 -- -- 97 14 98 %   10/20/20 0330 99/80 -- -- 94 16 98 %   10/20/20 0315 (!) 112/92 -- -- 99 16 98 %   10/20/20 0300 116/74 -- -- 95 18 98 %   10/20/20 0245 111/63 -- -- 94 16 98 %   10/20/20 0230 105/73 -- -- 94 17 97 %   10/20/20 0215 96/61 -- -- 97 (!) 21 98 %   10/20/20 0200 -- -- -- 96 17 98 %   10/20/20 0145 (!) 86/66 -- -- 103 (!) 21 (!) 92 %   10/20/20 0130 (!) 86/67 -- -- 96 14 98 %   10/20/20 0115 106/65 -- -- 97 12 98 %   10/20/20 0100 99/63 -- -- 100 17 99 %   10/20/20 0045 91/70 -- -- 100 17 99 %   10/20/20 0030 96/67 -- -- 95 17 96 %   10/20/20 0015 94/68 -- -- 96 17 99 %   10/20/20 0000 106/60 -- -- 96 13 98 %   10/19/20 2345 102/68 -- -- 94 15 98 %   10/19/20 2330 -- -- -- 98 16 98 %   10/19/20 2315 (!) 92/59 -- -- 97 15 98 %   10/19/20 2300 (!) 127/59 -- -- 95 12 99 %   10/19/20 2245 106/63 -- -- 93 12 99 %   10/19/20 2230 104/67 -- -- 95 19 99 %   10/19/20 2215 91/66 -- -- 95 13 98 %   10/19/20 2200 100/71 -- -- 94 12 99 %   10/19/20 2145 102/69 -- -- 95 13 99 %   10/19/20 2130 103/67 -- -- 93 15 98 %   10/19/20 2115 112/64 -- -- 95 15 98 %   10/19/20 2100 -- -- -- 98 (!) 22 (!) 90 %   10/19/20 2045 100/69 -- -- 88 13 98 %   10/19/20 2030 109/66 -- -- 97 (!) 23 98 %   10/19/20 2015 112/64 -- -- 95 11 98 %   10/19/20 2000 105/63 98.5 °F (36.9 °C) Oral 95 10 98 %   10/19/20 1945 96/60 -- -- 97 12 98 %   10/19/20 1930 103/61 -- -- 96 11 98 %   10/19/20 1915 106/62 -- -- 97 12 98 %   10/19/20 1900 111/61 -- -- 87 14 98 %   10/19/20 1845 121/73 -- -- 96 (!) 23 (!) 94 %   10/19/20 1830 98/64 -- -- 98 20 98 %   10/19/20 1815 114/67 -- -- 97 18 99 %   10/19/20 1800 (!) 103/59 -- -- 90 17 99 %   10/19/20 1745 93/68 -- -- 94 20 98 %   10/19/20 1730 (!) 108/57 -- -- 93 14 97 %   10/19/20 1715 (!) 161/76 -- -- 93 (!) 22 98 %   10/19/20 1700  120/68 -- -- 91 19 98 %   10/19/20 1645 116/72 -- -- 94 18 98 %   10/19/20 1630 108/84 -- -- 97 (!) 22 (!) 76 %   10/19/20 1615 112/81 -- -- 96 19 97 %   10/19/20 1600 127/76 -- -- 93 18 97 %   10/19/20 1545 115/80 -- -- 98 (!) 21 98 %   10/19/20 1530 110/77 -- -- 95 (!) 21 95 %   10/19/20 1515 106/78 -- -- 94 19 97 %   10/19/20 1511 107/68 97.6 °F (36.4 °C) Oral 98 (!) 25 97 %   10/19/20 1500 (!) 95/53 -- -- 95 (!) 23 98 %   10/19/20 1445 -- -- -- 94 (!) 27 (!) 94 %   10/19/20 1430 99/68 -- -- 94 19 97 %   10/19/20 1415 93/62 -- -- 91 20 99 %   10/19/20 1400 (!) 109/56 -- -- 90 20 99 %   10/19/20 1345 100/63 -- -- 92 15 98 %   10/19/20 1330 97/63 -- -- 93 (!) 27 96 %   10/19/20 1315 103/80 -- -- 103 (!) 24 (!) 94 %   10/19/20 1300 -- -- -- 93 19 96 %   10/19/20 1245 101/75 -- -- 94 (!) 21 97 %   10/19/20 1240 -- -- -- -- 20 --      Body mass index is 20.94 kg/m².  Body surface area is 1.85 meters squared.    Last 24 Hours:    Intake/Output Summary (Last 24 hours) at 10/20/2020 1238  Last data filed at 10/20/2020 1022  Gross per 24 hour   Intake 2509.34 ml   Output 375 ml   Net 2134.34 ml     Weight Readings:  Wt Readings from Last 5 Encounters:   10/17/20 68.1 kg (150 lb 2.1 oz)   10/10/20 68.5 kg (151 lb)   10/09/20 66.4 kg (146 lb 6.2 oz)   10/02/20 65.8 kg (145 lb)   09/11/20 68.6 kg (151 lb 2 oz)      Blood Type:  B POS     Physical Exam  Constitutional:       Appearance: He is ill-appearing.   HENT:      Head: Normocephalic and atraumatic.      Right Ear: External ear normal.      Left Ear: External ear normal.      Nose: Nose normal. No congestion or rhinorrhea.   Eyes:      General:         Right eye: No discharge.         Left eye: No discharge.      Extraocular Movements: Extraocular movements intact.      Conjunctiva/sclera: Conjunctivae normal.      Pupils: Pupils are equal, round, and reactive to light.   Neck:      Musculoskeletal: Normal range of motion and neck supple. No neck rigidity.    Cardiovascular:      Rate and Rhythm: Normal rate and regular rhythm.      Heart sounds: Normal heart sounds. No murmur.   Pulmonary:      Effort: Pulmonary effort is normal. No respiratory distress.      Breath sounds: Normal breath sounds. No stridor. No wheezing, rhonchi or rales.   Abdominal:      General: Bowel sounds are normal. There is distension.      Palpations: Abdomen is soft.      Tenderness: There is abdominal tenderness (LLQ). There is no guarding.   Musculoskeletal: Normal range of motion.         General: No deformity.      Right lower leg: No edema.      Left lower leg: No edema.   Lymphadenopathy:      Cervical: No cervical adenopathy.   Skin:     General: Skin is warm and dry.      Coloration: Skin is not pale.      Findings: Bruising (bialteral UE and chest) present. No erythema or rash.   Neurological:      General: No focal deficit present.      Mental Status: He is alert and oriented to person, place, and time. Mental status is at baseline.      Cranial Nerves: No cranial nerve deficit.   Psychiatric:         Mood and Affect: Mood normal.         Behavior: Behavior normal.         Thought Content: Thought content normal.         Judgment: Judgment normal.         Labs:  Recent Labs   Lab 10/18/20  0522 10/19/20  0316 10/20/20  0521   WBC 1.43* 5.46 12.82*   RBC 3.03* 3.22* 2.92*   HGB 9.5* 10.3* 9.3*   HCT 29.0* 30.4* 28.1*   PLT 50* 43* 46*   MCV 96 94 96     Recent Labs   Lab 10/15/20  1520  10/18/20  0927 10/19/20  0316 10/19/20  1414 10/20/20  0337      < > 129* 130*  --  128*   K 2.6*   < > 4.0 3.7 4.1 3.6      < > 108 110  --  111*   CO2 17*   < > 12* 12*  --  9*   BUN 20   < > 25* 30*  --  30*   CREATININE 1.4   < > 1.7* 1.9*  --  2.0*      < > 148* 87  --  207*   CALCIUM 7.9*   < > 7.8* 7.6*  --  6.9*   MG 2.3  --   --   --   --   --    PHOS 2.0*  --   --   --   --   --    ALKPHOS 65  --   --   --   --   --    PROT 5.2*  --   --   --   --   --    ALBUMIN 2.4*  --    --   --   --   --    BILITOT 0.7  --   --   --   --   --    AST 12  --   --   --   --   --    ALT 14  --   --   --   --   --     < > = values in this interval not displayed.       Imaging:  Results for orders placed or performed during the hospital encounter of 10/15/20 (from the past 2160 hour(s))   CT Abdomen Pelvis  Without Contrast    Impression    Findings consistent with severe proctocolitis.    Transplanted kidney right side of the pelvis with mild pelvocaliectasis and perinephric stranding nonspecific.  No calcified stones seen    Additional findings as detailed above including cystic lesion with in the native right kidney versus dilatation of collecting structure of the native right kidney.  Stones in the native right kidney.  Cystic lesion within the pancreas.    Final read    Virtual Radiology concordant      Electronically signed by: Ada Castillo MD  Date:    10/16/2020  Time:    08:56   Results for orders placed or performed during the hospital encounter of 08/06/20 (from the past 2160 hour(s))   CT Chest Abdomen Pelvis Without Contrast (XPD)    Impression    No acute abdominal pathology identified.    Nonvisualization of the left kidney with malrotation of the right kidney and multiple nonobstructing renal stones.  No hydronephrosis.    Right pelvic renal allograft with no evidence for renal allograft focal lesion, nephrolithiasis, or hydronephrosis.    Stable mild ectasia of the infrarenal abdominal aorta measuring up to 2.6 cm without evidence for aortic aneurysm or other acute aortic pathology.  Atherosclerosis identified.    Multiple enlarged lymph nodes in the left inguinal region, with interval dissection of multiple left inguinal and pelvic lymph nodes when compared to nuclear medicine PET-CT 07/08/2020.  Correlate with biopsy results.    Stable cystic appearing lesion in the pancreatic head measuring 1.2 cm.    Other findings as above.    Electronically signed by resident: Mati  Melia  Date:    08/06/2020  Time:    09:23    Electronically signed by: Jayson Staples MD  Date:    08/06/2020  Time:    10:34     *Note: Due to a large number of results and/or encounters for the requested time period, some results have not been displayed. A complete set of results can be found in Results Review.     PET CT 07/08/2020  FINDINGS:  Quality of the study: Adequate.     In the neck, there is no abnormal hypermetabolic activity worrisome for malignancy.  Vascular stent identified in the left axillary region.  No significant lymphadenopathy.     In the chest, there is no abnormal hypermetabolic activity worsened malignancy.  Coronary atherosclerosis.  0.4 cm pulmonary nodule identified in the left upper lobe (axial series 3, image 67), lesion is too small to characterize with PET-CT. Recommend attenuation expected follow-up examinations. No significant lymphadenopathy.     In the abdomen and pelvis, there is hypermetabolic lymphadenopathy throughout the left internal/external iliac chain and left groin.  New left internal iliac chain node measures 3.3 x 3 0 cm with SUV max of 37 (axial fused image 193).  Left inguinal node measures approximately 3.2 x 2.4 cm with SUV max of 36 (axial fused image 218), previously measured up to 1.4 cm.  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  The right kidney appears atrophic with abnormal contour parenchymal calcifications, unchanged.  Right lower quadrant transplant kidney in place.  Stable small bladder diverticulum.  Stable 1.2 cm pancreatic cyst, better characterized on most recent CT with IV contrast.  Stable bilateral probable adrenal adenomas.  Colonic diverticulosis without evidence of acute diverticulitis.  Stable fusiform abdominal aortic ectasia.     Spleen appears upper limit of normal for size measuring 12.0 cm in craniocaudal dimension.  Normal heterogeneous uptake similar to liver.     In the bones, there is no abnormal activity worrisome for malignancy.   Additional focus of increased uptake identified within the myofascial structures of the thigh, just deep to the left femur with SUV max of 27 (axial fused image 269).     Impression:     Hypermetabolic lymphadenopathy throughout the left iliac chain and left groin with additional hypermetabolic focus in the left thigh.  No hypermetabolic juan david disease above the diaphragm.  The Deauville score is 5.    All lab results and imaging results have been reviewed.    Assessment and Plan:      Present on Admission:   Febrile neutropenia   Septic shock   Acquired hypothyroidism   Post-transplant lymphoproliferative disorder (PTLD)   Stage 3 chronic kidney disease   Hypokalemia due to excessive gastrointestinal loss of potassium   Anemia due to chemotherapy   Anemia in stage 3 chronic kidney disease    Post-transplant lymphoproliferative disorder (PTLD)  -Cycle 4 RCHOP completed on 10/09/2020. Pt reaching corina time-frame s/p RCHOP infusion. Please have patient follow up with Primary Oncologist Dr. Luis Fernando Lopez within 72 hours of discharge.     C-difficile colitis  -Follow ID recommendations.      Normocytic Anemia with Thrombocytopenia  -Secondary to chemotherapy and disease.  -Neutropenia resolved. Pt given Granix 300mcg daily x 3 doses and ANC WNL with last granix dose administered 10/18/2020.   -Most recent H/H 9.3/28.1. Pt serum iron <10 and ferritin elevated. Pt in septic shock with decreased renal function. Ferritin is an acute phase reactant that can be elevated due to inflammation. Continue ferous sulfate BID for iron supplementation. We will hold on IV iron for now to avoid overloading the patient.   -Platelets are 46. Continue to monitor. Transfuse platelets PRN if platelet count <20,000 or if pt actively bleeding.  -Continue abx regimen for C. Difficile.    Sincerely,  Alexi Camp PA-C    Note is available for collaborating MD; Dr. Cathy Stafford for review.    Electronically signed by: Alexi  VANITA Camp

## 2020-10-20 NOTE — ASSESSMENT & PLAN NOTE
Contributing Nutrition Diagnosis  Moderate chronic illness related malnutrition    Related to (etiology):   Increased needs d/t CA and decreased appetite    Signs and Symptoms (as evidenced by):   1) PO intakes < 75% x > 1 month  2) mild-moderate muscle/ fat wasting as charted below    Interventions:  Above    Nutrition Diagnosis Status:   new

## 2020-10-20 NOTE — PLAN OF CARE
Pt remains in ICU. VSS. Continues on Levo 0.2 mcg/hour. Pt having moments of confusion throughout the night. No hallucinations. Pt reoriented when needed. Pt calls when needing to be changed for incontinence of stool. Nephrology consult called in at beginning of shift. Call light within reach. Safety maintained.

## 2020-10-20 NOTE — PLAN OF CARE
Intervention: general healthful diet, nutrition education, and prescription medication- appetite stimulant     Recommendation:   1) Change diet to low fiber- add banana/applesauce or mashed potato at meals   2) Add boost pudding with meals   3) weigh pt weekly   4) Continue appetite stimulant, replete lytes    Goals: 1) PO intakes 50% of meals and supplements at f/u  Nutrition Goal Status: new  Communication of RD Recs: reviewed with physician(POC, sticky note, second sign)

## 2020-10-20 NOTE — PROGRESS NOTES
"Progress Note  Infectious Disease    Reason for Consult:  C difficile colitis, neutropenia, sepsis    HPI: Alin Burkett is a   70 y.o. male who is status post renal transplant and is receiving chemotherapy for diffuse large B-cell lymphoma, presented to the emergency room on 10/15 with worsening of chronic diarrhea, abdominal cramps of 1 weeks duration.  He was found to be neutropenic with a white blood cell count of 0.2, volume depleted, hypokalemic, was cultured and given fluid resuscitation and vancomycin and cefepime.  He was admitted to the intensive care unit. He was recently given cefpodoxime to take prophylactically associated with his chemotherapy for recurring urinary tract infections.  Most recent positive urine culture was September 18th with E coli sensitive to 3rd generation cephalosporins carbapenems  He has had a hectic fever, stool for C difficile toxin was obtained and was resulted as positive today.  he has had a positive C difficile test before, August 2019.  White blood cells remain depressed at 0.1, platelets are depressed at 91,000, creatinine 1.6.  CT scan of the abdomen obtained last night shows severe proctocolitis without megacolon. Neupogen has been ordered. He is discouraged from eating by severe cramps.      10/17/2020 tmax is improved. Continues to be neutropenic Continues to have cramping, intermittent, abdominal pain. + diarrhea "lost count how many" He had refused vancomycin in am but decided to take it now  10/18/2020 afebrile,(103 on 16th)  ANC improved on granix 0---1029) he is feeling much better today.  Less abdominal pain.  Less loose stools.  10/19/2020.  Afebrile.  T-max 99°.  Less need for pressors.  WBC 5.  Creatinine slightly worse at 1.9.  Discussed with nurse.  Patient may have had some hallucinations earlier  10/20/2020.  Afebrile. Less abdominal pain. Less diarrhea. He feels his abdomen is still distended and does not allow him to take a deep breath. KUB  Is read " as below, I feel that transvere colon is a little bigger today. Creatinine has worsened. Bicarb is 9. Nephrologist is giving bicarb drip.  Las Vancomycin iv and cefepime were  given on 18   Tolerating vancomycin by mouth and metronidazole    Antibiotics (From admission, onward)    Start     Stop Route Frequency Ordered    10/17/20 0000  vancomycin 25 mg/mL oral solution 500 mg  (C. difficile Infection (CDI) Treatment Order Panel)      10/26 0559 Oral Every 6 hours 10/16/20 1755    10/16/20 1900  metronidazole IVPB 500 mg      -- IV Every 8 hours (non-standard times) 10/16/20 1755        Antifungals (From admission, onward)    None        Antivirals (From admission, onward)    None          EXAM & DIAGNOSTICS REVIEWED:   Vitals:     Temp:  [97.3 °F (36.3 °C)-98.5 °F (36.9 °C)]   Temp: 97.3 °F (36.3 °C) (10/20/20 1145)  Pulse: 83 (10/20/20 1230)  Resp: (!) 32 (10/20/20 1230)  BP: (!) 100/58 (10/20/20 1230)  SpO2: 99 % (10/20/20 1230)    Intake/Output Summary (Last 24 hours) at 10/20/2020 1441  Last data filed at 10/20/2020 1022  Gross per 24 hour   Intake 2509.34 ml   Output 375 ml   Net 2134.34 ml       General:  In NAD. Alert and attentive, cooperative, chronically ill appearing.    Eyes:  Anicteric, PERRL, EOMI  ENT:  No ulcers, exudates, thrush, nares patent, dentition  good  Neck:  supple, no masses or adenopathy appreciated  Lungs: Clear, no consolidation, rales, wheezes, rub  Heart:  RRR, no gallop/murmur/rub noted  Abd:  Mild distention, hypoactive BS, no masses or organomegaly appreciated. Less tender throughout. Multiple scars from prior surgeries  :  Voids/  urine clear, no flank tenderness  Musc:  Joints without effusion, swelling, erythema, synovitis,    Skin:  No rashes. No palmar or plantar lesions. No subungual petechiae  Wound:   Neuro: Alert, attentive, speech fluent, face symmetric, moves all extremities, no focal weakness. Ambulatory  Psych:   cooperative, pleasant      Extrem: No edema,  erythema, phlebitis, cellulitis, warm and well perfused  VAD:  portacath     Isolation:  Special contact    Lines/Tubes/Drains:    General Labs reviewed:  Recent Labs   Lab 10/18/20  0522 10/19/20  0316 10/20/20  0521   WBC 1.43* 5.46 12.82*   HGB 9.5* 10.3* 9.3*   HCT 29.0* 30.4* 28.1*   PLT 50* 43* 46*       Recent Labs   Lab 10/15/20  1520  10/18/20  0927 10/19/20  0316 10/19/20  1414 10/20/20  0337      < > 129* 130*  --  128*   K 2.6*   < > 4.0 3.7 4.1 3.6      < > 108 110  --  111*   CO2 17*   < > 12* 12*  --  9*   BUN 20   < > 25* 30*  --  30*   CREATININE 1.4   < > 1.7* 1.9*  --  2.0*   CALCIUM 7.9*   < > 7.8* 7.6*  --  6.9*   PROT 5.2*  --   --   --   --   --    BILITOT 0.7  --   --   --   --   --    ALKPHOS 65  --   --   --   --   --    ALT 14  --   --   --   --   --    AST 12  --   --   --   --   --     < > = values in this interval not displayed.     Micro:  Microbiology Results (last 7 days)     Procedure Component Value Units Date/Time    Urine Culture High Risk [[id number]] Collected: 10/20/20 0022    Order Status: Sent Specimen: Urine, Clean Catch Updated: 10/20/20 1055    Blood culture x two cultures. Draw prior to antibiotics. [[id number]] Collected: 10/15/20 1626    Order Status: Completed Specimen: Blood from Peripheral, Right Hand Updated: 10/20/20 0612     Blood Culture, Routine No Growth to date      No Growth to date      No Growth to date      No Growth to date      No Growth to date    Narrative:      Aerobic and anaerobic    Blood culture x two cultures. Draw prior to antibiotics. [[id number]] Collected: 10/15/20 1546    Order Status: Completed Specimen: Blood from Antecubital, Right Arm Updated: 10/20/20 0612     Blood Culture, Routine No Growth to date      No Growth to date      No Growth to date      No Growth to date      No Growth to date    Narrative:      Aerobic and anaerobic    Clostridium difficile EIA [[id number]]  (Abnormal) Collected: 10/16/20 0358    Order Status:  Completed Specimen: Stool Updated: 10/16/20 1057     C. diff Antigen Positive     C difficile Toxins A+B, EIA Positive     Comment: Testing not recommended for children <24 months old.           Imaging Reviewed:   KUBThree round radiodensities overlie the left upper quadrant may represent ingested pills or be in the patient's clothing.  A radiodense thread is noted over the right lower quadrant of uncertain significance.   CXR clear   CT abdomen and pelvis 10/16 :    Findings consistent with severe proctocolitis.    Transplanted kidney right side of the pelvis with mild pelvocaliectasis and perinephric stranding nonspecific.  No calcified stones seen Additional findings as detailed above including cystic lesion with in the native right kidney versus dilatation of collecting structure of the native right kidney.  Stones in the native right kidney.  Cystic lesion within the pancreas  Cardiology:    IMPRESSION & PLAN   1. Severe C. Difficile colitis   Prompted by oral antibiotics and/or chemotherapy   History of Cdiff 8/2019    2. PTLD, EBV related lymphoma, receiving chemotherapy and neutropenia  3. S/p renal transplant on immunosuppression  4. Chronic diarrhea      Recommendations:    IV flagyl and high dose oral vancomyin for Cdiff  I will add cholestyramine and probiotic for the next few days.   If he worsens, would consider IV tygacil,  and dfficid  Check CXr and kub tomorrow   Duonebs for breathing  He is still on small dose pressors---can he sit up?

## 2020-10-20 NOTE — CONSULTS
" INPATIENT NEPHROLOGY CONSULT   [facility] NEPHROLOGY    Alin Burkett  10/20/2020    Reason for consultation:    Acute kidney injury     Chief Complaint:   Chief Complaint   Patient presents with    Diarrhea     for the past 3 days,last received chemotherapy 6 days ago.     Abdominal Pain          History of Present Illness:    Per H and P    Patient has been having diarrhea with "jelly-like" consistency as well as crampiness in the abdomen.  Symptoms began roughly one week ago.  Also has had fever and malaise.  Appetite poor.  Fatigue as well.  He has been receiving chemo for PTLD; most recent cycle of CHOP was end of last week.  He has history of renal transplant four years ago and is currently on twice-a-day  Prograf.  He's had no cough, no unusual headache, no sinus pain, and no burning on urination.     10/20  Has some diarrhea and abdominal pain.  No nausea, chest pain, sob, new neuro symptoms, new joint pain.  He is very weak.    I told him that he had previously been seen by doctor Merchant and I would call him to see him.  He stated he didn't want to see Dr Merchant and requested that I become his nephrologist.      Plan of Care:       Assessment:    Acute kidney injury likely hemodynamically mediated  --urine sodium  --Avoid NSAIDS, Sales II inhibitors, and other non-essential nephrotoxic agents  --renal dose medication for crcl 10-50  --keep map above 55  --recheck ua with micro    nongap metabolic acidosis likely combination of GI losses and renal tubular defect (urine pH inappropriately high)  --check urine anion gap  --add exogenous base to iv fluids    S/p renal transplant  --tacrolimus level therapeutic    Hyponatremia  --urine osm  --avoid hypotonic iv piggy backs  --no ssri antidepressants or thiazide diuretics  --uric acid level                Thank you for allowing us to participate in this patient's care. We will continue to follow.    Vital Signs:  Temp Readings from Last 3 Encounters:   10/20/20 " 97.6 °F (36.4 °C) (Oral)   10/10/20 98.3 °F (36.8 °C) (Oral)   10/09/20 98.6 °F (37 °C)       Pulse Readings from Last 3 Encounters:   10/20/20 86   10/10/20 95   10/09/20 (!) 56       BP Readings from Last 3 Encounters:   10/20/20 100/67   10/10/20 102/63   10/09/20 115/76       Weight:  Wt Readings from Last 3 Encounters:   10/17/20 68.1 kg (150 lb 2.1 oz)   10/10/20 68.5 kg (151 lb)   10/09/20 66.4 kg (146 lb 6.2 oz)       Past Medical & Surgical History:  Past Medical History:   Diagnosis Date    Acidosis     Adrenal adenoma     Anemia associated with chronic renal failure     Arrhythmia, onset 2015    Awaiting organ transplant status 2013    Basal cell carcinoma 2012    left nasal tip    Blood type B+ 2013    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  Renal allograft biopsy 17 ([facility]): 21 glomeruli, none globally sclerosed, <5% interstitial fibrosis, no ACR, c4d negative, AVR CCT Type 2 (V1 lesion); plan THYMO     Dissecting aortic aneurysm (any part), abdominal     Diverticulosis     Encounter for blood transfusion     ESRD (end stage renal disease) 2010    H/O urethral stricture 2018    H/O: urethral stricture     History of AAA (abdominal aortic aneurysm) repair     History of urethral stricture 2018    Hypertension     Hypokalemia     Hypothyroidism 1/10/2014    Inguinal hernia bilateral, non-recurrent     Kidney stones     Organ transplant candidate 2013    Plantar warts 1/10/2014    Recurrent UTI 2017    S/P kidney transplant     Secondary hyperparathyroidism, renal     Thyroid disease        Past Surgical History:   Procedure Laterality Date    ABDOMINAL  SURGERY      exploratory lapatomy x 2    ABLATION N/A 8/22/2019    Procedure: ABLATION, SVT;  Surgeon: Emerson Mcmanus MD;  Location: [facility] EP LAB;  Service: Cardiology;  Laterality: N/A;  SVT, RFA, CARTO, anes, GP, 323    AORTA - SUPERIOR MESENTERIC ARTERY BYPASS GRAFT      BLADDER NECK RECONSTRUCTION      BLADDER SURGERY      COLONOSCOPY  10/10/2013    Dr. Gutierrez, repeat in 5 years    CYSTOSCOPY      CYSTOSCOPY N/A 12/19/2018    Procedure: CYSTOSCOPY;  Surgeon: Dewey Mann MD;  [facility];  Service: Urology;  Laterality: N/A;  45 min    CYSTOURETHROSCOPY WITH DIRECT VISION INTERNAL URETHROTOMY N/A 12/19/2018    Procedure: CYSTOSCOPY, WITH DIRECT VISION INTERNAL URETHROTOMY;  Surgeon: Dewey Mann MD;  Location: [address];  Service: Urology;  Laterality: N/A;    DILATION OF URETHRA N/A 12/19/2018    Procedure: DILATION, URETHRA;  Surgeon: Dewey Mann MD;  [address];  Service: Urology;  Laterality: N/A;    EXCISIONAL BIOPSY N/A 7/13/2020    Procedure: EXCISIONAL BIOPSY- LEFT INGUINAL NODE;  Surgeon: Vlad Epstein MD;  Location: [address];  Service: General;  Laterality: N/A;    FLEXIBLE CYSTOSCOPY N/A 11/6/2019    Procedure: CYSTOSCOPY, FLEXIBLE;  Surgeon: Dewey Mann MD;  Location: [address];  Service: Urology;  Laterality: N/A;    GASTROJEJUNOSTOMY      HEMORRHOID SURGERY      HERNIA REPAIR      INSERTION OF VENOUS ACCESS PORT Left 7/27/2020    Procedure: INSERTION, VENOUS ACCESS PORT;  Surgeon: Vlad Epstein MD;  Location: [address];  Service: General;  Laterality: Left;    KIDNEY TRANSPLANT      LEFT HEART CATHETERIZATION Left 8/20/2019    Procedure: Left heart cath;  Surgeon: Javan Oscar MD;  Location: [facility] CATH/EP LAB;  Service: Cardiology;  Laterality: Left;    LITHOTRIPSY      LYMPH NODE BIOPSY N/A 6/30/2020    Procedure: BIOPSY, LYMPH NODE;  Surgeon: [facility];  Location: [address]  [facility];  Service: General;  Laterality: N/A;  189 lymph node biopsy /ULTRASOUND    PERCUTANEOUS NEPHROLITHOTRIPSY      right  ESWL  10/31/12    right ESWL  6/26/12    URETHROPLASTY USING PATCH GRAFT N/A 11/6/2019    Procedure: URETHROPLASTY, USING PATCH GRAFT BUCCAL MUCOSA GRAFT;  Surgeon: Dewey Mann MD;  [facility];  Service: Urology;  Laterality: N/A;  3 HOURS       Past Social History:  Social History     Socioeconomic History    Marital status: Single     Spouse name: Not on file    Number of children: Not on file    Years of education: Not on file    Highest education level: Not on file   Occupational History     Employer: Disabled   Social Needs    Financial resource strain: Not on file    Food insecurity     Worry: Not on file     Inability: Not on file    Transportation needs     Medical: Not on file     Non-medical: Not on file   Tobacco Use    Smoking status: Former Smoker     Packs/day: 0.50     Years: 40.00     Pack years: 20.00     Types: Cigarettes     Quit date: 6/16/2010     Years since quitting: 10.3    Smokeless tobacco: Never Used   Substance and Sexual Activity    Alcohol use: Yes     Alcohol/week: 3.0 standard drinks     Types: 3 Cans of beer per week     Comment: occasional/social    Drug use: No     Comment: THC in youth    Sexual activity: Yes     Partners: Female     Birth control/protection: None   Lifestyle    Physical activity     Days per week: Not on file     Minutes per session: Not on file    Stress: Not on file   Relationships    Social connections     Talks on phone: Not on file     Gets together: Not on file     Attends Sabianist service: Not on file     Active member of club or organization: Not on file     Attends meetings of clubs or organizations: Not on file     Relationship status: Not on file   Other Topics Concern    Not on file   Social History Narrative    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      Current Outpatient Medications on File Prior to Encounter   Medication Sig Dispense Refill    allopurinoL (ZYLOPRIM) 300 MG tablet Take 1 tablet (300 mg total) by mouth once daily. 30 tablet 2    aspirin (ECOTRIN) 81 MG EC tablet Take 1 tablet (81 mg total) by mouth once daily.  0    calcitRIOL (ROCALTROL) 0.5 MCG Cap Take 1 capsule (0.5 mcg total) by mouth once daily. 30 capsule 11    cefpodoxime (VANTIN) 100 MG tablet Take 1 tablet (100 mg total) by mouth every 12 (twelve) hours. 60 tablet 3    COMBIGAN 0.2-0.5 % Drop Place 1 drop into both eyes 2 (two) times a day.       famotidine (PEPCID) 20 MG tablet TAKE 1 TABLET EVERY EVENING (Patient taking differently: Take 20 mg by mouth every evening. ) 90 tablet 3    ketoconazole (NIZORAL) 200 mg Tab TAKE ONE-HALF (1/2) TABLET ONCE DAILY (Patient taking differently: Take 100 mg by mouth once daily. ) 45 tablet 3    levothyroxine (SYNTHROID) 100 MCG tablet TAKE 1 TABLET DAILY (Patient taking differently: Take 100 mcg by mouth before breakfast. Administer on an empty stomach at least 30 minutes before food. If receiving tube feeds, HOLD tube feeds for 1 hour before and after levothyroxine administration.) 90 tablet 3    magnesium oxide (MAG-OX) 400 mg (241.3 mg magnesium) tablet Take 1 tablet (400 mg total) by mouth once daily. 90 tablet 3    multivitamin (ONE DAILY MULTIVITAMIN) per tablet Take 1 tablet by mouth once daily.      ondansetron (ZOFRAN-ODT) 8 MG TbDL Dissolve 1 tablet (8 mg total) by mouth every 12 (twelve) hours as needed. (Patient taking differently: Take 8 mg by mouth every 12 (twelve) hours as needed (nausea). ) 21 tablet 1    predniSONE (DELTASONE) 50 MG Tab Take 2 tablets (100 mg total) by mouth once daily. Take on days 2-5 of your chemotherapy cycles. 8 tablet 0    sodium bicarbonate 650 MG tablet Take 1 tablet (650 mg total) by mouth 2 (two) times daily. 540 tablet 3     "tacrolimus (PROGRAF) 1 MG Cap Take 3 capsules (3 mg total) by mouth every morning AND 2 capsules (2 mg total) every evening. Z94.0/Kidney Transplant on 11/26/16. 150 capsule 11    travoprost (TRAVATAN Z) 0.004 % ophthalmic solution Place 1 drop into both eyes every evening.        Scheduled Meds:   allopurinoL  300 mg Oral Daily    aspirin  81 mg Oral Daily    brimonidine-timoloL  1 drop Both Eyes Q12H    calcitRIOL  0.5 mcg Oral Daily    dicyclomine  10 mg Oral QID    dronabinoL  2.5 mg Oral BID    famotidine  20 mg Oral QHS    ferrous sulfate  325 mg Oral BID    levothyroxine  100 mcg Oral Before breakfast    metronidazole  500 mg Intravenous Q8H    tacrolimus  2 mg Oral Daily PM    tacrolimus  3 mg Oral Daily AM    travoprost  1 drop Both Eyes QHS    vancomycin  500 mg Oral Q6H     Continuous Infusions:   sodium chloride 0.9% 150 mL/hr at 10/20/20 0729    norepinephrine bitartrate-D5W 0.02 mcg/kg/min (10/19/20 1467)     PRN Meds:.acetaminophen, hyoscyamine, magnesium sulfate IVPB, magnesium sulfate IVPB, metoclopramide HCl, morphine, ondansetron, potassium chloride in water **AND** potassium chloride in water **AND** potassium chloride in water, promethazine, sodium chloride 0.9%    Allergies:  Patient has no known allergies.    Past Family History:  Reviewed; refer to Hospitalist Admission Note    Review of Systems:  Review of Systems - All 14 systems reviewed and negative, except as noted in HPI    Physical Exam:    /67   Pulse 86   Temp 97.6 °F (36.4 °C) (Oral)   Resp (!) 28   Ht 5' 11" (1.803 m)   Wt 68.1 kg (150 lb 2.1 oz)   SpO2 99%   BMI 20.94 kg/m²     General Appearance:    Alert, cooperative, no distress, appears stated age   Head:    Normocephalic, without obvious abnormality, atraumatic   Eyes:    PER, conjunctiva/corneas clear, EOM's intact in both eyes        Throat:   Lips, mucosa, and tongue normal; teeth and gums normal   Back:     Symmetric, no curvature, ROM " normal, no CVA tenderness   Lungs:     Clear to auscultation bilaterally, respirations unlabored   Chest wall:    No tenderness or deformity   Heart:    Regular rate and rhythm, S1 and S2 normal, no murmur, rub   or gallop   Abdomen:     Tender to palpation   Extremities:   Extremities normal, atraumatic, no cyanosis or edema   Pulses:   2+ and symmetric all extremities   MSK:   No joint or muscle swelling, tenderness or deformity   Skin:   Skin color, texture, turgor normal, no rashes or lesions   Neurologic:   CNII-XII intact, normal strength and sensation       Throughout.  No flap     Results:  Lab Results   Component Value Date     (L) 10/20/2020    K 3.6 10/20/2020     (H) 10/20/2020    CO2 9 (LL) 10/20/2020    BUN 30 (H) 10/20/2020    CREATININE 2.0 (H) 10/20/2020    CALCIUM 6.9 (LL) 10/20/2020    ANIONGAP 8 10/20/2020    ESTGFRAFRICA 38 (A) 10/20/2020    EGFRNONAA 33 (A) 10/20/2020       Lab Results   Component Value Date    CALCIUM 6.9 (LL) 10/20/2020    PHOS 2.0 (L) 10/15/2020       Recent Labs   Lab 10/20/20  0521   WBC 12.82*   RBC 2.92*   HGB 9.3*   HCT 28.1*   PLT 46*   MCV 96   MCH 31.8*   MCHC 33.1          I have personally reviewed pertinent radiological imaging and reports.

## 2020-10-21 PROBLEM — N17.9 ACUTE RENAL FAILURE SUPERIMPOSED ON STAGE 3 CHRONIC KIDNEY DISEASE: Status: ACTIVE | Noted: 2017-02-20

## 2020-10-21 PROBLEM — N18.30 ACUTE RENAL FAILURE SUPERIMPOSED ON STAGE 3 CHRONIC KIDNEY DISEASE: Status: ACTIVE | Noted: 2017-02-20

## 2020-10-21 NOTE — TELEPHONE ENCOUNTER
----- Message from Sarika Reis sent at 10/21/2020  8:16 AM CDT -----  Regarding: advice  Contact: [phone]  Pt daughter is calling to consult with the Dr about her dads condition. Pt is currently in the ICU at [facility]. Pt daughter is asking for advice if she should get her father transferred to the [facility] campus. Please contact pt daughter  Yohannes Donaldson [phone]

## 2020-10-21 NOTE — ASSESSMENT & PLAN NOTE
Resolved Give supplement and monitor level.    Potassium   Date Value Ref Range Status   10/21/2020 4.1 3.5 - 5.1 mmol/L Final   10/20/2020 4.3 3.5 - 5.1 mmol/L Final

## 2020-10-21 NOTE — PROGRESS NOTES
"Progress Note  Infectious Disease    Reason for Consult:  C difficile colitis, neutropenia, sepsis    HPI: Alin Burkett is a   70 y.o. male who is status post renal transplant and is receiving chemotherapy for diffuse large B-cell lymphoma, presented to the emergency room on 10/15 with worsening of chronic diarrhea, abdominal cramps of 1 weeks duration.  He was found to be neutropenic with a white blood cell count of 0.2, volume depleted, hypokalemic, was cultured and given fluid resuscitation and vancomycin and cefepime.  He was admitted to the intensive care unit. He was recently given cefpodoxime to take prophylactically associated with his chemotherapy for recurring urinary tract infections.  Most recent positive urine culture was September 18th with E coli sensitive to 3rd generation cephalosporins carbapenems  He has had a hectic fever, stool for C difficile toxin was obtained and was resulted as positive today.  he has had a positive C difficile test before, August 2019.  White blood cells remain depressed at 0.1, platelets are depressed at 91,000, creatinine 1.6.  CT scan of the abdomen obtained last night shows severe proctocolitis without megacolon. Neupogen has been ordered. He is discouraged from eating by severe cramps.      10/17/2020 tmax is improved. Continues to be neutropenic Continues to have cramping, intermittent, abdominal pain. + diarrhea "lost count how many" He had refused vancomycin in am but decided to take it now  10/18/2020 afebrile,(103 on 16th)  ANC improved on granix 0---1029) he is feeling much better today.  Less abdominal pain.  Less loose stools.  10/19/2020.  Afebrile.  T-max 99°.  Less need for pressors.  WBC 5.  Creatinine slightly worse at 1.9.  Discussed with nurse.  Patient may have had some hallucinations earlier  10/20/2020.  Afebrile. Less abdominal pain. Less diarrhea. He feels his abdomen is still distended and does not allow him to take a deep breath. KUB  Is read " as below, I feel that transvere colon is a little bigger today. Creatinine has worsened. Bicarb is 9. Nephrologist is giving bicarb drip.  Las Vancomycin iv and cefepime were  given on 18   Tolerating vancomycin by mouth and metronidazole    10/21: interim reviewed d/w Dr. Jiménez. Requiring bicarb drip. WBC 14 after neupogen, platelets better. Cr stable. Severe hypoalbuminemia. KUB not ominous, CXR with blunted left CPA. Wife reports he is sleeping all of the time and not eating. I aroused him and he agreed to eat some pudding    Antibiotics (From admission, onward)    Start     Stop Route Frequency Ordered    10/17/20 0000  vancomycin 25 mg/mL oral solution 500 mg  (C. difficile Infection (CDI) Treatment Order Panel)      10/26 0559 Oral Every 6 hours 10/16/20 1755    10/16/20 1900  metronidazole IVPB 500 mg      -- IV Every 8 hours (non-standard times) 10/16/20 1755             EXAM & DIAGNOSTICS REVIEWED:   Vitals:     Temp:  [98.2 °F (36.8 °C)-98.8 °F (37.1 °C)]   Temp: 98.8 °F (37.1 °C) (10/21/20 1630)  Pulse: 101 (10/21/20 1745)  Resp: (!) 35 (10/21/20 1745)  BP: 120/84 (10/21/20 1745)  SpO2: (!) 86 % (10/21/20 1745)    Intake/Output Summary (Last 24 hours) at 10/21/2020 1813  Last data filed at 10/21/2020 1500  Gross per 24 hour   Intake 1873.75 ml   Output 1375 ml   Net 498.75 ml       General:  In NAD. Arousable and attentive, cooperative,looks better than last visit.    Eyes:  Anicteric, EOMI  ENT:  No ulcers, exudates, thrush, nares patent, dentition  good  Neck:  Supple   Lungs: Clear, no consolidation, rales, wheezes, rub  Heart:  RRR, no gallop/murmur/rub noted  Abd:  Mild distention, hypoactive BS, no masses or organomegaly appreciated. Still generally tender throughout. Multiple scars from prior surgeries  :  Voids/  urine clear, no flank tenderness  Musc:  Joints without effusion, swelling, erythema, synovitis,    Skin:  No rashes. No palmar or plantar lesions. No subungual  petechiae  Wound:   Neuro: Alert, attentive, speech fluent, face symmetric, moves all extremities, no focal weakness.    Psych:   cooperative, pleasant      Extrem: No edema, erythema, phlebitis, cellulitis, warm and well perfused  VAD:  portacath     Isolation:  Special contact    Lines/Tubes/Drains:    General Labs reviewed:  Recent Labs   Lab 10/19/20  0316 10/20/20  0521 10/21/20  0354   WBC 5.46 12.82* 14.81*   HGB 10.3* 9.3* 10.1*   HCT 30.4* 28.1* 30.0*   PLT 43* 46* 76*       Recent Labs   Lab 10/15/20  1520  10/20/20  0337 10/20/20  1515 10/20/20  2352 10/21/20  0354      < > 128*  --  132* 132*   K 2.6*   < > 3.6 4.4 4.3 4.1      < > 111*  --  112* 111*   CO2 17*   < > 9*  --  12* 11*   BUN 20   < > 30*  --  37* 35*   CREATININE 1.4   < > 2.0*  --  2.5* 2.6*   CALCIUM 7.9*   < > 6.9*  --  7.9* 7.9*   PROT 5.2*  --   --   --  4.2*  --    BILITOT 0.7  --   --   --  0.5  --    ALKPHOS 65  --   --   --  154*  --    ALT 14  --   --   --  18  --    AST 12  --   --   --  21  --     < > = values in this interval not displayed.     Micro:  Microbiology Results (last 7 days)     Procedure Component Value Units Date/Time    Blood culture x two cultures. Draw prior to antibiotics. [[id number]] Collected: 10/15/20 1626    Order Status: Completed Specimen: Blood from Peripheral, Right Hand Updated: 10/21/20 0612     Blood Culture, Routine No growth after 5 days.    Narrative:      Aerobic and anaerobic    Blood culture x two cultures. Draw prior to antibiotics. [[id number]] Collected: 10/15/20 1546    Order Status: Completed Specimen: Blood from Antecubital, Right Arm Updated: 10/21/20 0612     Blood Culture, Routine No growth after 5 days.    Narrative:      Aerobic and anaerobic    Urine Culture High Risk [[id number]] Collected: 10/20/20 0022    Order Status: Sent Specimen: Urine, Clean Catch Updated: 10/20/20 1055    Clostridium difficile EIA [[id number]]  (Abnormal) Collected: 10/16/20 0358    Order Status:  Completed Specimen: Stool Updated: 10/16/20 1057     C. diff Antigen Positive     C difficile Toxins A+B, EIA Positive     Comment: Testing not recommended for children <24 months old.           Imaging Reviewed:   KUBThree round radiodensities overlie the left upper quadrant may represent ingested pills or be in the patient's clothing.  A radiodense thread is noted over the right lower quadrant of uncertain significance.   CXR clear   CT abdomen and pelvis 10/16 :    Findings consistent with severe proctocolitis.    Transplanted kidney right side of the pelvis with mild pelvocaliectasis and perinephric stranding nonspecific.  No calcified stones seen Additional findings as detailed above including cystic lesion with in the native right kidney versus dilatation of collecting structure of the native right kidney.  Stones in the native right kidney.  Cystic lesion within the pancreas  Cardiology:    IMPRESSION & PLAN   1. Severe C. Difficile colitis, improved   Prompted by oral antibiotics and/or chemotherapy   History of Cdiff 8/2019   Metabolic acidosis, requiring IV bicarb    2. PTLD, EBV related lymphoma, receiving chemotherapy      neutropenia, resolved  3. S/p renal transplant on immunosuppression, [facility]  4. Chronic diarrhea  5. History of recurrent UTIs      Recommendations:  ?would midodrine help  IV flagyl and high dose oral vancomyin for Cdiff    cholestyramine and probiotic   Needs aggressive nutritional support

## 2020-10-21 NOTE — PROGRESS NOTES
" INPATIENT NEPHROLOGY PROGRESS  [facility] NEPHROLOGY    Alin Burkett  10/21/2020    Reason for consultation:    Acute kidney injury     Chief Complaint:   Chief Complaint   Patient presents with    Diarrhea     for the past 3 days,last received chemotherapy 6 days ago.     Abdominal Pain          History of Present Illness:    Per H and P    Patient has been having diarrhea with "jelly-like" consistency as well as crampiness in the abdomen.  Symptoms began roughly one week ago.  Also has had fever and malaise.  Appetite poor.  Fatigue as well.  He has been receiving chemo for PTLD; most recent cycle of CHOP was end of last week.  He has history of renal transplant four years ago and is currently on twice-a-day  Prograf.  He's had no cough, no unusual headache, no sinus pain, and no burning on urination.     10/20  Has some diarrhea and abdominal pain.  No nausea, chest pain, sob, new neuro symptoms, new joint pain.  He is very weak.    I told him that he had previously been seen by doctor Merchant and I would call him to see him.  He stated he didn't want to see Dr Merchant and requested that I become his nephrologist.    10/21  Not much change in renal function since yesterday. UOP 1.3L.  Sleeping quietly.    Plan of Care:       Assessment:    Acute kidney injury likely hemodynamically mediated  --urine sodium  --Avoid NSAIDS, Sales II inhibitors, and other non-essential nephrotoxic agents  --renal dose medication for crcl 10-50  --keep map above 55  --recheck ua with micro    nongap metabolic acidosis likely combination of GI losses and renal tubular defect (urine pH inappropriately high)  --check urine anion gap  --add exogenous base to iv fluids    S/p renal transplant  --tacrolimus level therapeutic    Hyponatremia  --urine osm  --avoid hypotonic iv piggy backs  --no ssri antidepressants or thiazide diuretics  --uric acid level                Thank you for allowing us to participate in this patient's care. We " will continue to follow.    Vital Signs:  Temp Readings from Last 3 Encounters:   10/21/20 98.4 °F (36.9 °C) (Oral)   10/10/20 98.3 °F (36.8 °C) (Oral)   10/09/20 98.6 °F (37 °C)       Pulse Readings from Last 3 Encounters:   10/21/20 98   10/10/20 95   10/09/20 (!) 56       BP Readings from Last 3 Encounters:   10/21/20 100/74   10/10/20 102/63   10/09/20 115/76       Weight:  Wt Readings from Last 3 Encounters:   10/21/20 78.3 kg (172 lb 9.9 oz)   10/10/20 68.5 kg (151 lb)   10/09/20 66.4 kg (146 lb 6.2 oz)       Past Medical & Surgical History:  Past Medical History:   Diagnosis Date    Acidosis     Adrenal adenoma     Anemia associated with chronic renal failure     Arrhythmia, onset 2015    Awaiting organ transplant status 2013    Basal cell carcinoma 2012    left nasal tip    Blood type B+ 2013    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  Renal allograft biopsy 17 (DIVINE): 21 glomeruli, none globally sclerosed, <5% interstitial fibrosis, no ACR, c4d negative, AVR CCT Type 2 (V1 lesion); plan THYMO     Dissecting aortic aneurysm (any part), abdominal     Diverticulosis     Encounter for blood transfusion     ESRD (end stage renal disease) 2010    H/O urethral stricture 2018    H/O: urethral stricture     History of AAA (abdominal aortic aneurysm) repair     History of urethral stricture 2018    Hypertension     Hypokalemia     Hypothyroidism 1/10/2014    Inguinal hernia bilateral, non-recurrent     Kidney stones     Organ transplant candidate 2013    Plantar warts 1/10/2014    Recurrent UTI 2017    S/P kidney transplant     Secondary hyperparathyroidism, renal      Thyroid disease        Past Surgical History:   Procedure Laterality Date    ABDOMINAL SURGERY      exploratory lapatomy x 2    ABLATION N/A 8/22/2019    Procedure: ABLATION, SVT;  Surgeon: Emerson Mcmanus MD;  Location: [facility] EP LAB;  Service: Cardiology;  Laterality: N/A;  SVT, RFA, CARTO, anes, GP, 323    AORTA - SUPERIOR MESENTERIC ARTERY BYPASS GRAFT      BLADDER NECK RECONSTRUCTION      BLADDER SURGERY      COLONOSCOPY  10/10/2013    Dr. Gutierrez, repeat in 5 years    CYSTOSCOPY      CYSTOSCOPY N/A 12/19/2018    Procedure: CYSTOSCOPY;  Surgeon: Dewey Mann MD;  [facility];  Service: Urology;  Laterality: N/A;  45 min    CYSTOURETHROSCOPY WITH DIRECT VISION INTERNAL URETHROTOMY N/A 12/19/2018    Procedure: CYSTOSCOPY, WITH DIRECT VISION INTERNAL URETHROTOMY;  Surgeon: Dewey Mann MD;  [facility];  Service: Urology;  Laterality: N/A;    DILATION OF URETHRA N/A 12/19/2018    Procedure: DILATION, URETHRA;  Surgeon: Dewey Mann MD;  [address]  Service: Urology;  Laterality: N/A;    EXCISIONAL BIOPSY N/A 7/13/2020    Procedure: EXCISIONAL BIOPSY- LEFT INGUINAL NODE;  Surgeon: Vlad Epstein MD;  [address]  Service: General;  Laterality: N/A;    FLEXIBLE CYSTOSCOPY N/A 11/6/2019    Procedure: CYSTOSCOPY, FLEXIBLE;  Surgeon: Dewey Mann MD;  [facility];  Service: Urology;  Laterality: N/A;    GASTROJEJUNOSTOMY      HEMORRHOID SURGERY      HERNIA REPAIR      INSERTION OF VENOUS ACCESS PORT Left 7/27/2020    Procedure: INSERTION, VENOUS ACCESS PORT;  Surgeon: Vlad Epstein MD;  [facility];  Service: General;  Laterality: Left;    KIDNEY TRANSPLANT      LEFT HEART CATHETERIZATION Left 8/20/2019    Procedure: Left heart cath;  Surgeon: Javan Oscar MD;  Location: [facility] CATH/EP LAB;  Service: Cardiology;  Laterality: Left;    LITHOTRIPSY      LYMPH NODE BIOPSY N/A 6/30/2020     Procedure: BIOPSY, LYMPH NODE;  Surgeon: Ella Diagnostic Provider;  [facility];  Service: General;  Laterality: N/A;  189 lymph node biopsy /ULTRASOUND    PERCUTANEOUS NEPHROLITHOTRIPSY      right  ESWL  10/31/12    right ESWL  6/26/12    URETHROPLASTY USING PATCH GRAFT N/A 11/6/2019    Procedure: URETHROPLASTY, USING PATCH GRAFT BUCCAL MUCOSA GRAFT;  Surgeon: Dewey Mann MD;  [facility];  Service: Urology;  Laterality: N/A;  3 HOURS       Past Social History:  Social History     Socioeconomic History    Marital status: Single     Spouse name: Not on file    Number of children: Not on file    Years of education: Not on file    Highest education level: Not on file   Occupational History     Employer: Disabled   Social Needs    Financial resource strain: Not on file    Food insecurity     Worry: Not on file     Inability: Not on file    Transportation needs     Medical: Not on file     Non-medical: Not on file   Tobacco Use    Smoking status: Former Smoker     Packs/day: 0.50     Years: 40.00     Pack years: 20.00     Types: Cigarettes     Quit date: 6/16/2010     Years since quitting: 10.3    Smokeless tobacco: Never Used   Substance and Sexual Activity    Alcohol use: Yes     Alcohol/week: 3.0 standard drinks     Types: 3 Cans of beer per week     Comment: occasional/social    Drug use: No     Comment: THC in youth    Sexual activity: Yes     Partners: Female     Birth control/protection: None   Lifestyle    Physical activity     Days per week: Not on file     Minutes per session: Not on file    Stress: Not on file   Relationships    Social connections     Talks on phone: Not on file     Gets together: Not on file     Attends Christian service: Not on file     Active member of club or organization: Not on file     Attends meetings of clubs or organizations: Not on file     Relationship status: Not on file   Other Topics Concern    Not on file   Social History  Narrative    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      Current Outpatient Medications on File Prior to Encounter   Medication Sig Dispense Refill    allopurinoL (ZYLOPRIM) 300 MG tablet Take 1 tablet (300 mg total) by mouth once daily. 30 tablet 2    aspirin (ECOTRIN) 81 MG EC tablet Take 1 tablet (81 mg total) by mouth once daily.  0    calcitRIOL (ROCALTROL) 0.5 MCG Cap Take 1 capsule (0.5 mcg total) by mouth once daily. 30 capsule 11    cefpodoxime (VANTIN) 100 MG tablet Take 1 tablet (100 mg total) by mouth every 12 (twelve) hours. 60 tablet 3    COMBIGAN 0.2-0.5 % Drop Place 1 drop into both eyes 2 (two) times a day.       famotidine (PEPCID) 20 MG tablet TAKE 1 TABLET EVERY EVENING (Patient taking differently: Take 20 mg by mouth every evening. ) 90 tablet 3    ketoconazole (NIZORAL) 200 mg Tab TAKE ONE-HALF (1/2) TABLET ONCE DAILY (Patient taking differently: Take 100 mg by mouth once daily. ) 45 tablet 3    levothyroxine (SYNTHROID) 100 MCG tablet TAKE 1 TABLET DAILY (Patient taking differently: Take 100 mcg by mouth before breakfast. Administer on an empty stomach at least 30 minutes before food. If receiving tube feeds, HOLD tube feeds for 1 hour before and after levothyroxine administration.) 90 tablet 3    magnesium oxide (MAG-OX) 400 mg (241.3 mg magnesium) tablet Take 1 tablet (400 mg total) by mouth once daily. 90 tablet 3    multivitamin (ONE DAILY MULTIVITAMIN) per tablet Take 1 tablet by mouth once daily.      ondansetron (ZOFRAN-ODT) 8 MG TbDL Dissolve 1 tablet (8 mg total) by mouth every 12 (twelve) hours as needed. (Patient taking differently: Take 8 mg by mouth every 12 (twelve) hours as needed (nausea). ) 21 tablet 1    predniSONE (DELTASONE) 50 MG Tab Take 2 tablets (100 mg total) by mouth once daily. Take on days 2-5 of your chemotherapy cycles. 8 tablet 0    sodium  "bicarbonate 650 MG tablet Take 1 tablet (650 mg total) by mouth 2 (two) times daily. 540 tablet 3    tacrolimus (PROGRAF) 1 MG Cap Take 3 capsules (3 mg total) by mouth every morning AND 2 capsules (2 mg total) every evening. Z94.0/Kidney Transplant on 11/26/16. 150 capsule 11    travoprost (TRAVATAN Z) 0.004 % ophthalmic solution Place 1 drop into both eyes every evening.        Scheduled Meds:   allopurinoL  300 mg Oral Daily    aspirin  81 mg Oral Daily    brimonidine-timoloL  1 drop Both Eyes Q12H    calcitRIOL  0.5 mcg Oral Daily    cholestyramine-aspartame  1 packet Oral BID    dicyclomine  10 mg Oral QID    dronabinoL  2.5 mg Oral BID    famotidine  20 mg Oral QHS    ferrous sulfate  325 mg Oral BID    Lactobacillus acidoph-L.bulgar  4 tablet Oral TID WM    levothyroxine  100 mcg Oral Before breakfast    metronidazole  500 mg Intravenous Q8H    tacrolimus  2 mg Oral Daily PM    tacrolimus  3 mg Oral Daily AM    travoprost  1 drop Both Eyes QHS    vancomycin  500 mg Oral Q6H     Continuous Infusions:   norepinephrine bitartrate-D5W 0.05 mcg/kg/min (10/21/20 0745)    custom IV infusion builder 100 mL/hr at 10/21/20 0134     PRN Meds:.acetaminophen, albuterol-ipratropium, hyoscyamine, magnesium sulfate IVPB, magnesium sulfate IVPB, metoclopramide HCl, morphine, ondansetron, potassium chloride in water **AND** potassium chloride in water **AND** potassium chloride in water, promethazine, sodium chloride 0.9%    Allergies:  Patient has no known allergies.    Past Family History:  Reviewed; refer to Hospitalist Admission Note    Review of Systems:  Review of Systems - All 14 systems reviewed and negative, except as noted in HPI    Physical Exam:    /74   Pulse 98   Temp 98.4 °F (36.9 °C) (Oral)   Resp 15   Ht 5' 11" (1.803 m)   Wt 78.3 kg (172 lb 9.9 oz)   SpO2 100%   BMI 24.08 kg/m²     General Appearance:    Alert, cooperative, no distress, appears stated age   Head:    " Normocephalic, without obvious abnormality, atraumatic   Eyes:    PER, conjunctiva/corneas clear, EOM's intact in both eyes        Throat:   Lips, mucosa, and tongue normal; teeth and gums normal   Back:     Symmetric, no curvature, ROM normal, no CVA tenderness   Lungs:     Clear to auscultation bilaterally, respirations unlabored   Chest wall:    No tenderness or deformity   Heart:    Regular rate and rhythm, S1 and S2 normal, no murmur, rub   or gallop   Abdomen:     Tender to palpation   Extremities:   Extremities normal, atraumatic, no cyanosis or edema   Pulses:   2+ and symmetric all extremities   MSK:   No joint or muscle swelling, tenderness or deformity   Skin:   Skin color, texture, turgor normal, no rashes or lesions   Neurologic:   CNII-XII intact, normal strength and sensation       Throughout.  No flap     Results:  Lab Results   Component Value Date     (L) 10/21/2020    K 4.1 10/21/2020     (H) 10/21/2020    CO2 11 (L) 10/21/2020    BUN 35 (H) 10/21/2020    CREATININE 2.6 (H) 10/21/2020    CALCIUM 7.9 (L) 10/21/2020    ANIONGAP 10 10/21/2020    ESTGFRAFRICA 28 (A) 10/21/2020    EGFRNONAA 24 (A) 10/21/2020       Lab Results   Component Value Date    CALCIUM 7.9 (L) 10/21/2020    PHOS 2.8 10/21/2020       Recent Labs   Lab 10/21/20  0354   WBC 14.81*   RBC 3.15*   HGB 10.1*   HCT 30.0*   PLT 76*   MCV 95   MCH 32.1*   MCHC 33.7          I have personally reviewed pertinent radiological imaging and reports.

## 2020-10-21 NOTE — TELEPHONE ENCOUNTER
----- Message from Sarika Reis sent at 10/21/2020  8:11 AM CDT -----  Regarding: advice  Contact: pt daughter  [phone]  Pt daughter is calling to consult with the Dr about her dads condition. Pt is currently in the ICU at [facility]. Pt daughter is asking for advice if she should get her father transferred to the [facility]. Please contact pt daughter  Yohannes Donaldson [phone]

## 2020-10-21 NOTE — ASSESSMENT & PLAN NOTE
Acidosis worsening most likely non and an metabolic acidosis secondary to diarrhea and renal tubular disease  Nephrology on the case  Will continue sodium bicarb

## 2020-10-21 NOTE — PLAN OF CARE
Pt remains in ICU on levophed and bicarb gtt. Minimal abdominal pain this shift. Alert, oriented. Family at bedside. Tolerating oral meds and diet well. Fair appetite this shift. Contact precautions for CDiff maintained. Room air. White count improved today. PT/OT ordered for evaluation. Updated wife on poc. She also stated she missed a call from the doctor. I told her I would have the doctor return her call and if she couldn't answer to try back early afternoon. MD stated he tried to call her with no answer. Safety maintained.

## 2020-10-21 NOTE — PROGRESS NOTES
"[facility]  Progress Note    Patient Name: Alin Burkett  MRN: [id number]  Patient Class: IP- Inpatient   Admission Date: 10/15/2020  Length of Stay: 6 days  Attending Physician: Julia Summers MD  Primary Care Provider: Carmen Krueger MD        Subjective:     Principal Problem:Septic shock        HPI:  Patient has been having diarrhea with "jelly-like" consistency as well as crampiness in the abdomen.  Symptoms began roughly one week ago.  Also has had fever and malaise.  Appetite poor.  Fatigue as well.  He has been receiving chemo for PTLD; most recent cycle of CHOP was end of last week.  He has history of renal transplant four years ago and is currently on twice-a-day  Prograf.  He's had no cough, no unusual headache, no sinus pain, and no burning on urination.  He feels that he's probably dehydrated.    Overview/Hospital Course:  No notes on file    Interval History:  Continues to have loose stools.  Mental status improved.  Patient's bicarb trending down appears to have non anion metabolic acidosis.     Review of Systems   Constitutional: Positive for chills and fatigue. Negative for appetite change and fever.   HENT: Negative for congestion, hearing loss, rhinorrhea, sore throat, trouble swallowing and voice change.    Respiratory: Negative for cough, chest tightness, shortness of breath and wheezing.    Cardiovascular: Negative for chest pain, palpitations and leg swelling.   Gastrointestinal: Positive for abdominal pain and diarrhea. Negative for blood in stool, nausea and vomiting.   Genitourinary: Negative for difficulty urinating, frequency, hematuria and urgency.   Musculoskeletal: Negative for back pain, joint swelling and neck stiffness.   Skin: Negative for pallor and rash.   Neurological: Negative for tremors, seizures, syncope, speech difficulty, weakness, numbness and headaches.   Hematological: Negative for adenopathy.   Psychiatric/Behavioral: Negative " for agitation, behavioral problems, confusion and sleep disturbance.     Objective:     Vital Signs (Most Recent):  Temp: 98.4 °F (36.9 °C) (10/21/20 0745)  Pulse: 98 (10/21/20 1000)  Resp: 16 (10/21/20 1000)  BP: 102/72 (10/21/20 1000)  SpO2: 99 % (10/21/20 1000) Vital Signs (24h Range):  Temp:  [97.3 °F (36.3 °C)-98.6 °F (37 °C)] 98.4 °F (36.9 °C)  Pulse:  [] 98  Resp:  [13-37] 16  SpO2:  [85 %-100 %] 99 %  BP: ()/() 102/72     Weight: 78.3 kg (172 lb 9.9 oz)  Body mass index is 24.08 kg/m².    Intake/Output Summary (Last 24 hours) at 10/21/2020 1100  Last data filed at 10/21/2020 0700  Gross per 24 hour   Intake 3622.08 ml   Output 1100 ml   Net 2522.08 ml      Physical Exam  Vitals signs and nursing note reviewed.   Constitutional:       General: He is not in acute distress.     Appearance: He is ill-appearing. He is not diaphoretic.   HENT:      Head: Normocephalic and atraumatic.      Mouth/Throat:      Mouth: Mucous membranes are dry.   Eyes:      General: No scleral icterus.        Right eye: No discharge.         Left eye: No discharge.      Pupils: Pupils are equal, round, and reactive to light.   Neck:      Vascular: No JVD.   Cardiovascular:      Rate and Rhythm: Normal rate and regular rhythm.   Pulmonary:      Effort: Pulmonary effort is normal.      Breath sounds: Normal breath sounds.   Abdominal:      General: Bowel sounds are normal. There is no distension.      Palpations: Abdomen is soft.      Tenderness: There is abdominal tenderness in the left upper quadrant and left lower quadrant. There is no rebound.   Musculoskeletal:      Right lower leg: No edema.      Left lower leg: No edema.   Skin:     General: Skin is warm.      Capillary Refill: Capillary refill takes less than 2 seconds.      Findings: No rash.   Neurological:      General: No focal deficit present.      Mental Status: He is alert.      Cranial Nerves: No cranial nerve deficit.   Psychiatric:         Mood and  Affect: Mood normal.         Significant Labs:   BMP:   Recent Labs   Lab 10/21/20  0354   GLU 92   *   K 4.1   *   CO2 11*   BUN 35*   CREATININE 2.6*   CALCIUM 7.9*     CBC:   Recent Labs   Lab 10/20/20  0521 10/21/20  0354   WBC 12.82* 14.81*   HGB 9.3* 10.1*   HCT 28.1* 30.0*   PLT 46* 76*       Significant Imaging: I have reviewed all pertinent imaging results/findings within the past 24 hours.      Assessment/Plan:      * Septic shock  Source is likely colitis from C.difficile.  UA does not appear infected.  Empiric antibiotcs: vancomycin and cefepime.  IV and oral vancomycin.  Continue norepinephrine drip.  Lactate level is ok.      Acute renal failure superimposed on stage 3 chronic kidney disease  Creatinine trending up   BMP reviewed- noted Estimated Creatinine Clearance: 28.6 mL/min (A) (based on SCr of 2.6 mg/dL (H)). according to latest data.   Monitor UOP and serial BMP and adjust therapy as needed. Renally dose meds.      Moderate malnutrition  Nutrition consulted. Body mass index is 24.08 kg/m².. Encourage maximal PO intake. Diet supplementation ordered per nutrition approval. Will encourage PO and monitor closely for weight changes.      Anemia in stage 3 chronic kidney disease  Patient's anemia is currently controlled. Has not received any PRBCs to date.. Etiology likely d/t  anemia of chronic disease  Current CBC reviewed-   Lab Results   Component Value Date    HGB 10.1 (L) 10/21/2020    HCT 30.0 (L) 10/21/2020     Monitor serial CBC and transfuse if patient becomes hemodynamically unstable, symptomatic or H/H drops below 7/21.         Anemia due to chemotherapy  Patient's anemia is currently controlled. Has not received any PRBCs to date.. Etiology likely d/t chemo  Current CBC reviewed-   Lab Results   Component Value Date    HGB 10.1 (L) 10/21/2020    HCT 30.0 (L) 10/21/2020     Monitor serial CBC and transfuse if patient becomes hemodynamically unstable, symptomatic or H/H drops  below 7/21.         Hypokalemia due to excessive gastrointestinal loss of potassium  Resolved Give supplement and monitor level.    Potassium   Date Value Ref Range Status   10/21/2020 4.1 3.5 - 5.1 mmol/L Final   10/20/2020 4.3 3.5 - 5.1 mmol/L Final         Febrile neutropenia  Will treat empirically with  Vancomycin.  Improved  Monitor leukocyte count.  Hematology on the case  Granix given today.;    WBC   Date Value Ref Range Status   10/21/2020 14.81 (H) 3.90 - 12.70 K/uL Final       Post-transplant lymphoproliferative disorder (PTLD)  Has been receiving chemo for this.      -donor kidney transplant  Continue Prograf at usual dose.      Acquired hypothyroidism  Continue levothyroxine.    Metabolic acidosis  Acidosis worsening most likely non and an metabolic acidosis secondary to diarrhea and renal tubular disease  Nephrology on the case  Will continue sodium bicarb      VTE Risk Mitigation (From admission, onward)         Ordered     IP VTE LOW RISK PATIENT  Once      10/15/20 2220                Discharge Planning   TRINH:      Code Status: Full Code   Is the patient medically ready for discharge?:     Reason for patient still in hospital (select all that apply): Patient trending condition and Treatment  Discharge Plan A: Home with family            Critical care time spent on the evaluation and treatment of severe organ dysfunction, review of pertinent labs and imaging studies, discussions with consulting providers and discussions with patient/family: 60 minutes.      Julia Summers MD  Department of Hospital Medicine   [facility]

## 2020-10-21 NOTE — PLAN OF CARE
Pt remained free of falls or injuries this shift.  Pt was groggy at beginning of shift but began to be more clear headed as shift went on.  Pt requested morphine for 10/10 shoulder pain early in shift. Pt wife updated on status and need for morphine at the time it was administered.  All questions and concerns addressed.  Levo titrated as needed to maintain BP. Bath given. Will continue to monitor.

## 2020-10-21 NOTE — PROGRESS NOTES
[facility]  Patient Name: Alin Burkett  : 1950  Age: 70 y.o.  Sex: male  MRN: [id number]  Admission Date: 10/15/2020  Hospital Length of Stay: 6 days  Code Status: Full Code   Admitting Provider: Brennan Decker MD  Attending Provider: Julia Summers MD  Primary Care Physician: Carmen Krueger MD  Full Code  Subjective:     Date of Visit: 10/21/2020             Principal Problem: Septic shock    Patient ID: Alin Burkett is a 70 y.o. 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 ED workup showed pt was neutropenic with WBC 0.2 with fever. Pt was cultured and given fluid resuscitation and vancomycin and cefepime. Stool cultures positive for C. Difficile toxin. CT abd/pelvis without contrast revealed severe proctocolitis without megacolon. Pt remains pancytopenic with most recent WBC 0.11 with ANC 0.0 and H/H 9.6/29.7 and platelets 91. Pt seen at bedside with his wife and states he has chronic throbbing abdominal pain made worse by eating and has had chronic diarrhea. He states he is fatigued and has decreased appetite along with fever/chills and night sweats.    10/19/2020: Pt seen at bedside and appears cachectic. He endorses that he is still having diarrhea, but that it has improved and decreased in frequency and consistency has thickened. Pt states abdominal pain has improved, but it still present. He endorses fatigue. Pt denies hemoptysis, hematochezia, melena, hematuria.    10/20/2020: Pt seen at bedside in ICU. He states that he is feeling less lethargic. He states his diarrhea is improving and that he only has abdominal pain to palpation.    10/21/2020: Pt seen at bedside in ICU NAD. He states his abdominal pain has decreased. He is still having diarrhea but  with less frequency.     Review of Systems   Constitutional: Positive for activity change and fatigue. Negative for chills and fever.   HENT: Negative for mouth sores and trouble swallowing.    Eyes: Negative for photophobia and visual disturbance.   Respiratory: Negative for cough, chest tightness, shortness of breath, wheezing and stridor.    Cardiovascular: Negative for chest pain and leg swelling.   Gastrointestinal: Positive for abdominal distention, abdominal pain and diarrhea. Negative for constipation, nausea and vomiting.   Musculoskeletal: Negative for arthralgias, back pain and myalgias.   Skin: Negative for color change, pallor, rash and wound.   Neurological: Negative for syncope, speech difficulty and weakness.   Hematological: Negative for adenopathy. Does not bruise/bleed easily.   Psychiatric/Behavioral: Negative for agitation, behavioral problems, confusion, decreased concentration and dysphoric mood.        ONCOLOGY HISTORY:     1. Monomorphic post-transplant lymphoproliferative disorder              A. 2/2020: Noticed left inguinal lymphadenopathy after a dog bite (failed to resolve with antibiotics)              B. 6/2/2020: Saw Dr. Collins in infectious diseases for inguinal lymphadenopathy - referred for biopsy              C. 6/30/2020: Core biopsy of L inguinal lymph node shows B-cell lymphoma of germinal center origin; EBV-positive by LONNIE; morphology nondiagnostic of PTLD vs follicular lymphoma              D. 7/8/2020: PET/CT shows left internal iliac chain node measuring 3.3 x 3 cm with SUV max 37; L inguinal node measures 3.2 x 2.4 cm with SUV max 36              E. 7/13/2020: Excisional biopsy of left inguinal node shows monomorphic post-transplant lymphoproliferative disorder (DLBCL, GCB 60%, follicular lymphoma, grade 3B 40%); FISH for MYC rearrangement is negative              F. 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  Renal allograft biopsy 17 (DIVINE): 21 glomeruli, none globally sclerosed, <5% interstitial fibrosis, no ACR, c4d negative, AVR CCT Type 2 (V1 lesion); plan THYMO     Dissecting aortic aneurysm (any part), abdominal     Diverticulosis     Encounter for blood transfusion     ESRD (end stage renal disease) 2010    H/O urethral stricture 2018    H/O: urethral stricture     History of AAA (abdominal aortic aneurysm) repair     History of urethral stricture 2018    Hypertension     Hypokalemia     Hypothyroidism 1/10/2014    Inguinal hernia bilateral, non-recurrent     Kidney stones     Organ transplant candidate 2013    Plantar warts 1/10/2014    Recurrent UTI 2017    S/P kidney transplant     Secondary hyperparathyroidism, renal     Thyroid disease        Family History   Problem Relation Age of Onset    Diabetes Mother     Alzheimer's disease Mother     Alcohol abuse Father     HIV Brother     Stroke Maternal Aunt     Kidney disease Paternal Uncle     Kidney disease Cousin     No Known Problems Sister     No Known Problems Daughter     No Known Problems Sister     No Known Problems Brother     No Known Problems Brother     Cancer Brother         thyroid cancer    Colon cancer Brother     Melanoma Neg Hx     Psoriasis Neg Hx     Lupus Neg Hx     Eczema Neg Hx     Colon polyps Neg Hx      Crohn's disease Neg Hx     Ulcerative colitis Neg Hx     Celiac disease Neg Hx        Past Surgical History:   Procedure Laterality Date    ABDOMINAL SURGERY      exploratory lapatomy x 2    ABLATION N/A 8/22/2019    Procedure: ABLATION, SVT;  Surgeon: Emerson Mcmanus MD;  Location: [facility] EP LAB;  Service: Cardiology;  Laterality: N/A;  SVT, RFA, CARTO, anes, GP, 323    AORTA - SUPERIOR MESENTERIC ARTERY BYPASS GRAFT      BLADDER NECK RECONSTRUCTION      BLADDER SURGERY      COLONOSCOPY  10/10/2013    Dr. Gutierrez, repeat in 5 years    CYSTOSCOPY      CYSTOSCOPY N/A 12/19/2018    Procedure: CYSTOSCOPY;  Surgeon: Dewey Mann MD;  Location: [facility] OR [address] [place] [address];  Service: Urology;  Laterality: N/A;  45 min    CYSTOURETHROSCOPY WITH DIRECT VISION INTERNAL URETHROTOMY N/A 12/19/2018    Procedure: CYSTOSCOPY, WITH DIRECT VISION INTERNAL URETHROTOMY;  Surgeon: Dewey Mann MD;  Location: [address];  Service: Urology;  Laterality: N/A;    DILATION OF URETHRA N/A 12/19/2018    Procedure: DILATION, URETHRA;  Surgeon: Dewey Mann MD;  Location: [address];  Service: Urology;  Laterality: N/A;    EXCISIONAL BIOPSY N/A 7/13/2020    Procedure: EXCISIONAL BIOPSY- LEFT INGUINAL NODE;  Surgeon: Vlad Epstein MD;  Location: [address];  Service: General;  Laterality: N/A;    FLEXIBLE CYSTOSCOPY N/A 11/6/2019    Procedure: CYSTOSCOPY, FLEXIBLE;  Surgeon: Dewey Mann MD;  Location: [facility] OR [address] [place] [address];  Service: Urology;  Laterality: N/A;    GASTROJEJUNOSTOMY      HEMORRHOID SURGERY      HERNIA REPAIR      INSERTION OF VENOUS ACCESS PORT Left 7/27/2020    Procedure: INSERTION, VENOUS ACCESS PORT;  Surgeon: Vlad Epstein MD;  Location: [facility] OR [address] [place] [address];  Service: General;  Laterality: Left;    KIDNEY TRANSPLANT      LEFT HEART CATHETERIZATION Left 8/20/2019    Procedure: Left heart cath;  Surgeon: Javan Oscar MD;  Location: [facility] CATH/EP LAB;  Service: Cardiology;   Laterality: Left;    LITHOTRIPSY      LYMPH NODE BIOPSY N/A 6/30/2020    Procedure: BIOPSY, LYMPH NODE;  Surgeon: Ella Diagnostic Provider;  Location: [facility] OR [address] [place] [address];  Service: General;  Laterality: N/A;  189 lymph node biopsy /ULTRASOUND    PERCUTANEOUS NEPHROLITHOTRIPSY      right  ESWL  10/31/12    right ESWL  6/26/12    URETHROPLASTY USING PATCH GRAFT N/A 11/6/2019    Procedure: URETHROPLASTY, USING PATCH GRAFT BUCCAL MUCOSA GRAFT;  Surgeon: Dewey Mann MD;  Location: [facility] OR [address] [place] [address];  Service: Urology;  Laterality: N/A;  3 HOURS       Social History     Socioeconomic History    Marital status: Single     Spouse name: Not on file    Number of children: Not on file    Years of education: Not on file    Highest education level: Not on file   Occupational History     Employer: Disabled   Social Needs    Financial resource strain: Not on file    Food insecurity     Worry: Not on file     Inability: Not on file    Transportation needs     Medical: Not on file     Non-medical: Not on file   Tobacco Use    Smoking status: Former Smoker     Packs/day: 0.50     Years: 40.00     Pack years: 20.00     Types: Cigarettes     Quit date: 6/16/2010     Years since quitting: 10.3    Smokeless tobacco: Never Used   Substance and Sexual Activity    Alcohol use: Yes     Alcohol/week: 3.0 standard drinks     Types: 3 Cans of beer per week     Comment: occasional/social    Drug use: No     Comment: THC in youth    Sexual activity: Yes     Partners: Female     Birth control/protection: None   Lifestyle    Physical activity     Days per week: Not on file     Minutes per session: Not on file    Stress: Not on file   Relationships    Social connections     Talks on phone: Not on file     Gets together: Not on file     Attends Orthodoxy service: Not on file     Active member of club or organization: Not on file     Attends meetings of clubs or organizations: Not on file     Relationship status: Not on file   Other  Topics Concern    Not on file   Social History Narrative    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       Current Facility-Administered Medications   Medication Dose Route Frequency Provider Last Rate Last Dose    acetaminophen tablet 650 mg  650 mg Oral Q4H PRN Brennan Decker MD   650 mg at 10/16/20 1259    albuterol-ipratropium 2.5 mg-0.5 mg/3 mL nebulizer solution 3 mL  3 mL Nebulization Q6H PRN Michelle Jiménez MD   3 mL at 10/20/20 1602    allopurinoL tablet 300 mg  300 mg Oral Daily Brennan Decker MD   300 mg at 10/21/20 0847    aspirin EC tablet 81 mg  81 mg Oral Daily Brennan Decker MD   81 mg at 10/21/20 0846    brimonidine-timoloL 0.2-0.5 % ophthalmic solution 1 drop  1 drop Both Eyes Q12H Brennan Decker MD   1 drop at 10/20/20 2330    calcitRIOL capsule 0.5 mcg  0.5 mcg Oral Daily Brennan Decker MD   0.5 mcg at 10/21/20 0846    cholestyramine-aspartame 4 gram packet 4 g  1 packet Oral BID Michelle Jiménez MD   4 g at 10/20/20 2350    dicyclomine capsule 10 mg  10 mg Oral QID Lola Tolbert MD   10 mg at 10/21/20 0847    dronabinoL capsule 2.5 mg  2.5 mg Oral BID Julia Summers MD   2.5 mg at 10/21/20 0847    famotidine tablet 20 mg  20 mg Oral QHS Brennan Decker MD   20 mg at 10/20/20 2148    ferrous sulfate EC tablet 325 mg  325 mg Oral BID Alexi Camp PA-C   325 mg at 10/21/20 0847    hyoscyamine ODT 0.125 mg  0.125 mg Sublingual Q4H PRN Lola Tolbert MD   0.125 mg at 10/17/20 1604    Lactobacillus acidoph-L.bulgar 1 million cell tablet 4 tablet  4 tablet Oral TID  Michelle Jiménez MD   4 tablet at 10/21/20 0845    levothyroxine tablet 100 mcg  100 mcg Oral Before breakfast Brennan Decker MD   100 mcg at 10/21/20 0608    magnesium sulfate 2g in water 50mL IVPB (premix)  2 g Intravenous PRN Brennan Decker MD        magnesium sulfate 2g in water 50mL IVPB (premix)  4 g Intravenous PRN Brennan Decker MD        metoclopramide HCl injection  10 mg  10 mg Intravenous Q6H PRN Brennan Decker MD        metronidazole IVPB 500 mg  500 mg Intravenous Q8H Lola Tolbert  mL/hr at 10/21/20 0352 500 mg at 10/21/20 0352    morphine injection 2 mg  2 mg Intravenous Q4H PRN Julia Summers MD   2 mg at 10/20/20 2213    NORepinephrine bitartrate 8 mg in dextrose 5% 250 mL infusion  0.02 mcg/kg/min Intravenous Continuous Brennan Decker MD 6.4 mL/hr at 10/21/20 0745 0.05 mcg/kg/min at 10/21/20 0745    ondansetron disintegrating tablet 8 mg  8 mg Oral Q6H PRN Brennan Decker MD        potassium chloride 10 mEq in 100 mL IVPB  40 mEq Intravenous PRN Brennan Decker  mL/hr at 10/20/20 0729 40 mEq at 10/20/20 0729    And    potassium chloride 10 mEq in 100 mL IVPB  60 mEq Intravenous PRN Brennan Decker MD        And    potassium chloride 10 mEq in 100 mL IVPB  80 mEq Intravenous PRN Brennan Decker MD        promethazine tablet 25 mg  25 mg Oral Q6H PRN Brennan Decker MD        sodium chloride 0.45% 1,000 mL with sodium bicarbonate 100 mEq infusion   Intravenous Continuous Tc [place]  mL/hr at 10/21/20 0134      sodium chloride 0.9% flush 10 mL  10 mL Intravenous PRN Brennan Decker MD        tacrolimus capsule 2 mg  2 mg Oral Daily PM Brennan Decker MD   2 mg at 10/20/20 1725    tacrolimus capsule 3 mg  3 mg Oral Daily AM Brennan Decker MD   3 mg at 10/21/20 0847    travoprost 0.004 % ophthalmic solution 1 drop  1 drop Both Eyes QHS Brennan Decker MD   1 drop at 10/20/20 2200    vancomycin 25 mg/mL oral solution 500 mg  500 mg Oral Q6H Lola Tolbert MD   500 mg at 10/21/20 0608        allopurinoL  300 mg Oral Daily    aspirin  81 mg Oral Daily    brimonidine-timoloL  1 drop Both Eyes Q12H    calcitRIOL  0.5 mcg Oral Daily    cholestyramine-aspartame  1 packet Oral BID    dicyclomine  10 mg Oral QID    dronabinoL  2.5 mg Oral BID    famotidine  20 mg Oral QHS    ferrous sulfate  325 mg  Oral BID    Lactobacillus acidoph-L.bulgar  4 tablet Oral TID WM    levothyroxine  100 mcg Oral Before breakfast    metronidazole  500 mg Intravenous Q8H    tacrolimus  2 mg Oral Daily PM    tacrolimus  3 mg Oral Daily AM    travoprost  1 drop Both Eyes QHS    vancomycin  500 mg Oral Q6H        norepinephrine bitartrate-D5W 0.05 mcg/kg/min (10/21/20 0745)    custom IV infusion builder 100 mL/hr at 10/21/20 0134       acetaminophen, albuterol-ipratropium, hyoscyamine, magnesium sulfate IVPB, magnesium sulfate IVPB, metoclopramide HCl, morphine, ondansetron, potassium chloride in water **AND** potassium chloride in water **AND** potassium chloride in water, promethazine, sodium chloride 0.9%    Antibiotics (From admission, onward)    Start     Stop Route Frequency Ordered    10/17/20 0000  vancomycin 25 mg/mL oral solution 500 mg  (C. difficile Infection (CDI) Treatment Order Panel)      10/26 0559 Oral Every 6 hours 10/16/20 1755    10/16/20 1900  metronidazole IVPB 500 mg      -- IV Every 8 hours (non-standard times) 10/16/20 1755          Review of patient's allergies indicates:  No Known Allergies  All medications and past history have been reviewed.    Objective:      Vitals:  Patient Vitals for the past 24 hrs:   BP Temp Temp src Pulse Resp SpO2 Height Weight   10/21/20 0800 100/74 -- -- 98 15 100 % -- --   10/21/20 0745 95/68 98.4 °F (36.9 °C) Oral 99 18 100 % -- --   10/21/20 0730 102/67 -- -- 98 17 98 % -- --   10/21/20 0715 102/70 -- -- 99 16 98 % -- --   10/21/20 0700 102/63 -- -- 97 16 96 % -- 78.3 kg (172 lb 9.9 oz)   10/21/20 0645 (!) 102/58 -- -- 98 16 97 % -- --   10/21/20 0630 (!) 91/57 -- -- 98 (!) 30 (!) 94 % -- --   10/21/20 0615 (!) 88/58 -- -- 96 (!) 28 99 % -- --   10/21/20 0600 (!) 87/58 -- -- 99 19 (!) 93 % -- --   10/21/20 0545 95/64 -- -- 95 20 97 % -- --   10/21/20 0530 110/78 -- -- 100 (!) 27 (!) 85 % -- --   10/21/20 0515 105/67 -- -- 98 16 98 % -- --   10/21/20 0500 108/70 -- --  100 19 97 % -- --   10/21/20 0445 110/70 -- -- 99 19 97 % -- --   10/21/20 0430 113/70 -- -- 98 20 99 % -- --   10/21/20 0415 108/69 -- -- 97 18 99 % -- --   10/21/20 0400 102/72 -- -- 96 17 99 % -- --   10/21/20 0345 110/77 -- -- 101 18 98 % -- --   10/21/20 0330 111/77 -- -- 94 18 99 % -- --   10/21/20 0315 112/77 98.6 °F (37 °C) Temporal 96 17 99 % -- --   10/21/20 0300 117/77 -- -- 91 19 99 % -- --   10/21/20 0245 117/79 -- -- 95 19 99 % -- --   10/21/20 0230 110/82 -- -- 96 19 100 % -- --   10/21/20 0215 111/83 -- -- 89 18 99 % -- --   10/21/20 0200 112/83 -- -- 95 18 99 % -- --   10/21/20 0145 111/81 -- -- 93 18 100 % -- --   10/21/20 0130 115/79 -- -- 92 19 97 % -- --   10/21/20 0115 (!) 110/92 -- -- 92 15 98 % -- --   10/21/20 0100 104/77 -- -- 98 17 97 % -- --   10/21/20 0045 114/79 -- -- 93 16 98 % -- --   10/21/20 0030 122/83 98.4 °F (36.9 °C) Temporal 92 17 98 % -- --   10/21/20 0015 118/81 -- -- 93 19 96 % -- --   10/21/20 0000 126/80 -- -- 98 (!) 24 98 % -- --   10/20/20 2345 132/80 -- -- 95 18 98 % -- --   10/20/20 2330 128/78 -- -- 97 19 99 % -- --   10/20/20 2315 128/80 -- -- 96 19 100 % -- --   10/20/20 2300 115/76 -- -- 93 (!) 21 99 % -- --   10/20/20 2245 124/81 -- -- 94 19 99 % -- --   10/20/20 2230 138/87 -- -- 93 (!) 22 98 % -- --   10/20/20 2215 (!) 152/103 -- -- 93 (!) 22 97 % -- --   10/20/20 2213 -- -- -- -- 16 -- -- --   10/20/20 2200 125/78 -- -- 93 (!) 24 99 % -- --   10/20/20 2145 -- -- -- 101 (!) 37 (!) 92 % -- --   10/20/20 2130 131/84 -- -- 102 (!) 25 98 % -- --   10/20/20 2115 124/77 -- -- 94 16 100 % -- --   10/20/20 2100 (!) 133/94 -- -- 95 17 100 % -- --   10/20/20 2045 127/77 -- -- 98 (!) 26 99 % -- --   10/20/20 2030 131/79 -- -- 97 16 100 % -- --   10/20/20 2015 135/83 -- -- 99 17 99 % -- --   10/20/20 2000 137/76 -- -- 97 17 99 % -- --   10/20/20 1945 (!) 149/72 98.2 °F (36.8 °C) Temporal 97 16 98 % -- --   10/20/20 1930 (!) 140/75 -- -- 97 18 97 % -- --   10/20/20 1915 (!)  "140/67 -- -- 94 18 97 % -- --   10/20/20 1830 134/76 -- -- 91 19 98 % -- --   10/20/20 1815 117/76 -- -- 90 20 97 % -- --   10/20/20 1800 119/86 -- -- 86 18 97 % -- --   10/20/20 1745 -- -- -- 95 (!) 24 (!) 93 % -- --   10/20/20 1730 106/83 -- -- 85 18 96 % -- --   10/20/20 1715 112/79 -- -- 88 (!) 26 (!) 93 % -- --   10/20/20 1700 102/70 -- -- 89 17 98 % -- --   10/20/20 1645 108/64 -- -- 90 16 97 % -- --   10/20/20 1630 -- -- -- 92 17 98 % -- --   10/20/20 1615 100/69 -- -- 86 18 99 % -- --   10/20/20 1602 -- -- -- 84 20 98 % -- --   10/20/20 1600 104/78 -- -- 83 17 97 % -- --   10/20/20 1545 105/69 -- -- 89 (!) 22 100 % -- --   10/20/20 1530 109/71 97.8 °F (36.6 °C) Oral 81 (!) 24 98 % -- --   10/20/20 1515 109/61 -- -- 83 16 98 % -- --   10/20/20 1500 99/67 -- -- 88 15 99 % -- --   10/20/20 1445 96/68 -- -- 83 16 100 % -- --   10/20/20 1430 102/68 -- -- 83 16 98 % -- --   10/20/20 1415 -- -- -- 82 (!) 34 97 % -- --   10/20/20 1400 102/74 -- -- 81 (!) 28 99 % -- --   10/20/20 1345 -- -- -- 80 (!) 28 96 % -- --   10/20/20 1330 113/64 -- -- 82 16 100 % -- --   10/20/20 1315 104/65 -- -- 83 16 100 % -- --   10/20/20 1302 -- -- -- -- -- -- 5' 11" (1.803 m) --   10/20/20 1300 (!) 115/57 -- -- 84 16 100 % -- --   10/20/20 1256 -- -- -- -- -- -- 5' 11" (1.803 m) 68.1 kg (150 lb 2.1 oz)   10/20/20 1245 111/66 -- -- 82 17 100 % -- --   10/20/20 1230 (!) 100/58 -- -- 83 (!) 32 99 % -- --   10/20/20 1215 96/61 -- -- 83 18 100 % -- --   10/20/20 1200 100/67 -- -- 86 (!) 28 99 % -- --   10/20/20 1145 105/63 97.3 °F (36.3 °C) Oral 83 (!) 21 100 % -- --   10/20/20 1130 (!) 107/59 -- -- 85 16 100 % -- --   10/20/20 1115 107/62 -- -- 86 13 99 % -- --   10/20/20 1100 102/67 -- -- 85 13 98 % -- --   10/20/20 1045 96/64 -- -- 87 15 98 % -- --   10/20/20 1030 105/69 -- -- 88 16 99 % -- --   10/20/20 1015 114/75 -- -- 99 (!) 21 100 % -- --   10/20/20 1000 116/83 -- -- 97 (!) 22 100 % -- --   10/20/20 0945 114/76 -- -- 96 13 99 % -- -- "   10/20/20 0930 109/74 -- -- 96 15 97 % -- --   10/20/20 0915 111/62 -- -- 101 15 99 % -- --      Body mass index is 24.08 kg/m².  Body surface area is 1.98 meters squared.    Last 24 Hours:    Intake/Output Summary (Last 24 hours) at 10/21/2020 0901  Last data filed at 10/21/2020 0700  Gross per 24 hour   Intake 3622.08 ml   Output 1325 ml   Net 2297.08 ml     Weight Readings:  Wt Readings from Last 5 Encounters:   10/21/20 78.3 kg (172 lb 9.9 oz)   10/10/20 68.5 kg (151 lb)   10/09/20 66.4 kg (146 lb 6.2 oz)   10/02/20 65.8 kg (145 lb)   09/11/20 68.6 kg (151 lb 2 oz)      Blood Type:  B POS     Physical Exam  Constitutional:       Appearance: He is ill-appearing.   HENT:      Head: Normocephalic and atraumatic.      Right Ear: External ear normal.      Left Ear: External ear normal.      Nose: Nose normal. No congestion or rhinorrhea.   Eyes:      General:         Right eye: No discharge.         Left eye: No discharge.      Extraocular Movements: Extraocular movements intact.      Conjunctiva/sclera: Conjunctivae normal.      Pupils: Pupils are equal, round, and reactive to light.   Neck:      Musculoskeletal: Normal range of motion and neck supple. No neck rigidity.   Cardiovascular:      Rate and Rhythm: Normal rate and regular rhythm.      Heart sounds: Normal heart sounds. No murmur.   Pulmonary:      Effort: Pulmonary effort is normal. No respiratory distress.      Breath sounds: Normal breath sounds. No stridor. No wheezing, rhonchi or rales.   Abdominal:      General: Bowel sounds are normal. There is distension.      Palpations: Abdomen is soft.      Tenderness: There is abdominal tenderness (LLQ). There is no guarding.   Musculoskeletal: Normal range of motion.         General: No deformity.      Right lower leg: No edema.      Left lower leg: No edema.   Lymphadenopathy:      Cervical: No cervical adenopathy.   Skin:     General: Skin is warm and dry.      Coloration: Skin is not pale.      Findings:  Bruising (bialteral UE and chest) present. No erythema or rash.   Neurological:      General: No focal deficit present.      Mental Status: He is alert and oriented to person, place, and time. Mental status is at baseline.      Cranial Nerves: No cranial nerve deficit.   Psychiatric:         Mood and Affect: Mood normal.         Behavior: Behavior normal.         Thought Content: Thought content normal.         Judgment: Judgment normal.         Labs:  Recent Labs   Lab 10/19/20  0316 10/20/20  0521 10/21/20  0354   WBC 5.46 12.82* 14.81*   RBC 3.22* 2.92* 3.15*   HGB 10.3* 9.3* 10.1*   HCT 30.4* 28.1* 30.0*   PLT 43* 46* 76*   MCV 94 96 95     Recent Labs   Lab 10/15/20  1520  10/20/20  0337 10/20/20  1515 10/20/20  2352 10/21/20  0354      < > 128*  --  132* 132*   K 2.6*   < > 3.6 4.4 4.3 4.1      < > 111*  --  112* 111*   CO2 17*   < > 9*  --  12* 11*   BUN 20   < > 30*  --  37* 35*   CREATININE 1.4   < > 2.0*  --  2.5* 2.6*      < > 207*  --  92 92   CALCIUM 7.9*   < > 6.9*  --  7.9* 7.9*   MG 2.3  --   --   --   --   --    PHOS 2.0*  --   --   --   --  2.8   ALKPHOS 65  --   --   --  154*  --    PROT 5.2*  --   --   --  4.2*  --    ALBUMIN 2.4*  --   --   --  1.5* 1.4*   BILITOT 0.7  --   --   --  0.5  --    AST 12  --   --   --  21  --    ALT 14  --   --   --  18  --     < > = values in this interval not displayed.       Imaging:  Results for orders placed or performed during the hospital encounter of 10/15/20 (from the past 2160 hour(s))   CT Abdomen Pelvis  Without Contrast    Impression    Findings consistent with severe proctocolitis.    Transplanted kidney right side of the pelvis with mild pelvocaliectasis and perinephric stranding nonspecific.  No calcified stones seen    Additional findings as detailed above including cystic lesion with in the native right kidney versus dilatation of collecting structure of the native right kidney.  Stones in the native right kidney.  Cystic lesion  within the pancreas.    Final read    Virtual Radiology concordant      Electronically signed by: Ada Castillo MD  Date:    10/16/2020  Time:    08:56   Results for orders placed or performed during the hospital encounter of 08/06/20 (from the past 2160 hour(s))   CT Chest Abdomen Pelvis Without Contrast (XPD)    Impression    No acute abdominal pathology identified.    Nonvisualization of the left kidney with malrotation of the right kidney and multiple nonobstructing renal stones.  No hydronephrosis.    Right pelvic renal allograft with no evidence for renal allograft focal lesion, nephrolithiasis, or hydronephrosis.    Stable mild ectasia of the infrarenal abdominal aorta measuring up to 2.6 cm without evidence for aortic aneurysm or other acute aortic pathology.  Atherosclerosis identified.    Multiple enlarged lymph nodes in the left inguinal region, with interval dissection of multiple left inguinal and pelvic lymph nodes when compared to nuclear medicine PET-CT 07/08/2020.  Correlate with biopsy results.    Stable cystic appearing lesion in the pancreatic head measuring 1.2 cm.    Other findings as above.    Electronically signed by resident: Mati Cárdenas  Date:    08/06/2020  Time:    09:23    Electronically signed by: Jayson Staples MD  Date:    08/06/2020  Time:    10:34     *Note: Due to a large number of results and/or encounters for the requested time period, some results have not been displayed. A complete set of results can be found in Results Review.     PET CT 07/08/2020  FINDINGS:  Quality of the study: Adequate.     In the neck, there is no abnormal hypermetabolic activity worrisome for malignancy.  Vascular stent identified in the left axillary region.  No significant lymphadenopathy.     In the chest, there is no abnormal hypermetabolic activity worsened malignancy.  Coronary atherosclerosis.  0.4 cm pulmonary nodule identified in the left upper lobe (axial series 3, image 67), lesion is too  small to characterize with PET-CT. Recommend attenuation expected follow-up examinations. No significant lymphadenopathy.     In the abdomen and pelvis, there is hypermetabolic lymphadenopathy throughout the left internal/external iliac chain and left groin.  New left internal iliac chain node measures 3.3 x 3 0 cm with SUV max of 37 (axial fused image 193).  Left inguinal node measures approximately 3.2 x 2.4 cm with SUV max of 36 (axial fused image 218), previously measured up to 1.4 cm.  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  The right kidney appears atrophic with abnormal contour parenchymal calcifications, unchanged.  Right lower quadrant transplant kidney in place.  Stable small bladder diverticulum.  Stable 1.2 cm pancreatic cyst, better characterized on most recent CT with IV contrast.  Stable bilateral probable adrenal adenomas.  Colonic diverticulosis without evidence of acute diverticulitis.  Stable fusiform abdominal aortic ectasia.     Spleen appears upper limit of normal for size measuring 12.0 cm in craniocaudal dimension.  Normal heterogeneous uptake similar to liver.     In the bones, there is no abnormal activity worrisome for malignancy.  Additional focus of increased uptake identified within the myofascial structures of the thigh, just deep to the left femur with SUV max of 27 (axial fused image 269).     Impression:     Hypermetabolic lymphadenopathy throughout the left iliac chain and left groin with additional hypermetabolic focus in the left thigh.  No hypermetabolic juan david disease above the diaphragm.  The Deauville score is 5.    All lab results and imaging results have been reviewed.    Assessment and Plan:      Present on Admission:   Febrile neutropenia   Septic shock   Acquired hypothyroidism   Post-transplant lymphoproliferative disorder (PTLD)   Stage 3 chronic kidney disease   Hypokalemia due to excessive gastrointestinal loss of potassium   Anemia due to chemotherapy   Anemia  in stage 3 chronic kidney disease   Moderate malnutrition       Post-transplant lymphoproliferative disorder (PTLD)  -Cycle 4 RCHOP completed on 10/09/2020.  -Please have patient follow up with Primary Oncologist Dr. Luis Fernando Lopez within 72 hours of discharge.   -Continue Tacrolimus     C-difficile colitis  -Follow ID recommendations.       Normocytic Anemia with Thrombocytopenia  -Secondary to chemotherapy and disease.  -Neutropenia resolved. Pt given Granix 300mcg daily x 3 doses and ANC WNL with last granix dose administered 10/18/2020.   -Most recent H/H shows improvement to 10.1/30.0. Pt serum iron <10 and ferritin elevated. Pt in septic shock with decreased renal function. Ferritin is an acute phase reactant that can be elevated due to inflammation. Continue ferous sulfate BID for iron supplementation. We will hold on IV iron for now to avoid overloading the patient.   -Platelets have improved to 76. Continue to monitor. Transfuse platelets PRN if platelet count <20,000 or if pt actively bleeding.  -Continue abx regimen for C. Difficile.    Sincerely,  Alexi Camp PA-C    Note is available for collaborating MD; Dr. Julienne Alfaro for review.    Electronically signed by: Alexi Camp PA-C

## 2020-10-21 NOTE — ASSESSMENT & PLAN NOTE
Patient's anemia is currently controlled. Has not received any PRBCs to date.. Etiology likely d/t chemo  Current CBC reviewed-   Lab Results   Component Value Date    HGB 10.1 (L) 10/21/2020    HCT 30.0 (L) 10/21/2020     Monitor serial CBC and transfuse if patient becomes hemodynamically unstable, symptomatic or H/H drops below 7/21.

## 2020-10-21 NOTE — ASSESSMENT & PLAN NOTE
Creatinine trending up   BMP reviewed- noted Estimated Creatinine Clearance: 28.6 mL/min (A) (based on SCr of 2.6 mg/dL (H)). according to latest data.   Monitor UOP and serial BMP and adjust therapy as needed. Renally dose meds.

## 2020-10-21 NOTE — ASSESSMENT & PLAN NOTE
Nutrition consulted. Body mass index is 24.08 kg/m².. Encourage maximal PO intake. Diet supplementation ordered per nutrition approval. Will encourage PO and monitor closely for weight changes.

## 2020-10-21 NOTE — ASSESSMENT & PLAN NOTE
Will treat empirically with  Vancomycin.  Improved  Monitor leukocyte count.  Hematology on the case  Granix given today.;    WBC   Date Value Ref Range Status   10/21/2020 14.81 (H) 3.90 - 12.70 K/uL Final

## 2020-10-21 NOTE — ASSESSMENT & PLAN NOTE
Patient's anemia is currently controlled. Has not received any PRBCs to date.. Etiology likely d/t  anemia of chronic disease  Current CBC reviewed-   Lab Results   Component Value Date    HGB 10.1 (L) 10/21/2020    HCT 30.0 (L) 10/21/2020     Monitor serial CBC and transfuse if patient becomes hemodynamically unstable, symptomatic or H/H drops below 7/21.

## 2020-10-21 NOTE — PROGRESS NOTES
Per discussion with MD and RN pt still not eating despite supplements. RN will try to feed pt today. If PO intakes remain < 50% of meals and supplements rec. Nutrition support, ideally via NGT as gut functioning and pt on low dose pressor.    1) Nutren 1.5 @ 20 ml/hr advancing to goal of 50 ml/hr + 180 ml flush q 4 hr  ( provides 81 g protein ( 100% EEN), 1800 kcal ( 94% EEN), and 912 ml free water)    2) If NGT unable to be placed, PPN appropriate  D 5 AA 4.25 @ 85 ml/hr + standard lipids  ( 1192 kcal ( 62% EEN), 86 g protein ( 100% EPN))

## 2020-10-21 NOTE — SUBJECTIVE & OBJECTIVE
Interval History:  Continues to have loose stools.  Mental status improved.  Patient's bicarb trending down appears to have non anion metabolic acidosis.     Review of Systems   Constitutional: Positive for chills and fatigue. Negative for appetite change and fever.   HENT: Negative for congestion, hearing loss, rhinorrhea, sore throat, trouble swallowing and voice change.    Respiratory: Negative for cough, chest tightness, shortness of breath and wheezing.    Cardiovascular: Negative for chest pain, palpitations and leg swelling.   Gastrointestinal: Positive for abdominal pain and diarrhea. Negative for blood in stool, nausea and vomiting.   Genitourinary: Negative for difficulty urinating, frequency, hematuria and urgency.   Musculoskeletal: Negative for back pain, joint swelling and neck stiffness.   Skin: Negative for pallor and rash.   Neurological: Negative for tremors, seizures, syncope, speech difficulty, weakness, numbness and headaches.   Hematological: Negative for adenopathy.   Psychiatric/Behavioral: Negative for agitation, behavioral problems, confusion and sleep disturbance.     Objective:     Vital Signs (Most Recent):  Temp: 98.4 °F (36.9 °C) (10/21/20 0745)  Pulse: 98 (10/21/20 1000)  Resp: 16 (10/21/20 1000)  BP: 102/72 (10/21/20 1000)  SpO2: 99 % (10/21/20 1000) Vital Signs (24h Range):  Temp:  [97.3 °F (36.3 °C)-98.6 °F (37 °C)] 98.4 °F (36.9 °C)  Pulse:  [] 98  Resp:  [13-37] 16  SpO2:  [85 %-100 %] 99 %  BP: ()/() 102/72     Weight: 78.3 kg (172 lb 9.9 oz)  Body mass index is 24.08 kg/m².    Intake/Output Summary (Last 24 hours) at 10/21/2020 1100  Last data filed at 10/21/2020 0700  Gross per 24 hour   Intake 3622.08 ml   Output 1100 ml   Net 2522.08 ml      Physical Exam  Vitals signs and nursing note reviewed.   Constitutional:       General: He is not in acute distress.     Appearance: He is ill-appearing. He is not diaphoretic.   HENT:      Head: Normocephalic and  atraumatic.      Mouth/Throat:      Mouth: Mucous membranes are dry.   Eyes:      General: No scleral icterus.        Right eye: No discharge.         Left eye: No discharge.      Pupils: Pupils are equal, round, and reactive to light.   Neck:      Vascular: No JVD.   Cardiovascular:      Rate and Rhythm: Normal rate and regular rhythm.   Pulmonary:      Effort: Pulmonary effort is normal.      Breath sounds: Normal breath sounds.   Abdominal:      General: Bowel sounds are normal. There is no distension.      Palpations: Abdomen is soft.      Tenderness: There is abdominal tenderness in the left upper quadrant and left lower quadrant. There is no rebound.   Musculoskeletal:      Right lower leg: No edema.      Left lower leg: No edema.   Skin:     General: Skin is warm.      Capillary Refill: Capillary refill takes less than 2 seconds.      Findings: No rash.   Neurological:      General: No focal deficit present.      Mental Status: He is alert.      Cranial Nerves: No cranial nerve deficit.   Psychiatric:         Mood and Affect: Mood normal.         Significant Labs:   BMP:   Recent Labs   Lab 10/21/20  0354   GLU 92   *   K 4.1   *   CO2 11*   BUN 35*   CREATININE 2.6*   CALCIUM 7.9*     CBC:   Recent Labs   Lab 10/20/20  0521 10/21/20  0354   WBC 12.82* 14.81*   HGB 9.3* 10.1*   HCT 28.1* 30.0*   PLT 46* 76*       Significant Imaging: I have reviewed all pertinent imaging results/findings within the past 24 hours.

## 2020-10-22 NOTE — PLAN OF CARE
Pt remained free of falls or injuries this shift.  Pt remained calm and was able to rest well.  Levo gtt continued at same rate, tolerated well.  Pt given bath and sheets changed.  Water and drink encouraged, no interest in eating however.  Pt complained of no pain and seemed to be in much better spirits, will continue to monitor.

## 2020-10-22 NOTE — ASSESSMENT & PLAN NOTE
Source is likely colitis from C.difficile.  Map appears to be above 70.  Will titrate down the Levophed  Empiric antibiotcs: vancomycin and cefepime.  IV and oral vancomycin.  Continue norepinephrine drip.  Lactate level is ok.  Microbiology Results (last 7 days)     Procedure Component Value Units Date/Time    Urine Culture High Risk [[id number]] Collected: 10/20/20 0022    Order Status: Completed Specimen: Urine, Clean Catch Updated: 10/21/20 1928     Urine Culture, Routine No growth    Narrative:      Indicated criteria for high risk culture:->Other  Other (specify):->Neutropenic patient    Blood culture x two cultures. Draw prior to antibiotics. [[id number]] Collected: 10/15/20 1626    Order Status: Completed Specimen: Blood from Peripheral, Right Hand Updated: 10/21/20 0612     Blood Culture, Routine No growth after 5 days.    Narrative:      Aerobic and anaerobic    Blood culture x two cultures. Draw prior to antibiotics. [[id number]] Collected: 10/15/20 1546    Order Status: Completed Specimen: Blood from Antecubital, Right Arm Updated: 10/21/20 0612     Blood Culture, Routine No growth after 5 days.    Narrative:      Aerobic and anaerobic    Clostridium difficile EIA [[id number]]  (Abnormal) Collected: 10/16/20 0358    Order Status: Completed Specimen: Stool Updated: 10/16/20 1057     C. diff Antigen Positive     C difficile Toxins A+B, EIA Positive     Comment: Testing not recommended for children <24 months old.

## 2020-10-22 NOTE — ASSESSMENT & PLAN NOTE
Resolved Give supplement and monitor level.    Potassium   Date Value Ref Range Status   10/22/2020 4.2 3.5 - 5.1 mmol/L Final   10/21/2020 4.1 3.5 - 5.1 mmol/L Final

## 2020-10-22 NOTE — PT/OT/SLP PROGRESS
Occupational Therapy      Patient Name:  Alin Burkett   MRN:  [id number]    Attempted OT evaluation this afternoon. Patient eating lunch upon arrival. Patient's nurse advised that patient be allowed to finish his lunch prior to therapy. Will follow up later today or tomorrow morning.    Dez Kay, OT  10/22/2020

## 2020-10-22 NOTE — SUBJECTIVE & OBJECTIVE
Interval History:  Continues to have loose stools.  Creatinine trending up.  Continues to be acidotic currently on bicarb.  Afebrile blood pressure stable    Review of Systems   Constitutional: Positive for fatigue. Negative for appetite change, chills and fever.   HENT: Negative for congestion, hearing loss, rhinorrhea, sore throat, trouble swallowing and voice change.    Respiratory: Negative for cough, chest tightness, shortness of breath and wheezing.    Cardiovascular: Negative for chest pain, palpitations and leg swelling.   Gastrointestinal: Positive for abdominal pain and diarrhea. Negative for blood in stool, nausea and vomiting.   Genitourinary: Negative for difficulty urinating, frequency, hematuria and urgency.   Musculoskeletal: Negative for back pain, joint swelling and neck stiffness.   Skin: Negative for pallor and rash.   Neurological: Negative for tremors, seizures, syncope, speech difficulty, weakness, numbness and headaches.   Hematological: Negative for adenopathy.   Psychiatric/Behavioral: Negative for agitation, behavioral problems, confusion and sleep disturbance.     Objective:     Vital Signs (Most Recent):  Temp: 97.8 °F (36.6 °C) (10/22/20 1200)  Pulse: 97 (10/22/20 1520)  Resp: (!) 28 (10/22/20 1520)  BP: 104/69 (10/22/20 1520)  SpO2: 96 % (10/22/20 1520) Vital Signs (24h Range):  Temp:  [97.8 °F (36.6 °C)-99 °F (37.2 °C)] 97.8 °F (36.6 °C)  Pulse:  [] 97  Resp:  [15-37] 28  SpO2:  [71 %-100 %] 96 %  BP: ()/(63-91) 104/69     Weight: 78.7 kg (173 lb 8 oz)  Body mass index is 24.2 kg/m².    Intake/Output Summary (Last 24 hours) at 10/22/2020 1531  Last data filed at 10/22/2020 1500  Gross per 24 hour   Intake 3886.39 ml   Output 1125 ml   Net 2761.39 ml      Physical Exam  Vitals signs and nursing note reviewed.   Constitutional:       General: He is not in acute distress.     Appearance: He is ill-appearing. He is not diaphoretic.   HENT:      Head: Normocephalic and  atraumatic.      Mouth/Throat:      Mouth: Mucous membranes are dry.   Eyes:      General: No scleral icterus.        Right eye: No discharge.         Left eye: No discharge.      Pupils: Pupils are equal, round, and reactive to light.   Neck:      Vascular: No JVD.   Cardiovascular:      Rate and Rhythm: Normal rate and regular rhythm.   Pulmonary:      Effort: Pulmonary effort is normal.      Breath sounds: Normal breath sounds.   Abdominal:      General: Bowel sounds are normal. There is no distension.      Palpations: Abdomen is soft.      Tenderness: There is abdominal tenderness in the left upper quadrant and left lower quadrant. There is no rebound.   Musculoskeletal:      Right lower leg: No edema.      Left lower leg: No edema.   Skin:     General: Skin is warm.      Capillary Refill: Capillary refill takes less than 2 seconds.      Findings: No rash.   Neurological:      General: No focal deficit present.      Mental Status: He is alert.      Cranial Nerves: No cranial nerve deficit.   Psychiatric:         Mood and Affect: Mood normal.         Significant Labs:   BMP:   Recent Labs   Lab 10/22/20  0411      *   K 4.2   *   CO2 18*   BUN 34*   CREATININE 2.8*   CALCIUM 7.8*     CBC:   Recent Labs   Lab 10/21/20  0354 10/22/20  0411   WBC 14.81* 12.13   HGB 10.1* 10.3*   HCT 30.0* 30.5*   PLT 76* 115*       Significant Imaging: I have reviewed all pertinent imaging results/findings within the past 24 hours.

## 2020-10-22 NOTE — ASSESSMENT & PLAN NOTE
Patient's anemia is currently controlled. Has not received any PRBCs to date.. Etiology likely d/t chemo  Current CBC reviewed-   Lab Results   Component Value Date    HGB 10.3 (L) 10/22/2020    HCT 30.5 (L) 10/22/2020     Monitor serial CBC and transfuse if patient becomes hemodynamically unstable, symptomatic or H/H drops below 7/21.

## 2020-10-22 NOTE — ASSESSMENT & PLAN NOTE
Will treat empirically with  Vancomycin.  Improved  Monitor leukocyte count.  Hematology on the case  Granix given today.;    WBC   Date Value Ref Range Status   10/22/2020 12.13 3.90 - 12.70 K/uL Final

## 2020-10-22 NOTE — ASSESSMENT & PLAN NOTE
Nutrition consulted. Body mass index is 24.2 kg/m².. Encourage maximal PO intake. Diet supplementation ordered per nutrition approval. Will encourage PO and monitor closely for weight changes.

## 2020-10-22 NOTE — PLAN OF CARE
10/21/20 1906   Patient Assessment/Suction   Level of Consciousness (AVPU) alert   Respiratory Effort Normal;Unlabored   Expansion/Accessory Muscles/Retractions no retractions;no use of accessory muscles   All Lung Fields Breath Sounds coarse;Anterior:;Lateral:   Rhythm/Pattern, Respiratory depth regular;unlabored;pattern regular   Cough Frequency no cough   PRE-TX-O2   O2 Device (Oxygen Therapy) room air   SpO2 99 %   Pulse Oximetry Type Continuous   $ Pulse Oximetry - Multiple Charge Pulse Oximetry - Multiple   Pulse 105   Resp (!) 22   /76   Aerosol Therapy   $ Aerosol Therapy Charges PRN treatment not required   Respiratory Treatment Status (SVN) PRN treatment not required

## 2020-10-22 NOTE — PROGRESS NOTES
"Progress Note  Infectious Disease    Reason for Consult:  C difficile colitis, neutropenia, sepsis    HPI: Alin Burkett is a   70 y.o. male who is status post renal transplant and is receiving chemotherapy for diffuse large B-cell lymphoma, presented to the emergency room on 10/15 with worsening of chronic diarrhea, abdominal cramps of 1 weeks duration.  He was found to be neutropenic with a white blood cell count of 0.2, volume depleted, hypokalemic, was cultured and given fluid resuscitation and vancomycin and cefepime.  He was admitted to the intensive care unit. He was recently given cefpodoxime to take prophylactically associated with his chemotherapy for recurring urinary tract infections.  Most recent positive urine culture was September 18th with E coli sensitive to 3rd generation cephalosporins carbapenems  He has had a hectic fever, stool for C difficile toxin was obtained and was resulted as positive today.  he has had a positive C difficile test before, August 2019.  White blood cells remain depressed at 0.1, platelets are depressed at 91,000, creatinine 1.6.  CT scan of the abdomen obtained last night shows severe proctocolitis without megacolon. Neupogen has been ordered. He is discouraged from eating by severe cramps.      10/17/2020 tmax is improved. Continues to be neutropenic Continues to have cramping, intermittent, abdominal pain. + diarrhea "lost count how many" He had refused vancomycin in am but decided to take it now  10/18/2020 afebrile,(103 on 16th)  ANC improved on granix 0---1029) he is feeling much better today.  Less abdominal pain.  Less loose stools.  10/19/2020.  Afebrile.  T-max 99°.  Less need for pressors.  WBC 5.  Creatinine slightly worse at 1.9.  Discussed with nurse.  Patient may have had some hallucinations earlier  10/20/2020.  Afebrile. Less abdominal pain. Less diarrhea. He feels his abdomen is still distended and does not allow him to take a deep breath. KUB  Is read " as below, I feel that transvere colon is a little bigger today. Creatinine has worsened. Bicarb is 9. Nephrologist is giving bicarb drip.  Las Vancomycin iv and cefepime were  given on 18   Tolerating vancomycin by mouth and metronidazole    10/21: interim reviewed d/w Dr. Jiménez. Requiring bicarb drip. WBC 14 after neupogen, platelets better. Cr stable. Severe hypoalbuminemia. KUB not ominous, CXR with blunted left CPA. Wife reports he is sleeping all of the time and not eating. I aroused him and he agreed to eat some pudding  10/22: interim reviewed. Renal function slightly worse, bicarb corrected to 18 on drip. Urine output is poor and incontinent, midodrine started. Not eating.     Antibiotics (From admission, onward)    Start     Stop Route Frequency Ordered    10/17/20 0000  vancomycin 25 mg/mL oral solution 500 mg  (C. difficile Infection (CDI) Treatment Order Panel)      10/26 0559 Oral Every 6 hours 10/16/20 1755    10/16/20 1900  metronidazole IVPB 500 mg      -- IV Every 8 hours (non-standard times) 10/16/20 1755             EXAM & DIAGNOSTICS REVIEWED:   Vitals:     Temp:  [97.8 °F (36.6 °C)-99 °F (37.2 °C)]   Temp: 98.1 °F (36.7 °C) (10/22/20 1600)  Pulse: 90 (10/22/20 1730)  Resp: (!) 22 (10/22/20 1730)  BP: 102/66 (10/22/20 1730)  SpO2: 97 % (10/22/20 1730)    Intake/Output Summary (Last 24 hours) at 10/22/2020 1803  Last data filed at 10/22/2020 1628  Gross per 24 hour   Intake 4000.19 ml   Output 1125 ml   Net 2875.19 ml       General:  In NAD. Arousable and attentive, cooperative,looks better daily    Eyes:  Anicteric, EOMI  ENT:  No ulcers, exudates, thrush, nares patent, dentition  good  Neck:  Supple   Lungs: Clear, no consolidation, rales, wheezes, rub  Heart:  RRR, no gallop/murmur/rub noted  Abd:  Mild distention, hypoactive BS, no masses or organomegaly appreciated. Still generally tender throughout.   :  Voids/  Incontinent,  no flank tenderness  Musc:  Joints without effusion, swelling,  erythema, synovitis,    Skin:  No rashes.    Wound:   Neuro: Alert, attentive, speech fluent, face symmetric, moves all extremities, no focal weakness.    Psych:   cooperative, pleasant      Extrem: generalized edema, no erythema, phlebitis, cellulitis, warm and well perfused  VAD:  portacath     Isolation:  Special contact    Lines/Tubes/Drains:    General Labs reviewed:  Recent Labs   Lab 10/20/20  0521 10/21/20  0354 10/22/20  0411   WBC 12.82* 14.81* 12.13   HGB 9.3* 10.1* 10.3*   HCT 28.1* 30.0* 30.5*   PLT 46* 76* 115*       Recent Labs   Lab 10/20/20  2352 10/21/20  0354 10/22/20  0411   * 132* 135*   K 4.3 4.1 4.2   * 111* 111*   CO2 12* 11* 18*   BUN 37* 35* 34*   CREATININE 2.5* 2.6* 2.8*   CALCIUM 7.9* 7.9* 7.8*   PROT 4.2*  --   --    BILITOT 0.5  --   --    ALKPHOS 154*  --   --    ALT 18  --   --    AST 21  --   --      Micro:  Microbiology Results (last 7 days)     Procedure Component Value Units Date/Time    Urine Culture High Risk [[id number]] Collected: 10/20/20 0022    Order Status: Completed Specimen: Urine, Clean Catch Updated: 10/21/20 1928     Urine Culture, Routine No growth    Narrative:      Indicated criteria for high risk culture:->Other  Other (specify):->Neutropenic patient    Blood culture x two cultures. Draw prior to antibiotics. [[id number]] Collected: 10/15/20 1626    Order Status: Completed Specimen: Blood from Peripheral, Right Hand Updated: 10/21/20 0612     Blood Culture, Routine No growth after 5 days.    Narrative:      Aerobic and anaerobic    Blood culture x two cultures. Draw prior to antibiotics. [[id number]] Collected: 10/15/20 1546    Order Status: Completed Specimen: Blood from Antecubital, Right Arm Updated: 10/21/20 0612     Blood Culture, Routine No growth after 5 days.    Narrative:      Aerobic and anaerobic    Clostridium difficile EIA [[id number]]  (Abnormal) Collected: 10/16/20 0358    Order Status: Completed Specimen: Stool Updated: 10/16/20 1057      C. diff Antigen Positive     C difficile Toxins A+B, EIA Positive     Comment: Testing not recommended for children <24 months old.           Imaging Reviewed:   KUBThree round radiodensities overlie the left upper quadrant may represent ingested pills or be in the patient's clothing.  A radiodense thread is noted over the right lower quadrant of uncertain significance.   CXR clear   CT abdomen and pelvis 10/16 :    Findings consistent with severe proctocolitis.    Transplanted kidney right side of the pelvis with mild pelvocaliectasis and perinephric stranding nonspecific.  No calcified stones seen Additional findings as detailed above including cystic lesion with in the native right kidney versus dilatation of collecting structure of the native right kidney.  Stones in the native right kidney.  Cystic lesion within the pancreas  Cardiology:    IMPRESSION & PLAN   1. Severe C. Difficile colitis, improved   Prompted by oral antibiotics and/or chemotherapy   History of Cdiff 8/2019   Metabolic acidosis, requiring IV bicarb    2. PTLD, EBV related lymphoma, receiving chemotherapy      neutropenia, resolved  3. S/p renal transplant on immunosuppression, [facility], Cr 2.8  4. Chronic diarrhea  5. History of recurrent UTIs      Recommendations:     IV flagyl and high dose oral vancomyin for Cdiff    cholestyramine and probiotic   Needs aggressive nutritional support    out of bed as able

## 2020-10-22 NOTE — PT/OT/SLP EVAL
Physical Therapy Evaluation    Patient Name:  Alin Burkett   MRN:  [id number]    Recommendations:     Discharge Recommendations:  home health PT, nursing facility, skilled   Discharge Equipment Recommendations: walker, rolling   Barriers to discharge: Decreased caregiver support    Assessment:     Alin Burkett is a 70 y.o. male admitted with a medical diagnosis of Septic shock.  He presents with the following impairments/functional limitations:  weakness, impaired endurance, impaired self care skills, impaired functional mobilty, gait instability, impaired balance, decreased lower extremity function, impaired cardiopulmonary response to activity . Pt seen at ICU- alert, up in chair, conversant. Pt completed LE's thera ex. Standing with mod assist x2 with RW with leg giveaway- able to take few forward steps with chair following. Pt to benefit from continued therapies.    Rehab Prognosis: Fair; patient would benefit from acute skilled PT services to address these deficits and reach maximum level of function.    Recent Surgery: * No surgery found *      Plan:     During this hospitalization, patient to be seen 6 x/week to address the identified rehab impairments via gait training, therapeutic activities, therapeutic exercises and progress toward the following goals:    · Plan of Care Expires:  11/30/20    Subjective   Nurse Kavon at bedside who stated pt on Levophed due to low BP  Pt stated was active and independent 1 week ago  Family at bedside encouraging pt  Chief Complaint: pt stated of weakness and shaky legs  Patient/Family Comments/goals: get well  Pain/Comfort:  · Pain Rating 1: 0/10    Patients cultural, spiritual, [employer] conflicts given the current situation:      Living Environment:  Home with SO  Prior to admission, patients level of function was independent.  Equipment used at home: none.  DME owned (not currently used): none.  Upon discharge, patient will have assistance from family.    Objective:      Communicated with nurse Jacobson prior to session.  Patient found up in chair with blood pressure cuff, peripheral IV, pulse ox (continuous), telemetry  upon PT entry to room.    General Precautions: Standard, fall, special contact   Orthopedic Precautions:N/A   Braces: N/A     Exams:  · RLE ROM: WFL  · RLE Strength: Deficits: 3/5  · LLE ROM: WFL  · LLE Strength: Deficits: 3/5    Functional Mobility:  · Transfers:     · Sit to Stand:  moderate assistance and of 2 persons with rolling walker  · Gait: 4 steps with Rw mod assist x2 with chair following with LE's giveaway    Therapeutic Activities and Exercises:   Patient was educated on the importance of OOB activity and functional mobility to negate negative effects of prolonged bed rest during hospitalization, safe transfers and ambulation, and D/C planning   thera ex with AP,LAQ,marches  Standing tolerance with RW with early fatigue and LE's giveaway  Returned back to chair    AM-PAC 6 CLICK MOBILITY  Total Score:13     Patient left up in chair with all lines intact, call button in reach and nurse Kavon and family present.    GOALS:   Multidisciplinary Problems     Physical Therapy Goals        Problem: Physical Therapy Goal    Goal Priority Disciplines Outcome Goal Variances Interventions   Physical Therapy Goal     PT, PT/OT Ongoing, Progressing     Description: Goals to be met by: 2020     Patient will increase functional independence with mobility by performin. Supine to sit with MInimal Assistance  2. Sit to stand transfer with Minimal Assistance  3. Bed to chair transfer with Minimal Assistance using Rolling Walker  4. Gait  x 250 feet with Minimal Assistance using Rolling Walker.   5. 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  Renal allograft biopsy 17 ([facility]): 21 glomeruli, none globally sclerosed, <5% interstitial fibrosis, no ACR, c4d negative, AVR CCT Type 2 (V1 lesion); plan THYMO     Dissecting aortic aneurysm (any part), abdominal     Diverticulosis     Encounter for blood transfusion     ESRD (end stage renal disease) 2010    H/O urethral stricture 2018    H/O: urethral stricture     History of AAA (abdominal aortic aneurysm) repair     History of urethral stricture 2018    Hypertension     Hypokalemia     Hypothyroidism 1/10/2014    Inguinal hernia bilateral, non-recurrent     Kidney stones     Organ transplant candidate 2013    Plantar warts 1/10/2014    Recurrent UTI 2017    S/P kidney transplant     Secondary hyperparathyroidism, renal     Thyroid disease        Past Surgical History:   Procedure Laterality Date    ABDOMINAL SURGERY      exploratory lapatomy x 2    ABLATION N/A 2019    Procedure: ABLATION, SVT;  Surgeon: Emerson Mcmanus MD;  Location: [facility] EP LAB;  Service: Cardiology;  Laterality: N/A;  SVT, RFA, CARTO, anes, GP, 323    AORTA - SUPERIOR MESENTERIC ARTERY BYPASS GRAFT      BLADDER NECK RECONSTRUCTION      BLADDER SURGERY      COLONOSCOPY  10/10/2013    Dr. Gutierrez, repeat in 5 years    CYSTOSCOPY      CYSTOSCOPY N/A 2018    Procedure: CYSTOSCOPY;  Surgeon: Dewey Mann MD;  [facility];  Service: Urology;  Laterality: N/A;  45 min    CYSTOURETHROSCOPY WITH DIRECT VISION INTERNAL URETHROTOMY N/A 2018    Procedure:  CYSTOSCOPY, WITH DIRECT VISION INTERNAL URETHROTOMY;  Surgeon: Dewey Mann MD;  [facility];  Service: Urology;  Laterality: N/A;    DILATION OF URETHRA N/A 12/19/2018    Procedure: DILATION, URETHRA;  Surgeon: Dewey Mann MD;  [facility];  Service: Urology;  Laterality: N/A;    EXCISIONAL BIOPSY N/A 7/13/2020    Procedure: EXCISIONAL BIOPSY- LEFT INGUINAL NODE;  Surgeon: Vlad Epstein MD;  [facility];  Service: General;  Laterality: N/A;    FLEXIBLE CYSTOSCOPY N/A 11/6/2019    Procedure: CYSTOSCOPY, FLEXIBLE;  Surgeon: Deewy Mann MD;  [facility];  Service: Urology;  Laterality: N/A;    GASTROJEJUNOSTOMY      HEMORRHOID SURGERY      HERNIA REPAIR      INSERTION OF VENOUS ACCESS PORT Left 7/27/2020    Procedure: INSERTION, VENOUS ACCESS PORT;  Surgeon: Vlad Epstein MD;  Location: [facility] OR [address] [place] [address];  Service: General;  Laterality: Left;    KIDNEY TRANSPLANT      LEFT HEART CATHETERIZATION Left 8/20/2019    Procedure: Left heart cath;  Surgeon: Javan Oscar MD;  Location: [facility] CATH/EP LAB;  Service: Cardiology;  Laterality: Left;    LITHOTRIPSY      LYMPH NODE BIOPSY N/A 6/30/2020    Procedure: BIOPSY, LYMPH NODE;  Surgeon: [facility] Provider;  Location: [address];  Service: General;  Laterality: N/A;  189 lymph node biopsy /ULTRASOUND    PERCUTANEOUS NEPHROLITHOTRIPSY      right  ESWL  10/31/12    right ESWL  6/26/12    URETHROPLASTY USING PATCH GRAFT N/A 11/6/2019    Procedure: URETHROPLASTY, USING PATCH GRAFT BUCCAL MUCOSA GRAFT;  Surgeon: Dewey Mann MD;  Location: [facility] OR [address] [place] [address];  Service: Urology;  Laterality: N/A;  3 HOURS       Time Tracking:     PT Received On: 10/22/20  PT Start Time: 1503     PT Stop Time: 1524  PT Total Time (min): 21 min     Billable Minutes: Evaluation 10 and Therapeutic Exercise 11      Rubina Barnes, PT  10/22/2020

## 2020-10-22 NOTE — PROGRESS NOTES
[facility]  Patient Name: Alin Burkett  : 1950  Age: 70 y.o.  Sex: male  MRN: [id number]  Admission Date: 10/15/2020  Hospital Length of Stay: 7 days  Code Status: Full Code   Admitting Provider: Brennan Decker MD  Attending Provider: Julia Summers MD  Primary Care Physician: Carmen Krueger MD  Full Code  Subjective:     Date of Visit: 10/22/2020             Principal Problem: Septic shock    Patient ID: Alin Burkett is a 70 y.o. 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 ED workup showed pt was neutropenic with WBC 0.2 with fever. Pt was cultured and given fluid resuscitation and vancomycin and cefepime. Stool cultures positive for C. Difficile toxin. CT abd/pelvis without contrast revealed severe proctocolitis without megacolon. Pt remains pancytopenic with most recent WBC 0.11 with ANC 0.0 and H/H 9.6/29.7 and platelets 91. Pt seen at bedside with his wife and states he has chronic throbbing abdominal pain made worse by eating and has had chronic diarrhea. He states he is fatigued and has decreased appetite along with fever/chills and night sweats.    10/19/2020: Pt seen at bedside and appears cachectic. He endorses that he is still having diarrhea, but that it has improved and decreased in frequency and consistency has thickened. Pt states abdominal pain has improved, but it still present. He endorses fatigue. Pt denies hemoptysis, hematochezia, melena, hematuria.    10/20/2020: Pt seen at bedside in ICU. He states that he is feeling less lethargic. He states his diarrhea is improving and that he only has abdominal pain to palpation.    10/21/2020: Pt seen at bedside in ICU NAD. He states his abdominal pain has decreased. He is still having diarrhea but  with less frequency.     10/22/2020: Pt seen at bedside in ICU. Pt states his abdominal pain has decreased and it no longer hurt to palpate his abdomen. Pt states diarrhea is improving.     Review of Systems   Constitutional: Positive for activity change and fatigue. Negative for chills and fever.   HENT: Negative for mouth sores and trouble swallowing.    Eyes: Negative for photophobia and visual disturbance.   Respiratory: Negative for cough, chest tightness, shortness of breath, wheezing and stridor.    Cardiovascular: Negative for chest pain and leg swelling.   Gastrointestinal: Positive for abdominal distention, abdominal pain and diarrhea. Negative for constipation, nausea and vomiting.   Musculoskeletal: Negative for arthralgias, back pain and myalgias.   Skin: Negative for color change, pallor, rash and wound.   Neurological: Negative for syncope, speech difficulty and weakness.   Hematological: Negative for adenopathy. Does not bruise/bleed easily.   Psychiatric/Behavioral: Negative for agitation, behavioral problems, confusion, decreased concentration and dysphoric mood.        ONCOLOGY HISTORY:     1. Monomorphic post-transplant lymphoproliferative disorder              A. 2/2020: Noticed left inguinal lymphadenopathy after a dog bite (failed to resolve with antibiotics)              B. 6/2/2020: Saw Dr. Collins in infectious diseases for inguinal lymphadenopathy - referred for biopsy              C. 6/30/2020: Core biopsy of L inguinal lymph node shows B-cell lymphoma of germinal center origin; EBV-positive by LONNIE; morphology nondiagnostic of PTLD vs follicular lymphoma              D. 7/8/2020: PET/CT shows left internal iliac chain node measuring 3.3 x 3 cm with SUV max 37; L inguinal node measures 3.2 x 2.4 cm with SUV max 36              E. 7/13/2020: Excisional biopsy of left inguinal node shows monomorphic post-transplant lymphoproliferative disorder (DLBCL, GCB 60%, follicular lymphoma, grade 3B  40%); FISH for MYC rearrangement is negative              F. 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  Renal allograft biopsy 17 (DIVINE): 21 glomeruli, none globally sclerosed, <5% interstitial fibrosis, no ACR, c4d negative, AVR CCT Type 2 (V1 lesion); plan THYMO     Dissecting aortic aneurysm (any part), abdominal     Diverticulosis     Encounter for blood transfusion     ESRD (end stage renal disease) 2010    H/O urethral stricture 2018    H/O: urethral stricture     History of AAA (abdominal aortic aneurysm) repair     History of urethral stricture 2018    Hypertension     Hypokalemia     Hypothyroidism 1/10/2014    Inguinal hernia bilateral, non-recurrent     Kidney stones     Organ transplant candidate 2013    Plantar warts 1/10/2014    Recurrent UTI 2017    S/P kidney transplant     Secondary hyperparathyroidism, renal     Thyroid disease        Family History   Problem Relation Age of Onset    Diabetes Mother     Alzheimer's disease Mother     Alcohol abuse Father     HIV Brother     Stroke Maternal Aunt     Kidney disease Paternal Uncle     Kidney disease Cousin     No Known Problems Sister     No Known Problems Daughter     No Known Problems Sister     No Known Problems Brother     No Known Problems Brother      Cancer Brother         thyroid cancer    Colon cancer Brother     Melanoma Neg Hx     Psoriasis Neg Hx     Lupus Neg Hx     Eczema Neg Hx     Colon polyps Neg Hx     Crohn's disease Neg Hx     Ulcerative colitis Neg Hx     Celiac disease Neg Hx        Past Surgical History:   Procedure Laterality Date    ABDOMINAL SURGERY      exploratory lapatomy x 2    ABLATION N/A 8/22/2019    Procedure: ABLATION, SVT;  Surgeon: Emerson Mcmanus MD;  Location: [facility] LAB;  Service: Cardiology;  Laterality: N/A;  SVT, RFA, CARTO, anes, GP, 323    AORTA - SUPERIOR MESENTERIC ARTERY BYPASS GRAFT      BLADDER NECK RECONSTRUCTION      BLADDER SURGERY      COLONOSCOPY  10/10/2013    Dr. Gutierrez, repeat in 5 years    CYSTOSCOPY      CYSTOSCOPY N/A 12/19/2018    Procedure: CYSTOSCOPY;  Surgeon: Dewey Mann MD;  Location: [address];  Service: Urology;  Laterality: N/A;  45 min    CYSTOURETHROSCOPY WITH DIRECT VISION INTERNAL URETHROTOMY N/A 12/19/2018    Procedure: CYSTOSCOPY, WITH DIRECT VISION INTERNAL URETHROTOMY;  Surgeon: Dewey Mann MD;  Location: [address];  Service: Urology;  Laterality: N/A;    DILATION OF URETHRA N/A 12/19/2018    Procedure: DILATION, URETHRA;  Surgeon: Dewey Mann MD;  [facility];  Service: Urology;  Laterality: N/A;    EXCISIONAL BIOPSY N/A 7/13/2020    Procedure: EXCISIONAL BIOPSY- LEFT INGUINAL NODE;  Surgeon: Vlad Epstein MD;  Location: [address];  Service: General;  Laterality: N/A;    FLEXIBLE CYSTOSCOPY N/A 11/6/2019    Procedure: CYSTOSCOPY, FLEXIBLE;  Surgeon: Dewey Mann MD;  Location: [facility] OR [address] [place] [address];  Service: Urology;  Laterality: N/A;    GASTROJEJUNOSTOMY      HEMORRHOID SURGERY      HERNIA REPAIR      INSERTION OF VENOUS ACCESS PORT Left 7/27/2020    Procedure: INSERTION, VENOUS ACCESS PORT;  Surgeon: Vlad Epstein MD;  Location: [facility] OR [address] [place] [address];  Service: General;  Laterality: Left;    KIDNEY TRANSPLANT       LEFT HEART CATHETERIZATION Left 8/20/2019    Procedure: Left heart cath;  Surgeon: Javan Oscar MD;  Location: [facility] CATH/EP LAB;  Service: Cardiology;  Laterality: Left;    LITHOTRIPSY      LYMPH NODE BIOPSY N/A 6/30/2020    Procedure: BIOPSY, LYMPH NODE;  Surgeon: Ella Diagnostic Provider;  Location: [facility] OR [address] [place] [address];  Service: General;  Laterality: N/A;  189 lymph node biopsy /ULTRASOUND    PERCUTANEOUS NEPHROLITHOTRIPSY      right  ESWL  10/31/12    right ESWL  6/26/12    URETHROPLASTY USING PATCH GRAFT N/A 11/6/2019    Procedure: URETHROPLASTY, USING PATCH GRAFT BUCCAL MUCOSA GRAFT;  Surgeon: Dewey Mann MD;  Location: [facility] OR [address] [place] [address];  Service: Urology;  Laterality: N/A;  3 HOURS       Social History     Socioeconomic History    Marital status: Single     Spouse name: Not on file    Number of children: Not on file    Years of education: Not on file    Highest education level: Not on file   Occupational History     Employer: Disabled   Social Needs    Financial resource strain: Not on file    Food insecurity     Worry: Not on file     Inability: Not on file    Transportation needs     Medical: Not on file     Non-medical: Not on file   Tobacco Use    Smoking status: Former Smoker     Packs/day: 0.50     Years: 40.00     Pack years: 20.00     Types: Cigarettes     Quit date: 6/16/2010     Years since quitting: 10.3    Smokeless tobacco: Never Used   Substance and Sexual Activity    Alcohol use: Yes     Alcohol/week: 3.0 standard drinks     Types: 3 Cans of beer per week     Comment: occasional/social    Drug use: No     Comment: THC in youth    Sexual activity: Yes     Partners: Female     Birth control/protection: None   Lifestyle    Physical activity     Days per week: Not on file     Minutes per session: Not on file    Stress: Not on file   Relationships    Social connections     Talks on phone: Not on file     Gets together: Not on file     Attends Anglican service: Not on  file     Active member of club or organization: Not on file     Attends meetings of clubs or organizations: Not on file     Relationship status: Not on file   Other Topics Concern    Not on file   Social History Narrative    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       Current Facility-Administered Medications   Medication Dose Route Frequency Provider Last Rate Last Dose    acetaminophen tablet 650 mg  650 mg Oral Q4H PRN Brennan Decker MD   650 mg at 10/16/20 1259    albuterol-ipratropium 2.5 mg-0.5 mg/3 mL nebulizer solution 3 mL  3 mL Nebulization Q6H PRN Michelle Jiménez MD   3 mL at 10/20/20 1602    allopurinoL tablet 300 mg  300 mg Oral Daily Brennan Decker MD   300 mg at 10/22/20 0952    Amino acid 4.25% - dextrose 5% (CLINIMIX-E) solution with additives (1L provides 42.5 gm AA, 50 gm CHO (170 kcal/L dextrose), Na 35, K 30, Mg 5, Ca 4.5, Acetate 70, Cl 39, Phos 15)   Intravenous Continuous Julia Summers MD        aspirin EC tablet 81 mg  81 mg Oral Daily Brennan Decker MD   81 mg at 10/22/20 0952    brimonidine-timoloL 0.2-0.5 % ophthalmic solution 1 drop  1 drop Both Eyes Q12H Brennan Decker MD   1 drop at 10/22/20 0953    calcitRIOL capsule 0.5 mcg  0.5 mcg Oral Daily Brennan Decker MD   0.5 mcg at 10/22/20 0952    cholestyramine-aspartame 4 gram packet 4 g  1 packet Oral BID Michelle Jiménez MD   4 g at 10/22/20 0951    dicyclomine capsule 10 mg  10 mg Oral QID Lola Tolbert MD   10 mg at 10/22/20 1622    dronabinoL capsule 5 mg  5 mg Oral BID Julia Summers MD        famotidine tablet 20 mg  20 mg Oral QHS Brennan Decker MD   20 mg at 10/21/20 2150    ferrous sulfate EC tablet 325 mg  325 mg Oral BID Alexi Camp PA-C   325 mg at 10/22/20 0952    hyoscyamine ODT 0.125 mg  0.125 mg Sublingual Q4H PRN Lola Tolbert MD   0.125 mg at 10/17/20 1604    Lactobacillus acidoph-L.johann 1 million cell tablet 4 tablet  4 tablet Oral TID WM Michelle  MD Tino   4 tablet at 10/22/20 1622    levothyroxine tablet 100 mcg  100 mcg Oral Before breakfast Brennan Decker MD   100 mcg at 10/22/20 0535    magnesium sulfate 2g in water 50mL IVPB (premix)  2 g Intravenous PRN Brennan Decker MD        magnesium sulfate 2g in water 50mL IVPB (premix)  4 g Intravenous PRN Brennan Decker MD        metoclopramide HCl injection 10 mg  10 mg Intravenous Q6H PRN Brennan Decker MD        metronidazole IVPB 500 mg  500 mg Intravenous Q8H Lola Tolbert  mL/hr at 10/22/20 1035 500 mg at 10/22/20 1035    midodrine tablet 10 mg  10 mg Oral TID Julia Summers MD   10 mg at 10/22/20 1512    morphine injection 2 mg  2 mg Intravenous Q4H PRN Julia Summers MD   2 mg at 10/20/20 2213    NORepinephrine bitartrate 8 mg in dextrose 5% 250 mL infusion  0.02 mcg/kg/min Intravenous Continuous Brennan Decker MD 3.8 mL/hr at 10/22/20 1436 0.03 mcg/kg/min at 10/22/20 1436    ondansetron disintegrating tablet 8 mg  8 mg Oral Q6H PRN Brennan Decker MD        potassium chloride 10 mEq in 100 mL IVPB  40 mEq Intravenous PRN Brennan Decker  mL/hr at 10/20/20 0729 40 mEq at 10/20/20 0729    And    potassium chloride 10 mEq in 100 mL IVPB  60 mEq Intravenous PRN Brennan Decker MD        And    potassium chloride 10 mEq in 100 mL IVPB  80 mEq Intravenous PRN Brennan Decker MD        promethazine tablet 25 mg  25 mg Oral Q6H PRN Brennan Decker MD        sodium chloride 0.9% flush 10 mL  10 mL Intravenous PRN Brennan Decker MD        tacrolimus capsule 2 mg  2 mg Oral Daily PM Brennan Decker MD   2 mg at 10/21/20 1720    tacrolimus capsule 3 mg  3 mg Oral Daily AM Brennan Decker MD   3 mg at 10/22/20 0737    travoprost 0.004 % ophthalmic solution 1 drop  1 drop Both Eyes QHS Brennan Decker MD   1 drop at 10/21/20 2200    vancomycin 25 mg/mL oral solution 500 mg  500 mg Oral Q6H Lola Tolbert MD   500 mg at 10/22/20  1150        allopurinoL  300 mg Oral Daily    aspirin  81 mg Oral Daily    brimonidine-timoloL  1 drop Both Eyes Q12H    calcitRIOL  0.5 mcg Oral Daily    cholestyramine-aspartame  1 packet Oral BID    dicyclomine  10 mg Oral QID    dronabinoL  5 mg Oral BID    famotidine  20 mg Oral QHS    ferrous sulfate  325 mg Oral BID    Lactobacillus acidoph-L.bulgar  4 tablet Oral TID WM    levothyroxine  100 mcg Oral Before breakfast    metronidazole  500 mg Intravenous Q8H    midodrine  10 mg Oral TID    tacrolimus  2 mg Oral Daily PM    tacrolimus  3 mg Oral Daily AM    travoprost  1 drop Both Eyes QHS    vancomycin  500 mg Oral Q6H        Amino acid 4.25% - dextrose 5% (CLINIMIX-E) solution with additives (1L provides 42.5 gm AA, 50 gm CHO (170 kcal/L dextrose), Na 35, K 30, Mg 5, Ca 4.5, Acetate 70, Cl 39, Phos 15)      norepinephrine bitartrate-D5W 0.03 mcg/kg/min (10/22/20 1436)       acetaminophen, albuterol-ipratropium, hyoscyamine, magnesium sulfate IVPB, magnesium sulfate IVPB, metoclopramide HCl, morphine, ondansetron, potassium chloride in water **AND** potassium chloride in water **AND** potassium chloride in water, promethazine, sodium chloride 0.9%    Antibiotics (From admission, onward)    Start     Stop Route Frequency Ordered    10/17/20 0000  vancomycin 25 mg/mL oral solution 500 mg  (C. difficile Infection (CDI) Treatment Order Panel)      10/26 0559 Oral Every 6 hours 10/16/20 1755    10/16/20 1900  metronidazole IVPB 500 mg      -- IV Every 8 hours (non-standard times) 10/16/20 1755          Review of patient's allergies indicates:  No Known Allergies  All medications and past history have been reviewed.    Objective:      Vitals:  Patient Vitals for the past 24 hrs:   BP Temp Temp src Pulse Resp SpO2 Weight   10/22/20 1630 (!) 89/66 -- -- 92 (!) 21 99 % --   10/22/20 1600 99/66 98.1 °F (36.7 °C) Oral 93 (!) 22 98 % --   10/22/20 1530 (!) 87/63 -- -- 94 (!) 25 96 % --   10/22/20 0054  104/69 -- -- 97 (!) 28 96 % --   10/22/20 1505 95/69 -- -- 94 (!) 37 97 % --   10/22/20 1430 115/77 -- -- 94 18 99 % --   10/22/20 1400 109/73 -- -- 93 18 99 % --   10/22/20 1330 99/67 -- -- 91 (!) 21 99 % --   10/22/20 1300 93/70 -- -- 98 (!) 22 95 % --   10/22/20 1230 110/74 -- -- 96 19 98 % --   10/22/20 1200 97/63 97.8 °F (36.6 °C) Axillary 99 (!) 25 (!) 71 % --   10/22/20 1130 99/69 -- -- 98 17 100 % --   10/22/20 1100 100/69 -- -- 99 19 98 % --   10/22/20 1030 99/68 -- -- 100 18 98 % --   10/22/20 1000 97/67 -- -- 97 (!) 21 (!) 84 % --   10/22/20 0930 117/83 -- -- 99 17 100 % --   10/22/20 0900 110/74 -- -- 97 18 99 % --   10/22/20 0830 109/75 -- -- 98 17 98 % --   10/22/20 0800 100/71 98.3 °F (36.8 °C) Axillary 101 18 99 % --   10/22/20 0700 112/70 -- -- 101 18 98 % --   10/22/20 0600 110/75 -- -- 99 18 97 % 78.7 kg (173 lb 8 oz)   10/22/20 0545 110/83 -- -- 97 20 96 % --   10/22/20 0530 107/80 -- -- 97 (!) 26 96 % --   10/22/20 0515 111/78 -- -- 106 (!) 29 (!) 89 % --   10/22/20 0500 110/75 -- -- 100 17 100 % --   10/22/20 0445 113/76 -- -- 103 20 99 % --   10/22/20 0430 105/67 -- -- 102 20 99 % --   10/22/20 0415 111/67 -- -- 104 (!) 25 100 % --   10/22/20 0400 119/76 -- -- 104 18 99 % --   10/22/20 0345 113/72 -- -- 101 19 99 % --   10/22/20 0330 117/75 98.7 °F (37.1 °C) Oral 98 19 99 % --   10/22/20 0315 120/81 -- -- 101 18 99 % --   10/22/20 0300 118/82 -- -- 99 20 98 % --   10/22/20 0245 114/79 -- -- 104 19 99 % --   10/22/20 0230 121/80 -- -- 103 19 99 % --   10/22/20 0215 117/76 -- -- 104 19 99 % --   10/22/20 0200 119/80 -- -- 103 19 100 % --   10/22/20 0145 120/78 -- -- 104 19 98 % --   10/22/20 0130 (!) 127/91 -- -- 97 16 99 % --   10/22/20 0115 124/80 -- -- 101 18 99 % --   10/22/20 0100 124/76 -- -- 100 15 100 % --   10/22/20 0045 116/74 -- -- 100 17 99 % --   10/22/20 0030 118/83 -- -- 100 20 100 % --   10/22/20 0015 124/81 99 °F (37.2 °C) Oral 101 20 99 % --   10/22/20 0000 115/81 -- -- 100 20  99 % --   10/21/20 2345 111/78 -- -- 101 19 99 % --   10/21/20 2330 113/80 -- -- 101 (!) 28 96 % --   10/21/20 2315 121/81 -- -- 102 18 98 % --   10/21/20 2300 116/81 -- -- 102 17 99 % --   10/21/20 2245 113/81 -- -- 101 18 99 % --   10/21/20 2230 127/82 -- -- 101 20 99 % --   10/21/20 2215 124/81 -- -- 103 (!) 21 98 % --   10/21/20 2200 110/84 -- -- 105 (!) 22 99 % --   10/21/20 2145 130/86 -- -- 101 (!) 21 99 % --   10/21/20 2130 121/85 -- -- 105 19 97 % --   10/21/20 2115 121/81 -- -- 106 18 99 % --   10/21/20 2100 119/84 -- -- 102 17 99 % --   10/21/20 2045 121/85 -- -- 105 18 99 % --   10/21/20 2030 119/83 -- -- 106 19 99 % --   10/21/20 2015 120/80 -- -- 103 18 99 % --   10/21/20 2000 116/76 -- -- 104 20 99 % --   10/21/20 1945 116/72 -- -- 105 18 99 % --   10/21/20 1930 111/81 -- -- 103 17 97 % --   10/21/20 1915 122/82 98.7 °F (37.1 °C) Oral 102 20 96 % --   10/21/20 1906 121/76 -- -- 105 (!) 22 99 % --   10/21/20 1900 -- -- -- 102 20 99 % --   10/21/20 1745 120/84 -- -- 101 (!) 35 (!) 86 % --   10/21/20 1730 133/86 -- -- 102 (!) 24 100 % --   10/21/20 1715 116/82 -- -- 102 17 99 % --   10/21/20 1700 124/80 -- -- 101 20 98 % --      Body mass index is 24.2 kg/m².  Body surface area is 1.99 meters squared.    Last 24 Hours:    Intake/Output Summary (Last 24 hours) at 10/22/2020 1652  Last data filed at 10/22/2020 1628  Gross per 24 hour   Intake 4000.19 ml   Output 1125 ml   Net 2875.19 ml     Weight Readings:  Wt Readings from Last 5 Encounters:   10/22/20 78.7 kg (173 lb 8 oz)   10/10/20 68.5 kg (151 lb)   10/09/20 66.4 kg (146 lb 6.2 oz)   10/02/20 65.8 kg (145 lb)   09/11/20 68.6 kg (151 lb 2 oz)      Blood Type:  B POS     Physical Exam  Constitutional:       Appearance: He is ill-appearing.   HENT:      Head: Normocephalic and atraumatic.      Right Ear: External ear normal.      Left Ear: External ear normal.      Nose: Nose normal. No congestion or rhinorrhea.   Eyes:      General:         Right eye:  No discharge.         Left eye: No discharge.      Extraocular Movements: Extraocular movements intact.      Conjunctiva/sclera: Conjunctivae normal.      Pupils: Pupils are equal, round, and reactive to light.   Neck:      Musculoskeletal: Normal range of motion and neck supple. No neck rigidity.   Cardiovascular:      Rate and Rhythm: Normal rate and regular rhythm.      Heart sounds: Normal heart sounds. No murmur.   Pulmonary:      Effort: Pulmonary effort is normal. No respiratory distress.      Breath sounds: Normal breath sounds. No stridor. No wheezing, rhonchi or rales.   Abdominal:      General: Bowel sounds are normal. There is distension.      Palpations: Abdomen is soft.      Tenderness: There is abdominal tenderness (LLQ). There is no guarding.   Musculoskeletal: Normal range of motion.         General: No deformity.      Right lower leg: No edema.      Left lower leg: No edema.   Lymphadenopathy:      Cervical: No cervical adenopathy.   Skin:     General: Skin is warm and dry.      Coloration: Skin is not pale.      Findings: Bruising (bialteral UE and chest) present. No erythema or rash.   Neurological:      General: No focal deficit present.      Mental Status: He is alert and oriented to person, place, and time. Mental status is at baseline.      Cranial Nerves: No cranial nerve deficit.   Psychiatric:         Mood and Affect: Mood normal.         Behavior: Behavior normal.         Thought Content: Thought content normal.         Judgment: Judgment normal.         Labs:  Recent Labs   Lab 10/20/20  0521 10/21/20  0354 10/22/20  0411   WBC 12.82* 14.81* 12.13   RBC 2.92* 3.15* 3.25*   HGB 9.3* 10.1* 10.3*   HCT 28.1* 30.0* 30.5*   PLT 46* 76* 115*   MCV 96 95 94     Recent Labs   Lab 10/20/20  2352 10/21/20  0354 10/22/20  0411   * 132* 135*   K 4.3 4.1 4.2   * 111* 111*   CO2 12* 11* 18*   BUN 37* 35* 34*   CREATININE 2.5* 2.6* 2.8*   GLU 92 92 103   CALCIUM 7.9* 7.9* 7.8*   PHOS  --   2.8  --    ALKPHOS 154*  --   --    PROT 4.2*  --   --    ALBUMIN 1.5* 1.4*  --    BILITOT 0.5  --   --    AST 21  --   --    ALT 18  --   --        Imaging:  Results for orders placed or performed during the hospital encounter of 10/15/20 (from the past 2160 hour(s))   CT Abdomen Pelvis  Without Contrast    Impression    Findings consistent with severe proctocolitis.    Transplanted kidney right side of the pelvis with mild pelvocaliectasis and perinephric stranding nonspecific.  No calcified stones seen    Additional findings as detailed above including cystic lesion with in the native right kidney versus dilatation of collecting structure of the native right kidney.  Stones in the native right kidney.  Cystic lesion within the pancreas.    Final read    Virtual Radiology concordant      Electronically signed by: Ada Castillo MD  Date:    10/16/2020  Time:    08:56   Results for orders placed or performed during the hospital encounter of 08/06/20 (from the past 2160 hour(s))   CT Chest Abdomen Pelvis Without Contrast (XPD)    Impression    No acute abdominal pathology identified.    Nonvisualization of the left kidney with malrotation of the right kidney and multiple nonobstructing renal stones.  No hydronephrosis.    Right pelvic renal allograft with no evidence for renal allograft focal lesion, nephrolithiasis, or hydronephrosis.    Stable mild ectasia of the infrarenal abdominal aorta measuring up to 2.6 cm without evidence for aortic aneurysm or other acute aortic pathology.  Atherosclerosis identified.    Multiple enlarged lymph nodes in the left inguinal region, with interval dissection of multiple left inguinal and pelvic lymph nodes when compared to nuclear medicine PET-CT 07/08/2020.  Correlate with biopsy results.    Stable cystic appearing lesion in the pancreatic head measuring 1.2 cm.    Other findings as above.    Electronically signed by resident: Mati  Melia  Date:    08/06/2020  Time:    09:23    Electronically signed by: Jayson Staples MD  Date:    08/06/2020  Time:    10:34     *Note: Due to a large number of results and/or encounters for the requested time period, some results have not been displayed. A complete set of results can be found in Results Review.     PET CT 07/08/2020  FINDINGS:  Quality of the study: Adequate.     In the neck, there is no abnormal hypermetabolic activity worrisome for malignancy.  Vascular stent identified in the left axillary region.  No significant lymphadenopathy.     In the chest, there is no abnormal hypermetabolic activity worsened malignancy.  Coronary atherosclerosis.  0.4 cm pulmonary nodule identified in the left upper lobe (axial series 3, image 67), lesion is too small to characterize with PET-CT. Recommend attenuation expected follow-up examinations. No significant lymphadenopathy.     In the abdomen and pelvis, there is hypermetabolic lymphadenopathy throughout the left internal/external iliac chain and left groin.  New left internal iliac chain node measures 3.3 x 3 0 cm with SUV max of 37 (axial fused image 193).  Left inguinal node measures approximately 3.2 x 2.4 cm with SUV max of 36 (axial fused image 218), previously measured up to 1.4 cm.  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  The right kidney appears atrophic with abnormal contour parenchymal calcifications, unchanged.  Right lower quadrant transplant kidney in place.  Stable small bladder diverticulum.  Stable 1.2 cm pancreatic cyst, better characterized on most recent CT with IV contrast.  Stable bilateral probable adrenal adenomas.  Colonic diverticulosis without evidence of acute diverticulitis.  Stable fusiform abdominal aortic ectasia.     Spleen appears upper limit of normal for size measuring 12.0 cm in craniocaudal dimension.  Normal heterogeneous uptake similar to liver.     In the bones, there is no abnormal activity worrisome for malignancy.   Additional focus of increased uptake identified within the myofascial structures of the thigh, just deep to the left femur with SUV max of 27 (axial fused image 269).     Impression:     Hypermetabolic lymphadenopathy throughout the left iliac chain and left groin with additional hypermetabolic focus in the left thigh.  No hypermetabolic juan david disease above the diaphragm.  The Deauville score is 5.    All lab results and imaging results have been reviewed.    Assessment and Plan:      Present on Admission:   Febrile neutropenia   Septic shock   Acquired hypothyroidism   Post-transplant lymphoproliferative disorder (PTLD)   Acute renal failure superimposed on stage 3 chronic kidney disease   Hypokalemia due to excessive gastrointestinal loss of potassium   Anemia due to chemotherapy   Anemia in stage 3 chronic kidney disease   Moderate malnutrition   Metabolic acidosis       Post-transplant lymphoproliferative disorder (PTLD)  -Cycle 4 RCHOP completed on 10/09/2020.  -Please have patient follow up with Primary Oncologist Dr. Luis Fernando Lopez within 72 hours of discharge.      C-difficile colitis  -Follow ID recommendations.     [facility]  -Followed by Nephrology      Normocytic Anemia with Thrombocytopenia  -Secondary to chemotherapy and disease.  -Neutropenia resolved. Pt given Granix 300mcg daily x 3 doses and ANC WNL with last granix dose administered 10/18/2020.   -Pt on ferrous sulfate 325mg BID for iron deficiency. Anemia improving with most recent H/H increasing to 10.3/30.5.   -Platelets have improved to 115. Continue to monitor.   -Continue abx regimen for C. Difficile.    Sincerely,  Alexi Camp PA-C    Note is available for collaborating MD; Dr. Cathy Stafford for review.    Electronically signed by: Alexi Camp PA-C

## 2020-10-22 NOTE — PLAN OF CARE
Problem: Physical Therapy Goal  Goal: Physical Therapy Goal  Description: Goals to be met by: 2020     Patient will increase functional independence with mobility by performin. Supine to sit with MInimal Assistance  2. Sit to stand transfer with Minimal Assistance  3. Bed to chair transfer with Minimal Assistance using Rolling Walker  4. Gait  x 250 feet with Minimal Assistance using Rolling Walker.   5. Lower extremity exercise program x20 reps     Outcome: Ongoing, Progressing   PT eval and treat completed. Standing and few steps with RW min assist x2. Pt with LE weakness/unsteadiness/giveaway. Pt to benefit from continued therapies SNF vs HHPT depending on progress

## 2020-10-22 NOTE — ASSESSMENT & PLAN NOTE
Patient's anemia is currently controlled. Has not received any PRBCs to date.. Etiology likely d/t  anemia of chronic disease  Current CBC reviewed-   Lab Results   Component Value Date    HGB 10.3 (L) 10/22/2020    HCT 30.5 (L) 10/22/2020     Monitor serial CBC and transfuse if patient becomes hemodynamically unstable, symptomatic or H/H drops below 7/21.

## 2020-10-22 NOTE — PLAN OF CARE
Informed on clabsi care. Encouraged diet intake. Will begin PPN for supplemental nutrishional needs. 1rst dose midodrine info explained. Assist OOB to sit in BS chair approx 1hr. dirk well. Weaning levophed down as tolerated. All safety maintained.

## 2020-10-22 NOTE — PROGRESS NOTES
" INPATIENT NEPHROLOGY PROGRESS  [facility] NEPHROLOGY    Alin Burkett  10/22/2020    Reason for consultation:    Acute kidney injury     Chief Complaint:   Chief Complaint   Patient presents with    Diarrhea     for the past 3 days,last received chemotherapy 6 days ago.     Abdominal Pain          History of Present Illness:    Per H and P    Patient has been having diarrhea with "jelly-like" consistency as well as crampiness in the abdomen.  Symptoms began roughly one week ago.  Also has had fever and malaise.  Appetite poor.  Fatigue as well.  He has been receiving chemo for PTLD; most recent cycle of CHOP was end of last week.  He has history of renal transplant four years ago and is currently on twice-a-day  Prograf.  He's had no cough, no unusual headache, no sinus pain, and no burning on urination.     10/20  Has some diarrhea and abdominal pain.  No nausea, chest pain, sob, new neuro symptoms, new joint pain.  He is very weak.    I told him that he had previously been seen by doctor Merchant and I would call him to see him.  He stated he didn't want to see Dr Merchant and requested that I become his nephrologist.    10/21  Not much change in renal function since yesterday. UOP 1.3L.  Sleeping quietly.    Plan of Care:       Assessment:    Acute kidney injury likely hemodynamically mediated  --urine sodium low implicating renal hypoperfusion   --Avoid NSAIDS, Sales II inhibitors, and other non-essential nephrotoxic agents  --renal dose medication for crcl 10-50  --keep map above 55       nongap metabolic acidosis likely combination of GI losses and renal tubular defect (urine pH inappropriately high)  - urine anion gap not accurate due to low urine sodium  --can switch to normal saline    S/p renal transplant  --tacrolimus level therapeutic    Hyponatremia  --better  --avoid hypotonic iv piggy backs  --no ssri antidepressants or thiazide diuretics                   Thank you for allowing us to participate in " this patient's care. We will continue to follow.    Vital Signs:  Temp Readings from Last 3 Encounters:   10/22/20 98.3 °F (36.8 °C) (Axillary)   10/10/20 98.3 °F (36.8 °C) (Oral)   10/09/20 98.6 °F (37 °C)       Pulse Readings from Last 3 Encounters:   10/22/20 101   10/10/20 95   10/09/20 (!) 56       BP Readings from Last 3 Encounters:   10/22/20 100/71   10/10/20 102/63   10/09/20 115/76       Weight:  Wt Readings from Last 3 Encounters:   10/22/20 78.7 kg (173 lb 8 oz)   10/10/20 68.5 kg (151 lb)   10/09/20 66.4 kg (146 lb 6.2 oz)       Past Medical & Surgical History:  Past Medical History:   Diagnosis Date    Acidosis     Adrenal adenoma     Anemia associated with chronic renal failure     Arrhythmia, onset 2015    Awaiting organ transplant status 2013    Basal cell carcinoma 2012    left nasal tip    Blood type B+ 2013    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  Renal allograft biopsy 17 (DIVINE): 21 glomeruli, none globally sclerosed, <5% interstitial fibrosis, no ACR, c4d negative, AVR CCT Type 2 (V1 lesion); plan THYMO     Dissecting aortic aneurysm (any part), abdominal     Diverticulosis     Encounter for blood transfusion     ESRD (end stage renal disease) 2010    H/O urethral stricture 2018    H/O: urethral stricture     History of AAA (abdominal aortic aneurysm) repair     History of urethral stricture 2018    Hypertension     Hypokalemia     Hypothyroidism 1/10/2014    Inguinal hernia bilateral, non-recurrent     Kidney stones     Organ transplant candidate 2013    Plantar warts 1/10/2014    Recurrent UTI 2017    S/P kidney transplant     Secondary  hyperparathyroidism, renal     Thyroid disease        Past Surgical History:   Procedure Laterality Date    ABDOMINAL SURGERY      exploratory lapatomy x 2    ABLATION N/A 8/22/2019    Procedure: ABLATION, SVT;  Surgeon: Emerson Mcmanus MD;  Location: [facility] EP LAB;  Service: Cardiology;  Laterality: N/A;  SVT, RFA, CARTO, anes, GP, 323    AORTA - SUPERIOR MESENTERIC ARTERY BYPASS GRAFT      BLADDER NECK RECONSTRUCTION      BLADDER SURGERY      COLONOSCOPY  10/10/2013    Dr. Gutierrez, repeat in 5 years    CYSTOSCOPY      CYSTOSCOPY N/A 12/19/2018    Procedure: CYSTOSCOPY;  Surgeon: Dewey Mann MD;  Location: [facility] OR 1ST FLR;  Service: Urology;  Laterality: N/A;  45 min    CYSTOURETHROSCOPY WITH DIRECT VISION INTERNAL URETHROTOMY N/A 12/19/2018    Procedure: CYSTOSCOPY, WITH DIRECT VISION INTERNAL URETHROTOMY;  Surgeon: Dewey Mann MD;  [facility];  Service: Urology;  Laterality: N/A;    DILATION OF URETHRA N/A 12/19/2018    Procedure: DILATION, URETHRA;  Surgeon: Dewey Mann MD;  [facility];  Service: Urology;  Laterality: N/A;    EXCISIONAL BIOPSY N/A 7/13/2020    Procedure: EXCISIONAL BIOPSY- LEFT INGUINAL NODE;  Surgeon: Vlad Epstein MD;  [facility];  Service: General;  Laterality: N/A;    FLEXIBLE CYSTOSCOPY N/A 11/6/2019    Procedure: CYSTOSCOPY, FLEXIBLE;  Surgeon: Dewey Mann MD;  [facility];  Service: Urology;  Laterality: N/A;    GASTROJEJUNOSTOMY      HEMORRHOID SURGERY      HERNIA REPAIR      INSERTION OF VENOUS ACCESS PORT Left 7/27/2020    Procedure: INSERTION, VENOUS ACCESS PORT;  Surgeon: Vlad Epstein MD;  [facility];  Service: General;  Laterality: Left;    KIDNEY TRANSPLANT      LEFT HEART CATHETERIZATION Left 8/20/2019    Procedure: Left heart cath;  Surgeon: Javan Oscar MD;  Location: [facility] CATH/EP LAB;  Service: Cardiology;  Laterality: Left;    LITHOTRIPSY       LYMPH NODE BIOPSY N/A 6/30/2020    Procedure: BIOPSY, LYMPH NODE;  Surgeon: Ella Diagnostic Provider;  [facility];  Service: General;  Laterality: N/A;  189 lymph node biopsy /ULTRASOUND    PERCUTANEOUS NEPHROLITHOTRIPSY      right  ESWL  10/31/12    right ESWL  6/26/12    URETHROPLASTY USING PATCH GRAFT N/A 11/6/2019    Procedure: URETHROPLASTY, USING PATCH GRAFT BUCCAL MUCOSA GRAFT;  Surgeon: Dewey Mann MD;  [facility];  Service: Urology;  Laterality: N/A;  3 HOURS       Past Social History:  Social History     Socioeconomic History    Marital status: Single     Spouse name: Not on file    Number of children: Not on file    Years of education: Not on file    Highest education level: Not on file   Occupational History     Employer: Disabled   Social Needs    Financial resource strain: Not on file    Food insecurity     Worry: Not on file     Inability: Not on file    Transportation needs     Medical: Not on file     Non-medical: Not on file   Tobacco Use    Smoking status: Former Smoker     Packs/day: 0.50     Years: 40.00     Pack years: 20.00     Types: Cigarettes     Quit date: 6/16/2010     Years since quitting: 10.3    Smokeless tobacco: Never Used   Substance and Sexual Activity    Alcohol use: Yes     Alcohol/week: 3.0 standard drinks     Types: 3 Cans of beer per week     Comment: occasional/social    Drug use: No     Comment: THC in youth    Sexual activity: Yes     Partners: Female     Birth control/protection: None   Lifestyle    Physical activity     Days per week: Not on file     Minutes per session: Not on file    Stress: Not on file   Relationships    Social connections     Talks on phone: Not on file     Gets together: Not on file     Attends Yazdanism service: Not on file     Active member of club or organization: Not on file     Attends meetings of clubs or organizations: Not on file     Relationship status: Not on file   Other Topics Concern     Not on file   Social History Narrative    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      Current Outpatient Medications on File Prior to Encounter   Medication Sig Dispense Refill    allopurinoL (ZYLOPRIM) 300 MG tablet Take 1 tablet (300 mg total) by mouth once daily. 30 tablet 2    aspirin (ECOTRIN) 81 MG EC tablet Take 1 tablet (81 mg total) by mouth once daily.  0    calcitRIOL (ROCALTROL) 0.5 MCG Cap Take 1 capsule (0.5 mcg total) by mouth once daily. 30 capsule 11    cefpodoxime (VANTIN) 100 MG tablet Take 1 tablet (100 mg total) by mouth every 12 (twelve) hours. 60 tablet 3    COMBIGAN 0.2-0.5 % Drop Place 1 drop into both eyes 2 (two) times a day.       famotidine (PEPCID) 20 MG tablet TAKE 1 TABLET EVERY EVENING (Patient taking differently: Take 20 mg by mouth every evening. ) 90 tablet 3    ketoconazole (NIZORAL) 200 mg Tab TAKE ONE-HALF (1/2) TABLET ONCE DAILY (Patient taking differently: Take 100 mg by mouth once daily. ) 45 tablet 3    levothyroxine (SYNTHROID) 100 MCG tablet TAKE 1 TABLET DAILY (Patient taking differently: Take 100 mcg by mouth before breakfast. Administer on an empty stomach at least 30 minutes before food. If receiving tube feeds, HOLD tube feeds for 1 hour before and after levothyroxine administration.) 90 tablet 3    magnesium oxide (MAG-OX) 400 mg (241.3 mg magnesium) tablet Take 1 tablet (400 mg total) by mouth once daily. 90 tablet 3    multivitamin (ONE DAILY MULTIVITAMIN) per tablet Take 1 tablet by mouth once daily.      ondansetron (ZOFRAN-ODT) 8 MG TbDL Dissolve 1 tablet (8 mg total) by mouth every 12 (twelve) hours as needed. (Patient taking differently: Take 8 mg by mouth every 12 (twelve) hours as needed (nausea). ) 21 tablet 1    predniSONE (DELTASONE) 50 MG Tab Take 2 tablets (100 mg total) by mouth once daily. Take on days 2-5 of your chemotherapy  "cycles. 8 tablet 0    sodium bicarbonate 650 MG tablet Take 1 tablet (650 mg total) by mouth 2 (two) times daily. 540 tablet 3    tacrolimus (PROGRAF) 1 MG Cap Take 3 capsules (3 mg total) by mouth every morning AND 2 capsules (2 mg total) every evening. Z94.0/Kidney Transplant on 11/26/16. 150 capsule 11    travoprost (TRAVATAN Z) 0.004 % ophthalmic solution Place 1 drop into both eyes every evening.        Scheduled Meds:   allopurinoL  300 mg Oral Daily    aspirin  81 mg Oral Daily    brimonidine-timoloL  1 drop Both Eyes Q12H    calcitRIOL  0.5 mcg Oral Daily    cholestyramine-aspartame  1 packet Oral BID    dicyclomine  10 mg Oral QID    dronabinoL  2.5 mg Oral BID    famotidine  20 mg Oral QHS    ferrous sulfate  325 mg Oral BID    Lactobacillus acidoph-L.bulgar  4 tablet Oral TID WM    levothyroxine  100 mcg Oral Before breakfast    metronidazole  500 mg Intravenous Q8H    tacrolimus  2 mg Oral Daily PM    tacrolimus  3 mg Oral Daily AM    travoprost  1 drop Both Eyes QHS    vancomycin  500 mg Oral Q6H     Continuous Infusions:   norepinephrine bitartrate-D5W 0.04 mcg/kg/min (10/21/20 9915)    custom IV infusion builder 100 mL/hr at 10/22/20 0832     PRN Meds:.acetaminophen, albuterol-ipratropium, hyoscyamine, magnesium sulfate IVPB, magnesium sulfate IVPB, metoclopramide HCl, morphine, ondansetron, potassium chloride in water **AND** potassium chloride in water **AND** potassium chloride in water, promethazine, sodium chloride 0.9%    Allergies:  Patient has no known allergies.    Past Family History:  Reviewed; refer to Hospitalist Admission Note    Review of Systems:  Review of Systems - All 14 systems reviewed and negative, except as noted in HPI    Physical Exam:    /71 (BP Location: Right arm, Patient Position: Lying)   Pulse 101   Temp 98.3 °F (36.8 °C) (Axillary)   Resp 18   Ht 5' 11" (1.803 m)   Wt 78.7 kg (173 lb 8 oz)   SpO2 99%   BMI 24.20 kg/m²     General " Appearance:    Alert, cooperative, no distress, appears stated age   Head:    Normocephalic, without obvious abnormality, atraumatic   Eyes:    PER, conjunctiva/corneas clear, EOM's intact in both eyes        Throat:   Lips, mucosa, and tongue normal; teeth and gums normal   Back:     Symmetric, no curvature, ROM normal, no CVA tenderness   Lungs:     Clear to auscultation bilaterally, respirations unlabored   Chest wall:    No tenderness or deformity   Heart:    Regular rate and rhythm, S1 and S2 normal, no murmur, rub   or gallop   Abdomen:     Tender to palpation   Extremities:   Extremities normal, atraumatic, no cyanosis or edema   Pulses:   2+ and symmetric all extremities   MSK:   No joint or muscle swelling, tenderness or deformity   Skin:   Skin color, texture, turgor normal, no rashes or lesions   Neurologic:   CNII-XII intact, normal strength and sensation       Throughout.  No flap     Results:  Lab Results   Component Value Date     (L) 10/22/2020    K 4.2 10/22/2020     (H) 10/22/2020    CO2 18 (L) 10/22/2020    BUN 34 (H) 10/22/2020    CREATININE 2.8 (H) 10/22/2020    CALCIUM 7.8 (L) 10/22/2020    ANIONGAP 6 (L) 10/22/2020    ESTGFRAFRICA 25 (A) 10/22/2020    EGFRNONAA 22 (A) 10/22/2020       Lab Results   Component Value Date    CALCIUM 7.8 (L) 10/22/2020    PHOS 2.8 10/21/2020       Recent Labs   Lab 10/22/20  0411   WBC 12.13   RBC 3.25*   HGB 10.3*   HCT 30.5*   *   MCV 94   MCH 31.7*   MCHC 33.8          I have personally reviewed pertinent radiological imaging and reports.

## 2020-10-22 NOTE — ASSESSMENT & PLAN NOTE
Creatinine trending up   BMP reviewed- noted Estimated Creatinine Clearance: 26.5 mL/min (A) (based on SCr of 2.8 mg/dL (H)). according to latest data.   Monitor UOP and serial BMP and adjust therapy as needed. Renally dose meds.

## 2020-10-23 NOTE — PT/OT/SLP PROGRESS
Physical Therapy Treatment    Patient Name:  Alin Burkett   MRN:  [id number]    Recommendations:     Discharge Recommendations:  nursing facility, skilled, rehabilitation facility, LTACH (long-term acute care hospital)   Discharge Equipment Recommendations: walker, rolling   Barriers to discharge: Decreased caregiver support    Assessment:     Alin Burkett is a 70 y.o. male admitted with a medical diagnosis of Septic shock.  He presents with the following impairments/functional limitations:  weakness, impaired endurance, impaired self care skills, impaired functional mobilty, gait instability, impaired balance, decreased lower extremity function, decreased safety awareness, impaired cardiopulmonary response to activity . Pt presents with LE's weakness, with giveaway and a high fall risk. Pt tolerating increased standing time but legs are shaky. Pt able to take steps to chair. Pt was previously independent- will need continued therapies.    Rehab Prognosis: Fair; patient would benefit from acute skilled PT services to address these deficits and reach maximum level of function.    Recent Surgery: * No surgery found *      Plan:     During this hospitalization, patient to be seen 6 x/week to address the identified rehab impairments via gait training, therapeutic activities, therapeutic exercises and progress toward the following goals:    · Plan of Care Expires:  11/30/20    Subjective   Spouse at bedside encouraging pt  Pt interactive and pleasant- has difficulty completing exercises  Pt seen post hygiene care- still with diarrhea but less watery  Chief Complaint: weak legs- it feels heavy  Patient/Family Comments/goals: get well  Pain/Comfort:  · Pain Rating 1: 0/10      Objective:     Communicated with nurse turpin prior to session.  Patient found HOB elevated with blood pressure cuff, peripheral IV, pulse ox (continuous), telemetry upon PT entry to room.     General Precautions: Standard, special contact, fall    Orthopedic Precautions:N/A   Braces: N/A     Functional Mobility:  · Bed Mobility:     · Rolling Right: minimum assistance  · Scooting: moderate assistance  · Supine to Sit: moderate assistance  · Transfers:     · Sit to Stand:  moderate assistance and of 2 persons with rolling walker  · Bed to Chair: moderate assistance with  rolling walker and assist x2  using  Stand Pivot  Pt able to take few steps with RW mod assist x2    AM-PAC 6 CLICK MOBILITY          Therapeutic Activities and Exercises:   Patient was educated on the importance of OOB activity and functional mobility to negate negative effects of prolonged bed rest during hospitalization, safe transfers and ambulation, and D/C planning   thera ex in supine with AP,QS,assisted SLR x 10-20 reps  EOB sitting with good static sitting balance  Standing with increased time  OOB to chair taking few steps with LE's instability  Tray table in front    Patient left up in chair with all lines intact, call button in reach, nurse Kavon notified and Reema PHAN present..    GOALS:   Multidisciplinary Problems     Physical Therapy Goals        Problem: Physical Therapy Goal    Goal Priority Disciplines Outcome Goal Variances Interventions   Physical Therapy Goal     PT, PT/OT Ongoing, Progressing     Description: Goals to be met by: 2020     Patient will increase functional independence with mobility by performin. Supine to sit with MInimal Assistance  2. Sit to stand transfer with Minimal Assistance  3. Bed to chair transfer with Minimal Assistance using Rolling Walker  4. Gait  x 250 feet with Minimal Assistance using Rolling Walker.   5. Lower extremity exercise program x20 reps                      Time Tracking:     PT Received On: 10/23/20  PT Start Time: 1316     PT Stop Time: 1344  PT Total Time (min): 28 min     Billable Minutes: Therapeutic Activity 18 and Therapeutic Exercise 10    Treatment Type: Treatment  PT/PTA: PT     PTA Visit Number: 0      Rubina Barnes, PT  10/23/2020

## 2020-10-23 NOTE — RESPIRATORY THERAPY
10/22/20 1930   Patient Assessment/Suction   Level of Consciousness (AVPU) alert   Respiratory Effort Normal;Unlabored   PRE-TX-O2   O2 Device (Oxygen Therapy) room air   SpO2 97 %   Pulse Oximetry Type Continuous   Pulse 92   Resp 19   /77   Aerosol Therapy   $ Aerosol Therapy Charges PRN treatment not required   Respiratory Treatment Status (SVN) PRN treatment not required

## 2020-10-23 NOTE — CARE UPDATE
This note also relates to the following rows which could not be included:  BP - Cannot attach notes to unvalidated device data       10/23/20 0858   Patient Assessment/Suction   Level of Consciousness (AVPU) alert   Respiratory Effort Normal;Unlabored   PRE-TX-O2   O2 Device (Oxygen Therapy) room air   SpO2 99 %   Pulse Oximetry Type Continuous   $ Pulse Oximetry - Multiple Charge Pulse Oximetry - Multiple   Pulse 91   Resp 19   Aerosol Therapy   $ Aerosol Therapy Charges PRN treatment not required   Respiratory Treatment Status (SVN) PRN treatment not required

## 2020-10-23 NOTE — PROGRESS NOTES
"Progress Note  Infectious Disease    Reason for Consult:  C difficile colitis, neutropenia, sepsis    HPI: Alin Burkett is a   70 y.o. male who is status post renal transplant and is receiving chemotherapy for diffuse large B-cell lymphoma, presented to the emergency room on 10/15 with worsening of chronic diarrhea, abdominal cramps of 1 weeks duration.  He was found to be neutropenic with a white blood cell count of 0.2, volume depleted, hypokalemic, was cultured and given fluid resuscitation and vancomycin and cefepime.  He was admitted to the intensive care unit. He was recently given cefpodoxime to take prophylactically associated with his chemotherapy for recurring urinary tract infections.  Most recent positive urine culture was September 18th with E coli sensitive to 3rd generation cephalosporins carbapenems  He has had a hectic fever, stool for C difficile toxin was obtained and was resulted as positive today.  he has had a positive C difficile test before, August 2019.  White blood cells remain depressed at 0.1, platelets are depressed at 91,000, creatinine 1.6.  CT scan of the abdomen obtained last night shows severe proctocolitis without megacolon. Neupogen has been ordered. He is discouraged from eating by severe cramps.      10/17/2020 tmax is improved. Continues to be neutropenic Continues to have cramping, intermittent, abdominal pain. + diarrhea "lost count how many" He had refused vancomycin in am but decided to take it now  10/18/2020 afebrile,(103 on 16th)  ANC improved on granix 0---1029) he is feeling much better today.  Less abdominal pain.  Less loose stools.  10/19/2020.  Afebrile.  T-max 99°.  Less need for pressors.  WBC 5.  Creatinine slightly worse at 1.9.  Discussed with nurse.  Patient may have had some hallucinations earlier  10/20/2020.  Afebrile. Less abdominal pain. Less diarrhea. He feels his abdomen is still distended and does not allow him to take a deep breath. KUB  Is read " as below, I feel that transvere colon is a little bigger today. Creatinine has worsened. Bicarb is 9. Nephrologist is giving bicarb drip.  Las Vancomycin iv and cefepime were  given on 18   Tolerating vancomycin by mouth and metronidazole    10/21: interim reviewed d/w Dr. Jiménez. Requiring bicarb drip. WBC 14 after neupogen, platelets better. Cr stable. Severe hypoalbuminemia. KUB not ominous, CXR with blunted left CPA. Wife reports he is sleeping all of the time and not eating. I aroused him and he agreed to eat some pudding  10/22: interim reviewed. Renal function slightly worse, bicarb corrected to 18 on drip. Urine output is poor and incontinent, midodrine started. Not eating.   10/23:  Interim reviewed, afebrile, oxygenating normally, off norepinephrine since administration of my do drain.  Stools becoming more solid, 2 measured since 0 700, still having incontinence.  Able to do some physical therapy exercises but physical therapy recommends LTAC/rehab/SNF.  Urine output is good, creatinine has stabilized 2.8, white blood cells normal.  CO2 still low at 15.  No new imaging. Slept during visit, discussed with RN and wife.     Antibiotics (From admission, onward)    Start     Stop Route Frequency Ordered    10/17/20 0000  vancomycin 25 mg/mL oral solution 500 mg  (C. difficile Infection (CDI) Treatment Order Panel)      10/26 0559 Oral Every 6 hours 10/16/20 1755    10/16/20 1900  metronidazole IVPB 500 mg      -- IV Every 8 hours (non-standard times) 10/16/20 1755             EXAM & DIAGNOSTICS REVIEWED:   Vitals:     Temp:  [98.1 °F (36.7 °C)-98.5 °F (36.9 °C)]   Temp: 98.1 °F (36.7 °C) (10/23/20 1200)  Pulse: 78 (10/23/20 1530)  Resp: (!) 21 (10/23/20 1530)  BP: 105/73 (10/23/20 1530)  SpO2: 98 % (10/23/20 1530)    Intake/Output Summary (Last 24 hours) at 10/23/2020 1605  Last data filed at 10/23/2020 1300  Gross per 24 hour   Intake 2309.18 ml   Output 1600 ml   Net 709.18 ml       General:  In  NAD.sleeping  Eyes:     ENT:     Neck:  Supple   Lungs: Clear, no consolidation, rales, wheezes, rub  Heart:  RRR, no gallop/murmur/rub noted  Abd:  Mild distention, active BS, no masses or organomegaly appreciated.    :  Voids/  Incontinent,  no flank tenderness  Musc:  Joints without effusion, swelling, erythema, synovitis,    Skin:  No rashes.    Wound:   Neuro: sleeping.    Psych:         Extrem: generalized edema, no erythema, phlebitis, cellulitis, warm and well perfused  VAD:  portacath     Isolation:  Special contact    Lines/Tubes/Drains:    General Labs reviewed:  Recent Labs   Lab 10/21/20  0354 10/22/20  0411 10/23/20  0344   WBC 14.81* 12.13 9.40   HGB 10.1* 10.3* 9.4*   HCT 30.0* 30.5* 28.1*   PLT 76* 115* 134*       Recent Labs   Lab 10/20/20  2352 10/21/20  0354 10/22/20  0411 10/23/20  0344   * 132* 135* 133*   K 4.3 4.1 4.2 3.9   * 111* 111* 110   CO2 12* 11* 18* 15*   BUN 37* 35* 34* 37*   CREATININE 2.5* 2.6* 2.8* 2.8*   CALCIUM 7.9* 7.9* 7.8* 7.6*   PROT 4.2*  --   --   --    BILITOT 0.5  --   --   --    ALKPHOS 154*  --   --   --    ALT 18  --   --   --    AST 21  --   --   --      Micro:  Microbiology Results (last 7 days)     Procedure Component Value Units Date/Time    Urine Culture High Risk [[id number]] Collected: 10/20/20 0022    Order Status: Completed Specimen: Urine, Clean Catch Updated: 10/21/20 1928     Urine Culture, Routine No growth    Narrative:      Indicated criteria for high risk culture:->Other  Other (specify):->Neutropenic patient    Blood culture x two cultures. Draw prior to antibiotics. [[id number]] Collected: 10/15/20 1626    Order Status: Completed Specimen: Blood from Peripheral, Right Hand Updated: 10/21/20 0612     Blood Culture, Routine No growth after 5 days.    Narrative:      Aerobic and anaerobic    Blood culture x two cultures. Draw prior to antibiotics. [[id number]] Collected: 10/15/20 1546    Order Status: Completed Specimen: Blood from  Antecubital, Right Arm Updated: 10/21/20 0612     Blood Culture, Routine No growth after 5 days.    Narrative:      Aerobic and anaerobic        Imaging Reviewed:   KUBThree round radiodensities overlie the left upper quadrant may represent ingested pills or be in the patient's clothing.  A radiodense thread is noted over the right lower quadrant of uncertain significance.   CXR clear   CT abdomen and pelvis 10/16 :    Findings consistent with severe proctocolitis.    Transplanted kidney right side of the pelvis with mild pelvocaliectasis and perinephric stranding nonspecific.  No calcified stones seen Additional findings as detailed above including cystic lesion with in the native right kidney versus dilatation of collecting structure of the native right kidney.  Stones in the native right kidney.  Cystic lesion within the pancreas  Cardiology:    IMPRESSION & PLAN   1. Severe C. Difficile colitis, improved   Prompted by oral antibiotics and/or chemotherapy   History of Cdiff 8/2019   Metabolic acidosis, requiring IV bicarb    2. PTLD, EBV related lymphoma, receiving chemotherapy      neutropenia, resolved  3. S/p renal transplant on immunosuppression, [facility], Cr 2.8  4. Chronic diarrhea  5. History of recurrent UTIs      Recommendations:     IV flagyl and reducing dose of oral vancomyin to 125 mg Q 6 for Cdiff    cholestyramine and probiotic   Needs aggressive nutritional support    out of bed as able  ? Move out of ICU  Would qualify for LTAC

## 2020-10-23 NOTE — PLAN OF CARE
Shift goals discussed with patient with time given for questions and answers. Rests fair, stools becoming more solid, remains incontinent of BM's. Denies pain this shift.

## 2020-10-23 NOTE — PROGRESS NOTES
" INPATIENT NEPHROLOGY PROGRESS  [facility] NEPHROLOGY    Alin Burkett  10/23/2020    Reason for consultation:    Acute kidney injury     Chief Complaint:   Chief Complaint   Patient presents with    Diarrhea     for the past 3 days,last received chemotherapy 6 days ago.     Abdominal Pain          History of Present Illness:    Per H and P    Patient has been having diarrhea with "jelly-like" consistency as well as crampiness in the abdomen.  Symptoms began roughly one week ago.  Also has had fever and malaise.  Appetite poor.  Fatigue as well.  He has been receiving chemo for PTLD; most recent cycle of CHOP was end of last week.  He has history of renal transplant four years ago and is currently on twice-a-day  Prograf.  He's had no cough, no unusual headache, no sinus pain, and no burning on urination.     10/20  Has some diarrhea and abdominal pain.  No nausea, chest pain, sob, new neuro symptoms, new joint pain.  He is very weak.    I told him that he had previously been seen by doctor Merchant and I would call him to see him.  He stated he didn't want to see Dr Merchant and requested that I become his nephrologist.    10/21  Not much change in renal function since yesterday. UOP 1.3L.  Sleeping quietly.  10/23  Sleeping.  1550 urine output.      Plan of Care:       Assessment:    Acute kidney injury likely hemodynamically mediated  --urine sodium low implicating renal hypoperfusion   --Avoid NSAIDS, Sales II inhibitors, and other non-essential nephrotoxic agents  --renal dose medication for crcl 10-50  --keep map above 55       nongap metabolic acidosis likely combination of GI losses and renal tubular defect (urine pH inappropriately high)  - urine anion gap not accurate due to low urine sodium       S/p renal transplant  --tacrolimus level therapeutic    Hyponatremia  --better  --avoid hypotonic iv piggy backs  --no ssri antidepressants or thiazide diuretics    I tried calling patient's wife.  No answer   "                 Thank you for allowing us to participate in this patient's care. We will continue to follow.    Vital Signs:  Temp Readings from Last 3 Encounters:   10/23/20 98.3 °F (36.8 °C) (Skin)   10/10/20 98.3 °F (36.8 °C) (Oral)   10/09/20 98.6 °F (37 °C)       Pulse Readings from Last 3 Encounters:   10/23/20 87   10/10/20 95   10/09/20 (!) 56       BP Readings from Last 3 Encounters:   10/23/20 99/66   10/10/20 102/63   10/09/20 115/76       Weight:  Wt Readings from Last 3 Encounters:   10/22/20 78.7 kg (173 lb 8 oz)   10/10/20 68.5 kg (151 lb)   10/09/20 66.4 kg (146 lb 6.2 oz)       Past Medical & Surgical History:  Past Medical History:   Diagnosis Date    Acidosis     Adrenal adenoma     Anemia associated with chronic renal failure     Arrhythmia, onset 2015    Awaiting organ transplant status 2013    Basal cell carcinoma 2012    left nasal tip    Blood type B+ 2013    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  Renal allograft biopsy 17 (DIV[facility]): 21 glomeruli, none globally sclerosed, <5% interstitial fibrosis, no ACR, c4d negative, AVR CCT Type 2 (V1 lesion); plan THYMO     Dissecting aortic aneurysm (any part), abdominal     Diverticulosis     Encounter for blood transfusion     ESRD (end stage renal disease) 2010    H/O urethral stricture 2018    H/O: urethral stricture     History of AAA (abdominal aortic aneurysm) repair     History of urethral stricture 2018    Hypertension     Hypokalemia     Hypothyroidism 1/10/2014    Inguinal hernia bilateral, non-recurrent     Kidney stones     Organ transplant candidate 2013    Plantar warts 1/10/2014    Recurrent UTI  7/28/2017    S/P kidney transplant     Secondary hyperparathyroidism, renal     Thyroid disease        Past Surgical History:   Procedure Laterality Date    ABDOMINAL SURGERY      exploratory lapatomy x 2    ABLATION N/A 8/22/2019    Procedure: ABLATION, SVT;  Surgeon: Emerson Mcmanus MD;  Location: [facility] EP LAB;  Service: Cardiology;  Laterality: N/A;  SVT, RFA, CARTO, anes, GP, 323    AORTA - SUPERIOR MESENTERIC ARTERY BYPASS GRAFT      BLADDER NECK RECONSTRUCTION      BLADDER SURGERY      COLONOSCOPY  10/10/2013    Dr. Gutierrez, repeat in 5 years    CYSTOSCOPY      CYSTOSCOPY N/A 12/19/2018    Procedure: CYSTOSCOPY;  Surgeon: Dweey Mann MD;  Location: [facility] OR [address] [place] [address];  Service: Urology;  Laterality: N/A;  45 min    CYSTOURETHROSCOPY WITH DIRECT VISION INTERNAL URETHROTOMY N/A 12/19/2018    Procedure: CYSTOSCOPY, WITH DIRECT VISION INTERNAL URETHROTOMY;  Surgeon: Dewey Mann MD;  Location: [address];  Service: Urology;  Laterality: N/A;    DILATION OF URETHRA N/A 12/19/2018    Procedure: DILATION, URETHRA;  Surgeon: Dewey Mann MD;  Location: [address];  Service: Urology;  Laterality: N/A;    EXCISIONAL BIOPSY N/A 7/13/2020    Procedure: EXCISIONAL BIOPSY- LEFT INGUINAL NODE;  Surgeon: Vlad Epstein MD;  Location: [address];  Service: General;  Laterality: N/A;    FLEXIBLE CYSTOSCOPY N/A 11/6/2019    Procedure: CYSTOSCOPY, FLEXIBLE;  Surgeon: Dewey Mann MD;  Location: [facility] OR [address] [place] [address];  Service: Urology;  Laterality: N/A;    GASTROJEJUNOSTOMY      HEMORRHOID SURGERY      HERNIA REPAIR      INSERTION OF VENOUS ACCESS PORT Left 7/27/2020    Procedure: INSERTION, VENOUS ACCESS PORT;  Surgeon: Vlad Epstein MD;  Location: [facility] OR [address] [place] [address];  Service: General;  Laterality: Left;    KIDNEY TRANSPLANT      LEFT HEART CATHETERIZATION Left 8/20/2019    Procedure: Left heart cath;  Surgeon: Javan Oscar MD;  Location: [facility] CATH/EP LAB;  Service:  Cardiology;  Laterality: Left;    LITHOTRIPSY      LYMPH NODE BIOPSY N/A 6/30/2020    Procedure: BIOPSY, LYMPH NODE;  Surgeon: Ella Diagnostic Provider;  Location: [facility] OR [address] [place] [address];  Service: General;  Laterality: N/A;  189 lymph node biopsy /ULTRASOUND    PERCUTANEOUS NEPHROLITHOTRIPSY      right  ESWL  10/31/12    right ESWL  6/26/12    URETHROPLASTY USING PATCH GRAFT N/A 11/6/2019    Procedure: URETHROPLASTY, USING PATCH GRAFT BUCCAL MUCOSA GRAFT;  Surgeon: Dewey Mann MD;  Location: [facility] OR [address] [place] [address];  Service: Urology;  Laterality: N/A;  3 HOURS       Past Social History:  Social History     Socioeconomic History    Marital status: Single     Spouse name: Not on file    Number of children: Not on file    Years of education: Not on file    Highest education level: Not on file   Occupational History     Employer: Disabled   Social Needs    Financial resource strain: Not on file    Food insecurity     Worry: Not on file     Inability: Not on file    Transportation needs     Medical: Not on file     Non-medical: Not on file   Tobacco Use    Smoking status: Former Smoker     Packs/day: 0.50     Years: 40.00     Pack years: 20.00     Types: Cigarettes     Quit date: 6/16/2010     Years since quitting: 10.3    Smokeless tobacco: Never Used   Substance and Sexual Activity    Alcohol use: Yes     Alcohol/week: 3.0 standard drinks     Types: 3 Cans of beer per week     Comment: occasional/social    Drug use: No     Comment: THC in youth    Sexual activity: Yes     Partners: Female     Birth control/protection: None   Lifestyle    Physical activity     Days per week: Not on file     Minutes per session: Not on file    Stress: Not on file   Relationships    Social connections     Talks on phone: Not on file     Gets together: Not on file     Attends Christian service: Not on file     Active member of club or organization: Not on file     Attends meetings of clubs or organizations: Not on file      Relationship status: Not on file   Other Topics Concern    Not on file   Social History Narrative    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      Current Outpatient Medications on File Prior to Encounter   Medication Sig Dispense Refill    allopurinoL (ZYLOPRIM) 300 MG tablet Take 1 tablet (300 mg total) by mouth once daily. 30 tablet 2    aspirin (ECOTRIN) 81 MG EC tablet Take 1 tablet (81 mg total) by mouth once daily.  0    calcitRIOL (ROCALTROL) 0.5 MCG Cap Take 1 capsule (0.5 mcg total) by mouth once daily. 30 capsule 11    cefpodoxime (VANTIN) 100 MG tablet Take 1 tablet (100 mg total) by mouth every 12 (twelve) hours. 60 tablet 3    COMBIGAN 0.2-0.5 % Drop Place 1 drop into both eyes 2 (two) times a day.       famotidine (PEPCID) 20 MG tablet TAKE 1 TABLET EVERY EVENING (Patient taking differently: Take 20 mg by mouth every evening. ) 90 tablet 3    ketoconazole (NIZORAL) 200 mg Tab TAKE ONE-HALF (1/2) TABLET ONCE DAILY (Patient taking differently: Take 100 mg by mouth once daily. ) 45 tablet 3    levothyroxine (SYNTHROID) 100 MCG tablet TAKE 1 TABLET DAILY (Patient taking differently: Take 100 mcg by mouth before breakfast. Administer on an empty stomach at least 30 minutes before food. If receiving tube feeds, HOLD tube feeds for 1 hour before and after levothyroxine administration.) 90 tablet 3    magnesium oxide (MAG-OX) 400 mg (241.3 mg magnesium) tablet Take 1 tablet (400 mg total) by mouth once daily. 90 tablet 3    multivitamin (ONE DAILY MULTIVITAMIN) per tablet Take 1 tablet by mouth once daily.      ondansetron (ZOFRAN-ODT) 8 MG TbDL Dissolve 1 tablet (8 mg total) by mouth every 12 (twelve) hours as needed. (Patient taking differently: Take 8 mg by mouth every 12 (twelve) hours as needed (nausea). ) 21 tablet 1    predniSONE (DELTASONE) 50 MG Tab Take 2 tablets (100 mg total) by mouth  once daily. Take on days 2-5 of your chemotherapy cycles. 8 tablet 0    sodium bicarbonate 650 MG tablet Take 1 tablet (650 mg total) by mouth 2 (two) times daily. 540 tablet 3    tacrolimus (PROGRAF) 1 MG Cap Take 3 capsules (3 mg total) by mouth every morning AND 2 capsules (2 mg total) every evening. Z94.0/Kidney Transplant on 11/26/16. 150 capsule 11    travoprost (TRAVATAN Z) 0.004 % ophthalmic solution Place 1 drop into both eyes every evening.        Scheduled Meds:   allopurinoL  100 mg Oral Daily    aspirin  81 mg Oral Daily    brimonidine-timoloL  1 drop Both Eyes Q12H    calcitRIOL  0.5 mcg Oral Daily    cholestyramine-aspartame  1 packet Oral BID    dicyclomine  10 mg Oral QID    dronabinoL  5 mg Oral BID    famotidine  20 mg Oral QHS    fat emulsion 20%  250 mL Intravenous Daily    ferrous sulfate  325 mg Oral BID    Lactobacillus acidoph-L.bulgar  4 tablet Oral TID WM    levothyroxine  100 mcg Oral Before breakfast    metronidazole  500 mg Intravenous Q8H    midodrine  10 mg Oral TID    tacrolimus  2 mg Oral Daily PM    tacrolimus  3 mg Oral Daily AM    travoprost  1 drop Both Eyes QHS    vancomycin  500 mg Oral Q6H     Continuous Infusions:   Amino acid 4.25% - dextrose 5% (CLINIMIX-E) solution with additives (1L provides 42.5 gm AA, 50 gm CHO (170 kcal/L dextrose), Na 35, K 30, Mg 5, Ca 4.5, Acetate 70, Cl 39, Phos 15) 85 mL/hr at 10/22/20 1737    Amino acid 4.25% - dextrose 5% (CLINIMIX-E) solution with additives (1L provides 42.5 gm AA, 50 gm CHO (170 kcal/L dextrose), Na 35, K 30, Mg 5, Ca 4.5, Acetate 70, Cl 39, Phos 15)      norepinephrine bitartrate-D5W Stopped (10/22/20 2000)     PRN Meds:.acetaminophen, albuterol-ipratropium, hyoscyamine, magnesium sulfate IVPB, magnesium sulfate IVPB, metoclopramide HCl, morphine, ondansetron, potassium chloride in water **AND** potassium chloride in water **AND** potassium chloride in water, promethazine, sodium chloride  "0.9%    Allergies:  Patient has no known allergies.    Past Family History:  Reviewed; refer to Hospitalist Admission Note    Review of Systems:  Review of Systems - All 14 systems reviewed and negative, except as noted in HPI    Physical Exam:    BP 99/66   Pulse 87   Temp 98.3 °F (36.8 °C) (Skin)   Resp 19   Ht 5' 11" (1.803 m)   Wt 78.7 kg (173 lb 8 oz)   SpO2 98%   BMI 24.20 kg/m²     General Appearance:    Alert, cooperative, no distress, appears stated age   Head:    Normocephalic, without obvious abnormality, atraumatic   Eyes:    PER, conjunctiva/corneas clear, EOM's intact in both eyes        Throat:   Lips, mucosa, and tongue normal; teeth and gums normal   Back:     Symmetric, no curvature, ROM normal, no CVA tenderness   Lungs:     Clear to auscultation bilaterally, respirations unlabored   Chest wall:    No tenderness or deformity   Heart:    Regular rate and rhythm, S1 and S2 normal, no murmur, rub   or gallop   Abdomen:     Tender to palpation   Extremities:   Extremities normal, atraumatic, no cyanosis or edema   Pulses:   2+ and symmetric all extremities   MSK:   No joint or muscle swelling, tenderness or deformity   Skin:   Skin color, texture, turgor normal, no rashes or lesions   Neurologic:   CNII-XII intact, normal strength and sensation       Throughout.  No flap     Results:  Lab Results   Component Value Date     (L) 10/23/2020    K 3.9 10/23/2020     10/23/2020    CO2 15 (L) 10/23/2020    BUN 37 (H) 10/23/2020    CREATININE 2.8 (H) 10/23/2020    CALCIUM 7.6 (L) 10/23/2020    ANIONGAP 8 10/23/2020    ESTGFRAFRICA 25 (A) 10/23/2020    EGFRNONAA 22 (A) 10/23/2020       Lab Results   Component Value Date    CALCIUM 7.6 (L) 10/23/2020    PHOS 2.8 10/21/2020       Recent Labs   Lab 10/23/20  0344   WBC 9.40   RBC 2.97*   HGB 9.4*   HCT 28.1*   *   MCV 95   MCH 31.6*   MCHC 33.5          I have personally reviewed pertinent radiological imaging and reports.       "

## 2020-10-23 NOTE — PLAN OF CARE
POC for OOB activity as tolerated. Monitoring of BP continues along with IV nutritional support. Pt eating better today. Less frequent stooling. All safety maintained.

## 2020-10-23 NOTE — PT/OT/SLP EVAL
"Occupational Therapy   Evaluation    Name: Alin Burkett  MRN: [id number]  Admitting Diagnosis:  Septic shock      Recommendations:     Discharge Recommendations: nursing facility, skilled  Discharge Equipment Recommendations:  TBD  Barriers to discharge:  Decreased caregiver support    Assessment:     Alin Burkett is a 70 y.o. male with a medical diagnosis of Septic shock.  He presents with performance deficits affecting function: weakness, impaired endurance, impaired self care skills, impaired functional mobilty, gait instability and impaired balance.      Rehab Prognosis: Fair; patient would benefit from acute skilled OT services to address these deficits and reach maximum level of function.       Plan:     Patient to be seen 5 x/week to address the above listed problems via self-care/home management, therapeutic activities, therapeutic exercises  · Plan of Care Expires: 11/06/20  · Plan of Care Reviewed with: patient    Subjective     Chief Complaint: Weakness  Patient/Family Comments/goals: " To get stronger. "    Occupational Profile:  Living Environment: Pt lives with his wife in a house.  Previous level of function: I with ADLs  Equipment Used at Home:  shower chair  Assistance upon Discharge: Pt will have assistance from his wife at discharge.    Pain/Comfort:  Pain Rating 1: 0/10       Objective:     Communicated with: nurse prior to session.  Patient found up in chair with peripheral IV, telemetry, blood pressure cuff, pulse ox (continuous) upon OT entry to room.    General Precautions: Standard, fall, special contact   Orthopedic Precautions: N/A  Braces: N/A    Occupational Performance:    Functional Mobility/Transfers:  · Patient completed Sit <> Stand Transfer with maximal assistance x 2 with rolling walker     Activities of Daily Living:  · Feeding:  stand by assistance  · Grooming: minimum assistance  · Upper Body Dressing: maximal assistance  · Lower Body Dressing: maximal " assistance  · Toileting: maximal assistance    Cognitive/Visual Perceptual:  Cognitive/Psychosocial Skills:  -       Oriented to: Person, Place, Time and Situation   -       Follows Commands/attention:Follows one-step commands    Physical Exam:  Balance:    Upper Extremity Range of Motion:  -       Right Upper Extremity: WFL  -       Left Upper Extremity: WFL  Upper Extremity Strength: -       Right Upper Extremity: WFL  -       Left Upper Extremity: WFL    AMPAC 6 Click ADL:  AMPAC Total Score: 15    Treatment & Education:  Pt was given education on role of OT, POC and calling for assistance for OOB mobility. Pt verbalized understanding.   Education:    Patient left up in chair with all lines intact, call button in reach and wife present    GOALS:   Multidisciplinary Problems     Occupational Therapy Goals        Problem: Occupational Therapy Goal    Goal Priority Disciplines Outcome Interventions   Occupational Therapy Goal     OT, PT/OT Ongoing, Progressing    Description: Goals to be met by: 2020    Patient will increase functional independence with ADLs by performing:    UE Dressing with Set-up Assistance.  LE Dressing with Minimal Assistance.  Grooming while seated with Set-up Assistance.  Toileting from toilet with Minimal Assistance for hygiene and clothing management.   Sitting at edge of bed x 15 minutes with Supervision.  Toilet transfer to toilet with Minimal Assistance.                     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  Renal allograft biopsy 1/6/17 ([facility]): 21 glomeruli, none globally sclerosed, <5% interstitial fibrosis, no ACR, c4d negative, AVR CCT Type 2 (V1 lesion); plan THYMO     Dissecting aortic aneurysm (any part), abdominal     Diverticulosis     Encounter for blood transfusion     ESRD (end stage renal disease) 06/16/2010    H/O urethral stricture 11/27/2018    H/O: urethral stricture     History of AAA (abdominal aortic aneurysm) repair     History of urethral stricture 12/19/2018    Hypertension     Hypokalemia     Hypothyroidism 1/10/2014    Inguinal hernia bilateral, non-recurrent     Kidney stones     Organ transplant candidate 11/26/2013    Plantar warts 1/10/2014    Recurrent UTI 7/28/2017    S/P kidney transplant     Secondary hyperparathyroidism, renal     Thyroid disease          Past Surgical History:   Procedure Laterality Date    ABDOMINAL SURGERY      exploratory lapatomy x 2    ABLATION N/A 8/22/2019    Procedure: ABLATION, SVT;  Surgeon: Emerson Mcmanus MD;  Location: [facility] EP LAB;  Service: Cardiology;  Laterality: N/A;  SVT, RFA, CARTO, anes, GP, 323    AORTA - SUPERIOR MESENTERIC ARTERY BYPASS GRAFT      BLADDER NECK RECONSTRUCTION      BLADDER SURGERY      COLONOSCOPY  10/10/2013    Dr. Gutierrez, repeat in 5 years    CYSTOSCOPY      CYSTOSCOPY N/A 12/19/2018    Procedure: CYSTOSCOPY;  Surgeon: Dewey Mann MD;  Location: [facility] OR [address] [place] [address];  Service: Urology;  Laterality: N/A;  45 min    CYSTOURETHROSCOPY WITH DIRECT VISION INTERNAL URETHROTOMY N/A 12/19/2018    Procedure: CYSTOSCOPY, WITH DIRECT VISION INTERNAL URETHROTOMY;  Surgeon: Dewey Mann MD;  Location: [facility] OR [address] [place] [address];  Service: Urology;  Laterality: N/A;    DILATION OF URETHRA N/A 12/19/2018    Procedure: DILATION, URETHRA;  Surgeon: Dewey Mann MD;  Location: [facility] OR [address] [place] [address];  Service: Urology;  Laterality: N/A;     EXCISIONAL BIOPSY N/A 7/13/2020    Procedure: EXCISIONAL BIOPSY- LEFT INGUINAL NODE;  Surgeon: Vlad Epstein MD;  [address];  Service: General;  Laterality: N/A;    FLEXIBLE CYSTOSCOPY N/A 11/6/2019    Procedure: CYSTOSCOPY, FLEXIBLE;  Surgeon: Dewey Mann MD;  Location: [address];  Service: Urology;  Laterality: N/A;    GASTROJEJUNOSTOMY      HEMORRHOID SURGERY      HERNIA REPAIR      INSERTION OF VENOUS ACCESS PORT Left 7/27/2020    Procedure: INSERTION, VENOUS ACCESS PORT;  Surgeon: Vlad Epstein MD;  Location: [address];  Service: General;  Laterality: Left;    KIDNEY TRANSPLANT      LEFT HEART CATHETERIZATION Left 8/20/2019    Procedure: Left heart cath;  Surgeon: Javan Oscar MD;  Location: [facility] CATH/EP LAB;  Service: Cardiology;  Laterality: Left;    LITHOTRIPSY      LYMPH NODE BIOPSY N/A 6/30/2020    Procedure: BIOPSY, LYMPH NODE;  Surgeon: [facility] Diagnostic Provider;  Location: [address];  Service: General;  Laterality: N/A;  189 lymph node biopsy /ULTRASOUND    PERCUTANEOUS NEPHROLITHOTRIPSY      right  ESWL  10/31/12    right ESWL  6/26/12    URETHROPLASTY USING PATCH GRAFT N/A 11/6/2019    Procedure: URETHROPLASTY, USING PATCH GRAFT BUCCAL MUCOSA GRAFT;  Surgeon: Dewey Mann MD;  Location: [address];  Service: Urology;  Laterality: N/A;  3 HOURS       Time Tracking:     OT Date of Treatment: 10/23/20  OT Start Time: 1345  OT Stop Time: 1406  OT Total Time (min): 21 min    Billable Minutes:Evaluation 21    Reema Headley, OTR  10/23/2020

## 2020-10-23 NOTE — PLAN OF CARE
Problem: Occupational Therapy Goal  Goal: Occupational Therapy Goal  Description: Goals to be met by: 11/6/2020    Patient will increase functional independence with ADLs by performing:    UE Dressing with Set-up Assistance.  LE Dressing with Minimal Assistance.  Grooming while seated with Set-up Assistance.  Toileting from toilet with Minimal Assistance for hygiene and clothing management.   Sitting at edge of bed x 15 minutes with Supervision.  Toilet transfer to toilet with Minimal Assistance.    Outcome: Ongoing, Progressing

## 2020-10-23 NOTE — PLAN OF CARE
Problem: Physical Therapy Goal  Goal: Physical Therapy Goal  Description: Goals to be met by: 2020     Patient will increase functional independence with mobility by performin. Supine to sit with MInimal Assistance  2. Sit to stand transfer with Minimal Assistance  3. Bed to chair transfer with Minimal Assistance using Rolling Walker  4. Gait  x 250 feet with Minimal Assistance using Rolling Walker.   5. Lower extremity exercise program x20 reps     Outcome: Ongoing, Progressing   Pt seen for LE's thera ex in supine and EOB. Standing with RW assist x2 for safety. Pt with LE's weakness with giveaway- high fall risk  LTAC/rehab/SNF?

## 2020-10-24 PROBLEM — A41.9 SEPSIS: Status: ACTIVE | Noted: 2020-01-01

## 2020-10-24 PROBLEM — R65.20 SEVERE SEPSIS: Status: ACTIVE | Noted: 2020-01-01

## 2020-10-24 NOTE — SUBJECTIVE & OBJECTIVE
Interval History:  Creatinine stabilizing this point.  Patient's mental status improved    Review of Systems   Constitutional: Positive for fatigue. Negative for appetite change, chills and fever.   HENT: Negative for congestion, hearing loss, rhinorrhea, sore throat, trouble swallowing and voice change.    Respiratory: Negative for cough, chest tightness, shortness of breath and wheezing.    Cardiovascular: Negative for chest pain, palpitations and leg swelling.   Gastrointestinal: Positive for abdominal pain and diarrhea. Negative for blood in stool, nausea and vomiting.   Genitourinary: Negative for difficulty urinating, frequency, hematuria and urgency.   Musculoskeletal: Negative for back pain, joint swelling and neck stiffness.   Skin: Negative for pallor and rash.   Neurological: Negative for tremors, seizures, syncope, speech difficulty, weakness, numbness and headaches.   Hematological: Negative for adenopathy.   Psychiatric/Behavioral: Negative for agitation, behavioral problems, confusion and sleep disturbance.     Objective:     Vital Signs (Most Recent):  Temp: 98.9 °F (37.2 °C) (10/24/20 0730)  Pulse: 101 (10/24/20 0900)  Resp: (!) 25 (10/24/20 0900)  BP: 125/81 (10/24/20 0900)  SpO2: 99 % (10/24/20 0900) Vital Signs (24h Range):  Temp:  [98 °F (36.7 °C)-98.9 °F (37.2 °C)] 98.9 °F (37.2 °C)  Pulse:  [] 101  Resp:  [17-40] 25  SpO2:  [71 %-100 %] 99 %  BP: ()/(59-91) 125/81     Weight: 83.5 kg (184 lb 1.4 oz)  Body mass index is 25.67 kg/m².    Intake/Output Summary (Last 24 hours) at 10/24/2020 1027  Last data filed at 10/24/2020 0745  Gross per 24 hour   Intake 2986.4 ml   Output 1725 ml   Net 1261.4 ml      Physical Exam  Vitals signs and nursing note reviewed.   Constitutional:       General: He is not in acute distress.     Appearance: He is ill-appearing. He is not diaphoretic.   HENT:      Head: Normocephalic and atraumatic.      Mouth/Throat:      Mouth: Mucous membranes are dry.    Eyes:      General: No scleral icterus.        Right eye: No discharge.         Left eye: No discharge.      Pupils: Pupils are equal, round, and reactive to light.   Neck:      Vascular: No JVD.   Cardiovascular:      Rate and Rhythm: Normal rate and regular rhythm.   Pulmonary:      Effort: Pulmonary effort is normal.      Breath sounds: Normal breath sounds.   Abdominal:      General: Bowel sounds are normal. There is no distension.      Palpations: Abdomen is soft.      Tenderness: There is abdominal tenderness in the left upper quadrant and left lower quadrant. There is no rebound.   Musculoskeletal:      Right lower leg: No edema.      Left lower leg: No edema.   Skin:     General: Skin is warm.      Capillary Refill: Capillary refill takes less than 2 seconds.      Findings: No rash.   Neurological:      General: No focal deficit present.      Mental Status: He is alert.      Cranial Nerves: No cranial nerve deficit.   Psychiatric:         Mood and Affect: Mood normal.         Significant Labs: All pertinent labs within the past 24 hours have been reviewed.    Significant Imaging: I have reviewed all pertinent imaging results/findings within the past 24 hours.

## 2020-10-24 NOTE — PROGRESS NOTES
"[facility] Medicine  Progress Note    Patient Name: Alin Burkett  MRN: [id number]  Patient Class: IP- Inpatient   Admission Date: 10/15/2020  Length of Stay: 9 days  Attending Physician: Julia Summers MD  Primary Care Provider: Carmen Krueger MD        Subjective:     Principal Problem:Septic shock        HPI:  Patient has been having diarrhea with "jelly-like" consistency as well as crampiness in the abdomen.  Symptoms began roughly one week ago.  Also has had fever and malaise.  Appetite poor.  Fatigue as well.  He has been receiving chemo for PTLD; most recent cycle of CHOP was end of last week.  He has history of renal transplant four years ago and is currently on twice-a-day  Prograf.  He's had no cough, no unusual headache, no sinus pain, and no burning on urination.  He feels that he's probably dehydrated.    Overview/Hospital Course:  No notes on file    Interval History:  Creatinine stabilizing this point.  Patient's mental status improved    Review of Systems   Constitutional: Positive for fatigue. Negative for appetite change, chills and fever.   HENT: Negative for congestion, hearing loss, rhinorrhea, sore throat, trouble swallowing and voice change.    Respiratory: Negative for cough, chest tightness, shortness of breath and wheezing.    Cardiovascular: Negative for chest pain, palpitations and leg swelling.   Gastrointestinal: Positive for abdominal pain and diarrhea. Negative for blood in stool, nausea and vomiting.   Genitourinary: Negative for difficulty urinating, frequency, hematuria and urgency.   Musculoskeletal: Negative for back pain, joint swelling and neck stiffness.   Skin: Negative for pallor and rash.   Neurological: Negative for tremors, seizures, syncope, speech difficulty, weakness, numbness and headaches.   Hematological: Negative for adenopathy.   Psychiatric/Behavioral: Negative for agitation, behavioral problems, confusion and sleep disturbance. "     Objective:     Vital Signs (Most Recent):  Temp: 98.9 °F (37.2 °C) (10/24/20 0730)  Pulse: 101 (10/24/20 0900)  Resp: (!) 25 (10/24/20 0900)  BP: 125/81 (10/24/20 0900)  SpO2: 99 % (10/24/20 0900) Vital Signs (24h Range):  Temp:  [98 °F (36.7 °C)-98.9 °F (37.2 °C)] 98.9 °F (37.2 °C)  Pulse:  [] 101  Resp:  [17-40] 25  SpO2:  [71 %-100 %] 99 %  BP: ()/(59-91) 125/81     Weight: 83.5 kg (184 lb 1.4 oz)  Body mass index is 25.67 kg/m².    Intake/Output Summary (Last 24 hours) at 10/24/2020 1027  Last data filed at 10/24/2020 0745  Gross per 24 hour   Intake 2986.4 ml   Output 1725 ml   Net 1261.4 ml      Physical Exam  Vitals signs and nursing note reviewed.   Constitutional:       General: He is not in acute distress.     Appearance: He is ill-appearing. He is not diaphoretic.   HENT:      Head: Normocephalic and atraumatic.      Mouth/Throat:      Mouth: Mucous membranes are dry.   Eyes:      General: No scleral icterus.        Right eye: No discharge.         Left eye: No discharge.      Pupils: Pupils are equal, round, and reactive to light.   Neck:      Vascular: No JVD.   Cardiovascular:      Rate and Rhythm: Normal rate and regular rhythm.   Pulmonary:      Effort: Pulmonary effort is normal.      Breath sounds: Normal breath sounds.   Abdominal:      General: Bowel sounds are normal. There is no distension.      Palpations: Abdomen is soft.      Tenderness: There is abdominal tenderness in the left upper quadrant and left lower quadrant. There is no rebound.   Musculoskeletal:      Right lower leg: No edema.      Left lower leg: No edema.   Skin:     General: Skin is warm.      Capillary Refill: Capillary refill takes less than 2 seconds.      Findings: No rash.   Neurological:      General: No focal deficit present.      Mental Status: He is alert.      Cranial Nerves: No cranial nerve deficit.   Psychiatric:         Mood and Affect: Mood normal.         Significant Labs: All pertinent labs  within the past 24 hours have been reviewed.    Significant Imaging: I have reviewed all pertinent imaging results/findings within the past 24 hours.      Assessment/Plan:      * Septic shock  Source is likely colitis from C.difficile.  Map appears to be above 70.  Will titrate down the Levophed  Empiric antibiotcs: vancomycin and cefepime.  IV and oral vancomycin.  Continue norepinephrine drip.  Lactate level is ok.  Microbiology Results (last 7 days)     Procedure Component Value Units Date/Time    Urine Culture High Risk [[id number]] Collected: 10/20/20 0022    Order Status: Completed Specimen: Urine, Clean Catch Updated: 10/21/20 1928     Urine Culture, Routine No growth    Narrative:      Indicated criteria for high risk culture:->Other  Other (specify):->Neutropenic patient    Blood culture x two cultures. Draw prior to antibiotics. [[id number]] Collected: 10/15/20 1626    Order Status: Completed Specimen: Blood from Peripheral, Right Hand Updated: 10/21/20 0612     Blood Culture, Routine No growth after 5 days.    Narrative:      Aerobic and anaerobic    Blood culture x two cultures. Draw prior to antibiotics. [[id number]] Collected: 10/15/20 1546    Order Status: Completed Specimen: Blood from Antecubital, Right Arm Updated: 10/21/20 0612     Blood Culture, Routine No growth after 5 days.    Narrative:      Aerobic and anaerobic            Acute renal failure superimposed on stage 3 chronic kidney disease  Creatinine trending up   BMP reviewed- noted Estimated Creatinine Clearance: 29.7 mL/min (A) (based on SCr of 2.5 mg/dL (H)). according to latest data.   Monitor UOP and serial BMP and adjust therapy as needed. Renally dose meds.      Iron deficiency  History noted  Will follow Hematology recommendation      Moderate malnutrition  Nutrition consulted. Body mass index is 25.67 kg/m².. Encourage maximal PO intake. Diet supplementation ordered per nutrition approval. Will encourage PO and monitor closely for  weight changes.      Anemia in stage 3 chronic kidney disease  Patient's anemia is currently controlled. Has not received any PRBCs to date.. Etiology likely d/t  anemia of chronic disease  Current CBC reviewed-   Lab Results   Component Value Date    HGB 10.7 (L) 10/24/2020    HCT 32.5 (L) 10/24/2020     Monitor serial CBC and transfuse if patient becomes hemodynamically unstable, symptomatic or H/H drops below 7/21.         Anemia due to chemotherapy  Patient's anemia is currently controlled. Has not received any PRBCs to date.. Etiology likely d/t chemo  Current CBC reviewed-   Lab Results   Component Value Date    HGB 10.7 (L) 10/24/2020    HCT 32.5 (L) 10/24/2020     Monitor serial CBC and transfuse if patient becomes hemodynamically unstable, symptomatic or H/H drops below 7/21.         Hypokalemia due to excessive gastrointestinal loss of potassium  Resolved Give supplement and monitor level.    Potassium   Date Value Ref Range Status   10/24/2020 4.3 3.5 - 5.1 mmol/L Final   10/23/2020 3.9 3.5 - 5.1 mmol/L Final         Febrile neutropenia  Will treat empirically with  Vancomycin.  Improved  Monitor leukocyte count.  Hematology on the case  Granix given today.;    WBC   Date Value Ref Range Status   10/24/2020 11.71 3.90 - 12.70 K/uL Final       Post-transplant lymphoproliferative disorder (PTLD)  Has been receiving chemo for this.      -donor kidney transplant  Continue Prograf at usual dose.      Acquired hypothyroidism  Continue levothyroxine.    Metabolic acidosis  Acidosis worsening most likely non and an metabolic acidosis secondary to diarrhea and renal tubular disease  Nephrology on the case  Will continue sodium bicarb      VTE Risk Mitigation (From admission, onward)         Ordered     IP VTE LOW RISK PATIENT  Once      10/15/20 2220                Discharge Planning   TRINH:      Code Status: Full Code   Is the patient medically ready for discharge?:     Reason for patient still in hospital  (select all that apply): Patient trending condition and Treatment  Discharge Plan A: Home with family            Critical care time spent on the evaluation and treatment of severe organ dysfunction, review of pertinent labs and imaging studies, discussions with consulting providers and discussions with patient/family: 60 minutes.      Julia Summers MD  Department of Hospital Medicine   [facility]

## 2020-10-24 NOTE — ASSESSMENT & PLAN NOTE
Source is likely colitis from C.difficile.  Currently off pressors  Map appears to be above 70.  Will continue oral vancomycin.    Lactate level is ok.  Microbiology Results (last 7 days)     Procedure Component Value Units Date/Time    Urine Culture High Risk [[id number]] Collected: 10/20/20 0022    Order Status: Completed Specimen: Urine, Clean Catch Updated: 10/21/20 1928     Urine Culture, Routine No growth    Narrative:      Indicated criteria for high risk culture:->Other  Other (specify):->Neutropenic patient    Blood culture x two cultures. Draw prior to antibiotics. [[id number]] Collected: 10/15/20 1626    Order Status: Completed Specimen: Blood from Peripheral, Right Hand Updated: 10/21/20 0612     Blood Culture, Routine No growth after 5 days.    Narrative:      Aerobic and anaerobic    Blood culture x two cultures. Draw prior to antibiotics. [[id number]] Collected: 10/15/20 1546    Order Status: Completed Specimen: Blood from Antecubital, Right Arm Updated: 10/21/20 0612     Blood Culture, Routine No growth after 5 days.    Narrative:      Aerobic and anaerobic

## 2020-10-24 NOTE — ASSESSMENT & PLAN NOTE
Patient's anemia is currently controlled. Has not received any PRBCs to date.. Etiology likely d/t chemo  Current CBC reviewed-   Lab Results   Component Value Date    HGB 10.7 (L) 10/24/2020    HCT 32.5 (L) 10/24/2020     Monitor serial CBC and transfuse if patient becomes hemodynamically unstable, symptomatic or H/H drops below 7/21.

## 2020-10-24 NOTE — ASSESSMENT & PLAN NOTE
Source is likely colitis from C.difficile.  Map appears to be above 70.  Will titrate down the Levophed  Empiric antibiotcs: vancomycin and cefepime.  IV and oral vancomycin.  Continue norepinephrine drip.  Lactate level is ok.  Microbiology Results (last 7 days)     Procedure Component Value Units Date/Time    Urine Culture High Risk [[id number]] Collected: 10/20/20 0022    Order Status: Completed Specimen: Urine, Clean Catch Updated: 10/21/20 1928     Urine Culture, Routine No growth    Narrative:      Indicated criteria for high risk culture:->Other  Other (specify):->Neutropenic patient    Blood culture x two cultures. Draw prior to antibiotics. [[id number]] Collected: 10/15/20 1626    Order Status: Completed Specimen: Blood from Peripheral, Right Hand Updated: 10/21/20 0612     Blood Culture, Routine No growth after 5 days.    Narrative:      Aerobic and anaerobic    Blood culture x two cultures. Draw prior to antibiotics. [[id number]] Collected: 10/15/20 1546    Order Status: Completed Specimen: Blood from Antecubital, Right Arm Updated: 10/21/20 0612     Blood Culture, Routine No growth after 5 days.    Narrative:      Aerobic and anaerobic

## 2020-10-24 NOTE — PLAN OF CARE
POC progressing. Afebrile. VSS. TPN continues. Multiple bowel movements. Chg bath/linen change. No c/o pain. Safety maintained.

## 2020-10-24 NOTE — ASSESSMENT & PLAN NOTE
Acidosis improved   Was most likely secondary to diarrhea  Nephrology on the case  Will continue sodium bicarb

## 2020-10-24 NOTE — ASSESSMENT & PLAN NOTE
Acidosis persistent  Results for XIN TANG SANCHEZ (MRN [id number]) as of 10/24/2020 16:35   Ref. Range 10/24/2020 03:50   CO2 Latest Ref Range: 23 - 29 mmol/L 13 (L)   Anion Gap Latest Ref Range: 8 - 16 mmol/L 10      Was most likely secondary to diarrhea  Nephrology on the case  Will continue sodium bicarb

## 2020-10-24 NOTE — ASSESSMENT & PLAN NOTE
Creatinine trending down   BMP reviewed- noted Estimated Creatinine Clearance: 29.7 mL/min (A) (based on SCr of 2.5 mg/dL (H)). according to latest data.   Monitor UOP and serial BMP and adjust therapy as needed. Renally dose meds.  Results for TANG LAUREN (MRN [id number]) as of 10/24/2020 16:35   Ref. Range 10/23/2020 03:44 10/24/2020 03:50   BUN, Bld Latest Ref Range: 8 - 23 mg/dL 37 (H) 44 (H)   Creatinine Latest Ref Range: 0.5 - 1.4 mg/dL 2.8 (H) 2.5 (H)

## 2020-10-24 NOTE — PROGRESS NOTES
[facility]  Patient Name: Alin Burkett  : 1950  Age: 70 y.o.  Sex: male  MRN: [id number]  Admission Date: 10/15/2020  Hospital Length of Stay: 8 days  Code Status: Full Code   Admitting Provider: Brennan Decker MD  Attending Provider: Julia Summers MD  Primary Care Physician: Carmen Krueger MD  Full Code  Subjective:     Date of Visit: 10/23/2020             Principal Problem: Septic shock    Patient ID: Alin Burkett is a 70 y.o. 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 ED workup showed pt was neutropenic with WBC 0.2 with fever. Pt was cultured and given fluid resuscitation and vancomycin and cefepime. Stool cultures positive for C. Difficile toxin. CT abd/pelvis without contrast revealed severe proctocolitis without megacolon. Pt remains pancytopenic with most recent WBC 0.11 with ANC 0.0 and H/H 9.6/29.7 and platelets 91. Pt seen at bedside with his wife and states he has chronic throbbing abdominal pain made worse by eating and has had chronic diarrhea. He states he is fatigued and has decreased appetite along with fever/chills and night sweats.    10/19/2020: Pt seen at bedside and appears cachectic. He endorses that he is still having diarrhea, but that it has improved and decreased in frequency and consistency has thickened. Pt states abdominal pain has improved, but it still present. He endorses fatigue. Pt denies hemoptysis, hematochezia, melena, hematuria.    10/20/2020: Pt seen at bedside in ICU. He states that he is feeling less lethargic. He states his diarrhea is improving and that he only has abdominal pain to palpation.    10/21/2020: Pt seen at bedside in ICU NAD. He states his abdominal pain has decreased. He is still having diarrhea but  with less frequency.     10/22/2020: Pt seen at bedside in ICU. Pt states his abdominal pain has decreased and it no longer hurt to palpate his abdomen. Pt states diarrhea is improving.     10/23/2020: Pt seen at bedside in ICU. Pt states he no longer has abdominal pain and that his fatigue is improving and that his stool is thickening in consistency.    Review of Systems   Constitutional: Positive for activity change and fatigue. Negative for chills and fever.   HENT: Negative for mouth sores and trouble swallowing.    Eyes: Negative for photophobia and visual disturbance.   Respiratory: Negative for cough, chest tightness, shortness of breath, wheezing and stridor.    Cardiovascular: Negative for chest pain and leg swelling.   Gastrointestinal: Positive for abdominal distention and diarrhea. Negative for abdominal pain, constipation, nausea and vomiting.   Musculoskeletal: Negative for arthralgias, back pain and myalgias.   Skin: Negative for color change, pallor, rash and wound.   Neurological: Negative for syncope, speech difficulty and weakness.   Hematological: Negative for adenopathy. Does not bruise/bleed easily.   Psychiatric/Behavioral: Negative for agitation, behavioral problems, confusion, decreased concentration and dysphoric mood.        ONCOLOGY HISTORY:     1. Monomorphic post-transplant lymphoproliferative disorder              A. 2/2020: Noticed left inguinal lymphadenopathy after a dog bite (failed to resolve with antibiotics)              B. 6/2/2020: Saw Dr. Collins in infectious diseases for inguinal lymphadenopathy - referred for biopsy              C. 6/30/2020: Core biopsy of L inguinal lymph node shows B-cell lymphoma of germinal center origin; EBV-positive by LONNIE; morphology nondiagnostic of PTLD vs follicular lymphoma              D. 7/8/2020: PET/CT shows left internal iliac chain node measuring 3.3 x 3 cm with SUV max 37; L inguinal node measures 3.2 x 2.4 cm with SUV max 36               E. 2020: Excisional biopsy of left inguinal node shows monomorphic post-transplant lymphoproliferative disorder (DLBCL, GCB 60%, follicular lymphoma, grade 3B 40%); FISH for MYC rearrangement is negative              F. 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  Renal allograft biopsy 17 (DIVINE): 21 glomeruli, none globally sclerosed, <5% interstitial fibrosis, no ACR, c4d negative, AVR CCT Type 2 (V1 lesion); plan THYMO     Dissecting aortic aneurysm (any part), abdominal     Diverticulosis     Encounter for blood transfusion     ESRD (end stage renal disease) 2010    H/O urethral stricture 2018    H/O: urethral stricture     History of AAA (abdominal aortic aneurysm) repair     History of urethral stricture 2018    Hypertension     Hypokalemia     Hypothyroidism 1/10/2014    Inguinal hernia bilateral, non-recurrent     Kidney stones     Organ transplant candidate 2013    Plantar warts 1/10/2014    Recurrent UTI 2017    S/P kidney transplant     Secondary hyperparathyroidism, renal     Thyroid disease        Family History   Problem Relation Age of Onset    Diabetes Mother     Alzheimer's disease Mother     Alcohol abuse Father     HIV Brother     Stroke Maternal Aunt     Kidney disease Paternal Uncle     Kidney disease  Cousin     No Known Problems Sister     No Known Problems Daughter     No Known Problems Sister     No Known Problems Brother     No Known Problems Brother     Cancer Brother         thyroid cancer    Colon cancer Brother     Melanoma Neg Hx     Psoriasis Neg Hx     Lupus Neg Hx     Eczema Neg Hx     Colon polyps Neg Hx     Crohn's disease Neg Hx     Ulcerative colitis Neg Hx     Celiac disease Neg Hx        Past Surgical History:   Procedure Laterality Date    ABDOMINAL SURGERY      exploratory lapatomy x 2    ABLATION N/A 8/22/2019    Procedure: ABLATION, SVT;  Surgeon: Emerson Mcmanus MD;  Location: [facility] EP LAB;  Service: Cardiology;  Laterality: N/A;  SVT, RFA, CARTO, anes, GP, 323    AORTA - SUPERIOR MESENTERIC ARTERY BYPASS GRAFT      BLADDER NECK RECONSTRUCTION      BLADDER SURGERY      COLONOSCOPY  10/10/2013    Dr. Gutierrez, repeat in 5 years    CYSTOSCOPY      CYSTOSCOPY N/A 12/19/2018    Procedure: CYSTOSCOPY;  Surgeon: Dewey Mann MD;  Location: [address];  Service: Urology;  Laterality: N/A;  45 min    CYSTOURETHROSCOPY WITH DIRECT VISION INTERNAL URETHROTOMY N/A 12/19/2018    Procedure: CYSTOSCOPY, WITH DIRECT VISION INTERNAL URETHROTOMY;  Surgeon: Dewey Mann MD;  Location: [address];  Service: Urology;  Laterality: N/A;    DILATION OF URETHRA N/A 12/19/2018    Procedure: DILATION, URETHRA;  Surgeon: Dewey Mann MD;  Location: [address];  Service: Urology;  Laterality: N/A;    EXCISIONAL BIOPSY N/A 7/13/2020    Procedure: EXCISIONAL BIOPSY- LEFT INGUINAL NODE;  Surgeon: Vlad Epstein MD;  Location: [address];  Service: General;  Laterality: N/A;    FLEXIBLE CYSTOSCOPY N/A 11/6/2019    Procedure: CYSTOSCOPY, FLEXIBLE;  Surgeon: Dewey Mann MD;  Location: [facility] OR [address] [place] [address];  Service: Urology;  Laterality: N/A;    GASTROJEJUNOSTOMY      HEMORRHOID SURGERY      HERNIA REPAIR      INSERTION OF VENOUS ACCESS PORT Left 7/27/2020     Procedure: INSERTION, VENOUS ACCESS PORT;  Surgeon: Vlad Epstein MD;  Location: [address];  Service: General;  Laterality: Left;    KIDNEY TRANSPLANT      LEFT HEART CATHETERIZATION Left 8/20/2019    Procedure: Left heart cath;  Surgeon: Javan Oscar MD;  Location: [facility] CATH/EP LAB;  Service: Cardiology;  Laterality: Left;    LITHOTRIPSY      LYMPH NODE BIOPSY N/A 6/30/2020    Procedure: BIOPSY, LYMPH NODE;  [facility];  Location: [address];  Service: General;  Laterality: N/A;  189 lymph node biopsy /ULTRASOUND    PERCUTANEOUS NEPHROLITHOTRIPSY      right  ESWL  10/31/12    right ESWL  6/26/12    URETHROPLASTY USING PATCH GRAFT N/A 11/6/2019    Procedure: URETHROPLASTY, USING PATCH GRAFT BUCCAL MUCOSA GRAFT;  Surgeon: Dewey Mann MD;  Location: [address];  Service: Urology;  Laterality: N/A;  3 HOURS       Social History     Socioeconomic History    Marital status: Single     Spouse name: Not on file    Number of children: Not on file    Years of education: Not on file    Highest education level: Not on file   Occupational History     Employer: Disabled   Social Needs    Financial resource strain: Not on file    Food insecurity     Worry: Not on file     Inability: Not on file    Transportation needs     Medical: Not on file     Non-medical: Not on file   Tobacco Use    Smoking status: Former Smoker     Packs/day: 0.50     Years: 40.00     Pack years: 20.00     Types: Cigarettes     Quit date: 6/16/2010     Years since quitting: 10.3    Smokeless tobacco: Never Used   Substance and Sexual Activity    Alcohol use: Yes     Alcohol/week: 3.0 standard drinks     Types: 3 Cans of beer per week     Comment: occasional/social    Drug use: No     Comment: THC in youth    Sexual activity: Yes     Partners: Female     Birth control/protection: None   Lifestyle    Physical activity     Days per week: Not on file     Minutes per session: Not on  file    Stress: Not on file   Relationships    Social connections     Talks on phone: Not on file     Gets together: Not on file     Attends Mosque service: Not on file     Active member of club or organization: Not on file     Attends meetings of clubs or organizations: Not on file     Relationship status: Not on file   Other Topics Concern    Not on file   Social History Narrative    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       Current Facility-Administered Medications   Medication Dose Route Frequency Provider Last Rate Last Dose    acetaminophen tablet 650 mg  650 mg Oral Q4H PRN Brennan Decker MD   650 mg at 10/16/20 1259    albuterol-ipratropium 2.5 mg-0.5 mg/3 mL nebulizer solution 3 mL  3 mL Nebulization Q6H PRN Michelle Jiménez MD   3 mL at 10/20/20 1602    allopurinoL tablet 100 mg  100 mg Oral Daily Lola Tolbert MD   100 mg at 10/23/20 0930    Amino acid 4.25% - dextrose 5% (CLINIMIX-E) solution with additives (1L provides 42.5 gm AA, 50 gm CHO (170 kcal/L dextrose), Na 35, K 30, Mg 5, Ca 4.5, Acetate 70, Cl 39, Phos 15)   Intravenous Continuous Julia Summers MD 85 mL/hr at 10/23/20 1545      aspirin EC tablet 81 mg  81 mg Oral Daily Brennan Decker MD   81 mg at 10/23/20 0930    brimonidine-timoloL 0.2-0.5 % ophthalmic solution 1 drop  1 drop Both Eyes Q12H Brennan Decker MD   1 drop at 10/23/20 0935    calcitRIOL capsule 0.5 mcg  0.5 mcg Oral Daily Brennan Decker MD   0.5 mcg at 10/23/20 0930    cholestyramine-aspartame 4 gram packet 4 g  1 packet Oral BID Michelle Jiménez MD   4 g at 10/23/20 0930    dicyclomine capsule 10 mg  10 mg Oral QID Lola Tolbert MD   10 mg at 10/23/20 1700    dronabinoL capsule 5 mg  5 mg Oral BID Julia Summers MD   5 mg at 10/23/20 0930    famotidine tablet 20 mg  20 mg Oral QHS Brennan Decker MD   20 mg at 10/22/20 2150    fat emulsion 20% infusion 250 mL  250 mL Intravenous Daily Julia Summers MD         ferrous sulfate EC tablet 325 mg  325 mg Oral BID Alexi Camp PA-C   325 mg at 10/23/20 0930    hyoscyamine ODT 0.125 mg  0.125 mg Sublingual Q4H PRN Lola Tolbert MD   0.125 mg at 10/17/20 1604    Lactobacillus acidoph-L.bulgar 1 million cell tablet 4 tablet  4 tablet Oral TID  Michelle Jiménez MD   4 tablet at 10/23/20 1721    levothyroxine tablet 100 mcg  100 mcg Oral Before breakfast Brennan Decker MD   100 mcg at 10/23/20 0517    magnesium sulfate 2g in water 50mL IVPB (premix)  2 g Intravenous PRN Brennan Decker MD        magnesium sulfate 2g in water 50mL IVPB (premix)  4 g Intravenous PRN Brennan Decker MD        metoclopramide HCl injection 10 mg  10 mg Intravenous Q6H PRN Brennan Decker MD        metronidazole IVPB 500 mg  500 mg Intravenous Q8H Lola Tolbert  mL/hr at 10/23/20 1050 500 mg at 10/23/20 1050    midodrine tablet 10 mg  10 mg Oral TID Julia Summers MD   10 mg at 10/23/20 1507    morphine injection 2 mg  2 mg Intravenous Q4H PRN Julia Summers MD   2 mg at 10/20/20 2213    NORepinephrine bitartrate 8 mg in dextrose 5% 250 mL infusion  0.02 mcg/kg/min Intravenous Continuous Brennan Decker MD   Stopped at 10/22/20 2000    ondansetron disintegrating tablet 8 mg  8 mg Oral Q6H PRN Brennan Decker MD        potassium chloride 10 mEq in 100 mL IVPB  40 mEq Intravenous PRN Brennan Decker  mL/hr at 10/20/20 0729 40 mEq at 10/20/20 0729    And    potassium chloride 10 mEq in 100 mL IVPB  60 mEq Intravenous PRN Brennan Decker MD        And    potassium chloride 10 mEq in 100 mL IVPB  80 mEq Intravenous PRN Brennan Decker MD        promethazine tablet 25 mg  25 mg Oral Q6H PRN Brennan Decker MD        sodium chloride 0.9% flush 10 mL  10 mL Intravenous PRN Brennan Decker MD        tacrolimus capsule 2 mg  2 mg Oral Daily PM Brennan Decker MD   2 mg at 10/23/20 1721    tacrolimus capsule 3 mg  3 mg Oral Daily AM  Brennan Decker MD   3 mg at 10/23/20 0746    travoprost 0.004 % ophthalmic solution 1 drop  1 drop Both Eyes QHS Brennan Decker MD   1 drop at 10/22/20 2150    vancomycin 25 mg/mL oral solution 125 mg  125 mg Oral Q6H Lola Tolbert MD   125 mg at 10/23/20 1721        allopurinoL  100 mg Oral Daily    aspirin  81 mg Oral Daily    brimonidine-timoloL  1 drop Both Eyes Q12H    calcitRIOL  0.5 mcg Oral Daily    cholestyramine-aspartame  1 packet Oral BID    dicyclomine  10 mg Oral QID    dronabinoL  5 mg Oral BID    famotidine  20 mg Oral QHS    fat emulsion 20%  250 mL Intravenous Daily    ferrous sulfate  325 mg Oral BID    Lactobacillus acidoph-L.bulgar  4 tablet Oral TID WM    levothyroxine  100 mcg Oral Before breakfast    metronidazole  500 mg Intravenous Q8H    midodrine  10 mg Oral TID    tacrolimus  2 mg Oral Daily PM    tacrolimus  3 mg Oral Daily AM    travoprost  1 drop Both Eyes QHS    vancomycin  125 mg Oral Q6H        Amino acid 4.25% - dextrose 5% (CLINIMIX-E) solution with additives (1L provides 42.5 gm AA, 50 gm CHO (170 kcal/L dextrose), Na 35, K 30, Mg 5, Ca 4.5, Acetate 70, Cl 39, Phos 15) 85 mL/hr at 10/23/20 1545    norepinephrine bitartrate-D5W Stopped (10/22/20 2000)       acetaminophen, albuterol-ipratropium, hyoscyamine, magnesium sulfate IVPB, magnesium sulfate IVPB, metoclopramide HCl, morphine, ondansetron, potassium chloride in water **AND** potassium chloride in water **AND** potassium chloride in water, promethazine, sodium chloride 0.9%    Antibiotics (From admission, onward)    Start     Stop Route Frequency Ordered    10/23/20 1800  vancomycin 25 mg/mL oral solution 125 mg  (C. difficile Infection (CDI) Treatment Order Panel)      11/06 1759 Oral Every 6 hours 10/23/20 1623    10/16/20 1900  metronidazole IVPB 500 mg      -- IV Every 8 hours (non-standard times) 10/16/20 1755          Review of patient's allergies indicates:  No Known Allergies  All  medications and past history have been reviewed.    Objective:      Vitals:  Patient Vitals for the past 24 hrs:   BP Temp Temp src Pulse Resp SpO2   10/23/20 1800 116/77 -- -- 77 (!) 21 98 %   10/23/20 1700 113/83 -- -- 76 19 98 %   10/23/20 1630 120/77 -- -- 75 (!) 21 100 %   10/23/20 1600 110/73 98 °F (36.7 °C) Oral 78 20 99 %   10/23/20 1530 105/73 -- -- 78 (!) 21 98 %   10/23/20 1500 98/68 -- -- 78 17 99 %   10/23/20 1450 103/72 -- -- 81 (!) 23 (!) 94 %   10/23/20 1430 95/62 -- -- 83 (!) 22 99 %   10/23/20 1400 -- -- -- 91 (!) 35 (!) 71 %   10/23/20 1300 108/72 -- -- 83 19 99 %   10/23/20 1200 117/72 98.1 °F (36.7 °C) Oral 79 19 99 %   10/23/20 1100 117/75 -- -- 83 19 100 %   10/23/20 1000 119/74 -- -- 87 20 99 %   10/23/20 0930 111/78 -- -- 89 19 98 %   10/23/20 0900 97/60 -- -- 90 20 98 %   10/23/20 0858 -- -- -- 91 19 99 %   10/23/20 0830 104/67 -- -- 90 (!) 30 (!) 94 %   10/23/20 0800 129/78 98.3 °F (36.8 °C) Oral 92 (!) 24 (!) 91 %   10/23/20 0700 99/66 -- -- 87 19 98 %   10/23/20 0600 111/71 -- -- 86 19 99 %   10/23/20 0500 115/75 -- -- 89 20 100 %   10/23/20 0400 (!) 148/96 98.3 °F (36.8 °C) Skin 92 (!) 29 95 %   10/23/20 0300 107/71 -- -- 87 (!) 21 99 %   10/23/20 0200 105/72 -- -- 85 (!) 21 100 %   10/23/20 0100 107/69 -- -- 90 19 98 %   10/23/20 0000 103/63 98.1 °F (36.7 °C) Skin 89 19 98 %   10/22/20 2300 99/69 -- -- 89 19 97 %   10/22/20 2200 135/71 -- -- 97 (!) 28 (!) 90 %   10/22/20 2100 107/74 -- -- 92 20 99 %   10/22/20 2000 112/70 98.5 °F (36.9 °C) Oral 93 18 99 %   10/22/20 1930 110/77 -- -- 92 19 97 %      Body mass index is 24.2 kg/m².  Body surface area is 1.99 meters squared.    Last 24 Hours:    Intake/Output Summary (Last 24 hours) at 10/23/2020 1903  Last data filed at 10/23/2020 1800  Gross per 24 hour   Intake 2905.2 ml   Output 2000 ml   Net 905.2 ml     Weight Readings:  Wt Readings from Last 5 Encounters:   10/22/20 78.7 kg (173 lb 8 oz)   10/10/20 68.5 kg (151 lb)   10/09/20 66.4  kg (146 lb 6.2 oz)   10/02/20 65.8 kg (145 lb)   09/11/20 68.6 kg (151 lb 2 oz)      Blood Type:  B POS     Physical Exam  Constitutional:       Appearance: He is ill-appearing.   HENT:      Head: Normocephalic and atraumatic.      Right Ear: External ear normal.      Left Ear: External ear normal.      Nose: Nose normal. No congestion or rhinorrhea.   Eyes:      General:         Right eye: No discharge.         Left eye: No discharge.      Extraocular Movements: Extraocular movements intact.      Conjunctiva/sclera: Conjunctivae normal.      Pupils: Pupils are equal, round, and reactive to light.   Neck:      Musculoskeletal: Normal range of motion and neck supple. No neck rigidity.   Cardiovascular:      Rate and Rhythm: Normal rate and regular rhythm.      Heart sounds: Normal heart sounds. No murmur.   Pulmonary:      Effort: Pulmonary effort is normal. No respiratory distress.      Breath sounds: Normal breath sounds. No stridor. No wheezing, rhonchi or rales.   Abdominal:      General: Bowel sounds are normal. There is distension.      Palpations: Abdomen is soft.      Tenderness: There is no abdominal tenderness. There is no guarding.   Musculoskeletal: Normal range of motion.         General: No deformity.      Right lower leg: No edema.      Left lower leg: No edema.   Lymphadenopathy:      Cervical: No cervical adenopathy.   Skin:     General: Skin is warm and dry.      Coloration: Skin is not pale.      Findings: Bruising (bialteral UE and chest) present. No erythema or rash.   Neurological:      General: No focal deficit present.      Mental Status: He is alert and oriented to person, place, and time. Mental status is at baseline.      Cranial Nerves: No cranial nerve deficit.   Psychiatric:         Mood and Affect: Mood normal.         Behavior: Behavior normal.         Thought Content: Thought content normal.         Judgment: Judgment normal.         Labs:  Recent Labs   Lab 10/21/20  4955  10/22/20  0411 10/23/20  0344   WBC 14.81* 12.13 9.40   RBC 3.15* 3.25* 2.97*   HGB 10.1* 10.3* 9.4*   HCT 30.0* 30.5* 28.1*   PLT 76* 115* 134*   MCV 95 94 95     Recent Labs   Lab 10/20/20  2352 10/21/20  0354 10/22/20  0411 10/23/20  0344   * 132* 135* 133*   K 4.3 4.1 4.2 3.9   * 111* 111* 110   CO2 12* 11* 18* 15*   BUN 37* 35* 34* 37*   CREATININE 2.5* 2.6* 2.8* 2.8*   GLU 92 92 103 112*   CALCIUM 7.9* 7.9* 7.8* 7.6*   PHOS  --  2.8  --   --    ALKPHOS 154*  --   --   --    PROT 4.2*  --   --   --    ALBUMIN 1.5* 1.4*  --   --    BILITOT 0.5  --   --   --    AST 21  --   --   --    ALT 18  --   --   --        Imaging:  Results for orders placed or performed during the hospital encounter of 10/15/20 (from the past 2160 hour(s))   CT Abdomen Pelvis  Without Contrast    Impression    Findings consistent with severe proctocolitis.    Transplanted kidney right side of the pelvis with mild pelvocaliectasis and perinephric stranding nonspecific.  No calcified stones seen    Additional findings as detailed above including cystic lesion with in the native right kidney versus dilatation of collecting structure of the native right kidney.  Stones in the native right kidney.  Cystic lesion within the pancreas.    Final read    Virtual Radiology concordant      Electronically signed by: Ada Castillo MD  Date:    10/16/2020  Time:    08:56   Results for orders placed or performed during the hospital encounter of 08/06/20 (from the past 2160 hour(s))   CT Chest Abdomen Pelvis Without Contrast (XPD)    Impression    No acute abdominal pathology identified.    Nonvisualization of the left kidney with malrotation of the right kidney and multiple nonobstructing renal stones.  No hydronephrosis.    Right pelvic renal allograft with no evidence for renal allograft focal lesion, nephrolithiasis, or hydronephrosis.    Stable mild ectasia of the infrarenal abdominal aorta measuring up to 2.6 cm without evidence for  aortic aneurysm or other acute aortic pathology.  Atherosclerosis identified.    Multiple enlarged lymph nodes in the left inguinal region, with interval dissection of multiple left inguinal and pelvic lymph nodes when compared to nuclear medicine PET-CT 07/08/2020.  Correlate with biopsy results.    Stable cystic appearing lesion in the pancreatic head measuring 1.2 cm.    Other findings as above.    Electronically signed by resident: Mati Cárdenas  Date:    08/06/2020  Time:    09:23    Electronically signed by: Jayson Staples MD  Date:    08/06/2020  Time:    10:34     *Note: Due to a large number of results and/or encounters for the requested time period, some results have not been displayed. A complete set of results can be found in Results Review.     PET CT 07/08/2020  FINDINGS:  Quality of the study: Adequate.     In the neck, there is no abnormal hypermetabolic activity worrisome for malignancy.  Vascular stent identified in the left axillary region.  No significant lymphadenopathy.     In the chest, there is no abnormal hypermetabolic activity worsened malignancy.  Coronary atherosclerosis.  0.4 cm pulmonary nodule identified in the left upper lobe (axial series 3, image 67), lesion is too small to characterize with PET-CT. Recommend attenuation expected follow-up examinations. No significant lymphadenopathy.     In the abdomen and pelvis, there is hypermetabolic lymphadenopathy throughout the left internal/external iliac chain and left groin.  New left internal iliac chain node measures 3.3 x 3 0 cm with SUV max of 37 (axial fused image 193).  Left inguinal node measures approximately 3.2 x 2.4 cm with SUV max of 36 (axial fused image 218), previously measured up to 1.4 cm.  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  The right kidney appears atrophic with abnormal contour parenchymal calcifications, unchanged.  Right lower quadrant transplant kidney in place.  Stable small bladder diverticulum.  Stable 1.2 cm  pancreatic cyst, better characterized on most recent CT with IV contrast.  Stable bilateral probable adrenal adenomas.  Colonic diverticulosis without evidence of acute diverticulitis.  Stable fusiform abdominal aortic ectasia.     Spleen appears upper limit of normal for size measuring 12.0 cm in craniocaudal dimension.  Normal heterogeneous uptake similar to liver.     In the bones, there is no abnormal activity worrisome for malignancy.  Additional focus of increased uptake identified within the myofascial structures of the thigh, just deep to the left femur with SUV max of 27 (axial fused image 269).     Impression:     Hypermetabolic lymphadenopathy throughout the left iliac chain and left groin with additional hypermetabolic focus in the left thigh.  No hypermetabolic juan david disease above the diaphragm.  The Deauville score is 5.    All lab results and imaging results have been reviewed.    Assessment and Plan:      Present on Admission:   Febrile neutropenia   Septic shock   Acquired hypothyroidism   Post-transplant lymphoproliferative disorder (PTLD)   Acute renal failure superimposed on stage 3 chronic kidney disease   Hypokalemia due to excessive gastrointestinal loss of potassium   Anemia due to chemotherapy   Anemia in stage 3 chronic kidney disease   Moderate malnutrition   Metabolic acidosis       Post-transplant lymphoproliferative disorder (PTLD)  -Cycle 4 RCHOP completed on 10/09/2020.  -Please have patient follow up with Primary Oncologist Dr. Luis Fernando Lopez within 72 hours of discharge.      C-difficile colitis  -Follow ID recommendations.     DIVINE  -Followed by Nephrology.      Normocytic Anemia with Thrombocytopenia  -Mixed anemia. H/H decreased to 9.4/28.1. Continue ferrous sulfate 325mg PO BID. Pt had low serum iron <10 and saturated iron not able to calculate. Orders placed for updated iron and tibc and ferritin in the AM to assess serum iron levels.  -Neutropenia resolved. Pt given  Granix 300mcg daily x 3 doses and ANC WNL with last granix dose administered 10/18/2020.   -Platelets have improved to 134.    Sincerely,  Alexi Camp PA-C    Note is available for collaborating MD; Dr. Cathy Stafford for review.    Electronically signed by: Alexi Camp PA-C

## 2020-10-24 NOTE — PROGRESS NOTES
" INPATIENT NEPHROLOGY PROGRESS  [facility] NEPHROLOGY    Alin Burkett  10/24/2020    Reason for consultation:    Acute kidney injury     Chief Complaint:   Chief Complaint   Patient presents with    Diarrhea     for the past 3 days,last received chemotherapy 6 days ago.     Abdominal Pain          History of Present Illness:    Per H and P    Patient has been having diarrhea with "jelly-like" consistency as well as crampiness in the abdomen.  Symptoms began roughly one week ago.  Also has had fever and malaise.  Appetite poor.  Fatigue as well.  He has been receiving chemo for PTLD; most recent cycle of CHOP was end of last week.  He has history of renal transplant four years ago and is currently on twice-a-day  Prograf.  He's had no cough, no unusual headache, no sinus pain, and no burning on urination.     10/20  Has some diarrhea and abdominal pain.  No nausea, chest pain, sob, new neuro symptoms, new joint pain.  He is very weak.    I told him that he had previously been seen by doctor Shonda and I would call him to see him.  He stated he didn't want to see Dr Merchant and requested that I become his nephrologist.    10/21  Not much change in renal function since yesterday. UOP 1.3L.  Sleeping quietly.  10/23  Sleeping.  1550 urine output.    10/24 VSS, no new complains. Appreciate ID and Heme input.    Plan of Care:    Acute kidney injury likely hemodynamically mediated, c.diff colitis  --urine sodium low implicating renal hypoperfusion   --avoid NSAIDS, Sales II inhibitors, and other non-essential nephrotoxic agents  --renal dose medication for crcl 10-50  --keep map above 55  --treat c.diff    nongap metabolic acidosis likely combination of GI losses and renal tubular defect (urine pH inappropriately high)  - urine anion gap not accurate due to low urine sodium     S/p renal transplant  PTLD lymphoma  --tacrolimus level therapeutic  --appreciate Heme input    Hyponatremia  --better  --avoid hypotonic iv piggy " backs  --no ssri antidepressants or thiazide diuretics    Thank you for allowing us to participate in this patient's care. We will continue to follow.    Vital Signs:  Temp Readings from Last 3 Encounters:   10/23/20 98.2 °F (36.8 °C) (Oral)   10/10/20 98.3 °F (36.8 °C) (Oral)   10/09/20 98.6 °F (37 °C)       Pulse Readings from Last 3 Encounters:   10/24/20 96   10/10/20 95   10/09/20 (!) 56       BP Readings from Last 3 Encounters:   10/24/20 121/87   10/10/20 102/63   10/09/20 115/76       Weight:  Wt Readings from Last 3 Encounters:   10/22/20 78.7 kg (173 lb 8 oz)   10/10/20 68.5 kg (151 lb)   10/09/20 66.4 kg (146 lb 6.2 oz)       Past Medical & Surgical History:  Past Medical History:   Diagnosis Date    Acidosis     Adrenal adenoma     Anemia associated with chronic renal failure     Arrhythmia, onset 2015    Awaiting organ transplant status 2013    Basal cell carcinoma 2012    left nasal tip    Blood type B+ 2013    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  Renal allograft biopsy 17 (DIVINE): 21 glomeruli, none globally sclerosed, <5% interstitial fibrosis, no ACR, c4d negative, AVR CCT Type 2 (V1 lesion); plan THYMO     Dissecting aortic aneurysm (any part), abdominal     Diverticulosis     Encounter for blood transfusion     ESRD (end stage renal disease) 2010    H/O urethral stricture 2018    H/O: urethral stricture     History of AAA (abdominal aortic aneurysm) repair     History of urethral stricture 2018    Hypertension     Hypokalemia     Hypothyroidism 1/10/2014    Inguinal hernia bilateral, non-recurrent     Kidney stones     Organ transplant candidate 2013     Plantar warts 1/10/2014    Recurrent UTI 7/28/2017    S/P kidney transplant     Secondary hyperparathyroidism, renal     Thyroid disease        Past Surgical History:   Procedure Laterality Date    ABDOMINAL SURGERY      exploratory lapatomy x 2    ABLATION N/A 8/22/2019    Procedure: ABLATION, SVT;  Surgeon: Emerson Mcmanus MD;  Location: [facility] EP LAB;  Service: Cardiology;  Laterality: N/A;  SVT, RFA, CARTO, anes, GP, 323    AORTA - SUPERIOR MESENTERIC ARTERY BYPASS GRAFT      BLADDER NECK RECONSTRUCTION      BLADDER SURGERY      COLONOSCOPY  10/10/2013    Dr. Gutierrez, repeat in 5 years    CYSTOSCOPY      CYSTOSCOPY N/A 12/19/2018    Procedure: CYSTOSCOPY;  Surgeon: Dewey Mann MD;  [facility];  Service: Urology;  Laterality: N/A;  45 min    CYSTOURETHROSCOPY WITH DIRECT VISION INTERNAL URETHROTOMY N/A 12/19/2018    Procedure: CYSTOSCOPY, WITH DIRECT VISION INTERNAL URETHROTOMY;  Surgeon: Dewey aMnn MD;  [address]  Service: Urology;  Laterality: N/A;    DILATION OF URETHRA N/A 12/19/2018    Procedure: DILATION, URETHRA;  Surgeon: Dewey Mann MD;  [facility];  Service: Urology;  Laterality: N/A;    EXCISIONAL BIOPSY N/A 7/13/2020    Procedure: EXCISIONAL BIOPSY- LEFT INGUINAL NODE;  Surgeon: Vlad Epstein MD;  Location: [facility] OR 2ND FLR;  Service: General;  Laterality: N/A;    FLEXIBLE CYSTOSCOPY N/A 11/6/2019    Procedure: CYSTOSCOPY, FLEXIBLE;  Surgeon: Dewey Mann MD;  [facility];  Service: Urology;  Laterality: N/A;    GASTROJEJUNOSTOMY      HEMORRHOID SURGERY      HERNIA REPAIR      INSERTION OF VENOUS ACCESS PORT Left 7/27/2020    Procedure: INSERTION, VENOUS ACCESS PORT;  Surgeon: Vlad Epstein MD;  Location: [facility] OR 2ND FLR;  Service: General;  Laterality: Left;    KIDNEY TRANSPLANT      LEFT HEART CATHETERIZATION Left 8/20/2019    Procedure: Left heart cath;  Surgeon: Javan Oscar MD;   Location: [facility] CATH/EP LAB;  Service: Cardiology;  Laterality: Left;    LITHOTRIPSY      LYMPH NODE BIOPSY N/A 6/30/2020    Procedure: BIOPSY, LYMPH NODE;  Surgeon: Ella Diagnostic Provider;  Location: [address];  Service: General;  Laterality: N/A;  189 lymph node biopsy /ULTRASOUND    PERCUTANEOUS NEPHROLITHOTRIPSY      right  ESWL  10/31/12    right ESWL  6/26/12    URETHROPLASTY USING PATCH GRAFT N/A 11/6/2019    Procedure: URETHROPLASTY, USING PATCH GRAFT BUCCAL MUCOSA GRAFT;  Surgeon: Dewey Mann MD;  Location: [facility] OR [address] [place] [address];  Service: Urology;  Laterality: N/A;  3 HOURS       Past Social History:  Social History     Socioeconomic History    Marital status: Single     Spouse name: Not on file    Number of children: Not on file    Years of education: Not on file    Highest education level: Not on file   Occupational History     Employer: Disabled   Social Needs    Financial resource strain: Not on file    Food insecurity     Worry: Not on file     Inability: Not on file    Transportation needs     Medical: Not on file     Non-medical: Not on file   Tobacco Use    Smoking status: Former Smoker     Packs/day: 0.50     Years: 40.00     Pack years: 20.00     Types: Cigarettes     Quit date: 6/16/2010     Years since quitting: 10.3    Smokeless tobacco: Never Used   Substance and Sexual Activity    Alcohol use: Yes     Alcohol/week: 3.0 standard drinks     Types: 3 Cans of beer per week     Comment: occasional/social    Drug use: No     Comment: THC in youth    Sexual activity: Yes     Partners: Female     Birth control/protection: None   Lifestyle    Physical activity     Days per week: Not on file     Minutes per session: Not on file    Stress: Not on file   Relationships    Social connections     Talks on phone: Not on file     Gets together: Not on file     Attends Caodaism service: Not on file     Active member of club or organization: Not on file     Attends meetings of clubs  or organizations: Not on file     Relationship status: Not on file   Other Topics Concern    Not on file   Social History Narrative    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      Current Outpatient Medications on File Prior to Encounter   Medication Sig Dispense Refill    allopurinoL (ZYLOPRIM) 300 MG tablet Take 1 tablet (300 mg total) by mouth once daily. 30 tablet 2    aspirin (ECOTRIN) 81 MG EC tablet Take 1 tablet (81 mg total) by mouth once daily.  0    calcitRIOL (ROCALTROL) 0.5 MCG Cap Take 1 capsule (0.5 mcg total) by mouth once daily. 30 capsule 11    cefpodoxime (VANTIN) 100 MG tablet Take 1 tablet (100 mg total) by mouth every 12 (twelve) hours. 60 tablet 3    COMBIGAN 0.2-0.5 % Drop Place 1 drop into both eyes 2 (two) times a day.       famotidine (PEPCID) 20 MG tablet TAKE 1 TABLET EVERY EVENING (Patient taking differently: Take 20 mg by mouth every evening. ) 90 tablet 3    ketoconazole (NIZORAL) 200 mg Tab TAKE ONE-HALF (1/2) TABLET ONCE DAILY (Patient taking differently: Take 100 mg by mouth once daily. ) 45 tablet 3    levothyroxine (SYNTHROID) 100 MCG tablet TAKE 1 TABLET DAILY (Patient taking differently: Take 100 mcg by mouth before breakfast. Administer on an empty stomach at least 30 minutes before food. If receiving tube feeds, HOLD tube feeds for 1 hour before and after levothyroxine administration.) 90 tablet 3    magnesium oxide (MAG-OX) 400 mg (241.3 mg magnesium) tablet Take 1 tablet (400 mg total) by mouth once daily. 90 tablet 3    multivitamin (ONE DAILY MULTIVITAMIN) per tablet Take 1 tablet by mouth once daily.      ondansetron (ZOFRAN-ODT) 8 MG TbDL Dissolve 1 tablet (8 mg total) by mouth every 12 (twelve) hours as needed. (Patient taking differently: Take 8 mg by mouth every 12 (twelve) hours as needed (nausea). ) 21 tablet 1    predniSONE (DELTASONE) 50 MG Tab Take  2 tablets (100 mg total) by mouth once daily. Take on days 2-5 of your chemotherapy cycles. 8 tablet 0    sodium bicarbonate 650 MG tablet Take 1 tablet (650 mg total) by mouth 2 (two) times daily. 540 tablet 3    tacrolimus (PROGRAF) 1 MG Cap Take 3 capsules (3 mg total) by mouth every morning AND 2 capsules (2 mg total) every evening. Z94.0/Kidney Transplant on 11/26/16. 150 capsule 11    travoprost (TRAVATAN Z) 0.004 % ophthalmic solution Place 1 drop into both eyes every evening.        Scheduled Meds:   allopurinoL  100 mg Oral Daily    aspirin  81 mg Oral Daily    brimonidine-timoloL  1 drop Both Eyes Q12H    calcitRIOL  0.5 mcg Oral Daily    cholestyramine-aspartame  1 packet Oral BID    dicyclomine  10 mg Oral QID    dronabinoL  5 mg Oral BID    famotidine  20 mg Oral QHS    fat emulsion 20%  250 mL Intravenous Daily    ferrous sulfate  325 mg Oral BID    Lactobacillus acidoph-L.bulgar  4 tablet Oral TID WM    levothyroxine  100 mcg Oral Before breakfast    metronidazole  500 mg Intravenous Q8H    midodrine  10 mg Oral TID    tacrolimus  2 mg Oral Daily PM    tacrolimus  3 mg Oral Daily AM    travoprost  1 drop Both Eyes QHS    vancomycin  125 mg Oral Q6H     Continuous Infusions:   Amino acid 4.25% - dextrose 5% (CLINIMIX-E) solution with additives (1L provides 42.5 gm AA, 50 gm CHO (170 kcal/L dextrose), Na 35, K 30, Mg 5, Ca 4.5, Acetate 70, Cl 39, Phos 15) 85 mL/hr at 10/23/20 1545    norepinephrine bitartrate-D5W Stopped (10/22/20 2000)     PRN Meds:.acetaminophen, albuterol-ipratropium, hyoscyamine, magnesium sulfate IVPB, magnesium sulfate IVPB, metoclopramide HCl, morphine, ondansetron, potassium chloride in water **AND** potassium chloride in water **AND** potassium chloride in water, promethazine, sodium chloride 0.9%    Allergies:  Patient has no known allergies.    Past Family History:  Reviewed; refer to Hospitalist Admission Note    Review of Systems:  Review of Systems -  "All 14 systems reviewed and negative, except as noted in HPI    Physical Exam:    /87   Pulse 96   Temp 98.2 °F (36.8 °C) (Oral)   Resp (!) 24   Ht 5' 11" (1.803 m)   Wt 78.7 kg (173 lb 8 oz)   SpO2 99%   BMI 24.20 kg/m²     General Appearance:    Alert, cooperative, no distress, appears stated age   Head:    Normocephalic, without obvious abnormality, atraumatic   Eyes:    PER, conjunctiva/corneas clear, EOM's intact in both eyes        Throat:   Lips, mucosa, and tongue normal; teeth and gums normal   Back:     Symmetric, no curvature, ROM normal, no CVA tenderness   Lungs:     Clear to auscultation bilaterally, respirations unlabored   Chest wall:    No tenderness or deformity   Heart:    Regular rate and rhythm, S1 and S2 normal, no murmur, rub   or gallop   Abdomen:     Tender to palpation   Extremities:   Extremities normal, atraumatic, no cyanosis or edema   Pulses:   2+ and symmetric all extremities   MSK:   No joint or muscle swelling, tenderness or deformity   Skin:   Skin color, texture, turgor normal, no rashes or lesions   Neurologic:   CNII-XII intact, normal strength and sensation       Throughout.  No flap     Results:  Lab Results   Component Value Date     (L) 10/24/2020    K 4.3 10/24/2020     10/24/2020    CO2 13 (L) 10/24/2020    BUN 44 (H) 10/24/2020    CREATININE 2.5 (H) 10/24/2020    CALCIUM 7.9 (L) 10/24/2020    ANIONGAP 10 10/24/2020    ESTGFRAFRICA 29 (A) 10/24/2020    EGFRNONAA 25 (A) 10/24/2020       Lab Results   Component Value Date    CALCIUM 7.9 (L) 10/24/2020    PHOS 2.8 10/21/2020       Recent Labs   Lab 10/24/20  0351   WBC 11.71   RBC 3.35*   HGB 10.7*   HCT 32.5*      MCV 97   MCH 31.9*   MCHC 32.9          I have personally reviewed pertinent radiological imaging and reports.       "

## 2020-10-24 NOTE — PROGRESS NOTES
"[facility] Medicine  Progress Note    Patient Name: Alin Brukett  MRN: [id number]  Patient Class: IP- Inpatient   Admission Date: 10/15/2020  Length of Stay: 9 days  Attending Physician: Julia Summers MD  Primary Care Provider: Carmen Krueger MD        Subjective:     Principal Problem:Severe sepsis        HPI:  Patient has been having diarrhea with "jelly-like" consistency as well as crampiness in the abdomen.  Symptoms began roughly one week ago.  Also has had fever and malaise.  Appetite poor.  Fatigue as well.  He has been receiving chemo for PTLD; most recent cycle of CHOP was end of last week.  He has history of renal transplant four years ago and is currently on twice-a-day  Prograf.  He's had no cough, no unusual headache, no sinus pain, and no burning on urination.  He feels that he's probably dehydrated.    Overview/Hospital Course:  No notes on file    Interval History: Continues to have poor appetite. States that he feels  Much better. Vitals stable.     Review of Systems   Constitutional: Positive for fatigue. Negative for appetite change, chills and fever.   HENT: Negative for congestion, hearing loss, rhinorrhea, sore throat, trouble swallowing and voice change.    Respiratory: Negative for cough, chest tightness, shortness of breath and wheezing.    Cardiovascular: Negative for chest pain, palpitations and leg swelling.   Gastrointestinal: Positive for abdominal pain and diarrhea. Negative for blood in stool, nausea and vomiting.   Genitourinary: Negative for difficulty urinating, frequency, hematuria and urgency.   Musculoskeletal: Negative for back pain, joint swelling and neck stiffness.   Skin: Negative for pallor and rash.   Neurological: Negative for tremors, seizures, syncope, speech difficulty, weakness, numbness and headaches.   Hematological: Negative for adenopathy.   Psychiatric/Behavioral: Negative for agitation, behavioral problems, confusion and sleep " disturbance.     Objective:     Vital Signs (Most Recent):  Temp: 98.9 °F (37.2 °C) (10/24/20 0730)  Pulse: 91 (10/24/20 1030)  Resp: (!) 22 (10/24/20 1030)  BP: 107/71 (10/24/20 1030)  SpO2: 98 % (10/24/20 1030) Vital Signs (24h Range):  Temp:  [98 °F (36.7 °C)-98.9 °F (37.2 °C)] 98.9 °F (37.2 °C)  Pulse:  [] 91  Resp:  [17-40] 22  SpO2:  [71 %-100 %] 98 %  BP: ()/(59-91) 107/71     Weight: 83.5 kg (184 lb 1.4 oz)  Body mass index is 25.67 kg/m².    Intake/Output Summary (Last 24 hours) at 10/24/2020 1052  Last data filed at 10/24/2020 1038  Gross per 24 hour   Intake 3082.77 ml   Output 1725 ml   Net 1357.77 ml      Physical Exam  Vitals signs and nursing note reviewed.   Constitutional:       General: He is not in acute distress.     Appearance: He is ill-appearing. He is not diaphoretic.   HENT:      Head: Normocephalic and atraumatic.      Mouth/Throat:      Mouth: Mucous membranes are dry.   Eyes:      General: No scleral icterus.        Right eye: No discharge.         Left eye: No discharge.      Pupils: Pupils are equal, round, and reactive to light.   Neck:      Vascular: No JVD.   Cardiovascular:      Rate and Rhythm: Normal rate and regular rhythm.   Pulmonary:      Effort: Pulmonary effort is normal.      Breath sounds: Normal breath sounds.   Abdominal:      General: Bowel sounds are normal. There is no distension.      Palpations: Abdomen is soft.      Tenderness: There is abdominal tenderness in the left upper quadrant and left lower quadrant. There is no rebound.   Musculoskeletal:      Right lower leg: No edema.      Left lower leg: No edema.   Skin:     General: Skin is warm.      Capillary Refill: Capillary refill takes less than 2 seconds.      Findings: No rash.   Neurological:      General: No focal deficit present.      Mental Status: He is alert.      Cranial Nerves: No cranial nerve deficit.   Psychiatric:         Mood and Affect: Mood normal.         Significant Labs:   BMP:    Recent Labs   Lab 10/24/20  0350      *   K 4.3      CO2 13*   BUN 44*   CREATININE 2.5*   CALCIUM 7.9*     CBC:   Recent Labs   Lab 10/23/20  0344 10/24/20  0351   WBC 9.40 11.71   HGB 9.4* 10.7*   HCT 28.1* 32.5*   * 186       Significant Imaging: I have reviewed all pertinent imaging results/findings within the past 24 hours.      Assessment/Plan:      * Severe sepsis  Source is likely colitis from C.difficile.  Currently off pressors  Map appears to be above 70.  Will continue oral vancomycin.    Lactate level is ok.  Microbiology Results (last 7 days)     Procedure Component Value Units Date/Time    Urine Culture High Risk [[id number]] Collected: 10/20/20 0022    Order Status: Completed Specimen: Urine, Clean Catch Updated: 10/21/20 1928     Urine Culture, Routine No growth    Narrative:      Indicated criteria for high risk culture:->Other  Other (specify):->Neutropenic patient    Blood culture x two cultures. Draw prior to antibiotics. [[id number]] Collected: 10/15/20 1626    Order Status: Completed Specimen: Blood from Peripheral, Right Hand Updated: 10/21/20 0612     Blood Culture, Routine No growth after 5 days.    Narrative:      Aerobic and anaerobic    Blood culture x two cultures. Draw prior to antibiotics. [[id number]] Collected: 10/15/20 1546    Order Status: Completed Specimen: Blood from Antecubital, Right Arm Updated: 10/21/20 0612     Blood Culture, Routine No growth after 5 days.    Narrative:      Aerobic and anaerobic            Acute renal failure superimposed on stage 3 chronic kidney disease  Creatinine trending down   BMP reviewed- noted Estimated Creatinine Clearance: 29.7 mL/min (A) (based on SCr of 2.5 mg/dL (H)). according to latest data.   Monitor UOP and serial BMP and adjust therapy as needed. Renally dose meds.  Results for TANG LAUREN (MRN [id number]) as of 10/24/2020 16:35   Ref. Range 10/23/2020 03:44 10/24/2020 03:50   BUN, Bld Latest Ref  Range: 8 - 23 mg/dL 37 (H) 44 (H)   Creatinine Latest Ref Range: 0.5 - 1.4 mg/dL 2.8 (H) 2.5 (H)         Sepsis  Improved  Will continue Vanc PO and metro    Iron deficiency  History noted  Will follow Hematology recommendation      Moderate malnutrition  Nutrition consulted. Body mass index is 25.67 kg/m².. Encourage maximal PO intake. Diet supplementation ordered per nutrition approval. Will encourage PO and monitor closely for weight changes.      Anemia in stage 3 chronic kidney disease  Patient's anemia is currently controlled. Has not received any PRBCs to date.. Etiology likely d/t  anemia of chronic disease  Current CBC reviewed-   Lab Results   Component Value Date    HGB 10.7 (L) 10/24/2020    HCT 32.5 (L) 10/24/2020     Monitor serial CBC and transfuse if patient becomes hemodynamically unstable, symptomatic or H/H drops below 7/21.         Anemia due to chemotherapy  Patient's anemia is currently controlled. Has not received any PRBCs to date.. Etiology likely d/t chemo  Current CBC reviewed-   Lab Results   Component Value Date    HGB 10.7 (L) 10/24/2020    HCT 32.5 (L) 10/24/2020     Monitor serial CBC and transfuse if patient becomes hemodynamically unstable, symptomatic or H/H drops below 7/21.         Hypokalemia due to excessive gastrointestinal loss of potassium  Resolved Give supplement and monitor level.    Potassium   Date Value Ref Range Status   10/24/2020 4.3 3.5 - 5.1 mmol/L Final   10/23/2020 3.9 3.5 - 5.1 mmol/L Final         Febrile neutropenia  Improved  Monitor leukocyte count.  Hematology on the case  Granix given today.;    WBC   Date Value Ref Range Status   10/24/2020 11.71 3.90 - 12.70 K/uL Final       Post-transplant lymphoproliferative disorder (PTLD)  Has been receiving chemo for this.      -donor kidney transplant  Continue Prograf at usual dose.      Acquired hypothyroidism  Continue levothyroxine.    Metabolic acidosis  Acidosis persistent  Results for TANG LAUREN  XIN SANCHEZ (MRN [id number]) as of 10/24/2020 16:35   Ref. Range 10/24/2020 03:50   CO2 Latest Ref Range: 23 - 29 mmol/L 13 (L)   Anion Gap Latest Ref Range: 8 - 16 mmol/L 10      Was most likely secondary to diarrhea  Nephrology on the case  Will continue sodium bicarb      VTE Risk Mitigation (From admission, onward)         Ordered     IP VTE LOW RISK PATIENT  Once      10/15/20 2220                Discharge Planning   TRINH:      Code Status: Full Code   Is the patient medically ready for discharge?:     Reason for patient still in hospital (select all that apply): Patient trending condition and Treatment  Discharge Plan A: Home with family            Critical care time spent on the evaluation and treatment of severe organ dysfunction, review of pertinent labs and imaging studies, discussions with consulting providers and discussions with patient/family: 60 minutes.      Julia Summers MD  Department of Hospital Medicine   [facility]

## 2020-10-24 NOTE — SUBJECTIVE & OBJECTIVE
Interval History: Continues to have poor appetite. States that he feels  Much better. Vitals stable.     Review of Systems   Constitutional: Positive for fatigue. Negative for appetite change, chills and fever.   HENT: Negative for congestion, hearing loss, rhinorrhea, sore throat, trouble swallowing and voice change.    Respiratory: Negative for cough, chest tightness, shortness of breath and wheezing.    Cardiovascular: Negative for chest pain, palpitations and leg swelling.   Gastrointestinal: Positive for abdominal pain and diarrhea. Negative for blood in stool, nausea and vomiting.   Genitourinary: Negative for difficulty urinating, frequency, hematuria and urgency.   Musculoskeletal: Negative for back pain, joint swelling and neck stiffness.   Skin: Negative for pallor and rash.   Neurological: Negative for tremors, seizures, syncope, speech difficulty, weakness, numbness and headaches.   Hematological: Negative for adenopathy.   Psychiatric/Behavioral: Negative for agitation, behavioral problems, confusion and sleep disturbance.     Objective:     Vital Signs (Most Recent):  Temp: 98.9 °F (37.2 °C) (10/24/20 0730)  Pulse: 91 (10/24/20 1030)  Resp: (!) 22 (10/24/20 1030)  BP: 107/71 (10/24/20 1030)  SpO2: 98 % (10/24/20 1030) Vital Signs (24h Range):  Temp:  [98 °F (36.7 °C)-98.9 °F (37.2 °C)] 98.9 °F (37.2 °C)  Pulse:  [] 91  Resp:  [17-40] 22  SpO2:  [71 %-100 %] 98 %  BP: ()/(59-91) 107/71     Weight: 83.5 kg (184 lb 1.4 oz)  Body mass index is 25.67 kg/m².    Intake/Output Summary (Last 24 hours) at 10/24/2020 1052  Last data filed at 10/24/2020 1038  Gross per 24 hour   Intake 3082.77 ml   Output 1725 ml   Net 1357.77 ml      Physical Exam  Vitals signs and nursing note reviewed.   Constitutional:       General: He is not in acute distress.     Appearance: He is ill-appearing. He is not diaphoretic.   HENT:      Head: Normocephalic and atraumatic.      Mouth/Throat:      Mouth: Mucous  membranes are dry.   Eyes:      General: No scleral icterus.        Right eye: No discharge.         Left eye: No discharge.      Pupils: Pupils are equal, round, and reactive to light.   Neck:      Vascular: No JVD.   Cardiovascular:      Rate and Rhythm: Normal rate and regular rhythm.   Pulmonary:      Effort: Pulmonary effort is normal.      Breath sounds: Normal breath sounds.   Abdominal:      General: Bowel sounds are normal. There is no distension.      Palpations: Abdomen is soft.      Tenderness: There is abdominal tenderness in the left upper quadrant and left lower quadrant. There is no rebound.   Musculoskeletal:      Right lower leg: No edema.      Left lower leg: No edema.   Skin:     General: Skin is warm.      Capillary Refill: Capillary refill takes less than 2 seconds.      Findings: No rash.   Neurological:      General: No focal deficit present.      Mental Status: He is alert.      Cranial Nerves: No cranial nerve deficit.   Psychiatric:         Mood and Affect: Mood normal.         Significant Labs:   BMP:   Recent Labs   Lab 10/24/20  0350      *   K 4.3      CO2 13*   BUN 44*   CREATININE 2.5*   CALCIUM 7.9*     CBC:   Recent Labs   Lab 10/23/20  0344 10/24/20  0351   WBC 9.40 11.71   HGB 9.4* 10.7*   HCT 28.1* 32.5*   * 186       Significant Imaging: I have reviewed all pertinent imaging results/findings within the past 24 hours.

## 2020-10-24 NOTE — CARE UPDATE
10/24/20 0900   Patient Assessment/Suction   Level of Consciousness (AVPU) alert   Respiratory Effort Normal;Unlabored   All Lung Fields Breath Sounds coarse;diminished   PRE-TX-O2   O2 Device (Oxygen Therapy) room air   SpO2 99 %   Pulse Oximetry Type Continuous   $ Pulse Oximetry - Multiple Charge Pulse Oximetry - Multiple   Pulse 101   Resp (!) 25   /81   Aerosol Therapy   $ Aerosol Therapy Charges PRN treatment not required   Respiratory Treatment Status (SVN) PRN treatment not required

## 2020-10-24 NOTE — NURSING
Wife called asking about if pt was eating or if he had been seen by PT. Explained pt had not eaten yet and PT had not seen pt. Wife expressed concerns that pt may be too sleepy to eat and is requesting a new appetite stimulant. Addressed with MD. TPN to be stopped.

## 2020-10-24 NOTE — PLAN OF CARE
Pt's brother came by this evening. He brought pt tuna fish salad. Brother able to get pt to get out of bed to sit in chair. Left PAC saline locked. Report called to Josee on PCU. Pt remains on contact isolation for c-diff. Pt has had 3 mucoid BM's today ranging from brown with red streaks to just clear brown. Wife called to get an update. After password given, update was given to wife about pt going to PCU, not eating, and no PT today. Wife stated she would bring pt some egg drop soup. Denies pain.

## 2020-10-24 NOTE — ASSESSMENT & PLAN NOTE
Nutrition consulted. Body mass index is 25.67 kg/m².. Encourage maximal PO intake. Diet supplementation ordered per nutrition approval. Will encourage PO and monitor closely for weight changes.

## 2020-10-24 NOTE — PROGRESS NOTES
"[facility] Medicine  Progress Note    Patient Name: Alin Burkett  MRN: [id number]  Patient Class: IP- Inpatient   Admission Date: 10/15/2020  Length of Stay: 9 days  Attending Physician: Julia Summers MD  Primary Care Provider: Carmen Krueger MD        Subjective:     Principal Problem:Severe sepsis        HPI:  Patient has been having diarrhea with "jelly-like" consistency as well as crampiness in the abdomen.  Symptoms began roughly one week ago.  Also has had fever and malaise.  Appetite poor.  Fatigue as well.  He has been receiving chemo for PTLD; most recent cycle of CHOP was end of last week.  He has history of renal transplant four years ago and is currently on twice-a-day  Prograf.  He's had no cough, no unusual headache, no sinus pain, and no burning on urination.  He feels that he's probably dehydrated.    Overview/Hospital Course:  No notes on file    Interval History:  Patient currently on midodrine diarrhea improved abdominal pain improved symptoms improved.  Off pressors afebrile    Review of Systems   Constitutional: Positive for fatigue. Negative for appetite change, chills and fever.   HENT: Negative for congestion, hearing loss, rhinorrhea, sore throat, trouble swallowing and voice change.    Respiratory: Negative for cough, chest tightness, shortness of breath and wheezing.    Cardiovascular: Negative for chest pain, palpitations and leg swelling.   Gastrointestinal: Positive for abdominal pain and diarrhea. Negative for blood in stool, nausea and vomiting.   Genitourinary: Negative for difficulty urinating, frequency, hematuria and urgency.   Musculoskeletal: Negative for back pain, joint swelling and neck stiffness.   Skin: Negative for pallor and rash.   Neurological: Negative for tremors, seizures, syncope, speech difficulty, weakness, numbness and headaches.   Hematological: Negative for adenopathy.   Psychiatric/Behavioral: Negative for agitation, " behavioral problems, confusion and sleep disturbance.     Objective:     Vital Signs (Most Recent):  Temp: 98.9 °F (37.2 °C) (10/24/20 0730)  Pulse: 101 (10/24/20 0900)  Resp: (!) 25 (10/24/20 0900)  BP: 125/81 (10/24/20 0900)  SpO2: 99 % (10/24/20 0900) Vital Signs (24h Range):  Temp:  [98 °F (36.7 °C)-98.9 °F (37.2 °C)] 98.9 °F (37.2 °C)  Pulse:  [] 101  Resp:  [17-40] 25  SpO2:  [71 %-100 %] 99 %  BP: ()/(59-91) 125/81     Weight: 83.5 kg (184 lb 1.4 oz)  Body mass index is 25.67 kg/m².    Intake/Output Summary (Last 24 hours) at 10/24/2020 1032  Last data filed at 10/24/2020 0745  Gross per 24 hour   Intake 2986.4 ml   Output 1725 ml   Net 1261.4 ml      Physical Exam  Vitals signs and nursing note reviewed.   Constitutional:       General: He is not in acute distress.     Appearance: He is ill-appearing. He is not diaphoretic.   HENT:      Head: Normocephalic and atraumatic.      Mouth/Throat:      Mouth: Mucous membranes are dry.   Eyes:      General: No scleral icterus.        Right eye: No discharge.         Left eye: No discharge.      Pupils: Pupils are equal, round, and reactive to light.   Neck:      Vascular: No JVD.   Cardiovascular:      Rate and Rhythm: Normal rate and regular rhythm.   Pulmonary:      Effort: Pulmonary effort is normal.      Breath sounds: Normal breath sounds.   Abdominal:      General: Bowel sounds are normal. There is no distension.      Palpations: Abdomen is soft.      Tenderness: There is abdominal tenderness in the left upper quadrant and left lower quadrant. There is no rebound.   Musculoskeletal:      Right lower leg: No edema.      Left lower leg: No edema.   Skin:     General: Skin is warm.      Capillary Refill: Capillary refill takes less than 2 seconds.      Findings: No rash.   Neurological:      General: No focal deficit present.      Mental Status: He is alert.      Cranial Nerves: No cranial nerve deficit.   Psychiatric:         Mood and Affect: Mood  normal.         Significant Labs:   BMP:   Recent Labs   Lab 10/24/20  0350      *   K 4.3      CO2 13*   BUN 44*   CREATININE 2.5*   CALCIUM 7.9*     CBC:   Recent Labs   Lab 10/23/20  0344 10/24/20  0351   WBC 9.40 11.71   HGB 9.4* 10.7*   HCT 28.1* 32.5*   * 186       Significant Imaging: I have reviewed all pertinent imaging results/findings within the past 24 hours.      Assessment/Plan:      * Severe sepsis  Source is likely colitis from C.difficile.  Currently off pressors  Map appears to be above 70.  Will continue oral vancomycin.    Lactate level is ok.  Microbiology Results (last 7 days)     Procedure Component Value Units Date/Time    Urine Culture High Risk [[id number]] Collected: 10/20/20 0022    Order Status: Completed Specimen: Urine, Clean Catch Updated: 10/21/20 1928     Urine Culture, Routine No growth    Narrative:      Indicated criteria for high risk culture:->Other  Other (specify):->Neutropenic patient    Blood culture x two cultures. Draw prior to antibiotics. [[id number]] Collected: 10/15/20 1626    Order Status: Completed Specimen: Blood from Peripheral, Right Hand Updated: 10/21/20 0612     Blood Culture, Routine No growth after 5 days.    Narrative:      Aerobic and anaerobic    Blood culture x two cultures. Draw prior to antibiotics. [[id number]] Collected: 10/15/20 1546    Order Status: Completed Specimen: Blood from Antecubital, Right Arm Updated: 10/21/20 0612     Blood Culture, Routine No growth after 5 days.    Narrative:      Aerobic and anaerobic            Acute renal failure superimposed on stage 3 chronic kidney disease  Creatinine trending up   BMP reviewed- noted Estimated Creatinine Clearance: 29.7 mL/min (A) (based on SCr of 2.5 mg/dL (H)). according to latest data.   Monitor UOP and serial BMP and adjust therapy as needed. Renally dose meds.      Iron deficiency  History noted  Will follow Hematology recommendation      Moderate  malnutrition  Nutrition consulted. Body mass index is 25.67 kg/m².. Encourage maximal PO intake. Diet supplementation ordered per nutrition approval. Will encourage PO and monitor closely for weight changes.      Anemia in stage 3 chronic kidney disease  Patient's anemia is currently controlled. Has not received any PRBCs to date.. Etiology likely d/t  anemia of chronic disease  Current CBC reviewed-   Lab Results   Component Value Date    HGB 10.7 (L) 10/24/2020    HCT 32.5 (L) 10/24/2020     Monitor serial CBC and transfuse if patient becomes hemodynamically unstable, symptomatic or H/H drops below 7/21.         Anemia due to chemotherapy  Patient's anemia is currently controlled. Has not received any PRBCs to date.. Etiology likely d/t chemo  Current CBC reviewed-   Lab Results   Component Value Date    HGB 10.7 (L) 10/24/2020    HCT 32.5 (L) 10/24/2020     Monitor serial CBC and transfuse if patient becomes hemodynamically unstable, symptomatic or H/H drops below 7/21.         Hypokalemia due to excessive gastrointestinal loss of potassium  Resolved Give supplement and monitor level.    Potassium   Date Value Ref Range Status   10/24/2020 4.3 3.5 - 5.1 mmol/L Final   10/23/2020 3.9 3.5 - 5.1 mmol/L Final         Febrile neutropenia  Improved  Monitor leukocyte count.  Hematology on the case  Granix given today.;    WBC   Date Value Ref Range Status   10/24/2020 11.71 3.90 - 12.70 K/uL Final       Post-transplant lymphoproliferative disorder (PTLD)  Has been receiving chemo for this.      -donor kidney transplant  Continue Prograf at usual dose.      Acquired hypothyroidism  Continue levothyroxine.    Metabolic acidosis  Acidosis improved   Was most likely secondary to diarrhea  Nephrology on the case  Will continue sodium bicarb      VTE Risk Mitigation (From admission, onward)         Ordered     IP VTE LOW RISK PATIENT  Once      10/15/20 2220                Discharge Planning   TRINH:      Code Status:  Full Code   Is the patient medically ready for discharge?:     Reason for patient still in hospital (select all that apply): Patient trending condition and Treatment  Discharge Plan A: Home with family            Critical care time spent on the evaluation and treatment of severe organ dysfunction, review of pertinent labs and imaging studies, discussions with consulting providers and discussions with patient/family: 60 minutes.      Julia Summers MD  Department of Hospital Medicine   [facility]

## 2020-10-24 NOTE — ASSESSMENT & PLAN NOTE
Will treat empirically with  Vancomycin.  Improved  Monitor leukocyte count.  Hematology on the case  Granix given today.;    WBC   Date Value Ref Range Status   10/24/2020 11.71 3.90 - 12.70 K/uL Final

## 2020-10-24 NOTE — ASSESSMENT & PLAN NOTE
Patient's anemia is currently controlled. Has not received any PRBCs to date.. Etiology likely d/t  anemia of chronic disease  Current CBC reviewed-   Lab Results   Component Value Date    HGB 10.7 (L) 10/24/2020    HCT 32.5 (L) 10/24/2020     Monitor serial CBC and transfuse if patient becomes hemodynamically unstable, symptomatic or H/H drops below 7/21.

## 2020-10-24 NOTE — ASSESSMENT & PLAN NOTE
Resolved Give supplement and monitor level.    Potassium   Date Value Ref Range Status   10/24/2020 4.3 3.5 - 5.1 mmol/L Final   10/23/2020 3.9 3.5 - 5.1 mmol/L Final

## 2020-10-24 NOTE — RESPIRATORY THERAPY
10/23/20 2003   Patient Assessment/Suction   Level of Consciousness (AVPU) responds to voice   Respiratory Effort Normal;Unlabored   Expansion/Accessory Muscles/Retractions expansion symmetric;no retractions;no use of accessory muscles   PRE-TX-O2   O2 Device (Oxygen Therapy) room air   SpO2 99 %   Pulse Oximetry Type Continuous   Pulse 82   Resp 20   /73   Aerosol Therapy   $ Aerosol Therapy Charges PRN treatment not required   Respiratory Treatment Status (SVN) PRN treatment not required

## 2020-10-24 NOTE — ASSESSMENT & PLAN NOTE
Creatinine trending up   BMP reviewed- noted Estimated Creatinine Clearance: 29.7 mL/min (A) (based on SCr of 2.5 mg/dL (H)). according to latest data.   Monitor UOP and serial BMP and adjust therapy as needed. Renally dose meds.

## 2020-10-24 NOTE — PROGRESS NOTES
"Progress Note  Infectious Disease    Reason for Consult:  C difficile colitis, neutropenia, sepsis    HPI: Alin Burkett is a   70 y.o. male who is status post renal transplant and is receiving chemotherapy for diffuse large B-cell lymphoma, presented to the emergency room on 10/15 with worsening of chronic diarrhea, abdominal cramps of 1 weeks duration.  He was found to be neutropenic with a white blood cell count of 0.2, volume depleted, hypokalemic, was cultured and given fluid resuscitation and vancomycin and cefepime.  He was admitted to the intensive care unit. He was recently given cefpodoxime to take prophylactically associated with his chemotherapy for recurring urinary tract infections.  Most recent positive urine culture was September 18th with E coli sensitive to 3rd generation cephalosporins carbapenems  He has had a hectic fever, stool for C difficile toxin was obtained and was resulted as positive today.  he has had a positive C difficile test before, August 2019.  White blood cells remain depressed at 0.1, platelets are depressed at 91,000, creatinine 1.6.  CT scan of the abdomen obtained last night shows severe proctocolitis without megacolon. Neupogen has been ordered. He is discouraged from eating by severe cramps.      10/17/2020 tmax is improved. Continues to be neutropenic Continues to have cramping, intermittent, abdominal pain. + diarrhea "lost count how many" He had refused vancomycin in am but decided to take it now  10/18/2020 afebrile,(103 on 16th)  ANC improved on granix 0---1029) he is feeling much better today.  Less abdominal pain.  Less loose stools.  10/19/2020.  Afebrile.  T-max 99°.  Less need for pressors.  WBC 5.  Creatinine slightly worse at 1.9.  Discussed with nurse.  Patient may have had some hallucinations earlier  10/20/2020.  Afebrile. Less abdominal pain. Less diarrhea. He feels his abdomen is still distended and does not allow him to take a deep breath. KUB  Is read " as below, I feel that transvere colon is a little bigger today. Creatinine has worsened. Bicarb is 9. Nephrologist is giving bicarb drip.  Las Vancomycin iv and cefepime were  given on 18   Tolerating vancomycin by mouth and metronidazole    10/21: interim reviewed d/w Dr. Jiménez. Requiring bicarb drip. WBC 14 after neupogen, platelets better. Cr stable. Severe hypoalbuminemia. KUB not ominous, CXR with blunted left CPA. Wife reports he is sleeping all of the time and not eating. I aroused him and he agreed to eat some pudding  10/22: interim reviewed. Renal function slightly worse, bicarb corrected to 18 on drip. Urine output is poor and incontinent, midodrine started. Not eating.   10/23:  Interim reviewed, afebrile, oxygenating normally, off norepinephrine since administration of my do drain.  Stools becoming more solid, 2 measured since 0 700, still having incontinence.  Able to do some physical therapy exercises but physical therapy recommends LTAC/rehab/SNF.  Urine output is good, creatinine has stabilized 2.8, white blood cells normal.  CO2 still low at 15.  No new imaging. Slept during visit, discussed with RN and wife.   10/24:  Afebrile, oxygenating well, sleeping and not eating, urine output is very good, creatinine improved, stools with improving form    Antibiotics (From admission, onward)    Start     Stop Route Frequency Ordered    10/23/20 1800  vancomycin 25 mg/mL oral solution 125 mg  (C. difficile Infection (CDI) Treatment Order Panel)      11/06 1759 Oral Every 6 hours 10/23/20 1623    10/16/20 1900  metronidazole IVPB 500 mg      -- IV Every 8 hours (non-standard times) 10/16/20 1755             EXAM & DIAGNOSTICS REVIEWED:   Vitals:     Temp:  [98 °F (36.7 °C)-98.9 °F (37.2 °C)]   Temp: 98 °F (36.7 °C) (10/24/20 1200)  Pulse: 90 (10/24/20 1400)  Resp: (!) 36 (10/24/20 1400)  BP: (!) 136/97 (10/24/20 1400)  SpO2: 98 % (10/24/20 1400)    Intake/Output Summary (Last 24 hours) at 10/24/2020  1501  Last data filed at 10/24/2020 1432  Gross per 24 hour   Intake 2842.77 ml   Output 1650 ml   Net 1192.77 ml       General:  In NAD. Visit with brother  Eyes:   anicteric, EOMI  ENT:   no oral lesion  Neck:  Supple   Lungs: Clear, no consolidation, rales, wheezes, rub  Heart:  RRR, no gallop/murmur/rub noted  Abd:  Mild distention, active BS, no masses or organomegaly appreciated.    :  Voids/  Incontinent,  no flank tenderness  Musc:  Joints without effusion, swelling, erythema, synovitis,    Skin:  No rashes.    Wound:   Neuro: Alert, conversant, non focal.    Psych:    pleasant     Extrem: generalized edema, no erythema, phlebitis, cellulitis, warm and well perfused  VAD:  portacath     Isolation:  Special contact    Lines/Tubes/Drains:    General Labs reviewed:  Recent Labs   Lab 10/22/20  0411 10/23/20  0344 10/24/20  0351   WBC 12.13 9.40 11.71   HGB 10.3* 9.4* 10.7*   HCT 30.5* 28.1* 32.5*   * 134* 186       Recent Labs   Lab 10/20/20  2352  10/22/20  0411 10/23/20  0344 10/24/20  0350   *   < > 135* 133* 132*   K 4.3   < > 4.2 3.9 4.3   *   < > 111* 110 109   CO2 12*   < > 18* 15* 13*   BUN 37*   < > 34* 37* 44*   CREATININE 2.5*   < > 2.8* 2.8* 2.5*   CALCIUM 7.9*   < > 7.8* 7.6* 7.9*   PROT 4.2*  --   --   --   --    BILITOT 0.5  --   --   --   --    ALKPHOS 154*  --   --   --   --    ALT 18  --   --   --   --    AST 21  --   --   --   --     < > = values in this interval not displayed.     Micro:  Microbiology Results (last 7 days)     Procedure Component Value Units Date/Time    Urine Culture High Risk [[id number]] Collected: 10/20/20 0022    Order Status: Completed Specimen: Urine, Clean Catch Updated: 10/21/20 1928     Urine Culture, Routine No growth    Narrative:      Indicated criteria for high risk culture:->Other  Other (specify):->Neutropenic patient    Blood culture x two cultures. Draw prior to antibiotics. [[id number]] Collected: 10/15/20 8039    Order Status: Completed  Specimen: Blood from Peripheral, Right Hand Updated: 10/21/20 0612     Blood Culture, Routine No growth after 5 days.    Narrative:      Aerobic and anaerobic    Blood culture x two cultures. Draw prior to antibiotics. [[id number]] Collected: 10/15/20 1546    Order Status: Completed Specimen: Blood from Antecubital, Right Arm Updated: 10/21/20 0612     Blood Culture, Routine No growth after 5 days.    Narrative:      Aerobic and anaerobic        Imaging Reviewed:   KUBThree round radiodensities overlie the left upper quadrant may represent ingested pills or be in the patient's clothing.  A radiodense thread is noted over the right lower quadrant of uncertain significance.   CXR clear   CT abdomen and pelvis 10/16 :    Findings consistent with severe proctocolitis.    Transplanted kidney right side of the pelvis with mild pelvocaliectasis and perinephric stranding nonspecific.  No calcified stones seen Additional findings as detailed above including cystic lesion with in the native right kidney versus dilatation of collecting structure of the native right kidney.  Stones in the native right kidney.  Cystic lesion within the pancreas  Cardiology:    IMPRESSION & PLAN   1. Severe C. Difficile colitis, improved   Prompted by oral antibiotics and/or chemotherapy   History of Cdiff 8/2019   Metabolic acidosis, requiring IV bicarb    2. PTLD, EBV related lymphoma, receiving chemotherapy      neutropenia, resolved  3. S/p renal transplant on immunosuppression, [facility], Cr 2. 5, improving  4. Chronic diarrhea  5. History of recurrent UTIs      Recommendations:     IV flagyl and   oral vancomyin   125 mg Q 6 for Cdiff for least another 2 weeks    cholestyramine and probiotic   Needs aggressive nutritional support .  Wife concerned that he is too sedated.  I reduced the Marinol 2.5 b.i.d.   out of bed as able  ? Move out of ICU  Would qualify for LTAC

## 2020-10-24 NOTE — ASSESSMENT & PLAN NOTE
Source is likely colitis from C.difficile.  Currently off pressors  Map appears to be above 70.  Will continue oral vancomycin.    Lactate level is ok.  Microbiology Results (last 7 days)     Procedure Component Value Units Date/Time    Urine Culture High Risk [[id number]] Collected: 10/20/20 0022    Order Status: Completed Specimen: Urine, Clean Catch Updated: 10/21/20 1928     Urine Culture, Routine No growth    Narrative:      Indicated criteria for high risk culture:->Other  Other (specify):->Neutropenic patient    Blood culture x two cultures. Draw prior to antibiotics. [[id number]] Collected: 10/15/20 1626    Order Status: Completed Specimen: Blood from Peripheral, Right Hand Updated: 10/21/20 0612     Blood Culture, Routine No growth after 5 days.    Narrative:      Aerobic and anaerobic    Blood culture x two cultures. Draw prior to antibiotics. [[id number]] Collected: 10/15/20 1546    Order Status: Completed Specimen: Blood from Antecubital, Right Arm Updated: 10/21/20 0612     Blood Culture, Routine No growth after 5 days.    Narrative:      Aerobic and anaerobic

## 2020-10-24 NOTE — SUBJECTIVE & OBJECTIVE
Interval History:  Patient currently on midodrine diarrhea improved abdominal pain improved symptoms improved.  Off pressors afebrile    Review of Systems   Constitutional: Positive for fatigue. Negative for appetite change, chills and fever.   HENT: Negative for congestion, hearing loss, rhinorrhea, sore throat, trouble swallowing and voice change.    Respiratory: Negative for cough, chest tightness, shortness of breath and wheezing.    Cardiovascular: Negative for chest pain, palpitations and leg swelling.   Gastrointestinal: Positive for abdominal pain and diarrhea. Negative for blood in stool, nausea and vomiting.   Genitourinary: Negative for difficulty urinating, frequency, hematuria and urgency.   Musculoskeletal: Negative for back pain, joint swelling and neck stiffness.   Skin: Negative for pallor and rash.   Neurological: Negative for tremors, seizures, syncope, speech difficulty, weakness, numbness and headaches.   Hematological: Negative for adenopathy.   Psychiatric/Behavioral: Negative for agitation, behavioral problems, confusion and sleep disturbance.     Objective:     Vital Signs (Most Recent):  Temp: 98.9 °F (37.2 °C) (10/24/20 0730)  Pulse: 101 (10/24/20 0900)  Resp: (!) 25 (10/24/20 0900)  BP: 125/81 (10/24/20 0900)  SpO2: 99 % (10/24/20 0900) Vital Signs (24h Range):  Temp:  [98 °F (36.7 °C)-98.9 °F (37.2 °C)] 98.9 °F (37.2 °C)  Pulse:  [] 101  Resp:  [17-40] 25  SpO2:  [71 %-100 %] 99 %  BP: ()/(59-91) 125/81     Weight: 83.5 kg (184 lb 1.4 oz)  Body mass index is 25.67 kg/m².    Intake/Output Summary (Last 24 hours) at 10/24/2020 1032  Last data filed at 10/24/2020 0745  Gross per 24 hour   Intake 2986.4 ml   Output 1725 ml   Net 1261.4 ml      Physical Exam  Vitals signs and nursing note reviewed.   Constitutional:       General: He is not in acute distress.     Appearance: He is ill-appearing. He is not diaphoretic.   HENT:      Head: Normocephalic and atraumatic.       Mouth/Throat:      Mouth: Mucous membranes are dry.   Eyes:      General: No scleral icterus.        Right eye: No discharge.         Left eye: No discharge.      Pupils: Pupils are equal, round, and reactive to light.   Neck:      Vascular: No JVD.   Cardiovascular:      Rate and Rhythm: Normal rate and regular rhythm.   Pulmonary:      Effort: Pulmonary effort is normal.      Breath sounds: Normal breath sounds.   Abdominal:      General: Bowel sounds are normal. There is no distension.      Palpations: Abdomen is soft.      Tenderness: There is abdominal tenderness in the left upper quadrant and left lower quadrant. There is no rebound.   Musculoskeletal:      Right lower leg: No edema.      Left lower leg: No edema.   Skin:     General: Skin is warm.      Capillary Refill: Capillary refill takes less than 2 seconds.      Findings: No rash.   Neurological:      General: No focal deficit present.      Mental Status: He is alert.      Cranial Nerves: No cranial nerve deficit.   Psychiatric:         Mood and Affect: Mood normal.         Significant Labs:   BMP:   Recent Labs   Lab 10/24/20  0350      *   K 4.3      CO2 13*   BUN 44*   CREATININE 2.5*   CALCIUM 7.9*     CBC:   Recent Labs   Lab 10/23/20  0344 10/24/20  0351   WBC 9.40 11.71   HGB 9.4* 10.7*   HCT 28.1* 32.5*   * 186       Significant Imaging: I have reviewed all pertinent imaging results/findings within the past 24 hours.

## 2020-10-24 NOTE — ASSESSMENT & PLAN NOTE
Improved  Monitor leukocyte count.  Hematology on the case  Granix given today.;    WBC   Date Value Ref Range Status   10/24/2020 11.71 3.90 - 12.70 K/uL Final

## 2020-10-25 PROBLEM — A04.72 C. DIFFICILE COLITIS: Status: ACTIVE | Noted: 2020-01-01

## 2020-10-25 NOTE — PT/OT/SLP PROGRESS
Occupational Therapy   Treatment    Name: Alin Burkett  MRN: [id number]  Admitting Diagnosis:  C. difficile colitis       Recommendations:     Discharge Recommendations: nursing facility, skilled  Discharge Equipment Recommendations:     Barriers to discharge:  Decreased caregiver support    Assessment:     Alin Burkett is a 70 y.o. male with a medical diagnosis of C. difficile colitis.  He presents with performance deficits affecting function are weakness, impaired endurance, impaired self care skills, impaired functional mobilty, gait instability and impaired balance.     Rehab Prognosis:  Fair; patient would benefit from acute skilled OT services to address these deficits and reach maximum level of function.       Plan:     Patient to be seen 5 x/week to address the above listed problems via self-care/home management, therapeutic activities, therapeutic exercises  · Plan of Care Expires: 11/06/20  · Plan of Care Reviewed with: patient    Subjective     Pain/Comfort:  Pain Rating 1: 0/10    Objective:     Communicated with: nurse Coy prior to session.  Patient found supine with peripheral IV, telemetry upon OT entry to room.    General Precautions: Standard, fall, special contact     Occupational Performance:     Bed Mobility:    · Patient completed Rolling/Turning to Right with minimum assistance  · Patient completed Scooting/Bridging with minimum assistance  · Patient completed Supine to Sit with moderate assistance  · Patient completed Sit to Supine with moderate assistance       [facility] 6 Click ADL: 15    Treatment & Education:  Bed mobility training  Rolling to R side  Scooting  Supine <> sit     Seated EOB x 15 minutes with supervision    Patient left supine with all lines intact, call button in reach, bed alarm on and wife presentEducation:      GOALS:   Multidisciplinary Problems     Occupational Therapy Goals        Problem: Occupational Therapy Goal    Goal Priority Disciplines Outcome Interventions    Occupational Therapy Goal     OT, PT/OT Ongoing, Progressing    Description: Goals to be met by: 11/6/2020    Patient will increase functional independence with ADLs by performing:    UE Dressing with Set-up Assistance.  LE Dressing with Minimal Assistance.  Grooming while seated with Set-up Assistance.  Toileting from toilet with Minimal Assistance for hygiene and clothing management.   Sitting at edge of bed x 15 minutes with Supervision.  Toilet transfer to toilet with Minimal Assistance.                     Time Tracking:     OT Date of Treatment: 10/25/20  OT Start Time: 1555  OT Stop Time: 1620  OT Total Time (min): 25 min    Billable Minutes:Therapeutic Activity 25    Reema Headley, OTR  10/25/2020

## 2020-10-25 NOTE — ASSESSMENT & PLAN NOTE
Creatinine trending down  Possibly at [facility]  improving at this point   BMP reviewed- noted Estimated Creatinine Clearance: 32.3 mL/min (A) (based on SCr of 2.3 mg/dL (H)). according to latest data.   Monitor UOP and serial BMP and adjust therapy as needed. Renally dose meds.  Results for TANG LAUREN (MRN [id number]) as of 10/24/2020 16:35   Ref. Range 10/23/2020 03:44 10/24/2020 03:50   BUN, Bld Latest Ref Range: 8 - 23 mg/dL 37 (H) 44 (H)   Creatinine Latest Ref Range: 0.5 - 1.4 mg/dL 2.8 (H) 2.5 (H)

## 2020-10-25 NOTE — PT/OT/SLP PROGRESS
Physical Therapy Treatment    Patient Name:  Alin Burkett   MRN:  [id number]    Recommendations:     Discharge Recommendations:  nursing facility, skilled, rehabilitation facility, LTACH (long-term acute care hospital)   Discharge Equipment Recommendations: walker, rolling   Barriers to discharge: Decreased caregiver support    Assessment:     Alin Burkett is a 70 y.o. male admitted with a medical diagnosis of C. difficile colitis.  He presents with the following impairments/functional limitations:    .    Rehab Prognosis: Fair; patient would benefit from acute skilled PT services to address these deficits and reach maximum level of function.    Recent Surgery: * No surgery found *      Plan:     During this hospitalization, patient to be seen 6 x/week to address the identified rehab impairments via gait training, therapeutic activities, therapeutic exercises and progress toward the following goals:    · Plan of Care Expires:  11/30/20    Subjective     Chief Complaint: patient reports that every time they clean him up, he has another BM  Patient/Family Comments/goals: No other comments  Pain/Comfort:  · Location - Orientation 1: generalized      Objective:     Communicated with nursing prior to session.  Patient found supine with peripheral IV, telemetry upon PT entry to room.     General Precautions: Standard, special contact   Orthopedic Precautions:N/A   Braces:       Functional Mobility:  · Bed Mobility:     · Rolling Right: moderate assistance  · Supine to Sit: moderate assistance  · Transfers:     · Sit to Stand:  moderate assistance with rolling walker      AM-PAC 6 CLICK MOBILITY          Therapeutic Activities and Exercises:   patient t/f supine to sit and sit to stand with mod A using a FWW    Patient left supine with all lines intact, call button in reach and bed alarm on..    GOALS:   Multidisciplinary Problems     Physical Therapy Goals        Problem: Physical Therapy Goal    Goal Priority  Disciplines Outcome Goal Variances Interventions   Physical Therapy Goal     PT, PT/OT Ongoing, Progressing     Description: Goals to be met by: 2020     Patient will increase functional independence with mobility by performin. Supine to sit with MInimal Assistance  2. Sit to stand transfer with Minimal Assistance  3. Bed to chair transfer with Minimal Assistance using Rolling Walker  4. Gait  x 250 feet with Minimal Assistance using Rolling Walker.   5. Lower extremity exercise program x20 reps                      Time Tracking:     PT Received On: 10/25/20  PT Start Time: 940     PT Stop Time: 955  PT Total Time (min): 15 min     Billable Minutes: Therapeutic Activity 15    Treatment Type: Treatment  PT/PTA: PT     PTA Visit Number: 0     Naif Sun, PT  10/25/2020

## 2020-10-25 NOTE — PLAN OF CARE
Problem: Physical Therapy Goal  Goal: Physical Therapy Goal  Description: Goals to be met by: 2020     Patient will increase functional independence with mobility by performin. Supine to sit with MInimal Assistance  2. Sit to stand transfer with Minimal Assistance  3. Bed to chair transfer with Minimal Assistance using Rolling Walker  4. Gait  x 250 feet with Minimal Assistance using Rolling Walker.   5. Lower extremity exercise program x20 reps     Outcome: Ongoing, Progressing

## 2020-10-25 NOTE — PLAN OF CARE
Patient AOX4 resting in bed, appears asleep, eyes closed, respirations even and unlabored. NAD. Denies pain or SOB. VSS. Afebrile. Cardiac monitoring maintained. Afib. Up with assistance x1. Room air. Medications administered per MD/NP order. Incontinence care performed. Bed alarm active. Side Rails up X2. Plan of care reviewed with patient. Verbalizes understanding. Frequent checks performed Q2H. Call light in reach. Pt free from fall or injury. Will monitor.

## 2020-10-25 NOTE — ASSESSMENT & PLAN NOTE
Source is likely colitis from C.difficile.     Map appears to be above 70.  Will continue oral vancomycin and IV metronidazole  Patient condition improving at this point  As per infectious disease will benefit from LTAC placement and will need vancomycin and metronidazole for 2 weeks      Microbiology Results (last 7 days)     Procedure Component Value Units Date/Time    Urine Culture High Risk [[id number]] Collected: 10/20/20 0022    Order Status: Completed Specimen: Urine, Clean Catch Updated: 10/21/20 1928     Urine Culture, Routine No growth    Narrative:      Indicated criteria for high risk culture:->Other  Other (specify):->Neutropenic patient    Blood culture x two cultures. Draw prior to antibiotics. [[id number]] Collected: 10/15/20 1626    Order Status: Completed Specimen: Blood from Peripheral, Right Hand Updated: 10/21/20 0612     Blood Culture, Routine No growth after 5 days.    Narrative:      Aerobic and anaerobic    Blood culture x two cultures. Draw prior to antibiotics. [[id number]] Collected: 10/15/20 1546    Order Status: Completed Specimen: Blood from Antecubital, Right Arm Updated: 10/21/20 0612     Blood Culture, Routine No growth after 5 days.    Narrative:      Aerobic and anaerobic

## 2020-10-25 NOTE — PLAN OF CARE
Medications administered per orders  Assistance with elimination provided as needed  Abx administered per orders  Safe environment maintained

## 2020-10-25 NOTE — PROGRESS NOTES
" INPATIENT NEPHROLOGY PROGRESS  [facility] NEPHROLOGY    Alin Burkett  10/25/2020    Reason for consultation:    Acute kidney injury     Chief Complaint:   Chief Complaint   Patient presents with    Diarrhea     for the past 3 days,last received chemotherapy 6 days ago.     Abdominal Pain          History of Present Illness:    Per H and P    Patient has been having diarrhea with "jelly-like" consistency as well as crampiness in the abdomen.  Symptoms began roughly one week ago.  Also has had fever and malaise.  Appetite poor.  Fatigue as well.  He has been receiving chemo for PTLD; most recent cycle of CHOP was end of last week.  He has history of renal transplant four years ago and is currently on twice-a-day  Prograf.  He's had no cough, no unusual headache, no sinus pain, and no burning on urination.     10/20  Has some diarrhea and abdominal pain.  No nausea, chest pain, sob, new neuro symptoms, new joint pain.  He is very weak.    I told him that he had previously been seen by doctor Shonda and I would call him to see him.  He stated he didn't want to see Dr Merchant and requested that I become his nephrologist.    10/21  Not much change in renal function since yesterday. UOP 1.3L.  Sleeping quietly.  10/23  Sleeping.  1550 urine output.    10/24 VSS, no new complains. Appreciate ID and Heme input.  10/25 VSS, no new complains. sCr is better.    Plan of Care:    Acute kidney injury likely hemodynamically mediated, c.diff colitis  Kidney transplant  --urine sodium low implicating renal hypoperfusion   --avoid NSAIDS, Sales II inhibitors, and other non-essential nephrotoxic agents  --renal dose medication for crcl 10-50  --keep map above 55  --treat c.diff  --continue IS for kidney transplant.    Nongap metabolic acidosis likely combination of GI losses and renal tubular defect (urine pH inappropriately high)  - urine anion gap not accurate due to low urine sodium     PTLD lymphoma, s/p renal " transplant  --tacrolimus level therapeutic  --appreciate Heme input    Hyponatremia  --better  --avoid hypotonic iv piggy backs  --no ssri antidepressants or thiazide diuretics    Thank you for allowing us to participate in this patient's care. We will continue to follow.    Vital Signs:  Temp Readings from Last 3 Encounters:   10/25/20 96.7 °F (35.9 °C) (Oral)   10/10/20 98.3 °F (36.8 °C) (Oral)   10/09/20 98.6 °F (37 °C)       Pulse Readings from Last 3 Encounters:   10/25/20 96   10/10/20 95   10/09/20 (!) 56       BP Readings from Last 3 Encounters:   10/25/20 90/64   10/10/20 102/63   10/09/20 115/76       Weight:  Wt Readings from Last 3 Encounters:   10/24/20 83.5 kg (184 lb 1.4 oz)   10/10/20 68.5 kg (151 lb)   10/09/20 66.4 kg (146 lb 6.2 oz)       Past Medical & Surgical History:  Past Medical History:   Diagnosis Date    Acidosis     Adrenal adenoma     Anemia associated with chronic renal failure     Arrhythmia, onset 2015    Awaiting organ transplant status 2013    Basal cell carcinoma 2012    left nasal tip    Blood type B+ 2013    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  Renal allograft biopsy 17 (DIVINE): 21 glomeruli, none globally sclerosed, <5% interstitial fibrosis, no ACR, c4d negative, AVR CCT Type 2 (V1 lesion); plan THYMO     Dissecting aortic aneurysm (any part), abdominal     Diverticulosis     Encounter for blood transfusion     ESRD (end stage renal disease) 2010    H/O urethral stricture 2018    H/O: urethral stricture     History of AAA (abdominal aortic aneurysm) repair     History of urethral stricture 2018    Hypertension     Hypokalemia     Hypothyroidism  1/10/2014    Inguinal hernia bilateral, non-recurrent     Kidney stones     Organ transplant candidate 11/26/2013    Plantar warts 1/10/2014    Recurrent UTI 7/28/2017    S/P kidney transplant     Secondary hyperparathyroidism, renal     Thyroid disease        Past Surgical History:   Procedure Laterality Date    ABDOMINAL SURGERY      exploratory lapatomy x 2    ABLATION N/A 8/22/2019    Procedure: ABLATION, SVT;  Surgeon: Emerson Mcmanus MD;  Location: [facility] EP LAB;  Service: Cardiology;  Laterality: N/A;  SVT, RFA, CARTO, anes, GP, 323    AORTA - SUPERIOR MESENTERIC ARTERY BYPASS GRAFT      BLADDER NECK RECONSTRUCTION      BLADDER SURGERY      COLONOSCOPY  10/10/2013    Dr. Gutierrez, repeat in 5 years    CYSTOSCOPY      CYSTOSCOPY N/A 12/19/2018    Procedure: CYSTOSCOPY;  Surgeon: Dewey Mann MD;  Location: [address];  Service: Urology;  Laterality: N/A;  45 min    CYSTOURETHROSCOPY WITH DIRECT VISION INTERNAL URETHROTOMY N/A 12/19/2018    Procedure: CYSTOSCOPY, WITH DIRECT VISION INTERNAL URETHROTOMY;  Surgeon: Dewey Mann MD;  Location: [address];  Service: Urology;  Laterality: N/A;    DILATION OF URETHRA N/A 12/19/2018    Procedure: DILATION, URETHRA;  Surgeon: Dewey Mann MD;  [facility];  Service: Urology;  Laterality: N/A;    EXCISIONAL BIOPSY N/A 7/13/2020    Procedure: EXCISIONAL BIOPSY- LEFT INGUINAL NODE;  Surgeon: Vlad Epstein MD;  Location: [facility] OR [address] [place] [address];  Service: General;  Laterality: N/A;    FLEXIBLE CYSTOSCOPY N/A 11/6/2019    Procedure: CYSTOSCOPY, FLEXIBLE;  Surgeon: Dewey Mann MD;  [facility];  Service: Urology;  Laterality: N/A;    GASTROJEJUNOSTOMY      HEMORRHOID SURGERY      HERNIA REPAIR      INSERTION OF VENOUS ACCESS PORT Left 7/27/2020    Procedure: INSERTION, VENOUS ACCESS PORT;  Surgeon: Vlad Epstein MD;  [facility];  Service: General;  Laterality: Left;    KIDNEY  TRANSPLANT      LEFT HEART CATHETERIZATION Left 8/20/2019    Procedure: Left heart cath;  Surgeon: Javan Oscar MD;  Location: [facility] CATH/EP LAB;  Service: Cardiology;  Laterality: Left;    LITHOTRIPSY      LYMPH NODE BIOPSY N/A 6/30/2020    Procedure: BIOPSY, LYMPH NODE;  Surgeon: Ella Diagnostic Provider;  Location: [facility] OR [address] [place] [address];  Service: General;  Laterality: N/A;  189 lymph node biopsy /ULTRASOUND    PERCUTANEOUS NEPHROLITHOTRIPSY      right  ESWL  10/31/12    right ESWL  6/26/12    URETHROPLASTY USING PATCH GRAFT N/A 11/6/2019    Procedure: URETHROPLASTY, USING PATCH GRAFT BUCCAL MUCOSA GRAFT;  Surgeon: Dewey Mann MD;  Location: [facility] OR [address] [place] [address];  Service: Urology;  Laterality: N/A;  3 HOURS       Past Social History:  Social History     Socioeconomic History    Marital status: Single     Spouse name: Not on file    Number of children: Not on file    Years of education: Not on file    Highest education level: Not on file   Occupational History     Employer: Disabled   Social Needs    Financial resource strain: Not on file    Food insecurity     Worry: Not on file     Inability: Not on file    Transportation needs     Medical: Not on file     Non-medical: Not on file   Tobacco Use    Smoking status: Former Smoker     Packs/day: 0.50     Years: 40.00     Pack years: 20.00     Types: Cigarettes     Quit date: 6/16/2010     Years since quitting: 10.3    Smokeless tobacco: Never Used   Substance and Sexual Activity    Alcohol use: Yes     Alcohol/week: 3.0 standard drinks     Types: 3 Cans of beer per week     Comment: occasional/social    Drug use: No     Comment: THC in youth    Sexual activity: Yes     Partners: Female     Birth control/protection: None   Lifestyle    Physical activity     Days per week: Not on file     Minutes per session: Not on file    Stress: Not on file   Relationships    Social connections     Talks on phone: Not on file     Gets together: Not on file      Attends Congregational service: Not on file     Active member of club or organization: Not on file     Attends meetings of clubs or organizations: Not on file     Relationship status: Not on file   Other Topics Concern    Not on file   Social History Narrative    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      Current Outpatient Medications on File Prior to Encounter   Medication Sig Dispense Refill    allopurinoL (ZYLOPRIM) 300 MG tablet Take 1 tablet (300 mg total) by mouth once daily. 30 tablet 2    aspirin (ECOTRIN) 81 MG EC tablet Take 1 tablet (81 mg total) by mouth once daily.  0    calcitRIOL (ROCALTROL) 0.5 MCG Cap Take 1 capsule (0.5 mcg total) by mouth once daily. 30 capsule 11    cefpodoxime (VANTIN) 100 MG tablet Take 1 tablet (100 mg total) by mouth every 12 (twelve) hours. 60 tablet 3    COMBIGAN 0.2-0.5 % Drop Place 1 drop into both eyes 2 (two) times a day.       famotidine (PEPCID) 20 MG tablet TAKE 1 TABLET EVERY EVENING (Patient taking differently: Take 20 mg by mouth every evening. ) 90 tablet 3    ketoconazole (NIZORAL) 200 mg Tab TAKE ONE-HALF (1/2) TABLET ONCE DAILY (Patient taking differently: Take 100 mg by mouth once daily. ) 45 tablet 3    levothyroxine (SYNTHROID) 100 MCG tablet TAKE 1 TABLET DAILY (Patient taking differently: Take 100 mcg by mouth before breakfast. Administer on an empty stomach at least 30 minutes before food. If receiving tube feeds, HOLD tube feeds for 1 hour before and after levothyroxine administration.) 90 tablet 3    magnesium oxide (MAG-OX) 400 mg (241.3 mg magnesium) tablet Take 1 tablet (400 mg total) by mouth once daily. 90 tablet 3    multivitamin (ONE DAILY MULTIVITAMIN) per tablet Take 1 tablet by mouth once daily.      ondansetron (ZOFRAN-ODT) 8 MG TbDL Dissolve 1 tablet (8 mg total) by mouth every 12 (twelve) hours as needed. (Patient taking  differently: Take 8 mg by mouth every 12 (twelve) hours as needed (nausea). ) 21 tablet 1    predniSONE (DELTASONE) 50 MG Tab Take 2 tablets (100 mg total) by mouth once daily. Take on days 2-5 of your chemotherapy cycles. 8 tablet 0    sodium bicarbonate 650 MG tablet Take 1 tablet (650 mg total) by mouth 2 (two) times daily. 540 tablet 3    tacrolimus (PROGRAF) 1 MG Cap Take 3 capsules (3 mg total) by mouth every morning AND 2 capsules (2 mg total) every evening. Z94.0/Kidney Transplant on 11/26/16. 150 capsule 11    travoprost (TRAVATAN Z) 0.004 % ophthalmic solution Place 1 drop into both eyes every evening.        Scheduled Meds:   allopurinoL  100 mg Oral Daily    aspirin  81 mg Oral Daily    brimonidine-timoloL  1 drop Both Eyes Q12H    calcitRIOL  0.5 mcg Oral Daily    cholestyramine-aspartame  1 packet Oral BID    dicyclomine  10 mg Oral QID    dronabinoL  2.5 mg Oral BID    famotidine  20 mg Oral QHS    ferrous sulfate  325 mg Oral BID    Lactobacillus acidoph-L.bulgar  4 tablet Oral TID WM    levothyroxine  100 mcg Oral Before breakfast    metronidazole  500 mg Intravenous Q8H    midodrine  10 mg Oral TID    tacrolimus  2 mg Oral Daily PM    tacrolimus  3 mg Oral Daily AM    travoprost  1 drop Both Eyes QHS    tuberculin  5 Units Intradermal Once    vancomycin  125 mg Oral Q6H     Continuous Infusions:   Amino acid 4.25% - dextrose 5% (CLINIMIX-E) solution with additives (1L provides 42.5 gm AA, 50 gm CHO (170 kcal/L dextrose), Na 35, K 30, Mg 5, Ca 4.5, Acetate 70, Cl 39, Phos 15)       PRN Meds:.acetaminophen, albuterol-ipratropium, hyoscyamine, magnesium sulfate IVPB, magnesium sulfate IVPB, metoclopramide HCl, morphine, ondansetron, potassium chloride in water **AND** potassium chloride in water **AND** potassium chloride in water, promethazine, sodium chloride 0.9%    Allergies:  Patient has no known allergies.    Past Family History:  Reviewed; refer to Hospitalist Admission  "Note    Review of Systems:  Review of Systems - All 14 systems reviewed and negative, except as noted in HPI    Physical Exam:    BP 90/64 (BP Location: Right arm, Patient Position: Lying)   Pulse 96   Temp 96.7 °F (35.9 °C) (Oral)   Resp 18   Ht 5' 11" (1.803 m)   Wt 83.5 kg (184 lb 1.4 oz)   SpO2 98%   BMI 25.67 kg/m²     General Appearance:    Alert, cooperative, no distress, appears stated age   Head:    Normocephalic, without obvious abnormality, atraumatic   Eyes:    PER, conjunctiva/corneas clear, EOM's intact in both eyes        Throat:   Lips, mucosa, and tongue normal; teeth and gums normal   Back:     Symmetric, no curvature, ROM normal, no CVA tenderness   Lungs:     Clear to auscultation bilaterally, respirations unlabored   Chest wall:    No tenderness or deformity   Heart:    Regular rate and rhythm, S1 and S2 normal, no murmur, rub   or gallop   Abdomen:     Tender to palpation   Extremities:   Extremities normal, atraumatic, no cyanosis or edema   Pulses:   2+ and symmetric all extremities   MSK:   No joint or muscle swelling, tenderness or deformity   Skin:   Skin color, texture, turgor normal, no rashes or lesions   Neurologic:   CNII-XII intact, normal strength and sensation       Throughout.  No flap     Results:  Lab Results   Component Value Date     (L) 10/25/2020    K 3.8 10/25/2020     (H) 10/25/2020    CO2 13 (L) 10/25/2020    BUN 44 (H) 10/25/2020    CREATININE 2.3 (H) 10/25/2020    CALCIUM 8.0 (L) 10/25/2020    ANIONGAP 11 10/25/2020    ESTGFRAFRICA 32 (A) 10/25/2020    EGFRNONAA 28 (A) 10/25/2020       Lab Results   Component Value Date    CALCIUM 8.0 (L) 10/25/2020    PHOS 2.8 10/21/2020       Recent Labs   Lab 10/25/20  0434   WBC 7.92   RBC 3.24*   HGB 10.5*   HCT 31.2*      MCV 96   MCH 32.4*   MCHC 33.7          I have personally reviewed pertinent radiological imaging and reports.       "

## 2020-10-25 NOTE — PROGRESS NOTES
"Progress Note  Infectious Disease    Reason for Consult:  C difficile colitis, neutropenia, sepsis    HPI: Alin Burkett is a   70 y.o. male who is status post renal transplant and is receiving chemotherapy for diffuse large B-cell lymphoma, presented to the emergency room on 10/15 with worsening of chronic diarrhea, abdominal cramps of 1 weeks duration.  He was found to be neutropenic with a white blood cell count of 0.2, volume depleted, hypokalemic, was cultured and given fluid resuscitation and vancomycin and cefepime.  He was admitted to the intensive care unit. He was recently given cefpodoxime to take prophylactically associated with his chemotherapy for recurring urinary tract infections.  Most recent positive urine culture was September 18th with E coli sensitive to 3rd generation cephalosporins carbapenems  He has had a hectic fever, stool for C difficile toxin was obtained and was resulted as positive today.  he has had a positive C difficile test before, August 2019.  White blood cells remain depressed at 0.1, platelets are depressed at 91,000, creatinine 1.6.  CT scan of the abdomen obtained last night shows severe proctocolitis without megacolon. Neupogen has been ordered. He is discouraged from eating by severe cramps.      10/17/2020 tmax is improved. Continues to be neutropenic Continues to have cramping, intermittent, abdominal pain. + diarrhea "lost count how many" He had refused vancomycin in am but decided to take it now  10/18/2020 afebrile,(103 on 16th)  ANC improved on granix 0---1029) he is feeling much better today.  Less abdominal pain.  Less loose stools.  10/19/2020.  Afebrile.  T-max 99°.  Less need for pressors.  WBC 5.  Creatinine slightly worse at 1.9.  Discussed with nurse.  Patient may have had some hallucinations earlier  10/20/2020.  Afebrile. Less abdominal pain. Less diarrhea. He feels his abdomen is still distended and does not allow him to take a deep breath. KUB  Is read " as below, I feel that transvere colon is a little bigger today. Creatinine has worsened. Bicarb is 9. Nephrologist is giving bicarb drip.  Las Vancomycin iv and cefepime were  given on 18   Tolerating vancomycin by mouth and metronidazole    10/21: interim reviewed d/w Dr. Jiménez. Requiring bicarb drip. WBC 14 after neupogen, platelets better. Cr stable. Severe hypoalbuminemia. KUB not ominous, CXR with blunted left CPA. Wife reports he is sleeping all of the time and not eating. I aroused him and he agreed to eat some pudding  10/22: interim reviewed. Renal function slightly worse, bicarb corrected to 18 on drip. Urine output is poor and incontinent, midodrine started. Not eating.   10/23:  Interim reviewed, afebrile, oxygenating normally, off norepinephrine since administration of my do drain.  Stools becoming more solid, 2 measured since 0 700, still having incontinence.  Able to do some physical therapy exercises but physical therapy recommends LTAC/rehab/SNF.  Urine output is good, creatinine has stabilized 2.8, white blood cells normal.  CO2 still low at 15.  No new imaging. Slept during visit, discussed with RN and wife.   10/24:  Afebrile, oxygenating well, sleeping and not eating, urine output is very good, creatinine improved, stools with improving form  10/25: low grade temp this afternoon. No stools recorded yet but nursing reports 10 small stools. Patient reports urinary incontinence but waiting until urge before trying to void. Offered condom catheter.. Creatinine a little better. No appetite. Very irritable about being pushed to eat.    Antibiotics (From admission, onward)    Start     Stop Route Frequency Ordered    10/23/20 1800  vancomycin 25 mg/mL oral solution 125 mg  (C. difficile Infection (CDI) Treatment Order Panel)      11/06 1759 Oral Every 6 hours 10/23/20 1623    10/16/20 1900  metronidazole IVPB 500 mg      -- IV Every 8 hours (non-standard times) 10/16/20 1755             EXAM &  DIAGNOSTICS REVIEWED:   Vitals:     Temp:  [96.7 °F (35.9 °C)-100 °F (37.8 °C)]   Temp: 100 °F (37.8 °C) (10/25/20 1729)  Pulse: 89 (10/25/20 1729)  Resp: 18 (10/25/20 1729)  BP: 95/70 (10/25/20 1729)  SpO2: 98 % (10/25/20 0743)    Intake/Output Summary (Last 24 hours) at 10/25/2020 1753  Last data filed at 10/24/2020 1800  Gross per 24 hour   Intake 120 ml   Output --   Net 120 ml       General:  In NAD. Wife at bedside, non toxic  Eyes:   anicteric, EOMI  ENT:   no oral lesions  Neck:  Supple   Lungs: Clear, no consolidation, rales, wheezes, rub  Heart:  RRR, no gallop/murmur/rub noted  Abd:  Mild distention, active BS, no masses or organomegaly appreciated.  No tenderness  :  Voids/  Incontinent,  no flank tenderness  Musc:  Joints without effusion, swelling, erythema, synovitis,    Skin:  No rashes.    Wound:   Neuro: Alert, conversant, non focal.    Psych:  irritable     Extrem: generalized edema, no erythema, phlebitis, cellulitis, warm and well perfused  VAD:  portacath without redness, drainage    Isolation:  Special contact    Lines/Tubes/Drains:    General Labs reviewed:  Recent Labs   Lab 10/23/20  0344 10/24/20  0351 10/25/20  0434   WBC 9.40 11.71 7.92   HGB 9.4* 10.7* 10.5*   HCT 28.1* 32.5* 31.2*   * 186 177       Recent Labs   Lab 10/20/20  2352  10/23/20  0344 10/24/20  0350 10/25/20  0434   *   < > 133* 132* 135*   K 4.3   < > 3.9 4.3 3.8   *   < > 110 109 111*   CO2 12*   < > 15* 13* 13*   BUN 37*   < > 37* 44* 44*   CREATININE 2.5*   < > 2.8* 2.5* 2.3*   CALCIUM 7.9*   < > 7.6* 7.9* 8.0*   PROT 4.2*  --   --   --   --    BILITOT 0.5  --   --   --   --    ALKPHOS 154*  --   --   --   --    ALT 18  --   --   --   --    AST 21  --   --   --   --     < > = values in this interval not displayed.     Micro:  Microbiology Results (last 7 days)     Procedure Component Value Units Date/Time    Urine Culture High Risk [[id number]] Collected: 10/20/20 0022    Order Status: Completed  Specimen: Urine, Clean Catch Updated: 10/21/20 1928     Urine Culture, Routine No growth    Narrative:      Indicated criteria for high risk culture:->Other  Other (specify):->Neutropenic patient    Blood culture x two cultures. Draw prior to antibiotics. [[id number]] Collected: 10/15/20 1626    Order Status: Completed Specimen: Blood from Peripheral, Right Hand Updated: 10/21/20 0612     Blood Culture, Routine No growth after 5 days.    Narrative:      Aerobic and anaerobic    Blood culture x two cultures. Draw prior to antibiotics. [[id number]] Collected: 10/15/20 1546    Order Status: Completed Specimen: Blood from Antecubital, Right Arm Updated: 10/21/20 0612     Blood Culture, Routine No growth after 5 days.    Narrative:      Aerobic and anaerobic        Imaging Reviewed:   KUBThree round radiodensities overlie the left upper quadrant may represent ingested pills or be in the patient's clothing.  A radiodense thread is noted over the right lower quadrant of uncertain significance.   CXR clear   CT abdomen and pelvis 10/16 :    Findings consistent with severe proctocolitis.    Transplanted kidney right side of the pelvis with mild pelvocaliectasis and perinephric stranding nonspecific.  No calcified stones seen Additional findings as detailed above including cystic lesion with in the native right kidney versus dilatation of collecting structure of the native right kidney.  Stones in the native right kidney.  Cystic lesion within the pancreas  Cardiology:    IMPRESSION & PLAN   1. Severe C. Difficile colitis, improved   Prompted by oral antibiotics and/or chemotherapy   History of Cdiff 8/2019   Metabolic acidosis,improved    2. PTLD, EBV related lymphoma, receiving chemotherapy      neutropenia, resolved  3. S/p renal transplant on immunosuppression, [facility], Cr 2. 53 improving  4. Chronic diarrhea  5. History of recurrent UTIs  6. anorexia    Recommendations:     IV flagyl and   oral vancomyin   125 mg Q 6 for Cdiff  for least another 2 weeks    cholestyramine and probiotic   Needs aggressive nutritional support .  Wife concerned that he is too sedated.  I reduced the Marinol 2.5 b.i.d.   out of bed as able     Would qualify for LTAC

## 2020-10-25 NOTE — SUBJECTIVE & OBJECTIVE
Interval History:  Patient currently stable map is above 70.  Symptoms improving patient states that he feels much better    Review of Systems   Constitutional: Positive for fatigue. Negative for appetite change, chills and fever.   HENT: Negative for congestion, hearing loss, rhinorrhea, sore throat, trouble swallowing and voice change.    Respiratory: Negative for cough, chest tightness, shortness of breath and wheezing.    Cardiovascular: Negative for chest pain, palpitations and leg swelling.   Gastrointestinal: Positive for abdominal pain and diarrhea. Negative for blood in stool, nausea and vomiting.   Genitourinary: Negative for difficulty urinating, frequency, hematuria and urgency.   Musculoskeletal: Negative for back pain, joint swelling and neck stiffness.   Skin: Negative for pallor and rash.   Neurological: Negative for tremors, seizures, syncope, speech difficulty, weakness, numbness and headaches.   Hematological: Negative for adenopathy.   Psychiatric/Behavioral: Negative for agitation, behavioral problems, confusion and sleep disturbance.     Objective:     Vital Signs (Most Recent):  Temp: 96.7 °F (35.9 °C) (10/25/20 0743)  Pulse: 96 (10/25/20 0743)  Resp: 18 (10/25/20 0743)  BP: 90/64 (10/25/20 0743)  SpO2: 98 % (10/25/20 0743) Vital Signs (24h Range):  Temp:  [96.7 °F (35.9 °C)-98.7 °F (37.1 °C)] 96.7 °F (35.9 °C)  Pulse:  [] 96  Resp:  [18-36] 18  SpO2:  [83 %-100 %] 98 %  BP: ()/(62-97) 90/64     Weight: 83.5 kg (184 lb 1.4 oz)  Body mass index is 25.67 kg/m².    Intake/Output Summary (Last 24 hours) at 10/25/2020 0859  Last data filed at 10/24/2020 1800  Gross per 24 hour   Intake 416.37 ml   Output 425 ml   Net -8.63 ml      Physical Exam  Vitals signs and nursing note reviewed.   Constitutional:       General: He is not in acute distress.     Appearance: He is ill-appearing. He is not diaphoretic.   HENT:      Head: Normocephalic and atraumatic.      Mouth/Throat:      Mouth:  Mucous membranes are dry.   Eyes:      General: No scleral icterus.        Right eye: No discharge.         Left eye: No discharge.      Pupils: Pupils are equal, round, and reactive to light.   Neck:      Vascular: No JVD.   Cardiovascular:      Rate and Rhythm: Normal rate and regular rhythm.   Pulmonary:      Effort: Pulmonary effort is normal.      Breath sounds: Normal breath sounds.   Abdominal:      General: Bowel sounds are normal. There is no distension.      Palpations: Abdomen is soft.      Tenderness: There is abdominal tenderness in the left upper quadrant and left lower quadrant. There is no rebound.   Musculoskeletal:      Right lower leg: No edema.      Left lower leg: No edema.   Skin:     General: Skin is warm.      Capillary Refill: Capillary refill takes less than 2 seconds.      Findings: No rash.   Neurological:      General: No focal deficit present.      Mental Status: He is alert.      Cranial Nerves: No cranial nerve deficit.   Psychiatric:         Mood and Affect: Mood normal.         Significant Labs:   BMP:   Recent Labs   Lab 10/25/20  0434   GLU 94   *   K 3.8   *   CO2 13*   BUN 44*   CREATININE 2.3*   CALCIUM 8.0*     CBC:   Recent Labs   Lab 10/24/20  0351 10/25/20  0434   WBC 11.71 7.92   HGB 10.7* 10.5*   HCT 32.5* 31.2*    177       Significant Imaging: I have reviewed all pertinent imaging results/findings within the past 24 hours.

## 2020-10-25 NOTE — PROGRESS NOTES
"[facility] Medicine  Progress Note    Patient Name: Alin Burkett  MRN: [id number]  Patient Class: IP- Inpatient   Admission Date: 10/15/2020  Length of Stay: 10 days  Attending Physician: Julia Summers MD  Primary Care Provider: Carmen Krueger MD        Subjective:     Principal Problem:C. difficile colitis        HPI:  Patient has been having diarrhea with "jelly-like" consistency as well as crampiness in the abdomen.  Symptoms began roughly one week ago.  Also has had fever and malaise.  Appetite poor.  Fatigue as well.  He has been receiving chemo for PTLD; most recent cycle of CHOP was end of last week.  He has history of renal transplant four years ago and is currently on twice-a-day  Prograf.  He's had no cough, no unusual headache, no sinus pain, and no burning on urination.  He feels that he's probably dehydrated.    Overview/Hospital Course:  No notes on file    Interval History:  Patient currently stable map is above 70.  Symptoms improving patient states that he feels much better    Review of Systems   Constitutional: Positive for fatigue. Negative for appetite change, chills and fever.   HENT: Negative for congestion, hearing loss, rhinorrhea, sore throat, trouble swallowing and voice change.    Respiratory: Negative for cough, chest tightness, shortness of breath and wheezing.    Cardiovascular: Negative for chest pain, palpitations and leg swelling.   Gastrointestinal: Positive for abdominal pain and diarrhea. Negative for blood in stool, nausea and vomiting.   Genitourinary: Negative for difficulty urinating, frequency, hematuria and urgency.   Musculoskeletal: Negative for back pain, joint swelling and neck stiffness.   Skin: Negative for pallor and rash.   Neurological: Negative for tremors, seizures, syncope, speech difficulty, weakness, numbness and headaches.   Hematological: Negative for adenopathy.   Psychiatric/Behavioral: Negative for agitation, behavioral " problems, confusion and sleep disturbance.     Objective:     Vital Signs (Most Recent):  Temp: 96.7 °F (35.9 °C) (10/25/20 0743)  Pulse: 96 (10/25/20 0743)  Resp: 18 (10/25/20 0743)  BP: 90/64 (10/25/20 0743)  SpO2: 98 % (10/25/20 0743) Vital Signs (24h Range):  Temp:  [96.7 °F (35.9 °C)-98.7 °F (37.1 °C)] 96.7 °F (35.9 °C)  Pulse:  [] 96  Resp:  [18-36] 18  SpO2:  [83 %-100 %] 98 %  BP: ()/(62-97) 90/64     Weight: 83.5 kg (184 lb 1.4 oz)  Body mass index is 25.67 kg/m².    Intake/Output Summary (Last 24 hours) at 10/25/2020 0859  Last data filed at 10/24/2020 1800  Gross per 24 hour   Intake 416.37 ml   Output 425 ml   Net -8.63 ml      Physical Exam  Vitals signs and nursing note reviewed.   Constitutional:       General: He is not in acute distress.     Appearance: He is ill-appearing. He is not diaphoretic.   HENT:      Head: Normocephalic and atraumatic.      Mouth/Throat:      Mouth: Mucous membranes are dry.   Eyes:      General: No scleral icterus.        Right eye: No discharge.         Left eye: No discharge.      Pupils: Pupils are equal, round, and reactive to light.   Neck:      Vascular: No JVD.   Cardiovascular:      Rate and Rhythm: Normal rate and regular rhythm.   Pulmonary:      Effort: Pulmonary effort is normal.      Breath sounds: Normal breath sounds.   Abdominal:      General: Bowel sounds are normal. There is no distension.      Palpations: Abdomen is soft.      Tenderness: There is abdominal tenderness in the left upper quadrant and left lower quadrant. There is no rebound.   Musculoskeletal:      Right lower leg: No edema.      Left lower leg: No edema.   Skin:     General: Skin is warm.      Capillary Refill: Capillary refill takes less than 2 seconds.      Findings: No rash.   Neurological:      General: No focal deficit present.      Mental Status: He is alert.      Cranial Nerves: No cranial nerve deficit.   Psychiatric:         Mood and Affect: Mood normal.          Significant Labs:   BMP:   Recent Labs   Lab 10/25/20  0434   GLU 94   *   K 3.8   *   CO2 13*   BUN 44*   CREATININE 2.3*   CALCIUM 8.0*     CBC:   Recent Labs   Lab 10/24/20  0351 10/25/20  0434   WBC 11.71 7.92   HGB 10.7* 10.5*   HCT 32.5* 31.2*    177       Significant Imaging: I have reviewed all pertinent imaging results/findings within the past 24 hours.      Assessment/Plan:      * C. difficile colitis  Source is likely colitis from C.difficile.     Map appears to be above 70.  Will continue oral vancomycin and IV metronidazole  Patient condition improving at this point  As per infectious disease will benefit from LTAC placement and will need vancomycin and metronidazole for 2 weeks      Microbiology Results (last 7 days)     Procedure Component Value Units Date/Time    Urine Culture High Risk [[id number]] Collected: 10/20/20 0022    Order Status: Completed Specimen: Urine, Clean Catch Updated: 10/21/20 1928     Urine Culture, Routine No growth    Narrative:      Indicated criteria for high risk culture:->Other  Other (specify):->Neutropenic patient    Blood culture x two cultures. Draw prior to antibiotics. [[id number]] Collected: 10/15/20 1626    Order Status: Completed Specimen: Blood from Peripheral, Right Hand Updated: 10/21/20 0612     Blood Culture, Routine No growth after 5 days.    Narrative:      Aerobic and anaerobic    Blood culture x two cultures. Draw prior to antibiotics. [[id number]] Collected: 10/15/20 1546    Order Status: Completed Specimen: Blood from Antecubital, Right Arm Updated: 10/21/20 0612     Blood Culture, Routine No growth after 5 days.    Narrative:      Aerobic and anaerobic            Acute renal failure superimposed on stage 3 chronic kidney disease  Creatinine trending down  Possibly at ATN  improving at this point   BMP reviewed- noted Estimated Creatinine Clearance: 32.3 mL/min (A) (based on SCr of 2.3 mg/dL (H)). according to latest data.    Monitor UOP and serial BMP and adjust therapy as needed. Renally dose meds.  Results for TANG LAUREN (MRN [id number]) as of 10/24/2020 16:35   Ref. Range 10/23/2020 03:44 10/24/2020 03:50   BUN, Bld Latest Ref Range: 8 - 23 mg/dL 37 (H) 44 (H)   Creatinine Latest Ref Range: 0.5 - 1.4 mg/dL 2.8 (H) 2.5 (H)         Sepsis  Improved  Will continue Vanc PO and metro    Iron deficiency  History noted  Will follow Hematology recommendation      Moderate malnutrition  Nutrition consulted. Body mass index is 25.67 kg/m².. Encourage maximal PO intake. Diet supplementation ordered per nutrition approval. Will encourage PO and monitor closely for weight changes.      Anemia in stage 3 chronic kidney disease  Patient's anemia is currently controlled. Has not received any PRBCs to date.. Etiology likely d/t  anemia of chronic disease  Current CBC reviewed-   Lab Results   Component Value Date    HGB 10.5 (L) 10/25/2020    HCT 31.2 (L) 10/25/2020     Monitor serial CBC and transfuse if patient becomes hemodynamically unstable, symptomatic or H/H drops below 7/21.         Anemia due to chemotherapy  Patient's anemia is currently controlled. Has not received any PRBCs to date.. Etiology likely d/t chemo no also has iron-deficiency anemia  Continue ferrous sulfate 325mg PO BID  Current CBC reviewed-   Lab Results   Component Value Date    HGB 10.5 (L) 10/25/2020    HCT 31.2 (L) 10/25/2020     Monitor serial CBC and transfuse if patient becomes hemodynamically unstable, symptomatic or H/H drops below 7/21.         Hypokalemia due to excessive gastrointestinal loss of potassium  Resolved Give supplement and monitor level.    Potassium   Date Value Ref Range Status   10/24/2020 4.3 3.5 - 5.1 mmol/L Final   10/23/2020 3.9 3.5 - 5.1 mmol/L Final         Febrile neutropenia  Improved  Monitor leukocyte count.  Hematology on the case  Granix given today.;    WBC   Date Value Ref Range Status   10/24/2020 11.71 3.90 - 12.70 K/uL  Final       Post-transplant lymphoproliferative disorder (PTLD)  Has been receiving chemo for this.      -donor kidney transplant  Continue Prograf at usual dose.      Acquired hypothyroidism  Continue levothyroxine.    Metabolic acidosis  Acidosis persistent  Results for TANG LAUREN (MRN [id number]) as of 10/24/2020 16:35   Ref. Range 10/24/2020 03:50   CO2 Latest Ref Range: 23 - 29 mmol/L 13 (L)   Anion Gap Latest Ref Range: 8 - 16 mmol/L 10      Was most likely secondary to diarrhea  Nephrology on the case  Will continue sodium bicarb      VTE Risk Mitigation (From admission, onward)         Ordered     IP VTE LOW RISK PATIENT  Once      10/15/20 2220                Discharge Planning   TRINH:      Code Status: Full Code   Is the patient medically ready for discharge?:     Reason for patient still in hospital (select all that apply): Treatment  Discharge Plan A: Home with family                  Julia Summers MD  Department of Hospital Medicine   [facility]

## 2020-10-25 NOTE — ASSESSMENT & PLAN NOTE
Patient's anemia is currently controlled. Has not received any PRBCs to date.. Etiology likely d/t  anemia of chronic disease  Current CBC reviewed-   Lab Results   Component Value Date    HGB 10.5 (L) 10/25/2020    HCT 31.2 (L) 10/25/2020     Monitor serial CBC and transfuse if patient becomes hemodynamically unstable, symptomatic or H/H drops below 7/21.

## 2020-10-25 NOTE — RESPIRATORY THERAPY
10/25/20 0740   Patient Assessment/Suction   Level of Consciousness (AVPU) alert   PRE-TX-O2   O2 Device (Oxygen Therapy) room air   SpO2 99 %   Pulse Oximetry Type Intermittent   $ Pulse Oximetry - Multiple Charge Pulse Oximetry - Multiple   Aerosol Therapy   $ Aerosol Therapy Charges PRN treatment not required

## 2020-10-25 NOTE — ASSESSMENT & PLAN NOTE
Patient's anemia is currently controlled. Has not received any PRBCs to date.. Etiology likely d/t chemo no also has iron-deficiency anemia  Continue ferrous sulfate 325mg PO BID  Current CBC reviewed-   Lab Results   Component Value Date    HGB 10.5 (L) 10/25/2020    HCT 31.2 (L) 10/25/2020     Monitor serial CBC and transfuse if patient becomes hemodynamically unstable, symptomatic or H/H drops below 7/21.

## 2020-10-26 NOTE — PLAN OF CARE
Pt is limited with mobility due to frequent loose stools upon mobility.  Pt with abd discomfort with bed mobs however pt required sba for rolling with use of bed rail.  Pt to benefit from continued skilled PT services to improve fxn mobs, transfers, gait and there ex for strengthening. Lola Pryor, PTA

## 2020-10-26 NOTE — PROGRESS NOTES
[facility]  Patient Name: Alin Burkett  : 1950  Age: 70 y.o.  Sex: male  MRN: [id number]  Admission Date: 10/15/2020  Hospital Length of Stay: 11 days  Code Status: Full Code   Admitting Provider: Brennan Decker MD  Attending Provider: Julia Summers MD  Primary Care Physician: Carmen Krueger MD  Full Code  Subjective:     Date of Visit: 10/26/2020             Principal Problem: C. difficile colitis    Patient ID: Alin Burkett is a 70 y.o. 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 ED workup showed pt was neutropenic with WBC 0.2 with fever. Pt was cultured and given fluid resuscitation and vancomycin and cefepime. Stool cultures positive for C. Difficile toxin. CT abd/pelvis without contrast revealed severe proctocolitis without megacolon. Pt remains pancytopenic with most recent WBC 0.11 with ANC 0.0 and H/H 9.6/29.7 and platelets 91. Pt seen at bedside with his wife and states he has chronic throbbing abdominal pain made worse by eating and has had chronic diarrhea. He states he is fatigued and has decreased appetite along with fever/chills and night sweats.    10/19/2020: Pt seen at bedside and appears cachectic. He endorses that he is still having diarrhea, but that it has improved and decreased in frequency and consistency has thickened. Pt states abdominal pain has improved, but it still present. He endorses fatigue. Pt denies hemoptysis, hematochezia, melena, hematuria.    10/20/2020: Pt seen at bedside in ICU. He states that he is feeling less lethargic. He states his diarrhea is improving and that he only has abdominal pain to palpation.    10/21/2020: Pt seen at bedside in ICU NAD. He states his abdominal pain has decreased. He is still having  diarrhea but with less frequency.     10/22/2020: Pt seen at bedside in ICU. Pt states his abdominal pain has decreased and it no longer hurt to palpate his abdomen. Pt states diarrhea is improving.     10/23/2020: Pt seen at bedside in ICU. Pt states he no longer has abdominal pain and that his fatigue is improving and that his stool is thickening in consistency.    10/26/2020: Pt seen at bedside. He states abdominal pain has been intermittent and that he has had worsening of diarrhea over the last 3 days. No other complaints at this time.     Review of Systems   Constitutional: Positive for activity change and fatigue. Negative for chills and fever.   HENT: Negative for mouth sores and trouble swallowing.    Eyes: Negative for photophobia and visual disturbance.   Respiratory: Negative for cough, chest tightness, shortness of breath, wheezing and stridor.    Cardiovascular: Negative for chest pain and leg swelling.   Gastrointestinal: Positive for abdominal distention, abdominal pain and diarrhea. Negative for constipation, nausea and vomiting.   Musculoskeletal: Negative for arthralgias, back pain and myalgias.   Skin: Negative for color change, pallor, rash and wound.   Neurological: Negative for syncope, speech difficulty and weakness.   Hematological: Negative for adenopathy. Does not bruise/bleed easily.   Psychiatric/Behavioral: Negative for agitation, behavioral problems, confusion, decreased concentration and dysphoric mood.        ONCOLOGY HISTORY:     1. Monomorphic post-transplant lymphoproliferative disorder              A. 2/2020: Noticed left inguinal lymphadenopathy after a dog bite (failed to resolve with antibiotics)              B. 6/2/2020: Saw Dr. Collins in infectious diseases for inguinal lymphadenopathy - referred for biopsy              C. 6/30/2020: Core biopsy of L inguinal lymph node shows B-cell lymphoma of germinal center origin; EBV-positive by LONNIE; morphology nondiagnostic of PTLD vs  follicular lymphoma              D. 2020: PET/CT shows left internal iliac chain node measuring 3.3 x 3 cm with SUV max 37; L inguinal node measures 3.2 x 2.4 cm with SUV max 36              E. 2020: Excisional biopsy of left inguinal node shows monomorphic post-transplant lymphoproliferative disorder (DLBCL, GCB 60%, follicular lymphoma, grade 3B 40%); FISH for MYC rearrangement is negative              F. 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  Renal allograft biopsy 17 ([facility]): 21 glomeruli, none globally sclerosed, <5% interstitial fibrosis, no ACR, c4d negative, AVR CCT Type 2 (V1 lesion); plan THYMO     Dissecting aortic aneurysm (any part), abdominal     Diverticulosis     Encounter for blood transfusion     ESRD (end stage renal disease) 2010    H/O urethral stricture 2018    H/O: urethral stricture     History of AAA (abdominal aortic aneurysm) repair     History of urethral stricture 2018    Hypertension     Hypokalemia     Hypothyroidism 1/10/2014    Inguinal hernia bilateral, non-recurrent     Kidney stones     Organ transplant candidate 2013    Plantar warts 1/10/2014    Recurrent UTI 2017    S/P kidney transplant     Secondary hyperparathyroidism, renal     Thyroid disease        Family History   Problem Relation  Age of Onset    Diabetes Mother     Alzheimer's disease Mother     Alcohol abuse Father     HIV Brother     Stroke Maternal Aunt     Kidney disease Paternal Uncle     Kidney disease Cousin     No Known Problems Sister     No Known Problems Daughter     No Known Problems Sister     No Known Problems Brother     No Known Problems Brother     Cancer Brother         thyroid cancer    Colon cancer Brother     Melanoma Neg Hx     Psoriasis Neg Hx     Lupus Neg Hx     Eczema Neg Hx     Colon polyps Neg Hx     Crohn's disease Neg Hx     Ulcerative colitis Neg Hx     Celiac disease Neg Hx        Past Surgical History:   Procedure Laterality Date    ABDOMINAL SURGERY      exploratory lapatomy x 2    ABLATION N/A 8/22/2019    Procedure: ABLATION, SVT;  Surgeon: Emerson Mcmanus MD;  Location: [facility] EP LAB;  Service: Cardiology;  Laterality: N/A;  SVT, RFA, CARTO, anes, GP, 323    AORTA - SUPERIOR MESENTERIC ARTERY BYPASS GRAFT      BLADDER NECK RECONSTRUCTION      BLADDER SURGERY      COLONOSCOPY  10/10/2013    Dr. Gutierrez, repeat in 5 years    CYSTOSCOPY      CYSTOSCOPY N/A 12/19/2018    Procedure: CYSTOSCOPY;  Surgeon: Dewey Mann MD;  Location: [address];  Service: Urology;  Laterality: N/A;  45 min    CYSTOURETHROSCOPY WITH DIRECT VISION INTERNAL URETHROTOMY N/A 12/19/2018    Procedure: CYSTOSCOPY, WITH DIRECT VISION INTERNAL URETHROTOMY;  Surgeon: Dewey Mann MD;  [facility];  Service: Urology;  Laterality: N/A;    DILATION OF URETHRA N/A 12/19/2018    Procedure: DILATION, URETHRA;  Surgeon: Dewey Mann MD;  [facility];  Service: Urology;  Laterality: N/A;    EXCISIONAL BIOPSY N/A 7/13/2020    Procedure: EXCISIONAL BIOPSY- LEFT INGUINAL NODE;  Surgeon: Vlad Epstein MD;  Location: [facility] OR 2ND FLR;  Service: General;  Laterality: N/A;    FLEXIBLE CYSTOSCOPY N/A 11/6/2019    Procedure: CYSTOSCOPY, FLEXIBLE;  Surgeon: Dewey Mann MD;   [address];  Service: Urology;  Laterality: N/A;    GASTROJEJUNOSTOMY      HEMORRHOID SURGERY      HERNIA REPAIR      INSERTION OF VENOUS ACCESS PORT Left 7/27/2020    Procedure: INSERTION, VENOUS ACCESS PORT;  Surgeon: Vlad Epstein MD;  Location: [address];  Service: General;  Laterality: Left;    KIDNEY TRANSPLANT      LEFT HEART CATHETERIZATION Left 8/20/2019    Procedure: Left heart cath;  Surgeon: Javan Oscar MD;  Location: [facility] CATH/EP LAB;  Service: Cardiology;  Laterality: Left;    LITHOTRIPSY      LYMPH NODE BIOPSY N/A 6/30/2020    Procedure: BIOPSY, LYMPH NODE;  Surgeon: [facility] Diagnostic Provider;  Location: [address];  Service: General;  Laterality: N/A;  189 lymph node biopsy /ULTRASOUND    PERCUTANEOUS NEPHROLITHOTRIPSY      right  ESWL  10/31/12    right ESWL  6/26/12    URETHROPLASTY USING PATCH GRAFT N/A 11/6/2019    Procedure: URETHROPLASTY, USING PATCH GRAFT BUCCAL MUCOSA GRAFT;  Surgeon: Dewey Mann MD;  Location: [address];  Service: Urology;  Laterality: N/A;  3 HOURS       Social History     Socioeconomic History    Marital status: Single     Spouse name: Not on file    Number of children: Not on file    Years of education: Not on file    Highest education level: Not on file   Occupational History     Employer: Disabled   Social Needs    Financial resource strain: Not on file    Food insecurity     Worry: Not on file     Inability: Not on file    Transportation needs     Medical: Not on file     Non-medical: Not on file   Tobacco Use    Smoking status: Former Smoker     Packs/day: 0.50     Years: 40.00     Pack years: 20.00     Types: Cigarettes     Quit date: 6/16/2010     Years since quitting: 10.3    Smokeless tobacco: Never Used   Substance and Sexual Activity    Alcohol use: Yes     Alcohol/week: 3.0 standard drinks     Types: 3 Cans of beer per week     Comment: occasional/social    Drug use: No     Comment: THC  in youth    Sexual activity: Yes     Partners: Female     Birth control/protection: None   Lifestyle    Physical activity     Days per week: Not on file     Minutes per session: Not on file    Stress: Not on file   Relationships    Social connections     Talks on phone: Not on file     Gets together: Not on file     Attends [employer] service: Not on file     Active member of club or organization: Not on file     Attends meetings of clubs or organizations: Not on file     Relationship status: Not on file   Other Topics Concern    Not on file   Social History Narrative    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       Current Facility-Administered Medications   Medication Dose Route Frequency Provider Last Rate Last Dose    acetaminophen tablet 650 mg  650 mg Oral Q4H PRN Julia Summers MD   650 mg at 10/25/20 1812    albuterol-ipratropium 2.5 mg-0.5 mg/3 mL nebulizer solution 3 mL  3 mL Nebulization Q6H PRN Julia Summers MD   3 mL at 10/20/20 1602    allopurinoL tablet 100 mg  100 mg Oral Daily Julia Summers MD   100 mg at 10/26/20 0929    aspirin EC tablet 81 mg  81 mg Oral Daily Julia Summers MD   81 mg at 10/26/20 0929    brimonidine-timoloL 0.2-0.5 % ophthalmic solution 1 drop  1 drop Both Eyes Q12H Julia Summers MD   1 drop at 10/26/20 0930    calcitRIOL capsule 0.5 mcg  0.5 mcg Oral Daily Julia Summers MD   0.5 mcg at 10/26/20 0929    dicyclomine capsule 10 mg  10 mg Oral QID Julia Summers MD   10 mg at 10/26/20 0929    dronabinoL capsule 2.5 mg  2.5 mg Oral BID Julia Summers MD   2.5 mg at 10/26/20 1104    famotidine tablet 20 mg  20 mg Oral QHS Julia Summers MD   20 mg at 10/25/20 2045    ferrous sulfate EC tablet 325 mg  325 mg Oral BID Julia Summers MD   325 mg at 10/26/20 0929    hyoscyamine ODT 0.125 mg  0.125 mg Sublingual Q4H PRN Julia Summers MD   0.125 mg at 10/17/20 7633    levothyroxine tablet 100 mcg  100 mcg  Oral Before breakfast Julia Summers MD   100 mcg at 10/26/20 0602    magnesium sulfate 2g in water 50mL IVPB (premix)  2 g Intravenous PRN Julia Summers MD        magnesium sulfate 2g in water 50mL IVPB (premix)  4 g Intravenous PRN Julia Summers MD        metoclopramide HCl injection 10 mg  10 mg Intravenous Q6H PRN Julia Summers MD        metronidazole IVPB 500 mg  500 mg Intravenous Q8H Julia Summers  mL/hr at 10/26/20 1104 500 mg at 10/26/20 1104    midodrine tablet 10 mg  10 mg Oral TID Julia Summers MD   10 mg at 10/26/20 0929    morphine injection 2 mg  2 mg Intravenous Q4H PRN Julia Summers MD   2 mg at 10/20/20 2213    ondansetron disintegrating tablet 8 mg  8 mg Oral Q6H PRN Julia Summers MD        potassium chloride 10 mEq in 100 mL IVPB  40 mEq Intravenous PRN Julia Summers  mL/hr at 10/20/20 0729 40 mEq at 10/20/20 0729    And    potassium chloride 10 mEq in 100 mL IVPB  60 mEq Intravenous PRN Julia Summers MD        And    potassium chloride 10 mEq in 100 mL IVPB  80 mEq Intravenous PRN Julia Summers MD        promethazine tablet 25 mg  25 mg Oral Q6H PRN Julia Summers MD        sodium chloride 0.9% flush 10 mL  10 mL Intravenous PRN Julia Summers MD        tacrolimus capsule 2 mg  2 mg Oral Daily PM Julia Summers MD   2 mg at 10/25/20 1719    tacrolimus capsule 3 mg  3 mg Oral Daily AM Julia Summers MD   3 mg at 10/26/20 0928    travoprost 0.004 % ophthalmic solution 1 drop  1 drop Both Eyes QHS Julia Summers MD   1 drop at 10/25/20 2046    vancomycin 25 mg/mL oral solution 125 mg  125 mg Oral Q6H Julia Summers MD   125 mg at 10/26/20 1104        allopurinoL  100 mg Oral Daily    aspirin  81 mg Oral Daily    brimonidine-timoloL  1 drop Both Eyes Q12H    calcitRIOL  0.5 mcg Oral Daily    dicyclomine  10 mg Oral QID    dronabinoL  2.5 mg Oral BID    famotidine  20 mg  Oral QHS    ferrous sulfate  325 mg Oral BID    levothyroxine  100 mcg Oral Before breakfast    metronidazole  500 mg Intravenous Q8H    midodrine  10 mg Oral TID    tacrolimus  2 mg Oral Daily PM    tacrolimus  3 mg Oral Daily AM    travoprost  1 drop Both Eyes QHS    vancomycin  125 mg Oral Q6H           acetaminophen, albuterol-ipratropium, hyoscyamine, magnesium sulfate IVPB, magnesium sulfate IVPB, metoclopramide HCl, morphine, ondansetron, potassium chloride in water **AND** potassium chloride in water **AND** potassium chloride in water, promethazine, sodium chloride 0.9%    Antibiotics (From admission, onward)    Start     Stop Route Frequency Ordered    10/23/20 1800  vancomycin 25 mg/mL oral solution 125 mg  (C. difficile Infection (CDI) Treatment Order Panel)      11/06 1759 Oral Every 6 hours 10/23/20 1623    10/16/20 1900  metronidazole IVPB 500 mg      -- IV Every 8 hours (non-standard times) 10/16/20 1755          Review of patient's allergies indicates:  No Known Allergies  All medications and past history have been reviewed.    Objective:      Vitals:  Patient Vitals for the past 24 hrs:   BP Temp Temp src Pulse Resp SpO2   10/26/20 1156 112/65 98.4 °F (36.9 °C) Oral 108 18 99 %   10/26/20 0927 92/68 98.8 °F (37.1 °C) Oral 97 18 96 %   10/26/20 0800 -- -- -- -- -- 98 %   10/26/20 0428 104/70 96.5 °F (35.8 °C) Oral 110 20 97 %   10/25/20 2333 122/63 98.7 °F (37.1 °C) Oral 93 18 96 %   10/25/20 1933 103/68 99.5 °F (37.5 °C) Oral 99 18 98 %   10/25/20 1812 -- 100 °F (37.8 °C) -- -- -- --   10/25/20 1729 95/70 100 °F (37.8 °C) -- 89 18 --      Body mass index is 25.67 kg/m².  Body surface area is 2.05 meters squared.    Last 24 Hours:    Intake/Output Summary (Last 24 hours) at 10/26/2020 1316  Last data filed at 10/26/2020 1104  Gross per 24 hour   Intake 460 ml   Output 400 ml   Net 60 ml     Weight Readings:  Wt Readings from Last 5 Encounters:   10/24/20 83.5 kg (184 lb 1.4 oz)   10/10/20  68.5 kg (151 lb)   10/09/20 66.4 kg (146 lb 6.2 oz)   10/02/20 65.8 kg (145 lb)   09/11/20 68.6 kg (151 lb 2 oz)      Blood Type:  B POS     Physical Exam  Constitutional:       Appearance: He is ill-appearing.   HENT:      Head: Normocephalic and atraumatic.      Right Ear: External ear normal.      Left Ear: External ear normal.      Nose: Nose normal. No congestion or rhinorrhea.   Eyes:      General:         Right eye: No discharge.         Left eye: No discharge.      Extraocular Movements: Extraocular movements intact.      Conjunctiva/sclera: Conjunctivae normal.      Pupils: Pupils are equal, round, and reactive to light.   Neck:      Musculoskeletal: Normal range of motion and neck supple. No neck rigidity.   Cardiovascular:      Rate and Rhythm: Normal rate and regular rhythm.      Heart sounds: Normal heart sounds. No murmur.   Pulmonary:      Effort: Pulmonary effort is normal. No respiratory distress.      Breath sounds: Normal breath sounds. No stridor. No wheezing, rhonchi or rales.   Abdominal:      General: Bowel sounds are normal. There is distension.      Palpations: Abdomen is soft.      Tenderness: There is abdominal tenderness (to palpation in the LLQ). There is no guarding.   Musculoskeletal: Normal range of motion.         General: No deformity.      Right lower leg: No edema.      Left lower leg: No edema.   Lymphadenopathy:      Cervical: No cervical adenopathy.   Skin:     General: Skin is warm and dry.      Coloration: Skin is not pale.      Findings: Bruising (bialteral UE and chest) present. No erythema or rash.   Neurological:      General: No focal deficit present.      Mental Status: He is alert and oriented to person, place, and time. Mental status is at baseline.      Cranial Nerves: No cranial nerve deficit.   Psychiatric:         Mood and Affect: Mood normal.         Behavior: Behavior normal.         Thought Content: Thought content normal.         Judgment: Judgment normal.          Labs:  Recent Labs   Lab 10/24/20  0351 10/25/20  0434 10/26/20  0416   WBC 11.71 7.92 5.43   RBC 3.35* 3.24* 3.33*   HGB 10.7* 10.5* 10.2*   HCT 32.5* 31.2* 32.2*    177 171   MCV 97 96 97     Recent Labs   Lab 10/20/20  2352 10/21/20  0354  10/24/20  0350 10/25/20  0434 10/26/20  0416   * 132*   < > 132* 135* 136   K 4.3 4.1   < > 4.3 3.8 3.6   * 111*   < > 109 111* 115*   CO2 12* 11*   < > 13* 13* 12*   BUN 37* 35*   < > 44* 44* 36*   CREATININE 2.5* 2.6*   < > 2.5* 2.3* 2.2*   GLU 92 92   < > 110 94 83   CALCIUM 7.9* 7.9*   < > 7.9* 8.0* 7.8*   PHOS  --  2.8  --   --   --   --    ALKPHOS 154*  --   --   --   --   --    PROT 4.2*  --   --   --   --   --    ALBUMIN 1.5* 1.4*  --   --   --   --    BILITOT 0.5  --   --   --   --   --    AST 21  --   --   --   --   --    ALT 18  --   --   --   --   --     < > = values in this interval not displayed.       Imaging:  Results for orders placed or performed during the hospital encounter of 10/15/20 (from the past 2160 hour(s))   CT Abdomen Pelvis  Without Contrast    Impression    Findings consistent with severe proctocolitis.    Transplanted kidney right side of the pelvis with mild pelvocaliectasis and perinephric stranding nonspecific.  No calcified stones seen    Additional findings as detailed above including cystic lesion with in the native right kidney versus dilatation of collecting structure of the native right kidney.  Stones in the native right kidney.  Cystic lesion within the pancreas.    Final read    Virtual Radiology concordant      Electronically signed by: Ada Castillo MD  Date:    10/16/2020  Time:    08:56   Results for orders placed or performed during the hospital encounter of 08/06/20 (from the past 2160 hour(s))   CT Chest Abdomen Pelvis Without Contrast (XPD)    Impression    No acute abdominal pathology identified.    Nonvisualization of the left kidney with malrotation of the right kidney and multiple nonobstructing  renal stones.  No hydronephrosis.    Right pelvic renal allograft with no evidence for renal allograft focal lesion, nephrolithiasis, or hydronephrosis.    Stable mild ectasia of the infrarenal abdominal aorta measuring up to 2.6 cm without evidence for aortic aneurysm or other acute aortic pathology.  Atherosclerosis identified.    Multiple enlarged lymph nodes in the left inguinal region, with interval dissection of multiple left inguinal and pelvic lymph nodes when compared to nuclear medicine PET-CT 07/08/2020.  Correlate with biopsy results.    Stable cystic appearing lesion in the pancreatic head measuring 1.2 cm.    Other findings as above.    Electronically signed by resident: Mati Cárdenas  Date:    08/06/2020  Time:    09:23    Electronically signed by: Jayson Staples MD  Date:    08/06/2020  Time:    10:34     *Note: Due to a large number of results and/or encounters for the requested time period, some results have not been displayed. A complete set of results can be found in Results Review.     PET CT 07/08/2020  FINDINGS:  Quality of the study: Adequate.     In the neck, there is no abnormal hypermetabolic activity worrisome for malignancy.  Vascular stent identified in the left axillary region.  No significant lymphadenopathy.     In the chest, there is no abnormal hypermetabolic activity worsened malignancy.  Coronary atherosclerosis.  0.4 cm pulmonary nodule identified in the left upper lobe (axial series 3, image 67), lesion is too small to characterize with PET-CT. Recommend attenuation expected follow-up examinations. No significant lymphadenopathy.     In the abdomen and pelvis, there is hypermetabolic lymphadenopathy throughout the left internal/external iliac chain and left groin.  New left internal iliac chain node measures 3.3 x 3 0 cm with SUV max of 37 (axial fused image 193).  Left inguinal node measures approximately 3.2 x 2.4 cm with SUV max of 36 (axial fused image 218), previously  measured up to 1.4 cm.  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  The right kidney appears atrophic with abnormal contour parenchymal calcifications, unchanged.  Right lower quadrant transplant kidney in place.  Stable small bladder diverticulum.  Stable 1.2 cm pancreatic cyst, better characterized on most recent CT with IV contrast.  Stable bilateral probable adrenal adenomas.  Colonic diverticulosis without evidence of acute diverticulitis.  Stable fusiform abdominal aortic ectasia.     Spleen appears upper limit of normal for size measuring 12.0 cm in craniocaudal dimension.  Normal heterogeneous uptake similar to liver.     In the bones, there is no abnormal activity worrisome for malignancy.  Additional focus of increased uptake identified within the myofascial structures of the thigh, just deep to the left femur with SUV max of 27 (axial fused image 269).     Impression:     Hypermetabolic lymphadenopathy throughout the left iliac chain and left groin with additional hypermetabolic focus in the left thigh.  No hypermetabolic juan david disease above the diaphragm.  The Deauville score is 5.    All lab results and imaging results have been reviewed.    Assessment and Plan:      Present on Admission:   Febrile neutropenia   C. difficile colitis   Acquired hypothyroidism   Post-transplant lymphoproliferative disorder (PTLD)   Acute renal failure superimposed on stage 3 chronic kidney disease   Hypokalemia due to excessive gastrointestinal loss of potassium   Anemia due to chemotherapy   Anemia in stage 3 chronic kidney disease   Moderate malnutrition   Metabolic acidosis   Sepsis       Post-transplant lymphoproliferative disorder (PTLD)  -Cycle 4 [facility] completed on 10/09/2020.  -Please have patient follow up with Primary Oncologist Dr. Luis Fernando Lopez within 72 hours of discharge.      C-difficile colitis  -Follow ID recommendations.     [facility]  -Followed by Nephrology.      Normocytic Anemia with  Thrombocytopenia  -Anemia stable. Platelets have recovered. Neutropenia resolved s/p granix 300mcg daily x 3 doses with last dose given 10/18/2020.  -Continue ferrous sulfate EC 325mg BID for iron deficiency. Last iron and tibc shows improvement of serum iron to 24 and saturated iron to 15.       Sincerely,  Alexi Camp PA-C    Note is available for collaborating MD; Dr. Cathy Stafford for review.    Electronically signed by: Alexi Camp PA-C

## 2020-10-26 NOTE — RESPIRATORY THERAPY
10/26/20 0800   Patient Assessment/Suction   Level of Consciousness (AVPU) alert   PRE-TX-O2   O2 Device (Oxygen Therapy) room air   SpO2 98 %   Pulse Oximetry Type Intermittent   $ Pulse Oximetry - Multiple Charge Pulse Oximetry - Multiple   Aerosol Therapy   $ Aerosol Therapy Charges PRN treatment not required

## 2020-10-26 NOTE — PROGRESS NOTES
" INPATIENT NEPHROLOGY PROGRESS  [facility] NEPHROLOGY    Alin Burkett  10/26/2020    Reason for consultation:    Acute kidney injury     Chief Complaint:   Chief Complaint   Patient presents with    Diarrhea     for the past 3 days,last received chemotherapy 6 days ago.     Abdominal Pain          History of Present Illness:    Per H and P    Patient has been having diarrhea with "jelly-like" consistency as well as crampiness in the abdomen.  Symptoms began roughly one week ago.  Also has had fever and malaise.  Appetite poor.  Fatigue as well.  He has been receiving chemo for PTLD; most recent cycle of CHOP was end of last week.  He has history of renal transplant four years ago and is currently on twice-a-day  Prograf.  He's had no cough, no unusual headache, no sinus pain, and no burning on urination.     10/20  Has some diarrhea and abdominal pain.  No nausea, chest pain, sob, new neuro symptoms, new joint pain.  He is very weak.    I told him that he had previously been seen by doctor Merchant and I would call him to see him.  He stated he didn't want to see Dr Merchant and requested that I become his nephrologist.    10/21  Not much change in renal function since yesterday. UOP 1.3L.  Sleeping quietly.  10/23  Sleeping.  1550 urine output.    10/24 VSS, no new complains. Appreciate ID and Heme input.  10/25 VSS, no new complains. sCr is better.  10/26 VSS, no new complains. Labs are acceptable. Lethargic at times.      Plan of Care:    Acute kidney injury likely hemodynamically mediated, c.diff colitis  Kidney transplant  --urine sodium low implicating renal hypoperfusion   --avoid NSAIDS, Sales II inhibitors, and other non-essential nephrotoxic agents  --renal dose medication for crcl 10-50  --keep map above 55  --treat c.diff  --continue IS for kidney transplant.    Nongap metabolic acidosis likely combination of GI losses and renal tubular defect (urine pH inappropriately high)  --urine anion gap not accurate " due to low urine sodium     PTLD lymphoma, s/p renal transplant  --tacrolimus level therapeutic  --appreciate Heme input    Hyponatremia  --better  --avoid hypotonic iv piggy backs  --no ssri antidepressants or thiazide diuretics    Thank you for allowing us to participate in this patient's care. We will continue to follow.    Vital Signs:  Temp Readings from Last 3 Encounters:   10/26/20 98.8 °F (37.1 °C) (Oral)   10/10/20 98.3 °F (36.8 °C) (Oral)   10/09/20 98.6 °F (37 °C)       Pulse Readings from Last 3 Encounters:   10/26/20 97   10/10/20 95   10/09/20 (!) 56       BP Readings from Last 3 Encounters:   10/26/20 92/68   10/10/20 102/63   10/09/20 115/76       Weight:  Wt Readings from Last 3 Encounters:   10/24/20 83.5 kg (184 lb 1.4 oz)   10/10/20 68.5 kg (151 lb)   10/09/20 66.4 kg (146 lb 6.2 oz)       Past Medical & Surgical History:  Past Medical History:   Diagnosis Date    Acidosis     Adrenal adenoma     Anemia associated with chronic renal failure     Arrhythmia, onset 2015    Awaiting organ transplant status 2013    Basal cell carcinoma 2012    left nasal tip    Blood type B+ 2013    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  Renal allograft biopsy 17 (DIVINE): 21 glomeruli, none globally sclerosed, <5% interstitial fibrosis, no ACR, c4d negative, AVR CCT Type 2 (V1 lesion); plan THYMO     Dissecting aortic aneurysm (any part), abdominal     Diverticulosis     Encounter for blood transfusion     ESRD (end stage renal disease) 2010    H/O urethral stricture 2018    H/O: urethral stricture     History of AAA (abdominal aortic aneurysm) repair     History of urethral stricture 2018     Hypertension     Hypokalemia     Hypothyroidism 1/10/2014    Inguinal hernia bilateral, non-recurrent     Kidney stones     Organ transplant candidate 11/26/2013    Plantar warts 1/10/2014    Recurrent UTI 7/28/2017    S/P kidney transplant     Secondary hyperparathyroidism, renal     Thyroid disease        Past Surgical History:   Procedure Laterality Date    ABDOMINAL SURGERY      exploratory lapatomy x 2    ABLATION N/A 8/22/2019    Procedure: ABLATION, SVT;  Surgeon: Emerson Mcmanus MD;  Location: [facility] EP LAB;  Service: Cardiology;  Laterality: N/A;  SVT, RFA, CARTO, anes, GP, 323    AORTA - SUPERIOR MESENTERIC ARTERY BYPASS GRAFT      BLADDER NECK RECONSTRUCTION      BLADDER SURGERY      COLONOSCOPY  10/10/2013    Dr. Gutierrez, repeat in 5 years    CYSTOSCOPY      CYSTOSCOPY N/A 12/19/2018    Procedure: CYSTOSCOPY;  Surgeon: Dewey Mann MD;  Location: [facility] OR [address] [place] [address];  Service: Urology;  Laterality: N/A;  45 min    CYSTOURETHROSCOPY WITH DIRECT VISION INTERNAL URETHROTOMY N/A 12/19/2018    Procedure: CYSTOSCOPY, WITH DIRECT VISION INTERNAL URETHROTOMY;  Surgeon: Dewey Mann MD;  Location: [address];  Service: Urology;  Laterality: N/A;    DILATION OF URETHRA N/A 12/19/2018    Procedure: DILATION, URETHRA;  Surgeon: Dewey Mann MD;  [facility];  Service: Urology;  Laterality: N/A;    EXCISIONAL BIOPSY N/A 7/13/2020    Procedure: EXCISIONAL BIOPSY- LEFT INGUINAL NODE;  Surgeon: Vlad Epstein MD;  Location: [facility] OR [address] [place] [address];  Service: General;  Laterality: N/A;    FLEXIBLE CYSTOSCOPY N/A 11/6/2019    Procedure: CYSTOSCOPY, FLEXIBLE;  Surgeon: Dewey Mann MD;  Location: [facility] OR [address] [place] [address];  Service: Urology;  Laterality: N/A;    GASTROJEJUNOSTOMY      HEMORRHOID SURGERY      HERNIA REPAIR      INSERTION OF VENOUS ACCESS PORT Left 7/27/2020    Procedure: INSERTION, VENOUS ACCESS PORT;  Surgeon: Vlad Epstein MD;  Location: [facility] OR [address] [place] [address];   Service: General;  Laterality: Left;    KIDNEY TRANSPLANT      LEFT HEART CATHETERIZATION Left 8/20/2019    Procedure: Left heart cath;  Surgeon: Javan Oscar MD;  Location: [facility] CATH/EP LAB;  Service: Cardiology;  Laterality: Left;    LITHOTRIPSY      LYMPH NODE BIOPSY N/A 6/30/2020    Procedure: BIOPSY, LYMPH NODE;  Surgeon: Ella Diagnostic Provider;  Location: [facility] OR [address] [place] [address];  Service: General;  Laterality: N/A;  189 lymph node biopsy /ULTRASOUND    PERCUTANEOUS NEPHROLITHOTRIPSY      right  ESWL  10/31/12    right ESWL  6/26/12    URETHROPLASTY USING PATCH GRAFT N/A 11/6/2019    Procedure: URETHROPLASTY, USING PATCH GRAFT BUCCAL MUCOSA GRAFT;  Surgeon: Dewey Mann MD;  Location: [facility] OR [address] [place] [address];  Service: Urology;  Laterality: N/A;  3 HOURS       Past Social History:  Social History     Socioeconomic History    Marital status: Single     Spouse name: Not on file    Number of children: Not on file    Years of education: Not on file    Highest education level: Not on file   Occupational History     Employer: Disabled   Social Needs    Financial resource strain: Not on file    Food insecurity     Worry: Not on file     Inability: Not on file    Transportation needs     Medical: Not on file     Non-medical: Not on file   Tobacco Use    Smoking status: Former Smoker     Packs/day: 0.50     Years: 40.00     Pack years: 20.00     Types: Cigarettes     Quit date: 6/16/2010     Years since quitting: 10.3    Smokeless tobacco: Never Used   Substance and Sexual Activity    Alcohol use: Yes     Alcohol/week: 3.0 standard drinks     Types: 3 Cans of beer per week     Comment: occasional/social    Drug use: No     Comment: THC in youth    Sexual activity: Yes     Partners: Female     Birth control/protection: None   Lifestyle    Physical activity     Days per week: Not on file     Minutes per session: Not on file    Stress: Not on file   Relationships    Social connections     Talks on phone:  Not on file     Gets together: Not on file     Attends Latter day service: Not on file     Active member of club or organization: Not on file     Attends meetings of clubs or organizations: Not on file     Relationship status: Not on file   Other Topics Concern    Not on file   Social History Narrative    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      Current Outpatient Medications on File Prior to Encounter   Medication Sig Dispense Refill    allopurinoL (ZYLOPRIM) 300 MG tablet Take 1 tablet (300 mg total) by mouth once daily. 30 tablet 2    aspirin (ECOTRIN) 81 MG EC tablet Take 1 tablet (81 mg total) by mouth once daily.  0    calcitRIOL (ROCALTROL) 0.5 MCG Cap Take 1 capsule (0.5 mcg total) by mouth once daily. 30 capsule 11    cefpodoxime (VANTIN) 100 MG tablet Take 1 tablet (100 mg total) by mouth every 12 (twelve) hours. 60 tablet 3    COMBIGAN 0.2-0.5 % Drop Place 1 drop into both eyes 2 (two) times a day.       famotidine (PEPCID) 20 MG tablet TAKE 1 TABLET EVERY EVENING (Patient taking differently: Take 20 mg by mouth every evening. ) 90 tablet 3    ketoconazole (NIZORAL) 200 mg Tab TAKE ONE-HALF (1/2) TABLET ONCE DAILY (Patient taking differently: Take 100 mg by mouth once daily. ) 45 tablet 3    levothyroxine (SYNTHROID) 100 MCG tablet TAKE 1 TABLET DAILY (Patient taking differently: Take 100 mcg by mouth before breakfast. Administer on an empty stomach at least 30 minutes before food. If receiving tube feeds, HOLD tube feeds for 1 hour before and after levothyroxine administration.) 90 tablet 3    magnesium oxide (MAG-OX) 400 mg (241.3 mg magnesium) tablet Take 1 tablet (400 mg total) by mouth once daily. 90 tablet 3    multivitamin (ONE DAILY MULTIVITAMIN) per tablet Take 1 tablet by mouth once daily.      ondansetron (ZOFRAN-ODT) 8 MG TbDL Dissolve 1 tablet (8 mg total) by mouth every 12  "(twelve) hours as needed. (Patient taking differently: Take 8 mg by mouth every 12 (twelve) hours as needed (nausea). ) 21 tablet 1    predniSONE (DELTASONE) 50 MG Tab Take 2 tablets (100 mg total) by mouth once daily. Take on days 2-5 of your chemotherapy cycles. 8 tablet 0    sodium bicarbonate 650 MG tablet Take 1 tablet (650 mg total) by mouth 2 (two) times daily. 540 tablet 3    tacrolimus (PROGRAF) 1 MG Cap Take 3 capsules (3 mg total) by mouth every morning AND 2 capsules (2 mg total) every evening. Z94.0/Kidney Transplant on 11/26/16. 150 capsule 11    travoprost (TRAVATAN Z) 0.004 % ophthalmic solution Place 1 drop into both eyes every evening.        Scheduled Meds:   allopurinoL  100 mg Oral Daily    aspirin  81 mg Oral Daily    brimonidine-timoloL  1 drop Both Eyes Q12H    calcitRIOL  0.5 mcg Oral Daily    dicyclomine  10 mg Oral QID    dronabinoL  2.5 mg Oral BID    famotidine  20 mg Oral QHS    ferrous sulfate  325 mg Oral BID    levothyroxine  100 mcg Oral Before breakfast    metronidazole  500 mg Intravenous Q8H    midodrine  10 mg Oral TID    tacrolimus  2 mg Oral Daily PM    tacrolimus  3 mg Oral Daily AM    travoprost  1 drop Both Eyes QHS    vancomycin  125 mg Oral Q6H     Continuous Infusions:    PRN Meds:.acetaminophen, albuterol-ipratropium, hyoscyamine, magnesium sulfate IVPB, magnesium sulfate IVPB, metoclopramide HCl, morphine, ondansetron, potassium chloride in water **AND** potassium chloride in water **AND** potassium chloride in water, promethazine, sodium chloride 0.9%    Allergies:  Patient has no known allergies.    Past Family History:  Reviewed; refer to Hospitalist Admission Note    Review of Systems:  Review of Systems - All 14 systems reviewed and negative, except as noted in HPI    Physical Exam:    BP 92/68 (BP Location: Right arm, Patient Position: Lying)   Pulse 97   Temp 98.8 °F (37.1 °C) (Oral)   Resp 18   Ht 5' 11" (1.803 m)   Wt 83.5 kg (184 lb " 1.4 oz)   SpO2 96%   BMI 25.67 kg/m²     General Appearance:    Alert, cooperative, no distress, appears stated age   Head:    Normocephalic, without obvious abnormality, atraumatic   Eyes:    PER, conjunctiva/corneas clear, EOM's intact in both eyes        Throat:   Lips, mucosa, and tongue normal; teeth and gums normal   Back:     Symmetric, no curvature, ROM normal, no CVA tenderness   Lungs:     Clear to auscultation bilaterally, respirations unlabored   Chest wall:    No tenderness or deformity   Heart:    Regular rate and rhythm, S1 and S2 normal, no murmur, rub   or gallop   Abdomen:     Tender to palpation   Extremities:   Extremities normal, atraumatic, no cyanosis or edema   Pulses:   2+ and symmetric all extremities   MSK:   No joint or muscle swelling, tenderness or deformity   Skin:   Skin color, texture, turgor normal, no rashes or lesions   Neurologic:   CNII-XII intact, normal strength and sensation       Throughout.  No flap     Results:  Lab Results   Component Value Date     10/26/2020    K 3.6 10/26/2020     (H) 10/26/2020    CO2 12 (L) 10/26/2020    BUN 36 (H) 10/26/2020    CREATININE 2.2 (H) 10/26/2020    CALCIUM 7.8 (L) 10/26/2020    ANIONGAP 9 10/26/2020    ESTGFRAFRICA 34 (A) 10/26/2020    EGFRNONAA 29 (A) 10/26/2020       Lab Results   Component Value Date    CALCIUM 7.8 (L) 10/26/2020    PHOS 2.8 10/21/2020       Recent Labs   Lab 10/26/20  0416   WBC 5.43   RBC 3.33*   HGB 10.2*   HCT 32.2*      MCV 97   MCH 30.6   MCHC 31.7*          I have personally reviewed pertinent radiological imaging and reports.

## 2020-10-26 NOTE — PT/OT/SLP PROGRESS
Physical Therapy Treatment    Patient Name:  Alin Burkett   MRN:  [id number]    Recommendations:     Discharge Recommendations:  nursing facility, skilled   Discharge Equipment Recommendations: walker, rolling   Barriers to discharge: c diff    Assessment:     Alin Burkett is a 70 y.o. male admitted with a medical diagnosis of C. difficile colitis.  He presents with the following impairments/functional limitations:  weakness, impaired endurance, decreased lower extremity function, impaired functional mobilty   .    Rehab Prognosis: Fair; patient would benefit from acute skilled PT services to address these deficits and reach maximum level of function.    Recent Surgery: * No surgery found *      Plan:     During this hospitalization, patient to be seen 6 x/week to address the identified rehab impairments via gait training, therapeutic activities, therapeutic exercises and progress toward the following goals:    · Plan of Care Expires:  11/30/20    Subjective     Chief Complaint: number of BM's with any movement he makes, pain in bad with mobs  Patient/Family Comments/goals:to not go every time he moves  Patient Comfort:  · Pain Rating 1: 0/10  · Pain Addressed 1: Pre-medicate for activity, Cessation of Activity, Distraction  · Pain Rating Post-Intervention 1: 0/10(pt without pain at rest, then reports pain in abd with mobs or BM)      Objective:     Communicated with BRUNO Garcia prior to session.  Patient found supine with   upon PT entry to room.     General Precautions: Standard, contact   Orthopedic Precautions:N/A   Braces: N/A     Functional Mobility:  · Bed Mobility:     · Rolling Right: contact guard assistance      AM-PAC 6 CLICK MOBILITY          Therapeutic Activities and Exercises:   Pt seen for attempts at there ex for le strengthening, during session, pt had a bm and required changing of attends.  Ppt assisted with rolling to r side and lifting hips for PTA to change and perform ingrid care and change  attends.  Pt then repositioned self in supine and declined any further attempts of ther ex. Many RN notified of session.     Patient left supine with all lines intact, call button in reach and bed alarm on..    GOALS:   Multidisciplinary Problems     Physical Therapy Goals        Problem: Physical Therapy Goal    Goal Priority Disciplines Outcome Goal Variances Interventions   Physical Therapy Goal     PT, PT/OT Ongoing, Progressing     Description: Goals to be met by: 2020     Patient will increase functional independence with mobility by performin. Supine to sit with MInimal Assistance  2. Sit to stand transfer with Minimal Assistance  3. Bed to chair transfer with Minimal Assistance using Rolling Walker  4. Gait  x 250 feet with Minimal Assistance using Rolling Walker.   5. Lower extremity exercise program x20 reps                      Time Tracking:     PT Received On: 10/26/20  PT Start Time: 222     PT Stop Time: 245  PT Total Time (min): 23 min     Billable Minutes: Therapeutic Activity 23 min    Treatment Type: Treatment  PT/PTA: PTA     PTA Visit Number: 1     Lola Konstantin, PTA  10/26/2020

## 2020-10-26 NOTE — PROGRESS NOTES
"Progress Note  Infectious Disease    Reason for Consult:  C difficile colitis, neutropenia, sepsis    HPI: Alin Burkett is a   70 y.o. male who is status post renal transplant and is receiving chemotherapy for diffuse large B-cell lymphoma, presented to the emergency room on 10/15 with worsening of chronic diarrhea, abdominal cramps of 1 weeks duration.  He was found to be neutropenic with a white blood cell count of 0.2, volume depleted, hypokalemic, was cultured and given fluid resuscitation and vancomycin and cefepime.  He was admitted to the intensive care unit. He was recently given cefpodoxime to take prophylactically associated with his chemotherapy for recurring urinary tract infections.  Most recent positive urine culture was September 18th with E coli sensitive to 3rd generation cephalosporins carbapenems  He has had a hectic fever, stool for C difficile toxin was obtained and was resulted as positive today.  he has had a positive C difficile test before, August 2019.  White blood cells remain depressed at 0.1, platelets are depressed at 91,000, creatinine 1.6.  CT scan of the abdomen obtained last night shows severe proctocolitis without megacolon. Neupogen has been ordered. He is discouraged from eating by severe cramps.      10/17/2020 tmax is improved. Continues to be neutropenic Continues to have cramping, intermittent, abdominal pain. + diarrhea "lost count how many" He had refused vancomycin in am but decided to take it now  10/18/2020 afebrile,(103 on 16th)  ANC improved on granix 0---1029) he is feeling much better today.  Less abdominal pain.  Less loose stools.  10/19/2020.  Afebrile.  T-max 99°.  Less need for pressors.  WBC 5.  Creatinine slightly worse at 1.9.  Discussed with nurse.  Patient may have had some hallucinations earlier  10/20/2020.  Afebrile. Less abdominal pain. Less diarrhea. He feels his abdomen is still distended and does not allow him to take a deep breath. KUB  Is read " as below, I feel that transvere colon is a little bigger today. Creatinine has worsened. Bicarb is 9. Nephrologist is giving bicarb drip.  Las Vancomycin iv and cefepime were  given on 18   Tolerating vancomycin by mouth and metronidazole    10/21: interim reviewed d/w Dr. Jiménez. Requiring bicarb drip. WBC 14 after neupogen, platelets better. Cr stable. Severe hypoalbuminemia. KUB not ominous, CXR with blunted left CPA. Wife reports he is sleeping all of the time and not eating. I aroused him and he agreed to eat some pudding  10/22: interim reviewed. Renal function slightly worse, bicarb corrected to 18 on drip. Urine output is poor and incontinent, midodrine started. Not eating.   10/23:  Interim reviewed, afebrile, oxygenating normally, off norepinephrine since administration of my do drain.  Stools becoming more solid, 2 measured since 0 700, still having incontinence.  Able to do some physical therapy exercises but physical therapy recommends LTAC/rehab/SNF.  Urine output is good, creatinine has stabilized 2.8, white blood cells normal.  CO2 still low at 15.  No new imaging. Slept during visit, discussed with RN and wife.   10/24:  Afebrile, oxygenating well, sleeping and not eating, urine output is very good, creatinine improved, stools with improving form  10/25: low grade temp this afternoon. No stools recorded yet but nursing reports 10 small stools. Patient reports urinary incontinence but waiting until urge before trying to void. Offered condom catheter.. Creatinine a little better. No appetite. Very irritable about being pushed to eat.  10/26: afebrile. Continues to have incontinent loose stools. Declining much physical therapy. Referral sent to LTAC. Cr better. Questran was discontinued, not sure why. He is asleep and I did not disturb, but spoke with his brother.     Antibiotics (From admission, onward)    Start     Stop Route Frequency Ordered    10/23/20 1800  vancomycin 25 mg/mL oral solution 125 mg   (C. difficile Infection (CDI) Treatment Order Panel)      11/06 1759 Oral Every 6 hours 10/23/20 1623    10/16/20 1900  metronidazole IVPB 500 mg      -- IV Every 8 hours (non-standard times) 10/16/20 1755             EXAM & DIAGNOSTICS REVIEWED:   Vitals:     Temp:  [96.5 °F (35.8 °C)-100 °F (37.8 °C)]   Temp: 99.8 °F (37.7 °C) (10/26/20 1627)  Pulse: 97 (10/26/20 1627)  Resp: 18 (10/26/20 1627)  BP: 95/66 (10/26/20 1627)  SpO2: 97 % (10/26/20 1627)    Intake/Output Summary (Last 24 hours) at 10/26/2020 1805  Last data filed at 10/26/2020 1104  Gross per 24 hour   Intake 460 ml   Output --   Net 460 ml     Exam 10/25  General:  In NAD.    Eyes:   anicteric, EOMI  ENT:   no oral lesions  Neck:  Supple   Lungs: Clear, no consolidation, rales, wheezes, rub  Heart:  RRR, no gallop/murmur/rub noted  Abd:  Mild distention, active BS, no masses or organomegaly appreciated.  No tenderness  :  Voids/  Incontinent,  no flank tenderness  Musc:  Joints without effusion, swelling, erythema, synovitis,    Skin:  No rashes.    Wound:   Neuro: Alert, conversant, non focal.    Psych:  irritable     Extrem: generalized edema, no erythema, phlebitis, cellulitis, warm and well perfused  VAD:  portacath without redness, drainage    Isolation:  Special contact    Lines/Tubes/Drains:    General Labs reviewed:  Recent Labs   Lab 10/24/20  0351 10/25/20  0434 10/26/20  0416   WBC 11.71 7.92 5.43   HGB 10.7* 10.5* 10.2*   HCT 32.5* 31.2* 32.2*    177 171       Recent Labs   Lab 10/20/20  2352  10/24/20  0350 10/25/20  0434 10/26/20  0416   *   < > 132* 135* 136   K 4.3   < > 4.3 3.8 3.6   *   < > 109 111* 115*   CO2 12*   < > 13* 13* 12*   BUN 37*   < > 44* 44* 36*   CREATININE 2.5*   < > 2.5* 2.3* 2.2*   CALCIUM 7.9*   < > 7.9* 8.0* 7.8*   PROT 4.2*  --   --   --   --    BILITOT 0.5  --   --   --   --    ALKPHOS 154*  --   --   --   --    ALT 18  --   --   --   --    AST 21  --   --   --   --     < > = values in this  interval not displayed.     Micro:  Microbiology Results (last 7 days)     Procedure Component Value Units Date/Time    Urine Culture High Risk [[id number]] Collected: 10/20/20 0022    Order Status: Completed Specimen: Urine, Clean Catch Updated: 10/21/20 1928     Urine Culture, Routine No growth    Narrative:      Indicated criteria for high risk culture:->Other  Other (specify):->Neutropenic patient    Blood culture x two cultures. Draw prior to antibiotics. [[id number]] Collected: 10/15/20 1626    Order Status: Completed Specimen: Blood from Peripheral, Right Hand Updated: 10/21/20 0612     Blood Culture, Routine No growth after 5 days.    Narrative:      Aerobic and anaerobic    Blood culture x two cultures. Draw prior to antibiotics. [[id number]] Collected: 10/15/20 1546    Order Status: Completed Specimen: Blood from Antecubital, Right Arm Updated: 10/21/20 0612     Blood Culture, Routine No growth after 5 days.    Narrative:      Aerobic and anaerobic        Imaging Reviewed:   KUBThree round radiodensities overlie the left upper quadrant may represent ingested pills or be in the patient's clothing.  A radiodense thread is noted over the right lower quadrant of uncertain significance.   CXR clear   CT abdomen and pelvis 10/16 :    Findings consistent with severe proctocolitis.    Transplanted kidney right side of the pelvis with mild pelvocaliectasis and perinephric stranding nonspecific.  No calcified stones seen Additional findings as detailed above including cystic lesion with in the native right kidney versus dilatation of collecting structure of the native right kidney.  Stones in the native right kidney.  Cystic lesion within the pancreas  Cardiology:    IMPRESSION & PLAN   1. Severe C. Difficile colitis, improved, but still having diarrhea   Prompted by oral antibiotics and/or chemotherapy   History of Cdiff 8/2019   Metabolic acidosis,improved    2. PTLD, EBV related lymphoma, receiving chemotherapy       neutropenia, resolved  3. S/p renal transplant on immunosuppression, [facility], Cr   improving  4. Chronic diarrhea  5. History of recurrent UTIs  6. anorexia    Recommendations:     IV flagyl and   oral vancomyin   125 mg Q 6 for Cdiff for least another 2 weeks    TID cholestyramine(don't know why it was discontinued) and probiotic   Needs aggressive nutritional support .  out of bed as able     appropriate for LTAC, not making nearly enough progress to go home

## 2020-10-27 PROBLEM — A41.9 SEPSIS: Status: RESOLVED | Noted: 2020-01-01 | Resolved: 2020-01-01

## 2020-10-27 PROBLEM — I95.9 HYPOTENSION: Status: ACTIVE | Noted: 2020-01-01

## 2020-10-27 NOTE — ASSESSMENT & PLAN NOTE
Source is likely colitis from C.difficile.   Map appears to be above 70.  Will continue oral vancomycin and IV metronidazole  Patient condition improving at this point  As per infectious disease will benefit from LTAC placement and will need vancomycin and metronidazole for 2 weeks      Microbiology Results (last 7 days)     Procedure Component Value Units Date/Time    Urine Culture High Risk [[id number]] Collected: 10/20/20 0022    Order Status: Completed Specimen: Urine, Clean Catch Updated: 10/21/20 1928     Urine Culture, Routine No growth    Narrative:      Indicated criteria for high risk culture:->Other  Other (specify):->Neutropenic patient    Blood culture x two cultures. Draw prior to antibiotics. [[id number]] Collected: 10/15/20 1626    Order Status: Completed Specimen: Blood from Peripheral, Right Hand Updated: 10/21/20 0612     Blood Culture, Routine No growth after 5 days.    Narrative:      Aerobic and anaerobic    Blood culture x two cultures. Draw prior to antibiotics. [[id number]] Collected: 10/15/20 1546    Order Status: Completed Specimen: Blood from Antecubital, Right Arm Updated: 10/21/20 0612     Blood Culture, Routine No growth after 5 days.    Narrative:      Aerobic and anaerobic

## 2020-10-27 NOTE — PLAN OF CARE
I spoke to the pts wife Mrs. Burkett at [phone] updated her on the status of the LTAC request from Dr. Summers and to ask if I could send to other locations. She stated that the pt is a transplant patient from 2016, is immunocompromised and is currently on his fourth round of chemotherapy. She does not feel that it is safe for him to discharge to a LTAC, SNF or rehab. She stated that the goal for once he got out of ICU was to get aggressive physical therapy and be able to take him home by the end of the week. She stated that while in ICU they were monitoring his food intake however he will be extremely weak now because he has not eaten in 2 days. She stated that she needs someone to talk to him about working aggressively with therapy and once he can sit and stand she can take him home with home health services. She stated that he will thrive at home and deteriorate in a facility or hospital. She stated that she needs to speak to both Dr. Summers and Dr. Tolbert regarding the patients plan of care. I will update both practitioners. Davida Deluca     Per Dr. Summers- he spoke to the pts wife and she is ok with LTAC placement. I sent to [facility] to review for acceptance. Davida Deluca LCSW     3:19- Per Albina with [facility] [phone]- she is going to come and see the pt in the morning and if appropriate will request auth- she has already verified that the pt has out of network benefits. She will keep me updated. Davida Deluca LCSW         10/27/20 1043   Post-Acute Status   Post-Acute Authorization Placement   Post-Acute Placement Status Referrals Sent

## 2020-10-27 NOTE — ASSESSMENT & PLAN NOTE
Source is likely colitis from C.difficile.     Map appears to be above 70.  Will continue oral vancomycin and IV metronidazole  Patient condition improving at this point  As per infectious disease will benefit from LTAC placement and will need vancomycin and metronidazole for 2 weeks      Microbiology Results (last 7 days)     Procedure Component Value Units Date/Time    Urine Culture High Risk [[id number]] Collected: 10/20/20 0022    Order Status: Completed Specimen: Urine, Clean Catch Updated: 10/21/20 1928     Urine Culture, Routine No growth    Narrative:      Indicated criteria for high risk culture:->Other  Other (specify):->Neutropenic patient    Blood culture x two cultures. Draw prior to antibiotics. [[id number]] Collected: 10/15/20 1626    Order Status: Completed Specimen: Blood from Peripheral, Right Hand Updated: 10/21/20 0612     Blood Culture, Routine No growth after 5 days.    Narrative:      Aerobic and anaerobic    Blood culture x two cultures. Draw prior to antibiotics. [[id number]] Collected: 10/15/20 1546    Order Status: Completed Specimen: Blood from Antecubital, Right Arm Updated: 10/21/20 0612     Blood Culture, Routine No growth after 5 days.    Narrative:      Aerobic and anaerobic

## 2020-10-27 NOTE — ASSESSMENT & PLAN NOTE
Creatinine trending down  Possibly at [facility]  improving at this point   BMP reviewed- noted Estimated Creatinine Clearance: 32.3 mL/min (A) (based on SCr of 2.3 mg/dL (H)). according to latest data.   Monitor UOP and serial BMP and adjust therapy as needed. Renally dose meds.  Results for XINTANG CRESPO XIN SANCHEZ (MRN [id number]) as of 10/24/2020 16:35

## 2020-10-27 NOTE — PROGRESS NOTES
" INPATIENT NEPHROLOGY PROGRESS  [facility] NEPHROLOGY    Alin Burkett  10/27/2020    Reason for consultation:  Acute kidney injury     History of Present Illness:  Patient has been having diarrhea with "jelly-like" consistency as well as crampiness in the abdomen.  Symptoms began roughly one week ago.  Also has had fever and malaise.  Appetite poor.  Fatigue as well.  He has been receiving chemo for PTLD; most recent cycle of CHOP was end of last week.  He has history of renal transplant four years ago and is currently on twice-a-day  Prograf.  He's had no cough, no unusual headache, no sinus pain, and no burning on urination.     10/20  Has some diarrhea and abdominal pain.  No nausea, chest pain, sob, new neuro symptoms, new joint pain.  He is very weak.    I told him that he had previously been seen by doctor Shonda and I would call him to see him.  He stated he didn't want to see Dr Merchant and requested that I become his nephrologist.    10/21  Not much change in renal function since yesterday. UOP 1.3L.  Sleeping quietly.  10/23  Sleeping.  1550 urine output.    10/24 VSS, no new complains. Appreciate ID and Heme input.  10/25 VSS, no new complains. sCr is better.  10/26 VSS, no new complains. Labs are acceptable. Lethargic at times.  10/27 VSS, no new complains.    Plan of Care:    Acute kidney injury likely hemodynamically mediated, c.diff colitis  Kidney transplant  --urine sodium low implicating renal hypoperfusion   --avoid NSAIDS, Sales II inhibitors, and other non-essential nephrotoxic agents  --renal dose medication for crcl 10-50  --keep map above 55  --treat c.diff, appreciate ID input  --continue IS for kidney transplant.    Nongap metabolic acidosis likely combination of GI losses and renal tubular defect (urine pH inappropriately high)  --urine anion gap not accurate due to low urine sodium     PTLD lymphoma, s/p renal transplant  Anemia  --tacrolimus level therapeutic  --appreciate Heme " input    Hyponatremia  --better  --avoid hypotonic iv piggy backs  --no ssri antidepressants or thiazide diuretics    Thank you for allowing us to participate in this patient's care. We will continue to follow.    Vital Signs:  Temp Readings from Last 3 Encounters:   10/27/20 98.7 °F (37.1 °C)   10/10/20 98.3 °F (36.8 °C) (Oral)   10/09/20 98.6 °F (37 °C)       Pulse Readings from Last 3 Encounters:   10/27/20 (!) 112   10/10/20 95   10/09/20 (!) 56       BP Readings from Last 3 Encounters:   10/27/20 (!) 84/61   10/10/20 102/63   10/09/20 115/76       Weight:  Wt Readings from Last 3 Encounters:   10/24/20 83.5 kg (184 lb 1.4 oz)   10/10/20 68.5 kg (151 lb)   10/09/20 66.4 kg (146 lb 6.2 oz)       Past Medical & Surgical History:  Past Medical History:   Diagnosis Date    Acidosis     Adrenal adenoma     Anemia associated with chronic renal failure     Arrhythmia, onset 2015    Awaiting organ transplant status 2013    Basal cell carcinoma 2012    left nasal tip    Blood type B+ 2013    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  Renal allograft biopsy 17 (DIVINE): 21 glomeruli, none globally sclerosed, <5% interstitial fibrosis, no ACR, c4d negative, AVR CCT Type 2 (V1 lesion); plan THYMO     Dissecting aortic aneurysm (any part), abdominal     Diverticulosis     Encounter for blood transfusion     ESRD (end stage renal disease) 2010    H/O urethral stricture 2018    H/O: urethral stricture     History of AAA (abdominal aortic aneurysm) repair     History of urethral stricture 2018    Hypertension     Hypokalemia     Hypothyroidism 1/10/2014    Inguinal hernia bilateral, non-recurrent      Kidney stones     Organ transplant candidate 11/26/2013    Plantar warts 1/10/2014    Recurrent UTI 7/28/2017    S/P kidney transplant     Secondary hyperparathyroidism, renal     Sepsis 10/24/2020    Thyroid disease        Past Surgical History:   Procedure Laterality Date    ABDOMINAL SURGERY      exploratory lapatomy x 2    ABLATION N/A 8/22/2019    Procedure: ABLATION, SVT;  Surgeon: Emerson Mcmanus MD;  Location: [facility] EP LAB;  Service: Cardiology;  Laterality: N/A;  SVT, RFA, CARTO, anes, GP, 323    AORTA - SUPERIOR MESENTERIC ARTERY BYPASS GRAFT      BLADDER NECK RECONSTRUCTION      BLADDER SURGERY      COLONOSCOPY  10/10/2013    Dr. Gutierrez, repeat in 5 years    CYSTOSCOPY      CYSTOSCOPY N/A 12/19/2018    Procedure: CYSTOSCOPY;  Surgeon: Dewey Mann MD;  Location: [address];  Service: Urology;  Laterality: N/A;  45 min    CYSTOURETHROSCOPY WITH DIRECT VISION INTERNAL URETHROTOMY N/A 12/19/2018    Procedure: CYSTOSCOPY, WITH DIRECT VISION INTERNAL URETHROTOMY;  Surgeon: Dewey Mann MD;  Location: [address];  Service: Urology;  Laterality: N/A;    DILATION OF URETHRA N/A 12/19/2018    Procedure: DILATION, URETHRA;  Surgeon: Dewey Mann MD;  [facility];  Service: Urology;  Laterality: N/A;    EXCISIONAL BIOPSY N/A 7/13/2020    Procedure: EXCISIONAL BIOPSY- LEFT INGUINAL NODE;  Surgeon: Vlad Epstein MD;  Location: [address];  Service: General;  Laterality: N/A;    FLEXIBLE CYSTOSCOPY N/A 11/6/2019    Procedure: CYSTOSCOPY, FLEXIBLE;  Surgeon: Dewey Mnan MD;  Location: [facility] OR [address] [place] [address];  Service: Urology;  Laterality: N/A;    GASTROJEJUNOSTOMY      HEMORRHOID SURGERY      HERNIA REPAIR      INSERTION OF VENOUS ACCESS PORT Left 7/27/2020    Procedure: INSERTION, VENOUS ACCESS PORT;  Surgeon: Vlad Epstein MD;  Location: [facility] OR [address] [place] [address];  Service: General;  Laterality: Left;    KIDNEY TRANSPLANT      LEFT HEART CATHETERIZATION  Left 8/20/2019    Procedure: Left heart cath;  Surgeon: Javan Oscar MD;  Location[facility] CATH/EP LAB;  Service: Cardiology;  Laterality: Left;    LITHOTRIPSY      LYMPH NODE BIOPSY N/A 6/30/2020    Procedure: BIOPSY, LYMPH NODE;  Surgeon: Ella Diagnostic Provider;  Location: [facility] OR [address] [place] [address];  Service: General;  Laterality: N/A;  189 lymph node biopsy /ULTRASOUND    PERCUTANEOUS NEPHROLITHOTRIPSY      right  ESWL  10/31/12    right ESWL  6/26/12    URETHROPLASTY USING PATCH GRAFT N/A 11/6/2019    Procedure: URETHROPLASTY, USING PATCH GRAFT BUCCAL MUCOSA GRAFT;  Surgeon: Dewey Mann MD;  Location: [facility] OR [address] [place] [address];  Service: Urology;  Laterality: N/A;  3 HOURS       Past Social History:  Social History     Socioeconomic History    Marital status: Single     Spouse name: Not on file    Number of children: Not on file    Years of education: Not on file    Highest education level: Not on file   Occupational History     Employer: Disabled   Social Needs    Financial resource strain: Not on file    Food insecurity     Worry: Not on file     Inability: Not on file    Transportation needs     Medical: Not on file     Non-medical: Not on file   Tobacco Use    Smoking status: Former Smoker     Packs/day: 0.50     Years: 40.00     Pack years: 20.00     Types: Cigarettes     Quit date: 6/16/2010     Years since quitting: 10.3    Smokeless tobacco: Never Used   Substance and Sexual Activity    Alcohol use: Yes     Alcohol/week: 3.0 standard drinks     Types: 3 Cans of beer per week     Comment: occasional/social    Drug use: No     Comment: THC in youth    Sexual activity: Yes     Partners: Female     Birth control/protection: None   Lifestyle    Physical activity     Days per week: Not on file     Minutes per session: Not on file    Stress: Not on file   Relationships    Social connections     Talks on phone: Not on file     Gets together: Not on file     Attends Denominational service: Not on file      Active member of club or organization: Not on file     Attends meetings of clubs or organizations: Not on file     Relationship status: Not on file   Other Topics Concern    Not on file   Social History Narrative    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      Current Outpatient Medications on File Prior to Encounter   Medication Sig Dispense Refill    allopurinoL (ZYLOPRIM) 300 MG tablet Take 1 tablet (300 mg total) by mouth once daily. 30 tablet 2    aspirin (ECOTRIN) 81 MG EC tablet Take 1 tablet (81 mg total) by mouth once daily.  0    calcitRIOL (ROCALTROL) 0.5 MCG Cap Take 1 capsule (0.5 mcg total) by mouth once daily. 30 capsule 11    cefpodoxime (VANTIN) 100 MG tablet Take 1 tablet (100 mg total) by mouth every 12 (twelve) hours. 60 tablet 3    COMBIGAN 0.2-0.5 % Drop Place 1 drop into both eyes 2 (two) times a day.       famotidine (PEPCID) 20 MG tablet TAKE 1 TABLET EVERY EVENING (Patient taking differently: Take 20 mg by mouth every evening. ) 90 tablet 3    ketoconazole (NIZORAL) 200 mg Tab TAKE ONE-HALF (1/2) TABLET ONCE DAILY (Patient taking differently: Take 100 mg by mouth once daily. ) 45 tablet 3    levothyroxine (SYNTHROID) 100 MCG tablet TAKE 1 TABLET DAILY (Patient taking differently: Take 100 mcg by mouth before breakfast. Administer on an empty stomach at least 30 minutes before food. If receiving tube feeds, HOLD tube feeds for 1 hour before and after levothyroxine administration.) 90 tablet 3    magnesium oxide (MAG-OX) 400 mg (241.3 mg magnesium) tablet Take 1 tablet (400 mg total) by mouth once daily. 90 tablet 3    multivitamin (ONE DAILY MULTIVITAMIN) per tablet Take 1 tablet by mouth once daily.      ondansetron (ZOFRAN-ODT) 8 MG TbDL Dissolve 1 tablet (8 mg total) by mouth every 12 (twelve) hours as needed. (Patient taking differently: Take 8 mg by mouth every 12 (twelve)  "hours as needed (nausea). ) 21 tablet 1    predniSONE (DELTASONE) 50 MG Tab Take 2 tablets (100 mg total) by mouth once daily. Take on days 2-5 of your chemotherapy cycles. 8 tablet 0    sodium bicarbonate 650 MG tablet Take 1 tablet (650 mg total) by mouth 2 (two) times daily. 540 tablet 3    tacrolimus (PROGRAF) 1 MG Cap Take 3 capsules (3 mg total) by mouth every morning AND 2 capsules (2 mg total) every evening. Z94.0/Kidney Transplant on 11/26/16. 150 capsule 11    travoprost (TRAVATAN Z) 0.004 % ophthalmic solution Place 1 drop into both eyes every evening.        Scheduled Meds:   allopurinoL  100 mg Oral Daily    aspirin  81 mg Oral Daily    brimonidine-timoloL  1 drop Both Eyes Q12H    calcitRIOL  0.5 mcg Oral Daily    cholestyramine-aspartame  1 packet Oral TID    dicyclomine  10 mg Oral QID    dronabinoL  2.5 mg Oral BID    famotidine  20 mg Oral QHS    ferrous sulfate  325 mg Oral BID    levothyroxine  100 mcg Oral Before breakfast    metronidazole  500 mg Intravenous Q8H    midodrine  10 mg Oral TID    tacrolimus  2 mg Oral Daily PM    tacrolimus  3 mg Oral Daily AM    travoprost  1 drop Both Eyes QHS    vancomycin  125 mg Oral Q6H     Continuous Infusions:    PRN Meds:.acetaminophen, albuterol-ipratropium, hyoscyamine, magnesium sulfate IVPB, magnesium sulfate IVPB, metoclopramide HCl, morphine, ondansetron, potassium chloride in water **AND** potassium chloride in water **AND** potassium chloride in water, promethazine, sodium chloride 0.9%    Allergies:  Patient has no known allergies.    Past Family History:  Reviewed; refer to Hospitalist Admission Note    Review of Systems:  Review of Systems - All 14 systems reviewed and negative, except as noted in HPI    Physical Exam:    BP (!) 84/61   Pulse (!) 112   Temp 98.7 °F (37.1 °C)   Resp 18   Ht 5' 11" (1.803 m)   Wt 83.5 kg (184 lb 1.4 oz)   SpO2 97%   BMI 25.67 kg/m²     General Appearance:    Alert, cooperative, no " distress, appears stated age   Head:    Normocephalic, without obvious abnormality, atraumatic   Eyes:    PER, conjunctiva/corneas clear, EOM's intact in both eyes        Throat:   Lips, mucosa, and tongue normal; teeth and gums normal   Back:     Symmetric, no curvature, ROM normal, no CVA tenderness   Lungs:     Clear to auscultation bilaterally, respirations unlabored   Chest wall:    No tenderness or deformity   Heart:    Regular rate and rhythm, S1 and S2 normal, no murmur, rub   or gallop   Abdomen:     Tender to palpation   Extremities:   Extremities normal, atraumatic, no cyanosis or edema   Pulses:   2+ and symmetric all extremities   MSK:   No joint or muscle swelling, tenderness or deformity   Skin:   Skin color, texture, turgor normal, no rashes or lesions   Neurologic:   CNII-XII intact, normal strength and sensation       Throughout.  No flap     Results:  Lab Results   Component Value Date     10/27/2020    K 3.4 (L) 10/27/2020     (H) 10/27/2020    CO2 12 (L) 10/27/2020    BUN 35 (H) 10/27/2020    CREATININE 2.3 (H) 10/27/2020    CALCIUM 7.3 (L) 10/27/2020    ANIONGAP 9 10/27/2020    ESTGFRAFRICA 32 (A) 10/27/2020    EGFRNONAA 28 (A) 10/27/2020       Lab Results   Component Value Date    CALCIUM 7.3 (L) 10/27/2020    PHOS 2.8 10/21/2020       Recent Labs   Lab 10/27/20  0442   WBC 5.52   RBC 2.91*   HGB 9.3*   HCT 28.8*      MCV 99*   MCH 32.0*   MCHC 32.3          I have personally reviewed pertinent radiological imaging and reports.

## 2020-10-27 NOTE — SUBJECTIVE & OBJECTIVE
Interval History: Patient's diarhea improved frequency decreased. Afebrile blood pressure stable    Review of Systems   Constitutional: Positive for fatigue. Negative for appetite change, chills and fever.   HENT: Negative for congestion, hearing loss, rhinorrhea, sore throat, trouble swallowing and voice change.    Respiratory: Negative for cough, chest tightness, shortness of breath and wheezing.    Cardiovascular: Negative for chest pain, palpitations and leg swelling.   Gastrointestinal: Positive for abdominal pain and diarrhea. Negative for blood in stool, nausea and vomiting.   Genitourinary: Negative for difficulty urinating, frequency, hematuria and urgency.   Musculoskeletal: Negative for back pain, joint swelling and neck stiffness.   Skin: Negative for pallor and rash.   Neurological: Negative for tremors, seizures, syncope, speech difficulty, weakness, numbness and headaches.   Hematological: Negative for adenopathy.   Psychiatric/Behavioral: Negative for agitation, behavioral problems, confusion and sleep disturbance.     Objective:     Vital Signs (Most Recent):  Temp: 98.7 °F (37.1 °C) (10/27/20 0408)  Pulse: (!) 112 (10/27/20 0408)  Resp: 18 (10/27/20 0408)  BP: (!) 110/70  SpO2: 97 % (10/27/20 0700) Vital Signs (24h Range):  Temp:  [98.2 °F (36.8 °C)-99.8 °F (37.7 °C)] 98.7 °F (37.1 °C)  Pulse:  [] 112  Resp:  [18] 18  SpO2:  [91 %-99 %] 97 %  BP: ()/(53-68) 84/61     Weight: 83.5 kg (184 lb 1.4 oz)  Body mass index is 25.67 kg/m².    Intake/Output Summary (Last 24 hours) at 10/27/2020 0748  Last data filed at 10/27/2020 0600  Gross per 24 hour   Intake 1160 ml   Output 3 ml   Net 1157 ml      Physical Exam  Vitals signs and nursing note reviewed.   Constitutional:       General: He is not in acute distress.     Appearance: He is ill-appearing. He is not diaphoretic.   HENT:      Head: Normocephalic and atraumatic.      Mouth/Throat:      Mouth: Mucous membranes are dry.   Eyes:       General: No scleral icterus.        Right eye: No discharge.         Left eye: No discharge.      Pupils: Pupils are equal, round, and reactive to light.   Neck:      Vascular: No JVD.   Cardiovascular:      Rate and Rhythm: Normal rate and regular rhythm.   Pulmonary:      Effort: Pulmonary effort is normal.      Breath sounds: Normal breath sounds.   Abdominal:      General: Bowel sounds are normal. There is no distension.      Palpations: Abdomen is soft.      Tenderness: There is abdominal tenderness in the left upper quadrant and left lower quadrant. There is no rebound.   Musculoskeletal:      Right lower leg: No edema.      Left lower leg: No edema.   Skin:     General: Skin is warm.      Capillary Refill: Capillary refill takes less than 2 seconds.      Findings: No rash.   Neurological:      General: No focal deficit present.      Mental Status: He is alert.      Cranial Nerves: No cranial nerve deficit.   Psychiatric:         Mood and Affect: Mood normal.         Significant Labs:   BMP:   Recent Labs   Lab 10/27/20  0442   GLU 85      K 3.4*   *   CO2 12*   BUN 35*   CREATININE 2.3*   CALCIUM 7.3*     CBC:   Recent Labs   Lab 10/26/20  0416 10/27/20  0442   WBC 5.43 5.52   HGB 10.2* 9.3*   HCT 32.2* 28.8*    160       Significant Imaging: I have reviewed all pertinent imaging results/findings within the past 24 hours.

## 2020-10-27 NOTE — ASSESSMENT & PLAN NOTE
Most likely from hypovolemia.  Says component of autonomic dysfunction  Patient blood pressure improved after 1 L normal saline bolus  Will continue midodrine

## 2020-10-27 NOTE — ASSESSMENT & PLAN NOTE
Resolved Give supplement and monitor level.    Potassium   Date Value Ref Range Status   10/27/2020 3.4 (L) 3.5 - 5.1 mmol/L Final   10/26/2020 3.6 3.5 - 5.1 mmol/L Final

## 2020-10-27 NOTE — PROGRESS NOTES
"[facility] Medicine  Progress Note    Patient Name: Alin Burkett  MRN: [id number]  Patient Class: IP- Inpatient   Admission Date: 10/15/2020  Length of Stay: 12 days  Attending Physician: Julia Summers MD  Primary Care Provider: Carmen Krueger MD        Subjective:     Principal Problem:C. difficile colitis        HPI:  Patient has been having diarrhea with "jelly-like" consistency as well as crampiness in the abdomen.  Symptoms began roughly one week ago.  Also has had fever and malaise.  Appetite poor.  Fatigue as well.  He has been receiving chemo for PTLD; most recent cycle of CHOP was end of last week.  He has history of renal transplant four years ago and is currently on twice-a-day  Prograf.  He's had no cough, no unusual headache, no sinus pain, and no burning on urination.  He feels that he's probably dehydrated.    Overview/Hospital Course:  No notes on file    Interval History:  Continues to have diarrhea.  Patient was hypotensive this morning.  Was given 1 L normal saline bolus.  Patient's blood pressure responded to volume.  No other symptoms at this point    Review of Systems   Constitutional: Positive for fatigue. Negative for appetite change, chills and fever.   HENT: Negative for congestion, hearing loss, rhinorrhea, sore throat, trouble swallowing and voice change.    Respiratory: Negative for cough, chest tightness, shortness of breath and wheezing.    Cardiovascular: Negative for chest pain, palpitations and leg swelling.   Gastrointestinal: Positive for abdominal pain and diarrhea. Negative for blood in stool, nausea and vomiting.   Genitourinary: Negative for difficulty urinating, frequency, hematuria and urgency.   Musculoskeletal: Negative for back pain, joint swelling and neck stiffness.   Skin: Negative for pallor and rash.   Neurological: Negative for tremors, seizures, syncope, speech difficulty, weakness, numbness and headaches.   Hematological: " Negative for adenopathy.   Psychiatric/Behavioral: Negative for agitation, behavioral problems, confusion and sleep disturbance.     Objective:     Vital Signs (Most Recent):  Temp: 96.7 °F (35.9 °C) (10/27/20 1549)  Pulse: 73 (10/27/20 1549)  Resp: 12 (10/27/20 1549)  BP: 110/74 (10/27/20 1549)  SpO2: (!) 90 % (10/27/20 1549) Vital Signs (24h Range):  Temp:  [96.7 °F (35.9 °C)-99.1 °F (37.3 °C)] 96.7 °F (35.9 °C)  Pulse:  [] 73  Resp:  [12-18] 12  SpO2:  [90 %-97 %] 90 %  BP: ()/(53-74) 110/74     Weight: 83.5 kg (184 lb 1.4 oz)  Body mass index is 25.67 kg/m².    Intake/Output Summary (Last 24 hours) at 10/27/2020 1704  Last data filed at 10/27/2020 0600  Gross per 24 hour   Intake 1060 ml   Output 3 ml   Net 1057 ml      Physical Exam  Vitals signs and nursing note reviewed.   Constitutional:       General: He is not in acute distress.     Appearance: He is ill-appearing. He is not diaphoretic.   HENT:      Head: Normocephalic and atraumatic.      Mouth/Throat:      Mouth: Mucous membranes are dry.   Eyes:      General: No scleral icterus.        Right eye: No discharge.         Left eye: No discharge.      Pupils: Pupils are equal, round, and reactive to light.   Neck:      Vascular: No JVD.   Cardiovascular:      Rate and Rhythm: Normal rate and regular rhythm.   Pulmonary:      Effort: Pulmonary effort is normal.      Breath sounds: Normal breath sounds.   Abdominal:      General: Bowel sounds are normal. There is no distension.      Palpations: Abdomen is soft.      Tenderness: There is abdominal tenderness in the left upper quadrant and left lower quadrant. There is no rebound.   Musculoskeletal:      Right lower leg: No edema.      Left lower leg: No edema.   Skin:     General: Skin is warm.      Capillary Refill: Capillary refill takes less than 2 seconds.      Findings: No rash.   Neurological:      General: No focal deficit present.      Mental Status: He is alert.      Cranial Nerves: No  cranial nerve deficit.   Psychiatric:         Mood and Affect: Mood normal.         Significant Labs:   BMP:   Recent Labs   Lab 10/27/20  0442   GLU 85      K 3.4*   *   CO2 12*   BUN 35*   CREATININE 2.3*   CALCIUM 7.3*     CBC:   Recent Labs   Lab 10/26/20  0416 10/27/20  0442   WBC 5.43 5.52   HGB 10.2* 9.3*   HCT 32.2* 28.8*    160       Significant Imaging: I have reviewed all pertinent imaging results/findings within the past 24 hours.      Assessment/Plan:      * C. difficile colitis  Source is likely colitis from C.difficile.   Map appears to be above 70.  Will continue oral vancomycin and IV metronidazole  Patient condition improving at this point  As per infectious disease will benefit from LTAC placement and will need vancomycin and metronidazole for 2 weeks      Microbiology Results (last 7 days)     Procedure Component Value Units Date/Time    Urine Culture High Risk [[id number]] Collected: 10/20/20 0022    Order Status: Completed Specimen: Urine, Clean Catch Updated: 10/21/20 1928     Urine Culture, Routine No growth    Narrative:      Indicated criteria for high risk culture:->Other  Other (specify):->Neutropenic patient    Blood culture x two cultures. Draw prior to antibiotics. [[id number]] Collected: 10/15/20 1626    Order Status: Completed Specimen: Blood from Peripheral, Right Hand Updated: 10/21/20 0612     Blood Culture, Routine No growth after 5 days.    Narrative:      Aerobic and anaerobic    Blood culture x two cultures. Draw prior to antibiotics. [[id number]] Collected: 10/15/20 1546    Order Status: Completed Specimen: Blood from Antecubital, Right Arm Updated: 10/21/20 0612     Blood Culture, Routine No growth after 5 days.    Narrative:      Aerobic and anaerobic            Acute renal failure superimposed on stage 3 chronic kidney disease  Creatinine stable  Possibly at ATN    BMP reviewed- noted Estimated Creatinine Clearance: 32.3 mL/min (A) (based on SCr of 2.3  mg/dL (H)). according to latest data.   Monitor UOP and serial BMP and adjust therapy as needed. Renally dose meds.  Results for TANG LAUREN (MRN [id number]) as of 10/24/2020 16:35        Hypotension  Most likely from hypovolemia.  Says component of autonomic dysfunction  Patient blood pressure improved after 1 L normal saline bolus  Will continue midodrine      Iron deficiency  History noted  Will follow Hematology recommendation      Moderate malnutrition  Nutrition consulted. Body mass index is 25.67 kg/m².. Encourage maximal PO intake. Diet supplementation ordered per nutrition approval. Will encourage PO and monitor closely for weight changes.      Anemia in stage 3 chronic kidney disease  Patient's anemia is currently controlled. Has not received any PRBCs to date.. Etiology likely d/t  anemia of chronic disease  Current CBC reviewed-   Lab Results   Component Value Date    HGB 9.3 (L) 10/27/2020    HCT 28.8 (L) 10/27/2020     Monitor serial CBC and transfuse if patient becomes hemodynamically unstable, symptomatic or H/H drops below 7/21.         Anemia due to chemotherapy  Patient's anemia is currently controlled. Has not received any PRBCs to date.. Etiology likely d/t chemo no also has iron-deficiency anemia, possibly has GI bleed  Continue ferrous sulfate 325mg PO BID  Current CBC reviewed-   Lab Results   Component Value Date    HGB 9.3 (L) 10/27/2020    HCT 28.8 (L) 10/27/2020     Monitor serial CBC and transfuse if patient becomes hemodynamically unstable, symptomatic or H/H drops below 7/21.         Hypokalemia due to excessive gastrointestinal loss of potassium  Resolved Give supplement and monitor level.    Potassium   Date Value Ref Range Status   10/27/2020 3.4 (L) 3.5 - 5.1 mmol/L Final   10/26/2020 3.6 3.5 - 5.1 mmol/L Final         Febrile neutropenia  Improved  Monitor leukocyte count.  Hematology on the case  Granix given today.;    WBC   Date Value Ref Range Status   10/27/2020 5.52  3.90 - 12.70 K/uL Final       Post-transplant lymphoproliferative disorder (PTLD)  Has been receiving chemo for this.      -donor kidney transplant  Continue Prograf at usual dose.      Acquired hypothyroidism  Continue levothyroxine.    Metabolic acidosis  Acidosis persistent  Results for TANG LAUREN (MRN [id number]) as of 10/24/2020 16:35   Ref. Range 10/24/2020 03:50   CO2 Latest Ref Range: 23 - 29 mmol/L 13 (L)   Anion Gap Latest Ref Range: 8 - 16 mmol/L 10      Was most likely secondary to diarrhea  Nephrology on the case  Will continue sodium bicarb      VTE Risk Mitigation (From admission, onward)         Ordered     IP VTE LOW RISK PATIENT  Once      10/15/20 2220                Discharge Planning   TRINH: 10/26/2020     Code Status: Full Code   Is the patient medically ready for discharge?: Yes    Reason for patient still in hospital (select all that apply): Patient trending condition and Treatment  Discharge Plan A: Home with family                  Julia Summers MD  Department of Hospital Medicine   [facility]

## 2020-10-27 NOTE — PLAN OF CARE
10/27/20 0700   Patient Assessment/Suction   Respiratory Effort Normal;Unlabored   Expansion/Accessory Muscles/Retractions expansion symmetric;no retractions   All Lung Fields Breath Sounds Anterior:;Lateral:   Rhythm/Pattern, Respiratory unlabored   PRE-TX-O2   O2 Device (Oxygen Therapy) room air   SpO2 97 %   Pulse Oximetry Type Intermittent   $ Pulse Oximetry - Multiple Charge Pulse Oximetry - Multiple   Aerosol Therapy   $ Aerosol Therapy Charges PRN treatment not required

## 2020-10-27 NOTE — ASSESSMENT & PLAN NOTE
Patient's anemia is currently controlled. Has not received any PRBCs to date.. Etiology likely d/t chemo no also has iron-deficiency anemia, possibly has GI bleed  Continue ferrous sulfate 325mg PO BID  Current CBC reviewed-   Lab Results   Component Value Date    HGB 9.3 (L) 10/27/2020    HCT 28.8 (L) 10/27/2020     Monitor serial CBC and transfuse if patient becomes hemodynamically unstable, symptomatic or H/H drops below 7/21.

## 2020-10-27 NOTE — PROGRESS NOTES
"[facility]  Adult Nutrition  Progress Note    SUMMARY     Intervention: general healthful diet, nutrition education, parenteral nutrition therapy, and prescription medication- appetite stimulant      Recommendation:   1) Encourage PO intakes on regular diet with boost pudding @ meals     2) supplemental nutrition support needed: Pt refused NGT, continue PPN short term  D 5 AA 4.25 @ 85 ml/hr + standard lipids  ( 1192 kcal ( 62% EEN), 86 g protein ( 100% EPN))    3) If nutrition support needed longterm discuss PEG with pt  Isosource 1.5 @ 20 ml/hr advancing to goal of 50 ml/hr + 180 ml flush q 4 hr  ( provides 81 g protein ( 100% EEN), 1800 kcal ( 94% EEN), and 912 ml free water)     Goals: 1) PO intakes 50% of meals and supplements at f/u 2) PO intakes > 50% of meals of supplemental nutrition support continues  Nutrition Goal Status: not met/ new  Communication of RD Recs: reviewed with physician(POC, sticky note, second sign)     Reason for Assessment     Reason For Assessment: follow up  Diagnosis: (septic shock)  Relevant Medical History: PTLD s/p chemo, CKD 3, renal transplant 4 years ago  Interdisciplinary Rounds: attended     General Information Comments: 71 y/o male admitted with sepsis, neutropenia as well as diarrhea/ cramps x 2 weeks as well as fever, poor appetite, and malaise x 1 week. Last chemo, CHOP, end of last week. NFPE done 10/20/20 mild-severe wasting seen. Pt within UBW range. Tells me he has had a, "poor appetite for years," worse this past week. Only ate 2 bites of his oatmeal.  10/21 Pt not eating on regular diet, poor appetite parenteral nutrition initiated as pt declined NGT  10/26  Pt still c/o poor appetite despite appetite stimulant, PO intakes 0-25%. PPN reinitiated per discussion with MD, rec. Discussion about longterm nutrition support with pt. Diarrhea improving.     Nutrition Discharge Planning: to be determined- Regular diet + boost pudding TID per pt " "request     Nutrition Risk Screen     Nutrition Risk Screen: no indicators present     Nutrition/Diet History     Spiritual, Cultural Beliefs, Holiness Practices, Values that Affect Care: no  Food Allergies: NKFA  Factors Affecting Nutritional Intake: decreased appetite, abdominal pain     Anthropometrics    Height Method: Measured(10/2/20)  Height: 5' 11"  Height (inches): 71 in  Weight Method: Bed Scale  Weight: 83.5 10/24, 68.1 kg (admission)  Weight (lb): 184 lb ( edema noted)  Ideal Body Weight (IBW), Male: 172 lb  BMI (Calculated): 20.9 admission  BMI Grade: 18.5-24.9 - normal  Usual Body Weight (UBW), k kg(20)  Weight Change Amount: (65.8 kg 10/2/20 and 10/30/19)     Lab/Procedures/Meds     Pertinent Labs Reviewed: reviewed  BMP  Lab Results   Component Value Date     10/27/2020    K 3.4 (L) 10/27/2020     (H) 10/27/2020    CO2 12 (L) 10/27/2020    BUN 35 (H) 10/27/2020    CREATININE 2.3 (H) 10/27/2020    CALCIUM 7.3 (L) 10/27/2020    ANIONGAP 9 10/27/2020    ESTGFRAFRICA 32 (A) 10/27/2020    EGFRNONAA 28 (A) 10/27/2020     No results for input(s): POCTGLUCOSE in the last 24 hours.    Lab Results   Component Value Date    ALBUMIN 1.4 (L) 10/21/2020       Pertinent Medications Reviewed: reviewed  Calcitriol, dronabinol, iron, Mg sulfate, zofran, KCl, promethazine     Estimated/Assessed Needs     Weight Used For Calorie Calculations: 68.1 kg (estimated dry weight)  Energy Calorie Requirements (kcal): MSJ ( x 1.3-1.4 wt maintenance) = 2267-4392 kcal  Energy Need Method: [place], Kcal/kg  Protein Requirements: 1.2 g protein/kg ( CA, wasting, vs. CKD) = 81 g protein  Weight Used For Protein Calculations: 68.1 kg (150 lb 2.1 oz)  Fluid Requirements (mL): 2000 ml or per MD  Estimated Fluid Requirement Method: RDA Method  CHO Requirement: N/A        Nutrition Prescription Ordered     Current Diet Order: Regular + boost pudding TID + PPN above     Evaluation of Received Nutrient/Fluid " Intake     Energy Calories Required: sometimes meeting needs  Protein Required: meeting needs  Fluid Required: exceeds needs  Total Fluid Intake (mL/kg): PPN @ 85 ml/hr  Tolerance: tolerating  % Intake of Estimated Energy Needs: 65-85%  % Meal Intake: 0-25% + PPN     Nutrition Risk     Level of Risk/Frequency of Follow-up: moderate 2 x weekly      Assessment and Plan     Moderate malnutrition  Contributing Nutrition Diagnosis  Moderate chronic illness related malnutrition     Related to (etiology):   Increased needs d/t CA and decreased appetite     Signs and Symptoms (as evidenced by):   1) PO intakes < 75% x > 1 month  2) mild-moderate muscle/ fat wasting as charted below     Interventions:  Above     Nutrition Diagnosis Status:   continues       Monitor and Evaluation     Food and Nutrient Intake: energy intake, food and beverage intake  Food and Nutrient Adminstration: diet order, parenteral nutrition therapy  Anthropometric Measurements: weight  Biochemical Data, Medical Tests and Procedures: electrolyte and renal panel, gastrointestinal profile, glucose, GI function  Nutrition-Focused Physical Findings: overall appearance      Malnutrition Assessment  Malnutrition Type: chronic illness  Skin (Micronutrient): (Brandon = 12)  Teeth (Micronutrient): (missing some)   Micronutrient Evaluation: suspected deficiency  Micronutrient Evaluation Comments: Na, check iron   Energy Intake (Malnutrition): less than 75% for greater than or equal to 1 month   Orbital Region (Subcutaneous Fat Loss): moderate depletion  Upper Arm Region (Subcutaneous Fat Loss): moderate depletion  Thoracic and Lumbar Region: mild depletion   [place] Region (Muscle Loss): moderate depletion(severe buccal)  Clavicle Bone Region (Muscle Loss): moderate depletion  Clavicle and Acromion Bone Region (Muscle Loss): moderate depletion  Scapular Bone Region (Muscle Loss): moderate depletion  Dorsal Hand (Muscle Loss): mild depletion  Patellar Region  (Muscle Loss): well nourished  Anterior Thigh Region (Muscle Loss): well nourished  Posterior Calf Region (Muscle Loss): well nourished     Edema: 1-3+  Subcutaneous Fat Loss (Final Summary): moderate protein-calorie malnutrition  Muscle Loss Evaluation (Final Summary): moderate protein-calorie malnutrition       Nutrition Follow-Up       yes

## 2020-10-27 NOTE — ASSESSMENT & PLAN NOTE
Contributing Nutrition Diagnosis  Moderate chronic illness related malnutrition    Related to (etiology):   Increased needs d/t CA and decreased appetite    Signs and Symptoms (as evidenced by):   1) PO intakes < 75% x > 1 month  2) mild-moderate muscle/ fat wasting as charted below    Interventions:  Above    Nutrition Diagnosis Status:   continues

## 2020-10-27 NOTE — PROGRESS NOTES
"Progress Note  Infectious Disease    Reason for Consult:  C difficile colitis, neutropenia, sepsis    HPI: Alin Burkett is a   70 y.o. male who is status post renal transplant and is receiving chemotherapy for diffuse large B-cell lymphoma, presented to the emergency room on 10/15 with worsening of chronic diarrhea, abdominal cramps of 1 weeks duration.  He was found to be neutropenic with a white blood cell count of 0.2, volume depleted, hypokalemic, was cultured and given fluid resuscitation and vancomycin and cefepime.  He was admitted to the intensive care unit. He was recently given cefpodoxime to take prophylactically associated with his chemotherapy for recurring urinary tract infections.  Most recent positive urine culture was September 18th with E coli sensitive to 3rd generation cephalosporins carbapenems  He has had a hectic fever, stool for C difficile toxin was obtained and was resulted as positive today.  he has had a positive C difficile test before, August 2019.  White blood cells remain depressed at 0.1, platelets are depressed at 91,000, creatinine 1.6.  CT scan of the abdomen obtained last night shows severe proctocolitis without megacolon. Neupogen has been ordered. He is discouraged from eating by severe cramps.      10/17/2020 tmax is improved. Continues to be neutropenic Continues to have cramping, intermittent, abdominal pain. + diarrhea "lost count how many" He had refused vancomycin in am but decided to take it now  10/18/2020 afebrile,(103 on 16th)  ANC improved on granix 0---1029) he is feeling much better today.  Less abdominal pain.  Less loose stools.  10/19/2020.  Afebrile.  T-max 99°.  Less need for pressors.  WBC 5.  Creatinine slightly worse at 1.9.  Discussed with nurse.  Patient may have had some hallucinations earlier  10/20/2020.  Afebrile. Less abdominal pain. Less diarrhea. He feels his abdomen is still distended and does not allow him to take a deep breath. KUB  Is read " as below, I feel that transvere colon is a little bigger today. Creatinine has worsened. Bicarb is 9. Nephrologist is giving bicarb drip.  Las Vancomycin iv and cefepime were  given on 18   Tolerating vancomycin by mouth and metronidazole    10/21: interim reviewed d/w Dr. Jiménez. Requiring bicarb drip. WBC 14 after neupogen, platelets better. Cr stable. Severe hypoalbuminemia. KUB not ominous, CXR with blunted left CPA. Wife reports he is sleeping all of the time and not eating. I aroused him and he agreed to eat some pudding  10/22: interim reviewed. Renal function slightly worse, bicarb corrected to 18 on drip. Urine output is poor and incontinent, midodrine started. Not eating.   10/23:  Interim reviewed, afebrile, oxygenating normally, off norepinephrine since administration of my do drain.  Stools becoming more solid, 2 measured since 0 700, still having incontinence.  Able to do some physical therapy exercises but physical therapy recommends LTAC/rehab/SNF.  Urine output is good, creatinine has stabilized 2.8, white blood cells normal.  CO2 still low at 15.  No new imaging. Slept during visit, discussed with RN and wife.   10/24:  Afebrile, oxygenating well, sleeping and not eating, urine output is very good, creatinine improved, stools with improving form  10/25: low grade temp this afternoon. No stools recorded yet but nursing reports 10 small stools. Patient reports urinary incontinence but waiting until urge before trying to void. Offered condom catheter.. Creatinine a little better. No appetite. Very irritable about being pushed to eat.  10/26: afebrile. Continues to have incontinent loose stools. Declining much physical therapy. Referral sent to LTAC. Cr better. Questran was discontinued, not sure why. He is asleep and I did not disturb, but spoke with his brother.   10/27:alert and interactive this evening. After discussion, he intends to try to eat(he fears cramping) and get up to a chair tomorrow. D/w  patient and wife about LTAC. Discouraged going home with TPN.     Antibiotics (From admission, onward)    Start     Stop Route Frequency Ordered    10/23/20 1800  vancomycin 25 mg/mL oral solution 125 mg  (C. difficile Infection (CDI) Treatment Order Panel)      11/06 1759 Oral Every 6 hours 10/23/20 1623    10/16/20 1900  metronidazole IVPB 500 mg      -- IV Every 8 hours (non-standard times) 10/16/20 1755             EXAM & DIAGNOSTICS REVIEWED:   Vitals:     Temp:  [96.7 °F (35.9 °C)-99.1 °F (37.3 °C)]   Temp: 96.7 °F (35.9 °C) (10/27/20 1549)  Pulse: 73 (10/27/20 1549)  Resp: 12 (10/27/20 1549)  BP: 110/74 (10/27/20 1549)  SpO2: (!) 90 % (10/27/20 1549)    Intake/Output Summary (Last 24 hours) at 10/27/2020 1808  Last data filed at 10/27/2020 0600  Gross per 24 hour   Intake 1060 ml   Output 3 ml   Net 1057 ml        General:  In NAD.  Alert and attends and is appropriate though still a little withdrawn  Eyes:   anicteric, EOMI  ENT:   no oral lesions  Neck:  Supple   Lungs: Clear, no consolidation, rales, wheezes, rub  Heart:  RRR, no gallop/murmur/rub noted  Abd:  Soft, no guarding, active BS, no masses or organomegaly appreciated.  No tenderness. Rectal tube in place with liquid stool  :  Voids   no flank tenderness  Musc:  Joints without effusion, swelling, erythema, synovitis,    Skin:  No rashes.    Wound:   Neuro: Alert, conversant, non focal.    Psych:  Calm, mildly withdrawn but more engaged in his own care.    Extrem: generalized edema, no erythema, phlebitis, cellulitis, warm and well perfused  VAD:  portacath without redness, drainage    Isolation:  Special contact         General Labs reviewed:  Recent Labs   Lab 10/25/20  0434 10/26/20  0416 10/27/20  0442   WBC 7.92 5.43 5.52   HGB 10.5* 10.2* 9.3*   HCT 31.2* 32.2* 28.8*    171 160       Recent Labs   Lab 10/20/20  2352  10/25/20  0434 10/26/20  0416 10/27/20  0442   *   < > 135* 136 138   K 4.3   < > 3.8 3.6 3.4*   *   < >  111* 115* 117*   CO2 12*   < > 13* 12* 12*   BUN 37*   < > 44* 36* 35*   CREATININE 2.5*   < > 2.3* 2.2* 2.3*   CALCIUM 7.9*   < > 8.0* 7.8* 7.3*   PROT 4.2*  --   --   --   --    BILITOT 0.5  --   --   --   --    ALKPHOS 154*  --   --   --   --    ALT 18  --   --   --   --    AST 21  --   --   --   --     < > = values in this interval not displayed.     Micro:  Microbiology Results (last 7 days)     Procedure Component Value Units Date/Time    Urine Culture High Risk [[id number]] Collected: 10/20/20 0022    Order Status: Completed Specimen: Urine, Clean Catch Updated: 10/21/20 1928     Urine Culture, Routine No growth    Narrative:      Indicated criteria for high risk culture:->Other  Other (specify):->Neutropenic patient    Blood culture x two cultures. Draw prior to antibiotics. [[id number]] Collected: 10/15/20 1626    Order Status: Completed Specimen: Blood from Peripheral, Right Hand Updated: 10/21/20 0612     Blood Culture, Routine No growth after 5 days.    Narrative:      Aerobic and anaerobic    Blood culture x two cultures. Draw prior to antibiotics. [[id number]] Collected: 10/15/20 1546    Order Status: Completed Specimen: Blood from Antecubital, Right Arm Updated: 10/21/20 0612     Blood Culture, Routine No growth after 5 days.    Narrative:      Aerobic and anaerobic        Imaging Reviewed:   KUBThree round radiodensities overlie the left upper quadrant may represent ingested pills or be in the patient's clothing.  A radiodense thread is noted over the right lower quadrant of uncertain significance.   CXR clear   CT abdomen and pelvis 10/16 :    Findings consistent with severe proctocolitis.    Transplanted kidney right side of the pelvis with mild pelvocaliectasis and perinephric stranding nonspecific.  No calcified stones seen Additional findings as detailed above including cystic lesion with in the native right kidney versus dilatation of collecting structure of the native right kidney.  Stones in the  native right kidney.  Cystic lesion within the pancreas  Cardiology:    IMPRESSION & PLAN   1. Severe C. Difficile colitis, improved, but still having diarrhea despite aggressive treatment and questran   Prompted by oral antibiotics and/or chemotherapy   History of Cdiff 8/2019   Metabolic acidosis,improved    2. PTLD, EBV related lymphoma, receiving chemotherapy      neutropenia, resolved  3. S/p renal transplant on immunosuppression, [facility], Cr   improving  4. Chronic diarrhea  5. History of recurrent UTIs  6. anorexia    Recommendations:     IV flagyl (untl discharge) and   oral vancomyin   125 mg Q 6 for Cdiff for least another 2 weeks    TID cholestyramine(  and probiotic     .  out of bed as able     appropriate for LTAC, not making nearly enough progress to go home, but wife adamant that she will have enough support. Counseled that going home on TPN is complex and puts his PAC at risk for infection and dehydration risks injury to his kidney transplant.

## 2020-10-27 NOTE — ASSESSMENT & PLAN NOTE
Acidosis persistent  Results for XIN TANG SANCHEZ (MRN [id number]) as of 10/24/2020 16:35   Ref. Range 10/24/2020 03:50   CO2 Latest Ref Range: 23 - 29 mmol/L 13 (L)   Anion Gap Latest Ref Range: 8 - 16 mmol/L 10      Was most likely secondary to diarrhea  Nephrology on the case  Will continue sodium bicarb

## 2020-10-27 NOTE — PROGRESS NOTES
"[facility]  Progress Note    Patient Name: Alin Burkett  MRN: [id number]  Patient Class: IP- Inpatient   Admission Date: 10/15/2020  Length of Stay: 12 days  Attending Physician: Julia Summers MD  Primary Care Provider: Carmen Krueger MD        Subjective:     Principal Problem:C. difficile colitis        HPI:  Patient has been having diarrhea with "jelly-like" consistency as well as crampiness in the abdomen.  Symptoms began roughly one week ago.  Also has had fever and malaise.  Appetite poor.  Fatigue as well.  He has been receiving chemo for PTLD; most recent cycle of CHOP was end of last week.  He has history of renal transplant four years ago and is currently on twice-a-day  Prograf.  He's had no cough, no unusual headache, no sinus pain, and no burning on urination.  He feels that he's probably dehydrated.    Overview/Hospital Course:  No notes on file    Interval History: Patient's diarhea improved frequency decreased. Afebrile blood pressure stable    Review of Systems   Constitutional: Positive for fatigue. Negative for appetite change, chills and fever.   HENT: Negative for congestion, hearing loss, rhinorrhea, sore throat, trouble swallowing and voice change.    Respiratory: Negative for cough, chest tightness, shortness of breath and wheezing.    Cardiovascular: Negative for chest pain, palpitations and leg swelling.   Gastrointestinal: Positive for abdominal pain and diarrhea. Negative for blood in stool, nausea and vomiting.   Genitourinary: Negative for difficulty urinating, frequency, hematuria and urgency.   Musculoskeletal: Negative for back pain, joint swelling and neck stiffness.   Skin: Negative for pallor and rash.   Neurological: Negative for tremors, seizures, syncope, speech difficulty, weakness, numbness and headaches.   Hematological: Negative for adenopathy.   Psychiatric/Behavioral: Negative for agitation, behavioral problems, confusion and " sleep disturbance.     Objective:     Vital Signs (Most Recent):  Temp: 98.7 °F (37.1 °C) (10/27/20 0408)  Pulse: (!) 112 (10/27/20 0408)  Resp: 18 (10/27/20 0408)  BP: (!) 110/70  SpO2: 97 % (10/27/20 0700) Vital Signs (24h Range):  Temp:  [98.2 °F (36.8 °C)-99.8 °F (37.7 °C)] 98.7 °F (37.1 °C)  Pulse:  [] 112  Resp:  [18] 18  SpO2:  [91 %-99 %] 97 %  BP: ()/(53-68) 84/61     Weight: 83.5 kg (184 lb 1.4 oz)  Body mass index is 25.67 kg/m².    Intake/Output Summary (Last 24 hours) at 10/27/2020 0748  Last data filed at 10/27/2020 0600  Gross per 24 hour   Intake 1160 ml   Output 3 ml   Net 1157 ml      Physical Exam  Vitals signs and nursing note reviewed.   Constitutional:       General: He is not in acute distress.     Appearance: He is ill-appearing. He is not diaphoretic.   HENT:      Head: Normocephalic and atraumatic.      Mouth/Throat:      Mouth: Mucous membranes are dry.   Eyes:      General: No scleral icterus.        Right eye: No discharge.         Left eye: No discharge.      Pupils: Pupils are equal, round, and reactive to light.   Neck:      Vascular: No JVD.   Cardiovascular:      Rate and Rhythm: Normal rate and regular rhythm.   Pulmonary:      Effort: Pulmonary effort is normal.      Breath sounds: Normal breath sounds.   Abdominal:      General: Bowel sounds are normal. There is no distension.      Palpations: Abdomen is soft.      Tenderness: There is abdominal tenderness in the left upper quadrant and left lower quadrant. There is no rebound.   Musculoskeletal:      Right lower leg: No edema.      Left lower leg: No edema.   Skin:     General: Skin is warm.      Capillary Refill: Capillary refill takes less than 2 seconds.      Findings: No rash.   Neurological:      General: No focal deficit present.      Mental Status: He is alert.      Cranial Nerves: No cranial nerve deficit.   Psychiatric:         Mood and Affect: Mood normal.         Significant Labs:   BMP:   Recent Labs    Lab 10/27/20  0442   GLU 85      K 3.4*   *   CO2 12*   BUN 35*   CREATININE 2.3*   CALCIUM 7.3*     CBC:   Recent Labs   Lab 10/26/20  0416 10/27/20  0442   WBC 5.43 5.52   HGB 10.2* 9.3*   HCT 32.2* 28.8*    160       Significant Imaging: I have reviewed all pertinent imaging results/findings within the past 24 hours.      Assessment/Plan:      * C. difficile colitis  Source is likely colitis from C.difficile.     Map appears to be above 70.  Will continue oral vancomycin and IV metronidazole  Patient condition improving at this point  As per infectious disease will benefit from LTAC placement and will need vancomycin and metronidazole for 2 weeks      Microbiology Results (last 7 days)     Procedure Component Value Units Date/Time    Urine Culture High Risk [[id number]] Collected: 10/20/20 0022    Order Status: Completed Specimen: Urine, Clean Catch Updated: 10/21/20 1928     Urine Culture, Routine No growth    Narrative:      Indicated criteria for high risk culture:->Other  Other (specify):->Neutropenic patient    Blood culture x two cultures. Draw prior to antibiotics. [[id number]] Collected: 10/15/20 1626    Order Status: Completed Specimen: Blood from Peripheral, Right Hand Updated: 10/21/20 0612     Blood Culture, Routine No growth after 5 days.    Narrative:      Aerobic and anaerobic    Blood culture x two cultures. Draw prior to antibiotics. [[id number]] Collected: 10/15/20 1546    Order Status: Completed Specimen: Blood from Antecubital, Right Arm Updated: 10/21/20 0612     Blood Culture, Routine No growth after 5 days.    Narrative:      Aerobic and anaerobic            Acute renal failure superimposed on stage 3 chronic kidney disease  Creatinine trending down  Possibly at ATN  improving at this point   BMP reviewed- noted Estimated Creatinine Clearance: 32.3 mL/min (A) (based on SCr of 2.3 mg/dL (H)). according to latest data.   Monitor UOP and serial BMP and adjust therapy as  needed. Renally dose meds.  Results for TANG LAUREN (MRN [id number]) as of 10/24/2020 16:35                          Iron deficiency  History noted  Will follow Hematology recommendation      Moderate malnutrition  Nutrition consulted. Body mass index is 25.67 kg/m².. Encourage maximal PO intake. Diet supplementation ordered per nutrition approval. Will encourage PO and monitor closely for weight changes.      Anemia in stage 3 chronic kidney disease  Patient's anemia is currently controlled. Has not received any PRBCs to date.. Etiology likely d/t  anemia of chronic disease  Current CBC reviewed-   Lab Results   Component Value Date    HGB 9.3 (L) 10/27/2020    HCT 28.8 (L) 10/27/2020     Monitor serial CBC and transfuse if patient becomes hemodynamically unstable, symptomatic or H/H drops below 7/21.         Anemia due to chemotherapy  Patient's anemia is currently controlled. Has not received any PRBCs to date.. Etiology likely d/t chemo no also has iron-deficiency anemia  Continue ferrous sulfate 325mg PO BID  Current CBC reviewed-   Lab Results   Component Value Date    HGB 9.3 (L) 10/27/2020    HCT 28.8 (L) 10/27/2020     Monitor serial CBC and transfuse if patient becomes hemodynamically unstable, symptomatic or H/H drops below 7/21.         Hypokalemia due to excessive gastrointestinal loss of potassium  Resolved Give supplement and monitor level.    Potassium   Date Value Ref Range Status   10/27/2020 3.4 (L) 3.5 - 5.1 mmol/L Final   10/26/2020 3.6 3.5 - 5.1 mmol/L Final         Febrile neutropenia  Improved  Monitor leukocyte count.  Hematology on the case  Granix given today.;    WBC   Date Value Ref Range Status   10/27/2020 5.52 3.90 - 12.70 K/uL Final       Post-transplant lymphoproliferative disorder (PTLD)  Has been receiving chemo for this.      -donor kidney transplant  Continue Prograf at usual dose.      Acquired hypothyroidism  Continue levothyroxine.    Metabolic  acidosis  Acidosis persistent  Results for TANG LAUREN (MRN [id number]) as of 10/24/2020 16:35   Ref. Range 10/24/2020 03:50   CO2 Latest Ref Range: 23 - 29 mmol/L 13 (L)   Anion Gap Latest Ref Range: 8 - 16 mmol/L 10      Was most likely secondary to diarrhea  Nephrology on the case  Will continue sodium bicarb      VTE Risk Mitigation (From admission, onward)         Ordered     IP VTE LOW RISK PATIENT  Once      10/15/20 2220                Discharge Planning   TRINH: 10/26/2020     Code Status: Full Code   Is the patient medically ready for discharge?: Yes    Reason for patient still in hospital (select all that apply): Treatment and Pending disposition  Discharge Plan A: Home with family                  Julia Summers MD  Department of Hospital Medicine   [facility]

## 2020-10-27 NOTE — PLAN OF CARE
Intervention: general healthful diet, nutrition education, parenteral nutrition therapy, and prescription medication- appetite stimulant      Recommendation:   1) Encourage PO intakes on regular diet with boost pudding @ meals     2) supplemental nutrition support needed: Pt refused NGT, continue PPN short term  D 5 AA 4.25 @ 85 ml/hr + standard lipids  ( 1192 kcal ( 62% EEN), 86 g protein ( 100% EPN))    3) If nutrition support needed longterm discuss PEG with pt  Isosource 1.5 @ 20 ml/hr advancing to goal of 50 ml/hr + 180 ml flush q 4 hr  ( provides 81 g protein ( 100% EEN), 1800 kcal ( 94% EEN), and 912 ml free water)     Goals: 1) PO intakes 50% of meals and supplements at f/u 2) PO intakes > 50% of meals of supplemental nutrition support continues  Nutrition Goal Status: not met/ new  Communication of RD Recs: reviewed with physician(POC, sticky note, second sign)

## 2020-10-27 NOTE — ASSESSMENT & PLAN NOTE
Improved  Monitor leukocyte count.  Hematology on the case  Granix given today.;    WBC   Date Value Ref Range Status   10/27/2020 5.52 3.90 - 12.70 K/uL Final

## 2020-10-27 NOTE — ASSESSMENT & PLAN NOTE
Patient's anemia is currently controlled. Has not received any PRBCs to date.. Etiology likely d/t  anemia of chronic disease  Current CBC reviewed-   Lab Results   Component Value Date    HGB 9.3 (L) 10/27/2020    HCT 28.8 (L) 10/27/2020     Monitor serial CBC and transfuse if patient becomes hemodynamically unstable, symptomatic or H/H drops below 7/21.

## 2020-10-27 NOTE — PLAN OF CARE
Pt is resting comfortably. His BP was 79/53 at the midnight vitals, WILEY Figueroa NP ordered at 500mL bolus to run at 250mL/hr for 2hrs,  pt is tolerating well. Pt has had no c/o pain or any obvious s/s of distress. States he still does not want to eat,  just does not have an appetite. Will continue to monitor and encourage to eat.

## 2020-10-27 NOTE — ASSESSMENT & PLAN NOTE
Patient's anemia is currently controlled. Has not received any PRBCs to date.. Etiology likely d/t chemo no also has iron-deficiency anemia  Continue ferrous sulfate 325mg PO BID  Current CBC reviewed-   Lab Results   Component Value Date    HGB 9.3 (L) 10/27/2020    HCT 28.8 (L) 10/27/2020     Monitor serial CBC and transfuse if patient becomes hemodynamically unstable, symptomatic or H/H drops below 7/21.

## 2020-10-27 NOTE — SUBJECTIVE & OBJECTIVE
Interval History:  Continues to have diarrhea.  Patient was hypotensive this morning.  Was given 1 L normal saline bolus.  Patient's blood pressure responded to volume.  No other symptoms at this point    Review of Systems   Constitutional: Positive for fatigue. Negative for appetite change, chills and fever.   HENT: Negative for congestion, hearing loss, rhinorrhea, sore throat, trouble swallowing and voice change.    Respiratory: Negative for cough, chest tightness, shortness of breath and wheezing.    Cardiovascular: Negative for chest pain, palpitations and leg swelling.   Gastrointestinal: Positive for abdominal pain and diarrhea. Negative for blood in stool, nausea and vomiting.   Genitourinary: Negative for difficulty urinating, frequency, hematuria and urgency.   Musculoskeletal: Negative for back pain, joint swelling and neck stiffness.   Skin: Negative for pallor and rash.   Neurological: Negative for tremors, seizures, syncope, speech difficulty, weakness, numbness and headaches.   Hematological: Negative for adenopathy.   Psychiatric/Behavioral: Negative for agitation, behavioral problems, confusion and sleep disturbance.     Objective:     Vital Signs (Most Recent):  Temp: 96.7 °F (35.9 °C) (10/27/20 1549)  Pulse: 73 (10/27/20 1549)  Resp: 12 (10/27/20 1549)  BP: 110/74 (10/27/20 1549)  SpO2: (!) 90 % (10/27/20 1549) Vital Signs (24h Range):  Temp:  [96.7 °F (35.9 °C)-99.1 °F (37.3 °C)] 96.7 °F (35.9 °C)  Pulse:  [] 73  Resp:  [12-18] 12  SpO2:  [90 %-97 %] 90 %  BP: ()/(53-74) 110/74     Weight: 83.5 kg (184 lb 1.4 oz)  Body mass index is 25.67 kg/m².    Intake/Output Summary (Last 24 hours) at 10/27/2020 1704  Last data filed at 10/27/2020 0600  Gross per 24 hour   Intake 1060 ml   Output 3 ml   Net 1057 ml      Physical Exam  Vitals signs and nursing note reviewed.   Constitutional:       General: He is not in acute distress.     Appearance: He is ill-appearing. He is not diaphoretic.    HENT:      Head: Normocephalic and atraumatic.      Mouth/Throat:      Mouth: Mucous membranes are dry.   Eyes:      General: No scleral icterus.        Right eye: No discharge.         Left eye: No discharge.      Pupils: Pupils are equal, round, and reactive to light.   Neck:      Vascular: No JVD.   Cardiovascular:      Rate and Rhythm: Normal rate and regular rhythm.   Pulmonary:      Effort: Pulmonary effort is normal.      Breath sounds: Normal breath sounds.   Abdominal:      General: Bowel sounds are normal. There is no distension.      Palpations: Abdomen is soft.      Tenderness: There is abdominal tenderness in the left upper quadrant and left lower quadrant. There is no rebound.   Musculoskeletal:      Right lower leg: No edema.      Left lower leg: No edema.   Skin:     General: Skin is warm.      Capillary Refill: Capillary refill takes less than 2 seconds.      Findings: No rash.   Neurological:      General: No focal deficit present.      Mental Status: He is alert.      Cranial Nerves: No cranial nerve deficit.   Psychiatric:         Mood and Affect: Mood normal.         Significant Labs:   BMP:   Recent Labs   Lab 10/27/20  0442   GLU 85      K 3.4*   *   CO2 12*   BUN 35*   CREATININE 2.3*   CALCIUM 7.3*     CBC:   Recent Labs   Lab 10/26/20  0416 10/27/20  0442   WBC 5.43 5.52   HGB 10.2* 9.3*   HCT 32.2* 28.8*    160       Significant Imaging: I have reviewed all pertinent imaging results/findings within the past 24 hours.

## 2020-10-27 NOTE — PT/OT/SLP PROGRESS
"Physical Therapy Treatment    Patient Name:  Alin Burkett   MRN:  [id number]    Recommendations:     Discharge Recommendations:  nursing facility, skilled   Discharge Equipment Recommendations: walker, rolling   Barriers to discharge: cdiff    Assessment:     Alin Burkett is a 70 y.o. male admitted with a medical diagnosis of C. difficile colitis.  He presents with the following impairments/functional limitations:  weakness, impaired endurance, decreased lower extremity function, impaired functional mobilty     Rehab Prognosis: Fair; patient would benefit from acute skilled PT services to address these deficits and reach maximum level of function.    Recent Surgery: * No surgery found *      Plan:     During this hospitalization, patient to be seen 6 x/week to address the identified rehab impairments via gait training, therapeutic activities, therapeutic exercises and progress toward the following goals:    · Plan of Care Expires:  11/30/20    Subjective     Chief Complaint: pt reports "not getting oob due to having rectal tube inserted, I go to the bathroom every time I move"  Patient/Family Comments/goals:   Pain/Comfort:nop c/o pain,  ·        Objective:     Communicated with Tati prior to session.  Patient found supine with  IV line and rectal tube upon PT entry to room.     General Precautions: Standard, contact   Orthopedic Precautions:N/A   Braces:       Functional Mobility:  · Pt declined any mobility or ther ex in bed      AM-PAC 6 CLICK MOBILITY          Therapeutic Activities and Exercises:  Pt declined therapies or any mobility, no there ex in bed for strengthening due to BM's.    Patient left supine with all lines intact and call button in reach..    GOALS:   Multidisciplinary Problems     Physical Therapy Goals        Problem: Physical Therapy Goal    Goal Priority Disciplines Outcome Goal Variances Interventions   Physical Therapy Goal     PT, PT/OT Ongoing, Progressing     Description: Goals to be " met by: 2020     Patient will increase functional independence with mobility by performin. Supine to sit with MInimal Assistance  2. Sit to stand transfer with Minimal Assistance  3. Bed to chair transfer with Minimal Assistance using Rolling Walker  4. Gait  x 250 feet with Minimal Assistance using Rolling Walker.   5. Lower extremity exercise program x20 reps                      Time Tracking:     PT Received On: 10/26/20  PT Start Time: 222     PT Stop Time: 245  PT Total Time (min): 23 min     Billable Minutes: Total Time none    Treatment Type: Treatment  PT/PTA: PTA     PTA Visit Number: 1     Lola Pryor PTA  10/27/2020

## 2020-10-27 NOTE — ASSESSMENT & PLAN NOTE
Acidosis persistent  Results for XIN TANG SANCHEZ (MRN [id number]) as of 10/24/2020 16:35   Ref. Range 10/24/2020 03:50   CO2 Latest Ref Range: 23 - 29 mmol/L 13 (L)   Anion Gap Latest Ref Range: 8 - 16 mmol/L 10      Was most likely secondary to diarrhea  Nephrology on the case  Will continue sodium bicarb

## 2020-10-27 NOTE — ASSESSMENT & PLAN NOTE
Creatinine stable  Possibly at ATN    BMP reviewed- noted Estimated Creatinine Clearance: 32.3 mL/min (A) (based on SCr of 2.3 mg/dL (H)). according to latest data.   Monitor UOP and serial BMP and adjust therapy as needed. Renally dose meds.  Results for TANG LAUREN (MRN [id number]) as of 10/24/2020 16:35

## 2020-10-28 NOTE — NURSING
Monitor room called, HR had briefly dropped to 38. Hr is currently 90. Dr. Summers notified, will continue to monitor.

## 2020-10-28 NOTE — PLAN OF CARE
POC reviewed with patient. Patient verbalizes understanding. Safety and tele maintained throughout shift. No c/o pain noted. Flexiseal in place. TPN. Call light within reach. Will continue to monitor.

## 2020-10-28 NOTE — PLAN OF CARE
CM received a note from [facility] stating that the pt is denied for LTAC services due to not being medically necessary. Davida Deluca LCSW     10/28/20 [id number]   Post-Acute Status   Post-Acute Authorization Placement   Post-Acute Placement Status Insurance Denial

## 2020-10-28 NOTE — PLAN OF CARE
Problem: Occupational Therapy Goal  Goal: Occupational Therapy Goal  Description: Goals to be met by: 11/6/2020    Patient will increase functional independence with ADLs by performing:    UE Dressing with Set-up Assistance.  LE Dressing with Minimal Assistance.  Grooming while seated with Set-up Assistance.  Toileting from toilet with Minimal Assistance for hygiene and clothing management. -bowel mgmnt system in place.  Sitting at edge of bed x 15 minutes with Supervision.  Toilet transfer to toilet with Minimal Assistance.    Outcome: Ongoing, Progressing; Patient is making progress. Goals remain appropriate. Continue OT plan of care.

## 2020-10-28 NOTE — PLAN OF CARE
Pt making good gains with getting oob, exer, sit<>stands and SPT with fww.  Pt to benefit from continued skilled PT services to improve overall fxn mobs and possible return to home with wife.  Lola Pryor, PTA

## 2020-10-28 NOTE — PROGRESS NOTES
[facility]  Patient Name: Alin Burkett  : 1950  Age: 70 y.o.  Sex: male  MRN: [id number]  Admission Date: 10/15/2020  Hospital Length of Stay: 13 days  Code Status: Full Code   Admitting Provider: Brennan Decker MD  Attending Provider: Julia Summers MD  Primary Care Physician: Carmen Krueger MD  Full Code  Subjective:     Date of Visit: 10/28/2020             Principal Problem: C. difficile colitis    Patient ID: Alin Burkett is a 70 y.o. 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 ED workup showed pt was neutropenic with WBC 0.2 with fever. Pt was cultured and given fluid resuscitation and vancomycin and cefepime. Stool cultures positive for C. Difficile toxin. CT abd/pelvis without contrast revealed severe proctocolitis without megacolon. Pt remains pancytopenic with most recent WBC 0.11 with ANC 0.0 and H/H 9.6/29.7 and platelets 91. Pt seen at bedside with his wife and states he has chronic throbbing abdominal pain made worse by eating and has had chronic diarrhea. He states he is fatigued and has decreased appetite along with fever/chills and night sweats.    10/19/2020: Pt seen at bedside and appears cachectic. He endorses that he is still having diarrhea, but that it has improved and decreased in frequency and consistency has thickened. Pt states abdominal pain has improved, but it still present. He endorses fatigue. Pt denies hemoptysis, hematochezia, melena, hematuria.    10/20/2020: Pt seen at bedside in ICU. He states that he is feeling less lethargic. He states his diarrhea is improving and that he only has abdominal pain to palpation.    10/21/2020: Pt seen at bedside in ICU NAD. He states his abdominal pain has decreased. He is still having  diarrhea but with less frequency.     10/22/2020: Pt seen at bedside in ICU. Pt states his abdominal pain has decreased and it no longer hurt to palpate his abdomen. Pt states diarrhea is improving.     10/23/2020: Pt seen at bedside in ICU. Pt states he no longer has abdominal pain and that his fatigue is improving and that his stool is thickening in consistency.    10/26/2020: Pt seen at bedside. He states abdominal pain has been intermittent and that he has had worsening of diarrhea over the last 3 days. No other complaints at this time.     10/28/2020: Pt seen at bedside NAD. He states his abdominal pain has decreased. No new complaints at this time.     Review of Systems   Constitutional: Positive for activity change and fatigue. Negative for chills and fever.   HENT: Negative for mouth sores and trouble swallowing.    Eyes: Negative for photophobia and visual disturbance.   Respiratory: Negative for cough, chest tightness, shortness of breath, wheezing and stridor.    Cardiovascular: Negative for chest pain and leg swelling.   Gastrointestinal: Positive for abdominal distention and abdominal pain. Negative for constipation, nausea and vomiting.   Musculoskeletal: Negative for arthralgias, back pain and myalgias.   Skin: Negative for color change, pallor, rash and wound.   Neurological: Negative for syncope, speech difficulty and weakness.   Hematological: Negative for adenopathy. Does not bruise/bleed easily.   Psychiatric/Behavioral: Negative for agitation, behavioral problems, confusion, decreased concentration and dysphoric mood.        ONCOLOGY HISTORY:     1. Monomorphic post-transplant lymphoproliferative disorder              A. 2/2020: Noticed left inguinal lymphadenopathy after a dog bite (failed to resolve with antibiotics)              B. 6/2/2020: Saw Dr. Collins in infectious diseases for inguinal lymphadenopathy - referred for biopsy              C. 6/30/2020: Core biopsy of L inguinal lymph node  shows B-cell lymphoma of germinal center origin; EBV-positive by LONNIE; morphology nondiagnostic of PTLD vs follicular lymphoma              D. 2020: PET/CT shows left internal iliac chain node measuring 3.3 x 3 cm with SUV max 37; L inguinal node measures 3.2 x 2.4 cm with SUV max 36              E. 2020: Excisional biopsy of left inguinal node shows monomorphic post-transplant lymphoproliferative disorder (DLBCL, GCB 60%, follicular lymphoma, grade 3B 40%); FISH for MYC rearrangement is negative              F. 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  Renal allograft biopsy 17 (DIVINE): 21 glomeruli, none globally sclerosed, <5% interstitial fibrosis, no ACR, c4d negative, AVR CCT Type 2 (V1 lesion); plan THYMO     Dissecting aortic aneurysm (any part), abdominal     Diverticulosis     Encounter for blood transfusion     ESRD (end stage renal disease) 2010    H/O urethral stricture 2018    H/O: urethral stricture     History of AAA (abdominal aortic aneurysm) repair     History of urethral stricture 2018    Hypertension     Hypokalemia     Hypothyroidism 1/10/2014    Inguinal hernia bilateral, non-recurrent     Kidney stones     Organ transplant candidate 2013    Plantar warts 1/10/2014    Recurrent UTI 2017    S/P kidney  transplant     Secondary hyperparathyroidism, renal     Sepsis 10/24/2020    Thyroid disease        Family History   Problem Relation Age of Onset    Diabetes Mother     Alzheimer's disease Mother     Alcohol abuse Father     HIV Brother     Stroke Maternal Aunt     Kidney disease Paternal Uncle     Kidney disease Cousin     No Known Problems Sister     No Known Problems Daughter     No Known Problems Sister     No Known Problems Brother     No Known Problems Brother     Cancer Brother         thyroid cancer    Colon cancer Brother     Melanoma Neg Hx     Psoriasis Neg Hx     Lupus Neg Hx     Eczema Neg Hx     Colon polyps Neg Hx     Crohn's disease Neg Hx     Ulcerative colitis Neg Hx     Celiac disease Neg Hx        Past Surgical History:   Procedure Laterality Date    ABDOMINAL SURGERY      exploratory lapatomy x 2    ABLATION N/A 8/22/2019    Procedure: ABLATION, SVT;  Surgeon: Emerson Mcmanus MD;  Location: [facility] EP LAB;  Service: Cardiology;  Laterality: N/A;  SVT, RFA, CARTO, anes, GP, 323    AORTA - SUPERIOR MESENTERIC ARTERY BYPASS GRAFT      BLADDER NECK RECONSTRUCTION      BLADDER SURGERY      COLONOSCOPY  10/10/2013    Dr. Gutierrez, repeat in 5 years    CYSTOSCOPY      CYSTOSCOPY N/A 12/19/2018    Procedure: CYSTOSCOPY;  Surgeon: Dewey Mann MD;  Location: [facility] OR [address] [place] [address];  Service: Urology;  Laterality: N/A;  45 min    CYSTOURETHROSCOPY WITH DIRECT VISION INTERNAL URETHROTOMY N/A 12/19/2018    Procedure: CYSTOSCOPY, WITH DIRECT VISION INTERNAL URETHROTOMY;  Surgeon: Dewey Mann MD;  [facility];  Service: Urology;  Laterality: N/A;    DILATION OF URETHRA N/A 12/19/2018    Procedure: DILATION, URETHRA;  Surgeon: Dewey Mann MD;  [facility];  Service: Urology;  Laterality: N/A;    EXCISIONAL BIOPSY N/A 7/13/2020    Procedure: EXCISIONAL BIOPSY- LEFT INGUINAL NODE;  Surgeon: Vlad Epstein MD;  Location: [facility] OR 2ND FLR;   Service: General;  Laterality: N/A;    FLEXIBLE CYSTOSCOPY N/A 11/6/2019    Procedure: CYSTOSCOPY, FLEXIBLE;  Surgeon: Dewey Mann MD;  Location: [address];  Service: Urology;  Laterality: N/A;    GASTROJEJUNOSTOMY      HEMORRHOID SURGERY      HERNIA REPAIR      INSERTION OF VENOUS ACCESS PORT Left 7/27/2020    Procedure: INSERTION, VENOUS ACCESS PORT;  Surgeon: Vlad Epstein MD;  Location: [address];  Service: General;  Laterality: Left;    KIDNEY TRANSPLANT      LEFT HEART CATHETERIZATION Left 8/20/2019    Procedure: Left heart cath;  Surgeon: Javan Oscar MD;  Location[facility] CATH/EP LAB;  Service: Cardiology;  Laterality: Left;    LITHOTRIPSY      LYMPH NODE BIOPSY N/A 6/30/2020    Procedure: BIOPSY, LYMPH NODE;  Surgeon: Ella Diagnostic Provider;  Location: [address];  Service: General;  Laterality: N/A;  189 lymph node biopsy /ULTRASOUND    PERCUTANEOUS NEPHROLITHOTRIPSY      right  ESWL  10/31/12    right ESWL  6/26/12    URETHROPLASTY USING PATCH GRAFT N/A 11/6/2019    Procedure: URETHROPLASTY, USING PATCH GRAFT BUCCAL MUCOSA GRAFT;  Surgeon: Dewey Mann MD;  Location: [address];  Service: Urology;  Laterality: N/A;  3 HOURS       Social History     Socioeconomic History    Marital status: Single     Spouse name: Not on file    Number of children: Not on file    Years of education: Not on file    Highest education level: Not on file   Occupational History     Employer: Disabled   Social Needs    Financial resource strain: Not on file    Food insecurity     Worry: Not on file     Inability: Not on file    Transportation needs     Medical: Not on file     Non-medical: Not on file   Tobacco Use    Smoking status: Former Smoker     Packs/day: 0.50     Years: 40.00     Pack years: 20.00     Types: Cigarettes     Quit date: 6/16/2010     Years since quitting: 10.3    Smokeless tobacco: Never Used   Substance and Sexual Activity    Alcohol use: Yes      Alcohol/week: 3.0 standard drinks     Types: 3 Cans of beer per week     Comment: occasional/social    Drug use: No     Comment: THC in youth    Sexual activity: Yes     Partners: Female     Birth control/protection: None   Lifestyle    Physical activity     Days per week: Not on file     Minutes per session: Not on file    Stress: Not on file   Relationships    Social connections     Talks on phone: Not on file     Gets together: Not on file     Attends Orthodoxy service: Not on file     Active member of club or organization: Not on file     Attends meetings of clubs or organizations: Not on file     Relationship status: Not on file   Other Topics Concern    Not on file   Social History Narrative    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       Current Facility-Administered Medications   Medication Dose Route Frequency Provider Last Rate Last Dose    acetaminophen tablet 650 mg  650 mg Oral Q4H PRN Julia Summers MD   650 mg at 10/25/20 1812    albuterol-ipratropium 2.5 mg-0.5 mg/3 mL nebulizer solution 3 mL  3 mL Nebulization Q6H PRN Julia Summers MD   3 mL at 10/20/20 1602    allopurinoL tablet 100 mg  100 mg Oral Daily Julia Summers MD   100 mg at 10/28/20 0848    Amino acid 4.25% - dextrose 5% (CLINIMIX-E) solution with additives (1L provides 42.5 gm AA, 50 gm CHO (170 kcal/L dextrose), Na 35, K 30, Mg 5, Ca 4.5, Acetate 70, Cl 39, Phos 15)   Intravenous Continuous Julia Summers MD 85 mL/hr at 10/28/20 0500      Amino acid 4.25% - dextrose 5% (CLINIMIX-E) solution with additives (1L provides 42.5 gm AA, 50 gm CHO (170 kcal/L dextrose), Na 35, K 30, Mg 5, Ca 4.5, Acetate 70, Cl 39, Phos 15)   Intravenous Continuous Julia Summers MD        aspirin EC tablet 81 mg  81 mg Oral Daily Julia Summers MD   81 mg at 10/28/20 0848    brimonidine-timoloL 0.2-0.5 % ophthalmic solution 1 drop  1 drop Both Eyes Q12H Julia Summers MD   1 drop at 10/27/20 2220     calcitRIOL capsule 0.5 mcg  0.5 mcg Oral Daily uJlia Summers MD   0.5 mcg at 10/28/20 0848    cholestyramine-aspartame 4 gram packet 4 g  1 packet Oral TID Lola Tolbert MD   4 g at 10/28/20 0848    dicyclomine capsule 10 mg  10 mg Oral QID Julia Summers MD   10 mg at 10/28/20 0848    famotidine tablet 20 mg  20 mg Oral QHS Julia Summers MD   20 mg at 10/27/20 2052    fat emulsion 20% infusion 250 mL  250 mL Intravenous Daily Julia Summers MD 20.8 mL/hr at 10/27/20 2210 250 mL at 10/27/20 2210    fat emulsion 20% infusion 250 mL  250 mL Intravenous Daily Julia Summers MD        ferrous sulfate EC tablet 325 mg  325 mg Oral BID Julia Summers MD   325 mg at 10/28/20 0848    hyoscyamine ODT 0.125 mg  0.125 mg Sublingual Q4H PRN Julia Summers MD   0.125 mg at 10/17/20 1604    levothyroxine tablet 100 mcg  100 mcg Oral Before breakfast Julia Summers MD   100 mcg at 10/28/20 0559    magnesium sulfate 2g in water 50mL IVPB (premix)  2 g Intravenous PRN Julia Summers MD        magnesium sulfate 2g in water 50mL IVPB (premix)  4 g Intravenous PRN Julia Summers MD        metoclopramide HCl injection 10 mg  10 mg Intravenous Q6H PRN Julia Summers MD        metronidazole IVPB 500 mg  500 mg Intravenous Q8H Julia Summers  mL/hr at 10/28/20 0340 500 mg at 10/28/20 0340    midodrine tablet 10 mg  10 mg Oral TID Julia Summers MD   10 mg at 10/28/20 0848    morphine injection 2 mg  2 mg Intravenous Q4H PRN Julia Summers MD   2 mg at 10/20/20 2213    ondansetron disintegrating tablet 8 mg  8 mg Oral Q6H PRN Julia Summers MD        potassium chloride 10 mEq in 100 mL IVPB  40 mEq Intravenous PRN Julia Summers  mL/hr at 10/20/20 0729 40 mEq at 10/20/20 0729    And    potassium chloride 10 mEq in 100 mL IVPB  60 mEq Intravenous PRN Julia Summers MD        And    potassium chloride 10 mEq in 100 mL  IVPB  80 mEq Intravenous PRN Julia Summers MD        potassium chloride SA CR tablet 20 mEq  20 mEq Oral TID Regan Caldwell MD   20 mEq at 10/28/20 0848    promethazine tablet 25 mg  25 mg Oral Q6H PRN Julia Summers MD        sodium bicarbonate tablet 650 mg  650 mg Oral TID Regan Caldwell MD   650 mg at 10/28/20 0848    sodium chloride 0.9% flush 10 mL  10 mL Intravenous PRN Julia Summers MD        tacrolimus capsule 2 mg  2 mg Oral Daily PM Julia Summers MD   2 mg at 10/27/20 1807    tacrolimus capsule 3 mg  3 mg Oral Daily AM Julia Summers MD   3 mg at 10/28/20 0848    travoprost 0.004 % ophthalmic solution 1 drop  1 drop Both Eyes QHS Julia Summers MD   1 drop at 10/27/20 2056    vancomycin 25 mg/mL oral solution 125 mg  125 mg Oral Q6H Julia Summers MD   125 mg at 10/28/20 0559        allopurinoL  100 mg Oral Daily    aspirin  81 mg Oral Daily    brimonidine-timoloL  1 drop Both Eyes Q12H    calcitRIOL  0.5 mcg Oral Daily    cholestyramine-aspartame  1 packet Oral TID    dicyclomine  10 mg Oral QID    famotidine  20 mg Oral QHS    fat emulsion 20%  250 mL Intravenous Daily    fat emulsion 20%  250 mL Intravenous Daily    ferrous sulfate  325 mg Oral BID    levothyroxine  100 mcg Oral Before breakfast    metronidazole  500 mg Intravenous Q8H    midodrine  10 mg Oral TID    potassium chloride  20 mEq Oral TID    sodium bicarbonate  650 mg Oral TID    tacrolimus  2 mg Oral Daily PM    tacrolimus  3 mg Oral Daily AM    travoprost  1 drop Both Eyes QHS    vancomycin  125 mg Oral Q6H        Amino acid 4.25% - dextrose 5% (CLINIMIX-E) solution with additives (1L provides 42.5 gm AA, 50 gm CHO (170 kcal/L dextrose), Na 35, K 30, Mg 5, Ca 4.5, Acetate 70, Cl 39, Phos 15) 85 mL/hr at 10/28/20 0500    Amino acid 4.25% - dextrose 5% (CLINIMIX-E) solution with additives (1L provides 42.5 gm AA, 50 gm CHO (170 kcal/L dextrose), Na 35, K 30, Mg  5, Ca 4.5, Acetate 70, Cl 39, Phos 15)         acetaminophen, albuterol-ipratropium, hyoscyamine, magnesium sulfate IVPB, magnesium sulfate IVPB, metoclopramide HCl, morphine, ondansetron, potassium chloride in water **AND** potassium chloride in water **AND** potassium chloride in water, promethazine, sodium chloride 0.9%    Antibiotics (From admission, onward)    Start     Stop Route Frequency Ordered    10/23/20 1800  vancomycin 25 mg/mL oral solution 125 mg  (C. difficile Infection (CDI) Treatment Order Panel)      11/06 1759 Oral Every 6 hours 10/23/20 1623    10/16/20 1900  metronidazole IVPB 500 mg      -- IV Every 8 hours (non-standard times) 10/16/20 1755          Review of patient's allergies indicates:  No Known Allergies  All medications and past history have been reviewed.    Objective:      Vitals:  Patient Vitals for the past 24 hrs:   BP Temp Temp src Pulse Resp SpO2   10/28/20 0822 -- -- -- -- -- 98 %   10/28/20 0729 102/61 96.4 °F (35.8 °C) -- 83 18 98 %   10/28/20 0413 (!) 160/61 99.3 °F (37.4 °C) -- 84 18 97 %   10/28/20 0011 93/61 97.1 °F (36.2 °C) Oral (!) 111 18 97 %   10/27/20 2200 -- -- -- -- -- 96 %   10/27/20 2011 101/65 98.8 °F (37.1 °C) -- 93 18 (!) 93 %   10/27/20 1549 110/74 96.7 °F (35.9 °C) -- 73 12 (!) 90 %   10/27/20 1100 (!) 85/55 97.7 °F (36.5 °C) Oral 92 16 97 %   10/27/20 0955 (!) 85/53 97.4 °F (36.3 °C) Oral 93 18 96 %      Body mass index is 25.67 kg/m².  Body surface area is 2.05 meters squared.    Last 24 Hours:    Intake/Output Summary (Last 24 hours) at 10/28/2020 0853  Last data filed at 10/28/2020 0600  Gross per 24 hour   Intake 2575.96 ml   Output 1150 ml   Net 1425.96 ml     Weight Readings:  Wt Readings from Last 5 Encounters:   10/24/20 83.5 kg (184 lb 1.4 oz)   10/10/20 68.5 kg (151 lb)   10/09/20 66.4 kg (146 lb 6.2 oz)   10/02/20 65.8 kg (145 lb)   09/11/20 68.6 kg (151 lb 2 oz)      Blood Type:  B POS     Physical Exam  Constitutional:       Appearance: He is  ill-appearing.   HENT:      Head: Normocephalic and atraumatic.      Right Ear: External ear normal.      Left Ear: External ear normal.      Nose: Nose normal. No congestion or rhinorrhea.   Eyes:      General:         Right eye: No discharge.         Left eye: No discharge.      Extraocular Movements: Extraocular movements intact.      Conjunctiva/sclera: Conjunctivae normal.      Pupils: Pupils are equal, round, and reactive to light.   Neck:      Musculoskeletal: Normal range of motion and neck supple. No neck rigidity.   Cardiovascular:      Rate and Rhythm: Normal rate and regular rhythm.      Heart sounds: Normal heart sounds. No murmur.   Pulmonary:      Effort: Pulmonary effort is normal. No respiratory distress.      Breath sounds: Normal breath sounds. No stridor. No wheezing, rhonchi or rales.   Abdominal:      General: Bowel sounds are normal. There is distension.      Palpations: Abdomen is soft.      Tenderness: There is abdominal tenderness (to palpation in the LLQ). There is no guarding.   Musculoskeletal: Normal range of motion.         General: No deformity.      Right lower leg: No edema.      Left lower leg: No edema.   Lymphadenopathy:      Cervical: No cervical adenopathy.   Skin:     General: Skin is warm and dry.      Coloration: Skin is not pale.      Findings: Bruising (bialteral UE and chest) present. No erythema or rash.   Neurological:      General: No focal deficit present.      Mental Status: He is alert and oriented to person, place, and time. Mental status is at baseline.      Cranial Nerves: No cranial nerve deficit.   Psychiatric:         Mood and Affect: Mood normal.         Behavior: Behavior normal.         Thought Content: Thought content normal.         Judgment: Judgment normal.         Labs:  Recent Labs   Lab 10/26/20  0416 10/27/20  0442 10/28/20  0433   WBC 5.43 5.52 5.16   RBC 3.33* 2.91* 3.04*   HGB 10.2* 9.3* 9.9*   HCT 32.2* 28.8* 30.2*    160 195   MCV 97 99*  99*     Recent Labs   Lab 10/26/20  0416 10/27/20  0442 10/28/20  0433    138 137   K 3.6 3.4* 3.2*   * 117* 115*   CO2 12* 12* 12*   BUN 36* 35* 36*   CREATININE 2.2* 2.3* 2.2*   GLU 83 85 131*   CALCIUM 7.8* 7.3* 7.6*       Imaging:  Results for orders placed or performed during the hospital encounter of 10/15/20 (from the past 2160 hour(s))   CT Abdomen Pelvis  Without Contrast    Impression    Findings consistent with severe proctocolitis.    Transplanted kidney right side of the pelvis with mild pelvocaliectasis and perinephric stranding nonspecific.  No calcified stones seen    Additional findings as detailed above including cystic lesion with in the native right kidney versus dilatation of collecting structure of the native right kidney.  Stones in the native right kidney.  Cystic lesion within the pancreas.    Final read    Virtual Radiology concordant      Electronically signed by: Ada Castillo MD  Date:    10/16/2020  Time:    08:56   Results for orders placed or performed during the hospital encounter of 08/06/20 (from the past 2160 hour(s))   CT Chest Abdomen Pelvis Without Contrast (XPD)    Impression    No acute abdominal pathology identified.    Nonvisualization of the left kidney with malrotation of the right kidney and multiple nonobstructing renal stones.  No hydronephrosis.    Right pelvic renal allograft with no evidence for renal allograft focal lesion, nephrolithiasis, or hydronephrosis.    Stable mild ectasia of the infrarenal abdominal aorta measuring up to 2.6 cm without evidence for aortic aneurysm or other acute aortic pathology.  Atherosclerosis identified.    Multiple enlarged lymph nodes in the left inguinal region, with interval dissection of multiple left inguinal and pelvic lymph nodes when compared to nuclear medicine PET-CT 07/08/2020.  Correlate with biopsy results.    Stable cystic appearing lesion in the pancreatic head measuring 1.2 cm.    Other findings as  above.    Electronically signed by resident: Mati Cárdenas  Date:    08/06/2020  Time:    09:23    Electronically signed by: Jayson Staples MD  Date:    08/06/2020  Time:    10:34     *Note: Due to a large number of results and/or encounters for the requested time period, some results have not been displayed. A complete set of results can be found in Results Review.     PET CT 07/08/2020  FINDINGS:  Quality of the study: Adequate.     In the neck, there is no abnormal hypermetabolic activity worrisome for malignancy.  Vascular stent identified in the left axillary region.  No significant lymphadenopathy.     In the chest, there is no abnormal hypermetabolic activity worsened malignancy.  Coronary atherosclerosis.  0.4 cm pulmonary nodule identified in the left upper lobe (axial series 3, image 67), lesion is too small to characterize with PET-CT. Recommend attenuation expected follow-up examinations. No significant lymphadenopathy.     In the abdomen and pelvis, there is hypermetabolic lymphadenopathy throughout the left internal/external iliac chain and left groin.  New left internal iliac chain node measures 3.3 x 3 0 cm with SUV max of 37 (axial fused image 193).  Left inguinal node measures approximately 3.2 x 2.4 cm with SUV max of 36 (axial fused image 218), previously measured up to 1.4 cm.  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  The right kidney appears atrophic with abnormal contour parenchymal calcifications, unchanged.  Right lower quadrant transplant kidney in place.  Stable small bladder diverticulum.  Stable 1.2 cm pancreatic cyst, better characterized on most recent CT with IV contrast.  Stable bilateral probable adrenal adenomas.  Colonic diverticulosis without evidence of acute diverticulitis.  Stable fusiform abdominal aortic ectasia.     Spleen appears upper limit of normal for size measuring 12.0 cm in craniocaudal dimension.  Normal heterogeneous uptake similar to liver.     In the bones, there is  no abnormal activity worrisome for malignancy.  Additional focus of increased uptake identified within the myofascial structures of the thigh, just deep to the left femur with SUV max of 27 (axial fused image 269).     Impression:     Hypermetabolic lymphadenopathy throughout the left iliac chain and left groin with additional hypermetabolic focus in the left thigh.  No hypermetabolic juan david disease above the diaphragm.  The Deauville score is 5.    All lab results and imaging results have been reviewed.    Assessment and Plan:      Present on Admission:   Febrile neutropenia   C. difficile colitis   Acquired hypothyroidism   Post-transplant lymphoproliferative disorder (PTLD)   Acute renal failure superimposed on stage 3 chronic kidney disease   Hypokalemia due to excessive gastrointestinal loss of potassium   Anemia due to chemotherapy   Anemia in stage 3 chronic kidney disease   Moderate malnutrition   Metabolic acidosis   (Resolved) Sepsis       Post-transplant lymphoproliferative disorder (PTLD)  -Cycle 4 RCHOP completed on 10/09/2020.  -Please have patient follow up with Primary Oncologist Dr. Luis Fernando Lopez within 72 hours of discharge.      C-difficile colitis  -Follow ID recommendations.     DIVINE  -Followed by Nephrology.      Normocytic Anemia with Thrombocytopenia  -Anemia stable. Platelets have recovered. Neutropenia resolved s/p granix 300mcg daily x 3 doses with last dose given 10/18/2020.  -Continue ferrous sulfate EC 325mg BID for iron deficiency. Last iron and tibc shows improvement of serum iron to 24 and saturated iron to 15.       Sincerely,  Alexi Camp PA-C    Note is available for collaborating MD; Dr. Cathy Stafford for review.    Electronically signed by: Alexi Camp PA-C

## 2020-10-28 NOTE — PROGRESS NOTES
" INPATIENT NEPHROLOGY PROGRESS  [facility] NEPHROLOGY    Alin Burkett  10/28/2020    Reason for consultation:  Acute kidney injury     History of Present Illness:  Patient has been having diarrhea with "jelly-like" consistency as well as crampiness in the abdomen.  Symptoms began roughly one week ago.  Also has had fever and malaise.  Appetite poor.  Fatigue as well.  He has been receiving chemo for PTLD; most recent cycle of CHOP was end of last week.  He has history of renal transplant four years ago and is currently on twice-a-day  Prograf.  He's had no cough, no unusual headache, no sinus pain, and no burning on urination.     10/20  Has some diarrhea and abdominal pain.  No nausea, chest pain, sob, new neuro symptoms, new joint pain.  He is very weak.    I told him that he had previously been seen by doctor Shonda and I would call him to see him.  He stated he didn't want to see Dr Merchant and requested that I become his nephrologist.    10/21  Not much change in renal function since yesterday. UOP 1.3L.  Sleeping quietly.  10/23  Sleeping.  1550 urine output.    10/24 VSS, no new complains. Appreciate ID and Heme input.  10/25 VSS, no new complains. sCr is better.  10/26 VSS, no new complains. Labs are acceptable. Lethargic at times.  10/27 VSS, no new complains.  10/28 VSS, no new complains. Needs to eat bettter.    Plan of Care:    Acute kidney injury likely hemodynamically mediated, c.diff colitis  Kidney transplant  --urine sodium low implicating renal hypoperfusion   --avoid NSAIDS, Sales II inhibitors, and other non-essential nephrotoxic agents  --renal dose medication for crcl 10-50  --keep map above 55  --treat c.diff, appreciate ID input  --continue IS for kidney transplant.    Nongap metabolic acidosis likely combination of GI losses and renal tubular defect (urine pH inappropriately high)  --urine anion gap not accurate due to low urine sodium     PTLD lymphoma, s/p renal " transplant  Anemia  --tacrolimus level therapeutic  --appreciate Heme input    Hyponatremia  Hypokalemia  Acidosis  --added KCL and bicarb today, needs better oral intake, agree with clinimix.  --avoid hypotonic iv piggy backs  --no ssri antidepressants or thiazide diuretics    Thank you for allowing us to participate in this patient's care. We will continue to follow.    Vital Signs:  Temp Readings from Last 3 Encounters:   10/28/20 96.4 °F (35.8 °C)   10/10/20 98.3 °F (36.8 °C) (Oral)   10/09/20 98.6 °F (37 °C)       Pulse Readings from Last 3 Encounters:   10/28/20 83   10/10/20 95   10/09/20 (!) 56       BP Readings from Last 3 Encounters:   10/28/20 102/61   10/10/20 102/63   10/09/20 115/76       Weight:  Wt Readings from Last 3 Encounters:   10/24/20 83.5 kg (184 lb 1.4 oz)   10/10/20 68.5 kg (151 lb)   10/09/20 66.4 kg (146 lb 6.2 oz)       Past Medical & Surgical History:  Past Medical History:   Diagnosis Date    Acidosis     Adrenal adenoma     Anemia associated with chronic renal failure     Arrhythmia, onset 2015    Awaiting organ transplant status 2013    Basal cell carcinoma 2012    left nasal tip    Blood type B+ 2013    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  Renal allograft biopsy 17 (DIVINE): 21 glomeruli, none globally sclerosed, <5% interstitial fibrosis, no ACR, c4d negative, AVR CCT Type 2 (V1 lesion); plan THYMO     Dissecting aortic aneurysm (any part), abdominal     Diverticulosis     Encounter for blood transfusion     ESRD (end stage renal disease) 2010    H/O urethral stricture 2018    H/O: urethral stricture     History of AAA (abdominal aortic aneurysm) repair      History of urethral stricture 12/19/2018    Hypertension     Hypokalemia     Hypothyroidism 1/10/2014    Inguinal hernia bilateral, non-recurrent     Kidney stones     Organ transplant candidate 11/26/2013    Plantar warts 1/10/2014    Recurrent UTI 7/28/2017    S/P kidney transplant     Secondary hyperparathyroidism, renal     Sepsis 10/24/2020    Thyroid disease        Past Surgical History:   Procedure Laterality Date    ABDOMINAL SURGERY      exploratory lapatomy x 2    ABLATION N/A 8/22/2019    Procedure: ABLATION, SVT;  Surgeon: Emerson Mcmanus MD;  Location: [facility] EP LAB;  Service: Cardiology;  Laterality: N/A;  SVT, RFA, CARTO, anes, GP, 323    AORTA - SUPERIOR MESENTERIC ARTERY BYPASS GRAFT      BLADDER NECK RECONSTRUCTION      BLADDER SURGERY      COLONOSCOPY  10/10/2013    Dr. Gutierrez, repeat in 5 years    CYSTOSCOPY      CYSTOSCOPY N/A 12/19/2018    Procedure: CYSTOSCOPY;  Surgeon: Dewey Mann MD;  Location: [facility] OR [address] [place] [address];  Service: Urology;  Laterality: N/A;  45 min    CYSTOURETHROSCOPY WITH DIRECT VISION INTERNAL URETHROTOMY N/A 12/19/2018    Procedure: CYSTOSCOPY, WITH DIRECT VISION INTERNAL URETHROTOMY;  Surgeon: Dewey Mann MD;  [facility];  Service: Urology;  Laterality: N/A;    DILATION OF URETHRA N/A 12/19/2018    Procedure: DILATION, URETHRA;  Surgeon: Dewey Mann MD;  [facility];  Service: Urology;  Laterality: N/A;    EXCISIONAL BIOPSY N/A 7/13/2020    Procedure: EXCISIONAL BIOPSY- LEFT INGUINAL NODE;  Surgeon: Vlad Epstein MD;  [facility];  Service: General;  Laterality: N/A;    FLEXIBLE CYSTOSCOPY N/A 11/6/2019    Procedure: CYSTOSCOPY, FLEXIBLE;  Surgeon: Dewey Mann MD;  Location: [facility] OR [facility] FLR;  Service: Urology;  Laterality: N/A;    GASTROJEJUNOSTOMY      HEMORRHOID SURGERY      HERNIA REPAIR      INSERTION OF VENOUS ACCESS PORT Left 7/27/2020    Procedure: INSERTION, VENOUS ACCESS PORT;   Surgeon: Vlad Epstein MD;  Location: [address];  Service: General;  Laterality: Left;    KIDNEY TRANSPLANT      LEFT HEART CATHETERIZATION Left 8/20/2019    Procedure: Left heart cath;  Surgeon: Javan Oscar MD;  Location: [facility] CATH/EP LAB;  Service: Cardiology;  Laterality: Left;    LITHOTRIPSY      LYMPH NODE BIOPSY N/A 6/30/2020    Procedure: BIOPSY, LYMPH NODE;  [facility];  Location: [address];  Service: General;  Laterality: N/A;  189 lymph node biopsy /ULTRASOUND    PERCUTANEOUS NEPHROLITHOTRIPSY      right  ESWL  10/31/12    right ESWL  6/26/12    URETHROPLASTY USING PATCH GRAFT N/A 11/6/2019    Procedure: URETHROPLASTY, USING PATCH GRAFT BUCCAL MUCOSA GRAFT;  Surgeon: Dewey Mann MD;  Location: [address];  Service: Urology;  Laterality: N/A;  3 HOURS       Past Social History:  Social History     Socioeconomic History    Marital status: Single     Spouse name: Not on file    Number of children: Not on file    Years of education: Not on file    Highest education level: Not on file   Occupational History     Employer: Disabled   Social Needs    Financial resource strain: Not on file    Food insecurity     Worry: Not on file     Inability: Not on file    Transportation needs     Medical: Not on file     Non-medical: Not on file   Tobacco Use    Smoking status: Former Smoker     Packs/day: 0.50     Years: 40.00     Pack years: 20.00     Types: Cigarettes     Quit date: 6/16/2010     Years since quitting: 10.3    Smokeless tobacco: Never Used   Substance and Sexual Activity    Alcohol use: Yes     Alcohol/week: 3.0 standard drinks     Types: 3 Cans of beer per week     Comment: occasional/social    Drug use: No     Comment: THC in youth    Sexual activity: Yes     Partners: Female     Birth control/protection: None   Lifestyle    Physical activity     Days per week: Not on file     Minutes per session: Not on file    Stress: Not  on file   Relationships    Social connections     Talks on phone: Not on file     Gets together: Not on file     Attends Hinduism service: Not on file     Active member of club or organization: Not on file     Attends meetings of clubs or organizations: Not on file     Relationship status: Not on file   Other Topics Concern    Not on file   Social History Narrative    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      Current Outpatient Medications on File Prior to Encounter   Medication Sig Dispense Refill    allopurinoL (ZYLOPRIM) 300 MG tablet Take 1 tablet (300 mg total) by mouth once daily. 30 tablet 2    aspirin (ECOTRIN) 81 MG EC tablet Take 1 tablet (81 mg total) by mouth once daily.  0    calcitRIOL (ROCALTROL) 0.5 MCG Cap Take 1 capsule (0.5 mcg total) by mouth once daily. 30 capsule 11    cefpodoxime (VANTIN) 100 MG tablet Take 1 tablet (100 mg total) by mouth every 12 (twelve) hours. 60 tablet 3    COMBIGAN 0.2-0.5 % Drop Place 1 drop into both eyes 2 (two) times a day.       famotidine (PEPCID) 20 MG tablet TAKE 1 TABLET EVERY EVENING (Patient taking differently: Take 20 mg by mouth every evening. ) 90 tablet 3    ketoconazole (NIZORAL) 200 mg Tab TAKE ONE-HALF (1/2) TABLET ONCE DAILY (Patient taking differently: Take 100 mg by mouth once daily. ) 45 tablet 3    levothyroxine (SYNTHROID) 100 MCG tablet TAKE 1 TABLET DAILY (Patient taking differently: Take 100 mcg by mouth before breakfast. Administer on an empty stomach at least 30 minutes before food. If receiving tube feeds, HOLD tube feeds for 1 hour before and after levothyroxine administration.) 90 tablet 3    magnesium oxide (MAG-OX) 400 mg (241.3 mg magnesium) tablet Take 1 tablet (400 mg total) by mouth once daily. 90 tablet 3    multivitamin (ONE DAILY MULTIVITAMIN) per tablet Take 1 tablet by mouth once daily.      ondansetron  (ZOFRAN-ODT) 8 MG TbDL Dissolve 1 tablet (8 mg total) by mouth every 12 (twelve) hours as needed. (Patient taking differently: Take 8 mg by mouth every 12 (twelve) hours as needed (nausea). ) 21 tablet 1    predniSONE (DELTASONE) 50 MG Tab Take 2 tablets (100 mg total) by mouth once daily. Take on days 2-5 of your chemotherapy cycles. 8 tablet 0    sodium bicarbonate 650 MG tablet Take 1 tablet (650 mg total) by mouth 2 (two) times daily. 540 tablet 3    tacrolimus (PROGRAF) 1 MG Cap Take 3 capsules (3 mg total) by mouth every morning AND 2 capsules (2 mg total) every evening. Z94.0/Kidney Transplant on 11/26/16. 150 capsule 11    travoprost (TRAVATAN Z) 0.004 % ophthalmic solution Place 1 drop into both eyes every evening.        Scheduled Meds:   allopurinoL  100 mg Oral Daily    aspirin  81 mg Oral Daily    brimonidine-timoloL  1 drop Both Eyes Q12H    calcitRIOL  0.5 mcg Oral Daily    cholestyramine-aspartame  1 packet Oral TID    dicyclomine  10 mg Oral QID    famotidine  20 mg Oral QHS    fat emulsion 20%  250 mL Intravenous Daily    ferrous sulfate  325 mg Oral BID    levothyroxine  100 mcg Oral Before breakfast    metronidazole  500 mg Intravenous Q8H    midodrine  10 mg Oral TID    tacrolimus  2 mg Oral Daily PM    tacrolimus  3 mg Oral Daily AM    travoprost  1 drop Both Eyes QHS    vancomycin  125 mg Oral Q6H     Continuous Infusions:   Amino acid 4.25% - dextrose 5% (CLINIMIX-E) solution with additives (1L provides 42.5 gm AA, 50 gm CHO (170 kcal/L dextrose), Na 35, K 30, Mg 5, Ca 4.5, Acetate 70, Cl 39, Phos 15) 85 mL/hr at 10/28/20 0500     PRN Meds:.acetaminophen, albuterol-ipratropium, hyoscyamine, magnesium sulfate IVPB, magnesium sulfate IVPB, metoclopramide HCl, morphine, ondansetron, potassium chloride in water **AND** potassium chloride in water **AND** potassium chloride in water, promethazine, sodium chloride 0.9%    Allergies:  Patient has no known allergies.    Past  "Family History:  Reviewed; refer to Hospitalist Admission Note    Review of Systems:  Review of Systems - All 14 systems reviewed and negative, except as noted in HPI    Physical Exam:    /61   Pulse 83   Temp 96.4 °F (35.8 °C)   Resp 18   Ht 5' 11" (1.803 m)   Wt 83.5 kg (184 lb 1.4 oz)   SpO2 98%   BMI 25.67 kg/m²     General Appearance:    Alert, cooperative, no distress, appears stated age   Head:    Normocephalic, without obvious abnormality, atraumatic   Eyes:    PER, conjunctiva/corneas clear, EOM's intact in both eyes        Throat:   Lips, mucosa, and tongue normal; teeth and gums normal   Back:     Symmetric, no curvature, ROM normal, no CVA tenderness   Lungs:     Clear to auscultation bilaterally, respirations unlabored   Chest wall:    No tenderness or deformity   Heart:    Regular rate and rhythm, S1 and S2 normal, no murmur, rub   or gallop   Abdomen:     Tender to palpation   Extremities:   Extremities normal, atraumatic, no cyanosis or edema   Pulses:   2+ and symmetric all extremities   MSK:   No joint or muscle swelling, tenderness or deformity   Skin:   Skin color, texture, turgor normal, no rashes or lesions   Neurologic:   CNII-XII intact, normal strength and sensation       Throughout.  No flap     Results:  Lab Results   Component Value Date     10/28/2020    K 3.2 (L) 10/28/2020     (H) 10/28/2020    CO2 12 (L) 10/28/2020    BUN 36 (H) 10/28/2020    CREATININE 2.2 (H) 10/28/2020    CALCIUM 7.6 (L) 10/28/2020    ANIONGAP 10 10/28/2020    ESTGFRAFRICA 34 (A) 10/28/2020    EGFRNONAA 29 (A) 10/28/2020       Lab Results   Component Value Date    CALCIUM 7.6 (L) 10/28/2020    PHOS 2.8 10/21/2020       Recent Labs   Lab 10/28/20  0433   WBC 5.16   RBC 3.04*   HGB 9.9*   HCT 30.2*      MCV 99*   MCH 32.6*   MCHC 32.8          I have personally reviewed pertinent radiological imaging and reports.       "

## 2020-10-28 NOTE — NURSING
Monitor room called and said pt went into afib. HR currently 105. Dr. Summers notified, will continue to monitor HR.

## 2020-10-28 NOTE — PLAN OF CARE
Pt is currently resting comfortably, he at a little bit of supper last night, flexiseal is in place and flowing freely with no obstructions. Pt has verbalized understanding of call light system and has no c/o pain, no obvious s/s of distress. Will continue to monitor.
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
## 2020-10-28 NOTE — PROGRESS NOTES
"[facility]  Adult Nutrition  Progress Note    SUMMARY     Intervention: general healthful diet, nutrition education, parenteral nutrition therapy, and prescription medication- appetite stimulant      Recommendation:   1) Encourage PO intakes on regular diet   2) change supplements to Boost plus + ice cream shakes BID  3) Continue appetite stimulant     4) supplemental nutrition support needed: Pt refused NGT, continue PPN short term  D 5 AA 4.25 @ 85 ml/hr + standard lipids  ( 1192 kcal ( 62% EEN), 86 g protein ( 100% EPN))     5) If nutrition support needed longterm discuss PEG with pt  Isosource 1.5 @ 20 ml/hr advancing to goal of 50 ml/hr + 180 ml flush q 4 hr  ( provides 81 g protein ( 100% EEN), 1800 kcal ( 94% EEN), and 912 ml free water)     Goals: 1) PO intakes 50% of meals and supplements at f/u 2) PO intakes > 50% of meals or supplemental nutrition support continues  Nutrition Goal Status: not met/ met-continues  Communication of RD Recs: reviewed with RN (POC, sticky note)     Reason for Assessment     Reason For Assessment: follow up  Diagnosis: (septic shock)  Relevant Medical History: PTLD s/p chemo, CKD 3, renal transplant 4 years ago  Interdisciplinary Rounds: did not attend     General Information Comments: 71 y/o male admitted with sepsis, neutropenia as well as diarrhea/ cramps x 2 weeks as well as fever, poor appetite, and malaise x 1 week. Last chemo, CHOP, end of last week. NFPE done 10/20/20 mild-severe wasting seen. Pt within UBW range. Tells me he has had a, "poor appetite for years," worse this past week. Only ate 2 bites of his oatmeal.  10/21 Pt not eating on regular diet, poor appetite parenteral nutrition initiated as pt declined NGT  10/26  Pt still c/o poor appetite despite appetite stimulant, PO intakes 0-25%. PPN reinitiated per discussion with MD, rec. Discussion about longterm nutrition support with pt. Diarrhea improving.  10/28/20 C. Diff ( +). Pt still " "complains that he has no appetite, denies stomach trouble at this time, pain and stool improving. PPN continues. Pt agreeable to try Boost mixed with ice cream, no taking boost pudding. Appetite stimulant no longer ordered in Epic, discussed reordering with RN.     Nutrition Discharge Planning: to be determined- Regular diet + boost plus + ice cream TID     Nutrition Risk Screen     Nutrition Risk Screen: no indicators present     Nutrition/Diet History     Spiritual, Cultural Beliefs, Confucianism Practices, Values that Affect Care: no  Food Allergies: NKFA  Factors Affecting Nutritional Intake: decreased appetite     Anthropometrics     Height Method: Measured(10/2/20)  Height: 5' 11"  Height (inches): 71 in  Weight Method: Bed Scale  Weight: 83.5 10/24, 68.1 kg (admission)  Weight (lb): 184 lb ( edema noted)  Ideal Body Weight (IBW), Male: 172 lb  BMI (Calculated): 20.9 admission  BMI Grade: 18.5-24.9 - normal  Usual Body Weight (UBW), k kg(20)  Weight Change Amount: (65.8 kg 10/2/20 and 10/30/19)     Lab/Procedures/Meds     Pertinent Labs Reviewed: reviewed  BMP  Lab Results   Component Value Date     10/28/2020    K 3.2 (L) 10/28/2020     (H) 10/28/2020    CO2 12 (L) 10/28/2020    BUN 36 (H) 10/28/2020    CREATININE 2.2 (H) 10/28/2020    CALCIUM 7.6 (L) 10/28/2020    ANIONGAP 10 10/28/2020    ESTGFRAFRICA 34 (A) 10/28/2020    EGFRNONAA 29 (A) 10/28/2020     Glucose:  mg/dl x 24 hr  Lab Results   Component Value Date    ALBUMIN 1.4 (L) 10/21/2020          Pertinent Medications Reviewed: reviewed  Calcitriol, iron, Mg sulfate, zofran, KCl, promethazine     Estimated/Assessed Needs     Weight Used For Calorie Calculations: 68.1 kg (estimated dry weight)  Energy Calorie Requirements (kcal): MSJ ( x 1.3-1.4 wt maintenance) = 2826-5377 kcal  Energy Need Method: Archuleta-St Jeor, Kcal/kg  Protein Requirements: 1.2 g protein/kg ( CA, wasting, vs. CKD) = 81 g protein  Weight Used For Protein " Calculations: 68.1 kg (150 lb 2.1 oz)  Fluid Requirements (mL): 2000 ml or per MD  Estimated Fluid Requirement Method: RDA Method  CHO Requirement: N/A        Nutrition Prescription Ordered     Current Diet Order: Regular + boost pudding TID + PPN above     Evaluation of Received Nutrient/Fluid Intake     Energy Calories Required: sometimes meeting needs  Protein Required: meeting needs  Fluid Required: meeting needs  Tolerance: tolerating  % Intake of Estimated Energy Needs: 65-85%  % Meal Intake: 0-25% + PPN     Nutrition Risk     Level of Risk/Frequency of Follow-up: moderate 2 x weekly      Assessment and Plan     Moderate malnutrition  Contributing Nutrition Diagnosis  Moderate chronic illness related malnutrition     Related to (etiology):   Increased needs d/t CA and decreased appetite     Signs and Symptoms (as evidenced by):   1) PO intakes < 75% x > 1 month  2) mild-moderate muscle/ fat wasting as charted below     Interventions:  Above     Nutrition Diagnosis Status:   continues        Monitor and Evaluation     Food and Nutrient Intake: energy intake, food and beverage intake  Food and Nutrient Adminstration: diet order, parenteral nutrition therapy  Anthropometric Measurements: weight  Biochemical Data, Medical Tests and Procedures: electrolyte and renal panel, gastrointestinal profile, glucose, GI function  Nutrition-Focused Physical Findings: overall appearance      Malnutrition Assessment  Malnutrition Type: chronic illness  Skin (Micronutrient): (Brandon = 13)  Teeth (Micronutrient): (missing some)   Micronutrient Evaluation: suspected deficiency  Micronutrient Evaluation Comments: Na, check iron   Energy Intake (Malnutrition): less than 75% for greater than or equal to 1 month   Orbital Region (Subcutaneous Fat Loss): moderate depletion  Upper Arm Region (Subcutaneous Fat Loss): moderate depletion  Thoracic and Lumbar Region: mild depletion   Confucianism Region (Muscle Loss): moderate depletion(severe  buccal)  Clavicle Bone Region (Muscle Loss): moderate depletion  Clavicle and Acromion Bone Region (Muscle Loss): moderate depletion  Scapular Bone Region (Muscle Loss): moderate depletion  Dorsal Hand (Muscle Loss): mild depletion  Patellar Region (Muscle Loss): well nourished  Anterior Thigh Region (Muscle Loss): well nourished  Posterior Calf Region (Muscle Loss): well nourished     Edema: 1-3+  Subcutaneous Fat Loss (Final Summary): moderate protein-calorie malnutrition  Muscle Loss Evaluation (Final Summary): moderate protein-calorie malnutrition       Nutrition Follow-Up       yes

## 2020-10-28 NOTE — PLAN OF CARE
Intervention: general healthful diet, nutrition education, parenteral nutrition therapy, and prescription medication- appetite stimulant      Recommendation:   1) Encourage PO intakes on regular diet   2) change supplements to Boost plus + ice cream shakes BID  3) Continue appetite stimulant     4) supplemental nutrition support needed: Pt refused NGT, continue PPN short term  D 5 AA 4.25 @ 85 ml/hr + standard lipids  ( 1192 kcal ( 62% EEN), 86 g protein ( 100% EPN))     5) If nutrition support needed longterm discuss PEG with pt  Isosource 1.5 @ 20 ml/hr advancing to goal of 50 ml/hr + 180 ml flush q 4 hr  ( provides 81 g protein ( 100% EEN), 1800 kcal ( 94% EEN), and 912 ml free water)     Goals: 1) PO intakes 50% of meals and supplements at f/u 2) PO intakes > 50% of meals or supplemental nutrition support continues  Nutrition Goal Status: not met/ met-continues  Communication of RD Recs: reviewed with RN (POC, sticky note)

## 2020-10-28 NOTE — CARE UPDATE
10/28/20 0822   PRE-TX-O2   O2 Device (Oxygen Therapy) room air   SpO2 98 %   Pulse Oximetry Type Intermittent   $ Pulse Oximetry - Multiple Charge Pulse Oximetry - Multiple

## 2020-10-28 NOTE — PT/OT/SLP PROGRESS
Occupational Therapy   Treatment    Name: Alin Burkett  MRN: [id number]  Admitting Diagnosis:  C. difficile colitis       Recommendations:     Discharge Recommendations: nursing facility, skilled  Discharge Equipment Recommendations:  walker, rolling, bedside commode  Barriers to discharge:  Decreased caregiver support    Assessment:     Alin Burkett is a 70 y.o. male with a medical diagnosis of C. difficile colitis.  He presents with motivation to mobilize and get to chair, increased mood, careful movements, and great return demo of instructions. Co-tx with PTA, Lola Cotton. Performance deficits affecting function are weakness, impaired endurance, impaired self care skills, impaired functional mobilty, gait instability, impaired balance, decreased coordination, decreased upper extremity function, decreased lower extremity function, edema.     Rehab Prognosis:  Good; patient would benefit from acute skilled OT services to address these deficits and reach maximum level of function.       Plan:     Patient to be seen 5 x/week to address the above listed problems via self-care/home management, therapeutic activities, therapeutic exercises  · Plan of Care Expires: 11/06/20  · Plan of Care Reviewed with: patient    Subjective     Pain/Comfort:  · Pain Rating 1: other (see comments)(unrated)  · Location - Orientation 1: lower  · Location 1: elbow  · Pain Addressed 1: Reposition, Distraction, Cessation of Activity  · Pain Rating Post-Intervention 1: other (see comments)(unrated)    Objective:     Communicated with: Shelley CARR prior to session.  Patient found HOB elevated with telemetry, PICC line, peripheral IV upon OT entry to room.    General Precautions: Standard, fall, contact   Orthopedic Precautions:N/A   Braces: N/A     Occupational Performance:     Bed Mobility:    · Patient completed Scooting to EOB with stand by assistance  · Patient completed Supine to Sit with minimum assistance     Functional  Mobility/Transfers:  · Patient completed Sit <> Stand Transfer with minimum assistance  with  rolling walker and VC to push from start surface and to rock forward twice before standing.   · Functional Mobility: FAIR; pt with posterior and lateral LOB which he is able to right with CGA/Danyelle. Pt performed lateral weight shifting x 10 each side after VERDE demo. Pt with more steady dynamic stand after weight shifting. Marching in place at command of PTA also occurred during this stand with RW. Pt declines sitting before attempting to transfer to chair. PTA led transfer to chair.    Activities of Daily Living:  · Pt declines eating at this time but agrees to eat today as he told Dr. Tolbert he would do. VERDE ed for increased nutrition to increase energy level and aid in healing and rehab. Pt v/u and agreeable.    [facility] 6 Click ADL: 16    Treatment & Education:  -At EOB pt completed 10 shoulder rolls and 10 scapular retraction as well as some lateral neck stretching prior to sit<>stand transfer.   -pt is very motivated today. He is also concerned with all of the fluid he is retaining in his BLE which PTA addressed.   -pt is very cold and donned 3 blanket before our departure from room.    Patient left up in chair with recliner in use with all lines intact, call button in reach, chair alarm off and RNShelley notifiedEducation:      GOALS:   Multidisciplinary Problems     Occupational Therapy Goals        Problem: Occupational Therapy Goal    Goal Priority Disciplines Outcome Interventions   Occupational Therapy Goal     OT, PT/OT Ongoing, Progressing    Description: Goals to be met by: 11/6/2020    Patient will increase functional independence with ADLs by performing:    UE Dressing with Set-up Assistance.  LE Dressing with Minimal Assistance.  Grooming while seated with Set-up Assistance.  Toileting from toilet with Minimal Assistance for hygiene and clothing management.   Sitting at edge of bed x 15 minutes with  Supervision.  Toilet transfer to toilet with Minimal Assistance.                     Time Tracking:     OT Date of Treatment: 10/28/20  OT Start Time: 1012  OT Stop Time: 1047  OT Total Time (min): 35 min    Billable Minutes:Self Care/Home Management 18 min    AMMON Méndez/LAURA  10/28/2020

## 2020-10-28 NOTE — PT/OT/SLP PROGRESS
Physical Therapy Treatment    Patient Name:  Alin Burkett   MRN:  [id number]    Recommendations:     Discharge Recommendations:  nursing facility, skilled   Discharge Equipment Recommendations: walker, rolling   Barriers to discharge: None    Assessment:     Alin Burkett is a 70 y.o. male admitted with a medical diagnosis of C. difficile colitis.  He presents with the following impairments/functional limitations:  weakness, impaired endurance, impaired functional mobilty, gait instability, impaired balance, decreased upper extremity function, decreased lower extremity function, edema  Rehab Prognosis: Fair; patient would benefit from acute skilled PT services to address these deficits and reach maximum level of function.    Recent Surgery: * No surgery found *      Plan:     During this hospitalization, patient to be seen 6 x/week to address the identified rehab impairments via gait training, therapeutic activities, therapeutic exercises and progress toward the following goals:    · Plan of Care Expires:  11/30/20    Subjective     Chief Complaint: being cold, swelling in his feet and he weight of his legs being heavy  Patient/Family Comments/goals: to start moving more   Pain/Comfort:  · Pain Rating 1: 0/10      Objective:     Communicated with Shelley prior to session.  Patient found supine with peripheral IV, telemetry(rectal tube and multiple iv lines) upon PT entry to room.     General Precautions: Standard, contact   Orthopedic Precautions:N/A   Braces:       Functional Mobility:  · Bed Mobility:     · Rolling Left:  stand by assistance  · Scooting: contact guard assistance  · Supine to Sit: contact guard assistance  · Transfers:     · Bed to Chair: minimum assistance with  rolling walker  using  Stand Pivot  · Gait: ppt took 3-4 steps with fww, vc's for step sequence and safety backing up to feel chair , reach before sitting  · Balance: cg with fww support      AM-P[facility] 6 CLICK MOBILITY          Therapeutic  Activities and Exercises:   Pt demo fair/good static sitting balance at EOB, performed 10 hip flexion/marching, 10-12 reps of laq, ankle pumps and heel raises x 10.     Patient left up in chair with all lines intact, call button in reach and Shelley notified..    GOALS:   Multidisciplinary Problems     Physical Therapy Goals        Problem: Physical Therapy Goal    Goal Priority Disciplines Outcome Goal Variances Interventions   Physical Therapy Goal     PT, PT/OT Ongoing, Progressing     Description: Goals to be met by: 2020     Patient will increase functional independence with mobility by performin. Supine to sit with MInimal Assistance  2. Sit to stand transfer with Minimal Assistance  3. Bed to chair transfer with Minimal Assistance using Rolling Walker  4. Gait  x 250 feet with Minimal Assistance using Rolling Walker.   5. Lower extremity exercise program x20 reps                      Time Tracking:     PT Received On: 10/28/20  PT Start Time: 1018     PT Stop Time: 1047  PT Total Time (min): 29 min     Billable Minutes: Therapeutic Activity 15 min, yoo present for tx.     Treatment Type: Treatment  PT/PTA: PTA     PTA Visit Number: 2     Lola Pryor, PTA  10/28/2020

## 2020-10-29 PROBLEM — R50.81 FEBRILE NEUTROPENIA: Status: RESOLVED | Noted: 2020-01-01 | Resolved: 2020-01-01

## 2020-10-29 PROBLEM — D70.9 FEBRILE NEUTROPENIA: Status: RESOLVED | Noted: 2020-01-01 | Resolved: 2020-01-01

## 2020-10-29 NOTE — PROGRESS NOTES
"[facility] Medicine  Progress Note    Patient Name: Alin Burkett  MRN: [id number]  Patient Class: IP- Inpatient   Admission Date: 10/15/2020  Length of Stay: 14 days  Attending Physician: Juila Summers MD  Primary Care Provider: Carmen Krueger MD        Subjective:     Principal Problem:C. difficile colitis        HPI:  Patient has been having diarrhea with "jelly-like" consistency as well as crampiness in the abdomen.  Symptoms began roughly one week ago.  Also has had fever and malaise.  Appetite poor.  Fatigue as well.  He has been receiving chemo for PTLD; most recent cycle of CHOP was end of last week.  He has history of renal transplant four years ago and is currently on twice-a-day  Prograf.  He's had no cough, no unusual headache, no sinus pain, and no burning on urination.  He feels that he's probably dehydrated.    Overview/Hospital Course:  No notes on file    Interval History:  Continues to have loose stools.  Has a rectal tube in place, the stool appears to be blood tinged.  Vitals stable. Low grade temp.Went into atrial fib  WBC    Review of Systems   Constitutional: Positive for fatigue. Negative for appetite change, chills and fever.   HENT: Negative for congestion, hearing loss, rhinorrhea, sore throat, trouble swallowing and voice change.    Respiratory: Negative for cough, chest tightness, shortness of breath and wheezing.    Cardiovascular: Negative for chest pain, palpitations and leg swelling.   Gastrointestinal: Positive for abdominal pain and diarrhea. Negative for blood in stool, nausea and vomiting.   Genitourinary: Negative for difficulty urinating, frequency, hematuria and urgency.   Musculoskeletal: Negative for back pain, joint swelling and neck stiffness.   Skin: Negative for pallor and rash.   Neurological: Negative for tremors, seizures, syncope, speech difficulty, weakness, numbness and headaches.   Hematological: Negative for adenopathy. "   Psychiatric/Behavioral: Negative for agitation, behavioral problems, confusion and sleep disturbance.     Objective:     Vital Signs (Most Recent):  Temp: 99.4 °F (37.4 °C) (10/28/20 1226)  Pulse: 102 (10/28/20 1226)  Resp: 18 (10/28/20 1226)  BP: 106/73 (10/28/20 1226)  SpO2: 97 % (10/28/20 1226) Vital Signs (24h Range):  Temp:  [96.4 °F (35.8 °C)-99.4 °F (37.4 °C)] 99.4 °F (37.4 °C)  Pulse:  [] 102  Resp:  [12-18] 18  SpO2:  [90 %-98 %] 97 %  BP: ()/(61-74) 106/73     Weight: 83.5 kg (184 lb 1.4 oz)  Body mass index is 25.67 kg/m².    Intake/Output Summary (Last 24 hours) at 10/28/2020 1238  Last data filed at 10/28/2020 0800  Gross per 24 hour   Intake 2475.96 ml   Output 1450 ml   Net 1025.96 ml      Physical Exam  Vitals signs and nursing note reviewed.   Constitutional:       General: He is not in acute distress.     Appearance: He is ill-appearing. He is not diaphoretic.   HENT:      Head: Normocephalic and atraumatic.      Mouth/Throat:      Mouth: Mucous membranes are dry.   Eyes:      General: No scleral icterus.        Right eye: No discharge.         Left eye: No discharge.      Pupils: Pupils are equal, round, and reactive to light.   Neck:      Vascular: No JVD.   Cardiovascular:      Rate and Rhythm: Normal rate and regular rhythm.   Pulmonary:      Effort: Pulmonary effort is normal.      Breath sounds: Normal breath sounds.   Abdominal:      General: Bowel sounds are normal. There is no distension.      Palpations: Abdomen is soft.      Tenderness: There is abdominal tenderness in the left upper quadrant and left lower quadrant. There is no rebound.   Musculoskeletal:      Right lower leg: No edema.      Left lower leg: No edema.   Skin:     General: Skin is warm.      Capillary Refill: Capillary refill takes less than 2 seconds.      Findings: No rash.   Neurological:      General: No focal deficit present.      Mental Status: He is alert.      Cranial Nerves: No cranial nerve  deficit.   Psychiatric:         Mood and Affect: Mood normal.         Significant Labs:   BMP:   Recent Labs   Lab 10/28/20  0433   *      K 3.2*   *   CO2 12*   BUN 36*   CREATININE 2.2*   CALCIUM 7.6*     CBC:   Recent Labs   Lab 10/27/20  0442 10/28/20  0433   WBC 5.52 5.16   HGB 9.3* 9.9*   HCT 28.8* 30.2*    195       Significant Imaging: I have reviewed all pertinent imaging results/findings within the past 24 hours.      Assessment/Plan:      * C. difficile colitis  Source is likely colitis from C.difficile.   Map appears to be above 70.  Will continue oral vancomycin and IV metronidazole  Patient condition improving at this point  As per infectious disease will benefit from LTAC placement and will need vancomycin and metronidazole for 2 weeks      Microbiology Results (last 7 days)     Procedure Component Value Units Date/Time    Urine culture [[id number]] Collected: 10/28/20 1831    Order Status: No result Specimen: Urine Updated: 10/28/20 1859    Blood culture [[id number]] Collected: 10/28/20 1728    Order Status: Sent Specimen: Blood Updated: 10/28/20 1728            Acute renal failure superimposed on stage 3 chronic kidney disease  Creatinine stable  Possibly at ATN    BMP reviewed- noted Estimated Creatinine Clearance: 37.1 mL/min (A) (based on SCr of 2 mg/dL (H)). according to latest data.   Monitor UOP and serial BMP and adjust therapy as needed. Renally dose meds.  Results for TANG LAUREN (MRN [id number]) as of 10/24/2020 16:35        Hypotension  Most likely from hypovolemia.  Improved after receiving 1 L normal saline bolus  Says component of autonomic dysfunction  Will continue midodrine      Iron deficiency  History noted  Will follow Hematology recommendation      Moderate malnutrition  Nutrition consulted. Body mass index is 24.63 kg/m².. Encourage maximal PO intake. Diet supplementation ordered per nutrition approval. Will encourage PO and monitor closely for  weight changes.      Anemia in stage 3 chronic kidney disease  Patient's anemia is currently controlled. Has not received any PRBCs to date.. Etiology likely d/t  anemia of chronic disease  Current CBC reviewed-   Lab Results   Component Value Date    HGB 9.3 (L) 10/29/2020    HCT 28.5 (L) 10/29/2020     Monitor serial CBC and transfuse if patient becomes hemodynamically unstable, symptomatic or H/H drops below 7/21.         Anemia due to chemotherapy  Patient's anemia is currently controlled. Has not received any PRBCs to date.. Etiology likely d/t chemo no also has iron-deficiency anemia, possibly has GI bleed  Continue ferrous sulfate 325mg PO BID  Current CBC reviewed-   Lab Results   Component Value Date    HGB 9.3 (L) 10/29/2020    HCT 28.5 (L) 10/29/2020     Monitor serial CBC and transfuse if patient becomes hemodynamically unstable, symptomatic or H/H drops below 7/21.         Hypokalemia due to excessive gastrointestinal loss of potassium  Resolved Give supplement and monitor level.    Potassium   Date Value Ref Range Status   10/29/2020 3.2 (L) 3.5 - 5.1 mmol/L Final   10/28/2020 3.2 (L) 3.5 - 5.1 mmol/L Final         Post-transplant lymphoproliferative disorder (PTLD)  Has been receiving chemo for this.      -donor kidney transplant  Continue Prograf at usual dose.      Acquired hypothyroidism  Continue levothyroxine.    Metabolic acidosis  Acidosis persistent  Results for TANG LAUREN (MRN [id number]) as of 10/24/2020 16:35   Ref. Range 10/24/2020 03:50   CO2 Latest Ref Range: 23 - 29 mmol/L 13 (L)   Anion Gap Latest Ref Range: 8 - 16 mmol/L 10      Was most likely secondary to diarrhea  Nephrology on the case  Will continue sodium bicarb      VTE Risk Mitigation (From admission, onward)         Ordered     IP VTE LOW RISK PATIENT  Once      10/15/20 2220                Discharge Planning   TRINH: 10/26/2020     Code Status: Full Code   Is the patient medically ready for discharge?: Yes     Reason for patient still in hospital (select all that apply): Patient trending condition and Treatment  Discharge Plan A: Home with family                  Julia Summers MD  Department of Hospital Medicine   [facility]

## 2020-10-29 NOTE — ASSESSMENT & PLAN NOTE
Improved  Monitor leukocyte count.  Hematology on the case  Granix given today.;    WBC   Date Value Ref Range Status   10/29/2020 5.73 3.90 - 12.70 K/uL Final

## 2020-10-29 NOTE — ASSESSMENT & PLAN NOTE
Creatinine stable  Possibly at ATN    BMP reviewed- noted Estimated Creatinine Clearance: 37.1 mL/min (A) (based on SCr of 2 mg/dL (H)). according to latest data.   Monitor UOP and serial BMP and adjust therapy as needed. Renally dose meds.  Results for ARLETTE LAURENYD XIN SANCHEZ (MRN [id number]) as of 10/24/2020 16:35

## 2020-10-29 NOTE — PLAN OF CARE
10/29/20 6790   Patient Assessment/Suction   Level of Consciousness (AVPU) alert   Respiratory Effort Normal;Unlabored   Expansion/Accessory Muscles/Retractions no retractions;no use of accessory muscles   All Lung Fields Breath Sounds diminished;Anterior:;Lateral:   PRE-TX-O2   O2 Device (Oxygen Therapy) room air   SpO2 97 %   Pulse Oximetry Type Intermittent   $ Pulse Oximetry - Multiple Charge Pulse Oximetry - Multiple   Pulse 80   Resp 18   Aerosol Therapy   $ Aerosol Therapy Charges PRN treatment not required   Respiratory Treatment Status (SVN) PRN treatment not required

## 2020-10-29 NOTE — PLAN OF CARE
POC reviewed with patient. Patient verbalizes understanding. Safety and tele maintained throughout shift. Flexiseal in place. No c/o pain. Call light within reach. Will continue to monitor.

## 2020-10-29 NOTE — ASSESSMENT & PLAN NOTE
Source is likely colitis from C.difficile.   Map appears to be above 70.  Will continue oral vancomycin and IV metronidazole  Patient condition improving at this point  As per infectious disease will benefit from LTAC placement and will need vancomycin and metronidazole for 2 weeks      Microbiology Results (last 7 days)     Procedure Component Value Units Date/Time    Urine culture [[id number]] Collected: 10/28/20 1831    Order Status: No result Specimen: Urine Updated: 10/28/20 1859    Blood culture [[id number]] Collected: 10/28/20 1728    Order Status: Sent Specimen: Blood Updated: 10/28/20 1728

## 2020-10-29 NOTE — ASSESSMENT & PLAN NOTE
Resolved Give supplement and monitor level.    Potassium   Date Value Ref Range Status   10/29/2020 3.2 (L) 3.5 - 5.1 mmol/L Final   10/28/2020 3.2 (L) 3.5 - 5.1 mmol/L Final

## 2020-10-29 NOTE — PROGRESS NOTES
Clinical Pharmacy Chart Review Note    Alin Burkett is a 70 y.o. male was rounded on 10/29/2020 by a pharmacist. The patient was thoroughly reviewed and monitored by the hospitalist and pharmacist to ensure medication for specific disease states and/or problems were properly prescribed, dispensed, administered, and monitored. Identified dosing adjustments were addressed, in addition to assessment of proper dosing based on current renal/hepatic function. Pharmacy will provide medication recommendations whenever necessary to the provider to ensure positive patient outcomes. High risk adverse effects were reviewed will all healthcare providers involved in this patient's care. A medication reconciliation and drug-interaction check has been completed by a pharmacist. Discrepancies will be addressed and resolved on an on-going bases and pharmacy will continue to follow this patient until discharge. Feel free to contact us if you have any questions.    Mony Davis, PharmD  Pharmacy Resident  [phone]

## 2020-10-29 NOTE — PLAN OF CARE
POC reviewed with pt.  Rested quietly.  Still having loose mucoid stools, flexiseal in place.  Swallowing without difficulty.  Call light within reach.

## 2020-10-29 NOTE — ASSESSMENT & PLAN NOTE
Patient's anemia is currently controlled. Has not received any PRBCs to date.. Etiology likely d/t chemo no also has iron-deficiency anemia, possibly has GI bleed  Continue ferrous sulfate 325mg PO BID  Current CBC reviewed-   Lab Results   Component Value Date    HGB 9.3 (L) 10/29/2020    HCT 28.5 (L) 10/29/2020     Monitor serial CBC and transfuse if patient becomes hemodynamically unstable, symptomatic or H/H drops below 7/21.

## 2020-10-29 NOTE — PROGRESS NOTES
"Progress Note  Infectious Disease    Reason for Consult:  C difficile colitis, neutropenia, sepsis    HPI: Alin Burkett is a   70 y.o. male who is status post renal transplant and is receiving chemotherapy for diffuse large B-cell lymphoma, presented to the emergency room on 10/15 with worsening of chronic diarrhea, abdominal cramps of 1 weeks duration.  He was found to be neutropenic with a white blood cell count of 0.2, volume depleted, hypokalemic, was cultured and given fluid resuscitation and vancomycin and cefepime.  He was admitted to the intensive care unit. He was recently given cefpodoxime to take prophylactically associated with his chemotherapy for recurring urinary tract infections.  Most recent positive urine culture was September 18th with E coli sensitive to 3rd generation cephalosporins carbapenems  He has had a hectic fever, stool for C difficile toxin was obtained and was resulted as positive today.  he has had a positive C difficile test before, August 2019.  White blood cells remain depressed at 0.1, platelets are depressed at 91,000, creatinine 1.6.  CT scan of the abdomen obtained last night shows severe proctocolitis without megacolon. Neupogen has been ordered. He is discouraged from eating by severe cramps.      10/17/2020 tmax is improved. Continues to be neutropenic Continues to have cramping, intermittent, abdominal pain. + diarrhea "lost count how many" He had refused vancomycin in am but decided to take it now  10/18/2020 afebrile,(103 on 16th)  ANC improved on granix 0---1029) he is feeling much better today.  Less abdominal pain.  Less loose stools.  10/19/2020.  Afebrile.  T-max 99°.  Less need for pressors.  WBC 5.  Creatinine slightly worse at 1.9.  Discussed with nurse.  Patient may have had some hallucinations earlier  10/20/2020.  Afebrile. Less abdominal pain. Less diarrhea. He feels his abdomen is still distended and does not allow him to take a deep breath. KUB  Is read " as below, I feel that transvere colon is a little bigger today. Creatinine has worsened. Bicarb is 9. Nephrologist is giving bicarb drip.  Las Vancomycin iv and cefepime were  given on 18   Tolerating vancomycin by mouth and metronidazole    10/21: interim reviewed d/w Dr. Jiménez. Requiring bicarb drip. WBC 14 after neupogen, platelets better. Cr stable. Severe hypoalbuminemia. KUB not ominous, CXR with blunted left CPA. Wife reports he is sleeping all of the time and not eating. I aroused him and he agreed to eat some pudding  10/22: interim reviewed. Renal function slightly worse, bicarb corrected to 18 on drip. Urine output is poor and incontinent, midodrine started. Not eating.   10/23:  Interim reviewed, afebrile, oxygenating normally, off norepinephrine since administration of my do drain.  Stools becoming more solid, 2 measured since 0 700, still having incontinence.  Able to do some physical therapy exercises but physical therapy recommends LTAC/rehab/SNF.  Urine output is good, creatinine has stabilized 2.8, white blood cells normal.  CO2 still low at 15.  No new imaging. Slept during visit, discussed with RN and wife.   10/24:  Afebrile, oxygenating well, sleeping and not eating, urine output is very good, creatinine improved, stools with improving form  10/25: low grade temp this afternoon. No stools recorded yet but nursing reports 10 small stools. Patient reports urinary incontinence but waiting until urge before trying to void. Offered condom catheter.. Creatinine a little better. No appetite. Very irritable about being pushed to eat.  10/26: afebrile. Continues to have incontinent loose stools. Declining much physical therapy. Referral sent to LTAC. Cr better. Questran was discontinued, not sure why. He is asleep and I did not disturb, but spoke with his brother.   10/27:alert and interactive this evening. After discussion, he intends to try to eat(he fears cramping) and get up to a chair tomorrow. D/w  patient and wife about LTAC. Discouraged going home with TPN.   10/29: no fever, sats good, 550 cc of stool recorded for yesterday, white blood cells normal, hemoglobin a little lower, creatinine is 2, urinalysis had 50 white cells, urine culture pending but this was obtained from a urinal and not as a clean-catch specimen and will be canceled., blood culture negative so far.  He ate less than 50% of his breakfast and has not been up to the chair as of 11:00 a.m..  He is agreeable to change anti spasmodic.  P.r.n.    Antibiotics (From admission, onward)    Start     Stop Route Frequency Ordered    10/23/20 1800  vancomycin 25 mg/mL oral solution 125 mg  (C. difficile Infection (CDI) Treatment Order Panel)      11/06 1759 Oral Every 6 hours 10/23/20 1623    10/16/20 1900  metronidazole IVPB 500 mg      -- IV Every 8 hours (non-standard times) 10/16/20 1755             EXAM & DIAGNOSTICS REVIEWED:   Vitals:     Temp:  [96.9 °F (36.1 °C)-99.4 °F (37.4 °C)]   Temp: 98.2 °F (36.8 °C) (10/29/20 0730)  Pulse: 80 (10/29/20 0730)  Resp: 18 (10/29/20 0730)  BP: 95/70 (10/29/20 0730)  SpO2: 97 % (10/29/20 0750)    Intake/Output Summary (Last 24 hours) at 10/29/2020 1056  Last data filed at 10/29/2020 0800  Gross per 24 hour   Intake 1832.7 ml   Output 1075 ml   Net 757.7 ml        General:  In NAD.  Alert and attends and is appropriate    Eyes:   anicteric, EOMI  ENT:   no oral lesions  Neck:  Supple   Lungs: Clear, no consolidation, rales, wheezes, rub  Heart:  RRR, no gallop/murmur/rub noted  Abd:  Soft, no guarding, active BS, no masses or organomegaly appreciated.  No tenderness. Rectal tube in place with liquid stool  :  Voids   no flank tenderness  Musc:  Joints without effusion, swelling, erythema, synovitis,    Skin:  No rashes.    Wound:   Neuro: Alert, conversant, non focal.    Psych:    more engaged in his own care.    Extrem: generalized edema, no erythema, phlebitis, cellulitis, warm and well  perfused  VAD:  portacath without redness, drainage, due for psych care 10/30    Isolation:  Special contact         General Labs reviewed:  Recent Labs   Lab 10/27/20  0442 10/28/20  0433 10/29/20  0444   WBC 5.52 5.16 5.73   HGB 9.3* 9.9* 9.3*   HCT 28.8* 30.2* 28.5*    195 196       Recent Labs   Lab 10/27/20  0442 10/28/20  0433 10/29/20  0444    137 136   K 3.4* 3.2* 3.2*   * 115* 115*   CO2 12* 12* 11*   BUN 35* 36* 39*   CREATININE 2.3* 2.2* 2.0*   CALCIUM 7.3* 7.6* 7.4*     Micro:  Microbiology Results (last 7 days)     Procedure Component Value Units Date/Time    Blood culture [[id number]] Collected: 10/28/20 1728    Order Status: Sent Specimen: Blood Updated: 10/29/20 1037    Urine culture [[id number]] Collected: 10/28/20 1831    Order Status: No result Specimen: Urine Updated: 10/28/20 1859        Imaging Reviewed:   KUBThree round radiodensities overlie the left upper quadrant may represent ingested pills or be in the patient's clothing.  A radiodense thread is noted over the right lower quadrant of uncertain significance.   CXR clear   CT abdomen and pelvis 10/16 :    Findings consistent with severe proctocolitis.    Transplanted kidney right side of the pelvis with mild pelvocaliectasis and perinephric stranding nonspecific.  No calcified stones seen Additional findings as detailed above including cystic lesion with in the native right kidney versus dilatation of collecting structure of the native right kidney.  Stones in the native right kidney.  Cystic lesion within the pancreas  Cardiology:    IMPRESSION & PLAN   1. Severe C. Difficile colitis, improved, but still having diarrhea despite aggressive treatment and questran   Prompted by oral antibiotics and/or chemotherapy   History of Cdiff 8/2019   Metabolic acidosis,improved    2. PTLD, EBV related lymphoma, receiving chemotherapy      neutropenia, resolved  3. S/p renal transplant on immunosuppression, [facility], Cr    improving  4. Chronic diarrhea  5. History of recurrent UTIs  6. anorexia    Recommendations:     IV flagyl (untl discharge) and   oral vancomyin   125 mg Q 6 for Cdiff for least another 10-14 days(perhaps longer) versus switching to Dificid    TID cholestyramine(  and probiotic     .  out of bed as able     appropriate for LTAC, not making nearly enough progress to go home, but wife adamant that she will have enough support. Counseled that going home on TPN is complex and puts his PAC at risk for infection and dehydration risks injury to his kidney transplant.

## 2020-10-29 NOTE — ASSESSMENT & PLAN NOTE
Nutrition consulted. Body mass index is 24.63 kg/m².. Encourage maximal PO intake. Diet supplementation ordered per nutrition approval. Will encourage PO and monitor closely for weight changes.

## 2020-10-29 NOTE — PROGRESS NOTES
" INPATIENT NEPHROLOGY PROGRESS  [facility] NEPHROLOGY    Alin Burkett  10/29/2020    Reason for consultation:  Acute kidney injury     History of Present Illness:  Patient has been having diarrhea with "jelly-like" consistency as well as crampiness in the abdomen.  Symptoms began roughly one week ago.  Also has had fever and malaise.  Appetite poor.  Fatigue as well.  He has been receiving chemo for PTLD; most recent cycle of CHOP was end of last week.  He has history of renal transplant four years ago and is currently on twice-a-day  Prograf.  He's had no cough, no unusual headache, no sinus pain, and no burning on urination.     10/20  Has some diarrhea and abdominal pain.  No nausea, chest pain, sob, new neuro symptoms, new joint pain.  He is very weak.    I told him that he had previously been seen by doctor Shonda and I would call him to see him.  He stated he didn't want to see Dr Merchant and requested that I become his nephrologist.    10/21  Not much change in renal function since yesterday. UOP 1.3L.  Sleeping quietly.  10/23  Sleeping.  1550 urine output.    10/24 VSS, no new complains. Appreciate ID and Heme input.  10/25 VSS, no new complains. sCr is better.  10/26 VSS, no new complains. Labs are acceptable. Lethargic at times.  10/27 VSS, no new complains.  10/28 VSS, no new complains. Needs to eat bettter.  10/29 VSS, no new complains.     Plan of Care:    Acute kidney injury likely hemodynamically mediated, c.diff colitis  Kidney transplant  --urine sodium low implicating renal hypoperfusion   --avoid NSAIDS, Sales II inhibitors, and other non-essential nephrotoxic agents  --renal dose medication for crcl 10-50  --keep map above 55  --treat c.diff, appreciate ID input  --continue IS for kidney transplant.    Nongap metabolic acidosis likely combination of GI losses and renal tubular defect (urine pH inappropriately high)  --urine anion gap not accurate due to low urine sodium     PTLD lymphoma, s/p " renal transplant  Anemia  --tacrolimus level therapeutic  --appreciate Heme input    Hyponatremia  Hypokalemia  Acidosis  --added KCL and bicarb, needs better oral intake, agree with clinimix.  --avoid hypotonic iv piggy backs  --no ssri antidepressants or thiazide diuretics    Thank you for allowing us to participate in this patient's care. We will continue to follow.    Vital Signs:  Temp Readings from Last 3 Encounters:   10/29/20 98.2 °F (36.8 °C)   10/10/20 98.3 °F (36.8 °C) (Oral)   10/09/20 98.6 °F (37 °C)       Pulse Readings from Last 3 Encounters:   10/29/20 80   10/10/20 95   10/09/20 (!) 56       BP Readings from Last 3 Encounters:   10/29/20 95/70   10/10/20 102/63   10/09/20 115/76       Weight:  Wt Readings from Last 3 Encounters:   10/29/20 80.1 kg (176 lb 9.4 oz)   10/10/20 68.5 kg (151 lb)   10/09/20 66.4 kg (146 lb 6.2 oz)       Past Medical & Surgical History:  Past Medical History:   Diagnosis Date    Acidosis     Adrenal adenoma     Anemia associated with chronic renal failure     Arrhythmia, onset 2015    Awaiting organ transplant status 2013    Basal cell carcinoma 2012    left nasal tip    Blood type B+ 2013    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  Renal allograft biopsy 17 ([facility]): 21 glomeruli, none globally sclerosed, <5% interstitial fibrosis, no ACR, c4d negative, AVR CCT Type 2 (V1 lesion); plan THYMO     Dissecting aortic aneurysm (any part), abdominal     Diverticulosis     Encounter for blood transfusion     ESRD (end stage renal disease) 2010    Febrile neutropenia 10/15/2020    H/O urethral stricture 2018    H/O: urethral stricture     History of AAA (abdominal  aortic aneurysm) repair     History of urethral stricture 12/19/2018    Hypertension     Hypokalemia     Hypothyroidism 1/10/2014    Inguinal hernia bilateral, non-recurrent     Kidney stones     Organ transplant candidate 11/26/2013    Plantar warts 1/10/2014    Recurrent UTI 7/28/2017    S/P kidney transplant     Secondary hyperparathyroidism, renal     Sepsis 10/24/2020    Thyroid disease        Past Surgical History:   Procedure Laterality Date    ABDOMINAL SURGERY      exploratory lapatomy x 2    ABLATION N/A 8/22/2019    Procedure: ABLATION, SVT;  Surgeon: Emerson Mcmanus MD;  Location: [facility] EP LAB;  Service: Cardiology;  Laterality: N/A;  SVT, RFA, CARTO, anes, GP, 323    AORTA - SUPERIOR MESENTERIC ARTERY BYPASS GRAFT      BLADDER NECK RECONSTRUCTION      BLADDER SURGERY      COLONOSCOPY  10/10/2013    Dr. Gutierrez, repeat in 5 years    CYSTOSCOPY      CYSTOSCOPY N/A 12/19/2018    Procedure: CYSTOSCOPY;  Surgeon: Dewey Mann MD;  Location: [address];  Service: Urology;  Laterality: N/A;  45 min    CYSTOURETHROSCOPY WITH DIRECT VISION INTERNAL URETHROTOMY N/A 12/19/2018    Procedure: CYSTOSCOPY, WITH DIRECT VISION INTERNAL URETHROTOMY;  Surgeon: Dewey Mann MD;  Location: [address];  Service: Urology;  Laterality: N/A;    DILATION OF URETHRA N/A 12/19/2018    Procedure: DILATION, URETHRA;  Surgeon: Dewey Mann MD;  [facility];  Service: Urology;  Laterality: N/A;    EXCISIONAL BIOPSY N/A 7/13/2020    Procedure: EXCISIONAL BIOPSY- LEFT INGUINAL NODE;  Surgeon: Vlad Epstein MD;  Location: [facility] OR [address] [place] [address];  Service: General;  Laterality: N/A;    FLEXIBLE CYSTOSCOPY N/A 11/6/2019    Procedure: CYSTOSCOPY, FLEXIBLE;  Surgeon: Dewey Mann MD;  [facility];  Service: Urology;  Laterality: N/A;    GASTROJEJUNOSTOMY      HEMORRHOID SURGERY      HERNIA REPAIR      INSERTION OF VENOUS ACCESS PORT Left 7/27/2020    Procedure:  INSERTION, VENOUS ACCESS PORT;  Surgeon: Vlad Epstein MD;  Location: [address];  Service: General;  Laterality: Left;    KIDNEY TRANSPLANT      LEFT HEART CATHETERIZATION Left 8/20/2019    Procedure: Left heart cath;  Surgeon: Javan Oscar MD;  Location: [facility] CATH/EP LAB;  Service: Cardiology;  Laterality: Left;    LITHOTRIPSY      LYMPH NODE BIOPSY N/A 6/30/2020    Procedure: BIOPSY, LYMPH NODE;  [facility];  Location: [address];  Service: General;  Laterality: N/A;  189 lymph node biopsy /ULTRASOUND    PERCUTANEOUS NEPHROLITHOTRIPSY      right  ESWL  10/31/12    right ESWL  6/26/12    URETHROPLASTY USING PATCH GRAFT N/A 11/6/2019    Procedure: URETHROPLASTY, USING PATCH GRAFT BUCCAL MUCOSA GRAFT;  Surgeon: Dewey Mann MD;  Location: [address];  Service: Urology;  Laterality: N/A;  3 HOURS       Past Social History:  Social History     Socioeconomic History    Marital status: Single     Spouse name: Not on file    Number of children: Not on file    Years of education: Not on file    Highest education level: Not on file   Occupational History     Employer: Disabled   Social Needs    Financial resource strain: Not on file    Food insecurity     Worry: Not on file     Inability: Not on file    Transportation needs     Medical: Not on file     Non-medical: Not on file   Tobacco Use    Smoking status: Former Smoker     Packs/day: 0.50     Years: 40.00     Pack years: 20.00     Types: Cigarettes     Quit date: 6/16/2010     Years since quitting: 10.3    Smokeless tobacco: Never Used   Substance and Sexual Activity    Alcohol use: Yes     Alcohol/week: 3.0 standard drinks     Types: 3 Cans of beer per week     Comment: occasional/social    Drug use: No     Comment: THC in youth    Sexual activity: Yes     Partners: Female     Birth control/protection: None   Lifestyle    Physical activity     Days per week: Not on file     Minutes per  session: Not on file    Stress: Not on file   Relationships    Social connections     Talks on phone: Not on file     Gets together: Not on file     Attends Rastafarian service: Not on file     Active member of club or organization: Not on file     Attends meetings of clubs or organizations: Not on file     Relationship status: Not on file   Other Topics Concern    Not on file   Social History Narrative    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      Current Outpatient Medications on File Prior to Encounter   Medication Sig Dispense Refill    allopurinoL (ZYLOPRIM) 300 MG tablet Take 1 tablet (300 mg total) by mouth once daily. 30 tablet 2    aspirin (ECOTRIN) 81 MG EC tablet Take 1 tablet (81 mg total) by mouth once daily.  0    calcitRIOL (ROCALTROL) 0.5 MCG Cap Take 1 capsule (0.5 mcg total) by mouth once daily. 30 capsule 11    cefpodoxime (VANTIN) 100 MG tablet Take 1 tablet (100 mg total) by mouth every 12 (twelve) hours. 60 tablet 3    COMBIGAN 0.2-0.5 % Drop Place 1 drop into both eyes 2 (two) times a day.       famotidine (PEPCID) 20 MG tablet TAKE 1 TABLET EVERY EVENING (Patient taking differently: Take 20 mg by mouth every evening. ) 90 tablet 3    ketoconazole (NIZORAL) 200 mg Tab TAKE ONE-HALF (1/2) TABLET ONCE DAILY (Patient taking differently: Take 100 mg by mouth once daily. ) 45 tablet 3    levothyroxine (SYNTHROID) 100 MCG tablet TAKE 1 TABLET DAILY (Patient taking differently: Take 100 mcg by mouth before breakfast. Administer on an empty stomach at least 30 minutes before food. If receiving tube feeds, HOLD tube feeds for 1 hour before and after levothyroxine administration.) 90 tablet 3    magnesium oxide (MAG-OX) 400 mg (241.3 mg magnesium) tablet Take 1 tablet (400 mg total) by mouth once daily. 90 tablet 3    multivitamin (ONE DAILY MULTIVITAMIN) per tablet Take 1 tablet by mouth  once daily.      ondansetron (ZOFRAN-ODT) 8 MG TbDL Dissolve 1 tablet (8 mg total) by mouth every 12 (twelve) hours as needed. (Patient taking differently: Take 8 mg by mouth every 12 (twelve) hours as needed (nausea). ) 21 tablet 1    predniSONE (DELTASONE) 50 MG Tab Take 2 tablets (100 mg total) by mouth once daily. Take on days 2-5 of your chemotherapy cycles. 8 tablet 0    sodium bicarbonate 650 MG tablet Take 1 tablet (650 mg total) by mouth 2 (two) times daily. 540 tablet 3    tacrolimus (PROGRAF) 1 MG Cap Take 3 capsules (3 mg total) by mouth every morning AND 2 capsules (2 mg total) every evening. Z94.0/Kidney Transplant on 11/26/16. 150 capsule 11    travoprost (TRAVATAN Z) 0.004 % ophthalmic solution Place 1 drop into both eyes every evening.        Scheduled Meds:   allopurinoL  100 mg Oral Daily    aspirin  81 mg Oral Daily    brimonidine-timoloL  1 drop Both Eyes Q12H    calcitRIOL  0.5 mcg Oral Daily    cholestyramine-aspartame  1 packet Oral TID    dicyclomine  10 mg Oral QID    famotidine  20 mg Oral QHS    fat emulsion 20%  250 mL Intravenous Daily    ferrous sulfate  325 mg Oral BID    levothyroxine  100 mcg Oral Before breakfast    metronidazole  500 mg Intravenous Q8H    midodrine  10 mg Oral TID    potassium chloride  20 mEq Oral TID    sodium bicarbonate  650 mg Oral TID    tacrolimus  2 mg Oral Daily PM    tacrolimus  3 mg Oral Daily AM    travoprost  1 drop Both Eyes QHS    vancomycin  125 mg Oral Q6H     Continuous Infusions:   Amino acid 4.25% - dextrose 5% (CLINIMIX-E) solution with additives (1L provides 42.5 gm AA, 50 gm CHO (170 kcal/L dextrose), Na 35, K 30, Mg 5, Ca 4.5, Acetate 70, Cl 39, Phos 15) 85 mL/hr at 10/28/20 1229    Amino acid 4.25% - dextrose 5% (CLINIMIX-E) solution with additives (1L provides 42.5 gm AA, 50 gm CHO (170 kcal/L dextrose), Na 35, K 30, Mg 5, Ca 4.5, Acetate 70, Cl 39, Phos 15)       PRN Meds:.acetaminophen, albuterol-ipratropium,  "hyoscyamine, magnesium sulfate IVPB, magnesium sulfate IVPB, metoclopramide HCl, morphine, ondansetron, potassium chloride in water **AND** potassium chloride in water **AND** potassium chloride in water, promethazine, sodium chloride 0.9%    Allergies:  Patient has no known allergies.    Past Family History:  Reviewed; refer to Hospitalist Admission Note    Review of Systems:  Review of Systems - All 14 systems reviewed and negative, except as noted in HPI    Physical Exam:    BP 95/70   Pulse 80   Temp 98.2 °F (36.8 °C)   Resp 18   Ht 5' 11" (1.803 m)   Wt 80.1 kg (176 lb 9.4 oz)   SpO2 97%   BMI 24.63 kg/m²     General Appearance:    Alert, cooperative, no distress, appears stated age   Head:    Normocephalic, without obvious abnormality, atraumatic   Eyes:    PER, conjunctiva/corneas clear, EOM's intact in both eyes        Throat:   Lips, mucosa, and tongue normal; teeth and gums normal   Back:     Symmetric, no curvature, ROM normal, no CVA tenderness   Lungs:     Clear to auscultation bilaterally, respirations unlabored   Chest wall:    No tenderness or deformity   Heart:    Regular rate and rhythm, S1 and S2 normal, no murmur, rub   or gallop   Abdomen:     Tender to palpation   Extremities:   Extremities normal, atraumatic, no cyanosis or edema   Pulses:   2+ and symmetric all extremities   MSK:   No joint or muscle swelling, tenderness or deformity   Skin:   Skin color, texture, turgor normal, no rashes or lesions   Neurologic:   CNII-XII intact, normal strength and sensation       Throughout.  No flap     Results:  Lab Results   Component Value Date     10/29/2020    K 3.2 (L) 10/29/2020     (H) 10/29/2020    CO2 11 (L) 10/29/2020    BUN 39 (H) 10/29/2020    CREATININE 2.0 (H) 10/29/2020    CALCIUM 7.4 (L) 10/29/2020    ANIONGAP 10 10/29/2020    ESTGFRAFRICA 38 (A) 10/29/2020    EGFRNONAA 33 (A) 10/29/2020       Lab Results   Component Value Date    CALCIUM 7.4 (L) 10/29/2020    PHOS 2.8 " 10/21/2020       Recent Labs   Lab 10/29/20  0444   WBC 5.73   RBC 2.88*   HGB 9.3*   HCT 28.5*      MCV 99*   MCH 32.3*   MCHC 32.6          I have personally reviewed pertinent radiological imaging and reports.

## 2020-10-29 NOTE — PROGRESS NOTES
[facility]  Patient Name: Alin Burkett  : 1950  Age: 70 y.o.  Sex: male  MRN: [id number]  Admission Date: 10/15/2020  Hospital Length of Stay: 14 days  Code Status: Full Code   Admitting Provider: Brennan Decker MD  Attending Provider: Julia Summers MD  Primary Care Physician: Carmen Krueger MD  Full Code  Subjective:     Date of Visit: 10/29/2020             Principal Problem: C. difficile colitis    Patient ID: Alin Burkett is a 70 y.o. 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 ED workup showed pt was neutropenic with WBC 0.2 with fever. Pt was cultured and given fluid resuscitation and vancomycin and cefepime. Stool cultures positive for C. Difficile toxin. CT abd/pelvis without contrast revealed severe proctocolitis without megacolon. Pt remains pancytopenic with most recent WBC 0.11 with ANC 0.0 and H/H 9.6/29.7 and platelets 91. Pt seen at bedside with his wife and states he has chronic throbbing abdominal pain made worse by eating and has had chronic diarrhea. He states he is fatigued and has decreased appetite along with fever/chills and night sweats.    10/19/2020: Pt seen at bedside and appears cachectic. He endorses that he is still having diarrhea, but that it has improved and decreased in frequency and consistency has thickened. Pt states abdominal pain has improved, but it still present. He endorses fatigue. Pt denies hemoptysis, hematochezia, melena, hematuria.    10/20/2020: Pt seen at bedside in ICU. He states that he is feeling less lethargic. He states his diarrhea is improving and that he only has abdominal pain to palpation.    10/21/2020: Pt seen at bedside in ICU NAD. He states his abdominal pain has decreased. He is still having  diarrhea but with less frequency.     10/22/2020: Pt seen at bedside in ICU. Pt states his abdominal pain has decreased and it no longer hurt to palpate his abdomen. Pt states diarrhea is improving.     10/23/2020: Pt seen at bedside in ICU. Pt states he no longer has abdominal pain and that his fatigue is improving and that his stool is thickening in consistency.    10/26/2020: Pt seen at bedside. He states abdominal pain has been intermittent and that he has had worsening of diarrhea over the last 3 days. No other complaints at this time.     10/28/2020: Pt seen at bedside NAD. He states his abdominal pain has decreased. No new complaints at this time.     10/29/2020: Pt seen at bedside NAD. Fatigue baseline. Abdominal pain is improving. Pt states that he is still having diarrhea. Pt has had blood-tinged stool.    Review of Systems   Constitutional: Positive for activity change and fatigue. Negative for chills and fever.   HENT: Negative for mouth sores and trouble swallowing.    Eyes: Negative for photophobia and visual disturbance.   Respiratory: Negative for cough, chest tightness, shortness of breath, wheezing and stridor.    Cardiovascular: Negative for chest pain and leg swelling.   Gastrointestinal: Positive for abdominal distention and abdominal pain. Negative for constipation, nausea and vomiting.   Musculoskeletal: Negative for arthralgias, back pain and myalgias.   Skin: Negative for color change, pallor, rash and wound.   Neurological: Negative for syncope, speech difficulty and weakness.   Hematological: Negative for adenopathy. Does not bruise/bleed easily.   Psychiatric/Behavioral: Negative for agitation, behavioral problems, confusion, decreased concentration and dysphoric mood.        ONCOLOGY HISTORY:     1. Monomorphic post-transplant lymphoproliferative disorder              A. 2/2020: Noticed left inguinal lymphadenopathy after a dog bite (failed to resolve with antibiotics)              B.  2020: Saw Dr. Collins in infectious diseases for inguinal lymphadenopathy - referred for biopsy              C. 2020: Core biopsy of L inguinal lymph node shows B-cell lymphoma of germinal center origin; EBV-positive by LONNIE; morphology nondiagnostic of PTLD vs follicular lymphoma              D. 2020: PET/CT shows left internal iliac chain node measuring 3.3 x 3 cm with SUV max 37; L inguinal node measures 3.2 x 2.4 cm with SUV max 36              E. 2020: Excisional biopsy of left inguinal node shows monomorphic post-transplant lymphoproliferative disorder (DLBCL, GCB 60%, follicular lymphoma, grade 3B 40%); FISH for MYC rearrangement is negative              F. 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  Renal allograft biopsy 17 (DIVINE): 21 glomeruli, none globally sclerosed, <5% interstitial fibrosis, no ACR, c4d negative, AVR CCT Type 2 (V1 lesion); plan THYMO     Dissecting aortic aneurysm (any part), abdominal     Diverticulosis     Encounter for blood transfusion     ESRD (end stage renal disease) 2010    Febrile neutropenia 10/15/2020    H/O urethral stricture 2018    H/O: urethral stricture     History of AAA (abdominal aortic aneurysm) repair     History of urethral stricture 2018    Hypertension     Hypokalemia      Hypothyroidism 1/10/2014    Inguinal hernia bilateral, non-recurrent     Kidney stones     Organ transplant candidate 11/26/2013    Plantar warts 1/10/2014    Recurrent UTI 7/28/2017    S/P kidney transplant     Secondary hyperparathyroidism, renal     Sepsis 10/24/2020    Thyroid disease        Family History   Problem Relation Age of Onset    Diabetes Mother     Alzheimer's disease Mother     Alcohol abuse Father     HIV Brother     Stroke Maternal Aunt     Kidney disease Paternal Uncle     Kidney disease Cousin     No Known Problems Sister     No Known Problems Daughter     No Known Problems Sister     No Known Problems Brother     No Known Problems Brother     Cancer Brother         thyroid cancer    Colon cancer Brother     Melanoma Neg Hx     Psoriasis Neg Hx     Lupus Neg Hx     Eczema Neg Hx     Colon polyps Neg Hx     Crohn's disease Neg Hx     Ulcerative colitis Neg Hx     Celiac disease Neg Hx        Past Surgical History:   Procedure Laterality Date    ABDOMINAL SURGERY      exploratory lapatomy x 2    ABLATION N/A 8/22/2019    Procedure: ABLATION, SVT;  Surgeon: Emerson Mcmanus MD;  Location: [facility] EP LAB;  Service: Cardiology;  Laterality: N/A;  SVT, RFA, CARTO, anes, GP, 323    AORTA - SUPERIOR MESENTERIC ARTERY BYPASS GRAFT      BLADDER NECK RECONSTRUCTION      BLADDER SURGERY      COLONOSCOPY  10/10/2013    Dr. Gutierrez, repeat in 5 years    CYSTOSCOPY      CYSTOSCOPY N/A 12/19/2018    Procedure: CYSTOSCOPY;  Surgeon: Dewey Mann MD;  Location: [address];  Service: Urology;  Laterality: N/A;  45 min    CYSTOURETHROSCOPY WITH DIRECT VISION INTERNAL URETHROTOMY N/A 12/19/2018    Procedure: CYSTOSCOPY, WITH DIRECT VISION INTERNAL URETHROTOMY;  Surgeon: Dewey Mann MD;  Location: [address];  Service: Urology;  Laterality: N/A;    DILATION OF URETHRA N/A 12/19/2018    Procedure: DILATION, URETHRA;  Surgeon: Dewey Mann MD;  Location: [address]  [facility];  Service: Urology;  Laterality: N/A;    EXCISIONAL BIOPSY N/A 7/13/2020    Procedure: EXCISIONAL BIOPSY- LEFT INGUINAL NODE;  Surgeon: Vlad Epstein MD;  [facility];  Service: General;  Laterality: N/A;    FLEXIBLE CYSTOSCOPY N/A 11/6/2019    Procedure: CYSTOSCOPY, FLEXIBLE;  Surgeon: Dewey Mann MD;  [facility];  Service: Urology;  Laterality: N/A;    GASTROJEJUNOSTOMY      HEMORRHOID SURGERY      HERNIA REPAIR      INSERTION OF VENOUS ACCESS PORT Left 7/27/2020    Procedure: INSERTION, VENOUS ACCESS PORT;  Surgeon: Vlad Epstein MD;  [facility];  Service: General;  Laterality: Left;    KIDNEY TRANSPLANT      LEFT HEART CATHETERIZATION Left 8/20/2019    Procedure: Left heart cath;  Surgeon: Javan Oscar MD;  Location: [facility] CATH[facility]EP LAB;  Service: Cardiology;  Laterality: Left;    LITHOTRIPSY      LYMPH NODE BIOPSY N/A 6/30/2020    Procedure: BIOPSY, LYMPH NODE;  Surgeon: [facility] Provider;  [facility];  Service: General;  Laterality: N/A;  189 lymph node biopsy /ULTRASOUND    PERCUTANEOUS NEPHROLITHOTRIPSY      right  ESWL  10/31/12    right ESWL  6/26/12    URETHROPLASTY USING PATCH GRAFT N/A 11/6/2019    Procedure: URETHROPLASTY, USING PATCH GRAFT BUCCAL MUCOSA GRAFT;  Surgeon: Dewey Mann MD;  [address];  Service: Urology;  Laterality: N/A;  3 HOURS       Social History     Socioeconomic History    Marital status: Single     Spouse name: Not on file    Number of children: Not on file    Years of education: Not on file    Highest education level: Not on file   Occupational History     Employer: Disabled   Social Needs    Financial resource strain: Not on file    Food insecurity     Worry: Not on file     Inability: Not on file    Transportation needs     Medical: Not on file     Non-medical: Not on file   Tobacco Use    Smoking status: Former Smoker     Packs/day: 0.50      Years: 40.00     Pack years: 20.00     Types: Cigarettes     Quit date: 6/16/2010     Years since quitting: 10.3    Smokeless tobacco: Never Used   Substance and Sexual Activity    Alcohol use: Yes     Alcohol/week: 3.0 standard drinks     Types: 3 Cans of beer per week     Comment: occasional/social    Drug use: No     Comment: THC in youth    Sexual activity: Yes     Partners: Female     Birth control/protection: None   Lifestyle    Physical activity     Days per week: Not on file     Minutes per session: Not on file    Stress: Not on file   Relationships    Social connections     Talks on phone: Not on file     Gets together: Not on file     Attends Zoroastrian service: Not on file     Active member of club or organization: Not on file     Attends meetings of clubs or organizations: Not on file     Relationship status: Not on file   Other Topics Concern    Not on file   Social History Narrative    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       Current Facility-Administered Medications   Medication Dose Route Frequency Provider Last Rate Last Dose    acetaminophen tablet 650 mg  650 mg Oral Q4H PRN Julia Summers MD   650 mg at 10/28/20 1238    albuterol-ipratropium 2.5 mg-0.5 mg/3 mL nebulizer solution 3 mL  3 mL Nebulization Q6H PRN Julia Summers MD   3 mL at 10/20/20 1602    allopurinoL tablet 100 mg  100 mg Oral Daily Julia Summers MD   100 mg at 10/29/20 0855    Amino acid 4.25% - dextrose 5% (CLINIMIX-E) solution with additives (1L provides 42.5 gm AA, 50 gm CHO (170 kcal/L dextrose), Na 35, K 30, Mg 5, Ca 4.5, Acetate 70, Cl 39, Phos 15)   Intravenous Continuous Julia Summers MD 85 mL/hr at 10/29/20 1101      aspirin EC tablet 81 mg  81 mg Oral Daily Julia Summers MD   81 mg at 10/29/20 0855    brimonidine-timoloL 0.2-0.5 % ophthalmic solution 1 drop  1 drop Both Eyes Q12H Julia Summers MD   1 drop at 10/29/20 0856    calcitRIOL capsule 0.5 mcg  0.5  mcg Oral Daily Julia Summers MD   0.5 mcg at 10/29/20 0854    cholestyramine-aspartame 4 gram packet 4 g  1 packet Oral TID Lola Tolbert MD   4 g at 10/29/20 0855    dicyclomine capsule 10 mg  10 mg Oral QID PRN Lola Tolbert MD        famotidine tablet 20 mg  20 mg Oral QHS Julia Summers MD   20 mg at 10/28/20 2025    fat emulsion 20% infusion 250 mL  250 mL Intravenous Daily Julia Summers MD        ferrous sulfate EC tablet 325 mg  325 mg Oral BID Julia Summers MD   325 mg at 10/29/20 0855    hyoscyamine ODT 0.125 mg  0.125 mg Sublingual Q4H PRN Julia Summers MD   0.125 mg at 10/17/20 1604    levothyroxine tablet 100 mcg  100 mcg Oral Before breakfast Julia Summers MD   100 mcg at 10/29/20 0512    magnesium sulfate 2g in water 50mL IVPB (premix)  2 g Intravenous PRN Julia Summers MD        magnesium sulfate 2g in water 50mL IVPB (premix)  4 g Intravenous PRN Julia Summers MD        metoclopramide HCl injection 10 mg  10 mg Intravenous Q6H PRN Julia Summers MD        metronidazole IVPB 500 mg  500 mg Intravenous Q8H Julia Summers  mL/hr at 10/29/20 1059 500 mg at 10/29/20 1059    midodrine tablet 10 mg  10 mg Oral TID Julia Summers MD   10 mg at 10/29/20 0855    morphine injection 2 mg  2 mg Intravenous Q4H PRN Julia Summers MD   2 mg at 10/20/20 2213    ondansetron disintegrating tablet 8 mg  8 mg Oral Q6H PRN Julia Summers MD        potassium chloride 10 mEq in 100 mL IVPB  40 mEq Intravenous PRN Julia Summers  mL/hr at 10/20/20 0729 40 mEq at 10/20/20 0729    And    potassium chloride 10 mEq in 100 mL IVPB  60 mEq Intravenous PRN Julia Summers MD        And    potassium chloride 10 mEq in 100 mL IVPB  80 mEq Intravenous PRN Julia Summers MD        potassium chloride SA CR tablet 20 mEq  20 mEq Oral TID Regan Caldwell MD   20 mEq at 10/29/20 0855    promethazine tablet  25 mg  25 mg Oral Q6H PRN Julia Summers MD        sodium bicarbonate tablet 650 mg  650 mg Oral TID Regan Caldwell MD   650 mg at 10/29/20 0855    sodium chloride 0.9% flush 10 mL  10 mL Intravenous PRN Julia Summers MD        tacrolimus capsule 2 mg  2 mg Oral Daily PM Julia Summers MD   2 mg at 10/28/20 1825    tacrolimus capsule 3 mg  3 mg Oral Daily AM Julia Summers MD   3 mg at 10/29/20 0855    travoprost 0.004 % ophthalmic solution 1 drop  1 drop Both Eyes QHS Julia Summers MD   1 drop at 10/28/20 2028    vancomycin 25 mg/mL oral solution 125 mg  125 mg Oral Q6H Julia Summers MD   125 mg at 10/29/20 1059        allopurinoL  100 mg Oral Daily    aspirin  81 mg Oral Daily    brimonidine-timoloL  1 drop Both Eyes Q12H    calcitRIOL  0.5 mcg Oral Daily    cholestyramine-aspartame  1 packet Oral TID    famotidine  20 mg Oral QHS    fat emulsion 20%  250 mL Intravenous Daily    ferrous sulfate  325 mg Oral BID    levothyroxine  100 mcg Oral Before breakfast    metronidazole  500 mg Intravenous Q8H    midodrine  10 mg Oral TID    potassium chloride  20 mEq Oral TID    sodium bicarbonate  650 mg Oral TID    tacrolimus  2 mg Oral Daily PM    tacrolimus  3 mg Oral Daily AM    travoprost  1 drop Both Eyes QHS    vancomycin  125 mg Oral Q6H        Amino acid 4.25% - dextrose 5% (CLINIMIX-E) solution with additives (1L provides 42.5 gm AA, 50 gm CHO (170 kcal/L dextrose), Na 35, K 30, Mg 5, Ca 4.5, Acetate 70, Cl 39, Phos 15) 85 mL/hr at 10/29/20 1101       acetaminophen, albuterol-ipratropium, dicyclomine, hyoscyamine, magnesium sulfate IVPB, magnesium sulfate IVPB, metoclopramide HCl, morphine, ondansetron, potassium chloride in water **AND** potassium chloride in water **AND** potassium chloride in water, promethazine, sodium chloride 0.9%    Antibiotics (From admission, onward)    Start     Stop Route Frequency Ordered    10/23/20 1800  vancomycin 25  mg/mL oral solution 125 mg  (C. difficile Infection (CDI) Treatment Order Panel)      11/06 1759 Oral Every 6 hours 10/23/20 1623    10/16/20 1900  metronidazole IVPB 500 mg      -- IV Every 8 hours (non-standard times) 10/16/20 1755          Review of patient's allergies indicates:  No Known Allergies  All medications and past history have been reviewed.    Objective:      Vitals:  Patient Vitals for the past 24 hrs:   BP Temp Temp src Pulse Resp SpO2 Weight   10/29/20 0750 -- -- -- -- -- 97 % --   10/29/20 0730 95/70 98.2 °F (36.8 °C) -- 80 18 97 % --   10/29/20 0532 -- -- -- -- -- -- 80.1 kg (176 lb 9.4 oz)   10/29/20 0455 100/60 98.8 °F (37.1 °C) Oral 94 18 97 % --   10/29/20 0055 103/70 96.9 °F (36.1 °C) Oral 78 18 98 % --   10/28/20 1955 -- -- -- -- -- 99 % --   10/28/20 1933 (!) 90/59 98.3 °F (36.8 °C) Oral 79 18 96 % --      Body mass index is 24.63 kg/m².  Body surface area is 2 meters squared.    Last 24 Hours:    Intake/Output Summary (Last 24 hours) at 10/29/2020 1646  Last data filed at 10/29/2020 1059  Gross per 24 hour   Intake 2072.7 ml   Output 2225 ml   Net -152.3 ml     Weight Readings:  Wt Readings from Last 5 Encounters:   10/29/20 80.1 kg (176 lb 9.4 oz)   10/10/20 68.5 kg (151 lb)   10/09/20 66.4 kg (146 lb 6.2 oz)   10/02/20 65.8 kg (145 lb)   09/11/20 68.6 kg (151 lb 2 oz)      Blood Type:  B POS     Physical Exam  Constitutional:       Appearance: He is ill-appearing.   HENT:      Head: Normocephalic and atraumatic.      Right Ear: External ear normal.      Left Ear: External ear normal.      Nose: Nose normal. No congestion or rhinorrhea.   Eyes:      General:         Right eye: No discharge.         Left eye: No discharge.      Extraocular Movements: Extraocular movements intact.      Conjunctiva/sclera: Conjunctivae normal.      Pupils: Pupils are equal, round, and reactive to light.   Neck:      Musculoskeletal: Normal range of motion and neck supple. No neck rigidity.   Cardiovascular:       Rate and Rhythm: Normal rate and regular rhythm.      Heart sounds: Normal heart sounds. No murmur.   Pulmonary:      Effort: Pulmonary effort is normal. No respiratory distress.      Breath sounds: Normal breath sounds. No stridor. No wheezing, rhonchi or rales.   Abdominal:      General: Bowel sounds are normal. There is distension.      Palpations: Abdomen is soft.      Tenderness: There is abdominal tenderness (to palpation in the LLQ). There is no guarding.   Musculoskeletal: Normal range of motion.         General: No deformity.      Right lower leg: No edema.      Left lower leg: No edema.   Lymphadenopathy:      Cervical: No cervical adenopathy.   Skin:     General: Skin is warm and dry.      Coloration: Skin is not pale.      Findings: Bruising (bialteral UE and chest) present. No erythema or rash.   Neurological:      General: No focal deficit present.      Mental Status: He is alert and oriented to person, place, and time. Mental status is at baseline.      Cranial Nerves: No cranial nerve deficit.   Psychiatric:         Mood and Affect: Mood normal.         Behavior: Behavior normal.         Thought Content: Thought content normal.         Judgment: Judgment normal.         Labs:  Recent Labs   Lab 10/27/20  0442 10/28/20  0433 10/29/20  0444   WBC 5.52 5.16 5.73   RBC 2.91* 3.04* 2.88*   HGB 9.3* 9.9* 9.3*   HCT 28.8* 30.2* 28.5*    195 196   MCV 99* 99* 99*     Recent Labs   Lab 10/27/20  0442 10/28/20  0433 10/29/20  0444    137 136   K 3.4* 3.2* 3.2*   * 115* 115*   CO2 12* 12* 11*   BUN 35* 36* 39*   CREATININE 2.3* 2.2* 2.0*   GLU 85 131* 110   CALCIUM 7.3* 7.6* 7.4*       Imaging:  Results for orders placed or performed during the hospital encounter of 10/15/20 (from the past 2160 hour(s))   CT Abdomen Pelvis  Without Contrast    Impression    Findings consistent with severe proctocolitis.    Transplanted kidney right side of the pelvis with mild pelvocaliectasis and  perinephric stranding nonspecific.  No calcified stones seen    Additional findings as detailed above including cystic lesion with in the native right kidney versus dilatation of collecting structure of the native right kidney.  Stones in the native right kidney.  Cystic lesion within the pancreas.    Final read    Virtual Radiology concordant      Electronically signed by: Ada Castillo MD  Date:    10/16/2020  Time:    08:56   Results for orders placed or performed during the hospital encounter of 08/06/20 (from the past 2160 hour(s))   CT Chest Abdomen Pelvis Without Contrast (XPD)    Impression    No acute abdominal pathology identified.    Nonvisualization of the left kidney with malrotation of the right kidney and multiple nonobstructing renal stones.  No hydronephrosis.    Right pelvic renal allograft with no evidence for renal allograft focal lesion, nephrolithiasis, or hydronephrosis.    Stable mild ectasia of the infrarenal abdominal aorta measuring up to 2.6 cm without evidence for aortic aneurysm or other acute aortic pathology.  Atherosclerosis identified.    Multiple enlarged lymph nodes in the left inguinal region, with interval dissection of multiple left inguinal and pelvic lymph nodes when compared to nuclear medicine PET-CT 07/08/2020.  Correlate with biopsy results.    Stable cystic appearing lesion in the pancreatic head measuring 1.2 cm.    Other findings as above.    Electronically signed by resident: Mati Cárdenas  Date:    08/06/2020  Time:    09:23    Electronically signed by: Jayson Staples MD  Date:    08/06/2020  Time:    10:34     *Note: Due to a large number of results and/or encounters for the requested time period, some results have not been displayed. A complete set of results can be found in Results Review.     PET CT 07/08/2020  FINDINGS:  Quality of the study: Adequate.     In the neck, there is no abnormal hypermetabolic activity worrisome for malignancy.  Vascular stent  identified in the left axillary region.  No significant lymphadenopathy.     In the chest, there is no abnormal hypermetabolic activity worsened malignancy.  Coronary atherosclerosis.  0.4 cm pulmonary nodule identified in the left upper lobe (axial series 3, image 67), lesion is too small to characterize with PET-CT. Recommend attenuation expected follow-up examinations. No significant lymphadenopathy.     In the abdomen and pelvis, there is hypermetabolic lymphadenopathy throughout the left internal/external iliac chain and left groin.  New left internal iliac chain node measures 3.3 x 3 0 cm with SUV max of 37 (axial fused image 193).  Left inguinal node measures approximately 3.2 x 2.4 cm with SUV max of 36 (axial fused image 218), previously measured up to 1.4 cm.  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  The right kidney appears atrophic with abnormal contour parenchymal calcifications, unchanged.  Right lower quadrant transplant kidney in place.  Stable small bladder diverticulum.  Stable 1.2 cm pancreatic cyst, better characterized on most recent CT with IV contrast.  Stable bilateral probable adrenal adenomas.  Colonic diverticulosis without evidence of acute diverticulitis.  Stable fusiform abdominal aortic ectasia.     Spleen appears upper limit of normal for size measuring 12.0 cm in craniocaudal dimension.  Normal heterogeneous uptake similar to liver.     In the bones, there is no abnormal activity worrisome for malignancy.  Additional focus of increased uptake identified within the myofascial structures of the thigh, just deep to the left femur with SUV max of 27 (axial fused image 269).     Impression:     Hypermetabolic lymphadenopathy throughout the left iliac chain and left groin with additional hypermetabolic focus in the left thigh.  No hypermetabolic juan david disease above the diaphragm.  The Deauville score is 5.    All lab results and imaging results have been reviewed.    Assessment and Plan:       Present on Admission:   (Resolved) Febrile neutropenia   C. difficile colitis   Acquired hypothyroidism   Post-transplant lymphoproliferative disorder (PTLD)   Acute renal failure superimposed on stage 3 chronic kidney disease   Hypokalemia due to excessive gastrointestinal loss of potassium   Anemia due to chemotherapy   Anemia in stage 3 chronic kidney disease   Moderate malnutrition   Metabolic acidosis   (Resolved) Sepsis   Hypotension       Post-transplant lymphoproliferative disorder (PTLD)  -Cycle 4 RCHOP completed on 10/09/2020.  -Please have patient follow up with Primary Oncologist Dr. Luis Fernando Lopez within 72 hours of discharge.      C-difficile colitis  -Pt is on IV Flagyl and oral Vancomycin and being followed by ID.    DIV[facility]  -Followed by Nephrology.      Normocytic Anemia with Thrombocytopenia  -Mixed anemia secondary to iron deficiency and chronic disease. Pt has had some blood in stool secondary to C-diff infection. Anemia remains stable. Continue to monitor.  -Platelets have recovered. Neutropenia resolved s/p granix 300mcg daily x 3 doses with last dose given 10/18/2020.  -Continue ferrous sulfate EC 325mg BID for iron deficiency. Last iron and tibc shows improvement of serum iron to 24 and saturated iron to 15.     Sincerely,  Alexi Camp PA-C    Note is available for collaborating MD; Dr. Cathy Stafford for review.    Electronically signed by: Alexi Camp PA-C

## 2020-10-29 NOTE — PT/OT/SLP PROGRESS
Physical Therapy      Patient Name:  Alin Burkett   MRN:  [id number]    Patient not seen today secondary to Patient fatigue. Will follow-up 10/30/2020.    Chris Megilligan, PT

## 2020-10-29 NOTE — ASSESSMENT & PLAN NOTE
Most likely from hypovolemia.  Improved after receiving 1 L normal saline bolus  Says component of autonomic dysfunction  Will continue midodrine

## 2020-10-29 NOTE — ASSESSMENT & PLAN NOTE
Patient's anemia is currently controlled. Has not received any PRBCs to date.. Etiology likely d/t  anemia of chronic disease  Current CBC reviewed-   Lab Results   Component Value Date    HGB 9.3 (L) 10/29/2020    HCT 28.5 (L) 10/29/2020     Monitor serial CBC and transfuse if patient becomes hemodynamically unstable, symptomatic or H/H drops below 7/21.

## 2020-10-29 NOTE — CARE UPDATE
10/29/20 0750   Patient Assessment/Suction   All Lung Fields Breath Sounds diminished   PRE-TX-O2   O2 Device (Oxygen Therapy) room air   SpO2 97 %   Pulse Oximetry Type Intermittent   $ Pulse Oximetry - Multiple Charge Pulse Oximetry - Multiple   Aerosol Therapy   $ Aerosol Therapy Charges PRN treatment not required

## 2020-10-29 NOTE — ASSESSMENT & PLAN NOTE
Acidosis persistent  Results for XIN TANG SANCHEZ (MRN [id number]) as of 10/24/2020 16:35   Ref. Range 10/24/2020 03:50   CO2 Latest Ref Range: 23 - 29 mmol/L 13 (L)   Anion Gap Latest Ref Range: 8 - 16 mmol/L 10      Was most likely secondary to diarrhea  Nephrology on the case  Will continue sodium bicarb

## 2020-10-30 PROBLEM — K92.2 GI BLEED: Status: ACTIVE | Noted: 2020-01-01

## 2020-10-30 NOTE — PLAN OF CARE
Pt is hypoactive, poor appetite during shift, tolerated oral fluids well, iv nutrition continued, iv access maintained, contact isolation precautions maintained, flex seal in place with no leaking, pt rested throughout shift, will continue to monitor,.

## 2020-10-30 NOTE — TELEPHONE ENCOUNTER
----- Message from Janna John sent at 10/30/2020  3:52 PM CDT -----  Regarding: Pt is hospitalized  Contact: Jayden  Pt wife calling to notify pt was in hospital since 10/15.  He had a fall and has had issues from antibiotic Vantin.  He has had C-diff and is being treated.  [place] to [facility] because this is where his team is.  Please contact her at [phone]

## 2020-10-30 NOTE — PLAN OF CARE
Problem: Occupational Therapy Goal  Goal: Occupational Therapy Goal  Description: Goals to be met by: 11/6/2020    Patient will increase functional independence with ADLs by performing:    UE Dressing with Set-up Assistance.-Danyelle to don gown  LE Dressing with Minimal Assistance.-Max A to don socks.  Grooming while seated with Set-up Assistance.  Toileting from toilet with Minimal Assistance for hygiene and clothing management.   Sitting at edge of bed x 15 minutes with Supervision.  Toilet transfer to toilet with Minimal Assistance.    Outcome: Ongoing, Progressing; pt requesting a shower today and completed with Danyelle during standing in shower and ModA for shower transfer. Pt is motivated. Patient is making progress. Goals remain appropriate. Continue OT plan of care.

## 2020-10-30 NOTE — SUBJECTIVE & OBJECTIVE
Interval History: Patient continues to have diarrhea. No acute events overnight    Review of Systems   Constitutional: Positive for fatigue. Negative for appetite change, chills and fever.   HENT: Negative for congestion, hearing loss, rhinorrhea, sore throat, trouble swallowing and voice change.    Respiratory: Negative for cough, chest tightness, shortness of breath and wheezing.    Cardiovascular: Negative for chest pain, palpitations and leg swelling.   Gastrointestinal: Positive for abdominal pain and diarrhea. Negative for blood in stool, nausea and vomiting.   Genitourinary: Negative for difficulty urinating, frequency, hematuria and urgency.   Musculoskeletal: Negative for back pain, joint swelling and neck stiffness.   Skin: Negative for pallor and rash.   Neurological: Negative for tremors, seizures, syncope, speech difficulty, weakness, numbness and headaches.   Hematological: Negative for adenopathy.   Psychiatric/Behavioral: Negative for agitation, behavioral problems, confusion and sleep disturbance.     Objective:     Vital Signs (Most Recent):  Temp: 99.4 °F (37.4 °C) (10/30/20 0414)  Pulse: 100 (10/30/20 0734)  Resp: 16 (10/30/20 0734)  BP: 114/67 (10/30/20 0414)  SpO2: 100 % (10/30/20 0734) Vital Signs (24h Range):  Temp:  [97.3 °F (36.3 °C)-99.4 °F (37.4 °C)] 99.4 °F (37.4 °C)  Pulse:  [] 100  Resp:  [16-20] 16  SpO2:  [96 %-100 %] 100 %  BP: ()/(63-67) 114/67     Weight: 80.1 kg (176 lb 9.4 oz)  Body mass index is 24.63 kg/m².    Intake/Output Summary (Last 24 hours) at 10/30/2020 0812  Last data filed at 10/30/2020 0600  Gross per 24 hour   Intake 2621.71 ml   Output 2875 ml   Net -253.29 ml      Physical Exam  Vitals signs and nursing note reviewed.   Constitutional:       General: He is not in acute distress.     Appearance: He is ill-appearing. He is not diaphoretic.   HENT:      Head: Normocephalic and atraumatic.      Mouth/Throat:      Mouth: Mucous membranes are dry.   Eyes:       General: No scleral icterus.        Right eye: No discharge.         Left eye: No discharge.      Pupils: Pupils are equal, round, and reactive to light.   Neck:      Vascular: No JVD.   Cardiovascular:      Rate and Rhythm: Normal rate and regular rhythm.   Pulmonary:      Effort: Pulmonary effort is normal.      Breath sounds: Normal breath sounds.   Abdominal:      General: Bowel sounds are normal. There is no distension.      Palpations: Abdomen is soft.      Tenderness: There is abdominal tenderness in the left upper quadrant and left lower quadrant. There is no rebound.   Musculoskeletal:      Right lower leg: No edema.      Left lower leg: No edema.   Skin:     General: Skin is warm.      Capillary Refill: Capillary refill takes less than 2 seconds.      Findings: No rash.   Neurological:      General: No focal deficit present.      Mental Status: He is alert.      Cranial Nerves: No cranial nerve deficit.   Psychiatric:         Mood and Affect: Mood normal.         Significant Labs: All pertinent labs within the past 24 hours have been reviewed.    Significant Imaging: I have reviewed all pertinent imaging results/findings within the past 24 hours.

## 2020-10-30 NOTE — PROGRESS NOTES
[facility]  Patient Name: Alin Burkett  : 1950  Age: 70 y.o.  Sex: male  MRN: [id number]  Admission Date: 10/15/2020  Hospital Length of Stay: 15 days  Code Status: Full Code   Admitting Provider: Brennan Decker MD  Attending Provider: Julia Summers MD  Primary Care Physician: Carmen Krueger MD  Full Code  Subjective:     Date of Visit: 10/30/2020             Principal Problem: C. difficile colitis    Patient ID: Alin Burkett is a 70 y.o. 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 ED workup showed pt was neutropenic with WBC 0.2 with fever. Pt was cultured and given fluid resuscitation and vancomycin and cefepime. Stool cultures positive for C. Difficile toxin. CT abd/pelvis without contrast revealed severe proctocolitis without megacolon. Pt remains pancytopenic with most recent WBC 0.11 with ANC 0.0 and H/H 9.6/29.7 and platelets 91. Pt seen at bedside with his wife and states he has chronic throbbing abdominal pain made worse by eating and has had chronic diarrhea. He states he is fatigued and has decreased appetite along with fever/chills and night sweats.    10/19/2020: Pt seen at bedside and appears cachectic. He endorses that he is still having diarrhea, but that it has improved and decreased in frequency and consistency has thickened. Pt states abdominal pain has improved, but it still present. He endorses fatigue. Pt denies hemoptysis, hematochezia, melena, hematuria.    10/20/2020: Pt seen at bedside in ICU. He states that he is feeling less lethargic. He states his diarrhea is improving and that he only has abdominal pain to palpation.    10/21/2020: Pt seen at bedside in ICU NAD. He states his abdominal pain has decreased. He is still having  diarrhea but with less frequency.     10/22/2020: Pt seen at bedside in ICU. Pt states his abdominal pain has decreased and it no longer hurt to palpate his abdomen. Pt states diarrhea is improving.     10/23/2020: Pt seen at bedside in ICU. Pt states he no longer has abdominal pain and that his fatigue is improving and that his stool is thickening in consistency.    10/26/2020: Pt seen at bedside. He states abdominal pain has been intermittent and that he has had worsening of diarrhea over the last 3 days. No other complaints at this time.     10/28/2020: Pt seen at bedside NAD. He states his abdominal pain has decreased. No new complaints at this time.     10/29/2020: Pt seen at bedside NAD. Fatigue baseline. Abdominal pain is improving. Pt states that he is still having diarrhea. Pt has had blood-tinged stool.    10/30/2020: Pt seen at bedside with wife. He states he has had an increase in fatigue and abdominal pain and has had bloody diarrhea. Pt's wife complains that patient still has sutures in place wounds on bilateral forearms.     Review of Systems   Constitutional: Positive for activity change and fatigue. Negative for chills and fever.   HENT: Negative for mouth sores and trouble swallowing.    Eyes: Negative for photophobia and visual disturbance.   Respiratory: Negative for cough, chest tightness, shortness of breath, wheezing and stridor.    Cardiovascular: Negative for chest pain and leg swelling.   Gastrointestinal: Positive for abdominal distention, abdominal pain and diarrhea (bloody). Negative for constipation, nausea and vomiting.   Musculoskeletal: Negative for arthralgias, back pain and myalgias.   Skin: Negative for color change, pallor, rash and wound.   Neurological: Negative for syncope, speech difficulty and weakness.   Hematological: Negative for adenopathy. Does not bruise/bleed easily.   Psychiatric/Behavioral: Negative for agitation, behavioral problems, confusion, decreased  concentration and dysphoric mood.        ONCOLOGY HISTORY:     1. Monomorphic post-transplant lymphoproliferative disorder              A. 2020: Noticed left inguinal lymphadenopathy after a dog bite (failed to resolve with antibiotics)              B. 2020: Saw Dr. Collins in infectious diseases for inguinal lymphadenopathy - referred for biopsy              C. 2020: Core biopsy of L inguinal lymph node shows B-cell lymphoma of germinal center origin; EBV-positive by LONNIE; morphology nondiagnostic of PTLD vs follicular lymphoma              D. 2020: PET/CT shows left internal iliac chain node measuring 3.3 x 3 cm with SUV max 37; L inguinal node measures 3.2 x 2.4 cm with SUV max 36              E. 2020: Excisional biopsy of left inguinal node shows monomorphic post-transplant lymphoproliferative disorder (DLBCL, GCB 60%, follicular lymphoma, grade 3B 40%); FISH for MYC rearrangement is negative              F. 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  Renal allograft biopsy 17 (DIVINE): 21 glomeruli, none globally sclerosed, <5% interstitial fibrosis, no ACR, c4d negative, AVR CCT Type 2 (V1 lesion); plan THYMO     Dissecting aortic aneurysm (any part), abdominal     Diverticulosis     Encounter for blood transfusion     ESRD (end stage renal disease) 2010     Febrile neutropenia 10/15/2020    H/O urethral stricture 11/27/2018    H/O: urethral stricture     History of AAA (abdominal aortic aneurysm) repair     History of urethral stricture 12/19/2018    Hypertension     Hypokalemia     Hypothyroidism 1/10/2014    Inguinal hernia bilateral, non-recurrent     Kidney stones     Organ transplant candidate 11/26/2013    Plantar warts 1/10/2014    Recurrent UTI 7/28/2017    S/P kidney transplant     Secondary hyperparathyroidism, renal     Sepsis 10/24/2020    Thyroid disease        Family History   Problem Relation Age of Onset    Diabetes Mother     Alzheimer's disease Mother     Alcohol abuse Father     HIV Brother     Stroke Maternal Aunt     Kidney disease Paternal Uncle     Kidney disease Cousin     No Known Problems Sister     No Known Problems Daughter     No Known Problems Sister     No Known Problems Brother     No Known Problems Brother     Cancer Brother         thyroid cancer    Colon cancer Brother     Melanoma Neg Hx     Psoriasis Neg Hx     Lupus Neg Hx     Eczema Neg Hx     Colon polyps Neg Hx     Crohn's disease Neg Hx     Ulcerative colitis Neg Hx     Celiac disease Neg Hx        Past Surgical History:   Procedure Laterality Date    ABDOMINAL SURGERY      exploratory lapatomy x 2    ABLATION N/A 8/22/2019    Procedure: ABLATION, SVT;  Surgeon: Emerson Mcmanus MD;  Location: [facility] EP LAB;  Service: Cardiology;  Laterality: N/A;  SVT, RFA, CARTO, anes, GP, 323    AORTA - SUPERIOR MESENTERIC ARTERY BYPASS GRAFT      BLADDER NECK RECONSTRUCTION      BLADDER SURGERY      COLONOSCOPY  10/10/2013    Dr. Guiterrez, repeat in 5 years    CYSTOSCOPY      CYSTOSCOPY N/A 12/19/2018    Procedure: CYSTOSCOPY;  Surgeon: Dewey Mann MD;  Location: [facility] OR [address] [place] [address];  Service: Urology;  Laterality: N/A;  45 min    CYSTOURETHROSCOPY WITH DIRECT VISION INTERNAL URETHROTOMY N/A 12/19/2018    Procedure: CYSTOSCOPY, WITH DIRECT  VISION INTERNAL URETHROTOMY;  Surgeon: Dewey Mann MD;  [facility];  Service: Urology;  Laterality: N/A;    DILATION OF URETHRA N/A 12/19/2018    Procedure: DILATION, URETHRA;  Surgeon: Dewey Mann MD;  [facility];  Service: Urology;  Laterality: N/A;    EXCISIONAL BIOPSY N/A 7/13/2020    Procedure: EXCISIONAL BIOPSY- LEFT INGUINAL NODE;  Surgeon: Vlad Epstein MD;  [address]  Service: General;  Laterality: N/A;    FLEXIBLE CYSTOSCOPY N/A 11/6/2019    Procedure: CYSTOSCOPY, FLEXIBLE;  Surgeon: Dewey Mann MD;  Location: [facility] OR [address] [place] [address];  Service: Urology;  Laterality: N/A;    GASTROJEJUNOSTOMY      HEMORRHOID SURGERY      HERNIA REPAIR      INSERTION OF VENOUS ACCESS PORT Left 7/27/2020    Procedure: INSERTION, VENOUS ACCESS PORT;  Surgeon: Vlad Epstein MD;  Location: [address];  Service: General;  Laterality: Left;    KIDNEY TRANSPLANT      LEFT HEART CATHETERIZATION Left 8/20/2019    Procedure: Left heart cath;  Surgeon: Javan Oscar MD;  Location: [facility] CATH/EP LAB;  Service: Cardiology;  Laterality: Left;    LITHOTRIPSY      LYMPH NODE BIOPSY N/A 6/30/2020    Procedure: BIOPSY, LYMPH NODE;  Surgeon: Ella Diagnostic Provider;  Location: [address];  Service: General;  Laterality: N/A;  189 lymph node biopsy /ULTRASOUND    PERCUTANEOUS NEPHROLITHOTRIPSY      right  ESWL  10/31/12    right ESWL  6/26/12    URETHROPLASTY USING PATCH GRAFT N/A 11/6/2019    Procedure: URETHROPLASTY, USING PATCH GRAFT BUCCAL MUCOSA GRAFT;  Surgeon: Dewey Mann MD;  Location: [address];  Service: Urology;  Laterality: N/A;  3 HOURS       Social History     Socioeconomic History    Marital status: Single     Spouse name: Not on file    Number of children: Not on file    Years of education: Not on file    Highest education level: Not on file   Occupational History     Employer: Disabled   Social Needs    Financial  resource strain: Not on file    Food insecurity     Worry: Not on file     Inability: Not on file    Transportation needs     Medical: Not on file     Non-medical: Not on file   Tobacco Use    Smoking status: Former Smoker     Packs/day: 0.50     Years: 40.00     Pack years: 20.00     Types: Cigarettes     Quit date: 6/16/2010     Years since quitting: 10.3    Smokeless tobacco: Never Used   Substance and Sexual Activity    Alcohol use: Yes     Alcohol/week: 3.0 standard drinks     Types: 3 Cans of beer per week     Comment: occasional/social    Drug use: No     Comment: THC in youth    Sexual activity: Yes     Partners: Female     Birth control/protection: None   Lifestyle    Physical activity     Days per week: Not on file     Minutes per session: Not on file    Stress: Not on file   Relationships    Social connections     Talks on phone: Not on file     Gets together: Not on file     Attends [employer] service: Not on file     Active member of club or organization: Not on file     Attends meetings of clubs or organizations: Not on file     Relationship status: Not on file   Other Topics Concern    Not on file   Social History Narrative    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       Current Facility-Administered Medications   Medication Dose Route Frequency Provider Last Rate Last Dose    acetaminophen tablet 650 mg  650 mg Oral Q4H PRN Julia Summers MD   650 mg at 10/28/20 1238    albuterol-ipratropium 2.5 mg-0.5 mg/3 mL nebulizer solution 3 mL  3 mL Nebulization Q6H PRN Julia Summers MD   3 mL at 10/20/20 1602    allopurinoL tablet 100 mg  100 mg Oral Daily Julia Summers MD   100 mg at 10/30/20 0841    Amino acid 4.25% - dextrose 5% (CLINIMIX-E) solution with additives (1L provides 42.5 gm AA, 50 gm CHO (170 kcal/L dextrose), Na 35, K 30, Mg 5, Ca 4.5, Acetate 70, Cl 39, Phos 15)   Intravenous Continuous Julia Summers MD 85 mL/hr at 10/30/20 1050      aspirin EC  tablet 81 mg  81 mg Oral Daily Julia Summers MD   81 mg at 10/30/20 0840    brimonidine-timoloL 0.2-0.5 % ophthalmic solution 1 drop  1 drop Both Eyes Q12H Julia Summers MD   1 drop at 10/30/20 1054    calcitRIOL capsule 0.5 mcg  0.5 mcg Oral Daily Julia Summers MD   0.5 mcg at 10/30/20 0840    cholestyramine-aspartame 4 gram packet 4 g  1 packet Oral TID Lola Tolbert MD   4 g at 10/30/20 1528    dicyclomine capsule 10 mg  10 mg Oral QID PRN Lola Tolbert MD        fat emulsion 20% infusion 250 mL  250 mL Intravenous Daily Julia Summers MD        ferrous sulfate EC tablet 325 mg  325 mg Oral BID Julia Summers MD   325 mg at 10/30/20 0841    hyoscyamine ODT 0.125 mg  0.125 mg Sublingual Q4H PRN Julia Summers MD   0.125 mg at 10/17/20 1604    levothyroxine tablet 100 mcg  100 mcg Oral Before breakfast Julia Summers MD   100 mcg at 10/30/20 0611    magnesium sulfate 2g in water 50mL IVPB (premix)  2 g Intravenous PRN Julia Summers MD        magnesium sulfate 2g in water 50mL IVPB (premix)  4 g Intravenous PRN Julia Summers MD        metoclopramide HCl injection 10 mg  10 mg Intravenous Q6H PRN Juila Summers MD        metronidazole IVPB 500 mg  500 mg Intravenous Q8H Julia Summers  mL/hr at 10/30/20 1756 500 mg at 10/30/20 1756    midodrine tablet 10 mg  10 mg Oral TID Julia Summers MD   10 mg at 10/30/20 1528    morphine injection 2 mg  2 mg Intravenous Q4H PRN Julia Summers MD   2 mg at 10/20/20 2213    ondansetron disintegrating tablet 8 mg  8 mg Oral Q6H PRN Julia Summers MD        pantoprazole injection 40 mg  40 mg Intravenous BID Soraida Garcia MD        potassium chloride 10 mEq in 100 mL IVPB  40 mEq Intravenous PRN Julia Summers  mL/hr at 10/20/20 0729 40 mEq at 10/20/20 0729    And    potassium chloride 10 mEq in 100 mL IVPB  60 mEq Intravenous PRN Julia Summers MD         And    potassium chloride 10 mEq in 100 mL IVPB  80 mEq Intravenous PRN Julia Summers MD        potassium chloride SA CR tablet 20 mEq  20 mEq Oral TID Regan Caldwell MD   20 mEq at 10/30/20 1528    promethazine tablet 25 mg  25 mg Oral Q6H PRN Julia Summers MD        sodium bicarbonate tablet 650 mg  650 mg Oral TID Regan Caldwell MD   650 mg at 10/30/20 1528    sodium chloride 0.9% flush 10 mL  10 mL Intravenous PRN Julia Summers MD        tacrolimus capsule 2 mg  2 mg Oral Daily PM Julia Summers MD   2 mg at 10/30/20 1756    tacrolimus capsule 3 mg  3 mg Oral Daily AM Julia Summers MD   3 mg at 10/30/20 0840    travoprost 0.004 % ophthalmic solution 1 drop  1 drop Both Eyes QHS Julia Summers MD   1 drop at 10/29/20 2158    vancomycin 25 mg/mL oral solution 500 mg  500 mg Oral Q6H Soraida Garcia MD   500 mg at 10/30/20 1756        allopurinoL  100 mg Oral Daily    aspirin  81 mg Oral Daily    brimonidine-timoloL  1 drop Both Eyes Q12H    calcitRIOL  0.5 mcg Oral Daily    cholestyramine-aspartame  1 packet Oral TID    fat emulsion 20%  250 mL Intravenous Daily    ferrous sulfate  325 mg Oral BID    levothyroxine  100 mcg Oral Before breakfast    metronidazole  500 mg Intravenous Q8H    midodrine  10 mg Oral TID    pantoprazole  40 mg Intravenous BID    potassium chloride  20 mEq Oral TID    sodium bicarbonate  650 mg Oral TID    tacrolimus  2 mg Oral Daily PM    tacrolimus  3 mg Oral Daily AM    travoprost  1 drop Both Eyes QHS    vancomycin  500 mg Oral Q6H        Amino acid 4.25% - dextrose 5% (CLINIMIX-E) solution with additives (1L provides 42.5 gm AA, 50 gm CHO (170 kcal/L dextrose), Na 35, K 30, Mg 5, Ca 4.5, Acetate 70, Cl 39, Phos 15) 85 mL/hr at 10/30/20 1050       acetaminophen, albuterol-ipratropium, dicyclomine, hyoscyamine, magnesium sulfate IVPB, magnesium sulfate IVPB, metoclopramide HCl, morphine, ondansetron, potassium  chloride in water **AND** potassium chloride in water **AND** potassium chloride in water, promethazine, sodium chloride 0.9%    Antibiotics (From admission, onward)    Start     Stop Route Frequency Ordered    10/30/20 1800  vancomycin 25 mg/mL oral solution 500 mg  (C. difficile Infection (CDI) Treatment Order Panel)      11/06 1759 Oral Every 6 hours 10/30/20 1707    10/16/20 1900  metronidazole IVPB 500 mg      -- IV Every 8 hours (non-standard times) 10/16/20 1755          Review of patient's allergies indicates:  No Known Allergies  All medications and past history have been reviewed.    Objective:      Vitals:  Patient Vitals for the past 24 hrs:   BP Temp Temp src Pulse Resp SpO2   10/30/20 1527 112/65 97 °F (36.1 °C) -- 64 18 98 %   10/30/20 1146 95/69 -- -- 109 18 97 %   10/30/20 0845 109/62 96.9 °F (36.1 °C) -- 104 18 97 %   10/30/20 0734 -- -- -- 100 16 100 %   10/30/20 0414 114/67 99.4 °F (37.4 °C) Oral 104 16 100 %   10/30/20 0008 93/63 97.3 °F (36.3 °C) Oral 87 20 96 %   10/29/20 2010 95/67 98.9 °F (37.2 °C) Oral 102 18 97 %   10/29/20 1857 -- -- -- 80 18 97 %      Body mass index is 24.63 kg/m².  Body surface area is 2 meters squared.    Last 24 Hours:    Intake/Output Summary (Last 24 hours) at 10/30/2020 1821  Last data filed at 10/30/2020 1800  Gross per 24 hour   Intake 2278.13 ml   Output 2750 ml   Net -471.87 ml     Weight Readings:  Wt Readings from Last 5 Encounters:   10/29/20 80.1 kg (176 lb 9.4 oz)   10/10/20 68.5 kg (151 lb)   10/09/20 66.4 kg (146 lb 6.2 oz)   10/02/20 65.8 kg (145 lb)   09/11/20 68.6 kg (151 lb 2 oz)      Blood Type:  B POS     Physical Exam  Constitutional:       Appearance: He is ill-appearing.   HENT:      Head: Normocephalic and atraumatic.      Right Ear: External ear normal.      Left Ear: External ear normal.      Nose: Nose normal. No congestion or rhinorrhea.   Eyes:      General:         Right eye: No discharge.         Left eye: No discharge.      Extraocular  Movements: Extraocular movements intact.      Conjunctiva/sclera: Conjunctivae normal.      Pupils: Pupils are equal, round, and reactive to light.   Neck:      Musculoskeletal: Normal range of motion and neck supple. No neck rigidity.   Cardiovascular:      Rate and Rhythm: Normal rate and regular rhythm.      Heart sounds: Normal heart sounds. No murmur.   Pulmonary:      Effort: Pulmonary effort is normal. No respiratory distress.      Breath sounds: Normal breath sounds. No stridor. No wheezing, rhonchi or rales.   Abdominal:      General: Bowel sounds are normal. There is distension.      Palpations: Abdomen is soft.      Tenderness: There is abdominal tenderness (to palpation in the LLQ). There is no guarding.   Musculoskeletal: Normal range of motion.         General: No deformity.      Right lower leg: No edema.      Left lower leg: No edema.   Lymphadenopathy:      Cervical: No cervical adenopathy.   Skin:     General: Skin is warm and dry.      Coloration: Skin is not pale.      Findings: Bruising (bialteral UE and chest) and lesion (wounds on bilateral forearms with sutures in place) present. No erythema or rash.   Neurological:      General: No focal deficit present.      Mental Status: He is alert and oriented to person, place, and time. Mental status is at baseline.      Cranial Nerves: No cranial nerve deficit.   Psychiatric:         Mood and Affect: Mood normal.         Behavior: Behavior normal.         Thought Content: Thought content normal.         Judgment: Judgment normal.         Labs:  Recent Labs   Lab 10/29/20  0444 10/30/20  0448 10/30/20  1733   WBC 5.73 5.29 3.99   RBC 2.88* 2.83* 2.56*   HGB 9.3* 9.2* 8.2*   HCT 28.5* 27.7* 25.7*    202 181   MCV 99* 98 100*     Recent Labs   Lab 10/28/20  0433 10/29/20  0444 10/30/20  0448    136 137   K 3.2* 3.2* 3.3*   * 115* 115*   CO2 12* 11* 12*   BUN 36* 39* 38*   CREATININE 2.2* 2.0* 1.8*   * 110 100   CALCIUM 7.6* 7.4*  7.4*       Imaging:  Results for orders placed or performed during the hospital encounter of 10/15/20 (from the past 2160 hour(s))   CT Abdomen Pelvis  Without Contrast    Impression    Findings consistent with severe proctocolitis.    Transplanted kidney right side of the pelvis with mild pelvocaliectasis and perinephric stranding nonspecific.  No calcified stones seen    Additional findings as detailed above including cystic lesion with in the native right kidney versus dilatation of collecting structure of the native right kidney.  Stones in the native right kidney.  Cystic lesion within the pancreas.    Final read    Virtual Radiology concordant      Electronically signed by: Ada Castillo MD  Date:    10/16/2020  Time:    08:56   Results for orders placed or performed during the hospital encounter of 08/06/20 (from the past 2160 hour(s))   CT Chest Abdomen Pelvis Without Contrast (XPD)    Impression    No acute abdominal pathology identified.    Nonvisualization of the left kidney with malrotation of the right kidney and multiple nonobstructing renal stones.  No hydronephrosis.    Right pelvic renal allograft with no evidence for renal allograft focal lesion, nephrolithiasis, or hydronephrosis.    Stable mild ectasia of the infrarenal abdominal aorta measuring up to 2.6 cm without evidence for aortic aneurysm or other acute aortic pathology.  Atherosclerosis identified.    Multiple enlarged lymph nodes in the left inguinal region, with interval dissection of multiple left inguinal and pelvic lymph nodes when compared to nuclear medicine PET-CT 07/08/2020.  Correlate with biopsy results.    Stable cystic appearing lesion in the pancreatic head measuring 1.2 cm.    Other findings as above.    Electronically signed by resident: Mati Cárdenas  Date:    08/06/2020  Time:    09:23    Electronically signed by: Jayson Staples MD  Date:    08/06/2020  Time:    10:34     *Note: Due to a large number of results and/or  encounters for the requested time period, some results have not been displayed. A complete set of results can be found in Results Review.     PET CT 07/08/2020  FINDINGS:  Quality of the study: Adequate.     In the neck, there is no abnormal hypermetabolic activity worrisome for malignancy.  Vascular stent identified in the left axillary region.  No significant lymphadenopathy.     In the chest, there is no abnormal hypermetabolic activity worsened malignancy.  Coronary atherosclerosis.  0.4 cm pulmonary nodule identified in the left upper lobe (axial series 3, image 67), lesion is too small to characterize with PET-CT. Recommend attenuation expected follow-up examinations. No significant lymphadenopathy.     In the abdomen and pelvis, there is hypermetabolic lymphadenopathy throughout the left internal/external iliac chain and left groin.  New left internal iliac chain node measures 3.3 x 3 0 cm with SUV max of 37 (axial fused image 193).  Left inguinal node measures approximately 3.2 x 2.4 cm with SUV max of 36 (axial fused image 218), previously measured up to 1.4 cm.  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  The right kidney appears atrophic with abnormal contour parenchymal calcifications, unchanged.  Right lower quadrant transplant kidney in place.  Stable small bladder diverticulum.  Stable 1.2 cm pancreatic cyst, better characterized on most recent CT with IV contrast.  Stable bilateral probable adrenal adenomas.  Colonic diverticulosis without evidence of acute diverticulitis.  Stable fusiform abdominal aortic ectasia.     Spleen appears upper limit of normal for size measuring 12.0 cm in craniocaudal dimension.  Normal heterogeneous uptake similar to liver.     In the bones, there is no abnormal activity worrisome for malignancy.  Additional focus of increased uptake identified within the myofascial structures of the thigh, just deep to the left femur with SUV max of 27 (axial fused image  269).     Impression:     Hypermetabolic lymphadenopathy throughout the left iliac chain and left groin with additional hypermetabolic focus in the left thigh.  No hypermetabolic juan david disease above the diaphragm.  The Deauville score is 5.    All lab results and imaging results have been reviewed.    Assessment and Plan:      Present on Admission:   (Resolved) Febrile neutropenia   C. difficile colitis   Acquired hypothyroidism   Post-transplant lymphoproliferative disorder (PTLD)   Acute renal failure superimposed on stage 3 chronic kidney disease   Hypokalemia due to excessive gastrointestinal loss of potassium   Anemia due to chemotherapy   Anemia in stage 3 chronic kidney disease   Moderate malnutrition   Metabolic acidosis   (Resolved) Sepsis   Hypotension       Post-transplant lymphoproliferative disorder (PTLD)  -Cycle 4 RCHOP completed on 10/09/2020.  -Please have patient follow up with Primary Oncologist Dr. Luis Fernando Lopez within 72 hours of discharge.      C-difficile colitis  -Pt is on IV Flagyl and oral Vancomycin and being followed by ID.    GI bleed  -Blood noted in stool.  -Being followed by Dr. Soraida Garcia GI.    DIVINE  -Followed by Nephrology.      Normocytic Anemia with Thrombocytopenia  -Mixed anemia secondary to iron deficiency and chronic disease. Per nursing, pt has had significant amount of blood noted in stool. Continue ferrous sulfate 325mg BID.  -Platelets have recovered. Neutropenia resolved s/p granix 300mcg daily x 3 doses with last dose given 10/18/2020.    Bilateral forearm wounds with sutures in place  -Wound care consulted. Pt has impaired healing and needs wound care. Orders for mupirocin BID to wound site placed.     Sincerely,  Alexi Camp PA-C    Note is available for collaborating MD; Dr. Cathy Stafford for review.    Electronically signed by: Alexi Camp PA-C

## 2020-10-30 NOTE — ASSESSMENT & PLAN NOTE
Patient's anemia is currently controlled. Has not received any PRBCs to date.. Etiology likely d/t  anemia of chronic disease  Current CBC reviewed-   Lab Results   Component Value Date    HGB 9.2 (L) 10/30/2020    HCT 27.7 (L) 10/30/2020     Monitor serial CBC and transfuse if patient becomes hemodynamically unstable, symptomatic or H/H drops below 7/21.

## 2020-10-30 NOTE — ASSESSMENT & PLAN NOTE
Resolved Give supplement and monitor level.    Potassium   Date Value Ref Range Status   10/30/2020 3.3 (L) 3.5 - 5.1 mmol/L Final   10/29/2020 3.2 (L) 3.5 - 5.1 mmol/L Final

## 2020-10-30 NOTE — CONSULTS
[facility]     CC: Diarrhea, blood in stool     HPI 70 y.o. male with multiple medical problems including SVT, s/p renal transplant with CKD, diffuse large B cell lymphoma (complete RCHOP 2-3 weeks ago), who was admitted with severe, persistent, diarrhea due to C.diff 2 weeks ago, initially associated with significant abdominal pain. He had been improving however now reports worsening of diarrhea as well as blood in stool (in rectal tube). He continues to have mild lower abdominal cramping and decreased appetite (on TPN). He denies prior PUD or GI bleeding and his last colonoscopy was in  and was diverticulosis of sigmoid, descending and transverse colon  .    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  Renal allograft biopsy 17 (DIVINE): 21 glomeruli, none globally sclerosed, <5% interstitial fibrosis, no ACR, c4d negative, AVR CCT Type 2 (V1 lesion); plan THYMO     Dissecting aortic aneurysm (any part), abdominal     Diverticulosis     Encounter for blood transfusion     ESRD (end stage renal disease) 2010    Febrile neutropenia 10/15/2020    H/O urethral stricture 2018    H/O: urethral stricture     History of AAA (abdominal aortic aneurysm) repair     History of urethral stricture 2018    Hypertension     Hypokalemia     Hypothyroidism 1/10/2014    Inguinal hernia bilateral, non-recurrent     Kidney stones      Organ transplant candidate 11/26/2013    Plantar warts 1/10/2014    Recurrent UTI 7/28/2017    S/P kidney transplant     Secondary hyperparathyroidism, renal     Sepsis 10/24/2020    Thyroid disease        Past Surgical History:   Procedure Laterality Date    ABDOMINAL SURGERY      exploratory lapatomy x 2    ABLATION N/A 8/22/2019    Procedure: ABLATION, SVT;  Surgeon: Emerson Mcmanus MD;  Location: [facility] EP LAB;  Service: Cardiology;  Laterality: N/A;  SVT, RFA, CARTO, anes, GP, 323    AORTA - SUPERIOR MESENTERIC ARTERY BYPASS GRAFT      BLADDER NECK RECONSTRUCTION      BLADDER SURGERY      COLONOSCOPY  10/10/2013    Dr. Gutierrez, repeat in 5 years    CYSTOSCOPY      CYSTOSCOPY N/A 12/19/2018    Procedure: CYSTOSCOPY;  Surgeon: Dewey Mann MD;  Location: [address];  Service: Urology;  Laterality: N/A;  45 min    CYSTOURETHROSCOPY WITH DIRECT VISION INTERNAL URETHROTOMY N/A 12/19/2018    Procedure: CYSTOSCOPY, WITH DIRECT VISION INTERNAL URETHROTOMY;  Surgeon: Dewey Mann MD;  Location: [address];  Service: Urology;  Laterality: N/A;    DILATION OF URETHRA N/A 12/19/2018    Procedure: DILATION, URETHRA;  Surgeon: Dewey Mann MD;  [facility];  Service: Urology;  Laterality: N/A;    EXCISIONAL BIOPSY N/A 7/13/2020    Procedure: EXCISIONAL BIOPSY- LEFT INGUINAL NODE;  Surgeon: Vlad Epstein MD;  Location: [address];  Service: General;  Laterality: N/A;    FLEXIBLE CYSTOSCOPY N/A 11/6/2019    Procedure: CYSTOSCOPY, FLEXIBLE;  Surgeon: Dewey Mann MD;  Location: [facility] OR [address] [place] [address];  Service: Urology;  Laterality: N/A;    GASTROJEJUNOSTOMY      HEMORRHOID SURGERY      HERNIA REPAIR      INSERTION OF VENOUS ACCESS PORT Left 7/27/2020    Procedure: INSERTION, VENOUS ACCESS PORT;  Surgeon: Vlad Epstein MD;  Location: [facility] OR [address] [place] [address];  Service: General;  Laterality: Left;    KIDNEY TRANSPLANT      LEFT HEART CATHETERIZATION Left 8/20/2019     Procedure: Left heart cath;  Surgeon: Javan Oscar MD;  Location: [facility] CATH/EP LAB;  Service: Cardiology;  Laterality: Left;    LITHOTRIPSY      LYMPH NODE BIOPSY N/A 6/30/2020    Procedure: BIOPSY, LYMPH NODE;  Surgeon: Ella Diagnostic Provider;  [facility];  Service: General;  Laterality: N/A;  189 lymph node biopsy /ULTRASOUND    PERCUTANEOUS NEPHROLITHOTRIPSY      right  ESWL  10/31/12    right ESWL  6/26/12    URETHROPLASTY USING PATCH GRAFT N/A 11/6/2019    Procedure: URETHROPLASTY, USING PATCH GRAFT BUCCAL MUCOSA GRAFT;  Surgeon: Dewey Mann MD;  [facility];  Service: Urology;  Laterality: N/A;  3 HOURS       Social History     Tobacco Use    Smoking status: Former Smoker     Packs/day: 0.50     Years: 40.00     Pack years: 20.00     Types: Cigarettes     Quit date: 6/16/2010     Years since quitting: 10.3    Smokeless tobacco: Never Used   Substance Use Topics    Alcohol use: Yes     Alcohol/week: 3.0 standard drinks     Types: 3 Cans of beer per week     Comment: occasional/social    Drug use: No     Comment: THC in youth       Family History   Problem Relation Age of Onset    Diabetes Mother     Alzheimer's disease Mother     Alcohol abuse Father     HIV Brother     Stroke Maternal Aunt     Kidney disease Paternal Uncle     Kidney disease Cousin     No Known Problems Sister     No Known Problems Daughter     No Known Problems Sister     No Known Problems Brother     No Known Problems Brother     Cancer Brother         thyroid cancer    Colon cancer Brother     Melanoma Neg Hx     Psoriasis Neg Hx     Lupus Neg Hx     Eczema Neg Hx     Colon polyps Neg Hx     Crohn's disease Neg Hx     Ulcerative colitis Neg Hx     Celiac disease Neg Hx        Allergies and Medications reviewed     Review of Systems  General ROS: negative for - chills, fever; + weight loss  Psychological ROS: negative for - hallucination, depression or suicidal  "ideation  Ophthalmic ROS: negative for - blurry vision, photophobia or eye pain  ENT ROS: negative for - epistaxis, sore throat or rhinorrhea  Respiratory ROS: no cough, shortness of breath, or wheezing  Cardiovascular ROS: no chest pain or dyspnea on exertion  Gastrointestinal ROS: + diarrhea, + blood in stool, + lower abdominal pain  Musculoskeletal ROS: positive for - arthralgia, myalgia, weakness  Neurological ROS: no syncope or seizures; general weakness, + tremor  Dermatological ROS: negative for pruritis, rash and jaundice    Physical Examination  /65   Pulse 64   Temp 97 °F (36.1 °C)   Resp 18   Ht 5' 11" (1.803 m)   Wt 80.1 kg (176 lb 9.4 oz)   SpO2 98%   BMI 24.63 kg/m²   General appearance: alert, cooperative, no distress, chronically ill apeparing  HENT: Normocephalic, atraumatic, neck symmetrical, no nasal discharge   Eyes: conjunctivae/corneas clear, PERRL, EOM's intact, sclera anicteric  Lungs: clear to auscultation bilaterally, no dullness to percussion bilaterally, symmetric expansion, breathing unlabored  Heart: regular rate and rhythm without rub; no displacement of the PMI   Abdomen: soft, mild distention, mild ttp across lower abdomen without rebound or guarding, BS active , no masses appreciated   Extremities: extremities symmetric; no clubbing, cyanosis, or edema  Integument: Skin color, texture, turgor normal; no rashes; alopecia, no jaundice  Neurologic: Alert and oriented X 3, diffuse weakness, + resting tremor  Psychiatric: no pressured speech; flat affect; no evidence of impaired cognition, no anxiety/depression     Labs:  Lab Results   Component Value Date    WBC 5.29 10/30/2020    HGB 9.2 (L) 10/30/2020    HCT 27.7 (L) 10/30/2020    MCV 98 10/30/2020     10/30/2020       CMP  Sodium   Date Value Ref Range Status   10/30/2020 137 136 - 145 mmol/L Final     Potassium   Date Value Ref Range Status   10/30/2020 3.3 (L) 3.5 - 5.1 mmol/L Final     Chloride   Date Value Ref " Range Status   10/30/2020 115 (H) 95 - 110 mmol/L Final     CO2   Date Value Ref Range Status   10/30/2020 12 (L) 23 - 29 mmol/L Final     Glucose   Date Value Ref Range Status   10/30/2020 100 70 - 110 mg/dL Final     BUN   Date Value Ref Range Status   10/30/2020 38 (H) 8 - 23 mg/dL Final     Creatinine   Date Value Ref Range Status   10/30/2020 1.8 (H) 0.5 - 1.4 mg/dL Final     Calcium   Date Value Ref Range Status   10/30/2020 7.4 (L) 8.7 - 10.5 mg/dL Final     Total Protein   Date Value Ref Range Status   10/20/2020 4.2 (L) 6.0 - 8.4 g/dL Final     Albumin   Date Value Ref Range Status   10/21/2020 1.4 (L) 3.5 - 5.2 g/dL Final     Total Bilirubin   Date Value Ref Range Status   10/20/2020 0.5 0.1 - 1.0 mg/dL Final     Comment:     For infants and newborns, interpretation of results should be based  on gestational age, weight and in agreement with clinical  observations.  Premature Infant recommended reference ranges:  Up to 24 hours.............<8.0 mg/dL  Up to 48 hours............<12.0 mg/dL  3-5 days..................<15.0 mg/dL  6-29 days.................<15.0 mg/dL       Alkaline Phosphatase   Date Value Ref Range Status   10/20/2020 154 (H) 55 - 135 U/L Final     AST   Date Value Ref Range Status   10/20/2020 21 10 - 40 U/L Final     ALT   Date Value Ref Range Status   10/20/2020 18 10 - 44 U/L Final     Anion Gap   Date Value Ref Range Status   10/30/2020 10 8 - 16 mmol/L Final     eGFR if    Date Value Ref Range Status   10/30/2020 43 (A) >60 mL/min/1.73 m^2 Final     eGFR if non    Date Value Ref Range Status   10/30/2020 38 (A) >60 mL/min/1.73 m^2 Final     Comment:     Calculation used to obtain the estimated glomerular filtration  rate (eGFR) is the CKD-EPI equation.            Imaging:  CT abdomen 10/16 (admission) was independently visualized and reviewed by me and showed Findings consistent with severe proctocolitis.     Transplanted kidney right side of the pelvis  with mild pelvocaliectasis and perinephric stranding nonspecific.  No calcified stones seen     Additional findings as detailed above including cystic lesion with in the native right kidney versus dilatation of collecting structure of the native right kidney.  Stones in the native right kidney.  Cystic lesion within the pancreas.    Assessment:   70 y.o. male with multiple medical problems including SVT, s/p renal transplant with CKD, diffuse large B cell lymphoma (complete RCHOP 2-3 weeks ago), who was admitted with severe C.diff 2 weeks ago, now with persistent diarrhea and blood in his stool. He has baseline hypotension/borderline hypotension which is unchanged and his blood counts have been stable. His worsening diarrhea (rectal tube in place) is ? due to blood in stool of unclear origin, ? Ischemic colitis ? C.diff ? Diverticular bleed    Plan:  -CBC, lactic acid, PT/INR now  -NPO at midnight, should bleeding continue or significant anemia develop consider EGD tomorrow  -PPI BID  -Consider abdominal imaging with CT if does not improve   -Continue treatment for severe C.diff with PO Vancomycin (will increase dosage to 500 mg q6 hours) and IV Flagyl  -Ok to continue Cholestyramine for now    Soraida Garcia MD  [facility]  [address], [address]  [place] [address]  Office: [phone]  Fax: [phone]

## 2020-10-30 NOTE — ASSESSMENT & PLAN NOTE
Source is likely colitis from C.difficile.   Map appears to be above 70.  Will continue oral vancomycin and IV metronidazole  Patient condition improving at this point  As per infectious disease will benefit from LTAC placement and will need vancomycin and metronidazole for 2 weeks      Microbiology Results (last 7 days)     Procedure Component Value Units Date/Time    Urine Culture High Risk [[id number]] Collected: 10/29/20 1744    Order Status: Sent Specimen: Urine, Clean Catch Updated: 10/29/20 2332    Blood culture [[id number]] Collected: 10/28/20 1728    Order Status: Completed Specimen: Blood Updated: 10/29/20 1715     Blood Culture, Routine No Growth to date    Narrative:      DRAW FROM [facility]    Urine culture [[id number]] Collected: 10/28/20 1831    Order Status: No result Specimen: Urine Updated: 10/28/20 9776

## 2020-10-30 NOTE — ASSESSMENT & PLAN NOTE
Source is likely colitis from C.difficile.   Map appears to be above 70.  Will continue oral vancomycin and IV metronidazole  Diarrhea persistent  As per infectious disease will benefit from LTAC placement and will need vancomycin and metronidazole for 2 weeks      Microbiology Results (last 7 days)     Procedure Component Value Units Date/Time    Urine culture [[id number]]  (Abnormal) Collected: 10/28/20 1831    Order Status: Completed Specimen: Urine Updated: 10/30/20 1231     Urine Culture, Routine PSEUDOMONAS AERUGINOSA  > 100,000 cfu/ml  Susceptibility pending      Narrative:      Specimen Source->Urine    Blood culture [[id number]] Collected: 10/28/20 1728    Order Status: Completed Specimen: Blood Updated: 10/30/20 1212     Blood Culture, Routine No Growth to date      No Growth to date    Narrative:      DRAW FROM [facility]    Urine Culture High Risk [[id number]] Collected: 10/29/20 1744    Order Status: Sent Specimen: Urine, Clean Catch Updated: 10/29/20 4995

## 2020-10-30 NOTE — PT/OT/SLP PROGRESS
Occupational Therapy      Patient Name:  Alin Burkett   MRN:  [id number]    Patient not seen today secondary to Patient unwilling to participate(pt is so cold and wishes to stay under his blankets and warm; VERDE adding 3 more blankets and a warm pack of wipes for hands. pt appreciative.). Will follow-up 11/2/20.    FLAVIO Méndez  10/30/2020

## 2020-10-30 NOTE — ASSESSMENT & PLAN NOTE
Appears to be lower GI bleed  Will continue to monitor H&H  H&H at this point stable  Most likely 2nd colitis  Will consult GI

## 2020-10-30 NOTE — TELEPHONE ENCOUNTER
Spoke with patient significant other to discuss how the transfer process works and how the current team can initiate- but cannot be promised. Reviewed patient hospital stay and symptoms. She stated patient is down and isnt up to oral intake/physical therapy. Tried to reach patient directly to offer words of encouragement. Patient did not answer at this time. Left voicemail with return number.

## 2020-10-30 NOTE — PT/OT/SLP PROGRESS
Physical Therapy      Patient Name:  Alin Burkett   MRN:  [id number]    Patient not seen today secondary to Patient unwilling to participate despite mult. attempts. Will follow-up 10/30.    Carlos Leroy, PT

## 2020-10-30 NOTE — ASSESSMENT & PLAN NOTE
Acidosis persistent  Results for XIN TANG SANCHEZ (MRN [id number]) as of 10/24/2020 16:35   Ref. Range 10/24/2020 03:50   CO2 Latest Ref Range: 23 - 29 mmol/L 13 (L)   Anion Gap Latest Ref Range: 8 - 16 mmol/L 10      Was most likely secondary to diarrhea  Nephrology on the case  Will continue sodium bicarb

## 2020-10-30 NOTE — ASSESSMENT & PLAN NOTE
Creatinine stable  Possibly at ATN    BMP reviewed- noted Estimated Creatinine Clearance: 41.3 mL/min (A) (based on SCr of 1.8 mg/dL (H)). according to latest data.   Monitor UOP and serial BMP and adjust therapy as needed. Renally dose meds.  Results for TANG LAUREN (MRN [id number]) as of 10/24/2020 16:35

## 2020-10-30 NOTE — PT/OT/SLP PROGRESS
"Occupational Therapy   Treatment    Name: Alin Burkett  MRN: [id number]  Admitting Diagnosis:  C. difficile colitis       Recommendations:     Discharge Recommendations: nursing facility, skilled, rehabilitation facility  Discharge Equipment Recommendations:  walker, rolling, bedside commode  Barriers to discharge:  Decreased caregiver support    Assessment:     Alin Burkett is a 70 y.o. male with a medical diagnosis of C. difficile colitis.  He presents with edematous B feet, increased motivation to mobilize and specifically requesting a shower. He is overall Danyelle during shower 2* requiring (A) to stand and maintain standing safely. Shower transfer does require ModA 2* 4" lip into shower and inefficient grab bars placement in bathroom/toilet area. Pt slightly SOB after showering and dressing at EOB. SpO2 remained in 90's. Performance deficits affecting function are weakness, impaired endurance, impaired self care skills, impaired functional mobilty, gait instability, impaired balance, decreased coordination, decreased upper extremity function, decreased lower extremity function, edema.     Rehab Prognosis:  Good; patient would benefit from acute skilled OT services to address these deficits and reach maximum level of function.       Plan:     Patient to be seen 5 x/week to address the above listed problems via self-care/home management, therapeutic activities, therapeutic exercises  · Plan of Care Expires: 11/20/20  · Plan of Care Reviewed with: patient    Subjective     Pain/Comfort:  ·      Objective:     Communicated with: Marissa CARR prior to session.  Patient found HOB elevated with bowel management system, telemetry, PICC line upon OT entry to room.    General Precautions: Standard, contact, fall   Orthopedic Precautions:N/A   Braces:       Occupational Performance:     Bed Mobility:    · Patient completed Scooting/Bridging with stand by assistance  · Patient completed Supine to Sit with minimum assistance " "and with BLE (A)  · Patient completed Sit to Supine with minimum assistance and with BLE (A)     Functional Mobility/Transfers:  · Patient completed Sit <> Stand Transfer with contact guard assistance and minimum assistance  with  rolling walker and and more (A) required after fatigueing from shower.   · Patient completed  Shower Transfer Step Transfer technique with moderate assistance with grab bars and VC to navigate 4" shower lip.  · Functional Mobility: FAIR+  Pt with narrow gait, low activity tolerance and BLE weakness- constant CGA while ambulating is necessary.    Activities of Daily Living:  · Bathing: minimum assistance 2* standing (A) while showering. pt is able to reach all areas of body, even his back.  · Upper Body Dressing: minimum assistance to don gown at EOB  · Lower Body Dressing: maximal assistance  to don slippers and socks at EOB      [facility] 6 Click ADL:      Treatment & Education:  -Due to pt's edematous feet and weak hands he is currently unable to get his socks on and requires help.     Patient left HOB elevated to max and eating his lunch with all lines intact, call button in reach, bed alarm on and RNMarissa notifiedEducation:      GOALS:   Multidisciplinary Problems     Occupational Therapy Goals        Problem: Occupational Therapy Goal    Goal Priority Disciplines Outcome Interventions   Occupational Therapy Goal     OT, PT/OT Ongoing, Progressing    Description: Goals to be met by: 11/6/2020    Patient will increase functional independence with ADLs by performing:    UE Dressing with Set-up Assistance.  LE Dressing with Minimal Assistance.  Grooming while seated with Set-up Assistance.  Toileting from toilet with Minimal Assistance for hygiene and clothing management.   Sitting at edge of bed x 15 minutes with Supervision.  Toilet transfer to toilet with Minimal Assistance.                     Time Tracking:     OT Date of Treatment: 10/29/20  OT Start Time: 1300  OT Stop Time: " 1357  OT Total Time (min): 57 min    Billable Minutes:Self Care/Home Management 57 min    AMMON Méndez/LAURA  10/30/2020

## 2020-10-30 NOTE — PROGRESS NOTES
"[facility] Medicine  Progress Note    Patient Name: Alin Burkett  MRN: [id number]  Patient Class: IP- Inpatient   Admission Date: 10/15/2020  Length of Stay: 15 days  Attending Physician: Julia Summers MD  Primary Care Provider: Carmen Krueger MD        Subjective:     Principal Problem:C. difficile colitis        HPI:  Patient has been having diarrhea with "jelly-like" consistency as well as crampiness in the abdomen.  Symptoms began roughly one week ago.  Also has had fever and malaise.  Appetite poor.  Fatigue as well.  He has been receiving chemo for PTLD; most recent cycle of CHOP was end of last week.  He has history of renal transplant four years ago and is currently on twice-a-day  Prograf.  He's had no cough, no unusual headache, no sinus pain, and no burning on urination.  He feels that he's probably dehydrated.    Overview/Hospital Course:  No notes on file    Interval History: Patient continues to have diarrhea. No acute events overnight    Review of Systems   Constitutional: Positive for fatigue. Negative for appetite change, chills and fever.   HENT: Negative for congestion, hearing loss, rhinorrhea, sore throat, trouble swallowing and voice change.    Respiratory: Negative for cough, chest tightness, shortness of breath and wheezing.    Cardiovascular: Negative for chest pain, palpitations and leg swelling.   Gastrointestinal: Positive for abdominal pain and diarrhea. Negative for blood in stool, nausea and vomiting.   Genitourinary: Negative for difficulty urinating, frequency, hematuria and urgency.   Musculoskeletal: Negative for back pain, joint swelling and neck stiffness.   Skin: Negative for pallor and rash.   Neurological: Negative for tremors, seizures, syncope, speech difficulty, weakness, numbness and headaches.   Hematological: Negative for adenopathy.   Psychiatric/Behavioral: Negative for agitation, behavioral problems, confusion and sleep disturbance. "     Objective:     Vital Signs (Most Recent):  Temp: 99.4 °F (37.4 °C) (10/30/20 0414)  Pulse: 100 (10/30/20 0734)  Resp: 16 (10/30/20 0734)  BP: 114/67 (10/30/20 0414)  SpO2: 100 % (10/30/20 0734) Vital Signs (24h Range):  Temp:  [97.3 °F (36.3 °C)-99.4 °F (37.4 °C)] 99.4 °F (37.4 °C)  Pulse:  [] 100  Resp:  [16-20] 16  SpO2:  [96 %-100 %] 100 %  BP: ()/(63-67) 114/67     Weight: 80.1 kg (176 lb 9.4 oz)  Body mass index is 24.63 kg/m².    Intake/Output Summary (Last 24 hours) at 10/30/2020 0812  Last data filed at 10/30/2020 0600  Gross per 24 hour   Intake 2621.71 ml   Output 2875 ml   Net -253.29 ml      Physical Exam  Vitals signs and nursing note reviewed.   Constitutional:       General: He is not in acute distress.     Appearance: He is ill-appearing. He is not diaphoretic.   HENT:      Head: Normocephalic and atraumatic.      Mouth/Throat:      Mouth: Mucous membranes are dry.   Eyes:      General: No scleral icterus.        Right eye: No discharge.         Left eye: No discharge.      Pupils: Pupils are equal, round, and reactive to light.   Neck:      Vascular: No JVD.   Cardiovascular:      Rate and Rhythm: Normal rate and regular rhythm.   Pulmonary:      Effort: Pulmonary effort is normal.      Breath sounds: Normal breath sounds.   Abdominal:      General: Bowel sounds are normal. There is no distension.      Palpations: Abdomen is soft.      Tenderness: There is abdominal tenderness in the left upper quadrant and left lower quadrant. There is no rebound.   Musculoskeletal:      Right lower leg: No edema.      Left lower leg: No edema.   Skin:     General: Skin is warm.      Capillary Refill: Capillary refill takes less than 2 seconds.      Findings: No rash.   Neurological:      General: No focal deficit present.      Mental Status: He is alert.      Cranial Nerves: No cranial nerve deficit.   Psychiatric:         Mood and Affect: Mood normal.         Significant Labs: All pertinent labs  within the past 24 hours have been reviewed.    Significant Imaging: I have reviewed all pertinent imaging results/findings within the past 24 hours.      Assessment/Plan:      * C. difficile colitis  Source is likely colitis from C.difficile.   Map appears to be above 70.  Will continue oral vancomycin and IV metronidazole  Patient condition improving at this point  As per infectious disease will benefit from LTAC placement and will need vancomycin and metronidazole for 2 weeks      Microbiology Results (last 7 days)     Procedure Component Value Units Date/Time    Urine Culture High Risk [[id number]] Collected: 10/29/20 1744    Order Status: Sent Specimen: Urine, Clean Catch Updated: 10/29/20 2332    Blood culture [[id number]] Collected: 10/28/20 1728    Order Status: Completed Specimen: Blood Updated: 10/29/20 1715     Blood Culture, Routine No Growth to date    Narrative:      DRAW FROM PORTACATH    Urine culture [[id number]] Collected: 10/28/20 1831    Order Status: No result Specimen: Urine Updated: 10/28/20 1859            Acute renal failure superimposed on stage 3 chronic kidney disease  Creatinine stable  Possibly at ATN    BMP reviewed- noted Estimated Creatinine Clearance: 41.3 mL/min (A) (based on SCr of 1.8 mg/dL (H)). according to latest data.   Monitor UOP and serial BMP and adjust therapy as needed. Renally dose meds.  Results for TANG LAUREN (MRN [id number]) as of 10/24/2020 16:35        Hypotension  Most likely from hypovolemia.  Improved after receiving 1 L normal saline bolus  Says component of autonomic dysfunction  Will continue midodrine      Iron deficiency  History noted  Will follow Hematology recommendation      Moderate malnutrition  Nutrition consulted. Body mass index is 24.63 kg/m².. Encourage maximal PO intake. Diet supplementation ordered per nutrition approval. Will encourage PO and monitor closely for weight changes.      Anemia in stage 3 chronic kidney disease  Patient's  anemia is currently controlled. Has not received any PRBCs to date.. Etiology likely d/t  anemia of chronic disease  Current CBC reviewed-   Lab Results   Component Value Date    HGB 9.2 (L) 10/30/2020    HCT 27.7 (L) 10/30/2020     Monitor serial CBC and transfuse if patient becomes hemodynamically unstable, symptomatic or H/H drops below 7/21.         Anemia due to chemotherapy  Patient's anemia is currently controlled. Has not received any PRBCs to date.. Etiology likely d/t chemo no also has iron-deficiency anemia, possibly has GI bleed  Continue ferrous sulfate 325mg PO BID  Current CBC reviewed-   Lab Results   Component Value Date    HGB 9.2 (L) 10/30/2020    HCT 27.7 (L) 10/30/2020     Monitor serial CBC and transfuse if patient becomes hemodynamically unstable, symptomatic or H/H drops below 7/21.         Hypokalemia due to excessive gastrointestinal loss of potassium  Resolved Give supplement and monitor level.    Potassium   Date Value Ref Range Status   10/30/2020 3.3 (L) 3.5 - 5.1 mmol/L Final   10/29/2020 3.2 (L) 3.5 - 5.1 mmol/L Final         Post-transplant lymphoproliferative disorder (PTLD)  Has been receiving chemo for this.      -donor kidney transplant  Continue Prograf at usual dose.      Acquired hypothyroidism  Continue levothyroxine.    Metabolic acidosis  Acidosis persistent  Results for TANG LAUREN (MRN [id number]) as of 10/24/2020 16:35   Ref. Range 10/24/2020 03:50   CO2 Latest Ref Range: 23 - 29 mmol/L 13 (L)   Anion Gap Latest Ref Range: 8 - 16 mmol/L 10      Was most likely secondary to diarrhea  Nephrology on the case  Will continue sodium bicarb      VTE Risk Mitigation (From admission, onward)         Ordered     IP VTE LOW RISK PATIENT  Once      10/15/20 2220                Discharge Planning   TRINH: 10/26/2020     Code Status: Full Code   Is the patient medically ready for discharge?: Yes    Reason for patient still in hospital (select all that apply): Patient  trending condition and Treatment  Discharge Plan A: Home with family                  Julia Summers MD  Department of Hospital Medicine   [facility]

## 2020-10-30 NOTE — ASSESSMENT & PLAN NOTE
Creatinine stable improving over the last few days  Possibly at ATN    BMP reviewed- noted Estimated Creatinine Clearance: 41.3 mL/min (A) (based on SCr of 1.8 mg/dL (H)). according to latest data.   Monitor UOP and serial BMP and adjust therapy as needed. Renally dose meds.  Results for TANG LAUREN XIN SANCHEZ (MRN [id number]) as of 10/24/2020 16:35

## 2020-10-30 NOTE — TELEPHONE ENCOUNTER
Spoke with patient and offered words of encouragement. Reviewed setting small goals to increase oral intake and physical movement. Patient understands and appreciates the call.

## 2020-10-30 NOTE — PROGRESS NOTES
" INPATIENT NEPHROLOGY PROGRESS  [facility] NEPHROLOGY    Alin Burkett  10/30/2020    Reason for consultation:  Acute kidney injury     History of Present Illness:  Patient has been having diarrhea with "jelly-like" consistency as well as crampiness in the abdomen.  Symptoms began roughly one week ago.  Also has had fever and malaise.  Appetite poor.  Fatigue as well.  He has been receiving chemo for PTLD; most recent cycle of CHOP was end of last week.  He has history of renal transplant four years ago and is currently on twice-a-day  Prograf.  He's had no cough, no unusual headache, no sinus pain, and no burning on urination.     10/20  Has some diarrhea and abdominal pain.  No nausea, chest pain, sob, new neuro symptoms, new joint pain.  He is very weak.    I told him that he had previously been seen by doctor Shonda and I would call him to see him.  He stated he didn't want to see Dr Merchant and requested that I become his nephrologist.    10/21  Not much change in renal function since yesterday. UOP 1.3L.  Sleeping quietly.  10/23  Sleeping.  1550 urine output.    10/24 VSS, no new complains. Appreciate ID and Heme input.  10/25 VSS, no new complains. sCr is better.  10/26 VSS, no new complains. Labs are acceptable. Lethargic at times.  10/27 VSS, no new complains.  10/28 VSS, no new complains. Needs to eat bettter.  10/29 VSS, no new complains.   10/30 VSS, no new complains. Still having blood-tinged diarrhea, treated for c.diff. On TPN due to very poor oral intake. Dr. Malave will cover after 5pm today and over the weekend.     Plan of Care:    Acute kidney injury 2/2 c.diff colitis  Kidney transplant in place  --urine sodium low implicating renal hypoperfusion   --avoid NSAIDS, Sales II inhibitors, and other non-essential nephrotoxic agents  --renal dose medication for crcl 10-50  --keep map above 55  --treat c.diff, appreciate ID input  --continue IS for kidney transplant.    Nongap metabolic acidosis likely " combination of GI losses and renal tubular defect (urine pH inappropriately high)  --urine anion gap not accurate due to low urine sodium     PTLD lymphoma, s/p renal transplant  Anemia  --tacrolimus level therapeutic  --appreciate Heme input    Hyponatremia  Hypokalemia  Acidosis  --added KCL and bicarb, needs better oral intake, agree with clinimix.  --avoid hypotonic iv piggy backs  --no ssri antidepressants or thiazide diuretics    Thank you for allowing us to participate in this patient's care. We will continue to follow.    Vital Signs:  Temp Readings from Last 3 Encounters:   10/30/20 96.9 °F (36.1 °C)   10/10/20 98.3 °F (36.8 °C) (Oral)   10/09/20 98.6 °F (37 °C)       Pulse Readings from Last 3 Encounters:   10/30/20 109   10/10/20 95   10/09/20 (!) 56       BP Readings from Last 3 Encounters:   10/30/20 95/69   10/10/20 102/63   10/09/20 115/76       Weight:  Wt Readings from Last 3 Encounters:   10/29/20 80.1 kg (176 lb 9.4 oz)   10/10/20 68.5 kg (151 lb)   10/09/20 66.4 kg (146 lb 6.2 oz)       Past Medical & Surgical History:  Past Medical History:   Diagnosis Date    Acidosis     Adrenal adenoma     Anemia associated with chronic renal failure     Arrhythmia, onset 2015    Awaiting organ transplant status 2013    Basal cell carcinoma 2012    left nasal tip    Blood type B+ 2013    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  Renal allograft biopsy 17 (DIVINE): 21 glomeruli, none globally sclerosed, <5% interstitial fibrosis, no ACR, c4d negative, AVR CCT Type 2 (V1 lesion); plan THYMO     Dissecting aortic aneurysm (any part), abdominal     Diverticulosis     Encounter for blood transfusion     ESRD (end  stage renal disease) 06/16/2010    Febrile neutropenia 10/15/2020    H/O urethral stricture 11/27/2018    H/O: urethral stricture     History of AAA (abdominal aortic aneurysm) repair     History of urethral stricture 12/19/2018    Hypertension     Hypokalemia     Hypothyroidism 1/10/2014    Inguinal hernia bilateral, non-recurrent     Kidney stones     Organ transplant candidate 11/26/2013    Plantar warts 1/10/2014    Recurrent UTI 7/28/2017    S/P kidney transplant     Secondary hyperparathyroidism, renal     Sepsis 10/24/2020    Thyroid disease        Past Surgical History:   Procedure Laterality Date    ABDOMINAL SURGERY      exploratory lapatomy x 2    ABLATION N/A 8/22/2019    Procedure: ABLATION, SVT;  Surgeon: Emerson Mcmanus MD;  Location: [facility] EP LAB;  Service: Cardiology;  Laterality: N/A;  SVT, RFA, CARTO, anes, GP, 323    AORTA - SUPERIOR MESENTERIC ARTERY BYPASS GRAFT      BLADDER NECK RECONSTRUCTION      BLADDER SURGERY      COLONOSCOPY  10/10/2013    Dr. Gutierrez, repeat in 5 years    CYSTOSCOPY      CYSTOSCOPY N/A 12/19/2018    Procedure: CYSTOSCOPY;  Surgeon: Dewey Mann MD;  Location: [address];  Service: Urology;  Laterality: N/A;  45 min    CYSTOURETHROSCOPY WITH DIRECT VISION INTERNAL URETHROTOMY N/A 12/19/2018    Procedure: CYSTOSCOPY, WITH DIRECT VISION INTERNAL URETHROTOMY;  Surgeon: Dewey Mann MD;  Location: [address];  Service: Urology;  Laterality: N/A;    DILATION OF URETHRA N/A 12/19/2018    Procedure: DILATION, URETHRA;  Surgeon: Dewey Mann MD;  Location: [facility] OR [address] [place] [address];  Service: Urology;  Laterality: N/A;    EXCISIONAL BIOPSY N/A 7/13/2020    Procedure: EXCISIONAL BIOPSY- LEFT INGUINAL NODE;  Surgeon: Vlad Epstein MD;  Location: [facility] OR [address] [place] [address];  Service: General;  Laterality: N/A;    FLEXIBLE CYSTOSCOPY N/A 11/6/2019    Procedure: CYSTOSCOPY, FLEXIBLE;  Surgeon: Dewey Mann MD;  Location: [facility] OR [address] [place] [address];  Service:  Urology;  Laterality: N/A;    GASTROJEJUNOSTOMY      HEMORRHOID SURGERY      HERNIA REPAIR      INSERTION OF VENOUS ACCESS PORT Left 7/27/2020    Procedure: INSERTION, VENOUS ACCESS PORT;  Surgeon: Vlad Epstein MD;  Location: [facility] OR [address] [place] [address];  Service: General;  Laterality: Left;    KIDNEY TRANSPLANT      LEFT HEART CATHETERIZATION Left 8/20/2019    Procedure: Left heart cath;  Surgeon: Javan Oscar MD;  Location: [facility] CATH/EP LAB;  Service: Cardiology;  Laterality: Left;    LITHOTRIPSY      LYMPH NODE BIOPSY N/A 6/30/2020    Procedure: BIOPSY, LYMPH NODE;  Surgeon: Ella Diagnostic Provider;  Location: [facility] OR [address] [place] [address];  Service: General;  Laterality: N/A;  189 lymph node biopsy /ULTRASOUND    PERCUTANEOUS NEPHROLITHOTRIPSY      right  ESWL  10/31/12    right ESWL  6/26/12    URETHROPLASTY USING PATCH GRAFT N/A 11/6/2019    Procedure: URETHROPLASTY, USING PATCH GRAFT BUCCAL MUCOSA GRAFT;  Surgeon: Dewey Mann MD;  Location: [facility] OR [address] [place] [address];  Service: Urology;  Laterality: N/A;  3 HOURS       Past Social History:  Social History     Socioeconomic History    Marital status: Single     Spouse name: Not on file    Number of children: Not on file    Years of education: Not on file    Highest education level: Not on file   Occupational History     Employer: Disabled   Social Needs    Financial resource strain: Not on file    Food insecurity     Worry: Not on file     Inability: Not on file    Transportation needs     Medical: Not on file     Non-medical: Not on file   Tobacco Use    Smoking status: Former Smoker     Packs/day: 0.50     Years: 40.00     Pack years: 20.00     Types: Cigarettes     Quit date: 6/16/2010     Years since quitting: 10.3    Smokeless tobacco: Never Used   Substance and Sexual Activity    Alcohol use: Yes     Alcohol/week: 3.0 standard drinks     Types: 3 Cans of beer per week     Comment: occasional/social    Drug use: No     Comment: THC in youth     Sexual activity: Yes     Partners: Female     Birth control/protection: None   Lifestyle    Physical activity     Days per week: Not on file     Minutes per session: Not on file    Stress: Not on file   Relationships    Social connections     Talks on phone: Not on file     Gets together: Not on file     Attends Buddhism service: Not on file     Active member of club or organization: Not on file     Attends meetings of clubs or organizations: Not on file     Relationship status: Not on file   Other Topics Concern    Not on file   Social History Narrative    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      Current Outpatient Medications on File Prior to Encounter   Medication Sig Dispense Refill    allopurinoL (ZYLOPRIM) 300 MG tablet Take 1 tablet (300 mg total) by mouth once daily. 30 tablet 2    aspirin (ECOTRIN) 81 MG EC tablet Take 1 tablet (81 mg total) by mouth once daily.  0    calcitRIOL (ROCALTROL) 0.5 MCG Cap Take 1 capsule (0.5 mcg total) by mouth once daily. 30 capsule 11    cefpodoxime (VANTIN) 100 MG tablet Take 1 tablet (100 mg total) by mouth every 12 (twelve) hours. 60 tablet 3    COMBIGAN 0.2-0.5 % Drop Place 1 drop into both eyes 2 (two) times a day.       famotidine (PEPCID) 20 MG tablet TAKE 1 TABLET EVERY EVENING (Patient taking differently: Take 20 mg by mouth every evening. ) 90 tablet 3    ketoconazole (NIZORAL) 200 mg Tab TAKE ONE-HALF (1/2) TABLET ONCE DAILY (Patient taking differently: Take 100 mg by mouth once daily. ) 45 tablet 3    levothyroxine (SYNTHROID) 100 MCG tablet TAKE 1 TABLET DAILY (Patient taking differently: Take 100 mcg by mouth before breakfast. Administer on an empty stomach at least 30 minutes before food. If receiving tube feeds, HOLD tube feeds for 1 hour before and after levothyroxine administration.) 90 tablet 3    magnesium oxide (MAG-OX) 400 mg (241.3 mg  magnesium) tablet Take 1 tablet (400 mg total) by mouth once daily. 90 tablet 3    multivitamin (ONE DAILY MULTIVITAMIN) per tablet Take 1 tablet by mouth once daily.      ondansetron (ZOFRAN-ODT) 8 MG TbDL Dissolve 1 tablet (8 mg total) by mouth every 12 (twelve) hours as needed. (Patient taking differently: Take 8 mg by mouth every 12 (twelve) hours as needed (nausea). ) 21 tablet 1    predniSONE (DELTASONE) 50 MG Tab Take 2 tablets (100 mg total) by mouth once daily. Take on days 2-5 of your chemotherapy cycles. 8 tablet 0    sodium bicarbonate 650 MG tablet Take 1 tablet (650 mg total) by mouth 2 (two) times daily. 540 tablet 3    tacrolimus (PROGRAF) 1 MG Cap Take 3 capsules (3 mg total) by mouth every morning AND 2 capsules (2 mg total) every evening. Z94.0/Kidney Transplant on 11/26/16. 150 capsule 11    travoprost (TRAVATAN Z) 0.004 % ophthalmic solution Place 1 drop into both eyes every evening.        Scheduled Meds:   allopurinoL  100 mg Oral Daily    aspirin  81 mg Oral Daily    brimonidine-timoloL  1 drop Both Eyes Q12H    calcitRIOL  0.5 mcg Oral Daily    cholestyramine-aspartame  1 packet Oral TID    famotidine  20 mg Oral QHS    fat emulsion 20%  250 mL Intravenous Daily    ferrous sulfate  325 mg Oral BID    levothyroxine  100 mcg Oral Before breakfast    metronidazole  500 mg Intravenous Q8H    midodrine  10 mg Oral TID    potassium chloride  20 mEq Oral TID    sodium bicarbonate  650 mg Oral TID    tacrolimus  2 mg Oral Daily PM    tacrolimus  3 mg Oral Daily AM    travoprost  1 drop Both Eyes QHS    vancomycin  125 mg Oral Q6H     Continuous Infusions:   Amino acid 4.25% - dextrose 5% (CLINIMIX-E) solution with additives (1L provides 42.5 gm AA, 50 gm CHO (170 kcal/L dextrose), Na 35, K 30, Mg 5, Ca 4.5, Acetate 70, Cl 39, Phos 15) 85 mL/hr at 10/30/20 1050     PRN Meds:.acetaminophen, albuterol-ipratropium, dicyclomine, hyoscyamine, magnesium sulfate IVPB, magnesium  "sulfate IVPB, metoclopramide HCl, morphine, ondansetron, potassium chloride in water **AND** potassium chloride in water **AND** potassium chloride in water, promethazine, sodium chloride 0.9%    Allergies:  Patient has no known allergies.    Past Family History:  Reviewed; refer to Hospitalist Admission Note    Review of Systems:  Review of Systems - All 14 systems reviewed and negative, except as noted in HPI    Physical Exam:    BP 95/69   Pulse 109   Temp 96.9 °F (36.1 °C)   Resp 18   Ht 5' 11" (1.803 m)   Wt 80.1 kg (176 lb 9.4 oz)   SpO2 97%   BMI 24.63 kg/m²     General Appearance:    Alert, cooperative, no distress, appears stated age   Head:    Normocephalic, without obvious abnormality, atraumatic   Eyes:    PER, conjunctiva/corneas clear, EOM's intact in both eyes        Throat:   Lips, mucosa, and tongue normal; teeth and gums normal   Back:     Symmetric, no curvature, ROM normal, no CVA tenderness   Lungs:     Clear to auscultation bilaterally, respirations unlabored   Chest wall:    No tenderness or deformity   Heart:    Regular rate and rhythm, S1 and S2 normal, no murmur, rub   or gallop   Abdomen:     Tender to palpation   Extremities:   Extremities normal, atraumatic, no cyanosis or edema   Pulses:   2+ and symmetric all extremities   MSK:   No joint or muscle swelling, tenderness or deformity   Skin:   Skin color, texture, turgor normal, no rashes or lesions   Neurologic:   CNII-XII intact, normal strength and sensation       Throughout.  No flap     Results:  Lab Results   Component Value Date     10/30/2020    K 3.3 (L) 10/30/2020     (H) 10/30/2020    CO2 12 (L) 10/30/2020    BUN 38 (H) 10/30/2020    CREATININE 1.8 (H) 10/30/2020    CALCIUM 7.4 (L) 10/30/2020    ANIONGAP 10 10/30/2020    ESTGFRAFRICA 43 (A) 10/30/2020    EGFRNONAA 38 (A) 10/30/2020       Lab Results   Component Value Date    CALCIUM 7.4 (L) 10/30/2020    PHOS 2.8 10/21/2020       Recent Labs   Lab " 10/30/20  0448   WBC 5.29   RBC 2.83*   HGB 9.2*   HCT 27.7*      MCV 98   MCH 32.5*   MCHC 33.2          I have personally reviewed pertinent radiological imaging and reports.

## 2020-10-30 NOTE — NURSING
While changing pt, noted to have lots of bright red, clotty stool in flexiseal as well as leaking around. No s/s of distress noted by patient. Dr. Summers made aware who ordered GI consult.

## 2020-10-30 NOTE — PROGRESS NOTES
"[facility] Medicine  Progress Note    Patient Name: Alin Burkett  MRN: [id number]  Patient Class: IP- Inpatient   Admission Date: 10/15/2020  Length of Stay: 15 days  Attending Physician: Julia Summers MD  Primary Care Provider: Carmen Krueger MD        Subjective:     Principal Problem:C. difficile colitis        HPI:  Patient has been having diarrhea with "jelly-like" consistency as well as crampiness in the abdomen.  Symptoms began roughly one week ago.  Also has had fever and malaise.  Appetite poor.  Fatigue as well.  He has been receiving chemo for PTLD; most recent cycle of CHOP was end of last week.  He has history of renal transplant four years ago and is currently on twice-a-day  Prograf.  He's had no cough, no unusual headache, no sinus pain, and no burning on urination.  He feels that he's probably dehydrated.    Overview/Hospital Course:  No notes on file    Interval History:  Patient had a bloody stool this morning.  Patient continues to have loose stools but states that he is more formed.  Vital stable H&H stable     Review of Systems   Constitutional: Positive for fatigue. Negative for appetite change, chills and fever.   HENT: Negative for congestion, hearing loss, rhinorrhea, sore throat, trouble swallowing and voice change.    Respiratory: Negative for cough, chest tightness, shortness of breath and wheezing.    Cardiovascular: Negative for chest pain, palpitations and leg swelling.   Gastrointestinal: Positive for abdominal pain, blood in stool and diarrhea. Negative for nausea and vomiting.   Genitourinary: Negative for difficulty urinating, frequency, hematuria and urgency.   Musculoskeletal: Negative for back pain, joint swelling and neck stiffness.   Skin: Negative for pallor and rash.   Neurological: Negative for tremors, seizures, syncope, speech difficulty, weakness, numbness and headaches.   Hematological: Negative for adenopathy. "   Psychiatric/Behavioral: Negative for agitation, behavioral problems, confusion and sleep disturbance.     Objective:     Vital Signs (Most Recent):  Temp: 97 °F (36.1 °C) (10/30/20 1527)  Pulse: 64 (10/30/20 1527)  Resp: 18 (10/30/20 1527)  BP: 112/65 (10/30/20 1527)  SpO2: 98 % (10/30/20 1527) Vital Signs (24h Range):  Temp:  [96.9 °F (36.1 °C)-99.4 °F (37.4 °C)] 97 °F (36.1 °C)  Pulse:  [] 64  Resp:  [16-20] 18  SpO2:  [96 %-100 %] 98 %  BP: ()/(62-69) 112/65     Weight: 80.1 kg (176 lb 9.4 oz)  Body mass index is 24.63 kg/m².    Intake/Output Summary (Last 24 hours) at 10/30/2020 1613  Last data filed at 10/30/2020 1500  Gross per 24 hour   Intake 2821.71 ml   Output 2100 ml   Net 721.71 ml      Physical Exam  Vitals signs and nursing note reviewed.   Constitutional:       General: He is not in acute distress.     Appearance: He is ill-appearing. He is not diaphoretic.   HENT:      Head: Normocephalic and atraumatic.      Mouth/Throat:      Mouth: Mucous membranes are dry.   Eyes:      General: No scleral icterus.        Right eye: No discharge.         Left eye: No discharge.      Pupils: Pupils are equal, round, and reactive to light.   Neck:      Vascular: No JVD.   Cardiovascular:      Rate and Rhythm: Normal rate and regular rhythm.   Pulmonary:      Effort: Pulmonary effort is normal.      Breath sounds: Normal breath sounds.   Abdominal:      General: Bowel sounds are normal. There is no distension.      Palpations: Abdomen is soft.      Tenderness: There is abdominal tenderness in the left upper quadrant and left lower quadrant. There is no rebound.   Musculoskeletal:      Right lower leg: No edema.      Left lower leg: No edema.   Skin:     General: Skin is warm.      Capillary Refill: Capillary refill takes less than 2 seconds.      Findings: No rash.   Neurological:      General: No focal deficit present.      Mental Status: He is alert.      Cranial Nerves: No cranial nerve deficit.    Psychiatric:         Mood and Affect: Mood normal.         Significant Labs:   BMP:   Recent Labs   Lab 10/30/20  0448         K 3.3*   *   CO2 12*   BUN 38*   CREATININE 1.8*   CALCIUM 7.4*     CBC:   Recent Labs   Lab 10/29/20  0444 10/30/20  0448   WBC 5.73 5.29   HGB 9.3* 9.2*   HCT 28.5* 27.7*    202       Significant Imaging: I have reviewed all pertinent imaging results/findings within the past 24 hours.      Assessment/Plan:      * C. difficile colitis  Source is likely colitis from C.difficile.   Map appears to be above 70.  Will continue oral vancomycin and IV metronidazole  Diarrhea persistent  As per infectious disease will benefit from LTAC placement and will need vancomycin and metronidazole for 2 weeks      Microbiology Results (last 7 days)     Procedure Component Value Units Date/Time    Urine culture [[id number]]  (Abnormal) Collected: 10/28/20 1831    Order Status: Completed Specimen: Urine Updated: 10/30/20 1231     Urine Culture, Routine PSEUDOMONAS AERUGINOSA  > 100,000 cfu/ml  Susceptibility pending      Narrative:      Specimen Source->Urine    Blood culture [[id number]] Collected: 10/28/20 1728    Order Status: Completed Specimen: Blood Updated: 10/30/20 1212     Blood Culture, Routine No Growth to date      No Growth to date    Narrative:      DRAW FROM [facility]    Urine Culture High Risk [[id number]] Collected: 10/29/20 1744    Order Status: Sent Specimen: Urine, Clean Catch Updated: 10/29/20 2332            Acute renal failure superimposed on stage 3 chronic kidney disease  Creatinine stable improving over the last few days  Possibly at ATN    BMP reviewed- noted Estimated Creatinine Clearance: 41.3 mL/min (A) (based on SCr of 1.8 mg/dL (H)). according to latest data.   Monitor UOP and serial BMP and adjust therapy as needed. Renally dose meds.  Results for TANG LAUREN (MRN [id number]) as of 10/24/2020 16:35        GI bleed  Appears to be lower GI  bleed  Will continue to monitor H&H  H&H at this point stable  Most likely 2nd colitis  Will consult GI      Hypotension  Resolved. Says component of autonomic dysfunction  Will continue midodrine      Iron deficiency  History noted  Will follow Hematology recommendation      Moderate malnutrition  Nutrition consulted. Body mass index is 24.63 kg/m².. Encourage maximal PO intake. Diet supplementation ordered per nutrition approval. Will encourage PO and monitor closely for weight changes.      Anemia in stage 3 chronic kidney disease  Patient's anemia is currently controlled. Has not received any PRBCs to date.. Etiology likely d/t  anemia of chronic disease  Current CBC reviewed-   Lab Results   Component Value Date    HGB 9.2 (L) 10/30/2020    HCT 27.7 (L) 10/30/2020     Monitor serial CBC and transfuse if patient becomes hemodynamically unstable, symptomatic or H/H drops below 7/21.         Anemia due to chemotherapy  Patient's anemia is currently controlled. Has not received any PRBCs to date.. Etiology likely d/t chemo no also has iron-deficiency anemia, possibly has GI bleed  Continue ferrous sulfate 325mg PO BID  Current CBC reviewed-   Lab Results   Component Value Date    HGB 9.2 (L) 10/30/2020    HCT 27.7 (L) 10/30/2020     Monitor serial CBC and transfuse if patient becomes hemodynamically unstable, symptomatic or H/H drops below 7/21.         Hypokalemia due to excessive gastrointestinal loss of potassium  Resolved Give supplement and monitor level.    Potassium   Date Value Ref Range Status   10/30/2020 3.3 (L) 3.5 - 5.1 mmol/L Final   10/29/2020 3.2 (L) 3.5 - 5.1 mmol/L Final         Post-transplant lymphoproliferative disorder (PTLD)  Has been receiving chemo for this.      -donor kidney transplant  Continue Prograf at usual dose.      Acquired hypothyroidism  Continue levothyroxine.    Metabolic acidosis  Acidosis persistent due to persistent diarrhea  Normal gap acidosis  Results for  TANG LAUREN (MRN [id number]) as of 10/24/2020 16:35   Ref. Range 10/24/2020 03:50   CO2 Latest Ref Range: 23 - 29 mmol/L 13 (L)   Anion Gap Latest Ref Range: 8 - 16 mmol/L 10      Was most likely secondary to diarrhea  Nephrology on the case  Will continue sodium bicarb      VTE Risk Mitigation (From admission, onward)         Ordered     IP VTE LOW RISK PATIENT  Once      10/15/20 2220                Discharge Planning   TRINH: 10/26/2020     Code Status: Full Code   Is the patient medically ready for discharge?: Yes    Reason for patient still in hospital (select all that apply): Patient new problem, Patient trending condition and Treatment  Discharge Plan A: Home with family                  Julia Summers MD  Department of Hospital Medicine   [facility]

## 2020-10-30 NOTE — PROGRESS NOTES
Clinical Pharmacy Chart Review Note    Alin Burkett is a 70 y.o. male was rounded on 10/30/2020. The patient was thoroughly reviewed and monitored by the hospitalist and pharmacist to ensure medication for specific disease states and/or problems were properly prescribed, dispensed, administered, and monitored. Identified dosing adjustments were addressed, in addition to assessment of proper dosing based on current renal/hepatic function. Pharmacy will provide medication recommendations whenever necessary to the provider to ensure positive patient outcomes. High risk adverse effects were reviewed will all healthcare providers involved in this patient's care. A medication reconciliation and drug-interaction check has been completed by a pharmacist. Discrepancies will be addressed and resolved on an on-going bases and pharmacy will continue to follow this patient until discharge. Feel free to contact us if you have any questions.    Mony Davis, PharmD  Pharmacy Resident  [phone]

## 2020-10-30 NOTE — ASSESSMENT & PLAN NOTE
Acidosis persistent due to persistent diarrhea  Normal gap acidosis  Results for TANG LAUREN (MRN [id number]) as of 10/24/2020 16:35   Ref. Range 10/24/2020 03:50   CO2 Latest Ref Range: 23 - 29 mmol/L 13 (L)   Anion Gap Latest Ref Range: 8 - 16 mmol/L 10      Was most likely secondary to diarrhea  Nephrology on the case  Will continue sodium bicarb

## 2020-10-30 NOTE — PLAN OF CARE
POC reviewed with patient and patient's family, understanding verbalized. AAOX4. Turned q 2 for bedbound status and limited mobility. Clinimix running @ 85ml/hr. Flexiseal still in place. Telemetry monitoring maintained. VS stable throughout shift. Safety maintained. Will continue to monitor.

## 2020-10-30 NOTE — SUBJECTIVE & OBJECTIVE
Interval History:  Patient had a bloody stool this morning.  Patient continues to have loose stools but states that he is more formed.  Vital stable H&H stable     Review of Systems   Constitutional: Positive for fatigue. Negative for appetite change, chills and fever.   HENT: Negative for congestion, hearing loss, rhinorrhea, sore throat, trouble swallowing and voice change.    Respiratory: Negative for cough, chest tightness, shortness of breath and wheezing.    Cardiovascular: Negative for chest pain, palpitations and leg swelling.   Gastrointestinal: Positive for abdominal pain, blood in stool and diarrhea. Negative for nausea and vomiting.   Genitourinary: Negative for difficulty urinating, frequency, hematuria and urgency.   Musculoskeletal: Negative for back pain, joint swelling and neck stiffness.   Skin: Negative for pallor and rash.   Neurological: Negative for tremors, seizures, syncope, speech difficulty, weakness, numbness and headaches.   Hematological: Negative for adenopathy.   Psychiatric/Behavioral: Negative for agitation, behavioral problems, confusion and sleep disturbance.     Objective:     Vital Signs (Most Recent):  Temp: 97 °F (36.1 °C) (10/30/20 1527)  Pulse: 64 (10/30/20 1527)  Resp: 18 (10/30/20 1527)  BP: 112/65 (10/30/20 1527)  SpO2: 98 % (10/30/20 1527) Vital Signs (24h Range):  Temp:  [96.9 °F (36.1 °C)-99.4 °F (37.4 °C)] 97 °F (36.1 °C)  Pulse:  [] 64  Resp:  [16-20] 18  SpO2:  [96 %-100 %] 98 %  BP: ()/(62-69) 112/65     Weight: 80.1 kg (176 lb 9.4 oz)  Body mass index is 24.63 kg/m².    Intake/Output Summary (Last 24 hours) at 10/30/2020 1613  Last data filed at 10/30/2020 1500  Gross per 24 hour   Intake 2821.71 ml   Output 2100 ml   Net 721.71 ml      Physical Exam  Vitals signs and nursing note reviewed.   Constitutional:       General: He is not in acute distress.     Appearance: He is ill-appearing. He is not diaphoretic.   HENT:      Head: Normocephalic and  atraumatic.      Mouth/Throat:      Mouth: Mucous membranes are dry.   Eyes:      General: No scleral icterus.        Right eye: No discharge.         Left eye: No discharge.      Pupils: Pupils are equal, round, and reactive to light.   Neck:      Vascular: No JVD.   Cardiovascular:      Rate and Rhythm: Normal rate and regular rhythm.   Pulmonary:      Effort: Pulmonary effort is normal.      Breath sounds: Normal breath sounds.   Abdominal:      General: Bowel sounds are normal. There is no distension.      Palpations: Abdomen is soft.      Tenderness: There is abdominal tenderness in the left upper quadrant and left lower quadrant. There is no rebound.   Musculoskeletal:      Right lower leg: No edema.      Left lower leg: No edema.   Skin:     General: Skin is warm.      Capillary Refill: Capillary refill takes less than 2 seconds.      Findings: No rash.   Neurological:      General: No focal deficit present.      Mental Status: He is alert.      Cranial Nerves: No cranial nerve deficit.   Psychiatric:         Mood and Affect: Mood normal.         Significant Labs:   BMP:   Recent Labs   Lab 10/30/20  0448         K 3.3*   *   CO2 12*   BUN 38*   CREATININE 1.8*   CALCIUM 7.4*     CBC:   Recent Labs   Lab 10/29/20  0444 10/30/20  0448   WBC 5.73 5.29   HGB 9.3* 9.2*   HCT 28.5* 27.7*    202       Significant Imaging: I have reviewed all pertinent imaging results/findings within the past 24 hours.

## 2020-10-30 NOTE — ASSESSMENT & PLAN NOTE
Patient's anemia is currently controlled. Has not received any PRBCs to date.. Etiology likely d/t chemo no also has iron-deficiency anemia, possibly has GI bleed  Continue ferrous sulfate 325mg PO BID  Current CBC reviewed-   Lab Results   Component Value Date    HGB 9.2 (L) 10/30/2020    HCT 27.7 (L) 10/30/2020     Monitor serial CBC and transfuse if patient becomes hemodynamically unstable, symptomatic or H/H drops below 7/21.

## 2020-10-31 NOTE — NURSING
Patient received from Endoscopy. Report received from Kerri CARR.  with -110. SBP stable. AAOx4. 2L NC in place. Patient received 2 units PRBCs pre-endo. 3 rd unit up and infusing now. Rectal tube in place with bright red stool. Oriented to room and call light. Call light is with in reach. Safety maintained.

## 2020-10-31 NOTE — ANESTHESIA PREPROCEDURE EVALUATION
10/31/2020  Alin Burkett is a 70 y.o., male.    Anesthesia Evaluation    I have reviewed the Patient Summary Reports.    I have reviewed the Nursing Notes. I have reviewed the NPO Status.   I have reviewed the Medications.     Review of Systems  Anesthesia Hx:  Denies Family Hx of Anesthesia complications.   Denies Personal Hx of Anesthesia complications.   Hematology/Oncology:         -- Anemia: Hematology Comments: Acute and chronic anemia Current/Recent Cancer. Oncology Comments: PTLD current Chemo     Cardiovascular:   Hypertension ECG has been reviewed. FRANCISCO/LVOTO  Dehydration, hypotension   Pulmonary:   Sleep Apnea    Renal/:   Chronic Renal Disease, ESRD    Hepatic/GI:   GI hemorrhage  C. Diff.   Endocrine:   Hypothyroidism        Physical Exam  General:  Cachexia    Airway/Jaw/Neck:  Airway Findings: Mouth Opening: Normal Tongue: Normal  General Airway Assessment: Adult  Mallampati: III  Improves to II with phonation.  TM Distance: Normal, at least 6 cm       Chest/Lungs:  Chest/Lungs Findings: Normal Respiratory Rate, Decreased Breath Sounds Bilateral     Heart/Vascular:  Heart Findings: Rate: Tachycardia  Rhythm: Irregularly Irregular        Mental Status:  Mental Status Findings:  Cooperative         Anesthesia Plan  Type of Anesthesia, risks & benefits discussed:  Anesthesia Type:  general  Patient's Preference:   Intra-op Monitoring Plan: standard ASA monitors  Intra-op Monitoring Plan Comments:   Post Op Pain Control Plan:   Post Op Pain Control Plan Comments:   Induction:   IV  Beta Blocker:  Patient is not currently on a Beta-Blocker (No further documentation required).       Informed Consent: Patient understands risks and agrees with Anesthesia plan.  Questions answered. Anesthesia consent signed with patient.  ASA Score: 4  emergent   Day of Surgery Review of History & Physical:    H&P  update referred to the provider.     Anesthesia Plan Notes: Patient needs hydration and blood transfusion prior to procedure.        Ready For Surgery From Anesthesia Perspective.

## 2020-10-31 NOTE — H&P
"History & Physical - Short Stay  Gastroenterology      SUBJECTIVE:     Procedure: Gastroscopy and Flex Sig    Chief Complaint/Indication for Procedure:  Anemia.  Diarrhea.  Rectal bleeding.    History of Present Illness:  See Dr. Garcia's Consult note:  "70 y.o. male with multiple medical problems including SVT, s/p renal transplant with CKD, diffuse large B cell lymphoma (complete RCHOP 2-3 weeks ago), who was admitted with severe, persistent, diarrhea due to C.diff 2 weeks ago, initially associated with significant abdominal pain. He had been improving however now reports worsening of diarrhea as well as blood in stool (in rectal tube). He continues to have mild lower abdominal cramping and decreased appetite (on TPN). He denies prior PUD or GI bleeding and his last colonoscopy was in 2013 and was diverticulosis of sigmoid, descending and transverse colon  ."    Dr. Garcia discussed his case with Dr. Tolbert, and semi-urgent endoscopies requested.  As noted above, he has a rectal tube in to manage the diarrhea.    Last Colonoscopy 10/10/2013:  Impression:          - Non-thrombosed external hemorrhoids found on                        perianal exam.                        - Diverticulosis in the sigmoid colon, in the                        descending colon, in the transverse colon and in the                        cecum.                        - The rectum and ascending colon are normal.   Recommendation:      - High fiber diet.                        - Repeat colonoscopy in 5 years for screening                        purposes.   Antwon Gutierrez MD   10/10/2013       Lab:  Results for TANG LAUREN (MRN [id number]) as of 10/30/2020 20:00   Ref. Range 10/26/2020 04:16 10/27/2020 04:42 10/28/2020 04:33 10/29/2020 04:44 10/30/2020 04:48 10/30/2020 17:33   WBC Latest Ref Range: 3.90 - 12.70 K/uL 5.43 5.52 5.16 5.73 5.29 3.99   RBC Latest Ref Range: 4.60 - 6.20 M/uL 3.33 (L) 2.91 (L) 3.04 (L) 2.88 (L) 2.83 " (L) 2.56 (L)   Hemoglobin Latest Ref Range: 14.0 - 18.0 g/dL 10.2 (L) 9.3 (L) 9.9 (L) 9.3 (L) 9.2 (L) 8.2 (L)   Hematocrit Latest Ref Range: 40.0 - 54.0 % 32.2 (L) 28.8 (L) 30.2 (L) 28.5 (L) 27.7 (L) 25.7 (L)   MCV Latest Ref Range: 82 - 98 fL 97 99 (H) 99 (H) 99 (H) 98 100 (H)       Results for TANG LAUREN (MRN [id number]) as of 10/30/2020 20:00   Ref. Range 10/29/2020 04:44 10/30/2020 04:48   BUN Latest Ref Range: 8 - 23 mg/dL 39 (H) 38 (H)   Creatinine Latest Ref Range: 0.5 - 1.4 mg/dL 2.0 (H) 1.8 (H)         eGFR if African American Latest Ref Range: >60 mL/min/1.73 m^2 38 (A) 43 (A)        CT Scan 10/16/2020:  Stomach, bowel, mesentery; thickening of the wall of the rectum and colon relatively sparing the region of the cecum/ascending colon and fairly marked involving the rectum and colon from transverse colon distally.  Mesenteric fat stranding..  No drainable abscess.  Appendix mildly dilated for example coronal series 601, image 57 axial series 2 images 72 through 82.  Mild fat stranding in the vicinity of the appendix but also fairly diffuse not appearing centered around the appendix..  Appearance of the appendix may be reactionary.     Impression:   Findings consistent with severe proctocolitis.   Transplanted kidney right side of the pelvis with mild pelvocaliectasis and perinephric stranding nonspecific.  No calcified stones seen   Additional findings as detailed above including cystic lesion with in the native right kidney versus dilatation of collecting structure of the native right kidney.  Stones in the native right kidney.  Cystic lesion within the pancreas.    Electronically signed by: Aad Castillo MD  Date:                                            10/16/2020        PTA Medications   Medication Sig    allopurinoL (ZYLOPRIM) 300 MG tablet Take 1 tablet (300 mg total) by mouth once daily.    aspirin (ECOTRIN) 81 MG EC tablet Take 1 tablet (81 mg total) by mouth once daily.     calcitRIOL (ROCALTROL) 0.5 MCG Cap Take 1 capsule (0.5 mcg total) by mouth once daily.    cefpodoxime (VANTIN) 100 MG tablet Take 1 tablet (100 mg total) by mouth every 12 (twelve) hours.    COMBIGAN 0.2-0.5 % Drop Place 1 drop into both eyes 2 (two) times a day.     famotidine (PEPCID) 20 MG tablet TAKE 1 TABLET EVERY EVENING (Patient taking differently: Take 20 mg by mouth every evening. )    ketoconazole (NIZORAL) 200 mg Tab TAKE ONE-HALF (1/2) TABLET ONCE DAILY (Patient taking differently: Take 100 mg by mouth once daily. )    levothyroxine (SYNTHROID) 100 MCG tablet TAKE 1 TABLET DAILY (Patient taking differently: Take 100 mcg by mouth before breakfast. Administer on an empty stomach at least 30 minutes before food. If receiving tube feeds, HOLD tube feeds for 1 hour before and after levothyroxine administration.)    magnesium oxide (MAG-OX) 400 mg (241.3 mg magnesium) tablet Take 1 tablet (400 mg total) by mouth once daily.    multivitamin (ONE DAILY MULTIVITAMIN) per tablet Take 1 tablet by mouth once daily.    ondansetron (ZOFRAN-ODT) 8 MG TbDL Dissolve 1 tablet (8 mg total) by mouth every 12 (twelve) hours as needed. (Patient taking differently: Take 8 mg by mouth every 12 (twelve) hours as needed (nausea). )    predniSONE (DELTASONE) 50 MG Tab Take 2 tablets (100 mg total) by mouth once daily. Take on days 2-5 of your chemotherapy cycles.    sodium bicarbonate 650 MG tablet Take 1 tablet (650 mg total) by mouth 2 (two) times daily.    tacrolimus (PROGRAF) 1 MG Cap Take 3 capsules (3 mg total) by mouth every morning AND 2 capsules (2 mg total) every evening. Z94.0/Kidney Transplant on 11/26/16.    travoprost (TRAVATAN Z) 0.004 % ophthalmic solution Place 1 drop into both eyes every evening.        Review of patient's allergies indicates:  No Known Allergies     Past Medical History:   Diagnosis Date    Acidosis     Adrenal adenoma     Anemia associated with chronic renal failure      Arrhythmia, onset 1995 2015    Awaiting organ transplant status 2013    Basal cell carcinoma 2012    left nasal tip    Blood type B+ 2013    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  Renal allograft biopsy 17 (DIVINE): 21 glomeruli, none globally sclerosed, <5% interstitial fibrosis, no ACR, c4d negative, AVR CCT Type 2 (V1 lesion); plan THYMO     Dissecting aortic aneurysm (any part), abdominal     Diverticulosis     Encounter for blood transfusion     ESRD (end stage renal disease) 2010    Febrile neutropenia 10/15/2020    H/O urethral stricture 2018    H/O: urethral stricture     History of AAA (abdominal aortic aneurysm) repair     History of urethral stricture 2018    Hypertension     Hypokalemia     Hypothyroidism 1/10/2014    Inguinal hernia bilateral, non-recurrent     Kidney stones     Organ transplant candidate 2013    Plantar warts 1/10/2014    Recurrent UTI 2017    S/P kidney transplant     Secondary hyperparathyroidism, renal     Sepsis 10/24/2020    Thyroid disease      Past Surgical History:   Procedure Laterality Date    ABDOMINAL SURGERY      exploratory lapatomy x 2    ABLATION N/A 2019    Procedure: ABLATION, SVT;  Surgeon: Emerson Mcmanus MD;  Location: [facility] EP LAB;  Service: Cardiology;  Laterality: N/A;  SVT, RFA, CARTO, anes, GP, 323    AORTA - SUPERIOR MESENTERIC ARTERY BYPASS GRAFT      BLADDER NECK RECONSTRUCTION      BLADDER SURGERY      COLONOSCOPY  10/10/2013    Dr. Gutierrez, repeat in 5 years    CYSTOSCOPY      CYSTOSCOPY N/A 2018    Procedure: CYSTOSCOPY;  Surgeon: Dewey Mann MD;  Location: [facility] OR [facility] FLR;  Service: Urology;   Laterality: N/A;  45 min    CYSTOURETHROSCOPY WITH DIRECT VISION INTERNAL URETHROTOMY N/A 12/19/2018    Procedure: CYSTOSCOPY, WITH DIRECT VISION INTERNAL URETHROTOMY;  Surgeon: Dewey Mann MD;  [facility];  Service: Urology;  Laterality: N/A;    DILATION OF URETHRA N/A 12/19/2018    Procedure: DILATION, URETHRA;  Surgeon: Dewey Mann MD;  [facility];  Service: Urology;  Laterality: N/A;    EXCISIONAL BIOPSY N/A 7/13/2020    Procedure: EXCISIONAL BIOPSY- LEFT INGUINAL NODE;  Surgeon: Vlad Epstein MD;  [address]  Service: General;  Laterality: N/A;    FLEXIBLE CYSTOSCOPY N/A 11/6/2019    Procedure: CYSTOSCOPY, FLEXIBLE;  Surgeon: Dewey Mann MD;  Location: [address];  Service: Urology;  Laterality: N/A;    GASTROJEJUNOSTOMY      HEMORRHOID SURGERY      HERNIA REPAIR      INSERTION OF VENOUS ACCESS PORT Left 7/27/2020    Procedure: INSERTION, VENOUS ACCESS PORT;  Surgeon: Vlad Epstein MD;  Location: [address];  Service: General;  Laterality: Left;    KIDNEY TRANSPLANT      LEFT HEART CATHETERIZATION Left 8/20/2019    Procedure: Left heart cath;  Surgeon: Javan Oscar MD;  Location: [facility] CATH/EP LAB;  Service: Cardiology;  Laterality: Left;    LITHOTRIPSY      LYMPH NODE BIOPSY N/A 6/30/2020    Procedure: BIOPSY, LYMPH NODE;  Surgeon: [facility];  Location: [address];  Service: General;  Laterality: N/A;  189 lymph node biopsy /ULTRASOUND    PERCUTANEOUS NEPHROLITHOTRIPSY      right  ESWL  10/31/12    right ESWL  6/26/12    URETHROPLASTY USING PATCH GRAFT N/A 11/6/2019    Procedure: URETHROPLASTY, USING PATCH GRAFT BUCCAL MUCOSA GRAFT;  Surgeon: Dewey Mann MD;  Location: [address];  Service: Urology;  Laterality: N/A;  3 HOURS     Family History   Problem Relation Age of Onset    Diabetes Mother     Alzheimer's disease Mother     Alcohol abuse Father     HIV Brother      "Stroke Maternal Aunt     Kidney disease Paternal Uncle     Kidney disease Cousin     No Known Problems Sister     No Known Problems Daughter     No Known Problems Sister     No Known Problems Brother     No Known Problems Brother     Cancer Brother         thyroid cancer    Colon cancer Brother     Melanoma Neg Hx     Psoriasis Neg Hx     Lupus Neg Hx     Eczema Neg Hx     Colon polyps Neg Hx     Crohn's disease Neg Hx     Ulcerative colitis Neg Hx     Celiac disease Neg Hx      Social History     Tobacco Use    Smoking status: Former Smoker     Packs/day: 0.50     Years: 40.00     Pack years: 20.00     Types: Cigarettes     Quit date: 6/16/2010     Years since quitting: 10.3    Smokeless tobacco: Never Used   Substance Use Topics    Alcohol use: Yes     Alcohol/week: 3.0 standard drinks     Types: 3 Cans of beer per week     Comment: occasional/social    Drug use: No     Comment: THC in youth         OBJECTIVE:     Vital Signs (Most Recent)  Temp: 97 °F (36.1 °C) (10/30/20 1527)  Pulse: 64 (10/30/20 1527)  Resp: 18 (10/30/20 1527)  BP: 112/65 (10/30/20 1527)  SpO2: 96 % (10/30/20 1946)    Physical Exam:  :Ht: 5' 11" (180.3 cm)   Wt: 80.1 kg (176 lb 9.4 oz)   BMI: 24.63 kg/m²                                                          GENERAL:  Comfortable, in no acute distress.                                 HEENT EXAM:  Nonicteric.  No adenopathy.  Oropharynx is clear.               NECK:  Supple.                                                               LUNGS:  Clear.                                                               CARDIAC:  Regular rate and rhythm.  S1, S2.  No murmur.                      ABDOMEN:  Soft, positive bowel sounds, slightly tender LLQ.  No hepatosplenomegaly or masses.  No rebound or guarding.                                             EXTREMITIES:  No edema.     MENTAL STATUS:  Alert and oriented.    ASSESSMENT/PLAN:     Assessment:  Anemia.  Diarrhea.  Rectal " bleeding.    Plan: Gastroscopy and Flex Sig    Anesthesia Plan:   MAC / General Anaesthesia    ASA Grade: ASA 3 - Patient with moderate systemic disease with functional limitations    MALLAMPATI SCORE: I (soft palate, uvula, fauces, and tonsillar pillars visible)

## 2020-10-31 NOTE — PROGRESS NOTES
" INPATIENT NEPHROLOGY PROGRESS  [facility] NEPHROLOGY    Alin Burkett  10/31/2020    Reason for consultation:  Acute kidney injury     History of Present Illness:  Patient has been having diarrhea with "jelly-like" consistency as well as crampiness in the abdomen.  Symptoms began roughly one week ago.  Also has had fever and malaise.  Appetite poor.  Fatigue as well.  He has been receiving chemo for PTLD; most recent cycle of CHOP was end of last week.  He has history of renal transplant four years ago and is currently on twice-a-day  Prograf.  He's had no cough, no unusual headache, no sinus pain, and no burning on urination.     10/20  Has some diarrhea and abdominal pain.  No nausea, chest pain, sob, new neuro symptoms, new joint pain.  He is very weak.    I told him that he had previously been seen by doctor Shonda and I would call him to see him.  He stated he didn't want to see Dr Merchant and requested that I become his nephrologist.    10/21  Not much change in renal function since yesterday. UOP 1.3L.  Sleeping quietly.  10/23  Sleeping.  1550 urine output.    10/24 VSS, no new complains. Appreciate ID and Heme input.  10/25 VSS, no new complains. sCr is better.  10/26 VSS, no new complains. Labs are acceptable. Lethargic at times.  10/27 VSS, no new complains.  10/28 VSS, no new complains. Needs to eat bettter.  10/29 VSS, no new complains.   10/30 VSS, no new complains. Still having blood-tinged diarrhea, treated for c.diff. On TPN due to very poor oral intake. Dr. Malave will cover after 5pm today and over the weekend.  10/31 sleeping.  AFVSS     Plan of Care:    Acute kidney injury 2/2 c.diff colitis  Kidney transplant in place  --urine sodium was low implicating renal hypoperfusion   --avoid NSAIDS, Sales II inhibitors, and other non-essential nephrotoxic agents  --renal dose medication for crcl 10-50  --keep map above 55  --treat c.diff, appreciate ID input  --continue IS for kidney transplant.    Nongap " metabolic acidosis likely combination of GI losses and renal tubular defect (urine pH inappropriately high)  --urine anion gap not accurate due to low urine sodium     PTLD lymphoma, s/p renal transplant  Anemia  --tacrolimus level therapeutic  --appreciate Heme input    Hyponatremia  Hypokalemia  Acidosis  --added KCL and bicarb, needs better oral intake, agree with clinimix.  --avoid hypotonic iv piggy backs  --no ssri antidepressants or thiazide diuretics    Thank you for allowing us to participate in this patient's care. We will continue to follow.    Vital Signs:  Temp Readings from Last 3 Encounters:   10/31/20 (!) 100.5 °F (38.1 °C)   10/10/20 98.3 °F (36.8 °C) (Oral)   10/09/20 98.6 °F (37 °C)       Pulse Readings from Last 3 Encounters:   10/31/20 104   10/10/20 95   10/09/20 (!) 56       BP Readings from Last 3 Encounters:   10/31/20 102/72   10/10/20 102/63   10/09/20 115/76       Weight:  Wt Readings from Last 3 Encounters:   10/29/20 80.1 kg (176 lb 9.4 oz)   10/10/20 68.5 kg (151 lb)   10/09/20 66.4 kg (146 lb 6.2 oz)       Past Medical & Surgical History:  Past Medical History:   Diagnosis Date    Acidosis     Adrenal adenoma     Anemia associated with chronic renal failure     Arrhythmia, onset 2015    Awaiting organ transplant status 2013    Basal cell carcinoma 2012    left nasal tip    Blood type B+ 2013    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  Renal allograft biopsy 17 (DIVINE): 21 glomeruli, none globally sclerosed, <5% interstitial fibrosis, no ACR, c4d negative, AVR CCT Type 2 (V1 lesion); plan THYMO     Dissecting aortic aneurysm (any part), abdominal     Diverticulosis     Encounter for  blood transfusion     ESRD (end stage renal disease) 06/16/2010    Febrile neutropenia 10/15/2020    H/O urethral stricture 11/27/2018    H/O: urethral stricture     History of AAA (abdominal aortic aneurysm) repair     History of urethral stricture 12/19/2018    Hypertension     Hypokalemia     Hypothyroidism 1/10/2014    Inguinal hernia bilateral, non-recurrent     Kidney stones     Organ transplant candidate 11/26/2013    Plantar warts 1/10/2014    Recurrent UTI 7/28/2017    S/P kidney transplant     Secondary hyperparathyroidism, renal     Sepsis 10/24/2020    Thyroid disease        Past Surgical History:   Procedure Laterality Date    ABDOMINAL SURGERY      exploratory lapatomy x 2    ABLATION N/A 8/22/2019    Procedure: ABLATION, SVT;  Surgeon: Emerson Mcmanus MD;  Location: [facility] EP LAB;  Service: Cardiology;  Laterality: N/A;  SVT, RFA, CARTO, anes, GP, 323    AORTA - SUPERIOR MESENTERIC ARTERY BYPASS GRAFT      BLADDER NECK RECONSTRUCTION      BLADDER SURGERY      COLONOSCOPY  10/10/2013    Dr. Gutierrez, repeat in 5 years    CYSTOSCOPY      CYSTOSCOPY N/A 12/19/2018    Procedure: CYSTOSCOPY;  Surgeon: Dewey Mann MD;  Location: [facility] OR [address] [place] [address];  Service: Urology;  Laterality: N/A;  45 min    CYSTOURETHROSCOPY WITH DIRECT VISION INTERNAL URETHROTOMY N/A 12/19/2018    Procedure: CYSTOSCOPY, WITH DIRECT VISION INTERNAL URETHROTOMY;  Surgeon: Dewey Mann MD;  Location: [facility] OR [address] [place] [address];  Service: Urology;  Laterality: N/A;    DILATION OF URETHRA N/A 12/19/2018    Procedure: DILATION, URETHRA;  Surgeon: Dewey Mann MD;  [facility];  Service: Urology;  Laterality: N/A;    EXCISIONAL BIOPSY N/A 7/13/2020    Procedure: EXCISIONAL BIOPSY- LEFT INGUINAL NODE;  Surgeon: Vlad Epstein MD;  Location: [facility] OR 2ND FLR;  Service: General;  Laterality: N/A;    FLEXIBLE CYSTOSCOPY N/A 11/6/2019    Procedure: CYSTOSCOPY, FLEXIBLE;  Surgeon: Dewey Mann MD;   [address]  Service: Urology;  Laterality: N/A;    GASTROJEJUNOSTOMY      HEMORRHOID SURGERY      HERNIA REPAIR      INSERTION OF VENOUS ACCESS PORT Left 7/27/2020    Procedure: INSERTION, VENOUS ACCESS PORT;  Surgeon: Vlad Epstein MD;  Location: [address];  Service: General;  Laterality: Left;    KIDNEY TRANSPLANT      LEFT HEART CATHETERIZATION Left 8/20/2019    Procedure: Left heart cath;  Surgeon: Javan Oscar MD;  Location: [facility] CATH/EP LAB;  Service: Cardiology;  Laterality: Left;    LITHOTRIPSY      LYMPH NODE BIOPSY N/A 6/30/2020    Procedure: BIOPSY, LYMPH NODE;  Surgeon: [facility] Diagnostic Provider;  Location: [address];  Service: General;  Laterality: N/A;  189 lymph node biopsy /ULTRASOUND    PERCUTANEOUS NEPHROLITHOTRIPSY      right  ESWL  10/31/12    right ESWL  6/26/12    URETHROPLASTY USING PATCH GRAFT N/A 11/6/2019    Procedure: URETHROPLASTY, USING PATCH GRAFT BUCCAL MUCOSA GRAFT;  Surgeon: Dewey Mann MD;  Location: [address];  Service: Urology;  Laterality: N/A;  3 HOURS       Past Social History:  Social History     Socioeconomic History    Marital status: Single     Spouse name: Not on file    Number of children: Not on file    Years of education: Not on file    Highest education level: Not on file   Occupational History     Employer: Disabled   Social Needs    Financial resource strain: Not on file    Food insecurity     Worry: Not on file     Inability: Not on file    Transportation needs     Medical: Not on file     Non-medical: Not on file   Tobacco Use    Smoking status: Former Smoker     Packs/day: 0.50     Years: 40.00     Pack years: 20.00     Types: Cigarettes     Quit date: 6/16/2010     Years since quitting: 10.3    Smokeless tobacco: Never Used   Substance and Sexual Activity    Alcohol use: Yes     Alcohol/week: 3.0 standard drinks     Types: 3 Cans of beer per week     Comment: occasional/social    Drug use:  No     Comment: THC in youth    Sexual activity: Yes     Partners: Female     Birth control/protection: None   Lifestyle    Physical activity     Days per week: Not on file     Minutes per session: Not on file    Stress: Not on file   Relationships    Social connections     Talks on phone: Not on file     Gets together: Not on file     Attends Orthodox service: Not on file     Active member of club or organization: Not on file     Attends meetings of clubs or organizations: Not on file     Relationship status: Not on file   Other Topics Concern    Not on file   Social History Narrative    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      Current Outpatient Medications on File Prior to Encounter   Medication Sig Dispense Refill    allopurinoL (ZYLOPRIM) 300 MG tablet Take 1 tablet (300 mg total) by mouth once daily. 30 tablet 2    aspirin (ECOTRIN) 81 MG EC tablet Take 1 tablet (81 mg total) by mouth once daily.  0    calcitRIOL (ROCALTROL) 0.5 MCG Cap Take 1 capsule (0.5 mcg total) by mouth once daily. 30 capsule 11    cefpodoxime (VANTIN) 100 MG tablet Take 1 tablet (100 mg total) by mouth every 12 (twelve) hours. 60 tablet 3    COMBIGAN 0.2-0.5 % Drop Place 1 drop into both eyes 2 (two) times a day.       famotidine (PEPCID) 20 MG tablet TAKE 1 TABLET EVERY EVENING (Patient taking differently: Take 20 mg by mouth every evening. ) 90 tablet 3    ketoconazole (NIZORAL) 200 mg Tab TAKE ONE-HALF (1/2) TABLET ONCE DAILY (Patient taking differently: Take 100 mg by mouth once daily. ) 45 tablet 3    levothyroxine (SYNTHROID) 100 MCG tablet TAKE 1 TABLET DAILY (Patient taking differently: Take 100 mcg by mouth before breakfast. Administer on an empty stomach at least 30 minutes before food. If receiving tube feeds, HOLD tube feeds for 1 hour before and after levothyroxine administration.) 90 tablet 3     magnesium oxide (MAG-OX) 400 mg (241.3 mg magnesium) tablet Take 1 tablet (400 mg total) by mouth once daily. 90 tablet 3    multivitamin (ONE DAILY MULTIVITAMIN) per tablet Take 1 tablet by mouth once daily.      ondansetron (ZOFRAN-ODT) 8 MG TbDL Dissolve 1 tablet (8 mg total) by mouth every 12 (twelve) hours as needed. (Patient taking differently: Take 8 mg by mouth every 12 (twelve) hours as needed (nausea). ) 21 tablet 1    predniSONE (DELTASONE) 50 MG Tab Take 2 tablets (100 mg total) by mouth once daily. Take on days 2-5 of your chemotherapy cycles. 8 tablet 0    sodium bicarbonate 650 MG tablet Take 1 tablet (650 mg total) by mouth 2 (two) times daily. 540 tablet 3    tacrolimus (PROGRAF) 1 MG Cap Take 3 capsules (3 mg total) by mouth every morning AND 2 capsules (2 mg total) every evening. Z94.0/Kidney Transplant on 11/26/16. 150 capsule 11    travoprost (TRAVATAN Z) 0.004 % ophthalmic solution Place 1 drop into both eyes every evening.        Scheduled Meds:   allopurinoL  100 mg Oral Daily    aspirin  81 mg Oral Daily    brimonidine-timoloL  1 drop Both Eyes Q12H    calcitRIOL  0.5 mcg Oral Daily    ceFEPime (MAXIPIME) IVPB  1 g Intravenous Q12H    cholestyramine-aspartame  1 packet Oral TID    fat emulsion 20%  250 mL Intravenous Daily    ferrous sulfate  325 mg Oral BID    fidaxomicin  200 mg Oral BID    levothyroxine  100 mcg Oral Before breakfast    metronidazole  500 mg Intravenous Q8H    midodrine  10 mg Oral TID    mupirocin   Topical (Top) BID    pantoprazole  40 mg Intravenous BID    potassium chloride  20 mEq Oral TID    sodium bicarbonate  650 mg Oral TID    tacrolimus  2 mg Oral Daily PM    tacrolimus  3 mg Oral Daily AM    travoprost  1 drop Both Eyes QHS     Continuous Infusions:   Amino acid 4.25% - dextrose 5% (CLINIMIX-E) solution with additives (1L provides 42.5 gm AA, 50 gm CHO (170 kcal/L dextrose), Na 35, K 30, Mg 5, Ca 4.5, Acetate 70, Cl 39, Phos 15)       "lactated ringers 75 mL/hr at 10/31/20 0749     PRN Meds:.acetaminophen, albuterol-ipratropium, dicyclomine, hyoscyamine, magnesium sulfate IVPB, magnesium sulfate IVPB, metoclopramide HCl, morphine, ondansetron, potassium chloride in water **AND** potassium chloride in water **AND** potassium chloride in water, promethazine, sodium chloride 0.9%, Pharmacy to dose Vancomycin consult **AND** vancomycin - pharmacy to dose    Allergies:  Patient has no known allergies.    Past Family History:  Reviewed; refer to Hospitalist Admission Note    Review of Systems:  Review of Systems - All 14 systems reviewed and negative, except as noted in HPI    Physical Exam:    /72   Pulse 104   Temp (!) 100.5 °F (38.1 °C)   Resp 18   Ht 5' 11" (1.803 m)   Wt 80.1 kg (176 lb 9.4 oz)   SpO2 97%   BMI 24.63 kg/m²     General Appearance:    Alert, cooperative, no distress, appears stated age   Head:    Normocephalic, without obvious abnormality, atraumatic   Eyes:    PER, conjunctiva/corneas clear, EOM's intact in both eyes        Throat:   Lips, mucosa, and tongue normal; teeth and gums normal   Back:     Symmetric, no curvature, ROM normal, no CVA tenderness   Lungs:     Clear to auscultation bilaterally, respirations unlabored   Chest wall:    No tenderness or deformity   Heart:    Regular rate and rhythm, S1 and S2 normal, no murmur, rub   or gallop   Abdomen:     Tender to palpation   Extremities:   Extremities normal, atraumatic, no cyanosis or edema   Pulses:   2+ and symmetric all extremities   MSK:   No joint or muscle swelling, tenderness or deformity   Skin:   Skin color, texture, turgor normal, no rashes or lesions   Neurologic:   CNII-XII intact, normal strength and sensation       Throughout.  No flap     Results:  Lab Results   Component Value Date     (L) 10/31/2020    K 3.7 10/31/2020     (H) 10/31/2020    CO2 11 (L) 10/31/2020    BUN 38 (H) 10/31/2020    CREATININE 1.8 (H) 10/31/2020    CALCIUM " 7.3 (L) 10/31/2020    ANIONGAP 6 (L) 10/31/2020    ESTGFRAFRICA 43 (A) 10/31/2020    EGFRNONAA 38 (A) 10/31/2020       Lab Results   Component Value Date    CALCIUM 7.3 (L) 10/31/2020    PHOS 2.8 10/21/2020       Recent Labs   Lab 10/31/20  0656   WBC 4.30  4.30   RBC 2.27*  2.27*   HGB 7.3*  7.3*   HCT 22.8*  22.8*     194   *  100*   MCH 32.2*  32.2*   MCHC 32.0  32.0          I have personally reviewed pertinent radiological imaging and reports.

## 2020-10-31 NOTE — PROGRESS NOTES
"Progress Note  Infectious Disease    Reason for Consult:  C difficile colitis, neutropenia, sepsis    HPI: Alin Burkett is a   70 y.o. male who is status post renal transplant and is receiving chemotherapy for diffuse large B-cell lymphoma, presented to the emergency room on 10/15 with worsening of chronic diarrhea, abdominal cramps of 1 weeks duration.  He was found to be neutropenic with a white blood cell count of 0.2, volume depleted, hypokalemic, was cultured and given fluid resuscitation and vancomycin and cefepime.  He was admitted to the intensive care unit. He was recently given cefpodoxime to take prophylactically associated with his chemotherapy for recurring urinary tract infections.  Most recent positive urine culture was September 18th with E coli sensitive to 3rd generation cephalosporins carbapenems  He has had a hectic fever, stool for C difficile toxin was obtained and was resulted as positive today.  he has had a positive C difficile test before, August 2019.  White blood cells remain depressed at 0.1, platelets are depressed at 91,000, creatinine 1.6.  CT scan of the abdomen obtained last night shows severe proctocolitis without megacolon. Neupogen has been ordered. He is discouraged from eating by severe cramps.      10/17/2020 tmax is improved. Continues to be neutropenic Continues to have cramping, intermittent, abdominal pain. + diarrhea "lost count how many" He had refused vancomycin in am but decided to take it now  10/18/2020 afebrile,(103 on 16th)  ANC improved on granix 0---1029) he is feeling much better today.  Less abdominal pain.  Less loose stools.  10/19/2020.  Afebrile.  T-max 99°.  Less need for pressors.  WBC 5.  Creatinine slightly worse at 1.9.  Discussed with nurse.  Patient may have had some hallucinations earlier  10/20/2020.  Afebrile. Less abdominal pain. Less diarrhea. He feels his abdomen is still distended and does not allow him to take a deep breath. KUB  Is read " as below, I feel that transvere colon is a little bigger today. Creatinine has worsened. Bicarb is 9. Nephrologist is giving bicarb drip.  Las Vancomycin iv and cefepime were  given on 18   Tolerating vancomycin by mouth and metronidazole    10/21: interim reviewed d/w Dr. Jiménez. Requiring bicarb drip. WBC 14 after neupogen, platelets better. Cr stable. Severe hypoalbuminemia. KUB not ominous, CXR with blunted left CPA. Wife reports he is sleeping all of the time and not eating. I aroused him and he agreed to eat some pudding  10/22: interim reviewed. Renal function slightly worse, bicarb corrected to 18 on drip. Urine output is poor and incontinent, midodrine started. Not eating.   10/23:  Interim reviewed, afebrile, oxygenating normally, off norepinephrine since administration of my do drain.  Stools becoming more solid, 2 measured since 0 700, still having incontinence.  Able to do some physical therapy exercises but physical therapy recommends LTAC/rehab/SNF.  Urine output is good, creatinine has stabilized 2.8, white blood cells normal.  CO2 still low at 15.  No new imaging. Slept during visit, discussed with RN and wife.   10/24:  Afebrile, oxygenating well, sleeping and not eating, urine output is very good, creatinine improved, stools with improving form  10/25: low grade temp this afternoon. No stools recorded yet but nursing reports 10 small stools. Patient reports urinary incontinence but waiting until urge before trying to void. Offered condom catheter.. Creatinine a little better. No appetite. Very irritable about being pushed to eat.  10/26: afebrile. Continues to have incontinent loose stools. Declining much physical therapy. Referral sent to LTAC. Cr better. Questran was discontinued, not sure why. He is asleep and I did not disturb, but spoke with his brother.   10/27:alert and interactive this evening. After discussion, he intends to try to eat(he fears cramping) and get up to a chair tomorrow. D/w  patient and wife about LTAC. Discouraged going home with TPN.   10/29: no fever, sats good, 550 cc of stool recorded for yesterday, white blood cells normal, hemoglobin a little lower, creatinine is 2, urinalysis had 50 white cells, urine culture pending but this was obtained from a urinal and not as a clean-catch specimen and will be canceled., blood culture negative so far.  He ate less than 50% of his breakfast and has not been up to the chair as of 11:00 a.m..  He is agreeable to change anti spasmodic.  P.r.n.  10/30: afebrile. Grossly bloody stools in flexiseal. hgb did drop from 9.2 to 8.2. EGD and FFS tomorrow. first urine culture was taken from urinal. Second one obtained by proper method(after d/w RN). Both have pseudomonas. hehas no tenderness over transplant kidney and no dysuria. Blood culture taken from port last pm has GPC. Changing jimenez tonight and repeat culture. He states he is eating more but this is not accurate.  10/31: called to endoscopy lab to see wall to wall pseudomembranes, active generalized colonic bleeding. Patient required blood transfusion and volume expansion prior to the procedure. He will be moved to ICU after procedure. Discussed with Dr. Edwards and patient's wife and Dr. Decker that he should have a colectomy.     Antibiotics (From admission, onward)    Start     Stop Route Frequency Ordered    10/31/20 1900  vancomycin 25 mg/mL oral solution 500 mg  (C. difficile Infection (CDI) Treatment Order Panel)      -- Oral Every 6 hours 10/31/20 1751    10/31/20 1900  metronidazole IVPB 500 mg  (C. difficile Infection (CDI) Treatment Order Panel)      -- IV Every 8 hours (non-standard times) 10/31/20 1751    10/31/20 1900  vancomycin (VANCOCIN) 500 mg in sodium chloride 0.9% 100 mL ENEMA  (C. difficile Infection (CDI) Treatment Order Panel)      -- Rect Every 6 hours 10/31/20 1751    10/30/20 2100  mupirocin 2 % ointment      -- Top 2 times daily 10/30/20 1855    10/30/20 2100   fidaxomicin tablet 200 mg      11/09 2059 Oral 2 times daily 10/30/20 1903    10/30/20 2045  cefepime in dextrose 5 % 1 gram/50 mL IVPB 1 g      -- IV Every 12 hours (non-standard times) 10/30/20 1934    10/30/20 2045  metronidazole IVPB 500 mg      -- IV Every 8 hours (non-standard times) 10/30/20 1934    10/30/20 2035  vancomycin - pharmacy to dose  (vancomycin IVPB)      -- IV pharmacy to manage frequency 10/30/20 1935             EXAM & DIAGNOSTICS REVIEWED:   Vitals:     Temp:  [97.2 °F (36.2 °C)-100.5 °F (38.1 °C)]   Temp: 99 °F (37.2 °C) (10/31/20 1558)  Pulse: (!) 113 (10/31/20 1558)  Resp: (!) 23 (10/31/20 1558)  BP: 112/72 (10/31/20 1558)  SpO2: 100 % (10/31/20 1558)    Intake/Output Summary (Last 24 hours) at 10/31/2020 1752  Last data filed at 10/31/2020 1721  Gross per 24 hour   Intake 3521.55 ml   Output 2925 ml   Net 596.55 ml      exam 10/30, was present for colonoscopy  General:  In NAD.  Alert and attends and is appropriate    Eyes:   anicteric, EOMI  ENT:   no oral lesions  Neck:  Supple   Lungs: Clear, no consolidation, rales, wheezes, rub  Heart:  RRR, no gallop/murmur/rub noted  Abd:  Soft, no guarding, active BS, no masses or organomegaly appreciated.  No tenderness. Rectal tube in place with liquid stool and gross dark blood  :  Voids   no flank tenderness  Musc:  Joints without effusion, swelling, erythema, synovitis,    Skin:  No rashes.    Wound:   Neuro: Alert, conversant, non focal.    Psych:    more engaged in his own care.    Extrem: generalized edema, no erythema, phlebitis, cellulitis, warm and well perfused  VAD:  portacath without redness, drainage, due for site care 10/30    Isolation:  Special contact         General Labs reviewed:  Recent Labs   Lab 10/31/20  0004 10/31/20  0656 10/31/20  1241   WBC 4.51 4.30  4.30 3.86*   HGB 7.9* 7.3*  7.3* 6.7*   HCT 24.5* 22.8*  22.8* 21.6*    194  194 153       Recent Labs   Lab 10/29/20  0444 10/30/20  0448 10/31/20  0656   NA  136 137 134*   K 3.2* 3.3* 3.7   * 115* 117*   CO2 11* 12* 11*   BUN 39* 38* 38*   CREATININE 2.0* 1.8* 1.8*   CALCIUM 7.4* 7.4* 7.3*     Micro:  Microbiology Results (last 7 days)     Procedure Component Value Units Date/Time    Stool culture [[id number]]     Order Status: No result Specimen: Stool     Clostridium difficile EIA [[id number]]     Order Status: No result Specimen: Stool     E. coli 0157 antigen [[id number]] Collected: 10/31/20 0050    Order Status: No result Specimen: Stool Updated: 10/31/20 0955    Stool culture [[id number]] Collected: 10/31/20 0050    Order Status: Sent Specimen: Stool Updated: 10/31/20 0955    Urine culture [[id number]]  (Abnormal)  (Susceptibility) Collected: 10/28/20 1831    Order Status: Completed Specimen: Urine Updated: 10/31/20 0937     Urine Culture, Routine PSEUDOMONAS AERUGINOSA  > 100,000 cfu/ml      Narrative:      Specimen Source->Urine    Blood culture [[id number]]  (Abnormal) Collected: 10/28/20 1728    Order Status: Completed Specimen: Blood Updated: 10/31/20 0809     Blood Culture, Routine Gram stain aer bottle: Gram positive cocci in clusters resembling Staph       Results called to and read back by: Fifi Li, Charge Nurse       10/30/2020  19:25      STAPHYLOCOCCUS SPECIES  Identification and susceptibility pending      Narrative:      DRAW FROM [facility]    Blood culture [[id number]] Collected: 10/30/20 2022    Order Status: Completed Specimen: Blood Updated: 10/31/20 0715     Blood Culture, Routine No Growth to date    Narrative:      Change Woods needle and then draw blood culture from the  portacat    Urine Culture High Risk [[id number]]  (Abnormal) Collected: 10/29/20 1744    Order Status: Completed Specimen: Urine, Clean Catch Updated: 10/30/20 1700     Urine Culture, Routine GRAM NEGATIVE DESI  >100,000 cfu/ml  Identification and susceptibility pending      Narrative:      Indicated criteria for high risk culture:->Other  Other (specify):->transplant pt         Imaging Reviewed:   KUBThree round radiodensities overlie the left upper quadrant may represent ingested pills or be in the patient's clothing.  A radiodense thread is noted over the right lower quadrant of uncertain significance.   CXR clear   CT abdomen and pelvis 10/16 :    Findings consistent with severe proctocolitis.    Transplanted kidney right side of the pelvis with mild pelvocaliectasis and perinephric stranding nonspecific.  No calcified stones seen Additional findings as detailed above including cystic lesion with in the native right kidney versus dilatation of collecting structure of the native right kidney.  Stones in the native right kidney.  Cystic lesion within the pancreas  Cardiology:    IMPRESSION & PLAN   1. Severe C. Difficile colitis, improved from ICU status of septic shock, but still having diarrhea despite aggressive treatment     Prompted by oral antibiotics and/or chemotherapy   History of Cdiff 8/2019   Metabolic acidosis,improved   REFRACTORY SEVERE PSEUDOMEMBRANOUS COLITIS WITH GENERALIZED COLONIC BLEEDING.     2. PTLD, EBV related lymphoma, receiving chemotherapy      neutropenia, resolved  3. S/p renal transplant on immunosuppression, [facility], Cr   improving  4. Chronic diarrhea  5. History of recurrent UTIs  6. Anorexia  7. Pseudomonas UTI 10/29(1st cx from urinal , second culture is CCMS)  8. GI bleeding 10/30  9. Blood culture with GPC 10/29, significance unknown    Recommendations:  Unfortunately, I recommend he have a colectomy. I do not believe the most aggressive medical therapy will save his colon, nor stop him from continued LGI bleeding.   Unsure if fecal transplants are still on hold (FDA)  High dose oral vanc, oral dificid, vancomycin enemas, IV flagyl and consider adding tygacil as well.   Really, he is a vanc failure.     Consult Dr. Guerrero, vs move to [facility] which wife is considering.   Will also have to treat the pseudomonas UTI, with cefepime  Awaiting repeat blood  culture from PAC , continue IV vancomycin     D/w ron Blank and patient's wife

## 2020-10-31 NOTE — PROGRESS NOTES
GI note.  See labs.  Had signif drop in H/H.    Results for TANG LAUREN (MRN [id number]) as of 10/31/2020 14:16   Ref. Range 10/29/2020 04:44 10/30/2020 04:48 10/30/2020 17:33 10/31/2020 00:04 10/31/2020 06:56 10/31/2020 06:56 10/31/2020 12:41   WBC Latest Ref Range: 3.90 - 12.70 K/uL 5.73 5.29 3.99 4.51 4.30 4.30 3.86 (L)   RBC Latest Ref Range: 4.60 - 6.20 M/uL 2.88 (L) 2.83 (L) 2.56 (L) 2.46 (L) 2.27 (L) 2.27 (L) 2.08 (L)   Hemoglobin Latest Ref Range: 14.0 - 18.0 g/dL 9.3 (L) 9.2 (L) 8.2 (L) 7.9 (L) 7.3 (L) 7.3 (L) 6.7 (L)   Hematocrit Latest Ref Range: 40.0 - 54.0 % 28.5 (L) 27.7 (L) 25.7 (L) 24.5 (L) 22.8 (L) 22.8 (L) 21.6 (L)   MCV Latest Ref Range: 82 - 98 fL 99 (H) 98 100 (H) 100 (H) 100 (H) 100 (H) 104 (H)   MCH Latest Ref Range: 27.0 - 31.0 pg 32.3 (H) 32.5 (H) 32.0 (H) 32.1 (H) 32.2 (H) 32.2 (H) 32.2 (H)   MCHC Latest Ref Range: 32.0 - 36.0 g/dL 32.6 33.2 31.9 (L) 32.2 32.0 32.0 31.0 (L)   RDW Latest Ref Range: 11.5 - 14.5 % 17.2 (H) 16.8 (H) 17.2 (H) 17.1 (H) 17.2 (H) 17.2 (H) 17.2 (H)   Platelets Latest Ref Range: 150 - 350 K/uL 196 202 181 190 194 194 153       See anaesthesia note.  Discussed with Dr. Adler.  Cardiovascular:   Hypertension ECG has been reviewed. FRANCISCO/LVOTO  Dehydration, hypotension   Anesthesia Plan Notes: Patient needs hydration and blood transfusion prior to procedure.      Plan:  Will transfuse now, and plan endos to follow.

## 2020-10-31 NOTE — TRANSFER OF CARE
"Anesthesia Transfer of Care Note    Patient: Alin Burkett    Procedure(s) Performed: Procedure(s) (LRB):  ESOPHAGOGASTRODUODENOSCOPY (EGD) (N/A)  SIGMOIDOSCOPY, FLEXIBLE (N/A)    Patient location: GI    Anesthesia Type: general    Transport from OR: Transported from OR on room air with adequate spontaneous ventilation    Post pain: adequate analgesia    Post assessment: no apparent anesthetic complications    Post vital signs: stable    Level of consciousness: awake    Nausea/Vomiting: no nausea/vomiting    Complications: none    Transfer of care protocol was followed      Last vitals:   Visit Vitals  /72   Pulse (!) 113   Temp 37.2 °C (99 °F) (Oral)   Resp (!) 23   Ht 5' 11" (1.803 m)   Wt 80.1 kg (176 lb 9.4 oz)   SpO2 100%   BMI 24.63 kg/m²     "

## 2020-10-31 NOTE — PT/OT/SLP PROGRESS
Physical Therapy      Patient Name:  Alin Burkett   MRN:  [id number]    Patient not seen today secondary to being taken to test @ 1310.  Will follow-up 11/01.    Carlos Leroy, PT

## 2020-10-31 NOTE — PROGRESS NOTES
"Progress Note  Infectious Disease    Reason for Consult:  C difficile colitis, neutropenia, sepsis    HPI: Alin Burkett is a   70 y.o. male who is status post renal transplant and is receiving chemotherapy for diffuse large B-cell lymphoma, presented to the emergency room on 10/15 with worsening of chronic diarrhea, abdominal cramps of 1 weeks duration.  He was found to be neutropenic with a white blood cell count of 0.2, volume depleted, hypokalemic, was cultured and given fluid resuscitation and vancomycin and cefepime.  He was admitted to the intensive care unit. He was recently given cefpodoxime to take prophylactically associated with his chemotherapy for recurring urinary tract infections.  Most recent positive urine culture was September 18th with E coli sensitive to 3rd generation cephalosporins carbapenems  He has had a hectic fever, stool for C difficile toxin was obtained and was resulted as positive today.  he has had a positive C difficile test before, August 2019.  White blood cells remain depressed at 0.1, platelets are depressed at 91,000, creatinine 1.6.  CT scan of the abdomen obtained last night shows severe proctocolitis without megacolon. Neupogen has been ordered. He is discouraged from eating by severe cramps.      10/17/2020 tmax is improved. Continues to be neutropenic Continues to have cramping, intermittent, abdominal pain. + diarrhea "lost count how many" He had refused vancomycin in am but decided to take it now  10/18/2020 afebrile,(103 on 16th)  ANC improved on granix 0---1029) he is feeling much better today.  Less abdominal pain.  Less loose stools.  10/19/2020.  Afebrile.  T-max 99°.  Less need for pressors.  WBC 5.  Creatinine slightly worse at 1.9.  Discussed with nurse.  Patient may have had some hallucinations earlier  10/20/2020.  Afebrile. Less abdominal pain. Less diarrhea. He feels his abdomen is still distended and does not allow him to take a deep breath. KUB  Is read " as below, I feel that transvere colon is a little bigger today. Creatinine has worsened. Bicarb is 9. Nephrologist is giving bicarb drip.  Las Vancomycin iv and cefepime were  given on 18   Tolerating vancomycin by mouth and metronidazole    10/21: interim reviewed d/w Dr. Jiménez. Requiring bicarb drip. WBC 14 after neupogen, platelets better. Cr stable. Severe hypoalbuminemia. KUB not ominous, CXR with blunted left CPA. Wife reports he is sleeping all of the time and not eating. I aroused him and he agreed to eat some pudding  10/22: interim reviewed. Renal function slightly worse, bicarb corrected to 18 on drip. Urine output is poor and incontinent, midodrine started. Not eating.   10/23:  Interim reviewed, afebrile, oxygenating normally, off norepinephrine since administration of my do drain.  Stools becoming more solid, 2 measured since 0 700, still having incontinence.  Able to do some physical therapy exercises but physical therapy recommends LTAC/rehab/SNF.  Urine output is good, creatinine has stabilized 2.8, white blood cells normal.  CO2 still low at 15.  No new imaging. Slept during visit, discussed with RN and wife.   10/24:  Afebrile, oxygenating well, sleeping and not eating, urine output is very good, creatinine improved, stools with improving form  10/25: low grade temp this afternoon. No stools recorded yet but nursing reports 10 small stools. Patient reports urinary incontinence but waiting until urge before trying to void. Offered condom catheter.. Creatinine a little better. No appetite. Very irritable about being pushed to eat.  10/26: afebrile. Continues to have incontinent loose stools. Declining much physical therapy. Referral sent to LTAC. Cr better. Questran was discontinued, not sure why. He is asleep and I did not disturb, but spoke with his brother.   10/27:alert and interactive this evening. After discussion, he intends to try to eat(he fears cramping) and get up to a chair tomorrow. D/w  patient and wife about LTAC. Discouraged going home with TPN.   10/29: no fever, sats good, 550 cc of stool recorded for yesterday, white blood cells normal, hemoglobin a little lower, creatinine is 2, urinalysis had 50 white cells, urine culture pending but this was obtained from a urinal and not as a clean-catch specimen and will be canceled., blood culture negative so far.  He ate less than 50% of his breakfast and has not been up to the chair as of 11:00 a.m..  He is agreeable to change anti spasmodic.  P.r.n.  10/30: afebrile. Grossly bloody stools in flexiseal. hgb did drop from 9.2 to 8.2. EGD and FFS tomorrow. first urine culture was taken from urinal. Second one obtained by proper method(after d/w RN). Both have pseudomonas. hehas no tenderness over transplant kidney and no dysuria. Blood culture taken from port last pm has GPC. Changing ijmenez tonight and repeat culture. He states he is eating more but this is not accurate.    Antibiotics (From admission, onward)    Start     Stop Route Frequency Ordered    10/30/20 2100  mupirocin 2 % ointment      -- Top 2 times daily 10/30/20 1855    10/30/20 2100  fidaxomicin tablet 200 mg      11/09 2059 Oral 2 times daily 10/30/20 1903    10/30/20 2100  vancomycin 1.5 g in dextrose 5 % 250 mL IVPB (ready to mix)      -- IV Once 10/30/20 1950    10/30/20 2045  cefepime in dextrose 5 % 1 gram/50 mL IVPB 1 g      -- IV Every 12 hours (non-standard times) 10/30/20 1934    10/30/20 2045  metronidazole IVPB 500 mg      -- IV Every 8 hours (non-standard times) 10/30/20 1934    10/30/20 2035  vancomycin - pharmacy to dose  (vancomycin IVPB)      -- IV pharmacy to manage frequency 10/30/20 1935             EXAM & DIAGNOSTICS REVIEWED:   Vitals:     Temp:  [96.9 °F (36.1 °C)-99.4 °F (37.4 °C)]   Temp: 97 °F (36.1 °C) (10/30/20 1527)  Pulse: 64 (10/30/20 1527)  Resp: 18 (10/30/20 1527)  BP: 112/65 (10/30/20 1527)  SpO2: 96 % (10/30/20 1946)    Intake/Output Summary (Last 24  hours) at 10/30/2020 2102  Last data filed at 10/30/2020 1800  Gross per 24 hour   Intake 2278.13 ml   Output 2750 ml   Net -471.87 ml        General:  In NAD.  Alert and attends and is appropriate    Eyes:   anicteric, EOMI  ENT:   no oral lesions  Neck:  Supple   Lungs: Clear, no consolidation, rales, wheezes, rub  Heart:  RRR, no gallop/murmur/rub noted  Abd:  Soft, no guarding, active BS, no masses or organomegaly appreciated.  No tenderness. Rectal tube in place with liquid stool and gross dark blood  :  Voids   no flank tenderness  Musc:  Joints without effusion, swelling, erythema, synovitis,    Skin:  No rashes.    Wound:   Neuro: Alert, conversant, non focal.    Psych:    more engaged in his own care.    Extrem: generalized edema, no erythema, phlebitis, cellulitis, warm and well perfused  VAD:  portacath without redness, drainage, due for site care 10/30    Isolation:  Special contact         General Labs reviewed:  Recent Labs   Lab 10/29/20  0444 10/30/20  0448 10/30/20  1733   WBC 5.73 5.29 3.99   HGB 9.3* 9.2* 8.2*   HCT 28.5* 27.7* 25.7*    202 181       Recent Labs   Lab 10/28/20  0433 10/29/20  0444 10/30/20  0448    136 137   K 3.2* 3.2* 3.3*   * 115* 115*   CO2 12* 11* 12*   BUN 36* 39* 38*   CREATININE 2.2* 2.0* 1.8*   CALCIUM 7.6* 7.4* 7.4*     Micro:  Microbiology Results (last 7 days)     Procedure Component Value Units Date/Time    Blood culture [[id number]] Collected: 10/30/20 2022    Order Status: Sent Specimen: Blood Updated: 10/30/20 2022    Blood culture [[id number]] Collected: 10/28/20 1728    Order Status: Completed Specimen: Blood Updated: 10/30/20 1925     Blood Culture, Routine Gram stain aer bottle: Gram positive cocci in clusters resembling Staph       Results called to and read back by: Fifi Li, Charge Nurse       10/30/2020  19:25    Narrative:      DRAW FROM PORTACATH    Stool culture [[id number]]     Order Status: No result Specimen: Stool     Urine  Culture High Risk [[id number]]  (Abnormal) Collected: 10/29/20 1744    Order Status: Completed Specimen: Urine, Clean Catch Updated: 10/30/20 1700     Urine Culture, Routine GRAM NEGATIVE DESI  >100,000 cfu/ml  Identification and susceptibility pending      Narrative:      Indicated criteria for high risk culture:->Other  Other (specify):->transplant pt    Urine culture [[id number]]  (Abnormal) Collected: 10/28/20 1831    Order Status: Completed Specimen: Urine Updated: 10/30/20 1231     Urine Culture, Routine PSEUDOMONAS AERUGINOSA  > 100,000 cfu/ml  Susceptibility pending      Narrative:      Specimen Source->Urine        Imaging Reviewed:   KUBThree round radiodensities overlie the left upper quadrant may represent ingested pills or be in the patient's clothing.  A radiodense thread is noted over the right lower quadrant of uncertain significance.   CXR clear   CT abdomen and pelvis 10/16 :    Findings consistent with severe proctocolitis.    Transplanted kidney right side of the pelvis with mild pelvocaliectasis and perinephric stranding nonspecific.  No calcified stones seen Additional findings as detailed above including cystic lesion with in the native right kidney versus dilatation of collecting structure of the native right kidney.  Stones in the native right kidney.  Cystic lesion within the pancreas  Cardiology:    IMPRESSION & PLAN   1. Severe C. Difficile colitis, improved, but still having diarrhea despite aggressive treatment and questran   Prompted by oral antibiotics and/or chemotherapy   History of Cdiff 8/2019   Metabolic acidosis,improved    2. PTLD, EBV related lymphoma, receiving chemotherapy      neutropenia, resolved  3. S/p renal transplant on immunosuppression, [facility], Cr   improving  4. Chronic diarrhea  5. History of recurrent UTIs  6. Anorexia  7. Pseudomonas UTI 10/29  8. GI bleeding 10/30  9. Blood culture with GPC 10/29, significance unknown    Recommendations:    Will have to treat the  UTI, cefepime  Will repeat blood culture from PAC with new jimenez needle and start IV vancomycin  IV flagyl (untl discharge) and    switching to Dificid    TID cholestyramine(  and probiotic     .  out of bed as able  EGD and FFS tomorrow  Serial H/H  D/w Dr. Garcia

## 2020-10-31 NOTE — PLAN OF CARE
Pt is hypoactive, poor appetite during shift, tolerated oral fluids well, iv nutrition continued, iv access maintained, contact isolation precautions maintained, flex seal changed during shift due inadvertently being removed,pt kept npo after 0000, stool sample sent to lab, pt and wife aware of plan for egd in morning [id number] per gastro,  pt rested throughout shift, will continue to monitor,.

## 2020-11-01 PROBLEM — R00.0 TACHYCARDIA: Status: ACTIVE | Noted: 2020-01-01

## 2020-11-01 NOTE — HPI
This  is a   70 y.o. male  with multiple medical problems.  Pt has history of renal transplant.  He is currently undergoing chemo for diffuse large B cell lymphoma.  He presented to the ER on 10/15 with one week history of abd pain, crampy in nature, with associated diarrhea..  At that time he was neutropenic and started on abx.  He has suffered with chronic UTI secondary to bladder surgery as a child.  At admission on 10/15 he had ct scan demonstrating severe proctocolitis.  Pt ultimately tested positive for c. Difficile.  Pt initially admitted to ICU.  He reportedly responded well to initial treatement which included iv vanc, vanc enemas, and diflicid.  OVer the last 72 hours he has had worsening of his symptoms prompting transfer to the ICU.  He began bleeding from the rectum.  Hgb decreased to 6.8 yesterday prompting transfusion of 3 U PRBC.  Pt eventually had EGD/Fleg sig yesterday demonstrating pseudomembranous colitis throughout.  Large amount of blood noted in colon.  No definite source.  Pt seen by Dr Tolbert yesterday evening who recommended TAC las night vs transfer to the [facility].  I received information of the consult this AM.  Pt remains tachycardic with blood coming from flexiseal in his rectum.  Pt with abdominal pain.  He appears frail weak andcachetic.  Pt with antibodies which makes blood availability a concern.  Attempts to transfer the pt to [facility] this AM were unsuccessful secondary to bed availability.

## 2020-11-01 NOTE — CARE UPDATE
11/01/20 0727   Patient Assessment/Suction   Level of Consciousness (AVPU) alert   Respiratory Effort Unlabored;Normal   PRE-TX-O2   O2 Device (Oxygen Therapy) room air   SpO2 100 %   Pulse Oximetry Type Continuous   $ Pulse Oximetry - Multiple Charge Pulse Oximetry - Multiple   Pulse (!) 119   Resp (!) 21   Aerosol Therapy   $ Aerosol Therapy Charges PRN treatment not required

## 2020-11-01 NOTE — ASSESSMENT & PLAN NOTE
Give supplement and monitor level.    Potassium   Date Value Ref Range Status   10/31/2020 3.7 3.5 - 5.1 mmol/L Final   10/30/2020 3.3 (L) 3.5 - 5.1 mmol/L Final

## 2020-11-01 NOTE — OP NOTE
Date of surgery:  November 1, 2020    Staff surgeon:  Dr. Roger Guerrero    Assistant:Haylie Queen RNFA    Preoperative diagnosis:  C diff pseudomembrane colitis  GI bleed    Postoperative diagnosis:  Pseudomembranous colitis  Cholecystitis    Procedures:  Exploratory laparotomy with extensive lysis of adhesions  Total abdominal colectomy with end ileostomy (22 modifier)  Cholecystectomy  Small-bowel resection      Anesthesia:  General endotracheal    Blood loss:  Approximately 1 L    Products given:  During surgery patient received 2 units of packed red blood cells.  1 unit of FFP and a unit of platelet    Indication for procedure:  This is a pleasant 70-year-old gentleman with multiple medical problems.  Patient severely ill secondary to uncontrolled C diff colitis.  Over the past 4 days he has had bleeding from the rectum controlled with a flexi Seal.  His hemoglobin dropped to 6.5 yesterday leading to flexible sigmoidoscopy which demonstrated pseudomembranous colitis.  I received a consult this morning for evaluation of pseudomembranous colitis with recommendations of total abdominal colectomy from Infectious Disease.  Patient had failed medical therapy and was in need of surgical treatment.  Patient is high risk for surgery given his multiple comorbidities in particular his lymphoma currently being treated, malnutrition, history of renal transplant and blood antibodies.  There was concern about blood products being available and recommendation initially was to proceed with transfer to the [facility].  This was also felt appropriate given his extensive surgical history as well as renal transplant and chemotherapy which she receives there.  Unfortunately [facility] was on diversion and given persistent bleeding and hemodynamic instability decision was made to proceed with surgery today.    Description of procedure:  Informed consent was obtained from patient's daughter who is his power of .  Patient is  taken to the operating room placed supine position.  General endotracheal anesthesia was administered after placement of an A-line.  A left external jugular IV was placed by Anesthesia.  The abdomen was prepped and draped in standard fashion.  An appropriate time-out procedure was then performed.  A generous midline incision was made through his old scar.  Dissection was carried down to the fascia and abdominal cavity was entered.  There are multiple adhesions to the anterior abdominal wall as well as interloop adhesions.  There adhesions densely stuck to the midline incision in which an enterotomy is made.  Over an hour is taken to freely mobilize the small intestine free all interloop adhesions and adhesions from the anterior abdominal wall.  Patient also has adhesions down to the pelvis and significant inflammation adhesions to the sigmoid colon and descending colon.  Having performed lysis of adhesions and identify the bowel.  Beginning on the right side isolate the terminal ileum staple across terminal ileum with a DAVDI staple device.  Next I ligate the ileocolic artery without event.  I used LigaSure to take down mesenteric attachments of the ascending colon and hepatic flexure.  Care was taken to avoid injury to the gallbladder which was noted to be profoundly distended and adherent to the transverse colon.  Hepatic flexure was taken down transverse colon was mobilized.  The colectomy portion of the case was made further difficult by the fact the patient had a previous gastrojejunostomy with anti colic approach.  This made mobilization of the transverse colon extremely difficult.  Colectomy is also made difficult by the profound colitis and inflammation of the retroperitoneum particular along the left side in the descending and sigmoid colon.  This had extensive time and difficulty to the case and for this reason a 22 modifier should be added.  I continued the mobilization of the transverse colon up to the  gastrojejunostomy.  I next turned my attention to the pelvis and mobilize the small bowel up out of the pelvis.  Picked a point of distal resection in the proximal rectum.  The proximal rectum was divided with a contour stapler.  I next ligate the MONTEZ uneventfully.  I carefully bluntly separate the sigmoid and descending colon from its retroperitoneal attachments.:  As per family thickened and inflamed secondary to chronic colitis.  Ultimately to mobilize this.  The remaining mesentery was taken down with the LigaSure device.  The splenic flexure was next mobilized fully.  I then free the attachments from behind the gastrojejunostomy and ligate the remaining mesenteric attachments.  The middle colic vessel was ligated with a silk ties.  Colon was then removed and sent off the field to pathology.  As mentioned the gallbladder was profoundly distended inflamed.  Given the degree of distention it was felt that proceeding with a cholecystectomy was warranted.  I proceed with a fundus to infundibulum dissection of the gallbladder  it from the hepatic fossa uneventfully.  The cystic artery was identified and ligated without event.  Cystic duct was then ligated with silk ties uneventfully.  Gallbladder was removed off the field.  As mentioned there was 1 loop of small intestine in which multiple enterotomies were made during dissection freeing it from the anterior abdominal wall.  In this area there are also multiple mesenteric defect.  Rather than primary closure decision is made to resect approximately 6 and segment of small bowel.  The mesentery in this area was ligated with a LigaSure device.  An enterotomy was made in the proximal distal limb.  Stapled side-to-side anastomosis was created.  Common enterotomy was then stapled across.  Staple lines were oversewn.  Stitch was placed in the crotch of the anastomosis.  I next irrigated with copious amounts of fluid and ensure adequate hemostasis.  Better Surgicel  was placed in left upper quadrant and well as the right upper quadrant in the pelvis.  A pick a point to place a ileostomy in the right mid abdomen.  Circular incision was made the skin I dissected subcutaneous tissue down to the fascia which was sharply divided.  I bluntly separate the underlying rectus muscle and divided the posterior she such that 2 fingers were passed.  I externalized the end ileostomy.  I next placed drains in the right upper quadrant left upper quadrant along the pericolic gutters.  Next the fascia was closed a running looped PDS.  Skin incision was stapled shut.  I mature an end ileostomy uneventfully.  Patient was extubated in the operating room and taken to the ICU in critical condition.  Patient did have approximately 1 L blood loss.  He did receive 2 units of blood throughout the surgery.  He also received 1 year of FFP and a unit of platelets.

## 2020-11-01 NOTE — PROGRESS NOTES
"[facility] Hospital Medicine  Progress Note    Patient Name: Alin Burkett  MRN: [id number]  Patient Class: IP- Inpatient   Admission Date: 10/15/2020  Length of Stay: 16 days  Attending Physician: Brennan Decker MD  Primary Care Provider: Carmen Krueger MD        Subjective:     Principal Problem:Lower GI bleeding        HPI:  Patient has been having diarrhea with "jelly-like" consistency as well as crampiness in the abdomen.  Symptoms began roughly one week ago.  Also has had fever and malaise.  Appetite poor.  Fatigue as well.  He has been receiving chemo for PTLD; most recent cycle of CHOP was end of last week.  He has history of renal transplant four years ago and is currently on twice-a-day  Prograf.  He's had no cough, no unusual headache, no sinus pain, and no burning on urination.  He feels that he's probably dehydrated.    Overview/Hospital Course:  No notes on file    Interval History:  Continues with hematochezia.  Had scopes done today, which show severe Cdiff colitis.  Transfused RBC today.    Review of Systems   Constitutional: Positive for fatigue and fever. Negative for chills.   Respiratory: Negative for cough, shortness of breath and wheezing.    Cardiovascular: Negative for chest pain and leg swelling.   Gastrointestinal: Positive for abdominal pain and blood in stool. Negative for nausea.     Objective:     Vital Signs (Most Recent):  Temp: 98.3 °F (36.8 °C) (10/31/20 2000)  Pulse: (!) 115 (10/31/20 2100)  Resp: (!) 22 (10/31/20 2100)  BP: (!) 166/94 (10/31/20 2100)  SpO2: 99 % (10/31/20 1906) Vital Signs (24h Range):  Temp:  [97.2 °F (36.2 °C)-100.5 °F (38.1 °C)] 98.3 °F (36.8 °C)  Pulse:  [100-127] 115  Resp:  [18-32] 22  SpO2:  [96 %-100 %] 99 %  BP: ()/() 166/94     Weight: 80.1 kg (176 lb 9.4 oz)  Body mass index is 24.63 kg/m².    Intake/Output Summary (Last 24 hours) at 10/31/2020 2120  Last data filed at 10/31/2020 1721  Gross per 24 hour   Intake " 2312.38 ml   Output 925 ml   Net 1387.38 ml      Physical Exam  Vitals signs reviewed.   Constitutional:       General: He is not in acute distress.     Appearance: He is ill-appearing. He is not diaphoretic.   HENT:      Mouth/Throat:      Pharynx: No oropharyngeal exudate.   Eyes:      General: No scleral icterus.        Right eye: No discharge.         Left eye: No discharge.   Neck:      Vascular: No JVD.   Cardiovascular:      Rate and Rhythm: Normal rate and regular rhythm.   Pulmonary:      Effort: Pulmonary effort is normal.      Breath sounds: Normal breath sounds.   Abdominal:      General: Bowel sounds are normal. There is no distension.      Palpations: Abdomen is soft.      Tenderness: There is abdominal tenderness in the epigastric area and left lower quadrant.   Skin:     General: Skin is warm.      Findings: No rash.   Neurological:      Mental Status: He is alert.         Significant Labs: All pertinent labs within the past 24 hours have been reviewed.    Significant Imaging: I have reviewed all pertinent imaging results/findings within the past 24 hours.      Assessment/Plan:      * Lower GI bleeding  Colonoscopy showed extensive Cdiff colitis with bleeding.  Monitor HGB.  Transfuse as needed.  Stop aspirin.        Hypotension  BP in acceptable range.  Will continue midodrine      Moderate malnutrition  Nutrition consulted. Body mass index is 24.63 kg/m².. Encourage maximal PO intake. Diet supplementation ordered per nutrition approval. Will encourage PO and monitor closely for weight changes.  We are supplementing calorie intake through parenteral nutrition.      Anemia due to chemotherapy  Patient's anemia is currently uncontrolled.  There is anemia of acute blood loss on top of anemia from chemo.  Current CBC reviewed-   Lab Results   Component Value Date    HGB 6.7 (L) 10/31/2020    HCT 21.6 (L) 10/31/2020     Monitor serial CBC and transfuse if patient becomes hemodynamically unstable,  symptomatic or H/H drops below 7/21.         Hypokalemia due to excessive gastrointestinal loss of potassium  Give supplement and monitor level.    Potassium   Date Value Ref Range Status   10/31/2020 3.7 3.5 - 5.1 mmol/L Final   10/30/2020 3.3 (L) 3.5 - 5.1 mmol/L Final         C. difficile colitis  ID and GI services following.  On multiple antimicrobials.  Endoscopic exam of colon reveals mucosal abnormality c/w Cdiff.  ID consultant concerned about persistent bleeding from mucosa, and the disease is probably refractory to treatment.  FMT is a consideration, but FDA may have restrictions against its use at this time.  Subtotal colectomy might be necessary.  I will consult with CRS for opinion.    Post-transplant lymphoproliferative disorder (PTLD)  Has been receiving chemo for this.      Acute renal failure superimposed on stage 3 chronic kidney disease  Improving.  Continue monitoring Cr.  Keep MAP > 55.    Lab Results   Component Value Date    CREATININE 1.8 (H) 10/31/2020             -donor kidney transplant  Continue Prograf at usual dose.      Acquired hypothyroidism  Continue levothyroxine.    Metabolic acidosis  Due to bicarb loss through GI tract.  Continue bicarb supplement.      VTE Risk Mitigation (From admission, onward)         Ordered     IP VTE LOW RISK PATIENT  Once      10/15/20 2220                Discharge Planning   TRINH: 10/26/2020     Code Status: Full Code   Is the patient medically ready for discharge?: Yes    Reason for patient still in hospital (select all that apply): Patient trending condition, Laboratory test, Treatment and Consult recommendations  Discharge Plan A: Home with family            Brennan Decker MD  Department of Hospital Medicine   [facility]

## 2020-11-01 NOTE — PLAN OF CARE
Afebrile overnight.  S/p EGD and colonoscopy yesterday.  1000ml bright red stool in flexiseal overnight.  3 units of PRBC's yesterday.  Last HGB at 12.0.  awaiting am lab results.  Denies any pain.  TPN and lipids in progress.  Pt frustrated with multiple po medications.  Urine output of 675ml, dark tea color.  Surgery consult pending.

## 2020-11-01 NOTE — PROGRESS NOTES
" INPATIENT NEPHROLOGY PROGRESS  [facility] NEPHROLOGY    Alin Burkett  11/01/2020    Reason for consultation:  Acute kidney injury     History of Present Illness:  Patient has been having diarrhea with "jelly-like" consistency as well as crampiness in the abdomen.  Symptoms began roughly one week ago.  Also has had fever and malaise.  Appetite poor.  Fatigue as well.  He has been receiving chemo for PTLD; most recent cycle of CHOP was end of last week.  He has history of renal transplant four years ago and is currently on twice-a-day  Prograf.  He's had no cough, no unusual headache, no sinus pain, and no burning on urination.     10/20  Has some diarrhea and abdominal pain.  No nausea, chest pain, sob, new neuro symptoms, new joint pain.  He is very weak.    I told him that he had previously been seen by doctor Shonda and I would call him to see him.  He stated he didn't want to see Dr Merchant and requested that I become his nephrologist.    10/21  Not much change in renal function since yesterday. UOP 1.3L.  Sleeping quietly.  10/23  Sleeping.  1550 urine output.    10/24 VSS, no new complains. Appreciate ID and Heme input.  10/25 VSS, no new complains. sCr is better.  10/26 VSS, no new complains. Labs are acceptable. Lethargic at times.  10/27 VSS, no new complains.  10/28 VSS, no new complains. Needs to eat bettter.  10/29 VSS, no new complains.   10/30 VSS, no new complains. Still having blood-tinged diarrhea, treated for c.diff. On TPN due to very poor oral intake. Dr. Malave will cover after 5pm today and over the weekend.  10/31 sleeping.  AFVSS   11/1  Sleeping.  Hypotensive.  Tm 100.5    Plan of Care:    Acute kidney injury 2/2 c.diff colitis  Kidney transplant in place  --urine sodium was low implicating renal hypoperfusion   --avoid NSAIDS, Sales II inhibitors, and other non-essential nephrotoxic agents  --renal dose medication for crcl 10-50  --keep map above 55  --treat c.diff, appreciate ID " input  --continue IS for kidney transplant.    Nongap metabolic acidosis likely combination of GI losses and renal tubular defect (urine pH inappropriately high)  --recheck urine pH and urine anion gap     PTLD lymphoma, s/p renal transplant  Anemia  --tacrolimus level therapeutic  --appreciate Heme input    Hyponatremia--better  Hypokalemia--replete  --added KCL and bicarb, needs better oral intake, agree with clinimix.  --avoid hypotonic iv piggy backs  --no ssri antidepressants or thiazide diuretics          Thank you for allowing us to participate in this patient's care. We will continue to follow.    Vital Signs:  Temp Readings from Last 3 Encounters:   20 98.5 °F (36.9 °C) (Oral)   10/10/20 98.3 °F (36.8 °C) (Oral)   10/09/20 98.6 °F (37 °C)       Pulse Readings from Last 3 Encounters:   20 (!) 119   10/10/20 95   10/09/20 (!) 56       BP Readings from Last 3 Encounters:   20 (!) 89/57   10/10/20 102/63   10/09/20 115/76       Weight:  Wt Readings from Last 3 Encounters:   10/29/20 80.1 kg (176 lb 9.4 oz)   10/10/20 68.5 kg (151 lb)   10/09/20 66.4 kg (146 lb 6.2 oz)       Past Medical & Surgical History:  Past Medical History:   Diagnosis Date    Acidosis     Adrenal adenoma     Anemia associated with chronic renal failure     Arrhythmia, onset 2015    Awaiting organ transplant status 2013    Basal cell carcinoma 2012    left nasal tip    Blood type B+ 2013    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  Renal allograft biopsy 17 (DIVINE): 21 glomeruli, none globally sclerosed, <5% interstitial fibrosis, no ACR, c4d negative, AVR CCT Type 2 (V1 lesion); plan THYMO     Dissecting aortic aneurysm  (any part), abdominal     Diverticulosis     Encounter for blood transfusion     ESRD (end stage renal disease) 06/16/2010    Febrile neutropenia 10/15/2020    H/O urethral stricture 11/27/2018    H/O: urethral stricture     History of AAA (abdominal aortic aneurysm) repair     History of urethral stricture 12/19/2018    Hypertension     Hypokalemia     Hypothyroidism 1/10/2014    Inguinal hernia bilateral, non-recurrent     Kidney stones     Organ transplant candidate 11/26/2013    Plantar warts 1/10/2014    Recurrent UTI 7/28/2017    S/P kidney transplant     Secondary hyperparathyroidism, renal     Sepsis 10/24/2020    Thyroid disease        Past Surgical History:   Procedure Laterality Date    ABDOMINAL SURGERY      exploratory lapatomy x 2    ABLATION N/A 8/22/2019    Procedure: ABLATION, SVT;  Surgeon: Emerson Mcmanus MD;  Location: [facility] EP LAB;  Service: Cardiology;  Laterality: N/A;  SVT, RFA, CARTO, anes, GP, 323    AORTA - SUPERIOR MESENTERIC ARTERY BYPASS GRAFT      BLADDER NECK RECONSTRUCTION      BLADDER SURGERY      COLONOSCOPY  10/10/2013    Dr. Gutierrez, repeat in 5 years    CYSTOSCOPY      CYSTOSCOPY N/A 12/19/2018    Procedure: CYSTOSCOPY;  Surgeon: Dewey Mann MD;  Location: [facility] OR [address] [place] [address];  Service: Urology;  Laterality: N/A;  45 min    CYSTOURETHROSCOPY WITH DIRECT VISION INTERNAL URETHROTOMY N/A 12/19/2018    Procedure: CYSTOSCOPY, WITH DIRECT VISION INTERNAL URETHROTOMY;  Surgeon: Dewey Mann MD;  Location: [facility] OR [address] [place] [address];  Service: Urology;  Laterality: N/A;    DILATION OF URETHRA N/A 12/19/2018    Procedure: DILATION, URETHRA;  Surgeon: Dewey Mann MD;  Location: [facility] OR [address] [place] [address];  Service: Urology;  Laterality: N/A;    EXCISIONAL BIOPSY N/A 7/13/2020    Procedure: EXCISIONAL BIOPSY- LEFT INGUINAL NODE;  Surgeon: Vlad Epstein MD;  Location: [facility] OR [facility];  Service: General;  Laterality: N/A;    FLEXIBLE CYSTOSCOPY N/A 11/6/2019     Procedure: CYSTOSCOPY, FLEXIBLE;  Surgeon: Dewey Mann MD;  Location: [address];  Service: Urology;  Laterality: N/A;    GASTROJEJUNOSTOMY  ~1997    HEMORRHOID SURGERY      HERNIA REPAIR      INSERTION OF VENOUS ACCESS PORT Left 7/27/2020    Procedure: INSERTION, VENOUS ACCESS PORT;  Surgeon: Vlad Epstein MD;  Location: [address];  Service: General;  Laterality: Left;    KIDNEY TRANSPLANT      LEFT HEART CATHETERIZATION Left 8/20/2019    Procedure: Left heart cath;  Surgeon: Javan Oscar MD;  Location: [facility] CATH/EP LAB;  Service: Cardiology;  Laterality: Left;    LITHOTRIPSY      LYMPH NODE BIOPSY N/A 6/30/2020    Procedure: BIOPSY, LYMPH NODE;  Surgeon: Ella Diagnostic Provider;  Location: [address];  Service: General;  Laterality: N/A;  189 lymph node biopsy /ULTRASOUND    PERCUTANEOUS NEPHROLITHOTRIPSY      right  ESWL  10/31/12    right ESWL  6/26/12    URETHROPLASTY USING PATCH GRAFT N/A 11/6/2019    Procedure: URETHROPLASTY, USING PATCH GRAFT BUCCAL MUCOSA GRAFT;  Surgeon: Dewey Mann MD;  Location: [address];  Service: Urology;  Laterality: N/A;  3 HOURS       Past Social History:  Social History     Socioeconomic History    Marital status: Single     Spouse name: Not on file    Number of children: Not on file    Years of education: Not on file    Highest education level: Not on file   Occupational History     Employer: Disabled   Social Needs    Financial resource strain: Not on file    Food insecurity     Worry: Not on file     Inability: Not on file    Transportation needs     Medical: Not on file     Non-medical: Not on file   Tobacco Use    Smoking status: Former Smoker     Packs/day: 0.50     Years: 40.00     Pack years: 20.00     Types: Cigarettes     Quit date: 6/16/2010     Years since quitting: 10.3    Smokeless tobacco: Never Used   Substance and Sexual Activity    Alcohol use: Yes     Alcohol/week: 3.0 standard drinks     Types:  3 Cans of beer per week     Comment: occasional/social    Drug use: No     Comment: THC in youth    Sexual activity: Yes     Partners: Female     Birth control/protection: None   Lifestyle    Physical activity     Days per week: Not on file     Minutes per session: Not on file    Stress: Not on file   Relationships    Social connections     Talks on phone: Not on file     Gets together: Not on file     Attends Shinto service: Not on file     Active member of club or organization: Not on file     Attends meetings of clubs or organizations: Not on file     Relationship status: Not on file   Other Topics Concern    Not on file   Social History Narrative    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      Current Outpatient Medications on File Prior to Encounter   Medication Sig Dispense Refill    allopurinoL (ZYLOPRIM) 300 MG tablet Take 1 tablet (300 mg total) by mouth once daily. 30 tablet 2    aspirin (ECOTRIN) 81 MG EC tablet Take 1 tablet (81 mg total) by mouth once daily.  0    calcitRIOL (ROCALTROL) 0.5 MCG Cap Take 1 capsule (0.5 mcg total) by mouth once daily. 30 capsule 11    cefpodoxime (VANTIN) 100 MG tablet Take 1 tablet (100 mg total) by mouth every 12 (twelve) hours. 60 tablet 3    COMBIGAN 0.2-0.5 % Drop Place 1 drop into both eyes 2 (two) times a day.       famotidine (PEPCID) 20 MG tablet TAKE 1 TABLET EVERY EVENING (Patient taking differently: Take 20 mg by mouth every evening. ) 90 tablet 3    ketoconazole (NIZORAL) 200 mg Tab TAKE ONE-HALF (1/2) TABLET ONCE DAILY (Patient taking differently: Take 100 mg by mouth once daily. ) 45 tablet 3    levothyroxine (SYNTHROID) 100 MCG tablet TAKE 1 TABLET DAILY (Patient taking differently: Take 100 mcg by mouth before breakfast. Administer on an empty stomach at least 30 minutes before food. If receiving tube feeds, HOLD tube feeds for 1 hour  before and after levothyroxine administration.) 90 tablet 3    magnesium oxide (MAG-OX) 400 mg (241.3 mg magnesium) tablet Take 1 tablet (400 mg total) by mouth once daily. 90 tablet 3    multivitamin (ONE DAILY MULTIVITAMIN) per tablet Take 1 tablet by mouth once daily.      ondansetron (ZOFRAN-ODT) 8 MG TbDL Dissolve 1 tablet (8 mg total) by mouth every 12 (twelve) hours as needed. (Patient taking differently: Take 8 mg by mouth every 12 (twelve) hours as needed (nausea). ) 21 tablet 1    predniSONE (DELTASONE) 50 MG Tab Take 2 tablets (100 mg total) by mouth once daily. Take on days 2-5 of your chemotherapy cycles. 8 tablet 0    sodium bicarbonate 650 MG tablet Take 1 tablet (650 mg total) by mouth 2 (two) times daily. 540 tablet 3    tacrolimus (PROGRAF) 1 MG Cap Take 3 capsules (3 mg total) by mouth every morning AND 2 capsules (2 mg total) every evening. Z94.0/Kidney Transplant on 11/26/16. 150 capsule 11    travoprost (TRAVATAN Z) 0.004 % ophthalmic solution Place 1 drop into both eyes every evening.        Scheduled Meds:   allopurinoL  100 mg Oral Daily    brimonidine-timoloL  1 drop Both Eyes Q12H    calcitRIOL  0.5 mcg Oral Daily    ceFEPime (MAXIPIME) IVPB  1 g Intravenous Q12H    cholestyramine-aspartame  1 packet Oral TID    eravacycline (XERAVA) infusion 250mL  1 mg/kg Intravenous Q12H    ferrous sulfate  325 mg Oral BID    fidaxomicin  200 mg Oral BID    levothyroxine  100 mcg Oral Before breakfast    metronidazole  500 mg Intravenous Q8H    midodrine  10 mg Oral TID    mupirocin   Topical (Top) BID    pantoprazole  40 mg Intravenous BID    potassium chloride  20 mEq Oral TID    sodium bicarbonate  650 mg Oral TID    sucralfate  1 g Oral QID (AC & HS)    tacrolimus  2 mg Oral Daily PM    tacrolimus  3 mg Oral Daily AM    travoprost  1 drop Both Eyes QHS    vancomycin (VANCOCIN) rectal enema  500 mg Rectal Q6H    vancomycin  500 mg Oral Q6H     Continuous Infusions:    "sodium chloride 0.9% 10 mL/hr at 10/31/20 1538    Amino acid 4.25% - dextrose 5% (CLINIMIX-E) solution with additives (1L provides 42.5 gm AA, 50 gm CHO (170 kcal/L dextrose), Na 35, K 30, Mg 5, Ca 4.5, Acetate 70, Cl 39, Phos 15) 85 mL/hr at 10/31/20 2145     PRN Meds:.sodium chloride, sodium chloride, sodium chloride, acetaminophen, albuterol-ipratropium, dicyclomine, hyoscyamine, magnesium sulfate IVPB, magnesium sulfate IVPB, metoclopramide HCl, morphine, morphine, ondansetron, potassium chloride in water **AND** potassium chloride in water **AND** potassium chloride in water, promethazine, sodium chloride 0.9%, Pharmacy to dose Vancomycin consult **AND** vancomycin - pharmacy to dose    Allergies:  Patient has no known allergies.    Past Family History:  Reviewed; refer to Hospitalist Admission Note    Review of Systems:  Review of Systems - All 14 systems reviewed and negative, except as noted in HPI    Physical Exam:    BP (!) 89/57   Pulse (!) 119   Temp 98.5 °F (36.9 °C) (Oral)   Resp (!) 24   Ht 5' 11" (1.803 m)   Wt 80.1 kg (176 lb 9.4 oz)   SpO2 100%   BMI 24.63 kg/m²     General Appearance:    Alert, cooperative, no distress, appears stated age   Head:    Normocephalic, without obvious abnormality, atraumatic   Eyes:    PER, conjunctiva/corneas clear, EOM's intact in both eyes        Throat:   Lips, mucosa, and tongue normal; teeth and gums normal   Back:     Symmetric, no curvature, ROM normal, no CVA tenderness   Lungs:     Clear to auscultation bilaterally, respirations unlabored   Chest wall:    No tenderness or deformity   Heart:    Regular rate and rhythm, S1 and S2 normal, no murmur, rub   or gallop   Abdomen:     Tender to palpation   Extremities:   Extremities normal, atraumatic, no cyanosis or edema   Pulses:   2+ and symmetric all extremities   MSK:   No joint or muscle swelling, tenderness or deformity   Skin:   Skin color, texture, turgor normal, no rashes or lesions   Neurologic:   " CNII-XII intact, normal strength and sensation       Throughout.  No flap     Results:  Lab Results   Component Value Date     11/01/2020    K 3.1 (L) 11/01/2020     (H) 11/01/2020    CO2 10 (L) 11/01/2020    BUN 34 (H) 11/01/2020    CREATININE 1.8 (H) 11/01/2020    CALCIUM 7.3 (L) 11/01/2020    ANIONGAP 7 (L) 11/01/2020    ESTGFRAFRICA 43 (A) 11/01/2020    EGFRNONAA 38 (A) 11/01/2020       Lab Results   Component Value Date    CALCIUM 7.3 (L) 11/01/2020    PHOS 2.8 10/21/2020       Recent Labs   Lab 10/31/20  1241  11/01/20  0424   WBC 3.86*  --  4.09   RBC 2.08*  --   --    HGB 6.7*   < > 10.2*   HCT 21.6*  --   --      --   --    *  --   --    MCH 32.2*  --   --    MCHC 31.0*  --   --     < > = values in this interval not displayed.          I have personally reviewed pertinent radiological imaging and reports.

## 2020-11-01 NOTE — PROGRESS NOTES
Pharmacokinetic Assessment Follow Up: IV Vancomycin    Vancomycin serum concentration assessment(s):    The random level was drawn correctly and can be used to guide therapy at this time. The measurement is within the desired definitive target range of 15 to 20 mcg/mL.    Vancomycin Regimen Plan:    Give vancomycin 500 mg x 1 and Re-dose when the random level is less than 20 mcg/mL, next level to be drawn at 2100 on 11/1    Drug levels (last 3 results):  Recent Labs   Lab Result Units 10/31/20  2008   Vancomycin, Random ug/mL 15.3       Pharmacy will continue to follow and monitor vancomycin.    Please contact pharmacy at extension [phone] for questions regarding this assessment.    Thank you for the consult,   Catherine Yao       Patient brief summary:  Alin Burkett is a 70 y.o. male initiated on antimicrobial therapy with IV Vancomycin for treatment of bacteremia    The patient's current regimen is pulse dosing. Last dose was 1500 mg    Drug Allergies:   Review of patient's allergies indicates:  No Known Allergies    Actual Body Weight:   80.1 kg    Renal Function:   Estimated Creatinine Clearance: 41.3 mL/min (A) (based on SCr of 1.8 mg/dL (H)).,         CBC (last 72 hours):  Recent Labs   Lab Result Units 10/29/20  0444 10/30/20  0448 10/30/20  1733 10/31/20  0004 10/31/20  0656 10/31/20  1241   WBC K/uL 5.73 5.29 3.99 4.51 4.30  4.30 3.86*   Hemoglobin g/dL 9.3* 9.2* 8.2* 7.9* 7.3*  7.3* 6.7*   Hematocrit % 28.5* 27.7* 25.7* 24.5* 22.8*  22.8* 21.6*   Platelets K/uL 196 202 181 190 194  194 153   Gran % % 67.7 61.0 61.0  --  55.0  --    Lymph % % 19.0 27.0 26.0  --  28.0  --    Mono % % 11.0 7.0 5.0  --  7.0  --    Eosinophil % % 0.2 1.0 0.0  --  1.0  --    Basophil % % 0.5 0.0 0.0  --  0.0  --    Differential Method  Automated Manual Manual  --  Manual  --        Metabolic Panel (last 72 hours):  Recent Labs   Lab Result Units 10/29/20  0444 10/30/20  0448 10/31/20  0656   Sodium mmol/L 136 137 134*    Potassium mmol/L 3.2* 3.3* 3.7   Chloride mmol/L 115* 115* 117*   CO2 mmol/L 11* 12* 11*   Glucose mg/dL 110 100 136*   BUN mg/dL 39* 38* 38*   Creatinine mg/dL 2.0* 1.8* 1.8*       Vancomycin Administrations:  vancomycin given in the last 96 hours                     vancomycin (VANCOCIN) 500 mg in sodium chloride 0.9% 100 mL ENEMA (mg) 500 mg Given 10/31/20 2024    vancomycin 25 mg/mL oral solution 500 mg (mg) 500 mg Given 10/31/20 2018    vancomycin 1.5 g in dextrose 5 % 250 mL IVPB (ready to mix) (mg) 1,500 mg New Bag 10/30/20 2025    vancomycin 25 mg/mL oral solution 500 mg (mg) 500 mg Given 10/30/20 1756    vancomycin 25 mg/mL oral solution 125 mg (mg) 125 mg Given 10/30/20 1050     125 mg Given  0611     125 mg Given 10/29/20 2357     125 mg Given  1736     125 mg Given  1059     125 mg Given  0512     125 mg Given  0046     125 mg Given 10/28/20 2026     125 mg Given  1238     125 mg Given  0559     125 mg Given  0034                    Microbiologic Results:  Microbiology Results (last 7 days)       Procedure Component Value Units Date/Time    Clostridium difficile EIA [[id number]] Collected: 10/31/20 1715    Order Status: Sent Specimen: Stool Updated: 10/31/20 1942    Stool culture [[id number]] Collected: 10/31/20 1715    Order Status: Sent Specimen: Stool Updated: 10/31/20 1941    E. coli 0157 antigen [[id number]] Collected: 10/31/20 1715    Order Status: No result Specimen: Stool Updated: 10/31/20 1941    E. coli 0157 antigen [[id number]] Collected: 10/31/20 0050    Order Status: No result Specimen: Stool Updated: 10/31/20 0955    Stool culture [[id number]] Collected: 10/31/20 0050    Order Status: Sent Specimen: Stool Updated: 10/31/20 0955    Urine culture [[id number]]  (Abnormal)  (Susceptibility) Collected: 10/28/20 1831    Order Status: Completed Specimen: Urine Updated: 10/31/20 0937     Urine Culture, Routine PSEUDOMONAS AERUGINOSA  > 100,000 cfu/ml      Narrative:      Specimen Source->Urine    Blood  culture [[id number]]  (Abnormal) Collected: 10/28/20 1728    Order Status: Completed Specimen: Blood Updated: 10/31/20 0809     Blood Culture, Routine Gram stain aer bottle: Gram positive cocci in clusters resembling Staph       Results called to and read back by: Fifi Li, Charge Nurse       10/30/2020  19:25      STAPHYLOCOCCUS SPECIES  Identification and susceptibility pending      Narrative:      DRAW FROM PORTACATH    Blood culture [[id number]] Collected: 10/30/20 2022    Order Status: Completed Specimen: Blood Updated: 10/31/20 0715     Blood Culture, Routine No Growth to date    Narrative:      Change Woods needle and then draw blood culture from the  portacath    Urine Culture High Risk [[id number]]  (Abnormal) Collected: 10/29/20 1744    Order Status: Completed Specimen: Urine, Clean Catch Updated: 10/30/20 1700     Urine Culture, Routine GRAM NEGATIVE DESI  >100,000 cfu/ml  Identification and susceptibility pending      Narrative:      Indicated criteria for high risk culture:->Other  Other (specify):->transplant pt

## 2020-11-01 NOTE — [employer] PATIENT INSTRUCTIONS
Discharge Summary/Instructions after an Endoscopic Procedure  Patient Name: Alin Burkett  Patient MRN: [id number]  Patient YOB: 1950 Saturday, October 31, 2020  Bharath Edwards MD  RESTRICTIONS:  During your procedure today, you received medications for sedation.  These   medications may affect your judgment, balance and coordination.  Therefore,   for 24 hours, you have the following restrictions:   - DO NOT drive a car, operate machinery, make legal/financial decisions,   sign important papers or drink alcohol.    ACTIVITY:  Today: no heavy lifting, straining or running due to procedural   sedation/anesthesia.  The following day: return to full activity including work.  DIET:  Eat and drink normally unless instructed otherwise.     TREATMENT FOR COMMON SIDE EFFECTS:  - Mild abdominal pain, nausea, belching, bloating or excessive gas:  rest,   eat lightly and use a heating pad.  - Sore Throat: treat with throat lozenges and/or gargle with warm salt   water.  - Because air was used during the procedure, expelling large amounts of air   from your rectum or belching is normal.  - If a bowel prep was taken, you may not have a bowel movement for 1-3 days.    This is normal.  SYMPTOMS TO WATCH FOR AND REPORT TO YOUR PHYSICIAN:  1. Abdominal pain or bloating, other than gas cramps.  2. Chest pain.  3. Back pain.  4. Signs of infection such as: chills or fever occurring within 24 hours   after the procedure.  5. Rectal bleeding, which would show as bright red, maroon, or black stools.   (A tablespoon of blood from the rectum is not serious, especially if   hemorrhoids are present.)  6. Vomiting.  7. Weakness or dizziness.  GO DIRECTLY TO THE NEAREST EMERGENCY ROOM IF YOU HAVE ANY OF THE FOLLOWING:      Difficulty breathing              Chills and/or fever over 101 F   Persistent vomiting and/or vomiting blood   Severe abdominal pain   Severe chest pain   Black, tarry stools   Bleeding- more than one  tablespoon   Any other symptom or condition that you feel may need urgent attention  Your doctor recommends these additional instructions:  If any biopsies were taken, your doctors clinic will contact you in 1 to 2   weeks with any results.  - Perform a colonoscopy as previously scheduled.   - Return patient to ICU for ongoing care.   - Continue present medications.   - Use sucralfate suspension 1 gram PO QID.   - Await pathology results.  For questions, problems or results please call your physician - Bharath Edwards MD at Work:  [phone].  [facility], EMERGENCY ROOM PHONE NUMBER: [phone]  IF A COMPLICATION OR EMERGENCY SITUATION ARISES AND YOU ARE UNABLE TO REACH   YOUR PHYSICIAN - GO DIRECTLY TO THE EMERGENCY ROOM.  Bharath Edwards MD  10/31/2020 7:27:01 PM  This report has been verified and signed electronically.  [employer]

## 2020-11-01 NOTE — ASSESSMENT & PLAN NOTE
ID and GI services following.  On multiple antimicrobials.  Endoscopic exam of colon reveals mucosal abnormality c/w Cdiff.  ID consultant concerned about persistent bleeding from mucosa, and the disease is probably refractory to treatment.  FMT is a consideration, but FDA may have restrictions against its use at this time.  Subtotal colectomy might be necessary.  I will consult with CRS for opinion.

## 2020-11-01 NOTE — ASSESSMENT & PLAN NOTE
Patient's anemia is currently controlled. Has not received any PRBCs to date.. Etiology likely d/t  anemia of chronic disease  Current CBC reviewed-   Lab Results   Component Value Date    HGB 6.7 (L) 10/31/2020    HCT 21.6 (L) 10/31/2020     Monitor serial CBC and transfuse if patient becomes hemodynamically unstable, symptomatic or H/H drops below 7/21.

## 2020-11-01 NOTE — TRANSFER OF CARE
"Anesthesia Transfer of Care Note    Patient: Alin Burkett    Procedure(s) Performed: Procedure(s) (LRB):  LAPAROTOMY, EXPLORATORY (N/A)  COLON RESECTION (N/A)  COLECTOMY, TOTAL, ABDOMINAL (N/A)  CHOLECYSTECTOMY (N/A)  CREATION, ILEOSTOMY (Right)    Patient location: ICU    Anesthesia Type: general    Transport from OR: Transported from OR on 6-10 L/min O2 by face mask with adequate spontaneous ventilation. Continuous ECG monitoring in transport. Continuous SpO2 monitoring in transport. Continuos invasive BP monitoring in transport    Post pain: adequate analgesia    Post assessment: no apparent anesthetic complications    Post vital signs: stable    Level of consciousness: awake    Nausea/Vomiting: no nausea/vomiting    Complications: none    Transfer of care protocol was followed      Last vitals:   Visit Vitals  BP (!) 100/56 (BP Location: Left leg, Patient Position: Lying)   Pulse 103   Temp 37.1 °C (98.8 °F) (Oral)   Resp 17   Ht 5' 11" (1.803 m)   Wt 80.1 kg (176 lb 9.4 oz)   SpO2 100%   BMI 24.63 kg/m²     "

## 2020-11-01 NOTE — CONSULTS
[facility]  General Surgery  Consult Note    Patient Name: Alin Burkett  MRN: [id number]  Code Status: Full Code  Admission Date: 10/15/2020  Hospital Length of Stay: 17 days  Attending Physician: Brennan Decker MD  Primary Care Provider: Carmen Krueger MD    Patient information was obtained from patient and ER records.     Inpatient consult to Colorectal Surgery  Consult performed by: Roger Guerrero MD  Consult ordered by: Brennan Decker MD        Subjective:     Principal Problem: Lower GI bleeding    History of Present Illness: This  is a   70 y.o. male  with multiple medical problems.  Pt has history of renal transplant.  He is currently undergoing chemo for diffuse large B cell lymphoma.  He presented to the ER on 10/15 with one week history of abd pain, crampy in nature, with associated diarrhea..  At that time he was neutropenic and started on abx.  He has suffered with chronic UTI secondary to bladder surgery as a child.  At admission on 10/15 he had ct scan demonstrating severe proctocolitis.  Pt ultimately tested positive for c. Difficile.  Pt initially admitted to ICU.  He reportedly responded well to initial treatement which included iv vanc, vanc enemas, and diflicid.  OVer the last 72 hours he has had worsening of his symptoms prompting transfer to the ICU.  He began bleeding from the rectum.  Hgb decreased to 6.8 yesterday prompting transfusion of 3 U PRBC.  Pt eventually had EGD/Fleg sig yesterday demonstrating pseudomembranous colitis throughout.  Large amount of blood noted in colon.  No definite source.  Pt seen by Dr Tolbert yesterday evening who recommended TAC las night vs transfer to the [facility].  I received information of the consult this AM.  Pt remains tachycardic with blood coming from flexiseal in his rectum.  Pt with abdominal pain.  He appears frail weak andcachetic.  Pt with antibodies which makes blood availability a concern.  Attempts to transfer the pt  to [facility] this AM were unsuccessful secondary to bed availability.      No current facility-administered medications on file prior to encounter.      Current Outpatient Medications on File Prior to Encounter   Medication Sig    allopurinoL (ZYLOPRIM) 300 MG tablet Take 1 tablet (300 mg total) by mouth once daily.    aspirin (ECOTRIN) 81 MG EC tablet Take 1 tablet (81 mg total) by mouth once daily.    calcitRIOL (ROCALTROL) 0.5 MCG Cap Take 1 capsule (0.5 mcg total) by mouth once daily.    cefpodoxime (VANTIN) 100 MG tablet Take 1 tablet (100 mg total) by mouth every 12 (twelve) hours.    COMBIGAN 0.2-0.5 % Drop Place 1 drop into both eyes 2 (two) times a day.     famotidine (PEPCID) 20 MG tablet TAKE 1 TABLET EVERY EVENING (Patient taking differently: Take 20 mg by mouth every evening. )    ketoconazole (NIZORAL) 200 mg Tab TAKE ONE-HALF (1/2) TABLET ONCE DAILY (Patient taking differently: Take 100 mg by mouth once daily. )    levothyroxine (SYNTHROID) 100 MCG tablet TAKE 1 TABLET DAILY (Patient taking differently: Take 100 mcg by mouth before breakfast. Administer on an empty stomach at least 30 minutes before food. If receiving tube feeds, HOLD tube feeds for 1 hour before and after levothyroxine administration.)    magnesium oxide (MAG-OX) 400 mg (241.3 mg magnesium) tablet Take 1 tablet (400 mg total) by mouth once daily.    multivitamin (ONE DAILY MULTIVITAMIN) per tablet Take 1 tablet by mouth once daily.    ondansetron (ZOFRAN-ODT) 8 MG TbDL Dissolve 1 tablet (8 mg total) by mouth every 12 (twelve) hours as needed. (Patient taking differently: Take 8 mg by mouth every 12 (twelve) hours as needed (nausea). )    predniSONE (DELTASONE) 50 MG Tab Take 2 tablets (100 mg total) by mouth once daily. Take on days 2-5 of your chemotherapy cycles.    sodium bicarbonate 650 MG tablet Take 1 tablet (650 mg total) by mouth 2 (two) times daily.    tacrolimus (PROGRAF) 1 MG Cap Take 3 capsules (3 mg  total) by mouth every morning AND 2 capsules (2 mg total) every evening. Z94.0/Kidney Transplant on 16.    travoprost (TRAVATAN Z) 0.004 % ophthalmic solution Place 1 drop into both eyes every evening.        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  Renal allograft biopsy 17 (DIVINE): 21 glomeruli, none globally sclerosed, <5% interstitial fibrosis, no ACR, c4d negative, AVR CCT Type 2 (V1 lesion); plan THYMO     Dissecting aortic aneurysm (any part), abdominal     Diverticulosis     Encounter for blood transfusion     ESRD (end stage renal disease) 2010    Febrile neutropenia 10/15/2020    H/O urethral stricture 2018    H/O: urethral stricture     History of AAA (abdominal aortic aneurysm) repair     History of urethral stricture 2018    Hypertension     Hypokalemia     Hypothyroidism 1/10/2014    Inguinal hernia bilateral, non-recurrent     Kidney stones     Organ transplant candidate 2013    Plantar warts 1/10/2014    Recurrent UTI 2017    S/P kidney transplant     Secondary hyperparathyroidism, renal     Sepsis 10/24/2020    Thyroid disease      Past Surgical History:   Procedure Laterality Date    ABDOMINAL SURGERY      exploratory lapatomy x 2    ABLATION N/A 2019    Procedure: ABLATION, SVT;  Surgeon: Emerson Mcmanus MD;  Location:  [facility] EP LAB;  Service: Cardiology;  Laterality: N/A;  SVT, RFA, CARTO, anes, GP, 323    AORTA - SUPERIOR MESENTERIC ARTERY BYPASS GRAFT      BLADDER NECK RECONSTRUCTION      BLADDER SURGERY      COLONOSCOPY  10/10/2013    Dr. Gutierrez, repeat in 5 years    CYSTOSCOPY      CYSTOSCOPY N/A 12/19/2018    Procedure: CYSTOSCOPY;  Surgeon: Dewey Mann MD;  [facility];  Service: Urology;  Laterality: N/A;  45 min    CYSTOURETHROSCOPY WITH DIRECT VISION INTERNAL URETHROTOMY N/A 12/19/2018    Procedure: CYSTOSCOPY, WITH DIRECT VISION INTERNAL URETHROTOMY;  Surgeon: Dewey Mann MD;  [facility];  Service: Urology;  Laterality: N/A;    DILATION OF URETHRA N/A 12/19/2018    Procedure: DILATION, URETHRA;  Surgeon: Dewey Mann MD;  Location: [address];  Service: Urology;  Laterality: N/A;    EXCISIONAL BIOPSY N/A 7/13/2020    Procedure: EXCISIONAL BIOPSY- LEFT INGUINAL NODE;  Surgeon: Vlad Epstein MD;  Location: [address];  Service: General;  Laterality: N/A;    FLEXIBLE CYSTOSCOPY N/A 11/6/2019    Procedure: CYSTOSCOPY, FLEXIBLE;  Surgeon: Dewey Mann MD;  Location: [facility] OR [address] [place] [address];  Service: Urology;  Laterality: N/A;    GASTROJEJUNOSTOMY  ~1997    HEMORRHOID SURGERY      HERNIA REPAIR      INSERTION OF VENOUS ACCESS PORT Left 7/27/2020    Procedure: INSERTION, VENOUS ACCESS PORT;  Surgeon: Vlad Epstein MD;  Location: [address];  Service: General;  Laterality: Left;    KIDNEY TRANSPLANT      LEFT HEART CATHETERIZATION Left 8/20/2019    Procedure: Left heart cath;  Surgeon: Javan Oscar MD;  Location: [facility] CATH/EP LAB;  Service: Cardiology;  Laterality: Left;    LITHOTRIPSY      LYMPH NODE BIOPSY N/A 6/30/2020    Procedure: BIOPSY, LYMPH NODE;  Surgeon: Ella Diagnostic Provider;  [address];  Service: General;  Laterality: N/A;  189 lymph node biopsy /ULTRASOUND    PERCUTANEOUS NEPHROLITHOTRIPSY      right  ESWL   10/31/12    right ESWL  6/26/12    URETHROPLASTY USING PATCH GRAFT N/A 11/6/2019    Procedure: URETHROPLASTY, USING PATCH GRAFT BUCCAL MUCOSA GRAFT;  Surgeon: Dewey Mann MD;  Location: [facility] OR [address] [place] [address];  Service: Urology;  Laterality: N/A;  3 HOURS     Family History     Problem Relation (Age of Onset)    Alcohol abuse Father    Alzheimer's disease Mother    Cancer Brother    Colon cancer Brother    Diabetes Mother    HIV Brother    Kidney disease Paternal Uncle, Cousin    No Known Problems Sister, Daughter, Sister, Brother, Brother    Stroke Maternal Aunt        Tobacco Use    Smoking status: Former Smoker     Packs/day: 0.50     Years: 40.00     Pack years: 20.00     Types: Cigarettes     Quit date: 6/16/2010     Years since quitting: 10.3    Smokeless tobacco: Never Used   Substance and Sexual Activity    Alcohol use: Yes     Alcohol/week: 3.0 standard drinks     Types: 3 Cans of beer per week     Comment: occasional/social    Drug use: No     Comment: THC in youth    Sexual activity: Yes     Partners: Female     Birth control/protection: None     Review of Systems   Constitutional: Positive for activity change, appetite change, fever and unexpected weight change.   HENT: Negative for congestion and ear discharge.    Respiratory: Positive for shortness of breath.    Cardiovascular: Positive for chest pain.   Gastrointestinal: Positive for abdominal pain, anal bleeding and blood in stool.   Psychiatric/Behavioral: Negative for agitation and decreased concentration.     Objective:     Vital Signs (Most Recent):  Temp: 98.5 °F (36.9 °C) (11/01/20 0800)  Pulse: 101 (11/01/20 1150)  Resp: 18 (11/01/20 1150)  BP: (!) 90/59 (11/01/20 1100)  SpO2: 100 % (11/01/20 1150) Vital Signs (24h Range):  Temp:  [97.4 °F (36.3 °C)-99.2 °F (37.3 °C)] 98.5 °F (36.9 °C)  Pulse:  [100-127] 101  Resp:  [16-32] 18  SpO2:  [86 %-100 %] 100 %  BP: ()/() 90/59     Weight: 80.1 kg (176 lb 9.4 oz)  Body mass index is  24.63 kg/m².    Physical Exam  Vitals signs reviewed.   Constitutional:       General: He is in acute distress.      Appearance: He is ill-appearing and toxic-appearing.   HENT:      Head: Normocephalic and atraumatic.      Right Ear: Tympanic membrane normal.      Left Ear: Tympanic membrane normal.   Eyes:      General:         Right eye: No discharge.         Left eye: No discharge.   Cardiovascular:      Rate and Rhythm: Regular rhythm. Tachycardia present.      Pulses: Normal pulses.      Heart sounds: Murmur present. No gallop.    Pulmonary:      Effort: Pulmonary effort is normal.   Abdominal:      General: Abdomen is flat.      Tenderness: There is abdominal tenderness. There is guarding.       Skin:     General: Skin is warm.      Coloration: Skin is not jaundiced.      Findings: No bruising.   Neurological:      Motor: Weakness present.         Significant Labs:  CBC:   Recent Labs   Lab 11/01/20  1121   WBC 4.00   RBC 2.78*   HGB 8.8*   HCT 26.4*   *   MCV 95   MCH 31.7*   MCHC 33.3     CMP:   Recent Labs   Lab 11/01/20  0424 11/01/20  1121     --    CALCIUM 7.3* 6.9*     --    K 3.1*  --    CO2 10*  --    *  --    BUN 34*  --    CREATININE 1.8*  --      Lab Results   Component Value Date    INR 1.2 11/01/2020    INR 1.1 10/30/2020    INR 0.9 06/30/2020       Significant Diagnostics:  Previous ct scan and xray reviewed.    Assessment/Plan:     C. difficile colitis  Pt with c. Difficile colitis which has failed medical therapy.  D/w Dr Tolbert this AM who notes surgical intervention indicated.  I agree with this assessment.  D/w DR Tolbert ileostomy with antegrade enema vs TAC and end ileostomy>  In this situation with continued blood per rectum requiring transfusion feel that TAC is best choice.  Lengthy d/w pt, his wife and daughter.  Given the severity of his c difficile colitis and his other medical comorbidities he is at high risk for intraoperative and perioperative  complication.  These include abscess, bleeding, hernia, wound healing  (hypoalbumiemia),injury to local structures and death.  Stressed with all that he is critically ill with high potential for significant complications.  They express understanding and desire to proceed with surgery      VTE Risk Mitigation (From admission, onward)         Ordered     IP VTE LOW RISK PATIENT  Once      10/15/20 5634                Thank you for your consult. I will follow-up with patient. Please contact us if you have any additional questions.    Roger Guerrero MD  General Surgery  [facility]

## 2020-11-01 NOTE — ANESTHESIA PROCEDURE NOTES
Intubation  Performed by: Peyman Alfaro CRNA  Authorized by: Peyman Alfaro CRNA     Intubation:     Induction:  Rapid sequence induction    Intubated:  Postinduction    Mask Ventilation:  N/a    Attempts:  1    Attempted By:  CRNA    Method of Intubation:  Direct and video laryngoscopy    Blade:  Ayon 4    Laryngeal View Grade: Grade I - full view of chords      Difficult Airway Encountered?: No      Complications:  None    Airway Device:  Oral endotracheal tube    Airway Device Size:  7.5    Style/Cuff Inflation:  Cuffed (inflated to minimal occlusive pressure)    Inflation Amount (mL):  5    Tube secured:  23    Secured at:  The lips    Placement Verified By:  Capnometry    Complicating Factors:  None    Findings Post-Intubation:  BS equal bilateral

## 2020-11-01 NOTE — NURSING
Spoke with patient regarding plan of care. Patient states that he and his wife have been requesting to transfer to [facility], d/t patient's oncology and  Transplant team being there, since admit to [facility]. Patient is requesting that I speak to Dr Decker regarding transfer. Dr Decker notified.

## 2020-11-01 NOTE — ASSESSMENT & PLAN NOTE
Nutrition consulted. Body mass index is 24.63 kg/m².. Encourage maximal PO intake. Diet supplementation ordered per nutrition approval. Will encourage PO and monitor closely for weight changes.  We are supplementing calorie intake through parenteral nutrition.

## 2020-11-01 NOTE — RESPIRATORY THERAPY
10/31/20 1906   Patient Assessment/Suction   Level of Consciousness (AVPU) alert   Respiratory Effort Unlabored   Expansion/Accessory Muscles/Retractions no use of accessory muscles   All Lung Fields Breath Sounds clear   Rhythm/Pattern, Respiratory unlabored   Cough Frequency infrequent   Cough Type good;nonproductive   PRE-TX-O2   O2 Device (Oxygen Therapy) nasal cannula   Flow (L/min) 2   Oxygen Concentration (%) 28   SpO2 99 %   Pulse Oximetry Type Continuous   $ Pulse Oximetry - Multiple Charge Pulse Oximetry - Multiple   Pulse (!) 120   Resp (!) 25   BP (!) 148/90   Aerosol Therapy   $ Aerosol Therapy Charges PRN treatment not required   Ready to Wean/Extubation Screen   FIO2<=50 (chart decimal) 0.28

## 2020-11-01 NOTE — BRIEF OP NOTE
[facility]  Brief Operative Note    SUMMARY     Surgery Date: 11/1/2020     Surgeon(s) and Role:     * Roger Guerrero MD - Primary    Assisting Surgeon: Haylie NICOLE      Pre-op Diagnosis:  C. Difficile colitis]    Post-op Diagnosis:  Post-Op Diagnosis Codes:     Same  Acalculous cholecystitis ]    Procedure(s) (LRB):  LAPAROTOMY, EXPLORATORY (N/A)  COLON RESECTION (N/A)  COLECTOMY, TOTAL, ABDOMINAL (N/A)  CHOLECYSTECTOMY (N/A)  CREATION, ILEOSTOMY (Right)  Lysis of adhesions.    Anesthesia: General    Description of Procedure: Exploratory laparotomy with extensive lysis of adhesions taking over an hour.  Total abdominal colectomy secondary to c. Difficile colitis.  Injury to small bowel during lysis of adhesions requiring small bowel resection with side to side anastomosis.  Pt with profoundly distended gallbladder leading to open cholecystectomy.  Creation of end ileostomy.      Description of the findings of the procedure: see above.       Estimated Blood Loss: 1000 mL    Estimated Blood Loss has been documented.         Specimens:   Specimen (12h ago, onward)    None          [id number]

## 2020-11-01 NOTE — ASSESSMENT & PLAN NOTE
Pt with c. Difficile colitis which has failed medical therapy.  D/w Dr Tolbert this AM who notes surgical intervention indicated.  I agree with this assessment.  D/w DR Tolbert ileostomy with antegrade enema vs TAC and end ileostomy>  In this situation with continued blood per rectum requiring transfusion feel that TAC is best choice.  Lengthy d/w pt, his wife and daughter.  Given the severity of his c difficile colitis and his other medical comorbidities he is at high risk for intraoperative and perioperative complication.  These include abscess, bleeding, hernia, wound healing  (hypoalbumiemia),injury to local structures and death.  Stressed with all that he is critically ill with high potential for significant complications.  They express understanding and desire to proceed with surgery

## 2020-11-01 NOTE — ASSESSMENT & PLAN NOTE
Colonoscopy showed extensive Cdiff colitis with bleeding.  Monitor HGB.  Transfuse as needed.  Stop aspirin.

## 2020-11-01 NOTE — SUBJECTIVE & OBJECTIVE
Interval History:  Continues with hematochezia.  Had scopes done today, which show severe Cdiff colitis.  Transfused RBC today.    Review of Systems   Constitutional: Positive for fatigue and fever. Negative for chills.   Respiratory: Negative for cough, shortness of breath and wheezing.    Cardiovascular: Negative for chest pain and leg swelling.   Gastrointestinal: Positive for abdominal pain and blood in stool. Negative for nausea.     Objective:     Vital Signs (Most Recent):  Temp: 98.3 °F (36.8 °C) (10/31/20 2000)  Pulse: (!) 115 (10/31/20 2100)  Resp: (!) 22 (10/31/20 2100)  BP: (!) 166/94 (10/31/20 2100)  SpO2: 99 % (10/31/20 1906) Vital Signs (24h Range):  Temp:  [97.2 °F (36.2 °C)-100.5 °F (38.1 °C)] 98.3 °F (36.8 °C)  Pulse:  [100-127] 115  Resp:  [18-32] 22  SpO2:  [96 %-100 %] 99 %  BP: ()/() 166/94     Weight: 80.1 kg (176 lb 9.4 oz)  Body mass index is 24.63 kg/m².    Intake/Output Summary (Last 24 hours) at 10/31/2020 2120  Last data filed at 10/31/2020 1721  Gross per 24 hour   Intake 2312.38 ml   Output 925 ml   Net 1387.38 ml      Physical Exam  Vitals signs reviewed.   Constitutional:       General: He is not in acute distress.     Appearance: He is ill-appearing. He is not diaphoretic.   HENT:      Mouth/Throat:      Pharynx: No oropharyngeal exudate.   Eyes:      General: No scleral icterus.        Right eye: No discharge.         Left eye: No discharge.   Neck:      Vascular: No JVD.   Cardiovascular:      Rate and Rhythm: Normal rate and regular rhythm.   Pulmonary:      Effort: Pulmonary effort is normal.      Breath sounds: Normal breath sounds.   Abdominal:      General: Bowel sounds are normal. There is no distension.      Palpations: Abdomen is soft.      Tenderness: There is abdominal tenderness in the epigastric area and left lower quadrant.   Skin:     General: Skin is warm.      Findings: No rash.   Neurological:      Mental Status: He is alert.         Significant Labs:  All pertinent labs within the past 24 hours have been reviewed.    Significant Imaging: I have reviewed all pertinent imaging results/findings within the past 24 hours.

## 2020-11-01 NOTE — ASSESSMENT & PLAN NOTE
Patient's anemia is currently uncontrolled.  There is anemia of acute blood loss on top of anemia from chemo.  Current CBC reviewed-   Lab Results   Component Value Date    HGB 6.7 (L) 10/31/2020    HCT 21.6 (L) 10/31/2020     Monitor serial CBC and transfuse if patient becomes hemodynamically unstable, symptomatic or H/H drops below 7/21.

## 2020-11-01 NOTE — PROGRESS NOTES
[facility]  Patient Name: Alin Burkett  : 1950  Age: 70 y.o.  Sex: male  MRN: [id number]  Admission Date: 10/15/2020  Hospital Length of Stay: 17 days  Code Status: Full Code   Admitting Provider: Brennan Decker MD  Attending Provider: Brennan Decker MD  Primary Care Physician: Carmen Krueger MD  Full Code  Subjective:     Date of Visit: 2020             Principal Problem: Lower GI bleeding    Patient ID:         Alin Burkett is a 70 y.o. 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 ED workup showed pt was neutropenic with WBC 0.2 with fever. Pt was cultured and given fluid resuscitation and vancomycin and cefepime. Stool cultures positive for C. Difficile toxin. CT abd/pelvis without contrast revealed severe proctocolitis without megacolon. Pt remains pancytopenic with most recent WBC 0.11 with ANC 0.0 and H/H 9.6/29.7 and platelets 91. Pt seen at bedside with his wife and states he has chronic throbbing abdominal pain made worse by eating and has had chronic diarrhea. He states he is fatigued and has decreased appetite along with fever/chills and night sweats.    10/19/2020: Pt seen at bedside and appears cachectic. He endorses that he is still having diarrhea, but that it has improved and decreased in frequency and consistency has thickened. Pt states abdominal pain has improved, but it still present. He endorses fatigue. Pt denies hemoptysis, hematochezia, melena, hematuria.    10/20/2020: Pt seen at bedside in ICU. He states that he is feeling less lethargic. He states his diarrhea is improving and that he only has abdominal pain to palpation.    10/21/2020: Pt seen at bedside in ICU NAD. He states his abdominal pain has decreased. He is still having  diarrhea but with less frequency.     10/22/2020: Pt seen at bedside in ICU. Pt states his abdominal pain has decreased and it no longer hurt to palpate his abdomen. Pt states diarrhea is improving.     10/23/2020: Pt seen at bedside in ICU. Pt states he no longer has abdominal pain and that his fatigue is improving and that his stool is thickening in consistency.    10/26/2020: Pt seen at bedside. He states abdominal pain has been intermittent and that he has had worsening of diarrhea over the last 3 days. No other complaints at this time.     10/28/2020: Pt seen at bedside NAD. He states his abdominal pain has decreased. No new complaints at this time.     10/29/2020: Pt seen at bedside NAD. Fatigue baseline. Abdominal pain is improving. Pt states that he is still having diarrhea. Pt has had blood-tinged stool.    10/30/2020: Pt seen at bedside with wife. He states he has had an increase in fatigue and abdominal pain and has had bloody diarrhea. Pt's wife complains that patient still has sutures in place wounds on bilateral forearms.     11/1/20: patient seen at bedside.      Review of Systems   Constitutional: Positive for activity change and fatigue. Negative for chills and fever.   HENT: Negative for mouth sores and trouble swallowing.    Eyes: Negative for photophobia and visual disturbance.   Respiratory: Negative for cough, chest tightness, shortness of breath, wheezing and stridor.    Cardiovascular: Negative for chest pain and leg swelling.   Gastrointestinal: Positive for abdominal distention, abdominal pain and diarrhea (bloody). Negative for constipation, nausea and vomiting.   Musculoskeletal: Negative for arthralgias, back pain and myalgias.   Skin: Negative for color change, pallor, rash and wound.   Neurological: Negative for syncope, speech difficulty and weakness.   Hematological: Negative for adenopathy. Does not bruise/bleed easily.   Psychiatric/Behavioral: Negative for agitation, behavioral  problems, confusion, decreased concentration and dysphoric mood.        ONCOLOGY HISTORY:     1. Monomorphic post-transplant lymphoproliferative disorder              A. 2020: Noticed left inguinal lymphadenopathy after a dog bite (failed to resolve with antibiotics)              B. 2020: Saw Dr. Collins in infectious diseases for inguinal lymphadenopathy - referred for biopsy              C. 2020: Core biopsy of L inguinal lymph node shows B-cell lymphoma of germinal center origin; EBV-positive by LONNIE; morphology nondiagnostic of PTLD vs follicular lymphoma              D. 2020: PET/CT shows left internal iliac chain node measuring 3.3 x 3 cm with SUV max 37; L inguinal node measures 3.2 x 2.4 cm with SUV max 36              E. 2020: Excisional biopsy of left inguinal node shows monomorphic post-transplant lymphoproliferative disorder (DLBCL, GCB 60%, follicular lymphoma, grade 3B 40%); FISH for MYC rearrangement is negative              F. 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  Renal allograft biopsy 17 (DIVINE): 21 glomeruli, none globally sclerosed, <5% interstitial fibrosis, no ACR, c4d negative, AVR CCT Type 2 (V1 lesion); plan THYMO     Dissecting aortic aneurysm (any part), abdominal     Diverticulosis     Encounter for blood transfusion     ESRD (end  stage renal disease) 06/16/2010    Febrile neutropenia 10/15/2020    H/O urethral stricture 11/27/2018    H/O: urethral stricture     History of AAA (abdominal aortic aneurysm) repair     History of urethral stricture 12/19/2018    Hypertension     Hypokalemia     Hypothyroidism 1/10/2014    Inguinal hernia bilateral, non-recurrent     Kidney stones     Organ transplant candidate 11/26/2013    Plantar warts 1/10/2014    Recurrent UTI 7/28/2017    S/P kidney transplant     Secondary hyperparathyroidism, renal     Sepsis 10/24/2020    Thyroid disease        Family History   Problem Relation Age of Onset    Diabetes Mother     Alzheimer's disease Mother     Alcohol abuse Father     HIV Brother     Stroke Maternal Aunt     Kidney disease Paternal Uncle     Kidney disease Cousin     No Known Problems Sister     No Known Problems Daughter     No Known Problems Sister     No Known Problems Brother     No Known Problems Brother     Cancer Brother         thyroid cancer    Colon cancer Brother     Melanoma Neg Hx     Psoriasis Neg Hx     Lupus Neg Hx     Eczema Neg Hx     Colon polyps Neg Hx     Crohn's disease Neg Hx     Ulcerative colitis Neg Hx     Celiac disease Neg Hx        Past Surgical History:   Procedure Laterality Date    ABDOMINAL SURGERY      exploratory lapatomy x 2    ABLATION N/A 8/22/2019    Procedure: ABLATION, SVT;  Surgeon: Emerson Mcmanus MD;  Location: [facility] EP LAB;  Service: Cardiology;  Laterality: N/A;  SVT, RFA, CARTO, anes, GP, 323    AORTA - SUPERIOR MESENTERIC ARTERY BYPASS GRAFT      BLADDER NECK RECONSTRUCTION      BLADDER SURGERY      COLONOSCOPY  10/10/2013    Dr. Gutierrez, repeat in 5 years    CYSTOSCOPY      CYSTOSCOPY N/A 12/19/2018    Procedure: CYSTOSCOPY;  Surgeon: Dewey Mann MD;  Location: [facility] OR [address] [place] [address];  Service: Urology;  Laterality: N/A;  45 min    CYSTOURETHROSCOPY WITH DIRECT VISION INTERNAL URETHROTOMY N/A 12/19/2018     Procedure: CYSTOSCOPY, WITH DIRECT VISION INTERNAL URETHROTOMY;  Surgeon: Dewey Mann MD;  [facility];  Service: Urology;  Laterality: N/A;    DILATION OF URETHRA N/A 12/19/2018    Procedure: DILATION, URETHRA;  Surgeon: Dewey Mann MD;  [facility];  Service: Urology;  Laterality: N/A;    EXCISIONAL BIOPSY N/A 7/13/2020    Procedure: EXCISIONAL BIOPSY- LEFT INGUINAL NODE;  Surgeon: Vlad Epstein MD;  Location: [address];  Service: General;  Laterality: N/A;    FLEXIBLE CYSTOSCOPY N/A 11/6/2019    Procedure: CYSTOSCOPY, FLEXIBLE;  Surgeon: Dewey Mann MD;  Location: [address];  Service: Urology;  Laterality: N/A;    GASTROJEJUNOSTOMY  ~1997    HEMORRHOID SURGERY      HERNIA REPAIR      INSERTION OF VENOUS ACCESS PORT Left 7/27/2020    Procedure: INSERTION, VENOUS ACCESS PORT;  Surgeon: Vlad Epstein MD;  Location: [address];  Service: General;  Laterality: Left;    KIDNEY TRANSPLANT      LEFT HEART CATHETERIZATION Left 8/20/2019    Procedure: Left heart cath;  Surgeon: Javan Oscar MD;  [facility] CATH[facility]EP LAB;  Service: Cardiology;  Laterality: Left;    LITHOTRIPSY      LYMPH NODE BIOPSY N/A 6/30/2020    Procedure: BIOPSY, LYMPH NODE;  Surgeon: Ella Diagnostic Provider;  Location: [address];  Service: General;  Laterality: N/A;  189 lymph node biopsy /ULTRASOUND    PERCUTANEOUS NEPHROLITHOTRIPSY      right  ESWL  10/31/12    right ESWL  6/26/12    URETHROPLASTY USING PATCH GRAFT N/A 11/6/2019    Procedure: URETHROPLASTY, USING PATCH GRAFT BUCCAL MUCOSA GRAFT;  Surgeon: Dewey Mann MD;  Location: [address];  Service: Urology;  Laterality: N/A;  3 HOURS       Social History     Socioeconomic History    Marital status: Single     Spouse name: Not on file    Number of children: Not on file    Years of education: Not on file    Highest education level: Not on file   Occupational History     Employer:  Disabled   Social Needs    Financial resource strain: Not on file    Food insecurity     Worry: Not on file     Inability: Not on file    Transportation needs     Medical: Not on file     Non-medical: Not on file   Tobacco Use    Smoking status: Former Smoker     Packs/day: 0.50     Years: 40.00     Pack years: 20.00     Types: Cigarettes     Quit date: 6/16/2010     Years since quitting: 10.3    Smokeless tobacco: Never Used   Substance and Sexual Activity    Alcohol use: Yes     Alcohol/week: 3.0 standard drinks     Types: 3 Cans of beer per week     Comment: occasional/social    Drug use: No     Comment: THC in youth    Sexual activity: Yes     Partners: Female     Birth control/protection: None   Lifestyle    Physical activity     Days per week: Not on file     Minutes per session: Not on file    Stress: Not on file   Relationships    Social connections     Talks on phone: Not on file     Gets together: Not on file     Attends Confucianist service: Not on file     Active member of club or organization: Not on file     Attends meetings of clubs or organizations: Not on file     Relationship status: Not on file   Other Topics Concern    Not on file   Social History Narrative    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       Current Facility-Administered Medications   Medication Dose Route Frequency Provider Last Rate Last Dose    0.9%  NaCl infusion (for blood administration)   Intravenous Q24H PRN Gilmer Adler MD        0.9%  NaCl infusion (for blood administration)   Intravenous Q24H PRN Gilmer Adler MD        0.9%  NaCl infusion   Intravenous Continuous Brennan Decker MD 10 mL/hr at 10/31/20 1538      acetaminophen tablet 650 mg  650 mg Oral Q4H PRN Julia Summers MD   650 mg at 10/31/20 0421    albuterol-ipratropium 2.5 mg-0.5 mg/3 mL nebulizer solution 3 mL  3 mL Nebulization Q6H PRN Julia Summers MD   3 mL at 10/20/20 1602    allopurinoL tablet 100 mg  100 mg Oral  Daily Julia Summers MD   Stopped at 10/31/20 0900    Amino acid 4.25% - dextrose 5% (CLINIMIX-E) solution with additives (1L provides 42.5 gm AA, 50 gm CHO (170 kcal/L dextrose), Na 35, K 30, Mg 5, Ca 4.5, Acetate 70, Cl 39, Phos 15)   Intravenous Continuous Brennan Decker MD 85 mL/hr at 10/31/20 2145      brimonidine-timoloL 0.2-0.5 % ophthalmic solution 1 drop  1 drop Both Eyes Q12H Julia Summers MD   1 drop at 10/31/20 2143    calcitRIOL capsule 0.5 mcg  0.5 mcg Oral Daily Julia Summers MD   Stopped at 10/31/20 0900    cefepime in dextrose 5 % 1 gram/50 mL IVPB 1 g  1 g Intravenous Q12H Lola Tolbert  mL/hr at 10/31/20 2105 1 g at 10/31/20 2105    cholestyramine-aspartame 4 gram packet 4 g  1 packet Oral TID Lola Tolbert MD   4 g at 10/31/20 2012    dicyclomine capsule 10 mg  10 mg Oral QID PRN Lola Tolbert MD        eravacycline 80.1 mg in sodium chloride 0.9% 250 mL infusion  1 mg/kg Intravenous Q12H Lola Tolbert  mL/hr at 10/31/20 2359 80.1 mg at 10/31/20 2359    fat emulsion 20% infusion 250 mL  250 mL Intravenous Daily Brennan Decker MD 20.8 mL/hr at 10/31/20 2109 250 mL at 10/31/20 2109    ferrous sulfate EC tablet 325 mg  325 mg Oral BID Julia Summers MD   325 mg at 10/31/20 2012    fidaxomicin tablet 200 mg  200 mg Oral BID Lola Tolbert MD   200 mg at 10/31/20 2023    hyoscyamine ODT 0.125 mg  0.125 mg Sublingual Q4H PRN Julia Summers MD   0.125 mg at 10/17/20 1604    lactated ringers infusion   Intravenous Continuous Bharath Edwards Jr., MD 75 mL/hr at 10/31/20 0749      levothyroxine tablet 100 mcg  100 mcg Oral Before breakfast Julia Summers MD   100 mcg at 11/01/20 0559    magnesium sulfate 2g in water 50mL IVPB (premix)  2 g Intravenous PRN Julia Summers MD        magnesium sulfate 2g in water 50mL IVPB (premix)  4 g Intravenous PRN Julia Summers MD        metoclopramide HCl injection  10 mg  10 mg Intravenous Q6H PRN Julia Summers MD        metronidazole IVPB 500 mg  500 mg Intravenous Q8H Lola Tolbert  mL/hr at 11/01/20 0300 500 mg at 11/01/20 0300    midodrine tablet 10 mg  10 mg Oral TID Julia Summers MD   10 mg at 10/31/20 2013    morphine injection 2 mg  2 mg Intravenous Q4H PRN Julia Summers MD   2 mg at 10/20/20 2213    mupirocin 2 % ointment   Topical (Top) BID Alexi Camp PA-C        ondansetron disintegrating tablet 8 mg  8 mg Oral Q6H PRN Julia Summers MD        pantoprazole injection 40 mg  40 mg Intravenous BID Soraida Garcia MD   40 mg at 10/31/20 2042    potassium chloride 10 mEq in 100 mL IVPB  40 mEq Intravenous PRN Julia Summers  mL/hr at 10/20/20 0729 40 mEq at 10/20/20 0729    And    potassium chloride 10 mEq in 100 mL IVPB  60 mEq Intravenous PRN Julia Summers  mL/hr at 11/01/20 0628 60 mEq at 11/01/20 0628    And    potassium chloride 10 mEq in 100 mL IVPB  80 mEq Intravenous PRN Julia Summers MD        potassium chloride SA CR tablet 20 mEq  20 mEq Oral TID Regan Caldwell MD   20 mEq at 10/31/20 2012    promethazine tablet 25 mg  25 mg Oral Q6H PRN Julia Summers MD        sodium bicarbonate tablet 650 mg  650 mg Oral TID Regan Caldwell MD   650 mg at 10/31/20 2053    sodium chloride 0.9% flush 10 mL  10 mL Intravenous PRN Julia Summers MD        sucralfate 100 mg/mL suspension 1 g  1 g Oral QID (AC & HS) Bharath Edwards Jr., MD   1 g at 11/01/20 0559    tacrolimus capsule 2 mg  2 mg Oral Daily PM Julia Summers MD   2 mg at 10/31/20 2012    tacrolimus capsule 3 mg  3 mg Oral Daily AM Julia Summers MD   Stopped at 10/31/20 0800    travoprost 0.004 % ophthalmic solution 1 drop  1 drop Both Eyes QHS Julia Summers MD   1 drop at 10/31/20 2029    vancomycin (VANCOCIN) 500 mg in sodium chloride 0.9% 100 mL ENEMA  500 mg Rectal Q6H Lola Mares  MD Didi   500 mg at 11/01/20 0131    vancomycin - pharmacy to dose   Intravenous pharmacy to manage frequency Lola Tolbert MD        vancomycin 25 mg/mL oral solution 500 mg  500 mg Oral Q6H Lola Tolbert MD   500 mg at 11/01/20 0129        allopurinoL  100 mg Oral Daily    brimonidine-timoloL  1 drop Both Eyes Q12H    calcitRIOL  0.5 mcg Oral Daily    ceFEPime (MAXIPIME) IVPB  1 g Intravenous Q12H    cholestyramine-aspartame  1 packet Oral TID    eravacycline (XERAVA) infusion 250mL  1 mg/kg Intravenous Q12H    fat emulsion 20%  250 mL Intravenous Daily    ferrous sulfate  325 mg Oral BID    fidaxomicin  200 mg Oral BID    levothyroxine  100 mcg Oral Before breakfast    metronidazole  500 mg Intravenous Q8H    midodrine  10 mg Oral TID    mupirocin   Topical (Top) BID    pantoprazole  40 mg Intravenous BID    potassium chloride  20 mEq Oral TID    sodium bicarbonate  650 mg Oral TID    sucralfate  1 g Oral QID (AC & HS)    tacrolimus  2 mg Oral Daily PM    tacrolimus  3 mg Oral Daily AM    travoprost  1 drop Both Eyes QHS    vancomycin (VANCOCIN) rectal enema  500 mg Rectal Q6H    vancomycin  500 mg Oral Q6H        sodium chloride 0.9% 10 mL/hr at 10/31/20 1538    Amino acid 4.25% - dextrose 5% (CLINIMIX-E) solution with additives (1L provides 42.5 gm AA, 50 gm CHO (170 kcal/L dextrose), Na 35, K 30, Mg 5, Ca 4.5, Acetate 70, Cl 39, Phos 15) 85 mL/hr at 10/31/20 2145    lactated ringers 75 mL/hr at 10/31/20 0749       sodium chloride, sodium chloride, acetaminophen, albuterol-ipratropium, dicyclomine, hyoscyamine, magnesium sulfate IVPB, magnesium sulfate IVPB, metoclopramide HCl, morphine, ondansetron, potassium chloride in water **AND** potassium chloride in water **AND** potassium chloride in water, promethazine, sodium chloride 0.9%, Pharmacy to dose Vancomycin consult **AND** vancomycin - pharmacy to dose    Antibiotics (From admission, onward)    Start     Stop  Route Frequency Ordered    11/01/20 0100  eravacycline 80.1 mg in sodium chloride 0.9% 250 mL infusion      -- IV Every 12 hours (non-standard times) 10/31/20 2155    10/31/20 1900  vancomycin 25 mg/mL oral solution 500 mg  (C. difficile Infection (CDI) Treatment Order Panel)      -- Oral Every 6 hours 10/31/20 1751    10/31/20 1900  metronidazole IVPB 500 mg  (C. difficile Infection (CDI) Treatment Order Panel)      -- IV Every 8 hours (non-standard times) 10/31/20 1751    10/31/20 1900  vancomycin (VANCOCIN) 500 mg in sodium chloride 0.9% 100 mL ENEMA  (C. difficile Infection (CDI) Treatment Order Panel)      -- Rect Every 6 hours 10/31/20 1751    10/30/20 2100  mupirocin 2 % ointment      -- Top 2 times daily 10/30/20 1855    10/30/20 2100  fidaxomicin tablet 200 mg      11/09 2059 Oral 2 times daily 10/30/20 1903    10/30/20 2045  cefepime in dextrose 5 % 1 gram/50 mL IVPB 1 g      -- IV Every 12 hours (non-standard times) 10/30/20 1934    10/30/20 2035  vancomycin - pharmacy to dose  (vancomycin IVPB)      -- IV pharmacy to manage frequency 10/30/20 1935          Review of patient's allergies indicates:  No Known Allergies  All medications and past history have been reviewed.    Objective:       Vitals:    11/01/20 0600   BP: (!) 89/57   Pulse: (!) 117   Resp: (!) 24   Temp:        Last 24 Hours:    Intake/Output Summary (Last 24 hours) at 11/1/2020 0717  Last data filed at 11/1/2020 0600  Gross per 24 hour   Intake 4427 ml   Output 2475 ml   Net 1952 ml     Weight Readings:  Wt Readings from Last 5 Encounters:   10/29/20 80.1 kg (176 lb 9.4 oz)   10/10/20 68.5 kg (151 lb)   10/09/20 66.4 kg (146 lb 6.2 oz)   10/02/20 65.8 kg (145 lb)   09/11/20 68.6 kg (151 lb 2 oz)      Blood Type:  B POS     C diff positive contact precaution  Ill-appearing no acute distress  Normocephalic atraumatic  Tachycardic  Normal respiratory effort  Bowel sounds present abdomen soft  Skin warm and dry  Nonfocal    Labs:    Lab  Results   Component Value Date    WBC 4.09 11/01/2020    HGB 10.2 (L) 11/01/2020    HCT 21.6 (L) 10/31/2020     (H) 10/31/2020     10/31/2020       CMP  Sodium   Date Value Ref Range Status   11/01/2020 136 136 - 145 mmol/L Final     Potassium   Date Value Ref Range Status   11/01/2020 3.1 (L) 3.5 - 5.1 mmol/L Final     Chloride   Date Value Ref Range Status   11/01/2020 119 (H) 95 - 110 mmol/L Final     CO2   Date Value Ref Range Status   11/01/2020 10 (L) 23 - 29 mmol/L Final     Glucose   Date Value Ref Range Status   11/01/2020 109 70 - 110 mg/dL Final     BUN   Date Value Ref Range Status   11/01/2020 34 (H) 8 - 23 mg/dL Final     Creatinine   Date Value Ref Range Status   11/01/2020 1.8 (H) 0.5 - 1.4 mg/dL Final     Calcium   Date Value Ref Range Status   11/01/2020 7.3 (L) 8.7 - 10.5 mg/dL Final     Total Protein   Date Value Ref Range Status   10/20/2020 4.2 (L) 6.0 - 8.4 g/dL Final     Albumin   Date Value Ref Range Status   10/21/2020 1.4 (L) 3.5 - 5.2 g/dL Final     Total Bilirubin   Date Value Ref Range Status   10/20/2020 0.5 0.1 - 1.0 mg/dL Final     Comment:     For infants and newborns, interpretation of results should be based  on gestational age, weight and in agreement with clinical  observations.  Premature Infant recommended reference ranges:  Up to 24 hours.............<8.0 mg/dL  Up to 48 hours............<12.0 mg/dL  3-5 days..................<15.0 mg/dL  6-29 days.................<15.0 mg/dL       Alkaline Phosphatase   Date Value Ref Range Status   10/20/2020 154 (H) 55 - 135 U/L Final     AST   Date Value Ref Range Status   10/20/2020 21 10 - 40 U/L Final     ALT   Date Value Ref Range Status   10/20/2020 18 10 - 44 U/L Final     Anion Gap   Date Value Ref Range Status   11/01/2020 7 (L) 8 - 16 mmol/L Final     eGFR if    Date Value Ref Range Status   11/01/2020 43 (A) >60 mL/min/1.73 m^2 Final     eGFR if non    Date Value Ref Range Status    11/01/2020 38 (A) >60 mL/min/1.73 m^2 Final     Comment:     Calculation used to obtain the estimated glomerular filtration  rate (eGFR) is the CKD-EPI equation.          Imaging:  Results for orders placed or performed during the hospital encounter of 10/15/20 (from the past 2160 hour(s))   CT Abdomen Pelvis  Without Contrast    Impression    Findings consistent with severe proctocolitis.    Transplanted kidney right side of the pelvis with mild pelvocaliectasis and perinephric stranding nonspecific.  No calcified stones seen    Additional findings as detailed above including cystic lesion with in the native right kidney versus dilatation of collecting structure of the native right kidney.  Stones in the native right kidney.  Cystic lesion within the pancreas.    Final read    Virtual Radiology concordant      Electronically signed by: Ada Castillo MD  Date:    10/16/2020  Time:    08:56   Results for orders placed or performed during the hospital encounter of 08/06/20 (from the past 2160 hour(s))   CT Chest Abdomen Pelvis Without Contrast (XPD)    Impression    No acute abdominal pathology identified.    Nonvisualization of the left kidney with malrotation of the right kidney and multiple nonobstructing renal stones.  No hydronephrosis.    Right pelvic renal allograft with no evidence for renal allograft focal lesion, nephrolithiasis, or hydronephrosis.    Stable mild ectasia of the infrarenal abdominal aorta measuring up to 2.6 cm without evidence for aortic aneurysm or other acute aortic pathology.  Atherosclerosis identified.    Multiple enlarged lymph nodes in the left inguinal region, with interval dissection of multiple left inguinal and pelvic lymph nodes when compared to nuclear medicine PET-CT 07/08/2020.  Correlate with biopsy results.    Stable cystic appearing lesion in the pancreatic head measuring 1.2 cm.    Other findings as above.    Electronically signed by resident: Mati  Melia  Date:    08/06/2020  Time:    09:23    Electronically signed by: Jayson Staples MD  Date:    08/06/2020  Time:    10:34     *Note: Due to a large number of results and/or encounters for the requested time period, some results have not been displayed. A complete set of results can be found in Results Review.     PET CT 07/08/2020  FINDINGS:  Quality of the study: Adequate.     In the neck, there is no abnormal hypermetabolic activity worrisome for malignancy.  Vascular stent identified in the left axillary region.  No significant lymphadenopathy.     In the chest, there is no abnormal hypermetabolic activity worsened malignancy.  Coronary atherosclerosis.  0.4 cm pulmonary nodule identified in the left upper lobe (axial series 3, image 67), lesion is too small to characterize with PET-CT. Recommend attenuation expected follow-up examinations. No significant lymphadenopathy.     In the abdomen and pelvis, there is hypermetabolic lymphadenopathy throughout the left internal/external iliac chain and left groin.  New left internal iliac chain node measures 3.3 x 3 0 cm with SUV max of 37 (axial fused image 193).  Left inguinal node measures approximately 3.2 x 2.4 cm with SUV max of 36 (axial fused image 218), previously measured up to 1.4 cm.  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  The right kidney appears atrophic with abnormal contour parenchymal calcifications, unchanged.  Right lower quadrant transplant kidney in place.  Stable small bladder diverticulum.  Stable 1.2 cm pancreatic cyst, better characterized on most recent CT with IV contrast.  Stable bilateral probable adrenal adenomas.  Colonic diverticulosis without evidence of acute diverticulitis.  Stable fusiform abdominal aortic ectasia.     Spleen appears upper limit of normal for size measuring 12.0 cm in craniocaudal dimension.  Normal heterogeneous uptake similar to liver.     In the bones, there is no abnormal activity worrisome for malignancy.   Additional focus of increased uptake identified within the myofascial structures of the thigh, just deep to the left femur with SUV max of 27 (axial fused image 269).     Impression:     Hypermetabolic lymphadenopathy throughout the left iliac chain and left groin with additional hypermetabolic focus in the left thigh.  No hypermetabolic juan david disease above the diaphragm.  The Deauville score is 5.    All lab results and imaging results have been reviewed.    Assessment and Plan:      Present on Admission:   (Resolved) Febrile neutropenia   C. difficile colitis   Acquired hypothyroidism   Post-transplant lymphoproliferative disorder (PTLD)   Acute renal failure superimposed on stage 3 chronic kidney disease   Hypokalemia due to excessive gastrointestinal loss of potassium   Anemia due to chemotherapy   Moderate malnutrition   Metabolic acidosis   (Resolved) Sepsis   Hypotension       Post-transplant lymphoproliferative disorder (PTLD)  > Cycle 4 RCHOP completed on 10/09/2020.  > will need to follow Dr Gould at [facility]   > will continue to follow       C-difficile colitis  > ID and GI following closely.    > Reviewed ID note.  Subtotal colectomy  versus fecal transplant versus [facility] transfer .  Surgical opinion pending   > FMT has been considered but FDA restriction against used at this time.       GI bleed  > colonoscopy 10/31/20 pseudomembranous enterocolitis extensive with active bleeding.    DIVINE  Followed by Nephrology.      Normocytic Anemia with Thrombocytopenia  -Mixed anemia secondary to iron deficiency and chronic disease. Per nursing, pt has had significant amount of blood noted in stool. Continue ferrous sulfate 325mg BID.  -Platelets have recovered. Neutropenia resolved s/p granix 300mcg daily x 3 doses with last dose given 10/18/2020.  > continue trend cbc  > will continue to follow  > Check PTPTTINR and fribrinogen today  > check Calcium as well   > transfuse if Hb < 8 g/dL and Plt  < 85K for possible surgery     Hypokalemia  > replace electrolytes

## 2020-11-01 NOTE — SUBJECTIVE & OBJECTIVE
No current facility-administered medications on file prior to encounter.      Current Outpatient Medications on File Prior to Encounter   Medication Sig    allopurinoL (ZYLOPRIM) 300 MG tablet Take 1 tablet (300 mg total) by mouth once daily.    aspirin (ECOTRIN) 81 MG EC tablet Take 1 tablet (81 mg total) by mouth once daily.    calcitRIOL (ROCALTROL) 0.5 MCG Cap Take 1 capsule (0.5 mcg total) by mouth once daily.    cefpodoxime (VANTIN) 100 MG tablet Take 1 tablet (100 mg total) by mouth every 12 (twelve) hours.    COMBIGAN 0.2-0.5 % Drop Place 1 drop into both eyes 2 (two) times a day.     famotidine (PEPCID) 20 MG tablet TAKE 1 TABLET EVERY EVENING (Patient taking differently: Take 20 mg by mouth every evening. )    ketoconazole (NIZORAL) 200 mg Tab TAKE ONE-HALF (1/2) TABLET ONCE DAILY (Patient taking differently: Take 100 mg by mouth once daily. )    levothyroxine (SYNTHROID) 100 MCG tablet TAKE 1 TABLET DAILY (Patient taking differently: Take 100 mcg by mouth before breakfast. Administer on an empty stomach at least 30 minutes before food. If receiving tube feeds, HOLD tube feeds for 1 hour before and after levothyroxine administration.)    magnesium oxide (MAG-OX) 400 mg (241.3 mg magnesium) tablet Take 1 tablet (400 mg total) by mouth once daily.    multivitamin (ONE DAILY MULTIVITAMIN) per tablet Take 1 tablet by mouth once daily.    ondansetron (ZOFRAN-ODT) 8 MG TbDL Dissolve 1 tablet (8 mg total) by mouth every 12 (twelve) hours as needed. (Patient taking differently: Take 8 mg by mouth every 12 (twelve) hours as needed (nausea). )    predniSONE (DELTASONE) 50 MG Tab Take 2 tablets (100 mg total) by mouth once daily. Take on days 2-5 of your chemotherapy cycles.    sodium bicarbonate 650 MG tablet Take 1 tablet (650 mg total) by mouth 2 (two) times daily.    tacrolimus (PROGRAF) 1 MG Cap Take 3 capsules (3 mg total) by mouth every morning AND 2 capsules (2 mg total) every evening.  Z94.0/Kidney Transplant on 16.    travoprost (TRAVATAN Z) 0.004 % ophthalmic solution Place 1 drop into both eyes every evening.        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  Renal allograft biopsy 17 (DIVINE): 21 glomeruli, none globally sclerosed, <5% interstitial fibrosis, no ACR, c4d negative, AVR CCT Type 2 (V1 lesion); plan THYMO     Dissecting aortic aneurysm (any part), abdominal     Diverticulosis     Encounter for blood transfusion     ESRD (end stage renal disease) 2010    Febrile neutropenia 10/15/2020    H/O urethral stricture 2018    H/O: urethral stricture     History of AAA (abdominal aortic aneurysm) repair     History of urethral stricture 2018    Hypertension     Hypokalemia     Hypothyroidism 1/10/2014    Inguinal hernia bilateral, non-recurrent     Kidney stones     Organ transplant candidate 2013    Plantar warts 1/10/2014    Recurrent UTI 2017    S/P kidney transplant     Secondary hyperparathyroidism, renal     Sepsis 10/24/2020    Thyroid disease      Past Surgical History:   Procedure Laterality Date    ABDOMINAL SURGERY      exploratory lapatomy x 2    ABLATION N/A 2019    Procedure: ABLATION, SVT;  Surgeon: Emerson Mcmanus MD;  Location[facility];  Service: Cardiology;  Laterality: N/A;  SVT, RFA, CARTO,  gios, GP, 323    AORTA - SUPERIOR MESENTERIC ARTERY BYPASS GRAFT      BLADDER NECK RECONSTRUCTION      BLADDER SURGERY      COLONOSCOPY  10/10/2013    Dr. Gutierrez, repeat in 5 years    CYSTOSCOPY      CYSTOSCOPY N/A 12/19/2018    Procedure: CYSTOSCOPY;  Surgeon: Dewey Mann MD;  [facility];  Service: Urology;  Laterality: N/A;  45 min    CYSTOURETHROSCOPY WITH DIRECT VISION INTERNAL URETHROTOMY N/A 12/19/2018    Procedure: CYSTOSCOPY, WITH DIRECT VISION INTERNAL URETHROTOMY;  Surgeon: Dewey Mann MD;  [facility];  Service: Urology;  Laterality: N/A;    DILATION OF URETHRA N/A 12/19/2018    Procedure: DILATION, URETHRA;  Surgeon: Dewey Mann MD;  [facility];  Service: Urology;  Laterality: N/A;    EXCISIONAL BIOPSY N/A 7/13/2020    Procedure: EXCISIONAL BIOPSY- LEFT INGUINAL NODE;  Surgeon: Vlad Epstein MD;  Location: [address];  Service: General;  Laterality: N/A;    FLEXIBLE CYSTOSCOPY N/A 11/6/2019    Procedure: CYSTOSCOPY, FLEXIBLE;  Surgeon: Dewey Mann MD;  Location: [facility] OR [address] [place] [address];  Service: Urology;  Laterality: N/A;    GASTROJEJUNOSTOMY  ~1997    HEMORRHOID SURGERY      HERNIA REPAIR      INSERTION OF VENOUS ACCESS PORT Left 7/27/2020    Procedure: INSERTION, VENOUS ACCESS PORT;  Surgeon: Vlad Epstein MD;  Location: [facility] OR [address] [place] [address];  Service: General;  Laterality: Left;    KIDNEY TRANSPLANT      LEFT HEART CATHETERIZATION Left 8/20/2019    Procedure: Left heart cath;  Surgeon: Javan Oscar MD;  Location: [facility] CATH[facility]EP LAB;  Service: Cardiology;  Laterality: Left;    LITHOTRIPSY      LYMPH NODE BIOPSY N/A 6/30/2020    Procedure: BIOPSY, LYMPH NODE;  Surgeon: [facility] Diagnostic Provider;  [facility];  Service: General;  Laterality: N/A;  189 lymph node biopsy /ULTRASOUND    PERCUTANEOUS NEPHROLITHOTRIPSY      right  ESWL  10/31/12    right ESWL  6/26/12    URETHROPLASTY USING PATCH GRAFT  N/A 11/6/2019    Procedure: URETHROPLASTY, USING PATCH GRAFT BUCCAL MUCOSA GRAFT;  Surgeon: Dewey Mann MD;  Location: [facility] OR [address] [place] [address];  Service: Urology;  Laterality: N/A;  3 HOURS     Family History     Problem Relation (Age of Onset)    Alcohol abuse Father    Alzheimer's disease Mother    Cancer Brother    Colon cancer Brother    Diabetes Mother    HIV Brother    Kidney disease Paternal Uncle, Cousin    No Known Problems Sister, Daughter, Sister, Brother, Brother    Stroke Maternal Aunt        Tobacco Use    Smoking status: Former Smoker     Packs/day: 0.50     Years: 40.00     Pack years: 20.00     Types: Cigarettes     Quit date: 6/16/2010     Years since quitting: 10.3    Smokeless tobacco: Never Used   Substance and Sexual Activity    Alcohol use: Yes     Alcohol/week: 3.0 standard drinks     Types: 3 Cans of beer per week     Comment: occasional/social    Drug use: No     Comment: THC in youth    Sexual activity: Yes     Partners: Female     Birth control/protection: None     Review of Systems   Constitutional: Positive for activity change, appetite change, fever and unexpected weight change.   HENT: Negative for congestion and ear discharge.    Respiratory: Positive for shortness of breath.    Cardiovascular: Positive for chest pain.   Gastrointestinal: Positive for abdominal pain, anal bleeding and blood in stool.   Psychiatric/Behavioral: Negative for agitation and decreased concentration.     Objective:     Vital Signs (Most Recent):  Temp: 98.5 °F (36.9 °C) (11/01/20 0800)  Pulse: 101 (11/01/20 1150)  Resp: 18 (11/01/20 1150)  BP: (!) 90/59 (11/01/20 1100)  SpO2: 100 % (11/01/20 1150) Vital Signs (24h Range):  Temp:  [97.4 °F (36.3 °C)-99.2 °F (37.3 °C)] 98.5 °F (36.9 °C)  Pulse:  [100-127] 101  Resp:  [16-32] 18  SpO2:  [86 %-100 %] 100 %  BP: ()/() 90/59     Weight: 80.1 kg (176 lb 9.4 oz)  Body mass index is 24.63 kg/m².    Physical Exam  Vitals signs reviewed.   Constitutional:        General: He is in acute distress.      Appearance: He is ill-appearing and toxic-appearing.   HENT:      Head: Normocephalic and atraumatic.      Right Ear: Tympanic membrane normal.      Left Ear: Tympanic membrane normal.   Eyes:      General:         Right eye: No discharge.         Left eye: No discharge.   Cardiovascular:      Rate and Rhythm: Regular rhythm. Tachycardia present.      Pulses: Normal pulses.      Heart sounds: Murmur present. No gallop.    Pulmonary:      Effort: Pulmonary effort is normal.   Abdominal:      General: Abdomen is flat.      Tenderness: There is abdominal tenderness. There is guarding.       Skin:     General: Skin is warm.      Coloration: Skin is not jaundiced.      Findings: No bruising.   Neurological:      Motor: Weakness present.         Significant Labs:  CBC:   Recent Labs   Lab 11/01/20  1121   WBC 4.00   RBC 2.78*   HGB 8.8*   HCT 26.4*   *   MCV 95   MCH 31.7*   MCHC 33.3     CMP:   Recent Labs   Lab 11/01/20  0424 11/01/20  1121     --    CALCIUM 7.3* 6.9*     --    K 3.1*  --    CO2 10*  --    *  --    BUN 34*  --    CREATININE 1.8*  --      Lab Results   Component Value Date    INR 1.2 11/01/2020    INR 1.1 10/30/2020    INR 0.9 06/30/2020       Significant Diagnostics:  Previous ct scan and xray reviewed.

## 2020-11-01 NOTE — ASSESSMENT & PLAN NOTE
Improving.  Continue monitoring Cr.  Keep MAP > 55.    Lab Results   Component Value Date    CREATININE 1.8 (H) 10/31/2020

## 2020-11-01 NOTE — PT/OT/SLP DISCHARGE
Physical Therapy Discharge Summary    Name: Alin Burkett  MRN: [id number]   Principal Problem: Lower GI bleeding     Patient Discharged from acute Physical Therapy on 2020.  Please refer to prior PT noted date on 10/28/2020 for functional status.     Assessment:     D/C PT due to transfer to ICU. Very limited participation for few days prior to transfer.    Objective:     GOALS:   Multidisciplinary Problems     Physical Therapy Goals        Problem: Physical Therapy Goal    Goal Priority Disciplines Outcome Goal Variances Interventions   Physical Therapy Goal     PT, PT/OT Ongoing, Progressing     Description: Goals to be met by: 2020     Patient will increase functional independence with mobility by performin. Supine to sit with MInimal Assistance  2. Sit to stand transfer with Minimal Assistance  3. Bed to chair transfer with Minimal Assistance using Rolling Walker  4. Gait  x 250 feet with Minimal Assistance using Rolling Walker.   5. Lower extremity exercise program x20 reps                      Reasons for Discontinuation of Therapy Services  Transfer to alternate level of care.      Plan:     Patient Discharged to: N/A    Carlos Leroy, PT  2020

## 2020-11-02 NOTE — ASSESSMENT & PLAN NOTE
Colonoscopy showed extensive Cdiff colitis with bleeding.  Monitor HGB.  Transfuse as needed.  Surgical treatment is indicated.  Will need colectomy.

## 2020-11-02 NOTE — ASSESSMENT & PLAN NOTE
Continue Prograf at usual dose.  I communicated by secure messaging with Dr. Mcclain, the patient's transplant nephrologist at [facility].  Her recommendation was to keep the trough level around 3 to 4.  Will check Tr level tomorrow morning.

## 2020-11-02 NOTE — PLAN OF CARE
Plan of care is progressing. Surgery at 1230. Returned to room s/p total colectomy and cholecystectomy. Midline abd drsg is CD&I. R RIO drain - 340 output. L RIO drain - 230 output. Dr Guerrero is aware - FFPs ordered. CBC ordered for 2000. 2 units PRBCs on standby. ST - SA on the monitor SBP 140s post op. Now 80-90 systolic - Mitesh ordered. Patient remains drowsy but opens eye with verbal stimulation. Wife updated.

## 2020-11-02 NOTE — CARE UPDATE
11/01/20 1900   PRE-TX-O2   O2 Device (Oxygen Therapy) room air   SpO2 100 %   Pulse Oximetry Type Continuous   $ Pulse Oximetry - Multiple Charge Pulse Oximetry - Multiple   Oximetry Probe Site No Change Needed   Pulse (!) 111   Resp 15   Aerosol Therapy   $ Aerosol Therapy Charges PRN treatment not required   Respiratory Treatment Status (SVN) PRN treatment not required

## 2020-11-02 NOTE — [employer] PATIENT INSTRUCTIONS
Discharge Summary/Instructions after an Endoscopic Procedure  Patient Name: Alin Burkett  Patient MRN: [id number]  Patient YOB: 1950 Saturday, October 31, 2020  Bharath Edwards MD  RESTRICTIONS:  During your procedure today, you received medications for sedation.  These   medications may affect your judgment, balance and coordination.  Therefore,   for 24 hours, you have the following restrictions:   - DO NOT drive a car, operate machinery, make legal/financial decisions,   sign important papers or drink alcohol.    ACTIVITY:  Today: no heavy lifting, straining or running due to procedural   sedation/anesthesia.  The following day: return to full activity including work.  DIET:  Eat and drink normally unless instructed otherwise.     TREATMENT FOR COMMON SIDE EFFECTS:  - Mild abdominal pain, nausea, belching, bloating or excessive gas:  rest,   eat lightly and use a heating pad.  - Sore Throat: treat with throat lozenges and/or gargle with warm salt   water.  - Because air was used during the procedure, expelling large amounts of air   from your rectum or belching is normal.  - If a bowel prep was taken, you may not have a bowel movement for 1-3 days.    This is normal.  SYMPTOMS TO WATCH FOR AND REPORT TO YOUR PHYSICIAN:  1. Abdominal pain or bloating, other than gas cramps.  2. Chest pain.  3. Back pain.  4. Signs of infection such as: chills or fever occurring within 24 hours   after the procedure.  5. Rectal bleeding, which would show as bright red, maroon, or black stools.   (A tablespoon of blood from the rectum is not serious, especially if   hemorrhoids are present.)  6. Vomiting.  7. Weakness or dizziness.  GO DIRECTLY TO THE NEAREST EMERGENCY ROOM IF YOU HAVE ANY OF THE FOLLOWING:      Difficulty breathing              Chills and/or fever over 101 F   Persistent vomiting and/or vomiting blood   Severe abdominal pain   Severe chest pain   Black, tarry stools   Bleeding- more than one  tablespoon   Any other symptom or condition that you feel may need urgent attention  Your doctor recommends these additional instructions:  If any biopsies were taken, your doctors clinic will contact you in 1 to 2   weeks with any results.  - Return patient to ICU for ongoing care.   - Await pathology and culture results.   - Consider possibly a fecal transplant.  Or possibly colectomy.  Refer to a   surgeon today.  - Consider transfer to main [facility] today or tomorrow.  - Continue present medications.   - Repeat colonoscopy (date not yet determined).   - Put patient on a clear liquid diet starting today.  For questions, problems or results please call your physician - Bharath Edwards MD at Work:  [phone].  [facility], EMERGENCY ROOM PHONE NUMBER: [phone]  IF A COMPLICATION OR EMERGENCY SITUATION ARISES AND YOU ARE UNABLE TO REACH   YOUR PHYSICIAN - GO DIRECTLY TO THE EMERGENCY ROOM.  Bharath Edwards MD  10/31/2020 7:13:09 PM  This report has been verified and signed electronically.  [employer]

## 2020-11-02 NOTE — PROGRESS NOTES
[facility]  Patient Name: Alin Burkett  : 1950  Age: 70 y.o.  Sex: male  MRN: [id number]  Admission Date: 10/15/2020  Hospital Length of Stay: 18 days  Code Status: Full Code   Admitting Provider: Brennan Decker MD  Attending Provider: Brennan Decker MD  Primary Care Physician: Carmen Krueger MD  Full Code  Subjective:     Date of Visit: 2020             Principal Problem: Lower GI bleeding    Patient ID: Alin Burkett is a 70 y.o. 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 ED workup showed pt was neutropenic with WBC 0.2 with fever. Pt was cultured and given fluid resuscitation and vancomycin and cefepime. Stool cultures positive for C. Difficile toxin. CT abd/pelvis without contrast revealed severe proctocolitis without megacolon. Pt remains pancytopenic with most recent WBC 0.11 with ANC 0.0 and H/H 9.6/29.7 and platelets 91. Pt seen at bedside with his wife and states he has chronic throbbing abdominal pain made worse by eating and has had chronic diarrhea. He states he is fatigued and has decreased appetite along with fever/chills and night sweats.    10/19/2020: Pt seen at bedside and appears cachectic. He endorses that he is still having diarrhea, but that it has improved and decreased in frequency and consistency has thickened. Pt states abdominal pain has improved, but it still present. He endorses fatigue. Pt denies hemoptysis, hematochezia, melena, hematuria.    10/20/2020: Pt seen at bedside in ICU. He states that he is feeling less lethargic. He states his diarrhea is improving and that he only has abdominal pain to palpation.    10/21/2020: Pt seen at bedside in ICU NAD. He states his abdominal pain has decreased. He is still having diarrhea but  with less frequency.     10/22/2020: Pt seen at bedside in ICU. Pt states his abdominal pain has decreased and it no longer hurt to palpate his abdomen. Pt states diarrhea is improving.     10/23/2020: Pt seen at bedside in ICU. Pt states he no longer has abdominal pain and that his fatigue is improving and that his stool is thickening in consistency.    10/26/2020: Pt seen at bedside. He states abdominal pain has been intermittent and that he has had worsening of diarrhea over the last 3 days. No other complaints at this time.     10/28/2020: Pt seen at bedside NAD. He states his abdominal pain has decreased. No new complaints at this time.     10/29/2020: Pt seen at bedside NAD. Fatigue baseline. Abdominal pain is improving. Pt states that he is still having diarrhea. Pt has had blood-tinged stool.    10/30/2020: Pt seen at bedside with wife. He states he has had an increase in fatigue and abdominal pain and has had bloody diarrhea. Pt's wife complains that patient still has sutures in place wounds on bilateral forearms.     11/01/2020: Pt seen by hem/onc Beto Lindo. Please see his note.    11/02/2020: Pt seen in ICU. Pt had noted c. Diff pseudomembrane colitis and had exploratory lap, with total colectomy, cholecystectomy, and right ileostomy 11/01/2020. Pt was given 2 units PRBC and 1 unit of platelets and 2 units of FFP prior to surgery. Pt had significant blood loss (900cc) during surgery and received 2 additional units of PRBC overnight. Pt seen at bedside with tachypnea. Pt states he had abdominal pain throughout s/p surgery.     Review of Systems   Constitutional: Positive for activity change and fatigue. Negative for chills and fever.   HENT: Negative for mouth sores and trouble swallowing.    Eyes: Negative for photophobia and visual disturbance.   Respiratory: Negative for cough, chest tightness, shortness of breath, wheezing and stridor.    Cardiovascular: Negative for chest pain and leg swelling.    Gastrointestinal: Positive for abdominal distention, abdominal pain and diarrhea (bloody). Negative for constipation, nausea and vomiting.   Musculoskeletal: Negative for arthralgias, back pain and myalgias.   Skin: Negative for color change, pallor, rash and wound.   Neurological: Negative for syncope, speech difficulty and weakness.   Hematological: Negative for adenopathy. Does not bruise/bleed easily.   Psychiatric/Behavioral: Negative for agitation, behavioral problems, confusion, decreased concentration and dysphoric mood.        ONCOLOGY HISTORY:     1. Monomorphic post-transplant lymphoproliferative disorder              A. 2/2020: Noticed left inguinal lymphadenopathy after a dog bite (failed to resolve with antibiotics)              B. 6/2/2020: Saw Dr. Collins in infectious diseases for inguinal lymphadenopathy - referred for biopsy              C. 6/30/2020: Core biopsy of L inguinal lymph node shows B-cell lymphoma of germinal center origin; EBV-positive by LONNIE; morphology nondiagnostic of PTLD vs follicular lymphoma              D. 7/8/2020: PET/CT shows left internal iliac chain node measuring 3.3 x 3 cm with SUV max 37; L inguinal node measures 3.2 x 2.4 cm with SUV max 36              E. 7/13/2020: Excisional biopsy of left inguinal node shows monomorphic post-transplant lymphoproliferative disorder (DLBCL, GCB 60%, follicular lymphoma, grade 3B 40%); FISH for MYC rearrangement is negative              F. 7/20/2020: Bone marrow biopsy shows no evidence of B-cell lymphoma                Past Medical History:   Diagnosis Date    Acidosis     Adrenal adenoma     Anemia associated with chronic renal failure     Arrhythmia, onset 1995 5/1/2015    Awaiting organ transplant status 11/26/2013    Basal cell carcinoma 06/12/2012    left nasal tip    Blood type B+ 11/26/2013    Calcium nephrolithiasis 10/16/2012    Cancer     Celiac artery dissection     Chronic diarrhea     Chronic urethral  stricture     Congenital absence of kidney     left    -donor kidney transplant 16     Induced w Campath 30 mg IV intraoperatively & SoluMedrol 875 mg total over 3 days.  Renal allograft biopsy 17 (DIVINE): 21 glomeruli, none globally sclerosed, <5% interstitial fibrosis, no ACR, c4d negative, AVR CCT Type 2 (V1 lesion); plan THYMO     Dissecting aortic aneurysm (any part), abdominal     Diverticulosis     Encounter for blood transfusion     ESRD (end stage renal disease) 2010    Febrile neutropenia 10/15/2020    H/O urethral stricture 2018    H/O: urethral stricture     History of AAA (abdominal aortic aneurysm) repair     History of urethral stricture 2018    Hypertension     Hypokalemia     Hypothyroidism 1/10/2014    Inguinal hernia bilateral, non-recurrent     Kidney stones     Organ transplant candidate 2013    Plantar warts 1/10/2014    Recurrent UTI 2017    S/P kidney transplant     Secondary hyperparathyroidism, renal     Sepsis 10/24/2020    Thyroid disease        Family History   Problem Relation Age of Onset    Diabetes Mother     Alzheimer's disease Mother     Alcohol abuse Father     HIV Brother     Stroke Maternal Aunt     Kidney disease Paternal Uncle     Kidney disease Cousin     No Known Problems Sister     No Known Problems Daughter     No Known Problems Sister     No Known Problems Brother     No Known Problems Brother     Cancer Brother         thyroid cancer    Colon cancer Brother     Melanoma Neg Hx     Psoriasis Neg Hx     Lupus Neg Hx     Eczema Neg Hx     Colon polyps Neg Hx     Crohn's disease Neg Hx     Ulcerative colitis Neg Hx     Celiac disease Neg Hx        Past Surgical History:   Procedure Laterality Date    ABDOMINAL SURGERY      exploratory lapatomy x 2    ABLATION N/A 2019    Procedure: ABLATION, SVT;  Surgeon: Emerson Mcmanus MD;  [facility];  Service: Cardiology;   Laterality: N/A;  SVT, RFA, CARTO, anes, GP, 323    AORTA - SUPERIOR MESENTERIC ARTERY BYPASS GRAFT      BLADDER NECK RECONSTRUCTION      BLADDER SURGERY      COLONOSCOPY  10/10/2013    Dr. Gutierrez, repeat in 5 years    COLONOSCOPY N/A 10/31/2020    Procedure: COLONOSCOPY;  Surgeon: Bharath Edwards Jr., MD;  Location: [facility] ENDO;  Service: Endoscopy;  Laterality: N/A;    CYSTOSCOPY      CYSTOSCOPY N/A 12/19/2018    Procedure: CYSTOSCOPY;  Surgeon: Dewey Mann MD;  [facility];  Service: Urology;  Laterality: N/A;  45 min    CYSTOURETHROSCOPY WITH DIRECT VISION INTERNAL URETHROTOMY N/A 12/19/2018    Procedure: CYSTOSCOPY, WITH DIRECT VISION INTERNAL URETHROTOMY;  Surgeon: Dewey Mann MD;  [facility];  Service: Urology;  Laterality: N/A;    DILATION OF URETHRA N/A 12/19/2018    Procedure: DILATION, URETHRA;  Surgeon: Dewey Mann MD;  [facility];  Service: Urology;  Laterality: N/A;    ESOPHAGOGASTRODUODENOSCOPY N/A 10/31/2020    Procedure: ESOPHAGOGASTRODUODENOSCOPY (EGD);  Surgeon: Bharath Edwards Jr., MD;  Location: [facility] ENDO;  Service: Endoscopy;  Laterality: N/A;    EXCISIONAL BIOPSY N/A 7/13/2020    Procedure: EXCISIONAL BIOPSY- LEFT INGUINAL NODE;  Surgeon: Vlad Epstein MD;  Location: [facility] OR [address] [place] [address];  Service: General;  Laterality: N/A;    FLEXIBLE CYSTOSCOPY N/A 11/6/2019    Procedure: CYSTOSCOPY, FLEXIBLE;  Surgeon: Dewey Mann MD;  [facility];  Service: Urology;  Laterality: N/A;    GASTROJEJUNOSTOMY  ~1997    HEMORRHOID SURGERY      HERNIA REPAIR      INSERTION OF VENOUS ACCESS PORT Left 7/27/2020    Procedure: INSERTION, VENOUS ACCESS PORT;  Surgeon: Vlad Epstein MD;  [facility];  Service: General;  Laterality: Left;    KIDNEY TRANSPLANT      LEFT HEART CATHETERIZATION Left 8/20/2019    Procedure: Left heart cath;  Surgeon: Javan Oscar MD;  Location: [facility] CATH/EP LAB;   Service: Cardiology;  Laterality: Left;    LITHOTRIPSY      LYMPH NODE BIOPSY N/A 6/30/2020    Procedure: BIOPSY, LYMPH NODE;  Surgeon: Ella Diagnostic Provider;  Location: [facility] OR [address] [place] [address];  Service: General;  Laterality: N/A;  189 lymph node biopsy /ULTRASOUND    PERCUTANEOUS NEPHROLITHOTRIPSY      right  ESWL  10/31/12    right ESWL  6/26/12    URETHROPLASTY USING PATCH GRAFT N/A 11/6/2019    Procedure: URETHROPLASTY, USING PATCH GRAFT BUCCAL MUCOSA GRAFT;  Surgeon: Dewey Mann MD;  Location: [facility] OR [address] [place] [address];  Service: Urology;  Laterality: N/A;  3 HOURS       Social History     Socioeconomic History    Marital status: Single     Spouse name: Not on file    Number of children: Not on file    Years of education: Not on file    Highest education level: Not on file   Occupational History     Employer: Disabled   Social Needs    Financial resource strain: Not on file    Food insecurity     Worry: Not on file     Inability: Not on file    Transportation needs     Medical: Not on file     Non-medical: Not on file   Tobacco Use    Smoking status: Former Smoker     Packs/day: 0.50     Years: 40.00     Pack years: 20.00     Types: Cigarettes     Quit date: 6/16/2010     Years since quitting: 10.3    Smokeless tobacco: Never Used   Substance and Sexual Activity    Alcohol use: Yes     Alcohol/week: 3.0 standard drinks     Types: 3 Cans of beer per week     Comment: occasional/social    Drug use: No     Comment: THC in youth    Sexual activity: Yes     Partners: Female     Birth control/protection: None   Lifestyle    Physical activity     Days per week: Not on file     Minutes per session: Not on file    Stress: Not on file   Relationships    Social connections     Talks on phone: Not on file     Gets together: Not on file     Attends Yarsanism service: Not on file     Active member of club or organization: Not on file     Attends meetings of clubs or organizations: Not on file     Relationship  status: Not on file   Other Topics Concern    Not on file   Social History Narrative    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       Current Facility-Administered Medications   Medication Dose Route Frequency Provider Last Rate Last Dose    0.9%  NaCl infusion (for blood administration)   Intravenous Q24H PRN Roger Guerrero MD        0.9%  NaCl infusion   Intravenous Continuous Roger Guerrero MD 10 mL/hr at 10/31/20 1538      albuterol-ipratropium 2.5 mg-0.5 mg/3 mL nebulizer solution 3 mL  3 mL Nebulization Q6H PRN Roger Guerrero MD   3 mL at 10/20/20 1602    allopurinoL tablet 100 mg  100 mg Oral Daily Roger Guerrero MD   100 mg at 11/02/20 0855    Amino acid 4.25% - dextrose 5% (CLINIMIX-E) solution with additives (1L provides 42.5 gm AA, 50 gm CHO (170 kcal/L dextrose), Na 35, K 30, Mg 5, Ca 4.5, Acetate 70, Cl 39, Phos 15)   Intravenous Continuous Brennan Decker MD 85 mL/hr at 11/01/20 2223      Amino acid 4.25% - dextrose 5% (CLINIMIX-E) solution with additives (1L provides 42.5 gm AA, 50 gm CHO (170 kcal/L dextrose), Na 35, K 30, Mg 5, Ca 4.5, Acetate 70, Cl 39, Phos 15)   Intravenous Continuous Brennan Decker MD        brimonidine-timoloL 0.2-0.5 % ophthalmic solution 1 drop  1 drop Both Eyes Q12H Roger Guerrero MD   1 drop at 11/02/20 1035    calcitRIOL capsule 0.5 mcg  0.5 mcg Oral Daily Roger Guerrero MD   0.5 mcg at 11/02/20 0854    calcium gluc in NaCl, iso-osm 1 gram/50 mL Soln 1,000 mg  1 g Intravenous Once Roger Guerrero MD        calcium gluconate 1g in dextrose 5% 100mL (ready to mix system)  1 g Intravenous PRN Roger Guerrero MD   1 g at 11/01/20 1334    calcium gluconate 2 g in dextrose 5 % 100 mL IVPB  2 g Intravenous PRN Roger Guerrero MD        calcium gluconate 3 g in dextrose 5 % 100 mL IVPB  3 g Intravenous PRN Roger Guerrero MD        cefepime in dextrose 5 % 1 gram/50 mL IVPB 1 g  1 g Intravenous Q12H Roger Guerrero  mL/hr at  11/02/20 0747 1 g at 11/02/20 0747    cholestyramine-aspartame 4 gram packet 4 g  1 packet Oral TID Roger Guerrero MD   4 g at 10/31/20 2012    dicyclomine capsule 10 mg  10 mg Oral QID PRN Roger Guerrero MD        fat emulsion 20% infusion 250 mL  250 mL Intravenous Daily Brennan Decker MD        ferrous sulfate EC tablet 325 mg  325 mg Oral BID Roger Guerrero MD   325 mg at 11/02/20 0901    fidaxomicin tablet 200 mg  200 mg Oral BID Roger Guerrero MD   200 mg at 11/02/20 0856    heparin (porcine) injection 3,000 Units  3,000 Units Intravenous PRN Brennan Decker MD        hyoscyamine ODT 0.125 mg  0.125 mg Sublingual Q4H PRN Roger Guerrero MD   0.125 mg at 11/01/20 0816    levothyroxine tablet 100 mcg  100 mcg Oral Before breakfast Roger Guerrero MD   100 mcg at 11/01/20 0559    magnesium sulfate 2g in water 50mL IVPB (premix)  2 g Intravenous PRN Roger Guerrero MD        magnesium sulfate 2g in water 50mL IVPB (premix)  4 g Intravenous PRN Roger Guerrero MD        metoclopramide HCl injection 10 mg  10 mg Intravenous Q6H PRN Roger Guerrero MD        metronidazole IVPB 500 mg  500 mg Intravenous Q8H Roger Guerrero  mL/hr at 11/02/20 1034 500 mg at 11/02/20 1034    micafungin 100 mg in sodium chloride 0.9 % 100 mL IVPB (ready to mix system)  100 mg Intravenous Q24H Roger Guerrero  mL/hr at 11/01/20 1728 100 mg at 11/01/20 1728    midodrine tablet 10 mg  10 mg Oral TID Roger Guerrero MD   10 mg at 11/02/20 0852    morphine injection 2 mg  2 mg Intravenous Q6H PRN Roger Guerrero MD        phenylephrine (GATITO-SYNEPHRINE) 100 mg in sodium chloride 0.9% 250 mL infusion  0.5 mcg/kg/min Intravenous Continuous Brennan Decker MD 36 mL/hr at 11/02/20 1341 3 mcg/kg/min at 11/02/20 1341    potassium chloride 10 mEq in 100 mL IVPB  40 mEq Intravenous PRN Roger Guerrero  mL/hr at 10/20/20 0729 40 mEq at 10/20/20 0729    And    potassium chloride 10 mEq in 100  mL IVPB  60 mEq Intravenous PRN Roger Guerrero  mL/hr at 11/01/20 0628 60 mEq at 11/01/20 0628    And    potassium chloride 10 mEq in 100 mL IVPB  80 mEq Intravenous PRN Roger Guerrero MD        potassium chloride SA CR tablet 20 mEq  20 mEq Oral BID Roberto Carlos Conn MD        promethazine tablet 25 mg  25 mg Oral Q6H PRN Roger Guerrero MD        sodium bicarbonate 150 mEq in dextrose 5 % 1,000 mL infusion   Intravenous Continuous Tc Malave  mL/hr at 11/02/20 0653      sodium bicarbonate tablet 650 mg  650 mg Oral TID Roger Guerrero MD   Stopped at 11/02/20 0900    sodium chloride 0.9% flush 10 mL  10 mL Intravenous PRN Roger Guerrero MD        sodium chloride 0.9% flush 10 mL  10 mL Intravenous PRN Gilmer Adler MD        sodium chloride 0.9% flush 10 mL  10 mL Intravenous PRN Gilmer Adler MD        sucralfate 100 mg/mL suspension 1 g  1 g Oral QID (AC & HS) Roger Guerrero MD   1 g at 11/02/20 1035    tacrolimus capsule 2 mg  2 mg Oral Daily PM Roger Guerrero MD   2 mg at 10/31/20 2012    tacrolimus capsule 3 mg  3 mg Oral Daily AM Roger Guerrero MD   3 mg at 11/02/20 0852    travoprost 0.004 % ophthalmic solution 1 drop  1 drop Both Eyes QHS Roger Guerrero MD   1 drop at 11/01/20 2145    vancomycin - pharmacy to dose   Intravenous pharmacy to manage frequency Roger Guerrero MD            allopurinoL  100 mg Oral Daily    brimonidine-timoloL  1 drop Both Eyes Q12H    calcitRIOL  0.5 mcg Oral Daily    calcium gluc in NaCl, iso-osm  1 g Intravenous Once    ceFEPime (MAXIPIME) IVPB  1 g Intravenous Q12H    cholestyramine-aspartame  1 packet Oral TID    fat emulsion 20%  250 mL Intravenous Daily    ferrous sulfate  325 mg Oral BID    fidaxomicin  200 mg Oral BID    levothyroxine  100 mcg Oral Before breakfast    metronidazole  500 mg Intravenous Q8H    micafungin (MYCAMINE) IVPB  100 mg Intravenous Q24H    midodrine  10 mg Oral TID    potassium  chloride  20 mEq Oral BID    sodium bicarbonate  650 mg Oral TID    sucralfate  1 g Oral QID (AC & HS)    tacrolimus  2 mg Oral Daily PM    tacrolimus  3 mg Oral Daily AM    travoprost  1 drop Both Eyes QHS        sodium chloride 0.9% 10 mL/hr at 10/31/20 1538    Amino acid 4.25% - dextrose 5% (CLINIMIX-E) solution with additives (1L provides 42.5 gm AA, 50 gm CHO (170 kcal/L dextrose), Na 35, K 30, Mg 5, Ca 4.5, Acetate 70, Cl 39, Phos 15) 85 mL/hr at 11/01/20 2223    Amino acid 4.25% - dextrose 5% (CLINIMIX-E) solution with additives (1L provides 42.5 gm AA, 50 gm CHO (170 kcal/L dextrose), Na 35, K 30, Mg 5, Ca 4.5, Acetate 70, Cl 39, Phos 15)      phenylephrine 3 mcg/kg/min (11/02/20 1341)    sodium bicarbonate drip 125 mL/hr at 11/02/20 0653       sodium chloride, albuterol-ipratropium, calcium gluconate IVPB, calcium gluconate IVPB, calcium gluconate IVPB, dicyclomine, heparin (porcine), hyoscyamine, magnesium sulfate IVPB, magnesium sulfate IVPB, metoclopramide HCl, morphine, potassium chloride in water **AND** potassium chloride in water **AND** potassium chloride in water, promethazine, sodium chloride 0.9%, sodium chloride 0.9%, sodium chloride 0.9%, Pharmacy to dose Vancomycin consult **AND** vancomycin - pharmacy to dose    Antibiotics (From admission, onward)    Start     Stop Route Frequency Ordered    10/31/20 1900  metronidazole IVPB 500 mg  (C. difficile Infection (CDI) Treatment Order Panel)      -- IV Every 8 hours (non-standard times) 10/31/20 1751    10/30/20 2100  fidaxomicin tablet 200 mg      11/09 2059 Oral 2 times daily 10/30/20 1903    10/30/20 2045  cefepime in dextrose 5 % 1 gram/50 mL IVPB 1 g      -- IV Every 12 hours (non-standard times) 10/30/20 1934    10/30/20 2035  vancomycin - pharmacy to dose  (vancomycin IVPB)      -- IV pharmacy to manage frequency 10/30/20 1935          Review of patient's allergies indicates:  No Known Allergies  All medications and past history  have been reviewed.    Objective:      Vitals:  Patient Vitals for the past 24 hrs:   BP Temp Temp src Pulse Resp SpO2   11/02/20 1315 -- -- -- 103 (!) 28 100 %   11/02/20 1300 -- -- -- 106 (!) 29 100 %   11/02/20 1245 -- -- -- 103 (!) 29 100 %   11/02/20 1230 -- -- -- 104 (!) 31 100 %   11/02/20 1215 -- -- -- 103 (!) 30 100 %   11/02/20 1200 105/69 -- -- 104 (!) 28 100 %   11/02/20 1140 -- -- -- 94 (!) 28 (!) 80 %   11/02/20 1130 -- 98.3 °F (36.8 °C) Axillary 106 (!) 28 100 %   11/02/20 1115 -- -- -- 104 (!) 27 100 %   11/02/20 1045 (!) 118/52 -- -- 105 (!) 29 99 %   11/02/20 1030 106/65 -- -- 108 (!) 26 97 %   11/02/20 1015 120/78 -- -- 102 (!) 29 100 %   11/02/20 1000 (!) 143/54 -- -- 105 (!) 27 100 %   11/02/20 0945 (!) 150/63 -- -- 97 (!) 29 100 %   11/02/20 0930 134/61 -- -- 99 (!) 29 97 %   11/02/20 0915 (!) 82/49 -- -- 96 (!) 28 100 %   11/02/20 0900 (!) 102/53 -- -- 96 (!) 26 100 %   11/02/20 0845 (!) 107/51 -- -- 98 (!) 28 100 %   11/02/20 0830 (!) 112/53 -- -- 97 (!) 27 100 %   11/02/20 0815 126/60 -- -- 105 (!) 28 97 %   11/02/20 0805 (!) 77/47 -- -- 96 (!) 28 100 %   11/02/20 0745 (!) 90/59 -- -- 94 (!) 25 100 %   11/02/20 0730 (!) 90/59 97.6 °F (36.4 °C) Axillary 95 (!) 27 100 %   11/02/20 0600 -- -- -- 108 (!) 27 99 %   11/02/20 0545 -- -- -- 105 (!) 26 100 %   11/02/20 0530 -- -- -- 97 (!) 25 100 %   11/02/20 0515 -- -- -- 97 (!) 25 100 %   11/02/20 0500 -- -- -- 98 (!) 23 100 %   11/02/20 0445 -- 98.6 °F (37 °C) Axillary 99 (!) 23 100 %   11/02/20 0430 -- -- -- 96 (!) 26 97 %   11/02/20 0415 -- -- -- 99 (!) 26 100 %   11/02/20 0400 108/73 98.4 °F (36.9 °C) Axillary 100 (!) 25 100 %   11/02/20 0345 -- -- -- 101 (!) 26 100 %   11/02/20 0330 -- -- -- 101 (!) 26 100 %   11/02/20 0315 -- -- -- 102 (!) 28 100 %   11/02/20 0300 -- -- -- 102 (!) 28 100 %   11/02/20 0245 -- 98.5 °F (36.9 °C) Axillary 103 (!) 29 100 %   11/02/20 0230 -- 98.4 °F (36.9 °C) Axillary 103 (!) 29 100 %   11/02/20 0215 -- 98.4 °F  (36.9 °C) Axillary 103 (!) 29 100 %   11/02/20 0200 -- 98.4 °F (36.9 °C) Axillary 104 (!) 29 100 %   11/02/20 0145 -- 98.5 °F (36.9 °C) Axillary 106 (!) 29 100 %   11/02/20 0130 -- 98.5 °F (36.9 °C) Axillary 103 (!) 32 100 %   11/02/20 0115 -- -- -- 104 (!) 29 100 %   11/02/20 0100 (!) 84/37 98.5 °F (36.9 °C) Axillary 109 (!) 30 --   11/02/20 0045 (!) 112/55 98.5 °F (36.9 °C) -- 110 (!) 32 --   11/02/20 0030 (!) 106/57 98.5 °F (36.9 °C) -- 110 (!) 28 98 %   11/02/20 0015 (!) 71/41 -- -- (!) 115 (!) 28 100 %   11/02/20 0000 (!) 86/50 -- -- (!) 118 (!) 25 100 %   11/01/20 2345 (!) 101/51 -- -- (!) 115 (!) 28 100 %   11/01/20 2330 -- 98.5 °F (36.9 °C) Axillary (!) 115 (!) 32 100 %   11/01/20 2315 (!) 123/53 98.5 °F (36.9 °C) Axillary (!) 118 (!) 29 100 %   11/01/20 2300 (!) 86/56 98.6 °F (37 °C) Axillary (!) 122 (!) 27 100 %   11/01/20 2245 (!) 94/53 98.6 °F (37 °C) Axillary (!) 119 (!) 25 100 %   11/01/20 2230 (!) 74/52 98.6 °F (37 °C) Axillary (!) 123 (!) 26 100 %   11/01/20 2215 (!) 78/50 98.6 °F (37 °C) Axillary (!) 125 (!) 25 99 %   11/01/20 2200 90/62 98.7 °F (37.1 °C) Axillary (!) 123 (!) 26 99 %   11/01/20 2145 106/60 -- -- (!) 120 (!) 25 100 %   11/01/20 2144 -- -- -- -- (!) 27 --   11/01/20 2130 130/84 98.7 °F (37.1 °C) Axillary (!) 117 (!) 29 98 %   11/01/20 2115 (!) 132/91 98.7 °F (37.1 °C) Axillary 109 (!) 23 100 %   11/01/20 2100 130/84 98.8 °F (37.1 °C) Axillary 105 19 100 %   11/01/20 2045 121/74 -- -- 103 19 100 %   11/01/20 2030 116/75 -- -- 106 17 100 %   11/01/20 2015 102/67 -- -- 105 16 100 %   11/01/20 2000 93/60 -- -- 109 16 100 %   11/01/20 1945 111/67 -- -- 109 18 100 %   11/01/20 1930 (!) 85/55 98.8 °F (37.1 °C) Axillary (!) 111 15 100 %   11/01/20 1915 (!) 82/56 -- -- (!) 111 16 100 %   11/01/20 1905 (!) 83/52 -- -- (!) 111 17 100 %   11/01/20 1900 -- -- -- (!) 111 15 100 %   11/01/20 1800 104/72 -- -- 110 17 99 %   11/01/20 1755 112/75 98.7 °F (37.1 °C) Axillary 107 19 99 %   11/01/20 1750  118/80 -- -- 107 17 99 %   11/01/20 1745 126/85 -- -- 105 (!) 21 98 %   11/01/20 1740 131/82 -- -- 105 (!) 23 98 %   11/01/20 1735 133/85 -- -- 106 (!) 26 99 %   11/01/20 1730 136/83 -- -- 103 20 99 %   11/01/20 1725 (!) 158/64 -- -- 103 (!) 28 100 %   11/01/20 1720 -- -- -- 102 19 100 %   11/01/20 1715 127/81 -- -- 101 17 100 %   11/01/20 1710 128/78 -- -- 100 20 100 %   11/01/20 1705 -- 97.9 °F (36.6 °C) Axillary 102 20 100 %      Body mass index is 24.63 kg/m².  Body surface area is 2 meters squared.    Last 24 Hours:    Intake/Output Summary (Last 24 hours) at 11/2/2020 1429  Last data filed at 11/2/2020 1326  Gross per 24 hour   Intake 8963.91 ml   Output 4087 ml   Net 4876.91 ml     Weight Readings:  Wt Readings from Last 5 Encounters:   10/29/20 80.1 kg (176 lb 9.4 oz)   10/10/20 68.5 kg (151 lb)   10/09/20 66.4 kg (146 lb 6.2 oz)   10/02/20 65.8 kg (145 lb)   09/11/20 68.6 kg (151 lb 2 oz)      Blood Type:  B POS     Physical Exam  Constitutional:       Appearance: He is ill-appearing.   HENT:      Head: Normocephalic and atraumatic.      Right Ear: External ear normal.      Left Ear: External ear normal.      Nose: Nose normal. No congestion or rhinorrhea.   Eyes:      General:         Right eye: No discharge.         Left eye: No discharge.      Extraocular Movements: Extraocular movements intact.      Conjunctiva/sclera: Conjunctivae normal.      Pupils: Pupils are equal, round, and reactive to light.   Neck:      Musculoskeletal: Normal range of motion and neck supple. No neck rigidity.   Cardiovascular:      Rate and Rhythm: Normal rate and regular rhythm.      Heart sounds: Normal heart sounds. No murmur.   Pulmonary:      Breath sounds: Rales present.      Comments: tachypnea    Abdominal:      General: Bowel sounds are normal. There is distension.      Palpations: Abdomen is soft.      Tenderness: There is abdominal tenderness (to palpation in the LLQ). There is no guarding.   Musculoskeletal: Normal  range of motion.         General: No deformity.      Right lower leg: No edema.      Left lower leg: No edema.   Lymphadenopathy:      Cervical: No cervical adenopathy.   Skin:     General: Skin is warm and dry.      Coloration: Skin is not pale.      Findings: Bruising (bialteral UE and chest) and lesion (wounds on bilateral forearms with sutures in place) present. No erythema or rash.   Neurological:      General: No focal deficit present.      Mental Status: He is alert and oriented to person, place, and time. Mental status is at baseline.      Cranial Nerves: No cranial nerve deficit.   Psychiatric:         Mood and Affect: Mood normal.         Behavior: Behavior normal.         Thought Content: Thought content normal.         Judgment: Judgment normal.         Labs:  Recent Labs   Lab 10/30/20  1733  11/01/20  1121 11/01/20  2036 11/02/20  0335 11/02/20  0901   WBC 3.99   < > 4.00 8.03 21.70* 22.70*   RBC 2.56*   < > 2.78* 2.45*  --  2.26*   HGB 8.2*   < > 8.8* 7.2* 7.9* 7.2*   HCT 25.7*   < > 26.4* 22.5*  --  19.9*      < > 145* 141*  --  155   *   < > 95 92  --  88   INR 1.1  --  1.2  --   --   --     < > = values in this interval not displayed.     Recent Labs   Lab 11/01/20  0424 11/01/20  1121 11/01/20  1802 11/02/20 0335 11/02/20  0522     --   --  135* 134*   K 3.1*  --  4.1 5.0 5.1   *  --   --  120* 120*   CO2 10*  --   --  <5* <5*   BUN 34*  --   --  37* 38*   CREATININE 1.8*  --   --  2.3* 2.4*     --   --  137* 121*   CALCIUM 7.3* 6.9*  --  6.7* 6.7*   ALKPHOS  --   --   --   --  41*   PROT  --   --   --   --  4.3*   ALBUMIN  --   --   --   --  1.7*   BILITOT  --   --   --   --  0.8   AST  --   --   --   --  47*   ALT  --   --   --   --  14       Imaging:  Results for orders placed or performed during the hospital encounter of 10/15/20 (from the past 2160 hour(s))   CT Abdomen Pelvis  Without Contrast    Impression    Findings consistent with severe  proctocolitis.    Transplanted kidney right side of the pelvis with mild pelvocaliectasis and perinephric stranding nonspecific.  No calcified stones seen    Additional findings as detailed above including cystic lesion with in the native right kidney versus dilatation of collecting structure of the native right kidney.  Stones in the native right kidney.  Cystic lesion within the pancreas.    Final read    Virtual Radiology concordant      Electronically signed by: Ada Castillo MD  Date:    10/16/2020  Time:    08:56   Results for orders placed or performed during the hospital encounter of 08/06/20 (from the past 2160 hour(s))   CT Chest Abdomen Pelvis Without Contrast (XPD)    Impression    No acute abdominal pathology identified.    Nonvisualization of the left kidney with malrotation of the right kidney and multiple nonobstructing renal stones.  No hydronephrosis.    Right pelvic renal allograft with no evidence for renal allograft focal lesion, nephrolithiasis, or hydronephrosis.    Stable mild ectasia of the infrarenal abdominal aorta measuring up to 2.6 cm without evidence for aortic aneurysm or other acute aortic pathology.  Atherosclerosis identified.    Multiple enlarged lymph nodes in the left inguinal region, with interval dissection of multiple left inguinal and pelvic lymph nodes when compared to nuclear medicine PET-CT 07/08/2020.  Correlate with biopsy results.    Stable cystic appearing lesion in the pancreatic head measuring 1.2 cm.    Other findings as above.    Electronically signed by resident: Mati Cárdenas  Date:    08/06/2020  Time:    09:23    Electronically signed by: Jayson Staples MD  Date:    08/06/2020  Time:    10:34     *Note: Due to a large number of results and/or encounters for the requested time period, some results have not been displayed. A complete set of results can be found in Results Review.     PET CT 07/08/2020  FINDINGS:  Quality of the study: Adequate.     In the neck,  there is no abnormal hypermetabolic activity worrisome for malignancy.  Vascular stent identified in the left axillary region.  No significant lymphadenopathy.     In the chest, there is no abnormal hypermetabolic activity worsened malignancy.  Coronary atherosclerosis.  0.4 cm pulmonary nodule identified in the left upper lobe (axial series 3, image 67), lesion is too small to characterize with PET-CT. Recommend attenuation expected follow-up examinations. No significant lymphadenopathy.     In the abdomen and pelvis, there is hypermetabolic lymphadenopathy throughout the left internal/external iliac chain and left groin.  New left internal iliac chain node measures 3.3 x 3 0 cm with SUV max of 37 (axial fused image 193).  Left inguinal node measures approximately 3.2 x 2.4 cm with SUV max of 36 (axial fused image 218), previously measured up to 1.4 cm.  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  The right kidney appears atrophic with abnormal contour parenchymal calcifications, unchanged.  Right lower quadrant transplant kidney in place.  Stable small bladder diverticulum.  Stable 1.2 cm pancreatic cyst, better characterized on most recent CT with IV contrast.  Stable bilateral probable adrenal adenomas.  Colonic diverticulosis without evidence of acute diverticulitis.  Stable fusiform abdominal aortic ectasia.     Spleen appears upper limit of normal for size measuring 12.0 cm in craniocaudal dimension.  Normal heterogeneous uptake similar to liver.     In the bones, there is no abnormal activity worrisome for malignancy.  Additional focus of increased uptake identified within the myofascial structures of the thigh, just deep to the left femur with SUV max of 27 (axial fused image 269).     Impression:     Hypermetabolic lymphadenopathy throughout the left iliac chain and left groin with additional hypermetabolic focus in the left thigh.  No hypermetabolic juan david disease above the diaphragm.  The Deauville score is  5.    All lab results and imaging results have been reviewed.    Assessment and Plan:      Present on Admission:   (Resolved) Febrile neutropenia   C. difficile colitis   Acquired hypothyroidism   Post-transplant lymphoproliferative disorder (PTLD)   Acute renal failure superimposed on stage 3 chronic kidney disease   Hypokalemia due to excessive gastrointestinal loss of potassium   Anemia due to chemotherapy   Moderate malnutrition   Metabolic acidosis   (Resolved) Sepsis   Hypotension    septic shock/IDVINE/electrolyte abnormalities in the setting of C. Diff colitis.  -Nephrology, GI, ID and hospital medicine monitoring.     Leukocytosis  -Secondary to infection/inflammation. Continue to trend with CBCs.     Anemia  -Anemia secondary to blood loss and chronic disease. Orders placed for updated iron and TIBC. Pt has been on ferous sulfate 325mg BID for iron deficiency. Pt had significant blood loss (900cc) from surgery. If iron levels still low, then D/C oral iron and give venofer for IV iron supplementation.  -Pt planned to receive 2 units PRBC today due to being hemodynamically unstable..  -Transfuse PRBC PRN if pt has H/H <7/21 or if pt symptomatically anemic/hemodynamically unstable.  -Transfuse platelets PRN if platelet count <100,000 or if pt is actively bleeding.  -Continue to monitor anemia and platelet count with serial CBCs.      PTLD lymphoma, s/p renal transplant  -Cycle 4 RCHOP completed on 10/09/2020.  -[place] need [place] [facility] [place]   -Continue monitoring tacrolimus level.  -Will continue to monitor patient.    Sincerely,  Alexi Camp PA-C    Note is available for collaborating MD; Dr. Julienne Alfaro for review.    Electronically signed by: Alexi Camp PA-C

## 2020-11-02 NOTE — PROGRESS NOTES
"INPATIENT NEPHROLOGY PROGRESS NOTE  [facility] NEPHROLOGY    Alin Burkett  11/02/2020    Reason for consultation:  Acute kidney injury     Chief Complaint   Patient presents with    Diarrhea     for the past 3 days,last received chemotherapy 6 days ago.     Abdominal Pain     History of Present Illness:  Patient has been having diarrhea with "jelly-like" consistency as well as crampiness in the abdomen. Symptoms began roughly one week ago.  Also has had fever and malaise.  Appetite poor.  Fatigue as well.  He has been receiving chemo for PTLD; most recent cycle of CHOP was end of last week.  He has history of renal transplant four years ago and is currently on twice-a-day  Prograf.  He's had no cough, no unusual headache, no sinus pain, and no burning on urination.     10/20  Has some diarrhea and abdominal pain.  No nausea, chest pain, sob, new neuro symptoms, new joint pain.  He is very weak.    I told him that he had previously been seen by doctor Shonda and I would call him to see him.  He stated he didn't want to see Dr Merchant and requested that I become his nephrologist.    10/21  Not much change in renal function since yesterday. UOP 1.3L.  Sleeping quietly.  10/23  Sleeping.  1550 urine output.    10/24 VSS, no new complains. Appreciate ID and Heme input.  10/25 VSS, no new complains. sCr is better.  10/26 VSS, no new complains. Labs are acceptable. Lethargic at times.  10/27 VSS, no new complains.  10/28 VSS, no new complains. Needs to eat bettter.  10/29 VSS, no new complains.   10/30 VSS, no new complains. Still having blood-tinged diarrhea, treated for c.diff. On TPN due to very poor oral intake. Dr. Malave will cover after 5pm today and over the weekend.  10/31 sleeping.  AFVSS   11/1  Sleeping.  Hypotensive.  Tm 100.5- went for ex lap, TAC with end ileostomy and lysis of adhesion, almost 1L blood loss, transfused post OP  11/2 afebrile, tachy, BP better this AM but remains on darlene, on 4L NC, UOP 1.7L " but slowed down this AM, Hb drop- to get blood transfusion again today- c/w bloody output, also c/w weeping edema, WBC spiked to 22K    Plan of Care:    1. Septic/Hemorraghic shock in setting of C diff colitis s/p TAC 11/1 with end ileostomy and significant lysis of adhesion  - Plan to concentrate darlene- may need levophed.  - Needs more blood- told nurse to wait for dialysis-see below.  - Discussed case with gen surg.    2. Acute kidney injury 2/2 c.diff colitis  - Due to high obligate intake, weeping edema, dyspnea, need for more blood products, and metabolic derangements (hyponatremia, hyperkalemia, refractory acidemia), he is need of RRT  - I have discussed the risks and benefits of hemodialysis with the patient.  All of his questions were answered.  Risks include low blood pressure, stroke, heart attack, seizure, infection, nausea, delirium, and death. He has agree to proceed.  - Consulted Dr. Miller for a dialysis catheter and informed HD staff.   - Will need to redose meds for dialysis.    3. [place] metabolic acidosis likely combination of GI losses and renal tubular defect (urine pH inappropriately high)  - Proceed with RRT- ordered ABG and reviewed it- continue bicarb gtt for now     4. Hyperkalemia  - Change PO KCl to BID for now- once acidosis corrects, may need to resume higher dose.    5. Hypocalcemia  - Repleting PRN with IV maritza gluc.  - Check phos.  - Continue calcitriol.    6. PTLD lymphoma, s/p renal transplant  7. Anemia- multifactorial  - Continue prograf.  - Would transfuse with HD only.   - Not sure if any benefit to oral iron- consider D/C.  - Heme/onc is following.    8. Anasarca  - Hold UF until more stable- will run even today and at least facilitate blood transfusion and eventual discontinuation of bicarb gtt.  - On clinimix to optimize nutritional status/low albumin.    He is critically ill with high risk for mortality- overall prognosis is quite poor.     Thank you for allowing us to  participate in this patient's care. We will continue to follow.    Vital Signs:  Temp Readings from Last 3 Encounters:   11/02/20 97.6 °F (36.4 °C) (Axillary)   10/10/20 98.3 °F (36.8 °C) (Oral)   10/09/20 98.6 °F (37 °C)       Pulse Readings from Last 3 Encounters:   11/02/20 105   10/10/20 95   10/09/20 (!) 56       BP Readings from Last 3 Encounters:   11/02/20 (!) 118/52   10/10/20 102/63   10/09/20 115/76       Weight:  Wt Readings from Last 3 Encounters:   10/29/20 80.1 kg (176 lb 9.4 oz)   10/10/20 68.5 kg (151 lb)   10/09/20 66.4 kg (146 lb 6.2 oz)     Medications:  No current facility-administered medications on file prior to encounter.      Current Outpatient Medications on File Prior to Encounter   Medication Sig Dispense Refill    allopurinoL (ZYLOPRIM) 300 MG tablet Take 1 tablet (300 mg total) by mouth once daily. 30 tablet 2    aspirin (ECOTRIN) 81 MG EC tablet Take 1 tablet (81 mg total) by mouth once daily.  0    calcitRIOL (ROCALTROL) 0.5 MCG Cap Take 1 capsule (0.5 mcg total) by mouth once daily. 30 capsule 11    cefpodoxime (VANTIN) 100 MG tablet Take 1 tablet (100 mg total) by mouth every 12 (twelve) hours. 60 tablet 3    COMBIGAN 0.2-0.5 % Drop Place 1 drop into both eyes 2 (two) times a day.       famotidine (PEPCID) 20 MG tablet TAKE 1 TABLET EVERY EVENING (Patient taking differently: Take 20 mg by mouth every evening. ) 90 tablet 3    ketoconazole (NIZORAL) 200 mg Tab TAKE ONE-HALF (1/2) TABLET ONCE DAILY (Patient taking differently: Take 100 mg by mouth once daily. ) 45 tablet 3    levothyroxine (SYNTHROID) 100 MCG tablet TAKE 1 TABLET DAILY (Patient taking differently: Take 100 mcg by mouth before breakfast. Administer on an empty stomach at least 30 minutes before food. If receiving tube feeds, HOLD tube feeds for 1 hour before and after levothyroxine administration.) 90 tablet 3    magnesium oxide (MAG-OX) 400 mg (241.3 mg magnesium) tablet Take 1 tablet (400 mg total) by mouth  once daily. 90 tablet 3    multivitamin (ONE DAILY MULTIVITAMIN) per tablet Take 1 tablet by mouth once daily.      ondansetron (ZOFRAN-ODT) 8 MG TbDL Dissolve 1 tablet (8 mg total) by mouth every 12 (twelve) hours as needed. (Patient taking differently: Take 8 mg by mouth every 12 (twelve) hours as needed (nausea). ) 21 tablet 1    predniSONE (DELTASONE) 50 MG Tab Take 2 tablets (100 mg total) by mouth once daily. Take on days 2-5 of your chemotherapy cycles. 8 tablet 0    sodium bicarbonate 650 MG tablet Take 1 tablet (650 mg total) by mouth 2 (two) times daily. 540 tablet 3    tacrolimus (PROGRAF) 1 MG Cap Take 3 capsules (3 mg total) by mouth every morning AND 2 capsules (2 mg total) every evening. Z94.0/Kidney Transplant on 11/26/16. 150 capsule 11    travoprost (TRAVATAN Z) 0.004 % ophthalmic solution Place 1 drop into both eyes every evening.        Scheduled Meds:   allopurinoL  100 mg Oral Daily    brimonidine-timoloL  1 drop Both Eyes Q12H    calcitRIOL  0.5 mcg Oral Daily    calcium gluc in NaCl, iso-osm  1 g Intravenous Once    ceFEPime (MAXIPIME) IVPB  1 g Intravenous Q12H    cholestyramine-aspartame  1 packet Oral TID    fat emulsion 20%  250 mL Intravenous Daily    ferrous sulfate  325 mg Oral BID    fidaxomicin  200 mg Oral BID    levothyroxine  100 mcg Oral Before breakfast    metronidazole  500 mg Intravenous Q8H    micafungin (MYCAMINE) IVPB  100 mg Intravenous Q24H    midodrine  10 mg Oral TID    potassium chloride  20 mEq Oral TID    sodium bicarbonate  650 mg Oral TID    sucralfate  1 g Oral QID (AC & HS)    tacrolimus  2 mg Oral Daily PM    tacrolimus  3 mg Oral Daily AM    travoprost  1 drop Both Eyes QHS     Continuous Infusions:   sodium chloride 0.9% 10 mL/hr at 10/31/20 1538    Amino acid 4.25% - dextrose 5% (CLINIMIX-E) solution with additives (1L provides 42.5 gm AA, 50 gm CHO (170 kcal/L dextrose), Na 35, K 30, Mg 5, Ca 4.5, Acetate 70, Cl 39, Phos 15) 85  "mL/hr at 11/01/20 2223    Amino acid 4.25% - dextrose 5% (CLINIMIX-E) solution with additives (1L provides 42.5 gm AA, 50 gm CHO (170 kcal/L dextrose), Na 35, K 30, Mg 5, Ca 4.5, Acetate 70, Cl 39, Phos 15)      phenylephrine 3 mcg/kg/min (11/02/20 1107)    sodium bicarbonate drip 125 mL/hr at 11/02/20 0653     PRN Meds:.sodium chloride, albuterol-ipratropium, calcium gluconate IVPB, calcium gluconate IVPB, calcium gluconate IVPB, dicyclomine, hyoscyamine, magnesium sulfate IVPB, magnesium sulfate IVPB, metoclopramide HCl, morphine, potassium chloride in water **AND** potassium chloride in water **AND** potassium chloride in water, promethazine, sodium chloride 0.9%, sodium chloride 0.9%, sodium chloride 0.9%, Pharmacy to dose Vancomycin consult **AND** vancomycin - pharmacy to dose    Allergies:  Patient has no known allergies.    Past Family History:  Reviewed; refer to Hospitalist Admission Note    Review of Systems:  Review of Systems - All 14 systems reviewed and negative, except as noted in HPI    Physical Exam:    BP (!) 118/52   Pulse 105   Temp 97.6 °F (36.4 °C) (Axillary)   Resp (!) 29   Ht 5' 11" (1.803 m)   Wt 80.1 kg (176 lb 9.4 oz)   SpO2 99%   BMI 24.63 kg/m²     Constitutional: tachypneic, aao x 3, ill appearing, appears stated age  Heart: tachy, no m/r/g, wwp, anasarca  Lungs: coarse, + rhonchi, no w/r/c, + lb  Abdomen: s/nt/nd, +BS, + ostomy    Results:  Lab Results   Component Value Date     (L) 11/02/2020    K 5.1 11/02/2020     (H) 11/02/2020    CO2 <5 (LL) 11/02/2020    BUN 38 (H) 11/02/2020    CREATININE 2.4 (H) 11/02/2020    CALCIUM 6.7 (LL) 11/02/2020    ANIONGAP Unable to calculate 11/02/2020    ESTGFRAFRICA 31 (A) 11/02/2020    EGFRNONAA 27 (A) 11/02/2020       Lab Results   Component Value Date    CALCIUM 6.7 (LL) 11/02/2020    PHOS 2.8 10/21/2020       Recent Labs   Lab 11/02/20  0901   WBC 22.70*   RBC 2.26*   HGB 7.2*   HCT 19.9*      MCV 88   MCH 31.9* "   [facility] 36.2*       I have personally reviewed pertinent radiological imaging and reports.    Roberto Carlos Conn MD MPH  [facility] [address] [address]  [phone] (p)  [phone] (f)

## 2020-11-02 NOTE — MEDICAL/APP STUDENT
"[facility]  Progress Note    Patient Name: Alin Burkett  MRN: [id number]  Patient Class: IP- Inpatient   Admission Date: 10/15/2020  Length of Stay: 18 days  Attending Physician: Brennan Decker MD  Primary Care Provider: Carmen Krueger MD    Subjective:     Chief Complaint: Lower GI bleeding 2/2 C. Diff colitis    HPI: Mr Burkett is a 70 y.o. M with PMHx CKD s/p renal transplant on Prograf BID, Post-transplant lymphoproliferative disease (B-cell lymphoma) with active chemo treatment plan, HTN, BPH, and LOUISA (no use of CPAP) who presented with diarrhea of "jelly-like" consistency and abdominal cramping. Symptoms began 1 week prior to presentation. He has had a decreased PO intake 2/2 poor appetite, fatigue, fever, and malaise. He has current diagnosis of B cell lymphoma (PTLD) and most recent dose of R-CHOP (rituximab, cyclophosphamide, doxorubicin, vincristine, prednisone) on 10/09. He denies cough, headache, sinus pain, dysuria.     Overview/Hospital Course:  Admitted to ICU on 10/15 for septic shock in the context of persistent diarrhea. Full septic workup initiated and patient started on IV norepinephrine and fluids for hypotension. Broad spectrum antibiotics vancomycin and cefepime initiated 10/15. Found to have pseudomonas UTI and positive for C. Difficile, antibiotics managed accordingly. Initially responded well to treatment including IV vanc, vanc enemas, and diflicid, but began to have worsening and bloody diarrhea starting 10/30. Underwent colonoscopy which revealed severe inflammation and active bleeding enterocolitis.Was transfused 3 units pRBC due to Hgb 6.8 (10/31). Decision was made to proceed with colectomy due to failure of treatment regimen and active bleeding. Patient found to have blood antibodies, making blood availability a concern. Attempted transfer to [facility], however, center was diverting critical care patients at the time. Patient underwent colectomy and " cholecystectomy at [facility] on 11/01 with significant blood loss of 1L.    Interval History: Patient complaining of abdominal pain. Overnight pt had critical CO2 (<5), patient given IV Bicarb. Nephrology saw this AM for DIVINE. Due to decreased UOP, rising creatinine, hyponatremia, hyperkalemia, and severe acidosis despite intervention, plans to gain temporary hemodialysis access today and proceed with hemodialysis. Patient consented. CBC with Hgb 7.2, hold to transfuse pRBC until dialysis.         Objective:      Vitals:    11/02/20 1230 11/02/20 1245 11/02/20 1300 11/02/20 1315   BP:       Pulse: 104 103 106 103   Resp: (!) 31 (!) 29 (!) 29 (!) 28   Temp:       TempSrc:       SpO2: 100% 100% 100% 100%   Weight:       Height:           Intake/Output Summary (Last 24 hours) at 11/2/2020 1335  Last data filed at 11/2/2020 1326  Gross per 24 hour   Intake 11892.91 ml   Output 4087 ml   Net 6026.91 ml     Physical Exam   Constitutional: He is oriented to person, place, and time. He appears distressed.   ill-appearing   HENT:   Head: Normocephalic.   Head wrapped with towel   Eyes: Pupils are equal, round, and reactive to light.   Neck: Normal range of motion.   Cardiovascular:   Murmur heard.  tachycardic   Pulmonary/Chest: He is in respiratory distress. He has rhonchi in the right lower field and the left lower field.   Increased respiratory effort and rate  Coarse crackles   Abdominal: Soft. There is abdominal tenderness.   Large midline dressing  BL RIO drains in place draining serosanguinous fluid  RLQ stoma intact   Musculoskeletal:         General: Edema present. No tenderness.   Neurological: He is oriented to person, place, and time. He displays weakness.   Somnolent   Skin:   Skin weeping due to hypoalbuminemia       Assessment/Plan:     Septic Shock in context of C. Diff colitis  Failed initial therapies of vancomycin, metronidazole, and fidaxomicin. S/P TAC (11/01) with end ileostomy and cholecystectomy.   -  Hgb 7.2, will transfuse pRBC after dialysis  - Continue antibiotics    Hypotension  - Hypotensive to 71/37 overnight, started on darlene-synephrine. Will transition to Levophed   - Continue midodrine    Acute Kidney injury  - Cr 2.4 this AM, BUN 38; refractory metabolic acidosis  - On bicarb drip  - Will need hemodialysis; consult for temporary vascular access; consent for hemodialysis obtained by nephrology  -Will re-dose medications for dialysis    Moderate Malnutrition  Nutrition consulted, BMI 24.63. Patient is on TPN    Non-anion gap metabolic acidosis  Bicarb critically low this AM. Likely 2/2 to GI losses and renal tubular dysfunction. On bicarbonate IV.   - Start hemodialysis today    Hyperkalemia  - PO Potassium citrate dose changed to BID, may increase after acidosis corrects with dialysis    Anasarca  - Albumin 1.7 (); On clinimix     PTLD B Cell Lymphoma  - Active treatment plan includes R-CHOP; most recent dosing 10/09  - Heme/onc following      Active Diagnoses:    Diagnosis Date Noted POA    PRINCIPAL PROBLEM:  Lower GI bleeding [K92.2] 10/30/2020 No    Hypotension [I95.9] 10/27/2020 Yes    Moderate malnutrition [E44.0] 10/20/2020 Yes    Anemia due to chemotherapy [D64.81, T45.1X5A]  Yes    C. difficile colitis [A04.72] 10/15/2020 Yes    Hypokalemia due to excessive gastrointestinal loss of potassium [E87.6] 10/15/2020 Yes    Post-transplant lymphoproliferative disorder (PTLD) [T86.99, D47.Z1] 2020 Yes    Acute renal failure superimposed on stage 3 chronic kidney disease [N17.9, N18.30] 2017 Yes    -donor kidney transplant [Z94.0]  Not Applicable    Acquired hypothyroidism [E03.9] 01/10/2014 Yes    Metabolic acidosis [E87.2]  Yes      Problems Resolved During this Admission:    Diagnosis Date Noted Date Resolved POA    Sepsis [A41.9] 10/24/2020 10/27/2020 Yes    Febrile neutropenia [D70.9, R50.81] 10/15/2020 10/29/2020 Yes     VTE Risk Mitigation (From admission,  onward)         Ordered     IP VTE LOW RISK PATIENT  Once      10/15/20 0689                Davida Mccray, MS3  [facility]

## 2020-11-02 NOTE — PROGRESS NOTES
After informed consent obtained a trialysis catheter was inserted via the L internal jugular vein in the usual sterile fashion without complication  Tolerated well  Follow up CXR reveals catheter to be in good position and no pneumothorax seen    OK to us catheter for hemodialysis

## 2020-11-02 NOTE — ASSESSMENT & PLAN NOTE
ID and GI services following.  On multiple antimicrobials.  Endoscopic exam of colon reveals mucosal abnormality c/w Cdiff and bleeding from throughout colon.  ID consultant concerned about persistent bleeding from mucosa, and the disease is probably refractory to treatment.  FMT is a consideration, but FDA may have restrictions against its use at this time.  At this stage, surgical treatment of the infection is indicated.  CRS consulted and is planning either total or subtotal colectomy.

## 2020-11-02 NOTE — ASSESSMENT & PLAN NOTE
Due to bicarb loss through GI tract.  Continue bicarb supplement.    CO2   Date Value Ref Range Status   11/01/2020 10 (L) 23 - 29 mmol/L Final

## 2020-11-02 NOTE — CARE UPDATE
Nurse called with critical calcium and CO2 level on BMP. I ordered an add on CMP, ionized calcium, and 50meq of Bicarb IV. Nurse states that patient has not been taking PO bicarb supplementation due to NPO status given colectomy. PRN calcium replacement orders already in Epic. Instructed nursing to contact nephrology regarding these critical results and for further recommendations as they are consulted on this patient's case.

## 2020-11-02 NOTE — CONSULTS
[facility]  Department of Cardiothoracic Surgery  Consult Note      PATIENT NAME: Alin Burkett  MRN: [id number]  TODAY'S DATE: 11/02/2020  ADMIT DATE: 10/15/2020                                                                 CONSULT REQUESTED BY: Brennan Decker MD      PRINCIPAL PROBLEM: Lower GI bleeding      REASON FOR CONSULT: Dialysis access        HPI:70 year old male with DIVINE  Patient seen and chart reviewed.   Referred now for placement temporary access for initiation of hemodialysis        Review of patient's allergies indicates:  No Known Allergies    REVIEW OF SYSTEMS  CARDIOVASCULAR: No recent chest pain, palpitations, arm, neck, or jaw pain  RESPIRATORY: No recent fever, cough chills, SOB or congestion  : No blood in the urine  GI: No Nausea, vomiting, constipation, diarrhea, blood, or reflux  MUSCULOSKELETAL: No myalgias  NEURO: No lightheadedness or dizziness  EYES: No Double vision, blurry, vision or headache       VITAL SIGNS (Most Recent)  Temp: 98.3 °F (36.8 °C) (11/02/20 1130)  Pulse: 104 (11/02/20 1415)  Resp: (!) 27 (11/02/20 1415)  BP: (!) 140/77 (11/02/20 1415)  SpO2: 98 % (11/02/20 1415)    VENTILATION STATUS  Resp: (!) 27 (11/02/20 1415)  SpO2: 98 % (11/02/20 1415)       I & O (Last 24H):    Intake/Output Summary (Last 24 hours) at 11/2/2020 1521  Last data filed at 11/2/2020 1500  Gross per 24 hour   Intake 8263.91 ml   Output 3384 ml   Net 4879.91 ml       WEIGHTS  Wt Readings from Last 1 Encounters:   10/29/20 0532 80.1 kg (176 lb 9.4 oz)   10/24/20 0600 83.5 kg (184 lb 1.4 oz)   10/22/20 0600 78.7 kg (173 lb 8 oz)   10/21/20 0700 78.3 kg (172 lb 9.9 oz)   10/20/20 1256 68.1 kg (150 lb 2.1 oz)   10/17/20 0630 68.1 kg (150 lb 2.1 oz)   10/15/20 2230 66.9 kg (147 lb 7.8 oz)   10/15/20 1440 68 kg (150 lb)       PHYSICAL EXAM  CONSTITUTIONAL: Well built, well nourished in no apparent distress  NECK: no carotid bruit, no JVD  LUNGS: CTA  CHEST WALL: No tenderness  HEART:  regular rate and rhythm, S1, S2 normal, no murmur, click, rub or gallop   ABDOMEN: soft, non-tender; bowel sounds normal; no masses, no organomegaly  EXTREMITIES: Extremities normal, no edema  NEURO: AAO X 3    MEDICATIONS   SCHEDULED MEDS:   allopurinoL  100 mg Oral Daily    brimonidine-timoloL  1 drop Both Eyes Q12H    calcitRIOL  0.5 mcg Oral Daily    calcium gluc in NaCl, iso-osm  1 g Intravenous Once    ceFEPime (MAXIPIME) IVPB  1 g Intravenous Q12H    cholestyramine-aspartame  1 packet Oral TID    fat emulsion 20%  250 mL Intravenous Daily    ferrous sulfate  325 mg Oral BID    fidaxomicin  200 mg Oral BID    levothyroxine  100 mcg Oral Before breakfast    metronidazole  500 mg Intravenous Q8H    micafungin (MYCAMINE) IVPB  100 mg Intravenous Q24H    midodrine  10 mg Oral TID    potassium chloride  20 mEq Oral BID    sodium bicarbonate  650 mg Oral TID    sucralfate  1 g Oral QID (AC & HS)    tacrolimus  2 mg Oral Daily PM    tacrolimus  3 mg Oral Daily AM    travoprost  1 drop Both Eyes QHS       CONTINUOUS INFUSIONS:   sodium chloride 0.9% 10 mL/hr at 10/31/20 1538    Amino acid 4.25% - dextrose 5% (CLINIMIX-E) solution with additives (1L provides 42.5 gm AA, 50 gm CHO (170 kcal/L dextrose), Na 35, K 30, Mg 5, Ca 4.5, Acetate 70, Cl 39, Phos 15) 85 mL/hr at 11/01/20 2223    Amino acid 4.25% - dextrose 5% (CLINIMIX-E) solution with additives (1L provides 42.5 gm AA, 50 gm CHO (170 kcal/L dextrose), Na 35, K 30, Mg 5, Ca 4.5, Acetate 70, Cl 39, Phos 15)      phenylephrine 3.5 mcg/kg/min (11/02/20 1346)    sodium bicarbonate drip 125 mL/hr at 11/02/20 0653       PRN MEDS:sodium chloride, albuterol-ipratropium, calcium gluconate IVPB, calcium gluconate IVPB, calcium gluconate IVPB, dicyclomine, heparin (porcine), hyoscyamine, magnesium sulfate IVPB, magnesium sulfate IVPB, metoclopramide HCl, morphine, potassium chloride in water **AND** potassium chloride in water **AND** potassium  chloride in water, promethazine, sodium chloride 0.9%, sodium chloride 0.9%, sodium chloride 0.9%, Pharmacy to dose Vancomycin consult **AND** vancomycin - pharmacy to dose    LABS AND DIAGNOSTICS   CBC LAST 3 DAYS  Recent Labs   Lab 10/29/20  0444 10/30/20  0448  10/31/20  0656  11/01/20  1121 11/01/20  2036 11/02/20  0335 11/02/20  0901   WBC 5.73 5.29   < > 4.30  4.30   < > 4.00 8.03 21.70* 22.70*   RBC 2.88* 2.83*   < > 2.27*  2.27*   < > 2.78* 2.45*  --  2.26*   HGB 9.3* 9.2*   < > 7.3*  7.3*   < > 8.8* 7.2* 7.9* 7.2*   HCT 28.5* 27.7*   < > 22.8*  22.8*   < > 26.4* 22.5*  --  19.9*   MCV 99* 98   < > 100*  100*   < > 95 92  --  88   MCH 32.3* 32.5*   < > 32.2*  32.2*   < > 31.7* 29.4  --  31.9*   MCHC 32.6 33.2   < > 32.0  32.0   < > 33.3 32.0  --  36.2*   RDW 17.2* 16.8*   < > 17.2*  17.2*   < > 17.3* 17.1*  --  17.7*    202   < > 194  194   < > 145* 141*  --  155   MPV 11.3 11.8   < > 11.9  11.9   < > 11.6 11.2  --  11.6   GRAN 67.7  3.9 61.0   < > 55.0  --  57.0 68.0  --   --    LYMPH 19.0  1.1 27.0  CANCELED   < > 28.0  --  21.0 10.0*  CANCELED  --   --    MONO 11.0  0.6 7.0  CANCELED   < > 7.0  --  3.0* 7.0  CANCELED  --   --    BASO 0.03 CANCELED  --   --   --   --  CANCELED  --   --    NRBC 0 0   < > 0  --  0 1*  --   --     < > = values in this interval not displayed.       COAGULATION LAST 3 DAYS  Recent Labs   Lab 10/30/20  1733 11/01/20  1121   INR 1.1 1.2   APTT  --  35.7*       CHEMISTRY LAST 3 DAYS  Recent Labs   Lab 11/01/20  0424 11/01/20  1121 11/01/20  1802 11/02/20  0335 11/02/20  0522 11/02/20  1140     --   --  135* 134*  --    K 3.1*  --  4.1 5.0 5.1  --    *  --   --  120* 120*  --    CO2 10*  --   --  <5* <5*  --    ANIONGAP 7*  --   --  Unable to calculate Unable to calculate  --    BUN 34*  --   --  37* 38*  --    CREATININE 1.8*  --   --  2.3* 2.4*  --      --   --  137* 121*  --    CALCIUM 7.3* 6.9*  --  6.7* 6.7*  --    PH  --   --   --    --   --  7.332*   ALBUMIN  --   --   --   --  1.7*  --    PROT  --   --   --   --  4.3*  --    ALKPHOS  --   --   --   --  41*  --    ALT  --   --   --   --  14  --    AST  --   --   --   --  47*  --    BILITOT  --   --   --   --  0.8  --        CARDIAC PROFILE LAST 3 DAYS  No results for input(s): BNP, CPK, CPKMB, LDH, TROPONINI in the last 168 hours.    ENDOCRINE LAST 3 DAYS  No results for input(s): TSH, PROCAL in the last 168 hours.    LAST ARTERIAL BLOOD GAS  ABG  Recent Labs   Lab 11/02/20  1140   PH 7.332*   PO2 110*   PCO2 15.3*   HCO3 8.1*   BE -18       LAST 7 DAYS MICROBIOLOGY   Microbiology Results (last 7 days)     Procedure Component Value Units Date/Time    Stool culture [[id number]] Collected: 10/31/20 0050    Order Status: Completed Specimen: Stool Updated: 11/02/20 1235     Stool Culture Culture in progress      Nothing significant to date    Stool culture [[id number]] Collected: 10/31/20 1715    Order Status: Completed Specimen: Stool Updated: 11/02/20 1148     Stool Culture Nothing significant to date    Narrative:      [id number]    E. coli 0157 antigen [[id number]] Collected: 10/31/20 0050    Order Status: Completed Specimen: Stool Updated: 11/02/20 1123     Shiga Toxin 1 E.coli Negative     Shiga Toxin 2 E.coli Negative    E. coli 0157 antigen [[id number]] Collected: 10/31/20 1715    Order Status: Completed Specimen: Stool Updated: 11/02/20 1108     Shiga Toxin 1 E.coli Negative     Shiga Toxin 2 E.coli Negative    Narrative:      [id number]    Blood culture [[id number]] Collected: 10/30/20 2022    Order Status: Completed Specimen: Blood Updated: 11/02/20 0613     Blood Culture, Routine No Growth to date      No Growth to date      No Growth to date    Narrative:      Change Woods needle and then draw blood culture from the  portacath    C Diff Toxin by PCR [[id number]] Collected: 10/31/20 1715    Order Status: Completed Updated: 11/01/20 1440     C. diff PCR Negative    Narrative:      [id number]    Urine Culture  High Risk [[id number]]  (Abnormal)  (Susceptibility) Collected: 10/29/20 1744    Order Status: Completed Specimen: Urine, Clean Catch Updated: 11/01/20 1350     Urine Culture, Routine PSEUDOMONAS AERUGINOSA  >100,000 cfu/ml      Narrative:      Indicated criteria for high risk culture:->Other  Other (specify):->transplant pt    Blood culture [[id number]]  (Abnormal)  (Susceptibility) Collected: 10/28/20 1728    Order Status: Completed Specimen: Blood Updated: 11/01/20 1050     Blood Culture, Routine Gram stain aer bottle: Gram positive cocci in clusters resembling Staph       Results called to and read back by: Fifi Li, Charge Nurse       10/30/2020  19:25      STAPHYLOCOCCUS EPIDERMIDIS    Narrative:      DRAW FROM PORTACAT    Clostridium difficile EIA [[id number]]  (Abnormal) Collected: 10/31/20 1715    Order Status: Completed Specimen: Stool Updated: 11/01/20 0133     C. diff Antigen Positive     C difficile Toxins A+B, EIA Negative     Comment: Testing not recommended for children <24 months old.       Narrative:      [id number]    Urine culture [[id number]]  (Abnormal)  (Susceptibility) Collected: 10/28/20 1831    Order Status: Completed Specimen: Urine Updated: 10/31/20 0937     Urine Culture, Routine PSEUDOMONAS AERUGINOSA  > 100,000 cfu/ml      Narrative:      Specimen Source->Urine          MOST RECENT IMAGING  X-Ray Chest 1 View  Narrative: EXAMINATION:  XR CHEST 1 VIEW    CLINICAL HISTORY:  trialysis cath placement;    TECHNIQUE:  Single frontal view of the chest was performed.    COMPARISON:  10/21/2020    FINDINGS:  A left IJ Port-A-Cath has its tip at the cavoatrial junction.  There is also a left IJ central line with its tip at the cavoatrial junction.  Multiple vascular stents are present in the left axillary region.  The cardiomediastinal silhouette is with normal limits.  There is persistent opacification at the left lung base without significant change.  Impression: Appropriately positioned left IJ  central line without complication.  Unchanged left basilar consolidation or atelectasis.    Electronically signed by: Kieran Inman MD  Date:    11/02/2020  Time:    15:14      LASTECHO  Results for orders placed in visit on 07/24/20   Echo Color Flow Doppler? Yes    Narrative · Concentric left ventricular remodeling.  · Hyperdynamic left ventricular systolic function. The estimated ejection   fraction is 85%.  · The left ventricular global longitudinal strain is -15%. reduced  · No wall motion abnormalities.  · Grade I (mild) left ventricular diastolic dysfunction consistent with   impaired relaxation.  · Normal right ventricular systolic function.  · Mild right atrial enlargement.  · Mild mitral regurgitation.  · Mild tricuspid regurgitation.  · Normal central venous pressure (3 mmHg).  · The estimated PA systolic pressure is 36 mmHg.  · Systolic anterior motion of the anterior leaflet of the Mitral valve is   noted.  · Since previous echo of 8/20/19, there is now evidence of FRANCISCO.          CURRENT/PREVIOUS VISIT EKG  Results for orders placed or performed during the hospital encounter of 10/15/20   EKG 12-lead    Collection Time: 10/15/20  3:39 PM    Narrative    Test Reason : R00.0,    Vent. Rate : 103 BPM     Atrial Rate : 103 BPM     P-R Int : 182 ms          QRS Dur : 088 ms      QT Int : 342 ms       P-R-T Axes : 042 030 153 degrees     QTc Int : 448 ms    Sinus tachycardia  ST and T wave abnormality, consider lateral ischemia  Abnormal ECG  When compared with ECG of 06-AUG-2020 06:50,  Nonspecific T wave abnormality has replaced inverted T waves in Inferior  leads  T wave inversion less evident in Lateral leads  Confirmed by Kp Oscar MD ([id number]) on 10/16/2020 6:05:59 PM    Referred By: [email or web address]   SELF           Confirmed By:Kp Oscar MD         HOSPITAL PROBLEMS WITH ASSESSMENT & PLAN:     Active Hospital Problems    Diagnosis    *Lower GI bleeding    Septic shock    Iron deficiency     Anemia in stage 3 chronic kidney disease    Hypotension    Moderate malnutrition    Anemia due to chemotherapy    Colitis due to Clostridium difficile    Hypokalemia due to excessive gastrointestinal loss of potassium    Post-transplant lymphoproliferative disorder (PTLD)    Acute renal failure superimposed on stage 3 chronic kidney disease    -donor kidney transplant     Induced w Campath 30 mg IV intraoperatively & SoluMedrol 875 mg total over 3 days.   Renal allograft biopsy 17 (DIVINE): 21 glomeruli, none globally sclerosed, <5% interstitial fibrosis, no ACR, c4d negative, AVR CCT Type 2 (V1 lesion); plan THYMO   Renal allograft biopsy 17 (follow-up after rejection treatment): 27 glomeruli, no AVR, no ACR, C4d negative, no microcirculation changes, 5-10% interstitial fibrosis; ?cast --> further studies pending      Acquired hypothyroidism    Metabolic acidosis       ASSESSMENT & PLAN:  DIVINE      RECOMMENDATIONS:  Will place acute hemodialysis catheter    Thank you for the consult.       Freddie Miller MD  [facility]  Department of Cardiothoracic Surgery  Date of Service: 2020  3:21 PM

## 2020-11-02 NOTE — PROGRESS NOTES
"[facility] Medicine  Progress Note    Patient Name: Alin Burkett  MRN: [id number]  Patient Class: IP- Inpatient   Admission Date: 10/15/2020  Length of Stay: 17 days  Attending Physician: Brennan Decker MD  Primary Care Provider: Carmen Krueger MD        Subjective:     Principal Problem:Lower GI bleeding        HPI:  Patient has been having diarrhea with "jelly-like" consistency as well as crampiness in the abdomen.  Symptoms began roughly one week ago.  Also has had fever and malaise.  Appetite poor.  Fatigue as well.  He has been receiving chemo for PTLD; most recent cycle of CHOP was end of last week.  He has history of renal transplant four years ago and is currently on twice-a-day  Prograf.  He's had no cough, no unusual headache, no sinus pain, and no burning on urination.  He feels that he's probably dehydrated.    Overview/Hospital Course:  No notes on file    Interval History:  Large amount of blood in rectal tube bag.  Seen by colorectal surgeon.  Plan is colectomy today.  Given the high risk nature of the procedure and comorbid complexity, I attempted transferring the patient to [facility] this morning; however, the hospital is on critical care diversion.    Review of Systems   Constitutional: Positive for fatigue and fever. Negative for chills.   Respiratory: Negative for cough, shortness of breath and wheezing.    Cardiovascular: Negative for chest pain and leg swelling.   Gastrointestinal: Positive for abdominal pain and blood in stool. Negative for nausea.     Objective:     Vital Signs (Most Recent):  Temp: 98.7 °F (37.1 °C) (11/01/20 1755)  Pulse: (!) 111 (11/01/20 1905)  Resp: 17 (11/01/20 1905)  BP: (!) 83/52 (11/01/20 1905)  SpO2: 100 % (11/01/20 1905) Vital Signs (24h Range):  Temp:  [97.9 °F (36.6 °C)-99.2 °F (37.3 °C)] 98.7 °F (37.1 °C)  Pulse:  [100-121] 111  Resp:  [16-28] 17  SpO2:  [86 %-100 %] 100 %  BP: ()/() 83/52  Arterial Line BP: " ()/() 92/60     Weight: 80.1 kg (176 lb 9.4 oz)  Body mass index is 24.63 kg/m².    Intake/Output Summary (Last 24 hours) at 11/1/2020 1939  Last data filed at 11/1/2020 1900  Gross per 24 hour   Intake 9381.58 ml   Output 5200 ml   Net 4181.58 ml      Physical Exam  Vitals signs reviewed.   Constitutional:       General: He is not in acute distress.     Appearance: He is ill-appearing. He is not diaphoretic.   HENT:      Mouth/Throat:      Pharynx: No oropharyngeal exudate.   Eyes:      General: No scleral icterus.        Right eye: No discharge.         Left eye: No discharge.   Neck:      Vascular: No JVD.   Cardiovascular:      Rate and Rhythm: Normal rate and regular rhythm.   Pulmonary:      Effort: Pulmonary effort is normal.      Breath sounds: Normal breath sounds.   Abdominal:      General: Bowel sounds are normal. There is no distension.      Palpations: Abdomen is soft.      Tenderness: There is abdominal tenderness in the epigastric area and left lower quadrant.   Skin:     General: Skin is warm.      Findings: No rash.   Neurological:      Mental Status: He is alert.         Significant Labs: All pertinent labs within the past 24 hours have been reviewed.    Significant Imaging: I have reviewed all pertinent imaging results/findings within the past 24 hours.      Assessment/Plan:      * Lower GI bleeding  Colonoscopy showed extensive Cdiff colitis with bleeding.  Monitor HGB.  Transfuse as needed.  Surgical treatment is indicated.  Will need colectomy.        Hypotension  BP in acceptable range.  Will continue midodrine      Moderate malnutrition  Nutrition consulted. Body mass index is 24.63 kg/m².. Encourage maximal PO intake. Diet supplementation ordered per nutrition approval. Will encourage PO and monitor closely for weight changes.  We are supplementing calorie intake through parenteral nutrition.      Anemia due to chemotherapy  Patient's anemia is currently uncontrolled.  There is  anemia of acute blood loss on top of anemia from chemo.       Ref. Range 10/31/2020 06:56 10/31/2020 06:56 10/31/2020 12:41 10/31/2020 23:39 2020 04:24   Hemoglobin Latest Ref Range: 14.0 - 18.0 g/dL 7.3 (L) 7.3 (L) 6.7 (L) 12.0 (L) 10.2 (L)     Monitor serial CBC and transfuse if patient becomes hemodynamically unstable, symptomatic or H/H drops below 7/21.         Hypokalemia due to excessive gastrointestinal loss of potassium  Give supplement and monitor level.    Potassium   Date Value Ref Range Status   2020 4.1 3.5 - 5.1 mmol/L Final   2020 3.1 (L) 3.5 - 5.1 mmol/L Final         C. difficile colitis  ID and GI services following.  On multiple antimicrobials.  Endoscopic exam of colon reveals mucosal abnormality c/w Cdiff and bleeding from throughout colon.  ID consultant concerned about persistent bleeding from mucosa, and the disease is probably refractory to treatment.  FMT is a consideration, but FDA may have restrictions against its use at this time.  At this stage, surgical treatment of the infection is indicated.  CRS consulted and is planning either total or subtotal colectomy.    Post-transplant lymphoproliferative disorder (PTLD)  Has been receiving chemo for this.      Acute renal failure superimposed on stage 3 chronic kidney disease  Improving.  Continue monitoring Cr.  Keep MAP > 55.    Lab Results   Component Value Date    CREATININE 1.8 (H) 2020             -donor kidney transplant  Continue Prograf at usual dose.  I communicated by secure messaging with Dr. Mcclain, the patient's transplant nephrologist at [facility].  Her recommendation was to keep the trough level around 3 to 4.  Will check Tr level tomorrow morning.      Acquired hypothyroidism  Continue levothyroxine.    Metabolic acidosis  Due to bicarb loss through GI tract.  Continue bicarb supplement.    CO2   Date Value Ref Range Status   2020 10 (L) 23 - 29 mmol/L Final         VTE Risk Mitigation (From  admission, onward)         Ordered     IP VTE LOW RISK PATIENT  Once      10/15/20 2220                Discharge Planning   TRINH: 10/26/2020     Code Status: Full Code   Is the patient medically ready for discharge?: Yes    Reason for patient still in hospital (select all that apply): Patient trending condition, Laboratory test and Treatment  Discharge Plan A: Home with family              Brennan Decker MD  Department of Hospital Medicine   [facility]

## 2020-11-02 NOTE — NURSING
Called and spoke with Dr. Garcia regarding pt's UO, breath sounds, and volume.  She stated she would be here within the next 30 minutes to see pt.

## 2020-11-02 NOTE — ASSESSMENT & PLAN NOTE
Patient's anemia is currently uncontrolled.  There is anemia of acute blood loss on top of anemia from chemo.       Ref. Range 10/31/2020 06:56 10/31/2020 06:56 10/31/2020 12:41 10/31/2020 23:39 11/1/2020 04:24   Hemoglobin Latest Ref Range: 14.0 - 18.0 g/dL 7.3 (L) 7.3 (L) 6.7 (L) 12.0 (L) 10.2 (L)     Monitor serial CBC and transfuse if patient becomes hemodynamically unstable, symptomatic or H/H drops below 7/21.

## 2020-11-02 NOTE — TELEPHONE ENCOUNTER
----- Message from Gwendolyn Newby sent at 11/2/2020  2:36 PM CST -----  Contact: Jayden (spouse)  Patient Advice/Staff Message     Caller name/title: Jayden (spouse)    Provider: John    Reason for call: Wants to speak with Ankita, wouldn't say what it was regarding only that it's important.    Do you feel you need to be seen today:: No        Communication Preference: [phone]    Additional Information:

## 2020-11-02 NOTE — NURSING
VORB to order CBC now stat and daily; cancel hemoglobin.  Transfuse 2 units PRBCs if hemoglobin is <7.5.  Okay to give PO meds

## 2020-11-02 NOTE — ASSESSMENT & PLAN NOTE
Give supplement and monitor level.    Potassium   Date Value Ref Range Status   11/01/2020 4.1 3.5 - 5.1 mmol/L Final   11/01/2020 3.1 (L) 3.5 - 5.1 mmol/L Final

## 2020-11-02 NOTE — TELEPHONE ENCOUNTER
Spoke with patient wife. She asked if [facility] is still on diversion. Explained I am not able to see in patient census and it is best to utilize the inpatient team to call the transfer center to discuss transfer

## 2020-11-02 NOTE — PLAN OF CARE
VSS, pain med given, dressing to abdomen dry and intact, 2 RIO drains to abdomen intact, ileostomy with bag intact, fournier catheter intact and flowing freely, sallie SCDs on. Pt's wife at the bedside. Call light within reach. Report given to BRUNO Hernandez.

## 2020-11-02 NOTE — NURSING
Attempted to call Dr. Decker concerning neosynephrine gtt; no answer. Upon auscultation, pt has coarse crackles and is not putting out much urine.  Pt will also be receiving PRBCs d/t low H&H.  Will continue to try to call him.

## 2020-11-02 NOTE — ASSESSMENT & PLAN NOTE
Improving.  Continue monitoring Cr.  Keep MAP > 55.    Lab Results   Component Value Date    CREATININE 1.8 (H) 11/01/2020

## 2020-11-02 NOTE — NURSING
Received report from [facility] in recovery. Patient is resting comfortably. Drsg is CD&I. Bilateral RIO drains emptied and charted. VSS. A-line in place. Resp are even and unlabored. Spo2 is stable on RA. Wife is at bedside and has been updated by MD. Safety maintained.

## 2020-11-02 NOTE — SUBJECTIVE & OBJECTIVE
Interval History:  Large amount of blood in rectal tube bag.  Seen by colorectal surgeon.  Plan is colectomy today.  Given the high risk nature of the procedure and comorbid complexity, I attempted transferring the patient to [facility] this morning; however, the hospital is on critical care diversion.    Review of Systems   Constitutional: Positive for fatigue and fever. Negative for chills.   Respiratory: Negative for cough, shortness of breath and wheezing.    Cardiovascular: Negative for chest pain and leg swelling.   Gastrointestinal: Positive for abdominal pain and blood in stool. Negative for nausea.     Objective:     Vital Signs (Most Recent):  Temp: 98.7 °F (37.1 °C) (11/01/20 1755)  Pulse: (!) 111 (11/01/20 1905)  Resp: 17 (11/01/20 1905)  BP: (!) 83/52 (11/01/20 1905)  SpO2: 100 % (11/01/20 1905) Vital Signs (24h Range):  Temp:  [97.9 °F (36.6 °C)-99.2 °F (37.3 °C)] 98.7 °F (37.1 °C)  Pulse:  [100-121] 111  Resp:  [16-28] 17  SpO2:  [86 %-100 %] 100 %  BP: ()/() 83/52  Arterial Line BP: ()/() 92/60     Weight: 80.1 kg (176 lb 9.4 oz)  Body mass index is 24.63 kg/m².    Intake/Output Summary (Last 24 hours) at 11/1/2020 1939  Last data filed at 11/1/2020 1900  Gross per 24 hour   Intake 9381.58 ml   Output 5200 ml   Net 4181.58 ml      Physical Exam  Vitals signs reviewed.   Constitutional:       General: He is not in acute distress.     Appearance: He is ill-appearing. He is not diaphoretic.   HENT:      Mouth/Throat:      Pharynx: No oropharyngeal exudate.   Eyes:      General: No scleral icterus.        Right eye: No discharge.         Left eye: No discharge.   Neck:      Vascular: No JVD.   Cardiovascular:      Rate and Rhythm: Normal rate and regular rhythm.   Pulmonary:      Effort: Pulmonary effort is normal.      Breath sounds: Normal breath sounds.   Abdominal:      General: Bowel sounds are normal. There is no distension.      Palpations: Abdomen is soft.       Tenderness: There is abdominal tenderness in the epigastric area and left lower quadrant.   Skin:     General: Skin is warm.      Findings: No rash.   Neurological:      Mental Status: He is alert.         Significant Labs: All pertinent labs within the past 24 hours have been reviewed.    Significant Imaging: I have reviewed all pertinent imaging results/findings within the past 24 hours.

## 2020-11-02 NOTE — PLAN OF CARE
11/02/20 0805   PRE-TX-O2   O2 Device (Oxygen Therapy) nasal cannula   $ Is the patient on Low Flow Oxygen? Yes   Flow (L/min) 4   SpO2 100 %   Pulse Oximetry Type Continuous   Pulse 96   Resp (!) 28   BP (!) 77/47   Aerosol Therapy   $ Aerosol Therapy Charges PRN treatment not required

## 2020-11-02 NOTE — ANESTHESIA POSTPROCEDURE EVALUATION
Anesthesia Post Evaluation    Patient: Alin Burkett    Procedure(s) Performed: Procedure(s) (LRB):  LAPAROTOMY, EXPLORATORY (N/A)  COLON RESECTION (N/A)  COLECTOMY, TOTAL, ABDOMINAL (N/A)  CHOLECYSTECTOMY (N/A)  CREATION, ILEOSTOMY (Right)    Final Anesthesia Type: general    Patient location during evaluation: PACU  Patient participation: Yes- Able to Participate  Level of consciousness: awake and alert  Post-procedure vital signs: reviewed and stable  Pain management: adequate  Airway patency: patent    PONV status at discharge: No PONV  Anesthetic complications: no      Cardiovascular status: blood pressure returned to baseline and tachycardic  Respiratory status: unassisted  Hydration status: euvolemic  Follow-up not needed.          Vitals Value Taken Time   BP 86/59 11/01/20 1827   Temp 37.1 °C (98.7 °F) 11/01/20 1755   Pulse 112 11/01/20 1831   Resp 16 11/01/20 1831   SpO2 100 % 11/01/20 1831   Vitals shown include unvalidated device data.      Event Time   Out of Recovery 11/01/2020 18:00:00         Pain/Raheem Score: Pain Rating Prior to Med Admin: 4 (11/1/2020  5:50 PM)  Pain Rating Post Med Admin: 0 (11/1/2020  5:55 PM)  Raheem Score: 9 (11/1/2020  5:45 PM)

## 2020-11-02 NOTE — PT/OT/SLP DISCHARGE
Occupational Therapy Discharge Summary    Alin Burkett  MRN: [id number]   Principal Problem: Lower GI bleeding      Patient Discharged from acute Occupational Therapy on 11/2/20.    Assessment:      Transfer to ICU    Objective:     GOALS:   Multidisciplinary Problems     Occupational Therapy Goals        Problem: Occupational Therapy Goal    Goal Priority Disciplines Outcome Interventions   Occupational Therapy Goal     OT, PT/OT Ongoing, Progressing    Description: Goals to be met by: 11/6/2020    Patient will increase functional independence with ADLs by performing:    UE Dressing with Set-up Assistance.  LE Dressing with Minimal Assistance.  Grooming while seated with Set-up Assistance.  Toileting from toilet with Minimal Assistance for hygiene and clothing management.   Sitting at edge of bed x 15 minutes with Supervision.  Toilet transfer to toilet with Minimal Assistance.                     Reasons for Discontinuation of Therapy Services  Transfer to ICU.      Plan:     Patient Discharged to: In house    MARKELL Fournier LOTR  11/2/2020

## 2020-11-02 NOTE — PROGRESS NOTES
[facility] Note    CC: Fulminant colitis    HPI 70 y.o. male with multiple medical problems including SVT, s/p renal transplant with CKD, diffuse large B cell lymphoma (complete RCHOP 2-3 weeks ago), who was admitted with severe, persistent, diarrhea due to C.diff 2 weeks ago, initially associated with significant abdominal pain. He had been improving however now reports worsening of diarrhea as well as blood in stool (in rectal tube). He continues to have mild lower abdominal cramping and decreased appetite (on TPN). He denies prior PUD or GI bleeding and his last colonoscopy was in  and was diverticulosis of sigmoid, descending and transverse colon     INTERVAL HISTORY:  Patient had colonoscopy with photos reviewed.  Fulminant colitis noted.  Patient subsequently had TAC with end ileostomy and RHEA per Dr. Guerrero.  No new GI complaints.  Patient lying in bed resting with stable VS.  Abdomen with midline dressing in place.  RLQ stoma intact.  Bilateral RIO drains with serosanguinous fluid.     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  Renal allograft biopsy 17 (DIVINE): 21 glomeruli, none globally sclerosed, <5% interstitial fibrosis, no ACR, c4d negative, AVR CCT Type 2 (V1 lesion); plan THYMO     Dissecting aortic aneurysm (any part), abdominal     Diverticulosis     Encounter for blood transfusion     ESRD (end stage renal disease) 2010  "   Febrile neutropenia 10/15/2020    H/O urethral stricture 11/27/2018    H/O: urethral stricture     History of AAA (abdominal aortic aneurysm) repair     History of urethral stricture 12/19/2018    Hypertension     Hypokalemia     Hypothyroidism 1/10/2014    Inguinal hernia bilateral, non-recurrent     Kidney stones     Organ transplant candidate 11/26/2013    Plantar warts 1/10/2014    Recurrent UTI 7/28/2017    S/P kidney transplant     Secondary hyperparathyroidism, renal     Sepsis 10/24/2020    Thyroid disease          Review of Systems  Unable to obtain secondary to patient resting at this time.    Physical Examination  BP (!) 140/77   Pulse 104   Temp 98.3 °F (36.8 °C) (Axillary)   Resp (!) 27   Ht 5' 11" (1.803 m)   Wt 80.1 kg (176 lb 9.4 oz)   SpO2 98%   BMI 24.63 kg/m²   General appearance: resting, hemodynamically stable.  HENT: Normocephalic, atraumatic, neck symmetrical, no nasal discharge, sclera anicteric   Lungs: coarse to auscultation bilaterally, symmetric chest wall expansion bilaterally  Heart: regular rate and rhythm without rub; no displacement of the PMI   Abdomen: Abdomen with midline dressing in place.  RLQ stoma intact.  Bilateral RIO drains with serosanguinous fluid.   Extremities: extremities symmetric      Labs:  Lab Results   Component Value Date    WBC 22.70 (H) 11/02/2020    HGB 7.2 (L) 11/02/2020    HCT 19.9 (LL) 11/02/2020    MCV 88 11/02/2020     11/02/2020         CMP  Sodium   Date Value Ref Range Status   11/02/2020 134 (L) 136 - 145 mmol/L Final     Potassium   Date Value Ref Range Status   11/02/2020 5.1 3.5 - 5.1 mmol/L Final     Chloride   Date Value Ref Range Status   11/02/2020 120 (H) 95 - 110 mmol/L Final     CO2   Date Value Ref Range Status   11/02/2020 <5 (LL) 23 - 29 mmol/L Final     Comment:     calcium and CO2  critical result(s) called and verbal readback   obtained from Za Mora by FRANCESCA 11/02/2020 06:24       Glucose   Date " Value Ref Range Status   11/02/2020 121 (H) 70 - 110 mg/dL Final     BUN   Date Value Ref Range Status   11/02/2020 38 (H) 8 - 23 mg/dL Final     Creatinine   Date Value Ref Range Status   11/02/2020 2.4 (H) 0.5 - 1.4 mg/dL Final     Calcium   Date Value Ref Range Status   11/02/2020 6.7 (LL) 8.7 - 10.5 mg/dL Final     Comment:     calcium and CO2  critical result(s) called and verbal readback   obtained from Za Mora by FRANCESCA 11/02/2020 06:24       Total Protein   Date Value Ref Range Status   11/02/2020 4.3 (L) 6.0 - 8.4 g/dL Final     Albumin   Date Value Ref Range Status   11/02/2020 1.7 (L) 3.5 - 5.2 g/dL Final     Total Bilirubin   Date Value Ref Range Status   11/02/2020 0.8 0.1 - 1.0 mg/dL Final     Comment:     For infants and newborns, interpretation of results should be based  on gestational age, weight and in agreement with clinical  observations.  Premature Infant recommended reference ranges:  Up to 24 hours.............<8.0 mg/dL  Up to 48 hours............<12.0 mg/dL  3-5 days..................<15.0 mg/dL  6-29 days.................<15.0 mg/dL       Alkaline Phosphatase   Date Value Ref Range Status   11/02/2020 41 (L) 55 - 135 U/L Final     AST   Date Value Ref Range Status   11/02/2020 47 (H) 10 - 40 U/L Final     ALT   Date Value Ref Range Status   11/02/2020 14 10 - 44 U/L Final     Anion Gap   Date Value Ref Range Status   11/02/2020 Unable to calculate 8 - 16 mmol/L Final     eGFR if    Date Value Ref Range Status   11/02/2020 31 (A) >60 mL/min/1.73 m^2 Final     eGFR if non    Date Value Ref Range Status   11/02/2020 27 (A) >60 mL/min/1.73 m^2 Final     Comment:     Calculation used to obtain the estimated glomerular filtration  rate (eGFR) is the CKD-EPI equation.              Assessment:   1.  C.difficile  2.  Fulminant colitis s/p colectomy and end ileostomy  3.  CKD  4.  DLBCL    Plan:  1.  Continue current care per primary team  2.  Ongoing management  per surgery  3.  Monitor for any bleeding.  No further acute GI intervention at this time.  4.  GI to sign off for now.  Call for questions.    Dayo Deutsch MD  [facility]  [address], [address]  [place] [address]  Office: [phone]  Fax: [phone]

## 2020-11-02 NOTE — PLAN OF CARE
Trialysis cath placed this afternoon.  Pt currently receiving dialysis treatment.  Poor UO. Remains on Mitesh gtt. Receiving a total of 2 units PRBCs during dialysis.  RIO drains are emptied and documented Q2-3 per Dr. Guerrero.  Taken off c.diff precautions per infectious disease and rooms switched.  Spouse at bedside.  POC discussed. Safety maintained entire shift.

## 2020-11-02 NOTE — NURSING
Dr. Garcia at bedside.  Pt consents to dialysis.  Dr. Garcia wants to wait to transfuse pt until dialysis.  She is going to call Dr. Miller to inform him that pt needs temporary access.

## 2020-11-03 NOTE — PLAN OF CARE
CM continuing to follow pt for discharge planning. Pt's not medically clear at this time due to drop in h/h. Pt's insurance originally denied LTAC. Pt now requiring hemodialysis as of 11/2. CM spoke with [facility] and requested to resubmit to insurance. CM sent updated 3 day packet via [employer]. Back up plan would be SNF VS home health. CM following.         11/03/20 0606   Discharge Assessment   Assessment Type Discharge Planning Reassessment   Discharge Plan A Long-term acute care facility (LTAC)   Discharge Plan B Return to Nursing Home

## 2020-11-03 NOTE — PROGRESS NOTES
"INPATIENT NEPHROLOGY PROGRESS NOTE  [facility] NEPHROLOGY    Alin Burkett  11/03/2020    Reason for consultation:  Acute kidney injury     Chief Complaint   Patient presents with    Diarrhea     for the past 3 days,last received chemotherapy 6 days ago.     Abdominal Pain     History of Present Illness:  Patient has been having diarrhea with "jelly-like" consistency as well as crampiness in the abdomen. Symptoms began roughly one week ago.  Also has had fever and malaise.  Appetite poor.  Fatigue as well.  He has been receiving chemo for PTLD; most recent cycle of CHOP was end of last week.  He has history of renal transplant four years ago and is currently on twice-a-day  Prograf.  He's had no cough, no unusual headache, no sinus pain, and no burning on urination.     10/20  Has some diarrhea and abdominal pain.  No nausea, chest pain, sob, new neuro symptoms, new joint pain.  He is very weak.    I told him that he had previously been seen by doctor Shonda and I would call him to see him.  He stated he didn't want to see Dr Merchant and requested that I become his nephrologist.    10/21  Not much change in renal function since yesterday. UOP 1.3L.  Sleeping quietly.  10/23  Sleeping.  1550 urine output.    10/24 VSS, no new complains. Appreciate ID and Heme input.  10/25 VSS, no new complains. sCr is better.  10/26 VSS, no new complains. Labs are acceptable. Lethargic at times.  10/27 VSS, no new complains.  10/28 VSS, no new complains. Needs to eat bettter.  10/29 VSS, no new complains.   10/30 VSS, no new complains. Still having blood-tinged diarrhea, treated for c.diff. On TPN due to very poor oral intake. Dr. Malave will cover after 5pm today and over the weekend.  10/31 sleeping.  AFVSS   11/1  Sleeping.  Hypotensive.  Tm 100.5- went for ex lap, TAC with end ileostomy and lysis of adhesion, almost 1L blood loss, transfused post OP  11/2 afebrile, tachy, BP better this AM but remains on darlene, on 4L NC, UOP 1.7L " but slowed down this AM, Hb drop- to get blood transfusion again today- c/w bloody output, also c/w weeping edema, WBC spiked to 22K  11/3- had to terminate HD last night due to hypotension- VSS, BP better, on O2 NC; off levophed, darlene; on bicarb gtt; got albumin yest; got blood products; UOP 300cc; resp issues overnight, improved some with morphine    Plan of Care:    1. Septic/Hemorraghic shock in setting of C diff colitis s/p TAC 11/1 with end ileostomy and significant lysis of adhesion  - He is off darlene and levophed. He is now on stress dose steroids.   - He is on midodrine. Giving albumin PRN.  - Will get blood with HD today.     2. Acute kidney injury 2/2 c.diff colitis  - Due to high obligate intake, weeping edema, dyspnea, need for more blood products, and metabolic derangements (hyponatremia, hyperkalemia, refractory acidemia), he was initiated on RRT on 11/2.  - He did not tolerate HD on 11/2 due to hypotension- terminated after 1.5 hours.  - Ordered 2nd HD treatment today.   - Dose meds for dialysis.  - No NSAIDs or IV contrast    3. Nongap metabolic acidosis likely combination of GI losses and renal tubular defect (urine pH inappropriately high)  - Continue dialysis and bicarb gtt but drop rate to 75cc/hr- recheck renal panel this evening- may be able to d/c bicarb gtt.  - He is on PO bicarb as well.      4. Hyperkalemia- resolved; now Hypokalemia  - Use 3K bath today.  - Continue oral KCl at current dose for now.    5. Hypocalcemia  - Repleting PRN with IV maritza gluc. Use 3Ca bath today.  - Phos is at goal.  - Continue calcitriol.    6. PTLD lymphoma, s/p renal transplant  7. Anemia- multifactorial  - Continue prograf.  - Plan to transfuse 2 units of blood with HD.  - Not sure if any benefit to oral iron- consider D/C.  - Heme/onc is following.    8. Anasarca  - Will attempt 500cc UF today.     He is critically ill with high risk for mortality- overall prognosis is quite poor.     Update:  HD tx complete,  tolerated well  2 units PRBC administered on HD  Net UF 500mL  Didn't require pressors or albumin.  Breathing is must better post HD.  Will cut back bicarb gtt as discussed above.  Remains with anasarca- push UF starting tomorrow.     Thank you for allowing us to participate in this patient's care. We will continue to follow.    Vital Signs:  Temp Readings from Last 3 Encounters:   11/03/20 96.9 °F (36.1 °C) (Axillary)   10/10/20 98.3 °F (36.8 °C) (Oral)   10/09/20 98.6 °F (37 °C)       Pulse Readings from Last 3 Encounters:   11/03/20 72   10/10/20 95   10/09/20 (!) 56       BP Readings from Last 3 Encounters:   11/03/20 (!) 153/79   10/10/20 102/63   10/09/20 115/76       Weight:  Wt Readings from Last 3 Encounters:   10/29/20 80.1 kg (176 lb 9.4 oz)   10/10/20 68.5 kg (151 lb)   10/09/20 66.4 kg (146 lb 6.2 oz)     Medications:  No current facility-administered medications on file prior to encounter.      Current Outpatient Medications on File Prior to Encounter   Medication Sig Dispense Refill    allopurinoL (ZYLOPRIM) 300 MG tablet Take 1 tablet (300 mg total) by mouth once daily. 30 tablet 2    aspirin (ECOTRIN) 81 MG EC tablet Take 1 tablet (81 mg total) by mouth once daily.  0    calcitRIOL (ROCALTROL) 0.5 MCG Cap Take 1 capsule (0.5 mcg total) by mouth once daily. 30 capsule 11    cefpodoxime (VANTIN) 100 MG tablet Take 1 tablet (100 mg total) by mouth every 12 (twelve) hours. 60 tablet 3    COMBIGAN 0.2-0.5 % Drop Place 1 drop into both eyes 2 (two) times a day.       famotidine (PEPCID) 20 MG tablet TAKE 1 TABLET EVERY EVENING (Patient taking differently: Take 20 mg by mouth every evening. ) 90 tablet 3    ketoconazole (NIZORAL) 200 mg Tab TAKE ONE-HALF (1/2) TABLET ONCE DAILY (Patient taking differently: Take 100 mg by mouth once daily. ) 45 tablet 3    levothyroxine (SYNTHROID) 100 MCG tablet TAKE 1 TABLET DAILY (Patient taking differently: Take 100 mcg by mouth before breakfast. Administer on  an empty stomach at least 30 minutes before food. If receiving tube feeds, HOLD tube feeds for 1 hour before and after levothyroxine administration.) 90 tablet 3    magnesium oxide (MAG-OX) 400 mg (241.3 mg magnesium) tablet Take 1 tablet (400 mg total) by mouth once daily. 90 tablet 3    multivitamin (ONE DAILY MULTIVITAMIN) per tablet Take 1 tablet by mouth once daily.      ondansetron (ZOFRAN-ODT) 8 MG TbDL Dissolve 1 tablet (8 mg total) by mouth every 12 (twelve) hours as needed. (Patient taking differently: Take 8 mg by mouth every 12 (twelve) hours as needed (nausea). ) 21 tablet 1    predniSONE (DELTASONE) 50 MG Tab Take 2 tablets (100 mg total) by mouth once daily. Take on days 2-5 of your chemotherapy cycles. 8 tablet 0    sodium bicarbonate 650 MG tablet Take 1 tablet (650 mg total) by mouth 2 (two) times daily. 540 tablet 3    tacrolimus (PROGRAF) 1 MG Cap Take 3 capsules (3 mg total) by mouth every morning AND 2 capsules (2 mg total) every evening. Z94.0/Kidney Transplant on 11/26/16. 150 capsule 11    travoprost (TRAVATAN Z) 0.004 % ophthalmic solution Place 1 drop into both eyes every evening.        Scheduled Meds:   albumin human 25%  25 g Intravenous Once    allopurinoL  100 mg Oral Daily    brimonidine-timoloL  1 drop Both Eyes Q12H    calcitRIOL  0.5 mcg Oral Daily    calcium gluc in NaCl, iso-osm  1 g Intravenous Once    ceFEPime (MAXIPIME) IVPB  1 g Intravenous Q12H    cholestyramine-aspartame  1 packet Oral TID    ferrous sulfate  325 mg Oral BID    fidaxomicin  200 mg Oral BID    hydrocortisone sod succ (PF)  500 mg Intravenous Q6H    levothyroxine  50 mcg Intravenous Daily    metronidazole  500 mg Intravenous Q8H    micafungin (MYCAMINE) IVPB  100 mg Intravenous Q24H    midodrine  10 mg Oral TID    potassium chloride  20 mEq Oral BID    sodium bicarbonate  650 mg Oral TID    sucralfate  1 g Oral QID (AC & HS)    tacrolimus  2 mg Oral Daily PM    tacrolimus  3 mg Oral  "Daily AM    travoprost  1 drop Both Eyes QHS     Continuous Infusions:   sodium chloride 0.9% 10 mL/hr at 10/31/20 1538    norepinephrine bitartrate-D5W Stopped (11/03/20 0043)    phenylephrine Stopped (11/02/20 2300)    sodium bicarbonate drip 125 mL/hr at 11/03/20 0941     PRN Meds:.sodium chloride, sodium chloride, sodium chloride, albuterol-ipratropium, calcium gluconate IVPB, calcium gluconate IVPB, calcium gluconate IVPB, dicyclomine, heparin (porcine), hyoscyamine, magnesium sulfate IVPB, magnesium sulfate IVPB, metoclopramide HCl, morphine, potassium chloride in water **AND** potassium chloride in water **AND** potassium chloride in water, promethazine, sodium chloride 0.9%, sodium chloride 0.9%, sodium chloride 0.9%, Pharmacy to dose Vancomycin consult **AND** vancomycin - pharmacy to dose    Allergies:  Patient has no known allergies.    Past Family History:  Reviewed; refer to Hospitalist Admission Note    Review of Systems:  Not interactive today    Physical Exam:    BP (!) 153/79   Pulse 72   Temp 96.9 °F (36.1 °C) (Axillary)   Resp 12   Ht 5' 11" (1.803 m)   Wt 80.1 kg (176 lb 9.4 oz)   SpO2 (!) 94%   BMI 24.63 kg/m²     Constitutional: resting, ill appearing, appears stated age, tachypnea better  Heart: rrr, no m/r/g, wwp, anasarca  Lungs: coarse, no w/r/r/c, no lb  Abdomen: s/nt/nd, +BS, + pink stoma    Results:  Lab Results   Component Value Date     11/03/2020    K 3.6 11/03/2020     11/03/2020    CO2 16 (L) 11/03/2020    BUN 36 (H) 11/03/2020    CREATININE 2.5 (H) 11/03/2020    CALCIUM 7.2 (L) 11/03/2020    ANIONGAP 13 11/03/2020    ESTGFRAFRICA 29 (A) 11/03/2020    EGFRNONAA 25 (A) 11/03/2020       Lab Results   Component Value Date    CALCIUM 7.2 (L) 11/03/2020    PHOS 4.7 (H) 11/03/2020       Recent Labs   Lab 11/03/20  0420   WBC 13.06*  13.06*   RBC 2.13*   HGB 6.3*   HCT 18.0*   *   MCV 85   MCH 29.6   MCHC 35.0       I have personally reviewed pertinent " radiological imaging and reports.    Roberto Carlos Conn MD MPH  [facility] [address] [address]  [phone] (p)  [phone] (f)

## 2020-11-03 NOTE — PROGRESS NOTES
HD: consent and hepatitis status confirmed prior to HD, pt stable, tx discontinued after 1.5hrs due to hypotension, pt not tolerating HD, Dr. Conn called, lines flushed, clamped, and capped. NET UF: -774mls (positive) Patient received 1 unit PRBCs on HD. Report given to Avery

## 2020-11-03 NOTE — PROGRESS NOTES
"Progress Note  Infectious Disease    Reason for Consult:  C difficile colitis, neutropenia, sepsis    HPI: Alin Burkett is a   70 y.o. male who is status post renal transplant and is receiving chemotherapy for diffuse large B-cell lymphoma, presented to the emergency room on 10/15 with worsening of chronic diarrhea, abdominal cramps of 1 weeks duration.  He was found to be neutropenic with a white blood cell count of 0.2, volume depleted, hypokalemic, was cultured and given fluid resuscitation and vancomycin and cefepime.  He was admitted to the intensive care unit. He was recently given cefpodoxime to take prophylactically associated with his chemotherapy for recurring urinary tract infections.  Most recent positive urine culture was September 18th with E coli sensitive to 3rd generation cephalosporins carbapenems  He has had a hectic fever, stool for C difficile toxin was obtained and was resulted as positive today.  he has had a positive C difficile test before, August 2019.  White blood cells remain depressed at 0.1, platelets are depressed at 91,000, creatinine 1.6.  CT scan of the abdomen obtained last night shows severe proctocolitis without megacolon. Neupogen has been ordered. He is discouraged from eating by severe cramps.      10/17/2020 tmax is improved. Continues to be neutropenic Continues to have cramping, intermittent, abdominal pain. + diarrhea "lost count how many" He had refused vancomycin in am but decided to take it now  10/18/2020 afebrile,(103 on 16th)  ANC improved on granix 0---1029) he is feeling much better today.  Less abdominal pain.  Less loose stools.  10/19/2020.  Afebrile.  T-max 99°.  Less need for pressors.  WBC 5.  Creatinine slightly worse at 1.9.  Discussed with nurse.  Patient may have had some hallucinations earlier  10/20/2020.  Afebrile. Less abdominal pain. Less diarrhea. He feels his abdomen is still distended and does not allow him to take a deep breath. KUB  Is read " as below, I feel that transvere colon is a little bigger today. Creatinine has worsened. Bicarb is 9. Nephrologist is giving bicarb drip.  Las Vancomycin iv and cefepime were  given on 18   Tolerating vancomycin by mouth and metronidazole    10/21: interim reviewed d/w Dr. Jiménez. Requiring bicarb drip. WBC 14 after neupogen, platelets better. Cr stable. Severe hypoalbuminemia. KUB not ominous, CXR with blunted left CPA. Wife reports he is sleeping all of the time and not eating. I aroused him and he agreed to eat some pudding  10/22: interim reviewed. Renal function slightly worse, bicarb corrected to 18 on drip. Urine output is poor and incontinent, midodrine started. Not eating.   10/23:  Interim reviewed, afebrile, oxygenating normally, off norepinephrine since administration of my do drain.  Stools becoming more solid, 2 measured since 0 700, still having incontinence.  Able to do some physical therapy exercises but physical therapy recommends LTAC/rehab/SNF.  Urine output is good, creatinine has stabilized 2.8, white blood cells normal.  CO2 still low at 15.  No new imaging. Slept during visit, discussed with RN and wife.   10/24:  Afebrile, oxygenating well, sleeping and not eating, urine output is very good, creatinine improved, stools with improving form  10/25: low grade temp this afternoon. No stools recorded yet but nursing reports 10 small stools. Patient reports urinary incontinence but waiting until urge before trying to void. Offered condom catheter.. Creatinine a little better. No appetite. Very irritable about being pushed to eat.  10/26: afebrile. Continues to have incontinent loose stools. Declining much physical therapy. Referral sent to LTAC. Cr better. Questran was discontinued, not sure why. He is asleep and I did not disturb, but spoke with his brother.   10/27:alert and interactive this evening. After discussion, he intends to try to eat(he fears cramping) and get up to a chair tomorrow. D/w  patient and wife about LTAC. Discouraged going home with TPN.   10/29: no fever, sats good, 550 cc of stool recorded for yesterday, white blood cells normal, hemoglobin a little lower, creatinine is 2, urinalysis had 50 white cells, urine culture pending but this was obtained from a urinal and not as a clean-catch specimen and will be canceled., blood culture negative so far.  He ate less than 50% of his breakfast and has not been up to the chair as of 11:00 a.m..  He is agreeable to change anti spasmodic.  P.r.n.  10/30: afebrile. Grossly bloody stools in flexiseal. hgb did drop from 9.2 to 8.2. EGD and FFS tomorrow. first urine culture was taken from urinal. Second one obtained by proper method(after d/w RN). Both have pseudomonas. hehas no tenderness over transplant kidney and no dysuria. Blood culture taken from port last pm has GPC. Changing jimenez tonight and repeat culture. He states he is eating more but this is not accurate.  10/31: called to endoscopy lab to see wall to wall pseudomembranes, active generalized colonic bleeding. Patient required blood transfusion and volume expansion prior to the procedure. He will be moved to ICU after procedure. Discussed with Dr. Edwards and patient's wife and Dr. Decker that he should have a colectomy.   11/2: pt in OR on rounds yesterday. Spoke with wife and checked chart several times. Today acidemia required initiation of HD, trialysis placed, more blood given. Antibiotics simplified to Vanc IV (I think the one blood culture with CoNS from PAC was a contaminant), cefepime (for pseudomonas UTI) and IV flagyl. Also on mycamine pending esophageal biopsies. Stopped oral vanc, vanc enemas, dificid and eravacycline last night.   11/3: interim reviewed, including Dr. Guerrero response to my question. Off pressors, on high dose solucortef. WBC down to 13, platelets 103. Wife reports that he was on prednisone daily at home(not on his home med list)    Antibiotics (From admission,  onward)    Start     Stop Route Frequency Ordered    11/04/20 0901  cefepime in dextrose 5 % 1 gram/50 mL IVPB 1 g      -- IV Every 24 hours (non-standard times) 11/03/20 1708    10/31/20 1900  metronidazole IVPB 500 mg  (C. difficile Infection (CDI) Treatment Order Panel)      -- IV Every 8 hours (non-standard times) 10/31/20 1751    10/30/20 2035  vancomycin - pharmacy to dose  (vancomycin IVPB)      -- IV pharmacy to manage frequency 10/30/20 1935             EXAM & DIAGNOSTICS REVIEWED:   Vitals:     Temp:  [96.5 °F (35.8 °C)-97.8 °F (36.6 °C)]   Temp: 96.9 °F (36.1 °C) (11/03/20 1130)  Pulse: 70 (11/03/20 1645)  Resp: 12 (11/03/20 1645)  BP: (!) 153/79 (11/03/20 0400)  SpO2: (!) 93 % (11/03/20 1645)    Intake/Output Summary (Last 24 hours) at 11/3/2020 1710  Last data filed at 11/3/2020 1602  Gross per 24 hour   Intake 6357.88 ml   Output 1076 ml   Net 5281.88 ml        General:  Alert, appropriate, flushed, generally edematous  Eyes:   anicteric, EOMI  ENT:   no oral lesions  Neck:  Supple   Lungs: Minimal Bibasilar crackles  Heart:  RRR, no gallop/murmur/rub noted  Abd:  Soft,    BS positive, ileostomy RLQ with serous fluid, no guarding  :  Barker, poor urine output  no flank tenderness  Musc:  Joints without effusion, swelling, erythema, synovitis,    Skin:  No rashes.    Wound: Bandages not disturbed  Neuro: Alert, speech fluent, non focal  Psych:   calm cooperative appropriate   Extrem: generalized edema, no erythema, phlebitis, cellulitis, warm and well perfused  VAD:  portacath without redness, drainage, left IJ TLC    Isolation:  none         General Labs reviewed:  Recent Labs   Lab 11/02/20  0901 11/02/20  2138 11/03/20  0420   WBC 22.70* 16.98* 13.06*  13.06*   HGB 7.2* 6.7* 6.3*   HCT 19.9* 19.4* 18.0*    124* 103*       Recent Labs   Lab 11/02/20  0522 11/02/20 2138 11/03/20 0420   * 136 138   K 5.1 3.5 3.6   * 110 109   CO2 <5* 14* 16*   BUN 38* 34* 36*   CREATININE 2.4*  2.3* 2.5*   CALCIUM 6.7* 6.7* 7.2*   PROT 4.3*  --   --    BILITOT 0.8  --   --    ALKPHOS 41*  --   --    ALT 14  --   --    AST 47*  --   --      Micro:  Microbiology Results (last 7 days)     Procedure Component Value Units Date/Time    Stool culture [[id number]] Collected: 10/31/20 0050    Order Status: Completed Specimen: Stool Updated: 11/03/20 1438     Stool Culture No Salmonella,Shigella,Vibrio,Campylobacter,Yersinia isolated.    Stool culture [[id number]] Collected: 10/31/20 1715    Order Status: Completed Specimen: Stool Updated: 11/03/20 1410     Stool Culture Nothing significant to date    Narrative:      [id number]    Blood culture [[id number]] Collected: 10/30/20 2022    Order Status: Completed Specimen: Blood Updated: 11/03/20 0613     Blood Culture, Routine No Growth to date      No Growth to date      No Growth to date      No Growth to date    Narrative:      Change Woods needle and then draw blood culture from the  [facility]    E. coli 0157 antigen [[id number]] Collected: 10/31/20 0050    Order Status: Completed Specimen: Stool Updated: 11/02/20 1123     Shiga Toxin 1 E.coli Negative     Shiga Toxin 2 E.coli Negative    E. coli 0157 antigen [[id number]] Collected: 10/31/20 1715    Order Status: Completed Specimen: Stool Updated: 11/02/20 1108     Shiga Toxin 1 E.coli Negative     Shiga Toxin 2 E.coli Negative    Narrative:      [id number]    C Diff Toxin by PCR [[id number]] Collected: 10/31/20 1715    Order Status: Completed Updated: 11/01/20 1440     C. diff PCR Negative    Narrative:      [id number]    Urine Culture High Risk [[id number]]  (Abnormal)  (Susceptibility) Collected: 10/29/20 1744    Order Status: Completed Specimen: Urine, Clean Catch Updated: 11/01/20 1350     Urine Culture, Routine PSEUDOMONAS AERUGINOSA  >100,000 cfu/ml      Narrative:      Indicated criteria for high risk culture:->Other  Other (specify):->transplant pt    Blood culture [[id number]]  (Abnormal)  (Susceptibility) Collected: 10/28/20  1728    Order Status: Completed Specimen: Blood Updated: 11/01/20 1050     Blood Culture, Routine Gram stain aer bottle: Gram positive cocci in clusters resembling Staph       Results called to and read back by: Fifi Li, Charge Nurse       10/30/2020  19:25      STAPHYLOCOCCUS EPIDERMIDIS    Narrative:      DRAW FROM [facility]    Clostridium difficile EIA [[id number]]  (Abnormal) Collected: 10/31/20 1715    Order Status: Completed Specimen: Stool Updated: 11/01/20 0133     C. diff Antigen Positive     C difficile Toxins A+B, EIA Negative     Comment: Testing not recommended for children <24 months old.       Narrative:      [id number]    Urine culture [[id number]]  (Abnormal)  (Susceptibility) Collected: 10/28/20 1831    Order Status: Completed Specimen: Urine Updated: 10/31/20 0937     Urine Culture, Routine PSEUDOMONAS AERUGINOSA  > 100,000 cfu/ml      Narrative:      Specimen Source->Urine        Imaging Reviewed:   KUBThree round radiodensities overlie the left upper quadrant may represent ingested pills or be in the patient's clothing.  A radiodense thread is noted over the right lower quadrant of uncertain significance.   CXR clear   CT abdomen and pelvis 10/16 :    Findings consistent with severe proctocolitis.    Transplanted kidney right side of the pelvis with mild pelvocaliectasis and perinephric stranding nonspecific.  No calcified stones seen Additional findings as detailed above including cystic lesion with in the native right kidney versus dilatation of collecting structure of the native right kidney.  Stones in the native right kidney.  Cystic lesion within the pancreas  Cardiology:    IMPRESSION & PLAN   1. Severe C. Difficile colitis, improved from ICU status of septic shock, but still having diarrhea despite aggressive treatment     Prompted by oral antibiotics and/or chemotherapy   History of Cdiff 8/2019    REFRACTORY SEVERE PSEUDOMEMBRANOUS COLITIS WITH GENERALIZED COLONIC BLEEDING. S/p  colectomy 11/1    2. PTLD, EBV related lymphoma, receiving chemotherapy      neutropenia, resolved  3. S/p renal transplant on immunosuppression, [facility], refractory acidemia requiring initiation of dialysis 11/2  4. Chronic diarrhea  5. History of recurrent UTIs  6. Anorexia  7. Pseudomonas UTI 10/29(1st cx from urinal , second culture is CCMS)  8. GI bleeding 10/30  9. Blood culture with GPC 10/29, significance unknown    Recommendations:  Continue vanc, cefepime and flagyl, mycamine  Recommend reducing steroids  Will check uric acid. He has been receiving allopurinol. Hopefully will protect from tumor lysis syndrome    Home med list needs to be adjusted to include prednisone. Wife will clarify 5 mg or 10 mg daily

## 2020-11-03 NOTE — NURSING
HD tx complete, tolerated well  2 units PRBC administered on HD  Net UF 500mL  Pt denies complaints

## 2020-11-03 NOTE — PROGRESS NOTES
Pt resting comfortably  Appears weak and lethargic.  Denies pain.  Undergoing dialysis with plans to remove  500 cc's today.   He has been off of vasopressors since MN.    Starting to have some UOP    Wt Readings from Last 3 Encounters:   10/29/20 80.1 kg (176 lb 9.4 oz)   10/10/20 68.5 kg (151 lb)   10/09/20 66.4 kg (146 lb 6.2 oz)     Temp Readings from Last 3 Encounters:   11/03/20 96.9 °F (36.1 °C) (Axillary)   10/10/20 98.3 °F (36.8 °C) (Oral)   10/09/20 98.6 °F (37 °C)     BP Readings from Last 3 Encounters:   11/03/20 (!) 153/79   10/10/20 102/63   10/09/20 115/76     Pulse Readings from Last 3 Encounters:   11/03/20 74   10/10/20 95   10/09/20 (!) 56     Awake  Coarse BS  Sinus  Soft/nd/nt  2+ pulses B    Lab Results   Component Value Date    WBC 13.06 (H) 11/03/2020    WBC 13.06 (H) 11/03/2020    HGB 6.3 (L) 11/03/2020    HCT 18.0 (LL) 11/03/2020    MCV 85 11/03/2020     (L) 11/03/2020         BMP  Lab Results   Component Value Date     11/03/2020    K 3.6 11/03/2020     11/03/2020    CO2 16 (L) 11/03/2020    BUN 36 (H) 11/03/2020    CREATININE 2.5 (H) 11/03/2020    CALCIUM 7.2 (L) 11/03/2020    ANIONGAP 13 11/03/2020    ESTGFRAFRICA 29 (A) 11/03/2020    EGFRNONAA 25 (A) 11/03/2020     A/P: s/p TAC end ileostomy  Pt appears improved today.  H/H remain low.  He is getting 2u PRBC today.  Clinically does not appear to have active bleeding given significant decrease in RIO drainage, no longer requiring pressor support, and stable BP and HR.  In light of this will continue close observation.  IF his H/h were to remain low and continue to require transfusion requirement into tomorrow may need to evaluate source of bleeding with ct scan.    Overall pt extremely sick with apparent poor long term prognosis.  This has been communicated multiple times to his wife.     Regarding rectum. A proctectomy was not performed.  HE was not stable to tolerate at time of surgery on SUnday. He probably has 15 cm  of rectum left.  I feel that enemas would be able to treat this area.  I do not know if it is required.  He has had sheri blood from his rectum tube but appears old and much less than previous.

## 2020-11-03 NOTE — PROGRESS NOTES
[facility]  Patient Name: Alin Burkett  : 1950  Age: 70 y.o.  Sex: male  MRN: [id number]  Admission Date: 10/15/2020  Hospital Length of Stay: 19 days  Code Status: Full Code   Admitting Provider: Brennan Decker MD  Attending Provider: Brennan Decker MD  Primary Care Physician: Carmen Krueger MD  Full Code  Subjective:     Date of Visit: 2020             Principal Problem: Lower GI bleeding    Patient ID: Alin Burkett is a 70 y.o. 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 ED workup showed pt was neutropenic with WBC 0.2 with fever. Pt was cultured and given fluid resuscitation and vancomycin and cefepime. Stool cultures positive for C. Difficile toxin. CT abd/pelvis without contrast revealed severe proctocolitis without megacolon. Pt remains pancytopenic with most recent WBC 0.11 with ANC 0.0 and H/H 9.6/29.7 and platelets 91. Pt seen at bedside with his wife and states he has chronic throbbing abdominal pain made worse by eating and has had chronic diarrhea. He states he is fatigued and has decreased appetite along with fever/chills and night sweats.    10/19/2020: Pt seen at bedside and appears cachectic. He endorses that he is still having diarrhea, but that it has improved and decreased in frequency and consistency has thickened. Pt states abdominal pain has improved, but it still present. He endorses fatigue. Pt denies hemoptysis, hematochezia, melena, hematuria.    10/20/2020: Pt seen at bedside in ICU. He states that he is feeling less lethargic. He states his diarrhea is improving and that he only has abdominal pain to palpation.    10/21/2020: Pt seen at bedside in ICU NAD. He states his abdominal pain has decreased. He is still having diarrhea but  with less frequency.     10/22/2020: Pt seen at bedside in ICU. Pt states his abdominal pain has decreased and it no longer hurt to palpate his abdomen. Pt states diarrhea is improving.     10/23/2020: Pt seen at bedside in ICU. Pt states he no longer has abdominal pain and that his fatigue is improving and that his stool is thickening in consistency.    10/26/2020: Pt seen at bedside. He states abdominal pain has been intermittent and that he has had worsening of diarrhea over the last 3 days. No other complaints at this time.     10/28/2020: Pt seen at bedside NAD. He states his abdominal pain has decreased. No new complaints at this time.     10/29/2020: Pt seen at bedside NAD. Fatigue baseline. Abdominal pain is improving. Pt states that he is still having diarrhea. Pt has had blood-tinged stool.    10/30/2020: Pt seen at bedside with wife. He states he has had an increase in fatigue and abdominal pain and has had bloody diarrhea. Pt's wife complains that patient still has sutures in place wounds on bilateral forearms.     11/01/2020: Pt seen by hem/onc Beto Lindo. Please see his note.    11/02/2020: Pt seen in ICU. Pt had noted c. Diff pseudomembrane colitis and had exploratory lap, with total colectomy, cholecystectomy, and right ileostomy 11/01/2020. Pt was given 2 units PRBC and 1 unit of platelets and 2 units of FFP prior to surgery. Pt had significant blood loss (900cc) during surgery and received 2 additional units of PRBC overnight. Pt seen at bedside with tachypnea. Pt states he had abdominal pain throughout s/p surgery.     11/03/2020: Pt seen in ICU s/p exploratory lap, with total colectomy, cholecystectomy, and right ileostomy 11/01/2020. Pt had CBC with H/H of 6.3/18.0 and underwent dialysis today with 2 units of PRBC. Pt endorses ongoing fatigue and abdominal pain.     Review of Systems   Constitutional: Positive for activity change and fatigue. Negative for chills and fever.   HENT: Negative for  mouth sores and trouble swallowing.    Eyes: Negative for photophobia and visual disturbance.   Respiratory: Negative for cough, chest tightness, shortness of breath, wheezing and stridor.    Cardiovascular: Negative for chest pain and leg swelling.   Gastrointestinal: Positive for abdominal distention, abdominal pain and diarrhea (bloody). Negative for constipation, nausea and vomiting.   Musculoskeletal: Negative for arthralgias, back pain and myalgias.   Skin: Negative for color change, pallor, rash and wound.   Neurological: Negative for syncope, speech difficulty and weakness.   Hematological: Negative for adenopathy. Does not bruise/bleed easily.   Psychiatric/Behavioral: Negative for agitation, behavioral problems, confusion, decreased concentration and dysphoric mood.        ONCOLOGY HISTORY:     1. Monomorphic post-transplant lymphoproliferative disorder              A. 2/2020: Noticed left inguinal lymphadenopathy after a dog bite (failed to resolve with antibiotics)              B. 6/2/2020: Saw Dr. Collins in infectious diseases for inguinal lymphadenopathy - referred for biopsy              C. 6/30/2020: Core biopsy of L inguinal lymph node shows B-cell lymphoma of germinal center origin; EBV-positive by LONNIE; morphology nondiagnostic of PTLD vs follicular lymphoma              D. 7/8/2020: PET/CT shows left internal iliac chain node measuring 3.3 x 3 cm with SUV max 37; L inguinal node measures 3.2 x 2.4 cm with SUV max 36              E. 7/13/2020: Excisional biopsy of left inguinal node shows monomorphic post-transplant lymphoproliferative disorder (DLBCL, GCB 60%, follicular lymphoma, grade 3B 40%); FISH for MYC rearrangement is negative              F. 7/20/2020: Bone marrow biopsy shows no evidence of B-cell lymphoma                Past Medical History:   Diagnosis Date    Acidosis     Adrenal adenoma     Anemia associated with chronic renal failure     Arrhythmia, onset 1995 5/1/2015     Awaiting organ transplant status 2013    Basal cell carcinoma 2012    left nasal tip    Blood type B+ 2013    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  Renal allograft biopsy 17 (DIVINE): 21 glomeruli, none globally sclerosed, <5% interstitial fibrosis, no ACR, c4d negative, AVR CCT Type 2 (V1 lesion); plan THYMO     Dissecting aortic aneurysm (any part), abdominal     Diverticulosis     Encounter for blood transfusion     ESRD (end stage renal disease) 2010    Febrile neutropenia 10/15/2020    H/O urethral stricture 2018    H/O: urethral stricture     History of AAA (abdominal aortic aneurysm) repair     History of urethral stricture 2018    Hypertension     Hypokalemia     Hypothyroidism 1/10/2014    Inguinal hernia bilateral, non-recurrent     Kidney stones     Organ transplant candidate 2013    Plantar warts 1/10/2014    Recurrent UTI 2017    S/P kidney transplant     Secondary hyperparathyroidism, renal     Sepsis 10/24/2020    Thyroid disease        Family History   Problem Relation Age of Onset    Diabetes Mother     Alzheimer's disease Mother     Alcohol abuse Father     HIV Brother     Stroke Maternal Aunt     Kidney disease Paternal Uncle     Kidney disease Cousin     No Known Problems Sister     No Known Problems Daughter     No Known Problems Sister     No Known Problems Brother     No Known Problems Brother     Cancer Brother         thyroid cancer    Colon cancer Brother     Melanoma Neg Hx     Psoriasis Neg Hx     Lupus Neg Hx     Eczema Neg Hx     Colon polyps Neg Hx     Crohn's disease Neg Hx     Ulcerative colitis Neg Hx     Celiac disease Neg Hx         Past Surgical History:   Procedure Laterality Date    ABDOMINAL SURGERY      exploratory lapatomy x 2    ABLATION N/A 8/22/2019    Procedure: ABLATION, SVT;  Surgeon: Emerson Mcmanus MD;  Location: [facility] EP LAB;  Service: Cardiology;  Laterality: N/A;  SVT, RFA, CARTO, anes, GP, 323    AORTA - SUPERIOR MESENTERIC ARTERY BYPASS GRAFT      BLADDER NECK RECONSTRUCTION      BLADDER SURGERY      CHOLECYSTECTOMY N/A 11/1/2020    Procedure: CHOLECYSTECTOMY;  Surgeon: Roger Guerrero MD;  Location: [facility] OR;  Service: General;  Laterality: N/A;    COLONOSCOPY  10/10/2013    Dr. Gutierrez, repeat in 5 years    COLONOSCOPY N/A 10/31/2020    Procedure: COLONOSCOPY;  Surgeon: Bharath Edwards Jr., MD;  Location: [facility];  Service: Endoscopy;  Laterality: N/A;    CYSTOSCOPY      CYSTOSCOPY N/A 12/19/2018    Procedure: CYSTOSCOPY;  Surgeon: Dewey Mann MD;  Location: [facility] OR 1ST FLR;  Service: Urology;  Laterality: N/A;  45 min    CYSTOURETHROSCOPY WITH DIRECT VISION INTERNAL URETHROTOMY N/A 12/19/2018    Procedure: CYSTOSCOPY, WITH DIRECT VISION INTERNAL URETHROTOMY;  Surgeon: Dewey Mann MD;  Location: [facility] OR 1ST FLR;  Service: Urology;  Laterality: N/A;    DILATION OF URETHRA N/A 12/19/2018    Procedure: DILATION, URETHRA;  Surgeon: Dewey Mann MD;  Location: [facility] OR 1ST FLR;  Service: Urology;  Laterality: N/A;    ESOPHAGOGASTRODUODENOSCOPY N/A 10/31/2020    Procedure: ESOPHAGOGASTRODUODENOSCOPY (EGD);  Surgeon: Bharath Edwards Jr., MD;  Location: [facility] ENDO;  Service: Endoscopy;  Laterality: N/A;    EXCISIONAL BIOPSY N/A 7/13/2020    Procedure: EXCISIONAL BIOPSY- LEFT INGUINAL NODE;  Surgeon: Vlad Epstein MD;  Location: [facility] OR 2ND FLR;  Service: General;  Laterality: N/A;    FLEXIBLE CYSTOSCOPY N/A 11/6/2019    Procedure: CYSTOSCOPY, FLEXIBLE;  Surgeon: Dewey Mann MD;  Location: [facility] OR 2ND FLR;  Service: Urology;  Laterality: N/A;    GASTROJEJUNOSTOMY  ~1997    HEMORRHOID  SURGERY      HERNIA REPAIR      ILEOSTOMY Right 11/1/2020    Procedure: CREATION, ILEOSTOMY;  Surgeon: Roger Guerrero MD;  Location: [facility] OR;  Service: General;  Laterality: Right;    INSERTION OF VENOUS ACCESS PORT Left 7/27/2020    Procedure: INSERTION, VENOUS ACCESS PORT;  Surgeon: Vlad Epstein MD;  Location: [address];  Service: General;  Laterality: Left;    KIDNEY TRANSPLANT      LEFT HEART CATHETERIZATION Left 8/20/2019    Procedure: Left heart cath;  Surgeon: Javan Oscar MD;  Location: [facility] CATH/EP LAB;  Service: Cardiology;  Laterality: Left;    LITHOTRIPSY      LYMPH NODE BIOPSY N/A 6/30/2020    Procedure: BIOPSY, LYMPH NODE;  Surgeon: [facility] Diagnostic Provider;  Location: [address];  Service: General;  Laterality: N/A;  189 lymph node biopsy /ULTRASOUND    PERCUTANEOUS NEPHROLITHOTRIPSY      right  ESWL  10/31/12    right ESWL  6/26/12    TOTAL ABDOMINAL COLECTOMY N/A 11/1/2020    Procedure: COLECTOMY, TOTAL, ABDOMINAL;  Surgeon: Roger Guerrero MD;  Location: [facility] OR;  Service: General;  Laterality: N/A;    URETHROPLASTY USING PATCH GRAFT N/A 11/6/2019    Procedure: URETHROPLASTY, USING PATCH GRAFT BUCCAL MUCOSA GRAFT;  Surgeon: Dewey Mann MD;  Location: [address];  Service: Urology;  Laterality: N/A;  3 HOURS       Social History     Socioeconomic History    Marital status: Single     Spouse name: Not on file    Number of children: Not on file    Years of education: Not on file    Highest education level: Not on file   Occupational History     Employer: Disabled   Social Needs    Financial resource strain: Not on file    Food insecurity     Worry: Not on file     Inability: Not on file    Transportation needs     Medical: Not on file     Non-medical: Not on file   Tobacco Use    Smoking status: Former Smoker     Packs/day: 0.50     Years: 40.00     Pack years: 20.00     Types: Cigarettes     Quit date: 6/16/2010     Years since quitting: 10.3     Smokeless tobacco: Never Used   Substance and Sexual Activity    Alcohol use: Yes     Alcohol/week: 3.0 standard drinks     Types: 3 Cans of beer per week     Comment: occasional/social    Drug use: No     Comment: THC in youth    Sexual activity: Yes     Partners: Female     Birth control/protection: None   Lifestyle    Physical activity     Days per week: Not on file     Minutes per session: Not on file    Stress: Not on file   Relationships    Social connections     Talks on phone: Not on file     Gets together: Not on file     Attends Yarsanism service: Not on file     Active member of club or organization: Not on file     Attends meetings of clubs or organizations: Not on file     Relationship status: Not on file   Other Topics Concern    Not on file   Social History Narrative    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       Current Facility-Administered Medications   Medication Dose Route Frequency Provider Last Rate Last Dose    0.9%  NaCl infusion (for blood administration)   Intravenous Q24H PRN Roger Guerrero MD        0.9%  NaCl infusion (for blood administration)   Intravenous Q24H PRN Roger Guerrero MD        0.9%  NaCl infusion (for blood administration)   Intravenous Q24H PRN Roberto Carlos Conn MD        0.9%  NaCl infusion   Intravenous Continuous Roger Guerrero MD 10 mL/hr at 10/31/20 1538      albumin human 25% bottle 25 g  25 g Intravenous Once Roberto Carlos Conn MD   Stopped at 11/03/20 0945    albuterol-ipratropium 2.5 mg-0.5 mg/3 mL nebulizer solution 3 mL  3 mL Nebulization Q6H PRN Roger Guerrero MD   3 mL at 10/20/20 1602    allopurinoL tablet 100 mg  100 mg Oral Daily Roger Guerrero MD   100 mg at 11/02/20 0855    brimonidine-timoloL 0.2-0.5 % ophthalmic solution 1 drop  1 drop Both Eyes Q12H Roger Guerrero MD   1 drop at 11/03/20 1009    calcitRIOL capsule 0.5 mcg  0.5 mcg Oral Daily Roger Guerrero MD   0.5 mcg at 11/02/20 0854    calcium gluc in  NaCl, iso-osm 1 gram/50 mL Soln 1,000 mg  1 g Intravenous Once Roger Guerrero MD        calcium gluconate 1g in dextrose 5% 100mL (ready to mix system)  1 g Intravenous PRN Roger Guerrero MD   1 g at 11/01/20 1334    calcium gluconate 2 g in dextrose 5 % 100 mL IVPB  2 g Intravenous PRN Roger Guerrero MD        calcium gluconate 3 g in dextrose 5 % 100 mL IVPB  3 g Intravenous PRN Roger Guerrero MD        cefepime in dextrose 5 % 1 gram/50 mL IVPB 1 g  1 g Intravenous Q12H Roger Guerrero  mL/hr at 11/03/20 0901 1 g at 11/03/20 0901    cholestyramine-aspartame 4 gram packet 4 g  1 packet Oral TID Roger Guerrero MD   4 g at 10/31/20 2012    dicyclomine capsule 10 mg  10 mg Oral QID PRN Roger Guerrero MD        ferrous sulfate EC tablet 325 mg  325 mg Oral BID Roger Guerrero MD   325 mg at 11/02/20 0901    fidaxomicin tablet 200 mg  200 mg Oral BID Roger Guerrero MD   200 mg at 11/02/20 0856    heparin (porcine) injection 3,000 Units  3,000 Units Intravenous PRN Brennan Decker MD   3,000 Units at 11/03/20 1218    hydrocortisone sod succ (PF) injection 500 mg  500 mg Intravenous Q6H Brennan Decker MD   500 mg at 11/03/20 1230    hyoscyamine ODT 0.125 mg  0.125 mg Sublingual Q4H PRN Roger Guerrero MD   0.125 mg at 11/01/20 0816    levothyroxine injection 50 mcg  50 mcg Intravenous Daily Brennan Decker MD   50 mcg at 11/03/20 0917    magnesium sulfate 2g in water 50mL IVPB (premix)  2 g Intravenous PRN Roger Guerrero MD        magnesium sulfate 2g in water 50mL IVPB (premix)  4 g Intravenous PRN Roger Guerrero MD        metoclopramide HCl injection 10 mg  10 mg Intravenous Q6H PRN Roger Guerrero MD        metronidazole IVPB 500 mg  500 mg Intravenous Q8H Roger Guerrero MD   Stopped at 11/03/20 1100    micafungin 100 mg in sodium chloride 0.9 % 100 mL IVPB (ready to mix system)  100 mg Intravenous Q24H Roger Guerrero  mL/hr at 11/02/20 1720 100 mg at  11/02/20 1720    midodrine tablet 10 mg  10 mg Oral TID Roger Guerrero MD   10 mg at 11/02/20 1540    morphine injection 2 mg  2 mg Intravenous Q6H PRN Roger Guerrero MD   2 mg at 11/03/20 1428    norepinephrine 16 mg in dextrose 5 % 250 mL infusion  0.02 mcg/kg/min Intravenous Continuous Brennan Decker MD   Stopped at 11/03/20 0043    phenylephrine (GATITO-SYNEPHRINE) 100 mg in sodium chloride 0.9% 250 mL infusion  0.5 mcg/kg/min Intravenous Continuous Brennan Decker MD   Stopped at 11/02/20 2300    potassium chloride 10 mEq in 100 mL IVPB  40 mEq Intravenous PRN Roger Guerrero  mL/hr at 10/20/20 0729 40 mEq at 10/20/20 0729    And    potassium chloride 10 mEq in 100 mL IVPB  60 mEq Intravenous PRN Roger Guerrero  mL/hr at 11/01/20 0628 60 mEq at 11/01/20 0628    And    potassium chloride 10 mEq in 100 mL IVPB  80 mEq Intravenous PRN Roger Guerrero MD        potassium chloride SA CR tablet 20 mEq  20 mEq Oral BID Roberto Carlos Conn MD        promethazine tablet 25 mg  25 mg Oral Q6H PRN Roger Guerrero MD        sodium bicarbonate 150 mEq in dextrose 5 % 1,000 mL infusion   Intravenous Continuous Roberto Carlos Conn  mL/hr at 11/03/20 0941      sodium bicarbonate tablet 650 mg  650 mg Oral TID Roger Guerrero MD   650 mg at 11/02/20 1541    sodium chloride 0.9% flush 10 mL  10 mL Intravenous PRN Roger Guerrero MD        sodium chloride 0.9% flush 10 mL  10 mL Intravenous PRN Gilmer Adler MD        sodium chloride 0.9% flush 10 mL  10 mL Intravenous PRN Gilmer Adler MD        sucralfate 100 mg/mL suspension 1 g  1 g Oral QID (AC & HS) Roger Guerrero MD   Stopped at 11/03/20 0645    tacrolimus capsule 2 mg  2 mg Oral Daily PM Roger Guerrero MD   2 mg at 11/02/20 1720    tacrolimus capsule 3 mg  3 mg Oral Daily AM Roger Guerrero MD   3 mg at 11/02/20 0852    travoprost 0.004 % ophthalmic solution 1 drop  1 drop Both Eyes QHS Roger Guerrero MD   1 drop  at 11/02/20 2100    vancomycin - pharmacy to dose   Intravenous pharmacy to manage frequency Roger Guerrero MD            albumin human 25%  25 g Intravenous Once    allopurinoL  100 mg Oral Daily    brimonidine-timoloL  1 drop Both Eyes Q12H    calcitRIOL  0.5 mcg Oral Daily    calcium gluc in NaCl, iso-osm  1 g Intravenous Once    ceFEPime (MAXIPIME) IVPB  1 g Intravenous Q12H    cholestyramine-aspartame  1 packet Oral TID    ferrous sulfate  325 mg Oral BID    fidaxomicin  200 mg Oral BID    hydrocortisone sod succ (PF)  500 mg Intravenous Q6H    levothyroxine  50 mcg Intravenous Daily    metronidazole  500 mg Intravenous Q8H    micafungin (MYCAMINE) IVPB  100 mg Intravenous Q24H    midodrine  10 mg Oral TID    potassium chloride  20 mEq Oral BID    sodium bicarbonate  650 mg Oral TID    sucralfate  1 g Oral QID (AC & HS)    tacrolimus  2 mg Oral Daily PM    tacrolimus  3 mg Oral Daily AM    travoprost  1 drop Both Eyes QHS        sodium chloride 0.9% 10 mL/hr at 10/31/20 1538    norepinephrine bitartrate-D5W Stopped (11/03/20 0043)    phenylephrine Stopped (11/02/20 2300)    sodium bicarbonate drip 125 mL/hr at 11/03/20 0941       sodium chloride, sodium chloride, sodium chloride, albuterol-ipratropium, calcium gluconate IVPB, calcium gluconate IVPB, calcium gluconate IVPB, dicyclomine, heparin (porcine), hyoscyamine, magnesium sulfate IVPB, magnesium sulfate IVPB, metoclopramide HCl, morphine, potassium chloride in water **AND** potassium chloride in water **AND** potassium chloride in water, promethazine, sodium chloride 0.9%, sodium chloride 0.9%, sodium chloride 0.9%, Pharmacy to dose Vancomycin consult **AND** vancomycin - pharmacy to dose    Antibiotics (From admission, onward)    Start     Stop Route Frequency Ordered    10/31/20 1900  metronidazole IVPB 500 mg  (C. difficile Infection (CDI) Treatment Order Panel)      -- IV Every 8 hours (non-standard times) 10/31/20 6258     10/30/20 2100  fidaxomicin tablet 200 mg      11/09 2059 Oral 2 times daily 10/30/20 1903    10/30/20 2045  cefepime in dextrose 5 % 1 gram/50 mL IVPB 1 g      -- IV Every 12 hours (non-standard times) 10/30/20 1934    10/30/20 2035  vancomycin - pharmacy to dose  (vancomycin IVPB)      -- IV pharmacy to manage frequency 10/30/20 1935          Review of patient's allergies indicates:  No Known Allergies  All medications and past history have been reviewed.    Objective:      Vitals:  Patient Vitals for the past 24 hrs:   BP Temp Temp src Pulse Resp SpO2   11/03/20 1530 -- -- -- 74 -- --   11/03/20 1515 -- -- -- 75 15 (!) 94 %   11/03/20 1500 -- -- -- 77 (!) 22 (!) 94 %   11/03/20 1445 -- -- -- 78 (!) 23 (!) 94 %   11/03/20 1430 -- -- -- 84 (!) 26 (!) 94 %   11/03/20 1428 -- -- -- -- (!) 29 --   11/03/20 1400 -- -- -- 74 14 95 %   11/03/20 1345 -- -- -- 75 14 (!) 94 %   11/03/20 1330 -- -- -- 72 12 (!) 94 %   11/03/20 1315 -- -- -- 79 16 95 %   11/03/20 1300 -- -- -- 74 16 95 %   11/03/20 1245 -- -- -- 79 (!) 25 95 %   11/03/20 1230 -- -- -- 80 (!) 23 95 %   11/03/20 1215 -- -- -- 74 (!) 25 95 %   11/03/20 1200 -- -- -- 74 (!) 27 95 %   11/03/20 1145 -- -- -- 71 16 96 %   11/03/20 1130 -- 96.9 °F (36.1 °C) Axillary 79 (!) 31 96 %   11/03/20 1115 -- 96.7 °F (35.9 °C) Axillary 78 (!) 29 96 %   11/03/20 1100 -- 96.5 °F (35.8 °C) Axillary 75 (!) 21 95 %   11/03/20 1045 -- 97.2 °F (36.2 °C) Axillary 76 15 95 %   11/03/20 1035 -- 97.1 °F (36.2 °C) Axillary 78 (!) 21 95 %   11/03/20 1030 -- 96.8 °F (36 °C) Axillary 80 (!) 23 (!) 94 %   11/03/20 1015 -- 96.8 °F (36 °C) Axillary 84 (!) 22 95 %   11/03/20 1010 -- -- -- 82 15 (!) 94 %   11/03/20 1005 -- -- -- 80 16 95 %   11/03/20 1000 -- 96.9 °F (36.1 °C) Axillary 77 15 (!) 94 %   11/03/20 0945 -- -- -- 80 16 95 %   11/03/20 0930 -- 96.9 °F (36.1 °C) Axillary 83 17 (!) 94 %   11/03/20 0915 -- -- -- 85 18 (!) 94 %   11/03/20 0900 -- -- -- 88 (!) 25 95 %   11/03/20 0845 -- -- --  83 16 (!) 94 %   11/03/20 0830 -- -- -- 81 16 (!) 94 %   11/03/20 0815 -- 97.5 °F (36.4 °C) Axillary 83 17 95 %   11/03/20 0800 -- -- -- 82 18 95 %   11/03/20 0745 -- -- -- 86 16 95 %   11/03/20 0645 -- -- -- 84 18 (!) 94 %   11/03/20 0630 -- -- -- 87 17 95 %   11/03/20 0615 -- 97.3 °F (36.3 °C) Axillary 88 18 (!) 94 %   11/03/20 0600 -- -- -- 85 14 (!) 94 %   11/03/20 0545 -- -- -- 88 15 (!) 94 %   11/03/20 0530 -- 97.3 °F (36.3 °C) Axillary 86 14 (!) 94 %   11/03/20 0515 -- 97.4 °F (36.3 °C) Axillary 89 19 (!) 94 %   11/03/20 0500 -- -- -- 85 14 (!) 94 %   11/03/20 0445 -- -- -- 88 16 (!) 94 %   11/03/20 0430 -- -- -- 89 (!) 27 (!) 93 %   11/03/20 0415 -- -- -- 94 (!) 26 (!) 93 %   11/03/20 0400 (!) 153/79 97.4 °F (36.3 °C) Axillary 89 19 (!) 93 %   11/03/20 0300 -- -- -- 87 19 (!) 93 %   11/03/20 0245 -- -- -- 87 16 (!) 93 %   11/03/20 0230 -- -- -- 88 (!) 24 (!) 93 %   11/03/20 0215 -- 97.1 °F (36.2 °C) Axillary 87 19 (!) 93 %   11/03/20 0200 -- 97.5 °F (36.4 °C) Axillary 84 19 (!) 93 %   11/03/20 0145 -- -- -- 88 (!) 22 (!) 93 %   11/03/20 0130 -- -- -- 92 (!) 26 (!) 93 %   11/03/20 0115 -- -- -- 94 (!) 33 (!) 92 %   11/03/20 0111 -- -- -- 91 (!) 26 (!) 92 %   11/03/20 0100 -- -- -- 92 (!) 26 (!) 94 %   11/03/20 0041 -- -- -- -- (!) 31 --   11/03/20 0030 -- -- -- 96 (!) 30 (!) 93 %   11/03/20 0015 135/69 97.8 °F (36.6 °C) Axillary 90 (!) 34 (!) 93 %   11/03/20 0000 126/69 -- -- 92 (!) 25 (!) 92 %   11/02/20 2345 125/64 97.4 °F (36.3 °C) Axillary 92 (!) 25 (!) 93 %   11/02/20 2330 115/61 -- -- 92 (!) 25 (!) 94 %   11/02/20 2315 -- 97.5 °F (36.4 °C) Axillary 86 (!) 24 (!) 94 %   11/02/20 2300 -- 97.5 °F (36.4 °C) Axillary 87 (!) 28 (!) 93 %   11/02/20 2245 -- 97.5 °F (36.4 °C) Axillary 84 (!) 22 (!) 94 %   11/02/20 2230 -- -- -- 88 (!) 26 (!) 94 %   11/02/20 2215 -- -- -- 85 19 95 %   11/02/20 2200 -- -- -- 85 19 95 %   11/02/20 2145 -- -- -- 86 (!) 23 95 %   11/02/20 2130 -- -- -- 84 20 95 %   11/02/20 2115 --  -- -- 83 (!) 21 (!) 94 %   11/02/20 2100 -- -- -- 81 19 (!) 94 %   11/02/20 2045 -- -- -- 85 (!) 22 (!) 94 %   11/02/20 2030 -- -- -- 83 (!) 21 (!) 93 %   11/02/20 2015 -- -- -- 88 (!) 24 (!) 94 %   11/02/20 2000 -- -- -- 95 (!) 30 95 %   11/02/20 1915 107/72 97.4 °F (36.3 °C) -- 103 (!) 26 97 %   11/02/20 1900 107/77 -- -- 107 (!) 27 95 %   11/02/20 1833 102/70 97.6 °F (36.4 °C) Axillary 87 (!) 29 100 %   11/02/20 1830 (!) 45/29 -- -- (!) 127 -- --   11/02/20 1815 (!) 80/60 97.6 °F (36.4 °C) Axillary 105 (!) 37 100 %   11/02/20 1800 94/69 97.7 °F (36.5 °C) Axillary (!) 112 (!) 30 96 %   11/02/20 1745 105/72 97.7 °F (36.5 °C) Axillary 102 (!) 30 100 %   11/02/20 1730 (!) 109/57 97.3 °F (36.3 °C) Axillary 110 (!) 28 100 %   11/02/20 1715 93/66 -- -- 103 -- --   11/02/20 1700 118/70 -- -- 101 -- --   11/02/20 1655 114/70 97.3 °F (36.3 °C) Axillary 102 (!) 37 95 %   11/02/20 1630 132/64 -- -- 105 (!) 27 (!) 93 %   11/02/20 1615 (!) 101/58 -- -- 105 (!) 27 (!) 93 %   11/02/20 1600 137/65 -- -- 104 (!) 28 (!) 94 %      Body mass index is 24.63 kg/m².  Body surface area is 2 meters squared.    Last 24 Hours:    Intake/Output Summary (Last 24 hours) at 11/3/2020 1536  Last data filed at 11/3/2020 1433  Gross per 24 hour   Intake 6257.88 ml   Output 1015 ml   Net 5242.88 ml     Weight Readings:  Wt Readings from Last 5 Encounters:   10/29/20 80.1 kg (176 lb 9.4 oz)   10/10/20 68.5 kg (151 lb)   10/09/20 66.4 kg (146 lb 6.2 oz)   10/02/20 65.8 kg (145 lb)   09/11/20 68.6 kg (151 lb 2 oz)      Blood Type:  B POS     Physical Exam  Constitutional:       Appearance: He is ill-appearing.   HENT:      Head: Normocephalic and atraumatic.      Right Ear: External ear normal.      Left Ear: External ear normal.      Nose: Nose normal. No congestion or rhinorrhea.   Eyes:      General:         Right eye: No discharge.         Left eye: No discharge.      Extraocular Movements: Extraocular movements intact.      Conjunctiva/sclera:  Conjunctivae normal.      Pupils: Pupils are equal, round, and reactive to light.   Neck:      Musculoskeletal: Normal range of motion and neck supple. No neck rigidity.   Cardiovascular:      Rate and Rhythm: Normal rate and regular rhythm.      Heart sounds: Normal heart sounds. No murmur.   Pulmonary:      Breath sounds: Rales present.      Comments: tachypnea    Abdominal:      General: Bowel sounds are normal. There is distension.      Palpations: Abdomen is soft.      Tenderness: There is abdominal tenderness (to palpation in the LLQ). There is no guarding.   Musculoskeletal: Normal range of motion.         General: No deformity.      Right lower leg: No edema.      Left lower leg: No edema.   Lymphadenopathy:      Cervical: No cervical adenopathy.   Skin:     General: Skin is warm and dry.      Coloration: Skin is not pale.      Findings: Bruising (bialteral UE and chest) and lesion (wounds on bilateral forearms with sutures in place) present. No erythema or rash.   Neurological:      General: No focal deficit present.      Mental Status: He is alert and oriented to person, place, and time. Mental status is at baseline.      Cranial Nerves: No cranial nerve deficit.   Psychiatric:         Mood and Affect: Mood normal.         Behavior: Behavior normal.         Thought Content: Thought content normal.         Judgment: Judgment normal.         Labs:  Recent Labs   Lab 10/30/20  1733  11/01/20  1121  11/02/20  0901 11/02/20 2138 11/03/20  0420   WBC 3.99   < > 4.00   < > 22.70* 16.98* 13.06*  13.06*   RBC 2.56*   < > 2.78*   < > 2.26* 2.27* 2.13*   HGB 8.2*   < > 8.8*   < > 7.2* 6.7* 6.3*   HCT 25.7*   < > 26.4*   < > 19.9* 19.4* 18.0*      < > 145*   < > 155 124* 103*   *   < > 95   < > 88 86 85   INR 1.1  --  1.2  --   --   --   --     < > = values in this interval not displayed.     Recent Labs   Lab 11/02/20  0522 11/02/20 2138 11/03/20  0420   * 136 138   K 5.1 3.5 3.6   * 110  109   CO2 <5* 14* 16*   BUN 38* 34* 36*   CREATININE 2.4* 2.3* 2.5*   * 108 119*   CALCIUM 6.7* 6.7* 7.2*   PHOS  --   --  4.7*   ALKPHOS 41*  --   --    PROT 4.3*  --   --    ALBUMIN 1.7*  --   --    BILITOT 0.8  --   --    AST 47*  --   --    ALT 14  --   --        Imaging:  Results for orders placed or performed during the hospital encounter of 10/15/20 (from the past 2160 hour(s))   CT Abdomen Pelvis  Without Contrast    Impression    Findings consistent with severe proctocolitis.    Transplanted kidney right side of the pelvis with mild pelvocaliectasis and perinephric stranding nonspecific.  No calcified stones seen    Additional findings as detailed above including cystic lesion with in the native right kidney versus dilatation of collecting structure of the native right kidney.  Stones in the native right kidney.  Cystic lesion within the pancreas.    Final read    Virtual Radiology concordant      Electronically signed by: Ada Castillo MD  Date:    10/16/2020  Time:    08:56   Results for orders placed or performed during the hospital encounter of 08/06/20 (from the past 2160 hour(s))   CT Chest Abdomen Pelvis Without Contrast (XPD)    Impression    No acute abdominal pathology identified.    Nonvisualization of the left kidney with malrotation of the right kidney and multiple nonobstructing renal stones.  No hydronephrosis.    Right pelvic renal allograft with no evidence for renal allograft focal lesion, nephrolithiasis, or hydronephrosis.    Stable mild ectasia of the infrarenal abdominal aorta measuring up to 2.6 cm without evidence for aortic aneurysm or other acute aortic pathology.  Atherosclerosis identified.    Multiple enlarged lymph nodes in the left inguinal region, with interval dissection of multiple left inguinal and pelvic lymph nodes when compared to nuclear medicine PET-CT 07/08/2020.  Correlate with biopsy results.    Stable cystic appearing lesion in the pancreatic head  measuring 1.2 cm.    Other findings as above.    Electronically signed by resident: Mati Cárdenas  Date:    08/06/2020  Time:    09:23    Electronically signed by: Jayson Staples MD  Date:    08/06/2020  Time:    10:34     *Note: Due to a large number of results and/or encounters for the requested time period, some results have not been displayed. A complete set of results can be found in Results Review.     PET CT 07/08/2020  FINDINGS:  Quality of the study: Adequate.     In the neck, there is no abnormal hypermetabolic activity worrisome for malignancy.  Vascular stent identified in the left axillary region.  No significant lymphadenopathy.     In the chest, there is no abnormal hypermetabolic activity worsened malignancy.  Coronary atherosclerosis.  0.4 cm pulmonary nodule identified in the left upper lobe (axial series 3, image 67), lesion is too small to characterize with PET-CT. Recommend attenuation expected follow-up examinations. No significant lymphadenopathy.     In the abdomen and pelvis, there is hypermetabolic lymphadenopathy throughout the left internal/external iliac chain and left groin.  New left internal iliac chain node measures 3.3 x 3 0 cm with SUV max of 37 (axial fused image 193).  Left inguinal node measures approximately 3.2 x 2.4 cm with SUV max of 36 (axial fused image 218), previously measured up to 1.4 cm.  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  The right kidney appears atrophic with abnormal contour parenchymal calcifications, unchanged.  Right lower quadrant transplant kidney in place.  Stable small bladder diverticulum.  Stable 1.2 cm pancreatic cyst, better characterized on most recent CT with IV contrast.  Stable bilateral probable adrenal adenomas.  Colonic diverticulosis without evidence of acute diverticulitis.  Stable fusiform abdominal aortic ectasia.     Spleen appears upper limit of normal for size measuring 12.0 cm in craniocaudal dimension.  Normal heterogeneous uptake  similar to liver.     In the bones, there is no abnormal activity worrisome for malignancy.  Additional focus of increased uptake identified within the myofascial structures of the thigh, just deep to the left femur with SUV max of 27 (axial fused image 269).     Impression:     Hypermetabolic lymphadenopathy throughout the left iliac chain and left groin with additional hypermetabolic focus in the left thigh.  No hypermetabolic juan david disease above the diaphragm.  The Deauville score is 5.    All lab results and imaging results have been reviewed.    Assessment and Plan:      Present on Admission:   (Resolved) Febrile neutropenia   Acquired hypothyroidism   Post-transplant lymphoproliferative disorder (PTLD)   Acute renal failure superimposed on stage 3 chronic kidney disease   Hypokalemia due to excessive gastrointestinal loss of potassium   Moderate malnutrition   Hyperchloremic acidosis   (Resolved) Sepsis    septic shock/DIVINE/electrolyte abnormalities in the setting of C. Diff colitis.  -Nephrology, GI, ID and hospital medicine monitoring.     Leukocytosis  -elevated WBC secondary to infection/inflammation and sepsis. Continue to trend with CBCs.     Anemia  -Anemia secondary to blood loss and chronic disease. Updated iron panel shows improvement of serum iron to 39 with iron saturation WNL. Ferritin elevated secondary to sepsis. Pt had significant blood loss (900cc) from surgery. Pt transfusion dependent at this time so oral iron can be held.   -Pt received 2 units PRBC today with HD. Awaiting updated CBC to show anemia s/p transfusion. Continue to monitor with serial CBCs.  -Transfuse PRBC PRN if pt has H/H <7/21 or if pt symptomatically anemic/hemodynamically unstable.  -Transfuse platelets PRN if platelet count <100,000 or if pt is actively bleeding.  -Continue to monitor anemia and platelet count with serial CBCs.      PTLD lymphoma, s/p renal transplant  -[facility] completed on 10/09/2020.  -Pt  will need to follow up with Dr Gould at [facility].   -Continue monitoring tacrolimus level.  -Will continue to monitor patient.    Sincerely,  Alexi Camp PA-C    Note is available for collaborating MD; Dr. Julienne Alfaro for review.    Electronically signed by: Alexi Camp PA-C

## 2020-11-03 NOTE — NURSING
Received a critical H/H of 6.7/19.4 and after speaking with Nephrology and General Surgery it was recommended to give 2 units of PRBC's slowly. Patient does not appear to be actively bleeding from drains or surgical site. Have paused phenylephrine and he is now on Norepinephrine at .025. Calcium is also critical which will be replaced per protocol. Oxygen requirements are now 5 L N/C. Continuing to monitor oxygenation and bleeding closely.

## 2020-11-03 NOTE — CONSULTS
Consulted for new ileostomy created on 11/1/2020. Patient not available today due to dialysis catheter placement then hemodialysis which followed. Will follow up with this patient on Tuesday.

## 2020-11-03 NOTE — ASSESSMENT & PLAN NOTE
Improving.  Continue monitoring Cr.  Keep MAP > 55.  Nephrologist consultant started him on hemodialysis.  Was attempted today but stopped b/c of hypotension.    Lab Results   Component Value Date    CREATININE 2.3 (H) 11/02/2020

## 2020-11-03 NOTE — PLAN OF CARE
Intervention: general healthful diet, nutrition education, parenteral nutrition therapy, and prescription medication- appetite stimulant      Recommendation:   1) advance PO diet to rrenal as soon as medically able  2) continue supplements to Novasource renal + ice cream shakes BID when on PO diet  3) continue appetite stimulant     4) Advance nutrition support again in < 48 hours   Pt refused NGT, continue PPN short term  D 5 AA 4.25 @ 50 ml/hr + standard lipids-> advance if able to 85 ml/hr  ( 1192 kcal ( 62% EEN), 86 g protein ( 100% EPN))     5) If nutrition support needed longterm discuss PEG with pt  Isosource 1.5 @ 20 ml/hr advancing to goal of 50 ml/hr + 180 ml flush q 4 hr  ( provides 81 g protein ( 100% EEN), 1800 kcal ( 94% EEN), and 912 ml free water)     Goals: 1) PO intakes 50% of meals and supplements at f/u 2) PO intakes > 50% of meals or supplemental nutrition support continues 3) Nutrition support initiated  Nutrition Goal Status: not met/ not met/ new  Communication of BRITTNEY Recs: reviewed with MD (POC, sticky note)

## 2020-11-03 NOTE — ASSESSMENT & PLAN NOTE
Give supplement and monitor level.    Potassium   Date Value Ref Range Status   11/02/2020 3.5 3.5 - 5.1 mmol/L Final   11/02/2020 5.1 3.5 - 5.1 mmol/L Final

## 2020-11-03 NOTE — PROGRESS NOTES
"[facility]  Adult Nutrition  Progress Note    SUMMARY     Intervention: general healthful diet, nutrition education, parenteral nutrition therapy, and prescription medication- appetite stimulant      Recommendation:   1) advance PO diet to rrenal as soon as medically able  2) continue supplements to Novasource renal + ice cream shakes BID when on PO diet  3) continue appetite stimulant     4) Advance nutrition support again in < 48 hours   Pt refused NGT, continue PPN short term  D 5 AA 4.25 @ 50 ml/hr + standard lipids-> advance if able to 85 ml/hr  ( 1192 kcal ( 62% EEN), 86 g protein ( 100% EPN))     5) If nutrition support needed longterm discuss PEG with pt  Isosource 1.5 @ 20 ml/hr advancing to goal of 50 ml/hr + 180 ml flush q 4 hr  ( provides 81 g protein ( 100% EEN), 1800 kcal ( 94% EEN), and 912 ml free water)     Goals: 1) PO intakes 50% of meals and supplements at f/u 2) PO intakes > 50% of meals or supplemental nutrition support continues 3) Nutrition support initiated  Nutrition Goal Status: not met/ not met/ new  Communication of RD Recs: reviewed with MD (POC, sticky note)     Reason for Assessment     Reason For Assessment: follow up  Diagnosis: (septic shock)  Relevant Medical History: PTLD s/p chemo, CKD 3, renal transplant 4 years ago  Interdisciplinary Rounds: attended     General Information Comments: 69 y/o male admitted with sepsis, neutropenia as well as diarrhea/ cramps x 2 weeks as well as fever, poor appetite, and malaise x 1 week. Last chemo, CHOP, end of last week. NFPE done 10/20/20 mild-severe wasting seen. Pt within UBW range. Tells me he has had a, "poor appetite for years," worse this past week. Only ate 2 bites of his oatmeal.  10/21 Pt not eating on regular diet, poor appetite parenteral nutrition initiated as pt declined NGT  10/26  Pt still c/o poor appetite despite appetite stimulant, PO intakes 0-25%. PPN reinitiated per discussion with MD, rec. Discussion " "about longterm nutrition support with pt. Diarrhea improving.  10/28/20 C. Diff ( +). Pt still complains that he has no appetite, denies stomach trouble at this time, pain and stool improving. PPN continues. Pt agreeable to try Boost mixed with ice cream, no taking boost pudding. Appetite stimulant no longer ordered in Epic, discussed reordering with RN.  11/3/20 POD 2 total colectomy/ ileostomy formation. Pt was receiving PPN and make NPO in ICU. Tachy and hypotensive yesterday, on pressors which were weaned. PPN discontinued yesterday r/t volume overload per MD. Pt getting HD today. Per MD will not reorder PPN this afternoon.      Nutrition Discharge Planning: to be determined- Regular diet + boost plus + ice cream TID     Nutrition Risk Screen     Nutrition Risk Screen: no indicators present     Nutrition/Diet History     Spiritual, Cultural Beliefs, Caodaism Practices, Values that Affect Care: no  Food Allergies: NKFA  Factors Affecting Nutritional Intake: decreased appetite, altered GI function, NPO     Anthropometrics     Height Method: Measured(10/2/20)  Height: 5' 11"  Height (inches): 71 in  Weight Method: Bed Scale  Weight: 80.1 kg 10/29,  83.5 10/24, 68.1 kg (admission)  Weight (lb): 176 lb ( edema noted)  Ideal Body Weight (IBW), Male: 172 lb  BMI (Calculated): 20.9 admission  BMI Grade: 18.5-24.9 - normal  Usual Body Weight (UBW), k kg(20)  Weight Change Amount: (65.8 kg 10/2/20 and 10/30/19)     Lab/Procedures/Meds     Pertinent Labs Reviewed: reviewed  BMP  Lab Results   Component Value Date     2020    K 3.6 2020     2020    CO2 16 (L) 2020    BUN 36 (H) 2020    CREATININE 2.5 (H) 2020    CALCIUM 7.2 (L) 2020    ANIONGAP 13 2020    ESTGFRAFRICA 29 (A) 2020    EGFRNONAA 25 (A) 2020     Lab Results   Component Value Date    ALBUMIN 1.7 (L) 2020     Recent Labs   Lab 20  1235   POCTGLUCOSE 122*       Pertinent " Medications Reviewed: reviewed  Albumin, calcitriol, iron, KCl, NS @ 10 ml/hr     Estimated/Assessed Needs   modified  Weight Used For Calorie Calculations: 68.1 kg (estimated dry weight)  Energy Calorie Requirements (kcal): 30-35 kcal/kg ( 7466-3752 kcal)  Energy Need Method: kcal/kg ( HD)  Protein Requirements: 1.2 g protein/kg ( CA, wasting, vs. CKD) = 81 g protein  Weight Used For Protein Calculations: 68.1 kg (150 lb 2.1 oz)  Fluid Requirements (mL): 1500 ml or per MD  Estimated Fluid Requirement Method: RDA Method  CHO Requirement: N/A        Nutrition Prescription Ordered     Current Diet Order: NPO x 2 days     Evaluation of Received Nutrient/Fluid Intake     Energy Calories Required: notmeeting needs  Protein Required: not needs  Fluid Required: exceeding needs when on PPN  Tolerance: NPO  % Intake of Estimated Energy Needs: 0%  % Meal Intake: 0%     Nutrition Risk     Level of Risk/Frequency of Follow-up: moderate 2 x weekly      Assessment and Plan     Moderate malnutrition  Contributing Nutrition Diagnosis  Moderate chronic illness related malnutrition     Related to (etiology):   Increased needs d/t CA and decreased appetite     Signs and Symptoms (as evidenced by):   1) PO intakes < 75% x > 1 month  2) mild-moderate muscle/ fat wasting as charted below     Interventions:  Above     Nutrition Diagnosis Status:   continues        Monitor and Evaluation     Food and Nutrient Intake: energy intake, food and beverage intake  Food and Nutrient Adminstration: diet order, parenteral nutrition therapy  Anthropometric Measurements: weight  Biochemical Data, Medical Tests and Procedures: electrolyte and renal panel, gastrointestinal profile, glucose, GI function  Nutrition-Focused Physical Findings: overall appearance      Malnutrition Assessment  Malnutrition Type: chronic illness  Skin (Micronutrient): (Brandon = 16)  Teeth (Micronutrient): (missing some)   Micronutrient Evaluation: suspected  deficiency  Micronutrient Evaluation Comments: Na, check iron   Energy Intake (Malnutrition): less than 75% for greater than or equal to 1 month   Orbital Region (Subcutaneous Fat Loss): moderate depletion  Upper Arm Region (Subcutaneous Fat Loss): moderate depletion  Thoracic and Lumbar Region: mild depletion   [place] Region (Muscle Loss): moderate depletion(severe buccal)  Clavicle Bone Region (Muscle Loss): moderate depletion  Clavicle and Acromion Bone Region (Muscle Loss): moderate depletion  Scapular Bone Region (Muscle Loss): moderate depletion  Dorsal Hand (Muscle Loss): mild depletion  Patellar Region (Muscle Loss): well nourished  Anterior Thigh Region (Muscle Loss): well nourished  Posterior Calf Region (Muscle Loss): well nourished     Edema: 4+  Subcutaneous Fat Loss (Final Summary): moderate protein-calorie malnutrition  Muscle Loss Evaluation (Final Summary): moderate protein-calorie malnutrition       Nutrition Follow-Up       yes

## 2020-11-03 NOTE — ASSESSMENT & PLAN NOTE
Continue Prograf at usual dose.  I communicated by secure messaging with Dr. Mcclain, the patient's transplant nephrologist at [facility].  Her recommendation was to keep the trough level around 3 to 4.  Will check Tr level tomorrow morning.

## 2020-11-03 NOTE — PROGRESS NOTES
Pharmacokinetic Assessment Follow Up: IV Vancomycin    Vancomycin serum concentration assessment(s):    The random level was drawn correctly and can be used to guide therapy at this time. The measurement is within the desired definitive target range of 15 to 20 mcg/mL.   Vancomycin Regimen Plan:    Re-dose when the random level is less than 20 mcg/mL, next level to be drawn at 2200 on 11/3    Drug levels (last 3 results):  Recent Labs   Lab Result Units 10/31/20  2008 11/01/20 2036 11/02/20  2138   Vancomycin, Random ug/mL 15.3 13.6 19.6       Pharmacy will continue to follow and monitor vancomycin.    Please contact pharmacy at extension [phone] for questions regarding this assessment.    Thank you for the consult,   Catherine Yao       Patient brief summary:  Alin Burkett is a 70 y.o. male initiated on antimicrobial therapy with IV Vancomycin for treatment of bacteremia    The patient's current regimen is pulse dosing. Last dose was 1000 mg    Drug Allergies:   Review of patient's allergies indicates:  No Known Allergies    Actual Body Weight:   80.1 kg    Renal Function:   Estimated Creatinine Clearance: 32.3 mL/min (A) (based on SCr of 2.3 mg/dL (H)).,     Dialysis Method (if applicable):  intermittent HD (PRN. Pt had dialysis 11/2)    CBC (last 72 hours):  Recent Labs   Lab Result Units 10/31/20  0656 10/31/20  1241 10/31/20  2339 11/01/20  0424 11/01/20  1121 11/01/20 2036 11/02/20  0335 11/02/20  0901 11/02/20  2138   WBC K/uL 4.30  4.30 3.86*  --  4.09 4.00 8.03 21.70* 22.70* 16.98*   Hemoglobin g/dL 7.3*  7.3* 6.7* 12.0* 10.2* 8.8* 7.2* 7.9* 7.2* 6.7*   Hematocrit % 22.8*  22.8* 21.6*  --   --  26.4* 22.5*  --  19.9* 19.4*   Platelets K/uL 194  194 153  --   --  145* 141*  --  155 124*   Gran % % 55.0  --   --   --  57.0 68.0  --   --  73.0   Lymph % % 28.0  --   --   --  21.0 10.0*  --   --  7.0*   Mono % % 7.0  --   --   --  3.0* 7.0  --   --  5.0   Eosinophil % % 1.0  --   --   --  0.0 1.0  --    --  0.0   Basophil % % 0.0  --   --   --  1.0 0.0  --   --  0.0   Differential Method  Manual  --   --   --  Manual Manual  --   --  Manual       Metabolic Panel (last 72 hours):  Recent Labs   Lab Result Units 10/31/20  0656 11/01/20  0424 11/01/20  1802 11/01/20  2359 11/02/20  0335 11/02/20  0522 11/02/20  2138   Sodium mmol/L 134* 136  --   --  135* 134* 136   Sodium, Urine mmol/L  --   --   --  29  --   --   --    Potassium mmol/L 3.7 3.1* 4.1  --  5.0 5.1 3.5   Potassium, Urine mmol/L  --   --   --  27  --   --   --    Chloride mmol/L 117* 119*  --   --  120* 120* 110   Chloride, Urine mmol/L  --   --   --  37  --   --   --    CO2 mmol/L 11* 10*  --   --  <5* <5* 14*   Glucose mg/dL 136* 109  --   --  137* 121* 108   BUN mg/dL 38* 34*  --   --  37* 38* 34*   Creatinine mg/dL 1.8* 1.8*  --   --  2.3* 2.4* 2.3*   Albumin g/dL  --   --   --   --   --  1.7*  --    Total Bilirubin mg/dL  --   --   --   --   --  0.8  --    Alkaline Phosphatase U/L  --   --   --   --   --  41*  --    AST U/L  --   --   --   --   --  47*  --    ALT U/L  --   --   --   --   --  14  --        Vancomycin Administrations:  vancomycin given in the last 96 hours                     vancomycin in dextrose 5 % 1 gram/250 mL IVPB 1,000 mg (mg) 1,000 mg New Bag 11/01/20 2200    vancomycin (VANCOCIN) 500 mg in sodium chloride 0.9% 100 mL ENEMA (mg) 500 mg Given 11/01/20 0807     500 mg Given  0131     500 mg Given 10/31/20 2024    vancomycin 25 mg/mL oral solution 500 mg (mg) 500 mg Given 11/01/20 0129     500 mg Given 10/31/20 2018    vancomycin 500 mg in dextrose 5 % 100 mL IVPB (ready to mix system) (mg) 500 mg New Bag 10/31/20 2142    vancomycin 1.5 g in dextrose 5 % 250 mL IVPB (ready to mix) (mg) 1,500 mg New Bag 10/30/20 2025    vancomycin 25 mg/mL oral solution 500 mg (mg) 500 mg Given 10/30/20 1756    vancomycin 25 mg/mL oral solution 125 mg (mg) 125 mg Given 10/30/20 1050     125 mg Given  0611     125 mg Given 10/29/20 8781                     Microbiologic Results:  Microbiology Results (last 7 days)       Procedure Component Value Units Date/Time    Stool culture [[id number]] Collected: 10/31/20 0050    Order Status: Completed Specimen: Stool Updated: 11/02/20 1235     Stool Culture Culture in progress      Nothing significant to date    Stool culture [[id number]] Collected: 10/31/20 1715    Order Status: Completed Specimen: Stool Updated: 11/02/20 1148     Stool Culture Nothing significant to date    Narrative:      [id number]    E. coli 0157 antigen [[id number]] Collected: 10/31/20 0050    Order Status: Completed Specimen: Stool Updated: 11/02/20 1123     Shiga Toxin 1 E.coli Negative     Shiga Toxin 2 E.coli Negative    E. coli 0157 antigen [[id number]] Collected: 10/31/20 1715    Order Status: Completed Specimen: Stool Updated: 11/02/20 1108     Shiga Toxin 1 E.coli Negative     Shiga Toxin 2 E.coli Negative    Narrative:      30003    Blood culture [[id number]] Collected: 10/30/20 2022    Order Status: Completed Specimen: Blood Updated: 11/02/20 0613     Blood Culture, Routine No Growth to date      No Growth to date      No Growth to date    Narrative:      Change Woods needle and then draw blood culture from the  portacath    C Diff Toxin by PCR [[id number]] Collected: 10/31/20 1715    Order Status: Completed Updated: 11/01/20 1440     C. diff PCR Negative    Narrative:      28366    Urine Culture High Risk [[id number]]  (Abnormal)  (Susceptibility) Collected: 10/29/20 1744    Order Status: Completed Specimen: Urine, Clean Catch Updated: 11/01/20 1350     Urine Culture, Routine PSEUDOMONAS AERUGINOSA  >100,000 cfu/ml      Narrative:      Indicated criteria for high risk culture:->Other  Other (specify):->transplant pt    Blood culture [[id number]]  (Abnormal)  (Susceptibility) Collected: 10/28/20 1728    Order Status: Completed Specimen: Blood Updated: 11/01/20 1050     Blood Culture, Routine Gram stain aer bottle: Gram positive cocci in clusters  resembling Staph       Results called to and read back by: Fifi Li, Charge Nurse       10/30/2020  19:25      STAPHYLOCOCCUS EPIDERMIDIS    Narrative:      DRAW FROM [facility]    Clostridium difficile EIA [[id number]]  (Abnormal) Collected: 10/31/20 1715    Order Status: Completed Specimen: Stool Updated: 11/01/20 0133     C. diff Antigen Positive     C difficile Toxins A+B, EIA Negative     Comment: Testing not recommended for children <24 months old.       Narrative:      06298    Urine culture [[id number]]  (Abnormal)  (Susceptibility) Collected: 10/28/20 1831    Order Status: Completed Specimen: Urine Updated: 10/31/20 0937     Urine Culture, Routine PSEUDOMONAS AERUGINOSA  > 100,000 cfu/ml      Narrative:      Specimen Source->Urine

## 2020-11-03 NOTE — PROGRESS NOTES
"[facility] Hospital Medicine  Progress Note    Patient Name: Alin Burkett  MRN: [id number]  Patient Class: IP- Inpatient   Admission Date: 10/15/2020  Length of Stay: 18 days  Attending Physician: Brennan Decker MD  Primary Care Provider: Carmen Krueger MD        Subjective:     Principal Problem:Lower GI bleeding        HPI:  Patient has been having diarrhea with "jelly-like" consistency as well as crampiness in the abdomen.  Symptoms began roughly one week ago.  Also has had fever and malaise.  Appetite poor.  Fatigue as well.  He has been receiving chemo for PTLD; most recent cycle of CHOP was end of last week.  He has history of renal transplant four years ago and is currently on twice-a-day  Prograf.  He's had no cough, no unusual headache, no sinus pain, and no burning on urination.  He feels that he's probably dehydrated.    Overview/Hospital Course:  No notes on file    Interval History:  Yesterday had total abdominal colectomy.  Still requiring transfusions of RBC.  Was started on hemodialysis today.  It was stopped soon after it began because of hypotension.  No fluid was removed.  He's on one vasopressor.    Review of Systems   Constitutional: Positive for fatigue. Negative for chills and fever.   Respiratory: Negative for cough, shortness of breath and wheezing.    Cardiovascular: Negative for chest pain and leg swelling.   Gastrointestinal: Positive for abdominal pain and blood in stool. Negative for nausea.   Endocrine: Positive for cold intolerance.     Objective:     Vital Signs (Most Recent):  Temp: 97.4 °F (36.3 °C) (11/02/20 1915)  Pulse: 88 (11/02/20 2230)  Resp: (!) 26 (11/02/20 2230)  BP: 107/72 (11/02/20 1915)  SpO2: (!) 94 % (11/02/20 2230) Vital Signs (24h Range):  Temp:  [97.3 °F (36.3 °C)-98.6 °F (37 °C)] 97.4 °F (36.3 °C)  Pulse:  [] 88  Resp:  [19-37] 26  SpO2:  [80 %-100 %] 94 %  BP: ()/(29-78) 107/72  Arterial Line BP: ()/(52-84) 132/74 "     Weight: 80.1 kg (176 lb 9.4 oz)  Body mass index is 24.63 kg/m².    Intake/Output Summary (Last 24 hours) at 11/2/2020 2243  Last data filed at 11/2/2020 1833  Gross per 24 hour   Intake 3461.41 ml   Output 1169 ml   Net 2292.41 ml      Physical Exam  Vitals signs reviewed.   Constitutional:       General: He is not in acute distress.     Appearance: He is ill-appearing. He is not diaphoretic.   HENT:      Mouth/Throat:      Pharynx: No oropharyngeal exudate.   Eyes:      General: No scleral icterus.        Right eye: No discharge.         Left eye: No discharge.   Neck:      Vascular: No JVD.   Cardiovascular:      Rate and Rhythm: Normal rate and regular rhythm.   Pulmonary:      Effort: Tachypnea present.      Breath sounds: Rhonchi present.   Abdominal:      General: The ostomy site is clean. Bowel sounds are normal. There is no distension.      Palpations: Abdomen is soft.      Tenderness: There is no abdominal tenderness.      Comments: New surgical incision with dressing.  Two drains with serosanguinous liquid.   Musculoskeletal:      Right lower leg: Edema present.      Left lower leg: Edema present.   Skin:     General: Skin is warm.      Findings: No rash.   Neurological:      Mental Status: He is alert.         Significant Labs: All pertinent labs within the past 24 hours have been reviewed.    Significant Imaging: I have reviewed all pertinent imaging results/findings within the past 24 hours.      Assessment/Plan:      * Lower GI bleeding  Colonoscopy showed extensive Cdiff colitis with bleeding.  Patient underwent TAC on hospital day 17.    Anemia in stage 3 chronic kidney disease  Hemoglobin   Date Value Ref Range Status   11/02/2020 6.7 (L) 14.0 - 18.0 g/dL Final   11/02/2020 7.2 (L) 14.0 - 18.0 g/dL Final   11/02/2020 7.9 (L) 14.0 - 18.0 g/dL Final   11/01/2020 7.2 (L) 14.0 - 18.0 g/dL Final   11/01/2020 8.8 (L) 14.0 - 18.0 g/dL Final     Patient has anemia of iron deficiency, CKD, and acute  blood loss.  Monitor HGB.  Transfuse for HGB of < 7.5 g/dL.    Iron deficiency        Septic shock  There is septic shock and hemorrhagic shock.  Using vasopressors.  Giving albumin 50 g x 3 doses.  Starting stress dose hydrocortisone.  Monitor BP.      Moderate malnutrition  Nutrition consulted. Body mass index is 24.63 kg/m².. Encourage maximal PO intake. Diet supplementation ordered per nutrition approval. Will encourage PO and monitor closely for weight changes.  We are supplementing calorie intake through parenteral nutrition.      Hypokalemia due to excessive gastrointestinal loss of potassium  Give supplement and monitor level.    Potassium   Date Value Ref Range Status   11/02/2020 3.5 3.5 - 5.1 mmol/L Final   11/02/2020 5.1 3.5 - 5.1 mmol/L Final         Colitis due to Clostridium difficile  ID and GI services following.  On multiple antimicrobials.  Endoscopic exam of colon revealed mucosal abnormality c/w Cdiff and bleeding from throughout colon.  ID consultant was concerned about persistent bleeding from mucosa and the refractory nature of the infection.  We consulted with CRS for colectomy.   TAC and ileostomy took place on hospital day 17.    Antibiotics (From admission, onward)    Start     Stop Route Frequency Ordered    10/31/20 1900  metronidazole IVPB 500 mg  (C. difficile Infection (CDI) Treatment Order Panel)      -- IV Every 8 hours (non-standard times) 10/31/20 1751    10/30/20 2100  fidaxomicin tablet 200 mg      11/09 2059 Oral 2 times daily 10/30/20 1903    10/30/20 2035  vancomycin - pharmacy to dose  (vancomycin IVPB)      -- IV pharmacy to manage frequency 10/30/20 1935          Post-transplant lymphoproliferative disorder (PTLD)  Has been receiving chemo for this.      Acute renal failure superimposed on stage 3 chronic kidney disease  Improving.  Continue monitoring Cr.  Keep MAP > 55.  Nephrologist consultant started him on hemodialysis.  Was attempted today but stopped b/c of  hypotension.    Lab Results   Component Value Date    CREATININE 2.3 (H) 2020             -donor kidney transplant  Continue Prograf at usual dose.  I communicated by secure messaging with Dr. Mcclain, the patient's transplant nephrologist at [facility].  Her recommendation was to keep the trough level around 3 to 4.  Will check Tr level tomorrow morning.      Acquired hypothyroidism  Continue levothyroxine.    Hyperchloremic acidosis  Due to bicarb loss through GI tract.  Continue bicarb supplement by IV.  Bicarb level is improving.    CO2   Date Value Ref Range Status   2020 14 (L) 23 - 29 mmol/L Final         VTE Risk Mitigation (From admission, onward)         Ordered     heparin (porcine) injection 3,000 Units  As needed (PRN)      20 1230     IP VTE LOW RISK PATIENT  Once      10/15/20 2220                Discharge Planning   TRINH: 10/26/2020     Code Status: Full Code   Is the patient medically ready for discharge?: Yes    Reason for patient still in hospital (select all that apply): Patient unstable, Patient trending condition, Laboratory test and Treatment  Discharge Plan A: Home with family            Critical care time spent on the evaluation and treatment of severe organ dysfunction, review of pertinent labs and imaging studies, discussions with consulting providers and discussions with patient/family: 52 minutes.      Brennan Decker MD  Department of Hospital Medicine   [facility]

## 2020-11-03 NOTE — PROGRESS NOTES
"Progress Note  Infectious Disease    Reason for Consult:  C difficile colitis, neutropenia, sepsis    HPI: Alin Burkett is a   70 y.o. male who is status post renal transplant and is receiving chemotherapy for diffuse large B-cell lymphoma, presented to the emergency room on 10/15 with worsening of chronic diarrhea, abdominal cramps of 1 weeks duration.  He was found to be neutropenic with a white blood cell count of 0.2, volume depleted, hypokalemic, was cultured and given fluid resuscitation and vancomycin and cefepime.  He was admitted to the intensive care unit. He was recently given cefpodoxime to take prophylactically associated with his chemotherapy for recurring urinary tract infections.  Most recent positive urine culture was September 18th with E coli sensitive to 3rd generation cephalosporins carbapenems  He has had a hectic fever, stool for C difficile toxin was obtained and was resulted as positive today.  he has had a positive C difficile test before, August 2019.  White blood cells remain depressed at 0.1, platelets are depressed at 91,000, creatinine 1.6.  CT scan of the abdomen obtained last night shows severe proctocolitis without megacolon. Neupogen has been ordered. He is discouraged from eating by severe cramps.      10/17/2020 tmax is improved. Continues to be neutropenic Continues to have cramping, intermittent, abdominal pain. + diarrhea "lost count how many" He had refused vancomycin in am but decided to take it now  10/18/2020 afebrile,(103 on 16th)  ANC improved on granix 0---1029) he is feeling much better today.  Less abdominal pain.  Less loose stools.  10/19/2020.  Afebrile.  T-max 99°.  Less need for pressors.  WBC 5.  Creatinine slightly worse at 1.9.  Discussed with nurse.  Patient may have had some hallucinations earlier  10/20/2020.  Afebrile. Less abdominal pain. Less diarrhea. He feels his abdomen is still distended and does not allow him to take a deep breath. KUB  Is read " as below, I feel that transvere colon is a little bigger today. Creatinine has worsened. Bicarb is 9. Nephrologist is giving bicarb drip.  Las Vancomycin iv and cefepime were  given on 18   Tolerating vancomycin by mouth and metronidazole    10/21: interim reviewed d/w Dr. Jiménez. Requiring bicarb drip. WBC 14 after neupogen, platelets better. Cr stable. Severe hypoalbuminemia. KUB not ominous, CXR with blunted left CPA. Wife reports he is sleeping all of the time and not eating. I aroused him and he agreed to eat some pudding  10/22: interim reviewed. Renal function slightly worse, bicarb corrected to 18 on drip. Urine output is poor and incontinent, midodrine started. Not eating.   10/23:  Interim reviewed, afebrile, oxygenating normally, off norepinephrine since administration of my do drain.  Stools becoming more solid, 2 measured since 0 700, still having incontinence.  Able to do some physical therapy exercises but physical therapy recommends LTAC/rehab/SNF.  Urine output is good, creatinine has stabilized 2.8, white blood cells normal.  CO2 still low at 15.  No new imaging. Slept during visit, discussed with RN and wife.   10/24:  Afebrile, oxygenating well, sleeping and not eating, urine output is very good, creatinine improved, stools with improving form  10/25: low grade temp this afternoon. No stools recorded yet but nursing reports 10 small stools. Patient reports urinary incontinence but waiting until urge before trying to void. Offered condom catheter.. Creatinine a little better. No appetite. Very irritable about being pushed to eat.  10/26: afebrile. Continues to have incontinent loose stools. Declining much physical therapy. Referral sent to LTAC. Cr better. Questran was discontinued, not sure why. He is asleep and I did not disturb, but spoke with his brother.   10/27:alert and interactive this evening. After discussion, he intends to try to eat(he fears cramping) and get up to a chair tomorrow. D/w  patient and wife about LTAC. Discouraged going home with TPN.   10/29: no fever, sats good, 550 cc of stool recorded for yesterday, white blood cells normal, hemoglobin a little lower, creatinine is 2, urinalysis had 50 white cells, urine culture pending but this was obtained from a urinal and not as a clean-catch specimen and will be canceled., blood culture negative so far.  He ate less than 50% of his breakfast and has not been up to the chair as of 11:00 a.m..  He is agreeable to change anti spasmodic.  P.r.n.  10/30: afebrile. Grossly bloody stools in flexiseal. hgb did drop from 9.2 to 8.2. EGD and FFS tomorrow. first urine culture was taken from urinal. Second one obtained by proper method(after d/w RN). Both have pseudomonas. hehas no tenderness over transplant kidney and no dysuria. Blood culture taken from port last pm has GPC. Changing jimenez tonight and repeat culture. He states he is eating more but this is not accurate.  10/31: called to endoscopy lab to see wall to wall pseudomembranes, active generalized colonic bleeding. Patient required blood transfusion and volume expansion prior to the procedure. He will be moved to ICU after procedure. Discussed with Dr. Edwards and patient's wife and Dr. Decker that he should have a colectomy.   11/2: pt in OR on rounds yesterday. Spoke with wife and checked chart several times. Today acidemia required initiation of HD, trialysis placed, more blood given. Antibiotics simplified to Vanc IV (I think the one blood culture with CoNS from PAC was a contaminant), cefepime (for pseudomonas UTI) and IV flagyl. Also on mycamine pending esophageal biopsies. Stopped oral vanc, vanc enemas, dificid and eravacycline last night.     Antibiotics (From admission, onward)    Start     Stop Route Frequency Ordered    10/31/20 1900  metronidazole IVPB 500 mg  (C. difficile Infection (CDI) Treatment Order Panel)      -- IV Every 8 hours (non-standard times) 10/31/20 1751    10/30/20  2100  fidaxomicin tablet 200 mg      11/09 2059 Oral 2 times daily 10/30/20 1903    10/30/20 2045  cefepime in dextrose 5 % 1 gram/50 mL IVPB 1 g      -- IV Every 12 hours (non-standard times) 10/30/20 1934    10/30/20 2035  vancomycin - pharmacy to dose  (vancomycin IVPB)      -- IV pharmacy to manage frequency 10/30/20 1935             EXAM & DIAGNOSTICS REVIEWED:   Vitals:     Temp:  [97.3 °F (36.3 °C)-98.8 °F (37.1 °C)]   Temp: 97.6 °F (36.4 °C) (11/02/20 1833)  Pulse: 87 (11/02/20 1833)  Resp: (!) 29 (11/02/20 1833)  BP: 102/70 (11/02/20 1833)  SpO2: 100 % (11/02/20 1833)    Intake/Output Summary (Last 24 hours) at 11/2/2020 1924  Last data filed at 11/2/2020 1833  Gross per 24 hour   Intake 4393.66 ml   Output 1374 ml   Net 3019.66 ml        General:  In NAD.  Sleeping, generally edematous  Eyes:   anicteric, EOMI  ENT:   no oral lesions  Neck:  Supple   Lungs: Bibasilar crackles  Heart:  RRR, no gallop/murmur/rub noted  Abd:  Soft,   Rectal tube in place with small volume bloody secretions. BS positive, ileostomy RLQ with serous fluid  :  Voids   no flank tenderness  Musc:  Joints without effusion, swelling, erythema, synovitis,    Skin:  No rashes.    Wound:   Neuro: sleeping.    Psych:     sleeping.    Extrem: generalized edema, no erythema, phlebitis, cellulitis, warm and well perfused  VAD:  portacath without redness, drainage, left IJ TLC    Isolation:  none         General Labs reviewed:  Recent Labs   Lab 11/01/20  1121 11/01/20 2036 11/02/20  0335 11/02/20  0901   WBC 4.00 8.03 21.70* 22.70*   HGB 8.8* 7.2* 7.9* 7.2*   HCT 26.4* 22.5*  --  19.9*   * 141*  --  155       Recent Labs   Lab 11/01/20  0424 11/01/20  1121 11/01/20  1802 11/02/20  0335 11/02/20  0522     --   --  135* 134*   K 3.1*  --  4.1 5.0 5.1   *  --   --  120* 120*   CO2 10*  --   --  <5* <5*   BUN 34*  --   --  37* 38*   CREATININE 1.8*  --   --  2.3* 2.4*   CALCIUM 7.3* 6.9*  --  6.7* 6.7*   PROT  --   --   --    --  4.3*   BILITOT  --   --   --   --  0.8   ALKPHOS  --   --   --   --  41*   ALT  --   --   --   --  14   AST  --   --   --   --  47*     Micro:  Microbiology Results (last 7 days)     Procedure Component Value Units Date/Time    Stool culture [[id number]] Collected: 10/31/20 0050    Order Status: Completed Specimen: Stool Updated: 11/02/20 1235     Stool Culture Culture in progress      Nothing significant to date    Stool culture [[id number]] Collected: 10/31/20 1715    Order Status: Completed Specimen: Stool Updated: 11/02/20 1148     Stool Culture Nothing significant to date    Narrative:      [id number]    E. coli 0157 antigen [[id number]] Collected: 10/31/20 0050    Order Status: Completed Specimen: Stool Updated: 11/02/20 1123     Shiga Toxin 1 E.coli Negative     Shiga Toxin 2 E.coli Negative    E. coli 0157 antigen [[id number]] Collected: 10/31/20 1715    Order Status: Completed Specimen: Stool Updated: 11/02/20 1108     Shiga Toxin 1 E.coli Negative     Shiga Toxin 2 E.coli Negative    Narrative:      [id number]    Blood culture [[id number]] Collected: 10/30/20 2022    Order Status: Completed Specimen: Blood Updated: 11/02/20 0613     Blood Culture, Routine No Growth to date      No Growth to date      No Growth to date    Narrative:      Change Woods needle and then draw blood culture from the  portacat    C Diff Toxin by PCR [[id number]] Collected: 10/31/20 1715    Order Status: Completed Updated: 11/01/20 1440     C. diff PCR Negative    Narrative:      [id number]    Urine Culture High Risk [[id number]]  (Abnormal)  (Susceptibility) Collected: 10/29/20 1744    Order Status: Completed Specimen: Urine, Clean Catch Updated: 11/01/20 1350     Urine Culture, Routine PSEUDOMONAS AERUGINOSA  >100,000 cfu/ml      Narrative:      Indicated criteria for high risk culture:->Other  Other (specify):->transplant pt    Blood culture [[id number]]  (Abnormal)  (Susceptibility) Collected: 10/28/20 1728    Order Status: Completed Specimen:  Blood Updated: 11/01/20 1050     Blood Culture, Routine Gram stain aer bottle: Gram positive cocci in clusters resembling Staph       Results called to and read back by: Fifi Li, Charge Nurse       10/30/2020  19:25      STAPHYLOCOCCUS EPIDERMIDIS    Narrative:      DR[facility] FROM [facility]    Clostridium difficile EIA [[id number]]  (Abnormal) Collected: 10/31/20 1715    Order Status: Completed Specimen: Stool Updated: 11/01/20 0133     C. diff Antigen Positive     C difficile Toxins A+B, EIA Negative     Comment: Testing not recommended for children <24 months old.       Narrative:      [id number]    Urine culture [[id number]]  (Abnormal)  (Susceptibility) Collected: 10/28/20 1831    Order Status: Completed Specimen: Urine Updated: 10/31/20 0937     Urine Culture, Routine PSEUDOMONAS AERUGINOSA  > 100,000 cfu/ml      Narrative:      Specimen Source->Urine        Imaging Reviewed:   KUBThree round radiodensities overlie the left upper quadrant may represent ingested pills or be in the patient's clothing.  A radiodense thread is noted over the right lower quadrant of uncertain significance.   CXR clear   CT abdomen and pelvis 10/16 :    Findings consistent with severe proctocolitis.    Transplanted kidney right side of the pelvis with mild pelvocaliectasis and perinephric stranding nonspecific.  No calcified stones seen Additional findings as detailed above including cystic lesion with in the native right kidney versus dilatation of collecting structure of the native right kidney.  Stones in the native right kidney.  Cystic lesion within the pancreas  Cardiology:    IMPRESSION & PLAN   1. Severe C. Difficile colitis, improved from ICU status of septic shock, but still having diarrhea despite aggressive treatment     Prompted by oral antibiotics and/or chemotherapy   History of Cdiff 8/2019    REFRACTORY SEVERE PSEUDOMEMBRANOUS COLITIS WITH GENERALIZED COLONIC BLEEDING. S/p colectomy 11/1    2. PTLD, EBV related lymphoma,  receiving chemotherapy      neutropenia, resolved  3. S/p renal transplant on immunosuppression, DIVINE, refractory acidemia requiring initiation of dialysis 11/2  4. Chronic diarrhea  5. History of recurrent UTIs  6. Anorexia  7. Pseudomonas UTI 10/29(1st cx from urinal , second culture is CCMS)  8. GI bleeding 10/30  9. Blood culture with GPC 10/29, significance unknown    Recommendations:  Continue vanc, cefepime and flagyl, mycamine  To Dr. Guerrero: do you think there was enough rectum left to require treatment of this mucosa for Cdiff, which would require enema?

## 2020-11-03 NOTE — MEDICAL/APP STUDENT
"[facility]  Progress Note    Patient Name: Alin Burkett  MRN: [id number]  Patient Class: IP- Inpatient   Admission Date: 10/15/2020  Length of Stay: 19 days  Attending Physician: Brennan Decker MD  Primary Care Provider: Carmen Krueger MD    Subjective:     Chief Complaint: Lower GI bleeding 2/2 C. Diff colitis    HPI: Mr Burkett is a 70 y.o. M with PMHx CKD s/p renal transplant on Prograf BID, Post-transplant lymphoproliferative disease (B-cell lymphoma) with active chemo treatment plan, HTN, BPH, and LOUISA (no use of CPAP) who presented with diarrhea of "jelly-like" consistency and abdominal cramping. Symptoms began 1 week prior to presentation. He has had a decreased PO intake 2/2 poor appetite, fatigue, fever, and malaise. He has current diagnosis of B cell lymphoma (PTLD) and most recent dose of R-CHOP (rituximab, cyclophosphamide, doxorubicin, vincristine, prednisone) on 10/09. He denies cough, headache, sinus pain, dysuria.     Overview/Hospital Course:  Admitted to ICU on 10/15 for septic shock in the context of persistent diarrhea. Full septic workup initiated and patient started on IV norepinephrine and fluids for hypotension. Broad spectrum antibiotics vancomycin and cefepime initiated 10/15. Found to have pseudomonas UTI and positive for C. Difficile, antibiotics managed accordingly. Initially responded well to treatment including IV vanc, vanc enemas, and diflicid, but began to have worsening and bloody diarrhea starting 10/30. Underwent colonoscopy which revealed severe inflammation and active bleeding enterocolitis. Was transfused 3 units pRBC due to Hgb 6.8 (10/31). Decision was made to proceed with colectomy due to failure of treatment regimen and active bleeding. Patient found to have blood antibodies, making blood availability a concern. Attempted transfer to [facility], however, center was diverting critical care patients at the time. Patient underwent colectomy and " cholecystectomy at [facility] on 11/01 with significant blood loss of 1L. Patient significantly acidotic despite medical management, dialysis attempted 11/02 but aborted due to hypotension.    Interval History:   Tachypnea to 30s overnight, placed on non-re breather, transitioned to 6L NC this AM. Breathing is improved. Dialysis attempted yesterday but was ended due to significant hypotension. Patient received 1 unit pRBC on HD. BP stable overnight (SBP~140-150s), not requiring vasopressors at this point. Was on dialysis upon my exam this morning. H/H critically low overnight and this AM (6.3/18). 2 units pRBC transfused per surgery.     Review of Systems   Constitutional: Positive for malaise/fatigue. Negative for chills and fever.   Respiratory: Positive for sputum production, shortness of breath and wheezing.    Gastrointestinal: Positive for abdominal pain and blood in stool.   Neurological: Positive for weakness. Negative for headaches.   Endo/Heme/Allergies:        Cold intolerance       Objective:      Vitals:    11/03/20 0600 11/03/20 0615 11/03/20 0630 11/03/20 0645   BP:       BP Location:       Patient Position:       Pulse: 85 88 87 84   Resp: 14 18 17 18   Temp:  97.3 °F (36.3 °C)     TempSrc:  Axillary     SpO2: (!) 94% (!) 94% 95% (!) 94%   Weight:       Height:           Intake/Output Summary (Last 24 hours) at 11/3/2020 0808  Last data filed at 11/3/2020 0600  Gross per 24 hour   Intake 5282.88 ml   Output 704 ml   Net 4578.88 ml     Physical Exam   Constitutional: He is oriented to person, place, and time.   ill-appearing, mild respiratory distress   HENT:   Head: Normocephalic.   Head wrapped with towel   Eyes: Pupils are equal, round, and reactive to light.   Neck: Normal range of motion.   External jugular trialysis catheter in place   Cardiovascular:   Murmur heard.  tachycardic   Pulmonary/Chest: He is in respiratory distress. He has wheezes. He has rhonchi in the right lower field and the  left lower field.   Increased respiratory effort and rate, improved from yesterday  Coarse crackles bilaterally   Abdominal: Soft. There is abdominal tenderness.   Large midline dressing  BL RIO drains in place draining serosanguinous fluid  RLQ stoma intact  Minimal to none bloody output from rectal tube   Musculoskeletal:         General: Edema present. No tenderness.   Neurological: He is alert and oriented to person, place, and time. He displays weakness.   Somnolent   Skin: Skin is dry.   Skin weeping with edema       Assessment/Plan:     Septic/Hemorrhagic Shock in context of C. Diff colitis  Failed initial therapies of vancomycin, metronidazole, and fidaxomicin. S/P TAC (11/01) with end ileostomy and cholecystectomy.   - H/H critical this AM, 6.3/18; Surgery ordered 2 units pRBC transfused slowly  - Continue antibiotics (IV Vanc, IV flagyl, Cefemine (pseudomonas UTI))    Hypotension  - Dialysis on 11/02 aborted due to hypotension; BP now stable (SBP 150s) at this point, not needing vasopressor support  - Started on stress dose steroids Q6    Hypocalcemia  - Ionized calcium critical at 0.9  - replacement with IV calcium gluconate    Acute Kidney injury  - Cr 2.4 this AM, BUN 38; refractory metabolic acidosis  - On bicarb drip  - Dialysis access obtained 11/02, attempted dialysis and aborted due to hypotension; will retry today   -Will re-dose medications for dialysis    Moderate Malnutrition  Nutrition consulted, BMI 24.63. Patient is on TPN    Non-anion gap metabolic acidosis  Bicarb slowly trending back up. On bicarbonate IV.   - Dialysis today    Hyperkalemia  - PO Potassium citrate dose changed to BID, may increase after acidosis corrects with dialysis    Anasarca  - Albumin 1.7 (11/02); On clinimix  - Started on albumin 50g x 3 doses on 11/02    PTLD B Cell Lymphoma  - Active treatment plan includes R-CHOP; most recent dosing 10/09  - Heme/onc following      Active Diagnoses:    Diagnosis Date Noted POA     PRINCIPAL PROBLEM:  Lower GI bleeding [K92.2] 10/30/2020 No    Septic shock [A41.9, R65.21]  No    Iron deficiency [E61.1]  No    Anemia in stage 3 chronic kidney disease [N18.30, D63.1]  No    Moderate malnutrition [E44.0] 10/20/2020 Yes    Colitis due to Clostridium difficile [A04.72] 10/15/2020 No    Hypokalemia due to excessive gastrointestinal loss of potassium [E87.6] 10/15/2020 Yes    Post-transplant lymphoproliferative disorder (PTLD) [T86.99, D47.Z1] 2020 Yes    Acute renal failure superimposed on stage 3 chronic kidney disease [N17.9, N18.30] 2017 Yes    -donor kidney transplant [Z94.0]  Not Applicable    Acquired hypothyroidism [E03.9] 01/10/2014 Yes    Hyperchloremic acidosis [E87.2]  Yes      Problems Resolved During this Admission:    Diagnosis Date Noted Date Resolved POA    Sepsis [A41.9] 10/24/2020 10/27/2020 Yes    Febrile neutropenia [D70.9, R50.81] 10/15/2020 10/29/2020 Yes     VTE Risk Mitigation (From admission, onward)         Ordered     heparin (porcine) injection 3,000 Units  As needed (PRN)      20 1230     IP VTE LOW RISK PATIENT  Once      10/15/20 2220                Davida Mccray, MS3  [facility]

## 2020-11-03 NOTE — ASSESSMENT & PLAN NOTE
Patient's anemia is currently uncontrolled.  There is anemia of acute blood loss on top of anemia from chemo.      Hemoglobin   Date Value Ref Range Status   11/02/2020 6.7 (L) 14.0 - 18.0 g/dL Final   11/02/2020 7.2 (L) 14.0 - 18.0 g/dL Final   11/02/2020 7.9 (L) 14.0 - 18.0 g/dL Final   11/01/2020 7.2 (L) 14.0 - 18.0 g/dL Final   11/01/2020 8.8 (L) 14.0 - 18.0 g/dL Final     Monitor serial CBC and transfuse if patient becomes hemodynamically unstable, symptomatic or H/H drops below 7/21.

## 2020-11-03 NOTE — PLAN OF CARE
Not requiring vasopressor support at this time. SBP in the 140's- 150's. CO of SOB and air hunger, Tachypneic in the mid 30's, SpO2 90's, coarse lung sounds bilaterally. Patient placed on non-rebreather and administered Morphine. Able to wean back to 6 L NC with SpO2 93-94. Slowly gave one unit of PRBC's, rechecked H/H on early am labs following infusion, the H/H still critical at 6.3/18. Started 2nd unit ordered by general surgery last night per Dr. Guerrero. Will need to be rechecked following infusion. Ionized Calcium critical at 0.9, replaced per protocol. RIO drains with minimal output of 50 ml's on L and 15 on R. UOP minimal of 200 ml's. Nephrology to dialyze today.

## 2020-11-03 NOTE — ASSESSMENT & PLAN NOTE
Due to bicarb loss through GI tract.  Continue bicarb supplement by IV.  Bicarb level is improving.    CO2   Date Value Ref Range Status   11/02/2020 14 (L) 23 - 29 mmol/L Final

## 2020-11-03 NOTE — ASSESSMENT & PLAN NOTE
There is septic shock and hemorrhagic shock.  Using vasopressors.  Giving albumin 50 g x 3 doses.  Starting stress dose hydrocortisone.  Monitor BP.

## 2020-11-03 NOTE — SUBJECTIVE & OBJECTIVE
Interval History:  Yesterday had total abdominal colectomy.  Still requiring transfusions of RBC.  Was started on hemodialysis today.  It was stopped soon after it began because of hypotension.  No fluid was removed.  He's on one vasopressor.    Review of Systems   Constitutional: Positive for fatigue. Negative for chills and fever.   Respiratory: Negative for cough, shortness of breath and wheezing.    Cardiovascular: Negative for chest pain and leg swelling.   Gastrointestinal: Positive for abdominal pain and blood in stool. Negative for nausea.   Endocrine: Positive for cold intolerance.     Objective:     Vital Signs (Most Recent):  Temp: 97.4 °F (36.3 °C) (11/02/20 1915)  Pulse: 88 (11/02/20 2230)  Resp: (!) 26 (11/02/20 2230)  BP: 107/72 (11/02/20 1915)  SpO2: (!) 94 % (11/02/20 2230) Vital Signs (24h Range):  Temp:  [97.3 °F (36.3 °C)-98.6 °F (37 °C)] 97.4 °F (36.3 °C)  Pulse:  [] 88  Resp:  [19-37] 26  SpO2:  [80 %-100 %] 94 %  BP: ()/(29-78) 107/72  Arterial Line BP: ()/(52-84) 132/74     Weight: 80.1 kg (176 lb 9.4 oz)  Body mass index is 24.63 kg/m².    Intake/Output Summary (Last 24 hours) at 11/2/2020 2243  Last data filed at 11/2/2020 1833  Gross per 24 hour   Intake 3461.41 ml   Output 1169 ml   Net 2292.41 ml      Physical Exam  Vitals signs reviewed.   Constitutional:       General: He is not in acute distress.     Appearance: He is ill-appearing. He is not diaphoretic.   HENT:      Mouth/Throat:      Pharynx: No oropharyngeal exudate.   Eyes:      General: No scleral icterus.        Right eye: No discharge.         Left eye: No discharge.   Neck:      Vascular: No JVD.   Cardiovascular:      Rate and Rhythm: Normal rate and regular rhythm.   Pulmonary:      Effort: Tachypnea present.      Breath sounds: Rhonchi present.   Abdominal:      General: The ostomy site is clean. Bowel sounds are normal. There is no distension.      Palpations: Abdomen is soft.      Tenderness: There is  no abdominal tenderness.      Comments: New surgical incision with dressing.  Two drains with serosanguinous liquid.   Musculoskeletal:      Right lower leg: Edema present.      Left lower leg: Edema present.   Skin:     General: Skin is warm.      Findings: No rash.   Neurological:      Mental Status: He is alert.         Significant Labs: All pertinent labs within the past 24 hours have been reviewed.    Significant Imaging: I have reviewed all pertinent imaging results/findings within the past 24 hours.

## 2020-11-03 NOTE — ASSESSMENT & PLAN NOTE
Hemoglobin   Date Value Ref Range Status   11/02/2020 6.7 (L) 14.0 - 18.0 g/dL Final   11/02/2020 7.2 (L) 14.0 - 18.0 g/dL Final   11/02/2020 7.9 (L) 14.0 - 18.0 g/dL Final   11/01/2020 7.2 (L) 14.0 - 18.0 g/dL Final   11/01/2020 8.8 (L) 14.0 - 18.0 g/dL Final     Patient has anemia of iron deficiency, CKD, and acute blood loss.  Monitor HGB.  Transfuse for HGB of < 7.5 g/dL.

## 2020-11-03 NOTE — ASSESSMENT & PLAN NOTE
ID and GI services following.  On multiple antimicrobials.  Endoscopic exam of colon revealed mucosal abnormality c/w Cdiff and bleeding from throughout colon.  ID consultant was concerned about persistent bleeding from mucosa and the refractory nature of the infection.  We consulted with CRS for colectomy.   TAC and ileostomy took place on hospital day 17.    Antibiotics (From admission, onward)    Start     Stop Route Frequency Ordered    10/31/20 1900  metronidazole IVPB 500 mg  (C. difficile Infection (CDI) Treatment Order Panel)      -- IV Every 8 hours (non-standard times) 10/31/20 1751    10/30/20 2100  fidaxomicin tablet 200 mg      11/09 2059 Oral 2 times daily 10/30/20 1903    10/30/20 2035  vancomycin - pharmacy to dose  (vancomycin IVPB)      -- IV pharmacy to manage frequency 10/30/20 1935

## 2020-11-03 NOTE — ASSESSMENT & PLAN NOTE
Colonoscopy showed extensive Cdiff colitis with bleeding.  Patient underwent TAC on hospital day 17.

## 2020-11-04 PROBLEM — K92.2 LOWER GI BLEEDING: Status: RESOLVED | Noted: 2020-01-01 | Resolved: 2020-01-01

## 2020-11-04 NOTE — PLAN OF CARE
CM called [facility] at [phone] and spoke with Josette. Call reference # [id number]. Per Josette to start a fast appeal, medical records, member #, hospital NPI #, and call back number need to be faxed to [phone]. Stated fast appeal usually take up to 72 hours for a decision.  CM faxed all needed information to fax number above. CM will continue to follow.     1228- CM attempted to reach pt's Jayden VILLALOBOS at [phone]. No answer and unable to leave . CM will try again at later time.       1400- CM received call from [facility] Lola bean. Per Lola, they have received fast appeal request and will start reviewing.      11/04/20 1221   Post-Acute Status   Post-Acute Authorization Placement   Post-Acute Placement Status Pending Payor Medical Review

## 2020-11-04 NOTE — SUBJECTIVE & OBJECTIVE
Interval History:  Pressures better today.  Had complete session of dialysis today and transfusion of more blood.  He is off vasopressors!    Review of Systems   Constitutional: Positive for fatigue. Negative for chills and fever.   Respiratory: Negative for cough, shortness of breath and wheezing.    Cardiovascular: Negative for chest pain and leg swelling.   Gastrointestinal: Positive for abdominal pain and blood in stool. Negative for nausea.     Objective:     Vital Signs (Most Recent):  Temp: 96.9 °F (36.1 °C) (11/03/20 1600)  Pulse: 66 (11/03/20 1845)  Resp: 11 (11/03/20 1845)  BP: (!) 153/79 (11/03/20 0400)  SpO2: 96 % (11/03/20 1845) Vital Signs (24h Range):  Temp:  [96.5 °F (35.8 °C)-97.8 °F (36.6 °C)] 96.9 °F (36.1 °C)  Pulse:  [66-96] 66  Resp:  [11-34] 11  SpO2:  [92 %-97 %] 96 %  BP: (115-153)/(61-79) 153/79  Arterial Line BP: (115-162)/(61-84) 157/82     Weight: 80.1 kg (176 lb 9.4 oz)  Body mass index is 24.63 kg/m².    Intake/Output Summary (Last 24 hours) at 11/3/2020 2202  Last data filed at 11/3/2020 1800  Gross per 24 hour   Intake 7896.88 ml   Output 2756 ml   Net 5140.88 ml      Physical Exam  Vitals signs reviewed.   Constitutional:       General: He is not in acute distress.     Appearance: He is ill-appearing. He is not diaphoretic.   HENT:      Mouth/Throat:      Pharynx: No oropharyngeal exudate.   Eyes:      General: No scleral icterus.        Right eye: No discharge.         Left eye: No discharge.   Neck:      Vascular: No JVD.   Cardiovascular:      Rate and Rhythm: Normal rate and regular rhythm.   Pulmonary:      Effort: Pulmonary effort is normal.      Breath sounds: Examination of the right-lower field reveals rales. Examination of the left-lower field reveals rales. Rales present.   Abdominal:      General: The ostomy site is clean. Bowel sounds are normal. There is no distension.      Palpations: Abdomen is soft.      Tenderness: There is no abdominal tenderness.      Comments:  New surgical incision with dressing.  Two drains with serosanguinous liquid.   Musculoskeletal:      Right lower leg: Edema present.      Left lower leg: Edema present.   Skin:     General: Skin is warm.      Findings: No rash.   Neurological:      Mental Status: He is alert.         Significant Labs: All pertinent labs within the past 24 hours have been reviewed.    Significant Imaging: I have reviewed all pertinent imaging results/findings within the past 24 hours.

## 2020-11-04 NOTE — PLAN OF CARE
Per Migdalia with [facility], she cannot initiate another auth due to previous request being less than 30 days ago. CM will initiate a fast appeal.        11/04/20 0839   Post-Acute Status   Post-Acute Authorization Placement   Post-Acute Placement Status Insurance Denial

## 2020-11-04 NOTE — PROGRESS NOTES
"[facility] Hospital Medicine  Progress Note    Patient Name: Alin Burkett  MRN: [id number]  Patient Class: IP- Inpatient   Admission Date: 10/15/2020  Length of Stay: 19 days  Attending Physician: Brennan Decker MD  Primary Care Provider: Carmen Krueger MD        Subjective:     Principal Problem:Lower GI bleeding        HPI:  Patient has been having diarrhea with "jelly-like" consistency as well as crampiness in the abdomen.  Symptoms began roughly one week ago.  Also has had fever and malaise.  Appetite poor.  Fatigue as well.  He has been receiving chemo for PTLD; most recent cycle of CHOP was end of last week.  He has history of renal transplant four years ago and is currently on twice-a-day  Prograf.  He's had no cough, no unusual headache, no sinus pain, and no burning on urination.  He feels that he's probably dehydrated.    Overview/Hospital Course:  No notes on file    Interval History:  Pressures better today.  Had complete session of dialysis today and transfusion of more blood.  He is off vasopressors!    Review of Systems   Constitutional: Positive for fatigue. Negative for chills and fever.   Respiratory: Negative for cough, shortness of breath and wheezing.    Cardiovascular: Negative for chest pain and leg swelling.   Gastrointestinal: Positive for abdominal pain and blood in stool. Negative for nausea.     Objective:     Vital Signs (Most Recent):  Temp: 96.9 °F (36.1 °C) (11/03/20 1600)  Pulse: 66 (11/03/20 1845)  Resp: 11 (11/03/20 1845)  BP: (!) 153/79 (11/03/20 0400)  SpO2: 96 % (11/03/20 1845) Vital Signs (24h Range):  Temp:  [96.5 °F (35.8 °C)-97.8 °F (36.6 °C)] 96.9 °F (36.1 °C)  Pulse:  [66-96] 66  Resp:  [11-34] 11  SpO2:  [92 %-97 %] 96 %  BP: (115-153)/(61-79) 153/79  Arterial Line BP: (115-162)/(61-84) 157/82     Weight: 80.1 kg (176 lb 9.4 oz)  Body mass index is 24.63 kg/m².    Intake/Output Summary (Last 24 hours) at 11/3/2020 2202  Last data filed at 11/3/2020 " 1800  Gross per 24 hour   Intake 7896.88 ml   Output 2756 ml   Net 5140.88 ml      Physical Exam  Vitals signs reviewed.   Constitutional:       General: He is not in acute distress.     Appearance: He is ill-appearing. He is not diaphoretic.   HENT:      Mouth/Throat:      Pharynx: No oropharyngeal exudate.   Eyes:      General: No scleral icterus.        Right eye: No discharge.         Left eye: No discharge.   Neck:      Vascular: No JVD.   Cardiovascular:      Rate and Rhythm: Normal rate and regular rhythm.   Pulmonary:      Effort: Pulmonary effort is normal.      Breath sounds: Examination of the right-lower field reveals rales. Examination of the left-lower field reveals rales. Rales present.   Abdominal:      General: The ostomy site is clean. Bowel sounds are normal. There is no distension.      Palpations: Abdomen is soft.      Tenderness: There is no abdominal tenderness.      Comments: New surgical incision with dressing.  Two drains with serosanguinous liquid.   Musculoskeletal:      Right lower leg: Edema present.      Left lower leg: Edema present.   Skin:     General: Skin is warm.      Findings: No rash.   Neurological:      Mental Status: He is alert.         Significant Labs: All pertinent labs within the past 24 hours have been reviewed.    Significant Imaging: I have reviewed all pertinent imaging results/findings within the past 24 hours.      Assessment/Plan:      * Lower GI bleeding  Colonoscopy showed extensive Cdiff colitis with bleeding.  Patient underwent TAC on hospital day 17.    Anemia in stage 3 chronic kidney disease  Hemoglobin   Date Value Ref Range Status   11/03/2020 6.3 (L) 14.0 - 18.0 g/dL Final   11/02/2020 6.7 (L) 14.0 - 18.0 g/dL Final   11/02/2020 7.2 (L) 14.0 - 18.0 g/dL Final   11/02/2020 7.9 (L) 14.0 - 18.0 g/dL Final   11/01/2020 7.2 (L) 14.0 - 18.0 g/dL Final     Patient has anemia of iron deficiency, CKD, and acute blood loss.  Monitor HGB.  Transfuse for HGB of <  7.5 g/dL.    Iron deficiency  Noted.  Will recheck iron levels at some point.      Septic shock  There is septic shock and hemorrhagic shock.  Much improved after being given albumin and stress dose SoluCortef.  Was able to be weaned off of vasopressors.  Monitor blood pressure.      Moderate malnutrition  Nutrition consulted. Body mass index is 24.63 kg/m².. Encourage maximal PO intake. Diet supplementation ordered per nutrition approval. Will encourage PO and monitor closely for weight changes.  We are supplementing calorie intake through parenteral nutrition.      Hypokalemia due to excessive gastrointestinal loss of potassium  Give supplement and monitor level.    Potassium   Date Value Ref Range Status   2020 3.5 3.5 - 5.1 mmol/L Final   2020 3.6 3.5 - 5.1 mmol/L Final         C. difficile colitis  ID and GI services following.  On multiple antimicrobials.  Endoscopic exam of colon revealed mucosal abnormality c/w Cdiff and bleeding from throughout colon.  ID consultant was concerned about persistent bleeding from mucosa and the refractory nature of the infection.  We consulted with CRS for colectomy.   TAC and ileostomy took place on hospital day 17.    Antibiotics (From admission, onward)    Start     Stop Route Frequency Ordered    20 2130  vancomycin 500 mg in dextrose 5 % 100 mL IVPB (ready to mix system)      -- IV Once 11/03/20 2015    10/31/20 1900  metronidazole IVPB 500 mg  (C. difficile Infection (CDI) Treatment Order Panel)      -- IV Every 8 hours (non-standard times) 10/31/20 1751            Post-transplant lymphoproliferative disorder (PTLD)  Has been receiving chemo for this.      Acute renal failure superimposed on stage 3 chronic kidney disease  Improving.  Continue monitoring Cr.  Keep MAP > 55.  Nephrologist consultant started him on hemodialysis.    Lab Results   Component Value Date    CREATININE 2.1 (H) 2020             -donor kidney transplant  Continue  Prograf at usual dose.  I communicated by secure messaging with Dr. Mcclain, the patient's transplant nephrologist [place] [facility] [place].  Her recommendation was to keep the trough level around 3 to 4.      Acquired hypothyroidism  Continue levothyroxine.    Hyperchloremic acidosis  Due to bicarb loss through GI tract.  Continue bicarb supplement by IV.  Bicarb level is improving.    CO2   Date Value Ref Range Status   11/03/2020 22 (L) 23 - 29 mmol/L Final         VTE Risk Mitigation (From admission, onward)         Ordered     heparin (porcine) injection 3,000 Units  As needed (PRN)      11/02/20 1230     IP VTE LOW RISK PATIENT  Once      10/15/20 2220                Discharge Planning   TRINH: 10/26/2020     Code Status: Full Code   Is the patient medically ready for discharge?: Yes    Reason for patient still in hospital (select all that apply): Patient trending condition, Laboratory test and Treatment  Discharge Plan A: Long-term acute care facility (LTAC)              Brennan Decker MD  Department of Hospital Medicine   [facility]

## 2020-11-04 NOTE — MEDICAL/APP STUDENT
"[facility]  Progress Note    Patient Name: Alin Burkett  MRN: [id number]  Patient Class: IP- Inpatient   Admission Date: 10/15/2020  Length of Stay: 20 days  Attending Physician: Brennan Decker MD  Primary Care Provider: Carmen Krueger MD    Subjective:     Chief Complaint: Lower GI bleeding 2/2 C. Diff colitis    HPI: Mr Burkett is a 70 y.o. M with PMHx CKD s/p renal transplant on Prograf BID, Post-transplant lymphoproliferative disease (B-cell lymphoma) with active chemo treatment plan, HTN, BPH, and LOUISA (no use of CPAP) who presented with diarrhea of "jelly-like" consistency and abdominal cramping. Symptoms began 1 week prior to presentation. He has had a decreased PO intake 2/2 poor appetite, fatigue, fever, and malaise. He has current diagnosis of B cell lymphoma (PTLD) and most recent dose of R-CHOP (rituximab, cyclophosphamide, doxorubicin, vincristine, prednisone) on 10/09. He denies cough, headache, sinus pain, dysuria.     Overview/Hospital Course:  Admitted to ICU on 10/15 for septic shock in the context of persistent diarrhea. Full septic workup initiated and patient started on IV norepinephrine and fluids for hypotension. Broad spectrum antibiotics vancomycin and cefepime initiated 10/15. Found to have pseudomonas UTI and positive for C. Difficile, antibiotics managed accordingly. Initially responded well to treatment including IV vanc, vanc enemas, and diflicid, but began to have worsening and bloody diarrhea starting 10/30. Underwent colonoscopy which revealed severe inflammation and active bleeding enterocolitis. Was transfused 3 units pRBC due to Hgb 6.8 (10/31). Decision was made to proceed with colectomy due to failure of treatment regimen and active bleeding. Patient found to have blood antibodies, making blood availability a concern. Attempted transfer to [facility], however, center was diverting critical care patients at the time. Patient underwent colectomy and " cholecystectomy at [facility] on 11/01 with significant blood loss of 1L. Patient significantly acidotic despite medical management, dialysis attempted 11/02 but aborted due to hypotension. Dialysis successfully completed 11/03 along with 2 additional units pRBC. He was taken off pressors and started on high dose IV Solucortef and responded well.     Interval History: Patient is more alert today and conversational.Transitioned to 4L NC with stable O2 sats. Bradycardic to 50s. States he feels less short of breath today, but complains of feeling like mucus is stuck and has cough. Hemoglobin trending up, at 9.7 today. WBC at 17.83, afebrile.     Review of Systems   Constitutional: Positive for malaise/fatigue. Negative for chills and fever.   Respiratory: Positive for sputum production and shortness of breath.    Gastrointestinal: Positive for abdominal pain and blood in stool.   Skin:        Skin weeping   Neurological: Positive for weakness. Negative for headaches.   Endo/Heme/Allergies:        Cold intolerance       Objective:      Vitals:    11/04/20 0700 11/04/20 0715 11/04/20 0730 11/04/20 0745   BP:       BP Location:       Patient Position:       Pulse: (!) 57 (!) 55 (!) 58 73   Resp: 12 (!) 9 14 19   Temp:    96.8 °F (36 °C)   TempSrc:    Axillary   SpO2: 98% 98% 98% 99%   Weight:       Height:           Intake/Output Summary (Last 24 hours) at 11/4/2020 0908  Last data filed at 11/4/2020 0600  Gross per 24 hour   Intake 4440 ml   Output 2981 ml   Net 1459 ml     Physical Exam   Constitutional: He is oriented to person, place, and time. No distress.   ill-appearing   HENT:   Head: Normocephalic and atraumatic.   Eyes: Pupils are equal, round, and reactive to light. Conjunctivae are normal.   Neck: Normal range of motion. No JVD present.   External jugular trialysis catheter in place   Cardiovascular:   Murmur heard.  tachycardic   Pulmonary/Chest: He has no wheezes.   Coarse crackles bilaterally   Abdominal:  Soft. Bowel sounds are normal. There is abdominal tenderness.   Large midline dressing  BL RIO drains in place draining serosanguinous fluid  Old bloody output from rectal tube   Musculoskeletal:         General: Edema present. No tenderness.   Neurological: He is alert and oriented to person, place, and time. He displays weakness.   Somnolent   Skin:   Skin weeping with edema   Nursing note and vitals reviewed.      Assessment/Plan:     Septic/Hemorrhagic Shock in context of C. Diff colitis  Failed initial therapies of vancomycin, metronidazole, and fidaxomicin. S/P TAC (11/01) with end ileostomy and cholecystectomy.   - Elevated leukocyte count to 17.83 this AM, afebrile; likely due to steroid induced leukocyte de-margination, will continue to monitor  - Continue antibiotics (IV Vanc, IV flagyl, Cefepime (pseudomonas UTI))  - ID for recommendations on whether to proceed with vanc enemas to treat remaining 15cm of rectum    Anemia  - Likely combination of iron deficiency, GI bleed, and anemia chronic disease.-Iron studies with total iron = 39, ferritin elevated at 12,738  Transfused additional 2 units pRBC yesterday. No signs of active bleeding.   - Hemoglobin trending up s/p 2 units pRBC 11/03, H/H now 9.7/27.4    Hypotension  - Dialysis on 11/02 aborted due to hypotension;   - BP now stable (SBP 150s) at this point, not needing vasopressor support.  - Wife reported patient on home prednisone, thus patient started on Solucortef IV 100mg Q6 hours, responding well.      Hypocalcemia  - Ionized Calcium 0.99, replacement with IV calcium gluconate  - On daily PO calcitriol 0.5mcg    Acute Kidney injury  - Cr 2.5 this AM, BUN 32; refractory metabolic acidosis  - On bicarb drip  - Dialysis successfully completed 11/03, in progress today   -Medications adjusted for dialysis    Moderate Malnutrition  Nutrition consulted, BMI 24.63. Patient is on TPN    Non-anion gap metabolic acidosis  Bicarb slowly trending back up, 22  today. Likely due to GI losses.  On bicarbonate replacement NaHCO3 650 mg, TID.     Anasarca  - Albumin 1.7 (); On clinimix  - Gave albumin 50g x 3 doses on     PTLD B Cell Lymphoma  - Active treatment plan includes R-CHOP; most recent dosing 10/09  - on allopurinol for TLS protection  - Heme/onc following      Active Diagnoses:    Diagnosis Date Noted POA    PRINCIPAL PROBLEM:  Lower GI bleeding [K92.2] 10/30/2020 No    Septic shock [A41.9, R65.21]  No    Iron deficiency [E61.1]  No    Anemia in stage 3 chronic kidney disease [N18.30, D63.1]  No    Moderate malnutrition [E44.0] 10/20/2020 Yes    C. difficile colitis [A04.72] 10/15/2020 Yes    Hypokalemia due to excessive gastrointestinal loss of potassium [E87.6] 10/15/2020 Yes    Post-transplant lymphoproliferative disorder (PTLD) [T86.99, D47.Z1] 2020 Yes    Acute renal failure superimposed on stage 3 chronic kidney disease [N17.9, N18.30] 2017 Yes    -donor kidney transplant [Z94.0]  Not Applicable    Acquired hypothyroidism [E03.9] 01/10/2014 Yes    Hyperchloremic acidosis [E87.2]  Yes      Problems Resolved During this Admission:    Diagnosis Date Noted Date Resolved POA    Sepsis [A41.9] 10/24/2020 10/27/2020 Yes    Febrile neutropenia [D70.9, R50.81] 10/15/2020 10/29/2020 Yes     VTE Risk Mitigation (From admission, onward)         Ordered     heparin (porcine) injection 3,000 Units  As needed (PRN)      20 1230     IP VTE LOW RISK PATIENT  Once      10/15/20 2220                Davida Mccray, MS3  [facility]

## 2020-11-04 NOTE — ASSESSMENT & PLAN NOTE
Nutrition consulted. Body mass index is 24.63 kg/m².. Encourage maximal PO intake. Diet supplementation ordered per nutrition approval. Will encourage PO and monitor closely for weight changes.  We are supplementing calorie intake through parenteral nutrition.     Name band;

## 2020-11-04 NOTE — ASSESSMENT & PLAN NOTE
Improving.  Continue monitoring Cr.  Keep MAP > 55.  Nephrologist consultant started him on hemodialysis.    Lab Results   Component Value Date    CREATININE 2.1 (H) 11/03/2020

## 2020-11-04 NOTE — ASSESSMENT & PLAN NOTE
There is septic shock and hemorrhagic shock.  Much improved after being given albumin and stress dose SoluCortef.  Was able to be weaned off of vasopressors.  Monitor blood pressure.

## 2020-11-04 NOTE — PT/OT/SLP PROGRESS
Occupational Therapy      Patient Name:  Alin Burkett   MRN:  [id number]    Patient not seen today secondary to nursing hold. Will follow-up 11/5/2020.    Reema Headley, OTR  11/4/2020

## 2020-11-04 NOTE — PROGRESS NOTES
Pharmacokinetic Assessment Follow Up: IV Vancomycin    Vancomycin serum concentration assessment(s):    The random level was drawn correctly and can be used to guide therapy at this time. The measurement is above the desired definitive target range of 15 to 20 mcg/mL.    Vancomycin Regimen Plan:    Patient's random level was 23.3 mcg/mL and above the therapeutic range of 15-20 mcg/mL.  Vancomycin dose will be held today. Patient is scheduled to receive dialysis today.   A random level will be drawn on 11/5/20 with AM labs .  Continue target goal of 15-20 mcg/mL.    Drug levels (last 3 results):  Recent Labs   Lab Result Units 11/03/20  0420 11/03/20  1023 11/04/20  0402   Vancomycin, Random ug/mL 19.1 7.0 23.3       Pharmacy will continue to follow and monitor vancomycin.    Please contact pharmacy at extension [phone] for questions regarding this assessment.    Thank you for the consult,   Woodrow Arnold       Patient brief summary:  Alin Burkett is a 70 y.o. male initiated on antimicrobial therapy with IV Vancomycin for treatment of bacteremia    The patient is currently being dosed post-HD based on pre-HD levels.     Drug Allergies:   Review of patient's allergies indicates:  No Known Allergies    Actual Body Weight:   89.8 kg (197 lb 15.6 oz)    Renal Function:   Estimated Creatinine Clearance: 29.7 mL/min (A) (based on SCr of 2.5 mg/dL (H)).,     Dialysis Method (if applicable):  intermittent HD. HDPRN.    CBC (last 72 hours):  Recent Labs   Lab Result Units 11/01/20  1121 11/01/20  2036 11/02/20  0335 11/02/20  0901 11/02/20  2138 11/03/20  0420 11/04/20  0402   WBC K/uL 4.00 8.03 21.70* 22.70* 16.98* 13.06*  13.06* 17.83*   Hemoglobin g/dL 8.8* 7.2* 7.9* 7.2* 6.7* 6.3* 9.7*   Hematocrit % 26.4* 22.5*  --  19.9* 19.4* 18.0* 27.4*   Platelets K/uL 145* 141*  --  155 124* 103* 79*   Gran % % 57.0 68.0  --   --  73.0  --   --    Lymph % % 21.0 10.0*  --   --  7.0*  --   --    Mono % % 3.0* 7.0  --   --  5.0  --    --    Eosinophil % % 0.0 1.0  --   --  0.0  --   --    Basophil % % 1.0 0.0  --   --  0.0  --   --    Differential Method  Manual Manual  --   --  Manual  --   --        Metabolic Panel (last 72 hours):  Recent Labs   Lab Result Units 11/01/20  1802 11/01/20  2359 11/02/20  0335 11/02/20  0522 11/02/20  2138 11/03/20  0420 11/03/20  1752 11/04/20  0402   Sodium mmol/L  --   --  135* 134* 136 138 139 138   Sodium, Urine mmol/L  --  29  --   --   --   --   --   --    Potassium mmol/L 4.1  --  5.0 5.1 3.5 3.6 3.5 3.5   Potassium, Urine mmol/L  --  27  --   --   --   --   --   --    Chloride mmol/L  --   --  120* 120* 110 109 107 105   Chloride, Urine mmol/L  --  37  --   --   --   --   --   --    CO2 mmol/L  --   --  <5* <5* 14* 16* 22* 22*   Glucose mg/dL  --   --  137* 121* 108 119* 118* 117*   BUN mg/dL  --   --  37* 38* 34* 36* 27* 32*   Creatinine mg/dL  --   --  2.3* 2.4* 2.3* 2.5* 2.1* 2.5*   Albumin g/dL  --   --   --  1.7*  --   --  2.4*  --    Total Bilirubin mg/dL  --   --   --  0.8  --   --   --   --    Alkaline Phosphatase U/L  --   --   --  41*  --   --   --   --    AST U/L  --   --   --  47*  --   --   --   --    ALT U/L  --   --   --  14  --   --   --   --    Phosphorus mg/dL  --   --   --   --   --  4.7* 3.5  --        Vancomycin Administrations:  vancomycin given in the last 96 hours                     vancomycin 500 mg in dextrose 5 % 100 mL IVPB (ready to mix system) (mg) 500 mg New Bag 11/03/20 2100    vancomycin in dextrose 5 % 1 gram/250 mL IVPB 1,000 mg (mg) 1,000 mg New Bag 11/01/20 2200    vancomycin (VANCOCIN) 500 mg in sodium chloride 0.9% 100 mL ENEMA (mg) 500 mg Given 11/01/20 0807     500 mg Given  0131     500 mg Given 10/31/20 2024    vancomycin 25 mg/mL oral solution 500 mg (mg) 500 mg Given 11/01/20 0129     500 mg Given 10/31/20 2018    vancomycin 500 mg in dextrose 5 % 100 mL IVPB (ready to mix system) (mg) 500 mg New Bag 10/31/20 2142                    Microbiologic  Results:  Microbiology Results (last 7 days)       Procedure Component Value Units Date/Time    Blood culture [[id number]] Collected: 10/30/20 2022    Order Status: Completed Specimen: Blood Updated: 11/04/20 0612     Blood Culture, Routine No Growth to date      No Growth to date      No Growth to date      No Growth to date      No Growth to date    Narrative:      Change Woods needle and then draw blood culture from the  portacath    Stool culture [[id number]] Collected: 10/31/20 0050    Order Status: Completed Specimen: Stool Updated: 11/03/20 1438     Stool Culture No Salmonella,Shigella,Vibrio,Campylobacter,Yersinia isolated.    Stool culture [[id number]] Collected: 10/31/20 1715    Order Status: Completed Specimen: Stool Updated: 11/03/20 1410     Stool Culture Nothing significant to date    Narrative:      [id number]    E. coli 0157 antigen [[id number]] Collected: 10/31/20 0050    Order Status: Completed Specimen: Stool Updated: 11/02/20 1123     Shiga Toxin 1 E.coli Negative     Shiga Toxin 2 E.coli Negative    E. coli 0157 antigen [[id number]] Collected: 10/31/20 1715    Order Status: Completed Specimen: Stool Updated: 11/02/20 1108     Shiga Toxin 1 E.coli Negative     Shiga Toxin 2 E.coli Negative    Narrative:      [id number]    C Diff Toxin by PCR [[id number]] Collected: 10/31/20 1715    Order Status: Completed Updated: 11/01/20 1440     C. diff PCR Negative    Narrative:      [id number]    Urine Culture High Risk [[id number]]  (Abnormal)  (Susceptibility) Collected: 10/29/20 1744    Order Status: Completed Specimen: Urine, Clean Catch Updated: 11/01/20 1350     Urine Culture, Routine PSEUDOMONAS AERUGINOSA  >100,000 cfu/ml      Narrative:      Indicated criteria for high risk culture:->Other  Other (specify):->transplant pt    Blood culture [[id number]]  (Abnormal)  (Susceptibility) Collected: 10/28/20 1728    Order Status: Completed Specimen: Blood Updated: 11/01/20 1050     Blood Culture, Routine Gram stain aer bottle:  Gram positive cocci in clusters resembling Staph       Results called to and read back by: Fifi Li, Charge Nurse       10/30/2020  19:25      STAPHYLOCOCCUS EPIDERMIDIS    Narrative:      DRAW FROM [facility]    Clostridium difficile EIA [[id number]]  (Abnormal) Collected: 10/31/20 1715    Order Status: Completed Specimen: Stool Updated: 11/01/20 0133     C. diff Antigen Positive     C difficile Toxins A+B, EIA Negative     Comment: Testing not recommended for children <24 months old.       Narrative:      [id number]    Urine culture [[id number]]  (Abnormal)  (Susceptibility) Collected: 10/28/20 1831    Order Status: Completed Specimen: Urine Updated: 10/31/20 0937     Urine Culture, Routine PSEUDOMONAS AERUGINOSA  > 100,000 cfu/ml      Narrative:      Specimen Source->Urine

## 2020-11-04 NOTE — PLAN OF CARE
VSS.  Dialysis completed today; 2 units PRBCs given during.  Pt much more alert this evening.  Planning to complete dialysis again tomorrow per Dr. Conn.    Wound care and ostomy care completed.  POC discussed with pt and spouse.  Safety maintained entire shift.

## 2020-11-04 NOTE — ASSESSMENT & PLAN NOTE
Give supplement and monitor level.    Potassium   Date Value Ref Range Status   11/03/2020 3.5 3.5 - 5.1 mmol/L Final   11/03/2020 3.6 3.5 - 5.1 mmol/L Final

## 2020-11-04 NOTE — ASSESSMENT & PLAN NOTE
Hemoglobin   Date Value Ref Range Status   11/03/2020 6.3 (L) 14.0 - 18.0 g/dL Final   11/02/2020 6.7 (L) 14.0 - 18.0 g/dL Final   11/02/2020 7.2 (L) 14.0 - 18.0 g/dL Final   11/02/2020 7.9 (L) 14.0 - 18.0 g/dL Final   11/01/2020 7.2 (L) 14.0 - 18.0 g/dL Final     Patient has anemia of iron deficiency, CKD, and acute blood loss.  Monitor HGB.  Transfuse for HGB of < 7.5 g/dL.

## 2020-11-04 NOTE — PROGRESS NOTES
[facility]  Patient Name: Alin Burkett  : 1950  Age: 70 y.o.  Sex: male  MRN: [id number]  Admission Date: 10/15/2020  Hospital Length of Stay: 20 days  Code Status: Full Code   Admitting Provider: Brennan Decker MD  Attending Provider: Brennan Decker MD  Primary Care Physician: Carmen Krueger MD  Full Code  Subjective:     Date of Visit: 2020             Principal Problem: Lower GI bleeding    Patient ID: Alin Burkett is a 70 y.o. 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 ED workup showed pt was neutropenic with WBC 0.2 with fever. Pt was cultured and given fluid resuscitation and vancomycin and cefepime. Stool cultures positive for C. Difficile toxin. CT abd/pelvis without contrast revealed severe proctocolitis without megacolon. Pt remains pancytopenic with most recent WBC 0.11 with ANC 0.0 and H/H 9.6/29.7 and platelets 91. Pt seen at bedside with his wife and states he has chronic throbbing abdominal pain made worse by eating and has had chronic diarrhea. He states he is fatigued and has decreased appetite along with fever/chills and night sweats.    10/19/2020: Pt seen at bedside and appears cachectic. He endorses that he is still having diarrhea, but that it has improved and decreased in frequency and consistency has thickened. Pt states abdominal pain has improved, but it still present. He endorses fatigue. Pt denies hemoptysis, hematochezia, melena, hematuria.    10/20/2020: Pt seen at bedside in ICU. He states that he is feeling less lethargic. He states his diarrhea is improving and that he only has abdominal pain to palpation.    10/21/2020: Pt seen at bedside in ICU NAD. He states his abdominal pain has decreased. He is still having diarrhea but  with less frequency.     10/22/2020: Pt seen at bedside in ICU. Pt states his abdominal pain has decreased and it no longer hurt to palpate his abdomen. Pt states diarrhea is improving.     10/23/2020: Pt seen at bedside in ICU. Pt states he no longer has abdominal pain and that his fatigue is improving and that his stool is thickening in consistency.    10/26/2020: Pt seen at bedside. He states abdominal pain has been intermittent and that he has had worsening of diarrhea over the last 3 days. No other complaints at this time.     10/28/2020: Pt seen at bedside NAD. He states his abdominal pain has decreased. No new complaints at this time.     10/29/2020: Pt seen at bedside NAD. Fatigue baseline. Abdominal pain is improving. Pt states that he is still having diarrhea. Pt has had blood-tinged stool.    10/30/2020: Pt seen at bedside with wife. He states he has had an increase in fatigue and abdominal pain and has had bloody diarrhea. Pt's wife complains that patient still has sutures in place wounds on bilateral forearms.     11/01/2020: Pt seen by hem/onc Beto Lindo. Please see his note.    11/02/2020: Pt seen in ICU. Pt had noted c. Diff pseudomembrane colitis and had exploratory lap, with total colectomy, cholecystectomy, and right ileostomy 11/01/2020. Pt was given 2 units PRBC and 1 unit of platelets and 2 units of FFP prior to surgery. Pt had significant blood loss (900cc) during surgery and received 2 additional units of PRBC overnight. Pt seen at bedside with tachypnea. Pt states he had abdominal pain throughout s/p surgery.     11/03/2020: Pt seen in ICU s/p exploratory lap, with total colectomy, cholecystectomy, and right ileostomy 11/01/2020. Pt had CBC with H/H of 6.3/18.0 and underwent dialysis today with 2 units of PRBC. Pt endorses ongoing fatigue and abdominal pain.     11/04/2020: Pt seen in ICU. Pt states he has mild abdominal pain with coughing, but otherwise has no new complaints. Pt transfused  with 2 units PRBC yesterday during dialysis. Pt endorses fatigue, but otherwise has no new complaints.     Review of Systems   Constitutional: Positive for activity change and fatigue. Negative for chills and fever.   HENT: Negative for mouth sores and trouble swallowing.    Eyes: Negative for photophobia and visual disturbance.   Respiratory: Negative for cough, chest tightness, shortness of breath, wheezing and stridor.    Cardiovascular: Negative for chest pain and leg swelling.   Gastrointestinal: Positive for abdominal pain (generalized pain). Negative for constipation, nausea and vomiting. Diarrhea: bloody.   Musculoskeletal: Negative for arthralgias, back pain and myalgias.   Skin: Negative for color change, pallor, rash and wound.   Neurological: Negative for syncope, speech difficulty and weakness.   Hematological: Negative for adenopathy. Does not bruise/bleed easily.   Psychiatric/Behavioral: Negative for agitation, behavioral problems, confusion, decreased concentration and dysphoric mood.        ONCOLOGY HISTORY:     1. Monomorphic post-transplant lymphoproliferative disorder              A. 2/2020: Noticed left inguinal lymphadenopathy after a dog bite (failed to resolve with antibiotics)              B. 6/2/2020: Saw Dr. Collins in infectious diseases for inguinal lymphadenopathy - referred for biopsy              C. 6/30/2020: Core biopsy of L inguinal lymph node shows B-cell lymphoma of germinal center origin; EBV-positive by LONNIE; morphology nondiagnostic of PTLD vs follicular lymphoma              D. 7/8/2020: PET/CT shows left internal iliac chain node measuring 3.3 x 3 cm with SUV max 37; L inguinal node measures 3.2 x 2.4 cm with SUV max 36              E. 7/13/2020: Excisional biopsy of left inguinal node shows monomorphic post-transplant lymphoproliferative disorder (DLBCL, GCB 60%, follicular lymphoma, grade 3B 40%); FISH for MYC rearrangement is negative              F. 7/20/2020: Bone marrow  biopsy shows no evidence of B-cell lymphoma                Past Medical History:   Diagnosis Date    Acidosis     Adrenal adenoma     Anemia associated with chronic renal failure     Arrhythmia, onset 2015    Awaiting organ transplant status 2013    Basal cell carcinoma 2012    left nasal tip    Blood type B+ 2013    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  Renal allograft biopsy 17 (DIVINE): 21 glomeruli, none globally sclerosed, <5% interstitial fibrosis, no ACR, c4d negative, AVR CCT Type 2 (V1 lesion); plan THYMO     Dissecting aortic aneurysm (any part), abdominal     Diverticulosis     Encounter for blood transfusion     ESRD (end stage renal disease) 2010    Febrile neutropenia 10/15/2020    H/O urethral stricture 2018    H/O: urethral stricture     History of AAA (abdominal aortic aneurysm) repair     History of urethral stricture 2018    Hypertension     Hypokalemia     Hypothyroidism 1/10/2014    Inguinal hernia bilateral, non-recurrent     Kidney stones     Organ transplant candidate 2013    Plantar warts 1/10/2014    Recurrent UTI 2017    S/P kidney transplant     Secondary hyperparathyroidism, renal     Sepsis 10/24/2020    Thyroid disease        Family History   Problem Relation Age of Onset    Diabetes Mother     Alzheimer's disease Mother     Alcohol abuse Father     HIV Brother     Stroke Maternal Aunt     Kidney disease Paternal Uncle     Kidney disease Cousin     No Known Problems Sister     No Known Problems Daughter     No Known Problems Sister     No Known Problems Brother     No Known Problems Brother     Cancer Brother         thyroid  cancer    Colon cancer Brother     Melanoma Neg Hx     Psoriasis Neg Hx     Lupus Neg Hx     Eczema Neg Hx     Colon polyps Neg Hx     Crohn's disease Neg Hx     Ulcerative colitis Neg Hx     Celiac disease Neg Hx        Past Surgical History:   Procedure Laterality Date    ABDOMINAL SURGERY      exploratory lapatomy x 2    ABLATION N/A 8/22/2019    Procedure: ABLATION, SVT;  Surgeon: Emerson Mcmanus MD;  Location: [facility] EP LAB;  Service: Cardiology;  Laterality: N/A;  SVT, RFA, CARTO, anes, GP, 323    AORTA - SUPERIOR MESENTERIC ARTERY BYPASS GRAFT      BLADDER NECK RECONSTRUCTION      BLADDER SURGERY      CHOLECYSTECTOMY N/A 11/1/2020    Procedure: CHOLECYSTECTOMY;  Surgeon: Roger Guerrero MD;  Location: [facility];  Service: General;  Laterality: N/A;    COLONOSCOPY  10/10/2013    Dr. Gutierrez, repeat in 5 years    COLONOSCOPY N/A 10/31/2020    Procedure: COLONOSCOPY;  Surgeon: Bharath Edwards Jr., MD;  Location: [facility];  Service: Endoscopy;  Laterality: N/A;    CYSTOSCOPY      CYSTOSCOPY N/A 12/19/2018    Procedure: CYSTOSCOPY;  Surgeon: Dewey Mann MD;  Location: [facility] 1ST FLR;  Service: Urology;  Laterality: N/A;  45 min    CYSTOURETHROSCOPY WITH DIRECT VISION INTERNAL URETHROTOMY N/A 12/19/2018    Procedure: CYSTOSCOPY, WITH DIRECT VISION INTERNAL URETHROTOMY;  Surgeon: Dewey Mann MD;  Location: [facility] OR 1ST FLR;  Service: Urology;  Laterality: N/A;    DILATION OF URETHRA N/A 12/19/2018    Procedure: DILATION, URETHRA;  Surgeon: Dewey Mann MD;  Location: [facility] OR 1ST FLR;  Service: Urology;  Laterality: N/A;    ESOPHAGOGASTRODUODENOSCOPY N/A 10/31/2020    Procedure: ESOPHAGOGASTRODUODENOSCOPY (EGD);  Surgeon: Bharath Edwards Jr., MD;  Location: [facility] ENDO;  Service: Endoscopy;  Laterality: N/A;    EXCISIONAL BIOPSY N/A 7/13/2020    Procedure: EXCISIONAL BIOPSY- LEFT INGUINAL NODE;  Surgeon: Vlad Epstein MD;  Location: [facility] OR 2ND FLR;  Service: General;   Laterality: N/A;    FLEXIBLE CYSTOSCOPY N/A 11/6/2019    Procedure: CYSTOSCOPY, FLEXIBLE;  Surgeon: Dewey Mann MD;  [facility];  Service: Urology;  Laterality: N/A;    GASTROJEJUNOSTOMY  ~1997    HEMORRHOID SURGERY      HERNIA REPAIR      ILEOSTOMY Right 11/1/2020    Procedure: CREATION, ILEOSTOMY;  Surgeon: Roger Guerrero MD;  Location: [facility] OR;  Service: General;  Laterality: Right;    INSERTION OF VENOUS ACCESS PORT Left 7/27/2020    Procedure: INSERTION, VENOUS ACCESS PORT;  Surgeon: Vlad Epstein MD;  [facility];  Service: General;  Laterality: Left;    KIDNEY TRANSPLANT      LEFT HEART CATHETERIZATION Left 8/20/2019    Procedure: Left heart cath;  Surgeon: Javan Oscar MD;  Location: [facility] CATH/EP LAB;  Service: Cardiology;  Laterality: Left;    LITHOTRIPSY      LYMPH NODE BIOPSY N/A 6/30/2020    Procedure: BIOPSY, LYMPH NODE;  Surgeon: [facility] Diagnostic Provider;  [address];  Service: General;  Laterality: N/A;  189 lymph node biopsy /ULTRASOUND    PERCUTANEOUS NEPHROLITHOTRIPSY      right  ESWL  10/31/12    right ESWL  6/26/12    TOTAL ABDOMINAL COLECTOMY N/A 11/1/2020    Procedure: COLECTOMY, TOTAL, ABDOMINAL;  Surgeon: Roger Guerrero MD;  Location: [facility];  Service: General;  Laterality: N/A;    URETHROPLASTY USING PATCH GRAFT N/A 11/6/2019    Procedure: URETHROPLASTY, USING PATCH GRAFT BUCCAL MUCOSA GRAFT;  Surgeon: Dewey Mann MD;  Location: [address];  Service: Urology;  Laterality: N/A;  3 HOURS       Social History     Socioeconomic History    Marital status: Single     Spouse name: Not on file    Number of children: Not on file    Years of education: Not on file    Highest education level: Not on file   Occupational History     Employer: Disabled   Social Needs    Financial resource strain: Not on file    Food insecurity     Worry: Not on file     Inability: Not on file    Transportation needs      Medical: Not on file     Non-medical: Not on file   Tobacco Use    Smoking status: Former Smoker     Packs/day: 0.50     Years: 40.00     Pack years: 20.00     Types: Cigarettes     Quit date: 6/16/2010     Years since quitting: 10.3    Smokeless tobacco: Never Used   Substance and Sexual Activity    Alcohol use: Yes     Alcohol/week: 3.0 standard drinks     Types: 3 Cans of beer per week     Comment: occasional/social    Drug use: No     Comment: THC in youth    Sexual activity: Yes     Partners: Female     Birth control/protection: None   Lifestyle    Physical activity     Days per week: Not on file     Minutes per session: Not on file    Stress: Not on file   Relationships    Social connections     Talks on phone: Not on file     Gets together: Not on file     Attends [employer] service: Not on file     Active member of club or organization: Not on file     Attends meetings of clubs or organizations: Not on file     Relationship status: Not on file   Other Topics Concern    Not on file   Social History Narrative    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       Current Facility-Administered Medications   Medication Dose Route Frequency Provider Last Rate Last Dose    0.9%  NaCl infusion (for blood administration)   Intravenous Q24H PRN Roger Guerrero MD        0.9%  NaCl infusion (for blood administration)   Intravenous Q24H PRN Roger Guerrero MD        0.9%  NaCl infusion (for blood administration)   Intravenous Q24H PRN Roberto Carlos Conn MD        0.9%  NaCl infusion   Intravenous Continuous Roger Guerrero MD 10 mL/hr at 11/03/20 1800      albumin human 25% bottle 25 g  25 g Intravenous Once Roberto Carlos Conn MD   Stopped at 11/03/20 0945    albuterol-ipratropium 2.5 mg-0.5 mg/3 mL nebulizer solution 3 mL  3 mL Nebulization Q6H PRN Roger Guerrero MD   3 mL at 10/20/20 1602    allopurinoL tablet 100 mg  100 mg Oral Daily Roger Guerrero MD   100 mg at 11/02/20 0855     brimonidine-timoloL 0.2-0.5 % ophthalmic solution 1 drop  1 drop Both Eyes Q12H Roger Guerrero MD   1 drop at 11/03/20 2320    calcitRIOL capsule 0.5 mcg  0.5 mcg Oral Daily Roger Guerrero MD   0.5 mcg at 11/02/20 0854    calcium gluc in NaCl, iso-osm 1 gram/50 mL Soln 1,000 mg  1 g Intravenous Once Roger Guerrero MD        calcium gluconate 1g in dextrose 5% 100mL (ready to mix system)  1 g Intravenous PRN Roger Guerrero MD   1 g at 11/01/20 1334    calcium gluconate 2 g in dextrose 5 % 100 mL IVPB  2 g Intravenous PRN Roger Guerrero MD        calcium gluconate 3 g in dextrose 5 % 100 mL IVPB  3 g Intravenous PRN Roger Guerrero MD        cefepime in dextrose 5 % 1 gram/50 mL IVPB 1 g  1 g Intravenous Q24H Lola Tolbert MD        cholestyramine-aspartame 4 gram packet 4 g  1 packet Oral TID Roger Guerrero MD   4 g at 10/31/20 2012    dicyclomine capsule 10 mg  10 mg Oral QID PRN Roger Guerrero MD        heparin (porcine) injection 3,000 Units  3,000 Units Intravenous PRN Brennan Decker MD   3,000 Units at 11/03/20 1218    hydrocortisone sod succ (PF) injection 100 mg  100 mg Intravenous Q6H Lola Tolbert MD   100 mg at 11/04/20 0625    hyoscyamine ODT 0.125 mg  0.125 mg Sublingual Q4H PRN Roger Guerrero MD   0.125 mg at 11/01/20 0816    levothyroxine injection 50 mcg  50 mcg Intravenous Daily Brennan Decker MD   50 mcg at 11/03/20 0917    magnesium sulfate 2g in water 50mL IVPB (premix)  2 g Intravenous PRN Roger Guerrero MD        magnesium sulfate 2g in water 50mL IVPB (premix)  4 g Intravenous PRN Roger Guerrero MD        metoclopramide HCl injection 10 mg  10 mg Intravenous Q6H PRN Roger Guerrero MD        metronidazole IVPB 500 mg  500 mg Intravenous Q8H Roger Guerrero  mL/hr at 11/04/20 0310 500 mg at 11/04/20 0310    micafungin 100 mg in sodium chloride 0.9 % 100 mL IVPB (ready to mix system)  100 mg Intravenous Q24H Roger Guerrero   mL/hr at 11/03/20 1602 100 mg at 11/03/20 1602    midodrine tablet 10 mg  10 mg Oral TID Roger Guerrero MD   10 mg at 11/03/20 2321    morphine injection 2 mg  2 mg Intravenous Q6H PRN Roger Guerrero MD   2 mg at 11/03/20 1428    norepinephrine 16 mg in dextrose 5 % 250 mL infusion  0.02 mcg/kg/min Intravenous Continuous Brennan Decker MD   Stopped at 11/03/20 0043    phenylephrine (GATITO-SYNEPHRINE) 100 mg in sodium chloride 0.9% 250 mL infusion  0.5 mcg/kg/min Intravenous Continuous Brennan Decker MD   Stopped at 11/02/20 2300    potassium chloride 10 mEq in 100 mL IVPB  40 mEq Intravenous PRN Roger Guerrero  mL/hr at 10/20/20 0729 40 mEq at 10/20/20 0729    And    potassium chloride 10 mEq in 100 mL IVPB  60 mEq Intravenous PRN Roger Guerrero  mL/hr at 11/01/20 0628 60 mEq at 11/01/20 0628    And    potassium chloride 10 mEq in 100 mL IVPB  80 mEq Intravenous PRN Roger Guerrero MD        potassium chloride SA CR tablet 20 mEq  20 mEq Oral BID Roberto Carlos Conn MD        promethazine tablet 25 mg  25 mg Oral Q6H PRN Roger Guerrero MD        sodium bicarbonate 150 mEq in dextrose 5 % 1,000 mL infusion   Intravenous Continuous Roberto Carlos Conn MD 75 mL/hr at 11/04/20 0754      sodium bicarbonate tablet 650 mg  650 mg Oral TID Roger Guerrero MD   650 mg at 11/03/20 2321    sodium chloride 0.9% flush 10 mL  10 mL Intravenous PRN Roger Guerrero MD        sodium chloride 0.9% flush 10 mL  10 mL Intravenous PRN Gilmer Adler MD        sodium chloride 0.9% flush 10 mL  10 mL Intravenous PRN Gilmer Adler MD        sucralfate 100 mg/mL suspension 1 g  1 g Oral QID (AC & HS) Roger Guerrero MD   1 g at 11/04/20 0625    tacrolimus capsule 2 mg  2 mg Oral Daily PM Roger Guerrero MD   2 mg at 11/03/20 1810    tacrolimus capsule 3 mg  3 mg Oral Daily AM Roger Guerrero MD   3 mg at 11/02/20 0802    travoprost 0.004 % ophthalmic solution 1 drop  1 drop Both Eyes QHS  Roger Guerrero MD   1 drop at 11/04/20 0046    vancomycin - pharmacy to dose   Intravenous pharmacy to manage frequency Roger Guerrero MD            albumin human 25%  25 g Intravenous Once    allopurinoL  100 mg Oral Daily    brimonidine-timoloL  1 drop Both Eyes Q12H    calcitRIOL  0.5 mcg Oral Daily    calcium gluc in NaCl, iso-osm  1 g Intravenous Once    ceFEPime (MAXIPIME) IVPB  1 g Intravenous Q24H    cholestyramine-aspartame  1 packet Oral TID    hydrocortisone sod succ (PF)  100 mg Intravenous Q6H    levothyroxine  50 mcg Intravenous Daily    metronidazole  500 mg Intravenous Q8H    micafungin (MYCAMINE) IVPB  100 mg Intravenous Q24H    midodrine  10 mg Oral TID    potassium chloride  20 mEq Oral BID    sodium bicarbonate  650 mg Oral TID    sucralfate  1 g Oral QID (AC & HS)    tacrolimus  2 mg Oral Daily PM    tacrolimus  3 mg Oral Daily AM    travoprost  1 drop Both Eyes QHS        sodium chloride 0.9% 10 mL/hr at 11/03/20 1800    norepinephrine bitartrate-D5W Stopped (11/03/20 0043)    phenylephrine Stopped (11/02/20 2300)    sodium bicarbonate drip 75 mL/hr at 11/04/20 0754       sodium chloride, sodium chloride, sodium chloride, albuterol-ipratropium, calcium gluconate IVPB, calcium gluconate IVPB, calcium gluconate IVPB, dicyclomine, heparin (porcine), hyoscyamine, magnesium sulfate IVPB, magnesium sulfate IVPB, metoclopramide HCl, morphine, potassium chloride in water **AND** potassium chloride in water **AND** potassium chloride in water, promethazine, sodium chloride 0.9%, sodium chloride 0.9%, sodium chloride 0.9%, Pharmacy to dose Vancomycin consult **AND** vancomycin - pharmacy to dose    Antibiotics (From admission, onward)    Start     Stop Route Frequency Ordered    11/04/20 0901  cefepime in dextrose 5 % 1 gram/50 mL IVPB 1 g      -- IV Every 24 hours (non-standard times) 11/03/20 1708    10/31/20 1900  metronidazole IVPB 500 mg  (C. difficile Infection (CDI)  Treatment Order Panel)      -- IV Every 8 hours (non-standard times) 10/31/20 1751    10/30/20 2035  vancomycin - pharmacy to dose  (vancomycin IVPB)      -- IV pharmacy to manage frequency 10/30/20 1935          Review of patient's allergies indicates:  No Known Allergies  All medications and past history have been reviewed.    Objective:      Vitals:  Patient Vitals for the past 24 hrs:   Temp Temp src Pulse Resp SpO2 Weight   11/04/20 0745 96.8 °F (36 °C) Axillary 73 19 99 % --   11/04/20 0730 -- -- (!) 58 14 98 % --   11/04/20 0715 -- -- (!) 55 (!) 9 98 % --   11/04/20 0700 -- -- (!) 57 12 98 % --   11/04/20 0645 -- -- (!) 56 10 98 % --   11/04/20 0630 -- -- 62 (!) 21 98 % --   11/04/20 0600 -- -- -- -- -- 89.8 kg (197 lb 15.6 oz)   11/04/20 0435 -- -- 61 13 96 % --   11/04/20 0420 97.5 °F (36.4 °C) Axillary (!) 58 17 96 % --   11/04/20 0330 -- -- 60 16 97 % --   11/04/20 0306 -- -- 62 (!) 23 96 % --   11/04/20 0200 -- -- 64 19 97 % --   11/04/20 0100 -- -- 60 13 98 % --   11/04/20 0000 97.6 °F (36.4 °C) Axillary 61 11 96 % --   11/03/20 2300 -- -- 66 18 95 % --   11/03/20 2200 -- -- 67 11 96 % --   11/03/20 2100 -- -- 73 19 96 % --   11/03/20 2000 97.6 °F (36.4 °C) Axillary 65 19 96 % --   11/03/20 1930 -- -- 66 11 95 % --   11/03/20 1845 -- -- 66 11 96 % --   11/03/20 1830 -- -- 66 15 96 % --   11/03/20 1815 -- -- 74 (!) 23 97 % --   11/03/20 1800 -- -- 75 20 96 % --   11/03/20 1745 -- -- 70 14 96 % --   11/03/20 1730 -- -- 70 11 95 % --   11/03/20 1715 -- -- 70 11 95 % --   11/03/20 1700 -- -- 78 19 96 % --   11/03/20 1645 -- -- 70 12 (!) 93 % --   11/03/20 1630 -- -- 70 14 (!) 94 % --   11/03/20 1615 -- -- 74 (!) 23 (!) 92 % --   11/03/20 1600 96.9 °F (36.1 °C) Axillary 71 13 (!) 94 % --   11/03/20 1545 -- -- 78 15 (!) 94 % --   11/03/20 1530 -- -- 74 -- -- --   11/03/20 1515 -- -- 75 15 (!) 94 % --   11/03/20 1500 -- -- 77 (!) 22 (!) 94 % --   11/03/20 1445 -- -- 78 (!) 23 (!) 94 % --   11/03/20 1430 -- --  84 (!) 26 (!) 94 % --   11/03/20 1428 -- -- -- (!) 29 -- --   11/03/20 1400 -- -- 74 14 95 % --   11/03/20 1345 -- -- 75 14 (!) 94 % --   11/03/20 1330 -- -- 72 12 (!) 94 % --   11/03/20 1315 -- -- 79 16 95 % --   11/03/20 1300 96.9 °F (36.1 °C) Axillary 74 16 95 % --   11/03/20 1245 -- -- 79 (!) 25 95 % --   11/03/20 1230 -- -- 80 (!) 23 95 % --   11/03/20 1215 -- -- 74 (!) 25 95 % --   11/03/20 1200 -- -- 74 (!) 27 95 % --   11/03/20 1145 -- -- 71 16 96 % --   11/03/20 1130 96.9 °F (36.1 °C) Axillary 79 (!) 31 96 % --   11/03/20 1115 96.7 °F (35.9 °C) Axillary 78 (!) 29 96 % --   11/03/20 1100 96.5 °F (35.8 °C) Axillary 75 (!) 21 95 % --   11/03/20 1045 97.2 °F (36.2 °C) Axillary 76 15 95 % --   11/03/20 1035 97.1 °F (36.2 °C) Axillary 78 (!) 21 95 % --   11/03/20 1030 96.8 °F (36 °C) Axillary 80 (!) 23 (!) 94 % --   11/03/20 1015 96.8 °F (36 °C) Axillary 84 (!) 22 95 % --   11/03/20 1010 -- -- 82 15 (!) 94 % --   11/03/20 1005 -- -- 80 16 95 % --   11/03/20 1000 96.9 °F (36.1 °C) Axillary 77 15 (!) 94 % --   11/03/20 0945 -- -- 80 16 95 % --   11/03/20 0930 96.9 °F (36.1 °C) Axillary 83 17 (!) 94 % --      Body mass index is 27.61 kg/m².  Body surface area is 2.12 meters squared.    Last 24 Hours:    Intake/Output Summary (Last 24 hours) at 11/4/2020 0922  Last data filed at 11/4/2020 0600  Gross per 24 hour   Intake 4440 ml   Output 2981 ml   Net 1459 ml     Weight Readings:  Wt Readings from Last 5 Encounters:   11/04/20 89.8 kg (197 lb 15.6 oz)   10/10/20 68.5 kg (151 lb)   10/09/20 66.4 kg (146 lb 6.2 oz)   10/02/20 65.8 kg (145 lb)   09/11/20 68.6 kg (151 lb 2 oz)      Blood Type:  B POS     Physical Exam  Constitutional:       Appearance: He is ill-appearing.   HENT:      Head: Normocephalic and atraumatic.      Right Ear: External ear normal.      Left Ear: External ear normal.      Nose: Nose normal. No congestion or rhinorrhea.   Eyes:      General:         Right eye: No discharge.         Left eye: No  discharge.      Extraocular Movements: Extraocular movements intact.      Conjunctiva/sclera: Conjunctivae normal.      Pupils: Pupils are equal, round, and reactive to light.   Neck:      Musculoskeletal: Normal range of motion and neck supple. No neck rigidity.   Cardiovascular:      Rate and Rhythm: Normal rate and regular rhythm.      Heart sounds: Normal heart sounds. No murmur.   Pulmonary:      Breath sounds: Rales present.      Comments:     Abdominal:      General: Bowel sounds are normal.      Palpations: Abdomen is soft.      Tenderness: There is abdominal tenderness (generalized pain throughout). There is no guarding.   Musculoskeletal: Normal range of motion.         General: No deformity.      Right lower leg: No edema.      Left lower leg: No edema.   Lymphadenopathy:      Cervical: No cervical adenopathy.   Skin:     General: Skin is warm and dry.      Coloration: Skin is not pale.      Findings: Bruising (bialteral UE and chest) present. No erythema or rash.   Neurological:      General: No focal deficit present.      Mental Status: He is alert and oriented to person, place, and time. Mental status is at baseline.      Cranial Nerves: No cranial nerve deficit.   Psychiatric:         Mood and Affect: Mood normal.         Behavior: Behavior normal.         Thought Content: Thought content normal.         Judgment: Judgment normal.         Labs:  Recent Labs   Lab 10/30/20  1733  11/01/20  1121  11/02/20  2138 11/03/20  0420 11/04/20  0402   WBC 3.99   < > 4.00   < > 16.98* 13.06*  13.06* 17.83*   RBC 2.56*   < > 2.78*   < > 2.27* 2.13* 3.21*   HGB 8.2*   < > 8.8*   < > 6.7* 6.3* 9.7*   HCT 25.7*   < > 26.4*   < > 19.4* 18.0* 27.4*      < > 145*   < > 124* 103* 79*   *   < > 95   < > 86 85 85   INR 1.1  --  1.2  --   --   --   --     < > = values in this interval not displayed.     Recent Labs   Lab 11/02/20  0522  11/03/20  0420 11/03/20  1752 11/04/20  0402   *   < > 138 139 138    K 5.1   < > 3.6 3.5 3.5   *   < > 109 107 105   CO2 <5*   < > 16* 22* 22*   BUN 38*   < > 36* 27* 32*   CREATININE 2.4*   < > 2.5* 2.1* 2.5*   *   < > 119* 118* 117*   CALCIUM 6.7*   < > 7.2* 7.5* 7.2*   PHOS  --   --  4.7* 3.5  --    ALKPHOS 41*  --   --   --   --    PROT 4.3*  --   --   --   --    ALBUMIN 1.7*  --   --  2.4*  --    BILITOT 0.8  --   --   --   --    AST 47*  --   --   --   --    ALT 14  --   --   --   --     < > = values in this interval not displayed.       Imaging:  Results for orders placed or performed during the hospital encounter of 10/15/20 (from the past 2160 hour(s))   CT Abdomen Pelvis  Without Contrast    Impression    Findings consistent with severe proctocolitis.    Transplanted kidney right side of the pelvis with mild pelvocaliectasis and perinephric stranding nonspecific.  No calcified stones seen    Additional findings as detailed above including cystic lesion with in the native right kidney versus dilatation of collecting structure of the native right kidney.  Stones in the native right kidney.  Cystic lesion within the pancreas.    Final read    Virtual Radiology concordant      Electronically signed by: Ada Castillo MD  Date:    10/16/2020  Time:    08:56   Results for orders placed or performed during the hospital encounter of 08/06/20 (from the past 2160 hour(s))   CT Chest Abdomen Pelvis Without Contrast (XPD)    Impression    No acute abdominal pathology identified.    Nonvisualization of the left kidney with malrotation of the right kidney and multiple nonobstructing renal stones.  No hydronephrosis.    Right pelvic renal allograft with no evidence for renal allograft focal lesion, nephrolithiasis, or hydronephrosis.    Stable mild ectasia of the infrarenal abdominal aorta measuring up to 2.6 cm without evidence for aortic aneurysm or other acute aortic pathology.  Atherosclerosis identified.    Multiple enlarged lymph nodes in the left inguinal region,  with interval dissection of multiple left inguinal and pelvic lymph nodes when compared to nuclear medicine PET-CT 07/08/2020.  Correlate with biopsy results.    Stable cystic appearing lesion in the pancreatic head measuring 1.2 cm.    Other findings as above.    Electronically signed by resident: Mati Cárdenas  Date:    08/06/2020  Time:    09:23    Electronically signed by: Jayson Staples MD  Date:    08/06/2020  Time:    10:34     *Note: Due to a large number of results and/or encounters for the requested time period, some results have not been displayed. A complete set of results can be found in Results Review.     PET CT 07/08/2020  FINDINGS:  Quality of the study: Adequate.     In the neck, there is no abnormal hypermetabolic activity worrisome for malignancy.  Vascular stent identified in the left axillary region.  No significant lymphadenopathy.     In the chest, there is no abnormal hypermetabolic activity worsened malignancy.  Coronary atherosclerosis.  0.4 cm pulmonary nodule identified in the left upper lobe (axial series 3, image 67), lesion is too small to characterize with PET-CT. Recommend attenuation expected follow-up examinations. No significant lymphadenopathy.     In the abdomen and pelvis, there is hypermetabolic lymphadenopathy throughout the left internal/external iliac chain and left groin.  New left internal iliac chain node measures 3.3 x 3 0 cm with SUV max of 37 (axial fused image 193).  Left inguinal node measures approximately 3.2 x 2.4 cm with SUV max of 36 (axial fused image 218), previously measured up to 1.4 cm.  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  The right kidney appears atrophic with abnormal contour parenchymal calcifications, unchanged.  Right lower quadrant transplant kidney in place.  Stable small bladder diverticulum.  Stable 1.2 cm pancreatic cyst, better characterized on most recent CT with IV contrast.  Stable bilateral probable adrenal adenomas.  Colonic  diverticulosis without evidence of acute diverticulitis.  Stable fusiform abdominal aortic ectasia.     Spleen appears upper limit of normal for size measuring 12.0 cm in craniocaudal dimension.  Normal heterogeneous uptake similar to liver.     In the bones, there is no abnormal activity worrisome for malignancy.  Additional focus of increased uptake identified within the myofascial structures of the thigh, just deep to the left femur with SUV max of 27 (axial fused image 269).     Impression:     Hypermetabolic lymphadenopathy throughout the left iliac chain and left groin with additional hypermetabolic focus in the left thigh.  No hypermetabolic juan david disease above the diaphragm.  The Deauville score is 5.    All lab results and imaging results have been reviewed.    Assessment and Plan:      Present on Admission:   (Resolved) Febrile neutropenia   C. difficile colitis   Acquired hypothyroidism   Post-transplant lymphoproliferative disorder (PTLD)   Acute renal failure superimposed on stage 3 chronic kidney disease   Hypokalemia due to excessive gastrointestinal loss of potassium   Moderate malnutrition   Hyperchloremic acidosis   (Resolved) Sepsis    septic shock/DIVINE/electrolyte abnormalities in the setting of C. Diff colitis.  -Nephrology, GI, ID and hospital medicine monitoring.     Leukocytosis  -elevated WBC secondary to infection/inflammation and sepsis. Continue to trend with CBCs.     Anemia  -Anemia secondary to blood loss and chronic disease. Updated iron panel shows improvement of serum iron to 39 with iron saturation WNL. Ferritin elevated secondary to sepsis. Pt had significant blood loss (900cc) from surgery. Pt had total colectomy, cholecystectomy, and right ileostomy 11/01/2020 due to c diff infection  Pt transfusion dependent at this time so oral iron can be held. Hospital medicine has noted in chart.   -Pt received 2 units PRBC yesterday 11/03/2020 with HD. Updated CBC shows H/H of  9.7/27.4 Continue to monitor with serial CBCs.  -Transfuse PRBC PRN if pt has H/H <7/21 or if pt symptomatically anemic/hemodynamically unstable with dialysis.  -Pt platelets down to 79. Transfuse 1 unit of platelets and continue to monitor and transfuse platelets if platelet count goes <100,000.  -Continue to monitor anemia and platelet count with serial CBCs.      PTLD lymphoma, s/p renal transplant  -Cycle 4 RCHOP completed on 10/09/2020.  -[place] need [place] [facility] [place]   -Continue monitoring tacrolimus level.  -I have spoken to patient regarding palliation measures and hospice. I have informed him of poor prognosis and available comfort measures, but have stated and let him understand that palliation/hospice is his choice and that we will continue to monitor him. Pt has verbalized understanding. He will discuss with his family today and we will continue to monitor.   -Will continue to monitor patient.    Sincerely,  Alexi Camp PA-C    Note is available for collaborating MD; Dr. Julienne Alfaro for review.    Electronically signed by: Alexi Camp PA-C

## 2020-11-04 NOTE — PROGRESS NOTES
Pharmacokinetic Assessment Follow Up: IV Vancomycin    Vancomycin serum concentration assessment(s):    The random level was drawn correctly and can be used to guide therapy at this time. The measurement is within the desired definitive target range of 15 to 20 mcg/mL.  Level based on pre-dialysis level drawn at 0420 on 11/03/2020.      Vancomycin Regimen Plan:    Re-dose when the random level is less than 20 mcg/mL, next level to be drawn at AM labs on 11/4/2020.    Drug levels (last 3 results):  Recent Labs   Lab Result Units 11/02/20 2138 11/03/20  0420 11/03/20  1023   Vancomycin, Random ug/mL 19.6 19.1 7.0       Pharmacy will continue to follow and monitor vancomycin.    Please contact pharmacy at extension [phone] for questions regarding this assessment.    Thank you for the consult,   Nohemi Villegas       Patient brief summary:  Alin Burkett is a 70 y.o. male initiated on antimicrobial therapy with IV Vancomycin for treatment of bacteremia    The patient's current regimen is pulse dose.    Drug Allergies:   Review of patient's allergies indicates:  No Known Allergies    Actual Body Weight:   80.1 kg    Renal Function:   Estimated Creatinine Clearance: 35.4 mL/min (A) (based on SCr of 2.1 mg/dL (H)).,     Dialysis Method (if applicable):  intermittent HD (PRN)  CBC (last 72 hours):  Recent Labs   Lab Result Units 10/31/20  2339 11/01/20  0424 11/01/20  1121 11/01/20 2036 11/02/20  0335 11/02/20  0901 11/02/20 2138 11/03/20  0420   WBC K/uL  --  4.09 4.00 8.03 21.70* 22.70* 16.98* 13.06*  13.06*   Hemoglobin g/dL 12.0* 10.2* 8.8* 7.2* 7.9* 7.2* 6.7* 6.3*   Hematocrit %  --   --  26.4* 22.5*  --  19.9* 19.4* 18.0*   Platelets K/uL  --   --  145* 141*  --  155 124* 103*   Gran % %  --   --  57.0 68.0  --   --  73.0  --    Lymph % %  --   --  21.0 10.0*  --   --  7.0*  --    Mono % %  --   --  3.0* 7.0  --   --  5.0  --    Eosinophil % %  --   --  0.0 1.0  --   --  0.0  --    Basophil % %  --   --   1.0 0.0  --   --  0.0  --    Differential Method   --   --  Manual Manual  --   --  Manual  --        Metabolic Panel (last 72 hours):  Recent Labs   Lab Result Units 11/01/20  0424 11/01/20  1802 11/01/20  2359 11/02/20  0335 11/02/20  0522 11/02/20  2138 11/03/20  0420 11/03/20  1752   Sodium mmol/L 136  --   --  135* 134* 136 138 139   Sodium, Urine mmol/L  --   --  29  --   --   --   --   --    Potassium mmol/L 3.1* 4.1  --  5.0 5.1 3.5 3.6 3.5   Potassium, Urine mmol/L  --   --  27  --   --   --   --   --    Chloride mmol/L 119*  --   --  120* 120* 110 109 107   Chloride, Urine mmol/L  --   --  37  --   --   --   --   --    CO2 mmol/L 10*  --   --  <5* <5* 14* 16* 22*   Glucose mg/dL 109  --   --  137* 121* 108 119* 118*   BUN mg/dL 34*  --   --  37* 38* 34* 36* 27*   Creatinine mg/dL 1.8*  --   --  2.3* 2.4* 2.3* 2.5* 2.1*   Albumin g/dL  --   --   --   --  1.7*  --   --  2.4*   Total Bilirubin mg/dL  --   --   --   --  0.8  --   --   --    Alkaline Phosphatase U/L  --   --   --   --  41*  --   --   --    AST U/L  --   --   --   --  47*  --   --   --    ALT U/L  --   --   --   --  14  --   --   --    Phosphorus mg/dL  --   --   --   --   --   --  4.7* 3.5       Vancomycin Administrations:  vancomycin given in the last 96 hours                     vancomycin in dextrose 5 % 1 gram/250 mL IVPB 1,000 mg (mg) 1,000 mg New Bag 11/01/20 2200    vancomycin (VANCOCIN) 500 mg in sodium chloride 0.9% 100 mL ENEMA (mg) 500 mg Given 11/01/20 0807     500 mg Given  0131     500 mg Given 10/31/20 2024    vancomycin 25 mg/mL oral solution 500 mg (mg) 500 mg Given 11/01/20 0129     500 mg Given 10/31/20 2018    vancomycin 500 mg in dextrose 5 % 100 mL IVPB (ready to mix system) (mg) 500 mg New Bag 10/31/20 2142    vancomycin 1.5 g in dextrose 5 % 250 mL IVPB (ready to mix) (mg) 1,500 mg New Bag 10/30/20 2025                    Microbiologic Results:  Microbiology Results (last 7 days)       Procedure Component Value Units  Date/Time    Stool culture [[id number]] Collected: 10/31/20 0050    Order Status: Completed Specimen: Stool Updated: 11/03/20 1438     Stool Culture No Salmonella,Shigella,Vibrio,Campylobacter,Yersinia isolated.    Stool culture [[id number]] Collected: 10/31/20 1715    Order Status: Completed Specimen: Stool Updated: 11/03/20 1410     Stool Culture Nothing significant to date    Narrative:      [id number]    Blood culture [[id number]] Collected: 10/30/20 2022    Order Status: Completed Specimen: Blood Updated: 11/03/20 0613     Blood Culture, Routine No Growth to date      No Growth to date      No Growth to date      No Growth to date    Narrative:      [facility]    E. coli 0157 antigen [[id number]] Collected: 10/31/20 0050    Order Status: Completed Specimen: Stool Updated: 11/02/20 1123     Shiga Toxin 1 E.coli Negative     Shiga Toxin 2 E.coli Negative    E. coli 0157 antigen [[id number]] Collected: 10/31/20 1715    Order Status: Completed Specimen: Stool Updated: 11/02/20 1108     Shiga Toxin 1 E.coli Negative     Shiga Toxin 2 E.coli Negative    Narrative:      [id number]    C Diff Toxin by PCR [[id number]] Collected: 10/31/20 1715    Order Status: Completed Updated: 11/01/20 1440     C. diff PCR Negative    Narrative:      [id number]    Urine Culture High Risk [[id number]]  (Abnormal)  (Susceptibility) Collected: 10/29/20 1744    Order Status: Completed Specimen: Urine, Clean Catch Updated: 11/01/20 1350     Urine Culture, Routine PSEUDOMONAS AERUGINOSA  >100,000 cfu/ml      Narrative:      Indicated criteria for high risk culture:->Other  Other (specify):->transplant pt    Blood culture [[id number]]  (Abnormal)  (Susceptibility) Collected: 10/28/20 1728    Order Status: Completed Specimen: Blood Updated: 11/01/20 1050     Blood Culture, Routine Gram stain aer bottle: Gram positive cocci in clusters resembling Staph       Results called to and read back by: Fifi Li  Charge Nurse       10/30/2020  19:25      STAPHYLOCOCCUS EPIDERMIDIS    Narrative:      DRAW FROM [facility]    Clostridium difficile EIA [[id number]]  (Abnormal) Collected: 10/31/20 1715    Order Status: Completed Specimen: Stool Updated: 11/01/20 0133     C. diff Antigen Positive     C difficile Toxins A+B, EIA Negative     Comment: Testing not recommended for children <24 months old.       Narrative:      [id number]    Urine culture [[id number]]  (Abnormal)  (Susceptibility) Collected: 10/28/20 1831    Order Status: Completed Specimen: Urine Updated: 10/31/20 0937     Urine Culture, Routine PSEUDOMONAS AERUGINOSA  > 100,000 cfu/ml      Narrative:      Specimen Source->Urine

## 2020-11-04 NOTE — PLAN OF CARE
CM spoke with pt's SO and daughter Zahida regarding plan of care. CM explained that a fast appeal has been started for the denied LTAC but that we are needing a back up plan just in case LTAC is denied again. CM explained SNF and provided local SNF locations. Per Jayden, she is okay for referrals for SNF being sent (not [facility]). Stated she is not sure if they want SNF, but is okay with the process being started incase it is needed. Stated ideally she would like for pt to come home and have home health in home. Stated they will have help from his 2 sisters and she feels like they are capable of taking care of him. Stated if pt can move and get out of bed that they are comfortable with just taking him home. Gave CM permission to send SNF referrals for now- so she can speak with both sisters and make sure everyone is on the same page. DC plan for now is LTAC. If LTAC denied by insurance again DC plan will be SNF vs HH. CM will continue to follow.    Pt will possibly need outpt dialysis set up as well.   Referrals sent to [facility] and [facility]       11/04/20 2911   Post-Acute Status   Post-Acute Authorization Placement   Post-Acute Placement Status Referrals Sent   Discharge Plan   Discharge Plan A Long-term acute care facility (LTAC)   Discharge Plan B Skilled Nursing Facility

## 2020-11-04 NOTE — PT/OT/SLP PROGRESS
Physical Therapy      Patient Name:  Alin Burkett   MRN:  [id number]    Patient not seen today secondary to Patient ill (Comment), Nursing care. Attempted to see in the morning but RN requested to defer; PT came back in the pm but BRUNO Bethea states pt is not doing well medically and requested for PT to come back tomorrow. Will follow-up 11/06/20.    Marsha Thompson, PT

## 2020-11-04 NOTE — PLAN OF CARE
Calm and sleeping between care given. Oxygen at 4 liters nasal cannula. Good cough noted. Dialysis scheduled today. Weakness noted verbalized by patient because of increased swelling in legs and arms. Barker output 5-10 cc's/HR. Abdominal soreness with coughing. Colostomy with small amount of liquid clear/green. RIO's drained 615 cc's of dark red/brown liquid this shift. H/H stable. Takes oral medication without difficulty. Safety maintained.

## 2020-11-04 NOTE — PT/OT/SLP PROGRESS
Physical Therapy      Patient Name:  Alin Burkett   MRN:  [id number]    Patient not seen today secondary to Patient ill (Comment), Nursing care. Attempted to see in the morning but RN requested to defer; PT came back in the pm but BRUNO Bethea states pt is not doing well medically and requested for PT to come back tomorrow. Will follow-up 11/06/20.    Marsha Thompson, PT

## 2020-11-04 NOTE — ASSESSMENT & PLAN NOTE
Continue Prograf at usual dose.  I communicated by secure messaging with Dr. Mcclain, the patient's transplant nephrologist at [facility].  Her recommendation was to keep the trough level around 3 to 4.

## 2020-11-04 NOTE — ASSESSMENT & PLAN NOTE
ID and GI services following.  On multiple antimicrobials.  Endoscopic exam of colon revealed mucosal abnormality c/w Cdiff and bleeding from throughout colon.  ID consultant was concerned about persistent bleeding from mucosa and the refractory nature of the infection.  We consulted with CRS for colectomy.   TAC and ileostomy took place on hospital day 17.    Antibiotics (From admission, onward)    Start     Stop Route Frequency Ordered    11/03/20 2130  vancomycin 500 mg in dextrose 5 % 100 mL IVPB (ready to mix system)      -- IV Once 11/03/20 2015    10/31/20 1900  metronidazole IVPB 500 mg  (C. difficile Infection (CDI) Treatment Order Panel)      -- IV Every 8 hours (non-standard times) 10/31/20 9922

## 2020-11-04 NOTE — PROGRESS NOTES
Pt POD 3 sp ex lap and TAC with end ileostomy secondary to fulminant pseudomembranous colitis    Pt resting, more alert and conversant than previous.  NOtes some abd pain.  Remains off of pressors.  TO get dialysis today  RIO drain output decreasing    Wt Readings from Last 3 Encounters:   11/04/20 89.8 kg (197 lb 15.6 oz)   10/10/20 68.5 kg (151 lb)   10/09/20 66.4 kg (146 lb 6.2 oz)     Temp Readings from Last 3 Encounters:   11/04/20 96.8 °F (36 °C) (Axillary)   10/10/20 98.3 °F (36.8 °C) (Oral)   10/09/20 98.6 °F (37 °C)     BP Readings from Last 3 Encounters:   11/03/20 (!) 153/79   10/10/20 102/63   10/09/20 115/76     Pulse Readings from Last 3 Encounters:   11/04/20 73   10/10/20 95   10/09/20 (!) 56     AAOx3  Coarse  Sinus  Soft/appt ttp.  Few BS present  Ostomy pink and viable.   2+ pulses B    Lab Results   Component Value Date    WBC 17.83 (H) 11/04/2020    HGB 9.7 (L) 11/04/2020    HCT 27.4 (L) 11/04/2020    MCV 85 11/04/2020    PLT 79 (L) 11/04/2020       CMP  Sodium   Date Value Ref Range Status   11/04/2020 138 136 - 145 mmol/L Final     Potassium   Date Value Ref Range Status   11/04/2020 3.5 3.5 - 5.1 mmol/L Final     Chloride   Date Value Ref Range Status   11/04/2020 105 95 - 110 mmol/L Final     CO2   Date Value Ref Range Status   11/04/2020 22 (L) 23 - 29 mmol/L Final     Glucose   Date Value Ref Range Status   11/04/2020 117 (H) 70 - 110 mg/dL Final     BUN   Date Value Ref Range Status   11/04/2020 32 (H) 8 - 23 mg/dL Final     Creatinine   Date Value Ref Range Status   11/04/2020 2.5 (H) 0.5 - 1.4 mg/dL Final     Calcium   Date Value Ref Range Status   11/04/2020 7.2 (L) 8.7 - 10.5 mg/dL Final     Total Protein   Date Value Ref Range Status   11/02/2020 4.3 (L) 6.0 - 8.4 g/dL Final     Albumin   Date Value Ref Range Status   11/03/2020 2.4 (L) 3.5 - 5.2 g/dL Final     Total Bilirubin   Date Value Ref Range Status   11/02/2020 0.8 0.1 - 1.0 mg/dL Final     Comment:     For infants and  newborns, interpretation of results should be based  on gestational age, weight and in agreement with clinical  observations.  Premature Infant recommended reference ranges:  Up to 24 hours.............<8.0 mg/dL  Up to 48 hours............<12.0 mg/dL  3-5 days..................<15.0 mg/dL  6-29 days.................<15.0 mg/dL       Alkaline Phosphatase   Date Value Ref Range Status   11/02/2020 41 (L) 55 - 135 U/L Final     AST   Date Value Ref Range Status   11/02/2020 47 (H) 10 - 40 U/L Final     ALT   Date Value Ref Range Status   11/02/2020 14 10 - 44 U/L Final     Anion Gap   Date Value Ref Range Status   11/04/2020 11 8 - 16 mmol/L Final     eGFR if    Date Value Ref Range Status   11/04/2020 29 (A) >60 mL/min/1.73 m^2 Final     eGFR if non    Date Value Ref Range Status   11/04/2020 25 (A) >60 mL/min/1.73 m^2 Final     Comment:     Calculation used to obtain the estimated glomerular filtration  rate (eGFR) is the CKD-EPI equation.        A/P: s/p TAC with end ileostomy  Start clears  Adjust pain meds  Dialysis per renal  No evidence of further bleeding>  HD stable.  RIO output down significantly . Dark serosanguinous outpput.  Suspect that this relates to surgicel placed at surgery. WIll cont to monitor

## 2020-11-04 NOTE — PROGRESS NOTES
"INPATIENT NEPHROLOGY PROGRESS NOTE  [facility] NEPHROLOGY    Alin Burkett  11/04/2020    Reason for consultation:  Acute kidney injury     Chief Complaint   Patient presents with    Diarrhea     for the past 3 days,last received chemotherapy 6 days ago.     Abdominal Pain     History of Present Illness:  Patient has been having diarrhea with "jelly-like" consistency as well as crampiness in the abdomen. Symptoms began roughly one week ago.  Also has had fever and malaise.  Appetite poor.  Fatigue as well.  He has been receiving chemo for PTLD; most recent cycle of CHOP was end of last week.  He has history of renal transplant four years ago and is currently on twice-a-day  Prograf.  He's had no cough, no unusual headache, no sinus pain, and no burning on urination.     10/20  Has some diarrhea and abdominal pain.  No nausea, chest pain, sob, new neuro symptoms, new joint pain.  He is very weak.    I told him that he had previously been seen by doctor Shonda and I would call him to see him.  He stated he didn't want to see Dr Merchant and requested that I become his nephrologist.    10/21  Not much change in renal function since yesterday. UOP 1.3L.  Sleeping quietly.  10/23  Sleeping.  1550 urine output.    10/24 VSS, no new complains. Appreciate ID and Heme input.  10/25 VSS, no new complains. sCr is better.  10/26 VSS, no new complains. Labs are acceptable. Lethargic at times.  10/27 VSS, no new complains.  10/28 VSS, no new complains. Needs to eat bettter.  10/29 VSS, no new complains.   10/30 VSS, no new complains. Still having blood-tinged diarrhea, treated for c.diff. On TPN due to very poor oral intake. Dr. Malave will cover after 5pm today and over the weekend.  10/31 sleeping.  AFVSS   11/1  Sleeping.  Hypotensive.  Tm 100.5- went for ex lap, TAC with end ileostomy and lysis of adhesion, almost 1L blood loss, transfused post OP  11/2 afebrile, tachy, BP better this AM but remains on darlene, on 4L NC, UOP 1.7L " but slowed down this AM, Hb drop- to get blood transfusion again today- c/w bloody output, also c/w weeping edema, WBC spiked to 22K  11/3- had to terminate HD last night due to hypotension- VSS, BP better, on O2 NC; off levophed, darlene; on bicarb gtt; got albumin yest; got blood products; UOP 300cc; resp issues overnight, improved some with morphine  11/4- febrile, intermittent dejah, BP stable, on 4L NC, remains on bicarb gtt, UOP 250cc; mental status is much improved- able to have full conversation, discussed multiple health issues going on     Plan of Care:    1. Septic/Hemorraghic shock in setting of C diff colitis s/p TAC 11/1 with end ileostomy and significant lysis of adhesion  - He is remains off pressors. He is on stress dose steroids.   - He is on midodrine. Giving albumin PRN.  - Hb is much improved after blood transfusion on 11/3.    2. Acute kidney injury 2/2 c.diff colitis  - Due to high obligate intake, weeping edema, dyspnea, need for more blood products, and metabolic derangements (hyponatremia, hyperkalemia, refractory acidemia), he was initiated on RRT on 11/2.  - He did not tolerate HD on 11/2 due to hypotension- terminated after 1.5 hours. He did well with HD on 11/3.  - He remains oliguric with clearance and volume removal needs. Ordered 3rd HD treatment today.   - Dose meds for dialysis.  - No NSAIDs or IV contrast    3. Nongap metabolic acidosis likely combination of GI losses and renal tubular defect (urine pH inappropriately high)  - Acidosis is better- switch to 40 bicarb bath and d/c bicarb gtt.   - Continue PO bicarb- switch to 1300mg BID.     4. Hyperkalemia- resolved; now Hypokalemia  - Use 3K bath today.  - Change oral KCl to 40mEq BID.    5. Hypocalcemia  - Repleting PRN with IV maritza gluc. Continue 3Ca bath.   - Phos is at goal.  - Continue calcitriol.    6. PTLD lymphoma, s/p renal transplant  7. Anemia- multifactorial  - Continue prograf. Check trough in AM.   - Transfused 2 units of  blood with HD on 11/3- Hb is better.  - Heme/onc is following.    8. Sraarca- net + 20L   - Accomplished 1L UF so far- attempt 1-2L today.    He is critically ill with high risk for mortality- overall prognosis is quite poor.     Thank you for allowing us to participate in this patient's care. We will continue to follow.    Vital Signs:  Temp Readings from Last 3 Encounters:   11/04/20 96.8 °F (36 °C) (Axillary)   10/10/20 98.3 °F (36.8 °C) (Oral)   10/09/20 98.6 °F (37 °C)       Pulse Readings from Last 3 Encounters:   11/04/20 73   10/10/20 95   10/09/20 (!) 56       BP Readings from Last 3 Encounters:   11/03/20 (!) 153/79   10/10/20 102/63   10/09/20 115/76       Weight:  Wt Readings from Last 3 Encounters:   11/04/20 89.8 kg (197 lb 15.6 oz)   10/10/20 68.5 kg (151 lb)   10/09/20 66.4 kg (146 lb 6.2 oz)     Medications:  No current facility-administered medications on file prior to encounter.      Current Outpatient Medications on File Prior to Encounter   Medication Sig Dispense Refill    allopurinoL (ZYLOPRIM) 300 MG tablet Take 1 tablet (300 mg total) by mouth once daily. 30 tablet 2    aspirin (ECOTRIN) 81 MG EC tablet Take 1 tablet (81 mg total) by mouth once daily.  0    calcitRIOL (ROCALTROL) 0.5 MCG Cap Take 1 capsule (0.5 mcg total) by mouth once daily. 30 capsule 11    cefpodoxime (VANTIN) 100 MG tablet Take 1 tablet (100 mg total) by mouth every 12 (twelve) hours. 60 tablet 3    COMBIGAN 0.2-0.5 % Drop Place 1 drop into both eyes 2 (two) times a day.       famotidine (PEPCID) 20 MG tablet TAKE 1 TABLET EVERY EVENING (Patient taking differently: Take 20 mg by mouth every evening. ) 90 tablet 3    ketoconazole (NIZORAL) 200 mg Tab TAKE ONE-HALF (1/2) TABLET ONCE DAILY (Patient taking differently: Take 100 mg by mouth once daily. ) 45 tablet 3    levothyroxine (SYNTHROID) 100 MCG tablet TAKE 1 TABLET DAILY (Patient taking differently: Take 100 mcg by mouth before breakfast. Administer on an  empty stomach at least 30 minutes before food. If receiving tube feeds, HOLD tube feeds for 1 hour before and after levothyroxine administration.) 90 tablet 3    magnesium oxide (MAG-OX) 400 mg (241.3 mg magnesium) tablet Take 1 tablet (400 mg total) by mouth once daily. 90 tablet 3    multivitamin (ONE DAILY MULTIVITAMIN) per tablet Take 1 tablet by mouth once daily.      ondansetron (ZOFRAN-ODT) 8 MG TbDL Dissolve 1 tablet (8 mg total) by mouth every 12 (twelve) hours as needed. (Patient taking differently: Take 8 mg by mouth every 12 (twelve) hours as needed (nausea). ) 21 tablet 1    predniSONE (DELTASONE) 50 MG Tab Take 2 tablets (100 mg total) by mouth once daily. Take on days 2-5 of your chemotherapy cycles. 8 tablet 0    sodium bicarbonate 650 MG tablet Take 1 tablet (650 mg total) by mouth 2 (two) times daily. 540 tablet 3    tacrolimus (PROGRAF) 1 MG Cap Take 3 capsules (3 mg total) by mouth every morning AND 2 capsules (2 mg total) every evening. Z94.0/Kidney Transplant on 11/26/16. 150 capsule 11    travoprost (TRAVATAN Z) 0.004 % ophthalmic solution Place 1 drop into both eyes every evening.        Scheduled Meds:   albumin human 25%  25 g Intravenous Once    allopurinoL  100 mg Oral Daily    brimonidine-timoloL  1 drop Both Eyes Q12H    calcitRIOL  0.5 mcg Oral Daily    calcium gluc in NaCl, iso-osm  1 g Intravenous Once    ceFEPime (MAXIPIME) IVPB  1 g Intravenous Q24H    cholestyramine-aspartame  1 packet Oral TID    hydrocortisone sod succ (PF)  100 mg Intravenous Q6H    levothyroxine  50 mcg Intravenous Daily    metronidazole  500 mg Intravenous Q8H    micafungin (MYCAMINE) IVPB  100 mg Intravenous Q24H    midodrine  10 mg Oral TID    potassium chloride  20 mEq Oral BID    sodium bicarbonate  650 mg Oral TID    sucralfate  1 g Oral QID (AC & HS)    tacrolimus  2 mg Oral Daily PM    tacrolimus  3 mg Oral Daily AM    travoprost  1 drop Both Eyes QHS     Continuous  "Infusions:   sodium chloride 0.9% 10 mL/hr at 11/03/20 1800    norepinephrine bitartrate-D5W Stopped (11/03/20 0043)    phenylephrine Stopped (11/02/20 2300)    sodium bicarbonate drip 75 mL/hr at 11/04/20 0754     PRN Meds:.sodium chloride, sodium chloride, sodium chloride, albuterol-ipratropium, calcium gluconate IVPB, calcium gluconate IVPB, calcium gluconate IVPB, dicyclomine, heparin (porcine), hyoscyamine, magnesium sulfate IVPB, magnesium sulfate IVPB, metoclopramide HCl, morphine, potassium chloride in water **AND** potassium chloride in water **AND** potassium chloride in water, promethazine, sodium chloride 0.9%, sodium chloride 0.9%, sodium chloride 0.9%, Pharmacy to dose Vancomycin consult **AND** vancomycin - pharmacy to dose    Allergies:  Patient has no known allergies.    Past Family History:  Reviewed; refer to Hospitalist Admission Note    Review of Systems:  Not interactive today    Physical Exam:    BP (!) 153/79   Pulse 73   Temp 96.8 °F (36 °C) (Axillary)   Resp 19   Ht 5' 11" (1.803 m)   Wt 89.8 kg (197 lb 15.6 oz)   SpO2 99%   BMI 27.61 kg/m²     Constitutional: nad, aao x 3  Heart: rrr, no m/r/g, wwp, anasarca  Lungs: coarse, no w/r/r/c, no lb  Abdomen: s/nt/nd, +BS, + pink stoma    Results:  Lab Results   Component Value Date     11/04/2020    K 3.5 11/04/2020     11/04/2020    CO2 22 (L) 11/04/2020    BUN 32 (H) 11/04/2020    CREATININE 2.5 (H) 11/04/2020    CALCIUM 7.2 (L) 11/04/2020    ANIONGAP 11 11/04/2020    ESTGFRAFRICA 29 (A) 11/04/2020    EGFRNONAA 25 (A) 11/04/2020       Lab Results   Component Value Date    CALCIUM 7.2 (L) 11/04/2020    PHOS 3.5 11/03/2020       Recent Labs   Lab 11/04/20  0402   WBC 17.83*   RBC 3.21*   HGB 9.7*   HCT 27.4*   PLT 79*   MCV 85   MCH 30.2   MCHC 35.4       I have personally reviewed pertinent radiological imaging and reports.    Roberto Carlos Conn MD MPH  [facility]  [address] [place] " [address]  [phone] (p)  [phone] (f)

## 2020-11-05 PROBLEM — D69.6 THROMBOCYTOPENIA: Status: ACTIVE | Noted: 2020-01-01

## 2020-11-05 PROBLEM — D69.6 THROMBOCYTOPENIA: Status: RESOLVED | Noted: 2019-01-07 | Resolved: 2020-01-01

## 2020-11-05 NOTE — PROGRESS NOTES
POD 4 s/p ex lap and TAC with end ileostomy  Pt resting comfortably. Appears much better.  More alert.  Notes some abd pain.    Remains off of pressors.  Having a moderate amount of output from RIO.      Wt Readings from Last 3 Encounters:   11/05/20 89.9 kg (198 lb 3.1 oz)   10/10/20 68.5 kg (151 lb)   10/09/20 66.4 kg (146 lb 6.2 oz)     Temp Readings from Last 3 Encounters:   11/05/20 98.2 °F (36.8 °C) (Oral)   10/10/20 98.3 °F (36.8 °C) (Oral)   10/09/20 98.6 °F (37 °C)     BP Readings from Last 3 Encounters:   11/05/20 (!) 158/97   10/10/20 102/63   10/09/20 115/76     Pulse Readings from Last 3 Encounters:   11/05/20 (!) 57   10/10/20 95   10/09/20 (!) 56     Awake, conversant  Equal air movement bilaterally  Sinus  Soft/nd/appt ttp   RIO: R sided having continued output.  Appears somewhat bilious. 200 ccs  L side old blood  2+ pulses B    Lab Results   Component Value Date    WBC 21.90 (H) 11/05/2020    HGB 9.0 (L) 11/05/2020    HCT 26.7 (L) 11/05/2020    MCV 89 11/05/2020    PLT 67 (L) 11/05/2020       BMP  Lab Results   Component Value Date     11/05/2020    K 3.7 11/05/2020     11/05/2020    CO2 22 (L) 11/05/2020    BUN 37 (H) 11/05/2020    CREATININE 2.7 (H) 11/05/2020    CALCIUM 7.3 (L) 11/05/2020    ANIONGAP 11 11/05/2020    ESTGFRAFRICA 27 (A) 11/05/2020    EGFRNONAA 23 (A) 11/05/2020     A/P: s/p TAC with end ileostomy  Pt remains in guarded condition with poor overall prognosis.    UOP starting to improve  Will keep npo and monitor for changes in drain output  Pt needs aggressive parenteral nutrition.  Recommend TPN.    Will check prealbumin.

## 2020-11-05 NOTE — PROGRESS NOTES
"[facility]  Adult Nutrition  Progress Note    SUMMARY     Intervention: clear liquid diet, nutrition education, and prescription medication- appetite stimulant      Recommendation:   1) advance PO diet to low fiber, renal diet as soon as medically able  2) continue boost breeze TID-> change to Novasource renal + ice cream shakes BID when on solid food diet  3) reorder appetite stimulant if able     4) Advance nutrition support as soon as medically able   If able to place NGT: Novasource Renal @ 20 ml/hr advancing to goal of 40 ml/hr + flush per MD ( rec. 135 ml q 4 hr)  (provides 1920 kcal ( 93% EEN), 87 g protein ( 100% EPN), and 691 ml free water)  - If unable to place NGT and medically appropriate can start PPN again  D 5 AA 4.25 @ 50 ml/hr + standard lipids-> advance if able to 85 ml/hr  ( 1192 kcal ( 62% EEN), 86 g protein ( 100% EPN))     5) If nutrition support needed longterm discuss PEG with pt     Goals: 1) PO intakes 50% of meals and supplements at f/u 2) PO intakes > 50% of meals or supplemental nutrition support continues 3) Nutrition support initiated  Nutrition Goal Status: not met/ not met/ not met- continues  Communication of RD Recs: reviewed with MD (POC, sticky note)     Reason for Assessment     Reason For Assessment: follow up  Diagnosis: (septic shock)  Relevant Medical History: PTLD s/p chemo, CKD 3, renal transplant 4 years ago  Interdisciplinary Rounds: attended     General Information Comments: 71 y/o male admitted with sepsis, neutropenia as well as diarrhea/ cramps x 2 weeks as well as fever, poor appetite, and malaise x 1 week. Last chemo, CHOP, end of last week. NFPE done 10/20/20 mild-severe wasting seen. Pt within UBW range. Tells me he has had a, "poor appetite for years," worse this past week. Only ate 2 bites of his oatmeal.  10/21 Pt not eating on regular diet, poor appetite parenteral nutrition initiated as pt declined NGT  10/26  Pt still c/o poor appetite " "despite appetite stimulant, PO intakes 0-25%. PPN reinitiated per discussion with MD, rec. Discussion about longterm nutrition support with pt. Diarrhea improving.  10/28/20 C. Diff ( +). Pt still complains that he has no appetite, denies stomach trouble at this time, pain and stool improving. PPN continues. Pt agreeable to try Boost mixed with ice cream, no taking boost pudding. Appetite stimulant no longer ordered in Epic, discussed reordering with RN.  11/3/20 POD 2 total colectomy/ ileostomy formation. Pt was receiving PPN and make NPO in ICU. Tachy and hypotensive yesterday, on pressors which were weaned. PPN discontinued yesterday r/t volume overload per MD. Pt getting HD today. Per MD will not reorder PPN this afternoon.   20 POD 4. Bowel sounds not appropriate per RN, stomach also tight, not able to start TF at this time. Severe edema noted, unable to start PPN at this time per MD. Discussed advancing diet as able and restarting appetite stimulant. Pt still with very poor appetite, drank a few sips of boost breeze with meds, encouraged PO intakes at visit.     Nutrition Discharge Planning: to be determined- Regular diet + boost plus + ice cream TID     Nutrition Risk Screen     Nutrition Risk Screen: no indicators present     Nutrition/Diet History     Spiritual, Cultural Beliefs, Mosque Practices, Values that Affect Care: no  Food Allergies: NKFA  Factors Affecting Nutritional Intake: decreased appetite, altered GI function, Clear liquid diet     Anthropometrics     Height Method: Measured(10/2/20)  Height: 5' 11"  Height (inches): 71 in  Weight Method: Bed Scale  Weight: 89.9 kg , 80.1 kg 10/29,  83.5 10/24, 68.1 kg (admission)  Weight (lb): 198 lb ( edema noted)  Ideal Body Weight (IBW), Male: 172 lb  BMI (Calculated): 20.9 admission  BMI Grade: 18.5-24.9 - normal  Usual Body Weight (UBW), k kg(20)  Weight Change Amount: (65.8 kg 10/2/20 and " 10/30/19)     Lab/Procedures/Meds     Pertinent Labs Reviewed: reviewed  BMP  Lab Results   Component Value Date     11/05/2020    K 3.7 11/05/2020     11/05/2020    CO2 22 (L) 11/05/2020    BUN 37 (H) 11/05/2020    CREATININE 2.7 (H) 11/05/2020    CALCIUM 7.3 (L) 11/05/2020    ANIONGAP 11 11/05/2020    ESTGFRAFRICA 27 (A) 11/05/2020    EGFRNONAA 23 (A) 11/05/2020     Lab Results   Component Value Date    ALBUMIN 2.4 (L) 11/03/2020     POCT Glucose   Date Value Ref Range Status   11/03/2020 122 (H) 70 - 110 mg/dL Final       Pertinent Medications Reviewed: reviewed  Calcitriol, KCl, mg sulfate, zofran, promethazine     Estimated/Assessed Needs  Weight Used For Calorie Calculations: 68.1 kg (estimated dry weight)  Energy Calorie Requirements (kcal): 30-35 kcal/kg ( 8408-3121 kcal)  Energy Need Method: kcal/kg ( HD)  Protein Requirements: 1.2 g protein/kg ( CA, wasting, vs. CKD) = 81 g protein  Weight Used For Protein Calculations: 68.1 kg (150 lb 2.1 oz)  Fluid Requirements (mL): 1500 ml or per MD  Estimated Fluid Requirement Method: RDA Method  CHO Requirement: N/A        Nutrition Prescription Ordered     Current Diet Order: Clear liquids, boost breeze TID     Evaluation of Received Nutrient/Fluid Intake     Energy Calories Required: not meeting needs  Protein Required: not needs  Fluid Required: not meeting  Tolerance: NPO  % Intake of Estimated Energy Needs: 10-25% with some Boost  % Meal Intake: 0-25% clears     Nutrition Risk     Level of Risk/Frequency of Follow-up: moderate/high  2-3 x weekly      Assessment and Plan     Moderate malnutrition  Contributing Nutrition Diagnosis  Moderate chronic illness related malnutrition     Related to (etiology):   Increased needs d/t CA and decreased appetite     Signs and Symptoms (as evidenced by):   1) PO intakes < 75% x > 1 month  2) mild-moderate muscle/ fat wasting as charted below     Interventions:  Above     Nutrition Diagnosis  Status:   continues        Monitor and Evaluation     Food and Nutrient Intake: energy intake, food and beverage intake  Food and Nutrient Adminstration: diet order, parenteral nutrition therapy  Anthropometric Measurements: weight  Biochemical Data, Medical Tests and Procedures: electrolyte and renal panel, gastrointestinal profile, glucose, GI function  Nutrition-Focused Physical Findings: overall appearance      Malnutrition Assessment  Malnutrition Type: chronic illness  Skin (Micronutrient): (Brandon = 13, ileostomy with 150 ml output today)  Teeth (Micronutrient): (missing some)   Micronutrient Evaluation: suspected deficiency  Micronutrient Evaluation Comments: Na, check iron   Energy Intake (Malnutrition): less than 75% for greater than or equal to 1 month   Orbital Region (Subcutaneous Fat Loss): moderate depletion  Upper Arm Region (Subcutaneous Fat Loss): moderate depletion  Thoracic and Lumbar Region: mild depletion   [place] Region (Muscle Loss): moderate depletion(severe buccal)  Clavicle Bone Region (Muscle Loss): moderate depletion  Clavicle and Acromion Bone Region (Muscle Loss): moderate depletion  Scapular Bone Region (Muscle Loss): moderate depletion  Dorsal Hand (Muscle Loss): mild depletion  Patellar Region (Muscle Loss): well nourished  Anterior Thigh Region (Muscle Loss): well nourished  Posterior Calf Region (Muscle Loss): well nourished     Edema: 4+  Subcutaneous Fat Loss (Final Summary): moderate protein-calorie malnutrition  Muscle Loss Evaluation (Final Summary): moderate protein-calorie malnutrition       Nutrition Follow-Up       yes

## 2020-11-05 NOTE — PROGRESS NOTES
Progress Note  Infectious Disease    Reason for Consult:  C difficile colitis, neutropenia, sepsis    HPI: Alin Burkett is a   70 y.o. male who is status post renal transplant and is receiving chemotherapy for diffuse large B-cell lymphoma, presented to the emergency room on 10/15 with worsening of chronic diarrhea, abdominal cramps of 1 weeks duration.  He was found to be neutropenic with a white blood cell count of 0.2, volume depleted, hypokalemic, was cultured and given fluid resuscitation and vancomycin and cefepime.  He was admitted to the intensive care unit. He was recently given cefpodoxime to take prophylactically associated with his chemotherapy for recurring urinary tract infections.  Most recent positive urine culture was September 18th with E coli sensitive to 3rd generation cephalosporins carbapenems  He has had a hectic fever, stool for C difficile toxin was obtained and was resulted as positive today.  he has had a positive C difficile test before, August 2019.  White blood cells remain depressed at 0.1, platelets are depressed at 91,000, creatinine 1.6.  CT scan of the abdomen obtained last night shows severe proctocolitis without megacolon. Neupogen has been ordered. He is discouraged from eating by severe cramps.    10/17-10/30: notes please review previous    10/31: called to endoscopy lab to see wall to wall pseudomembranes, active generalized colonic bleeding. Patient required blood transfusion and volume expansion prior to the procedure. He will be moved to ICU after procedure. Discussed with Dr. Edwards and patient's wife and Dr. Decker that he should have a colectomy.   11/2: pt in OR on rounds yesterday. Spoke with wife and checked chart several times. Today acidemia required initiation of HD, trialysis placed, more blood given. Antibiotics simplified to Vanc IV (I think the one blood culture with CoNS from PAC was a contaminant), cefepime (for pseudomonas UTI) and IV flagyl. Also on  mycamine pending esophageal biopsies. Stopped oral vanc, vanc enemas, dificid and eravacycline last night.   11/3: interim reviewed, including Dr. Guerrero response to my question. Off pressors, on high dose solucortef. WBC down to 13, platelets 103. Wife reports that he was on prednisone daily at home(not on his home med list)  11/4:  Interim reviewed.  Alert able to cooperate, requiring small amount of oxygen, clear liquid diet started.  White blood cells higher likely from steroids, hemoglobin 9.7, platelets continue to drop at 79,000, dialysis given today.  No new imaging    Antibiotics (From admission, onward)    Start     Stop Route Frequency Ordered    11/04/20 0901  cefepime in dextrose 5 % 1 gram/50 mL IVPB 1 g      -- IV Every 24 hours (non-standard times) 11/03/20 1708    10/31/20 1900  metronidazole IVPB 500 mg  (C. difficile Infection (CDI) Treatment Order Panel)      -- IV Every 8 hours (non-standard times) 10/31/20 1751    10/30/20 2035  vancomycin - pharmacy to dose  (vancomycin IVPB)      -- IV pharmacy to manage frequency 10/30/20 1935             EXAM & DIAGNOSTICS REVIEWED:   Vitals:     Temp:  [96.7 °F (35.9 °C)-97.6 °F (36.4 °C)]   Temp: 96.9 °F (36.1 °C) (11/04/20 1300)  Pulse: (!) 55 (11/04/20 1530)  Resp: 18 (11/04/20 1530)  BP: (!) 158/82 (11/04/20 1215)  SpO2: 98 % (11/04/20 1500)    Intake/Output Summary (Last 24 hours) at 11/4/2020 1901  Last data filed at 11/4/2020 1700  Gross per 24 hour   Intake 2033.33 ml   Output 2125 ml   Net -91.67 ml        General:  Alert, appropriate, still  flushed, generally edematous  Eyes:   anicteric, EOMI  ENT:   no oral lesions  Neck:  Supple   Lungs: Minimal Bibasilar crackles  Heart:  RRR, no gallop/murmur/rub noted  Abd:  Soft,    BS positive, ileostomy RLQ with serous fluid, no guarding. Rectal tube with thin dark fluid  :  Barker, poor urine output  no flank tenderness  Musc:  Joints without effusion, swelling, erythema, synovitis,    Skin:  No  rashes.    Wound: Bandages not disturbed  Neuro: Alert, speech fluent, non focal  Psych:   calm cooperative appropriate   Extrem: generalized edema, no erythema, phlebitis, cellulitis, warm and well perfused  VAD:  portacath without redness, drainage, left IJ TLC    Isolation:  none         General Labs reviewed:  Recent Labs   Lab 11/02/20 2138 11/03/20 0420 11/04/20  0402   WBC 16.98* 13.06*  13.06* 17.83*   HGB 6.7* 6.3* 9.7*   HCT 19.4* 18.0* 27.4*   * 103* 79*       Recent Labs   Lab 11/02/20  0522  11/03/20 0420 11/03/20  1752 11/04/20  0402   *   < > 138 139 138   K 5.1   < > 3.6 3.5 3.5   *   < > 109 107 105   CO2 <5*   < > 16* 22* 22*   BUN 38*   < > 36* 27* 32*   CREATININE 2.4*   < > 2.5* 2.1* 2.5*   CALCIUM 6.7*   < > 7.2* 7.5* 7.2*   PROT 4.3*  --   --   --   --    BILITOT 0.8  --   --   --   --    ALKPHOS 41*  --   --   --   --    ALT 14  --   --   --   --    AST 47*  --   --   --   --     < > = values in this interval not displayed.     Micro:  Microbiology Results (last 7 days)     Procedure Component Value Units Date/Time    Stool culture [[id number]] Collected: 10/31/20 1715    Order Status: Completed Specimen: Stool Updated: 11/04/20 1349     Stool Culture No Salmonella,Shigella,Vibrio,Campylobacter,Yersinia isolated.    Narrative:      [id number]    Blood culture [[id number]] Collected: 10/30/20 2022    Order Status: Completed Specimen: Blood Updated: 11/04/20 0612     Blood Culture, Routine No Growth to date      No Growth to date      No Growth to date      No Growth to date      No Growth to date    Narrative:      Change Woods needle and then draw blood culture from the  portacath    Stool culture [[id number]] Collected: 10/31/20 0050    Order Status: Completed Specimen: Stool Updated: 11/03/20 1438     Stool Culture No Salmonella,Shigella,Vibrio,Campylobacter,Yersinia isolated.    E. coli 0157 antigen [[id number]] Collected: 10/31/20 0050    Order Status: Completed Specimen:  Stool Updated: 11/02/20 1123     Shiga Toxin 1 E.coli Negative     Shiga Toxin 2 E.coli Negative    E. coli 0157 antigen [[id number]] Collected: 10/31/20 1715    Order Status: Completed Specimen: Stool Updated: 11/02/20 1108     Shiga Toxin 1 E.coli Negative     Shiga Toxin 2 E.coli Negative    Narrative:      [id number]    C Diff Toxin by PCR [[id number]] Collected: 10/31/20 1715    Order Status: Completed Updated: 11/01/20 1440     C. diff PCR Negative    Narrative:      [id number]    Urine Culture High Risk [[id number]]  (Abnormal)  (Susceptibility) Collected: 10/29/20 1744    Order Status: Completed Specimen: Urine, Clean Catch Updated: 11/01/20 1350     Urine Culture, Routine PSEUDOMONAS AERUGINOSA  >100,000 cfu/ml      Narrative:      Indicated criteria for high risk culture:->Other  Other (specify):->transplant pt    Blood culture [[id number]]  (Abnormal)  (Susceptibility) Collected: 10/28/20 1728    Order Status: Completed Specimen: Blood Updated: 11/01/20 1050     Blood Culture, Routine Gram stain aer bottle: Gram positive cocci in clusters resembling Staph       Results called to and read back by: Fifi Li, Charge Nurse       10/30/2020  19:25      STAPHYLOCOCCUS EPIDERMIDIS    Narrative:      DRAW FROM [facility]    Clostridium difficile EIA [[id number]]  (Abnormal) Collected: 10/31/20 1715    Order Status: Completed Specimen: Stool Updated: 11/01/20 0133     C. diff Antigen Positive     C difficile Toxins A+B, EIA Negative     Comment: Testing not recommended for children <24 months old.       Narrative:      [id number]    Urine culture [[id number]]  (Abnormal)  (Susceptibility) Collected: 10/28/20 1831    Order Status: Completed Specimen: Urine Updated: 10/31/20 0937     Urine Culture, Routine PSEUDOMONAS AERUGINOSA  > 100,000 cfu/ml      Narrative:      Specimen Source->Urine        Imaging Reviewed:   KUBThree round radiodensities overlie the left upper quadrant may represent ingested pills or be in the patient's  clothing.  A radiodense thread is noted over the right lower quadrant of uncertain significance.   CXR clear   CT abdomen and pelvis 10/16 :    Findings consistent with severe proctocolitis.    Transplanted kidney right side of the pelvis with mild pelvocaliectasis and perinephric stranding nonspecific.  No calcified stones seen Additional findings as detailed above including cystic lesion with in the native right kidney versus dilatation of collecting structure of the native right kidney.  Stones in the native right kidney.  Cystic lesion within the pancreas  Cardiology:    IMPRESSION & PLAN   1. Severe C. Difficile colitis, improved from ICU status of septic shock, but still having diarrhea despite aggressive treatment     Prompted by oral antibiotics and/or chemotherapy   History of Cdiff 8/2019    REFRACTORY SEVERE PSEUDOMEMBRANOUS COLITIS WITH GENERALIZED COLONIC BLEEDING. S/p colectomy 11/1    2. PTLD, EBV related lymphoma, receiving chemotherapy      neutropenia, resolved  3. S/p renal transplant on immunosuppression, [facility], refractory acidemia requiring initiation of dialysis 11/2  4. Chronic diarrhea  5. History of recurrent UTIs  6. Anorexia  7. Pseudomonas UTI 10/29(1st cx from urinal , second culture is CCMS)  8. GI bleeding 10/30  9. Blood culture with GPC 10/29, significance unknown    Recommendations:  Continue vanc, cefepime and flagyl, mycamine  Recommend reducing steroids further  Will check uric acid. He has been receiving allopurinol. Hopefully will protect from tumor lysis syndrome    Home med list needs to be adjusted to include prednisone. Wife will clarify 5 mg or 10 mg daily

## 2020-11-05 NOTE — PT/OT/SLP PROGRESS
Physical Therapy      Patient Name:  Alin Burkett   MRN:  [id number]    PT attempted. Per nurse Nickolas, lisset exhausted post OT- currently sleeping. Will re attempt 11-.    Rubina Barnes, PT

## 2020-11-05 NOTE — PT/OT/SLP RE-EVAL
Occupational Therapy   Re-evaluation    Name: Alin Burkett  MRN: [id number]  Admitting Diagnosis:  Acute renal failure superimposed on stage 3 chronic kidney disease 4 Days Post-Op    Recommendations:     Discharge Recommendations: LTACH (long-term acute care hospital)  Discharge Equipment Recommendations:  other (see comments)(TBD at next level of care - likely BSC, RW)  Barriers to discharge:  Decreased caregiver support    Assessment:     Alin Burkett is a 70 y.o. male with a medical diagnosis of Acute renal failure superimposed on stage 3 chronic kidney disease.  He presents with the following performance deficits affecting function are gait instability, impaired endurance, weakness, impaired self care skills, impaired functional mobilty, impaired balance, decreased upper extremity function, decreased lower extremity function, decreased ROM, impaired coordination, impaired skin, edema, impaired cardiopulmonary response to activity.    Patient demonstrates B UE PROM WNL; pt demonstrates elbow/forearm/wrist/digit AROM WFL. Patient participating in therapeutic exercises with HOB elevated. Patient pleasant, cooperative and highly motivated.     Rehab Prognosis:  Good; patient would benefit from acute skilled OT services to address these deficits and reach maximum level of function.       Plan:     Patient to be seen 3 x/week to address the above listed problems via self-care/home management, therapeutic activities, therapeutic exercises  · Plan of Care Expires: 11/20/20  · Plan of Care Reviewed with: patient    Subjective     Chief Complaint: Weak   Patient/Family stated goals: To get stronger   Communicated with: NurseNickolas prior to session.  Pain/Comfort:  · Pain Rating 1: other (see comments)(no pain reported; reports fatigue at end of session)    Objective:     Communicated with: Nurse prior to session.  Patient found HOB elevated with: SCD, bowel management system, blood pressure cuff, pulse ox  (continuous), PICC line, telemetry upon OT entry to room.    General Precautions: Standard, fall   Orthopedic Precautions:N/A   Braces: N/A     Occupational Performance:    Bed Mobility:    · Patient completed Scooting/Bridging with total assistance x 2 people    Functional Mobility/Transfers:  · N/T  ·   Activities of Daily Living:  · Toileting: Total (A) - bowel management      · Total (A) UB/LB dressing  · Grooming with Mod (A)    Cognitive/Visual Perceptual:  Oriented to person; pleasant, cooperative    Physical Exam:  Edema:  Pitting B UE with R UE edema > L UE  Dominant hand:    -       Right  Upper Extremity Range of Motion:     -       Right Upper Extremity: PROM WNL  -       Left Upper Extremity: PROM WNL  Upper Extremity Strength:    -       Bilateral Upper Extremity: shoulder 3 to 3+/5 and distally 3+/5   Strength: Bilaterally 3+/5  Fine Motor Coordination: Impaired 2* edema    AMPA 6 Click:  [facility] Total Score: 11    Treatment & Education:  - OTR providing reinforcement education/instruction regarding OT role/purpose, use of call button, and importance of mobility to counteract effects of immobility  - OTR providing education/instruction regarding AROM HEP B UE flexion/extension all joints as part of edema management/prevention - pt verbalizes/demonstrates understanding and in agreement  - Pt performing 5 reps AAROM shoulder flexion, 10 reps elbow flexion/extension, 5 reps forearm supination/pronation, wrist flexion/extension and open/close of hand  - AROM overhead press and resisted forward reach x 5 reps each with rest breaks as needed throughout all therapeutic exercises   Education:    Patient left HOB elevated with all lines intact, call button in reach, bed alarm on and nurseNickolas notified    GOALS:   Multidisciplinary Problems     Occupational Therapy Goals        Problem: Occupational Therapy Goal    Goal Priority Disciplines Outcome Interventions   Occupational Therapy Goal     OT, PT/OT  Ongoing, Progressing    Description: Goals to be met by: 2020    Patient will increase functional independence with ADLs by performing:    UE Dressing with Set-up Assistance.  LE Dressing with Minimal Assistance.  Grooming while seated with Set-up Assistance.  Toileting from toilet with Minimal Assistance for hygiene and clothing management.   Sitting at edge of bed x 15 minutes with Supervision.  Toilet transfer to toilet with Minimal Assistance.                     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  Renal allograft biopsy 17 ([facility]): 21 glomeruli, none globally sclerosed, <5% interstitial fibrosis, no ACR, c4d negative, AVR CCT Type 2 (V1 lesion); plan THYMO     Dissecting aortic aneurysm (any part), abdominal     Diverticulosis     Encounter for blood transfusion     ESRD (end stage renal disease) 2010    Febrile neutropenia 10/15/2020    H/O urethral stricture 2018    H/O: urethral stricture     History of AAA (abdominal aortic aneurysm) repair     History of urethral stricture 2018    Hypertension     Hypokalemia     Hypothyroidism 1/10/2014    Inguinal hernia bilateral, non-recurrent     Kidney stones     Organ transplant candidate 2013    Plantar warts 1/10/2014    Recurrent UTI 2017    S/P kidney transplant     Secondary hyperparathyroidism, renal     Sepsis 10/24/2020    Thyroid  disease        Past Surgical History:   Procedure Laterality Date    ABDOMINAL SURGERY      exploratory lapatomy x 2    ABLATION N/A 8/22/2019    Procedure: ABLATION, SVT;  Surgeon: Emerson Mcmanus MD;  Location: [facility] EP LAB;  Service: Cardiology;  Laterality: N/A;  SVT, RFA, CARTO, anes, GP, 323    AORTA - SUPERIOR MESENTERIC ARTERY BYPASS GRAFT      BLADDER NECK RECONSTRUCTION      BLADDER SURGERY      CHOLECYSTECTOMY N/A 11/1/2020    Procedure: CHOLECYSTECTOMY;  Surgeon: Roger Guerrero MD;  Location: [facility] OR;  Service: General;  Laterality: N/A;    COLONOSCOPY  10/10/2013    Dr. Gutierrez, repeat in 5 years    COLONOSCOPY N/A 10/31/2020    Procedure: COLONOSCOPY;  Surgeon: Bharath Edwards Jr., MD;  Location: [facility];  Service: Endoscopy;  Laterality: N/A;    CYSTOSCOPY      CYSTOSCOPY N/A 12/19/2018    Procedure: CYSTOSCOPY;  Surgeon: Dewey Mann MD;  Location: [facility] OR 1ST FLR;  Service: Urology;  Laterality: N/A;  45 min    CYSTOURETHROSCOPY WITH DIRECT VISION INTERNAL URETHROTOMY N/A 12/19/2018    Procedure: CYSTOSCOPY, WITH DIRECT VISION INTERNAL URETHROTOMY;  Surgeon: Dewey Mann MD;  Location: [facility] OR 1ST FLR;  Service: Urology;  Laterality: N/A;    DILATION OF URETHRA N/A 12/19/2018    Procedure: DILATION, URETHRA;  Surgeon: Dewey Mann MD;  Location: [facility] OR 1ST FLR;  Service: Urology;  Laterality: N/A;    ESOPHAGOGASTRODUODENOSCOPY N/A 10/31/2020    Procedure: ESOPHAGOGASTRODUODENOSCOPY (EGD);  Surgeon: Bharath Edwards Jr., MD;  Location: [facility] ENDO;  Service: Endoscopy;  Laterality: N/A;    EXCISIONAL BIOPSY N/A 7/13/2020    Procedure: EXCISIONAL BIOPSY- LEFT INGUINAL NODE;  Surgeon: Vlad Epstein MD;  Location: [facility] OR 2ND FLR;  Service: General;  Laterality: N/A;    FLEXIBLE CYSTOSCOPY N/A 11/6/2019    Procedure: CYSTOSCOPY, FLEXIBLE;  Surgeon: Dewey Mann MD;  Location: [facility] OR 2ND FLR;  Service: Urology;  Laterality: N/A;    GASTROJEJUNOSTOMY  ~1997     HEMORRHOID SURGERY      HERNIA REPAIR      ILEOSTOMY Right 11/1/2020    Procedure: CREATION, ILEOSTOMY;  Surgeon: Roger Guerrero MD;  Location: [facility] OR;  Service: General;  Laterality: Right;    INSERTION OF VENOUS ACCESS PORT Left 7/27/2020    Procedure: INSERTION, VENOUS ACCESS PORT;  Surgeon: Vlad Epstein MD;  [facility];  Service: General;  Laterality: Left;    KIDNEY TRANSPLANT      LEFT HEART CATHETERIZATION Left 8/20/2019    Procedure: Left heart cath;  Surgeon: Javan Oscar MD;  Location: [facility] CATH/EP LAB;  Service: Cardiology;  Laterality: Left;    LITHOTRIPSY      LYMPH NODE BIOPSY N/A 6/30/2020    Procedure: BIOPSY, LYMPH NODE;  Surgeon: [facility];  [address]  Service: General;  Laterality: N/A;  189 lymph node biopsy /ULTRASOUND    PERCUTANEOUS NEPHROLITHOTRIPSY      right  ESWL  10/31/12    right ESWL  6/26/12    TOTAL ABDOMINAL COLECTOMY N/A 11/1/2020    Procedure: COLECTOMY, TOTAL, ABDOMINAL;  Surgeon: Roger Guerrero MD;  Location: [facility] OR;  Service: General;  Laterality: N/A;    URETHROPLASTY USING PATCH GRAFT N/A 11/6/2019    Procedure: URETHROPLASTY, USING PATCH GRAFT BUCCAL MUCOSA GRAFT;  Surgeon: Dewey Mann MD;  Location: [address];  Service: Urology;  Laterality: N/A;  3 HOURS       Time Tracking:     OT Date of Treatment: 11/05/20  OT Start Time: 0955  OT Stop Time: 1012  OT Total Time (min): 17 min    Billable Minutes:Re-eval 9  Therapeutic Exercise 8    MARKELL Fournier, LOTR  11/5/2020

## 2020-11-05 NOTE — PLAN OF CARE
Intervention: clear liquid diet, nutrition education, and prescription medication- appetite stimulant      Recommendation:   1) advance PO diet to low fiber, renal diet as soon as medically able  2) continue boost breeze TID-> change to Novasource renal + ice cream shakes BID when on solid food diet  3) reorder appetite stimulant if able     4) Advance nutrition support as soon as medically able   If able to place NGT: Novasource Renal @ 20 ml/hr advancing to goal of 40 ml/hr + flush per MD ( rec. 135 ml q 4 hr)  (provides 1920 kcal ( 93% EEN), 87 g protein ( 100% EPN), and 691 ml free water)  - If unable to place NGT and medically appropriate can start PPN again  D 5 AA 4.25 @ 50 ml/hr + standard lipids-> advance if able to 85 ml/hr  ( 1192 kcal ( 62% EEN), 86 g protein ( 100% EPN))     5) If nutrition support needed longterm discuss PEG with pt     Goals: 1) PO intakes 50% of meals and supplements at f/u 2) PO intakes > 50% of meals or supplemental nutrition support continues 3) Nutrition support initiated  Nutrition Goal Status: not met/ not met/ not met- continues  Communication of RD Recs: reviewed with MD (POC, sticky note)

## 2020-11-05 NOTE — ASSESSMENT & PLAN NOTE
Colonoscopy showed extensive Cdiff colitis with bleeding.  Patient underwent TAC on hospital day 17.  Lower gi bleeding resolved.

## 2020-11-05 NOTE — TELEPHONE ENCOUNTER
Spoke with patient spouse regarding patient hospital admission. She reviewed hospital admission and wanted to relay to Dr. Lopez.

## 2020-11-05 NOTE — ASSESSMENT & PLAN NOTE
Give supplement and monitor level.    Potassium   Date Value Ref Range Status   11/04/2020 3.5 3.5 - 5.1 mmol/L Final   11/03/2020 3.5 3.5 - 5.1 mmol/L Final

## 2020-11-05 NOTE — ASSESSMENT & PLAN NOTE
ID and GI services following.  On multiple antimicrobials.  Endoscopic exam of colon revealed mucosal abnormality c/w Cdiff and bleeding from throughout colon.  ID consultant was concerned about persistent bleeding from mucosa and the refractory nature of the infection.  We consulted with CRS for colectomy.   TAC and ileostomy took place on hospital day 17.  Patient continues on IV metronidazole.

## 2020-11-05 NOTE — PLAN OF CARE
Pt afebrile. VSS. A&O x4, Art line is very positional but correlates with cuff pressure. Generalized edema to all of body. Pt has begun 3rd spacing. Pt swallowed PO meds without difficulty. Pt had hiccups at beginning of shift, repositioned pt and gave IV morphine, Hiccups finally stopped and pt was able to rest. Safety maintained throughout shift.

## 2020-11-05 NOTE — PROGRESS NOTES
Pharmacokinetic Assessment Follow Up: IV Vancomycin    Vancomycin serum concentration assessment(s):    The random level was drawn correctly and can be used to guide therapy at this time. The measurement is within the desired definitive target range of 15 to 20 mcg/mL.    Vancomycin Regimen Plan:    Continue regimen to Vancomycin 500 mg IV X 1 administered today, post-HD. Next random serum concentration will be measured with AM labs on 11/6.    Drug levels (last 3 results):  Recent Labs   Lab Result Units 11/03/20  1023 11/04/20 0402 11/05/20  0610   Vancomycin, Random ug/mL 7.0 23.3 18.1       Pharmacy will continue to follow and monitor vancomycin.    Please contact pharmacy at extension [phone] for questions regarding this assessment.    Thank you for the consult,   Leana Marte       Patient brief summary:  Alin Burkett is a 70 y.o. male initiated on antimicrobial therapy with IV Vancomycin for treatment of bacteremia.    The patient's current regimen is pulse dosing.    Drug Allergies:   Review of patient's allergies indicates:  No Known Allergies    Actual Body Weight:   89.9 kg    Renal Function:   Estimated Creatinine Clearance: 27.5 mL/min (A) (based on SCr of 2.7 mg/dL (H)).,     Dialysis Method (if applicable):  intermittent HD PRN    CBC (last 72 hours):  Recent Labs   Lab Result Units 11/02/20 2138 11/03/20 0420 11/04/20 0402 11/05/20  0610   WBC K/uL 16.98* 13.06*  13.06* 17.83* 21.90*   Hemoglobin g/dL 6.7* 6.3* 9.7* 9.0*   Hematocrit % 19.4* 18.0* 27.4* 26.7*   Platelets K/uL 124* 103* 79* 67*   Gran % % 73.0  --   --   --    Lymph % % 7.0*  --   --   --    Mono % % 5.0  --   --   --    Eosinophil % % 0.0  --   --   --    Basophil % % 0.0  --   --   --    Differential Method  Manual  --   --   --        Metabolic Panel (last 72 hours):  Recent Labs   Lab Result Units 11/02/20 2138 11/03/20  0420 11/03/20  1752 11/04/20  0402 11/05/20  0610   Sodium mmol/L 136 138 139 138 137   Potassium  mmol/L 3.5 3.6 3.5 3.5 3.7   Chloride mmol/L 110 109 107 105 104   CO2 mmol/L 14* 16* 22* 22* 22*   Glucose mg/dL 108 119* 118* 117* 100   BUN mg/dL 34* 36* 27* 32* 37*   Creatinine mg/dL 2.3* 2.5* 2.1* 2.5* 2.7*   Albumin g/dL  --   --  2.4*  --   --    Phosphorus mg/dL  --  4.7* 3.5  --   --        Vancomycin Administrations:  vancomycin given in the last 96 hours                     vancomycin 500 mg in dextrose 5 % 100 mL IVPB (ready to mix system) (mg) 500 mg New Bag 11/03/20 2100    vancomycin in dextrose 5 % 1 gram/250 mL IVPB 1,000 mg (mg) 1,000 mg New Bag 11/01/20 2200                    Microbiologic Results:  Microbiology Results (last 7 days)       Procedure Component Value Units Date/Time    Blood culture [[id number]] Collected: 10/30/20 2022    Order Status: Completed Specimen: Blood Updated: 11/05/20 0612     Blood Culture, Routine No growth after 5 days.    Narrative:      Change Woods needle and then draw blood culture from the  portacat    Stool culture [[id number]] Collected: 10/31/20 1715    Order Status: Completed Specimen: Stool Updated: 11/04/20 1349     Stool Culture No Salmonella,Shigella,Vibrio,Campylobacter,Yersinia isolated.    Narrative:      [id number]    Stool culture [[id number]] Collected: 10/31/20 0050    Order Status: Completed Specimen: Stool Updated: 11/03/20 1438     Stool Culture No Salmonella,Shigella,Vibrio,Campylobacter,Yersinia isolated.    E. coli 0157 antigen [[id number]] Collected: 10/31/20 0050    Order Status: Completed Specimen: Stool Updated: 11/02/20 1123     Shiga Toxin 1 E.coli Negative     Shiga Toxin 2 E.coli Negative    E. coli 0157 antigen [[id number]] Collected: 10/31/20 1715    Order Status: Completed Specimen: Stool Updated: 11/02/20 1108     Shiga Toxin 1 E.coli Negative     Shiga Toxin 2 E.coli Negative    Narrative:      [id number]    C Diff Toxin by PCR [[id number]] Collected: 10/31/20 1715    Order Status: Completed Updated: 11/01/20 1440     C. diff PCR Negative     Narrative:      [id number]    Urine Culture High Risk [[id number]]  (Abnormal)  (Susceptibility) Collected: 10/29/20 1744    Order Status: Completed Specimen: Urine, Clean Catch Updated: 11/01/20 1350     Urine Culture, Routine PSEUDOMONAS AERUGINOSA  >100,000 cfu/ml      Narrative:      Indicated criteria for high risk culture:->Other  Other (specify):->transplant pt    Blood culture [[id number]]  (Abnormal)  (Susceptibility) Collected: 10/28/20 1728    Order Status: Completed Specimen: Blood Updated: 11/01/20 1050     Blood Culture, Routine Gram stain aer bottle: Gram positive cocci in clusters resembling Staph       Results called to and read back by: Fifi Li, Charge Nurse       10/30/2020  19:25      STAPHYLOCOCCUS EPIDERMIDIS    Narrative:      DRAW FROM [facility]    Clostridium difficile EIA [[id number]]  (Abnormal) Collected: 10/31/20 1715    Order Status: Completed Specimen: Stool Updated: 11/01/20 0133     C. diff Antigen Positive     C difficile Toxins A+B, EIA Negative     Comment: Testing not recommended for children <24 months old.       Narrative:      [id number]    Urine culture [[id number]]  (Abnormal)  (Susceptibility) Collected: 10/28/20 1831    Order Status: Completed Specimen: Urine Updated: 10/31/20 0937     Urine Culture, Routine PSEUDOMONAS AERUGINOSA  > 100,000 cfu/ml      Narrative:      Specimen Source->Urine

## 2020-11-05 NOTE — ASSESSMENT & PLAN NOTE
There is septic shock and hemorrhagic shock.  Much improved after being given albumin and stress dose SoluCortef.  Was able to be weaned off of vasopressors.  Shock resolved.

## 2020-11-05 NOTE — PROGRESS NOTES
"[facility] Hospital Medicine  Progress Note    Patient Name: Alin Burkett  MRN: [id number]  Patient Class: IP- Inpatient   Admission Date: 10/15/2020  Length of Stay: 20 days  Attending Physician: Brennan Decker MD  Primary Care Provider: Carmen Krueger MD        Subjective:     Principal Problem:Lower GI bleeding        HPI:  Patient has been having diarrhea with "jelly-like" consistency as well as crampiness in the abdomen.  Symptoms began roughly one week ago.  Also has had fever and malaise.  Appetite poor.  Fatigue as well.  He has been receiving chemo for PTLD; most recent cycle of CHOP was end of last week.  He has history of renal transplant four years ago and is currently on twice-a-day  Prograf.  He's had no cough, no unusual headache, no sinus pain, and no burning on urination.  He feels that he's probably dehydrated.    Overview/Hospital Course:  No notes on file    Interval History:  Had another dialysis treatment today.  BP stable.  He's in better spirits.    Review of Systems   Constitutional: Positive for appetite change. Negative for chills and fever.   Respiratory: Negative for cough, shortness of breath and wheezing.    Cardiovascular: Negative for chest pain and leg swelling.   Gastrointestinal: Positive for abdominal pain. Negative for nausea.     Objective:     Vital Signs (Most Recent):  Temp: 97.3 °F (36.3 °C) (11/04/20 2000)  Pulse: (!) 56 (11/04/20 1952)  Resp: (!) 22 (11/04/20 1952)  BP: (!) 152/94 (11/04/20 1900)  SpO2: 98 % (11/04/20 1952) Vital Signs (24h Range):  Temp:  [96.6 °F (35.9 °C)-97.6 °F (36.4 °C)] 97.3 °F (36.3 °C)  Pulse:  [52-78] 56  Resp:  [9-28] 22  SpO2:  [95 %-99 %] 98 %  BP: (150-158)/(82-94) 152/94  Arterial Line BP: (132-298)/() 162/87     Weight: 89.8 kg (197 lb 15.6 oz)  Body mass index is 27.61 kg/m².    Intake/Output Summary (Last 24 hours) at 11/4/2020 2137  Last data filed at 11/4/2020 1830  Gross per 24 hour   Intake 2604.58 ml "   Output 2982 ml   Net -377.42 ml      Physical Exam  Vitals signs reviewed.   Constitutional:       General: He is not in acute distress.     Appearance: He is not diaphoretic.   HENT:      Mouth/Throat:      Pharynx: No oropharyngeal exudate.   Eyes:      General: No scleral icterus.        Right eye: No discharge.         Left eye: No discharge.   Neck:      Vascular: No JVD.   Cardiovascular:      Rate and Rhythm: Normal rate and regular rhythm.   Pulmonary:      Effort: Pulmonary effort is normal.      Breath sounds: Examination of the right-lower field reveals rales. Examination of the left-lower field reveals rales. Rales present.   Abdominal:      General: The ostomy site is clean. Bowel sounds are normal. There is no distension.      Palpations: Abdomen is soft.      Tenderness: There is no abdominal tenderness.      Comments: New surgical incision with dressing.  Two drains with serosanguinous liquid.   Musculoskeletal:      Right lower leg: Edema present.      Left lower leg: Edema present.   Skin:     General: Skin is warm.      Findings: No rash.   Neurological:      Mental Status: He is alert.         Significant Labs: All pertinent labs within the past 24 hours have been reviewed.    Significant Imaging: I have reviewed all pertinent imaging results/findings within the past 24 hours.      Assessment/Plan:      * Lower GI bleeding  Colonoscopy showed extensive Cdiff colitis with bleeding.  Patient underwent TAC on hospital day 17.  Lower gi bleeding resolved.    Anemia in stage 3 chronic kidney disease  Hemoglobin   Date Value Ref Range Status   11/04/2020 9.7 (L) 14.0 - 18.0 g/dL Final   11/03/2020 6.3 (L) 14.0 - 18.0 g/dL Final   11/02/2020 6.7 (L) 14.0 - 18.0 g/dL Final   11/02/2020 7.2 (L) 14.0 - 18.0 g/dL Final   11/02/2020 7.9 (L) 14.0 - 18.0 g/dL Final     Patient has anemia of iron deficiency, CKD, and acute blood loss.  Monitor HGB.  Transfuse for HGB of < 7.5 g/dL.    Iron  deficiency  Noted.  Will recheck iron levels at some point.      Septic shock  There is septic shock and hemorrhagic shock.  Much improved after being given albumin and stress dose SoluCortef.  Was able to be weaned off of vasopressors.  Shock resolved.    Moderate malnutrition  Nutrition consulted. Body mass index is 27.61 kg/m².. Encourage maximal PO intake. Diet supplementation ordered per nutrition approval. Will encourage PO and monitor closely for weight changes.  We are supplementing calorie intake through parenteral nutrition.      Hypokalemia due to excessive gastrointestinal loss of potassium  Give supplement and monitor level.    Potassium   Date Value Ref Range Status   2020 3.5 3.5 - 5.1 mmol/L Final   2020 3.5 3.5 - 5.1 mmol/L Final         C. difficile colitis  ID and GI services following.  On multiple antimicrobials.  Endoscopic exam of colon revealed mucosal abnormality c/w Cdiff and bleeding from throughout colon.  ID consultant was concerned about persistent bleeding from mucosa and the refractory nature of the infection.  We consulted with CRS for colectomy.   TAC and ileostomy took place on hospital day 17.  Patient continues on IV metronidazole.    Post-transplant lymphoproliferative disorder (PTLD)  Has been receiving chemo for this.      Acute renal failure superimposed on stage 3 chronic kidney disease  Improving.  Continue monitoring Cr.  Keep MAP > 55.  Nephrologist consultant started him on hemodialysis.    Lab Results   Component Value Date    CREATININE 2.5 (H) 2020             -donor kidney transplant  Continue Prograf at usual dose.  I communicated by secure messaging with Dr. Mcclain, the [place] [facility]  Her recommendation was to keep the trough level around 3 to 4.      Acquired hypothyroidism  Continue levothyroxine.    Hyperchloremic acidosis  Due to bicarb loss through GI tract.  Continue bicarb supplement orally (IV bicarb  discontinued)  Bicarb level is improving.    CO2   Date Value Ref Range Status   11/04/2020 22 (L) 23 - 29 mmol/L Final         VTE Risk Mitigation (From admission, onward)         Ordered     heparin (porcine) injection 3,000 Units  As needed (PRN)      11/02/20 1230     IP VTE LOW RISK PATIENT  Once      10/15/20 2220                Discharge Planning   TRINH: 10/26/2020     Code Status: Full Code   Is the patient medically ready for discharge?: Yes    Reason for patient still in hospital (select all that apply): Patient trending condition and Treatment  Discharge Plan A: Long-term acute care facility (LTAC)            Brennan Decker MD  Department of Hospital Medicine   [facility]

## 2020-11-05 NOTE — ASSESSMENT & PLAN NOTE
Due to bicarb loss through GI tract.  Continue bicarb supplement orally (IV bicarb discontinued)  Bicarb level is improving.    CO2   Date Value Ref Range Status   11/04/2020 22 (L) 23 - 29 mmol/L Final

## 2020-11-05 NOTE — CARE UPDATE
11/05/20 0752   Patient Assessment/Suction   Level of Consciousness (AVPU) alert   Respiratory Effort Normal;Unlabored   Expansion/Accessory Muscles/Retractions expansion symmetric;no retractions;no use of accessory muscles   All Lung Fields Breath Sounds coarse;diminished   Rhythm/Pattern, Respiratory depth regular;pattern regular;unlabored   Cough Frequency infrequent   Cough Type good;nonproductive   PRE-TX-O2   O2 Device (Oxygen Therapy) nasal cannula w/ humidification   $ Is the patient on Low Flow Oxygen? Yes   Flow (L/min) 4  (decreased to 3LNC)   SpO2 100 %   Pulse Oximetry Type Continuous   $ Pulse Oximetry - Multiple Charge Pulse Oximetry - Multiple   Pulse (!) 59   Resp 12   Positioning HOB elevated 30 degrees   Aerosol Therapy   $ Aerosol Therapy Charges PRN treatment not required   Respiratory Treatment Status (SVN) PRN treatment not required   Will continue to monitor

## 2020-11-05 NOTE — SUBJECTIVE & OBJECTIVE
Interval History:  Had another dialysis treatment today.  BP stable.  He's in better spirits.    Review of Systems   Constitutional: Positive for appetite change. Negative for chills and fever.   Respiratory: Negative for cough, shortness of breath and wheezing.    Cardiovascular: Negative for chest pain and leg swelling.   Gastrointestinal: Positive for abdominal pain. Negative for nausea.     Objective:     Vital Signs (Most Recent):  Temp: 97.3 °F (36.3 °C) (11/04/20 2000)  Pulse: (!) 56 (11/04/20 1952)  Resp: (!) 22 (11/04/20 1952)  BP: (!) 152/94 (11/04/20 1900)  SpO2: 98 % (11/04/20 1952) Vital Signs (24h Range):  Temp:  [96.6 °F (35.9 °C)-97.6 °F (36.4 °C)] 97.3 °F (36.3 °C)  Pulse:  [52-78] 56  Resp:  [9-28] 22  SpO2:  [95 %-99 %] 98 %  BP: (150-158)/(82-94) 152/94  Arterial Line BP: (132-298)/() 162/87     Weight: 89.8 kg (197 lb 15.6 oz)  Body mass index is 27.61 kg/m².    Intake/Output Summary (Last 24 hours) at 11/4/2020 2137  Last data filed at 11/4/2020 1830  Gross per 24 hour   Intake 2604.58 ml   Output 2982 ml   Net -377.42 ml      Physical Exam  Vitals signs reviewed.   Constitutional:       General: He is not in acute distress.     Appearance: He is not diaphoretic.   HENT:      Mouth/Throat:      Pharynx: No oropharyngeal exudate.   Eyes:      General: No scleral icterus.        Right eye: No discharge.         Left eye: No discharge.   Neck:      Vascular: No JVD.   Cardiovascular:      Rate and Rhythm: Normal rate and regular rhythm.   Pulmonary:      Effort: Pulmonary effort is normal.      Breath sounds: Examination of the right-lower field reveals rales. Examination of the left-lower field reveals rales. Rales present.   Abdominal:      General: The ostomy site is clean. Bowel sounds are normal. There is no distension.      Palpations: Abdomen is soft.      Tenderness: There is no abdominal tenderness.      Comments: New surgical incision with dressing.  Two drains with serosanguinous  liquid.   Musculoskeletal:      Right lower leg: Edema present.      Left lower leg: Edema present.   Skin:     General: Skin is warm.      Findings: No rash.   Neurological:      Mental Status: He is alert.         Significant Labs: All pertinent labs within the past 24 hours have been reviewed.    Significant Imaging: I have reviewed all pertinent imaging results/findings within the past 24 hours.

## 2020-11-05 NOTE — PLAN OF CARE
Problem: Occupational Therapy Goal  Goal: Occupational Therapy Goal  Description: Goals to be met by: 11/20/2020    Patient will increase functional independence with ADLs by performing:    UE Dressing with Set-up Assistance.  LE Dressing with Minimal Assistance.  Grooming while seated with Set-up Assistance.  Toileting from toilet with Minimal Assistance for hygiene and clothing management.   Sitting at edge of bed x 15 minutes with Supervision.  Toilet transfer to toilet with Minimal Assistance.    Outcome: Ongoing, Progressing  OT POC re-established. POC expiration extended to 11/20/20.

## 2020-11-05 NOTE — ASSESSMENT & PLAN NOTE
Hemoglobin   Date Value Ref Range Status   11/04/2020 9.7 (L) 14.0 - 18.0 g/dL Final   11/03/2020 6.3 (L) 14.0 - 18.0 g/dL Final   11/02/2020 6.7 (L) 14.0 - 18.0 g/dL Final   11/02/2020 7.2 (L) 14.0 - 18.0 g/dL Final   11/02/2020 7.9 (L) 14.0 - 18.0 g/dL Final     Patient has anemia of iron deficiency, CKD, and acute blood loss.  Monitor HGB.  Transfuse for HGB of < 7.5 g/dL.

## 2020-11-05 NOTE — PLAN OF CARE
Not feeling well this am, better this pm.  Dialysis took off one liter of fluid, although remains with generalized edema to all of body.  Is oriented x 4.  Appetite nonexistant.  Pain relieved with IV Morphine.  Wife at bedside most of the day.

## 2020-11-05 NOTE — NURSING
Noted pt with wet under left arm. Upon inspection, bandage that was covering sutures in left elbow was saturated with fluid. Bandage was removed and wound is noted to be open. Pressure dressing was applied at this time. Pt was curious why he is feeling wet, education on 3rd spacing was provided. Pt verbalized understanding.

## 2020-11-05 NOTE — ASSESSMENT & PLAN NOTE
Improving.  Continue monitoring Cr.  Keep MAP > 55.  Nephrologist consultant started him on hemodialysis.    Lab Results   Component Value Date    CREATININE 2.5 (H) 11/04/2020

## 2020-11-05 NOTE — ASSESSMENT & PLAN NOTE
Nutrition consulted. Body mass index is 27.61 kg/m².. Encourage maximal PO intake. Diet supplementation ordered per nutrition approval. Will encourage PO and monitor closely for weight changes.  We are supplementing calorie intake through parenteral nutrition.

## 2020-11-05 NOTE — PROGRESS NOTES
"INPATIENT NEPHROLOGY PROGRESS NOTE  [facility] NEPHROLOGY    Alin Burkett  11/05/2020    Reason for consultation:  Acute kidney injury     Chief Complaint   Patient presents with    Diarrhea     for the past 3 days,last received chemotherapy 6 days ago.     Abdominal Pain     History of Present Illness:  Patient has been having diarrhea with "jelly-like" consistency as well as crampiness in the abdomen. Symptoms began roughly one week ago.  Also has had fever and malaise.  Appetite poor.  Fatigue as well.  He has been receiving chemo for PTLD; most recent cycle of CHOP was end of last week.  He has history of renal transplant four years ago and is currently on twice-a-day  Prograf.  He's had no cough, no unusual headache, no sinus pain, and no burning on urination.     10/20  Has some diarrhea and abdominal pain.  No nausea, chest pain, sob, new neuro symptoms, new joint pain.  He is very weak.    I told him that he had previously been seen by doctor Shonda and I would call him to see him.  He stated he didn't want to see Dr Merchant and requested that I become his nephrologist.    10/21  Not much change in renal function since yesterday. UOP 1.3L.  Sleeping quietly.  10/23  Sleeping.  1550 urine output.    10/24 VSS, no new complains. Appreciate ID and Heme input.  10/25 VSS, no new complains. sCr is better.  10/26 VSS, no new complains. Labs are acceptable. Lethargic at times.  10/27 VSS, no new complains.  10/28 VSS, no new complains. Needs to eat bettter.  10/29 VSS, no new complains.   10/30 VSS, no new complains. Still having blood-tinged diarrhea, treated for c.diff. On TPN due to very poor oral intake. Dr. Malave will cover after 5pm today and over the weekend.  10/31 sleeping.  AFVSS   11/1  Sleeping.  Hypotensive.  Tm 100.5- went for ex lap, TAC with end ileostomy and lysis of adhesion, almost 1L blood loss, transfused post OP  11/2 afebrile, tachy, BP better this AM but remains on darlene, on 4L NC, UOP 1.7L " but slowed down this AM, Hb drop- to get blood transfusion again today- c/w bloody output, also c/w weeping edema, WBC spiked to 22K  11/3- had to terminate HD last night due to hypotension- VSS, BP better, on O2 NC; off levophed, darlene; on bicarb gtt; got albumin yest; got blood products; UOP 300cc; resp issues overnight, improved some with morphine  11/4- febrile, intermittent dejah, BP stable, on 4L NC, remains on bicarb gtt, UOP 250cc; mental status is much improved- able to have full conversation, discussed multiple health issues going on   11/5- afebrile, dejah, SBP > 150, on 4L NC, UOP 500cc    Plan of Care:    1. Septic/Hemorraghic shock in setting of C diff colitis s/p TAC 11/1 with end ileostomy and significant lysis of adhesion  - He is remains off pressors. He is on stress dose steroids.   - He is on midodrine. Giving albumin PRN.  - Trending Hb- last blood transfusion on 11/3.    2. Acute kidney injury 2/2 c.diff colitis  - Due to high obligate intake, weeping edema, dyspnea, need for more blood products, and metabolic derangements (hyponatremia, hyperkalemia, refractory acidemia), he was initiated on RRT on 11/2.  - He did not tolerate HD on 11/2 due to hypotension- terminated after 1.5 hours. He did well with HD on 11/3 and 11/4.   - Ordered another HD/UF treatment for today.  - Dose meds for dialysis.  - No NSAIDs or IV contrast    3. Nongap metabolic acidosis likely combination of GI losses and renal tubular defect (urine pH inappropriately high)  - Acidosis is resolved- continue 40 bicarb bath and PO bicarb 1300mg BID.     4. Hypokalemia  - This is improved- continue 3K bath and oral KCl 40mEq BID.    5. Hypocalcemia  - This is improved- repleting PRN with IV maritza gluc. Continue 3Ca bath.   - Phos is at goal.  - Continue calcitriol.    6. PTLD lymphoma, s/p renal transplant  7. Anemia- multifactorial  - Continue prograf. Check trough this AM- pending.   - Transfused 2 units of blood with HD on 11/3-  trend Hb- nio acute transfusion needs.  - Heme/onc is following.    8. Anasarca- net + 17L   - Ordered 1-2L UF today.  - Optimize nutrition.    He is critically ill with high risk for mortality- overall prognosis is quite poor.   Updated wife at bedside.    Thank you for allowing us to participate in this patient's care. We will continue to follow.    Vital Signs:  Temp Readings from Last 3 Encounters:   11/05/20 97.2 °F (36.2 °C) (Oral)   10/10/20 98.3 °F (36.8 °C) (Oral)   10/09/20 98.6 °F (37 °C)       Pulse Readings from Last 3 Encounters:   11/05/20 (!) 53   10/10/20 95   10/09/20 (!) 56       BP Readings from Last 3 Encounters:   11/05/20 (!) 159/101   10/10/20 102/63   10/09/20 115/76       Weight:  Wt Readings from Last 3 Encounters:   11/05/20 89.9 kg (198 lb 3.1 oz)   10/10/20 68.5 kg (151 lb)   10/09/20 66.4 kg (146 lb 6.2 oz)     Medications:  No current facility-administered medications on file prior to encounter.      Current Outpatient Medications on File Prior to Encounter   Medication Sig Dispense Refill    allopurinoL (ZYLOPRIM) 300 MG tablet Take 1 tablet (300 mg total) by mouth once daily. 30 tablet 2    aspirin (ECOTRIN) 81 MG EC tablet Take 1 tablet (81 mg total) by mouth once daily.  0    calcitRIOL (ROCALTROL) 0.5 MCG Cap Take 1 capsule (0.5 mcg total) by mouth once daily. 30 capsule 11    cefpodoxime (VANTIN) 100 MG tablet Take 1 tablet (100 mg total) by mouth every 12 (twelve) hours. 60 tablet 3    COMBIGAN 0.2-0.5 % Drop Place 1 drop into both eyes 2 (two) times a day.       famotidine (PEPCID) 20 MG tablet TAKE 1 TABLET EVERY EVENING (Patient taking differently: Take 20 mg by mouth every evening. ) 90 tablet 3    ketoconazole (NIZORAL) 200 mg Tab TAKE ONE-HALF (1/2) TABLET ONCE DAILY (Patient taking differently: Take 100 mg by mouth once daily. ) 45 tablet 3    levothyroxine (SYNTHROID) 100 MCG tablet TAKE 1 TABLET DAILY (Patient taking differently: Take 100 mcg by mouth before  breakfast. Administer on an empty stomach at least 30 minutes before food. If receiving tube feeds, HOLD tube feeds for 1 hour before and after levothyroxine administration.) 90 tablet 3    magnesium oxide (MAG-OX) 400 mg (241.3 mg magnesium) tablet Take 1 tablet (400 mg total) by mouth once daily. 90 tablet 3    multivitamin (ONE DAILY MULTIVITAMIN) per tablet Take 1 tablet by mouth once daily.      ondansetron (ZOFRAN-ODT) 8 MG TbDL Dissolve 1 tablet (8 mg total) by mouth every 12 (twelve) hours as needed. (Patient taking differently: Take 8 mg by mouth every 12 (twelve) hours as needed (nausea). ) 21 tablet 1    predniSONE (DELTASONE) 50 MG Tab Take 2 tablets (100 mg total) by mouth once daily. Take on days 2-5 of your chemotherapy cycles. 8 tablet 0    sodium bicarbonate 650 MG tablet Take 1 tablet (650 mg total) by mouth 2 (two) times daily. 540 tablet 3    tacrolimus (PROGRAF) 1 MG Cap Take 3 capsules (3 mg total) by mouth every morning AND 2 capsules (2 mg total) every evening. Z94.0/Kidney Transplant on 11/26/16. 150 capsule 11    travoprost (TRAVATAN Z) 0.004 % ophthalmic solution Place 1 drop into both eyes every evening.        Scheduled Meds:   allopurinoL  100 mg Oral Daily    brimonidine-timoloL  1 drop Both Eyes Q12H    calcitRIOL  0.5 mcg Oral Daily    calcium gluc in NaCl, iso-osm  1 g Intravenous Once    ceFEPime (MAXIPIME) IVPB  1 g Intravenous Q24H    cholestyramine-aspartame  1 packet Oral TID    hydrocortisone sodium succinate  100 mg Intravenous Q6H    levothyroxine  50 mcg Intravenous Daily    metronidazole  500 mg Intravenous Q8H    micafungin (MYCAMINE) IVPB  100 mg Intravenous Q24H    midodrine  10 mg Oral TID    potassium chloride  40 mEq Oral BID    sodium bicarbonate  1,300 mg Oral BID    sucralfate  1 g Oral QID (AC & HS)    tacrolimus  2 mg Oral Daily PM    tacrolimus  3 mg Oral Daily AM    travoprost  1 drop Both Eyes QHS     Continuous Infusions:   sodium  "chloride 0.9% 10 mL/hr at 11/03/20 1800    norepinephrine bitartrate-D5W Stopped (11/03/20 0043)    phenylephrine Stopped (11/02/20 2300)     PRN Meds:.sodium chloride, sodium chloride, sodium chloride, sodium chloride, albuterol-ipratropium, calcium gluconate IVPB, calcium gluconate IVPB, calcium gluconate IVPB, dicyclomine, heparin (porcine), hyoscyamine, magnesium sulfate IVPB, magnesium sulfate IVPB, metoclopramide HCl, morphine, potassium chloride in water **AND** potassium chloride in water **AND** potassium chloride in water, promethazine, sodium chloride 0.9%, sodium chloride 0.9%, sodium chloride 0.9%, Pharmacy to dose Vancomycin consult **AND** vancomycin - pharmacy to dose    Review of Systems:  Sleeping    Physical Exam:    BP (!) 159/101   Pulse (!) 53   Temp 97.2 °F (36.2 °C) (Oral)   Resp (!) 22   Ht 5' 11" (1.803 m)   Wt 89.9 kg (198 lb 3.1 oz)   SpO2 99%   BMI 27.64 kg/m²     Constitutional: nad, ill appearing, appears stated age, calm, still tachypneic  Heart: rrr, no m/r/g, wwp, anasarca  Lungs: coarse, no w/r/r/c, no lb  Abdomen: s/nt/nd, +BS, + ostomy    Results:  Lab Results   Component Value Date     11/05/2020    K 3.7 11/05/2020     11/05/2020    CO2 22 (L) 11/05/2020    BUN 37 (H) 11/05/2020    CREATININE 2.7 (H) 11/05/2020    CALCIUM 7.3 (L) 11/05/2020    ANIONGAP 11 11/05/2020    ESTGFRAFRICA 27 (A) 11/05/2020    EGFRNONAA 23 (A) 11/05/2020       Lab Results   Component Value Date    CALCIUM 7.3 (L) 11/05/2020    PHOS 3.5 11/03/2020       Recent Labs   Lab 11/05/20  0610   WBC 21.90*   RBC 2.99*   HGB 9.0*   HCT 26.7*   PLT 67*   MCV 89   MCH 30.1   MCHC 33.7       I have personally reviewed pertinent radiological imaging and reports.    Roberto Carlos Conn MD MPH  [facility] [address] [address]  [phone] (p)  [phone] (f)    "

## 2020-11-05 NOTE — TELEPHONE ENCOUNTER
----- Message from Marlen Yarbrough sent at 11/5/2020  1:29 PM CST -----  Regarding: speak w/ nurse  Contact: Pt's Wife  Pt's Wife requesting to speak w/ The Nurse    Please call     Phone [phone]

## 2020-11-05 NOTE — PLAN OF CARE
11/04/20 1952   Patient Assessment/Suction   Level of Consciousness (AVPU) responds to voice   Respiratory Effort Unlabored   Expansion/Accessory Muscles/Retractions no use of accessory muscles   Rhythm/Pattern, Respiratory unlabored;pattern regular   Cough Frequency no cough   PRE-TX-O2   O2 Device (Oxygen Therapy) nasal cannula   $ Is the patient on Low Flow Oxygen? Yes   Flow (L/min) 4   Oxygen Concentration (%) 36   SpO2 98 %   Pulse Oximetry Type Continuous   $ Pulse Oximetry - Multiple Charge Pulse Oximetry - Multiple   Pulse (!) 56   Resp (!) 22   Aerosol Therapy   $ Aerosol Therapy Charges PRN treatment not required   Respiratory Treatment Status (SVN) PRN treatment not required   Ready to Wean/Extubation Screen   FIO2<=50 (chart decimal) 0.36

## 2020-11-05 NOTE — PLAN OF CARE
CM called [facility] to check status of fast appeal ([phone]). SHANTE spoke with April. Per April, appeals team does not have a direct number and they will contact me once they have an answer. Stated that she can see that the [facility] appeal is being reviewed and will have answer by 72 hours from when submitted. CM following.        11/05/20 1453   Post-Acute Status   Post-Acute Authorization Placement   Post-Acute Placement Status Pending Payor Medical Review

## 2020-11-05 NOTE — PLAN OF CARE
CM initiated new pt referral for [facility] admissions in anticipation for outpt dialysis. If not needed, CM will cancel request. CM faxed referral to [facility] at [phone]. Per notes, pt wants Dr. Conn as nephrologist.        11/05/20 1252   Post-Acute Status   Post-Acute Authorization Dialysis   Diaylsis Status Referrals Sent

## 2020-11-06 NOTE — PROGRESS NOTES
"INPATIENT NEPHROLOGY PROGRESS NOTE  [facility] NEPHROLOGY    Alin Burkett  11/06/2020    Reason for consultation:  Acute kidney injury     Chief Complaint   Patient presents with    Diarrhea     for the past 3 days,last received chemotherapy 6 days ago.     Abdominal Pain     History of Present Illness:  Patient has been having diarrhea with "jelly-like" consistency as well as crampiness in the abdomen. Symptoms began roughly one week ago.  Also has had fever and malaise.  Appetite poor.  Fatigue as well.  He has been receiving chemo for PTLD; most recent cycle of CHOP was end of last week.  He has history of renal transplant four years ago and is currently on twice-a-day  Prograf.  He's had no cough, no unusual headache, no sinus pain, and no burning on urination.     10/20  Has some diarrhea and abdominal pain.  No nausea, chest pain, sob, new neuro symptoms, new joint pain.  He is very weak.    I told him that he had previously been seen by doctor Shonda and I would call him to see him.  He stated he didn't want to see Dr Merchant and requested that I become his nephrologist.    10/21  Not much change in renal function since yesterday. UOP 1.3L.  Sleeping quietly.  10/23  Sleeping.  1550 urine output.    10/24 VSS, no new complains. Appreciate ID and Heme input.  10/25 VSS, no new complains. sCr is better.  10/26 VSS, no new complains. Labs are acceptable. Lethargic at times.  10/27 VSS, no new complains.  10/28 VSS, no new complains. Needs to eat bettter.  10/29 VSS, no new complains.   10/30 VSS, no new complains. Still having blood-tinged diarrhea, treated for c.diff. On TPN due to very poor oral intake. Dr. Malave will cover after 5pm today and over the weekend.  10/31 sleeping.  AFVSS   11/1  Sleeping.  Hypotensive.  Tm 100.5- went for ex lap, TAC with end ileostomy and lysis of adhesion, almost 1L blood loss, transfused post OP  11/2 afebrile, tachy, BP better this AM but remains on darlene, on 4L NC, UOP 1.7L " but slowed down this AM, Hb drop- to get blood transfusion again today- c/w bloody output, also c/w weeping edema, WBC spiked to 22K  11/3- had to terminate HD last night due to hypotension- VSS, BP better, on O2 NC; off levophed, darlene; on bicarb gtt; got albumin yest; got blood products; UOP 300cc; resp issues overnight, improved some with morphine  11/4- febrile, intermittent dejah, BP stable, on 4L NC, remains on bicarb gtt, UOP 250cc; mental status is much improved- able to have full conversation, discussed multiple health issues going on   11/5- afebrile, dejah, SBP > 150, on 4L NC, UOP 500cc  11/6- tolerated 2L UF yest; afebrile, dejah, intermittent spikes in BP, on 3L NC, UOP 500cc; working with PT- no new complaints, clearly edematous    Plan of Care:    1. Septic/Hemorraghic shock in setting of C diff colitis s/p TAC 11/1 with end ileostomy and significant lysis of adhesion  - He is hemodynamically stable. He is on stress dose steroids. D/C midodrine. Hb is stable.  - Still with high RIO drain output- gen surg is monitoring.     2. Acute kidney injury 2/2 c.diff colitis  - Due to high obligate intake, weeping edema, dyspnea, need for more blood products, and metabolic derangements (hyponatremia, hyperkalemia, refractory acidemia), he was initiated on RRT on 11/2.  - Ordered another HD/UF treatment for today.  - Dose meds for dialysis.  - No NSAIDs or IV contrast    3. Nongap metabolic acidosis likely combination of GI losses and renal tubular defect (urine pH inappropriately high)  - Acidosis is resolved- continue 40 bicarb bath and PO bicarb 1300mg BID.     4. Hypokalemia  - This is stable- continue 3K bath and oral KCl 40mEq BID.  - Check Mg level.    5. Hypocalcemia  - This persists- check vitamin D, PTH- may need to add oscal D.  - Use 3Ca bath.  - Continue calcitriol.    6. PTLD lymphoma, s/p renal transplant  7. Anemia- multifactorial  8. Thrombocytopenia  - Prograf trough elevated- change to 2mg q12  hours.   - Transfused 2 units of blood with HD on 11/3- trend Hb- holding so far- no acute transfusion needs.  - Will use NS to flush HD catheter. Dc'ed heparin.    8. Anasarca- net + 15L   - Ordered 2-3L UF today.  - Optimize nutrition.    His overall prognosis is poor.     Thank you for allowing us to participate in this patient's care. We will continue to follow.    Vital Signs:  Temp Readings from Last 3 Encounters:   11/06/20 98 °F (36.7 °C) (Oral)   10/10/20 98.3 °F (36.8 °C) (Oral)   10/09/20 98.6 °F (37 °C)       Pulse Readings from Last 3 Encounters:   11/06/20 (!) 57   10/10/20 95   10/09/20 (!) 56       BP Readings from Last 3 Encounters:   11/06/20 (!) 141/88   10/10/20 102/63   10/09/20 115/76       Weight:  Wt Readings from Last 3 Encounters:   11/05/20 89.9 kg (198 lb 3.1 oz)   10/10/20 68.5 kg (151 lb)   10/09/20 66.4 kg (146 lb 6.2 oz)     Medications:  No current facility-administered medications on file prior to encounter.      Current Outpatient Medications on File Prior to Encounter   Medication Sig Dispense Refill    allopurinoL (ZYLOPRIM) 300 MG tablet Take 1 tablet (300 mg total) by mouth once daily. 30 tablet 2    aspirin (ECOTRIN) 81 MG EC tablet Take 1 tablet (81 mg total) by mouth once daily.  0    calcitRIOL (ROCALTROL) 0.5 MCG Cap Take 1 capsule (0.5 mcg total) by mouth once daily. 30 capsule 11    cefpodoxime (VANTIN) 100 MG tablet Take 1 tablet (100 mg total) by mouth every 12 (twelve) hours. 60 tablet 3    COMBIGAN 0.2-0.5 % Drop Place 1 drop into both eyes 2 (two) times a day.       famotidine (PEPCID) 20 MG tablet TAKE 1 TABLET EVERY EVENING (Patient taking differently: Take 20 mg by mouth every evening. ) 90 tablet 3    ketoconazole (NIZORAL) 200 mg Tab TAKE ONE-HALF (1/2) TABLET ONCE DAILY (Patient taking differently: Take 100 mg by mouth once daily. ) 45 tablet 3    levothyroxine (SYNTHROID) 100 MCG tablet TAKE 1 TABLET DAILY (Patient taking differently: Take 100 mcg by  mouth before breakfast. Administer on an empty stomach at least 30 minutes before food. If receiving tube feeds, HOLD tube feeds for 1 hour before and after levothyroxine administration.) 90 tablet 3    magnesium oxide (MAG-OX) 400 mg (241.3 mg magnesium) tablet Take 1 tablet (400 mg total) by mouth once daily. 90 tablet 3    multivitamin (ONE DAILY MULTIVITAMIN) per tablet Take 1 tablet by mouth once daily.      ondansetron (ZOFRAN-ODT) 8 MG TbDL Dissolve 1 tablet (8 mg total) by mouth every 12 (twelve) hours as needed. (Patient taking differently: Take 8 mg by mouth every 12 (twelve) hours as needed (nausea). ) 21 tablet 1    predniSONE (DELTASONE) 50 MG Tab Take 2 tablets (100 mg total) by mouth once daily. Take on days 2-5 of your chemotherapy cycles. 8 tablet 0    sodium bicarbonate 650 MG tablet Take 1 tablet (650 mg total) by mouth 2 (two) times daily. 540 tablet 3    tacrolimus (PROGRAF) 1 MG Cap Take 3 capsules (3 mg total) by mouth every morning AND 2 capsules (2 mg total) every evening. Z94.0/Kidney Transplant on 11/26/16. 150 capsule 11    travoprost (TRAVATAN Z) 0.004 % ophthalmic solution Place 1 drop into both eyes every evening.        Scheduled Meds:   allopurinoL  100 mg Oral Daily    brimonidine-timoloL  1 drop Both Eyes Q12H    calcitRIOL  0.5 mcg Oral Daily    calcium gluc in NaCl, iso-osm  1 g Intravenous Once    ceFEPime (MAXIPIME) IVPB  1 g Intravenous Q24H    cholestyramine-aspartame  1 packet Oral TID    hydrocortisone sodium succinate  100 mg Intravenous Q6H    levothyroxine  50 mcg Intravenous Daily    metronidazole  500 mg Intravenous Q8H    micafungin (MYCAMINE) IVPB  100 mg Intravenous Q24H    midodrine  10 mg Oral TID    mupirocin   Topical (Top) Daily    potassium chloride  40 mEq Oral BID    sodium bicarbonate  1,300 mg Oral BID    sucralfate  1 g Oral QID (AC & HS)    tacrolimus  2 mg Oral Daily PM    tacrolimus  3 mg Oral Daily AM    travoprost  1 drop  "Both Eyes QHS     Continuous Infusions:   sodium chloride 0.9% 10 mL/hr at 11/03/20 1800    norepinephrine bitartrate-D5W Stopped (11/03/20 0043)    phenylephrine Stopped (11/02/20 2300)     PRN Meds:.sodium chloride, sodium chloride, sodium chloride, sodium chloride, albuterol-ipratropium, calcium gluconate IVPB, calcium gluconate IVPB, calcium gluconate IVPB, dicyclomine, heparin (porcine), hyoscyamine, magnesium sulfate IVPB, magnesium sulfate IVPB, metoclopramide HCl, morphine, potassium chloride in water **AND** potassium chloride in water **AND** potassium chloride in water, promethazine, sodium chloride 0.9%, sodium chloride 0.9%, sodium chloride 0.9%, Pharmacy to dose Vancomycin consult **AND** vancomycin - pharmacy to dose    Review of Systems:  Sleeping    Physical Exam:    BP (!) 141/88   Pulse (!) 57   Temp 98 °F (36.7 °C) (Oral)   Resp 19   Ht 5' 11" (1.803 m)   Wt 89.9 kg (198 lb 3.1 oz)   SpO2 97%   BMI 27.64 kg/m²     Constitutional: nad, ill appearing, appears stated age, aao x 3  Heart: rrr, no m/r/g, wwp, anasarca  Lungs: coarse, no w/r/r/c, no lb  Abdomen: s/nt/nd, +BS, + ostomy    Results:  Lab Results   Component Value Date     11/05/2020    K 3.7 11/05/2020     11/05/2020    CO2 22 (L) 11/05/2020    BUN 37 (H) 11/05/2020    CREATININE 2.7 (H) 11/05/2020    CALCIUM 7.3 (L) 11/05/2020    ANIONGAP 11 11/05/2020    ESTGFRAFRICA 27 (A) 11/05/2020    EGFRNONAA 23 (A) 11/05/2020       Lab Results   Component Value Date    CALCIUM 7.3 (L) 11/05/2020    PHOS 3.5 11/03/2020       Recent Labs   Lab 11/06/20  0424   WBC 25.82*   RBC 2.95*   HGB 8.9*   HCT 27.0*   PLT 52*   MCV 92   MCH 30.2   MCHC 33.0       I have personally reviewed pertinent radiological imaging and reports.    Roberto Carlos Conn MD MPH  [facility] [address] [address]  [phone] (p)  [phone] (f)    "

## 2020-11-06 NOTE — PROGRESS NOTES
Pt seen earlier this am.    POD 5 s/p ex lap with TAC and end ileostomy for pseudomembranous colitis  Pt in bed.  Awake and conversant.  Minimal abd pain  Pt cont to have dark output from RIO( appears mixture of surgicel/old blood)  No n/v.    NO ileostomy output    Wt Readings from Last 3 Encounters:   11/05/20 89.9 kg (198 lb 3.1 oz)   10/10/20 68.5 kg (151 lb)   10/09/20 66.4 kg (146 lb 6.2 oz)     Temp Readings from Last 3 Encounters:   11/06/20 98 °F (36.7 °C) (Oral)   10/10/20 98.3 °F (36.8 °C) (Oral)   10/09/20 98.6 °F (37 °C)     BP Readings from Last 3 Encounters:   11/06/20 (!) 141/88   10/10/20 102/63   10/09/20 115/76     Pulse Readings from Last 3 Encounters:   11/06/20 (!) 57   10/10/20 95   10/09/20 (!) 56     AAOx3  CTA B  Sinus  Soft/nt/nd few BS appreciate  RIO output evaluated  Inc: c/intact. Slight bloody drianage    Lab Results   Component Value Date    WBC 25.82 (H) 11/06/2020    HGB 8.9 (L) 11/06/2020    HCT 27.0 (L) 11/06/2020    MCV 92 11/06/2020    PLT 52 (L) 11/06/2020       BMP  Lab Results   Component Value Date     11/05/2020    K 3.7 11/05/2020     11/05/2020    CO2 22 (L) 11/05/2020    BUN 37 (H) 11/05/2020    CREATININE 2.7 (H) 11/05/2020    CALCIUM 7.3 (L) 11/05/2020    ANIONGAP 11 11/05/2020    ESTGFRAFRICA 27 (A) 11/05/2020    EGFRNONAA 23 (A) 11/05/2020     Prealbumin: 12    A/P: s/p TAC end ileostomy  Thrombocytopenia; Defer to hospitalist but will likely need transfusion if cont to drift down  ARF: dialysis per nephrology  Increasing wbc: steroids contribute.  Pt af.  Given output from RIO may need to evaluate with ct scan of abd/pelvis.  Would need oral contrast.  Would need plt corrected in case any intervention needed.

## 2020-11-06 NOTE — PROGRESS NOTES
"[facility] Medicine  Progress Note    Patient Name: Alin Burkett  MRN: [id number]  Patient Class: IP- Inpatient   Admission Date: 10/15/2020  Length of Stay: 21 days  Attending Physician: Brennan Decker MD  Primary Care Provider: Carmen Krueger MD        Subjective:     Principal Problem:Acute renal failure superimposed on stage 3 chronic kidney disease        HPI:  Patient has been having diarrhea with "jelly-like" consistency as well as crampiness in the abdomen.  Symptoms began roughly one week ago.  Also has had fever and malaise.  Appetite poor.  Fatigue as well.  He has been receiving chemo for PTLD; most recent cycle of CHOP was end of last week.  He has history of renal transplant four years ago and is currently on twice-a-day  Prograf.  He's had no cough, no unusual headache, no sinus pain, and no burning on urination.  He feels that he's probably dehydrated.    Overview/Hospital Course:  No notes on file    Interval History:  HD done today.  2 liters ultrafiltrate.  Having increased output from one of the surgical drains, and it's dark in color.  Platelet count lower.  WBC higher.  Afebrile.    Review of Systems   Constitutional: Positive for appetite change. Negative for chills and fever.   Respiratory: Negative for cough, shortness of breath and wheezing.    Cardiovascular: Negative for chest pain and leg swelling.   Gastrointestinal: Positive for abdominal distention and abdominal pain. Negative for nausea.     Objective:     Vital Signs (Most Recent):  Temp: 97.8 °F (36.6 °C) (11/05/20 1600)  Pulse: 60 (11/05/20 1900)  Resp: 16 (11/05/20 2027)  BP: (!) 149/87 (11/05/20 1900)  SpO2: 98 % (11/05/20 1900) Vital Signs (24h Range):  Temp:  [97.2 °F (36.2 °C)-98.2 °F (36.8 °C)] 97.8 °F (36.6 °C)  Pulse:  [51-64] 60  Resp:  [7-26] 16  SpO2:  [95 %-100 %] 98 %  BP: (113-175)/() 149/87     Weight: 89.9 kg (198 lb 3.1 oz)  Body mass index is 27.64 kg/m².    Intake/Output " Summary (Last 24 hours) at 11/5/2020 2219  Last data filed at 11/5/2020 1748  Gross per 24 hour   Intake 1600 ml   Output 2042 ml   Net -442 ml      Physical Exam  Vitals signs reviewed.   Constitutional:       General: He is not in acute distress.     Appearance: He is not diaphoretic.   HENT:      Mouth/Throat:      Pharynx: No oropharyngeal exudate.   Eyes:      General: No scleral icterus.        Right eye: No discharge.         Left eye: No discharge.   Neck:      Vascular: No JVD.   Cardiovascular:      Rate and Rhythm: Normal rate and regular rhythm.   Pulmonary:      Effort: Pulmonary effort is normal.      Breath sounds: Examination of the right-lower field reveals rales. Examination of the left-lower field reveals rales. Rales present.   Abdominal:      General: The ostomy site is clean. Bowel sounds are normal. There is no distension.      Palpations: Abdomen is soft.      Tenderness: There is no abdominal tenderness.      Comments: New surgical incision with dressing.  Two drains with serosanguinous liquid.  Small amount of fluid in left RIO.  Large amount in right.  Right RIO bulb has dark fluid; possibly biliary..   Musculoskeletal:      Right forearm: He exhibits edema.      Left forearm: He exhibits edema.      Right lower leg: Edema present.      Left lower leg: Edema present.   Skin:     General: Skin is warm.      Findings: No rash.   Neurological:      Mental Status: He is alert.         Significant Labs: All pertinent labs within the past 24 hours have been reviewed.    Significant Imaging: I have reviewed all pertinent imaging results/findings within the past 24 hours.      Assessment/Plan:      * Acute renal failure superimposed on stage 3 chronic kidney disease  Creatinine high but stable..  Continue monitoring Cr.  Keep MAP > 55.  Nephrologist consultant started him on hemodialysis.    Lab Results   Component Value Date    CREATININE 2.7 (H) 11/05/2020             Thrombocytopenia  Was present  on hospital day 1.  It improved but now, it's getting worse.  Will monitor.  I may end up stopping heparin products.    Lab Results   Component Value Date    PLT 67 (L) 2020           Anemia in stage 3 chronic kidney disease  Hemoglobin   Date Value Ref Range Status   2020 9.0 (L) 14.0 - 18.0 g/dL Final   2020 9.7 (L) 14.0 - 18.0 g/dL Final   2020 6.3 (L) 14.0 - 18.0 g/dL Final   2020 6.7 (L) 14.0 - 18.0 g/dL Final   2020 7.2 (L) 14.0 - 18.0 g/dL Final     Patient has anemia of iron deficiency, CKD, and acute blood loss.  Monitor HGB.  Transfuse for HGB of < 7.5 g/dL.    Iron deficiency  Noted.  Will recheck iron levels at some point.      Moderate malnutrition  Nutrition consulted. Body mass index is 27.64 kg/m².. Encourage maximal PO intake. Diet supplementation ordered per nutrition approval. Will encourage PO and monitor closely for weight changes.  We are supplementing calorie intake through parenteral nutrition; however, I would like to place TPN on pause while the patient is suffering with anasarca and third-spacing.      Hypokalemia due to excessive gastrointestinal loss of potassium  Give supplement and monitor level.    Potassium   Date Value Ref Range Status   2020 3.7 3.5 - 5.1 mmol/L Final   2020 3.5 3.5 - 5.1 mmol/L Final         C. difficile colitis  ID and GI services following.  On multiple antimicrobials.  Endoscopic exam of colon revealed mucosal abnormality c/w Cdiff and bleeding from throughout colon.  ID consultant was concerned about persistent bleeding from mucosa and the refractory nature of the infection.  We consulted with CRS for colectomy.   TAC and ileostomy took place on hospital day 17.  Patient continues on IV metronidazole.    Post-transplant lymphoproliferative disorder (PTLD)  Has been receiving chemo for this.      -donor kidney transplant  Continue Prograf at usual dose.  I communicated by secure messaging with Dr. Mcclain,  the patient's transplant nephrologist at [facility].  Her recommendation was to keep the trough level around 3 to 4.      Acquired hypothyroidism  Continue levothyroxine.    Hyperchloremic acidosis  Due to bicarb loss through GI tract.  Continue bicarb supplement orally (IV bicarb discontinued)  Bicarb level is improving.    CO2   Date Value Ref Range Status   11/05/2020 22 (L) 23 - 29 mmol/L Final         VTE Risk Mitigation (From admission, onward)         Ordered     heparin (porcine) injection 3,000 Units  As needed (PRN)      11/02/20 1230     IP VTE LOW RISK PATIENT  Once      10/15/20 2220                Discharge Planning   TRINH: 10/26/2020     Code Status: Full Code   Is the patient medically ready for discharge?: Yes    Reason for patient still in hospital (select all that apply): Patient trending condition, Laboratory test and Treatment  Discharge Plan A: Long-term acute care facility (LTAC)            Brennan Decker MD  Department of Hospital Medicine   [facility]

## 2020-11-06 NOTE — PROGRESS NOTES
[facility]     Subjective:      Patient: Alin Burkett Patient PCP: Carmen Krueger MD         :  1950     Sex:  male      MRN:  [id number]          Date of Visit: 2020    70-year-old male  Chief Complaint:  Feeling really tired and weak undergoing dialysis currently significant other at bedside nursing staff at bedside  Patient is postop day 5 status post ex lap with TAC and end ileostomy for pseudomembranous colitis.  Reports significant malaise  HPI;  Status post renal transplant posttransplant lymphoproliferative disorder on chemotherapy present the emergency room October 15 with history of chronic diarrhea abdominal cramps found to be neutropenic post chemotherapy patient started empirically on antibiotic admitted to ICU for sepsis due to history of recent positive urinary tract infection now with Pseudomonas patient tested positive for C diff was febrile CT abdomen showed proctocolitis .  Patient with [id number] pseudomembrane active generalized colonic bleeding has been supported with PRBC and platelets status post collected.  Now on TPN after bowel surgery Flagyl has been added patient developed coagulase-negative Staph in blood micafungin also added continues to require support with transfusions while postoperative patient developed acidosis and started hemodialysis  Patient on Prograf post renal transplant  ONCOLOGY HISTORY:     1. Monomorphic post-transplant lymphoproliferative disorder              A. 2020: Noticed left inguinal lymphadenopathy after a dog bite (failed to resolve with antibiotics)              B. 2020: Saw Dr. Collins in infectious diseases for inguinal lymphadenopathy - referred for biopsy              C. 2020: Core biopsy of L inguinal lymph node shows B-cell lymphoma of germinal center origin; EBV-positive by LONNIE; morphology nondiagnostic of PTLD vs follicular lymphoma              D. 2020: PET/CT shows left internal iliac chain node  measuring 3.3 x 3 cm with SUV max 37; L inguinal node measures 3.2 x 2.4 cm with SUV max 36              E. 2020: Excisional biopsy of left inguinal node shows monomorphic post-transplant lymphoproliferative disorder (DLBCL, GCB 60%, follicular lymphoma, grade 3B 40%); FISH for MYC rearrangement is negative              F. 2020: Bone marrow biopsy shows no evidence of B-cell lymphoma              G. 2020: Cycle 1 R-CHOP; course complicated by pansensitive E. Coli UTI              H. 2020: Cycle 2 R-CHOP: tolerated well except possible UTI for which he was treated empirically with antibiotics    October 3 2020:  Planned Cycle 4 day 1 R-CHOP patient was hypotensive and not feeling well therefore chemotherapy was on hold and patient hydrated  Patient continued to feel poorly  presented to the emergency room and admitted  Past Medical History:   Diagnosis Date    Acidosis     Adrenal adenoma     Anemia associated with chronic renal failure     Arrhythmia, onset 2015    Awaiting organ transplant status 2013    Basal cell carcinoma 2012    left nasal tip    Blood type B+ 2013    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  Renal allograft biopsy 17 (DIVINE): 21 glomeruli, none globally sclerosed, <5% interstitial fibrosis, no ACR, c4d negative, AVR CCT Type 2 (V1 lesion); plan THYMO     Dissecting aortic aneurysm (any part), abdominal     Diverticulosis     Encounter for blood transfusion     ESRD (end stage renal disease) 2010    Febrile neutropenia 10/15/2020    H/O urethral stricture 2018    H/O: urethral stricture     History of AAA (abdominal aortic aneurysm) repair      History of urethral stricture 12/19/2018    Hypertension     Hypokalemia     Hypothyroidism 1/10/2014    Inguinal hernia bilateral, non-recurrent     Kidney stones     Organ transplant candidate 11/26/2013    Plantar warts 1/10/2014    Recurrent UTI 7/28/2017    S/P kidney transplant     Secondary hyperparathyroidism, renal     Sepsis 10/24/2020    Thyroid disease      Family History   Problem Relation Age of Onset    Diabetes Mother     Alzheimer's disease Mother     Alcohol abuse Father     HIV Brother     Stroke Maternal Aunt     Kidney disease Paternal Uncle     Kidney disease Cousin     No Known Problems Sister     No Known Problems Daughter     No Known Problems Sister     No Known Problems Brother     No Known Problems Brother     Cancer Brother         thyroid cancer    Colon cancer Brother     Melanoma Neg Hx     Psoriasis Neg Hx     Lupus Neg Hx     Eczema Neg Hx     Colon polyps Neg Hx     Crohn's disease Neg Hx     Ulcerative colitis Neg Hx     Celiac disease Neg Hx      Past Surgical History:   Procedure Laterality Date    ABDOMINAL SURGERY      exploratory lapatomy x 2    ABLATION N/A 8/22/2019    Procedure: ABLATION, SVT;  Surgeon: Emerson Mcmanus MD;  Location: [facility] EP LAB;  Service: Cardiology;  Laterality: N/A;  SVT, RFA, CARTO, anes, GP, 323    AORTA - SUPERIOR MESENTERIC ARTERY BYPASS GRAFT      BLADDER NECK RECONSTRUCTION      BLADDER SURGERY      CHOLECYSTECTOMY N/A 11/1/2020    Procedure: CHOLECYSTECTOMY;  Surgeon: Roger Guerrero MD;  Location: [facility] OR;  Service: General;  Laterality: N/A;    COLONOSCOPY  10/10/2013    Dr. Gutierrez, repeat in 5 years    COLONOSCOPY N/A 10/31/2020    Procedure: COLONOSCOPY;  Surgeon: Bharath Edwards Jr., MD;  Location: [facility];  Service: Endoscopy;  Laterality: N/A;    CYSTOSCOPY      CYSTOSCOPY N/A 12/19/2018    Procedure: CYSTOSCOPY;  Surgeon: Dewey Mann MD;  [address];  Service: Urology;   Laterality: N/A;  45 min    CYSTOURETHROSCOPY WITH DIRECT VISION INTERNAL URETHROTOMY N/A 12/19/2018    Procedure: CYSTOSCOPY, WITH DIRECT VISION INTERNAL URETHROTOMY;  Surgeon: Dewey Mann MD;  [facility];  Service: Urology;  Laterality: N/A;    DILATION OF URETHRA N/A 12/19/2018    Procedure: DILATION, URETHRA;  Surgeon: Dewey Mann MD;  [facility];  Service: Urology;  Laterality: N/A;    ESOPHAGOGASTRODUODENOSCOPY N/A 10/31/2020    Procedure: ESOPHAGOGASTRODUODENOSCOPY (EGD);  Surgeon: Bharath Edwards Jr., MD;  Location: [facility];  Service: Endoscopy;  Laterality: N/A;    EXCISIONAL BIOPSY N/A 7/13/2020    Procedure: EXCISIONAL BIOPSY- LEFT INGUINAL NODE;  Surgeon: Vlad Epstein MD;  Location: [address];  Service: General;  Laterality: N/A;    FLEXIBLE CYSTOSCOPY N/A 11/6/2019    Procedure: CYSTOSCOPY, FLEXIBLE;  Surgeon: Dewey Mann MD;  [address];  Service: Urology;  Laterality: N/A;    GASTROJEJUNOSTOMY  ~1997    HEMORRHOID SURGERY      HERNIA REPAIR      ILEOSTOMY Right 11/1/2020    Procedure: CREATION, ILEOSTOMY;  Surgeon: Roger Guerrero MD;  Location: [facility];  Service: General;  Laterality: Right;    INSERTION OF VENOUS ACCESS PORT Left 7/27/2020    Procedure: INSERTION, VENOUS ACCESS PORT;  Surgeon: Vlad Epstein MD;  Location: [address];  Service: General;  Laterality: Left;    KIDNEY TRANSPLANT      LEFT HEART CATHETERIZATION Left 8/20/2019    Procedure: Left heart cath;  Surgeon: Javan Oscar MD;  Location: [facility] CATH/EP LAB;  Service: Cardiology;  Laterality: Left;    LITHOTRIPSY      LYMPH NODE BIOPSY N/A 6/30/2020    Procedure: BIOPSY, LYMPH NODE;  Surgeon: [facility];  [facility];  Service: General;  Laterality: N/A;  189 lymph node biopsy /ULTRASOUND    PERCUTANEOUS NEPHROLITHOTRIPSY      right  ESWL  10/31/12    right ESWL  6/26/12    TOTAL ABDOMINAL  COLECTOMY N/A 11/1/2020    Procedure: COLECTOMY, TOTAL, ABDOMINAL;  Surgeon: Roger Guerrero MD;  Location: [facility];  Service: General;  Laterality: N/A;    URETHROPLASTY USING PATCH GRAFT N/A 11/6/2019    Procedure: URETHROPLASTY, USING PATCH GRAFT BUCCAL MUCOSA GRAFT;  Surgeon: Dewey Mann MD;  Location: [facility] OR [address] [place] [address];  Service: Urology;  Laterality: N/A;  3 HOURS     Social History     Socioeconomic History    Marital status: Single     Spouse name: Not on file    Number of children: Not on file    Years of education: Not on file    Highest education level: Not on file   Occupational History     Employer: Disabled   Social Needs    Financial resource strain: Not on file    Food insecurity     Worry: Not on file     Inability: Not on file    Transportation needs     Medical: Not on file     Non-medical: Not on file   Tobacco Use    Smoking status: Former Smoker     Packs/day: 0.50     Years: 40.00     Pack years: 20.00     Types: Cigarettes     Quit date: 6/16/2010     Years since quitting: 10.4    Smokeless tobacco: Never Used   Substance and Sexual Activity    Alcohol use: Yes     Alcohol/week: 3.0 standard drinks     Types: 3 Cans of beer per week     Comment: occasional/social    Drug use: No     Comment: THC in youth    Sexual activity: Yes     Partners: Female     Birth control/protection: None   Lifestyle    Physical activity     Days per week: Not on file     Minutes per session: Not on file    Stress: Not on file   Relationships    Social connections     Talks on phone: Not on file     Gets together: Not on file     Attends [employer]ology service: Not on file     Active member of club or organization: Not on file     Attends meetings of clubs or organizations: Not on file     Relationship status: Not on file   Other Topics Concern    Not on file   Social History Narrative    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       Current Facility-Administered Medications:     0.9%  NaCl  infusion (for blood administration), , Intravenous, Q24H PRN, Roger Guerrero MD    0.9%  NaCl infusion (for blood administration), , Intravenous, Q24H PRN, Roger Guerrero MD    0.9%  NaCl infusion (for blood administration), , Intravenous, Q24H PRN, Roberto Carlos Conn MD    0.9%  NaCl infusion (for blood administration), , Intravenous, Q24H PRN, Alexi Camp PA-C    0.9%  NaCl infusion, , Intravenous, Continuous, Roger Guerrero MD, Last Rate: 10 mL/hr at 11/03/20 1800    albuterol-ipratropium 2.5 mg-0.5 mg/3 mL nebulizer solution 3 mL, 3 mL, Nebulization, Q6H PRN, Roger Guerrero MD, 3 mL at 10/20/20 1602    allopurinoL tablet 100 mg, 100 mg, Oral, Daily, Roger Guerrero MD, 100 mg at 11/06/20 0915    brimonidine-timoloL 0.2-0.5 % ophthalmic solution 1 drop, 1 drop, Both Eyes, Q12H, Roger Guerrero MD, 1 drop at 11/06/20 1047    calcitRIOL capsule 0.5 mcg, 0.5 mcg, Oral, Daily, Roger Guerrero MD, 0.5 mcg at 11/06/20 0914    calcium gluc in NaCl, iso-osm 1 gram/50 mL Soln 1,000 mg, 1 g, Intravenous, Once, Roger Guerrero MD    calcium gluconate 1g in dextrose 5% 100mL (ready to mix system), 1 g, Intravenous, PRN, Roger Guerrero MD, 1 g at 11/01/20 1334    calcium gluconate 2 g in dextrose 5 % 100 mL IVPB, 2 g, Intravenous, PRN, Roger Guerrero MD    calcium gluconate 3 g in dextrose 5 % 100 mL IVPB, 3 g, Intravenous, PRN, Roger Guerrero MD    cefepime in dextrose 5 % 1 gram/50 mL IVPB 1 g, 1 g, Intravenous, Q24H, Lola Tolbert MD, Last Rate: 100 mL/hr at 11/06/20 0910, 1 g at 11/06/20 0910    cholestyramine-aspartame 4 gram packet 4 g, 1 packet, Oral, TID, Roger Guerrero MD, 4 g at 11/06/20 0908    dicyclomine capsule 10 mg, 10 mg, Oral, QID PRN, Roger Guerrero MD    hydrocortisone sodium succinate injection 100 mg, 100 mg, Intravenous, Q6H, Lola Tolbert MD, 100 mg at 11/06/20 1536    hyoscyamine ODT 0.125 mg, 0.125 mg, Sublingual, Q4H PRN, Roger Guerrero,  MD, 0.125 mg at 11/01/20 0816    iohexoL (OMNIPAQUE 9) 9 mg iodine/mL oral solution, , , ,     levothyroxine injection 50 mcg, 50 mcg, Intravenous, Daily, Brennan Decker MD, 50 mcg at 11/06/20 0909    magnesium sulfate 2g in water 50mL IVPB (premix), 2 g, Intravenous, PRN, Roger Guerrero MD    magnesium sulfate 2g in water 50mL IVPB (premix), 4 g, Intravenous, PRN, Roger Guerrero MD    metoclopramide HCl injection 10 mg, 10 mg, Intravenous, Q6H PRN, Roger Guerrero MD    metronidazole IVPB 500 mg, 500 mg, Intravenous, Q8H, Roger Guerrero MD, Last Rate: 100 mL/hr at 11/06/20 1047, 500 mg at 11/06/20 1047    micafungin 100 mg in sodium chloride 0.9 % 100 mL IVPB (ready to mix system), 100 mg, Intravenous, Q24H, Roger Guerrero MD, Last Rate: 100 mL/hr at 11/05/20 1547, 100 mg at 11/05/20 1547    morphine injection 2 mg, 2 mg, Intravenous, Q3H PRN, Roger Guerrero MD, 2 mg at 11/06/20 0615    mupirocin 2 % ointment, , Topical (Top), Daily, Brennan Decker MD    norepinephrine 16 mg in dextrose 5 % 250 mL infusion, 0.02 mcg/kg/min, Intravenous, Continuous, Brennan Decker MD, Stopped at 11/03/20 0043    norepinephrine bitartrate-D5W 4 mg/250 mL (16 mcg/mL) infusion Soln, , , ,     phenylephrine (GATITO-SYNEPHRINE) 100 mg in sodium chloride 0.9% 250 mL infusion, 0.5 mcg/kg/min, Intravenous, Continuous, Brennan Decker MD, Stopped at 11/02/20 2300    potassium chloride 10 mEq in 100 mL IVPB, 40 mEq, Intravenous, PRN, Last Rate: 100 mL/hr at 10/20/20 0729, 40 mEq at 10/20/20 0729 **AND** potassium chloride 10 mEq in 100 mL IVPB, 60 mEq, Intravenous, PRN, Last Rate: 100 mL/hr at 11/01/20 0628, 60 mEq at 11/01/20 0628 **AND** potassium chloride 10 mEq in 100 mL IVPB, 80 mEq, Intravenous, PRN, Roger Guerrero MD    potassium chloride SA CR tablet 40 mEq, 40 mEq, Oral, BID, Roberto Carlos Conn MD, 40 mEq at 11/06/20 0914    promethazine tablet 25 mg, 25 mg, Oral, Q6H PRN, Roger Guerrero MD     "sodium bicarbonate tablet 1,300 mg, 1,300 mg, Oral, BID, Roberto Carlos Conn MD, 1,300 mg at 11/06/20 0914    sodium chloride 0.9% flush 10 mL, 10 mL, Intravenous, PRN, Roger Guerrero MD    sodium chloride 0.9% flush 10 mL, 10 mL, Intravenous, PRN, Gilmer Adler MD    sodium chloride 0.9% flush 10 mL, 10 mL, Intravenous, PRN, Gilmer Adler MD    sucralfate 100 mg/mL suspension 1 g, 1 g, Oral, QID (AC & HS), Roger Guerrero MD, 1 g at 11/06/20 1046    tacrolimus capsule 2 mg, 2 mg, Oral, Daily PM, Roger Guerrero MD, 2 mg at 11/05/20 1746    [START ON 11/7/2020] tacrolimus capsule 2 mg, 2 mg, Oral, Daily AM, Roberto Carlos Conn MD    travoprost 0.004 % ophthalmic solution 1 drop, 1 drop, Both Eyes, QHS, Roger Guerrero MD, 1 drop at 11/05/20 2145    Pharmacy to dose Vancomycin consult, , , Once **AND** vancomycin - pharmacy to dose, , Intravenous, pharmacy to manage frequency, Roger Guerrero MD    vancomycin 500 mg in dextrose 5 % 100 mL IVPB (ready to mix system), 500 mg, Intravenous, Once, Brennan Decker MD   sodium chloride 0.9% 10 mL/hr at 11/03/20 1800    norepinephrine bitartrate-D5W Stopped (11/03/20 0043)    phenylephrine Stopped (11/02/20 2300)     Review of patient's allergies indicates:  No Known Allergies  All medications, allergies, and past history have been reviewed.  Objective:      Vitals:  Vitals - 1 value per visit 10/20/2020 11/5/2020 11/6/2020   SYSTOLIC - - 105   DIASTOLIC - - 72   PULSE - - 69   TEMPERATURE - - 97.7   RESPIRATIONS - - 15   SPO2 - - 96   Weight (lb) - 198.19 -   Weight (kg) - 89.9 -   HEIGHT 5' 11" - -   BODY MASS INDEX - - -   VISIT REPORT - - -   Pain Score  - - -   Some recent data might be hidden       Body surface area is 2.12 meters squared.  Weight Readings:  Wt Readings from Last 3 Encounters:   11/05/20 89.9 kg (198 lb 3.1 oz)   10/10/20 68.5 kg (151 lb)   10/09/20 66.4 kg (146 lb 6.2 oz)      Blood Type:  B POS     Physical Exam   Wt Readings " from Last 3 Encounters:   11/05/20 89.9 kg (198 lb 3.1 oz)   10/10/20 68.5 kg (151 lb)   10/09/20 66.4 kg (146 lb 6.2 oz)     Temp Readings from Last 3 Encounters:   11/06/20 97.4 °F (36.3 °C) (Axillary)   10/10/20 98.3 °F (36.8 °C) (Oral)   10/09/20 98.6 °F (37 °C)     BP Readings from Last 3 Encounters:   11/06/20 108/71   10/10/20 102/63   10/09/20 115/76     Pulse Readings from Last 3 Encounters:   11/06/20 67   10/10/20 95   10/09/20 (!) 56     Patient is awake alert very frail looking weak.  Ill-appearing  Has remained afebrile.  Tele showing intermittent Hany  Working with physical therapy  He remains NPO  Abdomen showing ostomy dressing bowel sounds are present  Cardiac exam is benign generalized anasarca is most pronounced in this patient  Occasionally on hemodialysis patient becomes more hypotensive  Bilaterally lungs are clear to auscultation occasional rales  Bilateral 3rd spacing noted to lower extremities dialysis in progress  Bilateral arms with Kerlix dressings  RIO drains still draining dark brown liquid 700 cc from right 250 cc from left abdomen dressing noted  Has rectal tube  Labs:  Lab Results   Component Value Date    WBC 25.82 (H) 11/06/2020    HGB 8.9 (L) 11/06/2020    HCT 27.0 (L) 11/06/2020    MCV 92 11/06/2020    PLT 52 (L) 11/06/2020     CMP  Sodium   Date Value Ref Range Status   11/06/2020 138 136 - 145 mmol/L Final     Potassium   Date Value Ref Range Status   11/06/2020 4.6 3.5 - 5.1 mmol/L Final     Chloride   Date Value Ref Range Status   11/06/2020 103 95 - 110 mmol/L Final     CO2   Date Value Ref Range Status   11/06/2020 24 23 - 29 mmol/L Final     Glucose   Date Value Ref Range Status   11/06/2020 109 70 - 110 mg/dL Final     BUN   Date Value Ref Range Status   11/06/2020 43 (H) 8 - 23 mg/dL Final     Creatinine   Date Value Ref Range Status   11/06/2020 2.8 (H) 0.5 - 1.4 mg/dL Final     Calcium   Date Value Ref Range Status   11/06/2020 7.5 (L) 8.7 - 10.5 mg/dL Final     Total  Protein   Date Value Ref Range Status   11/02/2020 4.3 (L) 6.0 - 8.4 g/dL Final     Albumin   Date Value Ref Range Status   11/06/2020 1.8 (L) 3.5 - 5.2 g/dL Final     Total Bilirubin   Date Value Ref Range Status   11/02/2020 0.8 0.1 - 1.0 mg/dL Final     Comment:     For infants and newborns, interpretation of results should be based  on gestational age, weight and in agreement with clinical  observations.  Premature Infant recommended reference ranges:  Up to 24 hours.............<8.0 mg/dL  Up to 48 hours............<12.0 mg/dL  3-5 days..................<15.0 mg/dL  6-29 days.................<15.0 mg/dL       Alkaline Phosphatase   Date Value Ref Range Status   11/02/2020 41 (L) 55 - 135 U/L Final     AST   Date Value Ref Range Status   11/02/2020 47 (H) 10 - 40 U/L Final     ALT   Date Value Ref Range Status   11/02/2020 14 10 - 44 U/L Final     Anion Gap   Date Value Ref Range Status   11/06/2020 11 8 - 16 mmol/L Final     eGFR if    Date Value Ref Range Status   11/06/2020 25 (A) >60 mL/min/1.73 m^2 Final     eGFR if non    Date Value Ref Range Status   11/06/2020 22 (A) >60 mL/min/1.73 m^2 Final     Comment:     Calculation used to obtain the estimated glomerular filtration  rate (eGFR) is the CKD-EPI equation.            Imaging:   Results for orders placed or performed during the hospital encounter of 10/15/20 (from the past 2160 hour(s))   CT Abdomen Pelvis  Without Contrast    Impression    Findings consistent with severe proctocolitis.    Transplanted kidney right side of the pelvis with mild pelvocaliectasis and perinephric stranding nonspecific.  No calcified stones seen    Additional findings as detailed above including cystic lesion with in the native right kidney versus dilatation of collecting structure of the native right kidney.  Stones in the native right kidney.  Cystic lesion within the pancreas.    Final read    Virtual Radiology  concordant      Electronically signed by: Ada Castillo MD  Date:    10/16/2020  Time:    08:56     *Note: Due to a large number of results and/or encounters for the requested time period, some results have not been displayed. A complete set of results can be found in Results Review.         Assessment and Plan:        ICD-10-CM ICD-9-CM   1. Colitis due to Clostridium difficile  A04.72 008.45   2. Tachycardia  R00.0 785.0   3. Febrile neutropenia  D70.9 288.00    R50.81 780.61   4. Septic shock  A41.9 038.9    R65.21 785.52     995.92   5. Anemia due to chemotherapy  D64.81 285.3    T45.1X5A E933.1   6. Anemia in stage 3 chronic kidney disease, unspecified whether stage 3a or 3b CKD  N18.30 285.21    D63.1 585.3   7. Post-transplant lymphoproliferative disorder (PTLD)  T86.99 996.80    D47.Z1 238.77   8. Thrombocytopenia  D69.6 287.5   9. Iron deficiency  E61.1 280.9   10. C. difficile colitis  A04.72 008.45   11. Gastrointestinal hemorrhage, unspecified gastrointestinal hemorrhage type  K92.2 578.9   12. S/P kidney transplant  Z94.0 V42.0   13. Rectal bleeding  K62.5 569.3   14. Lower GI bleeding  K92.2 578.9   15. S/P ileostomy  Z93.2 V44.2   16. Acute renal failure superimposed on stage 3 chronic kidney disease  N17.9 584.9    N18.30 585.3   17. Skin tear of right elbow without complication, initial encounter  S51.011A 881.01            Very frail severely ill posttransplant lymphoproliferative disorder patient with pseudomembranous colitis status post exlap TAC with RIO drains showing excessive drainage  Seen by Dr. noble today possibly repeat CT scan  Patient continues id follow-up with assistance from ID services for severe C diff colitis/septic shock.  Continues vancomycin, cefepime, Flagyl, Mycamine  Patient has been on steroids which are being tapered  Currently PTLD/EBV related lymphoma treatment on hold since October 1st week with severe pancytopenia related to multiple reasons including lymphoma,  chemotherapy, sepsis, multiple antibiotics  Continue with supportive transfusion of platelets and PRBC as needed  Acute kidney injury with dialysis  Palliative care discussions have been underway from us with  Patient.  Patient and family have expressed that they would like every effort made to continue to treat and support patient  Will follow

## 2020-11-06 NOTE — PLAN OF CARE
POC reviewed with patient and family. Afebrile. Remains NPO. Abdomen midline dsg intact. RIO drains bilat abd. with dark brown drainage. Ostomy with clear light green output. Generalized weeping  edema a little better today. New orders for wound care to arms. Flexi seal removed per order. Barker to gravity. Dialysis today - reported 2 L off. Patient had episode of hypotension and dropping sats to 80's after dialysis and after drinking 500 cc contrast for CT scan. Able to go to CT on 4 L NC. Sats upper 90's. B/P stable. Back to room. Dr. Decker spoke to S.O. extensively regarding care. Safety measures in place.

## 2020-11-06 NOTE — ASSESSMENT & PLAN NOTE
Give supplement and monitor level.    Potassium   Date Value Ref Range Status   11/05/2020 3.7 3.5 - 5.1 mmol/L Final   11/04/2020 3.5 3.5 - 5.1 mmol/L Final

## 2020-11-06 NOTE — PT/OT/SLP RE-EVAL
Physical Therapy Re-evaluation    Patient Name:  Alin Burkett   MRN:  [id number]    Recommendations:     Discharge Recommendations:  LTACH (long-term acute care hospital)   Discharge Equipment Recommendations: (TBD)   Barriers to discharge: Decreased caregiver support    Assessment:     Alin Burkett is a 70 y.o. male admitted with a medical diagnosis of Acute renal failure superimposed on stage 3 chronic kidney disease.  He presents with the following impairments/functional limitations:  weakness, impaired endurance, impaired self care skills, impaired functional mobilty, impaired balance, impaired cognition, decreased upper extremity function, decreased lower extremity function, impaired skin, edema, impaired cardiopulmonary response to activity . PT re consulted at ICU. Pt alert and interactive with good eye contact/participation. Edema noted in all extremeties Pt completed thera ex and repositioning in bed.     Rehab Prognosis:  fair; patient would benefit from acute skilled PT services to address these deficits and reach maximum level of function.      Recent Surgery: Procedure(s) (LRB):  LAPAROTOMY, EXPLORATORY (N/A)  COLON RESECTION (N/A)  COLECTOMY, TOTAL, ABDOMINAL (N/A)  CHOLECYSTECTOMY (N/A)  CREATION, ILEOSTOMY (Right) 5 Days Post-Op    Plan:     During this hospitalization, patient to be seen 6 x/week to address the above listed problems via therapeutic activities, therapeutic exercises, gait training  · Plan of Care Expires:  11/30/20   Plan of Care Reviewed with: patient    Subjective     Communicated with nurse Arambula prior to session.  Patient found HOB elevated with blood pressure cuff, central line, oxygen, peripheral IV, pulse ox (continuous), telemetry, fournier catheter, RIO drain upon PT entry to room, agreeable to evaluation.      Chief Complaint: none  Patient comments/goals: get OOB soon  Pain/Comfort:  · Pain Rating 1: 0/10    Patients cultural, spiritual, [employer] conflicts given the  current situation:        Objective:     Patient found with: blood pressure cuff, central line, oxygen, peripheral IV, pulse ox (continuous), telemetry, fournier catheter, RIO drain     General Precautions: Standard, fall   Orthopedic Precautions:N/A   Braces: N/A     Exams:  · RLE ROM: WFL  · RLE Strength: Deficits: 2/5  · LLE ROM: WFL  · LLE Strength: Deficits: 2/5    Functional Mobility:  · Bed Mobility:     · Scooting: total assistance    AM-PAC 6 CLICK MOBILITY  Total Score:7       Therapeutic Activities and Exercises:   Patient was educated on the importance of OOB activity and functional mobility to negate negative effects of prolonged bed rest during hospitalization, safe transfers and ambulation, and D/C planning   thera ex to LE's with AP,QS, assisted SLR,abd/add/HS  Pt unable to complete SLR bilaterally  Long sitting attempt max/total assist  Repositioning in bed total assist    Patient left HOB elevated with all lines intact and call button in reach.    GOALS:   Multidisciplinary Problems     Physical Therapy Goals        Problem: Physical Therapy Goal    Goal Priority Disciplines Outcome Goal Variances Interventions   Physical Therapy Goal     PT, PT/OT Ongoing, Progressing     Description: Goals to be met by: 2020     Patient will increase functional independence with mobility by performin. Supine to sit with MInimal Assistance  2. Sit to stand transfer with Minimal Assistance  3. Bed to chair transfer with Minimal Assistance using Rolling Walker  4. Gait  x 250 feet with Minimal Assistance using Rolling Walker.   5. Lower extremity exercise program x20 reps                      History:     Past Medical History:   Diagnosis Date    Acidosis     Adrenal adenoma     Anemia associated with chronic renal failure     Arrhythmia, onset 2015    Awaiting organ transplant status 2013    Basal cell carcinoma 2012    left nasal tip    Blood type B+ 2013    Calcium  nephrolithiasis 10/16/2012    Cancer     Celiac artery dissection     Chronic diarrhea     Chronic urethral stricture     Congenital absence of kidney     left    -donor kidney transplant 16     Induced w Campath 30 mg IV intraoperatively & SoluMedrol 875 mg total over 3 days.  Renal allograft biopsy 17 (DIVINE): 21 glomeruli, none globally sclerosed, <5% interstitial fibrosis, no ACR, c4d negative, AVR CCT Type 2 (V1 lesion); plan THYMO     Dissecting aortic aneurysm (any part), abdominal     Diverticulosis     Encounter for blood transfusion     ESRD (end stage renal disease) 2010    Febrile neutropenia 10/15/2020    H/O urethral stricture 2018    H/O: urethral stricture     History of AAA (abdominal aortic aneurysm) repair     History of urethral stricture 2018    Hypertension     Hypokalemia     Hypothyroidism 1/10/2014    Inguinal hernia bilateral, non-recurrent     Kidney stones     Organ transplant candidate 2013    Plantar warts 1/10/2014    Recurrent UTI 2017    S/P kidney transplant     Secondary hyperparathyroidism, renal     Sepsis 10/24/2020    Thyroid disease        Past Surgical History:   Procedure Laterality Date    ABDOMINAL SURGERY      exploratory lapatomy x 2    ABLATION N/A 2019    Procedure: ABLATION, SVT;  Surgeon: Emerson Mcmanus MD;  Location: [facility] EP LAB;  Service: Cardiology;  Laterality: N/A;  SVT, RFA, CARTO, anes, GP, 323    AORTA - SUPERIOR MESENTERIC ARTERY BYPASS GRAFT      BLADDER NECK RECONSTRUCTION      BLADDER SURGERY      CHOLECYSTECTOMY N/A 2020    Procedure: CHOLECYSTECTOMY;  Surgeon: Roger Guerrero MD;  Location: [facility] OR;  Service: General;  Laterality: N/A;    COLONOSCOPY  10/10/2013    Dr. Gutierrez, repeat in 5 years    COLONOSCOPY N/A 10/31/2020    Procedure: COLONOSCOPY;  Surgeon: Bharath Edwards Jr., MD;  Location: [facility] ENDO;  Service: Endoscopy;  Laterality: N/A;    CYSTOSCOPY       CYSTOSCOPY N/A 12/19/2018    Procedure: CYSTOSCOPY;  Surgeon: Dewey Mann MD;  [facility];  Service: Urology;  Laterality: N/A;  45 min    CYSTOURETHROSCOPY WITH DIRECT VISION INTERNAL URETHROTOMY N/A 12/19/2018    Procedure: CYSTOSCOPY, WITH DIRECT VISION INTERNAL URETHROTOMY;  Surgeon: Dewey Mann MD;  [facility];  Service: Urology;  Laterality: N/A;    DILATION OF URETHRA N/A 12/19/2018    Procedure: DILATION, URETHRA;  Surgeon: Dewey Mann MD;  [facility];  Service: Urology;  Laterality: N/A;    ESOPHAGOGASTRODUODENOSCOPY N/A 10/31/2020    Procedure: ESOPHAGOGASTRODUODENOSCOPY (EGD);  Surgeon: Bharath Edwards Jr., MD;  Location: [facility];  Service: Endoscopy;  Laterality: N/A;    EXCISIONAL BIOPSY N/A 7/13/2020    Procedure: EXCISIONAL BIOPSY- LEFT INGUINAL NODE;  Surgeon: Vlad Epstein MD;  [address]  Service: General;  Laterality: N/A;    FLEXIBLE CYSTOSCOPY N/A 11/6/2019    Procedure: CYSTOSCOPY, FLEXIBLE;  Surgeon: Dewey Mann MD;  [address]  Service: Urology;  Laterality: N/A;    GASTROJEJUNOSTOMY  ~1997    HEMORRHOID SURGERY      HERNIA REPAIR      ILEOSTOMY Right 11/1/2020    Procedure: CREATION, ILEOSTOMY;  Surgeon: Roger Guerrero MD;  Location: [facility];  Service: General;  Laterality: Right;    INSERTION OF VENOUS ACCESS PORT Left 7/27/2020    Procedure: INSERTION, VENOUS ACCESS PORT;  Surgeon: Vlad Epstein MD;  [facility];  Service: General;  Laterality: Left;    KIDNEY TRANSPLANT      LEFT HEART CATHETERIZATION Left 8/20/2019    Procedure: Left heart cath;  Surgeon: Javan Oscar MD;  Location: [facility] CATH/EP LAB;  Service: Cardiology;  Laterality: Left;    LITHOTRIPSY      LYMPH NODE BIOPSY N/A 6/30/2020    Procedure: BIOPSY, LYMPH NODE;  Surgeon: [facility];  [facility];  Service: General;  Laterality: N/A;  189 lymph node  biopsy /ULTRASOUND    PERCUTANEOUS NEPHROLITHOTRIPSY      right  ESWL  10/31/12    right ESWL  6/26/12    TOTAL ABDOMINAL COLECTOMY N/A 11/1/2020    Procedure: COLECTOMY, TOTAL, ABDOMINAL;  Surgeon: Roger Guerrero MD;  Location: [facility];  Service: General;  Laterality: N/A;    URETHROPLASTY USING PATCH GRAFT N/A 11/6/2019    Procedure: URETHROPLASTY, USING PATCH GRAFT BUCCAL MUCOSA GRAFT;  Surgeon: Dewey Mann MD;  Location: [address];  Service: Urology;  Laterality: N/A;  3 HOURS       Time Tracking:     PT Received On: 11/06/20  PT Start Time: 1018     PT Stop Time: 1049  PT Total Time (min): 31 min     Billable Minutes: Re-eval 11 and Therapeutic Exercise 20      Rubina Barnes, PT  11/06/2020

## 2020-11-06 NOTE — ASSESSMENT & PLAN NOTE
Hemoglobin   Date Value Ref Range Status   11/05/2020 9.0 (L) 14.0 - 18.0 g/dL Final   11/04/2020 9.7 (L) 14.0 - 18.0 g/dL Final   11/03/2020 6.3 (L) 14.0 - 18.0 g/dL Final   11/02/2020 6.7 (L) 14.0 - 18.0 g/dL Final   11/02/2020 7.2 (L) 14.0 - 18.0 g/dL Final     Patient has anemia of iron deficiency, CKD, and acute blood loss.  Monitor HGB.  Transfuse for HGB of < 7.5 g/dL.

## 2020-11-06 NOTE — SUBJECTIVE & OBJECTIVE
Interval History:  HD done today.  2 liters ultrafiltrate.  Having increased output from one of the surgical drains, and it's dark in color.  Platelet count lower.  WBC higher.  Afebrile.    Review of Systems   Constitutional: Positive for appetite change. Negative for chills and fever.   Respiratory: Negative for cough, shortness of breath and wheezing.    Cardiovascular: Negative for chest pain and leg swelling.   Gastrointestinal: Positive for abdominal distention and abdominal pain. Negative for nausea.     Objective:     Vital Signs (Most Recent):  Temp: 97.8 °F (36.6 °C) (11/05/20 1600)  Pulse: 60 (11/05/20 1900)  Resp: 16 (11/05/20 2027)  BP: (!) 149/87 (11/05/20 1900)  SpO2: 98 % (11/05/20 1900) Vital Signs (24h Range):  Temp:  [97.2 °F (36.2 °C)-98.2 °F (36.8 °C)] 97.8 °F (36.6 °C)  Pulse:  [51-64] 60  Resp:  [7-26] 16  SpO2:  [95 %-100 %] 98 %  BP: (113-175)/() 149/87     Weight: 89.9 kg (198 lb 3.1 oz)  Body mass index is 27.64 kg/m².    Intake/Output Summary (Last 24 hours) at 11/5/2020 2219  Last data filed at 11/5/2020 1748  Gross per 24 hour   Intake 1600 ml   Output 2042 ml   Net -442 ml      Physical Exam  Vitals signs reviewed.   Constitutional:       General: He is not in acute distress.     Appearance: He is not diaphoretic.   HENT:      Mouth/Throat:      Pharynx: No oropharyngeal exudate.   Eyes:      General: No scleral icterus.        Right eye: No discharge.         Left eye: No discharge.   Neck:      Vascular: No JVD.   Cardiovascular:      Rate and Rhythm: Normal rate and regular rhythm.   Pulmonary:      Effort: Pulmonary effort is normal.      Breath sounds: Examination of the right-lower field reveals rales. Examination of the left-lower field reveals rales. Rales present.   Abdominal:      General: The ostomy site is clean. Bowel sounds are normal. There is no distension.      Palpations: Abdomen is soft.      Tenderness: There is no abdominal tenderness.      Comments: New  surgical incision with dressing.  Two drains with serosanguinous liquid.  Small amount of fluid in left RIO.  Large amount in right.  Right RIO bulb has dark fluid; possibly biliary..   Musculoskeletal:      Right forearm: He exhibits edema.      Left forearm: He exhibits edema.      Right lower leg: Edema present.      Left lower leg: Edema present.   Skin:     General: Skin is warm.      Findings: No rash.   Neurological:      Mental Status: He is alert.         Significant Labs: All pertinent labs within the past 24 hours have been reviewed.    Significant Imaging: I have reviewed all pertinent imaging results/findings within the past 24 hours.

## 2020-11-06 NOTE — ASSESSMENT & PLAN NOTE
Creatinine high but stable..  Continue monitoring Cr.  Keep MAP > 55.  Nephrologist consultant started him on hemodialysis.    Lab Results   Component Value Date    CREATININE 2.7 (H) 11/05/2020

## 2020-11-06 NOTE — ASSESSMENT & PLAN NOTE
Due to bicarb loss through GI tract.  Continue bicarb supplement orally (IV bicarb discontinued)  Bicarb level is improving.    CO2   Date Value Ref Range Status   11/05/2020 22 (L) 23 - 29 mmol/L Final

## 2020-11-06 NOTE — PLAN OF CARE
CM received call from Nikos from [employer] [phone]. Stated decision for LTAC denial has been upheld. Stated case has been sent to [facility] for outside review team. Stated will have an answer by 72 hours. CM following.        11/06/20 0900   Post-Acute Status   Post-Acute Authorization Placement   Post-Acute Placement Status Insurance Denial

## 2020-11-06 NOTE — PLAN OF CARE
Problem: Physical Therapy Goal  Goal: Physical Therapy Goal  Description: Goals to be met by: 2020     Patient will increase functional independence with mobility by performin. Supine to sit with MInimal Assistance  2. Sit to stand transfer with Minimal Assistance  3. Bed to chair transfer with Minimal Assistance using Rolling Walker  4. Gait  x 250 feet with Minimal Assistance using Rolling Walker.   5. Lower extremity exercise program x20 reps     Outcome: Ongoing, Progressing   PT re eval completed. Pt seen for thera ex in supine. Pt with gen edema. Max/total assist mobility

## 2020-11-06 NOTE — ASSESSMENT & PLAN NOTE
Was present on hospital day 1.  It improved but now, it's getting worse.  Will monitor.  I may end up stopping heparin products.    Lab Results   Component Value Date    PLT 67 (L) 11/05/2020

## 2020-11-06 NOTE — PROGRESS NOTES
Pharmacokinetic Assessment Follow Up: IV Vancomycin    Vancomycin serum concentration assessment(s):    The random level was drawn correctly and can be used to guide therapy at this time. The measurement is above the desired definitive target range of 15 to 20 mcg/mL.    Vancomycin Regimen Plan:    Patient scheduled for HD today.Vanc dose will be 500 mg at 1700 postHD on 11/06/2020.    Vancomycin level ordered with AM labs on 11/07/2020.    Drug levels (last 3 results):  Recent Labs   Lab Result Units 11/04/20  0402 11/05/20  0610 11/06/20  0423   Vancomycin, Random ug/mL 23.3 18.1 19.1       Pharmacy will continue to follow and monitor vancomycin.    Please contact pharmacy at extension [phone] for questions regarding this assessment.    Thank you for the consult,   Naun Martínez       Patient brief summary:  Alin Burkett is a 70 y.o. male initiated on antimicrobial therapy with IV Vancomycin for treatment of bacteremia    The patient's current regimen is pulse dosing    Drug Allergies:   Review of patient's allergies indicates:  No Known Allergies    Actual Body Weight:   89.9kg    Renal Function:   Estimated Creatinine Clearance: 26.5 mL/min (A) (based on SCr of 2.8 mg/dL (H)).,     Dialysis Method (if applicable):  intermittent HD PRN    CBC (last 72 hours):  Recent Labs   Lab Result Units 11/04/20  0402 11/05/20  0610 11/06/20  0424   WBC K/uL 17.83* 21.90* 25.82*   Hemoglobin g/dL 9.7* 9.0* 8.9*   Hematocrit % 27.4* 26.7* 27.0*   Platelets K/uL 79* 67* 52*       Metabolic Panel (last 72 hours):  Recent Labs   Lab Result Units 11/03/20  1752 11/04/20  0402 11/05/20  0610 11/06/20  1027   Sodium mmol/L 139 138 137 138   Potassium mmol/L 3.5 3.5 3.7 4.6   Chloride mmol/L 107 105 104 103   CO2 mmol/L 22* 22* 22* 24   Glucose mg/dL 118* 117* 100 109   BUN mg/dL 27* 32* 37* 43*   Creatinine mg/dL 2.1* 2.5* 2.7* 2.8*   Albumin g/dL 2.4*  --   --  1.8*   Magnesium mg/dL  --   --   --  1.6   Phosphorus mg/dL 3.5  --    --  3.6  3.6       Vancomycin Administrations:  vancomycin given in the last 96 hours                     vancomycin 500 mg/100 mL IVPB 500 mg (mg) 500 mg New Bag 11/05/20 1748    vancomycin 500 mg in dextrose 5 % 100 mL IVPB (ready to mix system) (mg) 500 mg New Bag 11/03/20 2100                    Microbiologic Results:  Microbiology Results (last 7 days)       Procedure Component Value Units Date/Time    Blood culture [[id number]] Collected: 10/30/20 2022    Order Status: Completed Specimen: Blood Updated: 11/05/20 0612     Blood Culture, Routine No growth after 5 days.    Narrative:      Change Woods needle and then draw blood culture from the  [facility]    Stool culture [[id number]] Collected: 10/31/20 1715    Order Status: Completed Specimen: Stool Updated: 11/04/20 1349     Stool Culture No Salmonella,Shigella,Vibrio,Campylobacter,Yersinia isolated.    Narrative:      [id number]    Stool culture [[id number]] Collected: 10/31/20 0050    Order Status: Completed Specimen: Stool Updated: 11/03/20 1438     Stool Culture No Salmonella,Shigella,Vibrio,Campylobacter,Yersinia isolated.    E. coli 0157 antigen [[id number]] Collected: 10/31/20 0050    Order Status: Completed Specimen: Stool Updated: 11/02/20 1123     Shiga Toxin 1 E.coli Negative     Shiga Toxin 2 E.coli Negative    E. coli 0157 antigen [[id number]] Collected: 10/31/20 1715    Order Status: Completed Specimen: Stool Updated: 11/02/20 1108     Shiga Toxin 1 E.coli Negative     Shiga Toxin 2 E.coli Negative    Narrative:      [id number]    C Diff Toxin by PCR [[id number]] Collected: 10/31/20 1715    Order Status: Completed Updated: 11/01/20 1440     C. diff PCR Negative    Narrative:      [id number]    Urine Culture High Risk [[id number]]  (Abnormal)  (Susceptibility) Collected: 10/29/20 1744    Order Status: Completed Specimen: Urine, Clean Catch Updated: 11/01/20 1350     Urine Culture, Routine PSEUDOMONAS AERUGINOSA  >100,000 cfu/ml      Narrative:      Indicated criteria  for high risk culture:->Other  Other (specify):->transplant pt    Blood culture [[id number]]  (Abnormal)  (Susceptibility) Collected: 10/28/20 1728    Order Status: Completed Specimen: Blood Updated: 11/01/20 1050     Blood Culture, Routine Gram stain aer bottle: Gram positive cocci in clusters resembling Staph       Results called to and read back by: Fifi Li, Charge Nurse       10/30/2020  19:25      STAPHYLOCOCCUS EPIDERMIDIS    Narrative:      DRAW FROM [facility]    Clostridium difficile EIA [[id number]]  (Abnormal) Collected: 10/31/20 1715    Order Status: Completed Specimen: Stool Updated: 11/01/20 0133     C. diff Antigen Positive     C difficile Toxins A+B, EIA Negative     Comment: Testing not recommended for children <24 months old.       Narrative:      [id number]    Urine culture [[id number]]  (Abnormal)  (Susceptibility) Collected: 10/28/20 1831    Order Status: Completed Specimen: Urine Updated: 10/31/20 0937     Urine Culture, Routine PSEUDOMONAS AERUGINOSA  > 100,000 cfu/ml      Narrative:      Specimen Source->Urine

## 2020-11-06 NOTE — PLAN OF CARE
POC reviewed with patient. Afebrile. Morphine iv as needed for pain. RIO drains bilateral abdomen with dark brown drainage. More output from Right RIO. Ostomy with scant amount of drainage. Mid abd dressing intact. Abd taut and round. Hypoactive BS. Flexi seal to gravity. Barker to gravity. Generalized edema. Gresham removed due to swelling, leaking, and positional. Dialysis completed and was reported 2 L off. S.O. at bedside most of day. Notified Dr. Decker of frequent PVC's. No new orders noted at this time.

## 2020-11-06 NOTE — CONSULTS
Consulted due to bilateral upper arm skin tears and abrasion areas. Recommendations to clean with wound cleanser and apply silver restore dressing to wound bed and cover with bordered foam dressings and change every 2 days and prn.

## 2020-11-06 NOTE — PLAN OF CARE
Pt has confirmed chair time at [facility] on [facility] chair time 1:45pm. Start date 11/9/20. CM explained to April ([facility] admissions) that outpt dialysis need not confirmed yet. Stated they will keep confirm chair time until I tell her otherwise. CM following.      11/05/20 1500   Post-Acute Status   Post-Acute Authorization Dialysis   Home Health Status Set-up Complete

## 2020-11-06 NOTE — PROGRESS NOTES
Progress Note  Infectious Disease    Reason for Consult:  C difficile colitis, neutropenia, sepsis    HPI: Alin Burkett is a   70 y.o. male who is status post renal transplant and is receiving chemotherapy for diffuse large B-cell lymphoma, presented to the emergency room on 10/15 with worsening of chronic diarrhea, abdominal cramps of 1 weeks duration.  He was found to be neutropenic with a white blood cell count of 0.2, volume depleted, hypokalemic, was cultured and given fluid resuscitation and vancomycin and cefepime.  He was admitted to the intensive care unit. He was recently given cefpodoxime to take prophylactically associated with his chemotherapy for recurring urinary tract infections.  Most recent positive urine culture was September 18th with E coli sensitive to 3rd generation cephalosporins carbapenems  He has had a hectic fever, stool for C difficile toxin was obtained and was resulted as positive today.  he has had a positive C difficile test before, August 2019.  White blood cells remain depressed at 0.1, platelets are depressed at 91,000, creatinine 1.6.  CT scan of the abdomen obtained last night shows severe proctocolitis without megacolon. Neupogen has been ordered. He is discouraged from eating by severe cramps.    10/17-10/30: notes please review previous    10/31: called to endoscopy lab to see wall to wall pseudomembranes, active generalized colonic bleeding. Patient required blood transfusion and volume expansion prior to the procedure. He will be moved to ICU after procedure. Discussed with Dr. Edwards and patient's wife and Dr. Decker that he should have a colectomy.   11/2: pt in OR on rounds yesterday. Spoke with wife and checked chart several times. Today acidemia required initiation of HD, trialysis placed, more blood given. Antibiotics simplified to Vanc IV (I think the one blood culture with CoNS from PAC was a contaminant), cefepime (for pseudomonas UTI) and IV flagyl. Also on  mycamine pending esophageal biopsies. Stopped oral vanc, vanc enemas, dificid and eravacycline last night.   11/3: interim reviewed, including Dr. Guerrero response to my question. Off pressors, on high dose solucortef. WBC down to 13, platelets 103. Wife reports that he was on prednisone daily at home(not on his home med list)  11/4:  Interim reviewed.  Alert able to cooperate, requiring small amount of oxygen, clear liquid diet started.  White blood cells higher likely from steroids, hemoglobin 9.7, platelets continue to drop at 79,000, dialysis given today.  No new imaging  11/5:  Interval reviewed, afebrile on steroids, white blood cells slightly increased.  Day 7 of cefepime for Pseudomonas UTI, day 7 for coagulase-negative Staph in the blood, micafungin for prophylaxis while on TPN and after bowel surgery, and Flagyl for remnant of rectum at risk.  Pathology of the GI biopsies noted.  Platelets are lower at 67.  No new imaging    Antibiotics (From admission, onward)    Start     Stop Route Frequency Ordered    11/05/20 1700  vancomycin 500 mg/100 mL IVPB 500 mg      -- IV Once 11/05/20 1039    11/04/20 0901  cefepime in dextrose 5 % 1 gram/50 mL IVPB 1 g      -- IV Every 24 hours (non-standard times) 11/03/20 1708    10/31/20 1900  metronidazole IVPB 500 mg  (C. difficile Infection (CDI) Treatment Order Panel)      -- IV Every 8 hours (non-standard times) 10/31/20 1751    10/30/20 2035  vancomycin - pharmacy to dose  (vancomycin IVPB)      -- IV pharmacy to manage frequency 10/30/20 1935             EXAM & DIAGNOSTICS REVIEWED:   Vitals:     Temp:  [97.2 °F (36.2 °C)-98.2 °F (36.8 °C)]   Temp: 98 °F (36.7 °C) (11/05/20 1415)  Pulse: 64 (11/05/20 1715)  Resp: 12 (11/05/20 1715)  BP: 113/84 (11/05/20 1715)  SpO2: 99 % (11/05/20 1715)    Intake/Output Summary (Last 24 hours) at 11/5/2020 6724  Last data filed at 11/5/2020 1748  Gross per 24 hour   Intake 1600 ml   Output 2352 ml   Net -752 ml        General:  Alert,  appropriate, generally edematous  Eyes:   anicteric, EOMI  ENT:   no oral lesions  Neck:  Supple   Lungs: Minimal Bibasilar crackles  Heart:  RRR, no gallop/murmur/rub noted  Abd:  Soft,    BS positive, ileostomy RLQ with serous fluid, no guarding. Rectal tube with thin dark fluid. JPs with dark, thin fluid  :  Barker, improved, urine output  no flank tenderness  Musc:  Joints without effusion, swelling, erythema, synovitis,    Skin:  No rashes.    Wound: Bandages not disturbed  Neuro: Alert, speech fluent, non focal  Psych:   calm cooperative appropriate   Extrem: generalized edema, no erythema, phlebitis, cellulitis, warm and well perfused  VAD:  portacath without redness, drainage, left IJ Trialysis cath    Isolation:  none         General Labs reviewed:  Recent Labs   Lab 11/03/20  0420 11/04/20  0402 11/05/20  0610   WBC 13.06*  13.06* 17.83* 21.90*   HGB 6.3* 9.7* 9.0*   HCT 18.0* 27.4* 26.7*   * 79* 67*       Recent Labs   Lab 11/02/20  0522  11/03/20  1752 11/04/20  0402 11/05/20  0610   *   < > 139 138 137   K 5.1   < > 3.5 3.5 3.7   *   < > 107 105 104   CO2 <5*   < > 22* 22* 22*   BUN 38*   < > 27* 32* 37*   CREATININE 2.4*   < > 2.1* 2.5* 2.7*   CALCIUM 6.7*   < > 7.5* 7.2* 7.3*   PROT 4.3*  --   --   --   --    BILITOT 0.8  --   --   --   --    ALKPHOS 41*  --   --   --   --    ALT 14  --   --   --   --    AST 47*  --   --   --   --     < > = values in this interval not displayed.     Micro:  Microbiology Results (last 7 days)     Procedure Component Value Units Date/Time    Blood culture [[id number]] Collected: 10/30/20 2022    Order Status: Completed Specimen: Blood Updated: 11/05/20 0612     Blood Culture, Routine No growth after 5 days.    Narrative:      Change Woods needle and then draw blood culture from the  [facility]    Stool culture [[id number]] Collected: 10/31/20 2761    Order Status: Completed Specimen: Stool Updated: 11/04/20 1349     Stool Culture No  Salmonella,Shigella,Vibrio,Campylobacter,Yersinia isolated.    Narrative:      [id number]    Stool culture [[id number]] Collected: 10/31/20 0050    Order Status: Completed Specimen: Stool Updated: 11/03/20 1438     Stool Culture No Salmonella,Shigella,Vibrio,Campylobacter,Yersinia isolated.    E. coli 0157 antigen [[id number]] Collected: 10/31/20 0050    Order Status: Completed Specimen: Stool Updated: 11/02/20 1123     Shiga Toxin 1 E.coli Negative     Shiga Toxin 2 E.coli Negative    E. coli 0157 antigen [[id number]] Collected: 10/31/20 1715    Order Status: Completed Specimen: Stool Updated: 11/02/20 1108     Shiga Toxin 1 E.coli Negative     Shiga Toxin 2 E.coli Negative    Narrative:      [id number]    C Diff Toxin by PCR [[id number]] Collected: 10/31/20 1715    Order Status: Completed Updated: 11/01/20 1440     C. diff PCR Negative    Narrative:      [id number]    Urine Culture High Risk [[id number]]  (Abnormal)  (Susceptibility) Collected: 10/29/20 1744    Order Status: Completed Specimen: Urine, Clean Catch Updated: 11/01/20 1350     Urine Culture, Routine PSEUDOMONAS AERUGINOSA  >100,000 cfu/ml      Narrative:      Indicated criteria for high risk culture:->Other  Other (specify):->transplant pt    Blood culture [[id number]]  (Abnormal)  (Susceptibility) Collected: 10/28/20 1728    Order Status: Completed Specimen: Blood Updated: 11/01/20 1050     Blood Culture, Routine Gram stain aer bottle: Gram positive cocci in clusters resembling Staph       Results called to and read back by: Fifi Li, Charge Nurse       10/30/2020  19:25      STAPHYLOCOCCUS EPIDERMIDIS    Narrative:      DRAW FROM [facility]    Clostridium difficile EIA [[id number]]  (Abnormal) Collected: 10/31/20 1715    Order Status: Completed Specimen: Stool Updated: 11/01/20 0133     C. diff Antigen Positive     C difficile Toxins A+B, EIA Negative     Comment: Testing not recommended for children <24 months old.       Narrative:      [id number]    Urine culture  [[id number]]  (Abnormal)  (Susceptibility) Collected: 10/28/20 1831    Order Status: Completed Specimen: Urine Updated: 10/31/20 0937     Urine Culture, Routine PSEUDOMONAS AERUGINOSA  > 100,000 cfu/ml      Narrative:      Specimen Source->Urine        Imaging Reviewed:   KUBThree round radiodensities overlie the left upper quadrant may represent ingested pills or be in the patient's clothing.  A radiodense thread is noted over the right lower quadrant of uncertain significance.   CXR clear   CT abdomen and pelvis 10/16 :    Findings consistent with severe proctocolitis.    Transplanted kidney right side of the pelvis with mild pelvocaliectasis and perinephric stranding nonspecific.  No calcified stones seen Additional findings as detailed above including cystic lesion with in the native right kidney versus dilatation of collecting structure of the native right kidney.  Stones in the native right kidney.  Cystic lesion within the pancreas  Cardiology:    IMPRESSION & PLAN   1. Severe C. Difficile colitis, improved from ICU status of septic shock, but still having diarrhea despite aggressive treatment     Prompted by oral antibiotics and/or chemotherapy   History of Cdiff 8/2019    REFRACTORY SEVERE PSEUDOMEMBRANOUS COLITIS WITH GENERALIZED COLONIC BLEEDING. S/p colectomy 11/1    2. PTLD, EBV related lymphoma, receiving chemotherapy      neutropenia, resolved  3. S/p renal transplant on immunosuppression, DIVINE, refractory acidemia requiring initiation of dialysis 11/2  4. Chronic diarrhea  5. History of recurrent UTIs  6. Anorexia  7. Pseudomonas UTI 10/29(1st cx from urinal , second culture is CCMS)  8. GI bleeding 10/30  9. Blood culture with GPC 10/29, significance unknown    Recommendations:  Continue vanc, cefepime and flagyl, mycamine  Recommend reducing steroids further  Ok with me to remove rectal tube     Home med list needs to be adjusted to include prednisone. Wife will clarify 5 mg or 10 mg daily          Tino to cover for the next 4 days

## 2020-11-06 NOTE — PROGRESS NOTES
Progress Note  Infectious Disease    Reason for Consult:  C difficile colitis, neutropenia, sepsis    HPI: Alin Burkett is a   70 y.o. male who is status post renal transplant and is receiving chemotherapy for diffuse large B-cell lymphoma, presented to the emergency room on 10/15 with worsening of chronic diarrhea, abdominal cramps of 1 weeks duration.  He was found to be neutropenic with a white blood cell count of 0.2, volume depleted, hypokalemic, was cultured and given fluid resuscitation and vancomycin and cefepime.  He was admitted to the intensive care unit. He was recently given cefpodoxime to take prophylactically associated with his chemotherapy for recurring urinary tract infections.  Most recent positive urine culture was September 18th with E coli sensitive to 3rd generation cephalosporins carbapenems  He has had a hectic fever, stool for C difficile toxin was obtained and was resulted as positive today.  he has had a positive C difficile test before, August 2019.  White blood cells remain depressed at 0.1, platelets are depressed at 91,000, creatinine 1.6.  CT scan of the abdomen obtained last night shows severe proctocolitis without megacolon. Neupogen has been ordered. He is discouraged from eating by severe cramps.    10/17-10/30: notes please review previous    10/31: called to endoscopy lab to see wall to wall pseudomembranes, active generalized colonic bleeding. Patient required blood transfusion and volume expansion prior to the procedure. He will be moved to ICU after procedure. Discussed with Dr. Edwards and patient's wife and Dr. Decker that he should have a colectomy.   11/2: pt in OR on rounds yesterday. Spoke with wife and checked chart several times. Today acidemia required initiation of HD, trialysis placed, more blood given. Antibiotics simplified to Vanc IV (I think the one blood culture with CoNS from [facility] was a contaminant), cefepime (for pseudomonas UTI) and IV flagyl. Also on  mycamine pending esophageal biopsies. Stopped oral vanc, vanc enemas, dificid and eravacycline last night.   11/3: interim reviewed, including Dr. Guerrero response to my question. Off pressors, on high dose solucortef. WBC down to 13, platelets 103. Wife reports that he was on prednisone daily at home(not on his home med list)  11/4:  Interim reviewed.  Alert able to cooperate, requiring small amount of oxygen, clear liquid diet started.  White blood cells higher likely from steroids, hemoglobin 9.7, platelets continue to drop at 79,000, dialysis given today.  No new imaging  11/5:  Interval reviewed, afebrile on steroids, white blood cells slightly increased.  Day 7 of cefepime for Pseudomonas UTI, day 7 for coagulase-negative Staph in the blood, micafungin for prophylaxis while on TPN and after bowel surgery, and Flagyl for remnant of rectum at risk.  Pathology of the GI biopsies noted.  Platelets are lower at 67.  No new imaging    11/06/2020.  Tired.  Afebrile.  Marked third-spacing throughout.  He will go for dialysis today.  General surgeon has os nurse to limit p.o. intake given that there is quite a lot of output from bilateral drains.  He also us to discontinue rectal tube which the nurse will do today.  Tolerating antibiotics well.    Antibiotics (From admission, onward)    Start     Stop Route Frequency Ordered    11/05/20 2115  mupirocin 2 % ointment      -- Top Daily 11/05/20 2114    11/04/20 0901  cefepime in dextrose 5 % 1 gram/50 mL IVPB 1 g      -- IV Every 24 hours (non-standard times) 11/03/20 1708    10/31/20 1900  metronidazole IVPB 500 mg  (C. difficile Infection (CDI) Treatment Order Panel)      -- IV Every 8 hours (non-standard times) 10/31/20 1751    10/30/20 2035  vancomycin - pharmacy to dose  (vancomycin IVPB)      -- IV pharmacy to manage frequency 10/30/20 1935        Antifungals (From admission, onward)    Start     Stop Route Frequency Ordered    11/01/20 1645  micafungin 100 mg in  sodium chloride 0.9 % 100 mL IVPB (ready to mix system)      -- IV Every 24 hours (non-standard times) 11/01/20 1541        Antivirals (From admission, onward)    None               EXAM & DIAGNOSTICS REVIEWED:   Vitals:     Temp:  [97.8 °F (36.6 °C)-98.2 °F (36.8 °C)]   Temp: 98 °F (36.7 °C) (11/06/20 0730)  Pulse: (!) 57 (11/06/20 0730)  Resp: 19 (11/06/20 0730)  BP: (!) 141/88 (11/06/20 0730)  SpO2: 97 % (11/06/20 0730)    Intake/Output Summary (Last 24 hours) at 11/6/2020 0936  Last data filed at 11/6/2020 0700  Gross per 24 hour   Intake 810 ml   Output 1145 ml   Net -335 ml       General:  Alert, appropriate, generally edematous  Eyes:   anicteric, EOMI  ENT:   no oral lesions  Neck:  Supple   Lungs: Minimal Bibasilar crackles  Heart:  RRR, no gallop/murmur/rub noted  Abd:  Soft,    BS positive, ileostomy RLQ with minimal stools?, no guarding. Rectal tube with thin dark fluid. JPs with dark, thin fluid  :  Barker,no flank tenderness  Musc:  Joints without effusion, swelling, erythema, synovitis,    Skin:  No rashes.    Wound: Bandages not disturbed  Neuro: Alert, speech fluent, non focal  Psych:   calm cooperative appropriate   Extrem: generalized edema, no erythema, phlebitis, cellulitis, warm and well perfused  VAD:  portacath without redness, drainage, left IJ Trialysis cath    Isolation:  none         General Labs reviewed:  Recent Labs   Lab 11/04/20  0402 11/05/20  0610 11/06/20  0424   WBC 17.83* 21.90* 25.82*   HGB 9.7* 9.0* 8.9*   HCT 27.4* 26.7* 27.0*   PLT 79* 67* 52*       Recent Labs   Lab 11/02/20  0522  11/03/20  1752 11/04/20  0402 11/05/20  0610   *   < > 139 138 137   K 5.1   < > 3.5 3.5 3.7   *   < > 107 105 104   CO2 <5*   < > 22* 22* 22*   BUN 38*   < > 27* 32* 37*   CREATININE 2.4*   < > 2.1* 2.5* 2.7*   CALCIUM 6.7*   < > 7.5* 7.2* 7.3*   PROT 4.3*  --   --   --   --    BILITOT 0.8  --   --   --   --    ALKPHOS 41*  --   --   --   --    ALT 14  --   --   --   --    AST 47*   --   --   --   --     < > = values in this interval not displayed.     Micro:  Microbiology Results (last 7 days)     Procedure Component Value Units Date/Time    Blood culture [[id number]] Collected: 10/30/20 2022    Order Status: Completed Specimen: Blood Updated: 11/05/20 0612     Blood Culture, Routine No growth after 5 days.    Narrative:      Change Woods needle and then draw blood culture from the  [facility]    Stool culture [[id number]] Collected: 10/31/20 1715    Order Status: Completed Specimen: Stool Updated: 11/04/20 1349     Stool Culture No Salmonella,Shigella,Vibrio,Campylobacter,Yersinia isolated.    Narrative:      [id number]    Stool culture [[id number]] Collected: 10/31/20 0050    Order Status: Completed Specimen: Stool Updated: 11/03/20 1438     Stool Culture No Salmonella,Shigella,Vibrio,Campylobacter,Yersinia isolated.    E. coli 0157 antigen [[id number]] Collected: 10/31/20 0050    Order Status: Completed Specimen: Stool Updated: 11/02/20 1123     Shiga Toxin 1 E.coli Negative     Shiga Toxin 2 E.coli Negative    E. coli 0157 antigen [[id number]] Collected: 10/31/20 1715    Order Status: Completed Specimen: Stool Updated: 11/02/20 1108     Shiga Toxin 1 E.coli Negative     Shiga Toxin 2 E.coli Negative    Narrative:      [id number]    C Diff Toxin by PCR [[id number]] Collected: 10/31/20 1715    Order Status: Completed Updated: 11/01/20 1440     C. diff PCR Negative    Narrative:      [id number]    Urine Culture High Risk [[id number]]  (Abnormal)  (Susceptibility) Collected: 10/29/20 1744    Order Status: Completed Specimen: Urine, Clean Catch Updated: 11/01/20 1350     Urine Culture, Routine PSEUDOMONAS AERUGINOSA  >100,000 cfu/ml      Narrative:      Indicated criteria for high risk culture:->Other  Other (specify):->transplant pt    Blood culture [[id number]]  (Abnormal)  (Susceptibility) Collected: 10/28/20 1728    Order Status: Completed Specimen: Blood Updated: 11/01/20 1050     Blood Culture, Routine Gram  stain aer bottle: Gram positive cocci in clusters resembling Staph       Results called to and read back by: Fifi Li, Charge Nurse       10/30/2020  19:25      STAPHYLOCOCCUS EPIDERMIDIS    Narrative:      DRAW FROM [facility]    Clostridium difficile EIA [[id number]]  (Abnormal) Collected: 10/31/20 1715    Order Status: Completed Specimen: Stool Updated: 11/01/20 0133     C. diff Antigen Positive     C difficile Toxins A+B, EIA Negative     Comment: Testing not recommended for children <24 months old.       Narrative:      [id number]    Urine culture [[id number]]  (Abnormal)  (Susceptibility) Collected: 10/28/20 1831    Order Status: Completed Specimen: Urine Updated: 10/31/20 0937     Urine Culture, Routine PSEUDOMONAS AERUGINOSA  > 100,000 cfu/ml      Narrative:      Specimen Source->Urine        Imaging Reviewed:   KUBThree round radiodensities overlie the left upper quadrant may represent ingested pills or be in the patient's clothing.  A radiodense thread is noted over the right lower quadrant of uncertain significance.   CXR clear   CT abdomen and pelvis 10/16 :    Findings consistent with severe proctocolitis.    Transplanted kidney right side of the pelvis with mild pelvocaliectasis and perinephric stranding nonspecific.  No calcified stones seen Additional findings as detailed above including cystic lesion with in the native right kidney versus dilatation of collecting structure of the native right kidney.  Stones in the native right kidney.  Cystic lesion within the pancreas  Cardiology:    IMPRESSION & PLAN   1. Severe C. Difficile colitis, improved from ICU status of septic shock, but still having diarrhea despite aggressive treatment     Prompted by oral antibiotics and/or chemotherapy   History of Cdiff 8/2019    REFRACTORY SEVERE PSEUDOMEMBRANOUS COLITIS WITH GENERALIZED COLONIC BLEEDING. S/p colectomy 11/1    2. PTLD, EBV related lymphoma, receiving chemotherapy      neutropenia, resolved  3. S/p  renal transplant on immunosuppression, DIVINE, refractory acidemia requiring initiation of dialysis 11/2  4. Chronic diarrhea  5. History of recurrent UTIs  6. Anorexia  7. Pseudomonas UTI 10/29(1st cx from urinal , second culture is CCMS)  8. GI bleeding 10/30  9. Blood culture with GPC 10/29, significance unknown    Recommendations:  Continue vanc, cefepime and flagyl, mycamine  Recommend reducing steroids further  Ok with me to remove rectal tube     Home med list needs to be adjusted to include prednisone. Wife will clarify 5 mg or 10 mg daily

## 2020-11-06 NOTE — ASSESSMENT & PLAN NOTE
Nutrition consulted. Body mass index is 27.64 kg/m².. Encourage maximal PO intake. Diet supplementation ordered per nutrition approval. Will encourage PO and monitor closely for weight changes.  We are supplementing calorie intake through parenteral nutrition; however, I would like to place TPN on pause while the patient is suffering with anasarca and third-spacing.

## 2020-11-06 NOTE — CHAPLAIN
Brief visit w/pt, introduction and pleasantries; PT needed to get in to work with him, so I exited and told him I'd come round again to visit. Lord, in your mercy.

## 2020-11-06 NOTE — PLAN OF CARE
Pt afebrile this shift. VSS. NPO. Barker to gravity. flexi seal to gravity. Edematous and 3rd spacing. Jeronimo Tolbert, and Yolanda saw pt this shift. Yolanda said ok to remove flexi seal. bilat arms with curlex dressings. Full chg bath given. Morphine for pain, see mar. RIO drains still draining dark brown/red drainage. 700cc's from right, 250cc's from left. Dressing change this am d/t saturated dressing. Wife updated this am. Platelets 52 this am- Dr. Guerrero aware, no new orders at this time. Safety maintained.

## 2020-11-07 PROBLEM — R00.0 TACHYCARDIA: Status: ACTIVE | Noted: 2020-01-01

## 2020-11-07 PROBLEM — Z90.49 S/P COLECTOMY: Status: ACTIVE | Noted: 2020-01-01

## 2020-11-07 NOTE — PROGRESS NOTES
"[facility]  Progress Note    Patient Name: Alin Burkett  MRN: [id number]  Patient Class: IP- Inpatient   Admission Date: 10/15/2020  Length of Stay: 23 days  Attending Physician: Julia Summers MD  Primary Care Provider: Carmen Krueger MD        Subjective:     Principal Problem:Acute renal failure superimposed on stage 3 chronic kidney disease        HPI:  Patient has been having diarrhea with "jelly-like" consistency as well as crampiness in the abdomen.  Symptoms began roughly one week ago.  Also has had fever and malaise.  Appetite poor.  Fatigue as well.  He has been receiving chemo for PTLD; most recent cycle of CHOP was end of last week.  He has history of renal transplant four years ago and is currently on twice-a-day  Prograf.  He's had no cough, no unusual headache, no sinus pain, and no burning on urination.  He feels that he's probably dehydrated.    Overview/Hospital Course:  No notes on file    Interval History: He is more alert and conversant than he has been in previous discussions.  Pt continues to have significant amounts of drainge from his RIO appears bilious. CT scan from yesterday  demonstrated enteric contrast in the abdominal cavity.     Review of Systems   Constitutional: Positive for appetite change. Negative for chills and fever.   HENT: Negative for congestion and ear pain.    Respiratory: Negative for cough, shortness of breath and wheezing.    Cardiovascular: Negative for chest pain and leg swelling.   Gastrointestinal: Positive for abdominal distention. Negative for nausea.   Genitourinary: Negative for flank pain.   Musculoskeletal: Negative for arthralgias and back pain.   Neurological: Negative for light-headedness.   Psychiatric/Behavioral: Negative for agitation.     Objective:     Vital Signs (Most Recent):  Temp: 98.4 °F (36.9 °C) (11/07/20 1230)  Pulse: 92 (11/07/20 1245)  Resp: 19 (11/07/20 1245)  BP: (!) 142/86 (11/07/20 1245)  SpO2: " 98 % (11/07/20 1245) Vital Signs (24h Range):  Temp:  [97.4 °F (36.3 °C)-98.4 °F (36.9 °C)] 98.4 °F (36.9 °C)  Pulse:  [] 92  Resp:  [0-29] 19  SpO2:  [90 %-100 %] 98 %  BP: (105-174)/() 142/86     Weight: 89 kg (196 lb 3.4 oz)  Body mass index is 27.37 kg/m².    Intake/Output Summary (Last 24 hours) at 11/7/2020 1505  Last data filed at 11/7/2020 1417  Gross per 24 hour   Intake 1500 ml   Output 5658 ml   Net -4158 ml      Physical Exam  Vitals signs reviewed.   Constitutional:       General: He is not in acute distress.     Appearance: He is not diaphoretic.   HENT:      Head: Normocephalic and atraumatic.      Mouth/Throat:      Mouth: Mucous membranes are moist.   Eyes:      General: No scleral icterus.        Right eye: No discharge.         Left eye: No discharge.   Neck:      Musculoskeletal: Normal range of motion.      Vascular: No JVD.   Cardiovascular:      Rate and Rhythm: Normal rate and regular rhythm.   Pulmonary:      Effort: Pulmonary effort is normal.      Breath sounds: Examination of the right-lower field reveals rales. Examination of the left-lower field reveals rales. Rales present.   Abdominal:      General: The ostomy site is clean. Bowel sounds are normal. There is no distension.      Palpations: Abdomen is soft.      Tenderness: There is no abdominal tenderness.      Comments: New surgical incision with dressing.  Two drains with serosanguinous liquid.  Small amount of fluid in left RIO.  Large amount in right.  Right RIO bulb has dark fluid; possibly biliary..   Musculoskeletal:      Right forearm: He exhibits edema.      Left forearm: He exhibits edema.      Right lower leg: Edema present.      Left lower leg: Edema present.   Skin:     General: Skin is warm.      Findings: No rash.   Neurological:      General: No focal deficit present.      Mental Status: He is alert.      Cranial Nerves: No cranial nerve deficit.         Significant Labs:   BMP:   Recent Labs   Lab  11/07/20  0506   *  120*   *  135*   K 4.8  4.8     101   CO2 23  23   BUN 42*  42*   CREATININE 2.7*  2.7*   CALCIUM 7.8*  7.8*   MG 1.7     CBC:   Recent Labs   Lab 11/06/20  0424 11/07/20  0506   WBC 25.82* 24.99*   HGB 8.9* 8.9*   HCT 27.0* 26.3*   PLT 52* 67*       Significant Imaging: I have reviewed all pertinent imaging results/findings within the past 24 hours.      Assessment/Plan:      * Acute renal failure superimposed on stage 3 chronic kidney disease  Creatinine high but stable..  Continue monitoring Cr.  Keep MAP > 55.  Nephrologist consultant started him on hemodialysis.    Lab Results   Component Value Date    CREATININE 2.7 (H) 11/07/2020    CREATININE 2.7 (H) 11/07/2020             S/P colectomy   S/p colectomy 11/1   Patient had REFRACTORY SEVERE PSEUDOMEMBRANOUS COLITIS WITH GENERALIZED COLONIC BLEEDING.         Thrombocytopenia  Was present on hospital day 1.  It improved but now, it's getting worse.  Will monitor.  He has received two units of platelets this admission.  He is on no anticoagulants.    Lab Results   Component Value Date    PLT 52 (L) 11/06/2020           Anemia in stage 3 chronic kidney disease  Hemoglobin   Date Value Ref Range Status   11/07/2020 8.9 (L) 14.0 - 18.0 g/dL Final   11/06/2020 8.9 (L) 14.0 - 18.0 g/dL Final   11/05/2020 9.0 (L) 14.0 - 18.0 g/dL Final   11/04/2020 9.7 (L) 14.0 - 18.0 g/dL Final   11/03/2020 6.3 (L) 14.0 - 18.0 g/dL Final     Patient has anemia of iron deficiency, CKD, and acute blood loss.  Monitor HGB.  Transfuse for HGB of < 7.5 g/dL.    Iron deficiency  Noted.  Will recheck iron levels at some point.      Sepsis  on steroids, white blood cells slightly increased.    Currently Day 9of cefepime for Pseudomonas UTI, vancomycin day 9 for coagulase-negative Staph in the blood, micafungin for prophylaxis while on TPN and after bowel surgery, and Flagyl for remnant of rectum at risk.   This patient does have evidence of  infective focus  My overall impression is sepsis. Vital signs were reviewed and noted in progress note.  Antibiotics given-   Antibiotics (From admission, onward)    Start     Stop Route Frequency Ordered    11/05/20 2115  mupirocin 2 % ointment      -- Top Daily 11/05/20 2114 11/04/20 0901  cefepime in dextrose 5 % 1 gram/50 mL IVPB 1 g      -- IV Every 24 hours (non-standard times) 11/03/20 1708    10/31/20 1900  metronidazole IVPB 500 mg  (C. difficile Infection (CDI) Treatment Order Panel)      -- IV Every 8 hours (non-standard times) 10/31/20 1751    10/30/20 2035  vancomycin - pharmacy to dose  (vancomycin IVPB)      -- IV pharmacy to manage frequency 10/30/20 1935        Cultures were taken-   Microbiology Results (last 7 days)     Procedure Component Value Units Date/Time    Aerobic culture [[id number]] Collected: 11/07/20 1430    Order Status: Sent Specimen: Wound from Abdomen Updated: 11/07/20 1431    AFB Culture & Smear [[id number]] Collected: 11/07/20 1430    Order Status: Sent Specimen: Wound from Abdomen Updated: 11/07/20 1431    Fungus culture [[id number]] Collected: 11/07/20 1430    Order Status: Sent Specimen: Wound from Abdomen Updated: 11/07/20 1431    Culture, Anaerobic [[id number]] Collected: 11/07/20 1430    Order Status: Sent Specimen: Wound from Abdomen Updated: 11/07/20 1431    Blood culture [[id number]] Collected: 10/30/20 2022    Order Status: Completed Specimen: Blood Updated: 11/05/20 0612     Blood Culture, Routine No growth after 5 days.    Narrative:      Change Woods needle and then draw blood culture from the  [facility]    Stool culture [[id number]] Collected: 10/31/20 1715    Order Status: Completed Specimen: Stool Updated: 11/04/20 1349     Stool Culture No Salmonella,Shigella,Vibrio,Campylobacter,Yersinia isolated.    Narrative:      [id number]    Stool culture [[id number]] Collected: 10/31/20 0050    Order Status: Completed Specimen: Stool Updated: 11/03/20 1438     Stool Culture No  Salmonella,Shigella,Vibrio,Campylobacter,Yersinia isolated.    E. coli 0157 antigen [[id number]] Collected: 10/31/20 0050    Order Status: Completed Specimen: Stool Updated: 11/02/20 1123     Shiga Toxin 1 E.coli Negative     Shiga Toxin 2 E.coli Negative    E. coli 0157 antigen [[id number]] Collected: 10/31/20 1715    Order Status: Completed Specimen: Stool Updated: 11/02/20 1108     Shiga Toxin 1 E.coli Negative     Shiga Toxin 2 E.coli Negative    Narrative:      [id number]    C Diff Toxin by PCR [[id number]] Collected: 10/31/20 1715    Order Status: Completed Updated: 11/01/20 1440     C. diff PCR Negative    Narrative:      [id number]    Urine Culture High Risk [[id number]]  (Abnormal)  (Susceptibility) Collected: 10/29/20 1744    Order Status: Completed Specimen: Urine, Clean Catch Updated: 11/01/20 1350     Urine Culture, Routine PSEUDOMONAS AERUGINOSA  >100,000 cfu/ml      Narrative:      Indicated criteria for high risk culture:->Other  Other (specify):->transplant pt    Blood culture [[id number]]  (Abnormal)  (Susceptibility) Collected: 10/28/20 1728    Order Status: Completed Specimen: Blood Updated: 11/01/20 1050     Blood Culture, Routine Gram stain aer bottle: Gram positive cocci in clusters resembling Staph       Results called to and read back by: Fifi Li, Charge Nurse       10/30/2020  19:25      STAPHYLOCOCCUS EPIDERMIDIS    Narrative:      DRAW FROM [facility]    Clostridium difficile EIA [[id number]]  (Abnormal) Collected: 10/31/20 1715    Order Status: Completed Specimen: Stool Updated: 11/01/20 0133     C. diff Antigen Positive     C difficile Toxins A+B, EIA Negative     Comment: Testing not recommended for children <24 months old.       Narrative:      [id number]        Latest lactate reviewed, they are-  No results for input(s): LACTATE in the last 72 hours.    Organ dysfunction indicated by Acute kidney injury, Encephalopathy, Acute respiratory failure and Acute liver injury          Moderate  malnutrition  Nutrition consulted. Body mass index is 27.64 kg/m².   Patient not able to have diet advanced just yet (since getting the TAC).  We're giving him parenteral nutrition.    Hypokalemia due to excessive gastrointestinal loss of potassium  Resolved.      C. difficile colitis  ID and GI services following.  Will resume metronidazole  Endoscopic exam of colon revealed mucosal abnormality c/w Cdiff and bleeding from throughout colon.  Patient underwent colectomy.   TAC and ileostomy took place on hospital day 17.  Patient continues on IV metronidazole.    Post-transplant lymphoproliferative disorder (PTLD)  Has been receiving chemo for this.      -donor kidney transplant  Continue Prograf but at reduced dose.  Trough level high.  I communicated by secure messaging with Dr. Mcclain, the patient's transplant nephrologist at [facility].  Her recommendation was to keep the trough level around 3 to 4.      Acquired hypothyroidism  Continue levothyroxine.    Hyperchloremic acidosis  Due to bicarb loss through GI tract.  Continue bicarb supplement orally (IV bicarb discontinued)  Bicarb level is improving.    CO2   Date Value Ref Range Status   2020 24 23 - 29 mmol/L Final         VTE Risk Mitigation (From admission, onward)         Ordered     IP VTE LOW RISK PATIENT  Once      10/15/20 2220                Discharge Planning   TRINH: 10/26/2020     Code Status: Full Code   Is the patient medically ready for discharge?: Yes    Reason for patient still in hospital (select all that apply): Patient trending condition and Treatment  Discharge Plan A: Long-term acute care facility (LTAC)            Critical care time spent on the evaluation and treatment of severe organ dysfunction, review of pertinent labs and imaging studies, discussions with consulting providers and discussions with patient/family: 60 minutes.      Julia Summers MD  Department of Hospital Medicine   [facility]

## 2020-11-07 NOTE — ASSESSMENT & PLAN NOTE
S/p colectomy 11/1   Patient had REFRACTORY SEVERE PSEUDOMEMBRANOUS COLITIS WITH GENERALIZED COLONIC BLEEDING.

## 2020-11-07 NOTE — ASSESSMENT & PLAN NOTE
Due to bicarb loss through GI tract.  Continue bicarb supplement orally (IV bicarb discontinued)  Bicarb level is improving.    CO2   Date Value Ref Range Status   11/06/2020 24 23 - 29 mmol/L Final

## 2020-11-07 NOTE — PT/OT/SLP PROGRESS
Physical Therapy      Patient Name:  Alin Burkett   MRN:  [id number]    Patient not seen today secondary to patient on dialysis. Will follow-up 11/8/2020.    Naif Sun, PT

## 2020-11-07 NOTE — SUBJECTIVE & OBJECTIVE
Interval History: He is more alert and conversant than he has been in previous discussions.  Pt continues to have significant amounts of drainge from his RIO appears bilious. CT scan from yesterday  demonstrated enteric contrast in the abdominal cavity.     Review of Systems   Constitutional: Positive for appetite change. Negative for chills and fever.   HENT: Negative for congestion and ear pain.    Respiratory: Negative for cough, shortness of breath and wheezing.    Cardiovascular: Negative for chest pain and leg swelling.   Gastrointestinal: Positive for abdominal distention. Negative for nausea.   Genitourinary: Negative for flank pain.   Musculoskeletal: Negative for arthralgias and back pain.   Neurological: Negative for light-headedness.   Psychiatric/Behavioral: Negative for agitation.     Objective:     Vital Signs (Most Recent):  Temp: 98.4 °F (36.9 °C) (11/07/20 1230)  Pulse: 92 (11/07/20 1245)  Resp: 19 (11/07/20 1245)  BP: (!) 142/86 (11/07/20 1245)  SpO2: 98 % (11/07/20 1245) Vital Signs (24h Range):  Temp:  [97.4 °F (36.3 °C)-98.4 °F (36.9 °C)] 98.4 °F (36.9 °C)  Pulse:  [] 92  Resp:  [0-29] 19  SpO2:  [90 %-100 %] 98 %  BP: (105-174)/() 142/86     Weight: 89 kg (196 lb 3.4 oz)  Body mass index is 27.37 kg/m².    Intake/Output Summary (Last 24 hours) at 11/7/2020 1505  Last data filed at 11/7/2020 1417  Gross per 24 hour   Intake 1500 ml   Output 5658 ml   Net -4158 ml      Physical Exam  Vitals signs reviewed.   Constitutional:       General: He is not in acute distress.     Appearance: He is not diaphoretic.   HENT:      Head: Normocephalic and atraumatic.      Mouth/Throat:      Mouth: Mucous membranes are moist.   Eyes:      General: No scleral icterus.        Right eye: No discharge.         Left eye: No discharge.   Neck:      Musculoskeletal: Normal range of motion.      Vascular: No JVD.   Cardiovascular:      Rate and Rhythm: Normal rate and regular rhythm.   Pulmonary:       Effort: Pulmonary effort is normal.      Breath sounds: Examination of the right-lower field reveals rales. Examination of the left-lower field reveals rales. Rales present.   Abdominal:      General: The ostomy site is clean. Bowel sounds are normal. There is no distension.      Palpations: Abdomen is soft.      Tenderness: There is no abdominal tenderness.      Comments: New surgical incision with dressing.  Two drains with serosanguinous liquid.  Small amount of fluid in left RIO.  Large amount in right.  Right RIO bulb has dark fluid; possibly biliary..   Musculoskeletal:      Right forearm: He exhibits edema.      Left forearm: He exhibits edema.      Right lower leg: Edema present.      Left lower leg: Edema present.   Skin:     General: Skin is warm.      Findings: No rash.   Neurological:      General: No focal deficit present.      Mental Status: He is alert.      Cranial Nerves: No cranial nerve deficit.         Significant Labs:   BMP:   Recent Labs   Lab 11/07/20  0506   *  120*   *  135*   K 4.8  4.8     101   CO2 23  23   BUN 42*  42*   CREATININE 2.7*  2.7*   CALCIUM 7.8*  7.8*   MG 1.7     CBC:   Recent Labs   Lab 11/06/20  0424 11/07/20  0506   WBC 25.82* 24.99*   HGB 8.9* 8.9*   HCT 27.0* 26.3*   PLT 52* 67*       Significant Imaging: I have reviewed all pertinent imaging results/findings within the past 24 hours.

## 2020-11-07 NOTE — BRIEF OP NOTE
[facility]  Brief Operative Note    SUMMARY     Surgery Date: 11/7/2020     Surgeon: Dr Deepa Guerrero    Assistant: Dr Ruben Goodrich        Pre-op Diagnosis:  Bowel perforation    Post-op Diagnosis:  Post-Op Diagnosis Codes:    Small bowel perforation    Procedure(s) (LRB):  LAPAROTOMY, EXPLORATORY (N/A)  Small bowel resection    Anesthesia: General    Description of Procedure: Exploratory laparotomy.  Lysis of adhesions.  Identification of ileal bowel perforation.  Small bowel resection with side to side anastomosis.     Description of the findings of the procedure: see rodney e.     Estimated Blood Loss: 200 mL    Estimated Blood Loss has been documented.         Specimens:   Specimen (12h ago, onward)    None          [id number]

## 2020-11-07 NOTE — PROGRESS NOTES
Progress Note  Infectious Disease    Reason for Consult:  C difficile colitis, neutropenia, sepsis    HPI: Alin Burkett is a   70 y.o. male who is status post renal transplant and is receiving chemotherapy for diffuse large B-cell lymphoma, presented to the emergency room on 10/15 with worsening of chronic diarrhea, abdominal cramps of 1 weeks duration.  He was found to be neutropenic with a white blood cell count of 0.2, volume depleted, hypokalemic, was cultured and given fluid resuscitation and vancomycin and cefepime.  He was admitted to the intensive care unit. He was recently given cefpodoxime to take prophylactically associated with his chemotherapy for recurring urinary tract infections.  Most recent positive urine culture was September 18th with E coli sensitive to 3rd generation cephalosporins carbapenems  He has had a hectic fever, stool for C difficile toxin was obtained and was resulted as positive today.  he has had a positive C difficile test before, August 2019.  White blood cells remain depressed at 0.1, platelets are depressed at 91,000, creatinine 1.6.  CT scan of the abdomen obtained last night shows severe proctocolitis without megacolon. Neupogen has been ordered. He is discouraged from eating by severe cramps.    10/17-10/30: notes please review previous    10/31: called to endoscopy lab to see wall to wall pseudomembranes, active generalized colonic bleeding. Patient required blood transfusion and volume expansion prior to the procedure. He will be moved to ICU after procedure. Discussed with Dr. Edwards and patient's wife and Dr. Decker that he should have a colectomy.   11/2: pt in OR on rounds yesterday. Spoke with wife and checked chart several times. Today acidemia required initiation of HD, trialysis placed, more blood given. Antibiotics simplified to Vanc IV (I think the one blood culture with CoNS from PAC was a contaminant), cefepime (for pseudomonas UTI) and IV flagyl. Also on  mycamine pending esophageal biopsies. Stopped oral vanc, vanc enemas, dificid and eravacycline last night.   11/3: interim reviewed, including Dr. Guerrero response to my question. Off pressors, on high dose solucortef. WBC down to 13, platelets 103. Wife reports that he was on prednisone daily at home(not on his home med list)  11/4:  Interim reviewed.  Alert able to cooperate, requiring small amount of oxygen, clear liquid diet started.  White blood cells higher likely from steroids, hemoglobin 9.7, platelets continue to drop at 79,000, dialysis given today.  No new imaging  11/5:  Interval reviewed, afebrile on steroids, white blood cells slightly increased.  Day 7 of cefepime for Pseudomonas UTI, day 7 for coagulase-negative Staph in the blood, micafungin for prophylaxis while on TPN and after bowel surgery, and Flagyl for remnant of rectum at risk.  Pathology of the GI biopsies noted.  Platelets are lower at 67.  No new imaging    11/06/2020.  Tired.  Afebrile.  Marked third-spacing throughout.  He will go for dialysis today.  General surgeon has os nurse to limit p.o. intake given that there is quite a lot of output from bilateral drains.  He also us to discontinue rectal tube which the nurse will do today.  Tolerating antibiotics well.  11/07/2020.afebrile Intubated sedated, on pressors. Patient is receiving  PRBCs. s/p exploratory abdominal surgery and wash out  Done bc of recent ct abd findings concerning about perforation. Perf could not be found?    Antibiotics (From admission, onward)    Start     Stop Route Frequency Ordered    11/05/20 2115  mupirocin 2 % ointment      -- Top Daily 11/05/20 2114    11/04/20 0901  cefepime in dextrose 5 % 1 gram/50 mL IVPB 1 g      -- IV Every 24 hours (non-standard times) 11/03/20 1708    10/31/20 1900  metronidazole IVPB 500 mg  (C. difficile Infection (CDI) Treatment Order Panel)      -- IV Every 8 hours (non-standard times) 10/31/20 1751    10/30/20 2035  vancomycin -  pharmacy to dose  (vancomycin IVPB)      -- IV pharmacy to manage frequency 10/30/20 1935        Antifungals (From admission, onward)    Start     Stop Route Frequency Ordered    11/01/20 1645  micafungin 100 mg in sodium chloride 0.9 % 100 mL IVPB (ready to mix system)      -- IV Every 24 hours (non-standard times) 11/01/20 1541        Antivirals (From admission, onward)        Stop Route Frequency     ganciclovir (CYTOVENE) injection      -- IV Every other day               EXAM & DIAGNOSTICS REVIEWED:   Vitals:     Temp:  [97 °F (36.1 °C)-97.7 °F (36.5 °C)]   Temp: 97.4 °F (36.3 °C) (11/06/20 1600)  Pulse: 68 (11/07/20 0245)  Resp: 16 (11/07/20 0651)  BP: (!) 144/89 (11/07/20 0245)  SpO2: 98 % (11/07/20 0245)    Intake/Output Summary (Last 24 hours) at 11/7/2020 0917  Last data filed at 11/7/2020 0600  Gross per 24 hour   Intake 900 ml   Output 4430 ml   Net -3530 ml       General:  Alert, appropriate, generally edematous. Intubated sedated  Eyes:   anicteric   ENT:   no oral lesions  Neck:  Supple   Lungs: Minimal Bibasilar crackles  Heart:  RRR, no gallop/murmur/rub noted  Abd:  Soft,    BS positive, ileostomy RLQ with minimal stools?, no guarding. 3 abd drains in place  :  Barker,no flank tenderness  Musc:  Joints without effusion, swelling, erythema, synovitis,    Skin:  No rashes.    Wound: Bandages not disturbed  Neuro: Alert, speech fluent, non focal  Psych:   calm cooperative appropriate   Extrem: generalized edema, no erythema, phlebitis, cellulitis, warm and well perfused  VAD:  portacath without redness, drainage, left IJ Trialysis cath    Isolation:  none         General Labs reviewed:  Recent Labs   Lab 11/05/20  0610 11/06/20  0424 11/07/20  0506   WBC 21.90* 25.82* 24.99*   HGB 9.0* 8.9* 8.9*   HCT 26.7* 27.0* 26.3*   PLT 67* 52* 67*       Recent Labs   Lab 11/02/20  0522  11/05/20  0610 11/06/20  1027 11/07/20  0506   *   < > 137 138 135*  135*   K 5.1   < > 3.7 4.6 4.8  4.8   *   <  > 104 103 101  101   CO2 <5*   < > 22* 24 23  23   BUN 38*   < > 37* 43* 42*  42*   CREATININE 2.4*   < > 2.7* 2.8* 2.7*  2.7*   CALCIUM 6.7*   < > 7.3* 7.5* 7.8*  7.8*   PROT 4.3*  --   --   --   --    BILITOT 0.8  --   --   --   --    ALKPHOS 41*  --   --   --   --    ALT 14  --   --   --   --    AST 47*  --   --   --   --     < > = values in this interval not displayed.     Micro:  Microbiology Results (last 7 days)     Procedure Component Value Units Date/Time    Blood culture [[id number]] Collected: 10/30/20 2022    Order Status: Completed Specimen: Blood Updated: 11/05/20 0612     Blood Culture, Routine No growth after 5 days.    Narrative:      Change Woods needle and then draw blood culture from the  portacath    Stool culture [[id number]] Collected: 10/31/20 1715    Order Status: Completed Specimen: Stool Updated: 11/04/20 1349     Stool Culture No Salmonella,Shigella,Vibrio,Campylobacter,Yersinia isolated.    Narrative:      [id number]    Stool culture [[id number]] Collected: 10/31/20 0050    Order Status: Completed Specimen: Stool Updated: 11/03/20 1438     Stool Culture No Salmonella,Shigella,Vibrio,Campylobacter,Yersinia isolated.    E. coli 0157 antigen [[id number]] Collected: 10/31/20 0050    Order Status: Completed Specimen: Stool Updated: 11/02/20 1123     Shiga Toxin 1 E.coli Negative     Shiga Toxin 2 E.coli Negative    E. coli 0157 antigen [[id number]] Collected: 10/31/20 1715    Order Status: Completed Specimen: Stool Updated: 11/02/20 1108     Shiga Toxin 1 E.coli Negative     Shiga Toxin 2 E.coli Negative    Narrative:      [id number]    C Diff Toxin by PCR [[id number]] Collected: 10/31/20 1715    Order Status: Completed Updated: 11/01/20 1440     C. diff PCR Negative    Narrative:      [id number]    Urine Culture High Risk [[id number]]  (Abnormal)  (Susceptibility) Collected: 10/29/20 7834    Order Status: Completed Specimen: Urine, Clean Catch Updated: 11/01/20 1350     Urine Culture, Routine PSEUDOMONAS  AERUGINOSA  >100,000 cfu/ml      Narrative:      Indicated criteria for high risk culture:->Other  Other (specify):->transplant pt    Blood culture [[id number]]  (Abnormal)  (Susceptibility) Collected: 10/28/20 1728    Order Status: Completed Specimen: Blood Updated: 11/01/20 1050     Blood Culture, Routine Gram stain aer bottle: Gram positive cocci in clusters resembling Staph       Results called to and read back by: Fifi Li, Charge Nurse       10/30/2020  19:25      STAPHYLOCOCCUS EPIDERMIDIS    Narrative:      DRAW FROM PORTACATH    Clostridium difficile EIA [[id number]]  (Abnormal) Collected: 10/31/20 1715    Order Status: Completed Specimen: Stool Updated: 11/01/20 0133     C. diff Antigen Positive     C difficile Toxins A+B, EIA Negative     Comment: Testing not recommended for children <24 months old.       Narrative:      [id number]        Imaging Reviewed:  CT 11/06  1. Recent colectomy with evidence of bowel perforation.  Small volume mixed density ascites suggests a component of hemorrhage. Two surgical drains in place.  2. Moderate-sized bilateral pleural effusions.  Basilar airspace disease is presumably atelectasis or less likely pneumonia.  3. Renal transplant without evidence for obstructive uropathy.  4. Retroperitoneal adenopathy with known PTLD per chart review.  Extent unchanged from recent comparison.  5. Worsened anasarca.     CT abdomen and pelvis 10/16 :    Findings consistent with severe proctocolitis.    Transplanted kidney right side of the pelvis with mild pelvocaliectasis and perinephric stranding nonspecific.  No calcified stones seen Additional findings as detailed above including cystic lesion with in the native right kidney versus dilatation of collecting structure of the native right kidney.  Stones in the native right kidney.  Cystic lesion within the pancreas  Cardiology:sr    IMPRESSION & PLAN   1. Severe C. Difficile colitis, improved from ICU status of septic shock, but still  having diarrhea despite aggressive treatment     Prompted by oral antibiotics and/or chemotherapy   History of Cdiff 8/2019    REFRACTORY SEVERE PSEUDOMEMBRANOUS COLITIS WITH GENERALIZED COLONIC BLEEDING. 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 PTLD, EBV related lymphoma, receiving chemotherapy      neutropenia, resolved  3. S/p renal transplant on immunosuppression, [facility], refractory acidemia requiring initiation of dialysis 11/2  4. Chronic diarrhea  5. History of recurrent UTIs  6. Anorexia  7. Pseudomonas UTI 10/29(1st cx from urinal , second culture is CCMS)  8. GI bleeding 10/30  9. Blood culture with GPC 10/29, significance unknown    Recommendations:  Continue vanc, cefepime and flagyl, mycamine  GAncyclovir since 11/06/2020 bc of esophageal bx findings  Unfortunately he is high risk for deterioration and death

## 2020-11-07 NOTE — PROGRESS NOTES
"INPATIENT NEPHROLOGY PROGRESS NOTE  [facility] NEPHROLOGY    Alin Burkett  11/07/2020    Reason for consultation:  Acute kidney injury     Chief Complaint   Patient presents with    Diarrhea     for the past 3 days,last received chemotherapy 6 days ago.     Abdominal Pain     History of Present Illness:  Patient has been having diarrhea with "jelly-like" consistency as well as crampiness in the abdomen. Symptoms began roughly one week ago.  Also has had fever and malaise.  Appetite poor.  Fatigue as well.  He has been receiving chemo for PTLD; most recent cycle of CHOP was end of last week.  He has history of renal transplant four years ago and is currently on twice-a-day  Prograf.  He's had no cough, no unusual headache, no sinus pain, and no burning on urination.     10/20  Has some diarrhea and abdominal pain.  No nausea, chest pain, sob, new neuro symptoms, new joint pain.  He is very weak.    I told him that he had previously been seen by doctor Shonda and I would call him to see him.  He stated he didn't want to see Dr Merchant and requested that I become his nephrologist.    10/21  Not much change in renal function since yesterday. UOP 1.3L.  Sleeping quietly.  10/23  Sleeping.  1550 urine output.    10/24 VSS, no new complains. Appreciate ID and Heme input.  10/25 VSS, no new complains. sCr is better.  10/26 VSS, no new complains. Labs are acceptable. Lethargic at times.  10/27 VSS, no new complains.  10/28 VSS, no new complains. Needs to eat bettter.  10/29 VSS, no new complains.   10/30 VSS, no new complains. Still having blood-tinged diarrhea, treated for c.diff. On TPN due to very poor oral intake. Dr. Malave will cover after 5pm today and over the weekend.  10/31 sleeping.  AFVSS   11/1  Sleeping.  Hypotensive.  Tm 100.5- went for ex lap, TAC with end ileostomy and lysis of adhesion, almost 1L blood loss, transfused post OP  11/2 afebrile, tachy, BP better this AM but remains on darlene, on 4L NC, UOP 1.7L " but slowed down this AM, Hb drop- to get blood transfusion again today- c/w bloody output, also c/w weeping edema, WBC spiked to 22K  11/3- had to terminate HD last night due to hypotension- VSS, BP better, on O2 NC; off levophed, darlene; on bicarb gtt; got albumin yest; got blood products; UOP 300cc; resp issues overnight, improved some with morphine  11/4- febrile, intermittent dejah, BP stable, on 4L NC, remains on bicarb gtt, UOP 250cc; mental status is much improved- able to have full conversation, discussed multiple health issues going on   11/5- afebrile, dejah, SBP > 150, on 4L NC, UOP 500cc  11/6- tolerated 2L UF yest; afebrile, dejah, intermittent spikes in BP, on 3L NC, UOP 500cc; working with PT- no new complaints, clearly edematous  11/7  CT w/small bowel perforation, GI recommending surgical exploration.  HD for UF due today.  Pt seen at 10:30, on dialysis, RN decreased goal to 2L d/t heart rhythm.  Started looking as though he would convert to Afib.  Daughter at bedside.  Pt may be transferred to [facility] today per bedside RN.     Plan of Care:    1. Septic/Hemorraghic shock in setting of C diff colitis s/p TAC 11/1 with end ileostomy and significant lysis of adhesion  - He is hemodynamically stable. He is on stress dose steroids. Off midodrine. Hb is stable.  - Still with high RIO drain output- gen surg is monitoring.     2. Acute kidney injury 2/2 c.diff colitis  - Due to high obligate intake, weeping edema, dyspnea, need for more blood products, and metabolic derangements (hyponatremia, hyperkalemia, refractory acidemia), he was initiated on RRT on 11/2.  - Ordered another HD/UF treatment for today (Sat).  - Dose meds for dialysis.  - No NSAIDs or IV contrast    3. Nongap metabolic acidosis likely combination of GI losses and renal tubular defect (urine pH inappropriately high)  - Acidosis is resolved- continue 40 bicarb bath and PO bicarb 1300mg BID.     4. Hypokalemia  - This is stable- continue  3K bath and oral KCl 40mEq BID.  - Check Mg level.    5. Hypocalcemia  - This persists- check vitamin D, PTH- may need to add oscal D.  - Use 3Ca bath.  - Continue calcitriol.    6. PTLD lymphoma, s/p renal transplant  7. Anemia- multifactorial  8. Thrombocytopenia  - Prograf trough elevated- change to 2mg q12 hours.   - Transfused 2 units of blood with HD on 11/3- trend Hb- holding so far- no acute transfusion needs.  - Will use NS to flush HD catheter. Dc'ed heparin.    8. Anasarca- net + 15L   - Ordered 2-3L UF today.  - Optimize nutrition.    His overall prognosis is poor.     Thank you for allowing us to participate in this patient's care. We will continue to follow.    Vital Signs:  Temp Readings from Last 3 Encounters:   11/06/20 97.4 °F (36.3 °C) (Axillary)   10/10/20 98.3 °F (36.8 °C) (Oral)   10/09/20 98.6 °F (37 °C)       Pulse Readings from Last 3 Encounters:   11/07/20 68   10/10/20 95   10/09/20 (!) 56       BP Readings from Last 3 Encounters:   11/07/20 (!) 144/89   10/10/20 102/63   10/09/20 115/76       Weight:  Wt Readings from Last 3 Encounters:   11/06/20 89 kg (196 lb 3.4 oz)   10/10/20 68.5 kg (151 lb)   10/09/20 66.4 kg (146 lb 6.2 oz)     Medications:  No current facility-administered medications on file prior to encounter.      Current Outpatient Medications on File Prior to Encounter   Medication Sig Dispense Refill    allopurinoL (ZYLOPRIM) 300 MG tablet Take 1 tablet (300 mg total) by mouth once daily. 30 tablet 2    aspirin (ECOTRIN) 81 MG EC tablet Take 1 tablet (81 mg total) by mouth once daily.  0    calcitRIOL (ROCALTROL) 0.5 MCG Cap Take 1 capsule (0.5 mcg total) by mouth once daily. 30 capsule 11    cefpodoxime (VANTIN) 100 MG tablet Take 1 tablet (100 mg total) by mouth every 12 (twelve) hours. 60 tablet 3    COMBIGAN 0.2-0.5 % Drop Place 1 drop into both eyes 2 (two) times a day.       famotidine (PEPCID) 20 MG tablet TAKE 1 TABLET EVERY EVENING (Patient taking differently:  Take 20 mg by mouth every evening. ) 90 tablet 3    ketoconazole (NIZORAL) 200 mg Tab TAKE ONE-HALF (1/2) TABLET ONCE DAILY (Patient taking differently: Take 100 mg by mouth once daily. ) 45 tablet 3    levothyroxine (SYNTHROID) 100 MCG tablet TAKE 1 TABLET DAILY (Patient taking differently: Take 100 mcg by mouth before breakfast. Administer on an empty stomach at least 30 minutes before food. If receiving tube feeds, HOLD tube feeds for 1 hour before and after levothyroxine administration.) 90 tablet 3    magnesium oxide (MAG-OX) 400 mg (241.3 mg magnesium) tablet Take 1 tablet (400 mg total) by mouth once daily. 90 tablet 3    multivitamin (ONE DAILY MULTIVITAMIN) per tablet Take 1 tablet by mouth once daily.      ondansetron (ZOFRAN-ODT) 8 MG TbDL Dissolve 1 tablet (8 mg total) by mouth every 12 (twelve) hours as needed. (Patient taking differently: Take 8 mg by mouth every 12 (twelve) hours as needed (nausea). ) 21 tablet 1    predniSONE (DELTASONE) 50 MG Tab Take 2 tablets (100 mg total) by mouth once daily. Take on days 2-5 of your chemotherapy cycles. 8 tablet 0    sodium bicarbonate 650 MG tablet Take 1 tablet (650 mg total) by mouth 2 (two) times daily. 540 tablet 3    tacrolimus (PROGRAF) 1 MG Cap Take 3 capsules (3 mg total) by mouth every morning AND 2 capsules (2 mg total) every evening. Z94.0/Kidney Transplant on 11/26/16. 150 capsule 11    travoprost (TRAVATAN Z) 0.004 % ophthalmic solution Place 1 drop into both eyes every evening.        Scheduled Meds:   allopurinoL  100 mg Oral Daily    brimonidine-timoloL  1 drop Both Eyes Q12H    calcitRIOL  0.5 mcg Oral Daily    calcium gluc in NaCl, iso-osm  1 g Intravenous Once    ceFEPime (MAXIPIME) IVPB  1 g Intravenous Q24H    cholestyramine-aspartame  1 packet Oral TID    fat emulsion 20%  250 mL Intravenous Daily    ganciclovir (CYTOVENE) IVPB  1.25 mg/kg Intravenous Every other day    hydrocortisone sodium succinate  100 mg Intravenous  "Q6H    levothyroxine  50 mcg Intravenous Daily    metronidazole  500 mg Intravenous Q8H    micafungin (MYCAMINE) IVPB  100 mg Intravenous Q24H    mupirocin   Topical (Top) Daily    potassium chloride  40 mEq Oral BID    sodium bicarbonate  1,300 mg Oral BID    sucralfate  1 g Oral QID (AC & HS)    tacrolimus  2 mg Oral Daily PM    tacrolimus  2 mg Oral Daily AM    travoprost  1 drop Both Eyes QHS     Continuous Infusions:   sodium chloride 0.9% 10 mL/hr at 11/03/20 1800    Amino acid 4.25% - dextrose 5% (CLINIMIX-E) solution with additives (1L provides 42.5 gm AA, 50 gm CHO (170 kcal/L dextrose), Na 35, K 30, Mg 5, Ca 4.5, Acetate 70, Cl 39, Phos 15) 50 mL/hr at 11/06/20 2351    norepinephrine bitartrate-D5W Stopped (11/03/20 0043)    phenylephrine Stopped (11/02/20 2300)     PRN Meds:.sodium chloride, sodium chloride, sodium chloride, sodium chloride, sodium chloride, sodium chloride, albuterol-ipratropium, calcium gluconate IVPB, calcium gluconate IVPB, calcium gluconate IVPB, dicyclomine, hyoscyamine, magnesium sulfate IVPB, magnesium sulfate IVPB, metoclopramide HCl, morphine, potassium chloride in water **AND** potassium chloride in water **AND** potassium chloride in water, promethazine, sodium chloride 0.9%, sodium chloride 0.9%, sodium chloride 0.9%, Pharmacy to dose Vancomycin consult **AND** vancomycin - pharmacy to dose    Review of Systems:  Sleeping    Physical Exam:    BP (!) 144/89   Pulse 68   Temp 97.4 °F (36.3 °C) (Axillary)   Resp 16   Ht 5' 11" (1.803 m)   Wt 89 kg (196 lb 3.4 oz)   SpO2 98%   BMI 27.37 kg/m²     Constitutional: nad, ill appearing, appears stated age, aao x 3  Heart: rrr, no m/r/g, wwp, anasarca  Lungs: coarse, no w/r/r/c, no lb  Abdomen: s/nt/nd, +BS, + ostomy    Results:  Lab Results   Component Value Date     (L) 11/07/2020     (L) 11/07/2020    K 4.8 11/07/2020    K 4.8 11/07/2020     11/07/2020     11/07/2020    CO2 23 11/07/2020 "    CO2 23 11/07/2020    BUN 42 (H) 11/07/2020    BUN 42 (H) 11/07/2020    CREATININE 2.7 (H) 11/07/2020    CREATININE 2.7 (H) 11/07/2020    CALCIUM 7.8 (L) 11/07/2020    CALCIUM 7.8 (L) 11/07/2020    ANIONGAP 11 11/07/2020    ANIONGAP 11 11/07/2020    ESTGFRAFRICA 27 (A) 11/07/2020    ESTGFRAFRICA 27 (A) 11/07/2020    EGFRNONAA 23 (A) 11/07/2020    EGFRNONAA 23 (A) 11/07/2020       Lab Results   Component Value Date    CALCIUM 7.8 (L) 11/07/2020    CALCIUM 7.8 (L) 11/07/2020    PHOS 3.4 11/07/2020       Recent Labs   Lab 11/07/20  0506   WBC 24.99*   RBC 2.81*   HGB 8.9*   HCT 26.3*   PLT 67*   MCV 94   MCH 31.7*   MCHC 33.8       I have personally reviewed pertinent radiological imaging and reports.    [facility]  [address] [place], [place] [address] [address]  [phone] (p)  [phone] (f)

## 2020-11-07 NOTE — SUBJECTIVE & OBJECTIVE
Interval History:  Less anasarca.  Now getting parenteral nutrition.  No other major changes in condition.  No new complaints.    Review of Systems   Constitutional: Positive for appetite change. Negative for chills and fever.   Respiratory: Negative for cough, shortness of breath and wheezing.    Cardiovascular: Negative for chest pain and leg swelling.   Gastrointestinal: Positive for abdominal distention. Negative for nausea.     Objective:     Vital Signs (Most Recent):  Temp: 97.4 °F (36.3 °C) (11/06/20 1600)  Pulse: 65 (11/06/20 1930)  Resp: 16 (11/06/20 1940)  BP: (!) 138/96 (11/06/20 1930)  SpO2: 100 % (11/06/20 1930) Vital Signs (24h Range):  Temp:  [97 °F (36.1 °C)-98 °F (36.7 °C)] 97.4 °F (36.3 °C)  Pulse:  [53-77] 65  Resp:  [7-22] 16  SpO2:  [90 %-100 %] 100 %  BP: ()/() 138/96     Weight: 89.9 kg (198 lb 3.1 oz)  Body mass index is 27.64 kg/m².    Intake/Output Summary (Last 24 hours) at 11/6/2020 2225  Last data filed at 11/6/2020 2000  Gross per 24 hour   Intake 950 ml   Output 3895 ml   Net -2945 ml      Physical Exam  Vitals signs reviewed.   Constitutional:       General: He is not in acute distress.     Appearance: He is not diaphoretic.   Eyes:      General: No scleral icterus.        Right eye: No discharge.         Left eye: No discharge.   Neck:      Vascular: No JVD.   Cardiovascular:      Rate and Rhythm: Normal rate and regular rhythm.   Pulmonary:      Effort: Pulmonary effort is normal.      Breath sounds: Examination of the right-lower field reveals rales. Examination of the left-lower field reveals rales. Rales present.   Abdominal:      General: The ostomy site is clean. Bowel sounds are normal. There is no distension.      Palpations: Abdomen is soft.      Tenderness: There is no abdominal tenderness.      Comments: New surgical incision with dressing.  Two drains with serosanguinous liquid.  Small amount of fluid in left RIO.  Large amount in right.  Right RIO bulb has  dark fluid; possibly biliary..   Musculoskeletal:      Right forearm: He exhibits edema.      Left forearm: He exhibits edema.      Right lower leg: Edema present.      Left lower leg: Edema present.   Skin:     General: Skin is warm.      Findings: No rash.   Neurological:      Mental Status: He is alert.         Significant Labs: All pertinent labs within the past 24 hours have been reviewed.    Significant Imaging: I have reviewed all pertinent imaging results/findings within the past 24 hours.

## 2020-11-07 NOTE — ASSESSMENT & PLAN NOTE
Creatinine high but stable..  Continue monitoring Cr.  Keep MAP > 55.  Nephrologist consultant started him on hemodialysis.    Lab Results   Component Value Date    CREATININE 2.7 (H) 11/07/2020    CREATININE 2.7 (H) 11/07/2020

## 2020-11-07 NOTE — ASSESSMENT & PLAN NOTE
Was present on hospital day 1.  It improved but now, it's getting worse.  Will monitor.  He has received two units of platelets this admission.  He is on no anticoagulants.    Lab Results   Component Value Date    PLT 52 (L) 11/06/2020

## 2020-11-07 NOTE — NURSING
abd ct read: evidence of perforated bowel.   Called Dr. Guerrero and left message updating him.   Called Dr. Goodrich: TORB as follows- Keep pt NPO through night, monitor pt for any changes, call back if need. As long as pt is stable, surgery 11/7/20.   Will continue to monitor pt closely.   NP aware.   Lab drawing type and screen at this time. Blood consent verified and located in pt physical chart. Pt remains stable at this time.

## 2020-11-07 NOTE — ANESTHESIA POSTPROCEDURE EVALUATION
Anesthesia Post Evaluation    Patient: Alin Burkett    Procedure(s) Performed: Procedure(s) (LRB):  LAPAROTOMY, EXPLORATORY (N/A)    Final Anesthesia Type: general    Patient location during evaluation: ICU  Patient participation: No - Unable to Participate, Intubation  Level of consciousness: sedated  Post-procedure vital signs: reviewed and stable  Pain management: adequate  Airway patency: patent    PONV status at discharge: No PONV  Anesthetic complications: no      Cardiovascular status: blood pressure returned to baseline and hypotensive  Respiratory status: ETT and ventilator  Hydration status: euvolemic  Follow-up not needed.          Vitals Value Taken Time   BP 86/60 11/07/20 1638   Temp 35.8 °C (96.44 °F) 11/07/20 1557   Pulse 103 11/07/20 1639   Resp 23 11/07/20 1639   SpO2 92 % 11/07/20 1639   Vitals shown include unvalidated device data.      No case tracking events are documented in the log.      Pain/Raheem Score: Pain Rating Prior to Med Admin: 8 (11/7/2020  6:51 AM)  Pain Rating Post Med Admin: 0 (11/7/2020  8:00 AM)  Raheem Score: 10 (11/6/2020  8:00 AM)

## 2020-11-07 NOTE — ASSESSMENT & PLAN NOTE
Creatinine high but stable..  Continue monitoring Cr.  Keep MAP > 55.  Nephrologist consultant started him on hemodialysis.    Lab Results   Component Value Date    CREATININE 2.8 (H) 11/06/2020

## 2020-11-07 NOTE — PROGRESS NOTES
Pharmacokinetic Assessment Follow Up: IV Vancomycin    Vancomycin serum concentration assessment(s):    The random level was drawn correctly and can be used to guide therapy at this time. The measurement is above the desired definitive target range of 15 to 20 mcg/mL.    Vancomycin Regimen Plan:    Re-dose when the random level is less than 20 mcg/mL, next level to be drawn on 11/8/20 with AM labs.     Drug levels (last 3 results):  Recent Labs   Lab Result Units 11/05/20  0610 11/06/20  0423 11/07/20  0506   Vancomycin, Random ug/mL 18.1 19.1 21.0       Pharmacy will continue to follow and monitor vancomycin.    Please contact pharmacy at extension [phone] for questions regarding this assessment.    Thank you for the consult,   Woodrow Arnold       Patient brief summary:  Alin Burkett is a 70 y.o. male initiated on antimicrobial therapy with IV Vancomycin for treatment of bacteremia    The patient is being dosed post dialysis based on pre-HD levels.     Drug Allergies:   Review of patient's allergies indicates:  No Known Allergies    Actual Body Weight:   89 kg (196 lb 3.4 oz)    Renal Function:   Estimated Creatinine Clearance: 27.5 mL/min (A) (based on SCr of 2.7 mg/dL (H)).,     Dialysis Method (if applicable):  intermittent HD. HD PRN. Patient ordered to receive HD today, 11/7/20.     CBC (last 72 hours):  Recent Labs   Lab Result Units 11/05/20  0610 11/06/20  0424 11/07/20  0506   WBC K/uL 21.90* 25.82* 24.99*   Hemoglobin g/dL 9.0* 8.9* 8.9*   Hematocrit % 26.7* 27.0* 26.3*   Platelets K/uL 67* 52* 67*       Metabolic Panel (last 72 hours):  Recent Labs   Lab Result Units 11/05/20  0610 11/06/20  1027 11/07/20  0506   Sodium mmol/L 137 138 135*  135*   Potassium mmol/L 3.7 4.6 4.8  4.8   Chloride mmol/L 104 103 101  101   CO2 mmol/L 22* 24 23  23   Glucose mg/dL 100 109 120*  120*   BUN mg/dL 37* 43* 42*  42*   Creatinine mg/dL 2.7* 2.8* 2.7*  2.7*   Albumin g/dL  --  1.8* 1.8*   Magnesium mg/dL  --   1.6 1.7   Phosphorus mg/dL  --  3.6  3.6 3.4       Vancomycin Administrations:  vancomycin given in the last 96 hours                     vancomycin 500 mg in dextrose 5 % 100 mL IVPB (ready to mix system) (mg) 500 mg New Bag 11/06/20 1754    vancomycin 500 mg/100 mL IVPB 500 mg (mg) 500 mg New Bag 11/05/20 1748    vancomycin 500 mg in dextrose 5 % 100 mL IVPB (ready to mix system) (mg) 500 mg New Bag 11/03/20 2100                    Microbiologic Results:  Microbiology Results (last 7 days)       Procedure Component Value Units Date/Time    Blood culture [[id number]] Collected: 10/30/20 2022    Order Status: Completed Specimen: Blood Updated: 11/05/20 0612     Blood Culture, Routine No growth after 5 days.    Narrative:      Change Woods needle and then draw blood culture from the  portacat    Stool culture [[id number]] Collected: 10/31/20 1715    Order Status: Completed Specimen: Stool Updated: 11/04/20 1349     Stool Culture No Salmonella,Shigella,Vibrio,Campylobacter,Yersinia isolated.    Narrative:      [id number]    Stool culture [[id number]] Collected: 10/31/20 0050    Order Status: Completed Specimen: Stool Updated: 11/03/20 1438     Stool Culture No Salmonella,Shigella,Vibrio,Campylobacter,Yersinia isolated.    E. coli 0157 antigen [[id number]] Collected: 10/31/20 0050    Order Status: Completed Specimen: Stool Updated: 11/02/20 1123     Shiga Toxin 1 E.coli Negative     Shiga Toxin 2 E.coli Negative    E. coli 0157 antigen [[id number]] Collected: 10/31/20 1715    Order Status: Completed Specimen: Stool Updated: 11/02/20 1108     Shiga Toxin 1 E.coli Negative     Shiga Toxin 2 E.coli Negative    Narrative:      [id number]    C Diff Toxin by PCR [[id number]] Collected: 10/31/20 1715    Order Status: Completed Updated: 11/01/20 1440     C. diff PCR Negative    Narrative:      [id number]    Urine Culture High Risk [[id number]]  (Abnormal)  (Susceptibility) Collected: 10/29/20 1744    Order Status: Completed Specimen: Urine, Clean  Catch Updated: 11/01/20 1350     Urine Culture, Routine PSEUDOMONAS AERUGINOSA  >100,000 cfu/ml      Narrative:      Indicated criteria for high risk culture:->Other  Other (specify):->transplant pt    Blood culture [[id number]]  (Abnormal)  (Susceptibility) Collected: 10/28/20 1728    Order Status: Completed Specimen: Blood Updated: 11/01/20 1050     Blood Culture, Routine Gram stain aer bottle: Gram positive cocci in clusters resembling Staph       Results called to and read back by: Fifi Li, Charge Nurse       10/30/2020  19:25      STAPHYLOCOCCUS EPIDERMIDIS    Narrative:      DRAW FROM [facility]    Clostridium difficile EIA [[id number]]  (Abnormal) Collected: 10/31/20 1715    Order Status: Completed Specimen: Stool Updated: 11/01/20 0133     C. diff Antigen Positive     C difficile Toxins A+B, EIA Negative     Comment: Testing not recommended for children <24 months old.       Narrative:      [id number]    Urine culture [[id number]]  (Abnormal)  (Susceptibility) Collected: 10/28/20 1831    Order Status: Completed Specimen: Urine Updated: 10/31/20 0937     Urine Culture, Routine PSEUDOMONAS AERUGINOSA  > 100,000 cfu/ml      Narrative:      Specimen Source->Urine

## 2020-11-07 NOTE — PLAN OF CARE
Patient to OR for abdominal exploratory surgery and wash out. Transported with O2, monitor and OR team.

## 2020-11-07 NOTE — RESPIRATORY THERAPY
11/07/20 0728   Patient Assessment/Suction   Level of Consciousness (AVPU) responds to voice   PRE-TX-O2   O2 Device (Oxygen Therapy) nasal cannula   $ Is the patient on Low Flow Oxygen? Yes   Flow (L/min) 4   SpO2 97 %   Pulse Oximetry Type Continuous   $ Pulse Oximetry - Multiple Charge Pulse Oximetry - Multiple   Pulse 65   Resp 19   BP (!) 163/103   Aerosol Therapy   $ Aerosol Therapy Charges PRN treatment not required

## 2020-11-07 NOTE — ASSESSMENT & PLAN NOTE
Hemoglobin   Date Value Ref Range Status   11/06/2020 8.9 (L) 14.0 - 18.0 g/dL Final   11/05/2020 9.0 (L) 14.0 - 18.0 g/dL Final   11/04/2020 9.7 (L) 14.0 - 18.0 g/dL Final   11/03/2020 6.3 (L) 14.0 - 18.0 g/dL Final   11/02/2020 6.7 (L) 14.0 - 18.0 g/dL Final     Patient has anemia of iron deficiency, CKD, and acute blood loss.  Monitor HGB.  Transfuse for HGB of < 7.5 g/dL.

## 2020-11-07 NOTE — ASSESSMENT & PLAN NOTE
Nutrition consulted. Body mass index is 27.64 kg/m².   Patient not able to have diet advanced just yet (since getting the TAC).  We're giving him parenteral nutrition.

## 2020-11-07 NOTE — PROGRESS NOTES
Patient called, she was in the walk-in clinic late November for back spasms and given cyclobenzaprine and meloxicam 15 mg a day. The medication has helped and she would like to get a refill. She has a history of Bansal's esophagitis on b.i.d. Nexium but the meloxicam caused no heartburn. She has severe lumbar spine stenosis and is contemplating lumbar spine surgery in February. She denies any dizziness or sedation from the cyclobenzaprine. Her last creatinine was normal.    We will give her 1 month refill of both medications. The cyclobenzaprine is a p.r.n. She will not take it and drive.     She seems to be tolerating them both well and they are helping her back pain. I told her if she is going to continue on those medications I want to see her at the end of the prescription for evaluation possible check of her kidney function. Also clarified, she is not taking the Naprosyn prescribed last April. She stated it did not work. This will be deleted from her med list. Dr. Castellano            Pt seen and examined.  He is more alert and conversant than he has been in previous discussions.  Pt continues to have significant amounts of drainge from his RIO.  CT scan from yesterday reviewed and demonstrates enteric contrast in the abdominal cavity.   Pt currently receiving dialysis.    Wt Readings from Last 3 Encounters:   11/06/20 89 kg (196 lb 3.4 oz)   10/10/20 68.5 kg (151 lb)   10/09/20 66.4 kg (146 lb 6.2 oz)     Temp Readings from Last 3 Encounters:   11/07/20 97.7 °F (36.5 °C)   10/10/20 98.3 °F (36.8 °C) (Oral)   10/09/20 98.6 °F (37 °C)     BP Readings from Last 3 Encounters:   11/07/20 109/76   10/10/20 102/63   10/09/20 115/76     Pulse Readings from Last 3 Encounters:   11/07/20 98   10/10/20 95   10/09/20 (!) 56     AAOx3  Coarse BS  Sinus  Soft/nd   RIO with significant amount of dark output  2+ pulses B    Lab Results   Component Value Date    WBC 24.99 (H) 11/07/2020    HGB 8.9 (L) 11/07/2020    HCT 26.3 (L) 11/07/2020    MCV 94 11/07/2020    PLT 67 (L) 11/07/2020       BMP  Lab Results   Component Value Date     (L) 11/07/2020     (L) 11/07/2020    K 4.8 11/07/2020    K 4.8 11/07/2020     11/07/2020     11/07/2020    CO2 23 11/07/2020    CO2 23 11/07/2020    BUN 42 (H) 11/07/2020    BUN 42 (H) 11/07/2020    CREATININE 2.7 (H) 11/07/2020    CREATININE 2.7 (H) 11/07/2020    CALCIUM 7.8 (L) 11/07/2020    CALCIUM 7.8 (L) 11/07/2020    ANIONGAP 11 11/07/2020    ANIONGAP 11 11/07/2020    ESTGFRAFRICA 27 (A) 11/07/2020    ESTGFRAFRICA 27 (A) 11/07/2020    EGFRNONAA 23 (A) 11/07/2020    EGFRNONAA 23 (A) 11/07/2020     Ct scan reviewed with radiology: demonstrates bowel injury with enteric contrast in the abdomen.      A/P: small bowel injury  Lengthy d/w pt and her family.  Pt with apparent bowel injury.  Pt having high output from RIO drain.  D/w them that it is my opinion that he needs exploratory surgery to wash out the abdomen and evaluate/treat injury to the bowel.  Pt  agrees to proceed with surgery.  Risks and benefits have been d/w pt, his wife and his daughter.  They agree to proceed with surgery.  They acknowledge an awareness of his poor overall long term prognosis.

## 2020-11-07 NOTE — ASSESSMENT & PLAN NOTE
on steroids, white blood cells slightly increased.    Currently Day 9of cefepime for Pseudomonas UTI, vancomycin day 9 for coagulase-negative Staph in the blood, micafungin for prophylaxis while on TPN and after bowel surgery, and Flagyl for remnant of rectum at risk.   This patient does have evidence of infective focus  My overall impression is sepsis. Vital signs were reviewed and noted in progress note.  Antibiotics given-   Antibiotics (From admission, onward)    Start     Stop Route Frequency Ordered    11/05/20 2115  mupirocin 2 % ointment      -- Top Daily 11/05/20 2114 11/04/20 0901  cefepime in dextrose 5 % 1 gram/50 mL IVPB 1 g      -- IV Every 24 hours (non-standard times) 11/03/20 1708    10/31/20 1900  metronidazole IVPB 500 mg  (C. difficile Infection (CDI) Treatment Order Panel)      -- IV Every 8 hours (non-standard times) 10/31/20 1751    10/30/20 2035  vancomycin - pharmacy to dose  (vancomycin IVPB)      -- IV pharmacy to manage frequency 10/30/20 1935        Cultures were taken-   Microbiology Results (last 7 days)     Procedure Component Value Units Date/Time    Aerobic culture [[id number]] Collected: 11/07/20 1430    Order Status: Sent Specimen: Wound from Abdomen Updated: 11/07/20 1431    AFB Culture & Smear [[id number]] Collected: 11/07/20 1430    Order Status: Sent Specimen: Wound from Abdomen Updated: 11/07/20 1431    Fungus culture [[id number]] Collected: 11/07/20 1430    Order Status: Sent Specimen: Wound from Abdomen Updated: 11/07/20 1431    Culture, Anaerobic [[id number]] Collected: 11/07/20 1430    Order Status: Sent Specimen: Wound from Abdomen Updated: 11/07/20 1431    Blood culture [[id number]] Collected: 10/30/20 2022    Order Status: Completed Specimen: Blood Updated: 11/05/20 0612     Blood Culture, Routine No growth after 5 days.    Narrative:      Change Woods needle and then draw blood culture from the  portacath    Stool culture [[id number]] Collected: 10/31/20 1715    Order  Status: Completed Specimen: Stool Updated: 11/04/20 1349     Stool Culture No Salmonella,Shigella,Vibrio,Campylobacter,Yersinia isolated.    Narrative:      [id number]    Stool culture [[id number]] Collected: 10/31/20 0050    Order Status: Completed Specimen: Stool Updated: 11/03/20 1438     Stool Culture No Salmonella,Shigella,Vibrio,Campylobacter,Yersinia isolated.    E. coli 0157 antigen [[id number]] Collected: 10/31/20 0050    Order Status: Completed Specimen: Stool Updated: 11/02/20 1123     Shiga Toxin 1 E.coli Negative     Shiga Toxin 2 E.coli Negative    E. coli 0157 antigen [[id number]] Collected: 10/31/20 1715    Order Status: Completed Specimen: Stool Updated: 11/02/20 1108     Shiga Toxin 1 E.coli Negative     Shiga Toxin 2 E.coli Negative    Narrative:      [id number]    C Diff Toxin by PCR [[id number]] Collected: 10/31/20 1715    Order Status: Completed Updated: 11/01/20 1440     C. diff PCR Negative    Narrative:      [id number]    Urine Culture High Risk [[id number]]  (Abnormal)  (Susceptibility) Collected: 10/29/20 1744    Order Status: Completed Specimen: Urine, Clean Catch Updated: 11/01/20 1350     Urine Culture, Routine PSEUDOMONAS AERUGINOSA  >100,000 cfu/ml      Narrative:      Indicated criteria for high risk culture:->Other  Other (specify):->transplant pt    Blood culture [[id number]]  (Abnormal)  (Susceptibility) Collected: 10/28/20 1728    Order Status: Completed Specimen: Blood Updated: 11/01/20 1050     Blood Culture, Routine Gram stain aer bottle: Gram positive cocci in clusters resembling Staph       Results called to and read back by: Fifi Li, Charge Nurse       10/30/2020  19:25      STAPHYLOCOCCUS EPIDERMIDIS    Narrative:      DRAW FROM [facility]    Clostridium difficile EIA [[id number]]  (Abnormal) Collected: 10/31/20 1715    Order Status: Completed Specimen: Stool Updated: 11/01/20 0133     C. diff Antigen Positive     C difficile Toxins A+B, EIA Negative     Comment: Testing not recommended  for children <24 months old.       Narrative:      [id number]        Latest lactate reviewed, they are-  No results for input(s): LACTATE in the last 72 hours.    Organ dysfunction indicated by Acute kidney injury, Encephalopathy, Acute respiratory failure and Acute liver injury

## 2020-11-07 NOTE — ASSESSMENT & PLAN NOTE
Continue Prograf but at reduced dose.  Trough level high.  I communicated by secure messaging with Dr. Mcclain, the patient's transplant nephrologist at [facility].  Her recommendation was to keep the trough level around 3 to 4.

## 2020-11-07 NOTE — OP NOTE
Date of surgery:  November 11, 2020    Staff surgeon:  Dr. Roger Guerrero    Assistant:  Dr. Ruben Goodrich    Preoperative diagnosis:  Bowel perforation     Postoperative diagnosis:  Small bowel perforation    Procedure:  Exploratory laparotomy  Small-bowel resection    Anesthesia:  General endotracheal anesthesia     Blood loss:  200 cc    Indication for procedure:    Critically ill 70-year-old gentleman with multiple medical problems.  Patient with history of kidney transplant has PTLD following.  He was in the midst of chemotherapy and developed severe Clostridium difficile colitis.  He was refractory to medical treatment and developed significant hemorrhagic colitis.  Patient required emergent surgery last week and was critically unstable throughout surgery.  Patient had significant drain output following surgery.  Although hemodynamically stable drain output continued leading to a CT scan yesterday demonstrating findings consistent with injury to the small bowel.  There was enteric contrast noted outside of the bowel.  Patient was scheduled for exploratory laparotomy today.    Description of procedure:      Following signing informed consent patient taken the operating room placed supine position.  General endotracheal anesthesia was administered without event.  Patient already had Barker catheter in place.  The abdomen is prepped and draped standard fashion and appropriate time-out procedure was performed.  Having prepped and draped the abdomen staples were removed in the midline skin incision was opened.  There was a minimal amount of succus draining from the lower midline incision.  Fascial sutures were released and  the abdominal cavity was entered uneventfully.  Entering the abdominal cavity there is a moderate amount of old blood appreciated along the pericolic gutters and into the pelvis.  There was obviously enteric contents present.  The abdomen was irrigated copiously and the bowel was run.  I do take down  adhesions to the anterior abdominal wall fully identify the gastrojejunostomy.  Take down adhesions to the liver and fully mobilize the duodenum.  Terminal ileum was identified entering to the ileostomy site.  I proceeded proximally along the small bowel attempting to identify a bowel injury.  There is no obvious bowel injury appreciated.  I carefully evaluate around the gastrojejunostomy and the duodenal limb.  The serosa of the bowel does contain bile staining but there is no obvious leak.  Next I have anesthesia place an OG tube.  I feed this tube into the gastro jejunostomy and clamped bowel proximally.  Saline is injected he through this approximately 120 cc.  I then milk the saline proximal and doing this I do identify a missed bowel injury along the mesenteric surface of the ileum.  This is approximately 4 in proximal to his anastomosis.  Rather than make an anastomosis is close to anastomosis decision was made to resect this area of the anastomosis.  In total approximately 6 in were stapled across and removed without event.  The mesentery was ligated with silk ties.  Next enterotomy was made in each limb and a side-to-side anastomosis was created uneventfully.  I irrigated sure adequate hemostasis.  Anastomosis take by milking fluid from proximal there is no evidence of a leak.  I irrigate the abdomen once again ensuring adequate hemostasis.  Surgicel powder was placed along the raw surfaces of the liver and left upper quadrant.  I next closed the fascia with a running looped PDS suture.  Skin incision is stapled shut.  Patient was taken back to the ICU in critical condition.  There were no immediate complications blood loss 200 cc

## 2020-11-07 NOTE — PROGRESS NOTES
Ct scan reviewed  Pt with apparent small bowel perforation.    SUspect that this was a missed bowel injury from surgery.  Pt remains hemodynamically stable with no significant changes overnight  I have d/w pt wife and have recommended and offered surgical exploration to assess and treat the injury.  D/w her that I am uncertain as to exactly where in the small intestine his injury is.  Pt wife notes she is going to hospital she will d/w her .  She notes that she has been trying to get pt transferred to [facility].  She has my cell phone and will call with her desires.

## 2020-11-07 NOTE — PROGRESS NOTES
"[facility] Medicine  Progress Note    Patient Name: lAin Burkett  MRN: [id number]  Patient Class: IP- Inpatient   Admission Date: 10/15/2020  Length of Stay: 22 days  Attending Physician: Brennan Decker MD  Primary Care Provider: Carmen Krueger MD        Subjective:     Principal Problem:Acute renal failure superimposed on stage 3 chronic kidney disease        HPI:  Patient has been having diarrhea with "jelly-like" consistency as well as crampiness in the abdomen.  Symptoms began roughly one week ago.  Also has had fever and malaise.  Appetite poor.  Fatigue as well.  He has been receiving chemo for PTLD; most recent cycle of CHOP was end of last week.  He has history of renal transplant four years ago and is currently on twice-a-day  Prograf.  He's had no cough, no unusual headache, no sinus pain, and no burning on urination.  He feels that he's probably dehydrated.    Overview/Hospital Course:  No notes on file    Interval History:  Less anasarca.  Now getting parenteral nutrition.  No other major changes in condition.  No new complaints.    Review of Systems   Constitutional: Positive for appetite change. Negative for chills and fever.   Respiratory: Negative for cough, shortness of breath and wheezing.    Cardiovascular: Negative for chest pain and leg swelling.   Gastrointestinal: Positive for abdominal distention. Negative for nausea.     Objective:     Vital Signs (Most Recent):  Temp: 97.4 °F (36.3 °C) (11/06/20 1600)  Pulse: 65 (11/06/20 1930)  Resp: 16 (11/06/20 1940)  BP: (!) 138/96 (11/06/20 1930)  SpO2: 100 % (11/06/20 1930) Vital Signs (24h Range):  Temp:  [97 °F (36.1 °C)-98 °F (36.7 °C)] 97.4 °F (36.3 °C)  Pulse:  [53-77] 65  Resp:  [7-22] 16  SpO2:  [90 %-100 %] 100 %  BP: ()/() 138/96     Weight: 89.9 kg (198 lb 3.1 oz)  Body mass index is 27.64 kg/m².    Intake/Output Summary (Last 24 hours) at 11/6/2020 9118  Last data filed at 11/6/2020 2000  Gross " per 24 hour   Intake 950 ml   Output 3895 ml   Net -2945 ml      Physical Exam  Vitals signs reviewed.   Constitutional:       General: He is not in acute distress.     Appearance: He is not diaphoretic.   Eyes:      General: No scleral icterus.        Right eye: No discharge.         Left eye: No discharge.   Neck:      Vascular: No JVD.   Cardiovascular:      Rate and Rhythm: Normal rate and regular rhythm.   Pulmonary:      Effort: Pulmonary effort is normal.      Breath sounds: Examination of the right-lower field reveals rales. Examination of the left-lower field reveals rales. Rales present.   Abdominal:      General: The ostomy site is clean. Bowel sounds are normal. There is no distension.      Palpations: Abdomen is soft.      Tenderness: There is no abdominal tenderness.      Comments: New surgical incision with dressing.  Two drains with serosanguinous liquid.  Small amount of fluid in left RIO.  Large amount in right.  Right RIO bulb has dark fluid; possibly biliary..   Musculoskeletal:      Right forearm: He exhibits edema.      Left forearm: He exhibits edema.      Right lower leg: Edema present.      Left lower leg: Edema present.   Skin:     General: Skin is warm.      Findings: No rash.   Neurological:      Mental Status: He is alert.         Significant Labs: All pertinent labs within the past 24 hours have been reviewed.    Significant Imaging: I have reviewed all pertinent imaging results/findings within the past 24 hours.      Assessment/Plan:      * Acute renal failure superimposed on stage 3 chronic kidney disease  Creatinine high but stable..  Continue monitoring Cr.  Keep MAP > 55.  Nephrologist consultant started him on hemodialysis.    Lab Results   Component Value Date    CREATININE 2.8 (H) 11/06/2020             Thrombocytopenia  Was present on hospital day 1.  It improved but now, it's getting worse.  Will monitor.  He has received two units of platelets this admission.  He is on no  anticoagulants.    Lab Results   Component Value Date    PLT 52 (L) 2020           Anemia in stage 3 chronic kidney disease  Hemoglobin   Date Value Ref Range Status   2020 8.9 (L) 14.0 - 18.0 g/dL Final   2020 9.0 (L) 14.0 - 18.0 g/dL Final   2020 9.7 (L) 14.0 - 18.0 g/dL Final   2020 6.3 (L) 14.0 - 18.0 g/dL Final   2020 6.7 (L) 14.0 - 18.0 g/dL Final     Patient has anemia of iron deficiency, CKD, and acute blood loss.  Monitor HGB.  Transfuse for HGB of < 7.5 g/dL.    Iron deficiency  Noted.  Will recheck iron levels at some point.      Moderate malnutrition  Nutrition consulted. Body mass index is 27.64 kg/m².   Patient not able to have diet advanced just yet (since getting the TAC).  We're giving him parenteral nutrition.    Hypokalemia due to excessive gastrointestinal loss of potassium  Resolved.      C. difficile colitis  ID and GI services following.  On multiple antimicrobials.  Endoscopic exam of colon revealed mucosal abnormality c/w Cdiff and bleeding from throughout colon.  ID consultant was concerned about persistent bleeding from mucosa and the refractory nature of the infection.  We consulted with CRS for colectomy.   TAC and ileostomy took place on hospital day 17.  Patient continues on IV metronidazole.    Post-transplant lymphoproliferative disorder (PTLD)  Has been receiving chemo for this.      -donor kidney transplant  Continue Prograf but at reduced dose.  Trough level high.  I communicated by secure messaging with Dr. Mcclain, the patient's transplant nephrologist at [facility].  Her recommendation was to keep the trough level around 3 to 4.      Acquired hypothyroidism  Continue levothyroxine.    Hyperchloremic acidosis  Due to bicarb loss through GI tract.  Continue bicarb supplement orally (IV bicarb discontinued)  Bicarb level is improving.    CO2   Date Value Ref Range Status   2020 24 23 - 29 mmol/L Final         VTE Risk Mitigation (From  admission, onward)         Ordered     IP VTE LOW RISK PATIENT  Once      10/15/20 2220                Discharge Planning   TRINH: 10/26/2020     Code Status: Full Code   Is the patient medically ready for discharge?: Yes    Reason for patient still in hospital (select all that apply): Patient trending condition, Laboratory test and Treatment  Discharge Plan A: Long-term acute care facility (LTAC)            Brennan Decker MD  Department of Hospital Medicine   [facility]

## 2020-11-07 NOTE — ANESTHESIA PREPROCEDURE EVALUATION
11/07/2020  Alin Burkett is a 70 y.o., male.    Anesthesia Evaluation    I have reviewed the Patient Summary Reports.    I have reviewed the Nursing Notes. I have reviewed the NPO Status.   I have reviewed the Medications.     Review of Systems  Anesthesia Hx:  Denies Family Hx of Anesthesia complications.   Denies Personal Hx of Anesthesia complications.   Hematology/Oncology:         -- Anemia: Hematology Comments: Acute and chronic anemia Current/Recent Cancer. chemotherapy  Oncology Comments: PTLD current Chemo     Cardiovascular:   Hypertension ECG has been reviewed. · Concentric left ventricular remodeling.  · Hyperdynamic left ventricular systolic function. The estimated ejection fraction is 85%.  · The left ventricular global longitudinal strain is -15%. reduced  · No wall motion abnormalities.  · Grade I (mild) left ventricular diastolic dysfunction consistent with impaired relaxation.  · Normal right ventricular systolic function.  · Mild right atrial enlargement.  · Mild mitral regurgitation.  · Mild tricuspid regurgitation.  · Normal central venous pressure (3 mmHg).  · The estimated PA systolic pressure is 36 mmHg.  · Systolic anterior motion of the anterior leaflet of the Mitral valve is noted.  · Since previous echo of 8/20/19, there is now evidence of FRANCISCO.   Pulmonary:   Sleep Apnea    Renal/:   Chronic Renal Disease, ESRD, Dialysis Kidney transplant   Hepatic/GI:   GI hemorrhage  C. Diff.  Diffuse neoplastic lesions throughout the colon   Endocrine:   Hypothyroidism        Physical Exam  General:  Well nourished    Airway/Jaw/Neck:  Airway Findings: Mouth Opening: Normal Tongue: Normal  General Airway Assessment: Adult, Good  Mallampati: II  Improves to II with phonation.  TM Distance: 4-6 cm  Jaw/Neck Findings:  Neck ROM: Normal ROM      Dental:  Dental Findings: In tact    Chest/Lungs:  Chest/Lungs Findings: Clear to auscultation, Normal Respiratory Rate     Heart/Vascular:  Heart Findings: Rate: Normal  Rhythm: Regular Rhythm  Sounds: Normal  Heart murmur: negative       Mental Status:  Mental Status Findings:  Cooperative, Alert and Oriented         Anesthesia Plan  Type of Anesthesia, risks & benefits discussed:  Anesthesia Type:  general  Patient's Preference:   Intra-op Monitoring Plan: standard ASA monitors  Intra-op Monitoring Plan Comments:   Post Op Pain Control Plan:   Post Op Pain Control Plan Comments:   Induction:   IV  Beta Blocker:  Patient is not currently on a Beta-Blocker (No further documentation required).       Informed Consent: Patient understands risks and agrees with Anesthesia plan.  Questions answered. Anesthesia consent signed with patient.  ASA Score: 4  emergent   Day of Surgery Review of History & Physical: I have interviewed and examined the patient. I have reviewed the patient's H&P dated:  There are no significant changes.  H&P update referred to the surgeon.  H&P completed by Anesthesiologist.   Anesthesia Plan Notes: Critical nature of surgical lesion and comorbid conditions discussed at length with patient and spouse as well as need for likely post-operative ventilator support.        Ready For Surgery From Anesthesia Perspective.

## 2020-11-07 NOTE — PLAN OF CARE
Pt Afebrile, VSS. Barker catheter to gravity, oliguric. Dialysis on day shift. RIO drains to right and left abd. Right drain outputs excessively. Mid line incision dressing with small drainage. Ostomy intact with minimal output. Pt very edematous with weeping. Hypoactive bowel sounds. Abx given per orders. Morphine 2mg/ml prn for pain. Full chg bath given, linen changed. Pt tolerated ok, increased pain- morphine given.

## 2020-11-07 NOTE — PLAN OF CARE
Pt remains in ICU. VSS at this time. No c/o nausea. Afebrile. NPO. Mid line incision dressing intact. RIO drains to bilateral upper abdomen, right drain is still excessively draining. Dark brown drainage noted. Barker catheter to gravity, oliguric. Ileostomy with small amount clear/green stool. Pt continues with 3rd spacing. Dsgs to bilat arm wounds. Morphine prn for pain. Pt had a few short burst episodes of tachycardia, a-fib noted on monitor but did not sustain. Spoke with Dr. Guerrero this am and updated on pt status. Unsure of plan at this time. TPN and Lipids infusing.   Despite pt status, remains with a cheery disposition.  Safety maintained.

## 2020-11-07 NOTE — ASSESSMENT & PLAN NOTE
ID and GI services following.  Will resume metronidazole  Endoscopic exam of colon revealed mucosal abnormality c/w Cdiff and bleeding from throughout colon.  Patient underwent colectomy.   TAC and ileostomy took place on hospital day 17.  Patient continues on IV metronidazole.

## 2020-11-07 NOTE — PLAN OF CARE
Pt afebrile. VSS, a&o, appropriate. Pt very edematous with weeping noted. Barker catheter to gravity, flexi seal to gravity. bilat abdominal RIO drains; right draining excessively. Mid line abd surgical incision. Small amount of drainage noted. Morphine 2mg/ml given prn for pain. abx given per orders. Ostomy with minimal output. Call light within reach. Safety maintained.

## 2020-11-07 NOTE — ASSESSMENT & PLAN NOTE
Hemoglobin   Date Value Ref Range Status   11/07/2020 8.9 (L) 14.0 - 18.0 g/dL Final   11/06/2020 8.9 (L) 14.0 - 18.0 g/dL Final   11/05/2020 9.0 (L) 14.0 - 18.0 g/dL Final   11/04/2020 9.7 (L) 14.0 - 18.0 g/dL Final   11/03/2020 6.3 (L) 14.0 - 18.0 g/dL Final     Patient has anemia of iron deficiency, CKD, and acute blood loss.  Monitor HGB.  Transfuse for HGB of < 7.5 g/dL.

## 2020-11-07 NOTE — TRANSFER OF CARE
"Anesthesia Transfer of Care Note    Patient: Alin Burkett    Procedure(s) Performed: Procedure(s) (LRB):  LAPAROTOMY, EXPLORATORY (N/A)    Patient location: ICU    Anesthesia Type: general    Transport from OR: Upon arrival to PACU/ICU, patient attached to ventilator and auscultated to confirm bilateral breath sounds and adequate TV. Continuous ECG monitoring in transport. Continuous SpO2 monitoring in transport    Post pain: adequate analgesia    Post assessment: no apparent anesthetic complications and tolerated procedure well    Post vital signs: stable    Level of consciousness: sedated    Nausea/Vomiting: no nausea/vomiting    Complications: none    Transfer of care protocol was followed      Last vitals:   Visit Vitals  BP (!) 142/86   Pulse 72   Temp 36.9 °C (98.4 °F)   Resp 11   Ht 5' 11" (1.803 m)   Wt 89 kg (196 lb 3.4 oz)   SpO2 98%   BMI 27.37 kg/m²     "

## 2020-11-07 NOTE — ANESTHESIA PROCEDURE NOTES
Intubation  Performed by: Vincenzo Millard CRNA  Authorized by: Michael Madrigal MD     Intubation:     Induction:  Intravenous    Intubated:  Postinduction    Attempts:  1    Attempted By:  CRNA    Method of Intubation:  Direct    Blade:  Nydia 3    Laryngeal View Grade: Grade IIA - cords partially seen      Difficult Airway Encountered?: No      Complications:  None    Airway Device:  Oral endotracheal tube    Airway Device Size:  7.5    Style/Cuff Inflation:  Cuffed (inflated to minimal occlusive pressure)    Tube secured:  23    Secured at:  The lips    Placement Verified By:  Capnometry    Complicating Factors:  None    Findings Post-Intubation:  BS equal bilateral

## 2020-11-08 NOTE — RESPIRATORY THERAPY
11/07/20 1559   Patient Assessment/Suction   Level of Consciousness (AVPU) unresponsive   PRE-TX-O2   O2 Device (Oxygen Therapy) ventilator   $ Is the patient on Low Flow Oxygen? Yes   Oxygen Concentration (%) 40   SpO2 98 %   Pulse Oximetry Type Continuous   $ Pulse Oximetry - Multiple Charge Pulse Oximetry - Multiple   Pulse 105   Resp 16   Temp 96.4 °F (35.8 °C)   ETCO2   $ ETCO2 Charge Exhaled CO2 Monitoring   $ ETCO2 Usage Currently wearing   ETCO2 (mmHg) 32 mmHg   Wound Care   $ Wound Care Tech Time 15 min   Area of Concern Upper lip;Lower lip;Corner lip   Skin Color/Characteristics without discoloration   Skin Temperature warm        Airway - Non-Surgical 11/07/20 1420   Placement Date/Time: 11/07/20 (c) 1420   Method of Intubation: Direct laryngoscopy  Intubated: Postinduction  Blade: Nydia #3  Airway Device Size: 7.5  Cuff Inflation: Minimal occlusive pressure  Placement Verified By: Capnometry  Complicating Fac...   Secured at 24 cm   Measured At Lips   Secured Location Right   Secured by Pink tape;Other (Comment)  (Secured ETT with commercial tube [place])   Bite Block none   Site Condition Cool;Dry   Status Intact;Secured;Patent   Vent Select   Conventional Vent Y   Ventilator Initiated Yes   $ Ventilator Initial 1   Charged w/in last 24h YES   Preset Conventional Ventilator Settings   Vent Type    Ventilation Type VC   Vent Mode A/C   Humidity HME   Set Rate 10 BPM   Vt Set 550 mL   PEEP/CPAP 5 cmH20   Pressure Support 0 cmH20   Waveform RAMP   Peak Flow 60 L/min   Set Inspiratory Pressure 0 cmH20   Insp Time 0 Sec(s)   Plateau Set/Insp. Hold (sec) 0   Insp Rise Time  0 %   Trigger Sensitivity Flow/I-Trigger 3 L/min   P High 0 cm H2O   P Low 0 cm H2O   T High 0 sec   T Low 0 sec   Patient Ventilator Parameters   Resp Rate Total 17 br/min   Peak Airway Pressure 22 cmH2O   Mean Airway Pressure 10 cmH20   Plateau Pressure 0 cmH20   Exhaled Vt 558 mL   Total Ve 9.2 mL   Spont Ve 0 L   I:E Ratio  Measured 1:2.20   Conventional Ventilator Alarms   Ve High Alarm 32 L/min   Resp Rate High Alarm 40 br/min   Press High Alarm 50 cmH2O   Apnea Rate 10   Apnea Volume (mL) 620 mL   Apnea Oxygen Concentration  100   Apnea Flow Rate (L/min) 74   T Apnea 20 sec(s)   Ready to Wean/Extubation Screen   FIO2<=50 (chart decimal) 0.4   MV<16L (chart vol.) 9.2   PEEP <=8 (chart #) 5   Ready to Wean Parameters   F/VT Ratio<105 (RSBI) (!) 28.67

## 2020-11-08 NOTE — EICU
Called to the room to assist with pt having ekg changes and unable to obtain a cuff blood pressure. Pt lost pulse and Code blue was called.  Detailed charting completed in the code documentation flowsheet.

## 2020-11-08 NOTE — NURSING
Monitoring pt, went into wide complex tachycardia, CPR initiated per nursing staff, eICU notified and code blue called (1960). Dr. Vega again  Responded to code and at bedside.

## 2020-11-08 NOTE — ED PROVIDER NOTES
Code blue was called at approximately 1:05 a.m..  I responded immediately.  The patient was pulseless receiving CPR.  ABG was performed and revealed a potassium of 8.  The patient had a total colectomy today after he had a subtotal colectomy for pseudomembranous colitis.  He also has been getting dialysis daily.  Receive dialysis this morning.  He had wide complex tachycardia that responded well to multiple doses of hyperkalemia reducing agents including IV calcium, D50, insulin, bicarbonate, albuterol, IV fluids.  After several rounds of CPR and synchronized cardioversion for unstable V-tach we eventually regained a narrow complex sinus tachycardia.  When I was responding to the code I was never able to contact the general surgeon however Hospital Medicine followed up.  I did speak with the nephrologist insert hospital medicine.  Decision was made to get emergent dialysis.  Dialysis nurse was called in.  Unfortunately patient underwent cardiac arrest again at 2:56 a.m..  We perform CPR, gave him epinephrine, give him IV calcium insulin D50.  We were able to get wide complex rhythm with a pulse but eventually he developed fine VFib.  He was defibrillated and eventually became pulseless with a wide complex PEA unresponsive to further medicines for hyperkalemia.  Unfortunately patient passed away at 3:15 a.m..  I updated the family.      Vincenzo Vega MD  11/08/20 8561

## 2020-11-08 NOTE — PROGRESS NOTES
Hd: arrived at 0230 for emergent hd on pt, called from Hannah checking on arrival time. Informed I was in the hd room setting up and testing machine. She stated he was being coded. About to head to pt room at 0315 was informed pt .

## 2020-11-08 NOTE — PROGRESS NOTES
Responded to low BP  EKG possible MI / electrolyte issue  Urgent labs , PRBC   ABG reported 7.24  Subsequently needed CPR  Hyperkalemia reported  Spoke to ER MD performing code  Pressors if not better  Empiric PRBC till cause of low BP clear  Monitor labs closely   Stop LR and prn KCl  Known renal transplant; elevated creatinine  Postop ex lap with small bowel resection   Postop colectomy or c diff colitis  PTLD on chemo  EICU available to assist

## 2020-11-08 NOTE — HOSPITAL COURSE
Patient presented to the emergency room on 10/15 with worsening of chronic diarrhea, abdominal cramps  He was found to be neutropenic with a white blood cell count of 0.2, volume depleted, hypokalemic, was cultured and given fluid resuscitation and vancomycin and cefepime.  He was admitted to the intensive care unit. He was recently given cefpodoxime to take prophylactically associated with his chemotherapy for recurring urinary tract infections.  Most recent positive urine culture was September 18th with E coli sensitive to 3rd generation cephalosporins carbapenems  stool for C difficile toxin was obtained and was resulted as positive today.  he has had a positive C difficile test before, August 2019.  White blood cells remain depressed at 0.1, platelets are depressed at 91,000, creatinine 1.6.  CT scan of the abdomen obtained last night shows severe proctocolitis without megacolon. Neupogen was ordered.   tmax is improved. ANC improved on granix 0---1029) he wAs feeling much better today.  Less abdominal pain.  Less loose stools.  Decreased need for pressors  10/19/2020.  Afebrile.  T-max 99°.  Less need for pressors.  WBC 5.  Creatinine slightly KUB was done showed a increased diameter of the transverse colon.  Creatinine was worsening . Nephrologis started bicarb drip. 10/22: interim reviewed. Renal function slightly worse, bicarb corrected to 18 on drip. Urine output is poor and incontinent, midodrine started. Not eating.  Patient was taken off norepinephrine  Stools becoming more solid, 2 measured since 0 700, still having incontinence.  Able to do some physical therapy exercises but physical therapy recommended LTAC/rehab/SNF.  Was oxygenating well, sleeping and not eating, urine output is very good, creatinine improved, stools with improving form. Creatinine a little better. No appetite. Very irritable about being pushed to eat.  Patient was transferred to the floor was doing well but continued to have  diarrhea.  After moving to the floor, the team noticed Grossly bloody stools in flexiseal. hgb did drop from 9.2 to 8.2. EGD and FFS    Colonoscopy was done found to have wall to wall pseudomembrane.  Surgery was consulted and surgery performed Exploratory laparotomy with extensive lysis of adhesions Total abdominal colectomy with end ileostomy and Cholecystectomy  Small-bowel resection on November 1, 2020.  Patient had acidemia required initiation of HD, trialysis placed, more blood given. Antibiotics simplified to Vanc IV cefepime (for pseudomonas UTI) and IV flagyl. Also started on on mycamine pending esophageal biopsies.  Patient was on vanc enemas, dificid and eravacycline last night. Patient came of Off pressors, on high dose solucortef. Patient was continued on steroids, white blood cells slightly increased.  Continued on vanc and cefepime and micafungin for prophylaxis while on TPN and after bowel surgery, and Flagyl for remnant of rectum at risk.  Patient was found to have  Marked third-spacing throughout.   was getting regular hemo dialysis .  Pt continued to have significant amounts of drainge from his RIO appears bilious. CT scan from yesterday  demonstrated enteric contrast in the abdominal cavity.   On 11/07/2020, patient got hemodialysis in the morning potassium appear to be 4.7  Was  afebrile on pressors.  Patient underwent exploratory laparotomy and small bowel resection for Small bowel perforation.  Postoperatively patient was transferred back to ICU on vent, required sedation and levophed.  Postoperatively patient had critical H&H of 6.0 & 19.2 , 2 units of PRBCs was given.  On 11/08/2020 Code blue was called at approximately 1:05 a.m..  The cord was attended by the ER physician were documented that  The patient was pulseless receiving CPR.  ABG was performed and revealed a potassium of 8.  The patient had a total colectomy today after he had a subtotal colectomy for pseudomembranous colitis.  He  also has been getting dialysis daily.  Received dialysis in the morning.  He had wide complex tachycardia that responded well to multiple doses of hyperkalemia reducing agents including IV calcium, D50, insulin, bicarbonate, albuterol, IV fluids.  After several rounds of CPR and synchronized cardioversion for unstable V-tach we eventually regained a narrow complex sinus tachycardia.  Nephrologist was called  Decision was made to get emergent dialysis.  Dialysis nurse was called in.  Unfortunately patient underwent cardiac arrest again at 2:56 a.m..   another round of CPR was done,  epinephrine, IV calcium insulin D50 was given.   was found to have wide complex rhythm with a pulse but eventually he developed fine VFib.  He was defibrillated and eventually became pulseless with a wide complex PEA unresponsive to further medicines for hyperkalemia.   The patient passed away at 3:15 a.m.

## 2020-11-08 NOTE — NURSING
Pt noted to have a widening complex on ecg monitor, bp decreasing, HR increasing steadily. EKG obtained (1250), eICU called in for assistance. Pt then lost pulse, cpr initiated per nursing staff and code called (7077). Dr. Vega responded and ran code.

## 2020-11-08 NOTE — PLAN OF CARE
Plan of care reviewed with patient and family. Dialysis today, tolerated well. Patient went back to surgery for exploratory abdominal surgery and wash out, came back to ICU on vent, required sedation and levophed. Consulted pulmonary. Critical H&H of 6.0 & 19.2 called, 2 units of PRBCs ordered, 1 unit transfusing currently. Will recheck CBC in AM. Continue TPN, antibiotics administered as ordered. All po meds held today due to NPO for surgery. Abdominal dressing dry and intact. RIO x2 to bulb suction, prior to surgery, output was dark brown, post surgery output is dark red, and output has decreased. Safety maintained this shift.

## 2020-11-08 NOTE — SIGNIFICANT EVENT
Code Blue called overhead again - ER MD at the bedside running the code. eICU on camera.  ROSC achieved. Awaiting emergent HD therapy for hyperkalemia thought to be likely from PRBC transfusion from tonight.    While I was in the waiting room speaking with the family, patient lost pulse again and TOD was called per ER MD. Please see official code documentation.  Dr. Vega spoke with family regarding patient death. Patient's family requesting phone number for patient advocate.     Will notify attending MD of patient passing in the AM.

## 2020-11-08 NOTE — SIGNIFICANT EVENT
CODE BLUE called overhead - Dr. Vega at bedside. ROSC initially achieved. Nephrology contacted per nursing staff. Message also left for Dr. Guerrero per nursing staff. POCT potassium >8. On IV Levophed infusion prior to arrest. Amiodarone bolus and gtt initiated once ROSC achieved. eICU camera in room.  I personally spoke with patient's wife Jayden, regarding arrest, she states she is on her way to the hospital. Kathy, charge RN aware.      0140 - I personally spoke with Dr. Caldwell regarding patient case, he is updated on new ABG results. He states that he will call the dialysis nurse out for patient to be dialyzed tonight. He also states to repeat one amp of D50 and 10 units IV insulin. He also states to not administer any additional HC03 at this time.

## 2020-11-08 NOTE — EICU
S/P Code blue mostly from Hyperkalemic non AG MA from tubular and GI loss. Post op status ( ex lap-for SBO on 7 th, Cl diff + ), KTx -PTLD recent CHOP.    Nephrology coming in for emergent dialysis. K > 8. Received bicarb pushes on on drip now.    Sodium bicarb gtt ordered for now.

## 2020-11-08 NOTE — DISCHARGE SUMMARY
"[facility] Medicine  Discharge Summary      Patient Name: Alin Burkett  MRN: [id number]  Admission Date: 10/15/2020  Hospital Length of Stay: 24 days  Discharge Date and Time: 11/8/2020  7:00 AM  Attending Physician: No att. providers found   Discharging Provider: Julia Summers MD  Primary Care Provider: Carmen Krueger MD      HPI:   Patient has been having diarrhea with "jelly-like" consistency as well as crampiness in the abdomen.  Symptoms began roughly one week ago.  Also has had fever and malaise.  Appetite poor.  Fatigue as well.  He has been receiving chemo for PTLD; most recent cycle of CHOP was end of last week.  He has history of renal transplant four years ago and is currently on twice-a-day  Prograf.  He's had no cough, no unusual headache, no sinus pain, and no burning on urination.  He feels that he's probably dehydrated.    Procedure(s) (LRB):  LAPAROTOMY, EXPLORATORY (N/A)      Hospital Course:   Patient presented to the emergency room on 10/15 with worsening of chronic diarrhea, abdominal cramps  He was found to be neutropenic with a white blood cell count of 0.2, volume depleted, hypokalemic, was cultured and given fluid resuscitation and vancomycin and cefepime.  He was admitted to the intensive care unit. He was recently given cefpodoxime to take prophylactically associated with his chemotherapy for recurring urinary tract infections.  Most recent positive urine culture was September 18th with E coli sensitive to 3rd generation cephalosporins carbapenems  stool for C difficile toxin was obtained and was resulted as positive today.  he has had a positive C difficile test before, August 2019.  White blood cells remain depressed at 0.1, platelets are depressed at 91,000, creatinine 1.6.  CT scan of the abdomen obtained last night shows severe proctocolitis without megacolon. Neupogen was ordered.   tmax is improved. ANC improved on granix 0---1029) he wAs feeling " much better today.  Less abdominal pain.  Less loose stools.  Decreased need for pressors  10/19/2020.  Afebrile.  T-max 99°.  Less need for pressors.  WBC 5.  Creatinine slightly KUB was done showed a increased diameter of the transverse colon.  Creatinine was worsening . Nephrologis started bicarb drip. 10/22: interim reviewed. Renal function slightly worse, bicarb corrected to 18 on drip. Urine output is poor and incontinent, midodrine started. Not eating.  Patient was taken off norepinephrine  Stools becoming more solid, 2 measured since 0 700, still having incontinence.  Able to do some physical therapy exercises but physical therapy recommended LTAC/rehab/SNF.  Was oxygenating well, sleeping and not eating, urine output is very good, creatinine improved, stools with improving form. Creatinine a little better. No appetite. Very irritable about being pushed to eat.  Patient was transferred to the floor was doing well but continued to have diarrhea.  After moving to the floor, the team noticed Grossly bloody stools in flexiseal. hgb did drop from 9.2 to 8.2. EGD and FFS    Colonoscopy was done found to have wall to wall pseudomembrane.  Surgery was consulted and surgery performed Exploratory laparotomy with extensive lysis of adhesions Total abdominal colectomy with end ileostomy and Cholecystectomy  Small-bowel resection on November 1, 2020.  Patient had acidemia required initiation of HD, trialysis placed, more blood given. Antibiotics simplified to Vanc IV cefepime (for pseudomonas UTI) and IV flagyl. Also started on on mycamine pending esophageal biopsies.  Patient was on vanc enemas, dificid and eravacycline last night. Patient came of Off pressors, on high dose solucortef. Patient was continued on steroids, white blood cells slightly increased.  Continued on vanc and cefepime and micafungin for prophylaxis while on TPN and after bowel surgery, and Flagyl for remnant of rectum at risk.  Patient was found to  have  Marked third-spacing throughout.   was getting regular hemo dialysis .  Pt continued to have significant amounts of drainge from his RIO appears bilious. CT scan from yesterday  demonstrated enteric contrast in the abdominal cavity.   On 11/07/2020, patient got hemodialysis in the morning potassium appear to be 4.7  Was  afebrile on pressors.  Patient underwent exploratory laparotomy and small bowel resection for Small bowel perforation.  Postoperatively patient was transferred back to ICU on vent, required sedation and levophed.  Postoperatively patient had critical H&H of 6.0 & 19.2 , 2 units of PRBCs was given.  On 11/08/2020 Code blue was called at approximately 1:05 a.m..  The cord was attended by the ER physician were documented that  The patient was pulseless receiving CPR.  ABG was performed and revealed a potassium of 8.  The patient had a total colectomy today after he had a subtotal colectomy for pseudomembranous colitis.  He also has been getting dialysis daily.  Received dialysis in the morning.  He had wide complex tachycardia that responded well to multiple doses of hyperkalemia reducing agents including IV calcium, D50, insulin, bicarbonate, albuterol, IV fluids.  After several rounds of CPR and synchronized cardioversion for unstable V-tach we eventually regained a narrow complex sinus tachycardia.  Nephrologist was called  Decision was made to get emergent dialysis.  Dialysis nurse was called in.  Unfortunately patient underwent cardiac arrest again at 2:56 a.m..   another round of CPR was done,  epinephrine, IV calcium insulin D50 was given.   was found to have wide complex rhythm with a pulse but eventually he developed fine VFib.  He was defibrillated and eventually became pulseless with a wide complex PEA unresponsive to further medicines for hyperkalemia.   The patient passed away at 3:15 a.m.      Consults:   Consults (From admission, onward)        Status Ordering Provider     Inpatient  consult to Cardiology  Once     Provider:  Erica Lozano MD    Completed KARTHIK MORELAND     Inpatient consult to Colorectal Surgery  Once     Provider:  Roger Guerrero MD    Completed TIERA WRAY     Inpatient consult to Gastroenterology  Once     Provider:  Soraida Garcia MD    Completed ELAINE ACOSTA     Inpatient consult to Hematology  Once     Provider:  Cathy Stafford MD    Completed TIERA WRAY     Inpatient consult to Infectious Diseases  Once     Provider:  Lola Tolbert MD    Completed TIERA WRAY     Inpatient consult to Registered Dietitian/Nutritionist  Once     Provider:  (Not yet assigned)    Completed ELAINE ACOSTA     Inpatient consult to Social Work/Case Management  Once     Provider:  (Not yet assigned)    Completed TIERA WRAY     Inpatient consult to Social Work/Case Management  Once     Provider:  (Not yet assigned)    Completed ELAINE ACOSTA          No new Assessment & Plan notes have been filed under this hospital service since the last note was generated.  Service: Hospital Medicine    Final Active Diagnoses:    Diagnosis Date Noted POA    PRINCIPAL PROBLEM:  Acute renal failure superimposed on stage 3 chronic kidney disease [N17.9, N18.30] 2017 Yes    S/P colectomy [Z90.49] 2020 Not Applicable    Tachycardia [R00.0] 2020 Yes    Thrombocytopenia [D69.6] 2020 Yes    Iron deficiency [E61.1]  No    Anemia in stage 3 chronic kidney disease [N18.30, D63.1]  No    Sepsis [A41.9] 10/24/2020 Yes    Moderate malnutrition [E44.0] 10/20/2020 Yes    C. difficile colitis [A04.72] 10/15/2020 Yes    Hypokalemia due to excessive gastrointestinal loss of potassium [E87.6] 10/15/2020 Yes    Post-transplant lymphoproliferative disorder (PTLD) [T86.99, D47.Z1] 2020 Yes    -donor kidney transplant [Z94.0]  Not Applicable    Acquired hypothyroidism [E03.9] 01/10/2014 Yes    Hyperchloremic  acidosis [E87.2]  Yes      Problems Resolved During this Admission:    Diagnosis Date Noted Date Resolved POA    Septic shock [A41.9, R65.21]  2020 No    Lower GI bleeding [K92.2] 10/30/2020 2020 No    Febrile neutropenia [D70.9, R50.81] 10/15/2020 10/29/2020 Yes       Discharged Condition:     Disposition:     Follow Up:    Patient Instructions:   No discharge procedures on file.    Significant Diagnostic Studies: Labs:   BMP:   Recent Labs   Lab 20  0506 20  0139   *  120* 568*  568*   *  135* 133*  133*   K 4.8  4.8 7.3*  7.3*     101 103  103   CO2 23  23 11*  11*   BUN 42*  42* 37*  37*   CREATININE 2.7*  2.7* 2.9*  2.9*   CALCIUM 7.8*  7.8* 18.7*  18.7*   MG 1.7 2.3   , CBC   Recent Labs   Lab 20  1705  20  0139 20  0140   WBC 31.22*  --  37.46* 37.09*   HGB 6.0*  6.0*  --  7.6* 7.6*   HCT 19.2*   < > 24.7* 24.7*   *  --  99* 102*    < > = values in this interval not displayed.   , INR   Lab Results   Component Value Date    INR 1.2 2020    INR 1.1 10/30/2020    INR 0.9 2020   , Lipid Panel   Lab Results   Component Value Date    CHOL 150 2020    HDL 50 2020    LDLCALC 80.2 2020    TRIG 99 2020    CHOLHDL 33.3 2020   , Troponin   Recent Labs   Lab 20  0140   TROPONINI 0.066*    and A1C: No results for input(s): HGBA1C in the last 4320 hours.  Microbiology:   Blood Culture   Lab Results   Component Value Date    LABBLOO No growth after 5 days. 10/30/2020   , Sputum Culture No results found for: GSRESP, RESPIRATORYC, Urine Culture    Lab Results   Component Value Date    LABURIN PSEUDOMONAS AERUGINOSA  >100,000 cfu/ml   (A) 10/29/2020    and Wound Culture: negative    Pending Diagnostic Studies:     Procedure Component Value Units Date/Time    Basic metabolic panel [[id number]] Collected: 20 0423    Order Status: Sent Lab Status: In process Updated: 20  0424    Specimen: Blood     CMV DNA, Quantitative, PCR [[id number]] Collected: 20    Order Status: Sent Lab Status: In process Updated: 20    Specimen: Blood     Specimen to Pathology, Surgery General Surgery [[id number]] Collected: 20    Order Status: Sent Lab Status: In process Updated: 20         Medications:  None (patient  at medical facility)    Indwelling Lines/Drains at time of discharge:   Lines/Drains/Airways     Central Venous Catheter Line                 PowerPort A Cath Single Lumen 10/15/20 1502 left subclavian 24 days         Hemodialysis Catheter 20 left internal jugular 7 days          Drain                 Urethral Catheter 20 1330 Latex;Straight-tip 16 Fr. 7 days         Closed/Suction Drain 20 1636 Right RUQ Bulb 19 Fr. 6 days         Closed/Suction Drain 20 1637 Left LUQ Bulb 19 Fr. 6 days         Ileostomy 20 1615 Standard (Emmy, end) RUQ 6 days         NG/OG Tube 20 1613 [place] sump [place] mouth less than 1 day          Airway                 Airway - Non-Surgical 20 1420 1 day          Arterial Line            Arterial Line 20 0209 Right Radial less than 1 day                Time spent on the discharge of patient: 60 minutes  Patient was seen and examined on the date of discharge and determined to be suitable for discharge.    Critical care time spent on the evaluation and treatment of severe organ dysfunction, review of pertinent labs and imaging studies, discussions with consulting providers and discussions with patient/family: 60 minutes.     Julia Summers MD  Department of Hospital Medicine  [facility]

## 2020-11-08 NOTE — NURSING
Second unit of blood now transfusing. Ronaldo reynolds in use. VSS: 108/90 (96), HR 92, temp 95.9F.

## 2020-11-08 NOTE — PLAN OF CARE
11/07/20 1926   Patient Assessment/Suction   Level of Consciousness (AVPU) unresponsive   Respiratory Effort Normal;Unlabored   Expansion/Accessory Muscles/Retractions expansion symmetric   All Lung Fields Breath Sounds diminished;coarse   Rhythm/Pattern, Respiratory assisted mechanically   Cough Frequency no cough   PRE-TX-O2   O2 Device (Oxygen Therapy) ventilator   Oxygen Concentration (%) 60   SpO2 98 %   Pulse Oximetry Type Continuous   Pulse 90   Resp 18   Temp (!) 95.7 °F (35.4 °C)   ETCO2   $ ETCO2 Usage Currently wearing   ETCO2 (mmHg) 19 mmHg   Aerosol Therapy   $ Aerosol Therapy Charges Aerosol Treatment   Respiratory Treatment Status (SVN) given   Treatment Route (SVN) in-line   Patient Position (SVN) HOB elevated   Post Treatment Assessment (SVN) breath sounds unchanged   Signs of Intolerance (SVN) none   Breath Sounds Post-Respiratory Treatment   Post-treatment Heart Rate (beats/min) 89   Post-treatment Resp Rate (breaths/min) 18   Wound Care   $ Wound Care Tech Time 15 min   Area of Concern Upper lip;Lower lip   Skin Color/Characteristics without discoloration   Skin Temperature warm        Airway - Non-Surgical 11/07/20 1420   Placement Date/Time: 11/07/20 (c) 1420   Method of Intubation: Direct laryngoscopy  Intubated: Postinduction  Blade: Nydia #3  Airway Device Size: 7.5  Cuff Inflation: Minimal occlusive pressure  Placement Verified By: Capnometry  Complicating Fac...   Secured at 24 cm   Measured At Lips   Secured Location Center   Secured by Commercial tube [place]   Bite Block none   Site Condition Cool;Dry   Status Intact;Secured   Site Assessment Clean   Vent Select   Conventional Vent Y   Charged w/in last 24h YES   Preset Conventional Ventilator Settings   Vent Type    Ventilation Type VC   Vent Mode A/C   Humidity HME   Set Rate 16 BPM   Vt Set 550 mL   PEEP/CPAP 5 cmH20   Pressure Support 0 cmH20   Waveform RAMP   Peak Flow 60 L/min   Set Inspiratory Pressure 0 cmH20   Insp  Time 0 Sec(s)   Plateau Set/Insp. Hold (sec) 0   Insp Rise Time  0 %   Trigger Sensitivity Flow/I-Trigger 3 L/min   P High 0 cm H2O   P Low 0 cm H2O   T High 0 sec   T Low 0 sec   Patient Ventilator Parameters   Resp Rate Total 17 br/min   Peak Airway Pressure 26 cmH2O   Mean Airway Pressure 11 cmH20   Plateau Pressure 0 cmH20   Exhaled Vt 1185 mL   Total Ve 19.9 mL   Spont Ve 0 L   I:E Ratio Measured 1:2.60   Conventional Ventilator Alarms   Ve High Alarm 32 L/min   Resp Rate High Alarm 40 br/min   Press High Alarm 50 cmH2O   Apnea Rate 10   Apnea Volume (mL) 620 mL   Apnea Oxygen Concentration  100   Apnea Flow Rate (L/min) 74   T Apnea 20 sec(s)   Ready to Wean/Extubation Screen   FIO2<=50 (chart decimal) (!) 0.6   MV<16L (chart vol.) (!) 19.9   PEEP <=8 (chart #) 5   Ready to Wean Parameters   F/VT Ratio<105 (RSBI) (!) 15.19

## 2020-11-09 LAB — PATH REV BLD -IMP: NORMAL

## 2020-11-09 NOTE — PLAN OF CARE
20 0849   Final Note   Assessment Type Final Discharge Note   Anticipated Discharge Disposition

## 2020-11-10 LAB
BLD PROD TYP BPU: NORMAL
BLOOD UNIT EXPIRATION DATE: NORMAL
BLOOD UNIT TYPE CODE: 1700
BLOOD UNIT TYPE CODE: 7300
BLOOD UNIT TYPE: NORMAL
CMV DNA SERPL NAA+PROBE-ACNC: 1260 IU/ML
CODING SYSTEM: NORMAL
DISPENSE STATUS: NORMAL
NUM UNITS TRANS PACKED RBC: NORMAL

## 2020-11-12 LAB
FINAL PATHOLOGIC DIAGNOSIS: NORMAL
GROSS: NORMAL
Lab: NORMAL

## 2021-02-11 RX ORDER — PREDNISONE 5 MG/1
TABLET ORAL
Qty: 90 TABLET | Refills: 3 | OUTPATIENT
Start: 2021-02-11

## 2021-02-11 RX ORDER — METOPROLOL TARTRATE 25 MG/1
TABLET, FILM COATED ORAL
Qty: 90 TABLET | Refills: 3 | OUTPATIENT
Start: 2021-02-11

## 2021-02-11 RX ORDER — FAMOTIDINE 20 MG/1
TABLET, FILM COATED ORAL
Qty: 90 TABLET | Refills: 3 | OUTPATIENT
Start: 2021-02-11

## 2021-02-11 RX ORDER — LEVOTHYROXINE SODIUM 100 UG/1
TABLET ORAL
Qty: 90 TABLET | Refills: 3 | OUTPATIENT
Start: 2021-02-11

## 2021-03-22 NOTE — ASSESSMENT & PLAN NOTE
Nutrition consulted. Body mass index is 25.67 kg/m².. Encourage maximal PO intake. Diet supplementation ordered per nutrition approval. Will encourage PO and monitor closely for weight changes.     ROS as per HPI, rest 10pt ROS reviewed and negative

## 2021-08-19 NOTE — PROGRESS NOTES
Name: Alin Burkett  Medical Record Number: [id number]  Date of Service: 2017  Note By: Deb Gardner MD    RENAL TRANSPLANT PROGRESS NOTE      Reason for Follow-up: Reassessment of kidney transplant recipient and management of immunosuppression.    Summary: 66 y.o. male with history of ESRD secondary to decreased renal mass (congenitally absent left kidney) and HTN who was on dialysis since 6/16/10 until receiving PHS increased risk DDRT (pumped kidney, Campath induction, KDPI 36%, WIT 30 minutes, CIT 14 hours and 14 minutes, donor RPR positive thus patient completed PCN x3, CMV D+/R+) on 16 with corina Cr since transplant being 1.3 on 16 but noted to have DIVINE on outpatient labs up to 2.6 on 17 and was admitted for further work-up however he had improvement in Cr without intervention. He did have biopsy performed on 17 which resulted with 21 glomeruli, none globally sclerosed, <5% interstitial fibrosis, no ACR, c4d negative, AVR CCT Type 2 (V1 lesion) thus he was re-admitted on 17 for treatment of AVR. Unable to place central line thus he is receiving THYMO via peripheral IV.    Interval History: Patient states he feels well. Afebrile. Tolerated THYMO dose #3 yesterday without complications.     PMHx:  Past Medical History   Diagnosis Date    Acidosis     Adrenal adenoma     Anemia associated with chronic renal failure     Arrhythmia, onset 2015    Awaiting organ transplant status 2013    Basal cell carcinoma 2012     left nasal tip    Blood type B+ 2013    Cancer     Celiac artery dissection     Chronic diarrhea     Chronic urethral stricture     Congenital absence of kidney      left    -donor kidney transplant 16      Induced w Campath 30 mg IV intraoperatively & SoluMedrol 875 mg total over 3 days.  Renal allograft biopsy 17 (DIVINE): 21 glomeruli, none globally sclerosed, <5% interstitial fibrosis, no ACR, c4d negative, AVR CCT  See other encounter from today Type 2 (V1 lesion); plan THYMO     Dissecting aortic aneurysm (any part), abdominal     Encounter for blood transfusion     ESRD (end stage renal disease) 06/16/2010    H/O: urethral stricture     History of AAA (abdominal aortic aneurysm) repair     Hypertension     Hypokalemia     Hypothyroidism 1/10/2014    Inguinal hernia bilateral, non-recurrent     Kidney stones     Organ transplant candidate 11/26/2013    Plantar warts 1/10/2014    S/P kidney transplant     Secondary hyperparathyroidism, renal     Thyroid disease        Medications:   Scheduled Meds:   acetaminophen  650 mg Oral Once    Thymoglobulin 1.5mg/kg, hydrocortisone 20mg, heparin 1000 units in NS 500ml (Peripheral line)  0.75 mg/kg (Order-Specific) Intravenous Once    diphenhydrAMINE  25 mg Oral Once    famotidine  20 mg Oral QHS    heparin (porcine)  5,000 Units Subcutaneous Q8H    k phos di & mono-sod phos mono  500 mg Oral TID    ketoconazole  100 mg Oral Daily    levothyroxine  112 mcg Oral Before breakfast    linezolid  600 mg Oral Q12H    magnesium oxide  400 mg Oral Daily    metoprolol tartrate  12.5 mg Oral BID    multivitamin  1 tablet Oral Daily    mycophenolate  500 mg Oral BID    nystatin  500,000 Units Oral QID (WM & HS)    potassium phosphate IVPB  15 mmol Intravenous Once    predniSONE  20 mg Oral Daily    sodium bicarbonate  1,950 mg Oral BID    sodium chloride 0.9%  3 mL Intravenous Q8H    sulfamethoxazole-trimethoprim 400-80mg  1 tablet Oral Daily    tacrolimus  8 mg Oral Daily    tacrolimus  9 mg Oral Daily    valganciclovir  450 mg Oral Daily       Continuous Infusions:     PRN Meds:.acetaminophen, diphenhydrAMINE, EPINEPHrine, hydrocortisone sodium succinate, ondansetron, oxycodone-acetaminophen    Review of Systems:   10 point review of systems was conducted and was negative except as mentioned in the HPI.    Physical Exam:  Vitals:  Vitals:    01/14/17 1300   BP: (!) 141/80   Pulse: 81    Resp: 16   Temp: 98.2 °F (36.8 °C)       Temp:  [98.2 °F (36.8 °C)-98.8 °F (37.1 °C)] 98.2 °F (36.8 °C)  Pulse:  [74-94] 81  Resp:  [16-20] 16  SpO2:  [97 %-100 %] 100 %  BP: (118-141)/(74-89) 141/80    I/Os:  Ins: 2910  Outs: 2950  Net: -40  Net for hospitalization: -485    Exam  General: No acute distress, well groomed, alert and oriented x 3  HEENT: Normocephalic, atraumatic, EOM's intact bilaterally, external inspection of ears and nose normal, moist mucous membranes, no oral ulcerations/lesions  Neck: Supple, symmetrical, trachea midline, no thyromegaly, no JVD  Respiratory: Clear to auscultation bilaterally, respirations unlabored, no rales/rhonchi/wheezing  Cardiovacular: Regular rate and rhythm, S1, S2 normal, no murmurs, rubs or gallops  Gastrointestinal: Soft, non-tender, bowel sounds normal, no hepatosplenomegaly  Renal allograft exam: No tenderness, no bruits, normal exam  Musculoskeletal: No knee or ankle joint tenderness or swelling.   Extremities: No clubbing or cyanosis of bilateral upper extremities; no lower extremity edema bilaterally, radial pulses 2+ bilaterally, symmetric  Skin: warm and dry; no rash on exposed skin  Neurologic: CN grossly intact    Labs:  Component      Latest Ref Rng & Units 1/14/2017 1/13/2017 1/12/2017   WBC      3.90 - 12.70 K/uL 1.80 (LL) 2.14 (L) 5.06   RBC      4.60 - 6.20 M/uL 3.62 (L) 3.31 (L) 3.28 (L)   Hemoglobin      14.0 - 18.0 g/dL 11.2 (L) 10.3 (L) 10.1 (L)   Hematocrit      40.0 - 54.0 % 33.2 (L) 30.0 (L) 29.6 (L)   MCV      82 - 98 fL 92 91 90   MCH      27.0 - 31.0 pg 30.9 31.1 (H) 30.8   MCHC      32.0 - 36.0 % 33.7 34.3 34.1   RDW      11.5 - 14.5 % 16.4 (H) 16.6 (H) 16.5 (H)   Platelets      150 - 350 K/uL 111 (L) 91 (L) 119 (L)   MPV      9.2 - 12.9 fL 11.8 11.2 10.7   Lymph #      1.0 - 4.8 K/uL Test Not Performed 0.0 (L) 0.0 (L)   Mono #      0.3 - 1.0 K/uL Test Not Performed 0.1 (L) 0.1 (L)   Eos #      0.0 - 0.5 K/uL Test Not Performed 0.0 0.0    Baso #      0.00 - 0.20 K/uL Test Not Performed 0.00 0.00   Gran%      38.0 - 73.0 % 97.0 (H) 90.1 (H) 96.4 (H)   Lymph%      18.0 - 48.0 % 0.0 (L) 1.9 (L) 0.8 (L)   Mono%      4.0 - 15.0 % 2.0 (L) 6.1 2.2 (L)   Eosinophil%      0.0 - 8.0 % 0.0 0.5 0.2   Basophil%      0.0 - 1.9 % 0.0 0.0 0.0   BANDS      % 1.0     Aniso       Slight     Poik       Slight     Poly       Occasional     Hypo       Occasional     Ovalocytes       Occasional     Franck Cells       Occasional     Fragmented Cells       Occasional     Differential Method       Manual Automated Automated   Gran #      1.8 - 7.7 K/uL  1.9 4.9   Sodium      136 - 145 mmol/L 141 140 136   Potassium      3.5 - 5.1 mmol/L 3.6 3.8 4.3   Chloride      95 - 110 mmol/L 113 (H) 111 (H) 106   CO2      23 - 29 mmol/L 23 21 (L) 22 (L)   Glucose      70 - 110 mg/dL 119 (H) 77 93   BUN, Bld      8 - 23 mg/dL 23 26 (H) 28 (H)   Creatinine      0.5 - 1.4 mg/dL 1.1 1.2 1.3   Calcium      8.7 - 10.5 mg/dL 8.3 (L) 7.9 (L) 7.8 (L)   Total Protein      6.0 - 8.4 g/dL 6.7 6.0    Albumin      3.5 - 5.2 g/dL 3.3 (L) 3.0 (L) 3.0 (L)   Total Bilirubin      0.1 - 1.0 mg/dL 0.3 0.3    Alkaline Phosphatase      55 - 135 U/L 93 86    AST      10 - 40 U/L 28 37    ALT      10 - 44 U/L 98 (H) 100 (H)    Anion Gap      8 - 16 mmol/L 5 (L) 8 8   eGFR if African American      >60 mL/min/1.73 m:2 >60.0 >60.0 >60.0   eGFR if non African American      >60 mL/min/1.73 m:2 >60.0 >60.0 57.3 (A)   Phosphorus      2.7 - 4.5 mg/dL 1.6 (L)  2.7   Tacrolimus Lvl      5.0 - 15.0 ng/mL 9.7 5.4 6.3   Magnesium      1.6 - 2.6 mg/dL 2.0 1.7 1.9       Imaging Studies:    ?    Assessment/Plan:  66 y.o. male with:    # History of ESRD secondary to decreased renal mass (congenitally absent left kidney) and HTN who was on dialysis since 6/16/10 until receiving PHS increased risk DDRT (pumped kidney, Campath induction, KDPI 36%, WIT 30 minutes, CIT 14 hours and 14 minutes, donor RPR positive thus patient  completed PCN x3, CMV D+/R+) on 11/26/16: corina Cr since transplant being 1.3 on 12/27/16 but noted to have DIVINE on outpatient labs up to 2.6 on 1/4/17 and was admitted for further work-up however he had improvement in Cr without intervention. He did have biopsy performed on 1/6/17 which resulted with 21 glomeruli, none globally sclerosed, <5% interstitial fibrosis, no ACR, c4d negative, AVR CCT Type 2 (V1 lesion)  - Cr improved at 1.1  - will administer THYMO dose #4 today but only half dose and will administer Neupogen 480 mcg today     # Immunosuppression Management:  - decrease Prograf slightly from 9/8 to 8 mg BID; goal [id number] trough level is 8-10; level today 9.7 but this was only ~10 hour level but he was started on ketoconazole 100 mg daily for Prograf level augmentation on 1/13/17  - continue Cellcept 500 mg BID; once THYMO completed would aim to uptitrate to 1000 mg BID   - continue prednisone 20 mg daily  - continue to monitor for toxicities from immunosuppressive medications    # Immune Prophylaxis:  - continue Bactrim for PJP prophylaxis for 1 year  - continue Valcyte for CMV prophylaxis for 3 months; CMV D+/R+  - continue nystatin for thrush prophylaxis for 1 month    # Enterococcus UTI: patient noted to have fever of 100.5 on 1/11/17 thus obtained urine culture which is growing >100,000 CFU of enterococcus species from urine culture on 1/13/17  - codisntinue vancomycin IV   - start linezolid 600 mg BID to complete 7 days of therapy; end of therapy date 1/20/17    # Anemia of chronic disease: Hb currently in the 10-11 range  - check iron panel and ferritin   - continue to monitor    # Leukopenia: likely related to THYMO; CMV PCR negative from 1/10/17   - administer Neupogen 480 mcg SC today   - continue to monitor     # Thrombocytopenia: likely related to THYMO   - continue to monitor     # HTN: BPs stable overall   - continue metoprolol 12.5 mg BID   - continue to monitor    # Hypothyroidism:   -  continue synthroid     # Metabolic acidosis: bicarb improved at 23  - continue sodium bicarbonate 1950 mg BID     # Lytes:  - continue to monitor corrected calcium, potassium, phos, and magnesium --> currently within normal  - hypophosphatemia --> continue PO K-phos 500 mg TID; will administer IV K-phos 15 mmol today; encourage patient regarding higher phos diet     Deb Gardner MD  Renal Transplant Attending

## 2022-02-25 NOTE — ANESTHESIA POSTPROCEDURE EVALUATION
Anesthesia Post Evaluation    Patient: Alin Burkett    Procedure(s) Performed: Procedure(s) (LRB):  ESOPHAGOGASTRODUODENOSCOPY (EGD) (N/A)  SIGMOIDOSCOPY, FLEXIBLE (N/A)    Final Anesthesia Type: general    Patient location during evaluation: PACU  Patient participation: Yes- Able to Participate  Level of consciousness: awake and alert  Post-procedure vital signs: reviewed and stable  Pain management: adequate  Airway patency: patent    PONV status at discharge: No PONV  Anesthetic complications: no      Cardiovascular status: blood pressure returned to baseline, hemodynamically unstable and tachycardic  Respiratory status: unassisted  Hydration status: euvolemic  Follow-up not needed.          Vitals Value Taken Time   /76 10/31/20 1806   Temp 37.2 °C (99 °F) 10/31/20 1558   Pulse 127 10/31/20 1806   Resp 32 10/31/20 1806   SpO2 98 % 10/31/20 1806         No case tracking events are documented in the log.      Pain/Raheem Score: Pain Rating Prior to Med Admin: 1 (10/31/2020  4:21 AM)  Pain Rating Post Med Admin: 0 (10/31/2020  5:21 AM)         Jayant Casey spoke to Jacob James verified they did receive return to work letter from Dr Lyubov Michelle  Patient may return to work on 3/7/2022

## 2023-01-12 NOTE — PLAN OF CARE
CONTINUE CURRENT THERAPY     W Plasty Text: The lesion was extirpated to the level of the fat with a #15 scalpel blade.  Given the location of the defect, shape of the defect and the proximity to free margins a W-plasty was deemed most appropriate for repair.  Using a sterile surgical marker, the appropriate transposition arms of the W-plasty were drawn incorporating the defect and placing the expected incisions within the relaxed skin tension lines where possible.    The area thus outlined was incised deep to adipose tissue with a #15 scalpel blade.  The skin margins were undermined to an appropriate distance in all directions utilizing iris scissors.  The opposing transposition arms were then transposed into place in opposite direction and anchored with interrupted buried subcutaneous sutures.

## 2023-08-28 NOTE — IP AVS SNAPSHOT
[address]  [place] [address]  Phone: [phone]           I have received a copy of my After Visit Summary and discharge instructions from [facility].    INSTRUCTIONS RECEIVED AND UNDERSTOOD BY:                     Patient/Patient Representative: ________________________________________________________________     Date/Time: ________________________________________________________________                     Instructions Given By: ________________________________________________________________     Date/Time: ________________________________________________________________            Please let patient know his PSA was normal at 0.15.   Can repeat this in 1 year due to family history of prostate cancer in his father

## 2024-01-01 NOTE — CONSULTS
Pharmacokinetic Initial Assessment: IV Vancomycin    Assessment/Plan:    Initiate intravenous vancomycin with dose of 1500 mg once with subsequent doses when random concentrations are less than 20 mcg/mL  Desired empiric serum trough concentration is 15 to 20 mcg/mL  Draw vancomycin random level on 10/30/2020 at 2000.  Pharmacy will continue to follow and monitor vancomycin.      Please contact pharmacy at extension [phone] with any questions regarding this assessment.     Thank you for the consult,   Naun Martínez       Patient brief summary:  Alin Burkett is a 70 y.o. male initiated on antimicrobial therapy with IV Vancomycin for treatment of suspected bacteremia    Drug Allergies:   Review of patient's allergies indicates:  No Known Allergies    Actual Body Weight:   80.1kg    Renal Function:   Estimated Creatinine Clearance: 41.3 mL/min (A) (based on SCr of 1.8 mg/dL (H)).,     CBC (last 72 hours):  Recent Labs   Lab Result Units 10/28/20  0433 10/29/20  0444 10/30/20  0448 10/30/20  1733   WBC K/uL 5.16 5.73 5.29 3.99   Hemoglobin g/dL 9.9* 9.3* 9.2* 8.2*   Hematocrit % 30.2* 28.5* 27.7* 25.7*   Platelets K/uL 195 196 202 181   Gran % % 70.2 67.7 61.0 61.0   Lymph % % 16.9* 19.0 27.0 26.0   Mono % % 10.3 11.0 7.0 5.0   Eosinophil % % 0.2 0.2 1.0 0.0   Basophil % % 0.8 0.5 0.0 0.0   Differential Method  Automated Automated Manual Manual       Metabolic Panel (last 72 hours):  Recent Labs   Lab Result Units 10/28/20  0433 10/28/20  1831 10/29/20  0444 10/30/20  0448   Sodium mmol/L 137  --  136 137   Potassium mmol/L 3.2*  --  3.2* 3.3*   Chloride mmol/L 115*  --  115* 115*   CO2 mmol/L 12*  --  11* 12*   Glucose mg/dL 131*  --  110 100   Glucose, UA   --  Negative  --   --    BUN mg/dL 36*  --  39* 38*   Creatinine mg/dL 2.2*  --  2.0* 1.8*       Drug levels (last 3 results):  No results for input(s): VANCOMYCINRA, VANCOMYCINPE, VANCOMYCINTR in the last 72 hours.    Microbiologic Results:  Microbiology Results  (last 7 days)       Procedure Component Value Units Date/Time    Blood culture [[id number]]     Order Status: Sent Specimen: Blood     Blood culture [[id number]] Collected: 10/28/20 1728    Order Status: Completed Specimen: Blood Updated: 10/30/20 1925     Blood Culture, Routine Gram stain aer bottle: Gram positive cocci in clusters resembling Staph       Results called to and read back by: Fifi Li, Charge Nurse       10/30/2020  19:25    Narrative:      DRAW FROM [facility]    Stool culture [[id number]]     Order Status: No result Specimen: Stool     Urine Culture High Risk [[id number]]  (Abnormal) Collected: 10/29/20 1744    Order Status: Completed Specimen: Urine, Clean Catch Updated: 10/30/20 1700     Urine Culture, Routine GRAM NEGATIVE DESI  >100,000 cfu/ml  Identification and susceptibility pending      Narrative:      Indicated criteria for high risk culture:->Other  Other (specify):->transplant pt    Urine culture [[id number]]  (Abnormal) Collected: 10/28/20 1831    Order Status: Completed Specimen: Urine Updated: 10/30/20 1231     Urine Culture, Routine PSEUDOMONAS AERUGINOSA  > 100,000 cfu/ml  Susceptibility pending      Narrative:      Specimen Source->Urine             umbilicus, no erythema

## 2024-04-23 NOTE — TELEPHONE ENCOUNTER
"----- Message from Cuca Saenz sent at 8/28/2020 10:06 AM CDT -----  Regarding: Please call  Patient Assist    Name of caller:  spouse   Provider name:  John Gould MD  Contact Preference:  [phone]   Current patient or new patient?: current   Does Patient feel the need to see the MD today? No   What is the nature of the call?    - pt is at testing location for lab today and he's not feeling well,   his wife would like for him to be called because he feels that he   may have to see the MD    Additional Notes:   "Thank you for all that you do for our patients'"          " Patient tolerated procedure well.

## 2025-06-12 NOTE — TELEPHONE ENCOUNTER
----- Message from Travis Zimmerman MD sent at 2/14/2017  8:45 AM CST -----  Please start Cipro 500 bid for 7 days  
----- Message from Travis Zimmerman MD sent at 2/14/2017  8:47 AM CST -----  Please check adherence to KPN his Phos is a bit lower  Encourage dairy  Products  Will repeat labs next weeks    
Notified patient of Dr. Zimmerman review of 2/13/17 lab results. Instructed patient to start Cipro 500 twice daily for 7 days.  Encourage patient to increase intake of foods high in phosphorus; next lab appointments are 2/20 & 2/27/17.   Patient verbalized understanding.     
Grossly Intact

## (undated) DEVICE — DRAPE ABDOMINAL TIBURON 14X11

## (undated) DEVICE — GOWN SURGICAL X-LARGE

## (undated) DEVICE — EVACUATOR WOUND BULB 100CC

## (undated) DEVICE — GUIDEWIRE PTFE .038INX145CM

## (undated) DEVICE — CATH FOLLOWER HEYMAN 14FR

## (undated) DEVICE — PACK CUSTOM UNIV BASIN SLI

## (undated) DEVICE — SEE MEDLINE ITEM 156902

## (undated) DEVICE — UNDERGLOVES BIOGEL PI SIZE 8

## (undated) DEVICE — KNIFE DISPOSABLE COLD STG

## (undated) DEVICE — PAD K-THERMIA 24IN X 60IN

## (undated) DEVICE — CATH FOLLOWER HEYMAN 10FR

## (undated) DEVICE — SYR 30CC LUER LOCK

## (undated) DEVICE — SUT SILK 3-0 SH DETACH 30IN

## (undated) DEVICE — GOWN SMARTGOWN LVL4 X-LONG XL

## (undated) DEVICE — Device

## (undated) DEVICE — TRAY CYSTO BASIN

## (undated) DEVICE — DRAPE C ARM 42 X 120 10/BX

## (undated) DEVICE — NDL 22GA X1 1/2 REG BEVEL

## (undated) DEVICE — BAG URINARY DRAINAGE 2000ML

## (undated) DEVICE — SUT 2-0 12-18IN SILK

## (undated) DEVICE — GUIDE WIRE MOTION .035 X 150CM

## (undated) DEVICE — PACK CYSTO

## (undated) DEVICE — DRESSING ABSRBNT ISLAND 3.6X8

## (undated) DEVICE — SEE MEDLINE ITEM 152622

## (undated) DEVICE — SYR 10CC LUER LOCK

## (undated) DEVICE — HEMOSTAT SURGICEL PWD 3G

## (undated) DEVICE — SEE MEDLINE ITEM 157117

## (undated) DEVICE — SET CYSTO IRRIGATION UNIV SPIK

## (undated) DEVICE — COLLECTOR SPECIMEN ANAEROBIC

## (undated) DEVICE — SUT ETHILON 2-0 FS 18IN BLK

## (undated) DEVICE — SYR 50ML CATH TIP

## (undated) DEVICE — STAPLER SKIN ROTATING HEAD

## (undated) DEVICE — SUT 2-0 VICRYL / CT-1

## (undated) DEVICE — CATH FOLLOWER HEYMAN 22FR

## (undated) DEVICE — SEALER LIGASURE IMPACT 18CM

## (undated) DEVICE — STAPLER SKIN PROXIMATE WIDE

## (undated) DEVICE — SLEEVE SCD EXPRESS CALF MEDIUM

## (undated) DEVICE — SOL IRR WATER STRL 3000 ML

## (undated) DEVICE — CUTTER PROXIMATE BLUE 75MM

## (undated) DEVICE — PACK EP DRAPE

## (undated) DEVICE — GUIDEWIRE WHOLEY 260CMX0.035IN

## (undated) DEVICE — SUT 3-0 VICRYL SH CR/8 18

## (undated) DEVICE — SUT VICRYL 4-0 18 P-3

## (undated) DEVICE — SUT ETHILON 2 BLK MONO TP-1

## (undated) DEVICE — CATH FOLLOWER HEYMAN 16FR

## (undated) DEVICE — SEE L#95700

## (undated) DEVICE — PACK BASIC

## (undated) DEVICE — CATH RESPONSE QPLR JSN 6F 120

## (undated) DEVICE — SPONGE GAUZE 16PLY 4X4

## (undated) DEVICE — ELECTRODE POLYHESIVEPRE-ATTACH

## (undated) DEVICE — DRAPE FLUID WARMER ORS 44X44IN

## (undated) DEVICE — DRAPE THYROID WITH ARMBOARD

## (undated) DEVICE — GAUZE SPONGE 4X4 12PLY

## (undated) DEVICE — SHEATH PINNACLE 6FRX10CM W/GUIDEWIRE

## (undated) DEVICE — INTRODUCER AVANTI 8.5F .038X11

## (undated) DEVICE — BLADE SURG CARBON STEEL SZ11

## (undated) DEVICE — SUT 5-0 VICRYL 12/BX

## (undated) DEVICE — SEE MEDLINE ITEM 157128

## (undated) DEVICE — ADHESIVE DERMABOND ADVANCED

## (undated) DEVICE — SWAB CULTURETTE SINGLE

## (undated) DEVICE — SUT MONOCRYL 4-0 PS-2

## (undated) DEVICE — GEL LUBE ENDOGLIDE 1.1OZ

## (undated) DEVICE — CATHETER RADIAL TIG 4.0 OPTITORQUE 5X110

## (undated) DEVICE — CONTAINER SPECIMEN STRL 4OZ

## (undated) DEVICE — CATH STATLOCK MULTI-PURPOS

## (undated) DEVICE — PACK LAPSCP/PELVSCPY III TIBRN

## (undated) DEVICE — SPONGE DERMACEA GAUZE 4X4

## (undated) DEVICE — LINER SUCTION 3000CC

## (undated) DEVICE — CLOSURE SKIN 1X5 STERI-STRIP

## (undated) DEVICE — SPONGE LAP 18X18 PREWASHED

## (undated) DEVICE — SUT 4-0 VICRYL / SH

## (undated) DEVICE — LUBRICANT SURGILUBE 2 OZ

## (undated) DEVICE — SEE MEDLINE ITEM 146313

## (undated) DEVICE — SET CYSTO IRRIGATING

## (undated) DEVICE — PAD DEFIB CADENCE ADULT R2

## (undated) DEVICE — STRAP OR TABLE 5IN X 72IN

## (undated) DEVICE — SUT SILK 0 STRANDS 30IN BLK

## (undated) DEVICE — ELECTRODE REM PLYHSV RETURN 9

## (undated) DEVICE — POWDER ARISTA AH 3G

## (undated) DEVICE — SUT SILK BLK. BR. 3-0 10

## (undated) DEVICE — SOL 9P NACL IRR PIC IL

## (undated) DEVICE — STAPLER INTERNL CONTOR CV BLU

## (undated) DEVICE — TRAY FOLEY 16FR INFECTION CONT

## (undated) DEVICE — WIRE GUIDE 0.038OLD

## (undated) DEVICE — SPONGE IV DRAIN 4X4 STERILE

## (undated) DEVICE — SET IRR URLGY 2LINE UNIV SPIKE

## (undated) DEVICE — ELECTRODE BLADE INSULATED 1 IN

## (undated) DEVICE — TRAY MINOR GEN SURG

## (undated) DEVICE — SUT COATED VICRYL 4/0 27IN

## (undated) DEVICE — SUT SA85H SILK 2-0

## (undated) DEVICE — NDL SAFETY 21G X 1 1/2 ECLPSE

## (undated) DEVICE — ADHESIVE MASTISOL VIAL 48/BX

## (undated) DEVICE — SUT CTD VICRYL 4-0 UND BR

## (undated) DEVICE — SUT 3-0 VICRYL / LIGAPACK

## (undated) DEVICE — SEE MEDLINE ITEM 157131

## (undated) DEVICE — SEE MEDLINE ITEM 157216

## (undated) DEVICE — RETRACTOR STAY SPIRA  20MM

## (undated) DEVICE — SEE MEDLINE ITEM 157181

## (undated) DEVICE — SYR ONLY LUER LOCK 20CC

## (undated) DEVICE — CLOSURE SKIN STERI STRIP 1/2X4

## (undated) DEVICE — APPLICATOR CHLORAPREP ORN 26ML

## (undated) DEVICE — SEE MEDLINE ITEM 154981

## (undated) DEVICE — PACK PERI/GYN OPTIMA

## (undated) DEVICE — INTRODUCER AVANTI 7.5F .038X11

## (undated) DEVICE — NDL SURGEON MAYO #7 2/PK 72PKS

## (undated) DEVICE — SYR ONLY 60CC TOOMEY

## (undated) DEVICE — PATCH CARTO REFERENCE

## (undated) DEVICE — HOOK STAY ELAS 5MM 8EA/PK

## (undated) DEVICE — CATHETER EXPO MLTPK 6FR 100X110CM

## (undated) DEVICE — SUT CTD VICRYL BR CR/SH VIL

## (undated) DEVICE — CATH NAV THERMOCOUPLE 7F 4MM

## (undated) DEVICE — DRAIN SIL RND HUBLSS 19F TRCR

## (undated) DEVICE — SOL IRR NACL .9% 3000ML

## (undated) DEVICE — KIT PROBE COVER WITH GEL

## (undated) DEVICE — SYR CNTRL MALE LL 10ML

## (undated) DEVICE — APPLIER LIGACLIP SM 9.38IN

## (undated) DEVICE — GLOVE SURG ULTRA TOUCH 7.5

## (undated) DEVICE — HOLDER CATH IAB ADH STATLOCK

## (undated) DEVICE — SET CYSTO IRR DRP CHMBR 84IN

## (undated) DEVICE — APPLIER LIGACLIP MED 11IN

## (undated) DEVICE — INSTRUMENT POOLE ST

## (undated) DEVICE — SOL NACL 0.9% INJ PF/50151

## (undated) DEVICE — INTRO 8.5FR 63CM SRO

## (undated) DEVICE — RELOAD PROXIMATE CUT BLUE 75MM

## (undated) DEVICE — CATH BIDIRECTIONAL DF CRV 7FR

## (undated) DEVICE — ELECTRODE BLD EXT 6.50 ST DISP

## (undated) DEVICE — SUT 1 48IN PDS II VIO MONO

## (undated) DEVICE — SHEATH INTRODUCER SLENDER 6FX10CM